# Patient Record
Sex: FEMALE
[De-identification: names, ages, dates, MRNs, and addresses within clinical notes are randomized per-mention and may not be internally consistent; named-entity substitution may affect disease eponyms.]

---

## 2016-09-21 RX ORDER — NEOMYCIN/POLYMYXIN B/DEXAMETHA 0.1 %
1 SUSPENSION, DROPS(FINAL DOSAGE FORM)(ML) OPHTHALMIC (EYE)
Qty: 0 | Refills: 0 | COMMUNITY
Start: 2016-09-21

## 2017-01-06 ENCOUNTER — APPOINTMENT (OUTPATIENT)
Dept: PHYSICAL MEDICINE AND REHAB | Facility: CLINIC | Age: 67
End: 2017-01-06

## 2017-01-17 ENCOUNTER — OUTPATIENT (OUTPATIENT)
Dept: OUTPATIENT SERVICES | Facility: HOSPITAL | Age: 67
LOS: 1 days | End: 2017-01-17
Payer: MEDICARE

## 2017-01-17 DIAGNOSIS — Z98.89 OTHER SPECIFIED POSTPROCEDURAL STATES: Chronic | ICD-10-CM

## 2017-01-17 DIAGNOSIS — L89.300 PRESSURE ULCER OF UNSPECIFIED BUTTOCK, UNSTAGEABLE: ICD-10-CM

## 2017-01-17 PROCEDURE — G0463: CPT

## 2017-01-19 ENCOUNTER — OUTPATIENT (OUTPATIENT)
Dept: OUTPATIENT SERVICES | Facility: HOSPITAL | Age: 67
LOS: 1 days | End: 2017-01-19
Payer: MEDICARE

## 2017-01-19 DIAGNOSIS — Z98.89 OTHER SPECIFIED POSTPROCEDURAL STATES: Chronic | ICD-10-CM

## 2017-01-19 DIAGNOSIS — Z87.891 PERSONAL HISTORY OF NICOTINE DEPENDENCE: ICD-10-CM

## 2017-01-19 DIAGNOSIS — L89.153 PRESSURE ULCER OF SACRAL REGION, STAGE 3: ICD-10-CM

## 2017-01-19 DIAGNOSIS — H54.42 BLINDNESS, LEFT EYE, NORMAL VISION RIGHT EYE: ICD-10-CM

## 2017-01-19 DIAGNOSIS — R32 UNSPECIFIED URINARY INCONTINENCE: ICD-10-CM

## 2017-01-19 DIAGNOSIS — L89.300 PRESSURE ULCER OF UNSPECIFIED BUTTOCK, UNSTAGEABLE: ICD-10-CM

## 2017-01-19 DIAGNOSIS — Z79.899 OTHER LONG TERM (CURRENT) DRUG THERAPY: ICD-10-CM

## 2017-01-19 DIAGNOSIS — Z98.890 OTHER SPECIFIED POSTPROCEDURAL STATES: ICD-10-CM

## 2017-01-19 PROCEDURE — G0463: CPT

## 2017-01-20 ENCOUNTER — APPOINTMENT (OUTPATIENT)
Dept: PHYSICAL MEDICINE AND REHAB | Facility: CLINIC | Age: 67
End: 2017-01-20

## 2017-01-20 VITALS
TEMPERATURE: 97.6 F | DIASTOLIC BLOOD PRESSURE: 55 MMHG | SYSTOLIC BLOOD PRESSURE: 113 MMHG | OXYGEN SATURATION: 96 % | HEART RATE: 73 BPM

## 2017-01-20 DIAGNOSIS — Q05.9 SPINA BIFIDA, UNSPECIFIED: ICD-10-CM

## 2017-01-21 DIAGNOSIS — L89.153 PRESSURE ULCER OF SACRAL REGION, STAGE 3: ICD-10-CM

## 2017-01-23 ENCOUNTER — RX RENEWAL (OUTPATIENT)
Age: 67
End: 2017-01-23

## 2017-01-23 RX ORDER — ERTAPENEM SODIUM 1 G/1
1 INJECTION, POWDER, LYOPHILIZED, FOR SOLUTION INTRAMUSCULAR; INTRAVENOUS
Qty: 606 | Refills: 0 | Status: COMPLETED | COMMUNITY
Start: 2016-08-09

## 2017-01-24 ENCOUNTER — OUTPATIENT (OUTPATIENT)
Dept: OUTPATIENT SERVICES | Facility: HOSPITAL | Age: 67
LOS: 1 days | End: 2017-01-24
Payer: MEDICARE

## 2017-01-24 DIAGNOSIS — L89.300 PRESSURE ULCER OF UNSPECIFIED BUTTOCK, UNSTAGEABLE: ICD-10-CM

## 2017-01-24 DIAGNOSIS — Z98.89 OTHER SPECIFIED POSTPROCEDURAL STATES: Chronic | ICD-10-CM

## 2017-01-24 PROCEDURE — 97602 WOUND(S) CARE NON-SELECTIVE: CPT

## 2017-01-26 ENCOUNTER — FORM ENCOUNTER (OUTPATIENT)
Age: 67
End: 2017-01-26

## 2017-01-26 DIAGNOSIS — R32 UNSPECIFIED URINARY INCONTINENCE: ICD-10-CM

## 2017-01-26 DIAGNOSIS — Z79.899 OTHER LONG TERM (CURRENT) DRUG THERAPY: ICD-10-CM

## 2017-01-26 DIAGNOSIS — H54.42 BLINDNESS, LEFT EYE, NORMAL VISION RIGHT EYE: ICD-10-CM

## 2017-01-26 DIAGNOSIS — L89.153 PRESSURE ULCER OF SACRAL REGION, STAGE 3: ICD-10-CM

## 2017-01-26 DIAGNOSIS — Z87.891 PERSONAL HISTORY OF NICOTINE DEPENDENCE: ICD-10-CM

## 2017-01-26 DIAGNOSIS — G83.9 PARALYTIC SYNDROME, UNSPECIFIED: ICD-10-CM

## 2017-01-26 DIAGNOSIS — Z98.890 OTHER SPECIFIED POSTPROCEDURAL STATES: ICD-10-CM

## 2017-01-27 ENCOUNTER — APPOINTMENT (OUTPATIENT)
Dept: MRI IMAGING | Facility: CLINIC | Age: 67
End: 2017-01-27

## 2017-01-27 ENCOUNTER — OUTPATIENT (OUTPATIENT)
Dept: OUTPATIENT SERVICES | Facility: HOSPITAL | Age: 67
LOS: 1 days | End: 2017-01-27
Payer: MEDICARE

## 2017-01-27 DIAGNOSIS — G82.22 PARAPLEGIA, INCOMPLETE: ICD-10-CM

## 2017-01-27 DIAGNOSIS — Z98.89 OTHER SPECIFIED POSTPROCEDURAL STATES: Chronic | ICD-10-CM

## 2017-01-27 DIAGNOSIS — G03.9 MENINGITIS, UNSPECIFIED: ICD-10-CM

## 2017-01-28 PROCEDURE — A9585: CPT

## 2017-01-28 PROCEDURE — 72158 MRI LUMBAR SPINE W/O & W/DYE: CPT

## 2017-01-28 PROCEDURE — 82565 ASSAY OF CREATININE: CPT

## 2017-01-28 PROCEDURE — 72157 MRI CHEST SPINE W/O & W/DYE: CPT

## 2017-01-30 ENCOUNTER — OUTPATIENT (OUTPATIENT)
Dept: OUTPATIENT SERVICES | Facility: HOSPITAL | Age: 67
LOS: 1 days | End: 2017-01-30
Payer: MEDICARE

## 2017-01-30 DIAGNOSIS — Z98.89 OTHER SPECIFIED POSTPROCEDURAL STATES: Chronic | ICD-10-CM

## 2017-01-30 DIAGNOSIS — L89.300 PRESSURE ULCER OF UNSPECIFIED BUTTOCK, UNSTAGEABLE: ICD-10-CM

## 2017-01-30 PROCEDURE — 97602 WOUND(S) CARE NON-SELECTIVE: CPT

## 2017-01-31 ENCOUNTER — RX RENEWAL (OUTPATIENT)
Age: 67
End: 2017-01-31

## 2017-01-31 DIAGNOSIS — Z79.899 OTHER LONG TERM (CURRENT) DRUG THERAPY: ICD-10-CM

## 2017-01-31 DIAGNOSIS — Z87.891 PERSONAL HISTORY OF NICOTINE DEPENDENCE: ICD-10-CM

## 2017-01-31 DIAGNOSIS — H54.42 BLINDNESS, LEFT EYE, NORMAL VISION RIGHT EYE: ICD-10-CM

## 2017-01-31 DIAGNOSIS — L89.153 PRESSURE ULCER OF SACRAL REGION, STAGE 3: ICD-10-CM

## 2017-01-31 DIAGNOSIS — G83.9 PARALYTIC SYNDROME, UNSPECIFIED: ICD-10-CM

## 2017-01-31 DIAGNOSIS — Z98.890 OTHER SPECIFIED POSTPROCEDURAL STATES: ICD-10-CM

## 2017-01-31 DIAGNOSIS — R32 UNSPECIFIED URINARY INCONTINENCE: ICD-10-CM

## 2017-02-06 ENCOUNTER — OUTPATIENT (OUTPATIENT)
Dept: OUTPATIENT SERVICES | Facility: HOSPITAL | Age: 67
LOS: 1 days | End: 2017-02-06
Payer: MEDICARE

## 2017-02-06 DIAGNOSIS — L89.300 PRESSURE ULCER OF UNSPECIFIED BUTTOCK, UNSTAGEABLE: ICD-10-CM

## 2017-02-06 DIAGNOSIS — Z98.89 OTHER SPECIFIED POSTPROCEDURAL STATES: Chronic | ICD-10-CM

## 2017-02-06 PROCEDURE — G0463: CPT

## 2017-02-07 DIAGNOSIS — H54.42 BLINDNESS, LEFT EYE, NORMAL VISION RIGHT EYE: ICD-10-CM

## 2017-02-07 DIAGNOSIS — R32 UNSPECIFIED URINARY INCONTINENCE: ICD-10-CM

## 2017-02-07 DIAGNOSIS — L89.153 PRESSURE ULCER OF SACRAL REGION, STAGE 3: ICD-10-CM

## 2017-02-07 DIAGNOSIS — G83.9 PARALYTIC SYNDROME, UNSPECIFIED: ICD-10-CM

## 2017-02-07 DIAGNOSIS — Z79.899 OTHER LONG TERM (CURRENT) DRUG THERAPY: ICD-10-CM

## 2017-02-07 DIAGNOSIS — Z87.891 PERSONAL HISTORY OF NICOTINE DEPENDENCE: ICD-10-CM

## 2017-02-07 DIAGNOSIS — Z98.890 OTHER SPECIFIED POSTPROCEDURAL STATES: ICD-10-CM

## 2017-02-13 ENCOUNTER — OUTPATIENT (OUTPATIENT)
Dept: OUTPATIENT SERVICES | Facility: HOSPITAL | Age: 67
LOS: 1 days | End: 2017-02-13
Payer: MEDICARE

## 2017-02-13 DIAGNOSIS — Z98.89 OTHER SPECIFIED POSTPROCEDURAL STATES: Chronic | ICD-10-CM

## 2017-02-13 DIAGNOSIS — L89.300 PRESSURE ULCER OF UNSPECIFIED BUTTOCK, UNSTAGEABLE: ICD-10-CM

## 2017-02-13 PROCEDURE — G0463: CPT

## 2017-02-14 DIAGNOSIS — Z87.891 PERSONAL HISTORY OF NICOTINE DEPENDENCE: ICD-10-CM

## 2017-02-14 DIAGNOSIS — R32 UNSPECIFIED URINARY INCONTINENCE: ICD-10-CM

## 2017-02-14 DIAGNOSIS — L89.152 PRESSURE ULCER OF SACRAL REGION, STAGE 2: ICD-10-CM

## 2017-02-14 DIAGNOSIS — Z98.890 OTHER SPECIFIED POSTPROCEDURAL STATES: ICD-10-CM

## 2017-02-14 DIAGNOSIS — H54.42 BLINDNESS, LEFT EYE, NORMAL VISION RIGHT EYE: ICD-10-CM

## 2017-02-14 DIAGNOSIS — Z79.899 OTHER LONG TERM (CURRENT) DRUG THERAPY: ICD-10-CM

## 2017-02-14 DIAGNOSIS — G83.9 PARALYTIC SYNDROME, UNSPECIFIED: ICD-10-CM

## 2017-02-15 ENCOUNTER — RX RENEWAL (OUTPATIENT)
Age: 67
End: 2017-02-15

## 2017-02-15 RX ORDER — GABAPENTIN 100 MG/1
100 CAPSULE ORAL 4 TIMES DAILY
Qty: 1080 | Refills: 1 | Status: COMPLETED | COMMUNITY
Start: 2017-02-13 | End: 2017-02-15

## 2017-02-27 ENCOUNTER — OUTPATIENT (OUTPATIENT)
Dept: OUTPATIENT SERVICES | Facility: HOSPITAL | Age: 67
LOS: 1 days | End: 2017-02-27
Payer: MEDICARE

## 2017-02-27 DIAGNOSIS — Z98.89 OTHER SPECIFIED POSTPROCEDURAL STATES: Chronic | ICD-10-CM

## 2017-02-27 DIAGNOSIS — L89.300 PRESSURE ULCER OF UNSPECIFIED BUTTOCK, UNSTAGEABLE: ICD-10-CM

## 2017-02-27 PROCEDURE — G0463: CPT

## 2017-02-28 DIAGNOSIS — H54.42 BLINDNESS, LEFT EYE, NORMAL VISION RIGHT EYE: ICD-10-CM

## 2017-02-28 DIAGNOSIS — Z98.890 OTHER SPECIFIED POSTPROCEDURAL STATES: ICD-10-CM

## 2017-02-28 DIAGNOSIS — G83.9 PARALYTIC SYNDROME, UNSPECIFIED: ICD-10-CM

## 2017-02-28 DIAGNOSIS — Z87.891 PERSONAL HISTORY OF NICOTINE DEPENDENCE: ICD-10-CM

## 2017-02-28 DIAGNOSIS — R32 UNSPECIFIED URINARY INCONTINENCE: ICD-10-CM

## 2017-02-28 DIAGNOSIS — L89.152 PRESSURE ULCER OF SACRAL REGION, STAGE 2: ICD-10-CM

## 2017-02-28 DIAGNOSIS — Z79.899 OTHER LONG TERM (CURRENT) DRUG THERAPY: ICD-10-CM

## 2017-03-06 ENCOUNTER — RX RENEWAL (OUTPATIENT)
Age: 67
End: 2017-03-06

## 2017-03-13 ENCOUNTER — OUTPATIENT (OUTPATIENT)
Dept: OUTPATIENT SERVICES | Facility: HOSPITAL | Age: 67
LOS: 1 days | End: 2017-03-13
Payer: MEDICARE

## 2017-03-13 DIAGNOSIS — L89.300 PRESSURE ULCER OF UNSPECIFIED BUTTOCK, UNSTAGEABLE: ICD-10-CM

## 2017-03-13 DIAGNOSIS — Z98.89 OTHER SPECIFIED POSTPROCEDURAL STATES: Chronic | ICD-10-CM

## 2017-03-13 PROCEDURE — G0463: CPT

## 2017-03-16 DIAGNOSIS — R32 UNSPECIFIED URINARY INCONTINENCE: ICD-10-CM

## 2017-03-16 DIAGNOSIS — Z79.899 OTHER LONG TERM (CURRENT) DRUG THERAPY: ICD-10-CM

## 2017-03-16 DIAGNOSIS — Z87.891 PERSONAL HISTORY OF NICOTINE DEPENDENCE: ICD-10-CM

## 2017-03-16 DIAGNOSIS — L89.152 PRESSURE ULCER OF SACRAL REGION, STAGE 2: ICD-10-CM

## 2017-03-16 DIAGNOSIS — Z98.890 OTHER SPECIFIED POSTPROCEDURAL STATES: ICD-10-CM

## 2017-03-16 DIAGNOSIS — H54.42 BLINDNESS, LEFT EYE, NORMAL VISION RIGHT EYE: ICD-10-CM

## 2017-03-16 DIAGNOSIS — G83.9 PARALYTIC SYNDROME, UNSPECIFIED: ICD-10-CM

## 2017-03-27 ENCOUNTER — OUTPATIENT (OUTPATIENT)
Dept: OUTPATIENT SERVICES | Facility: HOSPITAL | Age: 67
LOS: 1 days | End: 2017-03-27
Payer: MEDICARE

## 2017-03-27 DIAGNOSIS — L89.300 PRESSURE ULCER OF UNSPECIFIED BUTTOCK, UNSTAGEABLE: ICD-10-CM

## 2017-03-27 DIAGNOSIS — Z98.89 OTHER SPECIFIED POSTPROCEDURAL STATES: Chronic | ICD-10-CM

## 2017-03-27 PROCEDURE — G0463: CPT

## 2017-03-28 DIAGNOSIS — Z98.890 OTHER SPECIFIED POSTPROCEDURAL STATES: ICD-10-CM

## 2017-03-28 DIAGNOSIS — L89.152 PRESSURE ULCER OF SACRAL REGION, STAGE 2: ICD-10-CM

## 2017-03-28 DIAGNOSIS — Z87.891 PERSONAL HISTORY OF NICOTINE DEPENDENCE: ICD-10-CM

## 2017-03-28 DIAGNOSIS — H54.42 BLINDNESS, LEFT EYE, NORMAL VISION RIGHT EYE: ICD-10-CM

## 2017-03-28 DIAGNOSIS — R32 UNSPECIFIED URINARY INCONTINENCE: ICD-10-CM

## 2017-03-28 DIAGNOSIS — Z79.899 OTHER LONG TERM (CURRENT) DRUG THERAPY: ICD-10-CM

## 2017-03-28 DIAGNOSIS — G83.9 PARALYTIC SYNDROME, UNSPECIFIED: ICD-10-CM

## 2017-03-29 ENCOUNTER — APPOINTMENT (OUTPATIENT)
Dept: OPHTHALMOLOGY | Facility: CLINIC | Age: 67
End: 2017-03-29

## 2017-05-19 ENCOUNTER — OUTPATIENT (OUTPATIENT)
Dept: OUTPATIENT SERVICES | Facility: HOSPITAL | Age: 67
LOS: 1 days | End: 2017-05-19
Payer: MEDICARE

## 2017-05-19 DIAGNOSIS — Z98.89 OTHER SPECIFIED POSTPROCEDURAL STATES: Chronic | ICD-10-CM

## 2017-05-19 DIAGNOSIS — L89.152 PRESSURE ULCER OF SACRAL REGION, STAGE 2: ICD-10-CM

## 2017-05-19 PROCEDURE — 97602 WOUND(S) CARE NON-SELECTIVE: CPT

## 2017-05-20 DIAGNOSIS — H54.42 BLINDNESS, LEFT EYE, NORMAL VISION RIGHT EYE: ICD-10-CM

## 2017-05-20 DIAGNOSIS — Z87.891 PERSONAL HISTORY OF NICOTINE DEPENDENCE: ICD-10-CM

## 2017-05-20 DIAGNOSIS — L89.152 PRESSURE ULCER OF SACRAL REGION, STAGE 2: ICD-10-CM

## 2017-05-20 DIAGNOSIS — Z98.890 OTHER SPECIFIED POSTPROCEDURAL STATES: ICD-10-CM

## 2017-05-20 DIAGNOSIS — G83.9 PARALYTIC SYNDROME, UNSPECIFIED: ICD-10-CM

## 2017-05-20 DIAGNOSIS — R32 UNSPECIFIED URINARY INCONTINENCE: ICD-10-CM

## 2017-05-20 DIAGNOSIS — Z79.899 OTHER LONG TERM (CURRENT) DRUG THERAPY: ICD-10-CM

## 2017-05-30 ENCOUNTER — OUTPATIENT (OUTPATIENT)
Dept: OUTPATIENT SERVICES | Facility: HOSPITAL | Age: 67
LOS: 1 days | End: 2017-05-30
Payer: MEDICARE

## 2017-05-30 ENCOUNTER — APPOINTMENT (OUTPATIENT)
Dept: CT IMAGING | Facility: CLINIC | Age: 67
End: 2017-05-30

## 2017-05-30 DIAGNOSIS — L89.152 PRESSURE ULCER OF SACRAL REGION, STAGE 2: ICD-10-CM

## 2017-05-30 DIAGNOSIS — Z98.89 OTHER SPECIFIED POSTPROCEDURAL STATES: Chronic | ICD-10-CM

## 2017-05-30 PROCEDURE — 17250 CHEM CAUT OF GRANLTJ TISSUE: CPT

## 2017-05-31 DIAGNOSIS — R32 UNSPECIFIED URINARY INCONTINENCE: ICD-10-CM

## 2017-05-31 DIAGNOSIS — L89.152 PRESSURE ULCER OF SACRAL REGION, STAGE 2: ICD-10-CM

## 2017-05-31 DIAGNOSIS — Z98.890 OTHER SPECIFIED POSTPROCEDURAL STATES: ICD-10-CM

## 2017-05-31 DIAGNOSIS — Z87.891 PERSONAL HISTORY OF NICOTINE DEPENDENCE: ICD-10-CM

## 2017-05-31 DIAGNOSIS — L92.9 GRANULOMATOUS DISORDER OF THE SKIN AND SUBCUTANEOUS TISSUE, UNSPECIFIED: ICD-10-CM

## 2017-05-31 DIAGNOSIS — Z79.899 OTHER LONG TERM (CURRENT) DRUG THERAPY: ICD-10-CM

## 2017-05-31 DIAGNOSIS — G83.9 PARALYTIC SYNDROME, UNSPECIFIED: ICD-10-CM

## 2017-05-31 DIAGNOSIS — H54.42 BLINDNESS, LEFT EYE, NORMAL VISION RIGHT EYE: ICD-10-CM

## 2017-06-05 ENCOUNTER — OUTPATIENT (OUTPATIENT)
Dept: OUTPATIENT SERVICES | Facility: HOSPITAL | Age: 67
LOS: 1 days | End: 2017-06-05
Payer: MEDICARE

## 2017-06-05 DIAGNOSIS — Z98.89 OTHER SPECIFIED POSTPROCEDURAL STATES: Chronic | ICD-10-CM

## 2017-06-05 DIAGNOSIS — L89.152 PRESSURE ULCER OF SACRAL REGION, STAGE 2: ICD-10-CM

## 2017-06-05 PROCEDURE — G0463: CPT

## 2017-06-06 DIAGNOSIS — Z98.890 OTHER SPECIFIED POSTPROCEDURAL STATES: ICD-10-CM

## 2017-06-06 DIAGNOSIS — Z79.899 OTHER LONG TERM (CURRENT) DRUG THERAPY: ICD-10-CM

## 2017-06-06 DIAGNOSIS — R63.6 UNDERWEIGHT: ICD-10-CM

## 2017-06-06 DIAGNOSIS — R32 UNSPECIFIED URINARY INCONTINENCE: ICD-10-CM

## 2017-06-06 DIAGNOSIS — G83.9 PARALYTIC SYNDROME, UNSPECIFIED: ICD-10-CM

## 2017-06-06 DIAGNOSIS — L89.153 PRESSURE ULCER OF SACRAL REGION, STAGE 3: ICD-10-CM

## 2017-06-06 DIAGNOSIS — H54.42 BLINDNESS, LEFT EYE, NORMAL VISION RIGHT EYE: ICD-10-CM

## 2017-06-06 DIAGNOSIS — Z87.891 PERSONAL HISTORY OF NICOTINE DEPENDENCE: ICD-10-CM

## 2017-06-07 ENCOUNTER — APPOINTMENT (OUTPATIENT)
Dept: OPHTHALMOLOGY | Facility: CLINIC | Age: 67
End: 2017-06-07

## 2017-06-12 ENCOUNTER — OUTPATIENT (OUTPATIENT)
Dept: OUTPATIENT SERVICES | Facility: HOSPITAL | Age: 67
LOS: 1 days | End: 2017-06-12
Payer: MEDICARE

## 2017-06-12 DIAGNOSIS — Z98.89 OTHER SPECIFIED POSTPROCEDURAL STATES: Chronic | ICD-10-CM

## 2017-06-12 DIAGNOSIS — L89.153 PRESSURE ULCER OF SACRAL REGION, STAGE 3: ICD-10-CM

## 2017-06-12 PROCEDURE — G0463: CPT

## 2017-06-14 ENCOUNTER — APPOINTMENT (OUTPATIENT)
Dept: PHYSICAL MEDICINE AND REHAB | Facility: CLINIC | Age: 67
End: 2017-06-14

## 2017-06-14 VITALS
TEMPERATURE: 98.4 F | HEIGHT: 67 IN | BODY MASS INDEX: 18.05 KG/M2 | DIASTOLIC BLOOD PRESSURE: 72 MMHG | WEIGHT: 115 LBS | OXYGEN SATURATION: 96 % | SYSTOLIC BLOOD PRESSURE: 158 MMHG | HEART RATE: 83 BPM

## 2017-06-14 VITALS — WEIGHT: 113 LBS | BODY MASS INDEX: 17.7 KG/M2

## 2017-06-14 DIAGNOSIS — L89.153 PRESSURE ULCER OF SACRAL REGION, STAGE 3: ICD-10-CM

## 2017-06-14 DIAGNOSIS — Z87.891 PERSONAL HISTORY OF NICOTINE DEPENDENCE: ICD-10-CM

## 2017-06-14 DIAGNOSIS — Z98.890 OTHER SPECIFIED POSTPROCEDURAL STATES: ICD-10-CM

## 2017-06-14 DIAGNOSIS — R63.6 UNDERWEIGHT: ICD-10-CM

## 2017-06-14 DIAGNOSIS — G83.9 PARALYTIC SYNDROME, UNSPECIFIED: ICD-10-CM

## 2017-06-14 DIAGNOSIS — H54.42 BLINDNESS, LEFT EYE, NORMAL VISION RIGHT EYE: ICD-10-CM

## 2017-06-14 DIAGNOSIS — R32 UNSPECIFIED URINARY INCONTINENCE: ICD-10-CM

## 2017-06-14 DIAGNOSIS — Z79.899 OTHER LONG TERM (CURRENT) DRUG THERAPY: ICD-10-CM

## 2017-06-14 DIAGNOSIS — I71.00 DISSECTION OF UNSPECIFIED SITE OF AORTA: ICD-10-CM

## 2017-06-14 RX ORDER — FOLIC ACID 1 MG/1
1 TABLET ORAL
Qty: 30 | Refills: 0 | Status: ACTIVE | COMMUNITY
Start: 2016-12-27

## 2017-06-15 RX ORDER — HYDROCORTISONE 2.5% 25 MG/G
2.5 CREAM TOPICAL
Qty: 30 | Refills: 0 | Status: ACTIVE | COMMUNITY
Start: 2017-05-12

## 2017-06-20 ENCOUNTER — RX RENEWAL (OUTPATIENT)
Age: 67
End: 2017-06-20

## 2017-06-20 ENCOUNTER — OUTPATIENT (OUTPATIENT)
Dept: OUTPATIENT SERVICES | Facility: HOSPITAL | Age: 67
LOS: 1 days | End: 2017-06-20
Payer: MEDICARE

## 2017-06-20 DIAGNOSIS — Z98.89 OTHER SPECIFIED POSTPROCEDURAL STATES: Chronic | ICD-10-CM

## 2017-06-20 DIAGNOSIS — L89.152 PRESSURE ULCER OF SACRAL REGION, STAGE 2: ICD-10-CM

## 2017-06-20 PROCEDURE — G0463: CPT

## 2017-06-22 DIAGNOSIS — Z79.899 OTHER LONG TERM (CURRENT) DRUG THERAPY: ICD-10-CM

## 2017-06-22 DIAGNOSIS — L89.153 PRESSURE ULCER OF SACRAL REGION, STAGE 3: ICD-10-CM

## 2017-06-22 DIAGNOSIS — R63.6 UNDERWEIGHT: ICD-10-CM

## 2017-06-22 DIAGNOSIS — G83.9 PARALYTIC SYNDROME, UNSPECIFIED: ICD-10-CM

## 2017-06-22 DIAGNOSIS — Z98.890 OTHER SPECIFIED POSTPROCEDURAL STATES: ICD-10-CM

## 2017-06-22 DIAGNOSIS — H54.42 BLINDNESS, LEFT EYE, NORMAL VISION RIGHT EYE: ICD-10-CM

## 2017-06-22 DIAGNOSIS — R32 UNSPECIFIED URINARY INCONTINENCE: ICD-10-CM

## 2017-06-22 DIAGNOSIS — Z87.891 PERSONAL HISTORY OF NICOTINE DEPENDENCE: ICD-10-CM

## 2017-06-26 ENCOUNTER — OUTPATIENT (OUTPATIENT)
Dept: OUTPATIENT SERVICES | Facility: HOSPITAL | Age: 67
LOS: 1 days | End: 2017-06-26
Payer: MEDICARE

## 2017-06-26 DIAGNOSIS — Z98.89 OTHER SPECIFIED POSTPROCEDURAL STATES: Chronic | ICD-10-CM

## 2017-06-26 DIAGNOSIS — L89.153 PRESSURE ULCER OF SACRAL REGION, STAGE 3: ICD-10-CM

## 2017-06-26 PROCEDURE — G0463: CPT

## 2017-06-27 DIAGNOSIS — H54.42 BLINDNESS, LEFT EYE, NORMAL VISION RIGHT EYE: ICD-10-CM

## 2017-06-27 DIAGNOSIS — Z79.899 OTHER LONG TERM (CURRENT) DRUG THERAPY: ICD-10-CM

## 2017-06-27 DIAGNOSIS — Z87.891 PERSONAL HISTORY OF NICOTINE DEPENDENCE: ICD-10-CM

## 2017-06-27 DIAGNOSIS — Z98.890 OTHER SPECIFIED POSTPROCEDURAL STATES: ICD-10-CM

## 2017-06-27 DIAGNOSIS — R63.6 UNDERWEIGHT: ICD-10-CM

## 2017-06-27 DIAGNOSIS — G83.9 PARALYTIC SYNDROME, UNSPECIFIED: ICD-10-CM

## 2017-06-27 DIAGNOSIS — R32 UNSPECIFIED URINARY INCONTINENCE: ICD-10-CM

## 2017-06-27 DIAGNOSIS — L89.153 PRESSURE ULCER OF SACRAL REGION, STAGE 3: ICD-10-CM

## 2017-07-05 ENCOUNTER — APPOINTMENT (OUTPATIENT)
Dept: CARDIOTHORACIC SURGERY | Facility: CLINIC | Age: 67
End: 2017-07-05

## 2017-07-05 RX ORDER — TIZANIDINE HYDROCHLORIDE 4 MG/1
4 CAPSULE ORAL AT BEDTIME
Qty: 30 | Refills: 0 | Status: DISCONTINUED | COMMUNITY
Start: 2017-01-06 | End: 2017-07-05

## 2017-07-10 ENCOUNTER — OUTPATIENT (OUTPATIENT)
Dept: OUTPATIENT SERVICES | Facility: HOSPITAL | Age: 67
LOS: 1 days | End: 2017-07-10
Payer: MEDICARE

## 2017-07-10 DIAGNOSIS — Z98.89 OTHER SPECIFIED POSTPROCEDURAL STATES: Chronic | ICD-10-CM

## 2017-07-10 DIAGNOSIS — L89.152 PRESSURE ULCER OF SACRAL REGION, STAGE 2: ICD-10-CM

## 2017-07-10 PROCEDURE — G0463: CPT

## 2017-07-12 DIAGNOSIS — G83.9 PARALYTIC SYNDROME, UNSPECIFIED: ICD-10-CM

## 2017-07-12 DIAGNOSIS — R32 UNSPECIFIED URINARY INCONTINENCE: ICD-10-CM

## 2017-07-12 DIAGNOSIS — Z98.890 OTHER SPECIFIED POSTPROCEDURAL STATES: ICD-10-CM

## 2017-07-12 DIAGNOSIS — Z79.899 OTHER LONG TERM (CURRENT) DRUG THERAPY: ICD-10-CM

## 2017-07-12 DIAGNOSIS — Z87.891 PERSONAL HISTORY OF NICOTINE DEPENDENCE: ICD-10-CM

## 2017-07-12 DIAGNOSIS — L89.153 PRESSURE ULCER OF SACRAL REGION, STAGE 3: ICD-10-CM

## 2017-07-12 DIAGNOSIS — H54.42 BLINDNESS, LEFT EYE, NORMAL VISION RIGHT EYE: ICD-10-CM

## 2017-07-12 DIAGNOSIS — R63.6 UNDERWEIGHT: ICD-10-CM

## 2017-07-25 ENCOUNTER — OUTPATIENT (OUTPATIENT)
Dept: OUTPATIENT SERVICES | Facility: HOSPITAL | Age: 67
LOS: 1 days | End: 2017-07-25
Payer: MEDICARE

## 2017-07-25 DIAGNOSIS — L89.153 PRESSURE ULCER OF SACRAL REGION, STAGE 3: ICD-10-CM

## 2017-07-25 DIAGNOSIS — Z98.89 OTHER SPECIFIED POSTPROCEDURAL STATES: Chronic | ICD-10-CM

## 2017-07-25 PROCEDURE — G0463: CPT

## 2017-07-26 DIAGNOSIS — Z87.891 PERSONAL HISTORY OF NICOTINE DEPENDENCE: ICD-10-CM

## 2017-07-26 DIAGNOSIS — L89.153 PRESSURE ULCER OF SACRAL REGION, STAGE 3: ICD-10-CM

## 2017-07-26 DIAGNOSIS — Z98.890 OTHER SPECIFIED POSTPROCEDURAL STATES: ICD-10-CM

## 2017-07-26 DIAGNOSIS — G83.9 PARALYTIC SYNDROME, UNSPECIFIED: ICD-10-CM

## 2017-07-26 DIAGNOSIS — Z79.899 OTHER LONG TERM (CURRENT) DRUG THERAPY: ICD-10-CM

## 2017-07-26 DIAGNOSIS — H54.42 BLINDNESS, LEFT EYE, NORMAL VISION RIGHT EYE: ICD-10-CM

## 2017-07-26 DIAGNOSIS — R63.6 UNDERWEIGHT: ICD-10-CM

## 2017-07-26 DIAGNOSIS — R32 UNSPECIFIED URINARY INCONTINENCE: ICD-10-CM

## 2017-08-10 ENCOUNTER — RX RENEWAL (OUTPATIENT)
Age: 67
End: 2017-08-10

## 2017-08-10 RX ORDER — ONABOTULINUMTOXINA 200 [USP'U]/1
200 INJECTION, POWDER, LYOPHILIZED, FOR SOLUTION INTRADERMAL; INTRAMUSCULAR
Qty: 1 | Refills: 0 | Status: ACTIVE | OUTPATIENT
Start: 2017-08-10

## 2017-08-11 ENCOUNTER — APPOINTMENT (OUTPATIENT)
Dept: PHYSICAL MEDICINE AND REHAB | Facility: CLINIC | Age: 67
End: 2017-08-11
Payer: MEDICARE

## 2017-08-11 PROCEDURE — 64642 CHEMODENERV 1 EXTREMITY 1-4: CPT | Mod: RT

## 2017-08-11 PROCEDURE — 99213 OFFICE O/P EST LOW 20 MIN: CPT | Mod: 25

## 2017-08-11 PROCEDURE — 95874 GUIDE NERV DESTR NEEDLE EMG: CPT

## 2017-08-14 ENCOUNTER — TRANSCRIPTION ENCOUNTER (OUTPATIENT)
Age: 67
End: 2017-08-14

## 2017-08-14 ENCOUNTER — OUTPATIENT (OUTPATIENT)
Dept: OUTPATIENT SERVICES | Facility: HOSPITAL | Age: 67
LOS: 1 days | End: 2017-08-14
Payer: MEDICARE

## 2017-08-14 DIAGNOSIS — Z98.89 OTHER SPECIFIED POSTPROCEDURAL STATES: Chronic | ICD-10-CM

## 2017-08-14 DIAGNOSIS — L89.153 PRESSURE ULCER OF SACRAL REGION, STAGE 3: ICD-10-CM

## 2017-08-14 PROCEDURE — G0463: CPT

## 2017-08-15 DIAGNOSIS — R63.6 UNDERWEIGHT: ICD-10-CM

## 2017-08-15 DIAGNOSIS — Z98.890 OTHER SPECIFIED POSTPROCEDURAL STATES: ICD-10-CM

## 2017-08-15 DIAGNOSIS — Z87.891 PERSONAL HISTORY OF NICOTINE DEPENDENCE: ICD-10-CM

## 2017-08-15 DIAGNOSIS — Z79.899 OTHER LONG TERM (CURRENT) DRUG THERAPY: ICD-10-CM

## 2017-08-15 DIAGNOSIS — Z88.8 ALLERGY STATUS TO OTHER DRUGS, MEDICAMENTS AND BIOLOGICAL SUBSTANCES STATUS: ICD-10-CM

## 2017-08-15 DIAGNOSIS — H54.42 BLINDNESS, LEFT EYE, NORMAL VISION RIGHT EYE: ICD-10-CM

## 2017-08-15 DIAGNOSIS — G83.9 PARALYTIC SYNDROME, UNSPECIFIED: ICD-10-CM

## 2017-08-15 DIAGNOSIS — R32 UNSPECIFIED URINARY INCONTINENCE: ICD-10-CM

## 2017-08-15 DIAGNOSIS — L89.153 PRESSURE ULCER OF SACRAL REGION, STAGE 3: ICD-10-CM

## 2017-08-23 ENCOUNTER — APPOINTMENT (OUTPATIENT)
Dept: GERIATRICS | Facility: CLINIC | Age: 67
End: 2017-08-23
Payer: MEDICARE

## 2017-08-23 VITALS
HEART RATE: 77 BPM | BODY MASS INDEX: 16.64 KG/M2 | RESPIRATION RATE: 15 BRPM | HEIGHT: 67 IN | TEMPERATURE: 98.5 F | SYSTOLIC BLOOD PRESSURE: 120 MMHG | DIASTOLIC BLOOD PRESSURE: 60 MMHG | WEIGHT: 106 LBS | OXYGEN SATURATION: 96 %

## 2017-08-23 DIAGNOSIS — Z51.5 ENCOUNTER FOR PALLIATIVE CARE: ICD-10-CM

## 2017-08-23 PROCEDURE — 99354: CPT

## 2017-08-23 PROCEDURE — 99205 OFFICE O/P NEW HI 60 MIN: CPT | Mod: 25

## 2017-08-23 RX ORDER — GABAPENTIN 300 MG/1
300 CAPSULE ORAL AT BEDTIME
Refills: 0 | Status: DISCONTINUED | COMMUNITY
Start: 2017-06-14 | End: 2017-08-23

## 2017-08-23 RX ORDER — BACLOFEN 10 MG/1
10 TABLET ORAL TWICE DAILY
Qty: 60 | Refills: 1 | Status: DISCONTINUED | COMMUNITY
Start: 2017-01-06 | End: 2017-08-23

## 2017-08-23 RX ORDER — VALACYCLOVIR 1 G/1
1 TABLET, FILM COATED ORAL DAILY
Qty: 30 | Refills: 5 | Status: DISCONTINUED | COMMUNITY
Start: 2017-06-20 | End: 2017-08-23

## 2017-08-31 PROBLEM — Z51.5 ENCOUNTER FOR PALLIATIVE CARE: Status: RESOLVED | Noted: 2017-08-31 | Resolved: 2017-08-31

## 2017-09-08 ENCOUNTER — APPOINTMENT (OUTPATIENT)
Dept: PHYSICAL MEDICINE AND REHAB | Facility: CLINIC | Age: 67
End: 2017-09-08
Payer: MEDICARE

## 2017-09-08 VITALS
SYSTOLIC BLOOD PRESSURE: 101 MMHG | HEART RATE: 69 BPM | OXYGEN SATURATION: 96 % | DIASTOLIC BLOOD PRESSURE: 53 MMHG | TEMPERATURE: 97.9 F

## 2017-09-08 PROCEDURE — 99213 OFFICE O/P EST LOW 20 MIN: CPT

## 2017-09-08 RX ORDER — ONABOTULINUMTOXINA 200 [USP'U]/1
200 INJECTION, POWDER, LYOPHILIZED, FOR SOLUTION INTRADERMAL; INTRAMUSCULAR
Qty: 3 | Refills: 0 | Status: ACTIVE | OUTPATIENT
Start: 2017-09-08

## 2017-09-11 ENCOUNTER — TRANSCRIPTION ENCOUNTER (OUTPATIENT)
Age: 67
End: 2017-09-11

## 2017-09-11 RX ORDER — LIDOCAINE HYDROCHLORIDE 20 MG/ML
2 JELLY TOPICAL
Qty: 1 | Refills: 0 | Status: ACTIVE | COMMUNITY
Start: 2017-09-11 | End: 1900-01-01

## 2017-09-14 ENCOUNTER — OUTPATIENT (OUTPATIENT)
Dept: OUTPATIENT SERVICES | Facility: HOSPITAL | Age: 67
LOS: 1 days | End: 2017-09-14
Payer: MEDICARE

## 2017-09-14 DIAGNOSIS — Z98.89 OTHER SPECIFIED POSTPROCEDURAL STATES: Chronic | ICD-10-CM

## 2017-09-14 DIAGNOSIS — L89.153 PRESSURE ULCER OF SACRAL REGION, STAGE 3: ICD-10-CM

## 2017-09-14 PROCEDURE — G0463: CPT

## 2017-09-16 DIAGNOSIS — H54.42 BLINDNESS, LEFT EYE, NORMAL VISION RIGHT EYE: ICD-10-CM

## 2017-09-16 DIAGNOSIS — R63.6 UNDERWEIGHT: ICD-10-CM

## 2017-09-16 DIAGNOSIS — Z87.891 PERSONAL HISTORY OF NICOTINE DEPENDENCE: ICD-10-CM

## 2017-09-16 DIAGNOSIS — L89.153 PRESSURE ULCER OF SACRAL REGION, STAGE 3: ICD-10-CM

## 2017-09-16 DIAGNOSIS — Z88.8 ALLERGY STATUS TO OTHER DRUGS, MEDICAMENTS AND BIOLOGICAL SUBSTANCES: ICD-10-CM

## 2017-09-16 DIAGNOSIS — Z79.899 OTHER LONG TERM (CURRENT) DRUG THERAPY: ICD-10-CM

## 2017-09-16 DIAGNOSIS — G83.9 PARALYTIC SYNDROME, UNSPECIFIED: ICD-10-CM

## 2017-09-16 DIAGNOSIS — Z98.890 OTHER SPECIFIED POSTPROCEDURAL STATES: ICD-10-CM

## 2017-09-16 DIAGNOSIS — R32 UNSPECIFIED URINARY INCONTINENCE: ICD-10-CM

## 2017-09-20 ENCOUNTER — OUTPATIENT (OUTPATIENT)
Dept: OUTPATIENT SERVICES | Facility: HOSPITAL | Age: 67
LOS: 1 days | End: 2017-09-20
Payer: MEDICARE

## 2017-09-20 DIAGNOSIS — Z98.89 OTHER SPECIFIED POSTPROCEDURAL STATES: Chronic | ICD-10-CM

## 2017-09-20 DIAGNOSIS — L89.153 PRESSURE ULCER OF SACRAL REGION, STAGE 3: ICD-10-CM

## 2017-09-20 PROCEDURE — 17250 CHEM CAUT OF GRANLTJ TISSUE: CPT

## 2017-09-26 DIAGNOSIS — R63.6 UNDERWEIGHT: ICD-10-CM

## 2017-09-26 DIAGNOSIS — Z88.8 ALLERGY STATUS TO OTHER DRUGS, MEDICAMENTS AND BIOLOGICAL SUBSTANCES: ICD-10-CM

## 2017-09-26 DIAGNOSIS — L92.9 GRANULOMATOUS DISORDER OF THE SKIN AND SUBCUTANEOUS TISSUE, UNSPECIFIED: ICD-10-CM

## 2017-09-26 DIAGNOSIS — G83.9 PARALYTIC SYNDROME, UNSPECIFIED: ICD-10-CM

## 2017-09-26 DIAGNOSIS — Z87.891 PERSONAL HISTORY OF NICOTINE DEPENDENCE: ICD-10-CM

## 2017-09-26 DIAGNOSIS — H54.42 BLINDNESS, LEFT EYE, NORMAL VISION RIGHT EYE: ICD-10-CM

## 2017-09-26 DIAGNOSIS — Z98.890 OTHER SPECIFIED POSTPROCEDURAL STATES: ICD-10-CM

## 2017-09-26 DIAGNOSIS — L89.153 PRESSURE ULCER OF SACRAL REGION, STAGE 3: ICD-10-CM

## 2017-09-26 DIAGNOSIS — Z79.899 OTHER LONG TERM (CURRENT) DRUG THERAPY: ICD-10-CM

## 2017-09-26 DIAGNOSIS — R32 UNSPECIFIED URINARY INCONTINENCE: ICD-10-CM

## 2017-10-04 ENCOUNTER — OUTPATIENT (OUTPATIENT)
Dept: OUTPATIENT SERVICES | Facility: HOSPITAL | Age: 67
LOS: 1 days | End: 2017-10-04
Payer: MEDICARE

## 2017-10-04 DIAGNOSIS — Z98.89 OTHER SPECIFIED POSTPROCEDURAL STATES: Chronic | ICD-10-CM

## 2017-10-04 DIAGNOSIS — L92.9 GRANULOMATOUS DISORDER OF THE SKIN AND SUBCUTANEOUS TISSUE, UNSPECIFIED: ICD-10-CM

## 2017-10-04 PROCEDURE — G0463: CPT

## 2017-10-05 DIAGNOSIS — R63.6 UNDERWEIGHT: ICD-10-CM

## 2017-10-05 DIAGNOSIS — L89.153 PRESSURE ULCER OF SACRAL REGION, STAGE 3: ICD-10-CM

## 2017-10-05 DIAGNOSIS — Z98.890 OTHER SPECIFIED POSTPROCEDURAL STATES: ICD-10-CM

## 2017-10-05 DIAGNOSIS — N39.0 URINARY TRACT INFECTION, SITE NOT SPECIFIED: ICD-10-CM

## 2017-10-05 DIAGNOSIS — Z88.8 ALLERGY STATUS TO OTHER DRUGS, MEDICAMENTS AND BIOLOGICAL SUBSTANCES STATUS: ICD-10-CM

## 2017-10-05 DIAGNOSIS — Z87.891 PERSONAL HISTORY OF NICOTINE DEPENDENCE: ICD-10-CM

## 2017-10-05 DIAGNOSIS — Z79.899 OTHER LONG TERM (CURRENT) DRUG THERAPY: ICD-10-CM

## 2017-10-05 DIAGNOSIS — H54.7 UNSPECIFIED VISUAL LOSS: ICD-10-CM

## 2017-10-05 DIAGNOSIS — R32 UNSPECIFIED URINARY INCONTINENCE: ICD-10-CM

## 2017-10-05 DIAGNOSIS — G83.9 PARALYTIC SYNDROME, UNSPECIFIED: ICD-10-CM

## 2017-10-06 ENCOUNTER — APPOINTMENT (OUTPATIENT)
Dept: PHYSICAL MEDICINE AND REHAB | Facility: CLINIC | Age: 67
End: 2017-10-06
Payer: MEDICARE

## 2017-10-06 ENCOUNTER — APPOINTMENT (OUTPATIENT)
Dept: OPHTHALMOLOGY | Facility: CLINIC | Age: 67
End: 2017-10-06
Payer: MEDICARE

## 2017-10-06 PROCEDURE — 76512 OPH US DX B-SCAN: CPT

## 2017-10-06 PROCEDURE — 92012 INTRM OPH EXAM EST PATIENT: CPT

## 2017-10-09 ENCOUNTER — APPOINTMENT (OUTPATIENT)
Dept: MRI IMAGING | Facility: CLINIC | Age: 67
End: 2017-10-09

## 2017-10-10 ENCOUNTER — OUTPATIENT (OUTPATIENT)
Dept: OUTPATIENT SERVICES | Facility: HOSPITAL | Age: 67
LOS: 1 days | End: 2017-10-10
Payer: MEDICARE

## 2017-10-10 DIAGNOSIS — Z98.89 OTHER SPECIFIED POSTPROCEDURAL STATES: Chronic | ICD-10-CM

## 2017-10-10 DIAGNOSIS — L92.9 GRANULOMATOUS DISORDER OF THE SKIN AND SUBCUTANEOUS TISSUE, UNSPECIFIED: ICD-10-CM

## 2017-10-11 DIAGNOSIS — H54.7 UNSPECIFIED VISUAL LOSS: ICD-10-CM

## 2017-10-11 DIAGNOSIS — N39.0 URINARY TRACT INFECTION, SITE NOT SPECIFIED: ICD-10-CM

## 2017-10-11 DIAGNOSIS — L89.153 PRESSURE ULCER OF SACRAL REGION, STAGE 3: ICD-10-CM

## 2017-10-11 DIAGNOSIS — Z79.899 OTHER LONG TERM (CURRENT) DRUG THERAPY: ICD-10-CM

## 2017-10-11 DIAGNOSIS — R32 UNSPECIFIED URINARY INCONTINENCE: ICD-10-CM

## 2017-10-11 DIAGNOSIS — Z88.8 ALLERGY STATUS TO OTHER DRUGS, MEDICAMENTS AND BIOLOGICAL SUBSTANCES STATUS: ICD-10-CM

## 2017-10-11 DIAGNOSIS — R63.6 UNDERWEIGHT: ICD-10-CM

## 2017-10-11 DIAGNOSIS — Z87.891 PERSONAL HISTORY OF NICOTINE DEPENDENCE: ICD-10-CM

## 2017-10-11 DIAGNOSIS — Z98.890 OTHER SPECIFIED POSTPROCEDURAL STATES: ICD-10-CM

## 2017-10-11 DIAGNOSIS — G83.9 PARALYTIC SYNDROME, UNSPECIFIED: ICD-10-CM

## 2017-10-12 ENCOUNTER — APPOINTMENT (OUTPATIENT)
Dept: MRI IMAGING | Facility: CLINIC | Age: 67
End: 2017-10-12

## 2017-10-20 ENCOUNTER — APPOINTMENT (OUTPATIENT)
Dept: PHYSICAL MEDICINE AND REHAB | Facility: CLINIC | Age: 67
End: 2017-10-20
Payer: MEDICARE

## 2017-10-20 VITALS
TEMPERATURE: 98.7 F | OXYGEN SATURATION: 96 % | HEART RATE: 87 BPM | SYSTOLIC BLOOD PRESSURE: 158 MMHG | DIASTOLIC BLOOD PRESSURE: 70 MMHG

## 2017-10-20 VITALS — WEIGHT: 115 LBS | BODY MASS INDEX: 18.01 KG/M2

## 2017-10-20 DIAGNOSIS — S06.4X9A EPIDURAL HEMORRHAGE WITH LOSS OF CONSCIOUSNESS OF UNSPECIFIED DURATION, INITIAL ENCOUNTER: ICD-10-CM

## 2017-10-20 DIAGNOSIS — N39.0 URINARY TRACT INFECTION, SITE NOT SPECIFIED: ICD-10-CM

## 2017-10-20 DIAGNOSIS — L89.153 PRESSURE ULCER OF SACRAL REGION, STAGE 3: ICD-10-CM

## 2017-10-20 DIAGNOSIS — Z09 ENCOUNTER FOR FOLLOW-UP EXAMINATION AFTER COMPLETED TREATMENT FOR CONDITIONS OTHER THAN MALIGNANT NEOPLASM: ICD-10-CM

## 2017-10-20 PROCEDURE — 99214 OFFICE O/P EST MOD 30 MIN: CPT

## 2017-10-20 RX ORDER — LABETALOL HYDROCHLORIDE 200 MG/1
200 TABLET, FILM COATED ORAL
Qty: 540 | Refills: 0 | Status: ACTIVE | COMMUNITY
Start: 2017-07-27

## 2017-10-20 RX ORDER — GABAPENTIN 600 MG/1
600 TABLET, COATED ORAL 3 TIMES DAILY
Qty: 90 | Refills: 2 | Status: DISCONTINUED | COMMUNITY
Start: 2017-08-11 | End: 2017-10-20

## 2017-10-20 RX ORDER — BACLOFEN 10 MG/1
10 TABLET ORAL 3 TIMES DAILY
Qty: 180 | Refills: 3 | Status: DISCONTINUED | COMMUNITY
End: 2017-10-20

## 2017-10-23 ENCOUNTER — OUTPATIENT (OUTPATIENT)
Dept: OUTPATIENT SERVICES | Facility: HOSPITAL | Age: 67
LOS: 1 days | End: 2017-10-23
Payer: MEDICARE

## 2017-10-23 ENCOUNTER — TRANSCRIPTION ENCOUNTER (OUTPATIENT)
Age: 67
End: 2017-10-23

## 2017-10-23 DIAGNOSIS — L92.9 GRANULOMATOUS DISORDER OF THE SKIN AND SUBCUTANEOUS TISSUE, UNSPECIFIED: ICD-10-CM

## 2017-10-23 DIAGNOSIS — Z98.89 OTHER SPECIFIED POSTPROCEDURAL STATES: Chronic | ICD-10-CM

## 2017-10-23 LAB
ALBUMIN SERPL ELPH-MCNC: 3.4 G/DL — SIGNIFICANT CHANGE UP (ref 3.3–5)
ALP SERPL-CCNC: 107 U/L — SIGNIFICANT CHANGE UP (ref 40–120)
ALT FLD-CCNC: 15 U/L — SIGNIFICANT CHANGE UP (ref 12–78)
ANION GAP SERPL CALC-SCNC: 6 MMOL/L — SIGNIFICANT CHANGE UP (ref 5–17)
AST SERPL-CCNC: 11 U/L — LOW (ref 15–37)
BASOPHILS # BLD AUTO: 0.1 K/UL — SIGNIFICANT CHANGE UP (ref 0–0.2)
BASOPHILS NFR BLD AUTO: 1 % — SIGNIFICANT CHANGE UP (ref 0–2)
BILIRUB SERPL-MCNC: 0.3 MG/DL — SIGNIFICANT CHANGE UP (ref 0.2–1.2)
BUN SERPL-MCNC: 19 MG/DL — SIGNIFICANT CHANGE UP (ref 7–23)
CALCIUM SERPL-MCNC: 9.2 MG/DL — SIGNIFICANT CHANGE UP (ref 8.5–10.1)
CHLORIDE SERPL-SCNC: 104 MMOL/L — SIGNIFICANT CHANGE UP (ref 96–108)
CO2 SERPL-SCNC: 30 MMOL/L — SIGNIFICANT CHANGE UP (ref 22–31)
CREAT SERPL-MCNC: 0.4 MG/DL — LOW (ref 0.5–1.3)
CRP SERPL-MCNC: 0.4 MG/DL — SIGNIFICANT CHANGE UP (ref 0–0.4)
EOSINOPHIL # BLD AUTO: 0.1 K/UL — SIGNIFICANT CHANGE UP (ref 0–0.5)
EOSINOPHIL NFR BLD AUTO: 1.6 % — SIGNIFICANT CHANGE UP (ref 0–6)
ERYTHROCYTE [SEDIMENTATION RATE] IN BLOOD: 7 MM/HR — SIGNIFICANT CHANGE UP (ref 0–20)
GLUCOSE SERPL-MCNC: 69 MG/DL — LOW (ref 70–99)
HBA1C BLD-MCNC: 5.5 % — SIGNIFICANT CHANGE UP (ref 4–5.6)
HCT VFR BLD CALC: 38.2 % — SIGNIFICANT CHANGE UP (ref 34.5–45)
HGB BLD-MCNC: 11.8 G/DL — SIGNIFICANT CHANGE UP (ref 11.5–15.5)
LYMPHOCYTES # BLD AUTO: 1 K/UL — SIGNIFICANT CHANGE UP (ref 1–3.3)
LYMPHOCYTES # BLD AUTO: 15.6 % — SIGNIFICANT CHANGE UP (ref 13–44)
MCHC RBC-ENTMCNC: 26.9 PG — LOW (ref 27–34)
MCHC RBC-ENTMCNC: 30.8 GM/DL — LOW (ref 32–36)
MCV RBC AUTO: 87.6 FL — SIGNIFICANT CHANGE UP (ref 80–100)
MONOCYTES # BLD AUTO: 0.7 K/UL — SIGNIFICANT CHANGE UP (ref 0–0.9)
MONOCYTES NFR BLD AUTO: 10.6 % — HIGH (ref 1–9)
NEUTROPHILS # BLD AUTO: 4.8 K/UL — SIGNIFICANT CHANGE UP (ref 1.8–7.4)
NEUTROPHILS NFR BLD AUTO: 71.2 % — SIGNIFICANT CHANGE UP (ref 43–77)
PLATELET # BLD AUTO: 214 K/UL — SIGNIFICANT CHANGE UP (ref 150–400)
POTASSIUM SERPL-MCNC: 4 MMOL/L — SIGNIFICANT CHANGE UP (ref 3.5–5.3)
POTASSIUM SERPL-SCNC: 4 MMOL/L — SIGNIFICANT CHANGE UP (ref 3.5–5.3)
PREALB SERPL-MCNC: 21 MG/DL — SIGNIFICANT CHANGE UP (ref 20–40)
PROT SERPL-MCNC: 6.7 G/DL — SIGNIFICANT CHANGE UP (ref 6–8.3)
RBC # BLD: 4.37 M/UL — SIGNIFICANT CHANGE UP (ref 3.8–5.2)
RBC # FLD: 14.4 % — SIGNIFICANT CHANGE UP (ref 10.3–14.5)
SODIUM SERPL-SCNC: 140 MMOL/L — SIGNIFICANT CHANGE UP (ref 135–145)
WBC # BLD: 6.7 K/UL — SIGNIFICANT CHANGE UP (ref 3.8–10.5)
WBC # FLD AUTO: 6.7 K/UL — SIGNIFICANT CHANGE UP (ref 3.8–10.5)

## 2017-10-23 PROCEDURE — 85027 COMPLETE CBC AUTOMATED: CPT

## 2017-10-23 PROCEDURE — 84134 ASSAY OF PREALBUMIN: CPT

## 2017-10-23 PROCEDURE — 86140 C-REACTIVE PROTEIN: CPT

## 2017-10-23 PROCEDURE — 72220 X-RAY EXAM SACRUM TAILBONE: CPT

## 2017-10-23 PROCEDURE — G0463: CPT

## 2017-10-23 PROCEDURE — 85652 RBC SED RATE AUTOMATED: CPT

## 2017-10-23 PROCEDURE — 80053 COMPREHEN METABOLIC PANEL: CPT

## 2017-10-23 PROCEDURE — G0463: CPT | Mod: 25

## 2017-10-23 PROCEDURE — 83036 HEMOGLOBIN GLYCOSYLATED A1C: CPT

## 2017-10-23 PROCEDURE — 17250 CHEM CAUT OF GRANLTJ TISSUE: CPT

## 2017-10-23 PROCEDURE — 72220 X-RAY EXAM SACRUM TAILBONE: CPT | Mod: 26

## 2017-10-24 DIAGNOSIS — R63.6 UNDERWEIGHT: ICD-10-CM

## 2017-10-24 DIAGNOSIS — G83.9 PARALYTIC SYNDROME, UNSPECIFIED: ICD-10-CM

## 2017-10-24 DIAGNOSIS — N39.0 URINARY TRACT INFECTION, SITE NOT SPECIFIED: ICD-10-CM

## 2017-10-24 DIAGNOSIS — H54.7 UNSPECIFIED VISUAL LOSS: ICD-10-CM

## 2017-10-24 DIAGNOSIS — I95.9 HYPOTENSION, UNSPECIFIED: ICD-10-CM

## 2017-10-24 DIAGNOSIS — Z98.890 OTHER SPECIFIED POSTPROCEDURAL STATES: ICD-10-CM

## 2017-10-24 DIAGNOSIS — Z79.899 OTHER LONG TERM (CURRENT) DRUG THERAPY: ICD-10-CM

## 2017-10-24 DIAGNOSIS — L89.153 PRESSURE ULCER OF SACRAL REGION, STAGE 3: ICD-10-CM

## 2017-10-24 DIAGNOSIS — R32 UNSPECIFIED URINARY INCONTINENCE: ICD-10-CM

## 2017-10-24 DIAGNOSIS — Z87.891 PERSONAL HISTORY OF NICOTINE DEPENDENCE: ICD-10-CM

## 2017-10-24 DIAGNOSIS — Z88.8 ALLERGY STATUS TO OTHER DRUGS, MEDICAMENTS AND BIOLOGICAL SUBSTANCES: ICD-10-CM

## 2017-10-24 DIAGNOSIS — L92.9 GRANULOMATOUS DISORDER OF THE SKIN AND SUBCUTANEOUS TISSUE, UNSPECIFIED: ICD-10-CM

## 2017-10-27 ENCOUNTER — RX RENEWAL (OUTPATIENT)
Age: 67
End: 2017-10-27

## 2017-11-03 ENCOUNTER — RX RENEWAL (OUTPATIENT)
Age: 67
End: 2017-11-03

## 2017-11-03 RX ORDER — VALACYCLOVIR 1 G/1
1 TABLET, FILM COATED ORAL DAILY
Qty: 30 | Refills: 5 | Status: ACTIVE | COMMUNITY
Start: 2017-10-10 | End: 1900-01-01

## 2017-11-06 ENCOUNTER — OUTPATIENT (OUTPATIENT)
Dept: OUTPATIENT SERVICES | Facility: HOSPITAL | Age: 67
LOS: 1 days | Discharge: HOME | End: 2017-11-06
Payer: MEDICARE

## 2017-11-06 ENCOUNTER — OUTPATIENT (OUTPATIENT)
Dept: OUTPATIENT SERVICES | Facility: HOSPITAL | Age: 67
LOS: 1 days | End: 2017-11-06

## 2017-11-06 DIAGNOSIS — Z98.89 OTHER SPECIFIED POSTPROCEDURAL STATES: Chronic | ICD-10-CM

## 2017-11-06 DIAGNOSIS — N39.0 URINARY TRACT INFECTION, SITE NOT SPECIFIED: ICD-10-CM

## 2017-11-06 DIAGNOSIS — L92.9 GRANULOMATOUS DISORDER OF THE SKIN AND SUBCUTANEOUS TISSUE, UNSPECIFIED: ICD-10-CM

## 2017-11-06 PROCEDURE — 87186 SC STD MICRODIL/AGAR DIL: CPT

## 2017-11-06 PROCEDURE — 87086 URINE CULTURE/COLONY COUNT: CPT

## 2017-11-06 PROCEDURE — 17250 CHEM CAUT OF GRANLTJ TISSUE: CPT

## 2017-11-08 DIAGNOSIS — L89.153 PRESSURE ULCER OF SACRAL REGION, STAGE 3: ICD-10-CM

## 2017-11-08 DIAGNOSIS — R63.6 UNDERWEIGHT: ICD-10-CM

## 2017-11-08 DIAGNOSIS — Z87.891 PERSONAL HISTORY OF NICOTINE DEPENDENCE: ICD-10-CM

## 2017-11-08 DIAGNOSIS — G83.9 PARALYTIC SYNDROME, UNSPECIFIED: ICD-10-CM

## 2017-11-08 DIAGNOSIS — Z79.899 OTHER LONG TERM (CURRENT) DRUG THERAPY: ICD-10-CM

## 2017-11-08 DIAGNOSIS — R32 UNSPECIFIED URINARY INCONTINENCE: ICD-10-CM

## 2017-11-08 DIAGNOSIS — Z98.890 OTHER SPECIFIED POSTPROCEDURAL STATES: ICD-10-CM

## 2017-11-08 DIAGNOSIS — Z88.8 ALLERGY STATUS TO OTHER DRUGS, MEDICAMENTS AND BIOLOGICAL SUBSTANCES STATUS: ICD-10-CM

## 2017-11-08 DIAGNOSIS — L92.9 GRANULOMATOUS DISORDER OF THE SKIN AND SUBCUTANEOUS TISSUE, UNSPECIFIED: ICD-10-CM

## 2017-11-14 RX ORDER — NEOMYCIN AND POLYMYXIN B SULFATES AND DEXAMETHASONE 3.5; 10000; 1 MG/G; [IU]/G; MG/G
3.5-10000-0.1 OINTMENT OPHTHALMIC 4 TIMES DAILY
Qty: 1 | Refills: 5 | Status: ACTIVE | COMMUNITY
Start: 2017-11-14 | End: 1900-01-01

## 2017-11-14 RX ORDER — PREDNISOLONE ACETATE 10 MG/ML
1 SUSPENSION/ DROPS OPHTHALMIC
Qty: 1 | Refills: 3 | Status: ACTIVE | COMMUNITY
Start: 2017-11-14 | End: 1900-01-01

## 2017-11-16 ENCOUNTER — RX RENEWAL (OUTPATIENT)
Age: 67
End: 2017-11-16

## 2017-11-17 ENCOUNTER — APPOINTMENT (OUTPATIENT)
Dept: PHYSICAL MEDICINE AND REHAB | Facility: CLINIC | Age: 67
End: 2017-11-17
Payer: MEDICARE

## 2017-11-17 ENCOUNTER — RX RENEWAL (OUTPATIENT)
Age: 67
End: 2017-11-17

## 2017-11-17 VITALS
OXYGEN SATURATION: 95 % | TEMPERATURE: 97.7 F | SYSTOLIC BLOOD PRESSURE: 146 MMHG | HEART RATE: 68 BPM | DIASTOLIC BLOOD PRESSURE: 68 MMHG

## 2017-11-17 PROCEDURE — 64642 CHEMODENERV 1 EXTREMITY 1-4: CPT

## 2017-11-17 PROCEDURE — 99212 OFFICE O/P EST SF 10 MIN: CPT | Mod: 25

## 2017-11-17 PROCEDURE — 64643 CHEMODENERV 1 EXTREM 1-4 EA: CPT

## 2017-11-17 PROCEDURE — 95873 GUIDE NERV DESTR ELEC STIM: CPT

## 2017-11-20 ENCOUNTER — OUTPATIENT (OUTPATIENT)
Dept: OUTPATIENT SERVICES | Facility: HOSPITAL | Age: 67
LOS: 1 days | Discharge: HOME | End: 2017-11-20
Payer: MEDICARE

## 2017-11-20 DIAGNOSIS — Z98.89 OTHER SPECIFIED POSTPROCEDURAL STATES: Chronic | ICD-10-CM

## 2017-11-20 DIAGNOSIS — L92.9 GRANULOMATOUS DISORDER OF THE SKIN AND SUBCUTANEOUS TISSUE, UNSPECIFIED: ICD-10-CM

## 2017-11-20 PROCEDURE — G0463: CPT

## 2017-11-22 DIAGNOSIS — L89.153 PRESSURE ULCER OF SACRAL REGION, STAGE 3: ICD-10-CM

## 2017-11-22 DIAGNOSIS — Z88.8 ALLERGY STATUS TO OTHER DRUGS, MEDICAMENTS AND BIOLOGICAL SUBSTANCES STATUS: ICD-10-CM

## 2017-11-22 DIAGNOSIS — Z87.891 PERSONAL HISTORY OF NICOTINE DEPENDENCE: ICD-10-CM

## 2017-11-22 DIAGNOSIS — G83.9 PARALYTIC SYNDROME, UNSPECIFIED: ICD-10-CM

## 2017-11-22 DIAGNOSIS — Z79.899 OTHER LONG TERM (CURRENT) DRUG THERAPY: ICD-10-CM

## 2017-11-22 DIAGNOSIS — R63.6 UNDERWEIGHT: ICD-10-CM

## 2017-11-22 DIAGNOSIS — R32 UNSPECIFIED URINARY INCONTINENCE: ICD-10-CM

## 2017-11-22 DIAGNOSIS — Z98.890 OTHER SPECIFIED POSTPROCEDURAL STATES: ICD-10-CM

## 2017-12-01 ENCOUNTER — MEDICATION RENEWAL (OUTPATIENT)
Age: 67
End: 2017-12-01

## 2017-12-04 ENCOUNTER — OTHER (OUTPATIENT)
Age: 67
End: 2017-12-04

## 2017-12-06 ENCOUNTER — APPOINTMENT (OUTPATIENT)
Dept: CT IMAGING | Facility: CLINIC | Age: 67
End: 2017-12-06

## 2017-12-06 ENCOUNTER — OUTPATIENT (OUTPATIENT)
Dept: OUTPATIENT SERVICES | Facility: HOSPITAL | Age: 67
LOS: 1 days | End: 2017-12-06
Payer: MEDICARE

## 2017-12-06 DIAGNOSIS — Z00.8 ENCOUNTER FOR OTHER GENERAL EXAMINATION: ICD-10-CM

## 2017-12-06 DIAGNOSIS — Z98.89 OTHER SPECIFIED POSTPROCEDURAL STATES: Chronic | ICD-10-CM

## 2017-12-06 PROCEDURE — 74174 CTA ABD&PLVS W/CONTRAST: CPT

## 2017-12-06 PROCEDURE — 74174 CTA ABD&PLVS W/CONTRAST: CPT | Mod: 26

## 2017-12-06 PROCEDURE — 82565 ASSAY OF CREATININE: CPT

## 2017-12-07 ENCOUNTER — OUTPATIENT (OUTPATIENT)
Dept: OUTPATIENT SERVICES | Facility: HOSPITAL | Age: 67
LOS: 1 days | Discharge: HOME | End: 2017-12-07
Payer: MEDICARE

## 2017-12-07 DIAGNOSIS — Z98.89 OTHER SPECIFIED POSTPROCEDURAL STATES: Chronic | ICD-10-CM

## 2017-12-07 DIAGNOSIS — S81.801D UNSPECIFIED OPEN WOUND, RIGHT LOWER LEG, SUBSEQUENT ENCOUNTER: ICD-10-CM

## 2017-12-07 PROCEDURE — 17250 CHEM CAUT OF GRANLTJ TISSUE: CPT

## 2017-12-08 ENCOUNTER — APPOINTMENT (OUTPATIENT)
Dept: CARDIOTHORACIC SURGERY | Facility: CLINIC | Age: 67
End: 2017-12-08

## 2017-12-09 DIAGNOSIS — Z87.891 PERSONAL HISTORY OF NICOTINE DEPENDENCE: ICD-10-CM

## 2017-12-09 DIAGNOSIS — L89.153 PRESSURE ULCER OF SACRAL REGION, STAGE 3: ICD-10-CM

## 2017-12-09 DIAGNOSIS — Z88.8 ALLERGY STATUS TO OTHER DRUGS, MEDICAMENTS AND BIOLOGICAL SUBSTANCES STATUS: ICD-10-CM

## 2017-12-09 DIAGNOSIS — Z79.899 OTHER LONG TERM (CURRENT) DRUG THERAPY: ICD-10-CM

## 2017-12-09 DIAGNOSIS — L92.9 GRANULOMATOUS DISORDER OF THE SKIN AND SUBCUTANEOUS TISSUE, UNSPECIFIED: ICD-10-CM

## 2017-12-09 DIAGNOSIS — Z98.890 OTHER SPECIFIED POSTPROCEDURAL STATES: ICD-10-CM

## 2017-12-09 DIAGNOSIS — R63.6 UNDERWEIGHT: ICD-10-CM

## 2017-12-09 DIAGNOSIS — R32 UNSPECIFIED URINARY INCONTINENCE: ICD-10-CM

## 2017-12-09 DIAGNOSIS — G83.9 PARALYTIC SYNDROME, UNSPECIFIED: ICD-10-CM

## 2017-12-17 ENCOUNTER — RX RENEWAL (OUTPATIENT)
Age: 67
End: 2017-12-17

## 2017-12-17 RX ORDER — PREDNISOLONE ACETATE 10 MG/ML
1 SUSPENSION/ DROPS OPHTHALMIC
Qty: 1 | Refills: 5 | Status: ACTIVE | COMMUNITY
Start: 2017-12-17 | End: 1900-01-01

## 2017-12-20 ENCOUNTER — EMERGENCY (EMERGENCY)
Facility: HOSPITAL | Age: 67
LOS: 1 days | Discharge: HOME | End: 2017-12-20
Attending: PERSONAL EMERGENCY RESPONSE ATTENDANT | Admitting: PERSONAL EMERGENCY RESPONSE ATTENDANT
Payer: MEDICARE

## 2017-12-20 VITALS — HEART RATE: 108 BPM | SYSTOLIC BLOOD PRESSURE: 163 MMHG | DIASTOLIC BLOOD PRESSURE: 89 MMHG | RESPIRATION RATE: 16 BRPM

## 2017-12-20 VITALS
HEART RATE: 88 BPM | SYSTOLIC BLOOD PRESSURE: 156 MMHG | RESPIRATION RATE: 18 BRPM | TEMPERATURE: 98 F | DIASTOLIC BLOOD PRESSURE: 80 MMHG | OXYGEN SATURATION: 98 %

## 2017-12-20 DIAGNOSIS — Z98.89 OTHER SPECIFIED POSTPROCEDURAL STATES: Chronic | ICD-10-CM

## 2017-12-20 LAB
ALBUMIN SERPL ELPH-MCNC: 4.3 G/DL — SIGNIFICANT CHANGE UP (ref 3.3–5)
ALP SERPL-CCNC: 103 U/L — SIGNIFICANT CHANGE UP (ref 40–120)
ALT FLD-CCNC: 12 U/L RC — SIGNIFICANT CHANGE UP (ref 10–45)
AMPHET UR-MCNC: NEGATIVE — SIGNIFICANT CHANGE UP
ANION GAP SERPL CALC-SCNC: 11 MMOL/L — SIGNIFICANT CHANGE UP (ref 5–17)
APPEARANCE UR: ABNORMAL
APTT BLD: 34 SEC — SIGNIFICANT CHANGE UP (ref 27.5–37.4)
AST SERPL-CCNC: 35 U/L — SIGNIFICANT CHANGE UP (ref 10–40)
BACTERIA # UR AUTO: ABNORMAL /HPF
BARBITURATES UR SCN-MCNC: NEGATIVE — SIGNIFICANT CHANGE UP
BASE EXCESS BLDV CALC-SCNC: 2.2 MMOL/L — HIGH (ref -2–2)
BASOPHILS # BLD AUTO: 0 K/UL — SIGNIFICANT CHANGE UP (ref 0–0.2)
BASOPHILS NFR BLD AUTO: 0.2 % — SIGNIFICANT CHANGE UP (ref 0–2)
BENZODIAZ UR-MCNC: NEGATIVE — SIGNIFICANT CHANGE UP
BILIRUB SERPL-MCNC: 0.3 MG/DL — SIGNIFICANT CHANGE UP (ref 0.2–1.2)
BILIRUB UR-MCNC: NEGATIVE — SIGNIFICANT CHANGE UP
BUN SERPL-MCNC: 26 MG/DL — HIGH (ref 7–23)
CA-I SERPL-SCNC: 1.25 MMOL/L — SIGNIFICANT CHANGE UP (ref 1.12–1.3)
CALCIUM SERPL-MCNC: 9.3 MG/DL — SIGNIFICANT CHANGE UP (ref 8.4–10.5)
CHLORIDE BLDV-SCNC: 99 MMOL/L — SIGNIFICANT CHANGE UP (ref 96–108)
CHLORIDE SERPL-SCNC: 98 MMOL/L — SIGNIFICANT CHANGE UP (ref 96–108)
CO2 BLDV-SCNC: 31 MMOL/L — HIGH (ref 22–30)
CO2 SERPL-SCNC: 26 MMOL/L — SIGNIFICANT CHANGE UP (ref 22–31)
COCAINE METAB.OTHER UR-MCNC: NEGATIVE — SIGNIFICANT CHANGE UP
COLOR SPEC: YELLOW — SIGNIFICANT CHANGE UP
CREAT SERPL-MCNC: 0.54 MG/DL — SIGNIFICANT CHANGE UP (ref 0.5–1.3)
DIFF PNL FLD: ABNORMAL
EOSINOPHIL # BLD AUTO: 0 K/UL — SIGNIFICANT CHANGE UP (ref 0–0.5)
EOSINOPHIL NFR BLD AUTO: 0.1 % — SIGNIFICANT CHANGE UP (ref 0–6)
GAS PNL BLDV: 137 MMOL/L — SIGNIFICANT CHANGE UP (ref 136–145)
GAS PNL BLDV: SIGNIFICANT CHANGE UP
GAS PNL BLDV: SIGNIFICANT CHANGE UP
GLUCOSE BLDV-MCNC: 111 MG/DL — HIGH (ref 70–99)
GLUCOSE SERPL-MCNC: 105 MG/DL — HIGH (ref 70–99)
GLUCOSE UR QL: NEGATIVE — SIGNIFICANT CHANGE UP
HCO3 BLDV-SCNC: 29 MMOL/L — SIGNIFICANT CHANGE UP (ref 21–29)
HCT VFR BLD CALC: 41.9 % — SIGNIFICANT CHANGE UP (ref 34.5–45)
HCT VFR BLDA CALC: 43 % — SIGNIFICANT CHANGE UP (ref 39–50)
HGB BLD CALC-MCNC: 13.9 G/DL — SIGNIFICANT CHANGE UP (ref 11.5–15.5)
HGB BLD-MCNC: 13.9 G/DL — SIGNIFICANT CHANGE UP (ref 11.5–15.5)
HOROWITZ INDEX BLDV+IHG-RTO: SIGNIFICANT CHANGE UP
INR BLD: 1.08 RATIO — SIGNIFICANT CHANGE UP (ref 0.88–1.16)
KETONES UR-MCNC: ABNORMAL
LACTATE BLDV-MCNC: 1.3 MMOL/L — SIGNIFICANT CHANGE UP (ref 0.7–2)
LEUKOCYTE ESTERASE UR-ACNC: ABNORMAL
LYMPHOCYTES # BLD AUTO: 0.9 K/UL — LOW (ref 1–3.3)
LYMPHOCYTES # BLD AUTO: 10.9 % — LOW (ref 13–44)
MAGNESIUM SERPL-MCNC: 2.2 MG/DL — SIGNIFICANT CHANGE UP (ref 1.6–2.6)
MCHC RBC-ENTMCNC: 29.1 PG — SIGNIFICANT CHANGE UP (ref 27–34)
MCHC RBC-ENTMCNC: 33.3 GM/DL — SIGNIFICANT CHANGE UP (ref 32–36)
MCV RBC AUTO: 87.4 FL — SIGNIFICANT CHANGE UP (ref 80–100)
METHADONE UR-MCNC: POSITIVE
MONOCYTES # BLD AUTO: 0.6 K/UL — SIGNIFICANT CHANGE UP (ref 0–0.9)
MONOCYTES NFR BLD AUTO: 7.5 % — SIGNIFICANT CHANGE UP (ref 2–14)
NEUTROPHILS # BLD AUTO: 7 K/UL — SIGNIFICANT CHANGE UP (ref 1.8–7.4)
NEUTROPHILS NFR BLD AUTO: 81.3 % — HIGH (ref 43–77)
NITRITE UR-MCNC: POSITIVE
OPIATES UR-MCNC: NEGATIVE — SIGNIFICANT CHANGE UP
OXYCODONE UR-MCNC: 99 — SIGNIFICANT CHANGE UP
PCO2 BLDV: 58 MMHG — HIGH (ref 35–50)
PCP SPEC-MCNC: SIGNIFICANT CHANGE UP
PCP UR-MCNC: NEGATIVE — SIGNIFICANT CHANGE UP
PH BLDV: 7.32 — LOW (ref 7.35–7.45)
PH UR: 6 — SIGNIFICANT CHANGE UP (ref 5–8)
PHOSPHATE SERPL-MCNC: 3.6 MG/DL — SIGNIFICANT CHANGE UP (ref 2.5–4.5)
PLATELET # BLD AUTO: 317 K/UL — SIGNIFICANT CHANGE UP (ref 150–400)
PO2 BLDV: 21 MMHG — LOW (ref 25–45)
POTASSIUM BLDV-SCNC: 3.7 MMOL/L — SIGNIFICANT CHANGE UP (ref 3.5–5)
POTASSIUM SERPL-MCNC: 5.2 MMOL/L — SIGNIFICANT CHANGE UP (ref 3.5–5.3)
POTASSIUM SERPL-SCNC: 5.2 MMOL/L — SIGNIFICANT CHANGE UP (ref 3.5–5.3)
PROT SERPL-MCNC: 7.2 G/DL — SIGNIFICANT CHANGE UP (ref 6–8.3)
PROT UR-MCNC: 300 MG/DL
PROTHROM AB SERPL-ACNC: 11.8 SEC — SIGNIFICANT CHANGE UP (ref 9.8–12.7)
RBC # BLD: 4.79 M/UL — SIGNIFICANT CHANGE UP (ref 3.8–5.2)
RBC # FLD: 14.6 % — HIGH (ref 10.3–14.5)
RBC CASTS # UR COMP ASSIST: ABNORMAL /HPF (ref 0–2)
SAO2 % BLDV: 24 % — LOW (ref 67–88)
SODIUM SERPL-SCNC: 135 MMOL/L — SIGNIFICANT CHANGE UP (ref 135–145)
SP GR SPEC: 1.03 — HIGH (ref 1.01–1.02)
THC UR QL: NEGATIVE — SIGNIFICANT CHANGE UP
UROBILINOGEN FLD QL: NEGATIVE — SIGNIFICANT CHANGE UP
WBC # BLD: 8.6 K/UL — SIGNIFICANT CHANGE UP (ref 3.8–10.5)
WBC # FLD AUTO: 8.6 K/UL — SIGNIFICANT CHANGE UP (ref 3.8–10.5)
WBC UR QL: >50 /HPF (ref 0–5)

## 2017-12-20 PROCEDURE — 81001 URINALYSIS AUTO W/SCOPE: CPT

## 2017-12-20 PROCEDURE — 71045 X-RAY EXAM CHEST 1 VIEW: CPT

## 2017-12-20 PROCEDURE — 96375 TX/PRO/DX INJ NEW DRUG ADDON: CPT

## 2017-12-20 PROCEDURE — 70450 CT HEAD/BRAIN W/O DYE: CPT | Mod: 26

## 2017-12-20 PROCEDURE — 87040 BLOOD CULTURE FOR BACTERIA: CPT

## 2017-12-20 PROCEDURE — 85610 PROTHROMBIN TIME: CPT

## 2017-12-20 PROCEDURE — 70450 CT HEAD/BRAIN W/O DYE: CPT

## 2017-12-20 PROCEDURE — 85730 THROMBOPLASTIN TIME PARTIAL: CPT

## 2017-12-20 PROCEDURE — 84132 ASSAY OF SERUM POTASSIUM: CPT

## 2017-12-20 PROCEDURE — 84295 ASSAY OF SERUM SODIUM: CPT

## 2017-12-20 PROCEDURE — 99284 EMERGENCY DEPT VISIT MOD MDM: CPT | Mod: 25

## 2017-12-20 PROCEDURE — 71010: CPT | Mod: 26

## 2017-12-20 PROCEDURE — 82330 ASSAY OF CALCIUM: CPT

## 2017-12-20 PROCEDURE — 85027 COMPLETE CBC AUTOMATED: CPT

## 2017-12-20 PROCEDURE — 93005 ELECTROCARDIOGRAM TRACING: CPT

## 2017-12-20 PROCEDURE — 87086 URINE CULTURE/COLONY COUNT: CPT

## 2017-12-20 PROCEDURE — 82947 ASSAY GLUCOSE BLOOD QUANT: CPT

## 2017-12-20 PROCEDURE — 84100 ASSAY OF PHOSPHORUS: CPT

## 2017-12-20 PROCEDURE — 85014 HEMATOCRIT: CPT

## 2017-12-20 PROCEDURE — 80307 DRUG TEST PRSMV CHEM ANLYZR: CPT

## 2017-12-20 PROCEDURE — 83605 ASSAY OF LACTIC ACID: CPT

## 2017-12-20 PROCEDURE — 73610 X-RAY EXAM OF ANKLE: CPT

## 2017-12-20 PROCEDURE — 80053 COMPREHEN METABOLIC PANEL: CPT

## 2017-12-20 PROCEDURE — 93010 ELECTROCARDIOGRAM REPORT: CPT

## 2017-12-20 PROCEDURE — 82803 BLOOD GASES ANY COMBINATION: CPT

## 2017-12-20 PROCEDURE — 82435 ASSAY OF BLOOD CHLORIDE: CPT

## 2017-12-20 PROCEDURE — 83735 ASSAY OF MAGNESIUM: CPT

## 2017-12-20 PROCEDURE — 73610 X-RAY EXAM OF ANKLE: CPT | Mod: 26,RT

## 2017-12-20 PROCEDURE — 96374 THER/PROPH/DIAG INJ IV PUSH: CPT

## 2017-12-20 RX ORDER — ACETAMINOPHEN 500 MG
1000 TABLET ORAL ONCE
Qty: 0 | Refills: 0 | Status: COMPLETED | OUTPATIENT
Start: 2017-12-20 | End: 2017-12-20

## 2017-12-20 RX ORDER — CEFTRIAXONE 500 MG/1
1 INJECTION, POWDER, FOR SOLUTION INTRAMUSCULAR; INTRAVENOUS ONCE
Qty: 0 | Refills: 0 | Status: COMPLETED | OUTPATIENT
Start: 2017-12-20 | End: 2017-12-20

## 2017-12-20 RX ORDER — CEFPODOXIME PROXETIL 100 MG
1 TABLET ORAL
Qty: 14 | Refills: 0
Start: 2017-12-20 | End: 2017-12-26

## 2017-12-20 RX ORDER — ONDANSETRON 8 MG/1
4 TABLET, FILM COATED ORAL ONCE
Qty: 0 | Refills: 0 | Status: COMPLETED | OUTPATIENT
Start: 2017-12-20 | End: 2017-12-20

## 2017-12-20 RX ADMIN — ONDANSETRON 4 MILLIGRAM(S): 8 TABLET, FILM COATED ORAL at 18:36

## 2017-12-20 RX ADMIN — CEFTRIAXONE 100 GRAM(S): 500 INJECTION, POWDER, FOR SOLUTION INTRAMUSCULAR; INTRAVENOUS at 18:36

## 2017-12-20 RX ADMIN — Medication 1000 MILLIGRAM(S): at 17:39

## 2017-12-20 RX ADMIN — Medication 400 MILLIGRAM(S): at 17:04

## 2017-12-20 NOTE — ED ADULT NURSE REASSESSMENT NOTE - NS ED NURSE REASSESS COMMENT FT1
patient is resting in the [facility] waiting for dispo. c/o of pain and was medicated as per md orders. will continue to monitor. vss/nad.

## 2017-12-20 NOTE — ED PROVIDER NOTE - CARE PLAN
Principal Discharge DX:	UTI (urinary tract infection) Principal Discharge DX:	UTI (urinary tract infection)  Instructions for follow-up, activity and diet:	1. Take all medications as previously prescribed. Additionally take cefpodoxime 1 tab 2 times a day for 1 week  2. Follow up with your primary care Dr. rBanham in the next 1-2 days  3. Return to ED for change of symptoms including worsening headache, continued altered mental status, Principal Discharge DX:	UTI (urinary tract infection)  Instructions for follow-up, activity and diet:	1. Take all medications as previously prescribed. Additionally take cefpodoxime 1 tab 2 times a day for 1 week  2. Follow up with your primary care Dr. Branham in the next 1-2 days  3. Return to ED for change of symptoms including worsening headache, continued altered mental status, fevers, chills and any other symptoms of concern

## 2017-12-20 NOTE — ED PROVIDER NOTE - ATTENDING CONTRIBUTION TO CARE
Attending MD Auguste.  Agree with above. Pt is a 66 yr old female with hx of spontaneous epidural bleed 2014 since which she's had bilateral LE paralysis with surg after that, freq UTI's, sacral wound presenting to Ed with complaint of 2-3 days inc AMS worsened over few days.   states she's had similar confusion/forgetfulness preivously 2/2 'her medication, infection, bleed'.  At home  states that she is not at her typical baseline.  PCP is Basil Branham.  Concern for infection vs. recurrent brain pathology vs. poss drug effect.  Pt is otehrwise at her neurologic baseline.  No new weakness.

## 2017-12-20 NOTE — ED ADULT NURSE NOTE - FINAL NURSING ELECTRONIC SIGNATURE
Regarding: Robinson/Blood in urine  ----- Message from Maritza Santana sent at 1/7/2017  8:26 AM CST -----  Patient Name: Nigel Torres  Specialist or PCP:Robinson  Pregnant (If Yes, how long?):na  Symptoms:Blood in urine  Call Back #:[phone]  Is the patient’s permanent residence located in [place], [place], or a [facility] Yes [facility] [id number]  Call Center Account #:[id number]       20-Dec-2017 20:36

## 2017-12-20 NOTE — ED PROVIDER NOTE - PROGRESS NOTE DETAILS
[employer]:   updated re: [employer]:   updated re: acute UTI findings.  Labs and renal function non-actionable.   requesting that Dr. Barriga be called re: concern for missed outpt appt for follow-up of aortic surg.  Dr. Barriga called and advised re: presentation.  He is not in house currently and is aware of her visit.  Will follow-up with her as outpt.  Pt has no complaint today c/w CP/abdominal pain concerning for acute aortic pathology. Will manage for UTI and age indeterminate pontine infarct on CT.  Previous CT head reviewed and does not indicate previous pontine stroke.  Family updated re: pontine infarct concern and neurology consulted.  Stable at time of signout to incoming team pending neuro consult, UTI tx and decision re: inpt tx of UTI with cephalosporin given previous resistance profile vs. outpt tx and neuro follow-up. [employer]:  Neuro has seen Ms. Portillo and state that sxs are typical for UTI.  Finding is c/w age indeterm.  [phone] follow-up with Dr. Rodrigues.  Start ASA 81 mg. [employer]:  Neuro has seen Ms. Portillo and state that sxs are typical for UTI.  Finding is c/w age indeterm.  [phone] follow-up with Dr. Rodrigues.  Start ASA 81 mg.  Plan to obtain CXR and d/c with PO abxs for tx of UTI.   offered admission but states that he feels they can manage at home and will return for new/worsening sxs including worsened AMS, fevers, inabilityi to tolerate pO.  Stable at time of signout to incoming team pendign CXR and d/c. Attending MD Rosario: Patient re-evaluated and feeling improved.  No acute issues at  this time.  Lab and radiology tests reviewed with patient.  Patient stable for discharge. Follow up instructions given, importance of follow up emphasized, return to ED parameters reviewed and patient verbalized understanding.  All questions answered, all concerns addressed. First dose abx here, Rx Cefpodoxime

## 2017-12-20 NOTE — ED PROVIDER NOTE - PHYSICAL EXAMINATION
CONSTITUTIONAL: Patient is lethargic, alert and oriented x 2. Patient is ill appearing and in no acute distress.  HEAD: NCAT,   EYES: Right PERRL , EOMI,  ENT:Airway patent, Nasal mucosa clear. Mouth with normal mucosa. Throat has no vesicles, no oropharyngeal exudates and uvula is midline.  NECK: supple, No LAD,  LUNGS: CTA B/L,  HEART: RRR.+S1S2 no murmurs,   ABDOMEN: Soft nd/nt+bs no rebound or guarding.   EXTREMITY: no edema or calf tenderness b/l, FROM upper and lower ext b/l  SKIN: + sacral wound  NEURO: Cn3-12 grossly intact. Strength5/5UE.

## 2017-12-20 NOTE — ED PROVIDER NOTE - PLAN OF CARE
1. Take all medications as previously prescribed. Additionally take cefpodoxime 1 tab 2 times a day for 1 week  2. Follow up with your primary care Dr. Branham in the next 1-2 days  3. Return to ED for change of symptoms including worsening headache, continued altered mental status, 1. Take all medications as previously prescribed. Additionally take cefpodoxime 1 tab 2 times a day for 1 week  2. Follow up with your primary care Dr. Branham in the next 1-2 days  3. Return to ED for change of symptoms including worsening headache, continued altered mental status, fevers, chills and any other symptoms of concern

## 2017-12-20 NOTE — ED PROVIDER NOTE - OBJECTIVE STATEMENT
66 year old female with a PMH of 66 year old female with a PMH of spontaneous epidural bleed with 66 year old female with a PMH of spontaneous epidural bleed 2014 with b/l lower extremity paralysis, frequent utis, sacral wound, aortic dissection 2009, chronic pain presents w/  w/ complaints of AMS. As per patients rubalcava 66 year old female with a PMH of spontaneous epidural bleed 2014 with b/l lower extremity paralysis, frequent utis, sacral wound, aortic dissection 2009, chronic pain presents w/  w/ complaints of AMS. As per patients  patient has been acting differently at home more forgetful than normal with decreased PO intake. He states that patient has presented this way with previous infections in the past but is also concerned she may have taken too much of her home pain meds. Patient reports abdominal pain 66 year old female with a PMH of spontaneous epidural bleed 2014 with b/l lower extremity paralysis, frequent utis(pt self cath at home), sacral wound, aortic dissection 2009, chronic pain presents w/  w/ complaints of AMS. As per patients  patient has been acting differently at home more forgetful than normal with decreased PO intake and a headache. He states that patient has presented this way with previous infections in the past but is also concerned she may have taken too much of her home pain meds. Denies abdominal pain, n/v, fevers, chills, new neuro changes.

## 2017-12-20 NOTE — CONSULT NOTE ADULT - SUBJECTIVE AND OBJECTIVE BOX
Neurology Consult    Reason for consult: CT finding of age indeterminate lucency in L rafaela    HPI: 66 year old female presenting to the ED with AMS found to have UTI. Patient has PMH of spontaneously epidural bleed ( resulting in paraplegia), aortic dissection, HTN, chronic UTI (straight caths daily), migraines. As per  at bedside, patient takes oxycodone and methadone for pain management. For the past 3-4 days patient had been confused and this is typical of her behavior whenever she has UTIs.  states in the past patient would have even worse AMS/behavior issues with UTI. Patient reports left sided headache and nausea. Denies numbness, weakness, tingling, changes in vision, changes in hearing. At baseline patient is wheelchair bound.    REVIEW OF SYSTEMS:  Constitutional: No fever, chills, fatigue, weakness.  Eyes: No eye pain, visual disturbances, or discharge.  ENT:  No difficulty hearing, tinnitus, vertigo. No sinus or throat pain.  Neck: No pain or stiffness.  Respiratory: No cough, dyspnea, wheezing.  Cardiovascular: No chest pain, palpitations.  Gastrointestinal: No abdominal pain. No nausea, vomiting, diarrhea, or constipation.   Genitourinary: No dysuria, frequency, hematuria or incontinence.  Neurological: Headaches, no lightheadedness, vertigo, numbness or tremors.  Psychiatric: No depression, anxiety, mood swings, or difficulty sleeping.  Musculoskeletal: No joint pain or swelling. No muscle, back, or extremity pain.  Skin: No itching, burning, rashes or lesions.   Lymph Nodes: No enlarged glands  Endocrine: No heat or cold intolerance, no hair loss.  Allergy and Immunologic: No hives or eczema.    MEDICATIONS  Oxycodone  Methadone    PMH: Paraplegia  Aortic dissection, abdominal  Aortic dissection, thoracic  Blindness of left eye  Chronic constipation  Subdural hematoma  UTI (urinary tract infection)  TIA (transient ischemic attack)  Aortic Dissection, Abdominal  HTN (Hypertension)    PSH: S/P aortic dissection repair  H/O Spinal surgery  Aneurysm Repair, Abdominal Aortic    FAMILY HISTORY:  No pertinent family history in first degree relatives  No history of dementia, strokes, or seizures     SOCIAL HISTORY:  No history of tobacco or alcohol use     Allergies  Cipro (Rash)    Vital Signs Last 24 Hrs  T(C): 36.4 (20 Dec 2017 16:46), Max: 36.4 (20 Dec 2017 16:46)  T(F): 97.6 (20 Dec 2017 16:46), Max: 97.6 (20 Dec 2017 16:46)  HR: 102 (20 Dec 2017 16:46) (95 - 108)  BP: 134/91 (20 Dec 2017 16:46) (134/91 - 178/72)  RR: 16 (20 Dec 2017 13:17) (16 - 16)  SpO2: 96% (20 Dec 2017 16:46) (95% - 96%)    Neurological Examination:    Mental Status: Patient is alert, awake, oriented to self and hospital. States she does not know the month or year. Patient is fluent, no dysarthria, no aphasia. Follows commands well and able to answer questions appropriately.     Cranial Nerves: Blind in left eye from cataracts, right pupil responsive to light, V1-V3 intact, no gross facial asymmetry, tongue/uvula midline    Motor Exam:   Right upper extremity: No drift 5/5  Left upper extremity: No drift 5/5  Right lower extremity: Contracted  Left lower extremity: Contracted    Sensory: Intact to light touch bilaterally    Coordination: Finger to nose intact bilaterally     Reflexes: 2+ Biceps, 2+ Brachial, 0 Patellar, 0 Ankle    GENERAL: No acute distress  HEENT:  Normocephalic, atraumatic  LUNGS: Clear air entry bilaterally, no wheeze  HEART: Regular rate and rhythm, normal S1 S2, no murmur  ABDOMEN: Soft, nontender, nondistended  EXTREMITIES: No edema, clubbing, cyanosis  MUSCULOSKELETAL: Normal range of motion  SKIN: No rashes    LABS:  CBC Full  -  ( 20 Dec 2017 14:30 )  WBC Count : 8.6 K/uL  Hemoglobin : 13.9 g/dL  Hematocrit : 41.9 %  Platelet Count - Automated : 317 K/uL  Mean Cell Volume : 87.4 fl  Mean Cell Hemoglobin : 29.1 pg  Mean Cell Hemoglobin Concentration : 33.3 gm/dL  Auto Neutrophil # : 7.0 K/uL  Auto Lymphocyte # : 0.9 K/uL  Auto Monocyte # : 0.6 K/uL  Auto Eosinophil # : 0.0 K/uL  Auto Basophil # : 0.0 K/uL  Auto Neutrophil % : 81.3 %  Auto Lymphocyte % : 10.9 %  Auto Monocyte % : 7.5 %  Auto Eosinophil % : 0.1 %  Auto Basophil % : 0.2 %    Urinalysis Basic - ( 20 Dec 2017 14:30 )    Color: Yellow / Appearance: SL Turbid / S.026 / pH: x  Gluc: x / Ketone: Trace  / Bili: Negative / Urobili: Negative   Blood: x / Protein: 300 mg/dL / Nitrite: Positive   Leuk Esterase: Moderate / RBC: 5-10 /HPF / WBC >50 /HPF   Sq Epi: x / Non Sq Epi: x / Bacteria: Many /HPF    12-    135  |  98  |  26<H>  ----------------------------<  105<H>  5.2   |  26  |  0.54    Ca    9.3      20 Dec 2017 14:30  Phos  3.6     12-  Mg     2.2     12    TPro  7.2  /  Alb  4.3  /  TBili  0.3  /  DBili  x   /  AST  35  /  ALT  12  /  AlkPhos  103  12-20    LIVER FUNCTIONS - ( 20 Dec 2017 14:30 )  Alb: 4.3 g/dL / Pro: 7.2 g/dL / ALK PHOS: 103 U/L / ALT: 12 U/L RC / AST: 35 U/L / GGT: x           PT/INR - ( 20 Dec 2017 14:30 )   PT: 11.8 sec;   INR: 1.08 ratio      PTT - ( 20 Dec 2017 14:30 )  PTT:34.0 sec    RADIOLOGY:  CT head: Subtle lucency within the left rafaela which may represent an infarct of indeterminate age.

## 2017-12-20 NOTE — CONSULT NOTE ADULT - ASSESSMENT
66 year old female presenting to the ED with AMS found to have UTI. Patient has PMH of spontaneously epidural bleed (2014 resulting in paraplegia), aortic dissection, HTN, chronic UTI (straight caths daily), migraines. As per  at bedside, patient takes oxycodone and methadone for pain management. For the past 3-4 days patient had been confused and this is typical of her behavior whenever she has UTIs.  states in the past patient would have even worse AMS/behavior issues with UTI. Patient reports left sided headache and nausea. Denies numbness, weakness, tingling, changes in vision, changes in hearing. At baseline patient is wheelchair bound.  Neuro exam nonfocal.  CT head with possible age indeterminate infarct in left rafaela.    Metabolic encephalopathy secondary to UTI  Incidental finding of age indeterminate infarct in left rafaela    Recommend:  Continue medical management of UTI  ASA 81 mg PO QD  Outpatient follow up with Dr. Rodrigues for further workup of CT findings [phone]

## 2017-12-21 LAB
CULTURE RESULTS: SIGNIFICANT CHANGE UP
SPECIMEN SOURCE: SIGNIFICANT CHANGE UP

## 2017-12-22 ENCOUNTER — APPOINTMENT (OUTPATIENT)
Dept: PHYSICAL MEDICINE AND REHAB | Facility: CLINIC | Age: 67
End: 2017-12-22
Payer: MEDICARE

## 2017-12-22 VITALS
DIASTOLIC BLOOD PRESSURE: 53 MMHG | BODY MASS INDEX: 15.98 KG/M2 | SYSTOLIC BLOOD PRESSURE: 90 MMHG | WEIGHT: 102 LBS | HEART RATE: 87 BPM | OXYGEN SATURATION: 97 % | TEMPERATURE: 97.4 F

## 2017-12-22 DIAGNOSIS — N31.9 NEUROMUSCULAR DYSFUNCTION OF BLADDER, UNSPECIFIED: ICD-10-CM

## 2017-12-22 DIAGNOSIS — G54.0 BRACHIAL PLEXUS DISORDERS: ICD-10-CM

## 2017-12-22 DIAGNOSIS — G93.9 DISORDER OF BRAIN, UNSPECIFIED: ICD-10-CM

## 2017-12-22 PROCEDURE — 99214 OFFICE O/P EST MOD 30 MIN: CPT

## 2017-12-22 RX ORDER — LUBIPROSTONE 24 UG/1
24 CAPSULE, GELATIN COATED ORAL TWICE DAILY
Qty: 60 | Refills: 5 | Status: DISCONTINUED | COMMUNITY
Start: 2017-06-14 | End: 2017-12-22

## 2017-12-25 ENCOUNTER — EMERGENCY (EMERGENCY)
Facility: HOSPITAL | Age: 67
LOS: 0 days | Discharge: HOME | End: 2017-12-25
Attending: EMERGENCY MEDICINE | Admitting: EMERGENCY MEDICINE
Payer: MEDICARE

## 2017-12-25 VITALS
HEART RATE: 82 BPM | SYSTOLIC BLOOD PRESSURE: 175 MMHG | OXYGEN SATURATION: 100 % | RESPIRATION RATE: 17 BRPM | DIASTOLIC BLOOD PRESSURE: 101 MMHG | WEIGHT: 139.99 LBS | HEIGHT: 70 IN | TEMPERATURE: 98 F

## 2017-12-25 DIAGNOSIS — R53.1 WEAKNESS: ICD-10-CM

## 2017-12-25 DIAGNOSIS — R42 DIZZINESS AND GIDDINESS: ICD-10-CM

## 2017-12-25 DIAGNOSIS — Z98.89 OTHER SPECIFIED POSTPROCEDURAL STATES: Chronic | ICD-10-CM

## 2017-12-25 DIAGNOSIS — Z86.73 PERSONAL HISTORY OF TRANSIENT ISCHEMIC ATTACK (TIA), AND CEREBRAL INFARCTION WITHOUT RESIDUAL DEFICITS: ICD-10-CM

## 2017-12-25 DIAGNOSIS — Z86.79 PERSONAL HISTORY OF OTHER DISEASES OF THE CIRCULATORY SYSTEM: ICD-10-CM

## 2017-12-25 DIAGNOSIS — I10 ESSENTIAL (PRIMARY) HYPERTENSION: ICD-10-CM

## 2017-12-25 DIAGNOSIS — H54.7 UNSPECIFIED VISUAL LOSS: ICD-10-CM

## 2017-12-25 DIAGNOSIS — G82.20 PARAPLEGIA, UNSPECIFIED: ICD-10-CM

## 2017-12-25 LAB
ADD ON TEST-SPECIMEN IN LAB: SIGNIFICANT CHANGE UP
ALBUMIN SERPL ELPH-MCNC: 3.5 G/DL — SIGNIFICANT CHANGE UP (ref 3.3–5)
ALP SERPL-CCNC: 109 U/L — SIGNIFICANT CHANGE UP (ref 40–120)
ALT FLD-CCNC: 24 U/L — SIGNIFICANT CHANGE UP (ref 12–78)
ANION GAP SERPL CALC-SCNC: 7 MMOL/L — SIGNIFICANT CHANGE UP (ref 5–17)
APPEARANCE UR: CLEAR — SIGNIFICANT CHANGE UP
AST SERPL-CCNC: 31 U/L — SIGNIFICANT CHANGE UP (ref 15–37)
BACTERIA # UR AUTO: (no result)
BASOPHILS # BLD AUTO: 0 K/UL — SIGNIFICANT CHANGE UP (ref 0–0.2)
BASOPHILS NFR BLD AUTO: 0.4 % — SIGNIFICANT CHANGE UP (ref 0–2)
BILIRUB SERPL-MCNC: 0.3 MG/DL — SIGNIFICANT CHANGE UP (ref 0.2–1.2)
BILIRUB UR-MCNC: NEGATIVE — SIGNIFICANT CHANGE UP
BUN SERPL-MCNC: 15 MG/DL — SIGNIFICANT CHANGE UP (ref 7–23)
CALCIUM SERPL-MCNC: 9.3 MG/DL — SIGNIFICANT CHANGE UP (ref 8.5–10.1)
CHLORIDE SERPL-SCNC: 104 MMOL/L — SIGNIFICANT CHANGE UP (ref 96–108)
CO2 SERPL-SCNC: 29 MMOL/L — SIGNIFICANT CHANGE UP (ref 22–31)
COLOR SPEC: YELLOW — SIGNIFICANT CHANGE UP
COMMENT - URINE: SIGNIFICANT CHANGE UP
CREAT SERPL-MCNC: 0.48 MG/DL — LOW (ref 0.5–1.3)
CULTURE RESULTS: SIGNIFICANT CHANGE UP
CULTURE RESULTS: SIGNIFICANT CHANGE UP
DIFF PNL FLD: (no result)
EOSINOPHIL # BLD AUTO: 0.1 K/UL — SIGNIFICANT CHANGE UP (ref 0–0.5)
EOSINOPHIL NFR BLD AUTO: 1.5 % — SIGNIFICANT CHANGE UP (ref 0–6)
EPI CELLS # UR: SIGNIFICANT CHANGE UP
GLUCOSE SERPL-MCNC: 98 MG/DL — SIGNIFICANT CHANGE UP (ref 70–99)
GLUCOSE UR QL: NEGATIVE MG/DL — SIGNIFICANT CHANGE UP
HCT VFR BLD CALC: 41 % — SIGNIFICANT CHANGE UP (ref 34.5–45)
HGB BLD-MCNC: 12.8 G/DL — SIGNIFICANT CHANGE UP (ref 11.5–15.5)
KETONES UR-MCNC: NEGATIVE — SIGNIFICANT CHANGE UP
LEUKOCYTE ESTERASE UR-ACNC: (no result)
LYMPHOCYTES # BLD AUTO: 0.8 K/UL — LOW (ref 1–3.3)
LYMPHOCYTES # BLD AUTO: 9.1 % — LOW (ref 13–44)
MCHC RBC-ENTMCNC: 26.9 PG — LOW (ref 27–34)
MCHC RBC-ENTMCNC: 31.3 GM/DL — LOW (ref 32–36)
MCV RBC AUTO: 85.8 FL — SIGNIFICANT CHANGE UP (ref 80–100)
MONOCYTES # BLD AUTO: 0.8 K/UL — SIGNIFICANT CHANGE UP (ref 0–0.9)
MONOCYTES NFR BLD AUTO: 9 % — SIGNIFICANT CHANGE UP (ref 2–14)
NEUTROPHILS # BLD AUTO: 7.1 K/UL — SIGNIFICANT CHANGE UP (ref 1.8–7.4)
NEUTROPHILS NFR BLD AUTO: 79.9 % — HIGH (ref 43–77)
NITRITE UR-MCNC: NEGATIVE — SIGNIFICANT CHANGE UP
PH UR: 6.5 — SIGNIFICANT CHANGE UP (ref 5–8)
PLATELET # BLD AUTO: 253 K/UL — SIGNIFICANT CHANGE UP (ref 150–400)
POTASSIUM SERPL-MCNC: 5.3 MMOL/L — SIGNIFICANT CHANGE UP (ref 3.5–5.3)
POTASSIUM SERPL-SCNC: 5.3 MMOL/L — SIGNIFICANT CHANGE UP (ref 3.5–5.3)
PROT SERPL-MCNC: 7.3 GM/DL — SIGNIFICANT CHANGE UP (ref 6–8.3)
PROT UR-MCNC: 15 MG/DL
RBC # BLD: 4.78 M/UL — SIGNIFICANT CHANGE UP (ref 3.8–5.2)
RBC # FLD: 15.7 % — HIGH (ref 10.3–14.5)
RBC CASTS # UR COMP ASSIST: (no result) /HPF (ref 0–4)
SODIUM SERPL-SCNC: 140 MMOL/L — SIGNIFICANT CHANGE UP (ref 135–145)
SP GR SPEC: 1.01 — SIGNIFICANT CHANGE UP (ref 1.01–1.02)
SPECIMEN SOURCE: SIGNIFICANT CHANGE UP
SPECIMEN SOURCE: SIGNIFICANT CHANGE UP
TROPONIN I SERPL-MCNC: <0.015 NG/ML — SIGNIFICANT CHANGE UP (ref 0.01–0.04)
TSH SERPL-MCNC: 0.39 UU/ML — SIGNIFICANT CHANGE UP (ref 0.36–3.74)
UROBILINOGEN FLD QL: NEGATIVE MG/DL — SIGNIFICANT CHANGE UP
WBC # BLD: 8.9 K/UL — SIGNIFICANT CHANGE UP (ref 3.8–10.5)
WBC # FLD AUTO: 8.9 K/UL — SIGNIFICANT CHANGE UP (ref 3.8–10.5)
WBC UR QL: (no result)

## 2017-12-25 PROCEDURE — 99285 EMERGENCY DEPT VISIT HI MDM: CPT

## 2017-12-25 PROCEDURE — 71010: CPT | Mod: 26

## 2017-12-25 PROCEDURE — 93010 ELECTROCARDIOGRAM REPORT: CPT

## 2017-12-25 PROCEDURE — 70450 CT HEAD/BRAIN W/O DYE: CPT | Mod: 26

## 2017-12-25 RX ORDER — ACETAMINOPHEN 500 MG
1000 TABLET ORAL ONCE
Qty: 0 | Refills: 0 | Status: COMPLETED | OUTPATIENT
Start: 2017-12-25 | End: 2017-12-25

## 2017-12-25 RX ORDER — LABETALOL HCL 100 MG
200 TABLET ORAL ONCE
Qty: 0 | Refills: 0 | Status: COMPLETED | OUTPATIENT
Start: 2017-12-25 | End: 2017-12-25

## 2017-12-25 RX ADMIN — Medication 200 MILLIGRAM(S): at 18:13

## 2017-12-25 RX ADMIN — Medication 1000 MILLIGRAM(S): at 17:46

## 2017-12-25 NOTE — ED PROVIDER NOTE - PMH
Aortic dissection, abdominal  Type B Watched regularly  Aortic dissection, thoracic  Type A Repaired  Blindness of left eye    Chronic constipation    HTN (Hypertension)    Paraplegia    TIA (transient ischemic attack)    UTI (urinary tract infection)

## 2017-12-25 NOTE — ED ADULT TRIAGE NOTE - CHIEF COMPLAINT QUOTE
pt states " I passed out this morning on the toilet" pt denies trauma. Pt has flat affect and is falling asleep at triage. Pt states " I feel very dizzy"

## 2017-12-25 NOTE — ED PROVIDER NOTE - OBJECTIVE STATEMENT
65 y/o female with a PMHx of spinal bleed spontaneous left her paralyzed 3 years ago, HTN, aortic tear/dissection, aorta replaced in September presents to the ED c/o many complaints. Pt new onset of leaning forward, new onset of wobbling of the head, left arm weakness now getting better. Pt went to the hospital for UTI on Wed or Thurs. Did CT at [facility]: CT: impression Subtle lucency within the left rafaela which may represent an infarct of indeterminate age. Brain MRI follow up can be obtained for further evaluation. Every since Thursday pt has not been herself.  Palsy in weakness left arm slowly getting better, drifting/wobbly to the left side. Per sister: Early this morning pt sitting on wheelchair, felt dizziness when transferring to toilet, back to bed felt tired the whole day red, no control of her trunk. Pt complains of dizziness this morning. When looking at her, "eyes felt like moving back and forth" and "my brain is not right" +diarrhea. Pt currently on ABx for UTI. Pt is very aware of her body.  Denies sweats chills, neck pain . Non-smoker or alochol use. 65 y/o female with a PMHx of spinal bleed spontaneous s/p paralysis 3 years ago, HTN, aortic tear/dissection, aorta replaced in September presents to the ED c/o many complaints. Pt new onset of leaning forward, new onset of wobbling of the head, left arm weakness now getting better. Pt went to the hospital for UTI on Wed or Thurs. " CT at [facility]: CT: impression Subtle lucency within the left rafaela which may represent an infarct of indeterminate age. Brain MRI follow up can be obtained for further evaluation." Every since Thursday pt has not been herself, fell asleep on her left arm, palsy in weakness left arm slowly getting better, drifting/wobbly to the left side. Per sister: Early this morning pt sitting on wheelchair, felt dizziness when transferring to toilet, vision change, back to bed felt tired the whole day red, no control of her trunk. Per pt "eyes felt like moving back and forth" and "my brain is not right" +diarrhea. Pt currently on ABx for UTI. + Bed sore located in the back.  Denies sweats chills, neck pain. Non-smoker or alcohol use. Methadone 5mg 4 times a day, when needed 10mg Oxycodone. Sister and  at bedside.

## 2017-12-25 NOTE — ED PROVIDER NOTE - PROGRESS NOTE DETAILS
CT: impression Subtle lucency within the left rafaela which may represent an infarct of indeterminate age. Brain MRI follow up can be obtained for further evaluation. Spoke to  agrees with plan thus far. Last CT read in [place]: impression Subtle lucency within the left rafaela which may represent an infarct of indeterminate age. Brain MRI follow up can be obtained for further evaluation. I Tony  attest that this documentation has been prepared under the direction and in the presence of Doctor James. ED attending, Dr. James, updated pt about the results of the CAT scan and is paging neurology. ED attending, Dr. James, spoke with family and informed them about no stroke. Dr. James gave family permission to give pt her medications that she missed today. Pt is stable. ED attending, Dr. Guerrero, discussed all risks and benefits with family and they are agreeable to send pt home.

## 2017-12-26 ENCOUNTER — APPOINTMENT (OUTPATIENT)
Dept: PHYSICAL MEDICINE AND REHAB | Facility: CLINIC | Age: 67
End: 2017-12-26

## 2017-12-27 ENCOUNTER — TRANSCRIPTION ENCOUNTER (OUTPATIENT)
Age: 67
End: 2017-12-27

## 2017-12-27 ENCOUNTER — OUTPATIENT (OUTPATIENT)
Dept: OUTPATIENT SERVICES | Facility: HOSPITAL | Age: 67
LOS: 1 days | Discharge: HOME | End: 2017-12-27
Payer: MEDICARE

## 2017-12-27 DIAGNOSIS — L92.9 GRANULOMATOUS DISORDER OF THE SKIN AND SUBCUTANEOUS TISSUE, UNSPECIFIED: ICD-10-CM

## 2017-12-27 DIAGNOSIS — Z98.89 OTHER SPECIFIED POSTPROCEDURAL STATES: Chronic | ICD-10-CM

## 2017-12-27 LAB
-  AMPICILLIN: SIGNIFICANT CHANGE UP
-  CIPROFLOXACIN: SIGNIFICANT CHANGE UP
-  NITROFURANTOIN: SIGNIFICANT CHANGE UP
-  TETRACYCLINE: SIGNIFICANT CHANGE UP
-  VANCOMYCIN: SIGNIFICANT CHANGE UP
CULTURE RESULTS: SIGNIFICANT CHANGE UP
METHOD TYPE: SIGNIFICANT CHANGE UP
ORGANISM # SPEC MICROSCOPIC CNT: SIGNIFICANT CHANGE UP
ORGANISM # SPEC MICROSCOPIC CNT: SIGNIFICANT CHANGE UP
SPECIMEN SOURCE: SIGNIFICANT CHANGE UP

## 2017-12-27 PROCEDURE — G0463: CPT

## 2017-12-28 ENCOUNTER — RX RENEWAL (OUTPATIENT)
Age: 67
End: 2017-12-28

## 2017-12-28 DIAGNOSIS — Z98.890 OTHER SPECIFIED POSTPROCEDURAL STATES: ICD-10-CM

## 2017-12-28 DIAGNOSIS — Z87.891 PERSONAL HISTORY OF NICOTINE DEPENDENCE: ICD-10-CM

## 2017-12-28 DIAGNOSIS — L89.153 PRESSURE ULCER OF SACRAL REGION, STAGE 3: ICD-10-CM

## 2017-12-28 DIAGNOSIS — R32 UNSPECIFIED URINARY INCONTINENCE: ICD-10-CM

## 2017-12-28 DIAGNOSIS — G83.9 PARALYTIC SYNDROME, UNSPECIFIED: ICD-10-CM

## 2017-12-28 DIAGNOSIS — Z88.8 ALLERGY STATUS TO OTHER DRUGS, MEDICAMENTS AND BIOLOGICAL SUBSTANCES STATUS: ICD-10-CM

## 2017-12-28 DIAGNOSIS — R63.6 UNDERWEIGHT: ICD-10-CM

## 2017-12-28 DIAGNOSIS — Z79.899 OTHER LONG TERM (CURRENT) DRUG THERAPY: ICD-10-CM

## 2017-12-28 RX ORDER — DOCUSATE SODIUM AND BENZOCAINE 283; 20 MG/5ML; MG/5ML
20-283 LIQUID RECTAL DAILY
Qty: 30 | Refills: 5 | Status: ACTIVE | COMMUNITY
Start: 2017-12-01 | End: 1900-01-01

## 2017-12-29 RX ORDER — NITROFURANTOIN MACROCRYSTAL 50 MG
1 CAPSULE ORAL
Qty: 20 | Refills: 0
Start: 2017-12-29 | End: 2018-01-07

## 2017-12-29 NOTE — ED POST DISCHARGE NOTE - RESULT SUMMARY
+UC. Pt feeling better but still slightly weak which she is following up with a neurologist. Bacteria sensitive to macrobid. Told pt that abx will be switched from cefpodoxime. Pt and  is aware. -Familia Vallecillo PA-C

## 2018-01-01 ENCOUNTER — OUTPATIENT (OUTPATIENT)
Dept: OUTPATIENT SERVICES | Facility: HOSPITAL | Age: 68
LOS: 1 days | End: 2018-01-01
Payer: MEDICARE

## 2018-01-01 ENCOUNTER — APPOINTMENT (OUTPATIENT)
Dept: NEUROSURGERY | Facility: HOSPITAL | Age: 68
End: 2018-01-01

## 2018-01-01 ENCOUNTER — RX RENEWAL (OUTPATIENT)
Age: 68
End: 2018-01-01

## 2018-01-01 ENCOUNTER — TRANSCRIPTION ENCOUNTER (OUTPATIENT)
Age: 68
End: 2018-01-01

## 2018-01-01 ENCOUNTER — APPOINTMENT (OUTPATIENT)
Dept: NEUROSURGERY | Facility: CLINIC | Age: 68
End: 2018-01-01
Payer: MEDICARE

## 2018-01-01 VITALS — DIASTOLIC BLOOD PRESSURE: 63 MMHG | HEART RATE: 77 BPM | SYSTOLIC BLOOD PRESSURE: 100 MMHG

## 2018-01-01 VITALS
HEIGHT: 67 IN | SYSTOLIC BLOOD PRESSURE: 122 MMHG | HEART RATE: 87 BPM | DIASTOLIC BLOOD PRESSURE: 71 MMHG | OXYGEN SATURATION: 100 % | WEIGHT: 106.04 LBS | TEMPERATURE: 99 F | RESPIRATION RATE: 18 BRPM

## 2018-01-01 VITALS
HEART RATE: 71 BPM | TEMPERATURE: 97 F | SYSTOLIC BLOOD PRESSURE: 140 MMHG | RESPIRATION RATE: 16 BRPM | WEIGHT: 106.04 LBS | OXYGEN SATURATION: 98 % | DIASTOLIC BLOOD PRESSURE: 84 MMHG | HEIGHT: 67 IN

## 2018-01-01 DIAGNOSIS — M53.9 DORSOPATHY, UNSPECIFIED: Chronic | ICD-10-CM

## 2018-01-01 DIAGNOSIS — Z94.7 CORNEAL TRANSPLANT STATUS: Chronic | ICD-10-CM

## 2018-01-01 DIAGNOSIS — Z98.89 OTHER SPECIFIED POSTPROCEDURAL STATES: Chronic | ICD-10-CM

## 2018-01-01 DIAGNOSIS — I10 ESSENTIAL (PRIMARY) HYPERTENSION: ICD-10-CM

## 2018-01-01 DIAGNOSIS — Z95.828 PRESENCE OF OTHER VASCULAR IMPLANTS AND GRAFTS: Chronic | ICD-10-CM

## 2018-01-01 DIAGNOSIS — M54.5 LOW BACK PAIN: ICD-10-CM

## 2018-01-01 DIAGNOSIS — M54.9 DORSALGIA, UNSPECIFIED: ICD-10-CM

## 2018-01-01 DIAGNOSIS — G82.20 PARAPLEGIA, UNSPECIFIED: ICD-10-CM

## 2018-01-01 DIAGNOSIS — Z01.818 ENCOUNTER FOR OTHER PREPROCEDURAL EXAMINATION: ICD-10-CM

## 2018-01-01 LAB
ANION GAP SERPL CALC-SCNC: 11 MMOL/L — SIGNIFICANT CHANGE UP (ref 5–17)
ANION GAP SERPL CALC-SCNC: 12 MMOL/L — SIGNIFICANT CHANGE UP (ref 5–17)
BASOPHILS # BLD AUTO: 0 K/UL — SIGNIFICANT CHANGE UP (ref 0–0.2)
BASOPHILS NFR BLD AUTO: 0.3 % — SIGNIFICANT CHANGE UP (ref 0–2)
BUN SERPL-MCNC: 18 MG/DL — SIGNIFICANT CHANGE UP (ref 7–23)
BUN SERPL-MCNC: 20 MG/DL — SIGNIFICANT CHANGE UP (ref 7–23)
CALCIUM SERPL-MCNC: 9.1 MG/DL — SIGNIFICANT CHANGE UP (ref 8.4–10.5)
CALCIUM SERPL-MCNC: 9.2 MG/DL — SIGNIFICANT CHANGE UP (ref 8.4–10.5)
CHLORIDE SERPL-SCNC: 103 MMOL/L — SIGNIFICANT CHANGE UP (ref 96–108)
CHLORIDE SERPL-SCNC: 104 MMOL/L — SIGNIFICANT CHANGE UP (ref 96–108)
CO2 SERPL-SCNC: 25 MMOL/L — SIGNIFICANT CHANGE UP (ref 22–31)
CO2 SERPL-SCNC: 26 MMOL/L — SIGNIFICANT CHANGE UP (ref 22–31)
CREAT SERPL-MCNC: 0.33 MG/DL — LOW (ref 0.5–1.3)
CREAT SERPL-MCNC: 0.49 MG/DL — LOW (ref 0.5–1.3)
EOSINOPHIL # BLD AUTO: 0.2 K/UL — SIGNIFICANT CHANGE UP (ref 0–0.5)
EOSINOPHIL NFR BLD AUTO: 2.5 % — SIGNIFICANT CHANGE UP (ref 0–6)
GLUCOSE SERPL-MCNC: 100 MG/DL — HIGH (ref 70–99)
GLUCOSE SERPL-MCNC: 98 MG/DL — SIGNIFICANT CHANGE UP (ref 70–99)
HCT VFR BLD CALC: 35.7 % — SIGNIFICANT CHANGE UP (ref 34.5–45)
HCT VFR BLD CALC: 36.3 % — SIGNIFICANT CHANGE UP (ref 34.5–45)
HGB BLD-MCNC: 10.9 G/DL — LOW (ref 11.5–15.5)
HGB BLD-MCNC: 11.7 G/DL — SIGNIFICANT CHANGE UP (ref 11.5–15.5)
LYMPHOCYTES # BLD AUTO: 1.2 K/UL — SIGNIFICANT CHANGE UP (ref 1–3.3)
LYMPHOCYTES # BLD AUTO: 18.4 % — SIGNIFICANT CHANGE UP (ref 13–44)
MCHC RBC-ENTMCNC: 24 PG — LOW (ref 27–34)
MCHC RBC-ENTMCNC: 24.7 PG — LOW (ref 27–34)
MCHC RBC-ENTMCNC: 30.5 GM/DL — LOW (ref 32–36)
MCHC RBC-ENTMCNC: 32.1 GM/DL — SIGNIFICANT CHANGE UP (ref 32–36)
MCV RBC AUTO: 76.9 FL — LOW (ref 80–100)
MCV RBC AUTO: 78.6 FL — LOW (ref 80–100)
MONOCYTES # BLD AUTO: 0.6 K/UL — SIGNIFICANT CHANGE UP (ref 0–0.9)
MONOCYTES NFR BLD AUTO: 9.4 % — SIGNIFICANT CHANGE UP (ref 2–14)
MRSA PCR RESULT.: SIGNIFICANT CHANGE UP
NEUTROPHILS # BLD AUTO: 4.7 K/UL — SIGNIFICANT CHANGE UP (ref 1.8–7.4)
NEUTROPHILS NFR BLD AUTO: 69.4 % — SIGNIFICANT CHANGE UP (ref 43–77)
PLATELET # BLD AUTO: 200 K/UL — SIGNIFICANT CHANGE UP (ref 150–400)
PLATELET # BLD AUTO: 221 K/UL — SIGNIFICANT CHANGE UP (ref 150–400)
POTASSIUM SERPL-MCNC: 4 MMOL/L — SIGNIFICANT CHANGE UP (ref 3.5–5.3)
POTASSIUM SERPL-MCNC: 4.6 MMOL/L — SIGNIFICANT CHANGE UP (ref 3.5–5.3)
POTASSIUM SERPL-SCNC: 4 MMOL/L — SIGNIFICANT CHANGE UP (ref 3.5–5.3)
POTASSIUM SERPL-SCNC: 4.6 MMOL/L — SIGNIFICANT CHANGE UP (ref 3.5–5.3)
RBC # BLD: 4.54 M/UL — SIGNIFICANT CHANGE UP (ref 3.8–5.2)
RBC # BLD: 4.72 M/UL — SIGNIFICANT CHANGE UP (ref 3.8–5.2)
RBC # FLD: 15.2 % — HIGH (ref 10.3–14.5)
RBC # FLD: 15.8 % — HIGH (ref 10.3–14.5)
S AUREUS DNA NOSE QL NAA+PROBE: SIGNIFICANT CHANGE UP
SODIUM SERPL-SCNC: 139 MMOL/L — SIGNIFICANT CHANGE UP (ref 135–145)
SODIUM SERPL-SCNC: 142 MMOL/L — SIGNIFICANT CHANGE UP (ref 135–145)
WBC # BLD: 6.44 K/UL — SIGNIFICANT CHANGE UP (ref 3.8–10.5)
WBC # BLD: 6.8 K/UL — SIGNIFICANT CHANGE UP (ref 3.8–10.5)
WBC # FLD AUTO: 6.44 K/UL — SIGNIFICANT CHANGE UP (ref 3.8–10.5)
WBC # FLD AUTO: 6.8 K/UL — SIGNIFICANT CHANGE UP (ref 3.8–10.5)

## 2018-01-01 PROCEDURE — 99215 OFFICE O/P EST HI 40 MIN: CPT | Mod: 57

## 2018-01-01 PROCEDURE — 80048 BASIC METABOLIC PNL TOTAL CA: CPT

## 2018-01-01 PROCEDURE — G0463: CPT

## 2018-01-01 PROCEDURE — 87640 STAPH A DNA AMP PROBE: CPT

## 2018-01-01 PROCEDURE — 87641 MR-STAPH DNA AMP PROBE: CPT

## 2018-01-01 PROCEDURE — 85027 COMPLETE CBC AUTOMATED: CPT

## 2018-01-01 PROCEDURE — 63655 IMPLANT NEUROELECTRODES: CPT

## 2018-01-01 RX ORDER — DOCUSATE SODIUM 100 MG
100 CAPSULE ORAL THREE TIMES A DAY
Qty: 0 | Refills: 0 | Status: DISCONTINUED | OUTPATIENT
Start: 2018-01-01 | End: 2019-01-01

## 2018-01-01 RX ORDER — ONDANSETRON 8 MG/1
4 TABLET, FILM COATED ORAL EVERY 4 HOURS
Qty: 0 | Refills: 0 | Status: DISCONTINUED | OUTPATIENT
Start: 2018-01-01 | End: 2019-01-01

## 2018-01-01 RX ORDER — GABAPENTIN 400 MG/1
300 CAPSULE ORAL THREE TIMES A DAY
Qty: 0 | Refills: 0 | Status: DISCONTINUED | OUTPATIENT
Start: 2018-01-01 | End: 2019-01-01

## 2018-01-01 RX ORDER — METHADONE HYDROCHLORIDE 5 MG/1
5 TABLET ORAL TWICE DAILY
Qty: 60 | Refills: 0 | Status: ACTIVE | COMMUNITY
Start: 2018-10-04 | End: 1900-01-01

## 2018-01-01 RX ORDER — LEVORPHANOL TARTRATE 3 MG/1
2 TABLET ORAL THREE TIMES A DAY
Qty: 0 | Refills: 0 | Status: DISCONTINUED | OUTPATIENT
Start: 2018-01-01 | End: 2018-01-01

## 2018-01-01 RX ORDER — OXYCODONE HYDROCHLORIDE 5 MG/1
10 TABLET ORAL EVERY 4 HOURS
Qty: 0 | Refills: 0 | Status: DISCONTINUED | OUTPATIENT
Start: 2018-01-01 | End: 2019-01-01

## 2018-01-01 RX ORDER — OXYCODONE HYDROCHLORIDE 5 MG/1
5 TABLET ORAL EVERY 4 HOURS
Qty: 0 | Refills: 0 | Status: DISCONTINUED | OUTPATIENT
Start: 2018-01-01 | End: 2018-01-01

## 2018-01-01 RX ORDER — SENNA PLUS 8.6 MG/1
2 TABLET ORAL AT BEDTIME
Qty: 0 | Refills: 0 | Status: DISCONTINUED | OUTPATIENT
Start: 2018-01-01 | End: 2019-01-01

## 2018-01-01 RX ORDER — CEFAZOLIN SODIUM 1 G
2000 VIAL (EA) INJECTION EVERY 8 HOURS
Qty: 0 | Refills: 0 | Status: COMPLETED | OUTPATIENT
Start: 2018-01-01 | End: 2019-01-01

## 2018-01-01 RX ORDER — ONDANSETRON 8 MG/1
4 TABLET, FILM COATED ORAL ONCE
Qty: 0 | Refills: 0 | Status: DISCONTINUED | OUTPATIENT
Start: 2018-01-01 | End: 2019-01-01

## 2018-01-01 RX ORDER — LABETALOL HCL 100 MG
400 TABLET ORAL THREE TIMES A DAY
Qty: 0 | Refills: 0 | Status: DISCONTINUED | OUTPATIENT
Start: 2018-01-01 | End: 2019-01-01

## 2018-01-01 RX ORDER — PREDNISOLONE SODIUM PHOSPHATE 1 %
1 DROPS OPHTHALMIC (EYE)
Qty: 0 | Refills: 0 | Status: DISCONTINUED | OUTPATIENT
Start: 2018-01-01 | End: 2019-01-01

## 2018-01-01 RX ORDER — ACETAMINOPHEN 500 MG
650 TABLET ORAL EVERY 6 HOURS
Qty: 0 | Refills: 0 | Status: DISCONTINUED | OUTPATIENT
Start: 2018-01-01 | End: 2019-01-01

## 2018-01-01 RX ORDER — DULOXETINE HYDROCHLORIDE 30 MG/1
1 CAPSULE, DELAYED RELEASE ORAL
Qty: 0 | Refills: 0 | COMMUNITY

## 2018-01-01 RX ORDER — OXYCODONE 5 MG/1
5 TABLET ORAL
Qty: 120 | Refills: 0 | Status: ACTIVE | COMMUNITY
Start: 2018-10-17 | End: 1900-01-01

## 2018-01-01 RX ORDER — ZOLPIDEM TARTRATE 10 MG/1
5 TABLET ORAL AT BEDTIME
Qty: 0 | Refills: 0 | Status: DISCONTINUED | OUTPATIENT
Start: 2018-01-01 | End: 2018-01-01

## 2018-01-01 RX ORDER — ZOLPIDEM TARTRATE 10 MG/1
5 TABLET ORAL AT BEDTIME
Qty: 0 | Refills: 0 | Status: DISCONTINUED | OUTPATIENT
Start: 2018-01-01 | End: 2019-01-01

## 2018-01-01 RX ORDER — CEFAZOLIN SODIUM 1 G
2000 VIAL (EA) INJECTION ONCE
Qty: 0 | Refills: 0 | Status: DISCONTINUED | OUTPATIENT
Start: 2018-01-01 | End: 2019-01-01

## 2018-01-01 RX ORDER — FENTANYL CITRATE 50 UG/ML
50 INJECTION INTRAVENOUS ONCE
Qty: 0 | Refills: 0 | Status: DISCONTINUED | OUTPATIENT
Start: 2018-01-01 | End: 2019-01-01

## 2018-01-01 RX ORDER — FENTANYL CITRATE 50 UG/ML
25 INJECTION INTRAVENOUS
Qty: 0 | Refills: 0 | Status: DISCONTINUED | OUTPATIENT
Start: 2018-01-01 | End: 2019-01-01

## 2018-01-01 RX ORDER — ACETAMINOPHEN 500 MG
750 TABLET ORAL ONCE
Qty: 0 | Refills: 0 | Status: COMPLETED | OUTPATIENT
Start: 2018-01-01 | End: 2018-01-01

## 2018-01-01 RX ORDER — DEXTROSE MONOHYDRATE, SODIUM CHLORIDE, AND POTASSIUM CHLORIDE 50; .745; 4.5 G/1000ML; G/1000ML; G/1000ML
1000 INJECTION, SOLUTION INTRAVENOUS
Qty: 0 | Refills: 0 | Status: DISCONTINUED | OUTPATIENT
Start: 2018-01-01 | End: 2019-01-01

## 2018-01-01 RX ORDER — SODIUM CHLORIDE 9 MG/ML
3 INJECTION INTRAMUSCULAR; INTRAVENOUS; SUBCUTANEOUS EVERY 8 HOURS
Qty: 0 | Refills: 0 | Status: DISCONTINUED | OUTPATIENT
Start: 2018-01-01 | End: 2018-01-01

## 2018-01-01 RX ORDER — LIDOCAINE HCL 20 MG/ML
0.2 VIAL (ML) INJECTION ONCE
Qty: 0 | Refills: 0 | Status: DISCONTINUED | OUTPATIENT
Start: 2018-01-01 | End: 2018-01-01

## 2018-01-01 RX ORDER — DULOXETINE HYDROCHLORIDE 30 MG/1
20 CAPSULE, DELAYED RELEASE ORAL
Qty: 0 | Refills: 0 | Status: DISCONTINUED | OUTPATIENT
Start: 2018-01-01 | End: 2019-01-01

## 2018-01-01 RX ORDER — BACLOFEN 100 %
20 POWDER (GRAM) MISCELLANEOUS THREE TIMES A DAY
Qty: 0 | Refills: 0 | Status: DISCONTINUED | OUTPATIENT
Start: 2018-01-01 | End: 2019-01-01

## 2018-01-01 RX ADMIN — Medication 400 MILLIGRAM(S): at 22:21

## 2018-01-01 RX ADMIN — FENTANYL CITRATE 25 MICROGRAM(S): 50 INJECTION INTRAVENOUS at 14:59

## 2018-01-01 RX ADMIN — OXYCODONE HYDROCHLORIDE 10 MILLIGRAM(S): 5 TABLET ORAL at 17:11

## 2018-01-01 RX ADMIN — Medication 100 MILLIGRAM(S): at 17:55

## 2018-01-01 RX ADMIN — Medication 2 TABLET(S): at 17:56

## 2018-01-01 RX ADMIN — Medication 100 MILLIGRAM(S): at 14:54

## 2018-01-01 RX ADMIN — GABAPENTIN 300 MILLIGRAM(S): 400 CAPSULE ORAL at 14:54

## 2018-01-01 RX ADMIN — Medication 400 MILLIGRAM(S): at 14:55

## 2018-01-01 RX ADMIN — OXYCODONE HYDROCHLORIDE 10 MILLIGRAM(S): 5 TABLET ORAL at 23:20

## 2018-01-01 RX ADMIN — Medication 300 MILLIGRAM(S): at 16:41

## 2018-01-01 RX ADMIN — Medication 1 DROP(S): at 22:00

## 2018-01-01 RX ADMIN — DULOXETINE HYDROCHLORIDE 20 MILLIGRAM(S): 30 CAPSULE, DELAYED RELEASE ORAL at 17:56

## 2018-01-01 RX ADMIN — OXYCODONE HYDROCHLORIDE 10 MILLIGRAM(S): 5 TABLET ORAL at 16:41

## 2018-01-01 RX ADMIN — Medication 20 MILLIGRAM(S): at 22:21

## 2018-01-01 RX ADMIN — Medication 20 MILLIGRAM(S): at 14:56

## 2018-01-01 RX ADMIN — GABAPENTIN 300 MILLIGRAM(S): 400 CAPSULE ORAL at 22:40

## 2018-01-01 RX ADMIN — FENTANYL CITRATE 25 MICROGRAM(S): 50 INJECTION INTRAVENOUS at 17:19

## 2018-01-01 RX ADMIN — Medication 750 MILLIGRAM(S): at 17:15

## 2018-01-01 RX ADMIN — Medication 1 DROP(S): at 15:02

## 2018-01-03 ENCOUNTER — APPOINTMENT (OUTPATIENT)
Dept: PHYSICAL MEDICINE AND REHAB | Facility: CLINIC | Age: 68
End: 2018-01-03

## 2018-01-08 ENCOUNTER — EMERGENCY (EMERGENCY)
Facility: HOSPITAL | Age: 68
LOS: 1 days | Discharge: HOME | End: 2018-01-08
Attending: EMERGENCY MEDICINE | Admitting: EMERGENCY MEDICINE
Payer: MEDICARE

## 2018-01-08 ENCOUNTER — OUTPATIENT (OUTPATIENT)
Dept: OUTPATIENT SERVICES | Facility: HOSPITAL | Age: 68
LOS: 1 days | Discharge: ANOTHER HOSPITAL | End: 2018-01-08
Payer: MEDICARE

## 2018-01-08 VITALS
HEIGHT: 67 IN | OXYGEN SATURATION: 98 % | WEIGHT: 104.94 LBS | RESPIRATION RATE: 14 BRPM | TEMPERATURE: 98 F | SYSTOLIC BLOOD PRESSURE: 118 MMHG | HEART RATE: 119 BPM | DIASTOLIC BLOOD PRESSURE: 69 MMHG

## 2018-01-08 VITALS
TEMPERATURE: 98 F | DIASTOLIC BLOOD PRESSURE: 78 MMHG | RESPIRATION RATE: 15 BRPM | OXYGEN SATURATION: 94 % | HEART RATE: 128 BPM | SYSTOLIC BLOOD PRESSURE: 145 MMHG

## 2018-01-08 DIAGNOSIS — Z98.89 OTHER SPECIFIED POSTPROCEDURAL STATES: Chronic | ICD-10-CM

## 2018-01-08 DIAGNOSIS — L92.9 GRANULOMATOUS DISORDER OF THE SKIN AND SUBCUTANEOUS TISSUE, UNSPECIFIED: ICD-10-CM

## 2018-01-08 LAB
ALBUMIN SERPL ELPH-MCNC: 3.7 G/DL — SIGNIFICANT CHANGE UP (ref 3.3–5)
ALP SERPL-CCNC: 118 U/L — SIGNIFICANT CHANGE UP (ref 40–120)
ALT FLD-CCNC: 19 U/L — SIGNIFICANT CHANGE UP (ref 12–78)
ANION GAP SERPL CALC-SCNC: 9 MMOL/L — SIGNIFICANT CHANGE UP (ref 5–17)
APPEARANCE UR: ABNORMAL
APTT BLD: 34.9 SEC — SIGNIFICANT CHANGE UP (ref 27.5–37.4)
AST SERPL-CCNC: 24 U/L — SIGNIFICANT CHANGE UP (ref 15–37)
BASOPHILS # BLD AUTO: 0.1 K/UL — SIGNIFICANT CHANGE UP (ref 0–0.2)
BASOPHILS NFR BLD AUTO: 1.4 % — SIGNIFICANT CHANGE UP (ref 0–2)
BILIRUB SERPL-MCNC: 0.7 MG/DL — SIGNIFICANT CHANGE UP (ref 0.2–1.2)
BILIRUB UR-MCNC: NEGATIVE — SIGNIFICANT CHANGE UP
BUN SERPL-MCNC: 23 MG/DL — SIGNIFICANT CHANGE UP (ref 7–23)
CALCIUM SERPL-MCNC: 9.3 MG/DL — SIGNIFICANT CHANGE UP (ref 8.5–10.1)
CHLORIDE SERPL-SCNC: 102 MMOL/L — SIGNIFICANT CHANGE UP (ref 96–108)
CK MB BLD-MCNC: 4.5 % — HIGH (ref 0–3.5)
CK MB CFR SERPL CALC: 2.5 NG/ML — SIGNIFICANT CHANGE UP (ref 0–3.6)
CK SERPL-CCNC: 56 U/L — SIGNIFICANT CHANGE UP (ref 26–192)
CO2 SERPL-SCNC: 26 MMOL/L — SIGNIFICANT CHANGE UP (ref 22–31)
COLOR SPEC: YELLOW — SIGNIFICANT CHANGE UP
CREAT SERPL-MCNC: 0.51 MG/DL — SIGNIFICANT CHANGE UP (ref 0.5–1.3)
DIFF PNL FLD: ABNORMAL
EOSINOPHIL # BLD AUTO: 0.1 K/UL — SIGNIFICANT CHANGE UP (ref 0–0.5)
EOSINOPHIL NFR BLD AUTO: 1.9 % — SIGNIFICANT CHANGE UP (ref 0–6)
GLUCOSE SERPL-MCNC: 108 MG/DL — HIGH (ref 70–99)
GLUCOSE UR QL: NEGATIVE — SIGNIFICANT CHANGE UP
HCT VFR BLD CALC: 40.1 % — SIGNIFICANT CHANGE UP (ref 34.5–45)
HGB BLD-MCNC: 12.9 G/DL — SIGNIFICANT CHANGE UP (ref 11.5–15.5)
INR BLD: 1.04 RATIO — SIGNIFICANT CHANGE UP (ref 0.88–1.16)
KETONES UR-MCNC: NEGATIVE — SIGNIFICANT CHANGE UP
LEUKOCYTE ESTERASE UR-ACNC: ABNORMAL
LIDOCAIN IGE QN: 161 U/L — SIGNIFICANT CHANGE UP (ref 73–393)
LYMPHOCYTES # BLD AUTO: 1.4 K/UL — SIGNIFICANT CHANGE UP (ref 1–3.3)
LYMPHOCYTES # BLD AUTO: 21.7 % — SIGNIFICANT CHANGE UP (ref 13–44)
MAGNESIUM SERPL-MCNC: 2.5 MG/DL — SIGNIFICANT CHANGE UP (ref 1.6–2.6)
MCHC RBC-ENTMCNC: 28 PG — SIGNIFICANT CHANGE UP (ref 27–34)
MCHC RBC-ENTMCNC: 32.1 GM/DL — SIGNIFICANT CHANGE UP (ref 32–36)
MCV RBC AUTO: 87.1 FL — SIGNIFICANT CHANGE UP (ref 80–100)
MONOCYTES # BLD AUTO: 0.8 K/UL — SIGNIFICANT CHANGE UP (ref 0–0.9)
MONOCYTES NFR BLD AUTO: 12.6 % — HIGH (ref 1–9)
NEUTROPHILS # BLD AUTO: 4 K/UL — SIGNIFICANT CHANGE UP (ref 1.8–7.4)
NEUTROPHILS NFR BLD AUTO: 62.3 % — SIGNIFICANT CHANGE UP (ref 43–77)
NITRITE UR-MCNC: NEGATIVE — SIGNIFICANT CHANGE UP
NT-PROBNP SERPL-SCNC: 638 PG/ML — HIGH (ref 0–125)
PH UR: 5 — SIGNIFICANT CHANGE UP (ref 5–8)
PLATELET # BLD AUTO: 244 K/UL — SIGNIFICANT CHANGE UP (ref 150–400)
POTASSIUM SERPL-MCNC: 4.1 MMOL/L — SIGNIFICANT CHANGE UP (ref 3.5–5.3)
POTASSIUM SERPL-SCNC: 4.1 MMOL/L — SIGNIFICANT CHANGE UP (ref 3.5–5.3)
PROT SERPL-MCNC: 7.3 G/DL — SIGNIFICANT CHANGE UP (ref 6–8.3)
PROT UR-MCNC: 25 MG/DL
PROTHROM AB SERPL-ACNC: 11.3 SEC — SIGNIFICANT CHANGE UP (ref 9.8–12.7)
RBC # BLD: 4.6 M/UL — SIGNIFICANT CHANGE UP (ref 3.8–5.2)
RBC # FLD: 15.1 % — HIGH (ref 10.3–14.5)
SODIUM SERPL-SCNC: 137 MMOL/L — SIGNIFICANT CHANGE UP (ref 135–145)
SP GR SPEC: 1.01 — SIGNIFICANT CHANGE UP (ref 1.01–1.02)
TROPONIN I SERPL-MCNC: 0.03 NG/ML — SIGNIFICANT CHANGE UP (ref 0.01–0.04)
UROBILINOGEN FLD QL: NEGATIVE — SIGNIFICANT CHANGE UP
WBC # BLD: 6.4 K/UL — SIGNIFICANT CHANGE UP (ref 3.8–10.5)
WBC # FLD AUTO: 6.4 K/UL — SIGNIFICANT CHANGE UP (ref 3.8–10.5)

## 2018-01-08 PROCEDURE — 85610 PROTHROMBIN TIME: CPT

## 2018-01-08 PROCEDURE — 84484 ASSAY OF TROPONIN QUANT: CPT

## 2018-01-08 PROCEDURE — 82550 ASSAY OF CK (CPK): CPT

## 2018-01-08 PROCEDURE — 74174 CTA ABD&PLVS W/CONTRAST: CPT | Mod: 26

## 2018-01-08 PROCEDURE — 70450 CT HEAD/BRAIN W/O DYE: CPT

## 2018-01-08 PROCEDURE — 83690 ASSAY OF LIPASE: CPT

## 2018-01-08 PROCEDURE — 99284 EMERGENCY DEPT VISIT MOD MDM: CPT | Mod: 25

## 2018-01-08 PROCEDURE — 96374 THER/PROPH/DIAG INJ IV PUSH: CPT | Mod: XU

## 2018-01-08 PROCEDURE — 71045 X-RAY EXAM CHEST 1 VIEW: CPT

## 2018-01-08 PROCEDURE — 82962 GLUCOSE BLOOD TEST: CPT

## 2018-01-08 PROCEDURE — 70450 CT HEAD/BRAIN W/O DYE: CPT | Mod: 26

## 2018-01-08 PROCEDURE — 83880 ASSAY OF NATRIURETIC PEPTIDE: CPT

## 2018-01-08 PROCEDURE — 82553 CREATINE MB FRACTION: CPT

## 2018-01-08 PROCEDURE — 81001 URINALYSIS AUTO W/SCOPE: CPT

## 2018-01-08 PROCEDURE — G0463: CPT

## 2018-01-08 PROCEDURE — 80053 COMPREHEN METABOLIC PANEL: CPT

## 2018-01-08 PROCEDURE — 74174 CTA ABD&PLVS W/CONTRAST: CPT

## 2018-01-08 PROCEDURE — 83735 ASSAY OF MAGNESIUM: CPT

## 2018-01-08 PROCEDURE — 99285 EMERGENCY DEPT VISIT HI MDM: CPT

## 2018-01-08 PROCEDURE — 71275 CT ANGIOGRAPHY CHEST: CPT | Mod: 26

## 2018-01-08 PROCEDURE — 71275 CT ANGIOGRAPHY CHEST: CPT

## 2018-01-08 PROCEDURE — 71045 X-RAY EXAM CHEST 1 VIEW: CPT | Mod: 26

## 2018-01-08 PROCEDURE — 85730 THROMBOPLASTIN TIME PARTIAL: CPT

## 2018-01-08 PROCEDURE — 85027 COMPLETE CBC AUTOMATED: CPT

## 2018-01-08 RX ORDER — SODIUM CHLORIDE 9 MG/ML
1000 INJECTION INTRAMUSCULAR; INTRAVENOUS; SUBCUTANEOUS ONCE
Qty: 0 | Refills: 0 | Status: COMPLETED | OUTPATIENT
Start: 2018-01-08 | End: 2018-01-08

## 2018-01-08 RX ORDER — ACETAMINOPHEN 500 MG
650 TABLET ORAL ONCE
Qty: 0 | Refills: 0 | Status: COMPLETED | OUTPATIENT
Start: 2018-01-08 | End: 2018-01-08

## 2018-01-08 RX ORDER — NALOXONE HYDROCHLORIDE 4 MG/.1ML
0.4 SPRAY NASAL ONCE
Qty: 0 | Refills: 0 | Status: COMPLETED | OUTPATIENT
Start: 2018-01-08 | End: 2018-01-08

## 2018-01-08 RX ADMIN — SODIUM CHLORIDE 1000 MILLILITER(S): 9 INJECTION INTRAMUSCULAR; INTRAVENOUS; SUBCUTANEOUS at 13:56

## 2018-01-08 RX ADMIN — Medication 650 MILLIGRAM(S): at 14:08

## 2018-01-08 RX ADMIN — NALOXONE HYDROCHLORIDE 0.4 MILLIGRAM(S): 4 SPRAY NASAL at 14:03

## 2018-01-08 NOTE — ED PROVIDER NOTE - OBJECTIVE STATEMENT
66 yo female hx of paraplegia, sent in from wound care for hypotension (BP 88/54) at the facility with associated weakness/somnolence, decreased PO intake.  Being treated for UTI. 68 yo female hx of paraplegia, sent in from wound care for hypotension (BP 88/54) at the facility with associated weakness/somnolence, decreased PO intake.  Being treated for UTI.  Admits to taking percocet and methadone this morning that her  was not aware about.  PMD Dr. Basil Branham 66 yo female hx of paraplegia, sent in from wound care for hypotension (BP 88/54) at the facility with associated weakness/somnolence, decreased PO intake.  Being treated for UTI.  On day 6 out of 10 for abx Admits to taking percocet and methadone this morning that her  was not aware about.  As per , patient mental status at baseline. PMD Dr. Basil Branham

## 2018-01-08 NOTE — ED ADULT NURSE NOTE - OBJECTIVE STATEMENT
pt to Ed from wound care c/o hypotension and lethargy. Pt admits to taking several medications and currently is being treated for UTI,  at bedside skin intact will continue to monitor

## 2018-01-08 NOTE — ED PROVIDER NOTE - CONSTITUTIONAL, MLM
normal... somnolent, awake, alert, oriented to person, place, time/situation and in no apparent distress.

## 2018-01-08 NOTE — ED PROVIDER NOTE - PROGRESS NOTE DETAILS
patient mental status improved,  states this is her baseline, wants to take patient home, discussed case with Dr. Thomas, no acute dissection/leakage 's systolic, patient answering questions, will f/u with PMD Dr. Branham,  wants to take her home

## 2018-01-09 ENCOUNTER — APPOINTMENT (OUTPATIENT)
Dept: PHYSICAL MEDICINE AND REHAB | Facility: CLINIC | Age: 68
End: 2018-01-09
Payer: MEDICARE

## 2018-01-09 VITALS
TEMPERATURE: 98.1 F | HEART RATE: 139 BPM | OXYGEN SATURATION: 96 % | SYSTOLIC BLOOD PRESSURE: 154 MMHG | DIASTOLIC BLOOD PRESSURE: 106 MMHG

## 2018-01-09 DIAGNOSIS — D50.0 IRON DEFICIENCY ANEMIA SECONDARY TO BLOOD LOSS (CHRONIC): ICD-10-CM

## 2018-01-09 DIAGNOSIS — G82.20 PARAPLEGIA, UNSPECIFIED: ICD-10-CM

## 2018-01-09 DIAGNOSIS — N39.0 URINARY TRACT INFECTION, SITE NOT SPECIFIED: ICD-10-CM

## 2018-01-09 DIAGNOSIS — E07.9 DISORDER OF THYROID, UNSPECIFIED: ICD-10-CM

## 2018-01-09 DIAGNOSIS — I77.72 DISSECTION OF ILIAC ARTERY: ICD-10-CM

## 2018-01-09 DIAGNOSIS — I95.9 HYPOTENSION, UNSPECIFIED: ICD-10-CM

## 2018-01-09 PROCEDURE — 99214 OFFICE O/P EST MOD 30 MIN: CPT

## 2018-01-09 RX ORDER — METHADONE HYDROCHLORIDE 5 MG/1
5 TABLET ORAL
Qty: 150 | Refills: 0 | Status: DISCONTINUED | COMMUNITY
Start: 2017-08-23 | End: 2018-01-09

## 2018-01-09 RX ORDER — GABAPENTIN 400 MG/1
400 CAPSULE ORAL 3 TIMES DAILY
Qty: 90 | Refills: 5 | Status: DISCONTINUED | COMMUNITY
Start: 2017-10-20 | End: 2018-01-09

## 2018-01-09 RX ORDER — VALACYCLOVIR 1 G/1
1 TABLET, FILM COATED ORAL
Qty: 60 | Refills: 4 | Status: DISCONTINUED | COMMUNITY
Start: 2017-11-14 | End: 2018-01-09

## 2018-01-09 RX ORDER — OXYCODONE AND ACETAMINOPHEN 5; 325 MG/1; MG/1
5-325 TABLET ORAL
Qty: 180 | Refills: 0 | Status: DISCONTINUED | COMMUNITY
Start: 2017-09-19 | End: 2018-01-09

## 2018-01-09 RX ORDER — GABAPENTIN 400 MG/1
400 CAPSULE ORAL 3 TIMES DAILY
Qty: 90 | Refills: 2 | Status: DISCONTINUED | COMMUNITY
Start: 2017-11-17 | End: 2018-01-09

## 2018-01-10 ENCOUNTER — TRANSCRIPTION ENCOUNTER (OUTPATIENT)
Age: 68
End: 2018-01-10

## 2018-01-12 ENCOUNTER — APPOINTMENT (OUTPATIENT)
Dept: PHYSICAL MEDICINE AND REHAB | Facility: CLINIC | Age: 68
End: 2018-01-12
Payer: MEDICARE

## 2018-01-22 ENCOUNTER — OUTPATIENT (OUTPATIENT)
Dept: OUTPATIENT SERVICES | Facility: HOSPITAL | Age: 68
LOS: 1 days | Discharge: HOME | End: 2018-01-22
Payer: MEDICARE

## 2018-01-22 DIAGNOSIS — Z98.89 OTHER SPECIFIED POSTPROCEDURAL STATES: Chronic | ICD-10-CM

## 2018-01-22 DIAGNOSIS — L92.9 GRANULOMATOUS DISORDER OF THE SKIN AND SUBCUTANEOUS TISSUE, UNSPECIFIED: ICD-10-CM

## 2018-01-22 PROCEDURE — G0463: CPT

## 2018-01-23 ENCOUNTER — RX RENEWAL (OUTPATIENT)
Age: 68
End: 2018-01-23

## 2018-01-23 DIAGNOSIS — H16.002 UNSPECIFIED CORNEAL ULCER, LEFT EYE: ICD-10-CM

## 2018-01-23 RX ORDER — PREDNISOLONE ACETATE 10 MG/ML
1 SUSPENSION/ DROPS OPHTHALMIC
Qty: 1 | Refills: 5 | Status: ACTIVE | COMMUNITY
Start: 2018-01-23 | End: 1900-01-01

## 2018-01-24 DIAGNOSIS — Z88.8 ALLERGY STATUS TO OTHER DRUGS, MEDICAMENTS AND BIOLOGICAL SUBSTANCES STATUS: ICD-10-CM

## 2018-01-24 DIAGNOSIS — Z87.891 PERSONAL HISTORY OF NICOTINE DEPENDENCE: ICD-10-CM

## 2018-01-24 DIAGNOSIS — L89.153 PRESSURE ULCER OF SACRAL REGION, STAGE 3: ICD-10-CM

## 2018-01-24 DIAGNOSIS — R32 UNSPECIFIED URINARY INCONTINENCE: ICD-10-CM

## 2018-01-24 DIAGNOSIS — R63.6 UNDERWEIGHT: ICD-10-CM

## 2018-01-24 DIAGNOSIS — G82.20 PARAPLEGIA, UNSPECIFIED: ICD-10-CM

## 2018-01-24 DIAGNOSIS — Z98.890 OTHER SPECIFIED POSTPROCEDURAL STATES: ICD-10-CM

## 2018-01-24 DIAGNOSIS — Z79.899 OTHER LONG TERM (CURRENT) DRUG THERAPY: ICD-10-CM

## 2018-01-25 DIAGNOSIS — G47.00 INSOMNIA, UNSPECIFIED: ICD-10-CM

## 2018-01-29 ENCOUNTER — APPOINTMENT (OUTPATIENT)
Dept: OPHTHALMOLOGY | Facility: HOSPITAL | Age: 68
End: 2018-01-29

## 2018-01-30 ENCOUNTER — APPOINTMENT (OUTPATIENT)
Dept: OPHTHALMOLOGY | Facility: CLINIC | Age: 68
End: 2018-01-30

## 2018-01-31 ENCOUNTER — APPOINTMENT (OUTPATIENT)
Dept: PHYSICAL MEDICINE AND REHAB | Facility: CLINIC | Age: 68
End: 2018-01-31
Payer: MEDICARE

## 2018-01-31 VITALS
OXYGEN SATURATION: 97 % | HEART RATE: 125 BPM | DIASTOLIC BLOOD PRESSURE: 92 MMHG | TEMPERATURE: 98 F | SYSTOLIC BLOOD PRESSURE: 165 MMHG

## 2018-01-31 DIAGNOSIS — G56.32 LESION OF RADIAL NERVE, LEFT UPPER LIMB: ICD-10-CM

## 2018-01-31 PROCEDURE — 95908 NRV CNDJ TST 3-4 STUDIES: CPT

## 2018-02-01 ENCOUNTER — TRANSCRIPTION ENCOUNTER (OUTPATIENT)
Age: 68
End: 2018-02-01

## 2018-02-05 ENCOUNTER — OUTPATIENT (OUTPATIENT)
Dept: OUTPATIENT SERVICES | Facility: HOSPITAL | Age: 68
LOS: 1 days | Discharge: HOME | End: 2018-02-05
Payer: MEDICARE

## 2018-02-05 ENCOUNTER — INPATIENT (INPATIENT)
Facility: HOSPITAL | Age: 68
LOS: 2 days | Discharge: HOME | DRG: 308 | End: 2018-02-08
Attending: HOSPITALIST | Admitting: HOSPITALIST
Payer: MEDICARE

## 2018-02-05 VITALS
TEMPERATURE: 98 F | HEART RATE: 133 BPM | RESPIRATION RATE: 20 BRPM | DIASTOLIC BLOOD PRESSURE: 101 MMHG | SYSTOLIC BLOOD PRESSURE: 156 MMHG | OXYGEN SATURATION: 97 %

## 2018-02-05 DIAGNOSIS — Z98.89 OTHER SPECIFIED POSTPROCEDURAL STATES: Chronic | ICD-10-CM

## 2018-02-05 DIAGNOSIS — L89.153 PRESSURE ULCER OF SACRAL REGION, STAGE 3: ICD-10-CM

## 2018-02-05 DIAGNOSIS — Z98.890 OTHER SPECIFIED POSTPROCEDURAL STATES: ICD-10-CM

## 2018-02-05 DIAGNOSIS — R32 UNSPECIFIED URINARY INCONTINENCE: ICD-10-CM

## 2018-02-05 DIAGNOSIS — Z79.899 OTHER LONG TERM (CURRENT) DRUG THERAPY: ICD-10-CM

## 2018-02-05 DIAGNOSIS — G82.20 PARAPLEGIA, UNSPECIFIED: ICD-10-CM

## 2018-02-05 DIAGNOSIS — Z87.891 PERSONAL HISTORY OF NICOTINE DEPENDENCE: ICD-10-CM

## 2018-02-05 DIAGNOSIS — R63.6 UNDERWEIGHT: ICD-10-CM

## 2018-02-05 DIAGNOSIS — R00.0 TACHYCARDIA, UNSPECIFIED: ICD-10-CM

## 2018-02-05 DIAGNOSIS — L92.9 GRANULOMATOUS DISORDER OF THE SKIN AND SUBCUTANEOUS TISSUE, UNSPECIFIED: ICD-10-CM

## 2018-02-05 DIAGNOSIS — Z88.8 ALLERGY STATUS TO OTHER DRUGS, MEDICAMENTS AND BIOLOGICAL SUBSTANCES: ICD-10-CM

## 2018-02-05 LAB
ALBUMIN SERPL ELPH-MCNC: 3.7 G/DL — SIGNIFICANT CHANGE UP (ref 3.3–5)
ALP SERPL-CCNC: 112 U/L — SIGNIFICANT CHANGE UP (ref 40–120)
ALT FLD-CCNC: 10 U/L RC — SIGNIFICANT CHANGE UP (ref 10–45)
ANION GAP SERPL CALC-SCNC: 12 MMOL/L — SIGNIFICANT CHANGE UP (ref 5–17)
APPEARANCE UR: ABNORMAL
APTT BLD: 34.5 SEC — SIGNIFICANT CHANGE UP (ref 27.5–37.4)
AST SERPL-CCNC: 14 U/L — SIGNIFICANT CHANGE UP (ref 10–40)
BASOPHILS # BLD AUTO: 0 K/UL — SIGNIFICANT CHANGE UP (ref 0–0.2)
BASOPHILS NFR BLD AUTO: 0.4 % — SIGNIFICANT CHANGE UP (ref 0–2)
BILIRUB SERPL-MCNC: 0.4 MG/DL — SIGNIFICANT CHANGE UP (ref 0.2–1.2)
BILIRUB UR-MCNC: NEGATIVE — SIGNIFICANT CHANGE UP
BUN SERPL-MCNC: 16 MG/DL — SIGNIFICANT CHANGE UP (ref 7–23)
CALCIUM SERPL-MCNC: 9.6 MG/DL — SIGNIFICANT CHANGE UP (ref 8.4–10.5)
CHLORIDE SERPL-SCNC: 97 MMOL/L — SIGNIFICANT CHANGE UP (ref 96–108)
CK MB CFR SERPL CALC: 3 NG/ML — SIGNIFICANT CHANGE UP (ref 0–3.8)
CK SERPL-CCNC: 30 U/L — SIGNIFICANT CHANGE UP (ref 25–170)
CO2 SERPL-SCNC: 30 MMOL/L — SIGNIFICANT CHANGE UP (ref 22–31)
COLOR SPEC: YELLOW — SIGNIFICANT CHANGE UP
CREAT SERPL-MCNC: 0.41 MG/DL — LOW (ref 0.5–1.3)
DIFF PNL FLD: NEGATIVE — SIGNIFICANT CHANGE UP
EOSINOPHIL # BLD AUTO: 0.1 K/UL — SIGNIFICANT CHANGE UP (ref 0–0.5)
EOSINOPHIL NFR BLD AUTO: 1 % — SIGNIFICANT CHANGE UP (ref 0–6)
GAS PNL BLDV: SIGNIFICANT CHANGE UP
GLUCOSE SERPL-MCNC: 115 MG/DL — HIGH (ref 70–99)
GLUCOSE UR QL: NEGATIVE — SIGNIFICANT CHANGE UP
HCT VFR BLD CALC: 35.6 % — SIGNIFICANT CHANGE UP (ref 34.5–45)
HGB BLD-MCNC: 11.7 G/DL — SIGNIFICANT CHANGE UP (ref 11.5–15.5)
INR BLD: 1.18 RATIO — HIGH (ref 0.88–1.16)
KETONES UR-MCNC: NEGATIVE — SIGNIFICANT CHANGE UP
LEUKOCYTE ESTERASE UR-ACNC: ABNORMAL
LYMPHOCYTES # BLD AUTO: 1.2 K/UL — SIGNIFICANT CHANGE UP (ref 1–3.3)
LYMPHOCYTES # BLD AUTO: 17.9 % — SIGNIFICANT CHANGE UP (ref 13–44)
MCHC RBC-ENTMCNC: 29.1 PG — SIGNIFICANT CHANGE UP (ref 27–34)
MCHC RBC-ENTMCNC: 32.9 GM/DL — SIGNIFICANT CHANGE UP (ref 32–36)
MCV RBC AUTO: 88.3 FL — SIGNIFICANT CHANGE UP (ref 80–100)
MONOCYTES # BLD AUTO: 0.7 K/UL — SIGNIFICANT CHANGE UP (ref 0–0.9)
MONOCYTES NFR BLD AUTO: 10.1 % — SIGNIFICANT CHANGE UP (ref 2–14)
NEUTROPHILS # BLD AUTO: 4.6 K/UL — SIGNIFICANT CHANGE UP (ref 1.8–7.4)
NEUTROPHILS NFR BLD AUTO: 70.6 % — SIGNIFICANT CHANGE UP (ref 43–77)
NITRITE UR-MCNC: POSITIVE
PH UR: 6.5 — SIGNIFICANT CHANGE UP (ref 5–8)
PLATELET # BLD AUTO: 351 K/UL — SIGNIFICANT CHANGE UP (ref 150–400)
POTASSIUM SERPL-MCNC: 3.6 MMOL/L — SIGNIFICANT CHANGE UP (ref 3.5–5.3)
POTASSIUM SERPL-SCNC: 3.6 MMOL/L — SIGNIFICANT CHANGE UP (ref 3.5–5.3)
PROT SERPL-MCNC: 6.7 G/DL — SIGNIFICANT CHANGE UP (ref 6–8.3)
PROT UR-MCNC: 30 MG/DL
PROTHROM AB SERPL-ACNC: 12.8 SEC — HIGH (ref 9.8–12.7)
RBC # BLD: 4.03 M/UL — SIGNIFICANT CHANGE UP (ref 3.8–5.2)
RBC # FLD: 13.2 % — SIGNIFICANT CHANGE UP (ref 10.3–14.5)
SODIUM SERPL-SCNC: 139 MMOL/L — SIGNIFICANT CHANGE UP (ref 135–145)
SP GR SPEC: 1.01 — SIGNIFICANT CHANGE UP (ref 1.01–1.02)
TROPONIN T SERPL-MCNC: <0.01 NG/ML — SIGNIFICANT CHANGE UP (ref 0–0.06)
UROBILINOGEN FLD QL: NEGATIVE — SIGNIFICANT CHANGE UP
WBC # BLD: 6.6 K/UL — SIGNIFICANT CHANGE UP (ref 3.8–10.5)
WBC # FLD AUTO: 6.6 K/UL — SIGNIFICANT CHANGE UP (ref 3.8–10.5)

## 2018-02-05 PROCEDURE — 71045 X-RAY EXAM CHEST 1 VIEW: CPT | Mod: 26

## 2018-02-05 PROCEDURE — 99285 EMERGENCY DEPT VISIT HI MDM: CPT | Mod: 25,GC

## 2018-02-05 PROCEDURE — 93010 ELECTROCARDIOGRAM REPORT: CPT

## 2018-02-05 PROCEDURE — G0463: CPT

## 2018-02-05 PROCEDURE — 71275 CT ANGIOGRAPHY CHEST: CPT | Mod: 26

## 2018-02-05 RX ORDER — AZITHROMYCIN 500 MG/1
500 TABLET, FILM COATED ORAL ONCE
Qty: 0 | Refills: 0 | Status: COMPLETED | OUTPATIENT
Start: 2018-02-05 | End: 2018-02-05

## 2018-02-05 RX ORDER — CEFTRIAXONE 500 MG/1
1 INJECTION, POWDER, FOR SOLUTION INTRAMUSCULAR; INTRAVENOUS ONCE
Qty: 0 | Refills: 0 | Status: DISCONTINUED | OUTPATIENT
Start: 2018-02-05 | End: 2018-02-05

## 2018-02-05 RX ORDER — BACLOFEN 100 %
20 POWDER (GRAM) MISCELLANEOUS ONCE
Qty: 0 | Refills: 0 | Status: COMPLETED | OUTPATIENT
Start: 2018-02-05 | End: 2018-02-05

## 2018-02-05 RX ORDER — CEFTRIAXONE 500 MG/1
1 INJECTION, POWDER, FOR SOLUTION INTRAMUSCULAR; INTRAVENOUS ONCE
Qty: 0 | Refills: 0 | Status: COMPLETED | OUTPATIENT
Start: 2018-02-05 | End: 2018-02-05

## 2018-02-05 RX ORDER — SODIUM CHLORIDE 9 MG/ML
1000 INJECTION INTRAMUSCULAR; INTRAVENOUS; SUBCUTANEOUS ONCE
Qty: 0 | Refills: 0 | Status: COMPLETED | OUTPATIENT
Start: 2018-02-05 | End: 2018-02-05

## 2018-02-05 RX ORDER — ACETAMINOPHEN 500 MG
650 TABLET ORAL ONCE
Qty: 0 | Refills: 0 | Status: COMPLETED | OUTPATIENT
Start: 2018-02-05 | End: 2018-02-05

## 2018-02-05 RX ORDER — CEFPODOXIME PROXETIL 100 MG
1 TABLET ORAL
Qty: 14 | Refills: 0
Start: 2018-02-05 | End: 2018-02-11

## 2018-02-05 RX ADMIN — SODIUM CHLORIDE 2000 MILLILITER(S): 9 INJECTION INTRAMUSCULAR; INTRAVENOUS; SUBCUTANEOUS at 16:40

## 2018-02-05 RX ADMIN — AZITHROMYCIN 250 MILLIGRAM(S): 500 TABLET, FILM COATED ORAL at 22:50

## 2018-02-05 RX ADMIN — CEFTRIAXONE 100 GRAM(S): 500 INJECTION, POWDER, FOR SOLUTION INTRAMUSCULAR; INTRAVENOUS at 18:10

## 2018-02-05 NOTE — ED PROVIDER NOTE - ATTENDING CONTRIBUTION TO CARE
I have seen and evaluated this patient with the resident.   I agree with the findings  unless other wise stated.  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 fever No n/v no abdominal pain chest pain or sob Alert no distress thin Left eye ptosis and blindness changes of left wrist drop heart regular NOT tachycardic lungs clear to A and P abdomen soft has decubiti ulcers pt refusing to remove dressing paraplegia+ No CVA tenderness Had Flu vaccine Possible UTI causing symptoms will evaluate with labs cxr tele re eval--Lagunas

## 2018-02-05 NOTE — ED ADULT NURSE NOTE - OBJECTIVE STATEMENT
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 At office, patient reported palpitations and was noted to be tachycardic.  Upon arrival to ED, patient tachycardic, respirations unlabored. Abdomen is soft, nondistended, nontender to palpation. +Pressure ulcer to sacrum, patient reports that the dressing was changed today and requests that I not move dressing to assess. Patient unable to move bilateral legs, no sensation to legs. Patient self-catheterizes at home.  at bedside.

## 2018-02-05 NOTE — ED PROVIDER NOTE - CARE PLAN
Principal Discharge DX:	Tachycardia  Secondary Diagnosis:	UTI (urinary tract infection)  Secondary Diagnosis:	Mucus plugging of bronchi

## 2018-02-05 NOTE — ED ADULT NURSE NOTE - PERIPHERAL VASCULAR WDL
Call Reason: Schedule per Dr. Lewis  Visit Type: Consult  When appt should be scheduled: Next available  Provider: Dr. Lewis  Appointment Note: Neuro-op: Blurry vision left eye, double vision, eye pressure - VF/OCT/Don't dilate until motility checked      *Do NOT schedule on Tuesday's and Friday's   (unless otherwise specified in appt. details)    Thank you     Pulses equal bilaterally, no edema present.

## 2018-02-05 NOTE — ED PROVIDER NOTE - MEDICAL DECISION MAKING DETAILS
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 fever No n/v no abdominal pain chest pain or sob Alert no distress thin Left eye ptosis and blindness changes of left wrist drop heart regular NOT tachycardic lungs clear to A and P abdomen soft has decubiti ulcers pt refusing to remove dressing paraplegia+ No CVA tenderness Had Flu vaccine Possible UTI causing symptoms will evaluate with labs cxr tele re christen--Lagunas

## 2018-02-05 NOTE — ED ADULT TRIAGE NOTE - CHIEF COMPLAINT QUOTE
2 years ago bleeding in cord, paralysed in wheelchair. Was at wound care for sacral ulcer care and sent by them to cardiologist for palpitations. Saw Basil Branham (cards) today and Dr. Branham sent patient to ER.

## 2018-02-05 NOTE — ED PROVIDER NOTE - OBJECTIVE STATEMENT
67 year old woman PMH paraplegia, aortic dissection repaired, HTN, frequent UTIs p/w tachycardia. Was at cardiologist who noted sinus tachycardia sent in for work-up. Pt feels well, no complaints, no chest pain, abdominal pain, shortness of breath, fever/chills. Incontinent of urine at baseline. Hx DVT s/p IVC filter.

## 2018-02-06 ENCOUNTER — APPOINTMENT (OUTPATIENT)
Dept: OPHTHALMOLOGY | Facility: CLINIC | Age: 68
End: 2018-02-06

## 2018-02-06 DIAGNOSIS — N39.0 URINARY TRACT INFECTION, SITE NOT SPECIFIED: ICD-10-CM

## 2018-02-06 DIAGNOSIS — I71.01 DISSECTION OF THORACIC AORTA: ICD-10-CM

## 2018-02-06 DIAGNOSIS — Z29.9 ENCOUNTER FOR PROPHYLACTIC MEASURES, UNSPECIFIED: ICD-10-CM

## 2018-02-06 DIAGNOSIS — G82.20 PARAPLEGIA, UNSPECIFIED: ICD-10-CM

## 2018-02-06 DIAGNOSIS — R00.0 TACHYCARDIA, UNSPECIFIED: ICD-10-CM

## 2018-02-06 DIAGNOSIS — L89.109 PRESSURE ULCER OF UNSPECIFIED PART OF BACK, UNSPECIFIED STAGE: ICD-10-CM

## 2018-02-06 LAB
ANION GAP SERPL CALC-SCNC: 10 MMOL/L — SIGNIFICANT CHANGE UP (ref 5–17)
BUN SERPL-MCNC: 12 MG/DL — SIGNIFICANT CHANGE UP (ref 7–23)
CALCIUM SERPL-MCNC: 9.1 MG/DL — SIGNIFICANT CHANGE UP (ref 8.4–10.5)
CHLORIDE SERPL-SCNC: 104 MMOL/L — SIGNIFICANT CHANGE UP (ref 96–108)
CK MB CFR SERPL CALC: 2.6 NG/ML — SIGNIFICANT CHANGE UP (ref 0–3.8)
CK SERPL-CCNC: 28 U/L — SIGNIFICANT CHANGE UP (ref 25–170)
CO2 SERPL-SCNC: 28 MMOL/L — SIGNIFICANT CHANGE UP (ref 22–31)
CREAT SERPL-MCNC: 0.37 MG/DL — LOW (ref 0.5–1.3)
GLUCOSE SERPL-MCNC: 106 MG/DL — HIGH (ref 70–99)
HCT VFR BLD CALC: 30.4 % — LOW (ref 34.5–45)
HGB BLD-MCNC: 10.1 G/DL — LOW (ref 11.5–15.5)
LEGIONELLA AG UR QL: NEGATIVE — SIGNIFICANT CHANGE UP
MCHC RBC-ENTMCNC: 29.2 PG — SIGNIFICANT CHANGE UP (ref 27–34)
MCHC RBC-ENTMCNC: 33.3 GM/DL — SIGNIFICANT CHANGE UP (ref 32–36)
MCV RBC AUTO: 87.7 FL — SIGNIFICANT CHANGE UP (ref 80–100)
PLATELET # BLD AUTO: 334 K/UL — SIGNIFICANT CHANGE UP (ref 150–400)
POTASSIUM SERPL-MCNC: 3.5 MMOL/L — SIGNIFICANT CHANGE UP (ref 3.5–5.3)
POTASSIUM SERPL-SCNC: 3.5 MMOL/L — SIGNIFICANT CHANGE UP (ref 3.5–5.3)
RBC # BLD: 3.46 M/UL — LOW (ref 3.8–5.2)
RBC # FLD: 13.2 % — SIGNIFICANT CHANGE UP (ref 10.3–14.5)
SODIUM SERPL-SCNC: 142 MMOL/L — SIGNIFICANT CHANGE UP (ref 135–145)
TROPONIN T SERPL-MCNC: <0.01 NG/ML — SIGNIFICANT CHANGE UP (ref 0–0.06)
TSH SERPL-MCNC: 0.03 UIU/ML — LOW (ref 0.27–4.2)
WBC # BLD: 5.3 K/UL — SIGNIFICANT CHANGE UP (ref 3.8–10.5)
WBC # FLD AUTO: 5.3 K/UL — SIGNIFICANT CHANGE UP (ref 3.8–10.5)

## 2018-02-06 PROCEDURE — 99223 1ST HOSP IP/OBS HIGH 75: CPT | Mod: GC

## 2018-02-06 PROCEDURE — 99222 1ST HOSP IP/OBS MODERATE 55: CPT

## 2018-02-06 PROCEDURE — 12345: CPT | Mod: GC,NC

## 2018-02-06 PROCEDURE — 99254 IP/OBS CNSLTJ NEW/EST MOD 60: CPT

## 2018-02-06 RX ORDER — DULOXETINE HYDROCHLORIDE 30 MG/1
20 CAPSULE, DELAYED RELEASE ORAL DAILY
Qty: 0 | Refills: 0 | Status: DISCONTINUED | OUTPATIENT
Start: 2018-02-06 | End: 2018-02-08

## 2018-02-06 RX ORDER — VANCOMYCIN HCL 1 G
750 VIAL (EA) INTRAVENOUS EVERY 12 HOURS
Qty: 0 | Refills: 0 | Status: DISCONTINUED | OUTPATIENT
Start: 2018-02-06 | End: 2018-02-06

## 2018-02-06 RX ORDER — CEFTRIAXONE 500 MG/1
1 INJECTION, POWDER, FOR SOLUTION INTRAMUSCULAR; INTRAVENOUS EVERY 24 HOURS
Qty: 0 | Refills: 0 | Status: DISCONTINUED | OUTPATIENT
Start: 2018-02-06 | End: 2018-02-06

## 2018-02-06 RX ORDER — NEOMYCIN/POLYMYXIN B/DEXAMETHA 0.1 %
1 SUSPENSION, DROPS(FINAL DOSAGE FORM)(ML) OPHTHALMIC (EYE) EVERY 6 HOURS
Qty: 0 | Refills: 0 | Status: DISCONTINUED | OUTPATIENT
Start: 2018-02-06 | End: 2018-02-08

## 2018-02-06 RX ORDER — OXYCODONE AND ACETAMINOPHEN 5; 325 MG/1; MG/1
1 TABLET ORAL EVERY 4 HOURS
Qty: 0 | Refills: 0 | Status: DISCONTINUED | OUTPATIENT
Start: 2018-02-06 | End: 2018-02-08

## 2018-02-06 RX ORDER — FOLIC ACID 0.8 MG
1 TABLET ORAL DAILY
Qty: 0 | Refills: 0 | Status: DISCONTINUED | OUTPATIENT
Start: 2018-02-06 | End: 2018-02-08

## 2018-02-06 RX ORDER — ENOXAPARIN SODIUM 100 MG/ML
40 INJECTION SUBCUTANEOUS DAILY
Qty: 0 | Refills: 0 | Status: DISCONTINUED | OUTPATIENT
Start: 2018-02-06 | End: 2018-02-08

## 2018-02-06 RX ORDER — PIPERACILLIN AND TAZOBACTAM 4; .5 G/20ML; G/20ML
3.38 INJECTION, POWDER, LYOPHILIZED, FOR SOLUTION INTRAVENOUS ONCE
Qty: 0 | Refills: 0 | Status: COMPLETED | OUTPATIENT
Start: 2018-02-06 | End: 2018-02-06

## 2018-02-06 RX ORDER — VANCOMYCIN HCL 1 G
1000 VIAL (EA) INTRAVENOUS EVERY 12 HOURS
Qty: 0 | Refills: 0 | Status: DISCONTINUED | OUTPATIENT
Start: 2018-02-06 | End: 2018-02-06

## 2018-02-06 RX ORDER — PREGABALIN 225 MG/1
500 CAPSULE ORAL DAILY
Qty: 0 | Refills: 0 | Status: DISCONTINUED | OUTPATIENT
Start: 2018-02-06 | End: 2018-02-08

## 2018-02-06 RX ORDER — CHOLECALCIFEROL (VITAMIN D3) 125 MCG
1000 CAPSULE ORAL DAILY
Qty: 0 | Refills: 0 | Status: DISCONTINUED | OUTPATIENT
Start: 2018-02-06 | End: 2018-02-08

## 2018-02-06 RX ORDER — BACLOFEN 100 %
20 POWDER (GRAM) MISCELLANEOUS
Qty: 0 | Refills: 0 | Status: DISCONTINUED | OUTPATIENT
Start: 2018-02-06 | End: 2018-02-08

## 2018-02-06 RX ORDER — SODIUM CHLORIDE 9 MG/ML
1000 INJECTION INTRAMUSCULAR; INTRAVENOUS; SUBCUTANEOUS
Qty: 0 | Refills: 0 | Status: DISCONTINUED | OUTPATIENT
Start: 2018-02-06 | End: 2018-02-06

## 2018-02-06 RX ORDER — LABETALOL HCL 100 MG
100 TABLET ORAL
Qty: 0 | Refills: 0 | Status: DISCONTINUED | OUTPATIENT
Start: 2018-02-06 | End: 2018-02-08

## 2018-02-06 RX ORDER — PIPERACILLIN AND TAZOBACTAM 4; .5 G/20ML; G/20ML
3.38 INJECTION, POWDER, LYOPHILIZED, FOR SOLUTION INTRAVENOUS EVERY 8 HOURS
Qty: 0 | Refills: 0 | Status: DISCONTINUED | OUTPATIENT
Start: 2018-02-06 | End: 2018-02-06

## 2018-02-06 RX ORDER — VANCOMYCIN HCL 1 G
1000 VIAL (EA) INTRAVENOUS ONCE
Qty: 0 | Refills: 0 | Status: COMPLETED | OUTPATIENT
Start: 2018-02-06 | End: 2018-02-06

## 2018-02-06 RX ORDER — BACLOFEN 100 %
20 POWDER (GRAM) MISCELLANEOUS THREE TIMES A DAY
Qty: 0 | Refills: 0 | Status: DISCONTINUED | OUTPATIENT
Start: 2018-02-06 | End: 2018-02-06

## 2018-02-06 RX ORDER — DORNASE ALFA 1 MG/ML
2.5 SOLUTION RESPIRATORY (INHALATION)
Qty: 0 | Refills: 0 | Status: DISCONTINUED | OUTPATIENT
Start: 2018-02-06 | End: 2018-02-08

## 2018-02-06 RX ORDER — ASCORBIC ACID 60 MG
500 TABLET,CHEWABLE ORAL DAILY
Qty: 0 | Refills: 0 | Status: DISCONTINUED | OUTPATIENT
Start: 2018-02-06 | End: 2018-02-08

## 2018-02-06 RX ORDER — ATORVASTATIN CALCIUM 80 MG/1
40 TABLET, FILM COATED ORAL AT BEDTIME
Qty: 0 | Refills: 0 | Status: DISCONTINUED | OUTPATIENT
Start: 2018-02-06 | End: 2018-02-08

## 2018-02-06 RX ORDER — NEOMYCIN/POLYMYXIN B/DEXAMETHA 0.1 %
1 SUSPENSION, DROPS(FINAL DOSAGE FORM)(ML) OPHTHALMIC (EYE) EVERY 6 HOURS
Qty: 0 | Refills: 0 | Status: DISCONTINUED | OUTPATIENT
Start: 2018-02-06 | End: 2018-02-06

## 2018-02-06 RX ADMIN — OXYCODONE AND ACETAMINOPHEN 1 TABLET(S): 5; 325 TABLET ORAL at 23:36

## 2018-02-06 RX ADMIN — Medication 1000 UNIT(S): at 13:22

## 2018-02-06 RX ADMIN — Medication 20 MILLIGRAM(S): at 05:25

## 2018-02-06 RX ADMIN — Medication 20 MILLIGRAM(S): at 23:36

## 2018-02-06 RX ADMIN — Medication 650 MILLIGRAM(S): at 01:49

## 2018-02-06 RX ADMIN — Medication 100 MILLIGRAM(S): at 18:05

## 2018-02-06 RX ADMIN — OXYCODONE AND ACETAMINOPHEN 1 TABLET(S): 5; 325 TABLET ORAL at 13:01

## 2018-02-06 RX ADMIN — Medication 500 MILLIGRAM(S): at 13:22

## 2018-02-06 RX ADMIN — Medication 1 DROP(S): at 18:02

## 2018-02-06 RX ADMIN — Medication 1 DROP(S): at 23:36

## 2018-02-06 RX ADMIN — ATORVASTATIN CALCIUM 40 MILLIGRAM(S): 80 TABLET, FILM COATED ORAL at 21:23

## 2018-02-06 RX ADMIN — DORNASE ALFA 2.5 MILLIGRAM(S): 1 SOLUTION RESPIRATORY (INHALATION) at 19:51

## 2018-02-06 RX ADMIN — SODIUM CHLORIDE 100 MILLILITER(S): 9 INJECTION INTRAMUSCULAR; INTRAVENOUS; SUBCUTANEOUS at 05:24

## 2018-02-06 RX ADMIN — Medication 1 DROP(S): at 05:26

## 2018-02-06 RX ADMIN — PREGABALIN 500 MICROGRAM(S): 225 CAPSULE ORAL at 13:21

## 2018-02-06 RX ADMIN — Medication 650 MILLIGRAM(S): at 02:20

## 2018-02-06 RX ADMIN — Medication 1 DROP(S): at 13:28

## 2018-02-06 RX ADMIN — OXYCODONE AND ACETAMINOPHEN 1 TABLET(S): 5; 325 TABLET ORAL at 19:22

## 2018-02-06 RX ADMIN — PIPERACILLIN AND TAZOBACTAM 200 GRAM(S): 4; .5 INJECTION, POWDER, LYOPHILIZED, FOR SOLUTION INTRAVENOUS at 05:25

## 2018-02-06 RX ADMIN — Medication 20 MILLIGRAM(S): at 00:24

## 2018-02-06 RX ADMIN — Medication 20 MILLIGRAM(S): at 13:22

## 2018-02-06 RX ADMIN — OXYCODONE AND ACETAMINOPHEN 1 TABLET(S): 5; 325 TABLET ORAL at 19:56

## 2018-02-06 RX ADMIN — Medication 250 MILLIGRAM(S): at 05:24

## 2018-02-06 RX ADMIN — OXYCODONE AND ACETAMINOPHEN 1 TABLET(S): 5; 325 TABLET ORAL at 14:04

## 2018-02-06 RX ADMIN — ENOXAPARIN SODIUM 40 MILLIGRAM(S): 100 INJECTION SUBCUTANEOUS at 13:22

## 2018-02-06 RX ADMIN — Medication 1 MILLIGRAM(S): at 13:22

## 2018-02-06 RX ADMIN — DULOXETINE HYDROCHLORIDE 20 MILLIGRAM(S): 30 CAPSULE, DELAYED RELEASE ORAL at 13:21

## 2018-02-06 NOTE — H&P ADULT - PROBLEM SELECTOR PLAN 1
Could be related to poor PO intake and dehydration, as tachycardia has resolved with administration of fluids. Infection, such as a UTI may also be an contributing factor.  Tree in bud opacities in the lung may also be contributing, but have unclear significance at this time as the patient is afebrile, HDS and does not have a white count. Could be related to poor PO intake and dehydration, as tachycardia has resolved with administration of fluids. Infection, such as a UTI or PNA may also be an contributing factor.  Tree in bud opacities in the lung may also represent PNA, especially with history of MRSA PNA treated with Vancomycin, but have unclear significance at this time as the patient is afebrile, HDS and does not have a white count. Will need an ID consultation in AM for guidance as to if bronchoscopy is necessary.  Will obtain BCx at this time. Chest PT, incentive spirometry. Send urine legionella. -Unclear etiology; sinus tach on EKG; pt afebrile, no leukocytosis, hemodynamically stable.   -Could be related to poor PO intake and dehydration, as tachycardia has resolved with administration of fluids. Infection, such as a UTI or PNA may also be an contributing factor.  -Tree in bud opacities in the lung may also represent PNA, especially with history of MRSA PNA treated with Vancomycin, but have unclear significance at this time as the patient is afebrile, HDS and does not have a white count. Furthermore, patient does not endorse overt respiratory sx, and has no significant hypoxia on vitals; however, pt presented in similar fashion without overt symptomology in 2016 and was treated with 10 day course of Linezolid, with recent prior CTA not mentioning this current noted mucous impaction/TIB opacities.   -Will need an ID consultation in AM for guidance as to if bronchoscopy is necessary.  -Will obtain BCx at this time. Chest PT, incentive spirometry. Send urine legionella.  -start zosyn for broad spectrum empiric coverage along with above mentioned vancomycin; prior culture data reviewed; recent UCx with E faecalis sens to vanco, prior ecoli resistant to fluoroquinolones otherwise pansensitive, and prior sputum culture with MRSA sensitive to vanco.  -Thus, at this time, plan to continue with empiric abx and f/u infectious w/u and narrow or d/c abx if able in next few days.

## 2018-02-06 NOTE — PROGRESS NOTE ADULT - PROBLEM SELECTOR PLAN 2
-UA positive; no urinary symptoms; possible that patient is chronically colonized given self catheterization.    Continue with Vanc and Zosyn as above, ID christen in am -UA positive; no urinary symptoms; possible that patient is chronically colonized given self catheterization.    -Monitor off abx at this time, ID christen

## 2018-02-06 NOTE — CONSULT NOTE ADULT - ASSESSMENT
66 yo female with HTN, HLD, undiagnosed rheumatological disorder in 2007, Type A aortic thoracic dissection 5/2009 s/p repair, s/p descending aortic aneurysm repair 09/2016, spontaneous subdural spinal cord hemorrhage s/p drainage 08/2014 with 2 detethering of the spinal cord procedures c/b paraplegia now wheelchair bound with multiple UTIs in the setting of self catheterization sent in by her cardiologist for tachycardia. The patient denies any CP, SOB, cough, fever, N/V/D/C, malodorous urine. 68 yo female with HTN, HLD, undiagnosed rheumatological disorder in 2007, Type A aortic thoracic dissection 5/2009 s/p repair, s/p descending aortic aneurysm repair 09/2016, spontaneous subdural spinal cord hemorrhage s/p drainage 08/2014 with 2 detethering of the spinal cord procedures c/b paraplegia now wheelchair bound with multiple UTIs in the setting of self catheterization sent in by her cardiologist for tachycardia.     Patient has a sacral decub which she has been receiving wound care at [facility]. She currently feels well. Denies cough, sputum production, sob, dysuria. As per sister, when she has a UTI, she becomes confused.    CTA chest no pna, no PE, aortic aneurysm stable in size. No localizing signs of infection. Patient states she feels well.    Assessment:  -Tachycardia  -Paraplegia/ wheelchair boud  -Sacral decub does not appear infected at this time.  -Afebrile  -WBC WNL    Recommend:  -Would monitor off antibiotics as patient has received several courses of antibiotics in the past for UTIs.  -F/U blood cx.  -Continue wound care to sacral decub.    Justin Keen MD  Pager [phone]  After 5pm/weekends call [phone]

## 2018-02-06 NOTE — H&P ADULT - HISTORY OF PRESENT ILLNESS
The patient is a 68 yo F with a PMH of HTN, HLD, undiagnosed rheumatological disorder in 2007, Type A aortic thoracic dissection 5/2009 s/p repair, s/p descending aortic aneurysm repair 09/2016, spontaneous subdural spinal cord hemorrhage s/p drainage 08/2014 with 2 dethethering of the spinal cord procedures c/p paraplegia now wheelchair bound with multiple UTIs in the setting of self catheterization sent in by her cardiologist for tachycardia. The patient denies any CP, SOB, cough, F nvd, malodorous urine. States she had been treated for a UTI last week with an antibiotic she cannot recall the name of. Denies any hx of blood clots. Denies any decreased appetite or PO intake.    Vital Signs Last 24 Hrs  T(C): 36.8 (06 Feb 2018 01:39), Max: 37 (05 Feb 2018 23:38)  T(F): 98.3 (06 Feb 2018 01:39), Max: 98.6 (05 Feb 2018 23:38)  HR: 101 (06 Feb 2018 01:39) (101 - 133)  BP: 164/83 (06 Feb 2018 01:39) (145/89 - 177/97)  BP(mean): --  RR: 17 (06 Feb 2018 01:39) (17 - 20)  SpO2: 96% (06 Feb 2018 01:39) (96% - 97%)    UA positive. CTA: mucoid plugging. Given Azithromycin and ceftriaxone, 1 L NS. The patient is a 66 yo F with a PMH of HTN, HLD, undiagnosed rheumatological disorder in 2007, Type A aortic thoracic dissection 5/2009 s/p repair, s/p descending aortic aneurysm repair 09/2016, spontaneous subdural spinal cord hemorrhage s/p drainage 08/2014 with 2 dethethering of the spinal cord procedures c/p paraplegia now wheelchair bound with multiple UTIs in the setting of self catheterization sent in by her cardiologist for tachycardia. The patient denies any CP, SOB, cough, F nvd, malodorous urine. States she had been treated for a UTI last week with an antibiotic she cannot recall the name of. Denies any hx of blood clots. Denies any decreased appetite or PO intake. The patient is seen by Dr. Norris of [facility] who is aware of the aortic dissection, and sees that it is stable, and is recommending repeat CTA in 01/2019. Additionally, the patient had MRSA PNA as confirmed via sputum induction in 2016, which was treated with vancomycin.    Vital Signs Last 24 Hrs  T(C): 36.8 (06 Feb 2018 01:39), Max: 37 (05 Feb 2018 23:38)  T(F): 98.3 (06 Feb 2018 01:39), Max: 98.6 (05 Feb 2018 23:38)  HR: 101 (06 Feb 2018 01:39) (101 - 133)  BP: 164/83 (06 Feb 2018 01:39) (145/89 - 177/97)  BP(mean): --  RR: 17 (06 Feb 2018 01:39) (17 - 20)  SpO2: 96% (06 Feb 2018 01:39) (96% - 97%)    UA positive. CTA: mucoid plugging. Given Azithromycin and ceftriaxone, 1 L NS. The patient is a 66 yo F with a PMH of HTN, HLD, undiagnosed rheumatological disorder in 2007, Type A aortic thoracic dissection 5/2009 s/p repair, s/p descending aortic aneurysm repair 09/2016, spontaneous subdural spinal cord hemorrhage s/p drainage 08/2014 with 2 dethethering of the spinal cord procedures c/p paraplegia now wheelchair bound with multiple UTIs in the setting of self catheterization sent in by her cardiologist for tachycardia. The patient denies any CP, SOB, cough, F nvd, malodorous urine. States she had been treated for a UTI last week with an antibiotic she cannot recall the name of. Denies any hx of blood clots. Denies any decreased appetite or PO intake. The patient is seen by Dr. Norris of [facility] who is aware of the aortic dissection, and sees that it is stable, and is recommending repeat CTA in 01/2019. Additionally, the patient had MRSA PNA as confirmed via sputum induction in 2016, which was treated with vancomycin.    Vital Signs Last 24 Hrs  T(C): 36.8 (06 Feb 2018 01:39), Max: 37 (05 Feb 2018 23:38)  T(F): 98.3 (06 Feb 2018 01:39), Max: 98.6 (05 Feb 2018 23:38)  HR: 101 (06 Feb 2018 01:39) (101 - 133)  BP: 164/83 (06 Feb 2018 01:39) (145/89 - 177/97)  BP(mean): --  RR: 17 (06 Feb 2018 01:39) (17 - 20)  SpO2: 96% (06 Feb 2018 01:39) (96% - 97%)    UA positive. CTA: mucoid plugging, no PE, and chronic various arterial dissections. Given Azithromycin and ceftriaxone, 1 L NS.

## 2018-02-06 NOTE — PATIENT PROFILE ADULT. - VISION (WITH CORRECTIVE LENSES IF THE PATIENT USUALLY WEARS THEM):
wears glasses for far sighted/Partially impaired: cannot see medication labels or newsprint, but can see obstacles in path, and the surrounding layout; can count fingers at arm's length

## 2018-02-06 NOTE — PROGRESS NOTE ADULT - PROBLEM SELECTOR PLAN 1
-Unclear etiology? clinically no sign of infection-no symptoms, no leukocytosis  Patient reports ingestion of a " mushroom supplement" 2 days ago given by son   asked to get the supplement from home to check ingredients.   -reports drinking 7 cups of tea, possibly contributing to tachycardia  Appreciate cardiology recs. -Unclear etiology? clinically no sign of infection-no symptoms, no leukocytosis  Patient reports ingestion of a " mushroom supplement" 2 days ago given by son   asked to get the supplement from home to check ingredients.   -reports drinking 7 cups of tea, possibly contributing to tachycardia  Appreciate cardiology recs.  -Monitor off abx  -ID eval

## 2018-02-06 NOTE — H&P ADULT - PROBLEM SELECTOR PLAN 4
Stable Stable.  Per allscripts: The patient is seen by Dr. Norris of [facility] who is aware of the aortic dissection, and sees that it is stable, and is recommending repeat CTA in 01/2019. -Per allscripts: The patient is seen by Dr. Norris of [facility] and ANEUDY Abraham as an o/p, who are aware of the aortic/carotid/infrarenal aortic dissection, and has documented that it is stable on 2/2/18, and are recommending repeat CTA in 01/2019 in about 1 year's time.  -Pulses palpable distally, and patient is hemodynamically stable currently.

## 2018-02-06 NOTE — H&P ADULT - NSHPSOCIALHISTORY_GEN_ALL_CORE
Denies Illicit drug use or smoking.  Social EtOH  Lives with , HHA  Retired A[employer] teacher  Dependent ADLs. Uses a wheelchair.

## 2018-02-06 NOTE — CHART NOTE - NSCHARTNOTEFT_GEN_A_CORE
68 yo F with a PMH of HTN, HLD, undiagnosed rheumatological disorder in 2007, Type A aortic thoracic dissection 5/2009 s/p repair, s/p descending aortic aneurysm repair 09/2016, spontaneous subdural spinal cord hemorrhage s/p drainage 08/2014 with 2 untethering of the spinal cord procedures c/p paraplegia now wheelchair bound with multiple UTIs in the setting of self catheterization sent in by her cardiologist for tachycardia in the setting of possible UTI and PNA/or recent ingestion of supplements containing "mushroom".   Pt w/ sacral wound being followed at [facility].  Dressing gets changed qod and pt requesting not to change it / evaluated it until tomorrow.  R&B explained and understood.  RN & NP made aware.

## 2018-02-06 NOTE — ED ADULT NURSE REASSESSMENT NOTE - NS ED NURSE REASSESS COMMENT FT1
Patient states "I need to disimpact my bowels," requests gloves and surgi-lube, patient provided with glove and surgi-lube. Will draw labs when patient is finished
Pt straight cathed using sterile technique.  2 RNs present.  Pt tolerated procedure well. Sterile specimen collected. UA and Culture sent.
Awaiting CTA, patient aware of plan. IV ceftriaxone infusing. Patient provided with additional blankets, pt resting comfortably in RH3
MD Lagunas aware that patient was requesting her baclofen rx.
Patient laying in bed with family at bedside. She is requesting tylenol for pain that she has chronically. She states she had her sacral wound dressed today- does not want RN to look at wound or remove dressing. She is pending CTA. Will continue to monitor.
as per tele tech-  sinus tach 108
patient was straight cathed using sterile technique. 1 insertion attempt made. 2 RNs at bedside. 350cc output.
pharmacy was called at [phone] for patients pain medication order to be tubed.

## 2018-02-06 NOTE — CONSULT NOTE ADULT - SUBJECTIVE AND OBJECTIVE BOX
HPI:  The patient is a 66 yo F with a PMH of HTN, HLD, undiagnosed rheumatological disorder in , Type A aortic thoracic dissection 2009 s/p repair, s/p descending aortic aneurysm repair 2016, spontaneous subdural spinal cord hemorrhage s/p drainage 2014 with 2 detethering of the spinal cord procedures c/b paraplegia now wheelchair bound with multiple UTIs in the setting of self catheterization sent in by her cardiologist for tachycardia. The patient denies any CP, SOB, cough, fever, N/V/D/C, malodorous urine. States she had been treated for a UTI last week with an antibiotic she cannot recall the name. Denies any hx of blood clots. Denies any decreased appetite or PO intake. The patient is seen by Dr. Norris of [facility] who is aware of the aortic dissection, and sees that it is stable, and is recommending repeat CTA in 2019. Additionally, the patient had MRSA PNA as confirmed via sputum induction in , which was treated with vancomycin. Patient states she feels well. Does not feel sick. Did not feel the tachycardia.     Vital Signs Last 24 Hrs  T(C): 36.8 (2018 01:39), Max: 37 (2018 23:38)  T(F): 98.3 (2018 01:39), Max: 98.6 (2018 23:38)  HR: 101 (2018 01:39) (101 - 133)  BP: 164/83 (2018 01:39) (145/89 - 177/97)  BP(mean): --  RR: 17 (2018 01:39) (17 - 20)  SpO2: 96% (2018 01:39) (96% - 97%)    PAST MEDICAL & SURGICAL HISTORY:  Paraplegia  Aortic dissection, abdominal: Type B Watched regularly  Aortic dissection, thoracic: Type A Repaired  Blindness of left eye  Chronic constipation  UTI (urinary tract infection)  TIA (transient ischemic attack)  HTN (Hypertension)  S/P aortic dissection repair: Type A Dissection repair  H/O Spinal surgery    Allergies    Cipro (Rash)  Fosamax (Unknown)    Intolerances    ANTIMICROBIALS:      OTHER MEDS:  ascorbic acid 500 milliGRAM(s) Oral daily  atorvastatin 40 milliGRAM(s) Oral at bedtime  baclofen 20 milliGRAM(s) Oral three times a day  cholecalciferol 1000 Unit(s) Oral daily  cyanocobalamin 500 MICROGram(s) Oral daily  dexamethasone/neomycin/polymyxin Suspension 1 Drop(s) Both EYES every 6 hours  dornase tony Solution 2.5 milliGRAM(s) Inhalation two times a day  DULoxetine 20 milliGRAM(s) Oral daily  enoxaparin Injectable 40 milliGRAM(s) SubCutaneous daily  folic acid 1 milliGRAM(s) Oral daily  labetalol 100 milliGRAM(s) Oral two times a day  oxyCODONE    5 mG/acetaminophen 325 mG 1 Tablet(s) Oral every 4 hours PRN  sodium chloride 0.9%. 1000 milliLiter(s) IV Continuous <Continuous>      SOCIAL HISTORY: Denies smoking, social alcohol, medical marijuana    FAMILY HISTORY:  No pertinent family history in first degree relatives      Drug Dosing Weight  Height (cm): 157.48 (2018 01:39)  Weight (kg): 44.4 (2018 01:39)  BMI (kg/m2): 17.9 (2018 01:39)  BSA (m2): 1.41 (2018 01:39)    PE:    Vital Signs Last 24 Hrs  T(C): 37.1 (2018 12:11), Max: 37.1 (2018 12:11)  T(F): 98.8 (2018 12:11), Max: 98.8 (2018 12:11)  HR: 100 (2018 12:11) (100 - 133)  BP: 163/84 (2018 12:11) (145/89 - 177/97)  BP(mean): --  RR: 18 (2018 12:11) (17 - 20)  SpO2: 96% (2018 12:11) (96% - 97%)    Gen: AOx3, NAD, non-toxic, pleasant  CV: S1+S2 normal, no murmurs, nontachycardic  Resp: Clear bilat, no resp distress, no crackles/wheezes  Abd: Soft, nontender, +BS  Ext: No LE edema, no wounds  : No Russo  IV/Skin: No thrombophlebitis  Msk: No low back pain, no arthralgias, no joint swelling  Neuro: No sensory deficits, no motor deficits    LABS:                          10.1   5.3   )-----------( 334      ( 2018 05:19 )             30.4       02-06    142  |  104  |  12  ----------------------------<  106<H>  3.5   |  28  |  0.37<L>    Ca    9.1      2018 05:19    TPro  6.7  /  Alb  3.7  /  TBili  0.4  /  DBili  x   /  AST  14  /  ALT  10  /  AlkPhos  112  02-05      Urinalysis Basic - ( 2018 16:15 )    Color: Yellow / Appearance: SL Turbid / S.015 / pH: x  Gluc: x / Ketone: Negative  / Bili: Negative / Urobili: Negative   Blood: x / Protein: 30 mg/dL / Nitrite: Positive   Leuk Esterase: Small / RBC: 0-2 /HPF / WBC 10-25 /HPF   Sq Epi: x / Non Sq Epi: x / Bacteria: Few /HPF        MICROBIOLOGY:  v            RADIOLOGY: HPI:  The patient is a 68 yo F with a PMH of HTN, HLD, undiagnosed rheumatological disorder in , Type A aortic thoracic dissection 2009 s/p repair, s/p descending aortic aneurysm repair 2016, spontaneous subdural spinal cord hemorrhage s/p drainage 2014 with 2 detethering of the spinal cord procedures c/b paraplegia now wheelchair bound with multiple UTIs in the setting of self catheterization sent in by her cardiologist for tachycardia. The patient denies any CP, SOB, cough, fever, N/V/D/C, malodorous urine. States she had been treated for a UTI last week with an antibiotic she cannot recall the name. Denies any hx of blood clots. Denies any decreased appetite or PO intake. The patient is seen by Dr. Norris of [facility] who is aware of the aortic dissection, and sees that it is stable, and is recommending repeat CTA in 2019. Additionally, the patient had MRSA PNA as confirmed via sputum induction in , which was treated with vancomycin. Patient states she feels well. Does not feel sick. Did not feel the tachycardia. Sister at bedside.    Vital Signs Last 24 Hrs  T(C): 36.8 (2018 01:39), Max: 37 (2018 23:38)  T(F): 98.3 (2018 01:39), Max: 98.6 (2018 23:38)  HR: 101 (2018 01:39) (101 - 133)  BP: 164/83 (2018 01:39) (145/89 - 177/97)  BP(mean): --  RR: 17 (2018 01:39) (17 - 20)  SpO2: 96% (2018 01:39) (96% - 97%)    PAST MEDICAL & SURGICAL HISTORY:  Paraplegia  Aortic dissection, abdominal: Type B Watched regularly  Aortic dissection, thoracic: Type A Repaired  Blindness of left eye  Chronic constipation  UTI (urinary tract infection)  TIA (transient ischemic attack)  HTN (Hypertension)  S/P aortic dissection repair: Type A Dissection repair  H/O Spinal surgery    Allergies    Cipro (Rash)  Fosamax (Unknown)    Intolerances    ANTIMICROBIALS:      OTHER MEDS:  ascorbic acid 500 milliGRAM(s) Oral daily  atorvastatin 40 milliGRAM(s) Oral at bedtime  baclofen 20 milliGRAM(s) Oral three times a day  cholecalciferol 1000 Unit(s) Oral daily  cyanocobalamin 500 MICROGram(s) Oral daily  dexamethasone/neomycin/polymyxin Suspension 1 Drop(s) Both EYES every 6 hours  dornase tony Solution 2.5 milliGRAM(s) Inhalation two times a day  DULoxetine 20 milliGRAM(s) Oral daily  enoxaparin Injectable 40 milliGRAM(s) SubCutaneous daily  folic acid 1 milliGRAM(s) Oral daily  labetalol 100 milliGRAM(s) Oral two times a day  oxyCODONE    5 mG/acetaminophen 325 mG 1 Tablet(s) Oral every 4 hours PRN  sodium chloride 0.9%. 1000 milliLiter(s) IV Continuous <Continuous>      SOCIAL HISTORY: Denies smoking, social alcohol, medical marijuana    FAMILY HISTORY:  No pertinent family history in first degree relatives reviewed with patient      Drug Dosing Weight  Height (cm): 157.48 (2018 01:39)  Weight (kg): 44.4 (2018 01:39)  BMI (kg/m2): 17.9 (2018 01:39)  BSA (m2): 1.41 (2018 01:39)    PE:    Vital Signs Last 24 Hrs  T(C): 37.1 (2018 12:11), Max: 37.1 (2018 12:11)  T(F): 98.8 (2018 12:11), Max: 98.8 (2018 12:11)  HR: 100 (2018 12:11) (100 - 133)  BP: 163/84 (2018 12:11) (145/89 - 177/97)  BP(mean): --  RR: 18 (2018 12:11) (17 - 20)  SpO2: 96% (2018 12:11) (96% - 97%)    Gen: AOx3, NAD, non-toxic, pleasant  CV: S1+S2 normal, no murmurs, nontachycardic  Resp: Clear bilat, no resp distress, no crackles/wheezes  Abd: Soft, nontender, +BS  Ext: No LE edema, no wounds  : No Russo  IV/Skin: No thrombophlebitis  Msk: No low back pain, no arthralgias, no joint swelling  Neuro: No sensory deficits, no motor deficits    LABS:                          10.1   5.3   )-----------( 334      ( 2018 05:19 )             30.4       02-06    142  |  104  |  12  ----------------------------<  106<H>  3.5   |  28  |  0.37<L>    Ca    9.1      2018 05:19    TPro  6.7  /  Alb  3.7  /  TBili  0.4  /  DBili  x   /  AST  14  /  ALT  10  /  AlkPhos  112  02-05      Urinalysis Basic - ( 2018 16:15 )    Color: Yellow / Appearance: SL Turbid / S.015 / pH: x  Gluc: x / Ketone: Negative  / Bili: Negative / Urobili: Negative   Blood: x / Protein: 30 mg/dL / Nitrite: Positive   Leuk Esterase: Small / RBC: 0-2 /HPF / WBC 10-25 /HPF   Sq Epi: x / Non Sq Epi: x / Bacteria: Few /HPF    MICROBIOLOGY:    RADIOLOGY:    < from: CT Angio Chest w/ IV Cont (18 @ 20:36) >  IMPRESSION:     Dissection of the aortic arch/brachiocephalic artery extending to the   visualized right common carotid artery which is unchanged when compared   to 2018.     No evidence of pulmonary embolism.    Mucoid impacted airways as described above.    < end of copied text >    < from: Xray Chest 1 View AP/PA (18 @ 16:15) >  IMPRESSION:   No focal lung consolidations.    < end of copied text > HPI:  The patient is a 68 yo F with a PMH of HTN, HLD, undiagnosed rheumatological disorder in , Type A aortic thoracic dissection 2009 s/p repair, s/p descending aortic aneurysm repair 2016, spontaneous subdural spinal cord hemorrhage s/p drainage 2014 with 2 detethering of the spinal cord procedures c/b paraplegia now wheelchair bound with multiple UTIs in the setting of self catheterization sent in by her cardiologist for tachycardia. The patient denies any CP, SOB, cough, fever, N/V/D/C, malodorous urine. States she had been treated for a UTI last week with an antibiotic she cannot recall the name. Denies any hx of blood clots. Denies any decreased appetite or PO intake. The patient is [facility] who is aware of the aortic dissection, and sees that it is stable, and is recommending repeat CTA in 2019. Additionally, the patient had MRSA PNA as confirmed via sputum induction in , which was treated with vancomycin. Patient states she feels well. Does not feel sick. Did not feel the tachycardia. Sister at bedside. She has been following at [facility] for wound care and dressings were changed yesterday. She has them changed three times weekly. She has never had any antibiotics for the sacral wound.    Vital Signs Last 24 Hrs  T(C): 36.8 (2018 01:39), Max: 37 (2018 23:38)  T(F): 98.3 (:39), Max: 98.6 (2018 23:38)  HR: 101 (:39) (101 - 133)  BP: 164/83 (:) (145/89 - 177/97)  BP(mean): --  RR: 17 (:) (17 - 20)  SpO2: 96% (:) (96% - 97%)    PAST MEDICAL & SURGICAL HISTORY:  Paraplegia  Aortic dissection, abdominal: Type B Watched regularly  Aortic dissection, thoracic: Type A Repaired  Blindness of left eye  Chronic constipation  UTI (urinary tract infection)  TIA (transient ischemic attack)  HTN (Hypertension)  S/P aortic dissection repair: Type A Dissection repair  H/O Spinal surgery    Allergies    Cipro (Rash)  Fosamax (Unknown)    Intolerances    ANTIMICROBIALS:      OTHER MEDS:  ascorbic acid 500 milliGRAM(s) Oral daily  atorvastatin 40 milliGRAM(s) Oral at bedtime  baclofen 20 milliGRAM(s) Oral three times a day  cholecalciferol 1000 Unit(s) Oral daily  cyanocobalamin 500 MICROGram(s) Oral daily  dexamethasone/neomycin/polymyxin Suspension 1 Drop(s) Both EYES every 6 hours  dornase tony Solution 2.5 milliGRAM(s) Inhalation two times a day  DULoxetine 20 milliGRAM(s) Oral daily  enoxaparin Injectable 40 milliGRAM(s) SubCutaneous daily  folic acid 1 milliGRAM(s) Oral daily  labetalol 100 milliGRAM(s) Oral two times a day  oxyCODONE    5 mG/acetaminophen 325 mG 1 Tablet(s) Oral every 4 hours PRN  sodium chloride 0.9%. 1000 milliLiter(s) IV Continuous <Continuous>      SOCIAL HISTORY: Denies smoking, social alcohol, medical marijuana    FAMILY HISTORY:  No pertinent family history in first degree relatives reviewed with patient      Drug Dosing Weight  Height (cm): 157.48 (2018 01:39)  Weight (kg): 44.4 (2018 01:39)  BMI (kg/m2): 17.9 (:39)  BSA (m2): 1.41 (:39)    PE:    Vital Signs Last 24 Hrs  T(C): 37.1 (2018 12:11), Max: 37.1 (2018 12:11)  T(F): 98.8 (2018 12:11), Max: 98.8 (2018 12:11)  HR: 100 (2018 12:11) (100 - 133)  BP: 163/84 (2018 12:11) (145/89 - 177/97)  BP(mean): --  RR: 18 (2018 12:11) (17 - 20)  SpO2: 96% (2018 12:11) (96% - 97%)    Gen: AOx3, NAD, non-toxic, pleasant  CV: S1+S2 normal, no murmurs  Resp: Clear bilat, no resp distress  Abd: Soft, nontender, +BS  Ext: No LE edema  : +Russo  IV/Skin: No thrombophlebitis, sacral decub - no purulence, no malodor, tunnels, does not appear infected  Msk: No low back pain, no arthralgias, no joint swelling  Neuro: bilateral lower extremity weakness    LABS:                          10.1   5.3   )-----------( 334      ( 2018 05:19 )             30.4       02-    142  |  104  |  12  ----------------------------<  106<H>  3.5   |  28  |  0.37<L>    Ca    9.1      2018 05:19    TPro  6.7  /  Alb  3.7  /  TBili  0.4  /  DBili  x   /  AST  14  /  ALT  10  /  AlkPhos  112  02-05      Urinalysis Basic - ( 2018 16:15 )    Color: Yellow / Appearance: SL Turbid / S.015 / pH: x  Gluc: x / Ketone: Negative  / Bili: Negative / Urobili: Negative   Blood: x / Protein: 30 mg/dL / Nitrite: Positive   Leuk Esterase: Small / RBC: 0-2 /HPF / WBC 10-25 /HPF   Sq Epi: x / Non Sq Epi: x / Bacteria: Few /HPF    MICROBIOLOGY:    RADIOLOGY:    < from: CT Angio Chest w/ IV Cont (18 @ 20:36) >  IMPRESSION:     Dissection of the aortic arch/brachiocephalic artery extending to the   visualized right common carotid artery which is unchanged when compared   to 2018.     No evidence of pulmonary embolism.    Mucoid impacted airways as described above.    < end of copied text >    < from: Xray Chest 1 View AP/PA (18 @ 16:15) >  IMPRESSION:   No focal lung consolidations.    < end of copied text >

## 2018-02-06 NOTE — H&P ADULT - ASSESSMENT
68 yo F with a PMH of HTN, HLD, undiagnosed rheumatological disorder in 2007, Type A aortic thoracic dissection 5/2009 s/p repair, s/p descending aortic aneurysm repair 09/2016, spontaneous subdural spinal cord hemorrhage s/p drainage 08/2014 with 2 dethethering of the spinal cord procedures c/p paraplegia now wheelchair bound with multiple UTIs in the setting of self catheterization sent in by her cardiologist for tachycardia in the setting of possible UTI. 66 yo F with a PMH of HTN, HLD, undiagnosed rheumatological disorder in 2007, Type A aortic thoracic dissection 5/2009 s/p repair, s/p descending aortic aneurysm repair 09/2016, spontaneous subdural spinal cord hemorrhage s/p drainage 08/2014 with 2 dethethering of the spinal cord procedures c/p paraplegia now wheelchair bound with multiple UTIs in the setting of self catheterization sent in by her cardiologist for tachycardia in the setting of possible UTI and PNA.

## 2018-02-06 NOTE — H&P ADULT - NSHPLABSRESULTS_GEN_ALL_CORE
11.7   6.6   )-----------( 351      ( 05 Feb 2018 16:32 )             35.6       02-05    139  |  97  |  16  ----------------------------<  115<H>  3.6   |  30  |  0.41<L>    Ca    9.6      05 Feb 2018 16:32    TPro  6.7  /  Alb  3.7  /  TBili  0.4  /  DBili  x   /  AST  14  /  ALT  10  /  AlkPhos  112  02-05    Troponin T, Serum (02.05.18 @ 16:32)    Troponin T, Serum: <0.01 ng/mL        < from: CT Angio Chest w/ IV Cont (02.05.18 @ 20:36) >    IMPRESSION:     Dissection of the aortic arch/brachiocephalic artery extending to the   visualized right common carotid artery which is unchanged when compared   to 1/8/2018.     No evidence of pulmonary embolism.    Mucoid impacted airways as described above.        < end of copied text >    Urinalysis (02.05.18 @ 16:15)    pH Urine: 6.5    Glucose Qualitative, Urine: Negative    Blood, Urine: Negative    Color: Yellow    Urine Appearance: SL Turbid    Bilirubin: Negative    Ketone - Urine: Negative    Specific Gravity: 1.015    Protein, Urine: 30 mg/dL    Urobilinogen: Negative    Nitrite: Positive    Leukocyte Esterase Concentration: Small    UCx pending.    EKG: Sinus tach    Personally reviewed EKG, labs and imaging.

## 2018-02-06 NOTE — H&P ADULT - NSHPPHYSICALEXAM_GEN_ALL_CORE
Vital Signs Last 24 Hrs  T(C): 36.8 (06 Feb 2018 01:39), Max: 37 (05 Feb 2018 23:38)  T(F): 98.3 (06 Feb 2018 01:39), Max: 98.6 (05 Feb 2018 23:38)  HR: 101 (06 Feb 2018 01:39) (101 - 133)  BP: 164/83 (06 Feb 2018 01:39) (145/89 - 177/97)  BP(mean): --  RR: 17 (06 Feb 2018 01:39) (17 - 20)  SpO2: 96% (06 Feb 2018 01:39) (96% - 97%)    PHYSICAL EXAM:    GENERAL: Comfortable, no acute distress   HEAD:  Normocephalic, atraumatic  EYES: EOMI, PERRLA  HEENT: Moist mucous membranes  NECK: Supple, No JVD  NERVOUS SYSTEM:  CN 2-12 intact b/l. Alert & Oriented X3, Motor Strength 5/5 B/L upper ext, 0/5 llower ext. Sensation intact B/L  CHEST/LUNG: Clear to auscultation bilaterally  HEART: Regular rate and rhythm, no murmur   ABDOMEN: Soft, Nontender, Nondistended, Bowel sounds present  EXTREMITIES:   No clubbing, cyanosis, or edema  MUSCULOSKELETAL: No muscle tenderness, no joint tenderness  SKIN: warm and dry, no rash Vital Signs Last 24 Hrs  T(C): 36.8 (06 Feb 2018 01:39), Max: 37 (05 Feb 2018 23:38)  T(F): 98.3 (06 Feb 2018 01:39), Max: 98.6 (05 Feb 2018 23:38)  HR: 101 (06 Feb 2018 01:39) (101 - 133)  BP: 164/83 (06 Feb 2018 01:39) (145/89 - 177/97)  BP(mean): --  RR: 17 (06 Feb 2018 01:39) (17 - 20)  SpO2: 96% (06 Feb 2018 01:39) (96% - 97%)    PHYSICAL EXAM:    GENERAL: Comfortable, no acute distress   HEAD:  Normocephalic, atraumatic  EYES: EOMI, Pupil reactive in R eye. Blind in L eye.  HEENT: Moist mucous membranes  NECK: Supple, No JVD  NERVOUS SYSTEM:  CN 2-12 intact b/l. Alert & Oriented X3, Motor Strength 5/5 B/L upper ext, 0/5 lower ext. Sensation intact B/L  CHEST/LUNG: Clear to auscultation bilaterally  HEART: Regular rate and rhythm, no murmur   ABDOMEN: Soft, Nontender, Nondistended, Bowel sounds present  EXTREMITIES:   No clubbing, cyanosis, or edema  MUSCULOSKELETAL: No muscle tenderness, no joint tenderness  SKIN: warm and dry, no rash. PRessure packing over wound on the back c/d/i. Vital Signs Last 24 Hrs  T(C): 36.8 (06 Feb 2018 01:39), Max: 37 (05 Feb 2018 23:38)  T(F): 98.3 (06 Feb 2018 01:39), Max: 98.6 (05 Feb 2018 23:38)  HR: 101 (06 Feb 2018 01:39) (101 - 133)  BP: 164/83 (06 Feb 2018 01:39) (145/89 - 177/97)  BP(mean): --  RR: 17 (06 Feb 2018 01:39) (17 - 20)  SpO2: 96% (06 Feb 2018 01:39) (96% - 97%)    PHYSICAL EXAM:    GENERAL: Comfortable, no acute distress   HEAD:  Normocephalic, atraumatic  EYES: EOMI, Pupil reactive in R eye. Blind in L eye.  ENT: Moist mucous membranes, no sores  NECK: Supple, No JVD  NERVOUS SYSTEM:  CN 2-12 intact b/l. Alert & Oriented X3, Motor Strength 5/5 B/L upper ext, 0/5 lower ext. Sensation intact B/L  CHEST/LUNG: Clear to auscultation bilaterally  HEART: Regular rate and rhythm, no murmur   ABDOMEN: Soft, Nontender, Nondistended, Bowel sounds present  EXTREMITIES:   No clubbing, cyanosis, or edema  MUSCULOSKELETAL: No muscle tenderness, no joint tenderness  SKIN: warm and dry, no rash. PRessure packing over wound on the back c/d/i.

## 2018-02-06 NOTE — H&P ADULT - PROBLEM SELECTOR PLAN 2
UA positive.  Follow up [facility].  Continue with ceftriaxone. UA positive.  Follow up UCx.  Continue with Vanc and Zosyn. -UA positive; no urinary symptoms; possible that patient is chronically colonized given self catherization.    Follow up [facility].  Continue with Vanc and Zosyn as above, DELIO velásquez in am

## 2018-02-06 NOTE — H&P ADULT - NSHPREVIEWOFSYSTEMS_GEN_ALL_CORE
REVIEW OF SYSTEMS    CONSTITUTIONAL:  Denies f nvd chills  HEENT:  Eyes:  Denies visual loss, blurred vision, double vision or scleral icterus. Ears, Nose, Throat:  Denies hearing loss, sneezing, congestion, runny nose or sore throat.  SKIN:  Denies rash or itching.  CARDIOVASCULAR:  Denies chest pain, SUMMER  RESPIRATORY:  Denies shortness of breath, cough or sputum.  GASTROINTESTINAL:  Denies anorexia, nausea, vomiting or diarrhea. No abdominal pain or blood.  GENITOURINARY:  Denies any hematuria. Self catheterizes.  NEUROLOGICAL:  Denies headache, dizziness, syncope, paralysis, ataxia, numbness or tingling in the extremities.  MUSCULOSKELETAL:  Denies muscle, joint pain or stiffness. + leg cramps, resolve with baclofen.  HEMATOLOGIC:  Denies anemia, bleeding or bruising.  LYMPHATICS:  Denies enlarged nodes.   PSYCHIATRIC:  Denies history of depression or anxiety.  ENDOCRINOLOGIC:  Denies reports of sweating, cold or heat intolerance. No polyuria or polydipsia.  ALLERGIES:  Denies history of asthma, hives, eczema or rhinitis.

## 2018-02-06 NOTE — H&P ADULT - ATTENDING COMMENTS
Pt seen and examined at bedside.  I have precepted this case with house staff and agree with resident note above and have edited it where appropriate.  Care to be assumed by day hospitalist in am  This patient was assigned to me by the hospitalist in charge; my involvement in this case has consisted of the initial history, physical, chart review, and management plan.  This patient was previously unknown to me.

## 2018-02-07 LAB
-  AMIKACIN: SIGNIFICANT CHANGE UP
-  AMPICILLIN/SULBACTAM: SIGNIFICANT CHANGE UP
-  AMPICILLIN: SIGNIFICANT CHANGE UP
-  AZTREONAM: SIGNIFICANT CHANGE UP
-  CEFAZOLIN: SIGNIFICANT CHANGE UP
-  CEFEPIME: SIGNIFICANT CHANGE UP
-  CEFOXITIN: SIGNIFICANT CHANGE UP
-  CEFTAZIDIME: SIGNIFICANT CHANGE UP
-  CEFTRIAXONE: SIGNIFICANT CHANGE UP
-  CIPROFLOXACIN: SIGNIFICANT CHANGE UP
-  ERTAPENEM: SIGNIFICANT CHANGE UP
-  GENTAMICIN: SIGNIFICANT CHANGE UP
-  IMIPENEM: SIGNIFICANT CHANGE UP
-  LEVOFLOXACIN: SIGNIFICANT CHANGE UP
-  MEROPENEM: SIGNIFICANT CHANGE UP
-  NITROFURANTOIN: SIGNIFICANT CHANGE UP
-  PIPERACILLIN/TAZOBACTAM: SIGNIFICANT CHANGE UP
-  TOBRAMYCIN: SIGNIFICANT CHANGE UP
-  TRIMETHOPRIM/SULFAMETHOXAZOLE: SIGNIFICANT CHANGE UP
ANION GAP SERPL CALC-SCNC: 12 MMOL/L — SIGNIFICANT CHANGE UP (ref 5–17)
BUN SERPL-MCNC: 14 MG/DL — SIGNIFICANT CHANGE UP (ref 7–23)
CALCIUM SERPL-MCNC: 9.3 MG/DL — SIGNIFICANT CHANGE UP (ref 8.4–10.5)
CHLORIDE SERPL-SCNC: 104 MMOL/L — SIGNIFICANT CHANGE UP (ref 96–108)
CO2 SERPL-SCNC: 26 MMOL/L — SIGNIFICANT CHANGE UP (ref 22–31)
CREAT SERPL-MCNC: 0.35 MG/DL — LOW (ref 0.5–1.3)
GLUCOSE SERPL-MCNC: 102 MG/DL — HIGH (ref 70–99)
HCT VFR BLD CALC: 30.7 % — LOW (ref 34.5–45)
HGB BLD-MCNC: 10.3 G/DL — LOW (ref 11.5–15.5)
MAGNESIUM SERPL-MCNC: 1.9 MG/DL — SIGNIFICANT CHANGE UP (ref 1.6–2.6)
MCHC RBC-ENTMCNC: 29.7 PG — SIGNIFICANT CHANGE UP (ref 27–34)
MCHC RBC-ENTMCNC: 33.6 GM/DL — SIGNIFICANT CHANGE UP (ref 32–36)
MCV RBC AUTO: 88.4 FL — SIGNIFICANT CHANGE UP (ref 80–100)
METHOD TYPE: SIGNIFICANT CHANGE UP
PHOSPHATE SERPL-MCNC: 3.2 MG/DL — SIGNIFICANT CHANGE UP (ref 2.5–4.5)
PLATELET # BLD AUTO: 272 K/UL — SIGNIFICANT CHANGE UP (ref 150–400)
POTASSIUM SERPL-MCNC: 4 MMOL/L — SIGNIFICANT CHANGE UP (ref 3.5–5.3)
POTASSIUM SERPL-SCNC: 4 MMOL/L — SIGNIFICANT CHANGE UP (ref 3.5–5.3)
RBC # BLD: 3.48 M/UL — LOW (ref 3.8–5.2)
RBC # FLD: 13.2 % — SIGNIFICANT CHANGE UP (ref 10.3–14.5)
SODIUM SERPL-SCNC: 142 MMOL/L — SIGNIFICANT CHANGE UP (ref 135–145)
WBC # BLD: 4.8 K/UL — SIGNIFICANT CHANGE UP (ref 3.8–10.5)
WBC # FLD AUTO: 4.8 K/UL — SIGNIFICANT CHANGE UP (ref 3.8–10.5)

## 2018-02-07 PROCEDURE — 99233 SBSQ HOSP IP/OBS HIGH 50: CPT

## 2018-02-07 PROCEDURE — 99232 SBSQ HOSP IP/OBS MODERATE 35: CPT

## 2018-02-07 PROCEDURE — 70551 MRI BRAIN STEM W/O DYE: CPT | Mod: 26

## 2018-02-07 RX ORDER — ACETAMINOPHEN 500 MG
650 TABLET ORAL ONCE
Qty: 0 | Refills: 0 | Status: COMPLETED | OUTPATIENT
Start: 2018-02-07 | End: 2018-02-07

## 2018-02-07 RX ORDER — ACETAMINOPHEN 500 MG
1000 TABLET ORAL ONCE
Qty: 0 | Refills: 0 | Status: DISCONTINUED | OUTPATIENT
Start: 2018-02-07 | End: 2018-02-07

## 2018-02-07 RX ADMIN — DORNASE ALFA 2.5 MILLIGRAM(S): 1 SOLUTION RESPIRATORY (INHALATION) at 06:32

## 2018-02-07 RX ADMIN — Medication 20 MILLIGRAM(S): at 17:34

## 2018-02-07 RX ADMIN — Medication 1 MILLIGRAM(S): at 11:50

## 2018-02-07 RX ADMIN — OXYCODONE AND ACETAMINOPHEN 1 TABLET(S): 5; 325 TABLET ORAL at 06:27

## 2018-02-07 RX ADMIN — Medication 100 MILLIGRAM(S): at 17:34

## 2018-02-07 RX ADMIN — Medication 1 DROP(S): at 11:51

## 2018-02-07 RX ADMIN — Medication 650 MILLIGRAM(S): at 14:20

## 2018-02-07 RX ADMIN — Medication 1 DROP(S): at 06:28

## 2018-02-07 RX ADMIN — Medication 20 MILLIGRAM(S): at 11:50

## 2018-02-07 RX ADMIN — Medication 500 MILLIGRAM(S): at 11:50

## 2018-02-07 RX ADMIN — Medication 260 MILLIGRAM(S): at 13:50

## 2018-02-07 RX ADMIN — ENOXAPARIN SODIUM 40 MILLIGRAM(S): 100 INJECTION SUBCUTANEOUS at 11:49

## 2018-02-07 RX ADMIN — OXYCODONE AND ACETAMINOPHEN 1 TABLET(S): 5; 325 TABLET ORAL at 17:29

## 2018-02-07 RX ADMIN — PREGABALIN 500 MICROGRAM(S): 225 CAPSULE ORAL at 11:50

## 2018-02-07 RX ADMIN — OXYCODONE AND ACETAMINOPHEN 1 TABLET(S): 5; 325 TABLET ORAL at 18:30

## 2018-02-07 RX ADMIN — Medication 100 MILLIGRAM(S): at 06:28

## 2018-02-07 RX ADMIN — OXYCODONE AND ACETAMINOPHEN 1 TABLET(S): 5; 325 TABLET ORAL at 05:57

## 2018-02-07 RX ADMIN — Medication 20 MILLIGRAM(S): at 06:29

## 2018-02-07 RX ADMIN — OXYCODONE AND ACETAMINOPHEN 1 TABLET(S): 5; 325 TABLET ORAL at 00:16

## 2018-02-07 RX ADMIN — DULOXETINE HYDROCHLORIDE 20 MILLIGRAM(S): 30 CAPSULE, DELAYED RELEASE ORAL at 11:50

## 2018-02-07 RX ADMIN — OXYCODONE AND ACETAMINOPHEN 1 TABLET(S): 5; 325 TABLET ORAL at 12:20

## 2018-02-07 RX ADMIN — DORNASE ALFA 2.5 MILLIGRAM(S): 1 SOLUTION RESPIRATORY (INHALATION) at 18:05

## 2018-02-07 RX ADMIN — Medication 1 DROP(S): at 17:34

## 2018-02-07 RX ADMIN — OXYCODONE AND ACETAMINOPHEN 1 TABLET(S): 5; 325 TABLET ORAL at 11:50

## 2018-02-07 RX ADMIN — Medication 1000 UNIT(S): at 11:50

## 2018-02-07 NOTE — DIETITIAN INITIAL EVALUATION ADULT. - NS AS NUTRI INTERV ED CONTENT
Discussed with pt the importance of po intake with a focus on protein from HBV sources, consumption of meals/supplements to meet increased nutrient needs, optimization of intake during periods of increased appetite, and consuming small frequent healthy balanced meals. RD remains available as needed and per follow-up protocol. Ilene Nichole MS, RDN, CDN Pager # [phone]

## 2018-02-07 NOTE — PHYSICAL THERAPY INITIAL EVALUATION ADULT - ACTIVE RANGE OF MOTION EXAMINATION, REHAB EVAL
Right UE Active ROM was WFL (within functional limits)/deficits as listed below/L hand wrist drop, as well as bilat knees lacking last 5-10 degrees of extension and ankles in plantar flexion

## 2018-02-07 NOTE — PHYSICAL THERAPY INITIAL EVALUATION ADULT - RANGE OF MOTION EXAMINATION, REHAB EVAL
Right UE ROM was WFL (within functional limits)/bilateral upper extremity ROM was WFL (within functional limits)/deficits as listed below/Left LE ROM was WFL (within functional limits)/L hand wrist drop, as well as bilat knees lacking last 5-10 degrees of extension and ankles in plantar flexion

## 2018-02-07 NOTE — PROGRESS NOTE ADULT - PROBLEM SELECTOR PLAN 1
-Unclear etiology? clinically no sign of infection-no symptoms, no leukocytosis  Patient reports ingestion of a " mushroom supplement" 2 days ago given by son   asked to get the supplement from home to check ingredients.   -reports drinking 7 cups of tea, possibly contributing to tachycardia  Appreciate cardiology recs.  -Monitor off abx  -ID recs appreciated

## 2018-02-07 NOTE — DIETITIAN INITIAL EVALUATION ADULT. - PROBLEM SELECTOR PLAN 4
-Per allscripts: The patient is seen by Dr. Norris of [facility] and [facility] as an o/p, who are aware of the aortic/carotid/infrarenal aortic dissection, and has documented that it is stable on 2/2/18, and are recommending repeat CTA in 01/2019 in about 1 year's time.  -Pulses palpable distally, and patient is hemodynamically stable currently.

## 2018-02-07 NOTE — PHYSICAL THERAPY INITIAL EVALUATION ADULT - PERTINENT HX OF CURRENT PROBLEM, REHAB EVAL
66 yo F with a PMH of HTN, HLD, undiagnosed rheumatological disorder in 2007, Type A aortic thoracic dissection 5/2009 s/p repair, s/p descending aortic aneurysm repair 09/2016, spontaneous subdural spinal cord hemorrhage s/p drainage 08/2014 with 2 dethethering of the spinal cord procedures c/p paraplegia now wheelchair bound with multiple UTIs

## 2018-02-07 NOTE — CONSULT NOTE ADULT - ASSESSMENT
A/P:    68 yo F with a PMH of HTN, HLD, undiagnosed rheumatological disorder in 2007, Type A aortic thoracic dissection 5/2009 s/p repair, s/p descending aortic aneurysm repair 09/2016, spontaneous subdural spinal cord hemorrhage s/p drainage 08/2014 with 2 dethethering of the spinal cord procedures c/p paraplegia now wheelchair bound with multiple UTIs in the setting of self catheterization sent in by her cardiologist for tachycardia in the setting of possible UTI and PNA/or recent ingestion of supplements containing "mushroom".   Sacral Stage IV  Pressure ulcer- Pack w/ Aquacel     con't Nutrition (as tolerated)  con't Offloading   con't Pericare  Care as per medicine will follow w/ you  Upon discharge, f/[facility] outpatient at [facility] [address] [place] [address] [phone]    known to [facility] and may return there as it's closer to her home  Seen w/ attng and D/w team  Thank you for this consult  Kimberly Mueller PA-C CWS [id number] A/P:    66 yo F with a PMH of HTN, HLD, undiagnosed rheumatological disorder in 2007, Type A aortic thoracic dissection 5/2009 s/p repair, s/p descending aortic aneurysm repair 09/2016, spontaneous subdural spinal cord hemorrhage s/p drainage 08/2014 with 2 dethethering of the spinal cord procedures c/p paraplegia now wheelchair bound with multiple UTIs in the setting of self catheterization sent in by her cardiologist for tachycardia in the setting of possible UTI and PNA/or recent ingestion of supplements containing "mushroom".   Sacral Stage IV  Pressure ulcer- Pack w/ Aquacel   Consider MRI to eval for Osteo on chronic Sacral pressure ulcer- CT not best dx tool  con't Nutrition (as tolerated)  con't Offloading   con't Pericare  Care as per medicine will follow w/ you  Upon discharge, f/u as outpatient at [facility] [address] [place] [address] [phone]-   pt known to [facility] and may return there as it's closer to her home  Seen w/ attng and D/w team  Thank you for this consult  Kimberly Mueller PA-C CWS [id number]

## 2018-02-07 NOTE — DIETITIAN INITIAL EVALUATION ADULT. - PROBLEM SELECTOR PLAN 1
-Unclear etiology; sinus tach on EKG; pt afebrile, no leukocytosis, hemodynamically stable.   -Could be related to poor PO intake and dehydration, as tachycardia has resolved with administration of fluids. Infection, such as a UTI or PNA may also be an contributing factor.  -Tree in bud opacities in the lung may also represent PNA, especially with history of MRSA PNA treated with Vancomycin, but have unclear significance at this time as the patient is afebrile, HDS and does not have a white count. Furthermore, patient does not endorse overt respiratory sx, and has no significant hypoxia on vitals; however, pt presented in similar fashion without overt symptomology in 2016 and was treated with 10 day course of Linezolid, with recent prior CTA not mentioning this current noted mucous impaction/TIB opacities.   -Will need an ID consultation in AM for guidance as to if bronchoscopy is necessary.  -Will obtain BCx at this time. Chest PT, incentive spirometry. Send urine legionella.  -start zosyn for broad spectrum empiric coverage along with above mentioned vancomycin; prior culture data reviewed; recent UCx with E faecalis sens to vanco, prior ecoli resistant to fluoroquinolones otherwise pansensitive, and prior sputum culture with MRSA sensitive to vanco.  -Thus, at this time, plan to continue with empiric abx and f/u infectious w/u and narrow or d/c abx if able in next few days.

## 2018-02-07 NOTE — DIETITIAN INITIAL EVALUATION ADULT. - OTHER INFO
Pt seen for pressure injury stage 4 consult on 3DSU. Wt loss noted above. Pt reports good appetite in house, however, disliked DASH/TLC diet. Therefore, provider changed diet to regular. Pt reports she is excited to eat "real" food. Pt amenable to oral nutrition supplements of Ensure Enlive and Benny. Pt denies chewing/swallowing difficulty, N+V, diarrhea, constipation. Last BM 2/7. Pt seen for pressure injury stage 4 consult/BMI <19kg/m2 on 3DSU. Wt loss noted above. Pt reports good appetite in house, however, disliked DASH/TLC diet. Therefore, provider changed diet to regular. Pt reports she is excited to eat "real" food. Pt amenable to oral nutrition supplements of Ensure Enlive and Benny. Pt denies chewing/swallowing difficulty, N+V, diarrhea, constipation. Last BM 2/7.

## 2018-02-07 NOTE — CHART NOTE - NSCHARTNOTEFT_GEN_A_CORE
Upon Nutritional Assessment by the Registered Dietitian your patient was determined to meet criteria / has evidence of the following diagnosis/diagnoses:          [ ]  Mild Protein Calorie Malnutrition        [ ]  Moderate Protein Calorie Malnutrition        [X] Severe Protein Calorie Malnutrition        [ ] Unspecified Protein Calorie Malnutrition        [X] Underweight / BMI <19        [ ] Morbid Obesity / BMI > 40      Findings as based on:  [X] Comprehensive nutrition assessment   [X] Nutrition Focused Physical Exam:  Findings include severe triceps, fat overlying ribs fat loss and severe temporal, clavicle, shoulders, thigh, and calf muscle mass loss.  [X] Other: 18.25% wt loss x 5 months, BMI = 17.9kg/m2      Nutrition Plan/Recommendations: Continue regular diet as medically feasible. Obtained food preferences. Recommend 3 Ensure Enlive/day (provides additional 1,050 calories, 60 grams protein) and 2 Benny packets (provides additional 160 calories, 28 grams amino acids). Discussed with pt the importance of po intake with a focus on protein from HBV sources, consumption of meals/supplements to meet increased nutrient needs, optimization of intake during periods of increased appetite, and consuming small frequent healthy balanced meals. RD remains available as needed and per follow-up protocol. Ilene Nichole MS, RDN, CDN Pager # [phone]          PROVIDER Section:     By signing this assessment you are acknowledging and agree with the diagnosis/diagnoses assigned by the Registered Dietitian    Comments:

## 2018-02-07 NOTE — PHYSICAL THERAPY INITIAL EVALUATION ADULT - PRECAUTIONS/LIMITATIONS, REHAB EVAL
in the setting of self catheterization sent in by her cardiologist for tachycardia in the setting of possible UTI and PNA/or recent ingestion of supplements containing "mushroom"./fall precautions

## 2018-02-07 NOTE — DIETITIAN INITIAL EVALUATION ADULT. - ENERGY NEEDS
ht = 62 inches, wt =98 pounds, BMI = 17.9kg/m2, IBW = 110 pounds, 89% IBW    Other Pertinent Information: Per chart, this is a 68 yo female with HTN, HLD, undiagnosed rheumatological disorder in 2007, Type A aortic thoracic dissection 5/2009 S/P repair, S/P descending aortic aneurysm repair 09/2016, spontaneous subdural spinal cord hemorrhage S/P drainage 08/2014 with 2 detethering of the spinal cord procedures c/b paraplegia now wheelchair bound with multiple UTIs in the setting of self catheterization sent in by her cardiologist for tachycardia.

## 2018-02-07 NOTE — DIETITIAN INITIAL EVALUATION ADULT. - ORAL INTAKE PTA
Pt reports poor appetite >3 months due to pain from pressure injury. Pt tries to consume small frequent meals throughout the day that include protein to help her pressure injury heal. Diet Recall: Breakfast: oatmeal with protein powder or pancakes with protein powder, water; Snack: Ensure; Lunch: turkey with Russian dressing; Snack: fruit, ice cream; Dinner: Chinese food (broccoli with garlic chicken; Snack: cookies, cheescake; NKFA per pt. Pt reports taking vitamin C, folic acid, vitamin B12, vitamin D3 and mushroom supplements at home.

## 2018-02-07 NOTE — DIETITIAN INITIAL EVALUATION ADULT. - PROBLEM SELECTOR PLAN 2
-UA positive; no urinary symptoms; possible that patient is chronically colonized given self catherization.    Follow up UCx.  Continue with Vanc and Zosyn as above, DELIO velásquez in am

## 2018-02-07 NOTE — PHYSICAL THERAPY INITIAL EVALUATION ADULT - CRITERIA FOR SKILLED THERAPEUTIC INTERVENTIONS
functional limitations in following categories/impairments found/predicted duration of therapy intervention/anticipated discharge recommendation/risk reduction/prevention/therapy frequency/rehab potential/anticipated equipment needs at discharge

## 2018-02-07 NOTE — CONSULT NOTE ADULT - ATTENDING COMMENTS
Chart reviewed.  68 yo non ambulatory, paraplegic female with a chronic sacral wound, under care of [facility].  Currently with [facility]  Incontinent  Sacral wound is related to significant sacral bony prominence, due in part , to weight loss  Offloading discussed  sacral wound dressed  Will f/u with [facility]

## 2018-02-07 NOTE — PHYSICAL THERAPY INITIAL EVALUATION ADULT - ADDITIONAL COMMENTS
As per Care notes : Patient lives with her spouse. Patients caregiver is Alexis her spouse and pvt  hire [employer]. Patient is known to [facility] for RN  wound care [facility] for anna PT, attends [facility] everyother week and plans to return to home when medically cleared. Pt at baseline is wheelchair bound, able to perform slide board transfer from bed to chair independently.

## 2018-02-07 NOTE — DIETITIAN INITIAL EVALUATION ADULT. - PHYSICAL APPEARANCE
emaciated/BMI = 17.9kg/m2, no edema, stage 4 pressure injury sacrum; NFPE performed. Findings include severe triceps, fat overlying ribs fat loss and severe temporal, clavicle, shoulders, thigh, and calf muscle mass loss.

## 2018-02-07 NOTE — PROGRESS NOTE ADULT - PROBLEM SELECTOR PLAN 2
-UA positive; no urinary symptoms; possible that patient is chronically colonized given self catheterization.    -Monitor off abx at this time

## 2018-02-07 NOTE — CONSULT NOTE ADULT - SUBJECTIVE AND OBJECTIVE BOX
Wound SURGERY CONSULT NOTE    HPI:  The patient is a 66 yo F with a PMH of HTN, HLD, undiagnosed rheumatological disorder in , Type A aortic thoracic dissection 2009 s/p repair, s/p descending aortic aneurysm repair 2016, spontaneous subdural spinal cord hemorrhage s/p drainage 2014 with 2 dethethering of the spinal cord procedures c/p paraplegia now wheelchair bound with multiple UTIs in the setting of self catheterization sent in by her cardiologist for tachycardia. The patient denies any CP, SOB, cough, F nvd, malodorous urine. States she had been treated for a UTI last week with an antibiotic she cannot recall the name of. Denies any hx of blood clots. Denies any decreased appetite or PO intake. The patient is seen by Dr. Norris of [facility] who is aware of the aortic dissection, and sees that it is stable, and is recommending repeat CTA in 2019. Additionally, the patient had MRSA PNA as confirmed via sputum induction in , which was treated with vancomycin.    Vital Signs Last 24 Hrs  T(C): 36.8 (2018 01:39), Max: 37 (2018 23:38)  T(F): 98.3 (2018 01:39), Max: 98.6 (2018 23:38)  HR: 101 (2018 01:39) (101 - 133)  BP: 164/83 (2018 01:39) (145/89 - 177/97)  BP(mean): --  RR: 17 (:39) (17 - 20)  SpO2: 96% (2018 01:39) (96% - 97%)    UA positive. CTA: mucoid plugging, no PE, and chronic various arterial dissections. Given Azithromycin and ceftriaxone, 1 L NS. (2018 02:41)      PAST MEDICAL & SURGICAL HISTORY:  Paraplegia  Aortic dissection, abdominal: Type B Watched regularly  Aortic dissection, thoracic: Type A Repaired  Blindness of left eye  Chronic constipation  UTI (urinary tract infection)  TIA (transient ischemic attack)  HTN (Hypertension)  S/P aortic dissection repair: Type A Dissection repair  H/O Spinal surgery      REVIEW OF SYSTEMS      General:	    Skin/Breast:  	  Ophthalmologic:  	  ENMT:	    Respiratory and Thorax:  	  Cardiovascular:	    Gastrointestinal:	    Genitourinary:	    Musculoskeletal:	    Neurological:	    Psychiatric:	    Hematology/Lymphatics:	    Endocrine:	    Allergic/Immunologic:	    MEDICATIONS  (STANDING):  ascorbic acid 500 milliGRAM(s) Oral daily  atorvastatin 40 milliGRAM(s) Oral at bedtime  baclofen 20 milliGRAM(s) Oral four times a day  cholecalciferol 1000 Unit(s) Oral daily  cyanocobalamin 500 MICROGram(s) Oral daily  dexamethasone/neomycin/polymyxin Suspension 1 Drop(s) Left EYE every 6 hours  dornase tony Solution 2.5 milliGRAM(s) Inhalation two times a day  DULoxetine 20 milliGRAM(s) Oral daily  enoxaparin Injectable 40 milliGRAM(s) SubCutaneous daily  folic acid 1 milliGRAM(s) Oral daily  labetalol 100 milliGRAM(s) Oral two times a day    MEDICATIONS  (PRN):  oxyCODONE    5 mG/acetaminophen 325 mG 1 Tablet(s) Oral every 4 hours PRN Severe Pain (7 - 10)      Allergies    Cipro (Rash)  Fosamax (Unknown)    SOCIAL HISTORY:  ; Denies smoking, ETOH, drugs    FAMILY HISTORY:  No pertinent family history in first degree relatives      Vital Signs Last 24 Hrs  T(C): 36.8 (2018 04:29), Max: 37.1 (2018 12:11)  T(F): 98.3 (2018 04:29), Max: 98.8 (2018 12:11)  HR: 89 (2018 04:29) (88 - 100)  BP: 173/90 (2018 04:29) (126/70 - 173/90)  BP(mean): --  RR: 18 (2018 04:29) (18 - 18)  SpO2: 98% (2018 04:29) (96% - 98%)    NAD / A&Ox3  cachectic/ WN/ WG  Versa Care P500 bed    Cardiovascular: RRR (+)m    Respiratory: CTA    Gastrointestinal soft NT/ND (+)BS    Neurology  strength & sensation grossly intact BUE  Paraplegic w/o sensation BLE    Musculoskeletal/Vascular: BUE w/FROM  BLE stiff PROM, w/ contracture of knees   BLE w/o edema, equally warm  (+) DP/PT pulses    Skin:  moist w/ good turgor  Sacral stage IV pressure ulcer- protuberant spine  base moist & granular  2.5cm x 1.5cm 1cm w/ith undermining from 12-3 o'clock w/ greatest at 12 o'clock of 3cm  (+)palpable not exposed bone   (+)serosanguinous drainage  No odor, erythema, increased warmth, tenderness, induration, fluctuance    LABS:                        10.3   4.8   )-----------( 272      ( 2018 06:12 )             30.7     02-07    142  |  104  |  14  ----------------------------<  102<H>  4.0   |  26  |  0.35<L>    Ca    9.3      2018 06:12  Phos  3.2     -  Mg     1.9     -    TPro  6.7  /  Alb  3.7  /  TBili  0.4  /  DBili  x   /  AST  14  /  ALT  10  /  AlkPhos  112  -    PT/INR - ( 2018 16:32 )   PT: 12.8 sec;   INR: 1.18 ratio    PTT - ( 2018 16:32 )  PTT:34.5 sec        Urinalysis Basic - ( 2018 16:15 )    Color: Yellow / Appearance: SL Turbid / S.015 / pH: x  Gluc: x / Ketone: Negative  / Bili: Negative / Urobili: Negative   Blood: x / Protein: 30 mg/dL / Nitrite: Positive   Leuk Esterase: Small / RBC: 0-2 /HPF / WBC 10-25 /HPF   Sq Epi: x / Non Sq Epi: x / Bacteria: Few /HPF        RADIOLOGY & ADDITIONAL STUDIES:  < from: CT Angio Chest w/ IV Cont (18 @ 20:36) >  FINDINGS:    CHEST:     LUNGS AND LARGE AIRWAYS: Patent central airways. Emphysema.  Mcoud impacted rightupper, right lower and left lower lobe airways.   Tubular opacities in the right upper lobe along the minor and major   fissures that likely represent mucoid impacted airways. Tree-in-bud   opacities in the bilateral lower lobes, right greater than left.   PLEURA: No pleural effusion.  VESSELS: Dissection of the aortic arch/brachiocephalic artery extending   to the visualized right common carotid artery. Prior graft repair of the   thoracic aorta. No evidence of pulmonary embolism.  HEART: Heart size is normal. No pericardial effusion.  MEDIASTINUM AND NAWAF: No lymphadenopathy.  CHEST WALL AND LOWER NECK: Diffusely enlarged and heterogeneous thyroid   gland.  VISUALIZED UPPER ABDOMEN: Within normal limits.  BONES: Within normal limits.    IMPRESSION:     Dissection of the aortic arch/brachiocephalic artery extending to the   visualized right common carotid artery which is unchanged when compared   to 2018.     No evidence of pulmonary embolism.    Mucoid impacted airways as described above.           CT Angio Abdomen and Pelvis w/ IV Cont (17 @ 15:20) >  FINDINGS:    Vessels: Partially visualized prior ascending thoracic aortic repair.   Persistent dissection flap seen through the entirety of the abdominal   aorta extending into the bilateral common iliac arteries and left   proximal external iliac artery. There is patency of the tissue and false   lumens. Focal saccular-like aneurysmal dilatation of the true lumen just   distal to the takeoff of the renal artery measuring 3.9 x 1.7 cm new from   the prior exam. Suprarenal abdominal aorta just distal to the   diaphragmatic hiatus measures 4.2 x 3.8 cm, series 10 image 32 unchanged.   Bilateral renal arteries are widely patent. Mid abdominal aorta measures   3.3 x 3.2 cm, series 10 image 57 unchanged. Distal abdominal aorta just   proximal to the bifurcation measuring 3.3 x 2.9cm, series 10 image 69   unchanged. Right common iliac artery measures 2.4 cm unchanged. Left   common iliac artery measures 1.8 cm unchanged. Diffuse mild   atherosclerotic changes. IVC filter is visualized. The celiac axis,   superior mesenteric artery, inferior mesenteric artery and bilateral   renal arteries emanate from the true lumen anteriorly. High grade disease   at the origin the celiac axis. Superior mesenteric artery and inferior   mesenteric artery are widely patent.    Visualized lung bases: Heart is mildly enlarged. Subcentimeter nodularity   at the bilateral lung bases new from prior exam may represent infectious   or inflammatory process, clinically correlate and follow-up CT in 4-6   weeks. Trace to small left pleural effusion with minimal left basilar   atelectasis. Calcified pleural plaques along the left hemiabdomen thorax.    Liver: Subcentimeter hypodensity in the inferior right hepatic lobe too   small to characterize unchanged.    Gallbladder: Mildly distended. Nogallbladder wall thickening. No   radiopaque calculi..    Bile ducts: Mild intrahepatic biliary dilatation. Common bile duct is   mildly dilated measuring up to 0.8 cm. No definite mass or calculus   identified. Correlate with liver function test.  Pancreas: within normal limits    Spleen: within normal limits  Adrenal: within normal limits    Kidneys, Ureters and Bladder:: Kidneys enhance bilaterally and   symmetrically without hydronephrosis. Subcentimeter hypodensities   bilaterally to small to characterize. No intrarenal calculi. The ureters   and bladder are within normal limits.        Pelvis: Prominent pelvic venous vasculature with enlarged gonadal veins   suggesting pelvic congestion syndrome. No pelvic or adnexal mass. No   pelvic adenopathy or pelvic free fluid.    Bowel: Moderate to large amount stool throughout the colon. The bowel is   of normal course and caliber without evidence of obstruction or gross   bowel wall thickening.        Peritoneum: No intra-abdominal free air, ascites or organized fluid   collections.    Retroperitoneum: within normal limits       Vessels: Atherosclerotic changes. See above    Abdominal wall:  within normal limits  Bones: Degenerative changes. No lytic lesions. Diffuse osteopenia.   Partially visualized prior sternotomy. Trace right hip perfusion versus   bursal fluid. Small sclerotic densities in the right iliac bone unchanged.    IMPRESSION:    Focal saccular-like aneurysmal dilatation of the true lumen just distal   to the takeoff of the renal artery measuring 3.9 x 1.7 cm new from the   prior exam.     Persistent abdominal aortic dissection extending into the bilateral   common iliac arteries with stable aneurysmal dilatation of the abdominal   aorta and bilateral common iliacarteries. Patency of the true and false   lumens with patency of the renal arteries and mesenteric arteries.    Interval development of mild intrahepatic and extra hepatic biliary   dilatation without discrete mass or calculus, recommend correlation with   liver function test and further evaluation as clinically directed.    Subcentimeter nodularity at the bilateral lung bases new from prior exam   may represent infectious or inflammatory process, clinically correlate   and follow-up CT in 4-6 weeks. Trace to small left pleural effusion with   minimal left basilar atelectasis. Calcified pleural plaques along the   left hemiabdomen thorax.      Cultures:  Culture - Blood (18 @ 08:37)    Specimen Source: .Blood Blood    Culture Results:   No growth to date.    Culture - Urine (18 @ 22:39)    Specimen Source: .Urine Catheterized    Culture Results:   >100,000 CFU/ml Klebsiella pneumoniae Wound SURGERY CONSULT NOTE    HPI:  68 yo F with a PMH of HTN, HLD, undiagnosed rheumatological disorder in , Type A aortic thoracic dissection 2009 s/p repair, s/p descending aortic aneurysm repair 2016, spontaneous subdural spinal cord hemorrhage s/p drainage 2014 with 2 dethethering of the spinal cord procedures c/p paraplegia now wheelchair bound with multiple UTIs in the setting of self catheterization sent in by her cardiologist for tachycardia. The patient denies any CP, SOB, cough, F nvd, malodorous urine. States she had been treated for a UTI last week with an antibiotic she cannot recall the name of. Denies any hx of blood clots. Denies any decreased appetite or PO intake. The patient is seen by Dr. Barriga of [facility] who is aware of the aortic dissection, and sees that it is stable, and is recommending repeat CTA in 2019. Additionally, the patient had MRSA PNA as confirmed via sputum induction in , which was treated with vancomycin.    Pt w/ sacral pressure ulcer for about 5yrs.  Pt has been going to [facility].  Pt has not been tx for osteo- she was told she didn't have a bone infection.  Wound has almost healed but is stalled.  She has had various tx such as aquacel, medihoney, VAC.  Currently they are using Talisha and Allevyn changing QOD.  Pt denies f/c/s, odor, pain, redness, increased drainage.  Pt was amenable to having wound assessed today.      PAST MEDICAL & SURGICAL HISTORY:  Paraplegia as complication to Thoracic Aneurysm Repair  Aortic dissection, abdominal: Type B Watched regularly  Aortic dissection, thoracic: Type A Repaired  Blindness of left eye  Chronic constipation  UTI (urinary tract infection)  TIA (transient ischemic attack)  Rheumatological disorder  HTN (Hypertension)  S/P aortic dissection repair: Type A Dissection repair  s/p Spinal surgery      REVIEW OF SYSTEMS  Skin:	see HPI  All other systems negative    MEDICATIONS  (STANDING):  ascorbic acid 500 milliGRAM(s) Oral daily  atorvastatin 40 milliGRAM(s) Oral at bedtime  baclofen 20 milliGRAM(s) Oral four times a day  cholecalciferol 1000 Unit(s) Oral daily  cyanocobalamin 500 MICROGram(s) Oral daily  dexamethasone/neomycin/polymyxin Suspension 1 Drop(s) Left EYE every 6 hours  dornase tony Solution 2.5 milliGRAM(s) Inhalation two times a day  DULoxetine 20 milliGRAM(s) Oral daily  enoxaparin Injectable 40 milliGRAM(s) SubCutaneous daily  folic acid 1 milliGRAM(s) Oral daily  labetalol 100 milliGRAM(s) Oral two times a day    MEDICATIONS  (PRN):  oxyCODONE    5 mG/acetaminophen 325 mG 1 Tablet(s) Oral every 4 hours PRN Severe Pain (7 - 10)      Allergies    Cipro (Rash)  Fosamax (Unknown)    SOCIAL HISTORY:  ; Denies smoking, ETOH, drugs    FAMILY HISTORY:  No pertinent family history in first degree relatives      Vital Signs Last 24 Hrs  T(C): 36.8 (2018 04:29), Max: 37.1 (2018 12:11)  T(F): 98.3 (2018 04:29), Max: 98.8 (2018 12:11)  HR: 89 (2018 04:29) (88 - 100)  BP: 173/90 (2018 04:29) (126/70 - 173/90)  BP(mean): --  RR: 18 (2018 04:29) (18 - 18)  SpO2: 98% (2018 04:29) (96% - 98%)    NAD / A&Ox3  cachectic/ WN/ WG  Versa Care P500 bed    Cardiovascular: RRR (+)m    Respiratory: CTA    Gastrointestinal soft NT/ND (+)BS    Neurology  strength & sensation grossly intact BUE  Paraplegic w/o sensation BLE    Musculoskeletal/Vascular: BUE w/FROM  BLE stiff PROM, w/ contracture of knees   BLE w/o edema, equally warm  (+) DP/PT pulses    Skin:  moist w/ good turgor  Sacral stage IV pressure ulcer- protuberant spine  base moist & granular  2.5cm x 1.5cm 1cm w/ith undermining from 12-3 o'clock w/ greatest at 12 o'clock of 3cm  (+)palpable not exposed bone   (+)serosanguinous drainage  No odor, erythema, increased warmth, tenderness, induration, fluctuance    LABS:                        10.3   4.8   )-----------( 272      ( 2018 06:12 )             30.7     02-07    142  |  104  |  14  ----------------------------<  102<H>  4.0   |  26  |  0.35<L>    Ca    9.3      2018 06:12  Phos  3.2     02-  Mg     1.9     -    TPro  6.7  /  Alb  3.7  /  TBili  0.4  /  DBili  x   /  AST  14  /  ALT  10  /  AlkPhos  112  02-    PT/INR - ( 2018 16:32 )   PT: 12.8 sec;   INR: 1.18 ratio    PTT - ( 2018 16:32 )  PTT:34.5 sec        Urinalysis Basic - ( 2018 16:15 )    Color: Yellow / Appearance: SL Turbid / S.015 / pH: x  Gluc: x / Ketone: Negative  / Bili: Negative / Urobili: Negative   Blood: x / Protein: 30 mg/dL / Nitrite: Positive   Leuk Esterase: Small / RBC: 0-2 /HPF / WBC 10-25 /HPF   Sq Epi: x / Non Sq Epi: x / Bacteria: Few /HPF        RADIOLOGY & ADDITIONAL STUDIES:  < from: CT Angio Chest w/ IV Cont (18 @ 20:36) >  FINDINGS:    CHEST:     LUNGS AND LARGE AIRWAYS: Patent central airways. Emphysema.  Mcoud impacted rightupper, right lower and left lower lobe airways.   Tubular opacities in the right upper lobe along the minor and major   fissures that likely represent mucoid impacted airways. Tree-in-bud   opacities in the bilateral lower lobes, right greater than left.   PLEURA: No pleural effusion.  VESSELS: Dissection of the aortic arch/brachiocephalic artery extending   to the visualized right common carotid artery. Prior graft repair of the   thoracic aorta. No evidence of pulmonary embolism.  HEART: Heart size is normal. No pericardial effusion.  MEDIASTINUM AND NAWAF: No lymphadenopathy.  CHEST WALL AND LOWER NECK: Diffusely enlarged and heterogeneous thyroid   gland.  VISUALIZED UPPER ABDOMEN: Within normal limits.  BONES: Within normal limits.    IMPRESSION:     Dissection of the aortic arch/brachiocephalic artery extending to the   visualized right common carotid artery which is unchanged when compared   to 2018.     No evidence of pulmonary embolism.    Mucoid impacted airways as described above.           CT Angio Abdomen and Pelvis w/ IV Cont (12.06.17 @ 15:20) >  FINDINGS:    Vessels: Partially visualized prior ascending thoracic aortic repair.   Persistent dissection flap seen through the entirety of the abdominal   aorta extending into the bilateral common iliac arteries and left   proximal external iliac artery. There is patency of the tissue and false   lumens. Focal saccular-like aneurysmal dilatation of the true lumen just   distal to the takeoff of the renal artery measuring 3.9 x 1.7 cm new from   the prior exam. Suprarenal abdominal aorta just distal to the   diaphragmatic hiatus measures 4.2 x 3.8 cm, series 10 image 32 unchanged.   Bilateral renal arteries are widely patent. Mid abdominal aorta measures   3.3 x 3.2 cm, series 10 image 57 unchanged. Distal abdominal aorta just   proximal to the bifurcation measuring 3.3 x 2.9cm, series 10 image 69   unchanged. Right common iliac artery measures 2.4 cm unchanged. Left   common iliac artery measures 1.8 cm unchanged. Diffuse mild   atherosclerotic changes. IVC filter is visualized. The celiac axis,   superior mesenteric artery, inferior mesenteric artery and bilateral   renal arteries emanate from the true lumen anteriorly. High grade disease   at the origin the celiac axis. Superior mesenteric artery and inferior   mesenteric artery are widely patent.    Visualized lung bases: Heart is mildly enlarged. Subcentimeter nodularity   at the bilateral lung bases new from prior exam may represent infectious   or inflammatory process, clinically correlate and follow-up CT in 4-6   weeks. Trace to small left pleural effusion with minimal left basilar   atelectasis. Calcified pleural plaques along the left hemiabdomen thorax.    Liver: Subcentimeter hypodensity in the inferior right hepatic lobe too   small to characterize unchanged.    Gallbladder: Mildly distended. Nogallbladder wall thickening. No   radiopaque calculi..    Bile ducts: Mild intrahepatic biliary dilatation. Common bile duct is   mildly dilated measuring up to 0.8 cm. No definite mass or calculus   identified. Correlate with liver function test.  Pancreas: within normal limits    Spleen: within normal limits  Adrenal: within normal limits    Kidneys, Ureters and Bladder:: Kidneys enhance bilaterally and   symmetrically without hydronephrosis. Subcentimeter hypodensities   bilaterally to small to characterize. No intrarenal calculi. The ureters   and bladder are within normal limits.        Pelvis: Prominent pelvic venous vasculature with enlarged gonadal veins   suggesting pelvic congestion syndrome. No pelvic or adnexal mass. No   pelvic adenopathy or pelvic free fluid.    Bowel: Moderate to large amount stool throughout the colon. The bowel is   of normal course and caliber without evidence of obstruction or gross   bowel wall thickening.        Peritoneum: No intra-abdominal free air, ascites or organized fluid   collections.    Retroperitoneum: within normal limits       Vessels: Atherosclerotic changes. See above    Abdominal wall:  within normal limits  Bones: Degenerative changes. No lytic lesions. Diffuse osteopenia.   Partially visualized prior sternotomy. Trace right hip perfusion versus   bursal fluid. Small sclerotic densities in the right iliac bone unchanged.    IMPRESSION:    Focal saccular-like aneurysmal dilatation of the true lumen just distal   to the takeoff of the renal artery measuring 3.9 x 1.7 cm new from the   prior exam.     Persistent abdominal aortic dissection extending into the bilateral   common iliac arteries with stable aneurysmal dilatation of the abdominal   aorta and bilateral common iliacarteries. Patency of the true and false   lumens with patency of the renal arteries and mesenteric arteries.    Interval development of mild intrahepatic and extra hepatic biliary   dilatation without discrete mass or calculus, recommend correlation with   liver function test and further evaluation as clinically directed.    Subcentimeter nodularity at the bilateral lung bases new from prior exam   may represent infectious or inflammatory process, clinically correlate   and follow-up CT in 4-6 weeks. Trace to small left pleural effusion with   minimal left basilar atelectasis. Calcified pleural plaques along the   left hemiabdomen thorax.      Cultures:  Culture - Blood (18 @ 08:37)    Specimen Source: .Blood Blood    Culture Results:   No growth to date.    Culture - Urine (18 @ 22:39)    Specimen Source: .Urine Catheterized    Culture Results:   >100,000 CFU/ml Klebsiella pneumoniae

## 2018-02-08 ENCOUNTER — TRANSCRIPTION ENCOUNTER (OUTPATIENT)
Age: 68
End: 2018-02-08

## 2018-02-08 VITALS
TEMPERATURE: 98 F | OXYGEN SATURATION: 95 % | DIASTOLIC BLOOD PRESSURE: 56 MMHG | HEART RATE: 85 BPM | RESPIRATION RATE: 17 BRPM | SYSTOLIC BLOOD PRESSURE: 114 MMHG

## 2018-02-08 LAB
-  AMIKACIN: SIGNIFICANT CHANGE UP
-  AMPICILLIN/SULBACTAM: SIGNIFICANT CHANGE UP
-  AMPICILLIN: SIGNIFICANT CHANGE UP
-  AZTREONAM: SIGNIFICANT CHANGE UP
-  CEFAZOLIN: SIGNIFICANT CHANGE UP
-  CEFEPIME: SIGNIFICANT CHANGE UP
-  CEFOXITIN: SIGNIFICANT CHANGE UP
-  CEFTAZIDIME: SIGNIFICANT CHANGE UP
-  CEFTRIAXONE: SIGNIFICANT CHANGE UP
-  CIPROFLOXACIN: SIGNIFICANT CHANGE UP
-  ERTAPENEM: SIGNIFICANT CHANGE UP
-  GENTAMICIN: SIGNIFICANT CHANGE UP
-  IMIPENEM: SIGNIFICANT CHANGE UP
-  LEVOFLOXACIN: SIGNIFICANT CHANGE UP
-  MEROPENEM: SIGNIFICANT CHANGE UP
-  NITROFURANTOIN: SIGNIFICANT CHANGE UP
-  PIPERACILLIN/TAZOBACTAM: SIGNIFICANT CHANGE UP
-  TOBRAMYCIN: SIGNIFICANT CHANGE UP
-  TRIMETHOPRIM/SULFAMETHOXAZOLE: SIGNIFICANT CHANGE UP
ANION GAP SERPL CALC-SCNC: 7 MMOL/L — SIGNIFICANT CHANGE UP (ref 5–17)
BUN SERPL-MCNC: 15 MG/DL — SIGNIFICANT CHANGE UP (ref 7–23)
CALCIUM SERPL-MCNC: 9 MG/DL — SIGNIFICANT CHANGE UP (ref 8.4–10.5)
CHLORIDE SERPL-SCNC: 106 MMOL/L — SIGNIFICANT CHANGE UP (ref 96–108)
CHOLEST SERPL-MCNC: 141 MG/DL — SIGNIFICANT CHANGE UP (ref 10–199)
CO2 SERPL-SCNC: 29 MMOL/L — SIGNIFICANT CHANGE UP (ref 22–31)
CREAT SERPL-MCNC: 0.38 MG/DL — LOW (ref 0.5–1.3)
CULTURE RESULTS: SIGNIFICANT CHANGE UP
GLUCOSE SERPL-MCNC: 100 MG/DL — HIGH (ref 70–99)
HBA1C BLD-MCNC: 5.1 % — SIGNIFICANT CHANGE UP (ref 4–5.6)
HCT VFR BLD CALC: 28.8 % — LOW (ref 34.5–45)
HDLC SERPL-MCNC: 52 MG/DL — SIGNIFICANT CHANGE UP (ref 40–125)
HGB BLD-MCNC: 9.5 G/DL — LOW (ref 11.5–15.5)
LIPID PNL WITH DIRECT LDL SERPL: 73 MG/DL — SIGNIFICANT CHANGE UP
MAGNESIUM SERPL-MCNC: 2 MG/DL — SIGNIFICANT CHANGE UP (ref 1.6–2.6)
MCHC RBC-ENTMCNC: 29.7 PG — SIGNIFICANT CHANGE UP (ref 27–34)
MCHC RBC-ENTMCNC: 33.1 GM/DL — SIGNIFICANT CHANGE UP (ref 32–36)
MCV RBC AUTO: 89.7 FL — SIGNIFICANT CHANGE UP (ref 80–100)
METHOD TYPE: SIGNIFICANT CHANGE UP
ORGANISM # SPEC MICROSCOPIC CNT: SIGNIFICANT CHANGE UP
PHOSPHATE SERPL-MCNC: 3.2 MG/DL — SIGNIFICANT CHANGE UP (ref 2.5–4.5)
PLATELET # BLD AUTO: 251 K/UL — SIGNIFICANT CHANGE UP (ref 150–400)
POTASSIUM SERPL-MCNC: 4 MMOL/L — SIGNIFICANT CHANGE UP (ref 3.5–5.3)
POTASSIUM SERPL-SCNC: 4 MMOL/L — SIGNIFICANT CHANGE UP (ref 3.5–5.3)
RBC # BLD: 3.21 M/UL — LOW (ref 3.8–5.2)
RBC # FLD: 13.3 % — SIGNIFICANT CHANGE UP (ref 10.3–14.5)
SODIUM SERPL-SCNC: 142 MMOL/L — SIGNIFICANT CHANGE UP (ref 135–145)
SPECIMEN SOURCE: SIGNIFICANT CHANGE UP
TOTAL CHOLESTEROL/HDL RATIO MEASUREMENT: 2.7 RATIO — LOW (ref 3.3–7.1)
TRIGL SERPL-MCNC: 82 MG/DL — SIGNIFICANT CHANGE UP (ref 10–149)
WBC # BLD: 6 K/UL — SIGNIFICANT CHANGE UP (ref 3.8–10.5)
WBC # FLD AUTO: 6 K/UL — SIGNIFICANT CHANGE UP (ref 3.8–10.5)

## 2018-02-08 PROCEDURE — 80053 COMPREHEN METABOLIC PANEL: CPT

## 2018-02-08 PROCEDURE — 85379 FIBRIN DEGRADATION QUANT: CPT

## 2018-02-08 PROCEDURE — 99285 EMERGENCY DEPT VISIT HI MDM: CPT | Mod: 25

## 2018-02-08 PROCEDURE — 85014 HEMATOCRIT: CPT

## 2018-02-08 PROCEDURE — 85610 PROTHROMBIN TIME: CPT

## 2018-02-08 PROCEDURE — 94640 AIRWAY INHALATION TREATMENT: CPT

## 2018-02-08 PROCEDURE — 82803 BLOOD GASES ANY COMBINATION: CPT

## 2018-02-08 PROCEDURE — 97162 PT EVAL MOD COMPLEX 30 MIN: CPT

## 2018-02-08 PROCEDURE — 99239 HOSP IP/OBS DSCHRG MGMT >30: CPT

## 2018-02-08 PROCEDURE — 84484 ASSAY OF TROPONIN QUANT: CPT

## 2018-02-08 PROCEDURE — 82553 CREATINE MB FRACTION: CPT

## 2018-02-08 PROCEDURE — 84100 ASSAY OF PHOSPHORUS: CPT

## 2018-02-08 PROCEDURE — 93005 ELECTROCARDIOGRAM TRACING: CPT | Mod: XU

## 2018-02-08 PROCEDURE — 84132 ASSAY OF SERUM POTASSIUM: CPT

## 2018-02-08 PROCEDURE — 80048 BASIC METABOLIC PNL TOTAL CA: CPT

## 2018-02-08 PROCEDURE — 87449 NOS EACH ORGANISM AG IA: CPT

## 2018-02-08 PROCEDURE — 51701 INSERT BLADDER CATHETER: CPT

## 2018-02-08 PROCEDURE — 83036 HEMOGLOBIN GLYCOSYLATED A1C: CPT

## 2018-02-08 PROCEDURE — 87186 SC STD MICRODIL/AGAR DIL: CPT

## 2018-02-08 PROCEDURE — 96374 THER/PROPH/DIAG INJ IV PUSH: CPT | Mod: XU

## 2018-02-08 PROCEDURE — 85730 THROMBOPLASTIN TIME PARTIAL: CPT

## 2018-02-08 PROCEDURE — 82435 ASSAY OF BLOOD CHLORIDE: CPT

## 2018-02-08 PROCEDURE — 83605 ASSAY OF LACTIC ACID: CPT

## 2018-02-08 PROCEDURE — 85027 COMPLETE CBC AUTOMATED: CPT

## 2018-02-08 PROCEDURE — 87086 URINE CULTURE/COLONY COUNT: CPT

## 2018-02-08 PROCEDURE — 81001 URINALYSIS AUTO W/SCOPE: CPT

## 2018-02-08 PROCEDURE — 71275 CT ANGIOGRAPHY CHEST: CPT

## 2018-02-08 PROCEDURE — 82947 ASSAY GLUCOSE BLOOD QUANT: CPT

## 2018-02-08 PROCEDURE — 71045 X-RAY EXAM CHEST 1 VIEW: CPT

## 2018-02-08 PROCEDURE — 87040 BLOOD CULTURE FOR BACTERIA: CPT

## 2018-02-08 PROCEDURE — 84443 ASSAY THYROID STIM HORMONE: CPT

## 2018-02-08 PROCEDURE — 84295 ASSAY OF SERUM SODIUM: CPT

## 2018-02-08 PROCEDURE — 82550 ASSAY OF CK (CPK): CPT

## 2018-02-08 PROCEDURE — 96375 TX/PRO/DX INJ NEW DRUG ADDON: CPT | Mod: XU

## 2018-02-08 PROCEDURE — 82330 ASSAY OF CALCIUM: CPT

## 2018-02-08 PROCEDURE — 70551 MRI BRAIN STEM W/O DYE: CPT

## 2018-02-08 PROCEDURE — 97165 OT EVAL LOW COMPLEX 30 MIN: CPT

## 2018-02-08 PROCEDURE — 80061 LIPID PANEL: CPT

## 2018-02-08 PROCEDURE — 83735 ASSAY OF MAGNESIUM: CPT

## 2018-02-08 RX ORDER — BACLOFEN 100 %
20 POWDER (GRAM) MISCELLANEOUS
Qty: 0 | Refills: 0 | DISCHARGE
Start: 2018-02-08

## 2018-02-08 RX ORDER — BACLOFEN 100 %
1 POWDER (GRAM) MISCELLANEOUS
Qty: 0 | Refills: 0 | DISCHARGE
Start: 2018-02-08

## 2018-02-08 RX ORDER — LABETALOL HCL 100 MG
2 TABLET ORAL
Qty: 0 | Refills: 0 | COMMUNITY
Start: 2018-02-08 | End: 2018-03-09

## 2018-02-08 RX ORDER — LABETALOL HCL 100 MG
200 TABLET ORAL
Qty: 0 | Refills: 0 | Status: DISCONTINUED | OUTPATIENT
Start: 2018-02-08 | End: 2018-02-08

## 2018-02-08 RX ORDER — LABETALOL HCL 100 MG
2 TABLET ORAL
Qty: 120 | Refills: 0
Start: 2018-02-08 | End: 2018-03-09

## 2018-02-08 RX ORDER — MORPHINE SULFATE 50 MG/1
1 CAPSULE, EXTENDED RELEASE ORAL ONCE
Qty: 0 | Refills: 0 | Status: DISCONTINUED | OUTPATIENT
Start: 2018-02-08 | End: 2018-02-08

## 2018-02-08 RX ORDER — BACLOFEN 100 %
0 POWDER (GRAM) MISCELLANEOUS
Qty: 0 | Refills: 0 | DISCHARGE
Start: 2018-02-08

## 2018-02-08 RX ADMIN — DULOXETINE HYDROCHLORIDE 20 MILLIGRAM(S): 30 CAPSULE, DELAYED RELEASE ORAL at 12:41

## 2018-02-08 RX ADMIN — Medication 1 MILLIGRAM(S): at 12:41

## 2018-02-08 RX ADMIN — Medication 20 MILLIGRAM(S): at 05:23

## 2018-02-08 RX ADMIN — ENOXAPARIN SODIUM 40 MILLIGRAM(S): 100 INJECTION SUBCUTANEOUS at 12:41

## 2018-02-08 RX ADMIN — Medication 1 DROP(S): at 00:34

## 2018-02-08 RX ADMIN — OXYCODONE AND ACETAMINOPHEN 1 TABLET(S): 5; 325 TABLET ORAL at 10:29

## 2018-02-08 RX ADMIN — PREGABALIN 500 MICROGRAM(S): 225 CAPSULE ORAL at 12:41

## 2018-02-08 RX ADMIN — Medication 1 DROP(S): at 12:41

## 2018-02-08 RX ADMIN — OXYCODONE AND ACETAMINOPHEN 1 TABLET(S): 5; 325 TABLET ORAL at 11:00

## 2018-02-08 RX ADMIN — ATORVASTATIN CALCIUM 40 MILLIGRAM(S): 80 TABLET, FILM COATED ORAL at 00:34

## 2018-02-08 RX ADMIN — Medication 20 MILLIGRAM(S): at 00:34

## 2018-02-08 RX ADMIN — DORNASE ALFA 2.5 MILLIGRAM(S): 1 SOLUTION RESPIRATORY (INHALATION) at 05:23

## 2018-02-08 RX ADMIN — MORPHINE SULFATE 1 MILLIGRAM(S): 50 CAPSULE, EXTENDED RELEASE ORAL at 02:30

## 2018-02-08 RX ADMIN — Medication 1 DROP(S): at 05:23

## 2018-02-08 RX ADMIN — OXYCODONE AND ACETAMINOPHEN 1 TABLET(S): 5; 325 TABLET ORAL at 01:24

## 2018-02-08 RX ADMIN — OXYCODONE AND ACETAMINOPHEN 1 TABLET(S): 5; 325 TABLET ORAL at 00:37

## 2018-02-08 RX ADMIN — Medication 20 MILLIGRAM(S): at 12:41

## 2018-02-08 RX ADMIN — Medication 500 MILLIGRAM(S): at 12:41

## 2018-02-08 RX ADMIN — Medication 100 MILLIGRAM(S): at 05:23

## 2018-02-08 RX ADMIN — MORPHINE SULFATE 1 MILLIGRAM(S): 50 CAPSULE, EXTENDED RELEASE ORAL at 02:12

## 2018-02-08 RX ADMIN — Medication 1000 UNIT(S): at 12:41

## 2018-02-08 NOTE — DISCHARGE NOTE ADULT - MEDICATION SUMMARY - MEDICATIONS TO CHANGE
I will SWITCH the dose or number of times a day I take the medications listed below when I get home from the hospital:    labetalol 200 mg oral tablet  -- 2 tab(s) by mouth every 8 hours I will SWITCH the dose or number of times a day I take the medications listed below when I get home from the hospital:    oxyCODONE 7.5 mg oral tablet  -- 1 tab(s) by mouth every 8 hours, As Needed    labetalol 200 mg oral tablet  -- 2 tab(s) by mouth every 8 hours    baclofen  -- 20 milligram(s) by mouth 3 times a day

## 2018-02-08 NOTE — DISCHARGE NOTE ADULT - MEDICATION SUMMARY - MEDICATIONS TO STOP TAKING
I will STOP taking the medications listed below when I get home from the hospital:    methadone 5 mg oral tablet  -- 1 tab(s) by mouth every 12 hours    Bactrim  mg-160 mg oral tablet  -- 1 tab(s) by mouth every 12 hours  -- Avoid prolonged or excessive exposure to direct and/or artificial sunlight while taking this medication.  Finish all this medication unless otherwise directed by prescriber.  Medication should be taken with plenty of water.    cefpodoxime 100 mg oral tablet  -- 1 tab(s) by mouth 2 times a day   -- Finish all this medication unless otherwise directed by prescriber.  Take with food or milk.    Macrobid 100 mg oral capsule  -- 1 cap(s) by mouth 2 times a day   -- Finish all this medication unless otherwise directed by prescriber.  May discolor urine or feces.  Take with food or milk.    cefpodoxime 100 mg oral tablet  -- 1 tab(s) by mouth every 12 hours   -- Finish all this medication unless otherwise directed by prescriber.  Take with food or milk. I will STOP taking the medications listed below when I get home from the hospital:    Flomax 0.4 mg oral capsule  -- 1 cap(s) by mouth once a day    gabapentin 400 mg oral capsule  -- 1 cap(s) by mouth 3 times a day    amLODIPine 5 mg oral tablet  -- 1 tab(s) by mouth once a day    methadone 5 mg oral tablet  -- 1 tab(s) by mouth every 12 hours    Bactrim  mg-160 mg oral tablet  -- 1 tab(s) by mouth every 12 hours  -- Avoid prolonged or excessive exposure to direct and/or artificial sunlight while taking this medication.  Finish all this medication unless otherwise directed by prescriber.  Medication should be taken with plenty of water.    cefpodoxime 100 mg oral tablet  -- 1 tab(s) by mouth 2 times a day   -- Finish all this medication unless otherwise directed by prescriber.  Take with food or milk.    Macrobid 100 mg oral capsule  -- 1 cap(s) by mouth 2 times a day   -- Finish all this medication unless otherwise directed by prescriber.  May discolor urine or feces.  Take with food or milk.    cefpodoxime 100 mg oral tablet  -- 1 tab(s) by mouth every 12 hours   -- Finish all this medication unless otherwise directed by prescriber.  Take with food or milk.

## 2018-02-08 NOTE — PROGRESS NOTE ADULT - PROBLEM SELECTOR PLAN 1
-Unclear etiology? clinically no sign of infection-no symptoms, no leukocytosis  Patient reports ingestion of a " mushroom supplement" 2 days ago given by son  -reports drinking 7 cups of tea, possibly contributing to tachycardia  Appreciate cardiology recs.  -Monitor off abx  -ID recs appreciated  -Labetalol increased to 200mg bid

## 2018-02-08 NOTE — PROGRESS NOTE ADULT - PROBLEM SELECTOR PLAN 6
DvtL Lovenox  Dispo: PT  Diet DASH

## 2018-02-08 NOTE — DISCHARGE NOTE ADULT - PLAN OF CARE
Resolved in SR now please follow up, with DR Branham please follow up with Wound care center Follow up with your medical doctor to establish long term blood pressure treatment goals. please follow up with PCP   Home PT

## 2018-02-08 NOTE — PROGRESS NOTE ADULT - PROBLEM SELECTOR PROBLEM 5
Decubitus ulcer of back, unspecified ulcer stage

## 2018-02-08 NOTE — DISCHARGE NOTE ADULT - CARE PROVIDER_API CALL
Basil Branham), Cardiovascular Disease; Internal Medicine  [address] [facility]  [address]  [place] [address]  Phone: [phone]  Fax: [phone]    Belinda Tom), [facility]; Internal Medicine  [address] [address]  Phone: [phone]  Fax: [phone]    Nicholas Rodrigues (DO), Neurology; Vascular Neurology  [address] [facility] [address]  [place] [address]  Phone: [phone]  Fax: [phone]

## 2018-02-08 NOTE — PROGRESS NOTE ADULT - SUBJECTIVE AND OBJECTIVE BOX
CC: F/U tachycardia    Interval History/ROS: Patient feels well today. Has no complaints. Denies fever, chills. Does have feeling when she urinates and denies burning. Denies coughing, N/V/D/C.    Allergies  Cipro (Rash)  Fosamax (Unknown)    ANTIMICROBIALS:      OTHER MEDS:  ascorbic acid 500 milliGRAM(s) Oral daily  atorvastatin 40 milliGRAM(s) Oral at bedtime  baclofen 20 milliGRAM(s) Oral four times a day  cholecalciferol 1000 Unit(s) Oral daily  cyanocobalamin 500 MICROGram(s) Oral daily  dexamethasone/neomycin/polymyxin Suspension 1 Drop(s) Left EYE every 6 hours  dornase tony Solution 2.5 milliGRAM(s) Inhalation two times a day  DULoxetine 20 milliGRAM(s) Oral daily  enoxaparin Injectable 40 milliGRAM(s) SubCutaneous daily  folic acid 1 milliGRAM(s) Oral daily  labetalol 100 milliGRAM(s) Oral two times a day  oxyCODONE    5 mG/acetaminophen 325 mG 1 Tablet(s) Oral every 4 hours PRN      PE:    Vital Signs Last 24 Hrs  T(C): 36.8 (2018 04:29), Max: 37.1 (2018 12:11)  T(F): 98.3 (2018 04:29), Max: 98.8 (2018 12:11)  HR: 89 (2018 04:29) (88 - 100)  BP: 173/90 (2018 04:29) (126/70 - 173/90)  BP(mean): --  RR: 18 (2018 04:29) (18 - 18)  SpO2: 98% (2018 04:29) (96% - 98%)    Gen: AOx3, NAD, non-toxic, pleasant  CV: S1+S2 normal, no murmurs  Resp: Clear bilat, no resp distress  Abd: Soft, nontender, +BS  Ext: No LE edema  : +Russo  IV/Skin: No thrombophlebitis, sacral decub with dressing  Msk: No low back pain, no arthralgias, no joint swelling  Neuro: bilateral lower extremity weakness      LABS:                          10.3   4.8   )-----------( 272      ( 2018 06:12 )             30.7       02-    142  |  104  |  14  ----------------------------<  102<H>  4.0   |  26  |  0.35<L>    Ca    9.3      2018 06:12  Phos  3.2     -  Mg     1.9         TPro  6.7  /  Alb  3.7  /  TBili  0.4  /  DBili  x   /  AST  14  /  ALT  10  /  AlkPhos  112        Urinalysis Basic - ( 2018 16:15 )    Color: Yellow / Appearance: SL Turbid / S.015 / pH: x  Gluc: x / Ketone: Negative  / Bili: Negative / Urobili: Negative   Blood: x / Protein: 30 mg/dL / Nitrite: Positive   Leuk Esterase: Small / RBC: 0-2 /HPF / WBC 10-25 /HPF   Sq Epi: x / Non Sq Epi: x / Bacteria: Few /HPF        MICROBIOLOGY:  v  .Blood Blood  18   No growth to date.  --  --      .Blood Blood-Peripheral  18   No growth to date.  --  --      .Urine Catheterized  18   >100,000 CFU/ml Klebsiella pneumoniae  --  --    RADIOLOGY:    No new images.
HPI: (consult in paper chart)  Pt is a 68 yo female unknown rheumatologic disorder  was on high dose steroids, s/p Type A dissection repair  , s/p TIA , UGI bleed  s/p spontaneous subdural spinal cord hemorrhage , s/p 2 detethering procedures leaving pt mostly wheelchair bound, s/p thoracic aorta aneurysm repair , s/p hospitalization  for hypotension (labetelol and amlodipine dc'd), admitted for sinus tachycardia to 135 yesterday.  Pt with recurrent UTI's and c/o having to straight cath much more frequently.  No cough sob or fever. Also with history of MRSA pna.  In ED pt given fluids and antibiotics with dec HR to 90's.  CTPA neg for PE (pt s/p IVC filter) but with multilobar mucoid impaction  PAST MEDICAL & SURGICAL HISTORY:  Paraplegia  Aortic dissection, abdominal: Type B Watched regularly  Aortic dissection, thoracic: Type A Repaired  Blindness of left eye  Chronic constipation  UTI (urinary tract infection)  TIA (transient ischemic attack)  HTN (Hypertension)  S/P aortic dissection repair: Type A Dissection repair  H/O Spinal surgery  sacral decubitus    Allergies    Cipro (Rash)  Fosamax (Unknown)    Intolerances          MEDICATIONS  (STANDING):  ascorbic acid 500 milliGRAM(s) Oral daily  atorvastatin 40 milliGRAM(s) Oral at bedtime  baclofen 20 milliGRAM(s) Oral three times a day  cholecalciferol 1000 Unit(s) Oral daily  cyanocobalamin 500 MICROGram(s) Oral daily  dexamethasone/neomycin/polymyxin Suspension 1 Drop(s) Both EYES every 6 hours  DULoxetine 20 milliGRAM(s) Oral daily  enoxaparin Injectable 40 milliGRAM(s) SubCutaneous daily  folic acid 1 milliGRAM(s) Oral daily  piperacillin/tazobactam IVPB. 3.375 Gram(s) IV Intermittent every 8 hours  sodium chloride 0.9%. 1000 milliLiter(s) (100 mL/Hr) IV Continuous <Continuous>  vancomycin  IVPB 1000 milliGRAM(s) IV Intermittent every 12 hours    MEDICATIONS  (PRN):  oxyCODONE    5 mG/acetaminophen 325 mG 1 Tablet(s) Oral every 4 hours PRN Severe Pain (7 - 10)            Vital Signs Last 24 Hrs  T(C): 36.8 (2018 01:39), Max: 37 (2018 23:38)  T(F): 98.3 (:39), Max: 98.6 (2018 23:38)  HR: 101 (:39) (101 - 133)  BP: 164/83 (:39) (145/89 - 177/97)  BP(mean): --  RR: 17 (:39) (17 - 20)  SpO2: 96% (:39) (96% - 97%)  Daily Height in cm: 157.48 (:39)    Daily   I&O's Detail    2018 07:01  -  2018 07:00  --------------------------------------------------------  IN:    Oral Fluid: 240 mL  Total IN: 240 mL    OUT:  Total OUT: 0 mL    Total NET: 240 mL          Physical Exam  Neck without JVD  Lungs clear  Cor s1s2 2/6 cynthia rusb  Abd soft  ext without edema    LABS  PT/INR - ( 2018 16:32 )   PT: 12.8 sec;   INR: 1.18 ratio         PTT - ( 2018 16:32 )  PTT:34.5 sec  Urinalysis Basic - ( 2018 16:15 )    Color: Yellow / Appearance: SL Turbid / S.015 / pH: x  Gluc: x / Ketone: Negative  / Bili: Negative / Urobili: Negative   Blood: x / Protein: 30 mg/dL / Nitrite: Positive   Leuk Esterase: Small / RBC: 0-2 /HPF / WBC 10-25 /HPF   Sq Epi: x / Non Sq Epi: x / Bacteria: Few /HPF      CARDIAC MARKERS ( 2018 05:19 )  x     / <0.01 ng/mL / 28 U/L / x     / 2.6 ng/mL  CARDIAC MARKERS ( 2018 16:32 )  x     / <0.01 ng/mL / 30 U/L / x     / 3.0 ng/mL      CBC Full  -  ( 2018 05:19 )  WBC Count : 5.3 K/uL  Hemoglobin : 10.1 g/dL  Hematocrit : 30.4 %  Platelet Count - Automated : 334 K/uL  Mean Cell Volume : 87.7 fl  Mean Cell Hemoglobin : 29.2 pg  Mean Cell Hemoglobin Concentration : 33.3 gm/dL  Auto Neutrophil # : x  Auto Lymphocyte # : x  Auto Monocyte # : x  Auto Eosinophil # : x  Auto Basophil # : x  Auto Neutrophil % : x  Auto Lymphocyte % : x  Auto Monocyte % : x  Auto Eosinophil % : x  Auto Basophil % : x        142  |  104  |  12  ----------------------------<  106<H>  3.5   |  28  |  0.37<L>    Ca    9.1      2018 05:19    TPro  6.7  /  Alb  3.7  /  TBili  0.4  /  DBili  x   /  AST  14  /  ALT  10  /  AlkPhos  112  02-05    T4 normal endocrinologist last week        EKG:  < from: 12 Lead ECG (18 @ 15:22) >  Ventricular Rate 120 BPM    Atrial Rate 120 BPM    P-R Interval 136 ms    QRS Duration 96 ms     ms    QTc 474 ms    P Axis 68 degrees    R Axis -43 degrees    T Axis 43 degrees    Diagnosis Line SINUS TACHYCARDIA  POSSIBLE LEFT ATRIAL ENLARGEMENT  LEFT AXIS DEVIATION  INCOMPLETE RIGHT BUNDLE BRANCH BLOCK  nssttabn  ABNORMAL ECG  CM sr 90's  CTPA  < from: CT Angio Chest w/ IV Cont (18 @ 20:36) >    EXAM:  CT ANGIO CHEST (W)AW IC                            PROCEDURE DATE:  2018            INTERPRETATION:  CLINICAL INFORMATION: Tachycardia    COMPARISON: CT chest from 2018    PROCEDURE:   CT Angiography of the Chest.  90 ml of Omnipaque 350 was injected intravenously. 10 ml were discarded.  Sagittal and coronal reformats were performed as well as 3D (MIP)   reconstructions.    FINDINGS:    CHEST:     LUNGS AND LARGE AIRWAYS: Patent central airways. Emphysema.  Mcoud impacted rightupper, right lower and left lower lobe airways.   Tubular opacities in the right upper lobe along the minor and major   fissures that likely represent mucoid impacted airways. Tree-in-bud   opacities in the bilateral lower lobes, right greater than left.   PLEURA: No pleural effusion.  VESSELS: Dissection of the aortic arch/brachiocephalic artery extending   to the visualized right common carotid artery. Prior graft repair of the   thoracic aorta. No evidence of pulmonary embolism.  HEART: Heart size is normal. No pericardial effusion.  MEDIASTINUM AND NAWAF: No lymphadenopathy.  CHEST WALL AND LOWER NECK: Diffusely enlarged and heterogeneous thyroid   gland.  VISUALIZED UPPER ABDOMEN: Within normal limits.  BONES: Within normal limits.    IMPRESSION:     Dissection of the aortic arch/brachiocephalic artery extending to the   visualized right common carotid artery which is unchanged when compared   to 2018.     No evidence of pulmonary embolism.    Mucoid impacted airways as described abov      Assessment and Plan:  Pt is a 68 yo female unknown rheumatologic disorder  was on high dose steroids, s/p Type A dissection repair  , s/p TIA , UGI bleed  s/p spontaneous subdural spinal cord hemorrhage , s/p 2 detethering procedures leaving pt mostly wheelchair bound, s/p thoracic aorta aneurysm repair , s/p hospitalization  for hypotension (labetelol and amlodipine dc'd), admitted for sinus tachycardia to 135 yesterday.  Pt with recurrent UTI's and c/o having to straight cath much more frequently.  No cough sob or fever. Also with history of MRSA pna.  In ED pt given fluids and antibiotics with dec HR to 90's  Tachycardia likely related to infection and dehydration.   Rec Restart labetelol at 100mg bid and titrate as necessary in pt s/p 2 surgeries for aortic dissection.  CT stable since sugery   Pulmonary, ID and wound consult
PULMONARY PROGRESS NOTE    BRENT MONSALVE  MRN-[id number]    Patient is a 67y old  Female who presents with a chief complaint of Tachycardia (06 Feb 2018 02:41)      HPI:  -denies cough, chest pains or shortness of breath.  CT chest with mucoid impaction.  afebrile, wbc wnl    ROS:   -denies N/V/D      ACTIVE MEDICATION LIST:  MEDICATIONS  (STANDING):  ascorbic acid 500 milliGRAM(s) Oral daily  atorvastatin 40 milliGRAM(s) Oral at bedtime  baclofen 20 milliGRAM(s) Oral three times a day  cholecalciferol 1000 Unit(s) Oral daily  cyanocobalamin 500 MICROGram(s) Oral daily  dexamethasone/neomycin/polymyxin Suspension 1 Drop(s) Both EYES every 6 hours  DULoxetine 20 milliGRAM(s) Oral daily  enoxaparin Injectable 40 milliGRAM(s) SubCutaneous daily  folic acid 1 milliGRAM(s) Oral daily  labetalol 100 milliGRAM(s) Oral two times a day  piperacillin/tazobactam IVPB. 3.375 Gram(s) IV Intermittent every 8 hours  sodium chloride 0.9%. 1000 milliLiter(s) (100 mL/Hr) IV Continuous <Continuous>  vancomycin  IVPB 1000 milliGRAM(s) IV Intermittent every 12 hours    MEDICATIONS  (PRN):  oxyCODONE    5 mG/acetaminophen 325 mG 1 Tablet(s) Oral every 4 hours PRN Severe Pain (7 - 10)      EXAM:  Vital Signs Last 24 Hrs  T(C): 36.8 (06 Feb 2018 01:39), Max: 37 (05 Feb 2018 23:38)  T(F): 98.3 (06 Feb 2018 01:39), Max: 98.6 (05 Feb 2018 23:38)  HR: 101 (06 Feb 2018 01:39) (101 - 133)  BP: 164/83 (06 Feb 2018 01:39) (145/89 - 177/97)  BP(mean): --  RR: 17 (06 Feb 2018 01:39) (17 - 20)  SpO2: 96% (06 Feb 2018 01:39) (96% - 97%)    GENERAL: The patient is awake and alert in no apparent distress.     SKIN: Warm, dry,     LUNGS: Clear to auscultation without wheezing, rales or rhonchi; respirations unlabored    HEART: S1/S2    ABDOMEN: +BS, Soft, Nontender    EXTREMITIES: No clubbing, cyanosis, edema    LABS/IMGAING: reviewed                          10.1   5.3   )-----------( 334      ( 06 Feb 2018 05:19 )             30.4     02-06    142  |  104  |  12  ----------------------------<  106<H>  3.5   |  28  |  0.37<L>    Ca    9.1      06 Feb 2018 05:19    TPro  6.7  /  Alb  3.7  /  TBili  0.4  /  DBili  x   /  AST  14  /  ALT  10  /  AlkPhos  112  02-05    < from: CT Angio Chest w/ IV Cont (02.05.18 @ 20:36) >    LUNGS AND LARGE AIRWAYS: Patent central airways. Emphysema.  Mcoud impacted rightupper, right lower and left lower lobe airways.   Tubular opacities in the right upper lobe along the minor and major   fissures that likely represent mucoid impacted airways. Tree-in-bud   opacities in the bilateral lower lobes, right greater than left.   PLEURA: No pleural effusion.  VESSELS: Dissection of the aortic arch/brachiocephalic artery extending   to the visualized right common carotid artery. Prior graft repair of the   thoracic aorta. No evidence of pulmonary embolism.  HEART: Heart size is normal. No pericardial effusion.  MEDIASTINUM AND NAWAF: No lymphadenopathy.  CHEST WALL AND LOWER NECK: Diffusely enlarged and heterogeneous thyroid   gland.  VISUALIZED UPPER ABDOMEN: Within normal limits.  BONES: Within normal limits.    IMPRESSION:     Dissection of the aortic arch/brachiocephalic artery extending to the   visualized right common carotid artery which is unchanged when compared   to 1/8/2018.     No evidence of pulmonary embolism.    Mucoid impacted airways as described above.    < end of copied text >    PROBLEM LIST:  67y Female with HEALTH ISSUES - PROBLEM Dx:  mucoid impacted airways  Decubitus ulcer of back  Aortic dissection, thoracic  Paraplegia  Urinary tract infection     RECS:  -pt clinically without pna, wbc normal, afebrile, would not treat as pneumonia currently.  -pulmozyme nebs bid  -provide pt with acapelle device to help with airway clearance  -incentive spirometry  -if clinically worsens, would broaden abx for pna coverage.    Rosie Ness MD  [phone]
Patient is a 67y old  Female who presents with a chief complaint of Tachycardia (06 Feb 2018 02:41)      SUBJECTIVE / OVERNIGHT EVENTS: Sinus 50-60 on tele, no cp, sob, chills    MEDICATIONS  (STANDING):  ascorbic acid 500 milliGRAM(s) Oral daily  atorvastatin 40 milliGRAM(s) Oral at bedtime  baclofen 20 milliGRAM(s) Oral four times a day  cholecalciferol 1000 Unit(s) Oral daily  cyanocobalamin 500 MICROGram(s) Oral daily  dexamethasone/neomycin/polymyxin Suspension 1 Drop(s) Left EYE every 6 hours  dornase tony Solution 2.5 milliGRAM(s) Inhalation two times a day  DULoxetine 20 milliGRAM(s) Oral daily  enoxaparin Injectable 40 milliGRAM(s) SubCutaneous daily  folic acid 1 milliGRAM(s) Oral daily  labetalol 200 milliGRAM(s) Oral two times a day    MEDICATIONS  (PRN):  oxyCODONE    5 mG/acetaminophen 325 mG 1 Tablet(s) Oral every 4 hours PRN Severe Pain (7 - 10)        CAPILLARY BLOOD GLUCOSE        I&O's Summary    07 Feb 2018 07:01  -  08 Feb 2018 07:00  --------------------------------------------------------  IN: 1260 mL / OUT: 1300 mL / NET: -40 mL        PHYSICAL EXAM:  GENERAL: NAD, well-developed  HEAD:  Atraumatic, Normocephalic  EYES: conjunctiva and sclera clear  NECK: Supple, No JVD  CHEST/LUNG: Clear to auscultation bilaterally; No wheeze  HEART: Regular rate and rhythm; S1S2  ABDOMEN: Soft, Nontender, Nondistended; Bowel sounds present  EXTREMITIES:  2+ Peripheral Pulses, No clubbing, cyanosis, or edema  PSYCH: AAOx3  NEUROLOGY: paraplegic  SKIN: No rashes or lesions    LABS:                        9.5    6.0   )-----------( 251      ( 08 Feb 2018 06:37 )             28.8     02-08    142  |  106  |  15  ----------------------------<  100<H>  4.0   |  29  |  0.38<L>    Ca    9.0      08 Feb 2018 06:37  Phos  3.2     02-08  Mg     2.0     02-08                RADIOLOGY & ADDITIONAL TESTS:    Imaging Personally Reviewed:    Consultant(s) Notes Reviewed:  cardiology     Care Discussed with Consultants/Other Providers:
BRENT MONSALVE  MRN-[id number]    Chief Complaint: Patient is a 67y old  Female who presents with a chief complaint of Tachycardia (2018 02:41)    SUBJECTIVE / OVERNIGHT EVENTS:  Patient seen and examined at bedside. No complaints today.     Review of Systems:   14 point ROS negative in detail except for the above.  Unable to evaluate ROS due to: cognitive deficits / altered mental status    MEDICATIONS  (STANDING):  ascorbic acid 500 milliGRAM(s) Oral daily  atorvastatin 40 milliGRAM(s) Oral at bedtime  baclofen 20 milliGRAM(s) Oral three times a day  cholecalciferol 1000 Unit(s) Oral daily  cyanocobalamin 500 MICROGram(s) Oral daily  dexamethasone/neomycin/polymyxin Suspension 1 Drop(s) Both EYES every 6 hours  DULoxetine 20 milliGRAM(s) Oral daily  enoxaparin Injectable 40 milliGRAM(s) SubCutaneous daily  folic acid 1 milliGRAM(s) Oral daily  labetalol 100 milliGRAM(s) Oral two times a day  piperacillin/tazobactam IVPB. 3.375 Gram(s) IV Intermittent every 8 hours  sodium chloride 0.9%. 1000 milliLiter(s) (100 mL/Hr) IV Continuous <Continuous>  vancomycin  IVPB 1000 milliGRAM(s) IV Intermittent every 12 hours    MEDICATIONS  (PRN):  oxyCODONE    5 mG/acetaminophen 325 mG 1 Tablet(s) Oral every 4 hours PRN Severe Pain (7 - 10)      T(C): 36.8 (18 @ 01:39), Max: 37 (18 @ 23:38)  HR: 101 (18 @ 01:39) (101 - 133)  BP: 164/83 (18 @ 01:39) (145/89 - 177/97)  RR: 17 (18 @ 01:39) (17 - 20)  SpO2: 96% (18 @ 01:39) (96% - 97%)    CAPILLARY BLOOD GLUCOSE      I&O's Summary    2018 07:01  -  2018 07:00  --------------------------------------------------------  IN: 240 mL / OUT: 0 mL / NET: 240 mL      Allergies    Cipro (Rash)  Fosamax (Unknown)    Intolerances      PHYSICAL EXAM:  GENERAL: Not in distress, well-developed  HEAD:  Atraumatic, Normocephalic  EYES: EOMI, PERRLA, conjunctiva and sclera clear  NECK: Supple, No JVD  CHEST/LUNG: Clear to auscultation bilaterally; No wheeze  HEART: Regular rate and rhythm; No murmurs, rubs, or edema  ABDOMEN: Soft, Nontender,  EXTREMITIES: No joint effusions,  PSYCH: Normal mood and affect, alert and oriented  NEUROLOGY: Paraplegia,   SKIN: No rashes or lesions    LABS:                        10.1   5.3   )-----------( 334      ( 2018 05:19 )             30.4     02-06    142  |  104  |  12  ----------------------------<  106<H>  3.5   |  28  |  0.37<L>    Ca    9.1      2018 05:19    TPro  6.7  /  Alb  3.7  /  TBili  0.4  /  DBili  x   /  AST  14  /  ALT  10  /  AlkPhos  112  02-05    LIVER FUNCTIONS - ( 2018 16:32 )  Alb: 3.7 g/dL / Pro: 6.7 g/dL / ALK PHOS: 112 U/L / ALT: 10 U/L RC / AST: 14 U/L / GGT: x           PT/INR - ( 2018 16:32 )   PT: 12.8 sec;   INR: 1.18 ratio         PTT - ( 2018 16:32 )  PTT:34.5 sec  CARDIAC MARKERS ( 2018 05:19 )  x     / <0.01 ng/mL / 28 U/L / x     / 2.6 ng/mL  CARDIAC MARKERS ( 2018 16:32 )  x     / <0.01 ng/mL / 30 U/L / x     / 3.0 ng/mL      Urinalysis Basic - ( 2018 16:15 )    Color: Yellow / Appearance: SL Turbid / S.015 / pH: x  Gluc: x / Ketone: Negative  / Bili: Negative / Urobili: Negative   Blood: x / Protein: 30 mg/dL / Nitrite: Positive   Leuk Esterase: Small / RBC: 0-2 /HPF / WBC 10-25 /HPF   Sq Epi: x / Non Sq Epi: x / Bacteria: Few /HPF    EKG/Telemetry:  NA    Radiology  < from: Xray Chest 1 View AP/PA (18 @ 16:15) >  No focal lung consolidations.    < end of copied text >    Consultant(s) Notes Reviewed: Cardiology    Care Discussed with Consultants/Other Providers: yes    The above recommendations were discussed with the patient/family. The patient/family had all questions answered and expressed understanding of the plan.
HPI:  Pt is a 68 yo female unknown rheumatologic disorder  was on high dose steroids, s/p Type A dissection repair  , s/p TIA , UGI bleed  s/p spontaneous subdural spinal cord hemorrhage , s/p 2 detethering procedures leaving pt mostly wheelchair bound, s/p thoracic aorta aneurysm repair , s/p hospitalization  for hypotension (labetelol and amlodipine dc'd), admitted for sinus tachycardia to 135 yesterday.  Pt with recurrent UTI's and c/o having to straight cath much more frequently.  No cough sob or fever. Also with history of MRSA pna.  In ED pt given fluids and antibiotics with dec HR to 90's.  CTPA neg for PE (pt s/p IVC filter) but with multilobar mucoid impaction. No source of infection found.  Antibiotics dc'd.  On pulmazyme meds and acapella device.   PAST MEDICAL & SURGICAL HISTORY:  Paraplegia  Aortic dissection, abdominal: Type B Watched regularly  Aortic dissection, thoracic: Type A Repaired  Blindness of left eye  Chronic constipation  UTI (urinary tract infection)  TIA (transient ischemic attack)  HTN (Hypertension)  S/P aortic dissection repair: Type A Dissection repair  H/O Spinal surgery      Allergies    Cipro (Rash)  Fosamax (Unknown)    Intolerances          MEDICATIONS  (STANDING):  ascorbic acid 500 milliGRAM(s) Oral daily  atorvastatin 40 milliGRAM(s) Oral at bedtime  baclofen 20 milliGRAM(s) Oral four times a day  cholecalciferol 1000 Unit(s) Oral daily  cyanocobalamin 500 MICROGram(s) Oral daily  dexamethasone/neomycin/polymyxin Suspension 1 Drop(s) Left EYE every 6 hours  dornase tony Solution 2.5 milliGRAM(s) Inhalation two times a day  DULoxetine 20 milliGRAM(s) Oral daily  enoxaparin Injectable 40 milliGRAM(s) SubCutaneous daily  folic acid 1 milliGRAM(s) Oral daily  labetalol 100 milliGRAM(s) Oral two times a day    MEDICATIONS  (PRN):  oxyCODONE    5 mG/acetaminophen 325 mG 1 Tablet(s) Oral every 4 hours PRN Severe Pain (7 - 10)            Vital Signs Last 24 Hrs  T(C): 36.8 (2018 04:39), Max: 36.8 (2018 13:12)  T(F): 98.2 (2018 04:39), Max: 98.3 (2018 13:12)  HR: 93 (2018 04:39) (88 - 101)  BP: 158/76 (2018 04:39) (105/60 - 158/76)  BP(mean): --  RR: 18 (2018 04:39) (18 - 18)  SpO2: 95% (2018 04:39) (95% - 96%)  Daily     Daily Weight in k.7 (2018 00:59)  I&O's Detail    2018 07:01  -  2018 07:00  --------------------------------------------------------  IN:    Oral Fluid: 1260 mL  Total IN: 1260 mL    OUT:    Indwelling Catheter - Urethral: 1300 mL  Total OUT: 1300 mL    Total NET: -40 mL          Physical Exam  Neck without JVD  Lungs clear  Cor s1s2 2/6 cynthia rusb  Abd soft  ext without edema    LABS          CBC Full  -  ( 2018 06:37 )  WBC Count : 6.0 K/uL  Hemoglobin : 9.5 g/dL  Hematocrit : 28.8 %  Platelet Count - Automated : 251 K/uL  Mean Cell Volume : 89.7 fl  Mean Cell Hemoglobin : 29.7 pg  Mean Cell Hemoglobin Concentration : 33.1 gm/dL  Auto Neutrophil # : x  Auto Lymphocyte # : x  Auto Monocyte # : x  Auto Eosinophil # : x  Auto Basophil # : x  Auto Neutrophil % : x  Auto Lymphocyte % : x  Auto Monocyte % : x  Auto Eosinophil % : x  Auto Basophil % : x    02-08    142  |  106  |  15  ----------------------------<  100<H>  4.0   |  29  |  0.38<L>    Ca    9.0      2018 06:37  Phos  3.2     02-08  Mg     2.0     02-08  TTE    < from: Echocardiogram (16 @ 15:32) >  EXAM:  ECHOCARDIOGRAM (CARDIOL)                             PROCEDURE DATE:  2016                      INTERPRETATION:  Patient Height: 170.0 cm  Patient Weight: 55.0 kg  Systolic Pressure: 132 mmHg  Diastolic Pressure: 74 mmHg  BSA: 1.6 m^2  Procedure Details  A complete two-dimensional transthoracic echocardiogram was performed (2D,  M-mode, spectral and color flow doppler).  Study Quality: Fair.  Left Ventricle  Normal left ventricular size and wall thickness.  The left ventricular wall motion is normal.  The left ventricular ejection fraction is normal.  The left ventricular ejection fraction is 65%.  Left Atrium  The left atrial size is normal.  Right Atrium  Right atrial size is normal.  Right Ventricle  The right ventricle is normal in size and function.  Aortic Valve  There is moderate aortic valve thickening.  There is mild aortic regurgitation.  Mitral Valve  There is trace mitral regurgitation.  Tricuspid Valve  There is mild tricuspid regurgitation.  There is no echocardiographic evidence for pulmonary hypertension.  The pulmonary artery systolic pressure is estimated to be 30 mmHg.  Pulmonic Valve  No pulmonic regurgitation noted.  Arteries and Venous System  The aortic root is dilated.  The aortic root measures 4.5 cm at sinuses (normal less than 4 cm for men,  less than 3.6 cm for women).  Descending thoracic aorta not well visualized.  Dissection flap (likely chronic) noted int eh abdominal aorta.  The inferior vena cava is normal in size (<2.1 cm) with normal inspiratory  collapse (>50%) consistent with normal right atrial pressure.  Pericardium / Pleura  There is no pericardial effusion.  Additional Comments  The results of the echocardiogram were reported to CT surgery PA at   3:50pm.  Doppler Measurements Calculations  MV E point: 93.0 cm/sec  MV A point: 86.4 cm/sec  MV E/A: 1.1  MV V2 max: 102 cm/sec  MV max P.2 mmHg  MV V2 mean: 61.4 cm/sec  MV mean P.7 mmHg  MV V2 VTI: 32.9 cm  MVA(VTI): 7.2 cm  MV dec time: 0.2 sec  Ao V2 max: 161 cm/sec  Ao max PG: 10.4 mmHg  Ao max PG (full): 0 mmHg  Ao V2 mean: 112.5 cm/sec  Ao mean P.7 mmHg  Ao mean PG (full): 0.5 mmHg  Ao V2 VTI: 30.9 cm  BONNIE(I,A): 7.7 cm  BONNIE(I,D): 7.7 cm  BONNIE(V,A): 6.9 cm  BONNIE(V,D): 6.9 cm  LV max PG: 10.4 mmHg  LV mean P.3 mmHg  LV V1 max: 161 cm/sec  LV V1 mean: 104.5 cm/sec  LV V1 VTI: 34.3 cm  SV(Ao): 454.8 ml  SI(Ao): 278.4 ml/m  SV(LVOT): 236.5 ml  SI(LVOT): 144.8 ml/m  TR Max heather: 266.9 cm/sec  MMode 2D Measurements Calculations  IVSd: 0.8 cm  LVIDd: 4.5 cm  LVPWd: 1.0 cm  IVS/LVPW: 0.9  EDV(Teich): 91.2 ml  LV mass(C)d: 131.0 grams  LV mass(C)dI: 80.2 grams/m  Ao root diam: 4.3 cm  Ao root area: 14.7 cm  LA dimension: 2.8 cm  LA/Ao: 0.7  LVOT diam: 3.0 cm  LVOT area: 6.9 cm  LVOT area (M): 6.9 cm  EDV(MOD-sp4): 69 ml  EDV(MOD-sp2): 48.4 ml  Interpreting Physician:Nimo Pruitt MD electronically signed on 2016   15:53:52    IMPRESSION: Interpretation Summary  Normal left ventricular size and wall thickness. The left ventricular wall   motion is normal.  The left ventricular ejection fraction is normal. The   left   ventricular ejection fraction is 65%.  The left atrial size is normal.   Right   atrial size is normal.The right ventricle isnormal in size and   function.There   is moderate aortic valve thickening. There is mild aortic regurgitation.   There is no echocardiographic evidence for pulmonary hypertension. The   inferior vena cava is normal in size (<2.1 cm) with normal inspiratory   collapse (>50%) consistent with normal right atrial pressure.  The   aortic root   is dilated. The aortic root measures 4.5 cm at sinuses (normal less than   4 cm   for men, less than 3.6 cm for women).  Descending thoracic aorta not well   visualized. Dissection flap (likely chronic) noted int eh abdominal   aorta.There is no pericardial effusion.    MR brain  < from: MR Head No Cont (18 @ 22:55) >    EXAM:  MR BRAIN                            PROCEDURE DATE:  2018            INTERPRETATION:  INDICATIONS: Aortic dissection extending into the right   common carotid artery. Admitted for tachycardia.    TECHNIQUE:  Multiplanar imaging was performed using T1 weighted, T2   weighted and FLAIR sequences.  Diffusion weighted and SWI images were   also obtained.      COMPARISON EXAMINATION:  Head CT dated 2018 and brain MRI dated   2016.      FINDINGS:    There is no evidence of acuteinfarct, intracranial hemorrhage, mass   effect or midline shift.    Ventricles and sulci are age-appropriate in size.    There are chronic multiple scattered punctate foci of susceptibility   artifact within the bilateral cerebellum, right brachium pontis, and   loosely scattered throughout the peripheral bilateral cerebrum slightly   increased in number since brain MRI 16, likely representing chronic   microhemorrhages from sequela of hypertension.    There is no extra-axial fluid collection.    Major intracranial flow-voids are preserved.    The sellar and suprasellar structures, and craniocervical junction are   unremarkable.    Bilateral staphylomas are noted. Otherwise, the visualized orbits are   unremarkable.    The calvarium demonstrates normal signal intensity.    Opacification of posterior right ethmoid air cell, otherwise the   paranasal sinuses are clear. Nonspecific scattered signal intensity   within the bilateral mastoid air cells.    IMPRESSION:    No acute infarct, intracranial hemorrhage, or mass effect.    Slight increase in number of scattered infratentorial/supratentorial   punctate chronic microhemorrhages since brain MRI of 16, likely   sequela of hypertension.            Assessment and Plan:  Pt is a 68 yo female unknown rheumatologic disorder 2007 was on high dose steroids, s/p Type A dissection repair  , s/p TIA , UGI bleed  s/p spontaneous subdural spinal cord hemorrhage , s/p 2 detethering procedures leaving pt mostly wheelchair bound, s/p thoracic aorta aneurysm repair , s/p hospitalization  for hypotension (labetelol and amlodipine dc'd), admitted for sinus tachycardia to 135 yesterday.  Pt with recurrent UTI's and c/o having to straight cath much more frequently.  No cough sob or fever. Also with history of MRSA pna.  In ED pt given fluids and antibiotics with dec HR to 90's.  CTPA neg for PE (pt s/p IVC filter) but with multilobar mucoid impaction. No source of infection found.  Antibiotics dc'd.  On pulmazyme meds and acapella device.   MRI micro hemorrhages   Will keep tight BP control as outpatient  Increase labetelol to 200mg bid  Will follow as oupatient next week
PULMONARY PROGRESS NOTE    BRENT MONSALVE  MRN-[id number]    Patient is a 67y old  Female who presents with a chief complaint of Tachycardia (06 Feb 2018 02:41)      HPI:  -denies cough, chest pains or shortness of breath.  has back and leg pains    ROS:   -denies N/V/D      MEDICATIONS  (STANDING):  acetaminophen  IVPB. 650 milliGRAM(s) IV Intermittent once  ascorbic acid 500 milliGRAM(s) Oral daily  atorvastatin 40 milliGRAM(s) Oral at bedtime  baclofen 20 milliGRAM(s) Oral four times a day  cholecalciferol 1000 Unit(s) Oral daily  cyanocobalamin 500 MICROGram(s) Oral daily  dexamethasone/neomycin/polymyxin Suspension 1 Drop(s) Left EYE every 6 hours  dornase tony Solution 2.5 milliGRAM(s) Inhalation two times a day  DULoxetine 20 milliGRAM(s) Oral daily  enoxaparin Injectable 40 milliGRAM(s) SubCutaneous daily  folic acid 1 milliGRAM(s) Oral daily  labetalol 100 milliGRAM(s) Oral two times a day    MEDICATIONS  (PRN):  oxyCODONE    5 mG/acetaminophen 325 mG 1 Tablet(s) Oral every 4 hours PRN Severe Pain (7 - 10)      EXAM:  Vital Signs Last 24 Hrs  T(C): 36.8 (07 Feb 2018 13:12), Max: 36.8 (07 Feb 2018 04:29)  T(F): 98.3 (07 Feb 2018 13:12), Max: 98.3 (07 Feb 2018 04:29)  HR: 101 (07 Feb 2018 13:12) (88 - 101)  BP: 130/67 (07 Feb 2018 13:12) (126/70 - 173/90)  BP(mean): --  RR: 18 (07 Feb 2018 13:12) (18 - 18)  SpO2: 96% (07 Feb 2018 13:12) (96% - 98%)    GENERAL: The patient is awake and alert in no apparent distress.     SKIN: Warm, dry,     LUNGS: Clear to auscultation without wheezing, rales or rhonchi; respirations unlabored    HEART: S1/S2    ABDOMEN: +BS, Soft, Nontender    EXTREMITIES: No clubbing, cyanosis, edema    LABS/IMGAING: reviewed                          10.3   4.8   )-----------( 272      ( 07 Feb 2018 06:12 )             30.7     02-07    142  |  104  |  14  ----------------------------<  102<H>  4.0   |  26  |  0.35<L>    Ca    9.3      07 Feb 2018 06:12  Phos  3.2     02-07  Mg     1.9     02-07    TPro  6.7  /  Alb  3.7  /  TBili  0.4  /  DBili  x   /  AST  14  /  ALT  10  /  AlkPhos  112  02-05      < from: CT Angio Chest w/ IV Cont (02.05.18 @ 20:36) >    LUNGS AND LARGE AIRWAYS: Patent central airways. Emphysema.  Mcoud impacted rightupper, right lower and left lower lobe airways.   Tubular opacities in the right upper lobe along the minor and major   fissures that likely represent mucoid impacted airways. Tree-in-bud   opacities in the bilateral lower lobes, right greater than left.   PLEURA: No pleural effusion.  VESSELS: Dissection of the aortic arch/brachiocephalic artery extending   to the visualized right common carotid artery. Prior graft repair of the   thoracic aorta. No evidence of pulmonary embolism.  HEART: Heart size is normal. No pericardial effusion.  MEDIASTINUM AND NAWAF: No lymphadenopathy.  CHEST WALL AND LOWER NECK: Diffusely enlarged and heterogeneous thyroid   gland.  VISUALIZED UPPER ABDOMEN: Within normal limits.  BONES: Within normal limits.    IMPRESSION:     Dissection of the aortic arch/brachiocephalic artery extending to the   visualized right common carotid artery which is unchanged when compared   to 1/8/2018.     No evidence of pulmonary embolism.    Mucoid impacted airways as described above.    < end of copied text >    PROBLEM LIST:  67y Female with HEALTH ISSUES - PROBLEM Dx:  mucoid impacted airways  Decubitus ulcer of back  Aortic dissection, thoracic  Paraplegia  Urinary tract infection     RECS:  -pt clinically without pna, wbc normal, afebrile, would not treat as pneumonia currently.  -continue pulmozyme nebs bid  -acapella device to help with airway clearance  -incentive spirometry  -will need follow up chest ct in 1-3 months    Rosie Ness MD  [phone]
Patient is a 67y old  Female who presents with a chief complaint of Tachycardia (2018 02:41)      SUBJECTIVE / OVERNIGHT EVENTS: No cp, sob, chills, abdominal pain, sinus  on tele     MEDICATIONS  (STANDING):  ascorbic acid 500 milliGRAM(s) Oral daily  atorvastatin 40 milliGRAM(s) Oral at bedtime  baclofen 20 milliGRAM(s) Oral four times a day  cholecalciferol 1000 Unit(s) Oral daily  cyanocobalamin 500 MICROGram(s) Oral daily  dexamethasone/neomycin/polymyxin Suspension 1 Drop(s) Left EYE every 6 hours  dornase tony Solution 2.5 milliGRAM(s) Inhalation two times a day  DULoxetine 20 milliGRAM(s) Oral daily  enoxaparin Injectable 40 milliGRAM(s) SubCutaneous daily  folic acid 1 milliGRAM(s) Oral daily  labetalol 100 milliGRAM(s) Oral two times a day    MEDICATIONS  (PRN):  oxyCODONE    5 mG/acetaminophen 325 mG 1 Tablet(s) Oral every 4 hours PRN Severe Pain (7 - 10)        CAPILLARY BLOOD GLUCOSE        I&O's Summary    2018 07:  -  2018 07:00  --------------------------------------------------------  IN: 2330 mL / OUT: 1950 mL / NET: 380 mL    2018 07:  -  2018 15:18  --------------------------------------------------------  IN: 580 mL / OUT: 450 mL / NET: 130 mL        PHYSICAL EXAM:  GENERAL: NAD, well-developed  HEAD:  Atraumatic, Normocephalic  EYES: EOMI, PERRLA, conjunctiva and sclera clear  NECK: Supple, No JVD  CHEST/LUNG: Clear to auscultation bilaterally; No wheeze  HEART: Regular rate and rhythm; S1S2  ABDOMEN: Soft, Nontender, Nondistended; Bowel sounds present  EXTREMITIES:  no c/c/e  PSYCH: AAOx3  NEUROLOGY: paraplegic  SKIN: No rashes or lesions    LABS:                        10.3   4.8   )-----------( 272      ( 2018 06:12 )             30.7     02-07    142  |  104  |  14  ----------------------------<  102<H>  4.0   |  26  |  0.35<L>    Ca    9.3      2018 06:12  Phos  3.2     02-  Mg     1.9     02-    TPro  6.7  /  Alb  3.7  /  TBili  0.4  /  DBili  x   /  AST  14  /  ALT  10  /  AlkPhos  112  02-05    PT/INR - ( 2018 16:32 )   PT: 12.8 sec;   INR: 1.18 ratio         PTT - ( 2018 16:32 )  PTT:34.5 sec  CARDIAC MARKERS ( 2018 05:19 )  x     / <0.01 ng/mL / 28 U/L / x     / 2.6 ng/mL  CARDIAC MARKERS ( 2018 16:32 )  x     / <0.01 ng/mL / 30 U/L / x     / 3.0 ng/mL      Urinalysis Basic - ( 2018 16:15 )    Color: Yellow / Appearance: SL Turbid / S.015 / pH: x  Gluc: x / Ketone: Negative  / Bili: Negative / Urobili: Negative   Blood: x / Protein: 30 mg/dL / Nitrite: Positive   Leuk Esterase: Small / RBC: 0-2 /HPF / WBC 10-25 /HPF   Sq Epi: x / Non Sq Epi: x / Bacteria: Few /HPF        RADIOLOGY & ADDITIONAL TESTS:    Imaging Personally Reviewed:    Consultant(s) Notes Reviewed:  pulm, ID    Care Discussed with Consultants/Other Providers:
Patient is a 67y old  Female who presents with a chief complaint of Tachycardia (2018 02:41)    Pt without new complaints today except for some left hand swelling improved with ice pack  Family at bedside  NO CP or SOB or PALPS  HR reduced today with RX      Allergies:   Cipro (Rash)  Fosamax (Unknown)      MEDICATIONS  (STANDING):  ascorbic acid 500 milliGRAM(s) Oral daily  atorvastatin 40 milliGRAM(s) Oral at bedtime  baclofen 20 milliGRAM(s) Oral four times a day  cholecalciferol 1000 Unit(s) Oral daily  cyanocobalamin 500 MICROGram(s) Oral daily  dexamethasone/neomycin/polymyxin Suspension 1 Drop(s) Left EYE every 6 hours  dornase tony Solution 2.5 milliGRAM(s) Inhalation two times a day  DULoxetine 20 milliGRAM(s) Oral daily  enoxaparin Injectable 40 milliGRAM(s) SubCutaneous daily  folic acid 1 milliGRAM(s) Oral daily  labetalol 100 milliGRAM(s) Oral two times a day    MEDICATIONS  (PRN):  oxyCODONE    5 mG/acetaminophen 325 mG 1 Tablet(s) Oral every 4 hours PRN Severe Pain (7 - 10)      ROS:     A comprehensive review of systems was performed and pertinent items are noted in the history above. A detailed ROS is as follows:    non contributory    Daily     Daily Weight in k.8 (2018 10:52)  Drug Dosing Weight  Height (cm): 157.48 (2018 01:39)  Weight (kg): 44.4 (2018 01:39)  BMI (kg/m2): 17.9 (2018 01:39)  BSA (m2): 1.41 (2018 01:39)  Vital Signs Last 24 Hrs  T(C): 36.8 (2018 13:12), Max: 36.8 (2018 04:29)  T(F): 98.3 (2018 13:12), Max: 98.3 (2018 04:29)  HR: 93 (2018 17:33) (88 - 101)  BP: 122/73 (2018 17:33) (122/73 - 173/90)  BP(mean): --  RR: 18 (2018 13:12) (18 - 18)  SpO2: 96% (2018 13:12) (96% - 98%)    I&O's Summary    2018 07:01  -  2018 07:00  --------------------------------------------------------  IN: 2330 mL / OUT: 1950 mL / NET: 380 mL    2018 07:01  -  2018 18:07  --------------------------------------------------------  IN: 580 mL / OUT: 450 mL / NET: 130 mL        PHYSICAL EXAM:    General Appearance:    Comfortable, AAO X 3, in no distress.   HEENT:                       Atraumatic, PERRLA, EOMI, conjunctiva clear.   Neck:                          Supple, no adenopathy, no thyromegaly, no JVD, no carotid bruit  Back:                          Symmetric, no CV angle fullness or tenderness, no soft tissue tenderness.  Chest:                         Clear to auscultation, no wheezes, rales or rhonchi, symmetric air entry, no tachypnea, retractions or cyanosis  Heart:                          S1, S2 normal, no gallop, no murmur.  Abdomen:                    Soft, non-tender, bowel sounds active. No palpable masses.   Extremities:                 no cyanosis, no edema, no joint swelling. Peripheral pulses normal  Skin:                           Skin color, texture normal. No rashes       INTERPRETATION OF TELEMETRY:    ECG:< from: 12 Lead ECG (18 @ 15:22) >  Ventricular Rate 120 BPM    Atrial Rate 120 BPM    P-R Interval 136 ms    QRS Duration 96 ms     ms    QTc 474 ms    P Axis 68 degrees    R Axis -43 degrees    T Axis 43 degrees    Diagnosis Line SINUS TACHYCARDIA  POSSIBLE LEFT ATRIAL ENLARGEMENT  LEFT AXIS DEVIATION  INCOMPLETE RIGHT BUNDLE BRANCH BLOCK  SEPTAL INFARCT , AGE UNDETERMINED  ABNORMAL ECG    < end of copied text >    < from: Echocardiogram (16 @ 15:32) >  EXAM:  ECHOCARDIOGRAM (CARDIOL)                             PROCEDURE DATE:  2016                      INTERPRETATION:  Patient Height: 170.0 cm  Patient Weight: 55.0 kg  Systolic Pressure: 132 mmHg  Diastolic Pressure: 74 mmHg  BSA: 1.6 m^2  Procedure Details  A complete two-dimensional transthoracic echocardiogram was performed (2D,  M-mode, spectral and color flow doppler).  Study Quality: Fair.  Left Ventricle  Normal left ventricular size and wall thickness.  The left ventricular wall motion is normal.  The left ventricular ejection fraction is normal.  The left ventricular ejection fraction is 65%.  Left Atrium  The left atrial size is normal.  Right Atrium  Right atrial size is normal.  Right Ventricle  The right ventricle is normal in size and function.  Aortic Valve  There is moderate aortic valve thickening.  There is mild aortic regurgitation.  Mitral Valve  There is trace mitral regurgitation.  Tricuspid Valve  There is mild tricuspid regurgitation.  There is no echocardiographic evidence for pulmonary hypertension.  The pulmonary artery systolic pressure is estimated to be 30 mmHg.  Pulmonic Valve  No pulmonic regurgitation noted.  Arteries and Venous System  The aortic root is dilated.  The aortic root measures 4.5 cm at sinuses (normal less than 4 cm for men,  less than 3.6 cm for women).  Descending thoracic aorta not well visualized.  Dissection flap (likely chronic) noted int eh abdominal aorta.  The inferior vena cava is normal in size (<2.1 cm) with normal inspiratory  collapse (>50%) consistent with normal right atrial pressure.  Pericardium / Pleura  There is no pericardial effusion.  Additional Comments  The results of the echocardiogram were reported to CT surgery PA at   3:50pm.  Doppler Measurements Calculations  MV E point: 93.0 cm/sec  MV A point: 86.4 cm/sec  MV E/A: 1.1  MV V2 max: 102 cm/sec  MV max P.2 mmHg  MV V2 mean: 61.4 cm/sec  MV mean P.7 mmHg  MV V2 VTI: 32.9 cm  MVA(VTI): 7.2 cm  MV dec time: 0.2 sec  Ao V2 max: 161 cm/sec  Ao max PG: 10.4 mmHg  Ao max PG (full): 0 mmHg  Ao V2 mean: 112.5 cm/sec  Ao mean P.7 mmHg  Ao mean PG (full): 0.5 mmHg  Ao V2 VTI: 30.9 cm  BONNIE(I,A): 7.7 cm  BONNIE(I,D): 7.7 cm  BONNIE(V,A): 6.9 cm  BONNIE(V,D): 6.9 cm  LV max PG: 10.4 mmHg  LV mean P.3 mmHg  LV V1 max: 161 cm/sec  LV V1 mean: 104.5 cm/sec  LV V1 VTI: 34.3 cm  SV(Ao): 454.8 ml  SI(Ao): 278.4 ml/m  SV(LVOT): 236.5 ml  SI(LVOT): 144.8 ml/m  TR Max heather: 266.9 cm/sec  MMode 2D Measurements Calculations  IVSd: 0.8 cm  LVIDd: 4.5 cm  LVPWd: 1.0 cm  IVS/LVPW: 0.9  EDV(Teich): 91.2 ml  LV mass(C)d: 131.0 grams  LV mass(C)dI: 80.2 grams/m  Ao root diam: 4.3 cm  Ao root area: 14.7 cm  LA dimension: 2.8 cm  LA/Ao: 0.7  LVOT diam: 3.0 cm  LVOT area: 6.9 cm  LVOT area (M): 6.9 cm  EDV(MOD-sp4): 69 ml  EDV(MOD-sp2): 48.4 ml  Interpreting Physician:Nimo Pruitt MD electronically signed on 2016   15:53:52    IMPRESSION: Interpretation Summary  Normal left ventricular size and wall thickness. The left ventricular wall   motion is normal.  The left ventricular ejection fraction is normal. The   left   ventricular ejection fraction is 65%.  The left atrial size is normal.   Right   atrial size is normal.The right ventricle isnormal in size and   function.There   is moderate aortic valve thickening. There is mild aortic regurgitation.   There is no echocardiographic evidence for pulmonary hypertension. The   inferior vena cava is normal in size (<2.1 cm) with normal inspiratory   collapse (>50%) consistent with normal right atrial pressure.  The   aortic root   is dilated. The aortic root measures 4.5 cm at sinuses (normal less than   4 cm   for men, less than 3.6 cm for women).  Descending thoracic aorta not well   visualized. Dissection flap (likely chronic) noted int eh abdominal   aorta.There is no pericardial effusion.               "Thank you for the opportunity to participate in the care of this   patient."          NIMO PRUITT M.D., ATTENDING CARDIOLOGIST  This document has been electronically signed. Sep 22 2016  3:54P    < end of copied text >      LABS:                        10.3   4.8   )-----------( 272      ( 2018 06:12 )             30.7         142  |  104  |  14  ----------------------------<  102<H>  4.0   |  26  |  0.35<L>    Ca    9.3      2018 06:12  Phos  3.2       Mg     1.9      @ 05:19  Trop-I --  CK     28  CK MB  --     @ 16:32  Trop-I --  CK     30  CK MB  --    Pro BNP  --  @ 16:32  D Dimer  1156  @ 16:32              RADIOLOGY & ADDITIONAL STUDIES:    < from: CT Angio Chest w/ IV Cont (18 @ 20:36) >    LUNGS AND LARGE AIRWAYS: Patent central airways. Emphysema.  Mcoud impacted rightupper, right lower and left lower lobe airways.   Tubular opacities in the right upper lobe along the minor and major   fissures that likely represent mucoid impacted airways. Tree-in-bud   opacities in the bilateral lower lobes, right greater than left.   PLEURA: No pleural effusion.  VESSELS: Dissection of the aortic arch/brachiocephalic artery extending   to the visualized right common carotid artery. Prior graft repair of the   thoracic aorta. No evidence of pulmonary embolism.  HEART: Heart size is normal. No pericardial effusion.  MEDIASTINUM AND NAWAF: No lymphadenopathy.  CHEST WALL AND LOWER NECK: Diffusely enlarged and heterogeneous thyroid   gland.  VISUALIZED UPPER ABDOMEN: Within normal limits.  BONES: Within normal limits.    IMPRESSION:     Dissection of the aortic arch/brachiocephalic artery extending to the   visualized right common carotid artery which is unchanged when compared   to 2018.     No evidence of pulmonary embolism.    Mucoid impacted airways as described above.    < end of copied text >      HEALTH ISSUES - PROBLEM Dx:  Decubitus ulcer of back, unspecified ulcer stage: Decubitus ulcer of back, unspecified ulcer stage  Need for prophylactic measure: Need for prophylactic measure  Aortic dissection, thoracic: Aortic dissection, thoracic  Paraplegia: Paraplegia  Urinary tract infection without hematuria, site unspecified: Urinary tract infection without hematuria, site unspecified  ?PNA  Tachycardia: Tachycardia - improved with IVF and IV ABs

## 2018-02-08 NOTE — DISCHARGE NOTE ADULT - MEDICATION SUMMARY - MEDICATIONS TO TAKE
I will START or STAY ON the medications listed below when I get home from the hospital:    acetaminophen 325 mg oral tablet  -- 2 tab(s) by mouth every 6 hours, As needed, mild pain  -- Indication: For Pain    Cymbalta 20 mg oral delayed release capsule  -- 1 cap(s) by mouth 2 times a day  -- Indication: For Depression    Lipitor 40 mg oral tablet  -- 1 tab(s) by mouth once a day  -- Indication: For hld    labetalol 100 mg oral tablet  -- 2 tab(s) by mouth 2 times a day  -- Indication: For htn    baclofen 20 mg oral tablet  -- 1 tab(s) by mouth 4 times a day  -- Indication: For Pain    dexamethasone/neomycin/polymyxin B 1 mg-3.5 mg-10,000 units/mL ophthalmic suspension  -- 1 drop(s) to each affected eye every 6 hours  -- Indication: For eye drops     Vitamin C 500 mg oral tablet  -- 1 tab(s) by mouth once a day  -- Indication: For supplement    folic acid 1 mg oral tablet  -- 1 tab(s) by mouth once a day  -- Indication: For supplement    Vitamin B-12 500 mcg oral tablet  -- 1 tab(s) by mouth once a day  -- Indication: For supplement    Vitamin D3 1000 intl units oral capsule  -- 1 cap(s) by mouth once a day  -- Indication: For supplement I will START or STAY ON the medications listed below when I get home from the hospital:    acetaminophen 325 mg oral tablet  -- 2 tab(s) by mouth every 6 hours, As needed, mild pain  -- Indication: For Pain    oxyCODONE-acetaminophen 5 mg-325 mg oral tablet  -- 1 tab(s) by mouth every 4 hours, As needed, Severe Pain (7 - 10)  -- Indication: For Pain    Cymbalta 20 mg oral delayed release capsule  -- 1 cap(s) by mouth 2 times a day  -- Indication: For Depression    Lipitor 40 mg oral tablet  -- 1 tab(s) by mouth once a day  -- Indication: For hld    labetalol 100 mg oral tablet  -- 2 tab(s) by mouth 2 times a day  -- Indication: For htn    baclofen 20 mg oral tablet  -- 1 tab(s) by mouth 4 times a day  -- Indication: For Pain    dexamethasone/neomycin/polymyxin B 1 mg-3.5 mg-10,000 units/mL ophthalmic suspension  -- 1 drop(s) to each affected eye every 6 hours  -- Indication: For eye drops     Vitamin C 500 mg oral tablet  -- 1 tab(s) by mouth once a day  -- Indication: For supplement    folic acid 1 mg oral tablet  -- 1 tab(s) by mouth once a day  -- Indication: For supplement    Vitamin B-12 500 mcg oral tablet  -- 1 tab(s) by mouth once a day  -- Indication: For supplement    Vitamin D3 1000 intl units oral capsule  -- 1 cap(s) by mouth once a day  -- Indication: For supplement

## 2018-02-08 NOTE — OCCUPATIONAL THERAPY INITIAL EVALUATION ADULT - ANTICIPATED DISCHARGE DISPOSITION, OT EVAL
Home with home OT for home safety evaluation, ROM and strengthening. Home with supervision/assist as prior.

## 2018-02-08 NOTE — PROGRESS NOTE ADULT - PROBLEM SELECTOR PLAN 3
C/w home baclofen  Home PT  MRI of brain with punctuate hemorrhages in the setting of HTN, can follow up with neurology as outpt
C/w home baclofen  PT consult.
C/w home baclofen  PT consult.

## 2018-02-08 NOTE — DISCHARGE NOTE ADULT - HOSPITAL COURSE
68 yo F with a PMH of HTN, HLD, undiagnosed rheumatological disorder in 2007, Type A aortic thoracic dissection 5/2009 s/p repair, s/p descending aortic aneurysm repair 09/2016, spontaneous subdural spinal cord hemorrhage s/p drainage 08/2014 with 2 dethethering of the spinal cord procedures c/p paraplegia now wheelchair bound with multiple UTIs in the setting of self catheterization sent in by her cardiologist for tachycardia. Admitted for tachycardia, clinically no sign of infection-no symptoms, no leukocytosis. Likely in the setting of a mushroom supplement" 2 days ago given by son/ drinking 7 cups of tea. Evaluated by ID monitored of abx. Evaluated by cardiology, Labetalol increased to 200mg bid. Found to have urine likely colonized with klebsiella and Ecoli, monitored off abx. Pt paraplegic with back pain, to follow up with pallative/pain control as outpt. Pt s/p MRI brain with punctuate hemorrhages likely in the setting of HTN, outpt follow up with neuro. Pt with aortic dissection, thoracic. Per allscripts: The patient is seen by Dr. Norris of [facility] and NP Jarad as an o/p, who are aware of the aortic/carotid/infrarenal aortic dissection, and has documented that it is stable on 2/2/18, and are recommending repeat CTA in 01/2019 in about 1 year's time.Pulses palpable distally, and patient is hemodynamically stable currently. 68 yo F with a PMH of HTN, HLD, undiagnosed rheumatological disorder in 2007, Type A aortic thoracic dissection 5/2009 s/p repair, s/p descending aortic aneurysm repair 09/2016, spontaneous subdural spinal cord hemorrhage s/p drainage 08/2014 with 2 dethethering of the spinal cord procedures c/p paraplegia now wheelchair bound with multiple UTIs in the setting of self catheterization sent in by her cardiologist for tachycardia. Admitted for tachycardia, clinically no sign of infection-no symptoms, no leukocytosis. Likely in the setting of a mushroom supplement" 2 days ago given by son/ drinking 7 cups of tea. Evaluated by ID monitored of abx. Evaluated by cardiology, Labetalol increased to 200mg bid. Found to have urine likely colonized with klebsiella and Ecoli, monitored off abx. Pt paraplegic with back pain, to follow up with pallative/pain control as outpt. Pt s/p MRI brain with punctuate hemorrhages likely in the setting of HTN, outpt follow up with neuro. Pt with aortic dissection, thoracic. Per allscripts: The patient is seen by Dr. Norris of [facility] and NP Jarad as an o/p, who are aware of the aortic/carotid/infrarenal aortic dissection, and has documented that it is stable on 2/2/18, and are recommending repeat CTA in 01/2019 in about 1 year's time.Pulses palpable distally, and patient is hemodynamically stable currently.   Pt was admitted to a tele floor , rate and rhythm controlled s/p IVF , seen by DR Branham cardiology recommended to increase Labetalol for better BP control. Pt had MRI brain with no stroke . Is being discharged home with close follow up with PCP DR Branham and Pain management  team  and Neurologist DR Herman

## 2018-02-08 NOTE — DISCHARGE NOTE ADULT - ADDITIONAL INSTRUCTIONS
Sacral wound dressing instructions :Irrigate w/ NSPat dry, CAVILON to periwound skin, Place AQUCEL rope into wound(don't over pack)  change daily and PRN Sacral wound dressing instructions :Irrigate w/ NS, Pat dry, CAVILON to periwound skin, Place AQUCEL rope into wound(don't over pack)  change daily and PRN

## 2018-02-08 NOTE — PROGRESS NOTE ADULT - ASSESSMENT
66 yo F with a PMH of HTN, HLD, undiagnosed rheumatological disorder in 2007, Type A aortic thoracic dissection 5/2009 s/p repair, s/p descending aortic aneurysm repair 09/2016, spontaneous subdural spinal cord hemorrhage s/p drainage 08/2014 with 2 dethethering of the spinal cord procedures c/p paraplegia now wheelchair bound with multiple UTIs in the setting of self catheterization sent in by her cardiologist for tachycardia
66 yo female with HTN, HLD, undiagnosed rheumatological disorder in 2007, Type A aortic thoracic dissection 5/2009 s/p repair, s/p descending aortic aneurysm repair 09/2016, spontaneous subdural spinal cord hemorrhage s/p drainage 08/2014 with 2 detethering of the spinal cord procedures c/b paraplegia now wheelchair bound with multiple UTIs in the setting of self catheterization sent in by her cardiologist for tachycardia.     Patient has a sacral decub which she has been receiving wound care at [place]. She currently feels well. Denies cough, sputum production, sob, dysuria. As per sister, when she has a UTI, she becomes confused.    CTA chest no pna, no PE, aortic aneurysm stable in size. No localizing signs of infection. Patient states she feels well. Has sensation when she urinates and denies burning.    Assessment:  -Tachycardia resolved  -Paraplegia/ wheelchair boud  -Sacral decub does not appear infected at this time.  -Afebrile  -WBC WNL  -Asymptomatic bacteriuria    Recommend:  -Would monitor off antibiotics as patient has received several courses of antibiotics in the past for UTIs, most recently a week ago.  -F/U blood cx - thus far NGTD.  -Continue wound care to sacral decub.    Will sign off. Please call with questions.    Justin Keen MD  Pager [phone]  After 5pm/weekends call [phone]
66 yo F with a PMH of HTN, HLD, undiagnosed rheumatological disorder in 2007, Type A aortic thoracic dissection 5/2009 s/p repair, s/p descending aortic aneurysm repair 09/2016, spontaneous subdural spinal cord hemorrhage s/p drainage 08/2014 with 2 dethethering of the spinal cord procedures c/p paraplegia now wheelchair bound with multiple UTIs in the setting of self catheterization sent in by her cardiologist for tachycardia in the setting of possible UTI and PNA/or recent ingestion of supplements containing "mushroom".
66 yo F with a PMH of HTN, HLD, undiagnosed rheumatological disorder in 2007, Type A aortic thoracic dissection 5/2009 s/p repair, s/p descending aortic aneurysm repair 09/2016, spontaneous subdural spinal cord hemorrhage s/p drainage 08/2014 with 2 dethethering of the spinal cord procedures c/p paraplegia now wheelchair bound with multiple UTIs in the setting of self catheterization sent in by her cardiologist for tachycardia in the setting of possible UTI and PNA/or recent ingestion of supplements containing "mushroom".
Pt improved with above  No acute cardiac issues  Pulmonary and ID follow up  Wound care  Agree with management

## 2018-02-08 NOTE — OCCUPATIONAL THERAPY INITIAL EVALUATION ADULT - MANUAL MUSCLE TESTING RESULTS, REHAB EVAL
RUE at least 3/5, L shoulder/elbow 3/5, L wrist extension 2-/5, L wrist flexion 3/5, L digit flexion 3/5, L digit extension 2-/5, BLE 0/5/grossly assessed due to

## 2018-02-08 NOTE — DISCHARGE NOTE ADULT - HOME CARE AGENCY
[facility] Care RN, PT Soc One Day After Discharge for resumption of services [phone]

## 2018-02-08 NOTE — PROGRESS NOTE ADULT - ATTENDING COMMENTS
I personally examined this patient
Discussed with  updated on full plan of care  Stable for discharge home today

## 2018-02-08 NOTE — PROGRESS NOTE ADULT - PROBLEM SELECTOR PLAN 2
-UA positive; no urinary symptoms; possible that patient is chronically colonized given self catheterization, urine cx with klebsiella/e coli  -Monitor off abx at this time, appreciate ID recs

## 2018-02-08 NOTE — DISCHARGE NOTE ADULT - CARE PROVIDERS DIRECT ADDRESSES
,DirectAddress_Unknown,[email or web address],DirectAddress_Unknown

## 2018-02-08 NOTE — PROGRESS NOTE ADULT - PROVIDER SPECIALTY LIST ADULT
Cardiology
Cardiology
Hospitalist
Hospitalist
Pulmonology
Cardiology
Pulmonology
Infectious Disease
Hospitalist

## 2018-02-08 NOTE — DISCHARGE NOTE ADULT - PATIENT PORTAL LINK FT
You can access the [facility] Patient Portal, offered by [facility], by registering with the following website: [email or web address]

## 2018-02-11 LAB
CULTURE RESULTS: SIGNIFICANT CHANGE UP
CULTURE RESULTS: SIGNIFICANT CHANGE UP
SPECIMEN SOURCE: SIGNIFICANT CHANGE UP
SPECIMEN SOURCE: SIGNIFICANT CHANGE UP

## 2018-02-26 ENCOUNTER — OUTPATIENT (OUTPATIENT)
Dept: OUTPATIENT SERVICES | Facility: HOSPITAL | Age: 68
LOS: 1 days | Discharge: HOME | End: 2018-02-26
Payer: MEDICARE

## 2018-02-26 ENCOUNTER — RX RENEWAL (OUTPATIENT)
Age: 68
End: 2018-02-26

## 2018-02-26 DIAGNOSIS — Z88.8 ALLERGY STATUS TO OTHER DRUGS, MEDICAMENTS AND BIOLOGICAL SUBSTANCES: ICD-10-CM

## 2018-02-26 DIAGNOSIS — Z98.890 OTHER SPECIFIED POSTPROCEDURAL STATES: ICD-10-CM

## 2018-02-26 DIAGNOSIS — R63.6 UNDERWEIGHT: ICD-10-CM

## 2018-02-26 DIAGNOSIS — Z98.89 OTHER SPECIFIED POSTPROCEDURAL STATES: Chronic | ICD-10-CM

## 2018-02-26 DIAGNOSIS — G82.20 PARAPLEGIA, UNSPECIFIED: ICD-10-CM

## 2018-02-26 DIAGNOSIS — Z79.899 OTHER LONG TERM (CURRENT) DRUG THERAPY: ICD-10-CM

## 2018-02-26 DIAGNOSIS — L53.9 ERYTHEMATOUS CONDITION, UNSPECIFIED: ICD-10-CM

## 2018-02-26 DIAGNOSIS — L89.153 PRESSURE ULCER OF SACRAL REGION, STAGE 3: ICD-10-CM

## 2018-02-26 DIAGNOSIS — L92.9 GRANULOMATOUS DISORDER OF THE SKIN AND SUBCUTANEOUS TISSUE, UNSPECIFIED: ICD-10-CM

## 2018-02-26 DIAGNOSIS — Z87.891 PERSONAL HISTORY OF NICOTINE DEPENDENCE: ICD-10-CM

## 2018-02-26 DIAGNOSIS — R32 UNSPECIFIED URINARY INCONTINENCE: ICD-10-CM

## 2018-02-26 PROCEDURE — 17250 CHEM CAUT OF GRANLTJ TISSUE: CPT

## 2018-02-26 PROCEDURE — G0463: CPT | Mod: 25

## 2018-02-26 RX ORDER — ONABOTULINUMTOXINA 200 [USP'U]/1
200 INJECTION, POWDER, LYOPHILIZED, FOR SOLUTION INTRADERMAL; INTRAMUSCULAR
Qty: 3 | Refills: 0 | Status: ACTIVE | OUTPATIENT
Start: 2018-02-26

## 2018-02-27 ENCOUNTER — APPOINTMENT (OUTPATIENT)
Dept: PHYSICAL MEDICINE AND REHAB | Facility: CLINIC | Age: 68
End: 2018-02-27
Payer: MEDICARE

## 2018-02-27 VITALS
SYSTOLIC BLOOD PRESSURE: 169 MMHG | HEART RATE: 98 BPM | DIASTOLIC BLOOD PRESSURE: 82 MMHG | TEMPERATURE: 97.8 F | OXYGEN SATURATION: 94 %

## 2018-02-27 PROCEDURE — 36415 COLL VENOUS BLD VENIPUNCTURE: CPT

## 2018-02-27 PROCEDURE — 95874 GUIDE NERV DESTR NEEDLE EMG: CPT | Mod: 59

## 2018-02-27 PROCEDURE — 99213 OFFICE O/P EST LOW 20 MIN: CPT | Mod: 25

## 2018-02-27 PROCEDURE — 64643 CHEMODENERV 1 EXTREM 1-4 EA: CPT

## 2018-02-27 PROCEDURE — 64642 CHEMODENERV 1 EXTREMITY 1-4: CPT

## 2018-02-28 ENCOUNTER — TRANSCRIPTION ENCOUNTER (OUTPATIENT)
Age: 68
End: 2018-02-28

## 2018-02-28 LAB
T3 SERPL-MCNC: 124 NG/DL
T3FREE SERPL-MCNC: 3.12 PG/ML
T4 FREE SERPL-MCNC: 1.3 NG/DL
T4 SERPL-MCNC: 7.2 UG/DL
TSH SERPL-ACNC: 0.07 UIU/ML

## 2018-03-02 LAB — TSI ACT/NOR SER: <0.1 IU/L

## 2018-03-05 ENCOUNTER — APPOINTMENT (OUTPATIENT)
Dept: OPHTHALMOLOGY | Facility: HOSPITAL | Age: 68
End: 2018-03-05

## 2018-03-06 ENCOUNTER — APPOINTMENT (OUTPATIENT)
Dept: OPHTHALMOLOGY | Facility: CLINIC | Age: 68
End: 2018-03-06

## 2018-03-12 ENCOUNTER — OUTPATIENT (OUTPATIENT)
Dept: OUTPATIENT SERVICES | Facility: HOSPITAL | Age: 68
LOS: 1 days | Discharge: HOME | End: 2018-03-12
Payer: MEDICARE

## 2018-03-12 DIAGNOSIS — Z98.89 OTHER SPECIFIED POSTPROCEDURAL STATES: Chronic | ICD-10-CM

## 2018-03-12 DIAGNOSIS — L92.9 GRANULOMATOUS DISORDER OF THE SKIN AND SUBCUTANEOUS TISSUE, UNSPECIFIED: ICD-10-CM

## 2018-03-12 PROCEDURE — 17250 CHEM CAUT OF GRANLTJ TISSUE: CPT

## 2018-03-13 ENCOUNTER — APPOINTMENT (OUTPATIENT)
Dept: OPHTHALMOLOGY | Facility: CLINIC | Age: 68
End: 2018-03-13

## 2018-03-13 DIAGNOSIS — L89.153 PRESSURE ULCER OF SACRAL REGION, STAGE 3: ICD-10-CM

## 2018-03-13 DIAGNOSIS — G82.20 PARAPLEGIA, UNSPECIFIED: ICD-10-CM

## 2018-03-13 DIAGNOSIS — Z88.8 ALLERGY STATUS TO OTHER DRUGS, MEDICAMENTS AND BIOLOGICAL SUBSTANCES STATUS: ICD-10-CM

## 2018-03-13 DIAGNOSIS — R32 UNSPECIFIED URINARY INCONTINENCE: ICD-10-CM

## 2018-03-13 DIAGNOSIS — Z98.890 OTHER SPECIFIED POSTPROCEDURAL STATES: ICD-10-CM

## 2018-03-13 DIAGNOSIS — R63.6 UNDERWEIGHT: ICD-10-CM

## 2018-03-13 DIAGNOSIS — Z79.899 OTHER LONG TERM (CURRENT) DRUG THERAPY: ICD-10-CM

## 2018-03-13 DIAGNOSIS — L92.9 GRANULOMATOUS DISORDER OF THE SKIN AND SUBCUTANEOUS TISSUE, UNSPECIFIED: ICD-10-CM

## 2018-03-13 DIAGNOSIS — Z89.221 ACQUIRED ABSENCE OF RIGHT UPPER LIMB ABOVE ELBOW: ICD-10-CM

## 2018-03-13 DIAGNOSIS — Z87.891 PERSONAL HISTORY OF NICOTINE DEPENDENCE: ICD-10-CM

## 2018-03-27 ENCOUNTER — OUTPATIENT (OUTPATIENT)
Dept: OUTPATIENT SERVICES | Facility: HOSPITAL | Age: 68
LOS: 1 days | Discharge: HOME | End: 2018-03-27
Payer: MEDICARE

## 2018-03-27 DIAGNOSIS — Z98.89 OTHER SPECIFIED POSTPROCEDURAL STATES: Chronic | ICD-10-CM

## 2018-03-27 DIAGNOSIS — L89.153 PRESSURE ULCER OF SACRAL REGION, STAGE 3: ICD-10-CM

## 2018-03-27 DIAGNOSIS — R32 UNSPECIFIED URINARY INCONTINENCE: ICD-10-CM

## 2018-03-27 DIAGNOSIS — L92.9 GRANULOMATOUS DISORDER OF THE SKIN AND SUBCUTANEOUS TISSUE, UNSPECIFIED: ICD-10-CM

## 2018-03-27 DIAGNOSIS — L89.221 PRESSURE ULCER OF LEFT HIP, STAGE 1: ICD-10-CM

## 2018-03-27 DIAGNOSIS — Z79.899 OTHER LONG TERM (CURRENT) DRUG THERAPY: ICD-10-CM

## 2018-03-27 DIAGNOSIS — Z88.8 ALLERGY STATUS TO OTHER DRUGS, MEDICAMENTS AND BIOLOGICAL SUBSTANCES: ICD-10-CM

## 2018-03-27 DIAGNOSIS — Z87.891 PERSONAL HISTORY OF NICOTINE DEPENDENCE: ICD-10-CM

## 2018-03-27 DIAGNOSIS — R63.6 UNDERWEIGHT: ICD-10-CM

## 2018-03-27 DIAGNOSIS — Z98.890 OTHER SPECIFIED POSTPROCEDURAL STATES: ICD-10-CM

## 2018-03-27 DIAGNOSIS — G82.20 PARAPLEGIA, UNSPECIFIED: ICD-10-CM

## 2018-03-27 PROCEDURE — 17250 CHEM CAUT OF GRANLTJ TISSUE: CPT

## 2018-04-03 RX ORDER — TIZANIDINE 4 MG/1
4 TABLET ORAL
Qty: 270 | Refills: 5 | Status: DISCONTINUED | COMMUNITY
Start: 2017-01-31 | End: 2018-04-03

## 2018-04-20 ENCOUNTER — APPOINTMENT (OUTPATIENT)
Dept: PHYSICAL MEDICINE AND REHAB | Facility: CLINIC | Age: 68
End: 2018-04-20
Payer: MEDICARE

## 2018-04-20 VITALS
HEART RATE: 91 BPM | DIASTOLIC BLOOD PRESSURE: 77 MMHG | TEMPERATURE: 98.5 F | SYSTOLIC BLOOD PRESSURE: 145 MMHG | OXYGEN SATURATION: 98 %

## 2018-04-20 PROCEDURE — 99213 OFFICE O/P EST LOW 20 MIN: CPT

## 2018-04-23 ENCOUNTER — OUTPATIENT (OUTPATIENT)
Dept: OUTPATIENT SERVICES | Facility: HOSPITAL | Age: 68
LOS: 1 days | Discharge: HOME | End: 2018-04-23
Payer: MEDICARE

## 2018-04-23 ENCOUNTER — TRANSCRIPTION ENCOUNTER (OUTPATIENT)
Age: 68
End: 2018-04-23

## 2018-04-23 DIAGNOSIS — Z98.89 OTHER SPECIFIED POSTPROCEDURAL STATES: Chronic | ICD-10-CM

## 2018-04-23 DIAGNOSIS — L92.9 GRANULOMATOUS DISORDER OF THE SKIN AND SUBCUTANEOUS TISSUE, UNSPECIFIED: ICD-10-CM

## 2018-04-23 PROCEDURE — G0463: CPT

## 2018-04-24 DIAGNOSIS — Z87.891 PERSONAL HISTORY OF NICOTINE DEPENDENCE: ICD-10-CM

## 2018-04-24 DIAGNOSIS — Z98.890 OTHER SPECIFIED POSTPROCEDURAL STATES: ICD-10-CM

## 2018-04-24 DIAGNOSIS — L89.153 PRESSURE ULCER OF SACRAL REGION, STAGE 3: ICD-10-CM

## 2018-04-24 DIAGNOSIS — Z79.899 OTHER LONG TERM (CURRENT) DRUG THERAPY: ICD-10-CM

## 2018-04-24 DIAGNOSIS — Z88.8 ALLERGY STATUS TO OTHER DRUGS, MEDICAMENTS AND BIOLOGICAL SUBSTANCES: ICD-10-CM

## 2018-04-24 DIAGNOSIS — R63.6 UNDERWEIGHT: ICD-10-CM

## 2018-04-24 DIAGNOSIS — L89.221 PRESSURE ULCER OF LEFT HIP, STAGE 1: ICD-10-CM

## 2018-04-24 DIAGNOSIS — G82.20 PARAPLEGIA, UNSPECIFIED: ICD-10-CM

## 2018-05-04 ENCOUNTER — APPOINTMENT (OUTPATIENT)
Dept: PHYSICAL MEDICINE AND REHAB | Facility: CLINIC | Age: 68
End: 2018-05-04
Payer: MEDICARE

## 2018-05-04 ENCOUNTER — RX RENEWAL (OUTPATIENT)
Age: 68
End: 2018-05-04

## 2018-05-04 VITALS
OXYGEN SATURATION: 95 % | SYSTOLIC BLOOD PRESSURE: 103 MMHG | DIASTOLIC BLOOD PRESSURE: 64 MMHG | HEART RATE: 81 BPM | TEMPERATURE: 98.8 F

## 2018-05-04 DIAGNOSIS — G03.9 MENINGITIS, UNSPECIFIED: ICD-10-CM

## 2018-05-04 PROCEDURE — 99214 OFFICE O/P EST MOD 30 MIN: CPT

## 2018-05-04 RX ORDER — PREDNISOLONE ACETATE 10 MG/ML
1 SUSPENSION/ DROPS OPHTHALMIC
Qty: 10 | Refills: 2 | Status: ACTIVE | COMMUNITY
Start: 2018-05-04 | End: 1900-01-01

## 2018-05-04 RX ORDER — NEOMYCIN AND POLYMYXIN B SULFATES AND DEXAMETHASONE 3.5; 10000; 1 MG/G; [IU]/G; MG/G
3.5-10000-0.1 OINTMENT OPHTHALMIC 4 TIMES DAILY
Qty: 1 | Refills: 1 | Status: ACTIVE | COMMUNITY
Start: 2018-01-23 | End: 1900-01-01

## 2018-05-04 RX ORDER — NEOMYCIN SULFATE, POLYMYXIN B SULFATE AND DEXAMETHASONE 3.5; 10000; 1 MG/ML; [USP'U]/ML; MG/ML
3.5-10000-0.1 SUSPENSION OPHTHALMIC
Qty: 5 | Refills: 1 | Status: ACTIVE | COMMUNITY
Start: 2018-05-04 | End: 1900-01-01

## 2018-05-07 ENCOUNTER — TRANSCRIPTION ENCOUNTER (OUTPATIENT)
Age: 68
End: 2018-05-07

## 2018-05-07 ENCOUNTER — APPOINTMENT (OUTPATIENT)
Dept: PHYSICAL MEDICINE AND REHAB | Facility: CLINIC | Age: 68
End: 2018-05-07

## 2018-05-10 ENCOUNTER — RX RENEWAL (OUTPATIENT)
Age: 68
End: 2018-05-10

## 2018-05-11 ENCOUNTER — APPOINTMENT (OUTPATIENT)
Dept: NEUROSURGERY | Facility: CLINIC | Age: 68
End: 2018-05-11
Payer: MEDICARE

## 2018-05-11 VITALS
HEIGHT: 67 IN | DIASTOLIC BLOOD PRESSURE: 83 MMHG | BODY MASS INDEX: 16.01 KG/M2 | SYSTOLIC BLOOD PRESSURE: 154 MMHG | WEIGHT: 102 LBS | HEART RATE: 65 BPM

## 2018-05-11 PROCEDURE — 99215 OFFICE O/P EST HI 40 MIN: CPT

## 2018-05-11 RX ORDER — NEOMYCIN AND POLYMYXIN B SULFATES AND DEXAMETHASONE 3.5; 10000; 1 MG/G; [IU]/G; MG/G
3.5-10000-0.1 OINTMENT OPHTHALMIC 4 TIMES DAILY
Qty: 1 | Refills: 5 | Status: DISCONTINUED | COMMUNITY
Start: 2017-12-17 | End: 2018-05-11

## 2018-05-13 ENCOUNTER — TRANSCRIPTION ENCOUNTER (OUTPATIENT)
Age: 68
End: 2018-05-13

## 2018-05-14 ENCOUNTER — OUTPATIENT (OUTPATIENT)
Dept: OUTPATIENT SERVICES | Facility: HOSPITAL | Age: 68
LOS: 1 days | End: 2018-05-14
Payer: MEDICARE

## 2018-05-14 ENCOUNTER — RESULT REVIEW (OUTPATIENT)
Age: 68
End: 2018-05-14

## 2018-05-14 ENCOUNTER — APPOINTMENT (OUTPATIENT)
Dept: OPHTHALMOLOGY | Facility: HOSPITAL | Age: 68
End: 2018-05-14
Payer: MEDICARE

## 2018-05-14 VITALS
RESPIRATION RATE: 20 BRPM | WEIGHT: 105.82 LBS | SYSTOLIC BLOOD PRESSURE: 147 MMHG | TEMPERATURE: 99 F | DIASTOLIC BLOOD PRESSURE: 68 MMHG | HEIGHT: 67 IN | OXYGEN SATURATION: 97 % | HEART RATE: 82 BPM

## 2018-05-14 VITALS
HEART RATE: 84 BPM | DIASTOLIC BLOOD PRESSURE: 80 MMHG | SYSTOLIC BLOOD PRESSURE: 148 MMHG | RESPIRATION RATE: 16 BRPM | OXYGEN SATURATION: 99 %

## 2018-05-14 DIAGNOSIS — M53.9 DORSOPATHY, UNSPECIFIED: Chronic | ICD-10-CM

## 2018-05-14 DIAGNOSIS — Z98.89 OTHER SPECIFIED POSTPROCEDURAL STATES: Chronic | ICD-10-CM

## 2018-05-14 DIAGNOSIS — Z95.828 PRESENCE OF OTHER VASCULAR IMPLANTS AND GRAFTS: Chronic | ICD-10-CM

## 2018-05-14 DIAGNOSIS — H18.12 BULLOUS KERATOPATHY, LEFT EYE: ICD-10-CM

## 2018-05-14 PROCEDURE — 65730 CORNEAL TRANSPLANT: CPT | Mod: LT

## 2018-05-14 PROCEDURE — 87181 SC STD AGAR DILUTION PER AGT: CPT

## 2018-05-14 PROCEDURE — 88304 TISSUE EXAM BY PATHOLOGIST: CPT | Mod: 26

## 2018-05-14 PROCEDURE — 87186 SC STD MICRODIL/AGAR DIL: CPT

## 2018-05-14 PROCEDURE — 87075 CULTR BACTERIA EXCEPT BLOOD: CPT

## 2018-05-14 PROCEDURE — 87070 CULTURE OTHR SPECIMN AEROBIC: CPT

## 2018-05-14 PROCEDURE — V2785: CPT

## 2018-05-14 PROCEDURE — 88312 SPECIAL STAINS GROUP 1: CPT

## 2018-05-14 PROCEDURE — 67005 PARTIAL REMOVAL OF EYE FLUID: CPT | Mod: LT,XU

## 2018-05-14 PROCEDURE — 88304 TISSUE EXAM BY PATHOLOGIST: CPT

## 2018-05-14 PROCEDURE — 88312 SPECIAL STAINS GROUP 1: CPT | Mod: 26

## 2018-05-14 NOTE — ASU PATIENT PROFILE, ADULT - PSH
Disorder of spine  detethering  H/O Spinal surgery  laminectomies  Presence of IVC filter    S/P aortic dissection repair  Type A Dissection repair

## 2018-05-15 ENCOUNTER — APPOINTMENT (OUTPATIENT)
Dept: OPHTHALMOLOGY | Facility: CLINIC | Age: 68
End: 2018-05-15
Payer: MEDICARE

## 2018-05-15 DIAGNOSIS — H26.9 UNSPECIFIED CATARACT: ICD-10-CM

## 2018-05-15 LAB
GRAM STN FLD: SIGNIFICANT CHANGE UP
SPECIMEN SOURCE: SIGNIFICANT CHANGE UP

## 2018-05-15 PROCEDURE — 76512 OPH US DX B-SCAN: CPT

## 2018-05-15 PROCEDURE — 99024 POSTOP FOLLOW-UP VISIT: CPT

## 2018-05-17 LAB
-  AMPICILLIN: SIGNIFICANT CHANGE UP
-  CIPROFLOXACIN: SIGNIFICANT CHANGE UP
-  LINEZOLID: SIGNIFICANT CHANGE UP
-  TETRACYCLINE: SIGNIFICANT CHANGE UP
-  TOBRAMYCIN: SIGNIFICANT CHANGE UP
-  VANCOMYCIN: SIGNIFICANT CHANGE UP
METHOD TYPE: SIGNIFICANT CHANGE UP
METHOD TYPE: SIGNIFICANT CHANGE UP

## 2018-05-21 ENCOUNTER — OUTPATIENT (OUTPATIENT)
Dept: OUTPATIENT SERVICES | Facility: HOSPITAL | Age: 68
LOS: 1 days | Discharge: HOME | End: 2018-05-21
Payer: MEDICARE

## 2018-05-21 DIAGNOSIS — L92.9 GRANULOMATOUS DISORDER OF THE SKIN AND SUBCUTANEOUS TISSUE, UNSPECIFIED: ICD-10-CM

## 2018-05-21 DIAGNOSIS — M53.9 DORSOPATHY, UNSPECIFIED: Chronic | ICD-10-CM

## 2018-05-21 DIAGNOSIS — Z95.828 PRESENCE OF OTHER VASCULAR IMPLANTS AND GRAFTS: Chronic | ICD-10-CM

## 2018-05-21 DIAGNOSIS — Z98.89 OTHER SPECIFIED POSTPROCEDURAL STATES: Chronic | ICD-10-CM

## 2018-05-21 PROCEDURE — G0463: CPT

## 2018-05-22 DIAGNOSIS — L89.153 PRESSURE ULCER OF SACRAL REGION, STAGE 3: ICD-10-CM

## 2018-05-22 DIAGNOSIS — Z88.8 ALLERGY STATUS TO OTHER DRUGS, MEDICAMENTS AND BIOLOGICAL SUBSTANCES STATUS: ICD-10-CM

## 2018-05-22 DIAGNOSIS — L89.221 PRESSURE ULCER OF LEFT HIP, STAGE 1: ICD-10-CM

## 2018-05-22 DIAGNOSIS — Z79.899 OTHER LONG TERM (CURRENT) DRUG THERAPY: ICD-10-CM

## 2018-05-22 DIAGNOSIS — R32 UNSPECIFIED URINARY INCONTINENCE: ICD-10-CM

## 2018-05-22 DIAGNOSIS — Z98.890 OTHER SPECIFIED POSTPROCEDURAL STATES: ICD-10-CM

## 2018-05-22 DIAGNOSIS — Z87.891 PERSONAL HISTORY OF NICOTINE DEPENDENCE: ICD-10-CM

## 2018-05-22 DIAGNOSIS — G82.20 PARAPLEGIA, UNSPECIFIED: ICD-10-CM

## 2018-05-23 LAB
-  FLUCONAZOLE: SIGNIFICANT CHANGE UP
METHOD TYPE: SIGNIFICANT CHANGE UP

## 2018-05-24 ENCOUNTER — APPOINTMENT (OUTPATIENT)
Dept: OPHTHALMOLOGY | Facility: CLINIC | Age: 68
End: 2018-05-24
Payer: MEDICARE

## 2018-05-24 PROCEDURE — 99024 POSTOP FOLLOW-UP VISIT: CPT

## 2018-05-30 ENCOUNTER — RX RENEWAL (OUTPATIENT)
Age: 68
End: 2018-05-30

## 2018-05-30 ENCOUNTER — OUTPATIENT (OUTPATIENT)
Dept: OUTPATIENT SERVICES | Facility: HOSPITAL | Age: 68
LOS: 1 days | End: 2018-05-30
Payer: MEDICARE

## 2018-05-30 VITALS
HEIGHT: 67 IN | TEMPERATURE: 99 F | DIASTOLIC BLOOD PRESSURE: 74 MMHG | SYSTOLIC BLOOD PRESSURE: 131 MMHG | HEART RATE: 78 BPM | WEIGHT: 104.94 LBS | RESPIRATION RATE: 20 BRPM | OXYGEN SATURATION: 96 %

## 2018-05-30 DIAGNOSIS — Z01.818 ENCOUNTER FOR OTHER PREPROCEDURAL EXAMINATION: ICD-10-CM

## 2018-05-30 DIAGNOSIS — H54.40 BLINDNESS, ONE EYE, UNSPECIFIED EYE: ICD-10-CM

## 2018-05-30 DIAGNOSIS — Z98.89 OTHER SPECIFIED POSTPROCEDURAL STATES: Chronic | ICD-10-CM

## 2018-05-30 DIAGNOSIS — M54.5 LOW BACK PAIN: ICD-10-CM

## 2018-05-30 DIAGNOSIS — M53.9 DORSOPATHY, UNSPECIFIED: Chronic | ICD-10-CM

## 2018-05-30 DIAGNOSIS — M54.9 DORSALGIA, UNSPECIFIED: ICD-10-CM

## 2018-05-30 DIAGNOSIS — I10 ESSENTIAL (PRIMARY) HYPERTENSION: ICD-10-CM

## 2018-05-30 DIAGNOSIS — Z95.828 PRESENCE OF OTHER VASCULAR IMPLANTS AND GRAFTS: Chronic | ICD-10-CM

## 2018-05-30 DIAGNOSIS — D64.9 ANEMIA, UNSPECIFIED: ICD-10-CM

## 2018-05-30 DIAGNOSIS — Z94.7 CORNEAL TRANSPLANT STATUS: Chronic | ICD-10-CM

## 2018-05-30 DIAGNOSIS — I71.02 DISSECTION OF ABDOMINAL AORTA: ICD-10-CM

## 2018-05-30 LAB
ANION GAP SERPL CALC-SCNC: 9 MMOL/L — SIGNIFICANT CHANGE UP (ref 5–17)
BUN SERPL-MCNC: 17 MG/DL — SIGNIFICANT CHANGE UP (ref 7–23)
CALCIUM SERPL-MCNC: 9.6 MG/DL — SIGNIFICANT CHANGE UP (ref 8.4–10.5)
CHLORIDE SERPL-SCNC: 102 MMOL/L — SIGNIFICANT CHANGE UP (ref 96–108)
CO2 SERPL-SCNC: 30 MMOL/L — SIGNIFICANT CHANGE UP (ref 22–31)
CREAT SERPL-MCNC: 0.44 MG/DL — LOW (ref 0.5–1.3)
GLUCOSE SERPL-MCNC: 85 MG/DL — SIGNIFICANT CHANGE UP (ref 70–99)
HCT VFR BLD CALC: 36.5 % — SIGNIFICANT CHANGE UP (ref 34.5–45)
HGB BLD-MCNC: 11.2 G/DL — LOW (ref 11.5–15.5)
MCHC RBC-ENTMCNC: 25.5 PG — LOW (ref 27–34)
MCHC RBC-ENTMCNC: 30.7 GM/DL — LOW (ref 32–36)
MCV RBC AUTO: 83 FL — SIGNIFICANT CHANGE UP (ref 80–100)
PLATELET # BLD AUTO: 209 K/UL — SIGNIFICANT CHANGE UP (ref 150–400)
POTASSIUM SERPL-MCNC: 4.4 MMOL/L — SIGNIFICANT CHANGE UP (ref 3.5–5.3)
POTASSIUM SERPL-SCNC: 4.4 MMOL/L — SIGNIFICANT CHANGE UP (ref 3.5–5.3)
RBC # BLD: 4.4 M/UL — SIGNIFICANT CHANGE UP (ref 3.8–5.2)
RBC # FLD: 17.1 % — HIGH (ref 10.3–14.5)
SODIUM SERPL-SCNC: 141 MMOL/L — SIGNIFICANT CHANGE UP (ref 135–145)
WBC # BLD: 4.88 K/UL — SIGNIFICANT CHANGE UP (ref 3.8–10.5)
WBC # FLD AUTO: 4.88 K/UL — SIGNIFICANT CHANGE UP (ref 3.8–10.5)

## 2018-05-30 RX ORDER — CEFAZOLIN SODIUM 1 G
2000 VIAL (EA) INJECTION ONCE
Qty: 0 | Refills: 0 | Status: DISCONTINUED | OUTPATIENT
Start: 2018-06-13 | End: 2018-06-28

## 2018-05-30 RX ORDER — OXYCODONE AND ACETAMINOPHEN 5; 325 MG/1; MG/1
5-325 TABLET ORAL
Qty: 120 | Refills: 0 | Status: COMPLETED | COMMUNITY
Start: 2018-02-15 | End: 2018-05-30

## 2018-05-30 RX ORDER — SODIUM CHLORIDE 9 MG/ML
3 INJECTION INTRAMUSCULAR; INTRAVENOUS; SUBCUTANEOUS EVERY 8 HOURS
Qty: 0 | Refills: 0 | Status: DISCONTINUED | OUTPATIENT
Start: 2018-06-13 | End: 2018-06-28

## 2018-05-30 NOTE — H&P PST ADULT - NSANTHOSAYNRD_GEN_A_CORE
No. WILLIAM screening performed.  STOP BANG Legend: 0-2 = LOW Risk; 3-4 = INTERMEDIATE Risk; 5-8 = HIGH Risk

## 2018-05-30 NOTE — H&P PST ADULT - PSH
Disorder of spine  detethering  H/O Spinal surgery  laminectomies  History of corneal transplant  left corneal transplant on 5/21/2018  Presence of IVC filter    S/P aortic dissection repair  Type A Dissection repair

## 2018-05-30 NOTE — H&P PST ADULT - PROBLEM SELECTOR PLAN 2
continue current regiment  recent ekg to be obtained from cardiologist office continue current regiment  patient states has been evaluated by pcp/ cardiologist  recent ekg to be obtained from cardiologist office

## 2018-05-30 NOTE — H&P PST ADULT - HISTORY OF PRESENT ILLNESS
67 year old wheelchair bound female with h/o incomplete paraplegia, aortic aneurysm s/p repair 2016, HTN, anemia of chronic bleeding, HTN, TIA, hyperlipidemia, left eye blindness, is scheduled for stage 1 intrathecal baclofen trial for dorsalgia on 6/13/2018. Also patient had a h/o spontaneous spinal bleeding in 2014 s/p laminectomy, then 2015 started experiencing severe back pain with spasms, now scheduled for proposed procedure.

## 2018-05-30 NOTE — H&P PST ADULT - ACTIVITY
incomplete paraplegia, good upper extremities strength, able to transfer self from wheelchair to bed

## 2018-05-30 NOTE — H&P PST ADULT - PMH
Aortic dissection, abdominal  Type B Watched regularly  Aortic dissection, thoracic  Type A Repaired  Blindness of left eye    Chronic constipation    Hematoma  spinal  HTN (Hypertension)    Osteoporosis    PAD (peripheral artery disease)    Paraplegia    TIA (transient ischemic attack)    UTI (urinary tract infection)    Uveitis Anemia    Aortic dissection, abdominal  Type B Watched regularly  Aortic dissection, thoracic  Type A Repaired  Blindness of left eye    Chronic constipation    Dorsalgia of lumbar region    Hematoma  spinal  HTN (Hypertension)    Osteoporosis    PAD (peripheral artery disease)    Paraplegia    Self-catheterizes urinary bladder    TIA (transient ischemic attack)    UTI (urinary tract infection)    Uveitis

## 2018-06-04 ENCOUNTER — APPOINTMENT (OUTPATIENT)
Dept: PHYSICAL MEDICINE AND REHAB | Facility: CLINIC | Age: 68
End: 2018-06-04

## 2018-06-04 LAB
CULTURE RESULTS: SIGNIFICANT CHANGE UP
ORGANISM # SPEC MICROSCOPIC CNT: SIGNIFICANT CHANGE UP
SPECIMEN SOURCE: SIGNIFICANT CHANGE UP

## 2018-06-07 ENCOUNTER — APPOINTMENT (OUTPATIENT)
Dept: OPHTHALMOLOGY | Facility: CLINIC | Age: 68
End: 2018-06-07
Payer: MEDICARE

## 2018-06-07 PROCEDURE — 99024 POSTOP FOLLOW-UP VISIT: CPT

## 2018-06-07 RX ORDER — VALACYCLOVIR HYDROCHLORIDE 1 G/1
1 TABLET, FILM COATED ORAL
Qty: 1 | Refills: 5 | Status: ACTIVE | COMMUNITY
Start: 2018-05-14 | End: 1900-01-01

## 2018-06-07 RX ORDER — FLUCONAZOLE 200 MG/1
200 TABLET ORAL DAILY
Qty: 1 | Refills: 5 | Status: ACTIVE | COMMUNITY
Start: 2018-05-17 | End: 1900-01-01

## 2018-06-09 ENCOUNTER — MED ADMIN CHARGE (OUTPATIENT)
Age: 68
End: 2018-06-09

## 2018-06-09 ENCOUNTER — CHART COPY (OUTPATIENT)
Age: 68
End: 2018-06-09

## 2018-06-11 ENCOUNTER — OUTPATIENT (OUTPATIENT)
Dept: OUTPATIENT SERVICES | Facility: HOSPITAL | Age: 68
LOS: 1 days | Discharge: HOME | End: 2018-06-11
Payer: MEDICARE

## 2018-06-11 DIAGNOSIS — L89.153 PRESSURE ULCER OF SACRAL REGION, STAGE 3: ICD-10-CM

## 2018-06-11 DIAGNOSIS — Z95.828 PRESENCE OF OTHER VASCULAR IMPLANTS AND GRAFTS: Chronic | ICD-10-CM

## 2018-06-11 DIAGNOSIS — Z98.89 OTHER SPECIFIED POSTPROCEDURAL STATES: Chronic | ICD-10-CM

## 2018-06-11 DIAGNOSIS — M53.9 DORSOPATHY, UNSPECIFIED: Chronic | ICD-10-CM

## 2018-06-11 DIAGNOSIS — Z94.7 CORNEAL TRANSPLANT STATUS: Chronic | ICD-10-CM

## 2018-06-11 PROCEDURE — 99214 OFFICE O/P EST MOD 30 MIN: CPT

## 2018-06-11 PROCEDURE — G0463: CPT

## 2018-06-12 ENCOUNTER — TRANSCRIPTION ENCOUNTER (OUTPATIENT)
Age: 68
End: 2018-06-12

## 2018-06-12 DIAGNOSIS — L89.221 PRESSURE ULCER OF LEFT HIP, STAGE 1: ICD-10-CM

## 2018-06-12 DIAGNOSIS — Z88.8 ALLERGY STATUS TO OTHER DRUGS, MEDICAMENTS AND BIOLOGICAL SUBSTANCES STATUS: ICD-10-CM

## 2018-06-12 DIAGNOSIS — R32 UNSPECIFIED URINARY INCONTINENCE: ICD-10-CM

## 2018-06-12 DIAGNOSIS — Z98.890 OTHER SPECIFIED POSTPROCEDURAL STATES: ICD-10-CM

## 2018-06-12 DIAGNOSIS — G82.20 PARAPLEGIA, UNSPECIFIED: ICD-10-CM

## 2018-06-12 DIAGNOSIS — Z87.891 PERSONAL HISTORY OF NICOTINE DEPENDENCE: ICD-10-CM

## 2018-06-12 DIAGNOSIS — R63.6 UNDERWEIGHT: ICD-10-CM

## 2018-06-12 DIAGNOSIS — M25.551 PAIN IN RIGHT HIP: ICD-10-CM

## 2018-06-12 DIAGNOSIS — L89.153 PRESSURE ULCER OF SACRAL REGION, STAGE 3: ICD-10-CM

## 2018-06-12 DIAGNOSIS — Z79.899 OTHER LONG TERM (CURRENT) DRUG THERAPY: ICD-10-CM

## 2018-06-13 ENCOUNTER — OUTPATIENT (OUTPATIENT)
Dept: OUTPATIENT SERVICES | Facility: HOSPITAL | Age: 68
LOS: 1 days | End: 2018-06-13
Payer: MEDICARE

## 2018-06-13 ENCOUNTER — APPOINTMENT (OUTPATIENT)
Dept: NEUROSURGERY | Facility: HOSPITAL | Age: 68
End: 2018-06-13

## 2018-06-13 VITALS
SYSTOLIC BLOOD PRESSURE: 164 MMHG | OXYGEN SATURATION: 100 % | DIASTOLIC BLOOD PRESSURE: 84 MMHG | RESPIRATION RATE: 18 BRPM | HEART RATE: 89 BPM

## 2018-06-13 VITALS
DIASTOLIC BLOOD PRESSURE: 102 MMHG | SYSTOLIC BLOOD PRESSURE: 148 MMHG | HEIGHT: 67 IN | OXYGEN SATURATION: 95 % | HEART RATE: 132 BPM | WEIGHT: 104.94 LBS | RESPIRATION RATE: 19 BRPM | TEMPERATURE: 99 F

## 2018-06-13 DIAGNOSIS — M53.9 DORSOPATHY, UNSPECIFIED: Chronic | ICD-10-CM

## 2018-06-13 DIAGNOSIS — Z98.89 OTHER SPECIFIED POSTPROCEDURAL STATES: Chronic | ICD-10-CM

## 2018-06-13 DIAGNOSIS — Z94.7 CORNEAL TRANSPLANT STATUS: Chronic | ICD-10-CM

## 2018-06-13 DIAGNOSIS — M54.9 DORSALGIA, UNSPECIFIED: ICD-10-CM

## 2018-06-13 DIAGNOSIS — Z95.828 PRESENCE OF OTHER VASCULAR IMPLANTS AND GRAFTS: Chronic | ICD-10-CM

## 2018-06-13 DIAGNOSIS — Z01.818 ENCOUNTER FOR OTHER PREPROCEDURAL EXAMINATION: ICD-10-CM

## 2018-06-13 PROCEDURE — G8979: CPT | Mod: CK

## 2018-06-13 PROCEDURE — 97161 PT EVAL LOW COMPLEX 20 MIN: CPT

## 2018-06-13 PROCEDURE — 62323 NJX INTERLAMINAR LMBR/SAC: CPT

## 2018-06-13 PROCEDURE — 99232 SBSQ HOSP IP/OBS MODERATE 35: CPT

## 2018-06-13 PROCEDURE — 62323 NJX INTERLAMINAR LMBR/SAC: CPT | Mod: XP

## 2018-06-13 PROCEDURE — 76000 FLUOROSCOPY <1 HR PHYS/QHP: CPT

## 2018-06-13 PROCEDURE — 93010 ELECTROCARDIOGRAM REPORT: CPT

## 2018-06-13 PROCEDURE — G8978: CPT | Mod: CK

## 2018-06-13 PROCEDURE — 93005 ELECTROCARDIOGRAM TRACING: CPT

## 2018-06-13 PROCEDURE — G8980: CPT | Mod: CK

## 2018-06-13 RX ORDER — DEXTROSE MONOHYDRATE, SODIUM CHLORIDE, AND POTASSIUM CHLORIDE 50; .745; 4.5 G/1000ML; G/1000ML; G/1000ML
1000 INJECTION, SOLUTION INTRAVENOUS
Qty: 0 | Refills: 0 | Status: DISCONTINUED | OUTPATIENT
Start: 2018-06-13 | End: 2018-06-28

## 2018-06-13 RX ORDER — ATORVASTATIN CALCIUM 80 MG/1
40 TABLET, FILM COATED ORAL AT BEDTIME
Qty: 0 | Refills: 0 | Status: DISCONTINUED | OUTPATIENT
Start: 2018-06-13 | End: 2018-06-28

## 2018-06-13 RX ORDER — LABETALOL HCL 100 MG
200 TABLET ORAL
Qty: 0 | Refills: 0 | Status: DISCONTINUED | OUTPATIENT
Start: 2018-06-13 | End: 2018-06-28

## 2018-06-13 RX ORDER — PREGABALIN 225 MG/1
500 CAPSULE ORAL DAILY
Qty: 0 | Refills: 0 | Status: DISCONTINUED | OUTPATIENT
Start: 2018-06-13 | End: 2018-06-28

## 2018-06-13 RX ORDER — LIDOCAINE HCL 20 MG/ML
0.2 VIAL (ML) INJECTION ONCE
Qty: 0 | Refills: 0 | Status: COMPLETED | OUTPATIENT
Start: 2018-06-13 | End: 2018-06-13

## 2018-06-13 RX ORDER — BACLOFEN 100 %
50 POWDER (GRAM) MISCELLANEOUS ONCE
Qty: 0 | Refills: 0 | Status: DISCONTINUED | OUTPATIENT
Start: 2018-06-13 | End: 2018-06-28

## 2018-06-13 RX ORDER — CHOLECALCIFEROL (VITAMIN D3) 125 MCG
1000 CAPSULE ORAL DAILY
Qty: 0 | Refills: 0 | Status: DISCONTINUED | OUTPATIENT
Start: 2018-06-13 | End: 2018-06-28

## 2018-06-13 RX ORDER — FOLIC ACID 0.8 MG
1 TABLET ORAL DAILY
Qty: 0 | Refills: 0 | Status: DISCONTINUED | OUTPATIENT
Start: 2018-06-13 | End: 2018-06-28

## 2018-06-13 RX ORDER — SODIUM CHLORIDE 9 MG/ML
250 INJECTION INTRAMUSCULAR; INTRAVENOUS; SUBCUTANEOUS ONCE
Qty: 0 | Refills: 0 | Status: COMPLETED | OUTPATIENT
Start: 2018-06-13 | End: 2018-06-13

## 2018-06-13 RX ORDER — ASCORBIC ACID 60 MG
500 TABLET,CHEWABLE ORAL DAILY
Qty: 0 | Refills: 0 | Status: DISCONTINUED | OUTPATIENT
Start: 2018-06-13 | End: 2018-06-28

## 2018-06-13 RX ORDER — HYDROMORPHONE HYDROCHLORIDE 2 MG/ML
0.25 INJECTION INTRAMUSCULAR; INTRAVENOUS; SUBCUTANEOUS ONCE
Qty: 0 | Refills: 0 | Status: DISCONTINUED | OUTPATIENT
Start: 2018-06-13 | End: 2018-06-13

## 2018-06-13 RX ORDER — ONDANSETRON 8 MG/1
4 TABLET, FILM COATED ORAL ONCE
Qty: 0 | Refills: 0 | Status: DISCONTINUED | OUTPATIENT
Start: 2018-06-13 | End: 2018-06-13

## 2018-06-13 RX ORDER — HYDROMORPHONE HYDROCHLORIDE 2 MG/ML
0.5 INJECTION INTRAMUSCULAR; INTRAVENOUS; SUBCUTANEOUS ONCE
Qty: 0 | Refills: 0 | Status: DISCONTINUED | OUTPATIENT
Start: 2018-06-13 | End: 2018-06-28

## 2018-06-13 RX ORDER — LABETALOL HCL 100 MG
10 TABLET ORAL ONCE
Qty: 0 | Refills: 0 | Status: COMPLETED | OUTPATIENT
Start: 2018-06-13 | End: 2018-06-13

## 2018-06-13 RX ORDER — DULOXETINE HYDROCHLORIDE 30 MG/1
20 CAPSULE, DELAYED RELEASE ORAL DAILY
Qty: 0 | Refills: 0 | Status: DISCONTINUED | OUTPATIENT
Start: 2018-06-13 | End: 2018-06-28

## 2018-06-13 RX ADMIN — HYDROMORPHONE HYDROCHLORIDE 0.25 MILLIGRAM(S): 2 INJECTION INTRAMUSCULAR; INTRAVENOUS; SUBCUTANEOUS at 14:20

## 2018-06-13 RX ADMIN — SODIUM CHLORIDE 3 MILLILITER(S): 9 INJECTION INTRAMUSCULAR; INTRAVENOUS; SUBCUTANEOUS at 09:09

## 2018-06-13 RX ADMIN — SODIUM CHLORIDE 750 MILLILITER(S): 9 INJECTION INTRAMUSCULAR; INTRAVENOUS; SUBCUTANEOUS at 09:10

## 2018-06-13 RX ADMIN — Medication 0.2 MILLILITER(S): at 09:10

## 2018-06-13 RX ADMIN — Medication 10 MILLIGRAM(S): at 16:35

## 2018-06-13 NOTE — PHYSICAL THERAPY INITIAL EVALUATION ADULT - ADDITIONAL COMMENTS
Modified Elmer scale: L hip abd: 2, L hip flex/ext: 1+, L knee: 1, L ankle: 1 with 5-7 beats of unsustained clonus at end range; R hip abd: 3, R hip flex/ext: 2, R knee: 3 (at end range), R ankle: 1+ with 7 beats of unsustained clonus at end range

## 2018-06-13 NOTE — PHYSICAL THERAPY INITIAL EVALUATION ADULT - MANUAL MUSCLE TESTING RESULTS, REHAB EVAL
LLE hip/ankle grossly 3 within available range, LLE hip 2-, LLE ankle 3-, Bilateral knees difficult to assess due to tone

## 2018-06-13 NOTE — PHYSICAL THERAPY INITIAL EVALUATION ADULT - PERTINENT HX OF CURRENT PROBLEM, REHAB EVAL
68y/o wheelchair bound female with h/o incomplete paraplegia, aortic aneurysm s/p repair 2016, HTN, anemia of chronic bleeding, HTN, TIA, hyperlipidemia, L eye blindness, h/o spontaneous spinal bleeding in 2014 s/p laminectomy, then 2015 started experiencing severe back pain with spasms, now scheduled for intrathecal baclofen trial for dorsalgia on 6/13/2018

## 2018-06-13 NOTE — ASU DISCHARGE PLAN (ADULT/PEDIATRIC). - MEDICATION SUMMARY - MEDICATIONS TO TAKE
I will START or STAY ON the medications listed below when I get home from the hospital:    acetaminophen 325 mg oral tablet  -- 2 tab(s) by mouth every 6 hours, As needed, mild pain  -- Indication: For Pain    oxyCODONE-acetaminophen 5 mg-325 mg oral tablet  -- 1 tab(s) by mouth every 4 hours, As needed, Severe Pain (7 - 10)  -- Indication: For Pain     Cymbalta 20 mg oral delayed release capsule  -- 1 cap(s) by mouth 2 times a day  -- Indication: For Depression     Diflucan  -- 500 mg daily  -- Indication: For Home med     Lipitor 40 mg oral tablet  -- 1 tab(s) by mouth once a day  -- Indication: For HLD    labetalol 100 mg oral tablet  -- 2 tab(s) by mouth 2 times a day  -- Indication: For HTN    baclofen 20 mg oral tablet  -- 1 tab(s) by mouth 4 times a day  -- Indication: For Spasticity    dexamethasone/neomycin/polymyxin B 1 mg-3.5 mg-10,000 units/mL ophthalmic suspension  -- 1 drop(s) to each affected eye every 6 hours  -- Indication: For Home med    Vitamin C 500 mg oral tablet  -- 1 tab(s) by mouth once a day  -- Indication: For suppliment     folic acid 1 mg oral tablet  -- 1 tab(s) by mouth once a day  -- Indication: For suppliment     Vitamin B-12 500 mcg oral tablet  -- 1 tab(s) by mouth once a day  -- Indication: For suppliment     Vitamin D3 1000 intl units oral capsule  -- 1 cap(s) by mouth once a day  -- Indication: For suppliment

## 2018-06-13 NOTE — ASU DISCHARGE PLAN (ADULT/PEDIATRIC). - ITEMS TO FOLLOWUP WITH YOUR PHYSICIAN'S
Call Neurosurgery Dr Vasquez for appointment in 1 week.  Followup with your Private MD in 1-2 weeks.  Return to Emergency Department or contact your Neurosurgeon if any changes in mental status, weakness, numbness or tingling of extremities; difficulty swallowing; drainage or redness of wound, fever; pain in legs; difficulty urinating or constipation.  Donot restart your Aspirin or take any Motrin/NSAIDS until checking with your Neurosurgeon.

## 2018-06-13 NOTE — PHYSICAL THERAPY INITIAL EVALUATION ADULT - RANGE OF MOTION EXAMINATION, REHAB EVAL
L hip flex WFL, L hip ext -20deg, L hip add WFL, L hip abd -20deg, L knee flex WFL, L knee ext -40deg, L ankle WFL. R hip flex -20deg, R hip ext -30deg, R hip add WFL, R hip abd -30deg, R knee flex WFL, R knee ext -60deg, R ankle WFL

## 2018-06-13 NOTE — ASU DISCHARGE PLAN (ADULT/PEDIATRIC). - NOTIFY
Persistent Nausea and Vomiting/Bleeding that does not stop/Pain not relieved by Medications/Numbness, color, or temperature change to extremity

## 2018-06-13 NOTE — PROGRESS NOTE ADULT - SUBJECTIVE AND OBJECTIVE BOX
Patient is a 68 yo- with a history of spastic paraplegia - patient status post ITB trial today.  Patient is still in significant pain but noted to have improvement of spasticity by PT   PE  In some distress due to pain  LE w AW 2 spasticity of KFs and Hip Adductors  No edema  Alert  Imp: S/P ITB Trial with spasticity- some improvement noted w ITB trial  Recs:  Discussed with patient and her  goals of care and plan should they go forward with pump. They state they will likely have pump implanted.  Will f/u with myself and Dr. Perez for ITB and other SCI management.

## 2018-06-13 NOTE — PHYSICAL THERAPY INITIAL EVALUATION ADULT - GENERAL OBSERVATIONS, REHAB EVAL
received supine with head of bed elevated (in SDA) +iv, with BLE in ~90deg hip flexion, 120deg knee flexion, +pillow between knees, BLEs rotated to L

## 2018-06-13 NOTE — BRIEF OPERATIVE NOTE - PROCEDURE
<<-----Click on this checkbox to enter Procedure Baclofen intrathecal trial  06/13/2018    Active  RPRUITT

## 2018-06-16 ENCOUNTER — RX RENEWAL (OUTPATIENT)
Age: 68
End: 2018-06-16

## 2018-06-18 ENCOUNTER — RX RENEWAL (OUTPATIENT)
Age: 68
End: 2018-06-18

## 2018-06-19 ENCOUNTER — APPOINTMENT (OUTPATIENT)
Dept: NEUROSURGERY | Facility: CLINIC | Age: 68
End: 2018-06-19

## 2018-06-21 ENCOUNTER — RX RENEWAL (OUTPATIENT)
Age: 68
End: 2018-06-21

## 2018-06-21 ENCOUNTER — APPOINTMENT (OUTPATIENT)
Dept: OPHTHALMOLOGY | Facility: CLINIC | Age: 68
End: 2018-06-21

## 2018-06-22 ENCOUNTER — MEDICATION RENEWAL (OUTPATIENT)
Age: 68
End: 2018-06-22

## 2018-06-26 ENCOUNTER — TRANSCRIPTION ENCOUNTER (OUTPATIENT)
Age: 68
End: 2018-06-26

## 2018-06-27 ENCOUNTER — APPOINTMENT (OUTPATIENT)
Dept: NEUROSURGERY | Facility: HOSPITAL | Age: 68
End: 2018-06-27

## 2018-06-27 ENCOUNTER — OUTPATIENT (OUTPATIENT)
Dept: OUTPATIENT SERVICES | Facility: HOSPITAL | Age: 68
LOS: 1 days | End: 2018-06-27
Payer: MEDICARE

## 2018-06-27 VITALS
SYSTOLIC BLOOD PRESSURE: 119 MMHG | HEART RATE: 74 BPM | WEIGHT: 104.94 LBS | TEMPERATURE: 97 F | OXYGEN SATURATION: 97 % | DIASTOLIC BLOOD PRESSURE: 72 MMHG | RESPIRATION RATE: 18 BRPM | HEIGHT: 67 IN

## 2018-06-27 VITALS
OXYGEN SATURATION: 93 % | RESPIRATION RATE: 14 BRPM | SYSTOLIC BLOOD PRESSURE: 142 MMHG | HEART RATE: 115 BPM | DIASTOLIC BLOOD PRESSURE: 76 MMHG

## 2018-06-27 DIAGNOSIS — Z94.7 CORNEAL TRANSPLANT STATUS: Chronic | ICD-10-CM

## 2018-06-27 DIAGNOSIS — Z98.89 OTHER SPECIFIED POSTPROCEDURAL STATES: Chronic | ICD-10-CM

## 2018-06-27 DIAGNOSIS — M54.9 DORSALGIA, UNSPECIFIED: ICD-10-CM

## 2018-06-27 DIAGNOSIS — M53.9 DORSOPATHY, UNSPECIFIED: Chronic | ICD-10-CM

## 2018-06-27 DIAGNOSIS — Z95.828 PRESENCE OF OTHER VASCULAR IMPLANTS AND GRAFTS: Chronic | ICD-10-CM

## 2018-06-27 PROCEDURE — 62362 IMPLANT SPINE INFUSION PUMP: CPT

## 2018-06-27 PROCEDURE — 85027 COMPLETE CBC AUTOMATED: CPT

## 2018-06-27 PROCEDURE — 76000 FLUOROSCOPY <1 HR PHYS/QHP: CPT

## 2018-06-27 PROCEDURE — 77003 FLUOROGUIDE FOR SPINE INJECT: CPT

## 2018-06-27 PROCEDURE — 62350 IMPLANT SPINAL CANAL CATH: CPT

## 2018-06-27 PROCEDURE — G0463: CPT

## 2018-06-27 PROCEDURE — C1755: CPT

## 2018-06-27 PROCEDURE — C1772: CPT

## 2018-06-27 PROCEDURE — 80048 BASIC METABOLIC PNL TOTAL CA: CPT

## 2018-06-27 RX ORDER — ZOLPIDEM TARTRATE 10 MG/1
1 TABLET ORAL
Qty: 15 | Refills: 0
Start: 2018-06-27 | End: 2018-07-11

## 2018-06-27 RX ORDER — PREGABALIN 225 MG/1
500 CAPSULE ORAL DAILY
Qty: 0 | Refills: 0 | Status: DISCONTINUED | OUTPATIENT
Start: 2018-06-27 | End: 2018-07-12

## 2018-06-27 RX ORDER — DULOXETINE HYDROCHLORIDE 30 MG/1
20 CAPSULE, DELAYED RELEASE ORAL EVERY 12 HOURS
Qty: 0 | Refills: 0 | Status: DISCONTINUED | OUTPATIENT
Start: 2018-06-27 | End: 2018-07-12

## 2018-06-27 RX ORDER — BACLOFEN 100 %
10000 POWDER (GRAM) MISCELLANEOUS ONCE
Qty: 0 | Refills: 0 | Status: DISCONTINUED | OUTPATIENT
Start: 2018-06-27 | End: 2018-06-27

## 2018-06-27 RX ORDER — OXYCODONE AND ACETAMINOPHEN 5; 325 MG/1; MG/1
1 TABLET ORAL EVERY 4 HOURS
Qty: 0 | Refills: 0 | Status: DISCONTINUED | OUTPATIENT
Start: 2018-06-27 | End: 2018-06-27

## 2018-06-27 RX ORDER — FOLIC ACID 0.8 MG
1 TABLET ORAL DAILY
Qty: 0 | Refills: 0 | Status: DISCONTINUED | OUTPATIENT
Start: 2018-06-27 | End: 2018-07-12

## 2018-06-27 RX ORDER — CHOLECALCIFEROL (VITAMIN D3) 125 MCG
1000 CAPSULE ORAL DAILY
Qty: 0 | Refills: 0 | Status: DISCONTINUED | OUTPATIENT
Start: 2018-06-27 | End: 2018-07-12

## 2018-06-27 RX ORDER — ASCORBIC ACID 60 MG
500 TABLET,CHEWABLE ORAL DAILY
Qty: 0 | Refills: 0 | Status: DISCONTINUED | OUTPATIENT
Start: 2018-06-27 | End: 2018-07-12

## 2018-06-27 RX ORDER — SODIUM CHLORIDE 9 MG/ML
1000 INJECTION, SOLUTION INTRAVENOUS
Qty: 0 | Refills: 0 | Status: DISCONTINUED | OUTPATIENT
Start: 2018-06-27 | End: 2018-07-12

## 2018-06-27 RX ORDER — LABETALOL HCL 100 MG
200 TABLET ORAL
Qty: 0 | Refills: 0 | Status: DISCONTINUED | OUTPATIENT
Start: 2018-06-27 | End: 2018-07-12

## 2018-06-27 RX ORDER — NEOMYCIN/POLYMYXIN B/DEXAMETHA 0.1 %
1 SUSPENSION, DROPS(FINAL DOSAGE FORM)(ML) OPHTHALMIC (EYE) EVERY 6 HOURS
Qty: 0 | Refills: 0 | Status: DISCONTINUED | OUTPATIENT
Start: 2018-06-27 | End: 2018-07-12

## 2018-06-27 RX ORDER — BACLOFEN 100 %
20 POWDER (GRAM) MISCELLANEOUS
Qty: 0 | Refills: 0 | Status: DISCONTINUED | OUTPATIENT
Start: 2018-06-27 | End: 2018-07-12

## 2018-06-27 RX ORDER — ATORVASTATIN CALCIUM 80 MG/1
40 TABLET, FILM COATED ORAL AT BEDTIME
Qty: 0 | Refills: 0 | Status: DISCONTINUED | OUTPATIENT
Start: 2018-06-27 | End: 2018-07-12

## 2018-06-27 RX ORDER — ACETAMINOPHEN 500 MG
650 TABLET ORAL EVERY 6 HOURS
Qty: 0 | Refills: 0 | Status: DISCONTINUED | OUTPATIENT
Start: 2018-06-27 | End: 2018-07-12

## 2018-06-27 RX ORDER — BACLOFEN 100 %
40000 POWDER (GRAM) MISCELLANEOUS ONCE
Qty: 0 | Refills: 0 | Status: DISCONTINUED | OUTPATIENT
Start: 2018-06-27 | End: 2018-06-27

## 2018-06-27 RX ORDER — HYDROMORPHONE HYDROCHLORIDE 2 MG/ML
0.5 INJECTION INTRAMUSCULAR; INTRAVENOUS; SUBCUTANEOUS
Qty: 0 | Refills: 0 | Status: DISCONTINUED | OUTPATIENT
Start: 2018-06-27 | End: 2018-06-27

## 2018-06-27 RX ORDER — SODIUM CHLORIDE 9 MG/ML
3 INJECTION INTRAMUSCULAR; INTRAVENOUS; SUBCUTANEOUS EVERY 8 HOURS
Qty: 0 | Refills: 0 | Status: DISCONTINUED | OUTPATIENT
Start: 2018-06-27 | End: 2018-06-27

## 2018-06-27 RX ADMIN — HYDROMORPHONE HYDROCHLORIDE 0.5 MILLIGRAM(S): 2 INJECTION INTRAMUSCULAR; INTRAVENOUS; SUBCUTANEOUS at 15:46

## 2018-06-27 RX ADMIN — HYDROMORPHONE HYDROCHLORIDE 0.5 MILLIGRAM(S): 2 INJECTION INTRAMUSCULAR; INTRAVENOUS; SUBCUTANEOUS at 15:47

## 2018-06-27 RX ADMIN — HYDROMORPHONE HYDROCHLORIDE 0.5 MILLIGRAM(S): 2 INJECTION INTRAMUSCULAR; INTRAVENOUS; SUBCUTANEOUS at 16:14

## 2018-06-27 RX ADMIN — HYDROMORPHONE HYDROCHLORIDE 0.5 MILLIGRAM(S): 2 INJECTION INTRAMUSCULAR; INTRAVENOUS; SUBCUTANEOUS at 15:32

## 2018-06-27 RX ADMIN — HYDROMORPHONE HYDROCHLORIDE 0.5 MILLIGRAM(S): 2 INJECTION INTRAMUSCULAR; INTRAVENOUS; SUBCUTANEOUS at 15:13

## 2018-06-27 RX ADMIN — HYDROMORPHONE HYDROCHLORIDE 0.5 MILLIGRAM(S): 2 INJECTION INTRAMUSCULAR; INTRAVENOUS; SUBCUTANEOUS at 16:07

## 2018-06-27 NOTE — ASU DISCHARGE PLAN (ADULT/PEDIATRIC). - NOTIFY
Pain not relieved by Medications/Numbness, tingling/Bleeding that does not stop/Swelling that continues/Persistent Nausea and Vomiting

## 2018-06-27 NOTE — BRIEF OPERATIVE NOTE - PROCEDURE
<<-----Click on this checkbox to enter Procedure Baclofen pump implantation  06/27/2018    Active  MICHELLEITT

## 2018-06-27 NOTE — ASU DISCHARGE PLAN (ADULT/PEDIATRIC). - INSTRUCTIONS
No strenuous activity. No heavy lifting. Do not return to work until cleared by physician. No driving until cleared by physician.  You may shower on the 3rd day after you surgery.  No soaking in tub,  Do not remove steri strips. Do not apply any ointments to incision.

## 2018-06-27 NOTE — ASU DISCHARGE PLAN (ADULT/PEDIATRIC). - MEDICATION SUMMARY - MEDICATIONS TO TAKE
I will START or STAY ON the medications listed below when I get home from the hospital:    acetaminophen 325 mg oral tablet  -- 2 tab(s) by mouth every 6 hours, As needed, mild pain  -- Indication: For Dorsalgia    oxyCODONE-acetaminophen 5 mg-325 mg oral tablet  -- 1 tab(s) by mouth every 4 hours, As needed, Severe Pain (7 - 10)  -- Indication: For Dorsalgia    Cymbalta 20 mg oral delayed release capsule  -- 1 cap(s) by mouth 2 times a day  -- Indication: For Depression    Diflucan  -- 500 mg daily  -- Indication: For Antifungal    Lipitor 40 mg oral tablet  -- 1 tab(s) by mouth once a day  -- Indication: For HLD    labetalol 100 mg oral tablet  -- 2 tab(s) by mouth 2 times a day  -- Indication: For HTN    baclofen 20 mg oral tablet  -- 1 tab(s) by mouth 4 times a day  -- Indication: For spasticity    dexamethasone/neomycin/polymyxin B 1 mg-3.5 mg-10,000 units/mL ophthalmic suspension  -- 1 drop(s) to each affected eye every 6 hours  -- Indication: For Home medication    Vitamin C 500 mg oral tablet  -- 1 tab(s) by mouth once a day  -- Indication: For Home medication    folic acid 1 mg oral tablet  -- 1 tab(s) by mouth once a day  -- Indication: For Home medication    Vitamin B-12 500 mcg oral tablet  -- 1 tab(s) by mouth once a day  -- Indication: For home medication    Vitamin D3 1000 intl units oral capsule  -- 1 cap(s) by mouth once a day  -- Indication: For home medication

## 2018-06-27 NOTE — ASU DISCHARGE PLAN (ADULT/PEDIATRIC). - ITEMS TO FOLLOWUP WITH YOUR PHYSICIAN'S
Call Neurosurgery Dr Vasquez for appointment in 1 week for wound check and staple removal.  Followup with your Private MD in 1-2 weeks.  Return to Emergency Department or contact your Neurosurgeon if any changes in mental status, weakness, numbness or tingling of extremities; difficulty swallowing; drainage or redness of wound, fever; pain in legs.  Do not restart your Aspirin or take any Motrin/NSAIDS until checking with your Neurosurgeon.

## 2018-07-06 ENCOUNTER — APPOINTMENT (OUTPATIENT)
Dept: PHYSICAL MEDICINE AND REHAB | Facility: CLINIC | Age: 68
End: 2018-07-06
Payer: MEDICARE

## 2018-07-06 ENCOUNTER — APPOINTMENT (OUTPATIENT)
Dept: NEUROSURGERY | Facility: CLINIC | Age: 68
End: 2018-07-06
Payer: MEDICARE

## 2018-07-06 VITALS
HEART RATE: 79 BPM | WEIGHT: 102 LBS | DIASTOLIC BLOOD PRESSURE: 64 MMHG | SYSTOLIC BLOOD PRESSURE: 112 MMHG | BODY MASS INDEX: 16.01 KG/M2 | HEIGHT: 67 IN

## 2018-07-06 VITALS — DIASTOLIC BLOOD PRESSURE: 75 MMHG | HEART RATE: 69 BPM | OXYGEN SATURATION: 97 % | SYSTOLIC BLOOD PRESSURE: 145 MMHG

## 2018-07-06 DIAGNOSIS — M79.2 NEURALGIA AND NEURITIS, UNSPECIFIED: ICD-10-CM

## 2018-07-06 PROCEDURE — 99024 POSTOP FOLLOW-UP VISIT: CPT

## 2018-07-06 PROCEDURE — 62368 ANALYZE SP INF PUMP W/REPROG: CPT

## 2018-07-06 PROCEDURE — 99213 OFFICE O/P EST LOW 20 MIN: CPT | Mod: 25

## 2018-07-06 RX ORDER — GABAPENTIN 300 MG/1
300 CAPSULE ORAL 3 TIMES DAILY
Qty: 90 | Refills: 1 | Status: ACTIVE | COMMUNITY
Start: 2018-07-06 | End: 1900-01-01

## 2018-07-09 ENCOUNTER — TRANSCRIPTION ENCOUNTER (OUTPATIENT)
Age: 68
End: 2018-07-09

## 2018-07-11 ENCOUNTER — APPOINTMENT (OUTPATIENT)
Dept: GERIATRICS | Facility: CLINIC | Age: 68
End: 2018-07-11
Payer: MEDICARE

## 2018-07-11 VITALS
SYSTOLIC BLOOD PRESSURE: 140 MMHG | WEIGHT: 113.6 LBS | DIASTOLIC BLOOD PRESSURE: 80 MMHG | TEMPERATURE: 98.6 F | HEART RATE: 82 BPM | BODY MASS INDEX: 17.79 KG/M2 | OXYGEN SATURATION: 95 %

## 2018-07-11 DIAGNOSIS — G82.22 PARAPLEGIA, INCOMPLETE: ICD-10-CM

## 2018-07-11 PROCEDURE — 99215 OFFICE O/P EST HI 40 MIN: CPT

## 2018-07-11 RX ORDER — OXYCODONE AND ACETAMINOPHEN 5; 325 MG/1; MG/1
5-325 TABLET ORAL
Qty: 120 | Refills: 0 | Status: DISCONTINUED | COMMUNITY
Start: 2018-02-15 | End: 2018-07-11

## 2018-07-11 RX ORDER — DIAZEPAM 5 MG/1
5 TABLET ORAL EVERY 6 HOURS
Qty: 60 | Refills: 0 | Status: DISCONTINUED | COMMUNITY
Start: 2018-04-03 | End: 2018-07-11

## 2018-07-19 ENCOUNTER — APPOINTMENT (OUTPATIENT)
Dept: OPHTHALMOLOGY | Facility: CLINIC | Age: 68
End: 2018-07-19
Payer: MEDICARE

## 2018-07-19 DIAGNOSIS — Z94.7 CORNEAL TRANSPLANT STATUS: ICD-10-CM

## 2018-07-19 PROBLEM — M81.0 AGE-RELATED OSTEOPOROSIS WITHOUT CURRENT PATHOLOGICAL FRACTURE: Chronic | Status: ACTIVE | Noted: 2018-05-14

## 2018-07-19 PROBLEM — I73.9 PERIPHERAL VASCULAR DISEASE, UNSPECIFIED: Chronic | Status: ACTIVE | Noted: 2018-05-14

## 2018-07-19 PROBLEM — H20.9 UNSPECIFIED IRIDOCYCLITIS: Chronic | Status: ACTIVE | Noted: 2018-05-14

## 2018-07-19 PROBLEM — Z78.9 OTHER SPECIFIED HEALTH STATUS: Chronic | Status: ACTIVE | Noted: 2018-05-30

## 2018-07-19 PROCEDURE — 99024 POSTOP FOLLOW-UP VISIT: CPT

## 2018-07-19 RX ORDER — PREDNISOLONE ACETATE 10 MG/ML
1 SUSPENSION/ DROPS OPHTHALMIC
Qty: 1 | Refills: 5 | Status: ACTIVE | COMMUNITY
Start: 2018-07-19 | End: 1900-01-01

## 2018-07-24 ENCOUNTER — MEDICATION RENEWAL (OUTPATIENT)
Age: 68
End: 2018-07-24

## 2018-08-03 ENCOUNTER — APPOINTMENT (OUTPATIENT)
Dept: PHYSICAL MEDICINE AND REHAB | Facility: CLINIC | Age: 68
End: 2018-08-03

## 2018-08-07 ENCOUNTER — TRANSCRIPTION ENCOUNTER (OUTPATIENT)
Age: 68
End: 2018-08-07

## 2018-08-13 ENCOUNTER — APPOINTMENT (OUTPATIENT)
Dept: PHYSICAL MEDICINE AND REHAB | Facility: CLINIC | Age: 68
End: 2018-08-13

## 2018-08-15 ENCOUNTER — RX RENEWAL (OUTPATIENT)
Age: 68
End: 2018-08-15

## 2018-08-17 ENCOUNTER — APPOINTMENT (OUTPATIENT)
Dept: NEUROSURGERY | Facility: CLINIC | Age: 68
End: 2018-08-17

## 2018-08-20 ENCOUNTER — RX RENEWAL (OUTPATIENT)
Age: 68
End: 2018-08-20

## 2018-08-31 ENCOUNTER — APPOINTMENT (OUTPATIENT)
Dept: PHYSICAL MEDICINE AND REHAB | Facility: CLINIC | Age: 68
End: 2018-08-31

## 2018-09-03 PROBLEM — L89.153 DECUBITUS ULCER OF SACRAL REGION, STAGE 3: Status: ACTIVE | Noted: 2017-06-15

## 2018-09-05 ENCOUNTER — RX RENEWAL (OUTPATIENT)
Age: 68
End: 2018-09-05

## 2018-09-05 ENCOUNTER — APPOINTMENT (OUTPATIENT)
Dept: PHYSICAL MEDICINE AND REHAB | Facility: CLINIC | Age: 68
End: 2018-09-05
Payer: MEDICARE

## 2018-09-05 VITALS — HEART RATE: 82 BPM | DIASTOLIC BLOOD PRESSURE: 73 MMHG | OXYGEN SATURATION: 96 % | SYSTOLIC BLOOD PRESSURE: 127 MMHG

## 2018-09-05 PROCEDURE — 99213 OFFICE O/P EST LOW 20 MIN: CPT | Mod: 25

## 2018-09-05 PROCEDURE — 95991 SPIN/BRAIN PUMP REFIL & MAIN: CPT

## 2018-09-05 RX ORDER — GABAPENTIN 400 MG/1
400 CAPSULE ORAL 3 TIMES DAILY
Qty: 90 | Refills: 1 | Status: ACTIVE | COMMUNITY
Start: 2018-09-05 | End: 1900-01-01

## 2018-09-14 NOTE — DISCHARGE NOTE ADULT - CARE PLAN
Mo, liaison from [facility] [phone], informed SW they are able to accept pt. 68 [facility] [phone]    [facility] 79 orders and F2F have been sent to [facility] via [employer]. Anticipated discharge Saturday 9/15.     Monty Nevarez Principal Discharge DX:	Tachycardia  Goal:	Resolved in SR now  Assessment and plan of treatment:	please follow up, with DR Branham  Secondary Diagnosis:	Decubitus ulcer of back, unspecified ulcer stage  Assessment and plan of treatment:	please follow up with Wound care center  Secondary Diagnosis:	HTN (hypertension)  Assessment and plan of treatment:	Follow up with your medical doctor to establish long term blood pressure treatment goals.  Secondary Diagnosis:	Paraplegia  Assessment and plan of treatment:	please follow up with PCP   Home PT

## 2018-09-17 ENCOUNTER — APPOINTMENT (OUTPATIENT)
Dept: PHYSICAL MEDICINE AND REHAB | Facility: CLINIC | Age: 68
End: 2018-09-17

## 2018-09-17 ENCOUNTER — RX RENEWAL (OUTPATIENT)
Age: 68
End: 2018-09-17

## 2018-09-20 ENCOUNTER — INPATIENT (INPATIENT)
Facility: HOSPITAL | Age: 68
LOS: 3 days | Discharge: HOME | End: 2018-09-24
Attending: INTERNAL MEDICINE | Admitting: INTERNAL MEDICINE
Payer: MEDICARE

## 2018-09-20 VITALS
OXYGEN SATURATION: 100 % | DIASTOLIC BLOOD PRESSURE: 60 MMHG | TEMPERATURE: 98 F | HEIGHT: 62 IN | RESPIRATION RATE: 19 BRPM | WEIGHT: 89.95 LBS | HEART RATE: 130 BPM | SYSTOLIC BLOOD PRESSURE: 99 MMHG

## 2018-09-20 DIAGNOSIS — Z94.7 CORNEAL TRANSPLANT STATUS: Chronic | ICD-10-CM

## 2018-09-20 DIAGNOSIS — Z98.89 OTHER SPECIFIED POSTPROCEDURAL STATES: Chronic | ICD-10-CM

## 2018-09-20 DIAGNOSIS — M53.9 DORSOPATHY, UNSPECIFIED: Chronic | ICD-10-CM

## 2018-09-20 DIAGNOSIS — Z95.828 PRESENCE OF OTHER VASCULAR IMPLANTS AND GRAFTS: Chronic | ICD-10-CM

## 2018-09-20 LAB
ADD ON TEST-SPECIMEN IN LAB: SIGNIFICANT CHANGE UP
ALBUMIN SERPL ELPH-MCNC: 2.6 G/DL — LOW (ref 3.3–5)
ALP SERPL-CCNC: 208 U/L — HIGH (ref 40–120)
ALT FLD-CCNC: 18 U/L — SIGNIFICANT CHANGE UP (ref 12–78)
ANION GAP SERPL CALC-SCNC: 13 MMOL/L — SIGNIFICANT CHANGE UP (ref 5–17)
ANION GAP SERPL CALC-SCNC: 9 MMOL/L — SIGNIFICANT CHANGE UP (ref 5–17)
APPEARANCE UR: ABNORMAL
APTT BLD: 32.8 SEC — SIGNIFICANT CHANGE UP (ref 27.5–37.4)
AST SERPL-CCNC: 29 U/L — SIGNIFICANT CHANGE UP (ref 15–37)
BACTERIA # UR AUTO: ABNORMAL
BASOPHILS # BLD AUTO: 0 K/UL — SIGNIFICANT CHANGE UP (ref 0–0.2)
BASOPHILS NFR BLD AUTO: 0 % — SIGNIFICANT CHANGE UP (ref 0–2)
BILIRUB SERPL-MCNC: 0.5 MG/DL — SIGNIFICANT CHANGE UP (ref 0.2–1.2)
BILIRUB UR-MCNC: NEGATIVE — SIGNIFICANT CHANGE UP
BUN SERPL-MCNC: 31 MG/DL — HIGH (ref 7–23)
BUN SERPL-MCNC: 39 MG/DL — HIGH (ref 7–23)
CALCIUM SERPL-MCNC: 7.8 MG/DL — LOW (ref 8.5–10.1)
CALCIUM SERPL-MCNC: 8.1 MG/DL — LOW (ref 8.5–10.1)
CHLORIDE SERPL-SCNC: 107 MMOL/L — SIGNIFICANT CHANGE UP (ref 96–108)
CHLORIDE SERPL-SCNC: 97 MMOL/L — SIGNIFICANT CHANGE UP (ref 96–108)
CK SERPL-CCNC: 275 U/L — HIGH (ref 26–192)
CO2 SERPL-SCNC: 19 MMOL/L — LOW (ref 22–31)
CO2 SERPL-SCNC: 23 MMOL/L — SIGNIFICANT CHANGE UP (ref 22–31)
COLOR SPEC: YELLOW — SIGNIFICANT CHANGE UP
COMMENT - URINE: SIGNIFICANT CHANGE UP
CREAT SERPL-MCNC: 0.59 MG/DL — SIGNIFICANT CHANGE UP (ref 0.5–1.3)
CREAT SERPL-MCNC: 0.97 MG/DL — SIGNIFICANT CHANGE UP (ref 0.5–1.3)
DIFF PNL FLD: ABNORMAL
EOSINOPHIL # BLD AUTO: 0 K/UL — SIGNIFICANT CHANGE UP (ref 0–0.5)
EOSINOPHIL NFR BLD AUTO: 0 % — SIGNIFICANT CHANGE UP (ref 0–6)
EPI CELLS # UR: SIGNIFICANT CHANGE UP
GLUCOSE SERPL-MCNC: 104 MG/DL — HIGH (ref 70–99)
GLUCOSE SERPL-MCNC: 134 MG/DL — HIGH (ref 70–99)
GLUCOSE UR QL: NEGATIVE MG/DL — SIGNIFICANT CHANGE UP
HCT VFR BLD CALC: 29 % — LOW (ref 34.5–45)
HCT VFR BLD CALC: 31.1 % — LOW (ref 34.5–45)
HGB BLD-MCNC: 9.4 G/DL — LOW (ref 11.5–15.5)
HGB BLD-MCNC: 9.9 G/DL — LOW (ref 11.5–15.5)
INR BLD: 1.27 RATIO — HIGH (ref 0.88–1.16)
KETONES UR-MCNC: ABNORMAL
LACTATE SERPL-SCNC: 1.5 MMOL/L — SIGNIFICANT CHANGE UP (ref 0.7–2)
LACTATE SERPL-SCNC: 3.6 MMOL/L — HIGH (ref 0.7–2)
LEUKOCYTE ESTERASE UR-ACNC: ABNORMAL
LYMPHOCYTES # BLD AUTO: 0.13 K/UL — LOW (ref 1–3.3)
LYMPHOCYTES # BLD AUTO: 1 % — LOW (ref 13–44)
MAGNESIUM SERPL-MCNC: 1.6 MG/DL — SIGNIFICANT CHANGE UP (ref 1.6–2.6)
MAGNESIUM SERPL-MCNC: 1.8 MG/DL — SIGNIFICANT CHANGE UP (ref 1.6–2.6)
MANUAL SMEAR VERIFICATION: SIGNIFICANT CHANGE UP
MCHC RBC-ENTMCNC: 25.8 PG — LOW (ref 27–34)
MCHC RBC-ENTMCNC: 26.6 PG — LOW (ref 27–34)
MCHC RBC-ENTMCNC: 31.8 GM/DL — LOW (ref 32–36)
MCHC RBC-ENTMCNC: 32.4 GM/DL — SIGNIFICANT CHANGE UP (ref 32–36)
MCV RBC AUTO: 81.2 FL — SIGNIFICANT CHANGE UP (ref 80–100)
MCV RBC AUTO: 81.9 FL — SIGNIFICANT CHANGE UP (ref 80–100)
MONOCYTES # BLD AUTO: 0.64 K/UL — SIGNIFICANT CHANGE UP (ref 0–0.9)
MONOCYTES NFR BLD AUTO: 5 % — SIGNIFICANT CHANGE UP (ref 2–14)
NEUTROPHILS # BLD AUTO: 11.97 K/UL — HIGH (ref 1.8–7.4)
NEUTROPHILS NFR BLD AUTO: 94 % — HIGH (ref 43–77)
NITRITE UR-MCNC: POSITIVE
NRBC # BLD: 0 /100 WBCS — SIGNIFICANT CHANGE UP (ref 0–0)
NRBC # BLD: 0 /100 — SIGNIFICANT CHANGE UP (ref 0–0)
NRBC # BLD: SIGNIFICANT CHANGE UP /100 WBCS (ref 0–0)
NT-PROBNP SERPL-SCNC: HIGH PG/ML (ref 0–125)
PH UR: 5 — SIGNIFICANT CHANGE UP (ref 5–8)
PLAT MORPH BLD: NORMAL — SIGNIFICANT CHANGE UP
PLATELET # BLD AUTO: 121 K/UL — LOW (ref 150–400)
PLATELET # BLD AUTO: 130 K/UL — LOW (ref 150–400)
POTASSIUM SERPL-MCNC: 3 MMOL/L — LOW (ref 3.5–5.3)
POTASSIUM SERPL-MCNC: 3.3 MMOL/L — LOW (ref 3.5–5.3)
POTASSIUM SERPL-SCNC: 3 MMOL/L — LOW (ref 3.5–5.3)
POTASSIUM SERPL-SCNC: 3.3 MMOL/L — LOW (ref 3.5–5.3)
PROT SERPL-MCNC: 5.7 GM/DL — LOW (ref 6–8.3)
PROT UR-MCNC: 100 MG/DL
PROTHROM AB SERPL-ACNC: 13.8 SEC — HIGH (ref 9.8–12.7)
RAPID RVP RESULT: SIGNIFICANT CHANGE UP
RBC # BLD: 3.54 M/UL — LOW (ref 3.8–5.2)
RBC # BLD: 3.83 M/UL — SIGNIFICANT CHANGE UP (ref 3.8–5.2)
RBC # FLD: 14.4 % — SIGNIFICANT CHANGE UP (ref 10.3–14.5)
RBC # FLD: 14.5 % — SIGNIFICANT CHANGE UP (ref 10.3–14.5)
RBC BLD AUTO: SIGNIFICANT CHANGE UP
RBC CASTS # UR COMP ASSIST: ABNORMAL /HPF (ref 0–4)
SODIUM SERPL-SCNC: 129 MMOL/L — LOW (ref 135–145)
SODIUM SERPL-SCNC: 139 MMOL/L — SIGNIFICANT CHANGE UP (ref 135–145)
SP GR SPEC: 1.01 — SIGNIFICANT CHANGE UP (ref 1.01–1.02)
TROPONIN I SERPL-MCNC: 0.06 NG/ML — HIGH (ref 0.01–0.04)
TROPONIN I SERPL-MCNC: 0.09 NG/ML — HIGH (ref 0.01–0.04)
TSH SERPL-MCNC: 0.02 UU/ML — LOW (ref 0.34–4.82)
UROBILINOGEN FLD QL: 1 MG/DL
WBC # BLD: 12.73 K/UL — HIGH (ref 3.8–10.5)
WBC # BLD: 18.91 K/UL — HIGH (ref 3.8–10.5)
WBC # FLD AUTO: 12.73 K/UL — HIGH (ref 3.8–10.5)
WBC # FLD AUTO: 18.91 K/UL — HIGH (ref 3.8–10.5)
WBC UR QL: >50

## 2018-09-20 PROCEDURE — 70450 CT HEAD/BRAIN W/O DYE: CPT | Mod: 26

## 2018-09-20 PROCEDURE — 93010 ELECTROCARDIOGRAM REPORT: CPT

## 2018-09-20 PROCEDURE — 99285 EMERGENCY DEPT VISIT HI MDM: CPT

## 2018-09-20 PROCEDURE — 71045 X-RAY EXAM CHEST 1 VIEW: CPT | Mod: 26

## 2018-09-20 RX ORDER — VANCOMYCIN HCL 1 G
1000 VIAL (EA) INTRAVENOUS ONCE
Qty: 0 | Refills: 0 | Status: COMPLETED | OUTPATIENT
Start: 2018-09-20 | End: 2018-09-20

## 2018-09-20 RX ORDER — FENTANYL CITRATE 50 UG/ML
25 INJECTION INTRAVENOUS ONCE
Qty: 0 | Refills: 0 | Status: DISCONTINUED | OUTPATIENT
Start: 2018-09-20 | End: 2018-09-20

## 2018-09-20 RX ORDER — ACETAMINOPHEN 500 MG
650 TABLET ORAL EVERY 8 HOURS
Qty: 0 | Refills: 0 | Status: DISCONTINUED | OUTPATIENT
Start: 2018-09-20 | End: 2018-09-24

## 2018-09-20 RX ORDER — ASPIRIN/CALCIUM CARB/MAGNESIUM 324 MG
81 TABLET ORAL DAILY
Qty: 0 | Refills: 0 | Status: DISCONTINUED | OUTPATIENT
Start: 2018-09-21 | End: 2018-09-24

## 2018-09-20 RX ORDER — DOCUSATE SODIUM 100 MG
100 CAPSULE ORAL THREE TIMES A DAY
Qty: 0 | Refills: 0 | Status: DISCONTINUED | OUTPATIENT
Start: 2018-09-20 | End: 2018-09-24

## 2018-09-20 RX ORDER — GABAPENTIN 400 MG/1
300 CAPSULE ORAL THREE TIMES A DAY
Qty: 0 | Refills: 0 | Status: DISCONTINUED | OUTPATIENT
Start: 2018-09-20 | End: 2018-09-24

## 2018-09-20 RX ORDER — CEFEPIME 1 G/1
1000 INJECTION, POWDER, FOR SOLUTION INTRAMUSCULAR; INTRAVENOUS ONCE
Qty: 0 | Refills: 0 | Status: COMPLETED | OUTPATIENT
Start: 2018-09-20 | End: 2018-09-20

## 2018-09-20 RX ORDER — PREDNISOLONE SODIUM PHOSPHATE 1 %
1 DROPS OPHTHALMIC (EYE)
Qty: 0 | Refills: 0 | Status: DISCONTINUED | OUTPATIENT
Start: 2018-09-20 | End: 2018-09-24

## 2018-09-20 RX ORDER — ASPIRIN/CALCIUM CARB/MAGNESIUM 324 MG
81 TABLET ORAL DAILY
Qty: 0 | Refills: 0 | Status: DISCONTINUED | OUTPATIENT
Start: 2018-09-20 | End: 2018-09-20

## 2018-09-20 RX ORDER — ATORVASTATIN CALCIUM 80 MG/1
40 TABLET, FILM COATED ORAL AT BEDTIME
Qty: 0 | Refills: 0 | Status: DISCONTINUED | OUTPATIENT
Start: 2018-09-20 | End: 2018-09-24

## 2018-09-20 RX ORDER — POTASSIUM CHLORIDE 20 MEQ
40 PACKET (EA) ORAL EVERY 4 HOURS
Qty: 0 | Refills: 0 | Status: COMPLETED | OUTPATIENT
Start: 2018-09-20 | End: 2018-09-21

## 2018-09-20 RX ORDER — DEXTROSE MONOHYDRATE, SODIUM CHLORIDE, AND POTASSIUM CHLORIDE 50; .745; 4.5 G/1000ML; G/1000ML; G/1000ML
1000 INJECTION, SOLUTION INTRAVENOUS
Qty: 0 | Refills: 0 | Status: DISCONTINUED | OUTPATIENT
Start: 2018-09-20 | End: 2018-09-20

## 2018-09-20 RX ORDER — CEFEPIME 1 G/1
1000 INJECTION, POWDER, FOR SOLUTION INTRAMUSCULAR; INTRAVENOUS EVERY 12 HOURS
Qty: 0 | Refills: 0 | Status: DISCONTINUED | OUTPATIENT
Start: 2018-09-20 | End: 2018-09-20

## 2018-09-20 RX ORDER — SODIUM CHLORIDE 9 MG/ML
1500 INJECTION INTRAMUSCULAR; INTRAVENOUS; SUBCUTANEOUS ONCE
Qty: 0 | Refills: 0 | Status: COMPLETED | OUTPATIENT
Start: 2018-09-20 | End: 2018-09-20

## 2018-09-20 RX ORDER — LANOLIN ALCOHOL/MO/W.PET/CERES
3 CREAM (GRAM) TOPICAL ONCE
Qty: 0 | Refills: 0 | Status: COMPLETED | OUTPATIENT
Start: 2018-09-20 | End: 2018-09-20

## 2018-09-20 RX ORDER — BACLOFEN 100 %
20 POWDER (GRAM) MISCELLANEOUS
Qty: 0 | Refills: 0 | Status: DISCONTINUED | OUTPATIENT
Start: 2018-09-20 | End: 2018-09-24

## 2018-09-20 RX ORDER — ACETAMINOPHEN 500 MG
650 TABLET ORAL EVERY 8 HOURS
Qty: 0 | Refills: 0 | Status: DISCONTINUED | OUTPATIENT
Start: 2018-09-20 | End: 2018-09-20

## 2018-09-20 RX ORDER — FLUCONAZOLE 150 MG/1
0 TABLET ORAL
Qty: 0 | Refills: 0 | COMMUNITY

## 2018-09-20 RX ORDER — ASPIRIN/CALCIUM CARB/MAGNESIUM 324 MG
150 TABLET ORAL DAILY
Qty: 0 | Refills: 0 | Status: DISCONTINUED | OUTPATIENT
Start: 2018-09-20 | End: 2018-09-20

## 2018-09-20 RX ORDER — ASPIRIN/CALCIUM CARB/MAGNESIUM 324 MG
325 TABLET ORAL ONCE
Qty: 0 | Refills: 0 | Status: COMPLETED | OUTPATIENT
Start: 2018-09-20 | End: 2018-09-20

## 2018-09-20 RX ORDER — SENNA PLUS 8.6 MG/1
2 TABLET ORAL AT BEDTIME
Qty: 0 | Refills: 0 | Status: DISCONTINUED | OUTPATIENT
Start: 2018-09-20 | End: 2018-09-24

## 2018-09-20 RX ORDER — ACETAMINOPHEN 500 MG
1000 TABLET ORAL ONCE
Qty: 0 | Refills: 0 | Status: COMPLETED | OUTPATIENT
Start: 2018-09-20 | End: 2018-09-20

## 2018-09-20 RX ORDER — CEFEPIME 1 G/1
1000 INJECTION, POWDER, FOR SOLUTION INTRAMUSCULAR; INTRAVENOUS EVERY 12 HOURS
Qty: 0 | Refills: 0 | Status: DISCONTINUED | OUTPATIENT
Start: 2018-09-20 | End: 2018-09-21

## 2018-09-20 RX ORDER — DEXTROSE MONOHYDRATE, SODIUM CHLORIDE, AND POTASSIUM CHLORIDE 50; .745; 4.5 G/1000ML; G/1000ML; G/1000ML
1000 INJECTION, SOLUTION INTRAVENOUS
Qty: 0 | Refills: 0 | Status: DISCONTINUED | OUTPATIENT
Start: 2018-09-20 | End: 2018-09-21

## 2018-09-20 RX ORDER — DULOXETINE HYDROCHLORIDE 30 MG/1
20 CAPSULE, DELAYED RELEASE ORAL
Qty: 0 | Refills: 0 | Status: DISCONTINUED | OUTPATIENT
Start: 2018-09-20 | End: 2018-09-24

## 2018-09-20 RX ORDER — ONDANSETRON 8 MG/1
4 TABLET, FILM COATED ORAL EVERY 6 HOURS
Qty: 0 | Refills: 0 | Status: DISCONTINUED | OUTPATIENT
Start: 2018-09-20 | End: 2018-09-24

## 2018-09-20 RX ORDER — INFLUENZA VIRUS VACCINE 15; 15; 15; 15 UG/.5ML; UG/.5ML; UG/.5ML; UG/.5ML
0.5 SUSPENSION INTRAMUSCULAR ONCE
Qty: 0 | Refills: 0 | Status: DISCONTINUED | OUTPATIENT
Start: 2018-09-20 | End: 2018-09-24

## 2018-09-20 RX ORDER — OXYCODONE HYDROCHLORIDE 5 MG/1
5 TABLET ORAL EVERY 4 HOURS
Qty: 0 | Refills: 0 | Status: DISCONTINUED | OUTPATIENT
Start: 2018-09-20 | End: 2018-09-24

## 2018-09-20 RX ORDER — POTASSIUM CHLORIDE 20 MEQ
40 PACKET (EA) ORAL ONCE
Qty: 0 | Refills: 0 | Status: COMPLETED | OUTPATIENT
Start: 2018-09-20 | End: 2018-09-20

## 2018-09-20 RX ORDER — DULOXETINE HYDROCHLORIDE 30 MG/1
1 CAPSULE, DELAYED RELEASE ORAL
Qty: 0 | Refills: 0 | COMMUNITY

## 2018-09-20 RX ADMIN — SODIUM CHLORIDE 1500 MILLILITER(S): 9 INJECTION INTRAMUSCULAR; INTRAVENOUS; SUBCUTANEOUS at 11:39

## 2018-09-20 RX ADMIN — GABAPENTIN 300 MILLIGRAM(S): 400 CAPSULE ORAL at 19:25

## 2018-09-20 RX ADMIN — Medication 20 MILLIGRAM(S): at 19:24

## 2018-09-20 RX ADMIN — Medication 100 MILLIGRAM(S): at 21:14

## 2018-09-20 RX ADMIN — Medication 250 MILLIGRAM(S): at 10:50

## 2018-09-20 RX ADMIN — CEFEPIME 1000 MILLIGRAM(S): 1 INJECTION, POWDER, FOR SOLUTION INTRAMUSCULAR; INTRAVENOUS at 18:41

## 2018-09-20 RX ADMIN — OXYCODONE HYDROCHLORIDE 5 MILLIGRAM(S): 5 TABLET ORAL at 21:14

## 2018-09-20 RX ADMIN — Medication 1 DROP(S): at 18:41

## 2018-09-20 RX ADMIN — ATORVASTATIN CALCIUM 40 MILLIGRAM(S): 80 TABLET, FILM COATED ORAL at 21:14

## 2018-09-20 RX ADMIN — DULOXETINE HYDROCHLORIDE 20 MILLIGRAM(S): 30 CAPSULE, DELAYED RELEASE ORAL at 19:24

## 2018-09-20 RX ADMIN — Medication 40 MILLIEQUIVALENT(S): at 21:13

## 2018-09-20 RX ADMIN — Medication 325 MILLIGRAM(S): at 19:24

## 2018-09-20 RX ADMIN — CEFEPIME 100 MILLIGRAM(S): 1 INJECTION, POWDER, FOR SOLUTION INTRAMUSCULAR; INTRAVENOUS at 10:42

## 2018-09-20 RX ADMIN — Medication 650 MILLIGRAM(S): at 19:26

## 2018-09-20 RX ADMIN — SODIUM CHLORIDE 3000 MILLILITER(S): 9 INJECTION INTRAMUSCULAR; INTRAVENOUS; SUBCUTANEOUS at 10:30

## 2018-09-20 RX ADMIN — DEXTROSE MONOHYDRATE, SODIUM CHLORIDE, AND POTASSIUM CHLORIDE 100 MILLILITER(S): 50; .745; 4.5 INJECTION, SOLUTION INTRAVENOUS at 19:52

## 2018-09-20 RX ADMIN — OXYCODONE HYDROCHLORIDE 5 MILLIGRAM(S): 5 TABLET ORAL at 22:00

## 2018-09-20 RX ADMIN — Medication 1000 MILLIGRAM(S): at 11:55

## 2018-09-20 RX ADMIN — CEFEPIME 1000 MILLIGRAM(S): 1 INJECTION, POWDER, FOR SOLUTION INTRAMUSCULAR; INTRAVENOUS at 10:44

## 2018-09-20 RX ADMIN — Medication 1000 MILLIGRAM(S): at 10:20

## 2018-09-20 NOTE — ED PROVIDER NOTE - MUSCULOSKELETAL, MLM
Spine appears normal, range of motion is not limited, no muscle or joint tenderness +contracted in BL LE at baseline

## 2018-09-20 NOTE — ED PROVIDER NOTE - PROGRESS NOTE DETAILS
Spoke to Dr. Enciso, ICU will come see pt as requested by hospitalist. Mary DO: S/o to Dr. Schrader pending ICU consult and admission.

## 2018-09-20 NOTE — CONSULT NOTE ADULT - ASSESSMENT
A/P 67 who is presenting with Severe sepsis from a UTI and altered awarenss    Suggest:   Continue the cefepime and vanco given in the ED pending UC and BC results  Continue IVF  Patient lactate is now normal  Patients BP is normal  Patients altered awareness is from sepsis  patient does not require the ICU at this time    D/W the ED attending  D/W the patients   Call placed to the Hospitalist Dr. Rm awaiting a call back

## 2018-09-20 NOTE — SEPSIS NOTE - NSSUBJFT_GEN_A_CORE
pt less lethargic, answering questions appropriately   T(C): 37.4 (09-20-18 @ 14:30), Max: 40 (09-20-18 @ 10:01)  T(F): 99.3 (09-20-18 @ 14:30), Max: 104 (09-20-18 @ 10:01)  HR: 93 (09-20-18 @ 18:00) (93 - 130)  BP: 123/62 (09-20-18 @ 18:00) (96/62 - 128/64)  RR: 23 (09-20-18 @ 18:00) (19 - 28)  SpO2: 100% (09-20-18 @ 18:00) (95% - 100%)  Wt(kg): --

## 2018-09-20 NOTE — ED ADULT NURSE NOTE - NSIMPLEMENTINTERV_GEN_ALL_ED
Implemented All Universal Safety Interventions:  [place] to call system. Call bell, personal items and telephone within reach. Instruct patient to call for assistance. Room bathroom lighting operational. Non-slip footwear when patient is off stretcher. Physically safe environment: no spills, clutter or unnecessary equipment. Stretcher in lowest position, wheels locked, appropriate side rails in place.

## 2018-09-20 NOTE — H&P ADULT - HISTORY OF PRESENT ILLNESS
66 yo F with a PMH of HTN, HLD, h/o TIA,  Type A aortic thoracic dissection 5/2009 s/p repair, s/p descending aortic aneurysm repair 09/2016, spontaneous subdural spinal cord hemorrhage s/p drainage 08/2014 with 2 untethering of the spinal cord procedures, paraplegia now wheelchair bound , Left eye blindness s/p cornea transplant, h/o of multiple UTIs in the setting of self catheterization, empyema, Chronic back pain,  baclofen pump who presented to the ED with worsening of po intake for last 3 days, lethargy on and off, and now fevers. Patient lethargic, poor historian, but denies chest pain, reports back pain, denies abdominal pain, denies cough, headaches. As per  at bedside Tyler , patient has low po intake for last couples of days.    In Ed - T(F): 99.3 , Max: 104  HR: 96  (96 - 130) BP: 112/65 (96/62 - 114/86) RR: 23  (19 - 28) SpO2: 98%  (95% - 100%) WBC 12.7, Hb 9.9, Pl 130, INR 1.27 , lactate 2.6--> 1. 2, BUN 39, Cr 0.97, troponin 0.089,  S/P 2.5 L NS , s/p Vanco, cefepime, tylenol, pt  now more normotensive,  but still altered.  As per the patient  who is her primary caregiver this altered awareness is common when she has a UTI.

## 2018-09-20 NOTE — H&P ADULT - ASSESSMENT
66 yo F with a PMH of HTN, HLD, h/o TIA,  Type A aortic thoracic dissection 5/2009 s/p repair, s/p descending aortic aneurysm repair 09/2016, spontaneous subdural spinal cord hemorrhage s/p drainage 08/2014 with 2 untethering of the spinal cord procedures, paraplegia now wheelchair bound , Left eye blindness s/p cornea transplant, h/o of multiple UTIs in the setting of self catheterization, empyema, Chronic back pain,  baclofen pump who presented to the ED with worsening of po intake for last 3 days, lethargy on and off, and now fevers. Patient lethargic, poor historian, but denies chest pain, reports back pain, denies abdominal pain, denies cough, headaches. As per  at bedside Tyler , patient has low po intake for last couples of days.    In Ed - T(F): 99.3 , Max: 104  HR: 96  (96 - 130) BP: 112/65 (96/62 - 114/86) RR: 23  (19 - 28) SpO2: 98%  (95% - 100%) WBC 12.7, Hb 9.9, Pl 130, INR 1.27 , lactate 2.6--> 1. 2, BUN 39, Cr 0.97, troponin 0.089,  S/P 2.5 L NS , s/p Vanco, cefepime, tylenol, pt  now more normotensive,  but still altered.  As per the patient  who is her primary caregiver this altered awareness is common when she has a UTI.       * Severe sepsis due to UTI suspected resistant bacteria due to chronic urinary retention and use of self cath  - CICU  - IV fluids  - IV vanco, Cefepime in ED, c/w Cefepime  - I/O monitoring  - hold antihypertensive medications at this time  - renal sono - to evaluate the renal system, r/o obstruction   - ID consult  - f/u cultures    *Elevated troponin likely demand  ischemic , r/o ACS  - serial troponin, CK, check BNP  - asa 81 given anemia  - check lipid profile, TSH  - 2 d echo  - f/u bood cultures  - cardiology consult    * Metabolic encephalopathy due to UTI  - CT head - to r/o acute pathology  - check TSH, B12  - monitor mental status    * Hyponatremia hypovolemic  - s/p IV boluses in ED  - c/w IV fluids  - check BMP now and in am    * Hypokalemia, replace  - s/p 40 K   - check Mg     * Anemia  - check FOBT, iron studies  - monitor   - monitor    *Mild thrombocytopenia  - no heparin   - monitor    *Chronic back pain   - c/w oxycodone prn hole for low BP  - monitor    * HTN  - hold labetalol  - restart as soon BP better    * Advanced directive   - spend 30 min  - HCP- Tyler  [phone]  - Full code    DVT proph - IPC, has IVC filter , hold heparin until source of anemia established   IMPROVE VTE Individual Risk Assessment    RISK                                                                Points    [  ] Previous VTE                                                  3    [  ] Thrombophilia                                               2    [ x ] Lower limb paralysis                                      2        (unable to hold up >15 seconds)      [  ] Current Cancer                                              2         (within 6 months)    [  ] Immobilization > 24 hrs                                1    [  ] ICU/CCU stay > 24 hours                              1    [ x ] Age > 60                                                      1    IMPROVE VTE Score __3_______

## 2018-09-20 NOTE — ED PROVIDER NOTE - PMH
Anemia    Aortic dissection, abdominal  Type B Watched regularly  Aortic dissection, thoracic  Type A Repaired  Blindness of left eye    Chronic constipation    Dorsalgia of lumbar region    Hematoma  spinal  HTN (Hypertension)    Osteoporosis    PAD (peripheral artery disease)    Paraplegia    Self-catheterizes urinary bladder    TIA (transient ischemic attack)    UTI (urinary tract infection)    Uveitis

## 2018-09-20 NOTE — ED PROVIDER NOTE - OBJECTIVE STATEMENT
68 y/o f with PMHx of anemia, AAA, HTN, PAD, Paraplegia, TIA presenting to the ED BIBEMS c/o weakness, dark urine. Pt found to be hypotensive by EMS PTA. 68 y/o f with PMHx of recurrent UTI, anemia, Ascending aortic aneurysm (2009), AAA (2015), HTN, PAD, spinal bleed, Paraplegia, TIA presenting to the ED BIBEMS c/o AMS, fever (Tmax 104 rectally), weakness, dark urine x2 days. Per , pt was hallucinating last night, being treated at home for recurrent UTI with Macrobid. Pt found to be hypotensive by EMS PTA.  called PMD, Dr. Basil Branham who recommended pt come to ED for further eval.

## 2018-09-20 NOTE — H&P ADULT - NSHPREVIEWOFSYSTEMS_GEN_ALL_CORE
REVIEW OF SYSTEMS:    CONSTITUTIONAL: No weakness,  + fevers and  chills  EYES/ENT: No visual changes;  No vertigo or throat pain   NECK: No pain or stiffness  RESPIRATORY: No cough, wheezing, hemoptysis; No shortness of breath  CARDIOVASCULAR: No chest pain or palpitations  GASTROINTESTINAL: No abdominal or epigastric pain. No nausea, vomiting, or hematemesis; No diarrhea or constipation. No melena or hematochezia.  GENITOURINARY: No dysuria, frequency or hematuria  NEUROLOGICAL: + weakness paraplegic  SKIN: No itching, burning, rashes, or lesions   All other review of systems is negative unless indicated above.

## 2018-09-20 NOTE — PATIENT PROFILE ADULT. - VISION (WITH CORRECTIVE LENSES IF THE PATIENT USUALLY WEARS THEM):
Partially impaired: cannot see medication labels or newsprint, but can see obstacles in path, and the surrounding layout; can count fingers at arm's length/wears glasses for far sighted

## 2018-09-20 NOTE — ED ADULT NURSE NOTE - OBJECTIVE STATEMENT
Patient brought in by EMS from home with chief complaint of weakness. As per , "patient had an episode of hallucination last night. Patient is on an antibiotic for a UTI."

## 2018-09-20 NOTE — H&P ADULT - FAMILY HISTORY
Father  Still living? No  Family history of Alzheimer's disease, Age at diagnosis: Age Unknown     Mother  Still living? Unknown  Family history of Alzheimer's disease, Age at diagnosis: Age Unknown

## 2018-09-20 NOTE — H&P ADULT - NSHPPHYSICALEXAM_GEN_ALL_CORE
PHYSICAL EXAM:    Constitutional: lethargic , malfunctioned   HEENT: PERR, EOMI, Normal Hearing, MMM  Neck: Soft and supple, No LAD, No JVD  Respiratory: Breath sounds decreased at bases,  No wheezing, rales or rhonchi  Cardiovascular: S1 and S2, regular rate and rhythm, no Murmurs, gallops or rubs  Gastrointestinal: Bowel Sounds present, soft, nontender, nondistended, no guarding, no rebound  Extremities: No peripheral edema  Vascular: 2+ peripheral pulses  Neurological: A/O x 1, 4/5 UE strength, LE paraplegia  Musculoskeletal: paraplegia of LE   Skin: No rashes  rectal : deferred, not indicated

## 2018-09-20 NOTE — ED PROVIDER NOTE - CARDIAC, MLM
Normal rate, regular rhythm.  Heart sounds S1, S2.  No murmurs, rubs or gallops. +tachycardic, regular rhythm.  Heart sounds S1, S2.  No murmurs, rubs or gallops.

## 2018-09-21 LAB
-  K. PNEUMONIAE GROUP: SIGNIFICANT CHANGE UP
ALBUMIN SERPL ELPH-MCNC: 2.5 G/DL — LOW (ref 3.3–5)
ALP SERPL-CCNC: 144 U/L — HIGH (ref 40–120)
ALT FLD-CCNC: 20 U/L — SIGNIFICANT CHANGE UP (ref 12–78)
ANION GAP SERPL CALC-SCNC: 6 MMOL/L — SIGNIFICANT CHANGE UP (ref 5–17)
AST SERPL-CCNC: 29 U/L — SIGNIFICANT CHANGE UP (ref 15–37)
BASOPHILS # BLD AUTO: 0.1 K/UL — SIGNIFICANT CHANGE UP (ref 0–0.2)
BASOPHILS NFR BLD AUTO: 0.6 % — SIGNIFICANT CHANGE UP (ref 0–2)
BILIRUB SERPL-MCNC: 0.4 MG/DL — SIGNIFICANT CHANGE UP (ref 0.2–1.2)
BUN SERPL-MCNC: 21 MG/DL — SIGNIFICANT CHANGE UP (ref 7–23)
CALCIUM SERPL-MCNC: 8.2 MG/DL — LOW (ref 8.5–10.1)
CHLORIDE SERPL-SCNC: 111 MMOL/L — HIGH (ref 96–108)
CHOLEST SERPL-MCNC: 119 MG/DL — SIGNIFICANT CHANGE UP (ref 10–199)
CK SERPL-CCNC: 264 U/L — HIGH (ref 26–192)
CO2 SERPL-SCNC: 23 MMOL/L — SIGNIFICANT CHANGE UP (ref 22–31)
CREAT SERPL-MCNC: 0.38 MG/DL — LOW (ref 0.5–1.3)
CULTURE RESULTS: SIGNIFICANT CHANGE UP
E COLI DNA BLD POS QL NAA+NON-PROBE: SIGNIFICANT CHANGE UP
EOSINOPHIL # BLD AUTO: 0.05 K/UL — SIGNIFICANT CHANGE UP (ref 0–0.5)
EOSINOPHIL NFR BLD AUTO: 0.3 % — SIGNIFICANT CHANGE UP (ref 0–6)
FERRITIN SERPL-MCNC: 240 NG/ML — HIGH (ref 15–150)
FOLATE SERPL-MCNC: 8.6 NG/ML — SIGNIFICANT CHANGE UP
GLUCOSE SERPL-MCNC: 138 MG/DL — HIGH (ref 70–99)
GRAM STN FLD: SIGNIFICANT CHANGE UP
HCT VFR BLD CALC: 28.8 % — LOW (ref 34.5–45)
HDLC SERPL-MCNC: 23 MG/DL — LOW
HGB BLD-MCNC: 9 G/DL — LOW (ref 11.5–15.5)
IMM GRANULOCYTES NFR BLD AUTO: 0.8 % — SIGNIFICANT CHANGE UP (ref 0–1.5)
IRON SATN MFR SERPL: 3 % — LOW (ref 14–50)
IRON SATN MFR SERPL: 7 UG/DL — LOW (ref 30–160)
LIPID PNL WITH DIRECT LDL SERPL: 61 MG/DL — SIGNIFICANT CHANGE UP
LYMPHOCYTES # BLD AUTO: 0.47 K/UL — LOW (ref 1–3.3)
LYMPHOCYTES # BLD AUTO: 3 % — LOW (ref 13–44)
MAGNESIUM SERPL-MCNC: 2 MG/DL — SIGNIFICANT CHANGE UP (ref 1.6–2.6)
MCHC RBC-ENTMCNC: 25.8 PG — LOW (ref 27–34)
MCHC RBC-ENTMCNC: 31.3 GM/DL — LOW (ref 32–36)
MCV RBC AUTO: 82.5 FL — SIGNIFICANT CHANGE UP (ref 80–100)
METHOD TYPE: SIGNIFICANT CHANGE UP
MONOCYTES # BLD AUTO: 0.91 K/UL — HIGH (ref 0–0.9)
MONOCYTES NFR BLD AUTO: 5.8 % — SIGNIFICANT CHANGE UP (ref 2–14)
NEUTROPHILS # BLD AUTO: 13.97 K/UL — HIGH (ref 1.8–7.4)
NEUTROPHILS NFR BLD AUTO: 89.5 % — HIGH (ref 43–77)
NRBC # BLD: 0 /100 WBCS — SIGNIFICANT CHANGE UP (ref 0–0)
PHOSPHATE SERPL-MCNC: 1.6 MG/DL — LOW (ref 2.5–4.5)
PLATELET # BLD AUTO: 119 K/UL — LOW (ref 150–400)
POTASSIUM SERPL-MCNC: 3.4 MMOL/L — LOW (ref 3.5–5.3)
POTASSIUM SERPL-SCNC: 3.4 MMOL/L — LOW (ref 3.5–5.3)
PROT SERPL-MCNC: 5.6 GM/DL — LOW (ref 6–8.3)
RBC # BLD: 3.49 M/UL — LOW (ref 3.8–5.2)
RBC # FLD: 14.5 % — SIGNIFICANT CHANGE UP (ref 10.3–14.5)
SODIUM SERPL-SCNC: 140 MMOL/L — SIGNIFICANT CHANGE UP (ref 135–145)
SPECIMEN SOURCE: SIGNIFICANT CHANGE UP
T4 FREE SERPL-MCNC: 1.27 NG/DL — SIGNIFICANT CHANGE UP (ref 0.76–1.46)
TIBC SERPL-MCNC: 255 UG/DL — SIGNIFICANT CHANGE UP (ref 220–430)
TOTAL CHOLESTEROL/HDL RATIO MEASUREMENT: 5.2 RATIO — SIGNIFICANT CHANGE UP (ref 3.3–7.1)
TRIGL SERPL-MCNC: 177 MG/DL — HIGH (ref 10–149)
TROPONIN I SERPL-MCNC: 0.05 NG/ML — HIGH (ref 0.01–0.04)
TSH SERPL-MCNC: 0.01 UU/ML — LOW (ref 0.34–4.82)
TSH SERPL-MCNC: 0.01 UU/ML — LOW (ref 0.34–4.82)
UIBC SERPL-MCNC: 248 UG/DL — SIGNIFICANT CHANGE UP (ref 110–370)
VIT B12 SERPL-MCNC: 747 PG/ML — SIGNIFICANT CHANGE UP (ref 232–1245)
WBC # BLD: 15.63 K/UL — HIGH (ref 3.8–10.5)
WBC # FLD AUTO: 15.63 K/UL — HIGH (ref 3.8–10.5)

## 2018-09-21 PROCEDURE — 93306 TTE W/DOPPLER COMPLETE: CPT | Mod: 26

## 2018-09-21 PROCEDURE — 93010 ELECTROCARDIOGRAM REPORT: CPT

## 2018-09-21 PROCEDURE — 76770 US EXAM ABDO BACK WALL COMP: CPT | Mod: 26

## 2018-09-21 RX ORDER — CEFEPIME 1 G/1
2000 INJECTION, POWDER, FOR SOLUTION INTRAMUSCULAR; INTRAVENOUS EVERY 12 HOURS
Qty: 0 | Refills: 0 | Status: DISCONTINUED | OUTPATIENT
Start: 2018-09-21 | End: 2018-09-24

## 2018-09-21 RX ORDER — MAGNESIUM HYDROXIDE 400 MG/1
30 TABLET, CHEWABLE ORAL
Qty: 0 | Refills: 0 | Status: DISCONTINUED | OUTPATIENT
Start: 2018-09-21 | End: 2018-09-24

## 2018-09-21 RX ORDER — ZOLPIDEM TARTRATE 10 MG/1
5 TABLET ORAL ONCE
Qty: 0 | Refills: 0 | Status: DISCONTINUED | OUTPATIENT
Start: 2018-09-21 | End: 2018-09-22

## 2018-09-21 RX ORDER — POTASSIUM CHLORIDE 20 MEQ
20 PACKET (EA) ORAL ONCE
Qty: 0 | Refills: 0 | Status: COMPLETED | OUTPATIENT
Start: 2018-09-21 | End: 2018-09-21

## 2018-09-21 RX ORDER — MINERAL OIL
133 OIL (ML) MISCELLANEOUS ONCE
Qty: 0 | Refills: 0 | Status: DISCONTINUED | OUTPATIENT
Start: 2018-09-21 | End: 2018-09-21

## 2018-09-21 RX ORDER — MULTIVIT WITH MIN/MFOLATE/K2 340-15/3 G
296 POWDER (GRAM) ORAL ONCE
Qty: 0 | Refills: 0 | Status: DISCONTINUED | OUTPATIENT
Start: 2018-09-21 | End: 2018-09-21

## 2018-09-21 RX ORDER — LABETALOL HCL 100 MG
100 TABLET ORAL EVERY 8 HOURS
Qty: 0 | Refills: 0 | Status: DISCONTINUED | OUTPATIENT
Start: 2018-09-21 | End: 2018-09-23

## 2018-09-21 RX ORDER — CALCIUM ACETATE 667 MG
667 TABLET ORAL
Qty: 0 | Refills: 0 | Status: DISCONTINUED | OUTPATIENT
Start: 2018-09-21 | End: 2018-09-24

## 2018-09-21 RX ORDER — CEFEPIME 1 G/1
2000 INJECTION, POWDER, FOR SOLUTION INTRAMUSCULAR; INTRAVENOUS EVERY 12 HOURS
Qty: 0 | Refills: 0 | Status: DISCONTINUED | OUTPATIENT
Start: 2018-09-21 | End: 2018-09-21

## 2018-09-21 RX ADMIN — Medication 20 MILLIEQUIVALENT(S): at 18:57

## 2018-09-21 RX ADMIN — ATORVASTATIN CALCIUM 40 MILLIGRAM(S): 80 TABLET, FILM COATED ORAL at 22:27

## 2018-09-21 RX ADMIN — Medication 100 MILLIGRAM(S): at 22:27

## 2018-09-21 RX ADMIN — DULOXETINE HYDROCHLORIDE 20 MILLIGRAM(S): 30 CAPSULE, DELAYED RELEASE ORAL at 06:01

## 2018-09-21 RX ADMIN — CEFEPIME 1000 MILLIGRAM(S): 1 INJECTION, POWDER, FOR SOLUTION INTRAMUSCULAR; INTRAVENOUS at 05:59

## 2018-09-21 RX ADMIN — GABAPENTIN 300 MILLIGRAM(S): 400 CAPSULE ORAL at 06:01

## 2018-09-21 RX ADMIN — Medication 650 MILLIGRAM(S): at 06:00

## 2018-09-21 RX ADMIN — Medication 20 MILLIGRAM(S): at 06:00

## 2018-09-21 RX ADMIN — Medication 650 MILLIGRAM(S): at 15:58

## 2018-09-21 RX ADMIN — Medication 40 MILLIEQUIVALENT(S): at 06:41

## 2018-09-21 RX ADMIN — DULOXETINE HYDROCHLORIDE 20 MILLIGRAM(S): 30 CAPSULE, DELAYED RELEASE ORAL at 17:14

## 2018-09-21 RX ADMIN — CEFEPIME 100 MILLIGRAM(S): 1 INJECTION, POWDER, FOR SOLUTION INTRAMUSCULAR; INTRAVENOUS at 18:59

## 2018-09-21 RX ADMIN — Medication 1 DROP(S): at 00:17

## 2018-09-21 RX ADMIN — Medication 20 MILLIGRAM(S): at 18:57

## 2018-09-21 RX ADMIN — Medication 1 DROP(S): at 12:31

## 2018-09-21 RX ADMIN — Medication 100 MILLIGRAM(S): at 06:00

## 2018-09-21 RX ADMIN — Medication 3 MILLIGRAM(S): at 00:17

## 2018-09-21 RX ADMIN — Medication 650 MILLIGRAM(S): at 15:00

## 2018-09-21 RX ADMIN — OXYCODONE HYDROCHLORIDE 5 MILLIGRAM(S): 5 TABLET ORAL at 08:56

## 2018-09-21 RX ADMIN — OXYCODONE HYDROCHLORIDE 5 MILLIGRAM(S): 5 TABLET ORAL at 08:13

## 2018-09-21 RX ADMIN — OXYCODONE HYDROCHLORIDE 5 MILLIGRAM(S): 5 TABLET ORAL at 22:26

## 2018-09-21 RX ADMIN — Medication 81 MILLIGRAM(S): at 12:33

## 2018-09-21 RX ADMIN — Medication 1 DROP(S): at 06:00

## 2018-09-21 RX ADMIN — OXYCODONE HYDROCHLORIDE 5 MILLIGRAM(S): 5 TABLET ORAL at 23:24

## 2018-09-21 RX ADMIN — OXYCODONE HYDROCHLORIDE 5 MILLIGRAM(S): 5 TABLET ORAL at 13:30

## 2018-09-21 RX ADMIN — OXYCODONE HYDROCHLORIDE 5 MILLIGRAM(S): 5 TABLET ORAL at 02:30

## 2018-09-21 RX ADMIN — OXYCODONE HYDROCHLORIDE 5 MILLIGRAM(S): 5 TABLET ORAL at 12:30

## 2018-09-21 RX ADMIN — GABAPENTIN 300 MILLIGRAM(S): 400 CAPSULE ORAL at 17:10

## 2018-09-21 RX ADMIN — Medication 1 DROP(S): at 17:14

## 2018-09-21 RX ADMIN — GABAPENTIN 300 MILLIGRAM(S): 400 CAPSULE ORAL at 22:27

## 2018-09-21 NOTE — SWALLOW BEDSIDE ASSESSMENT ADULT - SWALLOW EVAL: RECOMMENDED DIET
Suggest a regular texture diet with thin liquid consistencies as she appeared clinically tolerant of these food textures from an oropharyngeal swallowing stance on clinical exam.

## 2018-09-21 NOTE — SWALLOW BEDSIDE ASSESSMENT ADULT - COMMENTS
The patient was admitted to  with lethargy, fever and reduced PO intake.  Hospital course is notable for sepsis which is improving. This profile is superimposed upon a history of HTN, HLD, anemia, PAD, OP, constipation, frequent UTI's, blindness in left eye, previous uveitis, past corneal transplant, dorsalgia of lumbar spine s/p placement of a Baclofen pump, prior spinal cord hemorrhage s/p drainage, paraplegia, Type A aortic dissection s/p repair descending aortic aneurysm s/p repair, and previous IVC filter placement. See below for additional prior medical information. The patient was admitted to  with lethargy, fever and reduced PO intake.  Hospital course is notable for sepsis which is improving. This profile is superimposed upon a history of HTN, HLD, anemia, PAD, OP, constipation, frequent UTI's, blindness in left eye, previous uveitis, past corneal transplant, dorsalgia of lumbar spine s/p placement of a Baclofen pump, prior spinal cord hemorrhage s/p drainage, paraplegia, Type A aortic dissection s/p repair,  descending aortic aneurysm s/p repair, and previous IVC filter placement. See below for additional prior medical information.

## 2018-09-21 NOTE — CONSULT NOTE ADULT - ASSESSMENT
68 yo F with a PMH of HTN, HLD, h/o TIA,  Type A aortic thoracic dissection 5/2009 s/p repair, s/p descending aortic aneurysm repair 09/2016, spontaneous subdural spinal cord hemorrhage s/p drainage 08/2014 with 2 untethering of the spinal cord procedures, paraplegia now wheelchair bound , Left eye blindness s/p cornea transplant, h/o of multiple UTIs in the setting of self catheterization, empyema, Chronic back pain,  baclofen pump who presented to the ED 9/20 with worsening of po intake for last 3 days, lethargy on and off, and now fevers. Here pt noted with fever to 104, elevated wbc ct,positive ua and blood cx growing Ecoli/KLPN, pt has grown these organisms on prior cx, she was given IV cefepime.     1. Ecoli/KLPN sepsis/cystitis/urinary retention/paraplegia  - prior cx reviewed  - agree with cefepime increase to [id number] for gnr resistant coverage   - f/u sensitivities of cx  - repeat blood cx  - renal US reviewed, no stones or hydro  - monitor for retention, pt self-catheterizes  - f/u cbc  - tolerating abx well so far; no side effects noted  -reason for abx use and side effects reviewed with patient  - supportive care  - f/u cbc    2. other issues - care per medicine

## 2018-09-21 NOTE — PROVIDER CONTACT NOTE (CRITICAL VALUE NOTIFICATION) - SITUATION
[facility] called notifying that the preliminary results of the blood cultures were positive growth in aerobic bottle gram negative rods [facility] called notifying that the preliminary results of the blood cultures were positive growth in aerobic and aerobic bottle gram negative rods

## 2018-09-21 NOTE — SWALLOW BEDSIDE ASSESSMENT ADULT - SWALLOW EVAL: DIAGNOSIS
1)  The patient demonstrates Oropharyngeal Swallowing abilities which subjectively appear to be stable and within functional parameters for age. NO behavioral aspiration signs exhibited on exam. Odynophagia was denied.   2)  The patient was awake and interactive. She was oriented x3 and able to verbalize during communicative probes/in conversation without evidence of an overt primary motor speech or primary linguistic pathology. Pt is able to effectively verbalize her needs and is reportedly at communicative baseline.

## 2018-09-21 NOTE — SWALLOW BEDSIDE ASSESSMENT ADULT - SLP GENERAL OBSERVATIONS
The patient was awake and interactive. She was oriented x3 and able to verbalize during communicative probes/in conversation without evidence of an overt primary motor speech or primary linguistic pathology. Pt is able to effectively verbalize her needs and is reportedly at communicative baseline.   Pt denied Dysphagia when asked.

## 2018-09-21 NOTE — CONSULT NOTE ADULT - ASSESSMENT
66 y/o female severe sepsis/UTI with long standing constipation who needs to regularly fecally disimpact self.  Hx of spontaneous subdural spinal cord hemorrhage now wheelchair bound.     Impression:  Chronic constipation.     Plan:  Pt wants to speak to  first prior to any further treatment.   Per JIMBO Angeles/pt-she fecally disimpacted herself this AM with some relief.   Would consider mag citrate 1/2 bottle and mineral oil enema now and add miralax BID with MOM for breakthrough constipation.     Discussed with patient and JIMBO Angeles. 66 y/o female severe sepsis/UTI with long standing constipation who needs to regularly fecally disimpact self.  Hx of spontaneous subdural spinal cord hemorrhage now wheelchair bound.     Impression:  Chronic constipation.     Plan:  Pt wants to speak to  first prior to any further treatment.   Per JIMBO Angeles/pt-she fecally disimpacted herself this AM with some relief.   Would consider mag citrate and mineral oil enema now and add miralax BID with MOM for breakthrough constipation.  JIMBO Angeles will contact me once decision is made for further bowel treatment-cell phone provided.     Discussed with patient and JIMBO Angeles. 68 y/o female severe sepsis/UTI with long standing constipation who needs to regularly fecally disimpact self.    Hx of spontaneous subdural spinal cord hemorrhage now wheelchair bound.     Impression:  Chronic constipation.   Anemia.     Plan:  Trend H/H-no active bleeding.  Iron studies noted, awaiting hemoccult stool test.  Pt wants to speak to  first prior to any further treatment.   Per JIMBO Angeles/pt-she fecally disimpacted herself this AM with some relief.   Would consider mag citrate and mineral oil enema now and add miralax BID with MOM for breakthrough constipation.  JIMBO Angeles will contact me once decision is made for further bowel treatment-cell phone provided to RN.      Discussed with patient, Dr. Yoo, and JIMBO Angeles.

## 2018-09-21 NOTE — CONSULT NOTE ADULT - SUBJECTIVE AND OBJECTIVE BOX
Asked to evaluate the patient in the ED for sepsis from a UTI        The patient is a 66 yo F with a PMH of HTN, HLD, Type A aortic thoracic dissection 5/2009 s/p repair, s/p descending aortic aneurysm repair 09/2016, spontaneous subdural spinal cord hemorrhage s/p drainage 08/2014 with 2 untethering of the spinal cord procedures, paraplegia now wheelchair bound with multiple UTIs in the setting of self catheterization, empyema, Chronic back pain,  baclofen pump who presented to the ED with AMD and fevers to 104 in the ED.    The patient was initially hypotensive in the ED with an elevated lactate along with altered awareness.      She is S/P 2.5 L NS and antibiotics and is now normotensive but still altered.  As per the patient  who is her primary caregiver this altered awareness is common when she has a UTI.      PMH/PSH:  As above as well as:  abdominal  Type B Watched regularly  Aortic dissection, thoracic  Type A Repaired  Blindness of left eye    Chronic constipation    HTN (Hypertension)    TIA (transient ischemic attack)      SH: , no ETOH, no smoking  FH: non Contributery
CHIEF COMPLAINT: Patient is a 67y old  Female who presents with a chief complaint of lethargy, poor po intake, fever (20 Sep 2018 16:15)      HPI:  66 yo F with a PMH of HTN, HLD, h/o TIA,  Type A aortic thoracic dissection 5/2009 s/p repair, s/p descending aortic aneurysm repair 09/2016, spontaneous subdural spinal cord hemorrhage s/p drainage 08/2014 with 2 untethering of the spinal cord procedures, paraplegia now wheelchair bound , Left eye blindness s/p cornea transplant, h/o of multiple UTIs in the setting of self catheterization, empyema, Chronic back pain,  baclofen pump who presented to the ED with worsening of po intake for last 3 days, lethargy on and off, and now fevers. Patient lethargic, poor historian, but denies chest pain, reports back pain, denies abdominal pain, denies cough, headaches. As per  at bedside Tyler , patient has low po intake for last couples of days.    In Ed - T(F): 99.3 , Max: 104  HR: 96  (96 - 130) BP: 112/65 (96/62 - 114/86) RR: 23  (19 - 28) SpO2: 98%  (95% - 100%) WBC 12.7, Hb 9.9, Pl 130, INR 1.27 , lactate 2.6--> 1. 2, BUN 39, Cr 0.97, troponin 0.089,  S/P 2.5 L NS , s/p Vanco, cefepime, tylenol, pt  now more normotensive,  but still altered.  As per the patient  who is her primary caregiver this altered awareness is common when she has a UTI. (20 Sep 2018 16:15)      PMHx: PAST MEDICAL & SURGICAL HISTORY:  Dorsalgia of lumbar region  Self-catheterizes urinary bladder  Anemia  Uveitis  Osteoporosis  PAD (peripheral artery disease)  Hematoma: spinal  Paraplegia  Aortic dissection, abdominal: Type B Watched regularly  Aortic dissection, thoracic: Type A Repaired  Blindness of left eye  Chronic constipation  UTI (urinary tract infection)  TIA (transient ischemic attack)  HTN (Hypertension)  History of corneal transplant: left corneal transplant on 5/21/2018  Disorder of spine: detethering  Presence of IVC filter  S/P aortic dissection repair: Type A Dissection repair  H/O Spinal surgery: laminectomies      Allergies: Allergies    Fosamax (Unknown)    Intolerances          REVIEW OF SYSTEMS:    CONSTITUTIONAL: No weakness, fevers or chills  EYES/ENT: No visual changes;  No vertigo or throat pain   NECK: No pain or stiffness  RESPIRATORY: No cough, wheezing, hemoptysis; No shortness of breath  CARDIOVASCULAR: No chest pain or palpitations  GASTROINTESTINAL: No abdominal or epigastric pain. No nausea, vomiting, or hematemesis; No diarrhea or constipation. No melena or hematochezia.  GENITOURINARY: No dysuria, frequency or hematuria  NEUROLOGICAL: No numbness or weakness  SKIN: No itching, burning, rashes, or lesions   All other review of systems is negative unless indicated above    Vital Signs Last 24 Hrs  T(C): 36.2 (21 Sep 2018 06:10), Max: 40 (20 Sep 2018 10:01)  T(F): 97.2 (21 Sep 2018 06:10), Max: 104 (20 Sep 2018 10:01)  HR: 97 (21 Sep 2018 06:00) (91 - 130)  BP: 128/73 (21 Sep 2018 06:00) (96/62 - 146/76)  BP(mean): 86 (21 Sep 2018 06:00) (71 - 96)  RR: 21 (21 Sep 2018 06:00) (17 - 28)  SpO2: 99% (21 Sep 2018 06:00) (95% - 100%)    I&O's Summary    20 Sep 2018 07:01  -  21 Sep 2018 07:00  --------------------------------------------------------  IN: 1020 mL / OUT: 600 mL / NET: 420 mL            PHYSICAL EXAM:   Constitutional: NAD, awake and alert, well-developed  HEENT: PERR, EOMI, Normal Hearing, MMM  Neck: Soft and supple, No LAD, No JVD  Respiratory: Breath sounds are clear bilaterally, No wheezing, rales or rhonchi  Cardiovascular: S1 and S2, regular rate and rhythm, no Murmurs, gallops or rubs  Gastrointestinal: Bowel Sounds present, soft, nontender, nondistended, no guarding, no rebound  Extremities: No peripheral edema  Vascular: 2+ peripheral pulses  Neurological: A/O x 3, no focal deficits  Musculoskeletal: 5/5 strength b/l upper and lower extremities  Skin: No rashes    MEDICATIONS:  MEDICATIONS  (STANDING):  acetaminophen   Tablet .. 650 milliGRAM(s) Oral every 8 hours  aspirin enteric coated 81 milliGRAM(s) Oral daily  atorvastatin 40 milliGRAM(s) Oral at bedtime  baclofen 20 milliGRAM(s) Oral two times a day  cefepime  Injectable. 1000 milliGRAM(s) IV Push every 12 hours  dextrose 5% + sodium chloride 0.9% with potassium chloride 20 mEq/L 1000 milliLiter(s) (100 mL/Hr) IV Continuous <Continuous>  docusate sodium 100 milliGRAM(s) Oral three times a day  DULoxetine 20 milliGRAM(s) Oral two times a day  gabapentin 300 milliGRAM(s) Oral three times a day  influenza   Vaccine 0.5 milliLiter(s) IntraMuscular once  labetalol 100 milliGRAM(s) Oral every 8 hours  prednisoLONE acetate 1% Suspension 1 Drop(s) Left EYE four times a day      LABS: All Labs Reviewed:                        9.0    15.63 )-----------( 119      ( 21 Sep 2018 05:21 )             28.8     09-21    140  |  111<H>  |  21  ----------------------------<  138<H>  3.4<L>   |  23  |  0.38<L>    Ca    8.2<L>      21 Sep 2018 05:21  Phos  1.6     09-21  Mg     2.0     09-21    TPro  5.6<L>  /  Alb  2.5<L>  /  TBili  0.4  /  DBili  x   /  AST  29  /  ALT  20  /  AlkPhos  144<H>  09-21    PT/INR - ( 20 Sep 2018 10:23 )   PT: 13.8 sec;   INR: 1.27 ratio         PTT - ( 20 Sep 2018 10:23 )  PTT:32.8 sec  CARDIAC MARKERS ( 21 Sep 2018 01:04 )  0.047 ng/mL / x     / 264 U/L / x     / x      CARDIAC MARKERS ( 20 Sep 2018 19:22 )  0.062 ng/mL / x     / 275 U/L / x     / x      CARDIAC MARKERS ( 20 Sep 2018 10:23 )  0.089 ng/mL / x     / x     / x     / x          Serum Pro-Brain Natriuretic Peptide: [id number] pg/mL (09-20 @ 19:22)    Blood Culture: Organism Blood Culture PCR  Gram Stain Blood -- Gram Stain   Growth in aerobic bottle: Gram Negative Rods  Specimen Source .Blood Blood-Peripheral  Culture-Blood --          EKG: ST , INcomplete RBBB, LAFB    Telemetry: SR, occasional PVCs     ECHO:
Patient is a 67y old  Female who presents with a chief complaint of lethargy, poor po intake, fever (21 Sep 2018 07:25)    HPI:  66 yo F with a PMH of HTN, HLD, h/o TIA,  Type A aortic thoracic dissection 5/2009 s/p repair, s/p descending aortic aneurysm repair 09/2016, spontaneous subdural spinal cord hemorrhage s/p drainage 08/2014 with 2 untethering of the spinal cord procedures, paraplegia now wheelchair bound , Left eye blindness s/p cornea transplant, h/o of multiple UTIs in the setting of self catheterization, empyema, Chronic back pain,  baclofen pump who presented to the ED with worsening of po intake for last 3 days, lethargy on and off, and now fevers. Patient lethargic, poor historian, but denies chest pain, reports back pain, denies abdominal pain, denies cough, headaches. As per  at bedside Tyler , patient has low po intake for last couples of days.    In Ed - T(F): 99.3 , Max: 104  HR: 96  (96 - 130) BP: 112/65 (96/62 - 114/86) RR: 23  (19 - 28) SpO2: 98%  (95% - 100%) WBC 12.7, Hb 9.9, Pl 130, INR 1.27 , lactate 2.6--> 1. 2, BUN 39, Cr 0.97, troponin 0.089,  S/P 2.5 L NS , s/p Vanco, cefepime, tylenol, pt  now more normotensive,  but still altered.  As per the patient  who is her primary caregiver this altered awareness is common when she has a UTI. (20 Sep 2018 16:15)    9/21/2018-  Pt is poor historian as above.   Pt reports chronic constipation.   Reports having to fecally disimpacted self.   Per JIMBO [place]-pt disimpacted herself this morning with relief.   Denies any abdominal pain, n/v, rectal bleeding, or melena.     PAST MEDICAL & SURGICAL HISTORY:  Dorsalgia of lumbar region  Self-catheterizes urinary bladder  Anemia  Uveitis  Osteoporosis  PAD (peripheral artery disease)  Hematoma: spinal  Paraplegia  Aortic dissection, abdominal: Type B Watched regularly  Aortic dissection, thoracic: Type A Repaired  Blindness of left eye  Chronic constipation  UTI (urinary tract infection)  TIA (transient ischemic attack)  HTN (Hypertension)  History of corneal transplant: left corneal transplant on 5/21/2018  Disorder of spine: detethering  Presence of IVC filter  S/P aortic dissection repair: Type A Dissection repair  H/O Spinal surgery: laminectomies    MEDICATIONS  (STANDING):  acetaminophen   Tablet .. 650 milliGRAM(s) Oral every 8 hours  aspirin enteric coated 81 milliGRAM(s) Oral daily  atorvastatin 40 milliGRAM(s) Oral at bedtime  baclofen 20 milliGRAM(s) Oral two times a day  cefepime  Injectable. 1000 milliGRAM(s) IV Push every 12 hours  dextrose 5% + sodium chloride 0.9% with potassium chloride 20 mEq/L 1000 milliLiter(s) (100 mL/Hr) IV Continuous <Continuous>  docusate sodium 100 milliGRAM(s) Oral three times a day  DULoxetine 20 milliGRAM(s) Oral two times a day  gabapentin 300 milliGRAM(s) Oral three times a day  influenza   Vaccine 0.5 milliLiter(s) IntraMuscular once  labetalol 100 milliGRAM(s) Oral every 8 hours  magnesium citrate Solution 296 milliLiter(s) Oral once  mineral oil enema 133 milliLiter(s) Rectal once  prednisoLONE acetate 1% Suspension 1 Drop(s) Left EYE four times a day    MEDICATIONS  (PRN):  aluminum hydroxide/magnesium hydroxide/simethicone Suspension 30 milliLiter(s) Oral every 4 hours PRN Dyspepsia  magnesium hydroxide Suspension 30 milliLiter(s) Oral two times a day PRN Constipation  ondansetron Injectable 4 milliGRAM(s) IV Push every 6 hours PRN Nausea  oxyCODONE    IR 5 milliGRAM(s) Oral every 4 hours PRN Moderate Pain (4 - 6)  senna 2 Tablet(s) Oral at bedtime PRN Constipation    Allergies  Fosamax (Unknown)    FAMILY HISTORY:  Family history of Alzheimer's disease (Father, Mother)    REVIEW OF SYSTEMS:  CONSTITUTIONAL: + weakness, lack of appetite.  No fevers or chills  EYES/ENT: No visual changes;  No vertigo or throat pain   NECK: No pain or stiffness  RESPIRATORY: No cough, wheezing, hemoptysis; No shortness of breath  CARDIOVASCULAR: No chest pain or palpitations  GASTROINTESTINAL: Per HPI.   GENITOURINARY: No dysuria, frequency or hematuria  NEUROLOGICAL: No numbness or weakness  SKIN: No itching, burning, rashes, or lesions   PSYCH: Normal mood and affect  All other review of systems is negative unless indicated above.    Vital Signs Last 24 Hrs  T(C): 36.2 (21 Sep 2018 06:10), Max: 37.4 (20 Sep 2018 14:30)  T(F): 97.2 (21 Sep 2018 06:10), Max: 99.3 (20 Sep 2018 14:30)  HR: 87 (21 Sep 2018 09:01) (83 - 122)  BP: 103/61 (21 Sep 2018 09:01) (96/62 - 146/76)  BP(mean): 71 (21 Sep 2018 09:01) (71 - 96)  RR: 25 (21 Sep 2018 09:01) (17 - 28)  SpO2: 97% (21 Sep 2018 09:01) (95% - 100%)    PHYSICAL EXAM:  Constitutional: Appears frail in nad.   HEENT: MMM  Neck: No LAD  Respiratory: CTAB  Cardiovascular: S1 and S2, RRR, no M/G/R  Gastrointestinal: BS+, soft, NT/ND  Extremities: No peripheral edema  Vascular: 2+ peripheral pulses  Neurological: A/O x 3, no focal deficits  Psychiatric: Normal mood, normal affect  Skin: No rashes    LABS:                        9.0    15.63 )-----------( 119      ( 21 Sep 2018 05:21 )             28.8     09-21    140  |  111<H>  |  21  ----------------------------<  138<H>  3.4<L>   |  23  |  0.38<L>    Ca    8.2<L>      21 Sep 2018 05:21  Phos  1.6     09-21  Mg     2.0     09-21    TPro  5.6<L>  /  Alb  2.5<L>  /  TBili  0.4  /  DBili  x   /  AST  29  /  ALT  20  /  AlkPhos  144<H>  09-21    PT/INR - ( 20 Sep 2018 10:23 )   PT: 13.8 sec;   INR: 1.27 ratio         PTT - ( 20 Sep 2018 10:23 )  PTT:32.8 sec  LIVER FUNCTIONS - ( 21 Sep 2018 05:21 )  Alb: 2.5 g/dL / Pro: 5.6 gm/dL / ALK PHOS: 144 U/L / ALT: 20 U/L / AST: 29 U/L / GGT: x             RADIOLOGY & ADDITIONAL STUDIES:
Patient is a 67y old  Female who presents with a chief complaint of lethargy, poor po intake, fever (21 Sep 2018 10:38)    HPI:  68 yo F with a PMH of HTN, HLD, h/o TIA,  Type A aortic thoracic dissection 2009 s/p repair, s/p descending aortic aneurysm repair 2016, spontaneous subdural spinal cord hemorrhage s/p drainage 2014 with 2 untethering of the spinal cord procedures, paraplegia now wheelchair bound , Left eye blindness s/p cornea transplant, h/o of multiple UTIs in the setting of self catheterization, empyema, Chronic back pain,  baclofen pump who presented to the ED  with worsening of po intake for last 3 days, lethargy on and off, and now fevers. Here pt noted with fever to 104, elevated wbc ct,positive ua and blood cx growing Ecoli/KLPN, pt has grown these organisms on prior cx, she was given IV cefepime.         PMH: as above  PSH: as above  Meds: per reconciliation sheet, noted below  MEDICATIONS  (STANDING):  acetaminophen   Tablet .. 650 milliGRAM(s) Oral every 8 hours  aspirin enteric coated 81 milliGRAM(s) Oral daily  atorvastatin 40 milliGRAM(s) Oral at bedtime  baclofen 20 milliGRAM(s) Oral two times a day  cefepime   IVPB 2000 milliGRAM(s) IV Intermittent every 12 hours  docusate sodium 100 milliGRAM(s) Oral three times a day  DULoxetine 20 milliGRAM(s) Oral two times a day  gabapentin 300 milliGRAM(s) Oral three times a day  influenza   Vaccine 0.5 milliLiter(s) IntraMuscular once  labetalol 100 milliGRAM(s) Oral every 8 hours  potassium chloride    Tablet ER 20 milliEquivalent(s) Oral once  prednisoLONE acetate 1% Suspension 1 Drop(s) Left EYE four times a day      Allergies    Fosamax (Unknown)    Intolerances      Social: no smoking, no alcohol, no illegal drugs; no recent travel, no exposure to TB  FAMILY HISTORY:  Family history of Alzheimer's disease (Father, Mother)     no history of premature cardiovascular disease in first degree relatives    ROS:  no HA, no dizziness, no sore throat, no blurry vision, no CP, no palpitations, no SOB, no cough,  no diarrhea, no N/V no leg pain, no claudication, no rash, no joint aches, no rectal pain or bleeding, no night sweats  All other systems reviewed and are negative    Vital Signs Last 24 Hrs  T(C): 36.4 (21 Sep 2018 11:55), Max: 37.4 (20 Sep 2018 19:20)  T(F): 97.5 (21 Sep 2018 11:55), Max: 99.3 (20 Sep 2018 19:20)  HR: 86 (21 Sep 2018 11:55) (83 - 102)  BP: 115/63 (21 Sep 2018 11:55) (103/61 - 146/76)  BP(mean): 71 (21 Sep 2018 09:01) (71 - 96)  RR: 19 (21 Sep 2018 11:55) (17 - 28)  SpO2: 97% (21 Sep 2018 11:55) (96% - 100%)  Daily     Daily     PE:    Constitutional: frail looking  HEENT: NC/AT, EOMI, PERRLA, conjunctivae clear; ears and nose atraumatic; pharynx benign  Neck: supple; thyroid not palpable  Back: no tenderness  Respiratory: respiratory effort normal; clear to auscultation  Cardiovascular: S1S2 regular, no murmurs  Abdomen: soft, not tender, not distended, positive BS; liver and spleen WNL  Genitourinary: no suprapubic tenderness  Lymphatic: no LN palpable  Musculoskeletal: no muscle tenderness, no joint swelling or tenderness  Extremities: no pedal edema  Neurological/ Psychiatric:  moving all extremities  Skin: no rashes; no palpable lesions    Labs: all available labs reviewed                        9.0    15.63 )-----------( 119      ( 21 Sep 2018 05:21 )             28.8         140  |  111<H>  |  21  ----------------------------<  138<H>  3.4<L>   |  23  |  0.38<L>    Ca    8.2<L>      21 Sep 2018 05:21  Phos  1.6       Mg     2.0         TPro  5.6<L>  /  Alb  2.5<L>  /  TBili  0.4  /  DBili  x   /  AST  29  /  ALT  20  /  AlkPhos  144<H>       LIVER FUNCTIONS - ( 21 Sep 2018 05:21 )  Alb: 2.5 g/dL / Pro: 5.6 gm/dL / ALK PHOS: 144 U/L / ALT: 20 U/L / AST: 29 U/L / GGT: x           Urinalysis Basic - ( 20 Sep 2018 10:25 )    Color: Yellow / Appearance: Slightly Turbid / S.015 / pH: x  Gluc: x / Ketone: Small  / Bili: Negative / Urobili: 1 mg/dL   Blood: x / Protein: 100 mg/dL / Nitrite: Positive   Leuk Esterase: Moderate / RBC: 6-10 /HPF / WBC >50   Sq Epi: x / Non Sq Epi: Few / Bacteria: Many      Culture - Urine (18 @ 10:25)    Specimen Source: .Urine Clean Catch (Midstream)    Culture Results:   Culture grew 3 or more types of organisms which indicate  collection contamination; consider recollection only if clinically  indicated.    Culture - Blood (18 @ 10:23)    Gram Stain:   Growth in aerobic bottle: Gram Negative Rods  Growth in anaerobic bottle: Gram Negative Rods    -  Escherichia coli: Detec    -  Klebsiella pneumoniae: Detec    Specimen Source: .Blood Blood-Peripheral    Organism: Blood Culture PCR    Culture Results:   Growth in aerobic bottle: Gram Negative Rods  Growth in anaerobic bottle: Gram Negative Rods  "Due to technical problems, Proteus sp. will Not be reported as part of  the BCID panel until further notice"  ***Blood Panel PCR results on this specimenare available  approximately 3 hours after the Gram stain result.***  Gram stain, PCR, and/or culture results may not always  correspond due to difference in methodologies.  ************************************************************  This PCR assaywas performed using [employer].  The following targets are tested for: Enterococcus,  vancomycin resistant enterococci, Listeria monocytogenes,  coagulase negative staphylococci, S. aureus,  methicillin resistant S. aureus, Streptococcus agalactiae  (Group B), S. pneumoniae, S. pyogenes (Group A),  Acinetobacter baumannii, Enterobacter cloacae, E. coli,  Klebsiella oxytoca, K. pneumoniae, Proteus sp.,  Serratia marcescens, Haemophilus influenzae,  Neisseria meningitidis, Pseudomonas aeruginosa, Candida  albicans, C. glabrata, C krusei, C parapsilosis,  C. tropicalis and the KPC resistance gene.    Organism Identification: Blood Culture PCR    Method Type: PCR        Radiology: all available radiological tests reviewed    < from: US Kidney and Bladder (18 @ 10:50) >  EXAM:  US KIDNEYS AND BLADDER                            PROCEDURE DATE:  2018          INTERPRETATION:  Ultrasound of the kidneys/bladder         CLINICAL INFORMATION:   History of aortic dissection. UTI, rule out   obstruction.     TECHNIQUE:  Transabdominal sonography was performed.    COMPARISON: Abdominal ultrasound dated 2016 and CT angiogram of the   chest dated 2018 are available for review.    FINDINGS:       The right kidney measures 11.1 cm in length. Its contours are smooth.   There is redemonstration of an hyperechoic 0.9 x 0.6 cm lesion within the   superior pole right kidney consistent with angiomyolipoma, as visualized   on prior ultrasound.  No calculi are seen.  No hydronephrosis is present.     The left kidney measures 11.3 cm in length. Its contours are smooth.   There is a simple mid-lower pole 2.9 x 1.1 cm cyst. No focal lesion is   found.  No calculi are seen.  No hydronephrosis is present.    The bladder demonstrated small amount of debris. Left ureteral jet   demonstrates ureteral patency.  No bladder mass or calculus is   recognized.  No wall thickening is recognized.    No free fluid is found in the pelvis.      The visualized abdominal aorta redemonstrates an extensive abdominal   aortic dissection as visualized on prior abdominal ultrasound of   2016. The abdominal aorta measures 3.8 cm in greatest anterior to   posterior dimension.  The IVC and retroperitoneal structures appear   intact.    Miscellaneous: Incidentally noted within the peripheral right hepatic   lobe is a 0.7 cm focal hyperechoic lesion as visualized on prior   ultrasound likely representing hemangioma. Within the posterior right   hepatic lobe there is a 0.5 cm hypoechoic simple cyst. The spleen is top   normal in size.    IMPRESSION:        No obstructive uropathy, hydronephrosis, or renal calculus.    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    Small amount of debris within the bladder, which is otherwise   unremarkable.    Redemonstration of hyperechoic 0.7 cm lesion within the right hepatic   lobe likely representing hemangioma.     Redemonstration of abdominal aortic dissection and aneurysm measuring up   to 3.8 cm.        Advanced directives addressed: full resuscitation

## 2018-09-21 NOTE — SWALLOW BEDSIDE ASSESSMENT ADULT - SWALLOW EVAL: CRITERIA FOR SKILLED INTERVENTION MET
no problems identified which require skilled intervention/ACUTE SPEECH PATHOLOGY INTERVENTION IS NOT CLINICALLY WARRANTED AND WOULD NOT . NO SPEECH-LANGUAGE PATHOLOGY IS EVIDENT AND OROPHARYNGEAL SWALLOWING ABILITIES ARE FUNCTIONAL. GIVEN ABOVE, WILL NOT ACTIVELY FOLLOW. RECONSULT PRN AS NEEDED.

## 2018-09-21 NOTE — CONSULT NOTE ADULT - ASSESSMENT
67 f with extensive medical hx presents with urosepsis with minimal elevation in CE     THere is no evidence for ACS, minimal elevation in CE are likley due to metabolic strain from infection.     It is reasonable to check 2 d echo to evaluate LV function and evaluate valve disease    Although an ischemic evaluation is reasonable give risk factors , i would defer it until after infection is completely resolved, and conduct it as an outpatient     Patient is stable to transfer out of critical care unit to tele

## 2018-09-22 LAB
-  AMIKACIN: SIGNIFICANT CHANGE UP
-  AMPICILLIN/SULBACTAM: SIGNIFICANT CHANGE UP
-  AMPICILLIN: SIGNIFICANT CHANGE UP
-  AZTREONAM: SIGNIFICANT CHANGE UP
-  CEFAZOLIN: SIGNIFICANT CHANGE UP
-  CEFEPIME: SIGNIFICANT CHANGE UP
-  CEFOXITIN: SIGNIFICANT CHANGE UP
-  CEFTRIAXONE: SIGNIFICANT CHANGE UP
-  CIPROFLOXACIN: SIGNIFICANT CHANGE UP
-  ERTAPENEM: SIGNIFICANT CHANGE UP
-  GENTAMICIN: SIGNIFICANT CHANGE UP
-  IMIPENEM: SIGNIFICANT CHANGE UP
-  LEVOFLOXACIN: SIGNIFICANT CHANGE UP
-  MEROPENEM: SIGNIFICANT CHANGE UP
-  PIPERACILLIN/TAZOBACTAM: SIGNIFICANT CHANGE UP
-  TOBRAMYCIN: SIGNIFICANT CHANGE UP
-  TRIMETHOPRIM/SULFAMETHOXAZOLE: SIGNIFICANT CHANGE UP
ADD ON TEST-SPECIMEN IN LAB: SIGNIFICANT CHANGE UP
ALBUMIN SERPL ELPH-MCNC: 2.5 G/DL — LOW (ref 3.3–5)
ALP SERPL-CCNC: 153 U/L — HIGH (ref 40–120)
ALT FLD-CCNC: 22 U/L — SIGNIFICANT CHANGE UP (ref 12–78)
ANION GAP SERPL CALC-SCNC: 6 MMOL/L — SIGNIFICANT CHANGE UP (ref 5–17)
AST SERPL-CCNC: 23 U/L — SIGNIFICANT CHANGE UP (ref 15–37)
BILIRUB SERPL-MCNC: 0.4 MG/DL — SIGNIFICANT CHANGE UP (ref 0.2–1.2)
BUN SERPL-MCNC: 14 MG/DL — SIGNIFICANT CHANGE UP (ref 7–23)
CALCIUM SERPL-MCNC: 8.3 MG/DL — LOW (ref 8.5–10.1)
CHLORIDE SERPL-SCNC: 113 MMOL/L — HIGH (ref 96–108)
CO2 SERPL-SCNC: 22 MMOL/L — SIGNIFICANT CHANGE UP (ref 22–31)
CREAT SERPL-MCNC: 0.3 MG/DL — LOW (ref 0.5–1.3)
GLUCOSE SERPL-MCNC: 100 MG/DL — HIGH (ref 70–99)
HCT VFR BLD CALC: 28.3 % — LOW (ref 34.5–45)
HGB BLD-MCNC: 9 G/DL — LOW (ref 11.5–15.5)
MCHC RBC-ENTMCNC: 25.8 PG — LOW (ref 27–34)
MCHC RBC-ENTMCNC: 31.8 GM/DL — LOW (ref 32–36)
MCV RBC AUTO: 81.1 FL — SIGNIFICANT CHANGE UP (ref 80–100)
METHOD TYPE: SIGNIFICANT CHANGE UP
NRBC # BLD: 0 /100 WBCS — SIGNIFICANT CHANGE UP (ref 0–0)
PHOSPHATE SERPL-MCNC: 1.4 MG/DL — LOW (ref 2.5–4.5)
PLATELET # BLD AUTO: 120 K/UL — LOW (ref 150–400)
POTASSIUM SERPL-MCNC: 3.8 MMOL/L — SIGNIFICANT CHANGE UP (ref 3.5–5.3)
POTASSIUM SERPL-SCNC: 3.8 MMOL/L — SIGNIFICANT CHANGE UP (ref 3.5–5.3)
PROT SERPL-MCNC: 5.6 GM/DL — LOW (ref 6–8.3)
RBC # BLD: 3.49 M/UL — LOW (ref 3.8–5.2)
RBC # FLD: 14.8 % — HIGH (ref 10.3–14.5)
SODIUM SERPL-SCNC: 141 MMOL/L — SIGNIFICANT CHANGE UP (ref 135–145)
WBC # BLD: 12.94 K/UL — HIGH (ref 3.8–10.5)
WBC # FLD AUTO: 12.94 K/UL — HIGH (ref 3.8–10.5)

## 2018-09-22 PROCEDURE — 93010 ELECTROCARDIOGRAM REPORT: CPT

## 2018-09-22 RX ORDER — ZOLPIDEM TARTRATE 10 MG/1
5 TABLET ORAL AT BEDTIME
Qty: 0 | Refills: 0 | Status: DISCONTINUED | OUTPATIENT
Start: 2018-09-22 | End: 2018-09-24

## 2018-09-22 RX ADMIN — OXYCODONE HYDROCHLORIDE 5 MILLIGRAM(S): 5 TABLET ORAL at 16:07

## 2018-09-22 RX ADMIN — CEFEPIME 100 MILLIGRAM(S): 1 INJECTION, POWDER, FOR SOLUTION INTRAMUSCULAR; INTRAVENOUS at 06:18

## 2018-09-22 RX ADMIN — OXYCODONE HYDROCHLORIDE 5 MILLIGRAM(S): 5 TABLET ORAL at 11:50

## 2018-09-22 RX ADMIN — Medication 650 MILLIGRAM(S): at 17:55

## 2018-09-22 RX ADMIN — GABAPENTIN 300 MILLIGRAM(S): 400 CAPSULE ORAL at 16:04

## 2018-09-22 RX ADMIN — Medication 100 MILLIGRAM(S): at 06:19

## 2018-09-22 RX ADMIN — OXYCODONE HYDROCHLORIDE 5 MILLIGRAM(S): 5 TABLET ORAL at 07:14

## 2018-09-22 RX ADMIN — CEFEPIME 100 MILLIGRAM(S): 1 INJECTION, POWDER, FOR SOLUTION INTRAMUSCULAR; INTRAVENOUS at 17:55

## 2018-09-22 RX ADMIN — Medication 1 DROP(S): at 17:54

## 2018-09-22 RX ADMIN — Medication 1 DROP(S): at 00:12

## 2018-09-22 RX ADMIN — OXYCODONE HYDROCHLORIDE 5 MILLIGRAM(S): 5 TABLET ORAL at 21:00

## 2018-09-22 RX ADMIN — DULOXETINE HYDROCHLORIDE 20 MILLIGRAM(S): 30 CAPSULE, DELAYED RELEASE ORAL at 17:54

## 2018-09-22 RX ADMIN — OXYCODONE HYDROCHLORIDE 5 MILLIGRAM(S): 5 TABLET ORAL at 17:14

## 2018-09-22 RX ADMIN — Medication 667 MILLIGRAM(S): at 14:58

## 2018-09-22 RX ADMIN — Medication 100 MILLIGRAM(S): at 20:20

## 2018-09-22 RX ADMIN — Medication 650 MILLIGRAM(S): at 19:43

## 2018-09-22 RX ADMIN — Medication 20 MILLIGRAM(S): at 06:19

## 2018-09-22 RX ADMIN — GABAPENTIN 300 MILLIGRAM(S): 400 CAPSULE ORAL at 06:19

## 2018-09-22 RX ADMIN — ATORVASTATIN CALCIUM 40 MILLIGRAM(S): 80 TABLET, FILM COATED ORAL at 20:20

## 2018-09-22 RX ADMIN — Medication 81 MILLIGRAM(S): at 11:52

## 2018-09-22 RX ADMIN — Medication 1 DROP(S): at 06:19

## 2018-09-22 RX ADMIN — Medication 650 MILLIGRAM(S): at 20:19

## 2018-09-22 RX ADMIN — GABAPENTIN 300 MILLIGRAM(S): 400 CAPSULE ORAL at 20:20

## 2018-09-22 RX ADMIN — Medication 20 MILLIGRAM(S): at 17:55

## 2018-09-22 RX ADMIN — OXYCODONE HYDROCHLORIDE 5 MILLIGRAM(S): 5 TABLET ORAL at 06:21

## 2018-09-22 RX ADMIN — ZOLPIDEM TARTRATE 5 MILLIGRAM(S): 10 TABLET ORAL at 00:12

## 2018-09-22 RX ADMIN — DULOXETINE HYDROCHLORIDE 20 MILLIGRAM(S): 30 CAPSULE, DELAYED RELEASE ORAL at 06:19

## 2018-09-22 RX ADMIN — Medication 667 MILLIGRAM(S): at 17:54

## 2018-09-22 RX ADMIN — Medication 1 DROP(S): at 23:37

## 2018-09-22 RX ADMIN — Medication 650 MILLIGRAM(S): at 18:55

## 2018-09-22 RX ADMIN — OXYCODONE HYDROCHLORIDE 5 MILLIGRAM(S): 5 TABLET ORAL at 15:50

## 2018-09-22 RX ADMIN — OXYCODONE HYDROCHLORIDE 5 MILLIGRAM(S): 5 TABLET ORAL at 20:19

## 2018-09-22 NOTE — PROVIDER CONTACT NOTE (OTHER) - SITUATION
I spoke to Jeremy and she is aware of consult
Spoke to Shayla @ [facility].
Answering service notified of consult.
Consult called into answering service.
Spoke to [email or web address]@ [facility].

## 2018-09-22 NOTE — PROVIDER CONTACT NOTE (OTHER) - NAME OF MD/NP/PA/DO NOTIFIED:
INFECTIOUS DISEASE, SPOKE WITH DR. AYLA MD IS AWARE OF CONSULTS.
Dr Decker
Dr Yoo
Dr. Frankel
Dr. Yoo

## 2018-09-23 LAB
-  AMIKACIN: SIGNIFICANT CHANGE UP
-  AMPICILLIN/SULBACTAM: SIGNIFICANT CHANGE UP
-  AMPICILLIN: SIGNIFICANT CHANGE UP
-  AZTREONAM: SIGNIFICANT CHANGE UP
-  CEFAZOLIN: SIGNIFICANT CHANGE UP
-  CEFEPIME: SIGNIFICANT CHANGE UP
-  CEFOXITIN: SIGNIFICANT CHANGE UP
-  CEFTRIAXONE: SIGNIFICANT CHANGE UP
-  CIPROFLOXACIN: SIGNIFICANT CHANGE UP
-  ERTAPENEM: SIGNIFICANT CHANGE UP
-  GENTAMICIN: SIGNIFICANT CHANGE UP
-  IMIPENEM: SIGNIFICANT CHANGE UP
-  LEVOFLOXACIN: SIGNIFICANT CHANGE UP
-  MEROPENEM: SIGNIFICANT CHANGE UP
-  PIPERACILLIN/TAZOBACTAM: SIGNIFICANT CHANGE UP
-  TOBRAMYCIN: SIGNIFICANT CHANGE UP
-  TRIMETHOPRIM/SULFAMETHOXAZOLE: SIGNIFICANT CHANGE UP
ALBUMIN SERPL ELPH-MCNC: 2.8 G/DL — LOW (ref 3.3–5)
ALP SERPL-CCNC: 153 U/L — HIGH (ref 40–120)
ALT FLD-CCNC: 19 U/L — SIGNIFICANT CHANGE UP (ref 12–78)
ANION GAP SERPL CALC-SCNC: 8 MMOL/L — SIGNIFICANT CHANGE UP (ref 5–17)
AST SERPL-CCNC: 17 U/L — SIGNIFICANT CHANGE UP (ref 15–37)
BILIRUB SERPL-MCNC: 0.6 MG/DL — SIGNIFICANT CHANGE UP (ref 0.2–1.2)
BUN SERPL-MCNC: 8 MG/DL — SIGNIFICANT CHANGE UP (ref 7–23)
CALCIUM SERPL-MCNC: 8.7 MG/DL — SIGNIFICANT CHANGE UP (ref 8.5–10.1)
CHLORIDE SERPL-SCNC: 108 MMOL/L — SIGNIFICANT CHANGE UP (ref 96–108)
CO2 SERPL-SCNC: 23 MMOL/L — SIGNIFICANT CHANGE UP (ref 22–31)
CREAT SERPL-MCNC: 0.28 MG/DL — LOW (ref 0.5–1.3)
CULTURE RESULTS: SIGNIFICANT CHANGE UP
CULTURE RESULTS: SIGNIFICANT CHANGE UP
GLUCOSE SERPL-MCNC: 112 MG/DL — HIGH (ref 70–99)
HCT VFR BLD CALC: 30.6 % — LOW (ref 34.5–45)
HGB BLD-MCNC: 9.8 G/DL — LOW (ref 11.5–15.5)
MCHC RBC-ENTMCNC: 25.5 PG — LOW (ref 27–34)
MCHC RBC-ENTMCNC: 32 GM/DL — SIGNIFICANT CHANGE UP (ref 32–36)
MCV RBC AUTO: 79.7 FL — LOW (ref 80–100)
METHOD TYPE: SIGNIFICANT CHANGE UP
NRBC # BLD: 0 /100 WBCS — SIGNIFICANT CHANGE UP (ref 0–0)
ORGANISM # SPEC MICROSCOPIC CNT: SIGNIFICANT CHANGE UP
PLATELET # BLD AUTO: 133 K/UL — LOW (ref 150–400)
POTASSIUM SERPL-MCNC: 3.5 MMOL/L — SIGNIFICANT CHANGE UP (ref 3.5–5.3)
POTASSIUM SERPL-SCNC: 3.5 MMOL/L — SIGNIFICANT CHANGE UP (ref 3.5–5.3)
PROT SERPL-MCNC: 6 GM/DL — SIGNIFICANT CHANGE UP (ref 6–8.3)
RBC # BLD: 3.84 M/UL — SIGNIFICANT CHANGE UP (ref 3.8–5.2)
RBC # FLD: 14.6 % — HIGH (ref 10.3–14.5)
SODIUM SERPL-SCNC: 139 MMOL/L — SIGNIFICANT CHANGE UP (ref 135–145)
SPECIMEN SOURCE: SIGNIFICANT CHANGE UP
SPECIMEN SOURCE: SIGNIFICANT CHANGE UP
T3 SERPL-MCNC: 87 NG/DL — SIGNIFICANT CHANGE UP
WBC # BLD: 10.29 K/UL — SIGNIFICANT CHANGE UP (ref 3.8–10.5)
WBC # FLD AUTO: 10.29 K/UL — SIGNIFICANT CHANGE UP (ref 3.8–10.5)

## 2018-09-23 RX ORDER — LABETALOL HCL 100 MG
200 TABLET ORAL EVERY 8 HOURS
Qty: 0 | Refills: 0 | Status: DISCONTINUED | OUTPATIENT
Start: 2018-09-23 | End: 2018-09-24

## 2018-09-23 RX ORDER — CALCIUM ACETATE 667 MG
667 TABLET ORAL
Qty: 0 | Refills: 0 | Status: DISCONTINUED | OUTPATIENT
Start: 2018-09-23 | End: 2018-09-24

## 2018-09-23 RX ORDER — POTASSIUM PHOSPHATE, MONOBASIC POTASSIUM PHOSPHATE, DIBASIC 236; 224 MG/ML; MG/ML
15 INJECTION, SOLUTION INTRAVENOUS ONCE
Qty: 0 | Refills: 0 | Status: COMPLETED | OUTPATIENT
Start: 2018-09-23 | End: 2018-09-23

## 2018-09-23 RX ADMIN — Medication 1 DROP(S): at 22:29

## 2018-09-23 RX ADMIN — OXYCODONE HYDROCHLORIDE 5 MILLIGRAM(S): 5 TABLET ORAL at 06:10

## 2018-09-23 RX ADMIN — OXYCODONE HYDROCHLORIDE 5 MILLIGRAM(S): 5 TABLET ORAL at 10:19

## 2018-09-23 RX ADMIN — Medication 667 MILLIGRAM(S): at 08:55

## 2018-09-23 RX ADMIN — Medication 667 MILLIGRAM(S): at 13:07

## 2018-09-23 RX ADMIN — Medication 81 MILLIGRAM(S): at 10:19

## 2018-09-23 RX ADMIN — Medication 200 MILLIGRAM(S): at 21:31

## 2018-09-23 RX ADMIN — POTASSIUM PHOSPHATE, MONOBASIC POTASSIUM PHOSPHATE, DIBASIC 62.5 MILLIMOLE(S): 236; 224 INJECTION, SOLUTION INTRAVENOUS at 10:19

## 2018-09-23 RX ADMIN — CEFEPIME 100 MILLIGRAM(S): 1 INJECTION, POWDER, FOR SOLUTION INTRAMUSCULAR; INTRAVENOUS at 19:07

## 2018-09-23 RX ADMIN — DULOXETINE HYDROCHLORIDE 20 MILLIGRAM(S): 30 CAPSULE, DELAYED RELEASE ORAL at 19:07

## 2018-09-23 RX ADMIN — ATORVASTATIN CALCIUM 40 MILLIGRAM(S): 80 TABLET, FILM COATED ORAL at 21:30

## 2018-09-23 RX ADMIN — Medication 1 DROP(S): at 13:07

## 2018-09-23 RX ADMIN — CEFEPIME 100 MILLIGRAM(S): 1 INJECTION, POWDER, FOR SOLUTION INTRAMUSCULAR; INTRAVENOUS at 06:08

## 2018-09-23 RX ADMIN — DULOXETINE HYDROCHLORIDE 20 MILLIGRAM(S): 30 CAPSULE, DELAYED RELEASE ORAL at 06:08

## 2018-09-23 RX ADMIN — Medication 100 MILLIGRAM(S): at 06:08

## 2018-09-23 RX ADMIN — Medication 650 MILLIGRAM(S): at 13:07

## 2018-09-23 RX ADMIN — Medication 20 MILLIGRAM(S): at 06:08

## 2018-09-23 RX ADMIN — Medication 650 MILLIGRAM(S): at 22:30

## 2018-09-23 RX ADMIN — Medication 100 MILLIGRAM(S): at 13:07

## 2018-09-23 RX ADMIN — Medication 650 MILLIGRAM(S): at 23:00

## 2018-09-23 RX ADMIN — GABAPENTIN 300 MILLIGRAM(S): 400 CAPSULE ORAL at 21:30

## 2018-09-23 RX ADMIN — Medication 1 DROP(S): at 06:08

## 2018-09-23 RX ADMIN — Medication 20 MILLIGRAM(S): at 19:07

## 2018-09-23 RX ADMIN — ZOLPIDEM TARTRATE 5 MILLIGRAM(S): 10 TABLET ORAL at 02:06

## 2018-09-23 RX ADMIN — OXYCODONE HYDROCHLORIDE 5 MILLIGRAM(S): 5 TABLET ORAL at 21:30

## 2018-09-23 RX ADMIN — GABAPENTIN 300 MILLIGRAM(S): 400 CAPSULE ORAL at 06:10

## 2018-09-23 RX ADMIN — OXYCODONE HYDROCHLORIDE 5 MILLIGRAM(S): 5 TABLET ORAL at 22:30

## 2018-09-23 RX ADMIN — ZOLPIDEM TARTRATE 5 MILLIGRAM(S): 10 TABLET ORAL at 22:29

## 2018-09-23 RX ADMIN — Medication 1 DROP(S): at 19:06

## 2018-09-23 RX ADMIN — GABAPENTIN 300 MILLIGRAM(S): 400 CAPSULE ORAL at 13:07

## 2018-09-23 RX ADMIN — Medication 667 MILLIGRAM(S): at 19:06

## 2018-09-24 ENCOUNTER — TRANSCRIPTION ENCOUNTER (OUTPATIENT)
Age: 68
End: 2018-09-24

## 2018-09-24 VITALS
DIASTOLIC BLOOD PRESSURE: 59 MMHG | RESPIRATION RATE: 18 BRPM | SYSTOLIC BLOOD PRESSURE: 129 MMHG | TEMPERATURE: 98 F | HEART RATE: 88 BPM | OXYGEN SATURATION: 97 %

## 2018-09-24 LAB
ANION GAP SERPL CALC-SCNC: 6 MMOL/L — SIGNIFICANT CHANGE UP (ref 5–17)
BUN SERPL-MCNC: 7 MG/DL — SIGNIFICANT CHANGE UP (ref 7–23)
CALCIUM SERPL-MCNC: 8.4 MG/DL — LOW (ref 8.5–10.1)
CHLORIDE SERPL-SCNC: 108 MMOL/L — SIGNIFICANT CHANGE UP (ref 96–108)
CO2 SERPL-SCNC: 26 MMOL/L — SIGNIFICANT CHANGE UP (ref 22–31)
CREAT SERPL-MCNC: 0.3 MG/DL — LOW (ref 0.5–1.3)
GLUCOSE SERPL-MCNC: 113 MG/DL — HIGH (ref 70–99)
HCT VFR BLD CALC: 32.1 % — LOW (ref 34.5–45)
HGB BLD-MCNC: 10.4 G/DL — LOW (ref 11.5–15.5)
MCHC RBC-ENTMCNC: 25.9 PG — LOW (ref 27–34)
MCHC RBC-ENTMCNC: 32.4 GM/DL — SIGNIFICANT CHANGE UP (ref 32–36)
MCV RBC AUTO: 79.9 FL — LOW (ref 80–100)
NRBC # BLD: 0 /100 WBCS — SIGNIFICANT CHANGE UP (ref 0–0)
PHOSPHATE SERPL-MCNC: 2.4 MG/DL — LOW (ref 2.5–4.5)
PLATELET # BLD AUTO: 181 K/UL — SIGNIFICANT CHANGE UP (ref 150–400)
POTASSIUM SERPL-MCNC: 3.5 MMOL/L — SIGNIFICANT CHANGE UP (ref 3.5–5.3)
POTASSIUM SERPL-SCNC: 3.5 MMOL/L — SIGNIFICANT CHANGE UP (ref 3.5–5.3)
RBC # BLD: 4.02 M/UL — SIGNIFICANT CHANGE UP (ref 3.8–5.2)
RBC # FLD: 14.6 % — HIGH (ref 10.3–14.5)
SODIUM SERPL-SCNC: 140 MMOL/L — SIGNIFICANT CHANGE UP (ref 135–145)
WBC # BLD: 10.87 K/UL — HIGH (ref 3.8–10.5)
WBC # FLD AUTO: 10.87 K/UL — HIGH (ref 3.8–10.5)

## 2018-09-24 RX ORDER — CEFUROXIME AXETIL 250 MG
1 TABLET ORAL
Qty: 20 | Refills: 0 | OUTPATIENT
Start: 2018-09-24 | End: 2018-10-03

## 2018-09-24 RX ORDER — CEFUROXIME AXETIL 250 MG
1 TABLET ORAL
Qty: 20 | Refills: 0
Start: 2018-09-24 | End: 2018-10-03

## 2018-09-24 RX ADMIN — OXYCODONE HYDROCHLORIDE 5 MILLIGRAM(S): 5 TABLET ORAL at 05:54

## 2018-09-24 RX ADMIN — CEFEPIME 100 MILLIGRAM(S): 1 INJECTION, POWDER, FOR SOLUTION INTRAMUSCULAR; INTRAVENOUS at 05:53

## 2018-09-24 RX ADMIN — GABAPENTIN 300 MILLIGRAM(S): 400 CAPSULE ORAL at 05:53

## 2018-09-24 RX ADMIN — Medication 1 DROP(S): at 05:53

## 2018-09-24 RX ADMIN — Medication 667 MILLIGRAM(S): at 12:18

## 2018-09-24 RX ADMIN — Medication 200 MILLIGRAM(S): at 05:53

## 2018-09-24 RX ADMIN — Medication 81 MILLIGRAM(S): at 12:18

## 2018-09-24 RX ADMIN — DULOXETINE HYDROCHLORIDE 20 MILLIGRAM(S): 30 CAPSULE, DELAYED RELEASE ORAL at 05:53

## 2018-09-24 RX ADMIN — Medication 20 MILLIGRAM(S): at 05:53

## 2018-09-24 RX ADMIN — OXYCODONE HYDROCHLORIDE 5 MILLIGRAM(S): 5 TABLET ORAL at 12:18

## 2018-09-24 NOTE — PROGRESS NOTE ADULT - SUBJECTIVE AND OBJECTIVE BOX
Date of service: 18 @ 10:29    Weak looking  Fever is down  Denies pain; sleepy    ROS: no fever or chills; denies dizziness, no HA, no SOB or cough, no abdominal pain, no diarrhea or constipation; no dysuria, no legs pain, no rashes    MEDICATIONS  (STANDING):  acetaminophen   Tablet .. 650 milliGRAM(s) Oral every 8 hours  aspirin enteric coated 81 milliGRAM(s) Oral daily  atorvastatin 40 milliGRAM(s) Oral at bedtime  baclofen 20 milliGRAM(s) Oral two times a day  calcium acetate 667 milliGRAM(s) Oral three times a day with meals  cefepime   IVPB 2000 milliGRAM(s) IV Intermittent every 12 hours  docusate sodium 100 milliGRAM(s) Oral three times a day  DULoxetine 20 milliGRAM(s) Oral two times a day  gabapentin 300 milliGRAM(s) Oral three times a day  influenza   Vaccine 0.5 milliLiter(s) IntraMuscular once  labetalol 100 milliGRAM(s) Oral every 8 hours  prednisoLONE acetate 1% Suspension 1 Drop(s) Left EYE four times a day      Vital Signs Last 24 Hrs  T(C): 37.1 (22 Sep 2018 10:20), Max: 37.2 (22 Sep 2018 04:53)  T(F): 98.8 (22 Sep 2018 10:20), Max: 98.9 (22 Sep 2018 04:53)  HR: 87 (22 Sep 2018 10:20) (86 - 97)  BP: 137/78 (22 Sep 2018 10:20) (115/63 - 142/80)  BP(mean): --  RR: 18 (22 Sep 2018 10:20) (18 - 19)  SpO2: 98% (22 Sep 2018 10:20) (96% - 98%)    Physical Exam:      Constitutional: frail looking  HEENT: NC/AT, EOMI, PERRLA, conjunctivae clear  Neck: supple; thyroid not palpable  Back: no tenderness  Respiratory: respiratory effort normal; clear to auscultation  Cardiovascular: S1S2 regular, no murmurs  Abdomen: soft, not tender, not distended, positive BS  Genitourinary: no suprapubic tenderness  Lymphatic: no LN palpable  Musculoskeletal: no muscle tenderness, no joint swelling or tenderness  Extremities: no pedal edema  Neurological/ Psychiatric:  moving all extremities  Skin: no rashes; no palpable lesions    Labs: reviewed                        9.0    12.94 )-----------( 120      ( 22 Sep 2018 05:55 )             28.3     09-    141  |  113<H>  |  14  ----------------------------<  100<H>  3.8   |  22  |  0.30<L>    Ca    8.3<L>      22 Sep 2018 05:55  Phos  1.6     -  Mg     2.0         TPro  5.6<L>  /  Alb  2.5<L>  /  TBili  0.4  /  DBili  x   /  AST  23  /  ALT  22  /  AlkPhos  153<H>                          9.0    15.63 )-----------( 119      ( 21 Sep 2018 05:21 )             28.8     -    140  |  111<H>  |  21  ----------------------------<  138<H>  3.4<L>   |  23  |  0.38<L>    Ca    8.2<L>      21 Sep 2018 05:21  Phos  1.6       Mg     2.0         TPro  5.6<L>  /  Alb  2.5<L>  /  TBili  0.4  /  DBili  x   /  AST  29  /  ALT  20  /  AlkPhos  144<H>       LIVER FUNCTIONS - ( 21 Sep 2018 05:21 )  Alb: 2.5 g/dL / Pro: 5.6 gm/dL / ALK PHOS: 144 U/L / ALT: 20 U/L / AST: 29 U/L / GGT: x           Urinalysis Basic - ( 20 Sep 2018 10:25 )    Color: Yellow / Appearance: Slightly Turbid / S.015 / pH: x  Gluc: x / Ketone: Small  / Bili: Negative / Urobili: 1 mg/dL   Blood: x / Protein: 100 mg/dL / Nitrite: Positive   Leuk Esterase: Moderate / RBC: 6-10 /HPF / WBC >50   Sq Epi: x / Non Sq Epi: Few / Bacteria: Many      Culture - Urine (18 @ 10:25)    Specimen Source: .Urine Clean Catch (Midstream)    Culture Results:   Culture grew 3 or more types of organisms which indicate  collection contamination; consider recollection only if clinically  indicated.    Culture - Blood (18 @ 10:23)    Gram Stain:   Growth in aerobic bottle: Gram Negative Rods  Growth in anaerobic bottle: Gram Negative Rods    -  Escherichia coli: Detec    -  Klebsiella pneumoniae: Detec    Specimen Source: .Blood Blood-Peripheral    Organism: Blood Culture PCR    Culture Results:   Growth in aerobic bottle: Gram Negative Rods  Growth in anaerobic bottle: Gram Negative Rods        Radiology: all available radiological tests reviewed    < from: US Kidney and Bladder (18 @ 10:50) >  EXAM:  US KIDNEYS AND BLADDER                            PROCEDURE DATE:  2018          INTERPRETATION:  Ultrasound of the kidneys/bladder         CLINICAL INFORMATION:   History of aortic dissection. UTI, rule out   obstruction.     TECHNIQUE:  Transabdominal sonography was performed.    COMPARISON: Abdominal ultrasound dated 2016 and CT angiogram of the   chest dated 2018 are available for review.    FINDINGS:       The right kidney measures 11.1 cm in length. Its contours are smooth.   There is redemonstration of an hyperechoic 0.9 x 0.6 cm lesion within the   superior pole right kidney consistent with angiomyolipoma, as visualized   on prior ultrasound.  No calculi are seen.  No hydronephrosis is present.     The left kidney measures 11.3 cm in length. Its contours are smooth.   There is a simple mid-lower pole 2.9 x 1.1 cm cyst. No focal lesion is   found.  No calculi are seen.  No hydronephrosis is present.    The bladder demonstrated small amount of debris. Left ureteral jet   demonstrates ureteral patency.  No bladder mass or calculus is   recognized.  No wall thickening is recognized.    No free fluid is found in the pelvis.      The visualized abdominal aorta redemonstrates an extensive abdominal   aortic dissection as visualized on prior abdominal ultrasound of   2016. The abdominal aorta measures 3.8 cm in greatest anterior to   posterior dimension.  The IVC and retroperitoneal structures appear   intact.    Miscellaneous: Incidentally noted within the peripheral right hepatic   lobe is a 0.7 cm focal hyperechoic lesion as visualized on prior   ultrasound likely representing hemangioma. Within the posterior right   hepatic lobe there is a 0.5 cm hypoechoic simple cyst. The spleen is top   normal in size.    IMPRESSION:        No obstructive uropathy, hydronephrosis, or renal calculus.    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    Small amount of debris within the bladder, which is otherwise   unremarkable.    Redemonstration of hyperechoic 0.7 cm lesion within the right hepatic   lobe likely representing hemangioma.     Redemonstration of abdominal aortic dissection and aneurysm measuring up   to 3.8 cm.        Advanced directives addressed: full resuscitation
Date of service: 18 @ 12:32    Lying in bed in nAD  Has pain "all over"    ROS: no fever or chills; denies dizziness, no HA, no SOB or cough, no abdominal pain, no diarrhea or constipation; no dysuria, no rashes    MEDICATIONS  (STANDING):  acetaminophen   Tablet .. 650 milliGRAM(s) Oral every 8 hours  aspirin enteric coated 81 milliGRAM(s) Oral daily  atorvastatin 40 milliGRAM(s) Oral at bedtime  baclofen 20 milliGRAM(s) Oral two times a day  calcium acetate 667 milliGRAM(s) Oral three times a day with meals  cefepime   IVPB 2000 milliGRAM(s) IV Intermittent every 12 hours  docusate sodium 100 milliGRAM(s) Oral three times a day  DULoxetine 20 milliGRAM(s) Oral two times a day  gabapentin 300 milliGRAM(s) Oral three times a day  influenza   Vaccine 0.5 milliLiter(s) IntraMuscular once  labetalol 200 milliGRAM(s) Oral every 8 hours  prednisoLONE acetate 1% Suspension 1 Drop(s) Left EYE four times a day      Vital Signs Last 24 Hrs  T(C): 36.8 (24 Sep 2018 10:13), Max: 37.3 (23 Sep 2018 21:20)  T(F): 98.3 (24 Sep 2018 10:13), Max: 99.1 (23 Sep 2018 21:20)  HR: 88 (24 Sep 2018 10:13) (70 - 92)  BP: 129/59 (24 Sep 2018 10:13) (129/59 - 161/77)  BP(mean): --  RR: 18 (24 Sep 2018 10:13) (17 - 18)  SpO2: 97% (24 Sep 2018 10:13) (97% - 99%)    Physical Exam:      Constitutional: frail looking  HEENT: NC/AT, EOMI, PERRLA, conjunctivae clear  Neck: supple; thyroid not palpable  Back: no tenderness  Respiratory: respiratory effort normal; decreased BS at bases  Cardiovascular: S1S2 regular, no murmurs  Abdomen: soft, not tender, not distended, positive BS  Genitourinary: no suprapubic tenderness  Lymphatic: no LN palpable  Musculoskeletal: no muscle tenderness, no joint swelling or tenderness  Extremities: no pedal edema  Neurological/ Psychiatric:  moving all extremities  Skin: no rashes; no palpable lesions    Labs: reviewed                        10.4   10.87 )-----------( 181      ( 24 Sep 2018 06:35 )             32.1         140  |  108  |  7   ----------------------------<  113<H>  3.5   |  26  |  0.30<L>    Ca    8.4<L>      24 Sep 2018 06:35  Phos  2.4         TPro  6.0  /  Alb  2.8<L>  /  TBili  0.6  /  DBili  x   /  AST  17  /  ALT  19  /  AlkPhos  153<H>                            9.0    15.63 )-----------( 119      ( 21 Sep 2018 05:21 )             28.8         140  |  111<H>  |  21  ----------------------------<  138<H>  3.4<L>   |  23  |  0.38<L>    Ca    8.2<L>      21 Sep 2018 05:21  Phos  1.6       Mg     2.0         TPro  5.6<L>  /  Alb  2.5<L>  /  TBili  0.4  /  DBili  x   /  AST  29  /  ALT  20  /  AlkPhos  144<H>       LIVER FUNCTIONS - ( 21 Sep 2018 05:21 )  Alb: 2.5 g/dL / Pro: 5.6 gm/dL / ALK PHOS: 144 U/L / ALT: 20 U/L / AST: 29 U/L / GGT: x           Urinalysis Basic - ( 20 Sep 2018 10:25 )      Culture - Blood (collected 22 Sep 2018 05:55)  Source: .Blood None  Preliminary Report (23 Sep 2018 13:00):    No growth to date.    Culture - Blood (collected 22 Sep 2018 05:49)  Source: .Blood None  Preliminary Report (23 Sep 2018 13:00):    No growth to date.    Culture - Urine (collected 20 Sep 2018 10:25)  Source: .Urine Clean Catch (Midstream)  Final Report (21 Sep 2018 11:23):    Culture grew 3 or more types of organisms which indicate    collection contamination; consider recollection only if clinically    indicated.    Culture - Blood (collected 20 Sep 2018 10:23)  Source: .Blood Blood-Peripheral  Gram Stain (21 Sep 2018 02:37):    Growth in aerobic bottle: Gram Negative Rods    Growth in anaerobic bottle: Gram Negative Rods  Preliminary Report (22 Sep 2018 22:01):    Growth in aerobic and anaerobic bottles: Escherichia coli    Growth in aerobic bottle: Klebsiella pneumoniae  Organism: Blood Culture PCR  Escherichia coli (22 Sep 2018 22:01)  Organism: Escherichia coli (22 Sep 2018 22:01)      -  Amikacin: S <=8      -  Ampicillin: S <=2 These ampicillin results predict results for amoxicillin      -  Ampicillin/Sulbactam: S <=4/2      -  Aztreonam: S <=4      -  Cefazolin: S <=2      -  Cefepime: S <=2      -  Cefoxitin: S <=4      -  Ceftriaxone: S <=1 Enterobacter, Citrobacter, and Serratia may develop resistance during prolonged therapy      -  Ciprofloxacin: S <=0.5      -  Ertapenem: S <=0.5      -  Gentamicin: S <=1      -  Imipenem: S <=1      -  Levofloxacin: S <=1      -  Meropenem: S <=1      -  Piperacillin/Tazobactam: S <=8      -  Tobramycin: S <=2      -  Trimethoprim/Sulfamethoxazole: S <=0.5/9.5      Method Type: ASHLEY  Organism: Blood Culture PCR (21 Sep 2018 03:53)      -  Escherichia coli: Detec      -  Klebsiella pneumoniae: Detec      Method Type: PCR    Culture - Blood (collected 20 Sep 2018 10:23)  Source: .Blood Blood-Peripheral  Gram Stain (21 Sep 2018 18:01):    Growth in aerobic bottle: Gram Negative Rods    Growth in anaerobic bottle: Gram Negative Rods  Final Report (23 Sep 2018 09:32):    Growth in aerobic and anaerobic bottles: Escherichia coli    See previous culture [id number]    Color: Yellow / Appearance: Slightly Turbid / S.015 / pH: x  Gluc: x / Ketone: Small  / Bili: Negative / Urobili: 1 mg/dL   Blood: x / Protein: 100 mg/dL / Nitrite: Positive   Leuk Esterase: Moderate / RBC: 6-10 /HPF / WBC >50   Sq Epi: x / Non Sq Epi: Few / Bacteria: Many            Radiology: all available radiological tests reviewed    < from: US Kidney and Bladder (18 @ 10:50) >  EXAM:  US KIDNEYS AND BLADDER                            PROCEDURE DATE:  2018          INTERPRETATION:  Ultrasound of the kidneys/bladder         CLINICAL INFORMATION:   History of aortic dissection. UTI, rule out   obstruction.     TECHNIQUE:  Transabdominal sonography was performed.    COMPARISON: Abdominal ultrasound dated 2016 and CT angiogram of the   chest dated 2018 are available for review.    IMPRESSION:        No obstructive uropathy, hydronephrosis, or renal calculus.    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    Small amount of debris within the bladder, which is otherwise   unremarkable.    Redemonstration of hyperechoic 0.7 cm lesion within the right hepatic   lobe likely representing hemangioma.     Redemonstration of abdominal aortic dissection and aneurysm measuring up   to 3.8 cm.        Advanced directives addressed: full resuscitation
Date of service: 18 @ 13:05    Lying in bed in NAD  More alert and comfortable  Fever is down    ROS: no fever or chills; denies dizziness, no HA, no SOB or cough, no abdominal pain, no diarrhea or constipation; no legs pain, no rashes    MEDICATIONS  (STANDING):  acetaminophen   Tablet .. 650 milliGRAM(s) Oral every 8 hours  aspirin enteric coated 81 milliGRAM(s) Oral daily  atorvastatin 40 milliGRAM(s) Oral at bedtime  baclofen 20 milliGRAM(s) Oral two times a day  calcium acetate 667 milliGRAM(s) Oral three times a day with meals  calcium acetate 667 milliGRAM(s) Oral three times a day with meals  cefepime   IVPB 2000 milliGRAM(s) IV Intermittent every 12 hours  docusate sodium 100 milliGRAM(s) Oral three times a day  DULoxetine 20 milliGRAM(s) Oral two times a day  gabapentin 300 milliGRAM(s) Oral three times a day  influenza   Vaccine 0.5 milliLiter(s) IntraMuscular once  labetalol 100 milliGRAM(s) Oral every 8 hours  prednisoLONE acetate 1% Suspension 1 Drop(s) Left EYE four times a day      Vital Signs Last 24 Hrs  T(C): 36.9 (23 Sep 2018 09:35), Max: 36.9 (23 Sep 2018 09:35)  T(F): 98.4 (23 Sep 2018 09:35), Max: 98.4 (23 Sep 2018 09:35)  HR: 80 (23 Sep 2018 09:35) (80 - 97)  BP: 167/74 (23 Sep 2018 09:35) (137/69 - 167/74)  BP(mean): --  RR: 18 (23 Sep 2018 09:35) (16 - 18)  SpO2: 99% (23 Sep 2018 09:35) (97% - 99%)    Physical Exam:      Constitutional: frail looking  HEENT: NC/AT, EOMI, PERRLA, conjunctivae clear  Neck: supple; thyroid not palpable  Back: no tenderness  Respiratory: respiratory effort normal; clear to auscultation  Cardiovascular: S1S2 regular, no murmurs  Abdomen: soft, not tender, not distended, positive BS  Genitourinary: no suprapubic tenderness  Lymphatic: no LN palpable  Musculoskeletal: no muscle tenderness, no joint swelling or tenderness  Extremities: no pedal edema  Neurological/ Psychiatric:  moving all extremities  Skin: no rashes; no palpable lesions    Labs: reviewed                        9.8    10.29 )-----------( 133      ( 23 Sep 2018 07:00 )             30.6         139  |  108  |  8   ----------------------------<  112<H>  3.5   |  23  |  0.28<L>    Ca    8.7      23 Sep 2018 07:00  Phos  1.4         TPro  6.0  /  Alb  2.8<L>  /  TBili  0.6  /  DBili  x   /  AST  17  /  ALT  19  /  AlkPhos  153<H>                          9.0    15.63 )-----------( 119      ( 21 Sep 2018 05:21 )             28.8     09    140  |  111<H>  |  21  ----------------------------<  138<H>  3.4<L>   |  23  |  0.38<L>    Ca    8.2<L>      21 Sep 2018 05:21  Phos  1.6       Mg     2.0         TPro  5.6<L>  /  Alb  2.5<L>  /  TBili  0.4  /  DBili  x   /  AST  29  /  ALT  20  /  AlkPhos  144<H>       LIVER FUNCTIONS - ( 21 Sep 2018 05:21 )  Alb: 2.5 g/dL / Pro: 5.6 gm/dL / ALK PHOS: 144 U/L / ALT: 20 U/L / AST: 29 U/L / GGT: x           Urinalysis Basic - ( 20 Sep 2018 10:25 )      Culture - Blood (collected 22 Sep 2018 05:55)  Source: .Blood None  Preliminary Report (23 Sep 2018 13:00):    No growth to date.    Culture - Blood (collected 22 Sep 2018 05:49)  Source: .Blood None  Preliminary Report (23 Sep 2018 13:00):    No growth to date.    Culture - Urine (collected 20 Sep 2018 10:25)  Source: .Urine Clean Catch (Midstream)  Final Report (21 Sep 2018 11:23):    Culture grew 3 or more types of organisms which indicate    collection contamination; consider recollection only if clinically    indicated.    Culture - Blood (collected 20 Sep 2018 10:23)  Source: .Blood Blood-Peripheral  Gram Stain (21 Sep 2018 02:37):    Growth in aerobic bottle: Gram Negative Rods    Growth in anaerobic bottle: Gram Negative Rods  Preliminary Report (22 Sep 2018 22:01):    Growth in aerobic and anaerobic bottles: Escherichia coli    Growth in aerobic bottle: Klebsiella pneumoniae  Organism: Blood Culture PCR  Escherichia coli (22 Sep 2018 22:01)  Organism: Escherichia coli (22 Sep 2018 22:01)      -  Amikacin: S <=8      -  Ampicillin: S <=2 These ampicillin results predict results for amoxicillin      -  Ampicillin/Sulbactam: S <=4/2      -  Aztreonam: S <=4      -  Cefazolin: S <=2      -  Cefepime: S <=2      -  Cefoxitin: S <=4      -  Ceftriaxone: S <=1 Enterobacter, Citrobacter, and Serratia may develop resistance during prolonged therapy      -  Ciprofloxacin: S <=0.5      -  Ertapenem: S <=0.5      -  Gentamicin: S <=1      -  Imipenem: S <=1      -  Levofloxacin: S <=1      -  Meropenem: S <=1      -  Piperacillin/Tazobactam: S <=8      -  Tobramycin: S <=2      -  Trimethoprim/Sulfamethoxazole: S <=0.5/9.5      Method Type: ASHLEY  Organism: Blood Culture PCR (21 Sep 2018 03:53)      -  Escherichia coli: Detec      -  Klebsiella pneumoniae: Detec      Method Type: PCR    Culture - Blood (collected 20 Sep 2018 10:23)  Source: .Blood Blood-Peripheral  Gram Stain (21 Sep 2018 18:01):    Growth in aerobic bottle: Gram Negative Rods    Growth in anaerobic bottle: Gram Negative Rods  Final Report (23 Sep 2018 09:32):    Growth in aerobic and anaerobic bottles: Escherichia coli    See previous culture [id number]    Color: Yellow / Appearance: Slightly Turbid / S.015 / pH: x  Gluc: x / Ketone: Small  / Bili: Negative / Urobili: 1 mg/dL   Blood: x / Protein: 100 mg/dL / Nitrite: Positive   Leuk Esterase: Moderate / RBC: 6-10 /HPF / WBC >50   Sq Epi: x / Non Sq Epi: Few / Bacteria: Many            Radiology: all available radiological tests reviewed    < from: US Kidney and Bladder (18 @ 10:50) >  EXAM:  US KIDNEYS AND BLADDER                            PROCEDURE DATE:  2018          INTERPRETATION:  Ultrasound of the kidneys/bladder         CLINICAL INFORMATION:   History of aortic dissection. UTI, rule out   obstruction.     TECHNIQUE:  Transabdominal sonography was performed.    COMPARISON: Abdominal ultrasound dated 2016 and CT angiogram of the   chest dated 2018 are available for review.    FINDINGS:       The right kidney measures 11.1 cm in length. Its contours are smooth.   There is redemonstration of an hyperechoic 0.9 x 0.6 cm lesion within the   superior pole right kidney consistent with angiomyolipoma, as visualized   on prior ultrasound.  No calculi are seen.  No hydronephrosis is present.     The left kidney measures 11.3 cm in length. Its contours are smooth.   There is a simple mid-lower pole 2.9 x 1.1 cm cyst. No focal lesion is   found.  No calculi are seen.  No hydronephrosis is present.    The bladder demonstrated small amount of debris. Left ureteral jet   demonstrates ureteral patency.  No bladder mass or calculus is   recognized.  No wall thickening is recognized.    No free fluid is found in the pelvis.      The visualized abdominal aorta redemonstrates an extensive abdominal   aortic dissection as visualized on prior abdominal ultrasound of   2016. The abdominal aorta measures 3.8 cm in greatest anterior to   posterior dimension.  The IVC and retroperitoneal structures appear   intact.    Miscellaneous: Incidentally noted within the peripheral right hepatic   lobe is a 0.7 cm focal hyperechoic lesion as visualized on prior   ultrasound likely representing hemangioma. Within the posterior right   hepatic lobe there is a 0.5 cm hypoechoic simple cyst. The spleen is top   normal in size.    IMPRESSION:        No obstructive uropathy, hydronephrosis, or renal calculus.    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    Small amount of debris within the bladder, which is otherwise   unremarkable.    Redemonstration of hyperechoic 0.7 cm lesion within the right hepatic   lobe likely representing hemangioma.     Redemonstration of abdominal aortic dissection and aneurysm measuring up   to 3.8 cm.        Advanced directives addressed: full resuscitation
HOSPITALIST ATTENDING PROGRESS NOTE    Chart and meds reviewed.  Patient seen and examined.    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 Patient lethargic, poor historian, but denies chest pain, reports back pain, denies abdominal pain, denies cough, headaches. As per  at bedside Tyler , patient has low po intake for last couples of days.    9/21/18 pt seen and examined, aao x 3, comfortable, feels much better. has a hx of paraplegia due to spontaneous spinal hemorrhage in 1990's. Has had urinary and bowel retention due to this. Has to self cath for urine and self dis-impact. GI note, cardio and ID not reviewed. TSH low but free T4 is normal.     9/22/18 pt seen and examined, she is tired as she didn't sleep well. GI note appreciated. Patient says she has chronic Fe deficiency.       All 10 systems reviewed and found to be negative with the exception of what has been described above.    MEDICATIONS  (STANDING):  acetaminophen   Tablet .. 650 milliGRAM(s) Oral every 8 hours  aspirin enteric coated 81 milliGRAM(s) Oral daily  atorvastatin 40 milliGRAM(s) Oral at bedtime  baclofen 20 milliGRAM(s) Oral two times a day  calcium acetate 667 milliGRAM(s) Oral three times a day with meals  cefepime   IVPB 2000 milliGRAM(s) IV Intermittent every 12 hours  docusate sodium 100 milliGRAM(s) Oral three times a day  DULoxetine 20 milliGRAM(s) Oral two times a day  gabapentin 300 milliGRAM(s) Oral three times a day  influenza   Vaccine 0.5 milliLiter(s) IntraMuscular once  labetalol 100 milliGRAM(s) Oral every 8 hours  prednisoLONE acetate 1% Suspension 1 Drop(s) Left EYE four times a day    MEDICATIONS  (PRN):  aluminum hydroxide/magnesium hydroxide/simethicone Suspension 30 milliLiter(s) Oral every 4 hours PRN Dyspepsia  magnesium hydroxide Suspension 30 milliLiter(s) Oral two times a day PRN Constipation  ondansetron Injectable 4 milliGRAM(s) IV Push every 6 hours PRN Nausea  oxyCODONE    IR 5 milliGRAM(s) Oral every 4 hours PRN Moderate Pain (4 - 6)  senna 2 Tablet(s) Oral at bedtime PRN Constipation      VITALS:  T(F): 98.8 (09-22-18 @ 10:20), Max: 98.9 (09-22-18 @ 04:53)  HR: 87 (09-22-18 @ 10:20) (86 - 97)  BP: 137/78 (09-22-18 @ 10:20) (115/63 - 142/80)  RR: 18 (09-22-18 @ 10:20) (18 - 19)  SpO2: 98% (09-22-18 @ 10:20) (96% - 98%)  Wt(kg): --    I&O's Summary    21 Sep 2018 07:01  -  22 Sep 2018 07:00  --------------------------------------------------------  IN: 660 mL / OUT: 825 mL / NET: -165 mL        CAPILLARY BLOOD GLUCOSE          PHYSICAL EXAM:    HEENT:  pupils equal and reactive, EOMI, no oropharyngeal lesions, erythema, exudates, oral thrush    NECK:   supple, no carotid bruits, no palpable lymph nodes, no thyromegaly    CV:  +S1, +S2, regular, no murmurs or rubs    RESP:   lungs clear to auscultation bilaterally, no wheezing, rales, rhonchi, good air entry bilaterally    BREAST:  not examined    GI:  abdomen soft, non-tender, non-distended, normal BS, no bruits, no abdominal masses, no palpable masses    RECTAL:  not examined    :  not examined    MSK:   normal muscle tone, no atrophy, no rigidity, no contractions    EXT:   no clubbing, no cyanosis, no edema, no calf pain, swelling or erythema    VASCULAR:  pulses equal and symmetric in the upper and lower extremities    NEURO:  AAOX3, no focal neurological deficits, follows all commands, able to move extremities spontaneously    SKIN:  no ulcers, lesions or rashes    LABS:                            9.0    12.94 )-----------( 120      ( 22 Sep 2018 05:55 )             28.3     09-22    141  |  113<H>  |  14  ----------------------------<  100<H>  3.8   |  22  |  0.30<L>    Ca    8.3<L>      22 Sep 2018 05:55  Phos  1.6     09-21  Mg     2.0     09-21    TPro  5.6<L>  /  Alb  2.5<L>  /  TBili  0.4  /  DBili  x   /  AST  23  /  ALT  22  /  AlkPhos  153<H>  09-22    CARDIAC MARKERS ( 21 Sep 2018 01:04 )  0.047 ng/mL / x     / 264 U/L / x     / x      CARDIAC MARKERS ( 20 Sep 2018 19:22 )  0.062 ng/mL / x     / 275 U/L / x     / x          LIVER FUNCTIONS - ( 22 Sep 2018 05:55 )  Alb: 2.5 g/dL / Pro: 5.6 gm/dL / ALK PHOS: 153 U/L / ALT: 22 U/L / AST: 23 U/L / GGT: x                                             CULTURES:
HOSPITALIST ATTENDING PROGRESS NOTE    Chart and meds reviewed.  Patient seen and examined.    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 Patient lethargic, poor historian, but denies chest pain, reports back pain, denies abdominal pain, denies cough, headaches. As per  at bedside Tyler , patient has low po intake for last couples of days.    9/21/18 pt seen and examined, aao x 3, comfortable, feels much better. has a hx of paraplegia due to spontaneous spinal hemorrhage in 1990's. Has had urinary and bowel retention due to this. Has to self cath for urine and self dis-impact. GI note, cardio and ID not reviewed. TSH low but free T4 is normal.     9/22/18 pt seen and examined, she is tired as she didn't sleep well. GI note appreciated. Patient says she has chronic Fe deficiency.     9/23/18 pt seen and examined, rested well last night, on air mattress, afebrile, wbc normalized. repeat blood Cx negative.     All 10 systems reviewed and found to be negative with the exception of what has been described above.    MEDICATIONS  (STANDING):  acetaminophen   Tablet .. 650 milliGRAM(s) Oral every 8 hours  aspirin enteric coated 81 milliGRAM(s) Oral daily  atorvastatin 40 milliGRAM(s) Oral at bedtime  baclofen 20 milliGRAM(s) Oral two times a day  calcium acetate 667 milliGRAM(s) Oral three times a day with meals  calcium acetate 667 milliGRAM(s) Oral three times a day with meals  cefepime   IVPB 2000 milliGRAM(s) IV Intermittent every 12 hours  docusate sodium 100 milliGRAM(s) Oral three times a day  DULoxetine 20 milliGRAM(s) Oral two times a day  gabapentin 300 milliGRAM(s) Oral three times a day  influenza   Vaccine 0.5 milliLiter(s) IntraMuscular once  labetalol 100 milliGRAM(s) Oral every 8 hours  prednisoLONE acetate 1% Suspension 1 Drop(s) Left EYE four times a day    MEDICATIONS  (PRN):  aluminum hydroxide/magnesium hydroxide/simethicone Suspension 30 milliLiter(s) Oral every 4 hours PRN Dyspepsia  magnesium hydroxide Suspension 30 milliLiter(s) Oral two times a day PRN Constipation  ondansetron Injectable 4 milliGRAM(s) IV Push every 6 hours PRN Nausea  oxyCODONE    IR 5 milliGRAM(s) Oral every 4 hours PRN Moderate Pain (4 - 6)  senna 2 Tablet(s) Oral at bedtime PRN Constipation  zolpidem 5 milliGRAM(s) Oral at bedtime PRN Insomnia      VITALS:  T(F): 98.4 (09-23-18 @ 09:35), Max: 98.4 (09-23-18 @ 09:35)  HR: 91 (09-23-18 @ 13:09) (80 - 97)  BP: 156/68 (09-23-18 @ 13:09) (137/69 - 167/74)  RR: 18 (09-23-18 @ 13:09) (16 - 18)  SpO2: 99% (09-23-18 @ 13:09) (97% - 99%)  Wt(kg): --    I&O's Summary    22 Sep 2018 07:01  -  23 Sep 2018 07:00  --------------------------------------------------------  IN: 0 mL / OUT: 1700 mL / NET: -1700 mL        CAPILLARY BLOOD GLUCOSE          PHYSICAL EXAM:    HEENT:  pupils equal and reactive, EOMI, no oropharyngeal lesions, erythema, exudates, oral thrush    NECK:   supple, no carotid bruits, no palpable lymph nodes, no thyromegaly    CV:  +S1, +S2, regular, no murmurs or rubs    RESP:   lungs clear to auscultation bilaterally, no wheezing, rales, rhonchi, good air entry bilaterally    BREAST:  not examined    GI:  abdomen soft, non-tender, non-distended, normal BS, no bruits, no abdominal masses, no palpable masses    RECTAL:  not examined    :  not examined    MSK:   normal muscle tone, no atrophy, no rigidity, no contractions    EXT:   no clubbing, no cyanosis, no edema, no calf pain, swelling or erythema    VASCULAR:  pulses equal and symmetric in the upper and lower extremities    NEURO:  AAOX3, no focal neurological deficits, follows all commands, able to move extremities spontaneously    SKIN:  no ulcers, lesions or rashes    LABS:                            9.8    10.29 )-----------( 133      ( 23 Sep 2018 07:00 )             30.6     09-23    139  |  108  |  8   ----------------------------<  112<H>  3.5   |  23  |  0.28<L>    Ca    8.7      23 Sep 2018 07:00  Phos  1.4     09-22    TPro  6.0  /  Alb  2.8<L>  /  TBili  0.6  /  DBili  x   /  AST  17  /  ALT  19  /  AlkPhos  153<H>  09-23        LIVER FUNCTIONS - ( 23 Sep 2018 07:00 )  Alb: 2.8 g/dL / Pro: 6.0 gm/dL / ALK PHOS: 153 U/L / ALT: 19 U/L / AST: 17 U/L / GGT: x                                             CULTURES:
HOSPITALIST ATTENDING PROGRESS NOTE    Chart and meds reviewed.  Patient seen and examined.    HPI: 68 yo F with a PMH of HTN, HLD, h/o TIA,  Type A aortic thoracic dissection 2009 s/p repair, s/p descending aortic aneurysm repair 2016, spontaneous subdural spinal cord hemorrhage s/p drainage 2014 with 2 untethering of the spinal cord procedures, paraplegia now wheelchair bound , Left eye blindness s/p cornea transplant, h/o of multiple UTIs in the setting of self catheterization, empyema, Chronic back pain,  baclofen pump who presented to the ED with worsening of po intake for last 3 days, lethargy on and off, and now fevers. Patient lethargic, poor historian, but denies chest pain, reports back pain, denies abdominal pain, denies cough, headaches. As per  at bedside Tyler , patient has low po intake for last couples of days.    18 pt seen and examined, aao x 3, comfortable, feels much better. has a hx of paraplegia due to spontaneous spinal hemorrhage in . Has had urinary and bowel retention due to this. Has to self cath for urine and self dis-impact. GI note, cardio and ID not reviewed. TSH low but free T4 is normal.     All 10 systems reviewed and found to be negative with the exception of what has been described above.    MEDICATIONS  (STANDING):  acetaminophen   Tablet .. 650 milliGRAM(s) Oral every 8 hours  aspirin enteric coated 81 milliGRAM(s) Oral daily  atorvastatin 40 milliGRAM(s) Oral at bedtime  baclofen 20 milliGRAM(s) Oral two times a day  calcium acetate 667 milliGRAM(s) Oral three times a day with meals  cefepime   IVPB 2000 milliGRAM(s) IV Intermittent every 12 hours  docusate sodium 100 milliGRAM(s) Oral three times a day  DULoxetine 20 milliGRAM(s) Oral two times a day  gabapentin 300 milliGRAM(s) Oral three times a day  influenza   Vaccine 0.5 milliLiter(s) IntraMuscular once  labetalol 100 milliGRAM(s) Oral every 8 hours  potassium chloride    Tablet ER 20 milliEquivalent(s) Oral once  prednisoLONE acetate 1% Suspension 1 Drop(s) Left EYE four times a day    MEDICATIONS  (PRN):  aluminum hydroxide/magnesium hydroxide/simethicone Suspension 30 milliLiter(s) Oral every 4 hours PRN Dyspepsia  magnesium hydroxide Suspension 30 milliLiter(s) Oral two times a day PRN Constipation  ondansetron Injectable 4 milliGRAM(s) IV Push every 6 hours PRN Nausea  oxyCODONE    IR 5 milliGRAM(s) Oral every 4 hours PRN Moderate Pain (4 - 6)  senna 2 Tablet(s) Oral at bedtime PRN Constipation      VITALS:  T(F): 98.4 (18 @ 17:21), Max: 99.3 (18 @ 19:20)  HR: 97 (18 @ 17:21) (83 - 102)  BP: 139/71 (18 @ 17:21) (103/61 - 146/76)  RR: 18 (18 @ 17:21) (17 - 28)  SpO2: 96% (18 @ 17:21) (96% - 100%)  Wt(kg): --    I&O's Summary    20 Sep 2018 07:  -  21 Sep 2018 07:00  --------------------------------------------------------  IN: 1420 mL / OUT: 600 mL / NET: 820 mL    21 Sep 2018 07:  -  21 Sep 2018 17:41  --------------------------------------------------------  IN: 660 mL / OUT: 550 mL / NET: 110 mL        CAPILLARY BLOOD GLUCOSE          PHYSICAL EXAM:    HEENT:  pupils equal and reactive, EOMI, no oropharyngeal lesions, erythema, exudates, oral thrush    NECK:   supple, no carotid bruits, no palpable lymph nodes, no thyromegaly    CV:  +S1, +S2, regular, no murmurs or rubs    RESP:   lungs clear to auscultation bilaterally, no wheezing, rales, rhonchi, good air entry bilaterally    BREAST:  not examined    GI:  abdomen soft, non-tender, non-distended, normal BS, no bruits, no abdominal masses, no palpable masses    RECTAL:  not examined    :  not examined    MSK:   normal muscle tone, no atrophy, no rigidity, no contractions    EXT:   no clubbing, no cyanosis, no edema, no calf pain, swelling or erythema    VASCULAR:  pulses equal and symmetric in the upper and lower extremities    NEURO:  AAOX3, no focal neurological deficits, follows all commands, able to move extremities spontaneously    SKIN:  no ulcers, lesions or rashes    LABS:                            9.0    15.63 )-----------( 119      ( 21 Sep 2018 05:21 )             28.8     -    140  |  111<H>  |  21  ----------------------------<  138<H>  3.4<L>   |  23  |  0.38<L>    Ca    8.2<L>      21 Sep 2018 05:21  Phos  1.6       Mg     2.0         TPro  5.6<L>  /  Alb  2.5<L>  /  TBili  0.4  /  DBili  x   /  AST  29  /  ALT  20  /  AlkPhos  144<H>  -    CARDIAC MARKERS ( 21 Sep 2018 01:04 )  0.047 ng/mL / x     / 264 U/L / x     / x      CARDIAC MARKERS ( 20 Sep 2018 19:22 )  0.062 ng/mL / x     / 275 U/L / x     / x      CARDIAC MARKERS ( 20 Sep 2018 10:23 )  0.089 ng/mL / x     / x     / x     / x          LIVER FUNCTIONS - ( 21 Sep 2018 05:21 )  Alb: 2.5 g/dL / Pro: 5.6 gm/dL / ALK PHOS: 144 U/L / ALT: 20 U/L / AST: 29 U/L / GGT: x           PT/INR - ( 20 Sep 2018 10:23 )   PT: 13.8 sec;   INR: 1.27 ratio         PTT - ( 20 Sep 2018 10:23 )  PTT:32.8 sec  Urinalysis Basic - ( 20 Sep 2018 10:25 )    Color: Yellow / Appearance: Slightly Turbid / S.015 / pH: x  Gluc: x / Ketone: Small  / Bili: Negative / Urobili: 1 mg/dL   Blood: x / Protein: 100 mg/dL / Nitrite: Positive   Leuk Esterase: Moderate / RBC: 6-10 /HPF / WBC >50   Sq Epi: x / Non Sq Epi: Few / Bacteria: Many                                    CULTURES:
Patient is a 67y old  Female who presents with a chief complaint of lethargy, poor po intake, fever (21 Sep 2018 17:40)      HPI:  68 yo F with a PMH of HTN, HLD, h/o TIA,  Type A aortic thoracic dissection 5/2009 s/p repair, s/p descending aortic aneurysm repair 09/2016, spontaneous subdural spinal cord hemorrhage s/p drainage 08/2014 with 2 untethering of the spinal cord procedures, paraplegia now wheelchair bound , Left eye blindness s/p cornea transplant, h/o of multiple UTIs in the setting of self catheterization, empyema, Chronic back pain,  baclofen pump who presented to the ED with worsening of po intake for last 3 days, lethargy on and off, and now fevers. Patient lethargic, poor historian, but denies chest pain, reports back pain, denies abdominal pain, denies cough, headaches. As per  at bedside Tyler , patient has low po intake for last couples of days.    In Ed - T(F): 99.3 , Max: 104  HR: 96  (96 - 130) BP: 112/65 (96/62 - 114/86) RR: 23  (19 - 28) SpO2: 98%  (95% - 100%) WBC 12.7, Hb 9.9, Pl 130, INR 1.27 , lactate 2.6--> 1. 2, BUN 39, Cr 0.97, troponin 0.089,  S/P 2.5 L NS , s/p Vanco, cefepime, tylenol, pt  now more normotensive,  but still altered.  As per the patient  who is her primary caregiver this altered awareness is common when she has a UTI. (20 Sep 2018 16:15)    Patient denies feeling constipated. Does not want to take any supplements. Wishes to manage on her own.       PAST MEDICAL & SURGICAL HISTORY:  Dorsalgia of lumbar region  Self-catheterizes urinary bladder  Anemia  Uveitis  Osteoporosis  PAD (peripheral artery disease)  Hematoma: spinal  Paraplegia  Aortic dissection, abdominal: Type B Watched regularly  Aortic dissection, thoracic: Type A Repaired  Blindness of left eye  Chronic constipation  UTI (urinary tract infection)  TIA (transient ischemic attack)  HTN (Hypertension)  History of corneal transplant: left corneal transplant on 5/21/2018  Disorder of spine: detethering  Presence of IVC filter  S/P aortic dissection repair: Type A Dissection repair  H/O Spinal surgery: laminectomies      MEDICATIONS  (STANDING):  acetaminophen   Tablet .. 650 milliGRAM(s) Oral every 8 hours  aspirin enteric coated 81 milliGRAM(s) Oral daily  atorvastatin 40 milliGRAM(s) Oral at bedtime  baclofen 20 milliGRAM(s) Oral two times a day  calcium acetate 667 milliGRAM(s) Oral three times a day with meals  cefepime   IVPB 2000 milliGRAM(s) IV Intermittent every 12 hours  docusate sodium 100 milliGRAM(s) Oral three times a day  DULoxetine 20 milliGRAM(s) Oral two times a day  gabapentin 300 milliGRAM(s) Oral three times a day  influenza   Vaccine 0.5 milliLiter(s) IntraMuscular once  labetalol 100 milliGRAM(s) Oral every 8 hours  prednisoLONE acetate 1% Suspension 1 Drop(s) Left EYE four times a day    MEDICATIONS  (PRN):  aluminum hydroxide/magnesium hydroxide/simethicone Suspension 30 milliLiter(s) Oral every 4 hours PRN Dyspepsia  magnesium hydroxide Suspension 30 milliLiter(s) Oral two times a day PRN Constipation  ondansetron Injectable 4 milliGRAM(s) IV Push every 6 hours PRN Nausea  oxyCODONE    IR 5 milliGRAM(s) Oral every 4 hours PRN Moderate Pain (4 - 6)  senna 2 Tablet(s) Oral at bedtime PRN Constipation      Allergies    Fosamax (Unknown)    Intolerances        Vital Signs Last 24 Hrs  T(C): 37.2 (22 Sep 2018 04:53), Max: 37.2 (22 Sep 2018 04:53)  T(F): 98.9 (22 Sep 2018 04:53), Max: 98.9 (22 Sep 2018 04:53)  HR: 89 (22 Sep 2018 04:53) (86 - 97)  BP: 142/80 (22 Sep 2018 04:53) (103/61 - 142/80)  BP(mean): 71 (21 Sep 2018 09:01) (71 - 71)  RR: 18 (21 Sep 2018 20:37) (18 - 25)  SpO2: 97% (22 Sep 2018 04:53) (96% - 97%)    PHYSICAL EXAM:    Constitutional: chronically ill female in NAD  Respiratory: CTAB  Cardiovascular: S1 and S2, RRR, no M/G/R  Gastrointestinal: BS+, soft, NT/ND    LABS:                        9.0    12.94 )-----------( 120      ( 22 Sep 2018 05:55 )             28.3     09-22    141  |  113<H>  |  14  ----------------------------<  100<H>  3.8   |  22  |  0.30<L>    Ca    8.3<L>      22 Sep 2018 05:55  Phos  1.6     09-21  Mg     2.0     09-21    TPro  5.6<L>  /  Alb  2.5<L>  /  TBili  0.4  /  DBili  x   /  AST  23  /  ALT  22  /  AlkPhos  153<H>  09-22    PT/INR - ( 20 Sep 2018 10:23 )   PT: 13.8 sec;   INR: 1.27 ratio         PTT - ( 20 Sep 2018 10:23 )  PTT:32.8 sec  LIVER FUNCTIONS - ( 22 Sep 2018 05:55 )  Alb: 2.5 g/dL / Pro: 5.6 gm/dL / ALK PHOS: 153 U/L / ALT: 22 U/L / AST: 23 U/L / GGT: x             RADIOLOGY & ADDITIONAL STUDIES:

## 2018-09-24 NOTE — PHYSICAL THERAPY INITIAL EVALUATION ADULT - PLANNED THERAPY INTERVENTIONS, PT EVAL
stretching/transfer training/postural re-education/strengthening/balance training/bed mobility training

## 2018-09-24 NOTE — DISCHARGE NOTE ADULT - PATIENT PORTAL LINK FT
You can access the [facility], offered by [facility], by registering with the following website: [email or web address]

## 2018-09-24 NOTE — DISCHARGE NOTE ADULT - HOSPITAL COURSE
68 yo F with a PMH of HTN, HLD, h/o TIA,  Type A aortic thoracic dissection 5/2009 s/p repair, s/p descending aortic aneurysm repair 09/2016, spontaneous subdural spinal cord hemorrhage s/p drainage 08/2014 with 2 untethering of the spinal cord procedures, paraplegia now wheelchair bound , Left eye blindness s/p cornea transplant, h/o of multiple UTIs in the setting of self catheterization, empyema, Chronic back pain,  baclofen pump who presented to the ED with worsening of po intake for last 3 days, lethargy on and off, and now fevers. Patient lethargic, poor historian, but denies chest pain, reports back pain, denies abdominal pain, denies cough, headaches. As per  at bedside Tyler , patient has low po intake for last couples of days.    9/21/18 pt seen and examined, aao x 3, comfortable, feels much better. has a hx of paraplegia due to spontaneous spinal hemorrhage in 1990's. Has had urinary and bowel retention due to this. Has to self cath for urine and self dis-impact. GI note, cardio and ID not reviewed. TSH low but free T4 is normal.     9/22/18 pt seen and examined, she is tired as she didn't sleep well. GI note appreciated. Patient says she has chronic Fe deficiency.     9/23/18 pt seen and examined, rested well last night, on air mattress, afebrile, wbc normalized. repeat blood Cx negative.     9/24/18 pt seen and examined, feels well, sensitivities noted, discussed po ceftin. pt has home aides, home PT

## 2018-09-24 NOTE — PHYSICAL THERAPY INITIAL EVALUATION ADULT - MUSCLE TONE ASSESSMENT, REHAB EVAL
Multi-beat clonus noted in bilateral feet with a/aa/prom of bilateral LEs./spasticity/moderately increased tone

## 2018-09-24 NOTE — PHYSICAL THERAPY INITIAL EVALUATION ADULT - ADDITIONAL COMMENTS
Pt presents with flexed and contracted knees and hips, stating that she does not normally walk, her baseline is independent bed to wheel chair transfers, aides help with ADLS, hygiene and other transfers as needed. The pt reports that she regularly attends outpatient PT at [facility] where she finds it to beneficial, the pt states that she has had poor success with Home PT and does not want to consider HOME PT.

## 2018-09-24 NOTE — PHYSICAL THERAPY INITIAL EVALUATION ADULT - DIAGNOSIS, PT EVAL
67 y.o. female with hx of paraplegia due to spontaneous spinal hemorrhage in 1990's - presents with Severe sepsis due to UTI

## 2018-09-24 NOTE — PROGRESS NOTE ADULT - PROVIDER SPECIALTY LIST ADULT
Gastroenterology
Hospitalist
Infectious Disease

## 2018-09-24 NOTE — PHYSICAL THERAPY INITIAL EVALUATION ADULT - PERTINENT HX OF CURRENT PROBLEM, REHAB EVAL
66 yo F with a PMH of HTN, HLD, h/o TIA,  Type A aortic thoracic dissection 5/2009 s/p repair, s/p descending aortic aneurysm repair 09/2016, spontaneous subdural spinal cord hemorrhage s/p drainage 08/2014 with 2 untethering of the spinal cord procedures, paraplegia now wheelchair bound , Left eye blindness s/p cornea transplant, h/o of multiple UTIs in the setting of self catheterization, empyema, Chronic back pain,  baclofen pump who presented to the ED with lethargy, decreased PO intake

## 2018-09-24 NOTE — PHYSICAL THERAPY INITIAL EVALUATION ADULT - MODALITIES TREATMENT COMMENTS
The pt was assisted OOB to the w/c and was adjusted for comfort, the pt c/o persistent chronic, multifocal pain, RN aware, the pt eagerly was anticipated d/c home. The pt has aides present most if not all of the times as per her . The pt's cb, tray and phone were in place at end of tx.

## 2018-09-24 NOTE — DISCHARGE NOTE ADULT - CARE PROVIDER_API CALL
Basil Branham), Cardiovascular Disease; Internal Medicine  [id number]  [place] [address]  Phone: [phone]  Fax: [phone]

## 2018-09-24 NOTE — PROGRESS NOTE ADULT - ASSESSMENT
66 yo F with a PMH of HTN, HLD, h/o TIA,  Type A aortic thoracic dissection 5/2009 s/p repair, s/p descending aortic aneurysm repair 09/2016, spontaneous subdural spinal cord hemorrhage s/p drainage 08/2014 with 2 untethering of the spinal cord procedures, paraplegia now wheelchair bound , Left eye blindness s/p cornea transplant, h/o of multiple UTIs in the setting of self catheterization, empyema, Chronic back pain,  baclofen pump who presented to the ED 9/20 with worsening of po intake for last 3 days, lethargy on and off, and now fevers. Here pt noted with fever to 104, elevated wbc ct,positive ua and blood cx growing Ecoli/KLPN, pt has grown these organisms on prior cx, she was given IV cefepime.     1. Sepsis with Ecoli/KLPN sepsis/cystitis/urinary retention/paraplegia  -fever is down  - prior cx reviewed  - on cefepime [id number] # 2  -tolerating abx well so far; no side effects noted  - f/u sensitivities of cx  - repeat blood cx  - renal US reviewed, no stones or hydro  - monitor for retention, pt self-catheterizes  - f/u cbc  - tolerating abx well so far; no side effects noted  - continue abx coverage  - supportive care  - f/u cbc    2. other issues - care per medicine
66 yo F with a PMH of HTN, HLD, h/o TIA,  Type A aortic thoracic dissection 5/2009 s/p repair, s/p descending aortic aneurysm repair 09/2016, spontaneous subdural spinal cord hemorrhage s/p drainage 08/2014 with 2 untethering of the spinal cord procedures, paraplegia now wheelchair bound , Left eye blindness s/p cornea transplant, h/o of multiple UTIs in the setting of self catheterization, empyema, Chronic back pain,  baclofen pump who presented to the ED 9/20 with worsening of po intake for last 3 days, lethargy on and off, and now fevers. Here pt noted with fever to 104, elevated wbc ct,positive ua and blood cx growing Ecoli/KLPN, pt has grown these organisms on prior cx, she was given IV cefepime.     1. Sepsis with Ecoli/KLPN resolving. Cystitis. Urinary retention/paraplegia.  -improivng  -fever is resolving  -leukocytosis improving  - prior cx reviewed  - on cefepime [id number] # 4  -tolerating abx well so far; no side effects noted  - repeat blood cx are negative to date  - renal US reviewed, no stones or hydro  - monitor for retention, pt self-catheterizes  - f/u cbc  - tolerating abx well so far; no side effects noted  - change abx to ceftin 500 mg PO q12h for 10 more days  - supportive care  - f/u cbc    2. other issues - care per medicine
66 yo F with a PMH of HTN, HLD, h/o TIA,  Type A aortic thoracic dissection 5/2009 s/p repair, s/p descending aortic aneurysm repair 09/2016, spontaneous subdural spinal cord hemorrhage s/p drainage 08/2014 with 2 untethering of the spinal cord procedures, paraplegia now wheelchair bound , Left eye blindness s/p cornea transplant, h/o of multiple UTIs in the setting of self catheterization, empyema, Chronic back pain,  baclofen pump who presented to the ED with worsening of po intake for last 3 days, lethargy on and off, and now fevers. Patient lethargic, poor historian, but denies chest pain, reports back pain, denies abdominal pain, denies cough, headaches. As per  at bedside Tyler , patient has low po intake for last couples of days.    1. Severe sepsis due to UTI (patient self catheterizes herself due to paraplegia)  - encourage po hydration  - ID consult appreciated, continue cefepime day #3  - renal sono noted  - repeat blood cx are negative  - awaiting sensitivities on the positive blood cx    2. Elevated troponin likely demand  ischemic   - 2 d echo noted normal EF    3. Metabolic encephalopathy due to UTI: resolved    4. Anemia, Fe deficiency: GI note appreciated  - As per pt, she has seen [facility] and GI as outpt and wants to think about outpt follow up     5. Chronic back pain   - c/w oxycodone prn   - monitor    6. HTN: uncontrolled  - increase to home dose labetalol 200mg Q 8 hours    7. Dispo: likely after sensitivities to home with services        Advance directives  - HCP- Tyler  [phone]  - Full code
66 yo female with multiple issues including hemiplegia, requiring stimulation for bowel movements. Patient does not want to change her current regimen. Please call with questions.
68 yo F with a PMH of HTN, HLD, h/o TIA,  Type A aortic thoracic dissection 5/2009 s/p repair, s/p descending aortic aneurysm repair 09/2016, spontaneous subdural spinal cord hemorrhage s/p drainage 08/2014 with 2 untethering of the spinal cord procedures, paraplegia now wheelchair bound , Left eye blindness s/p cornea transplant, h/o of multiple UTIs in the setting of self catheterization, empyema, Chronic back pain,  baclofen pump who presented to the ED 9/20 with worsening of po intake for last 3 days, lethargy on and off, and now fevers. Here pt noted with fever to 104, elevated wbc ct,positive ua and blood cx growing Ecoli/KLPN, pt has grown these organisms on prior cx, she was given IV cefepime.     1. Sepsis with Ecoli/KLPN sepsis. Cystitis. Urinary retention/paraplegia  -fever is resolving  -leukocytosis improving  - prior cx reviewed  - on cefepime [id number] # 3  -tolerating abx well so far; no side effects noted  - f/u sensitivities of cx  - repeat blood cx are negative to date  - renal US reviewed, no stones or hydro  - monitor for retention, pt self-catheterizes  - f/u cbc  - tolerating abx well so far; no side effects noted  - continue abx coverage  - supportive care  - f/u cbc    2. other issues - care per medicine
68 yo F with a PMH of HTN, HLD, h/o TIA,  Type A aortic thoracic dissection 5/2009 s/p repair, s/p descending aortic aneurysm repair 09/2016, spontaneous subdural spinal cord hemorrhage s/p drainage 08/2014 with 2 untethering of the spinal cord procedures, paraplegia now wheelchair bound , Left eye blindness s/p cornea transplant, h/o of multiple UTIs in the setting of self catheterization, empyema, Chronic back pain,  baclofen pump who presented to the ED with worsening of po intake for last 3 days, lethargy on and off, and now fevers. Patient lethargic, poor historian, but denies chest pain, reports back pain, denies abdominal pain, denies cough, headaches. As per  at bedside Tyler , patient has low po intake for last couples of days.    1. Severe sepsis due to UTI   - DC IVF and encourage po hydration  - ID consult appreciated, continue cefepime day #2  - renal sono noted  - f/u cultures    2. Elevated troponin likely demand  ischemic   - 2 d echo note normal EF    3. Metabolic encephalopathy due to UTI: resolved    4. Anemia, Fe deficiency: GI note appreciated  - As per pt, she has seen [facility] and GI as outpt and wants to think about outpt follow up     5. Chronic back pain   - c/w oxycodone prn   - monitor    6. HTN: controlled    * Advanced directive   - HCP- Tyler  [phone]  - Full code
68 yo F with a PMH of HTN, HLD, h/o TIA,  Type A aortic thoracic dissection 5/2009 s/p repair, s/p descending aortic aneurysm repair 09/2016, spontaneous subdural spinal cord hemorrhage s/p drainage 08/2014 with 2 untethering of the spinal cord procedures, paraplegia now wheelchair bound , Left eye blindness s/p cornea transplant, h/o of multiple UTIs in the setting of self catheterization, empyema, Chronic back pain,  baclofen pump who presented to the ED with worsening of po intake for last 3 days, lethargy on and off, and now fevers. Patient lethargic, poor historian, but denies chest pain, reports back pain, denies abdominal pain, denies cough, headaches. As per  at bedside Tyler , patient has low po intake for last couples of days.    1. Severe sepsis due to UTI   - continue IVF and encourage po hydration  - ID consult appreciated, continue cefepime  - renal sono noted  - f/u cultures    2. Elevated troponin likely demand  ischemic   - 2 d echo    3. Metabolic encephalopathy due to UTI: resolved    4. Anemia, Fe deficiency: GI following    5. Chronic back pain   - c/w oxycodone prn   - monitor    6. HTN  - hold labetalol  - restart as soon BP better    * Advanced directive   - HCP- Tyler  [phone]  - Full code

## 2018-09-24 NOTE — DISCHARGE NOTE ADULT - CARE PLAN
Principal Discharge DX:	Severe sepsis  Goal:	no more infection  Assessment and plan of treatment:	avoid infection, betadyne use for self cath  Secondary Diagnosis:	UTI (urinary tract infection)  Goal:	get proper technique with self cath  Assessment and plan of treatment:	get proper technique with self cath

## 2018-09-24 NOTE — PHYSICAL THERAPY INITIAL EVALUATION ADULT - IMPAIRED TRANSFERS: BED/CHAIR, REHAB EVAL
decreased sensation/impaired sensory feedback/impaired postural control/impaired balance/pain/decreased strength

## 2018-09-24 NOTE — PHYSICAL THERAPY INITIAL EVALUATION ADULT - GENERAL OBSERVATIONS, REHAB EVAL
The pt was received on 3N, supine with  and aide present bedside. The pt was eager to get OOB and to her adaptive wheelchair ASAP in preparation for discharge home today.

## 2018-09-24 NOTE — PHYSICAL THERAPY INITIAL EVALUATION ADULT - RANGE OF MOTION EXAMINATION, REHAB EVAL
bilateral LE flexed/contracted with excessive right hip internal rotation, knee valgus, requiring a pillow between both knees to reduce the risk for passive breakdown between both knees, especially noted during OOB to w/c sitting.

## 2018-09-24 NOTE — DISCHARGE NOTE ADULT - MEDICATION SUMMARY - MEDICATIONS TO TAKE
I will START or STAY ON the medications listed below when I get home from the hospital:    Nucynta  mg oral tablet, extended release  -- 1 tab(s) by mouth 2 times a day  -- Indication: For home med    oxyCODONE 10 mg oral tablet  -- 1 tab(s) by mouth every 4 hours, As Needed  -- Indication: For home med    gabapentin 300 mg oral capsule  -- 1 cap(s) by mouth 3 times a day  -- Indication: For home med    DULoxetine 20 mg oral delayed release capsule  -- 1 cap(s) by mouth 2 times a day  -- Indication: For home med    atorvastatin 40 mg oral tablet  -- 1 tab(s) by mouth once a day  -- Indication: For home med    labetalol 200 mg oral tablet  -- 2 tab(s) by mouth 3 times a day  -- Indication: For home med    cefuroxime 500 mg oral tablet  -- 1 tab(s) by mouth 2 times a day   -- Finish all this medication unless otherwise directed by prescriber.  Medication should be taken with plenty of water.  Take with food or milk.    -- Indication: For home med    baclofen  -- pump, dose and rate unknown.     Prescribed by Dr. Tony Perez or Dr. Guy Nair  -- Indication: For home med    prednisoLONE acetate 1% ophthalmic suspension  -- 1 drop(s) in the left eye 4 times a day  -- Indication: For home med

## 2018-09-24 NOTE — PROGRESS NOTE ADULT - REASON FOR ADMISSION
lethargy, poor po intake, fever

## 2018-09-25 ENCOUNTER — RX RENEWAL (OUTPATIENT)
Age: 68
End: 2018-09-25

## 2018-09-25 DIAGNOSIS — M79.659 PAIN IN UNSPECIFIED THIGH: ICD-10-CM

## 2018-09-26 ENCOUNTER — FORM ENCOUNTER (OUTPATIENT)
Age: 68
End: 2018-09-26

## 2018-09-26 RX ORDER — TAPENTADOL HYDROCHLORIDE 100 MG/1
100 TABLET, FILM COATED, EXTENDED RELEASE ORAL TWICE DAILY
Qty: 60 | Refills: 0 | Status: ACTIVE | COMMUNITY
Start: 2018-07-11 | End: 1900-01-01

## 2018-09-27 ENCOUNTER — OUTPATIENT (OUTPATIENT)
Dept: OUTPATIENT SERVICES | Facility: HOSPITAL | Age: 68
LOS: 1 days | End: 2018-09-27
Payer: MEDICARE

## 2018-09-27 ENCOUNTER — APPOINTMENT (OUTPATIENT)
Dept: RADIOLOGY | Facility: CLINIC | Age: 68
End: 2018-09-27
Payer: MEDICARE

## 2018-09-27 DIAGNOSIS — Z98.89 OTHER SPECIFIED POSTPROCEDURAL STATES: Chronic | ICD-10-CM

## 2018-09-27 DIAGNOSIS — I10 ESSENTIAL (PRIMARY) HYPERTENSION: ICD-10-CM
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
## 2018-09-27 DIAGNOSIS — A41.9 SEPSIS, UNSPECIFIED ORGANISM: ICD-10-CM

## 2018-09-27 DIAGNOSIS — Z99.3 DEPENDENCE ON WHEELCHAIR: ICD-10-CM

## 2018-09-27 DIAGNOSIS — M53.9 DORSOPATHY, UNSPECIFIED: Chronic | ICD-10-CM

## 2018-09-27 DIAGNOSIS — I24.8 OTHER FORMS OF ACUTE ISCHEMIC HEART DISEASE: ICD-10-CM

## 2018-09-27 DIAGNOSIS — T83.518A INFECTION AND INFLAMMATORY REACTION DUE TO OTHER URINARY CATHETER, INITIAL ENCOUNTER: ICD-10-CM

## 2018-09-27 DIAGNOSIS — D69.6 THROMBOCYTOPENIA, UNSPECIFIED: ICD-10-CM

## 2018-09-27 DIAGNOSIS — R65.20 SEVERE SEPSIS WITHOUT SEPTIC SHOCK: ICD-10-CM

## 2018-09-27 DIAGNOSIS — Z94.7 CORNEAL TRANSPLANT STATUS: Chronic | ICD-10-CM

## 2018-09-27 DIAGNOSIS — I95.9 HYPOTENSION, UNSPECIFIED: ICD-10-CM

## 2018-09-27 DIAGNOSIS — N39.0 URINARY TRACT INFECTION, SITE NOT SPECIFIED: ICD-10-CM

## 2018-09-27 DIAGNOSIS — D64.9 ANEMIA, UNSPECIFIED: ICD-10-CM

## 2018-09-27 DIAGNOSIS — Z86.73 PERSONAL HISTORY OF TRANSIENT ISCHEMIC ATTACK (TIA), AND CEREBRAL INFARCTION WITHOUT RESIDUAL DEFICITS: ICD-10-CM

## 2018-09-27 DIAGNOSIS — G93.41 METABOLIC ENCEPHALOPATHY: ICD-10-CM

## 2018-09-27 DIAGNOSIS — K56.41 FECAL IMPACTION: ICD-10-CM

## 2018-09-27 DIAGNOSIS — E87.1 HYPO-OSMOLALITY AND HYPONATREMIA: ICD-10-CM

## 2018-09-27 DIAGNOSIS — G82.20 PARAPLEGIA, UNSPECIFIED: ICD-10-CM

## 2018-09-27 DIAGNOSIS — Z00.8 ENCOUNTER FOR OTHER GENERAL EXAMINATION: ICD-10-CM
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
## 2018-09-27 DIAGNOSIS — Z95.828 PRESENCE OF OTHER VASCULAR IMPLANTS AND GRAFTS: Chronic | ICD-10-CM

## 2018-09-27 DIAGNOSIS — E87.6 HYPOKALEMIA: ICD-10-CM

## 2018-09-27 PROCEDURE — 73552 X-RAY EXAM OF FEMUR 2/>: CPT | Mod: 26,RT

## 2018-09-27 PROCEDURE — 73502 X-RAY EXAM HIP UNI 2-3 VIEWS: CPT | Mod: 26,RT

## 2018-09-27 PROCEDURE — 73502 X-RAY EXAM HIP UNI 2-3 VIEWS: CPT

## 2018-09-27 PROCEDURE — 73552 X-RAY EXAM OF FEMUR 2/>: CPT

## 2018-10-02 ENCOUNTER — OUTPATIENT (OUTPATIENT)
Dept: OUTPATIENT SERVICES | Facility: HOSPITAL | Age: 68
LOS: 1 days | Discharge: HOME | End: 2018-10-02
Payer: MEDICARE

## 2018-10-02 DIAGNOSIS — Z95.828 PRESENCE OF OTHER VASCULAR IMPLANTS AND GRAFTS: Chronic | ICD-10-CM

## 2018-10-02 DIAGNOSIS — L89.153 PRESSURE ULCER OF SACRAL REGION, STAGE 3: ICD-10-CM

## 2018-10-02 DIAGNOSIS — M53.9 DORSOPATHY, UNSPECIFIED: Chronic | ICD-10-CM

## 2018-10-02 DIAGNOSIS — Z98.89 OTHER SPECIFIED POSTPROCEDURAL STATES: Chronic | ICD-10-CM

## 2018-10-02 DIAGNOSIS — Z94.7 CORNEAL TRANSPLANT STATUS: Chronic | ICD-10-CM

## 2018-10-02 PROCEDURE — G0463: CPT

## 2018-10-03 DIAGNOSIS — Z79.899 OTHER LONG TERM (CURRENT) DRUG THERAPY: ICD-10-CM

## 2018-10-03 DIAGNOSIS — Z98.890 OTHER SPECIFIED POSTPROCEDURAL STATES: ICD-10-CM

## 2018-10-03 DIAGNOSIS — Z88.8 ALLERGY STATUS TO OTHER DRUGS, MEDICAMENTS AND BIOLOGICAL SUBSTANCES STATUS: ICD-10-CM

## 2018-10-03 DIAGNOSIS — Z87.891 PERSONAL HISTORY OF NICOTINE DEPENDENCE: ICD-10-CM

## 2018-10-03 DIAGNOSIS — L89.152 PRESSURE ULCER OF SACRAL REGION, STAGE 2: ICD-10-CM

## 2018-10-03 DIAGNOSIS — R32 UNSPECIFIED URINARY INCONTINENCE: ICD-10-CM

## 2018-10-03 DIAGNOSIS — R63.6 UNDERWEIGHT: ICD-10-CM

## 2018-10-03 DIAGNOSIS — G82.20 PARAPLEGIA, UNSPECIFIED: ICD-10-CM

## 2018-10-05 ENCOUNTER — APPOINTMENT (OUTPATIENT)
Dept: PHYSICAL MEDICINE AND REHAB | Facility: HOME HEALTH | Age: 68
End: 2018-10-05
Payer: MEDICARE

## 2018-10-05 PROCEDURE — 62368 ANALYZE SP INF PUMP W/REPROG: CPT

## 2018-10-12 ENCOUNTER — APPOINTMENT (OUTPATIENT)
Dept: OPHTHALMOLOGY | Facility: CLINIC | Age: 68
End: 2018-10-12
Payer: MEDICARE

## 2018-10-12 DIAGNOSIS — H17.9 UNSPECIFIED CORNEAL SCAR AND OPACITY: ICD-10-CM

## 2018-10-12 PROBLEM — Z94.7: Status: ACTIVE | Noted: 2018-10-12

## 2018-10-12 PROCEDURE — 92012 INTRM OPH EXAM EST PATIENT: CPT

## 2018-10-12 RX ORDER — PREDNISOLONE ACETATE 10 MG/ML
1 SUSPENSION/ DROPS OPHTHALMIC 4 TIMES DAILY
Qty: 1 | Refills: 5 | Status: ACTIVE | COMMUNITY
Start: 2018-10-12 | End: 1900-01-01

## 2018-10-12 RX ORDER — POLYMYXIN B SULFATE AND TRIMETHOPRIM 10000; 1 [USP'U]/ML; MG/ML
10000-0.1 SOLUTION OPHTHALMIC 4 TIMES DAILY
Qty: 1 | Refills: 5 | Status: ACTIVE | COMMUNITY
Start: 2018-10-12 | End: 1900-01-01

## 2018-10-17 ENCOUNTER — RX RENEWAL (OUTPATIENT)
Age: 68
End: 2018-10-17

## 2018-10-17 ENCOUNTER — TRANSCRIPTION ENCOUNTER (OUTPATIENT)
Age: 68
End: 2018-10-17

## 2018-10-18 ENCOUNTER — MEDICATION RENEWAL (OUTPATIENT)
Age: 68
End: 2018-10-18

## 2018-10-18 NOTE — ED ADULT TRIAGE NOTE - CCCP TRG CHIEF CMPLNT
[facility] Call Center    Phone Message    May a detailed message be left on voicemail: yes    Reason for Call: Previous pt of Dr. Morrow scheduled appt for f/u to evaluate hearing. Pt is scheduled with a WIN and a consult with Dr. Morrow on 11/28. 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 Pt is now returning for a hearing eval but also because her ENT at [place] noted a scab on her tympanic membrane. He stated that it is healing but pt felt some changes in her ear while on a plane recently and would like it to be evaluated by Dr. Morrow. Pt would like to know if Dr. Morrow would like her to complete an MRI prior to this appt as was the previous routine. Please contact pt to discuss. Thank you!    Action Taken: Message routed to:  [facility] ([facility]): ent     dizziness

## 2018-10-23 ENCOUNTER — OUTPATIENT (OUTPATIENT)
Dept: OUTPATIENT SERVICES | Facility: HOSPITAL | Age: 68
LOS: 1 days | Discharge: HOME | End: 2018-10-23
Payer: MEDICARE

## 2018-10-23 DIAGNOSIS — L89.152 PRESSURE ULCER OF SACRAL REGION, STAGE 2: ICD-10-CM

## 2018-10-23 DIAGNOSIS — Z94.7 CORNEAL TRANSPLANT STATUS: Chronic | ICD-10-CM

## 2018-10-23 DIAGNOSIS — Z95.828 PRESENCE OF OTHER VASCULAR IMPLANTS AND GRAFTS: Chronic | ICD-10-CM

## 2018-10-23 DIAGNOSIS — M53.9 DORSOPATHY, UNSPECIFIED: Chronic | ICD-10-CM

## 2018-10-23 DIAGNOSIS — Z98.89 OTHER SPECIFIED POSTPROCEDURAL STATES: Chronic | ICD-10-CM

## 2018-10-23 PROCEDURE — G0463: CPT

## 2018-10-25 DIAGNOSIS — Z79.899 OTHER LONG TERM (CURRENT) DRUG THERAPY: ICD-10-CM

## 2018-10-25 DIAGNOSIS — R32 UNSPECIFIED URINARY INCONTINENCE: ICD-10-CM

## 2018-10-25 DIAGNOSIS — Z87.891 PERSONAL HISTORY OF NICOTINE DEPENDENCE: ICD-10-CM

## 2018-10-25 DIAGNOSIS — R63.6 UNDERWEIGHT: ICD-10-CM

## 2018-10-25 DIAGNOSIS — Z88.8 ALLERGY STATUS TO OTHER DRUGS, MEDICAMENTS AND BIOLOGICAL SUBSTANCES: ICD-10-CM

## 2018-10-25 DIAGNOSIS — Z98.890 OTHER SPECIFIED POSTPROCEDURAL STATES: ICD-10-CM

## 2018-10-25 DIAGNOSIS — Z99.3 DEPENDENCE ON WHEELCHAIR: ICD-10-CM

## 2018-10-25 DIAGNOSIS — L89.152 PRESSURE ULCER OF SACRAL REGION, STAGE 2: ICD-10-CM

## 2018-10-25 DIAGNOSIS — G82.20 PARAPLEGIA, UNSPECIFIED: ICD-10-CM

## 2018-11-06 RX ORDER — DIAZEPAM 5 MG/1
5 TABLET ORAL EVERY 6 HOURS
Qty: 120 | Refills: 0 | Status: ACTIVE | COMMUNITY
Start: 2018-11-06 | End: 1900-01-01

## 2018-11-12 ENCOUNTER — APPOINTMENT (OUTPATIENT)
Dept: PHYSICAL MEDICINE AND REHAB | Facility: CLINIC | Age: 68
End: 2018-11-12
Payer: MEDICARE

## 2018-11-12 PROCEDURE — 99213 OFFICE O/P EST LOW 20 MIN: CPT | Mod: 25

## 2018-11-12 PROCEDURE — 62368 ANALYZE SP INF PUMP W/REPROG: CPT

## 2018-11-12 RX ORDER — GABAPENTIN 600 MG/1
600 TABLET, COATED ORAL 3 TIMES DAILY
Qty: 90 | Refills: 3 | Status: ACTIVE | COMMUNITY
Start: 2018-11-12 | End: 1900-01-01

## 2018-11-13 ENCOUNTER — TRANSCRIPTION ENCOUNTER (OUTPATIENT)
Age: 68
End: 2018-11-13

## 2018-12-24 NOTE — H&P PST ADULT - ATTENDING COMMENTS
>50% improvement in pain despite increased numbness on right.  Risks and benefits discussed with patient. Patient would like to undergo permanent device placement.

## 2018-12-24 NOTE — H&P PST ADULT - PMH
Anemia  chronic  Aortic dissection, abdominal  Type B Watched regularly  Aortic dissection, thoracic  Type A Repaired 2009  Blindness of left eye  hx corneal transplant 2018  Aug.2018  Dorsalgia of lumbar region    Hematoma  spinal  September  treated  September 2018  HTN (Hypertension)    Osteoporosis    PAD (peripheral artery disease)    Paraplegia    Self-catheterizes urinary bladder    TIA (transient ischemic attack)    UTI (urinary tract infection)  stable x 3 months  Uveitis Anemia  chronic  Aortic dissection, thoracic  Type A Repaired 2009  Blindness of left eye  hx corneal transplant 2018  Aug.2018  Dorsalgia of lumbar region  on pain medication /baclofen po and pump  Hematoma  spinal  September  treated  September 2018  HTN (Hypertension)  on meds  Osteoporosis    PAD (peripheral artery disease)    Paraplegia  on wheelchair goes to physical therapy 2 x weekly  Self-catheterizes urinary bladder    TIA (transient ischemic attack)    UTI (urinary tract infection)  stable x 3 months  Uveitis

## 2018-12-24 NOTE — H&P PST ADULT - PROBLEM SELECTOR PLAN 1
Stage 1 ,Spinal cord stimulator trial  if successful has scheduled for Stage 2 spinal stimulator implant on JAn 16/2019  PST instruction given to patient and patient nursing aid with 3 days antibacterial soap given for prevention of post op infection   CBC/BMP ,nasal swab sent pending result   To continue taking pain medication as needed   Patient stated she was seen by PMD for preop medical evaluation   On wheelchair needed help with mobility

## 2018-12-24 NOTE — H&P PST ADULT - PROBLEM SELECTOR PLAN 3
Needed assistance with mobility /wheelchair bound    Self catheterizes as needed and self  manual extraction of stool as needed

## 2018-12-24 NOTE — H&P PST ADULT - GENITOURINARY COMMENTS
needed  catheterization PRN needed  self catheterization PRN on diaper  skin breakdowns on pelvic area

## 2018-12-24 NOTE — H&P PST ADULT - PSH
Disorder of spine  detethering  H/O Spinal surgery  laminectomies 2014  History of corneal transplant  left corneal transplant on 5/21/2018  Presence of IVC filter  2014 ?  S/P aortic dissection repair  Type A Dissection repair /2009 Disorder of spine  unthetethering 2 x  H/O Spinal surgery  laminectomies 2014  History of corneal transplant  left corneal transplant on 5/21/2018  Presence of IVC filter  2014 ?  S/P aortic dissection repair  Type A Dissection repair /2009   descending aortic aneurysm repair 9/2016

## 2018-12-24 NOTE — H&P PST ADULT - OPHTHALMOLOGIC COMMENTS
wear eyeglasses / recent corneal transplant wear eyeglasses / recent corneal transplant left hx blindness

## 2018-12-24 NOTE — H&P PST ADULT - NS MD HP INPLANTS MED DEV
IVC filter / Baclofen pump  right lower side of abdomen corneal eye transplant5/2018 /IVC filter / Baclofen pump  right lower side of abdomen

## 2018-12-24 NOTE — H&P PST ADULT - MUSCULOSKELETAL COMMENTS
wheelchair bound pearlized from waist to ankle 2014  / due to spontaneous bleeding spinal cord wheelchair bound paralyzed from waist to ankle 2014  / due to spontaneous bleeding spinal cord

## 2018-12-25 PROBLEM — D64.9 ANEMIA, UNSPECIFIED: Chronic | Status: ACTIVE | Noted: 2018-05-30

## 2018-12-25 PROBLEM — M54.5 LOW BACK PAIN: Chronic | Status: ACTIVE | Noted: 2018-05-30

## 2018-12-31 NOTE — ASU DISCHARGE PLAN (ADULT/PEDIATRIC). - MEDICATION SUMMARY - MEDICATIONS TO TAKE
I will START or STAY ON the medications listed below when I get home from the hospital:    oxyCODONE 10 mg oral tablet  --  5 mg 1 tab(s) by mouth every 4 hours, As Needed  -- Indication: For pain    levorphanol 2 mg oral tablet  -- 1 tab(s) by mouth 3 times a day (after meals)  -- Indication: For pain    gabapentin 300 mg oral capsule  -- 1 cap(s) by mouth 3 times a day  -- Indication: For pain    Cymbalta 20 mg oral delayed release capsule  -- 1 cap(s) by mouth 2 times a day  -- Indication: For antdep    Ambien 10 mg oral tablet  -- 1 tab(s) by mouth once a day (at bedtime)  -- Indication: For anxiety    labetalol 200 mg oral tablet  -- 2 tab(s) by mouth 3 times a day  -- Indication: For htn    baclofen  -- 20 mg orally   Baclofen pump auto deliverd unknown dose  -- Indication: For pain    baclofen 20 mg oral tablet  -- 1 tab(s) by mouth 3 times a day  -- Indication: For pain    prednisoLONE acetate 1% ophthalmic suspension  -- 1 drop(s) to each affected eye 4 times a day  -- Indication: For eye drops    Bactrim 400 mg-80 mg oral tablet  -- 2 tab(s) by mouth 2 times a day  -- Indication: For antibiotics

## 2018-12-31 NOTE — PRE-ANESTHESIA EVALUATION ADULT - NSANTHPMHFT_GEN_ALL_CORE
hx of aortic dissections s/p repair, SDH s/p drainage '14. Has weakness in lower extremity, L> R, able to feel but not able to ambulate. Wheelchair bound for 4 years. hx of aortic dissections s/p repair, SDH s/p drainage '14. Has weakness in lower extremity, R>L, able to feel but not able to ambulate. Wheelchair bound for 4 years.

## 2018-12-31 NOTE — PRE-ANESTHESIA EVALUATION ADULT - NSANTHAIRWAYFT_ENT_ALL_CORE
reduced cervical ROM, TMD < 3 finger breadth. reduced cervical ROM, Decreased neck extension, TMD < 3 finger breadth.

## 2018-12-31 NOTE — PROGRESS NOTE ADULT - SUBJECTIVE AND OBJECTIVE BOX
Vital Signs Last 24 Hrs  T(C): 36.2 (31 Dec 2018 11:45), Max: 37.2 (31 Dec 2018 05:50)  T(F): 97.2 (31 Dec 2018 11:45), Max: 99 (31 Dec 2018 05:50)  HR: 71 (31 Dec 2018 18:00) (71 - 87)  BP: 100/57 (31 Dec 2018 18:00) (100/57 - 186/92)  BP(mean): 75 (31 Dec 2018 18:00) (75 - 131)  RR: 15 (31 Dec 2018 18:00) (13 - 18)  SpO2: 96% (31 Dec 2018 18:00) (93% - 100%)    EXAM  LLE 1-2/5, RLE 0/5, BUE strong and symmetric

## 2018-12-31 NOTE — BRIEF OPERATIVE NOTE - PROCEDURE
<<-----Click on this checkbox to enter Procedure Spinal surgery  12/31/2018  [facility] perc  Active  [id number] Spinal surgery  12/31/2018  SCS paddles, open  Active  Adalberto Escoto

## 2018-12-31 NOTE — PRE-ANESTHESIA EVALUATION ADULT - NSANTHPEFT_GEN_ALL_CORE
GENERAL: NAD  CHEST/LUNG: Clear to auscultation bilaterally.   HEART: Regular rate and rhythm; No murmurs, rubs, or gallops  ABDOMEN: Soft, Nontender, Nondistended; Bowel sounds present. + Baclofen pump.   EXTREMITIES: Atrophied lower extremmitis. GENERAL: cooperative,  NAD  CHEST/LUNG: Clear to auscultation bilaterally.   HEART: Regular rate and rhythm; No murmurs, rubs, or gallops  ABDOMEN: Soft, Nontender, Nondistended; Bowel sounds present. + Baclofen pump.   EXTREMITIES: Atrophied lower extremmitis. Pt can not ambulate, lower extremity weakness R>L

## 2019-01-01 ENCOUNTER — TRANSCRIPTION ENCOUNTER (OUTPATIENT)
Age: 69
End: 2019-01-01

## 2019-01-01 ENCOUNTER — APPOINTMENT (OUTPATIENT)
Dept: NEUROSURGERY | Facility: CLINIC | Age: 69
End: 2019-01-01

## 2019-01-01 ENCOUNTER — INPATIENT (INPATIENT)
Facility: HOSPITAL | Age: 69
LOS: 19 days | Discharge: HOME | DRG: 377 | End: 2019-09-24
Attending: HOSPITALIST | Admitting: HOSPITALIST
Payer: MEDICARE

## 2019-01-01 ENCOUNTER — APPOINTMENT (OUTPATIENT)
Dept: PHYSICAL MEDICINE AND REHAB | Facility: CLINIC | Age: 69
End: 2019-01-01

## 2019-01-01 ENCOUNTER — INBOUND DOCUMENT (OUTPATIENT)
Age: 69
End: 2019-01-01

## 2019-01-01 ENCOUNTER — RX RENEWAL (OUTPATIENT)
Age: 69
End: 2019-01-01

## 2019-01-01 ENCOUNTER — APPOINTMENT (OUTPATIENT)
Dept: OPHTHALMOLOGY | Facility: CLINIC | Age: 69
End: 2019-01-01
Payer: MEDICARE

## 2019-01-01 ENCOUNTER — OUTPATIENT (OUTPATIENT)
Dept: OUTPATIENT SERVICES | Facility: HOSPITAL | Age: 69
LOS: 1 days | End: 2019-01-01
Payer: MEDICARE

## 2019-01-01 ENCOUNTER — INPATIENT (INPATIENT)
Facility: HOSPITAL | Age: 69
LOS: 14 days | Discharge: HOME HEALTH | DRG: 853 | End: 2019-08-27
Attending: SURGERY | Admitting: SURGERY
Payer: MEDICARE

## 2019-01-01 ENCOUNTER — APPOINTMENT (OUTPATIENT)
Dept: HEART AND VASCULAR | Facility: CLINIC | Age: 69
End: 2019-01-01

## 2019-01-01 ENCOUNTER — APPOINTMENT (OUTPATIENT)
Dept: PHYSICAL MEDICINE AND REHAB | Facility: CLINIC | Age: 69
End: 2019-01-01
Payer: MEDICARE

## 2019-01-01 ENCOUNTER — EMERGENCY (EMERGENCY)
Facility: HOSPITAL | Age: 69
LOS: 0 days | Discharge: ANOTHER HOSPITAL | End: 2019-09-27
Attending: EMERGENCY MEDICINE
Payer: MEDICARE

## 2019-01-01 ENCOUNTER — APPOINTMENT (OUTPATIENT)
Dept: NEUROSURGERY | Facility: CLINIC | Age: 69
End: 2019-01-01
Payer: MEDICARE

## 2019-01-01 ENCOUNTER — APPOINTMENT (OUTPATIENT)
Dept: OPHTHALMOLOGY | Facility: CLINIC | Age: 69
End: 2019-01-01

## 2019-01-01 ENCOUNTER — APPOINTMENT (OUTPATIENT)
Dept: PAIN MANAGEMENT | Facility: CLINIC | Age: 69
End: 2019-01-01

## 2019-01-01 ENCOUNTER — INPATIENT (INPATIENT)
Facility: HOSPITAL | Age: 69
LOS: 8 days | Discharge: HOME HEALTH | DRG: 871 | End: 2019-10-25
Attending: INTERNAL MEDICINE | Admitting: INTERNAL MEDICINE
Payer: MEDICARE

## 2019-01-01 ENCOUNTER — RESULT REVIEW (OUTPATIENT)
Age: 69
End: 2019-01-01

## 2019-01-01 ENCOUNTER — NON-APPOINTMENT (OUTPATIENT)
Age: 69
End: 2019-01-01

## 2019-01-01 ENCOUNTER — INPATIENT (INPATIENT)
Facility: HOSPITAL | Age: 69
LOS: 3 days | Discharge: ANOTHER HOSPITAL | DRG: 378 | End: 2019-11-06
Attending: INTERNAL MEDICINE | Admitting: INTERNAL MEDICINE
Payer: MEDICARE

## 2019-01-01 ENCOUNTER — INPATIENT (INPATIENT)
Facility: HOSPITAL | Age: 69
LOS: 3 days | Discharge: HOME | DRG: 271 | End: 2019-11-10
Attending: THORACIC SURGERY (CARDIOTHORACIC VASCULAR SURGERY) | Admitting: THORACIC SURGERY (CARDIOTHORACIC VASCULAR SURGERY)
Payer: MEDICARE

## 2019-01-01 ENCOUNTER — EMERGENCY (EMERGENCY)
Facility: HOSPITAL | Age: 69
LOS: 0 days | Discharge: OTHER INSTITUTION | End: 2019-11-12
Attending: EMERGENCY MEDICINE
Payer: MEDICARE

## 2019-01-01 ENCOUNTER — INPATIENT (INPATIENT)
Facility: HOSPITAL | Age: 69
LOS: 4 days | Discharge: ANOTHER HOSPITAL | DRG: 377 | End: 2019-11-02
Attending: HOSPITALIST | Admitting: HOSPITALIST
Payer: MEDICARE

## 2019-01-01 ENCOUNTER — INPATIENT (INPATIENT)
Facility: HOSPITAL | Age: 69
LOS: 2 days | Discharge: HOME | DRG: 377 | End: 2019-10-04
Attending: INTERNAL MEDICINE | Admitting: HOSPITALIST
Payer: MEDICARE

## 2019-01-01 ENCOUNTER — INPATIENT (INPATIENT)
Facility: HOSPITAL | Age: 69
LOS: 20 days | DRG: 326 | End: 2019-12-03
Attending: SURGERY | Admitting: SURGERY
Payer: MEDICARE

## 2019-01-01 ENCOUNTER — INPATIENT (INPATIENT)
Facility: HOSPITAL | Age: 69
LOS: 8 days | Discharge: HOME | DRG: 377 | End: 2019-10-16
Attending: HOSPITALIST | Admitting: HOSPITALIST
Payer: MEDICARE

## 2019-01-01 ENCOUNTER — APPOINTMENT (OUTPATIENT)
Dept: NEUROSURGERY | Facility: HOSPITAL | Age: 69
End: 2019-01-01

## 2019-01-01 ENCOUNTER — INPATIENT (INPATIENT)
Facility: HOSPITAL | Age: 69
LOS: 2 days | Discharge: NURSING, REHAB OR LONG-TERM CARE | DRG: 377 | End: 2019-09-30
Attending: INTERNAL MEDICINE | Admitting: HOSPITALIST
Payer: MEDICARE

## 2019-01-01 ENCOUNTER — APPOINTMENT (OUTPATIENT)
Dept: CARDIOTHORACIC SURGERY | Facility: HOSPITAL | Age: 69
End: 2019-01-01

## 2019-01-01 ENCOUNTER — INPATIENT (INPATIENT)
Facility: HOSPITAL | Age: 69
LOS: 6 days | Discharge: ANOTHER HOSPITAL | DRG: 377 | End: 2019-09-04
Attending: INTERNAL MEDICINE | Admitting: INTERNAL MEDICINE
Payer: MEDICARE

## 2019-01-01 VITALS
DIASTOLIC BLOOD PRESSURE: 51 MMHG | HEIGHT: 68 IN | WEIGHT: 110.01 LBS | RESPIRATION RATE: 17 BRPM | OXYGEN SATURATION: 100 % | TEMPERATURE: 98 F | SYSTOLIC BLOOD PRESSURE: 84 MMHG | HEART RATE: 142 BPM

## 2019-01-01 VITALS
HEART RATE: 72 BPM | TEMPERATURE: 98 F | SYSTOLIC BLOOD PRESSURE: 127 MMHG | RESPIRATION RATE: 14 BRPM | DIASTOLIC BLOOD PRESSURE: 72 MMHG

## 2019-01-01 VITALS
HEART RATE: 81 BPM | TEMPERATURE: 98 F | RESPIRATION RATE: 18 BRPM | SYSTOLIC BLOOD PRESSURE: 152 MMHG | OXYGEN SATURATION: 96 % | DIASTOLIC BLOOD PRESSURE: 72 MMHG

## 2019-01-01 VITALS
SYSTOLIC BLOOD PRESSURE: 120 MMHG | TEMPERATURE: 99 F | RESPIRATION RATE: 17 BRPM | HEART RATE: 147 BPM | DIASTOLIC BLOOD PRESSURE: 65 MMHG | OXYGEN SATURATION: 98 % | WEIGHT: 105.82 LBS

## 2019-01-01 VITALS
HEART RATE: 68 BPM | OXYGEN SATURATION: 95 % | RESPIRATION RATE: 16 BRPM | SYSTOLIC BLOOD PRESSURE: 131 MMHG | DIASTOLIC BLOOD PRESSURE: 60 MMHG

## 2019-01-01 VITALS
OXYGEN SATURATION: 97 % | TEMPERATURE: 98 F | HEIGHT: 67 IN | HEART RATE: 71 BPM | DIASTOLIC BLOOD PRESSURE: 75 MMHG | WEIGHT: 126.99 LBS | SYSTOLIC BLOOD PRESSURE: 166 MMHG | RESPIRATION RATE: 16 BRPM

## 2019-01-01 VITALS
DIASTOLIC BLOOD PRESSURE: 65 MMHG | HEART RATE: 68 BPM | OXYGEN SATURATION: 95 % | SYSTOLIC BLOOD PRESSURE: 142 MMHG | TEMPERATURE: 97.9 F

## 2019-01-01 VITALS
SYSTOLIC BLOOD PRESSURE: 126 MMHG | DIASTOLIC BLOOD PRESSURE: 75 MMHG | WEIGHT: 106.92 LBS | HEART RATE: 84 BPM | RESPIRATION RATE: 10 BRPM | HEART RATE: 87 BPM | TEMPERATURE: 98 F | RESPIRATION RATE: 17 BRPM | SYSTOLIC BLOOD PRESSURE: 125 MMHG | DIASTOLIC BLOOD PRESSURE: 56 MMHG | OXYGEN SATURATION: 94 % | HEIGHT: 67 IN | OXYGEN SATURATION: 95 %

## 2019-01-01 VITALS
HEART RATE: 74 BPM | BODY MASS INDEX: 16.64 KG/M2 | WEIGHT: 106 LBS | HEIGHT: 67 IN | SYSTOLIC BLOOD PRESSURE: 104 MMHG | DIASTOLIC BLOOD PRESSURE: 61 MMHG

## 2019-01-01 VITALS
TEMPERATURE: 98 F | WEIGHT: 111.99 LBS | HEART RATE: 94 BPM | HEIGHT: 67 IN | DIASTOLIC BLOOD PRESSURE: 53 MMHG | RESPIRATION RATE: 16 BRPM | OXYGEN SATURATION: 100 % | SYSTOLIC BLOOD PRESSURE: 119 MMHG

## 2019-01-01 VITALS
SYSTOLIC BLOOD PRESSURE: 104 MMHG | HEART RATE: 133 BPM | OXYGEN SATURATION: 96 % | WEIGHT: 139.99 LBS | DIASTOLIC BLOOD PRESSURE: 39 MMHG | TEMPERATURE: 98 F | HEIGHT: 65 IN | RESPIRATION RATE: 16 BRPM

## 2019-01-01 VITALS — DIASTOLIC BLOOD PRESSURE: 59 MMHG | HEART RATE: 80 BPM | OXYGEN SATURATION: 96 % | SYSTOLIC BLOOD PRESSURE: 104 MMHG

## 2019-01-01 VITALS
HEART RATE: 70 BPM | OXYGEN SATURATION: 94 % | DIASTOLIC BLOOD PRESSURE: 60 MMHG | RESPIRATION RATE: 16 BRPM | TEMPERATURE: 98 F | SYSTOLIC BLOOD PRESSURE: 110 MMHG

## 2019-01-01 VITALS
TEMPERATURE: 97 F | WEIGHT: 139.99 LBS | HEIGHT: 63 IN | HEART RATE: 140 BPM | RESPIRATION RATE: 16 BRPM | OXYGEN SATURATION: 96 % | SYSTOLIC BLOOD PRESSURE: 101 MMHG | DIASTOLIC BLOOD PRESSURE: 66 MMHG

## 2019-01-01 VITALS
OXYGEN SATURATION: 96 % | WEIGHT: 110.01 LBS | SYSTOLIC BLOOD PRESSURE: 100 MMHG | RESPIRATION RATE: 18 BRPM | DIASTOLIC BLOOD PRESSURE: 68 MMHG | HEART RATE: 97 BPM | TEMPERATURE: 98 F | HEIGHT: 67 IN

## 2019-01-01 VITALS
RESPIRATION RATE: 20 BRPM | OXYGEN SATURATION: 97 % | TEMPERATURE: 98 F | DIASTOLIC BLOOD PRESSURE: 49 MMHG | HEART RATE: 69 BPM | SYSTOLIC BLOOD PRESSURE: 100 MMHG

## 2019-01-01 VITALS
RESPIRATION RATE: 15 BRPM | HEART RATE: 120 BPM | TEMPERATURE: 98 F | SYSTOLIC BLOOD PRESSURE: 135 MMHG | OXYGEN SATURATION: 100 % | DIASTOLIC BLOOD PRESSURE: 69 MMHG

## 2019-01-01 VITALS
RESPIRATION RATE: 16 BRPM | HEART RATE: 86 BPM | TEMPERATURE: 99 F | SYSTOLIC BLOOD PRESSURE: 150 MMHG | DIASTOLIC BLOOD PRESSURE: 49 MMHG | OXYGEN SATURATION: 99 %

## 2019-01-01 VITALS
SYSTOLIC BLOOD PRESSURE: 91 MMHG | RESPIRATION RATE: 16 BRPM | TEMPERATURE: 98 F | DIASTOLIC BLOOD PRESSURE: 57 MMHG | OXYGEN SATURATION: 95 % | HEART RATE: 75 BPM

## 2019-01-01 VITALS
RESPIRATION RATE: 18 BRPM | SYSTOLIC BLOOD PRESSURE: 145 MMHG | DIASTOLIC BLOOD PRESSURE: 76 MMHG | HEART RATE: 95 BPM | TEMPERATURE: 98 F | OXYGEN SATURATION: 93 %

## 2019-01-01 VITALS
DIASTOLIC BLOOD PRESSURE: 68 MMHG | OXYGEN SATURATION: 93 % | HEIGHT: 67 IN | HEART RATE: 63 BPM | SYSTOLIC BLOOD PRESSURE: 108 MMHG | TEMPERATURE: 97 F | RESPIRATION RATE: 18 BRPM

## 2019-01-01 VITALS
OXYGEN SATURATION: 98 % | HEART RATE: 93 BPM | DIASTOLIC BLOOD PRESSURE: 71 MMHG | RESPIRATION RATE: 18 BRPM | SYSTOLIC BLOOD PRESSURE: 127 MMHG | TEMPERATURE: 98 F

## 2019-01-01 VITALS
TEMPERATURE: 98 F | SYSTOLIC BLOOD PRESSURE: 117 MMHG | HEART RATE: 122 BPM | HEIGHT: 67 IN | RESPIRATION RATE: 19 BRPM | DIASTOLIC BLOOD PRESSURE: 61 MMHG | WEIGHT: 110.01 LBS | OXYGEN SATURATION: 100 %

## 2019-01-01 VITALS
OXYGEN SATURATION: 95 % | RESPIRATION RATE: 18 BRPM | HEART RATE: 74 BPM | DIASTOLIC BLOOD PRESSURE: 56 MMHG | SYSTOLIC BLOOD PRESSURE: 111 MMHG

## 2019-01-01 VITALS
TEMPERATURE: 98 F | RESPIRATION RATE: 18 BRPM | HEART RATE: 80 BPM | DIASTOLIC BLOOD PRESSURE: 71 MMHG | SYSTOLIC BLOOD PRESSURE: 145 MMHG | OXYGEN SATURATION: 95 %

## 2019-01-01 VITALS
HEART RATE: 72 BPM | OXYGEN SATURATION: 94 % | SYSTOLIC BLOOD PRESSURE: 131 MMHG | RESPIRATION RATE: 16 BRPM | DIASTOLIC BLOOD PRESSURE: 64 MMHG

## 2019-01-01 VITALS
OXYGEN SATURATION: 94 % | RESPIRATION RATE: 20 BRPM | SYSTOLIC BLOOD PRESSURE: 80 MMHG | HEART RATE: 125 BPM | HEIGHT: 67 IN | TEMPERATURE: 95 F | WEIGHT: 110.01 LBS | DIASTOLIC BLOOD PRESSURE: 44 MMHG

## 2019-01-01 VITALS — TEMPERATURE: 99 F

## 2019-01-01 VITALS
OXYGEN SATURATION: 93 % | SYSTOLIC BLOOD PRESSURE: 99 MMHG | TEMPERATURE: 99 F | DIASTOLIC BLOOD PRESSURE: 64 MMHG | HEART RATE: 74 BPM | RESPIRATION RATE: 18 BRPM

## 2019-01-01 VITALS
DIASTOLIC BLOOD PRESSURE: 93 MMHG | RESPIRATION RATE: 21 BRPM | SYSTOLIC BLOOD PRESSURE: 143 MMHG | OXYGEN SATURATION: 100 % | HEART RATE: 144 BPM

## 2019-01-01 DIAGNOSIS — D50.0 IRON DEFICIENCY ANEMIA SECONDARY TO BLOOD LOSS (CHRONIC): ICD-10-CM

## 2019-01-01 DIAGNOSIS — N30.01 ACUTE CYSTITIS WITH HEMATURIA: ICD-10-CM

## 2019-01-01 DIAGNOSIS — H54.7 UNSPECIFIED VISUAL LOSS: ICD-10-CM

## 2019-01-01 DIAGNOSIS — D62 ACUTE POSTHEMORRHAGIC ANEMIA: ICD-10-CM

## 2019-01-01 DIAGNOSIS — K92.2 GASTROINTESTINAL HEMORRHAGE, UNSPECIFIED: ICD-10-CM

## 2019-01-01 DIAGNOSIS — Z94.7 CORNEAL TRANSPLANT STATUS: ICD-10-CM

## 2019-01-01 DIAGNOSIS — H44.002 UNSPECIFIED PURULENT ENDOPHTHALMITIS, LEFT EYE: ICD-10-CM

## 2019-01-01 DIAGNOSIS — N31.9 NEUROMUSCULAR DYSFUNCTION OF BLADDER, UNSPECIFIED: ICD-10-CM

## 2019-01-01 DIAGNOSIS — M53.9 DORSOPATHY, UNSPECIFIED: Chronic | ICD-10-CM

## 2019-01-01 DIAGNOSIS — Z98.89 OTHER SPECIFIED POSTPROCEDURAL STATES: Chronic | ICD-10-CM

## 2019-01-01 DIAGNOSIS — N13.6 PYONEPHROSIS: ICD-10-CM

## 2019-01-01 DIAGNOSIS — H04.122 DRY EYE SYNDROME OF LEFT LACRIMAL GLAND: ICD-10-CM

## 2019-01-01 DIAGNOSIS — B00.52 HERPESVIRAL KERATITIS: ICD-10-CM

## 2019-01-01 DIAGNOSIS — D64.9 ANEMIA, UNSPECIFIED: ICD-10-CM

## 2019-01-01 DIAGNOSIS — Z29.9 ENCOUNTER FOR PROPHYLACTIC MEASURES, UNSPECIFIED: ICD-10-CM

## 2019-01-01 DIAGNOSIS — E43 UNSPECIFIED SEVERE PROTEIN-CALORIE MALNUTRITION: ICD-10-CM

## 2019-01-01 DIAGNOSIS — Z79.899 OTHER LONG TERM (CURRENT) DRUG THERAPY: ICD-10-CM

## 2019-01-01 DIAGNOSIS — Z95.828 PRESENCE OF OTHER VASCULAR IMPLANTS AND GRAFTS: Chronic | ICD-10-CM

## 2019-01-01 DIAGNOSIS — G93.41 METABOLIC ENCEPHALOPATHY: ICD-10-CM

## 2019-01-01 DIAGNOSIS — Z02.9 ENCOUNTER FOR ADMINISTRATIVE EXAMINATIONS, UNSPECIFIED: ICD-10-CM

## 2019-01-01 DIAGNOSIS — N39.0 URINARY TRACT INFECTION, SITE NOT SPECIFIED: ICD-10-CM

## 2019-01-01 DIAGNOSIS — I95.9 HYPOTENSION, UNSPECIFIED: ICD-10-CM

## 2019-01-01 DIAGNOSIS — R53.1 WEAKNESS: ICD-10-CM

## 2019-01-01 DIAGNOSIS — F44.4 CONVERSION DISORDER WITH MOTOR SYMPTOM OR DEFICIT: ICD-10-CM

## 2019-01-01 DIAGNOSIS — K62.89 OTHER SPECIFIED DISEASES OF ANUS AND RECTUM: ICD-10-CM

## 2019-01-01 DIAGNOSIS — Z91.89 OTHER SPECIFIED PERSONAL RISK FACTORS, NOT ELSEWHERE CLASSIFIED: ICD-10-CM

## 2019-01-01 DIAGNOSIS — I10 ESSENTIAL (PRIMARY) HYPERTENSION: ICD-10-CM

## 2019-01-01 DIAGNOSIS — R41.82 ALTERED MENTAL STATUS, UNSPECIFIED: ICD-10-CM

## 2019-01-01 DIAGNOSIS — K31.82 DIEULAFOY LESION (HEMORRHAGIC) OF STOMACH AND DUODENUM: ICD-10-CM

## 2019-01-01 DIAGNOSIS — A41.9 SEPSIS, UNSPECIFIED ORGANISM: ICD-10-CM

## 2019-01-01 DIAGNOSIS — N20.1 CALCULUS OF URETER: ICD-10-CM

## 2019-01-01 DIAGNOSIS — Z94.7 CORNEAL TRANSPLANT STATUS: Chronic | ICD-10-CM

## 2019-01-01 DIAGNOSIS — K29.70 GASTRITIS, UNSPECIFIED, WITHOUT BLEEDING: ICD-10-CM

## 2019-01-01 DIAGNOSIS — E86.0 DEHYDRATION: ICD-10-CM

## 2019-01-01 DIAGNOSIS — Z87.442 PERSONAL HISTORY OF URINARY CALCULI: ICD-10-CM

## 2019-01-01 DIAGNOSIS — G89.29 OTHER CHRONIC PAIN: ICD-10-CM

## 2019-01-01 DIAGNOSIS — I71.01 DISSECTION OF THORACIC AORTA: ICD-10-CM

## 2019-01-01 DIAGNOSIS — G82.20 PARAPLEGIA, UNSPECIFIED: ICD-10-CM

## 2019-01-01 DIAGNOSIS — K92.1 MELENA: ICD-10-CM

## 2019-01-01 DIAGNOSIS — I77.0 ARTERIOVENOUS FISTULA, ACQUIRED: ICD-10-CM

## 2019-01-01 DIAGNOSIS — R40.0 SOMNOLENCE: ICD-10-CM

## 2019-01-01 DIAGNOSIS — B96.20 UNSPECIFIED ESCHERICHIA COLI [E. COLI] AS THE CAUSE OF DISEASES CLASSIFIED ELSEWHERE: ICD-10-CM

## 2019-01-01 DIAGNOSIS — M54.9 DORSALGIA, UNSPECIFIED: ICD-10-CM

## 2019-01-01 DIAGNOSIS — R25.2 CRAMP AND SPASM: ICD-10-CM

## 2019-01-01 DIAGNOSIS — B37.9 CANDIDIASIS, UNSPECIFIED: ICD-10-CM

## 2019-01-01 DIAGNOSIS — I73.9 PERIPHERAL VASCULAR DISEASE, UNSPECIFIED: ICD-10-CM

## 2019-01-01 DIAGNOSIS — Z86.79 PERSONAL HISTORY OF OTHER DISEASES OF THE CIRCULATORY SYSTEM: ICD-10-CM

## 2019-01-01 DIAGNOSIS — Z86.73 PERSONAL HISTORY OF TRANSIENT ISCHEMIC ATTACK (TIA), AND CEREBRAL INFARCTION WITHOUT RESIDUAL DEFICITS: ICD-10-CM

## 2019-01-01 DIAGNOSIS — N17.9 ACUTE KIDNEY FAILURE, UNSPECIFIED: ICD-10-CM

## 2019-01-01 DIAGNOSIS — E78.5 HYPERLIPIDEMIA, UNSPECIFIED: ICD-10-CM

## 2019-01-01 DIAGNOSIS — R10.9 UNSPECIFIED ABDOMINAL PAIN: ICD-10-CM

## 2019-01-01 DIAGNOSIS — T83.511D INFECTION AND INFLAMMATORY REACTION DUE TO INDWELLING URETHRAL CATHETER, SUBSEQUENT ENCOUNTER: ICD-10-CM

## 2019-01-01 DIAGNOSIS — K92.0 HEMATEMESIS: ICD-10-CM

## 2019-01-01 DIAGNOSIS — T86.840 CORNEAL TRANSPLANT REJECTION: ICD-10-CM

## 2019-01-01 DIAGNOSIS — N30.90 CYSTITIS, UNSPECIFIED WITHOUT HEMATURIA: ICD-10-CM

## 2019-01-01 DIAGNOSIS — M81.0 AGE-RELATED OSTEOPOROSIS WITHOUT CURRENT PATHOLOGICAL FRACTURE: ICD-10-CM

## 2019-01-01 DIAGNOSIS — R82.90 UNSPECIFIED ABNORMAL FINDINGS IN URINE: ICD-10-CM

## 2019-01-01 DIAGNOSIS — T19.0XXD: ICD-10-CM

## 2019-01-01 DIAGNOSIS — E44.0 MODERATE PROTEIN-CALORIE MALNUTRITION: ICD-10-CM

## 2019-01-01 DIAGNOSIS — J96.01 ACUTE RESPIRATORY FAILURE WITH HYPOXIA: ICD-10-CM

## 2019-01-01 DIAGNOSIS — I71.00 DISSECTION OF UNSPECIFIED SITE OF AORTA: ICD-10-CM

## 2019-01-01 DIAGNOSIS — L89.159 PRESSURE ULCER OF SACRAL REGION, UNSPECIFIED STAGE: ICD-10-CM

## 2019-01-01 DIAGNOSIS — B00.53 HERPESVIRAL CONJUNCTIVITIS: ICD-10-CM

## 2019-01-01 DIAGNOSIS — Z87.440 PERSONAL HISTORY OF URINARY (TRACT) INFECTIONS: ICD-10-CM

## 2019-01-01 DIAGNOSIS — N20.0 CALCULUS OF KIDNEY: ICD-10-CM

## 2019-01-01 DIAGNOSIS — R63.8 OTHER SYMPTOMS AND SIGNS CONCERNING FOOD AND FLUID INTAKE: ICD-10-CM

## 2019-01-01 DIAGNOSIS — M54.5 LOW BACK PAIN: ICD-10-CM

## 2019-01-01 DIAGNOSIS — G62.89 OTHER SPECIFIED POLYNEUROPATHIES: ICD-10-CM

## 2019-01-01 DIAGNOSIS — K44.9 DIAPHRAGMATIC HERNIA WITHOUT OBSTRUCTION OR GANGRENE: ICD-10-CM

## 2019-01-01 DIAGNOSIS — G57.93 UNSPECIFIED MONONEUROPATHY OF BILATERAL LOWER LIMBS: ICD-10-CM

## 2019-01-01 DIAGNOSIS — Z95.828 PRESENCE OF OTHER VASCULAR IMPLANTS AND GRAFTS: ICD-10-CM

## 2019-01-01 DIAGNOSIS — A49.9 BACTERIAL INFECTION, UNSPECIFIED: ICD-10-CM

## 2019-01-01 DIAGNOSIS — Z78.9 OTHER SPECIFIED HEALTH STATUS: ICD-10-CM

## 2019-01-01 DIAGNOSIS — Z98.890 OTHER SPECIFIED POSTPROCEDURAL STATES: ICD-10-CM

## 2019-01-01 DIAGNOSIS — Z45.49 ENCOUNTER FOR ADJUSTMENT AND MANAGEMENT OF OTHER IMPLANTED NERVOUS SYSTEM DEVICE: ICD-10-CM

## 2019-01-01 DIAGNOSIS — E46 UNSPECIFIED PROTEIN-CALORIE MALNUTRITION: ICD-10-CM

## 2019-01-01 DIAGNOSIS — K59.00 CONSTIPATION, UNSPECIFIED: ICD-10-CM

## 2019-01-01 DIAGNOSIS — Z95.820 PERIPHERAL VASCULAR ANGIOPLASTY STATUS WITH IMPLANTS AND GRAFTS: ICD-10-CM

## 2019-01-01 DIAGNOSIS — I99.8 OTHER DISORDER OF CIRCULATORY SYSTEM: ICD-10-CM

## 2019-01-01 DIAGNOSIS — R09.89 OTHER SPECIFIED SYMPTOMS AND SIGNS INVOLVING THE CIRCULATORY AND RESPIRATORY SYSTEMS: ICD-10-CM

## 2019-01-01 DIAGNOSIS — M54.89 OTHER DORSALGIA: ICD-10-CM

## 2019-01-01 DIAGNOSIS — G89.4 CHRONIC PAIN SYNDROME: ICD-10-CM

## 2019-01-01 DIAGNOSIS — H16.9 UNSPECIFIED KERATITIS: ICD-10-CM

## 2019-01-01 DIAGNOSIS — Z99.3 DEPENDENCE ON WHEELCHAIR: ICD-10-CM

## 2019-01-01 DIAGNOSIS — H54.40 BLINDNESS, ONE EYE, UNSPECIFIED EYE: ICD-10-CM

## 2019-01-01 DIAGNOSIS — Q06.8 OTHER SPECIFIED CONGENITAL MALFORMATIONS OF SPINAL CORD: ICD-10-CM

## 2019-01-01 DIAGNOSIS — K31.811 ANGIODYSPLASIA OF STOMACH AND DUODENUM WITH BLEEDING: ICD-10-CM

## 2019-01-01 DIAGNOSIS — H10.9 UNSPECIFIED CONJUNCTIVITIS: ICD-10-CM

## 2019-01-01 DIAGNOSIS — R00.0 TACHYCARDIA, UNSPECIFIED: ICD-10-CM

## 2019-01-01 LAB
-  AMIKACIN: SIGNIFICANT CHANGE UP
-  AMPICILLIN/SULBACTAM: SIGNIFICANT CHANGE UP
-  AMPICILLIN: SIGNIFICANT CHANGE UP
-  AZTREONAM: SIGNIFICANT CHANGE UP
-  CEFAZOLIN: SIGNIFICANT CHANGE UP
-  CEFEPIME: SIGNIFICANT CHANGE UP
-  CEFOXITIN: SIGNIFICANT CHANGE UP
-  CEFTAZIDIME: SIGNIFICANT CHANGE UP
-  CEFTRIAXONE: SIGNIFICANT CHANGE UP
-  CIPROFLOXACIN: SIGNIFICANT CHANGE UP
-  CLINDAMYCIN: SIGNIFICANT CHANGE UP
-  COAGULASE NEGATIVE STAPHYLOCOCCUS: SIGNIFICANT CHANGE UP
-  ERTAPENEM: SIGNIFICANT CHANGE UP
-  ERYTHROMYCIN: SIGNIFICANT CHANGE UP
-  GENTAMICIN: SIGNIFICANT CHANGE UP
-  IMIPENEM: SIGNIFICANT CHANGE UP
-  LEVOFLOXACIN: SIGNIFICANT CHANGE UP
-  MEROPENEM: SIGNIFICANT CHANGE UP
-  NITROFURANTOIN: SIGNIFICANT CHANGE UP
-  OXACILLIN: SIGNIFICANT CHANGE UP
-  PENICILLIN: SIGNIFICANT CHANGE UP
-  PIPERACILLIN/TAZOBACTAM: SIGNIFICANT CHANGE UP
-  RIFAMPIN: SIGNIFICANT CHANGE UP
-  TETRACYCLINE: SIGNIFICANT CHANGE UP
-  TIGECYCLINE: SIGNIFICANT CHANGE UP
-  TOBRAMYCIN: SIGNIFICANT CHANGE UP
-  TRIMETHOPRIM/SULFAMETHOXAZOLE: SIGNIFICANT CHANGE UP
-  VANCOMYCIN: SIGNIFICANT CHANGE UP
ADD ON TEST-SPECIMEN IN LAB: SIGNIFICANT CHANGE UP
ALBUMIN SERPL ELPH-MCNC: 1.9 G/DL — LOW (ref 3.3–5)
ALBUMIN SERPL ELPH-MCNC: 2 G/DL — LOW (ref 3.3–5)
ALBUMIN SERPL ELPH-MCNC: 2 G/DL — LOW (ref 3.3–5)
ALBUMIN SERPL ELPH-MCNC: 2.1 G/DL — LOW (ref 3.3–5)
ALBUMIN SERPL ELPH-MCNC: 2.2 G/DL — LOW (ref 3.3–5)
ALBUMIN SERPL ELPH-MCNC: 2.2 G/DL — LOW (ref 3.3–5)
ALBUMIN SERPL ELPH-MCNC: 2.3 G/DL — LOW (ref 3.3–5)
ALBUMIN SERPL ELPH-MCNC: 2.4 G/DL — LOW (ref 3.3–5)
ALBUMIN SERPL ELPH-MCNC: 2.5 G/DL — LOW (ref 3.3–5)
ALBUMIN SERPL ELPH-MCNC: 2.6 G/DL — LOW (ref 3.3–5)
ALBUMIN SERPL ELPH-MCNC: 2.7 G/DL — LOW (ref 3.3–5)
ALBUMIN SERPL ELPH-MCNC: 2.8 G/DL — LOW (ref 3.3–5)
ALBUMIN SERPL ELPH-MCNC: 2.8 G/DL — LOW (ref 3.3–5)
ALBUMIN SERPL ELPH-MCNC: 2.9 G/DL — LOW (ref 3.3–5)
ALBUMIN SERPL ELPH-MCNC: 3 G/DL — LOW (ref 3.3–5)
ALBUMIN SERPL ELPH-MCNC: 3.2 G/DL — LOW (ref 3.3–5)
ALP SERPL-CCNC: 100 U/L — SIGNIFICANT CHANGE UP (ref 40–120)
ALP SERPL-CCNC: 121 U/L — HIGH (ref 40–120)
ALP SERPL-CCNC: 161 U/L — HIGH (ref 40–120)
ALP SERPL-CCNC: 46 U/L — SIGNIFICANT CHANGE UP (ref 40–120)
ALP SERPL-CCNC: 60 U/L — SIGNIFICANT CHANGE UP (ref 40–120)
ALP SERPL-CCNC: 60 U/L — SIGNIFICANT CHANGE UP (ref 40–120)
ALP SERPL-CCNC: 62 U/L — SIGNIFICANT CHANGE UP (ref 40–120)
ALP SERPL-CCNC: 64 U/L — SIGNIFICANT CHANGE UP (ref 40–120)
ALP SERPL-CCNC: 65 U/L — SIGNIFICANT CHANGE UP (ref 40–120)
ALP SERPL-CCNC: 65 U/L — SIGNIFICANT CHANGE UP (ref 40–120)
ALP SERPL-CCNC: 66 U/L — SIGNIFICANT CHANGE UP (ref 40–120)
ALP SERPL-CCNC: 66 U/L — SIGNIFICANT CHANGE UP (ref 40–120)
ALP SERPL-CCNC: 67 U/L — SIGNIFICANT CHANGE UP (ref 40–120)
ALP SERPL-CCNC: 68 U/L — SIGNIFICANT CHANGE UP (ref 40–120)
ALP SERPL-CCNC: 69 U/L — SIGNIFICANT CHANGE UP (ref 40–120)
ALP SERPL-CCNC: 71 U/L — SIGNIFICANT CHANGE UP (ref 40–120)
ALP SERPL-CCNC: 71 U/L — SIGNIFICANT CHANGE UP (ref 40–120)
ALP SERPL-CCNC: 72 U/L — SIGNIFICANT CHANGE UP (ref 40–120)
ALP SERPL-CCNC: 73 U/L — SIGNIFICANT CHANGE UP (ref 40–120)
ALP SERPL-CCNC: 75 U/L — SIGNIFICANT CHANGE UP (ref 40–120)
ALP SERPL-CCNC: 77 U/L — SIGNIFICANT CHANGE UP (ref 40–120)
ALP SERPL-CCNC: 78 U/L — SIGNIFICANT CHANGE UP (ref 40–120)
ALP SERPL-CCNC: 78 U/L — SIGNIFICANT CHANGE UP (ref 40–120)
ALP SERPL-CCNC: 80 U/L — SIGNIFICANT CHANGE UP (ref 40–120)
ALP SERPL-CCNC: 81 U/L — SIGNIFICANT CHANGE UP (ref 40–120)
ALP SERPL-CCNC: 82 U/L — SIGNIFICANT CHANGE UP (ref 40–120)
ALP SERPL-CCNC: 83 U/L — SIGNIFICANT CHANGE UP (ref 40–120)
ALP SERPL-CCNC: 84 U/L — SIGNIFICANT CHANGE UP (ref 40–120)
ALP SERPL-CCNC: 87 U/L — SIGNIFICANT CHANGE UP (ref 40–120)
ALP SERPL-CCNC: 88 U/L — SIGNIFICANT CHANGE UP (ref 40–120)
ALP SERPL-CCNC: 89 U/L — SIGNIFICANT CHANGE UP (ref 40–120)
ALT FLD-CCNC: 10 U/L — LOW (ref 12–78)
ALT FLD-CCNC: 10 U/L — SIGNIFICANT CHANGE UP (ref 10–45)
ALT FLD-CCNC: 11 U/L — LOW (ref 12–78)
ALT FLD-CCNC: 11 U/L — LOW (ref 12–78)
ALT FLD-CCNC: 11 U/L — SIGNIFICANT CHANGE UP (ref 10–45)
ALT FLD-CCNC: 12 U/L — SIGNIFICANT CHANGE UP (ref 10–45)
ALT FLD-CCNC: 13 U/L — SIGNIFICANT CHANGE UP (ref 10–45)
ALT FLD-CCNC: 14 U/L — SIGNIFICANT CHANGE UP (ref 12–78)
ALT FLD-CCNC: 15 U/L — SIGNIFICANT CHANGE UP (ref 10–45)
ALT FLD-CCNC: 16 U/L — SIGNIFICANT CHANGE UP (ref 10–45)
ALT FLD-CCNC: 18 U/L — SIGNIFICANT CHANGE UP (ref 12–78)
ALT FLD-CCNC: 19 U/L — SIGNIFICANT CHANGE UP (ref 12–78)
ALT FLD-CCNC: 21 U/L — SIGNIFICANT CHANGE UP (ref 12–78)
ALT FLD-CCNC: 6 U/L — LOW (ref 10–45)
ALT FLD-CCNC: 7 U/L — LOW (ref 10–45)
ALT FLD-CCNC: 7 U/L — LOW (ref 10–45)
ALT FLD-CCNC: 8 U/L — LOW (ref 10–45)
ALT FLD-CCNC: 9 U/L — LOW (ref 10–45)
ALT FLD-CCNC: 9 U/L — LOW (ref 12–78)
ALT FLD-CCNC: SIGNIFICANT CHANGE UP (ref 10–45)
AMYLASE P1 CFR SERPL: 56 U/L — SIGNIFICANT CHANGE UP (ref 25–125)
ANION GAP SERPL CALC-SCNC: 10 MMOL/L — SIGNIFICANT CHANGE UP (ref 5–17)
ANION GAP SERPL CALC-SCNC: 11 MMOL/L — SIGNIFICANT CHANGE UP (ref 5–17)
ANION GAP SERPL CALC-SCNC: 12 MMOL/L — SIGNIFICANT CHANGE UP (ref 5–17)
ANION GAP SERPL CALC-SCNC: 13 MMOL/L — SIGNIFICANT CHANGE UP (ref 5–17)
ANION GAP SERPL CALC-SCNC: 14 MMOL/L — SIGNIFICANT CHANGE UP (ref 5–17)
ANION GAP SERPL CALC-SCNC: 14 MMOL/L — SIGNIFICANT CHANGE UP (ref 5–17)
ANION GAP SERPL CALC-SCNC: 16 MMOL/L — SIGNIFICANT CHANGE UP (ref 5–17)
ANION GAP SERPL CALC-SCNC: 20 MMOL/L — HIGH (ref 5–17)
ANION GAP SERPL CALC-SCNC: 3 MMOL/L — LOW (ref 5–17)
ANION GAP SERPL CALC-SCNC: 3 MMOL/L — LOW (ref 5–17)
ANION GAP SERPL CALC-SCNC: 4 MMOL/L — LOW (ref 5–17)
ANION GAP SERPL CALC-SCNC: 5 MMOL/L — SIGNIFICANT CHANGE UP (ref 5–17)
ANION GAP SERPL CALC-SCNC: 6 MMOL/L — SIGNIFICANT CHANGE UP (ref 5–17)
ANION GAP SERPL CALC-SCNC: 7 MMOL/L — SIGNIFICANT CHANGE UP (ref 5–17)
ANION GAP SERPL CALC-SCNC: 8 MMOL/L — SIGNIFICANT CHANGE UP (ref 5–17)
ANION GAP SERPL CALC-SCNC: 9 MMOL/L — SIGNIFICANT CHANGE UP (ref 5–17)
ANISOCYTOSIS BLD QL: SLIGHT — SIGNIFICANT CHANGE UP
ANISOCYTOSIS BLD QL: SLIGHT — SIGNIFICANT CHANGE UP
APAP SERPL-MCNC: <2 UG/ML — LOW (ref 10–30)
APPEARANCE UR: ABNORMAL
APPEARANCE UR: CLEAR — SIGNIFICANT CHANGE UP
APTT BLD: 23.5 SEC — LOW (ref 27.5–36.3)
APTT BLD: 25.1 SEC — LOW (ref 27.5–36.3)
APTT BLD: 26.7 SEC — LOW (ref 27.5–36.3)
APTT BLD: 27.2 SEC — LOW (ref 27.5–36.3)
APTT BLD: 27.8 SEC — SIGNIFICANT CHANGE UP (ref 27.5–36.3)
APTT BLD: 28.4 SEC — SIGNIFICANT CHANGE UP (ref 27.5–36.3)
APTT BLD: 30.2 SEC — SIGNIFICANT CHANGE UP (ref 27.5–36.3)
APTT BLD: 30.7 SEC — SIGNIFICANT CHANGE UP (ref 27.5–36.3)
APTT BLD: 31 SEC — SIGNIFICANT CHANGE UP (ref 27.5–36.3)
APTT BLD: 31.3 SEC — SIGNIFICANT CHANGE UP (ref 27.5–36.3)
APTT BLD: 31.4 SEC — SIGNIFICANT CHANGE UP (ref 27.5–36.3)
APTT BLD: 31.5 SEC — SIGNIFICANT CHANGE UP (ref 27.5–36.3)
APTT BLD: 31.7 SEC — SIGNIFICANT CHANGE UP (ref 27.5–36.3)
APTT BLD: 31.8 SEC — SIGNIFICANT CHANGE UP (ref 27.5–36.3)
APTT BLD: 31.8 SEC — SIGNIFICANT CHANGE UP (ref 27.5–36.3)
APTT BLD: 31.9 SEC — SIGNIFICANT CHANGE UP (ref 27.5–36.3)
APTT BLD: 31.9 SEC — SIGNIFICANT CHANGE UP (ref 27.5–36.3)
APTT BLD: 32 SEC — SIGNIFICANT CHANGE UP (ref 27.5–36.3)
APTT BLD: 32.2 SEC — SIGNIFICANT CHANGE UP (ref 27.5–36.3)
APTT BLD: 32.4 SEC — SIGNIFICANT CHANGE UP (ref 27.5–36.3)
APTT BLD: 32.4 SEC — SIGNIFICANT CHANGE UP (ref 27.5–36.3)
APTT BLD: 32.7 SEC — SIGNIFICANT CHANGE UP (ref 27.5–36.3)
APTT BLD: 32.8 SEC — SIGNIFICANT CHANGE UP (ref 27.5–36.3)
APTT BLD: 33.2 SEC — SIGNIFICANT CHANGE UP (ref 27.5–36.3)
APTT BLD: 33.5 SEC — SIGNIFICANT CHANGE UP (ref 27.5–36.3)
APTT BLD: 33.6 SEC — SIGNIFICANT CHANGE UP (ref 27.5–36.3)
APTT BLD: 33.7 SEC — SIGNIFICANT CHANGE UP (ref 27.5–36.3)
APTT BLD: 33.8 SEC — SIGNIFICANT CHANGE UP (ref 27.5–36.3)
APTT BLD: 34 SEC — SIGNIFICANT CHANGE UP (ref 27.5–36.3)
APTT BLD: 34.1 SEC — SIGNIFICANT CHANGE UP (ref 27.5–36.3)
APTT BLD: 35 SEC — SIGNIFICANT CHANGE UP (ref 27.5–36.3)
APTT BLD: 35.3 SEC — SIGNIFICANT CHANGE UP (ref 27.5–36.3)
APTT BLD: 36.4 SEC — HIGH (ref 27.5–36.3)
APTT BLD: 38.5 SEC — HIGH (ref 27.5–36.3)
APTT BLD: 57.4 SEC — HIGH (ref 27.5–36.3)
AST SERPL-CCNC: 10 U/L — SIGNIFICANT CHANGE UP (ref 10–40)
AST SERPL-CCNC: 11 U/L — LOW (ref 15–37)
AST SERPL-CCNC: 11 U/L — SIGNIFICANT CHANGE UP (ref 10–40)
AST SERPL-CCNC: 12 U/L — LOW (ref 15–37)
AST SERPL-CCNC: 12 U/L — SIGNIFICANT CHANGE UP (ref 10–40)
AST SERPL-CCNC: 13 U/L — SIGNIFICANT CHANGE UP (ref 10–40)
AST SERPL-CCNC: 13 U/L — SIGNIFICANT CHANGE UP (ref 10–40)
AST SERPL-CCNC: 14 U/L — SIGNIFICANT CHANGE UP (ref 10–40)
AST SERPL-CCNC: 14 U/L — SIGNIFICANT CHANGE UP (ref 10–40)
AST SERPL-CCNC: 15 U/L — SIGNIFICANT CHANGE UP (ref 10–40)
AST SERPL-CCNC: 16 U/L — SIGNIFICANT CHANGE UP (ref 10–40)
AST SERPL-CCNC: 16 U/L — SIGNIFICANT CHANGE UP (ref 15–37)
AST SERPL-CCNC: 17 U/L — SIGNIFICANT CHANGE UP (ref 10–40)
AST SERPL-CCNC: 18 U/L — SIGNIFICANT CHANGE UP (ref 10–40)
AST SERPL-CCNC: 18 U/L — SIGNIFICANT CHANGE UP (ref 15–37)
AST SERPL-CCNC: 22 U/L — SIGNIFICANT CHANGE UP (ref 10–40)
AST SERPL-CCNC: 26 U/L — SIGNIFICANT CHANGE UP (ref 15–37)
AST SERPL-CCNC: 31 U/L — SIGNIFICANT CHANGE UP (ref 10–40)
AST SERPL-CCNC: 32 U/L — SIGNIFICANT CHANGE UP (ref 10–40)
AST SERPL-CCNC: 37 U/L — SIGNIFICANT CHANGE UP (ref 10–40)
AST SERPL-CCNC: 43 U/L — HIGH (ref 10–40)
AST SERPL-CCNC: 44 U/L — HIGH (ref 10–40)
AST SERPL-CCNC: 47 U/L — HIGH (ref 10–40)
AST SERPL-CCNC: 48 U/L — HIGH (ref 10–40)
AST SERPL-CCNC: 49 U/L — HIGH (ref 10–40)
AST SERPL-CCNC: 8 U/L — LOW (ref 15–37)
AST SERPL-CCNC: 94 U/L — HIGH (ref 10–40)
AST SERPL-CCNC: SIGNIFICANT CHANGE UP (ref 10–40)
BACTERIA # UR AUTO: ABNORMAL
BASE EXCESS BLDA CALC-SCNC: -10.2 MMOL/L — LOW (ref -2–3)
BASE EXCESS BLDA CALC-SCNC: -2.5 MMOL/L — LOW (ref -2–2)
BASE EXCESS BLDA CALC-SCNC: -3 MMOL/L — LOW (ref -2–3)
BASE EXCESS BLDA CALC-SCNC: -3.4 MMOL/L — LOW (ref -2–3)
BASE EXCESS BLDA CALC-SCNC: -3.6 MMOL/L — LOW (ref -2–3)
BASE EXCESS BLDA CALC-SCNC: -4.4 MMOL/L — LOW (ref -2–3)
BASE EXCESS BLDA CALC-SCNC: -4.9 MMOL/L — LOW (ref -2–3)
BASE EXCESS BLDA CALC-SCNC: -6.2 MMOL/L — LOW (ref -2–2)
BASE EXCESS BLDA CALC-SCNC: -6.7 MMOL/L — LOW (ref -2–3)
BASE EXCESS BLDA CALC-SCNC: -7.4 MMOL/L — LOW (ref -2–3)
BASOPHILS # BLD AUTO: 0 K/UL — SIGNIFICANT CHANGE UP (ref 0–0.2)
BASOPHILS # BLD AUTO: 0.03 K/UL — SIGNIFICANT CHANGE UP (ref 0–0.2)
BASOPHILS # BLD AUTO: 0.03 K/UL — SIGNIFICANT CHANGE UP (ref 0–0.2)
BASOPHILS # BLD AUTO: 0.04 K/UL — SIGNIFICANT CHANGE UP (ref 0–0.2)
BASOPHILS # BLD AUTO: 0.05 K/UL — SIGNIFICANT CHANGE UP (ref 0–0.2)
BASOPHILS # BLD AUTO: 0.06 K/UL — SIGNIFICANT CHANGE UP (ref 0–0.2)
BASOPHILS # BLD AUTO: 0.09 K/UL — SIGNIFICANT CHANGE UP (ref 0–0.2)
BASOPHILS # BLD AUTO: 0.1 K/UL — SIGNIFICANT CHANGE UP (ref 0–0.2)
BASOPHILS NFR BLD AUTO: 0 % — SIGNIFICANT CHANGE UP (ref 0–2)
BASOPHILS NFR BLD AUTO: 0.2 % — SIGNIFICANT CHANGE UP (ref 0–2)
BASOPHILS NFR BLD AUTO: 0.2 % — SIGNIFICANT CHANGE UP (ref 0–2)
BASOPHILS NFR BLD AUTO: 0.3 % — SIGNIFICANT CHANGE UP (ref 0–2)
BASOPHILS NFR BLD AUTO: 0.3 % — SIGNIFICANT CHANGE UP (ref 0–2)
BASOPHILS NFR BLD AUTO: 0.4 % — SIGNIFICANT CHANGE UP (ref 0–2)
BASOPHILS NFR BLD AUTO: 0.5 % — SIGNIFICANT CHANGE UP (ref 0–2)
BASOPHILS NFR BLD AUTO: 0.6 % — SIGNIFICANT CHANGE UP (ref 0–2)
BASOPHILS NFR BLD AUTO: 0.7 % — SIGNIFICANT CHANGE UP (ref 0–2)
BASOPHILS NFR BLD AUTO: 0.8 % — SIGNIFICANT CHANGE UP (ref 0–2)
BASOPHILS NFR BLD AUTO: 1 % — SIGNIFICANT CHANGE UP (ref 0–2)
BASOPHILS NFR BLD AUTO: 1 % — SIGNIFICANT CHANGE UP (ref 0–2)
BASOPHILS NFR BLD AUTO: 1.1 % — SIGNIFICANT CHANGE UP (ref 0–2)
BILIRUB SERPL-MCNC: 0.2 MG/DL — SIGNIFICANT CHANGE UP (ref 0.2–1.2)
BILIRUB SERPL-MCNC: 0.3 MG/DL — SIGNIFICANT CHANGE UP (ref 0.2–1.2)
BILIRUB SERPL-MCNC: 0.4 MG/DL — SIGNIFICANT CHANGE UP (ref 0.2–1.2)
BILIRUB SERPL-MCNC: 0.5 MG/DL — SIGNIFICANT CHANGE UP (ref 0.2–1.2)
BILIRUB SERPL-MCNC: 0.6 MG/DL — SIGNIFICANT CHANGE UP (ref 0.2–1.2)
BILIRUB SERPL-MCNC: 0.7 MG/DL — SIGNIFICANT CHANGE UP (ref 0.2–1.2)
BILIRUB SERPL-MCNC: 0.8 MG/DL — SIGNIFICANT CHANGE UP (ref 0.2–1.2)
BILIRUB SERPL-MCNC: 1.1 MG/DL — SIGNIFICANT CHANGE UP (ref 0.2–1.2)
BILIRUB UR-MCNC: NEGATIVE — SIGNIFICANT CHANGE UP
BLD GP AB SCN SERPL QL: NEGATIVE — SIGNIFICANT CHANGE UP
BLOOD GAS COMMENTS ARTERIAL: SIGNIFICANT CHANGE UP
BLOOD GAS COMMENTS ARTERIAL: SIGNIFICANT CHANGE UP
BUN SERPL-MCNC: 10 MG/DL — SIGNIFICANT CHANGE UP (ref 7–23)
BUN SERPL-MCNC: 11 MG/DL — SIGNIFICANT CHANGE UP (ref 7–23)
BUN SERPL-MCNC: 12 MG/DL — SIGNIFICANT CHANGE UP (ref 7–23)
BUN SERPL-MCNC: 13 MG/DL — SIGNIFICANT CHANGE UP (ref 7–23)
BUN SERPL-MCNC: 14 MG/DL — SIGNIFICANT CHANGE UP (ref 7–23)
BUN SERPL-MCNC: 15 MG/DL — SIGNIFICANT CHANGE UP (ref 7–23)
BUN SERPL-MCNC: 16 MG/DL — SIGNIFICANT CHANGE UP (ref 7–23)
BUN SERPL-MCNC: 17 MG/DL — SIGNIFICANT CHANGE UP (ref 7–23)
BUN SERPL-MCNC: 17 MG/DL — SIGNIFICANT CHANGE UP (ref 7–23)
BUN SERPL-MCNC: 18 MG/DL — SIGNIFICANT CHANGE UP (ref 7–23)
BUN SERPL-MCNC: 19 MG/DL — SIGNIFICANT CHANGE UP (ref 7–23)
BUN SERPL-MCNC: 2 MG/DL — LOW (ref 7–23)
BUN SERPL-MCNC: 20 MG/DL — SIGNIFICANT CHANGE UP (ref 7–23)
BUN SERPL-MCNC: 21 MG/DL — SIGNIFICANT CHANGE UP (ref 7–23)
BUN SERPL-MCNC: 22 MG/DL — SIGNIFICANT CHANGE UP (ref 7–23)
BUN SERPL-MCNC: 23 MG/DL — SIGNIFICANT CHANGE UP (ref 7–23)
BUN SERPL-MCNC: 24 MG/DL — HIGH (ref 7–23)
BUN SERPL-MCNC: 25 MG/DL — HIGH (ref 7–23)
BUN SERPL-MCNC: 26 MG/DL — HIGH (ref 7–23)
BUN SERPL-MCNC: 27 MG/DL — HIGH (ref 7–23)
BUN SERPL-MCNC: 28 MG/DL — HIGH (ref 7–23)
BUN SERPL-MCNC: 29 MG/DL — HIGH (ref 7–23)
BUN SERPL-MCNC: 29 MG/DL — HIGH (ref 7–23)
BUN SERPL-MCNC: 3 MG/DL — LOW (ref 7–23)
BUN SERPL-MCNC: 31 MG/DL — HIGH (ref 7–23)
BUN SERPL-MCNC: 32 MG/DL — HIGH (ref 7–23)
BUN SERPL-MCNC: 35 MG/DL — HIGH (ref 7–23)
BUN SERPL-MCNC: 37 MG/DL — HIGH (ref 7–23)
BUN SERPL-MCNC: 38 MG/DL — HIGH (ref 7–23)
BUN SERPL-MCNC: 4 MG/DL — LOW (ref 7–23)
BUN SERPL-MCNC: 42 MG/DL — HIGH (ref 7–23)
BUN SERPL-MCNC: 43 MG/DL — HIGH (ref 7–23)
BUN SERPL-MCNC: 46 MG/DL — HIGH (ref 7–23)
BUN SERPL-MCNC: 5 MG/DL — LOW (ref 7–23)
BUN SERPL-MCNC: 6 MG/DL — LOW (ref 7–23)
BUN SERPL-MCNC: 7 MG/DL — SIGNIFICANT CHANGE UP (ref 7–23)
BUN SERPL-MCNC: 8 MG/DL — SIGNIFICANT CHANGE UP (ref 7–23)
BUN SERPL-MCNC: 9 MG/DL — SIGNIFICANT CHANGE UP (ref 7–23)
BURR CELLS BLD QL SMEAR: PRESENT — SIGNIFICANT CHANGE UP
CA-I BLDA-SCNC: 0.89 MMOL/L — LOW (ref 1.12–1.3)
CA-I BLDA-SCNC: 1.12 MMOL/L — SIGNIFICANT CHANGE UP (ref 1.12–1.3)
CA-I BLDA-SCNC: 1.13 MMOL/L — SIGNIFICANT CHANGE UP (ref 1.12–1.3)
CA-I BLDA-SCNC: 1.24 MMOL/L — SIGNIFICANT CHANGE UP (ref 1.12–1.3)
CALCIUM SERPL-MCNC: 7 MG/DL — LOW (ref 8.4–10.5)
CALCIUM SERPL-MCNC: 7.4 MG/DL — LOW (ref 8.4–10.5)
CALCIUM SERPL-MCNC: 7.5 MG/DL — LOW (ref 8.4–10.5)
CALCIUM SERPL-MCNC: 7.6 MG/DL — LOW (ref 8.4–10.5)
CALCIUM SERPL-MCNC: 7.6 MG/DL — LOW (ref 8.5–10.1)
CALCIUM SERPL-MCNC: 7.7 MG/DL — LOW (ref 8.5–10.1)
CALCIUM SERPL-MCNC: 7.8 MG/DL — LOW (ref 8.4–10.5)
CALCIUM SERPL-MCNC: 7.8 MG/DL — LOW (ref 8.5–10.1)
CALCIUM SERPL-MCNC: 7.9 MG/DL — LOW (ref 8.4–10.5)
CALCIUM SERPL-MCNC: 7.9 MG/DL — LOW (ref 8.5–10.1)
CALCIUM SERPL-MCNC: 7.9 MG/DL — LOW (ref 8.5–10.1)
CALCIUM SERPL-MCNC: 8 MG/DL — LOW (ref 8.4–10.5)
CALCIUM SERPL-MCNC: 8 MG/DL — LOW (ref 8.5–10.1)
CALCIUM SERPL-MCNC: 8 MG/DL — LOW (ref 8.5–10.1)
CALCIUM SERPL-MCNC: 8.1 MG/DL — LOW (ref 8.4–10.5)
CALCIUM SERPL-MCNC: 8.1 MG/DL — LOW (ref 8.5–10.1)
CALCIUM SERPL-MCNC: 8.2 MG/DL — LOW (ref 8.4–10.5)
CALCIUM SERPL-MCNC: 8.2 MG/DL — LOW (ref 8.5–10.1)
CALCIUM SERPL-MCNC: 8.3 MG/DL — LOW (ref 8.4–10.5)
CALCIUM SERPL-MCNC: 8.3 MG/DL — LOW (ref 8.5–10.1)
CALCIUM SERPL-MCNC: 8.4 MG/DL — LOW (ref 8.5–10.1)
CALCIUM SERPL-MCNC: 8.4 MG/DL — SIGNIFICANT CHANGE UP (ref 8.4–10.5)
CALCIUM SERPL-MCNC: 8.5 MG/DL — SIGNIFICANT CHANGE UP (ref 8.4–10.5)
CALCIUM SERPL-MCNC: 8.5 MG/DL — SIGNIFICANT CHANGE UP (ref 8.5–10.1)
CALCIUM SERPL-MCNC: 8.6 MG/DL — SIGNIFICANT CHANGE UP (ref 8.4–10.5)
CALCIUM SERPL-MCNC: 8.6 MG/DL — SIGNIFICANT CHANGE UP (ref 8.5–10.1)
CALCIUM SERPL-MCNC: 8.6 MG/DL — SIGNIFICANT CHANGE UP (ref 8.5–10.1)
CALCIUM SERPL-MCNC: 8.7 MG/DL — SIGNIFICANT CHANGE UP (ref 8.4–10.5)
CALCIUM SERPL-MCNC: 8.7 MG/DL — SIGNIFICANT CHANGE UP (ref 8.5–10.1)
CALCIUM SERPL-MCNC: 8.8 MG/DL — SIGNIFICANT CHANGE UP (ref 8.4–10.5)
CALCIUM SERPL-MCNC: 8.8 MG/DL — SIGNIFICANT CHANGE UP (ref 8.4–10.5)
CALCIUM SERPL-MCNC: 8.8 MG/DL — SIGNIFICANT CHANGE UP (ref 8.5–10.1)
CALCIUM SERPL-MCNC: 8.9 MG/DL — SIGNIFICANT CHANGE UP (ref 8.4–10.5)
CALCIUM SERPL-MCNC: 8.9 MG/DL — SIGNIFICANT CHANGE UP (ref 8.4–10.5)
CALCIUM SERPL-MCNC: 8.9 MG/DL — SIGNIFICANT CHANGE UP (ref 8.5–10.1)
CALCIUM SERPL-MCNC: 8.9 MG/DL — SIGNIFICANT CHANGE UP (ref 8.5–10.1)
CALCIUM SERPL-MCNC: 9 MG/DL — SIGNIFICANT CHANGE UP (ref 8.4–10.5)
CALCIUM SERPL-MCNC: 9.1 MG/DL — SIGNIFICANT CHANGE UP (ref 8.5–10.1)
CALCIUM SERPL-MCNC: 9.1 MG/DL — SIGNIFICANT CHANGE UP (ref 8.5–10.1)
CALCIUM SERPL-MCNC: 9.2 MG/DL — SIGNIFICANT CHANGE UP (ref 8.4–10.5)
CALCIUM SERPL-MCNC: 9.3 MG/DL — SIGNIFICANT CHANGE UP (ref 8.4–10.5)
CALCIUM SERPL-MCNC: 9.3 MG/DL — SIGNIFICANT CHANGE UP (ref 8.4–10.5)
CHLORIDE SERPL-SCNC: 100 MMOL/L — SIGNIFICANT CHANGE UP (ref 96–108)
CHLORIDE SERPL-SCNC: 101 MMOL/L — SIGNIFICANT CHANGE UP (ref 96–108)
CHLORIDE SERPL-SCNC: 102 MMOL/L — SIGNIFICANT CHANGE UP (ref 96–108)
CHLORIDE SERPL-SCNC: 102 MMOL/L — SIGNIFICANT CHANGE UP (ref 96–108)
CHLORIDE SERPL-SCNC: 104 MMOL/L — SIGNIFICANT CHANGE UP (ref 96–108)
CHLORIDE SERPL-SCNC: 105 MMOL/L — SIGNIFICANT CHANGE UP (ref 96–108)
CHLORIDE SERPL-SCNC: 106 MMOL/L — SIGNIFICANT CHANGE UP (ref 96–108)
CHLORIDE SERPL-SCNC: 107 MMOL/L — SIGNIFICANT CHANGE UP (ref 96–108)
CHLORIDE SERPL-SCNC: 108 MMOL/L — SIGNIFICANT CHANGE UP (ref 96–108)
CHLORIDE SERPL-SCNC: 109 MMOL/L — HIGH (ref 96–108)
CHLORIDE SERPL-SCNC: 110 MMOL/L — HIGH (ref 96–108)
CHLORIDE SERPL-SCNC: 111 MMOL/L — HIGH (ref 96–108)
CHLORIDE SERPL-SCNC: 112 MMOL/L — HIGH (ref 96–108)
CHLORIDE SERPL-SCNC: 113 MMOL/L — HIGH (ref 96–108)
CHLORIDE SERPL-SCNC: 114 MMOL/L — HIGH (ref 96–108)
CHLORIDE SERPL-SCNC: 115 MMOL/L — HIGH (ref 96–108)
CHLORIDE SERPL-SCNC: 116 MMOL/L — HIGH (ref 96–108)
CHLORIDE SERPL-SCNC: 116 MMOL/L — HIGH (ref 96–108)
CHLORIDE SERPL-SCNC: 118 MMOL/L — HIGH (ref 96–108)
CHLORIDE SERPL-SCNC: 120 MMOL/L — HIGH (ref 96–108)
CHLORIDE SERPL-SCNC: 120 MMOL/L — HIGH (ref 96–108)
CHLORIDE SERPL-SCNC: 99 MMOL/L — SIGNIFICANT CHANGE UP (ref 96–108)
CHOLEST SERPL-MCNC: 121 MG/DL — SIGNIFICANT CHANGE UP (ref 10–199)
CHOLEST SERPL-MCNC: 130 MG/DL — SIGNIFICANT CHANGE UP (ref 10–199)
CK MB BLD-MCNC: 11 % — HIGH (ref 0–3.5)
CK MB CFR SERPL CALC: 1.9 NG/ML — SIGNIFICANT CHANGE UP (ref 0–6.7)
CK MB CFR SERPL CALC: 2.3 NG/ML — SIGNIFICANT CHANGE UP (ref 0–3.8)
CK MB CFR SERPL CALC: 3 NG/ML — SIGNIFICANT CHANGE UP (ref 0–3.8)
CK MB CFR SERPL CALC: 3.3 NG/ML — SIGNIFICANT CHANGE UP (ref 0–3.8)
CK SERPL-CCNC: 13 U/L — LOW (ref 25–170)
CK SERPL-CCNC: 19 U/L — LOW (ref 25–170)
CK SERPL-CCNC: 23 U/L — LOW (ref 25–170)
CK SERPL-CCNC: 23 U/L — LOW (ref 26–192)
CK SERPL-CCNC: 30 U/L — SIGNIFICANT CHANGE UP (ref 25–170)
CK SERPL-CCNC: 36 U/L — SIGNIFICANT CHANGE UP (ref 25–170)
CK SERPL-CCNC: 46 U/L — SIGNIFICANT CHANGE UP (ref 25–170)
CLOSURE TME COLL+EPINEP BLD: 142 K/UL — LOW (ref 150–400)
CO2 SERPL-SCNC: 14 MMOL/L — LOW (ref 22–31)
CO2 SERPL-SCNC: 16 MMOL/L — LOW (ref 22–31)
CO2 SERPL-SCNC: 18 MMOL/L — LOW (ref 22–31)
CO2 SERPL-SCNC: 19 MMOL/L — LOW (ref 22–31)
CO2 SERPL-SCNC: 20 MMOL/L — LOW (ref 22–31)
CO2 SERPL-SCNC: 21 MMOL/L — LOW (ref 22–31)
CO2 SERPL-SCNC: 22 MMOL/L — SIGNIFICANT CHANGE UP (ref 22–31)
CO2 SERPL-SCNC: 23 MMOL/L — SIGNIFICANT CHANGE UP (ref 22–31)
CO2 SERPL-SCNC: 24 MMOL/L — SIGNIFICANT CHANGE UP (ref 22–31)
CO2 SERPL-SCNC: 25 MMOL/L — SIGNIFICANT CHANGE UP (ref 22–31)
CO2 SERPL-SCNC: 26 MMOL/L — SIGNIFICANT CHANGE UP (ref 22–31)
CO2 SERPL-SCNC: 27 MMOL/L — SIGNIFICANT CHANGE UP (ref 22–31)
CO2 SERPL-SCNC: 28 MMOL/L — SIGNIFICANT CHANGE UP (ref 22–31)
CO2 SERPL-SCNC: 29 MMOL/L — SIGNIFICANT CHANGE UP (ref 22–31)
CO2 SERPL-SCNC: 30 MMOL/L — SIGNIFICANT CHANGE UP (ref 22–31)
CO2 SERPL-SCNC: 31 MMOL/L — SIGNIFICANT CHANGE UP (ref 22–31)
CO2 SERPL-SCNC: 31 MMOL/L — SIGNIFICANT CHANGE UP (ref 22–31)
COHGB MFR BLDA: 0.1 % — SIGNIFICANT CHANGE UP
COHGB MFR BLDA: 0.4 % — SIGNIFICANT CHANGE UP
COHGB MFR BLDA: 0.5 % — SIGNIFICANT CHANGE UP
COHGB MFR BLDA: 0.6 % — SIGNIFICANT CHANGE UP
COLOR SPEC: SIGNIFICANT CHANGE UP
COLOR SPEC: YELLOW — SIGNIFICANT CHANGE UP
CREAT SERPL-MCNC: 0.23 MG/DL — LOW (ref 0.5–1.3)
CREAT SERPL-MCNC: 0.25 MG/DL — LOW (ref 0.5–1.3)
CREAT SERPL-MCNC: 0.28 MG/DL — LOW (ref 0.5–1.3)
CREAT SERPL-MCNC: 0.28 MG/DL — LOW (ref 0.5–1.3)
CREAT SERPL-MCNC: 0.29 MG/DL — LOW (ref 0.5–1.3)
CREAT SERPL-MCNC: 0.3 MG/DL — LOW (ref 0.5–1.3)
CREAT SERPL-MCNC: 0.3 MG/DL — LOW (ref 0.5–1.3)
CREAT SERPL-MCNC: 0.31 MG/DL — LOW (ref 0.5–1.3)
CREAT SERPL-MCNC: 0.32 MG/DL — LOW (ref 0.5–1.3)
CREAT SERPL-MCNC: 0.33 MG/DL — LOW (ref 0.5–1.3)
CREAT SERPL-MCNC: 0.34 MG/DL — LOW (ref 0.5–1.3)
CREAT SERPL-MCNC: 0.35 MG/DL — LOW (ref 0.5–1.3)
CREAT SERPL-MCNC: 0.36 MG/DL — LOW (ref 0.5–1.3)
CREAT SERPL-MCNC: 0.36 MG/DL — LOW (ref 0.5–1.3)
CREAT SERPL-MCNC: 0.37 MG/DL — LOW (ref 0.5–1.3)
CREAT SERPL-MCNC: 0.37 MG/DL — LOW (ref 0.5–1.3)
CREAT SERPL-MCNC: 0.38 MG/DL — LOW (ref 0.5–1.3)
CREAT SERPL-MCNC: 0.38 MG/DL — LOW (ref 0.5–1.3)
CREAT SERPL-MCNC: 0.39 MG/DL — LOW (ref 0.5–1.3)
CREAT SERPL-MCNC: 0.39 MG/DL — LOW (ref 0.5–1.3)
CREAT SERPL-MCNC: 0.4 MG/DL — LOW (ref 0.5–1.3)
CREAT SERPL-MCNC: 0.41 MG/DL — LOW (ref 0.5–1.3)
CREAT SERPL-MCNC: 0.42 MG/DL — LOW (ref 0.5–1.3)
CREAT SERPL-MCNC: 0.43 MG/DL — LOW (ref 0.5–1.3)
CREAT SERPL-MCNC: 0.44 MG/DL — LOW (ref 0.5–1.3)
CREAT SERPL-MCNC: 0.45 MG/DL — LOW (ref 0.5–1.3)
CREAT SERPL-MCNC: 0.45 MG/DL — LOW (ref 0.5–1.3)
CREAT SERPL-MCNC: 0.46 MG/DL — LOW (ref 0.5–1.3)
CREAT SERPL-MCNC: 0.47 MG/DL — LOW (ref 0.5–1.3)
CREAT SERPL-MCNC: 0.47 MG/DL — LOW (ref 0.5–1.3)
CREAT SERPL-MCNC: 0.48 MG/DL — LOW (ref 0.5–1.3)
CREAT SERPL-MCNC: 0.49 MG/DL — LOW (ref 0.5–1.3)
CREAT SERPL-MCNC: 0.5 MG/DL — SIGNIFICANT CHANGE UP (ref 0.5–1.3)
CREAT SERPL-MCNC: 0.5 MG/DL — SIGNIFICANT CHANGE UP (ref 0.5–1.3)
CREAT SERPL-MCNC: 0.51 MG/DL — SIGNIFICANT CHANGE UP (ref 0.5–1.3)
CREAT SERPL-MCNC: 0.51 MG/DL — SIGNIFICANT CHANGE UP (ref 0.5–1.3)
CREAT SERPL-MCNC: 0.52 MG/DL — SIGNIFICANT CHANGE UP (ref 0.5–1.3)
CREAT SERPL-MCNC: 0.52 MG/DL — SIGNIFICANT CHANGE UP (ref 0.5–1.3)
CREAT SERPL-MCNC: 0.53 MG/DL — SIGNIFICANT CHANGE UP (ref 0.5–1.3)
CREAT SERPL-MCNC: 0.54 MG/DL — SIGNIFICANT CHANGE UP (ref 0.5–1.3)
CREAT SERPL-MCNC: 0.55 MG/DL — SIGNIFICANT CHANGE UP (ref 0.5–1.3)
CREAT SERPL-MCNC: 0.55 MG/DL — SIGNIFICANT CHANGE UP (ref 0.5–1.3)
CREAT SERPL-MCNC: 0.56 MG/DL — SIGNIFICANT CHANGE UP (ref 0.5–1.3)
CREAT SERPL-MCNC: 0.57 MG/DL — SIGNIFICANT CHANGE UP (ref 0.5–1.3)
CREAT SERPL-MCNC: 0.59 MG/DL — SIGNIFICANT CHANGE UP (ref 0.5–1.3)
CREAT SERPL-MCNC: 0.6 MG/DL — SIGNIFICANT CHANGE UP (ref 0.5–1.3)
CREAT SERPL-MCNC: 0.61 MG/DL — SIGNIFICANT CHANGE UP (ref 0.5–1.3)
CREAT SERPL-MCNC: 0.62 MG/DL — SIGNIFICANT CHANGE UP (ref 0.5–1.3)
CREAT SERPL-MCNC: 0.63 MG/DL — SIGNIFICANT CHANGE UP (ref 0.5–1.3)
CREAT SERPL-MCNC: 0.63 MG/DL — SIGNIFICANT CHANGE UP (ref 0.5–1.3)
CREAT SERPL-MCNC: 0.65 MG/DL — SIGNIFICANT CHANGE UP (ref 0.5–1.3)
CREAT SERPL-MCNC: 0.67 MG/DL — SIGNIFICANT CHANGE UP (ref 0.5–1.3)
CREAT SERPL-MCNC: 0.68 MG/DL — SIGNIFICANT CHANGE UP (ref 0.5–1.3)
CREAT SERPL-MCNC: 0.69 MG/DL — SIGNIFICANT CHANGE UP (ref 0.5–1.3)
CREAT SERPL-MCNC: 0.7 MG/DL — SIGNIFICANT CHANGE UP (ref 0.5–1.3)
CREAT SERPL-MCNC: 0.76 MG/DL — SIGNIFICANT CHANGE UP (ref 0.5–1.3)
CREAT SERPL-MCNC: 0.77 MG/DL — SIGNIFICANT CHANGE UP (ref 0.5–1.3)
CREAT SERPL-MCNC: 0.82 MG/DL — SIGNIFICANT CHANGE UP (ref 0.5–1.3)
CREAT SERPL-MCNC: 0.83 MG/DL — SIGNIFICANT CHANGE UP (ref 0.5–1.3)
CREAT SERPL-MCNC: 0.99 MG/DL — SIGNIFICANT CHANGE UP (ref 0.5–1.3)
CREAT SERPL-MCNC: 1.18 MG/DL — SIGNIFICANT CHANGE UP (ref 0.5–1.3)
CRP SERPL-MCNC: 1.59 MG/DL — HIGH (ref 0–0.4)
CRP SERPL-MCNC: 2.7 MG/DL — HIGH (ref 0–0.4)
CULTURE RESULTS: NO GROWTH — SIGNIFICANT CHANGE UP
CULTURE RESULTS: SIGNIFICANT CHANGE UP
DIFF PNL FLD: ABNORMAL
DIFF PNL FLD: NEGATIVE — SIGNIFICANT CHANGE UP
DIFF PNL FLD: NEGATIVE — SIGNIFICANT CHANGE UP
ELLIPTOCYTES BLD QL SMEAR: SLIGHT — SIGNIFICANT CHANGE UP
ELLIPTOCYTES BLD QL SMEAR: SLIGHT — SIGNIFICANT CHANGE UP
EOSINOPHIL # BLD AUTO: 0.02 K/UL — SIGNIFICANT CHANGE UP (ref 0–0.5)
EOSINOPHIL # BLD AUTO: 0.03 K/UL — SIGNIFICANT CHANGE UP (ref 0–0.5)
EOSINOPHIL # BLD AUTO: 0.07 K/UL — SIGNIFICANT CHANGE UP (ref 0–0.5)
EOSINOPHIL # BLD AUTO: 0.1 K/UL — SIGNIFICANT CHANGE UP (ref 0–0.5)
EOSINOPHIL # BLD AUTO: 0.18 K/UL — SIGNIFICANT CHANGE UP (ref 0–0.5)
EOSINOPHIL # BLD AUTO: 0.19 K/UL — SIGNIFICANT CHANGE UP (ref 0–0.5)
EOSINOPHIL # BLD AUTO: 0.2 K/UL — SIGNIFICANT CHANGE UP (ref 0–0.5)
EOSINOPHIL # BLD AUTO: 0.24 K/UL — SIGNIFICANT CHANGE UP (ref 0–0.5)
EOSINOPHIL # BLD AUTO: 0.29 K/UL — SIGNIFICANT CHANGE UP (ref 0–0.5)
EOSINOPHIL # BLD AUTO: 0.3 K/UL — SIGNIFICANT CHANGE UP (ref 0–0.5)
EOSINOPHIL # BLD AUTO: 0.4 K/UL — SIGNIFICANT CHANGE UP (ref 0–0.5)
EOSINOPHIL # BLD AUTO: 0.4 K/UL — SIGNIFICANT CHANGE UP (ref 0–0.5)
EOSINOPHIL # BLD AUTO: 0.41 K/UL — SIGNIFICANT CHANGE UP (ref 0–0.5)
EOSINOPHIL # BLD AUTO: 0.44 K/UL — SIGNIFICANT CHANGE UP (ref 0–0.5)
EOSINOPHIL # BLD AUTO: 0.48 K/UL — SIGNIFICANT CHANGE UP (ref 0–0.5)
EOSINOPHIL # BLD AUTO: 0.5 K/UL — SIGNIFICANT CHANGE UP (ref 0–0.5)
EOSINOPHIL # BLD AUTO: 0.51 K/UL — HIGH (ref 0–0.5)
EOSINOPHIL # BLD AUTO: 0.69 K/UL — HIGH (ref 0–0.5)
EOSINOPHIL # BLD AUTO: 0.93 K/UL — HIGH (ref 0–0.5)
EOSINOPHIL # BLD AUTO: 1.69 K/UL — HIGH (ref 0–0.5)
EOSINOPHIL NFR BLD AUTO: 0.1 % — SIGNIFICANT CHANGE UP (ref 0–6)
EOSINOPHIL NFR BLD AUTO: 0.3 % — SIGNIFICANT CHANGE UP (ref 0–6)
EOSINOPHIL NFR BLD AUTO: 0.4 % — SIGNIFICANT CHANGE UP (ref 0–6)
EOSINOPHIL NFR BLD AUTO: 1 % — SIGNIFICANT CHANGE UP (ref 0–6)
EOSINOPHIL NFR BLD AUTO: 1.3 % — SIGNIFICANT CHANGE UP (ref 0–6)
EOSINOPHIL NFR BLD AUTO: 1.7 % — SIGNIFICANT CHANGE UP (ref 0–6)
EOSINOPHIL NFR BLD AUTO: 15.3 % — HIGH (ref 0–6)
EOSINOPHIL NFR BLD AUTO: 2 % — SIGNIFICANT CHANGE UP (ref 0–6)
EOSINOPHIL NFR BLD AUTO: 2.2 % — SIGNIFICANT CHANGE UP (ref 0–6)
EOSINOPHIL NFR BLD AUTO: 2.9 % — SIGNIFICANT CHANGE UP (ref 0–6)
EOSINOPHIL NFR BLD AUTO: 3 % — SIGNIFICANT CHANGE UP (ref 0–6)
EOSINOPHIL NFR BLD AUTO: 3.1 % — SIGNIFICANT CHANGE UP (ref 0–6)
EOSINOPHIL NFR BLD AUTO: 3.2 % — SIGNIFICANT CHANGE UP (ref 0–6)
EOSINOPHIL NFR BLD AUTO: 3.4 % — SIGNIFICANT CHANGE UP (ref 0–6)
EOSINOPHIL NFR BLD AUTO: 3.5 % — SIGNIFICANT CHANGE UP (ref 0–6)
EOSINOPHIL NFR BLD AUTO: 4 % — SIGNIFICANT CHANGE UP (ref 0–6)
EOSINOPHIL NFR BLD AUTO: 4.1 % — SIGNIFICANT CHANGE UP (ref 0–6)
EOSINOPHIL NFR BLD AUTO: 4.3 % — SIGNIFICANT CHANGE UP (ref 0–6)
EOSINOPHIL NFR BLD AUTO: 6.2 % — HIGH (ref 0–6)
EOSINOPHIL NFR BLD AUTO: 7.3 % — HIGH (ref 0–6)
EOSINOPHIL NFR BLD AUTO: 7.7 % — HIGH (ref 0–6)
EPI CELLS # UR: 0 /HPF — SIGNIFICANT CHANGE UP
EPI CELLS # UR: 0 /HPF — SIGNIFICANT CHANGE UP
EPI CELLS # UR: 1 /HPF — SIGNIFICANT CHANGE UP
ERYTHROCYTE [SEDIMENTATION RATE] IN BLOOD: 10 MM/HR — SIGNIFICANT CHANGE UP
ERYTHROCYTE [SEDIMENTATION RATE] IN BLOOD: 11 MM/HR — SIGNIFICANT CHANGE UP
ETHANOL SERPL-MCNC: <10 MG/DL — SIGNIFICANT CHANGE UP (ref 0–10)
FERRITIN SERPL-MCNC: 51 NG/ML — SIGNIFICANT CHANGE UP (ref 15–150)
FIBRINOGEN PPP-MCNC: 148 MG/DL — LOW (ref 258–438)
FIBRINOGEN PPP-MCNC: 189 MG/DL — LOW (ref 258–438)
GAS PNL BLDA: SIGNIFICANT CHANGE UP
GAS PNL BLDV: SIGNIFICANT CHANGE UP
GLUCOSE BLDC GLUCOMTR-MCNC: 101 MG/DL — HIGH (ref 70–99)
GLUCOSE BLDC GLUCOMTR-MCNC: 101 MG/DL — HIGH (ref 70–99)
GLUCOSE BLDC GLUCOMTR-MCNC: 102 MG/DL — HIGH (ref 70–99)
GLUCOSE BLDC GLUCOMTR-MCNC: 102 MG/DL — HIGH (ref 70–99)
GLUCOSE BLDC GLUCOMTR-MCNC: 103 MG/DL — HIGH (ref 70–99)
GLUCOSE BLDC GLUCOMTR-MCNC: 104 MG/DL — HIGH (ref 70–99)
GLUCOSE BLDC GLUCOMTR-MCNC: 105 MG/DL — HIGH (ref 70–99)
GLUCOSE BLDC GLUCOMTR-MCNC: 106 MG/DL — HIGH (ref 70–99)
GLUCOSE BLDC GLUCOMTR-MCNC: 109 MG/DL — HIGH (ref 70–99)
GLUCOSE BLDC GLUCOMTR-MCNC: 119 MG/DL — HIGH (ref 70–99)
GLUCOSE BLDC GLUCOMTR-MCNC: 121 MG/DL — HIGH (ref 70–99)
GLUCOSE BLDC GLUCOMTR-MCNC: 125 MG/DL — HIGH (ref 70–99)
GLUCOSE BLDC GLUCOMTR-MCNC: 136 MG/DL — HIGH (ref 70–99)
GLUCOSE BLDC GLUCOMTR-MCNC: 137 MG/DL — HIGH (ref 70–99)
GLUCOSE BLDC GLUCOMTR-MCNC: 137 MG/DL — HIGH (ref 70–99)
GLUCOSE BLDC GLUCOMTR-MCNC: 143 MG/DL — HIGH (ref 70–99)
GLUCOSE BLDC GLUCOMTR-MCNC: 143 MG/DL — HIGH (ref 70–99)
GLUCOSE BLDC GLUCOMTR-MCNC: 147 MG/DL — HIGH (ref 70–99)
GLUCOSE BLDC GLUCOMTR-MCNC: 53 MG/DL — LOW (ref 70–99)
GLUCOSE BLDC GLUCOMTR-MCNC: 56 MG/DL — LOW (ref 70–99)
GLUCOSE BLDC GLUCOMTR-MCNC: 58 MG/DL — LOW (ref 70–99)
GLUCOSE BLDC GLUCOMTR-MCNC: 62 MG/DL — LOW (ref 70–99)
GLUCOSE BLDC GLUCOMTR-MCNC: 70 MG/DL — SIGNIFICANT CHANGE UP (ref 70–99)
GLUCOSE BLDC GLUCOMTR-MCNC: 70 MG/DL — SIGNIFICANT CHANGE UP (ref 70–99)
GLUCOSE BLDC GLUCOMTR-MCNC: 73 MG/DL — SIGNIFICANT CHANGE UP (ref 70–99)
GLUCOSE BLDC GLUCOMTR-MCNC: 74 MG/DL — SIGNIFICANT CHANGE UP (ref 70–99)
GLUCOSE BLDC GLUCOMTR-MCNC: 74 MG/DL — SIGNIFICANT CHANGE UP (ref 70–99)
GLUCOSE BLDC GLUCOMTR-MCNC: 76 MG/DL — SIGNIFICANT CHANGE UP (ref 70–99)
GLUCOSE BLDC GLUCOMTR-MCNC: 79 MG/DL — SIGNIFICANT CHANGE UP (ref 70–99)
GLUCOSE BLDC GLUCOMTR-MCNC: 80 MG/DL — SIGNIFICANT CHANGE UP (ref 70–99)
GLUCOSE BLDC GLUCOMTR-MCNC: 80 MG/DL — SIGNIFICANT CHANGE UP (ref 70–99)
GLUCOSE BLDC GLUCOMTR-MCNC: 81 MG/DL — SIGNIFICANT CHANGE UP (ref 70–99)
GLUCOSE BLDC GLUCOMTR-MCNC: 82 MG/DL — SIGNIFICANT CHANGE UP (ref 70–99)
GLUCOSE BLDC GLUCOMTR-MCNC: 82 MG/DL — SIGNIFICANT CHANGE UP (ref 70–99)
GLUCOSE BLDC GLUCOMTR-MCNC: 83 MG/DL — SIGNIFICANT CHANGE UP (ref 70–99)
GLUCOSE BLDC GLUCOMTR-MCNC: 84 MG/DL — SIGNIFICANT CHANGE UP (ref 70–99)
GLUCOSE BLDC GLUCOMTR-MCNC: 84 MG/DL — SIGNIFICANT CHANGE UP (ref 70–99)
GLUCOSE BLDC GLUCOMTR-MCNC: 85 MG/DL — SIGNIFICANT CHANGE UP (ref 70–99)
GLUCOSE BLDC GLUCOMTR-MCNC: 86 MG/DL — SIGNIFICANT CHANGE UP (ref 70–99)
GLUCOSE BLDC GLUCOMTR-MCNC: 86 MG/DL — SIGNIFICANT CHANGE UP (ref 70–99)
GLUCOSE BLDC GLUCOMTR-MCNC: 87 MG/DL — SIGNIFICANT CHANGE UP (ref 70–99)
GLUCOSE BLDC GLUCOMTR-MCNC: 87 MG/DL — SIGNIFICANT CHANGE UP (ref 70–99)
GLUCOSE BLDC GLUCOMTR-MCNC: 88 MG/DL — SIGNIFICANT CHANGE UP (ref 70–99)
GLUCOSE BLDC GLUCOMTR-MCNC: 89 MG/DL — SIGNIFICANT CHANGE UP (ref 70–99)
GLUCOSE BLDC GLUCOMTR-MCNC: 91 MG/DL — SIGNIFICANT CHANGE UP (ref 70–99)
GLUCOSE BLDC GLUCOMTR-MCNC: 95 MG/DL — SIGNIFICANT CHANGE UP (ref 70–99)
GLUCOSE BLDC GLUCOMTR-MCNC: 97 MG/DL — SIGNIFICANT CHANGE UP (ref 70–99)
GLUCOSE BLDC GLUCOMTR-MCNC: 97 MG/DL — SIGNIFICANT CHANGE UP (ref 70–99)
GLUCOSE BLDC GLUCOMTR-MCNC: 99 MG/DL — SIGNIFICANT CHANGE UP (ref 70–99)
GLUCOSE SERPL-MCNC: 100 MG/DL — HIGH (ref 70–99)
GLUCOSE SERPL-MCNC: 101 MG/DL — HIGH (ref 70–99)
GLUCOSE SERPL-MCNC: 102 MG/DL — HIGH (ref 70–99)
GLUCOSE SERPL-MCNC: 103 MG/DL — HIGH (ref 70–99)
GLUCOSE SERPL-MCNC: 104 MG/DL — HIGH (ref 70–99)
GLUCOSE SERPL-MCNC: 105 MG/DL — HIGH (ref 70–99)
GLUCOSE SERPL-MCNC: 106 MG/DL — HIGH (ref 70–99)
GLUCOSE SERPL-MCNC: 106 MG/DL — HIGH (ref 70–99)
GLUCOSE SERPL-MCNC: 107 MG/DL — HIGH (ref 70–99)
GLUCOSE SERPL-MCNC: 107 MG/DL — HIGH (ref 70–99)
GLUCOSE SERPL-MCNC: 108 MG/DL — HIGH (ref 70–99)
GLUCOSE SERPL-MCNC: 109 MG/DL — HIGH (ref 70–99)
GLUCOSE SERPL-MCNC: 109 MG/DL — HIGH (ref 70–99)
GLUCOSE SERPL-MCNC: 110 MG/DL — HIGH (ref 70–99)
GLUCOSE SERPL-MCNC: 111 MG/DL — HIGH (ref 70–99)
GLUCOSE SERPL-MCNC: 112 MG/DL — HIGH (ref 70–99)
GLUCOSE SERPL-MCNC: 113 MG/DL — HIGH (ref 70–99)
GLUCOSE SERPL-MCNC: 115 MG/DL — HIGH (ref 70–99)
GLUCOSE SERPL-MCNC: 115 MG/DL — HIGH (ref 70–99)
GLUCOSE SERPL-MCNC: 116 MG/DL — HIGH (ref 70–99)
GLUCOSE SERPL-MCNC: 116 MG/DL — HIGH (ref 70–99)
GLUCOSE SERPL-MCNC: 120 MG/DL — HIGH (ref 70–99)
GLUCOSE SERPL-MCNC: 121 MG/DL — HIGH (ref 70–99)
GLUCOSE SERPL-MCNC: 121 MG/DL — HIGH (ref 70–99)
GLUCOSE SERPL-MCNC: 122 MG/DL — HIGH (ref 70–99)
GLUCOSE SERPL-MCNC: 123 MG/DL — HIGH (ref 70–99)
GLUCOSE SERPL-MCNC: 125 MG/DL — HIGH (ref 70–99)
GLUCOSE SERPL-MCNC: 128 MG/DL — HIGH (ref 70–99)
GLUCOSE SERPL-MCNC: 128 MG/DL — HIGH (ref 70–99)
GLUCOSE SERPL-MCNC: 129 MG/DL — HIGH (ref 70–99)
GLUCOSE SERPL-MCNC: 129 MG/DL — HIGH (ref 70–99)
GLUCOSE SERPL-MCNC: 131 MG/DL — HIGH (ref 70–99)
GLUCOSE SERPL-MCNC: 134 MG/DL — HIGH (ref 70–99)
GLUCOSE SERPL-MCNC: 135 MG/DL — HIGH (ref 70–99)
GLUCOSE SERPL-MCNC: 137 MG/DL — HIGH (ref 70–99)
GLUCOSE SERPL-MCNC: 137 MG/DL — HIGH (ref 70–99)
GLUCOSE SERPL-MCNC: 145 MG/DL — HIGH (ref 70–99)
GLUCOSE SERPL-MCNC: 146 MG/DL — HIGH (ref 70–99)
GLUCOSE SERPL-MCNC: 148 MG/DL — HIGH (ref 70–99)
GLUCOSE SERPL-MCNC: 152 MG/DL — HIGH (ref 70–99)
GLUCOSE SERPL-MCNC: 161 MG/DL — HIGH (ref 70–99)
GLUCOSE SERPL-MCNC: 164 MG/DL — HIGH (ref 70–99)
GLUCOSE SERPL-MCNC: 207 MG/DL — HIGH (ref 70–99)
GLUCOSE SERPL-MCNC: 226 MG/DL — HIGH (ref 70–99)
GLUCOSE SERPL-MCNC: 247 MG/DL — HIGH (ref 70–99)
GLUCOSE SERPL-MCNC: 272 MG/DL — HIGH (ref 70–99)
GLUCOSE SERPL-MCNC: 385 MG/DL — HIGH (ref 70–99)
GLUCOSE SERPL-MCNC: 407 MG/DL — HIGH (ref 70–99)
GLUCOSE SERPL-MCNC: 80 MG/DL — SIGNIFICANT CHANGE UP (ref 70–99)
GLUCOSE SERPL-MCNC: 81 MG/DL — SIGNIFICANT CHANGE UP (ref 70–99)
GLUCOSE SERPL-MCNC: 82 MG/DL — SIGNIFICANT CHANGE UP (ref 70–99)
GLUCOSE SERPL-MCNC: 85 MG/DL — SIGNIFICANT CHANGE UP (ref 70–99)
GLUCOSE SERPL-MCNC: 85 MG/DL — SIGNIFICANT CHANGE UP (ref 70–99)
GLUCOSE SERPL-MCNC: 87 MG/DL — SIGNIFICANT CHANGE UP (ref 70–99)
GLUCOSE SERPL-MCNC: 87 MG/DL — SIGNIFICANT CHANGE UP (ref 70–99)
GLUCOSE SERPL-MCNC: 88 MG/DL — SIGNIFICANT CHANGE UP (ref 70–99)
GLUCOSE SERPL-MCNC: 89 MG/DL — SIGNIFICANT CHANGE UP (ref 70–99)
GLUCOSE SERPL-MCNC: 89 MG/DL — SIGNIFICANT CHANGE UP (ref 70–99)
GLUCOSE SERPL-MCNC: 90 MG/DL — SIGNIFICANT CHANGE UP (ref 70–99)
GLUCOSE SERPL-MCNC: 91 MG/DL — SIGNIFICANT CHANGE UP (ref 70–99)
GLUCOSE SERPL-MCNC: 92 MG/DL — SIGNIFICANT CHANGE UP (ref 70–99)
GLUCOSE SERPL-MCNC: 93 MG/DL — SIGNIFICANT CHANGE UP (ref 70–99)
GLUCOSE SERPL-MCNC: 94 MG/DL — SIGNIFICANT CHANGE UP (ref 70–99)
GLUCOSE SERPL-MCNC: 95 MG/DL — SIGNIFICANT CHANGE UP (ref 70–99)
GLUCOSE SERPL-MCNC: 96 MG/DL — SIGNIFICANT CHANGE UP (ref 70–99)
GLUCOSE SERPL-MCNC: 97 MG/DL — SIGNIFICANT CHANGE UP (ref 70–99)
GLUCOSE SERPL-MCNC: 98 MG/DL — SIGNIFICANT CHANGE UP (ref 70–99)
GLUCOSE SERPL-MCNC: 98 MG/DL — SIGNIFICANT CHANGE UP (ref 70–99)
GLUCOSE SERPL-MCNC: 99 MG/DL — SIGNIFICANT CHANGE UP (ref 70–99)
GLUCOSE UR QL: NEGATIVE MG/DL — SIGNIFICANT CHANGE UP
GLUCOSE UR QL: NEGATIVE — SIGNIFICANT CHANGE UP
GRAM STN FLD: SIGNIFICANT CHANGE UP
GRAM STN FLD: SIGNIFICANT CHANGE UP
HAPTOGLOB SERPL-MCNC: 104 MG/DL — SIGNIFICANT CHANGE UP (ref 34–200)
HBA1C BLD-MCNC: 4.9 % — SIGNIFICANT CHANGE UP (ref 4–5.6)
HCO3 BLDA-SCNC: 18 MMOL/L — LOW (ref 21–28)
HCO3 BLDA-SCNC: 18 MMOL/L — LOW (ref 21–28)
HCO3 BLDA-SCNC: 19 MMOL/L — LOW (ref 21–29)
HCO3 BLDA-SCNC: 20 MMOL/L — LOW (ref 21–28)
HCO3 BLDA-SCNC: 20 MMOL/L — LOW (ref 21–28)
HCO3 BLDA-SCNC: 21 MMOL/L — SIGNIFICANT CHANGE UP (ref 21–28)
HCO3 BLDA-SCNC: 21 MMOL/L — SIGNIFICANT CHANGE UP (ref 21–28)
HCO3 BLDA-SCNC: 22 MMOL/L — SIGNIFICANT CHANGE UP (ref 21–28)
HCO3 BLDA-SCNC: 22 MMOL/L — SIGNIFICANT CHANGE UP (ref 21–28)
HCO3 BLDA-SCNC: 22 MMOL/L — SIGNIFICANT CHANGE UP (ref 21–29)
HCT VFR BLD CALC: 15.2 % — CRITICAL LOW (ref 34.5–45)
HCT VFR BLD CALC: 17.1 % — CRITICAL LOW (ref 34.5–45)
HCT VFR BLD CALC: 18.8 % — CRITICAL LOW (ref 34.5–45)
HCT VFR BLD CALC: 19.5 % — CRITICAL LOW (ref 34.5–45)
HCT VFR BLD CALC: 20.3 % — CRITICAL LOW (ref 34.5–45)
HCT VFR BLD CALC: 20.5 % — CRITICAL LOW (ref 34.5–45)
HCT VFR BLD CALC: 20.7 % — CRITICAL LOW (ref 34.5–45)
HCT VFR BLD CALC: 21.1 % — LOW (ref 34.5–45)
HCT VFR BLD CALC: 21.3 % — LOW (ref 34.5–45)
HCT VFR BLD CALC: 21.4 % — LOW (ref 34.5–45)
HCT VFR BLD CALC: 21.4 % — LOW (ref 34.5–45)
HCT VFR BLD CALC: 21.5 % — LOW (ref 34.5–45)
HCT VFR BLD CALC: 21.6 % — LOW (ref 34.5–45)
HCT VFR BLD CALC: 21.7 % — LOW (ref 34.5–45)
HCT VFR BLD CALC: 21.9 % — LOW (ref 34.5–45)
HCT VFR BLD CALC: 21.9 % — LOW (ref 34.5–45)
HCT VFR BLD CALC: 22 % — LOW (ref 34.5–45)
HCT VFR BLD CALC: 22.2 % — LOW (ref 34.5–45)
HCT VFR BLD CALC: 22.2 % — LOW (ref 34.5–45)
HCT VFR BLD CALC: 22.4 % — LOW (ref 34.5–45)
HCT VFR BLD CALC: 22.4 % — LOW (ref 34.5–45)
HCT VFR BLD CALC: 22.5 % — LOW (ref 34.5–45)
HCT VFR BLD CALC: 22.6 % — LOW (ref 34.5–45)
HCT VFR BLD CALC: 22.6 % — LOW (ref 34.5–45)
HCT VFR BLD CALC: 22.7 % — LOW (ref 34.5–45)
HCT VFR BLD CALC: 22.7 % — LOW (ref 34.5–45)
HCT VFR BLD CALC: 22.8 % — LOW (ref 34.5–45)
HCT VFR BLD CALC: 22.9 % — LOW (ref 34.5–45)
HCT VFR BLD CALC: 22.9 % — LOW (ref 34.5–45)
HCT VFR BLD CALC: 23 % — LOW (ref 34.5–45)
HCT VFR BLD CALC: 23 % — LOW (ref 34.5–45)
HCT VFR BLD CALC: 23.2 % — LOW (ref 34.5–45)
HCT VFR BLD CALC: 23.3 % — LOW (ref 34.5–45)
HCT VFR BLD CALC: 23.4 % — LOW (ref 34.5–45)
HCT VFR BLD CALC: 23.5 % — LOW (ref 34.5–45)
HCT VFR BLD CALC: 23.6 % — LOW (ref 34.5–45)
HCT VFR BLD CALC: 23.7 % — LOW (ref 34.5–45)
HCT VFR BLD CALC: 23.8 % — LOW (ref 34.5–45)
HCT VFR BLD CALC: 23.9 % — LOW (ref 34.5–45)
HCT VFR BLD CALC: 24 % — LOW (ref 34.5–45)
HCT VFR BLD CALC: 24.1 % — LOW (ref 34.5–45)
HCT VFR BLD CALC: 24.1 % — LOW (ref 34.5–45)
HCT VFR BLD CALC: 24.2 % — LOW (ref 34.5–45)
HCT VFR BLD CALC: 24.3 % — LOW (ref 34.5–45)
HCT VFR BLD CALC: 24.3 % — LOW (ref 34.5–45)
HCT VFR BLD CALC: 24.4 % — LOW (ref 34.5–45)
HCT VFR BLD CALC: 24.5 % — LOW (ref 34.5–45)
HCT VFR BLD CALC: 24.6 % — LOW (ref 34.5–45)
HCT VFR BLD CALC: 24.7 % — LOW (ref 34.5–45)
HCT VFR BLD CALC: 24.8 % — LOW (ref 34.5–45)
HCT VFR BLD CALC: 24.9 % — LOW (ref 34.5–45)
HCT VFR BLD CALC: 25 % — LOW (ref 34.5–45)
HCT VFR BLD CALC: 25.1 % — LOW (ref 34.5–45)
HCT VFR BLD CALC: 25.2 % — LOW (ref 34.5–45)
HCT VFR BLD CALC: 25.3 % — LOW (ref 34.5–45)
HCT VFR BLD CALC: 25.4 % — LOW (ref 34.5–45)
HCT VFR BLD CALC: 25.5 % — LOW (ref 34.5–45)
HCT VFR BLD CALC: 25.6 % — LOW (ref 34.5–45)
HCT VFR BLD CALC: 25.7 % — LOW (ref 34.5–45)
HCT VFR BLD CALC: 25.8 % — LOW (ref 34.5–45)
HCT VFR BLD CALC: 25.8 % — LOW (ref 34.5–45)
HCT VFR BLD CALC: 25.9 % — LOW (ref 34.5–45)
HCT VFR BLD CALC: 26 % — LOW (ref 34.5–45)
HCT VFR BLD CALC: 26.1 % — LOW (ref 34.5–45)
HCT VFR BLD CALC: 26.2 % — LOW (ref 34.5–45)
HCT VFR BLD CALC: 26.3 % — LOW (ref 34.5–45)
HCT VFR BLD CALC: 26.4 % — LOW (ref 34.5–45)
HCT VFR BLD CALC: 26.5 % — LOW (ref 34.5–45)
HCT VFR BLD CALC: 26.6 % — LOW (ref 34.5–45)
HCT VFR BLD CALC: 26.7 % — LOW (ref 34.5–45)
HCT VFR BLD CALC: 26.7 % — LOW (ref 34.5–45)
HCT VFR BLD CALC: 26.8 % — LOW (ref 34.5–45)
HCT VFR BLD CALC: 26.9 % — LOW (ref 34.5–45)
HCT VFR BLD CALC: 27 % — LOW (ref 34.5–45)
HCT VFR BLD CALC: 27.1 % — LOW (ref 34.5–45)
HCT VFR BLD CALC: 27.3 % — LOW (ref 34.5–45)
HCT VFR BLD CALC: 27.4 % — LOW (ref 34.5–45)
HCT VFR BLD CALC: 27.5 % — LOW (ref 34.5–45)
HCT VFR BLD CALC: 27.6 % — LOW (ref 34.5–45)
HCT VFR BLD CALC: 27.6 % — LOW (ref 34.5–45)
HCT VFR BLD CALC: 27.7 % — LOW (ref 34.5–45)
HCT VFR BLD CALC: 27.7 % — LOW (ref 34.5–45)
HCT VFR BLD CALC: 27.8 % — LOW (ref 34.5–45)
HCT VFR BLD CALC: 27.8 % — LOW (ref 34.5–45)
HCT VFR BLD CALC: 27.9 % — LOW (ref 34.5–45)
HCT VFR BLD CALC: 28 % — LOW (ref 34.5–45)
HCT VFR BLD CALC: 28 % — LOW (ref 34.5–45)
HCT VFR BLD CALC: 28.2 % — LOW (ref 34.5–45)
HCT VFR BLD CALC: 28.2 % — LOW (ref 34.5–45)
HCT VFR BLD CALC: 28.3 % — LOW (ref 34.5–45)
HCT VFR BLD CALC: 28.4 % — LOW (ref 34.5–45)
HCT VFR BLD CALC: 28.5 % — LOW (ref 34.5–45)
HCT VFR BLD CALC: 28.6 % — LOW (ref 34.5–45)
HCT VFR BLD CALC: 28.8 % — LOW (ref 34.5–45)
HCT VFR BLD CALC: 28.8 % — LOW (ref 34.5–45)
HCT VFR BLD CALC: 28.9 % — LOW (ref 34.5–45)
HCT VFR BLD CALC: 28.9 % — LOW (ref 34.5–45)
HCT VFR BLD CALC: 29 % — LOW (ref 34.5–45)
HCT VFR BLD CALC: 29.1 % — LOW (ref 34.5–45)
HCT VFR BLD CALC: 29.2 % — LOW (ref 34.5–45)
HCT VFR BLD CALC: 29.3 % — LOW (ref 34.5–45)
HCT VFR BLD CALC: 29.4 % — LOW (ref 34.5–45)
HCT VFR BLD CALC: 29.4 % — LOW (ref 34.5–45)
HCT VFR BLD CALC: 29.5 % — LOW (ref 34.5–45)
HCT VFR BLD CALC: 29.5 % — LOW (ref 34.5–45)
HCT VFR BLD CALC: 29.6 % — LOW (ref 34.5–45)
HCT VFR BLD CALC: 29.7 % — LOW (ref 34.5–45)
HCT VFR BLD CALC: 29.7 % — LOW (ref 34.5–45)
HCT VFR BLD CALC: 29.8 % — LOW (ref 34.5–45)
HCT VFR BLD CALC: 29.8 % — LOW (ref 34.5–45)
HCT VFR BLD CALC: 29.9 % — LOW (ref 34.5–45)
HCT VFR BLD CALC: 29.9 % — LOW (ref 34.5–45)
HCT VFR BLD CALC: 30 % — LOW (ref 34.5–45)
HCT VFR BLD CALC: 30 % — LOW (ref 34.5–45)
HCT VFR BLD CALC: 30.2 % — LOW (ref 34.5–45)
HCT VFR BLD CALC: 30.2 % — LOW (ref 34.5–45)
HCT VFR BLD CALC: 30.6 % — LOW (ref 34.5–45)
HCT VFR BLD CALC: 30.6 % — LOW (ref 34.5–45)
HCT VFR BLD CALC: 30.7 % — LOW (ref 34.5–45)
HCT VFR BLD CALC: 30.9 % — LOW (ref 34.5–45)
HCT VFR BLD CALC: 30.9 % — LOW (ref 34.5–45)
HCT VFR BLD CALC: 31.3 % — LOW (ref 34.5–45)
HCT VFR BLD CALC: 31.4 % — LOW (ref 34.5–45)
HCT VFR BLD CALC: 31.4 % — LOW (ref 34.5–45)
HCT VFR BLD CALC: 31.5 % — LOW (ref 34.5–45)
HCT VFR BLD CALC: 31.7 % — LOW (ref 34.5–45)
HCT VFR BLD CALC: 31.7 % — LOW (ref 34.5–45)
HCT VFR BLD CALC: 31.8 % — LOW (ref 34.5–45)
HCT VFR BLD CALC: 32.4 % — LOW (ref 34.5–45)
HCT VFR BLD CALC: 32.5 % — LOW (ref 34.5–45)
HCT VFR BLD CALC: 32.5 % — LOW (ref 34.5–45)
HCT VFR BLD CALC: 32.8 % — LOW (ref 34.5–45)
HCT VFR BLD CALC: 32.9 % — LOW (ref 34.5–45)
HCT VFR BLD CALC: 33.2 % — LOW (ref 34.5–45)
HCT VFR BLD CALC: 33.3 % — LOW (ref 34.5–45)
HCT VFR BLD CALC: 33.6 % — LOW (ref 34.5–45)
HCT VFR BLD CALC: 33.8 % — LOW (ref 34.5–45)
HCT VFR BLD CALC: 34.2 % — LOW (ref 34.5–45)
HCT VFR BLD CALC: 34.4 % — LOW (ref 34.5–45)
HCT VFR BLD CALC: 35.4 % — SIGNIFICANT CHANGE UP (ref 34.5–45)
HCT VFR BLD CALC: 36.1 % — SIGNIFICANT CHANGE UP (ref 34.5–45)
HCT VFR BLD CALC: 36.2 % — SIGNIFICANT CHANGE UP (ref 34.5–45)
HCT VFR BLD CALC: 36.2 % — SIGNIFICANT CHANGE UP (ref 34.5–45)
HCT VFR BLD CALC: 37 % — SIGNIFICANT CHANGE UP (ref 34.5–45)
HCT VFR BLD CALC: 42.4 % — SIGNIFICANT CHANGE UP (ref 34.5–45)
HCV AB S/CO SERPL IA: 0.15 S/CO — SIGNIFICANT CHANGE UP (ref 0–0.99)
HCV AB SERPL-IMP: SIGNIFICANT CHANGE UP
HDLC SERPL-MCNC: 55 MG/DL — SIGNIFICANT CHANGE UP
HDLC SERPL-MCNC: 59 MG/DL — SIGNIFICANT CHANGE UP
HGB BLD-MCNC: 10 G/DL — LOW (ref 11.5–15.5)
HGB BLD-MCNC: 10 G/DL — LOW (ref 11.5–15.5)
HGB BLD-MCNC: 10.1 G/DL — LOW (ref 11.5–15.5)
HGB BLD-MCNC: 10.2 G/DL — LOW (ref 11.5–15.5)
HGB BLD-MCNC: 10.3 G/DL — LOW (ref 11.5–15.5)
HGB BLD-MCNC: 10.4 G/DL — LOW (ref 11.5–15.5)
HGB BLD-MCNC: 10.5 G/DL — LOW (ref 11.5–15.5)
HGB BLD-MCNC: 10.6 G/DL — LOW (ref 11.5–15.5)
HGB BLD-MCNC: 10.7 G/DL — LOW (ref 11.5–15.5)
HGB BLD-MCNC: 10.8 G/DL — LOW (ref 11.5–15.5)
HGB BLD-MCNC: 10.9 G/DL — LOW (ref 11.5–15.5)
HGB BLD-MCNC: 11 G/DL — LOW (ref 11.5–15.5)
HGB BLD-MCNC: 11 G/DL — LOW (ref 11.5–15.5)
HGB BLD-MCNC: 11.7 G/DL — SIGNIFICANT CHANGE UP (ref 11.5–15.5)
HGB BLD-MCNC: 11.8 G/DL — SIGNIFICANT CHANGE UP (ref 11.5–15.5)
HGB BLD-MCNC: 12.9 G/DL — SIGNIFICANT CHANGE UP (ref 11.5–15.5)
HGB BLD-MCNC: 4.3 G/DL — CRITICAL LOW (ref 11.5–15.5)
HGB BLD-MCNC: 5.2 G/DL — CRITICAL LOW (ref 11.5–15.5)
HGB BLD-MCNC: 6 G/DL — CRITICAL LOW (ref 11.5–15.5)
HGB BLD-MCNC: 6.1 G/DL — CRITICAL LOW (ref 11.5–15.5)
HGB BLD-MCNC: 6.3 G/DL — CRITICAL LOW (ref 11.5–15.5)
HGB BLD-MCNC: 6.7 G/DL — CRITICAL LOW (ref 11.5–15.5)
HGB BLD-MCNC: 6.8 G/DL — CRITICAL LOW (ref 11.5–15.5)
HGB BLD-MCNC: 6.9 G/DL — CRITICAL LOW (ref 11.5–15.5)
HGB BLD-MCNC: 7 G/DL — CRITICAL LOW (ref 11.5–15.5)
HGB BLD-MCNC: 7.1 G/DL — LOW (ref 11.5–15.5)
HGB BLD-MCNC: 7.2 G/DL — LOW (ref 11.5–15.5)
HGB BLD-MCNC: 7.3 G/DL — LOW (ref 11.5–15.5)
HGB BLD-MCNC: 7.4 G/DL — LOW (ref 11.5–15.5)
HGB BLD-MCNC: 7.5 G/DL — LOW (ref 11.5–15.5)
HGB BLD-MCNC: 7.6 G/DL — LOW (ref 11.5–15.5)
HGB BLD-MCNC: 7.6 G/DL — LOW (ref 11.5–15.5)
HGB BLD-MCNC: 7.7 G/DL — LOW (ref 11.5–15.5)
HGB BLD-MCNC: 7.8 G/DL — LOW (ref 11.5–15.5)
HGB BLD-MCNC: 7.9 G/DL — LOW (ref 11.5–15.5)
HGB BLD-MCNC: 8 G/DL — LOW (ref 11.5–15.5)
HGB BLD-MCNC: 8.1 G/DL — LOW (ref 11.5–15.5)
HGB BLD-MCNC: 8.2 G/DL — LOW (ref 11.5–15.5)
HGB BLD-MCNC: 8.3 G/DL — LOW (ref 11.5–15.5)
HGB BLD-MCNC: 8.4 G/DL — LOW (ref 11.5–15.5)
HGB BLD-MCNC: 8.5 G/DL — LOW (ref 11.5–15.5)
HGB BLD-MCNC: 8.6 G/DL — LOW (ref 11.5–15.5)
HGB BLD-MCNC: 8.7 G/DL — LOW (ref 11.5–15.5)
HGB BLD-MCNC: 8.8 G/DL — LOW (ref 11.5–15.5)
HGB BLD-MCNC: 8.9 G/DL — LOW (ref 11.5–15.5)
HGB BLD-MCNC: 9 G/DL — LOW (ref 11.5–15.5)
HGB BLD-MCNC: 9.1 G/DL — LOW (ref 11.5–15.5)
HGB BLD-MCNC: 9.2 G/DL — LOW (ref 11.5–15.5)
HGB BLD-MCNC: 9.2 G/DL — LOW (ref 11.5–15.5)
HGB BLD-MCNC: 9.3 G/DL — LOW (ref 11.5–15.5)
HGB BLD-MCNC: 9.4 G/DL — LOW (ref 11.5–15.5)
HGB BLD-MCNC: 9.5 G/DL — LOW (ref 11.5–15.5)
HGB BLD-MCNC: 9.7 G/DL — LOW (ref 11.5–15.5)
HGB BLD-MCNC: 9.8 G/DL — LOW (ref 11.5–15.5)
HGB BLD-MCNC: 9.8 G/DL — LOW (ref 11.5–15.5)
HGB BLD-MCNC: 9.9 G/DL — LOW (ref 11.5–15.5)
HGB BLDA-MCNC: 10.3 G/DL — LOW (ref 11.5–15.5)
HGB BLDA-MCNC: 10.5 G/DL — LOW (ref 11.5–15.5)
HGB BLDA-MCNC: 7.8 G/DL — LOW (ref 11.5–15.5)
HGB BLDA-MCNC: 8.2 G/DL — LOW (ref 11.5–15.5)
HYALINE CASTS # UR AUTO: 0 /LPF — SIGNIFICANT CHANGE UP (ref 0–2)
HYALINE CASTS # UR AUTO: 0 /LPF — SIGNIFICANT CHANGE UP (ref 0–2)
HYALINE CASTS # UR AUTO: 3 /LPF — HIGH (ref 0–2)
HYPOCHROMIA BLD QL: SIGNIFICANT CHANGE UP
HYPOCHROMIA BLD QL: SLIGHT — SIGNIFICANT CHANGE UP
IMM GRANULOCYTES NFR BLD AUTO: 0.4 % — SIGNIFICANT CHANGE UP (ref 0–1.5)
IMM GRANULOCYTES NFR BLD AUTO: 0.5 % — SIGNIFICANT CHANGE UP (ref 0–1.5)
IMM GRANULOCYTES NFR BLD AUTO: 0.6 % — SIGNIFICANT CHANGE UP (ref 0–1.5)
IMM GRANULOCYTES NFR BLD AUTO: 0.6 % — SIGNIFICANT CHANGE UP (ref 0–1.5)
IMM GRANULOCYTES NFR BLD AUTO: 0.7 % — SIGNIFICANT CHANGE UP (ref 0–1.5)
IMM GRANULOCYTES NFR BLD AUTO: 1.1 % — SIGNIFICANT CHANGE UP (ref 0–1.5)
IMM GRANULOCYTES NFR BLD AUTO: 1.2 % — SIGNIFICANT CHANGE UP (ref 0–1.5)
IMM GRANULOCYTES NFR BLD AUTO: 1.7 % — HIGH (ref 0–1.5)
IMM GRANULOCYTES NFR BLD AUTO: 2.7 % — HIGH (ref 0–1.5)
INR BLD: 0.97 RATIO — SIGNIFICANT CHANGE UP (ref 0.88–1.16)
INR BLD: 0.97 RATIO — SIGNIFICANT CHANGE UP (ref 0.88–1.16)
INR BLD: 0.98 RATIO — SIGNIFICANT CHANGE UP (ref 0.88–1.16)
INR BLD: 1.01 RATIO — SIGNIFICANT CHANGE UP (ref 0.88–1.16)
INR BLD: 1.02 RATIO — SIGNIFICANT CHANGE UP (ref 0.88–1.16)
INR BLD: 1.05 RATIO — SIGNIFICANT CHANGE UP (ref 0.88–1.16)
INR BLD: 1.05 RATIO — SIGNIFICANT CHANGE UP (ref 0.88–1.16)
INR BLD: 1.05 — SIGNIFICANT CHANGE UP (ref 0.88–1.16)
INR BLD: 1.07 — SIGNIFICANT CHANGE UP (ref 0.88–1.16)
INR BLD: 1.08 RATIO — SIGNIFICANT CHANGE UP (ref 0.88–1.16)
INR BLD: 1.08 RATIO — SIGNIFICANT CHANGE UP (ref 0.88–1.16)
INR BLD: 1.09 RATIO — SIGNIFICANT CHANGE UP (ref 0.88–1.16)
INR BLD: 1.09 — SIGNIFICANT CHANGE UP (ref 0.88–1.16)
INR BLD: 1.1 RATIO — SIGNIFICANT CHANGE UP (ref 0.88–1.16)
INR BLD: 1.1 RATIO — SIGNIFICANT CHANGE UP (ref 0.88–1.16)
INR BLD: 1.11 RATIO — SIGNIFICANT CHANGE UP (ref 0.88–1.16)
INR BLD: 1.11 — SIGNIFICANT CHANGE UP (ref 0.88–1.16)
INR BLD: 1.11 — SIGNIFICANT CHANGE UP (ref 0.88–1.16)
INR BLD: 1.13 RATIO — SIGNIFICANT CHANGE UP (ref 0.88–1.16)
INR BLD: 1.14 — SIGNIFICANT CHANGE UP (ref 0.88–1.16)
INR BLD: 1.15 — SIGNIFICANT CHANGE UP (ref 0.88–1.16)
INR BLD: 1.15 — SIGNIFICANT CHANGE UP (ref 0.88–1.16)
INR BLD: 1.16 RATIO — SIGNIFICANT CHANGE UP (ref 0.88–1.16)
INR BLD: 1.16 — SIGNIFICANT CHANGE UP (ref 0.88–1.16)
INR BLD: 1.17 — HIGH (ref 0.88–1.16)
INR BLD: 1.19 RATIO — HIGH (ref 0.88–1.16)
INR BLD: 1.19 RATIO — HIGH (ref 0.88–1.16)
INR BLD: 1.2 — HIGH (ref 0.88–1.16)
INR BLD: 1.25 — HIGH (ref 0.88–1.16)
INR BLD: 1.26 — HIGH (ref 0.88–1.16)
INR BLD: 1.26 — HIGH (ref 0.88–1.16)
INR BLD: 1.27 — HIGH (ref 0.88–1.16)
INR BLD: 1.32 — HIGH (ref 0.88–1.16)
INR BLD: 1.38 — HIGH (ref 0.88–1.16)
IRON SATN MFR SERPL: 198 UG/DL — HIGH (ref 30–160)
IRON SATN MFR SERPL: SIGNIFICANT CHANGE UP % (ref 14–50)
KETONES UR-MCNC: NEGATIVE — SIGNIFICANT CHANGE UP
LACTATE SERPL-SCNC: 0.4 MMOL/L — LOW (ref 0.5–2)
LACTATE SERPL-SCNC: 0.4 MMOL/L — LOW (ref 0.7–2)
LACTATE SERPL-SCNC: 0.6 MMOL/L — SIGNIFICANT CHANGE UP (ref 0.5–2)
LACTATE SERPL-SCNC: 0.7 MMOL/L — SIGNIFICANT CHANGE UP (ref 0.5–2)
LACTATE SERPL-SCNC: 0.7 MMOL/L — SIGNIFICANT CHANGE UP (ref 0.5–2)
LACTATE SERPL-SCNC: 0.7 MMOL/L — SIGNIFICANT CHANGE UP (ref 0.7–2)
LACTATE SERPL-SCNC: 1.1 MMOL/L — SIGNIFICANT CHANGE UP (ref 0.5–2)
LACTATE SERPL-SCNC: 1.2 MMOL/L — SIGNIFICANT CHANGE UP (ref 0.5–2)
LACTATE SERPL-SCNC: 1.4 MMOL/L — SIGNIFICANT CHANGE UP (ref 0.7–2)
LACTATE SERPL-SCNC: 1.5 MMOL/L — SIGNIFICANT CHANGE UP (ref 0.7–2)
LACTATE SERPL-SCNC: 1.6 MMOL/L — SIGNIFICANT CHANGE UP (ref 0.7–2)
LACTATE SERPL-SCNC: 2.2 MMOL/L — HIGH (ref 0.5–2)
LACTATE SERPL-SCNC: 2.4 MMOL/L — HIGH (ref 0.5–2)
LACTATE SERPL-SCNC: 3.2 MMOL/L — HIGH (ref 0.7–2)
LACTATE SERPL-SCNC: 7.4 MMOL/L — CRITICAL HIGH (ref 0.7–2)
LACTATE SERPL-SCNC: 8 MMOL/L — CRITICAL HIGH (ref 0.7–2)
LDH SERPL L TO P-CCNC: 599 U/L — HIGH (ref 50–242)
LEUKOCYTE ESTERASE UR-ACNC: ABNORMAL
LIDOCAIN IGE QN: 10 U/L — SIGNIFICANT CHANGE UP (ref 7–60)
LIDOCAIN IGE QN: 19 U/L — SIGNIFICANT CHANGE UP (ref 7–60)
LIDOCAIN IGE QN: 65 U/L — LOW (ref 73–393)
LIPID PNL WITH DIRECT LDL SERPL: 50 MG/DL — SIGNIFICANT CHANGE UP
LIPID PNL WITH DIRECT LDL SERPL: 51 MG/DL — SIGNIFICANT CHANGE UP
LYMPHOCYTES # BLD AUTO: 0.54 K/UL — LOW (ref 1–3.3)
LYMPHOCYTES # BLD AUTO: 0.62 K/UL — LOW (ref 1–3.3)
LYMPHOCYTES # BLD AUTO: 0.69 K/UL — LOW (ref 1–3.3)
LYMPHOCYTES # BLD AUTO: 0.8 K/UL — LOW (ref 1–3.3)
LYMPHOCYTES # BLD AUTO: 0.82 K/UL — LOW (ref 1–3.3)
LYMPHOCYTES # BLD AUTO: 0.86 K/UL — LOW (ref 1–3.3)
LYMPHOCYTES # BLD AUTO: 0.91 K/UL — LOW (ref 1–3.3)
LYMPHOCYTES # BLD AUTO: 0.94 K/UL — LOW (ref 1–3.3)
LYMPHOCYTES # BLD AUTO: 1.04 K/UL — SIGNIFICANT CHANGE UP (ref 1–3.3)
LYMPHOCYTES # BLD AUTO: 1.1 K/UL — SIGNIFICANT CHANGE UP (ref 1–3.3)
LYMPHOCYTES # BLD AUTO: 1.28 K/UL — SIGNIFICANT CHANGE UP (ref 1–3.3)
LYMPHOCYTES # BLD AUTO: 1.3 K/UL — SIGNIFICANT CHANGE UP (ref 1–3.3)
LYMPHOCYTES # BLD AUTO: 1.3 K/UL — SIGNIFICANT CHANGE UP (ref 1–3.3)
LYMPHOCYTES # BLD AUTO: 1.4 K/UL — SIGNIFICANT CHANGE UP (ref 1–3.3)
LYMPHOCYTES # BLD AUTO: 1.4 K/UL — SIGNIFICANT CHANGE UP (ref 1–3.3)
LYMPHOCYTES # BLD AUTO: 1.46 K/UL — SIGNIFICANT CHANGE UP (ref 1–3.3)
LYMPHOCYTES # BLD AUTO: 1.5 K/UL — SIGNIFICANT CHANGE UP (ref 1–3.3)
LYMPHOCYTES # BLD AUTO: 1.59 K/UL — SIGNIFICANT CHANGE UP (ref 1–3.3)
LYMPHOCYTES # BLD AUTO: 1.82 K/UL — SIGNIFICANT CHANGE UP (ref 1–3.3)
LYMPHOCYTES # BLD AUTO: 11.1 % — LOW (ref 13–44)
LYMPHOCYTES # BLD AUTO: 11.9 % — LOW (ref 13–44)
LYMPHOCYTES # BLD AUTO: 12 % — LOW (ref 13–44)
LYMPHOCYTES # BLD AUTO: 12.1 % — LOW (ref 13–44)
LYMPHOCYTES # BLD AUTO: 12.3 % — LOW (ref 13–44)
LYMPHOCYTES # BLD AUTO: 12.5 % — LOW (ref 13–44)
LYMPHOCYTES # BLD AUTO: 13.5 % — SIGNIFICANT CHANGE UP (ref 13–44)
LYMPHOCYTES # BLD AUTO: 13.5 % — SIGNIFICANT CHANGE UP (ref 13–44)
LYMPHOCYTES # BLD AUTO: 15.6 % — SIGNIFICANT CHANGE UP (ref 13–44)
LYMPHOCYTES # BLD AUTO: 15.8 % — SIGNIFICANT CHANGE UP (ref 13–44)
LYMPHOCYTES # BLD AUTO: 15.9 % — SIGNIFICANT CHANGE UP (ref 13–44)
LYMPHOCYTES # BLD AUTO: 16 % — SIGNIFICANT CHANGE UP (ref 13–44)
LYMPHOCYTES # BLD AUTO: 18.9 % — SIGNIFICANT CHANGE UP (ref 13–44)
LYMPHOCYTES # BLD AUTO: 2 % — LOW (ref 13–44)
LYMPHOCYTES # BLD AUTO: 2.1 K/UL — SIGNIFICANT CHANGE UP (ref 1–3.3)
LYMPHOCYTES # BLD AUTO: 2.65 K/UL — SIGNIFICANT CHANGE UP (ref 1–3.3)
LYMPHOCYTES # BLD AUTO: 21.9 % — SIGNIFICANT CHANGE UP (ref 13–44)
LYMPHOCYTES # BLD AUTO: 22 % — SIGNIFICANT CHANGE UP (ref 13–44)
LYMPHOCYTES # BLD AUTO: 3.09 K/UL — SIGNIFICANT CHANGE UP (ref 1–3.3)
LYMPHOCYTES # BLD AUTO: 3.4 % — LOW (ref 13–44)
LYMPHOCYTES # BLD AUTO: 32.6 % — SIGNIFICANT CHANGE UP (ref 13–44)
LYMPHOCYTES # BLD AUTO: 6.1 % — LOW (ref 13–44)
LYMPHOCYTES # BLD AUTO: 6.4 % — LOW (ref 13–44)
LYMPHOCYTES # BLD AUTO: 6.9 % — LOW (ref 13–44)
LYMPHOCYTES # BLD AUTO: 8.4 % — LOW (ref 13–44)
LYMPHOCYTES # BLD AUTO: 8.5 % — LOW (ref 13–44)
LYMPHOCYTES # BLD AUTO: 9 % — LOW (ref 13–44)
MACROCYTES BLD QL: SLIGHT — SIGNIFICANT CHANGE UP
MACROCYTES BLD QL: SLIGHT — SIGNIFICANT CHANGE UP
MAGNESIUM SERPL-MCNC: 1.6 MG/DL — SIGNIFICANT CHANGE UP (ref 1.6–2.6)
MAGNESIUM SERPL-MCNC: 1.7 MG/DL — SIGNIFICANT CHANGE UP (ref 1.6–2.6)
MAGNESIUM SERPL-MCNC: 1.8 MG/DL — SIGNIFICANT CHANGE UP (ref 1.6–2.6)
MAGNESIUM SERPL-MCNC: 1.9 MG/DL — SIGNIFICANT CHANGE UP (ref 1.6–2.6)
MAGNESIUM SERPL-MCNC: 2 MG/DL — SIGNIFICANT CHANGE UP (ref 1.6–2.6)
MAGNESIUM SERPL-MCNC: 2.1 MG/DL — SIGNIFICANT CHANGE UP (ref 1.6–2.6)
MAGNESIUM SERPL-MCNC: 2.2 MG/DL — SIGNIFICANT CHANGE UP (ref 1.6–2.6)
MAGNESIUM SERPL-MCNC: 2.3 MG/DL — SIGNIFICANT CHANGE UP (ref 1.6–2.6)
MAGNESIUM SERPL-MCNC: 2.3 MG/DL — SIGNIFICANT CHANGE UP (ref 1.6–2.6)
MAGNESIUM SERPL-MCNC: 2.5 MG/DL — SIGNIFICANT CHANGE UP (ref 1.6–2.6)
MAGNESIUM SERPL-MCNC: 2.5 MG/DL — SIGNIFICANT CHANGE UP (ref 1.6–2.6)
MAGNESIUM SERPL-MCNC: 2.7 MG/DL — HIGH (ref 1.6–2.6)
MAGNESIUM SERPL-MCNC: 3.1 MG/DL — HIGH (ref 1.6–2.6)
MCHC RBC-ENTMCNC: 21.6 PG — LOW (ref 27–34)
MCHC RBC-ENTMCNC: 24.5 PG — LOW (ref 27–34)
MCHC RBC-ENTMCNC: 24.6 PG — LOW (ref 27–34)
MCHC RBC-ENTMCNC: 24.7 PG — LOW (ref 27–34)
MCHC RBC-ENTMCNC: 24.9 PG — LOW (ref 27–34)
MCHC RBC-ENTMCNC: 24.9 PG — LOW (ref 27–34)
MCHC RBC-ENTMCNC: 25.3 PG — LOW (ref 27–34)
MCHC RBC-ENTMCNC: 27.2 PG — SIGNIFICANT CHANGE UP (ref 27–34)
MCHC RBC-ENTMCNC: 27.3 PG — SIGNIFICANT CHANGE UP (ref 27–34)
MCHC RBC-ENTMCNC: 27.3 PG — SIGNIFICANT CHANGE UP (ref 27–34)
MCHC RBC-ENTMCNC: 27.4 PG — SIGNIFICANT CHANGE UP (ref 27–34)
MCHC RBC-ENTMCNC: 27.5 PG — SIGNIFICANT CHANGE UP (ref 27–34)
MCHC RBC-ENTMCNC: 27.5 PG — SIGNIFICANT CHANGE UP (ref 27–34)
MCHC RBC-ENTMCNC: 27.6 PG — SIGNIFICANT CHANGE UP (ref 27–34)
MCHC RBC-ENTMCNC: 27.7 PG — SIGNIFICANT CHANGE UP (ref 27–34)
MCHC RBC-ENTMCNC: 27.8 PG — SIGNIFICANT CHANGE UP (ref 27–34)
MCHC RBC-ENTMCNC: 27.9 PG — SIGNIFICANT CHANGE UP (ref 27–34)
MCHC RBC-ENTMCNC: 28 PG — SIGNIFICANT CHANGE UP (ref 27–34)
MCHC RBC-ENTMCNC: 28.1 PG — SIGNIFICANT CHANGE UP (ref 27–34)
MCHC RBC-ENTMCNC: 28.2 PG — SIGNIFICANT CHANGE UP (ref 27–34)
MCHC RBC-ENTMCNC: 28.3 GM/DL — LOW (ref 32–36)
MCHC RBC-ENTMCNC: 28.3 PG — SIGNIFICANT CHANGE UP (ref 27–34)
MCHC RBC-ENTMCNC: 28.4 PG — SIGNIFICANT CHANGE UP (ref 27–34)
MCHC RBC-ENTMCNC: 28.5 PG — SIGNIFICANT CHANGE UP (ref 27–34)
MCHC RBC-ENTMCNC: 28.6 PG — SIGNIFICANT CHANGE UP (ref 27–34)
MCHC RBC-ENTMCNC: 28.7 PG — SIGNIFICANT CHANGE UP (ref 27–34)
MCHC RBC-ENTMCNC: 28.8 PG — SIGNIFICANT CHANGE UP (ref 27–34)
MCHC RBC-ENTMCNC: 28.9 PG — SIGNIFICANT CHANGE UP (ref 27–34)
MCHC RBC-ENTMCNC: 29 PG — SIGNIFICANT CHANGE UP (ref 27–34)
MCHC RBC-ENTMCNC: 29.1 PG — SIGNIFICANT CHANGE UP (ref 27–34)
MCHC RBC-ENTMCNC: 29.2 GM/DL — LOW (ref 32–36)
MCHC RBC-ENTMCNC: 29.2 PG — SIGNIFICANT CHANGE UP (ref 27–34)
MCHC RBC-ENTMCNC: 29.3 GM/DL — LOW (ref 32–36)
MCHC RBC-ENTMCNC: 29.3 PG — SIGNIFICANT CHANGE UP (ref 27–34)
MCHC RBC-ENTMCNC: 29.4 GM/DL — LOW (ref 32–36)
MCHC RBC-ENTMCNC: 29.4 GM/DL — LOW (ref 32–36)
MCHC RBC-ENTMCNC: 29.4 PG — SIGNIFICANT CHANGE UP (ref 27–34)
MCHC RBC-ENTMCNC: 29.5 PG — SIGNIFICANT CHANGE UP (ref 27–34)
MCHC RBC-ENTMCNC: 29.6 PG — SIGNIFICANT CHANGE UP (ref 27–34)
MCHC RBC-ENTMCNC: 29.7 GM/DL — LOW (ref 32–36)
MCHC RBC-ENTMCNC: 29.7 GM/DL — LOW (ref 32–36)
MCHC RBC-ENTMCNC: 29.7 PG — SIGNIFICANT CHANGE UP (ref 27–34)
MCHC RBC-ENTMCNC: 29.8 GM/DL — LOW (ref 32–36)
MCHC RBC-ENTMCNC: 29.9 GM/DL — LOW (ref 32–36)
MCHC RBC-ENTMCNC: 29.9 PG — SIGNIFICANT CHANGE UP (ref 27–34)
MCHC RBC-ENTMCNC: 29.9 PG — SIGNIFICANT CHANGE UP (ref 27–34)
MCHC RBC-ENTMCNC: 30 GM/DL — LOW (ref 32–36)
MCHC RBC-ENTMCNC: 30.1 GM/DL — LOW (ref 32–36)
MCHC RBC-ENTMCNC: 30.1 PG — SIGNIFICANT CHANGE UP (ref 27–34)
MCHC RBC-ENTMCNC: 30.1 PG — SIGNIFICANT CHANGE UP (ref 27–34)
MCHC RBC-ENTMCNC: 30.2 GM/DL — LOW (ref 32–36)
MCHC RBC-ENTMCNC: 30.2 GM/DL — LOW (ref 32–36)
MCHC RBC-ENTMCNC: 30.2 PG — SIGNIFICANT CHANGE UP (ref 27–34)
MCHC RBC-ENTMCNC: 30.2 PG — SIGNIFICANT CHANGE UP (ref 27–34)
MCHC RBC-ENTMCNC: 30.4 GM/DL — LOW (ref 32–36)
MCHC RBC-ENTMCNC: 30.5 GM/DL — LOW (ref 32–36)
MCHC RBC-ENTMCNC: 30.5 GM/DL — LOW (ref 32–36)
MCHC RBC-ENTMCNC: 30.5 PG — SIGNIFICANT CHANGE UP (ref 27–34)
MCHC RBC-ENTMCNC: 30.6 GM/DL — LOW (ref 32–36)
MCHC RBC-ENTMCNC: 30.6 PG — SIGNIFICANT CHANGE UP (ref 27–34)
MCHC RBC-ENTMCNC: 30.7 GM/DL — LOW (ref 32–36)
MCHC RBC-ENTMCNC: 30.7 PG — SIGNIFICANT CHANGE UP (ref 27–34)
MCHC RBC-ENTMCNC: 30.8 GM/DL — LOW (ref 32–36)
MCHC RBC-ENTMCNC: 30.8 GM/DL — LOW (ref 32–36)
MCHC RBC-ENTMCNC: 30.9 GM/DL — LOW (ref 32–36)
MCHC RBC-ENTMCNC: 31 GM/DL — LOW (ref 32–36)
MCHC RBC-ENTMCNC: 31.1 GM/DL — LOW (ref 32–36)
MCHC RBC-ENTMCNC: 31.2 GM/DL — LOW (ref 32–36)
MCHC RBC-ENTMCNC: 31.3 GM/DL — LOW (ref 32–36)
MCHC RBC-ENTMCNC: 31.4 GM/DL — LOW (ref 32–36)
MCHC RBC-ENTMCNC: 31.5 GM/DL — LOW (ref 32–36)
MCHC RBC-ENTMCNC: 31.6 GM/DL — LOW (ref 32–36)
MCHC RBC-ENTMCNC: 31.7 GM/DL — LOW (ref 32–36)
MCHC RBC-ENTMCNC: 31.8 GM/DL — LOW (ref 32–36)
MCHC RBC-ENTMCNC: 31.9 GM/DL — LOW (ref 32–36)
MCHC RBC-ENTMCNC: 32 GM/DL — SIGNIFICANT CHANGE UP (ref 32–36)
MCHC RBC-ENTMCNC: 32.1 GM/DL — SIGNIFICANT CHANGE UP (ref 32–36)
MCHC RBC-ENTMCNC: 32.2 GM/DL — SIGNIFICANT CHANGE UP (ref 32–36)
MCHC RBC-ENTMCNC: 32.3 GM/DL — SIGNIFICANT CHANGE UP (ref 32–36)
MCHC RBC-ENTMCNC: 32.4 GM/DL — SIGNIFICANT CHANGE UP (ref 32–36)
MCHC RBC-ENTMCNC: 32.5 GM/DL — SIGNIFICANT CHANGE UP (ref 32–36)
MCHC RBC-ENTMCNC: 32.6 GM/DL — SIGNIFICANT CHANGE UP (ref 32–36)
MCHC RBC-ENTMCNC: 32.7 GM/DL — SIGNIFICANT CHANGE UP (ref 32–36)
MCHC RBC-ENTMCNC: 32.8 GM/DL — SIGNIFICANT CHANGE UP (ref 32–36)
MCHC RBC-ENTMCNC: 32.9 GM/DL — SIGNIFICANT CHANGE UP (ref 32–36)
MCHC RBC-ENTMCNC: 33 GM/DL — SIGNIFICANT CHANGE UP (ref 32–36)
MCHC RBC-ENTMCNC: 33.1 GM/DL — SIGNIFICANT CHANGE UP (ref 32–36)
MCHC RBC-ENTMCNC: 33.2 GM/DL — SIGNIFICANT CHANGE UP (ref 32–36)
MCHC RBC-ENTMCNC: 33.3 GM/DL — SIGNIFICANT CHANGE UP (ref 32–36)
MCHC RBC-ENTMCNC: 33.5 GM/DL — SIGNIFICANT CHANGE UP (ref 32–36)
MCHC RBC-ENTMCNC: 33.5 GM/DL — SIGNIFICANT CHANGE UP (ref 32–36)
MCHC RBC-ENTMCNC: 33.6 GM/DL — SIGNIFICANT CHANGE UP (ref 32–36)
MCHC RBC-ENTMCNC: 33.8 GM/DL — SIGNIFICANT CHANGE UP (ref 32–36)
MCHC RBC-ENTMCNC: 33.8 GM/DL — SIGNIFICANT CHANGE UP (ref 32–36)
MCHC RBC-ENTMCNC: 34.1 GM/DL — SIGNIFICANT CHANGE UP (ref 32–36)
MCHC RBC-ENTMCNC: 34.2 GM/DL — SIGNIFICANT CHANGE UP (ref 32–36)
MCHC RBC-ENTMCNC: 34.2 GM/DL — SIGNIFICANT CHANGE UP (ref 32–36)
MCHC RBC-ENTMCNC: 34.6 GM/DL — SIGNIFICANT CHANGE UP (ref 32–36)
MCV RBC AUTO: 76.4 FL — LOW (ref 80–100)
MCV RBC AUTO: 76.9 FL — LOW (ref 80–100)
MCV RBC AUTO: 78 FL — LOW (ref 80–100)
MCV RBC AUTO: 79.3 FL — LOW (ref 80–100)
MCV RBC AUTO: 79.6 FL — LOW (ref 80–100)
MCV RBC AUTO: 80.1 FL — SIGNIFICANT CHANGE UP (ref 80–100)
MCV RBC AUTO: 81.1 FL — SIGNIFICANT CHANGE UP (ref 80–100)
MCV RBC AUTO: 81.9 FL — SIGNIFICANT CHANGE UP (ref 80–100)
MCV RBC AUTO: 82 FL — SIGNIFICANT CHANGE UP (ref 80–100)
MCV RBC AUTO: 82.4 FL — SIGNIFICANT CHANGE UP (ref 80–100)
MCV RBC AUTO: 83.6 FL — SIGNIFICANT CHANGE UP (ref 80–100)
MCV RBC AUTO: 83.7 FL — SIGNIFICANT CHANGE UP (ref 80–100)
MCV RBC AUTO: 84.4 FL — SIGNIFICANT CHANGE UP (ref 80–100)
MCV RBC AUTO: 84.4 FL — SIGNIFICANT CHANGE UP (ref 80–100)
MCV RBC AUTO: 84.9 FL — SIGNIFICANT CHANGE UP (ref 80–100)
MCV RBC AUTO: 85.3 FL — SIGNIFICANT CHANGE UP (ref 80–100)
MCV RBC AUTO: 85.3 FL — SIGNIFICANT CHANGE UP (ref 80–100)
MCV RBC AUTO: 85.6 FL — SIGNIFICANT CHANGE UP (ref 80–100)
MCV RBC AUTO: 85.6 FL — SIGNIFICANT CHANGE UP (ref 80–100)
MCV RBC AUTO: 85.7 FL — SIGNIFICANT CHANGE UP (ref 80–100)
MCV RBC AUTO: 85.7 FL — SIGNIFICANT CHANGE UP (ref 80–100)
MCV RBC AUTO: 85.8 FL — SIGNIFICANT CHANGE UP (ref 80–100)
MCV RBC AUTO: 86 FL — SIGNIFICANT CHANGE UP (ref 80–100)
MCV RBC AUTO: 86.2 FL — SIGNIFICANT CHANGE UP (ref 80–100)
MCV RBC AUTO: 86.4 FL — SIGNIFICANT CHANGE UP (ref 80–100)
MCV RBC AUTO: 86.5 FL — SIGNIFICANT CHANGE UP (ref 80–100)
MCV RBC AUTO: 86.5 FL — SIGNIFICANT CHANGE UP (ref 80–100)
MCV RBC AUTO: 86.6 FL — SIGNIFICANT CHANGE UP (ref 80–100)
MCV RBC AUTO: 86.7 FL — SIGNIFICANT CHANGE UP (ref 80–100)
MCV RBC AUTO: 86.8 FL — SIGNIFICANT CHANGE UP (ref 80–100)
MCV RBC AUTO: 87 FL — SIGNIFICANT CHANGE UP (ref 80–100)
MCV RBC AUTO: 87.1 FL — SIGNIFICANT CHANGE UP (ref 80–100)
MCV RBC AUTO: 87.2 FL — SIGNIFICANT CHANGE UP (ref 80–100)
MCV RBC AUTO: 87.3 FL — SIGNIFICANT CHANGE UP (ref 80–100)
MCV RBC AUTO: 87.4 FL — SIGNIFICANT CHANGE UP (ref 80–100)
MCV RBC AUTO: 87.5 FL — SIGNIFICANT CHANGE UP (ref 80–100)
MCV RBC AUTO: 87.6 FL — SIGNIFICANT CHANGE UP (ref 80–100)
MCV RBC AUTO: 87.7 FL — SIGNIFICANT CHANGE UP (ref 80–100)
MCV RBC AUTO: 87.8 FL — SIGNIFICANT CHANGE UP (ref 80–100)
MCV RBC AUTO: 87.9 FL — SIGNIFICANT CHANGE UP (ref 80–100)
MCV RBC AUTO: 88 FL — SIGNIFICANT CHANGE UP (ref 80–100)
MCV RBC AUTO: 88 FL — SIGNIFICANT CHANGE UP (ref 80–100)
MCV RBC AUTO: 88.1 FL — SIGNIFICANT CHANGE UP (ref 80–100)
MCV RBC AUTO: 88.2 FL — SIGNIFICANT CHANGE UP (ref 80–100)
MCV RBC AUTO: 88.3 FL — SIGNIFICANT CHANGE UP (ref 80–100)
MCV RBC AUTO: 88.4 FL — SIGNIFICANT CHANGE UP (ref 80–100)
MCV RBC AUTO: 88.5 FL — SIGNIFICANT CHANGE UP (ref 80–100)
MCV RBC AUTO: 88.6 FL — SIGNIFICANT CHANGE UP (ref 80–100)
MCV RBC AUTO: 88.7 FL — SIGNIFICANT CHANGE UP (ref 80–100)
MCV RBC AUTO: 88.8 FL — SIGNIFICANT CHANGE UP (ref 80–100)
MCV RBC AUTO: 88.9 FL — SIGNIFICANT CHANGE UP (ref 80–100)
MCV RBC AUTO: 89 FL — SIGNIFICANT CHANGE UP (ref 80–100)
MCV RBC AUTO: 89.1 FL — SIGNIFICANT CHANGE UP (ref 80–100)
MCV RBC AUTO: 89.2 FL — SIGNIFICANT CHANGE UP (ref 80–100)
MCV RBC AUTO: 89.3 FL — SIGNIFICANT CHANGE UP (ref 80–100)
MCV RBC AUTO: 89.4 FL — SIGNIFICANT CHANGE UP (ref 80–100)
MCV RBC AUTO: 89.5 FL — SIGNIFICANT CHANGE UP (ref 80–100)
MCV RBC AUTO: 89.6 FL — SIGNIFICANT CHANGE UP (ref 80–100)
MCV RBC AUTO: 89.7 FL — SIGNIFICANT CHANGE UP (ref 80–100)
MCV RBC AUTO: 89.8 FL — SIGNIFICANT CHANGE UP (ref 80–100)
MCV RBC AUTO: 89.9 FL — SIGNIFICANT CHANGE UP (ref 80–100)
MCV RBC AUTO: 90 FL — SIGNIFICANT CHANGE UP (ref 80–100)
MCV RBC AUTO: 90.1 FL — SIGNIFICANT CHANGE UP (ref 80–100)
MCV RBC AUTO: 90.1 FL — SIGNIFICANT CHANGE UP (ref 80–100)
MCV RBC AUTO: 90.2 FL — SIGNIFICANT CHANGE UP (ref 80–100)
MCV RBC AUTO: 90.3 FL — SIGNIFICANT CHANGE UP (ref 80–100)
MCV RBC AUTO: 90.4 FL — SIGNIFICANT CHANGE UP (ref 80–100)
MCV RBC AUTO: 90.4 FL — SIGNIFICANT CHANGE UP (ref 80–100)
MCV RBC AUTO: 90.5 FL — SIGNIFICANT CHANGE UP (ref 80–100)
MCV RBC AUTO: 90.6 FL — SIGNIFICANT CHANGE UP (ref 80–100)
MCV RBC AUTO: 90.7 FL — SIGNIFICANT CHANGE UP (ref 80–100)
MCV RBC AUTO: 90.8 FL — SIGNIFICANT CHANGE UP (ref 80–100)
MCV RBC AUTO: 90.8 FL — SIGNIFICANT CHANGE UP (ref 80–100)
MCV RBC AUTO: 90.9 FL — SIGNIFICANT CHANGE UP (ref 80–100)
MCV RBC AUTO: 91.1 FL — SIGNIFICANT CHANGE UP (ref 80–100)
MCV RBC AUTO: 91.2 FL — SIGNIFICANT CHANGE UP (ref 80–100)
MCV RBC AUTO: 91.4 FL — SIGNIFICANT CHANGE UP (ref 80–100)
MCV RBC AUTO: 91.5 FL — SIGNIFICANT CHANGE UP (ref 80–100)
MCV RBC AUTO: 91.6 FL — SIGNIFICANT CHANGE UP (ref 80–100)
MCV RBC AUTO: 91.7 FL — SIGNIFICANT CHANGE UP (ref 80–100)
MCV RBC AUTO: 91.8 FL — SIGNIFICANT CHANGE UP (ref 80–100)
MCV RBC AUTO: 92.1 FL — SIGNIFICANT CHANGE UP (ref 80–100)
MCV RBC AUTO: 92.2 FL — SIGNIFICANT CHANGE UP (ref 80–100)
MCV RBC AUTO: 92.3 FL — SIGNIFICANT CHANGE UP (ref 80–100)
MCV RBC AUTO: 92.3 FL — SIGNIFICANT CHANGE UP (ref 80–100)
MCV RBC AUTO: 92.4 FL — SIGNIFICANT CHANGE UP (ref 80–100)
MCV RBC AUTO: 92.4 FL — SIGNIFICANT CHANGE UP (ref 80–100)
MCV RBC AUTO: 92.5 FL — SIGNIFICANT CHANGE UP (ref 80–100)
MCV RBC AUTO: 92.5 FL — SIGNIFICANT CHANGE UP (ref 80–100)
MCV RBC AUTO: 92.6 FL — SIGNIFICANT CHANGE UP (ref 80–100)
MCV RBC AUTO: 92.6 FL — SIGNIFICANT CHANGE UP (ref 80–100)
MCV RBC AUTO: 92.7 FL — SIGNIFICANT CHANGE UP (ref 80–100)
MCV RBC AUTO: 92.7 FL — SIGNIFICANT CHANGE UP (ref 80–100)
MCV RBC AUTO: 92.8 FL — SIGNIFICANT CHANGE UP (ref 80–100)
MCV RBC AUTO: 92.9 FL — SIGNIFICANT CHANGE UP (ref 80–100)
MCV RBC AUTO: 93.4 FL — SIGNIFICANT CHANGE UP (ref 80–100)
MCV RBC AUTO: 93.5 FL — SIGNIFICANT CHANGE UP (ref 80–100)
MCV RBC AUTO: 93.6 FL — SIGNIFICANT CHANGE UP (ref 80–100)
MCV RBC AUTO: 93.7 FL — SIGNIFICANT CHANGE UP (ref 80–100)
MCV RBC AUTO: 93.7 FL — SIGNIFICANT CHANGE UP (ref 80–100)
MCV RBC AUTO: 93.8 FL — SIGNIFICANT CHANGE UP (ref 80–100)
MCV RBC AUTO: 94.1 FL — SIGNIFICANT CHANGE UP (ref 80–100)
MCV RBC AUTO: 94.2 FL — SIGNIFICANT CHANGE UP (ref 80–100)
MCV RBC AUTO: 94.2 FL — SIGNIFICANT CHANGE UP (ref 80–100)
MCV RBC AUTO: 94.4 FL — SIGNIFICANT CHANGE UP (ref 80–100)
MCV RBC AUTO: 94.9 FL — SIGNIFICANT CHANGE UP (ref 80–100)
MCV RBC AUTO: 95.1 FL — SIGNIFICANT CHANGE UP (ref 80–100)
MCV RBC AUTO: 96 FL — SIGNIFICANT CHANGE UP (ref 80–100)
MCV RBC AUTO: 96.6 FL — SIGNIFICANT CHANGE UP (ref 80–100)
MCV RBC AUTO: 96.9 FL — SIGNIFICANT CHANGE UP (ref 80–100)
MCV RBC AUTO: 97 FL — SIGNIFICANT CHANGE UP (ref 80–100)
MCV RBC AUTO: 97.1 FL — SIGNIFICANT CHANGE UP (ref 80–100)
METAMYELOCYTES # FLD: 1 % — HIGH (ref 0–0)
METAMYELOCYTES # FLD: 1 % — HIGH (ref 0–0)
METHGB MFR BLDA: 0.2 % — SIGNIFICANT CHANGE UP
METHGB MFR BLDA: 0.3 % — SIGNIFICANT CHANGE UP
METHGB MFR BLDA: 0.4 % — SIGNIFICANT CHANGE UP
METHGB MFR BLDA: 0.7 % — SIGNIFICANT CHANGE UP
METHOD TYPE: SIGNIFICANT CHANGE UP
MICROCYTES BLD QL: SIGNIFICANT CHANGE UP
MICROCYTES BLD QL: SLIGHT — SIGNIFICANT CHANGE UP
MONOCYTES # BLD AUTO: 0.36 K/UL — SIGNIFICANT CHANGE UP (ref 0–0.9)
MONOCYTES # BLD AUTO: 0.6 K/UL — SIGNIFICANT CHANGE UP (ref 0–0.9)
MONOCYTES # BLD AUTO: 0.62 K/UL — SIGNIFICANT CHANGE UP (ref 0–0.9)
MONOCYTES # BLD AUTO: 0.62 K/UL — SIGNIFICANT CHANGE UP (ref 0–0.9)
MONOCYTES # BLD AUTO: 0.65 K/UL — SIGNIFICANT CHANGE UP (ref 0–0.9)
MONOCYTES # BLD AUTO: 0.7 K/UL — SIGNIFICANT CHANGE UP (ref 0–0.9)
MONOCYTES # BLD AUTO: 0.7 K/UL — SIGNIFICANT CHANGE UP (ref 0–0.9)
MONOCYTES # BLD AUTO: 0.73 K/UL — SIGNIFICANT CHANGE UP (ref 0–0.9)
MONOCYTES # BLD AUTO: 0.74 K/UL — SIGNIFICANT CHANGE UP (ref 0–0.9)
MONOCYTES # BLD AUTO: 0.76 K/UL — SIGNIFICANT CHANGE UP (ref 0–0.9)
MONOCYTES # BLD AUTO: 0.77 K/UL — SIGNIFICANT CHANGE UP (ref 0–0.9)
MONOCYTES # BLD AUTO: 0.8 K/UL — SIGNIFICANT CHANGE UP (ref 0–0.9)
MONOCYTES # BLD AUTO: 0.81 K/UL — SIGNIFICANT CHANGE UP (ref 0–0.9)
MONOCYTES # BLD AUTO: 0.85 K/UL — SIGNIFICANT CHANGE UP (ref 0–0.9)
MONOCYTES # BLD AUTO: 0.87 K/UL — SIGNIFICANT CHANGE UP (ref 0–0.9)
MONOCYTES # BLD AUTO: 1 K/UL — HIGH (ref 0–0.9)
MONOCYTES # BLD AUTO: 1.03 K/UL — HIGH (ref 0–0.9)
MONOCYTES # BLD AUTO: 1.3 K/UL — HIGH (ref 0–0.9)
MONOCYTES # BLD AUTO: 1.33 K/UL — HIGH (ref 0–0.9)
MONOCYTES # BLD AUTO: 1.35 K/UL — HIGH (ref 0–0.9)
MONOCYTES # BLD AUTO: 1.71 K/UL — HIGH (ref 0–0.9)
MONOCYTES # BLD AUTO: 1.88 K/UL — HIGH (ref 0–0.9)
MONOCYTES NFR BLD AUTO: 10.3 % — SIGNIFICANT CHANGE UP (ref 2–14)
MONOCYTES NFR BLD AUTO: 11.3 % — SIGNIFICANT CHANGE UP (ref 2–14)
MONOCYTES NFR BLD AUTO: 11.4 % — SIGNIFICANT CHANGE UP (ref 2–14)
MONOCYTES NFR BLD AUTO: 12 % — SIGNIFICANT CHANGE UP (ref 2–14)
MONOCYTES NFR BLD AUTO: 17 % — HIGH (ref 2–14)
MONOCYTES NFR BLD AUTO: 2.8 % — SIGNIFICANT CHANGE UP (ref 2–14)
MONOCYTES NFR BLD AUTO: 3 % — SIGNIFICANT CHANGE UP (ref 2–14)
MONOCYTES NFR BLD AUTO: 4 % — SIGNIFICANT CHANGE UP (ref 2–14)
MONOCYTES NFR BLD AUTO: 6.5 % — SIGNIFICANT CHANGE UP (ref 2–14)
MONOCYTES NFR BLD AUTO: 6.9 % — SIGNIFICANT CHANGE UP (ref 2–14)
MONOCYTES NFR BLD AUTO: 7 % — SIGNIFICANT CHANGE UP (ref 2–14)
MONOCYTES NFR BLD AUTO: 7 % — SIGNIFICANT CHANGE UP (ref 2–14)
MONOCYTES NFR BLD AUTO: 7.3 % — SIGNIFICANT CHANGE UP (ref 2–14)
MONOCYTES NFR BLD AUTO: 7.8 % — SIGNIFICANT CHANGE UP (ref 2–14)
MONOCYTES NFR BLD AUTO: 8 % — SIGNIFICANT CHANGE UP (ref 2–14)
MONOCYTES NFR BLD AUTO: 8 % — SIGNIFICANT CHANGE UP (ref 2–14)
MONOCYTES NFR BLD AUTO: 8.1 % — SIGNIFICANT CHANGE UP (ref 2–14)
MONOCYTES NFR BLD AUTO: 8.1 % — SIGNIFICANT CHANGE UP (ref 2–14)
MONOCYTES NFR BLD AUTO: 8.2 % — SIGNIFICANT CHANGE UP (ref 2–14)
MONOCYTES NFR BLD AUTO: 8.3 % — SIGNIFICANT CHANGE UP (ref 2–14)
MONOCYTES NFR BLD AUTO: 8.3 % — SIGNIFICANT CHANGE UP (ref 2–14)
MONOCYTES NFR BLD AUTO: 8.6 % — SIGNIFICANT CHANGE UP (ref 2–14)
MONOCYTES NFR BLD AUTO: 9.1 % — SIGNIFICANT CHANGE UP (ref 2–14)
MONOCYTES NFR BLD AUTO: 9.2 % — SIGNIFICANT CHANGE UP (ref 2–14)
MYELOCYTES NFR BLD: 1 % — HIGH (ref 0–0)
MYELOCYTES NFR BLD: 2 % — HIGH (ref 0–0)
NEUTROPHILS # BLD AUTO: 10.14 K/UL — HIGH (ref 1.8–7.4)
NEUTROPHILS # BLD AUTO: 10.66 K/UL — HIGH (ref 1.8–7.4)
NEUTROPHILS # BLD AUTO: 10.82 K/UL — HIGH (ref 1.8–7.4)
NEUTROPHILS # BLD AUTO: 14.04 K/UL — HIGH (ref 1.8–7.4)
NEUTROPHILS # BLD AUTO: 17.19 K/UL — HIGH (ref 1.8–7.4)
NEUTROPHILS # BLD AUTO: 18.05 K/UL — HIGH (ref 1.8–7.4)
NEUTROPHILS # BLD AUTO: 3.28 K/UL — SIGNIFICANT CHANGE UP (ref 1.8–7.4)
NEUTROPHILS # BLD AUTO: 32.03 K/UL — HIGH (ref 1.8–7.4)
NEUTROPHILS # BLD AUTO: 4.14 K/UL — SIGNIFICANT CHANGE UP (ref 1.8–7.4)
NEUTROPHILS # BLD AUTO: 4.7 K/UL — SIGNIFICANT CHANGE UP (ref 1.8–7.4)
NEUTROPHILS # BLD AUTO: 4.83 K/UL — SIGNIFICANT CHANGE UP (ref 1.8–7.4)
NEUTROPHILS # BLD AUTO: 5 K/UL — SIGNIFICANT CHANGE UP (ref 1.8–7.4)
NEUTROPHILS # BLD AUTO: 5.11 K/UL — SIGNIFICANT CHANGE UP (ref 1.8–7.4)
NEUTROPHILS # BLD AUTO: 5.67 K/UL — SIGNIFICANT CHANGE UP (ref 1.8–7.4)
NEUTROPHILS # BLD AUTO: 5.88 K/UL — SIGNIFICANT CHANGE UP (ref 1.8–7.4)
NEUTROPHILS # BLD AUTO: 6.2 K/UL — SIGNIFICANT CHANGE UP (ref 1.8–7.4)
NEUTROPHILS # BLD AUTO: 6.4 K/UL — SIGNIFICANT CHANGE UP (ref 1.8–7.4)
NEUTROPHILS # BLD AUTO: 7.17 K/UL — SIGNIFICANT CHANGE UP (ref 1.8–7.4)
NEUTROPHILS # BLD AUTO: 7.19 K/UL — SIGNIFICANT CHANGE UP (ref 1.8–7.4)
NEUTROPHILS # BLD AUTO: 7.9 K/UL — HIGH (ref 1.8–7.4)
NEUTROPHILS # BLD AUTO: 7.9 K/UL — HIGH (ref 1.8–7.4)
NEUTROPHILS # BLD AUTO: 8.29 K/UL — HIGH (ref 1.8–7.4)
NEUTROPHILS # BLD AUTO: 8.5 K/UL — HIGH (ref 1.8–7.4)
NEUTROPHILS # BLD AUTO: 9 K/UL — HIGH (ref 1.8–7.4)
NEUTROPHILS NFR BLD AUTO: 50.9 % — SIGNIFICANT CHANGE UP (ref 43–77)
NEUTROPHILS NFR BLD AUTO: 57.9 % — SIGNIFICANT CHANGE UP (ref 43–77)
NEUTROPHILS NFR BLD AUTO: 62 % — SIGNIFICANT CHANGE UP (ref 43–77)
NEUTROPHILS NFR BLD AUTO: 63.3 % — SIGNIFICANT CHANGE UP (ref 43–77)
NEUTROPHILS NFR BLD AUTO: 65.3 % — SIGNIFICANT CHANGE UP (ref 43–77)
NEUTROPHILS NFR BLD AUTO: 66.4 % — SIGNIFICANT CHANGE UP (ref 43–77)
NEUTROPHILS NFR BLD AUTO: 68 % — SIGNIFICANT CHANGE UP (ref 43–77)
NEUTROPHILS NFR BLD AUTO: 70.3 % — SIGNIFICANT CHANGE UP (ref 43–77)
NEUTROPHILS NFR BLD AUTO: 71 % — SIGNIFICANT CHANGE UP (ref 43–77)
NEUTROPHILS NFR BLD AUTO: 71.9 % — SIGNIFICANT CHANGE UP (ref 43–77)
NEUTROPHILS NFR BLD AUTO: 73 % — SIGNIFICANT CHANGE UP (ref 43–77)
NEUTROPHILS NFR BLD AUTO: 75.2 % — SIGNIFICANT CHANGE UP (ref 43–77)
NEUTROPHILS NFR BLD AUTO: 75.9 % — SIGNIFICANT CHANGE UP (ref 43–77)
NEUTROPHILS NFR BLD AUTO: 76.8 % — SIGNIFICANT CHANGE UP (ref 43–77)
NEUTROPHILS NFR BLD AUTO: 76.9 % — SIGNIFICANT CHANGE UP (ref 43–77)
NEUTROPHILS NFR BLD AUTO: 77.6 % — HIGH (ref 43–77)
NEUTROPHILS NFR BLD AUTO: 78.6 % — HIGH (ref 43–77)
NEUTROPHILS NFR BLD AUTO: 79.3 % — HIGH (ref 43–77)
NEUTROPHILS NFR BLD AUTO: 81.9 % — HIGH (ref 43–77)
NEUTROPHILS NFR BLD AUTO: 83.5 % — HIGH (ref 43–77)
NEUTROPHILS NFR BLD AUTO: 84.2 % — HIGH (ref 43–77)
NEUTROPHILS NFR BLD AUTO: 85 % — HIGH (ref 43–77)
NEUTROPHILS NFR BLD AUTO: 87.7 % — HIGH (ref 43–77)
NEUTROPHILS NFR BLD AUTO: 93 % — HIGH (ref 43–77)
NEUTS BAND # BLD: 2 % — SIGNIFICANT CHANGE UP (ref 0–8)
NEUTS BAND # BLD: 4 % — SIGNIFICANT CHANGE UP (ref 0–8)
NITRITE UR-MCNC: NEGATIVE — SIGNIFICANT CHANGE UP
NITRITE UR-MCNC: POSITIVE
NRBC # BLD: 0 /100 WBCS — SIGNIFICANT CHANGE UP (ref 0–0)
NRBC # BLD: SIGNIFICANT CHANGE UP /100 WBCS (ref 0–0)
NRBC # BLD: SIGNIFICANT CHANGE UP /100 WBCS (ref 0–0)
NT-PROBNP SERPL-SCNC: 1075 PG/ML — HIGH (ref 0–300)
O2 CT VFR BLDA CALC: 11.4 ML/DL — SIGNIFICANT CHANGE UP (ref 15–23)
O2 CT VFR BLDA CALC: 12.3 ML/DL — SIGNIFICANT CHANGE UP (ref 15–23)
O2 CT VFR BLDA CALC: 15.1 ML/DL — SIGNIFICANT CHANGE UP (ref 15–23)
O2 CT VFR BLDA CALC: SIGNIFICANT CHANGE UP (ref 15–23)
OB PNL STL: POSITIVE
OB PNL STL: POSITIVE
ORGANISM # SPEC MICROSCOPIC CNT: SIGNIFICANT CHANGE UP
OXYHGB MFR BLDA: 98 % — SIGNIFICANT CHANGE UP (ref 94–100)
OXYHGB MFR BLDA: 99 % — SIGNIFICANT CHANGE UP (ref 94–100)
PCO2 BLDA: 29 MMHG — LOW (ref 32–45)
PCO2 BLDA: 31 MMHG — LOW (ref 32–45)
PCO2 BLDA: 35 MMHG — SIGNIFICANT CHANGE UP (ref 32–45)
PCO2 BLDA: 35 MMHG — SIGNIFICANT CHANGE UP (ref 32–45)
PCO2 BLDA: 38 MMHG — SIGNIFICANT CHANGE UP (ref 32–46)
PCO2 BLDA: 39 MMHG — SIGNIFICANT CHANGE UP (ref 32–45)
PCO2 BLDA: 40 MMHG — SIGNIFICANT CHANGE UP (ref 32–46)
PCO2 BLDA: 46 MMHG — HIGH (ref 32–45)
PCO2 BLDA: 49 MMHG — HIGH (ref 32–45)
PCO2 BLDA: 86 MMHG — CRITICAL HIGH (ref 32–45)
PCP SPEC-MCNC: SIGNIFICANT CHANGE UP
PH BLDA: 7 — CRITICAL LOW (ref 7.35–7.45)
PH BLDA: 7.23 — CRITICAL LOW (ref 7.35–7.45)
PH BLDA: 7.31 — LOW (ref 7.35–7.45)
PH BLDA: 7.32 — LOW (ref 7.35–7.45)
PH BLDA: 7.34 — LOW (ref 7.35–7.45)
PH BLDA: 7.36 — SIGNIFICANT CHANGE UP (ref 7.35–7.45)
PH BLDA: 7.36 — SIGNIFICANT CHANGE UP (ref 7.35–7.45)
PH BLDA: 7.4 — SIGNIFICANT CHANGE UP (ref 7.35–7.45)
PH BLDA: 7.42 — SIGNIFICANT CHANGE UP (ref 7.35–7.45)
PH BLDA: 7.42 — SIGNIFICANT CHANGE UP (ref 7.35–7.45)
PH UR: 5 — SIGNIFICANT CHANGE UP (ref 5–8)
PH UR: 6 — SIGNIFICANT CHANGE UP (ref 5–8)
PH UR: 6.5 — SIGNIFICANT CHANGE UP (ref 5–8)
PH UR: 7 — SIGNIFICANT CHANGE UP (ref 5–8)
PH UR: 7 — SIGNIFICANT CHANGE UP (ref 5–8)
PH UR: 7.5 — SIGNIFICANT CHANGE UP (ref 5–8)
PH UR: 7.5 — SIGNIFICANT CHANGE UP (ref 5–8)
PHOSPHATE SERPL-MCNC: 1.5 MG/DL — LOW (ref 2.5–4.5)
PHOSPHATE SERPL-MCNC: 1.6 MG/DL — LOW (ref 2.5–4.5)
PHOSPHATE SERPL-MCNC: 2 MG/DL — LOW (ref 2.5–4.5)
PHOSPHATE SERPL-MCNC: 2 MG/DL — LOW (ref 2.5–4.5)
PHOSPHATE SERPL-MCNC: 2.2 MG/DL — LOW (ref 2.5–4.5)
PHOSPHATE SERPL-MCNC: 2.2 MG/DL — LOW (ref 2.5–4.5)
PHOSPHATE SERPL-MCNC: 2.4 MG/DL — LOW (ref 2.5–4.5)
PHOSPHATE SERPL-MCNC: 2.5 MG/DL — SIGNIFICANT CHANGE UP (ref 2.5–4.5)
PHOSPHATE SERPL-MCNC: 2.6 MG/DL — SIGNIFICANT CHANGE UP (ref 2.5–4.5)
PHOSPHATE SERPL-MCNC: 2.7 MG/DL — SIGNIFICANT CHANGE UP (ref 2.5–4.5)
PHOSPHATE SERPL-MCNC: 2.8 MG/DL — SIGNIFICANT CHANGE UP (ref 2.5–4.5)
PHOSPHATE SERPL-MCNC: 2.9 MG/DL — SIGNIFICANT CHANGE UP (ref 2.5–4.5)
PHOSPHATE SERPL-MCNC: 3 MG/DL — SIGNIFICANT CHANGE UP (ref 2.5–4.5)
PHOSPHATE SERPL-MCNC: 3.1 MG/DL — SIGNIFICANT CHANGE UP (ref 2.5–4.5)
PHOSPHATE SERPL-MCNC: 3.2 MG/DL — SIGNIFICANT CHANGE UP (ref 2.5–4.5)
PHOSPHATE SERPL-MCNC: 3.3 MG/DL — SIGNIFICANT CHANGE UP (ref 2.5–4.5)
PHOSPHATE SERPL-MCNC: 3.4 MG/DL — SIGNIFICANT CHANGE UP (ref 2.5–4.5)
PHOSPHATE SERPL-MCNC: 3.5 MG/DL — SIGNIFICANT CHANGE UP (ref 2.5–4.5)
PHOSPHATE SERPL-MCNC: 3.5 MG/DL — SIGNIFICANT CHANGE UP (ref 2.5–4.5)
PHOSPHATE SERPL-MCNC: 3.6 MG/DL — SIGNIFICANT CHANGE UP (ref 2.5–4.5)
PHOSPHATE SERPL-MCNC: 3.7 MG/DL — SIGNIFICANT CHANGE UP (ref 2.5–4.5)
PHOSPHATE SERPL-MCNC: 3.7 MG/DL — SIGNIFICANT CHANGE UP (ref 2.5–4.5)
PHOSPHATE SERPL-MCNC: 4 MG/DL — SIGNIFICANT CHANGE UP (ref 2.5–4.5)
PHOSPHATE SERPL-MCNC: 4.8 MG/DL — HIGH (ref 2.5–4.5)
PHOSPHATE SERPL-MCNC: 5 MG/DL — HIGH (ref 2.5–4.5)
PHOSPHATE SERPL-MCNC: 9.7 MG/DL — HIGH (ref 2.5–4.5)
PLAT MORPH BLD: NORMAL — SIGNIFICANT CHANGE UP
PLAT MORPH BLD: NORMAL — SIGNIFICANT CHANGE UP
PLATELET # BLD AUTO: 104 K/UL — LOW (ref 150–400)
PLATELET # BLD AUTO: 110 K/UL — LOW (ref 150–400)
PLATELET # BLD AUTO: 111 K/UL — LOW (ref 150–400)
PLATELET # BLD AUTO: 119 K/UL — LOW (ref 150–400)
PLATELET # BLD AUTO: 123 K/UL — LOW (ref 150–400)
PLATELET # BLD AUTO: 123 K/UL — LOW (ref 150–400)
PLATELET # BLD AUTO: 152 K/UL — SIGNIFICANT CHANGE UP (ref 150–400)
PLATELET # BLD AUTO: 153 K/UL — SIGNIFICANT CHANGE UP (ref 150–400)
PLATELET # BLD AUTO: 159 K/UL — SIGNIFICANT CHANGE UP (ref 150–400)
PLATELET # BLD AUTO: 163 K/UL — SIGNIFICANT CHANGE UP (ref 150–400)
PLATELET # BLD AUTO: 166 K/UL — SIGNIFICANT CHANGE UP (ref 150–400)
PLATELET # BLD AUTO: 169 K/UL — SIGNIFICANT CHANGE UP (ref 150–400)
PLATELET # BLD AUTO: 170 K/UL — SIGNIFICANT CHANGE UP (ref 150–400)
PLATELET # BLD AUTO: 171 K/UL — SIGNIFICANT CHANGE UP (ref 150–400)
PLATELET # BLD AUTO: 179 K/UL — SIGNIFICANT CHANGE UP (ref 150–400)
PLATELET # BLD AUTO: 181 K/UL — SIGNIFICANT CHANGE UP (ref 150–400)
PLATELET # BLD AUTO: 184 K/UL — SIGNIFICANT CHANGE UP (ref 150–400)
PLATELET # BLD AUTO: 187 K/UL — SIGNIFICANT CHANGE UP (ref 150–400)
PLATELET # BLD AUTO: 191 K/UL — SIGNIFICANT CHANGE UP (ref 150–400)
PLATELET # BLD AUTO: 191 K/UL — SIGNIFICANT CHANGE UP (ref 150–400)
PLATELET # BLD AUTO: 192 K/UL — SIGNIFICANT CHANGE UP (ref 150–400)
PLATELET # BLD AUTO: 193 K/UL — SIGNIFICANT CHANGE UP (ref 150–400)
PLATELET # BLD AUTO: 199 K/UL — SIGNIFICANT CHANGE UP (ref 150–400)
PLATELET # BLD AUTO: 200 K/UL — SIGNIFICANT CHANGE UP (ref 150–400)
PLATELET # BLD AUTO: 203 K/UL — SIGNIFICANT CHANGE UP (ref 150–400)
PLATELET # BLD AUTO: 204 K/UL — SIGNIFICANT CHANGE UP (ref 150–400)
PLATELET # BLD AUTO: 204 K/UL — SIGNIFICANT CHANGE UP (ref 150–400)
PLATELET # BLD AUTO: 207 K/UL — SIGNIFICANT CHANGE UP (ref 150–400)
PLATELET # BLD AUTO: 209 K/UL — SIGNIFICANT CHANGE UP (ref 150–400)
PLATELET # BLD AUTO: 211 K/UL — SIGNIFICANT CHANGE UP (ref 150–400)
PLATELET # BLD AUTO: 212 K/UL — SIGNIFICANT CHANGE UP (ref 150–400)
PLATELET # BLD AUTO: 214 K/UL — SIGNIFICANT CHANGE UP (ref 150–400)
PLATELET # BLD AUTO: 214 K/UL — SIGNIFICANT CHANGE UP (ref 150–400)
PLATELET # BLD AUTO: 215 K/UL — SIGNIFICANT CHANGE UP (ref 150–400)
PLATELET # BLD AUTO: 215 K/UL — SIGNIFICANT CHANGE UP (ref 150–400)
PLATELET # BLD AUTO: 216 K/UL — SIGNIFICANT CHANGE UP (ref 150–400)
PLATELET # BLD AUTO: 218 K/UL — SIGNIFICANT CHANGE UP (ref 150–400)
PLATELET # BLD AUTO: 219 K/UL — SIGNIFICANT CHANGE UP (ref 150–400)
PLATELET # BLD AUTO: 221 K/UL — SIGNIFICANT CHANGE UP (ref 150–400)
PLATELET # BLD AUTO: 221 K/UL — SIGNIFICANT CHANGE UP (ref 150–400)
PLATELET # BLD AUTO: 222 K/UL — SIGNIFICANT CHANGE UP (ref 150–400)
PLATELET # BLD AUTO: 225 K/UL — SIGNIFICANT CHANGE UP (ref 150–400)
PLATELET # BLD AUTO: 225 K/UL — SIGNIFICANT CHANGE UP (ref 150–400)
PLATELET # BLD AUTO: 229 K/UL — SIGNIFICANT CHANGE UP (ref 150–400)
PLATELET # BLD AUTO: 229 K/UL — SIGNIFICANT CHANGE UP (ref 150–400)
PLATELET # BLD AUTO: 230 K/UL — SIGNIFICANT CHANGE UP (ref 150–400)
PLATELET # BLD AUTO: 231 K/UL — SIGNIFICANT CHANGE UP (ref 150–400)
PLATELET # BLD AUTO: 232 K/UL — SIGNIFICANT CHANGE UP (ref 150–400)
PLATELET # BLD AUTO: 232 K/UL — SIGNIFICANT CHANGE UP (ref 150–400)
PLATELET # BLD AUTO: 234 K/UL — SIGNIFICANT CHANGE UP (ref 150–400)
PLATELET # BLD AUTO: 235 K/UL — SIGNIFICANT CHANGE UP (ref 150–400)
PLATELET # BLD AUTO: 235 K/UL — SIGNIFICANT CHANGE UP (ref 150–400)
PLATELET # BLD AUTO: 237 K/UL — SIGNIFICANT CHANGE UP (ref 150–400)
PLATELET # BLD AUTO: 240 K/UL — SIGNIFICANT CHANGE UP (ref 150–400)
PLATELET # BLD AUTO: 242 K/UL — SIGNIFICANT CHANGE UP (ref 150–400)
PLATELET # BLD AUTO: 243 K/UL — SIGNIFICANT CHANGE UP (ref 150–400)
PLATELET # BLD AUTO: 244 K/UL — SIGNIFICANT CHANGE UP (ref 150–400)
PLATELET # BLD AUTO: 247 K/UL — SIGNIFICANT CHANGE UP (ref 150–400)
PLATELET # BLD AUTO: 253 K/UL — SIGNIFICANT CHANGE UP (ref 150–400)
PLATELET # BLD AUTO: 254 K/UL — SIGNIFICANT CHANGE UP (ref 150–400)
PLATELET # BLD AUTO: 255 K/UL — SIGNIFICANT CHANGE UP (ref 150–400)
PLATELET # BLD AUTO: 256 K/UL — SIGNIFICANT CHANGE UP (ref 150–400)
PLATELET # BLD AUTO: 258 K/UL — SIGNIFICANT CHANGE UP (ref 150–400)
PLATELET # BLD AUTO: 258 K/UL — SIGNIFICANT CHANGE UP (ref 150–400)
PLATELET # BLD AUTO: 259 K/UL — SIGNIFICANT CHANGE UP (ref 150–400)
PLATELET # BLD AUTO: 262 K/UL — SIGNIFICANT CHANGE UP (ref 150–400)
PLATELET # BLD AUTO: 264 K/UL — SIGNIFICANT CHANGE UP (ref 150–400)
PLATELET # BLD AUTO: 265 K/UL — SIGNIFICANT CHANGE UP (ref 150–400)
PLATELET # BLD AUTO: 267 K/UL — SIGNIFICANT CHANGE UP (ref 150–400)
PLATELET # BLD AUTO: 268 K/UL — SIGNIFICANT CHANGE UP (ref 150–400)
PLATELET # BLD AUTO: 269 K/UL — SIGNIFICANT CHANGE UP (ref 150–400)
PLATELET # BLD AUTO: 270 K/UL — SIGNIFICANT CHANGE UP (ref 150–400)
PLATELET # BLD AUTO: 271 K/UL — SIGNIFICANT CHANGE UP (ref 150–400)
PLATELET # BLD AUTO: 272 K/UL — SIGNIFICANT CHANGE UP (ref 150–400)
PLATELET # BLD AUTO: 273 K/UL — SIGNIFICANT CHANGE UP (ref 150–400)
PLATELET # BLD AUTO: 273 K/UL — SIGNIFICANT CHANGE UP (ref 150–400)
PLATELET # BLD AUTO: 274 K/UL — SIGNIFICANT CHANGE UP (ref 150–400)
PLATELET # BLD AUTO: 275 K/UL — SIGNIFICANT CHANGE UP (ref 150–400)
PLATELET # BLD AUTO: 276 K/UL — SIGNIFICANT CHANGE UP (ref 150–400)
PLATELET # BLD AUTO: 276 K/UL — SIGNIFICANT CHANGE UP (ref 150–400)
PLATELET # BLD AUTO: 279 K/UL — SIGNIFICANT CHANGE UP (ref 150–400)
PLATELET # BLD AUTO: 280 K/UL — SIGNIFICANT CHANGE UP (ref 150–400)
PLATELET # BLD AUTO: 281 K/UL — SIGNIFICANT CHANGE UP (ref 150–400)
PLATELET # BLD AUTO: 281 K/UL — SIGNIFICANT CHANGE UP (ref 150–400)
PLATELET # BLD AUTO: 282 K/UL — SIGNIFICANT CHANGE UP (ref 150–400)
PLATELET # BLD AUTO: 283 K/UL — SIGNIFICANT CHANGE UP (ref 150–400)
PLATELET # BLD AUTO: 283 K/UL — SIGNIFICANT CHANGE UP (ref 150–400)
PLATELET # BLD AUTO: 284 K/UL — SIGNIFICANT CHANGE UP (ref 150–400)
PLATELET # BLD AUTO: 287 K/UL — SIGNIFICANT CHANGE UP (ref 150–400)
PLATELET # BLD AUTO: 287 K/UL — SIGNIFICANT CHANGE UP (ref 150–400)
PLATELET # BLD AUTO: 288 K/UL — SIGNIFICANT CHANGE UP (ref 150–400)
PLATELET # BLD AUTO: 290 K/UL — SIGNIFICANT CHANGE UP (ref 150–400)
PLATELET # BLD AUTO: 292 K/UL — SIGNIFICANT CHANGE UP (ref 150–400)
PLATELET # BLD AUTO: 294 K/UL — SIGNIFICANT CHANGE UP (ref 150–400)
PLATELET # BLD AUTO: 295 K/UL — SIGNIFICANT CHANGE UP (ref 150–400)
PLATELET # BLD AUTO: 296 K/UL — SIGNIFICANT CHANGE UP (ref 150–400)
PLATELET # BLD AUTO: 297 K/UL — SIGNIFICANT CHANGE UP (ref 150–400)
PLATELET # BLD AUTO: 297 K/UL — SIGNIFICANT CHANGE UP (ref 150–400)
PLATELET # BLD AUTO: 300 K/UL — SIGNIFICANT CHANGE UP (ref 150–400)
PLATELET # BLD AUTO: 302 K/UL — SIGNIFICANT CHANGE UP (ref 150–400)
PLATELET # BLD AUTO: 304 K/UL — SIGNIFICANT CHANGE UP (ref 150–400)
PLATELET # BLD AUTO: 306 K/UL — SIGNIFICANT CHANGE UP (ref 150–400)
PLATELET # BLD AUTO: 306 K/UL — SIGNIFICANT CHANGE UP (ref 150–400)
PLATELET # BLD AUTO: 307 K/UL — SIGNIFICANT CHANGE UP (ref 150–400)
PLATELET # BLD AUTO: 309 K/UL — SIGNIFICANT CHANGE UP (ref 150–400)
PLATELET # BLD AUTO: 314 K/UL — SIGNIFICANT CHANGE UP (ref 150–400)
PLATELET # BLD AUTO: 316 K/UL — SIGNIFICANT CHANGE UP (ref 150–400)
PLATELET # BLD AUTO: 319 K/UL — SIGNIFICANT CHANGE UP (ref 150–400)
PLATELET # BLD AUTO: 320 K/UL — SIGNIFICANT CHANGE UP (ref 150–400)
PLATELET # BLD AUTO: 321 K/UL — SIGNIFICANT CHANGE UP (ref 150–400)
PLATELET # BLD AUTO: 324 K/UL — SIGNIFICANT CHANGE UP (ref 150–400)
PLATELET # BLD AUTO: 325 K/UL — SIGNIFICANT CHANGE UP (ref 150–400)
PLATELET # BLD AUTO: 326 K/UL — SIGNIFICANT CHANGE UP (ref 150–400)
PLATELET # BLD AUTO: 327 K/UL — SIGNIFICANT CHANGE UP (ref 150–400)
PLATELET # BLD AUTO: 330 K/UL — SIGNIFICANT CHANGE UP (ref 150–400)
PLATELET # BLD AUTO: 331 K/UL — SIGNIFICANT CHANGE UP (ref 150–400)
PLATELET # BLD AUTO: 336 K/UL — SIGNIFICANT CHANGE UP (ref 150–400)
PLATELET # BLD AUTO: 343 K/UL — SIGNIFICANT CHANGE UP (ref 150–400)
PLATELET # BLD AUTO: 343 K/UL — SIGNIFICANT CHANGE UP (ref 150–400)
PLATELET # BLD AUTO: 344 K/UL — SIGNIFICANT CHANGE UP (ref 150–400)
PLATELET # BLD AUTO: 346 K/UL — SIGNIFICANT CHANGE UP (ref 150–400)
PLATELET # BLD AUTO: 347 K/UL — SIGNIFICANT CHANGE UP (ref 150–400)
PLATELET # BLD AUTO: 348 K/UL — SIGNIFICANT CHANGE UP (ref 150–400)
PLATELET # BLD AUTO: 352 K/UL — SIGNIFICANT CHANGE UP (ref 150–400)
PLATELET # BLD AUTO: 356 K/UL — SIGNIFICANT CHANGE UP (ref 150–400)
PLATELET # BLD AUTO: 360 K/UL — SIGNIFICANT CHANGE UP (ref 150–400)
PLATELET # BLD AUTO: 360 K/UL — SIGNIFICANT CHANGE UP (ref 150–400)
PLATELET # BLD AUTO: 362 K/UL — SIGNIFICANT CHANGE UP (ref 150–400)
PLATELET # BLD AUTO: 363 K/UL — SIGNIFICANT CHANGE UP (ref 150–400)
PLATELET # BLD AUTO: 364 K/UL — SIGNIFICANT CHANGE UP (ref 150–400)
PLATELET # BLD AUTO: 369 K/UL — SIGNIFICANT CHANGE UP (ref 150–400)
PLATELET # BLD AUTO: 371 K/UL — SIGNIFICANT CHANGE UP (ref 150–400)
PLATELET # BLD AUTO: 372 K/UL — SIGNIFICANT CHANGE UP (ref 150–400)
PLATELET # BLD AUTO: 372 K/UL — SIGNIFICANT CHANGE UP (ref 150–400)
PLATELET # BLD AUTO: 374 K/UL — SIGNIFICANT CHANGE UP (ref 150–400)
PLATELET # BLD AUTO: 381 K/UL — SIGNIFICANT CHANGE UP (ref 150–400)
PLATELET # BLD AUTO: 381 K/UL — SIGNIFICANT CHANGE UP (ref 150–400)
PLATELET # BLD AUTO: 383 K/UL — SIGNIFICANT CHANGE UP (ref 150–400)
PLATELET # BLD AUTO: 384 K/UL — SIGNIFICANT CHANGE UP (ref 150–400)
PLATELET # BLD AUTO: 390 K/UL — SIGNIFICANT CHANGE UP (ref 150–400)
PLATELET # BLD AUTO: 392 K/UL — SIGNIFICANT CHANGE UP (ref 150–400)
PLATELET # BLD AUTO: 395 K/UL — SIGNIFICANT CHANGE UP (ref 150–400)
PLATELET # BLD AUTO: 401 K/UL — HIGH (ref 150–400)
PLATELET # BLD AUTO: 402 K/UL — HIGH (ref 150–400)
PLATELET # BLD AUTO: 404 K/UL — HIGH (ref 150–400)
PLATELET # BLD AUTO: 404 K/UL — HIGH (ref 150–400)
PLATELET # BLD AUTO: 406 K/UL — HIGH (ref 150–400)
PLATELET # BLD AUTO: 408 K/UL — HIGH (ref 150–400)
PLATELET # BLD AUTO: 410 K/UL — HIGH (ref 150–400)
PLATELET # BLD AUTO: 416 K/UL — HIGH (ref 150–400)
PLATELET # BLD AUTO: 417 K/UL — HIGH (ref 150–400)
PLATELET # BLD AUTO: 419 K/UL — HIGH (ref 150–400)
PLATELET # BLD AUTO: 420 K/UL — HIGH (ref 150–400)
PLATELET # BLD AUTO: 438 K/UL — HIGH (ref 150–400)
PLATELET # BLD AUTO: 439 K/UL — HIGH (ref 150–400)
PLATELET # BLD AUTO: 439 K/UL — HIGH (ref 150–400)
PLATELET # BLD AUTO: 440 K/UL — HIGH (ref 150–400)
PLATELET # BLD AUTO: 450 K/UL — HIGH (ref 150–400)
PLATELET # BLD AUTO: 451 K/UL — HIGH (ref 150–400)
PLATELET # BLD AUTO: 452 K/UL — HIGH (ref 150–400)
PLATELET # BLD AUTO: 457 K/UL — HIGH (ref 150–400)
PLATELET # BLD AUTO: 472 K/UL — HIGH (ref 150–400)
PLATELET # BLD AUTO: 476 K/UL — HIGH (ref 150–400)
PLATELET # BLD AUTO: 476 K/UL — HIGH (ref 150–400)
PLATELET # BLD AUTO: 538 K/UL — HIGH (ref 150–400)
PLATELET # BLD AUTO: 550 K/UL — HIGH (ref 150–400)
PLATELET # BLD AUTO: 556 K/UL — HIGH (ref 150–400)
PLATELET # BLD AUTO: 569 K/UL — HIGH (ref 150–400)
PLATELET # BLD AUTO: 578 K/UL — HIGH (ref 150–400)
PLATELET # BLD AUTO: 589 K/UL — HIGH (ref 150–400)
PLATELET # BLD AUTO: 593 K/UL — HIGH (ref 150–400)
PLATELET # BLD AUTO: 599 K/UL — HIGH (ref 150–400)
PLATELET # BLD AUTO: 612 K/UL — HIGH (ref 150–400)
PLATELET # BLD AUTO: 68 K/UL — LOW (ref 150–400)
PLATELET # BLD AUTO: 71 K/UL — LOW (ref 150–400)
PLATELET # BLD AUTO: 77 K/UL — LOW (ref 150–400)
PLATELET # BLD AUTO: 79 K/UL — LOW (ref 150–400)
PLATELET # BLD AUTO: 81 K/UL — LOW (ref 150–400)
PLATELET # BLD AUTO: 90 K/UL — LOW (ref 150–400)
PLATELET # BLD AUTO: 90 K/UL — LOW (ref 150–400)
PLATELET # BLD AUTO: 92 K/UL — LOW (ref 150–400)
PLATELET # BLD AUTO: 93 K/UL — LOW (ref 150–400)
PLATELET # BLD AUTO: 96 K/UL — LOW (ref 150–400)
PO2 BLDA: 143 MMHG — HIGH (ref 74–108)
PO2 BLDA: 189 MMHG — HIGH (ref 74–108)
PO2 BLDA: 189 MMHG — HIGH (ref 83–108)
PO2 BLDA: 214 MMHG — HIGH (ref 83–108)
PO2 BLDA: 216 MMHG — HIGH (ref 83–108)
PO2 BLDA: 242 MMHG — HIGH (ref 83–108)
PO2 BLDA: 250 MMHG — HIGH (ref 83–108)
PO2 BLDA: 255 MMHG — HIGH (ref 83–108)
PO2 BLDA: 354 MMHG — HIGH (ref 83–108)
PO2 BLDA: 422 MMHG — HIGH (ref 83–108)
POIKILOCYTOSIS BLD QL AUTO: SLIGHT — SIGNIFICANT CHANGE UP
POIKILOCYTOSIS BLD QL AUTO: SLIGHT — SIGNIFICANT CHANGE UP
POLYCHROMASIA BLD QL SMEAR: SLIGHT — SIGNIFICANT CHANGE UP
POTASSIUM BLDA-SCNC: 3.3 MMOL/L — LOW (ref 3.5–4.9)
POTASSIUM BLDA-SCNC: 3.5 MMOL/L — SIGNIFICANT CHANGE UP (ref 3.5–4.9)
POTASSIUM BLDA-SCNC: 3.8 MMOL/L — SIGNIFICANT CHANGE UP (ref 3.5–4.9)
POTASSIUM BLDA-SCNC: 5.4 MMOL/L — HIGH (ref 3.5–4.9)
POTASSIUM SERPL-MCNC: 2.9 MMOL/L — CRITICAL LOW (ref 3.5–5.3)
POTASSIUM SERPL-MCNC: 3.1 MMOL/L — LOW (ref 3.5–5.3)
POTASSIUM SERPL-MCNC: 3.1 MMOL/L — LOW (ref 3.5–5.3)
POTASSIUM SERPL-MCNC: 3.2 MMOL/L — LOW (ref 3.5–5.3)
POTASSIUM SERPL-MCNC: 3.2 MMOL/L — LOW (ref 3.5–5.3)
POTASSIUM SERPL-MCNC: 3.3 MMOL/L — LOW (ref 3.5–5.3)
POTASSIUM SERPL-MCNC: 3.4 MMOL/L — LOW (ref 3.5–5.3)
POTASSIUM SERPL-MCNC: 3.5 MMOL/L — SIGNIFICANT CHANGE UP (ref 3.5–5.3)
POTASSIUM SERPL-MCNC: 3.6 MMOL/L — SIGNIFICANT CHANGE UP (ref 3.5–5.3)
POTASSIUM SERPL-MCNC: 3.7 MMOL/L — SIGNIFICANT CHANGE UP (ref 3.5–5.3)
POTASSIUM SERPL-MCNC: 3.8 MMOL/L — SIGNIFICANT CHANGE UP (ref 3.5–5.3)
POTASSIUM SERPL-MCNC: 3.9 MMOL/L — SIGNIFICANT CHANGE UP (ref 3.5–5.3)
POTASSIUM SERPL-MCNC: 4 MMOL/L — SIGNIFICANT CHANGE UP (ref 3.5–5.3)
POTASSIUM SERPL-MCNC: 4.1 MMOL/L — SIGNIFICANT CHANGE UP (ref 3.5–5.3)
POTASSIUM SERPL-MCNC: 4.2 MMOL/L — SIGNIFICANT CHANGE UP (ref 3.5–5.3)
POTASSIUM SERPL-MCNC: 4.3 MMOL/L — SIGNIFICANT CHANGE UP (ref 3.5–5.3)
POTASSIUM SERPL-MCNC: 4.4 MMOL/L — SIGNIFICANT CHANGE UP (ref 3.5–5.3)
POTASSIUM SERPL-MCNC: 4.5 MMOL/L — SIGNIFICANT CHANGE UP (ref 3.5–5.3)
POTASSIUM SERPL-MCNC: 4.6 MMOL/L — SIGNIFICANT CHANGE UP (ref 3.5–5.3)
POTASSIUM SERPL-MCNC: 4.7 MMOL/L — SIGNIFICANT CHANGE UP (ref 3.5–5.3)
POTASSIUM SERPL-MCNC: 4.8 MMOL/L — SIGNIFICANT CHANGE UP (ref 3.5–5.3)
POTASSIUM SERPL-MCNC: 4.8 MMOL/L — SIGNIFICANT CHANGE UP (ref 3.5–5.3)
POTASSIUM SERPL-MCNC: 4.9 MMOL/L — SIGNIFICANT CHANGE UP (ref 3.5–5.3)
POTASSIUM SERPL-MCNC: 4.9 MMOL/L — SIGNIFICANT CHANGE UP (ref 3.5–5.3)
POTASSIUM SERPL-MCNC: 5.1 MMOL/L — SIGNIFICANT CHANGE UP (ref 3.5–5.3)
POTASSIUM SERPL-MCNC: 5.1 MMOL/L — SIGNIFICANT CHANGE UP (ref 3.5–5.3)
POTASSIUM SERPL-MCNC: 5.7 MMOL/L — HIGH (ref 3.5–5.3)
POTASSIUM SERPL-MCNC: 5.9 MMOL/L — HIGH (ref 3.5–5.3)
POTASSIUM SERPL-MCNC: 6.9 MMOL/L — CRITICAL HIGH (ref 3.5–5.3)
POTASSIUM SERPL-MCNC: SIGNIFICANT CHANGE UP (ref 3.5–5.3)
POTASSIUM SERPL-SCNC: 2.9 MMOL/L — CRITICAL LOW (ref 3.5–5.3)
POTASSIUM SERPL-SCNC: 3.1 MMOL/L — LOW (ref 3.5–5.3)
POTASSIUM SERPL-SCNC: 3.1 MMOL/L — LOW (ref 3.5–5.3)
POTASSIUM SERPL-SCNC: 3.2 MMOL/L — LOW (ref 3.5–5.3)
POTASSIUM SERPL-SCNC: 3.2 MMOL/L — LOW (ref 3.5–5.3)
POTASSIUM SERPL-SCNC: 3.3 MMOL/L — LOW (ref 3.5–5.3)
POTASSIUM SERPL-SCNC: 3.4 MMOL/L — LOW (ref 3.5–5.3)
POTASSIUM SERPL-SCNC: 3.5 MMOL/L — SIGNIFICANT CHANGE UP (ref 3.5–5.3)
POTASSIUM SERPL-SCNC: 3.6 MMOL/L — SIGNIFICANT CHANGE UP (ref 3.5–5.3)
POTASSIUM SERPL-SCNC: 3.7 MMOL/L — SIGNIFICANT CHANGE UP (ref 3.5–5.3)
POTASSIUM SERPL-SCNC: 3.8 MMOL/L — SIGNIFICANT CHANGE UP (ref 3.5–5.3)
POTASSIUM SERPL-SCNC: 3.9 MMOL/L — SIGNIFICANT CHANGE UP (ref 3.5–5.3)
POTASSIUM SERPL-SCNC: 4 MMOL/L — SIGNIFICANT CHANGE UP (ref 3.5–5.3)
POTASSIUM SERPL-SCNC: 4.1 MMOL/L — SIGNIFICANT CHANGE UP (ref 3.5–5.3)
POTASSIUM SERPL-SCNC: 4.2 MMOL/L — SIGNIFICANT CHANGE UP (ref 3.5–5.3)
POTASSIUM SERPL-SCNC: 4.3 MMOL/L — SIGNIFICANT CHANGE UP (ref 3.5–5.3)
POTASSIUM SERPL-SCNC: 4.4 MMOL/L — SIGNIFICANT CHANGE UP (ref 3.5–5.3)
POTASSIUM SERPL-SCNC: 4.5 MMOL/L — SIGNIFICANT CHANGE UP (ref 3.5–5.3)
POTASSIUM SERPL-SCNC: 4.6 MMOL/L — SIGNIFICANT CHANGE UP (ref 3.5–5.3)
POTASSIUM SERPL-SCNC: 4.7 MMOL/L — SIGNIFICANT CHANGE UP (ref 3.5–5.3)
POTASSIUM SERPL-SCNC: 4.8 MMOL/L — SIGNIFICANT CHANGE UP (ref 3.5–5.3)
POTASSIUM SERPL-SCNC: 4.8 MMOL/L — SIGNIFICANT CHANGE UP (ref 3.5–5.3)
POTASSIUM SERPL-SCNC: 4.9 MMOL/L — SIGNIFICANT CHANGE UP (ref 3.5–5.3)
POTASSIUM SERPL-SCNC: 4.9 MMOL/L — SIGNIFICANT CHANGE UP (ref 3.5–5.3)
POTASSIUM SERPL-SCNC: 5.1 MMOL/L — SIGNIFICANT CHANGE UP (ref 3.5–5.3)
POTASSIUM SERPL-SCNC: 5.1 MMOL/L — SIGNIFICANT CHANGE UP (ref 3.5–5.3)
POTASSIUM SERPL-SCNC: 5.7 MMOL/L — HIGH (ref 3.5–5.3)
POTASSIUM SERPL-SCNC: 5.9 MMOL/L — HIGH (ref 3.5–5.3)
POTASSIUM SERPL-SCNC: 6.9 MMOL/L — CRITICAL HIGH (ref 3.5–5.3)
POTASSIUM SERPL-SCNC: SIGNIFICANT CHANGE UP (ref 3.5–5.3)
PROCALCITONIN SERPL-MCNC: 0.35 NG/ML — HIGH (ref 0.02–0.1)
PROT SERPL-MCNC: 3.5 G/DL — LOW (ref 6–8.3)
PROT SERPL-MCNC: 3.6 G/DL — LOW (ref 6–8.3)
PROT SERPL-MCNC: 4 G/DL — LOW (ref 6–8.3)
PROT SERPL-MCNC: 4 G/DL — LOW (ref 6–8.3)
PROT SERPL-MCNC: 4.4 G/DL — LOW (ref 6–8.3)
PROT SERPL-MCNC: 4.5 G/DL — LOW (ref 6–8.3)
PROT SERPL-MCNC: 4.5 G/DL — LOW (ref 6–8.3)
PROT SERPL-MCNC: 4.6 G/DL — LOW (ref 6–8.3)
PROT SERPL-MCNC: 4.7 G/DL — LOW (ref 6–8.3)
PROT SERPL-MCNC: 4.7 GM/DL — LOW (ref 6–8.3)
PROT SERPL-MCNC: 4.8 G/DL — LOW (ref 6–8.3)
PROT SERPL-MCNC: 4.8 G/DL — LOW (ref 6–8.3)
PROT SERPL-MCNC: 4.9 G/DL — LOW (ref 6–8.3)
PROT SERPL-MCNC: 5 G/DL — LOW (ref 6–8.3)
PROT SERPL-MCNC: 5 G/DL — LOW (ref 6–8.3)
PROT SERPL-MCNC: 5 GM/DL — LOW (ref 6–8.3)
PROT SERPL-MCNC: 5.2 G/DL — LOW (ref 6–8.3)
PROT SERPL-MCNC: 5.2 G/DL — LOW (ref 6–8.3)
PROT SERPL-MCNC: 5.2 GM/DL — LOW (ref 6–8.3)
PROT SERPL-MCNC: 5.3 G/DL — LOW (ref 6–8.3)
PROT SERPL-MCNC: 5.3 GM/DL — LOW (ref 6–8.3)
PROT SERPL-MCNC: 5.4 G/DL — LOW (ref 6–8.3)
PROT SERPL-MCNC: 5.4 G/DL — LOW (ref 6–8.3)
PROT SERPL-MCNC: 5.5 G/DL — LOW (ref 6–8.3)
PROT SERPL-MCNC: 5.5 GM/DL — LOW (ref 6–8.3)
PROT SERPL-MCNC: 5.5 GM/DL — LOW (ref 6–8.3)
PROT SERPL-MCNC: 5.8 GM/DL — LOW (ref 6–8.3)
PROT SERPL-MCNC: 6 G/DL — SIGNIFICANT CHANGE UP (ref 6–8.3)
PROT SERPL-MCNC: 6.4 GM/DL — SIGNIFICANT CHANGE UP (ref 6–8.3)
PROT UR-MCNC: 100 MG/DL
PROT UR-MCNC: 30 MG/DL
PROT UR-MCNC: ABNORMAL
PROT UR-MCNC: NEGATIVE MG/DL — SIGNIFICANT CHANGE UP
PROT UR-MCNC: SIGNIFICANT CHANGE UP
PROTHROM AB SERPL-ACNC: 11.2 SEC — SIGNIFICANT CHANGE UP (ref 10–12.9)
PROTHROM AB SERPL-ACNC: 11.2 SEC — SIGNIFICANT CHANGE UP (ref 10–12.9)
PROTHROM AB SERPL-ACNC: 11.3 SEC — SIGNIFICANT CHANGE UP (ref 10–12.9)
PROTHROM AB SERPL-ACNC: 11.3 SEC — SIGNIFICANT CHANGE UP (ref 10–13.1)
PROTHROM AB SERPL-ACNC: 11.4 SEC — SIGNIFICANT CHANGE UP (ref 10–13.1)
PROTHROM AB SERPL-ACNC: 11.7 SEC — SIGNIFICANT CHANGE UP (ref 10–12.9)
PROTHROM AB SERPL-ACNC: 11.9 SEC — SIGNIFICANT CHANGE UP (ref 10–12.9)
PROTHROM AB SERPL-ACNC: 12.1 SEC — SIGNIFICANT CHANGE UP (ref 10–12.9)
PROTHROM AB SERPL-ACNC: 12.3 SEC — SIGNIFICANT CHANGE UP (ref 10–12.9)
PROTHROM AB SERPL-ACNC: 12.4 SEC — SIGNIFICANT CHANGE UP (ref 10–12.9)
PROTHROM AB SERPL-ACNC: 12.6 SEC — SIGNIFICANT CHANGE UP (ref 10–12.9)
PROTHROM AB SERPL-ACNC: 12.7 SEC — SIGNIFICANT CHANGE UP (ref 10–12.9)
PROTHROM AB SERPL-ACNC: 12.9 SEC — SIGNIFICANT CHANGE UP (ref 10–12.9)
PROTHROM AB SERPL-ACNC: 13 SEC — HIGH (ref 10–12.9)
PROTHROM AB SERPL-ACNC: 13.1 SEC — HIGH (ref 10–12.9)
PROTHROM AB SERPL-ACNC: 13.2 SEC — HIGH (ref 10–12.9)
PROTHROM AB SERPL-ACNC: 13.3 SEC — HIGH (ref 10–12.9)
PROTHROM AB SERPL-ACNC: 13.6 SEC — HIGH (ref 10–12.9)
PROTHROM AB SERPL-ACNC: 14.2 SEC — HIGH (ref 10–12.9)
PROTHROM AB SERPL-ACNC: 14.3 SEC — HIGH (ref 10–12.9)
PROTHROM AB SERPL-ACNC: 14.3 SEC — HIGH (ref 10–12.9)
PROTHROM AB SERPL-ACNC: 14.4 SEC — HIGH (ref 10–12.9)
PROTHROM AB SERPL-ACNC: 15 SEC — HIGH (ref 10–12.9)
PROTHROM AB SERPL-ACNC: 15.8 SEC — HIGH (ref 10–12.9)
RAPID RVP RESULT: SIGNIFICANT CHANGE UP
RBC # BLD: 1.77 M/UL — LOW (ref 3.8–5.2)
RBC # BLD: 1.99 M/UL — LOW (ref 3.8–5.2)
RBC # BLD: 2.16 M/UL — LOW (ref 3.8–5.2)
RBC # BLD: 2.17 M/UL — LOW (ref 3.8–5.2)
RBC # BLD: 2.25 M/UL — LOW (ref 3.8–5.2)
RBC # BLD: 2.33 M/UL — LOW (ref 3.8–5.2)
RBC # BLD: 2.34 M/UL — LOW (ref 3.8–5.2)
RBC # BLD: 2.36 M/UL — LOW (ref 3.8–5.2)
RBC # BLD: 2.37 M/UL — LOW (ref 3.8–5.2)
RBC # BLD: 2.37 M/UL — LOW (ref 3.8–5.2)
RBC # BLD: 2.38 M/UL — LOW (ref 3.8–5.2)
RBC # BLD: 2.39 M/UL — LOW (ref 3.8–5.2)
RBC # BLD: 2.4 M/UL — LOW (ref 3.8–5.2)
RBC # BLD: 2.41 M/UL — LOW (ref 3.8–5.2)
RBC # BLD: 2.42 M/UL — LOW (ref 3.8–5.2)
RBC # BLD: 2.43 M/UL — LOW (ref 3.8–5.2)
RBC # BLD: 2.45 M/UL — LOW (ref 3.8–5.2)
RBC # BLD: 2.46 M/UL — LOW (ref 3.8–5.2)
RBC # BLD: 2.47 M/UL — LOW (ref 3.8–5.2)
RBC # BLD: 2.48 M/UL — LOW (ref 3.8–5.2)
RBC # BLD: 2.48 M/UL — LOW (ref 3.8–5.2)
RBC # BLD: 2.49 M/UL — LOW (ref 3.8–5.2)
RBC # BLD: 2.5 M/UL — LOW (ref 3.8–5.2)
RBC # BLD: 2.51 M/UL — LOW (ref 3.8–5.2)
RBC # BLD: 2.53 M/UL — LOW (ref 3.8–5.2)
RBC # BLD: 2.53 M/UL — LOW (ref 3.8–5.2)
RBC # BLD: 2.54 M/UL — LOW (ref 3.8–5.2)
RBC # BLD: 2.55 M/UL — LOW (ref 3.8–5.2)
RBC # BLD: 2.56 M/UL — LOW (ref 3.8–5.2)
RBC # BLD: 2.57 M/UL — LOW (ref 3.8–5.2)
RBC # BLD: 2.58 M/UL — LOW (ref 3.8–5.2)
RBC # BLD: 2.59 M/UL — LOW (ref 3.8–5.2)
RBC # BLD: 2.6 M/UL — LOW (ref 3.8–5.2)
RBC # BLD: 2.6 M/UL — LOW (ref 3.8–5.2)
RBC # BLD: 2.62 M/UL — LOW (ref 3.8–5.2)
RBC # BLD: 2.63 M/UL — LOW (ref 3.8–5.2)
RBC # BLD: 2.64 M/UL — LOW (ref 3.8–5.2)
RBC # BLD: 2.65 M/UL — LOW (ref 3.8–5.2)
RBC # BLD: 2.65 M/UL — LOW (ref 3.8–5.2)
RBC # BLD: 2.66 M/UL — LOW (ref 3.8–5.2)
RBC # BLD: 2.67 M/UL — LOW (ref 3.8–5.2)
RBC # BLD: 2.67 M/UL — LOW (ref 3.8–5.2)
RBC # BLD: 2.69 M/UL — LOW (ref 3.8–5.2)
RBC # BLD: 2.69 M/UL — LOW (ref 3.8–5.2)
RBC # BLD: 2.7 M/UL — LOW (ref 3.8–5.2)
RBC # BLD: 2.71 M/UL — LOW (ref 3.8–5.2)
RBC # BLD: 2.72 M/UL — LOW (ref 3.8–5.2)
RBC # BLD: 2.73 M/UL — LOW (ref 3.8–5.2)
RBC # BLD: 2.74 M/UL — LOW (ref 3.8–5.2)
RBC # BLD: 2.74 M/UL — LOW (ref 3.8–5.2)
RBC # BLD: 2.76 M/UL — LOW (ref 3.8–5.2)
RBC # BLD: 2.76 M/UL — LOW (ref 3.8–5.2)
RBC # BLD: 2.79 M/UL — LOW (ref 3.8–5.2)
RBC # BLD: 2.79 M/UL — LOW (ref 3.8–5.2)
RBC # BLD: 2.8 M/UL — LOW (ref 3.8–5.2)
RBC # BLD: 2.8 M/UL — LOW (ref 3.8–5.2)
RBC # BLD: 2.81 M/UL — LOW (ref 3.8–5.2)
RBC # BLD: 2.81 M/UL — LOW (ref 3.8–5.2)
RBC # BLD: 2.82 M/UL — LOW (ref 3.8–5.2)
RBC # BLD: 2.83 M/UL — LOW (ref 3.8–5.2)
RBC # BLD: 2.83 M/UL — LOW (ref 3.8–5.2)
RBC # BLD: 2.84 M/UL — LOW (ref 3.8–5.2)
RBC # BLD: 2.85 M/UL — LOW (ref 3.8–5.2)
RBC # BLD: 2.86 M/UL — LOW (ref 3.8–5.2)
RBC # BLD: 2.86 M/UL — LOW (ref 3.8–5.2)
RBC # BLD: 2.87 M/UL — LOW (ref 3.8–5.2)
RBC # BLD: 2.88 M/UL — LOW (ref 3.8–5.2)
RBC # BLD: 2.88 M/UL — LOW (ref 3.8–5.2)
RBC # BLD: 2.89 M/UL — LOW (ref 3.8–5.2)
RBC # BLD: 2.91 M/UL — LOW (ref 3.8–5.2)
RBC # BLD: 2.91 M/UL — LOW (ref 3.8–5.2)
RBC # BLD: 2.92 M/UL — LOW (ref 3.8–5.2)
RBC # BLD: 2.92 M/UL — LOW (ref 3.8–5.2)
RBC # BLD: 2.93 M/UL — LOW (ref 3.8–5.2)
RBC # BLD: 2.93 M/UL — LOW (ref 3.8–5.2)
RBC # BLD: 2.94 M/UL — LOW (ref 3.8–5.2)
RBC # BLD: 2.95 M/UL — LOW (ref 3.8–5.2)
RBC # BLD: 2.96 M/UL — LOW (ref 3.8–5.2)
RBC # BLD: 2.96 M/UL — LOW (ref 3.8–5.2)
RBC # BLD: 2.97 M/UL — LOW (ref 3.8–5.2)
RBC # BLD: 2.97 M/UL — LOW (ref 3.8–5.2)
RBC # BLD: 2.98 M/UL — LOW (ref 3.8–5.2)
RBC # BLD: 2.99 M/UL — LOW (ref 3.8–5.2)
RBC # BLD: 3.01 M/UL — LOW (ref 3.8–5.2)
RBC # BLD: 3.01 M/UL — LOW (ref 3.8–5.2)
RBC # BLD: 3.02 M/UL — LOW (ref 3.8–5.2)
RBC # BLD: 3.03 M/UL — LOW (ref 3.8–5.2)
RBC # BLD: 3.04 M/UL — LOW (ref 3.8–5.2)
RBC # BLD: 3.05 M/UL — LOW (ref 3.8–5.2)
RBC # BLD: 3.05 M/UL — LOW (ref 3.8–5.2)
RBC # BLD: 3.06 M/UL — LOW (ref 3.8–5.2)
RBC # BLD: 3.07 M/UL — LOW (ref 3.8–5.2)
RBC # BLD: 3.07 M/UL — LOW (ref 3.8–5.2)
RBC # BLD: 3.08 M/UL — LOW (ref 3.8–5.2)
RBC # BLD: 3.09 M/UL — LOW (ref 3.8–5.2)
RBC # BLD: 3.09 M/UL — LOW (ref 3.8–5.2)
RBC # BLD: 3.1 M/UL — LOW (ref 3.8–5.2)
RBC # BLD: 3.12 M/UL — LOW (ref 3.8–5.2)
RBC # BLD: 3.12 M/UL — LOW (ref 3.8–5.2)
RBC # BLD: 3.13 M/UL — LOW (ref 3.8–5.2)
RBC # BLD: 3.13 M/UL — LOW (ref 3.8–5.2)
RBC # BLD: 3.14 M/UL — LOW (ref 3.8–5.2)
RBC # BLD: 3.14 M/UL — LOW (ref 3.8–5.2)
RBC # BLD: 3.15 M/UL — LOW (ref 3.8–5.2)
RBC # BLD: 3.15 M/UL — LOW (ref 3.8–5.2)
RBC # BLD: 3.16 M/UL — LOW (ref 3.8–5.2)
RBC # BLD: 3.17 M/UL — LOW (ref 3.8–5.2)
RBC # BLD: 3.18 M/UL — LOW (ref 3.8–5.2)
RBC # BLD: 3.19 M/UL — LOW (ref 3.8–5.2)
RBC # BLD: 3.2 M/UL — LOW (ref 3.8–5.2)
RBC # BLD: 3.21 M/UL — LOW (ref 3.8–5.2)
RBC # BLD: 3.23 M/UL — LOW (ref 3.8–5.2)
RBC # BLD: 3.23 M/UL — LOW (ref 3.8–5.2)
RBC # BLD: 3.24 M/UL — LOW (ref 3.8–5.2)
RBC # BLD: 3.24 M/UL — LOW (ref 3.8–5.2)
RBC # BLD: 3.25 M/UL — LOW (ref 3.8–5.2)
RBC # BLD: 3.26 M/UL — LOW (ref 3.8–5.2)
RBC # BLD: 3.3 M/UL — LOW (ref 3.8–5.2)
RBC # BLD: 3.31 M/UL — LOW (ref 3.8–5.2)
RBC # BLD: 3.33 M/UL — LOW (ref 3.8–5.2)
RBC # BLD: 3.34 M/UL — LOW (ref 3.8–5.2)
RBC # BLD: 3.36 M/UL — LOW (ref 3.8–5.2)
RBC # BLD: 3.38 M/UL — LOW (ref 3.8–5.2)
RBC # BLD: 3.39 M/UL — LOW (ref 3.8–5.2)
RBC # BLD: 3.4 M/UL — LOW (ref 3.8–5.2)
RBC # BLD: 3.41 M/UL — LOW (ref 3.8–5.2)
RBC # BLD: 3.42 M/UL — LOW (ref 3.8–5.2)
RBC # BLD: 3.42 M/UL — LOW (ref 3.8–5.2)
RBC # BLD: 3.44 M/UL — LOW (ref 3.8–5.2)
RBC # BLD: 3.45 M/UL — LOW (ref 3.8–5.2)
RBC # BLD: 3.46 M/UL — LOW (ref 3.8–5.2)
RBC # BLD: 3.47 M/UL — LOW (ref 3.8–5.2)
RBC # BLD: 3.48 M/UL — LOW (ref 3.8–5.2)
RBC # BLD: 3.49 M/UL — LOW (ref 3.8–5.2)
RBC # BLD: 3.49 M/UL — LOW (ref 3.8–5.2)
RBC # BLD: 3.51 M/UL — LOW (ref 3.8–5.2)
RBC # BLD: 3.55 M/UL — LOW (ref 3.8–5.2)
RBC # BLD: 3.59 M/UL — LOW (ref 3.8–5.2)
RBC # BLD: 3.63 M/UL — LOW (ref 3.8–5.2)
RBC # BLD: 3.64 M/UL — LOW (ref 3.8–5.2)
RBC # BLD: 3.66 M/UL — LOW (ref 3.8–5.2)
RBC # BLD: 3.67 M/UL — LOW (ref 3.8–5.2)
RBC # BLD: 3.69 M/UL — LOW (ref 3.8–5.2)
RBC # BLD: 3.72 M/UL — LOW (ref 3.8–5.2)
RBC # BLD: 3.75 M/UL — LOW (ref 3.8–5.2)
RBC # BLD: 3.79 M/UL — LOW (ref 3.8–5.2)
RBC # BLD: 3.82 M/UL — SIGNIFICANT CHANGE UP (ref 3.8–5.2)
RBC # BLD: 3.82 M/UL — SIGNIFICANT CHANGE UP (ref 3.8–5.2)
RBC # BLD: 3.87 M/UL — SIGNIFICANT CHANGE UP (ref 3.8–5.2)
RBC # BLD: 3.9 M/UL — SIGNIFICANT CHANGE UP (ref 3.8–5.2)
RBC # BLD: 4.04 M/UL — SIGNIFICANT CHANGE UP (ref 3.8–5.2)
RBC # BLD: 4.28 M/UL — SIGNIFICANT CHANGE UP (ref 3.8–5.2)
RBC # BLD: 4.54 M/UL — SIGNIFICANT CHANGE UP (ref 3.8–5.2)
RBC # FLD: 13 % — SIGNIFICANT CHANGE UP (ref 10.3–14.5)
RBC # FLD: 13.6 % — SIGNIFICANT CHANGE UP (ref 10.3–14.5)
RBC # FLD: 13.7 % — SIGNIFICANT CHANGE UP (ref 10.3–14.5)
RBC # FLD: 13.9 % — SIGNIFICANT CHANGE UP (ref 10.3–14.5)
RBC # FLD: 14 % — SIGNIFICANT CHANGE UP (ref 10.3–14.5)
RBC # FLD: 14.1 % — SIGNIFICANT CHANGE UP (ref 10.3–14.5)
RBC # FLD: 14.2 % — SIGNIFICANT CHANGE UP (ref 10.3–14.5)
RBC # FLD: 14.2 % — SIGNIFICANT CHANGE UP (ref 10.3–14.5)
RBC # FLD: 14.3 % — SIGNIFICANT CHANGE UP (ref 10.3–14.5)
RBC # FLD: 14.7 % — HIGH (ref 10.3–14.5)
RBC # FLD: 14.7 % — HIGH (ref 10.3–14.5)
RBC # FLD: 14.8 % — HIGH (ref 10.3–14.5)
RBC # FLD: 14.9 % — HIGH (ref 10.3–14.5)
RBC # FLD: 14.9 % — HIGH (ref 10.3–14.5)
RBC # FLD: 15 % — HIGH (ref 10.3–14.5)
RBC # FLD: 15.1 % — HIGH (ref 10.3–14.5)
RBC # FLD: 15.2 % — HIGH (ref 10.3–14.5)
RBC # FLD: 15.3 % — HIGH (ref 10.3–14.5)
RBC # FLD: 15.4 % — HIGH (ref 10.3–14.5)
RBC # FLD: 15.5 % — HIGH (ref 10.3–14.5)
RBC # FLD: 15.6 % — HIGH (ref 10.3–14.5)
RBC # FLD: 15.7 % — HIGH (ref 10.3–14.5)
RBC # FLD: 15.8 % — HIGH (ref 10.3–14.5)
RBC # FLD: 15.8 % — HIGH (ref 10.3–14.5)
RBC # FLD: 15.9 % — HIGH (ref 10.3–14.5)
RBC # FLD: 16 % — HIGH (ref 10.3–14.5)
RBC # FLD: 16.1 % — HIGH (ref 10.3–14.5)
RBC # FLD: 16.2 % — HIGH (ref 10.3–14.5)
RBC # FLD: 16.3 % — HIGH (ref 10.3–14.5)
RBC # FLD: 16.4 % — HIGH (ref 10.3–14.5)
RBC # FLD: 16.5 % — HIGH (ref 10.3–14.5)
RBC # FLD: 16.6 % — HIGH (ref 10.3–14.5)
RBC # FLD: 16.7 % — HIGH (ref 10.3–14.5)
RBC # FLD: 16.8 % — HIGH (ref 10.3–14.5)
RBC # FLD: 16.9 % — HIGH (ref 10.3–14.5)
RBC # FLD: 17 % — HIGH (ref 10.3–14.5)
RBC # FLD: 17 % — HIGH (ref 10.3–14.5)
RBC # FLD: 17.1 % — HIGH (ref 10.3–14.5)
RBC # FLD: 17.1 % — HIGH (ref 10.3–14.5)
RBC # FLD: 17.2 % — HIGH (ref 10.3–14.5)
RBC # FLD: 17.2 % — HIGH (ref 10.3–14.5)
RBC # FLD: 17.3 % — HIGH (ref 10.3–14.5)
RBC # FLD: 17.3 % — HIGH (ref 10.3–14.5)
RBC # FLD: 17.4 % — HIGH (ref 10.3–14.5)
RBC # FLD: 17.5 % — HIGH (ref 10.3–14.5)
RBC # FLD: 17.5 % — HIGH (ref 10.3–14.5)
RBC # FLD: 17.6 % — HIGH (ref 10.3–14.5)
RBC # FLD: 17.7 % — HIGH (ref 10.3–14.5)
RBC # FLD: 17.8 % — HIGH (ref 10.3–14.5)
RBC # FLD: 18 % — HIGH (ref 10.3–14.5)
RBC # FLD: 18 % — HIGH (ref 10.3–14.5)
RBC # FLD: 18.1 % — HIGH (ref 10.3–14.5)
RBC # FLD: 18.1 % — HIGH (ref 10.3–14.5)
RBC # FLD: 18.2 % — HIGH (ref 10.3–14.5)
RBC # FLD: 18.6 % — HIGH (ref 10.3–14.5)
RBC # FLD: 18.7 % — HIGH (ref 10.3–14.5)
RBC # FLD: 18.7 % — HIGH (ref 10.3–14.5)
RBC # FLD: 18.8 % — HIGH (ref 10.3–14.5)
RBC # FLD: 19 % — HIGH (ref 10.3–14.5)
RBC # FLD: 19 % — HIGH (ref 10.3–14.5)
RBC # FLD: 19.1 % — HIGH (ref 10.3–14.5)
RBC # FLD: 19.2 % — HIGH (ref 10.3–14.5)
RBC # FLD: 19.2 % — HIGH (ref 10.3–14.5)
RBC # FLD: 20.5 % — HIGH (ref 10.3–14.5)
RBC # FLD: 20.9 % — HIGH (ref 10.3–14.5)
RBC BLD AUTO: ABNORMAL
RBC BLD AUTO: ABNORMAL
RBC CASTS # UR COMP ASSIST: 14 /HPF — HIGH (ref 0–4)
RBC CASTS # UR COMP ASSIST: 3 /HPF — SIGNIFICANT CHANGE UP (ref 0–4)
RBC CASTS # UR COMP ASSIST: 4 /HPF — SIGNIFICANT CHANGE UP (ref 0–4)
RETICS #: 93.6 K/UL — SIGNIFICANT CHANGE UP (ref 25–125)
RETICS/RBC NFR: 3 % — HIGH (ref 0.5–2.5)
RH IG SCN BLD-IMP: POSITIVE — SIGNIFICANT CHANGE UP
SALICYLATES SERPL-MCNC: <1.7 MG/DL — LOW (ref 2.8–20)
SAO2 % BLDA: 100 % — SIGNIFICANT CHANGE UP (ref 95–100)
SAO2 % BLDA: 99 % — HIGH (ref 92–96)
SAO2 % BLDA: 99 % — HIGH (ref 92–96)
SAO2 % BLDA: 99 % — SIGNIFICANT CHANGE UP (ref 95–100)
SODIUM BLDA-SCNC: 134 MMOL/L — LOW (ref 138–146)
SODIUM BLDA-SCNC: 140 MMOL/L — SIGNIFICANT CHANGE UP (ref 138–146)
SODIUM SERPL-SCNC: 132 MMOL/L — LOW (ref 135–145)
SODIUM SERPL-SCNC: 136 MMOL/L — SIGNIFICANT CHANGE UP (ref 135–145)
SODIUM SERPL-SCNC: 137 MMOL/L — SIGNIFICANT CHANGE UP (ref 135–145)
SODIUM SERPL-SCNC: 138 MMOL/L — SIGNIFICANT CHANGE UP (ref 135–145)
SODIUM SERPL-SCNC: 139 MMOL/L — SIGNIFICANT CHANGE UP (ref 135–145)
SODIUM SERPL-SCNC: 140 MMOL/L — SIGNIFICANT CHANGE UP (ref 135–145)
SODIUM SERPL-SCNC: 141 MMOL/L — SIGNIFICANT CHANGE UP (ref 135–145)
SODIUM SERPL-SCNC: 142 MMOL/L — SIGNIFICANT CHANGE UP (ref 135–145)
SODIUM SERPL-SCNC: 143 MMOL/L — SIGNIFICANT CHANGE UP (ref 135–145)
SODIUM SERPL-SCNC: 144 MMOL/L — SIGNIFICANT CHANGE UP (ref 135–145)
SODIUM SERPL-SCNC: 145 MMOL/L — SIGNIFICANT CHANGE UP (ref 135–145)
SODIUM SERPL-SCNC: 146 MMOL/L — HIGH (ref 135–145)
SODIUM SERPL-SCNC: 147 MMOL/L — HIGH (ref 135–145)
SODIUM SERPL-SCNC: 148 MMOL/L — HIGH (ref 135–145)
SP GR SPEC: 1.01 — SIGNIFICANT CHANGE UP (ref 1.01–1.02)
SP GR SPEC: 1.01 — SIGNIFICANT CHANGE UP (ref 1–1.03)
SP GR SPEC: 1.02 — SIGNIFICANT CHANGE UP (ref 1.01–1.02)
SPECIMEN SOURCE: SIGNIFICANT CHANGE UP
SURGICAL PATHOLOGY STUDY: SIGNIFICANT CHANGE UP
SURGICAL PATHOLOGY STUDY: SIGNIFICANT CHANGE UP
T4 AB SER-ACNC: 6.54 UG/DL — SIGNIFICANT CHANGE UP (ref 3.17–11.72)
TARGETS BLD QL SMEAR: SLIGHT — SIGNIFICANT CHANGE UP
TIBC SERPL-MCNC: SIGNIFICANT CHANGE UP UG/DL (ref 220–430)
TOTAL CHOLESTEROL/HDL RATIO MEASUREMENT: 2.2 RATIO — LOW (ref 3.3–7.1)
TOTAL CHOLESTEROL/HDL RATIO MEASUREMENT: 2.2 RATIO — LOW (ref 3.3–7.1)
TRIGL SERPL-MCNC: 102 MG/DL — SIGNIFICANT CHANGE UP (ref 10–149)
TRIGL SERPL-MCNC: 174 MG/DL — HIGH (ref 10–149)
TRIGL SERPL-MCNC: 82 MG/DL — SIGNIFICANT CHANGE UP (ref 10–149)
TROPONIN I SERPL-MCNC: 0.21 NG/ML — HIGH (ref 0.01–0.04)
TROPONIN I SERPL-MCNC: <0.015 NG/ML — SIGNIFICANT CHANGE UP (ref 0.01–0.04)
TROPONIN I SERPL-MCNC: <0.015 NG/ML — SIGNIFICANT CHANGE UP (ref 0.01–0.04)
TROPONIN T SERPL-MCNC: 0.02 NG/ML — HIGH (ref 0–0.01)
TROPONIN T SERPL-MCNC: <0.01 NG/ML — SIGNIFICANT CHANGE UP (ref 0–0.01)
TROPONIN T SERPL-MCNC: <0.01 NG/ML — SIGNIFICANT CHANGE UP (ref 0–0.01)
TROPONIN T, HIGH SENSITIVITY RESULT: 59 NG/L — HIGH (ref 0–51)
TROPONIN T, HIGH SENSITIVITY RESULT: 59 NG/L — HIGH (ref 0–51)
TROPONIN T, HIGH SENSITIVITY RESULT: 61 NG/L — HIGH (ref 0–51)
TSH SERPL-MCNC: 0.81 UIU/ML — SIGNIFICANT CHANGE UP (ref 0.35–4.94)
UIBC SERPL-MCNC: <20 UG/DL — LOW (ref 110–370)
UROBILINOGEN FLD QL: 0.2 E.U./DL — SIGNIFICANT CHANGE UP
UROBILINOGEN FLD QL: NEGATIVE MG/DL — SIGNIFICANT CHANGE UP
UROBILINOGEN FLD QL: NEGATIVE — SIGNIFICANT CHANGE UP
UROBILINOGEN FLD QL: SIGNIFICANT CHANGE UP
WBC # BLD: 10.04 K/UL — SIGNIFICANT CHANGE UP (ref 3.8–10.5)
WBC # BLD: 10.05 K/UL — SIGNIFICANT CHANGE UP (ref 3.8–10.5)
WBC # BLD: 10.2 K/UL — SIGNIFICANT CHANGE UP (ref 3.8–10.5)
WBC # BLD: 10.2 K/UL — SIGNIFICANT CHANGE UP (ref 3.8–10.5)
WBC # BLD: 10.22 K/UL — SIGNIFICANT CHANGE UP (ref 3.8–10.5)
WBC # BLD: 10.3 K/UL — SIGNIFICANT CHANGE UP (ref 3.8–10.5)
WBC # BLD: 10.3 K/UL — SIGNIFICANT CHANGE UP (ref 3.8–10.5)
WBC # BLD: 10.33 K/UL — SIGNIFICANT CHANGE UP (ref 3.8–10.5)
WBC # BLD: 10.36 K/UL — SIGNIFICANT CHANGE UP (ref 3.8–10.5)
WBC # BLD: 10.4 K/UL — SIGNIFICANT CHANGE UP (ref 3.8–10.5)
WBC # BLD: 10.5 K/UL — SIGNIFICANT CHANGE UP (ref 3.8–10.5)
WBC # BLD: 10.52 K/UL — HIGH (ref 3.8–10.5)
WBC # BLD: 10.6 K/UL — HIGH (ref 3.8–10.5)
WBC # BLD: 10.7 K/UL — HIGH (ref 3.8–10.5)
WBC # BLD: 10.7 K/UL — HIGH (ref 3.8–10.5)
WBC # BLD: 10.73 K/UL — HIGH (ref 3.8–10.5)
WBC # BLD: 10.76 K/UL — HIGH (ref 3.8–10.5)
WBC # BLD: 10.82 K/UL — HIGH (ref 3.8–10.5)
WBC # BLD: 10.82 K/UL — HIGH (ref 3.8–10.5)
WBC # BLD: 10.9 K/UL — HIGH (ref 3.8–10.5)
WBC # BLD: 11.06 K/UL — HIGH (ref 3.8–10.5)
WBC # BLD: 11.09 K/UL — HIGH (ref 3.8–10.5)
WBC # BLD: 11.1 K/UL — HIGH (ref 3.8–10.5)
WBC # BLD: 11.12 K/UL — HIGH (ref 3.8–10.5)
WBC # BLD: 11.3 K/UL — HIGH (ref 3.8–10.5)
WBC # BLD: 11.31 K/UL — HIGH (ref 3.8–10.5)
WBC # BLD: 11.49 K/UL — HIGH (ref 3.8–10.5)
WBC # BLD: 11.5 K/UL — HIGH (ref 3.8–10.5)
WBC # BLD: 11.62 K/UL — HIGH (ref 3.8–10.5)
WBC # BLD: 11.68 K/UL — HIGH (ref 3.8–10.5)
WBC # BLD: 11.7 K/UL — HIGH (ref 3.8–10.5)
WBC # BLD: 11.88 K/UL — HIGH (ref 3.8–10.5)
WBC # BLD: 11.93 K/UL — HIGH (ref 3.8–10.5)
WBC # BLD: 12.1 K/UL — HIGH (ref 3.8–10.5)
WBC # BLD: 12.35 K/UL — HIGH (ref 3.8–10.5)
WBC # BLD: 12.4 K/UL — HIGH (ref 3.8–10.5)
WBC # BLD: 12.6 K/UL — HIGH (ref 3.8–10.5)
WBC # BLD: 12.77 K/UL — HIGH (ref 3.8–10.5)
WBC # BLD: 12.77 K/UL — HIGH (ref 3.8–10.5)
WBC # BLD: 12.81 K/UL — HIGH (ref 3.8–10.5)
WBC # BLD: 13.05 K/UL — HIGH (ref 3.8–10.5)
WBC # BLD: 13.1 K/UL — HIGH (ref 3.8–10.5)
WBC # BLD: 13.11 K/UL — HIGH (ref 3.8–10.5)
WBC # BLD: 13.16 K/UL — HIGH (ref 3.8–10.5)
WBC # BLD: 13.29 K/UL — HIGH (ref 3.8–10.5)
WBC # BLD: 13.3 K/UL — HIGH (ref 3.8–10.5)
WBC # BLD: 13.38 K/UL — HIGH (ref 3.8–10.5)
WBC # BLD: 13.4 K/UL — HIGH (ref 3.8–10.5)
WBC # BLD: 13.56 K/UL — HIGH (ref 3.8–10.5)
WBC # BLD: 13.8 K/UL — HIGH (ref 3.8–10.5)
WBC # BLD: 13.92 K/UL — HIGH (ref 3.8–10.5)
WBC # BLD: 14 K/UL — HIGH (ref 3.8–10.5)
WBC # BLD: 14.34 K/UL — HIGH (ref 3.8–10.5)
WBC # BLD: 14.39 K/UL — HIGH (ref 3.8–10.5)
WBC # BLD: 14.46 K/UL — HIGH (ref 3.8–10.5)
WBC # BLD: 14.46 K/UL — HIGH (ref 3.8–10.5)
WBC # BLD: 14.53 K/UL — HIGH (ref 3.8–10.5)
WBC # BLD: 14.59 K/UL — HIGH (ref 3.8–10.5)
WBC # BLD: 14.69 K/UL — HIGH (ref 3.8–10.5)
WBC # BLD: 14.74 K/UL — HIGH (ref 3.8–10.5)
WBC # BLD: 14.78 K/UL — HIGH (ref 3.8–10.5)
WBC # BLD: 14.88 K/UL — HIGH (ref 3.8–10.5)
WBC # BLD: 14.9 K/UL — HIGH (ref 3.8–10.5)
WBC # BLD: 15.05 K/UL — HIGH (ref 3.8–10.5)
WBC # BLD: 15.05 K/UL — HIGH (ref 3.8–10.5)
WBC # BLD: 15.08 K/UL — HIGH (ref 3.8–10.5)
WBC # BLD: 15.13 K/UL — HIGH (ref 3.8–10.5)
WBC # BLD: 15.26 K/UL — HIGH (ref 3.8–10.5)
WBC # BLD: 15.76 K/UL — HIGH (ref 3.8–10.5)
WBC # BLD: 15.96 K/UL — HIGH (ref 3.8–10.5)
WBC # BLD: 16.02 K/UL — HIGH (ref 3.8–10.5)
WBC # BLD: 16.21 K/UL — HIGH (ref 3.8–10.5)
WBC # BLD: 16.31 K/UL — HIGH (ref 3.8–10.5)
WBC # BLD: 16.53 K/UL — HIGH (ref 3.8–10.5)
WBC # BLD: 16.63 K/UL — HIGH (ref 3.8–10.5)
WBC # BLD: 16.73 K/UL — HIGH (ref 3.8–10.5)
WBC # BLD: 16.92 K/UL — HIGH (ref 3.8–10.5)
WBC # BLD: 17.1 K/UL — HIGH (ref 3.8–10.5)
WBC # BLD: 17.18 K/UL — HIGH (ref 3.8–10.5)
WBC # BLD: 17.3 K/UL — HIGH (ref 3.8–10.5)
WBC # BLD: 17.37 K/UL — HIGH (ref 3.8–10.5)
WBC # BLD: 17.47 K/UL — HIGH (ref 3.8–10.5)
WBC # BLD: 17.7 K/UL — HIGH (ref 3.8–10.5)
WBC # BLD: 19.54 K/UL — HIGH (ref 3.8–10.5)
WBC # BLD: 20.03 K/UL — HIGH (ref 3.8–10.5)
WBC # BLD: 20.22 K/UL — HIGH (ref 3.8–10.5)
WBC # BLD: 20.85 K/UL — HIGH (ref 3.8–10.5)
WBC # BLD: 21.43 K/UL — HIGH (ref 3.8–10.5)
WBC # BLD: 24.68 K/UL — HIGH (ref 3.8–10.5)
WBC # BLD: 28.81 K/UL — HIGH (ref 3.8–10.5)
WBC # BLD: 3.81 K/UL — SIGNIFICANT CHANGE UP (ref 3.8–10.5)
WBC # BLD: 30.01 K/UL — HIGH (ref 3.8–10.5)
WBC # BLD: 34.44 K/UL — HIGH (ref 3.8–10.5)
WBC # BLD: 4.09 K/UL — SIGNIFICANT CHANGE UP (ref 3.8–10.5)
WBC # BLD: 4.27 K/UL — SIGNIFICANT CHANGE UP (ref 3.8–10.5)
WBC # BLD: 4.44 K/UL — SIGNIFICANT CHANGE UP (ref 3.8–10.5)
WBC # BLD: 4.93 K/UL — SIGNIFICANT CHANGE UP (ref 3.8–10.5)
WBC # BLD: 5.18 K/UL — SIGNIFICANT CHANGE UP (ref 3.8–10.5)
WBC # BLD: 5.2 K/UL — SIGNIFICANT CHANGE UP (ref 3.8–10.5)
WBC # BLD: 5.34 K/UL — SIGNIFICANT CHANGE UP (ref 3.8–10.5)
WBC # BLD: 5.6 K/UL — SIGNIFICANT CHANGE UP (ref 3.8–10.5)
WBC # BLD: 5.66 K/UL — SIGNIFICANT CHANGE UP (ref 3.8–10.5)
WBC # BLD: 5.8 K/UL — SIGNIFICANT CHANGE UP (ref 3.8–10.5)
WBC # BLD: 5.88 K/UL — SIGNIFICANT CHANGE UP (ref 3.8–10.5)
WBC # BLD: 5.96 K/UL — SIGNIFICANT CHANGE UP (ref 3.8–10.5)
WBC # BLD: 5.97 K/UL — SIGNIFICANT CHANGE UP (ref 3.8–10.5)
WBC # BLD: 5.98 K/UL — SIGNIFICANT CHANGE UP (ref 3.8–10.5)
WBC # BLD: 6.1 K/UL — SIGNIFICANT CHANGE UP (ref 3.8–10.5)
WBC # BLD: 6.11 K/UL — SIGNIFICANT CHANGE UP (ref 3.8–10.5)
WBC # BLD: 6.15 K/UL — SIGNIFICANT CHANGE UP (ref 3.8–10.5)
WBC # BLD: 6.33 K/UL — SIGNIFICANT CHANGE UP (ref 3.8–10.5)
WBC # BLD: 6.4 K/UL — SIGNIFICANT CHANGE UP (ref 3.8–10.5)
WBC # BLD: 6.41 K/UL — SIGNIFICANT CHANGE UP (ref 3.8–10.5)
WBC # BLD: 6.43 K/UL — SIGNIFICANT CHANGE UP (ref 3.8–10.5)
WBC # BLD: 6.44 K/UL — SIGNIFICANT CHANGE UP (ref 3.8–10.5)
WBC # BLD: 6.47 K/UL — SIGNIFICANT CHANGE UP (ref 3.8–10.5)
WBC # BLD: 6.57 K/UL — SIGNIFICANT CHANGE UP (ref 3.8–10.5)
WBC # BLD: 6.6 K/UL — SIGNIFICANT CHANGE UP (ref 3.8–10.5)
WBC # BLD: 6.65 K/UL — SIGNIFICANT CHANGE UP (ref 3.8–10.5)
WBC # BLD: 6.73 K/UL — SIGNIFICANT CHANGE UP (ref 3.8–10.5)
WBC # BLD: 6.79 K/UL — SIGNIFICANT CHANGE UP (ref 3.8–10.5)
WBC # BLD: 6.8 K/UL — SIGNIFICANT CHANGE UP (ref 3.8–10.5)
WBC # BLD: 6.8 K/UL — SIGNIFICANT CHANGE UP (ref 3.8–10.5)
WBC # BLD: 6.81 K/UL — SIGNIFICANT CHANGE UP (ref 3.8–10.5)
WBC # BLD: 6.84 K/UL — SIGNIFICANT CHANGE UP (ref 3.8–10.5)
WBC # BLD: 6.87 K/UL — SIGNIFICANT CHANGE UP (ref 3.8–10.5)
WBC # BLD: 6.91 K/UL — SIGNIFICANT CHANGE UP (ref 3.8–10.5)
WBC # BLD: 6.93 K/UL — SIGNIFICANT CHANGE UP (ref 3.8–10.5)
WBC # BLD: 6.94 K/UL — SIGNIFICANT CHANGE UP (ref 3.8–10.5)
WBC # BLD: 7.02 K/UL — SIGNIFICANT CHANGE UP (ref 3.8–10.5)
WBC # BLD: 7.09 K/UL — SIGNIFICANT CHANGE UP (ref 3.8–10.5)
WBC # BLD: 7.09 K/UL — SIGNIFICANT CHANGE UP (ref 3.8–10.5)
WBC # BLD: 7.19 K/UL — SIGNIFICANT CHANGE UP (ref 3.8–10.5)
WBC # BLD: 7.22 K/UL — SIGNIFICANT CHANGE UP (ref 3.8–10.5)
WBC # BLD: 7.25 K/UL — SIGNIFICANT CHANGE UP (ref 3.8–10.5)
WBC # BLD: 7.25 K/UL — SIGNIFICANT CHANGE UP (ref 3.8–10.5)
WBC # BLD: 7.3 K/UL — SIGNIFICANT CHANGE UP (ref 3.8–10.5)
WBC # BLD: 7.33 K/UL — SIGNIFICANT CHANGE UP (ref 3.8–10.5)
WBC # BLD: 7.33 K/UL — SIGNIFICANT CHANGE UP (ref 3.8–10.5)
WBC # BLD: 7.39 K/UL — SIGNIFICANT CHANGE UP (ref 3.8–10.5)
WBC # BLD: 7.39 K/UL — SIGNIFICANT CHANGE UP (ref 3.8–10.5)
WBC # BLD: 7.4 K/UL — SIGNIFICANT CHANGE UP (ref 3.8–10.5)
WBC # BLD: 7.43 K/UL — SIGNIFICANT CHANGE UP (ref 3.8–10.5)
WBC # BLD: 7.45 K/UL — SIGNIFICANT CHANGE UP (ref 3.8–10.5)
WBC # BLD: 7.52 K/UL — SIGNIFICANT CHANGE UP (ref 3.8–10.5)
WBC # BLD: 7.55 K/UL — SIGNIFICANT CHANGE UP (ref 3.8–10.5)
WBC # BLD: 7.6 K/UL — SIGNIFICANT CHANGE UP (ref 3.8–10.5)
WBC # BLD: 7.64 K/UL — SIGNIFICANT CHANGE UP (ref 3.8–10.5)
WBC # BLD: 7.7 K/UL — SIGNIFICANT CHANGE UP (ref 3.8–10.5)
WBC # BLD: 7.7 K/UL — SIGNIFICANT CHANGE UP (ref 3.8–10.5)
WBC # BLD: 7.75 K/UL — SIGNIFICANT CHANGE UP (ref 3.8–10.5)
WBC # BLD: 7.9 K/UL — SIGNIFICANT CHANGE UP (ref 3.8–10.5)
WBC # BLD: 7.93 K/UL — SIGNIFICANT CHANGE UP (ref 3.8–10.5)
WBC # BLD: 7.94 K/UL — SIGNIFICANT CHANGE UP (ref 3.8–10.5)
WBC # BLD: 7.96 K/UL — SIGNIFICANT CHANGE UP (ref 3.8–10.5)
WBC # BLD: 8 K/UL — SIGNIFICANT CHANGE UP (ref 3.8–10.5)
WBC # BLD: 8 K/UL — SIGNIFICANT CHANGE UP (ref 3.8–10.5)
WBC # BLD: 8.03 K/UL — SIGNIFICANT CHANGE UP (ref 3.8–10.5)
WBC # BLD: 8.04 K/UL — SIGNIFICANT CHANGE UP (ref 3.8–10.5)
WBC # BLD: 8.05 K/UL — SIGNIFICANT CHANGE UP (ref 3.8–10.5)
WBC # BLD: 8.09 K/UL — SIGNIFICANT CHANGE UP (ref 3.8–10.5)
WBC # BLD: 8.1 K/UL — SIGNIFICANT CHANGE UP (ref 3.8–10.5)
WBC # BLD: 8.13 K/UL — SIGNIFICANT CHANGE UP (ref 3.8–10.5)
WBC # BLD: 8.2 K/UL — SIGNIFICANT CHANGE UP (ref 3.8–10.5)
WBC # BLD: 8.23 K/UL — SIGNIFICANT CHANGE UP (ref 3.8–10.5)
WBC # BLD: 8.28 K/UL — SIGNIFICANT CHANGE UP (ref 3.8–10.5)
WBC # BLD: 8.29 K/UL — SIGNIFICANT CHANGE UP (ref 3.8–10.5)
WBC # BLD: 8.29 K/UL — SIGNIFICANT CHANGE UP (ref 3.8–10.5)
WBC # BLD: 8.39 K/UL — SIGNIFICANT CHANGE UP (ref 3.8–10.5)
WBC # BLD: 8.42 K/UL — SIGNIFICANT CHANGE UP (ref 3.8–10.5)
WBC # BLD: 8.62 K/UL — SIGNIFICANT CHANGE UP (ref 3.8–10.5)
WBC # BLD: 8.75 K/UL — SIGNIFICANT CHANGE UP (ref 3.8–10.5)
WBC # BLD: 8.76 K/UL — SIGNIFICANT CHANGE UP (ref 3.8–10.5)
WBC # BLD: 8.85 K/UL — SIGNIFICANT CHANGE UP (ref 3.8–10.5)
WBC # BLD: 8.9 K/UL — SIGNIFICANT CHANGE UP (ref 3.8–10.5)
WBC # BLD: 8.92 K/UL — SIGNIFICANT CHANGE UP (ref 3.8–10.5)
WBC # BLD: 8.96 K/UL — SIGNIFICANT CHANGE UP (ref 3.8–10.5)
WBC # BLD: 9 K/UL — SIGNIFICANT CHANGE UP (ref 3.8–10.5)
WBC # BLD: 9 K/UL — SIGNIFICANT CHANGE UP (ref 3.8–10.5)
WBC # BLD: 9.08 K/UL — SIGNIFICANT CHANGE UP (ref 3.8–10.5)
WBC # BLD: 9.14 K/UL — SIGNIFICANT CHANGE UP (ref 3.8–10.5)
WBC # BLD: 9.17 K/UL — SIGNIFICANT CHANGE UP (ref 3.8–10.5)
WBC # BLD: 9.19 K/UL — SIGNIFICANT CHANGE UP (ref 3.8–10.5)
WBC # BLD: 9.2 K/UL — SIGNIFICANT CHANGE UP (ref 3.8–10.5)
WBC # BLD: 9.3 K/UL — SIGNIFICANT CHANGE UP (ref 3.8–10.5)
WBC # BLD: 9.34 K/UL — SIGNIFICANT CHANGE UP (ref 3.8–10.5)
WBC # BLD: 9.35 K/UL — SIGNIFICANT CHANGE UP (ref 3.8–10.5)
WBC # BLD: 9.37 K/UL — SIGNIFICANT CHANGE UP (ref 3.8–10.5)
WBC # BLD: 9.39 K/UL — SIGNIFICANT CHANGE UP (ref 3.8–10.5)
WBC # BLD: 9.4 K/UL — SIGNIFICANT CHANGE UP (ref 3.8–10.5)
WBC # BLD: 9.44 K/UL — SIGNIFICANT CHANGE UP (ref 3.8–10.5)
WBC # BLD: 9.54 K/UL — SIGNIFICANT CHANGE UP (ref 3.8–10.5)
WBC # BLD: 9.6 K/UL — SIGNIFICANT CHANGE UP (ref 3.8–10.5)
WBC # BLD: 9.67 K/UL — SIGNIFICANT CHANGE UP (ref 3.8–10.5)
WBC # BLD: 9.68 K/UL — SIGNIFICANT CHANGE UP (ref 3.8–10.5)
WBC # BLD: 9.69 K/UL — SIGNIFICANT CHANGE UP (ref 3.8–10.5)
WBC # BLD: 9.7 K/UL — SIGNIFICANT CHANGE UP (ref 3.8–10.5)
WBC # BLD: 9.74 K/UL — SIGNIFICANT CHANGE UP (ref 3.8–10.5)
WBC # BLD: 9.75 K/UL — SIGNIFICANT CHANGE UP (ref 3.8–10.5)
WBC # BLD: 9.82 K/UL — SIGNIFICANT CHANGE UP (ref 3.8–10.5)
WBC # BLD: 9.83 K/UL — SIGNIFICANT CHANGE UP (ref 3.8–10.5)
WBC # BLD: 9.84 K/UL — SIGNIFICANT CHANGE UP (ref 3.8–10.5)
WBC # BLD: 9.86 K/UL — SIGNIFICANT CHANGE UP (ref 3.8–10.5)
WBC # BLD: 9.91 K/UL — SIGNIFICANT CHANGE UP (ref 3.8–10.5)
WBC # FLD AUTO: 10.04 K/UL — SIGNIFICANT CHANGE UP (ref 3.8–10.5)
WBC # FLD AUTO: 10.05 K/UL — SIGNIFICANT CHANGE UP (ref 3.8–10.5)
WBC # FLD AUTO: 10.2 K/UL — SIGNIFICANT CHANGE UP (ref 3.8–10.5)
WBC # FLD AUTO: 10.2 K/UL — SIGNIFICANT CHANGE UP (ref 3.8–10.5)
WBC # FLD AUTO: 10.22 K/UL — SIGNIFICANT CHANGE UP (ref 3.8–10.5)
WBC # FLD AUTO: 10.3 K/UL — SIGNIFICANT CHANGE UP (ref 3.8–10.5)
WBC # FLD AUTO: 10.3 K/UL — SIGNIFICANT CHANGE UP (ref 3.8–10.5)
WBC # FLD AUTO: 10.33 K/UL — SIGNIFICANT CHANGE UP (ref 3.8–10.5)
WBC # FLD AUTO: 10.36 K/UL — SIGNIFICANT CHANGE UP (ref 3.8–10.5)
WBC # FLD AUTO: 10.4 K/UL — SIGNIFICANT CHANGE UP (ref 3.8–10.5)
WBC # FLD AUTO: 10.5 K/UL — SIGNIFICANT CHANGE UP (ref 3.8–10.5)
WBC # FLD AUTO: 10.52 K/UL — HIGH (ref 3.8–10.5)
WBC # FLD AUTO: 10.6 K/UL — HIGH (ref 3.8–10.5)
WBC # FLD AUTO: 10.7 K/UL — HIGH (ref 3.8–10.5)
WBC # FLD AUTO: 10.7 K/UL — HIGH (ref 3.8–10.5)
WBC # FLD AUTO: 10.73 K/UL — HIGH (ref 3.8–10.5)
WBC # FLD AUTO: 10.76 K/UL — HIGH (ref 3.8–10.5)
WBC # FLD AUTO: 10.82 K/UL — HIGH (ref 3.8–10.5)
WBC # FLD AUTO: 10.82 K/UL — HIGH (ref 3.8–10.5)
WBC # FLD AUTO: 10.9 K/UL — HIGH (ref 3.8–10.5)
WBC # FLD AUTO: 11.06 K/UL — HIGH (ref 3.8–10.5)
WBC # FLD AUTO: 11.09 K/UL — HIGH (ref 3.8–10.5)
WBC # FLD AUTO: 11.1 K/UL — HIGH (ref 3.8–10.5)
WBC # FLD AUTO: 11.12 K/UL — HIGH (ref 3.8–10.5)
WBC # FLD AUTO: 11.3 K/UL — HIGH (ref 3.8–10.5)
WBC # FLD AUTO: 11.31 K/UL — HIGH (ref 3.8–10.5)
WBC # FLD AUTO: 11.49 K/UL — HIGH (ref 3.8–10.5)
WBC # FLD AUTO: 11.5 K/UL — HIGH (ref 3.8–10.5)
WBC # FLD AUTO: 11.62 K/UL — HIGH (ref 3.8–10.5)
WBC # FLD AUTO: 11.68 K/UL — HIGH (ref 3.8–10.5)
WBC # FLD AUTO: 11.7 K/UL — HIGH (ref 3.8–10.5)
WBC # FLD AUTO: 11.88 K/UL — HIGH (ref 3.8–10.5)
WBC # FLD AUTO: 11.93 K/UL — HIGH (ref 3.8–10.5)
WBC # FLD AUTO: 12.1 K/UL — HIGH (ref 3.8–10.5)
WBC # FLD AUTO: 12.35 K/UL — HIGH (ref 3.8–10.5)
WBC # FLD AUTO: 12.4 K/UL — HIGH (ref 3.8–10.5)
WBC # FLD AUTO: 12.6 K/UL — HIGH (ref 3.8–10.5)
WBC # FLD AUTO: 12.77 K/UL — HIGH (ref 3.8–10.5)
WBC # FLD AUTO: 12.77 K/UL — HIGH (ref 3.8–10.5)
WBC # FLD AUTO: 12.81 K/UL — HIGH (ref 3.8–10.5)
WBC # FLD AUTO: 13.05 K/UL — HIGH (ref 3.8–10.5)
WBC # FLD AUTO: 13.1 K/UL — HIGH (ref 3.8–10.5)
WBC # FLD AUTO: 13.11 K/UL — HIGH (ref 3.8–10.5)
WBC # FLD AUTO: 13.16 K/UL — HIGH (ref 3.8–10.5)
WBC # FLD AUTO: 13.29 K/UL — HIGH (ref 3.8–10.5)
WBC # FLD AUTO: 13.3 K/UL — HIGH (ref 3.8–10.5)
WBC # FLD AUTO: 13.38 K/UL — HIGH (ref 3.8–10.5)
WBC # FLD AUTO: 13.4 K/UL — HIGH (ref 3.8–10.5)
WBC # FLD AUTO: 13.56 K/UL — HIGH (ref 3.8–10.5)
WBC # FLD AUTO: 13.8 K/UL — HIGH (ref 3.8–10.5)
WBC # FLD AUTO: 13.92 K/UL — HIGH (ref 3.8–10.5)
WBC # FLD AUTO: 14 K/UL — HIGH (ref 3.8–10.5)
WBC # FLD AUTO: 14.34 K/UL — HIGH (ref 3.8–10.5)
WBC # FLD AUTO: 14.39 K/UL — HIGH (ref 3.8–10.5)
WBC # FLD AUTO: 14.46 K/UL — HIGH (ref 3.8–10.5)
WBC # FLD AUTO: 14.46 K/UL — HIGH (ref 3.8–10.5)
WBC # FLD AUTO: 14.53 K/UL — HIGH (ref 3.8–10.5)
WBC # FLD AUTO: 14.59 K/UL — HIGH (ref 3.8–10.5)
WBC # FLD AUTO: 14.69 K/UL — HIGH (ref 3.8–10.5)
WBC # FLD AUTO: 14.74 K/UL — HIGH (ref 3.8–10.5)
WBC # FLD AUTO: 14.78 K/UL — HIGH (ref 3.8–10.5)
WBC # FLD AUTO: 14.88 K/UL — HIGH (ref 3.8–10.5)
WBC # FLD AUTO: 14.9 K/UL — HIGH (ref 3.8–10.5)
WBC # FLD AUTO: 15.05 K/UL — HIGH (ref 3.8–10.5)
WBC # FLD AUTO: 15.05 K/UL — HIGH (ref 3.8–10.5)
WBC # FLD AUTO: 15.08 K/UL — HIGH (ref 3.8–10.5)
WBC # FLD AUTO: 15.13 K/UL — HIGH (ref 3.8–10.5)
WBC # FLD AUTO: 15.26 K/UL — HIGH (ref 3.8–10.5)
WBC # FLD AUTO: 15.76 K/UL — HIGH (ref 3.8–10.5)
WBC # FLD AUTO: 15.96 K/UL — HIGH (ref 3.8–10.5)
WBC # FLD AUTO: 16.02 K/UL — HIGH (ref 3.8–10.5)
WBC # FLD AUTO: 16.21 K/UL — HIGH (ref 3.8–10.5)
WBC # FLD AUTO: 16.31 K/UL — HIGH (ref 3.8–10.5)
WBC # FLD AUTO: 16.53 K/UL — HIGH (ref 3.8–10.5)
WBC # FLD AUTO: 16.63 K/UL — HIGH (ref 3.8–10.5)
WBC # FLD AUTO: 16.73 K/UL — HIGH (ref 3.8–10.5)
WBC # FLD AUTO: 16.92 K/UL — HIGH (ref 3.8–10.5)
WBC # FLD AUTO: 17.1 K/UL — HIGH (ref 3.8–10.5)
WBC # FLD AUTO: 17.18 K/UL — HIGH (ref 3.8–10.5)
WBC # FLD AUTO: 17.3 K/UL — HIGH (ref 3.8–10.5)
WBC # FLD AUTO: 17.37 K/UL — HIGH (ref 3.8–10.5)
WBC # FLD AUTO: 17.47 K/UL — HIGH (ref 3.8–10.5)
WBC # FLD AUTO: 17.7 K/UL — HIGH (ref 3.8–10.5)
WBC # FLD AUTO: 19.54 K/UL — HIGH (ref 3.8–10.5)
WBC # FLD AUTO: 20.03 K/UL — HIGH (ref 3.8–10.5)
WBC # FLD AUTO: 20.22 K/UL — HIGH (ref 3.8–10.5)
WBC # FLD AUTO: 20.85 K/UL — HIGH (ref 3.8–10.5)
WBC # FLD AUTO: 21.43 K/UL — HIGH (ref 3.8–10.5)
WBC # FLD AUTO: 24.68 K/UL — HIGH (ref 3.8–10.5)
WBC # FLD AUTO: 28.81 K/UL — HIGH (ref 3.8–10.5)
WBC # FLD AUTO: 3.81 K/UL — SIGNIFICANT CHANGE UP (ref 3.8–10.5)
WBC # FLD AUTO: 30.01 K/UL — HIGH (ref 3.8–10.5)
WBC # FLD AUTO: 34.44 K/UL — HIGH (ref 3.8–10.5)
WBC # FLD AUTO: 4.09 K/UL — SIGNIFICANT CHANGE UP (ref 3.8–10.5)
WBC # FLD AUTO: 4.27 K/UL — SIGNIFICANT CHANGE UP (ref 3.8–10.5)
WBC # FLD AUTO: 4.44 K/UL — SIGNIFICANT CHANGE UP (ref 3.8–10.5)
WBC # FLD AUTO: 4.93 K/UL — SIGNIFICANT CHANGE UP (ref 3.8–10.5)
WBC # FLD AUTO: 5.18 K/UL — SIGNIFICANT CHANGE UP (ref 3.8–10.5)
WBC # FLD AUTO: 5.2 K/UL — SIGNIFICANT CHANGE UP (ref 3.8–10.5)
WBC # FLD AUTO: 5.34 K/UL — SIGNIFICANT CHANGE UP (ref 3.8–10.5)
WBC # FLD AUTO: 5.6 K/UL — SIGNIFICANT CHANGE UP (ref 3.8–10.5)
WBC # FLD AUTO: 5.66 K/UL — SIGNIFICANT CHANGE UP (ref 3.8–10.5)
WBC # FLD AUTO: 5.8 K/UL — SIGNIFICANT CHANGE UP (ref 3.8–10.5)
WBC # FLD AUTO: 5.88 K/UL — SIGNIFICANT CHANGE UP (ref 3.8–10.5)
WBC # FLD AUTO: 5.96 K/UL — SIGNIFICANT CHANGE UP (ref 3.8–10.5)
WBC # FLD AUTO: 5.97 K/UL — SIGNIFICANT CHANGE UP (ref 3.8–10.5)
WBC # FLD AUTO: 5.98 K/UL — SIGNIFICANT CHANGE UP (ref 3.8–10.5)
WBC # FLD AUTO: 6.1 K/UL — SIGNIFICANT CHANGE UP (ref 3.8–10.5)
WBC # FLD AUTO: 6.11 K/UL — SIGNIFICANT CHANGE UP (ref 3.8–10.5)
WBC # FLD AUTO: 6.15 K/UL — SIGNIFICANT CHANGE UP (ref 3.8–10.5)
WBC # FLD AUTO: 6.33 K/UL — SIGNIFICANT CHANGE UP (ref 3.8–10.5)
WBC # FLD AUTO: 6.4 K/UL — SIGNIFICANT CHANGE UP (ref 3.8–10.5)
WBC # FLD AUTO: 6.41 K/UL — SIGNIFICANT CHANGE UP (ref 3.8–10.5)
WBC # FLD AUTO: 6.43 K/UL — SIGNIFICANT CHANGE UP (ref 3.8–10.5)
WBC # FLD AUTO: 6.44 K/UL — SIGNIFICANT CHANGE UP (ref 3.8–10.5)
WBC # FLD AUTO: 6.47 K/UL — SIGNIFICANT CHANGE UP (ref 3.8–10.5)
WBC # FLD AUTO: 6.57 K/UL — SIGNIFICANT CHANGE UP (ref 3.8–10.5)
WBC # FLD AUTO: 6.6 K/UL — SIGNIFICANT CHANGE UP (ref 3.8–10.5)
WBC # FLD AUTO: 6.65 K/UL — SIGNIFICANT CHANGE UP (ref 3.8–10.5)
WBC # FLD AUTO: 6.73 K/UL — SIGNIFICANT CHANGE UP (ref 3.8–10.5)
WBC # FLD AUTO: 6.79 K/UL — SIGNIFICANT CHANGE UP (ref 3.8–10.5)
WBC # FLD AUTO: 6.8 K/UL — SIGNIFICANT CHANGE UP (ref 3.8–10.5)
WBC # FLD AUTO: 6.8 K/UL — SIGNIFICANT CHANGE UP (ref 3.8–10.5)
WBC # FLD AUTO: 6.81 K/UL — SIGNIFICANT CHANGE UP (ref 3.8–10.5)
WBC # FLD AUTO: 6.84 K/UL — SIGNIFICANT CHANGE UP (ref 3.8–10.5)
WBC # FLD AUTO: 6.87 K/UL — SIGNIFICANT CHANGE UP (ref 3.8–10.5)
WBC # FLD AUTO: 6.91 K/UL — SIGNIFICANT CHANGE UP (ref 3.8–10.5)
WBC # FLD AUTO: 6.93 K/UL — SIGNIFICANT CHANGE UP (ref 3.8–10.5)
WBC # FLD AUTO: 6.94 K/UL — SIGNIFICANT CHANGE UP (ref 3.8–10.5)
WBC # FLD AUTO: 7.02 K/UL — SIGNIFICANT CHANGE UP (ref 3.8–10.5)
WBC # FLD AUTO: 7.09 K/UL — SIGNIFICANT CHANGE UP (ref 3.8–10.5)
WBC # FLD AUTO: 7.09 K/UL — SIGNIFICANT CHANGE UP (ref 3.8–10.5)
WBC # FLD AUTO: 7.19 K/UL — SIGNIFICANT CHANGE UP (ref 3.8–10.5)
WBC # FLD AUTO: 7.22 K/UL — SIGNIFICANT CHANGE UP (ref 3.8–10.5)
WBC # FLD AUTO: 7.25 K/UL — SIGNIFICANT CHANGE UP (ref 3.8–10.5)
WBC # FLD AUTO: 7.25 K/UL — SIGNIFICANT CHANGE UP (ref 3.8–10.5)
WBC # FLD AUTO: 7.3 K/UL — SIGNIFICANT CHANGE UP (ref 3.8–10.5)
WBC # FLD AUTO: 7.33 K/UL — SIGNIFICANT CHANGE UP (ref 3.8–10.5)
WBC # FLD AUTO: 7.33 K/UL — SIGNIFICANT CHANGE UP (ref 3.8–10.5)
WBC # FLD AUTO: 7.39 K/UL — SIGNIFICANT CHANGE UP (ref 3.8–10.5)
WBC # FLD AUTO: 7.39 K/UL — SIGNIFICANT CHANGE UP (ref 3.8–10.5)
WBC # FLD AUTO: 7.4 K/UL — SIGNIFICANT CHANGE UP (ref 3.8–10.5)
WBC # FLD AUTO: 7.43 K/UL — SIGNIFICANT CHANGE UP (ref 3.8–10.5)
WBC # FLD AUTO: 7.45 K/UL — SIGNIFICANT CHANGE UP (ref 3.8–10.5)
WBC # FLD AUTO: 7.52 K/UL — SIGNIFICANT CHANGE UP (ref 3.8–10.5)
WBC # FLD AUTO: 7.55 K/UL — SIGNIFICANT CHANGE UP (ref 3.8–10.5)
WBC # FLD AUTO: 7.6 K/UL — SIGNIFICANT CHANGE UP (ref 3.8–10.5)
WBC # FLD AUTO: 7.64 K/UL — SIGNIFICANT CHANGE UP (ref 3.8–10.5)
WBC # FLD AUTO: 7.7 K/UL — SIGNIFICANT CHANGE UP (ref 3.8–10.5)
WBC # FLD AUTO: 7.7 K/UL — SIGNIFICANT CHANGE UP (ref 3.8–10.5)
WBC # FLD AUTO: 7.75 K/UL — SIGNIFICANT CHANGE UP (ref 3.8–10.5)
WBC # FLD AUTO: 7.9 K/UL — SIGNIFICANT CHANGE UP (ref 3.8–10.5)
WBC # FLD AUTO: 7.93 K/UL — SIGNIFICANT CHANGE UP (ref 3.8–10.5)
WBC # FLD AUTO: 7.94 K/UL — SIGNIFICANT CHANGE UP (ref 3.8–10.5)
WBC # FLD AUTO: 7.96 K/UL — SIGNIFICANT CHANGE UP (ref 3.8–10.5)
WBC # FLD AUTO: 8 K/UL — SIGNIFICANT CHANGE UP (ref 3.8–10.5)
WBC # FLD AUTO: 8 K/UL — SIGNIFICANT CHANGE UP (ref 3.8–10.5)
WBC # FLD AUTO: 8.03 K/UL — SIGNIFICANT CHANGE UP (ref 3.8–10.5)
WBC # FLD AUTO: 8.04 K/UL — SIGNIFICANT CHANGE UP (ref 3.8–10.5)
WBC # FLD AUTO: 8.05 K/UL — SIGNIFICANT CHANGE UP (ref 3.8–10.5)
WBC # FLD AUTO: 8.09 K/UL — SIGNIFICANT CHANGE UP (ref 3.8–10.5)
WBC # FLD AUTO: 8.1 K/UL — SIGNIFICANT CHANGE UP (ref 3.8–10.5)
WBC # FLD AUTO: 8.13 K/UL — SIGNIFICANT CHANGE UP (ref 3.8–10.5)
WBC # FLD AUTO: 8.2 K/UL — SIGNIFICANT CHANGE UP (ref 3.8–10.5)
WBC # FLD AUTO: 8.23 K/UL — SIGNIFICANT CHANGE UP (ref 3.8–10.5)
WBC # FLD AUTO: 8.28 K/UL — SIGNIFICANT CHANGE UP (ref 3.8–10.5)
WBC # FLD AUTO: 8.29 K/UL — SIGNIFICANT CHANGE UP (ref 3.8–10.5)
WBC # FLD AUTO: 8.29 K/UL — SIGNIFICANT CHANGE UP (ref 3.8–10.5)
WBC # FLD AUTO: 8.39 K/UL — SIGNIFICANT CHANGE UP (ref 3.8–10.5)
WBC # FLD AUTO: 8.42 K/UL — SIGNIFICANT CHANGE UP (ref 3.8–10.5)
WBC # FLD AUTO: 8.62 K/UL — SIGNIFICANT CHANGE UP (ref 3.8–10.5)
WBC # FLD AUTO: 8.75 K/UL — SIGNIFICANT CHANGE UP (ref 3.8–10.5)
WBC # FLD AUTO: 8.76 K/UL — SIGNIFICANT CHANGE UP (ref 3.8–10.5)
WBC # FLD AUTO: 8.85 K/UL — SIGNIFICANT CHANGE UP (ref 3.8–10.5)
WBC # FLD AUTO: 8.9 K/UL — SIGNIFICANT CHANGE UP (ref 3.8–10.5)
WBC # FLD AUTO: 8.92 K/UL — SIGNIFICANT CHANGE UP (ref 3.8–10.5)
WBC # FLD AUTO: 8.96 K/UL — SIGNIFICANT CHANGE UP (ref 3.8–10.5)
WBC # FLD AUTO: 9 K/UL — SIGNIFICANT CHANGE UP (ref 3.8–10.5)
WBC # FLD AUTO: 9 K/UL — SIGNIFICANT CHANGE UP (ref 3.8–10.5)
WBC # FLD AUTO: 9.08 K/UL — SIGNIFICANT CHANGE UP (ref 3.8–10.5)
WBC # FLD AUTO: 9.14 K/UL — SIGNIFICANT CHANGE UP (ref 3.8–10.5)
WBC # FLD AUTO: 9.17 K/UL — SIGNIFICANT CHANGE UP (ref 3.8–10.5)
WBC # FLD AUTO: 9.19 K/UL — SIGNIFICANT CHANGE UP (ref 3.8–10.5)
WBC # FLD AUTO: 9.2 K/UL — SIGNIFICANT CHANGE UP (ref 3.8–10.5)
WBC # FLD AUTO: 9.3 K/UL — SIGNIFICANT CHANGE UP (ref 3.8–10.5)
WBC # FLD AUTO: 9.34 K/UL — SIGNIFICANT CHANGE UP (ref 3.8–10.5)
WBC # FLD AUTO: 9.35 K/UL — SIGNIFICANT CHANGE UP (ref 3.8–10.5)
WBC # FLD AUTO: 9.37 K/UL — SIGNIFICANT CHANGE UP (ref 3.8–10.5)
WBC # FLD AUTO: 9.39 K/UL — SIGNIFICANT CHANGE UP (ref 3.8–10.5)
WBC # FLD AUTO: 9.4 K/UL — SIGNIFICANT CHANGE UP (ref 3.8–10.5)
WBC # FLD AUTO: 9.44 K/UL — SIGNIFICANT CHANGE UP (ref 3.8–10.5)
WBC # FLD AUTO: 9.54 K/UL — SIGNIFICANT CHANGE UP (ref 3.8–10.5)
WBC # FLD AUTO: 9.6 K/UL — SIGNIFICANT CHANGE UP (ref 3.8–10.5)
WBC # FLD AUTO: 9.67 K/UL — SIGNIFICANT CHANGE UP (ref 3.8–10.5)
WBC # FLD AUTO: 9.68 K/UL — SIGNIFICANT CHANGE UP (ref 3.8–10.5)
WBC # FLD AUTO: 9.69 K/UL — SIGNIFICANT CHANGE UP (ref 3.8–10.5)
WBC # FLD AUTO: 9.7 K/UL — SIGNIFICANT CHANGE UP (ref 3.8–10.5)
WBC # FLD AUTO: 9.74 K/UL — SIGNIFICANT CHANGE UP (ref 3.8–10.5)
WBC # FLD AUTO: 9.75 K/UL — SIGNIFICANT CHANGE UP (ref 3.8–10.5)
WBC # FLD AUTO: 9.82 K/UL — SIGNIFICANT CHANGE UP (ref 3.8–10.5)
WBC # FLD AUTO: 9.83 K/UL — SIGNIFICANT CHANGE UP (ref 3.8–10.5)
WBC # FLD AUTO: 9.84 K/UL — SIGNIFICANT CHANGE UP (ref 3.8–10.5)
WBC # FLD AUTO: 9.86 K/UL — SIGNIFICANT CHANGE UP (ref 3.8–10.5)
WBC # FLD AUTO: 9.91 K/UL — SIGNIFICANT CHANGE UP (ref 3.8–10.5)
WBC UR QL: 349 /HPF — HIGH (ref 0–5)
WBC UR QL: 49 /HPF — HIGH (ref 0–5)
WBC UR QL: 819 /HPF — HIGH (ref 0–5)

## 2019-01-01 PROCEDURE — 85027 COMPLETE CBC AUTOMATED: CPT

## 2019-01-01 PROCEDURE — 92012 INTRM OPH EXAM EST PATIENT: CPT

## 2019-01-01 PROCEDURE — 43621 REMOVAL OF STOMACH: CPT | Mod: GC

## 2019-01-01 PROCEDURE — 99233 SBSQ HOSP IP/OBS HIGH 50: CPT

## 2019-01-01 PROCEDURE — C1883: CPT

## 2019-01-01 PROCEDURE — 96365 THER/PROPH/DIAG IV INF INIT: CPT | Mod: XU

## 2019-01-01 PROCEDURE — 99233 SBSQ HOSP IP/OBS HIGH 50: CPT | Mod: GC

## 2019-01-01 PROCEDURE — 80048 BASIC METABOLIC PNL TOTAL CA: CPT

## 2019-01-01 PROCEDURE — 82565 ASSAY OF CREATININE: CPT

## 2019-01-01 PROCEDURE — 99231 SBSQ HOSP IP/OBS SF/LOW 25: CPT

## 2019-01-01 PROCEDURE — 85025 COMPLETE CBC W/AUTO DIFF WBC: CPT

## 2019-01-01 PROCEDURE — 75726 ARTERY X-RAYS ABDOMEN: CPT

## 2019-01-01 PROCEDURE — C1889: CPT

## 2019-01-01 PROCEDURE — 86901 BLOOD TYPING SEROLOGIC RH(D): CPT

## 2019-01-01 PROCEDURE — 96361 HYDRATE IV INFUSION ADD-ON: CPT | Mod: XU

## 2019-01-01 PROCEDURE — 85730 THROMBOPLASTIN TIME PARTIAL: CPT

## 2019-01-01 PROCEDURE — 87086 URINE CULTURE/COLONY COUNT: CPT

## 2019-01-01 PROCEDURE — 75774 ARTERY X-RAY EACH VESSEL: CPT | Mod: 26,59

## 2019-01-01 PROCEDURE — P9016: CPT

## 2019-01-01 PROCEDURE — 99232 SBSQ HOSP IP/OBS MODERATE 35: CPT

## 2019-01-01 PROCEDURE — 84132 ASSAY OF SERUM POTASSIUM: CPT

## 2019-01-01 PROCEDURE — 95991 SPIN/BRAIN PUMP REFIL & MAIN: CPT

## 2019-01-01 PROCEDURE — 99291 CRITICAL CARE FIRST HOUR: CPT

## 2019-01-01 PROCEDURE — 83735 ASSAY OF MAGNESIUM: CPT

## 2019-01-01 PROCEDURE — 71045 X-RAY EXAM CHEST 1 VIEW: CPT | Mod: 26,77

## 2019-01-01 PROCEDURE — 85610 PROTHROMBIN TIME: CPT

## 2019-01-01 PROCEDURE — 71045 X-RAY EXAM CHEST 1 VIEW: CPT | Mod: 26

## 2019-01-01 PROCEDURE — 87040 BLOOD CULTURE FOR BACTERIA: CPT

## 2019-01-01 PROCEDURE — 63685 INS/RPLC SPI NPG/RCVR POCKET: CPT | Mod: 58

## 2019-01-01 PROCEDURE — 93005 ELECTROCARDIOGRAM TRACING: CPT

## 2019-01-01 PROCEDURE — 86900 BLOOD TYPING SEROLOGIC ABO: CPT

## 2019-01-01 PROCEDURE — 36245 INS CATH ABD/L-EXT ART 1ST: CPT

## 2019-01-01 PROCEDURE — 99285 EMERGENCY DEPT VISIT HI MDM: CPT | Mod: 25

## 2019-01-01 PROCEDURE — 94002 VENT MGMT INPAT INIT DAY: CPT

## 2019-01-01 PROCEDURE — 36600 WITHDRAWAL OF ARTERIAL BLOOD: CPT

## 2019-01-01 PROCEDURE — 12345: CPT | Mod: NC

## 2019-01-01 PROCEDURE — 93010 ELECTROCARDIOGRAM REPORT: CPT | Mod: 77

## 2019-01-01 PROCEDURE — 49320 DIAG LAPARO SEPARATE PROC: CPT | Mod: 59,GC

## 2019-01-01 PROCEDURE — 36415 COLL VENOUS BLD VENIPUNCTURE: CPT

## 2019-01-01 PROCEDURE — 96374 THER/PROPH/DIAG INJ IV PUSH: CPT | Mod: XU

## 2019-01-01 PROCEDURE — 86923 COMPATIBILITY TEST ELECTRIC: CPT

## 2019-01-01 PROCEDURE — 87486 CHLMYD PNEUM DNA AMP PROBE: CPT

## 2019-01-01 PROCEDURE — 85018 HEMOGLOBIN: CPT

## 2019-01-01 PROCEDURE — 83550 IRON BINDING TEST: CPT

## 2019-01-01 PROCEDURE — 73502 X-RAY EXAM HIP UNI 2-3 VIEWS: CPT | Mod: RT

## 2019-01-01 PROCEDURE — 93010 ELECTROCARDIOGRAM REPORT: CPT

## 2019-01-01 PROCEDURE — 87581 M.PNEUMON DNA AMP PROBE: CPT

## 2019-01-01 PROCEDURE — 99213 OFFICE O/P EST LOW 20 MIN: CPT | Mod: 25

## 2019-01-01 PROCEDURE — 83605 ASSAY OF LACTIC ACID: CPT

## 2019-01-01 PROCEDURE — 51701 INSERT BLADDER CATHETER: CPT

## 2019-01-01 PROCEDURE — 45378 DIAGNOSTIC COLONOSCOPY: CPT

## 2019-01-01 PROCEDURE — 99292 CRITICAL CARE ADDL 30 MIN: CPT

## 2019-01-01 PROCEDURE — 36430 TRANSFUSION BLD/BLD COMPNT: CPT

## 2019-01-01 PROCEDURE — 81001 URINALYSIS AUTO W/SCOPE: CPT

## 2019-01-01 PROCEDURE — 97162 PT EVAL MOD COMPLEX 30 MIN: CPT

## 2019-01-01 PROCEDURE — 84145 PROCALCITONIN (PCT): CPT

## 2019-01-01 PROCEDURE — 82435 ASSAY OF BLOOD CHLORIDE: CPT

## 2019-01-01 PROCEDURE — C2617: CPT

## 2019-01-01 PROCEDURE — 74018 RADEX ABDOMEN 1 VIEW: CPT | Mod: 26

## 2019-01-01 PROCEDURE — 82803 BLOOD GASES ANY COMBINATION: CPT

## 2019-01-01 PROCEDURE — 93970 EXTREMITY STUDY: CPT | Mod: 26

## 2019-01-01 PROCEDURE — 82947 ASSAY GLUCOSE BLOOD QUANT: CPT

## 2019-01-01 PROCEDURE — 99285 EMERGENCY DEPT VISIT HI MDM: CPT

## 2019-01-01 PROCEDURE — 71275 CT ANGIOGRAPHY CHEST: CPT

## 2019-01-01 PROCEDURE — 84295 ASSAY OF SERUM SODIUM: CPT

## 2019-01-01 PROCEDURE — 63650 IMPLANT NEUROELECTRODES: CPT

## 2019-01-01 PROCEDURE — 76937 US GUIDE VASCULAR ACCESS: CPT | Mod: 26

## 2019-01-01 PROCEDURE — 86850 RBC ANTIBODY SCREEN: CPT

## 2019-01-01 PROCEDURE — 71275 CT ANGIOGRAPHY CHEST: CPT | Mod: 26

## 2019-01-01 PROCEDURE — 99238 HOSP IP/OBS DSCHRG MGMT 30/<: CPT

## 2019-01-01 PROCEDURE — 71045 X-RAY EXAM CHEST 1 VIEW: CPT

## 2019-01-01 PROCEDURE — 74174 CTA ABD&PLVS W/CONTRAST: CPT | Mod: 26

## 2019-01-01 PROCEDURE — 87798 DETECT AGENT NOS DNA AMP: CPT

## 2019-01-01 PROCEDURE — C1769: CPT

## 2019-01-01 PROCEDURE — 88312 SPECIAL STAINS GROUP 1: CPT | Mod: 26

## 2019-01-01 PROCEDURE — 87186 SC STD MICRODIL/AGAR DIL: CPT

## 2019-01-01 PROCEDURE — 85014 HEMATOCRIT: CPT

## 2019-01-01 PROCEDURE — P9011: CPT

## 2019-01-01 PROCEDURE — 99223 1ST HOSP IP/OBS HIGH 75: CPT

## 2019-01-01 PROCEDURE — 92015 DETERMINE REFRACTIVE STATE: CPT

## 2019-01-01 PROCEDURE — 74177 CT ABD & PELVIS W/CONTRAST: CPT | Mod: 26

## 2019-01-01 PROCEDURE — 84100 ASSAY OF PHOSPHORUS: CPT

## 2019-01-01 PROCEDURE — 80053 COMPREHEN METABOLIC PANEL: CPT

## 2019-01-01 PROCEDURE — 74177 CT ABD & PELVIS W/CONTRAST: CPT

## 2019-01-01 PROCEDURE — 82962 GLUCOSE BLOOD TEST: CPT

## 2019-01-01 PROCEDURE — C1887: CPT

## 2019-01-01 PROCEDURE — 97110 THERAPEUTIC EXERCISES: CPT

## 2019-01-01 PROCEDURE — 88305 TISSUE EXAM BY PATHOLOGIST: CPT

## 2019-01-01 PROCEDURE — 86077 PHYS BLOOD BANK SERV XMATCH: CPT

## 2019-01-01 PROCEDURE — 43235 EGD DIAGNOSTIC BRUSH WASH: CPT

## 2019-01-01 PROCEDURE — 97530 THERAPEUTIC ACTIVITIES: CPT

## 2019-01-01 PROCEDURE — C1820: CPT

## 2019-01-01 PROCEDURE — 76000 FLUOROSCOPY <1 HR PHYS/QHP: CPT

## 2019-01-01 PROCEDURE — P9059: CPT

## 2019-01-01 PROCEDURE — 62367 ANALYZE SPINE INFUS PUMP: CPT

## 2019-01-01 PROCEDURE — 88312 SPECIAL STAINS GROUP 1: CPT

## 2019-01-01 PROCEDURE — 74018 RADEX ABDOMEN 1 VIEW: CPT

## 2019-01-01 PROCEDURE — C9113: CPT

## 2019-01-01 PROCEDURE — 36245 INS CATH ABD/L-EXT ART 1ST: CPT | Mod: XS

## 2019-01-01 PROCEDURE — 82550 ASSAY OF CK (CPK): CPT

## 2019-01-01 PROCEDURE — 93306 TTE W/DOPPLER COMPLETE: CPT | Mod: 26

## 2019-01-01 PROCEDURE — 74178 CT ABD&PLV WO CNTR FLWD CNTR: CPT | Mod: 26

## 2019-01-01 PROCEDURE — 93005 ELECTROCARDIOGRAM TRACING: CPT | Mod: XU

## 2019-01-01 PROCEDURE — C1713: CPT

## 2019-01-01 PROCEDURE — 73502 X-RAY EXAM HIP UNI 2-3 VIEWS: CPT | Mod: 26,RT

## 2019-01-01 PROCEDURE — 99221 1ST HOSP IP/OBS SF/LOW 40: CPT

## 2019-01-01 PROCEDURE — 96376 TX/PRO/DX INJ SAME DRUG ADON: CPT | Mod: 59

## 2019-01-01 PROCEDURE — 97162 PT EVAL MOD COMPLEX 30 MIN: CPT | Mod: GP

## 2019-01-01 PROCEDURE — 63664 REVISE SPINE ELTRD PLATE: CPT | Mod: 58

## 2019-01-01 PROCEDURE — 97166 OT EVAL MOD COMPLEX 45 MIN: CPT

## 2019-01-01 PROCEDURE — 82728 ASSAY OF FERRITIN: CPT

## 2019-01-01 PROCEDURE — 99232 SBSQ HOSP IP/OBS MODERATE 35: CPT | Mod: 25

## 2019-01-01 PROCEDURE — 90686 IIV4 VACC NO PRSV 0.5 ML IM: CPT

## 2019-01-01 PROCEDURE — 96367 TX/PROPH/DG ADDL SEQ IV INF: CPT | Mod: XU

## 2019-01-01 PROCEDURE — 96375 TX/PRO/DX INJ NEW DRUG ADDON: CPT | Mod: XU

## 2019-01-01 PROCEDURE — 63685 INS/RPLC SPI NPG/RCVR POCKET: CPT

## 2019-01-01 PROCEDURE — 99285 EMERGENCY DEPT VISIT HI MDM: CPT | Mod: GC

## 2019-01-01 PROCEDURE — 43255 EGD CONTROL BLEEDING ANY: CPT

## 2019-01-01 PROCEDURE — 97163 PT EVAL HIGH COMPLEX 45 MIN: CPT

## 2019-01-01 PROCEDURE — 36248 INS CATH ABD/L-EXT ART ADDL: CPT

## 2019-01-01 PROCEDURE — 82330 ASSAY OF CALCIUM: CPT

## 2019-01-01 PROCEDURE — 88305 TISSUE EXAM BY PATHOLOGIST: CPT | Mod: 26

## 2019-01-01 PROCEDURE — 93010 ELECTROCARDIOGRAM REPORT: CPT | Mod: 76

## 2019-01-01 PROCEDURE — 93306 TTE W/DOPPLER COMPLETE: CPT

## 2019-01-01 PROCEDURE — 99291 CRITICAL CARE FIRST HOUR: CPT | Mod: 25

## 2019-01-01 PROCEDURE — 99232 SBSQ HOSP IP/OBS MODERATE 35: CPT | Mod: GC

## 2019-01-01 PROCEDURE — 75726 ARTERY X-RAYS ABDOMEN: CPT | Mod: 26

## 2019-01-01 PROCEDURE — 99239 HOSP IP/OBS DSCHRG MGMT >30: CPT

## 2019-01-01 PROCEDURE — 83540 ASSAY OF IRON: CPT

## 2019-01-01 PROCEDURE — 97530 THERAPEUTIC ACTIVITIES: CPT | Mod: GP

## 2019-01-01 PROCEDURE — 97116 GAIT TRAINING THERAPY: CPT | Mod: GP

## 2019-01-01 PROCEDURE — 99223 1ST HOSP IP/OBS HIGH 75: CPT | Mod: 25

## 2019-01-01 PROCEDURE — 84484 ASSAY OF TROPONIN QUANT: CPT

## 2019-01-01 PROCEDURE — 76770 US EXAM ABDO BACK WALL COMP: CPT

## 2019-01-01 PROCEDURE — C1787: CPT

## 2019-01-01 PROCEDURE — C1778: CPT

## 2019-01-01 PROCEDURE — 94003 VENT MGMT INPAT SUBQ DAY: CPT

## 2019-01-01 PROCEDURE — 63709 REPAIR SPINAL FLUID LEAKAGE: CPT

## 2019-01-01 PROCEDURE — 76770 US EXAM ABDO BACK WALL COMP: CPT | Mod: 26

## 2019-01-01 PROCEDURE — 75726 ARTERY X-RAYS ABDOMEN: CPT | Mod: 26,59

## 2019-01-01 PROCEDURE — 86920 COMPATIBILITY TEST SPIN: CPT

## 2019-01-01 PROCEDURE — 99233 SBSQ HOSP IP/OBS HIGH 50: CPT | Mod: 25

## 2019-01-01 PROCEDURE — 74174 CTA ABD&PLVS W/CONTRAST: CPT

## 2019-01-01 PROCEDURE — 97161 PT EVAL LOW COMPLEX 20 MIN: CPT

## 2019-01-01 PROCEDURE — 36556 INSERT NON-TUNNEL CV CATH: CPT

## 2019-01-01 PROCEDURE — 87633 RESP VIRUS 12-25 TARGETS: CPT

## 2019-01-01 PROCEDURE — 70450 CT HEAD/BRAIN W/O DYE: CPT | Mod: 26

## 2019-01-01 PROCEDURE — 62368 ANALYZE SP INF PUMP W/REPROG: CPT

## 2019-01-01 PROCEDURE — 63664 REVISE SPINE ELTRD PLATE: CPT

## 2019-01-01 PROCEDURE — 99024 POSTOP FOLLOW-UP VISIT: CPT | Mod: NC

## 2019-01-01 PROCEDURE — 70450 CT HEAD/BRAIN W/O DYE: CPT

## 2019-01-01 PROCEDURE — 82553 CREATINE MB FRACTION: CPT

## 2019-01-01 PROCEDURE — 86803 HEPATITIS C AB TEST: CPT

## 2019-01-01 PROCEDURE — 99223 1ST HOSP IP/OBS HIGH 75: CPT | Mod: GC

## 2019-01-01 PROCEDURE — 71046 X-RAY EXAM CHEST 2 VIEWS: CPT | Mod: 26

## 2019-01-01 PROCEDURE — 96374 THER/PROPH/DIAG INJ IV PUSH: CPT | Mod: 59

## 2019-01-01 PROCEDURE — 71045 X-RAY EXAM CHEST 1 VIEW: CPT | Mod: 26,76

## 2019-01-01 PROCEDURE — 37244 VASC EMBOLIZE/OCCLUDE BLEED: CPT

## 2019-01-01 PROCEDURE — G0008: CPT

## 2019-01-01 PROCEDURE — 74241: CPT | Mod: 26

## 2019-01-01 PROCEDURE — 99222 1ST HOSP IP/OBS MODERATE 55: CPT | Mod: GC

## 2019-01-01 PROCEDURE — 96375 TX/PRO/DX INJ NEW DRUG ADDON: CPT | Mod: 59

## 2019-01-01 PROCEDURE — 82272 OCCULT BLD FECES 1-3 TESTS: CPT

## 2019-01-01 PROCEDURE — 36246 INS CATH ABD/L-EXT ART 2ND: CPT | Mod: XS

## 2019-01-01 PROCEDURE — 99223 1ST HOSP IP/OBS HIGH 75: CPT | Mod: 57

## 2019-01-01 PROCEDURE — 99291 CRITICAL CARE FIRST HOUR: CPT | Mod: GC

## 2019-01-01 PROCEDURE — 83690 ASSAY OF LIPASE: CPT

## 2019-01-01 RX ORDER — MAGNESIUM HYDROXIDE 400 MG/1
30 TABLET, CHEWABLE ORAL DAILY
Refills: 0 | Status: DISCONTINUED | OUTPATIENT
Start: 2019-01-01 | End: 2019-01-01

## 2019-01-01 RX ORDER — PANTOPRAZOLE SODIUM 20 MG/1
0 TABLET, DELAYED RELEASE ORAL
Qty: 0 | Refills: 0 | DISCHARGE
Start: 2019-01-01

## 2019-01-01 RX ORDER — ICOSAPENT ETHYL 500 MG/1
2 CAPSULE, LIQUID FILLED ORAL
Qty: 0 | Refills: 0 | DISCHARGE

## 2019-01-01 RX ORDER — PANTOPRAZOLE SODIUM 20 MG/1
1 TABLET, DELAYED RELEASE ORAL
Qty: 0 | Refills: 0 | DISCHARGE

## 2019-01-01 RX ORDER — BACLOFEN 100 %
1 POWDER (GRAM) MISCELLANEOUS
Qty: 60 | Refills: 0
Start: 2019-01-01 | End: 2019-12-09

## 2019-01-01 RX ORDER — ZOLPIDEM TARTRATE 10 MG/1
5 TABLET ORAL AT BEDTIME
Refills: 0 | Status: DISCONTINUED | OUTPATIENT
Start: 2019-01-01 | End: 2019-01-01

## 2019-01-01 RX ORDER — LIDOCAINE 4 G/100G
2 CREAM TOPICAL EVERY 24 HOURS
Refills: 0 | Status: DISCONTINUED | OUTPATIENT
Start: 2019-01-01 | End: 2019-01-01

## 2019-01-01 RX ORDER — GABAPENTIN 400 MG/1
800 CAPSULE ORAL THREE TIMES A DAY
Refills: 0 | Status: DISCONTINUED | OUTPATIENT
Start: 2019-01-01 | End: 2019-01-01

## 2019-01-01 RX ORDER — LABETALOL HCL 100 MG
100 TABLET ORAL EVERY 12 HOURS
Refills: 0 | Status: DISCONTINUED | OUTPATIENT
Start: 2019-01-01 | End: 2019-01-01

## 2019-01-01 RX ORDER — SODIUM CHLORIDE 5 %
1 DROPS OPHTHALMIC (EYE)
Qty: 0 | Refills: 0 | DISCHARGE

## 2019-01-01 RX ORDER — OXYCODONE AND ACETAMINOPHEN 5; 325 MG/1; MG/1
2 TABLET ORAL ONCE
Refills: 0 | Status: DISCONTINUED | OUTPATIENT
Start: 2019-01-01 | End: 2019-01-01

## 2019-01-01 RX ORDER — SODIUM CHLORIDE 9 MG/ML
500 INJECTION, SOLUTION INTRAVENOUS ONCE
Refills: 0 | Status: COMPLETED | OUTPATIENT
Start: 2019-01-01 | End: 2019-01-01

## 2019-01-01 RX ORDER — SODIUM CHLORIDE 5 %
1 DROPS OPHTHALMIC (EYE) DAILY
Refills: 0 | Status: DISCONTINUED | OUTPATIENT
Start: 2019-01-01 | End: 2019-01-01

## 2019-01-01 RX ORDER — HYDROMORPHONE HYDROCHLORIDE 2 MG/ML
0.5 INJECTION INTRAMUSCULAR; INTRAVENOUS; SUBCUTANEOUS ONCE
Refills: 0 | Status: DISCONTINUED | OUTPATIENT
Start: 2019-01-01 | End: 2019-01-01

## 2019-01-01 RX ORDER — ONDANSETRON 8 MG/1
4 TABLET, FILM COATED ORAL ONCE
Refills: 0 | Status: COMPLETED | OUTPATIENT
Start: 2019-01-01 | End: 2019-01-01

## 2019-01-01 RX ORDER — BACLOFEN 100 %
1 POWDER (GRAM) MISCELLANEOUS
Qty: 0 | Refills: 0 | DISCHARGE

## 2019-01-01 RX ORDER — AMLODIPINE BESYLATE 2.5 MG/1
5 TABLET ORAL DAILY
Refills: 0 | Status: DISCONTINUED | OUTPATIENT
Start: 2019-01-01 | End: 2019-01-01

## 2019-01-01 RX ORDER — PANTOPRAZOLE SODIUM 20 MG/1
40 TABLET, DELAYED RELEASE ORAL EVERY 12 HOURS
Refills: 0 | Status: DISCONTINUED | OUTPATIENT
Start: 2019-01-01 | End: 2019-01-01

## 2019-01-01 RX ORDER — OXYCODONE HYDROCHLORIDE 5 MG/1
1 TABLET ORAL
Qty: 15 | Refills: 0
Start: 2019-01-01 | End: 2019-01-01

## 2019-01-01 RX ORDER — PIPERACILLIN AND TAZOBACTAM 4; .5 G/20ML; G/20ML
3.38 INJECTION, POWDER, LYOPHILIZED, FOR SOLUTION INTRAVENOUS EVERY 8 HOURS
Refills: 0 | Status: DISCONTINUED | OUTPATIENT
Start: 2019-01-01 | End: 2019-01-01

## 2019-01-01 RX ORDER — POTASSIUM CHLORIDE 20 MEQ
10 PACKET (EA) ORAL
Refills: 0 | Status: COMPLETED | OUTPATIENT
Start: 2019-01-01 | End: 2019-01-01

## 2019-01-01 RX ORDER — PANTOPRAZOLE SODIUM 20 MG/1
1 TABLET, DELAYED RELEASE ORAL
Qty: 30 | Refills: 0
Start: 2019-01-01 | End: 2019-12-09

## 2019-01-01 RX ORDER — ATORVASTATIN CALCIUM 80 MG/1
1 TABLET, FILM COATED ORAL
Qty: 30 | Refills: 0
Start: 2019-01-01 | End: 2019-12-09

## 2019-01-01 RX ORDER — PANTOPRAZOLE SODIUM 20 MG/1
40 TABLET, DELAYED RELEASE ORAL
Refills: 0 | Status: DISCONTINUED | OUTPATIENT
Start: 2019-01-01 | End: 2019-01-01

## 2019-01-01 RX ORDER — PANTOPRAZOLE SODIUM 20 MG/1
80 TABLET, DELAYED RELEASE ORAL ONCE
Refills: 0 | Status: COMPLETED | OUTPATIENT
Start: 2019-01-01 | End: 2019-01-01

## 2019-01-01 RX ORDER — DULOXETINE HYDROCHLORIDE 30 MG/1
20 CAPSULE, DELAYED RELEASE ORAL
Refills: 0 | Status: DISCONTINUED | OUTPATIENT
Start: 2019-01-01 | End: 2019-01-01

## 2019-01-01 RX ORDER — LABETALOL HCL 100 MG
10 TABLET ORAL ONCE
Refills: 0 | Status: COMPLETED | OUTPATIENT
Start: 2019-01-01 | End: 2019-01-01

## 2019-01-01 RX ORDER — GABAPENTIN 400 MG/1
600 CAPSULE ORAL THREE TIMES A DAY
Refills: 0 | Status: DISCONTINUED | OUTPATIENT
Start: 2019-01-01 | End: 2019-01-01

## 2019-01-01 RX ORDER — OXYCODONE HYDROCHLORIDE 5 MG/1
5 TABLET ORAL EVERY 6 HOURS
Refills: 0 | Status: DISCONTINUED | OUTPATIENT
Start: 2019-01-01 | End: 2019-01-01

## 2019-01-01 RX ORDER — SODIUM CHLORIDE 9 MG/ML
1000 INJECTION INTRAMUSCULAR; INTRAVENOUS; SUBCUTANEOUS ONCE
Refills: 0 | Status: COMPLETED | OUTPATIENT
Start: 2019-01-01 | End: 2019-01-01

## 2019-01-01 RX ORDER — OXYCODONE HYDROCHLORIDE 5 MG/1
10 TABLET ORAL ONCE
Refills: 0 | Status: DISCONTINUED | OUTPATIENT
Start: 2019-01-01 | End: 2019-01-01

## 2019-01-01 RX ORDER — SODIUM CHLORIDE 5 %
2 DROPS OPHTHALMIC (EYE)
Refills: 0 | Status: DISCONTINUED | OUTPATIENT
Start: 2019-01-01 | End: 2019-01-01

## 2019-01-01 RX ORDER — MORPHINE SULFATE 50 MG/1
2 CAPSULE, EXTENDED RELEASE ORAL ONCE
Refills: 0 | Status: DISCONTINUED | OUTPATIENT
Start: 2019-01-01 | End: 2019-01-01

## 2019-01-01 RX ORDER — SODIUM CHLORIDE 9 MG/ML
1000 INJECTION, SOLUTION INTRAVENOUS ONCE
Refills: 0 | Status: COMPLETED | OUTPATIENT
Start: 2019-01-01 | End: 2019-01-01

## 2019-01-01 RX ORDER — NOREPINEPHRINE BITARTRATE/D5W 8 MG/250ML
0.05 PLASTIC BAG, INJECTION (ML) INTRAVENOUS
Qty: 16 | Refills: 0 | Status: DISCONTINUED | OUTPATIENT
Start: 2019-01-01 | End: 2019-01-01

## 2019-01-01 RX ORDER — SODIUM CHLORIDE 5 %
1 DROPS OPHTHALMIC (EYE)
Qty: 1 | Refills: 0
Start: 2019-01-01 | End: 2019-12-09

## 2019-01-01 RX ORDER — DEXMEDETOMIDINE HYDROCHLORIDE IN 0.9% SODIUM CHLORIDE 4 UG/ML
0.2 INJECTION INTRAVENOUS
Qty: 200 | Refills: 0 | Status: DISCONTINUED | OUTPATIENT
Start: 2019-01-01 | End: 2019-01-01

## 2019-01-01 RX ORDER — TAPENTADOL HYDROCHLORIDE 50 MG/1
1 TABLET, FILM COATED ORAL
Qty: 0 | Refills: 0 | DISCHARGE

## 2019-01-01 RX ORDER — MORPHINE SULFATE 50 MG/1
2 CAPSULE, EXTENDED RELEASE ORAL EVERY 4 HOURS
Refills: 0 | Status: DISCONTINUED | OUTPATIENT
Start: 2019-01-01 | End: 2019-01-01

## 2019-01-01 RX ORDER — VALACYCLOVIR 500 MG/1
1 TABLET, FILM COATED ORAL
Qty: 0 | Refills: 0 | DISCHARGE
Start: 2019-01-01

## 2019-01-01 RX ORDER — CEFTRIAXONE 500 MG/1
1000 INJECTION, POWDER, FOR SOLUTION INTRAMUSCULAR; INTRAVENOUS ONCE
Refills: 0 | Status: DISCONTINUED | OUTPATIENT
Start: 2019-01-01 | End: 2019-01-01

## 2019-01-01 RX ORDER — VALACYCLOVIR 500 MG/1
1000 TABLET, FILM COATED ORAL DAILY
Refills: 0 | Status: DISCONTINUED | OUTPATIENT
Start: 2019-01-01 | End: 2019-01-01

## 2019-01-01 RX ORDER — PHENYLEPHRINE HYDROCHLORIDE 10 MG/ML
0.5 INJECTION INTRAVENOUS
Qty: 40 | Refills: 0 | Status: DISCONTINUED | OUTPATIENT
Start: 2019-01-01 | End: 2019-01-01

## 2019-01-01 RX ORDER — DICLOFENAC SODIUM 30 MG/G
0 GEL TOPICAL
Qty: 0 | Refills: 0 | DISCHARGE

## 2019-01-01 RX ORDER — LABETALOL HCL 100 MG
200 TABLET ORAL DAILY
Refills: 0 | Status: DISCONTINUED | OUTPATIENT
Start: 2019-01-01 | End: 2019-01-01

## 2019-01-01 RX ORDER — FENTANYL CITRATE 50 UG/ML
0.5 INJECTION INTRAVENOUS
Qty: 2500 | Refills: 0 | Status: DISCONTINUED | OUTPATIENT
Start: 2019-01-01 | End: 2019-01-01

## 2019-01-01 RX ORDER — PREDNISOLONE SODIUM PHOSPHATE 1 %
1 DROPS OPHTHALMIC (EYE)
Qty: 0 | Refills: 0 | DISCHARGE
Start: 2019-01-01

## 2019-01-01 RX ORDER — CARBAMIDE PEROXIDE 81.86 MG/ML
1 SOLUTION/ DROPS AURICULAR (OTIC)
Refills: 0 | Status: DISCONTINUED | OUTPATIENT
Start: 2019-01-01 | End: 2019-01-01

## 2019-01-01 RX ORDER — ATORVASTATIN CALCIUM 80 MG/1
1 TABLET, FILM COATED ORAL
Qty: 30 | Refills: 0
Start: 2019-01-01 | End: 2019-01-01

## 2019-01-01 RX ORDER — AMIODARONE HYDROCHLORIDE 400 MG/1
150 TABLET ORAL ONCE
Refills: 0 | Status: COMPLETED | OUTPATIENT
Start: 2019-01-01 | End: 2019-01-01

## 2019-01-01 RX ORDER — BACLOFEN 100 %
1 POWDER (GRAM) MISCELLANEOUS
Qty: 0 | Refills: 0 | DISCHARGE
Start: 2019-01-01

## 2019-01-01 RX ORDER — PROPOFOL 10 MG/ML
5 INJECTION, EMULSION INTRAVENOUS
Qty: 1000 | Refills: 0 | Status: DISCONTINUED | OUTPATIENT
Start: 2019-01-01 | End: 2019-01-01

## 2019-01-01 RX ORDER — VALACYCLOVIR 500 MG/1
1 TABLET, FILM COATED ORAL
Qty: 90 | Refills: 0
Start: 2019-01-01 | End: 2019-12-09

## 2019-01-01 RX ORDER — FUROSEMIDE 40 MG
20 TABLET ORAL ONCE
Refills: 0 | Status: COMPLETED | OUTPATIENT
Start: 2019-01-01 | End: 2019-01-01

## 2019-01-01 RX ORDER — PREDNISOLONE SODIUM PHOSPHATE 1 %
1 DROPS OPHTHALMIC (EYE) EVERY 6 HOURS
Refills: 0 | Status: DISCONTINUED | OUTPATIENT
Start: 2019-01-01 | End: 2019-01-01

## 2019-01-01 RX ORDER — POTASSIUM CHLORIDE 20 MEQ
40 PACKET (EA) ORAL ONCE
Refills: 0 | Status: COMPLETED | OUTPATIENT
Start: 2019-01-01 | End: 2019-01-01

## 2019-01-01 RX ORDER — PANTOPRAZOLE SODIUM 20 MG/1
8 TABLET, DELAYED RELEASE ORAL
Qty: 80 | Refills: 0 | Status: DISCONTINUED | OUTPATIENT
Start: 2019-01-01 | End: 2019-01-01

## 2019-01-01 RX ORDER — AMLODIPINE BESYLATE 2.5 MG/1
10 TABLET ORAL DAILY
Refills: 0 | Status: DISCONTINUED | OUTPATIENT
Start: 2019-01-01 | End: 2019-01-01

## 2019-01-01 RX ORDER — MORPHINE SULFATE 50 MG/1
2 CAPSULE, EXTENDED RELEASE ORAL EVERY 6 HOURS
Refills: 0 | Status: DISCONTINUED | OUTPATIENT
Start: 2019-01-01 | End: 2019-01-01

## 2019-01-01 RX ORDER — MEROPENEM 1 G/30ML
1000 INJECTION INTRAVENOUS EVERY 8 HOURS
Refills: 0 | Status: DISCONTINUED | OUTPATIENT
Start: 2019-01-01 | End: 2019-01-01

## 2019-01-01 RX ORDER — PREDNISOLONE SODIUM PHOSPHATE 1 %
1 DROPS OPHTHALMIC (EYE)
Qty: 0 | Refills: 0 | DISCHARGE

## 2019-01-01 RX ORDER — ATORVASTATIN CALCIUM 80 MG/1
40 TABLET, FILM COATED ORAL AT BEDTIME
Refills: 0 | Status: DISCONTINUED | OUTPATIENT
Start: 2019-01-01 | End: 2019-01-01

## 2019-01-01 RX ORDER — PROPOFOL 10 MG/ML
9.09 INJECTION, EMULSION INTRAVENOUS
Qty: 1000 | Refills: 0 | Status: DISCONTINUED | OUTPATIENT
Start: 2019-01-01 | End: 2019-01-01

## 2019-01-01 RX ORDER — PIPERACILLIN AND TAZOBACTAM 4; .5 G/20ML; G/20ML
3.38 INJECTION, POWDER, LYOPHILIZED, FOR SOLUTION INTRAVENOUS ONCE
Refills: 0 | Status: COMPLETED | OUTPATIENT
Start: 2019-01-01 | End: 2019-01-01

## 2019-01-01 RX ORDER — DEXTROSE 50 % IN WATER 50 %
12.5 SYRINGE (ML) INTRAVENOUS ONCE
Refills: 0 | Status: COMPLETED | OUTPATIENT
Start: 2019-01-01 | End: 2019-01-01

## 2019-01-01 RX ORDER — NOREPINEPHRINE BITARTRATE/D5W 8 MG/250ML
0.05 PLASTIC BAG, INJECTION (ML) INTRAVENOUS
Qty: 32 | Refills: 0 | Status: DISCONTINUED | OUTPATIENT
Start: 2019-01-01 | End: 2019-01-01

## 2019-01-01 RX ORDER — ELECTROLYTE SOLUTION,INJ
1 VIAL (ML) INTRAVENOUS
Refills: 0 | Status: DISCONTINUED | OUTPATIENT
Start: 2019-01-01 | End: 2019-01-01

## 2019-01-01 RX ORDER — OXYCODONE HYDROCHLORIDE 5 MG/1
1 TABLET ORAL
Qty: 0 | Refills: 0 | DISCHARGE

## 2019-01-01 RX ORDER — ONDANSETRON 8 MG/1
4 TABLET, FILM COATED ORAL EVERY 6 HOURS
Refills: 0 | Status: DISCONTINUED | OUTPATIENT
Start: 2019-01-01 | End: 2019-01-01

## 2019-01-01 RX ORDER — METRONIDAZOLE 500 MG
500 TABLET ORAL EVERY 8 HOURS
Refills: 0 | Status: DISCONTINUED | OUTPATIENT
Start: 2019-01-01 | End: 2019-01-01

## 2019-01-01 RX ORDER — HYDROMORPHONE HYDROCHLORIDE 2 MG/ML
1 INJECTION INTRAMUSCULAR; INTRAVENOUS; SUBCUTANEOUS EVERY 4 HOURS
Refills: 0 | Status: DISCONTINUED | OUTPATIENT
Start: 2019-01-01 | End: 2019-01-01

## 2019-01-01 RX ORDER — PREDNISOLONE SODIUM PHOSPHATE 1 %
1 DROPS OPHTHALMIC (EYE)
Qty: 1 | Refills: 0
Start: 2019-01-01 | End: 2019-12-09

## 2019-01-01 RX ORDER — POLYETHYLENE GLYCOL 3350 17 G/17G
17 POWDER, FOR SOLUTION ORAL DAILY
Refills: 0 | Status: DISCONTINUED | OUTPATIENT
Start: 2019-01-01 | End: 2019-01-01

## 2019-01-01 RX ORDER — SODIUM CHLORIDE 9 MG/ML
1000 INJECTION, SOLUTION INTRAVENOUS
Refills: 0 | Status: DISCONTINUED | OUTPATIENT
Start: 2019-01-01 | End: 2019-01-01

## 2019-01-01 RX ORDER — LABETALOL HCL 100 MG
10 TABLET ORAL EVERY 6 HOURS
Refills: 0 | Status: DISCONTINUED | OUTPATIENT
Start: 2019-01-01 | End: 2019-01-01

## 2019-01-01 RX ORDER — MORPHINE SULFATE 50 MG/1
4 CAPSULE, EXTENDED RELEASE ORAL ONCE
Refills: 0 | Status: DISCONTINUED | OUTPATIENT
Start: 2019-01-01 | End: 2019-01-01

## 2019-01-01 RX ORDER — SENNA PLUS 8.6 MG/1
2 TABLET ORAL AT BEDTIME
Refills: 0 | Status: DISCONTINUED | OUTPATIENT
Start: 2019-01-01 | End: 2019-01-01

## 2019-01-01 RX ORDER — METOCLOPRAMIDE HCL 10 MG
10 TABLET ORAL EVERY 6 HOURS
Refills: 0 | Status: COMPLETED | OUTPATIENT
Start: 2019-01-01 | End: 2019-01-01

## 2019-01-01 RX ORDER — INSULIN HUMAN 100 [IU]/ML
10 INJECTION, SOLUTION SUBCUTANEOUS ONCE
Refills: 0 | Status: DISCONTINUED | OUTPATIENT
Start: 2019-01-01 | End: 2019-01-01

## 2019-01-01 RX ORDER — VALACYCLOVIR 500 MG/1
1 TABLET, FILM COATED ORAL
Qty: 90 | Refills: 0
Start: 2019-01-01 | End: 2019-01-01

## 2019-01-01 RX ORDER — OXYCODONE HYDROCHLORIDE 5 MG/1
10 TABLET ORAL THREE TIMES A DAY
Refills: 0 | Status: DISCONTINUED | OUTPATIENT
Start: 2019-01-01 | End: 2019-01-01

## 2019-01-01 RX ORDER — LABETALOL HCL 100 MG
200 TABLET ORAL
Refills: 0 | Status: DISCONTINUED | OUTPATIENT
Start: 2019-01-01 | End: 2019-01-01

## 2019-01-01 RX ORDER — ZOLPIDEM TARTRATE 10 MG/1
1 TABLET ORAL
Qty: 0 | Refills: 0 | DISCHARGE

## 2019-01-01 RX ORDER — BACLOFEN 100 %
20 POWDER (GRAM) MISCELLANEOUS EVERY 12 HOURS
Refills: 0 | Status: DISCONTINUED | OUTPATIENT
Start: 2019-01-01 | End: 2019-01-01

## 2019-01-01 RX ORDER — FENTANYL CITRATE 50 UG/ML
25 INJECTION INTRAVENOUS ONCE
Refills: 0 | Status: DISCONTINUED | OUTPATIENT
Start: 2019-01-01 | End: 2019-01-01

## 2019-01-01 RX ORDER — CHLORHEXIDINE GLUCONATE 213 G/1000ML
15 SOLUTION TOPICAL EVERY 12 HOURS
Refills: 0 | Status: DISCONTINUED | OUTPATIENT
Start: 2019-01-01 | End: 2019-01-01

## 2019-01-01 RX ORDER — SODIUM CHLORIDE 5 %
1 DROPS OPHTHALMIC (EYE)
Refills: 0 | Status: DISCONTINUED | OUTPATIENT
Start: 2019-01-01 | End: 2019-01-01

## 2019-01-01 RX ORDER — SODIUM CHLORIDE 5 %
1 DROPS OPHTHALMIC (EYE)
Qty: 1 | Refills: 0
Start: 2019-01-01 | End: 2019-01-01

## 2019-01-01 RX ORDER — OXYCODONE HYDROCHLORIDE 5 MG/1
10 TABLET ORAL AT BEDTIME
Refills: 0 | Status: DISCONTINUED | OUTPATIENT
Start: 2019-01-01 | End: 2019-01-01

## 2019-01-01 RX ORDER — VANCOMYCIN HCL 1 G
1000 VIAL (EA) INTRAVENOUS ONCE
Refills: 0 | Status: COMPLETED | OUTPATIENT
Start: 2019-01-01 | End: 2019-01-01

## 2019-01-01 RX ORDER — SIMETHICONE 80 MG/1
80 TABLET, CHEWABLE ORAL EVERY 6 HOURS
Refills: 0 | Status: DISCONTINUED | OUTPATIENT
Start: 2019-01-01 | End: 2019-01-01

## 2019-01-01 RX ORDER — DULOXETINE HYDROCHLORIDE 30 MG/1
30 CAPSULE, DELAYED RELEASE ORAL
Refills: 0 | Status: DISCONTINUED | OUTPATIENT
Start: 2019-01-01 | End: 2019-01-01

## 2019-01-01 RX ORDER — ZOLPIDEM TARTRATE 10 MG/1
1 TABLET ORAL
Qty: 0 | Refills: 0 | DISCHARGE
Start: 2019-01-01

## 2019-01-01 RX ORDER — ZOLPIDEM TARTRATE 10 MG/1
1 TABLET ORAL
Qty: 5 | Refills: 0
Start: 2019-01-01 | End: 2019-01-01

## 2019-01-01 RX ORDER — POLYETHYLENE GLYCOL 3350 17 G/17G
17 POWDER, FOR SOLUTION ORAL EVERY 12 HOURS
Refills: 0 | Status: DISCONTINUED | OUTPATIENT
Start: 2019-01-01 | End: 2019-01-01

## 2019-01-01 RX ORDER — DOCUSATE SODIUM 100 MG
100 CAPSULE ORAL THREE TIMES A DAY
Refills: 0 | Status: DISCONTINUED | OUTPATIENT
Start: 2019-01-01 | End: 2019-01-01

## 2019-01-01 RX ORDER — PANTOPRAZOLE SODIUM 20 MG/1
1 TABLET, DELAYED RELEASE ORAL
Qty: 60 | Refills: 0
Start: 2019-01-01 | End: 2019-01-01

## 2019-01-01 RX ORDER — SENNA PLUS 8.6 MG/1
2 TABLET ORAL
Qty: 0 | Refills: 0 | DISCHARGE
Start: 2019-01-01

## 2019-01-01 RX ORDER — LIDOCAINE 4 G/100G
1 CREAM TOPICAL
Qty: 0 | Refills: 0 | DISCHARGE

## 2019-01-01 RX ORDER — INSULIN LISPRO 100/ML
VIAL (ML) SUBCUTANEOUS EVERY 6 HOURS
Refills: 0 | Status: DISCONTINUED | OUTPATIENT
Start: 2019-01-01 | End: 2019-01-01

## 2019-01-01 RX ORDER — DULOXETINE HYDROCHLORIDE 30 MG/1
2 CAPSULE, DELAYED RELEASE ORAL
Qty: 0 | Refills: 0 | DISCHARGE
Start: 2019-01-01

## 2019-01-01 RX ORDER — HEPARIN SODIUM 5000 [USP'U]/ML
5000 INJECTION INTRAVENOUS; SUBCUTANEOUS EVERY 12 HOURS
Refills: 0 | Status: DISCONTINUED | OUTPATIENT
Start: 2019-01-01 | End: 2019-01-01

## 2019-01-01 RX ORDER — DULOXETINE HYDROCHLORIDE 30 MG/1
1 CAPSULE, DELAYED RELEASE ORAL
Qty: 60 | Refills: 0
Start: 2019-01-01 | End: 2019-12-09

## 2019-01-01 RX ORDER — ACETAMINOPHEN 500 MG
650 TABLET ORAL ONCE
Refills: 0 | Status: COMPLETED | OUTPATIENT
Start: 2019-01-01 | End: 2019-01-01

## 2019-01-01 RX ORDER — MORPHINE SULFATE 50 MG/1
2 CAPSULE, EXTENDED RELEASE ORAL
Qty: 0 | Refills: 0 | DISCHARGE
Start: 2019-01-01

## 2019-01-01 RX ORDER — GABAPENTIN 400 MG/1
1 CAPSULE ORAL
Qty: 0 | Refills: 0 | DISCHARGE
Start: 2019-01-01

## 2019-01-01 RX ORDER — GABAPENTIN 400 MG/1
300 CAPSULE ORAL THREE TIMES A DAY
Qty: 0 | Refills: 0 | Status: DISCONTINUED | OUTPATIENT
Start: 2019-01-01 | End: 2019-01-01

## 2019-01-01 RX ORDER — MAGNESIUM SULFATE 500 MG/ML
2 VIAL (ML) INJECTION ONCE
Refills: 0 | Status: COMPLETED | OUTPATIENT
Start: 2019-01-01 | End: 2019-01-01

## 2019-01-01 RX ORDER — ASCORBIC ACID 60 MG
1 TABLET,CHEWABLE ORAL
Qty: 30 | Refills: 0
Start: 2019-01-01 | End: 2019-01-01

## 2019-01-01 RX ORDER — BACLOFEN 100 %
20 POWDER (GRAM) MISCELLANEOUS
Refills: 0 | Status: DISCONTINUED | OUTPATIENT
Start: 2019-01-01 | End: 2019-01-01

## 2019-01-01 RX ORDER — SODIUM CHLORIDE 9 MG/ML
500 INJECTION INTRAMUSCULAR; INTRAVENOUS; SUBCUTANEOUS ONCE
Refills: 0 | Status: COMPLETED | OUTPATIENT
Start: 2019-01-01 | End: 2019-01-01

## 2019-01-01 RX ORDER — GABAPENTIN 400 MG/1
300 CAPSULE ORAL THREE TIMES A DAY
Refills: 0 | Status: DISCONTINUED | OUTPATIENT
Start: 2019-01-01 | End: 2019-01-01

## 2019-01-01 RX ORDER — POTASSIUM PHOSPHATE, MONOBASIC POTASSIUM PHOSPHATE, DIBASIC 236; 224 MG/ML; MG/ML
15 INJECTION, SOLUTION INTRAVENOUS ONCE
Refills: 0 | Status: COMPLETED | OUTPATIENT
Start: 2019-01-01 | End: 2019-01-01

## 2019-01-01 RX ORDER — ACETAMINOPHEN 500 MG
650 TABLET ORAL EVERY 6 HOURS
Refills: 0 | Status: DISCONTINUED | OUTPATIENT
Start: 2019-01-01 | End: 2019-01-01

## 2019-01-01 RX ORDER — SODIUM,POTASSIUM PHOSPHATES 278-250MG
2 POWDER IN PACKET (EA) ORAL EVERY 4 HOURS
Refills: 0 | Status: COMPLETED | OUTPATIENT
Start: 2019-01-01 | End: 2019-01-01

## 2019-01-01 RX ORDER — POTASSIUM CHLORIDE 20 MEQ
10 PACKET (EA) ORAL
Refills: 0 | Status: DISCONTINUED | OUTPATIENT
Start: 2019-01-01 | End: 2019-01-01

## 2019-01-01 RX ORDER — POTASSIUM CHLORIDE 20 MEQ
20 PACKET (EA) ORAL ONCE
Refills: 0 | Status: COMPLETED | OUTPATIENT
Start: 2019-01-01 | End: 2019-01-01

## 2019-01-01 RX ORDER — ACYCLOVIR SODIUM 500 MG
1000 VIAL (EA) INTRAVENOUS EVERY 8 HOURS
Refills: 0 | Status: DISCONTINUED | OUTPATIENT
Start: 2019-01-01 | End: 2019-01-01

## 2019-01-01 RX ORDER — LIDOCAINE 4 G/100G
1 CREAM TOPICAL ONCE
Refills: 0 | Status: COMPLETED | OUTPATIENT
Start: 2019-01-01 | End: 2019-01-01

## 2019-01-01 RX ORDER — MINERAL OIL
133 OIL (ML) MISCELLANEOUS ONCE
Refills: 0 | Status: COMPLETED | OUTPATIENT
Start: 2019-01-01 | End: 2019-01-01

## 2019-01-01 RX ORDER — LABETALOL HCL 100 MG
2 TABLET ORAL
Qty: 0 | Refills: 0 | DISCHARGE

## 2019-01-01 RX ORDER — DULOXETINE HYDROCHLORIDE 30 MG/1
1 CAPSULE, DELAYED RELEASE ORAL
Qty: 0 | Refills: 0 | DISCHARGE
Start: 2019-01-01

## 2019-01-01 RX ORDER — OXYCODONE HYDROCHLORIDE 5 MG/1
10 TABLET ORAL EVERY 6 HOURS
Refills: 0 | Status: DISCONTINUED | OUTPATIENT
Start: 2019-01-01 | End: 2019-01-01

## 2019-01-01 RX ORDER — METOPROLOL TARTRATE 50 MG
5 TABLET ORAL ONCE
Refills: 0 | Status: COMPLETED | OUTPATIENT
Start: 2019-01-01 | End: 2019-01-01

## 2019-01-01 RX ORDER — INFLUENZA VIRUS VACCINE 15; 15; 15; 15 UG/.5ML; UG/.5ML; UG/.5ML; UG/.5ML
0.5 SUSPENSION INTRAMUSCULAR ONCE
Refills: 0 | Status: DISCONTINUED | OUTPATIENT
Start: 2019-01-01 | End: 2019-01-01

## 2019-01-01 RX ORDER — PANTOPRAZOLE SODIUM 20 MG/1
40 TABLET, DELAYED RELEASE ORAL
Qty: 0 | Refills: 0 | DISCHARGE
Start: 2019-01-01

## 2019-01-01 RX ORDER — PANTOPRAZOLE SODIUM 20 MG/1
40 TABLET, DELAYED RELEASE ORAL ONCE
Refills: 0 | Status: COMPLETED | OUTPATIENT
Start: 2019-01-01 | End: 2019-01-01

## 2019-01-01 RX ORDER — NOREPINEPHRINE BITARTRATE/D5W 8 MG/250ML
0.05 PLASTIC BAG, INJECTION (ML) INTRAVENOUS
Qty: 8 | Refills: 0 | Status: DISCONTINUED | OUTPATIENT
Start: 2019-01-01 | End: 2019-01-01

## 2019-01-01 RX ORDER — HALOPERIDOL DECANOATE 100 MG/ML
1 INJECTION INTRAMUSCULAR ONCE
Refills: 0 | Status: COMPLETED | OUTPATIENT
Start: 2019-01-01 | End: 2019-01-01

## 2019-01-01 RX ORDER — VALACYCLOVIR 500 MG/1
1000 TABLET, FILM COATED ORAL THREE TIMES A DAY
Refills: 0 | Status: DISCONTINUED | OUTPATIENT
Start: 2019-01-01 | End: 2019-01-01

## 2019-01-01 RX ORDER — PREDNISOLONE SODIUM PHOSPHATE 1 %
1 DROPS OPHTHALMIC (EYE)
Qty: 1 | Refills: 0
Start: 2019-01-01 | End: 2019-01-01

## 2019-01-01 RX ORDER — POTASSIUM CHLORIDE 20 MEQ
20 PACKET (EA) ORAL
Refills: 0 | Status: COMPLETED | OUTPATIENT
Start: 2019-01-01 | End: 2019-01-01

## 2019-01-01 RX ORDER — AMLODIPINE BESYLATE 2.5 MG/1
1 TABLET ORAL
Qty: 30 | Refills: 0
Start: 2019-01-01 | End: 2019-12-09

## 2019-01-01 RX ORDER — SODIUM CHLORIDE 9 MG/ML
500 INJECTION INTRAMUSCULAR; INTRAVENOUS; SUBCUTANEOUS
Refills: 0 | Status: COMPLETED | OUTPATIENT
Start: 2019-01-01 | End: 2019-01-01

## 2019-01-01 RX ORDER — ONDANSETRON 8 MG/1
4 TABLET, FILM COATED ORAL EVERY 4 HOURS
Refills: 0 | Status: DISCONTINUED | OUTPATIENT
Start: 2019-01-01 | End: 2019-01-01

## 2019-01-01 RX ORDER — I.V. FAT EMULSION 20 G/100ML
0.9 EMULSION INTRAVENOUS
Qty: 50 | Refills: 0 | Status: DISCONTINUED | OUTPATIENT
Start: 2019-01-01 | End: 2019-01-01

## 2019-01-01 RX ORDER — HYDROMORPHONE HYDROCHLORIDE 2 MG/ML
0.5 INJECTION INTRAMUSCULAR; INTRAVENOUS; SUBCUTANEOUS
Refills: 0 | Status: DISCONTINUED | OUTPATIENT
Start: 2019-01-01 | End: 2019-01-01

## 2019-01-01 RX ORDER — MORPHINE SULFATE 50 MG/1
4 CAPSULE, EXTENDED RELEASE ORAL EVERY 4 HOURS
Refills: 0 | Status: DISCONTINUED | OUTPATIENT
Start: 2019-01-01 | End: 2019-01-01

## 2019-01-01 RX ORDER — BACLOFEN 100 %
20 POWDER (GRAM) MISCELLANEOUS THREE TIMES A DAY
Refills: 0 | Status: DISCONTINUED | OUTPATIENT
Start: 2019-01-01 | End: 2019-01-01

## 2019-01-01 RX ORDER — OXYCODONE AND ACETAMINOPHEN 5; 325 MG/1; MG/1
1 TABLET ORAL ONCE
Refills: 0 | Status: DISCONTINUED | OUTPATIENT
Start: 2019-01-01 | End: 2019-01-01

## 2019-01-01 RX ORDER — LIDOCAINE 4 G/100G
1 CREAM TOPICAL
Qty: 30 | Refills: 0
Start: 2019-01-01 | End: 2019-01-01

## 2019-01-01 RX ORDER — LIDOCAINE 4 G/100G
1 CREAM TOPICAL DAILY
Refills: 0 | Status: DISCONTINUED | OUTPATIENT
Start: 2019-01-01 | End: 2019-01-01

## 2019-01-01 RX ORDER — ZOLPIDEM TARTRATE 10 MG/1
5 TABLET ORAL ONCE
Refills: 0 | Status: DISCONTINUED | OUTPATIENT
Start: 2019-01-01 | End: 2019-01-01

## 2019-01-01 RX ORDER — ONDANSETRON 8 MG/1
4 TABLET, FILM COATED ORAL
Qty: 0 | Refills: 0 | DISCHARGE
Start: 2019-01-01

## 2019-01-01 RX ORDER — CEPHALEXIN 500 MG
1 CAPSULE ORAL
Qty: 14 | Refills: 0
Start: 2019-01-01 | End: 2019-01-01

## 2019-01-01 RX ORDER — CHLORHEXIDINE GLUCONATE 213 G/1000ML
1 SOLUTION TOPICAL
Refills: 0 | Status: DISCONTINUED | OUTPATIENT
Start: 2019-01-01 | End: 2019-01-01

## 2019-01-01 RX ORDER — HYDROMORPHONE HYDROCHLORIDE 2 MG/ML
0.5 INJECTION INTRAMUSCULAR; INTRAVENOUS; SUBCUTANEOUS EVERY 4 HOURS
Refills: 0 | Status: DISCONTINUED | OUTPATIENT
Start: 2019-01-01 | End: 2019-01-01

## 2019-01-01 RX ORDER — LIDOCAINE 4 G/100G
1 CREAM TOPICAL
Qty: 0 | Refills: 0 | DISCHARGE
Start: 2019-01-01

## 2019-01-01 RX ORDER — BACITRACIN ZINC 500 UNIT/G
1 OINTMENT IN PACKET (EA) TOPICAL
Refills: 0 | Status: DISCONTINUED | OUTPATIENT
Start: 2019-01-01 | End: 2019-01-01

## 2019-01-01 RX ORDER — SODIUM CHLORIDE 9 MG/ML
500 INJECTION, SOLUTION INTRAVENOUS
Refills: 0 | Status: DISCONTINUED | OUTPATIENT
Start: 2019-01-01 | End: 2019-01-01

## 2019-01-01 RX ORDER — INSULIN HUMAN 100 [IU]/ML
10 INJECTION, SOLUTION SUBCUTANEOUS ONCE
Refills: 0 | Status: COMPLETED | OUTPATIENT
Start: 2019-01-01 | End: 2019-01-01

## 2019-01-01 RX ORDER — ZOLPIDEM TARTRATE 5 MG/1
5 TABLET ORAL
Qty: 30 | Refills: 0 | Status: ACTIVE | COMMUNITY
Start: 2018-01-25 | End: 1900-01-01

## 2019-01-01 RX ORDER — CEFTRIAXONE 500 MG/1
1000 INJECTION, POWDER, FOR SOLUTION INTRAMUSCULAR; INTRAVENOUS ONCE
Refills: 0 | Status: COMPLETED | OUTPATIENT
Start: 2019-01-01 | End: 2019-01-01

## 2019-01-01 RX ORDER — PREDNISOLONE SODIUM PHOSPHATE 1 %
1 DROPS OPHTHALMIC (EYE) DAILY
Refills: 0 | Status: DISCONTINUED | OUTPATIENT
Start: 2019-01-01 | End: 2019-01-01

## 2019-01-01 RX ORDER — OFLOXACIN 0.3 %
1 DROPS OPHTHALMIC (EYE)
Qty: 1 | Refills: 0
Start: 2019-01-01

## 2019-01-01 RX ORDER — ASCORBIC ACID 60 MG
500 TABLET,CHEWABLE ORAL DAILY
Refills: 0 | Status: DISCONTINUED | OUTPATIENT
Start: 2019-01-01 | End: 2019-01-01

## 2019-01-01 RX ORDER — CEFTRIAXONE 500 MG/1
1000 INJECTION, POWDER, FOR SOLUTION INTRAMUSCULAR; INTRAVENOUS EVERY 24 HOURS
Refills: 0 | Status: COMPLETED | OUTPATIENT
Start: 2019-01-01 | End: 2019-01-01

## 2019-01-01 RX ORDER — CEFEPIME 1 G/1
1000 INJECTION, POWDER, FOR SOLUTION INTRAMUSCULAR; INTRAVENOUS ONCE
Refills: 0 | Status: DISCONTINUED | OUTPATIENT
Start: 2019-01-01 | End: 2019-01-01

## 2019-01-01 RX ORDER — MIDAZOLAM HYDROCHLORIDE 1 MG/ML
4 INJECTION, SOLUTION INTRAMUSCULAR; INTRAVENOUS ONCE
Refills: 0 | Status: DISCONTINUED | OUTPATIENT
Start: 2019-01-01 | End: 2019-01-01

## 2019-01-01 RX ORDER — ONDANSETRON 8 MG/1
4 TABLET, FILM COATED ORAL ONCE
Refills: 0 | Status: DISCONTINUED | OUTPATIENT
Start: 2019-01-01 | End: 2019-01-01

## 2019-01-01 RX ORDER — PANTOPRAZOLE SODIUM 20 MG/1
1 TABLET, DELAYED RELEASE ORAL
Qty: 0 | Refills: 0 | DISCHARGE
Start: 2019-01-01

## 2019-01-01 RX ORDER — NALOXONE HYDROCHLORIDE 4 MG/.1ML
0.4 SPRAY NASAL ONCE
Refills: 0 | Status: COMPLETED | OUTPATIENT
Start: 2019-01-01 | End: 2019-01-01

## 2019-01-01 RX ORDER — ATORVASTATIN CALCIUM 80 MG/1
1 TABLET, FILM COATED ORAL
Qty: 0 | Refills: 0 | DISCHARGE
Start: 2019-01-01

## 2019-01-01 RX ORDER — LABETALOL HCL 100 MG
1 TABLET ORAL
Qty: 0 | Refills: 0 | DISCHARGE

## 2019-01-01 RX ORDER — HYDROMORPHONE HYDROCHLORIDE 2 MG/ML
1 INJECTION INTRAMUSCULAR; INTRAVENOUS; SUBCUTANEOUS EVERY 6 HOURS
Refills: 0 | Status: DISCONTINUED | OUTPATIENT
Start: 2019-01-01 | End: 2019-01-01

## 2019-01-01 RX ORDER — CEFTRIAXONE 500 MG/1
1000 INJECTION, POWDER, FOR SOLUTION INTRAMUSCULAR; INTRAVENOUS EVERY 24 HOURS
Refills: 0 | Status: DISCONTINUED | OUTPATIENT
Start: 2019-01-01 | End: 2019-01-01

## 2019-01-01 RX ORDER — OFLOXACIN 0.3 %
1 DROPS OPHTHALMIC (EYE)
Qty: 0 | Refills: 0 | DISCHARGE
Start: 2019-01-01

## 2019-01-01 RX ORDER — POTASSIUM CHLORIDE 20 MEQ
20 PACKET (EA) ORAL
Refills: 0 | Status: DISCONTINUED | OUTPATIENT
Start: 2019-01-01 | End: 2019-01-01

## 2019-01-01 RX ORDER — DIPHENHYDRAMINE HCL 50 MG
25 CAPSULE ORAL ONCE
Refills: 0 | Status: COMPLETED | OUTPATIENT
Start: 2019-01-01 | End: 2019-01-01

## 2019-01-01 RX ORDER — SODIUM CHLORIDE 9 MG/ML
1550 INJECTION INTRAMUSCULAR; INTRAVENOUS; SUBCUTANEOUS ONCE
Refills: 0 | Status: COMPLETED | OUTPATIENT
Start: 2019-01-01 | End: 2019-01-01

## 2019-01-01 RX ORDER — DULOXETINE HYDROCHLORIDE 30 MG/1
20 CAPSULE, DELAYED RELEASE ORAL DAILY
Refills: 0 | Status: DISCONTINUED | OUTPATIENT
Start: 2019-01-01 | End: 2019-01-01

## 2019-01-01 RX ORDER — OXYCODONE AND ACETAMINOPHEN 5; 325 MG/1; MG/1
2 TABLET ORAL EVERY 6 HOURS
Refills: 0 | Status: DISCONTINUED | OUTPATIENT
Start: 2019-01-01 | End: 2019-01-01

## 2019-01-01 RX ORDER — MAGNESIUM SULFATE 500 MG/ML
1 VIAL (ML) INJECTION ONCE
Refills: 0 | Status: COMPLETED | OUTPATIENT
Start: 2019-01-01 | End: 2019-01-01

## 2019-01-01 RX ORDER — ACETAMINOPHEN 500 MG
975 TABLET ORAL ONCE
Refills: 0 | Status: COMPLETED | OUTPATIENT
Start: 2019-01-01 | End: 2019-01-01

## 2019-01-01 RX ORDER — PREDNISOLONE SODIUM PHOSPHATE 1 %
1 DROPS OPHTHALMIC (EYE)
Refills: 0 | Status: DISCONTINUED | OUTPATIENT
Start: 2019-01-01 | End: 2019-01-01

## 2019-01-01 RX ORDER — CARBAMIDE PEROXIDE 81.86 MG/ML
10 SOLUTION/ DROPS AURICULAR (OTIC)
Refills: 0 | Status: DISCONTINUED | OUTPATIENT
Start: 2019-01-01 | End: 2019-01-01

## 2019-01-01 RX ORDER — SODIUM CHLORIDE 9 MG/ML
3 INJECTION INTRAMUSCULAR; INTRAVENOUS; SUBCUTANEOUS EVERY 8 HOURS
Refills: 0 | Status: DISCONTINUED | OUTPATIENT
Start: 2019-01-01 | End: 2019-01-01

## 2019-01-01 RX ORDER — PREDNISOLONE ACETATE 10 MG/ML
1 SUSPENSION/ DROPS OPHTHALMIC
Qty: 1 | Refills: 3 | Status: ACTIVE | COMMUNITY
Start: 2019-01-01 | End: 1900-01-01

## 2019-01-01 RX ORDER — LABETALOL HCL 100 MG
200 TABLET ORAL EVERY 8 HOURS
Refills: 0 | Status: DISCONTINUED | OUTPATIENT
Start: 2019-01-01 | End: 2019-01-01

## 2019-01-01 RX ORDER — GABAPENTIN 400 MG/1
800 CAPSULE ORAL EVERY 8 HOURS
Refills: 0 | Status: DISCONTINUED | OUTPATIENT
Start: 2019-01-01 | End: 2019-01-01

## 2019-01-01 RX ORDER — OFLOXACIN 0.3 %
2 DROPS OPHTHALMIC (EYE)
Refills: 0 | Status: DISCONTINUED | OUTPATIENT
Start: 2019-01-01 | End: 2019-01-01

## 2019-01-01 RX ORDER — MIDAZOLAM HYDROCHLORIDE 1 MG/ML
2 INJECTION, SOLUTION INTRAMUSCULAR; INTRAVENOUS ONCE
Refills: 0 | Status: DISCONTINUED | OUTPATIENT
Start: 2019-01-01 | End: 2019-01-01

## 2019-01-01 RX ORDER — SODIUM CHLORIDE 9 MG/ML
1000 INJECTION, SOLUTION INTRAVENOUS
Qty: 0 | Refills: 0 | Status: DISCONTINUED | OUTPATIENT
Start: 2019-01-01 | End: 2019-01-01

## 2019-01-01 RX ORDER — CALCIUM GLUCONATE 100 MG/ML
2 VIAL (ML) INTRAVENOUS ONCE
Refills: 0 | Status: COMPLETED | OUTPATIENT
Start: 2019-01-01 | End: 2019-01-01

## 2019-01-01 RX ORDER — FENTANYL CITRATE 50 UG/ML
50 INJECTION INTRAVENOUS ONCE
Refills: 0 | Status: DISCONTINUED | OUTPATIENT
Start: 2019-01-01 | End: 2019-01-01

## 2019-01-01 RX ORDER — BACLOFEN 100 %
20 POWDER (GRAM) MISCELLANEOUS THREE TIMES A DAY
Qty: 0 | Refills: 0 | Status: DISCONTINUED | OUTPATIENT
Start: 2019-01-01 | End: 2019-01-01

## 2019-01-01 RX ORDER — PROPOFOL 10 MG/ML
20 INJECTION, EMULSION INTRAVENOUS
Qty: 500 | Refills: 0 | Status: DISCONTINUED | OUTPATIENT
Start: 2019-01-01 | End: 2019-01-01

## 2019-01-01 RX ORDER — MIDAZOLAM HYDROCHLORIDE 1 MG/ML
2 INJECTION, SOLUTION INTRAMUSCULAR; INTRAVENOUS
Refills: 0 | Status: DISCONTINUED | OUTPATIENT
Start: 2019-01-01 | End: 2019-01-01

## 2019-01-01 RX ORDER — LABETALOL HCL 100 MG
1 TABLET ORAL
Qty: 0 | Refills: 0 | DISCHARGE
Start: 2019-01-01

## 2019-01-01 RX ORDER — OCTREOTIDE ACETATE 200 UG/ML
50 INJECTION, SOLUTION INTRAVENOUS; SUBCUTANEOUS
Qty: 500 | Refills: 0 | Status: DISCONTINUED | OUTPATIENT
Start: 2019-01-01 | End: 2019-01-01

## 2019-01-01 RX ORDER — LABETALOL HCL 100 MG
1 TABLET ORAL
Qty: 60 | Refills: 0
Start: 2019-01-01 | End: 2019-12-09

## 2019-01-01 RX ORDER — SOD SULF/SODIUM/NAHCO3/KCL/PEG
4000 SOLUTION, RECONSTITUTED, ORAL ORAL ONCE
Refills: 0 | Status: COMPLETED | OUTPATIENT
Start: 2019-01-01 | End: 2019-01-01

## 2019-01-01 RX ORDER — HYDRALAZINE HCL 50 MG
10 TABLET ORAL ONCE
Refills: 0 | Status: COMPLETED | OUTPATIENT
Start: 2019-01-01 | End: 2019-01-01

## 2019-01-01 RX ORDER — LABETALOL HCL 100 MG
1 TABLET ORAL
Qty: 60 | Refills: 0
Start: 2019-01-01 | End: 2019-01-01

## 2019-01-01 RX ORDER — DEXTROSE 50 % IN WATER 50 %
50 SYRINGE (ML) INTRAVENOUS ONCE
Refills: 0 | Status: COMPLETED | OUTPATIENT
Start: 2019-01-01 | End: 2019-01-01

## 2019-01-01 RX ORDER — GABAPENTIN 400 MG/1
1 CAPSULE ORAL
Qty: 90 | Refills: 0
Start: 2019-01-01 | End: 2019-01-01

## 2019-01-01 RX ORDER — MULTIVIT WITH MIN/MFOLATE/K2 340-15/3 G
1 POWDER (GRAM) ORAL ONCE
Refills: 0 | Status: COMPLETED | OUTPATIENT
Start: 2019-01-01 | End: 2019-01-01

## 2019-01-01 RX ORDER — METOCLOPRAMIDE HCL 10 MG
10 TABLET ORAL ONCE
Refills: 0 | Status: COMPLETED | OUTPATIENT
Start: 2019-01-01 | End: 2019-01-01

## 2019-01-01 RX ORDER — MEROPENEM 1 G/30ML
500 INJECTION INTRAVENOUS ONCE
Refills: 0 | Status: COMPLETED | OUTPATIENT
Start: 2019-01-01 | End: 2019-01-01

## 2019-01-01 RX ORDER — ALBUMIN HUMAN 25 %
250 VIAL (ML) INTRAVENOUS ONCE
Refills: 0 | Status: COMPLETED | OUTPATIENT
Start: 2019-01-01 | End: 2019-01-01

## 2019-01-01 RX ORDER — ACYCLOVIR SODIUM 500 MG
250 VIAL (EA) INTRAVENOUS EVERY 12 HOURS
Refills: 0 | Status: DISCONTINUED | OUTPATIENT
Start: 2019-01-01 | End: 2019-01-01

## 2019-01-01 RX ORDER — ACETAMINOPHEN 500 MG
2 TABLET ORAL
Qty: 0 | Refills: 0 | DISCHARGE
Start: 2019-01-01

## 2019-01-01 RX ORDER — ACETAMINOPHEN 500 MG
1000 TABLET ORAL ONCE
Refills: 0 | Status: COMPLETED | OUTPATIENT
Start: 2019-01-01 | End: 2019-01-01

## 2019-01-01 RX ORDER — MEROPENEM 1 G/30ML
1000 INJECTION INTRAVENOUS ONCE
Refills: 0 | Status: COMPLETED | OUTPATIENT
Start: 2019-01-01 | End: 2019-01-01

## 2019-01-01 RX ORDER — OXYCODONE HYDROCHLORIDE 5 MG/1
5 TABLET ORAL
Refills: 0 | Status: DISCONTINUED | OUTPATIENT
Start: 2019-01-01 | End: 2019-01-01

## 2019-01-01 RX ORDER — OXYCODONE HYDROCHLORIDE 5 MG/1
0 TABLET ORAL
Qty: 0 | Refills: 0 | DISCHARGE

## 2019-01-01 RX ORDER — DULOXETINE HYDROCHLORIDE 30 MG/1
20 CAPSULE, DELAYED RELEASE ORAL
Qty: 0 | Refills: 0 | Status: DISCONTINUED | OUTPATIENT
Start: 2019-01-01 | End: 2019-01-01

## 2019-01-01 RX ORDER — POTASSIUM CHLORIDE 20 MEQ
40 PACKET (EA) ORAL EVERY 4 HOURS
Refills: 0 | Status: COMPLETED | OUTPATIENT
Start: 2019-01-01 | End: 2019-01-01

## 2019-01-01 RX ORDER — PROPOFOL 10 MG/ML
40 INJECTION, EMULSION INTRAVENOUS
Qty: 500 | Refills: 0 | Status: DISCONTINUED | OUTPATIENT
Start: 2019-01-01 | End: 2019-01-01

## 2019-01-01 RX ORDER — SOD SULF/SODIUM/NAHCO3/KCL/PEG
4000 SOLUTION, RECONSTITUTED, ORAL ORAL ONCE
Refills: 0 | Status: DISCONTINUED | OUTPATIENT
Start: 2019-01-01 | End: 2019-01-01

## 2019-01-01 RX ORDER — LANOLIN ALCOHOL/MO/W.PET/CERES
3 CREAM (GRAM) TOPICAL AT BEDTIME
Refills: 0 | Status: DISCONTINUED | OUTPATIENT
Start: 2019-01-01 | End: 2019-01-01

## 2019-01-01 RX ORDER — LEVORPHANOL TARTRATE 3 MG/1
2 TABLET ORAL THREE TIMES A DAY
Qty: 0 | Refills: 0 | Status: COMPLETED | OUTPATIENT
Start: 2019-01-01 | End: 2019-01-01

## 2019-01-01 RX ORDER — LABETALOL HCL 100 MG
200 TABLET ORAL THREE TIMES A DAY
Qty: 0 | Refills: 0 | Status: DISCONTINUED | OUTPATIENT
Start: 2019-01-01 | End: 2019-01-01

## 2019-01-01 RX ORDER — CEPHALEXIN 500 MG
1 CAPSULE ORAL
Qty: 10 | Refills: 0
Start: 2019-01-01 | End: 2019-01-01

## 2019-01-01 RX ORDER — FENTANYL CITRATE 50 UG/ML
50 INJECTION INTRAVENOUS
Refills: 0 | Status: DISCONTINUED | OUTPATIENT
Start: 2019-01-01 | End: 2019-01-01

## 2019-01-01 RX ORDER — MAGNESIUM OXIDE 400 MG ORAL TABLET 241.3 MG
400 TABLET ORAL ONCE
Refills: 0 | Status: COMPLETED | OUTPATIENT
Start: 2019-01-01 | End: 2019-01-01

## 2019-01-01 RX ORDER — SODIUM CHLORIDE 9 MG/ML
1000 INJECTION INTRAMUSCULAR; INTRAVENOUS; SUBCUTANEOUS
Refills: 0 | Status: DISCONTINUED | OUTPATIENT
Start: 2019-01-01 | End: 2019-01-01

## 2019-01-01 RX ORDER — BACLOFEN 100 %
20 POWDER (GRAM) MISCELLANEOUS
Qty: 0 | Refills: 0 | DISCHARGE

## 2019-01-01 RX ORDER — CHOLECALCIFEROL (VITAMIN D3) 125 MCG
1 CAPSULE ORAL
Qty: 0 | Refills: 0 | DISCHARGE

## 2019-01-01 RX ORDER — DOCUSATE SODIUM 100 MG
1 CAPSULE ORAL
Qty: 0 | Refills: 0 | DISCHARGE
Start: 2019-01-01

## 2019-01-01 RX ORDER — ZOLPIDEM TARTRATE 10 MG/1
5 TABLET ORAL AT BEDTIME
Qty: 0 | Refills: 0 | Status: DISCONTINUED | OUTPATIENT
Start: 2019-01-01 | End: 2019-01-01

## 2019-01-01 RX ORDER — LABETALOL HCL 100 MG
400 TABLET ORAL EVERY 8 HOURS
Refills: 0 | Status: DISCONTINUED | OUTPATIENT
Start: 2019-01-01 | End: 2019-01-01

## 2019-01-01 RX ORDER — GABAPENTIN 400 MG/1
1 CAPSULE ORAL
Qty: 90 | Refills: 0
Start: 2019-01-01 | End: 2019-12-09

## 2019-01-01 RX ORDER — AMLODIPINE BESYLATE 2.5 MG/1
5 TABLET ORAL ONCE
Refills: 0 | Status: COMPLETED | OUTPATIENT
Start: 2019-01-01 | End: 2019-01-01

## 2019-01-01 RX ORDER — OXYCODONE AND ACETAMINOPHEN 5; 325 MG/1; MG/1
1 TABLET ORAL EVERY 6 HOURS
Refills: 0 | Status: DISCONTINUED | OUTPATIENT
Start: 2019-01-01 | End: 2019-01-01

## 2019-01-01 RX ORDER — DULOXETINE HYDROCHLORIDE 30 MG/1
30 CAPSULE, DELAYED RELEASE ORAL DAILY
Refills: 0 | Status: DISCONTINUED | OUTPATIENT
Start: 2019-01-01 | End: 2019-01-01

## 2019-01-01 RX ORDER — BACLOFEN 20 MG/1
20 TABLET ORAL 3 TIMES DAILY
Qty: 60 | Refills: 1 | Status: ACTIVE | COMMUNITY
Start: 2017-10-20 | End: 1900-01-01

## 2019-01-01 RX ORDER — ACETAMINOPHEN 500 MG
1000 TABLET ORAL ONCE
Qty: 0 | Refills: 0 | Status: DISCONTINUED | OUTPATIENT
Start: 2019-01-01 | End: 2019-01-01

## 2019-01-01 RX ORDER — OXYCODONE HYDROCHLORIDE 5 MG/1
10 TABLET ORAL
Refills: 0 | Status: DISCONTINUED | OUTPATIENT
Start: 2019-01-01 | End: 2019-01-01

## 2019-01-01 RX ORDER — DEXTROSE 50 % IN WATER 50 %
12.5 SYRINGE (ML) INTRAVENOUS ONCE
Refills: 0 | Status: DISCONTINUED | OUTPATIENT
Start: 2019-01-01 | End: 2019-01-01

## 2019-01-01 RX ORDER — OFLOXACIN 0.3 %
1 DROPS OPHTHALMIC (EYE)
Refills: 0 | Status: DISCONTINUED | OUTPATIENT
Start: 2019-01-01 | End: 2019-01-01

## 2019-01-01 RX ORDER — LIDOCAINE 4 G/100G
1 CREAM TOPICAL EVERY 24 HOURS
Refills: 0 | Status: DISCONTINUED | OUTPATIENT
Start: 2019-01-01 | End: 2019-01-01

## 2019-01-01 RX ORDER — BACLOFEN 100 %
20 POWDER (GRAM) MISCELLANEOUS ONCE
Refills: 0 | Status: COMPLETED | OUTPATIENT
Start: 2019-01-01 | End: 2019-01-01

## 2019-01-01 RX ORDER — CEFTRIAXONE 500 MG/1
INJECTION, POWDER, FOR SOLUTION INTRAMUSCULAR; INTRAVENOUS
Refills: 0 | Status: DISCONTINUED | OUTPATIENT
Start: 2019-01-01 | End: 2019-01-01

## 2019-01-01 RX ORDER — SODIUM CHLORIDE 5 %
1 DROPS OPHTHALMIC (EYE) THREE TIMES A DAY
Refills: 0 | Status: DISCONTINUED | OUTPATIENT
Start: 2019-01-01 | End: 2019-01-01

## 2019-01-01 RX ORDER — ATORVASTATIN CALCIUM 80 MG/1
1 TABLET, FILM COATED ORAL
Qty: 0 | Refills: 0 | DISCHARGE

## 2019-01-01 RX ORDER — DULOXETINE HYDROCHLORIDE 20 MG/1
20 CAPSULE, DELAYED RELEASE PELLETS ORAL DAILY
Qty: 60 | Refills: 5 | Status: ACTIVE | COMMUNITY
Start: 2018-01-09 | End: 1900-01-01

## 2019-01-01 RX ORDER — DULOXETINE HYDROCHLORIDE 30 MG/1
30 CAPSULE, DELAYED RELEASE ORAL EVERY 12 HOURS
Refills: 0 | Status: DISCONTINUED | OUTPATIENT
Start: 2019-01-01 | End: 2019-01-01

## 2019-01-01 RX ORDER — PREDNISOLONE SODIUM PHOSPHATE 1 %
1 DROPS OPHTHALMIC (EYE)
Qty: 0 | Refills: 0 | Status: DISCONTINUED | OUTPATIENT
Start: 2019-01-01 | End: 2019-01-01

## 2019-01-01 RX ORDER — POLYETHYLENE GLYCOL 3350 17 G/17G
17 POWDER, FOR SOLUTION ORAL
Qty: 0 | Refills: 0 | DISCHARGE
Start: 2019-01-01

## 2019-01-01 RX ORDER — SODIUM CHLORIDE 9 MG/ML
2000 INJECTION INTRAMUSCULAR; INTRAVENOUS; SUBCUTANEOUS ONCE
Refills: 0 | Status: COMPLETED | OUTPATIENT
Start: 2019-01-01 | End: 2019-01-01

## 2019-01-01 RX ORDER — NEOMYCIN SULFATE, POLYMYXIN B SULFATE AND DEXAMETHASONE 3.5; 10000; 1 MG/ML; [USP'U]/ML; MG/ML
3.5-10000-0.1 SUSPENSION OPHTHALMIC TWICE DAILY
Qty: 1 | Refills: 3 | Status: ACTIVE | COMMUNITY
Start: 2018-06-07 | End: 1900-01-01

## 2019-01-01 RX ORDER — PANTOPRAZOLE SODIUM 20 MG/1
1 TABLET, DELAYED RELEASE ORAL
Qty: 30 | Refills: 0
Start: 2019-01-01 | End: 2019-12-05

## 2019-01-01 RX ORDER — GABAPENTIN 400 MG/1
1 CAPSULE ORAL
Qty: 0 | Refills: 0 | DISCHARGE

## 2019-01-01 RX ORDER — ERYTHROMYCIN BASE 5 MG/GRAM
1 OINTMENT (GRAM) OPHTHALMIC (EYE)
Refills: 0 | Status: DISCONTINUED | OUTPATIENT
Start: 2019-01-01 | End: 2019-01-01

## 2019-01-01 RX ORDER — VANCOMYCIN HCL 1 G
750 VIAL (EA) INTRAVENOUS ONCE
Refills: 0 | Status: COMPLETED | OUTPATIENT
Start: 2019-01-01 | End: 2019-01-01

## 2019-01-01 RX ORDER — LABETALOL HCL 100 MG
200 TABLET ORAL EVERY 12 HOURS
Refills: 0 | Status: DISCONTINUED | OUTPATIENT
Start: 2019-01-01 | End: 2019-01-01

## 2019-01-01 RX ORDER — HYDROMORPHONE HYDROCHLORIDE 2 MG/ML
0.5 INJECTION INTRAMUSCULAR; INTRAVENOUS; SUBCUTANEOUS
Qty: 0 | Refills: 0 | Status: DISCONTINUED | OUTPATIENT
Start: 2019-01-01 | End: 2019-01-01

## 2019-01-01 RX ORDER — DULOXETINE HYDROCHLORIDE 30 MG/1
1 CAPSULE, DELAYED RELEASE ORAL
Qty: 0 | Refills: 0 | DISCHARGE

## 2019-01-01 RX ORDER — HYDROMORPHONE HYDROCHLORIDE 2 MG/ML
0.5 INJECTION INTRAMUSCULAR; INTRAVENOUS; SUBCUTANEOUS EVERY 6 HOURS
Refills: 0 | Status: DISCONTINUED | OUTPATIENT
Start: 2019-01-01 | End: 2019-01-01

## 2019-01-01 RX ORDER — ALPRAZOLAM 0.25 MG
0.5 TABLET ORAL ONCE
Refills: 0 | Status: DISCONTINUED | OUTPATIENT
Start: 2019-01-01 | End: 2019-01-01

## 2019-01-01 RX ORDER — VALACYCLOVIR 1 G/1
1 TABLET, FILM COATED ORAL
Qty: 1 | Refills: 5 | Status: ACTIVE | COMMUNITY
Start: 2019-01-01 | End: 1900-01-01

## 2019-01-01 RX ORDER — OXYCODONE HYDROCHLORIDE 5 MG/1
5 TABLET ORAL ONCE
Qty: 0 | Refills: 0 | Status: DISCONTINUED | OUTPATIENT
Start: 2019-01-01 | End: 2019-01-01

## 2019-01-01 RX ORDER — POLYETHYLENE GLYCOL 3350 17 G/17G
17 POWDER, FOR SOLUTION ORAL
Qty: 0 | Refills: 0 | DISCHARGE

## 2019-01-01 RX ORDER — GABAPENTIN 400 MG/1
600 CAPSULE ORAL EVERY 8 HOURS
Refills: 0 | Status: DISCONTINUED | OUTPATIENT
Start: 2019-01-01 | End: 2019-01-01

## 2019-01-01 RX ORDER — GABAPENTIN 400 MG/1
2 CAPSULE ORAL
Qty: 0 | Refills: 0 | DISCHARGE
Start: 2019-01-01

## 2019-01-01 RX ORDER — VALACYCLOVIR 500 MG/1
1000 TABLET, FILM COATED ORAL
Refills: 0 | Status: DISCONTINUED | OUTPATIENT
Start: 2019-01-01 | End: 2019-01-01

## 2019-01-01 RX ORDER — POTASSIUM PHOSPHATE, MONOBASIC POTASSIUM PHOSPHATE, DIBASIC 236; 224 MG/ML; MG/ML
30 INJECTION, SOLUTION INTRAVENOUS ONCE
Refills: 0 | Status: COMPLETED | OUTPATIENT
Start: 2019-01-01 | End: 2019-01-01

## 2019-01-01 RX ORDER — ALPRAZOLAM 0.25 MG
0.25 TABLET ORAL EVERY 6 HOURS
Refills: 0 | Status: DISCONTINUED | OUTPATIENT
Start: 2019-01-01 | End: 2019-01-01

## 2019-01-01 RX ORDER — I.V. FAT EMULSION 20 G/100ML
0.9 EMULSION INTRAVENOUS
Qty: 49.5 | Refills: 0 | Status: DISCONTINUED | OUTPATIENT
Start: 2019-01-01 | End: 2019-01-01

## 2019-01-01 RX ORDER — CALCIUM CHLORIDE
1000 POWDER (GRAM) MISCELLANEOUS ONCE
Refills: 0 | Status: DISCONTINUED | OUTPATIENT
Start: 2019-01-01 | End: 2019-01-01

## 2019-01-01 RX ORDER — MEROPENEM 1 G/30ML
1000 INJECTION INTRAVENOUS
Qty: 0 | Refills: 0 | DISCHARGE
Start: 2019-01-01

## 2019-01-01 RX ORDER — FENTANYL CITRATE 50 UG/ML
25 INJECTION INTRAVENOUS
Refills: 0 | Status: DISCONTINUED | OUTPATIENT
Start: 2019-01-01 | End: 2019-01-01

## 2019-01-01 RX ORDER — MORPHINE SULFATE 50 MG/1
30 CAPSULE, EXTENDED RELEASE ORAL
Refills: 0 | Status: DISCONTINUED | OUTPATIENT
Start: 2019-01-01 | End: 2019-01-01

## 2019-01-01 RX ORDER — INFLUENZA VIRUS VACCINE 15; 15; 15; 15 UG/.5ML; UG/.5ML; UG/.5ML; UG/.5ML
0.5 SUSPENSION INTRAMUSCULAR ONCE
Refills: 0 | Status: COMPLETED | OUTPATIENT
Start: 2019-01-01 | End: 2019-01-01

## 2019-01-01 RX ORDER — VALACYCLOVIR 500 MG/1
1 TABLET, FILM COATED ORAL
Qty: 0 | Refills: 0 | DISCHARGE

## 2019-01-01 RX ORDER — CEFEPIME 1 G/1
INJECTION, POWDER, FOR SOLUTION INTRAMUSCULAR; INTRAVENOUS
Refills: 0 | Status: DISCONTINUED | OUTPATIENT
Start: 2019-01-01 | End: 2019-01-01

## 2019-01-01 RX ORDER — PSYLLIUM SEED (WITH DEXTROSE)
1 POWDER (GRAM) ORAL ONCE
Refills: 0 | Status: COMPLETED | OUTPATIENT
Start: 2019-01-01 | End: 2019-01-01

## 2019-01-01 RX ORDER — SODIUM CHLORIDE 5 %
1 DROPS OPHTHALMIC (EYE) EVERY 6 HOURS
Refills: 0 | Status: DISCONTINUED | OUTPATIENT
Start: 2019-01-01 | End: 2019-01-01

## 2019-01-01 RX ORDER — MINERAL OIL
133 OIL (ML) MISCELLANEOUS DAILY
Refills: 0 | Status: DISCONTINUED | OUTPATIENT
Start: 2019-01-01 | End: 2019-01-01

## 2019-01-01 RX ORDER — OXYCODONE 10 MG/1
10 TABLET ORAL
Qty: 180 | Refills: 0 | Status: ACTIVE | COMMUNITY
Start: 2018-07-11 | End: 1900-01-01

## 2019-01-01 RX ORDER — POTASSIUM CHLORIDE 20 MEQ
10 PACKET (EA) ORAL ONCE
Refills: 0 | Status: COMPLETED | OUTPATIENT
Start: 2019-01-01 | End: 2019-01-01

## 2019-01-01 RX ORDER — OXYCODONE HYDROCHLORIDE 5 MG/1
1 TABLET ORAL
Qty: 0 | Refills: 0 | DISCHARGE
Start: 2019-01-01

## 2019-01-01 RX ORDER — MIDAZOLAM HYDROCHLORIDE 1 MG/ML
6 INJECTION, SOLUTION INTRAMUSCULAR; INTRAVENOUS ONCE
Refills: 0 | Status: DISCONTINUED | OUTPATIENT
Start: 2019-01-01 | End: 2019-01-01

## 2019-01-01 RX ORDER — POLYETHYLENE GLYCOL 3350 17 G/17G
17 POWDER, FOR SOLUTION ORAL
Refills: 0 | Status: DISCONTINUED | OUTPATIENT
Start: 2019-01-01 | End: 2019-01-01

## 2019-01-01 RX ORDER — I.V. FAT EMULSION 20 G/100ML
0.91 EMULSION INTRAVENOUS
Qty: 50 | Refills: 0 | Status: DISCONTINUED | OUTPATIENT
Start: 2019-01-01 | End: 2019-01-01

## 2019-01-01 RX ORDER — CHLORHEXIDINE GLUCONATE 213 G/1000ML
1 SOLUTION TOPICAL DAILY
Refills: 0 | Status: DISCONTINUED | OUTPATIENT
Start: 2019-01-01 | End: 2019-01-01

## 2019-01-01 RX ORDER — ACETAMINOPHEN 500 MG
650 TABLET ORAL ONCE
Qty: 0 | Refills: 0 | Status: COMPLETED | OUTPATIENT
Start: 2019-01-01 | End: 2019-01-01

## 2019-01-01 RX ORDER — DEXTROSE 50 % IN WATER 50 %
25 SYRINGE (ML) INTRAVENOUS ONCE
Refills: 0 | Status: DISCONTINUED | OUTPATIENT
Start: 2019-01-01 | End: 2019-01-01

## 2019-01-01 RX ORDER — SODIUM CHLORIDE 9 MG/ML
2000 INJECTION, SOLUTION INTRAVENOUS ONCE
Refills: 0 | Status: COMPLETED | OUTPATIENT
Start: 2019-01-01 | End: 2019-01-01

## 2019-01-01 RX ORDER — SODIUM CHLORIDE 5 %
1 DROPS OPHTHALMIC (EYE)
Qty: 0 | Refills: 0 | DISCHARGE
Start: 2019-01-01

## 2019-01-01 RX ORDER — AMPICILLIN SODIUM AND SULBACTAM SODIUM 250; 125 MG/ML; MG/ML
3 INJECTION, POWDER, FOR SUSPENSION INTRAMUSCULAR; INTRAVENOUS EVERY 6 HOURS
Refills: 0 | Status: DISCONTINUED | OUTPATIENT
Start: 2019-01-01 | End: 2019-01-01

## 2019-01-01 RX ORDER — ONABOTULINUMTOXINA 100 [USP'U]/1
100 INJECTION, POWDER, LYOPHILIZED, FOR SOLUTION INTRADERMAL; INTRAMUSCULAR
Qty: 3 | Refills: 0 | Status: ACTIVE | OUTPATIENT
Start: 2019-01-01

## 2019-01-01 RX ORDER — PANTOPRAZOLE SODIUM 20 MG/1
40 TABLET, DELAYED RELEASE ORAL DAILY
Refills: 0 | Status: DISCONTINUED | OUTPATIENT
Start: 2019-01-01 | End: 2019-01-01

## 2019-01-01 RX ORDER — METOPROLOL TARTRATE 50 MG
2.5 TABLET ORAL ONCE
Refills: 0 | Status: DISCONTINUED | OUTPATIENT
Start: 2019-01-01 | End: 2019-01-01

## 2019-01-01 RX ORDER — LOPERAMIDE HCL 2 MG
2 TABLET ORAL EVERY 4 HOURS
Refills: 0 | Status: DISCONTINUED | OUTPATIENT
Start: 2019-01-01 | End: 2019-01-01

## 2019-01-01 RX ORDER — DICLOFENAC SODIUM 30 MG/G
1 GEL TOPICAL
Qty: 1 | Refills: 0
Start: 2019-01-01 | End: 2019-12-09

## 2019-01-01 RX ORDER — AMLODIPINE BESYLATE 2.5 MG/1
1 TABLET ORAL
Qty: 0 | Refills: 0 | DISCHARGE
Start: 2019-01-01

## 2019-01-01 RX ORDER — FENTANYL CITRATE 50 UG/ML
25 INJECTION INTRAVENOUS EVERY 4 HOURS
Refills: 0 | Status: DISCONTINUED | OUTPATIENT
Start: 2019-01-01 | End: 2019-01-01

## 2019-01-01 RX ORDER — LEVORPHANOL TARTRATE 3 MG/1
1 TABLET ORAL
Qty: 0 | Refills: 0 | DISCHARGE

## 2019-01-01 RX ORDER — ONDANSETRON 8 MG/1
4 TABLET, FILM COATED ORAL ONCE
Qty: 0 | Refills: 0 | Status: DISCONTINUED | OUTPATIENT
Start: 2019-01-01 | End: 2019-01-01

## 2019-01-01 RX ORDER — LIDOCAINE 4 G/100G
0 CREAM TOPICAL
Qty: 0 | Refills: 0 | DISCHARGE
Start: 2019-01-01

## 2019-01-01 RX ORDER — DULOXETINE HYDROCHLORIDE 30 MG/1
1 CAPSULE, DELAYED RELEASE ORAL
Qty: 60 | Refills: 0
Start: 2019-01-01 | End: 2019-01-01

## 2019-01-01 RX ORDER — SODIUM CHLORIDE 9 MG/ML
1000 INJECTION, SOLUTION INTRAVENOUS ONCE
Refills: 0 | Status: DISCONTINUED | OUTPATIENT
Start: 2019-01-01 | End: 2019-01-01

## 2019-01-01 RX ORDER — LACTULOSE 10 G/15ML
30 SOLUTION ORAL ONCE
Refills: 0 | Status: COMPLETED | OUTPATIENT
Start: 2019-01-01 | End: 2019-01-01

## 2019-01-01 RX ORDER — OXYCODONE HYDROCHLORIDE 5 MG/1
5 TABLET ORAL ONCE
Refills: 0 | Status: DISCONTINUED | OUTPATIENT
Start: 2019-01-01 | End: 2019-01-01

## 2019-01-01 RX ORDER — OXYCODONE HYDROCHLORIDE 5 MG/1
10 TABLET ORAL EVERY 4 HOURS
Qty: 0 | Refills: 0 | Status: DISCONTINUED | OUTPATIENT
Start: 2019-01-01 | End: 2019-01-01

## 2019-01-01 RX ORDER — FENTANYL CITRATE 50 UG/ML
100 INJECTION INTRAVENOUS ONCE
Refills: 0 | Status: DISCONTINUED | OUTPATIENT
Start: 2019-01-01 | End: 2019-01-01

## 2019-01-01 RX ORDER — PREGABALIN 225 MG/1
0 CAPSULE ORAL
Qty: 0 | Refills: 0 | DISCHARGE

## 2019-01-01 RX ORDER — VASOPRESSIN 20 [USP'U]/ML
0.04 INJECTION INTRAVENOUS
Qty: 50 | Refills: 0 | Status: DISCONTINUED | OUTPATIENT
Start: 2019-01-01 | End: 2019-01-01

## 2019-01-01 RX ORDER — POLYETHYLENE GLYCOL 3350 17 G/17G
1 POWDER, FOR SOLUTION ORAL
Qty: 0 | Refills: 0 | DISCHARGE

## 2019-01-01 RX ORDER — SODIUM CHLORIDE 9 MG/ML
1000 INJECTION, SOLUTION INTRAVENOUS
Refills: 0 | Status: COMPLETED | OUTPATIENT
Start: 2019-01-01 | End: 2019-01-01

## 2019-01-01 RX ORDER — DEXMEDETOMIDINE HYDROCHLORIDE IN 0.9% SODIUM CHLORIDE 4 UG/ML
0.05 INJECTION INTRAVENOUS
Qty: 200 | Refills: 0 | Status: DISCONTINUED | OUTPATIENT
Start: 2019-01-01 | End: 2019-01-01

## 2019-01-01 RX ORDER — LISINOPRIL 2.5 MG/1
10 TABLET ORAL DAILY
Refills: 0 | Status: DISCONTINUED | OUTPATIENT
Start: 2019-01-01 | End: 2019-01-01

## 2019-01-01 RX ORDER — SODIUM,POTASSIUM PHOSPHATES 278-250MG
1 POWDER IN PACKET (EA) ORAL ONCE
Refills: 0 | Status: COMPLETED | OUTPATIENT
Start: 2019-01-01 | End: 2019-01-01

## 2019-01-01 RX ORDER — ACETAMINOPHEN 500 MG
1 TABLET ORAL
Qty: 0 | Refills: 0 | DISCHARGE

## 2019-01-01 RX ORDER — CEFAZOLIN SODIUM 1 G
1000 VIAL (EA) INJECTION EVERY 8 HOURS
Refills: 0 | Status: COMPLETED | OUTPATIENT
Start: 2019-01-01 | End: 2019-01-01

## 2019-01-01 RX ORDER — ACETAMINOPHEN 500 MG
500 TABLET ORAL EVERY 6 HOURS
Refills: 0 | Status: DISCONTINUED | OUTPATIENT
Start: 2019-01-01 | End: 2019-01-01

## 2019-01-01 RX ORDER — DULOXETINE HYDROCHLORIDE 30 MG/1
20 CAPSULE, DELAYED RELEASE ORAL EVERY 12 HOURS
Refills: 0 | Status: DISCONTINUED | OUTPATIENT
Start: 2019-01-01 | End: 2019-01-01

## 2019-01-01 RX ORDER — MEROPENEM 1 G/30ML
INJECTION INTRAVENOUS
Refills: 0 | Status: DISCONTINUED | OUTPATIENT
Start: 2019-01-01 | End: 2019-01-01

## 2019-01-01 RX ORDER — SODIUM CHLORIDE 9 MG/ML
250 INJECTION INTRAMUSCULAR; INTRAVENOUS; SUBCUTANEOUS ONCE
Refills: 0 | Status: COMPLETED | OUTPATIENT
Start: 2019-01-01 | End: 2019-01-01

## 2019-01-01 RX ORDER — HALOPERIDOL DECANOATE 100 MG/ML
2.5 INJECTION INTRAMUSCULAR ONCE
Refills: 0 | Status: COMPLETED | OUTPATIENT
Start: 2019-01-01 | End: 2019-01-01

## 2019-01-01 RX ORDER — FLUCONAZOLE 150 MG/1
100 TABLET ORAL EVERY 24 HOURS
Refills: 0 | Status: COMPLETED | OUTPATIENT
Start: 2019-01-01 | End: 2019-01-01

## 2019-01-01 RX ORDER — MEPERIDINE HYDROCHLORIDE 50 MG/ML
12.5 INJECTION INTRAMUSCULAR; INTRAVENOUS; SUBCUTANEOUS
Refills: 0 | Status: DISCONTINUED | OUTPATIENT
Start: 2019-01-01 | End: 2019-01-01

## 2019-01-01 RX ORDER — POTASSIUM CHLORIDE 20 MEQ
40 PACKET (EA) ORAL ONCE
Refills: 0 | Status: DISCONTINUED | OUTPATIENT
Start: 2019-01-01 | End: 2019-01-01

## 2019-01-01 RX ADMIN — Medication 1 DROP(S): at 06:10

## 2019-01-01 RX ADMIN — LIDOCAINE 1 PATCH: 4 CREAM TOPICAL at 00:31

## 2019-01-01 RX ADMIN — Medication 20 MILLIGRAM(S): at 17:03

## 2019-01-01 RX ADMIN — MEROPENEM 100 MILLIGRAM(S): 1 INJECTION INTRAVENOUS at 21:29

## 2019-01-01 RX ADMIN — OXYCODONE HYDROCHLORIDE 10 MILLIGRAM(S): 5 TABLET ORAL at 09:56

## 2019-01-01 RX ADMIN — GABAPENTIN 600 MILLIGRAM(S): 400 CAPSULE ORAL at 22:44

## 2019-01-01 RX ADMIN — LIDOCAINE 1 PATCH: 4 CREAM TOPICAL at 22:31

## 2019-01-01 RX ADMIN — LIDOCAINE 1 PATCH: 4 CREAM TOPICAL at 11:08

## 2019-01-01 RX ADMIN — GABAPENTIN 600 MILLIGRAM(S): 400 CAPSULE ORAL at 08:41

## 2019-01-01 RX ADMIN — DULOXETINE HYDROCHLORIDE 20 MILLIGRAM(S): 30 CAPSULE, DELAYED RELEASE ORAL at 17:01

## 2019-01-01 RX ADMIN — ZOLPIDEM TARTRATE 5 MILLIGRAM(S): 10 TABLET ORAL at 23:14

## 2019-01-01 RX ADMIN — Medication 1 DROP(S): at 13:07

## 2019-01-01 RX ADMIN — Medication 500 MILLIGRAM(S): at 01:43

## 2019-01-01 RX ADMIN — Medication 2 DROP(S): at 17:46

## 2019-01-01 RX ADMIN — VALACYCLOVIR 1000 MILLIGRAM(S): 500 TABLET, FILM COATED ORAL at 17:34

## 2019-01-01 RX ADMIN — Medication 200 MILLIGRAM(S): at 17:14

## 2019-01-01 RX ADMIN — DULOXETINE HYDROCHLORIDE 30 MILLIGRAM(S): 30 CAPSULE, DELAYED RELEASE ORAL at 05:53

## 2019-01-01 RX ADMIN — ATORVASTATIN CALCIUM 40 MILLIGRAM(S): 80 TABLET, FILM COATED ORAL at 22:00

## 2019-01-01 RX ADMIN — PIPERACILLIN AND TAZOBACTAM 25 GRAM(S): 4; .5 INJECTION, POWDER, LYOPHILIZED, FOR SOLUTION INTRAVENOUS at 06:19

## 2019-01-01 RX ADMIN — Medication 500 MILLIGRAM(S): at 12:08

## 2019-01-01 RX ADMIN — GABAPENTIN 800 MILLIGRAM(S): 400 CAPSULE ORAL at 21:30

## 2019-01-01 RX ADMIN — Medication 1000 MILLIGRAM(S): at 13:57

## 2019-01-01 RX ADMIN — PANTOPRAZOLE SODIUM 40 MILLIGRAM(S): 20 TABLET, DELAYED RELEASE ORAL at 17:03

## 2019-01-01 RX ADMIN — GABAPENTIN 800 MILLIGRAM(S): 400 CAPSULE ORAL at 14:31

## 2019-01-01 RX ADMIN — LIDOCAINE 1 PATCH: 4 CREAM TOPICAL at 10:24

## 2019-01-01 RX ADMIN — POLYETHYLENE GLYCOL 3350 17 GRAM(S): 17 POWDER, FOR SOLUTION ORAL at 22:56

## 2019-01-01 RX ADMIN — VALACYCLOVIR 1000 MILLIGRAM(S): 500 TABLET, FILM COATED ORAL at 13:02

## 2019-01-01 RX ADMIN — Medication 40 MILLIEQUIVALENT(S): at 11:49

## 2019-01-01 RX ADMIN — Medication 1 DROP(S): at 18:22

## 2019-01-01 RX ADMIN — Medication 1 DROP(S): at 05:01

## 2019-01-01 RX ADMIN — DULOXETINE HYDROCHLORIDE 20 MILLIGRAM(S): 30 CAPSULE, DELAYED RELEASE ORAL at 17:36

## 2019-01-01 RX ADMIN — GABAPENTIN 800 MILLIGRAM(S): 400 CAPSULE ORAL at 05:23

## 2019-01-01 RX ADMIN — Medication 200 MILLIGRAM(S): at 14:12

## 2019-01-01 RX ADMIN — DULOXETINE HYDROCHLORIDE 30 MILLIGRAM(S): 30 CAPSULE, DELAYED RELEASE ORAL at 17:45

## 2019-01-01 RX ADMIN — Medication 650 MILLIGRAM(S): at 00:39

## 2019-01-01 RX ADMIN — Medication 1 DROP(S): at 18:15

## 2019-01-01 RX ADMIN — CHLORHEXIDINE GLUCONATE 15 MILLILITER(S): 213 SOLUTION TOPICAL at 18:40

## 2019-01-01 RX ADMIN — Medication 1 DROP(S): at 23:02

## 2019-01-01 RX ADMIN — Medication 1 APPLICATION(S): at 06:37

## 2019-01-01 RX ADMIN — PANTOPRAZOLE SODIUM 40 MILLIGRAM(S): 20 TABLET, DELAYED RELEASE ORAL at 18:13

## 2019-01-01 RX ADMIN — OXYCODONE AND ACETAMINOPHEN 1 TABLET(S): 5; 325 TABLET ORAL at 00:06

## 2019-01-01 RX ADMIN — Medication 20 MILLIGRAM(S): at 17:39

## 2019-01-01 RX ADMIN — DULOXETINE HYDROCHLORIDE 20 MILLIGRAM(S): 30 CAPSULE, DELAYED RELEASE ORAL at 18:47

## 2019-01-01 RX ADMIN — Medication 40 MILLIEQUIVALENT(S): at 09:03

## 2019-01-01 RX ADMIN — MORPHINE SULFATE 4 MILLIGRAM(S): 50 CAPSULE, EXTENDED RELEASE ORAL at 22:52

## 2019-01-01 RX ADMIN — GABAPENTIN 800 MILLIGRAM(S): 400 CAPSULE ORAL at 23:06

## 2019-01-01 RX ADMIN — Medication 500 MILLIGRAM(S): at 12:44

## 2019-01-01 RX ADMIN — VALACYCLOVIR 1000 MILLIGRAM(S): 500 TABLET, FILM COATED ORAL at 22:29

## 2019-01-01 RX ADMIN — GABAPENTIN 600 MILLIGRAM(S): 400 CAPSULE ORAL at 21:14

## 2019-01-01 RX ADMIN — Medication 1 DROP(S): at 20:03

## 2019-01-01 RX ADMIN — Medication 500 MILLIGRAM(S): at 14:31

## 2019-01-01 RX ADMIN — Medication 1000 MILLIGRAM(S): at 05:08

## 2019-01-01 RX ADMIN — PANTOPRAZOLE SODIUM 40 MILLIGRAM(S): 20 TABLET, DELAYED RELEASE ORAL at 17:53

## 2019-01-01 RX ADMIN — PANTOPRAZOLE SODIUM 10 MG/HR: 20 TABLET, DELAYED RELEASE ORAL at 21:14

## 2019-01-01 RX ADMIN — GABAPENTIN 800 MILLIGRAM(S): 400 CAPSULE ORAL at 22:20

## 2019-01-01 RX ADMIN — DULOXETINE HYDROCHLORIDE 30 MILLIGRAM(S): 30 CAPSULE, DELAYED RELEASE ORAL at 17:36

## 2019-01-01 RX ADMIN — VALACYCLOVIR 1000 MILLIGRAM(S): 500 TABLET, FILM COATED ORAL at 13:21

## 2019-01-01 RX ADMIN — DULOXETINE HYDROCHLORIDE 20 MILLIGRAM(S): 30 CAPSULE, DELAYED RELEASE ORAL at 06:44

## 2019-01-01 RX ADMIN — POLYETHYLENE GLYCOL 3350 17 GRAM(S): 17 POWDER, FOR SOLUTION ORAL at 11:40

## 2019-01-01 RX ADMIN — Medication 20 MILLIGRAM(S): at 21:33

## 2019-01-01 RX ADMIN — PIPERACILLIN AND TAZOBACTAM 25 GRAM(S): 4; .5 INJECTION, POWDER, LYOPHILIZED, FOR SOLUTION INTRAVENOUS at 22:11

## 2019-01-01 RX ADMIN — GABAPENTIN 800 MILLIGRAM(S): 400 CAPSULE ORAL at 05:21

## 2019-01-01 RX ADMIN — HYDROMORPHONE HYDROCHLORIDE 0.5 MILLIGRAM(S): 2 INJECTION INTRAMUSCULAR; INTRAVENOUS; SUBCUTANEOUS at 15:23

## 2019-01-01 RX ADMIN — GABAPENTIN 300 MILLIGRAM(S): 400 CAPSULE ORAL at 06:11

## 2019-01-01 RX ADMIN — GABAPENTIN 600 MILLIGRAM(S): 400 CAPSULE ORAL at 05:36

## 2019-01-01 RX ADMIN — VALACYCLOVIR 1000 MILLIGRAM(S): 500 TABLET, FILM COATED ORAL at 15:02

## 2019-01-01 RX ADMIN — Medication 400 MILLIGRAM(S): at 06:12

## 2019-01-01 RX ADMIN — PROPOFOL 1.65 MICROGRAM(S)/KG/MIN: 10 INJECTION, EMULSION INTRAVENOUS at 07:47

## 2019-01-01 RX ADMIN — LIDOCAINE 1 PATCH: 4 CREAM TOPICAL at 12:44

## 2019-01-01 RX ADMIN — OXYCODONE HYDROCHLORIDE 5 MILLIGRAM(S): 5 TABLET ORAL at 16:50

## 2019-01-01 RX ADMIN — ATORVASTATIN CALCIUM 40 MILLIGRAM(S): 80 TABLET, FILM COATED ORAL at 21:17

## 2019-01-01 RX ADMIN — LIDOCAINE 1 PATCH: 4 CREAM TOPICAL at 12:01

## 2019-01-01 RX ADMIN — POLYETHYLENE GLYCOL 3350 17 GRAM(S): 17 POWDER, FOR SOLUTION ORAL at 18:22

## 2019-01-01 RX ADMIN — Medication 1 DROP(S): at 06:31

## 2019-01-01 RX ADMIN — SODIUM CHLORIDE 3 MILLILITER(S): 9 INJECTION INTRAMUSCULAR; INTRAVENOUS; SUBCUTANEOUS at 05:27

## 2019-01-01 RX ADMIN — LIDOCAINE 1 PATCH: 4 CREAM TOPICAL at 00:10

## 2019-01-01 RX ADMIN — SODIUM CHLORIDE 3 MILLILITER(S): 9 INJECTION INTRAMUSCULAR; INTRAVENOUS; SUBCUTANEOUS at 21:46

## 2019-01-01 RX ADMIN — Medication 1000 MILLIGRAM(S): at 02:58

## 2019-01-01 RX ADMIN — HYDROMORPHONE HYDROCHLORIDE 1 MILLIGRAM(S): 2 INJECTION INTRAMUSCULAR; INTRAVENOUS; SUBCUTANEOUS at 05:07

## 2019-01-01 RX ADMIN — Medication 200 MILLIGRAM(S): at 21:18

## 2019-01-01 RX ADMIN — ATORVASTATIN CALCIUM 40 MILLIGRAM(S): 80 TABLET, FILM COATED ORAL at 21:07

## 2019-01-01 RX ADMIN — OXYCODONE AND ACETAMINOPHEN 2 TABLET(S): 5; 325 TABLET ORAL at 12:40

## 2019-01-01 RX ADMIN — DULOXETINE HYDROCHLORIDE 30 MILLIGRAM(S): 30 CAPSULE, DELAYED RELEASE ORAL at 05:58

## 2019-01-01 RX ADMIN — AMLODIPINE BESYLATE 5 MILLIGRAM(S): 2.5 TABLET ORAL at 05:47

## 2019-01-01 RX ADMIN — ATORVASTATIN CALCIUM 40 MILLIGRAM(S): 80 TABLET, FILM COATED ORAL at 21:03

## 2019-01-01 RX ADMIN — Medication 1 DROP(S): at 11:44

## 2019-01-01 RX ADMIN — Medication 200 MILLIGRAM(S): at 06:29

## 2019-01-01 RX ADMIN — Medication 20 MILLIGRAM(S): at 06:40

## 2019-01-01 RX ADMIN — Medication 1 DROP(S): at 13:48

## 2019-01-01 RX ADMIN — Medication 1 DROP(S): at 06:20

## 2019-01-01 RX ADMIN — VALACYCLOVIR 1000 MILLIGRAM(S): 500 TABLET, FILM COATED ORAL at 06:40

## 2019-01-01 RX ADMIN — Medication 200 MILLIGRAM(S): at 14:35

## 2019-01-01 RX ADMIN — OXYCODONE HYDROCHLORIDE 10 MILLIGRAM(S): 5 TABLET ORAL at 13:14

## 2019-01-01 RX ADMIN — Medication 1 DROP(S): at 23:22

## 2019-01-01 RX ADMIN — Medication 1 DROP(S): at 12:19

## 2019-01-01 RX ADMIN — PANTOPRAZOLE SODIUM 10 MG/HR: 20 TABLET, DELAYED RELEASE ORAL at 09:33

## 2019-01-01 RX ADMIN — Medication 50 GRAM(S): at 15:26

## 2019-01-01 RX ADMIN — OXYCODONE HYDROCHLORIDE 10 MILLIGRAM(S): 5 TABLET ORAL at 05:29

## 2019-01-01 RX ADMIN — ATORVASTATIN CALCIUM 40 MILLIGRAM(S): 80 TABLET, FILM COATED ORAL at 22:11

## 2019-01-01 RX ADMIN — DULOXETINE HYDROCHLORIDE 20 MILLIGRAM(S): 30 CAPSULE, DELAYED RELEASE ORAL at 18:29

## 2019-01-01 RX ADMIN — Medication 20 MILLIGRAM(S): at 21:47

## 2019-01-01 RX ADMIN — GABAPENTIN 600 MILLIGRAM(S): 400 CAPSULE ORAL at 21:08

## 2019-01-01 RX ADMIN — OXYCODONE HYDROCHLORIDE 10 MILLIGRAM(S): 5 TABLET ORAL at 09:21

## 2019-01-01 RX ADMIN — SODIUM CHLORIDE 3 MILLILITER(S): 9 INJECTION INTRAMUSCULAR; INTRAVENOUS; SUBCUTANEOUS at 14:36

## 2019-01-01 RX ADMIN — CHLORHEXIDINE GLUCONATE 15 MILLILITER(S): 213 SOLUTION TOPICAL at 05:17

## 2019-01-01 RX ADMIN — INSULIN HUMAN 10 UNIT(S): 100 INJECTION, SOLUTION SUBCUTANEOUS at 01:55

## 2019-01-01 RX ADMIN — CEFTRIAXONE 1000 MILLIGRAM(S): 500 INJECTION, POWDER, FOR SOLUTION INTRAMUSCULAR; INTRAVENOUS at 15:18

## 2019-01-01 RX ADMIN — DULOXETINE HYDROCHLORIDE 20 MILLIGRAM(S): 30 CAPSULE, DELAYED RELEASE ORAL at 17:32

## 2019-01-01 RX ADMIN — GABAPENTIN 800 MILLIGRAM(S): 400 CAPSULE ORAL at 14:25

## 2019-01-01 RX ADMIN — OXYCODONE HYDROCHLORIDE 10 MILLIGRAM(S): 5 TABLET ORAL at 14:20

## 2019-01-01 RX ADMIN — GABAPENTIN 600 MILLIGRAM(S): 400 CAPSULE ORAL at 05:49

## 2019-01-01 RX ADMIN — VALACYCLOVIR 1000 MILLIGRAM(S): 500 TABLET, FILM COATED ORAL at 12:03

## 2019-01-01 RX ADMIN — LIDOCAINE 1 PATCH: 4 CREAM TOPICAL at 00:09

## 2019-01-01 RX ADMIN — ATORVASTATIN CALCIUM 40 MILLIGRAM(S): 80 TABLET, FILM COATED ORAL at 21:14

## 2019-01-01 RX ADMIN — Medication 1 DROP(S): at 21:44

## 2019-01-01 RX ADMIN — PANTOPRAZOLE SODIUM 40 MILLIGRAM(S): 20 TABLET, DELAYED RELEASE ORAL at 05:26

## 2019-01-01 RX ADMIN — AMLODIPINE BESYLATE 5 MILLIGRAM(S): 2.5 TABLET ORAL at 05:22

## 2019-01-01 RX ADMIN — MORPHINE SULFATE 4 MILLIGRAM(S): 50 CAPSULE, EXTENDED RELEASE ORAL at 10:36

## 2019-01-01 RX ADMIN — Medication 1 DROP(S): at 19:36

## 2019-01-01 RX ADMIN — AMLODIPINE BESYLATE 5 MILLIGRAM(S): 2.5 TABLET ORAL at 18:09

## 2019-01-01 RX ADMIN — Medication 1 DROP(S): at 05:11

## 2019-01-01 RX ADMIN — VALACYCLOVIR 1000 MILLIGRAM(S): 500 TABLET, FILM COATED ORAL at 06:09

## 2019-01-01 RX ADMIN — PANTOPRAZOLE SODIUM 40 MILLIGRAM(S): 20 TABLET, DELAYED RELEASE ORAL at 18:52

## 2019-01-01 RX ADMIN — Medication 1 DROP(S): at 18:19

## 2019-01-01 RX ADMIN — Medication 1 DROP(S): at 16:17

## 2019-01-01 RX ADMIN — PROPOFOL 1.52 MICROGRAM(S)/KG/MIN: 10 INJECTION, EMULSION INTRAVENOUS at 06:00

## 2019-01-01 RX ADMIN — Medication 1 DROP(S): at 05:17

## 2019-01-01 RX ADMIN — VALACYCLOVIR 1000 MILLIGRAM(S): 500 TABLET, FILM COATED ORAL at 14:28

## 2019-01-01 RX ADMIN — Medication 1 DROP(S): at 18:36

## 2019-01-01 RX ADMIN — Medication 1 DROP(S): at 16:01

## 2019-01-01 RX ADMIN — Medication 1 DROP(S): at 18:56

## 2019-01-01 RX ADMIN — FENTANYL CITRATE 50 MICROGRAM(S): 50 INJECTION INTRAVENOUS at 20:00

## 2019-01-01 RX ADMIN — Medication 200 MILLIGRAM(S): at 21:03

## 2019-01-01 RX ADMIN — OXYCODONE HYDROCHLORIDE 10 MILLIGRAM(S): 5 TABLET ORAL at 15:11

## 2019-01-01 RX ADMIN — PANTOPRAZOLE SODIUM 40 MILLIGRAM(S): 20 TABLET, DELAYED RELEASE ORAL at 05:13

## 2019-01-01 RX ADMIN — OXYCODONE HYDROCHLORIDE 10 MILLIGRAM(S): 5 TABLET ORAL at 17:44

## 2019-01-01 RX ADMIN — ATORVASTATIN CALCIUM 40 MILLIGRAM(S): 80 TABLET, FILM COATED ORAL at 21:12

## 2019-01-01 RX ADMIN — PANTOPRAZOLE SODIUM 10 MG/HR: 20 TABLET, DELAYED RELEASE ORAL at 10:04

## 2019-01-01 RX ADMIN — Medication 1 TABLET(S): at 11:58

## 2019-01-01 RX ADMIN — MEROPENEM 100 MILLIGRAM(S): 1 INJECTION INTRAVENOUS at 07:01

## 2019-01-01 RX ADMIN — Medication 1 DROP(S): at 05:53

## 2019-01-01 RX ADMIN — Medication 200 MILLIGRAM(S): at 06:21

## 2019-01-01 RX ADMIN — Medication 1 DROP(S): at 13:25

## 2019-01-01 RX ADMIN — SODIUM CHLORIDE 3 MILLILITER(S): 9 INJECTION INTRAMUSCULAR; INTRAVENOUS; SUBCUTANEOUS at 13:50

## 2019-01-01 RX ADMIN — OXYCODONE HYDROCHLORIDE 10 MILLIGRAM(S): 5 TABLET ORAL at 22:10

## 2019-01-01 RX ADMIN — OXYCODONE HYDROCHLORIDE 10 MILLIGRAM(S): 5 TABLET ORAL at 06:44

## 2019-01-01 RX ADMIN — MORPHINE SULFATE 2 MILLIGRAM(S): 50 CAPSULE, EXTENDED RELEASE ORAL at 00:03

## 2019-01-01 RX ADMIN — POLYETHYLENE GLYCOL 3350 17 GRAM(S): 17 POWDER, FOR SOLUTION ORAL at 06:03

## 2019-01-01 RX ADMIN — VALACYCLOVIR 1000 MILLIGRAM(S): 500 TABLET, FILM COATED ORAL at 21:53

## 2019-01-01 RX ADMIN — Medication 1000 MILLIGRAM(S): at 13:00

## 2019-01-01 RX ADMIN — DULOXETINE HYDROCHLORIDE 30 MILLIGRAM(S): 30 CAPSULE, DELAYED RELEASE ORAL at 05:10

## 2019-01-01 RX ADMIN — FLUCONAZOLE 50 MILLIGRAM(S): 150 TABLET ORAL at 17:19

## 2019-01-01 RX ADMIN — ZOLPIDEM TARTRATE 5 MILLIGRAM(S): 10 TABLET ORAL at 03:21

## 2019-01-01 RX ADMIN — Medication 1 DROP(S): at 17:06

## 2019-01-01 RX ADMIN — Medication 20 MILLIGRAM(S): at 05:44

## 2019-01-01 RX ADMIN — Medication 1 DROP(S): at 13:30

## 2019-01-01 RX ADMIN — OXYCODONE HYDROCHLORIDE 10 MILLIGRAM(S): 5 TABLET ORAL at 08:27

## 2019-01-01 RX ADMIN — Medication 10 MILLIGRAM(S): at 21:31

## 2019-01-01 RX ADMIN — GABAPENTIN 600 MILLIGRAM(S): 400 CAPSULE ORAL at 21:18

## 2019-01-01 RX ADMIN — Medication 1 TABLET(S): at 11:41

## 2019-01-01 RX ADMIN — Medication 1 TABLET(S): at 12:29

## 2019-01-01 RX ADMIN — PIPERACILLIN AND TAZOBACTAM 25 GRAM(S): 4; .5 INJECTION, POWDER, LYOPHILIZED, FOR SOLUTION INTRAVENOUS at 14:35

## 2019-01-01 RX ADMIN — VALACYCLOVIR 1000 MILLIGRAM(S): 500 TABLET, FILM COATED ORAL at 05:50

## 2019-01-01 RX ADMIN — Medication 1 DROP(S): at 05:26

## 2019-01-01 RX ADMIN — ZOLPIDEM TARTRATE 5 MILLIGRAM(S): 10 TABLET ORAL at 00:07

## 2019-01-01 RX ADMIN — OXYCODONE HYDROCHLORIDE 10 MILLIGRAM(S): 5 TABLET ORAL at 18:00

## 2019-01-01 RX ADMIN — PANTOPRAZOLE SODIUM 40 MILLIGRAM(S): 20 TABLET, DELAYED RELEASE ORAL at 05:33

## 2019-01-01 RX ADMIN — GABAPENTIN 800 MILLIGRAM(S): 400 CAPSULE ORAL at 05:03

## 2019-01-01 RX ADMIN — CHLORHEXIDINE GLUCONATE 15 MILLILITER(S): 213 SOLUTION TOPICAL at 06:26

## 2019-01-01 RX ADMIN — Medication 1 DROP(S): at 17:39

## 2019-01-01 RX ADMIN — Medication 20 MILLIGRAM(S): at 21:55

## 2019-01-01 RX ADMIN — DULOXETINE HYDROCHLORIDE 20 MILLIGRAM(S): 30 CAPSULE, DELAYED RELEASE ORAL at 17:53

## 2019-01-01 RX ADMIN — PANTOPRAZOLE SODIUM 40 MILLIGRAM(S): 20 TABLET, DELAYED RELEASE ORAL at 18:08

## 2019-01-01 RX ADMIN — LIDOCAINE 2 PATCH: 4 CREAM TOPICAL at 10:03

## 2019-01-01 RX ADMIN — Medication 1 DROP(S): at 22:34

## 2019-01-01 RX ADMIN — Medication 1 DROP(S): at 18:33

## 2019-01-01 RX ADMIN — Medication 1 DROP(S): at 12:58

## 2019-01-01 RX ADMIN — OXYCODONE HYDROCHLORIDE 10 MILLIGRAM(S): 5 TABLET ORAL at 17:14

## 2019-01-01 RX ADMIN — OXYCODONE HYDROCHLORIDE 10 MILLIGRAM(S): 5 TABLET ORAL at 21:09

## 2019-01-01 RX ADMIN — POLYETHYLENE GLYCOL 3350 17 GRAM(S): 17 POWDER, FOR SOLUTION ORAL at 12:48

## 2019-01-01 RX ADMIN — Medication 1 DROP(S): at 23:09

## 2019-01-01 RX ADMIN — OXYCODONE HYDROCHLORIDE 10 MILLIGRAM(S): 5 TABLET ORAL at 14:58

## 2019-01-01 RX ADMIN — Medication 650 MILLIGRAM(S): at 23:00

## 2019-01-01 RX ADMIN — MORPHINE SULFATE 2 MILLIGRAM(S): 50 CAPSULE, EXTENDED RELEASE ORAL at 18:05

## 2019-01-01 RX ADMIN — Medication 1 DROP(S): at 22:31

## 2019-01-01 RX ADMIN — GABAPENTIN 600 MILLIGRAM(S): 400 CAPSULE ORAL at 13:18

## 2019-01-01 RX ADMIN — SODIUM CHLORIDE 3 MILLILITER(S): 9 INJECTION INTRAMUSCULAR; INTRAVENOUS; SUBCUTANEOUS at 14:52

## 2019-01-01 RX ADMIN — Medication 1 DROP(S): at 11:41

## 2019-01-01 RX ADMIN — VALACYCLOVIR 1000 MILLIGRAM(S): 500 TABLET, FILM COATED ORAL at 06:26

## 2019-01-01 RX ADMIN — PANTOPRAZOLE SODIUM 40 MILLIGRAM(S): 20 TABLET, DELAYED RELEASE ORAL at 17:34

## 2019-01-01 RX ADMIN — OXYCODONE HYDROCHLORIDE 10 MILLIGRAM(S): 5 TABLET ORAL at 08:30

## 2019-01-01 RX ADMIN — LIDOCAINE 1 PATCH: 4 CREAM TOPICAL at 19:40

## 2019-01-01 RX ADMIN — Medication 200 MILLIGRAM(S): at 18:47

## 2019-01-01 RX ADMIN — VALACYCLOVIR 1000 MILLIGRAM(S): 500 TABLET, FILM COATED ORAL at 16:02

## 2019-01-01 RX ADMIN — VALACYCLOVIR 1000 MILLIGRAM(S): 500 TABLET, FILM COATED ORAL at 06:39

## 2019-01-01 RX ADMIN — PANTOPRAZOLE SODIUM 40 MILLIGRAM(S): 20 TABLET, DELAYED RELEASE ORAL at 19:27

## 2019-01-01 RX ADMIN — Medication 1 DROP(S): at 18:09

## 2019-01-01 RX ADMIN — Medication 1 DROP(S): at 21:04

## 2019-01-01 RX ADMIN — LIDOCAINE 1 PATCH: 4 CREAM TOPICAL at 00:08

## 2019-01-01 RX ADMIN — Medication 1 DROP(S): at 11:12

## 2019-01-01 RX ADMIN — Medication 1 DROP(S): at 11:05

## 2019-01-01 RX ADMIN — VALACYCLOVIR 1000 MILLIGRAM(S): 500 TABLET, FILM COATED ORAL at 15:45

## 2019-01-01 RX ADMIN — CHLORHEXIDINE GLUCONATE 15 MILLILITER(S): 213 SOLUTION TOPICAL at 05:10

## 2019-01-01 RX ADMIN — Medication 40 MILLIEQUIVALENT(S): at 11:30

## 2019-01-01 RX ADMIN — MEROPENEM 100 MILLIGRAM(S): 1 INJECTION INTRAVENOUS at 14:23

## 2019-01-01 RX ADMIN — POLYETHYLENE GLYCOL 3350 17 GRAM(S): 17 POWDER, FOR SOLUTION ORAL at 18:23

## 2019-01-01 RX ADMIN — Medication 500 MILLIGRAM(S): at 07:08

## 2019-01-01 RX ADMIN — Medication 0.5 MILLIGRAM(S): at 23:20

## 2019-01-01 RX ADMIN — CHLORHEXIDINE GLUCONATE 15 MILLILITER(S): 213 SOLUTION TOPICAL at 17:01

## 2019-01-01 RX ADMIN — GABAPENTIN 600 MILLIGRAM(S): 400 CAPSULE ORAL at 22:10

## 2019-01-01 RX ADMIN — PANTOPRAZOLE SODIUM 10 MG/HR: 20 TABLET, DELAYED RELEASE ORAL at 11:45

## 2019-01-01 RX ADMIN — SODIUM CHLORIDE 150 MILLILITER(S): 9 INJECTION, SOLUTION INTRAVENOUS at 15:21

## 2019-01-01 RX ADMIN — OXYCODONE HYDROCHLORIDE 10 MILLIGRAM(S): 5 TABLET ORAL at 00:08

## 2019-01-01 RX ADMIN — PIPERACILLIN AND TAZOBACTAM 25 GRAM(S): 4; .5 INJECTION, POWDER, LYOPHILIZED, FOR SOLUTION INTRAVENOUS at 13:20

## 2019-01-01 RX ADMIN — DULOXETINE HYDROCHLORIDE 20 MILLIGRAM(S): 30 CAPSULE, DELAYED RELEASE ORAL at 05:27

## 2019-01-01 RX ADMIN — Medication 20 MILLIGRAM(S): at 04:40

## 2019-01-01 RX ADMIN — Medication 1 DROP(S): at 06:35

## 2019-01-01 RX ADMIN — PANTOPRAZOLE SODIUM 10 MG/HR: 20 TABLET, DELAYED RELEASE ORAL at 13:16

## 2019-01-01 RX ADMIN — POTASSIUM PHOSPHATE, MONOBASIC POTASSIUM PHOSPHATE, DIBASIC 83.33 MILLIMOLE(S): 236; 224 INJECTION, SOLUTION INTRAVENOUS at 11:33

## 2019-01-01 RX ADMIN — Medication 1 DROP(S): at 11:50

## 2019-01-01 RX ADMIN — MEROPENEM 100 MILLIGRAM(S): 1 INJECTION INTRAVENOUS at 04:51

## 2019-01-01 RX ADMIN — PIPERACILLIN AND TAZOBACTAM 25 GRAM(S): 4; .5 INJECTION, POWDER, LYOPHILIZED, FOR SOLUTION INTRAVENOUS at 23:37

## 2019-01-01 RX ADMIN — GABAPENTIN 600 MILLIGRAM(S): 400 CAPSULE ORAL at 23:43

## 2019-01-01 RX ADMIN — Medication 1 DROP(S): at 12:15

## 2019-01-01 RX ADMIN — Medication 500 MILLIGRAM(S): at 11:41

## 2019-01-01 RX ADMIN — Medication 100 MILLIGRAM(S): at 22:24

## 2019-01-01 RX ADMIN — OXYCODONE HYDROCHLORIDE 10 MILLIGRAM(S): 5 TABLET ORAL at 10:45

## 2019-01-01 RX ADMIN — SODIUM CHLORIDE 3 MILLILITER(S): 9 INJECTION INTRAMUSCULAR; INTRAVENOUS; SUBCUTANEOUS at 22:06

## 2019-01-01 RX ADMIN — Medication 200 MILLIGRAM(S): at 12:41

## 2019-01-01 RX ADMIN — OXYCODONE HYDROCHLORIDE 5 MILLIGRAM(S): 5 TABLET ORAL at 20:38

## 2019-01-01 RX ADMIN — Medication 200 MILLIGRAM(S): at 06:32

## 2019-01-01 RX ADMIN — Medication 100 MILLIGRAM(S): at 09:00

## 2019-01-01 RX ADMIN — PIPERACILLIN AND TAZOBACTAM 3.38 GRAM(S): 4; .5 INJECTION, POWDER, LYOPHILIZED, FOR SOLUTION INTRAVENOUS at 19:58

## 2019-01-01 RX ADMIN — OXYCODONE HYDROCHLORIDE 10 MILLIGRAM(S): 5 TABLET ORAL at 06:15

## 2019-01-01 RX ADMIN — CHLORHEXIDINE GLUCONATE 15 MILLILITER(S): 213 SOLUTION TOPICAL at 05:21

## 2019-01-01 RX ADMIN — MEROPENEM 100 MILLIGRAM(S): 1 INJECTION INTRAVENOUS at 13:01

## 2019-01-01 RX ADMIN — OXYCODONE HYDROCHLORIDE 10 MILLIGRAM(S): 5 TABLET ORAL at 14:00

## 2019-01-01 RX ADMIN — Medication 1 DROP(S): at 01:42

## 2019-01-01 RX ADMIN — Medication 1 DROP(S): at 17:12

## 2019-01-01 RX ADMIN — VALACYCLOVIR 1000 MILLIGRAM(S): 500 TABLET, FILM COATED ORAL at 21:21

## 2019-01-01 RX ADMIN — LIDOCAINE 1 PATCH: 4 CREAM TOPICAL at 19:26

## 2019-01-01 RX ADMIN — OXYCODONE HYDROCHLORIDE 10 MILLIGRAM(S): 5 TABLET ORAL at 21:01

## 2019-01-01 RX ADMIN — Medication 1 DROP(S): at 12:12

## 2019-01-01 RX ADMIN — LIDOCAINE 1 PATCH: 4 CREAM TOPICAL at 18:56

## 2019-01-01 RX ADMIN — PANTOPRAZOLE SODIUM 40 MILLIGRAM(S): 20 TABLET, DELAYED RELEASE ORAL at 05:29

## 2019-01-01 RX ADMIN — SODIUM CHLORIDE 3 MILLILITER(S): 9 INJECTION INTRAMUSCULAR; INTRAVENOUS; SUBCUTANEOUS at 06:11

## 2019-01-01 RX ADMIN — Medication 1 DROP(S): at 13:58

## 2019-01-01 RX ADMIN — OXYCODONE AND ACETAMINOPHEN 1 TABLET(S): 5; 325 TABLET ORAL at 01:00

## 2019-01-01 RX ADMIN — LIDOCAINE 1 PATCH: 4 CREAM TOPICAL at 19:35

## 2019-01-01 RX ADMIN — Medication 1 DROP(S): at 06:01

## 2019-01-01 RX ADMIN — MORPHINE SULFATE 4 MILLIGRAM(S): 50 CAPSULE, EXTENDED RELEASE ORAL at 18:10

## 2019-01-01 RX ADMIN — Medication 1 DROP(S): at 15:45

## 2019-01-01 RX ADMIN — SODIUM CHLORIDE 3 MILLILITER(S): 9 INJECTION INTRAMUSCULAR; INTRAVENOUS; SUBCUTANEOUS at 13:40

## 2019-01-01 RX ADMIN — VALACYCLOVIR 1000 MILLIGRAM(S): 500 TABLET, FILM COATED ORAL at 12:05

## 2019-01-01 RX ADMIN — POTASSIUM PHOSPHATE, MONOBASIC POTASSIUM PHOSPHATE, DIBASIC 63.75 MILLIMOLE(S): 236; 224 INJECTION, SOLUTION INTRAVENOUS at 00:19

## 2019-01-01 RX ADMIN — SODIUM CHLORIDE 3 MILLILITER(S): 9 INJECTION INTRAMUSCULAR; INTRAVENOUS; SUBCUTANEOUS at 13:10

## 2019-01-01 RX ADMIN — Medication 20 MILLIGRAM(S): at 05:20

## 2019-01-01 RX ADMIN — Medication 1 DROP(S): at 10:00

## 2019-01-01 RX ADMIN — LIDOCAINE 1 PATCH: 4 CREAM TOPICAL at 23:12

## 2019-01-01 RX ADMIN — Medication 200 MILLIGRAM(S): at 07:04

## 2019-01-01 RX ADMIN — FENTANYL CITRATE 50 MICROGRAM(S): 50 INJECTION INTRAVENOUS at 06:15

## 2019-01-01 RX ADMIN — LIDOCAINE 1 PATCH: 4 CREAM TOPICAL at 00:04

## 2019-01-01 RX ADMIN — DULOXETINE HYDROCHLORIDE 20 MILLIGRAM(S): 30 CAPSULE, DELAYED RELEASE ORAL at 06:20

## 2019-01-01 RX ADMIN — PANTOPRAZOLE SODIUM 40 MILLIGRAM(S): 20 TABLET, DELAYED RELEASE ORAL at 06:34

## 2019-01-01 RX ADMIN — Medication 1 DROP(S): at 13:19

## 2019-01-01 RX ADMIN — Medication 200 MILLIGRAM(S): at 19:04

## 2019-01-01 RX ADMIN — Medication 1 DROP(S): at 21:14

## 2019-01-01 RX ADMIN — PANTOPRAZOLE SODIUM 40 MILLIGRAM(S): 20 TABLET, DELAYED RELEASE ORAL at 17:33

## 2019-01-01 RX ADMIN — DULOXETINE HYDROCHLORIDE 20 MILLIGRAM(S): 30 CAPSULE, DELAYED RELEASE ORAL at 18:48

## 2019-01-01 RX ADMIN — OXYCODONE HYDROCHLORIDE 10 MILLIGRAM(S): 5 TABLET ORAL at 15:00

## 2019-01-01 RX ADMIN — CEFTRIAXONE 1000 MILLIGRAM(S): 500 INJECTION, POWDER, FOR SOLUTION INTRAMUSCULAR; INTRAVENOUS at 18:13

## 2019-01-01 RX ADMIN — Medication 1 DROP(S): at 18:02

## 2019-01-01 RX ADMIN — Medication 1 DROP(S): at 11:58

## 2019-01-01 RX ADMIN — HYDROMORPHONE HYDROCHLORIDE 1 MILLIGRAM(S): 2 INJECTION INTRAMUSCULAR; INTRAVENOUS; SUBCUTANEOUS at 15:00

## 2019-01-01 RX ADMIN — SODIUM CHLORIDE 3 MILLILITER(S): 9 INJECTION INTRAMUSCULAR; INTRAVENOUS; SUBCUTANEOUS at 15:20

## 2019-01-01 RX ADMIN — Medication 100 MILLIGRAM(S): at 05:49

## 2019-01-01 RX ADMIN — Medication 1 DROP(S): at 05:58

## 2019-01-01 RX ADMIN — GABAPENTIN 800 MILLIGRAM(S): 400 CAPSULE ORAL at 21:48

## 2019-01-01 RX ADMIN — PANTOPRAZOLE SODIUM 40 MILLIGRAM(S): 20 TABLET, DELAYED RELEASE ORAL at 06:19

## 2019-01-01 RX ADMIN — AMLODIPINE BESYLATE 5 MILLIGRAM(S): 2.5 TABLET ORAL at 06:03

## 2019-01-01 RX ADMIN — Medication 1 DROP(S): at 08:32

## 2019-01-01 RX ADMIN — Medication 20 MILLIGRAM(S): at 05:17

## 2019-01-01 RX ADMIN — OXYCODONE AND ACETAMINOPHEN 1 TABLET(S): 5; 325 TABLET ORAL at 22:48

## 2019-01-01 RX ADMIN — CARBAMIDE PEROXIDE 10 DROP(S): 81.86 SOLUTION/ DROPS AURICULAR (OTIC) at 12:05

## 2019-01-01 RX ADMIN — OXYCODONE HYDROCHLORIDE 10 MILLIGRAM(S): 5 TABLET ORAL at 21:07

## 2019-01-01 RX ADMIN — Medication 100 MILLIGRAM(S): at 22:30

## 2019-01-01 RX ADMIN — POLYETHYLENE GLYCOL 3350 17 GRAM(S): 17 POWDER, FOR SOLUTION ORAL at 06:47

## 2019-01-01 RX ADMIN — Medication 20 MILLIGRAM(S): at 17:34

## 2019-01-01 RX ADMIN — OXYCODONE AND ACETAMINOPHEN 2 TABLET(S): 5; 325 TABLET ORAL at 14:27

## 2019-01-01 RX ADMIN — Medication 1 DROP(S): at 23:32

## 2019-01-01 RX ADMIN — GABAPENTIN 600 MILLIGRAM(S): 400 CAPSULE ORAL at 13:06

## 2019-01-01 RX ADMIN — Medication 1 DROP(S): at 10:45

## 2019-01-01 RX ADMIN — Medication 1 MILLIGRAM(S): at 03:37

## 2019-01-01 RX ADMIN — AMLODIPINE BESYLATE 5 MILLIGRAM(S): 2.5 TABLET ORAL at 05:57

## 2019-01-01 RX ADMIN — MORPHINE SULFATE 2 MILLIGRAM(S): 50 CAPSULE, EXTENDED RELEASE ORAL at 22:15

## 2019-01-01 RX ADMIN — LIDOCAINE 1 PATCH: 4 CREAM TOPICAL at 01:05

## 2019-01-01 RX ADMIN — PANTOPRAZOLE SODIUM 40 MILLIGRAM(S): 20 TABLET, DELAYED RELEASE ORAL at 17:12

## 2019-01-01 RX ADMIN — GABAPENTIN 600 MILLIGRAM(S): 400 CAPSULE ORAL at 13:57

## 2019-01-01 RX ADMIN — LIDOCAINE 2 PATCH: 4 CREAM TOPICAL at 20:37

## 2019-01-01 RX ADMIN — DULOXETINE HYDROCHLORIDE 30 MILLIGRAM(S): 30 CAPSULE, DELAYED RELEASE ORAL at 21:02

## 2019-01-01 RX ADMIN — Medication 10 MILLIGRAM(S): at 20:11

## 2019-01-01 RX ADMIN — MORPHINE SULFATE 2 MILLIGRAM(S): 50 CAPSULE, EXTENDED RELEASE ORAL at 07:00

## 2019-01-01 RX ADMIN — Medication 1 DROP(S): at 06:09

## 2019-01-01 RX ADMIN — Medication 200 MILLIGRAM(S): at 23:14

## 2019-01-01 RX ADMIN — Medication 650 MILLIGRAM(S): at 13:28

## 2019-01-01 RX ADMIN — POLYETHYLENE GLYCOL 3350 17 GRAM(S): 17 POWDER, FOR SOLUTION ORAL at 17:37

## 2019-01-01 RX ADMIN — Medication 1 DROP(S): at 18:42

## 2019-01-01 RX ADMIN — GABAPENTIN 800 MILLIGRAM(S): 400 CAPSULE ORAL at 13:52

## 2019-01-01 RX ADMIN — OXYCODONE HYDROCHLORIDE 10 MILLIGRAM(S): 5 TABLET ORAL at 02:00

## 2019-01-01 RX ADMIN — GABAPENTIN 600 MILLIGRAM(S): 400 CAPSULE ORAL at 05:21

## 2019-01-01 RX ADMIN — ATORVASTATIN CALCIUM 40 MILLIGRAM(S): 80 TABLET, FILM COATED ORAL at 21:18

## 2019-01-01 RX ADMIN — Medication 1 DROP(S): at 23:48

## 2019-01-01 RX ADMIN — VALACYCLOVIR 1000 MILLIGRAM(S): 500 TABLET, FILM COATED ORAL at 12:19

## 2019-01-01 RX ADMIN — Medication 1 DROP(S): at 00:41

## 2019-01-01 RX ADMIN — LIDOCAINE 1 PATCH: 4 CREAM TOPICAL at 09:09

## 2019-01-01 RX ADMIN — PANTOPRAZOLE SODIUM 40 MILLIGRAM(S): 20 TABLET, DELAYED RELEASE ORAL at 05:17

## 2019-01-01 RX ADMIN — ZOLPIDEM TARTRATE 5 MILLIGRAM(S): 10 TABLET ORAL at 22:11

## 2019-01-01 RX ADMIN — CEFTRIAXONE 100 MILLIGRAM(S): 500 INJECTION, POWDER, FOR SOLUTION INTRAMUSCULAR; INTRAVENOUS at 19:51

## 2019-01-01 RX ADMIN — GABAPENTIN 600 MILLIGRAM(S): 400 CAPSULE ORAL at 06:37

## 2019-01-01 RX ADMIN — OXYCODONE HYDROCHLORIDE 10 MILLIGRAM(S): 5 TABLET ORAL at 06:55

## 2019-01-01 RX ADMIN — Medication 1 DROP(S): at 11:28

## 2019-01-01 RX ADMIN — Medication 20 MILLIGRAM(S): at 17:07

## 2019-01-01 RX ADMIN — Medication 105 MILLIGRAM(S): at 17:48

## 2019-01-01 RX ADMIN — Medication 1 TABLET(S): at 12:33

## 2019-01-01 RX ADMIN — SODIUM CHLORIDE 3 MILLILITER(S): 9 INJECTION INTRAMUSCULAR; INTRAVENOUS; SUBCUTANEOUS at 22:22

## 2019-01-01 RX ADMIN — Medication 20 MILLIGRAM(S): at 18:21

## 2019-01-01 RX ADMIN — Medication 1 DROP(S): at 06:54

## 2019-01-01 RX ADMIN — Medication 1 DROP(S): at 21:35

## 2019-01-01 RX ADMIN — LIDOCAINE 1 PATCH: 4 CREAM TOPICAL at 11:00

## 2019-01-01 RX ADMIN — Medication 62.5 MILLIMOLE(S): at 18:58

## 2019-01-01 RX ADMIN — VALACYCLOVIR 1000 MILLIGRAM(S): 500 TABLET, FILM COATED ORAL at 21:49

## 2019-01-01 RX ADMIN — Medication 20 MILLIGRAM(S): at 07:22

## 2019-01-01 RX ADMIN — LIDOCAINE 1 PATCH: 4 CREAM TOPICAL at 00:38

## 2019-01-01 RX ADMIN — OXYCODONE HYDROCHLORIDE 10 MILLIGRAM(S): 5 TABLET ORAL at 17:30

## 2019-01-01 RX ADMIN — Medication 1000 MILLIGRAM(S): at 22:11

## 2019-01-01 RX ADMIN — Medication 650 MILLIGRAM(S): at 23:50

## 2019-01-01 RX ADMIN — MEROPENEM 100 MILLIGRAM(S): 1 INJECTION INTRAVENOUS at 23:58

## 2019-01-01 RX ADMIN — Medication 650 MILLIGRAM(S): at 19:34

## 2019-01-01 RX ADMIN — Medication 1 DROP(S): at 22:21

## 2019-01-01 RX ADMIN — ATORVASTATIN CALCIUM 40 MILLIGRAM(S): 80 TABLET, FILM COATED ORAL at 22:14

## 2019-01-01 RX ADMIN — OXYCODONE HYDROCHLORIDE 10 MILLIGRAM(S): 5 TABLET ORAL at 16:00

## 2019-01-01 RX ADMIN — Medication 100 MILLIGRAM(S): at 05:56

## 2019-01-01 RX ADMIN — Medication 1 DROP(S): at 09:29

## 2019-01-01 RX ADMIN — SODIUM CHLORIDE 4000 MILLILITER(S): 9 INJECTION, SOLUTION INTRAVENOUS at 19:10

## 2019-01-01 RX ADMIN — Medication 1 DROP(S): at 18:35

## 2019-01-01 RX ADMIN — Medication 100 MILLIGRAM(S): at 16:02

## 2019-01-01 RX ADMIN — PANTOPRAZOLE SODIUM 40 MILLIGRAM(S): 20 TABLET, DELAYED RELEASE ORAL at 17:36

## 2019-01-01 RX ADMIN — Medication 1 DROP(S): at 00:01

## 2019-01-01 RX ADMIN — CHLORHEXIDINE GLUCONATE 15 MILLILITER(S): 213 SOLUTION TOPICAL at 05:13

## 2019-01-01 RX ADMIN — Medication 1 DROP(S): at 05:59

## 2019-01-01 RX ADMIN — OXYCODONE HYDROCHLORIDE 10 MILLIGRAM(S): 5 TABLET ORAL at 07:41

## 2019-01-01 RX ADMIN — DULOXETINE HYDROCHLORIDE 30 MILLIGRAM(S): 30 CAPSULE, DELAYED RELEASE ORAL at 17:51

## 2019-01-01 RX ADMIN — Medication 1 DROP(S): at 05:35

## 2019-01-01 RX ADMIN — Medication 1 DROP(S): at 13:36

## 2019-01-01 RX ADMIN — Medication 20 MILLIGRAM(S): at 10:09

## 2019-01-01 RX ADMIN — Medication 40 MILLIEQUIVALENT(S): at 08:01

## 2019-01-01 RX ADMIN — SODIUM CHLORIDE 3 MILLILITER(S): 9 INJECTION INTRAMUSCULAR; INTRAVENOUS; SUBCUTANEOUS at 16:06

## 2019-01-01 RX ADMIN — GABAPENTIN 600 MILLIGRAM(S): 400 CAPSULE ORAL at 21:59

## 2019-01-01 RX ADMIN — SODIUM CHLORIDE 2000 MILLILITER(S): 9 INJECTION, SOLUTION INTRAVENOUS at 03:00

## 2019-01-01 RX ADMIN — LIDOCAINE 1 PATCH: 4 CREAM TOPICAL at 14:30

## 2019-01-01 RX ADMIN — Medication 20 MILLIGRAM(S): at 17:20

## 2019-01-01 RX ADMIN — OXYCODONE AND ACETAMINOPHEN 2 TABLET(S): 5; 325 TABLET ORAL at 06:57

## 2019-01-01 RX ADMIN — PANTOPRAZOLE SODIUM 40 MILLIGRAM(S): 20 TABLET, DELAYED RELEASE ORAL at 17:31

## 2019-01-01 RX ADMIN — Medication 1 DROP(S): at 14:00

## 2019-01-01 RX ADMIN — GABAPENTIN 800 MILLIGRAM(S): 400 CAPSULE ORAL at 21:56

## 2019-01-01 RX ADMIN — DEXMEDETOMIDINE HYDROCHLORIDE IN 0.9% SODIUM CHLORIDE 2.75 MICROGRAM(S)/KG/HR: 4 INJECTION INTRAVENOUS at 21:58

## 2019-01-01 RX ADMIN — OXYCODONE HYDROCHLORIDE 10 MILLIGRAM(S): 5 TABLET ORAL at 11:45

## 2019-01-01 RX ADMIN — Medication 105 MILLIGRAM(S): at 05:06

## 2019-01-01 RX ADMIN — Medication 2 DROP(S): at 18:47

## 2019-01-01 RX ADMIN — Medication 1 TABLET(S): at 12:19

## 2019-01-01 RX ADMIN — GABAPENTIN 800 MILLIGRAM(S): 400 CAPSULE ORAL at 06:09

## 2019-01-01 RX ADMIN — Medication 1 DROP(S): at 12:49

## 2019-01-01 RX ADMIN — OXYCODONE HYDROCHLORIDE 10 MILLIGRAM(S): 5 TABLET ORAL at 19:39

## 2019-01-01 RX ADMIN — Medication 1 DROP(S): at 11:36

## 2019-01-01 RX ADMIN — OXYCODONE HYDROCHLORIDE 5 MILLIGRAM(S): 5 TABLET ORAL at 17:53

## 2019-01-01 RX ADMIN — SODIUM CHLORIDE 3 MILLILITER(S): 9 INJECTION INTRAMUSCULAR; INTRAVENOUS; SUBCUTANEOUS at 14:12

## 2019-01-01 RX ADMIN — Medication 20 MILLIGRAM(S): at 05:21

## 2019-01-01 RX ADMIN — Medication 1 DROP(S): at 06:26

## 2019-01-01 RX ADMIN — Medication 100 MILLIGRAM(S): at 06:44

## 2019-01-01 RX ADMIN — FENTANYL CITRATE 2.75 MICROGRAM(S)/KG/HR: 50 INJECTION INTRAVENOUS at 07:16

## 2019-01-01 RX ADMIN — Medication 650 MILLIGRAM(S): at 22:11

## 2019-01-01 RX ADMIN — DULOXETINE HYDROCHLORIDE 30 MILLIGRAM(S): 30 CAPSULE, DELAYED RELEASE ORAL at 06:09

## 2019-01-01 RX ADMIN — MIDAZOLAM HYDROCHLORIDE 4 MILLIGRAM(S): 1 INJECTION, SOLUTION INTRAMUSCULAR; INTRAVENOUS at 20:35

## 2019-01-01 RX ADMIN — GABAPENTIN 800 MILLIGRAM(S): 400 CAPSULE ORAL at 21:23

## 2019-01-01 RX ADMIN — Medication 100 MILLIEQUIVALENT(S): at 06:44

## 2019-01-01 RX ADMIN — Medication 1 DROP(S): at 17:18

## 2019-01-01 RX ADMIN — Medication 1 DROP(S): at 13:22

## 2019-01-01 RX ADMIN — Medication 20 MILLIGRAM(S): at 06:02

## 2019-01-01 RX ADMIN — Medication 1 DROP(S): at 06:16

## 2019-01-01 RX ADMIN — MORPHINE SULFATE 2 MILLIGRAM(S): 50 CAPSULE, EXTENDED RELEASE ORAL at 10:13

## 2019-01-01 RX ADMIN — CEFTRIAXONE 100 MILLIGRAM(S): 500 INJECTION, POWDER, FOR SOLUTION INTRAMUSCULAR; INTRAVENOUS at 12:30

## 2019-01-01 RX ADMIN — Medication 1 ENEMA: at 07:15

## 2019-01-01 RX ADMIN — DULOXETINE HYDROCHLORIDE 20 MILLIGRAM(S): 30 CAPSULE, DELAYED RELEASE ORAL at 05:04

## 2019-01-01 RX ADMIN — OXYCODONE HYDROCHLORIDE 10 MILLIGRAM(S): 5 TABLET ORAL at 16:45

## 2019-01-01 RX ADMIN — GABAPENTIN 800 MILLIGRAM(S): 400 CAPSULE ORAL at 05:52

## 2019-01-01 RX ADMIN — Medication 1 DROP(S): at 05:51

## 2019-01-01 RX ADMIN — VALACYCLOVIR 1000 MILLIGRAM(S): 500 TABLET, FILM COATED ORAL at 13:37

## 2019-01-01 RX ADMIN — OXYCODONE AND ACETAMINOPHEN 2 TABLET(S): 5; 325 TABLET ORAL at 18:14

## 2019-01-01 RX ADMIN — OXYCODONE HYDROCHLORIDE 5 MILLIGRAM(S): 5 TABLET ORAL at 17:19

## 2019-01-01 RX ADMIN — OXYCODONE HYDROCHLORIDE 10 MILLIGRAM(S): 5 TABLET ORAL at 21:14

## 2019-01-01 RX ADMIN — VALACYCLOVIR 1000 MILLIGRAM(S): 500 TABLET, FILM COATED ORAL at 23:15

## 2019-01-01 RX ADMIN — LIDOCAINE 1 PATCH: 4 CREAM TOPICAL at 09:27

## 2019-01-01 RX ADMIN — VALACYCLOVIR 1000 MILLIGRAM(S): 500 TABLET, FILM COATED ORAL at 11:22

## 2019-01-01 RX ADMIN — SODIUM CHLORIDE 90 MILLILITER(S): 9 INJECTION, SOLUTION INTRAVENOUS at 21:58

## 2019-01-01 RX ADMIN — Medication 1 DROP(S): at 13:20

## 2019-01-01 RX ADMIN — Medication 400 MILLIGRAM(S): at 05:42

## 2019-01-01 RX ADMIN — GABAPENTIN 600 MILLIGRAM(S): 400 CAPSULE ORAL at 21:03

## 2019-01-01 RX ADMIN — ATORVASTATIN CALCIUM 40 MILLIGRAM(S): 80 TABLET, FILM COATED ORAL at 22:05

## 2019-01-01 RX ADMIN — GABAPENTIN 600 MILLIGRAM(S): 400 CAPSULE ORAL at 13:29

## 2019-01-01 RX ADMIN — OXYCODONE HYDROCHLORIDE 10 MILLIGRAM(S): 5 TABLET ORAL at 08:03

## 2019-01-01 RX ADMIN — OXYCODONE AND ACETAMINOPHEN 2 TABLET(S): 5; 325 TABLET ORAL at 23:30

## 2019-01-01 RX ADMIN — Medication 1 DROP(S): at 11:08

## 2019-01-01 RX ADMIN — Medication 1 APPLICATION(S): at 21:17

## 2019-01-01 RX ADMIN — LIDOCAINE 1 PATCH: 4 CREAM TOPICAL at 20:23

## 2019-01-01 RX ADMIN — CHLORHEXIDINE GLUCONATE 1 APPLICATION(S): 213 SOLUTION TOPICAL at 06:35

## 2019-01-01 RX ADMIN — Medication 1 DROP(S): at 05:12

## 2019-01-01 RX ADMIN — VALACYCLOVIR 1000 MILLIGRAM(S): 500 TABLET, FILM COATED ORAL at 04:32

## 2019-01-01 RX ADMIN — HYDROMORPHONE HYDROCHLORIDE 1 MILLIGRAM(S): 2 INJECTION INTRAMUSCULAR; INTRAVENOUS; SUBCUTANEOUS at 14:42

## 2019-01-01 RX ADMIN — LIDOCAINE 1 PATCH: 4 CREAM TOPICAL at 00:17

## 2019-01-01 RX ADMIN — Medication 1 DROP(S): at 19:06

## 2019-01-01 RX ADMIN — Medication 1 DROP(S): at 17:20

## 2019-01-01 RX ADMIN — Medication 500 MILLIGRAM(S): at 12:34

## 2019-01-01 RX ADMIN — OXYCODONE HYDROCHLORIDE 10 MILLIGRAM(S): 5 TABLET ORAL at 18:35

## 2019-01-01 RX ADMIN — Medication 1 DROP(S): at 11:22

## 2019-01-01 RX ADMIN — ATORVASTATIN CALCIUM 40 MILLIGRAM(S): 80 TABLET, FILM COATED ORAL at 23:02

## 2019-01-01 RX ADMIN — CHLORHEXIDINE GLUCONATE 15 MILLILITER(S): 213 SOLUTION TOPICAL at 19:26

## 2019-01-01 RX ADMIN — ATORVASTATIN CALCIUM 40 MILLIGRAM(S): 80 TABLET, FILM COATED ORAL at 23:17

## 2019-01-01 RX ADMIN — ZOLPIDEM TARTRATE 5 MILLIGRAM(S): 10 TABLET ORAL at 21:34

## 2019-01-01 RX ADMIN — ZOLPIDEM TARTRATE 5 MILLIGRAM(S): 10 TABLET ORAL at 00:08

## 2019-01-01 RX ADMIN — Medication 20 MILLIGRAM(S): at 06:20

## 2019-01-01 RX ADMIN — OXYCODONE HYDROCHLORIDE 10 MILLIGRAM(S): 5 TABLET ORAL at 15:10

## 2019-01-01 RX ADMIN — SODIUM CHLORIDE 1000 MILLILITER(S): 9 INJECTION INTRAMUSCULAR; INTRAVENOUS; SUBCUTANEOUS at 04:42

## 2019-01-01 RX ADMIN — HYDROMORPHONE HYDROCHLORIDE 1 MILLIGRAM(S): 2 INJECTION INTRAMUSCULAR; INTRAVENOUS; SUBCUTANEOUS at 19:59

## 2019-01-01 RX ADMIN — VALACYCLOVIR 1000 MILLIGRAM(S): 500 TABLET, FILM COATED ORAL at 21:29

## 2019-01-01 RX ADMIN — Medication 100 MILLIGRAM(S): at 06:40

## 2019-01-01 RX ADMIN — MORPHINE SULFATE 4 MILLIGRAM(S): 50 CAPSULE, EXTENDED RELEASE ORAL at 14:05

## 2019-01-01 RX ADMIN — GABAPENTIN 800 MILLIGRAM(S): 400 CAPSULE ORAL at 06:37

## 2019-01-01 RX ADMIN — LIDOCAINE 1 PATCH: 4 CREAM TOPICAL at 07:21

## 2019-01-01 RX ADMIN — VALACYCLOVIR 1000 MILLIGRAM(S): 500 TABLET, FILM COATED ORAL at 21:33

## 2019-01-01 RX ADMIN — DULOXETINE HYDROCHLORIDE 30 MILLIGRAM(S): 30 CAPSULE, DELAYED RELEASE ORAL at 05:52

## 2019-01-01 RX ADMIN — Medication 1 DROP(S): at 07:02

## 2019-01-01 RX ADMIN — Medication 100 MILLIEQUIVALENT(S): at 10:32

## 2019-01-01 RX ADMIN — MEROPENEM 100 MILLIGRAM(S): 1 INJECTION INTRAVENOUS at 07:52

## 2019-01-01 RX ADMIN — Medication 1 DROP(S): at 12:38

## 2019-01-01 RX ADMIN — SODIUM CHLORIDE 3 MILLILITER(S): 9 INJECTION INTRAMUSCULAR; INTRAVENOUS; SUBCUTANEOUS at 13:15

## 2019-01-01 RX ADMIN — SODIUM CHLORIDE 3 MILLILITER(S): 9 INJECTION INTRAMUSCULAR; INTRAVENOUS; SUBCUTANEOUS at 13:21

## 2019-01-01 RX ADMIN — LIDOCAINE 1 PATCH: 4 CREAM TOPICAL at 19:46

## 2019-01-01 RX ADMIN — OXYCODONE AND ACETAMINOPHEN 2 TABLET(S): 5; 325 TABLET ORAL at 02:50

## 2019-01-01 RX ADMIN — CEFTRIAXONE 1000 MILLIGRAM(S): 500 INJECTION, POWDER, FOR SOLUTION INTRAMUSCULAR; INTRAVENOUS at 18:27

## 2019-01-01 RX ADMIN — OXYCODONE HYDROCHLORIDE 10 MILLIGRAM(S): 5 TABLET ORAL at 05:34

## 2019-01-01 RX ADMIN — Medication 1 DROP(S): at 12:53

## 2019-01-01 RX ADMIN — Medication 1 DROP(S): at 18:26

## 2019-01-01 RX ADMIN — Medication 1000 MILLIGRAM(S): at 21:38

## 2019-01-01 RX ADMIN — GABAPENTIN 600 MILLIGRAM(S): 400 CAPSULE ORAL at 06:04

## 2019-01-01 RX ADMIN — Medication 12.5 GRAM(S): at 01:26

## 2019-01-01 RX ADMIN — Medication 2 MILLIGRAM(S): at 12:55

## 2019-01-01 RX ADMIN — OXYCODONE HYDROCHLORIDE 10 MILLIGRAM(S): 5 TABLET ORAL at 09:06

## 2019-01-01 RX ADMIN — GABAPENTIN 800 MILLIGRAM(S): 400 CAPSULE ORAL at 13:28

## 2019-01-01 RX ADMIN — Medication 20 MILLIGRAM(S): at 06:03

## 2019-01-01 RX ADMIN — GABAPENTIN 600 MILLIGRAM(S): 400 CAPSULE ORAL at 05:07

## 2019-01-01 RX ADMIN — OXYCODONE AND ACETAMINOPHEN 2 TABLET(S): 5; 325 TABLET ORAL at 17:50

## 2019-01-01 RX ADMIN — Medication 1 DROP(S): at 19:37

## 2019-01-01 RX ADMIN — Medication 1 DROP(S): at 18:29

## 2019-01-01 RX ADMIN — OXYCODONE AND ACETAMINOPHEN 2 TABLET(S): 5; 325 TABLET ORAL at 13:34

## 2019-01-01 RX ADMIN — PANTOPRAZOLE SODIUM 40 MILLIGRAM(S): 20 TABLET, DELAYED RELEASE ORAL at 17:06

## 2019-01-01 RX ADMIN — Medication 1 DROP(S): at 11:55

## 2019-01-01 RX ADMIN — SODIUM CHLORIDE 3 MILLILITER(S): 9 INJECTION INTRAMUSCULAR; INTRAVENOUS; SUBCUTANEOUS at 05:13

## 2019-01-01 RX ADMIN — OXYCODONE HYDROCHLORIDE 10 MILLIGRAM(S): 5 TABLET ORAL at 06:20

## 2019-01-01 RX ADMIN — CHLORHEXIDINE GLUCONATE 15 MILLILITER(S): 213 SOLUTION TOPICAL at 06:03

## 2019-01-01 RX ADMIN — VALACYCLOVIR 1000 MILLIGRAM(S): 500 TABLET, FILM COATED ORAL at 23:00

## 2019-01-01 RX ADMIN — Medication 1 APPLICATION(S): at 23:58

## 2019-01-01 RX ADMIN — VALACYCLOVIR 1000 MILLIGRAM(S): 500 TABLET, FILM COATED ORAL at 17:46

## 2019-01-01 RX ADMIN — Medication 1 DROP(S): at 14:31

## 2019-01-01 RX ADMIN — OXYCODONE HYDROCHLORIDE 10 MILLIGRAM(S): 5 TABLET ORAL at 06:19

## 2019-01-01 RX ADMIN — Medication 10 MILLIGRAM(S): at 11:45

## 2019-01-01 RX ADMIN — ATORVASTATIN CALCIUM 40 MILLIGRAM(S): 80 TABLET, FILM COATED ORAL at 22:29

## 2019-01-01 RX ADMIN — PANTOPRAZOLE SODIUM 40 MILLIGRAM(S): 20 TABLET, DELAYED RELEASE ORAL at 05:47

## 2019-01-01 RX ADMIN — Medication 1 DROP(S): at 22:57

## 2019-01-01 RX ADMIN — OXYCODONE HYDROCHLORIDE 10 MILLIGRAM(S): 5 TABLET ORAL at 13:01

## 2019-01-01 RX ADMIN — MORPHINE SULFATE 2 MILLIGRAM(S): 50 CAPSULE, EXTENDED RELEASE ORAL at 10:09

## 2019-01-01 RX ADMIN — Medication 1 DROP(S): at 16:28

## 2019-01-01 RX ADMIN — MEROPENEM 100 MILLIGRAM(S): 1 INJECTION INTRAVENOUS at 14:33

## 2019-01-01 RX ADMIN — MORPHINE SULFATE 2 MILLIGRAM(S): 50 CAPSULE, EXTENDED RELEASE ORAL at 13:18

## 2019-01-01 RX ADMIN — PROPOFOL 1.44 MICROGRAM(S)/KG/MIN: 10 INJECTION, EMULSION INTRAVENOUS at 11:12

## 2019-01-01 RX ADMIN — OXYCODONE AND ACETAMINOPHEN 2 TABLET(S): 5; 325 TABLET ORAL at 19:26

## 2019-01-01 RX ADMIN — HYDROMORPHONE HYDROCHLORIDE 0.5 MILLIGRAM(S): 2 INJECTION INTRAMUSCULAR; INTRAVENOUS; SUBCUTANEOUS at 11:34

## 2019-01-01 RX ADMIN — Medication 1 DROP(S): at 05:43

## 2019-01-01 RX ADMIN — Medication 2 DROP(S): at 11:50

## 2019-01-01 RX ADMIN — PANTOPRAZOLE SODIUM 40 MILLIGRAM(S): 20 TABLET, DELAYED RELEASE ORAL at 18:47

## 2019-01-01 RX ADMIN — Medication 20 MILLIGRAM(S): at 12:28

## 2019-01-01 RX ADMIN — Medication 1 TABLET(S): at 14:44

## 2019-01-01 RX ADMIN — ATORVASTATIN CALCIUM 40 MILLIGRAM(S): 80 TABLET, FILM COATED ORAL at 21:42

## 2019-01-01 RX ADMIN — OXYCODONE AND ACETAMINOPHEN 2 TABLET(S): 5; 325 TABLET ORAL at 20:53

## 2019-01-01 RX ADMIN — Medication 200 MILLIGRAM(S): at 06:19

## 2019-01-01 RX ADMIN — Medication 100 MILLIGRAM(S): at 05:53

## 2019-01-01 RX ADMIN — MIDAZOLAM HYDROCHLORIDE 2 MILLIGRAM(S): 1 INJECTION, SOLUTION INTRAMUSCULAR; INTRAVENOUS at 14:07

## 2019-01-01 RX ADMIN — GABAPENTIN 800 MILLIGRAM(S): 400 CAPSULE ORAL at 22:27

## 2019-01-01 RX ADMIN — Medication 10 MILLIGRAM(S): at 13:04

## 2019-01-01 RX ADMIN — SODIUM CHLORIDE 500 MILLILITER(S): 9 INJECTION INTRAMUSCULAR; INTRAVENOUS; SUBCUTANEOUS at 16:16

## 2019-01-01 RX ADMIN — OXYCODONE HYDROCHLORIDE 10 MILLIGRAM(S): 5 TABLET ORAL at 14:31

## 2019-01-01 RX ADMIN — PANTOPRAZOLE SODIUM 40 MILLIGRAM(S): 20 TABLET, DELAYED RELEASE ORAL at 05:10

## 2019-01-01 RX ADMIN — Medication 1 DROP(S): at 22:01

## 2019-01-01 RX ADMIN — Medication 1 DROP(S): at 12:27

## 2019-01-01 RX ADMIN — CARBAMIDE PEROXIDE 10 DROP(S): 81.86 SOLUTION/ DROPS AURICULAR (OTIC) at 21:34

## 2019-01-01 RX ADMIN — PANTOPRAZOLE SODIUM 40 MILLIGRAM(S): 20 TABLET, DELAYED RELEASE ORAL at 17:02

## 2019-01-01 RX ADMIN — LIDOCAINE 1 PATCH: 4 CREAM TOPICAL at 19:31

## 2019-01-01 RX ADMIN — Medication 20 MILLIGRAM(S): at 17:15

## 2019-01-01 RX ADMIN — ATORVASTATIN CALCIUM 40 MILLIGRAM(S): 80 TABLET, FILM COATED ORAL at 21:39

## 2019-01-01 RX ADMIN — VALACYCLOVIR 1000 MILLIGRAM(S): 500 TABLET, FILM COATED ORAL at 06:20

## 2019-01-01 RX ADMIN — HYDROMORPHONE HYDROCHLORIDE 0.5 MILLIGRAM(S): 2 INJECTION INTRAMUSCULAR; INTRAVENOUS; SUBCUTANEOUS at 13:15

## 2019-01-01 RX ADMIN — SODIUM CHLORIDE 90 MILLILITER(S): 9 INJECTION, SOLUTION INTRAVENOUS at 05:11

## 2019-01-01 RX ADMIN — MEROPENEM 100 MILLIGRAM(S): 1 INJECTION INTRAVENOUS at 15:29

## 2019-01-01 RX ADMIN — Medication 1 DROP(S): at 02:50

## 2019-01-01 RX ADMIN — Medication 1 DROP(S): at 18:52

## 2019-01-01 RX ADMIN — Medication 1 DROP(S): at 12:45

## 2019-01-01 RX ADMIN — CEFTRIAXONE 1000 MILLIGRAM(S): 500 INJECTION, POWDER, FOR SOLUTION INTRAMUSCULAR; INTRAVENOUS at 14:54

## 2019-01-01 RX ADMIN — MORPHINE SULFATE 2 MILLIGRAM(S): 50 CAPSULE, EXTENDED RELEASE ORAL at 15:30

## 2019-01-01 RX ADMIN — PIPERACILLIN AND TAZOBACTAM 25 GRAM(S): 4; .5 INJECTION, POWDER, LYOPHILIZED, FOR SOLUTION INTRAVENOUS at 06:21

## 2019-01-01 RX ADMIN — Medication 1 DROP(S): at 04:32

## 2019-01-01 RX ADMIN — PANTOPRAZOLE SODIUM 40 MILLIGRAM(S): 20 TABLET, DELAYED RELEASE ORAL at 20:00

## 2019-01-01 RX ADMIN — Medication 650 MILLIGRAM(S): at 04:45

## 2019-01-01 RX ADMIN — PROPOFOL 1.44 MICROGRAM(S)/KG/MIN: 10 INJECTION, EMULSION INTRAVENOUS at 19:26

## 2019-01-01 RX ADMIN — VALACYCLOVIR 1000 MILLIGRAM(S): 500 TABLET, FILM COATED ORAL at 17:21

## 2019-01-01 RX ADMIN — VALACYCLOVIR 1000 MILLIGRAM(S): 500 TABLET, FILM COATED ORAL at 22:21

## 2019-01-01 RX ADMIN — ZOLPIDEM TARTRATE 5 MILLIGRAM(S): 10 TABLET ORAL at 23:00

## 2019-01-01 RX ADMIN — Medication 1 DROP(S): at 05:15

## 2019-01-01 RX ADMIN — Medication 20 MILLIGRAM(S): at 05:55

## 2019-01-01 RX ADMIN — OXYCODONE HYDROCHLORIDE 10 MILLIGRAM(S): 5 TABLET ORAL at 21:12

## 2019-01-01 RX ADMIN — FENTANYL CITRATE 50 MICROGRAM(S): 50 INJECTION INTRAVENOUS at 17:00

## 2019-01-01 RX ADMIN — Medication 20 MILLIGRAM(S): at 18:07

## 2019-01-01 RX ADMIN — Medication 40 MILLIEQUIVALENT(S): at 18:19

## 2019-01-01 RX ADMIN — Medication 20 MILLIGRAM(S): at 06:12

## 2019-01-01 RX ADMIN — Medication 1 DROP(S): at 10:01

## 2019-01-01 RX ADMIN — Medication 1 DROP(S): at 05:10

## 2019-01-01 RX ADMIN — Medication 1 DROP(S): at 23:49

## 2019-01-01 RX ADMIN — Medication 650 MILLIGRAM(S): at 15:10

## 2019-01-01 RX ADMIN — OXYCODONE HYDROCHLORIDE 10 MILLIGRAM(S): 5 TABLET ORAL at 13:32

## 2019-01-01 RX ADMIN — Medication 650 MILLIGRAM(S): at 03:30

## 2019-01-01 RX ADMIN — Medication 1 DROP(S): at 23:36

## 2019-01-01 RX ADMIN — Medication 200 MILLIGRAM(S): at 17:27

## 2019-01-01 RX ADMIN — HYDROMORPHONE HYDROCHLORIDE 0.5 MILLIGRAM(S): 2 INJECTION INTRAMUSCULAR; INTRAVENOUS; SUBCUTANEOUS at 04:30

## 2019-01-01 RX ADMIN — Medication 1 DROP(S): at 23:27

## 2019-01-01 RX ADMIN — PANTOPRAZOLE SODIUM 40 MILLIGRAM(S): 20 TABLET, DELAYED RELEASE ORAL at 17:15

## 2019-01-01 RX ADMIN — MEROPENEM 100 MILLIGRAM(S): 1 INJECTION INTRAVENOUS at 00:00

## 2019-01-01 RX ADMIN — Medication 1 DROP(S): at 23:20

## 2019-01-01 RX ADMIN — PANTOPRAZOLE SODIUM 40 MILLIGRAM(S): 20 TABLET, DELAYED RELEASE ORAL at 17:23

## 2019-01-01 RX ADMIN — OXYCODONE AND ACETAMINOPHEN 2 TABLET(S): 5; 325 TABLET ORAL at 19:55

## 2019-01-01 RX ADMIN — PIPERACILLIN AND TAZOBACTAM 25 GRAM(S): 4; .5 INJECTION, POWDER, LYOPHILIZED, FOR SOLUTION INTRAVENOUS at 14:28

## 2019-01-01 RX ADMIN — Medication 1 DROP(S): at 11:30

## 2019-01-01 RX ADMIN — GABAPENTIN 800 MILLIGRAM(S): 400 CAPSULE ORAL at 14:27

## 2019-01-01 RX ADMIN — GABAPENTIN 600 MILLIGRAM(S): 400 CAPSULE ORAL at 14:42

## 2019-01-01 RX ADMIN — Medication 20 MILLIGRAM(S): at 17:52

## 2019-01-01 RX ADMIN — PANTOPRAZOLE SODIUM 40 MILLIGRAM(S): 20 TABLET, DELAYED RELEASE ORAL at 06:02

## 2019-01-01 RX ADMIN — ATORVASTATIN CALCIUM 40 MILLIGRAM(S): 80 TABLET, FILM COATED ORAL at 23:43

## 2019-01-01 RX ADMIN — PANTOPRAZOLE SODIUM 40 MILLIGRAM(S): 20 TABLET, DELAYED RELEASE ORAL at 15:12

## 2019-01-01 RX ADMIN — OXYCODONE HYDROCHLORIDE 10 MILLIGRAM(S): 5 TABLET ORAL at 10:17

## 2019-01-01 RX ADMIN — GABAPENTIN 600 MILLIGRAM(S): 400 CAPSULE ORAL at 21:12

## 2019-01-01 RX ADMIN — PROPOFOL 1.91 MICROGRAM(S)/KG/MIN: 10 INJECTION, EMULSION INTRAVENOUS at 16:18

## 2019-01-01 RX ADMIN — Medication 20 MILLIGRAM(S): at 22:38

## 2019-01-01 RX ADMIN — DULOXETINE HYDROCHLORIDE 30 MILLIGRAM(S): 30 CAPSULE, DELAYED RELEASE ORAL at 11:45

## 2019-01-01 RX ADMIN — Medication 20 MILLIGRAM(S): at 17:46

## 2019-01-01 RX ADMIN — Medication 400 MILLIGRAM(S): at 17:36

## 2019-01-01 RX ADMIN — GABAPENTIN 600 MILLIGRAM(S): 400 CAPSULE ORAL at 12:02

## 2019-01-01 RX ADMIN — OXYCODONE HYDROCHLORIDE 10 MILLIGRAM(S): 5 TABLET ORAL at 11:54

## 2019-01-01 RX ADMIN — MEROPENEM 100 MILLIGRAM(S): 1 INJECTION INTRAVENOUS at 21:48

## 2019-01-01 RX ADMIN — ATORVASTATIN CALCIUM 40 MILLIGRAM(S): 80 TABLET, FILM COATED ORAL at 21:35

## 2019-01-01 RX ADMIN — LIDOCAINE 1 PATCH: 4 CREAM TOPICAL at 23:49

## 2019-01-01 RX ADMIN — PANTOPRAZOLE SODIUM 40 MILLIGRAM(S): 20 TABLET, DELAYED RELEASE ORAL at 06:00

## 2019-01-01 RX ADMIN — Medication 105 MILLIGRAM(S): at 17:33

## 2019-01-01 RX ADMIN — PANTOPRAZOLE SODIUM 40 MILLIGRAM(S): 20 TABLET, DELAYED RELEASE ORAL at 05:01

## 2019-01-01 RX ADMIN — OXYCODONE HYDROCHLORIDE 10 MILLIGRAM(S): 5 TABLET ORAL at 10:04

## 2019-01-01 RX ADMIN — Medication 1 DROP(S): at 17:43

## 2019-01-01 RX ADMIN — Medication 100 MILLIGRAM(S): at 14:28

## 2019-01-01 RX ADMIN — OXYCODONE HYDROCHLORIDE 10 MILLIGRAM(S): 5 TABLET ORAL at 22:11

## 2019-01-01 RX ADMIN — Medication 200 GRAM(S): at 09:41

## 2019-01-01 RX ADMIN — ZOLPIDEM TARTRATE 5 MILLIGRAM(S): 10 TABLET ORAL at 22:08

## 2019-01-01 RX ADMIN — Medication 650 MILLIGRAM(S): at 05:15

## 2019-01-01 RX ADMIN — Medication 200 MILLIGRAM(S): at 17:02

## 2019-01-01 RX ADMIN — GABAPENTIN 600 MILLIGRAM(S): 400 CAPSULE ORAL at 22:30

## 2019-01-01 RX ADMIN — OXYCODONE HYDROCHLORIDE 10 MILLIGRAM(S): 5 TABLET ORAL at 17:13

## 2019-01-01 RX ADMIN — Medication 20 MILLIGRAM(S): at 17:23

## 2019-01-01 RX ADMIN — OXYCODONE AND ACETAMINOPHEN 2 TABLET(S): 5; 325 TABLET ORAL at 03:30

## 2019-01-01 RX ADMIN — OXYCODONE AND ACETAMINOPHEN 2 TABLET(S): 5; 325 TABLET ORAL at 05:41

## 2019-01-01 RX ADMIN — PANTOPRAZOLE SODIUM 10 MG/HR: 20 TABLET, DELAYED RELEASE ORAL at 05:44

## 2019-01-01 RX ADMIN — ZOLPIDEM TARTRATE 5 MILLIGRAM(S): 10 TABLET ORAL at 22:37

## 2019-01-01 RX ADMIN — Medication 1 DROP(S): at 09:45

## 2019-01-01 RX ADMIN — Medication 650 MILLIGRAM(S): at 11:30

## 2019-01-01 RX ADMIN — Medication 1 DROP(S): at 19:33

## 2019-01-01 RX ADMIN — Medication 100 MILLIGRAM(S): at 17:32

## 2019-01-01 RX ADMIN — LIDOCAINE 1 PATCH: 4 CREAM TOPICAL at 23:00

## 2019-01-01 RX ADMIN — ATORVASTATIN CALCIUM 40 MILLIGRAM(S): 80 TABLET, FILM COATED ORAL at 22:40

## 2019-01-01 RX ADMIN — Medication 1 DROP(S): at 23:35

## 2019-01-01 RX ADMIN — Medication 20 MILLIGRAM(S): at 12:52

## 2019-01-01 RX ADMIN — OXYCODONE HYDROCHLORIDE 10 MILLIGRAM(S): 5 TABLET ORAL at 09:10

## 2019-01-01 RX ADMIN — GABAPENTIN 800 MILLIGRAM(S): 400 CAPSULE ORAL at 23:36

## 2019-01-01 RX ADMIN — SODIUM CHLORIDE 3 MILLILITER(S): 9 INJECTION INTRAMUSCULAR; INTRAVENOUS; SUBCUTANEOUS at 06:28

## 2019-01-01 RX ADMIN — PANTOPRAZOLE SODIUM 10 MG/HR: 20 TABLET, DELAYED RELEASE ORAL at 00:24

## 2019-01-01 RX ADMIN — Medication 1 DROP(S): at 13:15

## 2019-01-01 RX ADMIN — GABAPENTIN 800 MILLIGRAM(S): 400 CAPSULE ORAL at 05:29

## 2019-01-01 RX ADMIN — Medication 1 DROP(S): at 17:41

## 2019-01-01 RX ADMIN — FLUCONAZOLE 50 MILLIGRAM(S): 150 TABLET ORAL at 18:04

## 2019-01-01 RX ADMIN — OXYCODONE HYDROCHLORIDE 10 MILLIGRAM(S): 5 TABLET ORAL at 20:00

## 2019-01-01 RX ADMIN — Medication 200 MILLIGRAM(S): at 18:04

## 2019-01-01 RX ADMIN — Medication 20 MILLIGRAM(S): at 17:24

## 2019-01-01 RX ADMIN — ATORVASTATIN CALCIUM 40 MILLIGRAM(S): 80 TABLET, FILM COATED ORAL at 21:26

## 2019-01-01 RX ADMIN — OXYCODONE HYDROCHLORIDE 10 MILLIGRAM(S): 5 TABLET ORAL at 22:18

## 2019-01-01 RX ADMIN — OXYCODONE HYDROCHLORIDE 10 MILLIGRAM(S): 5 TABLET ORAL at 14:16

## 2019-01-01 RX ADMIN — Medication 10 MILLIGRAM(S): at 14:57

## 2019-01-01 RX ADMIN — Medication 100 MILLIGRAM(S): at 14:48

## 2019-01-01 RX ADMIN — Medication 20 MILLIGRAM(S): at 15:42

## 2019-01-01 RX ADMIN — MORPHINE SULFATE 4 MILLIGRAM(S): 50 CAPSULE, EXTENDED RELEASE ORAL at 17:16

## 2019-01-01 RX ADMIN — Medication 20 MILLIGRAM(S): at 22:56

## 2019-01-01 RX ADMIN — Medication 1 DROP(S): at 08:15

## 2019-01-01 RX ADMIN — GABAPENTIN 800 MILLIGRAM(S): 400 CAPSULE ORAL at 14:11

## 2019-01-01 RX ADMIN — VALACYCLOVIR 1000 MILLIGRAM(S): 500 TABLET, FILM COATED ORAL at 12:48

## 2019-01-01 RX ADMIN — Medication 1 DROP(S): at 04:43

## 2019-01-01 RX ADMIN — VALACYCLOVIR 1000 MILLIGRAM(S): 500 TABLET, FILM COATED ORAL at 21:23

## 2019-01-01 RX ADMIN — SODIUM CHLORIDE 50 MILLILITER(S): 9 INJECTION, SOLUTION INTRAVENOUS at 00:55

## 2019-01-01 RX ADMIN — Medication 1 DROP(S): at 03:43

## 2019-01-01 RX ADMIN — Medication 1 DROP(S): at 07:17

## 2019-01-01 RX ADMIN — CHLORHEXIDINE GLUCONATE 1 APPLICATION(S): 213 SOLUTION TOPICAL at 05:20

## 2019-01-01 RX ADMIN — Medication 1 DROP(S): at 13:02

## 2019-01-01 RX ADMIN — Medication 20 MILLIGRAM(S): at 17:38

## 2019-01-01 RX ADMIN — Medication 100 MILLIEQUIVALENT(S): at 10:10

## 2019-01-01 RX ADMIN — GABAPENTIN 600 MILLIGRAM(S): 400 CAPSULE ORAL at 14:28

## 2019-01-01 RX ADMIN — LIDOCAINE 1 PATCH: 4 CREAM TOPICAL at 19:39

## 2019-01-01 RX ADMIN — PROPOFOL 1.65 MICROGRAM(S)/KG/MIN: 10 INJECTION, EMULSION INTRAVENOUS at 19:30

## 2019-01-01 RX ADMIN — Medication 20 MILLIGRAM(S): at 02:29

## 2019-01-01 RX ADMIN — Medication 1 DROP(S): at 12:44

## 2019-01-01 RX ADMIN — Medication 100 GRAM(S): at 09:02

## 2019-01-01 RX ADMIN — MORPHINE SULFATE 2 MILLIGRAM(S): 50 CAPSULE, EXTENDED RELEASE ORAL at 14:48

## 2019-01-01 RX ADMIN — DULOXETINE HYDROCHLORIDE 20 MILLIGRAM(S): 30 CAPSULE, DELAYED RELEASE ORAL at 18:21

## 2019-01-01 RX ADMIN — OXYCODONE HYDROCHLORIDE 10 MILLIGRAM(S): 5 TABLET ORAL at 21:47

## 2019-01-01 RX ADMIN — Medication 1 DROP(S): at 10:57

## 2019-01-01 RX ADMIN — Medication 200 MILLIGRAM(S): at 06:09

## 2019-01-01 RX ADMIN — DULOXETINE HYDROCHLORIDE 30 MILLIGRAM(S): 30 CAPSULE, DELAYED RELEASE ORAL at 07:55

## 2019-01-01 RX ADMIN — Medication 1 TABLET(S): at 12:05

## 2019-01-01 RX ADMIN — LIDOCAINE 1 PATCH: 4 CREAM TOPICAL at 11:51

## 2019-01-01 RX ADMIN — Medication 1 DROP(S): at 22:26

## 2019-01-01 RX ADMIN — LIDOCAINE 1 PATCH: 4 CREAM TOPICAL at 12:50

## 2019-01-01 RX ADMIN — CEFTRIAXONE 100 MILLIGRAM(S): 500 INJECTION, POWDER, FOR SOLUTION INTRAMUSCULAR; INTRAVENOUS at 22:45

## 2019-01-01 RX ADMIN — Medication 1 DROP(S): at 18:53

## 2019-01-01 RX ADMIN — ATORVASTATIN CALCIUM 40 MILLIGRAM(S): 80 TABLET, FILM COATED ORAL at 22:37

## 2019-01-01 RX ADMIN — Medication 1 DROP(S): at 19:02

## 2019-01-01 RX ADMIN — Medication 1 DROP(S): at 10:59

## 2019-01-01 RX ADMIN — PANTOPRAZOLE SODIUM 10 MG/HR: 20 TABLET, DELAYED RELEASE ORAL at 21:30

## 2019-01-01 RX ADMIN — PANTOPRAZOLE SODIUM 40 MILLIGRAM(S): 20 TABLET, DELAYED RELEASE ORAL at 05:21

## 2019-01-01 RX ADMIN — Medication 20 MILLIGRAM(S): at 06:30

## 2019-01-01 RX ADMIN — OXYCODONE HYDROCHLORIDE 10 MILLIGRAM(S): 5 TABLET ORAL at 00:04

## 2019-01-01 RX ADMIN — Medication 20 MILLIGRAM(S): at 18:48

## 2019-01-01 RX ADMIN — OXYCODONE HYDROCHLORIDE 10 MILLIGRAM(S): 5 TABLET ORAL at 22:45

## 2019-01-01 RX ADMIN — Medication 100 MILLIGRAM(S): at 17:39

## 2019-01-01 RX ADMIN — PROPOFOL 1.65 MICROGRAM(S)/KG/MIN: 10 INJECTION, EMULSION INTRAVENOUS at 10:10

## 2019-01-01 RX ADMIN — DULOXETINE HYDROCHLORIDE 30 MILLIGRAM(S): 30 CAPSULE, DELAYED RELEASE ORAL at 12:55

## 2019-01-01 RX ADMIN — OXYCODONE AND ACETAMINOPHEN 2 TABLET(S): 5; 325 TABLET ORAL at 12:56

## 2019-01-01 RX ADMIN — OXYCODONE HYDROCHLORIDE 10 MILLIGRAM(S): 5 TABLET ORAL at 23:10

## 2019-01-01 RX ADMIN — Medication 1000 MILLIGRAM(S): at 14:35

## 2019-01-01 RX ADMIN — Medication 1 DROP(S): at 01:48

## 2019-01-01 RX ADMIN — Medication 650 MILLIGRAM(S): at 05:33

## 2019-01-01 RX ADMIN — LIDOCAINE 1 PATCH: 4 CREAM TOPICAL at 23:32

## 2019-01-01 RX ADMIN — SODIUM CHLORIDE 3 MILLILITER(S): 9 INJECTION INTRAMUSCULAR; INTRAVENOUS; SUBCUTANEOUS at 06:27

## 2019-01-01 RX ADMIN — MEROPENEM 100 MILLIGRAM(S): 1 INJECTION INTRAVENOUS at 22:34

## 2019-01-01 RX ADMIN — PROPOFOL 1.91 MICROGRAM(S)/KG/MIN: 10 INJECTION, EMULSION INTRAVENOUS at 20:27

## 2019-01-01 RX ADMIN — POLYETHYLENE GLYCOL 3350 17 GRAM(S): 17 POWDER, FOR SOLUTION ORAL at 06:19

## 2019-01-01 RX ADMIN — GABAPENTIN 600 MILLIGRAM(S): 400 CAPSULE ORAL at 22:14

## 2019-01-01 RX ADMIN — GABAPENTIN 800 MILLIGRAM(S): 400 CAPSULE ORAL at 15:49

## 2019-01-01 RX ADMIN — Medication 200 MILLIGRAM(S): at 05:36

## 2019-01-01 RX ADMIN — MORPHINE SULFATE 2 MILLIGRAM(S): 50 CAPSULE, EXTENDED RELEASE ORAL at 17:02

## 2019-01-01 RX ADMIN — Medication 100 MILLIEQUIVALENT(S): at 09:16

## 2019-01-01 RX ADMIN — Medication 1 DROP(S): at 17:35

## 2019-01-01 RX ADMIN — MEROPENEM 100 MILLIGRAM(S): 1 INJECTION INTRAVENOUS at 23:22

## 2019-01-01 RX ADMIN — OXYCODONE HYDROCHLORIDE 10 MILLIGRAM(S): 5 TABLET ORAL at 03:46

## 2019-01-01 RX ADMIN — Medication 1 DROP(S): at 12:43

## 2019-01-01 RX ADMIN — Medication 20 MILLIGRAM(S): at 05:50

## 2019-01-01 RX ADMIN — SODIUM CHLORIDE 1550 MILLILITER(S): 9 INJECTION INTRAMUSCULAR; INTRAVENOUS; SUBCUTANEOUS at 19:20

## 2019-01-01 RX ADMIN — Medication 1 DROP(S): at 17:14

## 2019-01-01 RX ADMIN — Medication 1 APPLICATION(S): at 05:29

## 2019-01-01 RX ADMIN — GABAPENTIN 800 MILLIGRAM(S): 400 CAPSULE ORAL at 21:12

## 2019-01-01 RX ADMIN — OXYCODONE HYDROCHLORIDE 10 MILLIGRAM(S): 5 TABLET ORAL at 06:30

## 2019-01-01 RX ADMIN — LIDOCAINE 1 PATCH: 4 CREAM TOPICAL at 13:39

## 2019-01-01 RX ADMIN — Medication 200 MILLIGRAM(S): at 19:03

## 2019-01-01 RX ADMIN — VALACYCLOVIR 1000 MILLIGRAM(S): 500 TABLET, FILM COATED ORAL at 12:31

## 2019-01-01 RX ADMIN — OXYCODONE HYDROCHLORIDE 10 MILLIGRAM(S): 5 TABLET ORAL at 14:36

## 2019-01-01 RX ADMIN — FENTANYL CITRATE 25 MICROGRAM(S): 50 INJECTION INTRAVENOUS at 01:15

## 2019-01-01 RX ADMIN — OXYCODONE HYDROCHLORIDE 10 MILLIGRAM(S): 5 TABLET ORAL at 06:52

## 2019-01-01 RX ADMIN — Medication 1 APPLICATION(S): at 14:34

## 2019-01-01 RX ADMIN — VALACYCLOVIR 1000 MILLIGRAM(S): 500 TABLET, FILM COATED ORAL at 05:34

## 2019-01-01 RX ADMIN — DULOXETINE HYDROCHLORIDE 20 MILLIGRAM(S): 30 CAPSULE, DELAYED RELEASE ORAL at 05:30

## 2019-01-01 RX ADMIN — Medication 1 DROP(S): at 11:46

## 2019-01-01 RX ADMIN — LIDOCAINE 1 PATCH: 4 CREAM TOPICAL at 19:07

## 2019-01-01 RX ADMIN — MORPHINE SULFATE 2 MILLIGRAM(S): 50 CAPSULE, EXTENDED RELEASE ORAL at 14:12

## 2019-01-01 RX ADMIN — Medication 1 DROP(S): at 00:17

## 2019-01-01 RX ADMIN — PANTOPRAZOLE SODIUM 40 MILLIGRAM(S): 20 TABLET, DELAYED RELEASE ORAL at 05:54

## 2019-01-01 RX ADMIN — Medication 1 DROP(S): at 06:00

## 2019-01-01 RX ADMIN — DULOXETINE HYDROCHLORIDE 30 MILLIGRAM(S): 30 CAPSULE, DELAYED RELEASE ORAL at 18:46

## 2019-01-01 RX ADMIN — HYDROMORPHONE HYDROCHLORIDE 0.5 MILLIGRAM(S): 2 INJECTION INTRAMUSCULAR; INTRAVENOUS; SUBCUTANEOUS at 09:36

## 2019-01-01 RX ADMIN — Medication 200 MILLIGRAM(S): at 17:33

## 2019-01-01 RX ADMIN — CEFTRIAXONE 100 MILLIGRAM(S): 500 INJECTION, POWDER, FOR SOLUTION INTRAMUSCULAR; INTRAVENOUS at 12:54

## 2019-01-01 RX ADMIN — Medication 500 MILLIGRAM(S): at 12:13

## 2019-01-01 RX ADMIN — MORPHINE SULFATE 2 MILLIGRAM(S): 50 CAPSULE, EXTENDED RELEASE ORAL at 13:49

## 2019-01-01 RX ADMIN — Medication 100 MILLIEQUIVALENT(S): at 09:29

## 2019-01-01 RX ADMIN — MORPHINE SULFATE 2 MILLIGRAM(S): 50 CAPSULE, EXTENDED RELEASE ORAL at 01:19

## 2019-01-01 RX ADMIN — Medication 1 DROP(S): at 15:20

## 2019-01-01 RX ADMIN — ATORVASTATIN CALCIUM 40 MILLIGRAM(S): 80 TABLET, FILM COATED ORAL at 22:18

## 2019-01-01 RX ADMIN — Medication 1 DROP(S): at 23:47

## 2019-01-01 RX ADMIN — GABAPENTIN 800 MILLIGRAM(S): 400 CAPSULE ORAL at 22:37

## 2019-01-01 RX ADMIN — VALACYCLOVIR 1000 MILLIGRAM(S): 500 TABLET, FILM COATED ORAL at 06:11

## 2019-01-01 RX ADMIN — VALACYCLOVIR 1000 MILLIGRAM(S): 500 TABLET, FILM COATED ORAL at 11:51

## 2019-01-01 RX ADMIN — VALACYCLOVIR 1000 MILLIGRAM(S): 500 TABLET, FILM COATED ORAL at 22:10

## 2019-01-01 RX ADMIN — MORPHINE SULFATE 2 MILLIGRAM(S): 50 CAPSULE, EXTENDED RELEASE ORAL at 13:00

## 2019-01-01 RX ADMIN — PIPERACILLIN AND TAZOBACTAM 25 GRAM(S): 4; .5 INJECTION, POWDER, LYOPHILIZED, FOR SOLUTION INTRAVENOUS at 05:53

## 2019-01-01 RX ADMIN — MORPHINE SULFATE 2 MILLIGRAM(S): 50 CAPSULE, EXTENDED RELEASE ORAL at 17:57

## 2019-01-01 RX ADMIN — Medication 1 TABLET(S): at 12:52

## 2019-01-01 RX ADMIN — Medication 20 MILLIGRAM(S): at 05:47

## 2019-01-01 RX ADMIN — Medication 1 DROP(S): at 23:30

## 2019-01-01 RX ADMIN — Medication 100 MILLIGRAM(S): at 18:21

## 2019-01-01 RX ADMIN — VALACYCLOVIR 1000 MILLIGRAM(S): 500 TABLET, FILM COATED ORAL at 05:05

## 2019-01-01 RX ADMIN — OXYCODONE HYDROCHLORIDE 10 MILLIGRAM(S): 5 TABLET ORAL at 05:00

## 2019-01-01 RX ADMIN — Medication 1 DROP(S): at 11:16

## 2019-01-01 RX ADMIN — Medication 20 MILLIGRAM(S): at 12:00

## 2019-01-01 RX ADMIN — OXYCODONE HYDROCHLORIDE 10 MILLIGRAM(S): 5 TABLET ORAL at 05:45

## 2019-01-01 RX ADMIN — Medication 10 MILLIGRAM(S): at 06:25

## 2019-01-01 RX ADMIN — DULOXETINE HYDROCHLORIDE 20 MILLIGRAM(S): 30 CAPSULE, DELAYED RELEASE ORAL at 05:57

## 2019-01-01 RX ADMIN — Medication 650 MILLIGRAM(S): at 16:02

## 2019-01-01 RX ADMIN — DULOXETINE HYDROCHLORIDE 30 MILLIGRAM(S): 30 CAPSULE, DELAYED RELEASE ORAL at 17:34

## 2019-01-01 RX ADMIN — ATORVASTATIN CALCIUM 40 MILLIGRAM(S): 80 TABLET, FILM COATED ORAL at 22:26

## 2019-01-01 RX ADMIN — Medication 1 DROP(S): at 00:04

## 2019-01-01 RX ADMIN — GABAPENTIN 800 MILLIGRAM(S): 400 CAPSULE ORAL at 14:35

## 2019-01-01 RX ADMIN — Medication 1000 MILLIGRAM(S): at 01:00

## 2019-01-01 RX ADMIN — LIDOCAINE 1 PATCH: 4 CREAM TOPICAL at 22:30

## 2019-01-01 RX ADMIN — GABAPENTIN 800 MILLIGRAM(S): 400 CAPSULE ORAL at 21:33

## 2019-01-01 RX ADMIN — ZOLPIDEM TARTRATE 5 MILLIGRAM(S): 10 TABLET ORAL at 22:55

## 2019-01-01 RX ADMIN — Medication 1 DROP(S): at 18:27

## 2019-01-01 RX ADMIN — PANTOPRAZOLE SODIUM 40 MILLIGRAM(S): 20 TABLET, DELAYED RELEASE ORAL at 17:55

## 2019-01-01 RX ADMIN — Medication 1 DROP(S): at 12:32

## 2019-01-01 RX ADMIN — Medication 650 MILLIGRAM(S): at 14:41

## 2019-01-01 RX ADMIN — OXYCODONE HYDROCHLORIDE 10 MILLIGRAM(S): 5 TABLET ORAL at 21:23

## 2019-01-01 RX ADMIN — MEROPENEM 100 MILLIGRAM(S): 1 INJECTION INTRAVENOUS at 14:56

## 2019-01-01 RX ADMIN — Medication 1 DROP(S): at 14:57

## 2019-01-01 RX ADMIN — ZOLPIDEM TARTRATE 5 MILLIGRAM(S): 10 TABLET ORAL at 23:26

## 2019-01-01 RX ADMIN — MEROPENEM 100 MILLIGRAM(S): 1 INJECTION INTRAVENOUS at 23:36

## 2019-01-01 RX ADMIN — Medication 1 DROP(S): at 05:16

## 2019-01-01 RX ADMIN — PIPERACILLIN AND TAZOBACTAM 25 GRAM(S): 4; .5 INJECTION, POWDER, LYOPHILIZED, FOR SOLUTION INTRAVENOUS at 06:23

## 2019-01-01 RX ADMIN — GABAPENTIN 600 MILLIGRAM(S): 400 CAPSULE ORAL at 13:20

## 2019-01-01 RX ADMIN — ATORVASTATIN CALCIUM 40 MILLIGRAM(S): 80 TABLET, FILM COATED ORAL at 21:53

## 2019-01-01 RX ADMIN — MORPHINE SULFATE 2 MILLIGRAM(S): 50 CAPSULE, EXTENDED RELEASE ORAL at 22:29

## 2019-01-01 RX ADMIN — OXYCODONE HYDROCHLORIDE 10 MILLIGRAM(S): 5 TABLET ORAL at 07:47

## 2019-01-01 RX ADMIN — Medication 1 DROP(S): at 21:36

## 2019-01-01 RX ADMIN — Medication 1 DROP(S): at 20:35

## 2019-01-01 RX ADMIN — MORPHINE SULFATE 2 MILLIGRAM(S): 50 CAPSULE, EXTENDED RELEASE ORAL at 12:49

## 2019-01-01 RX ADMIN — OXYCODONE AND ACETAMINOPHEN 2 TABLET(S): 5; 325 TABLET ORAL at 06:51

## 2019-01-01 RX ADMIN — OXYCODONE HYDROCHLORIDE 10 MILLIGRAM(S): 5 TABLET ORAL at 17:20

## 2019-01-01 RX ADMIN — CHLORHEXIDINE GLUCONATE 15 MILLILITER(S): 213 SOLUTION TOPICAL at 17:54

## 2019-01-01 RX ADMIN — PANTOPRAZOLE SODIUM 40 MILLIGRAM(S): 20 TABLET, DELAYED RELEASE ORAL at 05:53

## 2019-01-01 RX ADMIN — Medication 1 DROP(S): at 06:21

## 2019-01-01 RX ADMIN — MORPHINE SULFATE 4 MILLIGRAM(S): 50 CAPSULE, EXTENDED RELEASE ORAL at 13:55

## 2019-01-01 RX ADMIN — LIDOCAINE 1 PATCH: 4 CREAM TOPICAL at 19:32

## 2019-01-01 RX ADMIN — PROPOFOL 1.65 MICROGRAM(S)/KG/MIN: 10 INJECTION, EMULSION INTRAVENOUS at 07:16

## 2019-01-01 RX ADMIN — OXYCODONE HYDROCHLORIDE 10 MILLIGRAM(S): 5 TABLET ORAL at 04:36

## 2019-01-01 RX ADMIN — DULOXETINE HYDROCHLORIDE 30 MILLIGRAM(S): 30 CAPSULE, DELAYED RELEASE ORAL at 11:06

## 2019-01-01 RX ADMIN — LIDOCAINE 1 PATCH: 4 CREAM TOPICAL at 20:38

## 2019-01-01 RX ADMIN — Medication 1 DROP(S): at 05:19

## 2019-01-01 RX ADMIN — Medication 1 APPLICATION(S): at 17:45

## 2019-01-01 RX ADMIN — GABAPENTIN 800 MILLIGRAM(S): 400 CAPSULE ORAL at 05:54

## 2019-01-01 RX ADMIN — GABAPENTIN 600 MILLIGRAM(S): 400 CAPSULE ORAL at 22:38

## 2019-01-01 RX ADMIN — MORPHINE SULFATE 2 MILLIGRAM(S): 50 CAPSULE, EXTENDED RELEASE ORAL at 11:00

## 2019-01-01 RX ADMIN — MORPHINE SULFATE 2 MILLIGRAM(S): 50 CAPSULE, EXTENDED RELEASE ORAL at 12:04

## 2019-01-01 RX ADMIN — Medication 1 DROP(S): at 10:48

## 2019-01-01 RX ADMIN — Medication 1 DROP(S): at 06:12

## 2019-01-01 RX ADMIN — Medication 1 APPLICATION(S): at 06:11

## 2019-01-01 RX ADMIN — Medication 20 MILLIGRAM(S): at 06:04

## 2019-01-01 RX ADMIN — Medication 650 MILLIGRAM(S): at 07:20

## 2019-01-01 RX ADMIN — Medication 650 MILLIGRAM(S): at 10:48

## 2019-01-01 RX ADMIN — OXYCODONE HYDROCHLORIDE 10 MILLIGRAM(S): 5 TABLET ORAL at 09:30

## 2019-01-01 RX ADMIN — Medication 1 DROP(S): at 18:10

## 2019-01-01 RX ADMIN — DULOXETINE HYDROCHLORIDE 20 MILLIGRAM(S): 30 CAPSULE, DELAYED RELEASE ORAL at 18:53

## 2019-01-01 RX ADMIN — MORPHINE SULFATE 2 MILLIGRAM(S): 50 CAPSULE, EXTENDED RELEASE ORAL at 00:57

## 2019-01-01 RX ADMIN — OXYCODONE HYDROCHLORIDE 10 MILLIGRAM(S): 5 TABLET ORAL at 01:15

## 2019-01-01 RX ADMIN — Medication 200 MILLIGRAM(S): at 06:20

## 2019-01-01 RX ADMIN — Medication 1 DROP(S): at 22:09

## 2019-01-01 RX ADMIN — Medication 1 DROP(S): at 13:23

## 2019-01-01 RX ADMIN — Medication 10 MILLIGRAM(S): at 00:28

## 2019-01-01 RX ADMIN — ATORVASTATIN CALCIUM 40 MILLIGRAM(S): 80 TABLET, FILM COATED ORAL at 22:01

## 2019-01-01 RX ADMIN — GABAPENTIN 600 MILLIGRAM(S): 400 CAPSULE ORAL at 14:04

## 2019-01-01 RX ADMIN — Medication 500 MILLIGRAM(S): at 21:12

## 2019-01-01 RX ADMIN — Medication 1 MILLIGRAM(S): at 10:32

## 2019-01-01 RX ADMIN — OXYCODONE AND ACETAMINOPHEN 2 TABLET(S): 5; 325 TABLET ORAL at 14:00

## 2019-01-01 RX ADMIN — VALACYCLOVIR 1000 MILLIGRAM(S): 500 TABLET, FILM COATED ORAL at 05:53

## 2019-01-01 RX ADMIN — LIDOCAINE 1 PATCH: 4 CREAM TOPICAL at 23:25

## 2019-01-01 RX ADMIN — Medication 1 DROP(S): at 18:04

## 2019-01-01 RX ADMIN — Medication 1 DROP(S): at 05:06

## 2019-01-01 RX ADMIN — SODIUM CHLORIDE 3 MILLILITER(S): 9 INJECTION INTRAMUSCULAR; INTRAVENOUS; SUBCUTANEOUS at 22:56

## 2019-01-01 RX ADMIN — SODIUM CHLORIDE 2000 MILLILITER(S): 9 INJECTION INTRAMUSCULAR; INTRAVENOUS; SUBCUTANEOUS at 21:06

## 2019-01-01 RX ADMIN — PIPERACILLIN AND TAZOBACTAM 25 GRAM(S): 4; .5 INJECTION, POWDER, LYOPHILIZED, FOR SOLUTION INTRAVENOUS at 05:20

## 2019-01-01 RX ADMIN — OXYCODONE HYDROCHLORIDE 10 MILLIGRAM(S): 5 TABLET ORAL at 23:17

## 2019-01-01 RX ADMIN — Medication 1 BOTTLE: at 22:55

## 2019-01-01 RX ADMIN — Medication 2 DROP(S): at 00:14

## 2019-01-01 RX ADMIN — CHLORHEXIDINE GLUCONATE 15 MILLILITER(S): 213 SOLUTION TOPICAL at 19:40

## 2019-01-01 RX ADMIN — MORPHINE SULFATE 4 MILLIGRAM(S): 50 CAPSULE, EXTENDED RELEASE ORAL at 22:35

## 2019-01-01 RX ADMIN — Medication 1 DROP(S): at 00:00

## 2019-01-01 RX ADMIN — OXYCODONE HYDROCHLORIDE 10 MILLIGRAM(S): 5 TABLET ORAL at 04:11

## 2019-01-01 RX ADMIN — GABAPENTIN 800 MILLIGRAM(S): 400 CAPSULE ORAL at 13:17

## 2019-01-01 RX ADMIN — SODIUM CHLORIDE 75 MILLILITER(S): 9 INJECTION INTRAMUSCULAR; INTRAVENOUS; SUBCUTANEOUS at 07:57

## 2019-01-01 RX ADMIN — Medication 1 DROP(S): at 04:30

## 2019-01-01 RX ADMIN — OXYCODONE HYDROCHLORIDE 10 MILLIGRAM(S): 5 TABLET ORAL at 08:45

## 2019-01-01 RX ADMIN — PANTOPRAZOLE SODIUM 40 MILLIGRAM(S): 20 TABLET, DELAYED RELEASE ORAL at 18:06

## 2019-01-01 RX ADMIN — Medication 1 DROP(S): at 11:09

## 2019-01-01 RX ADMIN — Medication 100 GRAM(S): at 11:00

## 2019-01-01 RX ADMIN — Medication 1 EACH: at 17:31

## 2019-01-01 RX ADMIN — Medication 500 MILLIGRAM(S): at 08:21

## 2019-01-01 RX ADMIN — DULOXETINE HYDROCHLORIDE 20 MILLIGRAM(S): 30 CAPSULE, DELAYED RELEASE ORAL at 07:05

## 2019-01-01 RX ADMIN — Medication 1 DROP(S): at 11:51

## 2019-01-01 RX ADMIN — CHLORHEXIDINE GLUCONATE 1 APPLICATION(S): 213 SOLUTION TOPICAL at 05:14

## 2019-01-01 RX ADMIN — LIDOCAINE 1 PATCH: 4 CREAM TOPICAL at 01:54

## 2019-01-01 RX ADMIN — SODIUM CHLORIDE 4000 MILLILITER(S): 9 INJECTION, SOLUTION INTRAVENOUS at 19:09

## 2019-01-01 RX ADMIN — Medication 1 DROP(S): at 17:51

## 2019-01-01 RX ADMIN — GABAPENTIN 800 MILLIGRAM(S): 400 CAPSULE ORAL at 22:52

## 2019-01-01 RX ADMIN — OXYCODONE AND ACETAMINOPHEN 2 TABLET(S): 5; 325 TABLET ORAL at 21:30

## 2019-01-01 RX ADMIN — Medication 200 MILLIGRAM(S): at 06:23

## 2019-01-01 RX ADMIN — DULOXETINE HYDROCHLORIDE 30 MILLIGRAM(S): 30 CAPSULE, DELAYED RELEASE ORAL at 04:44

## 2019-01-01 RX ADMIN — Medication 3 MILLIGRAM(S): at 23:33

## 2019-01-01 RX ADMIN — LIDOCAINE 1 PATCH: 4 CREAM TOPICAL at 04:45

## 2019-01-01 RX ADMIN — Medication 20 MILLIGRAM(S): at 19:14

## 2019-01-01 RX ADMIN — GABAPENTIN 800 MILLIGRAM(S): 400 CAPSULE ORAL at 15:13

## 2019-01-01 RX ADMIN — OXYCODONE HYDROCHLORIDE 10 MILLIGRAM(S): 5 TABLET ORAL at 13:30

## 2019-01-01 RX ADMIN — SODIUM CHLORIDE 2000 MILLILITER(S): 9 INJECTION, SOLUTION INTRAVENOUS at 00:46

## 2019-01-01 RX ADMIN — ZOLPIDEM TARTRATE 5 MILLIGRAM(S): 10 TABLET ORAL at 00:00

## 2019-01-01 RX ADMIN — PANTOPRAZOLE SODIUM 40 MILLIGRAM(S): 20 TABLET, DELAYED RELEASE ORAL at 04:41

## 2019-01-01 RX ADMIN — Medication 1 DROP(S): at 07:34

## 2019-01-01 RX ADMIN — Medication 1 DROP(S): at 13:26

## 2019-01-01 RX ADMIN — Medication 1000 MILLIGRAM(S): at 05:16

## 2019-01-01 RX ADMIN — Medication 1 DROP(S): at 15:00

## 2019-01-01 RX ADMIN — PANTOPRAZOLE SODIUM 40 MILLIGRAM(S): 20 TABLET, DELAYED RELEASE ORAL at 06:43

## 2019-01-01 RX ADMIN — VALACYCLOVIR 1000 MILLIGRAM(S): 500 TABLET, FILM COATED ORAL at 15:19

## 2019-01-01 RX ADMIN — DULOXETINE HYDROCHLORIDE 20 MILLIGRAM(S): 30 CAPSULE, DELAYED RELEASE ORAL at 22:56

## 2019-01-01 RX ADMIN — GABAPENTIN 600 MILLIGRAM(S): 400 CAPSULE ORAL at 05:50

## 2019-01-01 RX ADMIN — Medication 10 MILLIGRAM(S): at 15:07

## 2019-01-01 RX ADMIN — GABAPENTIN 600 MILLIGRAM(S): 400 CAPSULE ORAL at 22:55

## 2019-01-01 RX ADMIN — CHLORHEXIDINE GLUCONATE 1 APPLICATION(S): 213 SOLUTION TOPICAL at 11:06

## 2019-01-01 RX ADMIN — Medication 1 DROP(S): at 06:51

## 2019-01-01 RX ADMIN — GABAPENTIN 600 MILLIGRAM(S): 400 CAPSULE ORAL at 22:37

## 2019-01-01 RX ADMIN — OXYCODONE HYDROCHLORIDE 10 MILLIGRAM(S): 5 TABLET ORAL at 11:59

## 2019-01-01 RX ADMIN — LIDOCAINE 1 PATCH: 4 CREAM TOPICAL at 20:12

## 2019-01-01 RX ADMIN — Medication 200 MILLIGRAM(S): at 17:34

## 2019-01-01 RX ADMIN — VALACYCLOVIR 1000 MILLIGRAM(S): 500 TABLET, FILM COATED ORAL at 13:32

## 2019-01-01 RX ADMIN — GABAPENTIN 600 MILLIGRAM(S): 400 CAPSULE ORAL at 05:46

## 2019-01-01 RX ADMIN — FENTANYL CITRATE 2.75 MICROGRAM(S)/KG/HR: 50 INJECTION INTRAVENOUS at 08:29

## 2019-01-01 RX ADMIN — DULOXETINE HYDROCHLORIDE 30 MILLIGRAM(S): 30 CAPSULE, DELAYED RELEASE ORAL at 05:54

## 2019-01-01 RX ADMIN — VALACYCLOVIR 1000 MILLIGRAM(S): 500 TABLET, FILM COATED ORAL at 15:33

## 2019-01-01 RX ADMIN — MORPHINE SULFATE 2 MILLIGRAM(S): 50 CAPSULE, EXTENDED RELEASE ORAL at 06:21

## 2019-01-01 RX ADMIN — Medication 500 MILLIGRAM(S): at 14:56

## 2019-01-01 RX ADMIN — SODIUM CHLORIDE 3 MILLILITER(S): 9 INJECTION INTRAMUSCULAR; INTRAVENOUS; SUBCUTANEOUS at 07:46

## 2019-01-01 RX ADMIN — SODIUM CHLORIDE 3 MILLILITER(S): 9 INJECTION INTRAMUSCULAR; INTRAVENOUS; SUBCUTANEOUS at 21:22

## 2019-01-01 RX ADMIN — Medication 100 MILLIGRAM(S): at 06:21

## 2019-01-01 RX ADMIN — Medication 20 MILLIGRAM(S): at 06:21

## 2019-01-01 RX ADMIN — Medication 1 DROP(S): at 20:21

## 2019-01-01 RX ADMIN — DULOXETINE HYDROCHLORIDE 30 MILLIGRAM(S): 30 CAPSULE, DELAYED RELEASE ORAL at 18:28

## 2019-01-01 RX ADMIN — Medication 1000 MILLIGRAM(S): at 05:27

## 2019-01-01 RX ADMIN — PANTOPRAZOLE SODIUM 40 MILLIGRAM(S): 20 TABLET, DELAYED RELEASE ORAL at 11:44

## 2019-01-01 RX ADMIN — OXYCODONE AND ACETAMINOPHEN 1 TABLET(S): 5; 325 TABLET ORAL at 23:48

## 2019-01-01 RX ADMIN — MORPHINE SULFATE 2 MILLIGRAM(S): 50 CAPSULE, EXTENDED RELEASE ORAL at 09:00

## 2019-01-01 RX ADMIN — DULOXETINE HYDROCHLORIDE 20 MILLIGRAM(S): 30 CAPSULE, DELAYED RELEASE ORAL at 05:50

## 2019-01-01 RX ADMIN — GABAPENTIN 800 MILLIGRAM(S): 400 CAPSULE ORAL at 05:58

## 2019-01-01 RX ADMIN — GABAPENTIN 800 MILLIGRAM(S): 400 CAPSULE ORAL at 21:50

## 2019-01-01 RX ADMIN — PANTOPRAZOLE SODIUM 40 MILLIGRAM(S): 20 TABLET, DELAYED RELEASE ORAL at 17:46

## 2019-01-01 RX ADMIN — DULOXETINE HYDROCHLORIDE 30 MILLIGRAM(S): 30 CAPSULE, DELAYED RELEASE ORAL at 06:21

## 2019-01-01 RX ADMIN — Medication 20 MILLIGRAM(S): at 13:25

## 2019-01-01 RX ADMIN — Medication 133 MILLILITER(S): at 10:30

## 2019-01-01 RX ADMIN — LIDOCAINE 1 PATCH: 4 CREAM TOPICAL at 23:30

## 2019-01-01 RX ADMIN — GABAPENTIN 800 MILLIGRAM(S): 400 CAPSULE ORAL at 12:31

## 2019-01-01 RX ADMIN — OXYCODONE HYDROCHLORIDE 10 MILLIGRAM(S): 5 TABLET ORAL at 21:33

## 2019-01-01 RX ADMIN — OXYCODONE AND ACETAMINOPHEN 2 TABLET(S): 5; 325 TABLET ORAL at 19:10

## 2019-01-01 RX ADMIN — Medication 1 DROP(S): at 11:25

## 2019-01-01 RX ADMIN — DULOXETINE HYDROCHLORIDE 20 MILLIGRAM(S): 30 CAPSULE, DELAYED RELEASE ORAL at 07:47

## 2019-01-01 RX ADMIN — DULOXETINE HYDROCHLORIDE 20 MILLIGRAM(S): 30 CAPSULE, DELAYED RELEASE ORAL at 18:28

## 2019-01-01 RX ADMIN — VALACYCLOVIR 1000 MILLIGRAM(S): 500 TABLET, FILM COATED ORAL at 07:05

## 2019-01-01 RX ADMIN — ATORVASTATIN CALCIUM 40 MILLIGRAM(S): 80 TABLET, FILM COATED ORAL at 21:43

## 2019-01-01 RX ADMIN — Medication 40 MILLIEQUIVALENT(S): at 11:16

## 2019-01-01 RX ADMIN — OXYCODONE AND ACETAMINOPHEN 1 TABLET(S): 5; 325 TABLET ORAL at 11:38

## 2019-01-01 RX ADMIN — DULOXETINE HYDROCHLORIDE 20 MILLIGRAM(S): 30 CAPSULE, DELAYED RELEASE ORAL at 19:06

## 2019-01-01 RX ADMIN — GABAPENTIN 800 MILLIGRAM(S): 400 CAPSULE ORAL at 13:26

## 2019-01-01 RX ADMIN — LIDOCAINE 1 PATCH: 4 CREAM TOPICAL at 06:32

## 2019-01-01 RX ADMIN — DULOXETINE HYDROCHLORIDE 30 MILLIGRAM(S): 30 CAPSULE, DELAYED RELEASE ORAL at 05:29

## 2019-01-01 RX ADMIN — PANTOPRAZOLE SODIUM 40 MILLIGRAM(S): 20 TABLET, DELAYED RELEASE ORAL at 06:28

## 2019-01-01 RX ADMIN — Medication 5 MILLIGRAM(S): at 05:32

## 2019-01-01 RX ADMIN — OXYCODONE HYDROCHLORIDE 10 MILLIGRAM(S): 5 TABLET ORAL at 05:36

## 2019-01-01 RX ADMIN — PANTOPRAZOLE SODIUM 40 MILLIGRAM(S): 20 TABLET, DELAYED RELEASE ORAL at 17:24

## 2019-01-01 RX ADMIN — VALACYCLOVIR 1000 MILLIGRAM(S): 500 TABLET, FILM COATED ORAL at 13:04

## 2019-01-01 RX ADMIN — LIDOCAINE 1 PATCH: 4 CREAM TOPICAL at 22:10

## 2019-01-01 RX ADMIN — VALACYCLOVIR 1000 MILLIGRAM(S): 500 TABLET, FILM COATED ORAL at 05:11

## 2019-01-01 RX ADMIN — Medication 1 DROP(S): at 12:35

## 2019-01-01 RX ADMIN — Medication 1 DROP(S): at 17:36

## 2019-01-01 RX ADMIN — FENTANYL CITRATE 2.75 MICROGRAM(S)/KG/HR: 50 INJECTION INTRAVENOUS at 00:04

## 2019-01-01 RX ADMIN — LIDOCAINE 1 PATCH: 4 CREAM TOPICAL at 20:43

## 2019-01-01 RX ADMIN — Medication 1 DROP(S): at 13:37

## 2019-01-01 RX ADMIN — Medication 500 MILLIGRAM(S): at 12:45

## 2019-01-01 RX ADMIN — FENTANYL CITRATE 25 MICROGRAM(S): 50 INJECTION INTRAVENOUS at 21:03

## 2019-01-01 RX ADMIN — Medication 500 MILLIGRAM(S): at 11:49

## 2019-01-01 RX ADMIN — Medication 1 DROP(S): at 17:45

## 2019-01-01 RX ADMIN — LIDOCAINE 1 PATCH: 4 CREAM TOPICAL at 17:01

## 2019-01-01 RX ADMIN — Medication 1 DROP(S): at 07:07

## 2019-01-01 RX ADMIN — PROPOFOL 1.44 MICROGRAM(S)/KG/MIN: 10 INJECTION, EMULSION INTRAVENOUS at 05:16

## 2019-01-01 RX ADMIN — SODIUM CHLORIDE 3 MILLILITER(S): 9 INJECTION INTRAMUSCULAR; INTRAVENOUS; SUBCUTANEOUS at 05:47

## 2019-01-01 RX ADMIN — ZOLPIDEM TARTRATE 5 MILLIGRAM(S): 10 TABLET ORAL at 23:58

## 2019-01-01 RX ADMIN — Medication 1 BOTTLE: at 11:37

## 2019-01-01 RX ADMIN — OXYCODONE HYDROCHLORIDE 10 MILLIGRAM(S): 5 TABLET ORAL at 15:57

## 2019-01-01 RX ADMIN — GABAPENTIN 600 MILLIGRAM(S): 400 CAPSULE ORAL at 05:30

## 2019-01-01 RX ADMIN — LIDOCAINE 1 PATCH: 4 CREAM TOPICAL at 11:44

## 2019-01-01 RX ADMIN — PANTOPRAZOLE SODIUM 10 MG/HR: 20 TABLET, DELAYED RELEASE ORAL at 05:27

## 2019-01-01 RX ADMIN — LIDOCAINE 1 PATCH: 4 CREAM TOPICAL at 11:37

## 2019-01-01 RX ADMIN — Medication 1 DROP(S): at 11:48

## 2019-01-01 RX ADMIN — Medication 1 DROP(S): at 17:54

## 2019-01-01 RX ADMIN — Medication 200 MILLIGRAM(S): at 05:29

## 2019-01-01 RX ADMIN — MORPHINE SULFATE 2 MILLIGRAM(S): 50 CAPSULE, EXTENDED RELEASE ORAL at 14:34

## 2019-01-01 RX ADMIN — Medication 1 DROP(S): at 05:20

## 2019-01-01 RX ADMIN — GABAPENTIN 600 MILLIGRAM(S): 400 CAPSULE ORAL at 21:23

## 2019-01-01 RX ADMIN — PANTOPRAZOLE SODIUM 40 MILLIGRAM(S): 20 TABLET, DELAYED RELEASE ORAL at 06:11

## 2019-01-01 RX ADMIN — Medication 100 MILLIEQUIVALENT(S): at 11:06

## 2019-01-01 RX ADMIN — SODIUM CHLORIDE 3 MILLILITER(S): 9 INJECTION INTRAMUSCULAR; INTRAVENOUS; SUBCUTANEOUS at 15:57

## 2019-01-01 RX ADMIN — GABAPENTIN 600 MILLIGRAM(S): 400 CAPSULE ORAL at 13:38

## 2019-01-01 RX ADMIN — MORPHINE SULFATE 2 MILLIGRAM(S): 50 CAPSULE, EXTENDED RELEASE ORAL at 22:56

## 2019-01-01 RX ADMIN — OXYCODONE HYDROCHLORIDE 10 MILLIGRAM(S): 5 TABLET ORAL at 05:01

## 2019-01-01 RX ADMIN — VALACYCLOVIR 1000 MILLIGRAM(S): 500 TABLET, FILM COATED ORAL at 05:57

## 2019-01-01 RX ADMIN — Medication 20 MILLIGRAM(S): at 06:27

## 2019-01-01 RX ADMIN — PIPERACILLIN AND TAZOBACTAM 25 GRAM(S): 4; .5 INJECTION, POWDER, LYOPHILIZED, FOR SOLUTION INTRAVENOUS at 15:24

## 2019-01-01 RX ADMIN — POLYETHYLENE GLYCOL 3350 17 GRAM(S): 17 POWDER, FOR SOLUTION ORAL at 15:37

## 2019-01-01 RX ADMIN — Medication 1 DROP(S): at 05:07

## 2019-01-01 RX ADMIN — Medication 1 TABLET(S): at 13:20

## 2019-01-01 RX ADMIN — LIDOCAINE 1 PATCH: 4 CREAM TOPICAL at 12:53

## 2019-01-01 RX ADMIN — ATORVASTATIN CALCIUM 40 MILLIGRAM(S): 80 TABLET, FILM COATED ORAL at 22:56

## 2019-01-01 RX ADMIN — Medication 1000 MILLIGRAM(S): at 13:45

## 2019-01-01 RX ADMIN — DULOXETINE HYDROCHLORIDE 20 MILLIGRAM(S): 30 CAPSULE, DELAYED RELEASE ORAL at 11:46

## 2019-01-01 RX ADMIN — Medication 650 MILLIGRAM(S): at 21:47

## 2019-01-01 RX ADMIN — PANTOPRAZOLE SODIUM 40 MILLIGRAM(S): 20 TABLET, DELAYED RELEASE ORAL at 06:10

## 2019-01-01 RX ADMIN — ATORVASTATIN CALCIUM 40 MILLIGRAM(S): 80 TABLET, FILM COATED ORAL at 21:57

## 2019-01-01 RX ADMIN — GABAPENTIN 600 MILLIGRAM(S): 400 CAPSULE ORAL at 14:27

## 2019-01-01 RX ADMIN — Medication 1 TABLET(S): at 12:12

## 2019-01-01 RX ADMIN — Medication 20 MILLIGRAM(S): at 06:19

## 2019-01-01 RX ADMIN — Medication 100 MILLIEQUIVALENT(S): at 13:10

## 2019-01-01 RX ADMIN — Medication 12.5 GRAM(S): at 07:08

## 2019-01-01 RX ADMIN — Medication 1 DROP(S): at 05:14

## 2019-01-01 RX ADMIN — LIDOCAINE 1 PATCH: 4 CREAM TOPICAL at 18:12

## 2019-01-01 RX ADMIN — Medication 1 DROP(S): at 03:49

## 2019-01-01 RX ADMIN — VALACYCLOVIR 1000 MILLIGRAM(S): 500 TABLET, FILM COATED ORAL at 22:19

## 2019-01-01 RX ADMIN — Medication 1 DROP(S): at 23:50

## 2019-01-01 RX ADMIN — GABAPENTIN 600 MILLIGRAM(S): 400 CAPSULE ORAL at 22:11

## 2019-01-01 RX ADMIN — ATORVASTATIN CALCIUM 40 MILLIGRAM(S): 80 TABLET, FILM COATED ORAL at 22:53

## 2019-01-01 RX ADMIN — LIDOCAINE 1 PATCH: 4 CREAM TOPICAL at 21:48

## 2019-01-01 RX ADMIN — PANTOPRAZOLE SODIUM 40 MILLIGRAM(S): 20 TABLET, DELAYED RELEASE ORAL at 18:14

## 2019-01-01 RX ADMIN — POLYETHYLENE GLYCOL 3350 17 GRAM(S): 17 POWDER, FOR SOLUTION ORAL at 14:00

## 2019-01-01 RX ADMIN — SODIUM CHLORIDE 3 MILLILITER(S): 9 INJECTION INTRAMUSCULAR; INTRAVENOUS; SUBCUTANEOUS at 05:05

## 2019-01-01 RX ADMIN — SODIUM CHLORIDE 1000 MILLILITER(S): 9 INJECTION INTRAMUSCULAR; INTRAVENOUS; SUBCUTANEOUS at 00:15

## 2019-01-01 RX ADMIN — Medication 1 TABLET(S): at 11:11

## 2019-01-01 RX ADMIN — MORPHINE SULFATE 2 MILLIGRAM(S): 50 CAPSULE, EXTENDED RELEASE ORAL at 05:07

## 2019-01-01 RX ADMIN — SODIUM CHLORIDE 3 MILLILITER(S): 9 INJECTION INTRAMUSCULAR; INTRAVENOUS; SUBCUTANEOUS at 06:55

## 2019-01-01 RX ADMIN — SODIUM CHLORIDE 90 MILLILITER(S): 9 INJECTION, SOLUTION INTRAVENOUS at 21:38

## 2019-01-01 RX ADMIN — PANTOPRAZOLE SODIUM 40 MILLIGRAM(S): 20 TABLET, DELAYED RELEASE ORAL at 05:57

## 2019-01-01 RX ADMIN — PANTOPRAZOLE SODIUM 10 MG/HR: 20 TABLET, DELAYED RELEASE ORAL at 11:22

## 2019-01-01 RX ADMIN — MORPHINE SULFATE 2 MILLIGRAM(S): 50 CAPSULE, EXTENDED RELEASE ORAL at 00:54

## 2019-01-01 RX ADMIN — DULOXETINE HYDROCHLORIDE 20 MILLIGRAM(S): 30 CAPSULE, DELAYED RELEASE ORAL at 05:18

## 2019-01-01 RX ADMIN — OXYCODONE HYDROCHLORIDE 10 MILLIGRAM(S): 5 TABLET ORAL at 23:06

## 2019-01-01 RX ADMIN — Medication 20 MILLIGRAM(S): at 05:28

## 2019-01-01 RX ADMIN — Medication 200 MILLIGRAM(S): at 14:00

## 2019-01-01 RX ADMIN — ZOLPIDEM TARTRATE 5 MILLIGRAM(S): 10 TABLET ORAL at 01:04

## 2019-01-01 RX ADMIN — PANTOPRAZOLE SODIUM 40 MILLIGRAM(S): 20 TABLET, DELAYED RELEASE ORAL at 05:56

## 2019-01-01 RX ADMIN — GABAPENTIN 600 MILLIGRAM(S): 400 CAPSULE ORAL at 14:00

## 2019-01-01 RX ADMIN — MORPHINE SULFATE 2 MILLIGRAM(S): 50 CAPSULE, EXTENDED RELEASE ORAL at 11:40

## 2019-01-01 RX ADMIN — MORPHINE SULFATE 2 MILLIGRAM(S): 50 CAPSULE, EXTENDED RELEASE ORAL at 18:39

## 2019-01-01 RX ADMIN — LIDOCAINE 1 PATCH: 4 CREAM TOPICAL at 12:56

## 2019-01-01 RX ADMIN — PANTOPRAZOLE SODIUM 10 MG/HR: 20 TABLET, DELAYED RELEASE ORAL at 07:48

## 2019-01-01 RX ADMIN — VALACYCLOVIR 1000 MILLIGRAM(S): 500 TABLET, FILM COATED ORAL at 22:16

## 2019-01-01 RX ADMIN — Medication 1 DROP(S): at 05:57

## 2019-01-01 RX ADMIN — OXYCODONE HYDROCHLORIDE 10 MILLIGRAM(S): 5 TABLET ORAL at 17:45

## 2019-01-01 RX ADMIN — Medication 20 MILLIGRAM(S): at 05:58

## 2019-01-01 RX ADMIN — GABAPENTIN 600 MILLIGRAM(S): 400 CAPSULE ORAL at 06:08

## 2019-01-01 RX ADMIN — Medication 650 MILLIGRAM(S): at 13:20

## 2019-01-01 RX ADMIN — DULOXETINE HYDROCHLORIDE 30 MILLIGRAM(S): 30 CAPSULE, DELAYED RELEASE ORAL at 08:43

## 2019-01-01 RX ADMIN — Medication 1 DROP(S): at 23:34

## 2019-01-01 RX ADMIN — OXYCODONE HYDROCHLORIDE 10 MILLIGRAM(S): 5 TABLET ORAL at 06:42

## 2019-01-01 RX ADMIN — GABAPENTIN 800 MILLIGRAM(S): 400 CAPSULE ORAL at 21:18

## 2019-01-01 RX ADMIN — Medication 200 MILLIGRAM(S): at 21:12

## 2019-01-01 RX ADMIN — Medication 1 DROP(S): at 18:39

## 2019-01-01 RX ADMIN — Medication 1 DROP(S): at 08:52

## 2019-01-01 RX ADMIN — DULOXETINE HYDROCHLORIDE 20 MILLIGRAM(S): 30 CAPSULE, DELAYED RELEASE ORAL at 18:14

## 2019-01-01 RX ADMIN — MORPHINE SULFATE 2 MILLIGRAM(S): 50 CAPSULE, EXTENDED RELEASE ORAL at 13:11

## 2019-01-01 RX ADMIN — Medication 1 DROP(S): at 00:29

## 2019-01-01 RX ADMIN — OXYCODONE HYDROCHLORIDE 10 MILLIGRAM(S): 5 TABLET ORAL at 13:00

## 2019-01-01 RX ADMIN — OXYCODONE HYDROCHLORIDE 10 MILLIGRAM(S): 5 TABLET ORAL at 05:17

## 2019-01-01 RX ADMIN — MORPHINE SULFATE 2 MILLIGRAM(S): 50 CAPSULE, EXTENDED RELEASE ORAL at 16:07

## 2019-01-01 RX ADMIN — MEROPENEM 100 MILLIGRAM(S): 1 INJECTION INTRAVENOUS at 05:35

## 2019-01-01 RX ADMIN — MEROPENEM 100 MILLIGRAM(S): 1 INJECTION INTRAVENOUS at 21:04

## 2019-01-01 RX ADMIN — PANTOPRAZOLE SODIUM 40 MILLIGRAM(S): 20 TABLET, DELAYED RELEASE ORAL at 05:11

## 2019-01-01 RX ADMIN — Medication 1 DROP(S): at 17:23

## 2019-01-01 RX ADMIN — Medication 20 MILLIGRAM(S): at 23:05

## 2019-01-01 RX ADMIN — LIDOCAINE 1 PATCH: 4 CREAM TOPICAL at 21:06

## 2019-01-01 RX ADMIN — Medication 20 MILLIGRAM(S): at 18:47

## 2019-01-01 RX ADMIN — OXYCODONE HYDROCHLORIDE 10 MILLIGRAM(S): 5 TABLET ORAL at 07:03

## 2019-01-01 RX ADMIN — SODIUM CHLORIDE 1000 MILLILITER(S): 9 INJECTION INTRAMUSCULAR; INTRAVENOUS; SUBCUTANEOUS at 09:56

## 2019-01-01 RX ADMIN — PIPERACILLIN AND TAZOBACTAM 25 GRAM(S): 4; .5 INJECTION, POWDER, LYOPHILIZED, FOR SOLUTION INTRAVENOUS at 22:37

## 2019-01-01 RX ADMIN — Medication 200 MILLIGRAM(S): at 05:00

## 2019-01-01 RX ADMIN — DULOXETINE HYDROCHLORIDE 30 MILLIGRAM(S): 30 CAPSULE, DELAYED RELEASE ORAL at 08:49

## 2019-01-01 RX ADMIN — VALACYCLOVIR 1000 MILLIGRAM(S): 500 TABLET, FILM COATED ORAL at 21:56

## 2019-01-01 RX ADMIN — GABAPENTIN 800 MILLIGRAM(S): 400 CAPSULE ORAL at 06:43

## 2019-01-01 RX ADMIN — Medication 1 DROP(S): at 05:36

## 2019-01-01 RX ADMIN — Medication 1 DROP(S): at 11:59

## 2019-01-01 RX ADMIN — DULOXETINE HYDROCHLORIDE 30 MILLIGRAM(S): 30 CAPSULE, DELAYED RELEASE ORAL at 06:30

## 2019-01-01 RX ADMIN — Medication 1 DROP(S): at 05:52

## 2019-01-01 RX ADMIN — Medication 40 MILLIEQUIVALENT(S): at 11:21

## 2019-01-01 RX ADMIN — VALACYCLOVIR 1000 MILLIGRAM(S): 500 TABLET, FILM COATED ORAL at 11:16

## 2019-01-01 RX ADMIN — CHLORHEXIDINE GLUCONATE 15 MILLILITER(S): 213 SOLUTION TOPICAL at 18:22

## 2019-01-01 RX ADMIN — VALACYCLOVIR 1000 MILLIGRAM(S): 500 TABLET, FILM COATED ORAL at 21:20

## 2019-01-01 RX ADMIN — Medication 200 MILLIGRAM(S): at 06:53

## 2019-01-01 RX ADMIN — MORPHINE SULFATE 2 MILLIGRAM(S): 50 CAPSULE, EXTENDED RELEASE ORAL at 13:06

## 2019-01-01 RX ADMIN — PANTOPRAZOLE SODIUM 40 MILLIGRAM(S): 20 TABLET, DELAYED RELEASE ORAL at 17:16

## 2019-01-01 RX ADMIN — SODIUM CHLORIDE 3 MILLILITER(S): 9 INJECTION INTRAMUSCULAR; INTRAVENOUS; SUBCUTANEOUS at 13:28

## 2019-01-01 RX ADMIN — OXYCODONE HYDROCHLORIDE 10 MILLIGRAM(S): 5 TABLET ORAL at 09:37

## 2019-01-01 RX ADMIN — Medication 1 DROP(S): at 23:53

## 2019-01-01 RX ADMIN — Medication 650 MILLIGRAM(S): at 15:29

## 2019-01-01 RX ADMIN — VALACYCLOVIR 1000 MILLIGRAM(S): 500 TABLET, FILM COATED ORAL at 05:29

## 2019-01-01 RX ADMIN — PANTOPRAZOLE SODIUM 40 MILLIGRAM(S): 20 TABLET, DELAYED RELEASE ORAL at 18:12

## 2019-01-01 RX ADMIN — GABAPENTIN 800 MILLIGRAM(S): 400 CAPSULE ORAL at 21:29

## 2019-01-01 RX ADMIN — SODIUM CHLORIDE 100 MILLILITER(S): 9 INJECTION INTRAMUSCULAR; INTRAVENOUS; SUBCUTANEOUS at 20:00

## 2019-01-01 RX ADMIN — AMLODIPINE BESYLATE 5 MILLIGRAM(S): 2.5 TABLET ORAL at 06:26

## 2019-01-01 RX ADMIN — OXYCODONE HYDROCHLORIDE 10 MILLIGRAM(S): 5 TABLET ORAL at 19:03

## 2019-01-01 RX ADMIN — Medication 650 MILLIGRAM(S): at 12:48

## 2019-01-01 RX ADMIN — OXYCODONE HYDROCHLORIDE 10 MILLIGRAM(S): 5 TABLET ORAL at 21:20

## 2019-01-01 RX ADMIN — Medication 1 DROP(S): at 06:18

## 2019-01-01 RX ADMIN — FENTANYL CITRATE 50 MICROGRAM(S): 50 INJECTION INTRAVENOUS at 12:45

## 2019-01-01 RX ADMIN — MEROPENEM 100 MILLIGRAM(S): 1 INJECTION INTRAVENOUS at 05:03

## 2019-01-01 RX ADMIN — ATORVASTATIN CALCIUM 40 MILLIGRAM(S): 80 TABLET, FILM COATED ORAL at 21:33

## 2019-01-01 RX ADMIN — Medication 1 DROP(S): at 05:02

## 2019-01-01 RX ADMIN — Medication 1 DROP(S): at 00:07

## 2019-01-01 RX ADMIN — Medication 500 MILLIGRAM(S): at 01:13

## 2019-01-01 RX ADMIN — OXYCODONE AND ACETAMINOPHEN 2 TABLET(S): 5; 325 TABLET ORAL at 17:21

## 2019-01-01 RX ADMIN — DULOXETINE HYDROCHLORIDE 30 MILLIGRAM(S): 30 CAPSULE, DELAYED RELEASE ORAL at 17:25

## 2019-01-01 RX ADMIN — SODIUM CHLORIDE 3 MILLILITER(S): 9 INJECTION INTRAMUSCULAR; INTRAVENOUS; SUBCUTANEOUS at 22:31

## 2019-01-01 RX ADMIN — ZOLPIDEM TARTRATE 5 MILLIGRAM(S): 10 TABLET ORAL at 23:50

## 2019-01-01 RX ADMIN — LIDOCAINE 1 PATCH: 4 CREAM TOPICAL at 19:00

## 2019-01-01 RX ADMIN — HYDROMORPHONE HYDROCHLORIDE 1 MILLIGRAM(S): 2 INJECTION INTRAMUSCULAR; INTRAVENOUS; SUBCUTANEOUS at 05:37

## 2019-01-01 RX ADMIN — Medication 2 TABLET(S): at 17:52

## 2019-01-01 RX ADMIN — Medication 1 APPLICATION(S): at 17:33

## 2019-01-01 RX ADMIN — MORPHINE SULFATE 2 MILLIGRAM(S): 50 CAPSULE, EXTENDED RELEASE ORAL at 14:30

## 2019-01-01 RX ADMIN — PROPOFOL 1.91 MICROGRAM(S)/KG/MIN: 10 INJECTION, EMULSION INTRAVENOUS at 02:03

## 2019-01-01 RX ADMIN — GABAPENTIN 600 MILLIGRAM(S): 400 CAPSULE ORAL at 14:57

## 2019-01-01 RX ADMIN — VALACYCLOVIR 1000 MILLIGRAM(S): 500 TABLET, FILM COATED ORAL at 06:00

## 2019-01-01 RX ADMIN — OXYCODONE HYDROCHLORIDE 10 MILLIGRAM(S): 5 TABLET ORAL at 14:41

## 2019-01-01 RX ADMIN — LIDOCAINE 2 PATCH: 4 CREAM TOPICAL at 22:50

## 2019-01-01 RX ADMIN — GABAPENTIN 800 MILLIGRAM(S): 400 CAPSULE ORAL at 13:14

## 2019-01-01 RX ADMIN — Medication 1 DROP(S): at 18:21

## 2019-01-01 RX ADMIN — LIDOCAINE 1 PATCH: 4 CREAM TOPICAL at 07:43

## 2019-01-01 RX ADMIN — Medication 100 MILLIGRAM(S): at 17:03

## 2019-01-01 RX ADMIN — VALACYCLOVIR 1000 MILLIGRAM(S): 500 TABLET, FILM COATED ORAL at 11:25

## 2019-01-01 RX ADMIN — VALACYCLOVIR 1000 MILLIGRAM(S): 500 TABLET, FILM COATED ORAL at 12:56

## 2019-01-01 RX ADMIN — Medication 1 TABLET(S): at 11:54

## 2019-01-01 RX ADMIN — AMLODIPINE BESYLATE 5 MILLIGRAM(S): 2.5 TABLET ORAL at 08:28

## 2019-01-01 RX ADMIN — Medication 1 DROP(S): at 01:45

## 2019-01-01 RX ADMIN — GABAPENTIN 800 MILLIGRAM(S): 400 CAPSULE ORAL at 22:08

## 2019-01-01 RX ADMIN — OXYCODONE HYDROCHLORIDE 10 MILLIGRAM(S): 5 TABLET ORAL at 20:44

## 2019-01-01 RX ADMIN — MORPHINE SULFATE 2 MILLIGRAM(S): 50 CAPSULE, EXTENDED RELEASE ORAL at 13:32

## 2019-01-01 RX ADMIN — Medication 500 MILLIGRAM(S): at 05:49

## 2019-01-01 RX ADMIN — VALACYCLOVIR 1000 MILLIGRAM(S): 500 TABLET, FILM COATED ORAL at 14:31

## 2019-01-01 RX ADMIN — GABAPENTIN 600 MILLIGRAM(S): 400 CAPSULE ORAL at 21:34

## 2019-01-01 RX ADMIN — MORPHINE SULFATE 2 MILLIGRAM(S): 50 CAPSULE, EXTENDED RELEASE ORAL at 15:04

## 2019-01-01 RX ADMIN — MORPHINE SULFATE 2 MILLIGRAM(S): 50 CAPSULE, EXTENDED RELEASE ORAL at 15:56

## 2019-01-01 RX ADMIN — Medication 1 DROP(S): at 11:47

## 2019-01-01 RX ADMIN — POLYETHYLENE GLYCOL 3350 17 GRAM(S): 17 POWDER, FOR SOLUTION ORAL at 12:30

## 2019-01-01 RX ADMIN — MEROPENEM 100 MILLIGRAM(S): 1 INJECTION INTRAVENOUS at 13:40

## 2019-01-01 RX ADMIN — VALACYCLOVIR 1000 MILLIGRAM(S): 500 TABLET, FILM COATED ORAL at 23:34

## 2019-01-01 RX ADMIN — Medication 1 DROP(S): at 17:15

## 2019-01-01 RX ADMIN — Medication 1 DROP(S): at 22:16

## 2019-01-01 RX ADMIN — Medication 1 DROP(S): at 18:14

## 2019-01-01 RX ADMIN — VALACYCLOVIR 1000 MILLIGRAM(S): 500 TABLET, FILM COATED ORAL at 05:25

## 2019-01-01 RX ADMIN — VALACYCLOVIR 1000 MILLIGRAM(S): 500 TABLET, FILM COATED ORAL at 21:44

## 2019-01-01 RX ADMIN — Medication 2 DROP(S): at 17:58

## 2019-01-01 RX ADMIN — VALACYCLOVIR 1000 MILLIGRAM(S): 500 TABLET, FILM COATED ORAL at 22:01

## 2019-01-01 RX ADMIN — ZOLPIDEM TARTRATE 5 MILLIGRAM(S): 10 TABLET ORAL at 23:52

## 2019-01-01 RX ADMIN — GABAPENTIN 800 MILLIGRAM(S): 400 CAPSULE ORAL at 21:55

## 2019-01-01 RX ADMIN — Medication 1 BOTTLE: at 12:26

## 2019-01-01 RX ADMIN — Medication 1 APPLICATION(S): at 12:19

## 2019-01-01 RX ADMIN — GABAPENTIN 600 MILLIGRAM(S): 400 CAPSULE ORAL at 08:28

## 2019-01-01 RX ADMIN — OXYCODONE HYDROCHLORIDE 10 MILLIGRAM(S): 5 TABLET ORAL at 12:08

## 2019-01-01 RX ADMIN — MORPHINE SULFATE 2 MILLIGRAM(S): 50 CAPSULE, EXTENDED RELEASE ORAL at 09:06

## 2019-01-01 RX ADMIN — Medication 400 MILLIGRAM(S): at 12:29

## 2019-01-01 RX ADMIN — ATORVASTATIN CALCIUM 40 MILLIGRAM(S): 80 TABLET, FILM COATED ORAL at 21:49

## 2019-01-01 RX ADMIN — Medication 1 DROP(S): at 20:34

## 2019-01-01 RX ADMIN — MORPHINE SULFATE 4 MILLIGRAM(S): 50 CAPSULE, EXTENDED RELEASE ORAL at 20:01

## 2019-01-01 RX ADMIN — Medication 20 MILLIGRAM(S): at 06:43

## 2019-01-01 RX ADMIN — SODIUM CHLORIDE 3 MILLILITER(S): 9 INJECTION INTRAMUSCULAR; INTRAVENOUS; SUBCUTANEOUS at 22:28

## 2019-01-01 RX ADMIN — LIDOCAINE 1 PATCH: 4 CREAM TOPICAL at 17:58

## 2019-01-01 RX ADMIN — Medication 100 MILLIGRAM(S): at 21:19

## 2019-01-01 RX ADMIN — GABAPENTIN 600 MILLIGRAM(S): 400 CAPSULE ORAL at 13:31

## 2019-01-01 RX ADMIN — OXYCODONE HYDROCHLORIDE 10 MILLIGRAM(S): 5 TABLET ORAL at 21:26

## 2019-01-01 RX ADMIN — SODIUM CHLORIDE 3 MILLILITER(S): 9 INJECTION INTRAMUSCULAR; INTRAVENOUS; SUBCUTANEOUS at 05:12

## 2019-01-01 RX ADMIN — VALACYCLOVIR 1000 MILLIGRAM(S): 500 TABLET, FILM COATED ORAL at 06:22

## 2019-01-01 RX ADMIN — DULOXETINE HYDROCHLORIDE 30 MILLIGRAM(S): 30 CAPSULE, DELAYED RELEASE ORAL at 06:01

## 2019-01-01 RX ADMIN — SODIUM CHLORIDE 3 MILLILITER(S): 9 INJECTION INTRAMUSCULAR; INTRAVENOUS; SUBCUTANEOUS at 21:10

## 2019-01-01 RX ADMIN — DULOXETINE HYDROCHLORIDE 20 MILLIGRAM(S): 30 CAPSULE, DELAYED RELEASE ORAL at 17:06

## 2019-01-01 RX ADMIN — VALACYCLOVIR 1000 MILLIGRAM(S): 500 TABLET, FILM COATED ORAL at 11:36

## 2019-01-01 RX ADMIN — Medication 1 DROP(S): at 00:48

## 2019-01-01 RX ADMIN — LIDOCAINE 1 PATCH: 4 CREAM TOPICAL at 12:32

## 2019-01-01 RX ADMIN — Medication 500 MILLIGRAM(S): at 12:06

## 2019-01-01 RX ADMIN — Medication 1 DROP(S): at 02:29

## 2019-01-01 RX ADMIN — CHLORHEXIDINE GLUCONATE 15 MILLILITER(S): 213 SOLUTION TOPICAL at 05:26

## 2019-01-01 RX ADMIN — DULOXETINE HYDROCHLORIDE 30 MILLIGRAM(S): 30 CAPSULE, DELAYED RELEASE ORAL at 17:03

## 2019-01-01 RX ADMIN — OXYCODONE HYDROCHLORIDE 10 MILLIGRAM(S): 5 TABLET ORAL at 04:31

## 2019-01-01 RX ADMIN — Medication 1 DROP(S): at 22:37

## 2019-01-01 RX ADMIN — PANTOPRAZOLE SODIUM 40 MILLIGRAM(S): 20 TABLET, DELAYED RELEASE ORAL at 06:32

## 2019-01-01 RX ADMIN — VALACYCLOVIR 1000 MILLIGRAM(S): 500 TABLET, FILM COATED ORAL at 05:47

## 2019-01-01 RX ADMIN — DULOXETINE HYDROCHLORIDE 20 MILLIGRAM(S): 30 CAPSULE, DELAYED RELEASE ORAL at 05:05

## 2019-01-01 RX ADMIN — SODIUM CHLORIDE 3 MILLILITER(S): 9 INJECTION INTRAMUSCULAR; INTRAVENOUS; SUBCUTANEOUS at 05:06

## 2019-01-01 RX ADMIN — SODIUM CHLORIDE 1000 MILLILITER(S): 9 INJECTION INTRAMUSCULAR; INTRAVENOUS; SUBCUTANEOUS at 21:43

## 2019-01-01 RX ADMIN — MORPHINE SULFATE 2 MILLIGRAM(S): 50 CAPSULE, EXTENDED RELEASE ORAL at 11:51

## 2019-01-01 RX ADMIN — DULOXETINE HYDROCHLORIDE 20 MILLIGRAM(S): 30 CAPSULE, DELAYED RELEASE ORAL at 18:32

## 2019-01-01 RX ADMIN — VALACYCLOVIR 1000 MILLIGRAM(S): 500 TABLET, FILM COATED ORAL at 05:52

## 2019-01-01 RX ADMIN — SODIUM CHLORIDE 3 MILLILITER(S): 9 INJECTION INTRAMUSCULAR; INTRAVENOUS; SUBCUTANEOUS at 16:10

## 2019-01-01 RX ADMIN — Medication 1 DROP(S): at 23:12

## 2019-01-01 RX ADMIN — Medication 1 DROP(S): at 17:34

## 2019-01-01 RX ADMIN — Medication 1 DROP(S): at 02:19

## 2019-01-01 RX ADMIN — DULOXETINE HYDROCHLORIDE 20 MILLIGRAM(S): 30 CAPSULE, DELAYED RELEASE ORAL at 05:20

## 2019-01-01 RX ADMIN — PIPERACILLIN AND TAZOBACTAM 25 GRAM(S): 4; .5 INJECTION, POWDER, LYOPHILIZED, FOR SOLUTION INTRAVENOUS at 21:39

## 2019-01-01 RX ADMIN — Medication 20 MILLIGRAM(S): at 21:18

## 2019-01-01 RX ADMIN — Medication 650 MILLIGRAM(S): at 10:09

## 2019-01-01 RX ADMIN — OXYCODONE HYDROCHLORIDE 5 MILLIGRAM(S): 5 TABLET ORAL at 15:59

## 2019-01-01 RX ADMIN — Medication 1 DROP(S): at 15:35

## 2019-01-01 RX ADMIN — OXYCODONE HYDROCHLORIDE 10 MILLIGRAM(S): 5 TABLET ORAL at 22:09

## 2019-01-01 RX ADMIN — HYDROMORPHONE HYDROCHLORIDE 0.5 MILLIGRAM(S): 2 INJECTION INTRAMUSCULAR; INTRAVENOUS; SUBCUTANEOUS at 14:50

## 2019-01-01 RX ADMIN — Medication 100 MILLIEQUIVALENT(S): at 10:55

## 2019-01-01 RX ADMIN — Medication 1 DROP(S): at 05:47

## 2019-01-01 RX ADMIN — Medication 100 MILLIEQUIVALENT(S): at 12:22

## 2019-01-01 RX ADMIN — OXYCODONE HYDROCHLORIDE 10 MILLIGRAM(S): 5 TABLET ORAL at 22:30

## 2019-01-01 RX ADMIN — Medication 1 DROP(S): at 10:22

## 2019-01-01 RX ADMIN — Medication 200 MILLIGRAM(S): at 18:56

## 2019-01-01 RX ADMIN — Medication 1 DROP(S): at 17:40

## 2019-01-01 RX ADMIN — PANTOPRAZOLE SODIUM 40 MILLIGRAM(S): 20 TABLET, DELAYED RELEASE ORAL at 18:42

## 2019-01-01 RX ADMIN — PANTOPRAZOLE SODIUM 40 MILLIGRAM(S): 20 TABLET, DELAYED RELEASE ORAL at 18:40

## 2019-01-01 RX ADMIN — DULOXETINE HYDROCHLORIDE 20 MILLIGRAM(S): 30 CAPSULE, DELAYED RELEASE ORAL at 17:07

## 2019-01-01 RX ADMIN — PANTOPRAZOLE SODIUM 40 MILLIGRAM(S): 20 TABLET, DELAYED RELEASE ORAL at 17:30

## 2019-01-01 RX ADMIN — Medication 1 DROP(S): at 19:01

## 2019-01-01 RX ADMIN — LIDOCAINE 1 PATCH: 4 CREAM TOPICAL at 11:21

## 2019-01-01 RX ADMIN — Medication 500 MILLIGRAM(S): at 13:11

## 2019-01-01 RX ADMIN — PIPERACILLIN AND TAZOBACTAM 200 GRAM(S): 4; .5 INJECTION, POWDER, LYOPHILIZED, FOR SOLUTION INTRAVENOUS at 20:37

## 2019-01-01 RX ADMIN — Medication 20 MILLIGRAM(S): at 17:51

## 2019-01-01 RX ADMIN — Medication 1 DROP(S): at 11:31

## 2019-01-01 RX ADMIN — MEROPENEM 100 MILLIGRAM(S): 1 INJECTION INTRAVENOUS at 16:36

## 2019-01-01 RX ADMIN — Medication 20 MILLIGRAM(S): at 12:45

## 2019-01-01 RX ADMIN — PANTOPRAZOLE SODIUM 40 MILLIGRAM(S): 20 TABLET, DELAYED RELEASE ORAL at 17:14

## 2019-01-01 RX ADMIN — OXYCODONE AND ACETAMINOPHEN 2 TABLET(S): 5; 325 TABLET ORAL at 11:56

## 2019-01-01 RX ADMIN — Medication 1 DROP(S): at 18:32

## 2019-01-01 RX ADMIN — Medication 650 MILLIGRAM(S): at 18:29

## 2019-01-01 RX ADMIN — OXYCODONE HYDROCHLORIDE 10 MILLIGRAM(S): 5 TABLET ORAL at 09:19

## 2019-01-01 RX ADMIN — Medication 650 MILLIGRAM(S): at 12:30

## 2019-01-01 RX ADMIN — MORPHINE SULFATE 2 MILLIGRAM(S): 50 CAPSULE, EXTENDED RELEASE ORAL at 17:41

## 2019-01-01 RX ADMIN — Medication 20 MILLIGRAM(S): at 18:05

## 2019-01-01 RX ADMIN — VALACYCLOVIR 1000 MILLIGRAM(S): 500 TABLET, FILM COATED ORAL at 06:30

## 2019-01-01 RX ADMIN — OXYCODONE HYDROCHLORIDE 10 MILLIGRAM(S): 5 TABLET ORAL at 16:43

## 2019-01-01 RX ADMIN — DULOXETINE HYDROCHLORIDE 30 MILLIGRAM(S): 30 CAPSULE, DELAYED RELEASE ORAL at 17:18

## 2019-01-01 RX ADMIN — PIPERACILLIN AND TAZOBACTAM 25 GRAM(S): 4; .5 INJECTION, POWDER, LYOPHILIZED, FOR SOLUTION INTRAVENOUS at 14:10

## 2019-01-01 RX ADMIN — Medication 250 MILLIGRAM(S): at 20:26

## 2019-01-01 RX ADMIN — DULOXETINE HYDROCHLORIDE 20 MILLIGRAM(S): 30 CAPSULE, DELAYED RELEASE ORAL at 19:13

## 2019-01-01 RX ADMIN — FENTANYL CITRATE 25 MICROGRAM(S): 50 INJECTION INTRAVENOUS at 03:00

## 2019-01-01 RX ADMIN — OXYCODONE HYDROCHLORIDE 5 MILLIGRAM(S): 5 TABLET ORAL at 05:51

## 2019-01-01 RX ADMIN — MORPHINE SULFATE 4 MILLIGRAM(S): 50 CAPSULE, EXTENDED RELEASE ORAL at 19:31

## 2019-01-01 RX ADMIN — GABAPENTIN 300 MILLIGRAM(S): 400 CAPSULE ORAL at 14:16

## 2019-01-01 RX ADMIN — MORPHINE SULFATE 2 MILLIGRAM(S): 50 CAPSULE, EXTENDED RELEASE ORAL at 08:50

## 2019-01-01 RX ADMIN — PANTOPRAZOLE SODIUM 10 MG/HR: 20 TABLET, DELAYED RELEASE ORAL at 18:55

## 2019-01-01 RX ADMIN — LIDOCAINE 1 PATCH: 4 CREAM TOPICAL at 19:21

## 2019-01-01 RX ADMIN — DULOXETINE HYDROCHLORIDE 20 MILLIGRAM(S): 30 CAPSULE, DELAYED RELEASE ORAL at 12:03

## 2019-01-01 RX ADMIN — LIDOCAINE 1 PATCH: 4 CREAM TOPICAL at 03:02

## 2019-01-01 RX ADMIN — GABAPENTIN 800 MILLIGRAM(S): 400 CAPSULE ORAL at 05:27

## 2019-01-01 RX ADMIN — OXYCODONE HYDROCHLORIDE 5 MILLIGRAM(S): 5 TABLET ORAL at 11:07

## 2019-01-01 RX ADMIN — GABAPENTIN 800 MILLIGRAM(S): 400 CAPSULE ORAL at 14:01

## 2019-01-01 RX ADMIN — GABAPENTIN 800 MILLIGRAM(S): 400 CAPSULE ORAL at 20:45

## 2019-01-01 RX ADMIN — Medication 25 MILLIGRAM(S): at 18:29

## 2019-01-01 RX ADMIN — Medication 200 MILLIGRAM(S): at 06:26

## 2019-01-01 RX ADMIN — VALACYCLOVIR 1000 MILLIGRAM(S): 500 TABLET, FILM COATED ORAL at 06:35

## 2019-01-01 RX ADMIN — GABAPENTIN 800 MILLIGRAM(S): 400 CAPSULE ORAL at 22:01

## 2019-01-01 RX ADMIN — MORPHINE SULFATE 2 MILLIGRAM(S): 50 CAPSULE, EXTENDED RELEASE ORAL at 08:46

## 2019-01-01 RX ADMIN — FENTANYL CITRATE 25 MICROGRAM(S): 50 INJECTION INTRAVENOUS at 08:28

## 2019-01-01 RX ADMIN — LIDOCAINE 1 PATCH: 4 CREAM TOPICAL at 12:48

## 2019-01-01 RX ADMIN — VALACYCLOVIR 1000 MILLIGRAM(S): 500 TABLET, FILM COATED ORAL at 14:24

## 2019-01-01 RX ADMIN — Medication 1 DROP(S): at 19:27

## 2019-01-01 RX ADMIN — Medication 100 MILLIGRAM(S): at 18:48

## 2019-01-01 RX ADMIN — LIDOCAINE 1 PATCH: 4 CREAM TOPICAL at 08:49

## 2019-01-01 RX ADMIN — Medication 2 DROP(S): at 06:21

## 2019-01-01 RX ADMIN — Medication 100 MILLIGRAM(S): at 21:33

## 2019-01-01 RX ADMIN — OXYCODONE HYDROCHLORIDE 5 MILLIGRAM(S): 5 TABLET ORAL at 07:00

## 2019-01-01 RX ADMIN — OXYCODONE HYDROCHLORIDE 5 MILLIGRAM(S): 5 TABLET ORAL at 05:28

## 2019-01-01 RX ADMIN — CHLORHEXIDINE GLUCONATE 15 MILLILITER(S): 213 SOLUTION TOPICAL at 06:24

## 2019-01-01 RX ADMIN — VALACYCLOVIR 1000 MILLIGRAM(S): 500 TABLET, FILM COATED ORAL at 18:34

## 2019-01-01 RX ADMIN — MORPHINE SULFATE 2 MILLIGRAM(S): 50 CAPSULE, EXTENDED RELEASE ORAL at 17:33

## 2019-01-01 RX ADMIN — Medication 20 MILLIGRAM(S): at 21:38

## 2019-01-01 RX ADMIN — Medication 20 MILLIGRAM(S): at 05:04

## 2019-01-01 RX ADMIN — Medication 1 DROP(S): at 10:04

## 2019-01-01 RX ADMIN — ATORVASTATIN CALCIUM 40 MILLIGRAM(S): 80 TABLET, FILM COATED ORAL at 22:30

## 2019-01-01 RX ADMIN — GABAPENTIN 800 MILLIGRAM(S): 400 CAPSULE ORAL at 15:19

## 2019-01-01 RX ADMIN — Medication 20 MILLIGRAM(S): at 21:30

## 2019-01-01 RX ADMIN — CEFTRIAXONE 100 MILLIGRAM(S): 500 INJECTION, POWDER, FOR SOLUTION INTRAMUSCULAR; INTRAVENOUS at 13:22

## 2019-01-01 RX ADMIN — ATORVASTATIN CALCIUM 40 MILLIGRAM(S): 80 TABLET, FILM COATED ORAL at 21:20

## 2019-01-01 RX ADMIN — Medication 650 MILLIGRAM(S): at 21:17

## 2019-01-01 RX ADMIN — GABAPENTIN 800 MILLIGRAM(S): 400 CAPSULE ORAL at 13:53

## 2019-01-01 RX ADMIN — Medication 20 MILLIGRAM(S): at 07:02

## 2019-01-01 RX ADMIN — Medication 1 DROP(S): at 00:32

## 2019-01-01 RX ADMIN — GABAPENTIN 600 MILLIGRAM(S): 400 CAPSULE ORAL at 05:08

## 2019-01-01 RX ADMIN — ZOLPIDEM TARTRATE 5 MILLIGRAM(S): 10 TABLET ORAL at 00:16

## 2019-01-01 RX ADMIN — ATORVASTATIN CALCIUM 40 MILLIGRAM(S): 80 TABLET, FILM COATED ORAL at 21:36

## 2019-01-01 RX ADMIN — Medication 100 MILLIGRAM(S): at 13:18

## 2019-01-01 RX ADMIN — LIDOCAINE 1 PATCH: 4 CREAM TOPICAL at 23:23

## 2019-01-01 RX ADMIN — PANTOPRAZOLE SODIUM 40 MILLIGRAM(S): 20 TABLET, DELAYED RELEASE ORAL at 06:20

## 2019-01-01 RX ADMIN — DULOXETINE HYDROCHLORIDE 30 MILLIGRAM(S): 30 CAPSULE, DELAYED RELEASE ORAL at 05:21

## 2019-01-01 RX ADMIN — Medication 200 MILLIGRAM(S): at 06:03

## 2019-01-01 RX ADMIN — CHLORHEXIDINE GLUCONATE 1 APPLICATION(S): 213 SOLUTION TOPICAL at 05:28

## 2019-01-01 RX ADMIN — HYDROMORPHONE HYDROCHLORIDE 1 MILLIGRAM(S): 2 INJECTION INTRAMUSCULAR; INTRAVENOUS; SUBCUTANEOUS at 20:29

## 2019-01-01 RX ADMIN — LIDOCAINE 1 PATCH: 4 CREAM TOPICAL at 13:05

## 2019-01-01 RX ADMIN — FENTANYL CITRATE 50 MICROGRAM(S): 50 INJECTION INTRAVENOUS at 13:15

## 2019-01-01 RX ADMIN — VALACYCLOVIR 1000 MILLIGRAM(S): 500 TABLET, FILM COATED ORAL at 13:06

## 2019-01-01 RX ADMIN — MORPHINE SULFATE 4 MILLIGRAM(S): 50 CAPSULE, EXTENDED RELEASE ORAL at 19:27

## 2019-01-01 RX ADMIN — GABAPENTIN 300 MILLIGRAM(S): 400 CAPSULE ORAL at 05:42

## 2019-01-01 RX ADMIN — PANTOPRAZOLE SODIUM 40 MILLIGRAM(S): 20 TABLET, DELAYED RELEASE ORAL at 18:35

## 2019-01-01 RX ADMIN — Medication 2 DROP(S): at 01:14

## 2019-01-01 RX ADMIN — OXYCODONE HYDROCHLORIDE 5 MILLIGRAM(S): 5 TABLET ORAL at 06:30

## 2019-01-01 RX ADMIN — OXYCODONE HYDROCHLORIDE 10 MILLIGRAM(S): 5 TABLET ORAL at 08:00

## 2019-01-01 RX ADMIN — GABAPENTIN 800 MILLIGRAM(S): 400 CAPSULE ORAL at 06:00

## 2019-01-01 RX ADMIN — Medication 1 DROP(S): at 12:42

## 2019-01-01 RX ADMIN — SODIUM CHLORIDE 75 MILLILITER(S): 9 INJECTION, SOLUTION INTRAVENOUS at 06:38

## 2019-01-01 RX ADMIN — PANTOPRAZOLE SODIUM 10 MG/HR: 20 TABLET, DELAYED RELEASE ORAL at 20:19

## 2019-01-01 RX ADMIN — VALACYCLOVIR 1000 MILLIGRAM(S): 500 TABLET, FILM COATED ORAL at 06:49

## 2019-01-01 RX ADMIN — PANTOPRAZOLE SODIUM 40 MILLIGRAM(S): 20 TABLET, DELAYED RELEASE ORAL at 05:09

## 2019-01-01 RX ADMIN — GABAPENTIN 800 MILLIGRAM(S): 400 CAPSULE ORAL at 12:48

## 2019-01-01 RX ADMIN — Medication 1 DROP(S): at 11:57

## 2019-01-01 RX ADMIN — SODIUM CHLORIDE 75 MILLILITER(S): 9 INJECTION, SOLUTION INTRAVENOUS at 12:39

## 2019-01-01 RX ADMIN — LIDOCAINE 1 PATCH: 4 CREAM TOPICAL at 11:29

## 2019-01-01 RX ADMIN — Medication 20 MILLIGRAM(S): at 05:11

## 2019-01-01 RX ADMIN — LIDOCAINE 1 PATCH: 4 CREAM TOPICAL at 20:20

## 2019-01-01 RX ADMIN — Medication 100 MILLIEQUIVALENT(S): at 12:15

## 2019-01-01 RX ADMIN — PROPOFOL 1.44 MICROGRAM(S)/KG/MIN: 10 INJECTION, EMULSION INTRAVENOUS at 06:39

## 2019-01-01 RX ADMIN — MORPHINE SULFATE 2 MILLIGRAM(S): 50 CAPSULE, EXTENDED RELEASE ORAL at 05:44

## 2019-01-01 RX ADMIN — LIDOCAINE 1 PATCH: 4 CREAM TOPICAL at 20:19

## 2019-01-01 RX ADMIN — Medication 200 MILLIGRAM(S): at 06:30

## 2019-01-01 RX ADMIN — GABAPENTIN 600 MILLIGRAM(S): 400 CAPSULE ORAL at 05:18

## 2019-01-01 RX ADMIN — Medication 20 MILLIGRAM(S): at 20:19

## 2019-01-01 RX ADMIN — PANTOPRAZOLE SODIUM 10 MG/HR: 20 TABLET, DELAYED RELEASE ORAL at 14:59

## 2019-01-01 RX ADMIN — POLYETHYLENE GLYCOL 3350 17 GRAM(S): 17 POWDER, FOR SOLUTION ORAL at 17:48

## 2019-01-01 RX ADMIN — Medication 1 DROP(S): at 11:39

## 2019-01-01 RX ADMIN — Medication 10 MILLIGRAM(S): at 19:25

## 2019-01-01 RX ADMIN — PANTOPRAZOLE SODIUM 40 MILLIGRAM(S): 20 TABLET, DELAYED RELEASE ORAL at 05:41

## 2019-01-01 RX ADMIN — LIDOCAINE 1 PATCH: 4 CREAM TOPICAL at 19:14

## 2019-01-01 RX ADMIN — Medication 1 APPLICATION(S): at 17:43

## 2019-01-01 RX ADMIN — Medication 200 MILLIGRAM(S): at 04:32

## 2019-01-01 RX ADMIN — Medication 20 MILLIGRAM(S): at 05:08

## 2019-01-01 RX ADMIN — OXYCODONE HYDROCHLORIDE 10 MILLIGRAM(S): 5 TABLET ORAL at 00:15

## 2019-01-01 RX ADMIN — Medication 20 MILLIGRAM(S): at 12:13

## 2019-01-01 RX ADMIN — SODIUM CHLORIDE 3 MILLILITER(S): 9 INJECTION INTRAMUSCULAR; INTRAVENOUS; SUBCUTANEOUS at 05:17

## 2019-01-01 RX ADMIN — PIPERACILLIN AND TAZOBACTAM 25 GRAM(S): 4; .5 INJECTION, POWDER, LYOPHILIZED, FOR SOLUTION INTRAVENOUS at 06:31

## 2019-01-01 RX ADMIN — PANTOPRAZOLE SODIUM 40 MILLIGRAM(S): 20 TABLET, DELAYED RELEASE ORAL at 17:39

## 2019-01-01 RX ADMIN — MORPHINE SULFATE 2 MILLIGRAM(S): 50 CAPSULE, EXTENDED RELEASE ORAL at 10:02

## 2019-01-01 RX ADMIN — Medication 4000 MILLILITER(S): at 17:37

## 2019-01-01 RX ADMIN — Medication 1 DROP(S): at 14:45

## 2019-01-01 RX ADMIN — VALACYCLOVIR 1000 MILLIGRAM(S): 500 TABLET, FILM COATED ORAL at 22:08

## 2019-01-01 RX ADMIN — OXYCODONE AND ACETAMINOPHEN 2 TABLET(S): 5; 325 TABLET ORAL at 14:05

## 2019-01-01 RX ADMIN — PANTOPRAZOLE SODIUM 40 MILLIGRAM(S): 20 TABLET, DELAYED RELEASE ORAL at 17:58

## 2019-01-01 RX ADMIN — VALACYCLOVIR 1000 MILLIGRAM(S): 500 TABLET, FILM COATED ORAL at 05:20

## 2019-01-01 RX ADMIN — Medication 1 DROP(S): at 05:55

## 2019-01-01 RX ADMIN — Medication 2 DROP(S): at 13:05

## 2019-01-01 RX ADMIN — DULOXETINE HYDROCHLORIDE 30 MILLIGRAM(S): 30 CAPSULE, DELAYED RELEASE ORAL at 08:17

## 2019-01-01 RX ADMIN — DULOXETINE HYDROCHLORIDE 30 MILLIGRAM(S): 30 CAPSULE, DELAYED RELEASE ORAL at 06:19

## 2019-01-01 RX ADMIN — OXYCODONE HYDROCHLORIDE 10 MILLIGRAM(S): 5 TABLET ORAL at 20:50

## 2019-01-01 RX ADMIN — ATORVASTATIN CALCIUM 40 MILLIGRAM(S): 80 TABLET, FILM COATED ORAL at 21:02

## 2019-01-01 RX ADMIN — LIDOCAINE 1 PATCH: 4 CREAM TOPICAL at 01:37

## 2019-01-01 RX ADMIN — DULOXETINE HYDROCHLORIDE 20 MILLIGRAM(S): 30 CAPSULE, DELAYED RELEASE ORAL at 06:09

## 2019-01-01 RX ADMIN — OXYCODONE HYDROCHLORIDE 10 MILLIGRAM(S): 5 TABLET ORAL at 07:42

## 2019-01-01 RX ADMIN — Medication 1000 MILLIGRAM(S): at 16:00

## 2019-01-01 RX ADMIN — DULOXETINE HYDROCHLORIDE 30 MILLIGRAM(S): 30 CAPSULE, DELAYED RELEASE ORAL at 04:32

## 2019-01-01 RX ADMIN — Medication 100 MILLIGRAM(S): at 06:09

## 2019-01-01 RX ADMIN — MORPHINE SULFATE 2 MILLIGRAM(S): 50 CAPSULE, EXTENDED RELEASE ORAL at 12:03

## 2019-01-01 RX ADMIN — GABAPENTIN 800 MILLIGRAM(S): 400 CAPSULE ORAL at 13:01

## 2019-01-01 RX ADMIN — Medication 20 MILLIGRAM(S): at 06:09

## 2019-01-01 RX ADMIN — Medication 1 DROP(S): at 06:36

## 2019-01-01 RX ADMIN — Medication 1 DROP(S): at 00:35

## 2019-01-01 RX ADMIN — Medication 650 MILLIGRAM(S): at 19:00

## 2019-01-01 RX ADMIN — GABAPENTIN 800 MILLIGRAM(S): 400 CAPSULE ORAL at 13:38

## 2019-01-01 RX ADMIN — VALACYCLOVIR 1000 MILLIGRAM(S): 500 TABLET, FILM COATED ORAL at 22:53

## 2019-01-01 RX ADMIN — GABAPENTIN 800 MILLIGRAM(S): 400 CAPSULE ORAL at 22:55

## 2019-01-01 RX ADMIN — AMLODIPINE BESYLATE 5 MILLIGRAM(S): 2.5 TABLET ORAL at 05:50

## 2019-01-01 RX ADMIN — Medication 1 DROP(S): at 21:55

## 2019-01-01 RX ADMIN — GABAPENTIN 600 MILLIGRAM(S): 400 CAPSULE ORAL at 05:28

## 2019-01-01 RX ADMIN — OXYCODONE HYDROCHLORIDE 10 MILLIGRAM(S): 5 TABLET ORAL at 14:45

## 2019-01-01 RX ADMIN — VALACYCLOVIR 1000 MILLIGRAM(S): 500 TABLET, FILM COATED ORAL at 06:21

## 2019-01-01 RX ADMIN — ZOLPIDEM TARTRATE 5 MILLIGRAM(S): 10 TABLET ORAL at 23:49

## 2019-01-01 RX ADMIN — GABAPENTIN 600 MILLIGRAM(S): 400 CAPSULE ORAL at 14:35

## 2019-01-01 RX ADMIN — ZOLPIDEM TARTRATE 5 MILLIGRAM(S): 10 TABLET ORAL at 22:58

## 2019-01-01 RX ADMIN — Medication 2 DROP(S): at 12:49

## 2019-01-01 RX ADMIN — PANTOPRAZOLE SODIUM 40 MILLIGRAM(S): 20 TABLET, DELAYED RELEASE ORAL at 05:55

## 2019-01-01 RX ADMIN — PIPERACILLIN AND TAZOBACTAM 25 GRAM(S): 4; .5 INJECTION, POWDER, LYOPHILIZED, FOR SOLUTION INTRAVENOUS at 05:18

## 2019-01-01 RX ADMIN — CHLORHEXIDINE GLUCONATE 1 APPLICATION(S): 213 SOLUTION TOPICAL at 12:40

## 2019-01-01 RX ADMIN — SODIUM CHLORIDE 1000 MILLILITER(S): 9 INJECTION, SOLUTION INTRAVENOUS at 20:25

## 2019-01-01 RX ADMIN — Medication 1000 MILLIGRAM(S): at 14:43

## 2019-01-01 RX ADMIN — Medication 1 TABLET(S): at 11:43

## 2019-01-01 RX ADMIN — LIDOCAINE 1 PATCH: 4 CREAM TOPICAL at 11:46

## 2019-01-01 RX ADMIN — Medication 1 DROP(S): at 17:24

## 2019-01-01 RX ADMIN — VALACYCLOVIR 1000 MILLIGRAM(S): 500 TABLET, FILM COATED ORAL at 13:28

## 2019-01-01 RX ADMIN — Medication 20 MILLIGRAM(S): at 05:52

## 2019-01-01 RX ADMIN — CHLORHEXIDINE GLUCONATE 1 APPLICATION(S): 213 SOLUTION TOPICAL at 10:49

## 2019-01-01 RX ADMIN — Medication 1 DROP(S): at 12:28

## 2019-01-01 RX ADMIN — LIDOCAINE 1 PATCH: 4 CREAM TOPICAL at 16:17

## 2019-01-01 RX ADMIN — OXYCODONE HYDROCHLORIDE 5 MILLIGRAM(S): 5 TABLET ORAL at 06:08

## 2019-01-01 RX ADMIN — Medication 20 MILLIGRAM(S): at 17:02

## 2019-01-01 RX ADMIN — GABAPENTIN 600 MILLIGRAM(S): 400 CAPSULE ORAL at 06:29

## 2019-01-01 RX ADMIN — CHLORHEXIDINE GLUCONATE 1 APPLICATION(S): 213 SOLUTION TOPICAL at 05:17

## 2019-01-01 RX ADMIN — Medication 2 DROP(S): at 12:30

## 2019-01-01 RX ADMIN — Medication 1 DROP(S): at 19:32

## 2019-01-01 RX ADMIN — DULOXETINE HYDROCHLORIDE 20 MILLIGRAM(S): 30 CAPSULE, DELAYED RELEASE ORAL at 06:49

## 2019-01-01 RX ADMIN — LIDOCAINE 1 PATCH: 4 CREAM TOPICAL at 06:24

## 2019-01-01 RX ADMIN — Medication 40 MILLIEQUIVALENT(S): at 09:00

## 2019-01-01 RX ADMIN — PANTOPRAZOLE SODIUM 40 MILLIGRAM(S): 20 TABLET, DELAYED RELEASE ORAL at 13:06

## 2019-01-01 RX ADMIN — PIPERACILLIN AND TAZOBACTAM 200 GRAM(S): 4; .5 INJECTION, POWDER, LYOPHILIZED, FOR SOLUTION INTRAVENOUS at 01:48

## 2019-01-01 RX ADMIN — Medication 1 DROP(S): at 17:55

## 2019-01-01 RX ADMIN — VALACYCLOVIR 1000 MILLIGRAM(S): 500 TABLET, FILM COATED ORAL at 21:59

## 2019-01-01 RX ADMIN — LIDOCAINE 1 PATCH: 4 CREAM TOPICAL at 00:56

## 2019-01-01 RX ADMIN — Medication 650 MILLIGRAM(S): at 21:05

## 2019-01-01 RX ADMIN — CHLORHEXIDINE GLUCONATE 1 APPLICATION(S): 213 SOLUTION TOPICAL at 06:02

## 2019-01-01 RX ADMIN — Medication 1 DROP(S): at 21:19

## 2019-01-01 RX ADMIN — Medication 200 MILLIGRAM(S): at 06:17

## 2019-01-01 RX ADMIN — Medication 62.5 MILLIMOLE(S): at 13:04

## 2019-01-01 RX ADMIN — GABAPENTIN 800 MILLIGRAM(S): 400 CAPSULE ORAL at 21:43

## 2019-01-01 RX ADMIN — MEROPENEM 100 MILLIGRAM(S): 1 INJECTION INTRAVENOUS at 13:37

## 2019-01-01 RX ADMIN — GABAPENTIN 600 MILLIGRAM(S): 400 CAPSULE ORAL at 13:19

## 2019-01-01 RX ADMIN — PIPERACILLIN AND TAZOBACTAM 25 GRAM(S): 4; .5 INJECTION, POWDER, LYOPHILIZED, FOR SOLUTION INTRAVENOUS at 13:26

## 2019-01-01 RX ADMIN — DULOXETINE HYDROCHLORIDE 20 MILLIGRAM(S): 30 CAPSULE, DELAYED RELEASE ORAL at 18:01

## 2019-01-01 RX ADMIN — Medication 20 MILLIGRAM(S): at 23:36

## 2019-01-01 RX ADMIN — Medication 400 MILLIGRAM(S): at 22:32

## 2019-01-01 RX ADMIN — MORPHINE SULFATE 2 MILLIGRAM(S): 50 CAPSULE, EXTENDED RELEASE ORAL at 17:30

## 2019-01-01 RX ADMIN — Medication 20 MILLIGRAM(S): at 21:23

## 2019-01-01 RX ADMIN — Medication 1 DROP(S): at 13:53

## 2019-01-01 RX ADMIN — Medication 125 MILLILITER(S): at 21:37

## 2019-01-01 RX ADMIN — FENTANYL CITRATE 50 MICROGRAM(S): 50 INJECTION INTRAVENOUS at 18:30

## 2019-01-01 RX ADMIN — GABAPENTIN 600 MILLIGRAM(S): 400 CAPSULE ORAL at 22:16

## 2019-01-01 RX ADMIN — PANTOPRAZOLE SODIUM 40 MILLIGRAM(S): 20 TABLET, DELAYED RELEASE ORAL at 11:52

## 2019-01-01 RX ADMIN — LIDOCAINE 1 PATCH: 4 CREAM TOPICAL at 21:00

## 2019-01-01 RX ADMIN — Medication 20 MILLIGRAM(S): at 23:03

## 2019-01-01 RX ADMIN — Medication 1 APPLICATION(S): at 18:11

## 2019-01-01 RX ADMIN — OXYCODONE HYDROCHLORIDE 5 MILLIGRAM(S): 5 TABLET ORAL at 07:12

## 2019-01-01 RX ADMIN — SODIUM CHLORIDE 80 MILLILITER(S): 9 INJECTION, SOLUTION INTRAVENOUS at 09:00

## 2019-01-01 RX ADMIN — Medication 200 MILLIGRAM(S): at 18:42

## 2019-01-01 RX ADMIN — OXYCODONE HYDROCHLORIDE 10 MILLIGRAM(S): 5 TABLET ORAL at 08:32

## 2019-01-01 RX ADMIN — Medication 1 DROP(S): at 06:04

## 2019-01-01 RX ADMIN — OXYCODONE AND ACETAMINOPHEN 2 TABLET(S): 5; 325 TABLET ORAL at 06:06

## 2019-01-01 RX ADMIN — Medication 2 DROP(S): at 05:43

## 2019-01-01 RX ADMIN — PANTOPRAZOLE SODIUM 40 MILLIGRAM(S): 20 TABLET, DELAYED RELEASE ORAL at 18:09

## 2019-01-01 RX ADMIN — DULOXETINE HYDROCHLORIDE 20 MILLIGRAM(S): 30 CAPSULE, DELAYED RELEASE ORAL at 17:14

## 2019-01-01 RX ADMIN — PIPERACILLIN AND TAZOBACTAM 25 GRAM(S): 4; .5 INJECTION, POWDER, LYOPHILIZED, FOR SOLUTION INTRAVENOUS at 06:01

## 2019-01-01 RX ADMIN — OXYCODONE HYDROCHLORIDE 10 MILLIGRAM(S): 5 TABLET ORAL at 22:00

## 2019-01-01 RX ADMIN — OXYCODONE AND ACETAMINOPHEN 2 TABLET(S): 5; 325 TABLET ORAL at 18:19

## 2019-01-01 RX ADMIN — Medication 1 DROP(S): at 17:19

## 2019-01-01 RX ADMIN — DULOXETINE HYDROCHLORIDE 30 MILLIGRAM(S): 30 CAPSULE, DELAYED RELEASE ORAL at 22:08

## 2019-01-01 RX ADMIN — MORPHINE SULFATE 2 MILLIGRAM(S): 50 CAPSULE, EXTENDED RELEASE ORAL at 20:00

## 2019-01-01 RX ADMIN — GABAPENTIN 800 MILLIGRAM(S): 400 CAPSULE ORAL at 06:33

## 2019-01-01 RX ADMIN — GABAPENTIN 600 MILLIGRAM(S): 400 CAPSULE ORAL at 14:48

## 2019-01-01 RX ADMIN — MORPHINE SULFATE 2 MILLIGRAM(S): 50 CAPSULE, EXTENDED RELEASE ORAL at 00:38

## 2019-01-01 RX ADMIN — OXYCODONE HYDROCHLORIDE 10 MILLIGRAM(S): 5 TABLET ORAL at 21:49

## 2019-01-01 RX ADMIN — GABAPENTIN 800 MILLIGRAM(S): 400 CAPSULE ORAL at 22:11

## 2019-01-01 RX ADMIN — Medication 1 DROP(S): at 05:29

## 2019-01-01 RX ADMIN — ZOLPIDEM TARTRATE 5 MILLIGRAM(S): 10 TABLET ORAL at 00:45

## 2019-01-01 RX ADMIN — GABAPENTIN 800 MILLIGRAM(S): 400 CAPSULE ORAL at 13:06

## 2019-01-01 RX ADMIN — Medication 1 DROP(S): at 16:23

## 2019-01-01 RX ADMIN — GABAPENTIN 600 MILLIGRAM(S): 400 CAPSULE ORAL at 13:15

## 2019-01-01 RX ADMIN — DEXMEDETOMIDINE HYDROCHLORIDE IN 0.9% SODIUM CHLORIDE 2.75 MICROGRAM(S)/KG/HR: 4 INJECTION INTRAVENOUS at 03:25

## 2019-01-01 RX ADMIN — Medication 1 DROP(S): at 06:41

## 2019-01-01 RX ADMIN — Medication 1 DROP(S): at 06:53

## 2019-01-01 RX ADMIN — Medication 1 DROP(S): at 17:44

## 2019-01-01 RX ADMIN — Medication 1 DROP(S): at 11:03

## 2019-01-01 RX ADMIN — CHLORHEXIDINE GLUCONATE 15 MILLILITER(S): 213 SOLUTION TOPICAL at 17:24

## 2019-01-01 RX ADMIN — PANTOPRAZOLE SODIUM 40 MILLIGRAM(S): 20 TABLET, DELAYED RELEASE ORAL at 06:53

## 2019-01-01 RX ADMIN — Medication 100 MILLIEQUIVALENT(S): at 08:59

## 2019-01-01 RX ADMIN — Medication 1 APPLICATION(S): at 17:34

## 2019-01-01 RX ADMIN — Medication 200 MILLIGRAM(S): at 06:04

## 2019-01-01 RX ADMIN — Medication 125 MILLILITER(S): at 23:45

## 2019-01-01 RX ADMIN — PANTOPRAZOLE SODIUM 40 MILLIGRAM(S): 20 TABLET, DELAYED RELEASE ORAL at 06:18

## 2019-01-01 RX ADMIN — Medication 250 MILLIGRAM(S): at 01:14

## 2019-01-01 RX ADMIN — Medication 20 MILLIGRAM(S): at 17:31

## 2019-01-01 RX ADMIN — Medication 1 DROP(S): at 14:41

## 2019-01-01 RX ADMIN — LIDOCAINE 1 PATCH: 4 CREAM TOPICAL at 12:02

## 2019-01-01 RX ADMIN — Medication 650 MILLIGRAM(S): at 12:35

## 2019-01-01 RX ADMIN — OXYCODONE AND ACETAMINOPHEN 2 TABLET(S): 5; 325 TABLET ORAL at 16:57

## 2019-01-01 RX ADMIN — Medication 975 MILLIGRAM(S): at 00:30

## 2019-01-01 RX ADMIN — MORPHINE SULFATE 2 MILLIGRAM(S): 50 CAPSULE, EXTENDED RELEASE ORAL at 00:04

## 2019-01-01 RX ADMIN — Medication 200 MILLIGRAM(S): at 17:18

## 2019-01-01 RX ADMIN — HYDROMORPHONE HYDROCHLORIDE 0.5 MILLIGRAM(S): 2 INJECTION INTRAMUSCULAR; INTRAVENOUS; SUBCUTANEOUS at 14:31

## 2019-01-01 RX ADMIN — OXYCODONE AND ACETAMINOPHEN 2 TABLET(S): 5; 325 TABLET ORAL at 13:08

## 2019-01-01 RX ADMIN — PIPERACILLIN AND TAZOBACTAM 25 GRAM(S): 4; .5 INJECTION, POWDER, LYOPHILIZED, FOR SOLUTION INTRAVENOUS at 22:51

## 2019-01-01 RX ADMIN — Medication 1 DROP(S): at 06:47

## 2019-01-01 RX ADMIN — PANTOPRAZOLE SODIUM 10 MG/HR: 20 TABLET, DELAYED RELEASE ORAL at 00:48

## 2019-01-01 RX ADMIN — OXYCODONE HYDROCHLORIDE 10 MILLIGRAM(S): 5 TABLET ORAL at 15:12

## 2019-01-01 RX ADMIN — PANTOPRAZOLE SODIUM 40 MILLIGRAM(S): 20 TABLET, DELAYED RELEASE ORAL at 05:27

## 2019-01-01 RX ADMIN — Medication 650 MILLIGRAM(S): at 11:55

## 2019-01-01 RX ADMIN — SODIUM CHLORIDE 3 MILLILITER(S): 9 INJECTION INTRAMUSCULAR; INTRAVENOUS; SUBCUTANEOUS at 22:01

## 2019-01-01 RX ADMIN — Medication 200 MILLIGRAM(S): at 17:30

## 2019-01-01 RX ADMIN — Medication 1 APPLICATION(S): at 05:57

## 2019-01-01 RX ADMIN — Medication 20 MILLIGRAM(S): at 06:08

## 2019-01-01 RX ADMIN — GABAPENTIN 600 MILLIGRAM(S): 400 CAPSULE ORAL at 14:47

## 2019-01-01 RX ADMIN — VALACYCLOVIR 1000 MILLIGRAM(S): 500 TABLET, FILM COATED ORAL at 22:00

## 2019-01-01 RX ADMIN — Medication 1 DROP(S): at 11:32

## 2019-01-01 RX ADMIN — AMLODIPINE BESYLATE 5 MILLIGRAM(S): 2.5 TABLET ORAL at 05:52

## 2019-01-01 RX ADMIN — GABAPENTIN 800 MILLIGRAM(S): 400 CAPSULE ORAL at 05:51

## 2019-01-01 RX ADMIN — Medication 1 APPLICATION(S): at 11:38

## 2019-01-01 RX ADMIN — MEROPENEM 100 MILLIGRAM(S): 1 INJECTION INTRAVENOUS at 05:29

## 2019-01-01 RX ADMIN — Medication 20 MILLIGRAM(S): at 22:09

## 2019-01-01 RX ADMIN — Medication 1 DROP(S): at 06:28

## 2019-01-01 RX ADMIN — Medication 40 MILLIEQUIVALENT(S): at 08:14

## 2019-01-01 RX ADMIN — OXYCODONE HYDROCHLORIDE 10 MILLIGRAM(S): 5 TABLET ORAL at 07:20

## 2019-01-01 RX ADMIN — Medication 200 MILLIGRAM(S): at 17:12

## 2019-01-01 RX ADMIN — OXYCODONE HYDROCHLORIDE 5 MILLIGRAM(S): 5 TABLET ORAL at 06:00

## 2019-01-01 RX ADMIN — PANTOPRAZOLE SODIUM 40 MILLIGRAM(S): 20 TABLET, DELAYED RELEASE ORAL at 16:01

## 2019-01-01 RX ADMIN — Medication 1000 MILLIGRAM(S): at 22:03

## 2019-01-01 RX ADMIN — DULOXETINE HYDROCHLORIDE 20 MILLIGRAM(S): 30 CAPSULE, DELAYED RELEASE ORAL at 05:52

## 2019-01-01 RX ADMIN — Medication 1 DROP(S): at 17:38

## 2019-01-01 RX ADMIN — Medication 20 MILLIGRAM(S): at 17:30

## 2019-01-01 RX ADMIN — Medication 1 DROP(S): at 09:57

## 2019-01-01 RX ADMIN — Medication 1 DROP(S): at 11:24

## 2019-01-01 RX ADMIN — Medication 20 MILLIGRAM(S): at 05:30

## 2019-01-01 RX ADMIN — CHLORHEXIDINE GLUCONATE 15 MILLILITER(S): 213 SOLUTION TOPICAL at 05:28

## 2019-01-01 RX ADMIN — FLUCONAZOLE 50 MILLIGRAM(S): 150 TABLET ORAL at 17:02

## 2019-01-01 RX ADMIN — GABAPENTIN 600 MILLIGRAM(S): 400 CAPSULE ORAL at 13:42

## 2019-01-01 RX ADMIN — Medication 1 DROP(S): at 18:49

## 2019-01-01 RX ADMIN — OXYCODONE AND ACETAMINOPHEN 1 TABLET(S): 5; 325 TABLET ORAL at 18:35

## 2019-01-01 RX ADMIN — Medication 20 MILLIGRAM(S): at 05:56

## 2019-01-01 RX ADMIN — Medication 1000 MILLIGRAM(S): at 21:11

## 2019-01-01 RX ADMIN — Medication 1 DROP(S): at 12:20

## 2019-01-01 RX ADMIN — Medication 20 MILLIGRAM(S): at 17:18

## 2019-01-01 RX ADMIN — Medication 1 DROP(S): at 02:04

## 2019-01-01 RX ADMIN — Medication 1 DROP(S): at 05:28

## 2019-01-01 RX ADMIN — SODIUM CHLORIDE 75 MILLILITER(S): 9 INJECTION, SOLUTION INTRAVENOUS at 12:45

## 2019-01-01 RX ADMIN — PANTOPRAZOLE SODIUM 40 MILLIGRAM(S): 20 TABLET, DELAYED RELEASE ORAL at 05:07

## 2019-01-01 RX ADMIN — PANTOPRAZOLE SODIUM 40 MILLIGRAM(S): 20 TABLET, DELAYED RELEASE ORAL at 17:01

## 2019-01-01 RX ADMIN — Medication 20 MILLIGRAM(S): at 06:47

## 2019-01-01 RX ADMIN — Medication 1 DROP(S): at 12:05

## 2019-01-01 RX ADMIN — Medication 1 APPLICATION(S): at 00:54

## 2019-01-01 RX ADMIN — VALACYCLOVIR 1000 MILLIGRAM(S): 500 TABLET, FILM COATED ORAL at 15:32

## 2019-01-01 RX ADMIN — Medication 1 DROP(S): at 12:55

## 2019-01-01 RX ADMIN — LIDOCAINE 1 PATCH: 4 CREAM TOPICAL at 09:46

## 2019-01-01 RX ADMIN — Medication 1 DROP(S): at 00:08

## 2019-01-01 RX ADMIN — Medication 1 DROP(S): at 00:34

## 2019-01-01 RX ADMIN — ZOLPIDEM TARTRATE 5 MILLIGRAM(S): 10 TABLET ORAL at 22:26

## 2019-01-01 RX ADMIN — OXYCODONE HYDROCHLORIDE 10 MILLIGRAM(S): 5 TABLET ORAL at 07:46

## 2019-01-01 RX ADMIN — SODIUM CHLORIDE 1000 MILLILITER(S): 9 INJECTION INTRAMUSCULAR; INTRAVENOUS; SUBCUTANEOUS at 07:18

## 2019-01-01 RX ADMIN — Medication 1 DROP(S): at 20:15

## 2019-01-01 RX ADMIN — OXYCODONE HYDROCHLORIDE 10 MILLIGRAM(S): 5 TABLET ORAL at 10:25

## 2019-01-01 RX ADMIN — PANTOPRAZOLE SODIUM 40 MILLIGRAM(S): 20 TABLET, DELAYED RELEASE ORAL at 17:40

## 2019-01-01 RX ADMIN — PANTOPRAZOLE SODIUM 40 MILLIGRAM(S): 20 TABLET, DELAYED RELEASE ORAL at 18:27

## 2019-01-01 RX ADMIN — GABAPENTIN 300 MILLIGRAM(S): 400 CAPSULE ORAL at 05:28

## 2019-01-01 RX ADMIN — PANTOPRAZOLE SODIUM 40 MILLIGRAM(S): 20 TABLET, DELAYED RELEASE ORAL at 06:21

## 2019-01-01 RX ADMIN — VALACYCLOVIR 1000 MILLIGRAM(S): 500 TABLET, FILM COATED ORAL at 18:10

## 2019-01-01 RX ADMIN — DULOXETINE HYDROCHLORIDE 30 MILLIGRAM(S): 30 CAPSULE, DELAYED RELEASE ORAL at 06:08

## 2019-01-01 RX ADMIN — Medication 100 MILLIGRAM(S): at 06:19

## 2019-01-01 RX ADMIN — GABAPENTIN 800 MILLIGRAM(S): 400 CAPSULE ORAL at 06:01

## 2019-01-01 RX ADMIN — Medication 1 DROP(S): at 13:09

## 2019-01-01 RX ADMIN — PANTOPRAZOLE SODIUM 40 MILLIGRAM(S): 20 TABLET, DELAYED RELEASE ORAL at 05:18

## 2019-01-01 RX ADMIN — OXYCODONE HYDROCHLORIDE 10 MILLIGRAM(S): 5 TABLET ORAL at 15:53

## 2019-01-01 RX ADMIN — DULOXETINE HYDROCHLORIDE 20 MILLIGRAM(S): 30 CAPSULE, DELAYED RELEASE ORAL at 19:27

## 2019-01-01 RX ADMIN — OXYCODONE HYDROCHLORIDE 10 MILLIGRAM(S): 5 TABLET ORAL at 16:04

## 2019-01-01 RX ADMIN — Medication 1 TABLET(S): at 11:49

## 2019-01-01 RX ADMIN — GABAPENTIN 600 MILLIGRAM(S): 400 CAPSULE ORAL at 06:43

## 2019-01-01 RX ADMIN — Medication 2 DROP(S): at 21:20

## 2019-01-01 RX ADMIN — Medication 1 DROP(S): at 02:32

## 2019-01-01 RX ADMIN — Medication 1 DROP(S): at 17:46

## 2019-01-01 RX ADMIN — OXYCODONE HYDROCHLORIDE 10 MILLIGRAM(S): 5 TABLET ORAL at 04:50

## 2019-01-01 RX ADMIN — Medication 20 MILLIGRAM(S): at 18:32

## 2019-01-01 RX ADMIN — OXYCODONE HYDROCHLORIDE 10 MILLIGRAM(S): 5 TABLET ORAL at 23:56

## 2019-01-01 RX ADMIN — Medication 1 DROP(S): at 17:07

## 2019-01-01 RX ADMIN — Medication 1 DROP(S): at 23:52

## 2019-01-01 RX ADMIN — PANTOPRAZOLE SODIUM 40 MILLIGRAM(S): 20 TABLET, DELAYED RELEASE ORAL at 17:38

## 2019-01-01 RX ADMIN — Medication 1 DROP(S): at 12:02

## 2019-01-01 RX ADMIN — Medication 1 DROP(S): at 21:03

## 2019-01-01 RX ADMIN — GABAPENTIN 600 MILLIGRAM(S): 400 CAPSULE ORAL at 05:13

## 2019-01-01 RX ADMIN — DULOXETINE HYDROCHLORIDE 30 MILLIGRAM(S): 30 CAPSULE, DELAYED RELEASE ORAL at 17:23

## 2019-01-01 RX ADMIN — Medication 20 MILLIGRAM(S): at 06:17

## 2019-01-01 RX ADMIN — GABAPENTIN 600 MILLIGRAM(S): 400 CAPSULE ORAL at 21:57

## 2019-01-01 RX ADMIN — OXYCODONE AND ACETAMINOPHEN 2 TABLET(S): 5; 325 TABLET ORAL at 18:00

## 2019-01-01 RX ADMIN — Medication 1 TABLET(S): at 11:40

## 2019-01-01 RX ADMIN — Medication 1 DROP(S): at 18:54

## 2019-01-01 RX ADMIN — ATORVASTATIN CALCIUM 40 MILLIGRAM(S): 80 TABLET, FILM COATED ORAL at 22:44

## 2019-01-01 RX ADMIN — LIDOCAINE 1 PATCH: 4 CREAM TOPICAL at 12:14

## 2019-01-01 RX ADMIN — DULOXETINE HYDROCHLORIDE 20 MILLIGRAM(S): 30 CAPSULE, DELAYED RELEASE ORAL at 11:22

## 2019-01-01 RX ADMIN — ZOLPIDEM TARTRATE 5 MILLIGRAM(S): 10 TABLET ORAL at 01:18

## 2019-01-01 RX ADMIN — Medication 1 DROP(S): at 08:17

## 2019-01-01 RX ADMIN — VALACYCLOVIR 1000 MILLIGRAM(S): 500 TABLET, FILM COATED ORAL at 22:18

## 2019-01-01 RX ADMIN — GABAPENTIN 800 MILLIGRAM(S): 400 CAPSULE ORAL at 13:29

## 2019-01-01 RX ADMIN — DULOXETINE HYDROCHLORIDE 30 MILLIGRAM(S): 30 CAPSULE, DELAYED RELEASE ORAL at 21:29

## 2019-01-01 RX ADMIN — HYDROMORPHONE HYDROCHLORIDE 0.5 MILLIGRAM(S): 2 INJECTION INTRAMUSCULAR; INTRAVENOUS; SUBCUTANEOUS at 03:15

## 2019-01-01 RX ADMIN — VALACYCLOVIR 1000 MILLIGRAM(S): 500 TABLET, FILM COATED ORAL at 14:18

## 2019-01-01 RX ADMIN — OXYCODONE HYDROCHLORIDE 10 MILLIGRAM(S): 5 TABLET ORAL at 23:05

## 2019-01-01 RX ADMIN — Medication 650 MILLIGRAM(S): at 12:01

## 2019-01-01 RX ADMIN — GABAPENTIN 800 MILLIGRAM(S): 400 CAPSULE ORAL at 05:42

## 2019-01-01 RX ADMIN — GABAPENTIN 600 MILLIGRAM(S): 400 CAPSULE ORAL at 13:45

## 2019-01-01 RX ADMIN — GABAPENTIN 800 MILLIGRAM(S): 400 CAPSULE ORAL at 16:21

## 2019-01-01 RX ADMIN — OXYCODONE AND ACETAMINOPHEN 2 TABLET(S): 5; 325 TABLET ORAL at 21:04

## 2019-01-01 RX ADMIN — Medication 1 DROP(S): at 15:42

## 2019-01-01 RX ADMIN — Medication 1 DROP(S): at 06:40

## 2019-01-01 RX ADMIN — Medication 20 MILLIGRAM(S): at 06:54

## 2019-01-01 RX ADMIN — MORPHINE SULFATE 2 MILLIGRAM(S): 50 CAPSULE, EXTENDED RELEASE ORAL at 16:30

## 2019-01-01 RX ADMIN — DULOXETINE HYDROCHLORIDE 20 MILLIGRAM(S): 30 CAPSULE, DELAYED RELEASE ORAL at 05:21

## 2019-01-01 RX ADMIN — Medication 1 DROP(S): at 20:46

## 2019-01-01 RX ADMIN — FENTANYL CITRATE 50 MICROGRAM(S): 50 INJECTION INTRAVENOUS at 05:54

## 2019-01-01 RX ADMIN — Medication 1 TABLET(S): at 13:31

## 2019-01-01 RX ADMIN — MORPHINE SULFATE 2 MILLIGRAM(S): 50 CAPSULE, EXTENDED RELEASE ORAL at 18:30

## 2019-01-01 RX ADMIN — MORPHINE SULFATE 2 MILLIGRAM(S): 50 CAPSULE, EXTENDED RELEASE ORAL at 18:08

## 2019-01-01 RX ADMIN — LIDOCAINE 1 PATCH: 4 CREAM TOPICAL at 01:03

## 2019-01-01 RX ADMIN — LIDOCAINE 1 PATCH: 4 CREAM TOPICAL at 19:36

## 2019-01-01 RX ADMIN — Medication 400 MILLIGRAM(S): at 01:35

## 2019-01-01 RX ADMIN — VALACYCLOVIR 1000 MILLIGRAM(S): 500 TABLET, FILM COATED ORAL at 11:37

## 2019-01-01 RX ADMIN — DULOXETINE HYDROCHLORIDE 20 MILLIGRAM(S): 30 CAPSULE, DELAYED RELEASE ORAL at 18:09

## 2019-01-01 RX ADMIN — Medication 1 DROP(S): at 18:16

## 2019-01-01 RX ADMIN — GABAPENTIN 600 MILLIGRAM(S): 400 CAPSULE ORAL at 05:20

## 2019-01-01 RX ADMIN — Medication 20 MILLIGRAM(S): at 04:32

## 2019-01-01 RX ADMIN — SODIUM CHLORIDE 1000 MILLILITER(S): 9 INJECTION INTRAMUSCULAR; INTRAVENOUS; SUBCUTANEOUS at 17:15

## 2019-01-01 RX ADMIN — Medication 5.16 MICROGRAM(S)/KG/MIN: at 20:00

## 2019-01-01 RX ADMIN — SODIUM CHLORIDE 3 MILLILITER(S): 9 INJECTION INTRAMUSCULAR; INTRAVENOUS; SUBCUTANEOUS at 05:19

## 2019-01-01 RX ADMIN — SODIUM CHLORIDE 75 MILLILITER(S): 9 INJECTION, SOLUTION INTRAVENOUS at 11:08

## 2019-01-01 RX ADMIN — SODIUM CHLORIDE 1000 MILLILITER(S): 9 INJECTION INTRAMUSCULAR; INTRAVENOUS; SUBCUTANEOUS at 03:06

## 2019-01-01 RX ADMIN — SODIUM CHLORIDE 3 MILLILITER(S): 9 INJECTION INTRAMUSCULAR; INTRAVENOUS; SUBCUTANEOUS at 06:53

## 2019-01-01 RX ADMIN — VALACYCLOVIR 500 MILLIGRAM(S): 500 TABLET, FILM COATED ORAL at 16:00

## 2019-01-01 RX ADMIN — VALACYCLOVIR 1000 MILLIGRAM(S): 500 TABLET, FILM COATED ORAL at 22:56

## 2019-01-01 RX ADMIN — Medication 1 DROP(S): at 02:45

## 2019-01-01 RX ADMIN — Medication 650 MILLIGRAM(S): at 02:30

## 2019-01-01 RX ADMIN — ATORVASTATIN CALCIUM 40 MILLIGRAM(S): 80 TABLET, FILM COATED ORAL at 21:55

## 2019-01-01 RX ADMIN — Medication 1 DROP(S): at 06:49

## 2019-01-01 RX ADMIN — MORPHINE SULFATE 2 MILLIGRAM(S): 50 CAPSULE, EXTENDED RELEASE ORAL at 09:11

## 2019-01-01 RX ADMIN — Medication 100 MILLIGRAM(S): at 05:57

## 2019-01-01 RX ADMIN — LIDOCAINE 1 PATCH: 4 CREAM TOPICAL at 20:24

## 2019-01-01 RX ADMIN — DULOXETINE HYDROCHLORIDE 20 MILLIGRAM(S): 30 CAPSULE, DELAYED RELEASE ORAL at 05:41

## 2019-01-01 RX ADMIN — MORPHINE SULFATE 2 MILLIGRAM(S): 50 CAPSULE, EXTENDED RELEASE ORAL at 10:52

## 2019-01-01 RX ADMIN — DULOXETINE HYDROCHLORIDE 30 MILLIGRAM(S): 30 CAPSULE, DELAYED RELEASE ORAL at 05:42

## 2019-01-01 RX ADMIN — Medication 200 MILLIGRAM(S): at 22:15

## 2019-01-01 RX ADMIN — Medication 1 DROP(S): at 07:56

## 2019-01-01 RX ADMIN — CHLORHEXIDINE GLUCONATE 15 MILLILITER(S): 213 SOLUTION TOPICAL at 17:12

## 2019-01-01 RX ADMIN — Medication 20 MILLIGRAM(S): at 06:44

## 2019-01-01 RX ADMIN — LIDOCAINE 1 PATCH: 4 CREAM TOPICAL at 01:43

## 2019-01-01 RX ADMIN — Medication 1 DROP(S): at 14:01

## 2019-01-01 RX ADMIN — Medication 1 DROP(S): at 00:21

## 2019-01-01 RX ADMIN — DULOXETINE HYDROCHLORIDE 20 MILLIGRAM(S): 30 CAPSULE, DELAYED RELEASE ORAL at 17:46

## 2019-01-01 RX ADMIN — MEROPENEM 100 MILLIGRAM(S): 1 INJECTION INTRAVENOUS at 22:22

## 2019-01-01 RX ADMIN — Medication 1 DROP(S): at 17:30

## 2019-01-01 RX ADMIN — OXYCODONE HYDROCHLORIDE 5 MILLIGRAM(S): 5 TABLET ORAL at 05:52

## 2019-01-01 RX ADMIN — SODIUM CHLORIDE 3 MILLILITER(S): 9 INJECTION INTRAMUSCULAR; INTRAVENOUS; SUBCUTANEOUS at 22:00

## 2019-01-01 RX ADMIN — Medication 1 TABLET(S): at 12:16

## 2019-01-01 RX ADMIN — GABAPENTIN 600 MILLIGRAM(S): 400 CAPSULE ORAL at 21:00

## 2019-01-01 RX ADMIN — ATORVASTATIN CALCIUM 40 MILLIGRAM(S): 80 TABLET, FILM COATED ORAL at 21:21

## 2019-01-01 RX ADMIN — Medication 1 DROP(S): at 06:24

## 2019-01-01 RX ADMIN — Medication 20 MILLIGRAM(S): at 05:01

## 2019-01-01 RX ADMIN — Medication 20 MILLIGRAM(S): at 18:06

## 2019-01-01 RX ADMIN — SODIUM CHLORIDE 3 MILLILITER(S): 9 INJECTION INTRAMUSCULAR; INTRAVENOUS; SUBCUTANEOUS at 13:17

## 2019-01-01 RX ADMIN — PIPERACILLIN AND TAZOBACTAM 25 GRAM(S): 4; .5 INJECTION, POWDER, LYOPHILIZED, FOR SOLUTION INTRAVENOUS at 05:14

## 2019-01-01 RX ADMIN — DULOXETINE HYDROCHLORIDE 20 MILLIGRAM(S): 30 CAPSULE, DELAYED RELEASE ORAL at 06:40

## 2019-01-01 RX ADMIN — Medication 1 DROP(S): at 12:16

## 2019-01-01 RX ADMIN — PROPOFOL 1.65 MICROGRAM(S)/KG/MIN: 10 INJECTION, EMULSION INTRAVENOUS at 01:01

## 2019-01-01 RX ADMIN — PANTOPRAZOLE SODIUM 40 MILLIGRAM(S): 20 TABLET, DELAYED RELEASE ORAL at 18:48

## 2019-01-01 RX ADMIN — GABAPENTIN 600 MILLIGRAM(S): 400 CAPSULE ORAL at 15:02

## 2019-01-01 RX ADMIN — Medication 100 MILLIEQUIVALENT(S): at 07:15

## 2019-01-01 RX ADMIN — CEFTRIAXONE 100 MILLIGRAM(S): 500 INJECTION, POWDER, FOR SOLUTION INTRAMUSCULAR; INTRAVENOUS at 18:21

## 2019-01-01 RX ADMIN — SIMETHICONE 80 MILLIGRAM(S): 80 TABLET, CHEWABLE ORAL at 13:08

## 2019-01-01 RX ADMIN — Medication 1 DROP(S): at 05:27

## 2019-01-01 RX ADMIN — Medication 650 MILLIGRAM(S): at 08:25

## 2019-01-01 RX ADMIN — PROPOFOL 1.44 MICROGRAM(S)/KG/MIN: 10 INJECTION, EMULSION INTRAVENOUS at 19:50

## 2019-01-01 RX ADMIN — Medication 1 DROP(S): at 12:29

## 2019-01-01 RX ADMIN — VALACYCLOVIR 1000 MILLIGRAM(S): 500 TABLET, FILM COATED ORAL at 06:01

## 2019-01-01 RX ADMIN — Medication 1 DROP(S): at 06:37

## 2019-01-01 RX ADMIN — VALACYCLOVIR 1000 MILLIGRAM(S): 500 TABLET, FILM COATED ORAL at 22:02

## 2019-01-01 RX ADMIN — Medication 1 DROP(S): at 23:01

## 2019-01-01 RX ADMIN — ATORVASTATIN CALCIUM 40 MILLIGRAM(S): 80 TABLET, FILM COATED ORAL at 22:09

## 2019-01-01 RX ADMIN — DULOXETINE HYDROCHLORIDE 30 MILLIGRAM(S): 30 CAPSULE, DELAYED RELEASE ORAL at 05:04

## 2019-01-01 RX ADMIN — PANTOPRAZOLE SODIUM 40 MILLIGRAM(S): 20 TABLET, DELAYED RELEASE ORAL at 05:22

## 2019-01-01 RX ADMIN — OXYCODONE HYDROCHLORIDE 10 MILLIGRAM(S): 5 TABLET ORAL at 21:15

## 2019-01-01 RX ADMIN — DEXMEDETOMIDINE HYDROCHLORIDE IN 0.9% SODIUM CHLORIDE 2.75 MICROGRAM(S)/KG/HR: 4 INJECTION INTRAVENOUS at 17:18

## 2019-01-01 RX ADMIN — GABAPENTIN 800 MILLIGRAM(S): 400 CAPSULE ORAL at 13:39

## 2019-01-01 RX ADMIN — OXYCODONE HYDROCHLORIDE 10 MILLIGRAM(S): 5 TABLET ORAL at 13:04

## 2019-01-01 RX ADMIN — GABAPENTIN 800 MILLIGRAM(S): 400 CAPSULE ORAL at 15:33

## 2019-01-01 RX ADMIN — GABAPENTIN 600 MILLIGRAM(S): 400 CAPSULE ORAL at 13:09

## 2019-01-01 RX ADMIN — OXYCODONE HYDROCHLORIDE 10 MILLIGRAM(S): 5 TABLET ORAL at 15:41

## 2019-01-01 RX ADMIN — Medication 650 MILLIGRAM(S): at 15:40

## 2019-01-01 RX ADMIN — OXYCODONE HYDROCHLORIDE 10 MILLIGRAM(S): 5 TABLET ORAL at 05:40

## 2019-01-01 RX ADMIN — PANTOPRAZOLE SODIUM 40 MILLIGRAM(S): 20 TABLET, DELAYED RELEASE ORAL at 18:32

## 2019-01-01 RX ADMIN — MORPHINE SULFATE 2 MILLIGRAM(S): 50 CAPSULE, EXTENDED RELEASE ORAL at 10:39

## 2019-01-01 RX ADMIN — OXYCODONE HYDROCHLORIDE 10 MILLIGRAM(S): 5 TABLET ORAL at 23:13

## 2019-01-01 RX ADMIN — Medication 1 DROP(S): at 17:02

## 2019-01-01 RX ADMIN — FENTANYL CITRATE 2.75 MICROGRAM(S)/KG/HR: 50 INJECTION INTRAVENOUS at 17:33

## 2019-01-01 RX ADMIN — VALACYCLOVIR 1000 MILLIGRAM(S): 500 TABLET, FILM COATED ORAL at 21:02

## 2019-01-01 RX ADMIN — Medication 1 APPLICATION(S): at 18:34

## 2019-01-01 RX ADMIN — Medication 20 MILLIGRAM(S): at 05:09

## 2019-01-01 RX ADMIN — VALACYCLOVIR 1000 MILLIGRAM(S): 500 TABLET, FILM COATED ORAL at 05:18

## 2019-01-01 RX ADMIN — Medication 1 DROP(S): at 17:52

## 2019-01-01 RX ADMIN — Medication 1 DROP(S): at 11:26

## 2019-01-01 RX ADMIN — PIPERACILLIN AND TAZOBACTAM 25 GRAM(S): 4; .5 INJECTION, POWDER, LYOPHILIZED, FOR SOLUTION INTRAVENOUS at 21:03

## 2019-01-01 RX ADMIN — Medication 1 DROP(S): at 23:15

## 2019-01-01 RX ADMIN — OXYCODONE HYDROCHLORIDE 10 MILLIGRAM(S): 5 TABLET ORAL at 00:37

## 2019-01-01 RX ADMIN — Medication 500 MILLIGRAM(S): at 05:50

## 2019-01-01 RX ADMIN — OXYCODONE HYDROCHLORIDE 10 MILLIGRAM(S): 5 TABLET ORAL at 15:40

## 2019-01-01 RX ADMIN — ZOLPIDEM TARTRATE 5 MILLIGRAM(S): 10 TABLET ORAL at 23:28

## 2019-01-01 RX ADMIN — PANTOPRAZOLE SODIUM 40 MILLIGRAM(S): 20 TABLET, DELAYED RELEASE ORAL at 18:36

## 2019-01-01 RX ADMIN — OXYCODONE AND ACETAMINOPHEN 2 TABLET(S): 5; 325 TABLET ORAL at 21:07

## 2019-01-01 RX ADMIN — DULOXETINE HYDROCHLORIDE 20 MILLIGRAM(S): 30 CAPSULE, DELAYED RELEASE ORAL at 17:45

## 2019-01-01 RX ADMIN — Medication 1000 MILLIGRAM(S): at 13:46

## 2019-01-01 RX ADMIN — Medication 500 MILLIGRAM(S): at 06:23

## 2019-01-01 RX ADMIN — LIDOCAINE 1 PATCH: 4 CREAM TOPICAL at 02:20

## 2019-01-01 RX ADMIN — Medication 1 DROP(S): at 11:37

## 2019-01-01 RX ADMIN — Medication 2 DROP(S): at 06:02

## 2019-01-01 RX ADMIN — GABAPENTIN 800 MILLIGRAM(S): 400 CAPSULE ORAL at 16:42

## 2019-01-01 RX ADMIN — LIDOCAINE 1 PATCH: 4 CREAM TOPICAL at 17:02

## 2019-01-01 RX ADMIN — Medication 1 DROP(S): at 06:11

## 2019-01-01 RX ADMIN — OXYCODONE HYDROCHLORIDE 10 MILLIGRAM(S): 5 TABLET ORAL at 20:34

## 2019-01-01 RX ADMIN — Medication 1 DROP(S): at 20:37

## 2019-01-01 RX ADMIN — PANTOPRAZOLE SODIUM 40 MILLIGRAM(S): 20 TABLET, DELAYED RELEASE ORAL at 06:50

## 2019-01-01 RX ADMIN — Medication 10 MILLIGRAM(S): at 00:02

## 2019-01-01 RX ADMIN — Medication 1 DROP(S): at 12:04

## 2019-01-01 RX ADMIN — LIDOCAINE 1 PATCH: 4 CREAM TOPICAL at 13:12

## 2019-01-01 RX ADMIN — FENTANYL CITRATE 50 MICROGRAM(S): 50 INJECTION INTRAVENOUS at 19:33

## 2019-01-01 RX ADMIN — CHLORHEXIDINE GLUCONATE 1 APPLICATION(S): 213 SOLUTION TOPICAL at 10:51

## 2019-01-01 RX ADMIN — Medication 650 MILLIGRAM(S): at 06:54

## 2019-01-01 RX ADMIN — ATORVASTATIN CALCIUM 40 MILLIGRAM(S): 80 TABLET, FILM COATED ORAL at 21:34

## 2019-01-01 RX ADMIN — Medication 1 DROP(S): at 12:57

## 2019-01-01 RX ADMIN — Medication 1 DROP(S): at 00:27

## 2019-01-01 RX ADMIN — Medication 1 DROP(S): at 12:34

## 2019-01-01 RX ADMIN — ATORVASTATIN CALCIUM 40 MILLIGRAM(S): 80 TABLET, FILM COATED ORAL at 22:39

## 2019-01-01 RX ADMIN — Medication 20 MILLIGRAM(S): at 11:46

## 2019-01-01 RX ADMIN — Medication 25 MILLIGRAM(S): at 21:05

## 2019-01-01 RX ADMIN — SODIUM CHLORIDE 3 MILLILITER(S): 9 INJECTION INTRAMUSCULAR; INTRAVENOUS; SUBCUTANEOUS at 06:12

## 2019-01-01 RX ADMIN — ZOLPIDEM TARTRATE 5 MILLIGRAM(S): 10 TABLET ORAL at 23:20

## 2019-01-01 RX ADMIN — AMLODIPINE BESYLATE 10 MILLIGRAM(S): 2.5 TABLET ORAL at 06:49

## 2019-01-01 RX ADMIN — VALACYCLOVIR 1000 MILLIGRAM(S): 500 TABLET, FILM COATED ORAL at 14:01

## 2019-01-01 RX ADMIN — OXYCODONE AND ACETAMINOPHEN 2 TABLET(S): 5; 325 TABLET ORAL at 06:28

## 2019-01-01 RX ADMIN — OXYCODONE HYDROCHLORIDE 10 MILLIGRAM(S): 5 TABLET ORAL at 20:07

## 2019-01-01 RX ADMIN — ATORVASTATIN CALCIUM 40 MILLIGRAM(S): 80 TABLET, FILM COATED ORAL at 22:08

## 2019-01-01 RX ADMIN — Medication 1 DROP(S): at 23:06

## 2019-01-01 RX ADMIN — GABAPENTIN 800 MILLIGRAM(S): 400 CAPSULE ORAL at 06:11

## 2019-01-01 RX ADMIN — Medication 20 MILLIGRAM(S): at 18:55

## 2019-01-01 RX ADMIN — DULOXETINE HYDROCHLORIDE 30 MILLIGRAM(S): 30 CAPSULE, DELAYED RELEASE ORAL at 17:31

## 2019-01-01 RX ADMIN — Medication 10 MILLIGRAM(S): at 16:01

## 2019-01-01 RX ADMIN — Medication 1 DROP(S): at 15:10

## 2019-01-01 RX ADMIN — Medication 1 DROP(S): at 21:31

## 2019-01-01 RX ADMIN — Medication 1 DROP(S): at 21:33

## 2019-01-01 RX ADMIN — Medication 20 MILLIGRAM(S): at 12:03

## 2019-01-01 RX ADMIN — Medication 1 DROP(S): at 11:17

## 2019-01-01 RX ADMIN — SODIUM CHLORIDE 3 MILLILITER(S): 9 INJECTION INTRAMUSCULAR; INTRAVENOUS; SUBCUTANEOUS at 22:50

## 2019-01-01 RX ADMIN — Medication 40 MILLIEQUIVALENT(S): at 11:46

## 2019-01-01 RX ADMIN — PANTOPRAZOLE SODIUM 10 MG/HR: 20 TABLET, DELAYED RELEASE ORAL at 23:40

## 2019-01-01 RX ADMIN — Medication 1 DROP(S): at 13:13

## 2019-01-01 RX ADMIN — CHLORHEXIDINE GLUCONATE 1 APPLICATION(S): 213 SOLUTION TOPICAL at 08:21

## 2019-01-01 RX ADMIN — DULOXETINE HYDROCHLORIDE 20 MILLIGRAM(S): 30 CAPSULE, DELAYED RELEASE ORAL at 05:01

## 2019-01-01 RX ADMIN — Medication 1 DROP(S): at 22:24

## 2019-01-01 RX ADMIN — Medication 1 DROP(S): at 12:18

## 2019-01-01 RX ADMIN — POTASSIUM PHOSPHATE, MONOBASIC POTASSIUM PHOSPHATE, DIBASIC 62.5 MILLIMOLE(S): 236; 224 INJECTION, SOLUTION INTRAVENOUS at 03:11

## 2019-01-01 RX ADMIN — Medication 650 MILLIGRAM(S): at 21:00

## 2019-01-01 RX ADMIN — Medication 400 MILLIGRAM(S): at 13:57

## 2019-01-01 RX ADMIN — ZOLPIDEM TARTRATE 5 MILLIGRAM(S): 10 TABLET ORAL at 00:44

## 2019-01-01 RX ADMIN — SODIUM CHLORIDE 3 MILLILITER(S): 9 INJECTION INTRAMUSCULAR; INTRAVENOUS; SUBCUTANEOUS at 22:11

## 2019-01-01 RX ADMIN — VALACYCLOVIR 1000 MILLIGRAM(S): 500 TABLET, FILM COATED ORAL at 05:56

## 2019-01-01 RX ADMIN — MORPHINE SULFATE 4 MILLIGRAM(S): 50 CAPSULE, EXTENDED RELEASE ORAL at 14:43

## 2019-01-01 RX ADMIN — ZOLPIDEM TARTRATE 5 MILLIGRAM(S): 10 TABLET ORAL at 22:12

## 2019-01-01 RX ADMIN — PROPOFOL 1.91 MICROGRAM(S)/KG/MIN: 10 INJECTION, EMULSION INTRAVENOUS at 06:24

## 2019-01-01 RX ADMIN — GABAPENTIN 800 MILLIGRAM(S): 400 CAPSULE ORAL at 21:14

## 2019-01-01 RX ADMIN — POLYETHYLENE GLYCOL 3350 17 GRAM(S): 17 POWDER, FOR SOLUTION ORAL at 06:49

## 2019-01-01 RX ADMIN — Medication 200 MILLIGRAM(S): at 15:19

## 2019-01-01 RX ADMIN — OXYCODONE HYDROCHLORIDE 10 MILLIGRAM(S): 5 TABLET ORAL at 12:43

## 2019-01-01 RX ADMIN — Medication 1 DROP(S): at 03:53

## 2019-01-01 RX ADMIN — Medication 20 MILLIGRAM(S): at 23:27

## 2019-01-01 RX ADMIN — Medication 2 TABLET(S): at 16:18

## 2019-01-01 RX ADMIN — Medication 1 DROP(S): at 06:32

## 2019-01-01 RX ADMIN — GABAPENTIN 600 MILLIGRAM(S): 400 CAPSULE ORAL at 22:00

## 2019-01-01 RX ADMIN — Medication 2 TABLET(S): at 05:42

## 2019-01-01 RX ADMIN — PANTOPRAZOLE SODIUM 10 MG/HR: 20 TABLET, DELAYED RELEASE ORAL at 18:25

## 2019-01-01 RX ADMIN — LIDOCAINE 1 PATCH: 4 CREAM TOPICAL at 18:21

## 2019-01-01 RX ADMIN — MORPHINE SULFATE 2 MILLIGRAM(S): 50 CAPSULE, EXTENDED RELEASE ORAL at 21:55

## 2019-01-01 RX ADMIN — GABAPENTIN 600 MILLIGRAM(S): 400 CAPSULE ORAL at 13:28

## 2019-01-01 RX ADMIN — Medication 100 MILLIGRAM(S): at 21:34

## 2019-01-01 RX ADMIN — Medication 100 GRAM(S): at 08:56

## 2019-01-01 RX ADMIN — Medication 500 MILLIGRAM(S): at 11:46

## 2019-01-01 RX ADMIN — Medication 1 DROP(S): at 23:56

## 2019-01-01 RX ADMIN — Medication 500 MILLIGRAM(S): at 11:58

## 2019-01-01 RX ADMIN — Medication 1 DROP(S): at 04:52

## 2019-01-01 RX ADMIN — MORPHINE SULFATE 2 MILLIGRAM(S): 50 CAPSULE, EXTENDED RELEASE ORAL at 13:30

## 2019-01-01 RX ADMIN — MORPHINE SULFATE 4 MILLIGRAM(S): 50 CAPSULE, EXTENDED RELEASE ORAL at 18:46

## 2019-01-01 RX ADMIN — Medication 1 DROP(S): at 08:21

## 2019-01-01 RX ADMIN — SODIUM CHLORIDE 500 MILLILITER(S): 9 INJECTION, SOLUTION INTRAVENOUS at 19:15

## 2019-01-01 RX ADMIN — ATORVASTATIN CALCIUM 40 MILLIGRAM(S): 80 TABLET, FILM COATED ORAL at 21:23

## 2019-01-01 RX ADMIN — Medication 1 DROP(S): at 20:19

## 2019-01-01 RX ADMIN — VALACYCLOVIR 1000 MILLIGRAM(S): 500 TABLET, FILM COATED ORAL at 16:22

## 2019-01-01 RX ADMIN — Medication 1 DROP(S): at 21:50

## 2019-01-01 RX ADMIN — HYDROMORPHONE HYDROCHLORIDE 0.5 MILLIGRAM(S): 2 INJECTION INTRAMUSCULAR; INTRAVENOUS; SUBCUTANEOUS at 14:11

## 2019-01-01 RX ADMIN — PANTOPRAZOLE SODIUM 40 MILLIGRAM(S): 20 TABLET, DELAYED RELEASE ORAL at 09:41

## 2019-01-01 RX ADMIN — SODIUM CHLORIDE 75 MILLILITER(S): 9 INJECTION, SOLUTION INTRAVENOUS at 03:35

## 2019-01-01 RX ADMIN — MORPHINE SULFATE 2 MILLIGRAM(S): 50 CAPSULE, EXTENDED RELEASE ORAL at 23:51

## 2019-01-01 RX ADMIN — Medication 20 MILLIGRAM(S): at 13:38

## 2019-01-01 RX ADMIN — GABAPENTIN 600 MILLIGRAM(S): 400 CAPSULE ORAL at 22:40

## 2019-01-01 RX ADMIN — SODIUM CHLORIDE 3 MILLILITER(S): 9 INJECTION INTRAMUSCULAR; INTRAVENOUS; SUBCUTANEOUS at 20:43

## 2019-01-01 RX ADMIN — MORPHINE SULFATE 2 MILLIGRAM(S): 50 CAPSULE, EXTENDED RELEASE ORAL at 23:30

## 2019-01-01 RX ADMIN — Medication 1 DROP(S): at 06:27

## 2019-01-01 RX ADMIN — DULOXETINE HYDROCHLORIDE 30 MILLIGRAM(S): 30 CAPSULE, DELAYED RELEASE ORAL at 18:56

## 2019-01-01 RX ADMIN — POLYETHYLENE GLYCOL 3350 17 GRAM(S): 17 POWDER, FOR SOLUTION ORAL at 06:29

## 2019-01-01 RX ADMIN — Medication 1 APPLICATION(S): at 17:18

## 2019-01-01 RX ADMIN — Medication 1 DROP(S): at 08:53

## 2019-01-01 RX ADMIN — Medication 100 MILLIGRAM(S): at 05:15

## 2019-01-01 RX ADMIN — MORPHINE SULFATE 2 MILLIGRAM(S): 50 CAPSULE, EXTENDED RELEASE ORAL at 23:13

## 2019-01-01 RX ADMIN — GABAPENTIN 600 MILLIGRAM(S): 400 CAPSULE ORAL at 13:13

## 2019-01-01 RX ADMIN — Medication 500 MILLIGRAM(S): at 13:17

## 2019-01-01 RX ADMIN — VALACYCLOVIR 1000 MILLIGRAM(S): 500 TABLET, FILM COATED ORAL at 05:30

## 2019-01-01 RX ADMIN — VALACYCLOVIR 1000 MILLIGRAM(S): 500 TABLET, FILM COATED ORAL at 13:15

## 2019-01-01 RX ADMIN — OXYCODONE HYDROCHLORIDE 10 MILLIGRAM(S): 5 TABLET ORAL at 22:58

## 2019-01-01 RX ADMIN — Medication 200 MILLIGRAM(S): at 05:44

## 2019-01-01 RX ADMIN — ZOLPIDEM TARTRATE 5 MILLIGRAM(S): 10 TABLET ORAL at 00:12

## 2019-01-01 RX ADMIN — GABAPENTIN 600 MILLIGRAM(S): 400 CAPSULE ORAL at 06:27

## 2019-01-01 RX ADMIN — Medication 40 MILLIEQUIVALENT(S): at 18:05

## 2019-01-01 RX ADMIN — Medication 1 DROP(S): at 22:11

## 2019-01-01 RX ADMIN — Medication 1 DROP(S): at 01:35

## 2019-01-01 RX ADMIN — DULOXETINE HYDROCHLORIDE 20 MILLIGRAM(S): 30 CAPSULE, DELAYED RELEASE ORAL at 06:02

## 2019-01-01 RX ADMIN — Medication 50 MILLILITER(S): at 01:53

## 2019-01-01 RX ADMIN — AMLODIPINE BESYLATE 5 MILLIGRAM(S): 2.5 TABLET ORAL at 05:56

## 2019-01-01 RX ADMIN — OXYCODONE HYDROCHLORIDE 10 MILLIGRAM(S): 5 TABLET ORAL at 09:55

## 2019-01-01 RX ADMIN — Medication 20 MILLIGRAM(S): at 06:18

## 2019-01-01 RX ADMIN — SODIUM CHLORIDE 3 MILLILITER(S): 9 INJECTION INTRAMUSCULAR; INTRAVENOUS; SUBCUTANEOUS at 13:33

## 2019-01-01 RX ADMIN — SODIUM CHLORIDE 75 MILLILITER(S): 9 INJECTION, SOLUTION INTRAVENOUS at 01:20

## 2019-01-01 RX ADMIN — SODIUM CHLORIDE 1000 MILLILITER(S): 9 INJECTION INTRAMUSCULAR; INTRAVENOUS; SUBCUTANEOUS at 01:46

## 2019-01-01 RX ADMIN — VALACYCLOVIR 1000 MILLIGRAM(S): 500 TABLET, FILM COATED ORAL at 05:51

## 2019-01-01 RX ADMIN — Medication 20 MILLIGRAM(S): at 05:27

## 2019-01-01 RX ADMIN — OXYCODONE HYDROCHLORIDE 10 MILLIGRAM(S): 5 TABLET ORAL at 09:07

## 2019-01-01 RX ADMIN — GABAPENTIN 600 MILLIGRAM(S): 400 CAPSULE ORAL at 06:17

## 2019-01-01 RX ADMIN — Medication 400 MILLIGRAM(S): at 00:45

## 2019-01-01 RX ADMIN — GABAPENTIN 800 MILLIGRAM(S): 400 CAPSULE ORAL at 06:13

## 2019-01-01 RX ADMIN — CEFTRIAXONE 1000 MILLIGRAM(S): 500 INJECTION, POWDER, FOR SOLUTION INTRAMUSCULAR; INTRAVENOUS at 14:13

## 2019-01-01 RX ADMIN — MORPHINE SULFATE 2 MILLIGRAM(S): 50 CAPSULE, EXTENDED RELEASE ORAL at 12:50

## 2019-01-01 RX ADMIN — POLYETHYLENE GLYCOL 3350 17 GRAM(S): 17 POWDER, FOR SOLUTION ORAL at 05:43

## 2019-01-01 RX ADMIN — ZOLPIDEM TARTRATE 5 MILLIGRAM(S): 10 TABLET ORAL at 23:33

## 2019-01-01 RX ADMIN — GABAPENTIN 600 MILLIGRAM(S): 400 CAPSULE ORAL at 06:02

## 2019-01-01 RX ADMIN — Medication 500 MILLIGRAM(S): at 13:31

## 2019-01-01 RX ADMIN — CHLORHEXIDINE GLUCONATE 1 APPLICATION(S): 213 SOLUTION TOPICAL at 18:27

## 2019-01-01 RX ADMIN — Medication 10 MILLIGRAM(S): at 17:10

## 2019-01-01 RX ADMIN — Medication 1 DROP(S): at 01:13

## 2019-01-01 RX ADMIN — VALACYCLOVIR 1000 MILLIGRAM(S): 500 TABLET, FILM COATED ORAL at 14:58

## 2019-01-01 RX ADMIN — DULOXETINE HYDROCHLORIDE 20 MILLIGRAM(S): 30 CAPSULE, DELAYED RELEASE ORAL at 06:46

## 2019-01-01 RX ADMIN — Medication 500 MILLIGRAM(S): at 12:28

## 2019-01-01 RX ADMIN — LIDOCAINE 1 PATCH: 4 CREAM TOPICAL at 00:37

## 2019-01-01 RX ADMIN — DULOXETINE HYDROCHLORIDE 30 MILLIGRAM(S): 30 CAPSULE, DELAYED RELEASE ORAL at 17:30

## 2019-01-01 RX ADMIN — LIDOCAINE 1 PATCH: 4 CREAM TOPICAL at 18:28

## 2019-01-01 RX ADMIN — AMLODIPINE BESYLATE 5 MILLIGRAM(S): 2.5 TABLET ORAL at 05:39

## 2019-01-01 RX ADMIN — GABAPENTIN 600 MILLIGRAM(S): 400 CAPSULE ORAL at 06:20

## 2019-01-01 RX ADMIN — GABAPENTIN 800 MILLIGRAM(S): 400 CAPSULE ORAL at 14:18

## 2019-01-01 RX ADMIN — Medication 10 MILLIGRAM(S): at 17:52

## 2019-01-01 RX ADMIN — GABAPENTIN 600 MILLIGRAM(S): 400 CAPSULE ORAL at 21:39

## 2019-01-01 RX ADMIN — SODIUM CHLORIDE 3 MILLILITER(S): 9 INJECTION INTRAMUSCULAR; INTRAVENOUS; SUBCUTANEOUS at 23:41

## 2019-01-01 RX ADMIN — Medication 750 MILLIGRAM(S): at 21:26

## 2019-01-01 RX ADMIN — GABAPENTIN 800 MILLIGRAM(S): 400 CAPSULE ORAL at 06:21

## 2019-01-01 RX ADMIN — Medication 1000 MILLIGRAM(S): at 05:22

## 2019-01-01 RX ADMIN — VALACYCLOVIR 1000 MILLIGRAM(S): 500 TABLET, FILM COATED ORAL at 06:02

## 2019-01-01 RX ADMIN — Medication 200 MILLIGRAM(S): at 05:20

## 2019-01-01 RX ADMIN — ATORVASTATIN CALCIUM 40 MILLIGRAM(S): 80 TABLET, FILM COATED ORAL at 21:09

## 2019-01-01 RX ADMIN — SODIUM CHLORIDE 100 MILLILITER(S): 9 INJECTION INTRAMUSCULAR; INTRAVENOUS; SUBCUTANEOUS at 06:15

## 2019-01-01 RX ADMIN — Medication 1 DROP(S): at 19:07

## 2019-01-01 RX ADMIN — Medication 650 MILLIGRAM(S): at 22:18

## 2019-01-01 RX ADMIN — MORPHINE SULFATE 4 MILLIGRAM(S): 50 CAPSULE, EXTENDED RELEASE ORAL at 02:07

## 2019-01-01 RX ADMIN — Medication 1 DROP(S): at 12:30

## 2019-01-01 RX ADMIN — OXYCODONE AND ACETAMINOPHEN 2 TABLET(S): 5; 325 TABLET ORAL at 12:00

## 2019-01-01 RX ADMIN — PROPOFOL 13.3 MICROGRAM(S)/KG/MIN: 10 INJECTION, EMULSION INTRAVENOUS at 18:03

## 2019-01-01 RX ADMIN — GABAPENTIN 800 MILLIGRAM(S): 400 CAPSULE ORAL at 06:30

## 2019-01-01 RX ADMIN — OXYCODONE HYDROCHLORIDE 10 MILLIGRAM(S): 5 TABLET ORAL at 06:09

## 2019-01-01 RX ADMIN — SODIUM CHLORIDE 3 MILLILITER(S): 9 INJECTION INTRAMUSCULAR; INTRAVENOUS; SUBCUTANEOUS at 21:52

## 2019-01-01 RX ADMIN — Medication 1 DROP(S): at 21:09

## 2019-01-01 RX ADMIN — PANTOPRAZOLE SODIUM 40 MILLIGRAM(S): 20 TABLET, DELAYED RELEASE ORAL at 17:32

## 2019-01-01 RX ADMIN — Medication 1 APPLICATION(S): at 04:40

## 2019-01-01 RX ADMIN — Medication 650 MILLIGRAM(S): at 17:55

## 2019-01-01 RX ADMIN — OXYCODONE HYDROCHLORIDE 5 MILLIGRAM(S): 5 TABLET ORAL at 05:14

## 2019-01-01 RX ADMIN — OXYCODONE HYDROCHLORIDE 10 MILLIGRAM(S): 5 TABLET ORAL at 18:22

## 2019-01-01 RX ADMIN — CHLORHEXIDINE GLUCONATE 15 MILLILITER(S): 213 SOLUTION TOPICAL at 06:08

## 2019-01-01 RX ADMIN — GABAPENTIN 600 MILLIGRAM(S): 400 CAPSULE ORAL at 13:11

## 2019-01-01 RX ADMIN — OXYCODONE HYDROCHLORIDE 10 MILLIGRAM(S): 5 TABLET ORAL at 22:32

## 2019-01-01 RX ADMIN — MEROPENEM 100 MILLIGRAM(S): 1 INJECTION INTRAVENOUS at 23:32

## 2019-01-01 RX ADMIN — VALACYCLOVIR 1000 MILLIGRAM(S): 500 TABLET, FILM COATED ORAL at 21:35

## 2019-01-01 RX ADMIN — PIPERACILLIN AND TAZOBACTAM 25 GRAM(S): 4; .5 INJECTION, POWDER, LYOPHILIZED, FOR SOLUTION INTRAVENOUS at 14:00

## 2019-01-01 RX ADMIN — Medication 100 MILLIGRAM(S): at 12:48

## 2019-01-01 RX ADMIN — VALACYCLOVIR 1000 MILLIGRAM(S): 500 TABLET, FILM COATED ORAL at 21:17

## 2019-01-01 RX ADMIN — Medication 2 DROP(S): at 05:21

## 2019-01-01 RX ADMIN — OXYCODONE HYDROCHLORIDE 10 MILLIGRAM(S): 5 TABLET ORAL at 00:45

## 2019-01-01 RX ADMIN — Medication 1 DROP(S): at 06:30

## 2019-01-01 RX ADMIN — GABAPENTIN 800 MILLIGRAM(S): 400 CAPSULE ORAL at 22:57

## 2019-01-01 RX ADMIN — ZOLPIDEM TARTRATE 5 MILLIGRAM(S): 10 TABLET ORAL at 23:45

## 2019-01-01 RX ADMIN — MORPHINE SULFATE 2 MILLIGRAM(S): 50 CAPSULE, EXTENDED RELEASE ORAL at 18:36

## 2019-01-01 RX ADMIN — MORPHINE SULFATE 2 MILLIGRAM(S): 50 CAPSULE, EXTENDED RELEASE ORAL at 06:18

## 2019-01-01 RX ADMIN — OXYCODONE HYDROCHLORIDE 10 MILLIGRAM(S): 5 TABLET ORAL at 14:12

## 2019-01-01 RX ADMIN — Medication 200 MILLIGRAM(S): at 05:56

## 2019-01-01 RX ADMIN — GABAPENTIN 600 MILLIGRAM(S): 400 CAPSULE ORAL at 05:56

## 2019-01-01 RX ADMIN — Medication 1 DROP(S): at 13:18

## 2019-01-01 RX ADMIN — Medication 1 DROP(S): at 22:41

## 2019-01-01 RX ADMIN — DULOXETINE HYDROCHLORIDE 30 MILLIGRAM(S): 30 CAPSULE, DELAYED RELEASE ORAL at 18:00

## 2019-01-01 RX ADMIN — PIPERACILLIN AND TAZOBACTAM 25 GRAM(S): 4; .5 INJECTION, POWDER, LYOPHILIZED, FOR SOLUTION INTRAVENOUS at 23:06

## 2019-01-01 RX ADMIN — DULOXETINE HYDROCHLORIDE 30 MILLIGRAM(S): 30 CAPSULE, DELAYED RELEASE ORAL at 08:19

## 2019-01-01 RX ADMIN — SODIUM CHLORIDE 3 MILLILITER(S): 9 INJECTION INTRAMUSCULAR; INTRAVENOUS; SUBCUTANEOUS at 23:00

## 2019-01-01 RX ADMIN — SODIUM CHLORIDE 3 MILLILITER(S): 9 INJECTION INTRAMUSCULAR; INTRAVENOUS; SUBCUTANEOUS at 21:27

## 2019-01-01 RX ADMIN — Medication 1 DROP(S): at 18:12

## 2019-01-01 RX ADMIN — Medication 1 DROP(S): at 23:40

## 2019-01-01 RX ADMIN — Medication 1 DROP(S): at 08:29

## 2019-01-01 RX ADMIN — Medication 1 DROP(S): at 22:58

## 2019-01-01 RX ADMIN — LIDOCAINE 1 PATCH: 4 CREAM TOPICAL at 11:11

## 2019-01-01 RX ADMIN — OXYCODONE AND ACETAMINOPHEN 2 TABLET(S): 5; 325 TABLET ORAL at 06:29

## 2019-01-01 RX ADMIN — VALACYCLOVIR 1000 MILLIGRAM(S): 500 TABLET, FILM COATED ORAL at 06:38

## 2019-01-01 RX ADMIN — LIDOCAINE 1 PATCH: 4 CREAM TOPICAL at 23:59

## 2019-01-01 RX ADMIN — Medication 1 DROP(S): at 23:59

## 2019-01-01 RX ADMIN — DULOXETINE HYDROCHLORIDE 30 MILLIGRAM(S): 30 CAPSULE, DELAYED RELEASE ORAL at 05:15

## 2019-01-01 RX ADMIN — MORPHINE SULFATE 2 MILLIGRAM(S): 50 CAPSULE, EXTENDED RELEASE ORAL at 05:22

## 2019-01-01 RX ADMIN — ZOLPIDEM TARTRATE 5 MILLIGRAM(S): 10 TABLET ORAL at 23:30

## 2019-01-01 RX ADMIN — OXYCODONE HYDROCHLORIDE 10 MILLIGRAM(S): 5 TABLET ORAL at 22:29

## 2019-01-01 RX ADMIN — GABAPENTIN 600 MILLIGRAM(S): 400 CAPSULE ORAL at 21:21

## 2019-01-01 RX ADMIN — VALACYCLOVIR 1000 MILLIGRAM(S): 500 TABLET, FILM COATED ORAL at 12:42

## 2019-01-01 RX ADMIN — Medication 200 MILLIGRAM(S): at 18:08

## 2019-01-01 RX ADMIN — LIDOCAINE 1 PATCH: 4 CREAM TOPICAL at 01:32

## 2019-01-01 RX ADMIN — Medication 20 MILLIGRAM(S): at 05:35

## 2019-01-01 RX ADMIN — GABAPENTIN 800 MILLIGRAM(S): 400 CAPSULE ORAL at 15:45

## 2019-01-01 RX ADMIN — Medication 200 MILLIGRAM(S): at 14:15

## 2019-01-01 RX ADMIN — VALACYCLOVIR 1000 MILLIGRAM(S): 500 TABLET, FILM COATED ORAL at 21:34

## 2019-01-01 RX ADMIN — ATORVASTATIN CALCIUM 40 MILLIGRAM(S): 80 TABLET, FILM COATED ORAL at 22:02

## 2019-01-01 RX ADMIN — DULOXETINE HYDROCHLORIDE 20 MILLIGRAM(S): 30 CAPSULE, DELAYED RELEASE ORAL at 05:56

## 2019-01-01 RX ADMIN — Medication 100 MILLIGRAM(S): at 13:26

## 2019-01-01 RX ADMIN — OXYCODONE HYDROCHLORIDE 10 MILLIGRAM(S): 5 TABLET ORAL at 23:53

## 2019-01-01 RX ADMIN — Medication 1 DROP(S): at 11:40

## 2019-01-01 RX ADMIN — Medication 100 MILLIGRAM(S): at 19:26

## 2019-01-01 RX ADMIN — OXYCODONE HYDROCHLORIDE 10 MILLIGRAM(S): 5 TABLET ORAL at 14:28

## 2019-01-01 RX ADMIN — PANTOPRAZOLE SODIUM 40 MILLIGRAM(S): 20 TABLET, DELAYED RELEASE ORAL at 06:37

## 2019-01-01 RX ADMIN — Medication 10 MILLIGRAM(S): at 08:55

## 2019-01-01 RX ADMIN — GABAPENTIN 600 MILLIGRAM(S): 400 CAPSULE ORAL at 13:10

## 2019-01-01 RX ADMIN — MEROPENEM 100 MILLIGRAM(S): 1 INJECTION INTRAVENOUS at 07:44

## 2019-01-01 RX ADMIN — LIDOCAINE 1 PATCH: 4 CREAM TOPICAL at 11:47

## 2019-01-01 RX ADMIN — VALACYCLOVIR 1000 MILLIGRAM(S): 500 TABLET, FILM COATED ORAL at 04:42

## 2019-01-01 RX ADMIN — POTASSIUM PHOSPHATE, MONOBASIC POTASSIUM PHOSPHATE, DIBASIC 83.33 MILLIMOLE(S): 236; 224 INJECTION, SOLUTION INTRAVENOUS at 10:24

## 2019-01-01 RX ADMIN — Medication 650 MILLIGRAM(S): at 09:15

## 2019-01-01 RX ADMIN — ATORVASTATIN CALCIUM 40 MILLIGRAM(S): 80 TABLET, FILM COATED ORAL at 21:30

## 2019-01-01 RX ADMIN — GABAPENTIN 600 MILLIGRAM(S): 400 CAPSULE ORAL at 06:49

## 2019-01-01 RX ADMIN — Medication 1 DROP(S): at 12:00

## 2019-01-01 RX ADMIN — LIDOCAINE 1 PATCH: 4 CREAM TOPICAL at 13:22

## 2019-01-01 RX ADMIN — Medication 200 MILLIGRAM(S): at 21:02

## 2019-01-01 RX ADMIN — MEROPENEM 100 MILLIGRAM(S): 1 INJECTION INTRAVENOUS at 23:45

## 2019-01-01 RX ADMIN — INFLUENZA VIRUS VACCINE 0.5 MILLILITER(S): 15; 15; 15; 15 SUSPENSION INTRAMUSCULAR at 11:56

## 2019-01-01 RX ADMIN — Medication 1 DROP(S): at 06:38

## 2019-01-01 RX ADMIN — Medication 500 MILLIGRAM(S): at 14:12

## 2019-01-01 RX ADMIN — Medication 1 DROP(S): at 23:37

## 2019-01-01 RX ADMIN — Medication 4000 MILLILITER(S): at 13:06

## 2019-01-01 RX ADMIN — PROPOFOL 1.65 MICROGRAM(S)/KG/MIN: 10 INJECTION, EMULSION INTRAVENOUS at 22:00

## 2019-01-01 RX ADMIN — Medication 1 DROP(S): at 01:58

## 2019-01-01 RX ADMIN — Medication 1 DROP(S): at 12:52

## 2019-01-01 RX ADMIN — Medication 1 DROP(S): at 13:35

## 2019-01-01 RX ADMIN — DULOXETINE HYDROCHLORIDE 20 MILLIGRAM(S): 30 CAPSULE, DELAYED RELEASE ORAL at 05:46

## 2019-01-01 RX ADMIN — OXYCODONE HYDROCHLORIDE 10 MILLIGRAM(S): 5 TABLET ORAL at 10:46

## 2019-01-01 RX ADMIN — Medication 2 TABLET(S): at 18:31

## 2019-01-01 RX ADMIN — Medication 1 DROP(S): at 18:13

## 2019-01-01 RX ADMIN — Medication 200 MILLIGRAM(S): at 05:15

## 2019-01-01 RX ADMIN — Medication 1000 MILLIGRAM(S): at 14:12

## 2019-01-01 RX ADMIN — Medication 20 MILLIGRAM(S): at 17:14

## 2019-01-01 RX ADMIN — Medication 1 DROP(S): at 05:25

## 2019-01-01 RX ADMIN — DULOXETINE HYDROCHLORIDE 20 MILLIGRAM(S): 30 CAPSULE, DELAYED RELEASE ORAL at 11:51

## 2019-01-01 RX ADMIN — VALACYCLOVIR 1000 MILLIGRAM(S): 500 TABLET, FILM COATED ORAL at 05:04

## 2019-01-01 RX ADMIN — SODIUM CHLORIDE 50 MILLILITER(S): 9 INJECTION, SOLUTION INTRAVENOUS at 21:32

## 2019-01-01 RX ADMIN — GABAPENTIN 300 MILLIGRAM(S): 400 CAPSULE ORAL at 21:39

## 2019-01-01 RX ADMIN — GABAPENTIN 800 MILLIGRAM(S): 400 CAPSULE ORAL at 22:18

## 2019-01-01 RX ADMIN — OXYCODONE HYDROCHLORIDE 10 MILLIGRAM(S): 5 TABLET ORAL at 23:15

## 2019-01-01 RX ADMIN — MORPHINE SULFATE 4 MILLIGRAM(S): 50 CAPSULE, EXTENDED RELEASE ORAL at 17:03

## 2019-01-01 RX ADMIN — Medication 1 DROP(S): at 06:50

## 2019-01-01 RX ADMIN — Medication 100 MILLIEQUIVALENT(S): at 18:49

## 2019-01-01 RX ADMIN — GABAPENTIN 800 MILLIGRAM(S): 400 CAPSULE ORAL at 14:28

## 2019-01-01 RX ADMIN — Medication 2 DROP(S): at 11:57

## 2019-01-01 RX ADMIN — ZOLPIDEM TARTRATE 5 MILLIGRAM(S): 10 TABLET ORAL at 22:56

## 2019-01-01 RX ADMIN — Medication 20 MILLIGRAM(S): at 17:44

## 2019-01-01 RX ADMIN — Medication 1 TABLET(S): at 12:45

## 2019-01-01 RX ADMIN — POLYETHYLENE GLYCOL 3350 17 GRAM(S): 17 POWDER, FOR SOLUTION ORAL at 06:44

## 2019-01-01 RX ADMIN — GABAPENTIN 800 MILLIGRAM(S): 400 CAPSULE ORAL at 13:08

## 2019-01-01 RX ADMIN — OXYCODONE AND ACETAMINOPHEN 2 TABLET(S): 5; 325 TABLET ORAL at 03:00

## 2019-01-01 RX ADMIN — MORPHINE SULFATE 2 MILLIGRAM(S): 50 CAPSULE, EXTENDED RELEASE ORAL at 18:13

## 2019-01-01 RX ADMIN — Medication 1 DROP(S): at 22:42

## 2019-01-01 RX ADMIN — MORPHINE SULFATE 2 MILLIGRAM(S): 50 CAPSULE, EXTENDED RELEASE ORAL at 11:23

## 2019-01-01 RX ADMIN — SODIUM CHLORIDE 1550 MILLILITER(S): 9 INJECTION INTRAMUSCULAR; INTRAVENOUS; SUBCUTANEOUS at 17:20

## 2019-01-01 RX ADMIN — LIDOCAINE 1 PATCH: 4 CREAM TOPICAL at 11:49

## 2019-01-01 RX ADMIN — DULOXETINE HYDROCHLORIDE 20 MILLIGRAM(S): 30 CAPSULE, DELAYED RELEASE ORAL at 16:49

## 2019-01-01 RX ADMIN — GABAPENTIN 600 MILLIGRAM(S): 400 CAPSULE ORAL at 06:22

## 2019-01-01 RX ADMIN — PANTOPRAZOLE SODIUM 40 MILLIGRAM(S): 20 TABLET, DELAYED RELEASE ORAL at 05:06

## 2019-01-01 RX ADMIN — VALACYCLOVIR 1000 MILLIGRAM(S): 500 TABLET, FILM COATED ORAL at 12:43

## 2019-01-01 RX ADMIN — CEFTRIAXONE 100 MILLIGRAM(S): 500 INJECTION, POWDER, FOR SOLUTION INTRAMUSCULAR; INTRAVENOUS at 18:15

## 2019-01-01 RX ADMIN — LISINOPRIL 10 MILLIGRAM(S): 2.5 TABLET ORAL at 11:22

## 2019-01-01 RX ADMIN — POLYETHYLENE GLYCOL 3350 17 GRAM(S): 17 POWDER, FOR SOLUTION ORAL at 17:15

## 2019-01-01 RX ADMIN — OXYCODONE HYDROCHLORIDE 5 MILLIGRAM(S): 5 TABLET ORAL at 18:06

## 2019-01-01 RX ADMIN — Medication 1 TABLET(S): at 11:46

## 2019-01-01 RX ADMIN — Medication 1 APPLICATION(S): at 23:40

## 2019-01-01 RX ADMIN — Medication 1 DROP(S): at 17:53

## 2019-01-01 RX ADMIN — OXYCODONE HYDROCHLORIDE 5 MILLIGRAM(S): 5 TABLET ORAL at 17:36

## 2019-01-01 RX ADMIN — AMLODIPINE BESYLATE 5 MILLIGRAM(S): 2.5 TABLET ORAL at 07:03

## 2019-01-01 RX ADMIN — Medication 20 MILLIGRAM(S): at 18:37

## 2019-01-01 RX ADMIN — GABAPENTIN 600 MILLIGRAM(S): 400 CAPSULE ORAL at 14:12

## 2019-01-01 RX ADMIN — Medication 200 MILLIGRAM(S): at 05:22

## 2019-01-01 RX ADMIN — Medication 4000 MILLILITER(S): at 14:52

## 2019-01-01 RX ADMIN — PANTOPRAZOLE SODIUM 10 MG/HR: 20 TABLET, DELAYED RELEASE ORAL at 20:00

## 2019-01-01 RX ADMIN — MORPHINE SULFATE 4 MILLIGRAM(S): 50 CAPSULE, EXTENDED RELEASE ORAL at 09:11

## 2019-01-01 RX ADMIN — Medication 1 DROP(S): at 11:10

## 2019-01-01 RX ADMIN — SODIUM CHLORIDE 3 MILLILITER(S): 9 INJECTION INTRAMUSCULAR; INTRAVENOUS; SUBCUTANEOUS at 21:00

## 2019-01-01 RX ADMIN — CEFTRIAXONE 1000 MILLIGRAM(S): 500 INJECTION, POWDER, FOR SOLUTION INTRAMUSCULAR; INTRAVENOUS at 10:51

## 2019-01-01 RX ADMIN — CHLORHEXIDINE GLUCONATE 1 APPLICATION(S): 213 SOLUTION TOPICAL at 12:02

## 2019-01-01 RX ADMIN — Medication 1 DROP(S): at 12:07

## 2019-01-01 RX ADMIN — LIDOCAINE 1 PATCH: 4 CREAM TOPICAL at 05:13

## 2019-01-01 RX ADMIN — PANTOPRAZOLE SODIUM 10 MG/HR: 20 TABLET, DELAYED RELEASE ORAL at 06:22

## 2019-01-01 RX ADMIN — MORPHINE SULFATE 4 MILLIGRAM(S): 50 CAPSULE, EXTENDED RELEASE ORAL at 14:18

## 2019-01-01 RX ADMIN — Medication 100 MILLIGRAM(S): at 05:37

## 2019-01-01 RX ADMIN — GABAPENTIN 600 MILLIGRAM(S): 400 CAPSULE ORAL at 14:06

## 2019-01-01 RX ADMIN — Medication 1 DROP(S): at 20:24

## 2019-01-01 RX ADMIN — OXYCODONE HYDROCHLORIDE 10 MILLIGRAM(S): 5 TABLET ORAL at 22:26

## 2019-01-01 RX ADMIN — ATORVASTATIN CALCIUM 40 MILLIGRAM(S): 80 TABLET, FILM COATED ORAL at 21:29

## 2019-01-01 RX ADMIN — OXYCODONE HYDROCHLORIDE 5 MILLIGRAM(S): 5 TABLET ORAL at 05:09

## 2019-01-01 RX ADMIN — LIDOCAINE 1 PATCH: 4 CREAM TOPICAL at 11:35

## 2019-01-01 RX ADMIN — ZOLPIDEM TARTRATE 5 MILLIGRAM(S): 10 TABLET ORAL at 23:59

## 2019-01-01 RX ADMIN — MAGNESIUM OXIDE 400 MG ORAL TABLET 400 MILLIGRAM(S): 241.3 TABLET ORAL at 07:06

## 2019-01-01 RX ADMIN — Medication 1 DROP(S): at 21:18

## 2019-01-01 RX ADMIN — MORPHINE SULFATE 2 MILLIGRAM(S): 50 CAPSULE, EXTENDED RELEASE ORAL at 06:59

## 2019-01-01 RX ADMIN — CHLORHEXIDINE GLUCONATE 1 APPLICATION(S): 213 SOLUTION TOPICAL at 06:25

## 2019-01-01 RX ADMIN — Medication 1 DROP(S): at 02:23

## 2019-01-01 RX ADMIN — ATORVASTATIN CALCIUM 40 MILLIGRAM(S): 80 TABLET, FILM COATED ORAL at 21:59

## 2019-01-01 RX ADMIN — OXYCODONE HYDROCHLORIDE 10 MILLIGRAM(S): 5 TABLET ORAL at 22:41

## 2019-01-01 RX ADMIN — OXYCODONE HYDROCHLORIDE 10 MILLIGRAM(S): 5 TABLET ORAL at 17:43

## 2019-01-01 RX ADMIN — OXYCODONE HYDROCHLORIDE 10 MILLIGRAM(S): 5 TABLET ORAL at 16:32

## 2019-01-01 RX ADMIN — SODIUM CHLORIDE 3 MILLILITER(S): 9 INJECTION INTRAMUSCULAR; INTRAVENOUS; SUBCUTANEOUS at 21:50

## 2019-01-01 RX ADMIN — AMLODIPINE BESYLATE 5 MILLIGRAM(S): 2.5 TABLET ORAL at 14:57

## 2019-01-01 RX ADMIN — OXYCODONE AND ACETAMINOPHEN 2 TABLET(S): 5; 325 TABLET ORAL at 05:57

## 2019-01-01 RX ADMIN — GABAPENTIN 600 MILLIGRAM(S): 400 CAPSULE ORAL at 05:05

## 2019-01-01 RX ADMIN — MORPHINE SULFATE 2 MILLIGRAM(S): 50 CAPSULE, EXTENDED RELEASE ORAL at 09:30

## 2019-01-01 RX ADMIN — GABAPENTIN 600 MILLIGRAM(S): 400 CAPSULE ORAL at 13:46

## 2019-01-01 RX ADMIN — LIDOCAINE 1 PATCH: 4 CREAM TOPICAL at 22:00

## 2019-01-01 RX ADMIN — MORPHINE SULFATE 2 MILLIGRAM(S): 50 CAPSULE, EXTENDED RELEASE ORAL at 23:20

## 2019-01-01 RX ADMIN — FENTANYL CITRATE 25 MICROGRAM(S): 50 INJECTION INTRAVENOUS at 02:31

## 2019-01-01 RX ADMIN — DULOXETINE HYDROCHLORIDE 20 MILLIGRAM(S): 30 CAPSULE, DELAYED RELEASE ORAL at 05:34

## 2019-01-01 RX ADMIN — Medication 200 MILLIGRAM(S): at 06:08

## 2019-01-01 RX ADMIN — Medication 100 MILLIGRAM(S): at 05:04

## 2019-01-01 RX ADMIN — DULOXETINE HYDROCHLORIDE 30 MILLIGRAM(S): 30 CAPSULE, DELAYED RELEASE ORAL at 19:02

## 2019-01-01 RX ADMIN — Medication 200 MILLIGRAM(S): at 18:32

## 2019-01-01 RX ADMIN — Medication 1 DROP(S): at 15:33

## 2019-01-01 RX ADMIN — GABAPENTIN 800 MILLIGRAM(S): 400 CAPSULE ORAL at 21:09

## 2019-01-01 RX ADMIN — Medication 1 DROP(S): at 11:43

## 2019-01-01 RX ADMIN — GABAPENTIN 800 MILLIGRAM(S): 400 CAPSULE ORAL at 06:52

## 2019-01-01 RX ADMIN — VALACYCLOVIR 1000 MILLIGRAM(S): 500 TABLET, FILM COATED ORAL at 13:03

## 2019-01-01 RX ADMIN — PANTOPRAZOLE SODIUM 40 MILLIGRAM(S): 20 TABLET, DELAYED RELEASE ORAL at 19:00

## 2019-01-01 RX ADMIN — LIDOCAINE 1 PATCH: 4 CREAM TOPICAL at 11:25

## 2019-01-01 RX ADMIN — ZOLPIDEM TARTRATE 5 MILLIGRAM(S): 10 TABLET ORAL at 01:42

## 2019-01-01 RX ADMIN — Medication 20 MILLIGRAM(S): at 18:20

## 2019-01-01 RX ADMIN — PIPERACILLIN AND TAZOBACTAM 200 GRAM(S): 4; .5 INJECTION, POWDER, LYOPHILIZED, FOR SOLUTION INTRAVENOUS at 19:28

## 2019-01-01 RX ADMIN — Medication 20 MILLIGRAM(S): at 13:35

## 2019-01-01 RX ADMIN — Medication 200 MILLIGRAM(S): at 17:04

## 2019-01-01 RX ADMIN — VALACYCLOVIR 1000 MILLIGRAM(S): 500 TABLET, FILM COATED ORAL at 23:06

## 2019-01-01 RX ADMIN — Medication 1 DROP(S): at 23:23

## 2019-01-01 RX ADMIN — ATORVASTATIN CALCIUM 40 MILLIGRAM(S): 80 TABLET, FILM COATED ORAL at 22:28

## 2019-01-01 RX ADMIN — Medication 20 MILLIGRAM(S): at 17:36

## 2019-01-01 RX ADMIN — Medication 650 MILLIGRAM(S): at 21:22

## 2019-01-01 RX ADMIN — OXYCODONE HYDROCHLORIDE 10 MILLIGRAM(S): 5 TABLET ORAL at 15:34

## 2019-01-01 RX ADMIN — Medication 650 MILLIGRAM(S): at 20:47

## 2019-01-01 RX ADMIN — LIDOCAINE 1 PATCH: 4 CREAM TOPICAL at 12:29

## 2019-01-01 RX ADMIN — Medication 1 DROP(S): at 23:54

## 2019-01-01 RX ADMIN — Medication 100 MILLIGRAM(S): at 21:18

## 2019-01-01 RX ADMIN — GABAPENTIN 600 MILLIGRAM(S): 400 CAPSULE ORAL at 06:21

## 2019-01-01 RX ADMIN — PROPOFOL 1.44 MICROGRAM(S)/KG/MIN: 10 INJECTION, EMULSION INTRAVENOUS at 23:00

## 2019-01-01 RX ADMIN — Medication 1 DROP(S): at 21:23

## 2019-01-01 RX ADMIN — Medication 10 MILLIGRAM(S): at 16:45

## 2019-01-01 RX ADMIN — SODIUM CHLORIDE 90 MILLILITER(S): 9 INJECTION, SOLUTION INTRAVENOUS at 22:05

## 2019-01-01 RX ADMIN — Medication 20 MILLIGRAM(S): at 18:19

## 2019-01-01 RX ADMIN — Medication 1 APPLICATION(S): at 06:20

## 2019-01-01 RX ADMIN — VALACYCLOVIR 1000 MILLIGRAM(S): 500 TABLET, FILM COATED ORAL at 06:28

## 2019-01-01 RX ADMIN — SODIUM CHLORIDE 1000 MILLILITER(S): 9 INJECTION INTRAMUSCULAR; INTRAVENOUS; SUBCUTANEOUS at 23:15

## 2019-01-01 RX ADMIN — DULOXETINE HYDROCHLORIDE 20 MILLIGRAM(S): 30 CAPSULE, DELAYED RELEASE ORAL at 17:39

## 2019-01-01 RX ADMIN — LIDOCAINE 1 PATCH: 4 CREAM TOPICAL at 01:09

## 2019-01-01 RX ADMIN — CHLORHEXIDINE GLUCONATE 15 MILLILITER(S): 213 SOLUTION TOPICAL at 18:51

## 2019-01-01 RX ADMIN — CHLORHEXIDINE GLUCONATE 1 APPLICATION(S): 213 SOLUTION TOPICAL at 18:35

## 2019-01-01 RX ADMIN — Medication 1 DROP(S): at 11:52

## 2019-01-01 RX ADMIN — GABAPENTIN 800 MILLIGRAM(S): 400 CAPSULE ORAL at 05:11

## 2019-01-01 RX ADMIN — GABAPENTIN 800 MILLIGRAM(S): 400 CAPSULE ORAL at 21:36

## 2019-01-01 RX ADMIN — Medication 20 MILLIGRAM(S): at 05:41

## 2019-01-01 RX ADMIN — GABAPENTIN 600 MILLIGRAM(S): 400 CAPSULE ORAL at 22:24

## 2019-01-01 RX ADMIN — PANTOPRAZOLE SODIUM 80 MILLIGRAM(S): 20 TABLET, DELAYED RELEASE ORAL at 18:11

## 2019-01-01 RX ADMIN — PIPERACILLIN AND TAZOBACTAM 25 GRAM(S): 4; .5 INJECTION, POWDER, LYOPHILIZED, FOR SOLUTION INTRAVENOUS at 05:23

## 2019-01-01 RX ADMIN — DULOXETINE HYDROCHLORIDE 30 MILLIGRAM(S): 30 CAPSULE, DELAYED RELEASE ORAL at 17:38

## 2019-01-01 RX ADMIN — DULOXETINE HYDROCHLORIDE 30 MILLIGRAM(S): 30 CAPSULE, DELAYED RELEASE ORAL at 06:37

## 2019-01-01 RX ADMIN — CHLORHEXIDINE GLUCONATE 1 APPLICATION(S): 213 SOLUTION TOPICAL at 05:19

## 2019-01-01 RX ADMIN — Medication 1 DROP(S): at 00:06

## 2019-01-01 RX ADMIN — Medication 250 MILLIGRAM(S): at 01:36

## 2019-01-01 RX ADMIN — Medication 650 MILLIGRAM(S): at 12:31

## 2019-01-01 RX ADMIN — Medication 20 MILLIGRAM(S): at 05:53

## 2019-01-01 RX ADMIN — Medication 1 DROP(S): at 05:22

## 2019-01-01 RX ADMIN — GABAPENTIN 800 MILLIGRAM(S): 400 CAPSULE ORAL at 11:38

## 2019-01-01 RX ADMIN — HYDROMORPHONE HYDROCHLORIDE 0.5 MILLIGRAM(S): 2 INJECTION INTRAMUSCULAR; INTRAVENOUS; SUBCUTANEOUS at 03:00

## 2019-01-01 RX ADMIN — Medication 20 MILLIGRAM(S): at 18:09

## 2019-01-01 RX ADMIN — Medication 650 MILLIGRAM(S): at 20:20

## 2019-01-01 RX ADMIN — Medication 1 DROP(S): at 17:32

## 2019-01-01 RX ADMIN — Medication 1 DROP(S): at 17:17

## 2019-01-01 RX ADMIN — Medication 1 DROP(S): at 22:38

## 2019-01-01 RX ADMIN — Medication 20 MILLIGRAM(S): at 16:00

## 2019-01-01 RX ADMIN — Medication 1000 MILLIGRAM(S): at 19:01

## 2019-01-01 RX ADMIN — Medication 1 DROP(S): at 04:42

## 2019-01-01 RX ADMIN — OXYCODONE HYDROCHLORIDE 10 MILLIGRAM(S): 5 TABLET ORAL at 10:20

## 2019-01-01 RX ADMIN — Medication 133 MILLILITER(S): at 12:50

## 2019-01-01 RX ADMIN — LIDOCAINE 1 PATCH: 4 CREAM TOPICAL at 12:25

## 2019-01-01 RX ADMIN — ATORVASTATIN CALCIUM 40 MILLIGRAM(S): 80 TABLET, FILM COATED ORAL at 22:38

## 2019-01-01 RX ADMIN — Medication 200 MILLIGRAM(S): at 17:51

## 2019-01-01 RX ADMIN — OXYCODONE HYDROCHLORIDE 10 MILLIGRAM(S): 5 TABLET ORAL at 09:05

## 2019-01-01 RX ADMIN — OXYCODONE HYDROCHLORIDE 10 MILLIGRAM(S): 5 TABLET ORAL at 23:00

## 2019-01-01 RX ADMIN — Medication 1 DROP(S): at 23:05

## 2019-01-01 RX ADMIN — Medication 500 MILLIGRAM(S): at 12:48

## 2019-01-01 RX ADMIN — Medication 500 MILLIGRAM(S): at 12:01

## 2019-01-01 RX ADMIN — Medication 20 MILLIGRAM(S): at 18:23

## 2019-01-01 RX ADMIN — Medication 20 MILLIGRAM(S): at 21:02

## 2019-01-01 RX ADMIN — Medication 10 MILLIGRAM(S): at 19:36

## 2019-01-01 RX ADMIN — Medication 1 DROP(S): at 13:10

## 2019-01-01 RX ADMIN — Medication 20 MILLIGRAM(S): at 06:28

## 2019-01-01 RX ADMIN — MORPHINE SULFATE 2 MILLIGRAM(S): 50 CAPSULE, EXTENDED RELEASE ORAL at 18:35

## 2019-01-01 RX ADMIN — Medication 1 DROP(S): at 05:18

## 2019-01-01 RX ADMIN — Medication 10 MILLIGRAM(S): at 00:21

## 2019-01-01 RX ADMIN — VALACYCLOVIR 1000 MILLIGRAM(S): 500 TABLET, FILM COATED ORAL at 05:42

## 2019-01-01 RX ADMIN — Medication 200 MILLIGRAM(S): at 18:20

## 2019-01-01 RX ADMIN — Medication 20 MILLIGRAM(S): at 10:01

## 2019-01-01 RX ADMIN — MORPHINE SULFATE 4 MILLIGRAM(S): 50 CAPSULE, EXTENDED RELEASE ORAL at 16:39

## 2019-01-01 RX ADMIN — GABAPENTIN 600 MILLIGRAM(S): 400 CAPSULE ORAL at 21:33

## 2019-01-01 RX ADMIN — Medication 1 DROP(S): at 05:42

## 2019-01-01 RX ADMIN — Medication 20 MILLIGRAM(S): at 05:18

## 2019-01-01 RX ADMIN — GABAPENTIN 600 MILLIGRAM(S): 400 CAPSULE ORAL at 13:32

## 2019-01-01 RX ADMIN — MORPHINE SULFATE 2 MILLIGRAM(S): 50 CAPSULE, EXTENDED RELEASE ORAL at 23:50

## 2019-01-01 RX ADMIN — Medication 1 DROP(S): at 10:23

## 2019-01-01 RX ADMIN — MEROPENEM 100 MILLIGRAM(S): 1 INJECTION INTRAVENOUS at 07:00

## 2019-01-01 RX ADMIN — GABAPENTIN 600 MILLIGRAM(S): 400 CAPSULE ORAL at 13:35

## 2019-01-01 RX ADMIN — VALACYCLOVIR 1000 MILLIGRAM(S): 500 TABLET, FILM COATED ORAL at 12:04

## 2019-01-01 RX ADMIN — PANTOPRAZOLE SODIUM 10 MG/HR: 20 TABLET, DELAYED RELEASE ORAL at 00:23

## 2019-01-01 RX ADMIN — MORPHINE SULFATE 4 MILLIGRAM(S): 50 CAPSULE, EXTENDED RELEASE ORAL at 21:57

## 2019-01-01 RX ADMIN — Medication 500 MILLIGRAM(S): at 12:40

## 2019-01-01 RX ADMIN — MEROPENEM 100 MILLIGRAM(S): 1 INJECTION INTRAVENOUS at 05:12

## 2019-01-01 RX ADMIN — Medication 1 APPLICATION(S): at 12:34

## 2019-01-01 RX ADMIN — Medication 1 DROP(S): at 11:29

## 2019-01-01 RX ADMIN — PANTOPRAZOLE SODIUM 10 MG/HR: 20 TABLET, DELAYED RELEASE ORAL at 15:58

## 2019-01-01 RX ADMIN — Medication 650 MILLIGRAM(S): at 16:15

## 2019-01-01 RX ADMIN — FENTANYL CITRATE 25 MICROGRAM(S): 50 INJECTION INTRAVENOUS at 16:15

## 2019-01-01 RX ADMIN — Medication 20 MILLIGRAM(S): at 18:54

## 2019-01-01 RX ADMIN — Medication 260 MILLIGRAM(S): at 06:50

## 2019-01-01 RX ADMIN — OXYCODONE HYDROCHLORIDE 10 MILLIGRAM(S): 5 TABLET ORAL at 11:38

## 2019-01-01 RX ADMIN — PANTOPRAZOLE SODIUM 10 MG/HR: 20 TABLET, DELAYED RELEASE ORAL at 13:53

## 2019-01-01 RX ADMIN — VALACYCLOVIR 1000 MILLIGRAM(S): 500 TABLET, FILM COATED ORAL at 13:29

## 2019-01-01 RX ADMIN — SODIUM CHLORIDE 1000 MILLILITER(S): 9 INJECTION INTRAMUSCULAR; INTRAVENOUS; SUBCUTANEOUS at 00:30

## 2019-01-01 RX ADMIN — Medication 650 MILLIGRAM(S): at 16:50

## 2019-01-01 RX ADMIN — PANTOPRAZOLE SODIUM 40 MILLIGRAM(S): 20 TABLET, DELAYED RELEASE ORAL at 06:03

## 2019-01-01 RX ADMIN — OXYCODONE HYDROCHLORIDE 10 MILLIGRAM(S): 5 TABLET ORAL at 06:59

## 2019-01-01 RX ADMIN — CHLORHEXIDINE GLUCONATE 1 APPLICATION(S): 213 SOLUTION TOPICAL at 05:10

## 2019-01-01 RX ADMIN — Medication 500 MILLIGRAM(S): at 12:32

## 2019-01-01 RX ADMIN — PROPOFOL 1.65 MICROGRAM(S)/KG/MIN: 10 INJECTION, EMULSION INTRAVENOUS at 13:10

## 2019-01-01 RX ADMIN — PANTOPRAZOLE SODIUM 40 MILLIGRAM(S): 20 TABLET, DELAYED RELEASE ORAL at 06:22

## 2019-01-01 RX ADMIN — Medication 1 DROP(S): at 06:56

## 2019-01-01 RX ADMIN — OXYCODONE HYDROCHLORIDE 10 MILLIGRAM(S): 5 TABLET ORAL at 01:45

## 2019-01-01 RX ADMIN — SODIUM CHLORIDE 3 MILLILITER(S): 9 INJECTION INTRAMUSCULAR; INTRAVENOUS; SUBCUTANEOUS at 22:12

## 2019-01-01 RX ADMIN — HYDROMORPHONE HYDROCHLORIDE 0.5 MILLIGRAM(S): 2 INJECTION INTRAMUSCULAR; INTRAVENOUS; SUBCUTANEOUS at 07:45

## 2019-01-01 RX ADMIN — OXYCODONE HYDROCHLORIDE 10 MILLIGRAM(S): 5 TABLET ORAL at 19:00

## 2019-01-01 RX ADMIN — OXYCODONE HYDROCHLORIDE 5 MILLIGRAM(S): 5 TABLET ORAL at 06:05

## 2019-01-01 RX ADMIN — SODIUM CHLORIDE 3 MILLILITER(S): 9 INJECTION INTRAMUSCULAR; INTRAVENOUS; SUBCUTANEOUS at 15:01

## 2019-01-01 RX ADMIN — Medication 1 DROP(S): at 11:04

## 2019-01-01 RX ADMIN — GABAPENTIN 600 MILLIGRAM(S): 400 CAPSULE ORAL at 22:29

## 2019-01-01 RX ADMIN — DULOXETINE HYDROCHLORIDE 30 MILLIGRAM(S): 30 CAPSULE, DELAYED RELEASE ORAL at 18:08

## 2019-01-01 RX ADMIN — ATORVASTATIN CALCIUM 40 MILLIGRAM(S): 80 TABLET, FILM COATED ORAL at 23:06

## 2019-01-01 RX ADMIN — VALACYCLOVIR 1000 MILLIGRAM(S): 500 TABLET, FILM COATED ORAL at 13:14

## 2019-01-01 RX ADMIN — OXYCODONE HYDROCHLORIDE 10 MILLIGRAM(S): 5 TABLET ORAL at 12:56

## 2019-01-01 RX ADMIN — PANTOPRAZOLE SODIUM 40 MILLIGRAM(S): 20 TABLET, DELAYED RELEASE ORAL at 07:06

## 2019-01-01 RX ADMIN — OXYCODONE HYDROCHLORIDE 10 MILLIGRAM(S): 5 TABLET ORAL at 21:34

## 2019-01-01 RX ADMIN — PROPOFOL 1.52 MICROGRAM(S)/KG/MIN: 10 INJECTION, EMULSION INTRAVENOUS at 20:36

## 2019-01-01 RX ADMIN — Medication 100 MILLIEQUIVALENT(S): at 08:00

## 2019-01-01 RX ADMIN — PANTOPRAZOLE SODIUM 40 MILLIGRAM(S): 20 TABLET, DELAYED RELEASE ORAL at 17:04

## 2019-01-01 RX ADMIN — GABAPENTIN 800 MILLIGRAM(S): 400 CAPSULE ORAL at 22:09

## 2019-01-01 RX ADMIN — DULOXETINE HYDROCHLORIDE 30 MILLIGRAM(S): 30 CAPSULE, DELAYED RELEASE ORAL at 18:31

## 2019-01-01 RX ADMIN — MORPHINE SULFATE 2 MILLIGRAM(S): 50 CAPSULE, EXTENDED RELEASE ORAL at 20:55

## 2019-01-01 RX ADMIN — Medication 1 DROP(S): at 23:21

## 2019-01-01 RX ADMIN — PIPERACILLIN AND TAZOBACTAM 25 GRAM(S): 4; .5 INJECTION, POWDER, LYOPHILIZED, FOR SOLUTION INTRAVENOUS at 21:18

## 2019-01-01 RX ADMIN — Medication 500 MILLIGRAM(S): at 22:55

## 2019-01-01 RX ADMIN — AMLODIPINE BESYLATE 5 MILLIGRAM(S): 2.5 TABLET ORAL at 05:05

## 2019-01-01 RX ADMIN — PANTOPRAZOLE SODIUM 10 MG/HR: 20 TABLET, DELAYED RELEASE ORAL at 18:04

## 2019-01-01 RX ADMIN — SODIUM CHLORIDE 3 MILLILITER(S): 9 INJECTION INTRAMUSCULAR; INTRAVENOUS; SUBCUTANEOUS at 05:08

## 2019-01-01 RX ADMIN — SODIUM CHLORIDE 3 MILLILITER(S): 9 INJECTION INTRAMUSCULAR; INTRAVENOUS; SUBCUTANEOUS at 13:20

## 2019-01-01 RX ADMIN — VALACYCLOVIR 1000 MILLIGRAM(S): 500 TABLET, FILM COATED ORAL at 13:26

## 2019-01-01 RX ADMIN — DULOXETINE HYDROCHLORIDE 30 MILLIGRAM(S): 30 CAPSULE, DELAYED RELEASE ORAL at 12:16

## 2019-01-01 RX ADMIN — Medication 200 MILLIGRAM(S): at 05:27

## 2019-01-01 RX ADMIN — Medication 1 DROP(S): at 12:39

## 2019-01-01 RX ADMIN — Medication 1 DROP(S): at 04:03

## 2019-01-01 RX ADMIN — Medication 40 MILLIEQUIVALENT(S): at 17:52

## 2019-01-01 RX ADMIN — MEROPENEM 100 MILLIGRAM(S): 1 INJECTION INTRAVENOUS at 21:33

## 2019-01-01 RX ADMIN — POLYETHYLENE GLYCOL 3350 17 GRAM(S): 17 POWDER, FOR SOLUTION ORAL at 05:30

## 2019-01-01 RX ADMIN — Medication 1 DROP(S): at 17:03

## 2019-01-01 RX ADMIN — OXYCODONE HYDROCHLORIDE 10 MILLIGRAM(S): 5 TABLET ORAL at 21:00

## 2019-01-01 RX ADMIN — DULOXETINE HYDROCHLORIDE 30 MILLIGRAM(S): 30 CAPSULE, DELAYED RELEASE ORAL at 09:46

## 2019-01-01 RX ADMIN — DULOXETINE HYDROCHLORIDE 30 MILLIGRAM(S): 30 CAPSULE, DELAYED RELEASE ORAL at 20:18

## 2019-01-01 RX ADMIN — GABAPENTIN 600 MILLIGRAM(S): 400 CAPSULE ORAL at 05:40

## 2019-01-01 RX ADMIN — Medication 1 DROP(S): at 05:04

## 2019-01-01 RX ADMIN — MORPHINE SULFATE 2 MILLIGRAM(S): 50 CAPSULE, EXTENDED RELEASE ORAL at 21:19

## 2019-01-01 RX ADMIN — Medication 200 MILLIGRAM(S): at 18:53

## 2019-01-01 RX ADMIN — MORPHINE SULFATE 2 MILLIGRAM(S): 50 CAPSULE, EXTENDED RELEASE ORAL at 07:17

## 2019-01-01 RX ADMIN — Medication 20 MILLIGRAM(S): at 17:08

## 2019-01-01 RX ADMIN — Medication 20 MILLIGRAM(S): at 22:44

## 2019-01-01 RX ADMIN — MEROPENEM 100 MILLIGRAM(S): 1 INJECTION INTRAVENOUS at 00:12

## 2019-01-01 RX ADMIN — MORPHINE SULFATE 2 MILLIGRAM(S): 50 CAPSULE, EXTENDED RELEASE ORAL at 23:03

## 2019-01-01 RX ADMIN — DULOXETINE HYDROCHLORIDE 20 MILLIGRAM(S): 30 CAPSULE, DELAYED RELEASE ORAL at 05:13

## 2019-01-01 RX ADMIN — ATORVASTATIN CALCIUM 40 MILLIGRAM(S): 80 TABLET, FILM COATED ORAL at 21:51

## 2019-01-01 RX ADMIN — OXYCODONE HYDROCHLORIDE 10 MILLIGRAM(S): 5 TABLET ORAL at 05:18

## 2019-01-01 RX ADMIN — NALOXONE HYDROCHLORIDE 0.4 MILLIGRAM(S): 4 SPRAY NASAL at 00:10

## 2019-01-01 RX ADMIN — DULOXETINE HYDROCHLORIDE 30 MILLIGRAM(S): 30 CAPSULE, DELAYED RELEASE ORAL at 06:10

## 2019-01-01 RX ADMIN — Medication 2 DROP(S): at 00:30

## 2019-01-01 RX ADMIN — ATORVASTATIN CALCIUM 40 MILLIGRAM(S): 80 TABLET, FILM COATED ORAL at 21:01

## 2019-01-01 RX ADMIN — Medication 20 MILLIGRAM(S): at 05:05

## 2019-01-01 RX ADMIN — DULOXETINE HYDROCHLORIDE 30 MILLIGRAM(S): 30 CAPSULE, DELAYED RELEASE ORAL at 17:20

## 2019-01-01 RX ADMIN — GABAPENTIN 800 MILLIGRAM(S): 400 CAPSULE ORAL at 06:28

## 2019-01-01 RX ADMIN — PANTOPRAZOLE SODIUM 40 MILLIGRAM(S): 20 TABLET, DELAYED RELEASE ORAL at 17:44

## 2019-01-01 RX ADMIN — OXYCODONE AND ACETAMINOPHEN 2 TABLET(S): 5; 325 TABLET ORAL at 20:34

## 2019-01-01 RX ADMIN — Medication 650 MILLIGRAM(S): at 09:28

## 2019-01-01 RX ADMIN — Medication 1 DROP(S): at 23:45

## 2019-01-01 RX ADMIN — ATORVASTATIN CALCIUM 40 MILLIGRAM(S): 80 TABLET, FILM COATED ORAL at 21:22

## 2019-01-01 RX ADMIN — VALACYCLOVIR 1000 MILLIGRAM(S): 500 TABLET, FILM COATED ORAL at 22:14

## 2019-01-01 RX ADMIN — GABAPENTIN 800 MILLIGRAM(S): 400 CAPSULE ORAL at 13:02

## 2019-01-01 RX ADMIN — DULOXETINE HYDROCHLORIDE 20 MILLIGRAM(S): 30 CAPSULE, DELAYED RELEASE ORAL at 17:37

## 2019-01-01 RX ADMIN — VALACYCLOVIR 1000 MILLIGRAM(S): 500 TABLET, FILM COATED ORAL at 12:45

## 2019-01-01 RX ADMIN — MORPHINE SULFATE 2 MILLIGRAM(S): 50 CAPSULE, EXTENDED RELEASE ORAL at 20:45

## 2019-01-01 RX ADMIN — VALACYCLOVIR 1000 MILLIGRAM(S): 500 TABLET, FILM COATED ORAL at 22:34

## 2019-01-01 RX ADMIN — Medication 1 APPLICATION(S): at 11:54

## 2019-01-01 RX ADMIN — VALACYCLOVIR 1000 MILLIGRAM(S): 500 TABLET, FILM COATED ORAL at 06:52

## 2019-01-01 RX ADMIN — OXYCODONE HYDROCHLORIDE 10 MILLIGRAM(S): 5 TABLET ORAL at 01:00

## 2019-01-01 RX ADMIN — POLYETHYLENE GLYCOL 3350 17 GRAM(S): 17 POWDER, FOR SOLUTION ORAL at 11:07

## 2019-01-01 RX ADMIN — Medication 1 DROP(S): at 17:37

## 2019-01-01 RX ADMIN — VALACYCLOVIR 1000 MILLIGRAM(S): 500 TABLET, FILM COATED ORAL at 22:37

## 2019-01-01 RX ADMIN — MORPHINE SULFATE 2 MILLIGRAM(S): 50 CAPSULE, EXTENDED RELEASE ORAL at 07:15

## 2019-01-01 RX ADMIN — OXYCODONE AND ACETAMINOPHEN 2 TABLET(S): 5; 325 TABLET ORAL at 06:44

## 2019-01-01 RX ADMIN — Medication 1 DROP(S): at 05:05

## 2019-01-01 RX ADMIN — PANTOPRAZOLE SODIUM 40 MILLIGRAM(S): 20 TABLET, DELAYED RELEASE ORAL at 18:23

## 2019-01-01 RX ADMIN — Medication 1 APPLICATION(S): at 23:35

## 2019-01-01 RX ADMIN — Medication 1 DROP(S): at 21:29

## 2019-01-01 RX ADMIN — Medication 20 MILLIGRAM(S): at 05:07

## 2019-01-01 RX ADMIN — Medication 20 MILLIGRAM(S): at 21:36

## 2019-01-01 RX ADMIN — OXYCODONE HYDROCHLORIDE 10 MILLIGRAM(S): 5 TABLET ORAL at 12:40

## 2019-01-01 RX ADMIN — I.V. FAT EMULSION 20.83 GM/KG/DAY: 20 EMULSION INTRAVENOUS at 21:19

## 2019-01-01 RX ADMIN — Medication 500 MILLIGRAM(S): at 14:44

## 2019-01-01 RX ADMIN — PANTOPRAZOLE SODIUM 40 MILLIGRAM(S): 20 TABLET, DELAYED RELEASE ORAL at 17:51

## 2019-01-01 RX ADMIN — OXYCODONE AND ACETAMINOPHEN 2 TABLET(S): 5; 325 TABLET ORAL at 14:04

## 2019-01-01 RX ADMIN — CHLORHEXIDINE GLUCONATE 15 MILLILITER(S): 213 SOLUTION TOPICAL at 05:16

## 2019-01-01 RX ADMIN — VALACYCLOVIR 1000 MILLIGRAM(S): 500 TABLET, FILM COATED ORAL at 12:53

## 2019-01-01 RX ADMIN — CEFTRIAXONE 1000 MILLIGRAM(S): 500 INJECTION, POWDER, FOR SOLUTION INTRAMUSCULAR; INTRAVENOUS at 16:17

## 2019-01-01 RX ADMIN — DULOXETINE HYDROCHLORIDE 20 MILLIGRAM(S): 30 CAPSULE, DELAYED RELEASE ORAL at 18:27

## 2019-01-01 RX ADMIN — MORPHINE SULFATE 2 MILLIGRAM(S): 50 CAPSULE, EXTENDED RELEASE ORAL at 18:02

## 2019-01-01 RX ADMIN — MEROPENEM 100 MILLIGRAM(S): 1 INJECTION INTRAVENOUS at 15:43

## 2019-01-01 RX ADMIN — SODIUM CHLORIDE 3 MILLILITER(S): 9 INJECTION INTRAMUSCULAR; INTRAVENOUS; SUBCUTANEOUS at 12:32

## 2019-01-01 RX ADMIN — ZOLPIDEM TARTRATE 5 MILLIGRAM(S): 10 TABLET ORAL at 23:31

## 2019-01-01 RX ADMIN — Medication 100 MILLIGRAM(S): at 22:37

## 2019-01-01 RX ADMIN — Medication 1 DROP(S): at 06:44

## 2019-01-01 RX ADMIN — Medication 20 MILLIGRAM(S): at 05:49

## 2019-01-01 RX ADMIN — MORPHINE SULFATE 2 MILLIGRAM(S): 50 CAPSULE, EXTENDED RELEASE ORAL at 22:01

## 2019-01-01 RX ADMIN — Medication 650 MILLIGRAM(S): at 15:32

## 2019-01-01 RX ADMIN — Medication 500 MILLIGRAM(S): at 11:42

## 2019-01-01 RX ADMIN — OXYCODONE HYDROCHLORIDE 10 MILLIGRAM(S): 5 TABLET ORAL at 20:01

## 2019-01-01 RX ADMIN — GABAPENTIN 600 MILLIGRAM(S): 400 CAPSULE ORAL at 06:19

## 2019-01-01 RX ADMIN — OXYCODONE AND ACETAMINOPHEN 1 TABLET(S): 5; 325 TABLET ORAL at 04:38

## 2019-01-01 RX ADMIN — Medication 100 MILLIGRAM(S): at 05:05

## 2019-01-01 RX ADMIN — OXYCODONE HYDROCHLORIDE 10 MILLIGRAM(S): 5 TABLET ORAL at 00:16

## 2019-01-01 RX ADMIN — Medication 1000 MILLIGRAM(S): at 06:20

## 2019-01-01 RX ADMIN — Medication 100 MILLIGRAM(S): at 02:10

## 2019-01-01 RX ADMIN — Medication 20 MILLIGRAM(S): at 10:04

## 2019-01-01 RX ADMIN — OXYCODONE AND ACETAMINOPHEN 1 TABLET(S): 5; 325 TABLET ORAL at 09:14

## 2019-01-01 RX ADMIN — DULOXETINE HYDROCHLORIDE 20 MILLIGRAM(S): 30 CAPSULE, DELAYED RELEASE ORAL at 13:27

## 2019-01-01 RX ADMIN — Medication 20 MILLIGRAM(S): at 17:32

## 2019-01-01 RX ADMIN — PIPERACILLIN AND TAZOBACTAM 25 GRAM(S): 4; .5 INJECTION, POWDER, LYOPHILIZED, FOR SOLUTION INTRAVENOUS at 12:48

## 2019-01-01 RX ADMIN — FENTANYL CITRATE 50 MICROGRAM(S): 50 INJECTION INTRAVENOUS at 19:00

## 2019-01-01 RX ADMIN — OXYCODONE HYDROCHLORIDE 10 MILLIGRAM(S): 5 TABLET ORAL at 21:27

## 2019-01-01 RX ADMIN — Medication 10 MILLIGRAM(S): at 13:06

## 2019-01-01 RX ADMIN — DEXMEDETOMIDINE HYDROCHLORIDE IN 0.9% SODIUM CHLORIDE 2.75 MICROGRAM(S)/KG/HR: 4 INJECTION INTRAVENOUS at 10:36

## 2019-01-01 RX ADMIN — Medication 500 MILLIGRAM(S): at 09:20

## 2019-01-01 RX ADMIN — GABAPENTIN 800 MILLIGRAM(S): 400 CAPSULE ORAL at 06:25

## 2019-01-01 RX ADMIN — MORPHINE SULFATE 2 MILLIGRAM(S): 50 CAPSULE, EXTENDED RELEASE ORAL at 22:00

## 2019-01-01 RX ADMIN — PANTOPRAZOLE SODIUM 10 MG/HR: 20 TABLET, DELAYED RELEASE ORAL at 14:32

## 2019-01-01 RX ADMIN — Medication 20 MILLIGRAM(S): at 06:01

## 2019-01-01 RX ADMIN — SODIUM CHLORIDE 3 MILLILITER(S): 9 INJECTION INTRAMUSCULAR; INTRAVENOUS; SUBCUTANEOUS at 13:48

## 2019-01-01 RX ADMIN — Medication 100 MILLIGRAM(S): at 06:18

## 2019-01-01 RX ADMIN — PANTOPRAZOLE SODIUM 40 MILLIGRAM(S): 20 TABLET, DELAYED RELEASE ORAL at 18:50

## 2019-01-01 RX ADMIN — OXYCODONE AND ACETAMINOPHEN 2 TABLET(S): 5; 325 TABLET ORAL at 22:41

## 2019-01-01 RX ADMIN — OXYCODONE HYDROCHLORIDE 10 MILLIGRAM(S): 5 TABLET ORAL at 19:33

## 2019-01-01 RX ADMIN — OXYCODONE HYDROCHLORIDE 10 MILLIGRAM(S): 5 TABLET ORAL at 07:00

## 2019-01-01 RX ADMIN — ATORVASTATIN CALCIUM 40 MILLIGRAM(S): 80 TABLET, FILM COATED ORAL at 21:10

## 2019-01-01 RX ADMIN — Medication 100 MILLIEQUIVALENT(S): at 10:47

## 2019-01-01 RX ADMIN — PANTOPRAZOLE SODIUM 40 MILLIGRAM(S): 20 TABLET, DELAYED RELEASE ORAL at 17:19

## 2019-01-01 RX ADMIN — Medication 1 DROP(S): at 13:05

## 2019-01-01 RX ADMIN — PANTOPRAZOLE SODIUM 40 MILLIGRAM(S): 20 TABLET, DELAYED RELEASE ORAL at 06:40

## 2019-01-01 RX ADMIN — GABAPENTIN 800 MILLIGRAM(S): 400 CAPSULE ORAL at 04:40

## 2019-01-01 RX ADMIN — Medication 200 MILLIGRAM(S): at 06:01

## 2019-01-01 RX ADMIN — PANTOPRAZOLE SODIUM 40 MILLIGRAM(S): 20 TABLET, DELAYED RELEASE ORAL at 16:35

## 2019-01-01 RX ADMIN — GABAPENTIN 800 MILLIGRAM(S): 400 CAPSULE ORAL at 06:08

## 2019-01-01 RX ADMIN — MORPHINE SULFATE 2 MILLIGRAM(S): 50 CAPSULE, EXTENDED RELEASE ORAL at 06:49

## 2019-01-01 RX ADMIN — Medication 20 MILLIGRAM(S): at 06:24

## 2019-01-01 RX ADMIN — Medication 1 DROP(S): at 12:50

## 2019-01-01 RX ADMIN — MORPHINE SULFATE 2 MILLIGRAM(S): 50 CAPSULE, EXTENDED RELEASE ORAL at 00:07

## 2019-01-01 RX ADMIN — MEROPENEM 100 MILLIGRAM(S): 1 INJECTION INTRAVENOUS at 05:10

## 2019-01-01 RX ADMIN — Medication 1 DROP(S): at 03:27

## 2019-01-01 RX ADMIN — MORPHINE SULFATE 2 MILLIGRAM(S): 50 CAPSULE, EXTENDED RELEASE ORAL at 07:28

## 2019-01-01 RX ADMIN — Medication 200 MILLIGRAM(S): at 17:15

## 2019-01-01 RX ADMIN — ZOLPIDEM TARTRATE 5 MILLIGRAM(S): 10 TABLET ORAL at 23:40

## 2019-01-01 RX ADMIN — MORPHINE SULFATE 2 MILLIGRAM(S): 50 CAPSULE, EXTENDED RELEASE ORAL at 11:34

## 2019-01-01 RX ADMIN — CHLORHEXIDINE GLUCONATE 1 APPLICATION(S): 213 SOLUTION TOPICAL at 09:20

## 2019-01-01 RX ADMIN — SODIUM CHLORIDE 60 MILLILITER(S): 9 INJECTION, SOLUTION INTRAVENOUS at 22:02

## 2019-01-01 RX ADMIN — AMLODIPINE BESYLATE 5 MILLIGRAM(S): 2.5 TABLET ORAL at 05:18

## 2019-01-01 RX ADMIN — Medication 650 MILLIGRAM(S): at 22:31

## 2019-01-01 RX ADMIN — MORPHINE SULFATE 2 MILLIGRAM(S): 50 CAPSULE, EXTENDED RELEASE ORAL at 22:54

## 2019-01-01 RX ADMIN — Medication 1 DROP(S): at 12:08

## 2019-01-01 RX ADMIN — Medication 20 MILLIGRAM(S): at 17:00

## 2019-01-01 RX ADMIN — SODIUM CHLORIDE 3 MILLILITER(S): 9 INJECTION INTRAMUSCULAR; INTRAVENOUS; SUBCUTANEOUS at 07:13

## 2019-01-01 RX ADMIN — Medication 20 MILLIGRAM(S): at 05:36

## 2019-01-01 RX ADMIN — LIDOCAINE 1 PATCH: 4 CREAM TOPICAL at 12:00

## 2019-01-01 RX ADMIN — OXYCODONE HYDROCHLORIDE 10 MILLIGRAM(S): 5 TABLET ORAL at 10:26

## 2019-01-01 RX ADMIN — ATORVASTATIN CALCIUM 40 MILLIGRAM(S): 80 TABLET, FILM COATED ORAL at 22:34

## 2019-01-01 RX ADMIN — PANTOPRAZOLE SODIUM 10 MG/HR: 20 TABLET, DELAYED RELEASE ORAL at 23:02

## 2019-01-01 RX ADMIN — DULOXETINE HYDROCHLORIDE 20 MILLIGRAM(S): 30 CAPSULE, DELAYED RELEASE ORAL at 06:04

## 2019-01-01 RX ADMIN — Medication 1 DROP(S): at 07:46

## 2019-01-01 RX ADMIN — PANTOPRAZOLE SODIUM 40 MILLIGRAM(S): 20 TABLET, DELAYED RELEASE ORAL at 18:01

## 2019-01-01 RX ADMIN — Medication 1 DROP(S): at 01:55

## 2019-01-01 RX ADMIN — GABAPENTIN 800 MILLIGRAM(S): 400 CAPSULE ORAL at 23:01

## 2019-01-01 RX ADMIN — SODIUM CHLORIDE 90 MILLILITER(S): 9 INJECTION, SOLUTION INTRAVENOUS at 01:32

## 2019-01-01 RX ADMIN — Medication 20 MILLIGRAM(S): at 23:51

## 2019-01-01 RX ADMIN — PIPERACILLIN AND TAZOBACTAM 25 GRAM(S): 4; .5 INJECTION, POWDER, LYOPHILIZED, FOR SOLUTION INTRAVENOUS at 13:14

## 2019-01-01 RX ADMIN — I.V. FAT EMULSION 20.83 GM/KG/DAY: 20 EMULSION INTRAVENOUS at 17:59

## 2019-01-01 RX ADMIN — OXYCODONE HYDROCHLORIDE 10 MILLIGRAM(S): 5 TABLET ORAL at 17:34

## 2019-01-01 RX ADMIN — PANTOPRAZOLE SODIUM 40 MILLIGRAM(S): 20 TABLET, DELAYED RELEASE ORAL at 06:38

## 2019-01-01 RX ADMIN — DULOXETINE HYDROCHLORIDE 30 MILLIGRAM(S): 30 CAPSULE, DELAYED RELEASE ORAL at 17:17

## 2019-01-01 RX ADMIN — VALACYCLOVIR 1000 MILLIGRAM(S): 500 TABLET, FILM COATED ORAL at 21:50

## 2019-01-01 RX ADMIN — Medication 20 MILLIGRAM(S): at 19:27

## 2019-01-01 RX ADMIN — LIDOCAINE 1 PATCH: 4 CREAM TOPICAL at 13:19

## 2019-01-01 RX ADMIN — POLYETHYLENE GLYCOL 3350 17 GRAM(S): 17 POWDER, FOR SOLUTION ORAL at 18:04

## 2019-01-01 RX ADMIN — MORPHINE SULFATE 4 MILLIGRAM(S): 50 CAPSULE, EXTENDED RELEASE ORAL at 15:16

## 2019-01-01 RX ADMIN — Medication 2 DROP(S): at 23:09

## 2019-01-01 RX ADMIN — SODIUM CHLORIDE 3 MILLILITER(S): 9 INJECTION INTRAMUSCULAR; INTRAVENOUS; SUBCUTANEOUS at 21:35

## 2019-01-01 RX ADMIN — Medication 20 MILLIGRAM(S): at 17:45

## 2019-01-01 RX ADMIN — SODIUM CHLORIDE 2000 MILLILITER(S): 9 INJECTION INTRAMUSCULAR; INTRAVENOUS; SUBCUTANEOUS at 11:38

## 2019-01-01 RX ADMIN — DULOXETINE HYDROCHLORIDE 30 MILLIGRAM(S): 30 CAPSULE, DELAYED RELEASE ORAL at 05:50

## 2019-01-01 RX ADMIN — OXYCODONE HYDROCHLORIDE 10 MILLIGRAM(S): 5 TABLET ORAL at 18:06

## 2019-01-01 RX ADMIN — DULOXETINE HYDROCHLORIDE 20 MILLIGRAM(S): 30 CAPSULE, DELAYED RELEASE ORAL at 11:25

## 2019-01-01 RX ADMIN — VALACYCLOVIR 1000 MILLIGRAM(S): 500 TABLET, FILM COATED ORAL at 13:53

## 2019-01-01 RX ADMIN — Medication 20 MILLIGRAM(S): at 06:29

## 2019-01-01 RX ADMIN — ZOLPIDEM TARTRATE 5 MILLIGRAM(S): 10 TABLET ORAL at 23:48

## 2019-01-01 RX ADMIN — Medication 650 MILLIGRAM(S): at 08:53

## 2019-01-01 RX ADMIN — CARBAMIDE PEROXIDE 10 DROP(S): 81.86 SOLUTION/ DROPS AURICULAR (OTIC) at 09:46

## 2019-01-01 RX ADMIN — PIPERACILLIN AND TAZOBACTAM 25 GRAM(S): 4; .5 INJECTION, POWDER, LYOPHILIZED, FOR SOLUTION INTRAVENOUS at 22:26

## 2019-01-01 RX ADMIN — OXYCODONE HYDROCHLORIDE 10 MILLIGRAM(S): 5 TABLET ORAL at 08:33

## 2019-01-01 RX ADMIN — VALACYCLOVIR 1000 MILLIGRAM(S): 500 TABLET, FILM COATED ORAL at 22:27

## 2019-01-01 RX ADMIN — ATORVASTATIN CALCIUM 40 MILLIGRAM(S): 80 TABLET, FILM COATED ORAL at 23:18

## 2019-01-01 RX ADMIN — Medication 20 MILLIGRAM(S): at 12:19

## 2019-01-01 RX ADMIN — SODIUM CHLORIDE 2000 MILLILITER(S): 9 INJECTION INTRAMUSCULAR; INTRAVENOUS; SUBCUTANEOUS at 20:30

## 2019-01-01 RX ADMIN — Medication 1 DROP(S): at 00:11

## 2019-01-01 RX ADMIN — MEROPENEM 100 MILLIGRAM(S): 1 INJECTION INTRAVENOUS at 08:22

## 2019-01-01 RX ADMIN — Medication 100 GRAM(S): at 10:52

## 2019-01-01 RX ADMIN — Medication 650 MILLIGRAM(S): at 07:55

## 2019-01-01 RX ADMIN — Medication 1 APPLICATION(S): at 05:26

## 2019-01-01 RX ADMIN — CHLORHEXIDINE GLUCONATE 15 MILLILITER(S): 213 SOLUTION TOPICAL at 18:15

## 2019-01-01 RX ADMIN — Medication 1 DROP(S): at 11:06

## 2019-01-01 RX ADMIN — Medication 1 DROP(S): at 12:31

## 2019-01-01 RX ADMIN — OXYCODONE HYDROCHLORIDE 10 MILLIGRAM(S): 5 TABLET ORAL at 18:04

## 2019-01-01 RX ADMIN — Medication 1 DROP(S): at 10:10

## 2019-01-01 RX ADMIN — LIDOCAINE 1 PATCH: 4 CREAM TOPICAL at 13:25

## 2019-01-01 RX ADMIN — PROPOFOL 1.65 MICROGRAM(S)/KG/MIN: 10 INJECTION, EMULSION INTRAVENOUS at 04:00

## 2019-01-01 RX ADMIN — DULOXETINE HYDROCHLORIDE 30 MILLIGRAM(S): 30 CAPSULE, DELAYED RELEASE ORAL at 17:52

## 2019-01-01 RX ADMIN — Medication 1 DROP(S): at 23:31

## 2019-01-01 RX ADMIN — Medication 1 DROP(S): at 08:19

## 2019-01-01 RX ADMIN — Medication 1 TABLET(S): at 12:48

## 2019-01-01 RX ADMIN — Medication 1 DROP(S): at 22:15

## 2019-01-01 RX ADMIN — CHLORHEXIDINE GLUCONATE 1 APPLICATION(S): 213 SOLUTION TOPICAL at 06:13

## 2019-01-01 RX ADMIN — MORPHINE SULFATE 2 MILLIGRAM(S): 50 CAPSULE, EXTENDED RELEASE ORAL at 22:25

## 2019-01-01 RX ADMIN — GABAPENTIN 600 MILLIGRAM(S): 400 CAPSULE ORAL at 05:01

## 2019-01-01 RX ADMIN — DULOXETINE HYDROCHLORIDE 20 MILLIGRAM(S): 30 CAPSULE, DELAYED RELEASE ORAL at 05:08

## 2019-01-01 RX ADMIN — PANTOPRAZOLE SODIUM 40 MILLIGRAM(S): 20 TABLET, DELAYED RELEASE ORAL at 06:26

## 2019-01-01 RX ADMIN — Medication 20 MILLIGRAM(S): at 06:49

## 2019-01-01 RX ADMIN — CHLORHEXIDINE GLUCONATE 1 APPLICATION(S): 213 SOLUTION TOPICAL at 11:24

## 2019-01-01 RX ADMIN — Medication 975 MILLIGRAM(S): at 23:30

## 2019-01-01 RX ADMIN — HYDROMORPHONE HYDROCHLORIDE 0.5 MILLIGRAM(S): 2 INJECTION INTRAMUSCULAR; INTRAVENOUS; SUBCUTANEOUS at 04:20

## 2019-01-01 RX ADMIN — Medication 2 DROP(S): at 05:56

## 2019-01-01 RX ADMIN — LIDOCAINE 1 PATCH: 4 CREAM TOPICAL at 12:55

## 2019-01-01 RX ADMIN — Medication 650 MILLIGRAM(S): at 19:03

## 2019-01-01 RX ADMIN — Medication 1 DROP(S): at 05:21

## 2019-01-01 RX ADMIN — Medication 500 MILLIGRAM(S): at 12:19

## 2019-01-01 RX ADMIN — OXYCODONE HYDROCHLORIDE 10 MILLIGRAM(S): 5 TABLET ORAL at 12:30

## 2019-01-01 RX ADMIN — Medication 20 MILLIGRAM(S): at 06:39

## 2019-01-01 RX ADMIN — SODIUM CHLORIDE 75 MILLILITER(S): 9 INJECTION INTRAMUSCULAR; INTRAVENOUS; SUBCUTANEOUS at 02:27

## 2019-01-01 RX ADMIN — DULOXETINE HYDROCHLORIDE 30 MILLIGRAM(S): 30 CAPSULE, DELAYED RELEASE ORAL at 05:27

## 2019-01-01 RX ADMIN — CEFTRIAXONE 1000 MILLIGRAM(S): 500 INJECTION, POWDER, FOR SOLUTION INTRAMUSCULAR; INTRAVENOUS at 21:12

## 2019-01-01 RX ADMIN — Medication 1 DROP(S): at 16:29

## 2019-01-01 RX ADMIN — GABAPENTIN 600 MILLIGRAM(S): 400 CAPSULE ORAL at 22:07

## 2019-01-01 RX ADMIN — VALACYCLOVIR 1000 MILLIGRAM(S): 500 TABLET, FILM COATED ORAL at 22:26

## 2019-01-01 RX ADMIN — OXYCODONE HYDROCHLORIDE 10 MILLIGRAM(S): 5 TABLET ORAL at 05:42

## 2019-01-01 RX ADMIN — DULOXETINE HYDROCHLORIDE 20 MILLIGRAM(S): 30 CAPSULE, DELAYED RELEASE ORAL at 06:19

## 2019-01-01 RX ADMIN — LIDOCAINE 1 PATCH: 4 CREAM TOPICAL at 19:49

## 2019-01-01 RX ADMIN — Medication 100 MILLIEQUIVALENT(S): at 05:17

## 2019-01-01 RX ADMIN — Medication 1 DROP(S): at 04:57

## 2019-01-01 RX ADMIN — ZOLPIDEM TARTRATE 5 MILLIGRAM(S): 10 TABLET ORAL at 21:40

## 2019-01-01 RX ADMIN — GABAPENTIN 600 MILLIGRAM(S): 400 CAPSULE ORAL at 14:49

## 2019-01-01 RX ADMIN — Medication 50 GRAM(S): at 16:02

## 2019-01-01 RX ADMIN — PIPERACILLIN AND TAZOBACTAM 200 GRAM(S): 4; .5 INJECTION, POWDER, LYOPHILIZED, FOR SOLUTION INTRAVENOUS at 00:46

## 2019-01-01 RX ADMIN — MEROPENEM 100 MILLIGRAM(S): 1 INJECTION INTRAVENOUS at 13:10

## 2019-01-01 RX ADMIN — GABAPENTIN 600 MILLIGRAM(S): 400 CAPSULE ORAL at 22:04

## 2019-01-01 RX ADMIN — PANTOPRAZOLE SODIUM 40 MILLIGRAM(S): 20 TABLET, DELAYED RELEASE ORAL at 05:58

## 2019-01-01 RX ADMIN — PANTOPRAZOLE SODIUM 40 MILLIGRAM(S): 20 TABLET, DELAYED RELEASE ORAL at 05:38

## 2019-01-01 RX ADMIN — Medication 1 MILLIGRAM(S): at 20:56

## 2019-01-01 RX ADMIN — HYDROMORPHONE HYDROCHLORIDE 0.5 MILLIGRAM(S): 2 INJECTION INTRAMUSCULAR; INTRAVENOUS; SUBCUTANEOUS at 07:30

## 2019-01-01 RX ADMIN — SODIUM CHLORIDE 3 MILLILITER(S): 9 INJECTION INTRAMUSCULAR; INTRAVENOUS; SUBCUTANEOUS at 05:07

## 2019-01-01 RX ADMIN — Medication 1000 MILLIGRAM(S): at 22:45

## 2019-01-01 RX ADMIN — PANTOPRAZOLE SODIUM 40 MILLIGRAM(S): 20 TABLET, DELAYED RELEASE ORAL at 15:42

## 2019-01-01 RX ADMIN — Medication 1 DROP(S): at 13:16

## 2019-01-01 RX ADMIN — Medication 105 MILLIGRAM(S): at 17:17

## 2019-01-01 RX ADMIN — OXYCODONE HYDROCHLORIDE 10 MILLIGRAM(S): 5 TABLET ORAL at 21:41

## 2019-01-01 RX ADMIN — GABAPENTIN 800 MILLIGRAM(S): 400 CAPSULE ORAL at 22:00

## 2019-01-01 RX ADMIN — MORPHINE SULFATE 2 MILLIGRAM(S): 50 CAPSULE, EXTENDED RELEASE ORAL at 14:09

## 2019-01-01 RX ADMIN — HYDROMORPHONE HYDROCHLORIDE 0.5 MILLIGRAM(S): 2 INJECTION INTRAMUSCULAR; INTRAVENOUS; SUBCUTANEOUS at 12:09

## 2019-01-01 RX ADMIN — GABAPENTIN 800 MILLIGRAM(S): 400 CAPSULE ORAL at 04:32

## 2019-01-01 RX ADMIN — VALACYCLOVIR 1000 MILLIGRAM(S): 500 TABLET, FILM COATED ORAL at 14:04

## 2019-01-01 RX ADMIN — MORPHINE SULFATE 4 MILLIGRAM(S): 50 CAPSULE, EXTENDED RELEASE ORAL at 16:05

## 2019-01-01 RX ADMIN — Medication 1 DROP(S): at 21:57

## 2019-01-01 RX ADMIN — VALACYCLOVIR 1000 MILLIGRAM(S): 500 TABLET, FILM COATED ORAL at 05:15

## 2019-01-01 RX ADMIN — HALOPERIDOL DECANOATE 2.5 MILLIGRAM(S): 100 INJECTION INTRAMUSCULAR at 03:48

## 2019-01-01 RX ADMIN — OXYCODONE HYDROCHLORIDE 10 MILLIGRAM(S): 5 TABLET ORAL at 06:39

## 2019-01-01 RX ADMIN — PANTOPRAZOLE SODIUM 40 MILLIGRAM(S): 20 TABLET, DELAYED RELEASE ORAL at 06:23

## 2019-01-01 RX ADMIN — MEROPENEM 100 MILLIGRAM(S): 1 INJECTION INTRAVENOUS at 07:16

## 2019-01-01 RX ADMIN — DULOXETINE HYDROCHLORIDE 30 MILLIGRAM(S): 30 CAPSULE, DELAYED RELEASE ORAL at 18:37

## 2019-01-01 RX ADMIN — CHLORHEXIDINE GLUCONATE 15 MILLILITER(S): 213 SOLUTION TOPICAL at 05:11

## 2019-01-01 RX ADMIN — OXYCODONE HYDROCHLORIDE 10 MILLIGRAM(S): 5 TABLET ORAL at 23:45

## 2019-01-01 RX ADMIN — POLYETHYLENE GLYCOL 3350 17 GRAM(S): 17 POWDER, FOR SOLUTION ORAL at 17:27

## 2019-01-01 RX ADMIN — Medication 500 MILLIGRAM(S): at 09:30

## 2019-01-01 RX ADMIN — SODIUM CHLORIDE 80 MILLILITER(S): 9 INJECTION, SOLUTION INTRAVENOUS at 09:47

## 2019-01-01 RX ADMIN — Medication 50 GRAM(S): at 09:16

## 2019-01-01 RX ADMIN — Medication 20 MILLIGRAM(S): at 05:13

## 2019-01-01 RX ADMIN — VALACYCLOVIR 1000 MILLIGRAM(S): 500 TABLET, FILM COATED ORAL at 05:22

## 2019-01-01 RX ADMIN — Medication 1 DROP(S): at 12:03

## 2019-01-01 RX ADMIN — Medication 1 DROP(S): at 17:33

## 2019-01-01 RX ADMIN — Medication 1 DROP(S): at 23:51

## 2019-01-01 RX ADMIN — ZOLPIDEM TARTRATE 5 MILLIGRAM(S): 10 TABLET ORAL at 00:10

## 2019-01-01 RX ADMIN — Medication 1 DROP(S): at 07:59

## 2019-01-01 RX ADMIN — DULOXETINE HYDROCHLORIDE 20 MILLIGRAM(S): 30 CAPSULE, DELAYED RELEASE ORAL at 17:23

## 2019-01-01 RX ADMIN — OXYCODONE HYDROCHLORIDE 10 MILLIGRAM(S): 5 TABLET ORAL at 17:04

## 2019-01-01 RX ADMIN — Medication 1 DROP(S): at 22:08

## 2019-01-01 RX ADMIN — Medication 1 TABLET(S): at 12:02

## 2019-01-01 RX ADMIN — Medication 200 MILLIGRAM(S): at 05:08

## 2019-01-01 RX ADMIN — CHLORHEXIDINE GLUCONATE 15 MILLILITER(S): 213 SOLUTION TOPICAL at 18:09

## 2019-01-01 RX ADMIN — Medication 260 MILLIGRAM(S): at 08:29

## 2019-01-01 RX ADMIN — PANTOPRAZOLE SODIUM 40 MILLIGRAM(S): 20 TABLET, DELAYED RELEASE ORAL at 08:45

## 2019-01-01 RX ADMIN — Medication 1 DROP(S): at 00:45

## 2019-01-01 RX ADMIN — Medication 40 MILLIEQUIVALENT(S): at 21:34

## 2019-01-01 RX ADMIN — Medication 1 EACH: at 17:58

## 2019-01-01 RX ADMIN — GABAPENTIN 300 MILLIGRAM(S): 400 CAPSULE ORAL at 21:48

## 2019-01-01 RX ADMIN — Medication 1 ENEMA: at 17:30

## 2019-01-01 RX ADMIN — Medication 1 DROP(S): at 06:02

## 2019-01-01 RX ADMIN — LIDOCAINE 1 PATCH: 4 CREAM TOPICAL at 11:16

## 2019-01-01 RX ADMIN — Medication 200 MILLIGRAM(S): at 06:13

## 2019-01-01 RX ADMIN — GABAPENTIN 600 MILLIGRAM(S): 400 CAPSULE ORAL at 05:44

## 2019-01-01 RX ADMIN — MORPHINE SULFATE 2 MILLIGRAM(S): 50 CAPSULE, EXTENDED RELEASE ORAL at 14:01

## 2019-01-01 RX ADMIN — MORPHINE SULFATE 2 MILLIGRAM(S): 50 CAPSULE, EXTENDED RELEASE ORAL at 06:07

## 2019-01-01 RX ADMIN — SODIUM CHLORIDE 3 MILLILITER(S): 9 INJECTION INTRAMUSCULAR; INTRAVENOUS; SUBCUTANEOUS at 22:04

## 2019-01-01 RX ADMIN — Medication 20 MILLIGRAM(S): at 18:42

## 2019-01-01 RX ADMIN — Medication 1 DROP(S): at 18:43

## 2019-01-01 RX ADMIN — PROPOFOL 1.65 MICROGRAM(S)/KG/MIN: 10 INJECTION, EMULSION INTRAVENOUS at 14:28

## 2019-01-01 RX ADMIN — PANTOPRAZOLE SODIUM 40 MILLIGRAM(S): 20 TABLET, DELAYED RELEASE ORAL at 18:20

## 2019-01-01 RX ADMIN — Medication 1 DROP(S): at 16:11

## 2019-01-01 RX ADMIN — GABAPENTIN 800 MILLIGRAM(S): 400 CAPSULE ORAL at 05:55

## 2019-01-01 RX ADMIN — GABAPENTIN 600 MILLIGRAM(S): 400 CAPSULE ORAL at 05:34

## 2019-01-01 RX ADMIN — Medication 1000 MILLIGRAM(S): at 06:08

## 2019-01-01 RX ADMIN — Medication 1 DROP(S): at 21:15

## 2019-01-01 RX ADMIN — OXYCODONE HYDROCHLORIDE 10 MILLIGRAM(S): 5 TABLET ORAL at 08:47

## 2019-01-01 RX ADMIN — Medication 20 MILLIGRAM(S): at 06:37

## 2019-01-01 RX ADMIN — LIDOCAINE 1 PATCH: 4 CREAM TOPICAL at 00:03

## 2019-01-01 RX ADMIN — OXYCODONE HYDROCHLORIDE 10 MILLIGRAM(S): 5 TABLET ORAL at 07:43

## 2019-01-01 RX ADMIN — Medication 20 MILLIGRAM(S): at 16:49

## 2019-01-01 RX ADMIN — Medication 650 MILLIGRAM(S): at 04:56

## 2019-01-01 RX ADMIN — GABAPENTIN 600 MILLIGRAM(S): 400 CAPSULE ORAL at 22:18

## 2019-01-01 RX ADMIN — Medication 20 MILLIGRAM(S): at 21:10

## 2019-01-01 RX ADMIN — OXYCODONE HYDROCHLORIDE 10 MILLIGRAM(S): 5 TABLET ORAL at 23:31

## 2019-01-01 RX ADMIN — SODIUM CHLORIDE 150 MILLILITER(S): 9 INJECTION, SOLUTION INTRAVENOUS at 08:30

## 2019-01-01 RX ADMIN — MORPHINE SULFATE 4 MILLIGRAM(S): 50 CAPSULE, EXTENDED RELEASE ORAL at 15:15

## 2019-01-01 RX ADMIN — OXYCODONE HYDROCHLORIDE 10 MILLIGRAM(S): 5 TABLET ORAL at 19:06

## 2019-01-01 RX ADMIN — OXYCODONE HYDROCHLORIDE 10 MILLIGRAM(S): 5 TABLET ORAL at 17:39

## 2019-01-01 RX ADMIN — FENTANYL CITRATE 50 MICROGRAM(S): 50 INJECTION INTRAVENOUS at 17:15

## 2019-01-01 RX ADMIN — VALACYCLOVIR 1000 MILLIGRAM(S): 500 TABLET, FILM COATED ORAL at 11:46

## 2019-01-01 RX ADMIN — DULOXETINE HYDROCHLORIDE 30 MILLIGRAM(S): 30 CAPSULE, DELAYED RELEASE ORAL at 18:19

## 2019-01-01 RX ADMIN — GABAPENTIN 600 MILLIGRAM(S): 400 CAPSULE ORAL at 21:35

## 2019-01-01 RX ADMIN — VALACYCLOVIR 1000 MILLIGRAM(S): 500 TABLET, FILM COATED ORAL at 17:45

## 2019-01-01 RX ADMIN — LIDOCAINE 1 PATCH: 4 CREAM TOPICAL at 12:33

## 2019-01-01 RX ADMIN — Medication 1 DROP(S): at 00:54

## 2019-01-01 RX ADMIN — VALACYCLOVIR 1000 MILLIGRAM(S): 500 TABLET, FILM COATED ORAL at 22:39

## 2019-01-01 RX ADMIN — AMLODIPINE BESYLATE 5 MILLIGRAM(S): 2.5 TABLET ORAL at 06:40

## 2019-01-01 RX ADMIN — Medication 1 APPLICATION(S): at 13:29

## 2019-01-01 RX ADMIN — OXYCODONE HYDROCHLORIDE 10 MILLIGRAM(S): 5 TABLET ORAL at 16:10

## 2019-01-01 RX ADMIN — VALACYCLOVIR 1000 MILLIGRAM(S): 500 TABLET, FILM COATED ORAL at 05:02

## 2019-01-01 RX ADMIN — PANTOPRAZOLE SODIUM 40 MILLIGRAM(S): 20 TABLET, DELAYED RELEASE ORAL at 05:31

## 2019-01-01 RX ADMIN — DULOXETINE HYDROCHLORIDE 20 MILLIGRAM(S): 30 CAPSULE, DELAYED RELEASE ORAL at 05:42

## 2019-01-01 RX ADMIN — Medication 1000 MILLIGRAM(S): at 03:00

## 2019-01-01 RX ADMIN — POLYETHYLENE GLYCOL 3350 17 GRAM(S): 17 POWDER, FOR SOLUTION ORAL at 05:49

## 2019-01-01 RX ADMIN — ATORVASTATIN CALCIUM 40 MILLIGRAM(S): 80 TABLET, FILM COATED ORAL at 22:03

## 2019-01-01 RX ADMIN — Medication 20 MILLIGRAM(S): at 12:49

## 2019-01-01 RX ADMIN — Medication 1 DROP(S): at 05:50

## 2019-01-01 RX ADMIN — PANTOPRAZOLE SODIUM 10 MG/HR: 20 TABLET, DELAYED RELEASE ORAL at 20:27

## 2019-01-01 RX ADMIN — CEFTRIAXONE 100 MILLIGRAM(S): 500 INJECTION, POWDER, FOR SOLUTION INTRAMUSCULAR; INTRAVENOUS at 19:24

## 2019-01-01 RX ADMIN — Medication 650 MILLIGRAM(S): at 12:09

## 2019-01-01 RX ADMIN — CHLORHEXIDINE GLUCONATE 1 APPLICATION(S): 213 SOLUTION TOPICAL at 09:41

## 2019-01-01 RX ADMIN — CEFTRIAXONE 1000 MILLIGRAM(S): 500 INJECTION, POWDER, FOR SOLUTION INTRAMUSCULAR; INTRAVENOUS at 10:23

## 2019-01-01 RX ADMIN — Medication 20 MILLIGRAM(S): at 10:46

## 2019-01-01 RX ADMIN — LIDOCAINE 1 PATCH: 4 CREAM TOPICAL at 02:00

## 2019-01-01 RX ADMIN — ZOLPIDEM TARTRATE 5 MILLIGRAM(S): 10 TABLET ORAL at 23:55

## 2019-01-01 RX ADMIN — OXYCODONE HYDROCHLORIDE 10 MILLIGRAM(S): 5 TABLET ORAL at 20:04

## 2019-01-01 RX ADMIN — GABAPENTIN 800 MILLIGRAM(S): 400 CAPSULE ORAL at 22:34

## 2019-01-01 RX ADMIN — Medication 1000 MILLIGRAM(S): at 13:19

## 2019-01-01 RX ADMIN — GABAPENTIN 600 MILLIGRAM(S): 400 CAPSULE ORAL at 06:40

## 2019-01-01 RX ADMIN — CHLORHEXIDINE GLUCONATE 15 MILLILITER(S): 213 SOLUTION TOPICAL at 17:33

## 2019-01-01 RX ADMIN — GABAPENTIN 600 MILLIGRAM(S): 400 CAPSULE ORAL at 13:49

## 2019-01-01 RX ADMIN — Medication 1 PACKET(S): at 06:09

## 2019-01-01 RX ADMIN — Medication 1 DROP(S): at 06:08

## 2019-01-01 RX ADMIN — Medication 1 DROP(S): at 01:32

## 2019-01-01 RX ADMIN — Medication 200 MILLIGRAM(S): at 06:40

## 2019-01-01 RX ADMIN — GABAPENTIN 800 MILLIGRAM(S): 400 CAPSULE ORAL at 06:18

## 2019-01-01 RX ADMIN — GABAPENTIN 800 MILLIGRAM(S): 400 CAPSULE ORAL at 21:53

## 2019-01-01 RX ADMIN — PANTOPRAZOLE SODIUM 10 MG/HR: 20 TABLET, DELAYED RELEASE ORAL at 07:17

## 2019-01-01 RX ADMIN — Medication 1 DROP(S): at 06:52

## 2019-01-01 RX ADMIN — OXYCODONE AND ACETAMINOPHEN 2 TABLET(S): 5; 325 TABLET ORAL at 15:00

## 2019-01-01 RX ADMIN — GABAPENTIN 600 MILLIGRAM(S): 400 CAPSULE ORAL at 17:23

## 2019-01-01 RX ADMIN — Medication 100 MILLIGRAM(S): at 17:14

## 2019-01-01 RX ADMIN — OXYCODONE HYDROCHLORIDE 10 MILLIGRAM(S): 5 TABLET ORAL at 06:00

## 2019-01-01 RX ADMIN — VALACYCLOVIR 1000 MILLIGRAM(S): 500 TABLET, FILM COATED ORAL at 21:11

## 2019-01-01 RX ADMIN — CHLORHEXIDINE GLUCONATE 15 MILLILITER(S): 213 SOLUTION TOPICAL at 17:58

## 2019-01-01 RX ADMIN — Medication 200 MILLIGRAM(S): at 05:53

## 2019-01-01 RX ADMIN — Medication 100 MILLIGRAM(S): at 18:14

## 2019-01-01 RX ADMIN — SODIUM CHLORIDE 50 MILLILITER(S): 9 INJECTION, SOLUTION INTRAVENOUS at 11:15

## 2019-01-01 RX ADMIN — ZOLPIDEM TARTRATE 5 MILLIGRAM(S): 10 TABLET ORAL at 23:36

## 2019-01-01 RX ADMIN — ATORVASTATIN CALCIUM 40 MILLIGRAM(S): 80 TABLET, FILM COATED ORAL at 22:27

## 2019-01-01 RX ADMIN — DULOXETINE HYDROCHLORIDE 30 MILLIGRAM(S): 30 CAPSULE, DELAYED RELEASE ORAL at 17:14

## 2019-01-01 RX ADMIN — Medication 500 MILLIGRAM(S): at 13:20

## 2019-01-01 RX ADMIN — CHLORHEXIDINE GLUCONATE 15 MILLILITER(S): 213 SOLUTION TOPICAL at 18:06

## 2019-01-01 RX ADMIN — MORPHINE SULFATE 2 MILLIGRAM(S): 50 CAPSULE, EXTENDED RELEASE ORAL at 08:00

## 2019-01-01 RX ADMIN — GABAPENTIN 600 MILLIGRAM(S): 400 CAPSULE ORAL at 22:03

## 2019-01-01 RX ADMIN — Medication 650 MILLIGRAM(S): at 19:14

## 2019-01-01 RX ADMIN — DULOXETINE HYDROCHLORIDE 30 MILLIGRAM(S): 30 CAPSULE, DELAYED RELEASE ORAL at 05:11

## 2019-01-01 RX ADMIN — DULOXETINE HYDROCHLORIDE 20 MILLIGRAM(S): 30 CAPSULE, DELAYED RELEASE ORAL at 18:06

## 2019-01-01 RX ADMIN — Medication 650 MILLIGRAM(S): at 01:54

## 2019-01-01 RX ADMIN — Medication 1 DROP(S): at 05:49

## 2019-01-01 RX ADMIN — Medication 1 DROP(S): at 23:58

## 2019-01-01 RX ADMIN — Medication 1 DROP(S): at 01:38

## 2019-01-01 RX ADMIN — ZOLPIDEM TARTRATE 5 MILLIGRAM(S): 10 TABLET ORAL at 00:52

## 2019-01-01 RX ADMIN — SODIUM CHLORIDE 1000 MILLILITER(S): 9 INJECTION, SOLUTION INTRAVENOUS at 09:21

## 2019-01-01 RX ADMIN — LIDOCAINE 1 PATCH: 4 CREAM TOPICAL at 11:36

## 2019-01-01 RX ADMIN — VALACYCLOVIR 1000 MILLIGRAM(S): 500 TABLET, FILM COATED ORAL at 06:24

## 2019-01-01 RX ADMIN — LACTULOSE 30 GRAM(S): 10 SOLUTION ORAL at 15:07

## 2019-01-01 RX ADMIN — Medication 20 MILLIGRAM(S): at 18:28

## 2019-01-01 RX ADMIN — Medication 1 DROP(S): at 07:09

## 2019-01-01 RX ADMIN — OXYCODONE HYDROCHLORIDE 10 MILLIGRAM(S): 5 TABLET ORAL at 00:30

## 2019-01-01 RX ADMIN — Medication 1 DROP(S): at 19:00

## 2019-01-01 RX ADMIN — SODIUM CHLORIDE 75 MILLILITER(S): 9 INJECTION, SOLUTION INTRAVENOUS at 23:05

## 2019-01-01 RX ADMIN — OXYCODONE HYDROCHLORIDE 5 MILLIGRAM(S): 5 TABLET ORAL at 18:19

## 2019-01-01 RX ADMIN — DULOXETINE HYDROCHLORIDE 20 MILLIGRAM(S): 30 CAPSULE, DELAYED RELEASE ORAL at 12:41

## 2019-01-01 RX ADMIN — GABAPENTIN 800 MILLIGRAM(S): 400 CAPSULE ORAL at 14:58

## 2019-01-01 RX ADMIN — LIDOCAINE 1 PATCH: 4 CREAM TOPICAL at 08:10

## 2019-01-01 RX ADMIN — GABAPENTIN 800 MILLIGRAM(S): 400 CAPSULE ORAL at 05:15

## 2019-01-01 RX ADMIN — Medication 20 MILLIGRAM(S): at 18:02

## 2019-01-01 RX ADMIN — Medication 1 DROP(S): at 06:19

## 2019-01-01 RX ADMIN — Medication 20 MILLIGRAM(S): at 17:12

## 2019-01-01 RX ADMIN — MORPHINE SULFATE 2 MILLIGRAM(S): 50 CAPSULE, EXTENDED RELEASE ORAL at 15:37

## 2019-01-01 RX ADMIN — Medication 1 DROP(S): at 22:40

## 2019-01-01 RX ADMIN — ZOLPIDEM TARTRATE 5 MILLIGRAM(S): 10 TABLET ORAL at 00:38

## 2019-01-01 RX ADMIN — MORPHINE SULFATE 2 MILLIGRAM(S): 50 CAPSULE, EXTENDED RELEASE ORAL at 13:43

## 2019-01-01 RX ADMIN — OXYCODONE HYDROCHLORIDE 10 MILLIGRAM(S): 5 TABLET ORAL at 21:46

## 2019-01-01 RX ADMIN — Medication 1 DROP(S): at 16:36

## 2019-01-01 RX ADMIN — Medication 2 TABLET(S): at 05:29

## 2019-01-01 RX ADMIN — LIDOCAINE 1 PATCH: 4 CREAM TOPICAL at 18:07

## 2019-01-01 RX ADMIN — Medication 500 MILLIGRAM(S): at 12:16

## 2019-01-01 RX ADMIN — GABAPENTIN 600 MILLIGRAM(S): 400 CAPSULE ORAL at 05:52

## 2019-01-01 RX ADMIN — Medication 1000 MILLIGRAM(S): at 06:03

## 2019-01-01 RX ADMIN — Medication 1 DROP(S): at 12:17

## 2019-01-01 RX ADMIN — Medication 20 MILLIEQUIVALENT(S): at 12:02

## 2019-01-01 RX ADMIN — Medication 2 DROP(S): at 05:29

## 2019-01-01 RX ADMIN — MORPHINE SULFATE 2 MILLIGRAM(S): 50 CAPSULE, EXTENDED RELEASE ORAL at 01:27

## 2019-01-01 RX ADMIN — Medication 1 DROP(S): at 06:03

## 2019-01-01 RX ADMIN — HYDROMORPHONE HYDROCHLORIDE 0.5 MILLIGRAM(S): 2 INJECTION INTRAMUSCULAR; INTRAVENOUS; SUBCUTANEOUS at 04:35

## 2019-01-01 RX ADMIN — SODIUM CHLORIDE 1000 MILLILITER(S): 9 INJECTION INTRAMUSCULAR; INTRAVENOUS; SUBCUTANEOUS at 18:30

## 2019-01-01 RX ADMIN — PROPOFOL 1.44 MICROGRAM(S)/KG/MIN: 10 INJECTION, EMULSION INTRAVENOUS at 16:04

## 2019-01-01 RX ADMIN — OXYCODONE HYDROCHLORIDE 10 MILLIGRAM(S): 5 TABLET ORAL at 07:10

## 2019-01-01 RX ADMIN — Medication 100 MILLIGRAM(S): at 15:00

## 2019-01-01 RX ADMIN — Medication 20 MILLIGRAM(S): at 17:55

## 2019-01-01 RX ADMIN — FENTANYL CITRATE 25 MICROGRAM(S): 50 INJECTION INTRAVENOUS at 21:26

## 2019-01-01 RX ADMIN — Medication 1 DROP(S): at 21:54

## 2019-01-01 RX ADMIN — OXYCODONE HYDROCHLORIDE 10 MILLIGRAM(S): 5 TABLET ORAL at 05:49

## 2019-01-01 RX ADMIN — GABAPENTIN 800 MILLIGRAM(S): 400 CAPSULE ORAL at 05:50

## 2019-01-01 RX ADMIN — Medication 500 MILLIGRAM(S): at 11:30

## 2019-01-01 RX ADMIN — Medication 100 MILLIEQUIVALENT(S): at 08:50

## 2019-01-01 RX ADMIN — Medication 1 DROP(S): at 17:04

## 2019-01-01 RX ADMIN — GABAPENTIN 600 MILLIGRAM(S): 400 CAPSULE ORAL at 21:55

## 2019-01-01 RX ADMIN — VALACYCLOVIR 1000 MILLIGRAM(S): 500 TABLET, FILM COATED ORAL at 15:12

## 2019-01-01 RX ADMIN — PANTOPRAZOLE SODIUM 10 MG/HR: 20 TABLET, DELAYED RELEASE ORAL at 11:04

## 2019-01-01 RX ADMIN — LIDOCAINE 1 PATCH: 4 CREAM TOPICAL at 12:13

## 2019-01-01 RX ADMIN — PANTOPRAZOLE SODIUM 40 MILLIGRAM(S): 20 TABLET, DELAYED RELEASE ORAL at 20:02

## 2019-01-01 RX ADMIN — GABAPENTIN 800 MILLIGRAM(S): 400 CAPSULE ORAL at 06:23

## 2019-01-01 RX ADMIN — Medication 100 MILLIGRAM(S): at 05:02

## 2019-01-01 RX ADMIN — DULOXETINE HYDROCHLORIDE 30 MILLIGRAM(S): 30 CAPSULE, DELAYED RELEASE ORAL at 05:18

## 2019-01-01 RX ADMIN — GABAPENTIN 800 MILLIGRAM(S): 400 CAPSULE ORAL at 13:04

## 2019-01-01 RX ADMIN — LIDOCAINE 1 PATCH: 4 CREAM TOPICAL at 14:48

## 2019-01-01 RX ADMIN — Medication 650 MILLIGRAM(S): at 16:55

## 2019-01-01 RX ADMIN — GABAPENTIN 800 MILLIGRAM(S): 400 CAPSULE ORAL at 22:44

## 2019-01-01 RX ADMIN — PANTOPRAZOLE SODIUM 40 MILLIGRAM(S): 20 TABLET, DELAYED RELEASE ORAL at 06:30

## 2019-01-01 RX ADMIN — Medication 1000 MILLIGRAM(S): at 21:26

## 2019-01-01 RX ADMIN — Medication 1000 MILLIGRAM(S): at 06:43

## 2019-01-01 RX ADMIN — Medication 1 BOTTLE: at 09:29

## 2019-01-01 RX ADMIN — POLYETHYLENE GLYCOL 3350 17 GRAM(S): 17 POWDER, FOR SOLUTION ORAL at 12:04

## 2019-01-01 RX ADMIN — Medication 200 MILLIGRAM(S): at 17:23

## 2019-01-01 RX ADMIN — GABAPENTIN 800 MILLIGRAM(S): 400 CAPSULE ORAL at 22:26

## 2019-01-01 RX ADMIN — PANTOPRAZOLE SODIUM 40 MILLIGRAM(S): 20 TABLET, DELAYED RELEASE ORAL at 18:29

## 2019-01-01 RX ADMIN — Medication 20 MILLIGRAM(S): at 17:53

## 2019-01-01 RX ADMIN — MAGNESIUM OXIDE 400 MG ORAL TABLET 400 MILLIGRAM(S): 241.3 TABLET ORAL at 07:46

## 2019-01-01 RX ADMIN — ZOLPIDEM TARTRATE 5 MILLIGRAM(S): 10 TABLET ORAL at 23:38

## 2019-01-01 RX ADMIN — FENTANYL CITRATE 2.75 MICROGRAM(S)/KG/HR: 50 INJECTION INTRAVENOUS at 03:39

## 2019-01-01 RX ADMIN — Medication 1 PACKET(S): at 06:44

## 2019-01-01 RX ADMIN — PANTOPRAZOLE SODIUM 40 MILLIGRAM(S): 20 TABLET, DELAYED RELEASE ORAL at 19:05

## 2019-01-01 RX ADMIN — Medication 10 MILLIGRAM(S): at 12:03

## 2019-01-01 RX ADMIN — Medication 20 MILLIGRAM(S): at 18:29

## 2019-01-01 RX ADMIN — Medication 200 MILLIGRAM(S): at 21:48

## 2019-01-01 RX ADMIN — OXYCODONE AND ACETAMINOPHEN 2 TABLET(S): 5; 325 TABLET ORAL at 05:00

## 2019-01-01 RX ADMIN — SODIUM CHLORIDE 1000 MILLILITER(S): 9 INJECTION INTRAMUSCULAR; INTRAVENOUS; SUBCUTANEOUS at 17:30

## 2019-01-01 RX ADMIN — SODIUM CHLORIDE 1000 MILLILITER(S): 9 INJECTION INTRAMUSCULAR; INTRAVENOUS; SUBCUTANEOUS at 21:38

## 2019-01-01 RX ADMIN — Medication 1 DROP(S): at 09:04

## 2019-01-01 RX ADMIN — CEFTRIAXONE 1000 MILLIGRAM(S): 500 INJECTION, POWDER, FOR SOLUTION INTRAMUSCULAR; INTRAVENOUS at 20:30

## 2019-01-01 RX ADMIN — OXYCODONE HYDROCHLORIDE 10 MILLIGRAM(S): 5 TABLET ORAL at 10:54

## 2019-01-01 RX ADMIN — OXYCODONE HYDROCHLORIDE 10 MILLIGRAM(S): 5 TABLET ORAL at 22:27

## 2019-01-01 RX ADMIN — PIPERACILLIN AND TAZOBACTAM 25 GRAM(S): 4; .5 INJECTION, POWDER, LYOPHILIZED, FOR SOLUTION INTRAVENOUS at 13:28

## 2019-01-01 RX ADMIN — MORPHINE SULFATE 4 MILLIGRAM(S): 50 CAPSULE, EXTENDED RELEASE ORAL at 18:40

## 2019-01-01 RX ADMIN — Medication 650 MILLIGRAM(S): at 23:30

## 2019-01-01 RX ADMIN — MEROPENEM 100 MILLIGRAM(S): 1 INJECTION INTRAVENOUS at 18:12

## 2019-01-01 RX ADMIN — Medication 1000 MILLIGRAM(S): at 22:24

## 2019-01-01 RX ADMIN — Medication 650 MILLIGRAM(S): at 18:02

## 2019-01-01 RX ADMIN — Medication 1 DROP(S): at 06:13

## 2019-01-01 RX ADMIN — VALACYCLOVIR 1000 MILLIGRAM(S): 500 TABLET, FILM COATED ORAL at 13:39

## 2019-01-01 RX ADMIN — OXYCODONE HYDROCHLORIDE 10 MILLIGRAM(S): 5 TABLET ORAL at 11:00

## 2019-01-01 RX ADMIN — PANTOPRAZOLE SODIUM 10 MG/HR: 20 TABLET, DELAYED RELEASE ORAL at 21:23

## 2019-01-01 RX ADMIN — GABAPENTIN 600 MILLIGRAM(S): 400 CAPSULE ORAL at 22:39

## 2019-01-01 RX ADMIN — MORPHINE SULFATE 2 MILLIGRAM(S): 50 CAPSULE, EXTENDED RELEASE ORAL at 19:24

## 2019-01-01 RX ADMIN — Medication 650 MILLIGRAM(S): at 19:57

## 2019-01-01 RX ADMIN — Medication 20 MILLIGRAM(S): at 06:22

## 2019-01-01 RX ADMIN — OXYCODONE HYDROCHLORIDE 10 MILLIGRAM(S): 5 TABLET ORAL at 01:55

## 2019-01-01 RX ADMIN — VALACYCLOVIR 1000 MILLIGRAM(S): 500 TABLET, FILM COATED ORAL at 15:42

## 2019-01-01 RX ADMIN — Medication 1 TABLET(S): at 09:20

## 2019-01-01 RX ADMIN — SODIUM CHLORIDE 1000 MILLILITER(S): 9 INJECTION INTRAMUSCULAR; INTRAVENOUS; SUBCUTANEOUS at 19:00

## 2019-01-01 RX ADMIN — VALACYCLOVIR 1000 MILLIGRAM(S): 500 TABLET, FILM COATED ORAL at 06:25

## 2019-01-01 RX ADMIN — PANTOPRAZOLE SODIUM 10 MG/HR: 20 TABLET, DELAYED RELEASE ORAL at 04:15

## 2019-01-01 RX ADMIN — GABAPENTIN 800 MILLIGRAM(S): 400 CAPSULE ORAL at 21:49

## 2019-01-01 RX ADMIN — OXYCODONE HYDROCHLORIDE 10 MILLIGRAM(S): 5 TABLET ORAL at 22:01

## 2019-01-01 RX ADMIN — PANTOPRAZOLE SODIUM 40 MILLIGRAM(S): 20 TABLET, DELAYED RELEASE ORAL at 06:25

## 2019-01-01 RX ADMIN — LIDOCAINE 1 PATCH: 4 CREAM TOPICAL at 04:00

## 2019-01-01 RX ADMIN — GABAPENTIN 600 MILLIGRAM(S): 400 CAPSULE ORAL at 06:09

## 2019-01-01 RX ADMIN — DULOXETINE HYDROCHLORIDE 20 MILLIGRAM(S): 30 CAPSULE, DELAYED RELEASE ORAL at 05:07

## 2019-01-01 RX ADMIN — VALACYCLOVIR 1000 MILLIGRAM(S): 500 TABLET, FILM COATED ORAL at 21:14

## 2019-01-01 RX ADMIN — Medication 133 MILLILITER(S): at 15:07

## 2019-01-01 RX ADMIN — PIPERACILLIN AND TAZOBACTAM 25 GRAM(S): 4; .5 INJECTION, POWDER, LYOPHILIZED, FOR SOLUTION INTRAVENOUS at 15:44

## 2019-01-01 RX ADMIN — MORPHINE SULFATE 2 MILLIGRAM(S): 50 CAPSULE, EXTENDED RELEASE ORAL at 22:53

## 2019-01-01 RX ADMIN — GABAPENTIN 600 MILLIGRAM(S): 400 CAPSULE ORAL at 05:19

## 2019-01-01 RX ADMIN — OXYCODONE HYDROCHLORIDE 10 MILLIGRAM(S): 5 TABLET ORAL at 12:17

## 2019-01-01 RX ADMIN — Medication 12.5 GRAM(S): at 00:31

## 2019-01-01 RX ADMIN — Medication 10 MILLIGRAM(S): at 13:50

## 2019-01-01 RX ADMIN — GABAPENTIN 800 MILLIGRAM(S): 400 CAPSULE ORAL at 12:55

## 2019-01-01 RX ADMIN — VALACYCLOVIR 1000 MILLIGRAM(S): 500 TABLET, FILM COATED ORAL at 13:58

## 2019-01-01 RX ADMIN — Medication 20 MILLIGRAM(S): at 06:52

## 2019-01-01 RX ADMIN — GABAPENTIN 600 MILLIGRAM(S): 400 CAPSULE ORAL at 21:10

## 2019-01-01 RX ADMIN — OXYCODONE HYDROCHLORIDE 10 MILLIGRAM(S): 5 TABLET ORAL at 10:18

## 2019-01-01 RX ADMIN — DULOXETINE HYDROCHLORIDE 20 MILLIGRAM(S): 30 CAPSULE, DELAYED RELEASE ORAL at 06:22

## 2019-01-01 RX ADMIN — OXYCODONE HYDROCHLORIDE 10 MILLIGRAM(S): 5 TABLET ORAL at 01:30

## 2019-01-01 RX ADMIN — PANTOPRAZOLE SODIUM 40 MILLIGRAM(S): 20 TABLET, DELAYED RELEASE ORAL at 06:04

## 2019-01-01 RX ADMIN — GABAPENTIN 800 MILLIGRAM(S): 400 CAPSULE ORAL at 15:42

## 2019-01-01 RX ADMIN — OXYCODONE HYDROCHLORIDE 10 MILLIGRAM(S): 5 TABLET ORAL at 08:34

## 2019-01-01 RX ADMIN — VALACYCLOVIR 1000 MILLIGRAM(S): 500 TABLET, FILM COATED ORAL at 14:11

## 2019-01-01 RX ADMIN — Medication 20 MILLIGRAM(S): at 18:12

## 2019-01-01 RX ADMIN — MORPHINE SULFATE 2 MILLIGRAM(S): 50 CAPSULE, EXTENDED RELEASE ORAL at 06:55

## 2019-01-01 RX ADMIN — MEROPENEM 100 MILLIGRAM(S): 1 INJECTION INTRAVENOUS at 21:34

## 2019-01-01 RX ADMIN — LIDOCAINE 1 PATCH: 4 CREAM TOPICAL at 20:00

## 2019-01-01 RX ADMIN — VALACYCLOVIR 1000 MILLIGRAM(S): 500 TABLET, FILM COATED ORAL at 21:00

## 2019-01-01 RX ADMIN — MEROPENEM 100 MILLIGRAM(S): 1 INJECTION INTRAVENOUS at 22:00

## 2019-01-01 RX ADMIN — LIDOCAINE 1 PATCH: 4 CREAM TOPICAL at 10:00

## 2019-01-01 RX ADMIN — MORPHINE SULFATE 2 MILLIGRAM(S): 50 CAPSULE, EXTENDED RELEASE ORAL at 06:20

## 2019-01-01 RX ADMIN — ATORVASTATIN CALCIUM 40 MILLIGRAM(S): 80 TABLET, FILM COATED ORAL at 21:32

## 2019-01-01 RX ADMIN — VALACYCLOVIR 1000 MILLIGRAM(S): 500 TABLET, FILM COATED ORAL at 22:09

## 2019-01-01 RX ADMIN — SODIUM CHLORIDE 3 MILLILITER(S): 9 INJECTION INTRAMUSCULAR; INTRAVENOUS; SUBCUTANEOUS at 05:45

## 2019-01-01 RX ADMIN — Medication 20 MILLIGRAM(S): at 17:37

## 2019-01-01 RX ADMIN — PIPERACILLIN AND TAZOBACTAM 25 GRAM(S): 4; .5 INJECTION, POWDER, LYOPHILIZED, FOR SOLUTION INTRAVENOUS at 06:52

## 2019-01-01 RX ADMIN — OXYCODONE AND ACETAMINOPHEN 2 TABLET(S): 5; 325 TABLET ORAL at 07:00

## 2019-01-01 RX ADMIN — SODIUM CHLORIDE 3 MILLILITER(S): 9 INJECTION INTRAMUSCULAR; INTRAVENOUS; SUBCUTANEOUS at 21:32

## 2019-01-01 RX ADMIN — GABAPENTIN 600 MILLIGRAM(S): 400 CAPSULE ORAL at 06:46

## 2019-01-01 RX ADMIN — DULOXETINE HYDROCHLORIDE 30 MILLIGRAM(S): 30 CAPSULE, DELAYED RELEASE ORAL at 18:20

## 2019-01-01 RX ADMIN — LIDOCAINE 1 PATCH: 4 CREAM TOPICAL at 23:47

## 2019-01-01 RX ADMIN — PANTOPRAZOLE SODIUM 40 MILLIGRAM(S): 20 TABLET, DELAYED RELEASE ORAL at 05:36

## 2019-01-01 RX ADMIN — Medication 1 APPLICATION(S): at 11:42

## 2019-01-01 RX ADMIN — OXYCODONE HYDROCHLORIDE 10 MILLIGRAM(S): 5 TABLET ORAL at 16:34

## 2019-01-01 RX ADMIN — SODIUM CHLORIDE 50 MILLILITER(S): 9 INJECTION, SOLUTION INTRAVENOUS at 06:22

## 2019-01-01 RX ADMIN — PANTOPRAZOLE SODIUM 40 MILLIGRAM(S): 20 TABLET, DELAYED RELEASE ORAL at 22:01

## 2019-01-01 RX ADMIN — ONDANSETRON 4 MILLIGRAM(S): 8 TABLET, FILM COATED ORAL at 21:04

## 2019-01-01 RX ADMIN — GABAPENTIN 800 MILLIGRAM(S): 400 CAPSULE ORAL at 05:34

## 2019-01-01 RX ADMIN — Medication 1 DROP(S): at 04:45

## 2019-01-01 RX ADMIN — Medication 400 MILLIGRAM(S): at 18:31

## 2019-01-01 RX ADMIN — PANTOPRAZOLE SODIUM 40 MILLIGRAM(S): 20 TABLET, DELAYED RELEASE ORAL at 06:41

## 2019-01-01 RX ADMIN — DULOXETINE HYDROCHLORIDE 30 MILLIGRAM(S): 30 CAPSULE, DELAYED RELEASE ORAL at 17:44

## 2019-01-01 RX ADMIN — VALACYCLOVIR 1000 MILLIGRAM(S): 500 TABLET, FILM COATED ORAL at 13:09

## 2019-01-01 RX ADMIN — AMLODIPINE BESYLATE 5 MILLIGRAM(S): 2.5 TABLET ORAL at 06:21

## 2019-01-01 RX ADMIN — MORPHINE SULFATE 2 MILLIGRAM(S): 50 CAPSULE, EXTENDED RELEASE ORAL at 06:46

## 2019-01-01 RX ADMIN — CEFTRIAXONE 100 MILLIGRAM(S): 500 INJECTION, POWDER, FOR SOLUTION INTRAMUSCULAR; INTRAVENOUS at 14:04

## 2019-01-01 RX ADMIN — AMLODIPINE BESYLATE 5 MILLIGRAM(S): 2.5 TABLET ORAL at 05:38

## 2019-01-01 RX ADMIN — MORPHINE SULFATE 2 MILLIGRAM(S): 50 CAPSULE, EXTENDED RELEASE ORAL at 15:17

## 2019-01-01 RX ADMIN — Medication 1000 MILLIGRAM(S): at 16:28

## 2019-01-01 RX ADMIN — MEROPENEM 100 MILLIGRAM(S): 1 INJECTION INTRAVENOUS at 13:30

## 2019-01-01 RX ADMIN — MORPHINE SULFATE 2 MILLIGRAM(S): 50 CAPSULE, EXTENDED RELEASE ORAL at 19:10

## 2019-01-01 RX ADMIN — DULOXETINE HYDROCHLORIDE 20 MILLIGRAM(S): 30 CAPSULE, DELAYED RELEASE ORAL at 05:17

## 2019-01-01 RX ADMIN — Medication 650 MILLIGRAM(S): at 22:58

## 2019-01-01 RX ADMIN — LIDOCAINE 1 PATCH: 4 CREAM TOPICAL at 12:19

## 2019-01-01 RX ADMIN — PROPOFOL 1.65 MICROGRAM(S)/KG/MIN: 10 INJECTION, EMULSION INTRAVENOUS at 01:20

## 2019-01-01 RX ADMIN — LIDOCAINE 1 PATCH: 4 CREAM TOPICAL at 17:56

## 2019-01-01 RX ADMIN — CHLORHEXIDINE GLUCONATE 15 MILLILITER(S): 213 SOLUTION TOPICAL at 05:06

## 2019-01-01 RX ADMIN — ATORVASTATIN CALCIUM 40 MILLIGRAM(S): 80 TABLET, FILM COATED ORAL at 22:16

## 2019-01-01 RX ADMIN — DULOXETINE HYDROCHLORIDE 30 MILLIGRAM(S): 30 CAPSULE, DELAYED RELEASE ORAL at 18:12

## 2019-01-01 RX ADMIN — PIPERACILLIN AND TAZOBACTAM 3.38 GRAM(S): 4; .5 INJECTION, POWDER, LYOPHILIZED, FOR SOLUTION INTRAVENOUS at 00:30

## 2019-01-01 RX ADMIN — CHLORHEXIDINE GLUCONATE 1 APPLICATION(S): 213 SOLUTION TOPICAL at 07:00

## 2019-01-01 RX ADMIN — Medication 1 DROP(S): at 11:21

## 2019-01-01 RX ADMIN — OXYCODONE HYDROCHLORIDE 10 MILLIGRAM(S): 5 TABLET ORAL at 23:30

## 2019-01-01 RX ADMIN — MORPHINE SULFATE 2 MILLIGRAM(S): 50 CAPSULE, EXTENDED RELEASE ORAL at 22:44

## 2019-01-01 RX ADMIN — Medication 1 APPLICATION(S): at 17:46

## 2019-01-01 RX ADMIN — Medication 500 MILLIGRAM(S): at 11:55

## 2019-01-01 RX ADMIN — ATORVASTATIN CALCIUM 40 MILLIGRAM(S): 80 TABLET, FILM COATED ORAL at 23:38

## 2019-01-01 RX ADMIN — DULOXETINE HYDROCHLORIDE 30 MILLIGRAM(S): 30 CAPSULE, DELAYED RELEASE ORAL at 06:24

## 2019-01-01 RX ADMIN — SODIUM CHLORIDE 3 MILLILITER(S): 9 INJECTION INTRAMUSCULAR; INTRAVENOUS; SUBCUTANEOUS at 14:00

## 2019-01-01 RX ADMIN — PROPOFOL 1.44 MICROGRAM(S)/KG/MIN: 10 INJECTION, EMULSION INTRAVENOUS at 00:34

## 2019-01-01 RX ADMIN — LIDOCAINE 1 PATCH: 4 CREAM TOPICAL at 13:09

## 2019-01-01 RX ADMIN — Medication 1 DROP(S): at 18:07

## 2019-01-01 RX ADMIN — GABAPENTIN 600 MILLIGRAM(S): 400 CAPSULE ORAL at 23:33

## 2019-01-01 RX ADMIN — Medication 1 DROP(S): at 23:42

## 2019-01-01 RX ADMIN — OXYCODONE HYDROCHLORIDE 10 MILLIGRAM(S): 5 TABLET ORAL at 17:22

## 2019-01-01 RX ADMIN — PANTOPRAZOLE SODIUM 40 MILLIGRAM(S): 20 TABLET, DELAYED RELEASE ORAL at 19:02

## 2019-01-01 RX ADMIN — PANTOPRAZOLE SODIUM 80 MILLIGRAM(S): 20 TABLET, DELAYED RELEASE ORAL at 23:15

## 2019-01-01 RX ADMIN — DULOXETINE HYDROCHLORIDE 30 MILLIGRAM(S): 30 CAPSULE, DELAYED RELEASE ORAL at 05:35

## 2019-01-01 RX ADMIN — VALACYCLOVIR 1000 MILLIGRAM(S): 500 TABLET, FILM COATED ORAL at 14:27

## 2019-01-01 RX ADMIN — GABAPENTIN 800 MILLIGRAM(S): 400 CAPSULE ORAL at 15:12

## 2019-01-01 RX ADMIN — Medication 1 DROP(S): at 18:00

## 2019-01-01 RX ADMIN — MEROPENEM 100 MILLIGRAM(S): 1 INJECTION INTRAVENOUS at 06:01

## 2019-01-01 RX ADMIN — CHLORHEXIDINE GLUCONATE 1 APPLICATION(S): 213 SOLUTION TOPICAL at 06:51

## 2019-01-01 RX ADMIN — SODIUM CHLORIDE 3 MILLILITER(S): 9 INJECTION INTRAMUSCULAR; INTRAVENOUS; SUBCUTANEOUS at 14:17

## 2019-01-01 RX ADMIN — ZOLPIDEM TARTRATE 5 MILLIGRAM(S): 10 TABLET ORAL at 23:51

## 2019-01-01 RX ADMIN — ZOLPIDEM TARTRATE 5 MILLIGRAM(S): 10 TABLET ORAL at 23:03

## 2019-01-01 RX ADMIN — Medication 1 DROP(S): at 14:20

## 2019-01-01 RX ADMIN — GABAPENTIN 800 MILLIGRAM(S): 400 CAPSULE ORAL at 21:35

## 2019-01-01 RX ADMIN — OXYCODONE HYDROCHLORIDE 10 MILLIGRAM(S): 5 TABLET ORAL at 06:47

## 2019-01-01 RX ADMIN — PANTOPRAZOLE SODIUM 10 MG/HR: 20 TABLET, DELAYED RELEASE ORAL at 11:51

## 2019-01-01 RX ADMIN — Medication 1 DROP(S): at 17:09

## 2019-01-01 RX ADMIN — MORPHINE SULFATE 2 MILLIGRAM(S): 50 CAPSULE, EXTENDED RELEASE ORAL at 17:15

## 2019-01-01 RX ADMIN — Medication 1 DROP(S): at 12:14

## 2019-01-01 RX ADMIN — Medication 1 DROP(S): at 23:14

## 2019-01-01 RX ADMIN — ATORVASTATIN CALCIUM 40 MILLIGRAM(S): 80 TABLET, FILM COATED ORAL at 21:56

## 2019-01-01 RX ADMIN — Medication 20 MILLIGRAM(S): at 17:19

## 2019-01-01 RX ADMIN — PANTOPRAZOLE SODIUM 40 MILLIGRAM(S): 20 TABLET, DELAYED RELEASE ORAL at 18:21

## 2019-01-01 RX ADMIN — CEFTRIAXONE 1000 MILLIGRAM(S): 500 INJECTION, POWDER, FOR SOLUTION INTRAMUSCULAR; INTRAVENOUS at 02:40

## 2019-01-01 RX ADMIN — VALACYCLOVIR 1000 MILLIGRAM(S): 500 TABLET, FILM COATED ORAL at 22:11

## 2019-01-01 RX ADMIN — DULOXETINE HYDROCHLORIDE 30 MILLIGRAM(S): 30 CAPSULE, DELAYED RELEASE ORAL at 17:55

## 2019-01-01 RX ADMIN — DULOXETINE HYDROCHLORIDE 20 MILLIGRAM(S): 30 CAPSULE, DELAYED RELEASE ORAL at 17:51

## 2019-01-01 RX ADMIN — DULOXETINE HYDROCHLORIDE 30 MILLIGRAM(S): 30 CAPSULE, DELAYED RELEASE ORAL at 05:23

## 2019-01-01 RX ADMIN — MORPHINE SULFATE 2 MILLIGRAM(S): 50 CAPSULE, EXTENDED RELEASE ORAL at 22:39

## 2019-01-01 RX ADMIN — VALACYCLOVIR 1000 MILLIGRAM(S): 500 TABLET, FILM COATED ORAL at 06:13

## 2019-01-01 RX ADMIN — DULOXETINE HYDROCHLORIDE 30 MILLIGRAM(S): 30 CAPSULE, DELAYED RELEASE ORAL at 06:40

## 2019-01-01 RX ADMIN — GABAPENTIN 600 MILLIGRAM(S): 400 CAPSULE ORAL at 16:28

## 2019-01-01 RX ADMIN — FENTANYL CITRATE 25 MICROGRAM(S): 50 INJECTION INTRAVENOUS at 00:53

## 2019-01-01 RX ADMIN — LIDOCAINE 1 PATCH: 4 CREAM TOPICAL at 00:00

## 2019-01-01 RX ADMIN — Medication 1000 MILLIGRAM(S): at 13:50

## 2019-01-01 RX ADMIN — AMIODARONE HYDROCHLORIDE 600 MILLIGRAM(S): 400 TABLET ORAL at 06:20

## 2019-01-01 RX ADMIN — SODIUM CHLORIDE 3 MILLILITER(S): 9 INJECTION INTRAMUSCULAR; INTRAVENOUS; SUBCUTANEOUS at 06:50

## 2019-01-01 RX ADMIN — Medication 1 TABLET(S): at 12:13

## 2019-01-01 RX ADMIN — Medication 500 MILLIGRAM(S): at 12:52

## 2019-01-01 RX ADMIN — MIDAZOLAM HYDROCHLORIDE 2 MILLIGRAM(S): 1 INJECTION, SOLUTION INTRAMUSCULAR; INTRAVENOUS at 15:50

## 2019-01-01 RX ADMIN — DULOXETINE HYDROCHLORIDE 20 MILLIGRAM(S): 30 CAPSULE, DELAYED RELEASE ORAL at 18:23

## 2019-01-01 RX ADMIN — Medication 1 DROP(S): at 16:06

## 2019-01-01 RX ADMIN — Medication 1 DROP(S): at 11:11

## 2019-01-01 RX ADMIN — Medication 1000 MILLIGRAM(S): at 21:34

## 2019-01-01 RX ADMIN — OXYCODONE AND ACETAMINOPHEN 2 TABLET(S): 5; 325 TABLET ORAL at 19:08

## 2019-01-01 RX ADMIN — LIDOCAINE 1 PATCH: 4 CREAM TOPICAL at 19:15

## 2019-01-01 RX ADMIN — SODIUM CHLORIDE 1000 MILLILITER(S): 9 INJECTION INTRAMUSCULAR; INTRAVENOUS; SUBCUTANEOUS at 01:35

## 2019-01-01 RX ADMIN — PIPERACILLIN AND TAZOBACTAM 25 GRAM(S): 4; .5 INJECTION, POWDER, LYOPHILIZED, FOR SOLUTION INTRAVENOUS at 22:14

## 2019-01-01 RX ADMIN — SODIUM CHLORIDE 3 MILLILITER(S): 9 INJECTION INTRAMUSCULAR; INTRAVENOUS; SUBCUTANEOUS at 05:28

## 2019-01-01 RX ADMIN — LIDOCAINE 1 PATCH: 4 CREAM TOPICAL at 12:04

## 2019-01-01 RX ADMIN — MORPHINE SULFATE 4 MILLIGRAM(S): 50 CAPSULE, EXTENDED RELEASE ORAL at 22:24

## 2019-01-01 RX ADMIN — Medication 1 DROP(S): at 13:24

## 2019-01-01 RX ADMIN — Medication 1 DROP(S): at 21:42

## 2019-01-01 RX ADMIN — OXYCODONE HYDROCHLORIDE 10 MILLIGRAM(S): 5 TABLET ORAL at 04:40

## 2019-01-01 RX ADMIN — OXYCODONE AND ACETAMINOPHEN 2 TABLET(S): 5; 325 TABLET ORAL at 20:16

## 2019-01-01 RX ADMIN — HALOPERIDOL DECANOATE 1 MILLIGRAM(S): 100 INJECTION INTRAMUSCULAR at 14:09

## 2019-01-01 RX ADMIN — MEROPENEM 100 MILLIGRAM(S): 1 INJECTION INTRAVENOUS at 18:48

## 2019-01-01 RX ADMIN — Medication 400 MILLIGRAM(S): at 06:40

## 2019-01-01 RX ADMIN — PROPOFOL 1.52 MICROGRAM(S)/KG/MIN: 10 INJECTION, EMULSION INTRAVENOUS at 00:58

## 2019-01-01 RX ADMIN — Medication 1 DROP(S): at 00:12

## 2019-01-01 RX ADMIN — LIDOCAINE 1 PATCH: 4 CREAM TOPICAL at 22:29

## 2019-01-01 RX ADMIN — Medication 200 MILLIGRAM(S): at 05:21

## 2019-01-01 RX ADMIN — OXYCODONE HYDROCHLORIDE 5 MILLIGRAM(S): 5 TABLET ORAL at 17:24

## 2019-01-01 RX ADMIN — Medication 20 MILLIGRAM(S): at 05:15

## 2019-01-01 RX ADMIN — LIDOCAINE 1 PATCH: 4 CREAM TOPICAL at 20:17

## 2019-01-01 RX ADMIN — MEROPENEM 100 MILLIGRAM(S): 1 INJECTION INTRAVENOUS at 15:25

## 2019-01-01 RX ADMIN — GABAPENTIN 600 MILLIGRAM(S): 400 CAPSULE ORAL at 17:46

## 2019-01-01 RX ADMIN — DULOXETINE HYDROCHLORIDE 30 MILLIGRAM(S): 30 CAPSULE, DELAYED RELEASE ORAL at 06:02

## 2019-01-01 RX ADMIN — DULOXETINE HYDROCHLORIDE 20 MILLIGRAM(S): 30 CAPSULE, DELAYED RELEASE ORAL at 17:20

## 2019-01-01 RX ADMIN — CHLORHEXIDINE GLUCONATE 15 MILLILITER(S): 213 SOLUTION TOPICAL at 06:34

## 2019-01-01 RX ADMIN — GABAPENTIN 300 MILLIGRAM(S): 400 CAPSULE ORAL at 05:12

## 2019-01-01 RX ADMIN — Medication 1 DROP(S): at 18:06

## 2019-01-01 RX ADMIN — SODIUM CHLORIDE 3 MILLILITER(S): 9 INJECTION INTRAMUSCULAR; INTRAVENOUS; SUBCUTANEOUS at 06:31

## 2019-01-01 RX ADMIN — SODIUM CHLORIDE 75 MILLILITER(S): 9 INJECTION, SOLUTION INTRAVENOUS at 17:52

## 2019-01-01 RX ADMIN — DEXTROSE MONOHYDRATE, SODIUM CHLORIDE, AND POTASSIUM CHLORIDE 75 MILLILITER(S): 50; .745; 4.5 INJECTION, SOLUTION INTRAVENOUS at 05:43

## 2019-01-01 RX ADMIN — OXYCODONE AND ACETAMINOPHEN 1 TABLET(S): 5; 325 TABLET ORAL at 05:04

## 2019-01-01 RX ADMIN — NALOXONE HYDROCHLORIDE 0.4 MILLIGRAM(S): 4 SPRAY NASAL at 00:05

## 2019-01-01 RX ADMIN — Medication 1 MILLIGRAM(S): at 14:20

## 2019-01-01 RX ADMIN — POLYETHYLENE GLYCOL 3350 17 GRAM(S): 17 POWDER, FOR SOLUTION ORAL at 17:54

## 2019-01-01 RX ADMIN — GABAPENTIN 600 MILLIGRAM(S): 400 CAPSULE ORAL at 21:17

## 2019-01-01 RX ADMIN — PANTOPRAZOLE SODIUM 40 MILLIGRAM(S): 20 TABLET, DELAYED RELEASE ORAL at 06:09

## 2019-01-01 RX ADMIN — GABAPENTIN 800 MILLIGRAM(S): 400 CAPSULE ORAL at 06:56

## 2019-01-01 RX ADMIN — MORPHINE SULFATE 4 MILLIGRAM(S): 50 CAPSULE, EXTENDED RELEASE ORAL at 10:55

## 2019-01-01 RX ADMIN — LIDOCAINE 1 PATCH: 4 CREAM TOPICAL at 17:22

## 2019-01-01 RX ADMIN — Medication 1000 MILLIGRAM(S): at 07:40

## 2019-01-01 RX ADMIN — Medication 400 MILLIGRAM(S): at 14:43

## 2019-01-01 RX ADMIN — OXYCODONE HYDROCHLORIDE 10 MILLIGRAM(S): 5 TABLET ORAL at 17:06

## 2019-01-01 RX ADMIN — SODIUM CHLORIDE 3 MILLILITER(S): 9 INJECTION INTRAMUSCULAR; INTRAVENOUS; SUBCUTANEOUS at 21:18

## 2019-01-01 RX ADMIN — Medication 200 MILLIGRAM(S): at 14:57

## 2019-01-01 RX ADMIN — Medication 100 MILLIEQUIVALENT(S): at 05:48

## 2019-01-01 RX ADMIN — OXYCODONE HYDROCHLORIDE 10 MILLIGRAM(S): 5 TABLET ORAL at 08:10

## 2019-01-01 RX ADMIN — GABAPENTIN 800 MILLIGRAM(S): 400 CAPSULE ORAL at 13:58

## 2019-01-01 RX ADMIN — Medication 20 MILLIGRAM(S): at 18:14

## 2019-01-01 RX ADMIN — CHLORHEXIDINE GLUCONATE 15 MILLILITER(S): 213 SOLUTION TOPICAL at 05:09

## 2019-01-01 RX ADMIN — SODIUM CHLORIDE 3 MILLILITER(S): 9 INJECTION INTRAMUSCULAR; INTRAVENOUS; SUBCUTANEOUS at 13:32

## 2019-01-01 RX ADMIN — Medication 20 MILLIGRAM(S): at 14:15

## 2019-01-01 RX ADMIN — MORPHINE SULFATE 2 MILLIGRAM(S): 50 CAPSULE, EXTENDED RELEASE ORAL at 21:20

## 2019-01-01 RX ADMIN — Medication 10 MILLIGRAM(S): at 15:32

## 2019-01-01 RX ADMIN — GABAPENTIN 600 MILLIGRAM(S): 400 CAPSULE ORAL at 16:02

## 2019-01-01 RX ADMIN — Medication 105 MILLIGRAM(S): at 05:09

## 2019-01-01 RX ADMIN — Medication 500 MILLIGRAM(S): at 11:38

## 2019-01-01 RX ADMIN — Medication 1 DROP(S): at 07:22

## 2019-01-01 RX ADMIN — Medication 1 DROP(S): at 13:39

## 2019-01-01 RX ADMIN — Medication 2 DROP(S): at 17:04

## 2019-01-01 RX ADMIN — GABAPENTIN 600 MILLIGRAM(S): 400 CAPSULE ORAL at 07:05

## 2019-01-01 RX ADMIN — Medication 1 DROP(S): at 09:06

## 2019-01-01 RX ADMIN — MORPHINE SULFATE 2 MILLIGRAM(S): 50 CAPSULE, EXTENDED RELEASE ORAL at 10:30

## 2019-01-01 RX ADMIN — LIDOCAINE 1 PATCH: 4 CREAM TOPICAL at 13:04

## 2019-01-01 RX ADMIN — GABAPENTIN 800 MILLIGRAM(S): 400 CAPSULE ORAL at 06:02

## 2019-01-01 RX ADMIN — SODIUM CHLORIDE 75 MILLILITER(S): 9 INJECTION, SOLUTION INTRAVENOUS at 13:06

## 2019-01-01 RX ADMIN — Medication 1 DROP(S): at 09:50

## 2019-01-01 RX ADMIN — AMLODIPINE BESYLATE 5 MILLIGRAM(S): 2.5 TABLET ORAL at 05:08

## 2019-01-01 RX ADMIN — LIDOCAINE 1 PATCH: 4 CREAM TOPICAL at 19:10

## 2019-01-01 RX ADMIN — LIDOCAINE 1 PATCH: 4 CREAM TOPICAL at 07:35

## 2019-01-01 RX ADMIN — Medication 1 DROP(S): at 17:58

## 2019-01-01 RX ADMIN — OXYCODONE HYDROCHLORIDE 10 MILLIGRAM(S): 5 TABLET ORAL at 21:19

## 2019-01-01 RX ADMIN — Medication 20 MILLIGRAM(S): at 16:35

## 2019-01-01 RX ADMIN — Medication 1 APPLICATION(S): at 23:49

## 2019-01-01 RX ADMIN — Medication 40 MILLIEQUIVALENT(S): at 12:49

## 2019-01-01 RX ADMIN — HYDROMORPHONE HYDROCHLORIDE 0.5 MILLIGRAM(S): 2 INJECTION INTRAMUSCULAR; INTRAVENOUS; SUBCUTANEOUS at 04:45

## 2019-01-01 RX ADMIN — Medication 1 DROP(S): at 22:52

## 2019-01-01 RX ADMIN — CEFTRIAXONE 1000 MILLIGRAM(S): 500 INJECTION, POWDER, FOR SOLUTION INTRAMUSCULAR; INTRAVENOUS at 17:48

## 2019-01-01 RX ADMIN — Medication 500 MILLIGRAM(S): at 23:21

## 2019-01-01 RX ADMIN — CHLORHEXIDINE GLUCONATE 1 APPLICATION(S): 213 SOLUTION TOPICAL at 05:05

## 2019-01-01 RX ADMIN — MEROPENEM 100 MILLIGRAM(S): 1 INJECTION INTRAVENOUS at 11:37

## 2019-01-01 RX ADMIN — Medication 20 MILLIGRAM(S): at 18:27

## 2019-01-01 RX ADMIN — AMLODIPINE BESYLATE 5 MILLIGRAM(S): 2.5 TABLET ORAL at 05:27

## 2019-01-01 RX ADMIN — SODIUM CHLORIDE 3 MILLILITER(S): 9 INJECTION INTRAMUSCULAR; INTRAVENOUS; SUBCUTANEOUS at 14:28

## 2019-01-01 RX ADMIN — LIDOCAINE 1 PATCH: 4 CREAM TOPICAL at 01:00

## 2019-01-01 RX ADMIN — Medication 105 MILLIGRAM(S): at 06:34

## 2019-01-01 RX ADMIN — PIPERACILLIN AND TAZOBACTAM 25 GRAM(S): 4; .5 INJECTION, POWDER, LYOPHILIZED, FOR SOLUTION INTRAVENOUS at 22:01

## 2019-01-01 RX ADMIN — Medication 650 MILLIGRAM(S): at 02:48

## 2019-01-01 RX ADMIN — MORPHINE SULFATE 2 MILLIGRAM(S): 50 CAPSULE, EXTENDED RELEASE ORAL at 09:28

## 2019-01-01 RX ADMIN — Medication 100 MILLIGRAM(S): at 22:14

## 2019-01-01 RX ADMIN — Medication 200 MILLIGRAM(S): at 06:24

## 2019-01-01 RX ADMIN — PANTOPRAZOLE SODIUM 40 MILLIGRAM(S): 20 TABLET, DELAYED RELEASE ORAL at 05:05

## 2019-01-01 RX ADMIN — ZOLPIDEM TARTRATE 5 MILLIGRAM(S): 10 TABLET ORAL at 01:13

## 2019-01-01 RX ADMIN — Medication 1000 MILLIGRAM(S): at 13:35

## 2019-01-01 RX ADMIN — Medication 20 MILLIGRAM(S): at 05:42

## 2019-01-01 RX ADMIN — GABAPENTIN 800 MILLIGRAM(S): 400 CAPSULE ORAL at 05:18

## 2019-01-01 RX ADMIN — Medication 1 TABLET(S): at 11:30

## 2019-01-01 RX ADMIN — MORPHINE SULFATE 2 MILLIGRAM(S): 50 CAPSULE, EXTENDED RELEASE ORAL at 10:50

## 2019-01-01 RX ADMIN — MORPHINE SULFATE 2 MILLIGRAM(S): 50 CAPSULE, EXTENDED RELEASE ORAL at 16:51

## 2019-01-01 RX ADMIN — PANTOPRAZOLE SODIUM 40 MILLIGRAM(S): 20 TABLET, DELAYED RELEASE ORAL at 05:04

## 2019-01-01 RX ADMIN — PANTOPRAZOLE SODIUM 40 MILLIGRAM(S): 20 TABLET, DELAYED RELEASE ORAL at 05:15

## 2019-01-01 RX ADMIN — FENTANYL CITRATE 25 MICROGRAM(S): 50 INJECTION INTRAVENOUS at 16:04

## 2019-01-01 RX ADMIN — PANTOPRAZOLE SODIUM 40 MILLIGRAM(S): 20 TABLET, DELAYED RELEASE ORAL at 12:12

## 2019-01-01 RX ADMIN — OXYCODONE HYDROCHLORIDE 10 MILLIGRAM(S): 5 TABLET ORAL at 21:17

## 2019-01-01 RX ADMIN — HALOPERIDOL DECANOATE 1 MILLIGRAM(S): 100 INJECTION INTRAMUSCULAR at 11:36

## 2019-01-01 RX ADMIN — OXYCODONE HYDROCHLORIDE 10 MILLIGRAM(S): 5 TABLET ORAL at 06:29

## 2019-01-01 RX ADMIN — Medication 650 MILLIGRAM(S): at 20:00

## 2019-01-01 RX ADMIN — MORPHINE SULFATE 2 MILLIGRAM(S): 50 CAPSULE, EXTENDED RELEASE ORAL at 11:45

## 2019-01-01 RX ADMIN — VALACYCLOVIR 1000 MILLIGRAM(S): 500 TABLET, FILM COATED ORAL at 05:17

## 2019-01-01 RX ADMIN — OXYCODONE HYDROCHLORIDE 10 MILLIGRAM(S): 5 TABLET ORAL at 04:16

## 2019-01-01 RX ADMIN — OXYCODONE HYDROCHLORIDE 10 MILLIGRAM(S): 5 TABLET ORAL at 00:00

## 2019-01-01 RX ADMIN — Medication 500 MILLIGRAM(S): at 11:11

## 2019-01-01 RX ADMIN — Medication 1 DROP(S): at 14:11

## 2019-01-01 RX ADMIN — GABAPENTIN 800 MILLIGRAM(S): 400 CAPSULE ORAL at 22:14

## 2019-01-01 RX ADMIN — LIDOCAINE 1 PATCH: 4 CREAM TOPICAL at 23:54

## 2019-01-01 RX ADMIN — Medication 2.58 MICROGRAM(S)/KG/MIN: at 20:39

## 2019-01-01 RX ADMIN — GABAPENTIN 300 MILLIGRAM(S): 400 CAPSULE ORAL at 17:36

## 2019-01-01 RX ADMIN — GABAPENTIN 600 MILLIGRAM(S): 400 CAPSULE ORAL at 21:20

## 2019-01-01 RX ADMIN — OXYCODONE HYDROCHLORIDE 10 MILLIGRAM(S): 5 TABLET ORAL at 06:12

## 2019-01-01 RX ADMIN — FENTANYL CITRATE 25 MICROGRAM(S): 50 INJECTION INTRAVENOUS at 09:00

## 2019-01-01 RX ADMIN — Medication 2 MILLIGRAM(S): at 02:03

## 2019-01-01 RX ADMIN — Medication 100 MILLIGRAM(S): at 06:49

## 2019-01-01 RX ADMIN — OXYCODONE HYDROCHLORIDE 10 MILLIGRAM(S): 5 TABLET ORAL at 21:22

## 2019-01-01 RX ADMIN — VALACYCLOVIR 1000 MILLIGRAM(S): 500 TABLET, FILM COATED ORAL at 21:18

## 2019-01-01 RX ADMIN — Medication 500 MILLIGRAM(S): at 06:46

## 2019-01-01 RX ADMIN — Medication 200 MILLIGRAM(S): at 05:49

## 2019-01-01 NOTE — H&P ADULT - REASON FOR ADMISSION
Pharmacy Kinetics 4 wk.o. male on gentamicin day # 1  2019    Dosing Weight: 3.165  Currently on Gentamicin 12.7 mg iv q24hr (4 mg/kg/dose)     Indication for treatment: possible  meningitis    Pertinent history per medical record: Admitted on 2019 for possible  meningitis.    Other antibiotics: Ampicillin 158 mg IV q 6 hours, Acyclovir 63.3 mg IV q 8 hours    Allergies: Patient has no known allergies.     List concerns for renal function: noenate    Pertinent cultures to date:   Results for BETO GUERIN (MRN [id number]) as of 2019 11:03   2019 22:50   Number Of Tubes 4   Volume 4.0   Color-Body Fluid Slightly Xantho   Character-Body Fluid Hazy   Supernatant Appearance Slight Xantho   Total WBC Count 300 (H)   Total RBC Count 137   Crenated RBC 0   Polys 11   Lymphs 16   Mononuclear Cells - CSF 73   CSF Tube Number 4   Glucose CSF 32 (L)   Total Protein,  (H)   Enterovirus Source CSF     Results for BETO GUERIN (MRN [id number]) as of 2019 11:03   2019 19:05 2019 19:10 2019 22:50   Significant Indicator NEG NEG .   Site Right AC Left AC TAP   Source BLD BLD CSF   Source   CSF     Recent Labs      19   1905  19   0405   WBC  13.1  9.9   NEUTSPOLYS   --   48.00*     Recent Labs      19   1905  19   0405   BUN  12  8   CREATININE  0.4  0.29*   ALBUMIN  3.5  3.5     No results for input(s): GENTTROUGH, GENTPEAK, GENTRANDOM in the last 72 hours.  Intake/Output Summary (Last 24 hours) at 19 1105  Last data filed at 19 0800   Gross per 24 hour   Intake           169.25 ml   Output              219 ml   Net           -49.75 ml      Blood pressure 71/35, pulse 158, temperature 36.9 °C (98.4 °F), temperature source Rectal, resp. rate 48, weight 3.165 kg (6 lb 15.6 oz), SpO2 98 %. Temp (24hrs), Av.9 °C (98.4 °F), Min:36.3 °C (97.4 °F), Max:37.5 °C (99.5 °F)      A/P   1. Gentamicin dose change: new start per protocol  2. Next  gentamicin level: 0030 2019  3. Goal trough: 0.5-1 mcg/mL  4. Comments: Urine output first 12 hours = 4 mL/kg/hr ok.  Renal labs ok.  Initial spinal tap results noted.  Follow up on cultures.  Follow up on trough.    AMBERLY Rodriguez, PharmD, BCPS   lethargy, poor po intake, fever

## 2019-01-10 NOTE — ASU DISCHARGE PLAN (ADULT/PEDIATRIC). - ITEMS TO FOLLOWUP WITH YOUR PHYSICIAN'S
Call Neurosurgery Dr. Vasquez  for appointment in 1 week for wound check.  Followup with your Private MD in 1-2 weeks.  Return to Emergency Department or contact your Neurosurgeon if any changes in mental status, weakness, numbness or tingling of extremities; difficulty swallowing; drainage or redness of wound, fever; pain in legs; difficulty urinating or constipation.  Donot restart your Aspirin or take any Motrin/NSAIDS until checking with your Neurosurgeon. Call Neurosurgery Dr. Vasquez  for appointment in 1 week for wound check. No creams or ointments to incisions.   Followup with your Private MD in 1-2 weeks.  Return to Emergency Department or contact your Neurosurgeon if any changes in mental status, weakness, numbness or tingling of extremities; difficulty swallowing; drainage or redness of wound, fever; pain in legs; difficulty urinating or constipation.  Donot restart your Aspirin or take any Motrin/NSAIDS until checking with your Neurosurgeon.

## 2019-01-10 NOTE — ASU DISCHARGE PLAN (ADULT/PEDIATRIC). - NOTIFY
Fever greater than 101/Swelling that continues/Bleeding that does not stop/Pain not relieved by Medications/Numbness, color, or temperature change to extremity/Inability to Tolerate Liquids or Foods/Persistent Nausea and Vomiting/Increased Irritability or Sluggishness

## 2019-01-10 NOTE — ASU DISCHARGE PLAN (ADULT/PEDIATRIC). - MEDICATION SUMMARY - MEDICATIONS TO TAKE
I will START or STAY ON the medications listed below when I get home from the hospital:    oxyCODONE 10 mg oral tablet  --  5 mg 1 tab(s) by mouth every 4 hours, As Needed  -- Indication: For Dorsalgia    levorphanol 2 mg oral tablet  -- 1 tab(s) by mouth 3 times a day (after meals)  -- Indication: For Dorsalgia    gabapentin 300 mg oral capsule  -- 1 cap(s) by mouth 3 times a day  -- Indication: For Dorsalgia    Cymbalta 20 mg oral delayed release capsule  -- 1 cap(s) by mouth 2 times a day  -- Indication: For Depression    Ambien 10 mg oral tablet  -- 1 tab(s) by mouth once a day (at bedtime)  -- Indication: For Insomnia    labetalol 200 mg oral tablet  -- 2 tab(s) by mouth 3 times a day  -- Indication: For HTN    Keflex 500 mg oral capsule  -- 1 cap(s) by mouth 2 times a day   -- Finish all this medication unless otherwise directed by prescriber.    -- Indication: For Post operative antibiotics     baclofen 20 mg oral tablet  -- 1 tab(s) by mouth 3 times a day  -- Indication: For Spasm    baclofen  -- 20 mg orally   Baclofen pump auto deliverd unknown dose  -- Indication: For spasm    prednisoLONE acetate 1% ophthalmic suspension  -- 1 drop(s) to each affected eye 4 times a day  -- Indication: For Home medication    Bactrim 400 mg-80 mg oral tablet  -- 2 tab(s) by mouth 2 times a day  -- Indication: For Home medication

## 2019-01-10 NOTE — PROGRESS NOTE ADULT - SUBJECTIVE AND OBJECTIVE BOX
Called by nurse to evaluate pt for left eye pain.  Pt says pain started 1 hour after end of surgery and has been getting worse throughout the day.  Feels like sandpaper in her eye.  On exam, eye appears watery, pt has difficulty opening but no foreign object seen.      Given the history of corneal transplant in that eye, Dr. Olmos from opthalmology was consulted.  He agreed that the most likely diagnosis is corneal abrasion.  He suggested erythromycin ointment twice a day in the eye. Called by nurse to evaluate pt for left eye pain.  Pt says pain started 1 hour after end of surgery and has been getting worse throughout the day.  She states it feels like sandpaper in her eye.  On exam, the eye appears watery, slightly erythematous.  Pt has difficulty opening but no foreign object seen.      Given the history of corneal transplant in that eye, Dr. Olmos from opthalmology was consulted.  He agreed that the most likely diagnosis is corneal abrasion.  He suggested applying erythromycin ointment to the eye.  However, the patient states she cannot use ointments due to her transplant.  I advised her of her likely diagnosis and advised that she call her opthalmologist in the morning if the discomfort persists.

## 2019-01-11 NOTE — PROGRESS NOTE ADULT - ASSESSMENT
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 Came in for [facility] today with nursing aid for Stage ! spinal cord stimulator trial and stage 2 spinal stimulator on 1/16/2019. Patient has hx of worsening back pains which not relieved by pain medications.     PAST MEDICAL & SURGICAL HISTORY:  Dorsalgia of lumbar region: on pain medication /baclofen po and pump  Self-catheterizes urinary bladder  Anemia: chronic  Uveitis  Osteoporosis  PAD (peripheral artery disease)  Hematoma: spinal  September  treated  September 2018  Paraplegia: on wheelchair goes to physical therapy 2 x weekly  Aortic dissection, thoracic: Type A Repaired 2009  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2018  UTI (urinary tract infection): stable x 3 months  TIA (transient ischemic attack)  HTN (Hypertension): on meds  History of corneal transplant: left corneal transplant on 5/21/2018  Disorder of spine: unthetethering 2 x  Presence of IVC filter: 2014 ?  S/P aortic dissection repair: Type A Dissection repair /2009   descending aortic aneurysm repair 9/2016  H/O Spinal surgery: laminectomies 2014    PROCEDURE:  1/10/19 s/p Placement of Spinal cord stimulator battery for chronic pain    PLAN:  Neuro: pain meds prn  Renal: straight cath at home at baseline  ID: afebrile  GI: bowel regimen  d/c IVL and d/c home today  Discussed with patient wound care, follow up plans, activity, and medications. Questions answered, and she verbalized understanding.     Will discuss with Dr Pedro Jose # [phone]    Assessment:  Please Check When Present   []  GCS  E   V  M     Heart Failure: []Acute, [] acute on chronic , []chronic  Heart Failure:  [] Diastolic (HFpEF), [] Systolic (HFrEF), []Combined (HFpEF and HFrEF), [] RHF, [] Pulm HTN, [] Other    [] ANGELIKA, [] ATN, [] AIN, [] other  [] CKD1, [] CKD2, [] CKD 3, [] CKD 4, [] CKD 5, []ESRD    Encephalopathy: [] Metabolic, [] Hepatic, [] toxic, [] Neurological, [] Other    Abnormal Nutritional Status: [] malnutrition (see nutrition note), [ ]underweight: BMI < 19, [] morbid obesity: BMI >40, [] Cachexia    [] Sepsis  [] hypovolemic shock,[] cardiogenic shock, [] hemorrhagic shock, [] neurogenic shock  [] Acute Respiratory Failure  []Cerebral edema, [] Brain compression/ herniation,   [] Functional quadriplegia  [] Acute blood loss anemia

## 2019-01-11 NOTE — BRIEF OPERATIVE NOTE - PROCEDURE
<<-----Click on this checkbox to enter Procedure Spinal cord stimulator replacement  01/11/2019    Active  CATIE

## 2019-01-11 NOTE — BRIEF OPERATIVE NOTE - OPERATION/FINDINGS
Placment of IPG for SCS, upon opening the thoracic area and removing the leads, CSF was encountered, repaired and sealed with duraseal.

## 2019-01-11 NOTE — CHART NOTE - NSCHARTNOTEFT_GEN_A_CORE
CAPRINI SCORE [CLOT] Score on Admission for     AGE RELATED RISK FACTORS                                                       MOBILITY RELATED FACTORS  [ ] Age 41-60 years                                            (1 Point)                  [ ] Bed rest                                                        (1 Point)  [x ] Age: 61-74 years                                           (2 Points)                [ ] Plaster cast                                                   (2 Points)  [ ] Age= 75 years                                              (3 Points)                 [ x] Bed bound for more than 72 hours                 (2 Points)    DISEASE RELATED RISK FACTORS                                               GENDER SPECIFIC FACTORS  [ ] Edema in the lower extremities                       (1 Point)                  [ ] Pregnancy                                                     (1 Point)  [ ] Varicose veins                                               (1 Point)                  [ ] Post-partum < 6 weeks                                   (1 Point)             [ ] BMI > 25 Kg/m2                                            (1 Point)                  [ ] Hormonal therapy  or oral contraception          (1 Point)                 [ ] Sepsis (in the previous month)                        (1 Point)                  [ ] History of pregnancy complications                 (1 point)  [ ] Pneumonia or serious lung disease                                               [ ] Unexplained or recurrent                     (1 Point)           (in the previous month)                               (1 Point)  [ ] Abnormal pulmonary function test                     (1 Point)                 SURGERY RELATED RISK FACTORS (include planned surgeries)  [ ] Acute myocardial infarction                              (1 Point)                 [ ]  Section                                             (1 Point)  [ ] Congestive heart failure (in the previous month)  (1 Point)               [ x] Minor surgery                                                  (1 Point)   [ ] Inflammatory bowel disease                             (1 Point)                [ ] Arthroscopic surgery                                        (2 Points)  [ ] Central venous access                                      (2 Points)               [ ] General surgery lasting more than 45 minutes   (2 Points)       [ ] Stroke (in the previous month)                          (5 Points)               [ ] Elective arthroplasty                                         (5 Points)            [ ] current or past malignancy                              (2 Points)                                                                                                       HEMATOLOGY RELATED FACTORS                                                 TRAUMA RELATED RISK FACTORS  [ ] Prior episodes of VTE                                     (3 Points)                [ ] Fracture of the hip, pelvis, or leg                       (5 Points)  [ ] Positive family history for VTE                         (3 Points)                 [ ] Acute spinal cord injury (in the previous month)  (5 Points)  [ ] Prothrombin 97028 A                                     (3 Points)                 [ ] Paralysis  (less than 1 month)                             (5 Points)  [ ] Factor V Leiden                                             (3 Points)                  [ ] Multiple Trauma within 1 month                        (5 Points)  [ ] Lupus anticoagulants                                     (3 Points)                                                           [ ] Anticardiolipin antibodies                               (3 Points)                                                       [ ] High homocysteine in the blood                      (3 Points)                                             [ ] Other congenital or acquired thrombophilia      (3 Points)                                                [ ] Heparin induced thrombocytopenia                  (3 Points)                                          Total Score [    5      ]    Risk:  Very low 0   Low 1 to 2   Moderate 3 to 4   High =5       VTE Prophylaxis Recommendations:  [ x] mechanical pneumatic compression devices                                      [ ] contraindicated: _____________________  [ ] chemo prophylaxis                                                                          [ ] contraindicated _____________________    **** HIGH LIKELIHOOD DVT PRESENT ON ADMISSION  [ ] (please order LE dopplers within 24 hours of admission)

## 2019-01-11 NOTE — PROGRESS NOTE ADULT - SUBJECTIVE AND OBJECTIVE BOX
CHIEF COMPLAINT: patient reports some incisional pain and wanting to have BM.     Vital Signs Last 24 Hrs  T(C): 36.8 (11 Jan 2019 08:00), Max: 37.5 (10 Armen 2019 20:00)  T(F): 98.2 (11 Jan 2019 08:00), Max: 99.5 (10 Armen 2019 20:00)  HR: 69 (11 Jan 2019 09:00) (64 - 92)  BP: 100/51 (11 Jan 2019 09:00) (100/51 - 177/83)  BP(mean): 82 (11 Jan 2019 08:00) (79 - 120)  RR: 16 (11 Jan 2019 09:00) (11 - 20)  SpO2: 94% (11 Jan 2019 09:00) (92% - 100%)    PHYSICAL EXAM:    General: No Acute Distress     Neurological: Awake, alert oriented to person, place and time, Following Commands, PERRL, EOMI, Face Symmetrical, Speech Fluent,   bilateral UEs 5/5, paraplegia at baseline, No Drift, Sensation to Light Touch Intact    Pulmonary: Clear to Auscultation, No Rales, No Rhonchi, No Wheezes     Cardiovascular: S1, S2, Regular Rate and Rhythm     Gastrointestinal: Soft, Nontender, Nondistended     Incision: +dressings x 2 changed. +steri strips c/d/i    LABS:     01-10 @ 07:01 - 01-11 @ 07:00  --------------------------------------------------------  IN: 2940 mL / OUT: 945 mL / NET: 1995 mL    01-11 @ 07:01 - 01-11 @ 10:49  --------------------------------------------------------  IN: 150 mL / OUT: 0 mL / NET: 150 mL        MEDICATIONS:    Antibiotics:  trimethoprim   80 mG/sulfamethoxazole 400 mG 2 Tablet(s) Oral two times a day    Neuro:  baclofen 20 milliGRAM(s) Oral three times a day  DULoxetine 20 milliGRAM(s) Oral two times a day  gabapentin 300 milliGRAM(s) Oral three times a day  HYDROmorphone  Injectable 0.5 milliGRAM(s) IV Push every 10 minutes PRN Moderate Pain (4 - 6)  levorphanol 2 milliGRAM(s) Oral three times a day  ondansetron Injectable 4 milliGRAM(s) IV Push once PRN Nausea and/or Vomiting  oxyCODONE    IR 10 milliGRAM(s) Oral every 4 hours PRN Moderate Pain (4 - 6)  oxyCODONE    IR 5 milliGRAM(s) Oral once PRN Mild Pain (1 - 3)  zolpidem 5 milliGRAM(s) Oral at bedtime PRN Insomnia  zolpidem 5 milliGRAM(s) Oral at bedtime PRN Insomnia    Cardiac:  labetalol 200 milliGRAM(s) Oral three times a day    GI/:    Other:   prednisoLONE acetate 1% Suspension 1 Drop(s) Both EYES four times a day    DIET: [x] Regular [] CCD [] Renal [] Puree [] Dysphagia [] Tube Feeds:

## 2019-01-22 PROBLEM — G57.93 NEUROPATHIC PAIN, LEG, BILATERAL: Status: ACTIVE | Noted: 2018-07-17

## 2019-01-22 NOTE — REASON FOR VISIT
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   She had undergone multiple untethering procedures and developed progressive spastic paraplegia.  She underwent an intrathecal baclofen trial and pump placement due to good results for the spasticity, which she continued to have radiculopathy.  She underwent  a stage I spinal stimulator trial 1 week prior to the procedure and she had a good response to the trial though she had increased numbness, she felt that her pain was much better controlled.  She elected to undergo a second procedure\par \par She is here today for wound check and to have the stimulator turned on \par

## 2019-01-22 NOTE — ASSESSMENT
[FreeTextEntry1] : 68 year old female wit failed back syndrome s/p spinal cord stimulator placement \par Wounds look good, in process of healing. No signs of infection noted.\par Met with [employer] rep to turn on stimulator\par \par Return in one week for further instruction

## 2019-01-22 NOTE — PHYSICAL EXAM
[General Appearance - Alert] : alert [General Appearance - In No Acute Distress] : in no acute distress [Clean] : clean [Dry] : dry [Well-Healed] : well-healed [Intact] : intact [No Drainage] : without drainage [Normal Skin] : normal [Normal Skin Turgor] : skin turgor was normal [Oriented To Time, Place, And Person] : oriented to person, place, and time

## 2019-02-25 NOTE — PATIENT PROFILE ADULT. - TEACHING/LEARNING FACTORS IMPACT ABILITY TO LEARN
[address], [address]                                                      Phone: [phone]  Urology Consult / Admit Note             Urethral stricture, epididimo orchitis       Assessment & Plan   Assessment  Epididymo-orchitis   Meatal Stenosis- s/p Blandy meatoplasty in 2003 by Dr. Cee Ryder and s/p dialation in 2014  Hx of Urinary retention with JOELLE. Patient is hypertensive and Tachycardic  Hx of UTI's    Plan:   Scortal US reviewed with Dr. Seema Weiss  Patient's exam findings, hx of meatal stenosis with urinary retention, and HPI are more consistent with Epididymo-orchitis and not torsion  Will obtain PVR, UA, BPM, and Urine Culture  Recommend Doxycyline 100 mg BID x 14 days  Needs f/up with our reconstructive team with urine flow and possible RUG  Follow Up arranged: not yet   Bonnie Estrada [facility]  [facility] [place]  Pager # [phone]    Available M-F 7:30- 5pm .  After 5pm and weekends please call answering service at [phone]       I have seen and examined this patient independently, I reviewed pertinent labs and imaging, and I agree with the assessing provider's assessment and plan as outlined above, with ammendments as follows:     Came in with epidimoorchitis   History of urethral stricture   In retention with > 600mL in bladder   Unable to place catheter at bedside, patient does not tolerate    Proceeding to OR for urethral dilation, catheter placement   Will need follow up with Recon,  This will recur      Dasia Peterson MD  [facility] [place], [facility]  Pager [phone]        chief complaint : Right testicular pain and swelling    HISTORY OF PRESENT ILLNESS:    Steve Ramírez is a 56 y.o. male who is seen in consultation as referred by Dr. Dereje Liu  for Right testicular pain/ swelling ? Possible torsion on Scrotal US.   Patient has been seen by a urologist and is not currently receiving urological care. Previously saw Dr. Vamshi Beach Last in 2010 and Dr. Israel Hawk last in 2014. Urological history is significant for Urinary Retention, JOELLE, and Meatal Stenosis s/p Blandy meatoplasty in 2003 by Dr. Vamshi Beach and s/p dialation in 2014 by Dr. Gerardo Arias dilation at home with cone tip dilator. At his last visit with Dr. Gerardo Arias it was recommended he increase dilation to BID and f/up in 3 months to discuss need for redo of his repair. HPI: Patient presented to ER with c/o Right testicular pain that started 2 days ago while he was at work. He works at [employer] and does a lot of heavy lifting. Denies any Trauma to his testicles. No FCNV, abddominal pain, gross hematuria or dysuria. Location : testicle  Duration several days   Associated signs/symptoms UTI 3 weeks ago  Modifying factors none  Severity moderate     Past Medical History:   Diagnosis Date    Hypertension        Past Surgical History:   Procedure Laterality Date    HX UROLOGICAL         Social History     Tobacco Use    Smoking status: Never Smoker    Smokeless tobacco: Never Used   Substance Use Topics    Alcohol use: Yes     Comment: occasionally    Drug use: No       Allergies   Allergen Reactions    Ciprofloxacin Nausea and Vomiting       History reviewed. No pertinent family history. Current Outpatient Medications   Medication Sig Dispense Refill    metoprolol succinate (TOPROL XL) 100 mg XL tablet Take  by mouth daily.  amLODIPine (NORVASC) 10 mg tablet Take 10 mg by mouth daily.  oxyCODONE-acetaminophen (PERCOCET) 5-325 mg per tablet Take 1 Tab by mouth every four (4) hours as needed for Pain.  10 Tab 0       Review of Systems  ROS is:    Negative for: Ophthalmologic issues, ENT issues, Cardiovascular issues, respiratory issues, GI issues, neurologic issues, hematoogic issues, skin lesions, musculoskeletal issues, psychiatric issues  Exceptions: yes    Positive for:    Right Testicular Pain, swelling        PHYSICAL EXAMINATION:   Visit Vitals  BP (!) 146/97 (BP 1 Location: Left arm, BP Patient Position: At rest)   Pulse 100   Temp 98 °F (36.7 °C)   Resp 16   Ht 6' (1.829 m)   Wt 198 lb (89.8 kg)   SpO2 100%   BMI 26.85 kg/m²       Constitutional: Well developed, well nourished male. No acute distress. HEENT: Normocephalic, Atraumatic  CV:  RRR   Pulm: No respiratory distress or difficulties breathing   GI:  No abdominal masses or tenderness. :  No CVA tenderness   ANKIT:Perineum normal to visual inspection, no erythema or irritation: Patient deferred rectal exam   SCROTUM:  No scrotal rash or lesions noticed. Normal bilateral testes and epididymis. PENIS: Severe meatal stenosis. No urethral discharge. Circumcised   Skin: No evidence of jaundice. Normal color  Neuro/Psych:  Alert and oriented. Affect appropriate. Lymphatic:   No inguinal lymphadenopathy   MSK: FROM    REVIEW OF LABS AND IMAGING:      Labs: Results:   Chemistry  No results for input(s): GLU, NA, K, CL, CO2, BUN, CREA, CA, AGAP, BUCR, TBIL, GPT, AP, TP, ALB, GLOB, AGRAT in the last 72 hours. CBC w/Diff No results for input(s): WBC, RBC, HGB, HCT, PLT, GRANS, LYMPH, EOS, HGBEXT, HCTEXT, PLTEXT in the last 72 hours. Cultures No results for input(s): CULT in the last 72 hours.   All Micro Results     None            Urinalysis Color   Date Value Ref Range Status   07/22/2014 YELLOW   Final     Appearance   Date Value Ref Range Status   07/22/2014 CLEAR   Final     Specific gravity   Date Value Ref Range Status   07/22/2014 1.015 1.003 - 1.030   Final     pH (UA)   Date Value Ref Range Status   07/22/2014 7.0 5.0 - 8.0   Final     Protein   Date Value Ref Range Status   07/22/2014 NEGATIVE  NEG mg/dL Final     Ketone   Date Value Ref Range Status   07/22/2014 NEGATIVE  NEG mg/dL Final     Bilirubin   Date Value Ref Range Status   07/22/2014 NEGATIVE  NEG   Final     Blood   Date Value Ref Range Status   07/22/2014 TRACE (A) NEG   Final     Urobilinogen   Date Value Ref Range Status   07/22/2014 0.2 0.2 - 1.0 EU/dL Final     Nitrites   Date Value Ref Range Status   07/22/2014 NEGATIVE  NEG   Final     Leukocyte Esterase   Date Value Ref Range Status   07/22/2014 LARGE (A) NEG   Final     Potassium   Date Value Ref Range Status   07/22/2014 3.9 3.5 - 5.5 mmol/L Final     Creatinine   Date Value Ref Range Status   07/22/2014 1.26 0.6 - 1.3 MG/DL Final     BUN   Date Value Ref Range Status   07/22/2014 22 (H) 7.0 - 18 MG/DL Final      PSA No results for input(s): PSA in the last 72 hours.    Coagulation No results found for: PTP, INR, APTT none

## 2019-03-05 NOTE — PROCEDURE
[Removal: ____ cc] : [unfilled] cc [Increased] : The rate was increased to [___ mcg per day] : [unfilled] mcg per day [Date of Alarm: _____] : with a low alarm date of [unfilled].  [Rate Change %: _____] : This was a rate change of [unfilled]%. Patient will follow-up 3 - 7 days prior for refill.

## 2019-03-05 NOTE — HISTORY OF PRESENT ILLNESS
[FreeTextEntry1] : Patient here for pump refill.\par Still with significant pain.\par Had SCS implanted which helped minimally.\par Still with spasticity of lower extremities but improvement of adductor tone noted with pump.\par Has appointment with pain management scheduled next week for consideration of adding pain med through pump.

## 2019-03-05 NOTE — ASSESSMENT
[FreeTextEntry1] : - Continue current pain medications\par - f/u pain management for consideration adding pain meds through the pump.\par - f/u 6 wks.

## 2019-03-05 NOTE — PHYSICAL EXAM
[Normal] : Oriented to person, place, and time, insight and judgement were intact and the affect was normal [de-identified] :  Elmer 1+ spasticity bl adductors and bl knee flexors

## 2019-04-09 NOTE — REVIEW OF SYSTEMS
[Negative] : Heme/Lymph [de-identified] : spasms- worse at right proximal lower extremity [FreeTextEntry9] : pain per HPI

## 2019-04-09 NOTE — ASSESSMENT
[FreeTextEntry1] : - Follow up for botox to the bilateral adductors\par - To see new pain management physician tomorrow- consider adding pain medications through pump- if this is not done can f/u with me in mid-July for ITB refill.

## 2019-04-09 NOTE — PHYSICAL EXAM
[Normal] : Normal bowel sounds, soft, non-tender, no hepato-splenomegaly and no abdominal mass palpated [de-identified] :  MAS 2+ spasticity bl adductors and bl knee flexors

## 2019-04-09 NOTE — HISTORY OF PRESENT ILLNESS
[FreeTextEntry1] : Patient with c/o significant tightness/spasm- right leg more than left leg.\par Has appointment scheduled with new pain management physician tomorrow.\par Also with feeling of tightness in the ribs.\par No recent medical issues.

## 2019-05-23 PROBLEM — T86.840 CORNEAL GRAFT REJECTION: Status: ACTIVE | Noted: 2019-01-01

## 2019-05-26 NOTE — ED ADULT TRIAGE NOTE - HEIGHT IN INCHES
7 Alert and oriented to person, place, time/situation. normal mood and affect. no apparent risk to self or others.

## 2019-08-12 NOTE — ED ADULT NURSE NOTE - OBJECTIVE STATEMENT
at bedside concerned about pt lethargy and pallor. Pt hx aortic dissection, utis and paraplegia. Pt has baclofen pump and spinal stimulator. pt lethargic yet oriented x3. denies pain at this time.

## 2019-08-12 NOTE — ED PROVIDER NOTE - PROGRESS NOTE DETAILS
CT showing obstructing kidney stone.  Pt. being admitted to ICU with urology consult.  Dr. Cao aware from urology and coming to see the patient. Indwelling hitchcock considered and discussed with ICU but ICU attending does not want indwelling hitchcock at this time and will have ICU continue to straight cath for urine.

## 2019-08-12 NOTE — PROGRESS NOTE ADULT - ASSESSMENT
IMP:    68 y/o female with para admitted to ICU with obst L renal stone s/p cysto and stent placement, UTI sepsis and anemia  Acute post op resp failure    Plan:    Wean vent to extubate  Stop sedation   Cont with pip/twila (1)  Will obtain GI eval once pt is more stable  Keep NPO for now  Follow up labs in AM  DVT prophy--SCD--no chemical     ICU care--d/w ICU staff and

## 2019-08-12 NOTE — H&P ADULT - NSICDXPASTSURGICALHX_GEN_ALL_CORE_FT
PAST SURGICAL HISTORY:  Disorder of spine unthetethering 2 x    H/O Spinal surgery laminectomies 2014    History of corneal transplant left corneal transplant on 5/21/2018    Presence of IVC filter 2014 ?    S/P aortic dissection repair Type A Dissection repair /2009   descending aortic aneurysm repair 9/2016

## 2019-08-12 NOTE — ED ADULT TRIAGE NOTE - CHIEF COMPLAINT QUOTE
Patient states she is impacted, complaining of constipation. Unknown last BM. Hx paralysis, spinal bleed and aortic dissection.

## 2019-08-12 NOTE — ED ADULT NURSE REASSESSMENT NOTE - NS ED NURSE REASSESS COMMENT FT1
Reassessed due to continued tachycarida post completion of PRBC units and LR bolus. Now with significant shivering. Patient remains 98.9 F oral. Rectal temp performed and found to be 100.1. MD Zambrano updated. Awaiting CT results. Will continue outlined plan of care.

## 2019-08-12 NOTE — SEPSIS NOTE - ASSESSMENT
Patient with likely UTI and profound anemia  lactate more likely from anemia, received blood  check repeat lactate  received fluid abx  urine culture ordered Patient with likely UTI and profound anemia  lactate from anemia, and possible sepsis  check repeat lactate  received fluid abx  urine culture ordered, possible

## 2019-08-12 NOTE — BRIEF OPERATIVE NOTE - NSICDXBRIEFPOSTOP_GEN_ALL_CORE_FT
POST-OP DIAGNOSIS:  Acute sepsis 12-Aug-2019 10:07:16  Jorge Lares  Ureteral stone with hydronephrosis 12-Aug-2019 10:07:10  Jorge Lares

## 2019-08-12 NOTE — H&P ADULT - ASSESSMENT
Patient admitted with severe anemia(microcystic), likely from occult blood loss, will send stool for ob, likely chronic process.  with inc lactate  possible UTI infection    will treat UTI with abx. rocephin, urine culture sent will need to continue to self catheterize  I think lactate is probably from profound anemia, will transfuse, check f/u lactate  cta of aortic dissection was performed will check result  will send stool for occult blood  will sent iron studies  GI consult  serial h/h, transfuse  check f/u lactate  sinus tachycardia, will resume labetalol  will give dose iv lopressor now Patient admitted with severe anemia(microcystic), likely from occult blood loss, will send stool for ob, likely chronic process.  with inc lactate  possible UTI infection    will treat UTI with abx. rocephin, urine culture sent will need to continue to self catheterize  I think lactate is probably from profound anemia, will transfuse, check f/u lactate  cta of aabdomen aorta ok but kidney stone, slight hydro, consult urology  will send stool for occult blood  will sent iron studies  serial h/h, transfuse  check f/u lactate  sinus tachycardia, will resume labetalol  will give dose iv lopressor now Patient admitted with severe anemia(microcystic), likely from occult blood loss, will send stool for ob, likely chronic process.  although concern given ? nodules on ct of chest, in face of unexplained microcytic anemia  with inc lactate  Likely UTI, with kidney stone and possible hydro    will treat UTI with abx. Zosyn urine culture sent will need to continue to self catheterize  may need stent, or nephrostomy tube. when stabilized  receiving 3 units prbc, will than recheck cbc  repeat lactate pending   will send stool for occult blood  will sent iron studies  serial h/h, transfuse  sinus tachycardia, will resume labetalol

## 2019-08-12 NOTE — ED PROVIDER NOTE - GASTROINTESTINAL, MLM
Abdomen soft, non-tender, no guarding. +device in RLQ of abdomen without any tenderness or fluctuance RECTAL:  no gross blood; brown stool; diminished tone

## 2019-08-12 NOTE — ED PROVIDER NOTE - OBJECTIVE STATEMENT
Pt. 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   states this week she has been progressively more pale, taking medicines normally without any choking but he noticed some more congestion in the chest.  Pt. also c/o feeling of having to defecate and performs manual disimpaction.  +confusion 2 days ago so family suspected possible UTI.  +sweats and tactile fevers at home per  PMD: Yonas

## 2019-08-12 NOTE — ED ADULT NURSE NOTE - PSH
Disorder of spine  unthetethering 2 x  H/O Spinal surgery  laminectomies 2014  History of corneal transplant  left corneal transplant on 5/21/2018  Presence of IVC filter  2014 ?  S/P aortic dissection repair  Type A Dissection repair /2009   descending aortic aneurysm repair 9/2016

## 2019-08-12 NOTE — ED ADULT NURSE NOTE - PMH
Anemia  chronic  Aortic dissection, thoracic  Type A Repaired 2009  Blindness of left eye  hx corneal transplant 2018  Aug.2018  Dorsalgia of lumbar region  on pain medication /baclofen po and pump  Hematoma  spinal  September  treated  September 2018  HTN (Hypertension)  on meds  Osteoporosis    PAD (peripheral artery disease)    Paraplegia  on wheelchair goes to physical therapy 2 x weekly  Self-catheterizes urinary bladder    TIA (transient ischemic attack)    UTI (urinary tract infection)  stable x 3 months  Uveitis

## 2019-08-12 NOTE — PROGRESS NOTE ADULT - SUBJECTIVE AND OBJECTIVE BOX
CC:  UTI sepsis     HPI:    67 year old female with PMH signficiant for:                   Repair of Aortic Dissection  than                    Spinal Hematoma leading to Paraplegia                    HTN                   chronic pain - has baclofen pump                   self catheterization with chronic UTIs                  ? ivc filter  Pt admitted to ICU with Obst L renal stone, UTI sepsis and Anemia s/p 3u PC tx    :  ICU D # 1.  Pt seen and examined in ICU--Went to OR or cysto--returned back to ICU intubated--Case d/w --stent placed.  Remains hemodynamically stable     PMH:  As above.     PSH:  As above.     FH: Non Contributory other than those listed in HPI    Social History:      MEDICATIONS  (STANDING):  baclofen 20 milliGRAM(s) Oral three times a day  DULoxetine 20 milliGRAM(s) Oral daily  gabapentin 300 milliGRAM(s) Oral three times a day  labetalol 400 milliGRAM(s) Oral every 8 hours  piperacillin/tazobactam IVPB.. 3.375 Gram(s) IV Intermittent Once  piperacillin/tazobactam IVPB.. 3.375 Gram(s) IV Intermittent every 8 hours  polyethylene glycol 3350 17 Gram(s) Oral daily    MEDICATIONS  (PRN):  acetaminophen   Tablet .. 650 milliGRAM(s) Oral every 6 hours PRN Temp greater or equal to 38C (100.4F)      Allergies: NKDA    ROS:  SEE BELOW    Height (cm): 165.1 ( @ 00:10)  Weight (kg): 63.5 ( @ 00:10)  BMI (kg/m2): 23.3 ( @ 00:10)    ICU Vital Signs Last 24 Hrs  T(C): 36.4 (12 Aug 2019 10:00), Max: 39.3 (12 Aug 2019 05:00)  T(F): 97.5 (12 Aug 2019 10:00), Max: 102.7 (12 Aug 2019 05:00)  HR: 130 (12 Aug 2019 10:45) (97 - 154)  BP: 159/71 (12 Aug 2019 10:30) (96/55 - 162/82)  BP(mean): 90 (12 Aug 2019 10:30) (71 - 110)  ABP: --  ABP(mean): --  RR: 18 (12 Aug 2019 10:45) (12 - 23)  SpO2: 100% (12 Aug 2019 10:45) (91% - 100%)          I&O's Summary      Physical Exam:  SEE BELOW                          7.7    30.01 )-----------( 272      ( 12 Aug 2019 06:36 )             24.6       08-12    143  |  109<H>  |  25<H>  ----------------------------<  102<H>  4.5   |  27  |  0.85    Ca    7.6<L>      12 Aug 2019 06:35  Mg     2.7         TPro  6.4  /  Alb  2.6<L>  /  TBili  0.4  /  DBili  x   /  AST  8<L>  /  ALT  9<L>  /  AlkPhos  121<H>  08      CARDIAC MARKERS ( 12 Aug 2019 06:35 )  0.215 ng/mL / x     / x     / x     / x      CARDIAC MARKERS ( 12 Aug 2019 00:36 )  <0.015 ng/mL / x     / 23 U/L / x     / x                Urinalysis Basic - ( 12 Aug 2019 00:39 )    Color: Yellow / Appearance: very cloudy / S.010 / pH: x  Gluc: x / Ketone: Negative  / Bili: Negative / Urobili: Negative mg/dL   Blood: x / Protein: 30 mg/dL / Nitrite: Positive   Leuk Esterase: Moderate / RBC: 3-5 /HPF / WBC 11-25   Sq Epi: x / Non Sq Epi: Few / Bacteria: Moderate        DVT Prophylaxis:                                                            Contraindication:     Advanced Directives:    Discussed with:    Visit Information:  Time spent excluding procedure:  Add;l 45 cc mins    ** Time is exclusive of billed procedures and/or teaching and/or routine family updates.

## 2019-08-12 NOTE — ED PROVIDER NOTE - CLINICAL SUMMARY MEDICAL DECISION MAKING FREE TEXT BOX
Sepsis from UTI and obstructing kidney stone.  Unstable requiring IVFs, empiric antibiotics and blood transfusion.

## 2019-08-12 NOTE — H&P ADULT - NSHPPHYSICALEXAM_GEN_ALL_CORE
General Pale Weak  Neuro - lethargic, slow to answer but appropriate, moves upper extremities strongly, paretic lower extremities  HEENT - pale, mucous membranes  neck no jvd  CV sinus tach,  3/6 systolic murmur  abdomen - distended firm  ext lower extremitis contracted

## 2019-08-12 NOTE — ED PROVIDER NOTE - CARE PLAN
Principal Discharge DX:	Ureteral calculus  Secondary Diagnosis:	Severe anemia  Secondary Diagnosis:	Sepsis, due to unspecified organism

## 2019-08-12 NOTE — PROGRESS NOTE ADULT - ASSESSMENT
Patient is now s/p cystoscopy, left ureteral stent placement.     -continue hitchcock catheter for time being until WBC downtrends and she remains afebrile >24 hours to allow to fully drain bladder of infected urine  -follow up cultures including intra-op kidney culture   -continue IV antibiotics  -IV fluids  -ICU care    Thank you for allowing me to assist in the care of this patient  Please feel free to call with any questions/concerns    Jorge Lares MD  [facility]  W: [phone]

## 2019-08-12 NOTE — BRIEF OPERATIVE NOTE - NSICDXBRIEFPREOP_GEN_ALL_CORE_FT
PRE-OP DIAGNOSIS:  Ureteral stone with hydronephrosis 12-Aug-2019 10:06:48  oJrge Lares  Acute sepsis 12-Aug-2019 10:07:05  Jorge Lares

## 2019-08-12 NOTE — H&P ADULT - NSICDXFAMILYHX_GEN_ALL_CORE_FT
FAMILY HISTORY:  Father  Still living? No  Family history of Alzheimer's disease, Age at diagnosis: Age Unknown    Mother  Still living? Unknown  Family history of Alzheimer's disease, Age at diagnosis: Age Unknown

## 2019-08-12 NOTE — ED ADULT NURSE REASSESSMENT NOTE - NS ED NURSE REASSESS COMMENT FT1
0425: Report called to ICU MAYLIN Bro. Patient has additional 3rd unit PRBC ordered. Will start than transport to floor. ICU team updated about patient's current vitals and status.

## 2019-08-12 NOTE — BRIEF OPERATIVE NOTE - NSICDXBRIEFPROCEDURE_GEN_ALL_CORE_FT
PROCEDURES:  Insertion of double-J stent into ureter 12-Aug-2019 10:06:38  Jorge Lares  Cystoscopy, with retrograde pyelogram 12-Aug-2019 10:06:30  Jorge Lares

## 2019-08-12 NOTE — H&P ADULT - HISTORY OF PRESENT ILLNESS
This patient is a 67 year old female with PMH signficiant for:                   Repair of Aortic Dissection 2009 than 2014                   Spinal Hematoma leading to Paraplegia 2015                   HTN                   chronic pain - has baclofen pump                   self catheterization with chronic UTIs                   has ivc filter  In January patient had hgb of 10.8 but low mcv,    Today she presents with weakness over a week , so bad that today she could barely sit up.  In ED she was found to have hgb of 4.3, with dec mcv, no dark stools, no hx. GI bleeding,  ct done in ED pending  also pyuria, likely UTI  Sinus tach to 150 This patient is a 67 year old female with PMH signficiant for:                   Repair of Aortic Dissection 2009 than 2014                   Spinal Hematoma leading to Paraplegia 2015                   HTN                   chronic pain - has baclofen pump                   self catheterization with chronic UTIs                  ? ivc filter  In January patient had hgb of 10.8 but low mcv,    Today she presents with weakness over a week , so bad that today she could barely sit up.  In ED she was found to have hgb of 4.3, with dec mcv, no dark stools, no hx. GI bleeding,  ct done in ED pending  also pyuria, likely UTI  Sinus tach to 150

## 2019-08-12 NOTE — ED ADULT NURSE REASSESSMENT NOTE - NS ED NURSE REASSESS COMMENT FT1
Patient's heart rate increased from 140s NSR to 150s. Reassessed patient. Patient has distended bladder. patient states she feels as though she needs to be straight cath'ed. Reports she is intermittently catheterized Q4 hours. Updated MD Zambrano and received order to perform intermittent cath. Straight cath performed. 500 mL of dark yellow urine. Patient reports relief of bladder pressure. Remains hypertensive and tachycardic. MD updated.

## 2019-08-12 NOTE — ED ADULT NURSE REASSESSMENT NOTE - NS ED NURSE REASSESS COMMENT FT1
Received report from RN Kaylan Caban and reassessed patient. Agree with the previous RN assessment. Received report at bedside. Patient has PRBC unit #1 infusing. To receive PRBC #2 as fast as possible once finished. 3 PIV placed. LR bolus continued as per order. Patient received CT scan. ICU MD Zambrano is at bedside performing assessment. Patient remains pale, lethargic, and tachycardic. Afebrile. In NSR. Hypertensive. ED MD Heck and ICU MD Zambrano updated on patient's findings. MD Crawley spoke with patient's spouse to update him on status and plan of care. Awaiting CT results and disposition. Will continue plan of care and to monitor/reassess.

## 2019-08-12 NOTE — H&P ADULT - NSICDXPASTMEDICALHX_GEN_ALL_CORE_FT
PAST MEDICAL HISTORY:  Anemia chronic    Aortic dissection, thoracic Type A Repaired 2009    Blindness of left eye hx corneal transplant 2018  Aug.2018    Dorsalgia of lumbar region on pain medication /baclofen po and pump    Hematoma spinal  September  treated  September 2018    HTN (Hypertension) on meds    Osteoporosis     PAD (peripheral artery disease)     Paraplegia on wheelchair goes to physical therapy 2 x weekly    Self-catheterizes urinary bladder     TIA (transient ischemic attack)     UTI (urinary tract infection) stable x 3 months    Uveitis

## 2019-08-13 NOTE — CHART NOTE - NSCHARTNOTEFT_GEN_A_CORE
Pt reported to have one bottle Bcx with Coag negative Staph--likely contaminant.  Repeat Bcx ordered.  Will cont with pip/twila for now.

## 2019-08-13 NOTE — DIETITIAN INITIAL EVALUATION ADULT. - PHYSICAL APPEARANCE
underweight/other (specify) NFPE significant for severe muscle wasting; temporal, clavicle, shoulder.  mild fat wasting; orbital, tricep.  mild Lt/Rt leg and moderate Rt/Lt ankle edema.  (+) constipation  manas score of 12, stage 2 PU on sacrum.

## 2019-08-13 NOTE — CHART NOTE - NSCHARTNOTEFT_GEN_A_CORE
Upon Nutritional Assessment by the Registered Dietitian your patient was determined to meet criteria / has evidence of the following diagnosis/diagnoses:          [ ]  Mild Protein Calorie Malnutrition        [x]  Moderate Protein Calorie Malnutrition        [ ] Severe Protein Calorie Malnutrition        [ ] Unspecified Protein Calorie Malnutrition        [ ] Underweight / BMI <19        [ ] Morbid Obesity / BMI > 40      Findings:  moderate malnutrition in chronic illness r/t decreased ability to consume sufficient energy/protein 2/2 increased needs AEB severe muscle/mild fat wasting; moderate edema.    Findings as based on:  •  Comprehensive nutrition assessment and consultation  •  Calorie counts (nutrient intake analysis)  •  Food acceptance and intake status from observations by staff  •  Follow up  •  Patient education  •  Intervention secondary to interdisciplinary rounds  •   concerns      Treatment:    The following diet has been recommended:  1) add ensure enlive BID and gelatein BID   2) add MVI with minerals daily to ensure 100% RDI met   3) daily wt checks    PROVIDER Section:     By signing this assessment you are acknowledging and agree with the diagnosis/diagnoses assigned by the Registered Dietitian    Comments:

## 2019-08-13 NOTE — DIETITIAN INITIAL EVALUATION ADULT. - OTHER INFO
68yo female admited to ICU with obst L renal stone, UTI, sepsis, anemia s/p 3 U PC, s/p cysto and stent placement. s/p extubation.  Pt reported wt loss, unknown how much or what time.

## 2019-08-13 NOTE — CHART NOTE - NSCHARTNOTEFT_GEN_A_CORE
ACP d/w /HCP as pt was deliriums from sepsis--MOLST along with non capacity forms completed--Pt is FULL resuscitation     Above was on 8/12  [id number]

## 2019-08-13 NOTE — DIETITIAN INITIAL EVALUATION ADULT. - MALNUTRITION
moderate malnutrition in chronic illness r/t decreased ability to consume sufficient energy/protein 2/2 increased needs AEB severe muscle/mild fat wasting; moderate edema. moderate malnutrition in chronic illness

## 2019-08-13 NOTE — PROGRESS NOTE ADULT - SUBJECTIVE AND OBJECTIVE BOX
CC:  Sepsis     HPI:    67 year old female with PMH signficiant for:                   Repair of Aortic Dissection  than                    Spinal Hematoma leading to Paraplegia                    HTN                   chronic pain - has baclofen pump                   self catheterization with chronic UTIs                  ? ivc filter  Pt admitted to ICU with Obst L renal stone, UTI sepsis and Anemia s/p 3u PC tx    :  ICU D # 1.  Pt seen and examined in ICU--Went to OR or cysto--returned back to ICU intubated--Case d/w --stent placed.  Remains hemodynamically stable     :  ICU D # 2.  Pt seen and examined in ICU--extubated post op--awake--hemodynamically stable--UCx GNR.  Hb stable.  Stable for floor        PMH:  As above.     PSH:  As above.     FH: Non Contributory other than those listed in HPI    Social History:  No smoking or ETOH    MEDICATIONS  (STANDING):  baclofen 20 milliGRAM(s) Oral three times a day  DULoxetine 20 milliGRAM(s) Oral daily  gabapentin 300 milliGRAM(s) Oral three times a day  labetalol 400 milliGRAM(s) Oral every 8 hours  oxyCODONE    IR 10 milliGRAM(s) Oral two times a day  piperacillin/tazobactam IVPB.. 3.375 Gram(s) IV Intermittent every 8 hours  polyethylene glycol 3350 17 Gram(s) Oral daily  prednisoLONE acetate 1% Suspension 1 Drop(s) Left EYE four times a day    MEDICATIONS  (PRN):  acetaminophen   Tablet .. 650 milliGRAM(s) Oral every 6 hours PRN Temp greater or equal to 38C (100.4F)  saline laxative (FLEET) Rectal Enema 1 Enema Rectal daily PRN Constipation      Allergies: NKDA    ROS:  SEE BELOW        ICU Vital Signs Last 24 Hrs  T(C): 37.1 (13 Aug 2019 06:05), Max: 37.5 (12 Aug 2019 18:00)  T(F): 98.7 (13 Aug 2019 06:05), Max: 99.5 (12 Aug 2019 18:00)  HR: 74 (13 Aug 2019 08:00) (68 - 130)  BP: 97/48 (13 Aug 2019 08:00) (97/45 - 169/75)  BP(mean): 60 (13 Aug 2019 08:00) (58 - 98)  ABP: --  ABP(mean): --  RR: 16 (13 Aug 2019 08:00) (12 - 28)  SpO2: 97% (13 Aug 2019 08:00) (97% - 100%)      Mode: AC/ CMV (Assist Control/ Continuous Mandatory Ventilation)  RR (machine): 12  TV (machine): 450  FiO2: 50  PEEP: 5  PIP: 23      I&O's Summary    12 Aug 2019 07:01  -  13 Aug 2019 07:00  --------------------------------------------------------  IN: 2134 mL / OUT: 2105 mL / NET: 29 mL        Physical Exam:  SEE BELOW                          7.1    16.73 )-----------( 215      ( 13 Aug 2019 06:24 )             22.4       08-13    147<H>  |  114<H>  |  27<H>  ----------------------------<  121<H>  4.0   |  29  |  0.62    Ca    8.4<L>      13 Aug 2019 06:24  Phos  2.8     08-13  Mg     2.1     08-13    TPro  6.4  /  Alb  2.6<L>  /  TBili  0.4  /  DBili  x   /  AST  8<L>  /  ALT  9<L>  /  AlkPhos  121<H>  08-12      CARDIAC MARKERS ( 12 Aug 2019 06:35 )  0.215 ng/mL / x     / x     / x     / x      CARDIAC MARKERS ( 12 Aug 2019 00:36 )  <0.015 ng/mL / x     / 23 U/L / x     / x                Urinalysis Basic - ( 12 Aug 2019 00:39 )    Color: Yellow / Appearance: very cloudy / S.010 / pH: x  Gluc: x / Ketone: Negative  / Bili: Negative / Urobili: Negative mg/dL   Blood: x / Protein: 30 mg/dL / Nitrite: Positive   Leuk Esterase: Moderate / RBC: 3-5 /HPF / WBC 11-25   Sq Epi: x / Non Sq Epi: Few / Bacteria: Moderate        DVT Prophylaxis:                                                            Contraindication:     Advanced Directives:    Discussed with:    Visit Information:  Time spent excluding procedure:      ** Time is exclusive of billed procedures and/or teaching and/or routine family updates.

## 2019-08-13 NOTE — DIETITIAN INITIAL EVALUATION ADULT. - ADD RECOMMEND
1) add ensure enlive BID and gelatein BID 2) add MVI with minerals daily to ensure 100% RDI met 3) daily wt checks

## 2019-08-13 NOTE — DIETITIAN INITIAL EVALUATION ADULT. - FACTORS AFF FOOD INTAKE
other (specify)/pt reports 3 meals/day normal intake; however, could not provide additional information 2/2 headache

## 2019-08-13 NOTE — DIETITIAN INITIAL EVALUATION ADULT. - PERTINENT MEDS FT
MEDICATIONS  (STANDING):  atorvastatin 40 milliGRAM(s) Oral at bedtime  baclofen 20 milliGRAM(s) Oral two times a day  DULoxetine 20 milliGRAM(s) Oral two times a day  gabapentin 600 milliGRAM(s) Oral three times a day  labetalol 400 milliGRAM(s) Oral every 8 hours  piperacillin/tazobactam IVPB.. 3.375 Gram(s) IV Intermittent every 8 hours  polyethylene glycol 3350 17 Gram(s) Oral daily  prednisoLONE acetate 1% Suspension 1 Drop(s) Left EYE four times a day  valACYclovir 1000 milliGRAM(s) Oral daily    MEDICATIONS  (PRN):  acetaminophen   Tablet .. 650 milliGRAM(s) Oral every 6 hours PRN Temp greater or equal to 38C (100.4F)  mineral oil enema 133 milliLiter(s) Rectal daily PRN Constipation  oxyCODONE    IR 10 milliGRAM(s) Oral four times a day PRN Moderate Pain (4 - 6)

## 2019-08-13 NOTE — PROGRESS NOTE ADULT - ASSESSMENT
Urosepsis 2/2 left ureteral stone with hydronephrosis  s/p cystoscopy, left ureteral stent placement 8/12  POD #1    Plan:    -d/c hitchcock and resume CIC q4-6h  -follow up cultures  -continue IV antibiotics  -continue IV fluids  -stable to leave ICU from  standpoint  -will eventually need definitive stone surgery - may consider to be done during this admission vs outpatient once infection has fully been treated      Thank you for allowing me to assist in the care of this patient  Please feel free to call with any questions/concerns    Jorge Lares MD  [facility]  W: [phone]

## 2019-08-13 NOTE — PROGRESS NOTE ADULT - SUBJECTIVE AND OBJECTIVE BOX
Patient seen and examined at bedside  Extubated yesterday  No acute events overnight  No fevers, chills, nausea, vomiting  Hemodynamically stable    Urine Cx - >100K GNR    Medications  acetaminophen   Tablet .. 650 milliGRAM(s) Oral every 6 hours PRN  baclofen 20 milliGRAM(s) Oral three times a day  DULoxetine 20 milliGRAM(s) Oral daily  gabapentin 300 milliGRAM(s) Oral three times a day  labetalol 400 milliGRAM(s) Oral every 8 hours  lactated ringers. 1000 milliLiter(s) IV Continuous <Continuous>  oxyCODONE    IR 10 milliGRAM(s) Oral two times a day  piperacillin/tazobactam IVPB.. 3.375 Gram(s) IV Intermittent every 8 hours  polyethylene glycol 3350 17 Gram(s) Oral daily  prednisoLONE acetate 1% Suspension 1 Drop(s) Left EYE four times a day      ROS negative otherwise noted above    Vital Signs Last 24 Hrs  T(C): 37.1 (13 Aug 2019 06:05), Max: 37.5 (12 Aug 2019 18:00)  T(F): 98.7 (13 Aug 2019 06:05), Max: 99.5 (12 Aug 2019 18:00)  HR: 74 (13 Aug 2019 08:00) (68 - 130)  BP: 97/48 (13 Aug 2019 08:00) (97/45 - 169/75)  BP(mean): 60 (13 Aug 2019 08:00) (58 - 98)  RR: 16 (13 Aug 2019 08:00) (12 - 28)  SpO2: 97% (13 Aug 2019 08:00) (97% - 100%)    PHYSICAL EXAM:  Constitutional: NAD, lying in bed comfortably   Eyes: EOMI, vision is grossly intact  Back: no CVA tenderness bilaterally  Respiratory: no labored breathing  Cardiovascular: no peripheral edema, cyanosis, or pallor. Extremities are warm and well perfused.   Gastrointestinal: soft, non-tender, non-distended, no guarding, no rebound tenderness. Bladder non-palpable  Genitourinary: hitchcock in place - draining clear yellow urine  Neurological: A&O x3  Psychiatric: normal mood and affect        I/O    08-12-19 @ 07:01  -  08-13-19 @ 07:00  --------------------------------------------------------  IN: 0 mL / OUT: 2105 mL / NET: -2105 mL        Labs:  CBC Full  -  ( 13 Aug 2019 06:24 )  WBC Count : 16.73 K/uL  Hemoglobin : 7.1 g/dL  Hematocrit : 22.4 %  Platelet Count - Automated : 215 K/uL  Mean Cell Volume : 78.0 fl  Mean Cell Hemoglobin : 24.7 pg  Mean Cell Hemoglobin Concentration : 31.7 gm/dL  Auto Neutrophil # : x  Auto Lymphocyte # : x  Auto Monocyte # : x  Auto Eosinophil # : x  Auto Basophil # : x  Auto Neutrophil % : x  Auto Lymphocyte % : x  Auto Monocyte % : x  Auto Eosinophil % : x  Auto Basophil % : x    08-13    147<H>  |  114<H>  |  27<H>  ----------------------------<  121<H>  4.0   |  29  |  0.62    Ca    8.4<L>      13 Aug 2019 06:24  Phos  2.8     08-13  Mg     2.1     08-13    TPro  6.4  /  Alb  2.6<L>  /  TBili  0.4  /  DBili  x   /  AST  8<L>  /  ALT  9<L>  /  AlkPhos  121<H>  08-12        Culture - Urine (collected 12 Aug 2019 00:56)  Source: .Urine None  Preliminary Report (13 Aug 2019 06:55):    >100,000 CFU/ml Gram Negative Rods

## 2019-08-13 NOTE — DIETITIAN INITIAL EVALUATION ADULT. - PERTINENT LABORATORY DATA
08-13 Na147 mmol/L<H> Glu 121 mg/dL<H> K+ 4.0 mmol/L Cr  0.62 mg/dL BUN 27 mg/dL<H> Phos 2.8 mg/dL Alb n/a   PAB n/a

## 2019-08-13 NOTE — PROGRESS NOTE ADULT - ASSESSMENT
IMP:    66 y/o female with para admitted to ICU with obst L renal stone s/p cysto and stent placement, UTI sepsis and anemia  Acute post op resp failure--extubated immediate post op     Plan:    Cont with pip/twila (2)  GI consult with dr nunez  PO diet  DC IVF  DVT prophy--SCD--no chemical     Stable for floor tx--pt signed out and care transferred to hospitalist service--d/w dr Doan via phone at 0900.  d/w pt-- All concerns addressed IMP:    68 y/o female with para admitted to ICU with obst L renal stone s/p cysto and stent placement, UTI sepsis and anemia  Acute post op resp failure--extubated immediate post op   Type 2 ACS from sepsis     Plan:    Cont with pip/twila (2)  GI consult with dr nunez  No ASA given profound anemia--already on BBx  PO diet  DC IVF  DVT prophy--SCD--no chemical     Stable for floor tx--pt signed out and care transferred to hospitalist service--d/w dr Doan via phone at 0900.  d/w pt-- All concerns addressed

## 2019-08-14 NOTE — PROGRESS NOTE ADULT - ASSESSMENT
Imp:  Anemia and guaiac +  Multiple comorbidities as above.    Plan:  IV ABX.   H/H stable, no signs of overt gi bleeding.   Continue to monitor closely.   Plan for eventual EGD and colonoscopy, however pt will be a very difficult prep due to comorbidities.   Discussed with nurse, Dr. Conway/Missy. Imp:  Anemia and guaiac +  Multiple comorbidities as above.    Plan:  IV ABX.   H/H stable, s/p blood transfusions.   Plan for eventual EGD and colonoscopy when patient more stable, however pt will be a very difficult prep due to comorbidities.  Discussed with nurse, Dr. Conway/Missy.

## 2019-08-14 NOTE — PROGRESS NOTE ADULT - SUBJECTIVE AND OBJECTIVE BOX
UROLOGY FOLLOW UP NOTE    SUBJECTIVE    Patient seen and examined at bedside.  No acute events overnight.    Current complaints are:        OBJECTIVE    T(C): 36.7 (08-14-19 @ 12:15), Max: 36.7 (08-14-19 @ 01:00)  HR: 89 (08-14-19 @ 13:00)  BP: 120/91 (08-14-19 @ 13:00)  RR: 18 (08-14-19 @ 13:00)  SpO2: 95% (08-14-19 @ 12:00)    08-13-19 @ 07:01  -  08-14-19 @ 07:00  --------------------------------------------------------  IN: 711 mL / OUT: 1225 mL / NET: -514 mL      PHYSICAL EXAM:  Constitutional: NAD, lying in bed comfortably   Eyes: EOMI, vision is grossly intact  Back: no CVA tenderness bilaterally  Respiratory: no labored breathing  Cardiovascular: no peripheral edema, cyanosis, or pallor. Extremities are warm and well perfused.   Gastrointestinal: soft, non-tender, non-distended, no guarding, no rebound tenderness. Bladder non-palpable  Genitourinary: hitchcock in place - draining clear yellow urine  Neurological: A&O x3  Psychiatric: normal mood and affect      08-14    146<H>  |  114<H>  |  22  ----------------------------<  120<H>  3.8   |  26  |  0.56    Ca    8.9      14 Aug 2019 06:19  Phos  2.8     08-13  Mg     2.1     08-13        CBC Full  -  ( 14 Aug 2019 06:19 )  WBC Count : 12.35 K/uL  Hemoglobin : 6.8 g/dL  Hematocrit : 21.9 %  Platelet Count - Automated : 215 K/uL  Mean Cell Volume : 79.3 fl  Mean Cell Hemoglobin : 24.6 pg  Mean Cell Hemoglobin Concentration : 31.1 gm/dL  Auto Neutrophil # : x  Auto Lymphocyte # : x  Auto Monocyte # : x  Auto Eosinophil # : x  Auto Basophil # : x  Auto Neutrophil % : x  Auto Lymphocyte % : x  Auto Monocyte % : x  Auto Eosinophil % : x  Auto Basophil % : x      acetaminophen   Tablet .. 650 milliGRAM(s) Oral every 6 hours PRN  atorvastatin 40 milliGRAM(s) Oral at bedtime  baclofen 20 milliGRAM(s) Oral two times a day  DULoxetine 20 milliGRAM(s) Oral two times a day  gabapentin 600 milliGRAM(s) Oral three times a day  labetalol 200 milliGRAM(s) Oral every 8 hours  mineral oil enema 133 milliLiter(s) Rectal daily PRN  oxyCODONE    IR 10 milliGRAM(s) Oral four times a day PRN  piperacillin/tazobactam IVPB.. 3.375 Gram(s) IV Intermittent every 8 hours  polyethylene glycol 3350 17 Gram(s) Oral daily  prednisoLONE acetate 1% Suspension 1 Drop(s) Left EYE four times a day  valACYclovir 1000 milliGRAM(s) Oral daily      ASSESSMENT & PLAN    Urosepsis 2/2 left ureteral stone with hydronephrosis  s/p cystoscopy, left ureteral stent placement 8/12    Neurogenic bladder      Plan:    -d/c hitchcock and resume CIC q4-6h  -follow up cultures  -continue IV antibiotics  -continue IV fluids  -stable to leave ICU from  standpoint  -will eventually need definitive stone surgery - may consider to be done during this admission vs outpatient once infection has fully been treated        Thank you for allowing me to participate in the care of this patient  Please call with questions or concerns    Flory Dixon MD    [facility]  [phone] UROLOGY FOLLOW UP NOTE    SUBJECTIVE    Patient seen and examined at bedside.  No acute events overnight.    Current complaints are: back pain (chronic)    She is undergoing work up for her anemia         OBJECTIVE    T(C): 36.7 (08-14-19 @ 12:15), Max: 36.7 (08-14-19 @ 01:00)  HR: 89 (08-14-19 @ 13:00)  BP: 120/91 (08-14-19 @ 13:00)  RR: 18 (08-14-19 @ 13:00)  SpO2: 95% (08-14-19 @ 12:00)    08-13-19 @ 07:01  -  08-14-19 @ 07:00  --------------------------------------------------------  IN: 711 mL / OUT: 1225 mL / NET: -514 mL      PHYSICAL EXAM:  Constitutional: NAD, lying in bed comfortably   Eyes: EOMI, vision is grossly intact  Back: no CVA tenderness bilaterally  Respiratory: no labored breathing  Cardiovascular: no peripheral edema, cyanosis, or pallor. Extremities are warm and well perfused.   Gastrointestinal: soft, non-tender, non-distended, no guarding, no rebound tenderness. Bladder non-palpable  Genitourinary: hitchcock in place - draining clear yellow urine  Neurological: A&O x3  Psychiatric: normal mood and affect      08-14    146<H>  |  114<H>  |  22  ----------------------------<  120<H>  3.8   |  26  |  0.56    Ca    8.9      14 Aug 2019 06:19  Phos  2.8     08-13  Mg     2.1     08-13        CBC Full  -  ( 14 Aug 2019 06:19 )  WBC Count : 12.35 K/uL  Hemoglobin : 6.8 g/dL  Hematocrit : 21.9 %  Platelet Count - Automated : 215 K/uL  Mean Cell Volume : 79.3 fl  Mean Cell Hemoglobin : 24.6 pg  Mean Cell Hemoglobin Concentration : 31.1 gm/dL  Auto Neutrophil # : x  Auto Lymphocyte # : x  Auto Monocyte # : x  Auto Eosinophil # : x  Auto Basophil # : x  Auto Neutrophil % : x  Auto Lymphocyte % : x  Auto Monocyte % : x  Auto Eosinophil % : x  Auto Basophil % : x      acetaminophen   Tablet .. 650 milliGRAM(s) Oral every 6 hours PRN  atorvastatin 40 milliGRAM(s) Oral at bedtime  baclofen 20 milliGRAM(s) Oral two times a day  DULoxetine 20 milliGRAM(s) Oral two times a day  gabapentin 600 milliGRAM(s) Oral three times a day  labetalol 200 milliGRAM(s) Oral every 8 hours  mineral oil enema 133 milliLiter(s) Rectal daily PRN  oxyCODONE    IR 10 milliGRAM(s) Oral four times a day PRN  piperacillin/tazobactam IVPB.. 3.375 Gram(s) IV Intermittent every 8 hours  polyethylene glycol 3350 17 Gram(s) Oral daily  prednisoLONE acetate 1% Suspension 1 Drop(s) Left EYE four times a day  valACYclovir 1000 milliGRAM(s) Oral daily      ASSESSMENT & PLAN    Urosepsis 2/2 left ureteral stone with hydronephrosis  s/p cystoscopy, left ureteral stent placement 8/12  - Sepsis resolving. continue with antibiotics  - Family is interested in potentially treating her urolithiasis while inpatient. We can entertain this possibility for next week if she can be medically cleared.    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

## 2019-08-14 NOTE — PROGRESS NOTE ADULT - ASSESSMENT
68 y/o female with para admitted to ICU with obst L renal stone s/p cysto and stent placement, UTI sepsis and anemia  Acute post op resp failure--extubated immediate post op   Type 2 ACS from sepsis     Plan:    Cont with pip/twila (2)  GI consult with dr nunez  No ASA given profound anemia--already on BBx  PO diet  DC IVF  DVT prophy--SCD--no chemical 68 y/o female with para admitted to ICU      Acute Toxic Metabolic Encephalopathy - multifactorial, due to   Sepsis 2/2 UTI and Obstr Nephropathy  Severe symptomatic anemia 2/2 GI blood loss  Acute post op resp failure--extubated immediate post op   Type 2 ACS from sepsis     Plan:  s/p cysto and left ureteral stent  Cont with pip/twila   GI consult with dr nunez - plan for EGD/COL when stable  No ASA given profound anemia--will order a unit of PRBCs today also  PO diet  DVT prophy--SCD only due to profound bloodloss anemia    discussed with RN  PT/OT  dc planning - after EGD/COL 68 y/o female with para admitted to ICU      Acute Toxic Metabolic Encephalopathy - multifactorial, due to   Sepsis 2/2 UTI and Obstr Nephropathy  Severe symptomatic anemia 2/2 GI blood loss  Acute post op resp failure--extubated immediate post op   Type 2 ACS from sepsis   Other - h/o AA dissection, h/o spinal hematoma and paraplegia    Plan:  s/p cysto and left ureteral stent  Cont with pip/twila   GI consult with dr nunez/andrea - plan for EGD/COL when stable  No ASA given profound anemia--will order a unit of PRBCs today also  PO diet  DVT prophy--SCD only due to profound bloodloss anemia  cont oxy baclofen gabapentin  hold BB as BP too low'  cont statin    discussed with RN  PT/OT  dc planning - after EGD/COL

## 2019-08-14 NOTE — PROGRESS NOTE ADULT - SUBJECTIVE AND OBJECTIVE BOX
Patient is a 68y old  Female who presents with a chief complaint of Lethargy, weakness (13 Aug 2019 13:46)    HPI:  This patient is a 67 year old female with PMH signficiant for:                   Repair of Aortic Dissection 2009 than 2014                   Spinal Hematoma leading to Paraplegia 2015                   HTN                   chronic pain - has baclofen pump                   self catheterization with chronic UTIs                  ? ivc filter  In January patient had hgb of 10.8 but low mcv,    Today she presents with weakness over a week , so bad that today she could barely sit up.  In ED she was found to have hgb of 4.3, with dec mcv, no dark stools, no hx. GI bleeding,  ct done in ED pending  also pyuria, likely UTI  Sinus tach to 150 (12 Aug 2019 03:20)    8/14/2019-  Pt very lethargic and weak.   Difficult time having a conversation with patient as she was very lethargic.   Per RN- no obvious gi bleeding, hx of chronic constipation.     PAST MEDICAL & SURGICAL HISTORY:  Dorsalgia of lumbar region: on pain medication /baclofen po and pump  Self-catheterizes urinary bladder  Anemia: chronic  Uveitis  Osteoporosis  PAD (peripheral artery disease)  Hematoma: spinal  September  treated  September 2018  Paraplegia: on wheelchair goes to physical therapy 2 x weekly  Aortic dissection, thoracic: Type A Repaired 2009  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2018  UTI (urinary tract infection): stable x 3 months  TIA (transient ischemic attack)  HTN (Hypertension): on meds  History of corneal transplant: left corneal transplant on 5/21/2018  Disorder of spine: unthetethering 2 x  Presence of IVC filter: 2014 ?  S/P aortic dissection repair: Type A Dissection repair /2009   descending aortic aneurysm repair 9/2016  H/O Spinal surgery: laminectomies 2014    MEDICATIONS  (STANDING):  atorvastatin 40 milliGRAM(s) Oral at bedtime  baclofen 20 milliGRAM(s) Oral two times a day  DULoxetine 20 milliGRAM(s) Oral two times a day  gabapentin 600 milliGRAM(s) Oral three times a day  labetalol 200 milliGRAM(s) Oral every 8 hours  piperacillin/tazobactam IVPB.. 3.375 Gram(s) IV Intermittent every 8 hours  polyethylene glycol 3350 17 Gram(s) Oral daily  prednisoLONE acetate 1% Suspension 1 Drop(s) Left EYE four times a day  valACYclovir 1000 milliGRAM(s) Oral daily    MEDICATIONS  (PRN):  acetaminophen   Tablet .. 650 milliGRAM(s) Oral every 6 hours PRN Temp greater or equal to 38C (100.4F)  mineral oil enema 133 milliLiter(s) Rectal daily PRN Constipation  oxyCODONE    IR 10 milliGRAM(s) Oral four times a day PRN Moderate Pain (4 - 6)    Allergies  No Known Allergies    FAMILY HISTORY:  Family history of Alzheimer's disease    REVIEW OF SYSTEMS:  CONSTITUTIONAL: + weakness.  No fevers or chills  RESPIRATORY: No cough, wheezing, hemoptysis; No shortness of breath  CARDIOVASCULAR: No chest pain or palpitations  GASTROINTESTINAL: Per HPI.     Vital Signs Last 24 Hrs  T(C): 36.7 (14 Aug 2019 06:00), Max: 36.7 (14 Aug 2019 01:00)  T(F): 98 (14 Aug 2019 06:00), Max: 98.1 (14 Aug 2019 01:00)  HR: 72 (14 Aug 2019 08:00) (64 - 88)  BP: 121/51 (14 Aug 2019 08:00) (82/40 - 157/65)  BP(mean): 68 (14 Aug 2019 08:00) (50 - 88)  RR: 14 (14 Aug 2019 08:00) (6 - 18)  SpO2: 98% (14 Aug 2019 07:00) (91% - 98%)    PHYSICAL EXAM:  Constitutional: Middle aged women with multiple medical issues, appears very lethargic paraplegic    Respiratory: CTAB  Cardiovascular: S1 and S2, RRR, no M/G/R  Gastrointestinal: BS+, soft, NT/ND.    LABS:                        6.8    12.35 )-----------( 215      ( 14 Aug 2019 06:19 )             21.9     08-14    146<H>  |  114<H>  |  22  ----------------------------<  120<H>  3.8   |  26  |  0.56    Ca    8.9      14 Aug 2019 06:19  Phos  2.8     08-13  Mg     2.1     08-13            RADIOLOGY & ADDITIONAL STUDIES:

## 2019-08-14 NOTE — PROGRESS NOTE ADULT - SUBJECTIVE AND OBJECTIVE BOX
PCP    Patient is a 68y old  Female who presents with a chief complaint of Lethargy, weakness (14 Aug 2019 15:03)        SUBJECTIVE:   sleeping, easily arousable or awake  no cp palps sob  no abdo pain  last bm   tolerating a po diet  no tingling or numbness anywhere      T(C): 36.4 (08-14-19 @ 18:30), Max: 36.7 (08-14-19 @ 01:00)  HR: 83 (08-14-19 @ 19:00) (64 - 100)  BP: 142/64 (08-14-19 @ 19:00) (95/45 - 157/79)  RR: 20 (08-14-19 @ 19:00) (6 - 20)  SpO2: 98% (08-14-19 @ 18:30) (91% - 98%)  Wt(kg): --      LABS:                        6.8    12.35 )-----------( 215      ( 14 Aug 2019 06:19 )             21.9     08-14    146<H>  |  114<H>  |  22  ----------------------------<  120<H>  3.8   |  26  |  0.56    Ca    8.9      14 Aug 2019 06:19  Phos  2.8     08-13  Mg     2.1     08-13            CAPILLARY BLOOD GLUCOSE                  RECENT CULTURES:        IMAGING reviewed by me:    EKG/Tele reviewed by me:      PHYSICAL EXAM:  GENERAL: awake, alert, oriented   HEAD:  Atraumatic, Normocephalic  EYES: EOMI, PERRLA, conjunctiva and sclera clear  ENMT: Moist mucous membranes  NECK: Supple, No JVD, thyroid nontender  NERVOUS SYSTEM:  Alert & Oriented X3, PERRL, no facial asymmetry, tongue midline, moving all extremities, no focal motor or sensory deficits  CHEST/LUNG: Clear to auscultation bilaterally; No rales, rhonchi, wheezing, or rubs  HEART: Regular rate and rhythm; No murmurs, rubs, or gallops  ABDOMEN: Soft, Nontender, Nondistended; Bowel sounds present  EXTREMITIES:  2+ Peripheral Pulses, No clubbing, cyanosis, or edema  MUSCULOSKELTAL- no joint swelling or erythema  SKIN-no rash, no lesion  CATHETERS AND LINES:      acetaminophen   Tablet .. 650 milliGRAM(s) Oral every 6 hours PRN  atorvastatin 40 milliGRAM(s) Oral at bedtime  baclofen 20 milliGRAM(s) Oral two times a day  DULoxetine 20 milliGRAM(s) Oral two times a day  gabapentin 600 milliGRAM(s) Oral three times a day  labetalol 200 milliGRAM(s) Oral every 8 hours  mineral oil enema 133 milliLiter(s) Rectal daily PRN  oxyCODONE    IR 10 milliGRAM(s) Oral four times a day PRN  piperacillin/tazobactam IVPB.. 3.375 Gram(s) IV Intermittent every 8 hours  polyethylene glycol 3350 17 Gram(s) Oral daily  prednisoLONE acetate 1% Suspension 1 Drop(s) Left EYE four times a day  valACYclovir 1000 milliGRAM(s) Oral daily PCP    Patient is a 68y old  Female who presents with a chief complaint of Lethargy, weakness (14 Aug 2019 15:03)  Pt was found to be severely anemia (Hb 4.3) s/p PRBCs, FOBT+  pt is s/p cysto and left ureteral stent placement by Uro, was extubated immediately in the ICU, downgraded  has a h/o spinal hematoma, paraplegia and WC-bound  Pt seen at bedside with RN and home aide    SUBJECTIVE:   lethargic, some mumbling but barely opens eyes  had bkft  ROS limited due to underlying clinical condn  will order 1U PRBCstoday        T(C): 36.4 (08-14-19 @ 18:30), Max: 36.7 (08-14-19 @ 01:00)  HR: 83 (08-14-19 @ 19:00) (64 - 100)  BP: 142/64 (08-14-19 @ 19:00) (95/45 - 157/79)  RR: 20 (08-14-19 @ 19:00) (6 - 20)  SpO2: 98% (08-14-19 @ 18:30) (91% - 98%)  Wt(kg): --      LABS:                        6.8    12.35 )-----------( 215      ( 14 Aug 2019 06:19 )             21.9     08-14    146<H>  |  114<H>  |  22  ----------------------------<  120<H>  3.8   |  26  |  0.56    Ca    8.9      14 Aug 2019 06:19  Phos  2.8     08-13  Mg     2.1     08-13      IMAGING reviewed by me:  CTAP - left ureter stone and L HN      PHYSICAL EXAM:  GENERAL: lethargic  HEAD:  Atraumatic, Normocephalic  EYES: EOMI, PERRLA, conjunctiva and sclera clear  ENMT: Moist mucous membranes  NECK: Supple, No JVD, thyroid nontender  NERVOUS SYSTEM:  Alert & Oriented X3, PERRL, no facial asymmetry, tongue midline, moving all extremities, no focal motor or sensory deficits  CHEST/LUNG: Clear to auscultation bilaterally; No rales, rhonchi, wheezing, or rubs  HEART: Regular rate and rhythm; No murmurs, rubs, or gallops  ABDOMEN: Soft, Nontender, Nondistended; Bowel sounds present  EXTREMITIES:  paraplegia      acetaminophen   Tablet .. 650 milliGRAM(s) Oral every 6 hours PRN  atorvastatin 40 milliGRAM(s) Oral at bedtime  baclofen 20 milliGRAM(s) Oral two times a day  DULoxetine 20 milliGRAM(s) Oral two times a day  gabapentin 600 milliGRAM(s) Oral three times a day  labetalol 200 milliGRAM(s) Oral every 8 hours  mineral oil enema 133 milliLiter(s) Rectal daily PRN  oxyCODONE    IR 10 milliGRAM(s) Oral four times a day PRN  piperacillin/tazobactam IVPB.. 3.375 Gram(s) IV Intermittent every 8 hours  polyethylene glycol 3350 17 Gram(s) Oral daily  prednisoLONE acetate 1% Suspension 1 Drop(s) Left EYE four times a day  valACYclovir 1000 milliGRAM(s) Oral daily

## 2019-08-15 NOTE — PROGRESS NOTE ADULT - ASSESSMENT
A/P  Acute Toxic Metabolic Encephalopathy - multifactorial, due to   Sepsis 2/2 UTI and Obstr Nephropathy  Severe symptomatic anemia 2/2 GI blood loss  Acute post op resp failure--extubated immediate post op   Type 2 ACS from sepsis   Other - h/o AA dissection, h/o spinal hematoma and paraplegia    Plan:  s/p cysto and left ureteral stent  Cont with pip/twila   GI consult with dr nunez/andrea - plan for EGD/COL when stable  No ASA given profound anemia-- gave PRBCs yesterday, will monitor  PO diet  DVT prophy--SCD only due to profound bloodloss anemia  cont oxy baclofen gabapentin  will resume BB when BP allows  cont statin  R Hip pain - f/u imaging  discussed with RN and  at bedside - patient and  agree to EGD/COL   all q and concerns addressed  dc planning - after EGD/COL    time spent - 40 mins

## 2019-08-15 NOTE — PROGRESS NOTE ADULT - SUBJECTIVE AND OBJECTIVE BOX
Patient seen and examined at bedside  Feeling better  No pain  No f/c/n/v  No hematuria, dysuria    Medications  acetaminophen   Tablet .. 650 milliGRAM(s) Oral every 6 hours PRN  atorvastatin 40 milliGRAM(s) Oral at bedtime  baclofen 20 milliGRAM(s) Oral two times a day  DULoxetine 20 milliGRAM(s) Oral two times a day  gabapentin 600 milliGRAM(s) Oral three times a day  labetalol 200 milliGRAM(s) Oral every 8 hours  mineral oil enema 133 milliLiter(s) Rectal daily PRN  oxyCODONE    IR 10 milliGRAM(s) Oral four times a day PRN  piperacillin/tazobactam IVPB.. 3.375 Gram(s) IV Intermittent every 8 hours  polyethylene glycol 3350 17 Gram(s) Oral daily  prednisoLONE acetate 1% Suspension 1 Drop(s) Left EYE four times a day  valACYclovir 1000 milliGRAM(s) Oral daily      ROS  General: No  fevers, chills, night sweats, fatigue, malaise  Ophthalmologic: No eye pain,  swelling  ENMT: No hearing changes,  ear pain,  nasal congestion  Respiratory and Thorax: No cough, sputum, dyspnea, wheezing  Cardiovascular: No chest pain, SOB, PND, dyspnea on exertion, orthopnea, edema, palpitations  Gastrointestinal:	No diarrhea, constipation, nausea, vomiting, anorexia, hematochezia, melena.   Genitourinary: see above  Neurological:	 No  syncope, loss of consciousness, headache, dizziness  Psychiatric: No SI/HI/AH/VH.  Hematology/Lymphatics:	No bruising, lymphadenopathy  Endocrine: No temperature intolerance    Vital Signs Last 24 Hrs  T(C): 36.8 (15 Aug 2019 12:07), Max: 37.2 (14 Aug 2019 21:00)  T(F): 98.2 (15 Aug 2019 12:07), Max: 98.9 (14 Aug 2019 21:00)  HR: 89 (15 Aug 2019 12:07) (75 - 92)  BP: 162/66 (15 Aug 2019 12:07) (110/89 - 162/66)  BP(mean): 84 (14 Aug 2019 21:00) (82 - 97)  RR: 19 (15 Aug 2019 12:07) (9 - 20)  SpO2: 95% (15 Aug 2019 12:07) (95% - 98%)    PHYSICAL EXAM:  Constitutional:  NAD, lying in bed comfortably   Eyes: EOMI, vision is grossly intact  Neck: neck supple  Back: no CVA tenderness bilaterally  Respiratory: no labored breathing  Cardiovascular: no peripheral edema, cyanosis, or pallor. Extremities are warm and well perfused.   Gastrointestinal: soft, non-tender, non-distended, no guarding, no rebound tenderness. Bladder non-palpable  Neurological: A&O x3  Psychiatric: normal mood and affect        I/O    08-14-19 @ 07:01  -  08-15-19 @ 07:00  --------------------------------------------------------  IN: 0 mL / OUT: 800 mL / NET: -800 mL        Labs:  CBC Full  -  ( 15 Aug 2019 07:38 )  WBC Count : 10.82 K/uL  Hemoglobin : 7.5 g/dL  Hematocrit : 24.5 %  Platelet Count - Automated : 191 K/uL  Mean Cell Volume : 80.1 fl  Mean Cell Hemoglobin : 24.5 pg  Mean Cell Hemoglobin Concentration : 30.6 gm/dL  Auto Neutrophil # : x  Auto Lymphocyte # : x  Auto Monocyte # : x  Auto Eosinophil # : x  Auto Basophil # : x  Auto Neutrophil % : x  Auto Lymphocyte % : x  Auto Monocyte % : x  Auto Eosinophil % : x  Auto Basophil % : x    08-15    145  |  115<H>  |  14  ----------------------------<  108<H>  4.0   |  26  |  0.54    Ca    8.7      15 Aug 2019 07:38  Mg     2.1     08-15

## 2019-08-15 NOTE — PROGRESS NOTE ADULT - SUBJECTIVE AND OBJECTIVE BOX
Patient is a 68y old  Female who presents with a chief complaint of Lethargy, weakness (14 Aug 2019 19:46)    HPI:  This patient is a 67 year old female with PMH signficiant for:                   Repair of Aortic Dissection 2009 than 2014                   Spinal Hematoma leading to Paraplegia 2015                   HTN                   chronic pain - has baclofen pump                   self catheterization with chronic UTIs                  ? ivc filter  In January patient had hgb of 10.8 but low mcv,    Today she presents with weakness over a week , so bad that today she could barely sit up.  In ED she was found to have hgb of 4.3, with dec mcv, no dark stools, no hx. GI bleeding,  ct done in ED pending  also pyuria, likely UTI  Sinus tach to 150 (12 Aug 2019 03:20)    8/15/2019-  Pt reports feeling better but still very tired and weak.   No overt gi bleeding.   Reports chronic anemia in past.   Now with + occult stool.  No n/v.     PAST MEDICAL & SURGICAL HISTORY:  Dorsalgia of lumbar region: on pain medication /baclofen po and pump  Self-catheterizes urinary bladder  Anemia: chronic  Uveitis  Osteoporosis  PAD (peripheral artery disease)  Hematoma: spinal  September  treated  September 2018  Paraplegia: on wheelchair goes to physical therapy 2 x weekly  Aortic dissection, thoracic: Type A Repaired 2009  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2018  UTI (urinary tract infection): stable x 3 months  TIA (transient ischemic attack)  HTN (Hypertension): on meds  History of corneal transplant: left corneal transplant on 5/21/2018  Disorder of spine: unthetethering 2 x  Presence of IVC filter: 2014 ?  S/P aortic dissection repair: Type A Dissection repair /2009   descending aortic aneurysm repair 9/2016  H/O Spinal surgery: laminectomies 2014    MEDICATIONS  (STANDING):  atorvastatin 40 milliGRAM(s) Oral at bedtime  baclofen 20 milliGRAM(s) Oral two times a day  DULoxetine 20 milliGRAM(s) Oral two times a day  gabapentin 600 milliGRAM(s) Oral three times a day  labetalol 200 milliGRAM(s) Oral every 8 hours  piperacillin/tazobactam IVPB.. 3.375 Gram(s) IV Intermittent every 8 hours  polyethylene glycol 3350 17 Gram(s) Oral daily  prednisoLONE acetate 1% Suspension 1 Drop(s) Left EYE four times a day  valACYclovir 1000 milliGRAM(s) Oral daily    MEDICATIONS  (PRN):  acetaminophen   Tablet .. 650 milliGRAM(s) Oral every 6 hours PRN Temp greater or equal to 38C (100.4F)  mineral oil enema 133 milliLiter(s) Rectal daily PRN Constipation  oxyCODONE    IR 10 milliGRAM(s) Oral four times a day PRN Moderate Pain (4 - 6)    Allergies  No Known Allergies    FAMILY HISTORY:  Family history of Alzheimer's disease    REVIEW OF SYSTEMS:  CONSTITUTIONAL: + weakness.  No fevers or chills  RESPIRATORY: No cough, wheezing, hemoptysis; No shortness of breath  CARDIOVASCULAR: No chest pain or palpitations  GASTROINTESTINAL: Per HPI.     Vital Signs Last 24 Hrs  T(C): 36.8 (15 Aug 2019 05:14), Max: 37.2 (14 Aug 2019 21:00)  T(F): 98.2 (15 Aug 2019 05:14), Max: 98.9 (14 Aug 2019 21:00)  HR: 82 (15 Aug 2019 05:14) (73 - 100)  BP: 131/62 (15 Aug 2019 05:14) (95/45 - 157/79)  BP(mean): 84 (14 Aug 2019 21:00) (57 - 98)  RR: 16 (15 Aug 2019 05:14) (6 - 20)  SpO2: 96% (15 Aug 2019 05:14) (95% - 98%)    PHYSICAL EXAM:  Constitutional: Middle aged paraplegic female in nad, appears fatigue.   Respiratory: CTAB  Cardiovascular: S1 and S2, RRR, no M/G/R  Gastrointestinal: BS+, soft, NT/ND    LABS:                        7.5    10.82 )-----------( 191      ( 15 Aug 2019 07:38 )             24.5     08-15    145  |  115<H>  |  14  ----------------------------<  108<H>  4.0   |  26  |  0.54    Ca    8.7      15 Aug 2019 07:38  Mg     2.1     08-15            RADIOLOGY & ADDITIONAL STUDIES:

## 2019-08-15 NOTE — PROGRESS NOTE ADULT - ASSESSMENT
Urosepsis 2/2 left ureteral stone with hydronephrosis, neurogenic bladder  s/p cystoscopy, left ureteral stent placement 8/12  POD #3    Plan:    -continue IV antibiotics  -repeat urine culture  -continue CIC q4-6h  -GI eval ongoing for anemia and +guaiac   -will eventually need definitive stone surgery - may consider to be done during this admission vs outpatient once infection has fully been treated and GI eval has been completed. She understands.       Thank you for allowing me to assist in the care of this patient  Please feel free to call with any questions/concerns    Jorge aLres MD  [facility]  W: [phone] Urosepsis 2/2 left ureteral stone with hydronephrosis, neurogenic bladder  s/p cystoscopy, left ureteral stent placement 8/12  POD #3    Plan:    -please change abx to IV ceftriaxone 1g daily as intra-op left kidney urine culture is resistant to Zosyn. Plan to complete a total 10 day course of antibiotics.   -repeat urine culture  -continue CIC q4-6h  -GI eval ongoing for anemia and +guaiac   -will eventually need definitive stone surgery - may consider to be done during this admission vs outpatient once infection has fully been treated and GI eval has been completed. She understands.       Thank you for allowing me to assist in the care of this patient  Please feel free to call with any questions/concerns    Jorge Lares MD  [facility]  W: [phone]

## 2019-08-15 NOTE — PROGRESS NOTE ADULT - ASSESSMENT
Imp:  Acute on chronic anemia and guaiac +, slow bleed?   Multiple comorbidities as above.    Plan:  IV ABX.   H/H stable, s/p blood transfusions.   Plan for eventual EGD and colonoscopy when patient more stable, however pt will be a very difficult prep due to comorbidities and needs to be done as an inpatient.   Discussed in length with patient this am about the importance of a gi evaluation, possible slow bleed that caused her acute on chronic anemia despite her stool being "brown."  She is willing to having the endoscopic work up but wants to think about it.  Addressed all her concerns in length, will plan for possibly Monday pending above?   Discussed with Dr. Conway. Imp:  Acute on chronic anemia and guaiac +, slow bleed?   Multiple comorbidities as above.    Plan:  IV ABX.   H/H stable, s/p blood transfusions.   Plan for eventual EGD and colonoscopy when patient more stable, however pt will be a very difficult prep due to comorbidities and needs to be done as an inpatient.   Discussed in length with patient this am about the importance of a gi evaluation, possible slow bleed that caused her acute on chronic anemia despite her stool being "brown."  She is willing to having the endoscopic work up but wants to think about it.  Addressed all her concerns in length, will plan for possible work up on Monday pending above?   Discussed with Dr. Conway.

## 2019-08-15 NOTE — PROGRESS NOTE ADULT - SUBJECTIVE AND OBJECTIVE BOX
Patient is a 68y old  Female who presents with a chief complaint of Lethargy, weakness (15 Aug 2019 14:27)    SUBJECTIVE:   awake alert and conversant, much better than yesterday  no cp palps lightheadedness  got PRBCs yesterday  chr rt hip pain, will image as discussed with     HR: 89 (08-15-19 @ 12:07) (75 - 89)  BP: 162/66 (08-15-19 @ 12:07) (131/62 - 162/66)    LABS:             7.5    10.82 )-----------( 191      ( 15 Aug 2019 07:38 )             24.5   IMAGING reviewed by me:  < from: CT Angio Abdomen and Pelvis w/ IV Cont (08.12.19 @ 02:55) >  Stable appearance of thesurgically repaired thoracic aorta.  Stable appearance of the abdominal aorta.    Tree-in-bud opacities throughout both lungs are more pronounced on the   current exam.    Patchy opacities and solid appearing nodules in both lungs, measuring up   to 9 mm. Some of which are new and some have increased in size in the   interim.    1.2 cm obstructing calculus in the proximal left ureter with   mild-to-moderate hydronephrosis and probable underlying pyelonephritis.   Clinical correlation is recommended.    CHEST: Chronic appearing dissection in the   arch   extends into the innominate and right common carotid artery, terminates   in the   right common carotid artery withoutoccluding the lumen.  The left common   carotid and subclavian arteries remain patent.    Several pulmonary nodules are present in the right lung, metastatic   lesions are   not excluded.      ABDOMEN/PELVIS: Aneurysmal change of the abdominal aorta measuring 4.1   cm, no rupture.    Postoperative changes at the diaphragmatic hiatus.  Abdominal aortic   dissection   extends into the right and left external iliac arteries, no acute   arterial   occlusions seen.  The major branches of the abdominal aorta remain   patent, the   celiac trunk is severely stenotic although its branches remain patent.    7 x 11 mm calcified stone in themid left ureter creating a moderate left   hydronephrosis and a delayed nephrogram.  Mild wall thickening and   enhancement   of the left renal pelvis and proximal ureter could indicate an underlying   infection/UTI.    Distended stomach, filled withfluid and air.  Prominent stool in the   colon,   may represent constipation.      PHYSICAL EXAM:  GENERAL: awake, alert, oriented   HEAD:  Atraumatic, Normocephalic  EYES: EOMI, PERRLA, conjunctiva and sclera clear  ENMT: Moist mucous membranes  NECK: Supple, No JVD, thyroid nontender  NERVOUS SYSTEM:  Alert & Oriented X3, PERRL, no facial asymmetry, tongue midline,paraplegia  CHEST/LUNG: Clear to auscultation bilaterally; No rales, rhonchi, wheezing, or rubs  HEART: Regular rate and rhythm; No murmurs, rubs, or gallops  ABDOMEN: Soft, Nontender, Nondistended; Bowel sounds present  EXTREMITIES:  2+ Peripheral Pulses, No clubbing, cyanosis, or edema  MUSCULOSKELTAL- no joint swelling or erythema, rt hip nontender, no ecchysmosis      acetaminophen   Tablet .. 650 milliGRAM(s) Oral every 6 hours PRN  atorvastatin 40 milliGRAM(s) Oral at bedtime  baclofen 20 milliGRAM(s) Oral two times a day  cefTRIAXone Injectable. 1000 milliGRAM(s) IV Push every 24 hours  DULoxetine 20 milliGRAM(s) Oral two times a day  gabapentin 600 milliGRAM(s) Oral three times a day  labetalol 200 milliGRAM(s) Oral every 8 hours  mineral oil enema 133 milliLiter(s) Rectal daily PRN  oxyCODONE    IR 10 milliGRAM(s) Oral four times a day PRN  polyethylene glycol 3350 17 Gram(s) Oral daily  prednisoLONE acetate 1% Suspension 1 Drop(s) Left EYE four times a day  valACYclovir 1000 milliGRAM(s) Oral daily

## 2019-08-16 NOTE — PROGRESS NOTE ADULT - SUBJECTIVE AND OBJECTIVE BOX
PCP  Patient is a 68y old  Female who presents with a chief complaint of Lethargy, weakness (16 Aug 2019 11:04)    SUBJECTIVE:   no cp palps sob  no abdo pain  still with rt hip pain and xray hip neg for fracture - going on for months     T(C): 36.8 (08-16-19 @ 12:27), Max: 36.8 (08-16-19 @ 12:27)  HR: 77 (08-16-19 @ 12:27) (74 - 83)  BP: 168/71 (08-16-19 @ 12:27) (157/61 - 168/71)  RR: 19 (08-16-19 @ 12:27) (18 - 19)  SpO2: 93% (08-16-19 @ 12:27) (93% - 96%)  Wt(kg): --      LABS:                        8.3    9.20  )-----------( 262      ( 16 Aug 2019 08:08 )             27.1     08-16    142  |  110<H>  |  11  ----------------------------<  93  3.9   |  28  |  0.57    Ca    8.8      16 Aug 2019 08:08  Mg     2.1     08-16    IMAGING reviewed by me:  xray hip - no acute findings, no fracture      PHYSICAL EXAM:  GENERAL: awake, alert, oriented   HEAD:  Atraumatic, Normocephalic  EYES: EOMI, PERRLA, conjunctiva and sclera clear  ENMT: Moist mucous membranes  NECK: Supple, No JVD, thyroid nontender  NERVOUS SYSTEM:  Alert & Oriented X3, PERRL, no facial asymmetry, tongue midline,paraplegia  CHEST/LUNG: Clear to auscultation bilaterally; No rales, rhonchi, wheezing, or rubs  HEART: Regular rate and rhythm; No murmurs, rubs, or gallops  ABDOMEN: Soft, Nontender, Nondistended; Bowel sounds present  EXTREMITIES:  2+ Peripheral Pulses, No clubbing, cyanosis, or edema  MUSCULOSKELTAL- no joint swelling or erythema, rt hip nontender, no ecchysmosis      acetaminophen   Tablet .. 650 milliGRAM(s) Oral every 6 hours PRN  amLODIPine   Tablet 5 milliGRAM(s) Oral daily  atorvastatin 40 milliGRAM(s) Oral at bedtime  baclofen 20 milliGRAM(s) Oral two times a day  cefTRIAXone Injectable. 1000 milliGRAM(s) IV Push every 24 hours  DULoxetine 20 milliGRAM(s) Oral two times a day  gabapentin 600 milliGRAM(s) Oral three times a day  labetalol 200 milliGRAM(s) Oral every 8 hours  lidocaine   Patch 1 Patch Transdermal daily  metroNIDAZOLE    Tablet 500 milliGRAM(s) Oral every 8 hours  mineral oil enema 133 milliLiter(s) Rectal daily PRN  oxyCODONE    IR 10 milliGRAM(s) Oral four times a day PRN  polyethylene glycol 3350 17 Gram(s) Oral two times a day  prednisoLONE acetate 1% Suspension 1 Drop(s) Left EYE four times a day  valACYclovir 1000 milliGRAM(s) Oral daily

## 2019-08-16 NOTE — PROGRESS NOTE ADULT - ASSESSMENT
A/P  Acute Toxic Metabolic Encephalopathy - multifactorial, due to   Sepsis 2/2 UTI and Obstr Nephropathy  Severe symptomatic anemia 2/2 GI blood loss  Acute post op resp failure--extubated immediate post op   Type 2 ACS from sepsis   Other - h/o AA dissection, h/o spinal hematoma and paraplegia    Plan:  s/p cysto and left ureteral stent - further mx after egd/col/when more stable  was on pip tazo, now on rocephin; staph hominis contaminant, seen by ID  GI consult with dr nunez/andrea - plan for EGD/COL when stable - possibly monday  No ASA given profound anemia-- gave PRBCs yesterday, will monitor  PO diet  DVT Px--SCD only due to profound bloodloss anemia  cont oxy baclofen gabapentin  will resume BB when BP allows  cont statin  R Hip pain - f/u imaging neg  discussed with RN and  at bedside - patient and  agree to EGD/COL   all q and concerns addressed  dc planning - after EGD/COL    time spent - 40 mins, discussed with family, all q answered

## 2019-08-16 NOTE — PROGRESS NOTE ADULT - SUBJECTIVE AND OBJECTIVE BOX
Patient is a 68y old  Female who presents with a chief complaint of Lethargy, weakness (15 Aug 2019 19:41)    HPI:  This patient is a 67 year old female with PMH signficiant for:                   Repair of Aortic Dissection 2009 than 2014                   Spinal Hematoma leading to Paraplegia 2015                   HTN                   chronic pain - has baclofen pump                   self catheterization with chronic UTIs                  ? ivc filter  In January patient had hgb of 10.8 but low mcv,    Today she presents with weakness over a week , so bad that today she could barely sit up.  In ED she was found to have hgb of 4.3, with dec mcv, no dark stools, no hx. GI bleeding,  ct done in ED pending  also pyuria, likely UTI  Sinus tach to 150 (12 Aug 2019 03:20)    8/16/2019-  Pt feels better, less weak.   Wants to have gi work up on Monday.   Will need commode per pt's request.   Tolerating diet, no n/v.   No overt gi bleeding.     PAST MEDICAL & SURGICAL HISTORY:  Dorsalgia of lumbar region: on pain medication /baclofen po and pump  Self-catheterizes urinary bladder  Anemia: chronic  Uveitis  Osteoporosis  PAD (peripheral artery disease)  Hematoma: spinal  September  treated  September 2018  Paraplegia: on wheelchair goes to physical therapy 2 x weekly  Aortic dissection, thoracic: Type A Repaired 2009  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2018  UTI (urinary tract infection): stable x 3 months  TIA (transient ischemic attack)  HTN (Hypertension): on meds  History of corneal transplant: left corneal transplant on 5/21/2018  Disorder of spine: unthetethering 2 x  Presence of IVC filter: 2014 ?  S/P aortic dissection repair: Type A Dissection repair /2009   descending aortic aneurysm repair 9/2016  H/O Spinal surgery: laminectomies 2014    MEDICATIONS  (STANDING):  atorvastatin 40 milliGRAM(s) Oral at bedtime  baclofen 20 milliGRAM(s) Oral two times a day  cefTRIAXone Injectable. 1000 milliGRAM(s) IV Push every 24 hours  DULoxetine 20 milliGRAM(s) Oral two times a day  gabapentin 600 milliGRAM(s) Oral three times a day  labetalol 200 milliGRAM(s) Oral every 8 hours  polyethylene glycol 3350 17 Gram(s) Oral daily  prednisoLONE acetate 1% Suspension 1 Drop(s) Left EYE four times a day  valACYclovir 1000 milliGRAM(s) Oral daily    MEDICATIONS  (PRN):  acetaminophen   Tablet .. 650 milliGRAM(s) Oral every 6 hours PRN Temp greater or equal to 38C (100.4F)  mineral oil enema 133 milliLiter(s) Rectal daily PRN Constipation  oxyCODONE    IR 10 milliGRAM(s) Oral four times a day PRN Moderate Pain (4 - 6)    Allergies  No Known Allergies    FAMILY HISTORY:  Family history of Alzheimer's disease    REVIEW OF SYSTEMS:  CONSTITUTIONAL: No weakness, fevers or chills  RESPIRATORY: No cough, wheezing, hemoptysis; No shortness of breath  CARDIOVASCULAR: No chest pain or palpitations  GASTROINTESTINAL: Per HPI.     Vital Signs Last 24 Hrs  T(C): 36.7 (16 Aug 2019 05:20), Max: 36.8 (15 Aug 2019 12:07)  T(F): 98 (16 Aug 2019 05:20), Max: 98.2 (15 Aug 2019 12:07)  HR: 74 (16 Aug 2019 05:20) (74 - 89)  BP: 165/71 (16 Aug 2019 05:20) (157/61 - 165/71)  BP(mean): --  RR: 18 (16 Aug 2019 05:20) (18 - 19)  SpO2: 94% (16 Aug 2019 05:20) (94% - 96%)    PHYSICAL EXAM:  Constitutional: Middle aged female in NAD, paraplegic.   Respiratory: CTAB  Cardiovascular: S1 and S2, RRR, no M/G/R  Gastrointestinal: BS+, soft, NT/ND    LABS:                        8.3    9.20  )-----------( 262      ( 16 Aug 2019 08:08 )             27.1     08-16    142  |  110<H>  |  11  ----------------------------<  93  3.9   |  28  |  0.57    Ca    8.8      16 Aug 2019 08:08  Mg     2.1     08-15            RADIOLOGY & ADDITIONAL STUDIES:

## 2019-08-16 NOTE — PHARMACOTHERAPY INTERVENTION NOTE - COMMENTS
Pt rec'd 1 time zosyn in the ER. Recommend to reorder zosyn for UTI
Contacted provider due to elevated BP in the 160s, provider approved amlodipine 5mg stat and then daily thereafter
Contacted provider regardign no order for mild pain (only for moderate). Provider ok'd the change of APAP to as needed for fever or mild pain
Mineral Oil enema is the formulary product available. Called prescriber and initiated switch from Saline enema to Mineral Oil enema.
Pt admitted to ICU, performed med rec by calling pt's  to verify med list from Dr. Santos as pt very lethargic to answer questions.
Pt admitted with UTI, rec'd IV fluid bolus in ER. Currently, NPO. Recommend to order maintenance fluid
Recommend to change home medications back to home dose: baclofen 20 mg TID to BID, Gabapentin 300 mg TID to 600 mg TID, duloxetine 20mg QD to 20 mg BID
Recommend to restart home medications: valtrex once daily and atorvastatin

## 2019-08-16 NOTE — PROGRESS NOTE ADULT - ASSESSMENT
Problem List Items Addressed This Visit        Unprioritized    Hypothyroidism - Primary     Thyroid functions look normal on  labs. Patient is currently on levothyroxine at 125 µg once daily.   Continue the same dose of medications and will recheck labs i Imp:  Acute on chronic anemia and guaiac +, slow bleed?   Multiple comorbidities as above.    Plan:  IV ABX.   H/H stable, s/p blood transfusions.   Plan for EGD and colonoscopy on Monday, will need a 2 day prep and assistance onto the commode to assist with bowel movements as patient normally needs to self disimpact herself. She will be a difficult prep and need to be done as an inpatient.   Start miralax BID now.     Recommend:   Saturday - Full liquids and mag citrate tomorrow evening.   Sunday - Clear liquids with Golytely starting at 2pm and finishing by 11:30pm.      Discussed with hai HILL. Imp:  Acute on chronic anemia and guaiac +, slow bleed?   Multiple comorbidities as above.    Plan:  IV ABX.   H/H stable, s/p blood transfusions.   Plan for EGD and colonoscopy on Monday, will need a 2 day prep and assistance onto the commode to assist with bowel movements as patient normally needs to self disimpact herself. She will be a difficult prep and need to be done as an inpatient.   Start miralax BID now.     Recommend:   Saturday - Please start full liquids after lunch tomorrow and mag citrate tomorrow evening.   Sunday - Clear liquids with Golytely starting at 2pm and finishing by 11:30pm.      Discussed with NMDr. Conway.

## 2019-08-17 NOTE — CHART NOTE - NSCHARTNOTEFT_GEN_A_CORE
68 year old female admitted due to sepsis 2/2 uteral calculus. Patient's  noted that the patient appeared very sweaty, tired and was not responding as quickly as she normally would.  alerted RN who took vitals and noted SBP ~ 70's.  RRT called at 17:49. When RRT team arrived at bedside, patient notes that she feels "very hot". Denies CP, SOB, abdominal pain. Per RN, patient BP ~130/80's at 2pm and patient received Labetalol. Per  at bedside, patient had similar appearance prior to admission prompting his calling EMS.     Vitals: T 97.6 BP 74/44 HR 85 RR 16 O2 sat 97%     PE:   Gen: diaphoretic, AOx3, arousable to voice and touch  CV: RRR, no murmurs auscultated  REsp: CTABL, no accessory muscles used  Abd: soft, nontender, + BS  Ext: no peripheral edema noted    A/P: 68 year old female now with AMS and hypotension.   DDX: infection vs. acute blood loss vs. medication induced  - SIRS criteria not met at this time.   - Stat 500 cc bolus  - STAT CBC, Lactate  - Dr. Aguilar paged and at bedside.  - on chart review- Amlodipine and Lisinopril added in past 48 hours to patient's home regimen of labetalol 200mg BID.     Dr. Winn, intensivist present.     addendum:                         7.7    11.12 )-----------( 364      ( 17 Aug 2019 18:10 )             24.9 68 year old female admitted due to sepsis 2/2 uteral calculus and anemia s/p blood transfusion.     Patient's  noted that the patient appeared very sweaty, tired and was not responding as quickly as she normally would.  alerted RN who took vitals and noted SBP ~ 70's.  RRT called at 17:49. When RRT team arrived at bedside, patient notes that she feels "very hot". Denies CP, SOB, abdominal pain. Per RN, patient BP ~130/80's at 2pm and patient received Labetalol. Per  at bedside, patient had similar appearance prior to admission prompting his calling EMS.     Vitals: T 97.6 BP 74/44 HR 85 RR 16 O2 sat 97%     PE:   Gen: diaphoretic, AOx3, arousable to voice and touch  CV: RRR, no murmurs auscultated  REsp: CTABL, no accessory muscles used  Abd: soft, nontender, + BS, mildly distended.  Ext: no peripheral edema noted    A/P: 68 year old female now with AMS and hypotension.     DDX: infection vs. acute blood loss vs. medication induced  - SIRS criteria not met at this time.   - Stat 500 cc bolus  - STAT CBC, Lactate  - on chart review- Amlodipine and Lisinopril added in past 48 hours to patient's home regimen of labetalol 200mg BID.   - Dr. Jeff cavazos and at bedside.  - Antihypertensives D/C'd at this time      Dr. Winn, intensivist present.     addendum:                         7.7    11.12 )-----------( 364      ( 17 Aug 2019 18:10 )             24.9 68 year old female admitted due to sepsis 2/2 uteral calculus and anemia s/p blood transfusion.     Patient's  noted that the patient appeared very sweaty, tired and was not responding as quickly as she normally would.  alerted RN who took vitals and noted SBP ~ 70's.  RRT called at 17:49. When RRT team arrived at bedside, patient notes that she feels "very hot". Denies CP, SOB, abdominal pain. Per RN, patient BP ~130/80's at 2pm and patient received Labetalol. Per  at bedside, patient had similar appearance prior to admission prompting his calling EMS.     Vitals: T 97.6 BP 74/44 HR 85 RR 16 O2 sat 97%     PE:   Gen: diaphoretic, AOx3, arousable to voice and touch  CV: RRR, no murmurs auscultated  REsp: CTABL, no accessory muscles used  Abd: soft, nontender, + BS, mildly distended.  Ext: no peripheral edema noted    A/P: 68 year old female now with AMS and hypotension.     DDX: infection vs. acute blood loss vs. medication induced  - SIRS criteria not met at this time.   - Stat 500 cc bolus  - STAT CBC, Lactate  - on chart review- Amlodipine and Lisinopril added in past 48 hours to patient's home regimen of labetalol 200mg BID.   - Dr. Jeff cavazos and at bedside.  - Antihypertensives D/C'd at this time      Dr. Winn, intensivist present.     addendum:     Lactate, Blood: 1.5 mmol/L (08.17.19 @ 18:10)                          7.7    11.12 )-----------( 364      ( 17 Aug 2019 18:10 )             24.9

## 2019-08-17 NOTE — PROGRESS NOTE ADULT - SUBJECTIVE AND OBJECTIVE BOX
responded to rapid response.  Patient admitted with obstructing kidney stone, anemia to 4.3  guiac positive but no active bleeding  RRT called for low bp   patient no fever, not tachycardia  as noted in last 46 hours had norvasc and lisinopril added, and got oxycodone few hours ago  doubt recurrent sepsis  check hgb  hold antihypertensives,  give fluid bolus now

## 2019-08-17 NOTE — PROGRESS NOTE ADULT - SUBJECTIVE AND OBJECTIVE BOX
no complaints except right flank/back pain  Tolerating diet, no n/v.   No overt gi bleeding.     REVIEW OF SYSTEMS:  CONSTITUTIONAL: No weakness, fevers or chills  RESPIRATORY: No cough, wheezing, hemoptysis; No shortness of breath  CARDIOVASCULAR: No chest pain or palpitations  GASTROINTESTINAL: Per HPI.     Vital Signs Last 24 Hrs  T(C): 36.4 (17 Aug 2019 17:53), Max: 37.5 (16 Aug 2019 21:00)  T(F): 97.6 (17 Aug 2019 17:53), Max: 99.5 (16 Aug 2019 21:00)  HR: 77 (17 Aug 2019 18:47) (68 - 85)  BP: 96/52 (17 Aug 2019 18:47) (74/44 - 164/71)  BP(mean): --  RR: 18 (17 Aug 2019 17:41) (16 - 18)  SpO2: 97% (17 Aug 2019 17:53) (94% - 100%)    PHYSICAL EXAM:  Constitutional: Middle aged female in NAD, paraplegic.   Respiratory: CTAB  Cardiovascular: S1 and S2, RRR, no M/G/R  Gastrointestinal: BS+, soft, NT/ND  no edema    LABS:                                 7.7    11.12 )-----------( 364      ( 17 Aug 2019 18:10 )             24.9

## 2019-08-17 NOTE — CHART NOTE - NSCHARTNOTEFT_GEN_A_CORE
pt hypotensive, RRT called  will hold all BP meds, EKG rev by me no acute changes  Hb about the same  discussed with RRT.  s/p IVFs NS: SBP now 90s  pt seen afterwards - no cp palps sob lightheadedness  mental status back to baseline  discussed with CC attending, pt's PMD Dr Branham  will get Card Cx to help manage very labile BPs   PA to follow overnight  45 mins spent

## 2019-08-17 NOTE — PROGRESS NOTE ADULT - SUBJECTIVE AND OBJECTIVE BOX
SUBJECTIVE:   awake, H&H stable  left eye dryness  no cp palps lightheadedness, feeling better       T(C): 36.8 (08-17-19 @ 11:18), Max: 37.5 (08-16-19 @ 21:00)  HR: 73 (08-17-19 @ 14:20) (68 - 78)  BP: 139/67 (08-17-19 @ 14:20) (139/67 - 164/71)  RR: 18 (08-17-19 @ 11:18) (16 - 18)  SpO2: 96% (08-17-19 @ 11:18) (94% - 96%)  Wt(kg): --  LABS:                        9.0    8.85  )-----------( 279      ( 17 Aug 2019 07:34 )             30.0       IMAGING reviewed by me:  xray hip - no acute findings, no fracture      PHYSICAL EXAM:  GENERAL: awake, alert, oriented   HEAD:  Atraumatic, Normocephalic  EYES: EOMI, PERRLA, conjunctiva and sclera clear, left eye post-surgical, soft nontender, dry  ENMT: Moist mucous membranes  NECK: Supple, No JVD, thyroid nontender  NERVOUS SYSTEM:  Alert & Oriented X3, PERRL, no facial asymmetry, tongue midline,paraplegia  CHEST/LUNG: Clear to auscultation bilaterally; No rales, rhonchi, wheezing, or rubs  HEART: Regular rate and rhythm; No murmurs, rubs, or gallops  ABDOMEN: Soft, Nontender, Nondistended; Bowel sounds present  EXTREMITIES:  2+ Peripheral Pulses, No clubbing, cyanosis, or edema  MUSCULOSKELETAL- no joint swelling or erythema, rt hip nontender, no ecchysmosis    acetaminophen   Tablet .. 650 milliGRAM(s) Oral every 6 hours PRN  amLODIPine   Tablet 5 milliGRAM(s) Oral daily  artificial  tears Solution 1 Drop(s) Left EYE three times a day  atorvastatin 40 milliGRAM(s) Oral at bedtime  baclofen 20 milliGRAM(s) Oral two times a day  cefTRIAXone Injectable. 1000 milliGRAM(s) IV Push every 24 hours  DULoxetine 20 milliGRAM(s) Oral two times a day  gabapentin 600 milliGRAM(s) Oral three times a day  labetalol 200 milliGRAM(s) Oral every 8 hours  lidocaine   Patch 1 Patch Transdermal daily  lisinopril 10 milliGRAM(s) Oral daily  magnesium citrate Oral Solution 1 Bottle Oral once  metroNIDAZOLE    Tablet 500 milliGRAM(s) Oral every 8 hours  mineral oil enema 133 milliLiter(s) Rectal daily PRN  oxyCODONE    IR 10 milliGRAM(s) Oral four times a day PRN  polyethylene glycol 3350 17 Gram(s) Oral two times a day  prednisoLONE acetate 1% Suspension 1 Drop(s) Left EYE four times a day  valACYclovir 1000 milliGRAM(s) Oral daily

## 2019-08-17 NOTE — PROGRESS NOTE ADULT - ASSESSMENT
Imp:  Acute on chronic anemia and guaiac +, slow bleed?     Plan:  monitor CBC  Plan for EGD and colonoscopy on Monday, will need a 2 day prep and assistance onto the commode to assist with bowel movements as patient normally needs to self disimpact herself. She will be a difficult prep and need to be done as an inpatient.   Start miralax BID now.   mag citrate tonight  Sunday - Clear liquids with Golytely starting at 2pm and finishing by 11:30pm.    d/w pt

## 2019-08-17 NOTE — PROGRESS NOTE ADULT - ASSESSMENT
A/P  Acute Toxic Metabolic Encephalopathy - multifactorial, due to   Sepsis 2/2 UTI and Obstr Nephropathy  Severe symptomatic anemia 2/2 GI blood loss  Acute post op resp failure--extubated immediate post op   Type 2 ACS from sepsis   Other - h/o AA dissection, h/o spinal hematoma and paraplegia    Plan:  s/p cysto and left ureteral stent - further mx after egd/col/when more stable  was on pip tazo, now on rocephin/flagyl (day 8 of total ten days of antibiotics); staph hominis contaminant, seen by ID  GI consult with dr nunez/andrea - plan for EGD/COL when stable - monday  pt has started her prep: FL after lunch and MgCItrate tonight per GI  on Sunday: CL with GoLytely at 2pm  No ASA given profound anemia-- s/p PRBCs  PO diet  DVT Px--SCD only due to profound bloodloss anemia  cont oxy baclofen gabapentin  will resume BB when BP allows  cont statin  R Hip pain - f/u imaging neg  discussed with RN and  at bedside - patient and  agree to EGD/COL   all q and concerns addressed  dc planning - after EGD/COL

## 2019-08-18 NOTE — CHART NOTE - NSCHARTNOTEFT_GEN_A_CORE
RRT called at 8:03 pm. Pt is 67y/o F, PMHx CAD, RBBB, and resp failure w/ intubation this admission, now unresponsive with labored breathing    Pt seen and examined at bedside; unresponsive and labored breathing. Tachy with HR in 120's, hypotensive and hypoxic at O2 Sat 83. Pt Placed on non-rebreather mask which increased O2sat to %. 1L NS bolus given. Radial pulse was present but faint. STAT EKG performed which showed sinus tachy. Pt became responsive to questioning at 8:15pm and CCU was called for pt transfer.    VS:    BP 72/44  RR: 24  O2Sat: 96% RA    O2Sat: 100% Non Rebreather    PE:   General: Lethargic, unresponsive to questions.  Cardiac: Tachy, +S1/S2, Faint radial pulse  Pulm: Labored breathing    A/P;  67 y/o F admitted for acute metabolic encephalopathy, s/o bowel prep for GI procedure, now increasingly lethargic and hypotensive    -1L NS Bolus   -STAT EKG-> Sinus Tachy  -R/P CBC, BMP, Mg, Phos  -ABG  -Pt transfer to CCU     Case discussed with Dr. Kayserian and Dr. Smith

## 2019-08-18 NOTE — PROGRESS NOTE ADULT - ASSESSMENT
A/P  Acute Toxic Metabolic Encephalopathy - multifactorial, due to   Sepsis 2/2 UTI and Obstr Nephropathy  Severe symptomatic anemia 2/2 GI blood loss  Acute post op resp failure--extubated immediate post op   Type 2 ACS from sepsis   Other - h/o AA dissection, h/o spinal hematoma and paraplegia    Plan:  s/p cysto and left ureteral stent - further mx after egd/col/when more stable  was on pip tazo, now on rocephin/flagyl; staph hominis contaminant, seen by ID  GI consult with dr nunez/andrea - plan for EGD/COL when stable - monday  pt has started her prep: FL after lunch and MgCItrate tonight per GI  on Sunday: CL with GoLytely at 2pm  No ASA given profound anemia-- s/p PRBCs  PO diet  DVT Px--SCD only due to profound bloodloss anemia  cont oxy baclofen gabapentin  will resume BB when BP allows  cont statin  R Hip pain - f/u imaging neg  patient and  agree to EGD/COL   dc planning - after EGD/COL, undergoing prep; reconsult Urology if any other procedures to be done as IP    HTN by history and very labile BPs, was hypotensive yesterday, RRT was called, better after IVFs will resume her labetalol today with parameters. Not on amlodipine or ACEi at this time  Card Cx  Left a msg for her PMD Basil Branham

## 2019-08-18 NOTE — PROVIDER CONTACT NOTE (CHANGE IN STATUS NOTIFICATION) - BACKGROUND
Pt was also a rapid response saturday 8/17 on day shift for hypotension. Pt was preparing for endo and scope to be done monday for low hemoglobin and positive stool OB.

## 2019-08-18 NOTE — PROGRESS NOTE ADULT - SUBJECTIVE AND OBJECTIVE BOX
Patient is a 68y old  Female who presents with a chief complaint of Lethargy, weakness (18 Aug 2019 11:53)    SUBJECTIVE:   BPs stable, no cp palps sob  conversant      T(C): 36.8 (08-18-19 @ 11:12), Max: 36.8 (08-17-19 @ 20:00)  HR: 108 (08-18-19 @ 06:39) (70 - 108)  BP: 145/79 (08-18-19 @ 06:39) (89/50 - 145/79)  RR: 18 (08-18-19 @ 11:12) (16 - 18)  SpO2: 96% (08-18-19 @ 11:12) (96% - 100%)  Wt(kg): --      LABS:                        7.4    9.75  )-----------( 390      ( 18 Aug 2019 06:54 )             24.8     EKG/Tele reviewed by me:  sinus no acute st/tw changes     PHYSICAL EXAM:  GENERAL: awake, alert, oriented   HEAD:  Atraumatic, Normocephalic  EYES: EOMI, PERRLA, conjunctiva and sclera clear, left eye post-surgical, soft nontender, dry  ENMT: Moist mucous membranes  NECK: Supple, No JVD, thyroid nontender  NERVOUS SYSTEM:  Alert & Oriented X3, PERRL, no facial asymmetry, tongue midline,paraplegia  CHEST/LUNG: Clear to auscultation bilaterally; No rales, rhonchi, wheezing, or rubs  HEART: Regular rate and rhythm; No murmurs, rubs, or gallops  ABDOMEN: Soft, Nontender, Nondistended; Bowel sounds present  EXTREMITIES:  2+ Peripheral Pulses, No clubbing, cyanosis, or edema  MUSCULOSKELETAL- no joint swelling or erythema, rt hip nontender, no ecchysmosis      acetaminophen   Tablet .. 650 milliGRAM(s) Oral every 6 hours PRN  artificial  tears Solution 1 Drop(s) Left EYE three times a day  atorvastatin 40 milliGRAM(s) Oral at bedtime  baclofen 20 milliGRAM(s) Oral two times a day  cefTRIAXone Injectable. 1000 milliGRAM(s) IV Push every 24 hours  DULoxetine 20 milliGRAM(s) Oral two times a day  gabapentin 600 milliGRAM(s) Oral three times a day  lidocaine   Patch 1 Patch Transdermal daily  metroNIDAZOLE    Tablet 500 milliGRAM(s) Oral every 8 hours  mineral oil enema 133 milliLiter(s) Rectal daily PRN  oxyCODONE    IR 10 milliGRAM(s) Oral four times a day PRN  polyethylene glycol 3350 17 Gram(s) Oral two times a day  prednisoLONE acetate 1% Suspension 1 Drop(s) Left EYE four times a day  valACYclovir 1000 milliGRAM(s) Oral daily Patient is a 68y old  Female who presents with a chief complaint of Lethargy, weakness (18 Aug 2019 11:53)    SUBJECTIVE:   BPs stable, no cp palps sob  conversant  Hb drifiting down, discussed with pt - will watch today as she is not symptomatic and tf tomorrow if still low      T(C): 36.8 (08-18-19 @ 11:12), Max: 36.8 (08-17-19 @ 20:00)  HR: 108 (08-18-19 @ 06:39) (70 - 108)  BP: 145/79 (08-18-19 @ 06:39) (89/50 - 145/79)  RR: 18 (08-18-19 @ 11:12) (16 - 18)  SpO2: 96% (08-18-19 @ 11:12) (96% - 100%)  Wt(kg): --      LABS:                        7.4    9.75  )-----------( 390      ( 18 Aug 2019 06:54 )             24.8     EKG/Tele reviewed by me:  sinus no acute st/tw changes     PHYSICAL EXAM:  GENERAL: awake, alert, oriented   HEAD:  Atraumatic, Normocephalic  EYES: EOMI, PERRLA, conjunctiva and sclera clear, left eye post-surgical, soft nontender, dry  ENMT: Moist mucous membranes  NECK: Supple, No JVD, thyroid nontender  NERVOUS SYSTEM:  Alert & Oriented X3, PERRL, no facial asymmetry, tongue midline,paraplegia  CHEST/LUNG: Clear to auscultation bilaterally; No rales, rhonchi, wheezing, or rubs  HEART: Regular rate and rhythm; No murmurs, rubs, or gallops  ABDOMEN: Soft, Nontender, Nondistended; Bowel sounds present  EXTREMITIES:  2+ Peripheral Pulses, No clubbing, cyanosis, or edema  MUSCULOSKELETAL- no joint swelling or erythema, rt hip nontender, no ecchysmosis      acetaminophen   Tablet .. 650 milliGRAM(s) Oral every 6 hours PRN  artificial  tears Solution 1 Drop(s) Left EYE three times a day  atorvastatin 40 milliGRAM(s) Oral at bedtime  baclofen 20 milliGRAM(s) Oral two times a day  cefTRIAXone Injectable. 1000 milliGRAM(s) IV Push every 24 hours  DULoxetine 20 milliGRAM(s) Oral two times a day  gabapentin 600 milliGRAM(s) Oral three times a day  lidocaine   Patch 1 Patch Transdermal daily  metroNIDAZOLE    Tablet 500 milliGRAM(s) Oral every 8 hours  mineral oil enema 133 milliLiter(s) Rectal daily PRN  oxyCODONE    IR 10 milliGRAM(s) Oral four times a day PRN  polyethylene glycol 3350 17 Gram(s) Oral two times a day  prednisoLONE acetate 1% Suspension 1 Drop(s) Left EYE four times a day  valACYclovir 1000 milliGRAM(s) Oral daily

## 2019-08-18 NOTE — PROVIDER CONTACT NOTE (CHANGE IN STATUS NOTIFICATION) - SITUATION
Upon greeting pt at shift change, pt reported nausea as she was drinking bowel prep. Dr Gibbons called and order recd for zofran. Returned to pt and she reported "feeling hot and clammy" and was pale in appearance. VS taken and BP 84/44,  on BP monitor. Radial pulse done and was 95. Called Dr Gibbons and bolus ordered. Returned to hang bolus and pt was experiencing mildy labored breathing and was minimally responsive. Rapid response called immediately.

## 2019-08-18 NOTE — PROVIDER CONTACT NOTE (CRITICAL VALUE NOTIFICATION) - SITUATION
MD Heck made immediately aware while on phone with lab. Physician ordering stat PRBC transfusion. Primary JIMBO Caban made aware immediately.
MD austin.

## 2019-08-18 NOTE — CONSULT NOTE ADULT - ASSESSMENT
Imp:  Anemia and guaiac +  Multiple comorbidities as above    Rec:  For EGD and colonoscopy prior to d/c although prep with be complicated by paraplegia
67 year old woman with the above complex past medical history admitted with severe anemia.  Work up in progress.  She has had labile BPs.  I agree with holding all BP medications for now.  Will obtain a TTE and follow up with the result.
67 year old female with PMH  Repair of Aortic Dissection 2009 than 2014, Spinal Hematoma leading to Paraplegia 2015, HTN, chronic pain with baclofen pump, neurogenic bladder/chronic UTIs- self catheterization, ? IVC filter, anemia initially admitted 8/12 with complaints of weakness to the point she was unable to stand up, here found to have wbc ct 30, LA 7.4, Cr 1.1, hgb 4.3, no overt GI bleeding, FOBT + UA grossly positive, CT abd/pelvis remarkable for 1.2 cm obstructing proximal L ureter with stone/mild to moderate hydronephrosis and underlying pyelonephritis was eval by urology s/p cystoscopy/L ureteral stent placement, urine cx grew Ecoli and intraop urine cx grew Neserria sicca/Ecoli/bacteroides she was on IV zosyn then switched to rocephin 8/15.     1. sepsis. L obstructing ureteral stone s/p stent. cystitis-pyelonephritis. neurogenic bladder  - s/p zosyn #5, on rocephin 1gm daily #2  - urine cultures reviewed   - agree with IV rocephin, add po flagyl for bacteroides coverage  - had initial blood cx growing staph hominis - this is generally considered contaminant repeat blood cx no growth  - plan for 10 day course abx  - possible stone tx next week  - GI eval noted, endoscopy soon  - monitor temps  - tolerating abx well so far; no side effects noted  - reason for abx use and side effects reviewed with patient  - supportive care  - fu cbc
Urosepsis 2/2 left ureteral stone with hydronephrosis    Plan:    -plan for emergent cystoscopy, left ureteral stent placement in OR this AM. Given patient's clinical status and AMS, she is unable to consent. However, I was able to get consent from  - Alexis Portillo - over the phone. Discussed risks of pain, infection, bleeding, need for additional procedures, inability to place stent requiring PCN, bladder/ureteral/kidney perforations, DVT/PE/MI/stroke/pneumonia, and death. Her  verbalized understanding of these risks and is willing to proceed at this time.   -continue IV antibiotics  -if stent is not possible, she will need L PCN by IR today.  and IR aware.   -d/w ICU attending    Thank you for allowing me to assist in the care of this patient  Please feel free to call with any questions/concerns    Jorge Lares MD  [facility]  W: [phone]

## 2019-08-18 NOTE — CONSULT NOTE ADULT - SUBJECTIVE AND OBJECTIVE BOX
CHIEF COMPLAINT: Patient is a 68y old  Female who presents with a chief complaint of Lethargy, weakness (17 Aug 2019 19:33)      HPI: HPI:  This patient is a 67 year old female with PMH signficiant for:                   Repair of Aortic Dissection 2009 than 2014                   Spinal Hematoma leading to Paraplegia 2015                   HTN                   chronic pain - has baclofen pump                   self catheterization with chronic UTIs                  ? ivc filter  In January patient had hgb of 10.8 but low mcv,    Today she presents with weakness over a week , so bad that today she could barely sit up.  In ED she was found to have hgb of 4.3, with dec mcv, no dark stools, no hx. GI bleeding,  ct done in ED pending  also pyuria, likely UTI  Sinus tach to 150 (12 Aug 2019 03:20)      PMHx: PAST MEDICAL & SURGICAL HISTORY:  Dorsalgia of lumbar region: on pain medication /baclofen po and pump  Self-catheterizes urinary bladder  Anemia: chronic  Uveitis  Osteoporosis  PAD (peripheral artery disease)  Hematoma: spinal  September  treated  September 2018  Paraplegia: on wheelchair goes to physical therapy 2 x weekly  Aortic dissection, thoracic: Type A Repaired 2009  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2018  UTI (urinary tract infection): stable x 3 months  TIA (transient ischemic attack)  HTN (Hypertension): on meds  History of corneal transplant: left corneal transplant on 5/21/2018  Disorder of spine: unthetethering 2 x  Presence of IVC filter: 2014 ?  S/P aortic dissection repair: Type A Dissection repair /2009   descending aortic aneurysm repair 9/2016  H/O Spinal surgery: laminectomies 2014        Soc Hx:     FAMILY HISTORY:  Family history of Alzheimer's disease      Allergies: Allergies    No Known Allergies    Intolerances          REVIEW OF SYSTEMS:    As above  No chest pain or shortness of breath  No lightheadeness or syncope  No leg swelling  No palpitations  No claudication-like symptoms    Vital Signs Last 24 Hrs  T(C): 36.8 (18 Aug 2019 11:12), Max: 36.8 (17 Aug 2019 20:00)  T(F): 98.3 (18 Aug 2019 11:12), Max: 98.3 (18 Aug 2019 11:12)  HR: 108 (18 Aug 2019 06:39) (70 - 108)  BP: 145/79 (18 Aug 2019 06:39) (74/44 - 145/79)  BP(mean): --  RR: 18 (18 Aug 2019 11:12) (16 - 18)  SpO2: 96% (18 Aug 2019 11:12) (96% - 100%)    I&O's Summary    17 Aug 2019 07:01  -  18 Aug 2019 07:00  --------------------------------------------------------  IN: 480 mL / OUT: 0 mL / NET: 480 mL    18 Aug 2019 07:01  -  18 Aug 2019 11:54  --------------------------------------------------------  IN: 480 mL / OUT: 0 mL / NET: 480 mL        CAPILLARY BLOOD GLUCOSE      POCT Blood Glucose.: 174 mg/dL (17 Aug 2019 17:50)      PHYSICAL EXAM:   Patient in NAD  Neck: No JVD; Carotids:  2+ without bruits  Respiratory:  Clear to A&P  Cardiovascular: S1 and S2, regular rate and rhythm, no Murmurs, gallops or rubs  Gastrointestinal:  Soft, non-tender; BS positive  Extremities: No peripheral edema  Vascular: 2+ peripheral pulses  Neurological: A/O x 3, no focal deficits      MEDICATIONS:  MEDICATIONS  (STANDING):  artificial  tears Solution 1 Drop(s) Left EYE three times a day  atorvastatin 40 milliGRAM(s) Oral at bedtime  baclofen 20 milliGRAM(s) Oral two times a day  cefTRIAXone Injectable. 1000 milliGRAM(s) IV Push every 24 hours  DULoxetine 20 milliGRAM(s) Oral two times a day  gabapentin 600 milliGRAM(s) Oral three times a day  lidocaine   Patch 1 Patch Transdermal daily  metroNIDAZOLE    Tablet 500 milliGRAM(s) Oral every 8 hours  polyethylene glycol 3350 17 Gram(s) Oral two times a day  polyethylene glycol/electrolyte Solution. 4000 milliLiter(s) Oral once  prednisoLONE acetate 1% Suspension 1 Drop(s) Left EYE four times a day  valACYclovir 1000 milliGRAM(s) Oral daily      LABS: All Labs Reviewed:  Blood Culture: Organism --  Gram Stain Blood -- Gram Stain --  Specimen Source .Urine Catheterized  Culture-Blood --    Organism --  Gram Stain Blood -- Gram Stain --  Specimen Source .Blood None  Culture-Blood --      BNP   CBC             WBC Count: 9.75 K/uL (08-18 @ 06:54)  WBC Count: 11.12 K/uL (08-17 @ 18:10)              Hemoglobin: 7.4 g/dL (08-18 @ 06:54)  Hemoglobin: 7.7 g/dL (08-17 @ 18:10)  Hemoglobin: 9.0 g/dL (08-17 @ 07:34)  Hemoglobin: 8.3 g/dL (08-16 @ 08:08)  Hemoglobin: 7.5 g/dL (08-15 @ 07:38)  Hemoglobin: 6.8 g/dL (08-14 @ 06:19)              Hematocrit: 24.8 % (08-18 @ 06:54)  Hematocrit: 24.9 % (08-17 @ 18:10)  Hematocrit: 30.0 % (08-17 @ 07:34)  Hematocrit: 27.1 % (08-16 @ 08:08)  Hematocrit: 24.5 % (08-15 @ 07:38)  Hematocrit: 21.9 % (08-14 @ 06:19)              Mean Cell Volume: 82.4 fl (08-18 @ 06:54)  Mean Cell Volume: 81.9 fl (08-17 @ 18:10)  Mean Cell Volume: 82.0 fl (08-17 @ 07:34)  Mean Cell Volume: 81.1 fl (08-16 @ 08:08)  Mean Cell Volume: 80.1 fl (08-15 @ 07:38)  Mean Cell Volume: 79.3 fl (08-14 @ 06:19)              Platelet Count - Automated: 390 K/uL (08-18 @ 06:54)  Platelet Count - Automated: 364 K/uL (08-17 @ 18:10)  Platelet Count - Automated: 279 K/uL (08-17 @ 07:34)  Platelet Count - Automated: 262 K/uL (08-16 @ 08:08)  Platelet Count - Automated: 191 K/uL (08-15 @ 07:38)  Platelet Count - Automated: 215 K/uL (08-14 @ 06:19)                            Cardiac markers                                        Chems        Sodium, Serum: 141 mmol/L (08-18 @ 06:54)  Sodium, Serum: 140 mmol/L (08-17 @ 07:34)  Sodium, Serum: 142 mmol/L (08-16 @ 08:08)  Sodium, Serum: 145 mmol/L (08-15 @ 07:38)  Sodium, Serum: 146 mmol/L (08-14 @ 06:19)          Potassium, Serum: 4.2 mmol/L (08-18 @ 06:54)  Potassium, Serum: 3.6 mmol/L (08-17 @ 07:34)  Potassium, Serum: 3.9 mmol/L (08-16 @ 08:08)  Potassium, Serum: 4.0 mmol/L (08-15 @ 07:38)  Potassium, Serum: 3.8 mmol/L (08-14 @ 06:19)          Blood Urea Nitrogen, Serum: 26 mg/dL (08-18 @ 06:54)  Blood Urea Nitrogen, Serum: 10 mg/dL (08-17 @ 07:34)  Blood Urea Nitrogen, Serum: 11 mg/dL (08-16 @ 08:08)  Blood Urea Nitrogen, Serum: 14 mg/dL (08-15 @ 07:38)  Blood Urea Nitrogen, Serum: 22 mg/dL (08-14 @ 06:19)          Creatinine 0.56  Creatinine 0.52  Creatinine 0.57  Creatinine 0.54  Creatinine 0.56          Magnesium, Serum: 3.1 mg/dL (08-18 @ 06:54)  Magnesium, Serum: 2.1 mg/dL (08-17 @ 07:34)  Magnesium, Serum: 2.1 mg/dL (08-16 @ 08:08)  Magnesium, Serum: 2.1 mg/dL (08-15 @ 07:38)                          Calcium, Total Serum: 8.7 mg/dL (08-18 @ 06:54)  Calcium, Total Serum: 8.9 mg/dL (08-17 @ 07:34)  Calcium, Total Serum: 8.8 mg/dL (08-16 @ 08:08)  Calcium, Total Serum: 8.7 mg/dL (08-15 @ 07:38)  Calcium, Total Serum: 8.9 mg/dL (08-14 @ 06:19)                                                            RADIOLOGY: < from: CT Angio Chest Dissection Protocol (08.12.19 @ 02:53) >  7 x 11 mm calcified stone in themid left ureter creating a moderate left   hydronephrosis and a delayed nephrogram.  Mild wall thickening and   enhancement   of the left renal pelvis and proximal ureter could indicate an underlying   infection/UTI.    < end of copied text >  < from: CT Angio Chest Dissection Protocol (08.12.19 @ 02:53) >   Aneurysmal change of the abdominal aorta measuring 4.1   cm, no rupture.      < end of copied text >  < from: CT Angio Chest Dissection Protocol (08.12.19 @ 02:53) >  Stable appearance of thesurgically repaired thoracic aorta.  Stable appearance of the abdominal aorta.    Tree-in-bud opacities throughout both lungs are more pronounced on the   current exam.    Patchy opacities and solid appearing nodules in both lungs, measuring up   to 9 mm. Some of which are new and some have increased in size in the   interim.    1.2 cm obstructing calculus in the proximal left ureter with   mild-to-moderate hydronephrosis and probable underlying pyelonephritis.   Clinical correlation is recommended.    < end of copied text >      EKG: NSR; IRBBB    Telemetry:    ECHO:
Patient is a 68y old  Female who presents with a chief complaint of Lethargy, weakness (16 Aug 2019 09:17)    HPI:  67 year old female with PMH  Repair of Aortic Dissection 2009 than 2014, Spinal Hematoma leading to Paraplegia 2015, HTN, chronic pain with baclofen pump, neurogenic bladder/chronic UTIs- self catheterization, ? IVC filter, anemia initially admitted 8/12 with complaints of weakness to the point she was unable to stand up, here found to have wbc ct 30, LA 7.4, Cr 1.1, hgb 4.3, no overt GI bleeding, FOBT + UA grossly positive, CT abd/pelvis remarkable for 1.2 cm obstructing proximal L ureter with stone/mild to moderate hydronephrosis and underlying pyelonephritis was eval by urology s/p cystoscopy/L ureteral stent placement, urine cx grew Ecoli and intraop urine cx grew Neserria sicca/Ecoli/bacteroides she was on IV zosyn then switched to rocephin 8/15.         PMH: as above  PSH: as above  Meds: per reconciliation sheet, noted below  MEDICATIONS  (STANDING):  atorvastatin 40 milliGRAM(s) Oral at bedtime  baclofen 20 milliGRAM(s) Oral two times a day  cefTRIAXone Injectable. 1000 milliGRAM(s) IV Push every 24 hours  DULoxetine 20 milliGRAM(s) Oral two times a day  gabapentin 600 milliGRAM(s) Oral three times a day  labetalol 200 milliGRAM(s) Oral every 8 hours  polyethylene glycol 3350 17 Gram(s) Oral two times a day  prednisoLONE acetate 1% Suspension 1 Drop(s) Left EYE four times a day  valACYclovir 1000 milliGRAM(s) Oral daily    Allergies    No Known Allergies    Intolerances      Social: no smoking, no alcohol, no illegal drugs; no recent travel, no exposure to TB  FAMILY HISTORY:  Family history of Alzheimer's disease     no history of premature cardiovascular disease in first degree relatives    ROS: no HA, no dizziness, no sore throat, no blurry vision, no CP, no palpitations, no SOB, no cough, no abdominal pain, no diarrhea, no N/V, nno leg pain, no claudication, no rash, no joint aches, no rectal pain or bleeding, no night sweats  All other systems reviewed and are negative    Vital Signs Last 24 Hrs  T(C): 36.7 (16 Aug 2019 05:20), Max: 36.8 (15 Aug 2019 12:07)  T(F): 98 (16 Aug 2019 05:20), Max: 98.2 (15 Aug 2019 12:07)  HR: 74 (16 Aug 2019 05:20) (74 - 89)  BP: 165/71 (16 Aug 2019 05:20) (157/61 - 165/71)  BP(mean): --  RR: 18 (16 Aug 2019 05:20) (18 - 19)  SpO2: 94% (16 Aug 2019 05:20) (94% - 96%)  Daily     Daily     PE:  Constitutional: frail looking  HEENT: NC/AT, EOMI, PERRLA, conjunctivae clear; ears and nose atraumatic; pharynx benign  Neck: supple; thyroid not palpable  Back: no tenderness  Respiratory: respiratory effort normal; clear to auscultation  Cardiovascular: S1S2 regular, no murmurs  Abdomen: soft, not tender, not distended, positive BS  Genitourinary: no suprapubic tenderness  Lymphatic: no LN palpable  Musculoskeletal: no muscle tenderness, no joint swelling or tenderness  Extremities: no pedal edema  Neurological/ Psychiatric: moving all extremities  Skin: no rashes; no palpable lesions    Labs: all available labs reviewed                        8.3    9.20  )-----------( 262      ( 16 Aug 2019 08:08 )             27.1     08-16    142  |  110<H>  |  11  ----------------------------<  93  3.9   |  28  |  0.57    Ca    8.8      16 Aug 2019 08:08  Mg     2.1     08-16       Culture - Blood (08.13.19 @ 14:12)    Specimen Source: .Blood None    Culture Results:   No growth to date.    Culture - Urine (08.12.19 @ 08:59)    -  Aztreonam: S <=4    -  Amikacin: S <=16    -  Cefepime: S <=4    -  Cefoxitin: S <=8    -  Cefazolin: R >16 (MIC_CL_COM_ENTERIC_CEFAZU) For uncomplicated UTI with K. pneumoniae, E. coli, or P. mirablis: ASHLEY <=16 is sensitive and ASHLEY >=32 is resistant. This also predicts results for oral agents cefaclor, cefdinir, cefpodoxime, cefprozil, cefuroxime axetil, cephalexin and locarbef for uncomplicated UTI. Note that some isolates may be susceptible to these agents while testing resistant to cefazolin.    -  Ceftriaxone: S <=1 Enterobacter, Citrobacter, and Serratia may develop resistance during prolonged therapy    -  Ciprofloxacin: R >2    -  Gentamicin: S <=4    -  Imipenem: S <=1    -  Levofloxacin: R >4    -  Meropenem: S <=1    -  Nitrofurantoin: S <=32 Should not be used to treat pyelonephritis    -  Trimethoprim/Sulfamethoxazole: R >2/38    -  Tobramycin: S <=4    -  Tigecycline: S <=2    -  Piperacillin/Tazobactam: R >64    -  Ampicillin: R >16 These ampicillin results predict results for amoxicillin    -  Ampicillin/Sulbactam: R >16/8 Enterobacter, Citrobacter, and Serratia may develop resistance during prolonged therapy (3-4 days)    Specimen Source: .Urine left kidney urine    Culture Results:   Few Escherichia coli  Moderate Bacteroides fragilis "Susceptibilities not performed"  Rare Neisseria sicca "Susceptibilities not performed"    Organism Identification: Escherichia coli    Organism: Escherichia coli    Method Type: ASHLEY    Culture - Urine (08.12.19 @ 00:56)    -  Ampicillin: R >16 These ampicillin results predict results for amoxicillin    -  Tigecycline: S <=2    -  Piperacillin/Tazobactam: S <=16    -  Tobramycin: S <=4    -  Trimethoprim/Sulfamethoxazole: R >2/38    -  Nitrofurantoin: S <=32 Should not be used to treat pyelonephritis    -  Meropenem: S <=1    -  Gentamicin: S <=4    -  Levofloxacin: R >4    -  Imipenem: S <=1    -  Ciprofloxacin: R >2    -  Ceftriaxone: S <=1 Enterobacter, Citrobacter, and Serratia may develop resistance during prolonged therapy    -  Cefazolin: S 16 (MIC_CL_COM_ENTERIC_CEFAZU) For uncomplicated UTI with K. pneumoniae, E. coli, or P. mirablis: ASHLEY <=16 is sensitive and ASHLEY >=32 is resistant. This also predicts results for oral agents cefaclor, cefdinir, cefpodoxime, cefprozil, cefuroxime axetil, cephalexin and locarbef for uncomplicated UTI. Note that some isolates may be susceptible to these agents while testing resistant to cefazolin.    -  Cefoxitin: S <=8    -  Cefepime: S <=4    -  Ampicillin/Sulbactam: R >16/8 Enterobacter, Citrobacter, and Serratia may develop resistance during prolonged therapy (3-4 days)    -  Amikacin: S <=16    -  Aztreonam: S <=4    Specimen Source: .Urine None    Culture Results:   >100,000 CFU/ml Escherichia coli    Organism Identification: Escherichia coli    Organism: Escherichia coli    Method Type: ASHLEY      Culture - Blood (08.12.19 @ 00:36)    Gram Stain:   Growth in anaerobic bottle: Gram Positive Cocci in Clusters    -  Ampicillin/Sulbactam: R <=8/4    -  Cefazolin: R 8    -  Erythromycin: R >4    -  Clindamycin: R 0.5 This isolate is presumed to be clindamycin resistant based on detection of inducible resistance. Clindamycin may still be effective in some patients.    -  Gentamicin: S <=1 Should not be used as monotherapy    -  Oxacillin: R >2    -  Penicillin: R >8    -  Trimethoprim/Sulfamethoxazole: R >2/38    -  Vancomycin: S 2    -  RIF- Rifampin: S <=1 Should not be used as monotherapy    -  Tetra/Doxy: S <=1    Specimen Source: .Blood None    Organism: Staphylococcus hominis    Culture Results:   Growth in anaerobic bottle: Staphylococcus hominis    Organism Identification: Staphylococcus hominis    Method Type: ASHLEY        Radiology: all available radiological tests reviewed  < from: CT Angio Abdomen and Pelvis w/ IV Cont (08.12.19 @ 02:55) >  EXAM:  CT ANGIO ABD PELVIS (W)AW IC                          EXAM:  CTA CHEST DISSECTION (W)AW IC                            PROCEDURE DATE:  08/12/2019          INTERPRETATION:  CLINICAL INFORMATION: Anemia, sepsis. History of aortic     dissection and aortic surgery.    COMPARISON: Chest CT 2/5/2018.    PROCEDURE:   CT Angiography of the Chest, Abdomen and Pelvis.   Precontrast imaging was performed through the chest followed by arterial   phase imaging of the chest, abdomen and pelvis.  Intravenous contrast: 90 ml Omnipaque 350. 10 ml discarded.  Oral contrast: None.  Sagittal and coronal reformats were performed as well as 3D (MIP)   reconstructions.    FINDINGS:    CHEST:     LUNGS AND LARGE AIRWAYS: Patent central airways. There are irregular   patchy opacities in the left lung apex, new from the previous exams.   There is also scattered areas of tree-in-bud opacity throughout both   lungs, stable from the previous exam. Some of these include a more   nodular opacities in the right upper lobe which may reflect mucoid   impaction. New pleural-based nodules posterior medially in the right   lower lobe as seen on image #54, 57/series 3 are more pronounced and new.  PLEURA: No pleural effusion.  VESSELS: Patient is status post repair of an ascending aortic aneurysm,   stable. Again noted is dissection extending into the brachiocephalic   artery and right common carotid artery, with better opacification of the   lumens compared to the previous exam. Stable appearance of the   postsurgical aortic arch and descending thoracic aorta. Marked narrowing   is again noted at the level of the celiac artery stable appearance of the   proximal abdominal aorta with anterior bulge of the aneurysm sac. These   are again likely postoperative and stable. 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  HEART: Heart size is normal. No pericardial effusion.  MEDIASTINUM AND NAWAF: Borderline enlarged mediastinal lymph nodes. No   hilar adenopathy.  CHEST WALL AND LOWER NECK: The thyroid gland demonstrates an enlarged   heterogeneous right lobe with hypodense nodules. There is no   supraclavicular or axillary lymphadenopathy.    ABDOMEN AND PELVIS:    LIVER: Heterogeneous enhancement of the liver may be related to the early   arterial phase. There is a subcentimeter hypodensity in segment IVb, too   small to characterize.  BILE DUCTS: Normal caliber.  GALLBLADDER: Within normal limits.  SPLEEN: Within normal limits.  PANCREAS: Within normal limits.  ADRENALS: Within normal limits.  KIDNEYS/URETERS: Symmetric enhancement. The left kidney is atrophic.   There is mild hydroureteronephrosis due to a large 1.2 x 0.9 cm   obstructing calculus in the proximal left ureter. There is diffuse   urothelial enhancement, which may be reactive versus represent biloma   nephritis. There is a punctate 1 mm nonobstructing calculus at the lower   pole of the left kidney.    BLADDER: Within normal limits.  REPRODUCTIVE ORGANS: Uterus and adnexa within normal limits.    BOWEL: No bowel obstruction. Distention of the abdomen with ingested   material is likely related to recent food intake. Appendix is not   definitively identified. There is a moderate to large amount of stool   throughout the colon.  PERITONEUM: No ascites.  VESSELS: The abdominal aorta as described above. An IVC filter is present.  RETROPERITONEUM/LYMPH NODES: No lymphadenopathy.    ABDOMINAL WALL: There is a generator in the right lower anterior   abdominal wall and left gluteal region.  BONES: Median sternotomy wires. Degenerative change of the thoracolumbar   spine.    IMPRESSION:     Compared to 2//2018:    Stable appearance of thesurgically repaired thoracic aorta.  Stable appearance of the abdominal aorta.    Tree-in-bud opacities throughout both lungs are more pronounced on the   current exam.    Patchy opacities and solid appearing nodules in both lungs, measuring up   to 9 mm. Some of which are new and some have increased in size in the   interim.    1.2 cm obstructing calculus in the proximal left ureter with   mild-to-moderate hydronephrosis and probable underlying pyelonephritis.   Clinical correlation is recommended.        Advanced directives addressed: full resuscitation
RRT called at 8:03 pm. Pt is 67y/o F, PMHx CAD, RBBB, and resp failure w/ intubation this admission, now unresponsive with labored breathing    Pt seen and examined at bedside; unresponsive and labored breathing. Tachy with HR in 120's, hypotensive and hypoxic at O2 Sat 83. Pt Placed on non-rebreather mask which increased O2sat to %. 1L NS bolus given. Radial pulse was present but faint. STAT EKG performed which showed sinus tachy. Pt became responsive to questioning at 8:15pm and CCU was called for pt transfer.  After transfer to the CCU and a fluid bolus started thew patient became more alert and combative.    VS:    BP 72/44  RR: 24  O2Sat: 96% RA    O2Sat: 100% Non Rebreather    In CCU  ICU Vital Signs Last 24 Hrs  T(C): 36.7 (18 Aug 2019 19:50), Max: 37 (18 Aug 2019 14:00)  T(F): 98.1 (18 Aug 2019 19:50), Max: 98.6 (18 Aug 2019 14:00)  HR: 95 (18 Aug 2019 19:50) (81 - 108)  BP: 84/44 (18 Aug 2019 19:50) (84/44 - 146/78)  RR: 19 (18 Aug 2019 19:50) (17 - 19)  SpO2: 96% (18 Aug 2019 19:50) (96% - 96%)    Lungs- clear, poor air entry/effort  Heart- RRR  Abd- non tender, bowel prep with melena in rectal tube.    Labs pending.
UROLOGY CONSULT    HPI: patient is 68y Female with multiple comorbidities who presented to ER earlier this AM with lethargy, weakness. Found to have elevated WBC and lactate. Also found to be severely anemic with unknown etiology.     Tachycardic in ER and with low grade temps    CT scan - 1.1 cm left ureteral stone with hydronephrosis    UA positive    s/p 3 units pRBCs with appropriate response in H/H. WBC rising to 30.       PMH/PSH  Calculus of ureter  Family history of Alzheimer's disease  No pertinent family history in first degree relatives  Dorsalgia of lumbar region  Self-catheterizes urinary bladder  Anemia  Uveitis  Osteoporosis  PAD (peripheral artery disease)  Hematoma  Paraplegia  Aortic dissection, abdominal  Aortic dissection, thoracic  Blindness of left eye  Chronic constipation  Subdural hematoma  UTI (urinary tract infection)  TIA (transient ischemic attack)  Aortic Dissection, Abdominal  HTN (Hypertension)  History of corneal transplant  Disorder of spine  Presence of IVC filter  S/P aortic dissection repair  H/O Spinal surgery  Aneurysm Repair, Abdominal Aortic    Family History:  Family history of Alzheimer's disease      Allergies  No Known Allergies      Medications  acetaminophen   Tablet .. 650 milliGRAM(s) Oral every 6 hours PRN  baclofen 20 milliGRAM(s) Oral three times a day  DULoxetine 20 milliGRAM(s) Oral daily  gabapentin 300 milliGRAM(s) Oral three times a day  labetalol 400 milliGRAM(s) Oral every 8 hours  piperacillin/tazobactam IVPB.. 3.375 Gram(s) IV Intermittent Once  polyethylene glycol 3350 17 Gram(s) Oral daily    ROS  unable to obtain from patient at this time    Vital Signs Last 24 Hrs  T(C): 38.1 (12 Aug 2019 06:00), Max: 39.3 (12 Aug 2019 05:00)  T(F): 100.5 (12 Aug 2019 06:00), Max: 102.7 (12 Aug 2019 05:00)  HR: 117 (12 Aug 2019 06:00) (112 - 154)  BP: 121/72 (12 Aug 2019 06:00) (96/55 - 162/82)  BP(mean): 83 (12 Aug 2019 06:00) (83 - 110)  RR: 21 (12 Aug 2019 06:00) (14 - 23)  SpO2: 95% (12 Aug 2019 06:00) (91% - 100%)    PHYSICAL EXAM:  Constitutional: NAD, lying in bed comfortably   Eyes: EOMI, vision is grossly intact  Back: no CVA tenderness bilaterally  Respiratory: no labored breathing  Cardiovascular: no peripheral edema, cyanosis, or pallor. Extremities are warm and well perfused.   Gastrointestinal: soft, non-tender, non-distended, no guarding, no rebound tenderness.   Genitourinary: Bladder non-palpable  Neurological: A&O x1    Labs:  CBC Full  -  ( 12 Aug 2019 06:36 )  WBC Count : 30.01 K/uL  Hemoglobin : 7.7 g/dL  Hematocrit : 24.6 %  Platelet Count - Automated : 272 K/uL  Mean Cell Volume : 79.6 fl  Mean Cell Hemoglobin : 24.9 pg  Mean Cell Hemoglobin Concentration : 31.3 gm/dL  Auto Neutrophil # : x  Auto Lymphocyte # : x  Auto Monocyte # : x  Auto Eosinophil # : x  Auto Basophil # : x  Auto Neutrophil % : x  Auto Lymphocyte % : x  Auto Monocyte % : x  Auto Eosinophil % : x  Auto Basophil % : x    08-12    143  |  109<H>  |  25<H>  ----------------------------<  102<H>  4.5   |  27  |  0.85    Ca    7.6<L>      12 Aug 2019 06:35  Mg     2.7     08-12    TPro  6.4  /  Alb  2.6<L>  /  TBili  0.4  /  DBili  x   /  AST  8<L>  /  ALT  9<L>  /  AlkPhos  121<H>  08-12
HPI:  This patient is a 67 year old female with PMH signficiant for:                   Repair of Aortic Dissection 2009 than 2014                   Spinal Hematoma leading to Paraplegia 2015                   HTN                   chronic pain - has baclofen pump                   self catheterization with chronic UTIs                  ? ivc filter  In January patient had hgb of 10.8 but low mcv,    Today she presents with weakness over a week , so bad that today she could barely sit up.  In ED she was found to have hgb of 4.3, with dec mcv, no dark stools, no hx. GI bleeding,  ct done in ED pending  also pyuria, likely UTI  Sinus tach to 150 (12 Aug 2019 03:20)  ---------------------  Here oted to be anemic although iron studies not informative. Found to be guaiac +. No active bleeding. Had ureteral stent for kidney stone related infection.      PAST MEDICAL & SURGICAL HISTORY:  Dorsalgia of lumbar region: on pain medication /baclofen po and pump  Self-catheterizes urinary bladder  Anemia: chronic  Uveitis  Osteoporosis  PAD (peripheral artery disease)  Hematoma: spinal  September  treated  September 2018  Paraplegia: on wheelchair goes to physical therapy 2 x weekly  Aortic dissection, thoracic: Type A Repaired 2009  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2018  UTI (urinary tract infection): stable x 3 months  TIA (transient ischemic attack)  HTN (Hypertension): on meds  History of corneal transplant: left corneal transplant on 5/21/2018  Disorder of spine: unthetethering 2 x  Presence of IVC filter: 2014 ?  S/P aortic dissection repair: Type A Dissection repair /2009   descending aortic aneurysm repair 9/2016  H/O Spinal surgery: laminectomies 2014      Home Medications:  atorvastatin 40 mg oral tablet: 1 tab(s) orally once a day (13 Aug 2019 09:42)  baclofen 20 mg oral tablet: 1 tab(s) orally 2 times a day (13 Aug 2019 09:42)  Cymbalta 20 mg oral delayed release capsule: 1 cap(s) orally 2 times a day (13 Aug 2019 09:42)  diclofenac 1% topical gel: Apply to affected area for neuralagia 4 times a day PRN (13 Aug 2019 09:42)  DULoxetine 20 mg oral delayed release capsule: 1 cap(s) orally 2 times a day (13 Aug 2019 09:42)  gabapentin 600 mg oral tablet: 1 tab(s) orally 3 times a day (13 Aug 2019 09:42)  labetalol 200 mg oral tablet: 1 tab(s) orally 2 times a day (13 Aug 2019 09:42)  Haylie 128 2% ophthalmic solution: 1 drop(s) to each affected eye , As Needed (13 Aug 2019 09:42)  oxyCODONE 10 mg oral tablet: 1 tab(s) orally every 6 hours, As Needed    **Per pt&#x27;s , pt takes up to 40 mg per day** (13 Aug 2019 09:42)  prednisoLONE acetate 1% ophthalmic suspension: 1 drop(s) to each affected eye 2 times a day, As Needed (13 Aug 2019 09:42)  Valtrex 1 g oral tablet: 1 tab(s) orally once a day (13 Aug 2019 09:42)  Vascepa 1 g oral capsule: 2 cap(s) orally 2 times a day, As Needed (13 Aug 2019 09:42)  Vitamin B-100 oral tablet: 1 tab(s) orally once a day (13 Aug 2019 09:42)  Vitamin D3 1000 intl units oral tablet: 1 tab(s) orally once a day (13 Aug 2019 09:42)  zolpidem 5 mg oral tablet: 1 tab(s) orally once a day (at bedtime), As Needed (13 Aug 2019 09:42)      MEDICATIONS  (STANDING):  atorvastatin 40 milliGRAM(s) Oral at bedtime  baclofen 20 milliGRAM(s) Oral two times a day  DULoxetine 20 milliGRAM(s) Oral two times a day  gabapentin 600 milliGRAM(s) Oral three times a day  labetalol 200 milliGRAM(s) Oral every 8 hours  piperacillin/tazobactam IVPB.. 3.375 Gram(s) IV Intermittent every 8 hours  polyethylene glycol 3350 17 Gram(s) Oral daily  prednisoLONE acetate 1% Suspension 1 Drop(s) Left EYE four times a day  valACYclovir 1000 milliGRAM(s) Oral daily    MEDICATIONS  (PRN):  acetaminophen   Tablet .. 650 milliGRAM(s) Oral every 6 hours PRN Temp greater or equal to 38C (100.4F)  mineral oil enema 133 milliLiter(s) Rectal daily PRN Constipation  oxyCODONE    IR 10 milliGRAM(s) Oral four times a day PRN Moderate Pain (4 - 6)      Allergies    No Known Allergies    Intolerances        SOCIAL HISTORY:    FAMILY HISTORY:  Family history of Alzheimer's disease      ROS  As above  Otherwise unremarkable    Vital Signs Last 24 Hrs  T(C): 36.6 (13 Aug 2019 10:00), Max: 37.5 (12 Aug 2019 18:00)  T(F): 97.8 (13 Aug 2019 10:00), Max: 99.5 (12 Aug 2019 18:00)  HR: 81 (13 Aug 2019 11:00) (68 - 107)  BP: 88/40 (13 Aug 2019 11:00) (88/40 - 169/75)  BP(mean): 52 (13 Aug 2019 11:00) (52 - 98)  RR: 15 (13 Aug 2019 11:00) (12 - 20)  SpO2: 93% (13 Aug 2019 11:00) (93% - 100%)    Constitutional: NAD, well-developed  Respiratory: CTAB  Cardiovascular: S1 and S2, RRR  Gastrointestinal: BS+, soft, NT/ND  Extremities: No peripheral edema  Psychiatric: Normal mood, normal affect  Skin: No rashes    LABS:                        7.1    16.73 )-----------( 215      ( 13 Aug 2019 06:24 )             22.4     08-13    147<H>  |  114<H>  |  27<H>  ----------------------------<  121<H>  4.0   |  29  |  0.62    Ca    8.4<L>      13 Aug 2019 06:24  Phos  2.8     08-13  Mg     2.1     08-13    TPro  6.4  /  Alb  2.6<L>  /  TBili  0.4  /  DBili  x   /  AST  8<L>  /  ALT  9<L>  /  AlkPhos  121<H>  08-12    PT/INR - ( 12 Aug 2019 00:36 )   PT: 13.3 sec;   INR: 1.19 ratio         PTT - ( 12 Aug 2019 00:36 )  PTT:28.4 sec  LIVER FUNCTIONS - ( 12 Aug 2019 00:36 )  Alb: 2.6 g/dL / Pro: 6.4 gm/dL / ALK PHOS: 121 U/L / ALT: 9 U/L / AST: 8 U/L / GGT: x             RADIOLOGY & ADDITIONAL STUDIES:

## 2019-08-18 NOTE — CONSULT NOTE ADULT - CONSULT REASON
RRT for hypotension and altered mentation.
left ureteral stone with hydronephrosis, urosepsis
uti/pyelo/obstructive uropathy/sepsis
Anemia/G+
Labile BPS; history of aortic dissection

## 2019-08-19 NOTE — PROGRESS NOTE ADULT - SUBJECTIVE AND OBJECTIVE BOX
Date of service: 08-19-19 @ 13:14    pt seen and examined  weak, hgb low, no overt bleeding  receiving transfusion  endoscopy today    ROS: no fever or chills; denies dizziness, no HA, no SOB or cough, no abdominal pain, no diarrhea or constipation; no legs pain, no rashes    MEDICATIONS  (STANDING):  artificial  tears Solution 1 Drop(s) Left EYE three times a day  atorvastatin 40 milliGRAM(s) Oral at bedtime  baclofen 20 milliGRAM(s) Oral two times a day  cefTRIAXone Injectable. 1000 milliGRAM(s) IV Push every 24 hours  chlorhexidine 0.12% Liquid 15 milliLiter(s) Oral Mucosa every 12 hours  DULoxetine 20 milliGRAM(s) Oral two times a day  gabapentin 600 milliGRAM(s) Oral three times a day  lidocaine   Patch 1 Patch Transdermal daily  metoclopramide Injectable 10 milliGRAM(s) IV Push every 6 hours  metroNIDAZOLE    Tablet 500 milliGRAM(s) Oral every 8 hours  pantoprazole Infusion 8 mG/Hr (10 mL/Hr) IV Continuous <Continuous>  polyethylene glycol 3350 17 Gram(s) Oral two times a day  prednisoLONE acetate 1% Suspension 1 Drop(s) Left EYE four times a day  propofol Infusion 5 MICROgram(s)/kG/Min (1.905 mL/Hr) IV Continuous <Continuous>  valACYclovir 1000 milliGRAM(s) Oral daily      Vital Signs Last 24 Hrs  T(C): 36.3 (19 Aug 2019 14:30), Max: 37 (19 Aug 2019 05:29)  T(F): 97.3 (19 Aug 2019 14:30), Max: 98.6 (19 Aug 2019 05:29)  HR: 123 (19 Aug 2019 15:01) (78 - 144)  BP: 139/78 (19 Aug 2019 14:30) (50/35 - 171/77)  BP(mean): 93 (19 Aug 2019 14:30) (38 - 114)  RR: 17 (19 Aug 2019 14:30) (12 - 34)  SpO2: 100% (19 Aug 2019 15:01) (79% - 100%)      PE:  Constitutional: frail looking  HEENT: NC/AT, EOMI, PERRLA, conjunctivae clear; ears and nose atraumatic; pharynx benign  Neck: supple; thyroid not palpable  Back: no tenderness  Respiratory: respiratory effort normal; clear to auscultation  Cardiovascular: S1S2 regular, no murmurs  Abdomen: soft, not tender, not distended, positive BS  Genitourinary: no suprapubic tenderness  Lymphatic: no LN palpable  Musculoskeletal: no muscle tenderness, no joint swelling or tenderness  Extremities: no pedal edema  Neurological/ Psychiatric: moving all extremities  Skin: no rashes; no palpable lesions    Labs: all available labs reviewed                                   7.0    13.56 )-----------( 275      ( 19 Aug 2019 11:54 )             22.8     08-19    144  |  112<H>  |  27<H>  ----------------------------<  92  3.3<L>   |  24  |  0.44<L>    Ca    7.7<L>      19 Aug 2019 06:26  Phos  4.8     08-18  Mg     2.2     08-19             Culture - Blood (08.13.19 @ 14:12)    Specimen Source: .Blood None    Culture Results:   No growth to date.    Culture - Urine (08.12.19 @ 08:59)    -  Aztreonam: S <=4    -  Amikacin: S <=16    -  Cefepime: S <=4    -  Cefoxitin: S <=8    -  Cefazolin: R >16 (MIC_CL_COM_ENTERIC_CEFAZU) For uncomplicated UTI with K. pneumoniae, E. coli, or P. mirablis: ASHLEY <=16 is sensitive and ASHLEY >=32 is resistant. This also predicts results for oral agents cefaclor, cefdinir, cefpodoxime, cefprozil, cefuroxime axetil, cephalexin and locarbef for uncomplicated UTI. Note that some isolates may be susceptible to these agents while testing resistant to cefazolin.    -  Ceftriaxone: S <=1 Enterobacter, Citrobacter, and Serratia may develop resistance during prolonged therapy    -  Ciprofloxacin: R >2    -  Gentamicin: S <=4    -  Imipenem: S <=1    -  Levofloxacin: R >4    -  Meropenem: S <=1    -  Nitrofurantoin: S <=32 Should not be used to treat pyelonephritis    -  Trimethoprim/Sulfamethoxazole: R >2/38    -  Tobramycin: S <=4    -  Tigecycline: S <=2    -  Piperacillin/Tazobactam: R >64    -  Ampicillin: R >16 These ampicillin results predict results for amoxicillin    -  Ampicillin/Sulbactam: R >16/8 Enterobacter, Citrobacter, and Serratia may develop resistance during prolonged therapy (3-4 days)    Specimen Source: .Urine left kidney urine    Culture Results:   Few Escherichia coli  Moderate Bacteroides fragilis "Susceptibilities not performed"  Rare Neisseria sicca "Susceptibilities not performed"    Organism Identification: Escherichia coli    Organism: Escherichia coli    Method Type: ASHLEY    Culture - Urine (08.12.19 @ 00:56)    -  Ampicillin: R >16 These ampicillin results predict results for amoxicillin    -  Tigecycline: S <=2    -  Piperacillin/Tazobactam: S <=16    -  Tobramycin: S <=4    -  Trimethoprim/Sulfamethoxazole: R >2/38    -  Nitrofurantoin: S <=32 Should not be used to treat pyelonephritis    -  Meropenem: S <=1    -  Gentamicin: S <=4    -  Levofloxacin: R >4    -  Imipenem: S <=1    -  Ciprofloxacin: R >2    -  Ceftriaxone: S <=1 Enterobacter, Citrobacter, and Serratia may develop resistance during prolonged therapy    -  Cefazolin: S 16 (MIC_CL_COM_ENTERIC_CEFAZU) For uncomplicated UTI with K. pneumoniae, E. coli, or P. mirablis: ASHLEY <=16 is sensitive and ASHLEY >=32 is resistant. This also predicts results for oral agents cefaclor, cefdinir, cefpodoxime, cefprozil, cefuroxime axetil, cephalexin and locarbef for uncomplicated UTI. Note that some isolates may be susceptible to these agents while testing resistant to cefazolin.    -  Cefoxitin: S <=8    -  Cefepime: S <=4    -  Ampicillin/Sulbactam: R >16/8 Enterobacter, Citrobacter, and Serratia may develop resistance during prolonged therapy (3-4 days)    -  Amikacin: S <=16    -  Aztreonam: S <=4    Specimen Source: .Urine None    Culture Results:   >100,000 CFU/ml Escherichia coli    Organism Identification: Escherichia coli    Organism: Escherichia coli    Method Type: ASHLEY      Culture - Blood (08.12.19 @ 00:36)    Gram Stain:   Growth in anaerobic bottle: Gram Positive Cocci in Clusters    -  Ampicillin/Sulbactam: R <=8/4    -  Cefazolin: R 8    -  Erythromycin: R >4    -  Clindamycin: R 0.5 This isolate is presumed to be clindamycin resistant based on detection of inducible resistance. Clindamycin may still be effective in some patients.    -  Gentamicin: S <=1 Should not be used as monotherapy    -  Oxacillin: R >2    -  Penicillin: R >8    -  Trimethoprim/Sulfamethoxazole: R >2/38    -  Vancomycin: S 2    -  RIF- Rifampin: S <=1 Should not be used as monotherapy    -  Tetra/Doxy: S <=1    Specimen Source: .Blood None    Organism: Staphylococcus hominis    Culture Results:   Growth in anaerobic bottle: Staphylococcus hominis    Organism Identification: Staphylococcus hominis    Method Type: ASHLEY        Radiology: all available radiological tests reviewed  < from: CT Angio Abdomen and Pelvis w/ IV Cont (08.12.19 @ 02:55) >  EXAM:  CT ANGIO ABD PELVIS (W)AW IC                          EXAM:  CTA CHEST DISSECTION (W)AW IC                            PROCEDURE DATE:  08/12/2019          INTERPRETATION:  CLINICAL INFORMATION: Anemia, sepsis. History of aortic     dissection and aortic surgery.    COMPARISON: Chest CT 2/5/2018.    PROCEDURE:   CT Angiography of the Chest, Abdomen and Pelvis.   Precontrast imaging was performed through the chest followed by arterial   phase imaging of the chest, abdomen and pelvis.  Intravenous contrast: 90 ml Omnipaque 350. 10 ml discarded.  Oral contrast: None.  Sagittal and coronal reformats were performed as well as 3D (MIP)   reconstructions.    FINDINGS:    CHEST:     LUNGS AND LARGE AIRWAYS: Patent central airways. There are irregular   patchy opacities in the left lung apex, new from the previous exams.   There is also scattered areas of tree-in-bud opacity throughout both   lungs, stable from the previous exam. Some of these include a more   nodular opacities in the right upper lobe which may reflect mucoid   impaction. New pleural-based nodules posterior medially in the right   lower lobe as seen on image #54, 57/series 3 are more pronounced and new.  PLEURA: No pleural effusion.  VESSELS: Patient is status post repair of an ascending aortic aneurysm,   stable. Again noted is dissection extending into the brachiocephalic   artery and right common carotid artery, with better opacification of the   lumens compared to the previous exam. Stable appearance of the   postsurgical aortic arch and descending thoracic aorta. Marked narrowing   is again noted at the level of the celiac artery stable appearance of the   proximal abdominal aorta with anterior bulge of the aneurysm sac. These   are again likely postoperative and stable. 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  HEART: Heart size is normal. No pericardial effusion.  MEDIASTINUM AND NAWAF: Borderline enlarged mediastinal lymph nodes. No   hilar adenopathy.  CHEST WALL AND LOWER NECK: The thyroid gland demonstrates an enlarged   heterogeneous right lobe with hypodense nodules. There is no   supraclavicular or axillary lymphadenopathy.    ABDOMEN AND PELVIS:    LIVER: Heterogeneous enhancement of the liver may be related to the early   arterial phase. There is a subcentimeter hypodensity in segment IVb, too   small to characterize.  BILE DUCTS: Normal caliber.  GALLBLADDER: Within normal limits.  SPLEEN: Within normal limits.  PANCREAS: Within normal limits.  ADRENALS: Within normal limits.  KIDNEYS/URETERS: Symmetric enhancement. The left kidney is atrophic.   There is mild hydroureteronephrosis due to a large 1.2 x 0.9 cm   obstructing calculus in the proximal left ureter. There is diffuse   urothelial enhancement, which may be reactive versus represent biloma   nephritis. There is a punctate 1 mm nonobstructing calculus at the lower   pole of the left kidney.    BLADDER: Within normal limits.  REPRODUCTIVE ORGANS: Uterus and adnexa within normal limits.    BOWEL: No bowel obstruction. Distention of the abdomen with ingested   material is likely related to recent food intake. Appendix is not   definitively identified. There is a moderate to large amount of stool   throughout the colon.  PERITONEUM: No ascites.  VESSELS: The abdominal aorta as described above. An IVC filter is present.  RETROPERITONEUM/LYMPH NODES: No lymphadenopathy.    ABDOMINAL WALL: There is a generator in the right lower anterior   abdominal wall and left gluteal region.  BONES: Median sternotomy wires. Degenerative change of the thoracolumbar   spine.    IMPRESSION:     Compared to 2//2018:    Stable appearance of thesurgically repaired thoracic aorta.  Stable appearance of the abdominal aorta.    Tree-in-bud opacities throughout both lungs are more pronounced on the   current exam.    Patchy opacities and solid appearing nodules in both lungs, measuring up   to 9 mm. Some of which are new and some have increased in size in the   interim.    1.2 cm obstructing calculus in the proximal left ureter with   mild-to-moderate hydronephrosis and probable underlying pyelonephritis.   Clinical correlation is recommended.        Advanced directives addressed: full resuscitation

## 2019-08-19 NOTE — PROGRESS NOTE ADULT - SUBJECTIVE AND OBJECTIVE BOX
Patient had endoscopy by GI  large clots in stomach,  could not adequately visuallize   not actively bleeding  will leave intubated  repeat endoscopy tomorrow

## 2019-08-19 NOTE — PROGRESS NOTE ADULT - ASSESSMENT
67y/o female admitted with severe acute on chronic anemia/sepsis noted to have hydronephrosis, urethral stone s/p cystoscopy and stent placement.  Events noted overnight and is currently stable at this time.     Imp:  Acute on chronic anemia and guaiac +, slow bleed?   Multiple comorbidities as above.    Plan:  Currently stable.   IV ABX.   Trend H/H, transfuse as needed.   Did not tolerate Golytely overnight, and became tachycardiac and vomited.   Will plan for EGD/colonoscopy on CCU today-discussed with endo.    Give mag citrate now, and tap water enema around 12-1pm today.   Will need medical clearance prior to procedure.      Discussed with pt, ICU, endo, Dr. Yoo.

## 2019-08-19 NOTE — PROGRESS NOTE ADULT - SUBJECTIVE AND OBJECTIVE BOX
ICU Vital Signs Last 24 Hrs  T(C): 36.9 (19 Aug 2019 03:42), Max: 37 (18 Aug 2019 14:00)  T(F): 98.4 (19 Aug 2019 03:42), Max: 98.6 (18 Aug 2019 14:00)  HR: 86 (19 Aug 2019 03:42) (81 - 137)  BP: 132/56 (19 Aug 2019 03:42) (76/47 - 146/78)  BP(mean): 68 (19 Aug 2019 01:00) (52 - 77)  RR: 13 (19 Aug 2019 03:42) (12 - 30)  SpO2: 100% (19 Aug 2019 03:42) (95% - 100%)      < from: CT Angio Chest Dissection Protocol (08.18.19 @ 23:06) >  IMPRESSION:   1. Stable chronic postsurgical changes of the proximal abdominal aorta   related   to dissection.No rupture. No acute aortic syndrome.   2. Left ureteral stent present. Mild left hydronephrosis. 1.1 cm   calcification   abutting the posterior aspect of the left ureteral stent is presumably a   ureteral stone.   3. Fluid throughout the lumen of the small and large bowel without   abnormal   dilatation or transition point which could be result of gastroenteritis,   ileus,   or malabsorption.    < end of copied text >

## 2019-08-19 NOTE — PROGRESS NOTE ADULT - SUBJECTIVE AND OBJECTIVE BOX
Patient is a 68y old  Female who presents with a chief complaint of Lethargy, weakness (19 Aug 2019 15:38)    SUBJECTIVE:   seen by me this morning after echo  she denied complaints, s/p PRBCs    T(C): 36.8 (08-19-19 @ 18:28), Max: 37 (08-19-19 @ 05:29)  HR: 110 (08-19-19 @ 19:00) (78 - 144)    LABS:                        9.4    15.96 )-----------( 153      ( 19 Aug 2019 17:30 )             29.0       < from: CT Angio Abdomen and Pelvis w/ IV Cont (08.18.19 @ 23:06) >  IMPRESSION:     1. Stable chronic postsurgical changes of the proximal abdominal aorta   related   to dissection. No rupture. No acute aortic syndrome.   2. Left ureteral stent present. Mild left hydronephrosis. 1.1 cm   calcification   abutting the posterior aspect of the left ureteral stent is presumably a   ureteral stone.   3. Fluid throughout the lumen of the small and large bowel without   abnormal   dilatation or transition point which could be result of gastroenteritis,   ileus,   or malabsorption.    < end of copied text >      PHYSICAL EXAM:  GENERAL: awake, alert, oriented   HEAD:  Atraumatic, Normocephalic  EYES: EOMI, PERRLA, conjunctiva and sclera clear, left eye post-surgical, soft nontender, dry  ENMT: Moist mucous membranes  NECK: Supple, No JVD, thyroid nontender  NERVOUS SYSTEM:  Alert & Oriented X3, PERRL, no facial asymmetry, tongue midline,paraplegia  CHEST/LUNG: Clear to auscultation bilaterally; No rales, rhonchi, wheezing, or rubs  HEART: Regular rate and rhythm; No murmurs, rubs, or gallops  ABDOMEN: Soft, Nontender, Nondistended; Bowel sounds present  EXTREMITIES:  2+ Peripheral Pulses, No clubbing, cyanosis, or edema  MUSCULOSKELETAL- no joint swelling or erythema, rt hip nontender, no ecchysmosis      acetaminophen   Tablet .. 650 milliGRAM(s) Oral every 6 hours PRN  artificial  tears Solution 1 Drop(s) Left EYE three times a day  atorvastatin 40 milliGRAM(s) Oral at bedtime  baclofen 20 milliGRAM(s) Oral two times a day  cefTRIAXone Injectable. 1000 milliGRAM(s) IV Push every 24 hours  DULoxetine 20 milliGRAM(s) Oral two times a day  gabapentin 600 milliGRAM(s) Oral three times a day  lidocaine   Patch 1 Patch Transdermal daily  metroNIDAZOLE    Tablet 500 milliGRAM(s) Oral every 8 hours  mineral oil enema 133 milliLiter(s) Rectal daily PRN  oxyCODONE    IR 10 milliGRAM(s) Oral four times a day PRN  polyethylene glycol 3350 17 Gram(s) Oral two times a day  prednisoLONE acetate 1% Suspension 1 Drop(s) Left EYE four times a day  valACYclovir 1000 milliGRAM(s) Oral daily

## 2019-08-19 NOTE — PROGRESS NOTE ADULT - SUBJECTIVE AND OBJECTIVE BOX
Patient is a 68y old  Female who presents with a chief complaint of Lethargy, weakness (19 Aug 2019 03:52)    HPI:  This patient is a 67 year old female with PMH signficiant for:                   Repair of Aortic Dissection 2009 than 2014                   Spinal Hematoma leading to Paraplegia 2015                   HTN                   chronic pain - has baclofen pump                   self catheterization with chronic UTIs                  ? ivc filter  In January patient had hgb of 10.8 but low mcv,    Today she presents with weakness over a week , so bad that today she could barely sit up.  In ED she was found to have hgb of 4.3, with dec mcv, no dark stools, no hx. GI bleeding,  ct done in ED pending  also pyuria, likely UTI  Sinus tach to 150 (12 Aug 2019 03:20)    8/19/2019-  Over nights events noted.   Pt stable at this time.   Did not tolerate Golytely overnight, x1 episode of emesis.    Still brown stools with rectal tube in place.   No abdominal pain, n/v.     PAST MEDICAL & SURGICAL HISTORY:  Dorsalgia of lumbar region: on pain medication /baclofen po and pump  Self-catheterizes urinary bladder  Anemia: chronic  Uveitis  Osteoporosis  PAD (peripheral artery disease)  Hematoma: spinal  September  treated  September 2018  Paraplegia: on wheelchair goes to physical therapy 2 x weekly  Aortic dissection, thoracic: Type A Repaired 2009  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2018  UTI (urinary tract infection): stable x 3 months  TIA (transient ischemic attack)  HTN (Hypertension): on meds  History of corneal transplant: left corneal transplant on 5/21/2018  Disorder of spine: unthetethering 2 x  Presence of IVC filter: 2014 ?  S/P aortic dissection repair: Type A Dissection repair /2009   descending aortic aneurysm repair 9/2016  H/O Spinal surgery: laminectomies 2014    MEDICATIONS  (STANDING):  artificial  tears Solution 1 Drop(s) Left EYE three times a day  atorvastatin 40 milliGRAM(s) Oral at bedtime  baclofen 20 milliGRAM(s) Oral two times a day  cefTRIAXone Injectable. 1000 milliGRAM(s) IV Push every 24 hours  DULoxetine 20 milliGRAM(s) Oral two times a day  gabapentin 600 milliGRAM(s) Oral three times a day  labetalol 200 milliGRAM(s) Oral every 8 hours  lidocaine   Patch 1 Patch Transdermal daily  magnesium citrate Oral Solution 1 Bottle Oral once  metroNIDAZOLE    Tablet 500 milliGRAM(s) Oral every 8 hours  polyethylene glycol 3350 17 Gram(s) Oral two times a day  potassium chloride  10 mEq/100 mL IVPB 10 milliEquivalent(s) IV Intermittent every 1 hour  prednisoLONE acetate 1% Suspension 1 Drop(s) Left EYE four times a day  sodium chloride 0.9% Bolus 1000 milliLiter(s) IV Bolus once  valACYclovir 1000 milliGRAM(s) Oral daily    MEDICATIONS  (PRN):  acetaminophen   Tablet .. 650 milliGRAM(s) Oral every 6 hours PRN Temp greater or equal to 38C (100.4F); MILD PAIN (1-3)  mineral oil enema 133 milliLiter(s) Rectal daily PRN Constipation  oxyCODONE    IR 10 milliGRAM(s) Oral four times a day PRN Moderate Pain (4 - 6)    Allergies  No Known Allergies    FAMILY HISTORY:  Family history of Alzheimer's disease    REVIEW OF SYSTEMS:  CONSTITUTIONAL: + weakness.  No fevers or chills  RESPIRATORY: No cough, wheezing, hemoptysis; No shortness of breath  CARDIOVASCULAR: No chest pain or palpitations  GASTROINTESTINAL: Per HPI.    Vital Signs Last 24 Hrs  T(C): 37 (19 Aug 2019 05:49), Max: 37 (18 Aug 2019 14:00)  T(F): 98.6 (19 Aug 2019 05:49), Max: 98.6 (18 Aug 2019 14:00)  HR: 84 (19 Aug 2019 08:00) (81 - 144)  BP: 139/63 (19 Aug 2019 08:00) (76/47 - 171/77)  BP(mean): 82 (19 Aug 2019 08:00) (52 - 114)  RR: 14 (19 Aug 2019 08:00) (12 - 30)  SpO2: 100% (19 Aug 2019 08:00) (95% - 100%)    PHYSICAL EXAM:  Constitutional: Middle aged female in NAD, appears comfortable in bed, paraplegic   Respiratory: CTAB  Cardiovascular: S1 and S2, RRR, no M/G/R  Gastrointestinal: BS+, soft, NT/ND, rectal tube in place.     LABS:                        7.8    14.74 )-----------( 274      ( 19 Aug 2019 06:26 )             24.4     08-19    144  |  112<H>  |  27<H>  ----------------------------<  92  3.3<L>   |  24  |  0.44<L>    Ca    7.7<L>      19 Aug 2019 06:26  Phos  4.8     08-18  Mg     2.2     08-19            RADIOLOGY & ADDITIONAL STUDIES:

## 2019-08-19 NOTE — CHART NOTE - NSCHARTNOTEFT_GEN_A_CORE
Pt under went EGD this PM  Stomach full of dark blood so procedure aborted while pt intubated  large clot in proximal stomach without active bleeding seen  plan to repeat egd in AM  ctonie protinix drip, npo, ng tube, serial h/h  d/w dr davenport and family in detail

## 2019-08-19 NOTE — PROGRESS NOTE ADULT - SUBJECTIVE AND OBJECTIVE BOX
PCP    Patient is a 68y old  Female who presents with a chief complaint of Lethargy, weakness (19 Aug 2019 15:38)    SUBJECTIVE:   Events of the day noted - pt seen by me in the morning, was conversant, denied any complaints      T(C): 36.8 (08-19-19 @ 18:28), Max: 37 (08-19-19 @ 05:29)        LABS:                        9.4    15.96 )-----------( 153      ( 19 Aug 2019 17:30 )             29.0     IMAGING reviewed by me:    EKG/Tele reviewed by me:      PHYSICAL EXAM:  GENERAL: awake, alert, oriented   HEAD:  Atraumatic, Normocephalic  EYES: EOMI, PERRLA, conjunctiva and sclera clear  ENMT: Moist mucous membranes  NECK: Supple, No JVD, thyroid nontender  NERVOUS SYSTEM:  Alert & Oriented X3, PERRL, no facial asymmetry, tongue midline, moving all extremities, no focal motor or sensory deficits  CHEST/LUNG: Clear to auscultation bilaterally; No rales, rhonchi, wheezing, or rubs  HEART: Regular rate and rhythm; No murmurs, rubs, or gallops  ABDOMEN: Soft, Nontender, Nondistended; Bowel sounds present  EXTREMITIES:  2+ Peripheral Pulses, No clubbing, cyanosis, or edema  MUSCULOSKELTAL- no joint swelling or erythema  SKIN-no rash, no lesion  CATHETERS AND LINES:      acetaminophen   Tablet .. 650 milliGRAM(s) Oral every 6 hours PRN  artificial  tears Solution 1 Drop(s) Left EYE three times a day  atorvastatin 40 milliGRAM(s) Oral at bedtime  baclofen 20 milliGRAM(s) Oral two times a day  cefTRIAXone Injectable. 1000 milliGRAM(s) IV Push every 24 hours  chlorhexidine 0.12% Liquid 15 milliLiter(s) Oral Mucosa every 12 hours  DULoxetine 20 milliGRAM(s) Oral two times a day  gabapentin 600 milliGRAM(s) Oral three times a day  lidocaine   Patch 1 Patch Transdermal daily  metoclopramide Injectable 10 milliGRAM(s) IV Push every 6 hours  metroNIDAZOLE  IVPB 500 milliGRAM(s) IV Intermittent every 8 hours  mineral oil enema 133 milliLiter(s) Rectal daily PRN  oxyCODONE    IR 10 milliGRAM(s) Oral four times a day PRN  pantoprazole Infusion 8 mG/Hr IV Continuous <Continuous>  polyethylene glycol 3350 17 Gram(s) Oral two times a day  prednisoLONE acetate 1% Suspension 1 Drop(s) Left EYE four times a day  propofol Infusion 5 MICROgram(s)/kG/Min IV Continuous <Continuous>  valACYclovir 1000 milliGRAM(s) Oral daily Patient is a 68y old  Female who presents with a chief complaint of Lethargy, weakness (19 Aug 2019 15:38)    SUBJECTIVE:   Events of the day noted - pt seen by me in the morning, was conversant, denied any complaints  later was hypotensive, intubated, had EGD, with plans to repeat EGD in AM.    T(C): 36.8 (08-19-19 @ 18:28), Max: 37 (08-19-19 @ 05:29)  LABS:                        9.4    15.96 )-----------( 153      ( 19 Aug 2019 17:30 )             29.0     IMAGING reviewed by me:    < from: CT Angio Abdomen and Pelvis w/ IV Cont (08.18.19 @ 23:06) >  1. Stable chronic postsurgical changes of the proximal abdominal aorta   related   to dissection. No rupture. No acute aortic syndrome.   2. Left ureteral stent present. Mild left hydronephrosis. 1.1 cm   calcification   abutting the posterior aspect of the left ureteral stent is presumably a   ureteral stone.   3. Fluid throughout the lumen of the small and large bowel without   abnormal   dilatation or transition point which could be result of gastroenteritis,   ileus,   or malabsorption.    < end of copied text >    PHYSICAL EXAM at 9AM:        acetaminophen   Tablet .. 650 milliGRAM(s) Oral every 6 hours PRN  artificial  tears Solution 1 Drop(s) Left EYE three times a day  atorvastatin 40 milliGRAM(s) Oral at bedtime  baclofen 20 milliGRAM(s) Oral two times a day  cefTRIAXone Injectable. 1000 milliGRAM(s) IV Push every 24 hours  chlorhexidine 0.12% Liquid 15 milliLiter(s) Oral Mucosa every 12 hours  DULoxetine 20 milliGRAM(s) Oral two times a day  gabapentin 600 milliGRAM(s) Oral three times a day  lidocaine   Patch 1 Patch Transdermal daily  metoclopramide Injectable 10 milliGRAM(s) IV Push every 6 hours  metroNIDAZOLE  IVPB 500 milliGRAM(s) IV Intermittent every 8 hours  mineral oil enema 133 milliLiter(s) Rectal daily PRN  oxyCODONE    IR 10 milliGRAM(s) Oral four times a day PRN  pantoprazole Infusion 8 mG/Hr IV Continuous <Continuous>  polyethylene glycol 3350 17 Gram(s) Oral two times a day  prednisoLONE acetate 1% Suspension 1 Drop(s) Left EYE four times a day  propofol Infusion 5 MICROgram(s)/kG/Min IV Continuous <Continuous>  valACYclovir 1000 milliGRAM(s) Oral daily

## 2019-08-19 NOTE — PROGRESS NOTE ADULT - ASSESSMENT
A/P  Acute Toxic Metabolic Encephalopathy - multifactorial, due to   Sepsis 2/2 UTI and Obstr Nephropathy  Severe symptomatic anemia 2/2 GI blood loss  Acute post op resp failure--extubated immediate post op   Type 2 ACS from sepsis   Other - h/o AA dissection, h/o spinal hematoma and paraplegia    Plan:  s/p cysto and left ureteral stent - further mx after egd/col/when more stable  was on pip tazo, now on rocephin/flagyl; staph hominis contaminant, seen by ID  No ASA given profound anemia-- s/p PRBCs  PO diet  DVT Px--SCD only due to profound bloodloss anemia  cont oxy baclofen gabapentin  will resume BB when BP allows  cont statin  R Hip pain - f/u imaging neg  patient and  agree to EGD/COL   dc planning - after EGD/COL, undergoing prep; reconsult Urology if any other procedures to be done as IP    HTN by history and very labile BPs, was hypotensive yesterday also, RRT was called, better after IVFs PRBCs  Card Cx  Left a msg for her PMD Basil Branham    ** called RN to check up on patient - later in the day became hypotensive, intubated, s/p bedside egd. and for rpt EGD in AM

## 2019-08-19 NOTE — PROGRESS NOTE ADULT - ASSESSMENT
Patient admitted with profound anemia, (HGB 4)  sepsis from obstructing kidney stone  hypotendion    1. Plan endoscopy Today, if adequate Prep, follow H/H  2. sepsis, obstructing kidney stone, s/p stent         Complete 10 days Rocephin, Flagyl, now afebrile       Urology to decide prior to D/c whether to address stone during this admission or at later date  3.  Will continue labetalol  4. f/u cta no evidence bleeding, no dissection  5. Hypokalemia replace Patient admitted with profound anemia, (HGB 4)  sepsis from obstructing kidney stone  hypotendion    1. Plan endoscopy Today, if adequate Prep, follow H/H     There is no medical contraindication to Endoscopy  2. sepsis, obstructing kidney stone, s/p stent         Complete 10 days Rocephin, Flagyl, now afebrile       Urology to decide prior to D/c whether to address stone during this admission or at later date  3.  Will continue labetalol  4. f/u cta no evidence bleeding, no dissection  5. Hypokalemia replace

## 2019-08-19 NOTE — PROGRESS NOTE ADULT - ASSESSMENT
67 year old female with PMH  Repair of Aortic Dissection 2009 than 2014, Spinal Hematoma leading to Paraplegia 2015, HTN, chronic pain with baclofen pump, neurogenic bladder/chronic UTIs- self catheterization, ? IVC filter, anemia initially admitted 8/12 with complaints of weakness to the point she was unable to stand up, here found to have wbc ct 30, LA 7.4, Cr 1.1, hgb 4.3, no overt GI bleeding, FOBT + UA grossly positive, CT abd/pelvis remarkable for 1.2 cm obstructing proximal L ureter with stone/mild to moderate hydronephrosis and underlying pyelonephritis was eval by urology s/p cystoscopy/L ureteral stent placement, urine cx grew Ecoli and intraop urine cx grew Neserria sicca/Ecoli/bacteroides she was on IV zosyn then switched to rocephin 8/15.     1. sepsis. L obstructing ureteral stone s/p stent. cystitis-pyelonephritis. neurogenic bladder  - afebrile, wbc ct improving, hgb low  - s/p zosyn #5, on rocephin 1gm daily #5  - on flagyl for bacteroides coverage #4  - had initial blood cx growing staph hominis - this is generally considered contaminant repeat blood cx no growth  - plan for 10 day course abx  - eventual stone treatment  - GI eval noted, endoscopy today   - monitor temps  - tolerating abx well so far; no side effects noted  - reason for abx use and side effects reviewed with patient  - supportive care  - fu cbc

## 2019-08-19 NOTE — PROGRESS NOTE ADULT - SUBJECTIVE AND OBJECTIVE BOX
Events Overnight:    was transferred to CCU yesterday for dec hgb, 6 from 9 on 8/17, and hypotension, resolved , was transfused, bp better                                was being prepped for colonscopy, feels much better this am    HPI:       67 year old female with PMH signficiant for:                   Repair of Aortic Dissection 2009 than 2014                   Spinal Hematoma leading to Paraplegia 2015                   HTN                   chronic pain - has baclofen pump                   self catheterization with chronic UTIs                  ? ivc filter  Pt admitted toon 8/12 with lethargy, fever, anemia, with Obst L renal stone, UTI sepsis and Anemia .  Had left ureteral stent placed, urine culture grew neisseria Sicca and Bacteroides  Patient was transfused  had had 2 episodes of hypotension,   stool was guaic positive, hgb dropped but no clear active bleeding and f/u ct negative.  Patient more awake feeling better this am     ROS     - feels better        no fevers       no chills       no flank pain       no sob       no chest pain       feels stronger this am       slight abdominal distention but no pain       ROS otherwise negative    MEDICATIONS  (STANDING):  artificial  tears Solution 1 Drop(s) Left EYE three times a day  atorvastatin 40 milliGRAM(s) Oral at bedtime  baclofen 20 milliGRAM(s) Oral two times a day  cefTRIAXone Injectable. 1000 milliGRAM(s) IV Push every 24 hours  DULoxetine 20 milliGRAM(s) Oral two times a day  gabapentin 600 milliGRAM(s) Oral three times a day  labetalol 200 milliGRAM(s) Oral every 8 hours  lidocaine   Patch 1 Patch Transdermal daily  magnesium citrate Oral Solution 1 Bottle Oral once  metroNIDAZOLE    Tablet 500 milliGRAM(s) Oral every 8 hours  polyethylene glycol 3350 17 Gram(s) Oral two times a day  potassium chloride  10 mEq/100 mL IVPB 10 milliEquivalent(s) IV Intermittent every 1 hour  prednisoLONE acetate 1% Suspension 1 Drop(s) Left EYE four times a day  sodium chloride 0.9% Bolus 1000 milliLiter(s) IV Bolus once  valACYclovir 1000 milliGRAM(s) Oral daily    MEDICATIONS  (PRN):  acetaminophen   Tablet .. 650 milliGRAM(s) Oral every 6 hours PRN Temp greater or equal to 38C (100.4F); MILD PAIN (1-3)  mineral oil enema 133 milliLiter(s) Rectal daily PRN Constipation  oxyCODONE    IR 10 milliGRAM(s) Oral four times a day PRN Moderate Pain (4 - 6)      ICU Vital Signs Last 24 Hrs  T(C): 37 (19 Aug 2019 05:49), Max: 37 (18 Aug 2019 14:00)  T(F): 98.6 (19 Aug 2019 05:49), Max: 98.6 (18 Aug 2019 14:00)  HR: 87 (19 Aug 2019 09:00) (81 - 144)  BP: 149/57 (19 Aug 2019 09:00) (76/47 - 171/77)  BP(mean): 80 (19 Aug 2019 09:00) (52 - 114)  ABP: --  ABP(mean): --  RR: 18 (19 Aug 2019 09:00) (12 - 30)  SpO2: 98% (19 Aug 2019 09:00) (95% - 100%)    I&O's Summary    18 Aug 2019 07:01  -  19 Aug 2019 07:00  --------------------------------------------------------  IN: 3880 mL / OUT: 700 mL / NET: 3180 mL      Physical Exam:     General - Pale, nc/at     Neuro alert, paraplegic, gcs 15, alert     HEENT - nc/at     neck no jvd     Lungs - clear     cv rrr     abdomen - slight distended non tender     ext - contracted, warm                        7.8    14.74 )-----------( 274      ( 19 Aug 2019 06:26 )             24.4     08-19    144  |  112<H>  |  27<H>  ----------------------------<  92  3.3<L>   |  24  |  0.44<L>    Ca    7.7<L>      19 Aug 2019 06:26  Phos  4.8     08-18  Mg     2.2     08-19              ABG - ( 18 Aug 2019 21:55 )  pH, Arterial: 7.32  pH, Blood: x     /  pCO2: 38    /  pO2: 189   / HCO3: 19    / Base Excess: -6.2  /  SaO2: 99          DVT Prophylaxis: Venodynes, no heparin, given GI bleeding                        Advanced Directives: Full Code

## 2019-08-19 NOTE — PROGRESS NOTE ADULT - ASSESSMENT
A/P  Acute Toxic Metabolic Encephalopathy - multifactorial, due to   Sepsis 2/2 UTI and Obstr Nephropathy  Severe symptomatic anemia 2/2 GI blood loss  Acute post op resp failure--extubated immediate post op   Type 2 ACS from sepsis   Other - h/o AA dissection, h/o spinal hematoma and paraplegia    Plan:  s/p cysto and left ureteral stent - further mx after egd/col/when more stable  was on pip tazo, now on rocephin/flagyl; staph hominis contaminant, seen by ID  GI consult with dr nunez/andera - plan for EGD/COL when stable - monday  pt has started her prep: FL after lunch and MgCItrate tonight per GI  on Sunday: CL with GoLytely at 2pm  No ASA given profound anemia-- s/p PRBCs  PO diet  DVT Px--SCD only due to profound bloodloss anemia  cont oxy baclofen gabapentin  will resume BB when BP allows  cont statin  R Hip pain - f/u imaging neg  patient and  agree to EGD/COL   dc planning - after EGD/COL, undergoing prep; reconsult Urology if any other procedures to be done as IP    HTN by history and very labile BPs, was hypotensive yesterday, RRT was called, better after IVFs will resume her labetalol today with parameters. Not on amlodipine or ACEi at this time  Card Cx  Left a msg for her PMD Basil Branham

## 2019-08-20 NOTE — PROGRESS NOTE ADULT - SUBJECTIVE AND OBJECTIVE BOX
Patient seen and examined at bedside  currently undergoing continued GI eval of UGI bleed  No  complaints  No f/c/n/v  most recent urine culture - negative    Medications  acetaminophen   Tablet .. 650 milliGRAM(s) Oral every 6 hours PRN  atorvastatin 40 milliGRAM(s) Oral at bedtime  baclofen 20 milliGRAM(s) Oral two times a day  DULoxetine 20 milliGRAM(s) Oral two times a day  gabapentin 600 milliGRAM(s) Oral three times a day  lidocaine   Patch 1 Patch Transdermal daily  metroNIDAZOLE  IVPB 500 milliGRAM(s) IV Intermittent every 8 hours  mineral oil enema 133 milliLiter(s) Rectal daily PRN  oxyCODONE    IR 10 milliGRAM(s) Oral four times a day PRN  pantoprazole Infusion 8 mG/Hr IV Continuous <Continuous>  polyethylene glycol 3350 17 Gram(s) Oral two times a day  potassium acid phosphate/sodium acid phosphate tablet (K-PHOS No. 2) 2 Tablet(s) Oral every 4 hours  prednisoLONE acetate 1% Suspension 1 Drop(s) Left EYE four times a day  sodium chloride 0.45%. 1000 milliLiter(s) IV Continuous <Continuous>  valACYclovir 1000 milliGRAM(s) Oral daily      ROS  General: No  fevers, chills, night sweats, fatigue, malaise  Ophthalmologic: No eye pain,  swelling  ENMT: No hearing changes,  ear pain,  nasal congestion  Respiratory and Thorax: No cough, sputum, dyspnea, wheezing  Cardiovascular: No chest pain, SOB, PND, dyspnea on exertion, orthopnea  Genitourinary: see above  Neurological:	 No  syncope, loss of consciousness, headache, dizziness  Psychiatric: No SI/HI/AH/VH.  Hematology/Lymphatics:	No bruising, lymphadenopathy  Endocrine: No temperature intolerance    Vital Signs Last 24 Hrs  T(C): 36.8 (20 Aug 2019 12:16), Max: 37.1 (20 Aug 2019 06:02)  T(F): 98.2 (20 Aug 2019 12:16), Max: 98.8 (20 Aug 2019 06:02)  HR: 104 (20 Aug 2019 17:00) (94 - 126)  BP: 171/82 (20 Aug 2019 17:00) (116/54 - 202/181)  BP(mean): 104 (20 Aug 2019 17:00) (69 - 185)  RR: 21 (20 Aug 2019 17:00) (12 - 22)  SpO2: 100% (20 Aug 2019 17:00) (98% - 100%)    PHYSICAL EXAM:  Constitutional:  NAD, lying in bed comfortably   Eyes: EOMI, vision is grossly intact  Neck: neck supple  Back: no CVA tenderness bilaterally  Respiratory: no labored breathing  Cardiovascular: no peripheral edema, cyanosis, or pallor. Extremities are warm and well perfused.   Gastrointestinal: soft, non-tender, non-distended, no guarding, no rebound tenderness. Bladder non-palpable  Neurological: A&O x3  Psychiatric: normal mood and affect            I/O    08-19-19 @ 07:01  -  08-20-19 @ 07:00  --------------------------------------------------------  IN: 0 mL / OUT: 545 mL / NET: -545 mL    08-20-19 @ 07:01  -  08-20-19 @ 17:39  --------------------------------------------------------  IN: 0 mL / OUT: 250 mL / NET: -250 mL        Labs:  CBC Full  -  ( 20 Aug 2019 14:09 )  WBC Count : x  Hemoglobin : 8.0 g/dL  Hematocrit : x  Platelet Count - Automated : x  Mean Cell Volume : x  Mean Cell Hemoglobin : x  Mean Cell Hemoglobin Concentration : x  Auto Neutrophil # : x  Auto Lymphocyte # : x  Auto Monocyte # : x  Auto Eosinophil # : x  Auto Basophil # : x  Auto Neutrophil % : x  Auto Lymphocyte % : x  Auto Monocyte % : x  Auto Eosinophil % : x  Auto Basophil % : x    08-20    147<H>  |  120<H>  |  32<H>  ----------------------------<  102<H>  4.6   |  21<L>  |  0.59    Ca    8.1<L>      20 Aug 2019 14:09  Phos  1.6     08-20  Mg     2.3     08-20            Radiology:

## 2019-08-20 NOTE — CHART NOTE - NSCHARTNOTEFT_GEN_A_CORE
Endoscopy Note   [facility]  Anesthesia Iniguez  preop diagnosis ugi bleed  post op diagnosis ugi bleed    Patient did well overnight  still with large clot obscuring proximal stomach  plan for extubation today and perhaps starting clears  continue pantoprazole drip  serial h/h and transfuse as needed

## 2019-08-20 NOTE — PROGRESS NOTE ADULT - ASSESSMENT
Urosepsis 2/2 left ureteral stone with hydronephrosis, neurogenic bladder  s/p cystoscopy, left ureteral stent placement 8/12    Plan:    -will tentatively plan for OR on Monday, 8/26 for left ureteroscopy, laser lithotripsy, stone extraction, stent exchanges. Discussed risks of pain, infection, bleeding, need for additional procedures, bladder/ureteral/kidney perforations, damage to adjacent structures, DVT/PE/MI/stroke/pneumonia, and death. She and her  verbalized understanding and are willing to proceed. They understand that we will only proceed if GI eval is complete by Monday as that is more pressing of an issue for her at this time.   -continue CIC q4-6h  -continue antibiotics as per ID in light of upcoming  surgery    Thank you for allowing me to assist in the care of this patient  Please feel free to call with any questions/concerns    Jorge Lares MD  [facility]  W: [phone]

## 2019-08-20 NOTE — PROGRESS NOTE ADULT - SUBJECTIVE AND OBJECTIVE BOX
Date of service: 08-20-19 @ 11:28    pt seen and examined  extubated this am, had repeat EGD  afebrile  had endoscopy 8/19 noted to have proximal stomach clot/profuse dark blood      ROS: no fever or chills; denies dizziness, no HA, no SOB or cough, no abdominal pain, no diarrhea or constipation; no legs pain, no rash    MEDICATIONS  (STANDING):  artificial  tears Solution 1 Drop(s) Left EYE three times a day  atorvastatin 40 milliGRAM(s) Oral at bedtime  baclofen 20 milliGRAM(s) Oral two times a day  cefTRIAXone Injectable. 1000 milliGRAM(s) IV Push every 24 hours  DULoxetine 20 milliGRAM(s) Oral two times a day  gabapentin 600 milliGRAM(s) Oral three times a day  lidocaine   Patch 1 Patch Transdermal daily  metoclopramide Injectable 10 milliGRAM(s) IV Push every 6 hours  metroNIDAZOLE  IVPB 500 milliGRAM(s) IV Intermittent every 8 hours  pantoprazole Infusion 8 mG/Hr (10 mL/Hr) IV Continuous <Continuous>  polyethylene glycol 3350 17 Gram(s) Oral two times a day  prednisoLONE acetate 1% Suspension 1 Drop(s) Left EYE four times a day  sodium chloride 0.45%. 1000 milliLiter(s) (150 mL/Hr) IV Continuous <Continuous>  valACYclovir 1000 milliGRAM(s) Oral daily      Vital Signs Last 24 Hrs  T(C): 36.4 (20 Aug 2019 08:00), Max: 37.1 (20 Aug 2019 06:02)  T(F): 97.6 (20 Aug 2019 08:00), Max: 98.8 (20 Aug 2019 06:02)  HR: 101 (20 Aug 2019 11:00) (78 - 130)  BP: 160/74 (20 Aug 2019 11:00) (50/35 - 202/181)  BP(mean): 97 (20 Aug 2019 11:00) (38 - 185)  RR: 18 (20 Aug 2019 11:00) (12 - 34)  SpO2: 100% (20 Aug 2019 11:00) (79% - 100%)        PE:  Constitutional: frail looking  HEENT: NC/AT, EOMI, PERRLA, conjunctivae clear; ears and nose atraumatic; pharynx benign  Neck: supple; thyroid not palpable  Back: no tenderness  Respiratory: respiratory effort normal; clear to auscultation  Cardiovascular: S1S2 regular, no murmurs  Abdomen: soft, not tender, not distended, positive BS  Genitourinary: no suprapubic tenderness  Lymphatic: no LN palpable  Musculoskeletal: no muscle tenderness, no joint swelling or tenderness  Extremities: no pedal edema  Neurological/ Psychiatric: moving all extremities  Skin: no rashes; no palpable lesions    Labs: all available labs reviewed                                              7.4    13.29 )-----------( 243      ( 20 Aug 2019 06:07 )             23.2     08-20    147<H>  |  120<H>  |  32<H>  ----------------------------<  101<H>  3.4<L>   |  22  |  0.57    Ca    7.6<L>      20 Aug 2019 06:07  Phos  4.8     08-18  Mg     2.2     08-19                Culture - Blood (08.13.19 @ 14:12)    Specimen Source: .Blood None    Culture Results:   No growth to date.    Culture - Urine (08.12.19 @ 08:59)    -  Aztreonam: S <=4    -  Amikacin: S <=16    -  Cefepime: S <=4    -  Cefoxitin: S <=8    -  Cefazolin: R >16 (MIC_CL_COM_ENTERIC_CEFAZU) For uncomplicated UTI with K. pneumoniae, E. coli, or P. mirablis: ASHLEY <=16 is sensitive and ASHLEY >=32 is resistant. This also predicts results for oral agents cefaclor, cefdinir, cefpodoxime, cefprozil, cefuroxime axetil, cephalexin and locarbef for uncomplicated UTI. Note that some isolates may be susceptible to these agents while testing resistant to cefazolin.    -  Ceftriaxone: S <=1 Enterobacter, Citrobacter, and Serratia may develop resistance during prolonged therapy    -  Ciprofloxacin: R >2    -  Gentamicin: S <=4    -  Imipenem: S <=1    -  Levofloxacin: R >4    -  Meropenem: S <=1    -  Nitrofurantoin: S <=32 Should not be used to treat pyelonephritis    -  Trimethoprim/Sulfamethoxazole: R >2/38    -  Tobramycin: S <=4    -  Tigecycline: S <=2    -  Piperacillin/Tazobactam: R >64    -  Ampicillin: R >16 These ampicillin results predict results for amoxicillin    -  Ampicillin/Sulbactam: R >16/8 Enterobacter, Citrobacter, and Serratia may develop resistance during prolonged therapy (3-4 days)    Specimen Source: .Urine left kidney urine    Culture Results:   Few Escherichia coli  Moderate Bacteroides fragilis "Susceptibilities not performed"  Rare Neisseria sicca "Susceptibilities not performed"    Organism Identification: Escherichia coli    Organism: Escherichia coli    Method Type: ASHLEY    Culture - Urine (08.12.19 @ 00:56)    -  Ampicillin: R >16 These ampicillin results predict results for amoxicillin    -  Tigecycline: S <=2    -  Piperacillin/Tazobactam: S <=16    -  Tobramycin: S <=4    -  Trimethoprim/Sulfamethoxazole: R >2/38    -  Nitrofurantoin: S <=32 Should not be used to treat pyelonephritis    -  Meropenem: S <=1    -  Gentamicin: S <=4    -  Levofloxacin: R >4    -  Imipenem: S <=1    -  Ciprofloxacin: R >2    -  Ceftriaxone: S <=1 Enterobacter, Citrobacter, and Serratia may develop resistance during prolonged therapy    -  Cefazolin: S 16 (MIC_CL_COM_ENTERIC_CEFAZU) For uncomplicated UTI with K. pneumoniae, E. coli, or P. mirablis: ASHLEY <=16 is sensitive and ASHLEY >=32 is resistant. This also predicts results for oral agents cefaclor, cefdinir, cefpodoxime, cefprozil, cefuroxime axetil, cephalexin and locarbef for uncomplicated UTI. Note that some isolates may be susceptible to these agents while testing resistant to cefazolin.    -  Cefoxitin: S <=8    -  Cefepime: S <=4    -  Ampicillin/Sulbactam: R >16/8 Enterobacter, Citrobacter, and Serratia may develop resistance during prolonged therapy (3-4 days)    -  Amikacin: S <=16    -  Aztreonam: S <=4    Specimen Source: .Urine None    Culture Results:   >100,000 CFU/ml Escherichia coli    Organism Identification: Escherichia coli    Organism: Escherichia coli    Method Type: ASHLEY      Culture - Blood (08.12.19 @ 00:36)    Gram Stain:   Growth in anaerobic bottle: Gram Positive Cocci in Clusters    -  Ampicillin/Sulbactam: R <=8/4    -  Cefazolin: R 8    -  Erythromycin: R >4    -  Clindamycin: R 0.5 This isolate is presumed to be clindamycin resistant based on detection of inducible resistance. Clindamycin may still be effective in some patients.    -  Gentamicin: S <=1 Should not be used as monotherapy    -  Oxacillin: R >2    -  Penicillin: R >8    -  Trimethoprim/Sulfamethoxazole: R >2/38    -  Vancomycin: S 2    -  RIF- Rifampin: S <=1 Should not be used as monotherapy    -  Tetra/Doxy: S <=1    Specimen Source: .Blood None    Organism: Staphylococcus hominis    Culture Results:   Growth in anaerobic bottle: Staphylococcus hominis    Organism Identification: Staphylococcus hominis    Method Type: ASHLEY        Radiology: all available radiological tests reviewed  < from: CT Angio Abdomen and Pelvis w/ IV Cont (08.12.19 @ 02:55) >  EXAM:  CT ANGIO ABD PELVIS (W)AW IC                          EXAM:  CTA CHEST DISSECTION (W)AW IC                            PROCEDURE DATE:  08/12/2019          INTERPRETATION:  CLINICAL INFORMATION: Anemia, sepsis. History of aortic     dissection and aortic surgery.    COMPARISON: Chest CT 2/5/2018.    PROCEDURE:   CT Angiography of the Chest, Abdomen and Pelvis.   Precontrast imaging was performed through the chest followed by arterial   phase imaging of the chest, abdomen and pelvis.  Intravenous contrast: 90 ml Omnipaque 350. 10 ml discarded.  Oral contrast: None.  Sagittal and coronal reformats were performed as well as 3D (MIP)   reconstructions.    FINDINGS:    CHEST:     LUNGS AND LARGE AIRWAYS: Patent central airways. There are irregular   patchy opacities in the left lung apex, new from the previous exams.   There is also scattered areas of tree-in-bud opacity throughout both   lungs, stable from the previous exam. Some of these include a more   nodular opacities in the right upper lobe which may reflect mucoid   impaction. New pleural-based nodules posterior medially in the right   lower lobe as seen on image #54, 57/series 3 are more pronounced and new.  PLEURA: No pleural effusion.  VESSELS: Patient is status post repair of an ascending aortic aneurysm,   stable. Again noted is dissection extending into the brachiocephalic   artery and right common carotid artery, with better opacification of the   lumens compared to the previous exam. Stable appearance of the   postsurgical aortic arch and descending thoracic aorta. Marked narrowing   is again noted at the level of the celiac artery stable appearance of the   proximal abdominal aorta with anterior bulge of the aneurysm sac. These   are again likely postoperative and stable. 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  HEART: Heart size is normal. No pericardial effusion.  MEDIASTINUM AND NAWAF: Borderline enlarged mediastinal lymph nodes. No   hilar adenopathy.  CHEST WALL AND LOWER NECK: The thyroid gland demonstrates an enlarged   heterogeneous right lobe with hypodense nodules. There is no   supraclavicular or axillary lymphadenopathy.    ABDOMEN AND PELVIS:    LIVER: Heterogeneous enhancement of the liver may be related to the early   arterial phase. There is a subcentimeter hypodensity in segment IVb, too   small to characterize.  BILE DUCTS: Normal caliber.  GALLBLADDER: Within normal limits.  SPLEEN: Within normal limits.  PANCREAS: Within normal limits.  ADRENALS: Within normal limits.  KIDNEYS/URETERS: Symmetric enhancement. The left kidney is atrophic.   There is mild hydroureteronephrosis due to a large 1.2 x 0.9 cm   obstructing calculus in the proximal left ureter. There is diffuse   urothelial enhancement, which may be reactive versus represent biloma   nephritis. There is a punctate 1 mm nonobstructing calculus at the lower   pole of the left kidney.    BLADDER: Within normal limits.  REPRODUCTIVE ORGANS: Uterus and adnexa within normal limits.    BOWEL: No bowel obstruction. Distention of the abdomen with ingested   material is likely related to recent food intake. Appendix is not   definitively identified. There is a moderate to large amount of stool   throughout the colon.  PERITONEUM: No ascites.  VESSELS: The abdominal aorta as described above. An IVC filter is present.  RETROPERITONEUM/LYMPH NODES: No lymphadenopathy.    ABDOMINAL WALL: There is a generator in the right lower anterior   abdominal wall and left gluteal region.  BONES: Median sternotomy wires. Degenerative change of the thoracolumbar   spine.    IMPRESSION:     Compared to 2//2018:    Stable appearance of thesurgically repaired thoracic aorta.  Stable appearance of the abdominal aorta.    Tree-in-bud opacities throughout both lungs are more pronounced on the   current exam.    Patchy opacities and solid appearing nodules in both lungs, measuring up   to 9 mm. Some of which are new and some have increased in size in the   interim.    1.2 cm obstructing calculus in the proximal left ureter with   mild-to-moderate hydronephrosis and probable underlying pyelonephritis.   Clinical correlation is recommended.        Advanced directives addressed: full resuscitation

## 2019-08-20 NOTE — PROGRESS NOTE ADULT - SUBJECTIVE AND OBJECTIVE BOX
Events Overnight:    was   66 yo F admitted 8/12 due to lethargy, fever, anemia, with obstructing LEFT renal stone, UTI sepsis and Anemia - pt underwent placement of LEFT ureteral stent.  UCx (+) neisseria sicca and bacteroides  Patient was transfused PRBC due to initial anemia.  Pt had 2 episodes of hypotension, stool was guaiac positive, hgb dropped but no clear active bleeding and f/u CTA negative.    pt was transferred to CCU as hgb drop to 6 (from 9 on 8/17), and hypotension.  Improved - s/p transfusion.    PMH significant for: Repair of Aortic Dissection 2009 than 2014, Spinal Hematoma leading to Paraplegia 2015, HTN, chronic pain - has baclofen pump  self-catheterization with chronic UTIs, IVC filter    had EGD on 8/19 which demonstrated large clot in the stomach but could not identify and specific or active source of bleeding.    above d/w Dr Winn who has been caring for the patient.    8/20: hemodynamics reasonable - case d/w Dr Yoo following repeat EGD this morning - large gastric clot again identified but no active bleeding.  Pt subsequently allowed to emerge from sedation - AA and following commands appropriately.  Placed on PSV 10/5 40% and monitored for 30-45 min - doing well with good VT and RR and sat 100%.  Extubated to FM O2 and doing well so far.      PAST MEDICAL & SURGICAL HISTORY:  Dorsalgia of lumbar region: on pain medication /baclofen po and pump  Self-catheterizes urinary bladder  Anemia: chronic  Uveitis  Osteoporosis  PAD (peripheral artery disease)  Hematoma: spinal  September  treated  September 2018  Paraplegia: on wheelchair goes to physical therapy 2 x weekly  Aortic dissection, thoracic: Type A Repaired 2009  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2018  UTI (urinary tract infection): stable x 3 months  TIA (transient ischemic attack)  HTN (Hypertension): on meds  History of corneal transplant: left corneal transplant on 5/21/2018  Disorder of spine: unthetethering 2 x  Presence of IVC filter: 2014 ?  S/P aortic dissection repair: Type A Dissection repair /2009   descending aortic aneurysm repair 9/2016  H/O Spinal surgery: laminectomies 2014      Allergies    No Known Allergies        ICU Vital Signs Last 24 Hrs  T(C): 36.4 (20 Aug 2019 08:00), Max: 37.1 (20 Aug 2019 06:02)  T(F): 97.6 (20 Aug 2019 08:00), Max: 98.8 (20 Aug 2019 06:02)  HR: 101 (20 Aug 2019 11:00) (78 - 128)  BP: 160/74 (20 Aug 2019 11:00) (96/52 - 202/181)  BP(mean): 97 (20 Aug 2019 11:00) (62 - 185)  RR: 18 (20 Aug 2019 11:00) (12 - 31)  SpO2: 100% (20 Aug 2019 11:00) (98% - 100%)      Mode: CPAP with PS  FiO2: 40  PEEP: 5  PS: 10      I&O's Summary    19 Aug 2019 07:01  -  20 Aug 2019 07:00  --------------------------------------------------------  IN: 2368 mL / OUT: 1495 mL / NET: 873 mL    20 Aug 2019 07:01  -  20 Aug 2019 12:01  --------------------------------------------------------  IN: 0 mL / OUT: 250 mL / NET: -250 mL                              7.4    13.29 )-----------( 243      ( 20 Aug 2019 06:07 )             23.2       08-20    147<H>  |  120<H>  |  32<H>  ----------------------------<  101<H>  3.4<L>   |  22  |  0.57    Ca    7.6<L>      20 Aug 2019 06:07  Phos  4.8     08-18  Mg     2.2     08-19      ABG - ( 18 Aug 2019 21:55 )  pH, Arterial: 7.32  pH, Blood: x     /  pCO2: 38    /  pO2: 189   / HCO3: 19    / Base Excess: -6.2  /  SaO2: 99          MEDICATIONS  (STANDING):  artificial  tears Solution 1 Drop(s) Left EYE three times a day  atorvastatin 40 milliGRAM(s) Oral at bedtime  baclofen 20 milliGRAM(s) Oral two times a day  DULoxetine 20 milliGRAM(s) Oral two times a day  gabapentin 600 milliGRAM(s) Oral three times a day  lidocaine   Patch 1 Patch Transdermal daily  metoclopramide Injectable 10 milliGRAM(s) IV Push every 6 hours  metroNIDAZOLE  IVPB 500 milliGRAM(s) IV Intermittent every 8 hours  pantoprazole Infusion 8 mG/Hr (10 mL/Hr) IV Continuous <Continuous>  polyethylene glycol 3350 17 Gram(s) Oral two times a day  prednisoLONE acetate 1% Suspension 1 Drop(s) Left EYE four times a day  sodium chloride 0.45%. 1000 milliLiter(s) (150 mL/Hr) IV Continuous <Continuous>  valACYclovir 1000 milliGRAM(s) Oral daily    MEDICATIONS  (PRN):  acetaminophen   Tablet .. 650 milliGRAM(s) Oral every 6 hours PRN Temp greater or equal to 38C (100.4F); MILD PAIN (1-3)  mineral oil enema 133 milliLiter(s) Rectal daily PRN Constipation  oxyCODONE    IR 10 milliGRAM(s) Oral four times a day PRN Moderate Pain (4 - 6)          DVT Prophylaxis: venodynes - no chemoprophylaxis due to GI bleeding, anemia due to acute hemorrhage.    Advanced Directives:  Discussed with:    Visit Information:    ** Time is exclusive of billed procedures and/or teaching and/or routine family updates. 66 yo F admitted 8/12 due to lethargy, fever, anemia, with obstructing LEFT renal stone, UTI sepsis and Anemia - pt underwent placement of LEFT ureteral stent.  UCx (+) neisseria sicca and bacteroides  Patient was transfused PRBC due to initial anemia.  Pt had 2 episodes of hypotension, stool was guaiac positive, hgb dropped but no clear active bleeding and f/u CTA negative.    pt was transferred to CCU as hgb drop to 6 (from 9 on 8/17), and hypotension.  Improved - s/p transfusion.    PMH significant for: Repair of Aortic Dissection 2009 than 2014, Spinal Hematoma leading to Paraplegia 2015, HTN, chronic pain - has baclofen pump  self-catheterization with chronic UTIs, IVC filter    had EGD on 8/19 which demonstrated large clot in the stomach but could not identify and specific or active source of bleeding.    above d/w Dr Winn who has been caring for the patient.    8/20: hemodynamics reasonable - case d/w Dr Yoo following repeat EGD this morning - large gastric clot again identified but no active bleeding.  Pt subsequently allowed to emerge from sedation - AA and following commands appropriately.  Placed on PSV 10/5 40% and monitored for 30-45 min - doing well with good VT and RR and sat 100%.  Extubated to FM O2 and doing well so far.      PAST MEDICAL & SURGICAL HISTORY:  Dorsalgia of lumbar region: on pain medication /baclofen po and pump  Self-catheterizes urinary bladder  Anemia: chronic  Uveitis  Osteoporosis  PAD (peripheral artery disease)  Hematoma: spinal  September  treated  September 2018  Paraplegia: on wheelchair goes to physical therapy 2 x weekly  Aortic dissection, thoracic: Type A Repaired 2009  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2018  UTI (urinary tract infection): stable x 3 months  TIA (transient ischemic attack)  HTN (Hypertension): on meds  History of corneal transplant: left corneal transplant on 5/21/2018  Disorder of spine: unthetethering 2 x  Presence of IVC filter: 2014 ?  S/P aortic dissection repair: Type A Dissection repair /2009   descending aortic aneurysm repair 9/2016  H/O Spinal surgery: laminectomies 2014      Allergies    No Known Allergies        ICU Vital Signs Last 24 Hrs  T(C): 36.4 (20 Aug 2019 08:00), Max: 37.1 (20 Aug 2019 06:02)  T(F): 97.6 (20 Aug 2019 08:00), Max: 98.8 (20 Aug 2019 06:02)  HR: 101 (20 Aug 2019 11:00) (78 - 128)  BP: 160/74 (20 Aug 2019 11:00) (96/52 - 202/181)  BP(mean): 97 (20 Aug 2019 11:00) (62 - 185)  RR: 18 (20 Aug 2019 11:00) (12 - 31)  SpO2: 100% (20 Aug 2019 11:00) (98% - 100%)      Mode: CPAP with PS  FiO2: 40  PEEP: 5  PS: 10      I&O's Summary    19 Aug 2019 07:01  -  20 Aug 2019 07:00  --------------------------------------------------------  IN: 2368 mL / OUT: 1495 mL / NET: 873 mL    20 Aug 2019 07:01  -  20 Aug 2019 12:01  --------------------------------------------------------  IN: 0 mL / OUT: 250 mL / NET: -250 mL                              7.4    13.29 )-----------( 243      ( 20 Aug 2019 06:07 )             23.2       08-20    147<H>  |  120<H>  |  32<H>  ----------------------------<  101<H>  3.4<L>   |  22  |  0.57    Ca    7.6<L>      20 Aug 2019 06:07  Phos  4.8     08-18  Mg     2.2     08-19      ABG - ( 18 Aug 2019 21:55 )  pH, Arterial: 7.32  pH, Blood: x     /  pCO2: 38    /  pO2: 189   / HCO3: 19    / Base Excess: -6.2  /  SaO2: 99          MEDICATIONS  (STANDING):  artificial  tears Solution 1 Drop(s) Left EYE three times a day  atorvastatin 40 milliGRAM(s) Oral at bedtime  baclofen 20 milliGRAM(s) Oral two times a day  DULoxetine 20 milliGRAM(s) Oral two times a day  gabapentin 600 milliGRAM(s) Oral three times a day  lidocaine   Patch 1 Patch Transdermal daily  metoclopramide Injectable 10 milliGRAM(s) IV Push every 6 hours  metroNIDAZOLE  IVPB 500 milliGRAM(s) IV Intermittent every 8 hours  pantoprazole Infusion 8 mG/Hr (10 mL/Hr) IV Continuous <Continuous>  polyethylene glycol 3350 17 Gram(s) Oral two times a day  prednisoLONE acetate 1% Suspension 1 Drop(s) Left EYE four times a day  sodium chloride 0.45%. 1000 milliLiter(s) (150 mL/Hr) IV Continuous <Continuous>  valACYclovir 1000 milliGRAM(s) Oral daily    MEDICATIONS  (PRN):  acetaminophen   Tablet .. 650 milliGRAM(s) Oral every 6 hours PRN Temp greater or equal to 38C (100.4F); MILD PAIN (1-3)  mineral oil enema 133 milliLiter(s) Rectal daily PRN Constipation  oxyCODONE    IR 10 milliGRAM(s) Oral four times a day PRN Moderate Pain (4 - 6)          DVT Prophylaxis: venodynes - no chemoprophylaxis due to GI bleeding, anemia due to acute hemorrhage.    Advanced Directives:  Discussed with:    Visit Information:    ** Time is exclusive of billed procedures and/or teaching and/or routine family updates.

## 2019-08-20 NOTE — PROGRESS NOTE ADULT - ASSESSMENT
67 yo F suffering from acute GI bleeding and anemia due to acute blood loss/hemorrhage  sepsis from obstructing kidney stone now s/p ureteral stent  shock due to hypovolemia/hemorrhage/anemia - resolved  Intubated for protection of airway on 8/19 - now extubated  CTA - NO evidence bleeding, no dissection  hypokalemia  hypernatremia due to dehydration and hypovolemia    Plan at this time is for continued care in CCU  HOB 30  GI and DVT prophylaxis as appropriate  repeat Hgb 14:00  repeat BMP Mg/Phos 14:00  Pt to complete 10 days Rocephin, Flagyl  Urology to decide prior to D/c whether to address stone during this admission or at later date  Will continue labetalol  supplement lytes  IVF 0.45 saline and recheck Na.  clear liquid diet  GI input/intervention appreciated  Supportive care

## 2019-08-20 NOTE — PROGRESS NOTE ADULT - RS GEN PE MLT RESP DETAILS PC
good air movement/airway patent/breath sounds equal
good air movement/breath sounds equal
airway patent/breath sounds equal/good air movement

## 2019-08-20 NOTE — PROGRESS NOTE ADULT - ASSESSMENT
67 year old female with PMH  Repair of Aortic Dissection 2009 than 2014, Spinal Hematoma leading to Paraplegia 2015, HTN, chronic pain with baclofen pump, neurogenic bladder/chronic UTIs- self catheterization, ? IVC filter, anemia initially admitted 8/12 with complaints of weakness to the point she was unable to stand up, here found to have wbc ct 30, LA 7.4, Cr 1.1, hgb 4.3, no overt GI bleeding, FOBT + UA grossly positive, CT abd/pelvis remarkable for 1.2 cm obstructing proximal L ureter with stone/mild to moderate hydronephrosis and underlying pyelonephritis was eval by urology s/p cystoscopy/L ureteral stent placement, urine cx grew Ecoli and intraop urine cx grew Neserria sicca/Ecoli/bacteroides she was on IV zosyn then switched to rocephin 8/15.     1. sepsis. L obstructing ureteral stone s/p stent. cystitis-pyelonephritis. neurogenic bladder. GI bleed  - afebrile, wbc ct improving, s/p endoscopy for UGI bleed  - s/p zosyn #5, s/p rocephin 1gm daily #5  - 10 day abx course completed, will dc rocephin  - on flagyl for bacteroides coverage #5/7  - had initial blood cx growing staph hominis - this is generally considered contaminant repeat blood cx no growth  - eventual stone treatment  - monitor temps  - tolerating abx well so far; no side effects noted  - reason for abx use and side effects reviewed with patient  - supportive care  - fu cbc

## 2019-08-21 NOTE — PROGRESS NOTE ADULT - ASSESSMENT
67y/o female admitted with severe acute on chronic anemia/sepsis noted to have hydronephrosis, urethral stone s/p cystoscopy and stent placement.  With significant upper gi bleed on Monday.  Currently stable, on a protonix drip.  S/p EGD with Dr. Yoo noted a significant upper gi bleed.   Will need to repeat likely on Friday to further evaluate as location was difficult to determine previously.    IV ABX.   Trend H/H, transfuse as needed.     Discussed with pt, ICU, Dr. Conway/Naila. 67y/o female admitted with severe acute on chronic anemia/sepsis noted to have hydronephrosis, urethral stone s/p cystoscopy and stent placement.  With significant upper gi bleed on Monday.  Currently stable, on a protonix drip.  S/p EGD with Dr. Yoo noted a significant upper gi bleed-large blood clot in proximal stomach.   Will need to repeat likely on Friday to further evaluate.   IV ABX.   Trend H/H, transfuse as needed.     Discussed with pt, ICU, Dr. Conway/Naila.

## 2019-08-21 NOTE — PROGRESS NOTE ADULT - ASSESSMENT
67 yo F suffering from acute GI bleeding and anemia due to acute blood loss/hemorrhage  sepsis from obstructing kidney stone now s/p ureteral stent  shock due to hypovolemia/hemorrhage/anemia - resolved  Intubated for protection of airway on 8/19 - now extubated  CTA - NO evidence bleeding, no dissection  hypokalemia  hypernatremia due to dehydration and hypovolemia    Plan at this time is for continued care in CCU  HOB 30  GI and DVT prophylaxis as appropriate  repeat Hgb 14:00  repeat BMP Mg/Phos 14:00  Pt to complete 10 days Rocephin, Flagyl  Urology to decide prior to D/c whether to address stone during this admission or at later date  Will continue labetalol  supplement lytes  IVF 0.45 saline and recheck Na.  clear liquid diet  GI input/intervention appreciated  Supportive care 69 yo F suffering from acute GI bleeding and anemia due to acute blood loss/hemorrhage  sepsis from obstructing kidney stone now s/p ureteral stent  shock due to hypovolemia/hemorrhage/anemia - resolved  Intubated for protection of airway on 8/19 - extubated 8/20  CTA - NO evidence bleeding, no dissection  hypokalemia  hypernatremia due to dehydration and hypovolemia    Plan at this time is for continued care in CCU  HOB 30  change IVF o D5W at 50/hr  GI and DVT prophylaxis as appropriate  repeat Hgb/BMP 20:00  Pt to complete 10 days Rocephin, Flagyl  Urology to decide prior to D/c whether to address stone during this admission or at later date  Will continue labetalol  supplement lytes  IVF 0.45 saline and recheck Na.  clear liquid diet  GI input/intervention appreciated  Supportive care

## 2019-08-21 NOTE — PROGRESS NOTE ADULT - SUBJECTIVE AND OBJECTIVE BOX
Patient is a 68y old  Female who presents with a chief complaint of Lethargy, weakness (21 Aug 2019 07:39)    HPI:  This patient is a 67 year old female with PMH signficiant for:                   Repair of Aortic Dissection 2009 than 2014                   Spinal Hematoma leading to Paraplegia 2015                   HTN                   chronic pain - has baclofen pump                   self catheterization with chronic UTIs                  ? ivc filter  In January patient had hgb of 10.8 but low mcv,    Today she presents with weakness over a week , so bad that today she could barely sit up.  In ED she was found to have hgb of 4.3, with dec mcv, no dark stools, no hx. GI bleeding,  ct done in ED pending  also pyuria, likely UTI  Sinus tach to 150 (12 Aug 2019 03:20)    8/21/2019-  Feels better today, no complaints.     PAST MEDICAL & SURGICAL HISTORY:  Dorsalgia of lumbar region: on pain medication /baclofen po and pump  Self-catheterizes urinary bladder  Anemia: chronic  Uveitis  Osteoporosis  PAD (peripheral artery disease)  Hematoma: spinal  September  treated  September 2018  Paraplegia: on wheelchair goes to physical therapy 2 x weekly  Aortic dissection, thoracic: Type A Repaired 2009  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2018  UTI (urinary tract infection): stable x 3 months  TIA (transient ischemic attack)  HTN (Hypertension): on meds  History of corneal transplant: left corneal transplant on 5/21/2018  Disorder of spine: unthetethering 2 x  Presence of IVC filter: 2014 ?  S/P aortic dissection repair: Type A Dissection repair /2009   descending aortic aneurysm repair 9/2016  H/O Spinal surgery: laminectomies 2014    MEDICATIONS  (STANDING):  atorvastatin 40 milliGRAM(s) Oral at bedtime  baclofen 20 milliGRAM(s) Oral two times a day  dextrose 5%. 1000 milliLiter(s) (50 mL/Hr) IV Continuous <Continuous>  DULoxetine 20 milliGRAM(s) Oral two times a day  gabapentin 600 milliGRAM(s) Oral three times a day  lidocaine   Patch 1 Patch Transdermal daily  metroNIDAZOLE  IVPB 500 milliGRAM(s) IV Intermittent every 8 hours  pantoprazole Infusion 8 mG/Hr (10 mL/Hr) IV Continuous <Continuous>  polyethylene glycol 3350 17 Gram(s) Oral two times a day  prednisoLONE acetate 1% Suspension 1 Drop(s) Left EYE four times a day  valACYclovir 1000 milliGRAM(s) Oral daily    MEDICATIONS  (PRN):  acetaminophen   Tablet .. 650 milliGRAM(s) Oral every 6 hours PRN Temp greater or equal to 38C (100.4F); MILD PAIN (1-3)  zolpidem 5 milliGRAM(s) Oral at bedtime PRN Insomnia    Allergies  No Known Allergies    FAMILY HISTORY:  Family history of Alzheimer's disease    REVIEW OF SYSTEMS:  CONSTITUTIONAL: No weakness, fevers or chills  RESPIRATORY: No cough, wheezing, hemoptysis; No shortness of breath  CARDIOVASCULAR: No chest pain or palpitations  GASTROINTESTINAL: Per HPI.    Vital Signs Last 24 Hrs  T(C): 37.1 (21 Aug 2019 06:20), Max: 37.1 (21 Aug 2019 06:20)  T(F): 98.7 (21 Aug 2019 06:20), Max: 98.7 (21 Aug 2019 06:20)  HR: 102 (21 Aug 2019 11:00) (79 - 106)  BP: 175/81 (21 Aug 2019 11:00) (145/73 - 189/75)  BP(mean): 105 (21 Aug 2019 11:00) (86 - 115)  RR: 14 (21 Aug 2019 11:00) (12 - 26)  SpO2: 100% (21 Aug 2019 11:00) (95% - 100%)    PHYSICAL EXAM:  Constitutional: Middle aged female with multiple medical issues in NAD.   Respiratory: CTAB  Cardiovascular: S1 and S2, RRR, no M/G/R  Gastrointestinal: BS+, soft, NT/ND    LABS:                        7.8    14.46 )-----------( 304      ( 21 Aug 2019 06:25 )             24.1     08-21    146<H>  |  114<H>  |  13  ----------------------------<  94  3.3<L>   |  27  |  0.40<L>    Ca    8.0<L>      21 Aug 2019 06:25  Phos  2.7     08-21  Mg     1.8     08-21            RADIOLOGY & ADDITIONAL STUDIES:

## 2019-08-21 NOTE — PROGRESS NOTE ADULT - SUBJECTIVE AND OBJECTIVE BOX
68 yo F admitted 8/12 due to lethargy, fever, anemia, with obstructing LEFT renal stone, UTI sepsis and Anemia - pt underwent placement of LEFT ureteral stent.  UCx (+) neisseria sicca and bacteroides  Patient was transfused PRBC due to initial anemia.  Pt had 2 episodes of hypotension, stool was guaiac positive, hgb dropped but no clear active bleeding and f/u CTA negative.    pt was transferred to CCU as hgb drop to 6 (from 9 on 8/17), and hypotension.  Improved - s/p transfusion.    PMH significant for: Repair of Aortic Dissection 2009 than 2014, Spinal Hematoma leading to Paraplegia 2015, HTN, chronic pain - has baclofen pump  self-catheterization with chronic UTIs, IVC filter    had EGD on 8/19 which demonstrated large clot in the stomach but could not identify and specific or active source of bleeding.    above d/w Dr Winn who has been caring for the patient.    8/20: hemodynamics reasonable - case d/w Dr Yoo following repeat EGD this morning - large gastric clot again identified but no active bleeding.  Pt subsequently allowed to emerge from sedation - AA and following commands appropriately.  Placed on PSV 10/5 40% and monitored for 30-45 min - doing well with good VT and RR and sat 100%.  Extubated to FM O2 and doing well so far.    8/21: hgb reasonably stable - no evidence for significant ongoing bleeding.  Hemodynamics and respiratory function reasonable.  Tolerating clear liquids without issue.       Allergies    No Known Allergies      ICU Vital Signs Last 24 Hrs  T(C): 37.1 (21 Aug 2019 06:20), Max: 37.1 (21 Aug 2019 06:20)  T(F): 98.7 (21 Aug 2019 06:20), Max: 98.7 (21 Aug 2019 06:20)  HR: 98 (21 Aug 2019 13:00) (79 - 113)  BP: 165/76 (21 Aug 2019 13:00) (152/66 - 189/75)  BP(mean): 98 (21 Aug 2019 13:00) (86 - 115)  RR: 15 (21 Aug 2019 13:00) (12 - 26)  SpO2: 100% (21 Aug 2019 13:00) (95% - 100%)          I&O's Summary    20 Aug 2019 07:01  -  21 Aug 2019 07:00  --------------------------------------------------------  IN: 2020 mL / OUT: 1800 mL / NET: 220 mL    21 Aug 2019 07:01  -  21 Aug 2019 13:45  --------------------------------------------------------  IN: 625 mL / OUT: 700 mL / NET: -75 mL                              7.8    14.46 )-----------( 304      ( 21 Aug 2019 06:25 )             24.1       08-21    146<H>  |  114<H>  |  13  ----------------------------<  94  3.3<L>   |  27  |  0.40<L>    Ca    8.0<L>      21 Aug 2019 06:25  Phos  2.7     08-21  Mg     1.8     08-21      MEDICATIONS  (STANDING):  atorvastatin 40 milliGRAM(s) Oral at bedtime  baclofen 20 milliGRAM(s) Oral two times a day  dextrose 5%. 1000 milliLiter(s) (50 mL/Hr) IV Continuous <Continuous>  DULoxetine 20 milliGRAM(s) Oral two times a day  gabapentin 600 milliGRAM(s) Oral three times a day  lidocaine   Patch 1 Patch Transdermal daily  metroNIDAZOLE  IVPB 500 milliGRAM(s) IV Intermittent every 8 hours  pantoprazole Infusion 8 mG/Hr (10 mL/Hr) IV Continuous <Continuous>  polyethylene glycol 3350 17 Gram(s) Oral two times a day  prednisoLONE acetate 1% Suspension 1 Drop(s) Left EYE four times a day  valACYclovir 1000 milliGRAM(s) Oral daily    MEDICATIONS  (PRN):  acetaminophen   Tablet .. 650 milliGRAM(s) Oral every 6 hours PRN Temp greater or equal to 38C (100.4F); MILD PAIN (1-3)  zolpidem 5 milliGRAM(s) Oral at bedtime PRN Insomnia    Review of Systems:   · Negative General Symptoms	no fever; no chills	  · Negative Skin Symptoms	no rash; no itching	  · Negative Ophthalmologic Symptoms	no diplopia; no photophobia	  · Negative ENMT Symptoms	no dysphagia	  · Negative Respiratory and Thorax Symptoms	no wheezing; no dyspnea; no cough	  · Negative Cardiovascular Symptoms	no chest pain; no palpitations	  · Negative Gastrointestinal Symptoms	no nausea; no vomiting	  · Gastrointestinal Symptoms	melena	  · Negative General Genitourinary Symptoms	no dysuria	  · Musculoskeletal Comments	Chronic LE plegia	  · Negative Neurological Symptoms	no headache; no loss of consciousness	  · Negative Psychiatric Symptoms	no suicidal ideation; no depression; no anxiety	  · Negative Hematology Symptoms	no gum bleeding; no nose bleeding	  · Negative Allergic Reactions	no urticaria	  · Additional ROS	ALL other ROS are NEGATIVE.	    Physical Exam:   · Constitutional	detailed exam	  · Constitutional Details	no distress	  · Eyes	detailed exam	  · Eyes Details	PERRL; EOMI	  · ENMT	detailed exam	  · ENMT Details	mouth	  · Mouth	moist	  · Neck	detailed exam 	  · Neck Details	supple; no JVD	  · Back	detailed exam 	  · Back Details	normal shape	  · Respiratory	detailed exam	  · Respiratory Details	airway patent; breath sounds equal; good air movement	  · Cardiovascular	detailed exam	  · Cardiovascular Details	regular rate and rhythm 	  · Gastrointestinal	detailed exam	  · GI Normal	soft; nontender; no distention; bowel sounds normal	  · Extremities	detailed exam 	  · Extremities Details	no cyanosis; no pedal edema	  · Vascular	Equal and normal pulses (carotid, femoral, dorsalis pedis) 	  · Neurological	detailed exam	  · Neurological Details	alert and oriented x 3	  · Motor	B/L LE plegia, B/L LE contractures	  · Skin	detailed exam	  · Skin Details	warm and dry	  · Musculoskeletal	detailed exam	  · Musculoskeletal Comments	good strength in UE's B/L	  · Psychiatric	detailed exam	  · Psychiatric Details	normal affect; normal behavior	      DVT Prophylaxis: venodynes - no chemoprophylaxis due to GI bleeding, anemia due to acute hemorrhage.    Advanced Directives:  Discussed with:    Visit Information:    ** Time is exclusive of billed procedures and/or teaching and/or routine family updates.

## 2019-08-21 NOTE — CHART NOTE - NSCHARTNOTEFT_GEN_A_CORE
Assessment:   *pt being managed for severe acute on chronic anemia/sepsis with hydronephrosis, urethral stone s/p cystoscopy and stent placement with significant upper GIB.  s/p EGD with significant upper GIB in proximal stomach, pending re-peat eval on Friday.  GI following.  *pt tolerating clear liquid diet well with minimum intake as she dislikes it and wants solid food.  Pt meeting <25% of estimated nutr needs due to current diet Rx restrictions.  as medically feasinle, advance diet to regular and allow for milkshakes.  *continue to check wt daily to track/trend changes.  *labs reviewed; hypernatremia, hypokalemia; borderline hypomagnesemia. monitor and replete prn.  *moderate generalized ad Lt/Rt foot edema.  BM (+) 8/20, dark red loose stools.    Recommendations:  1) as medically feasible, advance diet to regular with milkshakes (Vanilla)  2) monitor PO intake/tolerance  3) daily wt checks  4) monitor lytes/mins; replete/correct prn      Diet Prescription: Diet, Clear Liquid (08-20-19 @ 09:25)      Wt Hx:  59.4Kg (8/21)  58.8Kg (8/20)  Weight (kg): 63.5 (08-12-19 @ 00:10)          Estimated Needs:   [x] no change since previous assessment  Estimated Energy Needs (30-35 calories/kg):  · Weight  (lbs) 116.8 lb  · Weight (kg) 53 kg  · From (30 eugenio/kg) 1590 To (35 calories/kg) 1855    Other Calculation:  · Other Calculation  Ht. 65 "     Wt. 52.6 Kg       19 BMI       IBW 57  Kg      Pt is at 92 %  IBW    Estimated Protein Needs (1.6-1.8 gm/kg):  · Weight  (lbs) 116.8  · Weight (kg) 53 kg  · From (1.6 g/kg) 84.8 To (1.8 g/kg) 95.4    moderate malnutrition in chronic illness r/t decreased ability to consume sufficient energy/protein 2/2 increased needs AEB severe muscle/mild fat wasting; moderate edema.    Nutrition Diagnosis is [x] ongoing  [ ] resolved [ ] not applicable     New Nutrition Diagnosis: [x] not applicable         Pertinent Medications: MEDICATIONS  (STANDING):  atorvastatin 40 milliGRAM(s) Oral at bedtime  baclofen 20 milliGRAM(s) Oral two times a day  dextrose 5%. 1000 milliLiter(s) (50 mL/Hr) IV Continuous <Continuous>  DULoxetine 20 milliGRAM(s) Oral two times a day  gabapentin 600 milliGRAM(s) Oral three times a day  lidocaine   Patch 1 Patch Transdermal daily  metroNIDAZOLE  IVPB 500 milliGRAM(s) IV Intermittent every 8 hours  pantoprazole Infusion 8 mG/Hr (10 mL/Hr) IV Continuous <Continuous>  polyethylene glycol 3350 17 Gram(s) Oral two times a day  prednisoLONE acetate 1% Suspension 1 Drop(s) Left EYE four times a day  valACYclovir 1000 milliGRAM(s) Oral daily    MEDICATIONS  (PRN):  acetaminophen   Tablet .. 650 milliGRAM(s) Oral every 6 hours PRN Temp greater or equal to 38C (100.4F); MILD PAIN (1-3)  zolpidem 5 milliGRAM(s) Oral at bedtime PRN Insomnia    Pertinent Labs: 08-21 Na146 mmol/L<H> Glu 94 mg/dL K+ 3.3 mmol/L<L> Cr  0.40 mg/dL<L> BUN 13 mg/dL 08-21 Phos 2.7 mg/dL      Skin: manas score = 9  stage 2 PU on sacrum/coccyx    Monitoring and Evaluation:   [x] PO intake/Nutr support infusion [ x ] Tolerance to Nutr [ x ] weights [ x ] labs[ x ] follow up per protocol  [ ] other:

## 2019-08-21 NOTE — PROGRESS NOTE ADULT - SUBJECTIVE AND OBJECTIVE BOX
CHIEF COMPLAINT: Patient is a 68y old  Female who presents with a chief complaint of Lethargy, weakness (17 Aug 2019 19:33)      HPI: HPI:  This patient is a 67 year old female with PMH signficiant for:                   Repair of Aortic Dissection 2009 than 2014                   Spinal Hematoma leading to Paraplegia 2015                   HTN                   chronic pain - has baclofen pump                   self catheterization with chronic UTIs                  ? ivc filter  In January patient had hgb of 10.8 but low mcv,    Today she presents with weakness over a week , so bad that today she could barely sit up.  In ED she was found to have hgb of 4.3, with dec mcv, no dark stools, no hx. GI bleeding,  ct done in ED pending  also pyuria, likely UTI  Sinus tach to 150 (12 Aug 2019 03:20)    8/21/19:  Recent events noted with apparent recurrent GI bleeding and hypotension/tachykardia.  Now in CCU.       PMHx: PAST MEDICAL & SURGICAL HISTORY:  Dorsalgia of lumbar region: on pain medication /baclofen po and pump  Self-catheterizes urinary bladder  Anemia: chronic  Uveitis  Osteoporosis  PAD (peripheral artery disease)  Hematoma: spinal  September  treated  September 2018  Paraplegia: on wheelchair goes to physical therapy 2 x weekly  Aortic dissection, thoracic: Type A Repaired 2009  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2018  UTI (urinary tract infection): stable x 3 months  TIA (transient ischemic attack)  HTN (Hypertension): on meds  History of corneal transplant: left corneal transplant on 5/21/2018  Disorder of spine: unthetethering 2 x  Presence of IVC filter: 2014 ?  S/P aortic dissection repair: Type A Dissection repair /2009   descending aortic aneurysm repair 9/2016  H/O Spinal surgery: laminectomies 2014        Soc Hx:     FAMILY HISTORY:  Family history of Alzheimer's disease      Allergies: Allergies    No Known Allergies    Intolerances          REVIEW OF SYSTEMS:    As above  No chest pain or shortness of breath  No lightheadeness or syncope  No leg swelling  No palpitations  No claudication-like symptoms    ICU Vital Signs Last 24 Hrs  T(C): 37.1 (21 Aug 2019 06:20), Max: 37.1 (21 Aug 2019 06:20)  T(F): 98.7 (21 Aug 2019 06:20), Max: 98.7 (21 Aug 2019 06:20)  HR: 93 (21 Aug 2019 07:00) (79 - 106)  BP: 174/70 (21 Aug 2019 06:00) (145/73 - 202/181)  BP(mean): 96 (21 Aug 2019 06:00) (86 - 185)  ABP: --  ABP(mean): --  RR: 20 (21 Aug 2019 07:00) (12 - 26)  SpO2: 100% (21 Aug 2019 07:00) (95% - 100%)      I&O's Summary    17 Aug 2019 07:01  -  18 Aug 2019 07:00  --------------------------------------------------------  IN: 480 mL / OUT: 0 mL / NET: 480 mL    18 Aug 2019 07:01  -  18 Aug 2019 11:54  --------------------------------------------------------  IN: 480 mL / OUT: 0 mL / NET: 480 mL        CAPILLARY BLOOD GLUCOSE      POCT Blood Glucose.: 174 mg/dL (17 Aug 2019 17:50)      PHYSICAL EXAM:   Patient in NAD  Neck: No JVD; Carotids:  2+ without bruits  Respiratory:  Clear to A&P  Cardiovascular: S1 and S2, regular rate and rhythm, no Murmurs, gallops or rubs  Gastrointestinal:  Soft, non-tender; BS positive  Extremities: No peripheral edema  Vascular: 2+ peripheral pulses  Neurological: A/O x 3, no focal deficits      MEDICATIONS:  MEDICATIONS  (STANDING):  artificial  tears Solution 1 Drop(s) Left EYE three times a day  atorvastatin 40 milliGRAM(s) Oral at bedtime  baclofen 20 milliGRAM(s) Oral two times a day  cefTRIAXone Injectable. 1000 milliGRAM(s) IV Push every 24 hours  DULoxetine 20 milliGRAM(s) Oral two times a day  gabapentin 600 milliGRAM(s) Oral three times a day  lidocaine   Patch 1 Patch Transdermal daily  metroNIDAZOLE    Tablet 500 milliGRAM(s) Oral every 8 hours  polyethylene glycol 3350 17 Gram(s) Oral two times a day  polyethylene glycol/electrolyte Solution. 4000 milliLiter(s) Oral once  prednisoLONE acetate 1% Suspension 1 Drop(s) Left EYE four times a day  valACYclovir 1000 milliGRAM(s) Oral daily      LABS: All Labs Reviewed:  Blood Culture: Organism --  Gram Stain Blood -- Gram Stain --  Specimen Source .Urine Catheterized  Culture-Blood --    Organism --  Gram Stain Blood -- Gram Stain --  Specimen Source .Blood None  Culture-Blood --      BNP   CBC             WBC Count: 9.75 K/uL (08-18 @ 06:54)  WBC Count: 11.12 K/uL (08-17 @ 18:10)              Hemoglobin: 7.4 g/dL (08-18 @ 06:54)  Hemoglobin: 7.7 g/dL (08-17 @ 18:10)  Hemoglobin: 9.0 g/dL (08-17 @ 07:34)  Hemoglobin: 8.3 g/dL (08-16 @ 08:08)  Hemoglobin: 7.5 g/dL (08-15 @ 07:38)  Hemoglobin: 6.8 g/dL (08-14 @ 06:19)              Hematocrit: 24.8 % (08-18 @ 06:54)  Hematocrit: 24.9 % (08-17 @ 18:10)  Hematocrit: 30.0 % (08-17 @ 07:34)  Hematocrit: 27.1 % (08-16 @ 08:08)  Hematocrit: 24.5 % (08-15 @ 07:38)  Hematocrit: 21.9 % (08-14 @ 06:19)              Mean Cell Volume: 82.4 fl (08-18 @ 06:54)  Mean Cell Volume: 81.9 fl (08-17 @ 18:10)  Mean Cell Volume: 82.0 fl (08-17 @ 07:34)  Mean Cell Volume: 81.1 fl (08-16 @ 08:08)  Mean Cell Volume: 80.1 fl (08-15 @ 07:38)  Mean Cell Volume: 79.3 fl (08-14 @ 06:19)              Platelet Count - Automated: 390 K/uL (08-18 @ 06:54)  Platelet Count - Automated: 364 K/uL (08-17 @ 18:10)  Platelet Count - Automated: 279 K/uL (08-17 @ 07:34)  Platelet Count - Automated: 262 K/uL (08-16 @ 08:08)  Platelet Count - Automated: 191 K/uL (08-15 @ 07:38)  Platelet Count - Automated: 215 K/uL (08-14 @ 06:19)                        7.8    14.46 )-----------( 304      ( 21 Aug 2019 06:25 )             24.1                               Cardiac markers                                        Chems        Sodium, Serum: 141 mmol/L (08-18 @ 06:54)  Sodium, Serum: 140 mmol/L (08-17 @ 07:34)  Sodium, Serum: 142 mmol/L (08-16 @ 08:08)  Sodium, Serum: 145 mmol/L (08-15 @ 07:38)  Sodium, Serum: 146 mmol/L (08-14 @ 06:19)          Potassium, Serum: 4.2 mmol/L (08-18 @ 06:54)  Potassium, Serum: 3.6 mmol/L (08-17 @ 07:34)  Potassium, Serum: 3.9 mmol/L (08-16 @ 08:08)  Potassium, Serum: 4.0 mmol/L (08-15 @ 07:38)  Potassium, Serum: 3.8 mmol/L (08-14 @ 06:19)          Blood Urea Nitrogen, Serum: 26 mg/dL (08-18 @ 06:54)  Blood Urea Nitrogen, Serum: 10 mg/dL (08-17 @ 07:34)  Blood Urea Nitrogen, Serum: 11 mg/dL (08-16 @ 08:08)  Blood Urea Nitrogen, Serum: 14 mg/dL (08-15 @ 07:38)  Blood Urea Nitrogen, Serum: 22 mg/dL (08-14 @ 06:19)          Creatinine 0.56  Creatinine 0.52  Creatinine 0.57  Creatinine 0.54  Creatinine 0.56          Magnesium, Serum: 3.1 mg/dL (08-18 @ 06:54)  Magnesium, Serum: 2.1 mg/dL (08-17 @ 07:34)  Magnesium, Serum: 2.1 mg/dL (08-16 @ 08:08)  Magnesium, Serum: 2.1 mg/dL (08-15 @ 07:38)                          Calcium, Total Serum: 8.7 mg/dL (08-18 @ 06:54)  Calcium, Total Serum: 8.9 mg/dL (08-17 @ 07:34)  Calcium, Total Serum: 8.8 mg/dL (08-16 @ 08:08)  Calcium, Total Serum: 8.7 mg/dL (08-15 @ 07:38)  Calcium, Total Serum: 8.9 mg/dL (08-14 @ 06:19)    08-21    146<H>  |  114<H>  |  13  ----------------------------<  94  3.3<L>   |  27  |  0.40<L>    Ca    8.0<L>      21 Aug 2019 06:25  Phos  2.7     08-21  Mg     1.8     08-21                                                      RADIOLOGY: < from: CT Angio Chest Dissection Protocol (08.12.19 @ 02:53) >  7 x 11 mm calcified stone in themid left ureter creating a moderate left   hydronephrosis and a delayed nephrogram.  Mild wall thickening and   enhancement   of the left renal pelvis and proximal ureter could indicate an underlying   infection/UTI.    < end of copied text >  < from: CT Angio Chest Dissection Protocol (08.12.19 @ 02:53) >   Aneurysmal change of the abdominal aorta measuring 4.1   cm, no rupture.      < end of copied text >  < from: CT Angio Chest Dissection Protocol (08.12.19 @ 02:53) >  Stable appearance of thesurgically repaired thoracic aorta.  Stable appearance of the abdominal aorta.    Tree-in-bud opacities throughout both lungs are more pronounced on the   current exam.    Patchy opacities and solid appearing nodules in both lungs, measuring up   to 9 mm. Some of which are new and some have increased in size in the   interim.    1.2 cm obstructing calculus in the proximal left ureter with   mild-to-moderate hydronephrosis and probable underlying pyelonephritis.   Clinical correlation is recommended.    < end of copied text >      EKG: NSR; IRBBB    Telemetry:  Sinus    ECHO:  Normal overall LV systolic function; status post aneurysm repair; mildly dilated RA/RV; mild valvular abnormalites

## 2019-08-21 NOTE — PROGRESS NOTE ADULT - ASSESSMENT
67 year old woman with the above complex past medical history admitted with severe anemia.  Work up in progress.  She has had labile BPs.  I agree with holding all BP medications for now.  Will obtain a TTE and follow up with the result.      8/21/19:  Recurrent GIB leading to hypotension but her cardiac status appears stable.  Currently hypertensive but I would not treat this at the present time for fear of another unstable event.  To get repeat EGD at the end of the week.

## 2019-08-22 NOTE — DISCHARGE NOTE PROVIDER - NSDCFUSCHEDAPPT_GEN_ALL_CORE_FT
BRENT MONSALVE ; 09/05/2019 ; [facility] BRENT MONSALVE ; 09/05/2019 ; [facility]

## 2019-08-22 NOTE — PROGRESS NOTE ADULT - SUBJECTIVE AND OBJECTIVE BOX
Patient is a 68y old  Female who presents with a chief complaint of Lethargy, weakness (22 Aug 2019 08:03)      HPI:  pt feeling ok this AM  no significant melena, or hematochezia    PAST MEDICAL & SURGICAL HISTORY:  Dorsalgia of lumbar region: on pain medication /baclofen po and pump  Self-catheterizes urinary bladder  Anemia: chronic  Uveitis  Osteoporosis  PAD (peripheral artery disease)  Hematoma: spinal  September  treated  September 2018  Paraplegia: on wheelchair goes to physical therapy 2 x weekly  Aortic dissection, thoracic: Type A Repaired 2009  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2018  UTI (urinary tract infection): stable x 3 months  TIA (transient ischemic attack)  HTN (Hypertension): on meds  History of corneal transplant: left corneal transplant on 5/21/2018  Disorder of spine: unthetethering 2 x  Presence of IVC filter: 2014 ?  S/P aortic dissection repair: Type A Dissection repair /2009   descending aortic aneurysm repair 9/2016  H/O Spinal surgery: laminectomies 2014    MEDICATIONS  (STANDING):  atorvastatin 40 milliGRAM(s) Oral at bedtime  baclofen 20 milliGRAM(s) Oral two times a day  dextrose 5%. 1000 milliLiter(s) (50 mL/Hr) IV Continuous <Continuous>  DULoxetine 20 milliGRAM(s) Oral two times a day  gabapentin 600 milliGRAM(s) Oral three times a day  lidocaine   Patch 1 Patch Transdermal daily  metroNIDAZOLE  IVPB 500 milliGRAM(s) IV Intermittent every 8 hours  pantoprazole Infusion 8 mG/Hr (10 mL/Hr) IV Continuous <Continuous>  polyethylene glycol 3350 17 Gram(s) Oral two times a day  prednisoLONE acetate 1% Suspension 1 Drop(s) Left EYE four times a day  valACYclovir 1000 milliGRAM(s) Oral daily    MEDICATIONS  (PRN):  acetaminophen   Tablet .. 650 milliGRAM(s) Oral every 6 hours PRN Temp greater or equal to 38C (100.4F); MILD PAIN (1-3)  zolpidem 5 milliGRAM(s) Oral at bedtime PRN Insomnia    Allergies  No Known Allergies    REVIEW OF SYSTEMS:    CONSTITUTIONAL: No weakness, fevers or chills  RESPIRATORY: No cough, wheezing, hemoptysis; No shortness of breath  CARDIOVASCULAR: No chest pain or palpitations  GASTROINTESTINAL: as above  All other review of systems is negative unless indicated above.    Vital Signs Last 24 Hrs  T(C): 36.9 (22 Aug 2019 06:42), Max: 37 (22 Aug 2019 00:34)  T(F): 98.5 (22 Aug 2019 06:42), Max: 98.6 (22 Aug 2019 00:34)  HR: 69 (22 Aug 2019 05:00) (69 - 113)  BP: 158/62 (22 Aug 2019 05:00) (134/59 - 177/75)  BP(mean): 85 (22 Aug 2019 05:00) (78 - 115)  RR: 11 (22 Aug 2019 05:00) (11 - 19)  SpO2: 100% (22 Aug 2019 05:00) (98% - 100%)    PHYSICAL EXAM:  Constitutional: NAD  Respiratory: CTA  Cardiovascular: S1 and S2  Gastrointestinal: BS+, soft, NT/ND      LABS:                        7.1    10.73 )-----------( 265      ( 22 Aug 2019 06:20 )             22.6     08-22    140  |  109<H>  |  5<L>  ----------------------------<  113<H>  3.6   |  27  |  0.44<L>    Ca    8.5      22 Aug 2019 06:20  Phos  2.4     08-22  Mg     1.8     08-22            RADIOLOGY & ADDITIONAL STUDIES:

## 2019-08-22 NOTE — PROGRESS NOTE ADULT - ASSESSMENT
67 year old woman with the above complex past medical history admitted with severe anemia.  Work up in progress.  She has had labile BPs.  I agree with holding all BP medications for now.  Will obtain a TTE and follow up with the result.      8/21/19:  Recurrent GIB leading to hypotension but her cardiac status appears stable.  Currently hypertensive but I would not treat this at the present time for fear of another unstable event.  To get repeat EGD at the end of the week.    8/22/19:  Cardiac status remains stable.  Continue present management

## 2019-08-22 NOTE — DISCHARGE NOTE PROVIDER - NSDCCPCAREPLAN_GEN_ALL_CORE_FT
PRINCIPAL DISCHARGE DIAGNOSIS  Diagnosis: Ureteral calculus  Assessment and Plan of Treatment: You had sepsis due to ecoli/bacteroides uti due to obstructing stone and neurogenic bladder.  You had ureteral stent.   - You completed antibiotics.   - Follow up with Dr. Douglas - call office.   - Labs will be done at home on Friday and the repeat weekly.   - **follow up with your primary doc.      SECONDARY DISCHARGE DIAGNOSES  Diagnosis: Anemia  Assessment and Plan of Treatment: due to upper GI bleed.  You had endoscopies-  single localized, 10 mm non-bleeding erosion was found on the lesser curvature of the stomach  - You were transfused multiple units of blood.   - Hemoglobin stable at 9.   - **take protonix 40mg twice daily.   - You will have repeat labs at home on Friday and then weekly.   - Call Dr. Yoo's office to discuss further workup.   - **No aspirin or NSAIDs.

## 2019-08-22 NOTE — PROGRESS NOTE ADULT - SUBJECTIVE AND OBJECTIVE BOX
68 yo F admitted 8/12 due to lethargy, fever, anemia, with obstructing LEFT renal stone, UTI sepsis and Anemia - pt underwent placement of LEFT ureteral stent.  UCx (+) neisseria sicca and bacteroides  Patient was transfused PRBC due to initial anemia.  Pt had 2 episodes of hypotension, stool was guaiac positive, hgb dropped but no clear active bleeding and f/u CTA negative.    pt was transferred to CCU as hgb drop to 6 (from 9 on 8/17), and hypotension.  Improved - s/p transfusion.    PMH significant for: Repair of Aortic Dissection 2009 than 2014, Spinal Hematoma leading to Paraplegia 2015, HTN, chronic pain - has baclofen pump  self-catheterization with chronic UTIs, IVC filter    had EGD on 8/19 which demonstrated large clot in the stomach but could not identify and specific or active source of bleeding.    above d/w Dr Winn who has been caring for the patient.    8/20: hemodynamics reasonable - case d/w Dr Yoo following repeat EGD this morning - large gastric clot again identified but no active bleeding.  Pt subsequently allowed to emerge from sedation - AA and following commands appropriately.  Placed on PSV 10/5 40% and monitored for 30-45 min - doing well with good VT and RR and sat 100%.  Extubated to FM O2 and doing well so far.    8/21: hgb reasonably stable - no evidence for significant ongoing bleeding.  Hemodynamics and respiratory function reasonable.  Tolerating clear liquids without issue.     8/22: hgb mild drift down - again, no evidence for significant ongoing bleeding.  Hemodynamics and respiratory function reasonable.  Tolerating clear liquids without issue.           Allergies    No Known Allergies        ICU Vital Signs Last 24 Hrs  T(C): 36.9 (22 Aug 2019 06:42), Max: 37 (22 Aug 2019 00:34)  T(F): 98.5 (22 Aug 2019 06:42), Max: 98.6 (22 Aug 2019 00:34)  HR: 69 (22 Aug 2019 05:00) (69 - 113)  BP: 158/62 (22 Aug 2019 05:00) (134/59 - 177/75)  BP(mean): 85 (22 Aug 2019 05:00) (78 - 115)  RR: 11 (22 Aug 2019 05:00) (11 - 19)  SpO2: 100% (22 Aug 2019 05:00) (98% - 100%)          I&O's Summary    21 Aug 2019 07:01  -  22 Aug 2019 07:00  --------------------------------------------------------  IN: 2765 mL / OUT: 2420 mL / NET: 345 mL                              7.1    10.73 )-----------( 265      ( 22 Aug 2019 06:20 )             22.6       08-22    140  |  109<H>  |  5<L>  ----------------------------<  113<H>  3.6   |  27  |  0.44<L>    Ca    8.5      22 Aug 2019 06:20  Phos  2.4     08-22  Mg     1.8     08-22      MEDICATIONS  (STANDING):  atorvastatin 40 milliGRAM(s) Oral at bedtime  baclofen 20 milliGRAM(s) Oral two times a day  dextrose 5%. 1000 milliLiter(s) (50 mL/Hr) IV Continuous <Continuous>  DULoxetine 20 milliGRAM(s) Oral two times a day  gabapentin 600 milliGRAM(s) Oral three times a day  lidocaine   Patch 1 Patch Transdermal daily  metroNIDAZOLE  IVPB 500 milliGRAM(s) IV Intermittent every 8 hours  pantoprazole Infusion 8 mG/Hr (10 mL/Hr) IV Continuous <Continuous>  polyethylene glycol 3350 17 Gram(s) Oral two times a day  prednisoLONE acetate 1% Suspension 1 Drop(s) Left EYE four times a day  sodium chloride 0.45%. 1000 milliLiter(s) (50 mL/Hr) IV Continuous <Continuous>  valACYclovir 1000 milliGRAM(s) Oral daily    MEDICATIONS  (PRN):  acetaminophen   Tablet .. 650 milliGRAM(s) Oral every 6 hours PRN Temp greater or equal to 38C (100.4F); MILD PAIN (1-3)  zolpidem 5 milliGRAM(s) Oral at bedtime PRN Insomnia        Review of Systems:   · Negative General Symptoms	no fever; no chills	  · Negative Skin Symptoms	no rash; no itching	  · Negative Ophthalmologic Symptoms	no diplopia; no photophobia	  · Negative ENMT Symptoms	no dysphagia	  · Negative Respiratory and Thorax Symptoms	no wheezing; no dyspnea; no cough	  · Negative Cardiovascular Symptoms	no chest pain; no palpitations	  · Negative Gastrointestinal Symptoms	no nausea; no vomiting	  · Gastrointestinal Symptoms	melena	  · Negative General Genitourinary Symptoms	no dysuria	  · Musculoskeletal Comments	Chronic LE plegia	  · Negative Neurological Symptoms	no headache; no loss of consciousness	  · Negative Psychiatric Symptoms	no suicidal ideation; no depression; no anxiety	  · Negative Hematology Symptoms	no gum bleeding; no nose bleeding	  · Negative Allergic Reactions	no urticaria	  · Additional ROS	ALL other ROS are NEGATIVE.	    Physical Exam:   · Constitutional	detailed exam	  · Constitutional Details	no distress	  · Eyes	detailed exam	  · Eyes Details	PERRL; EOMI	  · ENMT	detailed exam	  · ENMT Details	mouth	  · Mouth	moist	  · Neck	detailed exam 	  · Neck Details	supple; no JVD	  · Back	detailed exam 	  · Back Details	normal shape	  · Respiratory	detailed exam	  · Respiratory Details	airway patent; breath sounds equal; good air movement	  · Cardiovascular	detailed exam	  · Cardiovascular Details	regular rate and rhythm 	  · Gastrointestinal	detailed exam	  · GI Normal	soft; nontender; no distention; bowel sounds normal	  · Extremities	detailed exam 	  · Extremities Details	no cyanosis; no pedal edema	  · Vascular	Equal and normal pulses (carotid, femoral, dorsalis pedis) 	  · Neurological	detailed exam	  · Neurological Details	alert and oriented x 3	  · Motor	B/L LE plegia, B/L LE contractures	  · Skin	detailed exam	  · Skin Details	warm and dry	  · Musculoskeletal	detailed exam	  · Musculoskeletal Comments	good strength in UE's B/L	  · Psychiatric	detailed exam	  · Psychiatric Details	normal affect; normal behavior	      DVT Prophylaxis: venodynes - no chemoprophylaxis due to GI bleeding, anemia due to acute hemorrhage.    Visit Information: SSQ    ** Time is exclusive of billed procedures and/or teaching and/or routine family updates.

## 2019-08-22 NOTE — PROGRESS NOTE ADULT - SUBJECTIVE AND OBJECTIVE BOX
CHIEF COMPLAINT: Patient is a 68y old  Female who presents with a chief complaint of Lethargy, weakness (17 Aug 2019 19:33)      HPI: HPI:  This patient is a 67 year old female with PMH signficiant for:                   Repair of Aortic Dissection 2009 than 2014                   Spinal Hematoma leading to Paraplegia 2015                   HTN                   chronic pain - has baclofen pump                   self catheterization with chronic UTIs                  ? ivc filter  In January patient had hgb of 10.8 but low mcv,    Today she presents with weakness over a week , so bad that today she could barely sit up.  In ED she was found to have hgb of 4.3, with dec mcv, no dark stools, no hx. GI bleeding,  ct done in ED pending  also pyuria, likely UTI  Sinus tach to 150 (12 Aug 2019 03:20)    8/21/19:  Recent events noted with apparent recurrent GI bleeding and hypotension/tachykardia.  Now in CCU.       PMHx: PAST MEDICAL & SURGICAL HISTORY:  Dorsalgia of lumbar region: on pain medication /baclofen po and pump  Self-catheterizes urinary bladder  Anemia: chronic  Uveitis  Osteoporosis  PAD (peripheral artery disease)  Hematoma: spinal  September  treated  September 2018  Paraplegia: on wheelchair goes to physical therapy 2 x weekly  Aortic dissection, thoracic: Type A Repaired 2009  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2018  UTI (urinary tract infection): stable x 3 months  TIA (transient ischemic attack)  HTN (Hypertension): on meds  History of corneal transplant: left corneal transplant on 5/21/2018  Disorder of spine: unthetethering 2 x  Presence of IVC filter: 2014 ?  S/P aortic dissection repair: Type A Dissection repair /2009   descending aortic aneurysm repair 9/2016  H/O Spinal surgery: laminectomies 2014        Soc Hx:     FAMILY HISTORY:  Family history of Alzheimer's disease      Allergies: Allergies    No Known Allergies    Intolerances          REVIEW OF SYSTEMS:    As above  No chest pain or shortness of breath  No lightheadeness or syncope  No leg swelling  No palpitations  No claudication-like symptoms    ICU Vital Signs Last 24 Hrs  T(C): 36.9 (22 Aug 2019 06:42), Max: 37 (22 Aug 2019 00:34)  T(F): 98.5 (22 Aug 2019 06:42), Max: 98.6 (22 Aug 2019 00:34)  HR: 69 (22 Aug 2019 05:00) (69 - 113)  BP: 158/62 (22 Aug 2019 05:00) (134/59 - 177/75)  BP(mean): 85 (22 Aug 2019 05:00) (78 - 115)  ABP: --  ABP(mean): --  RR: 11 (22 Aug 2019 05:00) (11 - 19)  SpO2: 100% (22 Aug 2019 05:00) (98% - 100%)        I&O's Summary    17 Aug 2019 07:01  -  18 Aug 2019 07:00  --------------------------------------------------------  IN: 480 mL / OUT: 0 mL / NET: 480 mL    18 Aug 2019 07:01  -  18 Aug 2019 11:54  --------------------------------------------------------  IN: 480 mL / OUT: 0 mL / NET: 480 mL        CAPILLARY BLOOD GLUCOSE      POCT Blood Glucose.: 174 mg/dL (17 Aug 2019 17:50)      PHYSICAL EXAM:   Patient in NAD  Neck: No JVD; Carotids:  2+ without bruits  Respiratory:  Clear to A&P  Cardiovascular: S1 and S2, regular rate and rhythm, no Murmurs, gallops or rubs  Gastrointestinal:  Soft, non-tender; BS positive  Extremities: No peripheral edema  Vascular: 2+ peripheral pulses  Neurological: A/O x 3, no focal deficits      MEDICATIONS:  MEDICATIONS  (STANDING):  artificial  tears Solution 1 Drop(s) Left EYE three times a day  atorvastatin 40 milliGRAM(s) Oral at bedtime  baclofen 20 milliGRAM(s) Oral two times a day  cefTRIAXone Injectable. 1000 milliGRAM(s) IV Push every 24 hours  DULoxetine 20 milliGRAM(s) Oral two times a day  gabapentin 600 milliGRAM(s) Oral three times a day  lidocaine   Patch 1 Patch Transdermal daily  metroNIDAZOLE    Tablet 500 milliGRAM(s) Oral every 8 hours  polyethylene glycol 3350 17 Gram(s) Oral two times a day  polyethylene glycol/electrolyte Solution. 4000 milliLiter(s) Oral once  prednisoLONE acetate 1% Suspension 1 Drop(s) Left EYE four times a day  valACYclovir 1000 milliGRAM(s) Oral daily      LABS: All Labs Reviewed:  Blood Culture: Organism --  Gram Stain Blood -- Gram Stain --  Specimen Source .Urine Catheterized  Culture-Blood --    Organism --  Gram Stain Blood -- Gram Stain --  Specimen Source .Blood None  Culture-Blood --      BNP   CBC             WBC Count: 9.75 K/uL (08-18 @ 06:54)  WBC Count: 11.12 K/uL (08-17 @ 18:10)              Hemoglobin: 7.4 g/dL (08-18 @ 06:54)  Hemoglobin: 7.7 g/dL (08-17 @ 18:10)  Hemoglobin: 9.0 g/dL (08-17 @ 07:34)  Hemoglobin: 8.3 g/dL (08-16 @ 08:08)  Hemoglobin: 7.5 g/dL (08-15 @ 07:38)  Hemoglobin: 6.8 g/dL (08-14 @ 06:19)              Hematocrit: 24.8 % (08-18 @ 06:54)  Hematocrit: 24.9 % (08-17 @ 18:10)  Hematocrit: 30.0 % (08-17 @ 07:34)  Hematocrit: 27.1 % (08-16 @ 08:08)  Hematocrit: 24.5 % (08-15 @ 07:38)  Hematocrit: 21.9 % (08-14 @ 06:19)              Mean Cell Volume: 82.4 fl (08-18 @ 06:54)  Mean Cell Volume: 81.9 fl (08-17 @ 18:10)  Mean Cell Volume: 82.0 fl (08-17 @ 07:34)  Mean Cell Volume: 81.1 fl (08-16 @ 08:08)  Mean Cell Volume: 80.1 fl (08-15 @ 07:38)  Mean Cell Volume: 79.3 fl (08-14 @ 06:19)              Platelet Count - Automated: 390 K/uL (08-18 @ 06:54)  Platelet Count - Automated: 364 K/uL (08-17 @ 18:10)  Platelet Count - Automated: 279 K/uL (08-17 @ 07:34)  Platelet Count - Automated: 262 K/uL (08-16 @ 08:08)  Platelet Count - Automated: 191 K/uL (08-15 @ 07:38)  Platelet Count - Automated: 215 K/uL (08-14 @ 06:19)                        7.8    14.46 )-----------( 304      ( 21 Aug 2019 06:25 )             24.1                               Cardiac markers                                        Chems        Sodium, Serum: 141 mmol/L (08-18 @ 06:54)  Sodium, Serum: 140 mmol/L (08-17 @ 07:34)  Sodium, Serum: 142 mmol/L (08-16 @ 08:08)  Sodium, Serum: 145 mmol/L (08-15 @ 07:38)  Sodium, Serum: 146 mmol/L (08-14 @ 06:19)          Potassium, Serum: 4.2 mmol/L (08-18 @ 06:54)  Potassium, Serum: 3.6 mmol/L (08-17 @ 07:34)  Potassium, Serum: 3.9 mmol/L (08-16 @ 08:08)  Potassium, Serum: 4.0 mmol/L (08-15 @ 07:38)  Potassium, Serum: 3.8 mmol/L (08-14 @ 06:19)          Blood Urea Nitrogen, Serum: 26 mg/dL (08-18 @ 06:54)  Blood Urea Nitrogen, Serum: 10 mg/dL (08-17 @ 07:34)  Blood Urea Nitrogen, Serum: 11 mg/dL (08-16 @ 08:08)  Blood Urea Nitrogen, Serum: 14 mg/dL (08-15 @ 07:38)  Blood Urea Nitrogen, Serum: 22 mg/dL (08-14 @ 06:19)          Creatinine 0.56  Creatinine 0.52  Creatinine 0.57  Creatinine 0.54  Creatinine 0.56          Magnesium, Serum: 3.1 mg/dL (08-18 @ 06:54)  Magnesium, Serum: 2.1 mg/dL (08-17 @ 07:34)  Magnesium, Serum: 2.1 mg/dL (08-16 @ 08:08)  Magnesium, Serum: 2.1 mg/dL (08-15 @ 07:38)                          Calcium, Total Serum: 8.7 mg/dL (08-18 @ 06:54)  Calcium, Total Serum: 8.9 mg/dL (08-17 @ 07:34)  Calcium, Total Serum: 8.8 mg/dL (08-16 @ 08:08)  Calcium, Total Serum: 8.7 mg/dL (08-15 @ 07:38)  Calcium, Total Serum: 8.9 mg/dL (08-14 @ 06:19)    08-21    146<H>  |  114<H>  |  13  ----------------------------<  94  3.3<L>   |  27  |  0.40<L>    Ca    8.0<L>      21 Aug 2019 06:25  Phos  2.7     08-21  Mg     1.8     08-21                                                      RADIOLOGY: < from: CT Angio Chest Dissection Protocol (08.12.19 @ 02:53) >  7 x 11 mm calcified stone in themid left ureter creating a moderate left   hydronephrosis and a delayed nephrogram.  Mild wall thickening and   enhancement   of the left renal pelvis and proximal ureter could indicate an underlying   infection/UTI.    < end of copied text >  < from: CT Angio Chest Dissection Protocol (08.12.19 @ 02:53) >   Aneurysmal change of the abdominal aorta measuring 4.1   cm, no rupture.      < end of copied text >  < from: CT Angio Chest Dissection Protocol (08.12.19 @ 02:53) >  Stable appearance of thesurgically repaired thoracic aorta.  Stable appearance of the abdominal aorta.    Tree-in-bud opacities throughout both lungs are more pronounced on the   current exam.    Patchy opacities and solid appearing nodules in both lungs, measuring up   to 9 mm. Some of which are new and some have increased in size in the   interim.    1.2 cm obstructing calculus in the proximal left ureter with   mild-to-moderate hydronephrosis and probable underlying pyelonephritis.   Clinical correlation is recommended.    < end of copied text >      EKG: NSR; IRBBB    Telemetry:  Sinus    ECHO:  Normal overall LV systolic function; status post aneurysm repair; mildly dilated RA/RV; mild valvular abnormalites

## 2019-08-22 NOTE — DISCHARGE NOTE PROVIDER - CARE PROVIDER_API CALL
Jorge Lares)  Urology  [address], [address]  [place] [address]  Phone: [phone]  Fax: [phone]  Follow Up Time:     Rodri Yoo)  Gastroenterology; Internal Medicine  [address], [facility] [place] [address]  Phone: [phone]  Fax: [phone]  Follow Up Time:

## 2019-08-22 NOTE — PROGRESS NOTE ADULT - ASSESSMENT
69yo female with multiple medical problems with recent UGI bleed  bleeding seems to have stopped though h/h decreased today, likely just variation  advance diet today and then npo after midnight for egd tomorrow  maintain protonix drip for now  d/w pt in detail

## 2019-08-23 NOTE — PROGRESS NOTE ADULT - ASSESSMENT
Urosepsis 2/2 left ureteral stone with hydronephrosis, neurogenic bladder  s/p cystoscopy, left ureteral stent placement 8/12    Plan:    -currently scheduled for OR on Monday, 8/26 for left ureteroscopy, laser lithotripsy, stone extraction, stent exchange. She understands that we will only proceed if GI eval is complete by Monday as that is more pressing of an issue for her at this time. alternatively, she can also follow up as outpatient for surgery if she wishes as there is currently no  contraindication to being discharge home.   -if she wishes to remain in house until Monday for surgery - please restart ceftriaxone 1g daily on 8/24/19 in light of upcoming surgery and recent urosepsis  -continue CIC q4-6h    Thank you for allowing me to assist in the care of this patient  Please feel free to call with any questions/concerns    Jorge Lares MD  [facility]  W: [phone]

## 2019-08-23 NOTE — PROGRESS NOTE ADULT - SUBJECTIVE AND OBJECTIVE BOX
Patient is a 68 y/o/f  admitted 8/12 due to lethargy, fever, anemia, with obstructing LEFT renal stone, UTI sepsis and Anemia - pt underwent placement of LEFT ureteral stent. UCx (+) neisseria sicca and bacteroides  In the hospital, patient had episode of low BPs with + stool guiac. , hgb dropped but no clear active bleeding and f/u CTA negative. Subsequently patient transferred in the CCU for drop to 6 (from 9 on 8/17). Patient underwent EGD on 8/19 which demonstrated large clot in the stomach but could not identify and specific or active source of bleeding.    8/20:  repeat EGD this morning - large gastric clot again identified but no active bleeding.    8/21: hgb reasonably stable - no evidence for significant ongoing bleeding.  Hemodynamics and respiratory function reasonable.  Tolerating clear liquids without issue.     8/23: Comfortable. tolerating po diet. Denies any HA, CP, SOB. Comfortable.       PMH significant for: Repair of Aortic Dissection 2009 than 2014, Spinal Hematoma leading to Paraplegia 2015, HTN, chronic pain - has baclofen pump  self-catheterization with chronic UTIs, IVC filter    REVIEW OF SYSTEMS:  General: NAD, hemodynamically stable   HEENT:  Eyes:  No visual loss, blurred vision, double vision or yellow sclerae. Ears, Nose, Throat:  No hearing loss, sneezing, congestion, runny nose or sore throat.  SKIN:  No rash or itching.  CARDIOVASCULAR:  No chest pain,   RESPIRATORY:  No shortness of breath, cough or sputum.  GASTROINTESTINAL:  No anorexia, nausea, vomiting or diarrhea. No abdominal pain or blood.  NEUROLOGICAL:  No headache, dizziness, syncope, paralysis, ataxia, numbness or tingling in the extremities. No change in bowel or bladder control.  MUSCULOSKELETAL:  No muscle, back pain, joint pain or stiffness.  HEMATOLOGIC:  Anemia  LYMPHATICS:  No enlarged nodes. No history of splenectomy.  ENDOCRINOLOGIC:  No reports of sweating, cold or heat intolerance. No polyuria or polydipsia.  ALLERGIES:  No history of asthma, hives, eczema or rhinitis.    Physical Exam:   GENERAL APPEARANCE:  NAD, hemodynamically stable  T(C): 37.2 (08-23-19 @ 11:13), Max: 37.2 (08-23-19 @ 11:13)  HR: 83 (08-23-19 @ 11:13) (83 - 104)  BP: 170/81 (08-23-19 @ 11:13) (154/75 - 170/81)  RR: 17 (08-23-19 @ 11:13) (17 - 18)  SpO2: 94% (08-23-19 @ 11:13) (94% - 95%)  Wt(kg): --  HEENT:  Head is normocephalic    Skin:  Warm and dry without any rash   NECK:  Supple without lymphadenopathy.   HEART:  Regular rate and rhythm. normal S1 and S2, No M/R/G  LUNGS:  Good ins/exp effort, no W/R/R/C  ABDOMEN:  Soft, nontender, nondistended with good bowel sounds heard  EXTREMITIES:  Without cyanosis, clubbing or edema.   NEUROLOGICAL:  Gross nonfocal     Labs:   CBC Full  -  ( 23 Aug 2019 08:20 )  WBC Count : 11.09 K/uL  RBC Count : 2.70 M/uL  Hemoglobin : 7.8 g/dL  Hematocrit : 24.6 %  Platelet Count - Automated : 352 K/uL    146<H>  |  112<H>  |  3<L>  ----------------------------<  105<H>  3.2<L>   |  28  |  0.39<L>    Ca    8.0<L>      23 Aug 2019 08:20  Phos  2.4     08-23  Mg     1.6     08-23        # Acute blood loss anemia secondary to GI bleed  - s/p EGD 8/23 A single localized, 10 mm non-bleeding erosion was found on the lesser curvature of the stomach  - currently HH 7.8  - 2 peripheral bore IVs in place  - Active type and screen available  - continue to monitor CBC  - Protonix 40 mg po BID    # Sepsis from obstructing kidney stone now s/p ureteral stent  - WILL discuss with urology restarting rocephin prior to monday's procedure  - s/p zosyn #5, s/p Rocephin 1gm daily #5  - currently scheduled for OR on Monday, 8/26 for left ureteroscopy, laser lithotripsy, stone extraction, stent exchange  - Urine cultures:  E. coli /  Bacteroides /  Neisseria species  - on flagyl for bacteroides coverage #7/7  - had initial blood cx growing staph hominis - considered containment    # Acute respiratory failure  - Intubated for protection of airway on 8/19 - extubated 8/20    # Hypokalemia  - repleted

## 2019-08-23 NOTE — PROGRESS NOTE ADULT - SUBJECTIVE AND OBJECTIVE BOX
Patient seen and examined at bedside  Pending repeat endoscopy today at 3pm  No  complaints  No f/c/n/v  No hematuria, dysuria    Medications  acetaminophen   Tablet .. 650 milliGRAM(s) Oral every 6 hours PRN  atorvastatin 40 milliGRAM(s) Oral at bedtime  baclofen 20 milliGRAM(s) Oral two times a day  dextrose 5%. 1000 milliLiter(s) IV Continuous <Continuous>  DULoxetine 20 milliGRAM(s) Oral two times a day  gabapentin 600 milliGRAM(s) Oral three times a day  lidocaine   Patch 1 Patch Transdermal daily  metroNIDAZOLE  IVPB 500 milliGRAM(s) IV Intermittent every 8 hours  oxyCODONE    5 mG/acetaminophen 325 mG 1 Tablet(s) Oral every 6 hours PRN  oxyCODONE    5 mG/acetaminophen 325 mG 2 Tablet(s) Oral every 6 hours PRN  pantoprazole Infusion 8 mG/Hr IV Continuous <Continuous>  polyethylene glycol 3350 17 Gram(s) Oral two times a day  prednisoLONE acetate 1% Suspension 1 Drop(s) Left EYE four times a day  sodium chloride 0.45%. 1000 milliLiter(s) IV Continuous <Continuous>  valACYclovir 1000 milliGRAM(s) Oral daily  zolpidem 5 milliGRAM(s) Oral at bedtime PRN      ROS  General: No  fevers, chills, night sweats, fatigue, malaise  Ophthalmologic: No eye pain,  swelling  ENMT: No hearing changes,  ear pain,  nasal congestion  Respiratory and Thorax: No cough, sputum, dyspnea, wheezing  Cardiovascular: No chest pain, SOB, PND, dyspnea on exertion, orthopnea  Genitourinary: see above  Neurological:	 No  syncope, loss of consciousness, headache, dizziness  Psychiatric: No SI/HI/AH/VH.  Hematology/Lymphatics:	No bruising, lymphadenopathy    Vital Signs Last 24 Hrs  T(C): 37.2 (23 Aug 2019 11:13), Max: 37.2 (23 Aug 2019 11:13)  T(F): 98.9 (23 Aug 2019 11:13), Max: 98.9 (23 Aug 2019 11:13)  HR: 83 (23 Aug 2019 11:13) (83 - 104)  BP: 170/81 (23 Aug 2019 11:13) (144/112 - 170/81)  BP(mean): 118 (22 Aug 2019 14:00) (118 - 118)  RR: 17 (23 Aug 2019 11:13) (17 - 18)  SpO2: 94% (23 Aug 2019 11:13) (83% - 95%)    PHYSICAL EXAM:  Constitutional:  NAD, lying in bed comfortably   Eyes: EOMI, vision is grossly intact  Neck: neck supple  Back: no CVA tenderness bilaterally  Respiratory: no labored breathing  Cardiovascular: no peripheral edema, cyanosis, or pallor. Extremities are warm and well perfused.   Gastrointestinal: soft, non-tender, non-distended, no guarding, no rebound tenderness. Bladder non-palpable  Neurological: A&O x3  Psychiatric: normal mood and affect          I/O    08-22-19 @ 07:01  -  08-23-19 @ 07:00  --------------------------------------------------------  IN: 0 mL / OUT: 2400 mL / NET: -2400 mL        Labs:  CBC Full  -  ( 23 Aug 2019 08:20 )  WBC Count : 11.09 K/uL  Hemoglobin : 7.8 g/dL  Hematocrit : 24.6 %  Platelet Count - Automated : 352 K/uL  Mean Cell Volume : 91.1 fl  Mean Cell Hemoglobin : 28.9 pg  Mean Cell Hemoglobin Concentration : 31.7 gm/dL  Auto Neutrophil # : x  Auto Lymphocyte # : x  Auto Monocyte # : x  Auto Eosinophil # : x  Auto Basophil # : x  Auto Neutrophil % : x  Auto Lymphocyte % : x  Auto Monocyte % : x  Auto Eosinophil % : x  Auto Basophil % : x    08-23    146<H>  |  112<H>  |  3<L>  ----------------------------<  105<H>  3.2<L>   |  28  |  0.39<L>    Ca    8.0<L>      23 Aug 2019 08:20  Phos  2.4     08-23  Mg     1.6     08-23

## 2019-08-24 NOTE — PROGRESS NOTE ADULT - SUBJECTIVE AND OBJECTIVE BOX
Patient is a 68y old  Female who presents with a chief complaint of Lethargy, weakness (23 Aug 2019 16:44)      HPI:  pt feeling ok this AM  no new complaints    PAST MEDICAL & SURGICAL HISTORY:  Dorsalgia of lumbar region: on pain medication /baclofen po and pump  Self-catheterizes urinary bladder  Anemia: chronic  Uveitis  Osteoporosis  PAD (peripheral artery disease)  Hematoma: spinal  September  treated  September 2018  Paraplegia: on wheelchair goes to physical therapy 2 x weekly  Aortic dissection, thoracic: Type A Repaired 2009  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2018  UTI (urinary tract infection): stable x 3 months  TIA (transient ischemic attack)  HTN (Hypertension): on meds  History of corneal transplant: left corneal transplant on 5/21/2018  Disorder of spine: unthetethering 2 x  Presence of IVC filter: 2014 ?  S/P aortic dissection repair: Type A Dissection repair /2009   descending aortic aneurysm repair 9/2016  H/O Spinal surgery: laminectomies 2014    MEDICATIONS  (STANDING):  atorvastatin 40 milliGRAM(s) Oral at bedtime  baclofen 20 milliGRAM(s) Oral two times a day  carbamide peroxide Otic Solution 10 Drop(s) Left Ear <User Schedule>  dextrose 5%. 1000 milliLiter(s) (50 mL/Hr) IV Continuous <Continuous>  DULoxetine 20 milliGRAM(s) Oral two times a day  gabapentin 600 milliGRAM(s) Oral three times a day  lidocaine   Patch 1 Patch Transdermal daily  metroNIDAZOLE  IVPB 500 milliGRAM(s) IV Intermittent every 8 hours  pantoprazole    Tablet 40 milliGRAM(s) Oral two times a day  polyethylene glycol 3350 17 Gram(s) Oral two times a day  prednisoLONE acetate 1% Suspension 1 Drop(s) Left EYE four times a day  sodium chloride 0.45%. 1000 milliLiter(s) (50 mL/Hr) IV Continuous <Continuous>  valACYclovir 1000 milliGRAM(s) Oral daily    MEDICATIONS  (PRN):  acetaminophen   Tablet .. 650 milliGRAM(s) Oral every 6 hours PRN Temp greater or equal to 38C (100.4F); MILD PAIN (1-3)  oxyCODONE    5 mG/acetaminophen 325 mG 1 Tablet(s) Oral every 6 hours PRN Moderate Pain (4 - 6)  oxyCODONE    5 mG/acetaminophen 325 mG 2 Tablet(s) Oral every 6 hours PRN Severe Pain (7 - 10)  zolpidem 5 milliGRAM(s) Oral at bedtime PRN Insomnia    Allergies  No Known Allergies    REVIEW OF SYSTEMS:    CONSTITUTIONAL: No weakness, fevers or chills  RESPIRATORY: No cough, wheezing, hemoptysis; No shortness of breath  CARDIOVASCULAR: No chest pain or palpitations  GASTROINTESTINAL: No abdominal or epigastric pain. No nausea, vomiting, or hematemesis; No diarrhea or constipation. No melena or hematochezia.  All other review of systems is negative unless indicated above.    Vital Signs Last 24 Hrs  T(C): 36.7 (24 Aug 2019 05:20), Max: 37.2 (23 Aug 2019 11:13)  T(F): 98 (24 Aug 2019 05:20), Max: 98.9 (23 Aug 2019 11:13)  HR: 94 (24 Aug 2019 05:20) (83 - 94)  BP: 171/79 (24 Aug 2019 05:20) (168/83 - 172/74)  BP(mean): --  RR: 18 (24 Aug 2019 05:20) (17 - 18)  SpO2: 93% (24 Aug 2019 05:20) (93% - 97%)    PHYSICAL EXAM:    Constitutional: NAD  Respiratory: CTAB  Cardiovascular: S1 and S2, RRR, no M/G/R  Gastrointestinal: BS+, soft, NT/ND      LABS:                        7.8    11.09 )-----------( 352      ( 23 Aug 2019 08:20 )             24.6     08-23    146<H>  |  112<H>  |  3<L>  ----------------------------<  105<H>  3.2<L>   |  28  |  0.39<L>    Ca    8.0<L>      23 Aug 2019 08:20  Phos  2.4     08-23  Mg     1.6     08-23            RADIOLOGY & ADDITIONAL STUDIES:

## 2019-08-24 NOTE — PROGRESS NOTE ADULT - SUBJECTIVE AND OBJECTIVE BOX
Patient is a 68 y/o/f  admitted 8/12 due to lethargy, fever, anemia, with obstructing LEFT renal stone, UTI sepsis and Anemia - pt underwent placement of LEFT ureteral stent. UCx (+) neisseria sicca and bacteroides  In the hospital, patient had episode of low BPs with + stool guiac. , hgb dropped but no clear active bleeding and f/u CTA negative. Subsequently patient transferred in the CCU for drop to 6 (from 9 on 8/17). Patient underwent EGD on 8/19 which demonstrated large clot in the stomach but could not identify and specific or active source of bleeding.    8/20:  repeat EGD this morning - large gastric clot again identified but no active bleeding.  8/23: s/p EGD x 3  8/24: Hospital course reviewed. Patient with complicated medical history. Patient's bleeding site has been identified 8/23. Patient is anticipated to have an urological procedure on Monday. Denies any HA, CP, SOB. No fevers, chills or shakes. Comfortable.       PMH significant for: Repair of Aortic Dissection 2009 than 2014, Spinal Hematoma leading to Paraplegia 2015, HTN, chronic pain - has baclofen pump  self-catheterization with chronic UTIs, IVC filter    REVIEW OF SYSTEMS:  General: NAD, hemodynamically stable   HEENT:  Eyes:  No visual loss, blurred vision, double vision or yellow sclerae. Ears, Nose, Throat:  No hearing loss, sneezing, congestion, runny nose or sore throat.  SKIN:  No rash or itching.  CARDIOVASCULAR:  No chest pain,   RESPIRATORY:  No shortness of breath, cough or sputum.  GASTROINTESTINAL:  No anorexia, nausea, vomiting or diarrhea. No abdominal pain or blood.  NEUROLOGICAL:  No headache, dizziness, syncope, paralysis, ataxia, numbness or tingling in the extremities. No change in bowel or bladder control.  MUSCULOSKELETAL:  No muscle, back pain, joint pain or stiffness.  HEMATOLOGIC:  Anemia  LYMPHATICS:  No enlarged nodes. No history of splenectomy.  ENDOCRINOLOGIC:  No reports of sweating, cold or heat intolerance. No polyuria or polydipsia.  ALLERGIES:  No history of asthma, hives, eczema or rhinitis.    Physical Exam:   GENERAL APPEARANCE:  NAD, hemodynamically stable  T(C): 36.6 (24 Aug 2019 11:21), Max: 36.7 (24 Aug 2019 05:20)  T(F): 97.9 (24 Aug 2019 11:21), Max: 98 (24 Aug 2019 05:20)  HR: 91 (24 Aug 2019 11:21) (85 - 94)  BP: 155/78 (24 Aug 2019 11:21) (155/78 - 172/74)  RR: 20 (24 Aug 2019 11:21) (17 - 20)  SpO2: 95% (24 Aug 2019 11:21) (93% - 97%)  HEENT:  Head is normocephalic    Skin:  Warm and dry without any rash   NECK:  Supple without lymphadenopathy.   HEART:  Regular rate and rhythm. normal S1 and S2, No M/R/G  LUNGS:  Good ins/exp effort, no W/R/R/C  ABDOMEN:  Soft, nontender, nondistended with good bowel sounds heard  EXTREMITIES:  Without cyanosis, clubbing or edema.   NEUROLOGICAL:  Gross nonfocal     Labs:     CBC Full  -  ( 23 Aug 2019 08:20 )  WBC Count : 11.09 K/uL  RBC Count : 2.70 M/uL  Hemoglobin : 7.8 g/dL  Hematocrit : 24.6 %  Platelet Count - Automated : 352 K/uL    146<H>  |  112<H>  |  3<L>  ----------------------------<  105<H>  3.2<L>   |  28  |  0.39<L>    Ca    8.0<L>      23 Aug 2019 08:20  Phos  2.4     08-23  Mg     1.6     08-23    # Acute blood loss anemia secondary to GI bleed  - s/p EGD 8/23 A single localized, 10 mm non-bleeding erosion was found on the lesser curvature of the stomach  - repeat EGD w/ EUS to look for underlying vessel in 4-6 weeks  - currently HH 7s, close monitoring advice  - 2 peripheral bore IVs in place  - Active type and screen available  - continue to monitor CBC  - Protonix 40 mg po BID  - Patient being followed by Dr. Majano    # Sepsis from obstructing kidney stone now s/p ureteral stent  - restart Rocephin 1gram IV daily 8/24 as requested by urology  - on flagyl / bacteroides coverage #7/7  - scheduled for OR on Monday 8/26 for left ureteroscopy, laser lithotripsy, stone extraction, stent exchange  - Urine cultures:  E. coli /  Bacteroides /  Neisseria species  - had initial blood cx growing staph hominis - considered containment    # Acute respiratory failure  - Intubated for protection of airway on 8/19 - extubated 8/20    # Hypokalemia  - replete Patient is a 66 y/o/f  admitted 8/12 due to lethargy, fever, anemia, with obstructing LEFT renal stone, UTI sepsis and Anemia - pt underwent placement of LEFT ureteral stent. UCx (+) neisseria sicca and bacteroides  In the hospital, patient had episode of low BPs with + stool guiac. , hgb dropped but no clear active bleeding and f/u CTA negative. Subsequently patient transferred in the CCU for drop to 6 (from 9 on 8/17). Patient underwent EGD on 8/19 which demonstrated large clot in the stomach but could not identify and specific or active source of bleeding.    8/20:  repeat EGD this morning - large gastric clot again identified but no active bleeding.  8/23: s/p EGD x 3  8/24: Hospital course reviewed. Patient with complicated medical history. Patient's bleeding site has been identified 8/23. Patient is anticipated to have an urological procedure on Monday. Denies any HA, CP, SOB. No fevers, chills or shakes. Comfortable.     Addendum: Patient was noting of (L) sided hearing problem, such as she might be having a congestion with the wax. I looked in the ear, ear was full of wax. I cleaned the wax - patient had erythematous temponinc membrane @ 5 pm location and fluid behind the tympanic membrane suspicious of otitis media. Patient does not have any pain but fullness.       REVIEW OF SYSTEMS:  General: NAD, hemodynamically stable   HEENT:  Eyes:  No visual loss, blurred vision, double vision or yellow sclerae. Ears, Nose, Throat:  No hearing loss, sneezing, congestion, runny nose or sore throat.  SKIN:  No rash or itching.  CARDIOVASCULAR:  No chest pain,   RESPIRATORY:  No shortness of breath, cough or sputum.  GASTROINTESTINAL:  No anorexia, nausea, vomiting or diarrhea. No abdominal pain or blood.  NEUROLOGICAL:  No headache, dizziness, syncope, paralysis, ataxia, numbness or tingling in the extremities. No change in bowel or bladder control.  MUSCULOSKELETAL:  No muscle, back pain, joint pain or stiffness.  HEMATOLOGIC:  Anemia  LYMPHATICS:  No enlarged nodes. No history of splenectomy.  ENDOCRINOLOGIC:  No reports of sweating, cold or heat intolerance. No polyuria or polydipsia.  ALLERGIES:  No history of asthma, hives, eczema or rhinitis.    Physical Exam:   GENERAL APPEARANCE:  NAD, hemodynamically stable  T(C): 36.6 (24 Aug 2019 11:21), Max: 36.7 (24 Aug 2019 05:20)  T(F): 97.9 (24 Aug 2019 11:21), Max: 98 (24 Aug 2019 05:20)  HR: 91 (24 Aug 2019 11:21) (85 - 94)  BP: 155/78 (24 Aug 2019 11:21) (155/78 - 172/74)  RR: 20 (24 Aug 2019 11:21) (17 - 20)  SpO2: 95% (24 Aug 2019 11:21) (93% - 97%)  HEENT:  Head is normocephalic    Skin:  Warm and dry without any rash   NECK:  Supple without lymphadenopathy.   HEART:  Regular rate and rhythm. normal S1 and S2, No M/R/G  LUNGS:  Good ins/exp effort, no W/R/R/C  ABDOMEN:  Soft, nontender, nondistended with good bowel sounds heard  EXTREMITIES:  Without cyanosis, clubbing or edema.   NEUROLOGICAL:  Gross nonfocal     Labs:     CBC Full  -  ( 23 Aug 2019 08:20 )  WBC Count : 11.09 K/uL  RBC Count : 2.70 M/uL  Hemoglobin : 7.8 g/dL  Hematocrit : 24.6 %  Platelet Count - Automated : 352 K/uL    146<H>  |  112<H>  |  3<L>  ----------------------------<  105<H>  3.2<L>   |  28  |  0.39<L>    Ca    8.0<L>      23 Aug 2019 08:20  Phos  2.4     08-23  Mg     1.6     08-23    # Acute blood loss anemia secondary to GI bleed  - s/p EGD 8/23 A single localized, 10 mm non-bleeding erosion was found on the lesser curvature of the stomach  - repeat EGD w/ EUS to look for underlying vessel in 4-6 weeks  - currently HH 7s, close monitoring advice  - 2 peripheral bore IVs in place  - Active type and screen available  - continue to monitor CBC  - Protonix 40 mg po BID  - Patient being followed by Dr. Majano    # Sepsis from obstructing kidney stone now s/p ureteral stent  - restart Rocephin 1gram IV daily 8/24 as requested by urology  - on flagyl / bacteroides coverage #7/7  - scheduled for OR on Monday 8/26 for left ureteroscopy, laser lithotripsy, stone extraction, stent exchange  - Urine cultures:  E. coli /  Bacteroides /  Neisseria species  - had initial blood cx growing staph hominis - considered containment    # Otitis media  - on exam erythema notes @ 5 pm location, fluid behind the tympanic membrane  - Current antibiotic Rocephin covers the infection    # Acute respiratory failure  - Intubated for protection of airway on 8/19 - extubated 8/20    # Hypokalemia  - replete

## 2019-08-24 NOTE — PROGRESS NOTE ADULT - ASSESSMENT
67yo female with multiple medical problems s/p UGI bleed  on egd source in proximal stomach, erosion ?dieualfoys lesion  no active bleeding  plan PPI and repeat egd with EUS to look for underlying vessel in 4-6 weeks  for urologic procedure Monday  will no longer follow daily   please call with questions

## 2019-08-25 NOTE — PROGRESS NOTE ADULT - SUBJECTIVE AND OBJECTIVE BOX
Patient is a 68 y/o/f  admitted 8/12 due to lethargy, fever, anemia, with obstructing LEFT renal stone, UTI sepsis and Anemia - pt underwent placement of LEFT ureteral stent. UCx (+) neisseria sicca and bacteroides  In the hospital, patient had episode of low BPs with + stool guiac. , hgb dropped but no clear active bleeding and f/u CTA negative. Subsequently patient transferred in the CCU for drop to 6 (from 9 on 8/17). Patient underwent EGD on 8/19 which demonstrated large clot in the stomach but could not identify and specific or active source of bleeding.    8/20:  repeat EGD this morning - large gastric clot again identified but no active bleeding.  8/23: s/p EGD x 3  8/25: seen and eval. Hemodynamically stable. Denies any HA, CP, SOB. Comfortable. (L) ear fullness better.     Addendum: Patient was noting of (L) sided hearing problem, such as she might be having a congestion with the wax. I looked in the ear, ear was full of wax. I cleaned the wax - patient had erythematous temponinc membrane @ 5 pm location and fluid behind the tympanic membrane suspicious of otitis media. Patient does not have any pain but fullness.       REVIEW OF SYSTEMS:  General: NAD, hemodynamically stable   HEENT:  Eyes:  No visual loss, blurred vision, double vision or yellow sclerae. Ears, Nose, Throat:  No hearing loss, sneezing, congestion, runny nose or sore throat.  SKIN:  No rash or itching.  CARDIOVASCULAR:  No chest pain,   RESPIRATORY:  No shortness of breath, cough or sputum.  GASTROINTESTINAL:  No anorexia, nausea, vomiting or diarrhea. No abdominal pain or blood.  NEUROLOGICAL:  No headache, dizziness, syncope, paralysis, ataxia, numbness or tingling in the extremities. No change in bowel or bladder control.  MUSCULOSKELETAL:  No muscle, back pain, joint pain or stiffness.  HEMATOLOGIC:  Anemia  LYMPHATICS:  No enlarged nodes. No history of splenectomy.  ENDOCRINOLOGIC:  No reports of sweating, cold or heat intolerance. No polyuria or polydipsia.  ALLERGIES:  No history of asthma, hives, eczema or rhinitis.    Physical Exam:   GENERAL APPEARANCE:  NAD, hemodynamically stable  T(C): 36.5 (25 Aug 2019 11:54), Max: 37.1 (24 Aug 2019 18:08)  T(F): 97.7 (25 Aug 2019 11:54), Max: 98.8 (24 Aug 2019 18:08)  HR: 101 (25 Aug 2019 11:54) (81 - 101)  BP: 154/85 (25 Aug 2019 11:54) (153/83 - 170/84)  RR: 20 (25 Aug 2019 11:54) (16 - 20)  SpO2: 96% (25 Aug 2019 11:54) (96% - 98%)  HEENT:  Head is normocephalic    Skin:  Warm and dry without any rash   NECK:  Supple without lymphadenopathy.   HEART:  Regular rate and rhythm. normal S1 and S2, No M/R/G  LUNGS:  Good ins/exp effort, no W/R/R/C  ABDOMEN:  Soft, nontender, nondistended with good bowel sounds heard  EXTREMITIES:  Without cyanosis, clubbing or edema.   NEUROLOGICAL:  Gross nonfocal     Labs:     143  |  107  |  7   ----------------------------<  131<H>  3.5   |  29  |  0.48<L>    Ca    8.5      25 Aug 2019 08:13    # Acute blood loss anemia secondary to GI bleed  - s/p EGD 8/23 A single localized, 10 mm non-bleeding erosion was found on the lesser curvature of the stomach  - repeat EGD w/ EUS to look for underlying vessel in 4-6 weeks  - currently HH 7s, close monitoring advice of CBC  - 2 peripheral bore IVs in place  - active type and screen available  - Protonix 40 mg po BID  - Patient being followed by Dr. Majano    # Sepsis from obstructing kidney stone now s/p ureteral stent  - restart Rocephin 1gram IV daily 8/24 as requested by urology  - on flagyl / bacteroides coverage #7/7  - scheduled for OR on Monday 8/26 for left ureteroscopy, laser lithotripsy, stone extraction, stent exchange  - Urine cultures:  E. coli /  Bacteroides /  Neisseria species  - had initial blood cx growing staph hominis - considered containment    # Otitis media  - on exam erythema notes @ 5 pm location, fluid behind the tympanic membrane  - patient is already on IV Rocephin      # Acute respiratory failure  - Intubated for protection of airway on 8/19 - extubated 8/20    # Hypokalemia  - replete

## 2019-08-26 NOTE — PROVIDER CONTACT NOTE (CRITICAL VALUE NOTIFICATION) - ACTION/TREATMENT ORDERED:
at bedside.NO orders received at this time.
order noted for 2 units PRBC
Orders received. Will continue to monitor patient.

## 2019-08-26 NOTE — PROGRESS NOTE ADULT - SUBJECTIVE AND OBJECTIVE BOX
Patient seen and examined at bedside  Was schedule for stone surgery but Hgb found to be 6.9 and currently receiving 2 units of pRBCs  No  complaints  No f/c/n/v    Medications  acetaminophen   Tablet .. 650 milliGRAM(s) Oral every 6 hours PRN  atorvastatin 40 milliGRAM(s) Oral at bedtime  baclofen 20 milliGRAM(s) Oral two times a day  dextrose 5%. 1000 milliLiter(s) IV Continuous <Continuous>  DULoxetine 20 milliGRAM(s) Oral two times a day  gabapentin 600 milliGRAM(s) Oral three times a day  lidocaine   Patch 1 Patch Transdermal daily  oxyCODONE    5 mG/acetaminophen 325 mG 1 Tablet(s) Oral every 6 hours PRN  oxyCODONE    5 mG/acetaminophen 325 mG 2 Tablet(s) Oral every 6 hours PRN  pantoprazole    Tablet 40 milliGRAM(s) Oral two times a day  polyethylene glycol 3350 17 Gram(s) Oral two times a day  polyethylene glycol 3350 17 Gram(s) Oral every 12 hours  prednisoLONE acetate 1% Suspension 1 Drop(s) Left EYE four times a day  valACYclovir 1000 milliGRAM(s) Oral daily  zolpidem 5 milliGRAM(s) Oral at bedtime PRN      ROS  General: No  fevers, chills, night sweats, fatigue, malaise  Ophthalmologic: No eye pain,  swelling  ENMT: No hearing changes,  ear pain,  nasal congestion  Respiratory and Thorax: No cough, sputum, dyspnea, wheezing  Cardiovascular: No chest pain, SOB, PND, dyspnea on exertion, orthopnea  Genitourinary: see above  Neurological:	 No  syncope, loss of consciousness, headache, dizziness  Psychiatric: No SI/HI/AH/VH.  Hematology/Lymphatics:	No bruising, lymphadenopathy    Vital Signs Last 24 Hrs  T(C): 36.8 (26 Aug 2019 13:17), Max: 37.1 (25 Aug 2019 21:27)  T(F): 98.3 (26 Aug 2019 13:17), Max: 98.7 (25 Aug 2019 21:27)  HR: 88 (26 Aug 2019 13:17) (81 - 94)  BP: 167/81 (26 Aug 2019 13:17) (131/79 - 167/81)  BP(mean): --  RR: 18 (26 Aug 2019 13:17) (18 - 18)  SpO2: 97% (26 Aug 2019 13:17) (95% - 97%)    PHYSICAL EXAM:  Constitutional:  NAD, lying in bed comfortably   Eyes: EOMI, vision is grossly intact  Neck: neck supple  Back: no CVA tenderness bilaterally  Respiratory: no labored breathing  Cardiovascular: no peripheral edema, cyanosis, or pallor. Extremities are warm and well perfused.   Gastrointestinal: soft, non-tender, non-distended, no guarding, no rebound tenderness. Bladder non-palpable  Neurological: A&O x3  Psychiatric: normal mood and affect    I/O    08-25-19 @ 07:01  -  08-26-19 @ 07:00  --------------------------------------------------------  IN: 0 mL / OUT: 1250 mL / NET: -1250 mL        Labs:  CBC Full  -  ( 26 Aug 2019 06:47 )  WBC Count : 9.86 K/uL  Hemoglobin : 6.9 g/dL  Hematocrit : 22.0 %  Platelet Count - Automated : 452 K/uL  Mean Cell Volume : 90.5 fl  Mean Cell Hemoglobin : 28.4 pg  Mean Cell Hemoglobin Concentration : 31.4 gm/dL  Auto Neutrophil # : x  Auto Lymphocyte # : x  Auto Monocyte # : x  Auto Eosinophil # : x  Auto Basophil # : x  Auto Neutrophil % : x  Auto Lymphocyte % : x  Auto Monocyte % : x  Auto Eosinophil % : x  Auto Basophil % : x    08-26    140  |  106  |  14  ----------------------------<  110<H>  4.2   |  31  |  0.53    Ca    8.2<L>      26 Aug 2019 06:47

## 2019-08-26 NOTE — PROGRESS NOTE ADULT - ASSESSMENT
Urosepsis 2/2 left ureteral stone with hydronephrosis, neurogenic bladder  s/p cystoscopy, left ureteral stent placement 8/12  Persistent anemia requiring blood transfusion    Plan:    -given non urgent and elective nature of surgery for left ureteral stone and persistent anemia requiring blood transfusions, will postpone procedure at this time in order to be as medically safe as possible. Had thorough discussion with patient and  at bedside who are in agreement. Will plan for outpatient follow up and scheduling of outpatient surgery once her GI evaluation is complete and her H/H remain stable. They understand. Answered all questions.  -continue CIC q4-6h  -can stop antibiotics at this time from  perspective  -no  contraindication for discharge home at this time  -continue care per medicine/GI    Thank you for allowing me to assist in the care of this patient  Please feel free to call with any questions/concerns    Jorge Lares MD  [facility]  W: [phone]

## 2019-08-26 NOTE — PROGRESS NOTE ADULT - SUBJECTIVE AND OBJECTIVE BOX
cc: weakness  hpi: 67y female admitted w/ sepsis/UTI/left obstructing renal stone s/p left ureteral stent.  Had acute blood loss anemia and was transferred to CCU.  Had 3 endoscopies since admission.  Large clot in stomach found but no active bleeding.  Patient was supposed to have stent exchange this morning but she had drop in h/h.  Pt denies active bleed. C/o constipation.  No abd pain, cp, sob, palp, ear pain.     ros- as per hpi above , other 10 point ros neg    Vital Signs Last 24 Hrs  T(C): 36.8 (26 Aug 2019 13:17), Max: 37.1 (25 Aug 2019 21:27)  T(F): 98.3 (26 Aug 2019 13:17), Max: 98.7 (25 Aug 2019 21:27)  HR: 88 (26 Aug 2019 13:17) (81 - 94)  BP: 167/81 (26 Aug 2019 13:17) (131/79 - 167/81)  BP(mean): --  RR: 18 (26 Aug 2019 13:17) (18 - 18)  SpO2: 97% (26 Aug 2019 13:17) (95% - 97%)      PHYSICAL EXAM:    General: chronic ill appearing female, NAD  Neuro: AAOx3   HEENT: NCAT  Neck: Soft and supple, No JVD  Respiratory: CTA b/l  Cardiovascular: S1 and S2, RRR  Gastrointestinal: +BS, soft, NTND  Extremities: No edema  Vascular: 2+ peripheral pulses    Culture - Urine (08.16.19 @ 04:15)    Specimen Source: .Urine Catheterized    Culture Results:   No growth          LABS: All Labs Reviewed:                        6.9    9.86  )-----------( 452      ( 26 Aug 2019 06:47 )             22.0     08-26    140  |  106  |  14  ----------------------------<  110<H>  4.2   |  31  |  0.53    Ca    8.2<L>      26 Aug 2019 06:47    MEDICATIONS  (STANDING):  atorvastatin 40 milliGRAM(s) Oral at bedtime  baclofen 20 milliGRAM(s) Oral two times a day  dextrose 5%. 1000 milliLiter(s) (50 mL/Hr) IV Continuous <Continuous>  DULoxetine 20 milliGRAM(s) Oral two times a day  gabapentin 600 milliGRAM(s) Oral three times a day  lidocaine   Patch 1 Patch Transdermal daily  pantoprazole    Tablet 40 milliGRAM(s) Oral two times a day  polyethylene glycol 3350 17 Gram(s) Oral two times a day  polyethylene glycol 3350 17 Gram(s) Oral every 12 hours  prednisoLONE acetate 1% Suspension 1 Drop(s) Left EYE four times a day  valACYclovir 1000 milliGRAM(s) Oral daily    MEDICATIONS  (PRN):  acetaminophen   Tablet .. 650 milliGRAM(s) Oral every 6 hours PRN Temp greater or equal to 38C (100.4F); MILD PAIN (1-3)  oxyCODONE    5 mG/acetaminophen 325 mG 1 Tablet(s) Oral every 6 hours PRN Moderate Pain (4 - 6)  oxyCODONE    5 mG/acetaminophen 325 mG 2 Tablet(s) Oral every 6 hours PRN Severe Pain (7 - 10)  zolpidem 5 milliGRAM(s) Oral at bedtime PRN Insomnia      ASSESSMENT and PLAN:    1. acute blood loss anemia due to UGIB  - s/p egd x 3. single localized, 10 mm non-bleeding erosion was found on the lesser curvature of the stomach  - needs repeat egd in 4-6 weeks  - drop in h/h today- transfuse 2 units and monitor  - PPI bid  - GI f/u appreciated- discussed this morning.  Monitor h/h    2. sepsis due to ecoli/bacteroides uti/obstructing stone s/p ureteral stent  - due to neurogenic bladder   - completed flagyl and rocephin  - plan was for stent exchange today and laser lithotripsy/stone extraction   but cancelled b/c drop h/h  - ID and Urology f/u appreciated    3. OM  - abx     4. acute hypoxic respiratory failure due to sepsis  - resolved.  s/p extubation 8/20    5. paraplegic since 2015  due to spinal hematoma  - baclofen pump for chronic pain     5. dvt px   scds

## 2019-08-27 NOTE — DISCHARGE NOTE NURSING/CASE MANAGEMENT/SOCIAL WORK - PATIENT PORTAL LINK FT
You can access the FollowMyHealth Patient Portal offered by [facility] by registering at the following website: [email or web address]. By joining [employer]’s FollowMyHealth portal, you will also be able to view your health information using other applications (apps) compatible with our system.

## 2019-08-27 NOTE — PROGRESS NOTE ADULT - ASSESSMENT
69y/o female admitted with severe acute on chronic anemia/sepsis noted to have hydronephrosis, urethral stone s/p cystoscopy and stent placement.  Complicated stay noting to have a significant upper gi bleed-s/p egd with Dr. Yoo noting to have a large blood clot in proximal stomach.    H/H stable-s/p blood transfusion, no further gi bleeding but no bm.  PPI  Ok from a gi standpoint to d/c today and f/u as outpatient.     Discussed with pt, nurse, Dr. Yoo. 69y/o female admitted with severe acute on chronic anemia/sepsis noted to have hydronephrosis, urethral stone s/p cystoscopy and stent placement.  Complicated stay noting to have a significant upper gi bleed-s/p egd with Dr. Yoo noting to have a large blood clot in proximal stomach.    H/H stable-s/p blood transfusion, no further gi bleeding but no bm.  PPI  Ok from a gi standpoint to d/c today and f/u as outpatient and may consider EUS with Dr. Goode as outpatient.  Dr. Yoo to speak with Dr. Goode.     Discussed with pt, nurse, Dr. Yoo.

## 2019-08-27 NOTE — PROGRESS NOTE ADULT - PROVIDER SPECIALTY LIST ADULT
Cardiology
Cardiology
Critical Care
Gastroenterology
Hospitalist
Infectious Disease
Infectious Disease
Urology
Critical Care
Hospitalist
Hospitalist
Gastroenterology
Hospitalist
Critical Care
Critical Care

## 2019-08-27 NOTE — PROGRESS NOTE ADULT - SUBJECTIVE AND OBJECTIVE BOX
cc: weakness  hpi: 67y female admitted w/ sepsis/UTI/left obstructing renal stone s/p left ureteral stent.  Had acute blood loss anemia and was transferred to CCU.  Had 3 endoscopies since admission.  Large clot in stomach found but no active bleeding.  Patient was supposed to have stent exchange this morning but she had drop in h/h.  Pt denies active bleed. C/o constipation.  No abd pain, cp, sob, palp, ear pain.   8/27- feeling well.  No bleeding.  constipated.  wants to go home.  Discussed w/ patient and .  Will have labs at home on Friday and then weekly.     ros- as per hpi above , other 10 point ros neg    Vital Signs Last 24 Hrs  T(C): 36.8 (27 Aug 2019 11:35), Max: 36.9 (27 Aug 2019 05:16)  T(F): 98.2 (27 Aug 2019 11:35), Max: 98.5 (27 Aug 2019 05:16)  HR: 88 (27 Aug 2019 11:35) (68 - 107)  BP: 160/79 (27 Aug 2019 11:35) (145/77 - 169/75)  BP(mean): --  RR: 16 (27 Aug 2019 11:35) (16 - 18)  SpO2: 96% (27 Aug 2019 11:35) (96% - 100%)    PHYSICAL EXAM:  General: chronic ill appearing female, NAD  Neuro: AAOx3   HEENT: NCAT  Neck: Soft and supple, No JVD  Respiratory: CTA b/l  Cardiovascular: S1 and S2, RRR  Gastrointestinal: +BS, soft, NTND  Extremities: No edema  Vascular: 2+ peripheral pulses    Culture - Urine (08.16.19 @ 04:15)    Specimen Source: .Urine Catheterized    Culture Results:   No growth          LABS: All Labs Reviewed:                        9.3    10.22 )-----------( 472      ( 27 Aug 2019 08:43 )             28.9     08-26    140  |  106  |  14  ----------------------------<  110<H>  4.2   |  31  |  0.53    Ca    8.2<L>      26 Aug 2019 06:47                                      6.9    9.86  )-----------( 452      ( 26 Aug 2019 06:47 )             22.0     08-26    140  |  106  |  14  ----------------------------<  110<H>  4.2   |  31  |  0.53    Ca    8.2<L>      26 Aug 2019 06:47    MEDICATIONS  (STANDING):  atorvastatin 40 milliGRAM(s) Oral at bedtime  baclofen 20 milliGRAM(s) Oral two times a day  dextrose 5%. 1000 milliLiter(s) (50 mL/Hr) IV Continuous <Continuous>  DULoxetine 20 milliGRAM(s) Oral two times a day  gabapentin 600 milliGRAM(s) Oral three times a day  lidocaine   Patch 1 Patch Transdermal daily  pantoprazole    Tablet 40 milliGRAM(s) Oral two times a day  polyethylene glycol 3350 17 Gram(s) Oral two times a day  polyethylene glycol 3350 17 Gram(s) Oral every 12 hours  prednisoLONE acetate 1% Suspension 1 Drop(s) Left EYE four times a day  valACYclovir 1000 milliGRAM(s) Oral daily    MEDICATIONS  (PRN):  acetaminophen   Tablet .. 650 milliGRAM(s) Oral every 6 hours PRN Temp greater or equal to 38C (100.4F); MILD PAIN (1-3)  oxyCODONE    5 mG/acetaminophen 325 mG 1 Tablet(s) Oral every 6 hours PRN Moderate Pain (4 - 6)  oxyCODONE    5 mG/acetaminophen 325 mG 2 Tablet(s) Oral every 6 hours PRN Severe Pain (7 - 10)  zolpidem 5 milliGRAM(s) Oral at bedtime PRN Insomnia      ASSESSMENT and PLAN:    1. acute blood loss anemia due to UGIB  - s/p egd x 3. single localized, 10 mm non-bleeding erosion was found on the lesser curvature of the stomach  - needs repeat egd in 4-6 weeks  - drop in h/h today- transfuse 2 units and monitor  - PPI bid  - GI f/u appreciated- discussed this morning.  Monitor h/h  8/27- h/h improved, d/c home discussed, repeat labs Fri.  Pt and  will call GI office to make appt    2. sepsis due to ecoli/bacteroides uti/obstructing stone s/p ureteral stent  - due to neurogenic bladder   - completed flagyl and rocephin  - plan was for stent exchange today and laser lithotripsy/stone extraction   but cancelled b/c drop h/h  - ID and Urology f/u appreciated - outpatient follow up    3. OM  - abx complete- outpatient f/u    4. acute hypoxic respiratory failure due to sepsis  - resolved.  s/p extubation 8/20    5. paraplegic since 2015  due to spinal hematoma  - baclofen pump for chronic pain     5. dvt px   scds     Time to d/c 55min.

## 2019-08-27 NOTE — PROGRESS NOTE ADULT - SUBJECTIVE AND OBJECTIVE BOX
Patient is a 68y old  Female who presents with a chief complaint of Lethargy, weakness (26 Aug 2019 14:17)    HPI:  This patient is a 67 year old female with PMH signficiant for:                   Repair of Aortic Dissection 2009 than 2014                   Spinal Hematoma leading to Paraplegia 2015                   HTN                   chronic pain - has baclofen pump                   self catheterization with chronic UTIs                  ? ivc filter  In January patient had hgb of 10.8 but low mcv,    Today she presents with weakness over a week , so bad that today she could barely sit up.  In ED she was found to have hgb of 4.3, with dec mcv, no dark stools, no hx. GI bleeding,  ct done in ED pending  also pyuria, likely UTI  Sinus tach to 150 (12 Aug 2019 03:20)    8/27/2019-  Pt wants to go home.   Now with constipation.   No further melena.   H/H stable.     PAST MEDICAL & SURGICAL HISTORY:  Dorsalgia of lumbar region: on pain medication /baclofen po and pump  Self-catheterizes urinary bladder  Anemia: chronic  Uveitis  Osteoporosis  PAD (peripheral artery disease)  Hematoma: spinal  September  treated  September 2018  Paraplegia: on wheelchair goes to physical therapy 2 x weekly  Aortic dissection, thoracic: Type A Repaired 2009  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2018  UTI (urinary tract infection): stable x 3 months  TIA (transient ischemic attack)  HTN (Hypertension): on meds  History of corneal transplant: left corneal transplant on 5/21/2018  Disorder of spine: unthetethering 2 x  Presence of IVC filter: 2014 ?  S/P aortic dissection repair: Type A Dissection repair /2009   descending aortic aneurysm repair 9/2016  H/O Spinal surgery: laminectomies 2014    MEDICATIONS  (STANDING):  atorvastatin 40 milliGRAM(s) Oral at bedtime  baclofen 20 milliGRAM(s) Oral two times a day  DULoxetine 20 milliGRAM(s) Oral two times a day  gabapentin 600 milliGRAM(s) Oral three times a day  lidocaine   Patch 1 Patch Transdermal daily  pantoprazole    Tablet 40 milliGRAM(s) Oral two times a day  polyethylene glycol 3350 17 Gram(s) Oral every 12 hours  prednisoLONE acetate 1% Suspension 1 Drop(s) Left EYE four times a day  valACYclovir 1000 milliGRAM(s) Oral daily    MEDICATIONS  (PRN):  acetaminophen   Tablet .. 650 milliGRAM(s) Oral every 6 hours PRN Temp greater or equal to 38C (100.4F); MILD PAIN (1-3)  oxyCODONE    5 mG/acetaminophen 325 mG 1 Tablet(s) Oral every 6 hours PRN Moderate Pain (4 - 6)  oxyCODONE    5 mG/acetaminophen 325 mG 2 Tablet(s) Oral every 6 hours PRN Severe Pain (7 - 10)  zolpidem 5 milliGRAM(s) Oral at bedtime PRN Insomnia    Allergies  No Known Allergies    FAMILY HISTORY:  Family history of Alzheimer's disease    REVIEW OF SYSTEMS:  CONSTITUTIONAL: No weakness, fevers or chills  RESPIRATORY: No cough, wheezing, hemoptysis; No shortness of breath  CARDIOVASCULAR: No chest pain or palpitations  GASTROINTESTINAL: Per HPI.     Vital Signs Last 24 Hrs  T(C): 36.9 (27 Aug 2019 05:16), Max: 36.9 (27 Aug 2019 05:16)  T(F): 98.5 (27 Aug 2019 05:16), Max: 98.5 (27 Aug 2019 05:16)  HR: 107 (27 Aug 2019 05:16) (68 - 107)  BP: 160/73 (26 Aug 2019 22:32) (144/82 - 169/75)  BP(mean): --  RR: 18 (27 Aug 2019 05:16) (18 - 18)  SpO2: 96% (27 Aug 2019 05:16) (96% - 100%)    PHYSICAL EXAM:  Constitutional: NAD, paraplegic   Respiratory: CTAB  Cardiovascular: S1 and S2, RRR, no M/G/R  Gastrointestinal: BS+, soft, NT/ND    LABS:                        9.3    10.22 )-----------( 472      ( 27 Aug 2019 08:43 )             28.9     08-26    140  |  106  |  14  ----------------------------<  110<H>  4.2   |  31  |  0.53    Ca    8.2<L>      26 Aug 2019 06:47            RADIOLOGY & ADDITIONAL STUDIES:

## 2019-08-27 NOTE — PROGRESS NOTE ADULT - REASON FOR ADMISSION
Lethargy, weakness

## 2019-08-28 NOTE — ED PROVIDER NOTE - NS ED ROS FT
Constitutional: No fever or chills. +Lethargic. +Weakness.   Eyes: No visual changes  HEENT: No throat pain  CV: No chest pain  Resp: No SOB no cough  GI: No abd pain. +Nausea. +Vomiting.  : No dysuria  MSK: No musculoskeletal pain  Skin: No rash  Neuro: No headache Constitutional: No fever or chills. +Lethargic. +Weakness.   Eyes: No visual changes  HEENT: No throat pain  CV: No chest pain  Resp: No SOB no cough  GI: No abd pain nausea or vomiting  : No dysuria  MSK: No musculoskeletal pain  Skin: No rash  Neuro: No headache

## 2019-08-28 NOTE — ED PROVIDER NOTE - PROGRESS NOTE DETAILS
Bhavin Delacruz for attending Dr. Kyle: LOT #[id number] QC Positive. Brown stool black positive. Bhavin Delacruz for attending Dr. Kyle: Angelica CORNEJO Bhavin Delacruz for attending Dr. Kyle: [facility] - spoke with Dr. Louis - aware of patient will inform Dr. Majano and follow Spoke with ICU will see patient pt seen by Dr. Smith ICU - does not need ICU at this time will go to step down - pt endorsed to Dr. Sosa. Rodri Kyle M.D., Attending Physician

## 2019-08-28 NOTE — ED PROVIDER NOTE - CLINICAL SUMMARY MEDICAL DECISION MAKING FREE TEXT BOX
68 female with a complex medical hx recently admitted for massive GI bleed, hypotension, tachycardic, black stools. Here pt is weak, pale appearing, guaiac positive stool. Concern for GI bleed. Will obtain labs blood transfusion ICU.

## 2019-08-28 NOTE — ED PROVIDER NOTE - OBJECTIVE STATEMENT
69 y/o female with a PMHx of Dorsalgia of lumbar region, anemia, peripheral artery disease, hematoma, paraplegia, aortic dissection, UTI, TIA, HTN presents to the ED c/o hypotension. Pt c/o hypotension and tachycardia. Per EMS, pt was DC'd from ED after GI bleed x2 days ago. +Nausea. +Vomiting. Denies melena or bloody stools, SOB, CP, abdominal pain. Did not have urinary sx last visit. As per , she is lethargic. Hx received from . PCP: Basil Branham. 69 y/o female with a PMHx of Dorsalgia of lumbar region, anemia, peripheral artery disease, hematoma, paraplegia, aortic dissection, UTI, TIA, HTN presents to the ED c/o hypotension. Pt c/o hypotension and tachycardia. Per EMS, pt was DC'd from ED after GI bleed x2 days ago. +Nausea. +Vomiting. denies SOB, CP, abdominal pain. Did not have urinary sx last visit. As per , she is lethargic. Hx received from . PCP: Basil Branham.

## 2019-08-28 NOTE — ED ADULT NURSE NOTE - NSIMPLEMENTINTERV_GEN_ALL_ED
Implemented All Fall with Harm Risk Interventions:  [place] to call system. Call bell, personal items and telephone within reach. Instruct patient to call for assistance. Room bathroom lighting operational. Non-slip footwear when patient is off stretcher. Physically safe environment: no spills, clutter or unnecessary equipment. Stretcher in lowest position, wheels locked, appropriate side rails in place. Provide visual cue, wrist band, yellow gown, etc. Monitor gait and stability. Monitor for mental status changes and reorient to person, place, and time. Review medications for side effects contributing to fall risk. Reinforce activity limits and safety measures with patient and family. Provide visual clues: red socks.

## 2019-08-28 NOTE — ED PROVIDER NOTE - NS_ ATTENDINGSCRIBEDETAILS _ED_A_ED_FT
I, Rodri Kyle MD,  performed the initial face to face bedside interview with this patient regarding history of present illness, review of symptoms and relevant past medical, social and family history.  I completed an independent physical examination.  I was the initial provider who evaluated this patient.  The history, relevant review of systems, past medical and surgical history, medical decision making, and physical examination was documented by the scribe in my presence and I attest to the accuracy of the documentation.

## 2019-08-28 NOTE — ED ADULT NURSE REASSESSMENT NOTE - NS ED NURSE REASSESS COMMENT FT1
dr. marsh made aware of lactate of 3.2. as per dr. marsh, repeat lactate to be drawn after blood transfusions. will continue to monitor.

## 2019-08-28 NOTE — ED ADULT TRIAGE NOTE - CHIEF COMPLAINT QUOTE
pt presents to ED with complaints of hypotension and tachycardia. per EMS, pt was just D/C from  ED after GI bleed 2 days ago. per EMs pt was hypotensive to 80s in field. fluids started in field. pt also endorsing nausea and vomiting.

## 2019-08-28 NOTE — ED PROVIDER NOTE - PHYSICAL EXAMINATION
Constitutional: NAD AAOx3 +pale appearing   Eyes: PERRLA EOMI  Head: Normocephalic atraumatic  Mouth: MMM  Cardiac: tachycardic   Resp: Lungs CTAB  GI: Abd s/nt/nd  Neuro: CN2-12 intact  Skin: No rashes Constitutional: +pale appearing  ill appearing  Eyes: PERRLA EOMI  Head: Normocephalic atraumatic  Mouth: MMM  Cardiac: tachycardic   Resp: Lungs CTAB  GI: Abd s/nt/nd  Neuro: CN2-12 intact  Skin: No rashes

## 2019-08-28 NOTE — ED ADULT NURSE NOTE - OBJECTIVE STATEMENT
patient axox3, c/o hypotension and tachycardia. as per patient and , pt was discharged from  after GI bleed 2 days ago. as per pt's ,  took pt's blood pressure with home machine and systolic pressure was in the 70s. patient also c/o nausea and vomiting. denies diarrhea, fevers. patient presents weak and pale with cool and clammy skin and tachycardic.

## 2019-08-29 NOTE — H&P ADULT - HISTORY OF PRESENT ILLNESS
This patient is a 67 year old female with PMH significantfor:                   Repair of Aortic Dissection 2009 than 2014                   Spinal Hematoma leading to Paraplegia 2015                   HTN                   chronic pain - has baclofen pump                             Possible IVC filter  Patient also with recent UGI bleed with HGB of 4.4, S/P multiple blood transfusions and discharged home 2 days ago.  Presented to ED with hypotension with lactate of 3.2. Received IV fluid in ED, lactate down to 1.9 this am. No obvious GI bleed. No hematemesis or melena. No nausea, vomiting, abd pain, fever or chills.   Her BP was low at home asper patient. She was seen by ICU, no need for ICU admission. Her blood pressure improved.   She also received IV rocephin for UTI.

## 2019-08-29 NOTE — ED ADULT NURSE REASSESSMENT NOTE - NS ED NURSE REASSESS COMMENT FT1
Pt received from previous RN.  Pt aaox3  c/o pain , medicated per rx orders.  Plan of care, transition from ED to admitted status discussed with pt.  All questions answered to pt satisfaction.   Call bell given to patient.  No additional needs at this time, will continue hourly rounding.

## 2019-08-29 NOTE — ED ADULT NURSE REASSESSMENT NOTE - NS ED NURSE REASSESS COMMENT FT1
Assumed care of patient. Attempting to reach hospitalist about patient's care. Spoke with patient's . will continue to monitor.

## 2019-08-29 NOTE — ED ADULT NURSE REASSESSMENT NOTE - NS ED NURSE REASSESS COMMENT FT1
incontinence care done. patient turned and repositioned. pillows adjusted. patient stable, in no acute distress. will continue to monitor.

## 2019-08-29 NOTE — CONSULT NOTE ADULT - ASSESSMENT
left ureteral stone s/p ureteral stent  ?hypotension 2/2 infection  neurogenic bladder    Plan:    -no acute  intervention indicated at this time  -continue antibiotics  -follow up cultures  -continue CIC q4-6h  -will eventually need stone surgery before stent can be removed - to be scheduled as outpatient. She understands.     Thank you for allowing me to assist in the care of this patient  Please feel free to call with any questions/concerns    Jorge Lares MD  [facility]  W: [phone]

## 2019-08-29 NOTE — H&P ADULT - NSHPLABSRESULTS_GEN_ALL_CORE
8.4    16.31 )-----------( 612      ( 28 Aug 2019 22:41 )             27.0     28 Aug 2019 22:41    145    |  110    |  18     ----------------------------<  161    4.9     |  26     |  0.76     Ca    7.9        28 Aug 2019 22:41    TPro  5.5    /  Alb  2.4    /  TBili  0.4    /  DBili  x      /  AST  26     /  ALT  11     /  AlkPhos  72     28 Aug 2019 22:41    LIVER FUNCTIONS - ( 28 Aug 2019 22:41 )  Alb: 2.4 g/dL / Pro: 5.5 gm/dL / ALK PHOS: 72 U/L / ALT: 11 U/L / AST: 26 U/L / GGT: x           PT/INR - ( 28 Aug 2019 22:41 )   PT: 11.3 sec;   INR: 1.02 ratio         PTT - ( 28 Aug 2019 22:41 )  PTT:26.7 sec  CAPILLARY BLOOD GLUCOSE            Urinalysis Basic - ( 28 Aug 2019 23:57 )    Color: Yellow / Appearance: Clear / S.010 / pH: x  Gluc: x / Ketone: Negative  / Bili: Negative / Urobili: Negative mg/dL   Blood: x / Protein: 30 mg/dL / Nitrite: Negative   Leuk Esterase: Moderate / RBC: Negative /HPF / WBC >50   Sq Epi: x / Non Sq Epi: Few / Bacteria: Moderate

## 2019-08-29 NOTE — H&P ADULT - NSHPPHYSICALEXAM_GEN_ALL_CORE
Vital Signs Last 24 Hrs  T(C): 36.7 (29 Aug 2019 07:02), Max: 36.7 (29 Aug 2019 02:42)  T(F): 98.1 (29 Aug 2019 07:02), Max: 98.1 (29 Aug 2019 07:02)  HR: 110 (29 Aug 2019 08:39) (83 - 151)  BP: 177/95 (29 Aug 2019 08:39) (82/55 - 177/95)  BP(mean): --  RR: 18 (29 Aug 2019 07:02) (13 - 25)  SpO2: 100% (29 Aug 2019 07:02) (90% - 100%)          Constitutional: +pale appearing  ill appearing  	Eyes: PERRLA EOMI  	Head: Normocephalic atraumatic  	Mouth: MMM  	Cardiac: tachycardic, no audible murmur  	Resp: Lungs CTAB  	GI: Abd s/nt/nd  	Neuro: CN2-12 intact               Skin: No rashes

## 2019-08-29 NOTE — H&P ADULT - ASSESSMENT
This patient is a 67 year old female with PMH significantfor:                   Repair of Aortic Dissection 2009 than 2014                   Spinal Hematoma leading to Paraplegia 2015                   HTN                   chronic pain - has baclofen pump                             Possible IVC filter  Patient also with recent UGI bleed with HGB of 4.4, S/P multiple blood transfusions and discharged home 2 days ago.  Presented to ED with hypotension with lactate of 3.2. Received IV fluid in ED, lactate down to 1.9 this am. No obvious GI bleed. No hematemesis or melena. No nausea, vomiting, abd pain, fever or chills.   Her BP was low at home asper patient. She was seen by ICU, no need for ICU admission. Her blood pressure improved.        1. Hypotension with high lactate  Suspected sepsis due to UTI  Admit to tele  Follow urine  and blood cultures.  Continue IV rocephin  BP improved.  No obvious GI bleed  H/O recent UGI bleed and multiple blood transfusion  GI eval  Change protonix to IV    2. HTN-was hypotensive  Continue labetalol.     3. Paraplegia-  Continue baclofen  Supportive care  Fall precautions.     4. Chr pain-  Continue oxycodone.

## 2019-08-29 NOTE — ED ADULT NURSE REASSESSMENT NOTE - NS ED NURSE REASSESS COMMENT FT1
incontinence and wound care to buttock provided. patient turned and repositioned.  at bedside. will continue to monitor.

## 2019-08-30 NOTE — PROGRESS NOTE ADULT - ASSESSMENT
left ureteral stone s/p ureteral stent  neurogenic bladder  anemia    Plan:    -no active  issues at this time  -continue CIC q4-6h  -follow up GI given anemia  -to follow up with me 1-2 weeks after discharge to schedule stone surgery. She understands.   -reconsult prn    Thank you for allowing me to assist in the care of this patient  Please feel free to call with any questions/concerns    Jorge Lares MD  [facility]  W: [phone]

## 2019-08-30 NOTE — PROGRESS NOTE ADULT - ASSESSMENT
68 year old female with PMH  of HTN, HLD, h/o TIA,  Type A aortic thoracic dissection 5/2009 s/p repair, s/p descending aortic aneurysm repair 09/2016, spontaneous subdural spinal cord hemorrhage s/p drainage 08/2014  resulted in  paraplegia now wheelchair bound , Left eye blindness s/p cornea transplant, h/o of multiple UTIs in the setting of self catheterization, empyema, Chronic back pain,  baclofen pump, S/p IVC admitted for:       1. Hypotension with high lactate  Suspected sepsis due to UTI vs Hypovolemia due to GI bleed   Lactate improved after IVF bolus    BP stable   F/i  urine  and blood cultures.  C/w IV Rocephin   Pt has H/o ureteral stent, Urology eval  ordered   ID eval       2. Acute on Chronic anemia, suspect Upper GI bleed   H/H did not respond to 1U PRBcs, actually dropped, stat 1U PRBcs ordered  had dark BM  place NPO now  Change OOI to IV Drip  D/w GI team, plan for EGD today   Monitor closely, repeat H/H post transfusion.       3. HTN-  BP stable   Continue labetalol with parameters     4.  Paraplegia, stable  Supportive acre   Continue baclofen  Fall precautions.     5. Chr pain-  Continue oxycodone.       6. DVT PPxs: venodynes

## 2019-08-30 NOTE — PROGRESS NOTE ADULT - SUBJECTIVE AND OBJECTIVE BOX
Patient seen and examined at bedside  No  complaints  No abdominal/flank pain  AFVSS    WBC normal - 7.7  Hgb - 7.0  Blood culture negative    Medications  acetaminophen   Tablet .. 650 milliGRAM(s) Oral every 6 hours PRN  atorvastatin 40 milliGRAM(s) Oral at bedtime  baclofen 20 milliGRAM(s) Oral two times a day  cefTRIAXone Injectable. 1000 milliGRAM(s) IV Push every 24 hours  DULoxetine 20 milliGRAM(s) Oral daily  gabapentin 600 milliGRAM(s) Oral three times a day  labetalol 200 milliGRAM(s) Oral two times a day  ondansetron Injectable 4 milliGRAM(s) IV Push every 6 hours PRN  oxyCODONE    IR 10 milliGRAM(s) Oral every 6 hours PRN  pantoprazole Infusion 8 mG/Hr IV Continuous <Continuous>  polyethylene glycol 3350 17 Gram(s) Oral every 12 hours  prednisoLONE acetate 1% Suspension 1 Drop(s) Both EYES two times a day PRN  sodium chloride 2% Ophthalmic Solution 1 Drop(s) Both EYES two times a day PRN  valACYclovir 1000 milliGRAM(s) Oral daily  zolpidem 5 milliGRAM(s) Oral at bedtime PRN      ROS  General: No fevers, chills, night sweats, fatigue, malaise  Ophthalmologic: No eye pain,   ENMT: No hearing changes	  Respiratory and Thorax: No cough, sputum, dyspnea, wheezing  Cardiovascular: No chest pain, SOB, PND, dyspnea on exertion, orthopnea, edema, palpitations  Gastrointestinal:	No diarrhea, constipation, nausea, vomiting, anorexia, hematochezia, melena.   Genitourinary: see above  Neurological:	 No syncope, loss of consciousness, headache, dizziness  Psychiatric: No SI/HI/AH/VH.      Vital Signs Last 24 Hrs  T(C): 36.8 (30 Aug 2019 10:36), Max: 36.9 (29 Aug 2019 21:03)  T(F): 98.2 (30 Aug 2019 10:36), Max: 98.5 (29 Aug 2019 21:03)  HR: 94 (30 Aug 2019 10:36) (85 - 114)  BP: 141/70 (30 Aug 2019 10:36) (140/65 - 156/80)  BP(mean): --  RR: 16 (30 Aug 2019 10:36) (14 - 16)  SpO2: 98% (30 Aug 2019 10:36) (95% - 98%)    PHYSICAL EXAM:  Constitutional: NAD, lying in bed comfortably   Eyes: EOMI, vision is grossly intact  Back: no CVA tenderness bilaterally  Respiratory: no labored breathing  Cardiovascular: no peripheral edema, cyanosis, or pallor. Extremities are warm and well perfused.   Gastrointestinal: soft, non-tender, non-distended, no guarding, no rebound tenderness.   Genitourinary: Bladder non-palpable  Neurological:  A&O x3  Psychiatric: normal mood and affect    I/O    08-29-19 @ 07:01  -  08-30-19 @ 07:00  --------------------------------------------------------  IN: 0 mL / OUT: 1200 mL / NET: -1200 mL        Labs:  CBC Full  -  ( 30 Aug 2019 07:02 )  WBC Count : 7.70 K/uL  Hemoglobin : 7.0 g/dL  Hematocrit : 22.4 %  Platelet Count - Automated : 296 K/uL  Mean Cell Volume : 91.1 fl  Mean Cell Hemoglobin : 28.5 pg  Mean Cell Hemoglobin Concentration : 31.3 gm/dL  Auto Neutrophil # : x  Auto Lymphocyte # : x  Auto Monocyte # : x  Auto Eosinophil # : x  Auto Basophil # : x  Auto Neutrophil % : x  Auto Lymphocyte % : x  Auto Monocyte % : x  Auto Eosinophil % : x  Auto Basophil % : x    08-30    143  |  107  |  32<H>  ----------------------------<  91  4.4   |  28  |  0.56    Ca    8.3<L>      30 Aug 2019 07:02    TPro  5.5<L>  /  Alb  2.4<L>  /  TBili  0.4  /  DBili  x   /  AST  26  /  ALT  11<L>  /  AlkPhos  72  08-28          Radiology:

## 2019-08-30 NOTE — CONSULT NOTE ADULT - ASSESSMENT
A/P 68F with UGIB, unable to localize stop bleed on EGD, and bleed not visible on CTA, however continues to bleed      -transfuse PRN for Hb>7  -Protonix drip  -Trend H/H q6hrs  -Tentative OR in AM for Ex-Lap, with excision of bleeding ulcer,  -Currently stable, if she becomes hemodynamically unstable- patient will need emergent OR  -NPO  -IVF  -Pre-op labs  -ICU Clearence      d/w Dr Jalloh

## 2019-08-30 NOTE — PROGRESS NOTE ADULT - ASSESSMENT
A/P: 68 female with a recurrent UGIB and anemia post hemorrhage          Plan:  Transfer to the ICU    PULM: No active issues    Cardio: Tachycardia likely from ongoing bleeding  Bolus 1L NS now, Hold hypertensives at this time.      Renal: Elevated BUN/Cr ratio likely from the UGIB    GI: NPO, CBC stat and q6h, PPI Drip currently infusing, STAT CTA abd being done now.  Possible IR for Embol--As Dr. Yoo spoke to IR himself.  Called a Surgery consult with Dr. Munoz.      DVT Prophylaxis with Venodynes.

## 2019-08-30 NOTE — PROGRESS NOTE ADULT - SUBJECTIVE AND OBJECTIVE BOX
consulted by:  dr yoo   Reason for consult: UGI Bleed    relevant chart and imaging reviewed.    patient is a 68yFemale presented with UGI bleed.  Endo shows blood in funds of stomach with adherent clot.      Gastrointestinal hemorrhage  Family history of Alzheimer's disease  No pertinent family history in first degree relatives  Handoff  MEWS Score  Dorsalgia of lumbar region  Self-catheterizes urinary bladder  Anemia  Uveitis  Osteoporosis  PAD (peripheral artery disease)  Hematoma  Paraplegia  Aortic dissection, abdominal  Aortic dissection, thoracic  Blindness of left eye  Chronic constipation  Subdural hematoma  UTI (urinary tract infection)  TIA (transient ischemic attack)  Aortic Dissection, Abdominal  HTN (Hypertension)  Acute GI bleeding  Gastrointestinal hemorrhage, unspecified gastrointestinal hemorrhage type  Anemia due to blood loss  Dehydration  Hypotension, unspecified hypotension type  History of corneal transplant  Disorder of spine  Presence of IVC filter  S/P aortic dissection repair  H/O Spinal surgery  Aneurysm Repair, Abdominal Aortic  hypotensive  1  Acute cystitis      Allergies    No Known Allergies    Intolerances        HPI:  This patient is a 67 year old female with PMH significantfor:                   Repair of Aortic Dissection 2009 than 2014                   Spinal Hematoma leading to Paraplegia 2015                   HTN                   chronic pain - has baclofen pump                             Possible IVC filter  Patient also with recent UGI bleed with HGB of 4.4, S/P multiple blood transfusions and discharged home 2 days ago.  Presented to ED with hypotension with lactate of 3.2. Received IV fluid in ED, lactate down to 1.9 this am. No obvious GI bleed. No hematemesis or melena. No nausea, vomiting, abd pain, fever or chills.   Her BP was low at home asper patient. She was seen by ICU, no need for ICU admission. Her blood pressure improved.   She also received IV rocephin for UTI. (29 Aug 2019 08:57)      T(C): 37.3 (08-30-19 @ 18:00), Max: 37.3 (08-30-19 @ 18:00)  HR: 109 (08-30-19 @ 18:30) (85 - 110)  BP: 138/71 (08-30-19 @ 18:30) (135/72 - 171/67)  RR: 11 (08-30-19 @ 18:30) (11 - 20)  SpO2: 100% (08-30-19 @ 18:30) (95% - 100%)    PT/INR - ( 28 Aug 2019 22:41 )   PT: 11.3 sec;   INR: 1.02 ratio         PTT - ( 28 Aug 2019 22:41 )  PTT:26.7 sec    08-30    143  |  107  |  32<H>  ----------------------------<  91  4.4   |  28  |  0.56    Ca    8.3<L>      30 Aug 2019 07:02    TPro  5.5<L>  /  Alb  2.4<L>  /  TBili  0.4  /  DBili  x   /  AST  26  /  ALT  11<L>  /  AlkPhos  72  08-28                            7.0    9.74  )-----------( 306      ( 30 Aug 2019 18:14 )             21.4     Prior endoscopy showed Findings:       A single localized, 10 mm non-bleeding erosion was found on the lesser        curvature of the stomach. This appeared to be the site of prior        bleeding. Slightly raised surrounding mucosa with cernntral tethering        and umbilication. due to magnitude of recent bleeding, did not biopsy.        There were no stigmata of recent bleeding.       A small hiatal hernia was present.       The exam was otherwise without abnormality.  Complications:       No immediate complications.  Impression:          - Non-bleeding erosive gastropathy.                       - Small hiatal hernia.                       - The examination was otherwise normal.                       - No specimens collected.       Imaging shows:  CTA shows thoracic aortic repair.  infra SMA abdominal aortic dissection with extends into both iliac system.  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  Left gastric artery originates from celiac artery, just at site of occlusion, with sharp angulation. No source of active bleeding seen on CTA.  No aneurysm, or prominent submucosal artery in stomach.      Plan:    Prior endoscopy showed (8/23) an ulcer as possible source of bleeding.  Follow up endo today shows blood in fundus.  ? source identified on endoscopy. CTA showed no source of active bleeding.   Invasive angiography would likely not yield additional information at this time.  Risks are higher than typical mesenteric angiogram due to the existing dissection/vascular disease.   Prophylactic left gastric artery embolization (for the presumed fundal bleeding) is technically unlikely due to the occlusion of the celiac artery origin, sharp angulation of this vessel, and the extensive torturous SMA/Celiac collaterals which would make "back door" access difficult.       Above discussed with Dr Yoo.  Please re-consult IR if needed.

## 2019-08-30 NOTE — CHART NOTE - NSCHARTNOTEFT_GEN_A_CORE
Patient underwent endoscopy with fresh blood and clot in proximal stomach precluding further visualization  Plan for CT angiogram to see if feeding vessel isolated for likely Dieulafoy lesion with proximal stomach submucosal arteriole    Will transfer to ICU for further monitoring and support

## 2019-08-30 NOTE — CONSULT NOTE ADULT - ATTENDING COMMENTS
seen and examined 08-30-19 @ 1910    admitted 8/12-8/27/2019 for UGI bleed and urosepsis  -8/11 - 3u RBC transfused  -8/12 - CTA abd/pelvis - no extravasation  -8/12 - left ureteroscopy / stent  -8/13 - 1u RBC transfused  -8/18 - 5u RBC transfused  -8/18 - CTA abd/pelvis - no extravasation  -8/19 - EGD attempted, but too much clot in stomach  -8/20 - EGD reattempted, but too much clot in stomach  -8/23 - EGD - 10 mm nonbleeding erosion in lesser curvature, suspected Dieulafoy lesion  -8/25 - 1u RBC transfused  -8/25 - 1u RBC transfused    readmitted 8/29-? for UGI bleed  -8/29 - 1u RBC transfused  -8/29 - CTA abd/pelvis - no extravasation  -8/30 - 3u RBC transfused  -8/30 - CTA abd/pelvis - no extravasation  -8/30 - adherent clot in proximal stomach could not be removed    skin pale  soft / NT / ND  small volume of melanotic stool    IR consulted and confirmed that celiac axis is too tortuous for mesenteric angiogram.    I reviewed her case with a senior colleague and we agreed that surgical intervention was indicated at this time given failure of endoscopic control of bleeding, inability to perform angioembolization, and episode of hemorrhagic shock (tachycardia and lactic acidosis). After transfusion, she became hemodynamically stable and lactic acidosis resolved. There does not appear to be active hemorrhage at this time, so emergent surgery is not require overnight.    I described laparotomy / gastrotomy to the patient's  by phone and the patient at bedside. I explained the R/B/A. I offered to perform surgery on 8/31. They would like me to discuss her care with her PMD tomorrow morning and will then revisit the decision for surgery.      recurrent UGI requiring 15u RBC transfusion over 18 days  -PPI  -transfuse to hgb > 7 g/dL  -please call me if her condition worsens as she might require emergency surgery seen and examined 08-30-19 @ 1910    admitted 8/12-8/27/2019 for UGI bleed and urosepsis  -8/11 - 3u RBC transfused  -8/12 - CTA abd/pelvis - no extravasation  -8/12 - left ureteroscopy / stent  -8/13 - 1u RBC transfused  -8/18 - 5u RBC transfused  -8/18 - CTA abd/pelvis - no extravasation  -8/19 - EGD attempted, but too much clot in stomach  -8/20 - EGD reattempted, but too much clot in stomach  -8/23 - EGD - 10 mm nonbleeding erosion in lesser curvature, suspected Dieulafoy lesion  -8/25 - 1u RBC transfused  -8/25 - 1u RBC transfused    readmitted 8/29-? for UGI bleed  -8/29 - 1u RBC transfused  -8/29 - CTA abd/pelvis - no extravasation  -8/30 - 3u RBC transfused  -8/30 - CTA abd/pelvis - no extravasation  -8/30 - EGD - adherent clot in proximal stomach could not be removed    skin pale  soft / NT / ND  small volume of melanotic stool    IR consulted and confirmed that celiac axis is too tortuous for mesenteric angiogram.    I reviewed her case with a senior colleague and we agreed that surgical intervention was indicated at this time given failure of endoscopic control of bleeding, inability to perform angioembolization, and episode of hemorrhagic shock (tachycardia and lactic acidosis). After transfusion, she became hemodynamically stable and lactic acidosis resolved. There does not appear to be active hemorrhage at this time, so emergent surgery is not require overnight.    I described laparotomy / gastrotomy to the patient's  by phone and the patient at bedside. I explained the R/B/A. I offered to perform surgery on 8/31. They would like me to discuss her care with her PMD tomorrow morning and will then revisit the decision for surgery.      recurrent UGI requiring 15u RBC transfusion over 18 days  -PPI  -transfuse to hgb > 7 g/dL  -please call me if her condition worsens as she might require emergency surgery seen and examined 08-30-19 @ 1910    admitted 8/12-8/27/2019 for UGI bleed and urosepsis  -8/11 - 3u RBC transfused  -8/12 - CTA abd/pelvis - no extravasation  -8/12 - left ureteroscopy / stent  -8/13 - 1u RBC transfused  -8/18 - 5u RBC transfused  -8/18 - CTA abd/pelvis - no extravasation  -8/19 - EGD attempted, but too much clot in stomach  -8/20 - EGD reattempted, but too much clot in stomach  -8/23 - EGD - 10 mm nonbleeding erosion in lesser curvature, suspected Dieulafoy lesion  -8/25 - 1u RBC transfused  -8/25 - 1u RBC transfused    readmitted 8/29-? for UGI bleed  -8/29 - 1u RBC transfused  -8/29 - CTA abd/pelvis - no extravasation  -8/30 - 3u RBC transfused  -8/30 - CTA abd/pelvis - no extravasation  -8/30 - EGD - adherent clot in proximal stomach could not be removed    skin pale  soft / NT / ND  small volume of melanotic stool    IR consulted and confirmed that celiac axis is too tortuous for mesenteric angiogram.    I reviewed her case with a senior colleague and we agreed that surgical intervention was indicated at this time given failure of endoscopic control of bleeding, inability to perform angioembolization, and episode of hemorrhagic shock (tachycardia and lactic acidosis). After transfusion, she became hemodynamically stable and lactic acidosis resolved. There does not appear to be active hemorrhage at this time, so emergent surgery is not require overnight.    I described laparotomy / gastrotomy to the patient's  by phone and the patient at bedside. I explained the R/B/A. I offered to perform surgery on 8/31. They would like me to discuss her care with her PMD tomorrow morning and will then revisit the decision for surgery.      recurrent UGI bleed requiring 15u RBC transfusion over 18 days  -PPI  -transfuse to hgb > 7 g/dL  -please call me if her condition worsens as she might require emergency surgery

## 2019-08-30 NOTE — PROGRESS NOTE ADULT - SUBJECTIVE AND OBJECTIVE BOX
68 year old female with PMH  of HTN, HLD, h/o TIA,  Type A aortic thoracic dissection 2009 s/p repair, s/p descending aortic aneurysm repair 2016, spontaneous subdural spinal cord hemorrhage s/p drainage 2014  resulted in  paraplegia now wheelchair bound , Left eye blindness s/p cornea transplant, h/o of multiple UTIs in the setting of self catheterization, empyema, Chronic back pain,  baclofen pump, S/p IVC, recent admission for GI bleed required ICU admission and multiple blood transfusions  presented to ED c/o weakness and  Low BP at home also + NAusea and vomiting. EMS called, as per records SBP was in 80s, started on IVF.   In ED: BP improved in 120s, tachycardic. guaiac +. CT angio abd/pelvis with no active bleeding.  Labs with elevated WBCS, elevated lactate,  H/H slightly lower then at d/c.  + UA. Was given 1L IVF bolus , transfused 1U PRBCs. Started on IV Abxs. BCX and UCX sent   CXR neg     Today. H/H dripped to 7.0/22. BP stable. Pt was seen and examined, just had large blackish BM. Pt reports no dizziness, no CP. No  N/V. POC and plan for  EGD later on today discussed.       Vital Signs Last 24 Hrs  T(C): 37 (30 Aug 2019 13:58), Max: 37 (30 Aug 2019 13:58)  T(F): 98.6 (30 Aug 2019 13:58), Max: 98.6 (30 Aug 2019 13:58)  HR: 87 (30 Aug 2019 13:58) (85 - 94)  BP: 171/67 (30 Aug 2019 13:58) (140/65 - 171/67)  RR: 18 (30 Aug 2019 13:58) (16 - 18)  SpO2: 100% (30 Aug 2019 13:58) (95% - 100%)      REVIEW OF SYSTEMS:  All other review of systems is negative unless indicated above.      PHYSICAL EXAM:    General: Well developed, plae, in no acute distress  Eyes:  R Pupil reactive to light, EOMI,  L eye with some erythema chronic  Head: Normocephalic; atraumatic  ENMT: No nasal discharge; airway clear  Neck: Supple; non tender; no masses  Respiratory: Good air entry B/l No wheezes, rales or rhonchi  Cardiovascular: Regular rate and rhythm. S1 and S2 Normal; No murmurs  Gastrointestinal: Soft non-tender non-distended; Normal bowel sounds  Genitourinary: No suprapubic  tenderness   Extremities: No edema  Vascular: Peripheral pulses palpable 2+ bilaterally  Neurological: Alert and oriented x3, +  paraplegia   Skin: Warm and dry. No acute rash  Lymph Nodes: No acute cervical adenopathy  Musculoskeletal: No joint effusions or tenderness   Psychiatric: Cooperative           LABS:                  7.0    7.70  )-----------( 296      ( 30 Aug 2019 07:02 )             22.4     30 Aug 2019 07:02    143    |  107    |  32     ----------------------------<  91     4.4     |  28     |  0.56     Ca    8.3        30 Aug 2019 07:02    TPro  5.5    /  Alb  2.4    /  TBili  0.4    /  DBili  x      /  AST  26     /  ALT  11     /  AlkPhos  72     28 Aug 2019 22:41    PT/INR - ( 28 Aug 2019 22:41 )   PT: 11.3 sec;   INR: 1.02 ratio         PTT - ( 28 Aug 2019 22:41 )  PTT:26.7 sec  CAPILLARY BLOOD GLUCOSE        LIVER FUNCTIONS - ( 28 Aug 2019 22:41 )  Alb: 2.4 g/dL / Pro: 5.5 gm/dL / ALK PHOS: 72 U/L / ALT: 11 U/L / AST: 26 U/L / GGT: x           Urinalysis Basic - ( 28 Aug 2019 23:57 )    Color: Yellow / Appearance: Clear / S.010 / pH: x  Gluc: x / Ketone: Negative  / Bili: Negative / Urobili: Negative mg/dL   Blood: x / Protein: 30 mg/dL / Nitrite: Negative   Leuk Esterase: Moderate / RBC: Negative /HPF / WBC >50   Sq Epi: x / Non Sq Epi: Few / Bacteria: Moderate        MEDICATIONS  (STANDING):  atorvastatin 40 milliGRAM(s) Oral at bedtime  baclofen 20 milliGRAM(s) Oral two times a day  cefTRIAXone Injectable. 1000 milliGRAM(s) IV Push every 24 hours  DULoxetine 20 milliGRAM(s) Oral daily  gabapentin 600 milliGRAM(s) Oral three times a day  labetalol 200 milliGRAM(s) Oral two times a day  pantoprazole Infusion 8 mG/Hr (10 mL/Hr) IV Continuous <Continuous>  polyethylene glycol 3350 17 Gram(s) Oral every 12 hours  valACYclovir 1000 milliGRAM(s) Oral daily    MEDICATIONS  (PRN):  acetaminophen   Tablet .. 650 milliGRAM(s) Oral every 6 hours PRN Temp greater or equal to 38C (100.4F), Mild Pain (1 - 3)  ondansetron Injectable 4 milliGRAM(s) IV Push every 6 hours PRN Nausea and/or Vomiting  prednisoLONE acetate 1% Suspension 1 Drop(s) Both EYES two times a day PRN home med  sodium chloride 2% Ophthalmic Solution 1 Drop(s) Both EYES two times a day PRN dry eye      RADIOLOGY & ADDITIONAL TESTS:  < from: CT Angio Abdomen and Pelvis w/ IV Cont (19 @ 17:52) >  EXAM:  CT ANGIO ABD PELVIS (W)AW IC                            PROCEDURE DATE:  2019          INTERPRETATION:  CLINICAL INFORMATION: Upper GI bleed.    COMPARISON: CT abdomen and pelvis 2019.    PROCEDURE:   CT of the Abdomen and Pelvis was performed with and without intravenous   contrast.  Precontrast, Arterial and Delayed phases were performed.  Intravenous contrast: 90 ml Omnipaque 350. 10 ml discarded.  Oral contrast: None.  Sagittal and coronal reformats were performed.    FINDINGS:    LOWER CHEST: There is pleural calcification versus suture material in the   imaged left lung base. There is mild posterior dependent atelectasis.   There is mild bronchiectasis. There is atelectasis in the lingula.    LIVER: Within normal limits.  BILE DUCTS: Normal caliber.  GALLBLADDER: Layering hyperdensity within the gallbladder likely   represents vicarious excretion of contrast.  SPLEEN: Within normal limits.  PANCREAS: Within normal limits.  ADRENALS: Within normal limits.  KIDNEYS/URETERS: Atrophic left kidney with mildly decreased enhancement.   There is a left ureteral stent again noted, appropriately positioned. A   large 1.2 x 0.8 cm calculus in the mid left ureter is unchanged. Punctate   nonobstructing calculi again noted in the left kidney.    BLADDER: Distended with distal tip of left ureteral stent.  REPRODUCTIVE ORGANS: Uterus and adnexa within normal limits.    BOWEL: There is no evidence for bleed/extravasation into the bowel. No   hypervascular bowel lesions are identified. No bowel obstruction.   Appendix is not visualized. No definite evidence of inflammation in the   pericecal region.  PERITONEUM: No ascites.  VESSELS: Again noted is a aneurysmally dilated and tortuous descending   thoracic aorta. Stable abdominal aortic dissection is again noted   extending into the right iliac artery. Tortuous collateral vessels are   again noted in the upper abdomen in the periportal and perisplenic   regions. An IVC filter is again noted.  RETROPERITONEUM/LYMPH NODES: No lymphadenopathy.    ABDOMINAL WALL: Mild diffuse soft tissue anasarca. Generator pack for a   stimulator is present in the left gluteal area. Another stimulator   generator pack in the right lower anterior abdominal wall.  BONES: Median sternotomy wires. Degenerative change of the lumbar spine.    IMPRESSION:     No CT evidence for active gastrointestinal bleed.    Stable abdominal aortic dissection.

## 2019-08-30 NOTE — CONSULT NOTE ADULT - ASSESSMENT
69y/o female re-admitted with hypotension, and anemia after recently being admitted on CCU with severe acute on chronic anemia/sepsis noted to have hydronephrosis, urethral stone s/p cystoscopy and stent placement.  That stay was by complicated as pt had a significant upper gi bleed-s/p egd with Dr. Yoo noting to have a large blood clot in proximal stomach.    Slight drop in H/H today noted, baseline is in the 8s from prior admission.  No obvious gi bleeding at this time, last bm was yesterday- transfuse as needed.  PPI BID  Pt will likely need an eventual EUS when stable with Dr. Goode vs. repeat EGD pending above.   Continue to monitor closely, no plans for EGD at this time.     Discussed with pt, nurse, Dr. Yoo, will reach out to the hospitalist. 67y/o female re-admitted with hypotension, and anemia after recently being admitted on CCU with severe acute on chronic anemia/sepsis noted to have hydronephrosis, urethral stone s/p cystoscopy and stent placement.  That stay was by complicated as pt had a significant upper gi bleed-s/p egd noting a large blood clot in proximal stomach.  Repeat EGD on 8/23/2019- noted a 10mm nonbleeding erosion in the lesser curvature of the of stomach.   Slight drop in H/H today noted, baseline is in the 8s from prior admission.  No obvious gi bleeding at this time, last bm was yesterday- transfuse as needed.  PPI BID  Pt will likely need an eventual EUS when stable with Dr. Goode vs. eventual repeat EGD pending above.   Continue to monitor closely, no plans for EGD today.     Discussed with pt, nurse, Dr. Yoo, will reach out to the hospitalist. 67y/o female re-admitted with hypotension, and anemia after recently being admitted on CCU with severe acute on chronic anemia/sepsis noted to have hydronephrosis, urethral stone s/p cystoscopy and stent placement.  That stay was by complicated as pt had a significant upper gi bleed-s/p egd noting a large blood clot in proximal stomach.  Repeat EGD on 8/23/2019- noted a 10mm nonbleeding erosion in the lesser curvature of the of stomach.   Slight drop in H/H today and increase in BUN noted, concerns for UGIB.   No obvious gi bleeding at this time but pt is very high risk, last bm was yesterday- transfuse as needed.  PPI BID for now.   Pt will likely need an eventual EUS when stable with Dr. Goode vs. repeat EGD pending above.   Continue to monitor closely, no plans for EGD today.     Discussed with pt, nurse, Dr. Yoo, will reach out to the hospitalist.

## 2019-08-31 NOTE — DISCHARGE NOTE PROVIDER - HOSPITAL COURSE
Patient is a 68 paraplegic female who presented with hypotension.  She was admitted with a presumptive dx of sepsis.  However, the following morning her hemoglobulin was 7 and ir was realized she was having a recurrent UGIB.  Dhe had an EGD that showed fresh blood and a large clot.  This was similar to an admission she had the prior week where she had an UGIB requiring 6 U PRBC and 3 EGDs.  CTA was done and IR felt given her collaterals and aortic aneurysm repair it was too difficult to fix noninvasively.      This admission she has had a total of 4U PRBC.          Family has requested she be transferred to [place] where she has had the majority of her health care.  She had been accepted panding a Bed in their ICU. Patient is a 68 paraplegic female who presented with hypotension.  She was admitted with a presumptive dx of sepsis.  However, the following morning her hemoglobulin was 7 and ir was realized she was having a recurrent UGIB.  Dhe had an EGD that showed fresh blood and a large clot.  This was similar to an admission she had the prior week where she had an UGIB requiring 6 U PRBC and 3 EGDs.  CTA was done and IR felt given her collaterals and aortic aneurysm repair it was too difficult to fix noninvasively.      This admission she has had a total of 4U PRBC.          Family has requested she be transferred to [place] where she has had the majority of her health care.

## 2019-08-31 NOTE — PROGRESS NOTE ADULT - ASSESSMENT
67y/o female with bleeding lesion in stomach, ?Dielufoy's lesion, refractory to several attempts to localize lesion on EGD.    Rec:  -cont IV PPI  -trend CBC  -awaits transfer to [place] for definitive surgical procedure per pt's wishes  -d/w Dr. Wolf at bedside

## 2019-08-31 NOTE — PROGRESS NOTE ADULT - SUBJECTIVE AND OBJECTIVE BOX
Patient is a 67 year old female Patient also with recent UGI bleed.  She presented to ED with hypotension with lactate of 3.2. Received IV fluid in ED, lactate down to 1.9 this am. No obvious GI bleed. No hematemesis or melena.   Today her hgb fell to 7 and was given 2 U PRBC and in the afternoon developed melena.  Dr. Yoo did and EGD and again found fresh blood and clot in her proximal stomach.  Post EGD she hads developed tachycardia but with a preserved BP.  Patient is going for a STAT CT angio and then to the ICU.      8/31: Patient seen in bed.  S/P 2 U over night.           PAST MEDICAL & SURGICAL HISTORY:  Dorsalgia of lumbar region: on pain medication /baclofen po and pump  Self-catheterizes urinary bladder  Anemia: chronic  Uveitis  Osteoporosis  PAD (peripheral artery disease)  Hematoma: spinal  September  treated  September 2018  Paraplegia: on wheelchair goes to physical therapy 2 x weekly  Aortic dissection, thoracic: Type A Repaired 2009  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2018  UTI (urinary tract infection): stable x 3 months  TIA (transient ischemic attack)  HTN (Hypertension): on meds  History of corneal transplant: left corneal transplant on 5/21/2018  Disorder of spine: unthetethering 2 x  Presence of IVC filter: 2014 ?  S/P aortic dissection repair: Type A Dissection repair /2009   descending aortic aneurysm repair 9/2016  H/O Spinal surgery: laminectomies 2014      FAMILY HISTORY:  Family history of Alzheimer's disease      Social Hx:    Allergies    No Known Allergies    Intolerances            ICU Vital Signs Last 24 Hrs  T(C): 36.9 (31 Aug 2019 06:00), Max: 37.3 (30 Aug 2019 18:00)  T(F): 98.5 (31 Aug 2019 06:00), Max: 99.2 (30 Aug 2019 18:00)  HR: 80 (31 Aug 2019 08:00) (76 - 113)  BP: 127/65 (31 Aug 2019 08:00) (127/65 - 173/84)  BP(mean): 81 (31 Aug 2019 08:00) (81 - 113)  ABP: --  ABP(mean): --  RR: 11 (31 Aug 2019 08:00) (9 - 20)  SpO2: 100% (31 Aug 2019 08:00) (98% - 100%)          I&O's Summary    30 Aug 2019 07:01  -  31 Aug 2019 07:00  --------------------------------------------------------  IN: 1121 mL / OUT: 1200 mL / NET: -79 mL                              9.2    8.42  )-----------( 271      ( 31 Aug 2019 07:04 )             28.2       08-31    142  |  110<H>  |  35<H>  ----------------------------<  99  4.2   |  25  |  0.30<L>    Ca    8.1<L>      31 Aug 2019 07:04  Phos  3.0     08-31  Mg     2.0     08-31                      MEDICATIONS  (STANDING):  atorvastatin 40 milliGRAM(s) Oral at bedtime  baclofen 20 milliGRAM(s) Oral two times a day  cefTRIAXone Injectable. 1000 milliGRAM(s) IV Push every 24 hours  DULoxetine 20 milliGRAM(s) Oral daily  gabapentin 600 milliGRAM(s) Oral three times a day  labetalol 200 milliGRAM(s) Oral two times a day  pantoprazole Infusion 8 mG/Hr (10 mL/Hr) IV Continuous <Continuous>  polyethylene glycol 3350 17 Gram(s) Oral every 12 hours  valACYclovir 1000 milliGRAM(s) Oral daily    MEDICATIONS  (PRN):  acetaminophen   Tablet .. 650 milliGRAM(s) Oral every 6 hours PRN Temp greater or equal to 38C (100.4F), Mild Pain (1 - 3)  ondansetron Injectable 4 milliGRAM(s) IV Push every 6 hours PRN Nausea and/or Vomiting  prednisoLONE acetate 1% Suspension 1 Drop(s) Both EYES two times a day PRN home med  sodium chloride 2% Ophthalmic Solution 1 Drop(s) Both EYES two times a day PRN dry eye  zolpidem 5 milliGRAM(s) Oral at bedtime PRN Insomnia      DVT Prophylaxis: V    Advanced Directives:  Discussed with:    Visit Information: 30 min    ** Time is exclusive of billed procedures and/or teaching and/or routine family updates.

## 2019-08-31 NOTE — PROGRESS NOTE ADULT - ASSESSMENT
recurrent UGI bleed requiring 15u RBC transfusion over 18 days  -PPI  -transfuse to hgb > 7 g/dL  -Given the she had an episode of hemorrhagic shock with dangerously low blood pressure during her prior admission. I recommended ex-lap / gastric exploration / control of hemorrhage to prevent potentially fatal recurrent UGI bleed. After talking to her PCP, the patient and her  desire transfer to [facility]    While awaiting transfer, please call me if her condition worsens as she might require emergency surgery.

## 2019-08-31 NOTE — PROGRESS NOTE ADULT - SUBJECTIVE AND OBJECTIVE BOX
GI coverage for Dr. Yoo    No further bleeding  requested transfer to [place] for surgery  on phone deep in conversation  no clinical events per ICU team    REVIEW OF SYSTEMS:  CONSTITUTIONAL: No weakness, fevers or chills  RESPIRATORY: No cough, wheezing, hemoptysis; No shortness of breath  CARDIOVASCULAR: No chest pain or palpitations  GASTROINTESTINAL: Per HPI.     PHYSICAL EXAM:  Vital Signs Last 24 Hrs  T(C): 37.3 (31 Aug 2019 16:00), Max: 37.3 (30 Aug 2019 18:00)  T(F): 99.2 (31 Aug 2019 16:00), Max: 99.2 (30 Aug 2019 18:00)  HR: 79 (31 Aug 2019 16:00) (76 - 113)  BP: 158/66 (31 Aug 2019 16:00) (116/42 - 173/84)  BP(mean): 90 (31 Aug 2019 16:00) (60 - 113)  RR: 14 (31 Aug 2019 16:00) (9 - 20)  SpO2: 100% (31 Aug 2019 16:00) (98% - 100%)    Constitutional: NAD, on phone  Respiratory: CTAB  Cardiovascular: S1 and S2, RRR, no M/G/R  Gastrointestinal: BS+, soft, NT/ND  no edema, wwp    LABS:                                 8.8    6.57  )-----------( 230      ( 31 Aug 2019 15:03 )             26.7

## 2019-08-31 NOTE — PROGRESS NOTE ADULT - ASSESSMENT
A/P: 68 female with a recurrent UGIB and anemia post hemorrhage          Plan:  ICU    PULM: No active issues    Cardio: BP stable    Renal: Elevated BUN/Cr ratio likely from the UGIB    GI: NPO, CBC q6h, PPI Drip, STAT CTA abd being done now.  Possible OR today.  IR Not an option    DVT Prophylaxis with Venodynes. A/P: 68 female with a recurrent UGIB and anemia post hemorrhage          Plan:  ICU    PULM: No active issues    Cardio: BP stable    Renal: Elevated BUN/Cr ratio likely from the UGIB    GI: NPO, CBC q6h, PPI Drip, STAT CTA abd being done now.  Possible OR today.  IR Not an option    DVT Prophylaxis with Venodynes.      MOLST form was completed on her prior admission and the patients  will bring it in

## 2019-08-31 NOTE — DISCHARGE NOTE PROVIDER - NSDCCPCAREPLAN_GEN_ALL_CORE_FT
PRINCIPAL DISCHARGE DIAGNOSIS  Diagnosis: Acute GI bleeding  Assessment and Plan of Treatment:       SECONDARY DISCHARGE DIAGNOSES  Diagnosis: Acute cystitis  Assessment and Plan of Treatment:

## 2019-08-31 NOTE — CONSULT NOTE ADULT - SUBJECTIVE AND OBJECTIVE BOX
67 year old female with PMH significantfor:                   Repair of Aortic Dissection 2009 than 2014                   Spinal Hematoma leading to Paraplegia 2015                   HTN                   chronic pain - has baclofen pump                             Possible IVC filter  Patient also with recent UGI bleed with HGB of 4.4, S/P multiple blood transfusions and discharged home 2 days ago.  Presented to ED with hypotension with lactate of 3.2. Received IV fluid in ED, lactate down to 1.9 this am. No obvious GI bleed. No hematemesis or melena. No nausea, vomiting, abd pain, fever or chills.   Her BP was low at home asper patient. She was seen by ICU, no need for ICU admission. Her blood pressure improved.   She also received IV rocephin for UTI. (29 Aug 2019 08:57)    Scoped byGI who saw stomach filled with blood, unable to localize and see acrtive bleeder      Patient underwent CTA, unable to localize bleed as well.    IR Consulted, unable to cross celiac axis based on imaging.    Currently patient in ICU      ICU Vital Signs Last 24 Hrs  T(C): 36.9 (30 Aug 2019 23:15), Max: 37.3 (30 Aug 2019 18:00)  T(F): 98.5 (30 Aug 2019 23:15), Max: 99.2 (30 Aug 2019 18:00)  HR: 93 (30 Aug 2019 23:15) (85 - 113)  BP: 158/72 (30 Aug 2019 23:15) (130/67 - 171/67)  BP(mean): 94 (30 Aug 2019 23:15) (83 - 94)  ABP: --  ABP(mean): --  RR: 14 (30 Aug 2019 23:15) (11 - 20)  SpO2: 99% (30 Aug 2019 23:15) (95% - 100%)      Gen aaox3 nad  cardiac s1 s2 tachycardic  lungs clear  abd soft nt nd non peritoneal  ext no edema b/l                            7.0    9.74  )-----------( 306      ( 30 Aug 2019 18:14 )             21.4   08-30    143  |  107  |  32<H>  ----------------------------<  91  4.4   |  28  |  0.56    Ca    8.3<L>      30 Aug 2019 07:02    < from: CT Angio Abdomen and Pelvis w/ IV Cont (08.30.19 @ 17:52) >      PROCEDURE DATE:  08/30/2019          INTERPRETATION:  CLINICAL INFORMATION: Upper GI bleed.    COMPARISON: CT abdomen and pelvis 8/29/2019.    PROCEDURE:   CT of the Abdomen and Pelvis was performed with and without intravenous   contrast.  Precontrast, Arterial and Delayed phases were performed.  Intravenous contrast: 90 ml Omnipaque 350. 10 ml discarded.  Oral contrast: None.  Sagittal and coronal reformats were performed.    FINDINGS:    LOWER CHEST: There is pleural calcification versus suture material in the   imaged left lung base. There is mild posterior dependent atelectasis.   There is mild bronchiectasis. There is atelectasis in the lingula.    LIVER: Within normal limits.  BILE DUCTS: Normal caliber.  GALLBLADDER: Layering hyperdensity within the gallbladder likely   represents vicarious excretion of contrast.  SPLEEN: Within normal limits.  PANCREAS: Within normal limits.  ADRENALS: Within normal limits.  KIDNEYS/URETERS: Atrophic left kidney with mildly decreased enhancement.   There is a left ureteral stent again noted, appropriately positioned. A   large 1.2 x 0.8 cm calculus in the mid left ureter is unchanged. Punctate   nonobstructing calculi again noted in the left kidney.    BLADDER: Distended with distal tip of left ureteral stent.  REPRODUCTIVE ORGANS: Uterus and adnexa within normal limits.    BOWEL: There is no evidence for bleed/extravasation into the bowel. No   hypervascular bowel lesions are identified. No bowel obstruction.   Appendix is not visualized. No definite evidence of inflammation in the   pericecal region.  PERITONEUM: No ascites.  VESSELS: Again noted is a aneurysmally dilated and tortuous descending   thoracic aorta. Stable abdominal aortic dissection is again noted   extending into the right iliac artery. Tortuous collateral vessels are   again noted in the upper abdomen in the periportal and perisplenic   regions. An IVC filter is again noted.  RETROPERITONEUM/LYMPH NODES: No lymphadenopathy.    ABDOMINAL WALL: Mild diffuse soft tissue anasarca. Generator pack for a   stimulator is present in the left gluteal area. Another stimulator   generator pack in the right lower anterior abdominal wall.  BONES: Median sternotomy wires. Degenerative change of the lumbar spine.    IMPRESSION:     No CT evidence for active gastrointestinal bleed.    Stable abdominal aortic dissection.    Other incidental findings are as above.              < end of copied text >
Patient is a 68y old  Female who presents with a chief complaint of Hypotension (29 Aug 2019 16:00)    HPI:  This patient is a 67 year old female with PMH significantfor:                   Repair of Aortic Dissection 2009 than 2014                   Spinal Hematoma leading to Paraplegia 2015                   HTN                   chronic pain - has baclofen pump                             Possible IVC filter  Patient also with recent UGI bleed with HGB of 4.4, S/P multiple blood transfusions and discharged home 2 days ago.  Presented to ED with hypotension with lactate of 3.2. Received IV fluid in ED, lactate down to 1.9 this am. No obvious GI bleed. No hematemesis or melena. No nausea, vomiting, abd pain, fever or chills.   Her BP was low at home asper patient. She was seen by ICU, no need for ICU admission. Her blood pressure improved.   She also received IV rocephin for UTI. (29 Aug 2019 08:57)    8/30/2019-  Pt feeling fine without any complaints.   She wants to go home.   Last BM was yesterday.   Unclear if any gi bleeding noted.   No n/v, CP, or SOB.     PAST MEDICAL & SURGICAL HISTORY:  Dorsalgia of lumbar region: on pain medication /baclofen po and pump  Self-catheterizes urinary bladder  Anemia: chronic  Uveitis  Osteoporosis  PAD (peripheral artery disease)  Hematoma: spinal  September  treated  September 2018  Paraplegia: on wheelchair goes to physical therapy 2 x weekly  Aortic dissection, thoracic: Type A Repaired 2009  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2018  UTI (urinary tract infection): stable x 3 months  TIA (transient ischemic attack)  HTN (Hypertension): on meds  History of corneal transplant: left corneal transplant on 5/21/2018  Disorder of spine: unthetethering 2 x  Presence of IVC filter: 2014 ?  S/P aortic dissection repair: Type A Dissection repair /2009   descending aortic aneurysm repair 9/2016  H/O Spinal surgery: laminectomies 2014    MEDICATIONS  (STANDING):  atorvastatin 40 milliGRAM(s) Oral at bedtime  baclofen 20 milliGRAM(s) Oral two times a day  cefTRIAXone Injectable. 1000 milliGRAM(s) IV Push every 24 hours  DULoxetine 20 milliGRAM(s) Oral daily  gabapentin 600 milliGRAM(s) Oral three times a day  labetalol 200 milliGRAM(s) Oral two times a day  pantoprazole  Injectable 40 milliGRAM(s) IV Push every 12 hours  polyethylene glycol 3350 17 Gram(s) Oral every 12 hours  valACYclovir 1000 milliGRAM(s) Oral daily    MEDICATIONS  (PRN):  acetaminophen   Tablet .. 650 milliGRAM(s) Oral every 6 hours PRN Temp greater or equal to 38C (100.4F), Mild Pain (1 - 3)  ondansetron Injectable 4 milliGRAM(s) IV Push every 6 hours PRN Nausea and/or Vomiting  oxyCODONE    IR 10 milliGRAM(s) Oral every 6 hours PRN Moderate Pain (4 - 6)  prednisoLONE acetate 1% Suspension 1 Drop(s) Both EYES two times a day PRN home med  sodium chloride 2% Ophthalmic Solution 1 Drop(s) Both EYES two times a day PRN dry eye  zolpidem 5 milliGRAM(s) Oral at bedtime PRN Insomnia    Allergies  No Known Allergies    FAMILY HISTORY:  Family history of Alzheimer's disease    REVIEW OF SYSTEMS:  CONSTITUTIONAL: No weakness, fevers or chills  RESPIRATORY: No cough, wheezing, hemoptysis; No shortness of breath  CARDIOVASCULAR: No chest pain or palpitations  GASTROINTESTINAL: Per HPI.     Vital Signs Last 24 Hrs  T(C): 36.6 (30 Aug 2019 05:26), Max: 36.9 (29 Aug 2019 21:03)  T(F): 97.9 (30 Aug 2019 05:26), Max: 98.5 (29 Aug 2019 21:03)  HR: 85 (30 Aug 2019 05:26) (85 - 118)  BP: 140/65 (30 Aug 2019 05:26) (140/65 - 168/96)  BP(mean): --  RR: 14 (29 Aug 2019 11:21) (14 - 16)  SpO2: 95% (30 Aug 2019 05:26) (95% - 100%)    PHYSICAL EXAM:  Constitutional: Middle aged female with multiple medical issues, paraplegic in NAD.   Respiratory: CTAB  Cardiovascular: S1 and S2, RRR, no M/G/R  Gastrointestinal: BS+, soft, NT/ND    LABS:                        7.0    7.70  )-----------( 296      ( 30 Aug 2019 07:02 )             22.4     08-30    143  |  107  |  32<H>  ----------------------------<  91  4.4   |  28  |  0.56    Ca    8.3<L>      30 Aug 2019 07:02    TPro  5.5<L>  /  Alb  2.4<L>  /  TBili  0.4  /  DBili  x   /  AST  26  /  ALT  11<L>  /  AlkPhos  72  08-28    PT/INR - ( 28 Aug 2019 22:41 )   PT: 11.3 sec;   INR: 1.02 ratio         PTT - ( 28 Aug 2019 22:41 )  PTT:26.7 sec  LIVER FUNCTIONS - ( 28 Aug 2019 22:41 )  Alb: 2.4 g/dL / Pro: 5.5 gm/dL / ALK PHOS: 72 U/L / ALT: 11 U/L / AST: 26 U/L / GGT: x             RADIOLOGY & ADDITIONAL STUDIES:
UROLOGY CONSULT    HPI: patient is 68y Female with recent admission with anemia 2/2 UGI bleed and urosepsis 2/2 left ureteral stone with hydro s/p left ureteral stent placement a couple of weeks ago.     Presented to ER today with hypotension and tachycardia. Concern for sepsis.     CT scan reviewed - ureteral stent in place    Hemodynamic parameters improved at this time. No f/c/n/v. No abdominal/flank pain. No hematuria.     PMH/PSH  Gastrointestinal hemorrhage  Family history of Alzheimer's disease  No pertinent family history in first degree relatives  Dorsalgia of lumbar region  Self-catheterizes urinary bladder  Anemia  Uveitis  Osteoporosis  PAD (peripheral artery disease)  Hematoma  Paraplegia  Aortic dissection, abdominal  Aortic dissection, thoracic  Blindness of left eye  Chronic constipation  Subdural hematoma  UTI (urinary tract infection)  TIA (transient ischemic attack)  Aortic Dissection, Abdominal  HTN (Hypertension)  History of corneal transplant  Disorder of spine  Presence of IVC filter  S/P aortic dissection repair  H/O Spinal surgery  Aneurysm Repair, Abdominal Aortic      Family History:  FAMILY HISTORY:  Family history of Alzheimer's disease      Allergies  No Known Allergies      Medications  acetaminophen   Tablet .. 650 milliGRAM(s) Oral every 6 hours PRN  atorvastatin 40 milliGRAM(s) Oral at bedtime  baclofen 20 milliGRAM(s) Oral two times a day  cefTRIAXone Injectable. 1000 milliGRAM(s) IV Push every 24 hours  DULoxetine 20 milliGRAM(s) Oral daily  gabapentin 600 milliGRAM(s) Oral three times a day  labetalol 200 milliGRAM(s) Oral two times a day  ondansetron Injectable 4 milliGRAM(s) IV Push every 6 hours PRN  oxyCODONE    IR 10 milliGRAM(s) Oral every 6 hours PRN  pantoprazole  Injectable 40 milliGRAM(s) IV Push every 12 hours  polyethylene glycol 3350 17 Gram(s) Oral every 12 hours  prednisoLONE acetate 1% Suspension 1 Drop(s) Both EYES two times a day PRN  sodium chloride 2% Ophthalmic Solution 1 Drop(s) Both EYES two times a day PRN  valACYclovir 1000 milliGRAM(s) Oral daily  zolpidem 5 milliGRAM(s) Oral at bedtime PRN      ROS  General: No fevers, chills, night sweats, fatigue, malaise  Ophthalmologic: No eye pain,   ENMT: No hearing changes	  Respiratory and Thorax: No cough, sputum, dyspnea, wheezing  Cardiovascular: No chest pain, SOB, PND, dyspnea on exertion, orthopnea, edema, palpitations  Gastrointestinal:	No diarrhea, constipation, nausea, vomiting, anorexia, hematochezia, melena.   Genitourinary: see above  Neurological:	 No syncope, loss of consciousness, headache, dizziness  Psychiatric: No SI/HI/AH/VH.  Endocrine: No temperature intolerance    Vital Signs Last 24 Hrs  T(C): 36.7 (29 Aug 2019 11:21), Max: 36.8 (29 Aug 2019 10:27)  T(F): 98 (29 Aug 2019 11:21), Max: 98.2 (29 Aug 2019 10:27)  HR: 114 (29 Aug 2019 11:21) (83 - 151)  BP: 156/80 (29 Aug 2019 11:21) (82/55 - 177/95)  BP(mean): --  RR: 14 (29 Aug 2019 11:21) (13 - 25)  SpO2: 95% (29 Aug 2019 11:21) (90% - 100%)    PHYSICAL EXAM:  Constitutional: NAD, lying in bed comfortably   Eyes: EOMI, vision is grossly intact  Back: no CVA tenderness bilaterally  Respiratory: no labored breathing  Cardiovascular: no peripheral edema, cyanosis, or pallor. Extremities are warm and well perfused.   Gastrointestinal: soft, non-tender, non-distended, no guarding, no rebound tenderness.   Genitourinary: Bladder non-palpable  Neurological:  A&O x3  Psychiatric: normal mood and affect        I/O      Labs:  CBC Full  -  ( 29 Aug 2019 11:32 )  WBC Count : 10.76 K/uL  Hemoglobin : 8.1 g/dL  Hematocrit : 25.8 %  Platelet Count - Automated : 402 K/uL  Mean Cell Volume : 89.6 fl  Mean Cell Hemoglobin : 28.1 pg  Mean Cell Hemoglobin Concentration : 31.4 gm/dL  Auto Neutrophil # : x  Auto Lymphocyte # : x  Auto Monocyte # : x  Auto Eosinophil # : x  Auto Basophil # : x  Auto Neutrophil % : x  Auto Lymphocyte % : x  Auto Monocyte % : x  Auto Eosinophil % : x  Auto Basophil % : x    08-28    145  |  110<H>  |  18  ----------------------------<  161<H>  4.9   |  26  |  0.76    Ca    7.9<L>      28 Aug 2019 22:41    TPro  5.5<L>  /  Alb  2.4<L>  /  TBili  0.4  /  DBili  x   /  AST  26  /  ALT  11<L>  /  AlkPhos  72  08-28
· Chief Complaint: The patient is a 68y Female complaining of hypotension.	  · HPI Objective Statement: 69 y/o female with a PMHx of Dorsalgia of lumbar region, anemia, peripheral artery disease, hematoma, paraplegia, aortic dissection, UTI, TIA, HTN presents to the ED c/o hypotension. Pt c/o hypotension and tachycardia. Per EMS, pt was DC'd from [facility] after GI bleed x2 days ago. +Nausea. +Vomiting. denies SOB, CP, abdominal pain. Did not have urinary sx last visit. As per , she is lethargic. Hx received from . PCP: Basil Branham.  At the time of exam the patient was conversive, NAD s/p 2 liters saline and normalization of Vitals.	  	  	  Vital Signs Last 24 Hrs T(C): 36.2 (28 Aug 2019 22:34), Max: 36.2 (28 Aug 2019 22:34) T(F): 97.1 (28 Aug 2019 22:34), Max: 97.1 (28 Aug 2019 22:34) HR: 126 (28 Aug 2019 23:30) (126 - 151) BP: 123/65 (28 Aug 2019 23:30) (82/55 - 123/65) BP(mean): -- RR: 20 (28 Aug 2019 23:33) (16 - 25) SpO2: 97% (28 Aug 2019 23:33) (90% - 97%)  room air	  	  	  	    PAST MEDICAL/SURGICAL/FAMILY/SOCIAL HISTORY:    Past Medical History:  Anemia  chronic  Aortic dissection, thoracic  Type A Repaired 2009  Blindness of left eye  hx corneal transplant 2018  Aug.2018  Dorsalgia of lumbar region  on pain medication /baclofen po and pump  Hematoma  spinal  September  treated  September 2018  HTN (Hypertension)  on meds  Osteoporosis    PAD (peripheral artery disease)    Paraplegia  on wheelchair goes to physical therapy 2 x weekly  Self-catheterizes urinary bladder    TIA (transient ischemic attack)    UTI (urinary tract infection)  stable x 3 months  Uveitis.     Past Surgical History:  Disorder of spine  unthetethering 2 x  H/O Spinal surgery  laminectomies 2014  History of corneal transplant  left corneal transplant on 5/21/2018  Presence of IVC filter  2014 ?  S/P aortic dissection repair  Type A Dissection repair /2009   descending aortic aneurysm repair 9/2016.    PE  NAD  Neck- - JVD  Lungs- good air entry, non labored breathing  Heart- RRR  Abd- non tender, non distended                                8.4    16.31 )-----------( 612      ( 28 Aug 2019 22:41 )             27.0         08-28    145  |  110<H>  |  18  ----------------------------<  161<H>  4.9   |  26  |  0.76    Ca    7.9<L>      28 Aug 2019 22:41    TPro  5.5<L>  /  Alb  2.4<L>  /  TBili  0.4  /  DBili  x   /  AST  26  /  ALT  11<L>  /  AlkPhos  72  08-28    LIVER FUNCTIONS - ( 28 Aug 2019 22:41 )  Alb: 2.4 g/dL / Pro: 5.5 gm/dL / ALK PHOS: 72 U/L / ALT: 11 U/L / AST: 26 U/L / GGT: x             PT/INR - ( 28 Aug 2019 22:41 )   PT: 11.3 sec;   INR: 1.02 ratio         PTT - ( 28 Aug 2019 22:41 )  PTT:26.7 sec
Patient is a 68y old  Female who presents with a chief complaint of Hypotension (31 Aug 2019 12:55)    HPI:  This patient is a 67 year old female with PMH significant for aortic dissection with repair, spinal hematoma with paraplegia, corneal transplant and left eye blindness,  hypertension, chronic pain, recent admission with GI bleed admitted on 8/29 for evaluation of low blood pressure and elevated lactate. The patient denies any urinary complaints, however yeast is growing in urine culture. She has had issues with melena on this admission, no fever or chills.               PMH: as above  PSH: as above  Meds: per reconciliation sheet, noted below  MEDICATIONS  (STANDING):  atorvastatin 40 milliGRAM(s) Oral at bedtime  baclofen 20 milliGRAM(s) Oral two times a day  DULoxetine 20 milliGRAM(s) Oral daily  fluconAZOLE IVPB 100 milliGRAM(s) IV Intermittent every 24 hours  gabapentin 600 milliGRAM(s) Oral three times a day  labetalol 200 milliGRAM(s) Oral two times a day  pantoprazole Infusion 8 mG/Hr (10 mL/Hr) IV Continuous <Continuous>  polyethylene glycol 3350 17 Gram(s) Oral every 12 hours  valACYclovir 1000 milliGRAM(s) Oral daily    MEDICATIONS  (PRN):  acetaminophen   Tablet .. 650 milliGRAM(s) Oral every 6 hours PRN Temp greater or equal to 38C (100.4F), Mild Pain (1 - 3)  morphine  - Injectable 2 milliGRAM(s) IV Push every 4 hours PRN Moderate Pain (4 - 6)  ondansetron Injectable 4 milliGRAM(s) IV Push every 6 hours PRN Nausea and/or Vomiting  prednisoLONE acetate 1% Suspension 1 Drop(s) Both EYES two times a day PRN home med  sodium chloride 2% Ophthalmic Solution 1 Drop(s) Both EYES two times a day PRN dry eye  zolpidem 5 milliGRAM(s) Oral at bedtime PRN Insomnia    Allergies    No Known Allergies    Intolerances      Social: no smoking, no alcohol, no illegal drugs; no recent travel, no exposure to TB  FAMILY HISTORY:  Family history of Alzheimer's disease       ROS: the patient denies fever, no chills, no HA, no dizziness, no sore throat, no blurry vision, no CP, no palpitations, no SOB, no cough, no abdominal pain, no diarrhea, no N/V, no dysuria, no leg pain, no claudication, no rash, no joint aches, no rectal pain or bleeding, no night sweats  All other systems reviewed and are negative    Vital Signs Last 24 Hrs  T(C): 36.8 (31 Aug 2019 10:00), Max: 37.3 (30 Aug 2019 18:00)  T(F): 98.3 (31 Aug 2019 10:00), Max: 99.2 (30 Aug 2019 18:00)  HR: 79 (31 Aug 2019 15:00) (76 - 113)  BP: 145/62 (31 Aug 2019 15:00) (116/42 - 173/84)  BP(mean): 82 (31 Aug 2019 15:00) (60 - 113)  RR: 14 (31 Aug 2019 15:00) (9 - 20)  SpO2: 100% (31 Aug 2019 15:00) (98% - 100%)  Daily     Daily     PE:    Constitutional: frail looking  HEENT: NC/AT,  ears and nose atraumatic; pharynx clear  Neck: supple; thyroid not palpable  Back: no tenderness  Respiratory: respiratory effort normal; clear to auscultation  Cardiovascular: S1S2 regular, no murmurs  Abdomen: soft, not tender, not distended, positive BS; no liver or spleen organomegaly  Genitourinary: no suprapubic tenderness  Musculoskeletal: no muscle tenderness, no joint swelling or tenderness  Neurological/ Psychiatric: AxOx3, judgement and insight normal;  moving all extremities  Skin: no rashes; no palpable lesions    Labs: all available labs reviewed                        8.8    6.57  )-----------( 230      ( 31 Aug 2019 15:03 )             26.7     08-31    142  |  110<H>  |  35<H>  ----------------------------<  99  4.2   |  25  |  0.30<L>    Ca    8.1<L>      31 Aug 2019 07:04  Phos  3.0     08-31  Mg     2.0     08-31       Culture - Urine (08.28.19 @ 23:57)    Specimen Source: .Urine Clean Catch (Midstream)    Culture Results:   10,000 - 49,000 CFU/mL Presumptive Candida albicans            Radiology: all available radiological tests reviewed    Advanced directives addressed: full resuscitation

## 2019-08-31 NOTE — DISCHARGE NOTE PROVIDER - NSDCFUSCHEDAPPT_GEN_ALL_CORE_FT
BRENT MONSALVE ; 09/05/2019 ; [facility] BRENT MONSALVE ; 09/05/2019 ; [facility] BRENT MONSALVE ; 09/05/2019 ; [facility]

## 2019-08-31 NOTE — CONSULT NOTE ADULT - ASSESSMENT
This patient is a 67 year old female with PMH significant for aortic dissection with repair, spinal hematoma with paraplegia, corneal transplant and left eye blindness,  hypertension, chronic pain, recent admission with GI bleed admitted on 8/29 for evaluation of low blood pressure and elevated lactate. The patient denies any urinary complaints, however yeast is growing in urine culture. She has had issues with melena on this admission, no fever or chills.     1. Patient with multiple medical problems, had recently completed course of antibiotics for multiple bacterial pathogens in urine; now growing Candida in urine  - will treat with 3 days diflucan 100 mg q day  - she is having workup for GIB  - iv hydration and supportive care   - icu care  2. other issues: per medicine  If further ID issues please reconsult

## 2019-08-31 NOTE — PROGRESS NOTE ADULT - SUBJECTIVE AND OBJECTIVE BOX
CC: Patient is a 68y old  Female who presents with a chief complaint of Hypotension (31 Aug 2019 09:10)      Subjective:  NPO  passing a small amount of melena      Physical Exam:  Vital Signs Last 24 Hrs  T(C): 36.8 (31 Aug 2019 10:00), Max: 37.3 (30 Aug 2019 18:00)  T(F): 98.3 (31 Aug 2019 10:00), Max: 99.2 (30 Aug 2019 18:00)  HR: 80 (31 Aug 2019 13:00) (76 - 113)  BP: 146/70 (31 Aug 2019 13:00) (116/42 - 173/84)  BP(mean): 89 (31 Aug 2019 13:00) (60 - 113)  RR: 12 (31 Aug 2019 13:00) (9 - 20)  SpO2: 100% (31 Aug 2019 13:00) (98% - 100%)    A+Ox3  skin is no longer pale  soft / NT / ND      Labs:                        9.2    8.42  )-----------( 271      ( 31 Aug 2019 07:04 )             28.2       08-31    142  |  110<H>  |  35<H>  ----------------------------<  99  4.2   |  25  |  0.30<L>    Ca    8.1<L>      31 Aug 2019 07:04  Phos  3.0     08-31  Mg     2.0     08-31      Radiology:      Meds:  MEDICATIONS  (STANDING):  atorvastatin 40 milliGRAM(s) Oral at bedtime  baclofen 20 milliGRAM(s) Oral two times a day  cefTRIAXone Injectable. 1000 milliGRAM(s) IV Push every 24 hours  DULoxetine 20 milliGRAM(s) Oral daily  gabapentin 600 milliGRAM(s) Oral three times a day  labetalol 200 milliGRAM(s) Oral two times a day  pantoprazole Infusion 8 mG/Hr (10 mL/Hr) IV Continuous <Continuous>  polyethylene glycol 3350 17 Gram(s) Oral every 12 hours  valACYclovir 1000 milliGRAM(s) Oral daily    MEDICATIONS  (PRN):  acetaminophen   Tablet .. 650 milliGRAM(s) Oral every 6 hours PRN Temp greater or equal to 38C (100.4F), Mild Pain (1 - 3)  morphine  - Injectable 2 milliGRAM(s) IV Push every 4 hours PRN Moderate Pain (4 - 6)  ondansetron Injectable 4 milliGRAM(s) IV Push every 6 hours PRN Nausea and/or Vomiting  prednisoLONE acetate 1% Suspension 1 Drop(s) Both EYES two times a day PRN home med  sodium chloride 2% Ophthalmic Solution 1 Drop(s) Both EYES two times a day PRN dry eye  zolpidem 5 milliGRAM(s) Oral at bedtime PRN Insomnia

## 2019-09-01 NOTE — PROGRESS NOTE ADULT - SUBJECTIVE AND OBJECTIVE BOX
Patient is a 68y old  Female who presents with a chief complaint of Hypotension (31 Aug 2019 17:46)      HPI:  This patient is a 67 year old female with PMH significantfor:                   Repair of Aortic Dissection 2009 than 2014                   Spinal Hematoma leading to Paraplegia 2015                   HTN                   chronic pain - has baclofen pump                             Possible IVC filter  Patient also with recent UGI bleed with HGB of 4.4, S/P multiple blood transfusions and discharged home 2 days ago.  Presented to ED with hypotension with lactate of 3.2. Received IV fluid in ED, lactate down to 1.9 this am. No obvious GI bleed. No hematemesis or melena. No nausea, vomiting, abd pain, fever or chills.   Her BP was low at home asper patient. She was seen by ICU, no need for ICU admission. Her blood pressure improved.   She also received IV rocephin for UTI. (29 Aug 2019 08:57)      Patient currently comfortable. No obvious bleeding. Awaiting transfer to [place].    PAST MEDICAL & SURGICAL HISTORY:  Dorsalgia of lumbar region: on pain medication /baclofen po and pump  Self-catheterizes urinary bladder  Anemia: chronic  Uveitis  Osteoporosis  PAD (peripheral artery disease)  Hematoma: spinal  September  treated  September 2018  Paraplegia: on wheelchair goes to physical therapy 2 x weekly  Aortic dissection, thoracic: Type A Repaired 2009  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2018  UTI (urinary tract infection): stable x 3 months  TIA (transient ischemic attack)  HTN (Hypertension): on meds  History of corneal transplant: left corneal transplant on 5/21/2018  Disorder of spine: unthetethering 2 x  Presence of IVC filter: 2014 ?  S/P aortic dissection repair: Type A Dissection repair /2009   descending aortic aneurysm repair 9/2016  H/O Spinal surgery: laminectomies 2014      MEDICATIONS  (STANDING):  atorvastatin 40 milliGRAM(s) Oral at bedtime  baclofen 20 milliGRAM(s) Oral two times a day  DULoxetine 20 milliGRAM(s) Oral daily  fluconAZOLE IVPB 100 milliGRAM(s) IV Intermittent every 24 hours  gabapentin 600 milliGRAM(s) Oral three times a day  labetalol 200 milliGRAM(s) Oral two times a day  pantoprazole Infusion 8 mG/Hr (10 mL/Hr) IV Continuous <Continuous>  polyethylene glycol 3350 17 Gram(s) Oral every 12 hours  valACYclovir 1000 milliGRAM(s) Oral daily    MEDICATIONS  (PRN):  acetaminophen   Tablet .. 650 milliGRAM(s) Oral every 6 hours PRN Temp greater or equal to 38C (100.4F), Mild Pain (1 - 3)  morphine  - Injectable 2 milliGRAM(s) IV Push every 4 hours PRN Moderate Pain (4 - 6)  ondansetron Injectable 4 milliGRAM(s) IV Push every 6 hours PRN Nausea and/or Vomiting  prednisoLONE acetate 1% Suspension 1 Drop(s) Both EYES two times a day PRN home med  sodium chloride 2% Ophthalmic Solution 1 Drop(s) Both EYES two times a day PRN dry eye  zolpidem 5 milliGRAM(s) Oral at bedtime PRN Insomnia      Allergies    No Known Allergies    Intolerances        Vital Signs Last 24 Hrs  T(C): 36.8 (01 Sep 2019 06:00), Max: 37.3 (31 Aug 2019 16:00)  T(F): 98.2 (01 Sep 2019 06:00), Max: 99.2 (31 Aug 2019 16:00)  HR: 83 (01 Sep 2019 06:00) (70 - 90)  BP: 117/58 (01 Sep 2019 06:00) (116/42 - 158/70)  BP(mean): 71 (01 Sep 2019 06:00) (60 - 92)  RR: 10 (01 Sep 2019 06:00) (10 - 16)  SpO2: 95% (01 Sep 2019 06:00) (93% - 100%)    PHYSICAL EXAM:    Respiratory: CTAB  Cardiovascular: S1 and S2, RRR, no M/G/R  Gastrointestinal: BS+, soft, NT/ND      LABS:                        9.0    6.91  )-----------( 211      ( 01 Sep 2019 06:37 )             27.6     09-01    141  |  111<H>  |  20  ----------------------------<  95  4.2   |  24  |  0.42<L>    Ca    8.6      01 Sep 2019 06:39  Phos  3.7     09-01  Mg     2.1     09-01            RADIOLOGY & ADDITIONAL STUDIES:

## 2019-09-01 NOTE — PROGRESS NOTE ADULT - ASSESSMENT
A/P: 68 female with a recurrent UGIB and anemia post hemorrhage          Plan:  ICU    PULM: No active issues    Cardio: BP stable    Renal: Elevated BUN/Cr ratio likely from the UGIB    GI: Start clears, CBC q6h, PPI Drip, Possible OR today.  IR Not an option.  Surgery following and bleeding reoccurs she will go to the OR.  Patient requesting to be transferred to [place] where the majority of her care has been given    DVT Prophylaxis with Venodynes.      MOLST form was completed on her prior admission and the patients  will bring it in

## 2019-09-01 NOTE — PROGRESS NOTE ADULT - ASSESSMENT
69 yo female with history of massive upper GI bleeding. Currently stable. Awaiting transfer to [place].

## 2019-09-01 NOTE — PROGRESS NOTE ADULT - ASSESSMENT
UGI bleed  -awaiting transfer to [facility] for tertiary care services related to significant medical / surgical comorbidities  -no evidence of ongoing bleeding based on stable VS and hgb  -start clear liquid diet  -if she has recurrent UGI bleed, I remain available to perform gastrotomy with bleeding control

## 2019-09-01 NOTE — PROGRESS NOTE ADULT - NSHPATTENDINGPLANDISCUSS_GEN_ALL_CORE
patient and her  at bedside. Basil Anderson (PCP) and his partner Jerod Lr by phone. Dr Wolf
Pt, RN, GI
Dr Wolf

## 2019-09-01 NOTE — PROGRESS NOTE ADULT - SUBJECTIVE AND OBJECTIVE BOX
CC: Patient is a 68y old  Female who presents with a chief complaint of Hypotension (01 Sep 2019 09:39)      Subjective:  NPO w/o nausea or vomiting  having small BM's, but not known if they are still melanotic      Physical Exam:  Vital Signs Last 24 Hrs  T(C): 37.1 (01 Sep 2019 10:00), Max: 37.3 (31 Aug 2019 16:00)  T(F): 98.7 (01 Sep 2019 10:00), Max: 99.2 (31 Aug 2019 16:00)  HR: 67 (01 Sep 2019 10:00) (67 - 90)  BP: 126/57 (01 Sep 2019 10:00) (108/49 - 158/70)  BP(mean): 75 (01 Sep 2019 10:00) (54 - 92)  RR: 14 (01 Sep 2019 10:00) (10 - 16)  SpO2: 95% (01 Sep 2019 10:00) (93% - 100%)    soft / NT / ND      Labs:                        9.0    6.91  )-----------( 211      ( 01 Sep 2019 06:37 )             27.6       09-01    141  |  111<H>  |  20  ----------------------------<  95  4.2   |  24  |  0.42<L>    Ca    8.6      01 Sep 2019 06:39  Phos  3.7     09-01  Mg     2.1     09-01      Radiology:      Meds:  MEDICATIONS  (STANDING):  atorvastatin 40 milliGRAM(s) Oral at bedtime  baclofen 20 milliGRAM(s) Oral two times a day  DULoxetine 20 milliGRAM(s) Oral daily  fluconAZOLE IVPB 100 milliGRAM(s) IV Intermittent every 24 hours  gabapentin 600 milliGRAM(s) Oral three times a day  labetalol 200 milliGRAM(s) Oral two times a day  pantoprazole Infusion 8 mG/Hr (10 mL/Hr) IV Continuous <Continuous>  polyethylene glycol 3350 17 Gram(s) Oral every 12 hours  valACYclovir 1000 milliGRAM(s) Oral daily    MEDICATIONS  (PRN):  acetaminophen   Tablet .. 650 milliGRAM(s) Oral every 6 hours PRN Temp greater or equal to 38C (100.4F), Mild Pain (1 - 3)  morphine  - Injectable 2 milliGRAM(s) IV Push every 4 hours PRN Moderate Pain (4 - 6)  ondansetron Injectable 4 milliGRAM(s) IV Push every 6 hours PRN Nausea and/or Vomiting  prednisoLONE acetate 1% Suspension 1 Drop(s) Both EYES two times a day PRN home med  sodium chloride 2% Ophthalmic Solution 1 Drop(s) Both EYES two times a day PRN dry eye  zolpidem 5 milliGRAM(s) Oral at bedtime PRN Insomnia CC: Patient is a 68y old  Female who presents with a chief complaint of Hypotension (01 Sep 2019 09:39)      Subjective:  NPO w/o nausea or vomiting  having small BM's, but not known if they are still melanotic  hungry and thirsty  on PPI infusion      Physical Exam:  Vital Signs Last 24 Hrs  T(C): 37.1 (01 Sep 2019 10:00), Max: 37.3 (31 Aug 2019 16:00)  T(F): 98.7 (01 Sep 2019 10:00), Max: 99.2 (31 Aug 2019 16:00)  HR: 67 (01 Sep 2019 10:00) (67 - 90)  BP: 126/57 (01 Sep 2019 10:00) (108/49 - 158/70)  BP(mean): 75 (01 Sep 2019 10:00) (54 - 92)  RR: 14 (01 Sep 2019 10:00) (10 - 16)  SpO2: 95% (01 Sep 2019 10:00) (93% - 100%)    A+Ox3  calm  no pallor  soft / NT / ND      Labs:                        9.0    6.91  )-----------( 211      ( 01 Sep 2019 06:37 )             27.6       09-01    141  |  111<H>  |  20  ----------------------------<  95  4.2   |  24  |  0.42<L>    Ca    8.6      01 Sep 2019 06:39  Phos  3.7     09-01  Mg     2.1     09-01      Radiology:      Meds:  MEDICATIONS  (STANDING):  atorvastatin 40 milliGRAM(s) Oral at bedtime  baclofen 20 milliGRAM(s) Oral two times a day  DULoxetine 20 milliGRAM(s) Oral daily  fluconAZOLE IVPB 100 milliGRAM(s) IV Intermittent every 24 hours  gabapentin 600 milliGRAM(s) Oral three times a day  labetalol 200 milliGRAM(s) Oral two times a day  pantoprazole Infusion 8 mG/Hr (10 mL/Hr) IV Continuous <Continuous>  polyethylene glycol 3350 17 Gram(s) Oral every 12 hours  valACYclovir 1000 milliGRAM(s) Oral daily    MEDICATIONS  (PRN):  acetaminophen   Tablet .. 650 milliGRAM(s) Oral every 6 hours PRN Temp greater or equal to 38C (100.4F), Mild Pain (1 - 3)  morphine  - Injectable 2 milliGRAM(s) IV Push every 4 hours PRN Moderate Pain (4 - 6)  ondansetron Injectable 4 milliGRAM(s) IV Push every 6 hours PRN Nausea and/or Vomiting  prednisoLONE acetate 1% Suspension 1 Drop(s) Both EYES two times a day PRN home med  sodium chloride 2% Ophthalmic Solution 1 Drop(s) Both EYES two times a day PRN dry eye  zolpidem 5 milliGRAM(s) Oral at bedtime PRN Insomnia

## 2019-09-01 NOTE — PROGRESS NOTE ADULT - SUBJECTIVE AND OBJECTIVE BOX
Patient is a 67 year old female Patient also with recent UGI bleed.  She presented to ED with hypotension with lactate of 3.2. Received IV fluid in ED, lactate down to 1.9 this am. No obvious GI bleed. No hematemesis or melena.   Today her hgb fell to 7 and was given 2 U PRBC and in the afternoon developed melena.  Dr. Yoo did and EGD and again found fresh blood and clot in her proximal stomach.  Post EGD she hads developed tachycardia but with a preserved BP.  Patient is going for a STAT CT angio and then to the ICU.      8/31: Patient seen in bed.  S/P 2 U over night.    9/1: H & H has been stable          PAST MEDICAL & SURGICAL HISTORY:  Dorsalgia of lumbar region: on pain medication /baclofen po and pump  Self-catheterizes urinary bladder  Anemia: chronic  Uveitis  Osteoporosis  PAD (peripheral artery disease)  Hematoma: spinal  September  treated  September 2018  Paraplegia: on wheelchair goes to physical therapy 2 x weekly  Aortic dissection, thoracic: Type A Repaired 2009  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2018  UTI (urinary tract infection): stable x 3 months  TIA (transient ischemic attack)  HTN (Hypertension): on meds  History of corneal transplant: left corneal transplant on 5/21/2018  Disorder of spine: unthetethering 2 x  Presence of IVC filter: 2014 ?  S/P aortic dissection repair: Type A Dissection repair /2009   descending aortic aneurysm repair 9/2016  H/O Spinal surgery: laminectomies 2014      FAMILY HISTORY:  Family history of Alzheimer's disease      Social Hx:    Allergies    No Known Allergies    Intolerances            ICU Vital Signs Last 24 Hrs  T(C): 36.8 (01 Sep 2019 06:00), Max: 37.3 (31 Aug 2019 16:00)  T(F): 98.2 (01 Sep 2019 06:00), Max: 99.2 (31 Aug 2019 16:00)  HR: 68 (01 Sep 2019 09:00) (68 - 90)  BP: 133/51 (01 Sep 2019 09:00) (108/49 - 158/70)  BP(mean): 73 (01 Sep 2019 09:00) (54 - 92)  ABP: --  ABP(mean): --  RR: 14 (01 Sep 2019 09:00) (10 - 16)  SpO2: 93% (01 Sep 2019 09:00) (93% - 100%)          I&O's Summary    31 Aug 2019 07:01  -  01 Sep 2019 07:00  --------------------------------------------------------  IN: 248 mL / OUT: 1600 mL / NET: -1352 mL                              9.0    6.91  )-----------( 211      ( 01 Sep 2019 06:37 )             27.6       09-01    141  |  111<H>  |  20  ----------------------------<  95  4.2   |  24  |  0.42<L>    Ca    8.6      01 Sep 2019 06:39  Phos  3.7     09-01  Mg     2.1     09-01                      MEDICATIONS  (STANDING):  atorvastatin 40 milliGRAM(s) Oral at bedtime  baclofen 20 milliGRAM(s) Oral two times a day  DULoxetine 20 milliGRAM(s) Oral daily  fluconAZOLE IVPB 100 milliGRAM(s) IV Intermittent every 24 hours  gabapentin 600 milliGRAM(s) Oral three times a day  labetalol 200 milliGRAM(s) Oral two times a day  pantoprazole Infusion 8 mG/Hr (10 mL/Hr) IV Continuous <Continuous>  polyethylene glycol 3350 17 Gram(s) Oral every 12 hours  valACYclovir 1000 milliGRAM(s) Oral daily    MEDICATIONS  (PRN):  acetaminophen   Tablet .. 650 milliGRAM(s) Oral every 6 hours PRN Temp greater or equal to 38C (100.4F), Mild Pain (1 - 3)  morphine  - Injectable 2 milliGRAM(s) IV Push every 4 hours PRN Moderate Pain (4 - 6)  ondansetron Injectable 4 milliGRAM(s) IV Push every 6 hours PRN Nausea and/or Vomiting  prednisoLONE acetate 1% Suspension 1 Drop(s) Both EYES two times a day PRN home med  sodium chloride 2% Ophthalmic Solution 1 Drop(s) Both EYES two times a day PRN dry eye  zolpidem 5 milliGRAM(s) Oral at bedtime PRN Insomnia      DVT Prophylaxis: V    Advanced Directives:  Discussed with:    Visit Information: 30 min    ** Time is exclusive of billed procedures and/or teaching and/or routine family updates.

## 2019-09-02 NOTE — DIETITIAN INITIAL EVALUATION ADULT. - OTHER INFO
66yo female p/w hypotension with lactate of 3.2.  Pt with recurrent UGI bleed requiring PRBC transfusion.  Pt pending transfer to [place] for ex-lap, gastric exploration, control of hemorrhage to prevent potentially fatal recurrent UGI bleed.  pt with black stools on 9/2.

## 2019-09-02 NOTE — PROGRESS NOTE ADULT - ASSESSMENT
A/P:  GI bleed  H/H stable  Serial abd exams  Monitor for GI bleed  Cont current care and meds  Gi on consult  F/U labs

## 2019-09-02 NOTE — DIETITIAN INITIAL EVALUATION ADULT. - FACTORS AFF FOOD INTAKE
Subjective:    Here to followup after adenotonsillectomy and tube placement    Patient ID: Park Castillo is a 7 y.o. female.    Chief Complaint:  Recent adenotonsillectomy and tube placement 7/12/19     Park Castillo is a 7 y.o. female here to see me today after a recent adenotonsillectomy and tube placement.   Following surgery, she is doing quite well at this time.  She experienced pain for 3 days, but is no longer requiring any oral pain medicine.  She has resumed a regular diet and all normal activities.  She did not experience any postoperative bleeding or other complications.  They did not see any drainage from either ear and she's not complaining of any ear pain.  No snoring.  They have no specific questions or concerns today.    Review of Systems   Constitutional: Negative for fever and fatigue.   HENT: Negative for ear pain, mouth sores, sore throat, trouble swallowing and voice change.    Respiratory: Negative for cough.    Cardiovascular: Negative for chest pain.   Neurological: Negative for headaches.       Objective:     Physical Exam   Constitutional: She appears well-developed and well-nourished.   HENT:   Head: Normocephalic.   Right Ear: Tympanic membrane, external ear and ear canal normal. Tympanic membrane is not erythematous.  PE tube intact and patent.  Left Ear: Tympanic membrane, external ear and ear canal normal. Tympanic membrane is not erythematous. PE tube intact and patent.  Nose: Nose normal. No rhinorrhea.   Mouth/Throat: Uvula is midline and oropharynx is clear and moist. No trismus in the jaw. Normal dentition. No uvula swelling.   Status post tonsillectomy, tonsillar fossae healing well   Lymphadenopathy:     She has no cervical adenopathy.       Assessment:     1. Recurrent acute otitis media of both ears    2. Adenotonsillar hypertrophy    3. Sleep disorder breathing        Plan:       1. Recurrent acute otitis media of both ears    2. Adenotonsillar hypertrophy    3.  Sleep disorder breathing      Now status post adenotonsillectomy and tube placement and doing well.  Patient is doing very well after recent placement of ear tubes in the operating room.  We reviewed again that on average tubes stay in the ear for six months to one year.  I would like to see the child back in six months for routine followup, or sooner if issues arise.  We also discussed that ear plugs are not necessary for splashing or bathing, only if the child will be submerging their head under several feet of water.       other (specify)/reports consuming 3 meals/day with good appetite PTA.

## 2019-09-02 NOTE — DIETITIAN INITIAL EVALUATION ADULT. - ENERGY INTAKE
pt has been consuming <1/4 of each full liquid tray; meeting <50% of ENN x 5 days. pt dislikes oral supplements, refuses anything but milkshakes. Poor (<50%)

## 2019-09-02 NOTE — PROGRESS NOTE ADULT - ASSESSMENT
A/P: 68 female with a recurrent UGIB and anemia post hemorrhage          Plan:  ICU    PULM: No active issues    Cardio: BP stable    Renal: Elevated BUN/Cr ratio was likely from the UGIB    GI: Started clears, CBC q6h, PPI Drip, Possible OR if she rebleeds.  IR Not an option.  Surgery following and bleeding reoccurs she will go to the OR.  Patient requesting to be transferred to [place] where the majority of her care has been given.  Patient has been accepted and will go once a bed is available    DVT Prophylaxis with Venodynes.      Patient needs her Baclofen pump refilled--call can be placed on 9/3     MOLST form was completed on her prior admission and the patients  will bring it in A/P: 68 female with a recurrent UGIB and anemia post hemorrhage    Severe protein calorie malnutrition  Functional quadriplegia       Plan:  ICU    PULM: No active issues    Cardio: BP stable    Renal: Elevated BUN/Cr ratio was likely from the UGIB    GI: Started clears, CBC q6h, PPI Drip, Possible OR if she rebleeds.  IR Not an option.  Surgery following and bleeding reoccurs she will go to the OR.  Patient requesting to be transferred to [place] where the majority of her care has been given.  Patient has been accepted and will go once a bed is available    ID: Diflucan for yeast in her urine    DVT Prophylaxis with Venodynes.      Patient needs her Baclofen pump refilled--call can be placed on 9/3     MOLST form was completed on her prior admission and the patients  will bring it in A/P: 68 female with a recurrent UGIB and anemia post hemorrhage    Severe protein calorie malnutrition  Functional quadriplegia       Plan:  ICU    PULM: No active issues    Cardio: BP stable    Renal: Elevated BUN/Cr ratio was likely from the UGIB    GI: Started clears, CBC q6h, PPI Drip, Possible OR if she rebleeds.  IR Not an option.  Surgery following and bleeding reoccurs she will go to the OR.  Patient requesting to be transferred to [place] where the majority of her care has been given.  Patient has been accepted and will go once a bed is available    ID: Diflucan for yeast in her urine    DVT Prophylaxis with Venodynes.      Patient needs her Baclofen pump refilled--call can be placed on 9/3     MOLST form was completed on her prior admission and the patients  will bring it in    Called The transfer center at 9AM and there is still no bed available at [place]

## 2019-09-02 NOTE — DIETITIAN INITIAL EVALUATION ADULT. - PHYSICAL APPEARANCE
other (specify) NFPE significant for severe muscle wasting; temporal, clavicle, shoulder.  mild fat wasting; orbital, tricep.  mild Lt/Rt foot/ankle edema.  manas score of 12, stage 2 PU on sacrum.

## 2019-09-02 NOTE — DIETITIAN INITIAL EVALUATION ADULT. - PERTINENT MEDS FT
MEDICATIONS  (STANDING):  atorvastatin 40 milliGRAM(s) Oral at bedtime  baclofen 20 milliGRAM(s) Oral two times a day  DULoxetine 20 milliGRAM(s) Oral daily  fluconAZOLE IVPB 100 milliGRAM(s) IV Intermittent every 24 hours  gabapentin 600 milliGRAM(s) Oral three times a day  labetalol 200 milliGRAM(s) Oral two times a day  pantoprazole Infusion 8 mG/Hr (10 mL/Hr) IV Continuous <Continuous>  polyethylene glycol 3350 17 Gram(s) Oral every 12 hours  valACYclovir 1000 milliGRAM(s) Oral daily    MEDICATIONS  (PRN):  acetaminophen   Tablet .. 650 milliGRAM(s) Oral every 6 hours PRN Temp greater or equal to 38C (100.4F), Mild Pain (1 - 3)  morphine  - Injectable 2 milliGRAM(s) IV Push every 4 hours PRN Moderate Pain (4 - 6)  ondansetron Injectable 4 milliGRAM(s) IV Push every 6 hours PRN Nausea and/or Vomiting  prednisoLONE acetate 1% Suspension 1 Drop(s) Both EYES two times a day PRN home med  sodium chloride 2% Ophthalmic Solution 1 Drop(s) Both EYES two times a day PRN dry eye  zolpidem 5 milliGRAM(s) Oral at bedtime PRN Insomnia

## 2019-09-02 NOTE — DIETITIAN INITIAL EVALUATION ADULT. - PERTINENT LABORATORY DATA
09-02 Na142 mmol/L Glu 100 mg/dL<H> K+ 4.6 mmol/L Cr  0.54 mg/dL BUN 14 mg/dL Phos 4.0 mg/dL Alb n/a   PAB n/a

## 2019-09-02 NOTE — CHART NOTE - NSCHARTNOTEFT_GEN_A_CORE
Upon Nutritional Assessment by the Registered Dietitian your patient was determined to meet criteria / has evidence of the following diagnosis/diagnoses:          [ ]  Mild Protein Calorie Malnutrition        [ ]  Moderate Protein Calorie Malnutrition        [x] Severe Protein Calorie Malnutrition        [ ] Unspecified Protein Calorie Malnutrition        [ ] Underweight / BMI <19        [ ] Morbid Obesity / BMI > 40      Findings:  severe malnutrition in acute on chronic illness r/t altered GI function AEB severe muscle/mild fat wasting; mild edema; meeting <50% of ENN x 5 days    Findings as based on:  •  Comprehensive nutrition assessment and consultation  •  Calorie counts (nutrient intake analysis)  •  Food acceptance and intake status from observations by staff  •  Follow up  •  Patient education  •  Intervention secondary to interdisciplinary rounds  •   concerns      Treatment:    The following diet has been recommended:  1) if pt is unable to have diet advancement, consider PN to meet >80% of ENN until GI can be used.   2) encourage protein containing liquids   3) monitor PO intake/tolerance   4) add MVI with minerals daily to ensure 100% RDI met    PROVIDER Section:     By signing this assessment you are acknowledging and agree with the diagnosis/diagnoses assigned by the Registered Dietitian    Comments:

## 2019-09-02 NOTE — PROGRESS NOTE ADULT - SUBJECTIVE AND OBJECTIVE BOX
CC:Patient is a 68y old  Female who presents with a chief complaint of Hypotension (02 Sep 2019 07:31)      Subjective:  Pt seen and examined at bedside with chaperone. Pt is AAOx3, pt in no acute distress. Pt denied c/o fever, chills, chest pain, SOB, abd pain, N/V/D, extremity pain or dysfunction, hemoptysis, hematemesis, hematuria, hematochexia, headache, diplopia, vertigo, dizzyness. Pt states (+) void, (+) ambulation, (+) bowel function without melena or blood    ROS:  otherwise as abovementioned ROS    Vital Signs Last 24 Hrs  T(C): 36.8 (02 Sep 2019 08:00), Max: 37.1 (02 Sep 2019 00:00)  T(F): 98.2 (02 Sep 2019 08:00), Max: 98.8 (02 Sep 2019 00:00)  HR: 65 (02 Sep 2019 12:00) (57 - 79)  BP: 118/47 (02 Sep 2019 12:00) (96/43 - 154/57)  BP(mean): 65 (02 Sep 2019 12:00) (47 - 83)  RR: 13 (02 Sep 2019 12:00) (8 - 17)  SpO2: 95% (02 Sep 2019 12:00) (91% - 100%)    Labs:                                9.3    6.27  )-----------( 225      ( 02 Sep 2019 07:51 )             29.9     CBC Full  -  ( 02 Sep 2019 07:51 )  WBC Count : 6.27 K/uL  RBC Count : 3.23 M/uL  Hemoglobin : 9.3 g/dL  Hematocrit : 29.9 %  Platelet Count - Automated : 225 K/uL  Mean Cell Volume : 92.6 fl  Mean Cell Hemoglobin : 28.8 pg  Mean Cell Hemoglobin Concentration : 31.1 gm/dL  Auto Neutrophil # : x  Auto Lymphocyte # : x  Auto Monocyte # : x  Auto Eosinophil # : x  Auto Basophil # : x  Auto Neutrophil % : x  Auto Lymphocyte % : x  Auto Monocyte % : x  Auto Eosinophil % : x  Auto Basophil % : x    09-02    142  |  110<H>  |  14  ----------------------------<  100<H>  4.6   |  25  |  0.54    Ca    8.4<L>      02 Sep 2019 06:58  Phos  4.0     09-02  Mg     2.2     09-02              Meds:  acetaminophen   Tablet .. 650 milliGRAM(s) Oral every 6 hours PRN  atorvastatin 40 milliGRAM(s) Oral at bedtime  baclofen 20 milliGRAM(s) Oral two times a day  DULoxetine 20 milliGRAM(s) Oral daily  fluconAZOLE IVPB 100 milliGRAM(s) IV Intermittent every 24 hours  gabapentin 600 milliGRAM(s) Oral three times a day  labetalol 200 milliGRAM(s) Oral two times a day  morphine  - Injectable 2 milliGRAM(s) IV Push every 4 hours PRN  ondansetron Injectable 4 milliGRAM(s) IV Push every 6 hours PRN  pantoprazole Infusion 8 mG/Hr IV Continuous <Continuous>  polyethylene glycol 3350 17 Gram(s) Oral every 12 hours  prednisoLONE acetate 1% Suspension 1 Drop(s) Both EYES two times a day PRN  sodium chloride 2% Ophthalmic Solution 1 Drop(s) Both EYES two times a day PRN  valACYclovir 1000 milliGRAM(s) Oral daily  zolpidem 5 milliGRAM(s) Oral at bedtime PRN      Radiology:      Physical exam:  Pt is aaox3  Pt in no acute distress  Psych: normal affect  Resp: CTAB  CVS: S1S2(+)  ABD: bowel sounds (+), soft, non distended, no rebound, no guarding, no rigidity, no skin changes to exam. No tenderness to exam  EXT: no calf tenderness or edema to exam b/l, on VTE prophylaxis  Skin: no adverse skin changes to exam

## 2019-09-02 NOTE — DIETITIAN INITIAL EVALUATION ADULT. - ADD RECOMMEND
1) if pt is unable to have diet advancement, consider PN to meet >80% of ENN until GI can be used. 2) encourage protein containing liquids 3) monitor PO intake/tolerance 4) add MVI with minerals daily to ensure 100% RDI met

## 2019-09-02 NOTE — DIETITIAN INITIAL EVALUATION ADULT. - MALNUTRITION
severe malnutrition in acute on chronic illness r/t altered GI function AEB severe muscle/mild fat wasting; mild edema; meeting <50% of ENN x 5 days severe malnutrition in acute on chronic illness

## 2019-09-02 NOTE — PROGRESS NOTE ADULT - CARDIOVASCULAR DETAILS
positive S1/positive S2/tachycardia
positive S1/positive S2
positive S1/positive S2
positive S2/positive S1

## 2019-09-02 NOTE — PROGRESS NOTE ADULT - SUBJECTIVE AND OBJECTIVE BOX
Patient is a 67 year old female Patient also with recent UGI bleed.  She presented to ED with hypotension with lactate of 3.2. Received IV fluid in ED, lactate down to 1.9 this am. No obvious GI bleed. No hematemesis or melena.   Today her hgb fell to 7 and was given 2 U PRBC and in the afternoon developed melena.  Dr. Yoo did and EGD and again found fresh blood and clot in her proximal stomach.  Post EGD she hads developed tachycardia but with a preserved BP.  Patient is going for a STAT CT angio and then to the ICU.      8/31: Patient seen in bed.  S/P 2 U over night.    9/1: H & H has been stable   9/2: H & H has been stable.        PAST MEDICAL & SURGICAL HISTORY:  Dorsalgia of lumbar region: on pain medication /baclofen po and pump  Self-catheterizes urinary bladder  Anemia: chronic  Uveitis  Osteoporosis  PAD (peripheral artery disease)  Hematoma: spinal  September  treated  September 2018  Paraplegia: on wheelchair goes to physical therapy 2 x weekly  Aortic dissection, thoracic: Type A Repaired 2009  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2018  UTI (urinary tract infection): stable x 3 months  TIA (transient ischemic attack)  HTN (Hypertension): on meds  History of corneal transplant: left corneal transplant on 5/21/2018  Disorder of spine: unthetethering 2 x  Presence of IVC filter: 2014 ?  S/P aortic dissection repair: Type A Dissection repair /2009   descending aortic aneurysm repair 9/2016  H/O Spinal surgery: laminectomies 2014      FAMILY HISTORY:  Family history of Alzheimer's disease      Social Hx:    Allergies    No Known Allergies    Intolerances            ICU Vital Signs Last 24 Hrs  T(C): 37.1 (02 Sep 2019 00:00), Max: 37.1 (01 Sep 2019 10:00)  T(F): 98.8 (02 Sep 2019 00:00), Max: 98.8 (02 Sep 2019 00:00)  HR: 61 (02 Sep 2019 05:00) (57 - 79)  BP: 122/67 (02 Sep 2019 05:00) (96/43 - 145/53)  BP(mean): 80 (02 Sep 2019 05:00) (47 - 80)  ABP: --  ABP(mean): --  RR: 9 (02 Sep 2019 05:00) (8 - 15)  SpO2: 100% (02 Sep 2019 05:00) (91% - 100%)          I&O's Summary    01 Sep 2019 07:01  -  02 Sep 2019 07:00  --------------------------------------------------------  IN: 150 mL / OUT: 700 mL / NET: -550 mL                              8.9    7.09  )-----------( 231      ( 01 Sep 2019 14:12 )             27.4       09-01    141  |  111<H>  |  20  ----------------------------<  95  4.2   |  24  |  0.42<L>    Ca    8.6      01 Sep 2019 06:39  Phos  3.7     09-01  Mg     2.1     09-01    MEDICATIONS  (STANDING):  atorvastatin 40 milliGRAM(s) Oral at bedtime  baclofen 20 milliGRAM(s) Oral two times a day  DULoxetine 20 milliGRAM(s) Oral daily  fluconAZOLE IVPB 100 milliGRAM(s) IV Intermittent every 24 hours  gabapentin 600 milliGRAM(s) Oral three times a day  labetalol 200 milliGRAM(s) Oral two times a day  pantoprazole Infusion 8 mG/Hr (10 mL/Hr) IV Continuous <Continuous>  polyethylene glycol 3350 17 Gram(s) Oral every 12 hours  valACYclovir 1000 milliGRAM(s) Oral daily    MEDICATIONS  (PRN):  acetaminophen   Tablet .. 650 milliGRAM(s) Oral every 6 hours PRN Temp greater or equal to 38C (100.4F), Mild Pain (1 - 3)  morphine  - Injectable 2 milliGRAM(s) IV Push every 4 hours PRN Moderate Pain (4 - 6)  ondansetron Injectable 4 milliGRAM(s) IV Push every 6 hours PRN Nausea and/or Vomiting  prednisoLONE acetate 1% Suspension 1 Drop(s) Both EYES two times a day PRN home med  sodium chloride 2% Ophthalmic Solution 1 Drop(s) Both EYES two times a day PRN dry eye  zolpidem 5 milliGRAM(s) Oral at bedtime PRN Insomnia      DVT Prophylaxis: V    Advanced Directives:  Discussed with:    Visit Information:     ** Time is exclusive of billed procedures and/or teaching and/or routine family updates.

## 2019-09-03 NOTE — PROGRESS NOTE ADULT - SUBJECTIVE AND OBJECTIVE BOX
CC:Patient is a 68y old  Female who presents with a chief complaint of Hypotension (03 Sep 2019 09:16)      Subjective:  Pt seen and examined at bedside with chaperone. Pt is AAOx3, pt in no acute distress. Pt denied c/o fever, chills, chest pain, SOB, abd pain, N/V/D, extremity pain or dysfunction, hemoptysis, hematemesis, hematuria, hematochexia, headache, diplopia, vertigo, dizzyness. Pt states (+) void, (+) ambulation, (+) bowel function without melena or blood    ROS:  otherwise as abovementioned ROS    Vital Signs Last 24 Hrs  T(C): 36.2 (03 Sep 2019 08:00), Max: 36.8 (02 Sep 2019 20:00)  T(F): 97.2 (03 Sep 2019 08:00), Max: 98.3 (02 Sep 2019 20:00)  HR: 65 (03 Sep 2019 10:00) (53 - 81)  BP: 101/54 (03 Sep 2019 10:00) (101/54 - 164/61)  BP(mean): 65 (03 Sep 2019 10:00) (61 - 127)  RR: 15 (03 Sep 2019 10:00) (8 - 19)  SpO2: 98% (03 Sep 2019 10:00) (94% - 98%)    Labs:                                9.7    6.11  )-----------( 225      ( 03 Sep 2019 07:05 )             30.9     CBC Full  -  ( 03 Sep 2019 07:05 )  WBC Count : 6.11 K/uL  RBC Count : 3.33 M/uL  Hemoglobin : 9.7 g/dL  Hematocrit : 30.9 %  Platelet Count - Automated : 225 K/uL  Mean Cell Volume : 92.8 fl  Mean Cell Hemoglobin : 29.1 pg  Mean Cell Hemoglobin Concentration : 31.4 gm/dL  Auto Neutrophil # : x  Auto Lymphocyte # : x  Auto Monocyte # : x  Auto Eosinophil # : x  Auto Basophil # : x  Auto Neutrophil % : x  Auto Lymphocyte % : x  Auto Monocyte % : x  Auto Eosinophil % : x  Auto Basophil % : x    09-03    144  |  111<H>  |  10  ----------------------------<  102<H>  3.9   |  24  |  0.54    Ca    8.7      03 Sep 2019 07:05  Phos  3.6     09-03  Mg     2.1     09-03              Meds:  acetaminophen   Tablet .. 650 milliGRAM(s) Oral every 6 hours PRN  atorvastatin 40 milliGRAM(s) Oral at bedtime  baclofen 20 milliGRAM(s) Oral two times a day  DULoxetine 20 milliGRAM(s) Oral daily  gabapentin 600 milliGRAM(s) Oral three times a day  labetalol 200 milliGRAM(s) Oral two times a day  lidocaine   Patch 1 Patch Transdermal daily  morphine  - Injectable 2 milliGRAM(s) IV Push every 4 hours PRN  ondansetron Injectable 4 milliGRAM(s) IV Push every 6 hours PRN  pantoprazole Infusion 8 mG/Hr IV Continuous <Continuous>  polyethylene glycol 3350 17 Gram(s) Oral every 12 hours  prednisoLONE acetate 1% Suspension 1 Drop(s) Left EYE daily  sodium chloride 2% Ophthalmic Solution 1 Drop(s) Both EYES two times a day PRN  valACYclovir 1000 milliGRAM(s) Oral daily  zolpidem 5 milliGRAM(s) Oral at bedtime PRN      Radiology:      Physical exam:  Pt is aaox3  Pt in no acute distress  Psych: normal affect  Resp: CTAB  CVS: S1S2(+)  ABD: bowel sounds (+), soft, non distended, no rebound, no guarding, no rigidity, no skin changes to exam. No tenderness to exam  EXT: no calf tenderness or edema to exam b/l, on VTE prophylaxis  Skin: no adverse skin changes to exam

## 2019-09-03 NOTE — PROGRESS NOTE ADULT - ASSESSMENT
69y/o female re-admitted with hypotension, and anemia after recently being admitted on CCU with severe acute on chronic anemia/sepsis noted to have hydronephrosis, urethral stone s/p cystoscopy and stent placement now with persistent upper gi bleed s/p multiple egds with Dr. Yoo.  Awaiting bed for transfer in [place].   PPI.  H/H stable.   Discussed with pt, Dr. Yoo.

## 2019-09-03 NOTE — PROGRESS NOTE ADULT - ASSESSMENT
A/P:  GI bleed  H/H stable  Serial abd exams  Monitor for GI bleed  Cont current care and meds  GI on consult  F/U labs  No acute surgical intervention at this time  Pt awaiting transfer to tertiary care center, [place]

## 2019-09-03 NOTE — CHART NOTE - NSCHARTNOTEFT_GEN_A_CORE
Spoke with dr Branham [phone], transfer center and accepting hospitalist--pt is accepted, waiting for tele bed to become available. Pt updated

## 2019-09-03 NOTE — PROGRESS NOTE ADULT - ASSESSMENT
IMP:    67 year old female with PMH significant for:                   Repair of Aortic Dissection 2009 than 2014                   Spinal Hematoma leading to Paraplegia 2015                   HTN                   chronic pain - has baclofen pump    Recent admission for obst L renal stone and sepsis s/p cysto admitted with GIB     Plan:    Tx to SDU--[facility] when bed available   PO diet  PPI  Follow CBC  DVT prophy--SCD    Tx to SDU level--d/w ICU staff and pt.  All concerns addressed

## 2019-09-03 NOTE — PROGRESS NOTE ADULT - SUBJECTIVE AND OBJECTIVE BOX
CC:  GIB    HPI:    67 year old female with PMH significant for:                   Repair of Aortic Dissection 2009 than 2014                   Spinal Hematoma leading to Paraplegia 2015                   HTN                   chronic pain - has baclofen pump                             Possible IVC filter  Pt presents with anemia and GIB--tx to ICU--has had multiple EGD by dr booker    9/3:  Pt seen and examined in ICU--awake and alert--no active bleeding.  Awaiting for tx.  Will tx to SDU      PMH:  As above.     PSH:  As above.     FH: Non Contributory other than those listed in HPI    Social History:  NC    MEDICATIONS  (STANDING):  atorvastatin 40 milliGRAM(s) Oral at bedtime  baclofen 20 milliGRAM(s) Oral two times a day  DULoxetine 20 milliGRAM(s) Oral daily  gabapentin 600 milliGRAM(s) Oral three times a day  labetalol 200 milliGRAM(s) Oral two times a day  lidocaine   Patch 1 Patch Transdermal daily  pantoprazole Infusion 8 mG/Hr (10 mL/Hr) IV Continuous <Continuous>  polyethylene glycol 3350 17 Gram(s) Oral every 12 hours  prednisoLONE acetate 1% Suspension 1 Drop(s) Left EYE daily  valACYclovir 1000 milliGRAM(s) Oral daily    MEDICATIONS  (PRN):  acetaminophen   Tablet .. 650 milliGRAM(s) Oral every 6 hours PRN Temp greater or equal to 38C (100.4F), Mild Pain (1 - 3)  morphine  - Injectable 2 milliGRAM(s) IV Push every 4 hours PRN Moderate Pain (4 - 6)  ondansetron Injectable 4 milliGRAM(s) IV Push every 6 hours PRN Nausea and/or Vomiting  sodium chloride 2% Ophthalmic Solution 1 Drop(s) Both EYES two times a day PRN dry eye  zolpidem 5 milliGRAM(s) Oral at bedtime PRN Insomnia      Allergies: NKDA    ROS:  SEE BELOW        ICU Vital Signs Last 24 Hrs  T(C): 36.2 (03 Sep 2019 08:00), Max: 36.8 (02 Sep 2019 20:00)  T(F): 97.2 (03 Sep 2019 08:00), Max: 98.3 (02 Sep 2019 20:00)  HR: 64 (03 Sep 2019 09:00) (53 - 81)  BP: 105/46 (03 Sep 2019 09:00) (105/46 - 164/61)  BP(mean): 61 (03 Sep 2019 09:00) (61 - 127)  ABP: --  ABP(mean): --  RR: 9 (03 Sep 2019 09:00) (8 - 19)  SpO2: 97% (03 Sep 2019 09:00) (94% - 98%)          I&O's Summary    02 Sep 2019 07:01  -  03 Sep 2019 07:00  --------------------------------------------------------  IN: 283 mL / OUT: 1420 mL / NET: -1137 mL    03 Sep 2019 07:01  -  03 Sep 2019 09:16  --------------------------------------------------------  IN: 0 mL / OUT: 550 mL / NET: -550 mL        Physical Exam:  SEE BELOW                          9.7    6.11  )-----------( 225      ( 03 Sep 2019 07:05 )             30.9       09-03    144  |  111<H>  |  10  ----------------------------<  102<H>  3.9   |  24  |  0.54    Ca    8.7      03 Sep 2019 07:05  Phos  3.6     09-03  Mg     2.1     09-03                      DVT Prophylaxis:                                                            Contraindication:     Advanced Directives:    Discussed with:    Visit Information:  Time spent excluding procedure:      ** Time is exclusive of billed procedures and/or teaching and/or routine family updates.

## 2019-09-03 NOTE — PROGRESS NOTE ADULT - SUBJECTIVE AND OBJECTIVE BOX
Patient is a 68y old  Female who presents with a chief complaint of Hypotension (02 Sep 2019 12:27)    HPI:  This patient is a 67 year old female with PMH significantfor:                   Repair of Aortic Dissection 2009 than 2014                   Spinal Hematoma leading to Paraplegia 2015                   HTN                   chronic pain - has baclofen pump                             Possible IVC filter  Patient also with recent UGI bleed with HGB of 4.4, S/P multiple blood transfusions and discharged home 2 days ago.  Presented to ED with hypotension with lactate of 3.2. Received IV fluid in ED, lactate down to 1.9 this am. No obvious GI bleed. No hematemesis or melena. No nausea, vomiting, abd pain, fever or chills.   Her BP was low at home asper patient. She was seen by ICU, no need for ICU admission. Her blood pressure improved.   She also received IV rocephin for UTI. (29 Aug 2019 08:57)    9/3/2019-  Pt feeling ok this morning.   Voices her frustration as she is awaiting a bed in [place]   Tolerating full liquids, no n/v.    PAST MEDICAL & SURGICAL HISTORY:  Dorsalgia of lumbar region: on pain medication /baclofen po and pump  Self-catheterizes urinary bladder  Anemia: chronic  Uveitis  Osteoporosis  PAD (peripheral artery disease)  Hematoma: spinal  September  treated  September 2018  Paraplegia: on wheelchair goes to physical therapy 2 x weekly  Aortic dissection, thoracic: Type A Repaired 2009  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2018  UTI (urinary tract infection): stable x 3 months  TIA (transient ischemic attack)  HTN (Hypertension): on meds  History of corneal transplant: left corneal transplant on 5/21/2018  Disorder of spine: unthetethering 2 x  Presence of IVC filter: 2014 ?  S/P aortic dissection repair: Type A Dissection repair /2009   descending aortic aneurysm repair 9/2016  H/O Spinal surgery: laminectomies 2014    MEDICATIONS  (STANDING):  atorvastatin 40 milliGRAM(s) Oral at bedtime  baclofen 20 milliGRAM(s) Oral two times a day  DULoxetine 20 milliGRAM(s) Oral daily  gabapentin 600 milliGRAM(s) Oral three times a day  labetalol 200 milliGRAM(s) Oral two times a day  lidocaine   Patch 1 Patch Transdermal daily  pantoprazole Infusion 8 mG/Hr (10 mL/Hr) IV Continuous <Continuous>  polyethylene glycol 3350 17 Gram(s) Oral every 12 hours  prednisoLONE acetate 1% Suspension 1 Drop(s) Left EYE daily  valACYclovir 1000 milliGRAM(s) Oral daily    MEDICATIONS  (PRN):  acetaminophen   Tablet .. 650 milliGRAM(s) Oral every 6 hours PRN Temp greater or equal to 38C (100.4F), Mild Pain (1 - 3)  morphine  - Injectable 2 milliGRAM(s) IV Push every 4 hours PRN Moderate Pain (4 - 6)  ondansetron Injectable 4 milliGRAM(s) IV Push every 6 hours PRN Nausea and/or Vomiting  sodium chloride 2% Ophthalmic Solution 1 Drop(s) Both EYES two times a day PRN dry eye  zolpidem 5 milliGRAM(s) Oral at bedtime PRN Insomnia    Allergies  No Known Allergies    FAMILY HISTORY:  Family history of Alzheimer's disease    REVIEW OF SYSTEMS:  CONSTITUTIONAL: No weakness, fevers or chills  RESPIRATORY: No cough, wheezing, hemoptysis; No shortness of breath  CARDIOVASCULAR: No chest pain or palpitations  GASTROINTESTINAL: Per HPI.     T(C): 36.2 (03 Sep 2019 08:00), Max: 36.8 (02 Sep 2019 20:00)  T(F): 97.2 (03 Sep 2019 08:00), Max: 98.3 (02 Sep 2019 20:00)  HR: 59 (03 Sep 2019 08:00) (53 - 81)  BP: 137/63 (03 Sep 2019 08:00) (105/46 - 164/61)  BP(mean): 82 (03 Sep 2019 08:00) (61 - 127)  RR: 11 (03 Sep 2019 08:00) (8 - 19)  SpO2: 97% (03 Sep 2019 08:00) (94% - 98%)    PHYSICAL EXAM:  Constitutional: Middle aged female in NAD, paraplegic   Respiratory: CTAB  Cardiovascular: S1 and S2, RRR, no M/G/R  Gastrointestinal: BS+, soft, NT/ND, baclofen pump noted.       LABS:                        9.7    6.11  )-----------( 225      ( 03 Sep 2019 07:05 )             30.9     09-03    144  |  111<H>  |  10  ----------------------------<  102<H>  3.9   |  24  |  0.54    Ca    8.7      03 Sep 2019 07:05  Phos  3.6     09-03  Mg     2.1     09-03            RADIOLOGY & ADDITIONAL STUDIES:

## 2019-09-04 NOTE — PROGRESS NOTE ADULT - SUBJECTIVE AND OBJECTIVE BOX
CC:  GIB    HPI:    67 year old female with PMH significant for:                   Repair of Aortic Dissection 2009 than 2014                   Spinal Hematoma leading to Paraplegia 2015                   HTN                   chronic pain - has baclofen pump                             Possible IVC filter  Pt presents with anemia and GIB--tx to ICU--has had multiple EGD by dr booker    9/3:  Pt seen and examined in ICU--awake and alert--no active bleeding.  Awaiting for tx.  Will tx to SDU    9/4:  No events overnight--just spoke with transfer center--still no beds.  As above       PMH:  As above.     PSH:  As above.     FH: Non Contributory other than those listed in HPI    Social History:  NC    MEDICATIONS  (STANDING):  atorvastatin 40 milliGRAM(s) Oral at bedtime  baclofen 20 milliGRAM(s) Oral three times a day  DULoxetine 20 milliGRAM(s) Oral daily  gabapentin 600 milliGRAM(s) Oral three times a day  labetalol 200 milliGRAM(s) Oral two times a day  lidocaine   Patch 1 Patch Transdermal daily  pantoprazole Infusion 8 mG/Hr (10 mL/Hr) IV Continuous <Continuous>  polyethylene glycol 3350 17 Gram(s) Oral every 12 hours  prednisoLONE acetate 1% Suspension 1 Drop(s) Left EYE daily  valACYclovir 1000 milliGRAM(s) Oral daily    MEDICATIONS  (PRN):  acetaminophen   Tablet .. 650 milliGRAM(s) Oral every 6 hours PRN Temp greater or equal to 38C (100.4F), Mild Pain (1 - 3)  morphine  - Injectable 2 milliGRAM(s) IV Push every 4 hours PRN Moderate Pain (4 - 6)  ondansetron Injectable 4 milliGRAM(s) IV Push every 6 hours PRN Nausea and/or Vomiting  sodium chloride 2% Ophthalmic Solution 1 Drop(s) Both EYES two times a day PRN dry eye  zolpidem 5 milliGRAM(s) Oral at bedtime PRN Insomnia      Allergies: NKDA    ROS:  SEE BELOW        ICU Vital Signs Last 24 Hrs  T(C): 36.8 (04 Sep 2019 06:00), Max: 37.1 (03 Sep 2019 14:00)  T(F): 98.2 (04 Sep 2019 06:00), Max: 98.8 (03 Sep 2019 14:00)  HR: 69 (04 Sep 2019 08:00) (54 - 70)  BP: 116/64 (04 Sep 2019 08:00) (100/67 - 164/64)  BP(mean): 78 (04 Sep 2019 08:00) (51 - 97)  ABP: --  ABP(mean): --  RR: 16 (04 Sep 2019 08:00) (8 - 19)  SpO2: 96% (04 Sep 2019 07:00) (95% - 100%)          I&O's Summary    03 Sep 2019 07:01  -  04 Sep 2019 07:00  --------------------------------------------------------  IN: 218.5 mL / OUT: 2450 mL / NET: -2231.5 mL        Physical Exam:  SEE BELOW                          10.4   9.68  )-----------( 258      ( 04 Sep 2019 06:40 )             33.3       09-04    146<H>  |  109<H>  |  8   ----------------------------<  95  4.6   |  30  |  0.68    Ca    9.1      04 Sep 2019 06:40  Phos  3.6     09-04  Mg     2.2     09-04                      DVT Prophylaxis:                                                            Contraindication:     Advanced Directives:    Discussed with:    Visit Information:  Time spent excluding procedure:      ** Time is exclusive of billed procedures and/or teaching and/or routine family updates.

## 2019-09-04 NOTE — CHART NOTE - NSCHARTNOTEFT_GEN_A_CORE
Dr loya consulted for baclofen pump management  Spoke with --updated regarding above and transfer status

## 2019-09-04 NOTE — PROGRESS NOTE ADULT - PROVIDER SPECIALTY LIST ADULT
Critical Care
Gastroenterology
Gastroenterology
Hospitalist
Intervent Radiology
Surgery
Surgery
Trauma Surgery
Urology
Gastroenterology
Trauma Surgery

## 2019-09-04 NOTE — H&P ADULT - HISTORY OF PRESENT ILLNESS
68F with PMHx of aortic dissection (post-repair several years prior), spinal cord hematoma (now functionally paraplegic, she can move her left leg), chronic spasticity and pain (on PRN Oxycodone and has Baclofen pump), HTN, nephrolithiasis s/p left urethral stent and CMV endotheliitis (post-corneal transplant) transferred from [facility] after suffering UGIB. Patient has complicated medical history and recent medical course not full reflected on chart review, but collateral obtained from . Patient was at home in early August 2019 when  though she was not mentating well. He took her vitals at that time and found her to have a systolic BP in the 70s and HR in the 150s. When he brought her to [facility] they found her Hg to be 4.4. Unknown if she was having melena or hematochezia at that time. While she was there patient underwent four EGDs and received more than 10 units PRBC. EGDs were unable to identify active source of bleeding and usually showed pooled blood. There is suspicion for dieulafoy, but unable to be acted upon at [place] with the multiple EGDs at times only showing adherent clot. She was evaluated by surgery who recommended ex-lap given the severity, but patient's family deferred for now. Patient spent a majority of her time in [place] at the ICU. CT angiogram could not identify active source of bleeding with IR evaluation stating they could not provide a solution given the patient's previous dissection and collateral arterial formation. Patient's PMD is Dr. Branham who recommended transfer to [facility] for evaluation by Dr. Ambrocio. When seen by me patient had no active complaints. She states her Baclofen pump is empty. She denies any hematochezia or melena. She is slightly fretful given the recent medical events and is hopeful that a solution can be found. 68F with PMHx of aortic dissection (post-repair several years prior), spinal cord hematoma (now functionally paraplegic, she can move her left leg), chronic spasticity and pain (on PRN Oxycodone and has Baclofen pump), HTN, nephrolithiasis s/p left urethral stent and endotheliitis/keratitis (post-corneal transplant) transferred from [facility] after suffering UGIB. Patient has complicated medical history and recent medical course not full reflected on chart review, but collateral obtained from . Patient was at home in early August 2019 when  though she was not mentating well. He took her vitals at that time and found her to have a systolic BP in the 70s and HR in the 150s. When he brought her to [facility] they found her Hg to be 4.4. Unknown if she was having melena or hematochezia at that time. While she was there patient underwent four EGDs and received more than 10 units PRBC. EGDs were unable to identify active source of bleeding and usually showed pooled blood. There is suspicion for dieulafoy, but unable to be acted upon at [place] with the multiple EGDs at times only showing adherent clot. She was evaluated by surgery who recommended ex-lap given the severity, but patient's family deferred for now. Patient spent a majority of her time in [place] at the ICU. CT angiogram could not identify active source of bleeding with IR evaluation stating they could not provide a solution given the patient's previous dissection and collateral arterial formation. Patient's PMD is Dr. Barnham who recommended transfer to [facility] for evaluation by Dr. Ambrocio. When seen by me patient had no active complaints. She states her Baclofen pump is empty. She denies any hematochezia or melena. She is slightly fretful given the recent medical events and is hopeful that a solution can be found.

## 2019-09-04 NOTE — H&P ADULT - PROBLEM SELECTOR PLAN 6
-Continue with multi-modal pain management: Tylenol mild-mod pain, Morphine 2 q4h PRN severe pain, Baclofen 20 mg TID, Duloxetine 20 mg daily, and Gabapentin 600 TID  -Patient has baclofen pump which has run out and needs to be refilled, should follow up as outpatient

## 2019-09-04 NOTE — H&P ADULT - NSHPPHYSICALEXAM_GEN_ALL_CORE
T(C): 36.2 (09-04-19 @ 19:28), Max: 36.8 (09-04-19 @ 06:00)  HR: 63 (09-04-19 @ 19:28) (54 - 91)  BP: 108/68 (09-04-19 @ 19:28) (97/61 - 164/64)  RR: 18 (09-04-19 @ 19:28) (8 - 29)  SpO2: 93% (09-04-19 @ 19:28) (92% - 98%)    Gen: female in NAD, appears comfortable, no diaphoresis  HEENT: NCAT, MMM, neck soft and supple  CV: +S1/S2, no m/r/g  Resp: CTAB, no w/r/r  GI: normoactive BS, soft, NTND, no rebounding/guarding, baclofen pump noted on RLQ  Ext: No LE edema, extremities appear warm and well perfused   Neuro: AOx3, she is unable to move her right leg  Psych: No SI/HI/AVH, appropriate affect  Skin: no petechiae, ecchymosis or maculopapular rash noted; may have sacral decubitus ulcer

## 2019-09-04 NOTE — H&P ADULT - PROBLEM SELECTOR PLAN 1
-Continue with daily CBCs & maintain active T&S, if suspicion for bleeding will perform more frequently  -NPO at midnight for EGD and Dr. Ambrocio evaluation  -PPI IV BID  -No chemical DVT PPx, continue with ICDs bilaterally

## 2019-09-04 NOTE — DISCHARGE NOTE NURSING/CASE MANAGEMENT/SOCIAL WORK - PATIENT PORTAL LINK FT
You can access the FollowMyHealth Patient Portal offered by [facility] by registering at the following website: [email or web address]. By joining [facility]’s FollowMyHealth portal, you will also be able to view your health information using other applications (apps) compatible with our system.

## 2019-09-04 NOTE — PROGRESS NOTE ADULT - RS GEN PE MLT RESP DETAILS PC
airway patent/breath sounds equal
breath sounds equal/airway patent
no rhonchi/no wheezes/no rales/breath sounds equal
no rhonchi/breath sounds equal/no rales/no wheezes
breath sounds equal/no rhonchi/no rales/no wheezes
no wheezes/breath sounds equal/no rales/no rhonchi

## 2019-09-04 NOTE — H&P ADULT - ATTENDING COMMENTS
Complicated case with likely home services needing to be established and/or re-established. Will speak to social work. Keep  informed, he seems very involved in patient's day-to-day care.

## 2019-09-04 NOTE — CHART NOTE - NSCHARTNOTEFT_GEN_A_CORE
Called by Dr. Fritz Branham to see patient for GI bleed.      Apparently significant UGI bleed recently with at least 2 endoscopies without a discrete lesion in the stomach.  ? Dieulafoy's lesion.  Unclear.    Please Keep NPO and will officially consult on Thursday and likely perform EGD.  IV PPI    Thank you.    Donell Ambrocio MD    [phone]

## 2019-09-04 NOTE — H&P ADULT - PROBLEM SELECTOR PLAN 5
-Possibly secondary to HSV or CMV, but  can only tell me "a virus" and now post-corneal transplanted  -Continue with Valtrex for viral suppression  -Continue with Prednisolone eye drop into left eye  -Daily artificial tears

## 2019-09-04 NOTE — H&P ADULT - NSHPLABSRESULTS_GEN_ALL_CORE
Labs below    CTA A&P reviewed: no active bleeding, has stable abdominal aortic aneurysm with dissection into iliac artery, left urethral stent

## 2019-09-04 NOTE — H&P ADULT - ASSESSMENT
68F with PMHx of aortic dissection (post-repair several years prior), spinal cord hematoma (now functionally paraplegic, she can move her left leg), chronic spasticity and pain (on PRN Oxycodone and has Baclofen pump), HTN, nephrolithiasis s/p left urethral stent and endotheliitis/keratitis (post-corneal transplant) transferred from [facility] after suffering UGIB. While there patient suffered multiple re-bleed episodes and in total underwent four EGDs and received over 10 units pRBC. It appears that EGDs there only showed pooled blood and/or adherent clot with no active bleeding on endoscopy. Patient had unremarkable CT Angiogram. Patient transferred here for evaluation by Dr. Ambrocio. Hemoglobin has been stable for the past several days.

## 2019-09-04 NOTE — H&P ADULT - PROBLEM SELECTOR PLAN 2
-Function and secondary to spinal hematoma  -Turn q2 hours to prevent pressure ulcer  -Follow up with nursing, patient may be developing stage 1-2 sacral decubitus ulcer  -Nursing for scheduled straight caths (patient has chronic urinary retention and is cathed by her  at home)  -Miralax for chronic stool retention (does periodic manual disimpactions at home)

## 2019-09-04 NOTE — PROGRESS NOTE ADULT - MS EXT PE MLT D E PC
no cyanosis
no pedal edema/no cyanosis
no clubbing/no cyanosis/no pedal edema
no pedal edema/no clubbing/no cyanosis
no cyanosis/no clubbing/no pedal edema
no cyanosis/no pedal edema/no clubbing

## 2019-09-04 NOTE — PROGRESS NOTE ADULT - SUBJECTIVE AND OBJECTIVE BOX
pt hemodynamics and respiratory function reasonable.  No active bleeding noted.    Pt OK to be transferred to [facility]-Surg floor for further care. pt hemodynamics and respiratory function reasonable.  No active bleeding noted.    Pt OK to be transferred to Med-Surg floor for further care.    Also note that pt with small sacral pressure ulcer present on admission     - needs bed that will prevent further wound progression and promote healing ie. Clinitron or air mattress specialty bed. pt hemodynamics and respiratory function reasonable.  No active bleeding noted.    Pt OK to be transferred to Med-Surg floor for further care.    Also note that pt with small sacral pressure ulcer present on admission     - needs bed that will prevent further wound progression and promote healing ie. Clinitron or air mattress specialty bed.    I spoke with pts  Alexis Portillo to update him on situation/plan for transfer in next 1-2 hours.  All questions answered.

## 2019-09-04 NOTE — H&P ADULT - PROBLEM SELECTOR PLAN 4
-Patient had recent left sided nephrolithiasis with urethral stent placement  -Outpatient urology follow up for removal if indicated

## 2019-09-04 NOTE — PROGRESS NOTE ADULT - ASSESSMENT
IMP:    67 year old female with PMH significant for:                   Repair of Aortic Dissection 2009 than 2014                   Spinal Hematoma leading to Paraplegia 2015                   HTN                   chronic pain - has baclofen pump    Recent admission for obst L renal stone and sepsis s/p cysto admitted with GIB     Plan:    Tx to SDU--[facility] when bed available--as d/w transfer center and all other health care provider  PO diet  Change to PPI q12  Follow CBC  DVT prophy--SCD    Tx to SDU level--d/w ICU staff and pt.   updated multiple times throughout the day

## 2019-09-04 NOTE — PROGRESS NOTE ADULT - GASTROINTESTINAL DETAILS
soft/nontender
soft/nontender
soft/nontender/no distention
soft/bowel sounds normal/nontender
nontender/bowel sounds normal/soft
nontender/soft/no distention

## 2019-09-05 NOTE — PROGRESS NOTE ADULT - PROBLEM SELECTOR PLAN 5
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

## 2019-09-05 NOTE — PROGRESS NOTE ADULT - PROBLEM SELECTOR PLAN 3
-Hold Labetalol given at [place] given normotensive BP here -Hold Labetalol for now given normotensive BP here

## 2019-09-05 NOTE — PROGRESS NOTE ADULT - PROBLEM SELECTOR PLAN 4
-Patient had recent left sided nephrolithiasis with urethral stent placement  -Outpatient urology follow up for removal if indicated -Patient had recent left sided nephrolithiasis with urethral stent placement  -Outpatient urology follow up

## 2019-09-05 NOTE — PROGRESS NOTE ADULT - SUBJECTIVE AND OBJECTIVE BOX
Pre-Endoscopy Evaluation      Referring Physician:  dr. carlota piper                                   Procedure:  upper gastrointestinal endoscopy     Indication for Procedure: gib    Pertinent History: gib    Sedation by Anesthesia [x]    PAST MEDICAL & SURGICAL HISTORY:  Dorsalgia of lumbar region: on pain medication /baclofen po and pump  Self-catheterizes urinary bladder  Anemia: chronic  Uveitis  Osteoporosis  PAD (peripheral artery disease)  Hematoma: spinal  September  treated  2018  Paraplegia: on wheelchair goes to physical therapy 2 x weekly  Aortic dissection, thoracic: Type A Repaired   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2018  UTI (urinary tract infection): stable x 3 months  TIA (transient ischemic attack)  HTN (Hypertension): on meds  History of corneal transplant: left corneal transplant on 2018  Disorder of spine: unthetethering 2 x  Presence of IVC filter:  ?  S/P aortic dissection repair: Type A Dissection repair /   descending aortic aneurysm repair 2016  H/O Spinal surgery: laminectomies       PMH of Gastroparesis [ ]  Gastric Surgery [ ]  Gastric Outlet Obstruction [ ]    Allergies:    No Known Allergies    Intolerances:    Latex allergy: [ ] yes [x] no    Medications:MEDICATIONS  (STANDING):  artificial  tears Solution 1 Drop(s) Both EYES daily  atorvastatin 40 milliGRAM(s) Oral at bedtime  baclofen 20 milliGRAM(s) Oral three times a day  dextrose 5% + sodium chloride 0.45%. 1000 milliLiter(s) (60 mL/Hr) IV Continuous <Continuous>  DULoxetine 20 milliGRAM(s) Oral daily  gabapentin 600 milliGRAM(s) Oral three times a day  influenza   Vaccine 0.5 milliLiter(s) IntraMuscular once  pantoprazole  Injectable 40 milliGRAM(s) IV Push two times a day  polyethylene glycol 3350 17 Gram(s) Oral every 12 hours  prednisoLONE acetate 1% Suspension 1 Drop(s) Left EYE daily  valACYclovir 1000 milliGRAM(s) Oral daily    MEDICATIONS  (PRN):  acetaminophen   Tablet .. 650 milliGRAM(s) Oral every 6 hours PRN Mild Pain (1 - 3), Moderate Pain (4 - 6)  morphine  - Injectable 2 milliGRAM(s) IV Push every 4 hours PRN Severe Pain (7 - 10)  ondansetron Injectable 4 milliGRAM(s) IV Push every 6 hours PRN Nausea and/or Vomiting      Smoking: [ ] yes  [x] no    AICD/PPM: [ ] yes   [x] no    Pertinent lab data:                        9.3    6.15  )-----------( 221      ( 05 Sep 2019 08:37 )             30.9         141  |  106  |  8   ----------------------------<  93  4.0   |  25  |  0.67    Ca    9.2      05 Sep 2019 06:43  Phos  3.6     09-  Mg     2.2     09-04    TPro  5.5<L>  /  Alb  3.0<L>  /  TBili  0.4  /  DBili  x   /  AST  10  /  ALT  7<L>  /  AlkPhos  87  09-05    PT/INR - ( 05 Sep 2019 09:24 )   PT: 11.4 sec;   INR: 1.01 ratio         PTT - ( 05 Sep 2019 09:24 )  PTT:34.1 sec        Physical Examination:  Daily Height in cm: 170.18 (04 Sep 2019 19:28)    Daily Weight in k.8 (05 Sep 2019 13:01)  Vital Signs Last 24 Hrs  T(C): 36.7 (05 Sep 2019 12:14), Max: 36.7 (04 Sep 2019 14:00)  T(F): 98.1 (05 Sep 2019 12:14), Max: 98.1 (05 Sep 2019 12:14)  HR: 61 (05 Sep 2019 12:14) (61 - 91)  BP: 125/73 (05 Sep 2019 12:14) (108/68 - 148/71)  BP(mean): 88 (04 Sep 2019 17:00) (74 - 110)  RR: 18 (05 Sep 2019 12:14) (10 - 18)  SpO2: 97% (05 Sep 2019 12:14) (93% - 97%)    Drug Dosing Weight  Height (cm): 170.2 (04 Sep 2019 19:28)  Weight (kg): 52 (29 Aug 2019 11:21)  BMI (kg/m2): 18 (04 Sep 2019 19:28)  BSA (m2): 1.6 (04 Sep 2019 19:28)    Constitutional: NAD    Neck:  No JVD    Respiratory: CTAB/L    Cardiovascular: S1 and S2    Gastrointestinal: BS+, soft, NT/ND    Extremities: No peripheral edema    Neurological: A/O x 3    : No Russo    Skin: No rashes    Comments:      The patient is a suitable candidate for the planned procedure unless box checked [ ]  No, explain:

## 2019-09-05 NOTE — PROGRESS NOTE ADULT - PROBLEM SELECTOR PLAN 6
-Continue with multi-modal pain management: Tylenol mild-mod pain, Morphine 2 q4h PRN severe pain, Baclofen 20 mg TID, Duloxetine 20 mg daily, and Gabapentin 600 TID  -Patient has baclofen pump which has run out and needs to be refilled, should follow up as outpatient -Continue with multi-modal pain management: Tylenol mild-mod pain, Morphine 2 q4h PRN severe pain, Baclofen 20 mg TID, Duloxetine 20 mg daily, and Gabapentin 600 TID  -Patient has baclofen pump which has run out and needs to be refilled. Patient has seen Dr. Nair prior. will contact him to see if he is able to refill it while inpatient as per spouse, baclofen PO is minimally helping.

## 2019-09-05 NOTE — PHARMACOTHERAPY INTERVENTION NOTE - COMMENTS
Medication reconciliation completed. Please refer to outpatient medication review for the updated list. Recommendation made to switch duloxetine to 20mg by mouth twice daily, switch miralax Q12H PRN, increase duloxetine from 20mg daily to twice daily, add sodium chloride 2% opthalmic QID as needed for pain, and add zolpidem 5mg by mouth at bedtime as needed.    Nathan Mueller, PharmD  [phone]

## 2019-09-05 NOTE — DISCHARGE NOTE NURSING/CASE MANAGEMENT/SOCIAL WORK - NSDCPEWEB_GEN_ALL_CORE
[facility] for Tobacco Control website --- [email or web address] website --- [email or web address]

## 2019-09-05 NOTE — DIETITIAN INITIAL EVALUATION ADULT. - ADD RECOMMEND
1. Will continue to monitor PO intake, weight, labs, skin, GI status, diet. 2. Once applicable, encourage PO intake and obtain food preferences. 3. Recommend Multivitamin and Vitamin C to optimize nutrient intake and help with pressure ulcer healing. 4. Provided recommendations to optimize kcal and protein intake once diet is advance to help with skin healing - pt and  made aware RD remains available.

## 2019-09-05 NOTE — PROGRESS NOTE ADULT - SUBJECTIVE AND OBJECTIVE BOX
Patient is a 68y old  Female who presents with a chief complaint of Transfer from [facility] for UGIB (04 Sep 2019 20:17)      SUBJECTIVE / OVERNIGHT EVENTS: patient endorses back and lower abdominal pain. no gross GI bleed.     MEDICATIONS  (STANDING):  artificial  tears Solution 1 Drop(s) Both EYES daily  atorvastatin 40 milliGRAM(s) Oral at bedtime  baclofen 20 milliGRAM(s) Oral three times a day  dextrose 5% + sodium chloride 0.45%. 1000 milliLiter(s) (60 mL/Hr) IV Continuous <Continuous>  DULoxetine 20 milliGRAM(s) Oral daily  gabapentin 600 milliGRAM(s) Oral three times a day  influenza   Vaccine 0.5 milliLiter(s) IntraMuscular once  pantoprazole  Injectable 40 milliGRAM(s) IV Push two times a day  polyethylene glycol 3350 17 Gram(s) Oral every 12 hours  prednisoLONE acetate 1% Suspension 1 Drop(s) Left EYE daily  valACYclovir 1000 milliGRAM(s) Oral daily    MEDICATIONS  (PRN):  acetaminophen   Tablet .. 650 milliGRAM(s) Oral every 6 hours PRN Mild Pain (1 - 3), Moderate Pain (4 - 6)  morphine  - Injectable 2 milliGRAM(s) IV Push every 4 hours PRN Severe Pain (7 - 10)  ondansetron Injectable 4 milliGRAM(s) IV Push every 6 hours PRN Nausea and/or Vomiting      Vital Signs Last 24 Hrs  T(C): 36.7 (05 Sep 2019 12:14), Max: 36.7 (04 Sep 2019 14:00)  T(F): 98.1 (05 Sep 2019 12:14), Max: 98.1 (05 Sep 2019 12:14)  HR: 61 (05 Sep 2019 12:14) (61 - 91)  BP: 125/73 (05 Sep 2019 12:14) (108/68 - 148/71)  BP(mean): 88 (04 Sep 2019 17:00) (74 - 110)  RR: 18 (05 Sep 2019 12:14) (10 - 18)  SpO2: 97% (05 Sep 2019 12:14) (93% - 97%)  CAPILLARY BLOOD GLUCOSE        I&O's Summary    04 Sep 2019 07:01  -  05 Sep 2019 07:00  --------------------------------------------------------  IN: 240 mL / OUT: 1975 mL / NET: -1735 mL        PHYSICAL EXAM:  GENERAL: NAD  HEENT: neck supple  LUNG: Clear to auscultation bilaterally; No wheeze  HEART: S1, S2  ABDOMEN: Soft, Nontender, Nondistended, Bowel sounds present + hardware palpated in RLQ  EXTREMITIES: No leg edema  PSYCH: normal affect, calm  NEUROLOGY: Alert and oriented, communicative, moves arms, lower extremity contracted  SKIN: warm and dry      LABS:                        9.3    6.15  )-----------( 221      ( 05 Sep 2019 08:37 )             30.9     09-05    141  |  106  |  8   ----------------------------<  93  4.0   |  25  |  0.67    Ca    9.2      05 Sep 2019 06:43  Phos  3.6     09-04  Mg     2.2     09-04    TPro  5.5<L>  /  Alb  3.0<L>  /  TBili  0.4  /  DBili  x   /  AST  10  /  ALT  7<L>  /  AlkPhos  87  09-05    PT/INR - ( 05 Sep 2019 09:24 )   PT: 11.4 sec;   INR: 1.01 ratio         PTT - ( 05 Sep 2019 09:24 )  PTT:34.1 sec          RADIOLOGY & ADDITIONAL TESTS:    Imaging Personally Reviewed:    < from: CT Angio Abdomen and Pelvis w/ IV Cont (08.30.19 @ 17:52) >  FINDINGS:    LOWER CHEST: There is pleural calcification versus suture material in the   imaged left lung base. There is mild posterior dependent atelectasis.   There is mild bronchiectasis. There is atelectasis in the lingula.    LIVER: Within normal limits.  BILE DUCTS: Normal caliber.  GALLBLADDER: Layering hyperdensity within the gallbladder likely   represents vicarious excretion of contrast.  SPLEEN: Within normal limits.  PANCREAS: Within normal limits.  ADRENALS: Within normal limits.  KIDNEYS/URETERS: Atrophic left kidney with mildly decreased enhancement.   There is a left ureteral stent again noted, appropriately positioned. A   large 1.2 x 0.8 cm calculus in the mid left ureter is unchanged. Punctate   nonobstructing calculi again noted in the left kidney.    BLADDER: Distended with distal tip of left ureteral stent.  REPRODUCTIVE ORGANS: Uterus and adnexa within normal limits.    BOWEL: There is no evidence for bleed/extravasation into the bowel. No   hypervascular bowel lesions are identified. No bowel obstruction.   Appendix is not visualized. No definite evidence of inflammation in the   pericecal region.  PERITONEUM: No ascites.  VESSELS: Again noted is a aneurysmally dilated and tortuous descending   thoracic aorta. Stable abdominal aortic dissection is again noted   extending into the right iliac artery. Tortuous collateral vessels are   again noted in the upper abdomen in the periportal and perisplenic   regions. An IVC filter is again noted.  RETROPERITONEUM/LYMPH NODES: No lymphadenopathy.    ABDOMINAL WALL: Mild diffuse soft tissue anasarca. Generator pack for a   stimulator is present in the left gluteal area. Another stimulator   generator pack in the right lower anterior abdominal wall.  BONES: Median sternotomy wires. Degenerative change of the lumbar spine.    IMPRESSION:     No CT evidence for active gastrointestinal bleed.    Stable abdominal aortic dissection.    < end of copied text >      Consultant(s) Notes Reviewed:  GI, surgery, ID    Care Discussed with Consultants/Other Providers: medicine NP Patient is a 68y old  Female who presents with a chief complaint of Transfer from [facility] for UGIB (04 Sep 2019 20:17)      SUBJECTIVE / OVERNIGHT EVENTS: patient endorses back and lower abdominal pain. no gross GI bleed.     MEDICATIONS  (STANDING):  artificial  tears Solution 1 Drop(s) Both EYES daily  atorvastatin 40 milliGRAM(s) Oral at bedtime  baclofen 20 milliGRAM(s) Oral three times a day  dextrose 5% + sodium chloride 0.45%. 1000 milliLiter(s) (60 mL/Hr) IV Continuous <Continuous>  DULoxetine 20 milliGRAM(s) Oral daily  gabapentin 600 milliGRAM(s) Oral three times a day  influenza   Vaccine 0.5 milliLiter(s) IntraMuscular once  pantoprazole  Injectable 40 milliGRAM(s) IV Push two times a day  polyethylene glycol 3350 17 Gram(s) Oral every 12 hours  prednisoLONE acetate 1% Suspension 1 Drop(s) Left EYE daily  valACYclovir 1000 milliGRAM(s) Oral daily    MEDICATIONS  (PRN):  acetaminophen   Tablet .. 650 milliGRAM(s) Oral every 6 hours PRN Mild Pain (1 - 3), Moderate Pain (4 - 6)  morphine  - Injectable 2 milliGRAM(s) IV Push every 4 hours PRN Severe Pain (7 - 10)  ondansetron Injectable 4 milliGRAM(s) IV Push every 6 hours PRN Nausea and/or Vomiting      Vital Signs Last 24 Hrs  T(C): 36.7 (05 Sep 2019 12:14), Max: 36.7 (04 Sep 2019 14:00)  T(F): 98.1 (05 Sep 2019 12:14), Max: 98.1 (05 Sep 2019 12:14)  HR: 61 (05 Sep 2019 12:14) (61 - 91)  BP: 125/73 (05 Sep 2019 12:14) (108/68 - 148/71)  BP(mean): 88 (04 Sep 2019 17:00) (74 - 110)  RR: 18 (05 Sep 2019 12:14) (10 - 18)  SpO2: 97% (05 Sep 2019 12:14) (93% - 97%)  CAPILLARY BLOOD GLUCOSE        I&O's Summary    04 Sep 2019 07:01  -  05 Sep 2019 07:00  --------------------------------------------------------  IN: 240 mL / OUT: 1975 mL / NET: -1735 mL        PHYSICAL EXAM:  GENERAL: NAD  HEENT: neck supple  LUNG: Clear to auscultation bilaterally; No wheeze  HEART: S1, S2  ABDOMEN: Soft, Nontender, Nondistended, Bowel sounds present, + hardware palpated in RLQ  EXTREMITIES: No leg edema  PSYCH: normal affect, calm  NEUROLOGY: Alert and oriented, communicative, moves arms, lower extremity contracted  SKIN: warm and dry      LABS:                        9.3    6.15  )-----------( 221      ( 05 Sep 2019 08:37 )             30.9     09-05    141  |  106  |  8   ----------------------------<  93  4.0   |  25  |  0.67    Ca    9.2      05 Sep 2019 06:43  Phos  3.6     09-04  Mg     2.2     09-04    TPro  5.5<L>  /  Alb  3.0<L>  /  TBili  0.4  /  DBili  x   /  AST  10  /  ALT  7<L>  /  AlkPhos  87  09-05    PT/INR - ( 05 Sep 2019 09:24 )   PT: 11.4 sec;   INR: 1.01 ratio         PTT - ( 05 Sep 2019 09:24 )  PTT:34.1 sec          RADIOLOGY & ADDITIONAL TESTS:    Imaging Personally Reviewed:    < from: CT Angio Abdomen and Pelvis w/ IV Cont (08.30.19 @ 17:52) >  FINDINGS:    LOWER CHEST: There is pleural calcification versus suture material in the   imaged left lung base. There is mild posterior dependent atelectasis.   There is mild bronchiectasis. There is atelectasis in the lingula.    LIVER: Within normal limits.  BILE DUCTS: Normal caliber.  GALLBLADDER: Layering hyperdensity within the gallbladder likely   represents vicarious excretion of contrast.  SPLEEN: Within normal limits.  PANCREAS: Within normal limits.  ADRENALS: Within normal limits.  KIDNEYS/URETERS: Atrophic left kidney with mildly decreased enhancement.   There is a left ureteral stent again noted, appropriately positioned. A   large 1.2 x 0.8 cm calculus in the mid left ureter is unchanged. Punctate   nonobstructing calculi again noted in the left kidney.    BLADDER: Distended with distal tip of left ureteral stent.  REPRODUCTIVE ORGANS: Uterus and adnexa within normal limits.    BOWEL: There is no evidence for bleed/extravasation into the bowel. No   hypervascular bowel lesions are identified. No bowel obstruction.   Appendix is not visualized. No definite evidence of inflammation in the   pericecal region.  PERITONEUM: No ascites.  VESSELS: Again noted is a aneurysmally dilated and tortuous descending   thoracic aorta. Stable abdominal aortic dissection is again noted   extending into the right iliac artery. Tortuous collateral vessels are   again noted in the upper abdomen in the periportal and perisplenic   regions. An IVC filter is again noted.  RETROPERITONEUM/LYMPH NODES: No lymphadenopathy.    ABDOMINAL WALL: Mild diffuse soft tissue anasarca. Generator pack for a   stimulator is present in the left gluteal area. Another stimulator   generator pack in the right lower anterior abdominal wall.  BONES: Median sternotomy wires. Degenerative change of the lumbar spine.    IMPRESSION:     No CT evidence for active gastrointestinal bleed.    Stable abdominal aortic dissection.    < end of copied text >      Consultant(s) Notes Reviewed:  GI, surgery, ID    Care Discussed with Consultants/Other Providers: medicine NP

## 2019-09-05 NOTE — DIETITIAN INITIAL EVALUATION ADULT. - DIET TYPE
NPO after midnight + clear liquid 1. Recommend advance to regular diet once medically feasible. Will continue to monitor and adjust as needed. 2. Once feasible, recommend Health Shake 1xday (500 kcal and 20-25 g protein) to optimize kcal and protein intake and help with skin healing. Discussed diet and supplements with NP.

## 2019-09-05 NOTE — PROGRESS NOTE ADULT - PROBLEM SELECTOR PLAN 1
-Continue with daily CBCs & maintain active T&S, if suspicion for bleeding will perform more frequently  -NPO at midnight for EGD and Dr. Ambrocio evaluation  -PPI IV BID  -No chemical DVT PPx, continue with ICDs bilaterally -There was concern for ? Dieulafoy lesion at OSH, most recent EGD on 8/30 revealed pooled blood in gastric fundus and adherent clot that could not be removed. CTA a/p could not localize bleed and per IR at OSH, celiac axis too tortuous for mesenteric angiogram.  -NPO for EGD by Dr. Ambrocio today  -PPI IV BID  -Monitor H/H -There was concern for ? Dieulafoy lesion at OSH, most recent EGD on 8/30 revealed pooled blood in gastric fundus and adherent clot that could not be removed. CTA a/p could not localize bleed and per IR at OSH, celiac axis too tortuous for mesenteric angiogram. patient was also evaluated by surgery and there was discussion about possibility of exlap with gastrotomy which was deferred at this time.   -NPO for EGD by Dr. Ambrocio today  -PPI IV BID  -Monitor H/H

## 2019-09-05 NOTE — PROGRESS NOTE ADULT - PROBLEM SELECTOR PLAN 2
-Function and secondary to spinal hematoma  -Turn q2 hours to prevent pressure ulcer  -Follow up with nursing, patient may be developing stage 1-2 sacral decubitus ulcer  -Nursing for scheduled straight caths (patient has chronic urinary retention and is cathed by her  at home)  -Miralax for chronic stool retention (does periodic manual disimpactions at home) -secondary to spinal hematoma  -Nursing for scheduled straight caths (patient has chronic urinary retention and is cathed by her  at home)  -Miralax for chronic stool retention (does periodic manual disimpactions at home)

## 2019-09-05 NOTE — DIETITIAN INITIAL EVALUATION ADULT. - ENERGY NEEDS
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

## 2019-09-05 NOTE — DISCHARGE NOTE NURSING/CASE MANAGEMENT/SOCIAL WORK - PATIENT PORTAL LINK FT
You can access the FollowMyHealth Patient Portal offered by [facility] by registering at the following website: [email or web address]. By joining [facility]’s FollowMyHealth portal, you will also be able to view your health information using other applications (apps) compatible with our system.

## 2019-09-05 NOTE — DIETITIAN INITIAL EVALUATION ADULT. - OTHER INFO
Pt reports "very" good appetite and PO intake at home; reports not following any type of diet or restriction. Confirms NKFA. Reports taking Vitamin B, Vitamin C, and Vitamin D PTA.     Pt NPO after midnight started yesterday (09/04) previously on clear liquid diet x 1 day - pending EGD today. Pt states feeling hungry. Denies history of difficulty chewing/swallowing. Pt denies nausea, vomiting, diarrhea, or constipation, states last BM 2 days ago (09/03) due to being NPO. Denies weight changes PTA, states clothes fit her the same way, reports UBW between 110 - 115 pounds. Weight as per previous RD notes (08/13/2019) 116.8 pounds with edema -> (09/02/2019) 119 pounds with edema. Obtained bedscale weight (09/05) 111.9 pounds - will continue to monitor. Offered nutritional supplementation to help with skin healing once diet is advanced - pt only agreed to Qoture, spoke to NP.     Provided recommendations to optimize PO and protein intake and help with skin healing once diet is advanced, recommended ordering nutrient-dense snacks and leaving food away from tray for later consumption during the day or between meals, to start with protein, and sips of supplement throughout the day; reviewed foods with protein and menu order procedures in hospital. Pt and  amenable for education - made aware RD remains available.

## 2019-09-05 NOTE — DIETITIAN INITIAL EVALUATION ADULT. - PHYSICAL APPEARANCE
other (specify)/Performed nutrition focused physical exam with pt's consent and noted no signs of muscle/fat loss in any area./well nourished Ht: 63 inches stated UBW: 110 pounds BMI: 19.5 kg/m2 IBW: 115 (+/-10%) 95.6 %IBW  No noted edema as per flow sheets.   Skin: pressure ulcer in sacrum stage 2 as per documentation.

## 2019-09-05 NOTE — DIETITIAN INITIAL EVALUATION ADULT. - REASON INDICATOR FOR ASSESSMENT
Nutrition consult received for pressure ulcer >2.   Information obtained from: medical record, previous RD notes, and pt.   Pt forgetful at times as per documentation,  at bedside confirmed information.

## 2019-09-05 NOTE — PROGRESS NOTE ADULT - ASSESSMENT
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 While there patient suffered multiple re-bleed episodes and in total underwent four EGDs and received over 10 units pRBC. It appears that EGDs there only showed pooled blood and/or adherent clot with no active bleeding on endoscopy. Patient had unremarkable CT Angiogram. Patient transferred here for evaluation by Dr. Ambrocio. Hemoglobin has been stable for the past several days. 69 y/o F with PMHx of aortic dissection (post-repair several years prior), spinal cord hematoma (now functionally paraplegic), chronic spasticity and pain (on Oxycodone and Baclofen pump), HTN, nephrolithiasis s/p left urethral stent and endotheliitis/keratitis (post-corneal transplant). Patient had recent hospitalizations at [facility] for UGIB with acute blood loss anemia requiring multiple PRBC transfusions. Work up there included CTA a/p which was negative for extravasation and unable to localize source of bleed, s/p multiple EGD which showed pooled blood and/or adherent clot in gastric fundus that could not be removed. Patient transferred here for further management and evaluation by Dr. Ambrocio.

## 2019-09-05 NOTE — CONSULT NOTE ADULT - SUBJECTIVE AND OBJECTIVE BOX
Patient is a 68y old  Female who presents with a chief complaint of Transfer from 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 after suffering UGIB. Patient has complicated medical history and recent medical course not full reflected on chart review, but collateral obtained from . Patient was at home in early August 2019 when  though she was not mentating well. He took her vitals at that time and found her to have a systolic BP in the 70s and HR in the 150s. When he brought her to [facility] they found her Hg to be 4.4. Unknown if she was having melena or hematochezia at that time. While she was there patient underwent four EGDs and received more than 10 units PRBC. EGDs were unable to identify active source of bleeding and usually showed pooled blood. There is suspicion for dieulafoy, but unable to be acted upon at [facility] with the multiple EGDs at times only showing adherent clot. She was evaluated by surgery who recommended ex-lap given the severity, but patient's family deferred for now. Patient spent a majority of her time in [facility] at the ICU. CT angiogram could not identify active source of bleeding with IR evaluation stating they could not provide a solution given the patient's previous dissection and collateral arterial formation. Patient's PMD is Dr. Branham who recommended transfer to [facility] for evaluation by Dr. Ambrocio. When seen by me patient had no active complaints. She states her Baclofen pump is empty. She denies any hematochezia or melena. She is slightly fretful given the recent medical events and is hopeful that a solution can be found. (04 Sep 2019 20:17)"    Reviewed history and prior endoscopies.  Currently denies any abdominal pain, N V, F C.  Recently no anorexia or weight loss.      PAST MEDICAL & SURGICAL HISTORY:  Dorsalgia of lumbar region: on pain medication /baclofen po and pump  Self-catheterizes urinary bladder  Anemia: chronic  Uveitis  Osteoporosis  PAD (peripheral artery disease)  Hematoma: spinal  September  treated  September 2018  Paraplegia: on wheelchair goes to physical therapy 2 x weekly  Aortic dissection, thoracic: Type A Repaired 2009  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2018  UTI (urinary tract infection): stable x 3 months  TIA (transient ischemic attack)  HTN (Hypertension): on meds  History of corneal transplant: left corneal transplant on 5/21/2018  Disorder of spine: unthetethering 2 x  Presence of IVC filter: 2014 ?  S/P aortic dissection repair: Type A Dissection repair /2009   descending aortic aneurysm repair 9/2016  H/O Spinal surgery: laminectomies 2014      Allergies    No Known Allergies    Intolerances        MEDICATIONS  (STANDING):  artificial  tears Solution 1 Drop(s) Both EYES daily  atorvastatin 40 milliGRAM(s) Oral at bedtime  baclofen 20 milliGRAM(s) Oral three times a day  dextrose 5% + sodium chloride 0.45%. 1000 milliLiter(s) (60 mL/Hr) IV Continuous <Continuous>  DULoxetine 20 milliGRAM(s) Oral daily  gabapentin 600 milliGRAM(s) Oral three times a day  influenza   Vaccine 0.5 milliLiter(s) IntraMuscular once  pantoprazole  Injectable 40 milliGRAM(s) IV Push two times a day  polyethylene glycol 3350 17 Gram(s) Oral every 12 hours  prednisoLONE acetate 1% Suspension 1 Drop(s) Left EYE daily  valACYclovir 1000 milliGRAM(s) Oral daily    MEDICATIONS  (PRN):  acetaminophen   Tablet .. 650 milliGRAM(s) Oral every 6 hours PRN Mild Pain (1 - 3), Moderate Pain (4 - 6)  morphine  - Injectable 2 milliGRAM(s) IV Push every 4 hours PRN Severe Pain (7 - 10)  ondansetron Injectable 4 milliGRAM(s) IV Push every 6 hours PRN Nausea and/or Vomiting      Review of Systems:  No fever or chills. No chest pain, No SOB.  No pruritis or jaundice.  No diarrhea.    Vital Signs Last 24 Hrs  T(C): 36.7 (05 Sep 2019 12:14), Max: 36.7 (04 Sep 2019 14:00)  T(F): 98.1 (05 Sep 2019 12:14), Max: 98.1 (05 Sep 2019 12:14)  HR: 61 (05 Sep 2019 12:14) (61 - 91)  BP: 125/73 (05 Sep 2019 12:14) (108/68 - 148/71)  BP(mean): 88 (04 Sep 2019 17:00) (74 - 110)  RR: 18 (05 Sep 2019 12:14) (10 - 18)  SpO2: 97% (05 Sep 2019 12:14) (93% - 97%)    PHYSICAL EXAM:  Constitutional: NAD, well-developed  HEENT:  anicteric sclera  Neck:  Supple  Lungs:  Clear   Heart RRR  Abd soft non tender non distended    LABS:                        9.3    6.15  )-----------( 221      ( 05 Sep 2019 08:37 )             30.9     09-05    141  |  106  |  8   ----------------------------<  93  4.0   |  25  |  0.67    Ca    9.2      05 Sep 2019 06:43  Phos  3.6     09-04  Mg     2.2     09-04    TPro  5.5<L>  /  Alb  3.0<L>  /  TBili  0.4  /  DBili  x   /  AST  10  /  ALT  7<L>  /  AlkPhos  87  09-05    PT/INR - ( 05 Sep 2019 09:24 )   PT: 11.4 sec;   INR: 1.01 ratio         PTT - ( 05 Sep 2019 09:24 )  PTT:34.1 sec    LIVER FUNCTIONS - ( 05 Sep 2019 06:43 )  Alb: 3.0 g/dL / Pro: 5.5 g/dL / ALK PHOS: 87 U/L / ALT: 7 U/L / AST: 10 U/L / GGT: x             RADIOLOGY & ADDITIONAL TESTS:

## 2019-09-05 NOTE — CONSULT NOTE ADULT - ASSESSMENT
In summary, 67 yo female with numerous medical issues as stated above but with lift threatening UGI bleed without an identifiable source.  Currently, no evidence of active bleeding    Proceed with an EGD today and discussed at length with patient and .    Continue IV protonix bid  NPO    Donell Ambrocio MD

## 2019-09-06 NOTE — CHART NOTE - NSCHARTNOTEFT_GEN_A_CORE
Nutrition follow up     Nutrition verbal consult received per RN as pt's  requests education on dysphagia 2, mechanical soft diet.     Provided thorough education on dysphagia 2, mechanical soft diet, reviewed foods recommended and not recommended within each groups of food, described how to adjust diet recall and food preferences to this diet, reviewed its purpose, discussed how to optimize kcal and protein intake. Provided handout with education provided.  denies having further questions/concerns at this time.      RD remains available.   Meryl Kimble MS, RDN, #[phone] Nutrition follow up     Nutrition verbal consult received per RN as pt's  requests education on dysphagia 2, mechanical soft diet. Dietitian Initial Evaluation completed yesterday (09/05).    Chart, medications, and labs reviewed. Provided thorough education on dysphagia 2, mechanical soft diet, reviewed foods recommended and not recommended within each groups of food, described how to adjust diet recall and food preferences to this diet, reviewed its purpose, discussed how to optimize kcal and protein intake. Provided handout with education provided.  denies having further questions/concerns at this time.      RD remains available.   Meryl Kimble, MS, RDN, #[phone]

## 2019-09-06 NOTE — PHYSICAL THERAPY INITIAL EVALUATION ADULT - CRITERIA FOR SKILLED THERAPEUTIC INTERVENTIONS
impairments found/functional limitations in following categories/therapy frequency/anticipated discharge recommendation/rehab potential/predicted duration of therapy intervention/risk reduction/prevention

## 2019-09-06 NOTE — PHYSICAL THERAPY INITIAL EVALUATION ADULT - BALANCE DISTURBANCE, IDENTIFIED IMPAIRMENT CONTRIBUTE, REHAB EVAL
abnormal muscle tone/decreased strength/impaired postural control/impaired sensory feedback/impaired coordination/impaired motor control

## 2019-09-06 NOTE — PROGRESS NOTE ADULT - ASSESSMENT
In summary, UGI bleed without any obvious pathology by EGD yesterday except for moderate focal erosive gastritis. Possible Dieulafoy's lesion but not actively bleeding    Plan:    Continue to observe in hospital for signs of GI bleed.  If she rebleeds will need emergent EGD.    Continue IV pantoprazole    Donell Ambrocio MD

## 2019-09-06 NOTE — PHYSICAL THERAPY INITIAL EVALUATION ADULT - PRECAUTIONS/LIMITATIONS, REHAB EVAL
There is suspicion for dieulafoy, but unable to be acted upon at [place] with the multiple EGDs at times only showing adherent clot. She was evaluated by surgery who recommended ex-lap given the severity, but patient's family deferred for now. CT angiogram could not identify active source of bleeding with IR evaluation stating they could not provide a solution given the patient's previous dissection and collateral arterial formation. Pt s/p EGD at [facility], +gastritis s/p 2 clips, on PPI BID IV. fall precautions/There is suspicion for dieulafoy, but unable to be acted upon at [place] with the multiple EGDs at times only showing adherent clot. She was evaluated by surgery who recommended ex-lap given the severity, but patient's family deferred for now. CT angiogram could not identify active source of bleeding with IR evaluation stating they could not provide a solution given the patient's previous dissection and collateral arterial formation. Pt s/p EGD at [facility], +gastritis s/p 2 clips, on PPI BID IV.

## 2019-09-06 NOTE — PROGRESS NOTE ADULT - PROBLEM SELECTOR PLAN 6
-Continue with multi-modal pain management: Tylenol mild-mod pain, Morphine 2 q4h PRN severe pain, Baclofen 20 mg TID, Duloxetine 20 mg BID, and Gabapentin 600 TID  -Patient has baclofen pump which has run out and needs to be refilled. Patient has seen Dr. Nair prior. Await input from Dr. Nair if he is able to refill it while inpatient. PO baclofen is minimally helping.

## 2019-09-06 NOTE — PROGRESS NOTE ADULT - PROBLEM SELECTOR PLAN 2
-secondary to spinal hematoma  -straight cath (patient has chronic urinary retention and is cathed by her  at home)  -Miralax for chronic stool retention (does periodic manual disimpactions at home)

## 2019-09-06 NOTE — PHYSICAL THERAPY INITIAL EVALUATION ADULT - PASSIVE RANGE OF MOTION EXAMINATION, REHAB EVAL
except B knee ext limited ~ -15 degrees/bilateral lower extremity Passive ROM was WFL (within functional limits)

## 2019-09-06 NOTE — PROGRESS NOTE ADULT - PROBLEM SELECTOR PLAN 1
-There was concern for ? Dieulafoy lesion, most recent EGD on 8/30 revealed pooled blood in gastric fundus and adherent clot that could not be removed. CTA a/p could not localize bleed and per IR at OSH, celiac axis too tortuous for mesenteric angiogram. patient was also evaluated by surgery and there was discussion about possibility of exlap with gastrotomy which was deferred at this time.   -s/p EGD by Dr. Ambrocio on 9/5 which showed acute gastritis, gastric polyps with no source of GI bleed identified  -PPI IV BID  -Monitor H/H (slight decrease in H/H today may be 2/2 dilutional effect from IVF). will check repeat CBC -There was concern for ? Dieulafoy lesion, most recent EGD on 8/30 revealed pooled blood in gastric fundus and adherent clot that could not be removed. CTA a/p could not localize bleed and per IR at OSH, celiac axis too tortuous for mesenteric angiogram. patient was also evaluated by surgery and there was discussion about possibility of exlap with gastrotomy which was deferred at this time.   -s/p EGD by Dr. Ambrocio on 9/5 which showed acute gastritis, gastric polyps with no source of GI bleed identified  -PPI IV BID  -Monitor H/H (slight decrease in H/H today may be 2/2 dilutional effect from IVF). Repeat CBC stable  -Per discussion with Dr. Ambrocio, would rec monitoring patient inpatient over the weekend, if she rebleeds over the weekend, MICU consult with urgent EGD.

## 2019-09-06 NOTE — PROGRESS NOTE ADULT - PROBLEM SELECTOR PLAN 4
-Patient had recent left sided nephrolithiasis with urethral stent placement  -Outpatient urology follow up

## 2019-09-06 NOTE — PHYSICAL THERAPY INITIAL EVALUATION ADULT - PERTINENT HX OF CURRENT PROBLEM, REHAB EVAL
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

## 2019-09-06 NOTE — PROGRESS NOTE ADULT - SUBJECTIVE AND OBJECTIVE BOX
Patient is a 67 yo female- known to me- h/o paraplegia- has pump- getting baclofen and morphine via Dr. Castro.  Admitted with UGI Bleed.  Had spasticity earlier this morning- now better.    MEDICATIONS  (STANDING):  artificial  tears Solution 1 Drop(s) Both EYES daily  ascorbic acid 500 milliGRAM(s) Oral daily  atorvastatin 40 milliGRAM(s) Oral at bedtime  baclofen 20 milliGRAM(s) Oral three times a day  DULoxetine 20 milliGRAM(s) Oral two times a day  gabapentin 600 milliGRAM(s) Oral three times a day  influenza   Vaccine 0.5 milliLiter(s) IntraMuscular once  lidocaine   Patch 1 Patch Transdermal daily  multivitamin 1 Tablet(s) Oral daily  pantoprazole  Injectable 40 milliGRAM(s) IV Push two times a day  prednisoLONE acetate 1% Suspension 1 Drop(s) Left EYE daily  valACYclovir 1000 milliGRAM(s) Oral daily    MEDICATIONS  (PRN):  acetaminophen   Tablet .. 650 milliGRAM(s) Oral every 6 hours PRN Mild Pain (1 - 3), Moderate Pain (4 - 6)  morphine  - Injectable 2 milliGRAM(s) IV Push every 4 hours PRN Severe Pain (7 - 10)  ondansetron Injectable 4 milliGRAM(s) IV Push every 6 hours PRN Nausea and/or Vomiting  polyethylene glycol 3350 17 Gram(s) Oral every 12 hours PRN Constipation  sodium chloride 2% Ophthalmic Solution 1 Drop(s) Left EYE four times a day PRN dryness  zolpidem 5 milliGRAM(s) Oral at bedtime PRN Insomnia    Vital Signs Last 24 Hrs  T(C): 36.8 (06 Sep 2019 04:32), Max: 36.8 (06 Sep 2019 04:32)  T(F): 98.3 (06 Sep 2019 04:32), Max: 98.3 (06 Sep 2019 04:32)  HR: 97 (06 Sep 2019 11:36) (82 - 98)  BP: 151/87 (06 Sep 2019 11:36) (120/75 - 151/87)  BP(mean): --  RR: 18 (06 Sep 2019 04:32) (18 - 18)  SpO2: 95% (06 Sep 2019 11:36) (95% - 97%)    In NAD, pleasant  On clinitron bed  No edema, nml DPP  LEs 0/5  MAS 1 spasticity of bl LEs    Imp/Recs:  - Patients low alarm date for pump is 9/22- if not discharged by that date i will do refill- however, preferable to have Dr. Castro do refill with pain meds as well - advised to make appt w him as outpatient so if discharged can see him.  - Continue local care/ specialty bed  - f/u w me as outpatient.

## 2019-09-06 NOTE — PROGRESS NOTE ADULT - SUBJECTIVE AND OBJECTIVE BOX
Patient is a 68y old  Female who presents with a chief complaint of Transfer from [facility] for UGIB (06 Sep 2019 07:19)      SUBJECTIVE / OVERNIGHT EVENTS: no melena or hematochezia. no ab pain. + back pain which is not new.     MEDICATIONS  (STANDING):  artificial  tears Solution 1 Drop(s) Both EYES daily  ascorbic acid 500 milliGRAM(s) Oral daily  atorvastatin 40 milliGRAM(s) Oral at bedtime  baclofen 20 milliGRAM(s) Oral three times a day  bisacodyl Suppository 10 milliGRAM(s) Rectal once  DULoxetine 20 milliGRAM(s) Oral two times a day  gabapentin 600 milliGRAM(s) Oral three times a day  influenza   Vaccine 0.5 milliLiter(s) IntraMuscular once  lidocaine   Patch 1 Patch Transdermal daily  multivitamin 1 Tablet(s) Oral daily  pantoprazole  Injectable 40 milliGRAM(s) IV Push two times a day  prednisoLONE acetate 1% Suspension 1 Drop(s) Left EYE daily  valACYclovir 1000 milliGRAM(s) Oral daily    MEDICATIONS  (PRN):  acetaminophen   Tablet .. 650 milliGRAM(s) Oral every 6 hours PRN Mild Pain (1 - 3), Moderate Pain (4 - 6)  morphine  - Injectable 2 milliGRAM(s) IV Push every 4 hours PRN Severe Pain (7 - 10)  ondansetron Injectable 4 milliGRAM(s) IV Push every 6 hours PRN Nausea and/or Vomiting  polyethylene glycol 3350 17 Gram(s) Oral every 12 hours PRN Constipation  sodium chloride 2% Ophthalmic Solution 1 Drop(s) Left EYE four times a day PRN dryness  zolpidem 5 milliGRAM(s) Oral at bedtime PRN Insomnia      Vital Signs Last 24 Hrs  T(C): 36.8 (06 Sep 2019 04:32), Max: 36.8 (06 Sep 2019 04:32)  T(F): 98.3 (06 Sep 2019 04:32), Max: 98.3 (06 Sep 2019 04:32)  HR: 98 (06 Sep 2019 04:32) (61 - 98)  BP: 120/75 (06 Sep 2019 04:32) (120/75 - 136/63)  BP(mean): --  RR: 18 (06 Sep 2019 04:32) (18 - 18)  SpO2: 97% (06 Sep 2019 04:32) (95% - 97%)  CAPILLARY BLOOD GLUCOSE        I&O's Summary    05 Sep 2019 07:01  -  06 Sep 2019 07:00  --------------------------------------------------------  IN: 360 mL / OUT: 1600 mL / NET: -1240 mL        PHYSICAL EXAM:  GENERAL: NAD  HEENT: neck supple  LUNG: Clear to auscultation bilaterally; No wheeze  HEART: S1, S2  ABDOMEN: Soft, Nontender, Nondistended, Bowel sounds present, + hardware palpated in RLQ  EXTREMITIES: No leg edema  PSYCH: normal affect, calm  NEUROLOGY: AAOx3, communicative, moves arms, lower extremity contracted  SKIN: warm and dry        LABS:                        8.0    9.17  )-----------( 344      ( 06 Sep 2019 10:25 )             26.5     09-06    141  |  105  |  11  ----------------------------<  95  4.1   |  25  |  0.63    Ca    8.5      06 Sep 2019 07:04    TPro  5.5<L>  /  Alb  3.0<L>  /  TBili  0.4  /  DBili  x   /  AST  10  /  ALT  7<L>  /  AlkPhos  87  09-05    PT/INR - ( 05 Sep 2019 09:24 )   PT: 11.4 sec;   INR: 1.01 ratio         PTT - ( 05 Sep 2019 09:24 )  PTT:34.1 sec          RADIOLOGY & ADDITIONAL TESTS:    Imaging Personally Reviewed:    < from: Upper Endoscopy (09.05.19 @ 13:50) >  Impression:          - Normal esophagus.                       - Acute gastritis. Clips (MR conditional) were placed.                       - A few gastric polyps.                       - Gastritis.                       - Normal ampulla, duodenal bulb, 2nd part of the duodenum and 3rd part of the                        duodenum.                       - No specimens collected.                       No source of GI bleed now. Only moderated focal gastritis, unlikely source                        but placed 2 clips.                       NO GASTRIC VARICES OR PUD. POSSIBLE DIEULAFOY'S LESION BUT DESPITE CAREFUL                        AND THROUGH ASSESSMENT, NO SIGNS OF IT.                       DESPITE NEGATIVE EXAM, SUSPECT GI BLEED FROM THE STOMACH. SHE DOES NOT NEED                        COLON OR A PILLCAM STUDY.    < end of copied text >    Consultant(s) Notes Reviewed:  GI    Care Discussed with Consultants/Other Providers: medicine NP Patient is a 68y old  Female who presents with a chief complaint of Transfer from [facility] for UGIB (06 Sep 2019 07:19)      SUBJECTIVE / OVERNIGHT EVENTS: no melena or hematochezia. no ab pain. + back pain which is not new.     MEDICATIONS  (STANDING):  artificial  tears Solution 1 Drop(s) Both EYES daily  ascorbic acid 500 milliGRAM(s) Oral daily  atorvastatin 40 milliGRAM(s) Oral at bedtime  baclofen 20 milliGRAM(s) Oral three times a day  bisacodyl Suppository 10 milliGRAM(s) Rectal once  DULoxetine 20 milliGRAM(s) Oral two times a day  gabapentin 600 milliGRAM(s) Oral three times a day  influenza   Vaccine 0.5 milliLiter(s) IntraMuscular once  lidocaine   Patch 1 Patch Transdermal daily  multivitamin 1 Tablet(s) Oral daily  pantoprazole  Injectable 40 milliGRAM(s) IV Push two times a day  prednisoLONE acetate 1% Suspension 1 Drop(s) Left EYE daily  valACYclovir 1000 milliGRAM(s) Oral daily    MEDICATIONS  (PRN):  acetaminophen   Tablet .. 650 milliGRAM(s) Oral every 6 hours PRN Mild Pain (1 - 3), Moderate Pain (4 - 6)  morphine  - Injectable 2 milliGRAM(s) IV Push every 4 hours PRN Severe Pain (7 - 10)  ondansetron Injectable 4 milliGRAM(s) IV Push every 6 hours PRN Nausea and/or Vomiting  polyethylene glycol 3350 17 Gram(s) Oral every 12 hours PRN Constipation  sodium chloride 2% Ophthalmic Solution 1 Drop(s) Left EYE four times a day PRN dryness  zolpidem 5 milliGRAM(s) Oral at bedtime PRN Insomnia      Vital Signs Last 24 Hrs  T(C): 36.8 (06 Sep 2019 04:32), Max: 36.8 (06 Sep 2019 04:32)  T(F): 98.3 (06 Sep 2019 04:32), Max: 98.3 (06 Sep 2019 04:32)  HR: 98 (06 Sep 2019 04:32) (61 - 98)  BP: 120/75 (06 Sep 2019 04:32) (120/75 - 136/63)  BP(mean): --  RR: 18 (06 Sep 2019 04:32) (18 - 18)  SpO2: 97% (06 Sep 2019 04:32) (95% - 97%)  CAPILLARY BLOOD GLUCOSE        I&O's Summary    05 Sep 2019 07:01  -  06 Sep 2019 07:00  --------------------------------------------------------  IN: 360 mL / OUT: 1600 mL / NET: -1240 mL        PHYSICAL EXAM:  GENERAL: NAD  HEENT: neck supple  LUNG: Clear to auscultation bilaterally; No wheeze  HEART: S1, S2  ABDOMEN: Soft, Nontender, Nondistended, Bowel sounds present, + hardware palpated in RLQ  EXTREMITIES: No leg edema  PSYCH: normal affect, calm  NEUROLOGY: AAOx3, communicative, moves arms, lower extremity contracted  SKIN: warm and dry        LABS:                        8.0    9.17  )-----------( 344      ( 06 Sep 2019 10:25 )             26.5     09-06    141  |  105  |  11  ----------------------------<  95  4.1   |  25  |  0.63    Ca    8.5      06 Sep 2019 07:04    TPro  5.5<L>  /  Alb  3.0<L>  /  TBili  0.4  /  DBili  x   /  AST  10  /  ALT  7<L>  /  AlkPhos  87  09-05    PT/INR - ( 05 Sep 2019 09:24 )   PT: 11.4 sec;   INR: 1.01 ratio         PTT - ( 05 Sep 2019 09:24 )  PTT:34.1 sec          RADIOLOGY & ADDITIONAL TESTS:    Imaging Personally Reviewed:    < from: Upper Endoscopy (09.05.19 @ 13:50) >  Impression:          - Normal esophagus.                       - Acute gastritis. Clips (MR conditional) were placed.                       - A few gastric polyps.                       - Gastritis.                       - Normal ampulla, duodenal bulb, 2nd part of the duodenum and 3rd part of the                        duodenum.                       - No specimens collected.                       No source of GI bleed now. Only moderated focal gastritis, unlikely source                        but placed 2 clips.                       NO GASTRIC VARICES OR PUD. POSSIBLE DIEULAFOY'S LESION BUT DESPITE CAREFUL                        AND THROUGH ASSESSMENT, NO SIGNS OF IT.                       DESPITE NEGATIVE EXAM, SUSPECT GI BLEED FROM THE STOMACH. SHE DOES NOT NEED                        COLON OR A PILLCAM STUDY.    < end of copied text >    Consultant(s) Notes Reviewed:  GI    Care Discussed with Consultants/Other Providers: medicine NP, GI attending (Dr. Ambrocio) regarding further plan of care

## 2019-09-06 NOTE — PHYSICAL THERAPY INITIAL EVALUATION ADULT - ADDITIONAL COMMENTS
complains of pain/discomfort PTA pt lives in pvt home with , +ramp to enter, is nonambulatory, has [facility]HA (private pay) 9-1pm, owns hospital bed, air mattress, W/C. PTA pt lives in pvt home with , +ramp to enter, is nonambulatory, has [facility]HA (private pay) 9-1pm for ADLs, owns hospital bed, air mattress, W/C, as per pt's  Alexis pt usually transfer to W/C using sliding board with supervision, req assist to move BLE to EOB.

## 2019-09-06 NOTE — PROGRESS NOTE ADULT - ASSESSMENT
67 y/o F with PMHx of aortic dissection (post-repair several years prior), spinal cord hematoma (now functionally paraplegic), chronic spasticity and pain (on Oxycodone and Baclofen pump), HTN, nephrolithiasis s/p left urethral stent, UTIs and endotheliitis/keratitis (post-corneal transplant). Patient had recent hospitalizations at [facility] for UGIB with acute blood loss anemia requiring multiple PRBC transfusions. Work up there included CTA a/p which was negative and unable to localize source of bleed, s/p multiple EGD which showed pooled blood and/or adherent clot in gastric fundus that could not be removed. Patient transferred here for further management and evaluation by Dr. Ambrocio. Patient is s/p repeat EGD on 9/5 that showed acute gastritis, gastric polyps with no source of GI bleed identified.

## 2019-09-06 NOTE — PROGRESS NOTE ADULT - SUBJECTIVE AND OBJECTIVE BOX
INTERVAL HPI/OVERNIGHT EVENTS:    No events overnight    MEDICATIONS  (STANDING):  artificial  tears Solution 1 Drop(s) Both EYES daily  ascorbic acid 500 milliGRAM(s) Oral daily  atorvastatin 40 milliGRAM(s) Oral at bedtime  baclofen 20 milliGRAM(s) Oral three times a day  DULoxetine 20 milliGRAM(s) Oral two times a day  gabapentin 600 milliGRAM(s) Oral three times a day  influenza   Vaccine 0.5 milliLiter(s) IntraMuscular once  lidocaine   Patch 1 Patch Transdermal daily  multivitamin 1 Tablet(s) Oral daily  pantoprazole  Injectable 40 milliGRAM(s) IV Push two times a day  prednisoLONE acetate 1% Suspension 1 Drop(s) Left EYE daily  valACYclovir 1000 milliGRAM(s) Oral daily    MEDICATIONS  (PRN):  acetaminophen   Tablet .. 650 milliGRAM(s) Oral every 6 hours PRN Mild Pain (1 - 3), Moderate Pain (4 - 6)  morphine  - Injectable 2 milliGRAM(s) IV Push every 4 hours PRN Severe Pain (7 - 10)  ondansetron Injectable 4 milliGRAM(s) IV Push every 6 hours PRN Nausea and/or Vomiting  polyethylene glycol 3350 17 Gram(s) Oral every 12 hours PRN Constipation  sodium chloride 2% Ophthalmic Solution 1 Drop(s) Left EYE four times a day PRN dryness  zolpidem 5 milliGRAM(s) Oral at bedtime PRN Insomnia      Allergies    No Known Allergies    Intolerances          Vital Signs Last 24 Hrs  T(C): 36.8 (06 Sep 2019 04:32), Max: 36.8 (06 Sep 2019 04:32)  T(F): 98.3 (06 Sep 2019 04:32), Max: 98.3 (06 Sep 2019 04:32)  HR: 98 (06 Sep 2019 04:32) (61 - 98)  BP: 120/75 (06 Sep 2019 04:32) (120/75 - 136/63)  BP(mean): --  RR: 18 (06 Sep 2019 04:32) (18 - 18)  SpO2: 97% (06 Sep 2019 04:32) (95% - 97%)    PHYSICAL EXAM:  Constitutional: NAD, well-developed  Neck: No LAD, supple  Respiratory: CTAB  Cardiovascular: S1 and S2, RRR,   Gastrointestinal: BS+, soft, NT/ND, neg HSM,      LABS:                        10.2   5.2   )-----------( 219      ( 05 Sep 2019 16:31 )             31.4     09-05    141  |  106  |  8   ----------------------------<  93  4.0   |  25  |  0.67    Ca    9.2      05 Sep 2019 06:43    TPro  5.5<L>  /  Alb  3.0<L>  /  TBili  0.4  /  DBili  x   /  AST  10  /  ALT  7<L>  /  AlkPhos  87  09-05    PT/INR - ( 05 Sep 2019 09:24 )   PT: 11.4 sec;   INR: 1.01 ratio         PTT - ( 05 Sep 2019 09:24 )  PTT:34.1 sec    LIVER FUNCTIONS - ( 05 Sep 2019 06:43 )  Alb: 3.0 g/dL / Pro: 5.5 g/dL / ALK PHOS: 87 U/L / ALT: 7 U/L / AST: 10 U/L / GGT: x             RADIOLOGY & ADDITIONAL TESTS:

## 2019-09-06 NOTE — PROGRESS NOTE ADULT - PROBLEM SELECTOR PLAN 5
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

## 2019-09-06 NOTE — PROGRESS NOTE ADULT - PROBLEM SELECTOR PLAN 9
-PT eval pending. patient has privately hired HHA 6/7 days a week. Anticipate d/c plan home pending GI rec. -PT eval pending. patient has privately hired HHA 6/7 days a week.

## 2019-09-07 NOTE — PROGRESS NOTE ADULT - PROBLEM SELECTOR PLAN 7
-Continue with multi-modal pain management: Tylenol mild-mod pain, Morphine 2 q4h PRN severe pain, Baclofen 20 mg TID, Duloxetine 20 mg BID, and Gabapentin 600 TID  -Patient has baclofen pump - due for refill 9/22

## 2019-09-07 NOTE — PROGRESS NOTE ADULT - SUBJECTIVE AND OBJECTIVE BOX
INTERVAL HPI/OVERNIGHT EVENTS:  covering for Dr. Ambrocio  no issues overnight.   unclear if having any melena  tremor of her arms has worsened.     MEDICATIONS  (STANDING):  artificial  tears Solution 1 Drop(s) Both EYES daily  ascorbic acid 500 milliGRAM(s) Oral daily  atorvastatin 40 milliGRAM(s) Oral at bedtime  baclofen 20 milliGRAM(s) Oral four times a day  DULoxetine 20 milliGRAM(s) Oral two times a day  gabapentin 600 milliGRAM(s) Oral three times a day  influenza   Vaccine 0.5 milliLiter(s) IntraMuscular once  lidocaine   Patch 1 Patch Transdermal daily  multivitamin 1 Tablet(s) Oral daily  pantoprazole  Injectable 40 milliGRAM(s) IV Push two times a day  prednisoLONE acetate 1% Suspension 1 Drop(s) Left EYE daily  valACYclovir 1000 milliGRAM(s) Oral daily    MEDICATIONS  (PRN):  acetaminophen   Tablet .. 650 milliGRAM(s) Oral every 6 hours PRN Mild Pain (1 - 3), Moderate Pain (4 - 6)  morphine  - Injectable 2 milliGRAM(s) IV Push every 4 hours PRN Severe Pain (7 - 10)  ondansetron Injectable 4 milliGRAM(s) IV Push every 6 hours PRN Nausea and/or Vomiting  polyethylene glycol 3350 17 Gram(s) Oral every 12 hours PRN Constipation  sodium chloride 2% Ophthalmic Solution 1 Drop(s) Left EYE four times a day PRN dryness  zolpidem 5 milliGRAM(s) Oral at bedtime PRN Insomnia      Allergies    No Known Allergies    Intolerances        Vital Signs Last 24 Hrs  T(C): 36.6 (07 Sep 2019 06:07), Max: 37.1 (06 Sep 2019 17:32)  T(F): 97.9 (07 Sep 2019 06:07), Max: 98.8 (06 Sep 2019 20:52)  HR: 78 (07 Sep 2019 06:07) (78 - 97)  BP: 143/74 (07 Sep 2019 06:07) (143/74 - 155/73)  BP(mean): --  RR: 18 (07 Sep 2019 06:07) (18 - 18)  SpO2: 98% (07 Sep 2019 06:07) (95% - 98%)    PHYSICAL EXAM:  General: comfortable in No acute distress  CV: regular rate and rhythm. no murmurs rubs or gallops  Lungs: clear to ascultation bilaterally  abdomen: soft nontender nondistened normal bowel sounds. baclofen pump palpated  ext: negative edema  skin: no rashes noted    LABS:                        11.8   8.2   )-----------( 372      ( 06 Sep 2019 11:32 )             36.2     09-07    143  |  106  |  31<H>  ----------------------------<  106<H>  3.7   |  27  |  0.51    Ca    9.3      07 Sep 2019 07:16          LIVER FUNCTIONS - ( 05 Sep 2019 06:43 )  Alb: 3.0 g/dL / Pro: 5.5 g/dL / ALK PHOS: 87 U/L / ALT: 7 U/L / AST: 10 U/L / GGT: x             RADIOLOGY & ADDITIONAL TESTS:

## 2019-09-07 NOTE — CHART NOTE - NSCHARTNOTEFT_GEN_A_CORE
s/p dark, tarry stool X1 this afternoon. repeat H&H 7.9 & 23.9, was 8.1 & 24 this morning. Vital signs stable. pt denies dizziness, SOB, or abdominal pain. Spoke with GI Dr. Crouch who wanted repeat CBC tonight and will decide whether pt need upper endoscopy. Dr. Crouch wants to be called with H & H result tonight at [phone]. Continue to monitor for further GI bleed. Attending Dr. Malcolm made aware    Neelima Escoto NP-C  #[id number]

## 2019-09-07 NOTE — PROGRESS NOTE ADULT - ASSESSMENT
69 y/o F with PMHx of aortic dissection (post-repair several years prior), spinal cord hematoma (now functionally paraplegic), chronic spasticity and pain (on Oxycodone and Baclofen pump), HTN, nephrolithiasis s/p left urethral stent, UTIs and endotheliitis/keratitis (post-corneal transplant). Patient had recent hospitalizations at [facility] for UGIB with acute blood loss anemia requiring multiple PRBC transfusions. Work up there included CTA a/p which was negative and unable to localize source of bleed, s/p multiple EGD which showed pooled blood and/or adherent clot in gastric fundus that could not be removed. Patient transferred here for further management and evaluation by Dr. Ambrocio. Patient is s/p repeat EGD on 9/5 that showed acute gastritis, gastric polyps with no source of GI bleed identified.

## 2019-09-07 NOTE — CHART NOTE - NSCHARTNOTEFT_GEN_A_CORE
D/W Dr. Crouch, regarding afternoon CBC, hgb 7.2, drop from 7.9, patient stable, in NAD, VSS. Per GI evaluate morning cbc and if with drop, will transfuse. will continue to monitor, will endorse to AM team.     Red German PA-C  Department of Medicine D/W Dr. Crouch, regarding afternoon CBC, hgb 7.2, drop from 7.9, patient stable, in NAD, VSS. Per GI evaluate morning cbc and if with drop, will transfuse, maintain NPO status. will continue to monitor, will endorse to AM team.     Red German PA-C  Department of Medicine

## 2019-09-07 NOTE — PROGRESS NOTE ADULT - PROBLEM SELECTOR PLAN 1
-There was concern for questionale Dieulafoy lesion, most recent EGD on 8/30 revealed pooled blood in gastric fundus and adherent clot that could not be removed. CTA a/p could not localize bleed and per IR at OSH, celiac axis too tortuous for mesenteric angiogram. patient was also evaluated by surgery and there was discussion about possibility of exlap with gastrotomy which was deferred at this time.   -s/p EGD by Dr. Ambrocio on 9/5 which showed acute gastritis, gastric polyps with no source of GI bleed identified  -PPI IV BID  -Monitor H/H / Hb low this am, will repeat stat  -GI would rec monitoring patient inpatient over the weekend, if she rebleeds over the weekend, MICU consult with urgent EGD.

## 2019-09-07 NOTE — PROGRESS NOTE ADULT - ASSESSMENT
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   given no bleeding for almost one week would monitor one additional day and if hgb remains stable then ok to discharge home on twice daily protonix  if rebleeds at home then must return to [facility] for emergent endoscopy  if evidence of bleeding while in hospital will repeat urgent EGD.  await repeat cbc today. has conjuntival pallor noted.   call with questions  [phone]

## 2019-09-07 NOTE — PROGRESS NOTE ADULT - SUBJECTIVE AND OBJECTIVE BOX
Patient is a 68y old  Female who presents with a chief complaint of Transfer from [facility] for UGIB (07 Sep 2019 11:08)      SUBJECTIVE / OVERNIGHT EVENTS:    patient seen and examined. no blood in the stool. c/o of left eye pain and crust    ROS:  14 point ROS negative in detail except stated as above    MEDICATIONS  (STANDING):  artificial  tears Solution 1 Drop(s) Both EYES daily  ascorbic acid 500 milliGRAM(s) Oral daily  atorvastatin 40 milliGRAM(s) Oral at bedtime  baclofen 20 milliGRAM(s) Oral four times a day  DULoxetine 20 milliGRAM(s) Oral two times a day  gabapentin 600 milliGRAM(s) Oral three times a day  influenza   Vaccine 0.5 milliLiter(s) IntraMuscular once  lidocaine   Patch 1 Patch Transdermal daily  multivitamin 1 Tablet(s) Oral daily  ofloxacin 0.3% Solution 2 Drop(s) Left EYE two times a day  pantoprazole  Injectable 40 milliGRAM(s) IV Push two times a day  prednisoLONE acetate 1% Suspension 1 Drop(s) Left EYE daily  valACYclovir 1000 milliGRAM(s) Oral daily    MEDICATIONS  (PRN):  acetaminophen   Tablet .. 650 milliGRAM(s) Oral every 6 hours PRN Mild Pain (1 - 3), Moderate Pain (4 - 6)  morphine  - Injectable 2 milliGRAM(s) IV Push every 4 hours PRN Severe Pain (7 - 10)  ondansetron Injectable 4 milliGRAM(s) IV Push every 6 hours PRN Nausea and/or Vomiting  polyethylene glycol 3350 17 Gram(s) Oral every 12 hours PRN Constipation  sodium chloride 2% Ophthalmic Solution 1 Drop(s) Left EYE four times a day PRN dryness  zolpidem 5 milliGRAM(s) Oral at bedtime PRN Insomnia      CAPILLARY BLOOD GLUCOSE        I&O's Summary    06 Sep 2019 07:01  -  07 Sep 2019 07:00  --------------------------------------------------------  IN: 1880 mL / OUT: 2250 mL / NET: -370 mL    07 Sep 2019 07:01  -  07 Sep 2019 11:44  --------------------------------------------------------  IN: 0 mL / OUT: 400 mL / NET: -400 mL        PHYSICAL EXAM:  Vital Signs Last 24 Hrs  T(C): 36.6 (07 Sep 2019 06:07), Max: 37.1 (06 Sep 2019 17:32)  T(F): 97.9 (07 Sep 2019 06:07), Max: 98.8 (06 Sep 2019 20:52)  HR: 78 (07 Sep 2019 06:07) (78 - 92)  BP: 143/74 (07 Sep 2019 06:07) (143/74 - 155/73)  BP(mean): --  RR: 18 (07 Sep 2019 06:07) (18 - 18)  SpO2: 98% (07 Sep 2019 06:07) (97% - 98%)      PHYSICAL EXAM:  GENERAL: NAD  Eyes: Left eye conjuctival erythema + yellow pus on lids  LUNG: Clear to auscultation bilaterally; No wheezes rales or rhonchi  HEART: S1, S2 rrr no murmurs  ABDOMEN: Soft, Nontender, Nondistended, Bowel sounds present, + hardware palpated in RLQ  EXTREMITIES: No leg edema cyanosis or clubbing  NEUROLOGY: AAOx3, communicative, moves arms, lower extremity contracted  SKIN: warm and dry      LABS:                        7.0    7.64  )-----------( 300      ( 07 Sep 2019 10:37 )             23.4     09-07    143  |  106  |  31<H>  ----------------------------<  106<H>  3.7   |  27  |  0.51    Ca    9.3      07 Sep 2019 07:16                RADIOLOGY & ADDITIONAL TESTS:    Imaging Personally Reviewed:    Consultant(s) Notes Reviewed:      Care Discussed with Consultants/Other Providers:

## 2019-09-08 NOTE — CHART NOTE - NSCHARTNOTEFT_GEN_A_CORE
Event Summary:  Evening CBC with downtrending H/H 7.1/23.3 -> 6.8/21.1. Patient asymptomatic at hemodynamically stable. Denies any active bleeding, melena/hematochezia, hematuria, hemoptysis.  Ordered for 1u PRBC. Patient previously transfused at [facility], pt denies history of transfusion reaction or need for pre-medication. Explained risks and benefits of blood transfusion with patient. Consent signed and placed in chart.   GI currently following, possible repeat EGD in AM. Will keep patient NPO overnight. Continue to monitor CBC and for signs of GI bleed.      Suyapa Krishnamurthy PA-C  Department of Medicine

## 2019-09-08 NOTE — PROVIDER CONTACT NOTE (CRITICAL VALUE NOTIFICATION) - ASSESSMENT
Patient is alert and oriented x4, no acute distress noted. VS stable. pt denies any pain. no bleeding noted.

## 2019-09-08 NOTE — PROGRESS NOTE ADULT - SUBJECTIVE AND OBJECTIVE BOX
Patient is a 68y old  Female who presents with a chief complaint of Transfer from [facility] for UGIB (07 Sep 2019 11:44)      SUBJECTIVE / OVERNIGHT EVENTS:    patient seen and examined. c.o of left eye pain. wants to see an eye doctor.   one episode of black stool o/n    ROS:  14 point ROS negative in detail except stated as above    MEDICATIONS  (STANDING):  artificial  tears Solution 1 Drop(s) Both EYES daily  ascorbic acid 500 milliGRAM(s) Oral daily  atorvastatin 40 milliGRAM(s) Oral at bedtime  baclofen 20 milliGRAM(s) Oral four times a day  DULoxetine 20 milliGRAM(s) Oral two times a day  gabapentin 600 milliGRAM(s) Oral three times a day  influenza   Vaccine 0.5 milliLiter(s) IntraMuscular once  lidocaine   Patch 1 Patch Transdermal daily  multivitamin 1 Tablet(s) Oral daily  ofloxacin 0.3% Solution 2 Drop(s) Left EYE two times a day  pantoprazole  Injectable 40 milliGRAM(s) IV Push two times a day  prednisoLONE acetate 1% Suspension 1 Drop(s) Left EYE daily  valACYclovir 1000 milliGRAM(s) Oral daily    MEDICATIONS  (PRN):  acetaminophen   Tablet .. 650 milliGRAM(s) Oral every 6 hours PRN Mild Pain (1 - 3), Moderate Pain (4 - 6)  morphine  - Injectable 2 milliGRAM(s) IV Push every 4 hours PRN Severe Pain (7 - 10)  ondansetron Injectable 4 milliGRAM(s) IV Push every 6 hours PRN Nausea and/or Vomiting  polyethylene glycol 3350 17 Gram(s) Oral every 12 hours PRN Constipation  sodium chloride 2% Ophthalmic Solution 1 Drop(s) Left EYE four times a day PRN dryness  zolpidem 5 milliGRAM(s) Oral at bedtime PRN Insomnia      CAPILLARY BLOOD GLUCOSE        I&O's Summary    07 Sep 2019 07:01  -  08 Sep 2019 07:00  --------------------------------------------------------  IN: 240 mL / OUT: 2100 mL / NET: -1860 mL        PHYSICAL EXAM:  Vital Signs Last 24 Hrs  T(C): 36.4 (08 Sep 2019 05:15), Max: 36.9 (07 Sep 2019 12:17)  T(F): 97.5 (08 Sep 2019 05:15), Max: 98.4 (07 Sep 2019 12:17)  HR: 96 (08 Sep 2019 05:15) (87 - 110)  BP: 124/76 (08 Sep 2019 05:15) (120/71 - 151/71)  BP(mean): --  RR: 18 (08 Sep 2019 05:15) (18 - 18)  SpO2: 96% (08 Sep 2019 05:15) (96% - 97%)      PHYSICAL EXAM:  GENERAL: NAD  Eyes: Left eye conjunctival erythema + yellow pus on lids  LUNG: Clear to auscultation bilaterally; No wheezes rales or rhonchi  HEART: S1, S2 rrr no murmurs  ABDOMEN: Soft, Nontender, Nondistended, Bowel sounds present, + hardware palpated in RLQ  EXTREMITIES: No leg edema cyanosis or clubbing  NEUROLOGY: AAOx3, communicative, moves arms, lower extremity contracted  SKIN: warm and dry      LABS:                        7.1    7.33  )-----------( 327      ( 08 Sep 2019 08:45 )             23.3     09-08    145  |  108  |  18  ----------------------------<  101<H>  4.1   |  27  |  0.54    Ca    9.3      08 Sep 2019 06:50                Care Discussed with Consultants/Other Providers:  Hector lama

## 2019-09-08 NOTE — PROGRESS NOTE ADULT - PROBLEM SELECTOR PLAN 7
-Continue with multi-modal pain management: Tylenol mild-mod pain, Morphine 2 q4h PRN severe pain, Baclofen 20 mg TID, Duloxetine 20 mg BID, and Gabapentin 600 TID  -Patient has baclofen pump - due for refill 9/22  - chronic pain c/s

## 2019-09-08 NOTE — CONSULT NOTE ADULT - SUBJECTIVE AND OBJECTIVE BOX
[facility] DEPARTMENT OF OPHTHALMOLOGY - INITIAL ADULT CONSULT  -----------------------------------------------------------------------------  Jose J Sanchez MD PGY-2  Pager: [phone]/[facility]: [id number]  -----------------------------------------------------------------------------    HPI:  68F with PMHx of aortic dissection (post-repair several years prior), spinal cord hematoma (now functionally paraplegic, she can move her left leg), chronic spasticity and pain (on PRN Oxycodone and has Baclofen pump), HTN, nephrolithiasis s/p left urethral stent and endotheliitis/keratitis (post-corneal transplant) transferred from [facility] after suffering UGIB. Patient has complicated medical history and recent medical course not full reflected on chart review, but collateral obtained from . Patient was at home in early August 2019 when  though she was not mentating well. He took her vitals at that time and found her to have a systolic BP in the 70s and HR in the 150s. When he brought her to [facility] they found her Hg to be 4.4. Unknown if she was having melena or hematochezia at that time. While she was there patient underwent four EGDs and received more than 10 units PRBC. EGDs were unable to identify active source of bleeding and usually showed pooled blood. There is suspicion for dieulafoy, but unable to be acted upon at [place] with the multiple EGDs at times only showing adherent clot. She was evaluated by surgery who recommended ex-lap given the severity, but patient's family deferred for now. Patient spent a majority of her time in [place] at the ICU. CT angiogram could not identify active source of bleeding with IR evaluation stating they could not provide a solution given the patient's previous dissection and collateral arterial formation. Patient's PMD is Dr. Branham who recommended transfer to [facility] for evaluation by Dr. Ambrocio. When seen by me patient had no active complaints. She states her Baclofen pump is empty. She denies any hematochezia or melena. She is slightly fretful given the recent medical events and is hopeful that a solution can be found. (04 Sep 2019 20:17)    Interval History: poor historian, pt reports all of a sudden a couple of days ago patient began to have pain and discharge from the left eye, denies any eye trauma, scratching. Does state at baseline vision in left eye is a little blurry but unable to determine if vision currently worse or at baseline. Has a     PAST MEDICAL & SURGICAL HISTORY:  Dorsalgia of lumbar region: on pain medication /baclofen po and pump  Self-catheterizes urinary bladder  Anemia: chronic  Uveitis  Osteoporosis  PAD (peripheral artery disease)  Hematoma: spinal  September  treated  September 2018  Paraplegia: on wheelchair goes to physical therapy 2 x weekly  Aortic dissection, thoracic: Type A Repaired 2009  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2018  UTI (urinary tract infection): stable x 3 months  TIA (transient ischemic attack)  HTN (Hypertension): on meds  History of corneal transplant: left corneal transplant on 5/21/2018  Disorder of spine: unthetethering 2 x  Presence of IVC filter: 2014 ?  S/P aortic dissection repair: Type A Dissection repair /2009   descending aortic aneurysm repair 9/2016  H/O Spinal surgery: laminectomies 2014    Past Ocular History: ***    FAMILY HISTORY:  Family history of Alzheimer's disease    Social History: ***    Ophthalmic Medications: ***    MEDICATIONS  (STANDING):  artificial  tears Solution 1 Drop(s) Both EYES daily  ascorbic acid 500 milliGRAM(s) Oral daily  atorvastatin 40 milliGRAM(s) Oral at bedtime  baclofen 20 milliGRAM(s) Oral four times a day  DULoxetine 20 milliGRAM(s) Oral two times a day  gabapentin 600 milliGRAM(s) Oral three times a day  influenza   Vaccine 0.5 milliLiter(s) IntraMuscular once  lidocaine   Patch 1 Patch Transdermal daily  multivitamin 1 Tablet(s) Oral daily  ofloxacin 0.3% Solution 2 Drop(s) Left EYE two times a day  pantoprazole  Injectable 40 milliGRAM(s) IV Push two times a day  prednisoLONE acetate 1% Suspension 1 Drop(s) Left EYE daily  valACYclovir 1000 milliGRAM(s) Oral daily    MEDICATIONS  (PRN):  acetaminophen   Tablet .. 650 milliGRAM(s) Oral every 6 hours PRN Mild Pain (1 - 3), Moderate Pain (4 - 6)  morphine  - Injectable 2 milliGRAM(s) IV Push every 4 hours PRN Severe Pain (7 - 10)  ondansetron Injectable 4 milliGRAM(s) IV Push every 6 hours PRN Nausea and/or Vomiting  polyethylene glycol 3350 17 Gram(s) Oral every 12 hours PRN Constipation  sodium chloride 2% Ophthalmic Solution 1 Drop(s) Left EYE four times a day PRN dryness  zolpidem 5 milliGRAM(s) Oral at bedtime PRN Insomnia    Allergies & Intolerances:     Review of Systems:  Constitutional: No fever, chills  Eyes: No blurry vision, flashes, floaters, FBS, erythema, discharge, double vision, OU  Neuro: No tremors  Cardiovascular: No chest pain, palpitations  Respiratory: No SOB, no cough  GI: No nausea, vomiting, abdominal pain  : No dysuria  Skin: no rash  Psych: no depression  Endocrine: no polyuria, polydipsia  Heme/lymph: no swelling    VITALS: T(C): 36.4 (09-08-19 @ 05:15)  T(F): 97.5 (09-08-19 @ 05:15), Max: 98.4 (09-07-19 @ 12:17)  HR: 96 (09-08-19 @ 05:15) (87 - 110)  BP: 124/76 (09-08-19 @ 05:15) (120/71 - 151/71)  RR:  (18 - 18)  SpO2:  (96% - 97%)  Wt(kg): --  General: AAO x 2, appropriate mood and affect    Ophthalmology Exam:  Visual acuity (cc): 20/50 ph 20/25 OD, 20/HM OS  Pupils: PERRL OU, no APD  Ttono: 13 OU  Extraocular movements (EOMs): Full OU, no pain, no diplopia  Confrontational Visual Field (CVF): Full OD, OS limited 2/2 VA  Color Plates: 12/12 OD, OS limited by VA    Pen Light Exam (PLE)  External: Flat OU  Lids/Lashes/Lacrimal Ducts: Flat OU    Sclera/Conjunctiva: W+Q OU  Cornea: Cl OU  Anterior Chamber: D+F OU    Iris: Flat OU  Lens: Cl OU    Fundus Exam: dilated with 1% tropicamide and 2.5% phenylephrine  Approval obtained from primary team for dilation  Patient aware that pupils can remained dilated for at least 4-6 hours  Exam performed with 20D lens    Vitreous: wnl OU  Disc, cup/disc: sharp and pink, 0.4 OU  Macula: wnl OU  Vessels: wnl OU  Periphery: wnl OU    Labs/Imaging:  *** [facility] DEPARTMENT OF OPHTHALMOLOGY - INITIAL ADULT CONSULT  -----------------------------------------------------------------------------  Jose J Sanchez MD PGY-2  Pager: [phone]/[facility]: [id number]  -----------------------------------------------------------------------------    HPI:  68F with PMHx of aortic dissection (post-repair several years prior), spinal cord hematoma (now functionally paraplegic, she can move her left leg), chronic spasticity and pain (on PRN Oxycodone and has Baclofen pump), HTN, nephrolithiasis s/p left urethral stent and endotheliitis/keratitis (post-corneal transplant) transferred from [facility] after suffering UGIB. Patient has complicated medical history and recent medical course not full reflected on chart review, but collateral obtained from . Patient was at home in early August 2019 when  though she was not mentating well. He took her vitals at that time and found her to have a systolic BP in the 70s and HR in the 150s. When he brought her to [facility] they found her Hg to be 4.4. Unknown if she was having melena or hematochezia at that time. While she was there patient underwent four EGDs and received more than 10 units PRBC. EGDs were unable to identify active source of bleeding and usually showed pooled blood. There is suspicion for dieulafoy, but unable to be acted upon at [place] with the multiple EGDs at times only showing adherent clot. She was evaluated by surgery who recommended ex-lap given the severity, but patient's family deferred for now. Patient spent a majority of her time in [place] at the ICU. CT angiogram could not identify active source of bleeding with IR evaluation stating they could not provide a solution given the patient's previous dissection and collateral arterial formation. Patient's PMD is Dr. Branham who recommended transfer to [facility] for evaluation by Dr. Ambrocio. When seen by me patient had no active complaints. She states her Baclofen pump is empty. She denies any hematochezia or melena. She is slightly fretful given the recent medical events and is hopeful that a solution can be found. (04 Sep 2019 20:17)    Interval History: poor historian, pt reports all of a sudden a couple of days ago patient began to have pain and discharge from the left eye, denies any eye trauma, scratching. Does state at baseline vision in left eye is a little blurry but unable to determine if vision currently worse or at baseline. Per allscripts, patient last saw Dr. 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     PAST MEDICAL & SURGICAL HISTORY:  Dorsalgia of lumbar region: on pain medication /baclofen po and pump  Self-catheterizes urinary bladder  Anemia: chronic  Uveitis  Osteoporosis  PAD (peripheral artery disease)  Hematoma: spinal  September  treated  September 2018  Paraplegia: on wheelchair goes to physical therapy 2 x weekly  Aortic dissection, thoracic: Type A Repaired 2009  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2018  UTI (urinary tract infection): stable x 3 months  TIA (transient ischemic attack)  HTN (Hypertension): on meds  History of corneal transplant: left corneal transplant on 5/21/2018  Disorder of spine: unthetethering 2 x  Presence of IVC filter: 2014 ?  S/P aortic dissection repair: Type A Dissection repair /2009   descending aortic aneurysm repair 9/2016  H/O Spinal surgery: laminectomies 2014    Past Ocular History: HSV OS, PK OS    FAMILY HISTORY:  Family history of Alzheimer's disease    Social History: none    Ophthalmic Medications: pred forte QID OS, valtrex 1g QD     MEDICATIONS  (STANDING):  artificial  tears Solution 1 Drop(s) Both EYES daily  ascorbic acid 500 milliGRAM(s) Oral daily  atorvastatin 40 milliGRAM(s) Oral at bedtime  baclofen 20 milliGRAM(s) Oral four times a day  DULoxetine 20 milliGRAM(s) Oral two times a day  gabapentin 600 milliGRAM(s) Oral three times a day  influenza   Vaccine 0.5 milliLiter(s) IntraMuscular once  lidocaine   Patch 1 Patch Transdermal daily  multivitamin 1 Tablet(s) Oral daily  ofloxacin 0.3% Solution 2 Drop(s) Left EYE two times a day  pantoprazole  Injectable 40 milliGRAM(s) IV Push two times a day  prednisoLONE acetate 1% Suspension 1 Drop(s) Left EYE daily  valACYclovir 1000 milliGRAM(s) Oral daily    MEDICATIONS  (PRN):  acetaminophen   Tablet .. 650 milliGRAM(s) Oral every 6 hours PRN Mild Pain (1 - 3), Moderate Pain (4 - 6)  morphine  - Injectable 2 milliGRAM(s) IV Push every 4 hours PRN Severe Pain (7 - 10)  ondansetron Injectable 4 milliGRAM(s) IV Push every 6 hours PRN Nausea and/or Vomiting  polyethylene glycol 3350 17 Gram(s) Oral every 12 hours PRN Constipation  sodium chloride 2% Ophthalmic Solution 1 Drop(s) Left EYE four times a day PRN dryness  zolpidem 5 milliGRAM(s) Oral at bedtime PRN Insomnia    Allergies & Intolerances: NKDA    Review of Systems:  Constitutional: No fever, chills  Eyes: No blurry vision, flashes, floaters, FBS, erythema, discharge, double vision, OU  Neuro: No tremors  Cardiovascular: No chest pain, palpitations  Respiratory: No SOB, no cough  GI: No nausea, vomiting, abdominal pain  : No dysuria  Skin: no rash  Psych: no depression  Endocrine: no polyuria, polydipsia  Heme/lymph: no swelling    VITALS: T(C): 36.4 (09-08-19 @ 05:15)  T(F): 97.5 (09-08-19 @ 05:15), Max: 98.4 (09-07-19 @ 12:17)  HR: 96 (09-08-19 @ 05:15) (87 - 110)  BP: 124/76 (09-08-19 @ 05:15) (120/71 - 151/71)  RR:  (18 - 18)  SpO2:  (96% - 97%)  Wt(kg): --  General: AAO x 2, appropriate mood and affect    Ophthalmology Exam:  Visual acuity (cc): 20/50 ph 20/25 OD, 20/HM OS  Pupils: PERRL OD, OS surgical  Ttono: 13 OU  Extraocular movements (EOMs): Full OU, no pain, no diplopia  Confrontational Visual Field (CVF): Full OD, OS limited 2/2 VA  Color Plates: 12/12 OD, OS limited by VA    Slit lamp Exam (SLE)  External: Flat OU  Lids/Lashes/Lacrimal Ducts: Flat OU, some mucoid discharge OS  Sclera/Conjunctiva: W+Q OD, mild injection OS  Cornea: PK OS, stitches buried, 1.75mm epidefect at 6 o'clock, no FB seen  Anterior Chamber: D+Q OU, no cell/flare seen  Iris: Flat OU  Lens: 2+ NS OD, dense cataract OS    Fundus Exam: dilated with 1% tropicamide and 2.5% phenylephrine  Approval obtained from primary team for dilation  Patient aware that pupils can remained dilated for at least 4-6 hours  Exam performed with 20D lens    Vitreous: wnl OU  Disc, cup/disc: sharp and pink, 0.4 OU  Macula: wnl OU  Vessels: wnl OU  Periphery: wnl OU [facility] DEPARTMENT OF OPHTHALMOLOGY - INITIAL ADULT CONSULT  -----------------------------------------------------------------------------  Jose J Sanchez MD PGY-2  Pager: [phone]/[facility]: [id number]  -----------------------------------------------------------------------------    HPI:  68F with PMHx of aortic dissection (post-repair several years prior), spinal cord hematoma (now functionally paraplegic, she can move her left leg), chronic spasticity and pain (on PRN Oxycodone and has Baclofen pump), HTN, nephrolithiasis s/p left urethral stent and endotheliitis/keratitis (post-corneal transplant) transferred from [facility] after suffering UGIB. Patient has complicated medical history and recent medical course not full reflected on chart review, but collateral obtained from . Patient was at home in early August 2019 when  though she was not mentating well. He took her vitals at that time and found her to have a systolic BP in the 70s and HR in the 150s. When he brought her to [facility] they found her Hg to be 4.4. Unknown if she was having melena or hematochezia at that time. While she was there patient underwent four EGDs and received more than 10 units PRBC. EGDs were unable to identify active source of bleeding and usually showed pooled blood. There is suspicion for dieulafoy, but unable to be acted upon at [place] with the multiple EGDs at times only showing adherent clot. She was evaluated by surgery who recommended ex-lap given the severity, but patient's family deferred for now. Patient spent a majority of her time in [place] at the ICU. CT angiogram could not identify active source of bleeding with IR evaluation stating they could not provide a solution given the patient's previous dissection and collateral arterial formation. Patient's PMD is Dr. Branham who recommended transfer to [facility] for evaluation by Dr. Ambrocio. When seen by me patient had no active complaints. She states her Baclofen pump is empty. She denies any hematochezia or melena. She is slightly fretful given the recent medical events and is hopeful that a solution can be found. (04 Sep 2019 20:17)    Interval History: poor historian, pt reports all of a sudden a couple of days ago patient began to have pain and discharge from the left eye, denies any eye trauma, scratching. Does state at baseline vision in left eye is a little blurry but unable to determine if vision currently worse or at baseline. Per allscripts, patient last saw Dr. 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     PAST MEDICAL & SURGICAL HISTORY:  Dorsalgia of lumbar region: on pain medication /baclofen po and pump  Self-catheterizes urinary bladder  Anemia: chronic  Uveitis  Osteoporosis  PAD (peripheral artery disease)  Hematoma: spinal  September  treated  September 2018  Paraplegia: on wheelchair goes to physical therapy 2 x weekly  Aortic dissection, thoracic: Type A Repaired 2009  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2018  UTI (urinary tract infection): stable x 3 months  TIA (transient ischemic attack)  HTN (Hypertension): on meds  History of corneal transplant: left corneal transplant on 5/21/2018  Disorder of spine: unthetethering 2 x  Presence of IVC filter: 2014 ?  S/P aortic dissection repair: Type A Dissection repair /2009   descending aortic aneurysm repair 9/2016  H/O Spinal surgery: laminectomies 2014    Past Ocular History: HSV OS, PK OS    FAMILY HISTORY:  Family history of Alzheimer's disease    Social History: none    Ophthalmic Medications: pred forte QID OS, valtrex 1g QD     MEDICATIONS  (STANDING):  artificial  tears Solution 1 Drop(s) Both EYES daily  ascorbic acid 500 milliGRAM(s) Oral daily  atorvastatin 40 milliGRAM(s) Oral at bedtime  baclofen 20 milliGRAM(s) Oral four times a day  DULoxetine 20 milliGRAM(s) Oral two times a day  gabapentin 600 milliGRAM(s) Oral three times a day  influenza   Vaccine 0.5 milliLiter(s) IntraMuscular once  lidocaine   Patch 1 Patch Transdermal daily  multivitamin 1 Tablet(s) Oral daily  ofloxacin 0.3% Solution 2 Drop(s) Left EYE two times a day  pantoprazole  Injectable 40 milliGRAM(s) IV Push two times a day  prednisoLONE acetate 1% Suspension 1 Drop(s) Left EYE daily  valACYclovir 1000 milliGRAM(s) Oral daily    MEDICATIONS  (PRN):  acetaminophen   Tablet .. 650 milliGRAM(s) Oral every 6 hours PRN Mild Pain (1 - 3), Moderate Pain (4 - 6)  morphine  - Injectable 2 milliGRAM(s) IV Push every 4 hours PRN Severe Pain (7 - 10)  ondansetron Injectable 4 milliGRAM(s) IV Push every 6 hours PRN Nausea and/or Vomiting  polyethylene glycol 3350 17 Gram(s) Oral every 12 hours PRN Constipation  sodium chloride 2% Ophthalmic Solution 1 Drop(s) Left EYE four times a day PRN dryness  zolpidem 5 milliGRAM(s) Oral at bedtime PRN Insomnia    Allergies & Intolerances: NKDA    Review of Systems:  Constitutional: No fever, chills  Eyes: No blurry vision, flashes, floaters, FBS, erythema, discharge, double vision, OU  Neuro: No tremors  Cardiovascular: No chest pain, palpitations  Respiratory: No SOB, no cough  GI: No nausea, vomiting, abdominal pain  : No dysuria  Skin: no rash  Psych: no depression  Endocrine: no polyuria, polydipsia  Heme/lymph: no swelling    VITALS: T(C): 36.4 (09-08-19 @ 05:15)  T(F): 97.5 (09-08-19 @ 05:15), Max: 98.4 (09-07-19 @ 12:17)  HR: 96 (09-08-19 @ 05:15) (87 - 110)  BP: 124/76 (09-08-19 @ 05:15) (120/71 - 151/71)  RR:  (18 - 18)  SpO2:  (96% - 97%)  Wt(kg): --  General: AAO x 2, appropriate mood and affect    Ophthalmology Exam:  Visual acuity (cc): 20/50 ph 20/25 OD, 20/HM OS  Pupils: PERRL OD, OS surgical  Ttono: 13 OU  Extraocular movements (EOMs): Full OU, no pain, no diplopia  Confrontational Visual Field (CVF): Full OD, OS limited 2/2 VA  Color Plates: 12/12 OD, OS limited by VA    Slit lamp Exam (SLE)  External: Flat OU  Lids/Lashes/Lacrimal Ducts: Flat OU, some mucoid discharge OS  Sclera/Conjunctiva: W+Q OD, mild injection OS  Cornea: PK OS, stitches buried, 1.75mm epidefect at 6 o'clock, no FB seen  Anterior Chamber: D+Q OU, no cell/flare seen  Iris: Flat OU  Lens: 2+ NS OD, dense cataract OS    Fundus Exam: dilated with 1% tropicamide and 2.5% phenylephrine  Approval obtained from primary team for dilation  Patient aware that pupils can remained dilated for at least 4-6 hours  Exam performed with 20D lens    Vitreous: wnl OD  Disc, cup/disc: sharp and pink, 0.3 OD  Macula: wnl OD  Vessels: wnl OD  Periphery: wnl OD    No view OS 2/2 cataract  B-scan done 9/8/19: no RT, RD, or mass seen OS

## 2019-09-08 NOTE — PROGRESS NOTE ADULT - ASSESSMENT
A/P: 68 year old woman with recent gi bleed transferred from [facility] and is stable although with slowly downtrending hemoglobin and melena yesterday  no overt blood loss noted and hemodynamically stable  will make NPO overnight  if hgb continues to trend down would repeat egd tomorrow. if hgb improves will likely defer repeat egd.    if hgb trends down further would also give 1 unit of prbc  discussed with patient and   call with questions  [phone]

## 2019-09-08 NOTE — PROGRESS NOTE ADULT - PROBLEM SELECTOR PLAN 1
-There was concern for questionable Dieulafoy lesion, most recent EGD on 8/30 revealed pooled blood in gastric fundus and adherent clot that could not be removed. CTA a/p could not localize bleed and per IR at OSH, celiac axis too tortuous for mesenteric angiogram. patient was also evaluated by surgery and there was discussion about possibility of exlap with gastrotomy which was deferred at this time.   -s/p EGD by Dr. Ambrocio on 9/5 which showed acute gastritis, gastric polyps with no source of GI bleed identified  -PPI IV BID  -Monitor H/H . downtrending + melena  - NPO for now, GI follow ip

## 2019-09-08 NOTE — PROGRESS NOTE ADULT - ASSESSMENT
67 y/o F with PMHx of aortic dissection (post-repair several years prior), spinal cord hematoma (now functionally paraplegic), chronic spasticity and pain (on Oxycodone and Baclofen pump), HTN, nephrolithiasis s/p left urethral stent, UTIs and endotheliitis/keratitis (post-corneal transplant). Patient had recent hospitalizations at [facility] for UGIB with acute blood loss anemia requiring multiple PRBC transfusions. Work up there included CTA a/p which was negative and unable to localize source of bleed, s/p multiple EGD which showed pooled blood and/or adherent clot in gastric fundus that could not be removed. Patient transferred here for further management and evaluation by Dr. Ambrocio. Patient is s/p repeat EGD on 9/5 that showed acute gastritis, gastric polyps with no source of GI bleed identified.

## 2019-09-08 NOTE — PROGRESS NOTE ADULT - SUBJECTIVE AND OBJECTIVE BOX
INTERVAL HPI/OVERNIGHT EVENTS:  covering for Dr. Ambrocio  had one melenic BM yesterday. hgb slowly trending down currently at 7.1. Patient endorses chronic back pain    MEDICATIONS  (STANDING):  artificial  tears Solution 1 Drop(s) Both EYES daily  ascorbic acid 500 milliGRAM(s) Oral daily  atorvastatin 40 milliGRAM(s) Oral at bedtime  baclofen 20 milliGRAM(s) Oral four times a day  DULoxetine 20 milliGRAM(s) Oral two times a day  erythromycin   Ointment 1 Application(s) Left EYE two times a day  gabapentin 600 milliGRAM(s) Oral three times a day  influenza   Vaccine 0.5 milliLiter(s) IntraMuscular once  lidocaine   Patch 1 Patch Transdermal daily  multivitamin 1 Tablet(s) Oral daily  ofloxacin 0.3% Solution 2 Drop(s) Left EYE four times a day  pantoprazole  Injectable 40 milliGRAM(s) IV Push two times a day  prednisoLONE acetate 1% Suspension 1 Drop(s) Left EYE daily  valACYclovir 1000 milliGRAM(s) Oral three times a day    MEDICATIONS  (PRN):  acetaminophen   Tablet .. 650 milliGRAM(s) Oral every 6 hours PRN Mild Pain (1 - 3), Moderate Pain (4 - 6)  morphine  - Injectable 2 milliGRAM(s) IV Push every 4 hours PRN Severe Pain (7 - 10)  ondansetron Injectable 4 milliGRAM(s) IV Push every 6 hours PRN Nausea and/or Vomiting  polyethylene glycol 3350 17 Gram(s) Oral every 12 hours PRN Constipation  sodium chloride 2% Ophthalmic Solution 1 Drop(s) Left EYE four times a day PRN dryness  zolpidem 5 milliGRAM(s) Oral at bedtime PRN Insomnia      Allergies    No Known Allergies    Intolerances        Vital Signs Last 24 Hrs  T(C): 36.9 (08 Sep 2019 14:14), Max: 36.9 (08 Sep 2019 14:14)  T(F): 98.4 (08 Sep 2019 14:14), Max: 98.4 (08 Sep 2019 14:14)  HR: 101 (08 Sep 2019 14:14) (96 - 101)  BP: 122/69 (08 Sep 2019 14:14) (122/69 - 130/75)  BP(mean): --  RR: 18 (08 Sep 2019 14:14) (18 - 18)  SpO2: 97% (08 Sep 2019 14:14) (96% - 97%)    PHYSICAL EXAM:  General: comfortable in No acute distress  CV: regular rate and rhythm. no murmurs rubs or gallops  Lungs: clear to ascultation bilaterally  abdomen: soft nontender nondistened normal bowel sounds  ext: negative edema. tremor noted  skin: no rashes noted          LABS:                        7.1    7.33  )-----------( 327      ( 08 Sep 2019 08:45 )             23.3     09-08    145  |  108  |  18  ----------------------------<  101<H>  4.1   |  27  |  0.54    Ca    9.3      08 Sep 2019 06:50          LIVER FUNCTIONS - ( 05 Sep 2019 06:43 )  Alb: 3.0 g/dL / Pro: 5.5 g/dL / ALK PHOS: 87 U/L / ALT: 7 U/L / AST: 10 U/L / GGT: x             RADIOLOGY & ADDITIONAL TESTS:

## 2019-09-08 NOTE — PROVIDER CONTACT NOTE (CRITICAL VALUE NOTIFICATION) - BACKGROUND
Patient is a 68 year old female admitted with GI Bleed. PMH includes anemia, hematoma, HTN, UTI, paraplegia.

## 2019-09-08 NOTE — CONSULT NOTE ADULT - ASSESSMENT
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 No dendrite seen but ?defect healing dendrite vs.     Outpatient follow-up: Patient should follow-up with his/her ophthalmologist or with [facility] Department of Ophthalmology within 1 week of after discharge at:    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 No dendrite seen but ?defect represents healing dendrite vs. corneal abrasion 2/2 trauma (eye rubbing). Doesn't appear to have signs of acute graft rejection. B-scan without detachment or mass OS.   - increase ofloxacin eye drops to 1 drop 4 times per day to the left eye (outpatient documents allergy to ciprofloxacin, checked with patient and  over the phone who denies any allergies)  - start erythromycin ointment two times per day to left eye  - increase valtrex from 1g daily to 1g TID PO  - will follow closely  - case SDW Dr. Ramos, DW Dr. Tate    Outpatient follow-up: Patient should follow-up with his/her ophthalmologist or with [facility] Department of Ophthalmology within 1 week of after discharge at:    [address]  [place] [address]  [phone]    Jose J Sanchez MD, PGY-2  Pager: [phone]/[facility]: [phone]

## 2019-09-09 NOTE — PROVIDER CONTACT NOTE (OTHER) - ASSESSMENT
Patient is alert and oriented x4, no acute distress noted. QZ=091/85, PA aware. other VS stable. pt denies any vision changes, denies any numbness or tingling. No slurring of words. Neuro check performed, patient able to follow commands. Patient is alert and oriented x3, no acute distress noted. SC=851/85, PA aware. other VS stable. pt denies any vision changes, denies any numbness or tingling. No slurring of words. Neuro check performed, patient able to follow commands.

## 2019-09-09 NOTE — PROGRESS NOTE ADULT - SUBJECTIVE AND OBJECTIVE BOX
Patient is a 68y old  Female who presents with a chief complaint of Transfer from [facility] for UGIB (09 Sep 2019 10:04)      SUBJECTIVE / OVERNIGHT EVENTS:    patient seen and examined at bedside. felt her L hand shaking. as per  this always happens when she has a UTI. also tachycardic o/n ekg unchanged from prior.     ROS:  14 point ROS negative in detail except stated as above    MEDICATIONS  (STANDING):  artificial  tears Solution 1 Drop(s) Both EYES daily  ascorbic acid 500 milliGRAM(s) Oral daily  atorvastatin 40 milliGRAM(s) Oral at bedtime  baclofen 20 milliGRAM(s) Oral four times a day  DULoxetine 20 milliGRAM(s) Oral two times a day  erythromycin   Ointment 1 Application(s) Left EYE two times a day  gabapentin 600 milliGRAM(s) Oral three times a day  influenza   Vaccine 0.5 milliLiter(s) IntraMuscular once  lidocaine   Patch 1 Patch Transdermal daily  multivitamin 1 Tablet(s) Oral daily  ofloxacin 0.3% Solution 2 Drop(s) Left EYE four times a day  pantoprazole  Injectable 40 milliGRAM(s) IV Push two times a day  prednisoLONE acetate 1% Suspension 1 Drop(s) Left EYE daily  valACYclovir 1000 milliGRAM(s) Oral three times a day    MEDICATIONS  (PRN):  acetaminophen   Tablet .. 650 milliGRAM(s) Oral every 6 hours PRN Mild Pain (1 - 3), Moderate Pain (4 - 6)  morphine  - Injectable 2 milliGRAM(s) IV Push every 4 hours PRN Severe Pain (7 - 10)  ondansetron Injectable 4 milliGRAM(s) IV Push every 6 hours PRN Nausea and/or Vomiting  polyethylene glycol 3350 17 Gram(s) Oral every 12 hours PRN Constipation  sodium chloride 2% Ophthalmic Solution 1 Drop(s) Left EYE four times a day PRN dryness  zolpidem 5 milliGRAM(s) Oral at bedtime PRN Insomnia      CAPILLARY BLOOD GLUCOSE        I&O's Summary    08 Sep 2019 07:01  -  09 Sep 2019 07:00  --------------------------------------------------------  IN: 200 mL / OUT: 1550 mL / NET: -1350 mL        PHYSICAL EXAM:  Vital Signs Last 24 Hrs  T(C): 36.7 (09 Sep 2019 04:54), Max: 37.2 (08 Sep 2019 20:51)  T(F): 98.1 (09 Sep 2019 04:54), Max: 99 (08 Sep 2019 20:51)  HR: 101 (09 Sep 2019 04:54) (101 - 104)  BP: 155/85 (09 Sep 2019 04:54) (122/69 - 155/85)  BP(mean): --  RR: 18 (09 Sep 2019 04:54) (18 - 18)  SpO2: 95% (09 Sep 2019 04:54) (95% - 97%)      PHYSICAL EXAM:  GENERAL: NAD  Eyes: Left eye conjunctival erythema   LUNG: Clear to auscultation bilaterally; No wheezes rales or rhonchi  HEART: S1, S2 rrr no murmurs  ABDOMEN: Soft, Nontender, Nondistended, Bowel sounds present, + hardware palpated in RLQ  EXTREMITIES: No leg edema cyanosis or clubbing  NEUROLOGY: AAOx3, communicative, moves arms, lower extremity contracted  UE strength 4/5 sensation intact to light touch  SKIN: warm and dry    LABS:                        7.3    10.52 )-----------( 294      ( 09 Sep 2019 10:16 )             23.6     09-09    141  |  105  |  25<H>  ----------------------------<  116<H>  3.8   |  25  |  0.53    Ca    8.7      09 Sep 2019 07:07                RADIOLOGY & ADDITIONAL TESTS:    Imaging Personally Reviewed:    Consultant(s) Notes Reviewed:      Care Discussed with Consultants/Other Providers:

## 2019-09-09 NOTE — PROGRESS NOTE ADULT - PROBLEM SELECTOR PLAN 1
-with acute blood loss anemia  -There was concern for questionable Dieulafoy lesion, most recent EGD on 8/30 revealed pooled blood in gastric fundus and adherent clot that could not be removed. CTA a/p could not localize bleed and per IR at OSH, celiac axis too tortuous for mesenteric angiogram. patient was also evaluated by surgery and there was discussion about possibility of exlap with gastrotomy which was deferred at this time.   -s/p EGD by Dr. Ambrocio on 9/5 which showed acute gastritis, gastric polyps with no source of GI bleed identified  -PPI IV BID  -Monitor H/H . downtrending + melena  - NPO for now, endoscopy, cbc q8hr

## 2019-09-09 NOTE — PROGRESS NOTE ADULT - SUBJECTIVE AND OBJECTIVE BOX
S: Pt seen and examined at bedside. Transport at bedside to go to EGD and unable to postpone for eye exam. Limited exam today and will return tomorrow. Pt c/o left eye discomfort. Has been rubbing eye per family    General: AAO x 2, appropriate mood and affect    Ophthalmology Exam:  Pupils: PERRL OD, OS surgical  Ttono: 14 OS   Extraocular movements (EOMs): Full OU, no pain, no diplopia    (PLE) Slit lamp at bedside: Unable to perform slit lamp 2/2 time constraints  External: Flat OU  Lids/Lashes/Lacrimal Ducts: Flat OD, Trace to 1+ edema/erythema PAMELA  Sclera/Conjunctiva: W+Q OD, mild injection OS  Cornea: PK OS, stitches buried, no epidefect appreciated   Anterior Chamber: D+F OU  Iris: Flat OU  Lens: 2+ NS OD, dense cataract OS    No view OS 2/2 cataract  B-scan done 9/8/19: no RT, RD, or mass seen OS    Assessment and Recommendations:  68y female w/ 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 No dendrite seen but ?defect represents healing dendrite vs. corneal abrasion 2/2 trauma (eye rubbing). Doesn't appear to have signs of acute graft rejection. B-scan without detachment or mass OS.   - ofloxacin eye drops 1 drop 4 times per day to the left eye (outpatient documents allergy to ciprofloxacin, checked with patient and  over the phone who denies any allergies)  - erythromycin ointment two times per day to left eye  - valtrex 1g TID PO  - will follow closely    Outpatient follow-up: Patient should follow-up with his/her ophthalmologist or with [facility] Department of Ophthalmology within 1 week of after discharge at:    [address]  [place] [address]  [phone] S: Pt seen and examined at bedside. Transport at bedside to go to EGD and unable to postpone transport for eye exam. Limited exam today and will return tomorrow. Pt c/o left eye discomfort. Has been rubbing eye per family    General: AAO x 2, appropriate mood and affect    Ophthalmology Exam:  Pupils: PERRL OD, OS surgical  Ttono: 14 OS   Extraocular movements (EOMs): Full OU, no pain, no diplopia    (PLE) Slit lamp at bedside: Unable to perform slit lamp 2/2 time constraints  External: Flat OU  Lids/Lashes/Lacrimal Ducts: Flat OD, Trace to 1+ edema/erythema PAMELA  Sclera/Conjunctiva: W+Q OD, trace injection OS  Cornea: PK OS, stitches buried, no epi defect appreciated   Anterior Chamber: D+F OU  Iris: Flat OU  Lens: 2+ NS OD, cortical cataract OS    No view OS 2/2 cataract  B-scan done 9/8/19: no RT, RD, or mass seen OS    Assessment and Recommendations:  68y female w/ 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 No dendrite seen but ?defect represents healing dendrite vs. corneal abrasion 2/2 trauma (eye rubbing).  B-scan without detachment or mass OS.   - ofloxacin eye drops 1 drop 4 times per day to the left eye (outpatient documents allergy to ciprofloxacin, checked with patient and  over the phone who denies any allergies)  - erythromycin ointment two times per day to left eye  - valtrex 1g TID PO  - will follow closely, will re-attempt slit lamp exam tomorrow    Outpatient follow-up: Patient should follow-up with his/her ophthalmologist- pt see Dr. Blank regularly.  F/u with Dr Blank upon discharge.  [address]  [place], [place] [address] [phone]

## 2019-09-09 NOTE — PROGRESS NOTE ADULT - SUBJECTIVE AND OBJECTIVE BOX
Pre-Endoscopy Evaluation      Referring Physician:                                    Procedure:    Indication for Procedure:    Pertinent History:    Sedation by Anesthesia [ ]    PAST MEDICAL & SURGICAL HISTORY:  Dorsalgia of lumbar region: on pain medication /baclofen po and pump  Self-catheterizes urinary bladder  Anemia: chronic  Uveitis  Osteoporosis  PAD (peripheral artery disease)  Hematoma: spinal  September  treated  September 2018  Paraplegia: on wheelchair goes to physical therapy 2 x weekly  Aortic dissection, thoracic: Type A Repaired 2009  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2018  UTI (urinary tract infection): stable x 3 months  TIA (transient ischemic attack)  HTN (Hypertension): on meds  History of corneal transplant: left corneal transplant on 5/21/2018  Disorder of spine: unthetethering 2 x  Presence of IVC filter: 2014 ?  S/P aortic dissection repair: Type A Dissection repair /2009   descending aortic aneurysm repair 9/2016  H/O Spinal surgery: laminectomies 2014      PMH of Gastroparesis [ ]  Gastric Surgery [ ]  Gastric Outlet Obstruction [ ]    Allergies    No Known Allergies    Intolerances        Latex allergy: [ ] yes [ ] no    Medications:MEDICATIONS  (STANDING):  artificial  tears Solution 1 Drop(s) Both EYES daily  ascorbic acid 500 milliGRAM(s) Oral daily  atorvastatin 40 milliGRAM(s) Oral at bedtime  baclofen 20 milliGRAM(s) Oral four times a day  cefTRIAXone   IVPB      DULoxetine 20 milliGRAM(s) Oral two times a day  erythromycin   Ointment 1 Application(s) Left EYE two times a day  gabapentin 600 milliGRAM(s) Oral three times a day  influenza   Vaccine 0.5 milliLiter(s) IntraMuscular once  lidocaine   Patch 1 Patch Transdermal daily  multivitamin 1 Tablet(s) Oral daily  ofloxacin 0.3% Solution 2 Drop(s) Left EYE four times a day  pantoprazole  Injectable 40 milliGRAM(s) IV Push two times a day  prednisoLONE acetate 1% Suspension 1 Drop(s) Left EYE daily  valACYclovir 1000 milliGRAM(s) Oral three times a day    MEDICATIONS  (PRN):  acetaminophen   Tablet .. 650 milliGRAM(s) Oral every 6 hours PRN Mild Pain (1 - 3), Moderate Pain (4 - 6)  morphine  - Injectable 2 milliGRAM(s) IV Push every 4 hours PRN Severe Pain (7 - 10)  ondansetron Injectable 4 milliGRAM(s) IV Push every 6 hours PRN Nausea and/or Vomiting  polyethylene glycol 3350 17 Gram(s) Oral every 12 hours PRN Constipation  sodium chloride 2% Ophthalmic Solution 1 Drop(s) Left EYE four times a day PRN dryness  zolpidem 5 milliGRAM(s) Oral at bedtime PRN Insomnia      Smoking: [ ] yes  [ ] no    AICD/PPM: [ ] yes   [ ] no    Pertinent lab data:                        7.3    10.52 )-----------( 294      ( 09 Sep 2019 10:16 )             23.6     09-09    141  |  105  |  25<H>  ----------------------------<  116<H>  3.8   |  25  |  0.53    Ca    8.7      09 Sep 2019 07:07                    Physical Examination:  Daily     Daily   Vital Signs Last 24 Hrs  T(C): 36.4 (09 Sep 2019 14:47), Max: 37.2 (08 Sep 2019 20:51)  T(F): 97.6 (09 Sep 2019 14:47), Max: 99 (08 Sep 2019 20:51)  HR: 80 (09 Sep 2019 14:47) (80 - 104)  BP: 153/77 (09 Sep 2019 14:47) (133/79 - 155/85)  BP(mean): --  RR: 18 (09 Sep 2019 14:47) (18 - 18)  SpO2: 100% (09 Sep 2019 14:47) (95% - 100%)    BP:                 HR:                  SPO2:               Temperature:    Constitutional: NAD    HEENT: PERRLA, EOMI,       Neck:  No JVD    Respiratory: CTAB/L    Cardiovascular: S1 and S2    Gastrointestinal: BS+, soft, NT/ND    Extremities: No peripheral edema    Neurological: A/O x 3, no focal deficits    Psychiatric: Normal mood, normal affect    : No Russo    Skin: No rashes    Comments:    ASA Class: I [ ]  II [ ]  III [ ]  IV [ ]    The patient is a suitable candidate for the planned procedure unless box checked [ ]  No, explain: Pre-Endoscopy Evaluation      Referring Physician:  dr. carlota piper                                 Procedure:  upper gastrointestinal endoscopy     Indication for Procedure: GIB    Pertinent History: 68y female with PMHx of aortic dissection s/p repair, spinal cord hematoma (now functionally paraplegic), chronic spasticity and pain (on Oxycodone and Baclofen pump), HTN, nephrolithiasis s/p left urethral stent, UTIs and endotheliitis/keratitis (post-corneal transplant). Patient had recent hospitalizations at [facility] for UGIB with acute blood loss anemia requiring multiple PRBC transfusions. Work up there included CTA a/p which was negative and unable to localize source of bleed, s/p multiple EGD which showed pooled blood and/or adherent clot in gastric fundus that could not be removed. Patient transferred here for further management and evaluation by Dr. Ambrocio. Patient is s/p repeat EGD on 9/5 that showed acute gastritis, gastric polyps with no source of GI bleed identified with continued melena and down trending hgb/hct      Sedation by Anesthesia [X]    PAST MEDICAL & SURGICAL HISTORY:  Dorsalgia of lumbar region: on pain medication /baclofen po and pump  Self-catheterizes urinary bladder  Anemia: chronic  Uveitis  Osteoporosis  PAD (peripheral artery disease)  Hematoma: spinal  September  treated  September 2018  Paraplegia: on wheelchair goes to physical therapy 2 x weekly  Aortic dissection, thoracic: Type A Repaired 2009  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2018  UTI (urinary tract infection): stable x 3 months  TIA (transient ischemic attack)  HTN (Hypertension): on meds  History of corneal transplant: left corneal transplant on 5/21/2018  Disorder of spine: unthetethering 2 x  Presence of IVC filter: 2014 ?  S/P aortic dissection repair: Type A Dissection repair /2009   descending aortic aneurysm repair 9/2016  H/O Spinal surgery: laminectomies 2014      PMH of Gastroparesis [ ]  Gastric Surgery [ ]  Gastric Outlet Obstruction [ ]    Allergies    No Known Allergies    Intolerances    Latex allergy: [ ] yes [x] no    Medications:MEDICATIONS  (STANDING):  artificial  tears Solution 1 Drop(s) Both EYES daily  ascorbic acid 500 milliGRAM(s) Oral daily  atorvastatin 40 milliGRAM(s) Oral at bedtime  baclofen 20 milliGRAM(s) Oral four times a day  cefTRIAXone   IVPB      DULoxetine 20 milliGRAM(s) Oral two times a day  erythromycin   Ointment 1 Application(s) Left EYE two times a day  gabapentin 600 milliGRAM(s) Oral three times a day  influenza   Vaccine 0.5 milliLiter(s) IntraMuscular once  lidocaine   Patch 1 Patch Transdermal daily  multivitamin 1 Tablet(s) Oral daily  ofloxacin 0.3% Solution 2 Drop(s) Left EYE four times a day  pantoprazole  Injectable 40 milliGRAM(s) IV Push two times a day  prednisoLONE acetate 1% Suspension 1 Drop(s) Left EYE daily  valACYclovir 1000 milliGRAM(s) Oral three times a day    MEDICATIONS  (PRN):  acetaminophen   Tablet .. 650 milliGRAM(s) Oral every 6 hours PRN Mild Pain (1 - 3), Moderate Pain (4 - 6)  morphine  - Injectable 2 milliGRAM(s) IV Push every 4 hours PRN Severe Pain (7 - 10)  ondansetron Injectable 4 milliGRAM(s) IV Push every 6 hours PRN Nausea and/or Vomiting  polyethylene glycol 3350 17 Gram(s) Oral every 12 hours PRN Constipation  sodium chloride 2% Ophthalmic Solution 1 Drop(s) Left EYE four times a day PRN dryness  zolpidem 5 milliGRAM(s) Oral at bedtime PRN Insomnia      Smoking: [ ] yes  [X] no    AICD/PPM: [ ] yes   [X] no    Pertinent lab data:                        7.3    10.52 )-----------( 294      ( 09 Sep 2019 10:16 )             23.6     09-09    141  |  105  |  25<H>  ----------------------------<  116<H>  3.8   |  25  |  0.53    Ca    8.7      09 Sep 2019 07:07      < from: Transthoracic Echocardiogram (08.19.19 @ 11:16) >       Summary     Fibrocalcific changes noted to the mitral valve leaflets with preserved   leaflet excursion.   EA reversal of the mitral inflow consistent with reduced compliance of   the   left ventricle.   Mild (1+) mitral regurgitation is present.   The aortic valve is not well visualized, appears mildly calcified. Valve   opening seems to be normal.   Mild (1+) aortic regurgitation is present.   Normal appearing tricuspid valve structure.   Mild to Moderate Tricuspid regurgitation is present.   Pulmonic valve not well seen.   The left atrium is moderately dilated.   Estimated left ventricular ejection fraction is 60-65 %.   The left ventricle is normal in size, wall thickness, wall motion and   contractility as seen in limited views.   The right atrium appears moderately dilated.   The right ventricle is mildly dilated.   Severe dilatation of the aortic root (4.6 cm).   Type A aortic dissection 5/2009 s/p repair,s/p descending aortic aneurysm   repair 09/2016.   IVC was notseen within the subcostal view.    ---------------------------------------------------      Physical Examination:    Daily   Vital Signs Last 24 Hrs  T(C): 36.4 (09 Sep 2019 14:47), Max: 37.2 (08 Sep 2019 20:51)  T(F): 97.6 (09 Sep 2019 14:47), Max: 99 (08 Sep 2019 20:51)  HR: 80 (09 Sep 2019 14:47) (80 - 104)  BP: 153/77 (09 Sep 2019 14:47) (133/79 - 155/85)  BP(mean): --  RR: 18 (09 Sep 2019 14:47) (18 - 18)  SpO2: 100% (09 Sep 2019 14:47) (95% - 100%)    Drug Dosing Weight  Height (cm): 170.2 (04 Sep 2019 19:28)  Weight (kg): 52 (29 Aug 2019 11:21)  BMI (kg/m2): 18 (04 Sep 2019 19:28)  BSA (m2): 1.6 (04 Sep 2019 19:28)    Constitutional: NAD     Neck:  No JVD    Respiratory: CTAB/L    Cardiovascular: S1 and S2    Gastrointestinal: BS+, soft, NT/ND    Extremities: No peripheral edema    Neurological: A/O x 3    : No Russo    Skin: No rashes    Comments:    The patient is a suitable candidate for the planned procedure unless box checked [ ]  No, explain:

## 2019-09-09 NOTE — PROGRESS NOTE ADULT - PROBLEM SELECTOR PLAN 6
-Possibly secondary to HSV or CMV, but  can only say "a virus" and now s/p corneal transplant  -Continue with Valtrex for viral suppression  -Continue with Prednisolone eye drop into left eye  -Daily artificial tears  -appreciate optho recs

## 2019-09-09 NOTE — CONSULT NOTE ADULT - ASSESSMENT
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 Admitted to [facility] for UGIB requiring many transfusions and diagnostic testing but source of bleed not determined.   Patient's PMD is Dr. Branham who recommended transfer to [facility] for evaluation by Dr. Ambrocio.     Discussed case with Dr. Vieira [phone], no concern for pump refill at this time. If patient still admitted by end of month, will need to call him to discuss plans for refill.  If patient is discharged before end of month she is to go straight to his office for refill (medication is already there).    Recommend:  Discontinue IV Morphine  Oxycodone 5 mg in morning and afternoon (home dose)  Oxycodone 10 mg evening (home dose)  Increase Gabapentin to 800 mg three times daily  Increase Duloxetine to 30 mg twice daily  Lidoderm patch to Right thigh daily, on for 12 hrs, off for 12 hrs    Time spent on encounter: 60 minutes        [facility]  [phone]

## 2019-09-09 NOTE — CHART NOTE - NSCHARTNOTEFT_GEN_A_CORE
CC: Left hand weakness     HPI: Asked by nurse to evaluate patient for left hand weakness. Patient seen and examined at the bedside. Patient was awake when she noticed her left hand was "trembling" and "dropping" when extended. Denies fevers/chills, weakness, diaphoresis, headaches, dizziness, syncope, chest pain, palpitations, dyspnea, abdominal pain, N/V/D.     Vital Signs Last 24 Hrs  T(C): 36.7 (09 Sep 2019 04:54), Max: 37.2 (08 Sep 2019 20:51)  T(F): 98.1 (09 Sep 2019 04:54), Max: 99 (08 Sep 2019 20:51)  HR: 101 (09 Sep 2019 04:54) (101 - 104)  BP: 155/85 (09 Sep 2019 04:54) (122/69 - 155/85)  RR: 18 (09 Sep 2019 04:54) (18 - 18)  SpO2: 95% (09 Sep 2019 04:54) (95% - 97%)    PHYSICAL EXAM:  General: NAD, A&Ox3  Respiratory: Lungs clear to auscultation bilaterally. No wheezes, rales, rhonchi. Normal respiratory effort.   Cardiovascular: S1, S2 present. Regular rate and rhythm. No murmurs, wheezes, or gallops  Gastrointestinal: BS x4 normoactive. Soft, non-tender, non-distended.   Extremities: 2+ peripheral pulses. No edema, cyanosis.    A/P      #  -Repeat vitals were stable  -Will continue to monitor  -Will endorse to day team      Nohemi Vieyra PA-C  # CC: Left hand weakness     HPI: Asked by nurse to evaluate patient for left hand weakness. Patient seen and examined at the bedside. Patient was awake when she noticed her left hand was "trembling" and "dropping" when extended. Patient states this left hand weakness is new for her, despite a history of chronic spasticity and pain being documented. Denies headaches, dizziness, syncope, visual changes, slurring of words, facial droop, numbness or tingling.  Denies chest pain, palpitations, dyspnea, abdominal pain, N/V/D.     Vital Signs Last 24 Hrs  T(C): 36.7 (09 Sep 2019 04:54), Max: 37.2 (08 Sep 2019 20:51)  T(F): 98.1 (09 Sep 2019 04:54), Max: 99 (08 Sep 2019 20:51)  HR: 101 (09 Sep 2019 04:54) (101 - 104)  BP: 155/85 (09 Sep 2019 04:54) (122/69 - 155/85)  RR: 18 (09 Sep 2019 04:54) (18 - 18)  SpO2: 95% (09 Sep 2019 04:54) (95% - 97%)    PHYSICAL EXAM:  General: NAD, A&Ox3  Neurologic: Mild left hand tremor noted. CN II-XII grossly intact. Negative pronator drift. Strength 5/5 bilateral upper extremities. Strength 0/5 on bilateral lower extremities due to patient being paraplegic  Respiratory: Lungs clear to auscultation bilaterally. No wheezes, rales, rhonchi. Normal respiratory effort.   Cardiovascular: S1, S2 present. Regular rate and rhythm. No murmurs, wheezes, or gallops  Gastrointestinal: BS x4 normoactive. Soft, non-tender, non-distended.   Extremities: 2+ peripheral pulses. No edema, cyanosis.    A/P  69 y/o F with PMHx of aortic dissection (post-repair several years prior), spinal cord hematoma (now functionally paraplegic), chronic spasticity and pain (on Oxycodone and Baclofen pump), HTN, nephrolithiasis s/p left urethral stent, UTIs and endotheliitis/keratitis (post-corneal transplant). Patient had recent hospitalizations at [facility] [place] for UGIB with acute blood loss anemia requiring multiple PRBC transfusions. Work up there included CTA a/p which was negative and unable to localize source of bleed, s/p multiple EGD which showed pooled blood and/or adherent clot in gastric fundus that could not be removed. Patient transferred here for further management and evaluation by Dr. Ambrocio. Patient is s/p repeat EGD on 9/5 that showed acute gastritis, gastric polyps with no source of GI bleed identified.  Now, patient with new onset left hand weakness   #Left hand weakness, ? resting tremors, r/o bleed   -Repeat vitals shows elevated manual BP of 156/72; will repeat BP in another hour  -F/u URGENT CT head  -Give morning meds as scheduled  -Consider neuro consult in the AM  -Will continue to monitor  -Will endorse to day team      Nohemi Vieyra PA-C  #[id number] CC: Left hand weakness     HPI: Asked by nurse to evaluate patient for left hand weakness. Patient seen and examined at the bedside. Patient was awake when she noticed her left hand was "trembling" and "dropping" when extended. Patient states this left hand weakness is new for her, despite a history of chronic spasticity and pain being documented. Denies headaches, dizziness, syncope, visual changes, slurring of words, facial droop, numbness or tingling.  Denies chest pain, palpitations, dyspnea, abdominal pain, N/V/D.     Vital Signs Last 24 Hrs  T(C): 36.7 (09 Sep 2019 04:54), Max: 37.2 (08 Sep 2019 20:51)  T(F): 98.1 (09 Sep 2019 04:54), Max: 99 (08 Sep 2019 20:51)  HR: 101 (09 Sep 2019 04:54) (101 - 104)  BP: 155/85 (09 Sep 2019 04:54) (122/69 - 155/85)  RR: 18 (09 Sep 2019 04:54) (18 - 18)  SpO2: 95% (09 Sep 2019 04:54) (95% - 97%)    PHYSICAL EXAM:  General: NAD, A&Ox3  Neurologic: Mild left hand tremor noted both while resting and with movement. CN II-XII grossly intact. Negative pronator drift. Strength 5/5 bilateral upper extremities. 5/5 bilateral  strength. Strength 0/5 on bilateral lower extremities due to patient being paraplegic  Respiratory: Lungs clear to auscultation bilaterally. No wheezes, rales, rhonchi. Normal respiratory effort.   Cardiovascular: S1, S2 present. Regular rate and rhythm. No murmurs, wheezes, or gallops  Gastrointestinal: BS x4 normoactive. Soft, non-tender, non-distended.   Extremities: 2+ peripheral pulses. No edema, cyanosis.    A/P  69 y/o F with PMHx of aortic dissection (post-repair several years prior), spinal cord hematoma (now functionally paraplegic), chronic spasticity and pain (on Oxycodone and Baclofen pump), HTN, nephrolithiasis s/p left urethral stent, UTIs and endotheliitis/keratitis (post-corneal transplant). Patient had recent hospitalizations at [facility] for UGIB with acute blood loss anemia requiring multiple PRBC transfusions. Work up there included CTA a/p which was negative and unable to localize source of bleed, s/p multiple EGD which showed pooled blood and/or adherent clot in gastric fundus that could not be removed. Patient transferred here for further management and evaluation by Dr. Ambrocio. Patient is s/p repeat EGD on 9/5 that showed acute gastritis, gastric polyps with no source of GI bleed identified.  Now, patient with new onset left hand weakness. Patient was awake when she noticed her left hand was "trembling" and "dropping" when extended. Patient states this left hand weakness is new for her, despite a history of chronic spasticity and pain being documented. Denies headaches, dizziness, syncope, visual changes, slurring of words, facial droop, numbness or tingling.    #Left hand weakness, ? resting tremors, r/o bleed   -Repeat vitals shows elevated manual BP of 156/72; will repeat BP in another hour  -F/u URGENT CT head to r/o bleed  -Give morning meds as scheduled  -Consider neuro consult in the AM  -Will continue to monitor  -Will endorse to day team      Nohemi Vieyra PA-C  #[id number] CC: Left hand weakness     HPI: Asked by nurse to evaluate patient for left hand weakness. Patient seen and examined at the bedside. Patient was awake when she noticed her left hand was "trembling" and "dropping" when extended. Patient states this left hand weakness is new for her, despite a history of chronic spasticity and pain being documented. Denies headaches, dizziness, syncope, visual changes, slurring of words, facial droop, numbness or tingling.  Denies chest pain, palpitations, dyspnea, abdominal pain, N/V/D.     Vital Signs Last 24 Hrs  T(C): 36.7 (09 Sep 2019 04:54), Max: 37.2 (08 Sep 2019 20:51)  T(F): 98.1 (09 Sep 2019 04:54), Max: 99 (08 Sep 2019 20:51)  HR: 101 (09 Sep 2019 04:54) (101 - 104)  BP: 155/85 (09 Sep 2019 04:54) (122/69 - 155/85)  RR: 18 (09 Sep 2019 04:54) (18 - 18)  SpO2: 95% (09 Sep 2019 04:54) (95% - 97%)    PHYSICAL EXAM:  General: NAD, A&Ox3  Neurologic: Mild left hand tremor noted both while resting and with movement. CN II-XII grossly intact. Negative pronator drift. Strength 5/5 bilateral upper extremities. 5/5 bilateral  strength. Strength 0/5 on bilateral lower extremities due to patient being paraplegic  Respiratory: Lungs clear to auscultation bilaterally. No wheezes, rales, rhonchi. Normal respiratory effort.   Cardiovascular: S1, S2 present. Regular rate and rhythm. No murmurs, wheezes, or gallops  Gastrointestinal: BS x4 normoactive. Soft, non-tender, non-distended.   Extremities: 2+ peripheral pulses. No edema, cyanosis.    A/P  67 y/o F with PMHx of aortic dissection (post-repair several years prior), spinal cord hematoma (now functionally paraplegic), chronic spasticity and pain (on Oxycodone and Baclofen pump), HTN, nephrolithiasis s/p left urethral stent, UTIs and endotheliitis/keratitis (post-corneal transplant). Patient had recent hospitalizations at [facility] for UGIB with acute blood loss anemia requiring multiple PRBC transfusions. Work up there included CTA a/p which was negative and unable to localize source of bleed, s/p multiple EGD which showed pooled blood and/or adherent clot in gastric fundus that could not be removed. Patient transferred here for further management and evaluation by Dr. Ambroico. Patient is s/p repeat EGD on 9/5 that showed acute gastritis, gastric polyps with no source of GI bleed identified.  Now, patient with new onset left hand weakness. Patient was awake when she noticed her left hand was "trembling" and "dropping" when extended. Patient states this left hand weakness is new for her, despite a history of chronic spasticity and pain being documented. Denies headaches, dizziness, syncope, visual changes, slurring of words, facial droop, numbness or tingling. Neuro exam is significant for mild left hand tremor both while resting and with movement, but is otherwise benign    #Left hand weakness, ? resting tremors, r/o bleed   -Repeat vitals shows elevated manual BP of 156/72; will repeat BP in another hour  -F/u URGENT CT head to r/o bleed  -Give morning meds as scheduled  -Consider neuro consult in the AM  -Will continue to monitor  -Will endorse to day team      Nohemi Vieyra PA-C  #[id number]

## 2019-09-09 NOTE — CONSULT NOTE ADULT - SUBJECTIVE AND OBJECTIVE BOX
Patient is a 68y old  Female who presents with a chief complaint of recurrent UGI bleeding (08 Sep 2019 14:52)      HPI:  68F with PMHx of aortic dissection (post-repair several years prior), spinal cord hematoma (now functionally paraplegic, she can move her left leg), chronic spasticity and pain (on PRN Oxycodone and has Baclofen pump), HTN, nephrolithiasis s/p left urethral stent and endotheliitis/keratitis (post-corneal transplant) transferred from [facility] after suffering UGIB. Patient has complicated medical history and recent medical course not full reflected on chart review, but collateral obtained from . Patient was at home in early August 2019 when  though she was not mentating well. He took her vitals at that time and found her to have a systolic BP in the 70s and HR in the 150s. When he brought her to [facility] they found her Hg to be 4.4. Unknown if she was having melena or hematochezia at that time. While she was there patient underwent four EGDs and received more than 10 units PRBC. EGDs were unable to identify active source of bleeding and usually showed pooled blood in the stomach. There is a suspicion for a dieulafoy lesion, but unable to be acted upon at [facility] with the multiple EGDs at times only showing adherent clot. She was evaluated by surgery who recommended ex-lap given the severity, but patient's family deferred for now. Patient spent a majority of her time in [facility] in the ICU. CT angiogram could not identify active source of bleeding with IR evaluation stating they could not provide a solution given the patient's previous dissection and collateral arterial formation. Patient's PMD is Dr. Branham who recommended transfer to [facility] for evaluation by Dr. Ambrocio. When seen by me patient had no active complaints. She denies any hematochezia or melena. She is slightly forgetful given the recent medical events and is hopeful that a solution can be found. (04 Sep 2019 20:17)      PAST MEDICAL & SURGICAL HISTORY:  Dorsalgia of lumbar region: on pain medication /baclofen po and pump  Self-catheterizes urinary bladder  Anemia: chronic  UGI bleed  Uveitis  Osteoporosis  PAD (peripheral artery disease)  Hematoma: spinal  September  treated  September 2018  Paraplegia: on wheelchair goes to physical therapy 2 x weekly  Aortic dissection, thoracic: Type A Repaired 2009  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2018  UTI (urinary tract infection): stable x 3 months  TIA (transient ischemic attack)  HTN (Hypertension): on meds  History of corneal transplant: left corneal transplant on 5/21/2018  Disorder of spine: unthetethering 2 x  Presence of IVC filter: 2014 ?  S/P aortic dissection repair: Type A Dissection repair /2009   descending aortic aneurysm repair 9/2016  H/O Spinal surgery: laminectomies 2014      MEDICATIONS  (STANDING):  artificial  tears Solution 1 Drop(s) Both EYES daily  ascorbic acid 500 milliGRAM(s) Oral daily  atorvastatin 40 milliGRAM(s) Oral at bedtime  baclofen 20 milliGRAM(s) Oral four times a day  DULoxetine 20 milliGRAM(s) Oral two times a day  erythromycin   Ointment 1 Application(s) Left EYE two times a day  gabapentin 600 milliGRAM(s) Oral three times a day  influenza   Vaccine 0.5 milliLiter(s) IntraMuscular once  lidocaine   Patch 1 Patch Transdermal daily  multivitamin 1 Tablet(s) Oral daily  ofloxacin 0.3% Solution 2 Drop(s) Left EYE four times a day  pantoprazole  Injectable 40 milliGRAM(s) IV Push two times a day  prednisoLONE acetate 1% Suspension 1 Drop(s) Left EYE daily  valACYclovir 1000 milliGRAM(s) Oral three times a day    MEDICATIONS  (PRN):  acetaminophen   Tablet .. 650 milliGRAM(s) Oral every 6 hours PRN Mild Pain (1 - 3), Moderate Pain (4 - 6)  morphine  - Injectable 2 milliGRAM(s) IV Push every 4 hours PRN Severe Pain (7 - 10)  ondansetron Injectable 4 milliGRAM(s) IV Push every 6 hours PRN Nausea and/or Vomiting  polyethylene glycol 3350 17 Gram(s) Oral every 12 hours PRN Constipation  sodium chloride 2% Ophthalmic Solution 1 Drop(s) Left EYE four times a day PRN dryness  zolpidem 5 milliGRAM(s) Oral at bedtime PRN Insomnia      FAMILY HISTORY:  Family history of Alzheimer's disease      SOCIAL HISTORY: , no alcohol use    CIGARETTES: non smoker    REVIEW OF SYSTEMS  General: fatigue	  Ophthalmologic: tenderness and swelling of the L eyelid  Respiratory and Thorax: no SOB	  Cardiovascular:	see HPI  Gastrointestinal:	see HPI  Musculoskeletal:	 R leg pain  Neurological: functional paraplegia due to spinal hemetoma  Psychiatric: negative   	  Vital Signs Last 24 Hrs  T(C): 36.7 (09 Sep 2019 04:54), Max: 37.2 (08 Sep 2019 20:51)  T(F): 98.1 (09 Sep 2019 04:54), Max: 99 (08 Sep 2019 20:51)  HR: 101 (09 Sep 2019 04:54) (101 - 104)  BP: 155/85 (09 Sep 2019 04:54) (122/69 - 155/85)  BP(mean): --  RR: 18 (09 Sep 2019 04:54) (18 - 18)  SpO2: 95% (09 Sep 2019 04:54) (95% - 97%)  PHYSICAL EXAM:      Constitutional: Thin F in NAD  Eyes: L upper eyelid with swelling  Neck: no JVD  Respiratory: clear lungs anteriorly  Cardiovascular: RRR, no murmurs noted  Gastrointestinal: BS present, NT, ND  Extremities: no edema  Neurological: LE weakness      TELEMETRY:    ECG: < from: 12 Lead ECG (08.28.19 @ 22:43) >  Ventricular Rate 151 BPM    Atrial Rate 151 BPM    P-R Interval 120 ms    QRS Duration 82 ms    Q-T Interval 292 ms    QTC Calculation(Bezet) 462 ms    R Axis -24 degrees    T Axis 59 degrees    Diagnosis Line Sinus tachycardia  Nonspecific ST abnormality  Abnormal ECG  When compared with ECG of 18-AUG-2019 20:17,  ST now depressed in Inferior leads  Nonspecific T wave abnormality, improved in Lateral leads  Confirmed by Adriel Kumar MD ([id number]) on 8/29/2019 8:53:17 PM    < end of copied text >        LABS:                        6.8    13.1  )-----------( 404      ( 08 Sep 2019 20:18 )             21.1     09-09    141  |  105  |  25<H>  ----------------------------<  116<H>  3.8   |  25  |  0.53    Ca    8.7      09 Sep 2019 07:07              I&O's Summary    08 Sep 2019 07:01  -  09 Sep 2019 07:00  --------------------------------------------------------  IN: 200 mL / OUT: 1550 mL / NET: -1350 mL      BNP  RADIOLOGY & ADDITIONAL STUDIES:  < from: CT Head No Cont (09.09.19 @ 08:52) >    EXAM:  CT BRAIN                            PROCEDURE DATE:  09/09/2019            INTERPRETATION:  INDICATIONS:  R/o bleed  . Recent onset of the left hand   weakness and tremors    TECHNIQUE:  Serial axial images were obtained from the skull base to the   vertex without intravenous contrast. Coronal and sagittal reformatted   images were obtained.    COMPARISON EXAMINATION: 9/20/2018    FINDINGS:    VENTRICLES AND SULCI:  Prominent in size compatible with age-appropriate   volume loss    INTRA-AXIAL:  Areas of white matter hypodensity are seen within the   cerebral hemispheres bilaterally compatible with mild microvascular-type   changes. No mass, blood or abnormal attenuation is otherwise seen.    EXTRA-AXIAL:  No mass or collection is seen.    VISUALIZED SINUSES:  Clear.    VISUALIZED MASTOIDS:  Clear.    CALVARIUM:  Normal.    MISCELLANEOUS:  Elongation of the globes is seen bilaterally, stable.      IMPRESSION:    Stable exam.    No mass effect, hemorrhage or evidence of acute intracranial pathology.                    ROSIE SCHERER M.D., ATTENDING RADIOLOGIST  This document has been electronically signed. Sep  9 2019  9:04AM                < end of copied text >  < from: Upper Endoscopy (09.05.19 @ 13:50) >  ____________________________________________________________________________________________________  Patient Name: Marce Portillo                      MRN: [id number]  Account Number: [id number]                     YOB: 1950  Room: Endoscopy Room 4                           Gender: Female  Attending MD: Donell Ambrocio MD                  Procedure Date No Time: 9/5/2019  ____________________________________________________________________________________________________     Procedure:           Upper GI endoscopy  Indications:         Recent gastrointestinal bleeding, Suspected upper gastrointestinal bleeding  Providers:           Donell Ambrocio MD  Referring MD:        ALPHONSO BRANHAM  Medicines:           Monitored Anesthesia Care  Complications:       No immediate complications.  ____________________________________________________________________________________________________  Procedure:           After obtaining informed consent, the endoscope was passed under direct                        vision. Throughout the procedure, the patient's blood pressure, pulse, and                        oxygen saturations were monitored continuously. The Endoscope was introduced        through the mouth, and advanced to the second part of duodenum. The upper GI                        endoscopy was accomplished without difficulty. The patient tolerated the                        procedure well.                                                                       Findings:       The examined esophagus was normal.       There is no endoscopic evidence of Jiménez's esophagus, bleeding, areas of erosion,        esophagitis, mucosal abnormalities, stricture, ulcerations or varices in the entire esophagus.       Localized moderate inflammation characterized by erosions and granularity was found on the        greater curvature of the gastric body. For hemostasis, two hemostatic clips were successfully        placed (MR conditional). There was no bleeding during, and at the end, of the procedure.       A few diminutive sessile polyps with no bleeding and no stigmata of recent bleeding were        found in the gastric fundus.       Localizedmild inflammation characterized by congestion (edema) was found in the prepyloric        region of the stomach.       There is no endoscopic evidence of bleeding, ulceration or varices in the entire examined        stomach.       The ampulla, duodenal bulb, 2nd part of the duodenum and 3rd part of the duodenum were normal.                                                                                                        Impression:          - Normal esophagus.                       - Acute gastritis. Clips (MR conditional) were placed.                       - A few gastric polyps.                       - Gastritis.                       - Normal ampulla, duodenal bulb, 2nd part of the duodenum and 3rd part of the                        duodenum.                       - No specimens collected.                       No source of GI bleed now. Only moderated focal gastritis, unlikely source                        but placed 2 clips.                       NO GASTRIC VARICES OR PUD. POSSIBLE DIEULAFOY'S LESION BUT DESPITE CAREFUL                        AND THROUGH ASSESSMENT, NO SIGNS OF IT.                       DESPITE NEGATIVE EXAM, SUSPECT GI BLEED FROM THE STOMACH. SHE DOES NOT NEED                        COLON OR A PILLCAM STUDY.  Recommendation:      - Mechanical soft diet today.                       - PPI IV BID                       - Moniotor for bleeding, and if recurrent bleeding, then will need urgent EGD.                                            ________________  Donell Ambrocio MD  9/5/2019 2:56:09 PM  Number of Addenda: 0    < end of copied text >    IMPRESSION:  Recurrent UGI bleeding  HTN  TIA  h/o aortic dissection  h/o spinal hematoma    RECOMMENDATIONS:  Continue current meds  GI f/u for repeat EGD  F/u CBC and BMP  BP control  Neurology/Ophthalmology f/u

## 2019-09-09 NOTE — CONSULT NOTE ADULT - SUBJECTIVE AND OBJECTIVE BOX
Chief Complaint:  Patient is a 68y old  Female who presents with a chief complaint of Transfer from [facility] for UGIB (09 Sep 2019 12:18)    HPI:  68F with PMHx of aortic dissection (post-repair several years prior), spinal cord hematoma (now functionally paraplegic, she can move her left leg), chronic spasticity and pain (on PRN Oxycodone and has Baclofen pump), HTN, nephrolithiasis s/p left urethral stent and endotheliitis/keratitis (post-corneal transplant) transferred from [facility] after suffering UGIB. Patient has complicated medical history and recent medical course not full reflected on chart review, but collateral obtained from . Patient was at home in early August 2019 when  though she was not mentating well. He took her vitals at that time and found her to have a systolic BP in the 70s and HR in the 150s. When he brought her to [facility] they found her Hg to be 4.4. Unknown if she was having melena or hematochezia at that time. While she was there patient underwent four EGDs and received more than 10 units PRBC. EGDs were unable to identify active source of bleeding and usually showed pooled blood. There is suspicion for dieulafoy, but unable to be acted upon at [place] with the multiple EGDs at times only showing adherent clot. She was evaluated by surgery who recommended ex-lap given the severity, but patient's family deferred for now. Patient spent a majority of her time in [place] at the ICU. CT angiogram could not identify active source of bleeding with IR evaluation stating they could not provide a solution given the patient's previous dissection and collateral arterial formation. Patient's PMD is Dr. Branham who recommended transfer to [facility] for evaluation by Dr. Ambrocio. When seen by me patient had no active complaints. She states her Baclofen pump is empty. She denies any hematochezia or melena. She is slightly fretful given the recent medical events and is hopeful that a solution can be found. (04 Sep 2019 20:17)    Current out- patient pain regimen:  Baclofen/Morphine Intrathecal Pump delivering Morphine 2.4 mg and Baclofen 480 micrograms per day  Baclofen 20 mg PO BID  Oxycodone 10 mg tabs - 1/2 tab in AM, 1/2 tab in afternoon, 1 Tab PM  Gabapentin 600 mg three times daily  Cymbalta 20 mg twice daily    Discussed case with Dr. Vieira, out pt pain MD who manages patient's pump  Last seen and interrogated on July 29. Pt missed refill date. Alarm date 9/22 - still will have 1 - 2 weeks after alarm date    Out Patient Pain Management provider: Dr. Vieira    [place] Prescription Monitoring Program: Reference #[id number]    Opioid Risk Tool (ORT) Score: low    REVIEW OF SYSTEMS:  CONSTITUTIONAL: No fever, weight loss, or fatigue  NEUROLOGICAL: No headaches, loss of strength, numbness, c/o LUE "dropping" suddenly when holding arm up  GASTROINTESTINAL: No abdominal pain. No nausea, vomiting. Chronic constipation. No bowel control, self-disimpacts.  GENITOURINARY: Chronic urinary retention,  catheterizes  MUSCULOSKELETAL: C/O chronic RLE pain from hip to knee, burning. Paraplegia.     PHYSICAL EXAM:  Patient seen and examined in bed. C/O chronic RLE pain, burning, from hip to knee. States this is same pain as at home - not new.  states that pt has a spinal cord stimulator which they no longer use because they did not find it effective. She has a baclofen/morphine intrathecal pump.  GENERAL: NAD, well-groomed, well-developed, no signs of toxicity  NERVOUS SYSTEM:  A&O X3, Good concentration, mild lethargy. EOMI, PERRLA right eye, left eye pupil fixed (blind); Cranial Nerves II-XII intact. (-)pronator drift  HEAD:  Atraumatic, Normocephalic, symmetrical  EYES: Conjunctiva and sclera clear Right eye. Left eye blindness, cloudy, pupil fixed  CHEST/LUNG: Clear to auscultation bilaterally; No rales, rhonchi, wheezing, or rubs  HEART: Regular rate and rhythm  ABDOMEN: Soft, Nontender, Nondistended; Bowel sounds present. Right abdominal pump in place.  EXTREMITIES:  2+ Peripheral Pulses, No clubbing, cyanosis, or edema  MUSCULOSKELETAL: Motor Strength 5/5 B/L upper and extremities; functional paraplegia, B/L LE severe weakness, RLE weaker than LLE.       PAST MEDICAL & SURGICAL HISTORY:  Dorsalgia of lumbar region: on pain medication /baclofen po and pump  Self-catheterizes urinary bladder  Anemia: chronic  Uveitis  Osteoporosis  PAD (peripheral artery disease)  Hematoma: spinal  September  treated  September 2018  Paraplegia: on wheelchair goes to physical therapy 2 x weekly  Aortic dissection, thoracic: Type A Repaired 2009  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2018  UTI (urinary tract infection): stable x 3 months  TIA (transient ischemic attack)  HTN (Hypertension): on meds  History of corneal transplant: left corneal transplant on 5/21/2018  Disorder of spine: unthetethering 2 x  Presence of IVC filter: 2014 ?  S/P aortic dissection repair: Type A Dissection repair /2009   descending aortic aneurysm repair 9/2016  H/O Spinal surgery: laminectomies 2014    FAMILY HISTORY:  Family history of Alzheimer's disease    SOCIAL HISTORY:  Tobacco Use:    Allergies    No Known Allergies    MEDICATIONS  (STANDING):  artificial  tears Solution 1 Drop(s) Both EYES daily  ascorbic acid 500 milliGRAM(s) Oral daily  atorvastatin 40 milliGRAM(s) Oral at bedtime  baclofen 20 milliGRAM(s) Oral four times a day  cefTRIAXone   IVPB 1000 milliGRAM(s) IV Intermittent once  cefTRIAXone   IVPB      DULoxetine 20 milliGRAM(s) Oral two times a day  erythromycin   Ointment 1 Application(s) Left EYE two times a day  gabapentin 600 milliGRAM(s) Oral three times a day  influenza   Vaccine 0.5 milliLiter(s) IntraMuscular once  lidocaine   Patch 1 Patch Transdermal daily  multivitamin 1 Tablet(s) Oral daily  ofloxacin 0.3% Solution 2 Drop(s) Left EYE four times a day  pantoprazole  Injectable 40 milliGRAM(s) IV Push two times a day  prednisoLONE acetate 1% Suspension 1 Drop(s) Left EYE daily  valACYclovir 1000 milliGRAM(s) Oral three times a day    MEDICATIONS  (PRN):  acetaminophen   Tablet .. 650 milliGRAM(s) Oral every 6 hours PRN Mild Pain (1 - 3), Moderate Pain (4 - 6)  morphine  - Injectable 2 milliGRAM(s) IV Push every 4 hours PRN Severe Pain (7 - 10)  ondansetron Injectable 4 milliGRAM(s) IV Push every 6 hours PRN Nausea and/or Vomiting  polyethylene glycol 3350 17 Gram(s) Oral every 12 hours PRN Constipation  sodium chloride 2% Ophthalmic Solution 1 Drop(s) Left EYE four times a day PRN dryness  zolpidem 5 milliGRAM(s) Oral at bedtime PRN Insomnia      Vital Signs:  T(C): 36.7 (09-09-19 @ 04:54)  HR: 101 (09-09-19 @ 04:54)  BP: 155/85 (09-09-19 @ 04:54)  RR: 18 (09-09-19 @ 04:54)  SpO2: 95% (09-09-19 @ 04:54)

## 2019-09-09 NOTE — CHART NOTE - NSCHARTNOTEFT_GEN_A_CORE
Notified by RN of manual HR palpation: 110. Patient is currently receiving 1U PRBC for H/H of 6.8/21.1  Pt awake, c/o chronic back pain, which patient received morphine 2 mg IV as scheduled. Patient is otherwise asymptomatic- denies increased weakness, headaches, dizziness, syncope, chest pain, palpitations, dyspnea, abdominal pain, N/V/D.   STAT EKG shows sinus tachycardia with incomplete right bundle branch block. No acute changes when compared with past EKGs at [facility] in August 2019.  Will continue to monitor  Will endorse to day team      Nohemi Vieyra PA-C  #[id number]

## 2019-09-09 NOTE — PROGRESS NOTE ADULT - ASSESSMENT
67 y/o F with PMHx of aortic dissection (post-repair several years prior), spinal cord hematoma (now functionally paraplegic), chronic spasticity and pain (on Oxycodone and Baclofen pump), HTN, nephrolithiasis s/p left urethral stent, UTIs and endotheliitis/keratitis (post-corneal transplant). Patient had recent hospitalizations at [facility] for UGIB with acute blood loss anemia requiring multiple PRBC transfusions. Work up there included CTA a/p which was negative and unable to localize source of bleed, s/p multiple EGD which showed pooled blood and/or adherent clot in gastric fundus that could not be removed. Patient transferred here for further management and evaluation by Dr. Ambrocio. Patient is s/p repeat EGD on 9/5 that showed acute gastritis, gastric polyps with no source of GI bleed identified.

## 2019-09-10 NOTE — PROGRESS NOTE ADULT - SUBJECTIVE AND OBJECTIVE BOX
SUBJECTIVE: Pt complains of ongoing chronic leg pain. She had one small, formed dark stool today per RN. She denies abdominal pain, nausea, emesis, fever, chills. + flatus.  She reports VCE a number of years ago that was well tolerated.  Last PRBC on .    MEDICATIONS  (STANDING):  artificial  tears Solution 1 Drop(s) Both EYES daily  ascorbic acid 500 milliGRAM(s) Oral daily  atorvastatin 40 milliGRAM(s) Oral at bedtime  baclofen 20 milliGRAM(s) Oral four times a day  cefTRIAXone   IVPB 1000 milliGRAM(s) IV Intermittent every 24 hours  cefTRIAXone   IVPB      DULoxetine 30 milliGRAM(s) Oral two times a day  erythromycin   Ointment 1 Application(s) Left EYE two times a day  gabapentin 800 milliGRAM(s) Oral three times a day  influenza   Vaccine 0.5 milliLiter(s) IntraMuscular once  lidocaine   Patch 1 Patch Transdermal daily  lidocaine   Patch 1 Patch Transdermal daily  multivitamin 1 Tablet(s) Oral daily  ofloxacin 0.3% Solution 2 Drop(s) Left EYE four times a day  oxyCODONE    IR 5 milliGRAM(s) Oral two times a day  oxyCODONE    IR 10 milliGRAM(s) Oral at bedtime  pantoprazole  Injectable 40 milliGRAM(s) IV Push two times a day  prednisoLONE acetate 1% Suspension 1 Drop(s) Left EYE daily  valACYclovir 1000 milliGRAM(s) Oral three times a day    MEDICATIONS  (PRN):  acetaminophen   Tablet .. 650 milliGRAM(s) Oral every 6 hours PRN Mild Pain (1 - 3), Moderate Pain (4 - 6)  ondansetron Injectable 4 milliGRAM(s) IV Push every 6 hours PRN Nausea and/or Vomiting  polyethylene glycol 3350 17 Gram(s) Oral every 12 hours PRN Constipation  sodium chloride 2% Ophthalmic Solution 1 Drop(s) Left EYE four times a day PRN dryness  zolpidem 5 milliGRAM(s) Oral at bedtime PRN Insomnia    OBJECTIVE:  Vital Signs Last 24 Hrs  T(C): 36.2 (10 Sep 2019 05:24), Max: 36.6 (09 Sep 2019 21:00)  T(F): 97.2 (10 Sep 2019 05:24), Max: 97.8 (09 Sep 2019 21:00)  HR: 101 (10 Sep 2019 05:24) (80 - 103)  BP: 126/80 (10 Sep 2019 05:24) (126/80 - 153/77)  BP(mean): --  RR: 18 (10 Sep 2019 05:24) (18 - 18)  SpO2: 95% (10 Sep 2019 05:24) (95% - 100%)    PHYSICAL EXAM:  Constitutional: A&OX3, in NAD,   HEENT: NCAT, no scleral icterus, no palpable LAD  Cardiovascular: RRR, S1 and S2, no audible murmurs  Respiratory: CTAB   Gastrointestinal: soft, mild distention and tympany, nontender throughout, normoactive bowel sounds, no palpable HSM  Extremities: No peripheral edema b/l     LABS:                        7.2    12.40 )-----------( 302      ( 10 Sep 2019 09:14 )             23.9     09-10    140  |  106  |  20  ----------------------------<  146<H>  3.3<L>   |  27  |  0.63    Ca    8.5      10 Sep 2019 06:31  Phos  3.0     09-10  Mg     2.0     09-10    TPro  5.4<L>  /  Alb  2.9<L>  /  TBili  0.2  /  DBili  x   /  AST  13  /  ALT  8<L>  /  AlkPhos  82  09-10      Urinalysis Basic - ( 09 Sep 2019 12:18 )    Color: Yellow / Appearance: Turbid / S.013 / pH: x  Gluc: x / Ketone: Negative  / Bili: Negative / Urobili: Negative   Blood: x / Protein: 30 mg/dL / Nitrite: Negative   Leuk Esterase: Large / RBC: 4 /hpf /  /HPF   Sq Epi: x / Non Sq Epi: 1 /hpf / Bacteria: Moderate      LIVER FUNCTIONS - ( 10 Sep 2019 06:31 )  Alb: 2.9 g/dL / Pro: 5.4 g/dL / ALK PHOS: 82 U/L / ALT: 8 U/L / AST: 13 U/L / GGT: x           RADIOLOGY & ADDITIONAL TESTS:  < from: CT Angio Abdomen and Pelvis w/ IV Cont (19 @ 17:52) >    LIVER: Within normal limits.  BILE DUCTS: Normal caliber.  GALLBLADDER: Layering hyperdensity within the gallbladder likely   represents vicarious excretion of contrast.  SPLEEN: Within normal limits.  PANCREAS: Within normal limits.  ADRENALS: Within normal limits.  KIDNEYS/URETERS: Atrophic left kidney with mildly decreased enhancement.   There is a left ureteral stent again noted, appropriately positioned. A   large 1.2 x 0.8 cm calculus in the mid left ureter is unchanged. Punctate   nonobstructing calculi again noted in the left kidney.    BLADDER: Distended with distal tip of left ureteral stent.  REPRODUCTIVE ORGANS: Uterus and adnexa within normal limits.    BOWEL: There is no evidence for bleed/extravasation into the bowel. No   hypervascular bowel lesions are identified. No bowel obstruction.   Appendix is not visualized. No definite evidence of inflammation in the   pericecal region.  PERITONEUM: No ascites.  VESSELS: Again noted is a aneurysmally dilated and tortuous descending   thoracic aorta. Stable abdominal aortic dissection is again noted   extending into the right iliac artery. Tortuous collateral vessels are   again noted in the upper abdomen in the periportal and perisplenic   regions. An IVC filter is again noted.  RETROPERITONEUM/LYMPH NODES: No lymphadenopathy.    ABDOMINAL WALL: Mild diffuse soft tissue anasarca. Generator pack for a   stimulator is present in the left gluteal area. Another stimulator   generator pack in the right lower anterior abdominal wall.  BONES: Median sternotomy wires. Degenerative change of the lumbar spine.    < end of copied text >    < from: Enteroscopy (19 @ 18:18) >  The esophagus was normal.       An endoclip was found in the gastric body.       Localized mild inflammation characterized by erosions and erythema was found in the cardia.        Biopsies were taken with a cold forceps for histology.       There is no endoscopic evidence of bleeding, inflammation, ulceration or varices in the        entire examined stomach.       The examined duodenum was normal.       There was no evidence of significant pathology in the entire examined portion of jejunum.                                                                                                        Impression:          - Normal esophagus except of small hiatal hernia.                       - An endoclip was found in the stomach.                       - Gastritis. Biopsied.                       - Normal examined duodenum.                       - The examined portion of the jejunum was normal.                       No signs of active bleeding    < end of copied text >

## 2019-09-10 NOTE — PROGRESS NOTE ADULT - SUBJECTIVE AND OBJECTIVE BOX
S: Pt c/o FB sensation OS    General: AAO x 2-3, appropriate mood and affect    Ophthalmology Exam:  Visual acuity (cc): 20/40 OD, HM OS  Pupils: PERRL OD, OS surgical  Ttono: 16, 17  Extraocular movements (EOMs): Full OU, no pain, no diplopia    Slit lamp Exam (SLE)  External: Flat OU  Lids/Lashes/Lacrimal Ducts: Flat OU, some mucoid discharge OS  Sclera/Conjunctiva: W+Q OD, mild injection OS  Cornea: Cl OD,  PK OS, stitches buried- no loose stitches; bullous elevation of epithelium centrally/inferomedially, no  epidefect, or Khodadoust lines    Anterior Chamber: D+Q OU, no cell/flare   Iris: Flat OU  Lens: 2+ NS OD, dense cataract OS    No view OS 2/2 cataract  B-scan done 9/8/19: no RT, RD, or mass seen OS    Assessment and Recommendation:   68y female w/ 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  [place] [address]  [phone]    s/d/w Dr. Joshi S: Pt c/o FB sensation OS    General: AAO x 2-3, appropriate mood and affect    Ophthalmology Exam:  Visual acuity (cc): 20/40 OD, HM OS  Pupils: PERRL OD, OS surgical  Ttono: 16, 17  Extraocular movements (EOMs): Full OU, no pain, no diplopia    Slit lamp Exam (SLE)  External: Flat OU  Lids/Lashes/Lacrimal Ducts: Flat OU, some mucoid discharge OS  Sclera/Conjunctiva: W+Q OD, mild injection OS  Cornea: Cl OD,  PK OS, stitches buried- no loose stitches; bullous elevation of epithelium centrally/inferomedially, no  epidefect, or Khodadoust lines    Anterior Chamber: D+Q OU, no cell/flare   Iris: Flat OU  Lens: 2+ NS OD, dense cataract OS    No view OS 2/2 cataract  B-scan done 9/8/19: no RT, RD, or mass seen OS    Assessment and Recommendation:   68y female w/ 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 Department of Ophthalmology- Cornea within 1 week of after discharge at:    [address]  [place] [address] [phone]    s/d/w Dr. Joshi  d/w Dr. Blank (cornea) S: Pt c/o FB sensation OS, but unchanged.  No change in vision.    General: AAO x 2-3, appropriate mood and affect    Ophthalmology Exam:  Visual acuity (cc): 20/40 OD, HM OS  Pupils: PERRL OD, OS surgical  Ttono: 16, 17  Extraocular movements (EOMs): Full OU, no pain, no diplopia    Slit lamp Exam (SLE)  External: Flat OU  Lids/Lashes/Lacrimal Ducts: Flat OU, some mucoid discharge OS  Sclera/Conjunctiva: W+Q OD, mild injection OS  Cornea: Cl OD,  PK OS, stitches buried- no loose stitches; bullous elevation of epithelium centrally/inferomedially measuring approx 4x4mm, no  epi defect, or Khodadoust lines, diffuse K edema OS  Anterior Chamber: D+Q OU, no cell/flare   Iris: Flat OU  Lens: 2+ NS OD, dense cataract OS    No view OS 2/2 cataract  B-scan done 9/8/19: no RT, RD, or mass seen OS    Assessment and Recommendation:   68y female w/ 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  Pt's exam findings discussed with patient's outpt ophthalmologist, Dr. Blank.  Aware that graft is failing.  Rec pred forte for treatment.  - stop Ocufox and Erythro ointment  - c/w Prednisolone acetate 1% drops QID in left eye  - c/w valtrex PO 1g TID   - outpatient follow up  - findings and plan discussed with primary team, will discuss with patient tomorrow.    Outpatient follow-up: Patient should follow-up with his/her ophthalmologist or with [facility] Department of Ophthalmology- Cornea within 1 week of after discharge at:    [address]  [place], [place] [id number] [phone]    s/d/w Dr. Joshi  d/w Dr. Blank (cornea)

## 2019-09-10 NOTE — PROGRESS NOTE ADULT - SUBJECTIVE AND OBJECTIVE BOX
Patient found to have UTI.  Notes increase in spasticity.     MEDICATIONS  (STANDING):  artificial  tears Solution 1 Drop(s) Both EYES daily  ascorbic acid 500 milliGRAM(s) Oral daily  atorvastatin 40 milliGRAM(s) Oral at bedtime  baclofen 20 milliGRAM(s) Oral four times a day  cefTRIAXone   IVPB 1000 milliGRAM(s) IV Intermittent every 24 hours  cefTRIAXone   IVPB      DULoxetine 30 milliGRAM(s) Oral two times a day  erythromycin   Ointment 1 Application(s) Left EYE two times a day  gabapentin 800 milliGRAM(s) Oral three times a day  influenza   Vaccine 0.5 milliLiter(s) IntraMuscular once  lidocaine   Patch 1 Patch Transdermal daily  lidocaine   Patch 1 Patch Transdermal daily  multivitamin 1 Tablet(s) Oral daily  ofloxacin 0.3% Solution 2 Drop(s) Left EYE four times a day  oxyCODONE    IR 5 milliGRAM(s) Oral two times a day  oxyCODONE    IR 10 milliGRAM(s) Oral at bedtime  pantoprazole  Injectable 40 milliGRAM(s) IV Push two times a day  prednisoLONE acetate 1% Suspension 1 Drop(s) Left EYE daily  valACYclovir 1000 milliGRAM(s) Oral three times a day    MEDICATIONS  (PRN):  acetaminophen   Tablet .. 650 milliGRAM(s) Oral every 6 hours PRN Mild Pain (1 - 3), Moderate Pain (4 - 6)  ondansetron Injectable 4 milliGRAM(s) IV Push every 6 hours PRN Nausea and/or Vomiting  polyethylene glycol 3350 17 Gram(s) Oral every 12 hours PRN Constipation  sodium chloride 2% Ophthalmic Solution 1 Drop(s) Left EYE four times a day PRN dryness  zolpidem 5 milliGRAM(s) Oral at bedtime PRN Insomnia    Vital Signs Last 24 Hrs  T(C): 36.2 (10 Sep 2019 05:24), Max: 36.6 (09 Sep 2019 21:00)  T(F): 97.2 (10 Sep 2019 05:24), Max: 97.8 (09 Sep 2019 21:00)  HR: 101 (10 Sep 2019 05:24) (80 - 103)  BP: 126/80 (10 Sep 2019 05:24) (126/80 - 153/77)  BP(mean): --  RR: 18 (10 Sep 2019 05:24) (18 - 18)  SpO2: 95% (10 Sep 2019 05:24) (95% - 100%)    In NAD, pleasant  No edema, nml DPP  LEs 0/5  MAS 1 spasticity of bl LEs    Imp/Recs:  - Discussed w medical team- increase in spasticity likely due to UTI which is being treated.   - Patients low alarm date for pump is 9/22- if not discharged by that date i will do refill- however, preferable to have Dr. Castro do refill with pain meds as well - advised to make appt w him as outpatient so if discharged can see him.  - Continue local care/ specialty bed  - f/u w me as outpatient.

## 2019-09-10 NOTE — PROGRESS NOTE ADULT - ASSESSMENT
67 y/o F with PMHx of aortic dissection (post-repair several years prior), spinal cord hematoma (now functionally paraplegic), chronic spasticity and pain (on Oxycodone and Baclofen pump), HTN, nephrolithiasis s/p left urethral stent, UTIs and endotheliitis/keratitis (post-corneal transplant). Patient had recent hospitalizations at [facility] for UGIB with acute blood loss anemia requiring multiple PRBC transfusions. Work up there included CTA a/p which was negative and unable to localize source of bleed, s/p multiple EGD which showed pooled blood and/or adherent clot in gastric fundus that could not be removed. Patient transferred here for further management and evaluation by Dr. Ambrocio. Patient is s/p repeat EGD on 9/5 that showed acute gastritis, gastric polyps with no source of GI bleed identified.

## 2019-09-10 NOTE — PROGRESS NOTE ADULT - SUBJECTIVE AND OBJECTIVE BOX
Chief Complaint:  Patient is a 68y old  Female who presents with a chief complaint of Transfer from [facility] for UGIB.    HPI:  68F with PMHx of aortic dissection (post-repair several years prior), spinal cord hematoma (now functionally paraplegic, she can move her left leg), chronic spasticity and pain (on PRN Oxycodone and has Baclofen pump), HTN, nephrolithiasis s/p left urethral stent and endotheliitis/keratitis (post-corneal transplant) transferred from [facility] after suffering UGIB. Patient has complicated medical history and recent medical course not full reflected on chart review, but collateral obtained from . Patient was at home in early August 2019 when  though she was not mentating well. He took her vitals at that time and found her to have a systolic BP in the 70s and HR in the 150s. When he brought her to [facility] they found her Hg to be 4.4. Unknown if she was having melena or hematochezia at that time. While she was there patient underwent four EGDs and received more than 10 units PRBC. EGDs were unable to identify active source of bleeding and usually showed pooled blood. There is suspicion for dieulafoy, but unable to be acted upon at [place] with the multiple EGDs at times only showing adherent clot. She was evaluated by surgery who recommended ex-lap given the severity, but patient's family deferred for now. Patient spent a majority of her time in [place] at the ICU. CT angiogram could not identify active source of bleeding with IR evaluation stating they could not provide a solution given the patient's previous dissection and collateral arterial formation. Patient's PMD is Dr. Branham who recommended transfer to [facility] for evaluation by Dr. Ambrocio.   Has a H/O dorsalgia, and has a Medtronic intrathecal infusion device n place. Is under the care of community pain management specialist Dr Vieira in [place].    Current in-patient pain therapy:  MEDICATIONS  (STANDING):  artificial  tears Solution 1 Drop(s) Both EYES daily  ascorbic acid 500 milliGRAM(s) Oral daily  atorvastatin 40 milliGRAM(s) Oral at bedtime  baclofen 20 milliGRAM(s) Oral four times a day  cefTRIAXone   IVPB 1000 milliGRAM(s) IV Intermittent every 24 hours  cefTRIAXone   IVPB      DULoxetine 30 milliGRAM(s) Oral two times a day  erythromycin   Ointment 1 Application(s) Left EYE two times a day  gabapentin 800 milliGRAM(s) Oral three times a day  influenza   Vaccine 0.5 milliLiter(s) IntraMuscular once  lidocaine   Patch 1 Patch Transdermal daily  lidocaine   Patch 1 Patch Transdermal daily  multivitamin 1 Tablet(s) Oral daily  ofloxacin 0.3% Solution 2 Drop(s) Left EYE four times a day  oxyCODONE    IR 5 milliGRAM(s) Oral two times a day  oxyCODONE    IR 10 milliGRAM(s) Oral at bedtime  pantoprazole  Injectable 40 milliGRAM(s) IV Push two times a day  prednisoLONE acetate 1% Suspension 1 Drop(s) Left EYE daily  valACYclovir 1000 milliGRAM(s) Oral three times a day    MEDICATIONS  (PRN):  acetaminophen   Tablet .. 650 milliGRAM(s) Oral every 6 hours PRN Mild Pain (1 - 3), Moderate Pain (4 - 6)  ondansetron Injectable 4 milliGRAM(s) IV Push every 6 hours PRN Nausea and/or Vomiting  polyethylene glycol 3350 17 Gram(s) Oral every 12 hours PRN Constipation  sodium chloride 2% Ophthalmic Solution 1 Drop(s) Left EYE four times a day PRN dryness  zolpidem 5 milliGRAM(s) Oral at bedtime PRN Insomnia    PHYSICAL EXAM:  Seen at bedside, awake and alert.  Is c/o leg pain which is baseline.    T(C): 36.2 (09-10-19 @ 05:24)  HR: 101 (09-10-19 @ 05:24)  BP: 126/80 (09-10-19 @ 05:24)  RR: 18 (09-10-19 @ 05:24)  SpO2: 95% (09-10-19 @ 05:24)    Pertinent labs, radiology, additional studies:

## 2019-09-10 NOTE — PROGRESS NOTE ADULT - ASSESSMENT
Patient is a 68y old  Female with an UGIB, h/o chronic spine and leg pain from dorsalgia.    Medtronic 40 cc intrathecal pump contains  Lioresal 2000 mcg/ ml at 480 mcg/ day  Morphine 10 mg/ ml at 2.4 mg/ day  Pump will alarm low reservoir volume on 9/22/2019.  At the time of alarm she will have 2 cc remaining in the pump reservoir.    Continue oxycodone, gabapentin, duloxetine, and lidoderm.  Agree with prune juice and prunes for constipation, as well as miralax prn.    Minutes spent on total encounter: fifteen minutes      [facility]  [phone]

## 2019-09-10 NOTE — PROGRESS NOTE ADULT - ASSESSMENT
68 year old female with multiple comorbidities transferred from [facility] with ongoing anemia after a massive UGIB. No active bleeding on 2 endoscopies since admission. Hemoglobin continues to fluctuate. Scant melena if any.  -Check PM CBC and am cbc.  -NPO after midnight for tentative video capsule endoscopy in the morning. Pt in agreement  -Monitor for and call with ongoing melena  -miralax x1 now  Above d/w Dr. Ambrocio

## 2019-09-10 NOTE — PROGRESS NOTE ADULT - PROBLEM SELECTOR PLAN 1
-with acute blood loss anemia  -There was concern for questionable Dieulafoy lesion, most recent EGD on 8/30 revealed pooled blood in gastric fundus and adherent clot that could not be removed. CTA a/p could not localize bleed and per IR at OSH, celiac axis too tortuous for mesenteric angiogram. patient was also evaluated by surgery and there was discussion about possibility of exlap with gastrotomy which was deferred at this time.   -s/p EGD by Dr. Ambrocio on 9/5 which showed acute gastritis, gastric polyps with no source of GI bleed identified  -s/p enteroscopy 9/10- jejunum normal. small hiatal hernia. gastritis  -PPI IV BID  -Monitor H/H   - NPO at midnight for pill capsule in am

## 2019-09-10 NOTE — PROGRESS NOTE ADULT - SUBJECTIVE AND OBJECTIVE BOX
Patient is a 68y old  Female who presents with a chief complaint of UGIB (10 Sep 2019 12:03)      SUBJECTIVE / OVERNIGHT EVENTS:    patient seen and examined. feels ok.   spasm improving  L eye is improving as well.    ROS:  14 point ROS negative in detail except stated as above    MEDICATIONS  (STANDING):  artificial  tears Solution 1 Drop(s) Both EYES daily  ascorbic acid 500 milliGRAM(s) Oral daily  atorvastatin 40 milliGRAM(s) Oral at bedtime  baclofen 20 milliGRAM(s) Oral four times a day  cefTRIAXone   IVPB 1000 milliGRAM(s) IV Intermittent every 24 hours  cefTRIAXone   IVPB      DULoxetine 30 milliGRAM(s) Oral two times a day  erythromycin   Ointment 1 Application(s) Left EYE two times a day  gabapentin 800 milliGRAM(s) Oral three times a day  influenza   Vaccine 0.5 milliLiter(s) IntraMuscular once  lidocaine   Patch 1 Patch Transdermal daily  lidocaine   Patch 1 Patch Transdermal daily  multivitamin 1 Tablet(s) Oral daily  ofloxacin 0.3% Solution 2 Drop(s) Left EYE four times a day  oxyCODONE    IR 5 milliGRAM(s) Oral two times a day  oxyCODONE    IR 10 milliGRAM(s) Oral at bedtime  pantoprazole  Injectable 40 milliGRAM(s) IV Push two times a day  prednisoLONE acetate 1% Suspension 1 Drop(s) Left EYE daily  valACYclovir 1000 milliGRAM(s) Oral three times a day    MEDICATIONS  (PRN):  acetaminophen   Tablet .. 650 milliGRAM(s) Oral every 6 hours PRN Mild Pain (1 - 3), Moderate Pain (4 - 6)  ondansetron Injectable 4 milliGRAM(s) IV Push every 6 hours PRN Nausea and/or Vomiting  polyethylene glycol 3350 17 Gram(s) Oral every 12 hours PRN Constipation  sodium chloride 2% Ophthalmic Solution 1 Drop(s) Left EYE four times a day PRN dryness  zolpidem 5 milliGRAM(s) Oral at bedtime PRN Insomnia      CAPILLARY BLOOD GLUCOSE        I&O's Summary    09 Sep 2019 07:01  -  10 Sep 2019 07:00  --------------------------------------------------------  IN: 770 mL / OUT: 1250 mL / NET: -480 mL        PHYSICAL EXAM:  Vital Signs Last 24 Hrs  T(C): 36.2 (10 Sep 2019 05:24), Max: 36.6 (09 Sep 2019 21:00)  T(F): 97.2 (10 Sep 2019 05:24), Max: 97.8 (09 Sep 2019 21:00)  HR: 101 (10 Sep 2019 05:24) (80 - 103)  BP: 126/80 (10 Sep 2019 05:24) (126/80 - 153/77)  BP(mean): --  RR: 18 (10 Sep 2019 05:24) (18 - 18)  SpO2: 95% (10 Sep 2019 05:24) (95% - 100%)    PHYSICAL EXAM:  GENERAL: NAD  Eyes: Left eye conjunctival erythema   LUNG: Clear to auscultation bilaterally; No wheezes rales or rhonchi  HEART: S1, S2 rrr no murmurs  ABDOMEN: Soft, Nontender, Nondistended, Bowel sounds present, + hardware palpated in RLQ  EXTREMITIES: No leg edema cyanosis or clubbing  NEUROLOGY: AAOx3, communicative, moves arms, lower extremity contracted  UE strength 4/5 sensation intact to light touch  SKIN: warm and dry      LABS:                        7.2    12.40 )-----------( 302      ( 10 Sep 2019 09:14 )             23.9     09-10    140  |  106  |  20  ----------------------------<  146<H>  3.3<L>   |  27  |  0.63    Ca    8.5      10 Sep 2019 06:31  Phos  3.0     09-10  Mg     2.0     09-10    TPro  5.4<L>  /  Alb  2.9<L>  /  TBili  0.2  /  DBili  x   /  AST  13  /  ALT  8<L>  /  AlkPhos  82  09-10          Urinalysis Basic - ( 09 Sep 2019 12:18 )    Color: Yellow / Appearance: Turbid / S.013 / pH: x  Gluc: x / Ketone: Negative  / Bili: Negative / Urobili: Negative   Blood: x / Protein: 30 mg/dL / Nitrite: Negative   Leuk Esterase: Large / RBC: 4 /hpf /  /HPF   Sq Epi: x / Non Sq Epi: 1 /hpf / Bacteria: Moderate        RADIOLOGY & ADDITIONAL TESTS:    Imaging Personally Reviewed:    < from: Enteroscopy (19 @ 18:18) >  Findings:       The esophagus was normal.       An endoclip was found in the gastric body.       Localized mild inflammation characterized by erosions and erythema was found in the cardia.        Biopsies were taken with a cold forceps for histology.       There is no endoscopic evidence of bleeding, inflammation, ulceration or varices in the        entire examined stomach.       The examined duodenum was normal.       There was no evidence of significant pathology in the entire examined portion of jejunum.                                                                                                        Impression:          - Normal esophagus except of small hiatal hernia.                       - An endoclip was found in the stomach.                       - Gastritis. Biopsied.                       - Normal examined duodenum.                       - The examined portion of the jejunum was normal.                       No signs of active bleeding  Recommendation:      - Return [facility] to [facility] for [facility] care.                       - Advance diet                       - Recheck H/H in AM                       - If continue to decline, will need PillCam study to assess small bowel and                        possible colonoscopy to rule out other area of bleeding.                                                                                                          < end of copied text >      Consultant(s) Notes Reviewed:    gi  Care Discussed with Consultants/Other Providers:  CHRISTEN Pennington, Dr Nair, Dr. Branham

## 2019-09-10 NOTE — PROGRESS NOTE ADULT - PROBLEM SELECTOR PLAN 7
-Continue with multi-modal pain management: Tylenol mild-mod pain, Morphine 2 q4h PRN severe pain, Baclofen 20 mg TID, Duloxetine 20 mg BID, and Gabapentin 600 TID  -Patient has baclofen pump - due for refill 9/22  - oxycodone 5mg IR bid, oxycodone 10mg qhs, gabapentin increased to 800mg tid

## 2019-09-11 NOTE — PROGRESS NOTE ADULT - PROBLEM SELECTOR PLAN 5
-Patient had recent left sided nephrolithiasis with urethral stent placement  + urinary retention, urology follow up inpt

## 2019-09-11 NOTE — PROGRESS NOTE ADULT - ASSESSMENT
67 y/o F with PMHx of aortic dissection (post-repair several years prior), spinal cord hematoma (now functionally paraplegic), chronic spasticity and pain (on Oxycodone and Baclofen pump), HTN, nephrolithiasis s/p left urethral stent, UTIs and endotheliitis/keratitis (post-corneal transplant). Patient had recent hospitalizations at [facility] for UGIB with acute blood loss anemia requiring multiple PRBC transfusions. Work up there included CTA a/p which was negative and unable to localize source of bleed, s/p multiple EGD which showed pooled blood and/or adherent clot in gastric fundus that could not be removed. Patient transferred here for further management and evaluation by Dr. Ambrocio. Patient is s/p repeat EGD on 9/5 that showed acute gastritis, gastric polyps with no source of GI bleed identified.

## 2019-09-11 NOTE — PROGRESS NOTE ADULT - SUBJECTIVE AND OBJECTIVE BOX
Patient is a 68y old  Female who presents with a chief complaint of Transfer from [facility] for UGIB (10 Sep 2019 15:24)      SUBJECTIVE / OVERNIGHT EVENTS:    feels ok. hitchcock catheter placed.   pill cam on    ROS:  14 point ROS negative in detail except stated as above    MEDICATIONS  (STANDING):  artificial tears (preservative free) Ophthalmic Solution 1 Drop(s) Left EYE every 2 hours  ascorbic acid 500 milliGRAM(s) Oral daily  atorvastatin 40 milliGRAM(s) Oral at bedtime  baclofen 20 milliGRAM(s) Oral four times a day  cefTRIAXone   IVPB 1000 milliGRAM(s) IV Intermittent every 24 hours  cefTRIAXone   IVPB      DULoxetine 30 milliGRAM(s) Oral two times a day  gabapentin 800 milliGRAM(s) Oral three times a day  influenza   Vaccine 0.5 milliLiter(s) IntraMuscular once  lidocaine   Patch 1 Patch Transdermal daily  lidocaine   Patch 1 Patch Transdermal daily  multivitamin 1 Tablet(s) Oral daily  oxyCODONE    IR 5 milliGRAM(s) Oral two times a day  oxyCODONE    IR 10 milliGRAM(s) Oral at bedtime  pantoprazole  Injectable 40 milliGRAM(s) IV Push two times a day  prednisoLONE acetate 1% Suspension 1 Drop(s) Left EYE daily  valACYclovir 1000 milliGRAM(s) Oral three times a day    MEDICATIONS  (PRN):  acetaminophen   Tablet .. 650 milliGRAM(s) Oral every 6 hours PRN Mild Pain (1 - 3), Moderate Pain (4 - 6)  ondansetron Injectable 4 milliGRAM(s) IV Push every 6 hours PRN Nausea and/or Vomiting  polyethylene glycol 3350 17 Gram(s) Oral every 12 hours PRN Constipation  zolpidem 5 milliGRAM(s) Oral at bedtime PRN Insomnia      CAPILLARY BLOOD GLUCOSE        I&O's Summary    10 Sep 2019 07:01  -  11 Sep 2019 07:00  --------------------------------------------------------  IN: 1080 mL / OUT: 1250 mL / NET: -170 mL        PHYSICAL EXAM:  Vital Signs Last 24 Hrs  T(C): 36.6 (11 Sep 2019 05:13), Max: 36.8 (10 Sep 2019 20:56)  T(F): 97.8 (11 Sep 2019 05:13), Max: 98.2 (10 Sep 2019 20:56)  HR: 88 (11 Sep 2019 05:13) (88 - 100)  BP: 130/62 (11 Sep 2019 05:45) (1/- - 130/62)  BP(mean): --  RR: 17 (11 Sep 2019 05:13) (17 - 18)  SpO2: 98% (11 Sep 2019 05:13) (95% - 98%)    PHYSICAL EXAM:  GENERAL: NAD  Eyes: Left eye conjunctival erythema improving  LUNG: Clear to auscultation bilaterally; No wheezes rales or rhonchi  HEART: S1, S2 rrr no murmurs  ABDOMEN: Soft, Nontender, Nondistended, Bowel sounds present, + hardware palpated in RLQ  EXTREMITIES: No leg edema cyanosis or clubbing  NEUROLOGY: AAOx3, communicative, moves arms, lower extremity contracted  SKIN: warm and dry        LABS:                        7.2    8.76  )-----------( 271      ( 11 Sep 2019 09:28 )             24.1     09-11    143  |  108  |  14  ----------------------------<  94  4.3   |  27  |  0.57    Ca    9.0      11 Sep 2019 07:28  Phos  3.0     09-10  Mg     2.0     09-10    TPro  5.4<L>  /  Alb  2.9<L>  /  TBili  0.2  /  DBili  x   /  AST  13  /  ALT  8<L>  /  AlkPhos  82  09-10          Urinalysis Basic - ( 09 Sep 2019 12:18 )    Color: Yellow / Appearance: Turbid / S.013 / pH: x  Gluc: x / Ketone: Negative  / Bili: Negative / Urobili: Negative   Blood: x / Protein: 30 mg/dL / Nitrite: Negative   Leuk Esterase: Large / RBC: 4 /hpf /  /HPF   Sq Epi: x / Non Sq Epi: 1 /hpf / Bacteria: Moderate      Care Discussed with Consultants/Other Providers:  CHRISTEN Pennington Patient is a 68y old  Female who presents with a chief complaint of Transfer from [facility] for UGIB (10 Sep 2019 15:24)      SUBJECTIVE / OVERNIGHT EVENTS:    feels ok. hitchcock catheter placed.   pill cam on    ROS:  14 point ROS negative in detail except stated as above    MEDICATIONS  (STANDING):  artificial tears (preservative free) Ophthalmic Solution 1 Drop(s) Left EYE every 2 hours  ascorbic acid 500 milliGRAM(s) Oral daily  atorvastatin 40 milliGRAM(s) Oral at bedtime  baclofen 20 milliGRAM(s) Oral four times a day  cefTRIAXone   IVPB 1000 milliGRAM(s) IV Intermittent every 24 hours  cefTRIAXone   IVPB      DULoxetine 30 milliGRAM(s) Oral two times a day  gabapentin 800 milliGRAM(s) Oral three times a day  influenza   Vaccine 0.5 milliLiter(s) IntraMuscular once  lidocaine   Patch 1 Patch Transdermal daily  lidocaine   Patch 1 Patch Transdermal daily  multivitamin 1 Tablet(s) Oral daily  oxyCODONE    IR 5 milliGRAM(s) Oral two times a day  oxyCODONE    IR 10 milliGRAM(s) Oral at bedtime  pantoprazole  Injectable 40 milliGRAM(s) IV Push two times a day  prednisoLONE acetate 1% Suspension 1 Drop(s) Left EYE daily  valACYclovir 1000 milliGRAM(s) Oral three times a day    MEDICATIONS  (PRN):  acetaminophen   Tablet .. 650 milliGRAM(s) Oral every 6 hours PRN Mild Pain (1 - 3), Moderate Pain (4 - 6)  ondansetron Injectable 4 milliGRAM(s) IV Push every 6 hours PRN Nausea and/or Vomiting  polyethylene glycol 3350 17 Gram(s) Oral every 12 hours PRN Constipation  zolpidem 5 milliGRAM(s) Oral at bedtime PRN Insomnia      CAPILLARY BLOOD GLUCOSE        I&O's Summary    10 Sep 2019 07:01  -  11 Sep 2019 07:00  --------------------------------------------------------  IN: 1080 mL / OUT: 1250 mL / NET: -170 mL        PHYSICAL EXAM:  Vital Signs Last 24 Hrs  T(C): 36.6 (11 Sep 2019 05:13), Max: 36.8 (10 Sep 2019 20:56)  T(F): 97.8 (11 Sep 2019 05:13), Max: 98.2 (10 Sep 2019 20:56)  HR: 88 (11 Sep 2019 05:13) (88 - 100)  BP: 130/62 (11 Sep 2019 05:45) (1/- - 130/62)  BP(mean): --  RR: 17 (11 Sep 2019 05:13) (17 - 18)  SpO2: 98% (11 Sep 2019 05:13) (95% - 98%)    PHYSICAL EXAM:  GENERAL: NAD  Eyes: Left eye conjunctival erythema improving  LUNG: Clear to auscultation bilaterally; No wheezes rales or rhonchi  HEART: S1, S2 rrr no murmurs  ABDOMEN: Soft, Nontender, Nondistended, Bowel sounds present, + hardware palpated in RLQ  EXTREMITIES: No leg edema cyanosis or clubbing  NEUROLOGY: AAOx3, communicative, moves arms, lower extremity contracted  SKIN: warm and dry        LABS:                        7.2    8.76  )-----------( 271      ( 11 Sep 2019 09:28 )             24.1     09-11    143  |  108  |  14  ----------------------------<  94  4.3   |  27  |  0.57    Ca    9.0      11 Sep 2019 07:28  Phos  3.0     09-10  Mg     2.0     09-10    TPro  5.4<L>  /  Alb  2.9<L>  /  TBili  0.2  /  DBili  x   /  AST  13  /  ALT  8<L>  /  AlkPhos  82  09-10          Urinalysis Basic - ( 09 Sep 2019 12:18 )    Color: Yellow / Appearance: Turbid / S.013 / pH: x  Gluc: x / Ketone: Negative  / Bili: Negative / Urobili: Negative   Blood: x / Protein: 30 mg/dL / Nitrite: Negative   Leuk Esterase: Large / RBC: 4 /hpf /  /HPF   Sq Epi: x / Non Sq Epi: 1 /hpf / Bacteria: Moderate    Surgical Pathology Report (19 @ 18:48)    Surgical Pathology Report:   ACCESSION No:  [id number]    BRENT MONSALVE                 1        Surgical Final Report          Final Diagnosis  Stomach, cardia, biopsy  - Gastric oxyntic mucosa withchronic inactive gastritis.  - Negative for Helicobacter microorganisms ([place]arthin-Starry  stain).  - Negative for intestinal metaplasia.    Verified by: Bianca Matias M.D.  (Electronic Signature)  Reported on: 09/10/19 17:24 EDT, [address] [place], [place] [id number]  _________________________________________________________________    Clinical History  gastritis    Specimen(s) Submitted  1     Gastric cardia biopsy    Gross Description  The specimen is received in formalin and the specimen container  is labeled: Gastric cardia.  It consists of one, soft, tan,  fragment of tissue measuring 0.7 cm in greatest dimension.  Special stains are requested. Entirely submitted.  One cassette.    In addition to other data that may appear on the specimen  container, the label has been inspected to confirm the presence  of thepatient's name and date of birth.    Vidhya Trujillo 2019 21:15      Care Discussed with Consultants/Other Providers:  CHRISTEN Pennington

## 2019-09-11 NOTE — CHART NOTE - NSCHARTNOTEFT_GEN_A_CORE
Called to see patient with paraplegia and neurogenic bladder transferred from [place] for GI bleed.  Also s/p L ureteral stent about one month ago for septic stone.    Per patient, she is straight catheterized every 4-6h.  While here she has been straight catheterized only periodically and not per her home regimen.  She had hitchcock placed last night for 850cc and it was left in.  The patient would like catheter removed and her normal regimen restarted. Patient sees Dr. Soto for management of neurogenic bladder    Plan:  Cont abx  F/u culture  Straight cath q4-6h and no less  No more bladder scan, she must stay on her current CIC regimen despite bladder scan readings  No intervention regarding L stone and stent at this time   Full consult to follow

## 2019-09-11 NOTE — PROGRESS NOTE ADULT - PROBLEM SELECTOR PLAN 8
U/A +  Ceftriaxone 9/9  follow up urine culture- gram neg rods  + urinary retention post straight cath- hitchcock cathter placed o/n

## 2019-09-11 NOTE — CHART NOTE - NSCHARTNOTEFT_GEN_A_CORE
68F with PMHx of aortic dissection (post-repair several years prior), spinal cord hematoma (now functionally paraplegic, she can move her left leg), chronic spasticity and pain (on PRN Oxycodone and has Baclofen pump), HTN, nephrolithiasis s/p left urethral stent and endotheliitis/keratitis (post-corneal transplant) transferred from [facility] after suffering UGIB. Patient has complicated medical history and recent medical course not full reflected on chart review, but collateral obtained from . Patient was at home in early August 2019 when  though she was not mentating well. He took her vitals at that time and found her to have a systolic BP in the 70s and HR in the 150s. When he brought her to [facility] they found her Hg to be 4.4. Unknown if she was having melena or hematochezia at that time. While she was there patient underwent four EGDs and received more than 10 units PRBC. EGDs were unable to identify active source of bleeding and usually showed pooled blood. There is suspicion for dieulafoy, but unable to be acted upon at [place] with the multiple EGDs at times only showing adherent clot. She was evaluated by surgery who recommended ex-lap given the severity, but patient's family deferred for now. Patient spent a majority of her time in [place] at the ICU. CT angiogram could not identify active source of bleeding with IR evaluation stating they could not provide a solution given the patient's previous dissection and collateral arterial formation. Patient's PMD is Dr. Branham who recommended transfer to [facility] for evaluation by Dr. Ambrocio.   Has a H/O dorsalgia, and has a Medtronic intrathecal infusion device n place. Is under the care of community pain management specialist Dr Vieira in [place].        ICU Vital Signs Last 24 Hrs  T(C): 38.1 (11 Sep 2019 18:48), Max: 38.1 (11 Sep 2019 18:48)  T(F): 100.5 (11 Sep 2019 18:48), Max: 100.5 (11 Sep 2019 18:48)  HR: 134 (11 Sep 2019 18:48) (88 - 134)  BP: 132/83 (11 Sep 2019 18:48) (1/- - 153/78)  RR: 18 (11 Sep 2019 18:48) (17 - 18)  SpO2: 98% (11 Sep 2019 18:48) (95% - 98%)      Recommendations  EKG -   Tylenol 650 MG po - standing order for pain 8/10  Stat CBC - 68F with PMHx of aortic dissection (post-repair several years prior), spinal cord hematoma (now functionally paraplegic, she can move her left leg), chronic spasticity and pain (on PRN Oxycodone and has Baclofen pump), HTN, nephrolithiasis s/p left urethral stent and endotheliitis/keratitis (post-corneal transplant) transferred from [facility] after suffering UGIB. Patient has complicated medical history and recent medical course not full reflected on chart review, but collateral obtained from . Patient was at home in early August 2019 when  though she was not mentating well. He took her vitals at that time and found her to have a systolic BP in the 70s and HR in the 150s. When he brought her to [facility] they found her Hg to be 4.4. Unknown if she was having melena or hematochezia at that time. While she was there patient underwent four EGDs and received more than 10 units PRBC. EGDs were unable to identify active source of bleeding and usually showed pooled blood. There is suspicion for dieulafoy, but unable to be acted upon at [place] with the multiple EGDs at times only showing adherent clot. She was evaluated by surgery who recommended ex-lap given the severity, but patient's family deferred for now. Patient spent a majority of her time in [place] at the ICU. CT angiogram could not identify active source of bleeding with IR evaluation stating they could not provide a solution given the patient's previous dissection and collateral arterial formation. Patient's PMD is Dr. Branham who recommended transfer to [facility] for evaluation by Dr. Ambrocio.   Has a H/O dorsalgia, and has a Medtronic intrathecal infusion device n place. Is under the care of community pain management specialist Dr Vieira in [place].        ICU Vital Signs Last 24 Hrs  T(C): 38.1 (11 Sep 2019 18:48), Max: 38.1 (11 Sep 2019 18:48)  T(F): 100.5 (11 Sep 2019 18:48), Max: 100.5 (11 Sep 2019 18:48)  HR: 134 (11 Sep 2019 18:48) (88 - 134)  BP: 132/83 (11 Sep 2019 18:48) (1/- - 153/78)  RR: 18 (11 Sep 2019 18:48) (17 - 18)  SpO2: 98% (11 Sep 2019 18:48) (95% - 98%)    Seen and examined patient Huband at bedside  Patient resting in bed no acute distress reports back pain back at 8/10 which feel better now "It was 100 before" Patient given Oxycodone with baclofen pump      reports that are many causes for her "high heart rates" such as  fever, pain, low blood count.      Recommendations:  EKG -   Tylenol 650 MG po - standing order for pain 8/10  Stat CBC -      Endorsed to Night shift     Will continue to monitor     Katheryn Sultana   Internal Medicine NP

## 2019-09-11 NOTE — PROGRESS NOTE ADULT - PROBLEM SELECTOR PLAN 1
-with acute blood loss anemia  -There was concern for questionable Dieulafoy lesion, most recent EGD on 8/30 revealed pooled blood in gastric fundus and adherent clot that could not be removed. CTA a/p could not localize bleed and per IR at OSH, celiac axis too tortuous for mesenteric angiogram. patient was also evaluated by surgery and there was discussion about possibility of exlap with gastrotomy which was deferred at this time.   -s/p EGD by Dr. Ambrocio on 9/5 which showed acute gastritis, gastric polyps with no source of GI bleed identified  -s/p enteroscopy 9/10- jejunum normal. small hiatal hernia. gastritis  -pill capsule endoscopy  -PPI IV BID  -Monitor H/H -with acute blood loss anemia  -There was concern for questionable Dieulafoy lesion, most recent EGD on 8/30 revealed pooled blood in gastric fundus and adherent clot that could not be removed. CTA a/p could not localize bleed and per IR at OSH, celiac axis too tortuous for mesenteric angiogram. patient was also evaluated by surgery and there was discussion about possibility of exlap with gastrotomy which was deferred at this time.   -s/p EGD by Dr. Ambrocio on 9/5 which showed acute gastritis, gastric polyps with no source of GI bleed identified  -s/p enteroscopy 9/10- jejunum normal. small hiatal hernia. gastritis neg for h pylori  -pill capsule endoscopy  -PPI IV BID  -Monitor H/H

## 2019-09-12 NOTE — CONSULT NOTE ADULT - SUBJECTIVE AND OBJECTIVE BOX
Urology Consult Note    Chief Complaint:  UTI, cystitis, urinary retention, urolithiasis    History of Present Illness:  68yF patient of Dr. Soto with history of UTI and chronic cystitis. She also has h/o urolithiasis with placement of ureteral stent. Also prior history of urinary retention. Appears comfortable this morning. Resting comfortably. Denies renal colic, hematuria. Also h/o spina hematoma with parapalegia.    PAST MEDICAL & SURGICAL HISTORY:  Dorsalgia of lumbar region: on pain medication /baclofen po and pump  Self-catheterizes urinary bladder  Anemia: chronic  Uveitis  Osteoporosis  PAD (peripheral artery disease)  Hematoma: spinal  September  treated  September 2018  Paraplegia: on wheelchair goes to physical therapy 2 x weekly  Aortic dissection, thoracic: Type A Repaired 2009  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2018  UTI (urinary tract infection): stable x 3 months  TIA (transient ischemic attack)  HTN (Hypertension): on meds  History of corneal transplant: left corneal transplant on 5/21/2018  Disorder of spine: unthetethering 2 x  Presence of IVC filter: 2014 ?  S/P aortic dissection repair: Type A Dissection repair /2009   descending aortic aneurysm repair 9/2016  H/O Spinal surgery: laminectomies 2014      FAMILY HISTORY:  Family history of Alzheimer's disease      Allergies    No Known Allergies    Intolerances        Social History:  Denies tobacco or alcohol use    Review of Systems:   Constitutional: No weight loss, no weakness  HEENT: No visual loss, no hearing loss, no sneezing  Skin: No rash or itching  CV: No chest pain, no chest pressure  Pulm: No shortness of breath, cough, or sputum  GI: No melena, no constipation  : Per HPI  Neuro: No headache, dizziness  MSK: No muscle pain, no joint pain  Heme: No anemia, bruising, or bleeding  Lymphatics: No enlarged nodes, no history of splenectomy  Psych: No depression of anxiety  Endo: No cold or heat intolerance  Allergies: No asthma, hives    Physical Exam:  Vital signs  T(C): 36.7 (09-12-19 @ 04:14), Max: 38.1 (09-11-19 @ 18:48)  HR: 101 (09-12-19 @ 04:14)  BP: 131/78 (09-12-19 @ 04:14)  SpO2: 96% (09-12-19 @ 04:14)  Wt(kg): --    Gen: No acute distress. Normal mood  HEENT: Normocephalic, neck supple  CV: Hemodynamically stable  Pulm: No increased work of breathing  Abd: soft, non-tender, non-distended  Back: No CVA tenderness, no midline pain  Extremities: No significant deformity or joint abnormality  Neuro: Alert & oriented x3, No focal deficits  Skin: Skin normal color, normal texture  Psych: Normal affect, normal behavior  : Nonpalpable bladder      Labs:      09-12 @ 06:51    WBC --    / Hct --    / SCr 0.61     09-11 @ 20:20    WBC 14.9  / Hct 22.0  / SCr --       09-12    140  |  105  |  28<H>  ----------------------------<  110<H>  4.0   |  26  |  0.61    Ca    8.6      12 Sep 2019 06:51    TPro  5.3<L>  /  Alb  2.8<L>  /  TBili  0.4  /  DBili  x   /  AST  11  /  ALT  6<L>  /  AlkPhos  80  09-12

## 2019-09-12 NOTE — PROGRESS NOTE ADULT - SUBJECTIVE AND OBJECTIVE BOX
Chief Complaint:  Patient is a 68y old  Female who presents with a chief complaint of Transfer from [facility] for UGIB.    HPI:  68F with PMHx of aortic dissection (post-repair several years prior), spinal cord hematoma (now functionally paraplegic, she can move her left leg), chronic spasticity and pain (on PRN Oxycodone and has Baclofen pump), HTN, nephrolithiasis s/p left urethral stent and endotheliitis/keratitis (post-corneal transplant) transferred from [facility] after suffering UGIB. Patient has complicated medical history and recent medical course not full reflected on chart review, but collateral obtained from . Patient was at home in early August 2019 when  though she was not mentating well. He took her vitals at that time and found her to have a systolic BP in the 70s and HR in the 150s. When he brought her to [facility] they found her Hg to be 4.4. Unknown if she was having melena or hematochezia at that time. While she was there patient underwent four EGDs and received more than 10 units PRBC. EGDs were unable to identify active source of bleeding and usually showed pooled blood. There is suspicion for dieulafoy, but unable to be acted upon at [place] with the multiple EGDs at times only showing adherent clot. She was evaluated by surgery who recommended ex-lap given the severity, but patient's family deferred for now. Patient spent a majority of her time in [place] at the ICU. CT angiogram could not identify active source of bleeding with IR evaluation stating they could not provide a solution given the patient's previous dissection and collateral arterial formation. Patient's PMD is Dr. Branham who recommended transfer to [facility] for evaluation by Dr. Ambrocio.   Has a H/O dorsalgia, and has a Medtronic intrathecal infusion device in place. Is under the care of community pain management specialist Dr Vieira in [place].    Current in-patient pain therapy:  MEDICATIONS  (STANDING):  artificial tears (preservative free) Ophthalmic Solution 1 Drop(s) Left EYE every 2 hours  ascorbic acid 500 milliGRAM(s) Oral daily  atorvastatin 40 milliGRAM(s) Oral at bedtime  baclofen 20 milliGRAM(s) Oral four times a day  cefTRIAXone   IVPB 1000 milliGRAM(s) IV Intermittent every 24 hours  cefTRIAXone   IVPB      DULoxetine 30 milliGRAM(s) Oral two times a day  gabapentin 800 milliGRAM(s) Oral three times a day  influenza   Vaccine 0.5 milliLiter(s) IntraMuscular once  lidocaine   Patch 1 Patch Transdermal daily  lidocaine   Patch 1 Patch Transdermal daily  multivitamin 1 Tablet(s) Oral daily  oxyCODONE    IR 5 milliGRAM(s) Oral two times a day  oxyCODONE    IR 10 milliGRAM(s) Oral at bedtime  pantoprazole    Tablet 40 milliGRAM(s) Oral two times a day  prednisoLONE acetate 1% Suspension 1 Drop(s) Left EYE four times a day  valACYclovir 1000 milliGRAM(s) Oral three times a day    MEDICATIONS  (PRN):  acetaminophen   Tablet .. 650 milliGRAM(s) Oral every 6 hours PRN Mild Pain (1 - 3), Moderate Pain (4 - 6)  ondansetron Injectable 4 milliGRAM(s) IV Push every 6 hours PRN Nausea and/or Vomiting  polyethylene glycol 3350 17 Gram(s) Oral every 12 hours PRN Constipation  zolpidem 5 milliGRAM(s) Oral at bedtime PRN Insomnia    PHYSICAL EXAM:  Seen at bedside,  present, exam unchanged.  Still complains of severe pain in right hip radiating down the lateral right thigh stopping at the knee.    T(C): 37.2 (09-12-19 @ 14:39)  HR: 107 (09-12-19 @ 14:39)  BP: 125/56 (09-12-19 @ 14:39)  RR: 18 (09-12-19 @ 14:39)  SpO2: 94% (09-12-19 @ 14:39)    Pertinent labs, radiology, additional studies:  Hematology and chemistry reviewed.

## 2019-09-12 NOTE — PROGRESS NOTE ADULT - PROBLEM SELECTOR PLAN 7
-Continue with multi-modal pain management: Tylenol mild-mod pain, Morphine 2 q4h PRN severe pain, Baclofen 20 mg TID, Duloxetine 20 mg BID, and Gabapentin increased to 800 TID  -Patient has baclofen pump - due for refill 9/22  - oxycodone 5mg IR bid, oxycodone 10mg qhs, gabapentin increased to 800mg tid

## 2019-09-12 NOTE — CHART NOTE - NSCHARTNOTEFT_GEN_A_CORE
Nutrition Follow Up Note    Patient seen for nutrition follow up. Chart reviewed, events noted.     Per chart, pt is a 68 year old female with PMHx of aortic dissection (post-repair several years prior), spinal cord hematoma (now functionally paraplegic), chronic spasticity and pain (on Oxycodone and Baclofen pump), HTN, nephrolithiasis s/p left urethral stent, UTIs and endotheliitis/keratitis (post-corneal transplant). Noted with recent admit to OSH for UGIB with acute blood loss anemia requiring multiple PRBC transfusions. Transferred to [facility] for further evaluation. On 9/5, s/p repeat EGD finding acute gastritis, gastric polyps with no source of GI bleed identified. On 9/10, s/p enteroscopy; findings as follows: jejunum normal, small hiatal hernia, gastritis neg for h pylori. On 9/11, s/p video capsule study; results pending.    Source: Comprehensive chart review     Diet : Regular    Patient reports fair appetite and intake. Reports consuming % of meals provided in-house. States in-house meals are being supplemented with food brought in from home. Reports does not like and is not consuming Mighty Shakes and requests for RD to discontinue. Reports is tolerating current diet consistency. No reports of nausea/vomiting, diarrhea/constipation or other acute GI distress at this time. Per chart, last BM today.     PO intake : 50-75%     Source for PO intake: patient,  at bedside     Enteral /Parenteral Nutrition: n/a    Daily Weight - no weight in chart at this time    Pertinent Medications: MEDICATIONS  (STANDING):  artificial tears (preservative free) Ophthalmic Solution 1 Drop(s) Left EYE every 2 hours  ascorbic acid 500 milliGRAM(s) Oral daily  atorvastatin 40 milliGRAM(s) Oral at bedtime  baclofen 20 milliGRAM(s) Oral four times a day  cefTRIAXone   IVPB 1000 milliGRAM(s) IV Intermittent every 24 hours  cefTRIAXone   IVPB      DULoxetine 30 milliGRAM(s) Oral two times a day  gabapentin 800 milliGRAM(s) Oral three times a day  influenza   Vaccine 0.5 milliLiter(s) IntraMuscular once  lidocaine   Patch 1 Patch Transdermal daily  lidocaine   Patch 1 Patch Transdermal daily  multivitamin 1 Tablet(s) Oral daily  oxyCODONE    IR 5 milliGRAM(s) Oral two times a day  oxyCODONE    IR 10 milliGRAM(s) Oral at bedtime  pantoprazole    Tablet 40 milliGRAM(s) Oral two times a day  prednisoLONE acetate 1% Suspension 1 Drop(s) Left EYE four times a day  valACYclovir 1000 milliGRAM(s) Oral three times a day    MEDICATIONS  (PRN):  acetaminophen   Tablet .. 650 milliGRAM(s) Oral every 6 hours PRN Mild Pain (1 - 3), Moderate Pain (4 - 6)  ondansetron Injectable 4 milliGRAM(s) IV Push every 6 hours PRN Nausea and/or Vomiting  polyethylene glycol 3350 17 Gram(s) Oral every 12 hours PRN Constipation  zolpidem 5 milliGRAM(s) Oral at bedtime PRN Insomnia    Pertinent Labs: 09-12 @ 06:51: Na 140, BUN 28<H>, Cr 0.61, <H>, K+ 4.0, Phos --, Mg --, Alk Phos 80, ALT/SGPT 6<L>, AST/SGOT 11    Skin per nursing documentation: stage 2 pressure injury to sacrum  Edema per chart: none at this time    Estimated Needs:   [x ] no change since previous assessment  [ ] recalculated:     Previous Nutrition Diagnosis #1: Inadequate Oral Intake  Nutrition Diagnosis is: ongoing, appetite improving daily per report    Previous Nutrition Diagnosis #2: Increased Nutrient Needs  Nutrition Diagnosis is: ongoing, to be addressed with provision of oral nutrition supplements    New Nutrition Diagnosis: n/a     Interventions: Reinforced increased nutrient needs and energy- and protein-dense options available for pt on menu. Pt requesting addition protein supplements to meet needs/promote wound healing; however declined ProSource, Mighty Shakes, and Ensure Enlive. Amendable to receive Benny x2. RD discussed components of Benny supplement and reviewed how to administer.     Recommend  1) Continue current diet order of Regular  2) Add Benny x2 to provide conditionally essential amino acids (arginine, glutamine) to promote wound healing of pressure injuries  3) Encourage PO intake, obtain/honor preferences as able, provide feeding assistance as able    Monitoring and Evaluation:     Continue to monitor Nutritional intake, Tolerance to diet prescription, weights, labs, skin integrity    RD remains available upon request and will follow up per protocol    Augusta Hernandez RD, CDN    Pager# [phone] Nutrition Follow Up Note    Patient seen for nutrition follow up. Chart reviewed, events noted.     Per chart, pt is a 68 year old female with PMHx of aortic dissection (post-repair several years prior), spinal cord hematoma (now functionally paraplegic), chronic spasticity and pain (on Oxycodone and Baclofen pump), HTN, nephrolithiasis s/p left urethral stent, UTIs and endotheliitis/keratitis (post-corneal transplant). Noted with recent admit to OSH for UGIB with acute blood loss anemia requiring multiple PRBC transfusions. Transferred to [facility] for further evaluation. On 9/5, s/p repeat EGD finding acute gastritis, gastric polyps with no source of GI bleed identified. On 9/10, s/p enteroscopy; findings as follows: jejunum normal, small hiatal hernia, gastritis neg for h pylori. On 9/11, s/p video capsule study; results pending.    Source: Comprehensive chart review     Diet : Regular    Patient reports fair appetite and intake. Reports consuming % of meals provided in-house. States in-house meals are being supplemented with food brought in from home. Reports does not like and is not consuming Mighty Shakes and requests for RD to discontinue. Reports is tolerating current diet consistency. No reports of nausea/vomiting, diarrhea/constipation or other acute GI distress at this time. Per chart, last BM today.     PO intake : 50-75%     Source for PO intake: patient,  at bedside     Enteral /Parenteral Nutrition: n/a    Daily Weight - no weight in chart at this time    Pertinent Medications: MEDICATIONS  (STANDING):  artificial tears (preservative free) Ophthalmic Solution 1 Drop(s) Left EYE every 2 hours  ascorbic acid 500 milliGRAM(s) Oral daily  atorvastatin 40 milliGRAM(s) Oral at bedtime  baclofen 20 milliGRAM(s) Oral four times a day  cefTRIAXone   IVPB 1000 milliGRAM(s) IV Intermittent every 24 hours  cefTRIAXone   IVPB      DULoxetine 30 milliGRAM(s) Oral two times a day  gabapentin 800 milliGRAM(s) Oral three times a day  influenza   Vaccine 0.5 milliLiter(s) IntraMuscular once  lidocaine   Patch 1 Patch Transdermal daily  lidocaine   Patch 1 Patch Transdermal daily  multivitamin 1 Tablet(s) Oral daily  oxyCODONE    IR 5 milliGRAM(s) Oral two times a day  oxyCODONE    IR 10 milliGRAM(s) Oral at bedtime  pantoprazole    Tablet 40 milliGRAM(s) Oral two times a day  prednisoLONE acetate 1% Suspension 1 Drop(s) Left EYE four times a day  valACYclovir 1000 milliGRAM(s) Oral three times a day    MEDICATIONS  (PRN):  acetaminophen   Tablet .. 650 milliGRAM(s) Oral every 6 hours PRN Mild Pain (1 - 3), Moderate Pain (4 - 6)  ondansetron Injectable 4 milliGRAM(s) IV Push every 6 hours PRN Nausea and/or Vomiting  polyethylene glycol 3350 17 Gram(s) Oral every 12 hours PRN Constipation  zolpidem 5 milliGRAM(s) Oral at bedtime PRN Insomnia    Pertinent Labs: 09-12 @ 06:51: Na 140, BUN 28<H>, Cr 0.61, <H>, K+ 4.0, Phos --, Mg --, Alk Phos 80, ALT/SGPT 6<L>, AST/SGOT 11    Skin per nursing documentation: stage 2 pressure injury to sacrum  Edema per chart: none at this time    Estimated Needs:   [x ] no change since previous assessment  [ ] recalculated:     Previous Nutrition Diagnosis #1: Inadequate Oral Intake  Nutrition Diagnosis is: ongoing, appetite improving daily per report    Previous Nutrition Diagnosis #2: Increased Nutrient Needs  Nutrition Diagnosis is: ongoing, to be addressed with provision of oral nutrition supplements    New Nutrition Diagnosis: n/a     Interventions: Reinforced increased nutrient needs and energy- and protein-dense options available for pt on menu. Pt requesting addition protein supplements to meet needs/promote wound healing; however declined ProSource, Mighty Shakes, and Ensure Enlive. Amendable to receive Benny x2. RD discussed components of Benny supplement and reviewed how to administer.     Recommend  1) Continue current diet order of Regular  2) Add Benny x2 to provide conditionally essential amino acids (arginine, glutamine) to promote wound healing of pressure injuries  3) Encourage PO intake, obtain/honor preferences as able, provide feeding assistance as able  4) Continue micronutrient supplementation    Monitoring and Evaluation:     Continue to monitor Nutritional intake, Tolerance to diet prescription, weights, labs, skin integrity    RD remains available upon request and will follow up per protocol    Augusta Hernandez RD, CDN    Pager# [phone]

## 2019-09-12 NOTE — PROGRESS NOTE ADULT - SUBJECTIVE AND OBJECTIVE BOX
Patient is a 68y old  Female who presents with a chief complaint of Transfer from [facility] for UGIB (12 Sep 2019 08:14)      SUBJECTIVE / OVERNIGHT EVENTS:    feels well. still trembling. concerned about her blood counts. right thigh pain slowly improving.    ROS:  14 point ROS negative in detail except stated as above    MEDICATIONS  (STANDING):  artificial tears (preservative free) Ophthalmic Solution 1 Drop(s) Left EYE every 2 hours  ascorbic acid 500 milliGRAM(s) Oral daily  atorvastatin 40 milliGRAM(s) Oral at bedtime  baclofen 20 milliGRAM(s) Oral four times a day  cefTRIAXone   IVPB 1000 milliGRAM(s) IV Intermittent every 24 hours  cefTRIAXone   IVPB      DULoxetine 30 milliGRAM(s) Oral two times a day  gabapentin 800 milliGRAM(s) Oral three times a day  influenza   Vaccine 0.5 milliLiter(s) IntraMuscular once  lidocaine   Patch 1 Patch Transdermal daily  lidocaine   Patch 1 Patch Transdermal daily  multivitamin 1 Tablet(s) Oral daily  oxyCODONE    IR 5 milliGRAM(s) Oral two times a day  oxyCODONE    IR 10 milliGRAM(s) Oral at bedtime  pantoprazole  Injectable 40 milliGRAM(s) IV Push two times a day  prednisoLONE acetate 1% Suspension 1 Drop(s) Left EYE daily  valACYclovir 1000 milliGRAM(s) Oral three times a day    MEDICATIONS  (PRN):  acetaminophen   Tablet .. 650 milliGRAM(s) Oral every 6 hours PRN Mild Pain (1 - 3), Moderate Pain (4 - 6)  ondansetron Injectable 4 milliGRAM(s) IV Push every 6 hours PRN Nausea and/or Vomiting  polyethylene glycol 3350 17 Gram(s) Oral every 12 hours PRN Constipation  zolpidem 5 milliGRAM(s) Oral at bedtime PRN Insomnia      CAPILLARY BLOOD GLUCOSE        I&O's Summary    11 Sep 2019 07:01  -  12 Sep 2019 07:00  --------------------------------------------------------  IN: 700 mL / OUT: 1100 mL / NET: -400 mL    12 Sep 2019 07:01  -  12 Sep 2019 11:57  --------------------------------------------------------  IN: 240 mL / OUT: 400 mL / NET: -160 mL        PHYSICAL EXAM:  Vital Signs Last 24 Hrs  T(C): 36.6 (12 Sep 2019 08:51), Max: 38.1 (11 Sep 2019 18:48)  T(F): 97.9 (12 Sep 2019 08:51), Max: 100.5 (11 Sep 2019 18:48)  HR: 82 (12 Sep 2019 08:51) (82 - 134)  BP: 114/75 (12 Sep 2019 08:51) (106/62 - 153/78)  BP(mean): --  RR: 18 (12 Sep 2019 08:51) (18 - 18)  SpO2: 97% (12 Sep 2019 08:51) (94% - 98%)    PHYSICAL EXAM:  GENERAL: NAD  LUNG: Clear to auscultation bilaterally; No wheezes rales or rhonchi  HEART: S1, S2 rrr no murmurs  ABDOMEN: Soft, Nontender, Nondistended, Bowel sounds present, + hardware palpated in RLQ  EXTREMITIES: No leg edema cyanosis or clubbing  NEUROLOGY: AAOx3, communicative, moves arms, lower extremity contracted  SKIN: warm and dry        LABS:                        7.1    11.68 )-----------( 267      ( 12 Sep 2019 08:42 )             23.0     09-12    140  |  105  |  28<H>  ----------------------------<  110<H>  4.0   |  26  |  0.61    Ca    8.6      12 Sep 2019 06:51    TPro  5.3<L>  /  Alb  2.8<L>  /  TBili  0.4  /  DBili  x   /  AST  11  /  ALT  6<L>  /  AlkPhos  80  09-12              RADIOLOGY & ADDITIONAL TESTS:  < from: US Kidney and Bladder (09.12.19 @ 11:13) >  FINDINGS:    Right kidney:  11.9 cm. No renal mass, hydronephrosis or calculi. Simple   cortical cyst interpolar region measures 0.7 cm.    Left kidney:  8.6 cm. Diffuse cortical thinning. No hydronephrosis.    Urinary bladder: Distal portion of left ureteral stent visualized within   the bladder lumen. Dependent debris within the bladder lumen.  Prevoid bladder volume 92 cc. Patient unable to void.    IMPRESSION:     No hydronephrosis.    Moderate urinary retention, patient unable to void.    < end of copied text >        Consultant(s) Notes Reviewed:    optho, gi  Care Discussed with Consultants/Other Providers:  ANEUDY Freeman

## 2019-09-12 NOTE — PROGRESS NOTE ADULT - PROBLEM SELECTOR PLAN 6
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

## 2019-09-12 NOTE — PHARMACOTHERAPY INTERVENTION NOTE - COMMENTS
Pantoprazole 40mg IV Q12H was switched to 40mg PO q12H per IV/PO conversion policy.    Nathan Mueller, PharmD  [phone]

## 2019-09-12 NOTE — PROGRESS NOTE ADULT - ASSESSMENT
Patient is a 68y old  Female with an UGIB, h/o chronic spine pain from dorsalgia.     Continue oxycodone, gabapentin, duloxetine, lioresal, and lidoderm.    [employer]tr[employer] 40 cc intrathecal pump contains:  Lioresal 2000 mcg/ ml at 480 mcg/ day  Morphine 10 mg/ ml at 2.4 mg/ day  Pump will alarm low reservoir volume on 9/22/2019.  At the time of alarm she will have 2 cc remaining in the pump reservoir.    Continue oxycodone, gabapentin, duloxetine, and lidoderm.    Has a spinal cord stimulator in place, not currently on.    Spoke with  at length about other options for pain, possible outpatient injections with Dr Vieira.  The patient's right thigh pain is in the pattern of a meralgia paresthetica.   Could benefit from a peripheral nerve block.    Minutes spent on encounter:  25 minutes.      [facility]  [phone]

## 2019-09-12 NOTE — CONSULT NOTE ADULT - ASSESSMENT
68yF with urolithiasis, h/o stent placement, UTI, urinary retention, pt of Dr. Soto.    -F/u cultures  -Abx per primary, ID input  -Hydration  -Pain control  -KUB and renal bladder US to assess for stones, stent, and urinary retention  -Bladder scan at bedside, if elevated >350cc, consider straight cathing  -Will need outpatient urology follow up with Dr. Soto for UTIs and retention

## 2019-09-12 NOTE — PROGRESS NOTE ADULT - PROBLEM SELECTOR PLAN 1
-with acute blood loss anemia  -There was concern for questionable Dieulafoy lesion, most recent EGD on 8/30 revealed pooled blood in gastric fundus and adherent clot that could not be removed. CTA a/p could not localize bleed and per IR at OSH, celiac axis too tortuous for mesenteric angiogram. patient was also evaluated by surgery and there was discussion about possibility of exlap with gastrotomy which was deferred at this time.   -s/p EGD by Dr. Ambrocio on 9/5 which showed acute gastritis, gastric polyps with no source of GI bleed identified  -s/p enteroscopy 9/10- jejunum normal. small hiatal hernia. gastritis neg for h pylori  -pill capsule endoscopy results pending  - s/p 1 unit pRBC 9/11--> HB 6.9-->7.1, and BUN trending up concern for ongoing bleeding  -PPI IV BID  -Monitor H/H

## 2019-09-12 NOTE — PROGRESS NOTE ADULT - PROBLEM SELECTOR PLAN 3
-secondary to spinal hematoma  -straight cath (patient has chronic urinary retention and is cathed by her  at home) q4hr  -Miralax for chronic stool retention (does periodic manual disimpactions at home)  -renal/bladder US

## 2019-09-12 NOTE — PROGRESS NOTE ADULT - PROBLEM SELECTOR PLAN 8
U/A +  Ceftriaxone 9/9  follow up urine culture- gram neg rods  + urinary retention post straight cath- hitchcock cathter placed o/n U/A +  with sepsis, as fever of 100.5 and tachycardia 134 o/n  BCX 9/11 will follow up  Ceftriaxone 9/9  follow up urine culture- gram neg rods  + urinary retention post straight cath- hitchcock cathter placed o/n

## 2019-09-12 NOTE — PROGRESS NOTE ADULT - ASSESSMENT
69 y/o F with PMHx of aortic dissection (post-repair several years prior), spinal cord hematoma (now functionally paraplegic), chronic spasticity and pain (on Oxycodone and Baclofen pump), HTN, nephrolithiasis s/p left urethral stent, UTIs and endotheliitis/keratitis (post-corneal transplant). Patient had recent hospitalizations at [facility] for UGIB with acute blood loss anemia requiring multiple PRBC transfusions. Work up there included CTA a/p which was negative and unable to localize source of bleed, s/p multiple EGD which showed pooled blood and/or adherent clot in gastric fundus that could not be removed. Patient transferred here for further management and evaluation by Dr. Ambrocio. Patient is s/p repeat EGD on 9/5 that showed acute gastritis, gastric polyps with no source of GI bleed identified.

## 2019-09-13 NOTE — CONSULT NOTE ADULT - SUBJECTIVE AND OBJECTIVE BOX
HPI:  68F with PMHx of aortic dissection (post-repair several years prior), spinal cord hematoma (now functionally paraplegic, she can move her left leg), chronic spasticity and pain (on PRN Oxycodone and has Baclofen pump), HTN, nephrolithiasis s/p left urethral stent and endotheliitis/keratitis (post-corneal transplant) transferred from [facility] after suffering UGIB. Patient has complicated medical history and recent medical course not full reflected on chart review, but collateral obtained from . Patient was at home in early August 2019 when  though she was not mentating well. He took her vitals at that time and found her to have a systolic BP in the 70s and HR in the 150s. When he brought her to [facility] they found her Hg to be 4.4. Unknown if she was having melena or hematochezia at that time. While she was there patient underwent four EGDs and received more than 10 units PRBC. EGDs were unable to identify active source of bleeding and usually showed pooled blood. There is suspicion for dieulafoy, but unable to be acted upon at [place] with the multiple EGDs at times only showing adherent clot. She was evaluated by surgery who recommended ex-lap given the severity, but patient's family deferred for now. Patient spent a majority of her time in [place] at the ICU. CT angiogram could not identify active source of bleeding with IR evaluation stating they could not provide a solution given the patient's previous dissection and collateral arterial formation. Patient's PMD is Dr. Branham who recommended transfer to [facility] for evaluation by Dr. Ambrocio. When seen by me patient had no active complaints. She states her Baclofen pump is empty. She denies any hematochezia or melena. She is slightly fretful given the recent medical events and is hopeful that a solution can be found. (04 Sep 2019 20:17)      PAST MEDICAL & SURGICAL HISTORY:  Dorsalgia of lumbar region: on pain medication /baclofen po and pump  Self-catheterizes urinary bladder  Anemia: chronic  Uveitis  Osteoporosis  PAD (peripheral artery disease)  Hematoma: spinal  September  treated  September 2018  Paraplegia: on wheelchair goes to physical therapy 2 x weekly  Aortic dissection, thoracic: Type A Repaired 2009  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2018  UTI (urinary tract infection): stable x 3 months  TIA (transient ischemic attack)  HTN (Hypertension): on meds  History of corneal transplant: left corneal transplant on 5/21/2018  Disorder of spine: unthetethering 2 x  Presence of IVC filter: 2014 ?  S/P aortic dissection repair: Type A Dissection repair /2009   descending aortic aneurysm repair 9/2016  H/O Spinal surgery: laminectomies 2014      Allergies    No Known Allergies    Intolerances        ANTIMICROBIALS:  meropenem  IVPB    meropenem  IVPB 1000 every 8 hours  valACYclovir 1000 three times a day      OTHER MEDS:  acetaminophen   Tablet .. 650 milliGRAM(s) Oral every 6 hours PRN  artificial tears (preservative free) Ophthalmic Solution 1 Drop(s) Left EYE every 2 hours  ascorbic acid 500 milliGRAM(s) Oral daily  atorvastatin 40 milliGRAM(s) Oral at bedtime  baclofen 20 milliGRAM(s) Oral four times a day  DULoxetine 30 milliGRAM(s) Oral two times a day  gabapentin 800 milliGRAM(s) Oral three times a day  influenza   Vaccine 0.5 milliLiter(s) IntraMuscular once  lidocaine   Patch 1 Patch Transdermal daily  lidocaine   Patch 1 Patch Transdermal daily  multivitamin 1 Tablet(s) Oral daily  ondansetron Injectable 4 milliGRAM(s) IV Push every 6 hours PRN  oxyCODONE    IR 5 milliGRAM(s) Oral two times a day  oxyCODONE    IR 10 milliGRAM(s) Oral at bedtime  pantoprazole    Tablet 40 milliGRAM(s) Oral two times a day  polyethylene glycol 3350 17 Gram(s) Oral every 12 hours PRN  prednisoLONE acetate 1% Suspension 1 Drop(s) Left EYE four times a day      SOCIAL HISTORY:    Marital Status:    Occupation:   Lives with:     Substance Use (street drugs):   Tobacco Usage:    Alcohol Usage: Social EtOH    FAMILY HISTORY:  Family history of Alzheimer's disease      ROS:  Unobtainable because:   All other systems negative     Constitutional: no fever, no chills, no weight loss, no night sweats  Eye: no eye pain, no redness, no vision changes  ENT:  no sore throat, no rhinorrhea  Cardiovascular:  no chest pain, no palpitation  Respiratory:  no SOB, no cough  GI:  no abd pain, no vomiting, no diarrhea  urinary: no dysuria, no hematuria, no flank pain  : no  discharge or bleeding  musculoskeletal:  no joint pain, no joint swelling  skin:  no rash  neurology:  no headache, no seizure, no change in mental status  psych: no anxiety, no depression     Physical Exam:    General:    NAD, non toxic  Head: atraumatic, normocephalic  Eyes: normal sclera and conjunctiva  ENT:   no oropharyngeal lesions, no LAD, neck supple  Cardio:    regular S1,S2, no murmur  Respiratory:   clear b/l, no wheezing  abd:   soft, BS +, not tender, no hepatosplenomegaly  :     no CVAT, no suprapubic tenderness, no hitchcock  Musculoskeletal : no joint swelling, no edema  Skin:    no rash  vascular: no lines, normal pulses  Neurologic:     no focal deficits  psych: normal affect, no suicidal ideation      Drug Dosing Weight  Height (cm): 170.2 (04 Sep 2019 19:28)  Weight (kg): 52 (29 Aug 2019 11:21)  BMI (kg/m2): 18 (04 Sep 2019 19:28)  BSA (m2): 1.6 (04 Sep 2019 19:28)    Vital Signs Last 24 Hrs  T(F): 98.8 (09-13-19 @ 10:41), Max: 100.5 (09-11-19 @ 18:48)    Vital Signs Last 24 Hrs  HR: 100 (09-13-19 @ 10:41) (86 - 107)  BP: 116/65 (09-13-19 @ 10:41) (116/65 - 127/78)  RR: 18 (09-13-19 @ 10:41)  SpO2: 95% (09-13-19 @ 10:41) (94% - 98%)  Wt(kg): --                          7.3    9.3   )-----------( 256      ( 13 Sep 2019 06:50 )             23.2       09-13    141  |  106  |  16  ----------------------------<  105<H>  4.1   |  25  |  0.53    Ca    8.8      13 Sep 2019 06:48    TPro  5.3<L>  /  Alb  2.8<L>  /  TBili  0.4  /  DBili  x   /  AST  11  /  ALT  6<L>  /  AlkPhos  80  09-12          MICROBIOLOGY:  v  .Blood  09-12-19   No growth to date.  --  --      .Urine  09-09-19   >100,000 CFU/ml Klebsiella pneumoniae ESBL  >100,000 CFU/ml Pseudomonas aeruginosa  --  Klebsiella pneumoniae ESBL  Pseudomonas aeruginosa      .Urine Clean Catch (Midstream)  08-28-19   10,000 - 49,000 CFU/mL Presumptive Candida albicans  --  --      .Blood None  08-28-19   No growth at 5 days.  --  --      .Blood None  08-28-19   No growth at 5 days.  --  --      .Urine Catheterized  08-16-19   No growth  --  --                  RADIOLOGY: HPI: 68F with nephrolithiasis s/p left ureteral stent and endotheliitis/keratitis (post-corneal transplant), hx aortic dissection (post-repair several years prior), spinal cord hematoma (now functionally paraplegic, she can move her left leg), chronic spasticity and pain (on PRN Oxycodone and has Baclofen pump), HTN transferred from [facility] for UGIB. Had multiple EGDs in [facility] w no source found. Had CTA a/p negative for GIB. Transferred to [facility] for further care. On 9/5 had EGD w gastritis, two clips placed though low likelihood that is source of bleeding. On 9/9 had enteroscopy w gastritis seen. On 9/10 had pill capsule study, results per RN were positive. Pt to go for urgent EGD today.      Pt currently c/o chronic back pain that isn't controlled. Denies cough, rhinorrhea, sore throat, cp, abd pain, n/v. Had some suprapubic discomfort. No dysuria. Has hx nephrolithiasis w L ureteral stent placed- pt doesn't know when stent was placed.     C/o L eye pain- seen by ophtho w concern of keratopathy 2/2 ?failing corneal transplant.     PAST MEDICAL & SURGICAL HISTORY:  Dorsalgia of lumbar region: on pain medication /baclofen po and pump  Self-catheterizes urinary bladder  Anemia: chronic  Uveitis  Osteoporosis  PAD (peripheral artery disease)  Hematoma: spinal  September  treated  September 2018  Paraplegia: on wheelchair goes to physical therapy 2 x weekly  Aortic dissection, thoracic: Type A Repaired 2009  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2018  UTI (urinary tract infection): stable x 3 months  TIA (transient ischemic attack)  HTN (Hypertension): on meds  History of corneal transplant: left corneal transplant on 5/21/2018  Disorder of spine: unthetethering 2 x  Presence of IVC filter: 2014 ?  S/P aortic dissection repair: Type A Dissection repair /2009   descending aortic aneurysm repair 9/2016  H/O Spinal surgery: laminectomies 2014      Allergies    No Known Allergies    Intolerances        ANTIMICROBIALS:  meropenem  IVPB    meropenem  IVPB 1000 every 8 hours  valACYclovir 1000 three times a day      OTHER MEDS:  acetaminophen   Tablet .. 650 milliGRAM(s) Oral every 6 hours PRN  artificial tears (preservative free) Ophthalmic Solution 1 Drop(s) Left EYE every 2 hours  ascorbic acid 500 milliGRAM(s) Oral daily  atorvastatin 40 milliGRAM(s) Oral at bedtime  baclofen 20 milliGRAM(s) Oral four times a day  DULoxetine 30 milliGRAM(s) Oral two times a day  gabapentin 800 milliGRAM(s) Oral three times a day  influenza   Vaccine 0.5 milliLiter(s) IntraMuscular once  lidocaine   Patch 1 Patch Transdermal daily  lidocaine   Patch 1 Patch Transdermal daily  multivitamin 1 Tablet(s) Oral daily  ondansetron Injectable 4 milliGRAM(s) IV Push every 6 hours PRN  oxyCODONE    IR 5 milliGRAM(s) Oral two times a day  oxyCODONE    IR 10 milliGRAM(s) Oral at bedtime  pantoprazole    Tablet 40 milliGRAM(s) Oral two times a day  polyethylene glycol 3350 17 Gram(s) Oral every 12 hours PRN  prednisoLONE acetate 1% Suspension 1 Drop(s) Left EYE four times a day      SOCIAL HISTORY:  Substance Use (street drugs): denies  Tobacco Usage:  denies  Alcohol Usage: denies    FAMILY HISTORY:  Family history of Alzheimer's disease      ROS:  All other systems negative     Constitutional: no fever, no chills  Eye: L eye pain  ENT:  no sore throat, no rhinorrhea  Cardiovascular:  no chest pain, no palpitation  Respiratory:  no SOB, no cough  GI:  no vomiting, no diarrhea  urinary: no dysuria  musculoskeletal: back pain  skin:  no rash  neurology:  no headache    Physical Exam:    General:   distress 2/2 back pain  HEENT: atraumatic, normocephalic, normal sclera and conjunctiva  Cardio:    regular S1,S2  Respiratory:   clear b/l, no wheezing  abd:   soft, BS +, not tender, no hepatosplenomegaly  :     no hitchcock  Musculoskeletal : no joint swelling, no edema  Skin:    no rash  Neurologic: awake, alert, bedbound      Drug Dosing Weight  Height (cm): 170.2 (04 Sep 2019 19:28)  Weight (kg): 52 (29 Aug 2019 11:21)  BMI (kg/m2): 18 (04 Sep 2019 19:28)  BSA (m2): 1.6 (04 Sep 2019 19:28)    Vital Signs Last 24 Hrs  T(F): 98.8 (09-13-19 @ 10:41), Max: 100.5 (09-11-19 @ 18:48)    Vital Signs Last 24 Hrs  HR: 100 (09-13-19 @ 10:41) (86 - 107)  BP: 116/65 (09-13-19 @ 10:41) (116/65 - 127/78)  RR: 18 (09-13-19 @ 10:41)  SpO2: 95% (09-13-19 @ 10:41) (94% - 98%)  Wt(kg): --                          7.3    9.3   )-----------( 256      ( 13 Sep 2019 06:50 )             23.2       09-13    141  |  106  |  16  ----------------------------<  105<H>  4.1   |  25  |  0.53    Ca    8.8      13 Sep 2019 06:48    TPro  5.3<L>  /  Alb  2.8<L>  /  TBili  0.4  /  DBili  x   /  AST  11  /  ALT  6<L>  /  AlkPhos  80  09-12          MICROBIOLOGY:  .Blood  09-12-19   No growth to date.  --  --      .Urine  09-09-19   >100,000 CFU/ml Klebsiella pneumoniae ESBL  >100,000 CFU/ml Pseudomonas aeruginosa  --  Klebsiella pneumoniae ESBL  Pseudomonas aeruginosa    RADIOLOGY:    ct head- Stable exam.  No mass effect, hemorrhage or evidence of acute intracranial pathology.    kidney u/s- No hydronephrosis.  Moderate urinary retention, patient unable to void.

## 2019-09-13 NOTE — PROGRESS NOTE ADULT - ASSESSMENT
67 y/o F with PMHx of aortic dissection (post-repair several years prior), spinal cord hematoma (now functionally paraplegic), chronic spasticity and pain (on Oxycodone and Baclofen pump), HTN, nephrolithiasis s/p left urethral stent, UTIs and endotheliitis/keratitis (post-corneal transplant). Patient had recent hospitalizations at [facility] for UGIB with acute blood loss anemia requiring multiple PRBC transfusions. Work up there included CTA a/p which was negative and unable to localize source of bleed, s/p multiple EGD which showed pooled blood and/or adherent clot in gastric fundus that could not be removed. Patient transferred here for further management and evaluation by Dr. Ambrocio. Patient is s/p repeat EGD on 9/5 that showed acute gastritis, gastric polyps with no source of GI bleed identified.

## 2019-09-13 NOTE — PROGRESS NOTE ADULT - PROBLEM SELECTOR PLAN 3
-secondary to spinal hematoma  -straight cath (patient has chronic urinary retention and is cathed by her  at home) q4hr  -Miralax for chronic stool retention (does periodic manual disimpactions at home)  -renal/bladder US no hydronephrosis

## 2019-09-13 NOTE — PROGRESS NOTE ADULT - PROBLEM SELECTOR PLAN 5
-Patient had recent left sided nephrolithiasis with urethral stent placement  KUB showing rounded density over L utereter as seen on CT previously  urology follow up

## 2019-09-13 NOTE — PROGRESS NOTE ADULT - PROBLEM SELECTOR PLAN 6
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

## 2019-09-13 NOTE — PROGRESS NOTE ADULT - SUBJECTIVE AND OBJECTIVE BOX
INTERVAL HPI/OVERNIGHT EVENTS:    Had active bleeding, large amount noted on the PillCam (VCE) study performed 9/11.  The bleeding site appeared to be in the distal aspect of the stomach.  Emergent EGD was performed today but no evidence of active bleeding.  Moderate amount of food in the fundus and body.    + melena yesterday with decline in H/H      MEDICATIONS  (STANDING):  artificial tears (preservative free) Ophthalmic Solution 1 Drop(s) Left EYE every 2 hours  ascorbic acid 500 milliGRAM(s) Oral daily  atorvastatin 40 milliGRAM(s) Oral at bedtime  baclofen 20 milliGRAM(s) Oral four times a day  DULoxetine 30 milliGRAM(s) Oral two times a day  gabapentin 800 milliGRAM(s) Oral three times a day  influenza   Vaccine 0.5 milliLiter(s) IntraMuscular once  lidocaine   Patch 1 Patch Transdermal daily  lidocaine   Patch 1 Patch Transdermal daily  meropenem  IVPB      meropenem  IVPB 1000 milliGRAM(s) IV Intermittent every 8 hours  multivitamin 1 Tablet(s) Oral daily  oxyCODONE    IR 5 milliGRAM(s) Oral two times a day  oxyCODONE    IR 10 milliGRAM(s) Oral at bedtime  pantoprazole    Tablet 40 milliGRAM(s) Oral two times a day  prednisoLONE acetate 1% Suspension 1 Drop(s) Left EYE four times a day  valACYclovir 1000 milliGRAM(s) Oral three times a day    MEDICATIONS  (PRN):  acetaminophen   Tablet .. 650 milliGRAM(s) Oral every 6 hours PRN Mild Pain (1 - 3), Moderate Pain (4 - 6)  ondansetron Injectable 4 milliGRAM(s) IV Push every 6 hours PRN Nausea and/or Vomiting  polyethylene glycol 3350 17 Gram(s) Oral every 12 hours PRN Constipation      Allergies    No Known Allergies    Intolerances        Review of Systems:  No NVFC.  No abdominal pain.      Vital Signs Last 24 Hrs  T(C): 36.9 (13 Sep 2019 13:57), Max: 37.3 (12 Sep 2019 18:30)  T(F): 98.4 (13 Sep 2019 13:57), Max: 99.1 (12 Sep 2019 18:30)  HR: 97 (13 Sep 2019 13:57) (86 - 101)  BP: 121/63 (13 Sep 2019 13:57) (116/65 - 127/78)  BP(mean): --  RR: 18 (13 Sep 2019 13:57) (18 - 18)  SpO2: 95% (13 Sep 2019 13:57) (94% - 98%)    PHYSICAL EXAM:  HEENT anicteric scerla  Lungs Clear  Heart RRR  Abd:  soft non tender non distended.    LABS:                        7.3    9.3   )-----------( 256      ( 13 Sep 2019 06:50 )             23.2     09-13    141  |  106  |  16  ----------------------------<  105<H>  4.1   |  25  |  0.53    Ca    8.8      13 Sep 2019 06:48    TPro  5.3<L>  /  Alb  2.8<L>  /  TBili  0.4  /  DBili  x   /  AST  11  /  ALT  6<L>  /  AlkPhos  80  09-12        LIVER FUNCTIONS - ( 12 Sep 2019 06:51 )  Alb: 2.8 g/dL / Pro: 5.3 g/dL / ALK PHOS: 80 U/L / ALT: 6 U/L / AST: 11 U/L / GGT: x             RADIOLOGY & ADDITIONAL TESTS:

## 2019-09-13 NOTE — CONSULT NOTE ADULT - ASSESSMENT
full consult to follow 68F with nephrolithiasis s/p left ureteral stent and endotheliitis/keratitis (post-corneal transplant), hx aortic dissection (post-repair several years prior), spinal cord hematoma (now functionally paraplegic, she can move her left leg), chronic spasticity and pain (on PRN Oxycodone and has Baclofen pump), HTN transferred from [facility] for UGIB.     Had solitary fever 100.5F 2/2 GIB vs ?UTI. UA+, UCx ESBL Klebs and pseudomonas --> ?UTI, 2 organisms makes contamination a possibility.     - c/w meropenem 1g IV q8h for ?UTI  - monitor all cultures

## 2019-09-13 NOTE — PROGRESS NOTE ADULT - SUBJECTIVE AND OBJECTIVE BOX
Patient is a 68y old  Female who presents with a chief complaint of Transfer from [facility] for UGIB (12 Sep 2019 18:11)      SUBJECTIVE / OVERNIGHT EVENTS:    patient seen and examined at bedside.    still c/o of right thigh pain. overall improved. shaking improved as well. no fevers. chills abdominal pain.    ROS:  14 point ROS negative in detail except stated as above    MEDICATIONS  (STANDING):  artificial tears (preservative free) Ophthalmic Solution 1 Drop(s) Left EYE every 2 hours  ascorbic acid 500 milliGRAM(s) Oral daily  atorvastatin 40 milliGRAM(s) Oral at bedtime  baclofen 20 milliGRAM(s) Oral four times a day  DULoxetine 30 milliGRAM(s) Oral two times a day  gabapentin 800 milliGRAM(s) Oral three times a day  influenza   Vaccine 0.5 milliLiter(s) IntraMuscular once  lidocaine   Patch 1 Patch Transdermal daily  lidocaine   Patch 1 Patch Transdermal daily  meropenem  IVPB      multivitamin 1 Tablet(s) Oral daily  oxyCODONE    IR 5 milliGRAM(s) Oral two times a day  oxyCODONE    IR 10 milliGRAM(s) Oral at bedtime  pantoprazole    Tablet 40 milliGRAM(s) Oral two times a day  prednisoLONE acetate 1% Suspension 1 Drop(s) Left EYE four times a day  valACYclovir 1000 milliGRAM(s) Oral three times a day    MEDICATIONS  (PRN):  acetaminophen   Tablet .. 650 milliGRAM(s) Oral every 6 hours PRN Mild Pain (1 - 3), Moderate Pain (4 - 6)  ondansetron Injectable 4 milliGRAM(s) IV Push every 6 hours PRN Nausea and/or Vomiting  polyethylene glycol 3350 17 Gram(s) Oral every 12 hours PRN Constipation      CAPILLARY BLOOD GLUCOSE        I&O's Summary    12 Sep 2019 07:01  -  13 Sep 2019 07:00  --------------------------------------------------------  IN: 1730 mL / OUT: 1950 mL / NET: -220 mL    13 Sep 2019 07:01  -  13 Sep 2019 12:19  --------------------------------------------------------  IN: 240 mL / OUT: 0 mL / NET: 240 mL        PHYSICAL EXAM:  Vital Signs Last 24 Hrs  T(C): 37.1 (13 Sep 2019 10:41), Max: 37.3 (12 Sep 2019 18:30)  T(F): 98.8 (13 Sep 2019 10:41), Max: 99.1 (12 Sep 2019 18:30)  HR: 100 (13 Sep 2019 10:41) (86 - 107)  BP: 116/65 (13 Sep 2019 10:41) (116/65 - 127/78)  BP(mean): --  RR: 18 (13 Sep 2019 10:41) (18 - 18)  SpO2: 95% (13 Sep 2019 10:41) (94% - 98%)    PHYSICAL EXAM:  GENERAL: NAD  LUNG: Clear to auscultation bilaterally; No wheezes rales or rhonchi  HEART: S1, S2 rrr no murmurs  ABDOMEN: Soft, Nontender, Nondistended, Bowel sounds present, + hardware palpated in RLQ  EXTREMITIES: No leg edema cyanosis or clubbing  NEUROLOGY: AAOx3, communicative, moves arms, lower extremity contracted  SKIN: warm and dry      LABS:                        7.3    9.3   )-----------( 256      ( 13 Sep 2019 06:50 )             23.2     09-13    141  |  106  |  16  ----------------------------<  105<H>  4.1   |  25  |  0.53    Ca    8.8      13 Sep 2019 06:48    TPro  5.3<L>  /  Alb  2.8<L>  /  TBili  0.4  /  DBili  x   /  AST  11  /  ALT  6<L>  /  AlkPhos  80  09-12          < from: US Kidney and Bladder (09.12.19 @ 11:13) >    INTERPRETATION:  CLINICAL INFORMATION: Urinary retention. Left ureteral   stone status post stent placement.    COMPARISON: CT abdomen pelvis dated 8/30/2019.    TECHNIQUE: Sonography of the kidneys and bladder.     FINDINGS:    Right kidney:  11.9 cm. No renal mass, hydronephrosis or calculi. Simple   cortical cyst interpolar region measures 0.7 cm.    Left kidney:  8.6 cm. Diffuse cortical thinning. No hydronephrosis.    Urinary bladder: Distal portion of left ureteral stent visualized within   the bladder lumen. Dependent debris within the bladder lumen.  Prevoid bladder volume 92 cc. Patient unable to void.    IMPRESSION:     No hydronephrosis.    Moderate urinary retention, patient unable to void.    < end of copied text >    < from: Xray Kidney Ureter Bladder (09.12.19 @ 10:28) >    INTERPRETATION:  CLINICAL INFORMATION: History of the ureteral stent and   stones with urinary retention. Concern for  renal calculi.    EXAM: AP radiographs of the abdomen.    COMPARISON: CT abdomen and pelvis from 8/30/2019.    FINDINGS:  Rounded density overlying the mid left ureter as seen on the CT from   8/30/2019.  Nonobstructive bowel gas pattern.  Status post median sternotomy. IVC filter present. There are right and   left lower abdominal generator devices. Surgical clips overlying the left   femur and left heart border.  The visualized osseous structures demonstrate no acute pathology.    IMPRESSION:   Rounded density overlying the distributionof the left ureter as seen on   the CT from 8/30/2019.    < end of copied text >        Care Discussed with Consultants/Other Providers:  ANEUDY Freeman

## 2019-09-13 NOTE — PROGRESS NOTE ADULT - ASSESSMENT
In summary, + VCE with bleeding in the stomach followed by melena and decline in H/H but today endoscopy (#3) negative for active bleeding.  Likely a dieulafoy' s lesson in the stomach that intermittently bleeds.    REC    Advance diet as tolerate  Continue to monitor   Any signs of bleeding, will require urgent EGD    Donell Ambrocio MD

## 2019-09-13 NOTE — PROGRESS NOTE ADULT - PROBLEM SELECTOR PLAN 7
-Continue with multi-modal pain management: Tylenol mild-mod pain, Morphine 2 q4h PRN severe pain, Baclofen 20 mg TID, Duloxetine 20 mg BID, and Gabapentin increased to 800 TID  -Patient has baclofen pump - due for refill 9/22  - oxycodone 5mg IR bid, oxycodone 10mg qhs, gabapentin increased to 800mg tid  chronic pain reccomended peripheral nerve block, left msg for them as to find out how to arrange this and with whom

## 2019-09-13 NOTE — PROGRESS NOTE ADULT - PROBLEM SELECTOR PLAN 8
U/A +  with sepsis, as fever of 100.5 and tachycardia 134 o/n  BCX 9/11 NGTD  UCX pseudomonas/ ESBL e coli  Ceftriaxone 9/9 switched to Meropenem 1gm q8hr 9/13

## 2019-09-13 NOTE — CONSULT NOTE ADULT - ATTENDING COMMENTS
68F with nephrolithiasis s/p left ureteral stent and endotheliitis/keratitis (post-corneal transplant), hx aortic dissection (post-repair several years prior), spinal cord hematoma (now functionally paraplegic, she can move her left leg), chronic spasticity and pain (on PRN Oxycodone and has Baclofen pump), HTN transferred from [facility] for UGIB.     Had solitary fever 100.5F 2/2 GIB vs ?UTI. UA+, UCx ESBL Klebs and pseudomonas  signficance of bacteruria unclear  patient gives vague history  ureteral stent/nephrolitisis may be infected    ANTI-INFECTIVES  Valtrex 9/4-->  Ceftriaxone 9/9 -->13  Meropenem 9/13-->    suggest  Continue meropenem: 5 day course anticipated  Urology follow up    please call ID if needed over weekend

## 2019-09-14 NOTE — PROGRESS NOTE ADULT - PROBLEM SELECTOR PLAN 1
with acute blood loss anemia, bloody BM overnight  -s/p push enteroscopy with no active/stigmata of bleed  -H/H drop to 7.2 this morning, GI recommends transfusion 1 unit  -There was concern for questionable Dieulafoy lesion, most recent EGD on 8/30 revealed pooled blood in gastric fundus and adherent clot that could not be removed. CTA a/p could not localize bleed and per IR at OSH, celiac axis too tortuous for mesenteric angiogram. patient was also evaluated by surgery and there was discussion about possibility of exlap with gastrotomy which was deferred at this time.   -s/p EGD by Dr. Ambrocio on 9/5 which showed acute gastritis, gastric polyps with no source of GI bleed identified  -s/p enteroscopy 9/10- jejunum normal. small hiatal hernia. gastritis neg for h pylori  -PPI IV BID  -Monitor H/H

## 2019-09-14 NOTE — PROGRESS NOTE ADULT - PROBLEM SELECTOR PLAN 7
-Continue with multi-modal pain management: Tylenol mild-mod pain, Morphine 2 q4h PRN severe pain, Baclofen 20 mg TID, Duloxetine 20 mg BID, and Gabapentin increased to 800 TID  -Patient has baclofen pump - due for refill 9/22  - oxycodone 5mg IR bid, oxycodone 10mg qhs, gabapentin increased to 800mg tid  chronic pain reccomended peripheral nerve block, left msg for them as to find out how to arrange this and with whom -Possibly secondary to HSV or CMV, but  can only say "a virus" and now s/p corneal transplant which is failing according to opt optho and confirmed by optho here  -Continue with Valtrex for viral suppression  -Continue with Prednisolone eye drop into left eye  -Daily artificial tears  -appreciate optho recs

## 2019-09-14 NOTE — PROGRESS NOTE ADULT - PROBLEM SELECTOR PLAN 2
- completed erythromycin ointment two times per day to left eye  - c/w valtrex from 1g1g TID PO (+)UA, meeting sepsis criteria  -ceftriaxone switched to meropenem yesterday (9/13)  -continues to have fever overnight with worsening leukocytosis  -continue meropenem to complete 5 day course  -appreciate ID recs

## 2019-09-14 NOTE — PROGRESS NOTE ADULT - PROBLEM SELECTOR PLAN 5
-Patient had recent left sided nephrolithiasis with urethral stent placement  KUB showing rounded density over L utereter as seen on CT previously  urology follow up -Hold Labetalol for now

## 2019-09-14 NOTE — PROGRESS NOTE ADULT - PROBLEM SELECTOR PLAN 8
U/A +  with sepsis, as fever of 100.5 and tachycardia 134 o/n  BCX 9/11 NGTD  UCX pseudomonas/ ESBL e coli  Ceftriaxone 9/9 switched to Meropenem 1gm q8hr 9/13 -Continue with multi-modal pain management: Tylenol mild-mod pain, Morphine 2 q4h PRN severe pain, Baclofen 20 mg TID, Duloxetine 20 mg BID, and Gabapentin increased to 800 TID  -Patient has baclofen pump - due for refill 9/22  - oxycodone 5mg IR bid, oxycodone 10mg qhs, gabapentin increased to 800mg tid  chronic pain reccomended peripheral nerve block, left msg for them as to find out how to arrange this and with whom

## 2019-09-14 NOTE — PROGRESS NOTE ADULT - PROBLEM SELECTOR PLAN 6
-Possibly secondary to HSV or CMV, but  can only say "a virus" and now s/p corneal transplant which is failing according to opt optho and confirmed by optho here  -Continue with Valtrex for viral suppression  -Continue with Prednisolone eye drop into left eye  -Daily artificial tears  -appreciate optho recs -Patient had recent left sided nephrolithiasis with urethral stent placement  KUB showing rounded density over L utereter as seen on CT previously  urology follow up

## 2019-09-14 NOTE — CHART NOTE - NSCHARTNOTEFT_GEN_A_CORE
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     Notified by RN for     Vital Signs Last 24 Hrs  T(C): 39.3 (09-14-19 @ 00:05), Max: 39.3 (09-14-19 @ 00:05)  T(F): 102.8 (09-14-19 @ 00:05), Max: 102.8 (09-14-19 @ 00:05)  HR: 152 (09-14-19 @ 00:05) (86 - 152)  BP: 113/58 (09-14-19 @ 00:05) (113/58 - 175/71)  BP(mean): --  RR: 18 (09-14-19 @ 00:05) (18 - 18)  SpO2: 94% (09-14-19 @ 00:05) (94% - 98%)  Daily     Daily   I&O's Summary    12 Sep 2019 07:01  -  13 Sep 2019 07:00  --------------------------------------------------------  IN: 1730 mL / OUT: 1950 mL / NET: -220 mL    13 Sep 2019 07:01  -  14 Sep 2019 02:08  --------------------------------------------------------  IN: 360 mL / OUT: 1000 mL / NET: -640 mL      CAPILLARY BLOOD GLUCOSE                          7.8    17.1  )-----------( 321      ( 14 Sep 2019 00:49 )             23.7     09-14    140  |  105  |  16  ----------------------------<  129<H>  3.7   |  23  |  0.70    Ca    8.1<L>      14 Sep 2019 00:49    TPro  5.3<L>  /  Alb  2.8<L>  /  TBili  0.4  /  DBili  x   /  AST  11  /  ALT  6<L>  /  AlkPhos  80  09-12    PT/INR - ( 14 Sep 2019 00:49 )   PT: 13.3 sec;   INR: 1.16 ratio                   Radiology:    PHYSICAL EXAM:  GENERAL: NAD, well-developed  HEAD:  Atraumatic, Normocephalic  EYES: EOMI, PERRLA, conjunctiva and sclera clear  NECK: Supple, No JVD  CHEST/LUNG: Clear to auscultation bilaterally; No wheeze  HEART: Regular rate and rhythm; No murmurs, rubs, or gallops  ABDOMEN: Soft, Nontender, Nondistended; Bowel sounds present  EXTREMITIES:  2+ Peripheral Pulses, No clubbing, cyanosis, or edema    Assessment/Plan: febrile/tachycardic --> concern for sepsis   -       Red German PA-C,   Department of Medicine Medicine [facility]     BRENT MONSALVE  MRN-[id number]    68F with PMHx of aortic dissection (post-repair several years prior), spinal cord hematoma (now functionally paraplegic, 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     Notified by RN for patient febrile 102.7, HR 140s, patient seen and assessed at bedside in NAD, alert, answering questions following commands, warm to touch , denies any CP/SOB/N/V/D/headache/abdominal pain or diaphoresis, breathing comfortably. normal bowel movements/urination(via straight cath.)    Vital Signs Last 24 Hrs  T(C): 39.3 (09-14-19 @ 00:05), Max: 39.3 (09-14-19 @ 00:05)  T(F): 102.8 (09-14-19 @ 00:05), Max: 102.8 (09-14-19 @ 00:05)  HR: 152 (09-14-19 @ 00:05) (86 - 152)  BP: 113/58 (09-14-19 @ 00:05) (113/58 - 175/71)  BP(mean): --  RR: 18 (09-14-19 @ 00:05) (18 - 18)  SpO2: 94% (09-14-19 @ 00:05) (94% - 98%)  Daily     Daily   I&O's Summary    12 Sep 2019 07:01  -  13 Sep 2019 07:00  --------------------------------------------------------  IN: 1730 mL / OUT: 1950 mL / NET: -220 mL    13 Sep 2019 07:01  -  14 Sep 2019 02:08  --------------------------------------------------------  IN: 360 mL / OUT: 1000 mL / NET: -640 mL      CAPILLARY BLOOD GLUCOSE                          7.8    17.1  )-----------( 321      ( 14 Sep 2019 00:49 )             23.7     09-14    140  |  105  |  16  ----------------------------<  129<H>  3.7   |  23  |  0.70    Ca    8.1<L>      14 Sep 2019 00:49    TPro  5.3<L>  /  Alb  2.8<L>  /  TBili  0.4  /  DBili  x   /  AST  11  /  ALT  6<L>  /  AlkPhos  80  09-12    PT/INR - ( 14 Sep 2019 00:49 )   PT: 13.3 sec;   INR: 1.16 ratio             PHYSICAL EXAM:  GENERAL: NAD, c/o full bladder   CHEST/LUNG: Clear to auscultation bilaterally;  HEART: tachycardic, regular rate   ABDOMEN: Soft, Nontender, Nondistended; Bowel sounds present  EXTREMITIES:  2+ Peripheral Pulses,     Assessment/Plan: febrile/tachycardic --> concern for sepsis   - fever 102.7F / tachycardic 140s-150s (baseline 80's HR) pulse ox 94% / RR stable 16   - D/W [facility] Dr. Bowser, agrees with below plan, reviewed, labs/cxr, instructed no telemetry (sinus tachycardia likely 2/2 fever/sepsis, HR improving 110s s/p IVF)  - patient on meropenem for ESBL UTI   - EKG stat -- > sinus tachycardia 130s-140s  - IV tylenol x 1 / cooling blanket / 1L fluid bolus + maintaince --> lactate 1.6  - stat cbc/bmp/lactate/pt/inr (all labs reviewed by myself and Hospitalist in charge)  - vancomycin x 1 gm (consider continuing)  - pancultured (blood cx x 2 / ua /ucx / CXR)  - f/u GI (GI had EUS 9/13, per documentation urgent egd if rebleeds, (cbc 7.3 --> 7.8, HCT stable, no overt s/s of bleeding, d/w Dr. Bowser)  - F/U ID   - will endorse to AM team   -attending to follow in AM       addendum:                         7.8    17.1  )-----------( 321      ( 14 Sep 2019 00:49 )             23.7   09-14    140  |  105  |  16  ----------------------------<  129<H>  3.7   |  23  |  0.70    Ca    8.1<L>      14 Sep 2019 00:49    TPro  5.3<L>  /  Alb  2.8<L>  /  TBili  0.4  /  DBili  x   /  AST  11  /  ALT  6<L>  /  AlkPhos  80  09-12    lactate: 1.6    VSS -- > improving     BP -- > sbp 120s   pulse ox -- > 97%   temp --> 99.5F (having ice chips)  HR 110s-120s sinus tachycardia   RR 16     Red German PA-C,   Department of Medicine Medicine [facility]     BRENT MONSALVE  MRN-[id number]    68F with PMHx of aortic dissection (post-repair several years prior), spinal cord hematoma (now functionally paraplegic, 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     Notified by RN for patient febrile 102.7, HR 140s, patient seen and assessed at bedside in NAD, alert, answering questions following commands, warm to touch , denies any CP/SOB/N/V/D/headache/abdominal pain or diaphoresis, breathing comfortably. normal bowel movements/urination(via straight cath.)    Vital Signs Last 24 Hrs  T(C): 39.3 (09-14-19 @ 00:05), Max: 39.3 (09-14-19 @ 00:05)  T(F): 102.8 (09-14-19 @ 00:05), Max: 102.8 (09-14-19 @ 00:05)  HR: 152 (09-14-19 @ 00:05) (86 - 152)  BP: 113/58 (09-14-19 @ 00:05) (113/58 - 175/71)  BP(mean): --  RR: 18 (09-14-19 @ 00:05) (18 - 18)  SpO2: 94% (09-14-19 @ 00:05) (94% - 98%)  Daily     Daily   I&O's Summary    12 Sep 2019 07:01  -  13 Sep 2019 07:00  --------------------------------------------------------  IN: 1730 mL / OUT: 1950 mL / NET: -220 mL    13 Sep 2019 07:01  -  14 Sep 2019 02:08  --------------------------------------------------------  IN: 360 mL / OUT: 1000 mL / NET: -640 mL      CAPILLARY BLOOD GLUCOSE                          7.8    17.1  )-----------( 321      ( 14 Sep 2019 00:49 )             23.7     09-14    140  |  105  |  16  ----------------------------<  129<H>  3.7   |  23  |  0.70    Ca    8.1<L>      14 Sep 2019 00:49    TPro  5.3<L>  /  Alb  2.8<L>  /  TBili  0.4  /  DBili  x   /  AST  11  /  ALT  6<L>  /  AlkPhos  80  09-12    PT/INR - ( 14 Sep 2019 00:49 )   PT: 13.3 sec;   INR: 1.16 ratio             PHYSICAL EXAM:  GENERAL: NAD, c/o full bladder   CHEST/LUNG: Clear to auscultation bilaterally;  HEART: tachycardic, regular rate   ABDOMEN: Soft, Nontender, Nondistended; Bowel sounds present  EXTREMITIES:  2+ Peripheral Pulses,     Assessment/Plan: febrile/tachycardic --> concern for sepsis   - fever 102.7F / tachycardic 140s-150s (baseline 80's HR) pulse ox 94% / RR stable 16   - D/W [facility] Dr. Bowser, agrees with below plan, reviewed, labs/cxr, instructed no telemetry (sinus tachycardia likely 2/2 fever/sepsis, HR improving 110s s/p IVF)  - patient on meropenem for ESBL UTI   - EKG stat -- > sinus tachycardia 130s-140s  - IV tylenol x 1 / cooling blanket / 1L fluid bolus + maintaince --> lactate 1.6  - stat cbc/bmp/lactate/pt/inr (all labs reviewed by myself and Hospitalist in charge)  - vancomycin x 1 gm (consider continuing)  - pancultured (blood cx x 2 / ua /ucx / CXR)  - f/u GI (GI had EUS 9/13, per documentation urgent egd if rebleeds, (cbc 7.3 --> 7.8, HCT stable, no overt s/s of bleeding, d/w Dr. Bowser)  - F/U ID   - VS q4hr  - will endorse to AM team   -attending to follow in AM       addendum:                         7.8    17.1  )-----------( 321      ( 14 Sep 2019 00:49 )             23.7   09-14    140  |  105  |  16  ----------------------------<  129<H>  3.7   |  23  |  0.70    Ca    8.1<L>      14 Sep 2019 00:49    TPro  5.3<L>  /  Alb  2.8<L>  /  TBili  0.4  /  DBili  x   /  AST  11  /  ALT  6<L>  /  AlkPhos  80  09-12    lactate: 1.6    VSS -- > improving     BP -- > sbp 120s   pulse ox -- > 97%   temp --> 99.5F (having ice chips)  HR 110s-120s sinus tachycardia   RR 16     Red Monserrat ANDREWS,   Department of Medicine Medicine [facility]     BRENT MONSALVE  MRN-[id number]    68F with PMHx of aortic dissection (post-repair several years prior), spinal cord hematoma (now functionally paraplegic, 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 after suffering UGIB.     Notified by RN for patient febrile 102.7, HR 140s, patient seen and assessed at bedside in NAD, alert, answering questions following commands, warm to touch , denies any CP/SOB/N/V/D/headache/abdominal pain or diaphoresis, breathing comfortably. normal bowel movements/urination(via straight cath.)    Vital Signs Last 24 Hrs  T(C): 39.3 (09-14-19 @ 00:05), Max: 39.3 (09-14-19 @ 00:05)  T(F): 102.8 (09-14-19 @ 00:05), Max: 102.8 (09-14-19 @ 00:05)  HR: 152 (09-14-19 @ 00:05) (86 - 152)  BP: 113/58 (09-14-19 @ 00:05) (113/58 - 175/71)  BP(mean): --  RR: 18 (09-14-19 @ 00:05) (18 - 18)  SpO2: 94% (09-14-19 @ 00:05) (94% - 98%)  Daily     Daily   I&O's Summary    12 Sep 2019 07:01  -  13 Sep 2019 07:00  --------------------------------------------------------  IN: 1730 mL / OUT: 1950 mL / NET: -220 mL    13 Sep 2019 07:01  -  14 Sep 2019 02:08  --------------------------------------------------------  IN: 360 mL / OUT: 1000 mL / NET: -640 mL      CAPILLARY BLOOD GLUCOSE                          7.8    17.1  )-----------( 321      ( 14 Sep 2019 00:49 )             23.7     09-14    140  |  105  |  16  ----------------------------<  129<H>  3.7   |  23  |  0.70    Ca    8.1<L>      14 Sep 2019 00:49    TPro  5.3<L>  /  Alb  2.8<L>  /  TBili  0.4  /  DBili  x   /  AST  11  /  ALT  6<L>  /  AlkPhos  80  09-12    PT/INR - ( 14 Sep 2019 00:49 )   PT: 13.3 sec;   INR: 1.16 ratio             PHYSICAL EXAM:  GENERAL: NAD, c/o full bladder   CHEST/LUNG: Clear to auscultation bilaterally;  HEART: tachycardic, regular rate   ABDOMEN: Soft, Nontender, Nondistended; Bowel sounds present  EXTREMITIES:  2+ Peripheral Pulses,     Assessment/Plan: febrile/tachycardic --> concern for sepsis   - fever 102.7F / tachycardic 140s-150s (baseline 80's HR) pulse ox 94% / RR stable 16   - D/W [facility] Dr. Bowser, agrees with below plan, reviewed, labs/cxr, instructed no telemetry (sinus tachycardia likely 2/2 fever/sepsis, HR improving 110s s/p IVF)  - patient on meropenem for ESBL UTI   - EKG stat -- > sinus tachycardia 130s-140s  - IV tylenol x 1 / cooling blanket / 1L fluid bolus + maintaince --> lactate 1.6  - stat cbc/bmp/lactate/pt/inr (all labs reviewed by myself and Hospitalist in charge)  - vancomycin x 1 gm (consider continuing)  - pancultured (blood cx x 2 / ua /ucx / CXR)  - f/u GI (GI had EUS 9/13, per documentation urgent egd if rebleeds, (cbc 7.3 --> 7.8, HCT stable, no overt s/s of bleeding, d/w Dr. Bowser)  - F/U ID   - consider cardiology reconsult/tele transfer if persistent tachycardia (has had previous runs of tachycardia to 150s on this admission)  - VS q4hr  - will endorse to AM team   -attending to follow in AM       addendum:                         7.8    17.1  )-----------( 321      ( 14 Sep 2019 00:49 )             23.7   09-14    140  |  105  |  16  ----------------------------<  129<H>  3.7   |  23  |  0.70    Ca    8.1<L>      14 Sep 2019 00:49    TPro  5.3<L>  /  Alb  2.8<L>  /  TBili  0.4  /  DBili  x   /  AST  11  /  ALT  6<L>  /  AlkPhos  80  09-12    lactate: 1.6    VSS -- > improving     BP -- > sbp 120s   pulse ox -- > 97%   temp --> 99.5F (having ice chips)  HR 110s-120s sinus tachycardia   RR 16     Red German PA-C,   Department of Medicine Medicine [facility]     BRENT MONSALVE  MRN-[id number]    68F with PMHx of aortic dissection (post-repair several years prior), spinal cord hematoma (now functionally paraplegic, 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     Notified by RN for patient febrile 102.7, HR 140s, patient seen and assessed at bedside in NAD, alert, answering questions following commands, warm to touch , denies any CP/SOB/N/V/D/headache/abdominal pain or diaphoresis, breathing comfortably. normal bowel movements/urination(via straight cath.)    Vital Signs Last 24 Hrs  T(C): 39.3 (09-14-19 @ 00:05), Max: 39.3 (09-14-19 @ 00:05)  T(F): 102.8 (09-14-19 @ 00:05), Max: 102.8 (09-14-19 @ 00:05)  HR: 152 (09-14-19 @ 00:05) (86 - 152)  BP: 113/58 (09-14-19 @ 00:05) (113/58 - 175/71)  BP(mean): --  RR: 18 (09-14-19 @ 00:05) (18 - 18)  SpO2: 94% (09-14-19 @ 00:05) (94% - 98%)  Daily     Daily   I&O's Summary    12 Sep 2019 07:01  -  13 Sep 2019 07:00  --------------------------------------------------------  IN: 1730 mL / OUT: 1950 mL / NET: -220 mL    13 Sep 2019 07:01  -  14 Sep 2019 02:08  --------------------------------------------------------  IN: 360 mL / OUT: 1000 mL / NET: -640 mL      CAPILLARY BLOOD GLUCOSE                          7.8    17.1  )-----------( 321      ( 14 Sep 2019 00:49 )             23.7     09-14    140  |  105  |  16  ----------------------------<  129<H>  3.7   |  23  |  0.70    Ca    8.1<L>      14 Sep 2019 00:49    TPro  5.3<L>  /  Alb  2.8<L>  /  TBili  0.4  /  DBili  x   /  AST  11  /  ALT  6<L>  /  AlkPhos  80  09-12    PT/INR - ( 14 Sep 2019 00:49 )   PT: 13.3 sec;   INR: 1.16 ratio             PHYSICAL EXAM:  GENERAL: NAD, c/o full bladder   CHEST/LUNG: Clear to auscultation bilaterally;  HEART: tachycardic, regular rate   ABDOMEN: Soft, Nontender, Nondistended; Bowel sounds present  EXTREMITIES:  2+ Peripheral Pulses,     Assessment/Plan: febrile/tachycardic --> concern for sepsis   - fever 102.7F / tachycardic 140s-150s (baseline 80's HR) pulse ox 94% / RR stable 16   - D/W [facility] Dr. Bowser, agrees with below plan, reviewed, labs/cxr, instructed no telemetry (sinus tachycardia likely 2/2 fever/sepsis, HR improving 110s s/p IVF)  - patient on meropenem for ESBL UTI   - EKG stat -- > sinus tachycardia 130s-140s  - IV tylenol x 1 / cooling blanket / 1L fluid bolus + maintaince --> lactate 1.6  - stat cbc/bmp/lactate/pt/inr (all labs reviewed by myself and Hospitalist in charge)  - vancomycin x 1 gm (consider continuing)  - pancultured (blood cx x 2 / ua /ucx / CXR)  - f/u GI (GI had EUS 9/13, per documentation urgent egd if rebleeds, (cbc 7.3 --> 7.8, HCT stable, no overt s/s of bleeding, d/w Dr. Bowser)  - F/U ID   - consider cardiology reconsult/tele transfer if persistent tachycardia (has had previous runs of tachycardia to 150s on this admission)  - VS q4hr  - repeat EKG AM  - will endorse to AM team   -attending to follow in AM       addendum:                         7.8    17.1  )-----------( 321      ( 14 Sep 2019 00:49 )             23.7   09-14    140  |  105  |  16  ----------------------------<  129<H>  3.7   |  23  |  0.70    Ca    8.1<L>      14 Sep 2019 00:49    TPro  5.3<L>  /  Alb  2.8<L>  /  TBili  0.4  /  DBili  x   /  AST  11  /  ALT  6<L>  /  AlkPhos  80  09-12    lactate: 1.6    VSS -- > improving     BP -- > sbp 120s   pulse ox -- > 97%   temp --> 99.5F (having ice chips)  HR 110s-120s sinus tachycardia   RR 16     Red Monserrat ANDREWS,   Department of Medicine

## 2019-09-14 NOTE — CHART NOTE - NSCHARTNOTEFT_GEN_A_CORE
Medicine NP note:  67 y/o F with PMHx of aortic dissection (post-repair several years prior), spinal cord hematoma (now functionally paraplegic), chronic spasticity and pain (on Oxycodone and Baclofen pump), HTN, nephrolithiasis s/p left urethral stent, UTIs and endotheliitis/keratitis (post-corneal transplant), with recent hospitalizations at [facility] for UGIB with acute blood loss anemia requiring multiple PRBC transfusions. Work up there included CTA a/p which was negative and unable to localize source of bleed, s/p multiple EGD which showed pooled blood and/or adherent clot in gastric fundus that could not be removed. Patient transferred here for further management and evaluation by Dr. Ambrocio. Patient is s/p repeat EGD on 9/5 that showed acute gastritis, gastric polyps with no source of GI bleed identified.    UGIB (upper gastrointestinal bleed).  +blood loss anemia, bloody  / tarry BM overnight and today   -s/p push enteroscopy with no active/stigmata of bleed  -H/H drop to 7.2 this morning, GI recommends transfusion 1 unit - given .5 / 0.5  -There was concern for questionable Dieulafoy lesion, most recent EGD on 8/30 revealed pooled blood in gastric fundus and adherent clot that could not be removed. CTA a/p could not localize bleed and per IR at OSH, celiac axis too tortuous for mesenteric angiogram. patient was also evaluated by surgery and there was discussion about possibility of exlap with gastrotomy which was deferred at this time.   -s/p EGD by Dr. Ambrocio on 9/5 which showed acute gastritis, gastric polyps with no source of GI bleed identified  -s/p enteroscopy 9/10- jejunum normal. small hiatal hernia. gastritis neg for h pylori  -PPI IV BID  -Monitor H/H.      Problem/Plan - 2:  ·  Problem: UTI (urinary tract infection). Plan: (+)UA, meeting sepsis criteria  -ceftriaxone switched to meropenem yesterday (9/13)  -continues to have fever overnight with worsening leukocytosis  -continue meropenem to complete 5 day course  -appreciate ID recs.     Problem/Plan - 3:  ·  Problem: Conjunctivitis of left eye. Plan: - completed erythromycin ointment two times per day to left eye  - c/w valtrex from 1g1g TID PO.     Problem/Plan - 4:  ·  Problem: Paraplegia. Plan: -secondary to spinal hematoma  -straight cath (patient has chronic urinary retention and is cathed by her  at home) q4hr  -Miralax for chronic stool retention (does periodic manual disimpactions at home)  -renal/bladder US no hydronephrosis.     Problem/Plan - 5:  ·  Problem: HTN (Hypertension). Plan: -Hold Labetalol for now.     Problem/Plan - 6:  Problem: Urethral foreign body, subsequent encounter.Plan: -Patient had recent left sided nephrolithiasis with urethral stent placement  KUB showing rounded density over L utereter as seen on CT previously  urology follow up.     Problem/Plan - 7:  ·  Problem: Keratitis. 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     Problem/Plan - 8:  ·  Problem: Chronic pain. Plan: -Continue with multi-modal pain management: Tylenol mild-mod pain, Morphine 2 q4h PRN severe pain, Baclofen 20 mg TID, Duloxetine 20 mg BID, and Gabapentin increased to 800 TID  -Patient has baclofen pump - due for refill 9/22  - oxycodone 5mg IR bid, oxycodone 10mg qhs, gabapentin increased to 800mg tid  chronic pain reccomended peripheral nerve block, left msg for them as to find out how to arrange this and with whom.     Problem/Plan - 9:  ·  Problem: Aortic dissection, thoracic.  Plan: -Post repair and appears stable on recent CTA performed at [facility]  -Outpatient follow up.      Problem/Plan - 10:  Problem: Discharge planning issues. Plan; home w home pt   patient has privately hired [facility] 6/7 days a week.  OOB to chair. Medicine NP note:  67 y/o F with PMHx of aortic dissection (post-repair several years prior), spinal cord hematoma (now functionally paraplegic), chronic spasticity and pain (on Oxycodone and Baclofen pump), HTN, nephrolithiasis s/p left urethral stent, UTIs and endotheliitis/keratitis (post-corneal transplant), with recent hospitalizations at [facility] for UGIB with acute blood loss anemia requiring multiple PRBC transfusions. Work up there included CTA a/p which was negative and unable to localize source of bleed, s/p multiple EGD which showed pooled blood and/or adherent clot in gastric fundus that could not be removed. Patient transferred here for further management and evaluation. On 9/14 [id number] vital signs q 4 hours, tx as below and per GI recs., intermittently febrile and on cooling blanket, Tylenol given for intermittent fever and family at bedside update with regard to plan.            UGIB (upper gastrointestinal bleed).  +blood loss anemia, bloody  / tarry BM overnight and today   -s/p push enteroscopy with no active/stigmata of bleed  -H/H drop to 7.2 this morning, GI recommends transfusion 1 unit - given .5 / .5  -s/p EGD by Dr. Ambrocio which showed acute gastritis, gastric polyps with no source of GI bleed identified  -s/p enteroscopy - jejunum normal. small hiatal hernia. gastritis neg for h pylori  -will c/w h/h trend       UTI (urinary tract infection).  (+)UA, +sepsis   -ceftriaxone changed to meropenem yesterday (9/13)  -continues to have fever overnight with worsening leukocytosis  -continue meropenem to complete 5 day course  -seen by ID / had pan cx completed on 9/14  / pending cx result   -Urethral foreign body - s/p recent left sided nephrolithiasis with urethral stent placement  -KUB w/ rounded density L utereter as on CT previously / followed by urology       Paraplegia. -secondary to spinal hematoma  -straight cath (patient has chronic urinary retention and is cathed by her  at home) q4hr  -c/w staright cath in-pt q4h  -renal/bladder US no hydronephrosis.    Discussed with Dr. Valentine, spoke with family at bedside re; plan and c/w monitoring q4h.

## 2019-09-14 NOTE — PROGRESS NOTE ADULT - SUBJECTIVE AND OBJECTIVE BOX
Patient is a 68y old  Female who presents with a chief complaint of Transfer from [facility] for UGIB (14 Sep 2019 11:24)      SUBJECTIVE / OVERNIGHT EVENTS:  fever overnight, started on cooling blanket  pt also with bloody BM x 2 overnight  pt complains of coldness under the blanket but denies chest pain, abdominal pain    ROS:  14 point ROS negative in detail except stated as above    MEDICATIONS  (STANDING):  artificial tears (preservative free) Ophthalmic Solution 1 Drop(s) Left EYE every 2 hours  ascorbic acid 500 milliGRAM(s) Oral daily  atorvastatin 40 milliGRAM(s) Oral at bedtime  baclofen 20 milliGRAM(s) Oral four times a day  DULoxetine 30 milliGRAM(s) Oral two times a day  gabapentin 800 milliGRAM(s) Oral three times a day  influenza   Vaccine 0.5 milliLiter(s) IntraMuscular once  lidocaine   Patch 1 Patch Transdermal daily  lidocaine   Patch 1 Patch Transdermal daily  meropenem  IVPB      meropenem  IVPB 1000 milliGRAM(s) IV Intermittent every 8 hours  multivitamin 1 Tablet(s) Oral daily  oxyCODONE    IR 5 milliGRAM(s) Oral two times a day  oxyCODONE    IR 10 milliGRAM(s) Oral at bedtime  pantoprazole    Tablet 40 milliGRAM(s) Oral two times a day  prednisoLONE acetate 1% Suspension 1 Drop(s) Left EYE four times a day  valACYclovir 1000 milliGRAM(s) Oral three times a day    MEDICATIONS  (PRN):  acetaminophen   Tablet .. 650 milliGRAM(s) Oral every 6 hours PRN Mild Pain (1 - 3), Moderate Pain (4 - 6)  ondansetron Injectable 4 milliGRAM(s) IV Push every 6 hours PRN Nausea and/or Vomiting  polyethylene glycol 3350 17 Gram(s) Oral every 12 hours PRN Constipation  zolpidem 5 milliGRAM(s) Oral at bedtime PRN Insomnia      CAPILLARY BLOOD GLUCOSE        I&O's Summary    13 Sep 2019 07:01  -  14 Sep 2019 07:00  --------------------------------------------------------  IN: 480 mL / OUT: 1900 mL / NET: -1420 mL    14 Sep 2019 07:01  -  14 Sep 2019 12:23  --------------------------------------------------------  IN: 0 mL / OUT: 400 mL / NET: -400 mL        PHYSICAL EXAM:  Vital Signs Last 24 Hrs  T(C): 37.2 (14 Sep 2019 10:30), Max: 39.3 (14 Sep 2019 00:05)  T(F): 98.9 (14 Sep 2019 10:30), Max: 102.8 (14 Sep 2019 00:05)  HR: 114 (14 Sep 2019 10:30) (97 - 152)  BP: 110/63 (14 Sep 2019 10:30) (107/57 - 175/71)  BP(mean): --  RR: 18 (14 Sep 2019 10:30) (18 - 18)  SpO2: 98% (14 Sep 2019 10:30) (94% - 98%)    GENERAL: in mild distress under cooling blanket  HEAD:  Atraumatic, Normocephalic  EYES: EOMI, PERRLA, conjunctiva and sclera clear  NECK: Supple, No JVD  CHEST/LUNG: Clear to auscultation bilaterally; No wheeze  HEART: Regular rate and rhythm; No murmurs, rubs, or gallops  ABDOMEN: Soft, Nontender, Nondistended; Bowel sounds present; (+) bloody BM   EXTREMITIES:  2+ Peripheral Pulses, No clubbing, cyanosis, or edema  NEUROLOGY: AAOx3; non-focal  SKIN: No rashes or lesions    LABS:  personally reviewed                        7.2    17.3  )-----------( 281      ( 14 Sep 2019 06:31 )             22.6     09-14    139  |  107  |  15  ----------------------------<  125<H>  3.1<L>   |  21<L>  |  0.60    Ca    8.3<L>      14 Sep 2019 06:31      PT/INR - ( 14 Sep 2019 00:49 )   PT: 13.3 sec;   INR: 1.16 ratio                   RADIOLOGY & ADDITIONAL TESTS:    Imaging Personally Reviewed:  -CXR: clear lungs    Consultant(s) Notes Reviewed:  GI, ID    Care Discussed with Consultants/Other Providers: GI

## 2019-09-14 NOTE — PROGRESS NOTE ADULT - PROBLEM SELECTOR PLAN 3
-secondary to spinal hematoma  -straight cath (patient has chronic urinary retention and is cathed by her  at home) q4hr  -Miralax for chronic stool retention (does periodic manual disimpactions at home)  -renal/bladder US no hydronephrosis - completed erythromycin ointment two times per day to left eye  - c/w valtrex from 1g1g TID PO

## 2019-09-14 NOTE — PROGRESS NOTE ADULT - ASSESSMENT
Recurrent gastric bleeding (confirmed on capsule endoscopy) unable to be identified on multiple EGDs.  Again with decreased H/H but EGD last night without active bleeding   Suspicious is dieulafoy lesion in the stomach that intermittently bleeds.  Now febrile overnight.  Otherwise HD stable.    REC    ID follow up for fever.  Tx 1 UPRBC- d/w medicine PA  Advance diet as tolerates   Continue to monitor vitals, H/H  Any signs of acute bleeding will require urgent EGD    Discussed in detail with pt. and sister at bedside.

## 2019-09-14 NOTE — PROGRESS NOTE ADULT - PROBLEM SELECTOR PLAN 4
-Hold Labetalol for now -secondary to spinal hematoma  -straight cath (patient has chronic urinary retention and is cathed by her  at home) q4hr  -Miralax for chronic stool retention (does periodic manual disimpactions at home)  -renal/bladder US no hydronephrosis

## 2019-09-14 NOTE — PROGRESS NOTE ADULT - SUBJECTIVE AND OBJECTIVE BOX
GI COVERAGE FOR DR ROLDAN      INTERVAL HPI/OVERNIGHT EVENTS:  Overnight events noted- + febrile; 2 black BM overnight.  Denies abdominal pain.  no N/V.    MEDICATIONS  (STANDING):  artificial tears (preservative free) Ophthalmic Solution 1 Drop(s) Left EYE every 2 hours  ascorbic acid 500 milliGRAM(s) Oral daily  atorvastatin 40 milliGRAM(s) Oral at bedtime  baclofen 20 milliGRAM(s) Oral four times a day  DULoxetine 30 milliGRAM(s) Oral two times a day  gabapentin 800 milliGRAM(s) Oral three times a day  influenza   Vaccine 0.5 milliLiter(s) IntraMuscular once  lidocaine   Patch 1 Patch Transdermal daily  lidocaine   Patch 1 Patch Transdermal daily  meropenem  IVPB      meropenem  IVPB 1000 milliGRAM(s) IV Intermittent every 8 hours  multivitamin 1 Tablet(s) Oral daily  oxyCODONE    IR 5 milliGRAM(s) Oral two times a day  oxyCODONE    IR 10 milliGRAM(s) Oral at bedtime  pantoprazole    Tablet 40 milliGRAM(s) Oral two times a day  potassium chloride    Tablet ER 40 milliEquivalent(s) Oral every 4 hours  prednisoLONE acetate 1% Suspension 1 Drop(s) Left EYE four times a day  sodium chloride 0.9%. 1000 milliLiter(s) (75 mL/Hr) IV Continuous <Continuous>  valACYclovir 1000 milliGRAM(s) Oral three times a day    MEDICATIONS  (PRN):  acetaminophen   Tablet .. 650 milliGRAM(s) Oral every 6 hours PRN Mild Pain (1 - 3), Moderate Pain (4 - 6)  ondansetron Injectable 4 milliGRAM(s) IV Push every 6 hours PRN Nausea and/or Vomiting  polyethylene glycol 3350 17 Gram(s) Oral every 12 hours PRN Constipation  zolpidem 5 milliGRAM(s) Oral at bedtime PRN Insomnia      Allergies    No Known Allergies    Vital Signs Last 24 Hrs  T(C): 37.2 (14 Sep 2019 10:30), Max: 39.3 (14 Sep 2019 00:05)  T(F): 98.9 (14 Sep 2019 10:30), Max: 102.8 (14 Sep 2019 00:05)  HR: 114 (14 Sep 2019 10:30) (97 - 152)  BP: 110/63 (14 Sep 2019 10:30) (107/57 - 175/71)  BP(mean): --  RR: 18 (14 Sep 2019 10:30) (18 - 18)  SpO2: 98% (14 Sep 2019 10:30) (94% - 98%)    PHYSICAL EXAM:      Constitutional: NAD, well-developed  HEENT: anicteric  Respiratory: CTA and P  Cardiovascular: S1 and S2, RRR, no M  Gastrointestinal: soft, NTND; NABS  Neurological: A/O x 3, no focal deficits  Psychiatric: Normal mood, normal affect    LABS:                        7.2    17.3  )-----------( 281      ( 14 Sep 2019 06:31 )             22.6     Hemoglobin: 7.2 g/dL (09-14 @ 06:31)  Hemoglobin: 7.8 g/dL (09-14 @ 00:49)  Hemoglobin: 7.3 g/dL (09-13 @ 06:50)  Hemoglobin: 7.4 g/dL (09-12 @ 17:18)  Hemoglobin: 7.1 g/dL (09-12 @ 08:42)  Hemoglobin: 6.9 g/dL (09-11 @ 20:20)  Hemoglobin: 7.3 g/dL (09-11 @ 17:29)      09-14    139  |  107  |  15  ----------------------------<  125<H>  3.1<L>   |  21<L>  |  0.60    Ca    8.3<L>      14 Sep 2019 06:31      Bilirubin Total, Serum: 0.4 mg/dL (09-12 @ 06:51)      Aspartate Aminotransferase (AST/SGOT): 11 U/L (09-12 @ 06:51)    Alanine Aminotransferase (ALT/SGPT): 6 U/L (09-12 @ 06:51)          RADIOLOGY & ADDITIONAL TESTS:

## 2019-09-14 NOTE — PROGRESS NOTE ADULT - ASSESSMENT
69 y/o F with PMHx of aortic dissection (post-repair several years prior), spinal cord hematoma (now functionally paraplegic), chronic spasticity and pain (on Oxycodone and Baclofen pump), HTN, nephrolithiasis s/p left urethral stent, UTIs and endotheliitis/keratitis (post-corneal transplant), with recent hospitalizations at [facility] for UGIB with acute blood loss anemia requiring multiple PRBC transfusions. Work up there included CTA a/p which was negative and unable to localize source of bleed, s/p multiple EGD which showed pooled blood and/or adherent clot in gastric fundus that could not be removed. Patient transferred here for further management and evaluation by Dr. Ambrocio. Patient is s/p repeat EGD on 9/5 that showed acute gastritis, gastric polyps with no source of GI bleed identified.

## 2019-09-15 NOTE — PROGRESS NOTE ADULT - ASSESSMENT
69 y/o F with PMHx of aortic dissection (post-repair several years prior), spinal cord hematoma (now functionally paraplegic), chronic spasticity and pain (on Oxycodone and Baclofen pump), HTN, nephrolithiasis s/p left urethral stent, UTIs and endotheliitis/keratitis (post-corneal transplant), with recent hospitalizations at [facility] for UGIB with acute blood loss anemia requiring multiple PRBC transfusions. Work up there included CTA a/p which was negative and unable to localize source of bleed, s/p multiple EGD which showed pooled blood and/or adherent clot in gastric fundus that could not be removed. Patient transferred here for further management and evaluation by Dr. Ambrocio. Patient is s/p repeat EGD on 9/5 that showed acute gastritis, gastric polyps with no source of GI bleed identified.

## 2019-09-15 NOTE — PROGRESS NOTE ADULT - PROBLEM SELECTOR PLAN 2
(+)UA, meeting sepsis criteria  -ceftriaxone switched to meropenem (9/13)  -no further episodes of fever, leukocytosis improving this morning  -continue meropenem to complete 5 day course (day3)  -appreciate ID recs

## 2019-09-15 NOTE — PROGRESS NOTE ADULT - ASSESSMENT
Recurrent gastric bleeding (confirmed on capsule endoscopy) unable to be identified on multiple EGDs.  H/H stable since PRBC X 1 yesterday.  No evidence of active bleeding at this time.   Suspicious is dieulafoy lesion in the stomach that intermittently bleeds.  Now febrile overnight.  Otherwise HD stable.    REC    Broad spectrum Abx  Advance diet as tolerates   Continue to monitor vitals, H/H  Any signs of acute bleeding will require urgent EGD  Continue pantoprazole daily.

## 2019-09-15 NOTE — PROGRESS NOTE ADULT - SUBJECTIVE AND OBJECTIVE BOX
GI COVERAGE FOR DR ROLDAN    INTERVAL HPI/OVERNIGHT EVENTS:  events noted; + fever last night; afebrile today.  no melena/hematochezia.  no abdominal pain, N/V.    MEDICATIONS  (STANDING):  artificial tears (preservative free) Ophthalmic Solution 1 Drop(s) Left EYE every 2 hours  ascorbic acid 500 milliGRAM(s) Oral daily  atorvastatin 40 milliGRAM(s) Oral at bedtime  baclofen 20 milliGRAM(s) Oral four times a day  DULoxetine 30 milliGRAM(s) Oral two times a day  gabapentin 800 milliGRAM(s) Oral three times a day  influenza   Vaccine 0.5 milliLiter(s) IntraMuscular once  lidocaine   Patch 1 Patch Transdermal daily  lidocaine   Patch 1 Patch Transdermal daily  meropenem  IVPB      meropenem  IVPB 1000 milliGRAM(s) IV Intermittent every 8 hours  multivitamin 1 Tablet(s) Oral daily  oxyCODONE    IR 5 milliGRAM(s) Oral two times a day  oxyCODONE    IR 10 milliGRAM(s) Oral at bedtime  pantoprazole    Tablet 40 milliGRAM(s) Oral two times a day  prednisoLONE acetate 1% Suspension 1 Drop(s) Left EYE four times a day  valACYclovir 1000 milliGRAM(s) Oral three times a day    MEDICATIONS  (PRN):  acetaminophen   Tablet .. 650 milliGRAM(s) Oral every 6 hours PRN Mild Pain (1 - 3), Moderate Pain (4 - 6)  ondansetron Injectable 4 milliGRAM(s) IV Push every 6 hours PRN Nausea and/or Vomiting  polyethylene glycol 3350 17 Gram(s) Oral every 12 hours PRN Constipation  zolpidem 5 milliGRAM(s) Oral at bedtime PRN Insomnia      Allergies    No Known Allergies    Intolerances              Vital Signs Last 24 Hrs  T(C): 37 (15 Sep 2019 12:20), Max: 38.6 (14 Sep 2019 18:43)  T(F): 98.6 (15 Sep 2019 12:20), Max: 101.4 (14 Sep 2019 18:43)  HR: 98 (15 Sep 2019 12:20) (94 - 115)  BP: 116/67 (15 Sep 2019 12:20) (116/67 - 136/77)  BP(mean): --  RR: 18 (15 Sep 2019 12:20) (18 - 18)  SpO2: 99% (15 Sep 2019 12:20) (99% - 100%)    PHYSICAL EXAM:      Constitutional: NAD, well-developed  HEENT: anicteric  Gastrointestinal: soft, NTND  Psychiatric: blundted affect  Skin: No rashes    LABS:                        7.9    13.38 )-----------( 170      ( 15 Sep 2019 09:39 )             27.1     Hemoglobin: 7.9 g/dL (09-15 @ 09:39)  Hemoglobin: 7.2 g/dL (09-14 @ 06:31)  Hemoglobin: 7.8 g/dL (09-14 @ 00:49)  Hemoglobin: 7.3 g/dL (09-13 @ 06:50)  Hemoglobin: 7.4 g/dL (09-12 @ 17:18)      09-15    139  |  108  |  10  ----------------------------<  92  3.9   |  19<L>  |  0.60    Ca    8.9      15 Sep 2019 07:31                    RADIOLOGY & ADDITIONAL TESTS:

## 2019-09-15 NOTE — PROGRESS NOTE ADULT - SUBJECTIVE AND OBJECTIVE BOX
Patient is a 68y old  Female who presents with a chief complaint of Transfer from [facility] for UGIB (14 Sep 2019 12:22)      SUBJECTIVE / OVERNIGHT EVENTS:  no acute events overnight, vss, afebrile  pt complains of left thigh pain, applied lidoderm patch overnight  multiple BMs yesterday, still with some blood    ROS:  14 point ROS negative in detail except stated as above    MEDICATIONS  (STANDING):  artificial tears (preservative free) Ophthalmic Solution 1 Drop(s) Left EYE every 2 hours  ascorbic acid 500 milliGRAM(s) Oral daily  atorvastatin 40 milliGRAM(s) Oral at bedtime  baclofen 20 milliGRAM(s) Oral four times a day  DULoxetine 30 milliGRAM(s) Oral two times a day  gabapentin 800 milliGRAM(s) Oral three times a day  influenza   Vaccine 0.5 milliLiter(s) IntraMuscular once  lidocaine   Patch 1 Patch Transdermal daily  lidocaine   Patch 1 Patch Transdermal daily  meropenem  IVPB      meropenem  IVPB 1000 milliGRAM(s) IV Intermittent every 8 hours  multivitamin 1 Tablet(s) Oral daily  oxyCODONE    IR 5 milliGRAM(s) Oral two times a day  oxyCODONE    IR 10 milliGRAM(s) Oral at bedtime  pantoprazole    Tablet 40 milliGRAM(s) Oral two times a day  prednisoLONE acetate 1% Suspension 1 Drop(s) Left EYE four times a day  valACYclovir 1000 milliGRAM(s) Oral three times a day    MEDICATIONS  (PRN):  acetaminophen   Tablet .. 650 milliGRAM(s) Oral every 6 hours PRN Mild Pain (1 - 3), Moderate Pain (4 - 6)  ondansetron Injectable 4 milliGRAM(s) IV Push every 6 hours PRN Nausea and/or Vomiting  polyethylene glycol 3350 17 Gram(s) Oral every 12 hours PRN Constipation  zolpidem 5 milliGRAM(s) Oral at bedtime PRN Insomnia      CAPILLARY BLOOD GLUCOSE        I&O's Summary    14 Sep 2019 07:  -  15 Sep 2019 07:00  --------------------------------------------------------  IN: 1140 mL / OUT: 1900 mL / NET: -760 mL    15 Sep 2019 07:01  -  15 Sep 2019 11:43  --------------------------------------------------------  IN: 0 mL / OUT: 350 mL / NET: -350 mL        PHYSICAL EXAM:  Vital Signs Last 24 Hrs  T(C): 36.4 (15 Sep 2019 04:06), Max: 38.6 (14 Sep 2019 18:43)  T(F): 97.5 (15 Sep 2019 04:06), Max: 101.4 (14 Sep 2019 18:43)  HR: 94 (15 Sep 2019 04:06) (94 - 115)  BP: 136/77 (15 Sep 2019 04:06) (109/57 - 136/77)  BP(mean): --  RR: 18 (15 Sep 2019 04:06) (18 - 18)  SpO2: 100% (15 Sep 2019 04:06) (98% - 100%)    GENERAL: in mild distress  HEAD:  Atraumatic, Normocephalic  EYES: EOMI, PERRLA, conjunctiva and sclera clear  NECK: Supple, No JVD  CHEST/LUNG: Clear to auscultation bilaterally; No wheeze  HEART: Regular rate and rhythm; No murmurs, rubs, or gallops  ABDOMEN: Soft, Nontender, Nondistended; Bowel sounds present  EXTREMITIES:  no tenderness to palpate in left thigh/hip area; 2+ Peripheral Pulses, No clubbing, cyanosis, or edema  NEUROLOGY: AAOx3; non-focal  SKIN: No rashes or lesions    LABS:  personally reviewed                        7.9    13.38 )-----------( 170      ( 15 Sep 2019 09:39 )             27.1     09-15    139  |  108  |  10  ----------------------------<  92  3.9   |  19<L>  |  0.60    Ca    8.9      15 Sep 2019 07:31      PT/INR - ( 14 Sep 2019 00:49 )   PT: 13.3 sec;   INR: 1.16 ratio               Urinalysis Basic - ( 14 Sep 2019 11:56 )    Color: Yellow / Appearance: Turbid / S.015 / pH: x  Gluc: x / Ketone: Negative  / Bili: Negative / Urobili: <2 mg/dL   Blood: x / Protein: 100 mg/dL / Nitrite: Negative   Leuk Esterase: Large / RBC: 30 /HPF / WBC >720 /HPF   Sq Epi: x / Non Sq Epi: 2 /HPF / Bacteria: Moderate        RADIOLOGY & ADDITIONAL TESTS:    Imaging Personally Reviewed:    Consultant(s) Notes Reviewed:  GI, ID    Care Discussed with Consultants/Other Providers:

## 2019-09-15 NOTE — PROGRESS NOTE ADULT - PROBLEM SELECTOR PLAN 1
with acute blood loss anemia, bloody BM   -s/p push enteroscopy with no active/stigmata of bleed  -H/H stable this morning, last transfusion 9/14  -There was concern for questionable Dieulafoy lesion, most recent EGD on 8/30 revealed pooled blood in gastric fundus and adherent clot that could not be removed. CTA a/p could not localize bleed and per IR at OSH, celiac axis too tortuous for mesenteric angiogram. patient was also evaluated by surgery and there was discussion about possibility of exlap with gastrotomy which was deferred at this time.   -s/p EGD by Dr. Ambrocio on 9/5 which showed acute gastritis, gastric polyps with no source of GI bleed identified  -s/p enteroscopy 9/10- jejunum normal. small hiatal hernia. gastritis neg for h pylori  -PPI IV BID  -Monitor H/H

## 2019-09-16 NOTE — PROGRESS NOTE ADULT - SUBJECTIVE AND OBJECTIVE BOX
INTERVAL HPI/OVERNIGHT EVENTS:    2 BMs today and were brown to yellow.  NO abdominal pain.    MEDICATIONS  (STANDING):  artificial tears (preservative free) Ophthalmic Solution 1 Drop(s) Left EYE every 2 hours  ascorbic acid 500 milliGRAM(s) Oral daily  atorvastatin 40 milliGRAM(s) Oral at bedtime  baclofen 20 milliGRAM(s) Oral four times a day  DULoxetine 30 milliGRAM(s) Oral two times a day  gabapentin 800 milliGRAM(s) Oral three times a day  influenza   Vaccine 0.5 milliLiter(s) IntraMuscular once  lidocaine   Patch 1 Patch Transdermal daily  lidocaine   Patch 1 Patch Transdermal daily  meropenem  IVPB      meropenem  IVPB 1000 milliGRAM(s) IV Intermittent every 8 hours  multivitamin 1 Tablet(s) Oral daily  oxyCODONE    IR 5 milliGRAM(s) Oral two times a day  oxyCODONE    IR 10 milliGRAM(s) Oral at bedtime  pantoprazole    Tablet 40 milliGRAM(s) Oral two times a day  prednisoLONE acetate 1% Suspension 1 Drop(s) Left EYE four times a day  valACYclovir 1000 milliGRAM(s) Oral three times a day    MEDICATIONS  (PRN):  acetaminophen   Tablet .. 650 milliGRAM(s) Oral every 6 hours PRN Mild Pain (1 - 3), Moderate Pain (4 - 6)  ondansetron Injectable 4 milliGRAM(s) IV Push every 6 hours PRN Nausea and/or Vomiting  polyethylene glycol 3350 17 Gram(s) Oral every 12 hours PRN Constipation  zolpidem 5 milliGRAM(s) Oral at bedtime PRN Insomnia      Allergies    No Known Allergies    Intolerances        Vital Signs Last 24 Hrs  T(C): 36.6 (16 Sep 2019 14:21), Max: 36.9 (15 Sep 2019 20:40)  T(F): 97.9 (16 Sep 2019 14:21), Max: 98.5 (15 Sep 2019 20:40)  HR: 89 (16 Sep 2019 14:21) (80 - 90)  BP: 97/61 (16 Sep 2019 14:21) (97/61 - 125/66)  BP(mean): --  RR: 18 (16 Sep 2019 14:21) (18 - 18)  SpO2: 100% (16 Sep 2019 14:21) (94% - 100%)    PHYSICAL EXAM:  Constitutional: NAD, well-developed  Neck: No LAD, supple  Respiratory: CTAB  Cardiovascular: S1 and S2, RRR,   Gastrointestinal: BS+, soft, NT/ND, neg HSM,      LABS:                        7.4    8.62  )-----------( 253      ( 16 Sep 2019 09:42 )             24.8     09-16    141  |  107  |  8   ----------------------------<  94  3.7   |  25  |  0.39<L>    Ca    8.4      16 Sep 2019 07:31          LIVER FUNCTIONS - ( 12 Sep 2019 06:51 )  Alb: 2.8 g/dL / Pro: 5.3 g/dL / ALK PHOS: 80 U/L / ALT: 6 U/L / AST: 11 U/L / GGT: x             RADIOLOGY & ADDITIONAL TESTS:

## 2019-09-16 NOTE — PROGRESS NOTE ADULT - PROBLEM SELECTOR PLAN 2
(+)UA, meeting sepsis criteria, BCX NGTD 9/14  -ceftriaxone switched to meropenem (9/13)  -no further episodes of fever, leukocytosis improving this morning  -continue meropenem to complete 5 day course (day4)  -appreciate ID recs

## 2019-09-16 NOTE — PHARMACOTHERAPY INTERVENTION NOTE - COMMENTS
Patient is currently on oxycodone, which is scheduled to auto-discontinue today. Recommendation made to renew order.    Nathan Mueller, PharmD  [phone]

## 2019-09-16 NOTE — PROGRESS NOTE ADULT - SUBJECTIVE AND OBJECTIVE BOX
Follow Up: fever      Interval History/ROS: complains of back pain    Allergies  No Known Allergies    ANTIMICROBIALS:  meropenem  IVPB    meropenem  IVPB 1000 every 8 hours  valACYclovir 1000 three times a day    OTHER MEDS:  MEDICATIONS  (STANDING):  acetaminophen   Tablet .. 650 every 6 hours PRN  atorvastatin 40 at bedtime  baclofen 20 four times a day  DULoxetine 30 two times a day  gabapentin 800 three times a day  influenza   Vaccine 0.5 once  ondansetron Injectable 4 every 6 hours PRN  oxyCODONE    IR 5 two times a day  oxyCODONE    IR 10 at bedtime  pantoprazole    Tablet 40 two times a day  polyethylene glycol 3350 17 every 12 hours PRN  zolpidem 5 at bedtime PRN    Vital Signs Last 24 Hrs  T(C): 36.6 (16 Sep 2019 14:21), Max: 36.9 (15 Sep 2019 20:40)  T(F): 97.9 (16 Sep 2019 14:21), Max: 98.5 (15 Sep 2019 20:40)  HR: 89 (16 Sep 2019 14:21) (80 - 90)  BP: 97/61 (16 Sep 2019 14:21) (97/61 - 125/66)  BP(mean): --  RR: 18 (16 Sep 2019 14:21) (18 - 18)  SpO2: 100% (16 Sep 2019 14:21) (94% - 100%)    PHYSICAL EXAM:  General: exclaiming in pain,  Non-toxic  Neurology: alert   Respiratory: Clear to auscultation bilaterally  CV: RRR, S1S2, no murmurs, rubs or gallops  Abdominal: Soft, Non-tender, non-distended, no CVAT  Extremities: No edema  Line Sites: Clear  Skin: No rash                        7.4    8.62  )-----------( 253      ( 16 Sep 2019 09:42 )             24.8   WBC Count: 8.62 (09-16 @ 09:42)  WBC Count: 13.38 (09-15 @ 09:39)  WBC Count: 17.3 (09-14 @ 06:31)  WBC Count: 17.1 (09-14 @ 00:49)  WBC Count: 9.3 (09-13 @ 06:50)  WBC Count: 9.7 (09-12 @ 17:18)  WBC Count: 11.68 (09-12 @ 08:42)  WBC Count: 14.9 (09-11 @ 20:20)      09-16    141  |  107  |  8   ----------------------------<  94  3.7   |  25  |  0.39<L>    Ca    8.4      16 Sep 2019 07:31      MICROBIOLOGY:  .Urine  09-14-19   <10,000 CFU/mL Normal Urogenital Zora  --  --      .Blood  09-14-19   No growth to date.  --  --      .Blood  09-12-19   No growth to date.  --  --      .Urine  09-09-19   >100,000 CFU/ml Klebsiella pneumoniae ESBL  >100,000 CFU/ml Pseudomonas aeruginosa  --  Klebsiella pneumoniae ESBL  Pseudomonas aeruginosa      .Urine Clean Catch (Midstream)  08-28-19   10,000 - 49,000 CFU/mL Presumptive Candida albicans  --  --      .Blood None  08-28-19   No growth at 5 days.  --  --      .Blood None  08-28-19   No growth at 5 days.  --  --    Rapid RVP Result: NotDetec (09-14 @ 09:04)        RADIOLOGY:    Misael Guerrero MD; Division of Infectious Disease; Pager: [phone]; nights and weekends: [phone]

## 2019-09-16 NOTE — PROGRESS NOTE ADULT - PROBLEM SELECTOR PLAN 1
with acute blood loss anemia, bloody BM   -s/p push enteroscopy with no active/stigmata of bleed  -H/H stable this morning, last transfusion 9/14  -There was concern for questionable Dieulafoy lesion, most recent EGD on 8/30 revealed pooled blood in gastric fundus and adherent clot that could not be removed. CTA a/p could not localize bleed and per IR at OSH, celiac axis too tortuous for mesenteric angiogram. patient was also evaluated by surgery and there was discussion about possibility of exlap with gastrotomy which was deferred at this time.   -s/p EGD by Dr. Ambrocio on 9/5 which showed acute gastritis, gastric polyps with no source of GI bleed identified  -s/p enteroscopy 9/10- jejunum normal. small hiatal hernia. gastritis neg for h pylori  -PPI IV BID  -Monitor H/H, if bleeds again then would need EGD urgently

## 2019-09-16 NOTE — PROGRESS NOTE ADULT - ASSESSMENT
In summary, no evidence of bleed at the moment.     Continue to monitor H/H.    Urgent EGD if evidence of bleed.    Will continue to follow.    Donell Ambrocio MD

## 2019-09-16 NOTE — PROGRESS NOTE ADULT - ASSESSMENT
69 y/o F with PMHx of aortic dissection (post-repair several years prior), spinal cord hematoma (now functionally paraplegic), chronic spasticity and pain (on Oxycodone and Baclofen pump), HTN, nephrolithiasis s/p left urethral stent, UTIs and endotheliitis/keratitis (post-corneal transplant), with [facility] with acute blood loss anemia requiring multiple PRBC transfusions. Work up there included CTA a/p which was negative and unable to localize source of bleed, s/p multiple EGD which showed pooled blood and/or adherent clot in gastric fundus that could not be removed. Patient transferred here for further management and evaluation by Dr. Ambrocio.

## 2019-09-16 NOTE — PROGRESS NOTE ADULT - ASSESSMENT
68F with nephrolithiasis s/p left ureteral stent and endotheliitis/keratitis (post-corneal transplant), hx aortic dissection (post-repair several years prior), spinal cord hematoma (now functionally paraplegic, she can move her left leg), chronic spasticity and pain (on PRN Oxycodone and has Baclofen pump), HTN transferred from [facility] for UGIB.     Had solitary fever 100.5F 2/2 GIB vs ?UTI. UA+, UCx ESBL Klebs and pseudomonas  signficance of bacteruria unclear  patient gives vague history  ureteral stent/nephrolitisis may be infected  GI is suspicious pf Dieulafoy lesion in the stomach that intermittently bleeds  leukocytosis resolved  bacteruria cleared on antibiotics  afebrile since 9/14    ANTI-INFECTIVES  Valtrex 9/4-->  Ceftriaxone 9/9 -->13  Meropenem 9/13-->    suggest  Complete meropenem: 5 day course on 9/18

## 2019-09-16 NOTE — PROGRESS NOTE ADULT - SUBJECTIVE AND OBJECTIVE BOX
Patient is a 68y old  Female who presents with a chief complaint of Transfer from [facility] for UGIB (15 Sep 2019 13:48)      SUBJECTIVE / OVERNIGHT EVENTS:    patient seen and examined.  L eye pain. wants to see optho and wants to speak with PMR in regards to her spasms    no abdominal pain, fevers or chills    ROS:  14 point ROS negative in detail except stated as above    MEDICATIONS  (STANDING):  artificial tears (preservative free) Ophthalmic Solution 1 Drop(s) Left EYE every 2 hours  ascorbic acid 500 milliGRAM(s) Oral daily  atorvastatin 40 milliGRAM(s) Oral at bedtime  baclofen 20 milliGRAM(s) Oral four times a day  DULoxetine 30 milliGRAM(s) Oral two times a day  gabapentin 800 milliGRAM(s) Oral three times a day  influenza   Vaccine 0.5 milliLiter(s) IntraMuscular once  lidocaine   Patch 1 Patch Transdermal daily  lidocaine   Patch 1 Patch Transdermal daily  meropenem  IVPB      meropenem  IVPB 1000 milliGRAM(s) IV Intermittent every 8 hours  multivitamin 1 Tablet(s) Oral daily  oxyCODONE    IR 5 milliGRAM(s) Oral two times a day  oxyCODONE    IR 10 milliGRAM(s) Oral at bedtime  pantoprazole    Tablet 40 milliGRAM(s) Oral two times a day  prednisoLONE acetate 1% Suspension 1 Drop(s) Left EYE four times a day  valACYclovir 1000 milliGRAM(s) Oral three times a day    MEDICATIONS  (PRN):  acetaminophen   Tablet .. 650 milliGRAM(s) Oral every 6 hours PRN Mild Pain (1 - 3), Moderate Pain (4 - 6)  ondansetron Injectable 4 milliGRAM(s) IV Push every 6 hours PRN Nausea and/or Vomiting  polyethylene glycol 3350 17 Gram(s) Oral every 12 hours PRN Constipation  zolpidem 5 milliGRAM(s) Oral at bedtime PRN Insomnia      CAPILLARY BLOOD GLUCOSE        I&O's Summary    15 Sep 2019 07:01  -  16 Sep 2019 07:00  --------------------------------------------------------  IN: 1710 mL / OUT: 2550 mL / NET: -840 mL    16 Sep 2019 07:01  -  16 Sep 2019 12:53  --------------------------------------------------------  IN: 240 mL / OUT: 351 mL / NET: -111 mL        PHYSICAL EXAM:  Vital Signs Last 24 Hrs  T(C): 36.4 (16 Sep 2019 09:15), Max: 37 (15 Sep 2019 16:44)  T(F): 97.6 (16 Sep 2019 09:15), Max: 98.6 (15 Sep 2019 16:44)  HR: 85 (16 Sep 2019 09:15) (80 - 95)  BP: 105/67 (16 Sep 2019 09:15) (105/67 - 125/66)  BP(mean): --  RR: 18 (16 Sep 2019 09:15) (18 - 19)  SpO2: 97% (16 Sep 2019 09:15) (94% - 100%)      GENERAL: NAD  HEAD:  Atraumatic, Normocephalic  EYES: EOMI, PERRL, conjunctiva and sclera clear  CHEST/LUNG: Clear to auscultation bilaterally; No wheeze  HEART: Regular rate and rhythm; No murmurs, rubs, or gallops  ABDOMEN: Soft, Nontender, Nondistended; Bowel sounds present  EXTREMITIES:  no tenderness to palpate in left thigh/hip area; 2+ Peripheral Pulses, No clubbing, cyanosis, or edema  NEUROLOGY: AAOx3; non-focal    LABS:                        7.4    8.62  )-----------( 253      ( 16 Sep 2019 09:42 )             24.8     09-16    141  |  107  |  8   ----------------------------<  94  3.7   |  25  |  0.39<L>    Ca    8.4      16 Sep 2019 07:31                  Care Discussed with Consultants/Other Providers:  ANEUDY Lombardo Patient is a 68y old  Female who presents with a chief complaint of Transfer from [facility] for UG[facility] (15 Sep 2019 13:48)      SUBJECTIVE / OVERNIGHT EVENTS:    patient seen and examined.  L eye pain. wants to see optho and wants to speak with PMR in regards to her spasms    on NC this am. no sob. on room air 94%    no abdominal pain, fevers or chills    ROS:  14 point ROS negative in detail except stated as above    MEDICATIONS  (STANDING):  artificial tears (preservative free) Ophthalmic Solution 1 Drop(s) Left EYE every 2 hours  ascorbic acid 500 milliGRAM(s) Oral daily  atorvastatin 40 milliGRAM(s) Oral at bedtime  baclofen 20 milliGRAM(s) Oral four times a day  DULoxetine 30 milliGRAM(s) Oral two times a day  gabapentin 800 milliGRAM(s) Oral three times a day  influenza   Vaccine 0.5 milliLiter(s) IntraMuscular once  lidocaine   Patch 1 Patch Transdermal daily  lidocaine   Patch 1 Patch Transdermal daily  meropenem  IVPB      meropenem  IVPB 1000 milliGRAM(s) IV Intermittent every 8 hours  multivitamin 1 Tablet(s) Oral daily  oxyCODONE    IR 5 milliGRAM(s) Oral two times a day  oxyCODONE    IR 10 milliGRAM(s) Oral at bedtime  pantoprazole    Tablet 40 milliGRAM(s) Oral two times a day  prednisoLONE acetate 1% Suspension 1 Drop(s) Left EYE four times a day  valACYclovir 1000 milliGRAM(s) Oral three times a day    MEDICATIONS  (PRN):  acetaminophen   Tablet .. 650 milliGRAM(s) Oral every 6 hours PRN Mild Pain (1 - 3), Moderate Pain (4 - 6)  ondansetron Injectable 4 milliGRAM(s) IV Push every 6 hours PRN Nausea and/or Vomiting  polyethylene glycol 3350 17 Gram(s) Oral every 12 hours PRN Constipation  zolpidem 5 milliGRAM(s) Oral at bedtime PRN Insomnia      CAPILLARY BLOOD GLUCOSE        I&O's Summary    15 Sep 2019 07:01  -  16 Sep 2019 07:00  --------------------------------------------------------  IN: 1710 mL / OUT: 2550 mL / NET: -840 mL    16 Sep 2019 07:01  -  16 Sep 2019 12:53  --------------------------------------------------------  IN: 240 mL / OUT: 351 mL / NET: -111 mL        PHYSICAL EXAM:  Vital Signs Last 24 Hrs  T(C): 36.4 (16 Sep 2019 09:15), Max: 37 (15 Sep 2019 16:44)  T(F): 97.6 (16 Sep 2019 09:15), Max: 98.6 (15 Sep 2019 16:44)  HR: 85 (16 Sep 2019 09:15) (80 - 95)  BP: 105/67 (16 Sep 2019 09:15) (105/67 - 125/66)  BP(mean): --  RR: 18 (16 Sep 2019 09:15) (18 - 19)  SpO2: 97% (16 Sep 2019 09:15) (94% - 100%)      GENERAL: NAD  HEAD:  Atraumatic, Normocephalic  EYES: EOMI, PERRL, conjunctiva and sclera clear  CHEST/LUNG: Clear to auscultation bilaterally; No wheeze  HEART: Regular rate and rhythm; No murmurs, rubs, or gallops  ABDOMEN: Soft, Nontender, Nondistended; Bowel sounds present  EXTREMITIES:  no tenderness to palpate in left thigh/hip area; 2+ Peripheral Pulses, No clubbing, cyanosis, or edema  NEUROLOGY: AAOx3; non-focal    LABS:                        7.4    8.62  )-----------( 253      ( 16 Sep 2019 09:42 )             24.8     09-16    141  |  107  |  8   ----------------------------<  94  3.7   |  25  |  0.39<L>    Ca    8.4      16 Sep 2019 07:31                  Care Discussed with Consultants/Other Providers:  ANEUDY Lombardo

## 2019-09-16 NOTE — PROGRESS NOTE ADULT - PROBLEM SELECTOR PLAN 8
-Continue with multi-modal pain management: Tylenol mild-mod pain, Morphine 2 q4h PRN severe pain, Baclofen 20 mg TID, Duloxetine 20 mg BID, and Gabapentin increased to 800 TID  -Patient has baclofen pump - due for refill 9/22  - oxycodone 5mg IR bid, oxycodone 10mg qhs, gabapentin increased to 800mg tid  - will reach out to Dr. Vieira's team today

## 2019-09-16 NOTE — PROGRESS NOTE ADULT - PROBLEM SELECTOR PLAN 6
-Patient had recent left sided nephrolithiasis with urethral stent placement  KUB showing rounded density over L utereter as seen on CT previously  urology follow up opt

## 2019-09-16 NOTE — PROGRESS NOTE ADULT - PROBLEM SELECTOR PLAN 7
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

## 2019-09-17 NOTE — CHART NOTE - NSCHARTNOTEFT_GEN_A_CORE
MICU Acceptance Note  69 y/o F with PMHx of aortic dissection (post-repair several years prior), spinal cord hematoma (now functionally paraplegic), chronic spasticity and pain (on Oxycodone and Baclofen pump), HTN, nephrolithiasis s/p left urethral stent, UTIs and endotheliitis/keratitis (post-corneal transplant), with recent hospitalizations at [facility] for UGIB with acute blood loss anemia requiring multiple PRBC transfusions. Patient was at home in early August 2019 when  thought she was not mentating well. He took her vitals at that time and found her to have a systolic BP in the 70s and HR in the 150s. When he brought her to [facility] they found her Hg to be 4.4. Unknown if she was having melena or hematochezia at that time. While she was there patient underwent four EGDs and received more than 10 units PRBC. While she was there patient underwent four EGDs and received more than 10 units PRBC. EGDs were unable to identify active source of bleeding and usually showed pooled blood. There is suspicion for dieulafoy, but unable to be acted upon at [place] with the multiple EGDs at times only showing adherent clot. Surgery consult recommended ex-lap given the severity, but patient's family deferred for now. Patient's PMD is Dr. Branham who recommended transfer to [facility] for evaluation by Dr. Ambrocio.     At [facility], EGD by Dr. Ambrocio on 9/5 which showed acute gastritis, gastric polyps with no source of GI bleed identified. Gastritis neg for H.Pylori. Enteroscopy 9/10 showed jejunum normal. small hiatal hernia. Emergent EGD was performed again on 9/13 but no evidence of active bleeding.  Of note, pt's hospital course c/b fevers up to 102.7F, leukocytosis positive UA concerning for UTI. Ucx (9/9) was positive for Pseudomonas and Klebsiella ESBL. Bacteruria cleared after 5 day course of meropenum.     On morning of 9/17, Pt was tachycardic w/ HR in the 140s and notes to be in pain despite receiving pain medication. NGT was placed and aspirated approx 2-3cc of bring red blood. H/H was 7/21.7. S/p 1u PRBC today (total 5U PRBC since transfer to [facility]). Pt was made NPO and underwent urgent EGD. Pt currently intubated after procedure and pending transfer to MICU.      MEDICATIONS  (STANDING):  artificial tears (preservative free) Ophthalmic Solution 1 Drop(s) Left EYE every 2 hours  ascorbic acid 500 milliGRAM(s) Oral daily  atorvastatin 40 milliGRAM(s) Oral at bedtime  baclofen 20 milliGRAM(s) Oral four times a day  DULoxetine 30 milliGRAM(s) Oral two times a day  gabapentin 800 milliGRAM(s) Oral three times a day  influenza   Vaccine 0.5 milliLiter(s) IntraMuscular once  lidocaine   Patch 1 Patch Transdermal daily  lidocaine   Patch 1 Patch Transdermal daily  meropenem  IVPB      meropenem  IVPB 1000 milliGRAM(s) IV Intermittent every 8 hours  multivitamin 1 Tablet(s) Oral daily  oxyCODONE    IR 5 milliGRAM(s) Oral two times a day  oxyCODONE    IR 10 milliGRAM(s) Oral at bedtime  pantoprazole    Tablet 40 milliGRAM(s) Oral two times a day  prednisoLONE acetate 1% Suspension 1 Drop(s) Left EYE four times a day  valACYclovir 1000 milliGRAM(s) Oral three times a day    MEDICATIONS  (PRN):  acetaminophen   Tablet .. 650 milliGRAM(s) Oral every 6 hours PRN Mild Pain (1 - 3), Moderate Pain (4 - 6)  ondansetron Injectable 4 milliGRAM(s) IV Push every 6 hours PRN Nausea and/or Vomiting  polyethylene glycol 3350 17 Gram(s) Oral every 12 hours PRN Constipation  zolpidem 5 milliGRAM(s) Oral at bedtime PRN Insomnia    CAPILLARY BLOOD GLUCOSE    I&O's Summary    16 Sep 2019 07:01  -  17 Sep 2019 07:00  --------------------------------------------------------  IN: 1420 mL / OUT: 1751 mL / NET: -331 mL      T(C): 36.4 (09-17-19 @ 07:30), Max: 36.7 (09-16-19 @ 21:00)  HR: 127 (09-17-19 @ 07:30) (89 - 145)  BP: 100/66 (09-17-19 @ 07:30) (77/51 - 158/82)  RR: 18 (09-17-19 @ 07:30) (17 - 19)  SpO2: 100% (09-17-19 @ 07:30) (95% - 100%)    PHYSICAL EXAM:   GENERAL:   HEENT:  Neck:  CHEST/LUNG:   HEART:   ABDOMEN:   EXTREMITIES:    PSYCH:   NEUROLOGY:   SKIN:     LABS:                  7.0    17.7  )-----------( 439      ( 17 Sep 2019 05:10 )             21.7     WBC Trend: 17.7<--, 12.1<--, 8.62<--  09-17    139  |  104  |  13  ----------------------------<  152<H>  3.4<L>   |  26  |  0.65    Ca    8.4      17 Sep 2019 05:10    Creatinine Trend: 0.65<--, 0.39<--, 0.60<--, 0.60<--, 0.70<--, 0.53<--    RADIOLOGY & ADDITIONAL TESTS:    ASSESSMENT & PLAN:   69 y/o F with PMHx of aortic dissection (post-repair several years prior), spinal cord hematoma (now functionally paraplegic), chronic spasticity and pain (on Oxycodone and Baclofen pump), HTN, nephrolithiasis s/p left urethral stent, UTIs and endotheliitis/keratitis (post-corneal transplant), with recent hospitalizations at [facility] for UGIB. s/p multiple PRBC transfusions and EGDs. CTA a/p  negative and unable to localize source of bleed.  Patient transferred to MICU for further management post EDG 9/17. MICU Acceptance Note  69 y/o F with PMHx of aortic dissection (post-repair several years prior), spinal cord hematoma (now functionally paraplegic), chronic spasticity and pain (on Oxycodone and Baclofen pump), HTN, nephrolithiasis s/p left urethral stent, UTIs and endotheliitis/keratitis (post-corneal transplant), with recent hospitalizations at [facility] for UGIB with acute blood loss anemia requiring multiple PRBC transfusions. Patient was at home in early August 2019 when  thought she was not mentating well. He took her vitals at that time and found her to have a systolic BP in the 70s and HR in the 150s. When he brought her to [facility] they found her Hg to be 4.4. Unknown if she was having melena or hematochezia at that time. While she was there patient underwent four EGDs and received more than 10 units PRBC. While she was there patient underwent four EGDs and received more than 10 units PRBC. EGDs were unable to identify active source of bleeding and usually showed pooled blood. There is suspicion for dieulafoy, but unable to be acted upon at [place] with the multiple EGDs at times only showing adherent clot. Surgery consult recommended ex-lap given the severity, but patient's family deferred for now. Patient's PMD is Dr. Branham who recommended transfer to [facility] for evaluation by Dr. Ambrocio.     At [facility], EGD by Dr. Ambrocio on 9/5 which showed acute gastritis, gastric polyps with no source of GI bleed identified. Gastritis neg for H.Pylori. Enteroscopy 9/10 showed jejunum normal. small hiatal hernia. Emergent EGD was performed again on 9/13 but no evidence of active bleeding.  Of note, pt's hospital course c/b fevers up to 102.7F, leukocytosis positive UA concerning for UTI. Ucx (9/9) was positive for Pseudomonas and Klebsiella ESBL. Bacteruria cleared after 5 day course of meropenum.     On morning of 9/17, Pt was tachycardic w/ HR in the 140s and notes to be in pain despite receiving pain medication. NGT was placed and aspirated approx 2-3cc of bring red blood. H/H was 7/21.7. S/p 1u PRBC today (total 5U PRBC since transfer to [facility]). Pt was made NPO and underwent urgent EGD. Pt currently intubated after procedure and pending transfer to MICU.      MEDICATIONS  (STANDING):  artificial tears (preservative free) Ophthalmic Solution 1 Drop(s) Left EYE every 2 hours  ascorbic acid 500 milliGRAM(s) Oral daily  atorvastatin 40 milliGRAM(s) Oral at bedtime  baclofen 20 milliGRAM(s) Oral four times a day  DULoxetine 30 milliGRAM(s) Oral two times a day  gabapentin 800 milliGRAM(s) Oral three times a day  influenza   Vaccine 0.5 milliLiter(s) IntraMuscular once  lidocaine   Patch 1 Patch Transdermal daily  lidocaine   Patch 1 Patch Transdermal daily  meropenem  IVPB      meropenem  IVPB 1000 milliGRAM(s) IV Intermittent every 8 hours  multivitamin 1 Tablet(s) Oral daily  oxyCODONE    IR 5 milliGRAM(s) Oral two times a day  oxyCODONE    IR 10 milliGRAM(s) Oral at bedtime  pantoprazole    Tablet 40 milliGRAM(s) Oral two times a day  prednisoLONE acetate 1% Suspension 1 Drop(s) Left EYE four times a day  valACYclovir 1000 milliGRAM(s) Oral three times a day    MEDICATIONS  (PRN):  acetaminophen   Tablet .. 650 milliGRAM(s) Oral every 6 hours PRN Mild Pain (1 - 3), Moderate Pain (4 - 6)  ondansetron Injectable 4 milliGRAM(s) IV Push every 6 hours PRN Nausea and/or Vomiting  polyethylene glycol 3350 17 Gram(s) Oral every 12 hours PRN Constipation  zolpidem 5 milliGRAM(s) Oral at bedtime PRN Insomnia    CAPILLARY BLOOD GLUCOSE    I&O's Summary    16 Sep 2019 07:01  -  17 Sep 2019 07:00  --------------------------------------------------------  IN: 1420 mL / OUT: 1751 mL / NET: -331 mL      T(C): 36.4 (09-17-19 @ 07:30), Max: 36.7 (09-16-19 @ 21:00)  HR: 127 (09-17-19 @ 07:30) (89 - 145)  BP: 100/66 (09-17-19 @ 07:30) (77/51 - 158/82)  RR: 18 (09-17-19 @ 07:30) (17 - 19)  SpO2: 100% (09-17-19 @ 07:30) (95% - 100%)    PHYSICAL EXAM:   GENERAL:   HEENT:  Neck:  CHEST/LUNG:   HEART:   ABDOMEN:   EXTREMITIES:    PSYCH:   NEUROLOGY:   SKIN:     LABS:                  7.0    17.7  )-----------( 439      ( 17 Sep 2019 05:10 )             21.7     WBC Trend: 17.7<--, 12.1<--, 8.62<--  09-17    139  |  104  |  13  ----------------------------<  152<H>  3.4<L>   |  26  |  0.65    Ca    8.4      17 Sep 2019 05:10    Creatinine Trend: 0.65<--, 0.39<--, 0.60<--, 0.60<--, 0.70<--, 0.53<--    RADIOLOGY & ADDITIONAL TESTS:    ASSESSMENT & PLAN:   69 y/o F with PMHx of aortic dissection (post-repair several years prior), spinal cord hematoma (now functionally paraplegic), chronic spasticity and pain (on Oxycodone and Baclofen pump), HTN, nephrolithiasis s/p left urethral stent, UTIs and endotheliitis/keratitis (post-corneal transplant), with recent hospitalizations at [facility] for UGIB. s/p multiple PRBC transfusions and EGDs. CTA a/p  negative and unable to localize source of bleed.  Patient transferred to MICU for further management post EDG 9/17.    # Neuro:       # CV:        # Resp:       # GI:      # Renal:      # :      # ID:      # DVT ppx: MICU Acceptance Note  67 y/o F with PMHx of aortic dissection (post-repair several years prior), spinal cord hematoma (now functionally paraplegic), chronic spasticity and pain (on Oxycodone and Baclofen pump), HTN, nephrolithiasis s/p left urethral stent, UTIs and endotheliitis/keratitis (post-corneal transplant), with recent hospitalizations at [facility] for UGIB with acute blood loss anemia requiring multiple PRBC transfusions. Patient was at home in early August 2019 when  thought she was not mentating well. He took her vitals at that time and found her to have a systolic BP in the 70s and HR in the 150s. When he brought her to [facility] they found her Hg to be 4.4. Unknown if she was having melena or hematochezia at that time. While she was there patient underwent four EGDs and received more than 10 units PRBC. While she was there patient underwent four EGDs and received more than 10 units PRBC. EGDs were unable to identify active source of bleeding and usually showed pooled blood. There is suspicion for dieulafoy, but unable to be acted upon at [place] with the multiple EGDs at times only showing adherent clot. Surgery consult recommended ex-lap given the severity, but patient's family deferred for now. Patient's PMD is Dr. Branham who recommended transfer to [facility] for evaluation by Dr. Ambrocio.     At [facility], EGD by Dr. Ambrocio on 9/5 which showed acute gastritis, gastric polyps with no source of GI bleed identified. Gastritis neg for H.Pylori. Enteroscopy 9/10 showed jejunum normal. small hiatal hernia. PillCam (VCE) study performed 9/11 showed active bleeding with large amount noted.  Bleeding site appeared to be in the distal aspect of the stomach. Emergent EGD was performed again on 9/13 after pt had bloody BM O/N but showedf no evidence of active bleeding.  Of note, pt's hospital course c/b fevers up to 102.7F, leukocytosis positive UA concerning for UTI. Ucx (9/9) was positive for Pseudomonas and Klebsiella ESBL. Bacteruria cleared after 5 day course of meropenum. On morning of 9/17, Pt was tachycardic w/ HR in the 140s and notes to be in pain despite receiving pain medication. NGT was placed and aspirated approx 2-3cc of bring red blood. H/H was 7/21.7. S/p 1u PRBC today (total 5U PRBC since transfer to [facility]). Pt was made NPO and underwent urgent EGD. Pt currently intubated after procedure and pending transfer to MICU.      MEDICATIONS  (STANDING):  artificial tears (preservative free) Ophthalmic Solution 1 Drop(s) Left EYE every 2 hours  ascorbic acid 500 milliGRAM(s) Oral daily  atorvastatin 40 milliGRAM(s) Oral at bedtime  baclofen 20 milliGRAM(s) Oral four times a day  DULoxetine 30 milliGRAM(s) Oral two times a day  gabapentin 800 milliGRAM(s) Oral three times a day  influenza   Vaccine 0.5 milliLiter(s) IntraMuscular once  lidocaine   Patch 1 Patch Transdermal daily  lidocaine   Patch 1 Patch Transdermal daily  meropenem  IVPB      meropenem  IVPB 1000 milliGRAM(s) IV Intermittent every 8 hours  multivitamin 1 Tablet(s) Oral daily  oxyCODONE    IR 5 milliGRAM(s) Oral two times a day  oxyCODONE    IR 10 milliGRAM(s) Oral at bedtime  pantoprazole    Tablet 40 milliGRAM(s) Oral two times a day  prednisoLONE acetate 1% Suspension 1 Drop(s) Left EYE four times a day  valACYclovir 1000 milliGRAM(s) Oral three times a day    MEDICATIONS  (PRN):  acetaminophen   Tablet .. 650 milliGRAM(s) Oral every 6 hours PRN Mild Pain (1 - 3), Moderate Pain (4 - 6)  ondansetron Injectable 4 milliGRAM(s) IV Push every 6 hours PRN Nausea and/or Vomiting  polyethylene glycol 3350 17 Gram(s) Oral every 12 hours PRN Constipation  zolpidem 5 milliGRAM(s) Oral at bedtime PRN Insomnia    CAPILLARY BLOOD GLUCOSE    I&O's Summary    16 Sep 2019 07:01  -  17 Sep 2019 07:00  --------------------------------------------------------  IN: 1420 mL / OUT: 1751 mL / NET: -331 mL      T(C): 36.4 (09-17-19 @ 07:30), Max: 36.7 (09-16-19 @ 21:00)  HR: 127 (09-17-19 @ 07:30) (89 - 145)  BP: 100/66 (09-17-19 @ 07:30) (77/51 - 158/82)  RR: 18 (09-17-19 @ 07:30) (17 - 19)  SpO2: 100% (09-17-19 @ 07:30) (95% - 100%)    PHYSICAL EXAM:   GENERAL:   HEENT:  Neck:  CHEST/LUNG:   HEART:   ABDOMEN:   EXTREMITIES:    PSYCH:   NEUROLOGY:   SKIN:     LABS:                  7.0    17.7  )-----------( 439      ( 17 Sep 2019 05:10 )             21.7     WBC Trend: 17.7<--, 12.1<--, 8.62<--  09-17    139  |  104  |  13  ----------------------------<  152<H>  3.4<L>   |  26  |  0.65    Ca    8.4      17 Sep 2019 05:10    Creatinine Trend: 0.65<--, 0.39<--, 0.60<--, 0.60<--, 0.70<--, 0.53<--    RADIOLOGY & ADDITIONAL TESTS:    ASSESSMENT & PLAN:   67 y/o F with PMHx of aortic dissection (post-repair several years prior), spinal cord hematoma (now functionally paraplegic), chronic spasticity and pain (on Oxycodone and Baclofen pump), HTN, nephrolithiasis s/p left urethral stent, UTIs and endotheliitis/keratitis (post-corneal transplant), with recent hospitalizations at [facility] for UGIB. s/p multiple PRBC transfusions and EGDs. CTA a/p  negative and unable to localize source of bleed.  Patient transferred to MICU for further management post EDG 9/17.    # Neuro:       # CV:        # Resp:       # GI:      # Renal:      # :      # ID:      # DVT ppx: MICU Acceptance Note  67 y/o F with PMHx of aortic dissection (post-repair several years prior), spinal cord hematoma (now functionally paraplegic), chronic spasticity and pain (on Oxycodone and Baclofen pump), HTN, nephrolithiasis s/p left urethral stent, UTIs and endotheliitis/keratitis (post-corneal transplant), with recent hospitalizations at [facility] for UGIB with acute blood loss anemia requiring multiple PRBC transfusions. Patient was at home in early August 2019 when  thought she was not mentating well. He took her vitals at that time and found her to have a systolic BP in the 70s and HR in the 150s. When he brought her to [facility] they found her Hg to be 4.4. Unknown if she was having melena or hematochezia at that time. While she was there patient underwent four EGDs and received more than 10 units PRBC. While she was there patient underwent four EGDs and received more than 10 units PRBC. EGDs were unable to identify active source of bleeding and usually showed pooled blood. There is suspicion for dieulafoy, but unable to be acted upon at [place] with the multiple EGDs at times only showing adherent clot. Surgery consult recommended ex-lap given the severity, but patient's family deferred for now. Patient's PMD is Dr. Branham who recommended transfer to [facility] for evaluation by Dr. Ambrocio.     At [facility], EGD by Dr. Ambrocio on 9/5 which showed acute gastritis, gastric polyps with no source of GI bleed identified. Gastritis neg for H.Pylori. Enteroscopy 9/10 showed jejunum normal. small hiatal hernia. PillCam (VCE) study performed 9/11 showed active bleeding with large amount noted.  Bleeding site appeared to be in the distal aspect of the stomach. Emergent EGD was performed again on 9/13 after pt had bloody BM O/N but showedf no evidence of active bleeding.  On morning of 9/17, Pt was tachycardic w/ HR in the 140s and notes to be in pain despite receiving pain medication. NGT was placed and aspirated approx 2-3cc of bring red blood. H/H was 7/21.7. S/p 1u PRBC today (total 5U PRBC since transfer to [facility]). Pt was made NPO and underwent urgent EGD. Pt currently intubated after procedure and pending transfer to MICU.      Of note, pt's hospital course c/b fevers up to 102.7F, leukocytosis positive UA concerning for UTI. Ucx (9/9) was positive for Pseudomonas and Klebsiella ESBL. Bacteruria cleared after 5 day course of meropenum. 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 Baclofen pump due for refill 9/22.     MEDICATIONS  (STANDING):  artificial tears (preservative free) Ophthalmic Solution 1 Drop(s) Left EYE every 2 hours  ascorbic acid 500 milliGRAM(s) Oral daily  atorvastatin 40 milliGRAM(s) Oral at bedtime  baclofen 20 milliGRAM(s) Oral four times a day  DULoxetine 30 milliGRAM(s) Oral two times a day  gabapentin 800 milliGRAM(s) Oral three times a day  influenza   Vaccine 0.5 milliLiter(s) IntraMuscular once  lidocaine   Patch 1 Patch Transdermal daily  lidocaine   Patch 1 Patch Transdermal daily  meropenem  IVPB      meropenem  IVPB 1000 milliGRAM(s) IV Intermittent every 8 hours  multivitamin 1 Tablet(s) Oral daily  oxyCODONE    IR 5 milliGRAM(s) Oral two times a day  oxyCODONE    IR 10 milliGRAM(s) Oral at bedtime  pantoprazole    Tablet 40 milliGRAM(s) Oral two times a day  prednisoLONE acetate 1% Suspension 1 Drop(s) Left EYE four times a day  valACYclovir 1000 milliGRAM(s) Oral three times a day    MEDICATIONS  (PRN):  acetaminophen   Tablet .. 650 milliGRAM(s) Oral every 6 hours PRN Mild Pain (1 - 3), Moderate Pain (4 - 6)  ondansetron Injectable 4 milliGRAM(s) IV Push every 6 hours PRN Nausea and/or Vomiting  polyethylene glycol 3350 17 Gram(s) Oral every 12 hours PRN Constipation  zolpidem 5 milliGRAM(s) Oral at bedtime PRN Insomnia    CAPILLARY BLOOD GLUCOSE    I&O's Summary    16 Sep 2019 07:01  -  17 Sep 2019 07:00  --------------------------------------------------------  IN: 1420 mL / OUT: 1751 mL / NET: -331 mL      T(C): 36.4 (09-17-19 @ 07:30), Max: 36.7 (09-16-19 @ 21:00)  HR: 127 (09-17-19 @ 07:30) (89 - 145)  BP: 100/66 (09-17-19 @ 07:30) (77/51 - 158/82)  RR: 18 (09-17-19 @ 07:30) (17 - 19)  SpO2: 100% (09-17-19 @ 07:30) (95% - 100%)    PHYSICAL EXAM:   GENERAL:   HEENT:  Neck:  CHEST/LUNG:   HEART:   ABDOMEN:   EXTREMITIES:    PSYCH:   NEUROLOGY:   SKIN:     LABS:                  7.0    17.7  )-----------( 439      ( 17 Sep 2019 05:10 )             21.7     WBC Trend: 17.7<--, 12.1<--, 8.62<--  09-17    139  |  104  |  13  ----------------------------<  152<H>  3.4<L>   |  26  |  0.65    Ca    8.4      17 Sep 2019 05:10    Creatinine Trend: 0.65<--, 0.39<--, 0.60<--, 0.60<--, 0.70<--, 0.53<--    RADIOLOGY & ADDITIONAL TESTS:    ASSESSMENT & PLAN:   67 y/o F with PMHx of aortic dissection (post-repair several years prior), spinal cord hematoma (now functionally paraplegic), chronic spasticity and pain (on Oxycodone and Baclofen pump), HTN, nephrolithiasis s/p left urethral stent, UTIs and endotheliitis/keratitis (post-corneal transplant), with recent hospitalizations at [facility] for UGIB. s/p multiple PRBC transfusions and EGDs. CTA a/p  negative and unable to localize source of bleed.  Patient transferred to MICU for further management post EDG 9/17.    # Neuro:       # CV:        # Resp:       # GI:      # Renal:      # :      # ID:      # DVT ppx: MICU Acceptance Note  69 y/o F with PMHx of aortic dissection (post-repair several years prior), spinal cord hematoma (now functionally paraplegic), chronic spasticity and pain (on Oxycodone and Baclofen pump), HTN, nephrolithiasis s/p left urethral stent, UTIs and endotheliitis/keratitis (post-corneal transplant), with recent hospitalizations at [facility] for UGIB with acute blood loss anemia requiring multiple PRBC transfusions. Patient was at home in early August 2019 when  thought she was not mentating well. He took her vitals at that time and found her to have a systolic BP in the 70s and HR in the 150s. When he brought her to [facility] they found her Hg to be 4.4. Unknown if she was having melena or hematochezia at that time. While she was there patient underwent four EGDs and received more than 10 units PRBC. While she was there patient underwent four EGDs and received more than 10 units PRBC. EGDs were unable to identify active source of bleeding and usually showed pooled blood. There is suspicion for dieulafoy, but unable to be acted upon at [place] with the multiple EGDs at times only showing adherent clot. Surgery consult recommended ex-lap given the severity, but patient's family deferred for now. Patient's PMD is Dr. Branham who recommended transfer to [facility] for evaluation by Dr. Ambrocio.     At [facility], EGD by Dr. Ambrocio on 9/5 which showed acute gastritis, gastric polyps with no source of GI bleed identified. Gastritis neg for H.Pylori. Enteroscopy 9/10 showed jejunum normal. small hiatal hernia. PillCam (VCE) study performed 9/11 showed active bleeding with large amount noted.  Bleeding site appeared to be in the distal aspect of the stomach. Emergent EGD was performed again on 9/13 after pt had bloody BM O/N but showedf no evidence of active bleeding.  On morning of 9/17, Pt was tachycardic w/ HR in the 140s and notes to be in pain despite receiving pain medication. NGT was placed and aspirated approx 2-3cc of bring red blood. H/H was 7/21.7. S/p 2u PRBC today. Pt was made NPO and underwent urgent EGD. Pt currently intubated after procedure and pending transfer to MICU.      Of note, pt's hospital course c/b fevers up to 102.7F, leukocytosis positive UA concerning for UTI. Ucx (9/9) was positive for Pseudomonas and Klebsiella ESBL. Bacteruria cleared after 5 day course of meropenum. 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 Baclofen pump due for refill 9/22.     MEDICATIONS  (STANDING):  artificial tears (preservative free) Ophthalmic Solution 1 Drop(s) Left EYE every 2 hours  ascorbic acid 500 milliGRAM(s) Oral daily  atorvastatin 40 milliGRAM(s) Oral at bedtime  baclofen 20 milliGRAM(s) Oral four times a day  DULoxetine 30 milliGRAM(s) Oral two times a day  gabapentin 800 milliGRAM(s) Oral three times a day  influenza   Vaccine 0.5 milliLiter(s) IntraMuscular once  lidocaine   Patch 1 Patch Transdermal daily  lidocaine   Patch 1 Patch Transdermal daily  meropenem  IVPB      meropenem  IVPB 1000 milliGRAM(s) IV Intermittent every 8 hours  multivitamin 1 Tablet(s) Oral daily  oxyCODONE    IR 5 milliGRAM(s) Oral two times a day  oxyCODONE    IR 10 milliGRAM(s) Oral at bedtime  pantoprazole    Tablet 40 milliGRAM(s) Oral two times a day  prednisoLONE acetate 1% Suspension 1 Drop(s) Left EYE four times a day  valACYclovir 1000 milliGRAM(s) Oral three times a day    MEDICATIONS  (PRN):  acetaminophen   Tablet .. 650 milliGRAM(s) Oral every 6 hours PRN Mild Pain (1 - 3), Moderate Pain (4 - 6)  ondansetron Injectable 4 milliGRAM(s) IV Push every 6 hours PRN Nausea and/or Vomiting  polyethylene glycol 3350 17 Gram(s) Oral every 12 hours PRN Constipation  zolpidem 5 milliGRAM(s) Oral at bedtime PRN Insomnia    CAPILLARY BLOOD GLUCOSE    I&O's Summary    16 Sep 2019 07:01  -  17 Sep 2019 07:00  --------------------------------------------------------  IN: 1420 mL / OUT: 1751 mL / NET: -331 mL      T(C): 36.4 (09-17-19 @ 07:30), Max: 36.7 (09-16-19 @ 21:00)  HR: 127 (09-17-19 @ 07:30) (89 - 145)  BP: 100/66 (09-17-19 @ 07:30) (77/51 - 158/82)  RR: 18 (09-17-19 @ 07:30) (17 - 19)  SpO2: 100% (09-17-19 @ 07:30) (95% - 100%)    PHYSICAL EXAM:   GENERAL:   HEENT:  Neck:  CHEST/LUNG:   HEART:   ABDOMEN:   EXTREMITIES:    PSYCH:   NEUROLOGY:   SKIN:     LABS:                  7.0    17.7  )-----------( 439      ( 17 Sep 2019 05:10 )             21.7     WBC Trend: 17.7<--, 12.1<--, 8.62<--  09-17    139  |  104  |  13  ----------------------------<  152<H>  3.4<L>   |  26  |  0.65    Ca    8.4      17 Sep 2019 05:10    Creatinine Trend: 0.65<--, 0.39<--, 0.60<--, 0.60<--, 0.70<--, 0.53<--    RADIOLOGY & ADDITIONAL TESTS:    ASSESSMENT & PLAN:   69 y/o F with PMHx of aortic dissection (post-repair several years prior), spinal cord hematoma (now functionally paraplegic), chronic spasticity and pain (on Oxycodone and Baclofen pump), HTN, nephrolithiasis s/p left urethral stent, UTIs and endotheliitis/keratitis (post-corneal transplant), with recent hospitalizations at [facility] for UGIB. s/p multiple PRBC transfusions and EGDs. CTA a/p  negative and unable to localize source of bleed.  Patient transferred to MICU for further management post EDG 9/17.    # Neuro:   - intubated   - Baseline:   - chronic pain ->       # CV:  - hx of aortic dissection -> post repair and appears stable on recent CTA performed at [place]        # Resp:      # GI:  - EGD 9/17 -> no active bleeding site found  - Transfer to ICU w/ frequent H/H, active type and screen   - keep NPO  - If sign of bleeding, urgent GI consult for possible CT angio, IR intervention or repeat EGD.    # Renal:  - Creatinine 0.65, will keep monitor   -no active issues    # :  - Ucx (9/9) was positive for Pseudomonas and Klebsiella ESBL s/p meropenum x5day. Most recent Ucx neg.       # ID:  - Ucx (9/9) was positive for Pseudomonas and Klebsiella ESBL s/p meropenum x5day. Most recent Ucx neg.  - Blood cx (9/14) NGTD   - Afebrile in past 24hrs, WBC uptrending (8.6->12.1->17.7)   - continue to monitor VS, WBC     - keratitis: possibly secondary to HSV or CMV, but  can only say "a virus" and now s/p corneal transplant which is failing according to opt optho, continued L eye pain     -Continue with Valtrex for viral suppression    -Continue with Prednisolone eye drop into left eye    -Daily artificial tears    -f/u  optho recs    # DVT ppx: MICU Acceptance Note  67 y/o F with PMHx of aortic dissection (post-repair several years prior), spinal cord hematoma (now functionally paraplegic), chronic spasticity and pain (on Oxycodone and Baclofen pump), HTN, nephrolithiasis s/p left urethral stent, UTIs and endotheliitis/keratitis (post-corneal transplant), with recent hospitalizations at [facility] for UGIB with acute blood loss anemia requiring multiple PRBC transfusions. Patient was at home in early August 2019 when  thought she was not mentating well. He took her vitals at that time and found her to have a systolic BP in the 70s and HR in the 150s. When he brought her to [facility] they found her Hg to be 4.4. Unknown if she was having melena or hematochezia at that time. While she was there patient underwent four EGDs and received more than 10 units PRBC. While she was there patient underwent four EGDs and received more than 10 units PRBC. EGDs were unable to identify active source of bleeding and usually showed pooled blood. There is suspicion for dieulafoy, but unable to be acted upon at [place] with the multiple EGDs at times only showing adherent clot. Surgery consult recommended ex-lap given the severity, but patient's family deferred for now. Patient's PMD is Dr. Branham who recommended transfer to [facility] for evaluation by Dr. Ambrocio.     At [facility], EGD by Dr. Ambrocio on 9/5 which showed acute gastritis, gastric polyps with no source of GI bleed identified. Gastritis neg for H.Pylori. Enteroscopy 9/10 showed jejunum normal. small hiatal hernia. PillCam (VCE) study performed 9/11 showed active bleeding with large amount noted.  Bleeding site appeared to be in the distal aspect of the stomach. Emergent EGD was performed again on 9/13 after pt had bloody BM O/N but showedf no evidence of active bleeding.  On morning of 9/17, Pt was tachycardic w/ HR in the 140s and notes to be in pain despite receiving pain medication. NGT was placed and aspirated approx 2-3cc of bring red blood. H/H was 7/21.7. S/p 2u PRBC today. Pt was made NPO and underwent urgent EGD. Pt currently intubated after procedure and pending transfer to MICU.      Of note, pt's hospital course c/b fevers up to 102.7F, leukocytosis positive UA concerning for UTI. Ucx (9/9) was positive for Pseudomonas and Klebsiella ESBL. Bacteruria cleared after 5 day course of meropenum. 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 Baclofen pump due for refill 9/22.     MEDICATIONS  (STANDING):  artificial tears (preservative free) Ophthalmic Solution 1 Drop(s) Left EYE every 2 hours  ascorbic acid 500 milliGRAM(s) Oral daily  atorvastatin 40 milliGRAM(s) Oral at bedtime  baclofen 20 milliGRAM(s) Oral four times a day  DULoxetine 30 milliGRAM(s) Oral two times a day  gabapentin 800 milliGRAM(s) Oral three times a day  influenza   Vaccine 0.5 milliLiter(s) IntraMuscular once  lidocaine   Patch 1 Patch Transdermal daily  lidocaine   Patch 1 Patch Transdermal daily  meropenem  IVPB      meropenem  IVPB 1000 milliGRAM(s) IV Intermittent every 8 hours  multivitamin 1 Tablet(s) Oral daily  oxyCODONE    IR 5 milliGRAM(s) Oral two times a day  oxyCODONE    IR 10 milliGRAM(s) Oral at bedtime  pantoprazole    Tablet 40 milliGRAM(s) Oral two times a day  prednisoLONE acetate 1% Suspension 1 Drop(s) Left EYE four times a day  valACYclovir 1000 milliGRAM(s) Oral three times a day    MEDICATIONS  (PRN):  acetaminophen   Tablet .. 650 milliGRAM(s) Oral every 6 hours PRN Mild Pain (1 - 3), Moderate Pain (4 - 6)  ondansetron Injectable 4 milliGRAM(s) IV Push every 6 hours PRN Nausea and/or Vomiting  polyethylene glycol 3350 17 Gram(s) Oral every 12 hours PRN Constipation  zolpidem 5 milliGRAM(s) Oral at bedtime PRN Insomnia    CAPILLARY BLOOD GLUCOSE    I&O's Summary    16 Sep 2019 07:01  -  17 Sep 2019 07:00  --------------------------------------------------------  IN: 1420 mL / OUT: 1751 mL / NET: -331 mL      T(C): 36.4 (09-17-19 @ 07:30), Max: 36.7 (09-16-19 @ 21:00)  HR: 127 (09-17-19 @ 07:30) (89 - 145)  BP: 100/66 (09-17-19 @ 07:30) (77/51 - 158/82)  RR: 18 (09-17-19 @ 07:30) (17 - 19)  SpO2: 100% (09-17-19 @ 07:30) (95% - 100%)    PHYSICAL EXAM:   GENERAL:   HEENT:  Neck:  CHEST/LUNG:   HEART:   ABDOMEN:   EXTREMITIES:    PSYCH:   NEUROLOGY:   SKIN:     LABS:                  7.0    17.7  )-----------( 439      ( 17 Sep 2019 05:10 )             21.7     WBC Trend: 17.7<--, 12.1<--, 8.62<--  09-17    139  |  104  |  13  ----------------------------<  152<H>  3.4<L>   |  26  |  0.65    Ca    8.4      17 Sep 2019 05:10    Creatinine Trend: 0.65<--, 0.39<--, 0.60<--, 0.60<--, 0.70<--, 0.53<--    RADIOLOGY & ADDITIONAL TESTS:    ASSESSMENT & PLAN:   67 y/o F with PMHx of aortic dissection (post-repair several years prior), spinal cord hematoma (now functionally paraplegic), chronic spasticity and pain (on Oxycodone and Baclofen pump), HTN, nephrolithiasis s/p left urethral stent, UTIs and endotheliitis/keratitis (post-corneal transplant), with recent hospitalizations at [facility] for UGIB. s/p multiple PRBC transfusions and EGDs. CTA a/p  negative and unable to localize source of bleed.  Patient transferred to MICU for further management post EDG 9/17.    # Neuro:   - currently intubated -> possible extubation later today  - Baseline:   - chronic pain ->       # CV:  - hx of aortic dissection -> post repair and appears stable on recent CTA performed at [place]    # Resp:  - currently intubated -> possible extubation later today    # GI:  - EGD 9/17 -> no active bleeding site found  - Transfer to ICU w/ frequent H/H (CBC q4-q6), active type and screen   - keep NPO  - If sign of bleeding, urgent GI consult for possible CT angio, IR intervention or repeat EGD.    # Renal:  - Creatinine 0.65, will keep monitor   - no active issues    # :  - Ucx (9/9) was positive for Pseudomonas and Klebsiella ESBL s/p meropenum x5day. Most recent Ucx neg.       # ID:  - Ucx (9/9) was positive for Pseudomonas and Klebsiella ESBL s/p meropenum x5day. Most recent Ucx neg.  - Blood cx (9/14) NGTD   - Afebrile in past 24hrs, WBC uptrending (8.6->12.1->17.7)   - continue to monitor VS, WBC     - keratitis: possibly secondary to HSV or CMV, but  can only say "a virus" and now s/p corneal transplant which is failing according to opt optho, continued L eye pain     -Continue with Valtrex for viral suppression    -Continue with Prednisolone eye drop into left eye    -Daily artificial tears    -f/u  optho recs    # DVT ppx: MICU Acceptance Note  69 y/o F with PMHx of aortic dissection (post-repair several years prior), spinal cord hematoma (now functionally paraplegic), chronic spasticity and pain (on Oxycodone and Baclofen pump), HTN, nephrolithiasis s/p left urethral stent, UTIs and endotheliitis/keratitis (post-corneal transplant), with recent hospitalizations at [facility] for UGIB with acute blood loss anemia requiring multiple PRBC transfusions. Patient was at home in early August 2019 when  thought she was not mentating well. He took her vitals at that time and found her to have a systolic BP in the 70s and HR in the 150s. When he brought her to [facility] they found her Hg to be 4.4. Unknown if she was having melena or hematochezia at that time. While she was there patient underwent four EGDs and received more than 10 units PRBC. While she was there patient underwent four EGDs and received more than 10 units PRBC. EGDs were unable to identify active source of bleeding and usually showed pooled blood. There is suspicion for dieulafoy, but unable to be acted upon at [place] with the multiple EGDs at times only showing adherent clot. Surgery consult recommended ex-lap given the severity, but patient's family deferred for now. Patient's PMD is Dr. Branham who recommended transfer to [facility] for evaluation by Dr. Ambrocio.     At [facility], EGD by Dr. Ambrocio on 9/5 which showed acute gastritis, gastric polyps with no source of GI bleed identified. Gastritis neg for H.Pylori. Enteroscopy 9/10 showed jejunum normal. small hiatal hernia. PillCam (VCE) study performed 9/11 showed active bleeding with large amount noted.  Bleeding site appeared to be in the distal aspect of the stomach. Emergent EGD was performed again on 9/13 after pt had bloody BM O/N but showedf no evidence of active bleeding.  On morning of 9/17, Pt was tachycardic w/ HR in the 140s and notes to be in pain despite receiving pain medication. NGT was placed and aspirated approx 2-3cc of bring red blood. H/H was 7/21.7. S/p 2u PRBC today. Pt was made NPO and underwent urgent EGD. Pt currently intubated after procedure and pending transfer to MICU.      Of note, pt's hospital course c/b fevers up to 102.7F, leukocytosis positive UA concerning for UTI. Ucx (9/9) was positive for Pseudomonas and Klebsiella ESBL. Bacteruria cleared after 5 day course of meropenum. 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 Baclofen pump due for refill 9/22.     MEDICATIONS  (STANDING):  artificial tears (preservative free) Ophthalmic Solution 1 Drop(s) Left EYE every 2 hours  ascorbic acid 500 milliGRAM(s) Oral daily  atorvastatin 40 milliGRAM(s) Oral at bedtime  baclofen 20 milliGRAM(s) Oral four times a day  DULoxetine 30 milliGRAM(s) Oral two times a day  gabapentin 800 milliGRAM(s) Oral three times a day  influenza   Vaccine 0.5 milliLiter(s) IntraMuscular once  lidocaine   Patch 1 Patch Transdermal daily  lidocaine   Patch 1 Patch Transdermal daily  meropenem  IVPB      meropenem  IVPB 1000 milliGRAM(s) IV Intermittent every 8 hours  multivitamin 1 Tablet(s) Oral daily  oxyCODONE    IR 5 milliGRAM(s) Oral two times a day  oxyCODONE    IR 10 milliGRAM(s) Oral at bedtime  pantoprazole    Tablet 40 milliGRAM(s) Oral two times a day  prednisoLONE acetate 1% Suspension 1 Drop(s) Left EYE four times a day  valACYclovir 1000 milliGRAM(s) Oral three times a day    MEDICATIONS  (PRN):  acetaminophen   Tablet .. 650 milliGRAM(s) Oral every 6 hours PRN Mild Pain (1 - 3), Moderate Pain (4 - 6)  ondansetron Injectable 4 milliGRAM(s) IV Push every 6 hours PRN Nausea and/or Vomiting  polyethylene glycol 3350 17 Gram(s) Oral every 12 hours PRN Constipation  zolpidem 5 milliGRAM(s) Oral at bedtime PRN Insomnia    CAPILLARY BLOOD GLUCOSE    I&O's Summary    16 Sep 2019 07:01  -  17 Sep 2019 07:00  --------------------------------------------------------  IN: 1420 mL / OUT: 1751 mL / NET: -331 mL      T(C): 36.4 (09-17-19 @ 07:30), Max: 36.7 (09-16-19 @ 21:00)  HR: 127 (09-17-19 @ 07:30) (89 - 145)  BP: 100/66 (09-17-19 @ 07:30) (77/51 - 158/82)  RR: 18 (09-17-19 @ 07:30) (17 - 19)  SpO2: 100% (09-17-19 @ 07:30) (95% - 100%)    PHYSICAL EXAM:   GENERAL:   HEENT:  Neck:  CHEST/LUNG:   HEART:   ABDOMEN:   EXTREMITIES:    PSYCH:   NEUROLOGY:   SKIN:     LABS:                  7.0    17.7  )-----------( 439      ( 17 Sep 2019 05:10 )             21.7     WBC Trend: 17.7<--, 12.1<--, 8.62<--  09-17    139  |  104  |  13  ----------------------------<  152<H>  3.4<L>   |  26  |  0.65    Ca    8.4      17 Sep 2019 05:10    Creatinine Trend: 0.65<--, 0.39<--, 0.60<--, 0.60<--, 0.70<--, 0.53<--    RADIOLOGY & ADDITIONAL TESTS:    ASSESSMENT & PLAN:   69 y/o F with PMHx of aortic dissection (post-repair several years prior), spinal cord hematoma (now functionally paraplegic), chronic spasticity and pain (on Oxycodone and Baclofen pump), HTN, nephrolithiasis s/p left urethral stent, UTIs and endotheliitis/keratitis (post-corneal transplant), with recent hospitalizations at [facility] for UGIB. s/p multiple PRBC transfusions and EGDs. CTA a/p  negative and unable to localize source of bleed.  Patient transferred to MICU for further management post EDG 9/17.    # Neuro:   - currently intubated -> possible extubation later today  - Baseline:   - chronic pain ->   - Paraplegia 2/2 spinal hematoma    # CV:  - hx of aortic dissection -> post repair and appears stable on recent CTA performed at [place]    # Resp:  - currently intubated -> possible extubation later today    # GI:  - EGD 9/17 -> no active bleeding site found  - Transfer to ICU w/ frequent H/H (CBC q4-q6), active type and screen   - keep NPO  - If sign of bleeding, urgent GI consult for possible CT angio, IR intervention or repeat EGD.    # Renal  - Creatinine 0.65, will keep monitor   - no active issues    # :  - Ucx (9/9) was positive for Pseudomonas and Klebsiella ESBL s/p meropenum x5day. Most recent Ucx neg.   - paraplegia: straight cath (patient has chronic urinary retention and is cathed by  at home) q4hr  - Miralax for chronic stool retention (does periodic manual disimpactions at home)    # ID:  - Ucx (9/9) was positive for Pseudomonas and Klebsiella ESBL s/p meropenum x5day. Most recent Ucx neg.  - Blood cx (9/14) NGTD   - Afebrile in past 24hrs, WBC uptrending (8.6->12.1->17.7)   - continue to monitor VS, WBC     - keratitis: possibly secondary to HSV or CMV, but  can only say "a virus" and now s/p corneal transplant which is failing according to opt optho, continued L eye pain     -Continue with Valtrex for viral suppression    -Continue with Prednisolone eye drop into left eye    -Daily artificial tears    -f/u  optho recs    # DVT ppx: MICU Acceptance Note  67 y/o F with PMHx of aortic dissection (post-repair several years prior), spinal cord hematoma (now functionally paraplegic), chronic spasticity and pain (on Oxycodone and Baclofen pump), HTN, nephrolithiasis s/p left urethral stent, UTIs and endotheliitis/keratitis (post-corneal transplant), with recent hospitalizations at [facility] for UGIB with acute blood loss anemia requiring multiple PRBC transfusions. Patient was at home in early August 2019 when  thought she was not mentating well. He took her vitals at that time and found her to have a systolic BP in the 70s and HR in the 150s. When he brought her to [facility] they found her Hg to be 4.4. Unknown if she was having melena or hematochezia at that time. While she was there patient underwent four EGDs and received more than 10 units PRBC. While she was there patient underwent four EGDs and received more than 10 units PRBC. EGDs were unable to identify active source of bleeding and usually showed pooled blood. There is suspicion for dieulafoy, but unable to be acted upon at [place] with the multiple EGDs at times only showing adherent clot. Surgery consult recommended ex-lap given the severity, but patient's family deferred for now. Patient's PMD is Dr. Branham who recommended transfer to [facility] for evaluation by Dr. Ambrocio.     At [facility], EGD by Dr. Ambrocio on 9/5 which showed acute gastritis, gastric polyps with no source of GI bleed identified. Gastritis neg for H.Pylori. Enteroscopy 9/10 showed jejunum normal. small hiatal hernia. PillCam (VCE) study performed 9/11 showed active bleeding with large amount noted.  Bleeding site appeared to be in the distal aspect of the stomach. Emergent EGD was performed again on 9/13 after pt had bloody BM O/N but showedf no evidence of active bleeding.  On morning of 9/17, Pt was tachycardic w/ HR in the 140s and notes to be in pain despite receiving pain medication. NGT was placed and aspirated approx 2-3cc of bring red blood. H/H was 7/21.7. S/p 2u PRBC today. Pt was made NPO and underwent urgent EGD. Pt currently intubated after procedure and pending transfer to MICU.      Of note, pt's hospital course c/b fevers up to 102.7F, leukocytosis positive UA concerning for UTI. Ucx (9/9) was positive for Pseudomonas and Klebsiella ESBL. Bacteruria cleared after 5 day course of meropenum. 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 Baclofen pump due for refill 9/22.     MEDICATIONS  (STANDING):  artificial tears (preservative free) Ophthalmic Solution 1 Drop(s) Left EYE every 2 hours  ascorbic acid 500 milliGRAM(s) Oral daily  atorvastatin 40 milliGRAM(s) Oral at bedtime  baclofen 20 milliGRAM(s) Oral four times a day  DULoxetine 30 milliGRAM(s) Oral two times a day  gabapentin 800 milliGRAM(s) Oral three times a day  influenza   Vaccine 0.5 milliLiter(s) IntraMuscular once  lidocaine   Patch 1 Patch Transdermal daily  lidocaine   Patch 1 Patch Transdermal daily  meropenem  IVPB      meropenem  IVPB 1000 milliGRAM(s) IV Intermittent every 8 hours  multivitamin 1 Tablet(s) Oral daily  oxyCODONE    IR 5 milliGRAM(s) Oral two times a day  oxyCODONE    IR 10 milliGRAM(s) Oral at bedtime  pantoprazole    Tablet 40 milliGRAM(s) Oral two times a day  prednisoLONE acetate 1% Suspension 1 Drop(s) Left EYE four times a day  valACYclovir 1000 milliGRAM(s) Oral three times a day    MEDICATIONS  (PRN):  acetaminophen   Tablet .. 650 milliGRAM(s) Oral every 6 hours PRN Mild Pain (1 - 3), Moderate Pain (4 - 6)  ondansetron Injectable 4 milliGRAM(s) IV Push every 6 hours PRN Nausea and/or Vomiting  polyethylene glycol 3350 17 Gram(s) Oral every 12 hours PRN Constipation  zolpidem 5 milliGRAM(s) Oral at bedtime PRN Insomnia    CAPILLARY BLOOD GLUCOSE    I&O's Summary    16 Sep 2019 07:01  -  17 Sep 2019 07:00  --------------------------------------------------------  IN: 1420 mL / OUT: 1751 mL / NET: -331 mL      T(C): 36.4 (09-17-19 @ 07:30), Max: 36.7 (09-16-19 @ 21:00)  HR: 127 (09-17-19 @ 07:30) (89 - 145)  BP: 100/66 (09-17-19 @ 07:30) (77/51 - 158/82)  RR: 18 (09-17-19 @ 07:30) (17 - 19)  SpO2: 100% (09-17-19 @ 07:30) (95% - 100%)    PHYSICAL EXAM:   GENERAL: intubated and sedated, lying in hospital bed   HEENT:    Neck: supple   PULM: CTAB, no w/r/r  CV: +S1/S2, Tachycardic w/ regular rate, no murmurs appreciated  ABDOMEN: soft, nondistended, no tenderness to palpation, baclofen pump on RLQ   EXTREMITIES:  mild peripheral edema   PSYCH: unable to assess  NEUROLOGY: Pt intubated and sedated, motor/sensory unable to assess   SKIN: no new rashes or ulcers noted       LABS:                  7.0    17.7  )-----------( 439      ( 17 Sep 2019 05:10 )             21.7     WBC Trend: 17.7<--, 12.1<--, 8.62<--  09-17    139  |  104  |  13  ----------------------------<  152<H>  3.4<L>   |  26  |  0.65    Ca    8.4      17 Sep 2019 05:10    Creatinine Trend: 0.65<--, 0.39<--, 0.60<--, 0.60<--, 0.70<--, 0.53<--    RADIOLOGY & ADDITIONAL TESTS:    ASSESSMENT & PLAN:   67 y/o F with PMHx of aortic dissection (post-repair several years prior), spinal cord hematoma (now functionally paraplegic), chronic spasticity and pain (on Oxycodone and Baclofen pump), HTN, nephrolithiasis s/p left urethral stent, UTIs and endotheliitis/keratitis (post-corneal transplant), with recent hospitalizations at [facility] for UGIB. s/p multiple PRBC transfusions and EGDs. CTA a/p  negative and unable to localize source of bleed.  Patient transferred to MICU for further management post EDG 9/17.    # Neuro:   - currently intubated -> possible extubation later today  - sedated on propofol   - baseline: A&Ox3  - chronic pain ->   - Paraplegia 2/2 spinal hematoma    # CV:  - hx of aortic dissection -> post repair and appears stable on recent CTA performed at [place]  - HTN upon transfer w/ BP in 180/80s and tachy     # Resp:  - Currently intubated -> possible extubation later today  - Vent setting: PEEP 5 FIO2 100% TV .50 RR 12       # GI:  - EGD 9/17 -> no active bleeding site found  - Transfer to ICU w/ frequent H/H (CBC q4-q6), active type and screen   - keep NPO  - If sign of bleeding, urgent GI consult for possible CT angio, IR intervention or repeat EGD.    # Renal  - Creatinine 0.65, will keep monitor   - no active issues    # :  - Ucx (9/9) was positive for Pseudomonas and Klebsiella ESBL s/p meropenum x5day. Most recent Ucx neg.   - paraplegia: straight cath (patient has chronic urinary retention and is cathed by  at home) q4hr  - Miralax for chronic stool retention (does periodic manual disimpactions at home)    # ID:  - Ucx (9/9) was positive for Pseudomonas and Klebsiella ESBL s/p meropenum x5day. Most recent Ucx neg.  - Blood cx (9/14) NGTD   - Afebrile in past 24hrs, WBC uptrending (8.6->12.1->17.7)   - continue to monitor VS, WBC     - keratitis: possibly secondary to HSV or CMV, but  can only say "a virus" and now s/p corneal transplant which is failing according to opt optho, continued L eye pain     -Continue with Valtrex for viral suppression    -Continue with Prednisolone eye drop into left eye    -Daily artificial tears    -f/u  optho recs    # DVT ppx: MICU Acceptance Note  67 y/o F with PMHx of aortic dissection (post-repair several years prior), spinal cord hematoma (now functionally paraplegic), chronic spasticity and pain (on Oxycodone and Baclofen pump), HTN, nephrolithiasis s/p left urethral stent, UTIs and endotheliitis/keratitis (post-corneal transplant), with recent hospitalizations at [facility] for UGIB with acute blood loss anemia requiring multiple PRBC transfusions. Patient was at home in early August 2019 when  thought she was not mentating well. He took her vitals at that time and found her to have a systolic BP in the 70s and HR in the 150s. When he brought her to [facility] they found her Hg to be 4.4. Unknown if she was having melena or hematochezia at that time. While she was there patient underwent four EGDs and received more than 10 units PRBC. While she was there patient underwent four EGDs and received more than 10 units PRBC. EGDs were unable to identify active source of bleeding and usually showed pooled blood. There is suspicion for dieulafoy, but unable to be acted upon at [place] with the multiple EGDs at times only showing adherent clot. Surgery consult recommended ex-lap given the severity, but patient's family deferred for now. Patient's PMD is Dr. Branham who recommended transfer to [facility] for evaluation by Dr. Ambrocio.     At [facility], EGD by Dr. Ambrocio on 9/5 which showed acute gastritis, gastric polyps with no source of GI bleed identified. Gastritis neg for H.Pylori. Enteroscopy 9/10 showed jejunum normal. small hiatal hernia. PillCam (VCE) study performed 9/11 showed active bleeding with large amount noted.  Bleeding site appeared to be in the distal aspect of the stomach. Emergent EGD was performed again on 9/13 after pt had bloody BM O/N but showedf no evidence of active bleeding.  On morning of 9/17, Pt was tachycardic w/ HR in the 140s and notes to be in pain despite receiving pain medication. NGT was placed and aspirated approx 2-3cc of bring red blood. H/H was 7/21.7. S/p 2u PRBC today. Pt was made NPO and underwent urgent EGD. Pt currently intubated after procedure and pending transfer to MICU.      Of note, pt's hospital course c/b fevers up to 102.7F, leukocytosis positive UA concerning for UTI. Ucx (9/9) was positive for Pseudomonas and Klebsiella ESBL. Bacteruria cleared after 5 day course of meropenum. 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 Baclofen pump due for refill 9/22.     ROS unable to assess currently due to patient's mental status.     MEDICATIONS  (STANDING):  artificial tears (preservative free) Ophthalmic Solution 1 Drop(s) Left EYE every 2 hours  ascorbic acid 500 milliGRAM(s) Oral daily  atorvastatin 40 milliGRAM(s) Oral at bedtime  baclofen 20 milliGRAM(s) Oral four times a day  DULoxetine 30 milliGRAM(s) Oral two times a day  gabapentin 800 milliGRAM(s) Oral three times a day  influenza   Vaccine 0.5 milliLiter(s) IntraMuscular once  lidocaine   Patch 1 Patch Transdermal daily  lidocaine   Patch 1 Patch Transdermal daily  meropenem  IVPB      meropenem  IVPB 1000 milliGRAM(s) IV Intermittent every 8 hours  multivitamin 1 Tablet(s) Oral daily  oxyCODONE    IR 5 milliGRAM(s) Oral two times a day  oxyCODONE    IR 10 milliGRAM(s) Oral at bedtime  pantoprazole    Tablet 40 milliGRAM(s) Oral two times a day  prednisoLONE acetate 1% Suspension 1 Drop(s) Left EYE four times a day  valACYclovir 1000 milliGRAM(s) Oral three times a day    MEDICATIONS  (PRN):  acetaminophen   Tablet .. 650 milliGRAM(s) Oral every 6 hours PRN Mild Pain (1 - 3), Moderate Pain (4 - 6)  ondansetron Injectable 4 milliGRAM(s) IV Push every 6 hours PRN Nausea and/or Vomiting  polyethylene glycol 3350 17 Gram(s) Oral every 12 hours PRN Constipation  zolpidem 5 milliGRAM(s) Oral at bedtime PRN Insomnia    CAPILLARY BLOOD GLUCOSE    I&O's Summary    16 Sep 2019 07:01  -  17 Sep 2019 07:00  --------------------------------------------------------  IN: 1420 mL / OUT: 1751 mL / NET: -331 mL      T(C): 36.4 (09-17-19 @ 07:30), Max: 36.7 (09-16-19 @ 21:00)  HR: 127 (09-17-19 @ 07:30) (89 - 145)  BP: 100/66 (09-17-19 @ 07:30) (77/51 - 158/82)  RR: 18 (09-17-19 @ 07:30) (17 - 19)  SpO2: 100% (09-17-19 @ 07:30) (95% - 100%)    Mode: AC/ CMV (Assist Control/ Continuous Mandatory Ventilation)  RR (machine): 12  TV (machine): 400  FiO2: 100  PEEP: 5  ITime: 1  MAP: 9  PIP: 23    PHYSICAL EXAM:   GENERAL: intubated and sedated, lying in hospital bed   HEENT:  + L corneal transplant    Neck: supple   PULM: CTAB, no w/r/r  CV: +S1/S2, RRR, no murmurs appreciated  ABDOMEN: soft, nondistended, no tenderness to palpation, baclofen pump on RLQ   EXTREMITIES:  mild peripheral edema   PSYCH: unable to assess  NEUROLOGY: Pt intubated and sedated, motor/sensory unable to assess   SKIN: no new rashes or ulcers noted       LABS:                  7.0    17.7  )-----------( 439      ( 17 Sep 2019 05:10 )             21.7     WBC Trend: 17.7<--, 12.1<--, 8.62<--  09-17    139  |  104  |  13  ----------------------------<  152<H>  3.4<L>   |  26  |  0.65    Ca    8.4      17 Sep 2019 05:10    Creatinine Trend: 0.65<--, 0.39<--, 0.60<--, 0.60<--, 0.70<--, 0.53<--    RADIOLOGY & ADDITIONAL TESTS:    ASSESSMENT & PLAN:   67 y/o F with PMHx of aortic dissection (post-repair several years prior), spinal cord hematoma (now functionally paraplegic), chronic spasticity and pain (on Oxycodone and Baclofen pump), HTN, nephrolithiasis s/p left urethral stent, UTIs and endotheliitis/keratitis (post-corneal transplant), with recent hospitalizations at [facility] for UGIB. s/p multiple PRBC transfusions and EGDs. CTA a/p  negative and unable to localize source of bleed.  Patient transferred to MICU for further management post EDG 9/17.    # Neuro:   - currently intubated -> possible extubation later today  - sedated on propofol   - baseline: A&Ox3  - chronic pain ->   - Paraplegia 2/2 spinal hematoma     # CV:  - hx of aortic dissection -> post repair and appears stable on recent CTA performed at [place]  - HTN upon transfer w/ BP in 180/80s     # Resp:  - Currently intubated -> possible extubation later today  - Vent setting: PEEP 5 FIO2 100%  RR 12     # GI:  - EGD 9/17 -> no active bleeding site found  - Transfer to ICU w/ frequent H/H (CBC q4-q6), active type and screen   - keep NPO  - If sign of bleeding, urgent GI consult for possible CT angio, IR intervention or repeat EGD.    # Renal  - Creatinine 0.65, will keep monitor   - no active issues    # :  - Ucx (9/9) was positive for Pseudomonas and Klebsiella ESBL s/p meropenum x5day. Most recent Ucx neg.   - paraplegia: straight cath (patient has chronic urinary retention and is cathed by  at home) q4hr  - Miralax for chronic stool retention (does periodic manual disimpactions at home)    # ID:  - Ucx (9/9) was positive for Pseudomonas and Klebsiella ESBL s/p meropenum x5day. Most recent Ucx neg.  - Blood cx (9/14) NGTD   - Afebrile in past 24hrs, WBC uptrending (8.6->12.1->17.7)   - continue to monitor VS, WBC     - keratitis: possibly secondary to HSV or CMV, but  can only say "a virus" and now s/p corneal transplant which is failing according to opt optho, continued L eye pain     -Continue with Valtrex for viral suppression    -Continue with Prednisolone eye drop into left eye    -Daily artificial tears    -f/u  optho recs    # DVT ppx: MICU Acceptance Note  67 y/o F with PMHx of aortic dissection (post-repair several years prior), spinal cord hematoma (now functionally paraplegic), chronic spasticity and pain (on Oxycodone and Baclofen pump), HTN, nephrolithiasis s/p left urethral stent, UTIs and endotheliitis/keratitis (post-corneal transplant), with recent hospitalizations at [facility] for UGIB with acute blood loss anemia requiring multiple PRBC transfusions. Patient was at home in early August 2019 when  thought she was not mentating well. He took her vitals at that time and found her to have a systolic BP in the 70s and HR in the 150s. When he brought her to [facility] they found her Hg to be 4.4. Unknown if she was having melena or hematochezia at that time. While she was there patient underwent four EGDs and received more than 10 units PRBC. While she was there patient underwent four EGDs and received more than 10 units PRBC. EGDs were unable to identify active source of bleeding and usually showed pooled blood. There is suspicion for dieulafoy, but unable to be acted upon at [place] with the multiple EGDs at times only showing adherent clot. Surgery consult recommended ex-lap given the severity, but patient's family deferred for now. Patient's PMD is Dr. Branham who recommended transfer to [facility] for evaluation by Dr. Ambrocio.     At [email or web address]], EGD by Dr. Ambrocio on 9/5 which showed acute gastritis, gastric polyps with no source of GI bleed identified. Gastritis neg for H.Pylori. Enteroscopy 9/10 showed jejunum normal. small hiatal hernia. PillCam (VCE) study performed 9/11 showed active bleeding with large amount noted.  Bleeding site appeared to be in the distal aspect of the stomach. Emergent EGD was performed again on 9/13 after pt had bloody BM O/N but showedf no evidence of active bleeding.  On morning of 9/17, Pt was tachycardic w/ HR in the 140s and notes to be in pain despite receiving pain medication. NGT was placed and aspirated approx 2-3cc of bring red blood. H/H was 7/21.7. S/p 2u PRBC today. Pt was made NPO and underwent urgent EGD. Pt currently intubated after procedure and pending transfer to MICU.      Of note, pt's hospital course c/b fevers up to 102.7F, leukocytosis positive UA concerning for UTI. Ucx (9/9) was positive for Pseudomonas and Klebsiella ESBL. Bacteruria cleared after 5 day course of meropenum. 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 Baclofen pump due for refill 9/22.     ROS unable to assess currently due to patient's mental status.     MEDICATIONS  (STANDING):  artificial tears (preservative free) Ophthalmic Solution 1 Drop(s) Left EYE every 2 hours  ascorbic acid 500 milliGRAM(s) Oral daily  atorvastatin 40 milliGRAM(s) Oral at bedtime  baclofen 20 milliGRAM(s) Oral four times a day  DULoxetine 30 milliGRAM(s) Oral two times a day  gabapentin 800 milliGRAM(s) Oral three times a day  influenza   Vaccine 0.5 milliLiter(s) IntraMuscular once  lidocaine   Patch 1 Patch Transdermal daily  lidocaine   Patch 1 Patch Transdermal daily  meropenem  IVPB      meropenem  IVPB 1000 milliGRAM(s) IV Intermittent every 8 hours  multivitamin 1 Tablet(s) Oral daily  oxyCODONE    IR 5 milliGRAM(s) Oral two times a day  oxyCODONE    IR 10 milliGRAM(s) Oral at bedtime  pantoprazole    Tablet 40 milliGRAM(s) Oral two times a day  prednisoLONE acetate 1% Suspension 1 Drop(s) Left EYE four times a day  valACYclovir 1000 milliGRAM(s) Oral three times a day    MEDICATIONS  (PRN):  acetaminophen   Tablet .. 650 milliGRAM(s) Oral every 6 hours PRN Mild Pain (1 - 3), Moderate Pain (4 - 6)  ondansetron Injectable 4 milliGRAM(s) IV Push every 6 hours PRN Nausea and/or Vomiting  polyethylene glycol 3350 17 Gram(s) Oral every 12 hours PRN Constipation  zolpidem 5 milliGRAM(s) Oral at bedtime PRN Insomnia    CAPILLARY BLOOD GLUCOSE    I&O's Summary    16 Sep 2019 07:01  -  17 Sep 2019 07:00  --------------------------------------------------------  IN: 1420 mL / OUT: 1751 mL / NET: -331 mL      T(C): 36.4 (09-17-19 @ 07:30), Max: 36.7 (09-16-19 @ 21:00)  HR: 127 (09-17-19 @ 07:30) (89 - 145)  BP: 100/66 (09-17-19 @ 07:30) (77/51 - 158/82)  RR: 18 (09-17-19 @ 07:30) (17 - 19)  SpO2: 100% (09-17-19 @ 07:30) (95% - 100%)    Mode: AC/ CMV (Assist Control/ Continuous Mandatory Ventilation)  RR (machine): 12  TV (machine): 400  FiO2: 100  PEEP: 5  ITime: 1  MAP: 9  PIP: 23    PHYSICAL EXAM:   GENERAL: intubated and sedated, lying in hospital bed   HEENT:  + L corneal transplant, unable to assess pupillary reaction in both eyes  Neck: supple   PULM: CTAB, no w/r/r  CV: +S1/S2, RRR, no murmurs appreciated  ABDOMEN: soft, nondistended, no tenderness to palpation, baclofen pump on RLQ   EXTREMITIES:  mild peripheral edema   PSYCH: unable to assess  NEUROLOGY: Pt intubated and sedated, motor/sensory unable to assess   SKIN: no new rashes or ulcers noted       LABS:                  7.0    17.7  )-----------( 439      ( 17 Sep 2019 05:10 )             21.7     WBC Trend: 17.7<--, 12.1<--, 8.62<--  09-17    139  |  104  |  13  ----------------------------<  152<H>  3.4<L>   |  26  |  0.65    Ca    8.4      17 Sep 2019 05:10    Creatinine Trend: 0.65<--, 0.39<--, 0.60<--, 0.60<--, 0.70<--, 0.53<--    RADIOLOGY & ADDITIONAL TESTS:    ASSESSMENT & PLAN:   67 y/o F with PMHx of aortic dissection (post-repair several years prior), spinal cord hematoma (now functionally paraplegic), chronic spasticity and pain (on Oxycodone and Baclofen pump), HTN, nephrolithiasis s/p left urethral stent, UTIs and endotheliitis/keratitis (post-corneal transplant), with recent hospitalizations at [facility] for UGIB. s/p multiple PRBC transfusions and EGDs. CTA a/p  negative and unable to localize source of bleed.  Patient transferred to MICU for further management post EDG 9/17.    # Neuro:   - currently intubated -> possible extubation later today  - sedated on propofol   - baseline: A&Ox3  - chronic pain ->   - Paraplegia 2/2 spinal hematoma     # CV:  - hx of aortic dissection -> post repair and appears stable on recent CTA performed at [place]  - HTN upon transfer w/ BP in 180/80s     # Resp:  - Currently intubated -> possible extubation later today  - Vent setting: PEEP 5 FIO2 100%  RR 12     # GI:  - EGD 9/17 -> no active bleeding site found  - Transfer to ICU w/ frequent H/H (CBC q4-q6), active type and screen   - keep NPO  - If sign of bleeding, urgent GI consult for possible CT angio, IR intervention or repeat EGD.    # Renal  - Creatinine 0.65, will keep monitor   - no active issues    # :  - Ucx (9/9) was positive for Pseudomonas and Klebsiella ESBL s/p meropenum x5day. Most recent Ucx neg.   - paraplegia: straight cath (patient has chronic urinary retention and is cathed by  at home) q4hr  - Miralax for chronic stool retention (does periodic manual disimpactions at home)    # ID:  - Ucx (9/9) was positive for Pseudomonas and Klebsiella ESBL s/p meropenum x5day. Most recent Ucx neg.  - Blood cx (9/14) NGTD   - Afebrile in past 24hrs, WBC uptrending (8.6->12.1->17.7)   - continue to monitor VS, WBC     - keratitis: possibly secondary to HSV or CMV, but  can only say "a virus" and now s/p corneal transplant which is failing according to opt optho, continued L eye pain     -Continue with Valtrex for viral suppression    -Continue with Prednisolone eye drop into left eye    -Daily artificial tears    -f/u  optho recs    # DVT ppx: MICU Acceptance Note  67 y/o F with PMHx of aortic dissection (post-repair several years prior), spinal cord hematoma (now functionally paraplegic), chronic spasticity and pain (on Oxycodone and Baclofen pump), HTN, nephrolithiasis s/p left urethral stent, UTIs and endotheliitis/keratitis (post-corneal transplant), with recent hospitalizations at [facility] for UGIB with acute blood loss anemia requiring multiple PRBC transfusions. Patient was at home in early August 2019 when  thought she was not mentating well. He took her vitals at that time and found her to have a systolic BP in the 70s and HR in the 150s. When he brought her to [facility] they found her Hg to be 4.4. Unknown if she was having melena or hematochezia at that time. While she was there patient underwent four EGDs and received more than 10 units PRBC. While she was there patient underwent four EGDs and received more than 10 units PRBC. EGDs were unable to identify active source of bleeding and usually showed pooled blood. There is suspicion for dieulafoy, but unable to be acted upon at [place] with the multiple EGDs at times only showing adherent clot. Surgery consult recommended ex-lap given the severity, but patient's family deferred for now. Patient's PMD is Dr. Branham who recommended transfer to [facility] for evaluation by Dr. Ambrocio.     At [facility], EGD by Dr. Ambrocio on 9/5 which showed acute gastritis, gastric polyps with no source of GI bleed identified. Gastritis neg for H.Pylori. Enteroscopy 9/10 showed jejunum normal. small hiatal hernia. PillCam (VCE) study performed 9/11 showed active bleeding with large amount noted.  Bleeding site appeared to be in the distal aspect of the stomach. Emergent EGD was performed again on 9/13 after pt had bloody BM O/N but showedf no evidence of active bleeding.  On morning of 9/17, Pt was tachycardic w/ HR in the 140s and notes to be in pain despite receiving pain medication. NGT was placed and aspirated approx 2-3cc of bring red blood. H/H was 7/21.7. S/p 2u PRBC today. Pt was made NPO and underwent urgent EGD. Pt currently intubated after procedure and pending transfer to MICU.      Of note, pt's hospital course c/b fevers up to 102.7F, leukocytosis positive UA concerning for UTI. Ucx (9/9) was positive for Pseudomonas and Klebsiella ESBL. Bacteruria cleared after 5 day course of meropenum. 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 Baclofen pump due for refill 9/22.     ROS unable to assess currently due to patient's mental status.     MEDICATIONS  (STANDING):  artificial tears (preservative free) Ophthalmic Solution 1 Drop(s) Left EYE every 2 hours  ascorbic acid 500 milliGRAM(s) Oral daily  atorvastatin 40 milliGRAM(s) Oral at bedtime  baclofen 20 milliGRAM(s) Oral four times a day  DULoxetine 30 milliGRAM(s) Oral two times a day  gabapentin 800 milliGRAM(s) Oral three times a day  influenza   Vaccine 0.5 milliLiter(s) IntraMuscular once  lidocaine   Patch 1 Patch Transdermal daily  lidocaine   Patch 1 Patch Transdermal daily  meropenem  IVPB      meropenem  IVPB 1000 milliGRAM(s) IV Intermittent every 8 hours  multivitamin 1 Tablet(s) Oral daily  oxyCODONE    IR 5 milliGRAM(s) Oral two times a day  oxyCODONE    IR 10 milliGRAM(s) Oral at bedtime  pantoprazole    Tablet 40 milliGRAM(s) Oral two times a day  prednisoLONE acetate 1% Suspension 1 Drop(s) Left EYE four times a day  valACYclovir 1000 milliGRAM(s) Oral three times a day    MEDICATIONS  (PRN):  acetaminophen   Tablet .. 650 milliGRAM(s) Oral every 6 hours PRN Mild Pain (1 - 3), Moderate Pain (4 - 6)  ondansetron Injectable 4 milliGRAM(s) IV Push every 6 hours PRN Nausea and/or Vomiting  polyethylene glycol 3350 17 Gram(s) Oral every 12 hours PRN Constipation  zolpidem 5 milliGRAM(s) Oral at bedtime PRN Insomnia    CAPILLARY BLOOD GLUCOSE    I&O's Summary    16 Sep 2019 07:01  -  17 Sep 2019 07:00  --------------------------------------------------------  IN: 1420 mL / OUT: 1751 mL / NET: -331 mL      T(C): 36.4 (09-17-19 @ 07:30), Max: 36.7 (09-16-19 @ 21:00)  HR: 127 (09-17-19 @ 07:30) (89 - 145)  BP: 100/66 (09-17-19 @ 07:30) (77/51 - 158/82)  RR: 18 (09-17-19 @ 07:30) (17 - 19)  SpO2: 100% (09-17-19 @ 07:30) (95% - 100%)    Mode: AC/ CMV (Assist Control/ Continuous Mandatory Ventilation)  RR (machine): 12  TV (machine): 400  FiO2: 100  PEEP: 5  ITime: 1  MAP: 9  PIP: 23    PHYSICAL EXAM:   GENERAL: intubated and sedated, lying in hospital bed   HEENT:  + L corneal transplant, unable to assess pupillary reaction in both eyes  Neck: supple   PULM: CTAB, no crackles, no wheezing   CV: +S1/S2, RRR, no murmurs appreciated   ABDOMEN: soft, nondistended, no tenderness to palpation, baclofen pump on RLQ   EXTREMITIES: mild peripheral edema   PSYCH: unable to assess  NEUROLOGY: Pt intubated and sedated, motor/sensory unable to assess   SKIN: no new rashes or ulcers noted       LABS:                  7.0    17.7  )-----------( 439      ( 17 Sep 2019 05:10 )             21.7     WBC Trend: 17.7<--, 12.1<--, 8.62<--  09-17    139  |  104  |  13  ----------------------------<  152<H>  3.4<L>   |  26  |  0.65    Ca    8.4      17 Sep 2019 05:10    Creatinine Trend: 0.65<--, 0.39<--, 0.60<--, 0.60<--, 0.70<--, 0.53<--    RADIOLOGY & ADDITIONAL TESTS:    ASSESSMENT & PLAN:   67 y/o F with PMHx of aortic dissection (post-repair several years prior), spinal cord hematoma (now functionally paraplegic), chronic spasticity and pain (on Oxycodone and Baclofen pump), HTN, nephrolithiasis s/p left urethral stent, UTIs and endotheliitis/keratitis (post-corneal transplant), with recent hospitalizations at [facility] for UGIB. s/p multiple PRBC transfusions and EGDs. CTA a/p  negative and unable to localize source of bleed.  Patient transferred to MICU for further management post EDG 9/17.    # Neuro:   - currently intubated -> possible extubation later today  - sedated on propofol   - baseline: A&Ox3  - chronic pain ->   - Paraplegia 2/2 spinal hematoma     # CV:  - hx of aortic dissection -> post repair and appears stable on recent CTA performed at [place]  - HTN upon transfer w/ BP in 180/80s     # Resp:  - Currently intubated -> possible extubation later today  - Vent setting: PEEP 5 FIO2 100%  RR 12     # GI:  - EGD 9/17 -> no active bleeding site found  - Transfer to ICU w/ frequent H/H (CBC q4-q6), active type and screen   - keep NPO  - If sign of bleeding, urgent GI consult for possible CT angio, IR intervention or repeat EGD.    # Renal  - Creatinine 0.65, will keep monitor   - no active issues    # :  - Ucx (9/9) was positive for Pseudomonas and Klebsiella ESBL s/p meropenum x5day. Most recent Ucx neg.   - paraplegia: straight cath (patient has chronic urinary retention and is cathed by  at home) q4hr  - Miralax for chronic stool retention (does periodic manual disimpactions at home)    # ID:  - Ucx (9/9) was positive for Pseudomonas and Klebsiella ESBL s/p meropenum x5day. Most recent Ucx neg.  - Blood cx (9/14) NGTD   - Afebrile in past 24hrs, WBC uptrending (8.6->12.1->17.7)   - continue to monitor VS, WBC     - keratitis: possibly secondary to HSV or CMV, but  can only say "a virus" and now s/p corneal transplant which is failing according to opt optho, continued L eye pain     -Continue with Valtrex for viral suppression    -Continue with Prednisolone eye drop into left eye    -Daily artificial tears    -f/u  optho recs    # DVT ppx: MICU Acceptance Note  67 y/o F with PMHx of aortic dissection (post-repair several years prior), spinal cord hematoma (now functionally paraplegic), chronic spasticity and pain (on Oxycodone and Baclofen pump), HTN, nephrolithiasis s/p left urethral stent, UTIs and endotheliitis/keratitis (post-corneal transplant), with recent hospitalizations at [facility] for UGIB with acute blood loss anemia requiring multiple PRBC transfusions. Patient was at home in early August 2019 when  thought she was not mentating well. He took her vitals at that time and found her to have a systolic BP in the 70s and HR in the 150s. When he brought her to [facility] they found her Hg to be 4.4. Unknown if she was having melena or hematochezia at that time. While she was there patient underwent four EGDs and received more than 10 units PRBC. While she was there patient underwent four EGDs and received more than 10 units PRBC. EGDs were unable to identify active source of bleeding and usually showed pooled blood. There is suspicion for dieulafoy, but unable to be acted upon at [facility] with the multiple EGDs at times only showing adherent clot. Surgery consult recommended ex-lap given the severity, but patient's family deferred for now. Patient's PMD is Dr. Branham who recommended transfer to [facility] for evaluation by Dr. Ambrocio.     At [facility], EGD by Dr. Ambrocio 9/5 showed acute gastritis, gastric polyps with no source of GI bleed identified. Gastritis neg for H.Pylori. Enteroscopy 9/10 showed jejunum normal. small hiatal hernia. PillCam (VCE) study performed 9/11 showed active bleeding with large amount of blood noted. Bleeding site appeared to be in the distal aspect of the stomach. Emergent EGD was performed again on 9/13 after pt had bloody BM O/N but showed no evidence of active bleeding.  On morning of 9/17, Pt was tachycardic w/ HR in the 140s and notes to be in pain despite receiving pain medication. NGT was placed and aspirated approx 2-3cc of bring red blood. H/H was 7/21.7. S/p 2u PRBC today. Pt made NPO and underwent urgent EGD again on 9/17, which failed to show active bleeding sites. Pt intubated for EDG and pending transfer to MICU.      Of note, pt's hospital course c/b fevers up to 102.7F, leukocytosis and positive UA concerning for UTI. Ucx (9/9) was positive for Pseudomonas and Klebsiella ESBL. Bacteruria cleared after 5 day course of meropenum. 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 Baclofen pump due for refill 9/22.     ROS unable to assess currently due to patient's mental status.     MEDICATIONS  (STANDING):  artificial tears (preservative free) Ophthalmic Solution 1 Drop(s) Left EYE every 2 hours  ascorbic acid 500 milliGRAM(s) Oral daily  atorvastatin 40 milliGRAM(s) Oral at bedtime  baclofen 20 milliGRAM(s) Oral four times a day  DULoxetine 30 milliGRAM(s) Oral two times a day  gabapentin 800 milliGRAM(s) Oral three times a day  influenza   Vaccine 0.5 milliLiter(s) IntraMuscular once  lidocaine   Patch 1 Patch Transdermal daily  lidocaine   Patch 1 Patch Transdermal daily  meropenem  IVPB      meropenem  IVPB 1000 milliGRAM(s) IV Intermittent every 8 hours  multivitamin 1 Tablet(s) Oral daily  oxyCODONE    IR 5 milliGRAM(s) Oral two times a day  oxyCODONE    IR 10 milliGRAM(s) Oral at bedtime  pantoprazole    Tablet 40 milliGRAM(s) Oral two times a day  prednisoLONE acetate 1% Suspension 1 Drop(s) Left EYE four times a day  valACYclovir 1000 milliGRAM(s) Oral three times a day    MEDICATIONS  (PRN):  acetaminophen   Tablet .. 650 milliGRAM(s) Oral every 6 hours PRN Mild Pain (1 - 3), Moderate Pain (4 - 6)  ondansetron Injectable 4 milliGRAM(s) IV Push every 6 hours PRN Nausea and/or Vomiting  polyethylene glycol 3350 17 Gram(s) Oral every 12 hours PRN Constipation  zolpidem 5 milliGRAM(s) Oral at bedtime PRN Insomnia    CAPILLARY BLOOD GLUCOSE    I&O's Summary    16 Sep 2019 07:01  -  17 Sep 2019 07:00  --------------------------------------------------------  IN: 1420 mL / OUT: 1751 mL / NET: -331 mL      T(C): 36.4 (09-17-19 @ 07:30), Max: 36.7 (09-16-19 @ 21:00)  HR: 127 (09-17-19 @ 07:30) (89 - 145)  BP: 100/66 (09-17-19 @ 07:30) (77/51 - 158/82)  RR: 18 (09-17-19 @ 07:30) (17 - 19)  SpO2: 100% (09-17-19 @ 07:30) (95% - 100%)    Mode: AC/ CMV (Assist Control/ Continuous Mandatory Ventilation)  RR (machine): 12  TV (machine): 400  FiO2: 100  PEEP: 5  ITime: 1  MAP: 9  PIP: 23    PHYSICAL EXAM:   GENERAL: intubated and sedated, lying in hospital bed   HEENT:  + L corneal transplant, unable to assess pupillary reaction in both eyes  Neck: supple   PULM: CTAB, no crackles, no wheezing   CV: +S1/S2, RRR, no murmurs appreciated   ABDOMEN: soft, nondistended, no tenderness to palpation, baclofen pump on RLQ   EXTREMITIES: mild peripheral edema   PSYCH: unable to assess  NEUROLOGY: Pt intubated and sedated, motor/sensory unable to assess   SKIN: no new rashes or ulcers noted       LABS:                  7.0    17.7  )-----------( 439      ( 17 Sep 2019 05:10 )             21.7     WBC Trend: 17.7<--, 12.1<--, 8.62<--  09-17    139  |  104  |  13  ----------------------------<  152<H>  3.4<L>   |  26  |  0.65    Ca    8.4      17 Sep 2019 05:10    Creatinine Trend: 0.65<--, 0.39<--, 0.60<--, 0.60<--, 0.70<--, 0.53<--    RADIOLOGY & ADDITIONAL TESTS:    ASSESSMENT & PLAN:   67 y/o F with PMHx of aortic dissection (post-repair several years prior), spinal cord hematoma (now functionally paraplegic), chronic spasticity and pain (on Oxycodone and Baclofen pump), HTN, nephrolithiasis s/p left urethral stent, UTIs and endotheliitis/keratitis (post-corneal transplant), with recent hospitalizations at [facility] for UGIB. s/p multiple PRBC transfusions and EGDs. CTA a/p  negative and unable to localize source of bleed.  Patient transferred to MICU for further management post EDG 9/17.    # Neuro:   - currently intubated -> possible extubation later today  - sedated on propofol   - baseline: A&Ox3  - chronic pain ->   - Paraplegia 2/2 spinal hematoma     # CV:  - hx of aortic dissection -> post repair and appears stable on recent CTA performed at [facility]  - HTN upon transfer w/ BP in 180/80s     # Resp:  - Currently intubated -> possible extubation later today  - Vent setting: PEEP 5 FIO2 100%  RR 12     # GI:  - EGD 9/17 -> no active bleeding site found  - Transfer to ICU w/ frequent H/H (CBC q4-q6), active type and screen   - keep NPO  - If sign of bleeding, urgent GI consult for possible CT angio, IR intervention or repeat EGD.    # Renal  - Creatinine 0.65, will keep monitor   - no active issues    # :  - Ucx (9/9) was positive for Pseudomonas and Klebsiella ESBL s/p meropenum x5day. Most recent Ucx neg.   - paraplegia: straight cath (patient has chronic urinary retention and is cathed by  at home) q4hr  - Miralax for chronic stool retention (does periodic manual disimpactions at home)    # ID:  - Ucx (9/9) was positive for Pseudomonas and Klebsiella ESBL s/p meropenum x5day. Most recent Ucx neg.  - Blood cx (9/14) NGTD   - Afebrile in past 24hrs, WBC uptrending (8.6->12.1->17.7)   - continue to monitor VS, WBC     - keratitis: possibly secondary to HSV or CMV, but  can only say "a virus" and now s/p corneal transplant which is failing according to opt optho, continued L eye pain     -Continue with Valtrex for viral suppression    -Continue with Prednisolone eye drop into left eye    -Daily artificial tears    -f/u  optho recs    # DVT ppx: MICU Acceptance Note  67 y/o F with PMHx of aortic dissection (post-repair several years prior), spinal cord hematoma (now functionally paraplegic), chronic spasticity and pain (on Oxycodone and Baclofen pump), HTN, nephrolithiasis s/p left urethral stent, UTIs and endotheliitis/keratitis (post-corneal transplant), with recent hospitalizations at [facility] for UGIB with acute blood loss anemia requiring multiple PRBC transfusions. Patient was at home in early August 2019 when  thought she was not mentating well. He took her vitals at that time and found her to have a systolic BP in the 70s and HR in the 150s. When he brought her to [facility] they found her Hg to be 4.4. Unknown if she was having melena or hematochezia at that time. While she was there patient underwent four EGDs and received more than 10 units PRBC. While she was there patient underwent four EGDs and received more than 10 units PRBC. EGDs were unable to identify active source of bleeding and usually showed pooled blood. There is suspicion for dieulafoy, but unable to be acted upon at [facility] with the multiple EGDs at times only showing adherent clot. Surgery consult recommended ex-lap given the severity, but patient's family deferred for now. Patient's PMD is Dr. Branham who recommended transfer to [facility] for evaluation by Dr. Ambrocio.     At [facility], EGD by Dr. Ambrocio 9/5 showed acute gastritis, gastric polyps with no source of GI bleed identified. Gastritis neg for H.Pylori. Enteroscopy 9/10 showed jejunum normal. small hiatal hernia. PillCam (VCE) study performed 9/11 showed active bleeding with large amount of blood noted. Bleeding site appeared to be in the distal aspect of the stomach. Emergent EGD was performed again on 9/13 after pt had bloody BM O/N but showed no evidence of active bleeding.  On morning of 9/17, Pt was tachycardic w/ HR in the 140s and notes to be in pain despite receiving pain medication. NGT was placed and aspirated approx 2-3cc of bring red blood. H/H was 7/21.7. S/p 2u PRBC today. Pt made NPO and underwent urgent EGD again on 9/17, which failed to show active bleeding sites. Pt intubated for EDG and pending transfer to MICU.      Of note, pt's hospital course c/b fevers up to 102.7F, leukocytosis and positive UA concerning for UTI. Ucx (9/9) was positive for Pseudomonas and Klebsiella ESBL. Bacteruria cleared after 5 day course of meropenum. 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 Baclofen pump due for refill 9/22.     ROS unable to assess currently due to patient's mental status.     MEDICATIONS  (STANDING):  artificial tears (preservative free) Ophthalmic Solution 1 Drop(s) Left EYE every 2 hours  ascorbic acid 500 milliGRAM(s) Oral daily  atorvastatin 40 milliGRAM(s) Oral at bedtime  baclofen 20 milliGRAM(s) Oral four times a day  DULoxetine 30 milliGRAM(s) Oral two times a day  gabapentin 800 milliGRAM(s) Oral three times a day  influenza   Vaccine 0.5 milliLiter(s) IntraMuscular once  lidocaine   Patch 1 Patch Transdermal daily  lidocaine   Patch 1 Patch Transdermal daily  meropenem  IVPB      meropenem  IVPB 1000 milliGRAM(s) IV Intermittent every 8 hours  multivitamin 1 Tablet(s) Oral daily  oxyCODONE    IR 5 milliGRAM(s) Oral two times a day  oxyCODONE    IR 10 milliGRAM(s) Oral at bedtime  pantoprazole    Tablet 40 milliGRAM(s) Oral two times a day  prednisoLONE acetate 1% Suspension 1 Drop(s) Left EYE four times a day  valACYclovir 1000 milliGRAM(s) Oral three times a day    MEDICATIONS  (PRN):  acetaminophen   Tablet .. 650 milliGRAM(s) Oral every 6 hours PRN Mild Pain (1 - 3), Moderate Pain (4 - 6)  ondansetron Injectable 4 milliGRAM(s) IV Push every 6 hours PRN Nausea and/or Vomiting  polyethylene glycol 3350 17 Gram(s) Oral every 12 hours PRN Constipation  zolpidem 5 milliGRAM(s) Oral at bedtime PRN Insomnia    CAPILLARY BLOOD GLUCOSE    I&O's Summary    16 Sep 2019 07:01  -  17 Sep 2019 07:00  --------------------------------------------------------  IN: 1420 mL / OUT: 1751 mL / NET: -331 mL      T(C): 36.4 (09-17-19 @ 07:30), Max: 36.7 (09-16-19 @ 21:00)  HR: 127 (09-17-19 @ 07:30) (89 - 145)  BP: 100/66 (09-17-19 @ 07:30) (77/51 - 158/82)  RR: 18 (09-17-19 @ 07:30) (17 - 19)  SpO2: 100% (09-17-19 @ 07:30) (95% - 100%)    Mode: AC/ CMV (Assist Control/ Continuous Mandatory Ventilation)  RR (machine): 12  TV (machine): 400  FiO2: 100  PEEP: 5  ITime: 1  MAP: 9  PIP: 23    PHYSICAL EXAM:   GENERAL: intubated and sedated, lying in hospital bed   HEENT:  + L corneal transplant, unable to assess pupillary reaction in both eyes  Neck: supple   PULM: CTAB, no crackles, no wheezing   CV: +S1/S2, RRR, no murmurs appreciated   ABDOMEN: soft, nondistended, no tenderness to palpation, baclofen pump on RLQ   EXTREMITIES: mild peripheral edema   PSYCH: unable to assess  NEUROLOGY: Pt intubated and sedated, motor/sensory unable to assess   SKIN: no new rashes or ulcers noted       LABS:                  7.0    17.7  )-----------( 439      ( 17 Sep 2019 05:10 )             21.7     WBC Trend: 17.7<--, 12.1<--, 8.62<--  09-17    139  |  104  |  13  ----------------------------<  152<H>  3.4<L>   |  26  |  0.65    Ca    8.4      17 Sep 2019 05:10    Creatinine Trend: 0.65<--, 0.39<--, 0.60<--, 0.60<--, 0.70<--, 0.53<--    RADIOLOGY & ADDITIONAL TESTS:    ASSESSMENT & PLAN:   67 y/o F with PMHx of aortic dissection (post-repair several years prior), spinal cord hematoma (now functionally paraplegic), chronic spasticity and pain (on Oxycodone and Baclofen pump), HTN, nephrolithiasis s/p left urethral stent, UTIs and endotheliitis/keratitis (post-corneal transplant), with recent hospitalizations at [facility] for UGIB. s/p multiple PRBC transfusions and EGDs. CTA a/p  negative and unable to localize source of bleed.  Patient transferred to MICU for further management post EDG 9/17.    # Neuro:   - currently intubated -> possible extubation later today  - sedated on propofol, wean as tolerated  - baseline: A&Ox3  - chronic pain -> switch to medication through NG tube.  - Paraplegia 2/2 spinal hematoma     # CV:  - hx of aortic dissection -> post repair and appears stable on recent CTA performed at [facility]  - HTN upon transfer w/ BP in 180/80s     # Resp:  - Currently intubated -> possible extubation later today  - Vent setting: PEEP 5 FIO2 100%  RR 12     # GI:  - EGD 9/17 -> no active bleeding site found  - Transfer to ICU w/ frequent H/H (CBC q6), active type and screen   - keep NPO  - If sign of bleeding (Hb drop, hypotension + tachycardia), urgent GI consult for possible CT angio, IR intervention or repeat EGD.  - Patient has NG tube now, will check for placement of NG tube, and check if there is any bleeding when lavaged.     # Renal  - Creatinine 0.65, will keep monitor   - no active issues    # :  - Ucx (9/9) was positive for Pseudomonas and Klebsiella ESBL s/p meropenum x5day. Most recent Ucx neg.   - paraplegia: straight cath (patient has chronic urinary retention and is cathed by  at home) q4hr  - Miralax for chronic stool retention (does periodic manual disimpactions at home)    # ID:  - Ucx (9/9) was positive for Pseudomonas and Klebsiella ESBL s/p meropenum x5day. Most recent Ucx neg.  - Blood cx (9/14) NGTD   - Afebrile in past 24hrs, WBC uptrending (8.6->12.1->17.7)   - continue to monitor VS, WBC     - keratitis: possibly secondary to HSV or CMV, but  can only say "a virus" and now s/p corneal transplant which is failing according to opt optho, continued L eye pain     -Continue with Valtrex for viral suppression    -Continue with Prednisolone eye drop into left eye    -Daily artificial tears    -f/u  optho recs    # DVT ppx:  -SCDs

## 2019-09-17 NOTE — CHART NOTE - NSCHARTNOTEFT_GEN_A_CORE
CC: Tachycardia    Event Summary:  Notified by RN for HR 140s this AM. Patient seen and examined at bedside, endorsing significant back pain this morning. Denies CP, palpitations, SOB, dizziness, syncope. No recent episodes of melena/hematochezia, no active bleeding.  Upon chart review, patient noted to have previous episodes of tachycardia this admission due to pain and anemia.    Vital Signs Last 24 Hrs  T(C): 36.4 (17 Sep 2019 04:32), Max: 36.7 (16 Sep 2019 21:00)  T(F): 97.5 (17 Sep 2019 04:32), Max: 98.1 (16 Sep 2019 21:00)  HR: 105 (17 Sep 2019 04:32) (83 - 105)  BP: 95/63 (17 Sep 2019 04:32) (95/63 - 158/82)  BP(mean): --  RR: 18 (17 Sep 2019 04:32) (18 - 19)  SpO2: 95% (17 Sep 2019 04:32) (94% - 100%)    Physical Exam  General: awake and alert, though withdrawn in pain  Card: regular, tachycardic  Pulm: CTA b/l, respirations even and non-labored  Abd: soft, nontender, nondistended  Ext: no LE edema      A/P:  67 y/o F with PMHx of aortic dissection (post-repair several years prior), spinal cord hematoma (now functionally paraplegic), chronic spasticity and pain (on Oxycodone and Baclofen pump), HTN, nephrolithiasis s/p left urethral stent, UTIs and endotheliitis/keratitis (post-corneal transplant), with recent hospitalizations at [facility] for UGIB with acute blood loss anemia requiring multiple PRBC transfusions. Work up there included CTA a/p which was negative and unable to localize source of bleed, s/p multiple EGD which showed pooled blood and/or adherent clot in gastric fundus that could not be removed. Patient transferred here for further management and evaluation by Dr. Ambrocio.     # Sinus tachycardia, likely 2/2 severe back pain  - VS hemodynamically stable, afebrile.  - Stat EKG shows sinus tachycardia, . No acute SARI/TWI from prior.   - Check AM CBC.  - Pain management (oxycodone, baclofen, gabapentin, lidoderm, tylenol)  - Continue to monitor closely.   Will endorse to primary team in AM.      Suyapa Krishnamurthy PA-C  Department of Medicine  #[id number] CC: Tachycardia    Event Summary:  Notified by RN for HR 140s this AM. Patient seen and examined at bedside, endorsing significant back pain this morning. Denies CP, palpitations, SOB, dizziness, syncope. No recent episodes of melena/hematochezia, no active bleeding. Last BM yesterday was light brown.  Upon chart review, patient noted to have previous episodes of tachycardia this admission due to pain and anemia.    Vital Signs Last 24 Hrs  T(C): 36.4 (17 Sep 2019 04:32), Max: 36.7 (16 Sep 2019 21:00)  T(F): 97.5 (17 Sep 2019 04:32), Max: 98.1 (16 Sep 2019 21:00)  HR: 105 (17 Sep 2019 04:32) (83 - 105)  BP: 95/63 (17 Sep 2019 04:32) (95/63 - 158/82)  BP(mean): --  RR: 18 (17 Sep 2019 04:32) (18 - 19)  SpO2: 95% (17 Sep 2019 04:32) (94% - 100%)    Physical Exam  General: awake and alert, though withdrawn in pain  Card: regular, tachycardic  Pulm: CTA b/l, respirations even and non-labored  Abd: soft, nontender, nondistended  Ext: no LE edema      A/P:  67 y/o F with PMHx of aortic dissection (post-repair several years prior), spinal cord hematoma (now functionally paraplegic), chronic spasticity and pain (on Oxycodone and Baclofen pump), HTN, nephrolithiasis s/p left urethral stent, UTIs and endotheliitis/keratitis (post-corneal transplant), with recent hospitalizations at [facility] for UGIB with acute blood loss anemia requiring multiple PRBC transfusions. Work up there included CTA a/p which was negative and unable to localize source of bleed, s/p multiple EGD which showed pooled blood and/or adherent clot in gastric fundus that could not be removed. Patient transferred here for further management and evaluation by Dr. Ambrocio.     # Sinus tachycardia, likely 2/2 severe back pain vs anemia  - VS hemodynamically stable, afebrile.  - Stat EKG shows sinus tachycardia, . No acute SARI/TWI from prior.   - Check AM CBC.  - Pain management (oxycodone, baclofen, gabapentin, lidoderm, tylenol)  - Continue to monitor closely.   Will endorse to primary team in AM.      Suyapa Krishnamurthy PA-C  Department of Medicine  #[id number]      ADDENDUM:  AM H/H resulted 7.0/21.7, CC: Tachycardia    Event Summary:  Notified by RN for HR 140s this AM. Patient seen and examined at bedside, endorsing significant back pain this morning. Denies CP, palpitations, SOB, dizziness, syncope. No recent episodes of melena/hematochezia, no active bleeding. Last BM yesterday was light brown.  Upon chart review, patient noted to have previous episodes of tachycardia this admission due to pain and anemia.    Vital Signs Last 24 Hrs  T(C): 36.4 (17 Sep 2019 04:32), Max: 36.7 (16 Sep 2019 21:00)  T(F): 97.5 (17 Sep 2019 04:32), Max: 98.1 (16 Sep 2019 21:00)  HR: 105 (17 Sep 2019 04:32) (83 - 105)  BP: 95/63 (17 Sep 2019 04:32) (95/63 - 158/82)  BP(mean): --  RR: 18 (17 Sep 2019 04:32) (18 - 19)  SpO2: 95% (17 Sep 2019 04:32) (94% - 100%)    Physical Exam  General: awake and alert, though withdrawn in pain  Card: regular, tachycardic  Pulm: CTA b/l, respirations even and non-labored  Abd: soft, nontender, nondistended  Ext: no LE edema      A/P:  69 y/o F with PMHx of aortic dissection (post-repair several years prior), spinal cord hematoma (now functionally paraplegic), chronic spasticity and pain (on Oxycodone and Baclofen pump), HTN, nephrolithiasis s/p left urethral stent, UTIs and endotheliitis/keratitis (post-corneal transplant), with recent hospitalizations at [facility] for UGIB with acute blood loss anemia requiring multiple PRBC transfusions. Work up there included CTA a/p which was negative and unable to localize source of bleed, s/p multiple EGD which showed pooled blood and/or adherent clot in gastric fundus that could not be removed. Patient transferred here for further management and evaluation by Dr. Ambrocio.     # Sinus tachycardia, likely 2/2 severe back pain vs anemia  - VS hemodynamically stable, afebrile.  - Stat EKG shows sinus tachycardia, . No acute SARI/TWI from prior.   - Check AM CBC.  - Pain management (oxycodone, baclofen, gabapentin, lidoderm, tylenol)  - Continue to monitor closely.   Will endorse to primary team in AM.      Suyapa Krishnamurthy PA-C  Department of Medicine  #[id number]      ADDENDUM:  06:00 H/H resulted 7.0/21.7. Patient still tachycardic to HR 140s despite receiving pain meds, tachycardia likely 2/2 to anemia/GIB. Ordered for 1u PRBC, continue to monitor CBCs. Case discussed with GI, Dr. Ambrocio who recommended confirming gastric bleed. NGT placed through right nare without complication, aspirated approx 2-3cc of bright red blood. Will make patient NPO as she will require EGD today. Will endorse to primary team to coordinate endoscopy this AM. CC: Tachycardia    Event Summary:  Notified by RN for HR 140s this AM. Patient seen and examined at bedside, endorsing significant back pain this morning. Denies CP, palpitations, SOB, dizziness, syncope. No recent episodes of melena/hematochezia, no active bleeding. Last BM yesterday was light brown.  Upon chart review, patient noted to have previous episodes of tachycardia this admission due to pain and anemia.    Vital Signs Last 24 Hrs  T(C): 36.4 (17 Sep 2019 04:32), Max: 36.7 (16 Sep 2019 21:00)  T(F): 97.5 (17 Sep 2019 04:32), Max: 98.1 (16 Sep 2019 21:00)  HR: 105 (17 Sep 2019 04:32) (83 - 105)  BP: 95/63 (17 Sep 2019 04:32) (95/63 - 158/82)  BP(mean): --  RR: 18 (17 Sep 2019 04:32) (18 - 19)  SpO2: 95% (17 Sep 2019 04:32) (94% - 100%)    Physical Exam  General: awake and alert, though withdrawn in pain  Card: regular, tachycardic  Pulm: CTA b/l, respirations even and non-labored  Abd: soft, nontender, nondistended  Ext: no LE edema      A/P:  69 y/o F with PMHx of aortic dissection (post-repair several years prior), spinal cord hematoma (now functionally paraplegic), chronic spasticity and pain (on Oxycodone and Baclofen pump), HTN, nephrolithiasis s/p left urethral stent, UTIs and endotheliitis/keratitis (post-corneal transplant), with recent hospitalizations at [facility] for UGIB with acute blood loss anemia requiring multiple PRBC transfusions. Work up there included CTA a/p which was negative and unable to localize source of bleed, s/p multiple EGD which showed pooled blood and/or adherent clot in gastric fundus that could not be removed. Patient transferred here for further management and evaluation by Dr. Ambrocio.     # Sinus tachycardia, likely 2/2 severe back pain vs anemia  - VS hemodynamically stable, afebrile.  - Stat EKG shows sinus tachycardia, . No acute SARI/TWI from prior.   - Check AM CBC.  - Pain management (oxycodone, baclofen, gabapentin, lidoderm, tylenol)  - Continue to monitor closely.   Will endorse to primary team in AM.      Suyapa Krishnamurthy PA-C  Department of Medicine  #[id number]      ADDENDUM:  06:00 H/H resulted 7.0/21.7. Patient still tachycardic to HR 140s despite receiving pain meds, tachycardia likely 2/2 to anemia/GIB. Ordered for 1u PRBC, continue to monitor CBCs. Case discussed with GI, Dr. Ambrocio who recommended confirming gastric bleed. NGT placed through right nare without complication, aspirated approx 2-3cc of bright red blood. Will make patient NPO as she will require EGD today per Dr. Ambrocio. CC: Tachycardia    Event Summary:  Notified by RN for HR 140s this AM. Patient seen and examined at bedside, endorsing significant back pain this morning. Denies CP, palpitations, SOB, dizziness, syncope. No recent episodes of melena/hematochezia, no active bleeding. Last BM yesterday was light brown.  Upon chart review, patient noted to have previous episodes of tachycardia this admission due to pain and anemia.    Vital Signs Last 24 Hrs  T(C): 36.4 (17 Sep 2019 04:32), Max: 36.7 (16 Sep 2019 21:00)  T(F): 97.5 (17 Sep 2019 04:32), Max: 98.1 (16 Sep 2019 21:00)  HR: 105 (17 Sep 2019 04:32) (83 - 105)  BP: 95/63 (17 Sep 2019 04:32) (95/63 - 158/82)  BP(mean): --  RR: 18 (17 Sep 2019 04:32) (18 - 19)  SpO2: 95% (17 Sep 2019 04:32) (94% - 100%)    Physical Exam  General: awake and alert, though withdrawn in pain  Card: regular, tachycardic  Pulm: CTA b/l, respirations even and non-labored  Abd: soft, nontender, nondistended  Ext: no LE edema      A/P:  69 y/o F with PMHx of aortic dissection (post-repair several years prior), spinal cord hematoma (now functionally paraplegic), chronic spasticity and pain (on Oxycodone and Baclofen pump), HTN, nephrolithiasis s/p left urethral stent, UTIs and endotheliitis/keratitis (post-corneal transplant), with recent hospitalizations at [facility] for UGIB with acute blood loss anemia requiring multiple PRBC transfusions. Work up there included CTA a/p which was negative and unable to localize source of bleed, s/p multiple EGD which showed pooled blood and/or adherent clot in gastric fundus that could not be removed. Patient transferred here for further management and evaluation by Dr. Ambrocio.     # Sinus tachycardia, likely 2/2 severe back pain vs anemia  - VS hemodynamically stable, afebrile.  - Stat EKG shows sinus tachycardia, . No acute SARI/TWI from prior.   - Check AM CBC.  - Pain management (oxycodone, baclofen, gabapentin, lidoderm, tylenol)  - Continue to monitor closely.   Will endorse to primary team in AM.      Suyapa Krishnamurthy PA-C  Department of Medicine  #[id number]      ADDENDUM:  06:00 H/H resulted 7.0/21.7. Patient still tachycardic to HR 140s despite receiving pain meds, tachycardia likely 2/2 to anemia/GIB. Ordered for 1u PRBC, continue to monitor CBCs. Case discussed with GI, Dr. Ambrocio who recommended confirming gastric bleed. NGT placed through right nare without complication, aspirated approx 2-3cc of bright red blood. Will make patient NPO as she will require urgent EGD this morning per Dr. Ambrocio. CC: Tachycardia    Event Summary:  Notified by RN for HR 140s this AM. Patient seen and examined at bedside, endorsing significant back pain this morning. Denies CP, palpitations, SOB, dizziness, syncope. No recent episodes of melena/hematochezia, no active bleeding. Last BM yesterday was light brown.  Upon chart review, patient noted to have previous episodes of tachycardia this admission due to pain and anemia.    Vital Signs Last 24 Hrs  T(C): 36.4 (17 Sep 2019 04:32), Max: 36.7 (16 Sep 2019 21:00)  T(F): 97.5 (17 Sep 2019 04:32), Max: 98.1 (16 Sep 2019 21:00)  HR: 105 (17 Sep 2019 04:32) (83 - 105)  BP: 95/63 (17 Sep 2019 04:32) (95/63 - 158/82)  BP(mean): --  RR: 18 (17 Sep 2019 04:32) (18 - 19)  SpO2: 95% (17 Sep 2019 04:32) (94% - 100%)    Physical Exam  General: awake and alert, though withdrawn in pain  Card: regular, tachycardic  Pulm: CTA b/l, respirations even and non-labored  Abd: soft, nontender, nondistended  Ext: no LE edema      A/P:  69 y/o F with PMHx of aortic dissection (post-repair several years prior), spinal cord hematoma (now functionally paraplegic), chronic spasticity and pain (on Oxycodone and Baclofen pump), HTN, nephrolithiasis s/p left urethral stent, UTIs and endotheliitis/keratitis (post-corneal transplant), with recent hospitalizations at [facility] for UGIB with acute blood loss anemia requiring multiple PRBC transfusions. Work up there included CTA a/p which was negative and unable to localize source of bleed, s/p multiple EGD which showed pooled blood and/or adherent clot in gastric fundus that could not be removed. Patient transferred here for further management and evaluation by Dr. Ambrocio.     # Sinus tachycardia, likely 2/2 severe back pain vs anemia  - VS hemodynamically stable, afebrile.  - Stat EKG shows sinus tachycardia, . No acute SARI/TWI from prior.   - Check AM CBC.  - Pain management (oxycodone, baclofen, gabapentin, lidoderm, tylenol)  - Continue to monitor closely.   Will endorse to primary team in AM.      Suyapa Krishnamurthy PA-C  Department of Medicine  #[id number]      ADDENDUM:  06:00 H/H resulted 7.0/21.7. Patient still tachycardic to HR 140s despite receiving pain meds, tachycardia likely 2/2 to anemia/GIB. Ordered for 1u PRBC, continue to monitor CBCs, given IVF bolus for hypotension while awaiting blood. Case discussed with GI, Dr. Ambrocio who recommended confirming gastric bleed. NGT placed through right nare without complication, aspirated approx 2-3cc of bright red blood. Will make patient NPO as she will require urgent EGD this morning per Dr. Ambrocio.

## 2019-09-17 NOTE — PROVIDER CONTACT NOTE (CRITICAL VALUE NOTIFICATION) - ACTION/TREATMENT ORDERED:
Suyapa will look in pt's chart. Suyapa ordered 1 unit PRBC's. Suyapa ordered 1 unit PRBC's, diet changed to NPO

## 2019-09-17 NOTE — CHART NOTE - NSCHARTNOTEFT_GEN_A_CORE
68F with PMHx of aortic dissection repair, functional paraplegia 2/2 spinal cord hematoma, chronic spasticity and pain, Baclofen pump, Spinal cord stimulator, HTN, nephrolithiasis s/p left urethral stent and endotheliitis/keratitis (post-corneal transplant) transferred from [facility] after suffering UGIB requiring several PRBCs.  Unable to identify active source of bleeding, Patient's PMD is Dr. Branham who recommended transfer to [facility] for evaluation by Dr. Ambrocio.     9/17 active, symptomatic GIB, EGD unable to determine cause. Pt transferred to MICU    Has a H/O dorsalgia, and has a [employer]tronic intrathecal infusion device in place. Is under the care of community pain management specialist Dr Vieira in [place].Medtronic 40 cc intrathecal pump contains:    Lioresal 2000 mcg/ ml at 480 mcg/ day  Morphine 10 mg/ ml at 2.4 mg/ day  Pump will alarm low reservoir volume on 9/22/2019.  At the time of alarm she will have 2 cc remaining in the pump reservoir    Discussed with MICU Fellow and NP:    Pt on Cymbalta which can cause bleeding, consider holding  Pt has Spinal Cord Stimulator in place  Pt has Intrathecal pump infusing Baclofen/Morphine (as above)    Placed call to Dr. Vieira regarding proximity of pump alarm date and will require refill soon  Dr. Vieira does not have privileges, will see if he can find someone who can come and refill pump    Will follow up tomorrow    [facility]  [phone]

## 2019-09-17 NOTE — PROGRESS NOTE ADULT - SUBJECTIVE AND OBJECTIVE BOX
Attempted to examine patient on 9/16/19, but pt stated she was in too much acute pain for back spasms that she preferred not to undergo an eye exam today.     Revisited the pt today, but she was at endoscopy and it was conveyed by the primary team that she was to be transferred to the MICU. Consult is non-urgent and will re-attempt to visit the patient once discharged from endoscopy. Attempted to examine patient on 9/16/19, but pt stated she was in too much acute pain for back spasms that she preferred not to undergo an eye exam today.     Revisited the pt today, but she was at endoscopy and it was conveyed by the primary team that she was to be transferred to the MICU. Consult is non-urgent and will re-attempt to visit the patient once discharged from endoscopy.  Advised team to page when endoscopy was completed and pt was back on the floors.    Agree with above.  Shireen Joshi MD

## 2019-09-17 NOTE — CHART NOTE - NSCHARTNOTEFT_GEN_A_CORE
Came to evaluate patient yesterday, 9/16/19 as she was complaining of left eye pain, however pt refused exam as she was having severe "back spasms".  We informed her that we would come back tomorrow.    Came to evaluate patient again today, 9/17/19 and were informed by the PA that she was having an active GI bleed and was in endoscopy and it was unclear when the procedure would be done.  We advised that she page us when the pt is stable to be examined.    Shireen Joshi MD

## 2019-09-17 NOTE — CHART NOTE - NSCHARTNOTEFT_GEN_A_CORE
this AM notified by nursing/NP o/n event of low BP and tacyhcardia. NG aspiration showing bright red blood. patient transfused 2uPRBC and urgently taking to EGD before my arrival. spoke with  Mr. Portillo in AM and all questions answered.  already made aware of situation by Dr. Ambrocio. EGD was not conclusive for source of bleeding. MICU consulted. IR consulted. Plan to transfer patient from endo suit to MICU

## 2019-09-18 NOTE — PROGRESS NOTE ADULT - SUBJECTIVE AND OBJECTIVE BOX
[facility] Ophthalmology Follow Up Note    Interval:   Pt has since been transferred to MICU following endoscopy for persistent GI bleed, requiring RBC transfusions. Pt currently iintubated     Ophthalmology Exam:  Visual acuity (cc): GT 2/2 intubation sedation  Pupils: PERRL OD, OS surgical  Ttono: 17 OD, 18 OS   Extraocular movements (EOMs): GT 2/2 intubation sedation    Slit lamp Exam (SLE)  External: Flat OU  Lids/Lashes/Lacrimal Ducts: Flat OU, some mucoid discharge OS  Sclera/Conjunctiva: W+Q OD, mild injection OS  Cornea: Cl OD,  PK OS, stitches buried- no loose stitches; bullous elevation of epithelium centrally/inferomedially measuring approx 4x4mm, no epi defect, or Khodadoust lines, diffuse K edema OS. 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  Pt's exam findings were discussed with patient's outpt ophthalmologist, Dr. Blank who is aware that graft is failing. At that time, corneal specialist recommended pred forte for treatment.    - stop Ocufox and Erythro ointment  - c/w Prednisolone acetate 1% drops QID in left eye  - c/w valtrex PO 1g TID   - outpatient follow up  - findings and plan discussed with primary team, will discuss with patient tomorrow.    Outpatient follow-up: Patient should follow-up with his/her ophthalmologist or with [facility] Department of Ophthalmology- Cornea within 1 week of after discharge at:    [address]  [place] [address]  [phone] [facility] Ophthalmology Follow Up Note    Interval:   Pt has since been transferred to MICU following endoscopy for persistent GI bleed, requiring RBC transfusions. Pt currently iintubated     Ophthalmology Exam:  Visual acuity (cc): GT 2/2 intubation sedation  Pupils: PERRL OD, OS surgical  Ttono: 17 OD, 18 OS   Extraocular movements (EOMs): GT 2/2 intubation sedation    Slit lamp Exam (SLE)  External: Flat OU  Lids/Lashes/Lacrimal Ducts: Flat OU, some mucoid discharge OS  Sclera/Conjunctiva: W+Q OD, mild injection OS  Cornea: Cl OD,  PK OS, stitches buried- no loose stitches; improved bullous elevation of epithelium inferiorly measuring approx 2x0.5mm no epi defect, nor Khodadoust lines, diffuse K edema OS. 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  Pt's exam findings were discussed with patient's outpt ophthalmologist, Dr. Blank who is aware that graft is failing. At that time, corneal specialist recommended pred forte for treatment. Recommend continuation of current regimen     - c/w Prednisolone acetate 1% drops QID in left eye  - c/w valtrex PO 1g TID   - outpatient follow up    Outpatient follow-up: Patient should follow-up with his/her ophthalmologist or with [facility] Department of Ophthalmology- Cornea within 1 week of after discharge at:    [address]  [place] [address]  [phone] [facility] Ophthalmology Follow Up Note    Interval:   Pt has since been transferred to MICU following endoscopy for persistent GI bleed, requiring RBC transfusions. Pt currently iintubated so unable to obtain history, but appears comfortable.    Ophthalmology Exam:  Visual acuity (cc): GT 2/2 intubation sedation  Pupils: PERRL OD, OS surgical, no RAPD by reverse  Ttono: 17 OD, 18 OS   Extraocular movements (EOMs): GT 2/2 intubation sedation    Slit lamp Exam (SLE)  External: Flat OU  Lids/Lashes/Lacrimal Ducts: Flat OU, some mucoid discharge OS  Sclera/Conjunctiva: W+Q OD, trace injection OS  Cornea: Cl OD,  PK OS, stitches buried- no loose stitches; improved bullous elevation of epithelium inferiorly measuring approx 2x0.5mm no epi defect, nor Khodadoust lines, diffuse K edema OS. 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  Pt's exam findings were discussed with patient's outpt ophthalmologist, Dr. Blank who is aware that graft is failing. At that time, corneal specialist recommended pred forte for treatment.  Will change regimen to the following:  - c/w Prednisolone acetate 1% drops TID in left eye  - divina gtts TID OS  - preservative free AT q2h OS  - c/w valtrex PO 1g TID   - outpatient follow up  - findings and plan discussed with Dr. Blank and primary team    Outpatient follow-up: Patient should follow-up with his/her ophthalmologist or with [facility] Department of Ophthalmology- Cornea within 1 week of after discharge at:    [address]  [place] [address]  [phone]

## 2019-09-18 NOTE — OCCUPATIONAL THERAPY INITIAL EVALUATION ADULT - BALANCE TRAINING, PT EVAL
Pt will increase dynamic sitting balance 1/2 grade to increase safety/independence with seated ADLs within 4 weeks

## 2019-09-18 NOTE — CHART NOTE - NSCHARTNOTEFT_GEN_A_CORE
MICU Transfer Note    Transfer from: MICU    Transfer to: ( ) Medicine    (  ) Telemetry     (   ) RCU        (    ) Palliative         (   ) Stroke Unit          (   ) __________________    Accepting Physician:  Signout given to:     MICU COURSE:  69 y/o F with PMHx of aortic dissection (post-repair several years prior), spinal cord hematoma (now functionally paraplegic), chronic spasticity and pain (on Oxycodone and Baclofen pump), HTN, nephrolithiasis s/p left urethral stent, UTIs and endotheliitis/keratitis (post-corneal transplant), with recent hospitalizations at [facility] for UGIB with acute blood loss anemia requiring multiple PRBC transfusions. Patient was at home in early August 2019 when  thought she was not mentating well. He took her vitals at that time and found her to have a systolic BP in the 70s and HR in the 150s. When he brought her to [facility] they found her Hg to be 4.4. Unknown if she was having melena or hematochezia at that time. While she was there patient underwent four EGDs and received more than 10 units PRBC. While she was there patient underwent four EGDs and received more than 10 units PRBC. EGDs were unable to identify active source of bleeding and usually showed pooled blood. There is suspicion for dieulafoy, but unable to be acted upon at [place] with the multiple EGDs at times only showing adherent clot. Surgery consult recommended ex-lap given the severity, but patient's family deferred for now. Patient's PMD is Dr. Branham who recommended transfer to [facility] for evaluation by Dr. Ambrocio.     At [facility], EGD by Dr. Ambrocio 9/5 showed acute gastritis, gastric polyps with no source of GI bleed identified. Gastritis neg for H.Pylori. Enteroscopy 9/10 showed jejunum normal. small hiatal hernia. PillCam (VCE) study performed 9/11 showed active bleeding with large amount of blood noted. Bleeding site appeared to be in the distal aspect of the stomach. Emergent EGD was performed again on 9/13 after pt had bloody BM O/N but showed no evidence of active bleeding.  On morning of 9/17, Pt was tachycardic w/ HR in the 140s and notes to be in pain despite receiving pain medication. NGT was placed and aspirated approx 2-3cc of bring red blood. H/H was 7/21.7. S/p 2u PRBC today. Pt made NPO and underwent urgent EGD again on 9/17, which failed to show active bleeding sites. Pt intubated for EDG and pending transfer to MICU.      Of note, pt's hospital course c/b fevers up to 102.7F, leukocytosis and positive UA concerning for UTI. Ucx (9/9) was positive for Pseudomonas and Klebsiella ESBL. Bacteruria cleared after 5 day course of meropenum. 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 Baclofen pump due for refill 9/22.     At the MICU, pt was initially intubated and sedated w/ propofol. NG placed and lavage showing dark red blood/stomach contents. H/H dropped to 6.7 on 9/18, pt received 1U PRBC and responded appropriately. Pt then underwent CTA on 9/18, which was neg for any source of bleeding. GI is following.  During time in MICU, pt had HTN with BP in the 180 and was given labetalol for a goal SBP of 130-150.   Patient to be extubated following CTA and pending transfer to floor following extubation.     ASSESSMENT & PLAN:   69 y/o F with PMHx of aortic dissection (post-repair several years prior), spinal cord hematoma (now functionally paraplegic), chronic spasticity and pain (on Oxycodone and Baclofen pump), HTN, nephrolithiasis s/p left urethral stent, UTIs and endotheliitis/keratitis (post-corneal transplant), with recent hospitalizations at [facility] for UGIB. s/p multiple PRBC transfusions and EGDs. CTA a/p  negative and unable to localize source of bleed.  Patient transferred to MICU for further management post EDG 9/17.     # Neuro:   - currently intubated -> possible extubation today with CPAP trial   - sedated on propofol, wean as tolerated  - baseline: A&Ox3  - chronic pain -> switch to medication through NG tube.  - Paraplegia 2/2 spinal hematoma   - Baclofen pump replace by 9/22 -> Vaishali Muniz NP from [facility] (extension:[phone], cell: [phone]-> off Thursday) contacted [facility] who can replace the pump, waiting supply from Dr. Vieira's office    # CV:  - hx of aortic dissection -> post repair and appears stable on recent CTA performed at [place]  - HTN upon transfer w/ BP in 180/80s -> target -150, if >150, consider IVP of Lopressor     # Resp:  - Currently intubated -> possible extubation later today  - Vent setting: PEEP 5 FIO2 40%  RR 12     # GI:  - CTA w/o no definite of bleeding    - EGD 9/17 -> no active bleeding site found  - Frequent H/H (CBC q6), active type and screen-> give PRBC if Hgh dropped below 7   - keep NPO  - c/w protonix 40mg BID   - - f/u with GI consult ( [phone]) -> updated results of CTA, says its okay to extubate following CTA, no provoked bleeding test for now   - Patient has NG tube -> nursing notes that it is leaking -> will need to replaced, check if there is any bleeding when lavaged.     # Renal  - Creatinine 0.54, will keep monitor   - No active issues    # :  - Ucx (9/9) was positive for Pseudomonas and Klebsiella ESBL s/p meropenum x5day. Most recent Ucx neg.   - paraplegia: straight cath (patient has chronic urinary retention and is cathed by  at home) q4hr  - Miralax for chronic stool retention (does periodic manual disimpactions at home)    # ID:  - Ucx (9/9) was positive for Pseudomonas and Klebsiella ESBL s/p meropenum x5day -> dc today. Most recent Ucx (9/14) neg.  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    FOR FOLLOW UP: MICU Transfer Note    Transfer from: MICU    Transfer to: (X) Medicine    (  ) Telemetry     (   ) RCU        (    ) Palliative         (   ) Stroke Unit          (   ) __________________    Accepting Physician:  Signout given to:     MICU COURSE:  67 y/o F with PMHx of aortic dissection (post-repair several years prior), spinal cord hematoma (now functionally paraplegic), chronic spasticity and pain (on Oxycodone and Baclofen pump), HTN, nephrolithiasis s/p left urethral stent, UTIs and endotheliitis/keratitis (post-corneal transplant), with recent hospitalizations at [facility] for UGIB with acute blood loss anemia requiring multiple PRBC transfusions. Patient was at home in early August 2019 when  thought she was not mentating well. He took her vitals at that time and found her to have a systolic BP in the 70s and HR in the 150s. When he brought her to [facility] they found her Hg to be 4.4. Unknown if she was having melena or hematochezia at that time. While she was there patient underwent four EGDs and received more than 10 units PRBC. While she was there patient underwent four EGDs and received more than 10 units PRBC. EGDs were unable to identify active source of bleeding and usually showed pooled blood. There is suspicion for dieulafoy, but unable to be acted upon at [place] with the multiple EGDs at times only showing adherent clot. Surgery consult recommended ex-lap given the severity, but patient's family deferred for now. Patient's PMD is Dr. Branham who recommended transfer to [facility] for evaluation by Dr. Ambrocio.     At [facility], EGD by Dr. Ambrocio 9/5 showed acute gastritis, gastric polyps with no source of GI bleed identified. Gastritis neg for H.Pylori. Enteroscopy 9/10 showed jejunum normal. small hiatal hernia. PillCam (VCE) study performed 9/11 showed active bleeding with large amount of blood noted. Bleeding site appeared to be in the distal aspect of the stomach. Emergent EGD was performed again on 9/13 after pt had bloody BM O/N but showed no evidence of active bleeding.  On morning of 9/17, Pt was tachycardic w/ HR in the 140s and notes to be in pain despite receiving pain medication. NGT was placed and aspirated approx 2-3cc of bring red blood. H/H was 7/21.7. S/p 2u PRBC today. Pt made NPO and underwent urgent EGD again on 9/17, which failed to show active bleeding sites. Pt intubated for EDG and pending transfer to MICU.      Of note, pt's hospital course c/b fevers up to 102.7F, leukocytosis and positive UA concerning for UTI. Ucx (9/9) was positive for Pseudomonas and Klebsiella ESBL. Bacteruria cleared after 5 day course of meropenum. 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 Baclofen pump due for refill 9/22.     At the MICU, pt was initially intubated and sedated w/ propofol. NG placed and lavage showing dark red blood/stomach contents. H/H dropped to 6.7 on 9/18, pt received 1U PRBC and responded appropriately. Pt then underwent CTA on 9/18, which was neg for any source of bleeding. GI is following.  During time in MICU, pt had HTN with BP in the 180 and put on labetalol 200mg BID for a goal SBP of 130-150.   Patient successfully extubated 9/19 following CTA. Pt advanced to liquid only diet. Discussed with GI and Dr. Branham regarding possible provoked bleeding test to find source of bleeding and was told that the procedure is contraindicated due to prior hx of spontaneous subdural hematoma. PT is pending transfer to floor following extubation.     ASSESSMENT & PLAN:   67 y/o F with PMHx of aortic dissection (post-repair several years prior), spinal cord hematoma (now functionally paraplegic), chronic spasticity and pain (on Oxycodone and Baclofen pump), HTN, nephrolithiasis s/p left urethral stent, UTIs and endotheliitis/keratitis (post-corneal transplant), with recent hospitalizations at [facility] for UGIB. s/p multiple PRBC transfusions and EGDs. CTA a/p  negative and unable to localize source of bleed.  Patient transferred to MICU for further management post EDG 9/17.     # Neuro:   - currently intubated -> possible extubation today with CPAP trial   - sedated on propofol, wean as tolerated  - baseline: A&Ox3  - chronic pain -> switch to medication through NG tube.  - Paraplegia 2/2 spinal hematoma   - Baclofen pump replace by 9/22 -> Vaishali Muniz ANEUDY from [facility] (extension:[phone], cell: [phone]-> off Thursday) contacted [facility] who can replace the pump, waiting supply from Dr. Vieira's office    # CV:  - hx of aortic dissection -> post repair and appears stable on recent CTA performed at [place]  - HTN upon transfer w/ BP in 180/80s -> target -150, if >150, consider IVP of Lopressor     # Resp:  - Currently intubated -> possible extubation later today  - Vent setting: PEEP 5 FIO2 40%  RR 12     # GI:  - CTA w/o no definite of bleeding    - EGD 9/17 -> no active bleeding site found  - H/H currently stable  - CBC q6h->daily, active type and screen-> give PRBC if Hgh dropped below 7   - keep NPO  - c/w protonix 40mg BID   - - f/u with GI consult ( [phone]) -> updated results of CTA, says its okay to extubate following CTA, no provoked bleeding test for now   - Patient has NG tube -> nursing notes that it is leaking -> will need to replaced, check if there is any bleeding when lavaged.     # Renal  - Creatinine 0.54, will keep monitor   - No active issues    # :  - Ucx (9/9) was positive for Pseudomonas and Klebsiella ESBL s/p meropenum x5day. Most recent Ucx neg.   - paraplegia: straight cath (patient has chronic urinary retention and is cathed by  at home) q4hr  - Miralax for chronic stool retention (does periodic manual disimpactions at home)    # ID:  - Ucx (9/9) was positive for Pseudomonas and Klebsiella ESBL s/p meropenum x5day. Most recent Ucx (9/14) neg.  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, but  can only say "a virus" and now s/p corneal transplant which is failing according to opt optho    - C/w Valtrex for viral suppression    - C/w Prednisolone eye drop into left eye    - Daily artificial tears    - F/u optho recs    # DVT ppx:  -SCDs    FOR FOLLOW UP:   - GI consult  - monitor H/H, active type and screen    - Optho consult  - F/u pt's chronic pain MICU Transfer Note    Transfer from: MICU    Transfer to: (X) Medicine    (  ) Telemetry     (   ) RCU        (    ) Palliative         (   ) Stroke Unit          (   ) __________________    Accepting Physician:  Signout given to:     MICU COURSE:  67 y/o F with PMHx of aortic dissection (post-repair several years prior), spinal cord hematoma (now functionally paraplegic), chronic spasticity and pain (on Oxycodone and Baclofen pump), HTN, nephrolithiasis s/p left urethral stent, UTIs and endotheliitis/keratitis (post-corneal transplant), with recent hospitalizations at [facility] for UGIB with acute blood loss anemia requiring multiple PRBC transfusions. Patient was at home in early August 2019 when  thought she was not mentating well. He took her vitals at that time and found her to have a systolic BP in the 70s and HR in the 150s. When he brought her to [facility] they found her Hg to be 4.4. Unknown if she was having melena or hematochezia at that time. While she was there patient underwent four EGDs and received more than 10 units PRBC. While she was there patient underwent four EGDs and received more than 10 units PRBC. EGDs were unable to identify active source of bleeding and usually showed pooled blood. There is suspicion for dieulafoy, but unable to be acted upon at [place] with the multiple EGDs at times only showing adherent clot. Surgery consult recommended ex-lap given the severity, but patient's family deferred for now. Patient's PMD is Dr. Branham who recommended transfer to [facility] for evaluation by Dr. Ambrocio.     At [facility], EGD by Dr. Ambrocio 9/5 showed acute gastritis, gastric polyps with no source of GI bleed identified. Gastritis neg for H.Pylori. Enteroscopy 9/10 showed jejunum normal. small hiatal hernia. PillCam (VCE) study performed 9/11 showed active bleeding with large amount of blood noted. Bleeding site appeared to be in the distal aspect of the stomach. Emergent EGD was performed again on 9/13 after pt had bloody BM O/N but showed no evidence of active bleeding.  On morning of 9/17, Pt was tachycardic w/ HR in the 140s and notes to be in pain despite receiving pain medication. NGT was placed and aspirated approx 2-3cc of bring red blood. H/H was 7/21.7. S/p 2u PRBC today. Pt made NPO and underwent urgent EGD again on 9/17, which failed to show active bleeding sites. Pt intubated for EDG and pending transfer to MICU.      Of note, pt's hospital course c/b fevers up to 102.7F, leukocytosis and positive UA concerning for UTI. Ucx (9/9) was positive for Pseudomonas and Klebsiella ESBL. Bacteruria cleared after 5 day course of meropenum. 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 Baclofen pump due for refill 9/22.     At the MICU, pt was initially intubated and sedated w/ propofol. NG placed and lavage showing dark red blood/stomach contents. H/H dropped to 6.7 on 9/18, pt received 1U PRBC and responded appropriately. Pt then underwent CTA on 9/18, which was neg for any source of bleeding. GI is following.  During time in MICU, pt had HTN with BP in the 180 and put on labetalol 200mg BID for a goal SBP of 130-150.   Patient successfully extubated 9/19 following CTA. Pt advanced to liquid only diet. Discussed with GI and Dr. Branham regarding possible provoked bleeding test to find source of bleeding and was told that the procedure is contraindicated due to prior hx of spontaneous subdural hematoma. PT is pending transfer to floor following extubation.     ASSESSMENT & PLAN:   67 y/o F with PMHx of aortic dissection (post-repair several years prior), spinal cord hematoma (now functionally paraplegic), chronic spasticity and pain (on Oxycodone and Baclofen pump), HTN, nephrolithiasis s/p left urethral stent, UTIs and endotheliitis/keratitis (post-corneal transplant), with recent hospitalizations at [facility] for UGIB. s/p multiple PRBC transfusions and EGDs. CTA a/p  negative and unable to localize source of bleed.  Patient transferred to MICU for further management post EDG 9/17. Pt successfully extubated 9/19. Pending transfer to floors.     # Neuro:   - A&Ox3  - chronic pain -> switch to medication through NG tube.  - Functional paraplegia 2/2 spinal hematoma   - Baclofen pump replaced yesterday -> dc oral baclofen     # CV:  - hx of aortic dissection -> post repair and appears stable on recent CTA performed at [place]  - HTN upon transfer w/ BP in 180/80s -> target -150  - c/w Labetalol 200mg BID     # Resp:  - Successfully extubated 9/18  - Satting appropriately on 1L NC  - no active issues     # GI:  - CTA w/o no definite of bleeding    - EGD 9/17 -> no active bleeding site found  - H/H currently stable   - CBC q6h->daily, active type and screen,  PRBC if Hgh dropped below 7   - NPO -> advance to liquid diet   - c/w protonix 40mg BID   - NG tube replaced 9/18, initially put out 80cc of coffee ground emesis. However, hb stable at ~10 -> remove NG tube after bedside swallow evaluation   - f/u with GI consult ( [phone]) -> updated results of CTA, says its okay to extubate following CTA, no provoked bleeding test for now   - spoke with Dr. Basil Branham, who is familiar with patient's case regarding possible provoked bleeding test -> says that provoked bleeding test contraindicated due to previous hx of subdural hematoma        # Renal  - Creatinine 0.40, will keep monitor   - No active issues    # :  - Ucx (9/9) was positive for Pseudomonas and Klebsiella ESBL s/p meropenum x5day. Most recent Ucx neg.   - paraplegia: straight cath (patient has chronic urinary retention and is cathed by  at home) q4hr  - Miralax for chronic stool retention (does periodic manual disimpactions at home)    # ID:  - Ucx (9/9) was positive for Pseudomonas and Klebsiella ESBL s/p meropenum x5day -> dc today. Most recent Ucx (9/14) neg.  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 Uli Pennington   Signout given to:     MICU COURSE:  69 y/o F with PMHx of aortic dissection (post-repair several years prior), spinal cord hematoma (now functionally paraplegic), chronic spasticity and pain (on Oxycodone and Baclofen pump), HTN, nephrolithiasis s/p left urethral stent, UTIs and endotheliitis/keratitis (post-corneal transplant), with recent hospitalizations at [facility] for UGIB with acute blood loss anemia requiring multiple PRBC transfusions. Patient was at home in early August 2019 when  thought she was not mentating well. He took her vitals at that time and found her to have a systolic BP in the 70s and HR in the 150s. When he brought her to [facility] they found her Hg to be 4.4. Unknown if she was having melena or hematochezia at that time. While she was there patient underwent four EGDs and received more than 10 units PRBC. While she was there patient underwent four EGDs and received more than 10 units PRBC. EGDs were unable to identify active source of bleeding and usually showed pooled blood. There is suspicion for dieulafoy, but unable to be acted upon at [place] with the multiple EGDs at times only showing adherent clot. Surgery consult recommended ex-lap given the severity, but patient's family deferred for now. Patient's PMD is Dr. Branham who recommended transfer to [facility] for evaluation by Dr. Ambrocio.     At [facility], EGD by Dr. Ambrocio 9/5 showed acute gastritis, gastric polyps with no source of GI bleed identified. Gastritis neg for H.Pylori. Enteroscopy 9/10 showed jejunum normal. small hiatal hernia. PillCam (VCE) study performed 9/11 showed active bleeding with large amount of blood noted. Bleeding site appeared to be in the distal aspect of the stomach. Emergent EGD was performed again on 9/13 after pt had bloody BM O/N but showed no evidence of active bleeding.  On morning of 9/17, Pt was tachycardic w/ HR in the 140s and notes to be in pain despite receiving pain medication. NGT was placed and aspirated approx 2-3cc of bring red blood. H/H was 7/21.7. S/p 2u PRBC today. Pt made NPO and underwent urgent EGD again on 9/17, which failed to show active bleeding sites. Pt intubated for EDG and pending transfer to MICU.      Of note, pt's hospital course c/b fevers up to 102.7F, leukocytosis and positive UA concerning for UTI. Ucx (9/9) was positive for Pseudomonas and Klebsiella ESBL. Bacteruria cleared after 5 day course of meropenum. 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 Baclofen pump due for refill 9/22.     At the MICU, pt was initially intubated and sedated w/ propofol. NG placed and lavage showing dark red blood/stomach contents. H/H dropped to 6.7 on 9/18, pt received 1U PRBC and responded appropriately. Pt then underwent CTA on 9/18, which was neg for any source of bleeding. GI is following.  During time in MICU, pt had HTN with BP in the 180 and put on labetalol 200mg BID for a goal SBP of 130-150.   Patient successfully extubated 9/19 following CTA. Pt advanced to liquid only diet. Discussed with GI and Dr. Branham regarding possible provoked bleeding test to find source of bleeding and was told that the procedure is contraindicated due to prior hx of spontaneous subdural hematoma. PT is pending transfer to floor following extubation.     ASSESSMENT & PLAN:   69 y/o F with PMHx of aortic dissection (post-repair several years prior), spinal cord hematoma (now functionally paraplegic), chronic spasticity and pain (on Oxycodone and Baclofen pump), HTN, nephrolithiasis s/p left urethral stent, UTIs and endotheliitis/keratitis (post-corneal transplant), with recent hospitalizations at [facility] for UGIB. s/p multiple PRBC transfusions and EGDs. CTA a/p  negative and unable to localize source of bleed.  Patient transferred to MICU for further management post EDG 9/17. Pt successfully extubated 9/19. Pending transfer to floors.     # Neuro:   - A&Ox3  - chronic pain -> switch to medication through NG tube.  - Functional paraplegia 2/2 spinal hematoma   - Baclofen pump replaced yesterday -> dc oral baclofen, clarify w/ Vaishali NP from [facility] if pump is filled w/ baclofen or baclofen+morphine     # CV:  - hx of aortic dissection -> post repair and appears stable on recent CTA performed at [place]  - HTN upon transfer w/ BP in 180/80s -> target -150  - c/w Labetalol 200mg BID     # Resp:  - Successfully extubated 9/18  - Satting appropriately on 1L NC  - no active issues     # GI:  - CTA w/o no definite of bleeding    - EGD 9/17 -> no active bleeding site found  - H/H currently stable   - CBC q6h->daily, active type and screen,  PRBC if Hgh dropped below 7   - Advance to liquid diet   - c/w protonix 40mg BID   - NG tube replaced 9/18, initially put out 80cc of coffee ground emesis. However, hb stable at ~10 -> remove NG tube 9/19   - f/u with GI consult (Dr. Ambrocio [phone])  - Spoke with Dr. Basil Branham, who is pt's PMD regarding possible provoked bleeding test -> Dr. Branham notes that provoked bleeding test is contraindicated in pt due to previous hx of spontaneous subdural hematoma        # Renal  - Creatinine 0.40, will keep monitor   - No active issues    # :  - Ucx (9/9) was positive for Pseudomonas and Klebsiella ESBL s/p meropenum x5day. Most recent Ucx neg.   - paraplegia: straight cath (patient has chronic urinary retention and is cathed by  at home) q4hr  - Miralax for chronic stool retention (does periodic manual disimpactions at home)    # ID:  - Ucx (9/9) was positive for Pseudomonas and Klebsiella ESBL s/p meropenum x5day -> dc today. Most recent Ucx (9/14) neg.  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 Need clarification with Vaishali Muniz NP from [facility] (extension: [phone], cell: [phone], Unable to contact 9/19 as she was off) MICU Transfer Note    Transfer from: MICU    Transfer to: (X) Medicine    (  ) Telemetry     (   ) RCU        (    ) Palliative         (   ) Stroke Unit          (   ) __________________    Accepting Physician: Dr. Uli Pennington   Signout given to:     MICU COURSE:  67 y/o F with PMHx of aortic dissection (post-repair several years prior), spinal cord hematoma (now functionally paraplegic), chronic spasticity and pain (on Oxycodone and Baclofen pump), HTN, nephrolithiasis s/p left urethral stent, UTIs and endotheliitis/keratitis (post-corneal transplant), with recent hospitalizations at [facility] for UGIB with acute blood loss anemia requiring multiple PRBC transfusions. Patient was at home in early August 2019 when  thought she was not mentating well. He took her vitals at that time and found her to have a systolic BP in the 70s and HR in the 150s. When he brought her to [facility] they found her Hg to be 4.4. Unknown if she was having melena or hematochezia at that time. While she was there patient underwent four EGDs and received more than 10 units PRBC. While she was there patient underwent four EGDs and received more than 10 units PRBC. EGDs were unable to identify active source of bleeding and usually showed pooled blood. There is suspicion for dieulafoy, but unable to be acted upon at [place] with the multiple EGDs at times only showing adherent clot. Surgery consult recommended ex-lap given the severity, but patient's family deferred for now. Patient's PMD is Dr. Branham who recommended transfer to [facility] for evaluation by Dr. Ambrocio.     At [facility], EGD by Dr. Ambrocio 9/5 showed acute gastritis, gastric polyps with no source of GI bleed identified. Gastritis neg for H.Pylori. Enteroscopy 9/10 showed jejunum normal. small hiatal hernia. PillCam (VCE) study performed 9/11 showed active bleeding with large amount of blood noted. Bleeding site appeared to be in the distal aspect of the stomach. Emergent EGD was performed again on 9/13 after pt had bloody BM O/N but showed no evidence of active bleeding.  On morning of 9/17, Pt was tachycardic w/ HR in the 140s and notes to be in pain despite receiving pain medication. NGT was placed and aspirated approx 2-3cc of bring red blood. H/H was 7/21.7. S/p 2u PRBC today. Pt made NPO and underwent urgent EGD again on 9/17, which failed to show active bleeding sites. Pt intubated for EDG and pending transfer to MICU.      Of note, pt's hospital course c/b fevers up to 102.7F, leukocytosis and positive UA concerning for UTI. Ucx (9/9) was positive for Pseudomonas and Klebsiella ESBL. Bacteruria cleared after 5 day course of meropenum. 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 Baclofen pump due for refill 9/22.     At the MICU, pt was initially intubated and sedated w/ propofol. NG placed and lavage showing dark red blood/stomach contents. H/H dropped to 6.7 on 9/18, pt received 1U PRBC and responded appropriately. Pt then underwent CTA on 9/18, which was neg for any source of bleeding. GI is following.  During time in MICU, pt had HTN with BP in the 180 and put on labetalol 200mg BID for a goal SBP of 130-150.   Patient successfully extubated 9/19 following CTA. Pt advanced to liquid only diet. Discussed with GI and Dr. Branham regarding possible provoked bleeding test to find source of bleeding and was told that the procedure is contraindicated due to prior hx of spontaneous subdural hematoma. GI following and does not want any intervention for now, will continue to monitor once transferred to floor.     ASSESSMENT & PLAN:   67 y/o F with PMHx of aortic dissection (post-repair several years prior), spinal cord hematoma (now functionally paraplegic), chronic spasticity and pain (on Oxycodone and Baclofen pump), HTN, nephrolithiasis s/p left urethral stent, UTIs and endotheliitis/keratitis (post-corneal transplant), with recent hospitalizations at [facility] for UGIB. s/p multiple PRBC transfusions and EGDs. CTA a/p  negative and unable to localize source of bleed.  Patient transferred to MICU for further management post EDG 9/17. Pt successfully extubated 9/19. Pending transfer to floors.     # Neuro:   - A&Ox3  - chronic pain -> switch to medication through NG tube.  - Functional paraplegia 2/2 spinal hematoma   - Baclofen pump replaced yesterday -> dc oral baclofen, clarify w/ Vaishali NP from [facility] if pump is filled w/ baclofen or baclofen+morphine     # CV:  - hx of aortic dissection -> post repair and appears stable on recent CTA performed at [place]  - HTN upon transfer w/ BP in 180/80s -> target -150  - c/w Labetalol 200mg BID     # Resp:  - Successfully extubated 9/18  - Satting appropriately on 1L NC  - no active issues     # GI:  - CTA w/o no definite of bleeding    - EGD 9/17 -> no active bleeding site found  - H/H currently stable   - CBC q6h->daily, active type and screen,  PRBC if Hgh dropped below 7   - Advance to liquid diet   - c/w protonix 40mg BID   - NG tube replaced 9/18, initially put out 80cc of coffee ground emesis. However, hb stable at ~10 -> remove NG tube 9/19   - f/u with GI consult (Dr. Ambrocio [phone])  - Spoke with Dr. Basil Branham, who is pt's PMD regarding possible provoked bleeding test -> Dr. Branham notes that provoked bleeding test is contraindicated in pt due to previous hx of spontaneous subdural hematoma        # Renal  - Creatinine 0.40, will keep monitor   - No active issues    # :  - Ucx (9/9) was positive for Pseudomonas and Klebsiella ESBL s/p meropenum x5day. Most recent Ucx neg.   - paraplegia: straight cath (patient has chronic urinary retention and is cathed by  at home) q4hr  - Miralax for chronic stool retention (does periodic manual disimpactions at home)    # ID:  - Ucx (9/9) was positive for Pseudomonas and Klebsiella ESBL s/p meropenum x5day -> dc today. Most recent Ucx (9/14) neg.  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 Need clarification with Vaishali Muniz NP from [facility] (extension: [phone], cell: [phone], Unable to contact 9/19 as she was off)

## 2019-09-18 NOTE — OCCUPATIONAL THERAPY INITIAL EVALUATION ADULT - MD ORDER
evaluate and treat evaluate and treat  Increase as tolerated evaluate and treat  Increase as tolerated  OT functional evaluation 9/20

## 2019-09-18 NOTE — PROGRESS NOTE ADULT - ASSESSMENT
69 y/o F with PMHx of aortic dissection (post-repair several years prior), spinal cord hematoma (now functionally paraplegic), chronic spasticity and pain (on Oxycodone and Baclofen pump), HTN, nephrolithiasis s/p left urethral stent, UTIs and endotheliitis/keratitis (post-corneal transplant), with recent hospitalizations at [facility] for UGIB. s/p multiple PRBC transfusions and EGDs. CTA a/p  negative and unable to localize source of bleed.  Patient transferred to MICU for further management post EDG 9/17.    # Neuro:   - currently intubated -> possible extubation later today  - sedated on propofol, wean as tolerated  - baseline: A&Ox3  - chronic pain -> switch to medication through NG tube.  - Paraplegia 2/2 spinal hematoma     # CV:  - hx of aortic dissection -> post repair and appears stable on recent CTA performed at [place]  - HTN upon transfer w/ BP in 180/80s     # Resp:  - Currently intubated -> possible extubation later today  - Vent setting: PEEP 5 FIO2 100%  RR 12     # GI:  - EGD 9/17 -> no active bleeding site found  - Transfer to ICU w/ frequent H/H (CBC q6), active type and screen   - keep NPO  - If sign of bleeding (Hb drop, hypotension + tachycardia), urgent GI consult for possible CT angio, IR intervention or repeat EGD.  - Patient has NG tube now, will check for placement of NG tube, and check if there is any bleeding when lavaged.     # Renal  - Creatinine 0.65, will keep monitor   - no active issues    # :  - Ucx (9/9) was positive for Pseudomonas and Klebsiella ESBL s/p meropenum x5day. Most recent Ucx neg.   - paraplegia: straight cath (patient has chronic urinary retention and is cathed by  at home) q4hr  - Miralax for chronic stool retention (does periodic manual disimpactions at home)    # ID:  - Ucx (9/9) was positive for Pseudomonas and Klebsiella ESBL s/p meropenum x5day. Most recent Ucx neg.  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 69 y/o F with PMHx of aortic dissection (post-repair several years prior), spinal cord hematoma (now functionally paraplegic), chronic spasticity and pain (on Oxycodone and Baclofen pump), HTN, nephrolithiasis s/p left urethral stent, UTIs and endotheliitis/keratitis (post-corneal transplant), with recent hospitalizations at [facility] for UGIB. s/p multiple PRBC transfusions and EGDs. CTA a/p  negative and unable to localize source of bleed.  Patient transferred to MICU for further management post EDG 9/17.    # Neuro:   - currently intubated -> possible extubation today with CPAP trial   - sedated on propofol, wean as tolerated  - baseline: A&Ox3  - chronic pain -> switch to medication through NG tube.  - Paraplegia 2/2 spinal hematoma     # CV:  - hx of aortic dissection -> post repair and appears stable on recent CTA performed at [place]  - HTN upon transfer w/ BP in 180/80s     # Resp:  - Currently intubated -> possible extubation later today  - Vent setting: PEEP 5 FIO2 40%  RR 12     # GI:  - EGD 9/17 -> no active bleeding site found  - Frequent H/H (CBC q6), active type and screen ->give PRBC if Hgh droped below 7   - keep NPO  - c/w protonix 40mg BID   - If sign of bleeding (Hb drop, hypotension + tachycardia), urgent GI consult for possible CT angio, IR intervention or repeat EGD.  - Patient has NG tube now, will check for placement of NG tube, and check if there is any bleeding when lavaged.     # Renal  - Creatinine 0.54, will keep monitor   - No active issues    # :  - Ucx (9/9) was positive for Pseudomonas and Klebsiella ESBL s/p meropenum x5day. Most recent Ucx neg.   - paraplegia: straight cath (patient has chronic urinary retention and is cathed by  at home) q4hr  - Miralax for chronic stool retention (does periodic manual disimpactions at home)    # ID:  - Ucx (9/9) was positive for Pseudomonas and Klebsiella ESBL s/p meropenum x5day. Most recent Ucx (9/14) neg.  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 69 y/o F with PMHx of aortic dissection (post-repair several years prior), spinal cord hematoma (now functionally paraplegic), chronic spasticity and pain (on Oxycodone and Baclofen pump), HTN, nephrolithiasis s/p left urethral stent, UTIs and endotheliitis/keratitis (post-corneal transplant), with recent hospitalizations at [facility] for UGIB. s/p multiple PRBC transfusions and EGDs. CTA a/p  negative and unable to localize source of bleed.  Patient transferred to MICU for further management post EDG 9/17.    # Neuro:   - currently intubated -> possible extubation today with CPAP trial   - sedated on propofol, wean as tolerated  - baseline: A&Ox3  - chronic pain -> switch to medication through NG tube.  - Paraplegia 2/2 spinal hematoma   - f/u on baclofen pump     # CV:  - hx of aortic dissection -> post repair and appears stable on recent CTA performed at [place]  - HTN upon transfer w/ BP in 180/80s -> target -150, if >150, consider IVP of Lopressor     # Resp:  - Currently intubated -> possible extubation later today  - Vent setting: PEEP 5 FIO2 40%  RR 12     # GI:  - EGD 9/17 -> no active bleeding site found  - Frequent H/H (CBC q6), active type and screen ->give PRBC if Hgh droped below 7   - keep NPO  - c/w protonix 40mg BID   - CTA this morning.   - f/u with GI consult   - Patient has NG tube -> nursing notes that it is leaking -> will need to replaced, check if there is any bleeding when lavaged.     # Renal  - Creatinine 0.54, will keep monitor   - No active issues    # :  - Ucx (9/9) was positive for Pseudomonas and Klebsiella ESBL s/p meropenum x5day. Most recent Ucx neg.   - paraplegia: straight cath (patient has chronic urinary retention and is cathed by  at home) q4hr  - Miralax for chronic stool retention (does periodic manual disimpactions at home)    # ID:  - Ucx (9/9) was positive for Pseudomonas and Klebsiella ESBL s/p meropenum x5day -> dc today. Most recent Ucx (9/14) neg.  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 69 y/o F with PMHx of aortic dissection (post-repair several years prior), spinal cord hematoma (now functionally paraplegic), chronic spasticity and pain (on Oxycodone and Baclofen pump), HTN, nephrolithiasis s/p left urethral stent, UTIs and endotheliitis/keratitis (post-corneal transplant), with recent hospitalizations at [facility] for UGIB. s/p multiple PRBC transfusions and EGDs. CTA a/p  negative and unable to localize source of bleed.  Patient transferred to MICU for further management post EDG 9/17.    # Neuro:   - currently intubated -> possible extubation today with CPAP trial   - sedated on propofol, wean as tolerated  - baseline: A&Ox3  - chronic pain -> switch to medication through NG tube.  - Paraplegia 2/2 spinal hematoma   - Baclofen pump replace by 9/22 -> Vaishali Muniz NP from [facility] contacted [facility] who can replace the pump, waiting supply from Dr. Vieira's office, f/u with Vaishali/chronic pain team (extension:[phone], cell: [phone]-> off Thursday)     # CV:  - hx of aortic dissection -> post repair and appears stable on recent CTA performed at [place]  - HTN upon transfer w/ BP in 180/80s -> target -150, if >150, consider IVP of Lopressor     # Resp:  - Currently intubated -> possible extubation later today  - Vent setting: PEEP 5 FIO2 40%  RR 12     # GI:  - EGD 9/17 -> no active bleeding site found  - Frequent H/H (CBC q6), active type and screen ->give PRBC if Hgh droped below 7   - keep NPO  - c/w protonix 40mg BID   - CTA this morning.   - f/u with GI consult   - Patient has NG tube -> nursing notes that it is leaking -> will need to replaced, check if there is any bleeding when lavaged.     # Renal  - Creatinine 0.54, will keep monitor   - No active issues    # :  - Ucx (9/9) was positive for Pseudomonas and Klebsiella ESBL s/p meropenum x5day. Most recent Ucx neg.   - paraplegia: straight cath (patient has chronic urinary retention and is cathed by  at home) q4hr  - Miralax for chronic stool retention (does periodic manual disimpactions at home)    # ID:  - Ucx (9/9) was positive for Pseudomonas and Klebsiella ESBL s/p meropenum x5day -> dc today. Most recent Ucx (9/14) neg.  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 67 y/o F with PMHx of aortic dissection (post-repair several years prior), spinal cord hematoma (now functionally paraplegic), chronic spasticity and pain (on Oxycodone and Baclofen pump), HTN, nephrolithiasis s/p left urethral stent, UTIs and endotheliitis/keratitis (post-corneal transplant), with recent hospitalizations at [facility] for UGIB. s/p multiple PRBC transfusions and EGDs. CTA a/p  negative and unable to localize source of bleed.  Patient transferred to MICU for further management post EDG 9/17.    # Neuro:   - currently intubated -> possible extubation today with CPAP trial   - sedated on propofol, wean as tolerated  - baseline: A&Ox3  - chronic pain -> switch to medication through NG tube.  - Paraplegia 2/2 spinal hematoma   - Baclofen pump replace by 9/22 -> Vaishali Muniz NP from [facility] (extension:[phone], cell: [phone]-> off Thursday) contacted [employer] who can replace the pump, waiting supply from Dr. Vieira's office    # CV:  - hx of aortic dissection -> post repair and appears stable on recent CTA performed at [place]  - HTN upon transfer w/ BP in 180/80s -> target -150, if >150, consider IVP of Lopressor     # Resp:  - Currently intubated -> possible extubation later today  - Vent setting: PEEP 5 FIO2 40%  RR 12     # GI:  - EGD 9/17 -> no active bleeding site found  - Frequent H/H (CBC q6), active type and screen-> give PRBC if Hgh dropped below 7   - keep NPO  - c/w protonix 40mg BID   - CTA this morning.   - f/u with GI consult   - Patient has NG tube -> nursing notes that it is leaking -> will need to replaced, check if there is any bleeding when lavaged.     # Renal  - Creatinine 0.54, will keep monitor   - No active issues    # :  - Ucx (9/9) was positive for Pseudomonas and Klebsiella ESBL s/p meropenum x5day. Most recent Ucx neg.   - paraplegia: straight cath (patient has chronic urinary retention and is cathed by  at home) q4hr  - Miralax for chronic stool retention (does periodic manual disimpactions at home)    # ID:  - Ucx (9/9) was positive for Pseudomonas and Klebsiella ESBL s/p meropenum x5day -> dc today. Most recent Ucx (9/14) neg.  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 69 y/o F with PMHx of aortic dissection (post-repair several years prior), spinal cord hematoma (now functionally paraplegic), chronic spasticity and pain (on Oxycodone and Baclofen pump), HTN, nephrolithiasis s/p left urethral stent, UTIs and endotheliitis/keratitis (post-corneal transplant), with recent hospitalizations at [facility] for UGIB. s/p multiple PRBC transfusions and EGDs. CTA a/p  negative and unable to localize source of bleed.  Patient transferred to MICU for further management post EDG 9/17.    # Neuro:   - currently intubated -> possible extubation today with CPAP trial   - sedated on propofol, wean as tolerated  - baseline: A&Ox3  - chronic pain -> switch to medication through NG tube.  - Paraplegia 2/2 spinal hematoma   - Baclofen pump replace by 9/22 -> Vaishali Muniz NP from [facility] (extension:[phone], cell: [phone]-> off Thursday) contacted [facility] who can replace the pump, waiting supply from Dr. Vieira's office    # CV:  - hx of aortic dissection -> post repair and appears stable on recent CTA performed at [place]  - HTN upon transfer w/ BP in 180/80s -> target -150, if >150, consider IVP of Lopressor     # Resp:  - Currently intubated -> possible extubation later today  - Vent setting: PEEP 5 FIO2 40%  RR 12     # GI:  - EGD 9/17 -> no active bleeding site found  - Frequent H/H (CBC q6), active type and screen-> give PRBC if Hgh dropped below 7   - keep NPO  - c/w protonix 40mg BID   - CTA this morning.   - f/u with GI consult ( [phone])   - Patient has NG tube -> nursing notes that it is leaking -> will need to replaced, check if there is any bleeding when lavaged.     # Renal  - Creatinine 0.54, will keep monitor   - No active issues    # :  - Ucx (9/9) was positive for Pseudomonas and Klebsiella ESBL s/p meropenum x5day. Most recent Ucx neg.   - paraplegia: straight cath (patient has chronic urinary retention and is cathed by  at home) q4hr  - Miralax for chronic stool retention (does periodic manual disimpactions at home)    # ID:  - Ucx (9/9) was positive for Pseudomonas and Klebsiella ESBL s/p meropenum x5day -> dc today. Most recent Ucx (9/14) neg.  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 67 y/o F with PMHx of aortic dissection (post-repair several years prior), spinal cord hematoma (now functionally paraplegic), chronic spasticity and pain (on Oxycodone and Baclofen pump), HTN, nephrolithiasis s/p left urethral stent, UTIs and endotheliitis/keratitis (post-corneal transplant), with recent hospitalizations at [facility] for UGIB. s/p multiple PRBC transfusions and EGDs. CTA a/p  negative and unable to localize source of bleed.  Patient transferred to MICU for further management post EDG 9/17.    # Neuro:   - currently intubated -> possible extubation today with CPAP trial   - sedated on propofol, wean as tolerated  - baseline: A&Ox3  - chronic pain -> switch to medication through NG tube.  - Paraplegia 2/2 spinal hematoma   - Baclofen pump replace by 9/22 -> Vaishali Muniz NP from [facility] (extension:[phone], cell: [phone]-> off Thursday) contacted [facility] who can replace the pump, waiting supply from Dr. Vieira's office    # CV:  - hx of aortic dissection -> post repair and appears stable on recent CTA performed at [place]  - HTN upon transfer w/ BP in 180/80s -> target -150, if >150, consider IVP of Lopressor     # Resp:  - Currently intubated -> possible extubation later today  - Vent setting: PEEP 5 FIO2 40%  RR 12     # GI:  - CTA w/o no definite of bleeding    - EGD 9/17 -> no active bleeding site found  - Frequent H/H (CBC q6), active type and screen-> give PRBC if Hgh dropped below 7   - keep NPO  - c/w protonix 40mg BID   - - f/u with GI consult ( [phone]) -> updated results of CTA, says its okay to extubate following CTA, no provoked bleeding test for now   - Patient has NG tube -> nursing notes that it is leaking -> will need to replaced, check if there is any bleeding when lavaged.     # Renal  - Creatinine 0.54, will keep monitor   - No active issues    # :  - Ucx (9/9) was positive for Pseudomonas and Klebsiella ESBL s/p meropenum x5day. Most recent Ucx neg.   - paraplegia: straight cath (patient has chronic urinary retention and is cathed by  at home) q4hr  - Miralax for chronic stool retention (does periodic manual disimpactions at home)    # ID:  - Ucx (9/9) was positive for Pseudomonas and Klebsiella ESBL s/p meropenum x5day -> dc today. Most recent Ucx (9/14) neg.  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 67 y/o F with PMHx of aortic dissection (post-repair several years prior), spinal cord hematoma (now functionally paraplegic), chronic spasticity and pain (on Oxycodone and Baclofen pump), HTN, nephrolithiasis s/p left urethral stent, UTIs and endotheliitis/keratitis (post-corneal transplant), with recent hospitalizations at [facility] for UGIB. s/p multiple PRBC transfusions and EGDs. CTA a/p  negative and unable to localize source of bleed.  Patient transferred to MICU for further management post EDG 9/17.    # Neuro:   - currently intubated -> possible extubation today with CPAP trial   - sedated on propofol, wean as tolerated  - baseline: A&Ox3  - chronic pain -> switch to medication through NG tube.  - Paraplegia 2/2 spinal hematoma   - Baclofen pump replace by 9/22 -> Vaishali Muniz NP from [facility] (extension:[phone], cell: [phone]-> off Thursday) contacted [facility] who can replace the pump, waiting supply from Dr. Vieira's office    # CV:  - hx of aortic dissection -> post repair and appears stable on recent CTA performed at [place]  - HTN upon transfer w/ BP in 180/80s -> target -150, if >150, consider IVP of Lopressor     # Resp:  - Currently intubated -> possible extubation later today  - Vent setting: PEEP 5 FIO2 40%  RR 12     # GI:  - CTA w/o no definite of bleeding    - EGD 9/17 -> no active bleeding site found  - Frequent H/H (CBC q6), active type and screen-> give PRBC if Hgh dropped below 7   - keep NPO  - c/w protonix 40mg BID   - - f/u with GI consult ( [phone]) -> updated results of CTA, says its okay to extubate following CTA, no provoked bleeding test for now   - NG tube replaced 9/18, initially put out 80cc of coffee ground emesis. However, hb stable at ~10.     # Renal  - Creatinine 0.54, will keep monitor   - No active issues    # :  - Ucx (9/9) was positive for Pseudomonas and Klebsiella ESBL s/p meropenum x5day. Most recent Ucx neg.   - paraplegia: straight cath (patient has chronic urinary retention and is cathed by  at home) q4hr  - Miralax for chronic stool retention (does periodic manual disimpactions at home)    # ID:  - Ucx (9/9) was positive for Pseudomonas and Klebsiella ESBL s/p meropenum x5day -> dc today. Most recent Ucx (9/14) neg.  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

## 2019-09-18 NOTE — OCCUPATIONAL THERAPY INITIAL EVALUATION ADULT - PRECAUTIONS/LIMITATIONS, REHAB EVAL
continue:There is suspicion for dieulafoy, but unable to be acted upon at [place] with the multiple EGDs at times only showing adherent clot. She was evaluated by surgery who recommended ex-lap given the severity, but patient's family deferred for now. CT angiogram could not identify active source of bleeding with IR evaluation stating they could not provide a solution given the patient's previous dissection and collateral arterial formation. Pt s/p EGD at [facility], +gastritis s/p 2 clips, on PPI BID IV.; Pt transferred to MICU yesterday 9/17 s/p Endoscopy. Pt now intubated & sedated, continue: There is suspicion for dieulafoy, but unable to be acted upon at [place] with the multiple EGDs at times only showing adherent clot. She was evaluated by surgery who recommended ex-lap given the severity, but patient's family deferred for now. CT angiogram could not identify active source of bleeding with IR evaluation stating they could not provide a solution given the patient's previous dissection and collateral arterial formation. Pt s/p EGD at [facility], +gastritis s/p 2 clips, on PPI BID IV.; Pt transferred to MICU yesterday 9/17 s/p Endoscopy. Pt now intubated & sedated,

## 2019-09-18 NOTE — AIRWAY REMOVAL NOTE  ADULT & PEDS - ARTIFICAL AIRWAY REMOVAL COMMENTS
Written order for extubation verified. The patient was identified by full name and birth date compared to the identification band. Present during the procedure was  RT Mejia Valeria, RN.

## 2019-09-18 NOTE — PROGRESS NOTE ADULT - SUBJECTIVE AND OBJECTIVE BOX
Follow Up:  UTI    Interval History/ROS: intubated in MICU     Allergies  No Known Allergies    ANTIMICROBIALS:  valACYclovir 1000 three times a day    OTHER MEDS:  MEDICATIONS  (STANDING):  dexmedetomidine Infusion 0.05 <Continuous>  DULoxetine 30 two times a day  fentaNYL    Injectable 50 every 2 hours PRN  gabapentin   Solution 300 three times a day  influenza   Vaccine 0.5 once  ondansetron Injectable 4 every 6 hours PRN  pantoprazole  Injectable 40 every 12 hours  propofol Infusion 40 <Continuous>      Vital Signs Last 24 Hrs  T(C): 37.8 (18 Sep 2019 08:00), Max: 37.8 (18 Sep 2019 08:00)  T(F): 100 (18 Sep 2019 08:00), Max: 100 (18 Sep 2019 08:00)  HR: 96 (18 Sep 2019 10:00) (86 - 125)  BP: --  BP(mean): --  RR: 14 (18 Sep 2019 10:00) (12 - 30)  SpO2: 97% (18 Sep 2019 10:00) (97% - 100%)    PHYSICAL EXAM:  General: NAD, Non-toxic  Neurology: sedated on vent  Respiratory: Clear to auscultation bilaterally  CV: RRR, S1S2, no murmurs, rubs or gallops  Abdominal: Soft, Non-tender, non-distended,  Extremities: No edema,  Line Sites: Clear  Skin: No rash                        6.7    6.8   )-----------( 242      ( 18 Sep 2019 05:07 )             19.5       09-18    143  |  108  |  18  ----------------------------<  96  3.5   |  24  |  0.54    Ca    8.3<L>      18 Sep 2019 00:33  Phos  2.6     09-18  Mg     1.8     09-18    TPro  4.9<L>  /  Alb  2.3<L>  /  TBili  0.2  /  DBili  x   /  AST  17  /  ALT  11  /  AlkPhos  84  09-18    MICROBIOLOGY:  .Urine  09-14-19   <10,000 CFU/mL Normal Urogenital Zora  --  --    .Blood  09-14-19   No growth to date.  --  --      .Blood  09-12-19   No growth at 5 days.  --  --    .Urine  09-09-19   >100,000 CFU/ml Klebsiella pneumoniae ESBL  >100,000 CFU/ml Pseudomonas aeruginosa  --  Klebsiella pneumoniae ESBL  Pseudomonas aeruginosa    08-28-19   10,000 - 49,000 CFU/mL Presumptive Candida albicans  --  --      .Blood None  08-28-19   No growth at 5 days.  --  --      .Blood None  08-28-19   No growth at 5 days.  --  --    Rapid RVP Result: NotDetec (09-14 @ 09:04)    RADIOLOGY:  < from: Xray Chest 1 View- PORTABLE-Urgent (09.17.19 @ 21:03) >  FINDINGS AND   IMPRESSION:  Enteric tube with tip below the diaphragm in the stomach.  Endotracheal tube with tip in appropriate position above the jose de jesus.   Status post sternotomy and CABG. IVC filter in place. A spinal stimulator   overlies the thoracic spine. Ureteral stent overlying the left kidney.  The cardiomediastinal silhouette is unremarkable.  The lungs are clear. No evidence of pleural effusion or pneumothorax.  The visualized osseous and soft tissue structures demonstrate no acute   pathology.    < end of copied text >      Misael Guerrero MD; Division of Infectious Disease; Pager: [phone]; nights and weekends: [phone]

## 2019-09-18 NOTE — OCCUPATIONAL THERAPY INITIAL EVALUATION ADULT - PERTINENT HX OF CURRENT PROBLEM, REHAB EVAL
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

## 2019-09-18 NOTE — OCCUPATIONAL THERAPY INITIAL EVALUATION ADULT - ANTICIPATED DISCHARGE DISPOSITION, OT EVAL
home w/ OT home w/ OT/Home OT to address deficits, assess home safety, increase independence in ADLs and functional mobility.

## 2019-09-18 NOTE — CHART NOTE - NSCHARTNOTEFT_GEN_A_CORE
Nutrition Follow Up Note  Patient seen for: f/u, MICU adm.    Chart reviewed, events noted. Adm dx: UGIB. Pt transferred to MICU for further management post EGD , intubated.    Source: chart, team    Diet :  NPO    (pt had previously been on a regular diet, Benny 2 times daily)  per flow sheets when po intake was documented ate 50% 1 meal , 100% 1 meal 9/15, 100% 1 meal      Enteral /Parenteral Nutrition: 24 propofol intake  244 kcals    GI: no vomiting, had BM today      Daily Weight in k.3 () 122lb, no previous wt noted. Pt reported usual wt 110 lb on adm  no edema noted      Pertinent Medications: MEDICATIONS  (STANDING):  artificial tears (preservative free) Ophthalmic Solution 1 Drop(s) Left EYE every 2 hours  ascorbic acid 500 milliGRAM(s) Oral daily  atorvastatin 40 milliGRAM(s) Oral at bedtime  baclofen 20 milliGRAM(s) Oral four times a day  chlorhexidine 0.12% Liquid 15 milliLiter(s) Oral Mucosa every 12 hours  chlorhexidine 4% Liquid 1 Application(s) Topical <User Schedule>  dexmedetomidine Infusion 0.05 MICROgram(s)/kG/Hr (0.693 mL/Hr) IV Continuous <Continuous>  DULoxetine 30 milliGRAM(s) Oral two times a day  gabapentin   Solution 800 milliGRAM(s) Oral three times a day  influenza   Vaccine 0.5 milliLiter(s) IntraMuscular once  lidocaine   Patch 1 Patch Transdermal daily  lidocaine   Patch 1 Patch Transdermal daily  pantoprazole  Injectable 40 milliGRAM(s) IV Push every 12 hours  prednisoLONE acetate 1% Suspension 1 Drop(s) Left EYE four times a day  propofol Infusion 40 MICROgram(s)/kG/Min (13.296 mL/Hr) IV Continuous <Continuous>  valACYclovir 1000 milliGRAM(s) Oral three times a day    MEDICATIONS  (PRN):  fentaNYL    Injectable 50 MICROGram(s) IV Push every 2 hours PRN Moderate Pain (4 - 6)  ondansetron Injectable 4 milliGRAM(s) IV Push every 6 hours PRN Nausea and/or Vomiting     @ 00:33: Na 143, BUN 18, Cr 0.54, BG 96, K+ 3.5, Phos 2.6, Mg 1.8, Alk Phos 84, ALT/SGPT 11, AST/SGOT 17, HbA1c --   @ 15:47: Na 140, BUN 15, Cr 0.60, BG 91, K+ 4.0, Phos 2.5, Mg 1.6, Alk Phos 75, ALT/SGPT 11, AST/SGOT 18, HbA1c --    Finger Sticks:  POCT Blood Glucose.: 97 mg/dL ( @ 12:47)  POCT Blood Glucose.: 84 mg/dL ( @ 05:43)      Skin per nursing documentation: stage 2 sacrum      Estimated Needs:   [ ] no change since previous assessment  [x ] recalculated energy needs for intubation, based on UBW 50 kg 3642-1308 kcals (20-25 kcals/kg), 60-70gm protein (1.2-1.4 gm/kg)    Previous Nutrition Diagnosis: inadequate oral intake  Nutrition Diagnosis is: not applicable at this time, pt intubated    New Nutrition Diagnosis: none at this time  Related to:    As evidenced by:      Interventions:     Recommend  1) if pt remains intubated and EN initiated, recommend Vital 1.2 at 55 cc/hr x 18 hrs provides 1188 kcals, 74 gm protein, 803 cc free water  meets 24 Kcal/Kg, 1.5Gm protein/kg  UBW 50kg  2) Benny 2 packets daily  3) multivitamin     Monitoring and Evaluation:     Continue to monitor NPO status, weights, labs, skin integrity    RD remains available upon request and will follow up per protocol

## 2019-09-18 NOTE — PROGRESS NOTE ADULT - SUBJECTIVE AND OBJECTIVE BOX
68F with PMHx of aortic dissection repair, functional paraplegia 2/2 spinal cord hematoma, chronic spasticity and pain, Baclofen pump, Spinal cord stimulator, HTN, nephrolithiasis s/p left urethral stent and endotheliitis/keratitis (post-corneal transplant) transferred from [facility] after suffering UGIB requiring several PRBCs. Unable to identify source of bleed, Patient's PMD is Dr. Branham who recommended transfer to [facility] for evaluation by Dr. Ambrocio.    active, symptomatic GIB. EGD performed, unable to locate source. Pt transferred to MICU intubated.    Has a H/O dorsalgia, and has a [employer] intrathecal infusion device in place. Is under the care of community pain management specialist Dr Vieira in [place].  Per Dr. Vieira's office:  [employer] 40 cc intrathecal pump infusing -   Lioresal 2000 mcg/ ml at 480 mcg/ day  Morphine 10 mg/ ml at 2.4 mg/ day  Pump will alarm low reservoir volume on 2019.  At the time of alarm she will have 2 cc remaining in the pump reservoir    Multiple discussions held with MICU team, Dr. Vieira's office, Neurosurgery and [employer] to arrange for pump refill while in-patient. Alarm date is in 4 days, primary team does not anticipate discharge within the next week.     1. Received sealed medication container from Dr. Vieira's staff who hand delivered it. This is patient's medication which Dr. Vieira had been holding until pt arrived. Discussed with Pharmacy who approved use of the patient's own medication, they did not have to verify because it came in sealed container.  Container Label: Verified patient's name and   [facility]  Rx # [id number]  Date: 2019  Use by: 2019  PF Morphine 10 mg/ml; PF Baclofen 2,000 mcg/ml   In 20 ml of PF NS  Bar code # [id number]    2. [employer] Representative Therese Merino present; interrogated pump, monitored process, and reset pump after refill to existing settings (no changes made). Will provide documentation for patients medical record.    3. Appreciate Neurosurgery's partnership. Pump was refilled by Dr. Jesse Peña and resident    Pt was awake and oriented, responding appropriately by nodding/shaking head. Propofol was off, pt in preparation for extubation. Informed by MICU Fellow that pump was noted to be alarming today. Patient tolerated procedure well. No adverse events.

## 2019-09-18 NOTE — PROVIDER CONTACT NOTE (OTHER) - RECOMMENDATIONS
administer Tylenol and apply cooling [place] to pt and monitor.
Bolus
Pain meds and EKG.
To start on d5
administer tylenol
apply cool packs
EKG-- tylenol for fever

## 2019-09-18 NOTE — OCCUPATIONAL THERAPY INITIAL EVALUATION ADULT - LIVES WITH, PROFILE
Pt lives with spouse, PTA pt lives in pvt home, +ramp to enter, is nonambulatory, has [employer]HA (private pay) 9-1pm for ADLs, owns hospital bed, air mattress, W/C, as per pt's  Alexis pt usually transfer to W/C using sliding board with supervision, req assist to move BLE to EOB./spouse

## 2019-09-18 NOTE — PROGRESS NOTE ADULT - ASSESSMENT
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     Had solitary fever 100.5F 2/2 GIB vs ?UTI. UA+, UCx ESBL Klebs and pseudomonas  significance of bacteruria unclear  patient gives vague history  ureteral stent/nephrolitisis may be infected  leukocytosis resolved  bacteruria cleared on antibiotics  afebrile since 9/14 - low grade today  Endoscopy 9/17 demonstrated a large amount of blood but no clear bleeding source    ANTI-INFECTIVES  Valtrex 9/4-->  Ceftriaxone 9/9 -->13  Meropenem 9/13-->9/18    suggest  Completed meropenem: 5 day course today     discussed with MICU - CT angio to look for bleeding source and extubation anticipated

## 2019-09-18 NOTE — PROGRESS NOTE ADULT - SUBJECTIVE AND OBJECTIVE BOX
SUBJECTIVE / OVERNIGHT EVENTS:   Patient seen and examined at bedside. H/H dropped to 6.7/19.5 and pt was given 1u PRBCs overnight. Pt remains intubated.     MEDICATIONS  (STANDING):  artificial tears (preservative free) Ophthalmic Solution 1 Drop(s) Left EYE every 2 hours  chlorhexidine 0.12% Liquid 15 milliLiter(s) Oral Mucosa every 12 hours  chlorhexidine 4% Liquid 1 Application(s) Topical <User Schedule>  dexmedetomidine Infusion 0.05 MICROgram(s)/kG/Hr (0.693 mL/Hr) IV Continuous <Continuous>  DULoxetine 30 milliGRAM(s) Oral two times a day  gabapentin   Solution 300 milliGRAM(s) Oral three times a day  influenza   Vaccine 0.5 milliLiter(s) IntraMuscular once  lidocaine   Patch 1 Patch Transdermal daily  lidocaine   Patch 1 Patch Transdermal daily  meropenem  IVPB 1000 milliGRAM(s) IV Intermittent every 8 hours  pantoprazole  Injectable 40 milliGRAM(s) IV Push every 12 hours  prednisoLONE acetate 1% Suspension 1 Drop(s) Left EYE four times a day  propofol Infusion 40 MICROgram(s)/kG/Min (13.296 mL/Hr) IV Continuous <Continuous>    MEDICATIONS  (PRN):  fentaNYL    Injectable 50 MICROGram(s) IV Push every 2 hours PRN Moderate Pain (4 - 6)  ondansetron Injectable 4 milliGRAM(s) IV Push every 6 hours PRN Nausea and/or Vomiting      CAPILLARY BLOOD GLUCOSE      POCT Blood Glucose.: 84 mg/dL (18 Sep 2019 05:43)      I&O's Summary    17 Sep 2019 07:01  -  18 Sep 2019 07:00  --------------------------------------------------------  IN: 322.3 mL / OUT: 890 mL / NET: -567.7 mL        T(C): 36.9 (09-18-19 @ 04:00), Max: 37 (09-18-19 @ 00:00)  HR: 110 (09-18-19 @ 07:00) (86 - 127)  BP: 100/66 (09-17-19 @ 07:30) (100/66 - 100/66)  RR: 16 (09-18-19 @ 07:00) (12 - 30)  SpO2: 97% (09-18-19 @ 07:00) (97% - 100%)    Mode: AC/ CMV (Assist Control/ Continuous Mandatory Ventilation)  RR (machine): 12  TV (machine): 400  FiO2: 40  PEEP: 5  ITime: 1  MAP: 8  PIP: 21      PHYSICAL EXAM:  GENERAL: intubated and sedated, lying in hospital bed   HEENT:  + L corneal transplant, unable to assess pupillary reaction in both eyes  Neck: supple   PULM: CTAB, no crackles, no wheezing   CV: +S1/S2, RRR, no murmurs appreciated   ABDOMEN: soft, nondistended, no tenderness to palpation, baclofen pump on RLQ   EXTREMITIES: mild peripheral edema   PSYCH: unable to assess  NEUROLOGY: Pt intubated and sedated, motor/sensory unable to assess   SKIN: no new rashes or ulcers noted     LABS:                        6.7    6.8   )-----------( 242      ( 18 Sep 2019 05:07 )             19.5     WBC Trend: 6.8<--, 11.7<--, 10.6<--  09-18    143  |  108  |  18  ----------------------------<  96  3.5   |  24  |  0.54    Ca    8.3<L>      18 Sep 2019 00:33  Phos  2.6     09-18  Mg     1.8     09-18    TPro  4.9<L>  /  Alb  2.3<L>  /  TBili  0.2  /  DBili  x   /  AST  17  /  ALT  11  /  AlkPhos  84  09-18    Creatinine Trend: 0.54<--, 0.60<--, 0.65<--, 0.39<--, 0.60<--, 0.60<--  PT/INR - ( 18 Sep 2019 00:33 )   PT: 12.4 sec;   INR: 1.08 ratio         PTT - ( 18 Sep 2019 00:33 )  PTT:32.4 sec  CARDIAC MARKERS ( 18 Sep 2019 05:05 )  x     / x     / 13 U/L / x     / x      CARDIAC MARKERS ( 18 Sep 2019 00:33 )  x     / x     / 19 U/L / x     / 2.3 ng/mL  CARDIAC MARKERS ( 17 Sep 2019 21:43 )  x     / x     / 23 U/L / x     / 3.0 ng/mL  CARDIAC MARKERS ( 17 Sep 2019 15:47 )  x     / x     / 30 U/L / x     / 3.3 ng/mL      RADIOLOGY & ADDITIONAL TESTS:    Imaging Personally Reviewed:    Consultant(s) Notes Reviewed:      Care Discussed with Consultants/Other Providers: SUBJECTIVE / OVERNIGHT EVENTS:   Patient seen and examined at bedside. H/H dropped to 6.7/19.5 and pt pending 1U PRBC. Pt remains intubated.     MEDICATIONS  (STANDING):  artificial tears (preservative free) Ophthalmic Solution 1 Drop(s) Left EYE every 2 hours  chlorhexidine 0.12% Liquid 15 milliLiter(s) Oral Mucosa every 12 hours  chlorhexidine 4% Liquid 1 Application(s) Topical <User Schedule>  dexmedetomidine Infusion 0.05 MICROgram(s)/kG/Hr (0.693 mL/Hr) IV Continuous <Continuous>  DULoxetine 30 milliGRAM(s) Oral two times a day  gabapentin   Solution 300 milliGRAM(s) Oral three times a day  influenza   Vaccine 0.5 milliLiter(s) IntraMuscular once  lidocaine   Patch 1 Patch Transdermal daily  lidocaine   Patch 1 Patch Transdermal daily  meropenem  IVPB 1000 milliGRAM(s) IV Intermittent every 8 hours  pantoprazole  Injectable 40 milliGRAM(s) IV Push every 12 hours  prednisoLONE acetate 1% Suspension 1 Drop(s) Left EYE four times a day  propofol Infusion 40 MICROgram(s)/kG/Min (13.296 mL/Hr) IV Continuous <Continuous>    MEDICATIONS  (PRN):  fentaNYL    Injectable 50 MICROGram(s) IV Push every 2 hours PRN Moderate Pain (4 - 6)  ondansetron Injectable 4 milliGRAM(s) IV Push every 6 hours PRN Nausea and/or Vomiting      CAPILLARY BLOOD GLUCOSE      POCT Blood Glucose.: 84 mg/dL (18 Sep 2019 05:43)      I&O's Summary    17 Sep 2019 07:01  -  18 Sep 2019 07:00  --------------------------------------------------------  IN: 322.3 mL / OUT: 890 mL / NET: -567.7 mL        T(C): 36.9 (09-18-19 @ 04:00), Max: 37 (09-18-19 @ 00:00)  HR: 110 (09-18-19 @ 07:00) (86 - 127)  BP: 100/66 (09-17-19 @ 07:30) (100/66 - 100/66)  RR: 16 (09-18-19 @ 07:00) (12 - 30)  SpO2: 97% (09-18-19 @ 07:00) (97% - 100%)    Mode: AC/ CMV (Assist Control/ Continuous Mandatory Ventilation)  RR (machine): 12  TV (machine): 400  FiO2: 40  PEEP: 5  ITime: 1  MAP: 8  PIP: 21      PHYSICAL EXAM:  GENERAL: intubated and sedated, lying in hospital bed   HEENT:  + L corneal transplant, unable to assess pupillary reaction in both eyes  Neck: supple   PULM: CTAB, no crackles, no wheezing   CV: +S1/S2, RRR, no murmurs appreciated   ABDOMEN: soft, nondistended, no tenderness to palpation, baclofen pump on RLQ   EXTREMITIES: mild peripheral edema   PSYCH: unable to assess  NEUROLOGY: Pt intubated and sedated, motor/sensory unable to assess   SKIN: no new rashes or ulcers noted     LABS:                        6.7    6.8   )-----------( 242      ( 18 Sep 2019 05:07 )             19.5     WBC Trend: 6.8<--, 11.7<--, 10.6<--  09-18    143  |  108  |  18  ----------------------------<  96  3.5   |  24  |  0.54    Ca    8.3<L>      18 Sep 2019 00:33  Phos  2.6     09-18  Mg     1.8     09-18    TPro  4.9<L>  /  Alb  2.3<L>  /  TBili  0.2  /  DBili  x   /  AST  17  /  ALT  11  /  AlkPhos  84  09-18    Creatinine Trend: 0.54<--, 0.60<--, 0.65<--, 0.39<--, 0.60<--, 0.60<--  PT/INR - ( 18 Sep 2019 00:33 )   PT: 12.4 sec;   INR: 1.08 ratio         PTT - ( 18 Sep 2019 00:33 )  PTT:32.4 sec  CARDIAC MARKERS ( 18 Sep 2019 05:05 )  x     / x     / 13 U/L / x     / x      CARDIAC MARKERS ( 18 Sep 2019 00:33 )  x     / x     / 19 U/L / x     / 2.3 ng/mL  CARDIAC MARKERS ( 17 Sep 2019 21:43 )  x     / x     / 23 U/L / x     / 3.0 ng/mL  CARDIAC MARKERS ( 17 Sep 2019 15:47 )  x     / x     / 30 U/L / x     / 3.3 ng/mL      RADIOLOGY & ADDITIONAL TESTS:    Imaging Personally Reviewed:    Consultant(s) Notes Reviewed:      Care Discussed with Consultants/Other Providers: SUBJECTIVE / OVERNIGHT EVENTS:   Patient seen and examined at bedside. H/H dropped to 6.7/19.5 and pt pending 1U PRBC. Pt remains intubated.     MEDICATIONS  (STANDING):  artificial tears (preservative free) Ophthalmic Solution 1 Drop(s) Left EYE every 2 hours  chlorhexidine 0.12% Liquid 15 milliLiter(s) Oral Mucosa every 12 hours  chlorhexidine 4% Liquid 1 Application(s) Topical <User Schedule>  dexmedetomidine Infusion 0.05 MICROgram(s)/kG/Hr (0.693 mL/Hr) IV Continuous <Continuous>  DULoxetine 30 milliGRAM(s) Oral two times a day  gabapentin   Solution 300 milliGRAM(s) Oral three times a day  influenza   Vaccine 0.5 milliLiter(s) IntraMuscular once  lidocaine   Patch 1 Patch Transdermal daily  lidocaine   Patch 1 Patch Transdermal daily  meropenem  IVPB 1000 milliGRAM(s) IV Intermittent every 8 hours  pantoprazole  Injectable 40 milliGRAM(s) IV Push every 12 hours  prednisoLONE acetate 1% Suspension 1 Drop(s) Left EYE four times a day  propofol Infusion 40 MICROgram(s)/kG/Min (13.296 mL/Hr) IV Continuous <Continuous>    MEDICATIONS  (PRN):  fentaNYL    Injectable 50 MICROGram(s) IV Push every 2 hours PRN Moderate Pain (4 - 6)  ondansetron Injectable 4 milliGRAM(s) IV Push every 6 hours PRN Nausea and/or Vomiting      CAPILLARY BLOOD GLUCOSE      POCT Blood Glucose.: 84 mg/dL (18 Sep 2019 05:43)      I&O's Summary    17 Sep 2019 07:01  -  18 Sep 2019 07:00  --------------------------------------------------------  IN: 322.3 mL / OUT: 890 mL / NET: -567.7 mL        T(C): 36.9 (09-18-19 @ 04:00), Max: 37 (09-18-19 @ 00:00)  HR: 110 (09-18-19 @ 07:00) (86 - 127)  BP: 100/66 (09-17-19 @ 07:30) (100/66 - 100/66)  RR: 16 (09-18-19 @ 07:00) (12 - 30)  SpO2: 97% (09-18-19 @ 07:00) (97% - 100%)    Mode: AC/ CMV (Assist Control/ Continuous Mandatory Ventilation)  RR (machine): 12  TV (machine): 400  FiO2: 40  PEEP: 5  ITime: 1  MAP: 8  PIP: 21    PHYSICAL EXAM:  GENERAL: intubated and sedated, lying in hospital bed,  HEENT:  + L corneal transplant, right pupil reactive to light   Neck: supple   PULM: CTAB, no crackles, no wheezing   CV: +S1/S2, RRR, no murmurs appreciated   ABDOMEN: soft, nondistended, no tenderness to palpation, baclofen pump on RLQ   EXTREMITIES: mild peripheral edema   PSYCH: unable to assess  NEUROLOGY: Pt intubated and sedated,  able to nod or shake her head to questions, paraplegic at baseline    SKIN: no new rashes or ulcers noted     LABS:                        6.7    6.8   )-----------( 242      ( 18 Sep 2019 05:07 )             19.5     WBC Trend: 6.8<--, 11.7<--, 10.6<--  09-18    143  |  108  |  18  ----------------------------<  96  3.5   |  24  |  0.54    Ca    8.3<L>      18 Sep 2019 00:33  Phos  2.6     09-18  Mg     1.8     09-18    TPro  4.9<L>  /  Alb  2.3<L>  /  TBili  0.2  /  DBili  x   /  AST  17  /  ALT  11  /  AlkPhos  84  09-18    Creatinine Trend: 0.54<--, 0.60<--, 0.65<--, 0.39<--, 0.60<--, 0.60<--  PT/INR - ( 18 Sep 2019 00:33 )   PT: 12.4 sec;   INR: 1.08 ratio         PTT - ( 18 Sep 2019 00:33 )  PTT:32.4 sec  CARDIAC MARKERS ( 18 Sep 2019 05:05 )  x     / x     / 13 U/L / x     / x      CARDIAC MARKERS ( 18 Sep 2019 00:33 )  x     / x     / 19 U/L / x     / 2.3 ng/mL  CARDIAC MARKERS ( 17 Sep 2019 21:43 )  x     / x     / 23 U/L / x     / 3.0 ng/mL  CARDIAC MARKERS ( 17 Sep 2019 15:47 )  x     / x     / 30 U/L / x     / 3.3 ng/mL    RADIOLOGY & ADDITIONAL TESTS:    < from: Upper Endoscopy (09.17.19 @ 09:28) >  Impression:          - Normal esophagus.                       - Hematin (altered blood/coffee-ground-like material) in the cardia, in the                        gastric fundus and in the gastric body.                       - Blood in the entire examined duodenum.                       - No specimens collected.                       AFTER CLOSE TO 3 HOURS OF PERFORMING THE ENDOSCOPY, AN ACTIVE BLEEDING SITE                        WAS NOT FOUND. BLOOD DID INTERFERE WITH OPTIMAL VISUALIZATION. PRIOR 2                        ENDOSCOPIES HAVE BEE UNREMARKABLE. THEREFORE, ABORTED THE PROCEDURE.  Recommendation:      - Admit the patient to ICU (DISCUSSED ABOVE AND HISTORY WITH ICU TEAM)                       - Monitor with frequent H/H, keep NPO                       - If sign of bleeding, consider CT angio and IR intervention.    < end of copied text > SUBJECTIVE / OVERNIGHT EVENTS:   Patient seen and examined at bedside. H/H dropped to 6.7/19.5 and received 1U PRBC. Pt remains intubated, pending CTA today.     MEDICATIONS  (STANDING):  artificial tears (preservative free) Ophthalmic Solution 1 Drop(s) Left EYE every 2 hours  chlorhexidine 0.12% Liquid 15 milliLiter(s) Oral Mucosa every 12 hours  chlorhexidine 4% Liquid 1 Application(s) Topical <User Schedule>  dexmedetomidine Infusion 0.05 MICROgram(s)/kG/Hr (0.693 mL/Hr) IV Continuous <Continuous>  DULoxetine 30 milliGRAM(s) Oral two times a day  gabapentin   Solution 300 milliGRAM(s) Oral three times a day  influenza   Vaccine 0.5 milliLiter(s) IntraMuscular once  lidocaine   Patch 1 Patch Transdermal daily  lidocaine   Patch 1 Patch Transdermal daily  meropenem  IVPB 1000 milliGRAM(s) IV Intermittent every 8 hours  pantoprazole  Injectable 40 milliGRAM(s) IV Push every 12 hours  prednisoLONE acetate 1% Suspension 1 Drop(s) Left EYE four times a day  propofol Infusion 40 MICROgram(s)/kG/Min (13.296 mL/Hr) IV Continuous <Continuous>    MEDICATIONS  (PRN):  fentaNYL    Injectable 50 MICROGram(s) IV Push every 2 hours PRN Moderate Pain (4 - 6)  ondansetron Injectable 4 milliGRAM(s) IV Push every 6 hours PRN Nausea and/or Vomiting      CAPILLARY BLOOD GLUCOSE      POCT Blood Glucose.: 84 mg/dL (18 Sep 2019 05:43)      I&O's Summary    17 Sep 2019 07:01  -  18 Sep 2019 07:00  --------------------------------------------------------  IN: 322.3 mL / OUT: 890 mL / NET: -567.7 mL        T(C): 36.9 (09-18-19 @ 04:00), Max: 37 (09-18-19 @ 00:00)  HR: 110 (09-18-19 @ 07:00) (86 - 127)  BP: 100/66 (09-17-19 @ 07:30) (100/66 - 100/66)  RR: 16 (09-18-19 @ 07:00) (12 - 30)  SpO2: 97% (09-18-19 @ 07:00) (97% - 100%)    Mode: AC/ CMV (Assist Control/ Continuous Mandatory Ventilation)  RR (machine): 12  TV (machine): 400  FiO2: 40  PEEP: 5  ITime: 1  MAP: 8  PIP: 21    PHYSICAL EXAM:  GENERAL: intubated and sedated, lying in hospital bed,  HEENT:  + L corneal transplant, right pupil reactive to light   Neck: supple   PULM: CTAB, no crackles, no wheezing   CV: +S1/S2, RRR, no murmurs appreciated   ABDOMEN: soft, nondistended, no tenderness to palpation, baclofen pump on RLQ   EXTREMITIES: mild peripheral edema   PSYCH: unable to assess  NEUROLOGY: Pt intubated and sedated,  able to nod or shake her head to questions, paraplegic at baseline    SKIN: no new rashes or ulcers noted     LABS:                        6.7    6.8   )-----------( 242      ( 18 Sep 2019 05:07 )             19.5     WBC Trend: 6.8<--, 11.7<--, 10.6<--  09-18    143  |  108  |  18  ----------------------------<  96  3.5   |  24  |  0.54    Ca    8.3<L>      18 Sep 2019 00:33  Phos  2.6     09-18  Mg     1.8     09-18    TPro  4.9<L>  /  Alb  2.3<L>  /  TBili  0.2  /  DBili  x   /  AST  17  /  ALT  11  /  AlkPhos  84  09-18    Creatinine Trend: 0.54<--, 0.60<--, 0.65<--, 0.39<--, 0.60<--, 0.60<--  PT/INR - ( 18 Sep 2019 00:33 )   PT: 12.4 sec;   INR: 1.08 ratio         PTT - ( 18 Sep 2019 00:33 )  PTT:32.4 sec  CARDIAC MARKERS ( 18 Sep 2019 05:05 )  x     / x     / 13 U/L / x     / x      CARDIAC MARKERS ( 18 Sep 2019 00:33 )  x     / x     / 19 U/L / x     / 2.3 ng/mL  CARDIAC MARKERS ( 17 Sep 2019 21:43 )  x     / x     / 23 U/L / x     / 3.0 ng/mL  CARDIAC MARKERS ( 17 Sep 2019 15:47 )  x     / x     / 30 U/L / x     / 3.3 ng/mL    RADIOLOGY & ADDITIONAL TESTS:    < from: Upper Endoscopy (09.17.19 @ 09:28) >  Impression:          - Normal esophagus.                       - Hematin (altered blood/coffee-ground-like material) in the cardia, in the                        gastric fundus and in the gastric body.                       - Blood in the entire examined duodenum.                       - No specimens collected.                       AFTER CLOSE TO 3 HOURS OF PERFORMING THE ENDOSCOPY, AN ACTIVE BLEEDING SITE                        WAS NOT FOUND. BLOOD DID INTERFERE WITH OPTIMAL VISUALIZATION. PRIOR 2                        ENDOSCOPIES HAVE BEE UNREMARKABLE. THEREFORE, ABORTED THE PROCEDURE.  Recommendation:      - Admit the patient to ICU (DISCUSSED ABOVE AND HISTORY WITH ICU TEAM)                       - Monitor with frequent H/H, keep NPO                       - If sign of bleeding, consider CT angio and IR intervention.    < end of copied text > SUBJECTIVE / OVERNIGHT EVENTS:   Patient seen and examined at bedside. H/H dropped to 6.7/19.5 and received 1U PRBC. Pt remains intubated, pending CTA today.     MEDICATIONS  (STANDING):  artificial tears (preservative free) Ophthalmic Solution 1 Drop(s) Left EYE every 2 hours  chlorhexidine 0.12% Liquid 15 milliLiter(s) Oral Mucosa every 12 hours  chlorhexidine 4% Liquid 1 Application(s) Topical <User Schedule>  dexmedetomidine Infusion 0.05 MICROgram(s)/kG/Hr (0.693 mL/Hr) IV Continuous <Continuous>  DULoxetine 30 milliGRAM(s) Oral two times a day  gabapentin   Solution 300 milliGRAM(s) Oral three times a day  influenza   Vaccine 0.5 milliLiter(s) IntraMuscular once  lidocaine   Patch 1 Patch Transdermal daily  lidocaine   Patch 1 Patch Transdermal daily  meropenem  IVPB 1000 milliGRAM(s) IV Intermittent every 8 hours  pantoprazole  Injectable 40 milliGRAM(s) IV Push every 12 hours  prednisoLONE acetate 1% Suspension 1 Drop(s) Left EYE four times a day  propofol Infusion 40 MICROgram(s)/kG/Min (13.296 mL/Hr) IV Continuous <Continuous>    MEDICATIONS  (PRN):  fentaNYL    Injectable 50 MICROGram(s) IV Push every 2 hours PRN Moderate Pain (4 - 6)  ondansetron Injectable 4 milliGRAM(s) IV Push every 6 hours PRN Nausea and/or Vomiting      CAPILLARY BLOOD GLUCOSE      POCT Blood Glucose.: 84 mg/dL (18 Sep 2019 05:43)      I&O's Summary    17 Sep 2019 07:01  -  18 Sep 2019 07:00  --------------------------------------------------------  IN: 322.3 mL / OUT: 890 mL / NET: -567.7 mL    T(C): 36.9 (09-18-19 @ 04:00), Max: 37 (09-18-19 @ 00:00)  HR: 110 (09-18-19 @ 07:00) (86 - 127)  BP: 100/66 (09-17-19 @ 07:30) (100/66 - 100/66)  RR: 16 (09-18-19 @ 07:00) (12 - 30)  SpO2: 97% (09-18-19 @ 07:00) (97% - 100%)    Mode: AC/ CMV (Assist Control/ Continuous Mandatory Ventilation)  RR (machine): 12  TV (machine): 400  FiO2: 40  PEEP: 5  ITime: 1  MAP: 8  PIP: 21    PHYSICAL EXAM:  GENERAL: intubated and sedated, lying in hospital bed,  HEENT:  + L corneal transplant, right pupil reactive to light   Neck: supple   PULM: CTAB, no crackles, no wheezing   CV: +S1/S2, RRR, no murmurs appreciated   ABDOMEN: soft, nondistended, no tenderness to palpation, baclofen pump on RLQ   EXTREMITIES: mild peripheral edema   PSYCH: unable to assess  NEUROLOGY: Pt intubated and sedated,  able to nod or shake her head to questions, paraplegic at baseline    SKIN: no new rashes or ulcers noted     LABS:                        6.7    6.8   )-----------( 242      ( 18 Sep 2019 05:07 )             19.5     WBC Trend: 6.8<--, 11.7<--, 10.6<--  09-18    143  |  108  |  18  ----------------------------<  96  3.5   |  24  |  0.54    Ca    8.3<L>      18 Sep 2019 00:33  Phos  2.6     09-18  Mg     1.8     09-18    TPro  4.9<L>  /  Alb  2.3<L>  /  TBili  0.2  /  DBili  x   /  AST  17  /  ALT  11  /  AlkPhos  84  09-18    Creatinine Trend: 0.54<--, 0.60<--, 0.65<--, 0.39<--, 0.60<--, 0.60<--  PT/INR - ( 18 Sep 2019 00:33 )   PT: 12.4 sec;   INR: 1.08 ratio         PTT - ( 18 Sep 2019 00:33 )  PTT:32.4 sec  CARDIAC MARKERS ( 18 Sep 2019 05:05 )  x     / x     / 13 U/L / x     / x      CARDIAC MARKERS ( 18 Sep 2019 00:33 )  x     / x     / 19 U/L / x     / 2.3 ng/mL  CARDIAC MARKERS ( 17 Sep 2019 21:43 )  x     / x     / 23 U/L / x     / 3.0 ng/mL  CARDIAC MARKERS ( 17 Sep 2019 15:47 )  x     / x     / 30 U/L / x     / 3.3 ng/mL    RADIOLOGY & ADDITIONAL TESTS:    < from: Upper Endoscopy (09.17.19 @ 09:28) >  Impression:          - Normal esophagus.                       - Hematin (altered blood/coffee-ground-like material) in the cardia, in the                        gastric fundus and in the gastric body.                       - Blood in the entire examined duodenum.                       - No specimens collected.                       AFTER CLOSE TO 3 HOURS OF PERFORMING THE ENDOSCOPY, AN ACTIVE BLEEDING SITE                        WAS NOT FOUND. BLOOD DID INTERFERE WITH OPTIMAL VISUALIZATION. PRIOR 2                        ENDOSCOPIES HAVE BEE UNREMARKABLE. THEREFORE, ABORTED THE PROCEDURE.  Recommendation:      - Admit the patient to ICU (DISCUSSED ABOVE AND HISTORY WITH ICU TEAM)                       - Monitor with frequent H/H, keep NPO                       - If sign of bleeding, consider CT angio and IR intervention.    < end of copied text >

## 2019-09-18 NOTE — PROGRESS NOTE ADULT - ASSESSMENT
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 Unable to identify source of bleed, Patient's PMD is Dr. Branham who recommended transfer to [facility] for evaluation by Dr. Ambrocio.   9/17 active, symptomatic GIB. EGD performed, unable to locate source. Pt transferred to MICU intubated.    Has a H/O dorsalgia and chronic spasticity, and has a [employer] intrathecal infusion device in place. Is under the care of community pain management specialist Dr Vieira in [place].    Pump refilled today by Neurosurgery, [employer] rep present.   Will notify Dr. Vieira that pump was essentially empty. Plan to be refilled before 12/1/2019.    [facility]  [phone]

## 2019-09-18 NOTE — PROGRESS NOTE ADULT - SUBJECTIVE AND OBJECTIVE BOX
- Low reservoir alarm occurred.  - Interrogated pump- confirms pump has emptied.  - Discussed with IM - they are getting pain management to refill pump.  - If she has significant rebound spasticity would start tizanidine 2mg TID and titrate up in addition to her oral baclofen.  - Patient currently on vent and sedated.

## 2019-09-19 NOTE — PROGRESS NOTE ADULT - SUBJECTIVE AND OBJECTIVE BOX
Follow Up:  Kelbs UTI    Interval History/ROS: sitting up in bed, eating jello     Allergies  No Known Allergies    ANTIMICROBIALS:  valACYclovir 1000 three times a day    OTHER MEDS:  MEDICATIONS  (STANDING):  atorvastatin 40 at bedtime  DULoxetine 30 two times a day  gabapentin   Solution 800 three times a day  labetalol 200 two times a day  ondansetron Injectable 4 every 6 hours PRN  oxyCODONE    IR 10 three times a day PRN  pantoprazole  Injectable 40 every 12 hours  zolpidem 5 at bedtime PRN      Vital Signs Last 24 Hrs  T(C): 36.5 (19 Sep 2019 13:10), Max: 37.6 (18 Sep 2019 16:00)  T(F): 97.7 (19 Sep 2019 13:10), Max: 99.6 (18 Sep 2019 16:00)  HR: 72 (19 Sep 2019 13:10) (72 - 101)  BP: 130/65 (19 Sep 2019 13:10) (114/61 - 133/61)  BP(mean): 88 (19 Sep 2019 12:00) (80 - 91)  RR: 19 (19 Sep 2019 13:10) (9 - 30)  SpO2: 97% (19 Sep 2019 13:10) (95% - 100%)    PHYSICAL EXAM:  General:  NAD, Non-toxic  Neurology: Alert  Respiratory: Clear to auscultation bilaterally  CV: RRR, S1S2, no murmurs, rubs or gallops  Abdominal: Soft, Non-tender, non-distended  Line Sites: Clear  Skin: No rash                        9.1    10.4  )-----------( 268      ( 19 Sep 2019 05:46 )             28.6  WBC Count: 10.4 (09-19 @ 05:46)  WBC Count: 11.5 (09-19 @ 00:10)  WBC Count: 11.1 (09-18 @ 18:20)  WBC Count: 10.3 (09-18 @ 13:37)  WBC Count: 6.8 (09-18 @ 05:07)  WBC Count: 11.7 (09-17 @ 21:43)  WBC Count: 10.6 (09-17 @ 15:47)  WBC Count: 17.7 (09-17 @ 05:10)  WBC Count: 12.1 (09-16 @ 19:24)  WBC Count: 8.62 (09-16 @ 09:42)  WBC Count: 13.38 (09-15 @ 09:39)    09-19    145  |  110<H>  |  20  ----------------------------<  103<H>  3.6   |  24  |  0.40<L>    Ca    8.7      19 Sep 2019 00:10  Phos  3.3     09-19  Mg     2.0     09-19    TPro  4.8<L>  /  Alb  2.3<L>  /  TBili  0.3  /  DBili  x   /  AST  12  /  ALT  7<L>  /  AlkPhos  77  09-19    MICROBIOLOGY:  .Urine  09-14-19   <10,000 CFU/mL Normal Urogenital Zora  --  --      .Blood  09-14-19   No growth at 5 days.  --  --      .Blood  09-12-19   No growth at 5 days.  --  --      .Urine  09-09-19   >100,000 CFU/ml Klebsiella pneumoniae ESBL  >100,000 CFU/ml Pseudomonas aeruginosa  --  Klebsiella pneumoniae ESBL  Pseudomonas aeruginosa    .Urine Clean Catch (Midstream)  08-28-19   10,000 - 49,000 CFU/mL Presumptive Candida albicans  --  --    .Blood None  08-28-19   No growth at 5 days.  --  --      .Blood None  08-28-19   No growth at 5 days.  --  --    Rapid RVP Result: NotDetec (09-14 @ 09:04)    RADIOLOGY:  < from: CT Angio Chest w/ IV Cont (09.18.19 @ 11:57) >  FINDINGS:    LINES AND TUBES: Endotracheal tube tip 4 cm above the jose de jesus. Enteric   tube tip within the stomach. A spinal stimulation device is present. IVC   filter.    LUNGS AND AIRWAYS: Patent central airways. Mucoid impactions within the   posterior segmental branch of left lower lobe bronchus.    Stable centrilobular nodules within the posterior upper lobes and basilar   lower lobes likely sequela of remote aspiration. No new consolidation or   opacity.    PLEURA: Trace bilateral pleural effusions.    MEDIASTINUM AND NAWAF: No lymphadenopathy.     VESSELS: No extravasation of contrast. Unchanged appearance of an   ascending and descending thoracic and abdominalaortic aneurysm status   post repair. The dissection flap extends into the right common carotid   and right common iliac arteries. No intramural hematoma. Unchanged   infrarenal abdominal aortic aneurysm. 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 There is hypertrophy and tortuosity of the branches of the   celiac artery. Multiple collaterals.    The left renal artery origin at the site of the dissection flap.   Hypoperfusion of the left kidney.    HEART: Heart size is normal. No pericardialeffusion.    CHEST WALL AND LOWER NECK: Status post median sternotomy.     VISUALIZED UPPER ABDOMEN: An enteric tube terminates in the stomach. No   retroperitoneal hematoma. Atrophic left kidney with decreased   enhancement. Partially imaged left nephroureteral stent with the pigtail   terminating in the left renal pelvis. Nonobstructive left renal calculus.   No hydroureteronephrosis. A baclofen pump overlies the spine.    BONES: Status post multilevel thoracic laminectomies.    IMPRESSION:     No definite extravasation    Severe stenosis of celiac ostium. Hypertrophy of the distal branches of   celiac trunk and multiple collaterals are again noted.    < end of copied text >      Misael Guerrero MD; Division of Infectious Disease; Pager: [phone]; nights and weekends: [phone]

## 2019-09-19 NOTE — PROGRESS NOTE ADULT - SUBJECTIVE AND OBJECTIVE BOX
INTERVAL HPI/OVERNIGHT EVENTS:    Events noted. CTA negative.  Transferred back to the floor. No complains.  NO abdominal pain    MEDICATIONS  (STANDING):  artificial tears (preservative free) Ophthalmic Solution 1 Drop(s) Left EYE every 2 hours  ascorbic acid 500 milliGRAM(s) Oral daily  atorvastatin 40 milliGRAM(s) Oral at bedtime  chlorhexidine 4% Liquid 1 Application(s) Topical <User Schedule>  DULoxetine 30 milliGRAM(s) Oral two times a day  gabapentin   Solution 800 milliGRAM(s) Oral three times a day  labetalol 200 milliGRAM(s) Oral two times a day  lidocaine   Patch 1 Patch Transdermal daily  pantoprazole  Injectable 40 milliGRAM(s) IV Push every 12 hours  prednisoLONE acetate 1% Suspension 1 Drop(s) Left EYE four times a day  valACYclovir 1000 milliGRAM(s) Oral three times a day    MEDICATIONS  (PRN):  ondansetron Injectable 4 milliGRAM(s) IV Push every 6 hours PRN Nausea and/or Vomiting  oxyCODONE    IR 10 milliGRAM(s) Oral three times a day PRN Moderate Pain (4 - 6)  zolpidem 5 milliGRAM(s) Oral at bedtime PRN Insomnia      Allergies    No Known Allergies    Intolerances    Vital Signs Last 24 Hrs  T(C): 36.5 (19 Sep 2019 13:10), Max: 37 (18 Sep 2019 20:00)  T(F): 97.7 (19 Sep 2019 13:10), Max: 98.6 (18 Sep 2019 20:00)  HR: 72 (19 Sep 2019 13:10) (72 - 101)  BP: 130/65 (19 Sep 2019 13:10) (114/61 - 133/61)  BP(mean): 88 (19 Sep 2019 12:00) (80 - 91)  RR: 19 (19 Sep 2019 13:10) (9 - 23)  SpO2: 97% (19 Sep 2019 13:10) (95% - 100%)    PHYSICAL EXAM:  Constitutional: NAD, well-developed  Neck: No LAD, supple  Respiratory: CTAB  Cardiovascular: S1 and S2, RRR,   Gastrointestinal: BS+, soft, NT/ND, neg HSM,    LABS:                        9.1    10.4  )-----------( 268      ( 19 Sep 2019 05:46 )             28.6     09-19    145  |  110<H>  |  20  ----------------------------<  103<H>  3.6   |  24  |  0.40<L>    Ca    8.7      19 Sep 2019 00:10  Phos  3.3     09-19  Mg     2.0     09-19    TPro  4.8<L>  /  Alb  2.3<L>  /  TBili  0.3  /  DBili  x   /  AST  12  /  ALT  7<L>  /  AlkPhos  77  09-19    PT/INR - ( 19 Sep 2019 00:10 )   PT: 12.1 sec;   INR: 1.05 ratio         PTT - ( 19 Sep 2019 00:10 )  PTT:32.4 sec    LIVER FUNCTIONS - ( 19 Sep 2019 00:10 )  Alb: 2.3 g/dL / Pro: 4.8 g/dL / ALK PHOS: 77 U/L / ALT: 7 U/L / AST: 12 U/L / GGT: x             RADIOLOGY & ADDITIONAL TESTS:

## 2019-09-19 NOTE — CHART NOTE - NSCHARTNOTEFT_GEN_A_CORE
MAR Accept Note  Transfer to: (X) Medicine    () Telemetry  Accepting Physician:  Assigned Room:  PATIENT SEEN AND EXAMINED    HPI/MICU COURSE:      67 y/o F with PMHx of aortic dissection (post-repair several years prior), spinal cord hematoma (now functionally paraplegic), chronic spasticity and pain (on Oxycodone and Baclofen pump), HTN, nephrolithiasis s/p left urethral stent, UTIs and endotheliitis/keratitis (post-corneal transplant), with recent hospitalizations at [facility] for UGIB with acute blood loss anemia requiring multiple PRBC transfusions. Patient was at home in early August 2019 when  thought she was not mentating well. He took her vitals at that time and found her to have a systolic BP in the 70s and HR in the 150s. When he brought her to [facility] they found her Hg to be 4.4. Unknown if she was having melena or hematochezia at that time. While she was there patient underwent four EGDs and received more than 10 units PRBC. While she was there patient underwent four EGDs and received more than 10 units PRBC. EGDs were unable to identify active source of bleeding and usually showed pooled blood. There is suspicion for dieulafoy, but unable to be acted upon at [place] with the multiple EGDs at times only showing adherent clot. Surgery consult recommended ex-lap given the severity, but patient's family deferred for now. Patient's PMD is Dr. Branham who recommended transfer to [facility] for evaluation by Dr. Ambrocio.     At [facility], EGD by Dr. Ambrocio 9/5 showed acute gastritis, gastric polyps with no source of GI bleed identified. Gastritis neg for H.Pylori. Enteroscopy 9/10 showed jejunum normal. small hiatal hernia. PillCam (VCE) study performed 9/11 showed active bleeding with large amount of blood noted. Bleeding site appeared to be in the distal aspect of the stomach. Emergent EGD was performed again on 9/13 after pt had bloody BM O/N but showed no evidence of active bleeding.  On morning of 9/17, Pt was tachycardic w/ HR in the 140s and notes to be in pain despite receiving pain medication. NGT was placed and aspirated approx 2-3cc of bring red blood. H/H was 7/21.7. S/p 2u PRBC today. Pt made NPO and underwent urgent EGD again on 9/17, which failed to show active bleeding sites. Pt intubated for EDG and pending transfer to MICU.      Of note, pt's hospital course c/b fevers up to 102.7F, leukocytosis and positive UA concerning for UTI. Ucx (9/9) was positive for Pseudomonas and Klebsiella ESBL. Bacteruria cleared after 5 day course of meropenum. 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 Baclofen pump due for refill 9/22.     At the MICU, pt was initially intubated and sedated w/ propofol. NG placed and lavage showing dark red blood/stomach contents. H/H dropped to 6.7 on 9/18, pt received 1U PRBC and responded appropriately. Pt then underwent CTA on 9/18, which was neg for any source of bleeding. GI is following.  During time in MICU, pt had HTN with BP in the 180 and put on labetalol 200mg BID for a goal SBP of 130-150.   Patient successfully extubated 9/19 following CTA. Pt advanced to liquid only diet. Discussed with GI and Dr. Branham regarding possible provoked bleeding test to find source of bleeding and was told that the procedure is contraindicated due to prior hx of spontaneous subdural hematoma. PT is pending transfer to floor following extubation.     OBJECTIVE DATA:  Vital Signs Last 24 Hrs  T(C): 36.5 (19 Sep 2019 08:00), Max: 37.6 (18 Sep 2019 16:00)  T(F): 97.7 (19 Sep 2019 08:00), Max: 99.6 (18 Sep 2019 16:00)  HR: 85 (19 Sep 2019 10:35) (74 - 101)  BP: 130/61 (19 Sep 2019 10:35) (119/56 - 130/61)  BP(mean): 91 (19 Sep 2019 10:35) (80 - 91)  RR: 16 (19 Sep 2019 10:35) (9 - 56)  SpO2: 97% (19 Sep 2019 10:35) (95% - 100%)    LABS                                            9.1                   Neurophils% (auto):   75.9   (09-19 @ 05:46):    10.4 )-----------(268          Lymphocytes% (auto):  13.5                                          28.6                   Eosinphils% (auto):   2.9      Manual%: Neutrophils x    ; Lymphocytes x    ; Eosinophils x    ; Bands%: x    ; Blasts x                                    145    |  110    |  20                  Calcium: 8.7   / iCa: x      (09-19 @ 00:10)    ----------------------------<  103       Magnesium: 2.0                              3.6     |  24     |  0.40             Phosphorous: 3.3      TPro  4.8    /  Alb  2.3    /  TBili  0.3    /  DBili  x      /  AST  12     /  ALT  7      /  AlkPhos  77     19 Sep 2019 00:10    ( 09-19 @ 00:10 )   PT: 12.1 sec;   INR: 1.05 ratio  aPTT: 32.4 sec    ABG - ( 18 Sep 2019 17:53 )  pH, Arterial: 7.44  pH, Blood: x     /  pCO2: 39    /  pO2: 173   / HCO3: 27    / Base Excess: 2.9   /  SaO2: 100               CARDIAC MARKERS ( 18 Sep 2019 05:05 )  x     / x     / 13 U/L / x     / x      CARDIAC MARKERS ( 18 Sep 2019 00:33 )  x     / x     / 19 U/L / x     / 2.3 ng/mL  CARDIAC MARKERS ( 17 Sep 2019 21:43 )  x     / x     / 23 U/L / x     / 3.0 ng/mL  CARDIAC MARKERS ( 17 Sep 2019 15:47 )  x     / x     / 30 U/L / x     / 3.3 ng/mL        ASSESSMENT/PLAN & FOLLOW-UP:     67 y/o F with PMHx of aortic dissection (post-repair several years prior), spinal cord hematoma (now functionally paraplegic), chronic spasticity and pain (on Oxycodone and Baclofen pump), HTN, nephrolithiasis s/p left urethral stent, UTIs and endotheliitis/keratitis (post-corneal transplant), with recent hospitalizations at [facility] for UGIB. s/p multiple PRBC transfusions and EGDs. CTA a/p  negative and unable to localize source of bleed.  Patient transferred to MICU for further management post EDG 9/17. Pt successfully extubated 9/19. Pending transfer to floors.     # Neuro:   - A&Ox3  - chronic pain -> switch to medication through NG tube.  - Functional paraplegia 2/2 spinal hematoma   - Baclofen pump replaced yesterday -> dc oral baclofen, clarify w/ Vaishali NP from [facility] if pump is filled w/ baclofen or baclofen+morphine     # CV:  - hx of aortic dissection -> post repair and appears stable on recent CTA performed at [place]  - HTN upon transfer w/ BP in 180/80s -> target -150  - c/w Labetalol 200mg BID     # Resp:  - Successfully extubated 9/18  - Satting appropriately on 1L NC  - no active issues     # GI:  - CTA w/o no definite of bleeding    - EGD 9/17 -> no active bleeding site found  - H/H currently stable   - CBC q6h->daily, active type and screen,  PRBC if Hgh dropped below 7   - Advance to liquid diet   - c/w protonix 40mg BID   - NG tube replaced 9/18, initially put out 80cc of coffee ground emesis. However, hb stable at ~10 no NGT out, pt on clears   - f/u with GI consult (Dr. Ambrocio [phone])  - Spoke with Dr. Basil Branham, who is pt's PMD regarding possible provoked bleeding test -> Dr. Branham notes that provoked bleeding test is contraindicated in pt due to previous hx of spontaneous subdural hematoma        # Renal  - Creatinine 0.40, will keep monitor   - continue to monitor UOP  - No active issues    # :  - Ucx (9/9) was positive for Pseudomonas and Klebsiella ESBL s/p meropenum x5day. Most recent Ucx neg.   - paraplegia: straight cath (patient has chronic urinary retention and is cathed by  at home) q4hr  - Miralax for chronic stool retention (does periodic manual disimpactions at home)    # ID:  - Ucx (9/9) was positive for Pseudomonas and Klebsiella ESBL s/p meropenum x5day -> Most recent Ucx (9/14) neg.  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 Need clarification with Vaishali Muniz NP from [facility] (extension: [phone], cell: [phone], Unable to contact 9/19 as she was off).    Chaz Moe  Medicine Resident, PGY-3

## 2019-09-19 NOTE — PROGRESS NOTE ADULT - ASSESSMENT
68F with nephrolithiasis s/p left ureteral stent and endotheliitis/keratitis (post-corneal transplant), hx aortic dissection (post-repair several years prior), spinal cord hematoma (now functionally paraplegic, she can move her left leg), chronic spasticity and pain (on PRN Oxycodone and has Baclofen pump), HTN transferred from [facility] for UGIB.     Had solitary fever 100.5F 2/2 GIB vs ?UTI. UA+, UCx ESBL Klebs and pseudomonas  significance of bacteruria unclear  patient gives vague history  ureteral stent/nephrolitisis may be colonized  leukocytosis resolved  bacteruria cleared on antibiotics  afebrile since 9/14  except for low grade temps  Endoscopy 9/17 demonstrated a large amount of blood but no clear bleeding source  chronic celiac ostium stenosis  no decompensation off antibiotics    ANTI-INFECTIVES  Valtrex 9/4-->  Ceftriaxone 9/9 -->13  Meropenem 9/13-->9/18    suggest  Continue current care    signing off case: Please reconsult ID as needed

## 2019-09-19 NOTE — PROGRESS NOTE ADULT - ATTENDING COMMENTS
I have interviewed and examined the patient and reviewed the residents note including the history, exam, assessment, and plan.  I agree with the residents assessment and plan.    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  Pt's exam findings discussed with patient's outpt ophthalmologist, Dr. Blank.  Aware that graft is failing.  Rec pred forte for treatment.  - stop Ocufox and Erythro ointment  - c/w Prednisolone acetate 1% drops QID in left eye  - c/w valtrex PO 1g TID   - outpatient follow up  - findings and plan discussed with primary team, will discuss with patient tomorrow.    Outpatient follow-up: Patient should follow-up with his/her ophthalmologist or with [facility] of Ophthalmology- Cornea within 1 week of after discharge  Shireen Joshi MD
I have interviewed and examined the patient and reviewed the residents note including the history, exam, assessment, and plan.  I agree with the residents assessment and plan.    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 No dendrite seen but ?defect represents healing dendrite vs. corneal abrasion 2/2 trauma (eye rubbing).  B-scan without detachment or mass OS.   - ofloxacin eye drops 1 drop 4 times per day to the left eye (outpatient documents allergy to ciprofloxacin, checked with patient and  over the phone who denies any allergies)  - erythromycin ointment two times per day to left eye  - valtrex 1g TID PO  - will follow closely, will re-attempt slit lamp exam tomorrow    Shireen Joshi MD
I have interviewed and examined the patient and reviewed the residents note including the history, exam, assessment, and plan.  I agree with the residents assessment and plan.    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  Pt's exam findings were discussed with patient's outpt ophthalmologist, Dr. Blank who is aware that graft is failing. At that time, corneal specialist recommended pred forte for treatment.  Will change regimen to the following:  - c/w Prednisolone acetate 1% drops TID in left eye  - divina gtts TID OS  - preservative free AT q2h OS  - c/w valtrex PO 1g TID   - outpatient follow up  - findings and plan discussed with Dr. Blank and primary team    Shireen Joshi MD
67 y/o F with PMHx of aortic dissection (post-repair several years prior), spinal cord hematoma (now functionally paraplegic), chronic spasticity and pain (on Oxycodone and Baclofen pump), nephrolithiasis s/p left urethral stent and endotheliitis/keratitis (post-corneal transplant) who has been admitted for GI bleed of unclear source. Concern for ? Dieulafoy lesion in area of prox duodenum. No active bleedign and Hb stable. Extubated and doing well since last night.   Pain service refilled baclofen/opiod pump. Now off abx for UTI. SCD for DVT pxx. Plan for transfer
npo for tentative egd  monitor cbc q8hr
69 y/o F with PMHx of aortic dissection (post-repair several years prior), spinal cord hematoma (now functionally paraplegic), chronic spasticity and pain (on Oxycodone and Baclofen pump), nephrolithiasis s/p left urethral stent and endotheliitis/keratitis (post-corneal transplant) who has been admitted for GI bleed of unclear source. Concern for ? Dieulafoy lesion in area of prox duodenum. Went for CTA today after repeat drop in hb but no active extravasation noted. Plan for PS trial and extubate if possible. Consulting Pain service to see if baclofen/opiod pump can be refilled. D/c abx for UTI today. SCD for DVT pxx.
PT eval pending
Bonnie Valentine MD  Division of Hospital Medicine  Pager: [phone]  Office: [phone]
Bonnie Valentine MD  Division of Hospital Medicine  Pager: [phone]  Office: [phone]
spoke w  in length and answered all questions
updated  on plan of care

## 2019-09-19 NOTE — CHART NOTE - NSCHARTNOTEFT_GEN_A_CORE
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  Suspicion for Dieulafoy's lesion, with patient seen by general surgery at [place] for recommendation for ex-lap but patient and spouse refused.  Apparently, multiple assessments at [place] with CTT angiogram nondiagnostic.  Patient transferred to [place] on recommendation by Dr. Branham above with patient seen by Dr. Ambrocio.  EGD on 9/5/19 9/19/2019    [facility]9    [facility] MEDICINE TRANSFER ACCEPT NOTE--Night Hospitalist:   Chart reviewed.  Patient remotely known to me from past hospitalizations at [place]--followed by her office physician Basil Branham MD--patient seen with patient's spouse in attendance--patient initially transferred from [facility] to [place] on 9/4/19 for a upper GI bleed--patient with a complex medical history of past aortic dissection repair, past spinal cord haematoma now functionally paraplegic (some movement of her LEFT leg), chronic pain syndrome and spasticity (chronic Oxycodone and Baclofen pump), essential HTN< past nephrolithiasis and ureteral stent, LEFT corneal transplant keratitis transferred from [place] on 9/4/19 following initial confusion at home by patient in early August 2019 with low BP and severe anaemia with apparently 4 EGD procedures at [place] with massive red blood transfusion with noted pooled blood on EGD but unable to definitively locate the bleeding source.  Suspicion for Dieulafoy's lesion, with patient seen by general surgery at [place] for recommendation for ex-lap but patient and spouse refused.  Apparently, multiple assessments at [place] with CTT angiogram nondiagnostic.  Patient transferred to [place] on recommendation by Dr. Branham above with patient seen by Dr. Ambrocio.  EGD on 9/5/19 with acute gastritis but apparently no clear bleeding source.  Enteroscopy five days later with normal jejunum.  Pill study the following day with active bleeding with suspicion for distal stomach.  Repeat EGD on 9/13/19 was nondiagnostic.  Patient four days later with clinical decompensation with tachycardia with NGT red blood aspirate with another EGD nondiagnostic and was elective intubated and transferred to the MICU.  Patient's baclofen pump was refilled.  Patient treated for a cystitis and seen by Dr. Guerrero of ID.  Apparently a recommendation by the MICU to GI and patient's office physician for a provoked bleeding test to elucidate the source of bleeding was declined due to a prior episode of spontaneous subdural haematoma.      Patient notes mild swelling of the RIGHT distal arm and hand from an IVF.   No pain.      ROS  No fever, no chills, no rigors.  No HA, no new focal weakness.  No abdominal pain, no red blood per rectum or melena.  NO back pain, no tearing back pain.  Unspecified weight loss, anorexia.    NO rash.  No joint pain.  NO vaginal bleeding.  No SI/HI.  NO thyroid symptoms.    NO tobacco or ethanol history.   Supportive spouse.    Physical exam with a middle aged, emaciated, chronically ill appearing F in no acute distress.    Afebrile.  HR  72.   RR 14.  BP  130/65  97% on RA  HEENT< PERRL< EOMI, oropharynx clear  Neck supple,  No thyromegaly.  Chest poor effort, grossly clear  Cor s1 s2  Declined breast exam.  Abdomen soft, scaphoid, normal bowel sounds, nontender  Ext with diffuse severe sarcopenia.  RIGHT lower arm/hand with mild swelling.  Pulses intact.  NO erythema or tenderness.  Neurologic exam AxOx3.  Speech fluent.  Cognition intact.  Paraplegic with LE contractures.  No SI/HI. 9/19/2019    [facility]9    [facility] MEDICINE TRANSFER ACCEPT NOTE--Night Hospitalist:   Chart reviewed.  Patient remotely known to me from past hospitalizations at [place]--followed by her office physician Basil Branham MD--patient seen with patient's spouse in attendance--patient initially transferred from [facility] to [place] on 9/4/19 for a upper GI bleed--patient with a complex medical history of past aortic dissection repair, past spinal cord haematoma now functionally paraplegic (some movement of her LEFT leg), chronic pain syndrome and spasticity (chronic Oxycodone and Baclofen pump), essential HTN< past nephrolithiasis and ureteral stent, LEFT corneal transplant keratitis transferred from [place] on 9/4/19 following initial confusion at home by patient in early August 2019 with low BP and severe anaemia with apparently 4 EGD procedures at [place] with massive red blood transfusion with noted pooled blood on EGD but unable to definitively locate the bleeding source.  Suspicion for Dieulafoy's lesion, with patient seen by general surgery at [place] for recommendation for ex-lap but patient and spouse refused.  Apparently, multiple assessments at [place] with CTT angiogram nondiagnostic.  Patient transferred to [place] on recommendation by Dr. Branham above with patient seen by Dr. Ambrocio.  EGD on 9/5/19 with acute gastritis but apparently no clear bleeding source.  Enteroscopy five days later with normal jejunum.  Pill study the following day with active bleeding with suspicion for distal stomach.  Repeat EGD on 9/13/19 was nondiagnostic.  Patient four days later with clinical decompensation with tachycardia with NGT red blood aspirate with another EGD nondiagnostic and was elective intubated and transferred to the MICU.  Patient's baclofen pump was refilled.  Patient treated for a cystitis and seen by Dr. Guerrero of ID.  Apparently a recommendation by the MICU to GI and patient's office physician for a provoked bleeding test to elucidate the source of bleeding was declined due to a prior episode of spontaneous subdural haematoma.      Patient notes mild swelling of the RIGHT distal arm and hand from an IVF.   No pain.      ROS  No fever, no chills, no rigors.  No HA, no new focal weakness.  No abdominal pain, no red blood per rectum or melena.  NO back pain, no tearing back pain.  Unspecified weight loss, anorexia.    NO rash.  No joint pain.  NO vaginal bleeding.  No SI/HI.  NO thyroid symptoms.    NO tobacco or ethanol history.   Supportive spouse.    Physical exam with a middle aged, emaciated, chronically ill appearing F in no acute distress.    Afebrile.  HR  72.   RR 14.  BP  130/65  97% on RA  HEENT< PERRL< EOMI, oropharynx clear  Neck supple,  No thyromegaly.  Chest poor effort, grossly clear  Cor s1 s2  Declined breast exam.  Abdomen soft, scaphoid, normal bowel sounds, nontender  Ext with diffuse severe sarcopenia.  RIGHT lower arm/hand with mild swelling.  Pulses intact.  NO erythema or tenderness.  Neurologic exam AxOx3.  Speech fluent.  Cognition intact.  Paraplegic with LE contractures.  No SI/HI.    Medex:  --Valtrex 100 mg TID  -- Vitamin C  -- Lipitor 40 mg  -- Cymbalta 30 mg BID  -- Neurontin 800 mg TID  -- Lidoderm patch.  -- Zofran PRN  -- Oxycodone IR TID  -- Protonix 40 IV BID  --Ambien PRN QHS  --Prednisolone gtt OS 4xD    WBC 10.4.  Hgb 9.1.   Platelets 268K  INR 1.0.  Alb 2.3.  K+ 3.6.  Random glucose of 103.  Cr 0.4.  Chest radiograph reviewed from 9/18/19 with no infiltrate or effusion.  ET tube in place (when patient was in the MICU), NGT in place.    EKG tracing reviewed from 9/17/19 with sinus tachycardia at 144. 9/19/2019    [facility]9    [facility] MEDICINE TRANSFER ACCEPT NOTE--Night Hospitalist:   Chart reviewed.  Patient remotely known to me from past hospitalizations at [place]--followed by her office physician Basil Branham MD--patient seen with patient's spouse in attendance--patient initially transferred from [facility] to [place] on 9/4/19 for a upper GI bleed--patient with a complex medical history of past aortic dissection repair, past spinal cord haematoma now functionally paraplegic (some movement of her LEFT leg), chronic pain syndrome and spasticity (chronic Oxycodone and Baclofen pump), essential HTN< past nephrolithiasis and ureteral stent, LEFT corneal transplant keratitis transferred from [place] on 9/4/19 following initial confusion at home by patient in early August 2019 with low BP and severe anaemia with apparently 4 EGD procedures at [place] with massive red blood transfusion with noted pooled blood on EGD but unable to definitively locate the bleeding source.  Suspicion for Dieulafoy's lesion, with patient seen by general surgery at [place] for recommendation for ex-lap but patient and spouse refused.  Apparently, multiple assessments at [place] with CTT angiogram nondiagnostic.  Patient transferred to [place] on recommendation by Dr. Branham above with patient seen by Dr. Ambrocio.  EGD on 9/5/19 with acute gastritis but apparently no clear bleeding source.  Enteroscopy five days later with normal jejunum.  Pill study the following day with active bleeding with suspicion for distal stomach.  Repeat EGD on 9/13/19 was nondiagnostic.  Patient four days later with clinical decompensation with tachycardia with NGT red blood aspirate with another EGD nondiagnostic and was elective intubated and transferred to the MICU.  Patient's baclofen pump was refilled.  Patient treated for a cystitis and seen by Dr. Guerrero of ID.  Apparently a recommendation by the MICU to GI and patient's office physician for a provoked bleeding test to elucidate the source of bleeding was declined due to a prior episode of spontaneous subdural haematoma.      Patient notes mild swelling of the RIGHT distal arm and hand from an IVF.   No pain.      ROS  No fever, no chills, no rigors.  No HA, no new focal weakness.  No abdominal pain, no red blood per rectum or melena.  NO back pain, no tearing back pain.  Unspecified weight loss, anorexia.    NO rash.  No joint pain.  NO vaginal bleeding.  No SI/HI.  NO thyroid symptoms.    NO tobacco or ethanol history.   Supportive spouse.    Physical exam with a middle aged, emaciated, chronically ill appearing F in no acute distress.    Afebrile.  HR  72.   RR 14.  BP  130/65  97% on RA  HEENT< PERRL< EOMI, oropharynx clear  Neck supple,  No thyromegaly.  Chest poor effort, grossly clear  Cor s1 s2  Declined breast exam.  Abdomen soft, scaphoid, normal bowel sounds, nontender  Ext with diffuse severe sarcopenia.  RIGHT lower arm/hand with mild swelling.  Pulses intact.  NO erythema or tenderness.  Neurologic exam AxOx3.  Speech fluent.  Cognition intact.  Paraplegic with LE contractures.  No SI/HI.    Medex:  --Valtrex 100 mg TID  -- Vitamin C  -- Lipitor 40 mg  -- Cymbalta 30 mg BID  -- Neurontin 800 mg TID  -- Labetalol 200 mg BID  -- Lidoderm patch.  -- Zofran PRN  -- Oxycodone IR TID  -- Protonix 40 IV BID  --Ambien PRN QHS  --Prednisolone gtt OS 4xD    WBC 10.4.  Hgb 9.1.   Platelets 268K  INR 1.0.  Alb 2.3.  K+ 3.6.  Random glucose of 103.  Cr 0.4.  Chest radiograph reviewed from 9/18/19 with no infiltrate or effusion.  ET tube in place (when patient was in the MICU), NGT in place.    EKG tracing reviewed from 9/17/19 with sinus tachycardia at 144.    S/P transfer to the floor following decompensation for upper GI bleed in the setting of this patient with a complex medical history as above with past aortic dissection repair, functional paraplegia following a past spinal cord haematoma, chronic pain and spasm, essential HTN, corneal transplant with massive red blood transfusion with nondiagnostic workup for a upper GI bleed.    1--  Upper GI bleed.        Following hgb but will have the DAY HOSPITALIST review with GI further considerations at this point 9/19/2019    [facility]9    [facility] [place] MEDICINE TRANSFER ACCEPT NOTE--Night Hospitalist:   Chart reviewed.  Patient remotely known to me from past hospitalizations at [place]--followed by her office physician Basil Branham MD--patient seen with patient's spouse in attendance--patient initially transferred from [facility] to [place] on 9/4/19 for a upper GI bleed--patient with a complex medical history of past aortic dissection repair, past spinal cord haematoma now functionally paraplegic (some movement of her LEFT leg), chronic pain syndrome and spasticity (chronic Oxycodone and Baclofen pump), essential HTN< past nephrolithiasis and ureteral stent, LEFT corneal transplant keratitis transferred from [place] on 9/4/19 following initial confusion at home by patient in early August 2019 with low BP and severe anaemia with apparently 4 EGD procedures at [place] with massive red blood transfusion with noted pooled blood on EGD but unable to definitively locate the bleeding source.  Suspicion for Dieulafoy's lesion, with patient seen by general surgery at [place] for recommendation for ex-lap but patient and spouse refused.  Apparently, multiple assessments at [place] with CTT angiogram nondiagnostic.  Patient transferred to [place] on recommendation by Dr. Branham above with patient seen by Dr. Ambrocio.  EGD on 9/5/19 with acute gastritis but apparently no clear bleeding source.  Enteroscopy five days later with normal jejunum.  Pill study the following day with active bleeding with suspicion for distal stomach.  Repeat EGD on 9/13/19 was nondiagnostic.  Patient four days later with clinical decompensation with tachycardia with NGT red blood aspirate with another EGD nondiagnostic and was elective intubated and transferred to the MICU.  Patient's baclofen pump was refilled.  Patient treated for a cystitis and seen by Dr. Guerrero of ID.  Apparently a recommendation by the MICU to GI and patient's office physician for a provoked bleeding test to elucidate the source of bleeding was declined due to a prior episode of spontaneous subdural haematoma.      Patient notes mild swelling of the RIGHT distal arm and hand from an IVF.   No pain.      ROS  No fever, no chills, no rigors.  No HA, no new focal weakness.  No abdominal pain, no red blood per rectum or melena.  NO back pain, no tearing back pain.  Unspecified weight loss, anorexia.    NO rash.  No joint pain.  NO vaginal bleeding.  No SI/HI.  NO thyroid symptoms.    NO tobacco or ethanol history.   Supportive spouse.    Physical exam with a middle aged, emaciated, chronically ill appearing F in no acute distress.    Afebrile.  HR  72.   RR 14.  BP  130/65  97% on RA  HEENT< PERRL< EOMI, oropharynx clear  Neck supple,  No thyromegaly.  Chest poor effort, grossly clear  Cor s1 s2  Declined breast exam.  Abdomen soft, scaphoid, normal bowel sounds, nontender  Ext with diffuse severe sarcopenia.  RIGHT lower arm/hand with mild swelling.  Pulses intact.  NO erythema or tenderness.  Neurologic exam AxOx3.  Speech fluent.  Cognition intact.  Paraplegic with LE contractures.  No SI/HI.    Medex:  --Valtrex 100 mg TID  -- Vitamin C  -- Lipitor 40 mg  -- Cymbalta 30 mg BID  -- Neurontin 800 mg TID  -- Labetalol 200 mg BID  -- Lidoderm patch.  -- Zofran PRN  -- Oxycodone IR TID  -- Protonix 40 IV BID  --Ambien PRN QHS  --Prednisolone gtt OS 4xD    WBC 10.4.  Hgb 9.1.   Platelets 268K  INR 1.0.  Alb 2.3.  K+ 3.6.  Random glucose of 103.  Cr 0.4.  Chest radiograph reviewed from 9/18/19 with no infiltrate or effusion.  ET tube in place (when patient was in the MICU), NGT in place.    EKG tracing reviewed from 9/17/19 with sinus tachycardia at 144.    S/P transfer to the floor following decompensation for upper GI bleed in the setting of this patient with a complex medical history as above with past aortic dissection repair, functional paraplegia following a past spinal cord haematoma, chronic pain and spasm, essential HTN, corneal transplant with massive red blood transfusion with nondiagnostic workup for a upper GI bleed.    1--  Upper GI bleed.        Following hgb but will have the DAY HOSPITALIST review with GI further considerations at this point.  Patient/ spouse have declined the bleeding provocation test and surgical options at [place].  Reviewed with spouse the difficulty in predicting bleeding recurrence and inherent lag with H/H with bleeding.  On clears.    2-- Essential HTN       Currently stable with labetalol.    3-- Pain syndrome     Baclofen pump as above.  Oxycodone and Neurontin.    4-- Keratitis      On prednisolone gtt as above.    5--  RIGHT lower arm swelling      Likely from prior IVF and transfusion requirements.   Cool compresses.    NIGHT HOSPITALIST:   Patient/ spouse in attendance aware of course and agree with plan/care as above.   Given patient's comorbidities, patient's long term prognosis is guarded.  Emotional support provided to patient/ spouse.   Care reviewed with covering NP/PA.  Care assumed by the DAY HOSPITALIST.    Reymundo Sam MD  [phone] 9/19/2019    [facility]9    [facility] MEDICINE TRANSFER ACCEPT NOTE--Night Hospitalist:   Chart reviewed.  Patient remotely known to me from past hospitalizations at [place]--followed by her office physician Basil Branham MD--patient seen with patient's spouse in attendance--patient initially transferred from [facility] to [place] on 9/4/19 for a upper GI bleed--patient with a complex medical history of past aortic dissection repair, past spinal cord haematoma now functionally paraplegic (some movement of her LEFT leg), chronic pain syndrome and spasticity (chronic Oxycodone and Baclofen pump), essential HTN< past nephrolithiasis and ureteral stent, LEFT corneal transplant keratitis transferred from [place] on 9/4/19 following initial confusion at home by patient in early August 2019 with low BP and severe anaemia with apparently 4 EGD procedures at [place] with massive red blood transfusion with noted pooled blood on EGD but unable to definitively locate the bleeding source.  Suspicion for Dieulafoy's lesion, with patient seen by general surgery at [place] for recommendation for ex-lap but patient and spouse refused.  Apparently, multiple assessments at [place] with CTT angiogram nondiagnostic.  Patient transferred to [place] on recommendation by Dr. Branham above with patient seen by Dr. Ambrocio.  EGD on 9/5/19 with acute gastritis but apparently no clear bleeding source.  Enteroscopy five days later with normal jejunum.  Pill study the following day with active bleeding with suspicion for distal stomach.  Repeat EGD on 9/13/19 was nondiagnostic.  Patient four days later with clinical decompensation with tachycardia with NGT red blood aspirate with another EGD nondiagnostic and was elective intubated and transferred to the MICU.  Patient's baclofen pump was refilled.  Patient treated for a cystitis and seen by Dr. Guerrero of ID.  Apparently a recommendation by the MICU to GI and patient's office physician for a provoked bleeding test to elucidate the source of bleeding was declined due to a prior episode of spontaneous subdural haematoma.      Patient notes mild swelling of the RIGHT distal arm and hand from an IVF.   No pain.      ROS  No fever, no chills, no rigors.  No HA, no new focal weakness.  No abdominal pain, no red blood per rectum or melena.  NO back pain, no tearing back pain.  Unspecified weight loss, anorexia.    NO rash.  No joint pain.  NO vaginal bleeding.  No SI/HI.  NO thyroid symptoms.    NO tobacco or ethanol history.   Supportive spouse.    Physical exam with a middle aged, emaciated, chronically ill appearing F in no acute distress.    Afebrile.  HR  72.   RR 14.  BP  130/65  97% on RA  HEENT< PERRL< EOMI, oropharynx clear  Neck supple,  No thyromegaly.  Chest poor effort, grossly clear  Cor s1 s2  Declined breast exam.  Abdomen soft, scaphoid, normal bowel sounds, nontender  Ext with diffuse severe sarcopenia.  RIGHT lower arm/hand with mild swelling.  Pulses intact.  NO erythema or tenderness.  Neurologic exam AxOx3.  Speech fluent.  Cognition intact.  Paraplegic with LE contractures.  No SI/HI.    Medex:  --Valtrex 100 mg TID  -- Vitamin C  -- Lipitor 40 mg  -- Cymbalta 30 mg BID  -- Neurontin 800 mg TID  -- Labetalol 200 mg BID  -- Lidoderm patch.  -- Zofran PRN  -- Oxycodone IR TID  -- Protonix 40 IV BID  --Ambien PRN QHS  --Prednisolone gtt OS 4xD    WBC 10.4.  Hgb 9.1.   Platelets 268K  INR 1.0.  Alb 2.3.  K+ 3.6.  Random glucose of 103.  Cr 0.4.  Chest radiograph reviewed from 9/18/19 with no infiltrate or effusion.  ET tube in place (when patient was in the MICU), NGT in place.    EKG tracing reviewed from 9/17/19 with sinus tachycardia at 144.    S/P transfer to the floor following decompensation for upper GI bleed in the setting of this patient with a complex medical history as above with past aortic dissection repair, functional paraplegia following a past spinal cord haematoma, chronic pain and spasm, essential HTN, corneal transplant with massive red blood transfusion with nondiagnostic workup for a upper GI bleed.   Patient with severely impaired functional status.    1--  Upper GI bleed.        Following hgb but will have the DAY HOSPITALIST review with GI further considerations at this point.  Patient/ spouse have declined the bleeding provocation test and surgical options at [place].  Reviewed with spouse the difficulty in predicting bleeding recurrence and inherent lag with H/H with bleeding.  On clears.    2-- Essential HTN       Currently stable with labetalol.    3-- Pain syndrome     Baclofen pump as above.  Oxycodone and Neurontin.    4-- Keratitis      On prednisolone gtt as above.    5--  RIGHT lower arm swelling      Likely from prior IVF and transfusion requirements.   Cool compresses.    NIGHT HOSPITALIST:   Patient/ spouse in attendance aware of course and agree with plan/care as above.   Given patient's comorbidities, patient's long term prognosis is guarded.  Emotional support provided to patient/ spouse.   Care reviewed with covering NP/PA.  Care assumed by the DAY HOSPITALIST.    Reymundo Sam MD  [phone]

## 2019-09-19 NOTE — PROGRESS NOTE ADULT - ASSESSMENT
In summary, elderly female with recurrent UGI bleed. Currently stable.    Plan:    Advance diet.  Continue to observe.  If signs of recurrent bleed, may have to proceed with another endoscopy.  No role for surgery or IR yet.  If continues to be stable for the ext 3-4 days may have to discharge and to be observed as outpatient.    Donell Ambrocio MD

## 2019-09-19 NOTE — PROGRESS NOTE ADULT - ASSESSMENT
67 y/o F with PMHx of aortic dissection (post-repair several years prior), spinal cord hematoma (now functionally paraplegic), chronic spasticity and pain (on Oxycodone and Baclofen pump), HTN, nephrolithiasis s/p left urethral stent, UTIs and endotheliitis/keratitis (post-corneal transplant), with recent hospitalizations at [facility] for UGIB. s/p multiple PRBC transfusions and EGDs. CTA a/p  negative and unable to localize source of bleed.  Patient transferred to MICU for further management post EDG 9/17. Pt successfully extubated 9/19. Pending transfer to floors.     # Neuro:   - A&Ox3  - chronic pain -> switch to medication through NG tube.  - Functional paraplegia 2/2 spinal hematoma   - Baclofen pump replaced yesterday    # CV:  - hx of aortic dissection -> post repair and appears stable on recent CTA performed at [place]  - HTN upon transfer w/ BP in 180/80s -> target -150  - c/w Labetalol 200mg BID     # Resp:  - Successfully intubated 9/18  - Satting appropriately on RA  - no active issues     # GI:  - CTA w/o no definite of bleeding    - EGD 9/17 -> no active bleeding site found  - Frequent H/H (CBC q6), active type and screen-> give PRBC if Hgh dropped below 7   - keep NPO  - c/w protonix 40mg BID   - - f/u with GI consult ( [phone]) -> updated results of CTA, says its okay to extubate following CTA, no provoked bleeding test for now   - NG tube replaced 9/18, initially put out 80cc of coffee ground emesis. However, hb stable at ~10.     # Renal  - Creatinine 0.40, will keep monitor   - No active issues    # :  - Ucx (9/9) was positive for Pseudomonas and Klebsiella ESBL s/p meropenum x5day. Most recent Ucx neg.   - paraplegia: straight cath (patient has chronic urinary retention and is cathed by  at home) q4hr  - Miralax for chronic stool retention (does periodic manual disimpactions at home)    # ID:  - Ucx (9/9) was positive for Pseudomonas and Klebsiella ESBL s/p meropenum x5day -> dc today. Most recent Ucx (9/14) neg.  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 69 y/o F with PMHx of aortic dissection (post-repair several years prior), spinal cord hematoma (now functionally paraplegic), chronic spasticity and pain (on Oxycodone and Baclofen pump), HTN, nephrolithiasis s/p left urethral stent, UTIs and endotheliitis/keratitis (post-corneal transplant), with recent hospitalizations at [facility] for UGIB. s/p multiple PRBC transfusions and EGDs. CTA a/p  negative and unable to localize source of bleed.  Patient transferred to MICU for further management post EDG 9/17. Pt successfully extubated 9/19. Pending transfer to floors.     # Neuro:   - A&Ox3  - chronic pain -> switch to medication through NG tube.  - Functional paraplegia 2/2 spinal hematoma   - Baclofen pump replaced yesterday    # CV:  - hx of aortic dissection -> post repair and appears stable on recent CTA performed at [place]  - HTN upon transfer w/ BP in 180/80s -> target -150  - c/w Labetalol 200mg BID     # Resp:  - Successfully extubated 9/18  - Satting appropriately on RA  - no active issues     # GI:  - CTA w/o no definite of bleeding    - EGD 9/17 -> no active bleeding site found  - Frequent H/H (CBC q6), active type and screen-> give PRBC if Hgh dropped below 7   - keep NPO  - c/w protonix 40mg BID   - - f/u with GI consult ( [phone]) -> updated results of CTA, says its okay to extubate following CTA, no provoked bleeding test for now   - NG tube replaced 9/18, initially put out 80cc of coffee ground emesis. However, hb stable at ~10.     # Renal  - Creatinine 0.40, will keep monitor   - No active issues    # :  - Ucx (9/9) was positive for Pseudomonas and Klebsiella ESBL s/p meropenum x5day. Most recent Ucx neg.   - paraplegia: straight cath (patient has chronic urinary retention and is cathed by  at home) q4hr  - Miralax for chronic stool retention (does periodic manual disimpactions at home)    # ID:  - Ucx (9/9) was positive for Pseudomonas and Klebsiella ESBL s/p meropenum x5day -> dc today. Most recent Ucx (9/14) neg.  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 69 y/o F with PMHx of aortic dissection (post-repair several years prior), spinal cord hematoma (now functionally paraplegic), chronic spasticity and pain (on Oxycodone and Baclofen pump), HTN, nephrolithiasis s/p left urethral stent, UTIs and endotheliitis/keratitis (post-corneal transplant), with recent hospitalizations at [facility] for UGIB. s/p multiple PRBC transfusions and EGDs. CTA a/p  negative and unable to localize source of bleed.  Patient transferred to MICU for further management post EDG 9/17. Pt successfully extubated 9/19. Pending transfer to floors.     # Neuro:   - A&Ox3  - chronic pain -> switch to medication through NG tube.  - Functional paraplegia 2/2 spinal hematoma   - Baclofen pump replaced yesterday -> dc oral baclofen     # CV:  - hx of aortic dissection -> post repair and appears stable on recent CTA performed at [place]  - HTN upon transfer w/ BP in 180/80s -> target -150  - c/w Labetalol 200mg BID     # Resp:  - Successfully extubated 9/18  - Satting appropriately on RA  - no active issues     # GI:  - CTA w/o no definite of bleeding    - EGD 9/17 -> no active bleeding site found  - Frequent H/H (CBC q6), active type and screen-> give PRBC if Hgh dropped below 7   - NPO -> advance to liquid diet   - c/w protonix 40mg BID   - NG tube replaced 9/18, initially put out 80cc of coffee ground emesis. However, hb stable at ~10 -> remove NG tube after bedside swallow evaluation   - f/u with GI consult ( [phone]) -> updated results of CTA, says its okay to extubate following CTA, no provoked bleeding test for now   - spoke with Dr.Kenneth Branham, who is familiar with patient's case regarding possibe provoked bleeding test -> says that provoked bleeding test contraindicated due to previous hx of subdural hematoma        # Renal  - Creatinine 0.40, will keep monitor   - No active issues    # :  - Ucx (9/9) was positive for Pseudomonas and Klebsiella ESBL s/p meropenum x5day. Most recent Ucx neg.   - paraplegia: straight cath (patient has chronic urinary retention and is cathed by  at home) q4hr  - Miralax for chronic stool retention (does periodic manual disimpactions at home)    # ID:  - Ucx (9/9) was positive for Pseudomonas and Klebsiella ESBL s/p meropenum x5day -> dc today. Most recent Ucx (9/14) neg.  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 69 y/o F with PMHx of aortic dissection (post-repair several years prior), spinal cord hematoma (now functionally paraplegic), chronic spasticity and pain (on Oxycodone and Baclofen pump), HTN, nephrolithiasis s/p left urethral stent, UTIs and endotheliitis/keratitis (post-corneal transplant), with recent hospitalizations at [facility] for UGIB. s/p multiple PRBC transfusions and EGDs. CTA a/p  negative and unable to localize source of bleed.  Patient transferred to MICU for further management post EDG 9/17. Pt successfully extubated 9/19. Pending transfer to floors.     # Neuro:   - A&Ox3  - chronic pain -> switch to medication through NG tube.  - Functional paraplegia 2/2 spinal hematoma   - Baclofen pump replaced yesterday -> dc oral baclofen     # CV:  - hx of aortic dissection -> post repair and appears stable on recent CTA performed at [place]  - HTN upon transfer w/ BP in 180/80s -> target -150  - c/w Labetalol 200mg BID     # Resp:  - Successfully extubated 9/18  - Satting appropriately on RA  - no active issues     # GI:  - CTA w/o no definite of bleeding    - EGD 9/17 -> no active bleeding site found  - H/H currently stable   - CBC q6h->daily, active type and screen,  PRBC if Hgh dropped below 7   - NPO -> advance to liquid diet   - c/w protonix 40mg BID   - NG tube replaced 9/18, initially put out 80cc of coffee ground emesis. However, hb stable at ~10 -> remove NG tube after bedside swallow evaluation   - f/u with GI consult ( [phone]) -> updated results of CTA, says its okay to extubate following CTA, no provoked bleeding test for now   - spoke with Dr. Basil Branham, who is familiar with patient's case regarding possible provoked bleeding test -> says that provoked bleeding test contraindicated due to previous hx of subdural hematoma        # Renal  - Creatinine 0.40, will keep monitor   - No active issues    # :  - Ucx (9/9) was positive for Pseudomonas and Klebsiella ESBL s/p meropenum x5day. Most recent Ucx neg.   - paraplegia: straight cath (patient has chronic urinary retention and is cathed by  at home) q4hr  - Miralax for chronic stool retention (does periodic manual disimpactions at home)    # ID:  - Ucx (9/9) was positive for Pseudomonas and Klebsiella ESBL s/p meropenum x5day -> dc today. Most recent Ucx (9/14) neg.  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

## 2019-09-19 NOTE — PROGRESS NOTE ADULT - SUBJECTIVE AND OBJECTIVE BOX
SUBJECTIVE / OVERNIGHT EVENTS:   Patient seen and examined at bedside. Patient succesfully extubated yesterday following CTA procedure, which did not show source of pt's UGIB. Baclofen pump replaced yesterday night by Chronic pain team/neurosurgery. Pt had 1x bloody BM at 10pm. H/H has been stable.      MEDICATIONS  (STANDING):  artificial tears (preservative free) Ophthalmic Solution 1 Drop(s) Left EYE every 2 hours  ascorbic acid 500 milliGRAM(s) Oral daily  atorvastatin 40 milliGRAM(s) Oral at bedtime  baclofen 20 milliGRAM(s) Oral four times a day  chlorhexidine 4% Liquid 1 Application(s) Topical <User Schedule>  DULoxetine 30 milliGRAM(s) Oral two times a day  gabapentin   Solution 800 milliGRAM(s) Oral three times a day  influenza   Vaccine 0.5 milliLiter(s) IntraMuscular once  labetalol 200 milliGRAM(s) Oral two times a day  lidocaine   Patch 1 Patch Transdermal daily  lidocaine   Patch 1 Patch Transdermal daily  pantoprazole  Injectable 40 milliGRAM(s) IV Push every 12 hours  prednisoLONE acetate 1% Suspension 1 Drop(s) Left EYE four times a day  valACYclovir 1000 milliGRAM(s) Oral three times a day    MEDICATIONS  (PRN):  ondansetron Injectable 4 milliGRAM(s) IV Push every 6 hours PRN Nausea and/or Vomiting  oxyCODONE    IR 10 milliGRAM(s) Oral three times a day PRN Moderate Pain (4 - 6)  zolpidem 5 milliGRAM(s) Oral at bedtime PRN Insomnia      CAPILLARY BLOOD GLUCOSE      POCT Blood Glucose.: 109 mg/dL (19 Sep 2019 05:31)  POCT Blood Glucose.: 102 mg/dL (19 Sep 2019 00:00)  POCT Blood Glucose.: 97 mg/dL (18 Sep 2019 12:47)      I&O's Summary    18 Sep 2019 07:01  -  19 Sep 2019 07:00  --------------------------------------------------------  IN: 589.4 mL / OUT: 1500 mL / NET: -910.6 mL        T(C): 37 (09-19-19 @ 04:00), Max: 37.6 (09-18-19 @ 16:00)  HR: 74 (09-19-19 @ 07:00) (74 - 111)  BP: 126/60 (09-19-19 @ 07:00) (126/60 - 126/60)  RR: 12 (09-19-19 @ 07:00) (9 - 56)  SpO2: 98% (09-19-19 @ 07:00) (95% - 100%)    PHYSICAL EXAM:  GENERAL: A&Ox3, NAD, lying in hospital bed, appears tired,  HEENT:  + L corneal transplant, right pupil reactive to light   Neck: supple   PULM: CTAB, no crackles, no wheezing   CV: +S1/S2, RRR, no murmurs appreciated   ABDOMEN: soft, nondistended, no tenderness to palpation, baclofen pump on RLQ   EXTREMITIES: mild peripheral edema, unchanged from yesterday    PSYCH: unable to assess  NEUROLOGY: A&Ox3, able to follow commands, functionally paraplegic at baseline (able to squeeze my figures and wiggles toes)   SKIN: no new rashes or ulcers noted       LABS:                        9.1    10.4  )-----------( 268      ( 19 Sep 2019 05:46 )             28.6     WBC Trend: 10.4<--, 11.5<--, 11.1<--  09-19    145  |  110<H>  |  20  ----------------------------<  103<H>  3.6   |  24  |  0.40<L>    Ca    8.7      19 Sep 2019 00:10  Phos  3.3     09-19  Mg     2.0     09-19    TPro  4.8<L>  /  Alb  2.3<L>  /  TBili  0.3  /  DBili  x   /  AST  12  /  ALT  7<L>  /  AlkPhos  77  09-19    Creatinine Trend: 0.40<--, 0.54<--, 0.60<--, 0.65<--, 0.39<--, 0.60<--  PT/INR - ( 19 Sep 2019 00:10 )   PT: 12.1 sec;   INR: 1.05 ratio         PTT - ( 19 Sep 2019 00:10 )  PTT:32.4 sec  CARDIAC MARKERS ( 18 Sep 2019 05:05 )  x     / x     / 13 U/L / x     / x      CARDIAC MARKERS ( 18 Sep 2019 00:33 )  x     / x     / 19 U/L / x     / 2.3 ng/mL  CARDIAC MARKERS ( 17 Sep 2019 21:43 )  x     / x     / 23 U/L / x     / 3.0 ng/mL  CARDIAC MARKERS ( 17 Sep 2019 15:47 )  x     / x     / 30 U/L / x     / 3.3 ng/mL      RADIOLOGY & ADDITIONAL TESTS:  < from: CT Angio Chest w/ IV Cont (09.18.19 @ 11:57) >    IMPRESSION:     No definite extravasation    Severe stenosis of celiac ostium. Hypertrophy of the distal branches of   celiac trunk and multiple collaterals are again noted.    MESHA KAUR M.D., RADIOLOGY RESIDENT  This document has been electronically signed.  EDITH PIERSON M.D., ATTENDING RADIOLOGIST  This document has been electronically signed. Sep 18 2019 12:38PM    < end of copied text >        Care Discussed with Consultants/Other Providers: SUBJECTIVE / OVERNIGHT EVENTS:   Patient seen and examined at bedside. Patient succesfully extubated yesterday following CTA procedure, which did not show source of pt's UGIB. Baclofen pump replaced yesterday night by Chronic pain team/neurosurgery. Pt had 1x bloody BM at 10pm. H/H has been stable.      MEDICATIONS  (STANDING):  artificial tears (preservative free) Ophthalmic Solution 1 Drop(s) Left EYE every 2 hours  ascorbic acid 500 milliGRAM(s) Oral daily  atorvastatin 40 milliGRAM(s) Oral at bedtime  baclofen 20 milliGRAM(s) Oral four times a day  chlorhexidine 4% Liquid 1 Application(s) Topical <User Schedule>  DULoxetine 30 milliGRAM(s) Oral two times a day  gabapentin   Solution 800 milliGRAM(s) Oral three times a day  influenza   Vaccine 0.5 milliLiter(s) IntraMuscular once  labetalol 200 milliGRAM(s) Oral two times a day  lidocaine   Patch 1 Patch Transdermal daily  lidocaine   Patch 1 Patch Transdermal daily  pantoprazole  Injectable 40 milliGRAM(s) IV Push every 12 hours  prednisoLONE acetate 1% Suspension 1 Drop(s) Left EYE four times a day  valACYclovir 1000 milliGRAM(s) Oral three times a day    MEDICATIONS  (PRN):  ondansetron Injectable 4 milliGRAM(s) IV Push every 6 hours PRN Nausea and/or Vomiting  oxyCODONE    IR 10 milliGRAM(s) Oral three times a day PRN Moderate Pain (4 - 6)  zolpidem 5 milliGRAM(s) Oral at bedtime PRN Insomnia      CAPILLARY BLOOD GLUCOSE      POCT Blood Glucose.: 109 mg/dL (19 Sep 2019 05:31)  POCT Blood Glucose.: 102 mg/dL (19 Sep 2019 00:00)  POCT Blood Glucose.: 97 mg/dL (18 Sep 2019 12:47)      I&O's Summary    18 Sep 2019 07:01  -  19 Sep 2019 07:00  --------------------------------------------------------  IN: 589.4 mL / OUT: 1500 mL / NET: -910.6 mL        T(C): 37 (09-19-19 @ 04:00), Max: 37.6 (09-18-19 @ 16:00)  HR: 74 (09-19-19 @ 07:00) (74 - 111)  BP: 126/60 (09-19-19 @ 07:00) (126/60 - 126/60)  RR: 12 (09-19-19 @ 07:00) (9 - 56)  SpO2: 98% (09-19-19 @ 07:00) (95% - 100%)    PHYSICAL EXAM:  GENERAL: A&Ox3, NAD, lying in hospital bed, appears tired,  HEENT:  + L corneal transplant, right pupil reactive to light   Neck: supple   PULM: CTAB, no crackles, no wheezing   CV: +S1/S2, RRR, no murmurs appreciated   ABDOMEN: soft, nondistended, no tenderness to palpation, baclofen pump on RLQ   EXTREMITIES: mild peripheral edema, unchanged from yesterday    PSYCH: appropriate affect and mood  NEUROLOGY: A&Ox3, able to follow commands, functionally paraplegic at baseline (able to squeeze my figures and wiggles toes)   SKIN: no new rashes or ulcers noted       LABS:                        9.1    10.4  )-----------( 268      ( 19 Sep 2019 05:46 )             28.6     WBC Trend: 10.4<--, 11.5<--, 11.1<--  09-19    145  |  110<H>  |  20  ----------------------------<  103<H>  3.6   |  24  |  0.40<L>    Ca    8.7      19 Sep 2019 00:10  Phos  3.3     09-19  Mg     2.0     09-19    TPro  4.8<L>  /  Alb  2.3<L>  /  TBili  0.3  /  DBili  x   /  AST  12  /  ALT  7<L>  /  AlkPhos  77  09-19    Creatinine Trend: 0.40<--, 0.54<--, 0.60<--, 0.65<--, 0.39<--, 0.60<--  PT/INR - ( 19 Sep 2019 00:10 )   PT: 12.1 sec;   INR: 1.05 ratio         PTT - ( 19 Sep 2019 00:10 )  PTT:32.4 sec  CARDIAC MARKERS ( 18 Sep 2019 05:05 )  x     / x     / 13 U/L / x     / x      CARDIAC MARKERS ( 18 Sep 2019 00:33 )  x     / x     / 19 U/L / x     / 2.3 ng/mL  CARDIAC MARKERS ( 17 Sep 2019 21:43 )  x     / x     / 23 U/L / x     / 3.0 ng/mL  CARDIAC MARKERS ( 17 Sep 2019 15:47 )  x     / x     / 30 U/L / x     / 3.3 ng/mL      RADIOLOGY & ADDITIONAL TESTS:  < from: CT Angio Chest w/ IV Cont (09.18.19 @ 11:57) >    IMPRESSION:     No definite extravasation    Severe stenosis of celiac ostium. Hypertrophy of the distal branches of   celiac trunk and multiple collaterals are again noted.    MESHA KAUR M.D., RADIOLOGY RESIDENT  This document has been electronically signed.  EDITH PIERSON M.D., ATTENDING RADIOLOGIST  This document has been electronically signed. Sep 18 2019 12:38PM    < end of copied text >        Care Discussed with Consultants/Other Providers: SUBJECTIVE / OVERNIGHT EVENTS:   Patient seen and examined at bedside. Patient succesfully extubated yesterday following CTA procedure, which did not show source of pt's UGIB. Baclofen pump replaced yesterday night by Chronic pain team/neurosurgery. Pt had 1x bloody BM at 10pm. H/H has been stable.      MEDICATIONS  (STANDING):  artificial tears (preservative free) Ophthalmic Solution 1 Drop(s) Left EYE every 2 hours  ascorbic acid 500 milliGRAM(s) Oral daily  atorvastatin 40 milliGRAM(s) Oral at bedtime  baclofen 20 milliGRAM(s) Oral four times a day  chlorhexidine 4% Liquid 1 Application(s) Topical <User Schedule>  DULoxetine 30 milliGRAM(s) Oral two times a day  gabapentin   Solution 800 milliGRAM(s) Oral three times a day  influenza   Vaccine 0.5 milliLiter(s) IntraMuscular once  labetalol 200 milliGRAM(s) Oral two times a day  lidocaine   Patch 1 Patch Transdermal daily  lidocaine   Patch 1 Patch Transdermal daily  pantoprazole  Injectable 40 milliGRAM(s) IV Push every 12 hours  prednisoLONE acetate 1% Suspension 1 Drop(s) Left EYE four times a day  valACYclovir 1000 milliGRAM(s) Oral three times a day    MEDICATIONS  (PRN):  ondansetron Injectable 4 milliGRAM(s) IV Push every 6 hours PRN Nausea and/or Vomiting  oxyCODONE    IR 10 milliGRAM(s) Oral three times a day PRN Moderate Pain (4 - 6)  zolpidem 5 milliGRAM(s) Oral at bedtime PRN Insomnia      CAPILLARY BLOOD GLUCOSE      POCT Blood Glucose.: 109 mg/dL (19 Sep 2019 05:31)  POCT Blood Glucose.: 102 mg/dL (19 Sep 2019 00:00)  POCT Blood Glucose.: 97 mg/dL (18 Sep 2019 12:47)      I&O's Summary    18 Sep 2019 07:01  -  19 Sep 2019 07:00  --------------------------------------------------------  IN: 589.4 mL / OUT: 1500 mL / NET: -910.6 mL        T(C): 37 (09-19-19 @ 04:00), Max: 37.6 (09-18-19 @ 16:00)  HR: 74 (09-19-19 @ 07:00) (74 - 111)  BP: 126/60 (09-19-19 @ 07:00) (126/60 - 126/60)  RR: 12 (09-19-19 @ 07:00) (9 - 56)  SpO2: 98% (09-19-19 @ 07:00) (95% - 100%)    PHYSICAL EXAM:  GENERAL: A&Ox3, NAD, lying in hospital bed, appears tired,  HEENT:  + L corneal transplant, right pupil reactive to light   Neck: supple   PULM: CTAB, no crackles, no wheezing   CV: +S1/S2, RRR, no murmurs appreciated   ABDOMEN: soft, nondistended, no tenderness to palpation, +BS, baclofen pump on RLQ   EXTREMITIES: mild peripheral edema, unchanged from yesterday    PSYCH: appropriate affect and mood  NEUROLOGY: A&Ox3, able to follow commands, functionally paraplegic at baseline (able to squeeze my figures and wiggles toes)   SKIN: no new rashes or ulcers noted       LABS:                        9.1    10.4  )-----------( 268      ( 19 Sep 2019 05:46 )             28.6     WBC Trend: 10.4<--, 11.5<--, 11.1<--  09-19    145  |  110<H>  |  20  ----------------------------<  103<H>  3.6   |  24  |  0.40<L>    Ca    8.7      19 Sep 2019 00:10  Phos  3.3     09-19  Mg     2.0     09-19    TPro  4.8<L>  /  Alb  2.3<L>  /  TBili  0.3  /  DBili  x   /  AST  12  /  ALT  7<L>  /  AlkPhos  77  09-19    Creatinine Trend: 0.40<--, 0.54<--, 0.60<--, 0.65<--, 0.39<--, 0.60<--  PT/INR - ( 19 Sep 2019 00:10 )   PT: 12.1 sec;   INR: 1.05 ratio         PTT - ( 19 Sep 2019 00:10 )  PTT:32.4 sec  CARDIAC MARKERS ( 18 Sep 2019 05:05 )  x     / x     / 13 U/L / x     / x      CARDIAC MARKERS ( 18 Sep 2019 00:33 )  x     / x     / 19 U/L / x     / 2.3 ng/mL  CARDIAC MARKERS ( 17 Sep 2019 21:43 )  x     / x     / 23 U/L / x     / 3.0 ng/mL  CARDIAC MARKERS ( 17 Sep 2019 15:47 )  x     / x     / 30 U/L / x     / 3.3 ng/mL      RADIOLOGY & ADDITIONAL TESTS:  < from: CT Angio Chest w/ IV Cont (09.18.19 @ 11:57) >    IMPRESSION:     No definite extravasation    Severe stenosis of celiac ostium. Hypertrophy of the distal branches of   celiac trunk and multiple collaterals are again noted.    MESHA KAUR M.D., RADIOLOGY RESIDENT  This document has been electronically signed.  EDITH PIERSON M.D., ATTENDING RADIOLOGIST  This document has been electronically signed. Sep 18 2019 12:38PM    < end of copied text >        Care Discussed with Consultants/Other Providers:

## 2019-09-20 NOTE — CONSULT NOTE ADULT - ASSESSMENT
Impression:    Incontinence dermatitis  Incontinence of bladder and bowel    Recommend:  1.) topical therapy: sacrum/bilateral buttocks - cleanse with incontinence cleanser, apply Jer ointment BID,  2.) offload heels with z-emily boots  3.) maintain on an alternating air with low air loss surface  4.) turn and reposition Q2 hours  5.) nutrition input noted  6.) Emollient therapy: Moisturize intact skin w/ SWEEN cream daily  7.) Incontience management - incontinence cleanser and pads, pericare BID, consider external female urinary catheter, offer frequent toileting opportunities  8.) Chair cushion for chair sitting  9.) Home equipment in place    Care as per medicine will not follow. Please recall for new issues.  Upon [facility] [address] [place] [address] [phone]  Seen and discussed with clinical nurse  Thank you for this consult  Nasrin Bro, NP-C, CWOCN [id number].

## 2019-09-20 NOTE — PROGRESS NOTE ADULT - ASSESSMENT
67 y/o F with PMHx of aortic dissection (post-repair several years prior), spinal cord hematoma (now functionally paraplegic), chronic spasticity and pain (on Oxycodone and Baclofen pump), HTN, nephrolithiasis s/p left urethral stent, UTIs and endotheliitis/keratitis (post-corneal transplant) presented as a transfer from [facility] for UGIB with acute blood loss anemia requiring multiple PRBC transfusions for further management of GI bleed. Work up prior to presentation included CTA A/P which was unable to localize source of bleed, abd multiple EGD's which showed pooled blood and/or adherent clot in gastric fundus that could not be removed. Patient transferred here for further management and evaluation by GI, she has undergone 4 endoscopic evaluations, and capsule study without clear source of bleeding. She briefly required MICU stay for urgent endoscopic evaluation in the setting of bleed, but has since been transferred back to medicine for further management.

## 2019-09-20 NOTE — CHART NOTE - NSCHARTNOTEFT_GEN_A_CORE
Received call from Medicine NP regarding episode of Melena. Otherwise, patient is hemodynamically stable (VS within normal limits). I reached out to on-call physician for Dr. Ambrocio's group. He will call Dr. Ambrocio to discuss the case and reach out to our team with any acute changes.  Per NP, patient is having abdominal pain, otherwise abdomen is soft, minimally tender, + BS.  Her exam has been unchanged throughout the day.     Plan relayed to Medicine NP:  - q6 hour CBC. We are sending one stat.  - Ensure 2 large bore IV  - Continue with supportive care: will transfuse to Hgb, continue PPI IV BID  - Active T&S  - F/U repeat CBC and we will call Dr. Ambrocio's partner (Dr. KATZ) with result; we await GI plan from there  - She has been scoped multiple times; I informed NP with any clinical change, call GI stat, hospitalist in charge overnight, and ultimately, IR- as she may require embolization

## 2019-09-20 NOTE — PROGRESS NOTE ADULT - SUBJECTIVE AND OBJECTIVE BOX
- Pump has been refilled.  - Spasticity controlled but with continued chronic pain.  - Extubated.    MEDICATIONS  (STANDING):  artificial tears (preservative free) Ophthalmic Solution 1 Drop(s) Left EYE every 2 hours  ascorbic acid 500 milliGRAM(s) Oral daily  atorvastatin 40 milliGRAM(s) Oral at bedtime  chlorhexidine 4% Liquid 1 Application(s) Topical <User Schedule>  DULoxetine 30 milliGRAM(s) Oral two times a day  gabapentin   Solution 800 milliGRAM(s) Oral three times a day  labetalol 200 milliGRAM(s) Oral two times a day  lidocaine   Patch 1 Patch Transdermal daily  pantoprazole  Injectable 40 milliGRAM(s) IV Push every 12 hours  prednisoLONE acetate 1% Suspension 1 Drop(s) Left EYE four times a day  sodium chloride 2% Ophthalmic Solution 1 Drop(s) Left EYE three times a day  valACYclovir 1000 milliGRAM(s) Oral three times a day    MEDICATIONS  (PRN):  ondansetron Injectable 4 milliGRAM(s) IV Push every 6 hours PRN Nausea and/or Vomiting  oxyCODONE    IR 10 milliGRAM(s) Oral three times a day PRN Moderate Pain (4 - 6)  zolpidem 5 milliGRAM(s) Oral at bedtime PRN Insomnia    Vital Signs Last 24 Hrs  T(C): 36.4 (20 Sep 2019 08:00), Max: 36.7 (20 Sep 2019 00:09)  T(F): 97.5 (20 Sep 2019 08:00), Max: 98.1 (20 Sep 2019 00:09)  HR: 72 (20 Sep 2019 08:00) (67 - 78)  BP: 163/72 (20 Sep 2019 08:00) (130/65 - 164/66)  BP(mean): --  RR: 18 (20 Sep 2019 08:00) (18 - 19)  SpO2: 96% (20 Sep 2019 08:00) (93% - 97%)    Alert  In good spirits  bl LEs MAS 1+ adductor spasticity, no distal spasticity noted  trace edema    Imp/Recs:  - Baclofen/Pain pump refilled- patient with good control of spasticity  - PT- ROM, bed mob, transfers  - Skin- Turn q2h, check skin daily

## 2019-09-20 NOTE — PROGRESS NOTE ADULT - PROBLEM SELECTOR PLAN 7
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

## 2019-09-20 NOTE — PROGRESS NOTE ADULT - PROBLEM SELECTOR PLAN 6
Patient had recent left sided nephrolithiasis with urethral stent placement  KUB showing rounded density over L utereter as seen on CT previously  - urology follow up opt

## 2019-09-20 NOTE — PROGRESS NOTE ADULT - PROBLEM SELECTOR PLAN 2
(+)UA, meeting sepsis criteria, BCX NGTD 9/14  Urine culture with ESBL Kleb and Pseudomonas  Ceftriaxone (9/9-9/13) switched to meropenem (9/13-9/18)  Afebrile and stable off antibiotics. ID was following, they have since signed off  - Will continue to monitor off antibiotics

## 2019-09-20 NOTE — CONSULT NOTE ADULT - REASON FOR ADMISSION
Transfer from [facility] for UGIB
Transfer from [facility] for UGIB
Transfer from [facility] for UGIB
Transfer from [facility] for UGIB
Transfer from [facility] for UGIB
Transfer from [facility] for UGIB
Transfer from [facility] for UGIB

## 2019-09-20 NOTE — CHART NOTE - NSCHARTNOTEFT_GEN_A_CORE
Called by RN earlier today for pt c/o abdominal pain . Called by RN earlier today for pt c/o abdominal pain earlier today. Pt seen VSS. Pt c/o pain is at 20. She could not describe the pain, but mentions it is a constant pain. Assessed pt abd, non - tender, non- distended, soft, +BS. Discussed with RN, who verbalized that she has not been straight cath since 2 am to 10 am this am . Instructed RN to straight cath -> 500 ml drained. Pt reassessed, and reported pain 5/10. D/w Dr. Monterroso who agree ordering abdominal xray and follow up with cbc q12hrs. Notified by RN this evening at 1845 that pt had episode of melena earlier during the day after being straight cath.      Vital Signs Last 24 Hrs  T(C): 36.4 (20 Sep 2019 15:00), Max: 36.7 (20 Sep 2019 00:09)  T(F): 97.6 (20 Sep 2019 15:00), Max: 98.1 (20 Sep 2019 00:09)  HR: 78 (20 Sep 2019 15:00) (67 - 89)  BP: 138/71 (20 Sep 2019 15:00) (136/67 - 164/66)  BP(mean): --  ABP: --  ABP(mean): --  RR: 18 (20 Sep 2019 15:00) (18 - 19)  SpO2: 95% (20 Sep 2019 15:00) (93% - 97%)     HPI:  68F with PMHx of aortic dissection (post-repair several years prior), spinal cord hematoma (now functionally paraplegic, she can move her left leg), chronic spasticity and pain (on PRN Oxycodone and has Baclofen pump), HTN, nephrolithiasis s/p left urethral stent and endotheliitis/keratitis (post-corneal transplant) transferred from [facility] after suffering UGIB. Patient has complicated medical history and recent medical course not full reflected on chart review, but collateral obtained from . Patient was at home in early August 2019 when  though she was not mentating well. He took her vitals at that time and found her to have a systolic BP in the 70s and HR in the 150s. When he brought her to [facility] they found her Hg to be 4.4. Unknown if she was having melena or hematochezia at that time. While she was there patient underwent four EGDs and received more than 10 units PRBC. EGDs were unable to identify active source of bleeding and usually showed pooled blood. There is suspicion for dieulafoy, but unable to be acted upon at [facility] with the multiple EGDs at times only showing adherent clot. She was evaluated by surgery who recommended ex-lap given the severity, but patient's family deferred for now. Patient spent a majority of her time in [facility] at the ICU. CT angiogram could not identify active source of bleeding with IR evaluation stating they could not provide a solution given the patient's previous dissection and collateral arterial formation. Patient's PMD is Dr. Branham who recommended transfer to [facility] for evaluation by Dr. Ambrocio. When seen by me patient had no active complaints. She states her Baclofen pump is empty. She denies any hematochezia or melena. She is slightly fretful given the recent medical events and is hopeful that a solution can be found. (04 Sep 2019 20:17)    Plan:  -Discussed with Dr. Monterroso, recommended repeat cbc stat and trending cbc q6 hrs.  Dr. Monterroso had discussion with covering GI, and recommended primary team to call with results of CBC   - PT to remain NPO and continue with Protonix gtt   - monitor V/S , if pt becomes hemodynamically unstable night primary team to recall GI  and may have to consider  need for IR embolization.  Night NP aware to followup with cbc this evening.   Jen AMADO Called by RN earlier today for pt c/o abdominal pain earlier today. Pt seen VSS. Pt c/o pain is at 20. She could not describe the pain, but mentions it is a constant pain. Assessed pt abd, non - tender, non- distended, soft, +BS. Discussed with RN, who verbalized that she has not been straight cath since 2 am to 10 am this am . Instructed RN to straight cath -> 500 ml drained. Pt reassessed, and reported pain 5/10. D/w Dr. Monterroso who agree ordering abdominal xray and follow up with cbc q12hrs. Notified by RN this evening at 1845 that pt had episode of melena earlier during the day after being straight cath.      Vital Signs Last 24 Hrs  T(C): 36.4 (20 Sep 2019 15:00), Max: 36.7 (20 Sep 2019 00:09)  T(F): 97.6 (20 Sep 2019 15:00), Max: 98.1 (20 Sep 2019 00:09)  HR: 78 (20 Sep 2019 15:00) (67 - 89)  BP: 138/71 (20 Sep 2019 15:00) (136/67 - 164/66)  BP(mean): --  ABP: --  ABP(mean): --  RR: 18 (20 Sep 2019 15:00) (18 - 19)  SpO2: 95% (20 Sep 2019 15:00) (93% - 97%)     HPI:  68F with PMHx of aortic dissection (post-repair several years prior), spinal cord hematoma (now functionally paraplegic, she can move her left leg), chronic spasticity and pain (on PRN Oxycodone and has Baclofen pump), HTN, nephrolithiasis s/p left urethral stent and endotheliitis/keratitis (post-corneal transplant) transferred from [facility] after suffering UGIB. Patient has complicated medical history and recent medical course not full reflected on chart review, but collateral obtained from . Patient was at home in early August 2019 when  though she was not mentating well. He took her vitals at that time and found her to have a systolic BP in the 70s and HR in the 150s. When he brought her to [facility] they found her Hg to be 4.4. Unknown if she was having melena or hematochezia at that time. While she was there patient underwent four EGDs and received more than 10 units PRBC. EGDs were unable to identify active source of bleeding and usually showed pooled blood. There is suspicion for dieulafoy, but unable to be acted upon at [facility] with the multiple EGDs at times only showing adherent clot. She was evaluated by surgery who recommended ex-lap given the severity, but patient's family deferred for now. Patient spent a majority of her time in [place] at the ICU. CT angiogram could not identify active source of bleeding with IR evaluation stating they could not provide a solution given the patient's previous dissection and collateral arterial formation. Patient's PMD is Dr. Branham who recommended transfer to [facility] for evaluation by Dr. Ambrocio. When seen by me patient had no active complaints. She states her Baclofen pump is empty. She denies any hematochezia or melena. She is slightly fretful given the recent medical events and is hopeful that a solution can be found. (04 Sep 2019 20:17)    Plan:  -Discussed with Dr. Monterroso, recommended repeat cbc stat and trending cbc q6 hrs.  Dr. Monterroso had discussion with covering GI, and recommended primary team to call with results of CBC   - PT to remain NPO and continue with Protonix gtt   - monitor V/S , if pt becomes hemodynamically unstable night primary team to recall GI  and may have to consider  need for IR embolization.  -follow up abdominal xray   Night NP aware to followup with cbc this evening.   Jen AMADO Called by RN earlier today for pt c/o abdominal pain earlier today. Pt seen VSS. Pt c/o pain is at 20. She could not describe the pain, but mentions it is a constant pain. Assessed pt abd, non - tender, non- distended, soft, +BS. Discussed with RN, who verbalized that she has not been straight cath since 2 am to 10 am this am . Instructed RN to straight cath -> 500 ml drained. Pt reassessed, and reported pain 5/10. D/w Dr. Monterroso who agree ordering abdominal xray and follow up with cbc q12hrs. Notified by RN this evening at 1845 that pt had episode of melena earlier during the day after being straight cath.      Vital Signs Last 24 Hrs  T(C): 36.4 (20 Sep 2019 15:00), Max: 36.7 (20 Sep 2019 00:09)  T(F): 97.6 (20 Sep 2019 15:00), Max: 98.1 (20 Sep 2019 00:09)  HR: 78 (20 Sep 2019 15:00) (67 - 89)  BP: 138/71 (20 Sep 2019 15:00) (136/67 - 164/66)  BP(mean): --  ABP: --  ABP(mean): --  RR: 18 (20 Sep 2019 15:00) (18 - 19)  SpO2: 95% (20 Sep 2019 15:00) (93% - 97%)     HPI:  68F with PMHx of aortic dissection (post-repair several years prior), spinal cord hematoma (now functionally paraplegic, she can move her left leg), chronic spasticity and pain (on PRN Oxycodone and has Baclofen pump), HTN, nephrolithiasis s/p left urethral stent and endotheliitis/keratitis (post-corneal transplant) transferred from [facility] after suffering UGIB. Patient has complicated medical history and recent medical course not full reflected on chart review, but collateral obtained from . Patient was at home in early August 2019 when  though she was not mentating well. He took her vitals at that time and found her to have a systolic BP in the 70s and HR in the 150s. When he brought her to [facility] they found her Hg to be 4.4. Unknown if she was having melena or hematochezia at that time. While she was there patient underwent four EGDs and received more than 10 units PRBC. EGDs were unable to identify active source of bleeding and usually showed pooled blood. There is suspicion for dieulafoy, but unable to be acted upon at [place] with the multiple EGDs at times only showing adherent clot. She was evaluated by surgery who recommended ex-lap given the severity, but patient's family deferred for now. Patient spent a majority of her time in [place] at the ICU. CT angiogram could not identify active source of bleeding with IR evaluation stating they could not provide a solution given the patient's previous dissection and collateral arterial formation. Patient's PMD is Dr. Branham who recommended transfer to [facility] for evaluation by Dr. Ambrocio. When seen by me patient had no active complaints. She states her Baclofen pump is empty. She denies any hematochezia or melena. She is slightly fretful given the recent medical events and is hopeful that a solution can be found. (04 Sep 2019 20:17)    Plan:  -Discussed with Dr. Monterroso, recommended repeat cbc stat and trending cbc q6 hrs.  Dr. Monterroso had discussion with covering GI, and recommended primary team to call with results of CBC   - PT to remain NPO, until f/u with abd xray  and continue with Protonix gtt   - monitor V/S , if pt becomes hemodynamically unstable night primary team to recall GI  and may have to consider  need for IR embolization.  Night NP aware to followup with cbc this evening.   Jen NP

## 2019-09-20 NOTE — PROGRESS NOTE ADULT - PROBLEM SELECTOR PLAN 8
- Continue with multi-modal pain management: Tylenol mild-mod pain, Morphine 2 q4h PRN severe pain, Baclofen 20 mg TID, Duloxetine 20 mg BID, and Gabapentin increased to 800 TID  - Patient has baclofen pump; refilled  - oxycodone 5mg IR bid, oxycodone 10mg qhs, gabapentin increased to 800mg tid

## 2019-09-20 NOTE — PROGRESS NOTE ADULT - PROBLEM SELECTOR PLAN 4
Secondary to spinal hematoma  - straight cath (patient has chronic urinary retention and is cathed by her  at home) q4hr  - Miralax for chronic stool retention (does periodic manual disimpactions at home)  - renal/bladder US no hydronephrosis

## 2019-09-20 NOTE — PROGRESS NOTE ADULT - PROBLEM SELECTOR PLAN 1
Since presentation:  s/p EGD by Dr. Ambrocio on 9/5 which showed acute gastritis, gastric polyps with no source of GI bleed identified  s/p enteroscopy 9/10- jejunum normal. small hiatal hernia. gastritis neg for h pylori  s/p EGD 9/13: No active bleeding or stigmata of bleeding.   s/p EGD 9/17: 3 hour endoscopy, blood in the entire examined duodenum and interfered with adequate visualization. Procedure aborted.  CTA 9/18: No definite extravasation. Severe stenosis of celiac ostium. Hypertrophy of the distal branches of celiac trunk and multiple collaterals are again noted.  - Continue PPI  - Hgb trend is from 10-->9-->8. We will continue with q 12 hour hgb check. Hemodynamically stable. She does not appear to be bleeding at present.   - If opens up again, will d/w with GI; next steps tagged red cell scan or direct to IR for embo

## 2019-09-20 NOTE — CONSULT NOTE ADULT - SUBJECTIVE AND OBJECTIVE BOX
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 after suffering UGIB. Patient has complicated medical history and recent medical course not full reflected on chart review, but collateral obtained from . Patient was at home in early August 2019 when  though she was not mentating well. He took her vitals at that time and found her to have a systolic BP in the 70s and HR in the 150s. When he brought her to [facility] they found her Hg to be 4.4. Unknown if she was having melena or hematochezia at that time. While she was there patient underwent four EGDs and received more than 10 units PRBC. EGDs were unable to identify active source of bleeding and usually showed pooled blood. There is suspicion for dieulafoy, but unable to be acted upon at [place] with the multiple EGDs at times only showing adherent clot. She was evaluated by surgery who recommended ex-lap given the severity, but patient's family deferred for now. Patient spent a majority of her time in [place] at the ICU. CT angiogram could not identify active source of bleeding with IR evaluation stating they could not provide a solution given the patient's previous dissection and collateral arterial formation. Patient's PMD is Dr. Branham who recommended transfer to [facility] for evaluation by Dr. Ambrocio. When seen by me patient had no active complaints. She states her Baclofen pump is empty. She denies any hematochezia or melena. She is slightly fretful given the recent medical events and is hopeful that a solution can be found. (04 Sep 2019 20:17)      PAST MEDICAL & SURGICAL HISTORY:  Dorsalgia of lumbar region: on pain medication /baclofen po and pump  Self-catheterizes urinary bladder  Anemia: chronic  Uveitis  Osteoporosis  PAD (peripheral artery disease)  Hematoma: spinal  September  treated  September 2018  Paraplegia: on wheelchair goes to physical therapy 2 x weekly  Aortic dissection, thoracic: Type A Repaired 2009  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2018  UTI (urinary tract infection): stable x 3 months  TIA (transient ischemic attack)  HTN (Hypertension): on meds  History of corneal transplant: left corneal transplant on 5/21/2018  Disorder of spine: unthetethering 2 x  Presence of IVC filter: 2014 ?  S/P aortic dissection repair: Type A Dissection repair /2009   descending aortic aneurysm repair 9/2016  H/O Spinal surgery: laminectomies 2014      REVIEW OF SYSTEMS      General:	    Skin/Breast:  	  Ophthalmologic:  	  ENMT:	    Respiratory and Thorax:  	  Cardiovascular:	    Gastrointestinal:	    Genitourinary:	    Musculoskeletal:	    Neurological:	    Psychiatric:	    Hematology/Lymphatics:	    Endocrine:	    Allergic/Immunologic:	  Pt unable to offer  Skin/ MSK: see HPI  All other systems negative    MEDICATIONS  (STANDING):  artificial tears (preservative free) Ophthalmic Solution 1 Drop(s) Left EYE every 2 hours  ascorbic acid 500 milliGRAM(s) Oral daily  atorvastatin 40 milliGRAM(s) Oral at bedtime  chlorhexidine 4% Liquid 1 Application(s) Topical <User Schedule>  DULoxetine 30 milliGRAM(s) Oral two times a day  gabapentin   Solution 800 milliGRAM(s) Oral three times a day  labetalol 200 milliGRAM(s) Oral two times a day  lidocaine   Patch 1 Patch Transdermal daily  pantoprazole  Injectable 40 milliGRAM(s) IV Push every 12 hours  prednisoLONE acetate 1% Suspension 1 Drop(s) Left EYE four times a day  sodium chloride 2% Ophthalmic Solution 1 Drop(s) Left EYE three times a day  valACYclovir 1000 milliGRAM(s) Oral three times a day    MEDICATIONS  (PRN):  ondansetron Injectable 4 milliGRAM(s) IV Push every 6 hours PRN Nausea and/or Vomiting  oxyCODONE    IR 10 milliGRAM(s) Oral three times a day PRN Moderate Pain (4 - 6)  zolpidem 5 milliGRAM(s) Oral at bedtime PRN Insomnia      Allergies    No Known Allergies    Intolerances        SOCIAL HISTORY:  / /single/ ; (+)HHA/ lives in SNF; Former smoker, Denies smoking, ETOH, drugs    FAMILY HISTORY:  Family history of Alzheimer's disease      Vital Signs Last 24 Hrs  T(C): 36.4 (20 Sep 2019 08:00), Max: 36.7 (20 Sep 2019 00:09)  T(F): 97.5 (20 Sep 2019 08:00), Max: 98.1 (20 Sep 2019 00:09)  HR: 72 (20 Sep 2019 08:00) (67 - 78)  BP: 163/72 (20 Sep 2019 08:00) (130/65 - 164/66)  BP(mean): 88 (19 Sep 2019 12:00) (88 - 88)  RR: 18 (20 Sep 2019 08:00) (18 - 19)  SpO2: 96% (20 Sep 2019 08:00) (93% - 98%)    NAD / gaurded but stable,  A&Ox3/ Alert/ Confused  cachectic/ MO/ Obese/ frail  WD/ WN/ WG/ Disheveled  Total Care Sport/ Versa Care P500 bed/ [place]    Cardiovascular: RRR (+)m    Respiratory: CTA    Gastrointestinal soft NT/ND (+)BS  (+)PEG (+)ostomy    Neurology  weakened strength & sensation grossly intact/ paraesthesia  nonverbal, no follow commands/ paraplegic    Musculoskeletal/Vascular:  ROM  >LE //BLE edema equal  DP/PT pulses palpable  BLE equally warm/ cool  no acute ischemia noted  hemosiderin staining  no deformities/ contractures    Skin:  moist w/ good turgor  frail,  ecchymosis w/o hematoma  serosanguinous drainage  No odor, erythema, increased warmth, tenderness, induration, fluctuance    LABS:  09-20    143  |  106  |  16  ----------------------------<  101<H>  3.8   |  26  |  0.48<L>    Ca    8.7      20 Sep 2019 06:46  Phos  2.7     09-20  Mg     2.0     09-20    TPro  4.9<L>  /  Alb  2.5<L>  /  TBili  0.3  /  DBili  x   /  AST  16  /  ALT  11  /  AlkPhos  82  09-20                          8.3    7.39  )-----------( 199      ( 20 Sep 2019 08:57 )             26.2     PT/INR - ( 20 Sep 2019 09:33 )   PT: 11.3 sec;   INR: 0.98 ratio         PTT - ( 20 Sep 2019 09:33 )  PTT:32.7 sec      RADIOLOGY & ADDITIONAL STUDIES:  Cultures: Wound SURGERY CONSULT NOTE    HPI:  68F with PMHx of aortic dissection (post-repair several years prior), spinal cord hematoma (now functionally paraplegic, she can move her left leg), chronic spasticity and pain (on PRN Oxycodone and has Baclofen pump), HTN, nephrolithiasis s/p left urethral stent and endotheliitis/keratitis (post-corneal transplant) transferred from [facility] after suffering UGIB. Patient has complicated medical history and recent medical course not full reflected on chart review, but collateral obtained from . Patient was at home in early August 2019 when  though she was not mentating well. He took her vitals at that time and found her to have a systolic BP in the 70s and HR in the 150s. When he brought her to [facility] they found her Hg to be 4.4. Unknown if she was having melena or hematochezia at that time. While she was there patient underwent four EGDs and received more than 10 units PRBC. EGDs were unable to identify active source of bleeding and usually showed pooled blood. There is suspicion for dieulafoy, but unable to be acted upon at [place] with the multiple EGDs at times only showing adherent clot. She was evaluated by surgery who recommended ex-lap given the severity, but patient's family deferred for now. Patient spent a majority of her time in [place] at the ICU. CT angiogram could not identify active source of bleeding with IR evaluation stating they could not provide a solution given the patient's previous dissection and collateral arterial formation. Patient's PMD is Dr. Branham who recommended transfer to [facility] for evaluation by Dr. Ambrocio. When seen by me patient had no active complaints. She states her Baclofen pump is empty. She denies any hematochezia or melena. She is slightly fretful given the recent medical events and is hopeful that a solution can be found.     Ms. Portillo was encountered on a alternating air with low air loss surface resting comfortably in a Left side lying position. She reported a history of a healed stage 4 pressure injury and that her  straight caths her for urine. However, she was grossly incontinent of urine at this assessment. She further reported that she has a wheelchair cushion and air mattress for her bed at home.    PAST MEDICAL & SURGICAL HISTORY:  Dorsalgia of lumbar region: on pain medication /baclofen po and pump  Self-catheterizes urinary bladder  Anemia: chronic  Uveitis  Osteoporosis  PAD (peripheral artery disease)  Hematoma: spinal  September  treated  September 2018  Paraplegia: on wheelchair goes to physical therapy 2 x weekly  Aortic dissection, thoracic: Type A Repaired 2009  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2018  UTI (urinary tract infection): stable x 3 months  TIA (transient ischemic attack)  HTN (Hypertension): on meds  History of corneal transplant: left corneal transplant on 5/21/2018  Disorder of spine: unthetethering 2 x  Presence of IVC filter: 2014 ?  S/P aortic dissection repair: Type A Dissection repair /2009   descending aortic aneurysm repair 9/2016  H/O Spinal surgery: laminectomies 2014    REVIEW OF SYSTEMS  General: no fever or chills, recent AMS  Respiratory and Thorax: no SOB or cough  Cardiovascular:	no CP  Gastrointestinal:	incontinent of stool  Genitourinary: incontinent of urine	  Musculoskeletal:	 wheelchair bound  Neurological:	paraplegic  Hematology/Lymphatics:	 Hgb 4.4 at HH, h/o of spinal hematoma  Skin: h/o sacral stage 4 pressure injury	    MEDICATIONS  (STANDING):  artificial tears (preservative free) Ophthalmic Solution 1 Drop(s) Left EYE every 2 hours  ascorbic acid 500 milliGRAM(s) Oral daily  atorvastatin 40 milliGRAM(s) Oral at bedtime  chlorhexidine 4% Liquid 1 Application(s) Topical <User Schedule>  DULoxetine 30 milliGRAM(s) Oral two times a day  gabapentin   Solution 800 milliGRAM(s) Oral three times a day  labetalol 200 milliGRAM(s) Oral two times a day  lidocaine   Patch 1 Patch Transdermal daily  pantoprazole  Injectable 40 milliGRAM(s) IV Push every 12 hours  prednisoLONE acetate 1% Suspension 1 Drop(s) Left EYE four times a day  sodium chloride 2% Ophthalmic Solution 1 Drop(s) Left EYE three times a day  valACYclovir 1000 milliGRAM(s) Oral three times a day    MEDICATIONS  (PRN):  ondansetron Injectable 4 milliGRAM(s) IV Push every 6 hours PRN Nausea and/or Vomiting  oxyCODONE    IR 10 milliGRAM(s) Oral three times a day PRN Moderate Pain (4 - 6)  zolpidem 5 milliGRAM(s) Oral at bedtime PRN Insomnia    Allergies    No Known Allergies    Intolerances    SOCIAL HISTORY:  , lives with spouse    FAMILY HISTORY:  Family history of Alzheimer's disease    Vital Signs Last 24 Hrs  T(C): 36.4 (20 Sep 2019 08:00), Max: 36.7 (20 Sep 2019 00:09)  T(F): 97.5 (20 Sep 2019 08:00), Max: 98.1 (20 Sep 2019 00:09)  HR: 72 (20 Sep 2019 08:00) (67 - 78)  BP: 163/72 (20 Sep 2019 08:00) (130/65 - 164/66)  BP(mean): 88 (19 Sep 2019 12:00) (88 - 88)  RR: 18 (20 Sep 2019 08:00) (18 - 19)  SpO2: 96% (20 Sep 2019 08:00) (93% - 98%)    Physical Exam:  General: NAD, WN/ WG  Respiratory: no SOB on room air  Gastrointestinal: soft NT/ND, no bleeding per rectum on exam  Neurology: paraplegic  Musculoskeletal: no contractures  Vascular: no BLE edema, BLE equally warm, no acute ischemia noted  Skin:  sacral skin intact but macerated, no drainage  No odor, erythema, increased warmth, tenderness, induration, fluctuance    LABS:  09-20    143  |  106  |  16  ----------------------------<  101<H>  3.8   |  26  |  0.48<L>    Ca    8.7      20 Sep 2019 06:46  Phos  2.7     09-20  Mg     2.0     09-20    TPro  4.9<L>  /  Alb  2.5<L>  /  TBili  0.3  /  DBili  x   /  AST  16  /  ALT  11  /  AlkPhos  82  09-20                          8.3    7.39  )-----------( 199      ( 20 Sep 2019 08:57 )             26.2     PT/INR - ( 20 Sep 2019 09:33 )   PT: 11.3 sec;   INR: 0.98 ratio         PTT - ( 20 Sep 2019 09:33 )  PTT:32.7 sec

## 2019-09-20 NOTE — CHART NOTE - NSCHARTNOTEFT_GEN_A_CORE
Pt seen for nutrition follow up. Pt admitted with GIB S/P MICU stay.     Source: Patient [ x]    Family [ ]     other [ ]    Diet : Clear liquid diet      Patient denies N+V, last BM noted 9/18     PO intake: Pt had been NPO 9/17-9/19 with diet advanced to clears yesterday, pt reports tolerating clear liquid diet well thus far will take jello and broth, does not like apple juice and dislikes promote supplement. Pt reports prior to this her appetite and intake had been better, pt unable to recall if she enjoyed mighty shakes.       Current Weight: 122.1 lbs (9/17), 121.9 lbs (9/18), weight stable.   % Weight Change    Pertinent Medications: MEDICATIONS  (STANDING):  artificial tears (preservative free) Ophthalmic Solution 1 Drop(s) Left EYE every 2 hours  ascorbic acid 500 milliGRAM(s) Oral daily  atorvastatin 40 milliGRAM(s) Oral at bedtime  chlorhexidine 4% Liquid 1 Application(s) Topical <User Schedule>  DULoxetine 30 milliGRAM(s) Oral two times a day  gabapentin   Solution 800 milliGRAM(s) Oral three times a day  labetalol 200 milliGRAM(s) Oral two times a day  lidocaine   Patch 1 Patch Transdermal daily  pantoprazole  Injectable 40 milliGRAM(s) IV Push every 12 hours  prednisoLONE acetate 1% Suspension 1 Drop(s) Left EYE four times a day  sodium chloride 2% Ophthalmic Solution 1 Drop(s) Left EYE three times a day  valACYclovir 1000 milliGRAM(s) Oral three times a day    MEDICATIONS  (PRN):  ondansetron Injectable 4 milliGRAM(s) IV Push every 6 hours PRN Nausea and/or Vomiting  oxyCODONE    IR 10 milliGRAM(s) Oral three times a day PRN Moderate Pain (4 - 6)  zolpidem 5 milliGRAM(s) Oral at bedtime PRN Insomnia    Pertinent Labs:  09-20 Na143 mmol/L Glu 101 mg/dL<H> K+ 3.8 mmol/L Cr  0.48 mg/dL<L> BUN 16 mg/dL 09-20 Phos 2.7 mg/dL 09-20 Alb 2.5 g/dL<L>      Skin: 1+ R leg, 2+ L Leg/foot and R hand edema, skin noted with stage 2 pressure injury to sacrum     Estimated Needs:   [ ] no change since previous assessment  [ x] recalculated: based on recent weight 55.4 Kg (9/18)  9306-7472 Kcal (30-35 Kcal/Kg)  66-78g protein (1.2-1.4g/Kg)      Previous Nutrition Diagnosis:     [x ] Inadequate Oral Intake          Nutrition Diagnosis is [x ] ongoing, to be addressed with supplements          New Nutrition Diagnosis: [x ] not applicable       Interventions:     Recommend    1. Diet advancement deferred to medical team, if plan to continue with clear liquids consider adding ensure clear 2 daily (240 Kcal, 8g protein per 8 oz serving),   2. Continue vitamin C, consider adding daily multivitamin to further aid in wound healing  3. Continue to encourage po intake and obtain/honor food preferences as able        Monitoring and Evaluation:     [ x] PO intake [ x] Tolerance to diet prescription [x ] weights [ x] follow up per protocol    [ ] other:

## 2019-09-20 NOTE — PROGRESS NOTE ADULT - SUBJECTIVE AND OBJECTIVE BOX
Chief Complaint:  Patient is a 68y old  Female who presents with a chief complaint of Transfer from [facility] for UGIB.    Interval HPI:    68F with PMHx of aortic dissection repair, functional paraplegia 2/2 spinal cord hematoma, chronic spasticity and pain, Baclofen pump, Spinal cord stimulator, HTN, nephrolithiasis s/p left urethral stent and endotheliitis/keratitis (post-corneal transplant) transferred from [facility] after suffering UGIB requiring several PRBCs. Unable to identify source of bleed, Patient's PMD is Dr. Branham who recommended transfer to [facility] for evaluation by Dr. Ambrocio.   9/17 active, symptomatic GIB. EGD performed, unable to locate source, transferred to MICU intubated. Pt extubated while in MICU and transferred to floor.    Has a H/O dorsalgia and chronic spasticity, and has a [employer]tronic intrathecal infusion device in place infusing Baclofen and Morphine.   Community pain management specialist Dr Vieira in [place].    Pump refilled while in MICU on 9/18.   To be refilled before 12/1/2019.    Pt sitting in chair, A&Ox3, Good concentration, NAD. Pt in good spirits, happy to be out of bed and in chair. Still having RLE discomfort but similar to chronic pain prior to admission. Denies SOB, dizziness, CP, palpitations. Follows commands, responding to questions appropriately.    Vital Signs Last 24 Hrs  T(C): 36.4 (20 Sep 2019 08:00)  T(F): 97.5 (20 Sep 2019 08:00)  HR: 89 (20 Sep 2019 13:40)  BP: 136/67 (20 Sep 2019 13:40)  RR: 18 (20 Sep 2019 08:00)  SpO2: 96% (20 Sep 2019 08:00)    Allergies    No Known Allergies    MEDICATIONS  (STANDING):  artificial tears (preservative free) Ophthalmic Solution 1 Drop(s) Left EYE every 2 hours  ascorbic acid 500 milliGRAM(s) Oral daily  atorvastatin 40 milliGRAM(s) Oral at bedtime  chlorhexidine 4% Liquid 1 Application(s) Topical <User Schedule>  DULoxetine 30 milliGRAM(s) Oral two times a day  gabapentin   Solution 800 milliGRAM(s) Oral three times a day  labetalol 200 milliGRAM(s) Oral two times a day  lidocaine   Patch 1 Patch Transdermal daily  pantoprazole  Injectable 40 milliGRAM(s) IV Push every 12 hours  prednisoLONE acetate 1% Suspension 1 Drop(s) Left EYE four times a day  sodium chloride 2% Ophthalmic Solution 1 Drop(s) Left EYE three times a day  valACYclovir 1000 milliGRAM(s) Oral three times a day    MEDICATIONS  (PRN):  ondansetron Injectable 4 milliGRAM(s) IV Push every 6 hours PRN Nausea and/or Vomiting  oxyCODONE    IR 10 milliGRAM(s) Oral three times a day PRN Moderate Pain (4 - 6)  zolpidem 5 milliGRAM(s) Oral at bedtime PRN Insomnia

## 2019-09-20 NOTE — PROGRESS NOTE ADULT - SUBJECTIVE AND OBJECTIVE BOX
Sylvia Monterroso MD  Attending Physician (Hospitalist)  pager: [phone]    After 5PM please contact Hospitalist Pager [phone]    Patient is a 68y old  Female who presents with a chief complaint of Transfer from [facility] for UGIB (20 Sep 2019 12:36)        SUBJECTIVE / OVERNIGHT EVENTS:  No acute events overnight. Patient complaining of abdominal pain this morning, which resolved with straight catheterization. She has had no further episodes of melena, BRBPR, N/V/D.     MEDICATIONS  (STANDING):  artificial tears (preservative free) Ophthalmic Solution 1 Drop(s) Left EYE every 2 hours  ascorbic acid 500 milliGRAM(s) Oral daily  atorvastatin 40 milliGRAM(s) Oral at bedtime  chlorhexidine 4% Liquid 1 Application(s) Topical <User Schedule>  DULoxetine 30 milliGRAM(s) Oral two times a day  gabapentin   Solution 800 milliGRAM(s) Oral three times a day  labetalol 200 milliGRAM(s) Oral two times a day  lidocaine   Patch 1 Patch Transdermal daily  pantoprazole  Injectable 40 milliGRAM(s) IV Push every 12 hours  prednisoLONE acetate 1% Suspension 1 Drop(s) Left EYE four times a day  sodium chloride 2% Ophthalmic Solution 1 Drop(s) Left EYE three times a day  valACYclovir 1000 milliGRAM(s) Oral three times a day    MEDICATIONS  (PRN):  ondansetron Injectable 4 milliGRAM(s) IV Push every 6 hours PRN Nausea and/or Vomiting  oxyCODONE    IR 10 milliGRAM(s) Oral three times a day PRN Moderate Pain (4 - 6)  zolpidem 5 milliGRAM(s) Oral at bedtime PRN Insomnia      Vital Signs Last 24 Hrs  T(C): 36.4 (20 Sep 2019 08:00), Max: 36.7 (20 Sep 2019 00:09)  T(F): 97.5 (20 Sep 2019 08:00), Max: 98.1 (20 Sep 2019 00:09)  HR: 72 (20 Sep 2019 08:00) (67 - 78)  BP: 163/72 (20 Sep 2019 08:00) (137/67 - 164/66)  BP(mean): --  RR: 18 (20 Sep 2019 08:00) (18 - 19)  SpO2: 96% (20 Sep 2019 08:00) (93% - 97%)  CAPILLARY BLOOD GLUCOSE        I&O's Summary    19 Sep 2019 07:01  -  20 Sep 2019 07:00  --------------------------------------------------------  IN: 650 mL / OUT: 880 mL / NET: -230 mL    20 Sep 2019 07:01  -  20 Sep 2019 13:20  --------------------------------------------------------  IN: 0 mL / OUT: 500 mL / NET: -500 mL          PHYSICAL EXAM  GENERAL: Chronically ill appearing female, in no acute distress  HEAD:  Atraumatic, Normocephalic  EYES: Corneal transplant changes appreciated. Conjunctivitis resolving. Pale conjunctiva and sclera clear  CHEST/LUNG: Clear to auscultation bilaterally; No wheeze  HEART: Regular rate and rhythm; No murmurs, rubs, or gallops  ABDOMEN: Soft, Nontender, Nondistended; Bowel sounds present  EXTREMITIES:  2+ Peripheral Pulses, No clubbing, cyanosis, or edema  PSYCH: AAOx3, appropriate mood and affect.  SKIN: No rashes or lesions    LABS:                        8.3    7.39  )-----------( 199      ( 20 Sep 2019 08:57 )             26.2     09-20    143  |  106  |  16  ----------------------------<  101<H>  3.8   |  26  |  0.48<L>    Ca    8.7      20 Sep 2019 06:46  Phos  2.7     09-20  Mg     2.0     09-20    TPro  4.9<L>  /  Alb  2.5<L>  /  TBili  0.3  /  DBili  x   /  AST  16  /  ALT  11  /  AlkPhos  82  09-20    PT/INR - ( 20 Sep 2019 09:33 )   PT: 11.3 sec;   INR: 0.98 ratio         PTT - ( 20 Sep 2019 09:33 )  PTT:32.7 sec            RADIOLOGY & ADDITIONAL TESTS:    Imaging Personally Reviewed:  Consultant(s) Notes Reviewed:  GI , ID  Care Discussed with Consultants/Other Providers: Jaclyn VELÁSQUEZ

## 2019-09-20 NOTE — PROGRESS NOTE ADULT - ASSESSMENT
68F with PMHx of aortic dissection repair, functional paraplegia 2/2 spinal cord hematoma, chronic spasticity and pain, Baclofen pump, Spinal cord stimulator, HTN, nephrolithiasis s/p left urethral stent and endotheliitis/keratitis (post-corneal transplant) transferred from [facility] after suffering UGIB requiring several PRBCs. Unable to identify source of bleed, Patient's PMD is Dr. Branham who recommended transfer to [facility] for evaluation by Dr. Ambrocio.   9/17 active, symptomatic GIB. EGD performed, unable to locate source, transferred to MICU intubated. Pt extubated while in MICU and transferred to floor.    Has a H/O dorsalgia and chronic spasticity, and has a [employer]tronic intrathecal infusion device in place infusing Baclofen and Morphine.   Community pain management specialist Dr Vieira in [place]. Has a spinal cord stimulator in place, not currently on.    Continue oxycodone, gabapentin, duloxetine, and lidoderm.  Pump refilled while in MICU on 9/18  To be refilled before 12/1/2019  Pt to discuss peripheral nerve block for Meralgia Paresthetica with out-pt Pain MD during follow up after discharge    Minutes spent on encounter:  15 minutes.      [facility]  [phone]

## 2019-09-21 NOTE — PROGRESS NOTE ADULT - ASSESSMENT
Recurrent GI bleeding likely gastric source now stable and without signs of active bleeding.      Rec    PPI   Can advance diet to regular  daily cbc.

## 2019-09-21 NOTE — PROGRESS NOTE ADULT - SUBJECTIVE AND OBJECTIVE BOX
Patient is a 68y old  Female who presents with a chief complaint of Transfer from [facility] for UGIB (20 Sep 2019 14:13)      SUBJECTIVE / OVERNIGHT EVENTS:  Tolerating diet.   No overt sign of bleeding.   Stable H and H     MEDICATIONS  (STANDING):  artificial tears (preservative free) Ophthalmic Solution 1 Drop(s) Left EYE every 2 hours  ascorbic acid 500 milliGRAM(s) Oral daily  atorvastatin 40 milliGRAM(s) Oral at bedtime  chlorhexidine 4% Liquid 1 Application(s) Topical <User Schedule>  DULoxetine 30 milliGRAM(s) Oral two times a day  gabapentin 800 milliGRAM(s) Oral three times a day  labetalol 200 milliGRAM(s) Oral two times a day  lidocaine   Patch 1 Patch Transdermal daily  pantoprazole  Injectable 40 milliGRAM(s) IV Push every 12 hours  prednisoLONE acetate 1% Suspension 1 Drop(s) Left EYE four times a day  sodium chloride 2% Ophthalmic Solution 1 Drop(s) Left EYE three times a day  valACYclovir 1000 milliGRAM(s) Oral three times a day    MEDICATIONS  (PRN):  ondansetron Injectable 4 milliGRAM(s) IV Push every 6 hours PRN Nausea and/or Vomiting  oxyCODONE    IR 10 milliGRAM(s) Oral three times a day PRN Moderate Pain (4 - 6)  zolpidem 5 milliGRAM(s) Oral at bedtime PRN Insomnia      CAPILLARY BLOOD GLUCOSE        I&O's Summary    20 Sep 2019 07:01  -  21 Sep 2019 07:00  --------------------------------------------------------  IN: 0 mL / OUT: 2540 mL / NET: -2540 mL        PHYSICAL EXAM:  Vital Signs Last 24 Hrs  T(C): 36.6 (21 Sep 2019 08:00), Max: 36.6 (20 Sep 2019 23:55)  T(F): 97.8 (21 Sep 2019 08:00), Max: 97.8 (20 Sep 2019 23:55)  HR: 74 (21 Sep 2019 08:00) (74 - 89)  BP: 145/68 (21 Sep 2019 08:00) (136/67 - 159/77)  BP(mean): --  RR: 18 (21 Sep 2019 08:00) (18 - 18)  SpO2: 97% (21 Sep 2019 08:00) (95% - 97%)  GENERAL: NAD, well-developed  HEAD:  Atraumatic, Normocephalic  EYES: EOMI, PERRLA, conjunctiva and sclera clear  NECK: Supple, No JVD  CHEST/LUNG: Clear to auscultation bilaterally; No wheeze  HEART: Regular rate and rhythm; No murmurs, rubs, or gallops  ABDOMEN: Soft, Nontender, Nondistended; Bowel sounds present  EXTREMITIES:  2+ Peripheral Pulses, No clubbing, cyanosis, or edema  PSYCH: AAOx3  NEUROLOGY: non-focal  SKIN: No rashes or lesions    LABS:                        9.3    8.0   )-----------( 281      ( 21 Sep 2019 06:54 )             28.0     09-20    143  |  106  |  16  ----------------------------<  101<H>  3.8   |  26  |  0.48<L>    Ca    8.7      20 Sep 2019 06:46  Phos  2.7     09-20  Mg     2.0     09-20    TPro  4.9<L>  /  Alb  2.5<L>  /  TBili  0.3  /  DBili  x   /  AST  16  /  ALT  11  /  AlkPhos  82  09-20    PT/INR - ( 20 Sep 2019 09:33 )   PT: 11.3 sec;   INR: 0.98 ratio         PTT - ( 20 Sep 2019 09:33 )  PTT:32.7 sec          RADIOLOGY & ADDITIONAL TESTS:    Imaging Personally Reviewed:    Consultant(s) Notes Reviewed:      Care Discussed with Consultants/Other Providers:

## 2019-09-21 NOTE — PROVIDER CONTACT NOTE (OTHER) - SITUATION
Patient ST=[id number] for pre blood transfusion vitals
Patient c/o left hand weakness, stating her hand keeps dropping and she is having tremors.
Patient w/ temperature of 100.8
Patient's BP 77/51
Pt 
Pt glucose id [id number]
Pt has temperature elevated of 101.6
Pt having unchanged temperature of 100.8
patient with black, tarry stool
pt requesting to be straight cath'd after 2 hours of being straight cath'd.   bladder scan- greater than 500 ml.
admitted with diagnosis of GIB

## 2019-09-21 NOTE — PROGRESS NOTE ADULT - SUBJECTIVE AND OBJECTIVE BOX
[facility] GI coverage for Dr Donell Ambrocio      No events overnight.   She did not have a BM overnight.         Review of Systems:    General:  No wt loss, fevers, chills, night sweats,fatigue,   CV:  No pain, palpitatioins, hypo/hypertension  Resp:  No dyspnea, cough, tachypnea, wheezing  GI:  No pain, No nausea, No vomiting, No diarrhea, No constipatiion, No weight loss, No fever, No pruritis, No rectal bleeding, No tarry stools, No dysphagia,  :  No pain, bleeding, incontinence, nocturia  Muscle:  No pain, weakness  Neuro:  No weakness, tingling, memory problems  Psych:  No fatigue, insomnia, mood problems, depression  Endocrine:  No polyuria, polydypsia, cold/heat intolerance  Heme:  No petechiae, ecchymosis, easy bruisability  Skin:  No rash, tattoos, scars, edema      Vital Signs Last 24 Hrs  T(C): 36.6 (21 Sep 2019 08:00), Max: 36.6 (20 Sep 2019 23:55)  T(F): 97.8 (21 Sep 2019 08:00), Max: 97.8 (20 Sep 2019 23:55)  HR: 74 (21 Sep 2019 08:00) (74 - 89)  BP: 145/68 (21 Sep 2019 08:00) (136/67 - 159/77)  BP(mean): --  RR: 18 (21 Sep 2019 08:00) (18 - 18)  SpO2: 97% (21 Sep 2019 08:00) (95% - 97%)    PHYSICAL EXAM:    Constitutional: NAD, well-developed  Neck: No LAD, supple  Respiratory: CTA and P  Cardiovascular: S1 and S2, RRR, no M  Gastrointestinal: BS+, soft, NT/ND, neg HSM,  Extremities: No peripheral edema, neg clubing, cyanosis  Vascular: 2+ peripheral pulses  Neurological: A/O x 3, no focal deficits  Psychiatric: Normal mood, normal affect  Skin: No rashes    MEDICATIONS  (STANDING):  artificial tears (preservative free) Ophthalmic Solution 1 Drop(s) Left EYE every 2 hours  ascorbic acid 500 milliGRAM(s) Oral daily  atorvastatin 40 milliGRAM(s) Oral at bedtime  chlorhexidine 4% Liquid 1 Application(s) Topical <User Schedule>  DULoxetine 30 milliGRAM(s) Oral two times a day  gabapentin 800 milliGRAM(s) Oral three times a day  labetalol 200 milliGRAM(s) Oral two times a day  lidocaine   Patch 1 Patch Transdermal daily  pantoprazole  Injectable 40 milliGRAM(s) IV Push every 12 hours  prednisoLONE acetate 1% Suspension 1 Drop(s) Left EYE four times a day  sodium chloride 2% Ophthalmic Solution 1 Drop(s) Left EYE three times a day  valACYclovir 1000 milliGRAM(s) Oral three times a day    MEDICATIONS  (PRN):  ondansetron Injectable 4 milliGRAM(s) IV Push every 6 hours PRN Nausea and/or Vomiting  oxyCODONE    IR 10 milliGRAM(s) Oral three times a day PRN Moderate Pain (4 - 6)  zolpidem 5 milliGRAM(s) Oral at bedtime PRN Insomnia      Allergies    No Known Allergies    Intolerances          LABS:                        9.3    8.0   )-----------( 281      ( 21 Sep 2019 06:54 )             28.0                         8.7    8.0   )-----------( 235      ( 21 Sep 2019 01:19 )             25.2                         9.0    9.4   )-----------( 282      ( 20 Sep 2019 19:46 )             27.8     09-20    143  |  106  |  16  ----------------------------<  101<H>  3.8   |  26  |  0.48<L>    Ca    8.7      20 Sep 2019 06:46  Phos  2.7     09-20  Mg     2.0     09-20    TPro  4.9<L>  /  Alb  2.5<L>  /  TBili  0.3  /  DBili  x   /  AST  16  /  ALT  11  /  AlkPhos  82  09-20    PT/INR - ( 20 Sep 2019 09:33 )   PT: 11.3 sec;   INR: 0.98 ratio         PTT - ( 20 Sep 2019 09:33 )  PTT:32.7 sec    LIVER FUNCTIONS - ( 20 Sep 2019 06:46 )  Alb: 2.5 g/dL / Pro: 4.9 g/dL / ALK PHOS: 82 U/L / ALT: 11 U/L / AST: 16 U/L / GGT: x         LIVER FUNCTIONS - ( 19 Sep 2019 00:10 )  Alb: 2.3 g/dL / Pro: 4.8 g/dL / ALK PHOS: 77 U/L / ALT: 7 U/L / AST: 12 U/L / GGT: x               RADIOLOGY & ADDITIONAL TESTS:

## 2019-09-21 NOTE — PROVIDER CONTACT NOTE (OTHER) - BACKGROUND
Patient admitted for GI bleed. Hx of HTN, Anemia, UTI.
Patient is a 68 year old female admitted with GI bleed. Patient has 1 unit of PRBCs pending for hgb=6.8. Patient has been tachycardic at times in the past.
Patient is a 69 yo female admitted with GIB. PMH includes paraplegia, HTN, Dorsalgia, left eye blindness.
Pt admitted w/ GI Bleed
Pt is a GI bleed, intubated and sedated. NPO
patient admitted with GI hemorrage, s/p endoscopy and clips 9/6/19. Patient has hemiplegia
pt is a functional quadriplegic.
history of paraphlegia--htn--dorsalgia of lumbbar regianemia==hematoma--UTI--blindness left eye--PAD--TIA
Pt admitted w/ GI Bleed

## 2019-09-21 NOTE — PROVIDER CONTACT NOTE (OTHER) - ACTION/TREATMENT ORDERED:
EKG done as ordered-- tylenol given as ordered-- bladder scan as ordered-- CBC stat
Tylenol administered and cooling blanket applied to pt.
one time order for straight cath
proceed with 1unit PRBCs blood transfusion, continue to monitor with vitals
stat cbc, NPO, continue to monitor
500 ml Bolus given, continue to monitor.
CT Head to r/o bleed
Continue to monitor. Check in 6 hours
Perform EKG, Give tylenol PO. Continue to monitor.
Tylenol administered as ordered
recheck temperature in 30 mins

## 2019-09-21 NOTE — PROGRESS NOTE ADULT - ASSESSMENT
67 y/o F with PMHx of aortic dissection (post-repair several years prior), spinal cord hematoma (now functionally paraplegic), chronic spasticity and pain (on Oxycodone and Baclofen pump), HTN, nephrolithiasis s/p left urethral stent, UTIs and endotheliitis/keratitis (post-corneal transplant) presented as a transfer from [facility] for UGIB with acute blood loss anemia requiring multiple PRBC transfusions for further management of GI bleed. Work up prior to presentation included CTA A/P which was unable to localize source of bleed, abd multiple EGD's which showed pooled blood and/or adherent clot in gastric fundus that could not be removed. Patient transferred here for further management and evaluation by GI, she has undergone 4 endoscopic evaluations, and capsule study without clear source of bleeding. She briefly required MICU stay for urgent endoscopic evaluation in the setting of bleed, but has since been transferred back to medicine for further management.

## 2019-09-22 NOTE — PROGRESS NOTE ADULT - SUBJECTIVE AND OBJECTIVE BOX
[facility] GI coverage for Dr Donell Ambrocio    No events.   Tolerating regular diet.    Had a brown BM.   No rectal bleeding.        Review of Systems:    General:  No wt loss, fevers, chills, night sweats,fatigue,   CV:  No pain, palpitatioins, hypo/hypertension  Resp:  No dyspnea, cough, tachypnea, wheezing  GI:  No pain, No nausea, No vomiting, No diarrhea, No constipatiion, No weight loss, No fever, No pruritis, No rectal bleeding, No tarry stools, No dysphagia,  :  No pain, bleeding, incontinence, nocturia  Muscle:  No pain, weakness  Neuro:  No weakness, tingling, memory problems  Psych:  No fatigue, insomnia, mood problems, depression  Endocrine:  No polyuria, polydypsia, cold/heat intolerance  Heme:  No petechiae, ecchymosis, easy bruisability  Skin:  No rash, tattoos, scars, edema      Vital Signs Last 24 Hrs  T(C): 36.9 (22 Sep 2019 09:00), Max: 37.7 (22 Sep 2019 00:19)  T(F): 98.4 (22 Sep 2019 09:00), Max: 99.8 (22 Sep 2019 00:19)  HR: 82 (22 Sep 2019 09:00) (82 - 105)  BP: 104/51 (22 Sep 2019 09:00) (104/51 - 152/79)  BP(mean): --  RR: 18 (22 Sep 2019 09:00) (18 - 18)  SpO2: 93% (22 Sep 2019 09:00) (93% - 96%)    PHYSICAL EXAM:    Constitutional: NAD, well-developed  Neck: No LAD, supple  Respiratory: CTA and P  Cardiovascular: S1 and S2, RRR, no M  Gastrointestinal: BS+, soft, NT/ND, neg HSM,  Extremities: No peripheral edema, neg clubing, cyanosis  Vascular: 2+ peripheral pulses  Neurological: A/O x 3, no focal deficits  Psychiatric: Normal mood, normal affect  Skin: No rashes    MEDICATIONS  (STANDING):  artificial tears (preservative free) Ophthalmic Solution 1 Drop(s) Left EYE every 2 hours  ascorbic acid 500 milliGRAM(s) Oral daily  atorvastatin 40 milliGRAM(s) Oral at bedtime  chlorhexidine 4% Liquid 1 Application(s) Topical <User Schedule>  DULoxetine 30 milliGRAM(s) Oral two times a day  gabapentin 800 milliGRAM(s) Oral three times a day  labetalol 200 milliGRAM(s) Oral two times a day  lidocaine   Patch 1 Patch Transdermal daily  pantoprazole  Injectable 40 milliGRAM(s) IV Push every 12 hours  prednisoLONE acetate 1% Suspension 1 Drop(s) Left EYE four times a day  sodium chloride 2% Ophthalmic Solution 1 Drop(s) Left EYE three times a day  valACYclovir 1000 milliGRAM(s) Oral three times a day    MEDICATIONS  (PRN):  ondansetron Injectable 4 milliGRAM(s) IV Push every 6 hours PRN Nausea and/or Vomiting  oxyCODONE    IR 10 milliGRAM(s) Oral three times a day PRN Moderate Pain (4 - 6)  zolpidem 5 milliGRAM(s) Oral at bedtime PRN Insomnia      Allergies    No Known Allergies    Intolerances          LABS:                        9.1    10.7  )-----------( 262      ( 22 Sep 2019 07:30 )             29.1                         9.3    8.0   )-----------( 281      ( 21 Sep 2019 06:54 )             28.0                         8.7    8.0   )-----------( 235      ( 21 Sep 2019 01:19 )             25.2               LIVER FUNCTIONS - ( 20 Sep 2019 06:46 )  Alb: 2.5 g/dL / Pro: 4.9 g/dL / ALK PHOS: 82 U/L / ALT: 11 U/L / AST: 16 U/L / GGT: x               RADIOLOGY & ADDITIONAL TESTS:

## 2019-09-22 NOTE — PROGRESS NOTE ADULT - ASSESSMENT
Recurrent upper GI bleeding without defined source.   No active bleeding currently    Rec:    IV PPI  Regular diet  Dr Ambrocio to further address plan re ? d/c or follow up endoscopy

## 2019-09-22 NOTE — PROGRESS NOTE ADULT - ASSESSMENT
69 y/o F with PMHx of aortic dissection (post-repair several years prior), spinal cord hematoma (now functionally paraplegic), chronic spasticity and pain (on Oxycodone and Baclofen pump), HTN, nephrolithiasis s/p left urethral stent, UTIs and endotheliitis/keratitis (post-corneal transplant) presented as a transfer from [facility] for UGIB with acute blood loss anemia requiring multiple PRBC transfusions for further management of GI bleed. Work up prior to presentation included CTA A/P which was unable to localize source of bleed, abd multiple EGD's which showed pooled blood and/or adherent clot in gastric fundus that could not be removed. Patient transferred here for further management and evaluation by GI, she has undergone 4 endoscopic evaluations, and capsule study without clear source of bleeding. She briefly required MICU stay for urgent endoscopic evaluation in the setting of bleed, but has since been transferred back to medicine for further management.

## 2019-09-22 NOTE — PROGRESS NOTE ADULT - SUBJECTIVE AND OBJECTIVE BOX
Patient is a 68y old  Female who presents with a chief complaint of Transfer from [facility] for UGIB (20 Sep 2019 14:13)      SUBJECTIVE / OVERNIGHT EVENTS:  Tolerating diet.   No overt sign of bleeding.   Stable H and H     MEDICATIONS  (STANDING):  artificial tears (preservative free) Ophthalmic Solution 1 Drop(s) Left EYE every 2 hours  ascorbic acid 500 milliGRAM(s) Oral daily  atorvastatin 40 milliGRAM(s) Oral at bedtime  chlorhexidine 4% Liquid 1 Application(s) Topical <User Schedule>  DULoxetine 30 milliGRAM(s) Oral two times a day  gabapentin 800 milliGRAM(s) Oral three times a day  labetalol 200 milliGRAM(s) Oral two times a day  lidocaine   Patch 1 Patch Transdermal daily  pantoprazole  Injectable 40 milliGRAM(s) IV Push every 12 hours  prednisoLONE acetate 1% Suspension 1 Drop(s) Left EYE four times a day  sodium chloride 2% Ophthalmic Solution 1 Drop(s) Left EYE three times a day  valACYclovir 1000 milliGRAM(s) Oral three times a day    MEDICATIONS  (PRN):  ondansetron Injectable 4 milliGRAM(s) IV Push every 6 hours PRN Nausea and/or Vomiting  oxyCODONE    IR 10 milliGRAM(s) Oral three times a day PRN Moderate Pain (4 - 6)  zolpidem 5 milliGRAM(s) Oral at bedtime PRN Insomnia      CAPILLARY BLOOD GLUCOSE        I&O's Summary    20 Sep 2019 07:01  -  21 Sep 2019 07:00  --------------------------------------------------------  IN: 0 mL / OUT: 2540 mL / NET: -2540 mL        PHYSICAL EXAM:  Vital Signs Last 24 Hrs  T(C): 36.6 (21 Sep 2019 08:00), Max: 36.6 (20 Sep 2019 23:55)  T(F): 97.8 (21 Sep 2019 08:00), Max: 97.8 (20 Sep 2019 23:55)  HR: 74 (21 Sep 2019 08:00) (74 - 89)  BP: 145/68 (21 Sep 2019 08:00) (136/67 - 159/77)  BP(mean): --  RR: 18 (21 Sep 2019 08:00) (18 - 18)  SpO2: 97% (21 Sep 2019 08:00) (95% - 97%)  GENERAL: NAD, well-developed  HEAD:  Atraumatic, Normocephalic  EYES: EOMI, PERRLA, conjunctiva and sclera clear  NECK: Supple, No JVD  CHEST/LUNG: Clear to auscultation bilaterally; No wheeze  HEART: Regular rate and rhythm; No murmurs, rubs, or gallops  ABDOMEN: Soft, Nontender, Nondistended; Bowel sounds present  EXTREMITIES:  2+ Peripheral Pulses, No clubbing, cyanosis, or edema  PSYCH: AAOx3  NEUROLOGY: non-focal  SKIN: No rashes or lesions    LABS:                        9.3    8.0   )-----------( 281      ( 21 Sep 2019 06:54 )             28.0     09-20    143  |  106  |  16  ----------------------------<  101<H>  3.8   |  26  |  0.48<L>    Ca    8.7      20 Sep 2019 06:46  Phos  2.7     09-20  Mg     2.0     09-20    TPro  4.9<L>  /  Alb  2.5<L>  /  TBili  0.3  /  DBili  x   /  AST  16  /  ALT  11  /  AlkPhos  82  09-20    PT/INR - ( 20 Sep 2019 09:33 )   PT: 11.3 sec;   INR: 0.98 ratio         PTT - ( 20 Sep 2019 09:33 )  PTT:32.7 sec          RADIOLOGY & ADDITIONAL TESTS:    Imaging Personally Reviewed:    Consultant(s) Notes Reviewed:      Care Discussed with Consultants/Other Providers:

## 2019-09-23 NOTE — PROGRESS NOTE ADULT - ASSESSMENT
69 y/o F with PMHx of aortic dissection (post-repair several years prior), spinal cord hematoma (now functionally paraplegic), chronic spasticity and pain (on Oxycodone and Baclofen pump), HTN, nephrolithiasis s/p left urethral stent, UTIs and endotheliitis/keratitis (post-corneal transplant) presented as a transfer from [facility] for UGIB with acute blood loss anemia requiring multiple PRBC transfusions for further management of GI bleed. Work up prior to presentation included CTA A/P which was unable to localize source of bleed, abd multiple EGD's which showed pooled blood and/or adherent clot in gastric fundus that could not be removed. Patient transferred here for further management and evaluation by GI, she has undergone 4 endoscopic evaluations, and capsule study without clear source of bleeding. She briefly required MICU stay for urgent endoscopic evaluation in the setting of bleed, but has since been transferred back to medicine for further management.

## 2019-09-23 NOTE — PROGRESS NOTE ADULT - PROBLEM SELECTOR PLAN 8
-Continue with Tylenol, Oxycodone and Gabapentin, Cymbalta and Lidocaine  -Patient has baclofen pump

## 2019-09-23 NOTE — PROGRESS NOTE ADULT - ASSESSMENT
In summary, s/p multiple UGI bleed from unclear source though likely gastric Dieulafoy's lesion.  NO bleeding since last week.    REC:    From GI point ok to be discharge.   If bleeding recurs at home will call back and will require emergent EGD    Donell Ambrocio MD Bedside US performed.

## 2019-09-23 NOTE — PROGRESS NOTE ADULT - PROBLEM SELECTOR PROBLEM 1
UGIB (upper gastrointestinal bleed)

## 2019-09-23 NOTE — PROGRESS NOTE ADULT - PROBLEM SELECTOR PROBLEM 10
Discharge planning issues

## 2019-09-23 NOTE — PROGRESS NOTE ADULT - PROVIDER SPECIALTY LIST ADULT
Gastroenterology
Hospitalist
Infectious Disease
Internal Medicine
MICU
Ophthalmology
Pain Medicine
Rehab Medicine
Gastroenterology
MICU
Pain Medicine
Hospitalist

## 2019-09-23 NOTE — PROGRESS NOTE ADULT - PROBLEM SELECTOR PLAN 7
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

## 2019-09-23 NOTE — PROGRESS NOTE ADULT - SUBJECTIVE AND OBJECTIVE BOX
INTERVAL HPI/OVERNIGHT EVENTS:    Denies any GI bleed.  Eating well.    MEDICATIONS  (STANDING):  artificial tears (preservative free) Ophthalmic Solution 1 Drop(s) Left EYE every 2 hours  ascorbic acid 500 milliGRAM(s) Oral daily  atorvastatin 40 milliGRAM(s) Oral at bedtime  chlorhexidine 4% Liquid 1 Application(s) Topical <User Schedule>  DULoxetine 30 milliGRAM(s) Oral two times a day  gabapentin 800 milliGRAM(s) Oral three times a day  labetalol 200 milliGRAM(s) Oral two times a day  lidocaine   Patch 1 Patch Transdermal daily  pantoprazole    Tablet 40 milliGRAM(s) Oral every 12 hours  prednisoLONE acetate 1% Suspension 1 Drop(s) Left EYE four times a day  sodium chloride 2% Ophthalmic Solution 1 Drop(s) Left EYE three times a day  valACYclovir 1000 milliGRAM(s) Oral three times a day    MEDICATIONS  (PRN):  ondansetron Injectable 4 milliGRAM(s) IV Push every 6 hours PRN Nausea and/or Vomiting  oxyCODONE    IR 10 milliGRAM(s) Oral three times a day PRN Moderate Pain (4 - 6)  zolpidem 5 milliGRAM(s) Oral at bedtime PRN Insomnia      Allergies    No Known Allergies    Intolerances        Vital Signs Last 24 Hrs  T(C): 37.2 (23 Sep 2019 16:25), Max: 37.2 (23 Sep 2019 16:25)  T(F): 99 (23 Sep 2019 16:25), Max: 99 (23 Sep 2019 16:25)  HR: 102 (23 Sep 2019 16:25) (84 - 102)  BP: 96/62 (23 Sep 2019 18:23) (96/62 - 134/70)  BP(mean): --  RR: 18 (23 Sep 2019 16:25) (18 - 18)  SpO2: 93% (23 Sep 2019 16:25) (93% - 96%)    PHYSICAL EXAM:  Constitutional: NAD, well-developed  Neck: No LAD, supple  Respiratory: CTAB  Cardiovascular: S1 and S2, RRR,   Gastrointestinal: BS+, soft, NT/ND, neg HSM,      LABS:                        9.2    11.3  )-----------( 316      ( 23 Sep 2019 10:27 )             28.3     09-23    141  |  104  |  9   ----------------------------<  93  3.1<L>   |  29  |  0.43<L>    Ca    8.2<L>      23 Sep 2019 07:13          LIVER FUNCTIONS - ( 20 Sep 2019 06:46 )  Alb: 2.5 g/dL / Pro: 4.9 g/dL / ALK PHOS: 82 U/L / ALT: 11 U/L / AST: 16 U/L / GGT: x             RADIOLOGY & ADDITIONAL TESTS:

## 2019-09-23 NOTE — CHART NOTE - NSCHARTNOTEFT_GEN_A_CORE
68F with PMHx of aortic dissection repair, functional paraplegia 2/2 spinal cord hematoma, chronic spasticity and pain, Baclofen pump, Spinal cord stimulator, HTN, nephrolithiasis s/p left urethral stent and endotheliitis/keratitis (post-corneal transplant) transferred from [facility] after suffering UGIB requiring several PRBCs. Unable to identify source of bleed, Patient's PMD is Dr. Branham who recommended transfer to [facility] for evaluation by Dr. Ambrocio.   9/17 active, symptomatic GIB. EGD performed, unable to locate source, transferred to MICU intubated. Pt extubated while in MICU and transferred to floor.    Has a H/O dorsalgia and chronic spasticity, and has a [employer] intrathecal infusion device in place infusing Baclofen and Morphine.   Community pain management specialist Dr Vieira in [place].    Pump refilled while in MICU on 9/18.   To be refilled before 12/1/2019.    Pt sitting in bed, A&Ox3, happy to be going home tomorrow.   Pt informed of new pump refill date and that Dr. Vieira's office was called today, information relayed to Anjelica and copy of pump report was scanned and emailed to her.    [facility]  [phone]

## 2019-09-23 NOTE — PROGRESS NOTE ADULT - PROBLEM SELECTOR PROBLEM 9
Yeast infection
Yeast infection
Aortic dissection, thoracic
Discharge planning issues
Yeast infection
Aortic dissection, thoracic

## 2019-09-23 NOTE — PROGRESS NOTE ADULT - PROBLEM SELECTOR PLAN 10
home w home pt   patient has privately hired [facility] 6/7 days a week.
home w home pt   patient has privately hired [facility] 6/7 days a week.
Patient has privately hired [facility] 6/7 days a week.  Will continue to monitor 3-4 days to ensure stable hemoglobin. If stable, potential dispo early next week. Was previously recommended for home with home PT.
Patient has privately hired [facility] 6/7 days a week.  Will continue to monitor 3-4 days to ensure stable hemoglobin. If stable, potential dispo early next week. Was previously recommended for home with home PT.
Patient has privately hired [facility] 6/7 days a week.  Monitor Hb for stablility. If stable, potential dispo. Was previously recommended for home with home PT.
Patient has privately hired [facility] 6/7 days a week.  Will continue to monitor 3-4 days to ensure stable hemoglobin. If stable, potential dispo early next week. Was previously recommended for home with home PT.
home w home pt   patient has privately hired [facility] 6/7 days a week.
home w home pt   patient has privately hired [facility] 6/7 days a week.  OOB to chair
home w home pt   patient has privately hired [facility] 6/7 days a week.  OOB to chair
home w home pt   patient has privately hired [facility] 6/7 days a week.

## 2019-09-23 NOTE — PROGRESS NOTE ADULT - SUBJECTIVE AND OBJECTIVE BOX
Davion Swan M.D. Pager Number [phone]    Patient is a 68y old  Female who presents with a chief complaint of Transfer from [facility] for UGIB (22 Sep 2019 14:53)      SUBJECTIVE / OVERNIGHT EVENTS:  Pt seen and examined at bedside. No acute events overnight. Continues to endorse chronic LE pain. Denies diarrhea or Blood in stool at this time.   Pt denies cp, palpitations, sob, abd pain, N/V, fever, chills.     ROS:  All other review of systems negative    Allergies    No Known Allergies    Intolerances        MEDICATIONS  (STANDING):  artificial tears (preservative free) Ophthalmic Solution 1 Drop(s) Left EYE every 2 hours  ascorbic acid 500 milliGRAM(s) Oral daily  atorvastatin 40 milliGRAM(s) Oral at bedtime  chlorhexidine 4% Liquid 1 Application(s) Topical <User Schedule>  DULoxetine 30 milliGRAM(s) Oral two times a day  gabapentin 800 milliGRAM(s) Oral three times a day  labetalol 200 milliGRAM(s) Oral two times a day  lidocaine   Patch 1 Patch Transdermal daily  pantoprazole  Injectable 40 milliGRAM(s) IV Push every 12 hours  prednisoLONE acetate 1% Suspension 1 Drop(s) Left EYE four times a day  sodium chloride 2% Ophthalmic Solution 1 Drop(s) Left EYE three times a day  valACYclovir 1000 milliGRAM(s) Oral three times a day    MEDICATIONS  (PRN):  ondansetron Injectable 4 milliGRAM(s) IV Push every 6 hours PRN Nausea and/or Vomiting  oxyCODONE    IR 10 milliGRAM(s) Oral three times a day PRN Moderate Pain (4 - 6)  zolpidem 5 milliGRAM(s) Oral at bedtime PRN Insomnia      Vital Signs Last 24 Hrs  T(C): 36.7 (23 Sep 2019 08:00), Max: 36.9 (22 Sep 2019 15:36)  T(F): 98 (23 Sep 2019 08:00), Max: 98.4 (22 Sep 2019 15:36)  HR: 98 (23 Sep 2019 08:00) (82 - 98)  BP: 122/71 (23 Sep 2019 08:00) (122/71 - 134/70)  BP(mean): --  RR: 18 (23 Sep 2019 08:00) (18 - 18)  SpO2: 95% (23 Sep 2019 08:00) (93% - 96%)  CAPILLARY BLOOD GLUCOSE        I&O's Summary    22 Sep 2019 07:01  -  23 Sep 2019 07:00  --------------------------------------------------------  IN: 0 mL / OUT: 500 mL / NET: -500 mL        PHYSICAL EXAM:  GENERAL: NAD, well-developed  HEAD:  Atraumatic, Normocephalic  EYES: EOMI, PERRLA, conjunctiva and sclera clear  NECK: Supple, No JVD  CHEST/LUNG: Clear to auscultation bilaterally; No wheeze  HEART: Regular rate and rhythm; No murmurs, rubs, or gallops  ABDOMEN: Soft, Nontender, Nondistended; Bowel sounds present  EXTREMITIES:  2+ Peripheral Pulses, No clubbing, cyanosis, or edema  NEUROLOGY: AAOx3, unable to move her right leg  PSYCH: calm  SKIN: No rashes or lesions    LABS:                        9.2    11.3  )-----------( 316      ( 23 Sep 2019 10:27 )             28.3     09-23    141  |  104  |  9   ----------------------------<  93  3.1<L>   |  29  |  0.43<L>    Ca    8.2<L>      23 Sep 2019 07:13                RADIOLOGY & ADDITIONAL TESTS:    Imaging Personally Reviewed:    Consultant(s) Notes Reviewed:      Care Discussed with Consultants/Other Providers:    Case Discussed with Family:    Goals of Care:

## 2019-09-23 NOTE — PROGRESS NOTE ADULT - PROBLEM SELECTOR PLAN 4
Secondary to spinal hematoma  - straight cath (patient has chronic urinary retention and is cath'ed by her  at home) q4hr  - Miralax for chronic stool retention (does periodic manual disimpactions at home)  - renal/bladder US no hydronephrosis

## 2019-09-23 NOTE — PROGRESS NOTE ADULT - PROBLEM SELECTOR PLAN 2
-Resolved  -S/p Ceftriaxone (9/9-9/13) switched to meropenem (9/13-9/18)  -Urine culture with ESBL Kleb and Pseudomonas

## 2019-09-23 NOTE — PROGRESS NOTE ADULT - REASON FOR ADMISSION
Transfer from [facility] for UGIB
Transfer from [facility] for UGIB
Transfer from [facility] for UGIB
Transfer from [facility] for UGIB
Transfer from [facility] for UGIB
Transfer from [facility] for UGIB
Transfer from [facility] for UGIB
Transfer from [facility] for UGIB
Transfer from [facility] for UGIB
Transfer from [facility] for UGIB
Transfer from [facility] for UGIB
Transfer from [facility] for UGIB
Transfer from [facility] for UGIB
Transfer from [facility] for UGIB
Transfer from [facility] for UGIB
Transfer from [facility] for UGIB
Transfer from [facility] for UGIB
Transfer from [facility] for UGIB
Transfer from [facility] for UGIB
Transfer from [facility] for UGIB
Transfer from [facility] for UGIB
Transfer from [facility] for UGIB
Transfer from [facility] for UGIB
Transfer from [facility] for UGIB
Transfer from [facility] for UGIB
Transfer from [facility] for UGIB
Transfer from [facility] for UGIB
Transfer from [facility] for UGIB
Transfer from [facility] for UGIB
Transfer from [facility] for UGIB
UGIB
Transfer from [facility] for UGIB
Transfer from [facility] for UGIB
Transfer from [facility] for UGIB
Transfer from [facility] for UGIB
Transfer from [facility] for UGIB
Transfer from [facility] for UGIB
Transfer from [facility] for UGIB
Transfer from [facility] for UGIB
Transfer from [facility] for UGIB
Transfer from [facility] for UGIB
Transfer from [facility] for UGIB
Transfer from [facility] for UGIB
Transfer from [facility] for UGIB
Transfer from [facility] for UGIB
Transfer from [facility] for UGIB
Transfer from [facility] for UGIB

## 2019-09-23 NOTE — PROGRESS NOTE ADULT - PROBLEM SELECTOR PLAN 1
-Hospital course noted:  -s/p EGD by Dr. Ambrocio on 9/5 which showed acute gastritis, gastric polyps with no source of GI bleed identified  -s/p enteroscopy 9/10- jejunum normal. small hiatal hernia. gastritis neg for h pylori  -s/p EGD 9/13: No active bleeding or stigmata of bleeding.   -s/p EGD 9/17: 3 hour endoscopy, blood in the entire examined duodenum and interfered with adequate visualization. Procedure aborted.  -CTA 9/18: No definite extravasation. Severe stenosis of celiac ostium. Hypertrophy of the distal branches of celiac trunk and multiple collaterals are again noted.  -Continue PPI  -Hgb stable at 9.2. She does not appear to be bleeding at present.   -If recurrent GI bleeding, will d/w with GI; next steps tagged red cell scan or direct to IR for embo

## 2019-09-24 NOTE — DISCHARGE NOTE PROVIDER - NSDCCPCAREPLAN_GEN_ALL_CORE_FT
PRINCIPAL DISCHARGE DIAGNOSIS  Diagnosis: UGIB (upper gastrointestinal bleed)  Assessment and Plan of Treatment: 9/5 showed acute gastritis, gastric polyps with no source of GI bleed identified. Gastritis neg for H.Pylori. Enteroscopy 9/10 showed jejunum normal. small hiatal hernia. PillCam (VCE) study performed 9/11 showed active bleeding with large amount of blood noted. Bleeding site appeared to be in the distal aspect of the stomach. Emergent EGD was performed again on 9/13 after pt had bloody BM O/N but showed no evidence of active bleeding.  On morning of 9/17, Pt was tachycardic w/ HR in the 140s and notes to be in pain despite receiving pain medication. NGT was placed and aspirated approx 2-3cc of bring red blood. H/H was 7/21.7. S/p 2u PRBC today. Pt made NPO and underwent urgent EGD again on 9/17, which failed to show active bleeding sites. Pt intubated for EDG and pending transfer to MICU.  Of note, pt's hospital course c/b fevers up to 102.7F, leukocytosis and positive UA concerning for UTI. Ucx (9/9) was positive for Pseudomonas and Klebsiella ESBL. Bacteruria cleared after 5 day course of meropenum. 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 Baclofen pump due for refill 9/22. At the MICU, pt was initially intubated and sedated w/ propofol. NG placed and lavage showing dark red blood/stomach contents. H/H dropped to 6.7 on 9/18, pt received 1U PRBC and responded appropriately. Pt then underwent CTA on 9/18, which was neg for any source of bleeding. GI is following.  During time in MICU, pt had HTN with BP in the 180 and put on labetalol 200mg BID for a goal SBP of 130-150.   Patient successfully extubated 9/19 following CTA.  GI following and does not want any intervention for now, will continue to monitor once transferred to floor. On September 19 th she was transferred to the floor and maintain a stable hgb and no further active bleed.      SECONDARY DISCHARGE DIAGNOSES  Diagnosis: UTI (urinary tract infection)  Assessment and Plan of Treatment: Resolved with IV abx PRINCIPAL DISCHARGE DIAGNOSIS  Diagnosis: UGIB (upper gastrointestinal bleed)  Assessment and Plan of Treatment: 9/5 showed acute gastritis, gastric polyps with no source of GI bleed identified. Gastritis neg for H.Pylori. Enteroscopy 9/10 showed jejunum normal. small hiatal hernia. PillCam (VCE) study performed 9/11 showed active bleeding with large amount of blood noted. Bleeding site appeared to be in the distal aspect of the stomach. Emergent EGD was performed again on 9/13 after pt had bloody BM O/N but showed no evidence of active bleeding.  On morning of 9/17, Pt was tachycardic w/ HR in the 140s and notes to be in pain despite receiving pain medication. NGT was placed and aspirated approx 2-3cc of bring red blood. H/H was 7/21.7. S/p 2u PRBC today. Pt made NPO and underwent urgent EGD again on 9/17, which failed to show active bleeding sites. Pt intubated for EDG and pending transfer to MICU.  Of note, pt's hospital course c/b fevers up to 102.7F, leukocytosis and positive UA concerning for UTI. Ucx (9/9) was positive for Pseudomonas and Klebsiella ESBL. Bacteruria cleared after 5 day course of meropenum. 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 Baclofen pump due for refill 9/22. At the MICU, pt was initially intubated and sedated w/ propofol. NG placed and lavage showing dark red blood/stomach contents. H/H dropped to 6.7 on 9/18, pt received 1U PRBC and responded appropriately. Pt then underwent CTA on 9/18, which was neg for any source of bleeding. GI is following.  During time in MICU, pt had HTN with BP in the 180 and put on labetalol 200mg BID for a goal SBP of 130-150.   Patient successfully extubated 9/19 following CTA.  GI following and does not want any intervention for now, will continue to monitor once transferred to floor. On September 19 th she was transferred to the floor and maintain a stable hgb and no further active bleed.      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  Pt's exam findings were discussed with patient's outpt ophthalmologist, Dr. Blank who is aware that graft is failing. At that time, corneal specialist recommended pred forte for treatment.  Will change regimen to the following:  - c/w Prednisolone acetate 1% drops TID in left eye  - divina gtts TID OS  - preservative free AT q2h OS  - c/w valtrex PO 1g TID   - outpatient follow up  - findings and plan discussed with Dr. Blank and primary team      Diagnosis: UTI (urinary tract infection)  Assessment and Plan of Treatment: Resolved with IV abx

## 2019-09-24 NOTE — DISCHARGE NOTE PROVIDER - NSDCPNSUBOBJ_GEN_ALL_CORE
Davion Swan M.D. Pager Number [phone]        Patient is a 68y old  Female who presents with a chief complaint of Transfer from [facility] for UGIB (24 Sep 2019 10:10)            SUBJECTIVE / OVERNIGHT EVENTS:    Pt seen and examined at bedside. No acute events overnight. Pt denies bloody BM. Per RN, no blood noted in stool.     Pt denies cp, palpitations, sob abd pain, N/V, fever, chills.        ROS:    All other review of systems negative        Allergies        No Known Allergies        Intolerances                MEDICATIONS  (STANDING):    artificial tears (preservative free) Ophthalmic Solution 1 Drop(s) Left EYE every 2 hours    ascorbic acid 500 milliGRAM(s) Oral daily    atorvastatin 40 milliGRAM(s) Oral at bedtime    chlorhexidine 4% Liquid 1 Application(s) Topical <User Schedule>    DULoxetine 30 milliGRAM(s) Oral two times a day    gabapentin 800 milliGRAM(s) Oral three times a day    labetalol 200 milliGRAM(s) Oral two times a day    lidocaine   Patch 1 Patch Transdermal daily    pantoprazole    Tablet 40 milliGRAM(s) Oral every 12 hours    prednisoLONE acetate 1% Suspension 1 Drop(s) Left EYE four times a day    sodium chloride 2% Ophthalmic Solution 1 Drop(s) Left EYE three times a day    valACYclovir 1000 milliGRAM(s) Oral three times a day        MEDICATIONS  (PRN):    ondansetron Injectable 4 milliGRAM(s) IV Push every 6 hours PRN Nausea and/or Vomiting    oxyCODONE    IR 10 milliGRAM(s) Oral three times a day PRN Moderate Pain (4 - 6)    zolpidem 5 milliGRAM(s) Oral at bedtime PRN Insomnia            Vital Signs Last 24 Hrs    T(C): 37.3 (24 Sep 2019 10:24), Max: 37.3 (24 Sep 2019 10:24)    T(F): 99.1 (24 Sep 2019 10:24), Max: 99.1 (24 Sep 2019 10:24)    HR: 74 (24 Sep 2019 10:24) (74 - 102)    BP: 99/64 (24 Sep 2019 10:24) (96/62 - 152/76)    BP(mean): --    RR: 18 (24 Sep 2019 10:24) (18 - 18)    SpO2: 93% (24 Sep 2019 10:24) (92% - 93%)    CAPILLARY BLOOD GLUCOSE                I&O's Summary        23 Sep 2019 07:01  -  24 Sep 2019 07:00    --------------------------------------------------------    IN: 0 mL / OUT: 1300 mL / NET: -1300 mL        24 Sep 2019 07:01  -  24 Sep 2019 12:19    --------------------------------------------------------    IN: 280 mL / OUT: 300 mL / NET: -20 mL                PHYSICAL EXAM:    GENERAL: NAD, well-developed    CHEST/LUNG: Clear to auscultation bilaterally; No wheeze    HEART: Regular rate and rhythm; No murmurs, rubs, or gallops    ABDOMEN: Soft, Nontender, Nondistended; Bowel sounds present    EXTREMITIES:  2+ Peripheral Pulses, No clubbing, cyanosis, or edema    NEUROLOGY: AAOx3, unable to move her right leg    PSYCH: calm    SKIN: No rashes or lesions        LABS:                            8.6      10.5  )-----------( 356      ( 24 Sep 2019 07:25 )               26.0         09-24        140  |  104  |  9     ----------------------------<  95    3.9   |  26  |  0.40<L>        Ca    8.1<L>      24 Sep 2019 07:25            Assessment and Plan:     · Assessment        69 y/o F with PMHx of aortic dissection (post-repair several years prior), spinal cord hematoma (now functionally paraplegic), chronic spasticity and pain (on Oxycodone and Baclofen pump), HTN, nephrolithiasis s/p left urethral stent, UTIs and endotheliitis/keratitis (post-corneal transplant) presented as a transfer from [facility] for UGIB with acute blood loss anemia requiring multiple PRBC transfusions for further management of GI bleed. Work up prior to presentation included CTA A/P which was unable to localize source of bleed, abd multiple EGD's which showed pooled blood and/or adherent clot in gastric fundus that could not be removed. Patient transferred here for further management and evaluation by GI, she has undergone 4 endoscopic evaluations, and capsule study without clear source of bleeding. She briefly required MICU stay for urgent endoscopic evaluation in the setting of bleed, but has since been transferred back to medicine for further management.         Problem/Plan - 1:    ·  Problem: UGIB (upper gastrointestinal bleed).  Plan: -Hospital course noted:    -s/p EGD by Dr. Ambrocio on 9/5 which showed acute gastritis, gastric polyps with no source of GI bleed identified    -s/p enteroscopy 9/10- jejunum normal. small hiatal hernia. gastritis neg for h pylori    -s/p EGD 9/13: No active bleeding or stigmata of bleeding.     -s/p EGD 9/17: 3 hour endoscopy, blood in the entire examined duodenum and interfered with adequate visualization. Procedure aborted.    -CTA 9/18: No definite extravasation. Severe stenosis of celiac ostium. Hypertrophy of the distal branches of celiac trunk and multiple collaterals are again noted.    -Continue PPI    -Hgb stable at 8.6. She does not appear to be bleeding at present.     -GI recs appreciated; unclear source though likely gastric Dieulafoy's lesion. From GI point ok to be discharge.             Problem/Plan - 2:    ·  Problem: UTI (urinary tract infection).  Plan: -Resolved    -S/p Ceftriaxone (9/9-9/13) switched to meropenem (9/13-9/18)    -Urine culture with ESBL Kleb and Pseudomonas.          Problem/Plan - 3:    ·  Problem: Conjunctivitis of left eye.  Plan: Completed erythromycin ointment two times per day to left eye    - c/w valtrex from 1g1g TID PO.          Problem/Plan - 4:    ·  Problem: Paraplegia.  Plan: Secondary to spinal hematoma    - straight cath (patient has chronic urinary retention and is cath'ed by her  at home) q4hr    - Miralax for chronic stool retention (does periodic manual disimpactions at home)    - renal/bladder US no hydronephrosis.          Problem/Plan - 5:    ·  Problem: HTN (Hypertension).  Plan: -Chronic    -Continue  Labetalol.          Problem/Plan - 6:    Problem: Urethral foreign body, subsequent encounter. Plan: Patient had recent left sided nephrolithiasis with urethral stent placement    KUB showing rounded density over L ureter as seen on CT previously    - urology follow up opt.         Problem/Plan - 7:    ·  Problem: Keratitis.  Plan: - Possibly secondary to HSV or CMV, but  can only say "a virus" and now s/p corneal transplant which is failing according to ophthalmology and confirmed by optho here    - Continue with Valtrex for viral suppression    - Continue with Prednisolone eye drop into left eye    - Daily artificial tears    - Appreciated ophtho recommendations.          Problem/Plan - 8:    ·  Problem: Chronic pain.  Plan: -Continue with Tylenol, Oxycodone and Gabapentin, Cymbalta and Lidocaine    -Patient has baclofen pump which was refilled while in MICU on 9/18.     -To be refilled before 12/1/2019             Problem/Plan - 9:    ·  Problem: Aortic dissection, thoracic.  Plan: Post repair and appears stable on recent CTA performed at [place]    - Outpatient follow up.        32mins spent discussing discharge instructions

## 2019-09-24 NOTE — CHART NOTE - NSCHARTNOTEFT_GEN_A_CORE
68F with PMHx of aortic dissection repair, functional paraplegia 2/2 spinal cord hematoma, chronic spasticity and pain, Baclofen pump, Spinal cord stimulator, HTN, nephrolithiasis s/p left urethral stent and endotheliitis/keratitis (post-corneal transplant) transferred from [facility] after suffering UGIB requiring several PRBCs. Unable to identify source of bleed, Patient's PMD is Dr. Branham who recommended transfer to [facility] for evaluation by Dr. Ambrocio.   9/17 active, symptomatic GIB. EGD performed, unable to locate source, transferred to MICU intubated. Pt extubated while in MICU and transferred to floor.    Has a H/O dorsalgia and chronic spasticity, and has a [employer] intrathecal infusion device in place infusing Baclofen and Morphine.   Community pain management specialist Dr Vieira in [place].    Pump refilled while in MICU on 9/18/19.   Pump alarm date is 12/1/2019.  Pt aware of new pump refill date.  Dr. Vieira's office sent copy of pump refill printout and aware of new alarm date of 12/1/19.    Pt to be discharged today, will sign off at this time.    [facility]  [phone]

## 2019-09-24 NOTE — DISCHARGE NOTE PROVIDER - PROVIDER TOKENS
FREE:[LAST:[Yonas],FIRST:[],PHONE:[(   )    -],FAX:[(   )    -],ADDRESS:[PCP]] PROVIDER:[TOKEN:[[id number]]],PROVIDER:[TOKEN:[[id number]]]

## 2019-09-24 NOTE — DISCHARGE NOTE PROVIDER - CARE PROVIDER_API CALL
Dr. Yonas  PCP  Phone: (   )    -  Fax: (   )    -  Follow Up Time: Basil Branham)  Cardiovascular Disease; Internal Medicine  [address], [address]  [place] [address]  Phone: [phone]  Fax: [phone]  Follow Up Time:     Donell Ambrocio)  Gastroenterology  [address], [address]  [place] [address]  Phone: [phone]  Fax: [phone]  Follow Up Time:

## 2019-09-24 NOTE — CHART NOTE - NSCHARTNOTESELECT_GEN_ALL_CORE
Hospital Medicine Transfer Accept Note/Transfer Note
ATTENDING NOTE
Event Note
Event Note/Chronic Pain
Event Note/Chronic Pain
Event Note/Chronic Pain Service
Event Note/Hg 6.8
Event Note/MAR Accept
Event Note/Tachycardia
Event Note/dark tarry stool
Event Note/tachycardia
Febrile/Event Note
Left hand weakness/Event Note
MICU Acceptance Note/Transfer Note
MICU/Transfer Note
Nutrition Services
Tachycardia/Event Note

## 2019-09-24 NOTE — DISCHARGE NOTE PROVIDER - HOSPITAL COURSE
69 y/o F with PMHx of aortic dissection (post-repair several years prior), spinal cord hematoma (now functionally paraplegic), chronic spasticity and pain (on Oxycodone and Baclofen pump), HTN, nephrolithiasis s/p left urethral stent, UTIs and endotheliitis/keratitis (post-corneal transplant), with recent hospitalizations at [facility] for UGIB with acute blood loss anemia requiring multiple PRBC transfusions. Patient was at home in early August 2019 when  thought she was not mentating well. He took her vitals at that time and found her to have a systolic BP in the 70s and HR in the 150s. When he brought her to [facility] they found her Hg to be 4.4. Unknown if she was having melena or hematochezia at that time. While she was there patient underwent four EGDs and received more than 10 units PRBC. While she was there patient underwent four EGDs and received more than 10 units PRBC. EGDs were unable to identify active source of bleeding and usually showed pooled blood. There is suspicion for dieulafoy, but unable to be acted upon at [place] with the multiple EGDs at times only showing adherent clot. Surgery consult recommended ex-lap given the severity, but patient's family deferred for now. Patient's PMD is Dr. Branham who recommended transfer to [facility] for evaluation by Dr. Ambrocio.         At [facility] [place], EGD by Dr. Ambrocio 9/5 showed acute gastritis, gastric polyps with no source of GI bleed identified. Gastritis neg for H.Pylori. Enteroscopy 9/10 showed jejunum normal. small hiatal hernia. PillCam (VCE) study performed 9/11 showed active bleeding with large amount of blood noted. Bleeding site appeared to be in the distal aspect of the stomach. Emergent EGD was performed again on 9/13 after pt had bloody BM O/N but showed no evidence of active bleeding.  On morning of 9/17, Pt was tachycardic w/ HR in the 140s and notes to be in pain despite receiving pain medication. NGT was placed and aspirated approx 2-3cc of bring red blood. H/H was 7/21.7. S/p 2u PRBC today. Pt made NPO and underwent urgent EGD again on 9/17, which failed to show active bleeding sites. Pt intubated for EDG and pending transfer to MICU.          Of note, pt's hospital course c/b fevers up to 102.7F, leukocytosis and positive UA concerning for UTI. Ucx (9/9) was positive for Pseudomonas and Klebsiella ESBL. Bacteruria cleared after 5 day course of meropenum. 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 Baclofen pump due for refill 9/22.         At the MICU, pt was initially intubated and sedated w/ propofol. NG placed and lavage showing dark red blood/stomach contents. H/H dropped to 6.7 on 9/18, pt received 1U PRBC and responded appropriately. Pt then underwent CTA on 9/18, which was neg for any source of bleeding. GI is following.  During time in MICU, pt had HTN with BP in the 180 and put on labetalol 200mg BID for a goal SBP of 130-150.   Patient successfully extubated 9/19 following CTA. Pt advanced to liquid only diet. Discussed with GI and Dr. Branham regarding possible provoked bleeding test to find source of bleeding and was told that the procedure is contraindicated due to prior hx of spontaneous subdural hematoma. GI following and does not want any intervention for now, will continue to monitor once transferred to floor. On September 19 th she was transferred to the floor and maintain a stable hgb and no further active bleed, as per Dr Ambrocio, no further intervention unless she dropped her hematocrit or actively bleeding. Mrs Rodriguez remained stable and required no further intervention, She will follow up with her pvt GI at [facility]. In reference to her corneal transplant with possibly failure graft, patient advised to see her opthamologist outpatient 67 y/o F with PMHx of aortic dissection (post-repair several years prior), spinal cord hematoma (now functionally paraplegic), chronic spasticity and pain (on Oxycodone and Baclofen pump), HTN, nephrolithiasis s/p left urethral stent, UTIs and endotheliitis/keratitis (post-corneal transplant), with recent hospitalizations at [facility] for UGIB with acute blood loss anemia requiring multiple PRBC transfusions. While she was there patient underwent four EGDs and received more than 10 units PRBC. EGDs were unable to identify active source of bleeding and usually showed pooled blood. There is suspicion for dieulafoy, but unable to be acted upon at [place] with the multiple EGDs at times only showing adherent clot. Surgery consult recommended ex-lap given the severity, but patient's family deferred for now. Patient's PMD is Dr. Branham who recommended transfer to [facility] for evaluation by Dr. Ambrocio.         At [facility], EGD by Dr. Ambrocio 9/5 showed acute gastritis, gastric polyps with no source of GI bleed identified. Gastritis neg for H.Pylori. Enteroscopy 9/10 showed jejunum normal. small hiatal hernia. PillCam (VCE) study performed 9/11 showed active bleeding with large amount of blood noted. Bleeding site appeared to be in the distal aspect of the stomach. Emergent EGD was performed again on 9/13 after pt had bloody BM O/N but showed no evidence of active bleeding.  On morning of 9/17, Pt was tachycardic w/ HR in the 140s and notes to be in pain despite receiving pain medication. NGT was placed and aspirated approx 2-3cc of bring red blood. H/H was 7/21.7. S/p 2u PRBC today. Pt made NPO and underwent urgent EGD again on 9/17, which failed to show active bleeding sites. Pt intubated for EDG and pending transfer to MICU.          Of note, pt's hospital course c/b fevers up to 102.7F, leukocytosis and positive UA concerning for UTI. Ucx (9/9) was positive for Pseudomonas and Klebsiella ESBL. Bacteruria cleared after 5 day course of meropenum. 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 Baclofen pump due for refill 9/22.         At the MICU, pt was initially intubated and sedated w/ propofol. NG placed and lavage showing dark red blood/stomach contents. H/H dropped to 6.7 on 9/18, pt received 1U PRBC and responded appropriately. Pt then underwent CTA on 9/18, which was neg for any source of bleeding. GI is following.  During time in MICU, pt had HTN with BP in the 180 and put on labetalol 200mg BID for a goal SBP of 130-150.   Patient successfully extubated 9/19 following CTA. Pt advanced to liquid only diet. Discussed with GI and Dr. Branham regarding possible provoked bleeding test to find source of bleeding and was told that the procedure is contraindicated due to prior hx of spontaneous subdural hematoma. GI following and does not want any intervention for now, will continue to monitor once transferred to floor. On September 19 th she was transferred to the floor and maintain a stable hgb and no further active bleed, as per Dr Ambrocio, no further intervention unless she dropped her hematocrit or actively bleeding. Mrs Rodriguez remained stable and required no further intervention, She will follow up with her pvt GI at [facility]. In reference to her corneal transplant with possibly failure graft, patient advised to see her opthamologist outpatient

## 2019-09-27 NOTE — ED PROVIDER NOTE - OBJECTIVE STATEMENT
68F w/ pmh aortic dissection (post-repair several years prior), spinal cord hematoma (now functionally paraplegic, she can move her left leg), chronic spasticity and pain (on PRN Oxycodone and has Baclofen pump), HTN, nephrolithiasis s/p left urethral stent  -- transferred from [facility] for UGIB for GI to see at [facility]. 68F w/ pmh aortic dissection (post-repair several years prior), spinal cord hematoma (now functionally paraplegic, she can move her left leg), chronic spasticity and pain (on PRN Oxycodone and has Baclofen pump), HTN, nephrolithiasis s/p left urethral stent  -- transferred from [facility] for UGIB for GI to see at [facility], where pt has seen GI in the past. Pt presented to OSH after routine H/H checks at home revealed low H/H. She denies obvious melena or BRBPR as well as hematemesis, but states does not usually look at her stool. At OSH was occult positive w Hb 6.9. Was given 1u PRBC and started on protonix gtt. En route became tachycardic to 130s and hypotensive 80s/60s. IV fluid was given w improvement in VS, and zofran was given for nausea which has improved. She denies cp, SOB or abd pain. Feels lightheaded but denies other complaints other than chronic leg twitching. Of note, pt has had numerous endoscopies in recent past which reveal pooling of blood and possible Dieulafoy but never identify distinct lesions for repair.

## 2019-09-27 NOTE — ED PROVIDER NOTE - NSBENEFITOFTRANSFER_ED_A_ED
Worsening of Condition, Death, or Disability if Patient Does Not Transfer/Continuity of Care at Other Facility

## 2019-09-27 NOTE — ED PROVIDER NOTE - MUSCULOSKELETAL MINIMAL EXAM
normal range of motion/FROM of the UE b/l. Pt with lower extremity paraplegia, chronic and at baseline.

## 2019-09-27 NOTE — ED PROVIDER NOTE - PROGRESS NOTE DETAILS
Elizabeth Goldberger PGY-3: got call from Dr. Webster who works w pt's GI, aware of pt. Discussed pt's current hemodynamics tachy 120s and hypotensive but curently AO3. Stated as pt w 6 recent endoscopies w/o obvious source no plan for another. If pt w active hematochezia request CTA urgently, otherwise do not want. Also request vascular consult as pt w aortic dissection hx and c/f possible aortoenteric fistula. Micu already consulted for hemodynamics Peng Mark PGY3: received signout on patient, she received ceftriaxone earlier this evening at [facility] Peng Mark PGY3: received signout on patient, she received ceftriaxone earlier this evening at [place]; just finished first unit prbc here and bp 97/47; vascular consulted

## 2019-09-27 NOTE — CONSULT NOTE ADULT - ASSESSMENT
69y/o F with PMHx aortic dissection (s/p several years prior), 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     #Hypotension, UGIB  Pt with multiple admissions for acute blood loss anemia from suspected D    #UTI  -continue meropenem  -obtain UCx    Pt is currently not a MICU candidate  D/w Dr. Guy 69y/o F with PMHx aortic dissection (s/p several years prior), 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     #Hypotension 2/2 UGIB  Pt with multiple admissions for acute blood loss anemia from suspected Dieulafoy lesion, last admission requiring >10 u pRBC. Presented with hypotension 80/44 that improved after resuscitation. Pt is mentating well, asymptomatic.  -s/p 2u pRBC, 1L IVF  -Check post-transfusion CBC, goal Hg >8  -Continue resuscitation with pRBC and IVF to maintain MAP >65  -GI consult  -CTA abdomen if active bleeding    #UTI  Hx Pseudomnas and ESBL Klebsiella UTI in patient who chronically straight caths.  -continue meropenem  -obtain UCx    Pt is currently not a MICU candidate  D/w Dr. Guy 69y/o F with PMHx aortic dissection (s/p several years prior), 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     #Hypotension 2/2 UGIB, acute blood loss anemia  Pt with multiple admissions for acute blood loss anemia from suspected Dieulafoy lesion, last admission requiring >10 u pRBC. Presented with hypotension 80/44 that improved after resuscitation. Pt is mentating well, asymptomatic. No sign of hemolysis of bone marrow dysfunction on labs.  -s/p 2u pRBC, 1L IVF  -Check post-transfusion CBC, goal Hg >8  -Continue resuscitation with pRBC and IVF to maintain MAP >65  -GI consult  -CTA abdomen if active bleeding    #UTI  Hx Pseudomnas and ESBL Klebsiella UTI in patient who chronically straight caths.  -continue meropenem  -obtain UCx    Pt is currently not a MICU candidate  D/w Dr. Guy 67y/o F with PMHx aortic dissection (s/p several years prior), 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     #Hypotension 2/2 UGIB, acute blood loss anemia  Pt with multiple admissions for acute blood loss anemia from suspected Dieulafoy lesion, last admission requiring >10 u pRBC. Presented with hypotension 80/44 that improved after resuscitation. Pt is mentating well, asymptomatic. No sign of hemolysis of bone marrow dysfunction on labs.  -s/p 2u pRBC, 1L IVF  -Check post-transfusion CBC, goal Hg >7  -Continue resuscitation with pRBC and IVF to maintain MAP >65  -GI consult  -CTA abdomen if active bleeding    #UTI  Hx Pseudomnas and ESBL Klebsiella UTI in patient who chronically straight caths.  -continue meropenem  -obtain UCx    Pt is currently not a MICU candidate  D/w Dr. Guy 67y/o F with PMHx aortic dissection (s/p several years prior), 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     #Hypotension 2/2 UGIB, acute blood loss anemia  Pt with multiple admissions for acute blood loss anemia from suspected Dieulafoy lesion, last admission requiring >10 u pRBC. Presented with incidentally found hypotension 80/44 that improved after resuscitation. Pt is mentating well, asymptomatic. No sign of hemolysis or bone marrow dysfunction on labs.  -s/p 2u pRBC, 1L IVF  -Check post-transfusion CBC, goal Hg >7  -Continue resuscitation with pRBC and IVF to maintain MAP >65  -GI consult  -CTA abdomen if active bleeding    #UTI  Hx Pseudomnas and ESBL Klebsiella UTI in patient who chronically straight caths.  -continue meropenem  -obtain UCx    Pt is currently not a MICU candidate  D/w Dr. Guy

## 2019-09-27 NOTE — ED PROVIDER NOTE - PROGRESS NOTE DETAILS
Scribe SI for ED attending, Dr. Medarno: Agree with physical exam and plan. I Katheryn Villeda attest that this documentation has been prepared under the direction and in the presence of Doctor Medrano. Spoke with Dr. Downing from [facility]. WIll accept the transfer of the pt. -Familia Vallecillo PA-C

## 2019-09-27 NOTE — ED PROVIDER NOTE - CLINICAL SUMMARY MEDICAL DECISION MAKING FREE TEXT BOX
69 yo female presents with low hemoglobin. Pt had blood drawn from visiting nurse and was called today by her pmd to come to the ER. Denies ,melena or bloody stool. Labs, Guaiac, ekg, meds, Reeval. -Familia Vallecillo PA-C

## 2019-09-27 NOTE — ED PROVIDER NOTE - CARE PLAN
Principal Discharge DX:	Gastrointestinal hemorrhage, unspecified gastrointestinal hemorrhage type  Secondary Diagnosis:	Acute cystitis with hematuria  Secondary Diagnosis:	Anemia, unspecified type

## 2019-09-27 NOTE — ED ADULT NURSE NOTE - OBJECTIVE STATEMENT
68y female arrived to ED for GI bleed. Patient transferred from [facility] further evaluation of upper GI bleed. Patient PMHx aortic dissection, subdural hematoma, anemia, PAD, UTIs and self catheterizes, TIA, paraplegic (wheelchair bound). Patient had multiple recent endoscopies. Patient arrived to ED hypotensive, nauseous, pale, complaining of back pain (patient has chronic backpain). Patient given 500ml and Zofran en route. Patient received 1 unit of PRBCs PTA. Patient on protonix drip that was initiated at [facility] 8mg/hr. Patient denies CP, SOB, v/d, fever chills, abd pain. Patient has pain pump on the right abd. Patient A&Ox4, VS stable.

## 2019-09-27 NOTE — ED ADULT NURSE NOTE - NSIMPLEMENTINTERV_GEN_ALL_ED
Implemented All Fall with Harm Risk Interventions:  [place] to call system. Call bell, personal items and telephone within reach. Instruct patient to call for assistance. Room bathroom lighting operational. Non-slip footwear when patient is off stretcher. Physically safe environment: no spills, clutter or unnecessary equipment. Stretcher in lowest position, wheels locked, appropriate side rails in place. Provide visual cue, wrist band, yellow gown, etc. Monitor gait and stability. Monitor for mental status changes and reorient to person, place, and time. Review medications for side effects contributing to fall risk. Reinforce activity limits and safety measures with patient and family. Provide visual clues: red socks.

## 2019-09-27 NOTE — CONSULT NOTE ADULT - ATTENDING COMMENTS
care provided 9/27/19  Pt seen and examined.  Agree with resident note as above.  Pt with hx as above including UGIB with unknown source who was recently d/c'ed from hospital after a long admission, and found tonight to have hypoTN incidentally by VNS.  In [facility] given 1U PRBC, then tx to [facility] where she was given 1U RBC and 1L IVF.  BP is now acceptable ~100 systolic.  Pt awake, alert, interactive, stable vitals, no overt active bleeding.  Subsequent blood count overnigh shows good correction to blood resuscitation.  No need for MICU at this time.  Recs as above and I have edited as appropriate.

## 2019-09-27 NOTE — CONSULT NOTE ADULT - SUBJECTIVE AND OBJECTIVE BOX
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 At OSH ED she received 1u pRBC and 1L IVF. Transferred to [facility] ED for GI evaluation. Pt had a visiting nurse come yesterday who found low BP and sent her to the ED. She reports she is completely asymptomatic, denying dizziness, vision change, CP, SOB, N/V/D, abd pain, abd distension, hematochezia, melena. She states she has never had red blood or black sticky stool. 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 At OSH ED she received 1u pRBC and 1L IVF. Transferred to [facility] ED for GI evaluation. Pt had a visiting nurse come yesterday who found low BP and sent her to the ED. She reports she is completely asymptomatic, denying dizziness, vision change, CP, SOB, N/V/D, abd pain, abd distension, hematochezia, melena. She states she has never had red blood or black tarry stool.    Recent admission 2019- for anemia 2/2 UGIB requiring >12u pRBC. Multiple EGDs could not find a source, suspected 2/2 Dieulafoy lesion. MICU stay for intubation due to long EGD for gastric washout. Treated for Pseudomonas and ESBL Klebsiella UTI with meropenem. Discharged with Hg 8.6-9.2 and SBP 120s-140s. After 2u pRBC and 1L IVF total, BP improved from 80/44 to 101/61.      PAST MEDICAL & SURGICAL HISTORY:  Subdural hematoma, nontraumatic: spontaneous  Dorsalgia of lumbar region: on pain medication /baclofen po and pump  Self-catheterizes urinary bladder  Anemia: chronic  Uveitis  Osteoporosis  PAD (peripheral artery disease)  Hematoma: spinal  September  treated  2018  Paraplegia: on wheelchair goes to physical therapy 2 x weekly  Aortic dissection, thoracic: Type A Repaired   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2018  UTI (urinary tract infection): stable x 3 months  TIA (transient ischemic attack)  HTN (Hypertension): on meds  History of corneal transplant: left corneal transplant on 2018  Disorder of spine: unthetethering 2 x  Presence of IVC filter:  ?  S/P aortic dissection repair: Type A Dissection repair /   descending aortic aneurysm repair 2016  H/O Spinal surgery: laminectomies       FAMILY HISTORY:      SOCIAL HISTORY:  Smoking: [ ] Never Smoked [ ] Former Smoker (__ packs x ___ years) [ ] Current Smoker  (__ packs x ___ years)  Substance Use: [ ] Never Used [ ] Used ____  EtOH Use:  Marital Status: [ ] Single [x ]  [ ]  [ ]   Lives with , has HHA. Wheelchair bound    Allergies    No Known Allergies    Intolerances        HOME MEDICATIONS:    REVIEW OF SYSTEMS:  Constitutional: [x ] negative [ ] fevers [ ] chills [ ] weight loss [ ] weight gain  HEENT: [x ] negative [ ] dry eyes [ ] eye irritation [ ] postnasal drip [ ] nasal congestion  CV: [x ] negative  [ ] chest pain [ ] orthopnea [ ] palpitations [ ] murmur  Resp: [x ] negative [ ] cough [ ] shortness of breath [ ] dyspnea [ ] wheezing [ ] sputum [ ] hemoptysis  GI: [x ] negative [ ] nausea [ ] vomiting [ ] diarrhea [ ] constipation [ ] abd pain [ ] dysphagia   : [x ] negative [ ] dysuria [ ] nocturia [ ] hematuria [ ] increased urinary frequency  Musculoskeletal: [ ] negative [x ] back pain [ ] myalgias [ ] arthralgias [ ] fracture  Skin: [x ] negative [ ] rash [ ] itch  Neurological: [ x] negative [ ] headache [ ] dizziness [ ] syncope [ ] weakness [ ] numbness  Psychiatric: [x ] negative [ ] anxiety [ ] depression  Endocrine: [x ] negative [ ] diabetes [ ] thyroid problem  Hematologic/Lymphatic: [x ] negative [ ] anemia [ ] bleeding problem  Allergic/Immunologic: [ x] negative [ ] itchy eyes [ ] nasal discharge [ ] hives [ ] angioedema  [ ] All other systems negative  [ ] Unable to assess ROS because ________    OBJECTIVE:  ICU Vital Signs Last 24 Hrs  T(C): 36.6 (27 Sep 2019 23:07), Max: 36.9 (27 Sep 2019 17:00)  T(F): 97.9 (27 Sep 2019 23:07), Max: 98.4 (27 Sep 2019 17:00)  HR: 96 (28 Sep 2019 00:00) (81 - 125)  BP: 100/67 (27 Sep 2019 23:07) (80/44 - 152/72)  BP(mean): --  ABP: --  ABP(mean): --  RR: 18 (27 Sep 2019 23:07) (17 - 20)  SpO2: 99% (27 Sep 2019 23:07) (94% - 99%)      CAPILLARY BLOOD GLUCOSE    PHYSICAL EXAM:  General: WN/WD NAD  Neurology: A&Ox3, moves b/l UE  Eyes: L eye corneal transplant, R pupil reactive  HEENT: Neck supple, trachea midline, No JVD, Gross hearing intact  Respiratory: CTA B/L, No wheezing, rales, rhonchi, , on 2L NC  CV: RRR, S1S2, no murmurs, rubs or gallops  Abdominal: Soft, NT, ND +BS, no guarding, no distension  Extremities: No edema, + peripheral pulses  Skin: No Rashes, Hematoma, Ecchymosis    Lines/drains/airway:     LINES:     HOSPITAL MEDICATIONS:  MEDICATIONS  (STANDING):  pantoprazole Infusion 8 mG/Hr (10 mL/Hr) IV Continuous <Continuous>    MEDICATIONS  (PRN):      LABS:                        6.8    9.6   )-----------( 593      ( 27 Sep 2019 22:54 )             21.5     Hgb Trend: 6.8<--, 6.9<--, 8.6<--, 9.2<--, 9.1<--      141  |  109<H>  |  22  ----------------------------<  137<H>  4.3   |  23  |  0.54    Ca    7.8<L>      27 Sep 2019 22:54    TPro  4.7<L>  /  Alb  2.4<L>  /  TBili  0.2  /  DBili  x   /  AST  12  /  ALT  6<L>  /  AlkPhos  66      Creatinine Trend: 0.54<--, 0.57<--, 0.40<--, 0.43<--, 0.48<--, 0.40<--  PT/INR - ( 27 Sep 2019 22:54 )   PT: 12.6 sec;   INR: 1.10 ratio         PTT - ( 27 Sep 2019 22:54 )  PTT:30.2 sec  Urinalysis Basic - ( 27 Sep 2019 18:41 )    Color: Yellow / Appearance: very cloudy / S.010 / pH: x  Gluc: x / Ketone: Negative  / Bili: Negative / Urobili: Negative mg/dL   Blood: x / Protein: 30 mg/dL / Nitrite: Positive   Leuk Esterase: Moderate / RBC: 25-50 /HPF / WBC >50   Sq Epi: x / Non Sq Epi: Moderate / Bacteria: Many        MICROBIOLOGY:     RADIOLOGY:  [ ] Reviewed and interpreted by me    EKG: CHIEF COMPLAINT: GIB    HPI: 69y/o F with PMHx aortic dissection (s/p several years prior), 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 Recently discharged from [facility]  for UGIB. At OSH ED she received 1u pRBC and 1L IVF. Transferred to [facility] ED for GI evaluation. Pt had a visiting nurse come yesterday who found low BP and sent her to the ED. She reports she is completely asymptomatic, denying dizziness, vision change, CP, SOB, N/V/D, abd pain, abd distension, hematochezia, melena. Reports she has been eating and drinking normal amounts. She states she has never had red blood or black tarry stool.    Recent admission 2019- for anemia 2/2 UGIB requiring >12u pRBC. Multiple EGDs could not find a source, suspected 2/2 Dieulafoy lesion. MICU stay for intubation due to long EGD for gastric washout. Treated for Pseudomonas and ESBL Klebsiella UTI with meropenem. Discharged with Hg 8.6-9.2 and SBP 120s-140s. After 2u pRBC and 1L IVF total, BP improved from 80/44 to 101/61.      PAST MEDICAL & SURGICAL HISTORY:  Subdural hematoma, nontraumatic: spontaneous  Dorsalgia of lumbar region: on pain medication /baclofen po and pump  Self-catheterizes urinary bladder  Anemia: chronic  Uveitis  Osteoporosis  PAD (peripheral artery disease)  Hematoma: spinal  September  treated  2018  Paraplegia: on wheelchair goes to physical therapy 2 x weekly  Aortic dissection, thoracic: Type A Repaired   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2018  UTI (urinary tract infection): stable x 3 months  TIA (transient ischemic attack)  HTN (Hypertension): on meds  History of corneal transplant: left corneal transplant on 2018  Disorder of spine: unthetethering 2 x  Presence of IVC filter:  ?  S/P aortic dissection repair: Type A Dissection repair /2009   descending aortic aneurysm repair 2016  H/O Spinal surgery: laminectomies       FAMILY HISTORY:      SOCIAL HISTORY:  Smoking: [ ] Never Smoked [ ] Former Smoker (__ packs x ___ years) [ ] Current Smoker  (__ packs x ___ years)  Substance Use: [ ] Never Used [ ] Used ____  EtOH Use:  Marital Status: [ ] Single [x ]  [ ]  [ ]   Lives with , has HHA. Wheelchair bound    Allergies    No Known Allergies    Intolerances        HOME MEDICATIONS:    REVIEW OF SYSTEMS:  Constitutional: [x ] negative [ ] fevers [ ] chills [ ] weight loss [ ] weight gain  HEENT: [x ] negative [ ] dry eyes [ ] eye irritation [ ] postnasal drip [ ] nasal congestion  CV: [x ] negative  [ ] chest pain [ ] orthopnea [ ] palpitations [ ] murmur  Resp: [x ] negative [ ] cough [ ] shortness of breath [ ] dyspnea [ ] wheezing [ ] sputum [ ] hemoptysis  GI: [x ] negative [ ] nausea [ ] vomiting [ ] diarrhea [ ] constipation [ ] abd pain [ ] dysphagia   : [x ] negative [ ] dysuria [ ] nocturia [ ] hematuria [ ] increased urinary frequency  Musculoskeletal: [ ] negative [x ] back pain [ ] myalgias [ ] arthralgias [ ] fracture  Skin: [x ] negative [ ] rash [ ] itch  Neurological: [ x] negative [ ] headache [ ] dizziness [ ] syncope [ ] weakness [ ] numbness  Psychiatric: [x ] negative [ ] anxiety [ ] depression  Endocrine: [x ] negative [ ] diabetes [ ] thyroid problem  Hematologic/Lymphatic: [x ] negative [ ] anemia [ ] bleeding problem  Allergic/Immunologic: [ x] negative [ ] itchy eyes [ ] nasal discharge [ ] hives [ ] angioedema  [ ] All other systems negative  [ ] Unable to assess ROS because ________    OBJECTIVE:  ICU Vital Signs Last 24 Hrs  T(C): 36.6 (27 Sep 2019 23:07), Max: 36.9 (27 Sep 2019 17:00)  T(F): 97.9 (27 Sep 2019 23:07), Max: 98.4 (27 Sep 2019 17:00)  HR: 96 (28 Sep 2019 00:00) (81 - 125)  BP: 100/67 (27 Sep 2019 23:07) (80/44 - 152/72)  BP(mean): --  ABP: --  ABP(mean): --  RR: 18 (27 Sep 2019 23:07) (17 - 20)  SpO2: 99% (27 Sep 2019 23:07) (94% - 99%)      CAPILLARY BLOOD GLUCOSE    PHYSICAL EXAM:  General: WN/WD NAD  Neurology: A&Ox3, moves b/l UE  Eyes: L eye corneal transplant, R pupil reactive  HEENT: Neck supple, trachea midline, No JVD, Gross hearing intact  Respiratory: CTA B/L, No wheezing, rales, rhonchi, , on 2L NC  CV: RRR, S1S2, no murmurs, rubs or gallops  Abdominal: Soft, NT, ND +BS, no guarding, no distension  Extremities: No edema, + peripheral pulses  Skin: No Rashes, Hematoma, Ecchymosis    Lines/drains/airway:     LINES:     HOSPITAL MEDICATIONS:  MEDICATIONS  (STANDING):  pantoprazole Infusion 8 mG/Hr (10 mL/Hr) IV Continuous <Continuous>    MEDICATIONS  (PRN):      LABS:                        6.8    9.6   )-----------( 593      ( 27 Sep 2019 22:54 )             21.5     Hgb Trend: 6.8<--, 6.9<--, 8.6<--, 9.2<--, 9.1<--      141  |  109<H>  |  22  ----------------------------<  137<H>  4.3   |  23  |  0.54    Ca    7.8<L>      27 Sep 2019 22:54    TPro  4.7<L>  /  Alb  2.4<L>  /  TBili  0.2  /  DBili  x   /  AST  12  /  ALT  6<L>  /  AlkPhos  66      Creatinine Trend: 0.54<--, 0.57<--, 0.40<--, 0.43<--, 0.48<--, 0.40<--  PT/INR - ( 27 Sep 2019 22:54 )   PT: 12.6 sec;   INR: 1.10 ratio         PTT - ( 27 Sep 2019 22:54 )  PTT:30.2 sec  Urinalysis Basic - ( 27 Sep 2019 18:41 )    Color: Yellow / Appearance: very cloudy / S.010 / pH: x  Gluc: x / Ketone: Negative  / Bili: Negative / Urobili: Negative mg/dL   Blood: x / Protein: 30 mg/dL / Nitrite: Positive   Leuk Esterase: Moderate / RBC: 25-50 /HPF / WBC >50   Sq Epi: x / Non Sq Epi: Moderate / Bacteria: Many        MICROBIOLOGY:     RADIOLOGY:  [ ] Reviewed and interpreted by me    EKG: CHIEF COMPLAINT: GIB    HPI: 67y/o F with PMHx aortic dissection (s/p several years prior), 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 Recently discharged from [facility]  for UGIB. At OSH ED she received 1u pRBC and 1L IVF. Transferred to [facility] ED for GI evaluation. Pt had a visiting nurse come yesterday who found low BP and sent her to the ED. She reports she is completely asymptomatic, denying dizziness, vision change, CP, SOB, N/V/D, abd pain, abd distension, hematochezia, melena. Reports she has been eating and drinking normal amounts. She states she has never had red blood or black tarry stool.    Recent admission 2019- for anemia 2/2 UGIB requiring >12u pRBC. Multiple EGDs could not find a source, suspected 2/2 Dieulafoy lesion. MICU stay for intubation due to long EGD for gastric washout. Treated for Pseudomonas and ESBL Klebsiella UTI with meropenem. Discharged with Hg 8.6-9.2 and SBP 120s-140s. After 2u pRBC and 1L IVF total, BP improved from 80/44 to 101/61.      PAST MEDICAL & SURGICAL HISTORY:  Subdural hematoma, nontraumatic: spontaneous  Dorsalgia of lumbar region: on pain medication /baclofen po and pump  Self-catheterizes urinary bladder  Anemia: chronic  Uveitis  Osteoporosis  PAD (peripheral artery disease)  Hematoma: spinal  September  treated  2018  Paraplegia: on wheelchair goes to physical therapy 2 x weekly  Aortic dissection, thoracic: Type A Repaired   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2018  UTI (urinary tract infection): stable x 3 months  TIA (transient ischemic attack)  HTN (Hypertension): on meds  History of corneal transplant: left corneal transplant on 2018  Disorder of spine: unthetethering 2 x  Presence of IVC filter:  ?  S/P aortic dissection repair: Type A Dissection repair /   descending aortic aneurysm repair 2016  H/O Spinal surgery: laminectomies       FAMILY HISTORY:noncontributory      SOCIAL HISTORY:  Smoking: [ ] Never Smoked [ ] Former Smoker (__ packs x ___ years) [ ] Current Smoker  (__ packs x ___ years)  Substance Use: [ ] Never Used [ ] Used ____  EtOH Use:  Marital Status: [ ] Single [x ]  [ ]  [ ]   Lives with , has HHA. Wheelchair bound    Allergies  No Known Allergies      REVIEW OF SYSTEMS:  Constitutional: [x ] negative [ ] fevers [ ] chills [ ] weight loss [ ] weight gain  HEENT: [x ] negative [ ] dry eyes [ ] eye irritation [ ] postnasal drip [ ] nasal congestion  CV: [x ] negative  [ ] chest pain [ ] orthopnea [ ] palpitations [ ] murmur  Resp: [x ] negative [ ] cough [ ] shortness of breath [ ] dyspnea [ ] wheezing [ ] sputum [ ] hemoptysis  GI: [x ] negative [ ] nausea [ ] vomiting [ ] diarrhea [ ] constipation [ ] abd pain [ ] dysphagia   : [x ] negative [ ] dysuria [ ] nocturia [ ] hematuria [ ] increased urinary frequency  Musculoskeletal: [ ] negative [x ] back pain [ ] myalgias [ ] arthralgias [ ] fracture  Skin: [x ] negative [ ] rash [ ] itch  Neurological: [ x] negative [ ] headache [ ] dizziness [ ] syncope [ ] weakness [ ] numbness  Psychiatric: [x ] negative [ ] anxiety [ ] depression  Endocrine: [x ] negative [ ] diabetes [ ] thyroid problem  Hematologic/Lymphatic: [x ] negative [ ] anemia [ ] bleeding problem  Allergic/Immunologic: [ x] negative [ ] itchy eyes [ ] nasal discharge [ ] hives [ ] angioedema  [x ] All other systems negative  [ ] Unable to assess ROS because ________    OBJECTIVE:  ICU Vital Signs Last 24 Hrs  T(C): 36.6 (27 Sep 2019 23:07), Max: 36.9 (27 Sep 2019 17:00)  T(F): 97.9 (27 Sep 2019 23:07), Max: 98.4 (27 Sep 2019 17:00)  HR: 96 (28 Sep 2019 00:00) (81 - 125)  BP: 100/67 (27 Sep 2019 23:07) (80/44 - 152/72)  RR: 18 (27 Sep 2019 23:07) (17 - 20)  SpO2: 99% (27 Sep 2019 23:07) (94% - 99%)      PHYSICAL EXAM:  General: WN/WD NAD  Neurology: A&Ox3, moves b/l UE  Eyes: L eye corneal transplant, R pupil reactive  HEENT: Neck supple, trachea midline, No JVD, Gross hearing intact  Respiratory: CTA B/L, No wheezing, rales, rhonchi, , on 2L NC  CV: RRR, S1S2, no murmurs, rubs or gallops  Abdominal: Soft, NT, ND +BS, no guarding, no distension  Extremities: No edema, + peripheral pulses  Skin: No Rashes, Hematoma, Ecchymosis    LINES:     HOSPITAL MEDICATIONS:  MEDICATIONS  (STANDING):  pantoprazole Infusion 8 mG/Hr (10 mL/Hr) IV Continuous <Continuous>    MEDICATIONS  (PRN):      LABS:                        6.8    9.6   )-----------( 593      ( 27 Sep 2019 22:54 )             21.5     Hgb Trend: 6.8<--, 6.9<--, 8.6<--, 9.2<--, 9.1<--      141  |  109<H>  |  22  ----------------------------<  137<H>  4.3   |  23  |  0.54    Ca    7.8<L>      27 Sep 2019 22:54    TPro  4.7<L>  /  Alb  2.4<L>  /  TBili  0.2  /  DBili  x   /  AST  12  /  ALT  6<L>  /  AlkPhos  66      Creatinine Trend: 0.54<--, 0.57<--, 0.40<--, 0.43<--, 0.48<--, 0.40<--  PT/INR - ( 27 Sep 2019 22:54 )   PT: 12.6 sec;   INR: 1.10 ratio         PTT - ( 27 Sep 2019 22:54 )  PTT:30.2 sec  Urinalysis Basic - ( 27 Sep 2019 18:41 )    Color: Yellow / Appearance: very cloudy / S.010 / pH: x  Gluc: x / Ketone: Negative  / Bili: Negative / Urobili: Negative mg/dL   Blood: x / Protein: 30 mg/dL / Nitrite: Positive   Leuk Esterase: Moderate / RBC: 25-50 /HPF / WBC >50   Sq Epi: x / Non Sq Epi: Moderate / Bacteria: Many

## 2019-09-27 NOTE — ED PROVIDER NOTE - CLINICAL SUMMARY MEDICAL DECISION MAKING FREE TEXT BOX
68F w/ pmh aortic dissection (post-repair several years prior), spinal cord hematoma (now functionally paraplegic, she can move her left leg), chronic spasticity and pain (on PRN Oxycodone and has Baclofen pump), HTN, nephrolithiasis s/p left urethral stent xfer'd from [place] for UGIB. Pt appears pale and ill though awake and alert. Tachycardic 120s and bp 80s/50s. Abd soft NTND, afebrile. Normal color stool w/o active bleeding. Will recheck labs, 2u PRBC stat, c/w protonix, c/s GI and MICU, admit

## 2019-09-27 NOTE — ED PROVIDER NOTE - PMH
Anemia  chronic  Aortic dissection, thoracic  Type A Repaired 2009  Blindness of left eye  hx corneal transplant 2018  Aug.2018  Dorsalgia of lumbar region  on pain medication /baclofen po and pump  Hematoma  spinal  September  treated  September 2018  HTN (Hypertension)  on meds  Osteoporosis    PAD (peripheral artery disease)    Paraplegia  on wheelchair goes to physical therapy 2 x weekly  Self-catheterizes urinary bladder    Subdural hematoma, nontraumatic  spontaneous  TIA (transient ischemic attack)    UTI (urinary tract infection)  stable x 3 months  Uveitis

## 2019-09-27 NOTE — ED PROVIDER NOTE - OBJECTIVE STATEMENT
67 yo female with a PMH of thoracic aortic dissection, UGI (last episode was a few weeks ago and pt was transferred to [facility]), blind in the L eye, back spasm has a pump of baclofen for pain presents with low hemoglobin. After pt was d/c home from [facility] she has a nurse that comes to the house for frequent H/H checks and was called by her pmd and told that her hemoglobin was 7 and to go to the hospital. LBM was yesterday and green in color. Denies fever, abd pain, n/v/d/sweating, cp, sob.

## 2019-09-27 NOTE — ED ADULT NURSE NOTE - NSIMPLEMENTINTERV_GEN_ALL_ED
Implemented All Fall Risk Interventions:  [place] to call system. Call bell, personal items and telephone within reach. Instruct patient to call for assistance. Room bathroom lighting operational. Non-slip footwear when patient is off stretcher. Physically safe environment: no spills, clutter or unnecessary equipment. Stretcher in lowest position, wheels locked, appropriate side rails in place. Provide visual cue, wrist band, yellow gown, etc. Monitor gait and stability. Monitor for mental status changes and reorient to person, place, and time. Review medications for side effects contributing to fall risk. Reinforce activity limits and safety measures with patient and family.

## 2019-09-28 NOTE — H&P ADULT - PROBLEM SELECTOR PLAN 6
DVT ppx: SCDs; hold pharmacologic ppx in setting of GIB  NPO DVT ppx: SCDs; hold pharmacologic ppx in setting of GIB  NPO except meds

## 2019-09-28 NOTE — CONSULT NOTE ADULT - SUBJECTIVE AND OBJECTIVE BOX
68y F w/ PMH HTN, aortic dissection (s/p repair several years prior), spinal cord hematoma (now functionally paraplegic, she can move her left leg), chronic spasticity and pain (on Baclofen pump and PRN oxycodone), nephrolithiasis s/p L ureteral stent, endothelitis/keratitis s/p L corneal transplant, history of recurrent UGIB transferred from [facility] for evaluation of recurrent anemia. Pt presented to OSH after home bloodwork by visiting nurse to monitor CBC revealed a Hb of 7 from 8.6 three days prior. Denies any hematemesis, hematochezia, or melena seen by patient, , or HHA since patient discharged 3 days ago. At OSH, Hb 6.9, FOBT+. Given 1U PRBC and started on protonix gtt. En route became tachycardic to 130s and hypotensive 80s/60s. Patient was given IVF, Zofran for nausea. Previously endorsed lightheadedness. Currently denies any symptoms acute symptoms including fevers, chills, headaches, dizziness, cp, palpitations, cough, sob, hematemesis, abd pain, n/v/c/d, BRBPR, melena.    Pt was admitted earlier this month after being transferred from [place] for GIB of unclear source despite 4 EGDs and a CT angiogram. Patient required 10U PRBC at that admission in [place]. At [facility], EGD on  showed acute gastritis, gastric polyps with no source of GIB identified. Enteroscopy  showed normal jejunum. Capsule endoscopy  showed large amount of blood that appeared to be coming from the distal aspect of the stomach. Repeat EGD on  after a bloody BM showed no evidence of active bleeding. On  patient was tachycardic to 140s, NGT aspiration showed 2-3cc of bright red blood. Repeat EGD  did not show active bleeding sites. CTA A/P  negative for source of bleeding.  Surgery recommended ex-lap which was deferred. Patient was transfused 8U of PRBC for recurrent acute blood loss anemia.    ----------------------------  Pt seen and examined this morning  No melena or hematochezia overnight - last Hb ~9  She has no complaints this morning aside from chronic back pain  She feels hungry and would like to eat      PAST MEDICAL & SURGICAL HISTORY:  Subdural hematoma, nontraumatic: spontaneous  Dorsalgia of lumbar region: on pain medication /baclofen po and pump  Self-catheterizes urinary bladder  Anemia: chronic  Uveitis  Osteoporosis  PAD (peripheral artery disease)  Hematoma: spinal  September  treated  2018  Paraplegia: on wheelchair goes to physical therapy 2 x weekly  Aortic dissection, thoracic: Type A Repaired   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2018  UTI (urinary tract infection): stable x 3 months  TIA (transient ischemic attack)  HTN (Hypertension): on meds  History of corneal transplant: left corneal transplant on 2018  Disorder of spine: unthetethering 2 x  Presence of IVC filter:  ?  S/P aortic dissection repair: Type A Dissection repair /   descending aortic aneurysm repair 2016  H/O Spinal surgery: laminectomies       MEDICATIONS  (STANDING):  atorvastatin 40 milliGRAM(s) Oral at bedtime  docusate sodium 100 milliGRAM(s) Oral three times a day  DULoxetine 30 milliGRAM(s) Oral daily  gabapentin 800 milliGRAM(s) Oral three times a day  influenza   Vaccine 0.5 milliLiter(s) IntraMuscular once  lidocaine   Patch 1 Patch Transdermal daily  pantoprazole Infusion 8 mG/Hr (10 mL/Hr) IV Continuous <Continuous>  prednisoLONE acetate 1% Suspension 1 Drop(s) Both EYES four times a day  valACYclovir 1000 milliGRAM(s) Oral three times a day    MEDICATIONS  (PRN):  acetaminophen   Tablet .. 650 milliGRAM(s) Oral every 6 hours PRN Mild Pain (1 - 3)  artificial  tears Solution 1 Drop(s) Both EYES every 2 hours PRN Dry Eyes  oxyCODONE    IR 10 milliGRAM(s) Oral three times a day PRN Severe Pain (7 - 10)  senna 2 Tablet(s) Oral at bedtime PRN Constipation  sodium chloride 2% Ophthalmic Solution 1 Drop(s) Left EYE four times a day PRN eye pain  zolpidem 5 milliGRAM(s) Oral at bedtime PRN Insomnia      Allergies    No Known Allergies    Intolerances        Review of Systems:    General:  No wt loss, fevers, chills, night sweats,fatigue,   CV:  No pain, palpitatioins, hypo/hypertension  Resp:  No dyspnea, cough, tachypnea, wheezing  GI:  see hpi  :  No pain, bleeding, incontinence, nocturia  Muscle:  + low back pain, weakness  Neuro:  B/L LE weakness, tingling, memory problems  Psych:  No fatigue, insomnia, mood problems, depression  Endocrine:  No polyuria, polydypsia, cold/heat intolerance  Heme:  No petechiae, ecchymosis, easy bruisability  Skin:  No rash, tattoos, scars, edema      Vital Signs Last 24 Hrs  T(C): 36.8 (28 Sep 2019 07:35), Max: 37 (28 Sep 2019 04:30)  T(F): 98.3 (28 Sep 2019 07:35), Max: 98.6 (28 Sep 2019 04:30)  HR: 97 (28 Sep 2019 07:35) (81 - 125)  BP: 129/73 (28 Sep 2019 07:35) (80/44 - 152/72)  BP(mean): --  RR: 18 (28 Sep 2019 07:35) (16 - 20)  SpO2: 94% (28 Sep 2019 07:35) (94% - 100%)    PHYSICAL EXAM:    Constitutional: NAD, ill appearing middle aged woamn  Neck: No LAD, supple  Respiratory: CTA and P  Cardiovascular: S1 and S2, RRR, no M  Gastrointestinal: BS+, soft, NT/ND, neg HSM,  Brown stool on external perianal area  Neurological: A/O x 3  Psychiatric: Normal mood, normal affect  Skin: No rashes      LABS:                        8.6    10.2  )-----------( 408      ( 28 Sep 2019 11:12 )             26.8                         9.0    10.7  )-----------( 381      ( 28 Sep 2019 01:44 )             27.5                         6.8    9.6   )-----------( 593      ( 27 Sep 2019 22:54 )             21.5     09-27    141  |  109<H>  |  22  ----------------------------<  137<H>  4.3   |  23  |  0.54    Ca    7.8<L>      27 Sep 2019 22:54    TPro  4.7<L>  /  Alb  2.4<L>  /  TBili  0.2  /  DBili  x   /  AST  12  /  ALT  6<L>  /  AlkPhos  66  09-27    PT/INR - ( 27 Sep 2019 22:54 )   PT: 12.6 sec;   INR: 1.10 ratio         PTT - ( 27 Sep 2019 22:54 )  PTT:30.2 sec  Urinalysis Basic - ( 27 Sep 2019 18:41 )    Color: Yellow / Appearance: very cloudy / S.010 / pH: x  Gluc: x / Ketone: Negative  / Bili: Negative / Urobili: Negative mg/dL   Blood: x / Protein: 30 mg/dL / Nitrite: Positive   Leuk Esterase: Moderate / RBC: 25-50 /HPF / WBC >50   Sq Epi: x / Non Sq Epi: Moderate / Bacteria: Many      LIVER FUNCTIONS - ( 27 Sep 2019 22:54 )  Alb: 2.4 g/dL / Pro: 4.7 g/dL / ALK PHOS: 66 U/L / ALT: 6 U/L / AST: 12 U/L / GGT: x         LIVER FUNCTIONS - ( 27 Sep 2019 18:41 )  Alb: 2.3 g/dL / Pro: 5.5 gm/dL / ALK PHOS: 81 U/L / ALT: 10 U/L / AST: 11 U/L / GGT: x                 RADIOLOGY & ADDITIONAL TESTS:

## 2019-09-28 NOTE — PROGRESS NOTE ADULT - ASSESSMENT
68y F w/ PMH HTN, aortic dissection s/p repair, spinal cord hematoma c/b functional paraplegia, chronic spasticity and pain on baclofen pump, history of recurrent acute blood loss anemia a/w UGIB transferred from [facility] for endoscopic evaluation. 68y F w/ PMH HTN, aortic dissection s/p repair, spinal cord hematoma c/b functional paraplegia, chronic spasticity and pain on baclofen pump, history of recurrent acute blood loss anemia a/w UGIB transferred from [facility] for endoscopic evaluation. Dr. Ambrocio's office (private GI) updated.

## 2019-09-28 NOTE — PATIENT PROFILE ADULT - FALL HARM RISK
yes bones(Osteoporosis,prev fx,steroid use,metastatic bone ca)/other/coagulation(Bleeding disorder R/T clinical cond/anti-coags)/paraplegia

## 2019-09-28 NOTE — H&P ADULT - PROBLEM SELECTOR PLAN 1
Multiple recent episodes of upper GI bleeding without clear source of bleed on numerous recent EGDs, VCE, CTA  - currently HD stable and asymptomatic  - s/p 2U PRBC with appropriate response  - monitor CBC q8, maintain active T&S, transfuse for Hb <7  - GI c/s email placed Multiple recent episodes of upper GI bleeding without clear source of bleed on numerous recent EGDs, VCE, CTA  - currently HD stable and asymptomatic  - s/p 2U PRBC with appropriate response  - monitor CBC q8, maintain active T&S, transfuse for Hb <7  - call outpt GI Dr. Ambrocio in AM Multiple recent episodes of upper GI bleeding without clear source of bleed on numerous recent EGDs, VCE, CTA  - currently HD stable and asymptomatic  - s/p 2U PRBC with appropriate response  - c/w protonix gtt  - monitor CBC q8, maintain active T&S, transfuse for Hb <7  - call outpt GI Dr. Ambrocio in AM Multiple recent episodes of upper GI bleeding without clear source of bleed on numerous recent EGDs, VCE, CTA  - currently HD stable and asymptomatic  - s/p 2U PRBC with appropriate response  - c/w protonix gtt  - monitor CBC q8, maintain active T&S, transfuse for Hb <7  - call outpt GI Dr. Ambrocio in AM as pt is currently hemodynamically stable  -multiple prior CTA nondiagnostic, given no active bleeding currently, will hold off for now.

## 2019-09-28 NOTE — PROGRESS NOTE ADULT - SUBJECTIVE AND OBJECTIVE BOX
COVERING RESIDENT: Ashley Webster (PGY-1) | NS PAGER [phone] (NS) | [facility] PAGER [phone]    GENI MONSALVENE  MRN-[id number]    INTERVAL HPI/OVERNIGHT EVENTS: no acute events overnight.    SUBJECTIVE: Patient seen and examined at bedside.     CONSTITUTIONAL: No weakness, fevers or chills  EYES/ENT: No visual changes;  No vertigo or throat pain   NECK: No pain or stiffness  RESPIRATORY: No cough, wheezing, hemoptysis; No shortness of breath  CARDIOVASCULAR: No chest pain or palpitations  GASTROINTESTINAL: No abdominal or epigastric pain. No nausea, vomiting, or hematemesis; No diarrhea or constipation. No melena or hematochezia.  GENITOURINARY: No dysuria, frequency or hematuria  NEUROLOGICAL: No numbness or weakness  SKIN: No itching, rashes    OBJECTIVE:    VITAL SIGNS:  ICU Vital Signs Last 24 Hrs  T(C): 36.8 (28 Sep 2019 07:35), Max: 37 (28 Sep 2019 04:30)  T(F): 98.3 (28 Sep 2019 07:35), Max: 98.6 (28 Sep 2019 04:30)  HR: 97 (28 Sep 2019 07:35) (81 - 125)  BP: 129/73 (28 Sep 2019 07:35) (80/44 - 152/72)  BP(mean): --  ABP: --  ABP(mean): --  RR: 18 (28 Sep 2019 07:35) (16 - 20)  SpO2: 94% (28 Sep 2019 07:35) (94% - 100%)         @ 07:01  -   @ 07:00  --------------------------------------------------------  IN: 160 mL / OUT: 290 mL / NET: -130 mL      CAPILLARY BLOOD GLUCOSE            PHYSICAL EXAM:    GENERAL: no acute distress  HEENT: NC/AT, EOMI, neck supple, MMM  RESPIRATORY: LCTAB/L, no rhonchi, rales, or wheezing  CARDIOVASCULAR: RRR, no murmurs, gallops, rubs  ABDOMINAL: soft, non-tender, non-distended, positive bowel sounds   EXTREMITIES: no clubbing, cyanosis, or edema  NEUROLOGICAL: alert and oriented x 3, non-focal  SKIN: no rashes or lesions   MUSCULOSKELETAL: no gross joint deformity    MEDICATIONS:  MEDICATIONS  (STANDING):  atorvastatin 40 milliGRAM(s) Oral at bedtime  docusate sodium 100 milliGRAM(s) Oral three times a day  DULoxetine 30 milliGRAM(s) Oral daily  gabapentin 800 milliGRAM(s) Oral three times a day  influenza   Vaccine 0.5 milliLiter(s) IntraMuscular once  lidocaine   Patch 1 Patch Transdermal daily  pantoprazole Infusion 8 mG/Hr (10 mL/Hr) IV Continuous <Continuous>  prednisoLONE acetate 1% Suspension 1 Drop(s) Both EYES four times a day  valACYclovir 1000 milliGRAM(s) Oral three times a day    MEDICATIONS  (PRN):  acetaminophen   Tablet .. 650 milliGRAM(s) Oral every 6 hours PRN Mild Pain (1 - 3)  artificial  tears Solution 1 Drop(s) Both EYES every 2 hours PRN Dry Eyes  oxyCODONE    IR 10 milliGRAM(s) Oral three times a day PRN Severe Pain (7 - 10)  senna 2 Tablet(s) Oral at bedtime PRN Constipation  sodium chloride 2% Ophthalmic Solution 1 Drop(s) Left EYE four times a day PRN eye pain  zolpidem 5 milliGRAM(s) Oral at bedtime PRN Insomnia      ALLERGIES:  Allergies    No Known Allergies    Intolerances        LABS:                        9.0    10.7  )-----------( 381      ( 28 Sep 2019 01:44 )             27.5         141  |  109<H>  |  22  ----------------------------<  137<H>  4.3   |  23  |  0.54    Ca    7.8<L>      27 Sep 2019 22:54    TPro  4.7<L>  /  Alb  2.4<L>  /  TBili  0.2  /  DBili  x   /  AST  12  /  ALT  6<L>  /  AlkPhos  66      PT/INR - ( 27 Sep 2019 22:54 )   PT: 12.6 sec;   INR: 1.10 ratio         PTT - ( 27 Sep 2019 22:54 )  PTT:30.2 sec  Urinalysis Basic - ( 27 Sep 2019 18:41 )    Color: Yellow / Appearance: very cloudy / S.010 / pH: x  Gluc: x / Ketone: Negative  / Bili: Negative / Urobili: Negative mg/dL   Blood: x / Protein: 30 mg/dL / Nitrite: Positive   Leuk Esterase: Moderate / RBC: 25-50 /HPF / WBC >50   Sq Epi: x / Non Sq Epi: Moderate / Bacteria: Many          RADIOLOGY & ADDITIONAL TESTS: Reviewed. COVERING RESIDENT: Ashley Webster (PGY-1) | NS PAGER [phone] (NS) | [facility]AG[facility] [phone]    BRENT MONSALVE  MRN-[id number]    INTERVAL HPI/OVERNIGHT EVENTS: no acute events overnight.    SUBJECTIVE: Patient seen and examined at bedside. Patient endorses having pain (hx of chronic pain), no other complaints. No fever, chills, SOB, CP, abdominal pain, nausea, vomiting, diarrhea.     OBJECTIVE:    VITAL SIGNS:  ICU Vital Signs Last 24 Hrs  T(C): 36.8 (28 Sep 2019 07:35), Max: 37 (28 Sep 2019 04:30)  T(F): 98.3 (28 Sep 2019 07:35), Max: 98.6 (28 Sep 2019 04:30)  HR: 97 (28 Sep 2019 07:35) (81 - 125)  BP: 129/73 (28 Sep 2019 07:35) (80/44 - 152/72)  BP(mean): --  ABP: --  ABP(mean): --  RR: 18 (28 Sep 2019 07:35) (16 - 20)  SpO2: 94% (28 Sep 2019 07:35) (94% - 100%)         @ 07:01  -   @ 07:00  --------------------------------------------------------  IN: 160 mL / OUT: 290 mL / NET: -130 mL      CAPILLARY BLOOD GLUCOSE            PHYSICAL EXAM:    GENERAL: no acute distress, thin and ill-appearing  HEENT: NC/AT, EOMI, neck supple, MMM  RESPIRATORY: LCTAB/L, no rhonchi, rales, or wheezing  CARDIOVASCULAR: RRR, no murmurs, gallops, rubs  ABDOMINAL: RLQ baclofen pump, soft, non-tender, non-distended, positive bowel sounds   EXTREMITIES: no clubbing, cyanosis, or edema  NEUROLOGICAL: alert and oriented x 3, chronic diffuse 3/5 R>LLE weakness  SKIN: sacral decubitus ulcer stage 2 (per RN)  MUSCULOSKELETAL: no gross joint deformity    MEDICATIONS:  MEDICATIONS  (STANDING):  atorvastatin 40 milliGRAM(s) Oral at bedtime  docusate sodium 100 milliGRAM(s) Oral three times a day  DULoxetine 30 milliGRAM(s) Oral daily  gabapentin 800 milliGRAM(s) Oral three times a day  influenza   Vaccine 0.5 milliLiter(s) IntraMuscular once  lidocaine   Patch 1 Patch Transdermal daily  pantoprazole Infusion 8 mG/Hr (10 mL/Hr) IV Continuous <Continuous>  prednisoLONE acetate 1% Suspension 1 Drop(s) Both EYES four times a day  valACYclovir 1000 milliGRAM(s) Oral three times a day    MEDICATIONS  (PRN):  acetaminophen   Tablet .. 650 milliGRAM(s) Oral every 6 hours PRN Mild Pain (1 - 3)  artificial  tears Solution 1 Drop(s) Both EYES every 2 hours PRN Dry Eyes  oxyCODONE    IR 10 milliGRAM(s) Oral three times a day PRN Severe Pain (7 - 10)  senna 2 Tablet(s) Oral at bedtime PRN Constipation  sodium chloride 2% Ophthalmic Solution 1 Drop(s) Left EYE four times a day PRN eye pain  zolpidem 5 milliGRAM(s) Oral at bedtime PRN Insomnia      ALLERGIES:  Allergies    No Known Allergies    Intolerances        LABS:                        9.0    10.7  )-----------( 381      ( 28 Sep 2019 01:44 )             27.5         141  |  109<H>  |  22  ----------------------------<  137<H>  4.3   |  23  |  0.54    Ca    7.8<L>      27 Sep 2019 22:54    TPro  4.7<L>  /  Alb  2.4<L>  /  TBili  0.2  /  DBili  x   /  AST  12  /  ALT  6<L>  /  AlkPhos  66      PT/INR - ( 27 Sep 2019 22:54 )   PT: 12.6 sec;   INR: 1.10 ratio         PTT - ( 27 Sep 2019 22:54 )  PTT:30.2 sec  Urinalysis Basic - ( 27 Sep 2019 18:41 )    Color: Yellow / Appearance: very cloudy / S.010 / pH: x  Gluc: x / Ketone: Negative  / Bili: Negative / Urobili: Negative mg/dL   Blood: x / Protein: 30 mg/dL / Nitrite: Positive   Leuk Esterase: Moderate / RBC: 25-50 /HPF / WBC >50   Sq Epi: x / Non Sq Epi: Moderate / Bacteria: Many          RADIOLOGY & ADDITIONAL TESTS: Reviewed.

## 2019-09-28 NOTE — H&P ADULT - NSICDXPASTMEDICALHX_GEN_ALL_CORE_FT
PAST MEDICAL HISTORY:  Anemia chronic    Aortic dissection, thoracic Type A Repaired 2009    Blindness of left eye hx corneal transplant 2018  Aug.2018    Dorsalgia of lumbar region on pain medication /baclofen po and pump    Hematoma spinal  September  treated  September 2018    HTN (Hypertension) on meds    Osteoporosis     PAD (peripheral artery disease)     Paraplegia on wheelchair goes to physical therapy 2 x weekly    Self-catheterizes urinary bladder     Subdural hematoma, nontraumatic spontaneous    TIA (transient ischemic attack)     UTI (urinary tract infection) stable x 3 months    Uveitis

## 2019-09-28 NOTE — H&P ADULT - PROBLEM SELECTOR PLAN 3
On baclofen pump  - c/w home regimen duloxetine 30 BID, gabapentin 800 TID, lidocaine patch, and PRN oxy 10 TID for severe pain

## 2019-09-28 NOTE — H&P ADULT - PROBLEM SELECTOR PLAN 5
endothelitis/keratitis c/b L eye blindness s/p L corneal transplant  - c/w valtrex  - c/w prednisolone 1%  - divina and artificial tears PRN normal

## 2019-09-28 NOTE — CONSULT NOTE ADULT - ASSESSMENT
68y F w/ PMH HTN, aortic dissection (s/p repair several years ago with Dr. Barriga), spinal cord hematoma (now functionally paraplegic, she can move her left leg), chronic spasticity and pain (on Baclofen pump and PRN oxycodone), nephrolithiasis s/p L ureteral stent, endothelitis/keratitis s/p L corneal transplant, history of recurrent UGIB with unknown source    - Patient currently stable, if hypotensive, tachycardic or downtrending hematocrit or grossly bleeding would repeat CTA to evaluate for source of bleeding.  Has had prior CTAs that do not show evidence for entero aortic fistula  - If continues to bleed and cannot find source , consider push enteroscopy in the OR.     D/W Dr. Godoy

## 2019-09-28 NOTE — CONSULT NOTE ADULT - ASSESSMENT
69 yo F w/ multiple episode of upper GI bleeding over the past 6-8 week with presence of blood in stomach however no etiology for bleeding found on > 6 endoscopies  There is a concern for aortoenteric fistula however the imaging nor the endoscopies have shown any evidence of such a fistula  At this time patient is NOT bleeding actively and has greenish brown stool. She has had a stable H/H this AM  -Continue PPI gtt  -Monitor H/H BID  -Advance to clear liquid diet  - Urgent CT Angiogram in the event of brisk bleeding to localize source of bleeding - YAQUELIN patient's RN and primary team.  - Surgical evaluation appreciated  - Will follow closely    [phone]

## 2019-09-28 NOTE — H&P ADULT - NSHPREVIEWOFSYSTEMS_GEN_ALL_CORE
General: No fevers, chills  HEENT: No headaches, dizziness, rhinorrhea, sore throat, dysphagia  CVS: No chest pain, palpitations  Resp: No cough, dyspnea  GI: No abdominal pain, nausea, vomiting, constipation, diarrhea, hematochezia, melena  : No dysuria, frequency, hematuria  MSK: No joint pain or swelling  Ext: No edema  Skin: No rashes or itching  Heme: No easy bruising or petechiae  Neuro: + chronic R>LLE weakness; No confusion, numbness, or loss of consciousness  Endocrine: No excessive heat or cold symptoms, polyuria, polydipsia

## 2019-09-28 NOTE — H&P ADULT - ASSESSMENT
68y F w/ PMH HTN, aortic dissection (s/p repair several years prior), spinal cord hematoma (now functionally paraplegic), chronic spasticity and pain (on Baclofen pump and PRN oxycodone), history of recurrent UGIB transferred from [facility] for evaluation of recurrent anemia. Pt presented to OSH after home 68y F w/ PMH HTN, aortic dissection s/p repair, spinal cord hematoma c/b functional paraplegia, chronic spasticity and pain on baclofen pump, history of recurrent acute blood loss anemia a/w UGIB transferred from [facility] for endoscopic evaluation.

## 2019-09-28 NOTE — H&P ADULT - NSHPPHYSICALEXAM_GEN_ALL_CORE
Vital Signs Last 24 Hrs  T(C): 36.6 (28 Sep 2019 01:46), Max: 36.9 (27 Sep 2019 17:00)  T(F): 97.8 (28 Sep 2019 01:46), Max: 98.4 (27 Sep 2019 17:00)  HR: 82 (28 Sep 2019 01:46) (81 - 125)  BP: 113/72 (28 Sep 2019 01:46) (80/44 - 152/72)  BP(mean): --  RR: 18 (28 Sep 2019 01:46) (16 - 20)  SpO2: 100% (28 Sep 2019 01:46) (94% - 100%)    Physical Exam:  Gen: Alert, NAD  HEENT: NCAT, L corneal transplant, L pupil decreased reactivity, EOMI, clear conjunctiva, no scleral icterus, no erythema or exudates in the oropharynx, mmm  Neck: Supple, no JVD, no LAD  CV: RRR, S1S2, no m/r/g  Resp: CTAB, normal respiratory effort  Abd: Soft, NT, ND, normal bowel sounds, RLQ baclofen pump  Ext: + peripheral pulses, no clubbing, cyanosis or edema  Neuro: AOx3, CN2-12 grossly intact, chronic diffuse 3/5 R>LLE weakness  Skin: no rashes, ecchymoses, petechiae Vital Signs Last 24 Hrs  T(C): 36.6 (28 Sep 2019 01:46), Max: 36.9 (27 Sep 2019 17:00)  T(F): 97.8 (28 Sep 2019 01:46), Max: 98.4 (27 Sep 2019 17:00)  HR: 82 (28 Sep 2019 01:46) (81 - 125)  BP: 113/72 (28 Sep 2019 01:46) (80/44 - 152/72)  BP(mean): --  RR: 18 (28 Sep 2019 01:46) (16 - 20)  SpO2: 100% (28 Sep 2019 01:46) (94% - 100%)    Physical Exam:  Gen: Alert, NAD  HEENT: NCAT, L corneal transplant, L pupil decreased reactivity, EOMI, clear conjunctiva, no scleral icterus, no erythema or exudates in the oropharynx, mmm  Neck: Supple, no JVD, no LAD  Heart: RRR, S1S2, no m/r/g no sig LE edema  Lung: CTAB, normal respiratory effort  Abd: Soft, NT, ND, normal bowel sounds, RLQ baclofen pump  Ext: + peripheral pulses, no clubbing, cyanosis or edema  Neuro: AOx3, CN2-12 grossly intact, chronic diffuse 3/5 R>LLE weakness  Skin: no rashes, ecchymoses, petechiae

## 2019-09-28 NOTE — H&P ADULT - NSHPSOCIALHISTORY_GEN_ALL_CORE
Lives at home with , has partial HHA, nonsmoker, infrequent etoh on social occasions, last drink years ago, no other drug use

## 2019-09-28 NOTE — PROGRESS NOTE ADULT - PROBLEM SELECTOR PLAN 1
Multiple recent episodes of upper GI bleeding without clear source of bleed on numerous recent EGDs, VCE, CTA  - currently HD stable and asymptomatic  - s/p 2U PRBC with appropriate response  - c/w protonix gtt  - monitor CBC q8, maintain active T&S, transfuse for Hb <7  - call outpt GI Dr. Ambrocio in AM as pt is currently hemodynamically stable  -multiple prior CTA nondiagnostic, given no active bleeding currently, will hold off for now. Multiple recent episodes of upper GI bleeding without clear source of bleed on numerous recent EGDs, VCE, CTA  - currently HD stable and asymptomatic  - s/p 2U PRBC with appropriate response  - c/w protonix gtt  - monitor CBC q8, maintain active T&S, transfuse for Hb <7  - call outpt GI Dr. Ambrocio in AM as pt is currently hemodynamically stable  --covered by Dr. Webster, GI attending, if active bleeding, STAT CTA abd/pelvis  -multiple prior CTA nondiagnostic, given no active bleeding currently, will hold off for now.

## 2019-09-28 NOTE — ED ADULT NURSE REASSESSMENT NOTE - NS ED NURSE REASSESS COMMENT FT1
Pt straight cathed using sterile technique.  2 RNs present.  Pt tolerated procedure well. Sterile specimen collected. Culture sent. Back Pain

## 2019-09-28 NOTE — H&P ADULT - HISTORY OF PRESENT ILLNESS
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 Pt presented to OSH after home bloodwork by visiting nurse to monitor CBC revealed a Hb of 7 from 8.6 three days prior. Denies any hematemesis, hematochezia, or melena seen by patient, , or HHA since patient discharged 3 days ago. At OSH, Hb 6.9, FOBT+. Given 1U PRBC and started on protonix gtt. En route became tachycardic to 130s and hypotensive 80s/60s. Patient was given IVF, Zofran for nausea. Previously endorsed lightheadedness. Currently denies any symptoms acute symptoms including fevers, chills, cp, palpitations, cough, sob, hematemesis, abd pain, n/v/c/d, BRBPR, melena.    Pt was admitted earlier this month after being transferred from [place] for GIB of unclear source despite 4 EGDs and a CT angiogram. Patient required 10U PRBC at that admission in [place]. At [facility], EGD on 9/5 showed acute gastritis, gastric polyps with no source of GIB identified. Enteroscopy 9/9 showed normal jejunum. Capsule endoscopy 9/11 showed large amount of blood that appeared to be coming from the distal aspect of the stomach. Repeat EGD on 9/13 after a bloody BM showed no evidence of active bleeding. On 9/17 patient was tachycardic to 140s, NGT aspiration showed 2-3cc of bright red blood. Repeat EGD 9/17 did not show active bleeding sites. CTA A/P 9/18 negative for source of bleeding.  Surgery recommended ex-lap which was deferred. Patient was transfused 8U of PRBC for recurrent acute blood loss anemia.    In ED: T95.2 -> now 97.8,  -> now 82, BP 80/44 -> now 113/72, RR 20 -> now 16, SpO2 94% on RA now 100% on 2L. Given 1U PRBC at [place], an additional 1U PRBC here, meropenem 500mg, and oxycodone 10mg. 68y F w/ PMH HTN, aortic dissection (s/p repair several years prior), spinal cord hematoma (now functionally paraplegic, she can move her left leg), chronic spasticity and pain (on Baclofen pump and PRN oxycodone), history of recurrent UGIB transferred from [facility] for evaluation of recurrent anemia. Pt presented to OSH after home bloodwork by visiting nurse to monitor CBC revealed a Hb of 7 from 8.6 three days prior. Denies any hematemesis, hematochezia, or melena seen by patient, , or HHA since patient discharged 3 days ago. At OSH, Hb 6.9, FOBT+. Given 1U PRBC and started on protonix gtt. En route became tachycardic to 130s and hypotensive 80s/60s. Patient was given IVF, Zofran for nausea. Previously endorsed lightheadedness. Currently denies any symptoms acute symptoms including fevers, chills, headaches, dizziness, cp, palpitations, cough, sob, hematemesis, abd pain, n/v/c/d, BRBPR, melena.    Pt was admitted earlier this month after being transferred from [place] for GIB of unclear source despite 4 EGDs and a CT angiogram. Patient required 10U PRBC at that admission in [place]. At [facility], EGD on 9/5 showed acute gastritis, gastric polyps with no source of GIB identified. Enteroscopy 9/9 showed normal jejunum. Capsule endoscopy 9/11 showed large amount of blood that appeared to be coming from the distal aspect of the stomach. Repeat EGD on 9/13 after a bloody BM showed no evidence of active bleeding. On 9/17 patient was tachycardic to 140s, NGT aspiration showed 2-3cc of bright red blood. Repeat EGD 9/17 did not show active bleeding sites. CTA A/P 9/18 negative for source of bleeding.  Surgery recommended ex-lap which was deferred. Patient was transfused 8U of PRBC for recurrent acute blood loss anemia.    In ED: T95.2 -> now 97.8,  -> now 82, BP 80/44 -> now 113/72, RR 20 -> now 16, SpO2 94% on RA now 100% on 2L. Given 1U PRBC at [place], an additional 1U PRBC here, meropenem 500mg, and oxycodone 10mg. 68y F w/ PMH HTN, aortic dissection (s/p repair several years prior), spinal cord hematoma (now functionally paraplegic, she can move her left leg), chronic spasticity and pain (on Baclofen pump and PRN oxycodone), nephrolithiasis s/p L ureteral stent, endothelitis/keratitis s/p L corneal transplant, history of recurrent UGIB transferred from [facility] [place] for evaluation of recurrent anemia. Pt presented to OSH after home bloodwork by visiting nurse to monitor CBC revealed a Hb of 7 from 8.6 three days prior. Denies any hematemesis, hematochezia, or melena seen by patient, , or HHA since patient discharged 3 days ago. At OSH, Hb 6.9, FOBT+. Given 1U PRBC and started on protonix gtt. En route became tachycardic to 130s and hypotensive 80s/60s. Patient was given IVF, Zofran for nausea. Previously endorsed lightheadedness. Currently denies any symptoms acute symptoms including fevers, chills, headaches, dizziness, cp, palpitations, cough, sob, hematemesis, abd pain, n/v/c/d, BRBPR, melena.    Pt was admitted earlier this month after being transferred from [place] for GIB of unclear source despite 4 EGDs and a CT angiogram. Patient required 10U PRBC at that admission in [place]. At [facility], EGD on 9/5 showed acute gastritis, gastric polyps with no source of GIB identified. Enteroscopy 9/9 showed normal jejunum. Capsule endoscopy 9/11 showed large amount of blood that appeared to be coming from the distal aspect of the stomach. Repeat EGD on 9/13 after a bloody BM showed no evidence of active bleeding. On 9/17 patient was tachycardic to 140s, NGT aspiration showed 2-3cc of bright red blood. Repeat EGD 9/17 did not show active bleeding sites. CTA A/P 9/18 negative for source of bleeding.  Surgery recommended ex-lap which was deferred. Patient was transfused 8U of PRBC for recurrent acute blood loss anemia.    In ED: T95.2 -> now 97.8,  -> now 82, BP 80/44 -> now 113/72, RR 20 -> now 16, SpO2 94% on RA now 100% on 2L. Given 1U PRBC at [place], an additional 1U PRBC here, meropenem 500mg, and oxycodone 10mg.

## 2019-09-28 NOTE — H&P ADULT - NSHPLABSRESULTS_GEN_ALL_CORE
LABS: Personally reviewed labs, imaging, and ECG                          9.0    10.7  )-----------( 381      ( 28 Sep 2019 01:44 )             27.5           141  |  109<H>  |  22  ----------------------------<  137<H>  4.3   |  23  |  0.54    Ca    7.8<L>      27 Sep 2019 22:54    TPro  4.7<L>  /  Alb  2.4<L>  /  TBili  0.2  /  DBili  x   /  AST  12  /  ALT  6<L>  /  AlkPhos  66         LIVER FUNCTIONS - ( 27 Sep 2019 22:54 )  Alb: 2.4 g/dL / Pro: 4.7 g/dL / ALK PHOS: 66 U/L / ALT: 6 U/L / AST: 12 U/L / GGT: x                    Urinalysis Basic - ( 27 Sep 2019 18:41 )    Color: Yellow / Appearance: very cloudy / S.010 / pH: x  Gluc: x / Ketone: Negative  / Bili: Negative / Urobili: Negative mg/dL   Blood: x / Protein: 30 mg/dL / Nitrite: Positive   Leuk Esterase: Moderate / RBC: 25-50 /HPF / WBC >50   Sq Epi: x / Non Sq Epi: Moderate / Bacteria: Many        PT/INR - ( 27 Sep 2019 22:54 )   PT: 12.6 sec;   INR: 1.10 ratio         PTT - ( 27 Sep 2019 22:54 )  PTT:30.2 sec    Lactate Trend            CAPILLARY BLOOD GLUCOSE            Culture Results:   <10,000 CFU/mL Normal Urogenital Zora ( @ 10:11)  Culture Results:   No growth at 5 days. ( @ 03:03)  Culture Results:   No growth at 5 days. ( @ 03:03)  Culture Results:   No growth at 5 days. ( @ 01:44)  Culture Results:   No growth at 5 days. ( @ 01:44)  Culture Results:   >100,000 CFU/ml Klebsiella pneumoniae ESBL  >100,000 CFU/ml Pseudomonas aeruginosa ( @ 18:17)      RADIOLOGY & ADDITIONAL TESTS:     ECG: NSR 85bpm, incomplete RBBB seen on prior ECG, no acute ischemic pattern changes, QTc 466 LABS: Personally reviewed labs, imaging, and ECG                          9.0    10.7  )-----------( 381      ( 28 Sep 2019 01:44 )             27.5           141  |  109<H>  |  22  ----------------------------<  137<H>  4.3   |  23  |  0.54    Ca    7.8<L>      27 Sep 2019 22:54    TPro  4.7<L>  /  Alb  2.4<L>  /  TBili  0.2  /  DBili  x   /  AST  12  /  ALT  6<L>  /  AlkPhos  66         Urinalysis Basic - ( 27 Sep 2019 18:41 )    Color: Yellow / Appearance: very cloudy / S.010 / pH: x  Gluc: x / Ketone: Negative  / Bili: Negative / Urobili: Negative mg/dL   Blood: x / Protein: 30 mg/dL / Nitrite: Positive   Leuk Esterase: Moderate / RBC: 25-50 /HPF / WBC >50   Sq Epi: x / Non Sq Epi: Moderate / Bacteria: Many      PT/INR - ( 27 Sep 2019 22:54 )   PT: 12.6 sec;   INR: 1.10 ratio    PTT - ( 27 Sep 2019 22:54 )  PTT:30.2 sec      RADIOLOGY & ADDITIONAL TESTS:     ECG: NSR 85bpm, incomplete RBBB seen on prior ECG, no acute ischemic pattern changes, QTc 466

## 2019-09-28 NOTE — H&P ADULT - PROBLEM SELECTOR PLAN 2
Presented with hypothermia, tachycardia, hypotension with positive UA in setting of self-catheterizing at home from neurogenic bladder.  Recent UTI with pseudomonas and ESBL k pneumoniae sensitive to drew  - c/w drew 1g q8  - f/u UCx in lab

## 2019-09-29 NOTE — PROGRESS NOTE ADULT - ASSESSMENT
68y F w/ PMH HTN, aortic dissection s/p repair, spinal cord hematoma c/b functional paraplegia, chronic spasticity and pain on baclofen pump, history of recurrent acute blood loss anemia a/w UGIB transferred from [facility] for endoscopic evaluation. Dr. Ambrocio's office (private GI) updated.

## 2019-09-29 NOTE — PROGRESS NOTE ADULT - SUBJECTIVE AND OBJECTIVE BOX
Shy Barakat MD  PGY2 | Dept of Internal Medicine  [facility] [phone]        Patient is a 68y old  Female who presents with a chief complaint of anemia presumed 2/2 GIB (28 Sep 2019 11:32)      SUBJECTIVE / OVERNIGHT EVENTS:        Review of Systems:  General: fatigue [ ], fever/chills [ ], weakness [ ], weight loss [ ]  HEENT: headache [ ], hearing changes [ ], vision changes [ ], hoarseness [ ], sore throat [ ], nasal discharge [ ]  Cardiovascular: chest pain [ ], palpiations [ ], dyspnea on exertion [ ], orthopnea [ ], PND [ ]  Respiratory: SOB [ ], cough [ ], wheeze [ ], exercise intolerance [ ]  Gastrointestinal: abdominal pain [ ], unintentional weight loss [ ], heartburn [ ], nausea [ ], vomiting [ ], hematochezia [ ], melena [ ]  Genitourinary: dysuria [ ], frequency [ ], urgency [ ], hematuria [ ], incontinence [ ]  Skin: lesions [ ], rashes [ ]  Neuro: headache [ ], changes in senses [ ], speech problems [ ], balance problems [ ], numbness/tingling [ ], weakness [ ]          Vital Signs Last 24 Hrs  T(C): 36.9 (29 Sep 2019 04:35), Max: 37.3 (28 Sep 2019 11:30)  T(F): 98.4 (29 Sep 2019 04:35), Max: 99.2 (28 Sep 2019 11:30)  HR: 99 (29 Sep 2019 04:35) (73 - 112)  BP: 170/77 (29 Sep 2019 04:35) (113/74 - 170/77)  BP(mean): --  RR: 18 (29 Sep 2019 04:35) (18 - 18)  SpO2: 96% (29 Sep 2019 04:35) (94% - 97%)    I&O's Summary    28 Sep 2019 07:01  -  29 Sep 2019 07:00  --------------------------------------------------------  IN: 990 mL / OUT: 850 mL / NET: 140 mL        PHYSICAL EXAM:  GENERAL: NAD, well-developed  HEAD:  Atraumatic, Normocephalic  EYES: EOMI, PERRLA, conjunctiva and sclera clear  NECK: Supple, No JVD  CHEST/LUNG: Clear to auscultation bilaterally; No wheeze  HEART: Regular rate and rhythm; No murmurs, rubs, or gallops  ABDOMEN: Soft, Nontender, Nondistended; Bowel sounds present  EXTREMITIES:  2+ Peripheral Pulses, No clubbing, cyanosis, or edema  PSYCH: AAOx3  NEUROLOGY: non-focal  SKIN: No rashes or lesions      MEDICATIONS  (STANDING):  atorvastatin 40 milliGRAM(s) Oral at bedtime  docusate sodium 100 milliGRAM(s) Oral three times a day  DULoxetine 30 milliGRAM(s) Oral daily  gabapentin 800 milliGRAM(s) Oral three times a day  influenza   Vaccine 0.5 milliLiter(s) IntraMuscular once  lidocaine   Patch 1 Patch Transdermal daily  pantoprazole Infusion 8 mG/Hr (10 mL/Hr) IV Continuous <Continuous>  prednisoLONE acetate 1% Suspension 1 Drop(s) Both EYES four times a day  valACYclovir 1000 milliGRAM(s) Oral three times a day    MEDICATIONS  (PRN):  acetaminophen   Tablet .. 650 milliGRAM(s) Oral every 6 hours PRN Mild Pain (1 - 3)  artificial  tears Solution 1 Drop(s) Both EYES every 2 hours PRN Dry Eyes  HYDROmorphone  Injectable 1 milliGRAM(s) IV Push every 6 hours PRN Moderate Pain (4 - 6)  oxyCODONE    IR 10 milliGRAM(s) Oral three times a day PRN Severe Pain (7 - 10)  senna 2 Tablet(s) Oral at bedtime PRN Constipation  sodium chloride 2% Ophthalmic Solution 1 Drop(s) Left EYE four times a day PRN eye pain  zolpidem 5 milliGRAM(s) Oral at bedtime PRN Insomnia      LABS:  CAPILLARY BLOOD GLUCOSE                              9.5    10.2  )-----------( 438      ( 29 Sep 2019 07:13 )             28.3     Auto Eosinophil # x     / Auto Eosinophil % x     / Auto Neutrophil # x     / Auto Neutrophil % x     / BANDS % x                            8.6    10.6  )-----------( 406      ( 28 Sep 2019 18:49 )             27.5     Auto Eosinophil # x     / Auto Eosinophil % x     / Auto Neutrophil # x     / Auto Neutrophil % x     / BANDS % x                            8.6    10.2  )-----------( 408      ( 28 Sep 2019 11:12 )             26.8     Auto Eosinophil # x     / Auto Eosinophil % x     / Auto Neutrophil # x     / Auto Neutrophil % x     / BANDS % x        Hgb Trend: 9.5<--, 8.6<--, 8.6<--, 9.0<--, 6.8<--      141  |  104  |  29<H>  ----------------------------<  87  4.4   |  25  |  0.40<L>      138  |  105  |  28<H>  ----------------------------<  109<H>  4.6   |  27  |  0.46<L>      141  |  109<H>  |  22  ----------------------------<  137<H>  4.3   |  23  |  0.54    Ca    8.9      29 Sep 2019 07:13  Mg     2.1       Phos  2.5       TPro  4.7<L>  /  Alb  2.4<L>  /  TBili  0.2  /  DBili  x   /  AST  12  /  ALT  6<L>  /  AlkPhos  66    TPro  5.5<L>  /  Alb  2.3<L>  /  TBili  0.3  /  DBili  x   /  AST  11<L>  /  ALT  10<L>  /  AlkPhos  81      Creatinine Trend: 0.40<--, 0.46<--, 0.54<--, 0.57<--, 0.40<--, 0.43<--  PT/INR - ( 27 Sep 2019 22:54 )   PT: 12.6 sec;   INR: 1.10 ratio         PTT - ( 27 Sep 2019 22:54 )  PTT:30.2 sec      Urinalysis Basic - ( 27 Sep 2019 18:41 )    Color: Yellow / Appearance: very cloudy / S.010 / pH: x  Gluc: x / Ketone: Negative  / Bili: Negative / Urobili: Negative mg/dL   Blood: x / Protein: 30 mg/dL / Nitrite: Positive   Leuk Esterase: Moderate / RBC: 25-50 /HPF / WBC >50   Sq Epi: x / Non Sq Epi: Moderate / Bacteria: Many            ABG:   VBG:     MICROBIOLOGY:       RADIOLOGY & ADDITIONAL TESTS:      Consultant(s) Notes Reviewed: Shy Barakat MD  PGY2 | Dept of Internal Medicine  [facility] [[email or web address]] [phone]        Patient is a 68y old  Female who presents with a chief complaint of anemia presumed 2/2 GIB (28 Sep 2019 11:32)      SUBJECTIVE / OVERNIGHT EVENTS: No melena, hematochezia or hematemesis overnight.  ROS neg as below.         Review of Systems:  General: fatigue [ ], fever/chills [ ], weakness [ ], weight loss [ ]  HEENT: headache [ ], hearing changes [ ], vision changes [ ], hoarseness [ ], sore throat [ ], nasal discharge [ ]  Cardiovascular: chest pain [ ], palpiations [ ], dyspnea on exertion [ ], orthopnea [ ], PND [ ]  Respiratory: SOB [ ], cough [ ], wheeze [ ], exercise intolerance [ ]  Gastrointestinal: abdominal pain [ ], unintentional weight loss [ ], heartburn [ ], nausea [ ], vomiting [ ], hematochezia [ ], melena [ ]  Genitourinary: dysuria [ ], frequency [ ], urgency [ ], hematuria [ ], incontinence [ ]  Skin: lesions [ ], rashes [ ]  Neuro: headache [ ], changes in senses [ ], speech problems [ ], balance problems [ ], numbness/tingling [ ], weakness [ ]          Vital Signs Last 24 Hrs  T(C): 36.9 (29 Sep 2019 04:35), Max: 37.3 (28 Sep 2019 11:30)  T(F): 98.4 (29 Sep 2019 04:35), Max: 99.2 (28 Sep 2019 11:30)  HR: 99 (29 Sep 2019 04:35) (73 - 112)  BP: 170/77 (29 Sep 2019 04:35) (113/74 - 170/77)  BP(mean): --  RR: 18 (29 Sep 2019 04:35) (18 - 18)  SpO2: 96% (29 Sep 2019 04:35) (94% - 97%)    I&O's Summary    28 Sep 2019 07:01  -  29 Sep 2019 07:00  --------------------------------------------------------  IN: 990 mL / OUT: 850 mL / NET: 140 mL        PHYSICAL EXAM:  GENERAL: NAD, well-developed  HEAD:  Atraumatic, Normocephalic  EYES: EOMI, PERRLA, conjunctiva and sclera clear  NECK: Supple, No JVD  CHEST/LUNG: Clear to auscultation bilaterally; No wheeze  HEART: Regular rate and rhythm; No murmurs, rubs, or gallops  ABDOMEN: Soft, Nontender, Nondistended; Bowel sounds present  EXTREMITIES:  2+ Peripheral Pulses, No clubbing, cyanosis, or edema  PSYCH: AAOx3  NEUROLOGY: non-focal  SKIN: No rashes or lesions      MEDICATIONS  (STANDING):  atorvastatin 40 milliGRAM(s) Oral at bedtime  docusate sodium 100 milliGRAM(s) Oral three times a day  DULoxetine 30 milliGRAM(s) Oral daily  gabapentin 800 milliGRAM(s) Oral three times a day  influenza   Vaccine 0.5 milliLiter(s) IntraMuscular once  lidocaine   Patch 1 Patch Transdermal daily  pantoprazole Infusion 8 mG/Hr (10 mL/Hr) IV Continuous <Continuous>  prednisoLONE acetate 1% Suspension 1 Drop(s) Both EYES four times a day  valACYclovir 1000 milliGRAM(s) Oral three times a day    MEDICATIONS  (PRN):  acetaminophen   Tablet .. 650 milliGRAM(s) Oral every 6 hours PRN Mild Pain (1 - 3)  artificial  tears Solution 1 Drop(s) Both EYES every 2 hours PRN Dry Eyes  HYDROmorphone  Injectable 1 milliGRAM(s) IV Push every 6 hours PRN Moderate Pain (4 - 6)  oxyCODONE    IR 10 milliGRAM(s) Oral three times a day PRN Severe Pain (7 - 10)  senna 2 Tablet(s) Oral at bedtime PRN Constipation  sodium chloride 2% Ophthalmic Solution 1 Drop(s) Left EYE four times a day PRN eye pain  zolpidem 5 milliGRAM(s) Oral at bedtime PRN Insomnia      LABS:  CAPILLARY BLOOD GLUCOSE                              9.5    10.2  )-----------( 438      ( 29 Sep 2019 07:13 )             28.3     Auto Eosinophil # x     / Auto Eosinophil % x     / Auto Neutrophil # x     / Auto Neutrophil % x     / BANDS % x                            8.6    10.6  )-----------( 406      ( 28 Sep 2019 18:49 )             27.5     Auto Eosinophil # x     / Auto Eosinophil % x     / Auto Neutrophil # x     / Auto Neutrophil % x     / BANDS % x                            8.6    10.2  )-----------( 408      ( 28 Sep 2019 11:12 )             26.8     Auto Eosinophil # x     / Auto Eosinophil % x     / Auto Neutrophil # x     / Auto Neutrophil % x     / BANDS % x        Hgb Trend: 9.5<--, 8.6<--, 8.6<--, 9.0<--, 6.8<--      141  |  104  |  29<H>  ----------------------------<  87  4.4   |  25  |  0.40<L>      138  |  105  |  28<H>  ----------------------------<  109<H>  4.6   |  27  |  0.46<L>      141  |  109<H>  |  22  ----------------------------<  137<H>  4.3   |  23  |  0.54    Ca    8.9      29 Sep 2019 07:13  Mg     2.1       Phos  2.5       TPro  4.7<L>  /  Alb  2.4<L>  /  TBili  0.2  /  DBili  x   /  AST  12  /  ALT  6<L>  /  AlkPhos  66    TPro  5.5<L>  /  Alb  2.3<L>  /  TBili  0.3  /  DBili  x   /  AST  11<L>  /  ALT  10<L>  /  AlkPhos  81      Creatinine Trend: 0.40<--, 0.46<--, 0.54<--, 0.57<--, 0.40<--, 0.43<--  PT/INR - ( 27 Sep 2019 22:54 )   PT: 12.6 sec;   INR: 1.10 ratio         PTT - ( 27 Sep 2019 22:54 )  PTT:30.2 sec      Urinalysis Basic - ( 27 Sep 2019 18:41 )    Color: Yellow / Appearance: very cloudy / S.010 / pH: x  Gluc: x / Ketone: Negative  / Bili: Negative / Urobili: Negative mg/dL   Blood: x / Protein: 30 mg/dL / Nitrite: Positive   Leuk Esterase: Moderate / RBC: 25-50 /HPF / WBC >50   Sq Epi: x / Non Sq Epi: Moderate / Bacteria: Many            ABG:   VBG:     MICROBIOLOGY:       RADIOLOGY & ADDITIONAL TESTS:      Consultant(s) Notes Reviewed:

## 2019-09-29 NOTE — PROGRESS NOTE ADULT - PROBLEM SELECTOR PLAN 1
Multiple recent episodes of upper GI bleeding without clear source of bleed on numerous recent EGDs, VCE, CTA  - currently HD stable and asymptomatic  - s/p 2U PRBC with appropriate response  - c/w protonix gtt  - monitor CBC q8, maintain active T&S, transfuse for Hb <7  - outpt GI is  Dr. Ambrocio  --covered by Dr. Webster, GI attending ([phone]), if active bleeding, STAT CTA abd/pelvis  -multiple prior CTA nondiagnostic, given no active bleeding currently, will hold off for now.

## 2019-09-29 NOTE — PROGRESS NOTE ADULT - SUBJECTIVE AND OBJECTIVE BOX
patient having regular green/brown BMs and H/H stable  Feels well and requests to be discharged  Will advance diet      Vital Signs Last 24 Hrs  T(C): 36.8 (29 Sep 2019 11:38), Max: 37 (29 Sep 2019 00:05)  T(F): 98.3 (29 Sep 2019 11:38), Max: 98.6 (29 Sep 2019 00:05)  HR: 104 (29 Sep 2019 11:38) (73 - 112)  BP: 153/84 (29 Sep 2019 11:38) (113/74 - 170/77)  BP(mean): --  RR: 18 (29 Sep 2019 11:38) (18 - 18)  SpO2: 97% (29 Sep 2019 11:38) (94% - 97%)    PHYSICAL EXAM:      Constitutional: NAD, ill appearing middle aged woamn  Neck: No LAD, supple  Respiratory: CTA and P  Cardiovascular: S1 and S2, RRR, no M  Gastrointestinal: BS+, soft, NT/ND, neg HSM,  Neurological: A/O x 3  Psychiatric: Normal mood, normal affect  Skin: No rashes    MEDICATIONS  (STANDING):  atorvastatin 40 milliGRAM(s) Oral at bedtime  docusate sodium 100 milliGRAM(s) Oral three times a day  DULoxetine 30 milliGRAM(s) Oral daily  gabapentin 800 milliGRAM(s) Oral three times a day  influenza   Vaccine 0.5 milliLiter(s) IntraMuscular once  lidocaine   Patch 1 Patch Transdermal daily  pantoprazole Infusion 8 mG/Hr (10 mL/Hr) IV Continuous <Continuous>  prednisoLONE acetate 1% Suspension 1 Drop(s) Both EYES four times a day  valACYclovir 1000 milliGRAM(s) Oral three times a day    MEDICATIONS  (PRN):  acetaminophen   Tablet .. 650 milliGRAM(s) Oral every 6 hours PRN Mild Pain (1 - 3)  artificial  tears Solution 1 Drop(s) Both EYES every 2 hours PRN Dry Eyes  HYDROmorphone  Injectable 1 milliGRAM(s) IV Push every 6 hours PRN Moderate Pain (4 - 6)  oxyCODONE    IR 10 milliGRAM(s) Oral three times a day PRN Severe Pain (7 - 10)  senna 2 Tablet(s) Oral at bedtime PRN Constipation  sodium chloride 2% Ophthalmic Solution 1 Drop(s) Left EYE four times a day PRN eye pain  zolpidem 5 milliGRAM(s) Oral at bedtime PRN Insomnia      Allergies    No Known Allergies    Intolerances          LABS:                        9.5    10.2  )-----------( 438      ( 29 Sep 2019 07:13 )             28.3                         8.6    10.6  )-----------( 406      ( 28 Sep 2019 18:49 )             27.5                         8.6    10.2  )-----------( 408      ( 28 Sep 2019 11:12 )             26.8     09    141  |  104  |  29<H>  ----------------------------<  87  4.4   |  25  |  0.40<L>    Ca    8.9      29 Sep 2019 07:13  Phos  2.5       Mg     2.1         TPro  4.7<L>  /  Alb  2.4<L>  /  TBili  0.2  /  DBili  x   /  AST  12  /  ALT  6<L>  /  AlkPhos  66      PT/INR - ( 27 Sep 2019 22:54 )   PT: 12.6 sec;   INR: 1.10 ratio         PTT - ( 27 Sep 2019 22:54 )  PTT:30.2 sec  Urinalysis Basic - ( 27 Sep 2019 18:41 )    Color: Yellow / Appearance: very cloudy / S.010 / pH: x  Gluc: x / Ketone: Negative  / Bili: Negative / Urobili: Negative mg/dL   Blood: x / Protein: 30 mg/dL / Nitrite: Positive   Leuk Esterase: Moderate / RBC: 25-50 /HPF / WBC >50   Sq Epi: x / Non Sq Epi: Moderate / Bacteria: Many      LIVER FUNCTIONS - ( 27 Sep 2019 22:54 )  Alb: 2.4 g/dL / Pro: 4.7 g/dL / ALK PHOS: 66 U/L / ALT: 6 U/L / AST: 12 U/L / GGT: x         LIVER FUNCTIONS - ( 27 Sep 2019 18:41 )  Alb: 2.3 g/dL / Pro: 5.5 gm/dL / ALK PHOS: 81 U/L / ALT: 10 U/L / AST: 11 U/L / GGT: x               RADIOLOGY & ADDITIONAL TESTS:

## 2019-09-29 NOTE — PROGRESS NOTE ADULT - ASSESSMENT
69 yo F w/ multiple episode of upper GI bleeding over the past 6-8 week with presence of blood in stomach however no etiology for bleeding found on > 6 endoscopies    At this time patient is NOT bleeding actively and has greenish brown stool. She has had a stable H/H this AM  -OK to convert to PPI 40 IV BID  -Monitor H/H once daily   -Advance to regular diet  - Urgent CT Angiogram in the event of brisk bleeding to localize source of bleeding - YAQUELIN patient's RN and primary team.  - Surgical evaluation appreciated  - If no actively bleeding, anticipate discharge tomorrow    - Will follow closely

## 2019-09-29 NOTE — PROGRESS NOTE ADULT - PROBLEM SELECTOR PLAN 2
Presented with hypothermia, tachycardia, hypotension with positive UA in setting of self-catheterizing at home from neurogenic bladder.  Recent UTI with pseudomonas and ESBL k pneumoniae sensitive to drew  - c/w drew 1g q8  - f/u UCx 9/28

## 2019-09-30 NOTE — PROGRESS NOTE ADULT - SUBJECTIVE AND OBJECTIVE BOX
COVERING RESIDENT: Ashley Webster (PGY-1) | NS PAGER [phone] (NS) | [facility] PAGER [phone]    BRENT MONSALVE  MRN-[id number]    INTERVAL HPI/OVERNIGHT EVENTS: no acute events overnight, stable Hgb this Am, d/c planning    SUBJECTIVE: Patient seen and examined at bedside.     CONSTITUTIONAL: No weakness, fevers or chills  EYES/ENT: No visual changes;  No vertigo or throat pain   NECK: No pain or stiffness  RESPIRATORY: No cough, wheezing, hemoptysis; No shortness of breath  CARDIOVASCULAR: No chest pain or palpitations  GASTROINTESTINAL: No abdominal or epigastric pain. No nausea, vomiting, or hematemesis; No diarrhea or constipation. No melena or hematochezia.  GENITOURINARY: No dysuria, frequency or hematuria  NEUROLOGICAL: No numbness or weakness  SKIN: No itching, rashes    OBJECTIVE:    VITAL SIGNS:  ICU Vital Signs Last 24 Hrs  T(C): 36.7 (30 Sep 2019 08:23), Max: 37.1 (29 Sep 2019 20:03)  T(F): 98 (30 Sep 2019 08:23), Max: 98.7 (29 Sep 2019 20:03)  HR: 109 (30 Sep 2019 08:23) (104 - 115)  BP: 130/66 (30 Sep 2019 08:23) (129/74 - 153/84)  BP(mean): --  ABP: --  ABP(mean): --  RR: 18 (30 Sep 2019 08:23) (18 - 18)  SpO2: 97% (30 Sep 2019 08:23) (93% - 98%)        09-29 @ 07:01  -  09-30 @ 07:00  --------------------------------------------------------  IN: 1160 mL / OUT: 1400 mL / NET: -240 mL      CAPILLARY BLOOD GLUCOSE            PHYSICAL EXAM:    GENERAL: no acute distress  HEENT: NC/AT, EOMI, neck supple, MMM  RESPIRATORY: LCTAB/L, no rhonchi, rales, or wheezing  CARDIOVASCULAR: RRR, no murmurs, gallops, rubs  ABDOMINAL: soft, non-tender, non-distended, positive bowel sounds   EXTREMITIES: no clubbing, cyanosis, or edema  NEUROLOGICAL: alert and oriented x 3, non-focal  SKIN: no rashes or lesions   MUSCULOSKELETAL: no gross joint deformity    MEDICATIONS:  MEDICATIONS  (STANDING):  atorvastatin 40 milliGRAM(s) Oral at bedtime  docusate sodium 100 milliGRAM(s) Oral three times a day  DULoxetine 30 milliGRAM(s) Oral daily  gabapentin 800 milliGRAM(s) Oral three times a day  influenza   Vaccine 0.5 milliLiter(s) IntraMuscular once  lidocaine   Patch 1 Patch Transdermal daily  pantoprazole  Injectable 40 milliGRAM(s) IV Push every 12 hours  prednisoLONE acetate 1% Suspension 1 Drop(s) Both EYES four times a day  valACYclovir 1000 milliGRAM(s) Oral three times a day    MEDICATIONS  (PRN):  acetaminophen   Tablet .. 650 milliGRAM(s) Oral every 6 hours PRN Mild Pain (1 - 3)  artificial  tears Solution 1 Drop(s) Both EYES every 2 hours PRN Dry Eyes  HYDROmorphone  Injectable 1 milliGRAM(s) IV Push every 6 hours PRN Moderate Pain (4 - 6)  oxyCODONE    IR 10 milliGRAM(s) Oral three times a day PRN Severe Pain (7 - 10)  senna 2 Tablet(s) Oral at bedtime PRN Constipation  sodium chloride 2% Ophthalmic Solution 1 Drop(s) Left EYE four times a day PRN eye pain  zolpidem 5 milliGRAM(s) Oral at bedtime PRN Insomnia      ALLERGIES:  Allergies    No Known Allergies    Intolerances        LABS:                        8.6    6.60  )-----------( 363      ( 30 Sep 2019 08:16 )             27.7     09-30    140  |  106  |  19  ----------------------------<  92  4.1   |  26  |  0.44<L>    Ca    8.7      30 Sep 2019 06:22  Phos  2.9     09-30  Mg     2.1     09-30              RADIOLOGY & ADDITIONAL TESTS: Reviewed. COVERING RESIDENT: Ashley Webster (PGY-1) | NS PAGER [phone] (NS) | [facility] PAGER [phone]    BRENT MONSALVE  MRN-[id number]    INTERVAL HPI/OVERNIGHT EVENTS: no acute events overnight, stable Hgb this Am, d/c planning    SUBJECTIVE: Patient seen and examined at bedside. No complaints overnight except increased frequency of stools. No fever, chills, SOB, CP, nausea, vomiting.    OBJECTIVE:    VITAL SIGNS:  ICU Vital Signs Last 24 Hrs  T(C): 36.7 (30 Sep 2019 08:23), Max: 37.1 (29 Sep 2019 20:03)  T(F): 98 (30 Sep 2019 08:23), Max: 98.7 (29 Sep 2019 20:03)  HR: 109 (30 Sep 2019 08:23) (104 - 115)  BP: 130/66 (30 Sep 2019 08:23) (129/74 - 153/84)  BP(mean): --  ABP: --  ABP(mean): --  RR: 18 (30 Sep 2019 08:23) (18 - 18)  SpO2: 97% (30 Sep 2019 08:23) (93% - 98%)        09-29 @ 07:01  -  09-30 @ 07:00  --------------------------------------------------------  IN: 1160 mL / OUT: 1400 mL / NET: -240 mL      CAPILLARY BLOOD GLUCOSE            PHYSICAL EXAM:    GENERAL: no acute distress  HEENT: NC/AT, EOMI, neck supple, MMM  RESPIRATORY: LCTAB/L, no rhonchi, rales, or wheezing  CARDIOVASCULAR: RRR, no murmurs, gallops, rubs  ABDOMINAL: soft, non-tender, non-distended, positive bowel sounds   EXTREMITIES: no clubbing, cyanosis, or edema  NEUROLOGICAL: alert and oriented x 3, non-focal  SKIN: no rashes or lesions   MUSCULOSKELETAL: no gross joint deformity    MEDICATIONS:  MEDICATIONS  (STANDING):  atorvastatin 40 milliGRAM(s) Oral at bedtime  docusate sodium 100 milliGRAM(s) Oral three times a day  DULoxetine 30 milliGRAM(s) Oral daily  gabapentin 800 milliGRAM(s) Oral three times a day  influenza   Vaccine 0.5 milliLiter(s) IntraMuscular once  lidocaine   Patch 1 Patch Transdermal daily  pantoprazole  Injectable 40 milliGRAM(s) IV Push every 12 hours  prednisoLONE acetate 1% Suspension 1 Drop(s) Both EYES four times a day  valACYclovir 1000 milliGRAM(s) Oral three times a day    MEDICATIONS  (PRN):  acetaminophen   Tablet .. 650 milliGRAM(s) Oral every 6 hours PRN Mild Pain (1 - 3)  artificial  tears Solution 1 Drop(s) Both EYES every 2 hours PRN Dry Eyes  HYDROmorphone  Injectable 1 milliGRAM(s) IV Push every 6 hours PRN Moderate Pain (4 - 6)  oxyCODONE    IR 10 milliGRAM(s) Oral three times a day PRN Severe Pain (7 - 10)  senna 2 Tablet(s) Oral at bedtime PRN Constipation  sodium chloride 2% Ophthalmic Solution 1 Drop(s) Left EYE four times a day PRN eye pain  zolpidem 5 milliGRAM(s) Oral at bedtime PRN Insomnia      ALLERGIES:  Allergies    No Known Allergies    Intolerances        LABS:                        8.6    6.60  )-----------( 363      ( 30 Sep 2019 08:16 )             27.7     09-30    140  |  106  |  19  ----------------------------<  92  4.1   |  26  |  0.44<L>    Ca    8.7      30 Sep 2019 06:22  Phos  2.9     09-30  Mg     2.1     09-30              RADIOLOGY & ADDITIONAL TESTS: Reviewed.

## 2019-09-30 NOTE — DIETITIAN INITIAL EVALUATION ADULT. - REASON INDICATOR FOR ASSESSMENT
Nutrition consult received for pressure injury stage II or >  Source: EMR, pt, previous RD notes  Per chart, 67 y/o female discharged from [facility] 9/24, now re-admitted for anemia. Pt was transferred from [facility] for endoscopic evaluation after multiple EGDs without findings of bleeding source  PMHx: HTN, aortic dissection s/p repair, spinal cord hematoma c/b functional paraplegia, chronic spasticity and pain on baclofen pump, history of recurrent acute blood loss anemia

## 2019-09-30 NOTE — PROGRESS NOTE ADULT - PROBLEM SELECTOR PLAN 4
Hold home labetalol in setting of recent hypotension and GIB  - monitor VS q4

## 2019-09-30 NOTE — PROGRESS NOTE ADULT - ATTENDING COMMENTS
Patient seen and examined.   Anemia presumed to be GIB- CT Angiogram in the event of brisk bleeding to localize source of bleeding  s/p PRBC, PPI iv drip.   GI, surgery consults appreciated.   Monitor CBC.   D/C planning am tomorrow if CBC stable.
Patient seen and examined in AM with the resident and team.   she stated that she had few BMs but not watery or bloody. no abdominal pain. afebrile HD stable except HR   abdominal exam is benign and Hgb is stable   GI note appreciated and OK with d/c   will recheck CBC and if stable then D/c planning with home PT  patient UA showed pyuria and bacteriuria but culture showed likely contamination she is afebrile and WBC is ok
Anemia presumed to be GIB- CT Angiogram in the event of brisk bleeding to localize source of bleeding  s/p PRBC, PPI iv drip.   GI, surgery consults appreciated.   Monitor CBC.  Chronic Pain management- Baclofen pump. Continue with home meds.

## 2019-09-30 NOTE — PROGRESS NOTE ADULT - SUBJECTIVE AND OBJECTIVE BOX
INTERVAL HPI/OVERNIGHT EVENTS: FOR DR ROLDAN  Pt without new complaints     MEDICATIONS  (STANDING):  atorvastatin 40 milliGRAM(s) Oral at bedtime  docusate sodium 100 milliGRAM(s) Oral three times a day  DULoxetine 30 milliGRAM(s) Oral daily  gabapentin 800 milliGRAM(s) Oral three times a day  influenza   Vaccine 0.5 milliLiter(s) IntraMuscular once  lidocaine   Patch 1 Patch Transdermal daily  loperamide 2 milliGRAM(s) Oral every 4 hours  pantoprazole  Injectable 40 milliGRAM(s) IV Push every 12 hours  prednisoLONE acetate 1% Suspension 1 Drop(s) Both EYES four times a day  valACYclovir 1000 milliGRAM(s) Oral three times a day    MEDICATIONS  (PRN):  acetaminophen   Tablet .. 650 milliGRAM(s) Oral every 6 hours PRN Mild Pain (1 - 3)  artificial  tears Solution 1 Drop(s) Both EYES every 2 hours PRN Dry Eyes  HYDROmorphone  Injectable 1 milliGRAM(s) IV Push every 6 hours PRN Moderate Pain (4 - 6)  oxyCODONE    IR 10 milliGRAM(s) Oral three times a day PRN Severe Pain (7 - 10)  senna 2 Tablet(s) Oral at bedtime PRN Constipation  sodium chloride 2% Ophthalmic Solution 1 Drop(s) Left EYE four times a day PRN eye pain  zolpidem 5 milliGRAM(s) Oral at bedtime PRN Insomnia      Allergies    No Known Allergies    Intolerances        Review of Systems: see HPI      Vital Signs Last 24 Hrs  T(C): 36.7 (30 Sep 2019 12:29), Max: 37.1 (29 Sep 2019 20:03)  T(F): 98 (30 Sep 2019 12:29), Max: 98.7 (29 Sep 2019 20:03)  HR: 93 (30 Sep 2019 12:29) (93 - 115)  BP: 153/87 (30 Sep 2019 12:29) (129/74 - 153/87)  BP(mean): --  RR: 18 (30 Sep 2019 12:29) (18 - 18)  SpO2: 96% (30 Sep 2019 12:29) (93% - 98%)    PHYSICAL EXAM:    Constitutional: NAD, well-developed  Gastrointestinal: BS+, soft, NT/ND, neg HSM,  Psychiatric: Normal mood, normal affect      LABS:                        8.6    6.60  )-----------( 363      ( 30 Sep 2019 08:16 )             27.7     09-30    140  |  106  |  19  ----------------------------<  92  4.1   |  26  |  0.44<L>    Ca    8.7      30 Sep 2019 06:22  Phos  2.9     09-30  Mg     2.1     09-30          LIVER FUNCTIONS - ( 27 Sep 2019 22:54 )  Alb: 2.4 g/dL / Pro: 4.7 g/dL / ALK PHOS: 66 U/L / ALT: 6 U/L / AST: 12 U/L / GGT: x             RADIOLOGY & ADDITIONAL TESTS:

## 2019-09-30 NOTE — DISCHARGE NOTE PROVIDER - NSDCCPCAREPLAN_GEN_ALL_CORE_FT
PRINCIPAL DISCHARGE DIAGNOSIS  Diagnosis: GI bleed  Assessment and Plan of Treatment: You came to the hospital because your hemoglobin level was found to be very low at home. Because you have a history of GI bleeding, you were sent to the hospital to receive treatment for the bleeding. You were given 2 units of blood with an appropriate response. Because you did not have any symptoms of active GI bleeding, and were seen by Dr. Ambrocio's office, you can follow up with his office as an outpatient. It is important to schecule and appointment with his office within 1-2 weeks.      SECONDARY DISCHARGE DIAGNOSES  Diagnosis: Sepsis due to urinary tract infection  Assessment and Plan of Treatment: When you came to the hospital there was a concern that you might have a urinary tract infection (UTI), and there is a history of UTIs. You were given 1 dose of an antibiotic called meropenem and your urine was culture to look for any bacteria, and none were found. If you have symptoms of a UTI such as pain with urinartion, increased frequency, pain above your pelvic bone/bladder, or fever, you should come back to the hospital. You should follow up with your primary care doctor within 1 - 2 weeks.

## 2019-09-30 NOTE — DIETITIAN INITIAL EVALUATION ADULT. - PHYSICAL APPEARANCE
other (specify) Nutrition-focused physical exam conducted with consent from pt. Findings: Moderate muscle wasting of temples, clavicles; mild muscle wasting of acromion process; mild fat wasting of buccal region; no fat loss detected of triceps. Pt states she is unsure of if she looks thinner because she has not been able to see herself in a mirror during admission.

## 2019-09-30 NOTE — CHART NOTE - NSCHARTNOTEFT_GEN_A_CORE
Upon Nutritional Assessment by the Registered Dietitian your patient was determined to meet criteria / has evidence of the following diagnosis/diagnoses:          [ ]  Mild Protein Calorie Malnutrition        [ ]  Moderate Protein Calorie Malnutrition        [x] Severe Protein Calorie Malnutrition        [ ] Unspecified Protein Calorie Malnutrition        [ ] Underweight / BMI <19        [ ] Morbid Obesity / BMI > 40      Findings as based on:  [ ] Comprehensive nutrition assessment   [ ] Nutrition-focused physical exam conducted with consent from pt. Findings: Moderate muscle wasting of temples, clavicles; mild muscle wasting of acromion process; mild fat wasting of buccal region; no fat loss detected of triceps. Pt states she is unsure of if she looks thinner because she has not been able to see herself in a mirror during admission.  [ ] Other:   Etiology: inadequate protein-energy intake in setting of altered GI function, extended hospitalization.   Signs/Symptoms: <75% estimated energy requirement >1 month, moderate muscle wasting/fat loss.     Nutrition Plan/Recommendations:    1) Liberalize diet to regular to increase PO intake, prevent further wt loss.   2) Magic cup added by RD   3) Add Multivitamin and vitamin C for wound healing   4) Pt aware RD remains available for any concerns, questions or diet preferences    Lisa Torres RD. Pager: [phone]     PROVIDER Section:     By signing this assessment you are acknowledging and agree with the diagnosis/diagnoses assigned by the Registered Dietitian    Comments:

## 2019-09-30 NOTE — PROGRESS NOTE ADULT - ASSESSMENT
68y F w/ PMH HTN, aortic dissection s/p repair, spinal cord hematoma c/b functional paraplegia, chronic spasticity and pain on baclofen pump, history of recurrent acute blood loss anemia a/w UGIB transferred from [facility] for endoscopic evaluation. Dr. Ambrocio's office (private GI) updated. Stable Hgb this AM, normal stools, d/c planning 68y F w/ PMH HTN, aortic dissection s/p repair, spinal cord hematoma c/b functional paraplegia, chronic spasticity and pain on baclofen pump, history of recurrent acute blood loss anemia a/w UGIB transferred from [facility] for endoscopic evaluation. Dr. Ambrocio's office (private GI) updated. Stable Hgb this AM, normal stools but increased frequency, d/c planning

## 2019-09-30 NOTE — PROGRESS NOTE ADULT - REASON FOR ADMISSION
anemia presumed 2/2 GIB

## 2019-09-30 NOTE — PROGRESS NOTE ADULT - PROBLEM SELECTOR PROBLEM 2
Sepsis due to urinary tract infection

## 2019-09-30 NOTE — PROGRESS NOTE ADULT - PROBLEM SELECTOR PLAN 1
Multiple recent episodes of upper GI bleeding without clear source of bleed on numerous recent EGDs, VCE, CTA  - currently HD stable and asymptomatic  - s/p 2U PRBC with appropriate response  - c/w protonix gtt  - monitor CBC q8, maintain active T&S, transfuse for Hb <7  - outpt GI is  Dr. Ambrocio  --covered by Dr. Webster, GI attending ([phone]), if active bleeding, STAT CTA abd/pelvis  -multiple prior CTA nondiagnostic, given no active bleeding currently, will hold off for now.

## 2019-09-30 NOTE — PROGRESS NOTE ADULT - PROBLEM SELECTOR PLAN 6
DVT ppx: SCDs; hold pharmacologic ppx in setting of GIB  NPO except meds

## 2019-09-30 NOTE — DISCHARGE NOTE PROVIDER - HOSPITAL COURSE
68y F w/ PMH HTN, aortic dissection (s/p repair several years prior), spinal cord hematoma (now functionally paraplegic, she can move her left leg), chronic spasticity and pain (on Baclofen pump and PRN oxycodone), nephrolithiasis s/p L ureteral stent, endothelitis/keratitis s/p L corneal transplant, history of recurrent UGIB transferred from [facility] for evaluation of recurrent anemia. Pt presented to OSH after home bloodwork by visiting nurse to monitor CBC revealed a Hb of 7 from 8.6 three days prior. Denies any hematemesis, hematochezia, or melena seen by patient, , or HHA since patient discharged 3 days ago. At OSH, Hb 6.9, FOBT+. Given 1U PRBC and started on protonix gtt. En route became tachycardic to 130s and hypotensive 80s/60s. Patient was given IVF, Zofran for nausea. Previously endorsed lightheadedness. Currently denies any symptoms acute symptoms including fevers, chills, headaches, dizziness, cp, palpitations, cough, sob, hematemesis, abd pain, n/v/c/d, BRBPR, melena.        Pt was admitted earlier this month after being transferred from [place] for GIB of unclear source despite 4 EGDs and a CT angiogram. Patient required 10U PRBC at that admission in [place]. At [facility], EGD on 9/5 showed acute gastritis, gastric polyps with no source of GIB identified. Enteroscopy 9/9 showed normal jejunum. Capsule endoscopy 9/11 showed large amount of blood that appeared to be coming from the distal aspect of the stomach. Repeat EGD on 9/13 after a bloody BM showed no evidence of active bleeding. On 9/17 patient was tachycardic to 140s, NGT aspiration showed 2-3cc of bright red blood. Repeat EGD 9/17 did not show active bleeding sites. CTA A/P 9/18 negative for source of bleeding.  Surgery recommended ex-lap which was deferred. Patient was transfused 8U of PRBC for recurrent acute blood loss anemia.        In ED: T95.2 -> now 97.8,  -> now 82, BP 80/44 -> now 113/72, RR 20 -> now 16, SpO2 94% on RA now 100% on 2L. Given 1U PRBC at [place], an additional 1U PRBC here, meropenem 500mg, and oxycodone 10mg.             Pt received 2U PRBC with appropriate response, was placed on protonix gtt, seen by Dr. Abby Webster (Dr. Donell Ambrocio's office) over the weekend -         Presented with hypothermia, tachycardia, hypotension with positive UA in setting of self-catheterizing at home from neurogenic bladder. Recent UTI with pseudomonas and ESBL k pneumoniae sensitive to drew. Given 1 g meropenem and followed urine culture from 9/28 - remained NGTD.        Pt remained afebrile with no s/sx of active infection and was hemodynamically stable with no s/sx of active bleeding and was discharged with close outpatient follow-up.

## 2019-09-30 NOTE — DIETITIAN INITIAL EVALUATION ADULT. - OTHER INFO
Intake PTA: Pt has been hospitalized for the past 1.5 months with frequent inadequate diets due to GIB and multiple EGDs. Per previous RD note, pt does not follow specific diet at home and had a good appetite PTA in August.    Confirms NKFA, Vitamin B, Vitamin C, and Vitamin D supplementation PTA, no nausea/vomiting, no abdominal pain, no difficulty chewing/swallowing. Pt endorses multiple BMs for past few days. Pt is ordered for imodium. RD discussed eating more low fiber foods such as refined carbohydrates and banana to bulk stool, and limiting high-fiber food intake such as bran, raw fruits/vegetables. Pt would like fausto crackers    Diet: Pt states she has been eating adequately while on DASH diet- had raisin bran cereal for breakfast. Flow sheets indicate pt has 25 -100% of meals and also enjoys food from home. Pt dislikes health shakes, amenable to trying magic cup for additional protein.    Education: Discussed importance of adequate protein intake to facilitate skin healing, reviewed protein-containing foods and optimizing protein intake at each meal.    Weight Hx: Pt states UBW is 110 pounds. Dosing wt is 110 pounds (9/27) and pt is with 2+ edema. Per [facility] records and previous RD note: (9/18) 121 pounds with edema, (9/5) 111 pounds, (9/2) 119 pounds with edema, (8/13) 116 pounds with edema.

## 2019-09-30 NOTE — DISCHARGE NOTE NURSING/CASE MANAGEMENT/SOCIAL WORK - PATIENT PORTAL LINK FT
You can access the FollowMyHealth Patient Portal offered by [facility] by registering at the following website: [email or web address]. By joining [facility]’s FollowMyHealth portal, you will also be able to view your health information using other applications (apps) compatible with our system.

## 2019-09-30 NOTE — PROGRESS NOTE ADULT - PROBLEM SELECTOR PLAN 2
Presented with hypothermia, tachycardia, hypotension with positive UA in setting of self-catheterizing at home from neurogenic bladder.  Recent UTI with pseudomonas and ESBL k pneumoniae sensitive to drew  - c/w drew 1g q8  - f/u UCx 9/28 Presented with hypothermia, tachycardia, hypotension with positive UA in setting of self-catheterizing at home from neurogenic bladder.  Recent UTI with pseudomonas and ESBL k pneumoniae sensitive to drew  - s/p drew 1g   - f/u UCx 9/28

## 2019-09-30 NOTE — PROGRESS NOTE ADULT - PROBLEM SELECTOR PLAN 5
endothelitis/keratitis c/b L eye blindness s/p L corneal transplant  - c/w valtrex  - c/w prednisolone 1%  - divina and artificial tears PRN

## 2019-09-30 NOTE — DIETITIAN INITIAL EVALUATION ADULT. - PROBLEM SELECTOR PLAN 1
Multiple recent episodes of upper GI bleeding without clear source of bleed on numerous recent EGDs, VCE, CTA  - currently HD stable and asymptomatic  - s/p 2U PRBC with appropriate response  - c/w protonix gtt  - monitor CBC q8, maintain active T&S, transfuse for Hb <7  - call outpt GI Dr. Ambrocio in AM as pt is currently hemodynamically stable  -multiple prior CTA nondiagnostic, given no active bleeding currently, will hold off for now.

## 2019-09-30 NOTE — DISCHARGE NOTE PROVIDER - NSDCHHNEEDSERVICE_GEN_ALL_CORE
Rehabilitation services/Teaching and training Rehabilitation services/Wound care and assessment/Teaching and training

## 2019-09-30 NOTE — DIETITIAN INITIAL EVALUATION ADULT. - ADD RECOMMEND
1) Liberalize diet to regular to increase PO intake, prevent further wt loss. 2) Magic cup added by RD 3) Pt aware RD remains available for any concerns, questions or diet preferences 4) Monitor frequency of stools, PO intake 5) Malnutrition alert placed 1) Liberalize diet to regular to increase PO intake, prevent further wt loss. 2) Magic cup added by RD 3) Add Multivitamin and vitamin C for wound healing 4) Pt aware RD remains available for any concerns, questions or diet preferences 5) Monitor frequency of stools, PO intake 6) Malnutrition alert placed

## 2019-10-01 PROBLEM — I62.00 NONTRAUMATIC SUBDURAL HEMORRHAGE, UNSPECIFIED: Chronic | Status: ACTIVE | Noted: 2019-01-01

## 2019-10-01 NOTE — H&P ADULT - NSHPPHYSICALEXAM_GEN_ALL_CORE
Vital Signs Last 24 Hrs  T(C): 36.8 (01 Oct 2019 16:14), Max: 36.8 (01 Oct 2019 09:35)  T(F): 98.3 (01 Oct 2019 16:14), Max: 98.3 (01 Oct 2019 16:14)  HR: 108 (01 Oct 2019 16:14) (108 - 123)  BP: 148/75 (01 Oct 2019 16:14) (116/75 - 148/75)  BP(mean): --  RR: 14 (01 Oct 2019 16:14) (14 - 19)  SpO2: 100% (01 Oct 2019 16:14) (91% - 100%)

## 2019-10-01 NOTE — ED ADULT NURSE NOTE - NSIMPLEMENTINTERV_GEN_ALL_ED
Implemented All Fall with Harm Risk Interventions:  [place] to call system. Call bell, personal items and telephone within reach. Instruct patient to call for assistance. Room bathroom lighting operational. Non-slip footwear when patient is off stretcher. Physically safe environment: no spills, clutter or unnecessary equipment. Stretcher in lowest position, wheels locked, appropriate side rails in place. Provide visual cue, wrist band, yellow gown, etc. Monitor gait and stability. Monitor for mental status changes and reorient to person, place, and time. Review medications for side effects contributing to fall risk. Reinforce activity limits and safety measures with patient and family. Provide visual clues: red socks.

## 2019-10-01 NOTE — ED ADULT NURSE REASSESSMENT NOTE - NS ED NURSE REASSESS COMMENT FT1
Patient resting comfortably in bed, turned and positioned 2qh. Patient moved onto air mattress earlier during my shift. VSS, denies pain/discomfort at this time. Stage II to coccyx, allevyn dressing in place. No s/s of distress on my time. Hand-off report to JIMBO Gavin, transport to 5E. Safety & comfort measures in place, purposeful active rounding on my time.

## 2019-10-01 NOTE — ED ADULT NURSE NOTE - OBJECTIVE STATEMENT
pt comes to the  ED with weakness. Pt has a bleed somewhere but they don't know where. Pt states her  looked at her and said she didn't look right and they called her cardiologist who said to come to ED. Pt reports having recent blood transfusion in august from possible GI bleed. No vomiting, fever, sweats, chills. Pt hasn't noticed blood in her stool but she is unsure. Pt states she feels very thirsty. Pt is Paraplegic per

## 2019-10-01 NOTE — CONSULT NOTE ADULT - ASSESSMENT
H/O Recurrent Upper GIbleed-Pt recently d/cd from [facility] EGD : No obvious source  Pt readmitted for Weakness ,Anemia Positive FOBT  REC  Clears  NPO past midnight  EGD with Push enteroscopy > Colonoscopy  Continue PPI

## 2019-10-01 NOTE — H&P ADULT - HISTORY OF PRESENT ILLNESS
Pt is poor historian and  provided hx to ED.  Reviewed EMR    68 yr old female w hxf aortic dissection (post-repair several years prior), spinal cord hematoma (now functionally paraplegic), chronic spasticity+pain (on Oxycodone and Baclofen pump), HTN, nephrolithiasis s/p left urethral stent, UTIs and endotheliitis/keratitis (post-corneal transplant), with recent hospitalization at [facility] w discharge yesterday returned to ED at  today since  thought she looked pale and was concerned w poss recurrent GIB    Previously at  early 09-2019for UGIB with acute anemia requiring multiple PRBC transfusions. s/p EGDs x 4 and>10 units PRBC. EGDs were unable to identify active source of bleeding.   Suspicion for dieulafoy, but unable to be acted upon at [place] with the multiple EGDs at times only showing adherent clot.   Surgery consult recommended ex-lap given severity, but patient's family deferred . Patient's PMD is Dr. Branham who recommended transfer to [facility] for evaluation by Dr. Ambrocio.  she was discharged yesterday.    EGD ([facility]) w Dr Borden 09-05 showed acute gastritis, gastric polyps with no source of GI bleed identified. neg for H.Pylori.   Enteroscopy 09-10 showed jejunum normal. small hiatal hernia.   09-11 PillCam (VCE) study+ active bleeding with large amount of blood noted. Bleeding site appeared to be in distal aspect of stomach.   09-13  Emergent EGD was performed again  after pt had bloody BM O/N but showed no evidence of active bleeding.    09-17 morning Pt w HR i140s w pain despite receiving pain medication. NGT was placed and aspirated approx 2-3cc of bring red blood. H/H was 7/21.7. S/p 2u PRBC today.  09-17 underwent urgent EGD again which failed to show active bleeding sites. Pt intubated for EGD and then extubated  Hospital course complicated by UTI/sepsis w Cx+ ESBL.  Bactiuria cleared after meropenem x 5 days      PAST MEDICAL HX  Anemia  chronic  Aortic dissection, thoracic  Type A Repaired 2009  Blindness of left eye  hx corneal transplant   Dorsalgia of lumbar region  on pain medication /baclofen po and pump  Hematoma  spinal  September  treated  September 2018  HTN (Hypertension)  on meds  Osteoporosis    PAD (peripheral artery disease)    Paraplegia in wheelchair goes to physical therapy 2 x weekly  Self-catheterizes urinary bladder    SDH subdural hematoma, nontraumatic  spontaneous  TIA (transient ischemic attack)    UTI (urinary tract infection) UTI. Ucx (09=)    + Pseudomonas and Klebsiella ESBL. Bacteruria    cleared after 5 day course of meropenum  Uveitis    PAST SURGICAL HX  Aortic dissection:       thoracic type A repair 2009      descending aneurysm repair   Baclofen pump  Corneal transplant L   Spinal laminectoies 2014  Ureteral J stent       FAMILY HX  no relevant family hx of GI or bleeding problems

## 2019-10-01 NOTE — CONSULT NOTE ADULT - SUBJECTIVE AND OBJECTIVE BOX
HPI:      PAST MEDICAL & SURGICAL HISTORY:  Subdural hematoma, nontraumatic: spontaneous  Dorsalgia of lumbar region: on pain medication /baclofen po and pump  Self-catheterizes urinary bladder  Anemia: chronic  Uveitis  Osteoporosis  PAD (peripheral artery disease)  Hematoma: spinal  September  treated  September 2018  Paraplegia: on wheelchair goes to physical therapy 2 x weekly  Aortic dissection, thoracic: Type A Repaired 2009  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2018  UTI (urinary tract infection): stable x 3 months  TIA (transient ischemic attack)  HTN (Hypertension): on meds  History of corneal transplant: left corneal transplant on 5/21/2018  Disorder of spine: unthetethering 2 x  Presence of IVC filter: 2014 ?  S/P aortic dissection repair: Type A Dissection repair /2009   descending aortic aneurysm repair 9/2016  H/O Spinal surgery: laminectomies 2014      MEDICATIONS  (STANDING):    MEDICATIONS  (PRN):  ondansetron Injectable 4 milliGRAM(s) IV Push every 6 hours PRN Nausea      Allergies    No Known Allergies    Intolerances        SOCIAL HISTORY:    FAMILY HISTORY:   Non-contributory    REVIEW OF SYSTEMS      General:	    Respiratory and Thorax:  	  Cardiovascular:	    Gastrointestinal:	    Musculoskeletal:	   Vital Signs Last 24 Hrs  T(C): 36.8 (01 Oct 2019 16:14), Max: 36.8 (01 Oct 2019 09:35)  T(F): 98.3 (01 Oct 2019 16:14), Max: 98.3 (01 Oct 2019 16:14)  HR: 108 (01 Oct 2019 16:14) (108 - 123)  BP: 148/75 (01 Oct 2019 16:14) (116/75 - 148/75)  BP(mean): --  RR: 14 (01 Oct 2019 16:14) (14 - 19)  SpO2: 100% (01 Oct 2019 16:14) (91% - 100%)    HEENT :No Pallor.No icterus. EOMI,PERLAA  Chest : Clear to Auscultation  CVS : S1S2 Normal.No murmurs.  Abdomen: Soft.Non tender .Normal bowel sounds.No Organomegaly.  CNS: Alert.Oriented to Time,Place and Person.No focal deficit.  EXT: Normal Range of motion.No pitting edema.    LABS:                        8.1    9.35  )-----------( 307      ( 01 Oct 2019 17:35 )             25.3     10-01    142  |  110<H>  |  13  ----------------------------<  123<H>  4.0   |  25  |  0.51    Ca    7.6<L>      01 Oct 2019 09:52  Phos  2.9     09-30  Mg     2.1     09-30    TPro  5.0<L>  /  Alb  2.1<L>  /  TBili  0.3  /  DBili  x   /  AST  12<L>  /  ALT  11<L>  /  AlkPhos  83  10-01    PT/INR - ( 01 Oct 2019 09:52 )   PT: 12.6 sec;   INR: 1.13 ratio         PTT - ( 01 Oct 2019 09:52 )  PTT:33.8 sec  LIVER FUNCTIONS - ( 01 Oct 2019 09:52 )  Alb: 2.1 g/dL / Pro: 5.0 gm/dL / ALK PHOS: 83 U/L / ALT: 11 U/L / AST: 12 U/L / GGT: x             RADIOLOGY & ADDITIONAL STUDIES:

## 2019-10-01 NOTE — ED PROVIDER NOTE - OBJECTIVE STATEMENT
69 y/o female with a PMHx of anemia, aortic dissection, dorsalgia, HTN, osteoporosis, PAD, paraplegia, TIA, uveitis presents to the ED c/o weakness and HTN. Pt has a bleed somewhere but they don't know where. Pt states her  looked at her and said she didn't look right and they called her cardiologist who said to come to ED. No vomiting, fever, sweats, chills. Pt hasn't noticed blood in her stool but she is unsure. Pt states she feels very thirsty. Pt last blood transfusion was last time pt was at [facility]. Dr. Basil Hernandez 69 y/o female with a PMHx of anemia, aortic dissection, dorsalgia, HTN, osteoporosis, PAD, paraplegia, TIA, uveitis presents to the ED c/o weakness and HTN. Pt has a bleed somewhere but they don't know where. Pt states her  looked at her and said she didn't look right and they called her cardiologist who said to come to ED. No vomiting, fever, sweats, chills. Pt hasn't noticed blood in her stool but she is unsure. Pt states she feels very thirsty. Pt last blood transfusion was last time pt was at [facility]. Friday pt was at [facility] where she got a unit of blood and then was transferred to [facility]. Dr. Basil Hernandez 69 y/o female with a PMHx of anemia, aortic dissection, dorsalgia, HTN, osteoporosis, PAD, paraplegia, TIA, uveitis presents to the ED c/o weakness and HTN. Pt has a bleed somewhere but they don't know where. Pt states her  looked at her and said she didn't look right and they called her cardiologist who said to come to ED. No vomiting, fever, sweats, chills. Pt hasn't noticed blood in her stool but she is unsure. Pt states she feels very thirsty. Pt last blood transfusion was last time pt was at [facility]. Friday pt was at [facility] where she got a unit of blood and then was transferred to [facility]. PCP: Dr. Basil Hernandez. GI: Dr. Donell Ambrocio (office [phone] mobile [phone])

## 2019-10-01 NOTE — H&P ADULT - ASSESSMENT
68 yr old female w hxf aortic dissection (post-repair several years prior), spinal cord hematoma (now functionally paraplegic), chronic spasticity+pain (on Oxycodone and Baclofen pump), HTN, nephrolithiasis s/p left urethral stent, UTIs and endotheliitis/keratitis (post-corneal transplant), with recent hospitalization at [facility] w discharge yesterday returned to ED at  w pallor, guaiac + stool and Hb 7.5    #acute GI bleed  source unclear despite multiple studies  1. admit to hospital  2. CBC q 6 hrs  3. clears  4. GI consult  5. IV protonix 40 mg daily    #chronic pain w spasticity and paraplegia  1. baclofen  2. gabapentin 800 mg TID  3. duloxetine 30 mg BID  4. prn oxycodone 10 mg  5. acetaminophan  6. lidoderm patch  7. hold NSAID gel  8. straight cath q 6 hrs    #HLD  1. hold vascepa  2. atorvastatin 40 mg    #HTN  1. labetalol 200 mg BID w parameters    #corneal tranplant failure chronic  1. valcyclovir  2. topical NS 2% 4 times a day  3. prednisolone 4 times a day  4. natural tears q 2 hrs prn      #Abn UA  1. please send cx    #sacral decub  1. wound care consult      IMPROVE VTE Individual Risk Assessment    RISK                                                                Points    [  ] Previous VTE                                                  3    [  ] Thrombophilia                                               2    [  ] Lower limb paralysis                                      2        (unable to hold up >15 seconds)      [  ] Current Cancer                                              2         (within 6 months)    [  ] Immobilization > 24 hrs                                1    [  ] ICU/CCU stay > 24 hours                              1    [ X ] Age > 60                                                      1    IMPROVE VTE Score ______1___    IMPROVE Score 0-1: Low Risk, No VTE prophylaxis required for most patients, encourage ambulation.   IMPROVE Score 2-3: At risk, pharmacologic VTE prophylaxis is indicated for most patients (in the absence of a contraindication)  IMPROVE Score > or = 4: High Risk, pharmacologic VTE prophylaxis is indicated for most patients (in the absence of a contraindication)

## 2019-10-01 NOTE — ED ADULT TRIAGE NOTE - CHIEF COMPLAINT QUOTE
pt brought by EMS from home for eval of weakness, dizziness,  GI bleed since this morning. pt w/ hx of UTI and GI bleed. recently dc'd from hospital yesterday.

## 2019-10-02 NOTE — DIETITIAN INITIAL EVALUATION ADULT. - PERTINENT MEDS FT
MEDICATIONS  (STANDING):  atorvastatin 40 milliGRAM(s) Oral at bedtime  baclofen 20 milliGRAM(s) Oral two times a day  DULoxetine 30 milliGRAM(s) Oral every 12 hours  gabapentin 800 milliGRAM(s) Oral three times a day  influenza   Vaccine 0.5 milliLiter(s) IntraMuscular once  labetalol 200 milliGRAM(s) Oral two times a day  lidocaine   Patch 1 Patch Transdermal every 24 hours  pantoprazole  Injectable 40 milliGRAM(s) IV Push daily  prednisoLONE acetate 1% Suspension 1 Drop(s) Left EYE four times a day  sodium chloride 2% Ophthalmic Solution 2 Drop(s) Left EYE four times a day  valACYclovir 1000 milliGRAM(s) Oral three times a day    MEDICATIONS  (PRN):  acetaminophen   Tablet .. 500 milliGRAM(s) Oral every 6 hours PRN Temp greater or equal to 38C (100.4F), Mild Pain (1 - 3)  artificial tears (preservative free) Ophthalmic Solution 1 Drop(s) Both EYES every 2 hours PRN Dry Eyes  docusate sodium 100 milliGRAM(s) Oral three times a day PRN Constipation  ondansetron Injectable 4 milliGRAM(s) IV Push every 6 hours PRN Nausea  oxyCODONE    IR 10 milliGRAM(s) Oral every 6 hours PRN Moderate Pain (4 - 6)  polyethylene glycol 3350 17 Gram(s) Oral daily PRN Constipation  senna 2 Tablet(s) Oral at bedtime PRN Constipation  zolpidem 5 milliGRAM(s) Oral at bedtime PRN Insomnia

## 2019-10-02 NOTE — PROGRESS NOTE ADULT - SUBJECTIVE AND OBJECTIVE BOX
CC: Symptomatic anemia History of Present Illness: 	  Pt is poor historian and  provided hx to ED.  Reviewed EMR    68 yr old female w hxf aortic dissection (post-repair several years prior), spinal cord hematoma (now functionally paraplegic), chronic spasticity+pain (on Oxycodone and Baclofen pump), HTN, nephrolithiasis s/p left urethral stent, UTIs and endotheliitis/keratitis (post-corneal transplant), with recent hospitalization at [facility] w discharge yesterday returned to ED at  today since  thought she looked pale and was concerned w poss recurrent GIB    Previously at  early for UGIB with acute anemia requiring multiple PRBC transfusions. s/p EGDs x 4 and>10 units PRBC. EGDs were unable to identify active source of bleeding.   Suspicion for dieulafoy, but unable to be acted upon at [place] with the multiple EGDs at times only showing adherent clot.   Surgery consult recommended ex-lap given severity, but patient's family deferred . Patient's PMD is Dr. Branham who recommended transfer to [facility] for evaluation by Dr. Ambrocio.  she was discharged yesterday.    EGD ([facility]) w Dr Borden  showed acute gastritis, gastric polyps with no source of GI bleed identified. neg for H.Pylori.   Enteroscopy 09-10 showed jejunum normal. small hiatal hernia.    PillCam (VCE) study+ active bleeding with large amount of blood noted. Bleeding site appeared to be in distal aspect of stomach.     Emergent EGD was performed again  after pt had bloody BM O/N but showed no evidence of active bleeding.     morning Pt w HR i140s w pain despite receiving pain medication. NGT was placed and aspirated approx 2-3cc of bring red blood. H/H was 7/.7. S/p 2u PRBC today.   underwent urgent EGD again which failed to show active bleeding sites. Pt intubated for EGD and then extubated  Hospital course complicated by UTI/sepsis w Cx+ ESBL.  Bactiuria cleared after meropenem x 5 days    10/2: Pt lying in bed, Hb trending down, no specific complaints however pt is frustrated she has had several procedures without clear source of bleeding.  No fever, chills, n, v.  Awaiting endoscopic evaluation.    REVIEW OF SYSTEMS: All other review of systems is negative unless indicated above.      Vital Signs Last 24 Hrs  T(C): 37 (02 Oct 2019 14:20), Max: 37.9 (02 Oct 2019 05:58)  T(F): 98.6 (02 Oct 2019 14:20), Max: 100.3 (02 Oct 2019 05:58)  HR: 86 (02 Oct 2019 14:20) (86 - 111)  BP: 87/36 (02 Oct 2019 14:20) (87/36 - 149/81)  BP(mean): --  RR: 18 (02 Oct 2019 14:20) (14 - 18)  SpO2: 94% (02 Oct 2019 14:20) (94% - 100%)    PHYSICAL EXAM:    Constitutional: NAD, awake and alert, frail and pale appearing  HEENT: PERR, EOMI, Normal Hearing, MMM  Neck: Soft and supple  Respiratory: Breath sounds are clear bilaterally, No wheezing, rales or rhonchi  Cardiovascular: S1 and S2, regular rate and rhythm, no Murmurs, gallops or rubs  Gastrointestinal: Bowel Sounds present, soft, nontender, nondistended, no guarding, no rebound  Extremities: No peripheral edema  Neurological: A/O x 3, no focal deficits in my limited exam  Skin: No rashes      MEDICATIONS  (STANDING):  atorvastatin 40 milliGRAM(s) Oral at bedtime  baclofen 20 milliGRAM(s) Oral two times a day  DULoxetine 30 milliGRAM(s) Oral every 12 hours  gabapentin 800 milliGRAM(s) Oral three times a day  influenza   Vaccine 0.5 milliLiter(s) IntraMuscular once  labetalol 200 milliGRAM(s) Oral two times a day  lidocaine   Patch 1 Patch Transdermal every 24 hours  pantoprazole  Injectable 40 milliGRAM(s) IV Push daily  prednisoLONE acetate 1% Suspension 1 Drop(s) Left EYE four times a day  sodium chloride 2% Ophthalmic Solution 2 Drop(s) Left EYE four times a day  valACYclovir 1000 milliGRAM(s) Oral three times a day    MEDICATIONS  (PRN):  acetaminophen   Tablet .. 500 milliGRAM(s) Oral every 6 hours PRN Temp greater or equal to 38C (100.4F), Mild Pain (1 - 3)  artificial tears (preservative free) Ophthalmic Solution 1 Drop(s) Both EYES every 2 hours PRN Dry Eyes  docusate sodium 100 milliGRAM(s) Oral three times a day PRN Constipation  ondansetron Injectable 4 milliGRAM(s) IV Push every 6 hours PRN Nausea  oxyCODONE    IR 10 milliGRAM(s) Oral every 6 hours PRN Moderate Pain (4 - 6)  polyethylene glycol 3350 17 Gram(s) Oral daily PRN Constipation  senna 2 Tablet(s) Oral at bedtime PRN Constipation  zolpidem 5 milliGRAM(s) Oral at bedtime PRN Insomnia    CARDIAC MARKERS ( 01 Oct 2019 09:52 )  <0.015 ng/mL / x     / x     / x     / x                                6.9    7.30  )-----------( 280      ( 02 Oct 2019 10:59 )             21.5     10-02    141  |  109<H>  |  31<H>  ----------------------------<  100<H>  3.9   |  28  |  0.29<L>    Ca    8.1<L>      02 Oct 2019 05:25  Mg     1.9     10-    TPro  5.0<L>  /  Alb  2.1<L>  /  TBili  0.3  /  DBili  x   /  AST  12<L>  /  ALT  11<L>  /  AlkPhos  83  10-    CAPILLARY BLOOD GLUCOSE        LIVER FUNCTIONS - ( 01 Oct 2019 09:52 )  Alb: 2.1 g/dL / Pro: 5.0 gm/dL / ALK PHOS: 83 U/L / ALT: 11 U/L / AST: 12 U/L / GGT: x           PT/INR - ( 01 Oct 2019 09:52 )   PT: 12.6 sec;   INR: 1.13 ratio         PTT - ( 01 Oct 2019 09:52 )  PTT:33.8 sec  Urinalysis Basic - ( 01 Oct 2019 12:22 )    Color: Yellow / Appearance: Slightly Turbid / S.010 / pH: x  Gluc: x / Ketone: Negative  / Bili: Negative / Urobili: Negative mg/dL   Blood: x / Protein: 30 mg/dL / Nitrite: Positive   Leuk Esterase: Moderate / RBC: 3-5 /HPF / WBC >50   Sq Epi: x / Non Sq Epi: Occasional / Bacteria: TNTC        Assessment and Plan:  68 yr old female w hxf aortic dissection (post-repair several years prior), spinal cord hematoma (now functionally paraplegic), chronic spasticity+pain (on Oxycodone and Baclofen pump), HTN, nephrolithiasis s/p left urethral stent, UTIs and endotheliitis/keratitis (post-corneal transplant), with recent hospitalization at [facility] presents with GI bleed.    #acute GI bleed with blood loss anemia: Unclear source  -serial CBC  -s/p 1u prbc, to give another today  -push entero/colonoscopy by GI  -IV protonix 40 mg daily    #chronic pain w spasticity and paraplegia  1. baclofen  2. gabapentin 800 mg TID  3. duloxetine 30 mg BID  4. prn oxycodone 10 mg  5. acetaminophan  6. lidoderm patch  7. hold NSAID gel  8. straight cath q 6 hrs    #HLD  1. hold vascepa  2. atorvastatin 40 mg    #HTN  1. labetalol 200 mg BID w parameters    #corneal tranplant failure chronic  1. valcyclovir  2. topical NS 2% 4 times a day  3. prednisolone 4 times a day  4. natural tears q 2 hrs prn      #Abn UA  1. please send cx    #sacral decub  1. wound care consult    DVT ppx:  -anticoagulation contraindicated in setting of acute bleed

## 2019-10-02 NOTE — DIETITIAN INITIAL EVALUATION ADULT. - ADD RECOMMEND
1) advance diet as tolerated to regular (texture modified prn) with milkshakes vanilla TID 2) add MVI with minerals, vitamin C 500mg BID, and zinc sulfate 220mg BID x 10 days to optimize wound healing 3) monitor length NPO, advancement/tolerance nutr source 4) daily wt checks

## 2019-10-02 NOTE — PROVIDER CONTACT NOTE (CRITICAL VALUE NOTIFICATION) - BACKGROUND
hx of Paraplegia, subdural hematoma, HTN, dorsalgia, left eye blindness, thoracic aortic dissection, PAD, TIA, spine disorder, hx of spine surgery, corneal transplant

## 2019-10-02 NOTE — DIETITIAN INITIAL EVALUATION ADULT. - PERTINENT LABORATORY DATA
10-02 Na141 mmol/L Glu 100 mg/dL<H> K+ 3.9 mmol/L Cr  0.29 mg/dL<L> BUN 31 mg/dL<H> Phos n/a   Alb n/a   PAB n/a

## 2019-10-02 NOTE — DIETITIAN INITIAL EVALUATION ADULT. - MALNUTRITION
severe malnutrition in chronic illness r/t decreased ability to consume sufficient energy/protein 2/2 GIB AEB meeting <75% of ENN x 1.5 months; mild edema; severe muscle loss; moderate fat wasting. severe malnutrition in chronic illness

## 2019-10-02 NOTE — CHART NOTE - NSCHARTNOTEFT_GEN_A_CORE
Upon Nutritional Assessment by the Registered Dietitian your patient was determined to meet criteria / has evidence of the following diagnosis/diagnoses:          [ ]  Mild Protein Calorie Malnutrition        [ ]  Moderate Protein Calorie Malnutrition        [x] Severe Protein Calorie Malnutrition        [ ] Unspecified Protein Calorie Malnutrition        [ ] Underweight / BMI <19        [ ] Morbid Obesity / BMI > 40      Findings:  severe malnutrition in chronic illness r/t decreased ability to consume sufficient energy/protein 2/2 GIB AEB meeting <75% of ENN x 1.5 months; mild edema; severe muscle loss; moderate fat wasting.    Findings as based on:  •  Comprehensive nutrition assessment and consultation  •  Calorie counts (nutrient intake analysis)  •  Food acceptance and intake status from observations by staff  •  Follow up  •  Patient education  •  Intervention secondary to interdisciplinary rounds  •   concerns      Treatment:    The following diet has been recommended:  1) advance diet as tolerated to regular (texture modified prn) with milkshakes vanilla TID   2) add MVI with minerals, vitamin C 500mg BID, and zinc sulfate 220mg BID x 10 days to optimize wound healing   3) monitor length NPO, advancement/tolerance nutr source   4) daily wt checks    PROVIDER Section:     By signing this assessment you are acknowledging and agree with the diagnosis/diagnoses assigned by the Registered Dietitian    Comments:

## 2019-10-02 NOTE — DIETITIAN INITIAL EVALUATION ADULT. - OTHER INFO
67yo female known to nutrition service from previous admission p/w symptomatic anemia: GIB.  Per MD note, pt is poor historian.  Pt with h/o recurrent upper GIB with recent d/c from [facility] EGD.  no obvious source of GIB.  pending EGD with push enteroscopy > colonoscopy.  BM (+) 10/2.

## 2019-10-02 NOTE — DIETITIAN INITIAL EVALUATION ADULT. - FACTORS AFF FOOD INTAKE
pt reports eating 3 full meals per day with no recent wt change.  pt dislikes ensure and gelatein.  However, previous RD notes state pt with inadequate PO intake x 1.5 months 2/2 multiple GIB and EGD's./other (specify)

## 2019-10-02 NOTE — DIETITIAN INITIAL EVALUATION ADULT. - PHYSICAL APPEARANCE
underweight/other (specify) NFPE significant for severe muscle wasting; temporal, clavicle, shoulder, thigh, and calf.  moderate fat wasting; tricep and rib.  mild edema: Lt/Rt foot.   manas score of 14: stage 2 PU on sacrum and stage 1 PU on Rt/Lt heel.

## 2019-10-03 NOTE — PROGRESS NOTE ADULT - SUBJECTIVE AND OBJECTIVE BOX
CC: Symptomatic anemia History of Present Illness: 	  Pt is poor historian and  provided hx to ED.  Reviewed EMR    68 yr old female w hxf aortic dissection (post-repair several years prior), spinal cord hematoma (now functionally paraplegic), chronic spasticity+pain (on Oxycodone and Baclofen pump), HTN, nephrolithiasis s/p left urethral stent, UTIs and endotheliitis/keratitis (post-corneal transplant), with recent hospitalization at [facility] w discharge yesterday returned to ED at  today since  thought she looked pale and was concerned w poss recurrent GIB    Previously at  early 09-2019for UGIB with acute anemia requiring multiple PRBC transfusions. s/p EGDs x 4 and>10 units PRBC. EGDs were unable to identify active source of bleeding.   Suspicion for dieulafoy, but unable to be acted upon at [place] with the multiple EGDs at times only showing adherent clot.   Surgery consult recommended ex-lap given severity, but patient's family deferred . Patient's PMD is Dr. Branham who recommended transfer to [facility] for evaluation by Dr. Ambrocio.  she was discharged yesterday.    EGD ([facility]) w Dr Borden 09-05 showed acute gastritis, gastric polyps with no source of GI bleed identified. neg for H.Pylori.   Enteroscopy 09-10 showed jejunum normal. small hiatal hernia.   09-11 PillCam (VCE) study+ active bleeding with large amount of blood noted. Bleeding site appeared to be in distal aspect of stomach.   09-13  Emergent EGD was performed again  after pt had bloody BM O/N but showed no evidence of active bleeding.    09-17 morning Pt w HR i140s w pain despite receiving pain medication. NGT was placed and aspirated approx 2-3cc of bring red blood. H/H was 7/21.7. S/p 2u PRBC today.  09-17 underwent urgent EGD again which failed to show active bleeding sites. Pt intubated for EGD and then extubated  Hospital course complicated by UTI/sepsis w Cx+ ESBL.  Bactiuria cleared after meropenem x 5 days    10/2: Pt lying in bed, Hb trending down, no specific complaints however pt is frustrated she has had several procedures without clear source of bleeding.  No fever, chills, n, v.  Awaiting endoscopic evaluation.  10/3: Hb stable, no overt bleeding.  Endoscopic evaluation negative for acute bleed.  Pt complaining of back pain.    REVIEW OF SYSTEMS: All other review of systems is negative unless indicated above.    Vital Signs Last 24 Hrs  T(C): 36.9 (03 Oct 2019 16:16), Max: 36.9 (03 Oct 2019 16:16)  T(F): 98.5 (03 Oct 2019 16:16), Max: 98.5 (03 Oct 2019 16:16)  HR: 88 (03 Oct 2019 16:16) (73 - 93)  BP: 123/57 (03 Oct 2019 16:16) (99/38 - 130/56)  BP(mean): --  RR: 18 (03 Oct 2019 16:16) (18 - 18)  SpO2: 96% (03 Oct 2019 16:16) (93% - 96%)    PHYSICAL EXAM:    Constitutional: NAD, awake and alert, frail and pale appearing  HEENT: PERR, EOMI, Normal Hearing, MMM  Neck: Soft and supple  Respiratory: Breath sounds are clear bilaterally, No wheezing, rales or rhonchi  Cardiovascular: S1 and S2, regular rate and rhythm, no Murmurs, gallops or rubs  Gastrointestinal: Bowel Sounds present, soft, nontender, nondistended, no guarding, no rebound  Extremities: No peripheral edema  Neurological: A/O x 3, no focal deficits in my limited exam  Skin: No rashes    MEDICATIONS  (STANDING):  atorvastatin 40 milliGRAM(s) Oral at bedtime  baclofen 20 milliGRAM(s) Oral two times a day  DULoxetine 30 milliGRAM(s) Oral every 12 hours  gabapentin 800 milliGRAM(s) Oral three times a day  influenza   Vaccine 0.5 milliLiter(s) IntraMuscular once  labetalol 200 milliGRAM(s) Oral two times a day  lidocaine   Patch 1 Patch Transdermal every 24 hours  pantoprazole    Tablet 40 milliGRAM(s) Oral two times a day  prednisoLONE acetate 1% Suspension 1 Drop(s) Left EYE four times a day  sodium chloride 2% Ophthalmic Solution 2 Drop(s) Left EYE four times a day  valACYclovir 1000 milliGRAM(s) Oral three times a day    MEDICATIONS  (PRN):  acetaminophen   Tablet .. 500 milliGRAM(s) Oral every 6 hours PRN Temp greater or equal to 38C (100.4F), Mild Pain (1 - 3)  ALPRAZolam 0.25 milliGRAM(s) Oral every 6 hours PRN anxiety  artificial tears (preservative free) Ophthalmic Solution 1 Drop(s) Both EYES every 2 hours PRN Dry Eyes  docusate sodium 100 milliGRAM(s) Oral three times a day PRN Constipation  morphine  - Injectable 2 milliGRAM(s) IV Push every 6 hours PRN breakthrough pain  ondansetron Injectable 4 milliGRAM(s) IV Push every 6 hours PRN Nausea  oxyCODONE    IR 10 milliGRAM(s) Oral every 6 hours PRN Moderate Pain (4 - 6)  polyethylene glycol 3350 17 Gram(s) Oral daily PRN Constipation  senna 2 Tablet(s) Oral at bedtime PRN Constipation  zolpidem 5 milliGRAM(s) Oral at bedtime PRN Insomnia                              7.8    4.44  )-----------( 244      ( 03 Oct 2019 08:36 )             24.9     10-02    141  |  109<H>  |  31<H>  ----------------------------<  100<H>  3.9   |  28  |  0.29<L>    Ca    8.1<L>      02 Oct 2019 05:25  Mg     1.9     10-02      CAPILLARY BLOOD GLUCOSE      Assessment and Plan:  68 yr old female w hxf aortic dissection (post-repair several years prior), spinal cord hematoma (now functionally paraplegic), chronic spasticity+pain (on Oxycodone and Baclofen pump), HTN, nephrolithiasis s/p left urethral stent, UTIs and endotheliitis/keratitis (post-corneal transplant), with recent hospitalization at [facility] presents with GI bleed.    #acute GI bleed with blood loss anemia: Unclear source  -monitor CBC  -s/p 2u prbc total this admission  -push entero/colonoscopy by GI neg for acute findings   -c/w oral PPI  -GI following, continue to monitor    #chronic pain w spasticity and paraplegia  1. baclofen  2. gabapentin 800 mg TID  3. duloxetine 30 mg BID  4. prn oxycodone 10 mg  5. acetaminophan  6. lidoderm patch  7. hold NSAID gel  8. straight cath q 6 hrs    #HLD  1. hold vascepa  2. atorvastatin 40 mg    #HTN  1. labetalol 200 mg BID w parameters    #corneal tranplant failure chronic  1. valcyclovir  2. topical NS 2% 4 times a day  3. prednisolone 4 times a day  4. natural tears q 2 hrs prn    #sacral decub  1. wound care consult    DVT ppx:  -anticoagulation contraindicated in setting of acute bleed

## 2019-10-03 NOTE — PROGRESS NOTE ADULT - ASSESSMENT
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

## 2019-10-03 NOTE — PROGRESS NOTE ADULT - SUBJECTIVE AND OBJECTIVE BOX
Patient is a 68y old  Female who presents with a chief complaint of symptomatic anemia, GIB (02 Oct 2019 15:10)      HPI:  pt feeling ok this AM  no active bleeding    PAST MEDICAL HX  Anemia  chronic  Aortic dissection, thoracic  Type A Repaired 2009  Blindness of left eye  hx corneal transplant   Dorsalgia of lumbar region  on pain medication /baclofen po and pump  Hematoma  spinal  September  treated  September 2018  HTN (Hypertension)  on meds  Osteoporosis    PAD (peripheral artery disease)    Paraplegia in wheelchair goes to physical therapy 2 x weekly  Self-catheterizes urinary bladder    SDH subdural hematoma, nontraumatic  spontaneous  TIA (transient ischemic attack)    UTI (urinary tract infection) UTI. Ucx (09=)    + Pseudomonas and Klebsiella ESBL. Bacteruria    cleared after 5 day course of meropenum  Uveitis    PAST SURGICAL HX  Aortic dissection:       thoracic type A repair 2009      descending aneurysm repair   Baclofen pump  Corneal transplant L   Spinal laminectoies 2014  Ureteral J stent     FAMILY HX  no relevant family hx of GI or bleeding problems (01 Oct 2019 17:11)    PAST MEDICAL & SURGICAL HISTORY:  Subdural hematoma, nontraumatic: spontaneous  Dorsalgia of lumbar region: on pain medication /baclofen po and pump  Self-catheterizes urinary bladder  Anemia: chronic  Uveitis  Osteoporosis  PAD (peripheral artery disease)  Hematoma: spinal  September  treated  September 2018  Paraplegia: on wheelchair goes to physical therapy 2 x weekly  Aortic dissection, thoracic: Type A Repaired 2009  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2018  UTI (urinary tract infection): stable x 3 months  TIA (transient ischemic attack)  HTN (Hypertension): on meds  History of corneal transplant: left corneal transplant on 5/21/2018  Disorder of spine: unthetethering 2 x  Presence of IVC filter: 2014 ?  S/P aortic dissection repair: Type A Dissection repair /2009   descending aortic aneurysm repair 9/2016  H/O Spinal surgery: laminectomies 2014    MEDICATIONS  (STANDING):  atorvastatin 40 milliGRAM(s) Oral at bedtime  baclofen 20 milliGRAM(s) Oral two times a day  DULoxetine 30 milliGRAM(s) Oral every 12 hours  gabapentin 800 milliGRAM(s) Oral three times a day  influenza   Vaccine 0.5 milliLiter(s) IntraMuscular once  labetalol 200 milliGRAM(s) Oral two times a day  lidocaine   Patch 1 Patch Transdermal every 24 hours  pantoprazole    Tablet 40 milliGRAM(s) Oral two times a day  prednisoLONE acetate 1% Suspension 1 Drop(s) Left EYE four times a day  sodium chloride 2% Ophthalmic Solution 2 Drop(s) Left EYE four times a day  valACYclovir 1000 milliGRAM(s) Oral three times a day    MEDICATIONS  (PRN):  acetaminophen   Tablet .. 500 milliGRAM(s) Oral every 6 hours PRN Temp greater or equal to 38C (100.4F), Mild Pain (1 - 3)  artificial tears (preservative free) Ophthalmic Solution 1 Drop(s) Both EYES every 2 hours PRN Dry Eyes  docusate sodium 100 milliGRAM(s) Oral three times a day PRN Constipation  ondansetron Injectable 4 milliGRAM(s) IV Push every 6 hours PRN Nausea  oxyCODONE    IR 10 milliGRAM(s) Oral every 6 hours PRN Moderate Pain (4 - 6)  polyethylene glycol 3350 17 Gram(s) Oral daily PRN Constipation  senna 2 Tablet(s) Oral at bedtime PRN Constipation  zolpidem 5 milliGRAM(s) Oral at bedtime PRN Insomnia    Allergies  No Known Allergies    REVIEW OF SYSTEMS:    CONSTITUTIONAL: weakness  RESPIRATORY: No cough, wheezing, hemoptysis; No shortness of breath  CARDIOVASCULAR: No chest pain or palpitations  GASTROINTESTINAL: as above  All other review of systems is negative unless indicated above.    Vital Signs Last 24 Hrs  T(C): 36.7 (03 Oct 2019 04:56), Max: 37 (02 Oct 2019 14:20)  T(F): 98.1 (03 Oct 2019 04:56), Max: 98.6 (02 Oct 2019 14:20)  HR: 73 (03 Oct 2019 04:56) (73 - 93)  BP: 99/38 (03 Oct 2019 04:56) (87/36 - 130/56)  BP(mean): --  RR: 18 (03 Oct 2019 04:56) (18 - 18)  SpO2: 96% (03 Oct 2019 04:56) (93% - 97%)    PHYSICAL EXAM:  Constitutional: NAD, appears chronically ill  Respiratory: CTA  Cardiovascular: S1 and S2, RRR  Gastrointestinal: BS+, soft, NT/ND    LABS:                        7.8    4.44  )-----------( 244      ( 03 Oct 2019 08:36 )             24.9     10-02    141  |  109<H>  |  31<H>  ----------------------------<  100<H>  3.9   |  28  |  0.29<L>    Ca    8.1<L>      02 Oct 2019 05:25  Mg     1.9     10-02            RADIOLOGY & ADDITIONAL STUDIES:

## 2019-10-03 NOTE — PROVIDER CONTACT NOTE (OTHER) - SITUATION
notified Dr Branham's office of admission
Tele service spoke with Morena to advise MD of routine consult.

## 2019-10-04 NOTE — CHART NOTE - NSCHARTNOTEFT_GEN_A_CORE
Pt found to have ESBL on urine culture however pt is asymptomatic. without leukocytosis or fever therefore no indication to treat at this time.  She can follow up with her outpatient provider non-urgently for  care.

## 2019-10-04 NOTE — DISCHARGE NOTE NURSING/CASE MANAGEMENT/SOCIAL WORK - NSDCPETBCESMAN_GEN_ALL_CORE
Chest Pain: Care Instructions  Your Care Instructions    There are many things that can cause chest pain. Some are not serious and will get better on their own in a few days. But some kinds of chest pain need more testing and treatment. Your doctor may have recommended a follow-up visit in the next 8 to 12 hours. If you are not getting better, you may need more tests or treatment. Even though your doctor has released you, you still need to watch for any problems. The doctor carefully checked you, but sometimes problems can develop later. If you have new symptoms or if your symptoms do not get better, get medical care right away. If you have worse or different chest pain or pressure that lasts more than 5 minutes or you passed out (lost consciousness), call 911 or seek other emergency help right away. A medical visit is only one step in your treatment. Even if you feel better, you still need to do what your doctor recommends, such as going to all suggested follow-up appointments and taking medicines exactly as directed. This will help you recover and help prevent future problems. How can you care for yourself at home? · Rest until you feel better. · Take your medicine exactly as prescribed. Call your doctor if you think you are having a problem with your medicine. · Do not drive after taking a prescription pain medicine. When should you call for help? Call 911 if:  ? · You passed out (lost consciousness). ? · You have severe difficulty breathing. ? · You have symptoms of a heart attack. These may include:  ¨ Chest pain or pressure, or a strange feeling in your chest.  ¨ Sweating. ¨ Shortness of breath. ¨ Nausea or vomiting. ¨ Pain, pressure, or a strange feeling in your back, neck, jaw, or upper belly or in one or both shoulders or arms. ¨ Lightheadedness or sudden weakness. ¨ A fast or irregular heartbeat.   After you call 911, the  may tell you to chew 1 adult-strength or 2 to 4 low-dose aspirin. Wait for an ambulance. Do not try to drive yourself. ?Call your doctor today if:  ? · You have any trouble breathing. ? · Your chest pain gets worse. ? · You are dizzy or lightheaded, or you feel like you may faint. ? · You are not getting better as expected. ? · You are having new or different chest pain. Where can you learn more? Go to http://fatuma-neris.info/. Enter A120 in the search box to learn more about \"Chest Pain: Care Instructions. \"  Current as of: March 20, 2017  Content Version: 11.4  © 2260-0219 [employer]. Care instructions adapted under license by [employer] (which disclaims liability or warranty for this information). If you have questions about a medical condition or this instruction, always ask your healthcare professional. [address] any warranty or liability for your use of this information. If you are a smoker, it is important for your health to stop smoking. Please be aware that second hand smoke is also harmful.

## 2019-10-04 NOTE — PROGRESS NOTE ADULT - SUBJECTIVE AND OBJECTIVE BOX
Patient is a 68y old  Female who presents with a chief complaint of symptomatic anemia, GIB (03 Oct 2019 16:43)    HPI:  Pt is poor historian and  provided hx to ED.  Reviewed EMR    68 yr old female w hxf aortic dissection (post-repair several years prior), spinal cord hematoma (now functionally paraplegic), chronic spasticity+pain (on Oxycodone and Baclofen pump), HTN, nephrolithiasis s/p left urethral stent, UTIs and endotheliitis/keratitis (post-corneal transplant), with recent hospitalization at [facility] w discharge yesterday returned to ED at  today since  thought she looked pale and was concerned w poss recurrent GIB    Previously at  early 09-2019for UGIB with acute anemia requiring multiple PRBC transfusions. s/p EGDs x 4 and>10 units PRBC. EGDs were unable to identify active source of bleeding.   Suspicion for dieulafoy, but unable to be acted upon at [place] with the multiple EGDs at times only showing adherent clot.   Surgery consult recommended ex-lap given severity, but patient's family deferred . Patient's PMD is Dr. Branham who recommended transfer to [facility] for evaluation by Dr. Ambrocio.  she was discharged yesterday.    EGD ([facility]) w Dr Borden 09-05 showed acute gastritis, gastric polyps with no source of GI bleed identified. neg for H.Pylori.   Enteroscopy 09-10 showed jejunum normal. small hiatal hernia.   09-11 PillCam (VCE) study+ active bleeding with large amount of blood noted. Bleeding site appeared to be in distal aspect of stomach.   09-13  Emergent EGD was performed again  after pt had bloody BM O/N but showed no evidence of active bleeding.    09-17 morning Pt w HR i140s w pain despite receiving pain medication. NGT was placed and aspirated approx 2-3cc of bring red blood. H/H was 7/21.7. S/p 2u PRBC today.  09-17 underwent urgent EGD again which failed to show active bleeding sites. Pt intubated for EGD and then extubated  Hospital course complicated by UTI/sepsis w Cx+ ESBL.  Bactiuria cleared after meropenem x 5 days    10/4/2019-  Pt feels fine, no complaints.   Wants to go home.     PAST MEDICAL HX  Anemia  chronic  Aortic dissection, thoracic  Type A Repaired 2009  Blindness of left eye  hx corneal transplant   Dorsalgia of lumbar region  on pain medication /baclofen po and pump  Hematoma  spinal  September  treated  September 2018  HTN (Hypertension)  on meds  Osteoporosis    PAD (peripheral artery disease)    Paraplegia in wheelchair goes to physical therapy 2 x weekly  Self-catheterizes urinary bladder    SDH subdural hematoma, nontraumatic  spontaneous  TIA (transient ischemic attack)    UTI (urinary tract infection) UTI. Ucx (09=)    + Pseudomonas and Klebsiella ESBL. Bacteruria    cleared after 5 day course of meropenum  Uveitis    PAST SURGICAL HX  Aortic dissection:       thoracic type A repair 2009      descending aneurysm repair   Baclofen pump  Corneal transplant L   Spinal laminectoies 2014  Ureteral J stent       FAMILY HX  no relevant family hx of GI or bleeding problems (01 Oct 2019 17:11)    PAST MEDICAL & SURGICAL HISTORY:  Subdural hematoma, nontraumatic: spontaneous  Dorsalgia of lumbar region: on pain medication /baclofen po and pump  Self-catheterizes urinary bladder  Anemia: chronic  Uveitis  Osteoporosis  PAD (peripheral artery disease)  Hematoma: spinal  September  treated  September 2018  Paraplegia: on wheelchair goes to physical therapy 2 x weekly  Aortic dissection, thoracic: Type A Repaired 2009  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2018  UTI (urinary tract infection): stable x 3 months  TIA (transient ischemic attack)  HTN (Hypertension): on meds  History of corneal transplant: left corneal transplant on 5/21/2018  Disorder of spine: unthetethering 2 x  Presence of IVC filter: 2014 ?  S/P aortic dissection repair: Type A Dissection repair /2009   descending aortic aneurysm repair 9/2016  H/O Spinal surgery: laminectomies 2014    MEDICATIONS  (STANDING):  atorvastatin 40 milliGRAM(s) Oral at bedtime  baclofen 20 milliGRAM(s) Oral two times a day  DULoxetine 30 milliGRAM(s) Oral every 12 hours  gabapentin 800 milliGRAM(s) Oral three times a day  influenza   Vaccine 0.5 milliLiter(s) IntraMuscular once  labetalol 200 milliGRAM(s) Oral two times a day  lidocaine   Patch 1 Patch Transdermal every 24 hours  pantoprazole    Tablet 40 milliGRAM(s) Oral two times a day  prednisoLONE acetate 1% Suspension 1 Drop(s) Left EYE four times a day  sodium chloride 2% Ophthalmic Solution 2 Drop(s) Left EYE four times a day  valACYclovir 1000 milliGRAM(s) Oral three times a day    MEDICATIONS  (PRN):  acetaminophen   Tablet .. 500 milliGRAM(s) Oral every 6 hours PRN Temp greater or equal to 38C (100.4F), Mild Pain (1 - 3)  ALPRAZolam 0.25 milliGRAM(s) Oral every 6 hours PRN anxiety  artificial tears (preservative free) Ophthalmic Solution 1 Drop(s) Both EYES every 2 hours PRN Dry Eyes  docusate sodium 100 milliGRAM(s) Oral three times a day PRN Constipation  morphine  - Injectable 2 milliGRAM(s) IV Push every 6 hours PRN breakthrough pain  ondansetron Injectable 4 milliGRAM(s) IV Push every 6 hours PRN Nausea  oxyCODONE    IR 10 milliGRAM(s) Oral every 6 hours PRN Moderate Pain (4 - 6)  polyethylene glycol 3350 17 Gram(s) Oral daily PRN Constipation  senna 2 Tablet(s) Oral at bedtime PRN Constipation  zolpidem 5 milliGRAM(s) Oral at bedtime PRN Insomnia    Allergies  No Known Allergies    REVIEW OF SYSTEMS:  CONSTITUTIONAL: No weakness, fevers or chills  RESPIRATORY: No cough, wheezing, hemoptysis; No shortness of breath  CARDIOVASCULAR: No chest pain or palpitations  GASTROINTESTINAL: Per HPI.     Vital Signs Last 24 Hrs  T(C): 36.8 (04 Oct 2019 05:18), Max: 36.9 (03 Oct 2019 16:16)  T(F): 98.3 (04 Oct 2019 05:18), Max: 98.5 (03 Oct 2019 16:16)  HR: 68 (04 Oct 2019 05:18) (68 - 88)  BP: 111/55 (04 Oct 2019 05:18) (102/57 - 123/57)  BP(mean): --  RR: 18 (04 Oct 2019 05:18) (17 - 18)  SpO2: 96% (04 Oct 2019 05:18) (94% - 96%)    PHYSICAL EXAM:  Constitutional: , NAD, functional paraplegic.   Respiratory: CTAB  Cardiovascular: S1 and S2, RRR, no M/G/R  Gastrointestinal: BS+, soft, NT/ND    LABS:                        8.1    4.27  )-----------( 218      ( 04 Oct 2019 09:37 )             26.4     10-04    142  |  109<H>  |  15  ----------------------------<  95  3.7   |  28  |  0.55    Ca    8.2<L>      04 Oct 2019 09:37            RADIOLOGY & ADDITIONAL STUDIES:

## 2019-10-04 NOTE — DISCHARGE NOTE NURSING/CASE MANAGEMENT/SOCIAL WORK - PATIENT PORTAL LINK FT
You can access the FollowMyHealth Patient Portal offered by [facility] by registering at the following website: [email or web address]. By joining [facility]’s FollowMyHealth portal, you will also be able to view your health information using other applications (apps) compatible with our system.

## 2019-10-04 NOTE — PROGRESS NOTE ADULT - ASSESSMENT
69y/o female with recurrent UGI bleeding s/p an extensive work up in [place] and previously by Dr. Yoo.   no active bleeding, h/h stable.   tolerating solids.   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  pt to follow up with gi as outpatient.   discussed with pt in length, Dr. Stephens, Dr. Conway.

## 2019-10-04 NOTE — DISCHARGE NOTE PROVIDER - HOSPITAL COURSE
CC: Symptomatic anemia    History of Present Illness:     Pt is poor historian and  provided hx to ED.  Reviewed EMR        68 yr old female w hxf aortic dissection (post-repair several years prior), spinal cord hematoma (now functionally paraplegic), chronic spasticity+pain (on Oxycodone and Baclofen pump), HTN, nephrolithiasis s/p left urethral stent, UTIs and endotheliitis/keratitis (post-corneal transplant), with recent hospitalization at [facility] w discharge yesterday returned to ED at  today since  thought she looked pale and was concerned w poss recurrent GIB        Previously at  early 09-2019for UGIB with acute anemia requiring multiple PRBC transfusions. s/p EGDs x 4 and>10 units PRBC. EGDs were unable to identify active source of bleeding.   Suspicion for dieulafoy, but unable to be acted upon at [facility] with the multiple EGDs at times only showing adherent clot.     Surgery consult recommended ex-lap given severity, but patient's family deferred . Patient's PMD is Dr. Branham who recommended transfer to [facility] for evaluation by Dr. Ambrocio.  she was discharged yesterday.        EGD ([facility]) w Dr Borden 09-05 showed acute gastritis, gastric polyps with no source of GI bleed identified. neg for H.Pylori.     Enteroscopy 09-10 showed jejunum normal. small hiatal hernia.     09-11 PillCam (VCE) study+ active bleeding with large amount of blood noted. Bleeding site appeared to be in distal aspect of stomach.     09-13  Emergent EGD was performed again  after pt had bloody BM O/N but showed no evidence of active bleeding.      09-17 morning Pt w HR i140s w pain despite receiving pain medication. NGT was placed and aspirated approx 2-3cc of bring red blood. H/H was 7/21.7. S/p 2u PRBC today.    09-17 underwent urgent EGD again which failed to show active bleeding sites. Pt intubated for EGD and then extubated    Hospital course complicated by UTI/sepsis w Cx+ ESBL.  Bactiuria cleared after meropenem x 5 days        10/2: Pt lying in bed, Hb trending down, no specific complaints however pt is frustrated she has had several procedures without clear source of bleeding.  No fever, chills, n, v.  Awaiting endoscopic evaluation.    10/3: Hb stable, no overt bleeding.  Endoscopic evaluation negative for acute bleed.  Pt complaining of back pain.    10/4: No further bleeding, Hb stable, today 8.1 from 7.8.  Pt Denies fever, chills, N, V, abd pain, CP, SOB.        REVIEW OF SYSTEMS: All other review of systems is negative unless indicated above.        Vital Signs Last 24 Hrs    T(C): 36.7 (04 Oct 2019 11:56), Max: 36.9 (03 Oct 2019 16:16)    T(F): 98 (04 Oct 2019 11:56), Max: 98.5 (03 Oct 2019 16:16)    HR: 69 (04 Oct 2019 11:56) (68 - 88)    BP: 100/49 (04 Oct 2019 11:56) (100/49 - 123/57)    BP(mean): --    RR: 20 (04 Oct 2019 11:56) (17 - 20)    SpO2: 97% (04 Oct 2019 11:56) (96% - 97%)        PHYSICAL EXAM:        Constitutional: NAD, awake and alert, frail and pale appearing    HEENT: PERR, EOMI, Normal Hearing, MMM    Neck: Soft and supple    Respiratory: Breath sounds are clear bilaterally, No wheezing, rales or rhonchi    Cardiovascular: S1 and S2, regular rate and rhythm, no Murmurs, gallops or rubs    Gastrointestinal: Bowel Sounds present, soft, nontender, nondistended, no guarding, no rebound    Extremities: No peripheral edema    Neurological: A/O x 3, no focal deficits in my limited exam    Skin: No rashes        meds/labs: Reviewed        Assessment and Plan:  68 yr old female w hxf aortic dissection (post-repair several years prior), spinal cord hematoma (now functionally paraplegic), chronic spasticity+pain (on Oxycodone and Baclofen pump), HTN, nephrolithiasis s/p left urethral stent, UTIs and endotheliitis/keratitis (post-corneal transplant), with recent hospitalization at [facility] presents with GI bleed.        #acute GI bleed with blood loss anemia: Unclear source - no further bleeding at this time.    -s/p 2u prbc total this admission    -push entero/colonoscopy by GI neg for acute findings     -c/w oral PPI    -Hb stable    -GI follow up outpatient    -repeat cbc Monday via homecare.        #chronic pain w spasticity and paraplegia    1. baclofen    2. gabapentin 800 mg TID    3. duloxetine 30 mg BID    4. prn oxycodone 10 mg    5. acetaminophan    6. lidoderm patch    7. hold NSAID gel    8. straight cath q 6 hrs        #HLD    1. hold vascepa    2. atorvastatin 40 mg        #HTN    1. labetalol 200 mg BID w parameters        #corneal tranplant failure chronic    1. valcyclovir    2. topical NS 2% 4 times a day    3. prednisolone 4 times a day    4. natural tears q 2 hrs prn        #sacral decub    1. wound care         Attending Statement: 40 minutes spent on total encounter and discharge planning.

## 2019-10-04 NOTE — DISCHARGE NOTE PROVIDER - NSDCCPCAREPLAN_GEN_ALL_CORE_FT
PRINCIPAL DISCHARGE DIAGNOSIS  Diagnosis: Anemia due to GI blood loss  Assessment and Plan of Treatment: Resolved, follow up closely outpatient. Resume home meds.

## 2019-10-07 NOTE — H&P ADULT - PROBLEM SELECTOR PLAN 7
DVT PPx: IMPROVE = 2 in the setting of GIB AC contraindicated SCDs  Diet: NPO for now, ADAT as per GI

## 2019-10-07 NOTE — H&P ADULT - NSHPPHYSICALEXAM_GEN_ALL_CORE
PHYSICAL EXAM:  GENERAL: NAD, well-groomed, well-developed  HEAD: Atraumatic, Normocephalic  EYES: EOMI, PERRLA, conjunctiva and sclera clear  ENMT: No tonsillar erythema, exudates, or enlargement; Moist mucous membranes  NECK: Supple, No JVD, Normal Thyroid  NERVOUS SYSTEM: Alert & Oriented X3, Good concentration; Motor Strength 5/5 B/L upper and lower extremities  CHEST/LUNG: Clear to auscultation bilaterally; No rales, rhonchi, wheezing, or rubs  HEART: Regular rate and rhythm; S1 and S2; No murmurs, rubs, or gallops  ABDOMEN: Soft, Nontender, Nondistended: Bowel sounds present  EXTREMITIES: 2+ Peripheral Pulses, No clubbing, cyanosis, or edema  LYMPH: No lymphadenopathy noted  SKIN: No rashes or lesions PHYSICAL EXAM:  GENERAL: mild distress  HEAD: Atraumatic, Normocephalic  EYES: EOMI, PERRLA, conjunctiva and sclera clear  ENMT: No tonsillar erythema, exudates, or enlargement; Moist mucous membranes  NECK: Supple,   NERVOUS SYSTEM: Alert & Oriented X3 5/5 strength in the UE with 2-3/5 strength in LE. Sensation intact throughout  CHEST/LUNG: Clear to auscultation bilaterally; No rales, rhonchi, wheezing  HEART: Tachycardic with regular rhythm; S1 and S2; RUSB systolic crescendo-decrescendo murmur  ABDOMEN: Soft, mildly diffusely tender, Nondistended: Bowel sounds present. Has RUQ baclofen pump.  BACK: No CVA tenderness, has a stage 2 pressure ulcer   RECTAL: deferred, ED documented had a large tarry stool in the ED  EXTREMITIES: 2+ Peripheral Pulses, No edema  LYMPH: No lymphadenopathy noted  SKIN: No rashes or lesions

## 2019-10-07 NOTE — H&P ADULT - PROBLEM SELECTOR PLAN 6
DVT PPx: IMPROVE = 2 in the setting of GIB AC contraindicated SCDs  Diet: NPO for now, ADAT as per GI - Continue Valacyclovir 1000mg TID

## 2019-10-07 NOTE — H&P ADULT - HISTORY OF PRESENT ILLNESS
Ms. Portillo is a 68 yr old female w hxf aortic dissection (post-repair several years prior), spinal cord hematoma (now functionally paraplegic), chronic spasticity+pain (on Oxycodone and Baclofen pump), HTN, nephrolithiasis s/p left urethral stent, UTIs and endotheliitis/keratitis (post-corneal transplant) presenting with black tarry stools.     In the ED:  Vitals: 97.5F  /68 HR 97 RR 18 SpO2 96% on RA  Labs: H/H 7.8/24.8, BUN 25, Calcium 8.2 with Albumin 2.6 Lactate 1.9 pCO2 54 pO2 23 UA WBC 49, +leuk esterase and many bacteria  Imaging: CXR negative for infection   Interventions: Became hypotensive 79/50 responded well to 2L of NS x 1, Acetaminophen 1g x 1 and 40 IVP of Protonix x 1 Ms. Portillo is a 68 yr old female w hxf aortic dissection (post-repair several years prior), spinal cord hematoma (now functionally paraplegic), chronic spasticity+pain (on Oxycodone and Baclofen pump), HTN, nephrolithiasis s/p left urethral stent, UTIs and endotheliitis/keratitis (post-corneal transplant) presenting with black tarry stools.  at bedside reports patient had a black bowel movement this AM. Patient typically displays a certain prodrome of symptoms (paleness, abdominal pain, nausea, weakness, and tachycardia). Has had multiple recent hospitalizations with multiple EGDs in an attempt to find an identifying source. In early September s/p EGDs x 4 with no identifiable source of active bleeding but requiring > 10U of pRBCs. Suspicion was Dieulafoy but unable to act at [facility], recommendation was possible ex-lap which the PCP was not in favor of and patient/ agreed. Later that month had a repeat EGD showed gastritis but no acute bleeding. Enteroscopy was negative. Pillcam study a few days later showed active bleeding with clot in the distal stomach, emergent EGD a few days later for blood bowel movement overnight was negative. Required 2U of pRBCs for blood loss with repeat EGD negative. Recently hospitalized on 10/1 due to concern for possible GIB with EGD negative and discharged on 10/4. Denies NSAID use. No recent ASA use.     In the ED:  Vitals: 97.5F  /68 HR 97 RR 18 SpO2 96% on RA  Labs: H/H 7.8/24.8, BUN 25, Calcium 8.2 with Albumin 2.6 Lactate 1.9 pCO2 54 pO2 23 UA WBC 49, +leuk esterase and many bacteria  Imaging: CXR negative for infection   Interventions: Became hypotensive 79/50 responded well to 2L of NS x 1, Acetaminophen 1g x 1 and 40 IVP of Protonix x 1

## 2019-10-07 NOTE — ED PROVIDER NOTE - CLINICAL SUMMARY MEDICAL DECISION MAKING FREE TEXT BOX
Attending MD Hollis: 68 yr old female w hxf aortic dissection (post-repair several years prior), spinal cord hematoma (now functionally paraplegic), chronic spasticity+pain (on Oxycodone and Baclofen pump), HTN, nephrolithiasis s/p left urethral stent, UTIs and endotheliitis/keratitis (post-corneal transplant), with recent hospitalization at [facility] and discharged on 10/5 returned to ED today for persistent black tarry stool and weakness today.  BP 70/50, fluid given no SBP 90s.  Pale appearing, MMM, no abd tenderness to palpation, diaper with tarry black stool.  DDX UGI bleed,  Plan: labs, type and screen, transfuse as needed, GI consult.

## 2019-10-07 NOTE — H&P ADULT - NSHPSOCIALHISTORY_GEN_ALL_CORE
Lives with  and has an aide. Fully dependent on others. No smoking, alcohol or drug use in the past.

## 2019-10-07 NOTE — ED PROVIDER NOTE - OBJECTIVE STATEMENT
Gaudencio SCHRADER MD PGY2: 68 F hxf aortic dissection (post-repair several years prior), spinal cord hematoma (now functionally paraplegic), chronic spasticity+pain (on Oxycodone and Baclofen pump), HTN, nephrolithiasis s/p left urethral stent, UTIs and endotheliitis/keratitis (post-corneal transplant), with recent hospitalization at [facility] oct 4th here for recurrent weakness and melanotic stool. Since discharge patient has been having persistent lack of appetite, and has been having large melatonic stools x 1 day. Weakness acutely worsened x 1 day. No cough, fevers, chills.

## 2019-10-07 NOTE — CHART NOTE - NSCHARTNOTEFT_GEN_A_CORE
Reference #: [id number]    Others' Prescriptions  Patient Name:	Marce Portillo	YOB: 1950  Address:	[address] [place] [address]	Sex:	Female  Rx Written	Rx Dispensed	Drug	Quantity	Days Supply	Prescriber Name  09/24/2019	09/26/2019	oxycodone hcl 10 mg tablet	15	5	Mariana Lopez  09/24/2019	09/26/2019	zolpidem tartrate 5 mg tablet	5	5	Mariana Lopez ANP  07/29/2019	08/03/2019	oxycodone hcl 10 mg tablet	60	30	Arriaga, Tram PA  07/15/2019	07/15/2019	zolpidem tartrate 5 mg tablet	30	30	Arriaga, Tram PA  07/01/2019	07/02/2019	oxycodone hcl 10 mg tablet	60	30	Arriaga, Tram PA  06/13/2019	06/14/2019	zolpidem tartrate 5 mg tablet	30	30	Arriaga, Tram PA  05/21/2019	05/24/2019	oxycodone hcl 10 mg tablet	60	30	Arriaga, Tram PA  05/07/2019	05/09/2019	zolpidem tartrate 5 mg tablet	30	30	Arriaga, Tram PA  04/25/2019	05/04/2019	oxycodone hcl 10 mg tablet	60	15	Arriaga, Tram PA  04/09/2019	04/10/2019	zolpidem tartrate 5 mg tablet	30	30	Victorino Nair DO  04/10/2019	04/10/2019	oxycodone hcl 10 mg tablet	60	15	Arriaga, Tram PA  02/26/2019	02/26/2019	zolpidem tartrate 5 mg tablet	30	30	Belinda Mckenzie)  02/15/2019	02/17/2019	lyrica 100 mg capsule	60	30	Victorino Arias MD  02/15/2019	02/17/2019	oxycontin er 10 mg tablet	14	7	Victorino Arias MD  02/13/2019	02/14/2019	oxycodone hcl 10 mg tablet	180	30	Belinda Mckenzie)  01/18/2019	01/23/2019	zolpidem tartrate 5 mg tablet	30	30	Belinda Mckenzie)  12/24/2018	12/24/2018	zolpidem tartrate 5 mg tablet	30	30	Belinda Mckenzie (MD)  12/11/2018	12/20/2018	oxycodone hcl 5 mg tablet	120	30	Belinda Mckenzie (MD)  12/12/2018	12/13/2018	oxycodone hcl 10 mg tablet	120	30	Malena Jalloh (NP)  12/04/2018	12/04/2018	methadone hcl 5 mg tablet	60	30	Belinda Mckenzie (MD)  11/14/2018	11/19/2018	zolpidem tartrate 5 mg tablet	30	30	Belinda Mckenzie (MD)  11/06/2018	11/12/2018	diazepam 5 mg tablet	120	30	Belinda Mckenzie (MD)  11/06/2018	11/12/2018	oxycodone hcl 5 mg tablet	120	20	Belinda Mckenzie (MD)  10/17/2018	10/18/2018	zolpidem tartrate 5 mg tablet	30	30	Belinda Mckenzie)  10/17/2018	10/17/2018	oxycodone hcl 5 mg tablet	100	30	Victorino Nair DO  10/04/2018	10/08/2018	methadone hcl 5 mg tablet	60	30	Belinda Mckenzie (MD)  Patient Name:	Marce Portillo	YOB: 1950  [place]:	 [place] [address]	Sex:	Female  Rx Written	Rx Dispensed	Drug	Quantity	Days Supply	Prescriber Name  08/23/2019	08/23/2019	morphine sulfate powder *	0gm	30	Arriaga, Tram PA  06/03/2019	06/03/2019	morphine sulfate powder *	0gm	30	Arriaga, Tram PA  04/11/2019	04/11/2019	morphine sulfate powder *	0gm	30	Arriaga, Tram PA  Patient Name:	Marce Portillo	YOB: 1950  [address] [place] [address]	Sex:	Female  Rx Written	Rx Dispensed	Drug	Quantity	Days Supply	Prescriber Name  12/12/2018	12/14/2018	levorphanol 2 mg tablet	90	30	Malena Jalloh (NP)

## 2019-10-07 NOTE — CONSULT NOTE ADULT - ASSESSMENT
In summary, urgent EGD performed (see report) and again negative for any blood in the stomach or active bleeding    Rec:    Video capsule endoscopy.  NPO after midnight.    Donell Ambrocio MD

## 2019-10-07 NOTE — H&P ADULT - PROBLEM SELECTOR PLAN 3
UA with +leuk esterase, many bacteria and WBC 49. Previous urine culture grew ESBL Klebsiella on 10/1 not treated but previous hx of ESBL Klebsiella 2/2 sepsis was treated with Meropenem. Currently asymptomatic, possibly 2/2 to colonization.  - Urine culture is pending, if patient is symptomatic, develops fevers or is hemodynamically unstable will treat with Meropenem (sensitive in the past) Due to paraplegia urine is catheterized. Has a left ureteral stent, hx of nephrolithiasis. UA with +leuk esterase, many bacteria and WBC 49. Previous urine culture grew ESBL Klebsiella on 10/1 not treated but previous hx of ESBL Klebsiella 2/2 sepsis was treated with Meropenem. Currently asymptomatic, possibly 2/2 to colonization.  - Urine culture is pending, if patient is symptomatic, develops fevers or is hemodynamically unstable will treat with Meropenem (sensitive in the past)

## 2019-10-07 NOTE — ED ADULT NURSE REASSESSMENT NOTE - NS ED NURSE REASSESS COMMENT FT1
1530 Nessa from endoscopy called to alert pt was added on for an endoscopy today. Will be sending transport for pt around 1630. Pt last PO intake this morning (water when taking her pills).

## 2019-10-07 NOTE — ED ADULT NURSE NOTE - NSIMPLEMENTINTERV_GEN_ALL_ED
Implemented All Fall with Harm Risk Interventions:  [place] to call system. Call bell, personal items and telephone within reach. Instruct patient to call for assistance. Room bathroom lighting operational. Non-slip footwear when patient is off stretcher. Physically safe environment: no spills, clutter or unnecessary equipment. Stretcher in lowest position, wheels locked, appropriate side rails in place. Provide visual cue, wrist band, yellow gown, etc. Monitor gait and stability. Monitor for mental status changes and reorient to person, place, and time. Review medications for side effects contributing to fall risk. Reinforce activity limits and safety measures with patient and family. Provide visual clues: red socks.

## 2019-10-07 NOTE — PATIENT PROFILE ADULT - FALL HARM RISK
coagulation(Bleeding disorder R/T clinical cond/anti-coags)/other/bones(Osteoporosis,prev fx,steroid use,metastatic bone ca)/paraplegia

## 2019-10-07 NOTE — H&P ADULT - NSHPREVIEWOFSYSTEMS_GEN_ALL_CORE
REVIEW OF SYSTEMS:    CONSTITUTIONAL: No weakness, fevers or chills  EYES/ENT: No visual changes;  No vertigo or throat pain   NECK: No pain or stiffness  RESPIRATORY: No cough, wheezing, hemoptysis; No shortness of breath  CARDIOVASCULAR: No chest pain or palpitations  GASTROINTESTINAL: No abdominal or epigastric pain. No nausea, vomiting, or hematemesis; No diarrhea or constipation. No melena or hematochezia.  GENITOURINARY: No dysuria, frequency or hematuria  NEUROLOGICAL: No numbness or weakness  SKIN: No itching, rashes REVIEW OF SYSTEMS:    CONSTITUTIONAL: + weakness no fevers or chills  EYES/ENT: No visual changes;  No vertigo or throat pain   NECK: No pain or stiffness  RESPIRATORY: No cough, wheezing, hemoptysis; No shortness of breath  CARDIOVASCULAR: No chest pain or palpitations  GASTROINTESTINAL: +abdominal pain +nausea no vomiting +melena   GENITOURINARY: No dysuria  NEUROLOGICAL: +numbness no weakness  SKIN: No itching, rashes

## 2019-10-07 NOTE — ED ADULT NURSE REASSESSMENT NOTE - NS ED NURSE REASSESS COMMENT FT1
1330 Pt straight cath for urine using sterile technique. 250cc output clear urine. UA and culture collected. Pt is cath at home daily every 4-6 hrs. Additional pading applied to sacral area to prevent skin breakdown.

## 2019-10-07 NOTE — H&P ADULT - ATTENDING COMMENTS
68F with extensive medical hx including aortic dissection (s/p repair ), spinal cord hematoma, chronic spasticity and pain, functional paraplegia , HTN, nephrolithiasis s/p left urethral stent, recurrent UTIs,  endotheliitis/keratitis (post-corneal transplant), recurrent UGIB (s/p multiple endoscopies) presenting with black tarry stools. Pt is hemodynamically stable. Labs notable for stable h/h, elevated bun. UA suspicious for UTI but pt is asymptomatic.   Assessment : recurrent UGIB ?etiology , Asymptomatic bacteruria  Plan: GI eval for possible EGD, monitor cbc q 8, pain control, hold labetalol in light of GIB.

## 2019-10-07 NOTE — ED PROVIDER NOTE - PHYSICAL EXAMINATION
Gaudencio SCHRADER MD PGY2:   PHYSICAL EXAM:    GENERAL: Weak appearing, pale.   HEENT:  Atraumatic, Normocephalic  CHEST/LUNG: Chest rise equal bilaterally  HEART: Regular rate and rhythm  ABDOMEN: Soft, Nontender, Nondistended;  ISHAN: Dark melanotic stool actively spilling out of rectum into diaper.   EXTREMITIES:  2+ Peripheral Pulses.  PSYCH: A&Ox3  SKIN: No obvious rashes or lesions

## 2019-10-07 NOTE — ED ADULT NURSE NOTE - OBJECTIVE STATEMENT
68 y.o female arrived via EMS from home c c/o rectal bleeding. As per EMS chronic GI bleed c unknown source. Upon husbands arrival pt has had approx. 10 endoscopies / colonoscopies. Upon exam dark tarry stool present in diaper c foul odor. Pt received approx. 250 cc by EMS. hypotensive in the 70 systolic range. Pt alert/ pale/ A&Ox4. additional IV acces obtained for labs and access upon arrival. 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 No vomiting at this time. Denies abd pain. No CP/SOB. Pt c/o thirst. Pts diaper and linen changed upon arrival- redness noted to sacral area. +contractures of lower extremities noted. Resident as bedside upon pts arrival. 68 y.o female arrived via EMS from home c c/o rectal bleeding. As per EMS chronic GI bleed c unknown source. Upon husbands arrival pt has had approx. 10 endoscopies / colonoscopies. Upon exam dark tarry stool present in diaper c foul odor. Pt received approx. 250 cc by EMS. hypotensive in the 70 systolic range. Pt alert/ pale/ A&Ox4. additional IV acces obtained for labs and access upon arrival. 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 No vomiting at this time. Denies abd pain. No CP/SOB. Pt c/o thirst. Pts diaper and linen changed upon arrival- redness noted to sacral area. +contractures of lower extremities noted. Baclofen pump present under skin R abd area. Resident as bedside upon pts arrival.

## 2019-10-07 NOTE — ED PROVIDER NOTE - ATTENDING CONTRIBUTION TO CARE
Attending MD Hollis:  I personally have seen and examined this patient.  Resident note reviewed and agree on plan of care and except where noted.

## 2019-10-07 NOTE — ED ADULT NURSE REASSESSMENT NOTE - NS ED NURSE REASSESS COMMENT FT1
Pt had a total of 3 diaper changes while in ER, upon arrival- melana  2nd diaper- quarter size amount of melana  3rd diaper 1615- diaper full of melana- VSS.

## 2019-10-07 NOTE — CONSULT NOTE ADULT - SUBJECTIVE AND OBJECTIVE BOX
Patient is a 68y old  Female who presents with a chief complaint of Melena (07 Oct 2019 14:57)      HPI:  Ms. Portillo is a 68 yr old female w hxf aortic dissection (post-repair several years prior), spinal cord hematoma (now functionally paraplegic), chronic spasticity+pain (on Oxycodone and Baclofen pump), HTN, nephrolithiasis s/p left urethral stent, UTIs and endotheliitis/keratitis (post-corneal transplant) presenting with black tarry stools.  at bedside reports patient had a black bowel movement this AM. Patient typically displays a certain prodrome of symptoms (paleness, abdominal pain, nausea, weakness, and tachycardia). Has had multiple recent hospitalizations with multiple EGDs in an attempt to find an identifying source. In early September s/p EGDs x 4 with no identifiable source of active bleeding but requiring > 10U of pRBCs. Suspicion was [place] but unable to act at [facility], recommendation was possible ex-lap which the PCP was not in favor of and patient/ agreed. Later that month had a repeat EGD showed gastritis but no acute bleeding. Enteroscopy was negative. Pillcam study a few days later showed active bleeding with clot in the distal stomach, emergent EGD a few days later for blood bowel movement overnight was negative. Required 2U of pRBCs for blood loss with repeat EGD negative. Recently hospitalized on 10/1 due to concern for possible GIB with EGD negative and discharged on 10/4. Denies NSAID use. No recent ASA use.     In the ED:  Vitals: 97.5F  /68 HR 97 RR 18 SpO2 96% on RA  Labs: H/H 7.8/24.8, BUN 25, Calcium 8.2 with Albumin 2.6 Lactate 1.9 pCO2 54 pO2 23 UA WBC 49, +leuk esterase and many bacteria  Imaging: CXR negative for infection   Interventions: Became hypotensive 79/50 responded well to 2L of NS x 1, Acetaminophen 1g x 1 and 40 IVP of Protonix x 1 (07 Oct 2019 14:57)          PAST MEDICAL & SURGICAL HISTORY:  Subdural hematoma, nontraumatic: spontaneous  Dorsalgia of lumbar region: on pain medication /baclofen po and pump  Self-catheterizes urinary bladder  Anemia: chronic  Uveitis  Osteoporosis  PAD (peripheral artery disease)  Hematoma: spinal  September  treated  2018  Paraplegia: on wheelchair goes to physical therapy 2 x weekly  Aortic dissection, thoracic: Type A Repaired   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2018  UTI (urinary tract infection): stable x 3 months  TIA (transient ischemic attack)  HTN (Hypertension): on meds  History of corneal transplant: left corneal transplant on 2018  Disorder of spine: unthetethering 2 x  Presence of IVC filter:  ?  S/P aortic dissection repair: Type A Dissection repair /   descending aortic aneurysm repair 2016  H/O Spinal surgery: laminectomies       Allergies    No Known Allergies    Intolerances        MEDICATIONS  (STANDING):  artificial tears (preservative free) Ophthalmic Solution 1 Drop(s) Both EYES two times a day  ascorbic acid 500 milliGRAM(s) Oral daily  atorvastatin 40 milliGRAM(s) Oral at bedtime  baclofen 20 milliGRAM(s) Oral two times a day  DULoxetine 30 milliGRAM(s) Oral two times a day  gabapentin 800 milliGRAM(s) Oral three times a day  multivitamin 1 Tablet(s) Oral daily  pantoprazole  Injectable 40 milliGRAM(s) IV Push two times a day  prednisoLONE acetate 1% Suspension 1 Drop(s) Left EYE every 6 hours  sodium chloride 2% Ophthalmic Solution 1 Drop(s) Left EYE every 6 hours  valACYclovir 1000 milliGRAM(s) Oral three times a day    MEDICATIONS  (PRN):  acetaminophen   Tablet .. 650 milliGRAM(s) Oral every 6 hours PRN Mild Pain (1 - 3)  oxyCODONE    IR 10 milliGRAM(s) Oral every 6 hours PRN Moderate Pain (4-6) Severe Pain( 7-10)      Review of Systems:  denies abdominal pain.   No NVFC.    Vital Signs Last 24 Hrs  T(C): 37.1 (07 Oct 2019 14:45), Max: 37.1 (07 Oct 2019 14:45)  T(F): 98.7 (07 Oct 2019 14:45), Max: 98.7 (07 Oct 2019 14:45)  HR: 89 (07 Oct 2019 16:12) (79 - 99)  BP: 177/55 (07 Oct 2019 16:12) (79/50 - 177/55)  BP(mean): --  RR: 12 (07 Oct 2019 14:45) (12 - 18)  SpO2: 99% (07 Oct 2019 14:45) (96% - 99%)    PHYSICAL EXAM:  Constitutional: NAD, well-developed  Neck: No LAD, supple  Respiratory: CTA and P  Cardiovascular: S1 and S2, RRR, no M  Gastrointestinal: BS+, soft, NT/ND, neg HSM,    LABS:                        7.8    9.34  )-----------( 347      ( 07 Oct 2019 11:46 )             24.8     10-07    139  |  105  |  25<H>  ----------------------------<  128<H>  4.9   |  27  |  0.48<L>    Ca    8.2<L>      07 Oct 2019 11:46    TPro  4.7<L>  /  Alb  2.6<L>  /  TBili  0.2  /  DBili  x   /  AST  14  /  ALT  8<L>  /  AlkPhos  78  10-07    PT/INR - ( 07 Oct 2019 11:46 )   PT: 12.3 sec;   INR: 1.08 ratio         PTT - ( 07 Oct 2019 11:46 )  PTT:31.9 sec  Urinalysis Basic - ( 07 Oct 2019 14:12 )    Color: Light Yellow / Appearance: Clear / S.014 / pH: x  Gluc: x / Ketone: Negative  / Bili: Negative / Urobili: Negative   Blood: x / Protein: Trace / Nitrite: Negative   Leuk Esterase: Large / RBC: 3 /hpf / WBC 49 /HPF   Sq Epi: x / Non Sq Epi: 0 /hpf / Bacteria: Many      LIVER FUNCTIONS - ( 07 Oct 2019 11:46 )  Alb: 2.6 g/dL / Pro: 4.7 g/dL / ALK PHOS: 78 U/L / ALT: 8 U/L / AST: 14 U/L / GGT: x             RADIOLOGY & ADDITIONAL TESTS:

## 2019-10-07 NOTE — H&P ADULT - PROBLEM SELECTOR PLAN 1
This AM patient had black stools and also typically displays certain symptoms (pale, tachycardic, abdominal pain, nausea) that the  says usually hints at she might be bleeding. As per PCP patient was recommended to come to the ER. In the past couple months patient has had ~10 endoscopies. No colonoscopy. And also had a pill enteroscopy. Previous 2 hospitalizations required pRBC transfusions. BUN elevated with melena concern for UGIB.  - Spoke with Dr. Ambrocio plan for urgent EGD today, currently hemodynamically stable  - Will await further results from EGD and recommendations as per GI  - PPI 40 IV BID   - Currently NPO for EGD   - Type and Screen active  - Transfuse with Hgb < 7; currently H/H 7.8 baseline appears to be 8.5-10

## 2019-10-07 NOTE — H&P ADULT - PROBLEM SELECTOR PLAN 5
Uses baclofen pump and spinal stimulator which as per  has not been helpful.  - Hypotensive earlier held off on pain medication  - Tylenol IV helped with generalized pain, can give Oxycodone 10mg Q4H PRN for moderate to severe pain   - ISTOP in the chart Uses baclofen pump and spinal stimulator which as per  has not been helpful.  - Hypotensive earlier held off on pain medication  - Tylenol IV helped with generalized pain, can give Oxycodone 10mg Q4H PRN for moderate to severe pain   - Continue Duloxetine 30mg BID  - Continue Gabapentin 800mg TID  - Continue Baclofen 20mg BID   - ISTOP in the chart

## 2019-10-07 NOTE — H&P ADULT - PROBLEM SELECTOR PLAN 4
On Labetalol 200mg BID.   - Currently in the setting of GIB and earlier hypotension will hold off any anti-hypertensive therapy

## 2019-10-07 NOTE — ED ADULT NURSE REASSESSMENT NOTE - NS ED NURSE REASSESS COMMENT FT1
Pt admitted. No PRBC being ordered at this time. BP stable.  remains at bedside. Will continue to monitor.

## 2019-10-08 NOTE — PROGRESS NOTE ADULT - PROBLEM SELECTOR PLAN 2
Worsening anemia likely in the setting of UGIB.  - Plan as detailed above  - Trend CBCs Q8H for now Worsening anemia likely in the setting of UGIB.  - Plan as detailed above  - Monitor CBC q8h

## 2019-10-08 NOTE — PROGRESS NOTE ADULT - PROBLEM SELECTOR PLAN 3
Due to paraplegia urine is catheterized. Has a left ureteral stent, hx of nephrolithiasis. UA with +leuk esterase, many bacteria and WBC 49. Previous urine culture grew ESBL Klebsiella on 10/1 not treated but previous hx of ESBL Klebsiella 2/2 sepsis was treated with Meropenem. Currently asymptomatic, possibly 2/2 to colonization.  - Urine culture is pending, if patient is symptomatic, develops fevers or is hemodynamically unstable will treat with Meropenem (sensitive in the past) Due to paraplegia, self-catheterizes. Has a left ureteral stent, hx of nephrolithiasis. Previous urine culture grew ESBL Klebsiella on 10/1 not treated but previous hx of ESBL Klebsiella 2/2 sepsis was treated with Meropenem.  - UA with +leuk esterase, many bacteria and WBC 49.   - if symptomatic, develops fevers or is hemodynamically unstable will treat with Meropenem (sensitive in the past)  - F/u UCx

## 2019-10-08 NOTE — DIETITIAN INITIAL EVALUATION ADULT. - PHYSICAL APPEARANCE
other (specify)/underweight Ht: 5'7" Wt: 114.1 pounds BMI: 17.9 kg/m2 IBW: 135 pounds (+/-10%) 84.5%IBW  Edema per nursing flowsheets: 1+ generalized  Skin per nursing flowsheets: Stage 2 Pressure injury on coccyx

## 2019-10-08 NOTE — DIETITIAN INITIAL EVALUATION ADULT. - PROBLEM SELECTOR PLAN 5
Uses baclofen pump and spinal stimulator which as per  has not been helpful.  - Hypotensive earlier held off on pain medication  - Tylenol IV helped with generalized pain, can give Oxycodone 10mg Q4H PRN for moderate to severe pain   - Continue Duloxetine 30mg BID  - Continue Gabapentin 800mg TID  - Continue Baclofen 20mg BID   - ISTOP in the chart

## 2019-10-08 NOTE — DIETITIAN INITIAL EVALUATION ADULT. - PROBLEM SELECTOR PLAN 3
Due to paraplegia urine is catheterized. Has a left ureteral stent, hx of nephrolithiasis. UA with +leuk esterase, many bacteria and WBC 49. Previous urine culture grew ESBL Klebsiella on 10/1 not treated but previous hx of ESBL Klebsiella 2/2 sepsis was treated with Meropenem. Currently asymptomatic, possibly 2/2 to colonization.  - Urine culture is pending, if patient is symptomatic, develops fevers or is hemodynamically unstable will treat with Meropenem (sensitive in the past)

## 2019-10-08 NOTE — CHART NOTE - NSCHARTNOTEFT_GEN_A_CORE
Risks of video capsule endoscopy explained, including risk of retained capsule, potentially requiring surgery for removal.  Patient understands and agrees to risks.    Capsule ID: [id number]    Capsule swallowed at: 9:40 am    Keep NPO for now    Clear liquid diet after 2 hours at: 11:40    Resume regular diet at: 1:40 pm    GI fellow will retrieve equipment in the morning.

## 2019-10-08 NOTE — DIETITIAN INITIAL EVALUATION ADULT. - SIGNS/SYMPTOMS
as evidenced by severe muscle wasting, mild fat loss, and pt meeting <75% est needs for >1 mo. as evidenced by BMI 17.9 kg/m2

## 2019-10-08 NOTE — CHART NOTE - NSCHARTNOTEFT_GEN_A_CORE
Upon Nutritional Assessment by the Registered Dietitian your patient was determined to meet criteria / has evidence of the following diagnosis/diagnoses:          [ ]  Mild Protein Calorie Malnutrition        [ ]  Moderate Protein Calorie Malnutrition        [x ] Severe Protein Calorie Malnutrition        [ ] Unspecified Protein Calorie Malnutrition        [x ] Underweight / BMI <19        [ ] Morbid Obesity / BMI > 40      Findings as based on:  [x ] Comprehensive nutrition assessment   [x ] Nutrition Focused Physical Exam  [ x] Other:       Nutrition Plan/Recommendations:    1. As medically feasible/tolerated advance diet to Regular to promote PO intake.   2. Pt declined Nutrition supplements for when diet advanced, will monitor PO intake.   3. Continue Multivitamin and Vitamin C supplementation to aid wound healing.   4. Promote PO intake and nutrient dense meals/snacks.   5. Monitor PO intake/tolerance, weights, bowels, labs, and skin integrity.         PROVIDER Section:     By signing this assessment you are acknowledging and agree with the diagnosis/diagnoses assigned by the Registered Dietitian    Comments: Upon Nutritional Assessment by the Registered Dietitian your patient was determined to meet criteria / has evidence of the following diagnosis/diagnoses:          [ ]  Mild Protein Calorie Malnutrition        [ ]  Moderate Protein Calorie Malnutrition        [x ] Severe Protein Calorie Malnutrition        [ ] Unspecified Protein Calorie Malnutrition        [x ] Underweight / BMI <19        [ ] Morbid Obesity / BMI > 40      Findings as based on:  [x ] Comprehensive nutrition assessment   [x ] Nutrition Focused Physical Exam  [ x] Other: pt meeting <75% est needs for >1 mo. , Nutrition focused physical exam conducted with verbal consent from patient, findings reveal: severe muscle wasting of temples, clavicles, shoulders and mild fat loss noted in buccal region, BMI 17.9 kg/m2.       Nutrition Plan/Recommendations:    1. As medically feasible/tolerated advance diet to Low Fiber to promote PO intake.   2. Pt declined Nutrition supplements for when diet advanced, will monitor PO intake.   3. Continue Multivitamin and Vitamin C supplementation to aid wound healing.   4. Promote PO intake and nutrient dense meals/snacks.   5. Monitor PO intake/tolerance, weights, bowels, labs, and skin integrity.         PROVIDER Section:     By signing this assessment you are acknowledging and agree with the diagnosis/diagnoses assigned by the Registered Dietitian    Comments:

## 2019-10-08 NOTE — PROGRESS NOTE ADULT - PROBLEM SELECTOR PLAN 5
Uses baclofen pump and spinal stimulator which as per  has not been helpful.  - Hypotensive earlier held off on pain medication  - Tylenol IV helped with generalized pain, can give Oxycodone 10mg Q4H PRN for moderate to severe pain   - Continue Duloxetine 30mg BID  - Continue Gabapentin 800mg TID  - Continue Baclofen 20mg BID   - ISTOP in the chart Uses baclofen pump and spinal stimulator, per  has not been helpful.  - Hypotensive earlier held off on pain medication  - Oxycodone 10mg q4h PRN for moderate to severe pain   - Continue Duloxetine 30mg BID  - Continue Gabapentin 800mg TID  - Continue Baclofen 20mg BID   - ISTOP in the chart Uses baclofen pump and spinal stimulator, per  has not been helpful.  -follow up opt providers on when to refill next  - Hypotensive earlier held off on pain medication  - Oxycodone 10mg q4h PRN for moderate to severe pain   - Continue Duloxetine 30mg BID  - Continue Gabapentin 800mg TID  - Continue Baclofen 20mg BID   - ISTOP in the chart

## 2019-10-08 NOTE — PROGRESS NOTE ADULT - PROBLEM SELECTOR PLAN 4
On Labetalol 200mg BID.   - Currently in the setting of GIB and earlier hypotension will hold off any anti-hypertensive therapy On Labetalol 200mg BID.   - Currently in the setting of GIB and earlier hypotension will hold anti-hypertensives

## 2019-10-08 NOTE — CONSULT NOTE ADULT - SUBJECTIVE AND OBJECTIVE BOX
[facility] Ophthalmology Consult Note    HPI:  The pt is a 68 yr old female w hxf aortic dissection, spinal cord hematoma (now functionally paraplegic), chronic spasticity, HTN, nephrolithiasis s/p left urethral stent. The pt was recently admitted for management of multiple medical problems inlcuding melena and is s/p EGDs x 4 with no identifiable source of active bleeding. The pt also has a history of corneal transplant in the left eye, and known history of transplant rejection. The pt has been followed by the ophthalmology service for management of this known history. Ophthalmology are consulted for complaint of left eye pain, which the pt states has been chronic.     PMH: as above   Meds: Atorvastatin, 1 g Valacyclovir daily, PPI, Duloxetine, Gabapentin, Baclofen,   POcHx (including surgeries/lasers/trauma):  as above   Drops: Pred Acetate QID OS , divina gtts TID OS  FamHx: None  Social Hx: None  Allergies: NKDA    ROS:  General (neg), Vision (per HPI), Head and Neck (neg),     Mood and Affect Appropriate ( x ),  Oriented to Time, Place, and Person x 3 ( x )    Ophthalmology Exam:  Visual acuity (cc): 20/25 OD , HM only OS   Pupils: PERRL OD, OS surgical, no RAPD by reverse  Ttono: 14 OD, 16 OS   Extraocular movements (EOMs): grossly full     Slit lamp Exam (SLE)  External: Flat OU  Lids/Lashes/Lacrimal Ducts: Flat OU, some mucoid discharge OS  Sclera/Conjunctiva: W+Q OD, trace injection OS  Cornea: Cl OD,  OS: PK with stitches buried- no loose stitches; pt has 2 x 1.5 mm epithelial defect inferiorly, [place] lines, diffuse K edema OS. 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 Pt's exam findings were discussed with patient's outpt ophthalmologist, Dr. Blank who is aware that graft is failing. Will change regimen to the following:  - decrease Prednisolone acetate 1% drops to BID in left eye  - discontinue divina gtts TID OS  - add Bacitracin ointment QID OS   - add Ofloxacin QID OS   - change valtrex 1g BID if medically permissible   - findings and plan discussed with Dr. Blank and primary team    Outpatient follow-up: Patient should follow-up with his/her ophthalmologist or with [facility] Department of Ophthalmology- Cornea within 1 week of after discharge at:    [address]  [place], [place] [address] [phone] [facility] Ophthalmology Consult Note    HPI:  The pt is a 68 yr old female w hxf aortic dissection, spinal cord hematoma (now functionally paraplegic), chronic spasticity, HTN, nephrolithiasis s/p left urethral stent. The pt was recently admitted for management of multiple medical problems inlcuding melena and is s/p EGDs x 4 with no identifiable source of active bleeding. The pt also has a history of corneal transplant in the left eye, and known history of transplant rejection. The pt has been followed by the ophthalmology service for management of this known history. Ophthalmology are consulted for complaint of left eye pain, which the pt states has been chronic.  No change in vision, no new redness.    PMH: as above   Meds: Atorvastatin, 1 g Valacyclovir daily, PPI, Duloxetine, Gabapentin, Baclofen,   POcHx (including surgeries/lasers/trauma):  as above   Drops: Pred Acetate QID OS , divina gtts TID OS  FamHx: None  Social Hx: None  Allergies: NKDA    ROS:  General (neg), Vision (per HPI), Head and Neck (neg),     Mood and Affect Appropriate ( x ),  Oriented to Time, Place, and Person x 3 ( x )    Ophthalmology Exam:  Visual acuity (cc): 20/25 OD , HM only OS   Pupils: PERRL OD, OS surgical, no RAPD by reverse  Ttono: 14 OD, 16 OS   Extraocular movements (EOMs): grossly full     Slit lamp Exam (SLE)  External: Flat OU  Lids/Lashes/Lacrimal Ducts: Flat OU, some mucoid discharge OS  Sclera/Conjunctiva: W+Q OD, trace injection OS  Cornea: Cl OD,  OS: PK with stitches buried- no loose stitches; pt has 2 x 1.5 mm epithelial defect central inferiorly, diffuse K edema OS  Anterior Chamber: D+Q OU, no cell/flare OU  Iris: Flat OU  Lens: 2+ NS OD, dense cataract OS      Assessment and Plan    68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 Pt's exam findings were discussed with patient's outpt ophthalmologist, Dr. Blank who is aware that graft is failing. Will change regimen to the following:  - decrease Prednisolone acetate 1% drops to BID in left eye  - discontinue divina gtts TID OS  - add Bacitracin ointment QID OS   - add Ofloxacin QID OS   - change valtrex 1g BID if medically permissible   - findings and plan discussed with Dr. Blank and primary team and patient  - will follow    Outpatient follow-up: Patient should follow-up with his/her ophthalmologist or with [facility] Department of Ophthalmology- Cornea within 1 week of after discharge at:    [address]  [place] [address]  [phone]

## 2019-10-08 NOTE — PROGRESS NOTE ADULT - ASSESSMENT
68F h/o aortic dissection (post-repair several years prior), spinal cord hematoma (now functionally paraplegic), chronic spasticity + pain (on Oxycodone and Baclofen pump), HTN, nephrolithiasis s/p left urethral stent, UTIs and endotheliitis/keratitis (post-corneal transplant) presenting with black tarry stools. No source of bleeding identified on EGD. 68F h/o aortic dissection (post-repair several years prior), spinal cord hematoma (now functionally paraplegic), chronic spasticity + pain (on Oxycodone and Baclofen pump), HTN, nephrolithiasis s/p left urethral stent, UTIs and endotheliitis/keratitis (post-corneal transplant) admitted for melena. No source of bleeding identified on EGD.

## 2019-10-08 NOTE — PROGRESS NOTE ADULT - PROBLEM SELECTOR PLAN 1
This AM patient had black stools and also typically displays certain symptoms (pale, tachycardic, abdominal pain, nausea) that the  says usually hints at she might be bleeding. As per PCP patient was recommended to come to the ER. In the past couple months patient has had ~10 endoscopies. No colonoscopy. And also had a pill enteroscopy. Previous 2 hospitalizations required pRBC transfusions. BUN elevated with melena concern for UGIB.  - Spoke with Dr. Ambrocio plan for urgent EGD today, currently hemodynamically stable  - Will await further results from EGD and recommendations as per GI  - PPI 40 IV BID   - Currently NPO for EGD   - Type and Screen active  - Transfuse with Hgb < 7; currently H/H 7.8 baseline appears to be 8.5-10 Black stools and symptoms of anemia. Has had ~10 endoscopies and pill endoscopy over past few months but no colonoscopy. Previous 2 hospitalizations required pRBC transfusions. BUN elevated with melena concern for UGIB.  - Urgent EGD did not identify a source for bleeding  - NPO for pill endoscopy today, 2 hrs later adv to clears, 2 hrs later to regular diet  - PPI 40 IV BID   - Maintain active type and screen  - Transfuse with Hgb < 7  - GI following, appreciate recs Black stools and symptoms of anemia. Has had ~10 endoscopies and pill endoscopy over past few months but no colonoscopy. Previous 2 hospitalizations required pRBC transfusions.  - BUN/SCr ratio 46  - Urgent EGD did not identify a source for bleeding  - NPO for pill endoscopy today, 2 hrs later adv to clears, 2 hrs later to regular diet  - PPI 40 IV BID   - Maintain active type and screen  - Transfuse with Hgb < 7  - GI Dr. Ambrocio following, appreciate recs acute blood loss anemia concern for UGI bleed  Black stools and symptoms of anemia. Has had ~10 endoscopies and pill endoscopy over past few months but no colonoscopy. Previous 2 hospitalizations required pRBC transfusions.  - BUN/SCr ratio 46  - Urgent EGD did not identify a source for bleeding  - NPO for pill endoscopy today, 2 hrs later adv to clears, 2 hrs later to regular diet  - PPI 40 IV BID   - Maintain active type and screen  - Transfuse with Hgb < 7  - GI Dr. Arteaga following, appreciate recs

## 2019-10-08 NOTE — DIETITIAN INITIAL EVALUATION ADULT. - ETIOLOGY
related to inadequate PO intake 2/2 increased nutrient needs in setting of wound healing with altered GI function and frequent hospitalizations

## 2019-10-08 NOTE — DIETITIAN INITIAL EVALUATION ADULT. - PERTINENT LABORATORY DATA
10-08 Na141 mmol/L Glu 87 mg/dL K+ 4.6 mmol/L Cr  0.52 mg/dL BUN 24 mg/dL<H> Phos n/a   Alb n/a   PAB n/a

## 2019-10-08 NOTE — DIETITIAN INITIAL EVALUATION ADULT. - OTHER INFO
68 yr old female w hxf aortic dissection (post-repair several years prior), spinal cord hematoma (now functionally paraplegic), chronic spasticity+pain (on Oxycodone and Baclofen pump), HTN, nephrolithiasis s/p left urethral stent, UTIs and endotheliitis/keratitis (post-corneal transplant) presenting with black tarry stools.    Pt notes poor PO intake in-house 2/2 clear liquid and NPO diet. She notes she eats very well at home, 3 meals/day - diet recall resembles well balanced diet. Pt notes UBW is ~110 pounds, admission weight (10/7) was 114.1 pounds. Pt notes no recent weight changes. Pt denies N+V, diarrhea/constipation; no difficulties chewing/swallowing. NKFA. Admits to taking Vitamin C and Vitamin B12 supplements PTA. Nutrition focused physical exam conducted with verbal consent from patient, findings reveal: severe muscle wasting of temples, clavicles, shoulders and mild fat loss noted in buccal region.    Discussed importance of additional protein intake to facilitate wound healing. Pt declined nutrition supplement for when diet advanced to regular.

## 2019-10-08 NOTE — DIETITIAN INITIAL EVALUATION ADULT. - DIET TYPE
PO diet As medically feasible/tolerated, advance diet to regular As medically feasible/tolerated, advance diet to low fiber

## 2019-10-08 NOTE — PROGRESS NOTE ADULT - SUBJECTIVE AND OBJECTIVE BOX
Patient is a 68y old Female admitted for Melena (07 Oct 2019 18:30)    SUBJECTIVE / OVERNIGHT EVENTS:  Received 1u pRBC at 05:48 this AM.     REVIEW OF SYSTEMS:    CONSTITUTIONAL: No weakness, fevers or chills  EYES/ENT: No visual changes;  No vertigo or throat pain   NECK: No pain or stiffness  RESPIRATORY: No cough, wheezing, hemoptysis; No shortness of breath  CARDIOVASCULAR: No chest pain or palpitations  GASTROINTESTINAL: No abdominal or epigastric pain. No nausea, vomiting, or hematemesis; No diarrhea or constipation. No melena or hematochezia.  GENITOURINARY: No dysuria, frequency or hematuria  NEUROLOGICAL: No numbness or weakness  SKIN: No itching, burning, rashes, or lesions   All other review of systems is negative unless indicated above.    MEDICATIONS  (STANDING):  artificial tears (preservative free) Ophthalmic Solution 1 Drop(s) Both EYES two times a day  ascorbic acid 500 milliGRAM(s) Oral daily  atorvastatin 40 milliGRAM(s) Oral at bedtime  baclofen 20 milliGRAM(s) Oral two times a day  DULoxetine 30 milliGRAM(s) Oral two times a day  gabapentin 800 milliGRAM(s) Oral three times a day  multivitamin 1 Tablet(s) Oral daily  pantoprazole  Injectable 40 milliGRAM(s) IV Push two times a day  prednisoLONE acetate 1% Suspension 1 Drop(s) Left EYE every 6 hours  sodium chloride 2% Ophthalmic Solution 1 Drop(s) Left EYE every 6 hours  valACYclovir 1000 milliGRAM(s) Oral three times a day    MEDICATIONS  (PRN):  acetaminophen   Tablet .. 650 milliGRAM(s) Oral every 6 hours PRN Mild Pain (1 - 3)  oxyCODONE    IR 10 milliGRAM(s) Oral every 6 hours PRN Moderate Pain (4-6) Severe Pain( 7-10)  zolpidem 5 milliGRAM(s) Oral at bedtime PRN Insomnia      Allergies    No Known Allergies    Intolerances        OBJECTIVE:    Vital Signs Last 24 Hrs  T(C): 36.8 (08 Oct 2019 11:00), Max: 37.1 (07 Oct 2019 14:45)  T(F): 98.2 (08 Oct 2019 11:00), Max: 98.7 (07 Oct 2019 14:45)  HR: 79 (08 Oct 2019 11:00) (79 - 99)  BP: 118/62 (08 Oct 2019 11:00) (96/54 - 177/55)  BP(mean): --  RR: 18 (08 Oct 2019 11:00) (12 - 18)  SpO2: 98% (08 Oct 2019 11:00) (94% - 99%)  CAPILLARY BLOOD GLUCOSE        I&O's Summary    07 Oct 2019 07:01  -  08 Oct 2019 07:00  --------------------------------------------------------  IN: 1110 mL / OUT: 0 mL / NET: 1110 mL          PHYSICAL EXAM:  GENERAL: No acute distress, well-developed  HEAD: atraumatic, normocephalic  EYES: EOMI, PERRLA, conjunctiva and sclera clear  NECK: Supple, no lymphadenopathy, no JVD  CHEST/LUNG: CTAB; No wheezing, rales, or rhonchi  HEART: RRR; Normal S1/S2. No murmurs, rubs, or gallops  ABDOMEN: Soft, non-tender, non-distended; normal bowel sounds, no organomegaly  EXTREMITIES:  2+ peripheral pulses b/l, No clubbing, cyanosis, or edema  NEUROLOGY: A&O x3, no focal deficits  SKIN: Warm, dry, intact; No rashes or lesions      LABS:                        8.1    7.75  )-----------( 326      ( 08 Oct 2019 04:52 )             25.0     10-08    141  |  108  |  24<H>  ----------------------------<  87  4.6   |  25  |  0.52    Ca    8.2<L>      08 Oct 2019 04:52    TPro  4.7<L>  /  Alb  2.6<L>  /  TBili  0.2  /  DBili  x   /  AST  14  /  ALT  8<L>  /  AlkPhos  78  10-07    LIVER FUNCTIONS - ( 07 Oct 2019 11:46 )  Alb: 2.6 g/dL / Pro: 4.7 g/dL / ALK PHOS: 78 U/L / ALT: 8 U/L / AST: 14 U/L / GGT: x           PT/INR - ( 07 Oct 2019 11:46 )   PT: 12.3 sec;   INR: 1.08 ratio         PTT - ( 07 Oct 2019 11:46 )  PTT:31.9 sec      Urinalysis Basic - ( 07 Oct 2019 14:12 )    Color: Light Yellow / Appearance: Clear / S.014 / pH: x  Gluc: x / Ketone: Negative  / Bili: Negative / Urobili: Negative   Blood: x / Protein: Trace / Nitrite: Negative   Leuk Esterase: Large / RBC: 3 /hpf / WBC 49 /HPF   Sq Epi: x / Non Sq Epi: 0 /hpf / Bacteria: Many              RADIOLOGY & ADDITIONAL TESTS:    Imaging Personally Reviewed:     Consultant(s) Notes Reviewed:     Care Discussed with Consultants/Other Providers: Patient is a 68y old Female admitted for Melena (07 Oct 2019 18:30)    SUBJECTIVE / OVERNIGHT EVENTS:  Received 1u pRBC at 05:48 this AM. Pt is unsure if she had any bloody bowel movements since last night. Reports L eye pain that has been bothering her. Denies chest pain, dyspnea, cough, and abdominal pain.    REVIEW OF SYSTEMS:    CONSTITUTIONAL: No weakness, fevers or chills  EYES/ENT: No visual changes;  No vertigo or throat pain   NECK: No pain or stiffness  RESPIRATORY: No cough, wheezing, hemoptysis; No shortness of breath  CARDIOVASCULAR: No chest pain or palpitations  GASTROINTESTINAL: +Unsure of any hematochezia/melena overnight. No abdominal or epigastric pain. No nausea, vomiting, or hematemesis; No diarrhea or constipation.  GENITOURINARY: No dysuria, frequency or hematuria  NEUROLOGICAL: No numbness or weakness  SKIN: No itching, burning, rashes, or lesions   All other review of systems is negative unless indicated above.    MEDICATIONS  (STANDING):  artificial tears (preservative free) Ophthalmic Solution 1 Drop(s) Both EYES two times a day  ascorbic acid 500 milliGRAM(s) Oral daily  atorvastatin 40 milliGRAM(s) Oral at bedtime  baclofen 20 milliGRAM(s) Oral two times a day  DULoxetine 30 milliGRAM(s) Oral two times a day  gabapentin 800 milliGRAM(s) Oral three times a day  multivitamin 1 Tablet(s) Oral daily  pantoprazole  Injectable 40 milliGRAM(s) IV Push two times a day  prednisoLONE acetate 1% Suspension 1 Drop(s) Left EYE every 6 hours  sodium chloride 2% Ophthalmic Solution 1 Drop(s) Left EYE every 6 hours  valACYclovir 1000 milliGRAM(s) Oral three times a day    MEDICATIONS  (PRN):  acetaminophen   Tablet .. 650 milliGRAM(s) Oral every 6 hours PRN Mild Pain (1 - 3)  oxyCODONE    IR 10 milliGRAM(s) Oral every 6 hours PRN Moderate Pain (4-6) Severe Pain( 7-10)  zolpidem 5 milliGRAM(s) Oral at bedtime PRN Insomnia      Allergies  No Known Allergies    OBJECTIVE:    Vital Signs Last 24 Hrs  T(C): 36.8 (08 Oct 2019 11:00), Max: 37.1 (07 Oct 2019 14:45)  T(F): 98.2 (08 Oct 2019 11:00), Max: 98.7 (07 Oct 2019 14:45)  HR: 79 (08 Oct 2019 11:00) (79 - 99)  BP: 118/62 (08 Oct 2019 11:00) (96/54 - 177/55)  BP(mean): --  RR: 18 (08 Oct 2019 11:00) (12 - 18)  SpO2: 98% (08 Oct 2019 11:00) (94% - 99%)  CAPILLARY BLOOD GLUCOSE        I&O's Summary    07 Oct 2019 07:01  -  08 Oct 2019 07:00  --------------------------------------------------------  IN: 1110 mL / OUT: 0 mL / NET: 1110 mL      PHYSICAL EXAM:  GENERAL: No acute distress, well-developed  HEAD: atraumatic, normocephalic  EYES: R eye normal conjunctiva and sclera. No erythema of L eye.  NECK: Supple, no lymphadenopathy, no JVD  CHEST/LUNG: CTAB; No wheezing, rales, or rhonchi  HEART: RRR; Normal S1/S2. No murmurs, rubs, or gallops  ABDOMEN: Soft, non-tender, non-distended; normal bowel sounds x4, no organomegaly  EXTREMITIES:  2+ peripheral pulses b/l, No clubbing, cyanosis, or edema  NEUROLOGY: A&O x3, no focal deficits  SKIN: Warm, dry, intact; No rashes or lesions      LABS:                        8.1    7.75  )-----------( 326      ( 08 Oct 2019 04:52 )             25.0     10    141  |  108  |  24<H>  ----------------------------<  87  4.6   |  25  |  0.52    Ca    8.2<L>      08 Oct 2019 04:52    TPro  4.7<L>  /  Alb  2.6<L>  /  TBili  0.2  /  DBili  x   /  AST  14  /  ALT  8<L>  /  AlkPhos  78  1007    LIVER FUNCTIONS - ( 07 Oct 2019 11:46 )  Alb: 2.6 g/dL / Pro: 4.7 g/dL / ALK PHOS: 78 U/L / ALT: 8 U/L / AST: 14 U/L / GGT: x           PT/INR - ( 07 Oct 2019 11:46 )   PT: 12.3 sec;   INR: 1.08 ratio         PTT - ( 07 Oct 2019 11:46 )  PTT:31.9 sec      Urinalysis Basic - ( 07 Oct 2019 14:12 )    Color: Light Yellow / Appearance: Clear / S.014 / pH: x  Gluc: x / Ketone: Negative  / Bili: Negative / Urobili: Negative   Blood: x / Protein: Trace / Nitrite: Negative   Leuk Esterase: Large / RBC: 3 /hpf / WBC 49 /HPF   Sq Epi: x / Non Sq Epi: 0 /hpf / Bacteria: Many      RADIOLOGY & ADDITIONAL TESTS:    Imaging Personally Reviewed:     Consultant(s) Notes Reviewed:     Care Discussed with Consultants/Other Providers:

## 2019-10-08 NOTE — PROGRESS NOTE ADULT - PROBLEM SELECTOR PLAN 7
DVT PPx: IMPROVE = 2 in the setting of GIB AC contraindicated SCDs  Diet: NPO for pill endoscopy DVT PPx: IMPROVE = 2 in the setting of GIB AC contraindicated SCDs  Diet: Regular    Leonidas Arechiga MD  Internal Medicine PGY-1  [phone] / [id number] DVT ppx: IMPROVE 2, AC contraindicated given GIB, SCDs  Diet: Regular    Leonidas Arechiga MD  Internal Medicine PGY-1  [phone] / [phone]

## 2019-10-08 NOTE — DIETITIAN INITIAL EVALUATION ADULT. - ADD RECOMMEND
1. As medically feasible/tolerated advance diet to Regular to promote PO intake. 2. Pt declined Nutrition supplements for when diet advanced, will monitor PO intake. 3. Continue Multivitamin and Vitamin C supplementation to aid wound healing. 4. Promote PO intake and nutrient dense meals/snacks. 5. Monitor PO intake/tolerance, weights, bowels, labs, and skin integrity. 6. New Malnutrition alert placed - will follow up per protocol. 1. As medically feasible/tolerated advance diet to Low Fiber to promote PO intake. 2. Pt declined Nutrition supplements for when diet advanced, will monitor PO intake. 3. Continue Multivitamin and Vitamin C supplementation to aid wound healing. 4. Promote PO intake and nutrient dense meals/snacks. 5. Monitor PO intake/tolerance, weights, bowels, labs, and skin integrity. 6. New Malnutrition alert placed - will follow up per protocol.

## 2019-10-08 NOTE — DIETITIAN INITIAL EVALUATION ADULT. - NUTRITIONGOAL OUTCOME2
As medically feasible/tolerated diet will be advanced to regular diet. Continue Multivitamin +Vit C As medically feasible/tolerated diet will be advanced to low fiber diet. Continue Multivitamin+Vit C

## 2019-10-08 NOTE — PROGRESS NOTE ADULT - PROBLEM SELECTOR PLAN 6
L eye discomfort  - Continue Valacyclovir 1000mg TID L eye discomfort  - Continue Valacyclovir 1000mg TID  - c/w prednisolone 1% drops q6h and NaCl 2% drops q6h  - Ophthalmology consulted, will f/u recs

## 2019-10-09 NOTE — PROGRESS NOTE ADULT - PROBLEM SELECTOR PLAN 1
acute blood loss anemia concern for UGI bleed  Black stools and symptoms of anemia. Has had ~10 endoscopies and pill endoscopy over past few months but no colonoscopy. Previous 2 hospitalizations required pRBC transfusions.  - BUN/SCr ratio 46  - Urgent EGD did not identify a source for bleeding  - NPO for pill endoscopy today, 2 hrs later adv to clears, 2 hrs later to regular diet  - PPI 40 IV BID   - Maintain active type and screen  - Transfuse with Hgb < 7  - GI Dr. Arteaga following, appreciate recs Acute blood loss anemia concern for UGI bleed  - Black stools and symptoms of anemia. Has had ~10 endoscopies and pill endoscopy over past few months but no colonoscopy. Previous 2 hospitalizations required pRBC transfusions.  - BUN/SCr ratio >30  - Urgent EGD did not identify a source for bleeding  - F/u results of pill endoscopy  - PPI 40 IV BID   - Maintain active type and screen  - Transfuse with Hgb < 7  - GI Dr. rAteaga following, appreciate recs Acute blood loss anemia concern for UGI bleed  - Black stools and symptoms of anemia. Has had ~10 endoscopies and pill endoscopy over past few months but no colonoscopy. Previous 2 hospitalizations required pRBC transfusions.  - BUN/SCr ratio >30  - Urgent EGD did not identify a source for bleeding  - F/u results of pill endoscopy  - PPI 40 IV BID  - CBC q8h   - Maintain active type and screen, transfuse with Hgb < 7  - GI Dr. Arteaga following, appreciate recs

## 2019-10-09 NOTE — PROGRESS NOTE ADULT - SUBJECTIVE AND OBJECTIVE BOX
Patient is a 68y old Female admitted for Melena (08 Oct 2019 22:00)      SUBJECTIVE / OVERNIGHT EVENTS:    REVIEW OF SYSTEMS:    CONSTITUTIONAL: No weakness, fevers or chills  EYES/ENT: No visual changes;  No vertigo or throat pain   NECK: No pain or stiffness  RESPIRATORY: No cough, wheezing, hemoptysis; No shortness of breath  CARDIOVASCULAR: No chest pain or palpitations  GASTROINTESTINAL: No abdominal or epigastric pain. No nausea, vomiting, or hematemesis; No diarrhea or constipation. No melena or hematochezia.  GENITOURINARY: No dysuria, frequency or hematuria  NEUROLOGICAL: No numbness or weakness  SKIN: No itching, burning, rashes, or lesions   All other review of systems is negative unless indicated above.    MEDICATIONS  (STANDING):  artificial tears (preservative free) Ophthalmic Solution 1 Drop(s) Both EYES two times a day  ascorbic acid 500 milliGRAM(s) Oral daily  atorvastatin 40 milliGRAM(s) Oral at bedtime  baclofen 20 milliGRAM(s) Oral two times a day  DULoxetine 30 milliGRAM(s) Oral two times a day  gabapentin 800 milliGRAM(s) Oral three times a day  multivitamin 1 Tablet(s) Oral daily  pantoprazole  Injectable 40 milliGRAM(s) IV Push two times a day  prednisoLONE acetate 1% Suspension 1 Drop(s) Left EYE every 6 hours  sodium chloride 2% Ophthalmic Solution 1 Drop(s) Left EYE every 6 hours  valACYclovir 1000 milliGRAM(s) Oral three times a day    MEDICATIONS  (PRN):  acetaminophen   Tablet .. 650 milliGRAM(s) Oral every 6 hours PRN Mild Pain (1 - 3)  oxyCODONE    IR 10 milliGRAM(s) Oral every 6 hours PRN Moderate Pain (4-6) Severe Pain( 7-10)  zolpidem 5 milliGRAM(s) Oral at bedtime PRN Insomnia      Allergies    No Known Allergies    Intolerances        OBJECTIVE:    Vital Signs Last 24 Hrs  T(C): 36.8 (09 Oct 2019 04:39), Max: 36.9 (08 Oct 2019 18:21)  T(F): 98.3 (09 Oct 2019 04:39), Max: 98.4 (08 Oct 2019 18:21)  HR: 83 (09 Oct 2019 04:39) (79 - 88)  BP: 149/69 (09 Oct 2019 04:39) (116/67 - 149/69)  BP(mean): --  RR: 18 (09 Oct 2019 04:39) (18 - 18)  SpO2: 98% (09 Oct 2019 04:39) (96% - 99%)  CAPILLARY BLOOD GLUCOSE        I&O's Summary    08 Oct 2019 07:01  -  09 Oct 2019 07:00  --------------------------------------------------------  IN: 300 mL / OUT: 0 mL / NET: 300 mL          PHYSICAL EXAM:  GENERAL: No acute distress, well-developed  HEAD: atraumatic, normocephalic  EYES: EOMI, PERRLA, conjunctiva and sclera clear  NECK: Supple, no lymphadenopathy, no JVD  CHEST/LUNG: CTAB; No wheezing, rales, or rhonchi  HEART: RRR; Normal S1/S2. No murmurs, rubs, or gallops  ABDOMEN: Soft, non-tender, non-distended; normal bowel sounds, no organomegaly  EXTREMITIES:  2+ peripheral pulses b/l, No clubbing, cyanosis, or edema  NEUROLOGY: A&O x3, no focal deficits  SKIN: Warm, dry, intact; No rashes or lesions      LABS:                        8.4    9.54  )-----------( 348      ( 08 Oct 2019 23:49 )             26.5     10-08    141  |  108  |  24<H>  ----------------------------<  87  4.6   |  25  |  0.52    Ca    8.2<L>      08 Oct 2019 04:52    TPro  4.7<L>  /  Alb  2.6<L>  /  TBili  0.2  /  DBili  x   /  AST  14  /  ALT  8<L>  /  AlkPhos  78  10-07    LIVER FUNCTIONS - ( 07 Oct 2019 11:46 )  Alb: 2.6 g/dL / Pro: 4.7 g/dL / ALK PHOS: 78 U/L / ALT: 8 U/L / AST: 14 U/L / GGT: x           PT/INR - ( 07 Oct 2019 11:46 )   PT: 12.3 sec;   INR: 1.08 ratio         PTT - ( 07 Oct 2019 11:46 )  PTT:31.9 sec      Urinalysis Basic - ( 07 Oct 2019 14:12 )    Color: Light Yellow / Appearance: Clear / S.014 / pH: x  Gluc: x / Ketone: Negative  / Bili: Negative / Urobili: Negative   Blood: x / Protein: Trace / Nitrite: Negative   Leuk Esterase: Large / RBC: 3 /hpf / WBC 49 /HPF   Sq Epi: x / Non Sq Epi: 0 /hpf / Bacteria: Many      Culture - Urine (collected 07 Oct 2019 17:05)  Source: .Urine  Preliminary Report (08 Oct 2019 19:54):    >100,000 CFU/ml Gram Negative Rods      RADIOLOGY & ADDITIONAL TESTS:    Imaging Personally Reviewed:     Consultant(s) Notes Reviewed:     Care Discussed with Consultants/Other Providers: Patient is a 68y old Female admitted for Melena (08 Oct 2019 22:00)    SUBJECTIVE / OVERNIGHT EVENTS:  Post-transfusion Hgb 8.4 (pre-transfusion 6.8). Had acute onset of severe L-sided chest pain yesterday in the afternoon, found to be laying on the  for the pill endoscopy. She has not had a bowel movement since yesterday. Reports chronic back pain due to spinal cord injury that is unchanged from her usual pain. Her baclofen pump was refilled on 19 in the MICU and she is due for the next refill by 19. Currently denies chest pain, dyspnea, abdominal pain, hematochezia, and melena.    REVIEW OF SYSTEMS:  CONSTITUTIONAL: No weakness, fevers or chills  EYES/ENT: No visual changes;  No vertigo or throat pain   NECK: No pain or stiffness  RESPIRATORY: No cough, wheezing, hemoptysis; No shortness of breath  CARDIOVASCULAR: No chest pain or palpitations  GASTROINTESTINAL: No abdominal or epigastric pain. No nausea, vomiting, or hematemesis; No diarrhea or constipation. No melena or hematochezia.  GENITOURINARY: No dysuria, frequency or hematuria  NEUROLOGICAL: No numbness or weakness  SKIN: No itching, burning, rashes, or lesions   All other review of systems is negative unless indicated above.    MEDICATIONS  (STANDING):  artificial tears (preservative free) Ophthalmic Solution 1 Drop(s) Both EYES two times a day  ascorbic acid 500 milliGRAM(s) Oral daily  atorvastatin 40 milliGRAM(s) Oral at bedtime  baclofen 20 milliGRAM(s) Oral two times a day  DULoxetine 30 milliGRAM(s) Oral two times a day  gabapentin 800 milliGRAM(s) Oral three times a day  multivitamin 1 Tablet(s) Oral daily  pantoprazole  Injectable 40 milliGRAM(s) IV Push two times a day  prednisoLONE acetate 1% Suspension 1 Drop(s) Left EYE every 6 hours  sodium chloride 2% Ophthalmic Solution 1 Drop(s) Left EYE every 6 hours  valACYclovir 1000 milliGRAM(s) Oral three times a day    MEDICATIONS  (PRN):  acetaminophen   Tablet .. 650 milliGRAM(s) Oral every 6 hours PRN Mild Pain (1 - 3)  oxyCODONE    IR 10 milliGRAM(s) Oral every 6 hours PRN Moderate Pain (4-6) Severe Pain( 7-10)  zolpidem 5 milliGRAM(s) Oral at bedtime PRN Insomnia      Allergies  No Known Allergies    OBJECTIVE:    Vital Signs Last 24 Hrs  T(C): 36.8 (09 Oct 2019 04:39), Max: 36.9 (08 Oct 2019 18:21)  T(F): 98.3 (09 Oct 2019 04:39), Max: 98.4 (08 Oct 2019 18:21)  HR: 83 (09 Oct 2019 04:39) (79 - 88)  BP: 149/69 (09 Oct 2019 04:39) (116/67 - 149/69)  BP(mean): --  RR: 18 (09 Oct 2019 04:39) (18 - 18)  SpO2: 98% (09 Oct 2019 04:39) (96% - 99%)  CAPILLARY BLOOD GLUCOSE      I&O's Summary    08 Oct 2019 07:01  -  09 Oct 2019 07:00  --------------------------------------------------------  IN: 300 mL / OUT: 0 mL / NET: 300 mL      PHYSICAL EXAM:  GENERAL: No acute distress, well-developed  HEAD: atraumatic, normocephalic  EYES: Conjunctiva and sclera clear  NECK: Supple, no lymphadenopathy, no JVD  CHEST/LUNG: CTAB; No wheezing, rales, or rhonchi  HEART: RRR; Normal S1/S2. No murmurs, rubs, or gallops  ABDOMEN: Soft, non-tender, non-distended; normal bowel sounds, no organomegaly  EXTREMITIES:  2+ peripheral pulses b/l, No clubbing, cyanosis, or edema  NEUROLOGY: A&O x3, no focal deficits  SKIN: Warm, dry, intact; No rashes or lesions      LABS:                        8.4    9.54  )-----------( 348      ( 08 Oct 2019 23:49 )             26.5     10    141  |  108  |  24<H>  ----------------------------<  87  4.6   |  25  |  0.52    Ca    8.2<L>      08 Oct 2019 04:52    TPro  4.7<L>  /  Alb  2.6<L>  /  TBili  0.2  /  DBili  x   /  AST  14  /  ALT  8<L>  /  AlkPhos  78  10-07    LIVER FUNCTIONS - ( 07 Oct 2019 11:46 )  Alb: 2.6 g/dL / Pro: 4.7 g/dL / ALK PHOS: 78 U/L / ALT: 8 U/L / AST: 14 U/L / GGT: x           PT/INR - ( 07 Oct 2019 11:46 )   PT: 12.3 sec;   INR: 1.08 ratio         PTT - ( 07 Oct 2019 11:46 )  PTT:31.9 sec      Urinalysis Basic - ( 07 Oct 2019 14:12 )    Color: Light Yellow / Appearance: Clear / S.014 / pH: x  Gluc: x / Ketone: Negative  / Bili: Negative / Urobili: Negative   Blood: x / Protein: Trace / Nitrite: Negative   Leuk Esterase: Large / RBC: 3 /hpf / WBC 49 /HPF   Sq Epi: x / Non Sq Epi: 0 /hpf / Bacteria: Many      Culture - Urine (collected 07 Oct 2019 17:05)  Source: .Urine  Preliminary Report (08 Oct 2019 19:54):    >100,000 CFU/ml Gram Negative Rods      RADIOLOGY & ADDITIONAL TESTS:    Imaging Personally Reviewed:     Consultant(s) Notes Reviewed:     Care Discussed with Consultants/Other Providers:

## 2019-10-09 NOTE — PROGRESS NOTE ADULT - PROBLEM SELECTOR PLAN 4
On Labetalol 200mg BID.   - Currently in the setting of GIB and earlier hypotension will hold anti-hypertensives

## 2019-10-09 NOTE — PROGRESS NOTE ADULT - PROBLEM SELECTOR PLAN 7
DVT ppx: IMPROVE 2, AC contraindicated given GIB, SCDs  Diet: Regular    Leonidas Arechiga MD  Internal Medicine PGY-1  [phone] / [phone]

## 2019-10-09 NOTE — PROGRESS NOTE ADULT - SUBJECTIVE AND OBJECTIVE BOX
[facility] Ophthalmology Follow Up Note    Interval:     Mood and Affect Appropriate ( x ),  Oriented to Time, Place, and Person x 3 ( x )    Ophthalmology Exam:  Visual acuity (cc): 20/25 OD , HM only OS   Pupils: PERRL OD, OS surgical, no RAPD by reverse  Ttono: 15 OU   Extraocular movements (EOMs): grossly full     Slit lamp Exam (SLE)  External: Flat OU  Lids/Lashes/Lacrimal Ducts: Flat OU, some mucoid discharge OS  Sclera/Conjunctiva: W+Q OD, trace injection OS  Cornea: Cl OD,  OS: PK with stitches buried- no loose stitches; pt has 2 x 1.5 mm epithelial defect inferiorly, Khodadoust lines, diffuse K edema OS. 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 Pt's exam findings were discussed with patient's outpt ophthalmologist, Dr. Blank who is aware that graft is failing. Will change regimen to the following:  - decrease Prednisolone acetate 1% drops to BID in left eye  - discontinue divina gtts TID OS  - add Bacitracin ointment QID OS   - add Ofloxacin QID OS   - change valtrex 1g BID if medically permissible   - findings and plan discussed with Dr. Blank and primary team    Outpatient follow-up: Patient should follow-up with his/her ophthalmologist or with [facility] Department of Ophthalmology- [place] within 1 week of after discharge at:    [address]  [place], [place] [id number] [phone] [facility] Ophthalmology Follow Up Note    Interval:     Mood and Affect Appropriate ( x ),  Oriented to Time, Place, and Person x 3 ( x )    Ophthalmology Exam:  Visual acuity (cc): 20/25 OD , HM only OS   Pupils: PERRL OD, OS surgical, no RAPD by reverse  Ttono: 15 OU   Extraocular movements (EOMs): grossly full     Slit lamp Exam (SLE)  External: Flat OU  Lids/Lashes/Lacrimal Ducts: Flat OU, some mucoid discharge OS  Sclera/Conjunctiva: W+Q OD, trace injection OS  Cornea: Cl OD,  OS: PK with stitches buried- no loose stitches; pt has 2 x 1.5 mm epithelial defect inferiorly, no evidence of Khodadoust lines, Pt does have diffuse K edema OS with scattered uptake of fluorescein   Anterior Chamber: D+Q OU, no cell/flare   Iris: Flat OU  Lens: 2+ NS OD, dense cataract OS      Assessment and Plan    68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 Pt's exam findings were discussed with patient's outpt ophthalmologist, Dr. Blank who is aware that graft is failing. Will change regimen to the following:  - decrease Prednisolone acetate 1% drops to BID in left eye  - discontinue divina gtts TID OS  - add Bacitracin ointment QID OS   - add Ofloxacin QID OS   - change valtrex 1g BID if medically permissible   - findings and plan discussed with Dr. Blank and primary team    Outpatient follow-up: Patient should follow-up with his/her ophthalmologist or with [facility] Department of Ophthalmology- [place] within 1 week of after discharge at:    [address]  [place] [address]  [phone]

## 2019-10-09 NOTE — PROGRESS NOTE ADULT - PROBLEM SELECTOR PLAN 6
L eye discomfort  - Continue Valacyclovir 1000mg TID  - c/w prednisolone 1% drops q6h and NaCl 2% drops q6h  - Ophthalmology consulted, will f/u recs L eye discomfort  - Changing Valacyclovir 1000mg from TID to BID  - Prednisolone 1% drops BID L eye  - Bacitracin ointment QID L eye  - Ofloxacin QID L eye  - Stopping NaCl 2% drops  - Ophtho following, appreciate recs

## 2019-10-09 NOTE — PROGRESS NOTE ADULT - SUBJECTIVE AND OBJECTIVE BOX
Patient was seen and examined.      PillCam study reviewed.  No evidence of bleeding.  No evidence of AVMs, mass or ulcer.    Therefore, continue to monitor for signs of bleeding.  IF recurrent bleeding CT c/a/p as per vascular surgery and repeat endoscopy.    Continue to monitor as inpatient.    Thank you.    Donell Ambrocio MD

## 2019-10-09 NOTE — CONSULT NOTE ADULT - ATTENDING COMMENTS
I have interviewed and examined the patient and reviewed the residents note including the history, exam, assessment, and plan.  I agree with the residents assessment and plan.    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 Pt's exam findings were discussed with patient's outpt ophthalmologist, Dr. Blank who is aware that graft is failing. Will change regimen to the following:  - decrease Prednisolone acetate 1% drops to BID in left eye  - discontinue divina gtts TID OS  - add Bacitracin ointment QID OS   - add Ofloxacin QID OS   - change valtrex 1g BID if medically permissible   - findings and plan discussed with Dr. Blank and primary team and patient  - will follow    Shireen Joshi MD
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  In 2016 she had repair of her descending thorcic and thoracoabdominal aorta, as well as an aortomesenteric bypass.  Her suture line is suprarenal.  This was done retroperitoneal, according to prior op report.    This pt has had numerous GI bleed with unclear source.  Given her prior aortic surgery as well as her chronic aortic dissection with aneurysmal degeneration, I was asked to eval this pt for aortoenteric fistula.  I reviewed her prior imaging, which does not show any periaortic fluid, loss of fat planes, or periaortic gas.  She would be unlikely to develop secondary AEF given the location of her suture line.    There are no findings on prior imaging to suggest primary AEF.  If she has another episode of GI bleed, would rec repeat CT angio c/a/p with delayed images, to evaluate whether contrast pools into the GI tract.

## 2019-10-09 NOTE — PROGRESS NOTE ADULT - ASSESSMENT
68F h/o aortic dissection (post-repair several years prior), spinal cord hematoma (now functionally paraplegic), chronic spasticity + pain (on Oxycodone and Baclofen pump), HTN, nephrolithiasis s/p left urethral stent, UTIs and endotheliitis/keratitis (post-corneal transplant) admitted for melena. No source of bleeding identified on EGD.

## 2019-10-09 NOTE — CONSULT NOTE ADULT - SUBJECTIVE AND OBJECTIVE BOX
[facility] Surgical Consultant Evaluation    HPI:  Ms. Portillo is a 68 yr old female w hxf aortic dissection (post-repair several years prior), spinal cord hematoma (now functionally paraplegic), chronic spasticity+pain (on Oxycodone and Baclofen pump), HTN, nephrolithiasis s/p left urethral stent, UTIs and endotheliitis/keratitis (post-corneal transplant) presenting with black tarry stools.  at bedside reports patient had a black bowel movement this AM. Patient typically displays a certain prodrome of symptoms (paleness, abdominal pain, nausea, weakness, and tachycardia). Has had multiple recent hospitalizations with multiple EGDs in an attempt to find an identifying source. In early September s/p EGDs x 4 with no identifiable source of active bleeding but requiring > 10U of pRBCs. Suspicion was Dieulafoy but unable to act at [facility], recommendation was possible ex-lap which the PCP was not in favor of and patient/ agreed. Later that month had a repeat EGD showed gastritis but no acute bleeding. Enteroscopy was negative. Pillcam study a few days later showed active bleeding with clot in the distal stomach, emergent EGD a few days later for blood bowel movement overnight was negative. Required 2U of pRBCs for blood loss with repeat EGD negative. Recently hospitalized on 10/1 due to concern for possible GIB with EGD negative and discharged on 10/4. Denies NSAID use. No recent ASA use.     Vascular Surgery consulted for concern for interval development of aortoenteric fistula in setting of GI bleed with prior thoracoabdominal repair.     In the ED:  Vitals: 97.5F  /68 HR 97 RR 18 SpO2 96% on RA  Labs: H/H 7.8/24.8, BUN 25, Calcium 8.2 with Albumin 2.6 Lactate 1.9 pCO2 54 pO2 23 UA WBC 49, +leuk esterase and many bacteria  Imaging: CXR negative for infection   Interventions: Became hypotensive 79/50 responded well to 2L of NS x 1, Acetaminophen 1g x 1 and 40 IVP of Protonix x 1 (07 Oct 2019 14:57)      PAST MEDICAL & SURGICAL HISTORY:  Subdural hematoma, nontraumatic: spontaneous  Dorsalgia of lumbar region: on pain medication /baclofen po and pump  Self-catheterizes urinary bladder  Anemia: chronic  Uveitis  Osteoporosis  PAD (peripheral artery disease)  Hematoma: spinal  September  treated  2018  Paraplegia: on wheelchair goes to physical therapy 2 x weekly  Aortic dissection, thoracic: Type A Repaired   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2018  UTI (urinary tract infection): stable x 3 months  TIA (transient ischemic attack)  HTN (Hypertension): on meds  History of corneal transplant: left corneal transplant on 2018  Disorder of spine: unthetethering 2 x  Presence of IVC filter:  ?  S/P aortic dissection repair: Type A Dissection repair /   descending aortic aneurysm repair 2016  H/O Spinal surgery: laminectomies       MEDICATIONS  (STANDING):  artificial tears (preservative free) Ophthalmic Solution 1 Drop(s) Both EYES two times a day  ascorbic acid 500 milliGRAM(s) Oral daily  atorvastatin 40 milliGRAM(s) Oral at bedtime  BACItracin   Ointment 1 Application(s) Topical four times a day  baclofen 20 milliGRAM(s) Oral two times a day  DULoxetine 30 milliGRAM(s) Oral two times a day  gabapentin 800 milliGRAM(s) Oral three times a day  multivitamin 1 Tablet(s) Oral daily  ofloxacin 0.3% Solution 1 Drop(s) Left EYE four times a day  pantoprazole  Injectable 40 milliGRAM(s) IV Push two times a day  prednisoLONE acetate 1% Suspension 1 Drop(s) Left EYE two times a day  valACYclovir 1000 milliGRAM(s) Oral three times a day      ___________________________________________  REVIEW OF SYSTEMS:    ___________________________________________  PHYSICAL EXAM:  Vital Signs Last 24 Hrs  T(C): 36.8 (09 Oct 2019 04:39), Max: 36.9 (08 Oct 2019 18:21)  T(F): 98.3 (09 Oct 2019 04:39), Max: 98.4 (08 Oct 2019 18:21)  HR: 83 (09 Oct 2019 04:39) (79 - 88)  BP: 149/69 (09 Oct 2019 04:39) (116/67 - 149/69)  BP(mean): --  RR: 18 (09 Oct 2019 04:39) (18 - 18)  SpO2: 98% (09 Oct 2019 04:39) (96% - 99%)CAPILLARY BLOOD GLUCOSE        I&O's Detail    08 Oct 2019 07:01  -  09 Oct 2019 07:00  --------------------------------------------------------  IN:    Packed Red Blood Cells: 300 mL  Total IN: 300 mL    OUT:  Total OUT: 0 mL    Total NET: 300 mL          General: Alert and Oriented x3, No acute distress.  Respiratory: Breathing non-labored.  Abdomen: Soft, nontender, nondistended. No rebound, no guarding, No palpable organomegaly or masses.  Extremities: Moves all four.   ____________________________________________  LABS:  CBC Full  -  ( 08 Oct 2019 23:49 )  WBC Count : 9.54 K/uL  RBC Count : 3.06 M/uL  Hemoglobin : 8.4 g/dL  Hematocrit : 26.5 %  Platelet Count - Automated : 348 K/uL  Mean Cell Volume : 86.6 fl  Mean Cell Hemoglobin : 27.5 pg  Mean Cell Hemoglobin Concentration : 31.7 gm/dL  Auto Neutrophil # : x  Auto Lymphocyte # : x  Auto Monocyte # : x  Auto Eosinophil # : x  Auto Basophil # : x  Auto Neutrophil % : x  Auto Lymphocyte % : x  Auto Monocyte % : x  Auto Eosinophil % : x  Auto Basophil % : x    10-08    141  |  108  |  24<H>  ----------------------------<  87  4.6   |  25  |  0.52    Ca    8.2<L>      08 Oct 2019 04:52    TPro  4.7<L>  /  Alb  2.6<L>  /  TBili  0.2  /  DBili  x   /  AST  14  /  ALT  8<L>  /  AlkPhos  78  10-07    LIVER FUNCTIONS - ( 07 Oct 2019 11:46 )  Alb: 2.6 g/dL / Pro: 4.7 g/dL / ALK PHOS: 78 U/L / ALT: 8 U/L / AST: 14 U/L / GGT: x           PT/INR - ( 07 Oct 2019 11:46 )   PT: 12.3 sec;   INR: 1.08 ratio         PTT - ( 07 Oct 2019 11:46 )  PTT:31.9 sec  Urinalysis Basic - ( 07 Oct 2019 14:12 )    Color: Light Yellow / Appearance: Clear / S.014 / pH: x  Gluc: x / Ketone: Negative  / Bili: Negative / Urobili: Negative   Blood: x / Protein: Trace / Nitrite: Negative   Leuk Esterase: Large / RBC: 3 /hpf / WBC 49 /HPF   Sq Epi: x / Non Sq Epi: 0 /hpf / Bacteria: Many          ____________________________________________  RADIOLOGY: [facility] Vascular Surgical Consultant Evaluation    HPI:  Ms. Portillo is a 68 yr old female w hxf aortic dissection (post-repair several years prior), spinal cord hematoma (now functionally paraplegic), chronic spasticity+pain (on Oxycodone and Baclofen pump), HTN, nephrolithiasis s/p left urethral stent, UTIs and endotheliitis/keratitis (post-corneal transplant) presenting with black tarry stools.  at bedside reports patient had a black bowel movement this AM. Patient typically displays a certain prodrome of symptoms (paleness, abdominal pain, nausea, weakness, and tachycardia). Has had multiple recent hospitalizations with multiple EGDs in an attempt to find an identifying source. In early September s/p EGDs x 4 with no identifiable source of active bleeding but requiring > 10U of pRBCs. Suspicion was Dieulafoy but unable to act at [facility], recommendation was possible ex-lap which the PCP was not in favor of and patient/ agreed. Later that month had a repeat EGD showed gastritis but no acute bleeding. Enteroscopy was negative. Pillcam study a few days later showed active bleeding with clot in the distal stomach, emergent EGD a few days later for blood bowel movement overnight was negative. Required 2U of pRBCs for blood loss with repeat EGD negative. Recently hospitalized on 10/1 due to concern for possible GIB with EGD negative and discharged on 10/4. Denies NSAID use. No recent ASA use.     Vascular Surgery consulted for concern for interval development of aortoenteric fistula in setting of significant recurrent GI bleed with prior thoracoabdominal replacement with aorto-iliac bypass in '16 by Dr. Barriga. During this hospital admission, patient has underwent EGD on 10/7 without findings of acute bleeding. Yesterday swallowed pill cam. No bleeding thus far this admission.     In the ED:  Vitals: 97.5F  /68 HR 97 RR 18 SpO2 96% on RA  Labs: H/H 7.8/24.8, BUN 25, Calcium 8.2 with Albumin 2.6 Lactate 1.9 pCO2 54 pO2 23 UA WBC 49, +leuk esterase and many bacteria  Imaging: CXR negative for infection   Interventions: Became hypotensive 79/50 responded well to 2L of NS x 1, Acetaminophen 1g x 1 and 40 IVP of Protonix x 1 (07 Oct 2019 14:57)      PAST MEDICAL & SURGICAL HISTORY:  Subdural hematoma, nontraumatic: spontaneous  Dorsalgia of lumbar region: on pain medication /baclofen po and pump  Self-catheterizes urinary bladder  Anemia: chronic  Uveitis  Osteoporosis  PAD (peripheral artery disease)  Hematoma: spinal  September  treated  2018  Paraplegia: on wheelchair goes to physical therapy 2 x weekly  Aortic dissection, thoracic: Type A Repaired   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2018  UTI (urinary tract infection): stable x 3 months  TIA (transient ischemic attack)  HTN (Hypertension): on meds  History of corneal transplant: left corneal transplant on 2018  Disorder of spine: unthetethering 2 x  Presence of IVC filter:  ?  S/P aortic dissection repair: Type A Dissection repair /   descending aortic aneurysm repair 2016  H/O Spinal surgery: laminectomies       MEDICATIONS  (STANDING):  artificial tears (preservative free) Ophthalmic Solution 1 Drop(s) Both EYES two times a day  ascorbic acid 500 milliGRAM(s) Oral daily  atorvastatin 40 milliGRAM(s) Oral at bedtime  BACItracin   Ointment 1 Application(s) Topical four times a day  baclofen 20 milliGRAM(s) Oral two times a day  DULoxetine 30 milliGRAM(s) Oral two times a day  gabapentin 800 milliGRAM(s) Oral three times a day  multivitamin 1 Tablet(s) Oral daily  ofloxacin 0.3% Solution 1 Drop(s) Left EYE four times a day  pantoprazole  Injectable 40 milliGRAM(s) IV Push two times a day  prednisoLONE acetate 1% Suspension 1 Drop(s) Left EYE two times a day  valACYclovir 1000 milliGRAM(s) Oral three times a day      ___________________________________________  REVIEW OF SYSTEMS:  As stated in History of Present Illness, otherwise non-contributory.   ___________________________________________  PHYSICAL EXAM:  Vital Signs Last 24 Hrs  T(C): 36.8 (09 Oct 2019 04:39), Max: 36.9 (08 Oct 2019 18:21)  T(F): 98.3 (09 Oct 2019 04:39), Max: 98.4 (08 Oct 2019 18:21)  HR: 83 (09 Oct 2019 04:39) (79 - 88)  BP: 149/69 (09 Oct 2019 04:39) (116/67 - 149/69)  BP(mean): --  RR: 18 (09 Oct 2019 04:39) (18 - 18)  SpO2: 98% (09 Oct 2019 04:39) (96% - 99%)CAPILLARY BLOOD GLUCOSE        I&O's Detail    08 Oct 2019 07:01  -  09 Oct 2019 07:00  --------------------------------------------------------  IN:    Packed Red Blood Cells: 300 mL  Total IN: 300 mL    OUT:  Total OUT: 0 mL    Total NET: 300 mL        General: AOx3, No Acute Distress.  Neuro: Alert and oriented, moves upper extremities spontaneously, lower extremity from waist down without movement. Sensation intact.   HEENT: Extraocular movements intact. Mucosa moist.   Respiratory: Airway patent, respirations unlabored.  Cardiovascular: Pulse present.   Abdomen: Soft, nontender, nondistended. Baclofen pump in place.   Genitourinary: No obvious lesions.  Vascular: Bilateral radial, femoral, popliteal, dorsalis pedis, pulses present.   Integumentary: No obvious lesions.   ____________________________________________  LABS:  CBC Full  -  ( 08 Oct 2019 23:49 )  WBC Count : 9.54 K/uL  RBC Count : 3.06 M/uL  Hemoglobin : 8.4 g/dL  Hematocrit : 26.5 %  Platelet Count - Automated : 348 K/uL  Mean Cell Volume : 86.6 fl  Mean Cell Hemoglobin : 27.5 pg  Mean Cell Hemoglobin Concentration : 31.7 gm/dL  Auto Neutrophil # : x  Auto Lymphocyte # : x  Auto Monocyte # : x  Auto Eosinophil # : x  Auto Basophil # : x  Auto Neutrophil % : x  Auto Lymphocyte % : x  Auto Monocyte % : x  Auto Eosinophil % : x  Auto Basophil % : x    10-08    141  |  108  |  24<H>  ----------------------------<  87  4.6   |  25  |  0.52    Ca    8.2<L>      08 Oct 2019 04:52    TPro  4.7<L>  /  Alb  2.6<L>  /  TBili  0.2  /  DBili  x   /  AST  14  /  ALT  8<L>  /  AlkPhos  78  10-07    LIVER FUNCTIONS - ( 07 Oct 2019 11:46 )  Alb: 2.6 g/dL / Pro: 4.7 g/dL / ALK PHOS: 78 U/L / ALT: 8 U/L / AST: 14 U/L / GGT: x           PT/INR - ( 07 Oct 2019 11:46 )   PT: 12.3 sec;   INR: 1.08 ratio         PTT - ( 07 Oct 2019 11:46 )  PTT:31.9 sec  Urinalysis Basic - ( 07 Oct 2019 14:12 )    Color: Light Yellow / Appearance: Clear / S.014 / pH: x  Gluc: x / Ketone: Negative  / Bili: Negative / Urobili: Negative   Blood: x / Protein: Trace / Nitrite: Negative   Leuk Esterase: Large / RBC: 3 /hpf / WBC 49 /HPF   Sq Epi: x / Non Sq Epi: 0 /hpf / Bacteria: Many          ____________________________________________  RADIOLOGY:  < from: Upper Endoscopy (19 @ 09:28) >  Findings:       The examined esophagus was normal except for blood. Theentire lumen was suctioned and        lavaged and no active bleeding from the esophagus.       There is no endoscopic evidence of stricture, ulcerations or varices in the entire esophagus.       Hematin (altered blood/coffee-ground-like material) wasfound in the cardia, in the gastric        fundus and in the gastric body. Large clots of blood in the fundus. Attempted to removed the        clots which precluded visualization via RothNet with some success. Ultimately changed        position to prone position. Re introduction of the scope, able to visualize the entire cardia        fundus. Lavaged the mucosa and careful inspection for over 30 minutes just in the fundic area        failed to reveal any varices, ulcer, Dieulafoy's lesion, or active bleeding. Two clips noted        along the greater curvature previously placed. No active bleeding from the site. Extrinsic        impression onto the anterior body from possible perigastric organ (/?liver). However, no        active bleeding at that site. The antrum with blood staining the mucosa. The area was lavaged        and again no pathology for the bleed       Hematin (altered blood/coffee-ground-like material) was found in the entire duodenum. Place a        clear cap at end of the scope and detailed evaluation of the bulb, 2nd, 3rd portion and        ampulla without active bleed. No obvious AVMs though mucosa was coated with blood. Extensive        lavage and removal of clots was undertaken and the area observed for at least 30 minutes        beyond active bleeding.                                                                                                        Impression:          - Normal esophagus.                       - Hematin (altered blood/coffee-ground-like material) in the cardia, in the                        gastric fundus and in the gastric body.                       - Blood in the entire examined duodenum.                       - No specimens collected.                       AFTER CLOSE TO 3 HOURS OF PERFORMING THE ENDOSCOPY, AN ACTIVE BLEEDING SITE                        WAS NOT FOUND. BLOOD DID INTERFERE WITH OPTIMAL VISUALIZATION. PRIOR 2                        ENDOSCOPIES HAVE BEE UNREMARKABLE. THEREFORE, ABORTED THE PROCEDURE.    < end of copied text > [facility] Vascular Surgical Consultant Evaluation    HPI:  Ms. Portillo is a 68 yr old female w hxf aortic dissection (post-repair several years prior), spinal cord hematoma (now functionally paraplegic), chronic spasticity+pain (on Oxycodone and Baclofen pump), HTN, nephrolithiasis s/p left urethral stent, UTIs and endotheliitis/keratitis (post-corneal transplant) presenting with black tarry stools.  at bedside reports patient had a black bowel movement this AM. Patient typically displays a certain prodrome of symptoms (paleness, abdominal pain, nausea, weakness, and tachycardia). Has had multiple recent hospitalizations with multiple EGDs in an attempt to find an identifying source. In early September s/p EGDs x 4 with no identifiable source of active bleeding but requiring > 10U of pRBCs. Suspicion was Dieulafoy but unable to act at [facility], recommendation was possible ex-lap which the PCP was not in favor of and patient/ agreed. Later that month had a repeat EGD showed gastritis but no acute bleeding. Enteroscopy was negative. Pillcam study a few days later showed active bleeding with clot in the distal stomach, emergent EGD a few days later for blood bowel movement overnight was negative. Required 2U of pRBCs for blood loss with repeat EGD negative. Recently hospitalized on 10/1 due to concern for possible GIB with EGD negative and discharged on 10/4. Denies NSAID use. No recent ASA use.     Vascular Surgery consulted for concern for interval development of aortoenteric fistula in setting of significant recurrent GI bleed with prior thoracoabdominal replacement with aorto-mesenteric bypass in '16 by Dr. Barriga. During this hospital admission, patient has underwent EGD on 10/7 without findings of acute bleeding. Yesterday swallowed pill cam. No bleeding thus far this admission.     In the ED:  Vitals: 97.5F  /68 HR 97 RR 18 SpO2 96% on RA  Labs: H/H 7.8/24.8, BUN 25, Calcium 8.2 with Albumin 2.6 Lactate 1.9 pCO2 54 pO2 23 UA WBC 49, +leuk esterase and many bacteria  Imaging: CXR negative for infection   Interventions: Became hypotensive 79/50 responded well to 2L of NS x 1, Acetaminophen 1g x 1 and 40 IVP of Protonix x 1 (07 Oct 2019 14:57)      PAST MEDICAL & SURGICAL HISTORY:  Subdural hematoma, nontraumatic: spontaneous  Dorsalgia of lumbar region: on pain medication /baclofen po and pump  Self-catheterizes urinary bladder  Anemia: chronic  Uveitis  Osteoporosis  PAD (peripheral artery disease)  Hematoma: spinal  September  treated  2018  Paraplegia: on wheelchair goes to physical therapy 2 x weekly  Aortic dissection, thoracic: Type A Repaired   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2018  UTI (urinary tract infection): stable x 3 months  TIA (transient ischemic attack)  HTN (Hypertension): on meds  History of corneal transplant: left corneal transplant on 2018  Disorder of spine: unthetethering 2 x  Presence of IVC filter:  ?  S/P aortic dissection repair: Type A Dissection repair /   descending aortic aneurysm repair 2016  H/O Spinal surgery: laminectomies       MEDICATIONS  (STANDING):  artificial tears (preservative free) Ophthalmic Solution 1 Drop(s) Both EYES two times a day  ascorbic acid 500 milliGRAM(s) Oral daily  atorvastatin 40 milliGRAM(s) Oral at bedtime  BACItracin   Ointment 1 Application(s) Topical four times a day  baclofen 20 milliGRAM(s) Oral two times a day  DULoxetine 30 milliGRAM(s) Oral two times a day  gabapentin 800 milliGRAM(s) Oral three times a day  multivitamin 1 Tablet(s) Oral daily  ofloxacin 0.3% Solution 1 Drop(s) Left EYE four times a day  pantoprazole  Injectable 40 milliGRAM(s) IV Push two times a day  prednisoLONE acetate 1% Suspension 1 Drop(s) Left EYE two times a day  valACYclovir 1000 milliGRAM(s) Oral three times a day      ___________________________________________  REVIEW OF SYSTEMS:  As stated in History of Present Illness, otherwise non-contributory.   ___________________________________________  PHYSICAL EXAM:  Vital Signs Last 24 Hrs  T(C): 36.8 (09 Oct 2019 04:39), Max: 36.9 (08 Oct 2019 18:21)  T(F): 98.3 (09 Oct 2019 04:39), Max: 98.4 (08 Oct 2019 18:21)  HR: 83 (09 Oct 2019 04:39) (79 - 88)  BP: 149/69 (09 Oct 2019 04:39) (116/67 - 149/69)  BP(mean): --  RR: 18 (09 Oct 2019 04:39) (18 - 18)  SpO2: 98% (09 Oct 2019 04:39) (96% - 99%)CAPILLARY BLOOD GLUCOSE        I&O's Detail    08 Oct 2019 07:01  -  09 Oct 2019 07:00  --------------------------------------------------------  IN:    Packed Red Blood Cells: 300 mL  Total IN: 300 mL    OUT:  Total OUT: 0 mL    Total NET: 300 mL        General: AOx3, No Acute Distress.  Neuro: Alert and oriented, moves upper extremities spontaneously, lower extremity from waist down without movement. Sensation intact.   HEENT: Extraocular movements intact. Mucosa moist.   Respiratory: Airway patent, respirations unlabored.  Cardiovascular: Pulse present.   Abdomen: Soft, nontender, nondistended. Baclofen pump in place.   Genitourinary: No obvious lesions.  Vascular: Bilateral radial, femoral, popliteal, dorsalis pedis, pulses present.   Integumentary: No obvious lesions.   ____________________________________________  LABS:  CBC Full  -  ( 08 Oct 2019 23:49 )  WBC Count : 9.54 K/uL  RBC Count : 3.06 M/uL  Hemoglobin : 8.4 g/dL  Hematocrit : 26.5 %  Platelet Count - Automated : 348 K/uL  Mean Cell Volume : 86.6 fl  Mean Cell Hemoglobin : 27.5 pg  Mean Cell Hemoglobin Concentration : 31.7 gm/dL  Auto Neutrophil # : x  Auto Lymphocyte # : x  Auto Monocyte # : x  Auto Eosinophil # : x  Auto Basophil # : x  Auto Neutrophil % : x  Auto Lymphocyte % : x  Auto Monocyte % : x  Auto Eosinophil % : x  Auto Basophil % : x    10-08    141  |  108  |  24<H>  ----------------------------<  87  4.6   |  25  |  0.52    Ca    8.2<L>      08 Oct 2019 04:52    TPro  4.7<L>  /  Alb  2.6<L>  /  TBili  0.2  /  DBili  x   /  AST  14  /  ALT  8<L>  /  AlkPhos  78  10-07    LIVER FUNCTIONS - ( 07 Oct 2019 11:46 )  Alb: 2.6 g/dL / Pro: 4.7 g/dL / ALK PHOS: 78 U/L / ALT: 8 U/L / AST: 14 U/L / GGT: x           PT/INR - ( 07 Oct 2019 11:46 )   PT: 12.3 sec;   INR: 1.08 ratio         PTT - ( 07 Oct 2019 11:46 )  PTT:31.9 sec  Urinalysis Basic - ( 07 Oct 2019 14:12 )    Color: Light Yellow / Appearance: Clear / S.014 / pH: x  Gluc: x / Ketone: Negative  / Bili: Negative / Urobili: Negative   Blood: x / Protein: Trace / Nitrite: Negative   Leuk Esterase: Large / RBC: 3 /hpf / WBC 49 /HPF   Sq Epi: x / Non Sq Epi: 0 /hpf / Bacteria: Many          ____________________________________________  RADIOLOGY:  < from: Upper Endoscopy (19 @ 09:28) >  Findings:       The examined esophagus was normal except for blood. Theentire lumen was suctioned and        lavaged and no active bleeding from the esophagus.       There is no endoscopic evidence of stricture, ulcerations or varices in the entire esophagus.       Hematin (altered blood/coffee-ground-like material) wasfound in the cardia, in the gastric        fundus and in the gastric body. Large clots of blood in the fundus. Attempted to removed the        clots which precluded visualization via RothNet with some success. Ultimately changed        position to prone position. Re introduction of the scope, able to visualize the entire cardia        fundus. Lavaged the mucosa and careful inspection for over 30 minutes just in the fundic area        failed to reveal any varices, ulcer, Dieulafoy's lesion, or active bleeding. Two clips noted        along the greater curvature previously placed. No active bleeding from the site. Extrinsic        impression onto the anterior body from possible perigastric organ (/?liver). However, no        active bleeding at that site. The antrum with blood staining the mucosa. The area was lavaged        and again no pathology for the bleed       Hematin (altered blood/coffee-ground-like material) was found in the entire duodenum. Place a        clear cap at end of the scope and detailed evaluation of the bulb, 2nd, 3rd portion and        ampulla without active bleed. No obvious AVMs though mucosa was coated with blood. Extensive        lavage and removal of clots was undertaken and the area observed for at least 30 minutes        beyond active bleeding.                                                                                                        Impression:          - Normal esophagus.                       - Hematin (altered blood/coffee-ground-like material) in the cardia, in the                        gastric fundus and in the gastric body.                       - Blood in the entire examined duodenum.                       - No specimens collected.                       AFTER CLOSE TO 3 HOURS OF PERFORMING THE ENDOSCOPY, AN ACTIVE BLEEDING SITE                        WAS NOT FOUND. BLOOD DID INTERFERE WITH OPTIMAL VISUALIZATION. PRIOR 2                        ENDOSCOPIES HAVE BEE UNREMARKABLE. THEREFORE, ABORTED THE PROCEDURE.    < end of copied text >

## 2019-10-09 NOTE — CONSULT NOTE ADULT - ASSESSMENT
Marce is a very pleasant 68 Year-Old Lady with history of aortic dissection s/p thoraco-abdominal aortic replacement with aorto-iliac bypass in 09/2016 with Dr. Barriga, spinal cord hematoma (now functionally paraplegic), chronic spasticity + pain (on Oxycodone and Baclofen pump), HTN, nephrolithiasis s/p left urethral stent, UTIs and endotheliitis/keratitis (post-corneal transplant) admitted for recurrent GIB requiring transfusion.     - No Acute Surgical intervention indicated at this time.   - Appreciate GI eval: EGD negative, currently undergoing capsule endoscopy.   - If patient rebleeds, would obtain STAT CT Angio Chest/Abdomen/Pelvis with additional delayed phase imaging to assess for possible aorto-enteric fistula as well as for collection inside GI tract.   - Discussed with Attending Surgeon.     Vascular Surgery Pager #[phone] Marce is a very pleasant 68 Year-Old Lady with history of aortic dissection s/p thoraco-abdominal aortic replacement with aorto-mesenteric bypass in 09/2016 with Dr. Barriga, spinal cord hematoma (now functionally paraplegic), chronic spasticity + pain (on Oxycodone and Baclofen pump), HTN, nephrolithiasis s/p left urethral stent, UTIs and endotheliitis/keratitis (post-corneal transplant) admitted for recurrent GIB requiring transfusion.     - No Acute Surgical intervention indicated at this time.   - Appreciate GI eval: EGD negative, currently undergoing capsule endoscopy.   - If patient rebleeds, would obtain STAT CT Angio Chest/Abdomen/Pelvis with additional delayed phase imaging to assess for possible aorto-enteric fistula as well as for collection inside GI tract.   - Discussed with Attending Surgeon.     Vascular Surgery Pager #[phone]

## 2019-10-09 NOTE — PROGRESS NOTE ADULT - PROBLEM SELECTOR PLAN 5
Uses baclofen pump and spinal stimulator, per  has not been helpful.  -follow up opt providers on when to refill next  - Hypotensive earlier held off on pain medication  - Oxycodone 10mg q4h PRN for moderate to severe pain   - Continue Duloxetine 30mg BID  - Continue Gabapentin 800mg TID  - Continue Baclofen 20mg BID   - ISTOP in the chart Uses baclofen pump and spinal stimulator, per  has not been helpful.  - Pump last refilled 9/18/19 in MICU, next refill due by 12/1/19  - Hypotensive earlier held off on pain medication  - Oxycodone 10mg q4h PRN for moderate to severe pain   - Continue Duloxetine 30mg BID  - Continue Gabapentin 800mg TID  - Continue Baclofen 20mg BID   - ISTOP in the chart

## 2019-10-09 NOTE — PROGRESS NOTE ADULT - PROBLEM SELECTOR PLAN 3
Due to paraplegia, self-catheterizes. Has a left ureteral stent, hx of nephrolithiasis. Previous urine culture grew ESBL Klebsiella on 10/1 not treated but previous hx of ESBL Klebsiella 2/2 sepsis was treated with Meropenem.  - UA with +leuk esterase, many bacteria and WBC 49.   - if symptomatic, develops fevers or is hemodynamically unstable will treat with Meropenem (sensitive in the past)  - F/u UCx Due to paraplegia, self-catheterizes. Has a left ureteral stent, hx of nephrolithiasis. Previous urine culture grew ESBL Klebsiella on 10/1 not treated but previous hx of ESBL Klebsiella 2/2 sepsis was treated with Meropenem.  - No dysuria  - UA with +leuk esterase, many bacteria and WBC 49.   - UCx growing >100,000 CFU/mL GNRs, will f/u speciation and sensitivities  - If symptomatic, develops fevers or is hemodynamically unstable will treat with Meropenem (sensitive in the past)

## 2019-10-10 NOTE — PROGRESS NOTE ADULT - SUBJECTIVE AND OBJECTIVE BOX
Patient is a 68y old Female admitted for Melena (09 Oct 2019 19:27)      SUBJECTIVE / OVERNIGHT EVENTS:    REVIEW OF SYSTEMS:    CONSTITUTIONAL: No weakness, fevers or chills  EYES/ENT: No visual changes;  No vertigo or throat pain   NECK: No pain or stiffness  RESPIRATORY: No cough, wheezing, hemoptysis; No shortness of breath  CARDIOVASCULAR: No chest pain or palpitations  GASTROINTESTINAL: No abdominal or epigastric pain. No nausea, vomiting, or hematemesis; No diarrhea or constipation. No melena or hematochezia.  GENITOURINARY: No dysuria, frequency or hematuria  NEUROLOGICAL: No numbness or weakness  SKIN: No itching, burning, rashes, or lesions   All other review of systems is negative unless indicated above.    MEDICATIONS  (STANDING):  artificial tears (preservative free) Ophthalmic Solution 1 Drop(s) Both EYES two times a day  ascorbic acid 500 milliGRAM(s) Oral daily  atorvastatin 40 milliGRAM(s) Oral at bedtime  BACItracin   Ointment 1 Application(s) Topical four times a day  baclofen 20 milliGRAM(s) Oral two times a day  DULoxetine 30 milliGRAM(s) Oral two times a day  gabapentin 800 milliGRAM(s) Oral three times a day  multivitamin 1 Tablet(s) Oral daily  ofloxacin 0.3% Solution 1 Drop(s) Left EYE four times a day  pantoprazole  Injectable 40 milliGRAM(s) IV Push two times a day  prednisoLONE acetate 1% Suspension 1 Drop(s) Left EYE two times a day  valACYclovir 1000 milliGRAM(s) Oral two times a day    MEDICATIONS  (PRN):  acetaminophen   Tablet .. 650 milliGRAM(s) Oral every 6 hours PRN Mild Pain (1 - 3)  oxyCODONE    IR 10 milliGRAM(s) Oral every 6 hours PRN Moderate Pain (4-6) Severe Pain( 7-10)  zolpidem 5 milliGRAM(s) Oral at bedtime PRN Insomnia      Allergies    No Known Allergies    Intolerances        OBJECTIVE:    Vital Signs Last 24 Hrs  T(C): 36.8 (10 Oct 2019 04:44), Max: 36.9 (09 Oct 2019 20:27)  T(F): 98.2 (10 Oct 2019 04:44), Max: 98.5 (09 Oct 2019 20:27)  HR: 78 (10 Oct 2019 04:44) (71 - 79)  BP: 173/79 (10 Oct 2019 04:44) (132/66 - 173/79)  BP(mean): --  RR: 18 (10 Oct 2019 04:44) (18 - 18)  SpO2: 98% (10 Oct 2019 04:44) (96% - 98%)  CAPILLARY BLOOD GLUCOSE        I&O's Summary    09 Oct 2019 07:01  -  10 Oct 2019 07:00  --------------------------------------------------------  IN: 0 mL / OUT: 1800 mL / NET: -1800 mL          PHYSICAL EXAM:  GENERAL: No acute distress, well-developed  HEAD: atraumatic, normocephalic  EYES: EOMI, PERRLA, conjunctiva and sclera clear  NECK: Supple, no lymphadenopathy, no JVD  CHEST/LUNG: CTAB; No wheezing, rales, or rhonchi  HEART: RRR; Normal S1/S2. No murmurs, rubs, or gallops  ABDOMEN: Soft, non-tender, non-distended; normal bowel sounds, no organomegaly  EXTREMITIES:  2+ peripheral pulses b/l, No clubbing, cyanosis, or edema  NEUROLOGY: A&O x3, no focal deficits  SKIN: Warm, dry, intact; No rashes or lesions      LABS:                        8.2    7.60  )-----------( 336      ( 09 Oct 2019 22:17 )             25.7     10-09    139  |  104  |  17  ----------------------------<  104<H>  3.8   |  25  |  0.46<L>    Ca    8.1<L>      09 Oct 2019 09:27  Phos  3.2     10-09  Mg     1.9     10-09    TPro  5.2<L>  /  Alb  2.8<L>  /  TBili  0.4  /  DBili  x   /  AST  11  /  ALT  10  /  AlkPhos  89  10-09    LIVER FUNCTIONS - ( 09 Oct 2019 09:27 )  Alb: 2.8 g/dL / Pro: 5.2 g/dL / ALK PHOS: 89 U/L / ALT: 10 U/L / AST: 11 U/L / GGT: x               Culture - Urine (collected 07 Oct 2019 17:05)  Source: .Urine  Final Report (09 Oct 2019 15:50):    >100,000 CFU/ml Escherichia coli    >100,000 CFU/ml Enterobacter aerogenes  Organism: Gram Negative Rods  Gram Negative Rods (09 Oct 2019 15:51)  Organism: Gram Negative Rods (09 Oct 2019 15:51)  Organism: Gram Negative Rods (09 Oct 2019 15:51)          RADIOLOGY & ADDITIONAL TESTS:    Imaging Personally Reviewed:     Consultant(s) Notes Reviewed:     Care Discussed with Consultants/Other Providers: Patient is a 68y old Female admitted for Melena (09 Oct 2019 19:27)    SUBJECTIVE / OVERNIGHT EVENTS:  Pt reports that she had a non-bloody BM last night. Reports chronic back pain that is unchanged. Denies dyspnea, chest pain, cough, hematochezia, and melena.    REVIEW OF SYSTEMS:    CONSTITUTIONAL: No weakness, fevers or chills  EYES/ENT: No visual changes;  No vertigo or throat pain   NECK: No pain or stiffness  RESPIRATORY: No cough, wheezing, hemoptysis; No shortness of breath  CARDIOVASCULAR: No chest pain or palpitations  GASTROINTESTINAL: No abdominal or epigastric pain. No nausea, vomiting, or hematemesis; No diarrhea or constipation. No melena or hematochezia.  GENITOURINARY: No dysuria, frequency or hematuria  NEUROLOGICAL: +unchanged chronic back pain. No numbness or weakness  SKIN: No itching, burning, rashes, or lesions   All other review of systems is negative unless indicated above.    MEDICATIONS  (STANDING):  artificial tears (preservative free) Ophthalmic Solution 1 Drop(s) Both EYES two times a day  ascorbic acid 500 milliGRAM(s) Oral daily  atorvastatin 40 milliGRAM(s) Oral at bedtime  BACItracin   Ointment 1 Application(s) Topical four times a day  baclofen 20 milliGRAM(s) Oral two times a day  DULoxetine 30 milliGRAM(s) Oral two times a day  gabapentin 800 milliGRAM(s) Oral three times a day  multivitamin 1 Tablet(s) Oral daily  ofloxacin 0.3% Solution 1 Drop(s) Left EYE four times a day  pantoprazole  Injectable 40 milliGRAM(s) IV Push two times a day  prednisoLONE acetate 1% Suspension 1 Drop(s) Left EYE two times a day  valACYclovir 1000 milliGRAM(s) Oral two times a day    MEDICATIONS  (PRN):  acetaminophen   Tablet .. 650 milliGRAM(s) Oral every 6 hours PRN Mild Pain (1 - 3)  oxyCODONE    IR 10 milliGRAM(s) Oral every 6 hours PRN Moderate Pain (4-6) Severe Pain( 7-10)  zolpidem 5 milliGRAM(s) Oral at bedtime PRN Insomnia      Allergies  No Known Allergies      OBJECTIVE:    Vital Signs Last 24 Hrs  T(C): 36.8 (10 Oct 2019 04:44), Max: 36.9 (09 Oct 2019 20:27)  T(F): 98.2 (10 Oct 2019 04:44), Max: 98.5 (09 Oct 2019 20:27)  HR: 78 (10 Oct 2019 04:44) (71 - 79)  BP: 173/79 (10 Oct 2019 04:44) (132/66 - 173/79)  BP(mean): --  RR: 18 (10 Oct 2019 04:44) (18 - 18)  SpO2: 98% (10 Oct 2019 04:44) (96% - 98%)    CAPILLARY BLOOD GLUCOSE    I&O's Summary    09 Oct 2019 07:01  -  10 Oct 2019 07:00  --------------------------------------------------------  IN: 0 mL / OUT: 1800 mL / NET: -1800 mL      PHYSICAL EXAM:  GENERAL: No acute distress, well-developed  HEAD: atraumatic, normocephalic  EYES: Conjunctiva and sclera clear  NECK: Supple, no lymphadenopathy, no JVD  CHEST/LUNG: CTAB; No wheezing, rales, or rhonchi  HEART: RRR; Normal S1/S2. No murmurs, rubs, or gallops  ABDOMEN: Soft, non-tender, non-distended; normal bowel sounds, no organomegaly  EXTREMITIES:  2+ peripheral pulses b/l, No clubbing, cyanosis, or edema  NEUROLOGY: A&O x3, no focal deficits  SKIN: Warm, dry, intact; No rashes or lesions      LABS:                        8.2    7.60  )-----------( 336      ( 09 Oct 2019 22:17 )             25.7     10-09    139  |  104  |  17  ----------------------------<  104<H>  3.8   |  25  |  0.46<L>    Ca    8.1<L>      09 Oct 2019 09:27  Phos  3.2     10-09  Mg     1.9     10-09    TPro  5.2<L>  /  Alb  2.8<L>  /  TBili  0.4  /  DBili  x   /  AST  11  /  ALT  10  /  AlkPhos  89  10-09    LIVER FUNCTIONS - ( 09 Oct 2019 09:27 )  Alb: 2.8 g/dL / Pro: 5.2 g/dL / ALK PHOS: 89 U/L / ALT: 10 U/L / AST: 11 U/L / GGT: x               Culture - Urine (collected 07 Oct 2019 17:05)  Source: .Urine  Final Report (09 Oct 2019 15:50):    >100,000 CFU/ml Escherichia coli    >100,000 CFU/ml Enterobacter aerogenes  Organism: Gram Negative Rods  Gram Negative Rods (09 Oct 2019 15:51)  Organism: Gram Negative Rods (09 Oct 2019 15:51)  Organism: Gram Negative Rods (09 Oct 2019 15:51)          RADIOLOGY & ADDITIONAL TESTS:    Imaging Personally Reviewed:     Consultant(s) Notes Reviewed:     Care Discussed with Consultants/Other Providers:

## 2019-10-10 NOTE — PROGRESS NOTE ADULT - ASSESSMENT
68F h/o aortic dissection (post-repair several years prior), spinal cord hematoma (now functionally paraplegic), chronic spasticity + pain (on Oxycodone and Baclofen pump), HTN, nephrolithiasis s/p left urethral stent, UTIs and endotheliitis/keratitis (post-corneal transplant) admitted for melena. No source of bleeding identified on EGD or pill endoscopy.

## 2019-10-10 NOTE — CHART NOTE - NSCHARTNOTEFT_GEN_A_CORE
Marce is a very pleasant 68 Year-Old Lady with history of aortic dissection s/p thoraco-abdominal aortic replacement with aorto-mesenteric bypass in 09/2016 with Dr. Barriga, spinal cord hematoma (now functionally paraplegic), chronic spasticity + pain (on Oxycodone and Baclofen pump), HTN, nephrolithiasis s/p left urethral stent, UTIs and endotheliitis/keratitis (post-corneal transplant) admitted for recurrent GIB requiring transfusion. Vascular Surgery consulted for concern for interval development of aortoenteric fistula in setting of significant recurrent GI bleed    - No Acute Surgical intervention indicated at this time.   - Appreciate GI eval: EGD negative, currently undergoing capsule endoscopy.   - If patient rebleeds, would obtain STAT CT Angio Chest/Abdomen/Pelvis with additional delayed phase imaging to assess for possible aorto-enteric fistula as well as for collection inside GI tract.   - Discussed with Attending Surgeon, low likelihood of aortoenteric fistula based on current presentation and prior imaging  - Vascular will sign off, please call with any questions    Vascular Surgery Pager #[phone]

## 2019-10-10 NOTE — PHYSICAL THERAPY INITIAL EVALUATION ADULT - STRENGTHENING, PT EVAL
Goal: Improve BUE strength to 4/5 to improve limb stability, ease and safety of transfers within3-4 weeks.

## 2019-10-10 NOTE — PROGRESS NOTE ADULT - PROBLEM SELECTOR PLAN 2
Acute blood loss anemia likely in the setting of UGIB, now improved.  - Plan as detailed above  - Monitor CBC q12h

## 2019-10-10 NOTE — PROGRESS NOTE ADULT - PROBLEM SELECTOR PLAN 1
Acute blood loss anemia concern for UGI bleed, now improved.  - Black stools and symptoms of anemia. Has had ~10 endoscopies and pill endoscopy over past few months but no colonoscopy. Previous 2 hospitalizations required pRBC transfusions.  - BUN/SCr ratio >30  - No source of bleeding identified on urgent EGD 10/7 and pill endoscopy 10/8  - PPI 40 IV BID  - CBC q12h  - Maintain active type and screen, transfuse with Hgb < 7  - GI Dr. Arteaga following, appreciate recs

## 2019-10-10 NOTE — PHYSICAL THERAPY INITIAL EVALUATION ADULT - GENERAL OBSERVATIONS, REHAB EVAL
69 y/o F pt with PMH of aortic dissection (post-repair several years prior) spinal cord hematoma (now functionally paraplegic), chronic spasticity and pain (on Oxycodone and Baclofen pump), presented with  black tarry stools was found to have ?acute blood loss anemia.

## 2019-10-10 NOTE — PHYSICAL THERAPY INITIAL EVALUATION ADULT - ADDITIONAL COMMENTS
According to the pt she lives with her spouse, she is dependent with all ADLs uses wheelchair prior to hospitalization. Pt required assistance with bed mobility, however was independent with the bed to wheelchair and wheel chair to car transfers. As per the pt she uses the slide board only for the wheelchair to car transfer. Pt has private hire aide 5hrs/ 6days. Pt has ramp access to enter the home and owns hospital bed, air mattress.

## 2019-10-10 NOTE — PROGRESS NOTE ADULT - PROBLEM SELECTOR PLAN 7
DVT ppx: IMPROVE 2, AC contraindicated given GIB, SCDs  Diet: Regular    Leonidas Arechiga MD  Internal Medicine PGY-1  [phone] / [phone]

## 2019-10-10 NOTE — PROGRESS NOTE ADULT - SUBJECTIVE AND OBJECTIVE BOX
[facility] Ophthalmology Follow Up Note    Interval: Pt notes that her eye pain is improved in the left eye today.     Mood and Affect Appropriate ( x ),  Oriented to Time, Place, and Person x 3 ( x )    Ophthalmology Exam:  Visual acuity (cc): 20/25 OD , HM only OS   Pupils: PERRL OD, OS surgical, no RAPD by reverse  Ttono: 16 OD 17 OS   Extraocular movements (EOMs): grossly full     Slit lamp Exam (SLE)  External: Flat OU  Lids/Lashes/Lacrimal Ducts: Flat OU  Sclera/Conjunctiva: W+Q OD, trace injection OS  Cornea: Cl OD,  OS: PK with stitches buried- no loose stitches; has resolved epithelial defect, diffuse K edema OS. 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 Pt is on pred forte QID OS and valtrex 1g QD as outpatient for suppression, Pt presented initially with worsened left eye pain, and was found to have epithelial defect of the left eye. Antibiotic therapy was initiated. Pt has demonstrate resolution of epithelial defect. Pt's exam findings were discussed with patient's outpt ophthalmologist, Dr. Blank who is aware that graft is failing.   - Prednisolone acetate 1% drops to BID in left eye  - continue Bacitracin ointment QID OS   - continue Ofloxacin QID OS   - Valtrex 1g BID if medically permissible   - findings and plan discussed with patient  and primary team      Outpatient follow-up: Patient should follow-up with his/her ophthalmologist or with [facility] Department of Ophthalmology- [place] within 1 week of after discharge at:  [address]  [place], [place] [id number] [phone] [facility] Ophthalmology Follow Up Note    Interval: Pt notes that her eye pain is improved in the left eye today.     Mood and Affect Appropriate ( x ),  Oriented to Time, Place, and Person x 3 ( x )    Ophthalmology Exam:  Visual acuity (cc): 20/25 OD , HM only OS   Pupils: PERRL OD, OS surgical, no RAPD by reverse  Ttono: 16 OD 17 OS   Extraocular movements (EOMs): grossly full     Slit lamp Exam (SLE)  External: Flat OU  Lids/Lashes/Lacrimal Ducts: Flat OU  Sclera/Conjunctiva: W+Q OD, trace injection OS  Cornea: Cl OD,  OS: PK with stitches buried- no loose stitches; no epithelial defect, diffuse K edema OS. 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 Pt is on pred forte QID OS and valtrex 1g QD as outpatient for suppression, Pt presented initially with worsened left eye pain, and was found to have epithelial defect of the left eye. Antibiotic therapy was initiated. Pt has demonstrate resolution of epithelial defect. Pt's exam findings were discussed with patient's outpt ophthalmologist, Dr. Blank who is aware that graft is failing.   - Prednisolone acetate 1% drops to BID in left eye  - continue Bacitracin ointment QID OS   - continue Ofloxacin QID OS   - Valtrex 1g BID if medically permissible   - findings and plan discussed with patient and primary team      Outpatient follow-up: Patient should follow-up with his/her ophthalmologist or with [facility] Department of Ophthalmology- [place] within 1 week of after discharge at:  [address]  [place] [address]  [phone]

## 2019-10-10 NOTE — PROGRESS NOTE ADULT - PROBLEM SELECTOR PLAN 6
L eye discomfort  - c/w Valacyclovir 1000mg BID  - c/w prednisolone 1% drops BID L eye  - c/w bacitracin ointment QID L eye  - c/w Ofloxacin QID L eye  - Ophtho following, appreciate recs

## 2019-10-10 NOTE — PHYSICAL THERAPY INITIAL EVALUATION ADULT - PERTINENT HX OF CURRENT PROBLEM, REHAB EVAL
Pt received multiple PRBC transfusions and Hb is now improved to 9.0 initially 6.9.During the current hospital course no source of bleeding was identified on EGD or pill endoscopy. Pt has had multiple recent hospitalizations with multiple EGDs in an attempt to find an identifying source.

## 2019-10-10 NOTE — PROGRESS NOTE ADULT - PROBLEM SELECTOR PLAN 5
Uses baclofen pump and spinal stimulator, per  has not been helpful.  - Pump last refilled 9/18/19 in MICU, next refill due by 12/1/19  - Hypotensive earlier held off on pain medication  - Oxycodone 10mg q4h PRN for moderate to severe pain   - Continue Duloxetine 30mg BID  - Continue Gabapentin 800mg TID  - Continue Baclofen 20mg BID   - ISTOP in the chart

## 2019-10-10 NOTE — PROGRESS NOTE ADULT - PROBLEM SELECTOR PLAN 3
Due to paraplegia, self-catheterizes. Has a left ureteral stent, hx of nephrolithiasis. Previous urine culture grew ESBL Klebsiella on 10/1 not treated but previous hx of ESBL Klebsiella 2/2 sepsis was treated with Meropenem.  - No dysuria  - UA with +leuk esterase, many bacteria and WBC 49.   - UCx growing >100,000 CFU/mL GNRs, will f/u speciation and sensitivities  - If symptomatic, develops fevers or is hemodynamically unstable will treat with Meropenem (sensitive in the past)

## 2019-10-11 NOTE — DISCHARGE NOTE PROVIDER - NSDCCAREPROVSEEN_GEN_ALL_CORE_FT
[facility] Medicine, 5M Care Model Team B  [facility]

## 2019-10-11 NOTE — PROGRESS NOTE ADULT - PROBLEM SELECTOR PLAN 1
Acute blood loss anemia concern for UGI bleed, now improved.  - No source of bleeding identified on urgent EGD 10/7 and pill endoscopy 10/8  - PPI 40 IV BID  - CBC q12h  - Maintain active type and screen, transfuse with Hgb < 7  - Vascular consulted and have low suspicion for aortoenteric fistula   - GI Dr. Arteaga following, appreciate recs Acute blood loss anemia concern for UGI bleed, now improved.  - No source of bleeding identified on urgent EGD 10/7 and pill endoscopy 10/8  - PPI 40 IV BID  - CBC daily  - Maintain active type and screen, transfuse with Hgb < 7  - Vascular consulted and have low suspicion for aortoenteric fistula   - GI Dr. Arteaga following, appreciate recs

## 2019-10-11 NOTE — PROGRESS NOTE ADULT - PROBLEM SELECTOR PLAN 6
- c/w Valacyclovir 1000mg BID  - c/w prednisolone 1% drops BID L eye  - c/w bacitracin ointment QID L eye  - c/w Ofloxacin QID L eye  - Ophtho following, appreciate recs

## 2019-10-11 NOTE — PROGRESS NOTE ADULT - SUBJECTIVE AND OBJECTIVE BOX
Patient is a 68y old  Female who presents with a chief complaint of Melena (10 Oct 2019 14:15)      SUBJECTIVE / OVERNIGHT EVENTS: Small loose BM over night, reportedly with no evidence of blood. No acute events over night. Pt seen and examined. Pt denies any fevers, chills, n/v, CP, SOB, abdominal pain, or dysuria     MEDICATIONS  (STANDING):  artificial tears (preservative free) Ophthalmic Solution 1 Drop(s) Both EYES two times a day  ascorbic acid 500 milliGRAM(s) Oral daily  atorvastatin 40 milliGRAM(s) Oral at bedtime  BACItracin   Ointment 1 Application(s) Topical four times a day  baclofen 20 milliGRAM(s) Oral two times a day  DULoxetine 30 milliGRAM(s) Oral two times a day  gabapentin 800 milliGRAM(s) Oral three times a day  multivitamin 1 Tablet(s) Oral daily  ofloxacin 0.3% Solution 1 Drop(s) Left EYE four times a day  pantoprazole  Injectable 40 milliGRAM(s) IV Push two times a day  prednisoLONE acetate 1% Suspension 1 Drop(s) Left EYE two times a day  valACYclovir 1000 milliGRAM(s) Oral two times a day    MEDICATIONS  (PRN):  acetaminophen   Tablet .. 650 milliGRAM(s) Oral every 6 hours PRN Mild Pain (1 - 3)  oxyCODONE    IR 10 milliGRAM(s) Oral every 6 hours PRN Moderate Pain (4-6) Severe Pain( 7-10)      CAPILLARY BLOOD GLUCOSE        I&O's Summary    10 Oct 2019 07:01  -  11 Oct 2019 07:00  --------------------------------------------------------  IN: 120 mL / OUT: 1600 mL / NET: -1480 mL        T(C): 36.8 (10-11-19 @ 04:52), Max: 36.8 (10-11-19 @ 04:52)  HR: 83 (10-11-19 @ 04:52) (71 - 89)  BP: 148/78 (10-11-19 @ 04:52) (145/72 - 165/77)  RR: 18 (10-11-19 @ 04:52) (18 - 18)  SpO2: 96% (10-11-19 @ 04:52) (95% - 97%)  PHYSICAL EXAM:    GENERAL: NAD, well-developed  HEAD:  Atraumatic, Normocephalic  EYES: EOMI, PERRLA, conjuctiva clear  CHEST/LUNG: Clear to auscultation bilaterally; No wheeze  HEART: Regular rate and rhythm; No murmurs, rubs, or gallops  ABDOMEN: Soft, Nontender, Nondistended; Bowel sounds present  EXTREMITIES:  2+ Peripheral Pulses, No clubbing, cyanosis, or edema  PSYCH: AAOx3  NEUROLOGY: non-focal      LABS:                        8.2    7.40  )-----------( 288      ( 11 Oct 2019 06:43 )             25.9     WBC Trend: 7.40<--, 7.43<--, 6.47<--  10-10    139  |  104  |  14  ----------------------------<  108<H>  4.1   |  26  |  0.69    Ca    8.5      10 Oct 2019 11:14  Phos  3.3     10-10  Mg     1.9     10-10    TPro  5.2<L>  /  Alb  2.8<L>  /  TBili  0.4  /  DBili  x   /  AST  11  /  ALT  10  /  AlkPhos  89  10-09    Creatinine Trend: 0.69<--, 0.46<--, 0.52<--, 0.48<--, 0.55<--, 0.29<--              RADIOLOGY & ADDITIONAL TESTS:    Imaging Personally Reviewed:    Consultant(s) Notes Reviewed:      Care Discussed with Consultants/Other Providers:

## 2019-10-11 NOTE — CHART NOTE - NSCHARTNOTEFT_GEN_A_CORE
Nutrition Note    Patient seen for malnutrition follow up     Spoke to pt at beside who reported improved appetite and po intake over the past week, consuming 75% of breakfast in-house. Family brings in food for lunch and dinner, pt is able to consume all the meals family provides. Stated pt dislikes the food in-house. Denied any nausea, vomiting, constipation, or diarrhea; tolerating diet.      Diet: Admitted 10/7, NPO and clear liquid diet upon admission. Diet advanced to Regular on 10/8.       Daily Weight: 52.2 kg (10-09) via bed scale , 51.7 kg (10-08) standing weight   Weight Change: weight stable, +/- 1#    Pertinent Medications: MEDICATIONS  (STANDING):  artificial tears (preservative free) Ophthalmic Solution 1 Drop(s) Both EYES two times a day  ascorbic acid 500 milliGRAM(s) Oral daily  atorvastatin 40 milliGRAM(s) Oral at bedtime  BACItracin   Ointment 1 Application(s) Topical four times a day  baclofen 20 milliGRAM(s) Oral two times a day  DULoxetine 30 milliGRAM(s) Oral two times a day  gabapentin 800 milliGRAM(s) Oral three times a day  multivitamin 1 Tablet(s) Oral daily  ofloxacin 0.3% Solution 1 Drop(s) Left EYE four times a day  pantoprazole  Injectable 40 milliGRAM(s) IV Push two times a day  prednisoLONE acetate 1% Suspension 1 Drop(s) Left EYE two times a day  valACYclovir 1000 milliGRAM(s) Oral two times a day    MEDICATIONS  (PRN):  acetaminophen   Tablet .. 650 milliGRAM(s) Oral every 6 hours PRN Mild Pain (1 - 3)  oxyCODONE    IR 10 milliGRAM(s) Oral every 6 hours PRN Moderate Pain (4-6) Severe Pain( 7-10)    Pertinent Labs:       Skin: sacrum stage 1 (10-09)     Edema: 1+ generalized, 2+ left periorbital (10-11)    Estimated Needs:   [X] no change since previous assessment  [ ] recalculated:     Previous Nutrition Diagnosis: severe malnutrition   Nutrition Diagnosis is: improving, addressed with po diet     New Nutrition Diagnosis: n/a       Recommend  1) continue regular diet     Monitoring and Evaluation:     Continue to monitor PO intake, tolerance to diet, weights, labs, skin integrity    RD remains available upon request and will follow up per protocol

## 2019-10-11 NOTE — DISCHARGE NOTE PROVIDER - PROVIDER TOKENS
PROVIDER:[TOKEN:[[id number]]] PROVIDER:[TOKEN:[[id number]],FOLLOWUP:[1 week]],PROVIDER:[TOKEN:[[id number]],FOLLOWUP:[2 weeks]],FREE:[LAST:[Hadi],FIRST:[Artur],PHONE:[phone]:[(   )    -],ADDRESS:[[address] [place] [address]],FOLLOWUP:[1 week]],FREE:[LAST:[Your Ophthalmologist],PHONE:[(   )    -],FAX:[(   )    -],FOLLOWUP:[1 week]] PROVIDER:[TOKEN:[[id number]],FOLLOWUP:[1 week]],PROVIDER:[TOKEN:[[id number]],FOLLOWUP:[2 weeks]],FREE:[LAST:[Hadi],FIRST:[Artur],PHONE:[phone]],FAX:[(   )    -],ADDRESS:[[address] [place] [address]],FOLLOWUP:[1 week]],FREE:[LAST:[Your Ophthalmologist],PHONE:[(   )    -],FAX:[(   )    -],FOLLOWUP:[1 week]],PROVIDER:[TOKEN:[[id number]],FOLLOWUP:[1 month]]

## 2019-10-11 NOTE — DISCHARGE NOTE PROVIDER - CARE PROVIDER_API CALL
Donell Ambrocio)  Gastroenterology  [address] [facility], [address]  [place] [address]  Phone: [phone]  Fax: [phone]  Follow Up Time: Donell Ambrocio)  Gastroenterology  [address] [facility], [address]  [place] [address]  Phone: [phone]  Fax: [phone]  Follow Up Time: 1 week    Basil Branham)  Cardiovascular Disease; Internal Medicine  [address] [facility], [address]  [place] [address]  Phone: [phone]  Fax: [phone]  Follow Up Time: 2 weeks    Artur Vieira  [address] [place] [address] [place] [address]  Phone: [phone]  Fax: (   )    -  Follow Up Time: 1 week    Your Ophthalmologist,   Phone: (   )    -  Fax: (   )    -  Follow Up Time: 1 week Donell Ambrocio)  Gastroenterology  [address] [facility], [address]  [place] [address]  Phone: [phone]  Fax: [phone]  Follow Up Time: 1 week    Basil Branham)  Cardiovascular Disease; Internal Medicine  [address] [facility], [address]  [place] [address]  Phone: [phone]  Fax: [phone]  Follow Up Time: 2 weeks    Artur Vieira  [address] [place] [address] [address] [facility]  [place] [address]  Phone: [phone]  Fax: (   )    -  Follow Up Time: 1 week    Your Ophthalmologist,   Phone: (   )    -  Fax: (   )    -  Follow Up Time: 1 week    Katheryn Soto)  Urology  [address] [place] [address]  Phone: [phone]  Fax: [phone]  Follow Up Time: 1 month

## 2019-10-11 NOTE — DISCHARGE NOTE PROVIDER - CARE PROVIDERS DIRECT ADDRESSES
,DirectAddress_Unknown ,DirectAddress_Unknown,DirectAddress_Unknown,DirectAddress_Unknown,DirectAddress_Unknown ,DirectAddress_Unknown,DirectAddress_Unknown,DirectAddress_Unknown,DirectAddress_Unknown,[email or web address]

## 2019-10-11 NOTE — DISCHARGE NOTE PROVIDER - HOSPITAL COURSE
68 F w/ a pmh significant for aortic dissection (post-repair several years prior), spinal cord hematoma (now functionally paraplegic), chronic spasticity + pain (on Oxycodone and Baclofen pump), HTN, nephrolithiasis s/p left urethral stent, UTIs and endotheliitis/keratitis (post-corneal transplant) who initially presented to the ED w/ a chief complaint of melena. Pt reportedly had several dark, tarry bowel movements the night prior to presentation prompting her visit to the ED. In the ED,  pt became hypotensive with one bloody BM for which she was given IVF with improvement in her hemodynamics. She underwent urgent EGD however it showed no evidence of any blood in the stomach or active bleeding. She subsequently underwent video capsule endoscopy which also showed no evidence of bleeding. Vascular surgery was consulted and did not believe there was any evidence of aortoenteric fistula. Her melena subsided and she clinically improved throughout her hospital course. She was deemed hemodynamically stable for discharge. PT advised to follow up with her Gastroenterologist upon discharge for further management and care. **Incomplete**    68 F w/ a pmh significant for aortic dissection (post-repair several years prior), spinal cord hematoma (now functionally paraplegic), chronic spasticity + pain (on Oxycodone and Baclofen pump), HTN, nephrolithiasis s/p left urethral stent, UTIs and endotheliitis/keratitis (post-corneal transplant) who initially presented to the ED w/ a chief complaint of melena. Pt reportedly had several dark, tarry bowel movements the night prior to presentation prompting her visit to the ED. In the ED,  pt became hypotensive with one bloody BM for which she was given IVF with improvement in her hemodynamics. She underwent urgent EGD however it showed no evidence of any blood in the stomach or active bleeding. She subsequently underwent video capsule endoscopy which also showed no evidence of bleeding. Vascular surgery was consulted and did not believe there was any evidence of aortoenteric fistula. Course complicated by episode of melena with associated tachycardia and hypotension. Pt given 1u pRBC with inappropriate improvement of hgb. EGD did not show source of bleed. Pt suspected to have dieulafoy. After this episode, pt remained HDS with no further melena and documented brown BMs. Hgb improved. She was deemed hemodynamically stable for discharge. Pt advised to follow up with her Gastroenterologist upon discharge for further management and care. 68 F w/ a pmh significant for aortic dissection (post-repair several years prior), spinal cord hematoma (now functionally paraplegic), chronic spasticity + pain (on Oxycodone and Baclofen pump), HTN, nephrolithiasis s/p left urethral stent, UTIs and endotheliitis/keratitis (post-corneal transplant) who initially presented to the ED w/ a chief complaint of melena. Pt reportedly had several dark, tarry bowel movements the night prior to presentation prompting her visit to the ED. In the ED,  pt became hypotensive with one bloody BM for which she was given IVF with improvement in her hemodynamics. She underwent urgent EGD however it showed no evidence of any blood in the stomach or active bleeding. She subsequently underwent video capsule endoscopy which also showed no evidence of bleeding. Vascular surgery was consulted and did not believe there was any evidence of aortoenteric fistula. Course complicated by episode of melena with associated tachycardia and hypotension. Pt given 1u pRBC with inappropriate improvement of hgb. EGD did not show source of bleed. Pt suspected to have dieulafoy. After this episode, pt remained HDS with no further melena and documented brown BMs. Hgb improved. She was deemed hemodynamically stable for discharge. Pt advised to follow up with her Gastroenterologist upon discharge for further management and care. 68 F w/ a pmh significant for aortic dissection (post-repair several years prior), spinal cord hematoma (now functionally paraplegic), chronic spasticity + pain (on Oxycodone and Baclofen pump), HTN, nephrolithiasis s/p left urethral stent, UTIs and endotheliitis/keratitis (post-corneal transplant) who initially presented to the ED w/ a chief complaint of melena. Pt reportedly had several dark, tarry bowel movements the night prior to presentation prompting her visit to the ED. In the ED,  pt became hypotensive with one bloody BM for which she was given IVF with improvement in her hemodynamics. She underwent urgent EGD however it showed no evidence of any blood in the stomach or active bleeding. She subsequently underwent video capsule endoscopy which also showed no evidence of bleeding. Vascular surgery was consulted and did not believe there was any evidence of aortoenteric fistula. She also had CT a/p with IV contrast which showed no extravasation. Course complicated by episode of melena with associated tachycardia and hypotension. Pt given 1u pRBC with inappropriate improvement of hgb. Second EGD did not show source of bleed. Pt suspected to have dieulafoy. After this episode, pt remained HDS with no further melena and documented brown BMs. Hgb improved. She was deemed hemodynamically stable for discharge. Pt advised to follow up with her Gastroenterologist upon discharge for further management and care.

## 2019-10-11 NOTE — DISCHARGE NOTE PROVIDER - NSFOLLOWUPCLINICS_GEN_ALL_ED_FT
[facility]  Ophthalmology  [address] [place] [address] [address]  [place] [address]  Phone: [phone]  Fax:   Follow Up Time: 7-10 Days

## 2019-10-11 NOTE — DISCHARGE NOTE PROVIDER - NSDCCPCAREPLAN_GEN_ALL_CORE_FT
PRINCIPAL DISCHARGE DIAGNOSIS  Diagnosis: Melena  Assessment and Plan of Treatment: You came to the hospital with bloody bowel movements. You underwent an endoscopy procedure which did not show any evidence of active bleeding. You also underwent a capsule study which also didnt show any sign of active bleeding. PRINCIPAL DISCHARGE DIAGNOSIS  Diagnosis: Melena  Assessment and Plan of Treatment: You came to the hospital with bloody bowel movements. You underwent an endoscopy procedure which did not show any evidence of active bleeding. You also underwent a capsule study which also didnt show any sign of active bleeding. After you had another episode of dark stool in the hospital, you had another endoscopy which also did not show a clear source of the bleeding. You required several blood transfusions while in the hospital. Your blood counts improved over the course of your stay and your dark stools resolved. Please follow-up with your primary care doctor and gastroenterologist.      SECONDARY DISCHARGE DIAGNOSES  Diagnosis: History of nephrolithiasis  Assessment and Plan of Treatment: You have a history of kidney stones, as well as stent placement in your ureter. Please follow-up with your urologist for further care of this stent.    Diagnosis: Corneal transplant status  Assessment and Plan of Treatment: **Incomplete**  We maintained you on your medications for your corneal graft. You were found to have a defect on the surface of your eye, which is one cause for your eye pain. You were given ointments and eye drops to help with the healing of this defect, but this can take time. Please continue to use ***(eyedrops)**. Please follow-up with your ophthalmologist.    Diagnosis: Abnormal urinalysis  Assessment and Plan of Treatment: You have a history of urinary tract infections. While you did have a urine test which was abnormal during this hospital stay, you did not have any symptoms such as pain with urination or urgency. Therefore, we decided not to treat you with antibiotics.    Diagnosis: Dorsalgia of lumbar region  Assessment and Plan of Treatment: For your chronic back pain and muscle spasms, we maintained you on your outpatient pain regimen. Your pain proved to be uncontrolled, so we adjusted the timing of your medications to better control it. Please follow-up with your outpatient pain management doctor for further care. PRINCIPAL DISCHARGE DIAGNOSIS  Diagnosis: Melena  Assessment and Plan of Treatment: You came to the hospital with bloody bowel movements. You underwent an endoscopy procedure which did not show any evidence of active bleeding. You also underwent a capsule study which also didnt show any sign of active bleeding. After you had another episode of dark stool in the hospital, you had another endoscopy which also did not show a clear source of the bleeding. You required several blood transfusions while in the hospital. Your blood counts improved over the course of your stay and your dark stools resolved. Please follow-up with your primary care doctor and gastroenterologist.      SECONDARY DISCHARGE DIAGNOSES  Diagnosis: History of nephrolithiasis  Assessment and Plan of Treatment: You have a history of kidney stones, as well as stent placement in your ureter. Please follow-up with your urologist for further care of this stent.    Diagnosis: Corneal transplant status  Assessment and Plan of Treatment: We maintained you on your medications for your corneal graft. You were found to have a defect on the surface of your eye, which is one cause for your eye pain. You were given ointments and eye drops to help with the healing of this defect, but this can take time. Please continue to use your eye ointment and drops for your left eye as follows:  - Prednisolone acetate 1% drops twice a day  - Bacitracin ointment four times a day  - Ofloxacin four times a day  Drops should go in before the ointment, and can wipe of excess ointment prior to placing drops into the eye (please wait 1 minute in between drops), and then put another set of ointment after drops are placed.  Please also continue to take oral valacyclovir (an antiviral agent) 1g twice a day.      Please follow-up with your ophthalmologist.    Diagnosis: Abnormal urinalysis  Assessment and Plan of Treatment: You have a history of urinary tract infections. While you did have a urine test which was abnormal during this hospital stay, you did not have any symptoms such as pain with urination or urgency. Therefore, we decided not to treat you with antibiotics.    Diagnosis: Dorsalgia of lumbar region  Assessment and Plan of Treatment: For your chronic back pain and muscle spasms, we maintained you on your outpatient pain regimen. Your pain proved to be uncontrolled, so we adjusted the timing of your medications to better control it. Please follow-up with your outpatient pain management doctor for further care.

## 2019-10-11 NOTE — PROGRESS NOTE ADULT - PROBLEM SELECTOR PLAN 3
Paraplegic/ self-catheterizes. L ureteral stent, hx of nephrolithiasis, and ESBL   - No dysuria  - UA with +leuk esterase, many bacteria and WBC 49.   - UCx growing >100,000 CFU/mL GNRs, will f/u speciation and sensitivities  - If symptomatic, develops fevers or is hemodynamically unstable will treat with Meropenem (sensitive in the past)

## 2019-10-12 NOTE — PROGRESS NOTE ADULT - PROBLEM SELECTOR PLAN 2
Acute blood loss anemia likely in the setting of UGIB, now improved.  - Plan as detailed above  - Monitor CBC qd Acute blood loss anemia likely in the setting of UGIB, now improved.  - Plan as detailed above  - Monitor CBC q12h

## 2019-10-12 NOTE — PROGRESS NOTE ADULT - PROBLEM SELECTOR PLAN 1
Acute blood loss anemia concern for UGI bleed, now improved.  - No source of bleeding identified on urgent EGD 10/7 and pill endoscopy 10/8  - PPI 40 IV BID  - CBC daily  - Maintain active type and screen, transfuse with Hgb < 7  - Vascular consulted and have low suspicion for aortoenteric fistula   - GI Dr. Arteaga following, appreciate recs Acute blood loss anemia concern for UGI bleed, now improved.  - No source of bleeding identified on urgent EGD 10/7 and pill endoscopy 10/8  - PPI 40 IV BID  - CBC qd  - Maintain active type and screen, transfuse with Hgb < 7  - Vascular consulted and have low suspicion for aortoenteric fistula   - GI Dr. Arteaga following, appreciate recs Acute blood loss anemia concern for UGI bleed, now improved.  - No source of bleeding identified on urgent EGD 10/7 and pill endoscopy 10/8  - PPI 40 IV BID  - CBC q12h  - Maintain active type and screen, transfuse with Hgb < 7  - Vascular consulted and have low suspicion for aortoenteric fistula   - GI Dr. Arteaga following, appreciate recs

## 2019-10-12 NOTE — PROGRESS NOTE ADULT - ASSESSMENT
In summary, UGI bleed, unclear etiology or source.  Slight decline in H/H but no signs of active GI bleed.    Continue to support and trend H/H.    Donell Ambrocio MD

## 2019-10-12 NOTE — PROVIDER CONTACT NOTE (OTHER) - ACTION/TREATMENT ORDERED:
will order bedtime dose of ambien will order bedtime dose of ambien; no action needed at this time for tachycardia

## 2019-10-12 NOTE — PROGRESS NOTE ADULT - SUBJECTIVE AND OBJECTIVE BOX
INTERVAL HPI/OVERNIGHT EVENTS:    No evidence of melena.  Had large BM last night, but was brown.  No fever or chills.  No abdominal pain    MEDICATIONS  (STANDING):  artificial tears (preservative free) Ophthalmic Solution 1 Drop(s) Both EYES two times a day  ascorbic acid 500 milliGRAM(s) Oral daily  atorvastatin 40 milliGRAM(s) Oral at bedtime  BACItracin   Ointment 1 Application(s) Topical four times a day  baclofen 20 milliGRAM(s) Oral two times a day  DULoxetine 30 milliGRAM(s) Oral two times a day  gabapentin 800 milliGRAM(s) Oral three times a day  multivitamin 1 Tablet(s) Oral daily  ofloxacin 0.3% Solution 1 Drop(s) Left EYE four times a day  pantoprazole  Injectable 40 milliGRAM(s) IV Push two times a day  prednisoLONE acetate 1% Suspension 1 Drop(s) Left EYE two times a day  valACYclovir 1000 milliGRAM(s) Oral two times a day    MEDICATIONS  (PRN):  acetaminophen   Tablet .. 650 milliGRAM(s) Oral every 6 hours PRN Mild Pain (1 - 3)  oxyCODONE    IR 10 milliGRAM(s) Oral every 6 hours PRN Moderate Pain (4-6) Severe Pain( 7-10)      Allergies    No Known Allergies    Intolerances        Review of Systems:  No CP SOB,   No diarrhea.  No fever or chills.    Vital Signs Last 24 Hrs  T(C): 36.8 (12 Oct 2019 04:15), Max: 37.1 (11 Oct 2019 13:59)  T(F): 98.2 (12 Oct 2019 04:15), Max: 98.7 (11 Oct 2019 13:59)  HR: 98 (12 Oct 2019 09:33) (83 - 109)  BP: 148/78 (12 Oct 2019 09:33) (148/78 - 165/75)  BP(mean): --  RR: 18 (12 Oct 2019 09:33) (18 - 20)  SpO2: 94% (12 Oct 2019 09:33) (93% - 98%)    PHYSICAL EXAM:  Constitutional: NAD, well-developed  Neck: No LAD, supple  Respiratory: CTAB  Cardiovascular: S1 and S2, RRR,   Gastrointestinal: BS+, soft, NT/ND, neg HSM,  es    LABS:                        7.9    8.29  )-----------( 287      ( 12 Oct 2019 06:46 )             24.8     10-10    139  |  104  |  14  ----------------------------<  108<H>  4.1   |  26  |  0.69    Ca    8.5      10 Oct 2019 11:14  Phos  3.3     10-10  Mg     1.9     10-10          LIVER FUNCTIONS - ( 09 Oct 2019 09:27 )  Alb: 2.8 g/dL / Pro: 5.2 g/dL / ALK PHOS: 89 U/L / ALT: 10 U/L / AST: 11 U/L / GGT: x             RADIOLOGY & ADDITIONAL TESTS:

## 2019-10-12 NOTE — PROVIDER CONTACT NOTE (OTHER) - ACTION/TREATMENT ORDERED:
Complete cbc at midnight, administer oxycodone at 2111 hrs and repeat VS in 45 min after. Notify if pt continues to be tachycardic

## 2019-10-12 NOTE — PROGRESS NOTE ADULT - SUBJECTIVE AND OBJECTIVE BOX
Patient is a 68y old Female admitted for Melena (11 Oct 2019 13:24)      SUBJECTIVE / OVERNIGHT EVENTS:    REVIEW OF SYSTEMS:    CONSTITUTIONAL: No weakness, fevers or chills  EYES/ENT: No visual changes;  No vertigo or throat pain   NECK: No pain or stiffness  RESPIRATORY: No cough, wheezing, hemoptysis; No shortness of breath  CARDIOVASCULAR: No chest pain or palpitations  GASTROINTESTINAL: No abdominal or epigastric pain. No nausea, vomiting, or hematemesis; No diarrhea or constipation. No melena or hematochezia.  GENITOURINARY: No dysuria, frequency or hematuria  NEUROLOGICAL: No numbness or weakness  SKIN: No itching, burning, rashes, or lesions   All other review of systems is negative unless indicated above.    MEDICATIONS  (STANDING):  artificial tears (preservative free) Ophthalmic Solution 1 Drop(s) Both EYES two times a day  ascorbic acid 500 milliGRAM(s) Oral daily  atorvastatin 40 milliGRAM(s) Oral at bedtime  BACItracin   Ointment 1 Application(s) Topical four times a day  baclofen 20 milliGRAM(s) Oral two times a day  DULoxetine 30 milliGRAM(s) Oral two times a day  gabapentin 800 milliGRAM(s) Oral three times a day  multivitamin 1 Tablet(s) Oral daily  ofloxacin 0.3% Solution 1 Drop(s) Left EYE four times a day  pantoprazole  Injectable 40 milliGRAM(s) IV Push two times a day  prednisoLONE acetate 1% Suspension 1 Drop(s) Left EYE two times a day  valACYclovir 1000 milliGRAM(s) Oral two times a day    MEDICATIONS  (PRN):  acetaminophen   Tablet .. 650 milliGRAM(s) Oral every 6 hours PRN Mild Pain (1 - 3)  oxyCODONE    IR 10 milliGRAM(s) Oral every 6 hours PRN Moderate Pain (4-6) Severe Pain( 7-10)      Allergies    No Known Allergies    Intolerances        OBJECTIVE:    Vital Signs Last 24 Hrs  T(C): 36.8 (12 Oct 2019 04:15), Max: 37.1 (11 Oct 2019 13:59)  T(F): 98.2 (12 Oct 2019 04:15), Max: 98.7 (11 Oct 2019 13:59)  HR: 83 (12 Oct 2019 04:15) (83 - 88)  BP: 165/75 (12 Oct 2019 04:15) (157/87 - 165/75)  BP(mean): --  RR: 18 (12 Oct 2019 04:15) (18 - 18)  SpO2: 98% (12 Oct 2019 04:15) (95% - 98%)  CAPILLARY BLOOD GLUCOSE        I&O's Summary    11 Oct 2019 07:01  -  12 Oct 2019 07:00  --------------------------------------------------------  IN: 0 mL / OUT: 1750 mL / NET: -1750 mL          PHYSICAL EXAM:  GENERAL: No acute distress, well-developed  HEAD: atraumatic, normocephalic  EYES: EOMI, PERRLA, conjunctiva and sclera clear  NECK: Supple, no lymphadenopathy, no JVD  CHEST/LUNG: CTAB; No wheezing, rales, or rhonchi  HEART: RRR; Normal S1/S2. No murmurs, rubs, or gallops  ABDOMEN: Soft, non-tender, non-distended; normal bowel sounds, no organomegaly  EXTREMITIES:  2+ peripheral pulses b/l, No clubbing, cyanosis, or edema  NEUROLOGY: A&O x3, no focal deficits  SKIN: Warm, dry, intact; No rashes or lesions      LABS:                        7.9    8.29  )-----------( 287      ( 12 Oct 2019 06:46 )             24.8     10-10    139  |  104  |  14  ----------------------------<  108<H>  4.1   |  26  |  0.69    Ca    8.5      10 Oct 2019 11:14  Phos  3.3     10-10  Mg     1.9     10-10      RADIOLOGY & ADDITIONAL TESTS:    Imaging Personally Reviewed:     Consultant(s) Notes Reviewed:     Care Discussed with Consultants/Other Providers: Patient is a 68y old Female admitted for Melena (11 Oct 2019 13:24)    SUBJECTIVE / OVERNIGHT EVENTS:  No acute events overnight. No bloody BMs overnight. Currently reporting severe back pain shortly after receiving a dose of oxycodone. Denies dyspnea, chest pain, abdominal pain, hematochezia, and melena.    REVIEW OF SYSTEMS:    CONSTITUTIONAL: No weakness, fevers or chills  EYES/ENT: No visual changes;  No vertigo or throat pain   NECK: No pain or stiffness  RESPIRATORY: No cough, wheezing, hemoptysis; No shortness of breath  CARDIOVASCULAR: No chest pain or palpitations  GASTROINTESTINAL: No abdominal or epigastric pain. No nausea, vomiting, or hematemesis; No diarrhea or constipation. No melena or hematochezia.  GENITOURINARY: No dysuria, frequency or hematuria  NEUROLOGICAL: +severe back pain  SKIN: No itching, burning, rashes, or lesions   All other review of systems is negative unless indicated above.    MEDICATIONS  (STANDING):  artificial tears (preservative free) Ophthalmic Solution 1 Drop(s) Both EYES two times a day  ascorbic acid 500 milliGRAM(s) Oral daily  atorvastatin 40 milliGRAM(s) Oral at bedtime  BACItracin   Ointment 1 Application(s) Topical four times a day  baclofen 20 milliGRAM(s) Oral two times a day  DULoxetine 30 milliGRAM(s) Oral two times a day  gabapentin 800 milliGRAM(s) Oral three times a day  multivitamin 1 Tablet(s) Oral daily  ofloxacin 0.3% Solution 1 Drop(s) Left EYE four times a day  pantoprazole  Injectable 40 milliGRAM(s) IV Push two times a day  prednisoLONE acetate 1% Suspension 1 Drop(s) Left EYE two times a day  valACYclovir 1000 milliGRAM(s) Oral two times a day    MEDICATIONS  (PRN):  acetaminophen   Tablet .. 650 milliGRAM(s) Oral every 6 hours PRN Mild Pain (1 - 3)  oxyCODONE    IR 10 milliGRAM(s) Oral every 6 hours PRN Moderate Pain (4-6) Severe Pain( 7-10)      Allergies  No Known Allergies      OBJECTIVE:    Vital Signs Last 24 Hrs  T(C): 36.8 (12 Oct 2019 04:15), Max: 37.1 (11 Oct 2019 13:59)  T(F): 98.2 (12 Oct 2019 04:15), Max: 98.7 (11 Oct 2019 13:59)  HR: 83 (12 Oct 2019 04:15) (83 - 88)  BP: 165/75 (12 Oct 2019 04:15) (157/87 - 165/75)  BP(mean): --  RR: 18 (12 Oct 2019 04:15) (18 - 18)  SpO2: 98% (12 Oct 2019 04:15) (95% - 98%)  CAPILLARY BLOOD GLUCOSE        I&O's Summary    11 Oct 2019 07:01  -  12 Oct 2019 07:00  --------------------------------------------------------  IN: 0 mL / OUT: 1750 mL / NET: -1750 mL          PHYSICAL EXAM:  GENERAL: Writhing from severe back pain  HEAD: atraumatic, normocephalic  EYES: Conjunctiva and sclera clear  NECK: Supple, no lymphadenopathy, no JVD  CHEST/LUNG: CTAB; No wheezing, rales, or rhonchi  HEART: RRR; Normal S1/S2. No murmurs, rubs, or gallops  ABDOMEN: Soft, non-tender, non-distended; normal bowel sounds, no organomegaly  EXTREMITIES:  2+ peripheral pulses b/l, No clubbing, cyanosis, or edema  NEUROLOGY: A&O x3, no focal deficits  SKIN: Warm, dry, intact; No rashes or lesions      LABS:                        7.9    8.29  )-----------( 287      ( 12 Oct 2019 06:46 )             24.8     10-10    139  |  104  |  14  ----------------------------<  108<H>  4.1   |  26  |  0.69    Ca    8.5      10 Oct 2019 11:14  Phos  3.3     10-10  Mg     1.9     10-10      RADIOLOGY & ADDITIONAL TESTS:    Imaging Personally Reviewed:     Consultant(s) Notes Reviewed:     Care Discussed with Consultants/Other Providers:

## 2019-10-13 NOTE — PROGRESS NOTE ADULT - PROBLEM SELECTOR PLAN 3
Paraplegic/ self-catheterizes. L ureteral stent, hx of nephrolithiasis, and ESBL   - No dysuria  - Repeat UA with +leuk esterase, +nitrites, many bacteria and WBC 49.   - UCx growing >100,000 CFU/mL E. coli and Enterobacter aerogenes  - If symptomatic, develops fevers or is hemodynamically unstable will treat with Meropenem (sensitive in the past)

## 2019-10-13 NOTE — PROGRESS NOTE ADULT - SUBJECTIVE AND OBJECTIVE BOX
[facility] Ophthalmology Follow Up Note    Interval: Pt notes that her eye pain is the same, and the vision is the same    Mood and Affect Appropriate ( x ),  Oriented to Time, Place, and Person x 3 ( x )    Ophthalmology Exam:  Visual acuity (cc): 20/25 OD , HM only OS   Pupils: PERRL OD, OS surgical, no RAPD by reverse  Ttono: 16 OD 18 OS   Extraocular movements (EOMs): grossly full     Slit lamp Exam (SLE)  External: Flat OU  Lids/Lashes/Lacrimal Ducts: Flat OU  Sclera/Conjunctiva: W+Q OD, trace injection OS  Cornea: Cl OD,  OS: PK with stitches buried- no loose stitches; small epi defect around 7 oclock, diffuse K edema OS. 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 Pt is on pred forte QID OS and valtrex 1g QD as outpatient for suppression, Pt presented initially with worsened left eye pain, and was found to have epithelial defect of the left eye. Antibiotic therapy was initiated. Pt has demonstrate resolution of epithelial defect. Pt's exam findings were discussed with patient's outpt ophthalmologist, Dr. Blank who is aware that graft is failing.     - Prednisolone acetate 1% drops to BID in left eye  - continue Bacitracin ointment QID OS   - continue Ofloxacin QID OS   - Valtrex 1g BID  - findings and plan discussed with patient and primary team  - Pt's epi defect is not healing as expected, please emphasize proper placement of ointment and drops into the left eye.  Drops should go in before the ointment, and can wipe of excess ointment prior to placing drops into the eye (please wait 1 minute in between drops), and then put another set of ointment after drops are placed.   - Communicated to the primary team, who will relay the information to the nursing staff.       Outpatient follow-up: Patient should follow-up with his/her ophthalmologist or with [facility] Department of Ophthalmology- [place] within 1 week of after discharge at:  [address]  [place] [address]  [phone]    S/D/W Dr. Ignacio and D/W

## 2019-10-13 NOTE — PROGRESS NOTE ADULT - SUBJECTIVE AND OBJECTIVE BOX
Patient is a 68y old Female admitted for Melena (12 Oct 2019 10:56)      SUBJECTIVE / OVERNIGHT EVENTS:    REVIEW OF SYSTEMS:    CONSTITUTIONAL: No weakness, fevers or chills  EYES/ENT: No visual changes;  No vertigo or throat pain   NECK: No pain or stiffness  RESPIRATORY: No cough, wheezing, hemoptysis; No shortness of breath  CARDIOVASCULAR: No chest pain or palpitations  GASTROINTESTINAL: No abdominal or epigastric pain. No nausea, vomiting, or hematemesis; No diarrhea or constipation. No melena or hematochezia.  GENITOURINARY: No dysuria, frequency or hematuria  NEUROLOGICAL: No numbness or weakness  SKIN: No itching, burning, rashes, or lesions   All other review of systems is negative unless indicated above.    MEDICATIONS  (STANDING):  artificial tears (preservative free) Ophthalmic Solution 1 Drop(s) Both EYES two times a day  ascorbic acid 500 milliGRAM(s) Oral daily  atorvastatin 40 milliGRAM(s) Oral at bedtime  BACItracin   Ointment 1 Application(s) Topical four times a day  baclofen 20 milliGRAM(s) Oral two times a day  DULoxetine 30 milliGRAM(s) Oral two times a day  gabapentin 800 milliGRAM(s) Oral three times a day  multivitamin 1 Tablet(s) Oral daily  ofloxacin 0.3% Solution 1 Drop(s) Left EYE four times a day  pantoprazole  Injectable 40 milliGRAM(s) IV Push two times a day  prednisoLONE acetate 1% Suspension 1 Drop(s) Left EYE two times a day  valACYclovir 1000 milliGRAM(s) Oral two times a day    MEDICATIONS  (PRN):  acetaminophen   Tablet .. 650 milliGRAM(s) Oral every 6 hours PRN Mild Pain (1 - 3)  morphine  - Injectable 2 milliGRAM(s) IV Push every 6 hours PRN Severe Pain (7 - 10)  oxyCODONE    IR 10 milliGRAM(s) Oral every 6 hours PRN Moderate Pain (4-6) Severe Pain( 7-10)  zolpidem 5 milliGRAM(s) Oral at bedtime PRN Insomnia      Allergies    No Known Allergies    Intolerances        OBJECTIVE:    Vital Signs Last 24 Hrs  T(C): 36.7 (13 Oct 2019 05:34), Max: 37.4 (12 Oct 2019 20:43)  T(F): 98 (13 Oct 2019 05:34), Max: 99.4 (12 Oct 2019 20:43)  HR: 84 (13 Oct 2019 05:34) (84 - 120)  BP: 136/71 (13 Oct 2019 05:34) (132/68 - 163/90)  BP(mean): --  RR: 20 (13 Oct 2019 05:34) (18 - 20)  SpO2: 96% (13 Oct 2019 05:34) (93% - 98%)  CAPILLARY BLOOD GLUCOSE        I&O's Summary    12 Oct 2019 07:01  -  13 Oct 2019 07:00  --------------------------------------------------------  IN: 1380 mL / OUT: 2200 mL / NET: -820 mL          PHYSICAL EXAM:  GENERAL: No acute distress, well-developed  HEAD: atraumatic, normocephalic  EYES: EOMI, PERRLA, conjunctiva and sclera clear  NECK: Supple, no lymphadenopathy, no JVD  CHEST/LUNG: CTAB; No wheezing, rales, or rhonchi  HEART: RRR; Normal S1/S2. No murmurs, rubs, or gallops  ABDOMEN: Soft, non-tender, non-distended; normal bowel sounds, no organomegaly  EXTREMITIES:  2+ peripheral pulses b/l, No clubbing, cyanosis, or edema  NEUROLOGY: A&O x3, no focal deficits  SKIN: Warm, dry, intact; No rashes or lesions      LABS:                        7.9    10.04 )-----------( 331      ( 12 Oct 2019 21:07 )             25.3         Urinalysis Basic - ( 12 Oct 2019 17:22 )    Color: Yellow / Appearance: Slightly Turbid / S.019 / pH: x  Gluc: x / Ketone: Negative  / Bili: Negative / Urobili: Negative   Blood: x / Protein: 30 mg/dL / Nitrite: Positive   Leuk Esterase: Large / RBC: 14 /hpf /  /HPF   Sq Epi: x / Non Sq Epi: 0 /hpf / Bacteria: Many        RADIOLOGY & ADDITIONAL TESTS:    Imaging Personally Reviewed:     Consultant(s) Notes Reviewed:     Care Discussed with Consultants/Other Providers: Patient is a 68y old Female admitted for Melena (12 Oct 2019 10:56)    SUBJECTIVE / OVERNIGHT EVENTS:  No acute events overnight. Reports brown BMs. Currently denies back pain, dyspnea, chest pain, L eye pain, hematuria, hematochezia, and melena.    REVIEW OF SYSTEMS:    CONSTITUTIONAL: No weakness, fevers or chills  EYES/ENT: No visual changes;  No vertigo or throat pain   NECK: No pain or stiffness  RESPIRATORY: No cough, wheezing, hemoptysis; No shortness of breath  CARDIOVASCULAR: No chest pain or palpitations  GASTROINTESTINAL: No abdominal or epigastric pain. No nausea, vomiting, or hematemesis; No diarrhea or constipation. No melena or hematochezia.  GENITOURINARY: No dysuria, frequency or hematuria  NEUROLOGICAL: No numbness or weakness  SKIN: No itching, burning, rashes, or lesions   All other review of systems is negative unless indicated above.    MEDICATIONS  (STANDING):  artificial tears (preservative free) Ophthalmic Solution 1 Drop(s) Both EYES two times a day  ascorbic acid 500 milliGRAM(s) Oral daily  atorvastatin 40 milliGRAM(s) Oral at bedtime  BACItracin   Ointment 1 Application(s) Topical four times a day  baclofen 20 milliGRAM(s) Oral two times a day  DULoxetine 30 milliGRAM(s) Oral two times a day  gabapentin 800 milliGRAM(s) Oral three times a day  multivitamin 1 Tablet(s) Oral daily  ofloxacin 0.3% Solution 1 Drop(s) Left EYE four times a day  pantoprazole  Injectable 40 milliGRAM(s) IV Push two times a day  prednisoLONE acetate 1% Suspension 1 Drop(s) Left EYE two times a day  valACYclovir 1000 milliGRAM(s) Oral two times a day    MEDICATIONS  (PRN):  acetaminophen   Tablet .. 650 milliGRAM(s) Oral every 6 hours PRN Mild Pain (1 - 3)  morphine  - Injectable 2 milliGRAM(s) IV Push every 6 hours PRN Severe Pain (7 - 10)  oxyCODONE    IR 10 milliGRAM(s) Oral every 6 hours PRN Moderate Pain (4-6) Severe Pain( 7-10)  zolpidem 5 milliGRAM(s) Oral at bedtime PRN Insomnia      Allergies  No Known Allergies      OBJECTIVE:    Vital Signs Last 24 Hrs  T(C): 36.7 (13 Oct 2019 05:34), Max: 37.4 (12 Oct 2019 20:43)  T(F): 98 (13 Oct 2019 05:34), Max: 99.4 (12 Oct 2019 20:43)  HR: 84 (13 Oct 2019 05:34) (84 - 120)  BP: 136/71 (13 Oct 2019 05:34) (132/68 - 163/90)  BP(mean): --  RR: 20 (13 Oct 2019 05:34) (18 - 20)  SpO2: 96% (13 Oct 2019 05:34) (93% - 98%)    CAPILLARY BLOOD GLUCOSE    I&O's Summary    12 Oct 2019 07:01  -  13 Oct 2019 07:00  --------------------------------------------------------  IN: 1380 mL / OUT: 2200 mL / NET: -820 mL      PHYSICAL EXAM:  GENERAL: No acute distress, well-developed  HEAD: atraumatic, normocephalic  EYES: Conjunctiva and sclera clear  NECK: Supple, no lymphadenopathy, no JVD  CHEST/LUNG: CTAB; No wheezing, rales, or rhonchi  HEART: RRR; Normal S1/S2. No murmurs, rubs, or gallops  ABDOMEN: Soft, non-tender, non-distended; normal bowel sounds, no organomegaly  EXTREMITIES: 2+ peripheral pulses b/l, No clubbing, cyanosis, or edema  NEUROLOGY: A&O x3, no focal deficits  SKIN: Stage 2 pressure ulcer on back. Otherwise warm, dry, intact.      LABS:                        7.9    10.04 )-----------( 331      ( 12 Oct 2019 21:07 )             25.3         Urinalysis Basic - ( 12 Oct 2019 17:22 )    Color: Yellow / Appearance: Slightly Turbid / S.019 / pH: x  Gluc: x / Ketone: Negative  / Bili: Negative / Urobili: Negative   Blood: x / Protein: 30 mg/dL / Nitrite: Positive   Leuk Esterase: Large / RBC: 14 /hpf /  /HPF   Sq Epi: x / Non Sq Epi: 0 /hpf / Bacteria: Many        RADIOLOGY & ADDITIONAL TESTS:    Imaging Personally Reviewed:     Consultant(s) Notes Reviewed:     Care Discussed with Consultants/Other Providers:

## 2019-10-13 NOTE — PROGRESS NOTE ADULT - PROBLEM SELECTOR PLAN 5
Uses baclofen pump and spinal stimulator, per  has not been helpful.  - Pump last refilled 9/18/19 in MICU, next refill due by 12/1/19  - Hypotensive earlier held off on pain medication  - Oxycodone 10mg q4h PRN for moderate to severe pain   - Continue Duloxetine 30mg BID  - Continue Gabapentin 800mg TID  - Continue Baclofen 20mg BID   - ISTOP in the chart Uses baclofen pump and spinal stimulator, per  has not been helpful.  - Pump last refilled 9/18/19 in MICU, next refill due by 12/1/19  - Hypotensive earlier held off on pain medication  - Oxycodone 10mg q4h PRN for moderate to severe pain   - Continue Duloxetine 30mg BID  - Continue Gabapentin 800mg TID  - Continue Baclofen 20mg BID   - ISTOP in the chart  - Chronic Pain consulted, will f/u recs Uses baclofen pump and spinal stimulator, per  has not been helpful.  - Pump last refilled 9/18/19 in MICU, next refill due by 12/1/19  -follows with Dr. Vieira pain management opt/ Dr. Gonzales for baclofen pump  - Hypotensive earlier held off on pain medication  - Oxycodone 10mg q4h PRN for moderate to severe pain   - Continue Duloxetine 30mg BID  - Continue Gabapentin 800mg TID  - Continue Baclofen 20mg BID   - ISTOP in the chart  - Chronic Pain consulted, will f/u recs

## 2019-10-13 NOTE — PROGRESS NOTE ADULT - PROBLEM SELECTOR PLAN 1
Acute blood loss anemia concern for UGI bleed, now improved.  - No source of bleeding identified on urgent EGD 10/7 and pill endoscopy 10/8  - PPI 40 IV BID  - CBC q12h  - Maintain active type and screen, transfuse with Hgb < 7  - Vascular consulted and have low suspicion for aortoenteric fistula   - GI Dr. Arteaga following, appreciate recs Acute blood loss anemia concern for UGI bleed  - No source of bleeding identified on urgent EGD 10/7 and pill endoscopy 10/8  - PPI 40 IV BID  - CBC q12h  - Maintain active type and screen, transfuse with Hgb < 7  - Vascular consulted and have low suspicion for aortoenteric fistula   - GI Dr. Arteaga following, appreciate recs

## 2019-10-13 NOTE — PROGRESS NOTE ADULT - PROBLEM SELECTOR PLAN 7
DVT ppx: IMPROVE 2, AC contraindicated given GIB, SCDs  Diet: Regular  Dispo: Will speak to Dr. Ambrocio regarding H/H.    Leonidas Arechiga MD  Internal Medicine PGY-1  [phone] / [phone]

## 2019-10-14 NOTE — PROGRESS NOTE ADULT - SUBJECTIVE AND OBJECTIVE BOX
[facility] Ophthalmology Follow Up Note    Interval: Pt notes that her eye pain is the same, perhaps a bit improved from yesterday, no change in Visual acuity     Mood and Affect Appropriate ( x ),  Oriented to Time, Place, and Person x 3 ( x )    Ophthalmology Exam:  Visual acuity (cc): 20/25 OD , HM only OS   Pupils: PERRL OD, OS surgical, no RAPD by reverse  Ttono: 17 OU   Extraocular movements (EOMs): grossly full     Slit lamp Exam (SLE)  External: Flat OU  Lids/Lashes/Lacrimal Ducts: Flat OU  Sclera/Conjunctiva: W+Q OD, trace injection OS  Cornea: Cl OD,  OS: PK with stitches buried- no loose stitches; epi defect around 7 oclock approximately 3 mm vertically x 4 mm horizontally , diffuse K edema OS. 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 Pt is on pred forte QID OS and valtrex 1g QD as outpatient for suppression, Pt presented initially with worsened left eye pain, and was found to have epithelial defect of the left eye. Antibiotic therapy was initiated. Pt has demonstrate resolution of epithelial defect. Pt's exam findings were discussed with patient's outpt ophthalmologist, Dr. Blank who is aware that graft is failing.     - Prednisolone acetate 1% drops to BID in left eye  - continue Bacitracin ointment QID OS   - continue Ofloxacin QID OS   - Valtrex 1g BID  - findings and plan discussed with patient and primary team  - Pt's epi defect is not healing as expected, please emphasize proper placement of ointment and drops into the left eye. Drops should go in before the ointment, and can wipe of excess ointment prior to placing drops into the eye (please wait 1 minute in between drops), and then put another set of ointment after drops are placed.       Outpatient follow-up: Patient should follow-up with his/her ophthalmologist or with [facility] Department of Ophthalmology- [place] within 1 week of after discharge at:  [address]  [place] [address]  [phone]

## 2019-10-14 NOTE — PROGRESS NOTE ADULT - PROBLEM SELECTOR PLAN 6
- c/w Valacyclovir 1000mg BID  - c/w prednisolone 1% drops BID L eye  - c/w bacitracin ointment QID L eye  - c/w Ofloxacin QID L eye  - Ophtho following, appreciate recs -Per , ureteral stent placed by Dr. Mayorga outpatient  -Will f/u regarding removal or replacement as per d/w

## 2019-10-14 NOTE — PROGRESS NOTE ADULT - SUBJECTIVE AND OBJECTIVE BOX
Pre-Endoscopy Evaluation      Referring Physician: dr. enriquez                                    Procedure:  upper gastrointestinal endoscopy     Indication for Procedure: gib    Pertinent History: 68y female h/o aortic dissection repair, spinal cord hematoma (now functionally paraplegic), chronic spasticity + pain (on Oxycodone and Baclofen pump), HTN, nephrolithiasis s/p left urethral stent, UTIs and endotheliitis/keratitis (post-corneal transplant), GIB S/P  EGD and VCE with no bleeding source identified now with recurrent melenic stools      Sedation by Anesthesia [x]    PAST MEDICAL & SURGICAL HISTORY:  Subdural hematoma, nontraumatic: spontaneous  Dorsalgia of lumbar region: on pain medication /baclofen po and pump  Self-catheterizes urinary bladder  Anemia: chronic  Uveitis  Osteoporosis  PAD (peripheral artery disease)  Hematoma: spinal  September  treated  September 2018  Paraplegia: on wheelchair goes to physical therapy 2 x weekly  Aortic dissection, thoracic: Type A Repaired 2009  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2018  UTI (urinary tract infection): stable x 3 months  TIA (transient ischemic attack)  HTN (Hypertension): on meds  History of corneal transplant: left corneal transplant on 5/21/2018  Disorder of spine: unthetethering 2 x  Presence of IVC filter: 2014 ?  S/P aortic dissection repair: Type A Dissection repair /2009   descending aortic aneurysm repair 9/2016  H/O Spinal surgery: laminectomies 2014      PMH of Gastroparesis [ ]  Gastric Surgery [ ]  Gastric Outlet Obstruction [ ]    Allergies    No Known Allergies    Intolerances:    Latex allergy: [ ] yes [x] no    Medications:MEDICATIONS  (STANDING):  artificial tears (preservative free) Ophthalmic Solution 1 Drop(s) Both EYES two times a day  ascorbic acid 500 milliGRAM(s) Oral daily  atorvastatin 40 milliGRAM(s) Oral at bedtime  BACItracin   Ointment 1 Application(s) Topical four times a day  baclofen 20 milliGRAM(s) Oral two times a day  DULoxetine 30 milliGRAM(s) Oral two times a day  gabapentin 800 milliGRAM(s) Oral three times a day  multivitamin 1 Tablet(s) Oral daily  ofloxacin 0.3% Solution 1 Drop(s) Left EYE four times a day  pantoprazole  Injectable 40 milliGRAM(s) IV Push two times a day  prednisoLONE acetate 1% Suspension 1 Drop(s) Left EYE two times a day  valACYclovir 1000 milliGRAM(s) Oral two times a day    MEDICATIONS  (PRN):  acetaminophen   Tablet .. 650 milliGRAM(s) Oral every 6 hours PRN Mild Pain (1 - 3)  morphine  - Injectable 2 milliGRAM(s) IV Push every 4 hours PRN Severe Pain (7 - 10)  oxyCODONE    IR 10 milliGRAM(s) Oral every 6 hours PRN Moderate Pain (4-6) Severe Pain( 7-10)  simethicone 80 milliGRAM(s) Chew every 6 hours PRN Gas  zolpidem 5 milliGRAM(s) Oral at bedtime PRN Insomnia      Smoking: [ ] yes  [x] no    AICD/PPM: [ ] yes   [x] no    Pertinent lab data:                        7.5    13.05 )-----------( 288      ( 14 Oct 2019 12:03 )             23.0     10-14    139  |  107  |  27<H>  ----------------------------<  129<H>  4.0   |  24  |  0.77    Ca    8.0<L>      14 Oct 2019 03:43  Phos  3.0     10-14  Mg     1.7     10-14    TPro  4.4<L>  /  Alb  2.2<L>  /  TBili  0.2  /  DBili  x   /  AST  10  /  ALT  8<L>  /  AlkPhos  60  10-14    PT/INR - ( 14 Oct 2019 03:43 )   PT: 12.9 sec;   INR: 1.13 ratio         PTT - ( 14 Oct 2019 03:43 )  PTT:32.2 sec    < from: Transthoracic Echocardiogram (08.19.19 @ 11:16) >     Summary     Fibrocalcific changes noted to the mitral valve leaflets with preserved   leaflet excursion.   EA reversal of the mitral inflow consistent with reduced compliance of   the   left ventricle.   Mild (1+) mitral regurgitation is present.   The aortic valve is not well visualized, appears mildly calcified. Valve   opening seems to be normal.   Mild (1+) aortic regurgitation is present.   Normal appearing tricuspid valve structure.   Mild to Moderate Tricuspid regurgitation is present.   Pulmonic valve not well seen.   The left atrium is moderately dilated.   Estimated left ventricular ejection fraction is 60-65 %.   The left ventricle is normal in size, wall thickness, wall motion and   contractility as seen in limited views.   The right atrium appears moderately dilated.   The right ventricle is mildly dilated.   Severe dilatation of the aortic root (4.6 cm).   Type A aortic dissection 5/2009 s/p repair,s/p descending aortic aneurysm   repair 09/2016.   IVC was not seen within the subcostal view.  --------------------------------------------------------        Physical Examination:      Daily   Vital Signs Last 24 Hrs  T(C): 37.1 (14 Oct 2019 13:26), Max: 37.1 (13 Oct 2019 20:42)  T(F): 98.8 (14 Oct 2019 13:26), Max: 98.8 (13 Oct 2019 20:42)  HR: 93 (14 Oct 2019 13:26) (93 - 142)  BP: 113/68 (14 Oct 2019 13:26) (98/64 - 134/84)  BP(mean): --  RR: 18 (14 Oct 2019 13:26) (18 - 22)  SpO2: 95% (14 Oct 2019 13:26) (95% - 99%)        Constitutional: NAD     Neck:  No JVD    Respiratory: CTAB/L    Cardiovascular: S1 and S2    Gastrointestinal: BS+, soft, NT/ND    Extremities: No peripheral edema    Neurological: A/O x 3    : No Russo    Skin: No rashes    Comments: NPO after 0900- patient had solids for breakfast    The patient is a suitable candidate for the planned procedure unless box checked [ ]  No, explain:

## 2019-10-14 NOTE — PROGRESS NOTE ADULT - SUBJECTIVE AND OBJECTIVE BOX
Brief note:    EGD done:  Retained food but no active bleed    Plan:    If H/H stable in AM, consider discharge, for outpatient follow.  Resume prior diet.    Donell Ambrocio MD

## 2019-10-14 NOTE — PROGRESS NOTE ADULT - SUBJECTIVE AND OBJECTIVE BOX
Jac Jose MD  Internal Medicine, PGY1  [phone]    Progress Note    Brief history:  68F h/o aortic dissection (post-repair several years prior), spinal cord hematoma (now functionally paraplegic), chronic spasticity + pain (on Oxycodone and Baclofen pump), HTN, nephrolithiasis s/p left urethral stent, UTIs and endotheliitis/keratitis (post-corneal transplant) admitted for melena. No source of bleeding identified on EGD or pill endoscopy. Vascular consulted for possible aorteenteric fistula given h/o aortic surgery but low suspicion. CTA AP negative for source of bleed. CTA Chest not performed.     Interval events: , SBP 80s/50s, SaO2 94%, T97.6 on room air with recent episode of melena. Patient lethargic but alert and responsive to questions. 500cc NS bolus with 1u pRBC given with improvement of SBP to 100s, HR 120s-130s. Labs sent at 11PM was sent by pneumatic tube but never received by lab. Labs were repeated and sent at midnight while prbc already being given. CTA was ordered as instructed and performed to evaluate for bleed and was negative. Vascular had suggested possible CTA chest to evaluate for aortoesophageal fistula, but only performed CTA AP.     ROS positive for:     OBJECTIVE:    10-13 @ 07:01  -  10-14 @ 07:00  --------------------------------------------------------  IN: 1810 mL / OUT: 1500 mL / NET: 310 mL      CAPILLARY BLOOD GLUCOSE      POCT Blood Glucose.: 147 mg/dL (13 Oct 2019 22:48)        VITALS:  T(F): 98 (10-14-19 @ 04:24), Max: 98.8 (10-13-19 @ 20:42)  HR: 99 (10-14-19 @ 04:24) (99 - 142)  BP: 118/72 (10-14-19 @ 04:24) (98/64 - 134/64)  RR: 18 (10-14-19 @ 04:24) (18 - 22)  SpO2: 95% (10-14-19 @ 04:24) (95% - 98%)    PHYSICAL EXAM:  GENERAL: No acute distress, well-developed  HEAD: atraumatic, normocephalic  EYES: Conjunctiva and sclera clear  NECK: Supple, no lymphadenopathy, no JVD  CHEST/LUNG: CTAB; No wheezing, rales, or rhonchi  HEART: RRR; Normal S1/S2. No murmurs, rubs, or gallops  ABDOMEN: Soft, non-tender, non-distended; normal bowel sounds, no organomegaly  EXTREMITIES: 2+ peripheral pulses b/l, No clubbing, cyanosis, or edema  NEUROLOGY: A&O x3, no focal deficits  SKIN: Stage 2 pressure ulcer on back. Otherwise warm, dry, intact.    HOSPITAL MEDICATIONS:  Standing Meds:  artificial tears (preservative free) Ophthalmic Solution 1 Drop(s) Both EYES two times a day  ascorbic acid 500 milliGRAM(s) Oral daily  atorvastatin 40 milliGRAM(s) Oral at bedtime  BACItracin   Ointment 1 Application(s) Topical four times a day  baclofen 20 milliGRAM(s) Oral two times a day  DULoxetine 30 milliGRAM(s) Oral two times a day  gabapentin 800 milliGRAM(s) Oral three times a day  meropenem  IVPB 1000 milliGRAM(s) IV Intermittent every 8 hours  multivitamin 1 Tablet(s) Oral daily  ofloxacin 0.3% Solution 1 Drop(s) Left EYE four times a day  pantoprazole  Injectable 40 milliGRAM(s) IV Push two times a day  prednisoLONE acetate 1% Suspension 1 Drop(s) Left EYE two times a day  valACYclovir 1000 milliGRAM(s) Oral two times a day      PRN Meds:  acetaminophen   Tablet .. 650 milliGRAM(s) Oral every 6 hours PRN  morphine  - Injectable 2 milliGRAM(s) IV Push every 6 hours PRN  oxyCODONE    IR 10 milliGRAM(s) Oral every 6 hours PRN  simethicone 80 milliGRAM(s) Chew every 6 hours PRN  zolpidem 5 milliGRAM(s) Oral at bedtime PRN      LABS:                        7.2    11.88 )-----------( 269      ( 14 Oct 2019 04:23 )             22.2     Hgb trend: 7.2 <-- , 7.1 <-- , 7.5 <-- , 7.9 <-- , 7.9 <-- , 8.4 <--   WBC trend: 11.88 <-- , 17.18 <-- , 9.39 <-- , 10.04 <-- , 8.29 <-- , 7.94 <--     CMP 10-14-19 @ 03:43    139  |  107  |  27<H>  ----------------------------<  129<H>  4.0   |  24  |  0.77    Ca    8.0<L>      10-14-19 @ 03:43  Phos  3.0     10-14  Mg     1.7     10-14    TPro  4.4<L>  /  Alb  2.2<L>  /  TBili  0.2  /  DBili  x   /  AST  10  /  ALT  8<L>  /  AlkPhos  60  10-14    Serum Cr trend: 0.77 <-- , 0.99 <--   PT/INR - ( 14 Oct 2019 03:43 )   PT: 12.9 sec;   INR: 1.13 ratio         PTT - ( 14 Oct 2019 03:43 )  PTT:32.2 sec  Urinalysis Basic - ( 12 Oct 2019 17:22 )    Color: Yellow / Appearance: Slightly Turbid / S.019 / pH: x  Gluc: x / Ketone: Negative  / Bili: Negative / Urobili: Negative   Blood: x / Protein: 30 mg/dL / Nitrite: Positive   Leuk Esterase: Large / RBC: 14 /hpf /  /HPF   Sq Epi: x / Non Sq Epi: 0 /hpf / Bacteria: Many            MICROBIOLOGY:       RADIOLOGY:  [ ] Reviewed and interpreted by me    EKG: Jca Jose MD  Internal Medicine, PGY1  [phone]    Progress Note    Brief history:  68F h/o aortic dissection (post-repair several years prior), spinal cord hematoma (now functionally paraplegic), chronic spasticity + pain (on Oxycodone and Baclofen pump), HTN, nephrolithiasis s/p left urethral stent, UTIs and endotheliitis/keratitis (post-corneal transplant) admitted for melena. No source of bleeding identified on EGD or pill endoscopy. Vascular consulted for possible aorteenteric fistula given h/o aortic surgery but low suspicion. CTA AP negative for source of bleed. CTA Chest not performed.     Interval events: , SBP 80s/50s, SaO2 94%, T97.6 on room air with recent episode of melena. Patient lethargic but alert and responsive to questions. 500cc NS bolus with 1u pRBC given with improvement of SBP to 100s, HR 120s-130s. Labs sent at 11PM was sent by pneumatic tube but never received by lab. Labs were repeated and sent at midnight while prbc already being given. CTA was ordered as instructed and performed to evaluate for bleed and was negative. Vascular had suggested possible CTA chest to evaluate for aortoesophageal fistula, but only performed CTA AP. Today, patient complaining of increased pain. Pain management consulted. Hgb stable. EGD later today.     OBJECTIVE:    10-13 @ 07:  -  10-14 @ 07:00  --------------------------------------------------------  IN: 1810 mL / OUT: 1500 mL / NET: 310 mL      CAPILLARY BLOOD GLUCOSE      POCT Blood Glucose.: 147 mg/dL (13 Oct 2019 22:48)        VITALS:  T(F): 98 (10-14-19 @ 04:24), Max: 98.8 (10-13-19 @ 20:42)  HR: 99 (10-14-19 @ 04:24) (99 - 142)  BP: 118/72 (10-14-19 @ 04:24) (98/64 - 134/64)  RR: 18 (10-14-19 @ 04:24) (18 - 22)  SpO2: 95% (10-14-19 @ 04:24) (95% - 98%)    PHYSICAL EXAM:  GENERAL: No acute distress, well-developed  HEAD: atraumatic, normocephalic  EYES: Conjunctiva and sclera clear  NECK: Supple, no lymphadenopathy, no JVD  CHEST/LUNG: CTAB; No wheezing, rales, or rhonchi  HEART: RRR; Normal S1/S2. No murmurs, rubs, or gallops  ABDOMEN: Soft, non-tender, non-distended; normal bowel sounds, no organomegaly  EXTREMITIES: 2+ peripheral pulses b/l, No clubbing, cyanosis, or edema  NEUROLOGY: A&O x3, no focal deficits  SKIN: Stage 2 pressure ulcer on back. Otherwise warm, dry, intact.    HOSPITAL MEDICATIONS:  Standing Meds:  artificial tears (preservative free) Ophthalmic Solution 1 Drop(s) Both EYES two times a day  ascorbic acid 500 milliGRAM(s) Oral daily  atorvastatin 40 milliGRAM(s) Oral at bedtime  BACItracin   Ointment 1 Application(s) Topical four times a day  baclofen 20 milliGRAM(s) Oral two times a day  DULoxetine 30 milliGRAM(s) Oral two times a day  gabapentin 800 milliGRAM(s) Oral three times a day  meropenem  IVPB 1000 milliGRAM(s) IV Intermittent every 8 hours  multivitamin 1 Tablet(s) Oral daily  ofloxacin 0.3% Solution 1 Drop(s) Left EYE four times a day  pantoprazole  Injectable 40 milliGRAM(s) IV Push two times a day  prednisoLONE acetate 1% Suspension 1 Drop(s) Left EYE two times a day  valACYclovir 1000 milliGRAM(s) Oral two times a day      PRN Meds:  acetaminophen   Tablet .. 650 milliGRAM(s) Oral every 6 hours PRN  morphine  - Injectable 2 milliGRAM(s) IV Push every 6 hours PRN  oxyCODONE    IR 10 milliGRAM(s) Oral every 6 hours PRN  simethicone 80 milliGRAM(s) Chew every 6 hours PRN  zolpidem 5 milliGRAM(s) Oral at bedtime PRN      LABS:                        7.2    11.88 )-----------( 269      ( 14 Oct 2019 04:23 )             22.2     Hgb trend: 7.2 <-- , 7.1 <-- , 7.5 <-- , 7.9 <-- , 7.9 <-- , 8.4 <--   WBC trend: 11.88 <-- , 17.18 <-- , 9.39 <-- , 10.04 <-- , 8.29 <-- , 7.94 <--     CMP 10-14-19 @ 03:43    139  |  107  |  27<H>  ----------------------------<  129<H>  4.0   |  24  |  0.77    Ca    8.0<L>      10-14-19 @ 03:43  Phos  3.0     10-14  Mg     1.7     10-14    TPro  4.4<L>  /  Alb  2.2<L>  /  TBili  0.2  /  DBili  x   /  AST  10  /  ALT  8<L>  /  AlkPhos  60  10-14    Serum Cr trend: 0.77 <-- , 0.99 <--   PT/INR - ( 14 Oct 2019 03:43 )   PT: 12.9 sec;   INR: 1.13 ratio         PTT - ( 14 Oct 2019 03:43 )  PTT:32.2 sec  Urinalysis Basic - ( 12 Oct 2019 17:22 )    Color: Yellow / Appearance: Slightly Turbid / S.019 / pH: x  Gluc: x / Ketone: Negative  / Bili: Negative / Urobili: Negative   Blood: x / Protein: 30 mg/dL / Nitrite: Positive   Leuk Esterase: Large / RBC: 14 /hpf /  /HPF   Sq Epi: x / Non Sq Epi: 0 /hpf / Bacteria: Many            MICROBIOLOGY:       RADIOLOGY:  [ ] Reviewed and interpreted by me    EKG:

## 2019-10-14 NOTE — PROGRESS NOTE ADULT - PROBLEM SELECTOR PLAN 5
Uses baclofen pump and spinal stimulator, per  has not been helpful.  - Pump last refilled 9/18/19 in MICU, next refill due by 12/1/19  -follows with Dr. Vieira pain management opt/ Dr. Gonzales for baclofen pump  - Hypotensive earlier held off on pain medication  - Oxycodone 10mg q4h PRN for moderate to severe pain   - Continue Duloxetine 30mg BID  - Continue Gabapentin 800mg TID  - Continue Baclofen 20mg BID   - ISTOP in the chart  - Chronic Pain consulted, will f/u recs Uses baclofen pump and spinal stimulator, per  has not been helpful.  - Pump last refilled 9/18/19 in MICU, next refill due by 12/1/19  -follows with Dr. Vieira pain management opt/ Dr. Gonzales for baclofen pump  - Hypotensive earlier held off on pain medication  - Oxycodone 10mg q4h PRN for moderate to severe pain   - Morphine 2mg IV q4h PRN for breakthrough  - Continue Duloxetine 30mg BID  - Continue Gabapentin 800mg TID  - Continue Baclofen 20mg BID   - ISTOP in the chart  - Chronic Pain consulted, will f/u recs

## 2019-10-14 NOTE — PROGRESS NOTE ADULT - SUBJECTIVE AND OBJECTIVE BOX
CHIEF COMPLAINT:  Patient is a 68y old  Female who presents with a chief complaint of Melena (14 Oct 2019 13:05)    Interval HPI:   68F with PMHx of aortic dissection repair, functional paraplegia 2/2 spinal cord hematoma, chronic spasticity and pain, Morphine/Baclofen pump, Spinal cord stimulator implanted but not in use, HTN, nephrolithiasis, left urethral stent and endotheliitis/keratitis (post-corneal transplant) presenting with black tarry stools.   Has had multiple recent hospitalizations with multiple EGDs but source of bleed not identified. Suspicion for Dieulafoy.    Community pain management specialist Dr Vieira in [place].  Medtronic 40 cc intrathecal pump contains:  Lioresal 2000 mcg/ ml at 480 mcg/ day  Morphine 10 mg/ ml at 2.4 mg/ day    Pump last refilled while in MICU on 9/18, to be refilled before 12/1/2019.    [facility] Reference #[id number]    Pt in bed, NAD, well-groomed, well-developed, no signs of toxicity. Alert & Oriented X3, Good concentration. C/O chronic low back pain radiating to B/L LE. Worse than her usual pain at home.      T(C): 37.1 (10-14-19 @ 13:26)  HR: 93 (10-14-19 @ 13:26)  BP: 113/68 (10-14-19 @ 13:26)  RR: 18 (10-14-19 @ 13:26)  SpO2: 95% (10-14-19 @ 13:26)    Current in-patient pain therapy:  MEDICATIONS  (STANDING):  artificial tears (preservative free) Ophthalmic Solution 1 Drop(s) Both EYES two times a day  ascorbic acid 500 milliGRAM(s) Oral daily  atorvastatin 40 milliGRAM(s) Oral at bedtime  BACItracin   Ointment 1 Application(s) Topical four times a day  baclofen 20 milliGRAM(s) Oral two times a day  DULoxetine 30 milliGRAM(s) Oral two times a day  gabapentin 800 milliGRAM(s) Oral three times a day  multivitamin 1 Tablet(s) Oral daily  ofloxacin 0.3% Solution 1 Drop(s) Left EYE four times a day  pantoprazole  Injectable 40 milliGRAM(s) IV Push two times a day  prednisoLONE acetate 1% Suspension 1 Drop(s) Left EYE two times a day  valACYclovir 1000 milliGRAM(s) Oral two times a day    MEDICATIONS  (PRN):  acetaminophen   Tablet .. 650 milliGRAM(s) Oral every 6 hours PRN Mild Pain (1 - 3)  morphine  - Injectable 2 milliGRAM(s) IV Push every 6 hours PRN Severe Pain (7 - 10)  oxyCODONE    IR 10 milliGRAM(s) Oral every 6 hours PRN Moderate Pain (4-6) Severe Pain( 7-10)  simethicone 80 milliGRAM(s) Chew every 6 hours PRN Gas  zolpidem 5 milliGRAM(s) Oral at bedtime PRN Insomnia      Allergies    No Known Allergies    Intolerances    Pertinent labs, radiology, additional studies:  AM labs reviewed CHIEF COMPLAINT:  Patient is a 68y old  Female who presents with a chief complaint of Melena (14 Oct 2019 13:05)    Interval HPI:   68F with PMHx of aortic dissection repair, functional paraplegia 2/2 spinal cord hematoma, chronic spasticity and pain, Morphine/Baclofen pump, Spinal cord stimulator implanted but not in use, HTN, nephrolithiasis, left urethral stent and endotheliitis/keratitis (post-corneal transplant) presenting with black tarry stools.   Has had multiple recent hospitalizations with multiple EGDs but source of bleed not identified. Suspicion for Dieulafoy.  Pt is known to our service from last admission.    Community pain management specialist Dr Vieira in [place].  Medtronic 40 cc intrathecal pump contains:  Lioresal 2000 mcg/ ml at 480 mcg/ day  Morphine 10 mg/ ml at 2.4 mg/ day    Pump last refilled while in MICU on 9/18, to be refilled before 12/1/2019.    [facility] Reference #[id number]    Pt in bed, NAD, well-groomed, well-developed, no signs of toxicity. Alert & Oriented X3, Good concentration. C/O chronic low back pain radiating to B/L LE. Worse than her usual pain at home.      T(C): 37.1 (10-14-19 @ 13:26)  HR: 93 (10-14-19 @ 13:26)  BP: 113/68 (10-14-19 @ 13:26)  RR: 18 (10-14-19 @ 13:26)  SpO2: 95% (10-14-19 @ 13:26)    Current in-patient pain therapy:  MEDICATIONS  (STANDING):  artificial tears (preservative free) Ophthalmic Solution 1 Drop(s) Both EYES two times a day  ascorbic acid 500 milliGRAM(s) Oral daily  atorvastatin 40 milliGRAM(s) Oral at bedtime  BACItracin   Ointment 1 Application(s) Topical four times a day  baclofen 20 milliGRAM(s) Oral two times a day  DULoxetine 30 milliGRAM(s) Oral two times a day  gabapentin 800 milliGRAM(s) Oral three times a day  multivitamin 1 Tablet(s) Oral daily  ofloxacin 0.3% Solution 1 Drop(s) Left EYE four times a day  pantoprazole  Injectable 40 milliGRAM(s) IV Push two times a day  prednisoLONE acetate 1% Suspension 1 Drop(s) Left EYE two times a day  valACYclovir 1000 milliGRAM(s) Oral two times a day    MEDICATIONS  (PRN):  acetaminophen   Tablet .. 650 milliGRAM(s) Oral every 6 hours PRN Mild Pain (1 - 3)  morphine  - Injectable 2 milliGRAM(s) IV Push every 6 hours PRN Severe Pain (7 - 10)  oxyCODONE    IR 10 milliGRAM(s) Oral every 6 hours PRN Moderate Pain (4-6) Severe Pain( 7-10)  simethicone 80 milliGRAM(s) Chew every 6 hours PRN Gas  zolpidem 5 milliGRAM(s) Oral at bedtime PRN Insomnia      Allergies    No Known Allergies    Intolerances    Pertinent labs, radiology, additional studies:  AM labs reviewed

## 2019-10-14 NOTE — PROGRESS NOTE ADULT - PROBLEM SELECTOR PLAN 1
Acute blood loss anemia concern for UGI bleed  - No source of bleeding identified on urgent EGD 10/7 and pill endoscopy 10/8  - PPI 40 IV BID  - CBC q12h  - Maintain active type and screen, transfuse with Hgb < 7  - Vascular consulted and have low suspicion for aortoenteric fistula   - GI Dr. Arteaga following, appreciate recs Acute blood loss anemia concern for UGI bleed  - No source of bleeding identified on urgent EGD 10/7 and pill endoscopy 10/8  - Repeat EGD today given melena overnight  - PPI 40 IV BID  - CBC q12h  - Maintain active type and screen, transfuse with Hgb < 7  - Vascular consulted and have low suspicion for aortoenteric fistula   - GI Dr. Arteaga following, appreciate recs

## 2019-10-14 NOTE — CHART NOTE - NSCHARTNOTEFT_GEN_A_CORE
Called with critical VS of , SBP 80s/50s, SaO2 94% on room air with recent episode of melena. Patient lethargic but alert and responsive to questions. 500cc NS bolus with 1u pRBC given with improvement of hemodynamics SBP 100s, HR 130s. CTA to evaluate for source of bleed ordered stat and will obtain when patient stable. Called with critical VS of , SBP 80s/50s, SaO2 94%, T97.6 on room air with recent episode of melena. Patient lethargic but alert and responsive to questions. 500cc NS bolus with 1u pRBC given with improvement of SBP to 100s, HR 120s-130s. CTA to evaluate for source of bleed ordered stat and will obtain when patient stable. Pt had recent positive urine culture. As per progress note, patient was also started on meropenem as previous cx was sensitive to drew. Called with critical VS of , SBP 80s/50s, SaO2 94%, T97.6 on room air with recent episode of melena. Patient lethargic but alert and responsive to questions. 500cc NS bolus with 1u pRBC given with improvement of SBP to 100s, HR 120s-130s. Labs sent at 11PM was sent by pneumatic tube but never received by lab. Labs were repeated and sent at midnight while prbc already being given. CTA was ordered and performed to evaluate for bleed and was negative. Pt had recent positive urine culture. As per progress note, patient was also started on meropenem as previous cx was sensitive to drew.

## 2019-10-14 NOTE — PROGRESS NOTE ADULT - ASSESSMENT
68F with PMHx of aortic dissection repair, functional paraplegia 2/2 spinal cord hematoma, chronic spasticity and pain, Morphine/Baclofen pump, Spinal cord stimulator implanted but not in use, HTN, nephrolithiasis, left urethral stent and endotheliitis/keratitis (post-corneal transplant) presenting with black tarry stools.   Has had multiple recent hospitalizations with multiple EGDs but source of bleed not identified. Suspicion for Dieulafoy.    Pt with Morphine/Baclofen pump as detailed above  Continue current regimen of PO Baclofen, Oxycodone, Duloxetine, Gabapentin  Continue Morphine IV 2 mg PRN severe pain, can increase frequency to every 4 hours  Consider adding lidoderm  Follow up with Dr. Vieira upon discharge for pain management, pump to be refilled before 12/1/2019  Pt to discuss peripheral nerve block for Meralgia Paresthetica with Dr. Vieira    Will follow    Minutes spent on encounter:  15 minutes.      [facility]  [phone]

## 2019-10-14 NOTE — PROGRESS NOTE ADULT - ASSESSMENT
In summary, UGI bleed unclear origin.  Suspect bleed again.  CT negative    REC:    Keep NPO  EGD today  Monitor H/H.  if negative EGD and stable H/H consider D/C tomorrow.      Donell Ambrocio MD

## 2019-10-15 NOTE — PROGRESS NOTE ADULT - PROBLEM SELECTOR PLAN 6
-Per , ureteral stent placed by Dr. Mayorga outpatient  -Will f/u regarding removal or replacement as per d/w , although likely will not happen inpatient  -Outpatient f/u - c/w Valacyclovir 1000mg BID  - c/w prednisolone 1% drops BID L eye  - c/w bacitracin ointment QID L eye  - c/w Ofloxacin QID L eye  - Ophtho following, appreciate recs  - Outpatient ophtho f/u

## 2019-10-15 NOTE — PROGRESS NOTE ADULT - PROBLEM SELECTOR PLAN 7
- c/w Valacyclovir 1000mg BID  - c/w prednisolone 1% drops BID L eye  - c/w bacitracin ointment QID L eye  - c/w Ofloxacin QID L eye  - Ophtho following, appreciate recs  - Outpatient ophtho f/u DVT ppx: IMPROVE 2, AC contraindicated given GIB, SCDs  Diet: Regular  Dispo: Stable for d/c as per GI Dr. Ambrocio

## 2019-10-15 NOTE — PROGRESS NOTE ADULT - PROBLEM SELECTOR PLAN 5
Uses baclofen pump and spinal stimulator, per  has not been helpful.  - Pump last refilled 9/18/19 in MICU, next refill due by 12/1/19  -follows with Dr. Vieira pain management opt/ Dr. Gonzales for baclofen pump  - Hypotensive earlier held off on pain medication  - Oxycodone 10mg q4h PRN for moderate to severe pain   - Morphine 2mg IV q4h PRN for breakthrough  - Continue Duloxetine 30mg BID  - Continue Gabapentin 800mg TID  - Continue Baclofen 20mg BID   - ISTOP in the chart  - Chronic Pain consulted, will f/u recs -Per , ureteral stent placed by Dr. Mayorga outpatient  -Will f/u regarding removal or replacement as per d/w , although likely will not happen inpatient  -Outpatient f/u

## 2019-10-15 NOTE — PROGRESS NOTE ADULT - SUBJECTIVE AND OBJECTIVE BOX
Jac Jose MD  Internal Medicine, PGY1  [phone]    Progress Note    Interval events: No acute events. S/p 1u pRBC for Hgb 6.9, improved to 8.6.   ROS positive for:       OBJECTIVE:    10-14 @ 07:01  -  10-15 @ 07:00  --------------------------------------------------------  IN: 410 mL / OUT: 1700 mL / NET: -1290 mL      CAPILLARY BLOOD GLUCOSE      POCT Blood Glucose.: 147 mg/dL (13 Oct 2019 22:48)        VITALS:  T(F): 98.1 (10-15-19 @ 02:40), Max: 99.2 (10-14-19 @ 22:19)  HR: 94 (10-15-19 @ 02:40) (84 - 97)  BP: 161/73 (10-15-19 @ 02:40) (113/68 - 161/73)  RR: 18 (10-15-19 @ 02:40) (18 - 18)  SpO2: 94% (10-15-19 @ 02:40) (94% - 99%)    PHYSICAL EXAM:  GENERAL: No acute distress, well-developed  HEAD: atraumatic, normocephalic  EYES: Conjunctiva and sclera clear  NECK: Supple, no lymphadenopathy, no JVD  CHEST/LUNG: CTAB; No wheezing, rales, or rhonchi  HEART: RRR; Normal S1/S2. No murmurs, rubs, or gallops  ABDOMEN: Soft, non-tender, non-distended; normal bowel sounds, no organomegaly  EXTREMITIES: 2+ peripheral pulses b/l, No clubbing, cyanosis, or edema  NEUROLOGY: A&O x3, no focal deficits  SKIN: Stage 2 pressure ulcer on back. Otherwise warm, dry, intact.    LABS:                        8.6    9.44  )-----------( 273      ( 15 Oct 2019 07:14 )             26.5     Hgb trend: 8.6 <-- , 6.9 <-- , 7.5 <-- , 7.2 <-- , 7.1 <-- , 7.5 <-- , 7.9 <-- Jac Jose MD  Internal Medicine, PGY1  [phone]    Progress Note    Interval events: No acute events. S/p 1u pRBC for Hgb 6.9, improved to 8.6.   ROS positive for:       OBJECTIVE:    10-14 @ 07:01  -  10-15 @ 07:00  --------------------------------------------------------  IN: 410 mL / OUT: 1700 mL / NET: -1290 mL      CAPILLARY BLOOD GLUCOSE      POCT Blood Glucose.: 147 mg/dL (13 Oct 2019 22:48)        VITALS:  T(F): 98.1 (10-15-19 @ 02:40), Max: 99.2 (10-14-19 @ 22:19)  HR: 94 (10-15-19 @ 02:40) (84 - 97)  BP: 161/73 (10-15-19 @ 02:40) (113/68 - 161/73)  RR: 18 (10-15-19 @ 02:40) (18 - 18)  SpO2: 94% (10-15-19 @ 02:40) (94% - 99%)    PHYSICAL EXAM:  GENERAL: No acute distress, well-developed  HEAD: atraumatic, normocephalic  EYES: Conjunctiva and sclera clear  NECK: Supple, no lymphadenopathy, no JVD  CHEST/LUNG: CTAB; No wheezing, rales, or rhonchi  HEART: RRR; Normal S1/S2. No murmurs, rubs, or gallops  ABDOMEN: Soft, non-tender, non-distended; normal bowel sounds, no organomegaly  EXTREMITIES: 2+ peripheral pulses b/l, No clubbing, cyanosis, or edema  NEUROLOGY: A&O x3, no focal deficits  SKIN: Stage 2 pressure ulcer on back. Otherwise warm, dry, intact.    LABS:                        8.6    9.44  )-----------( 273      ( 15 Oct 2019 07:14 )             26.5     Hgb trend: 8.6 <-- , 6.9 <-- , 7.5 <-- , 7.2 <-- , 7.1 <-- , 7.5 <-- , 7.9 <--     STUDIES:  EGD 10/14:  Impression:          - Normal esophagus.                       - A large amount of food (residue) in the stomach.                       - No specimens collected.  Recommendation:      - [facility].                       - Resume regular diet today. Jac Jose MD  Internal Medicine, PGY1  [phone]    Progress Note    Interval events: No acute events. S/p 1u pRBC for Hgb 6.9, improved to 8.6. 1 BM overnight which was brown.    OBJECTIVE:    10-14 @ 07:01  -  10-15 @ 07:00  --------------------------------------------------------  IN: 410 mL / OUT: 1700 mL / NET: -1290 mL      CAPILLARY BLOOD GLUCOSE  POCT Blood Glucose.: 147 mg/dL (13 Oct 2019 22:48)    VITALS:  T(F): 98.1 (10-15-19 @ 02:40), Max: 99.2 (10-14-19 @ 22:19)  HR: 94 (10-15-19 @ 02:40) (84 - 97)  BP: 161/73 (10-15-19 @ 02:40) (113/68 - 161/73)  RR: 18 (10-15-19 @ 02:40) (18 - 18)  SpO2: 94% (10-15-19 @ 02:40) (94% - 99%)    PHYSICAL EXAM:  GENERAL: No acute distress, well-developed  HEAD: atraumatic, normocephalic  EYES: Conjunctiva and sclera clear  NECK: Supple, no lymphadenopathy, no JVD  CHEST/LUNG: CTAB; No wheezing, rales, or rhonchi  HEART: RRR; Normal S1/S2. No murmurs, rubs, or gallops  ABDOMEN: Soft, non-tender, non-distended; normal bowel sounds, no organomegaly  EXTREMITIES: 2+ peripheral pulses b/l, No clubbing, cyanosis, or edema  NEUROLOGY: A&O x3, no focal deficits  SKIN: Stage 2 pressure ulcer on back. Otherwise warm, dry, intact.    LABS:                        8.6    9.44  )-----------( 273      ( 15 Oct 2019 07:14 )             26.5     Hgb trend: 8.6 <-- , 6.9 <-- , 7.5 <-- , 7.2 <-- , 7.1 <-- , 7.5 <-- , 7.9 <--     STUDIES:  EGD 10/14:  Impression:          - Normal esophagus.                       - A large amount of food (residue) in the stomach.                       - No specimens collected.  Recommendation:      - [facility].                       - Resume regular diet today.

## 2019-10-15 NOTE — PROGRESS NOTE ADULT - PROBLEM SELECTOR PLAN 3
Paraplegic/ self-catheterizes. L ureteral stent, hx of nephrolithiasis, and ESBL   - Despite +UA, asymptomatic, thus will not treat On Labetalol 200mg BID.   - Currently in the setting of GIB and earlier hypotension will hold anti-hypertensives

## 2019-10-15 NOTE — PROGRESS NOTE ADULT - PROBLEM SELECTOR PLAN 2
Acute blood loss anemia likely in the setting of UGIB, now improved.  - Plan as detailed above  - Monitor CBC q12h Acute blood loss anemia likely in the setting of UGIB, now improved.  - Plan as detailed above  - Monitor CBC qd

## 2019-10-15 NOTE — PROVIDER CONTACT NOTE (OTHER) - SITUATION
Pt c/o pain.  Oxycodone IR given with no relief. Pt is due for morphine at 10:07.  Pt is requesting an early dose.

## 2019-10-15 NOTE — PROGRESS NOTE ADULT - PROBLEM SELECTOR PLAN 1
Acute blood loss anemia concern for UGI bleed, suspicion for Dieulafoy  - Hgb improved s/p 1u pRBC yesterday  - No source of bleeding identified on EGD x2 and pill endoscopy 10/8  - Repeat EGD today given melena overnight  - PPI 40 IV BID  - CBC q12h  - Maintain active type and screen, transfuse with Hgb < 7  - Vascular consulted and have low suspicion for aortoenteric fistula   - GI Dr. Arteaga following, appreciate recs  - Outpatient f/u Acute blood loss anemia concern for UGI bleed, suspicion for Dieulafoy  - Hgb improved s/p 1u pRBC yesterday  - No source of bleeding identified on EGD x2 and pill endoscopy 10/8  - Repeat EGD yesterday without source of bleed.   - Transition PPI to PO BID  - CBC qd  - Maintain active type and screen, transfuse with Hgb < 7  - Vascular consulted and have low suspicion for aortoenteric fistula   - GI Dr. Arteaga following, appreciate recs  - Outpatient f/u Acute blood loss anemia concern for UGI bleed, suspicion for Dieulafoy  - Hgb improved s/p 1u pRBC yesterday  - No source of bleeding identified on EGD x2 and pill endoscopy   - C/w PPI  - CBC qd  - Maintain active type and screen, transfuse with Hgb < 7  - Vascular consulted and have low suspicion for aortoenteric fistula   - GI Dr. Arteaga states pt stable for discharge  - Outpatient f/u

## 2019-10-15 NOTE — PROGRESS NOTE ADULT - SUBJECTIVE AND OBJECTIVE BOX
[facility] Ophthalmology Follow Up Note    Interval: Pt notes that her eye pain is the same,  no change in Visual acuity     Mood and Affect Appropriate ( x ),  Oriented to Time, Place, and Person x 3 ( x )    Ophthalmology Exam:  Visual acuity (cc): 20/25 OD , HM only OS   Pupils: PERRL OD, OS surgical, no RAPD by reverse  Ttono: 16 OD , 15 OS   Extraocular movements (EOMs): grossly full     Slit lamp Exam (SLE)  External: Flat OU  Lids/Lashes/Lacrimal Ducts: Flat OU  Sclera/Conjunctiva: W+Q OD, trace injection OS  Cornea: Cl OD,  OS: PK with stitches buried- no loose stitches; epi defect now healing, shows two 1x1 mm epidefects paracentrally with filaments adjacent, diffuse K edema OS. 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 Pt is on pred forte QID OS and valtrex 1g QD as outpatient for suppression, Pt presented initially with worsened left eye pain, and was found to have epithelial defect of the left eye. Antibiotic therapy was initiated. Pt has demonstrate resolution of epithelial defect. Pt's exam findings were discussed with patient's outpt ophthalmologist, Dr. Blank who is aware that graft is failing.     - Prednisolone acetate 1% drops to BID in left eye  - change Bacitracin ointment QID OS to Haylie ointment QID   - preservative free artificial tears q2h OS   - continue Ofloxacin QID OS   - Valtrex 1g BID  - findings and plan discussed with patient and primary team  - Drops should go in before the ointment, and can wipe of excess ointment prior to placing drops into the eye (please wait 1 minute in between drops), and then put another set of ointment after drops are placed.       Outpatient follow-up: Patient should follow-up with his/her ophthalmologist or with [facility] Department of Ophthalmology- Cornea within 1 week of after discharge at:  [address]  [place], [place] [id number] [phone] [facility] Ophthalmology Follow Up Note    Interval: Pt notes that her eye pain is the same,  no change in Visual acuity     Mood and Affect Appropriate ( x ),  Oriented to Time, Place, and Person x 3 ( x )    Ophthalmology Exam:  Visual acuity (cc): 20/25 OD , HM only OS   Pupils: PERRL OD, OS surgical, no RAPD by reverse  Ttono: 16 OD , 15 OS   Extraocular movements (EOMs): grossly full     PLE  External: Flat OU  Lids/Lashes/Lacrimal Ducts: Flat OU  Sclera/Conjunctiva: W+Q OD, trace injection OS  Cornea: Cl OD,  OS: PK with stitches buried- no loose stitches; epi defect now healing, shows two 1x1 mm epidefects paracentrally with filaments adjacent, diffuse K edema OS. 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 Pt is on pred forte QID OS and valtrex 1g QD as outpatient for suppression, Pt presented initially with worsened left eye pain, and was found to have epithelial defect of the left eye. Antibiotic therapy was initiated. Epi defect resolved, but has now returned.  Likely secondary to ruptured bullae from corneal edema secondary to failing PK OS.  Filamentary keratopathy also present likely secondary to dryness.  - Prednisolone acetate 1% drops to BID in left eye  - change Bacitracin ointment QID OS to Haylie ointment QID OS  - preservative free artificial tears q2h OS   - continue Ofloxacin QID OS   - Valtrex 1g BID  - findings and plan discussed with patient and primary team  - Drops should go in before the ointment, and can wipe of excess ointment prior to placing drops into the eye (please wait 1 minute in between drops), and then put another set of ointment after drops are placed.   - case discussed with pt's outside cornea specialist, Dr. Blank, plan per Dr. Blank      Outpatient follow-up: Patient should follow-up with his/her ophthalmologist or with [facility] Department of Ophthalmology- Cornea within 1 week of after discharge at:  [address]  [place] [address]  [phone]

## 2019-10-15 NOTE — PROGRESS NOTE ADULT - PROBLEM SELECTOR PLAN 8
DVT ppx: IMPROVE 2, AC contraindicated given GIB, SCDs  Diet: Regular  Dispo: Will speak to Dr. Ambrocio regarding H/H and readiness for dispo
DVT ppx: IMPROVE 2, AC contraindicated given GIB, SCDs  Diet: Regular  Dispo: Will speak to Dr. Ambrocio regarding H/H and readiness for dispo

## 2019-10-15 NOTE — PROGRESS NOTE ADULT - PROBLEM SELECTOR PLAN 4
On Labetalol 200mg BID.   - Currently in the setting of GIB and earlier hypotension will hold anti-hypertensives Uses baclofen pump and spinal stimulator, per  has not been helpful.  - Pump last refilled 9/18/19 in MICU, next refill due by 12/1/19  - Follows with Dr. Vieira pain management opt/ Dr. Gonzales for baclofen pump  - Oxycodone 10mg q4h PRN for moderate to severe pain   - Morphine 2mg IV q4h PRN for breakthrough  - Duloxetine 30mg BID  - Gabapentin 800mg TID  - Baclofen 20mg BID   - ISTOP in the chart  - Chronic Pain consulted, will f/u recs

## 2019-10-16 NOTE — PROGRESS NOTE ADULT - ASSESSMENT
Jac Jose MD  Internal Medicine, PGY1  [phone]    Progress Note    Interval events: No acute events. 2 small ?dark BMs overnight. HDS and Hgb 7.8.     OBJECTIVE:    10-15 @ 07:01  -  10-16 @ 07:00  --------------------------------------------------------  IN: 0 mL / OUT: 2100 mL / NET: -2100 mL      VITALS:  T(F): 98.8 (10-16-19 @ 04:27), Max: 98.8 (10-16-19 @ 04:27)  HR: 89 (10-16-19 @ 04:27) (83 - 93)  BP: 145/72 (10-16-19 @ 04:27) (134/70 - 166/79)  RR: 18 (10-16-19 @ 04:27) (18 - 18)  SpO2: 94% (10-16-19 @ 04:27) (93% - 99%)    PHYSICAL EXAM:  GENERAL: NAD  CHEST/LUNG: CTAB; No wheezing, rales, or rhonchi  HEART: RRR; Normal S1/S2. No murmurs, rubs, or gallops  ABDOMEN: Soft, non-tender, non-distended; normal bowel sounds, no organomegaly  EXTREMITIES: 2+ peripheral pulses b/l, No clubbing, cyanosis, or edema  NEUROLOGY: A&O x3, no focal deficits    LABS:                        7.8    8.29  )-----------( 254      ( 16 Oct 2019 01:02 )             23.7     Hgb trend: 7.8 <-- , 8.6 <-- , 6.9 <-- , 7.5 <-- , 7.2 <-- , 7.1 <-- , 7.5 <--   WBC trend: 8.29 <-- , 9.44 <-- , 10.05 68F h/o aortic dissection (post-repair several years prior), spinal cord hematoma (now functionally paraplegic), chronic spasticity + pain (on Oxycodone and Baclofen pump), HTN, nephrolithiasis s/p left urethral stent, UTIs and endotheliitis/keratitis (post-corneal transplant) admitted for melena. No source of bleeding identified on EGD or pill endoscopy. Stable for discharge.

## 2019-10-16 NOTE — PROGRESS NOTE ADULT - PROBLEM SELECTOR PLAN 5
-Per , ureteral stent placed by Dr. Mayorga outpatient  -Will f/u regarding removal or replacement as per d/w , although likely will not happen inpatient  -Outpatient f/u -Per , ureteral stent placed by Dr. Mayorga outpatient  -Outpatient f/u

## 2019-10-16 NOTE — PROGRESS NOTE ADULT - SUBJECTIVE AND OBJECTIVE BOX
Jac Jose MD  Internal Medicine, PGY1  [phone]    Progress Note    Interval events: No acute events. 2 small ?dark BMs overnight. HDS and Hgb 7.8.     OBJECTIVE:    10-15 @ 07:01  -  10-16 @ 07:00  --------------------------------------------------------  IN: 0 mL / OUT: 2100 mL / NET: -2100 mL      VITALS:  T(F): 98.8 (10-16-19 @ 04:27), Max: 98.8 (10-16-19 @ 04:27)  HR: 89 (10-16-19 @ 04:27) (83 - 93)  BP: 145/72 (10-16-19 @ 04:27) (134/70 - 166/79)  RR: 18 (10-16-19 @ 04:27) (18 - 18)  SpO2: 94% (10-16-19 @ 04:27) (93% - 99%)    PHYSICAL EXAM:  GENERAL: NAD  CHEST/LUNG: CTAB; No wheezing, rales, or rhonchi  HEART: RRR; Normal S1/S2. No murmurs, rubs, or gallops  ABDOMEN: Soft, non-tender, non-distended; normal bowel sounds, no organomegaly  EXTREMITIES: 2+ peripheral pulses b/l, No clubbing, cyanosis, or edema  NEUROLOGY: A&O x3, no focal deficits    LABS:                        7.8    8.29  )-----------( 254      ( 16 Oct 2019 01:02 )             23.7     Hgb trend: 7.8 <-- , 8.6 <-- , 6.9 <-- , 7.5 <-- , 7.2 <-- , 7.1 <-- , 7.5 <--   WBC trend: 8.29 <-- , 9.44 <-- , 10.05

## 2019-10-16 NOTE — ED ADULT NURSE REASSESSMENT NOTE - NS ED NURSE REASSESS COMMENT FT1
Patient with large BM, and 1 episode urinary incontinence Patient with large BM, and 1 episode urinary incontinence. Perineal care provided, bedsheets changed. Patient resting comfortably.

## 2019-10-16 NOTE — PROGRESS NOTE ADULT - PROBLEM SELECTOR PLAN 6
- c/w Valacyclovir 1000mg BID  - c/w prednisolone 1% drops BID L eye  - c/w bacitracin ointment QID L eye  - c/w Ofloxacin QID L eye  - Ophtho following, appreciate recs  - Outpatient ophtho f/u

## 2019-10-16 NOTE — PROGRESS NOTE ADULT - PROBLEM SELECTOR PLAN 7
DVT ppx: IMPROVE 2, AC contraindicated given GIB, SCDs  Diet: Regular  Dispo: Stable for d/c as per GI Dr. Ambrocio

## 2019-10-16 NOTE — PROGRESS NOTE ADULT - REASON FOR ADMISSION
Paige

## 2019-10-16 NOTE — PROGRESS NOTE ADULT - PROVIDER SPECIALTY LIST ADULT
Gastroenterology
Internal Medicine
Ophthalmology
Pain Medicine
Gastroenterology
Ophthalmology
Internal Medicine

## 2019-10-16 NOTE — ED ADULT NURSE NOTE - OBJECTIVE STATEMENT
Patient brought in by EMS with hypotension and rapid heart rate. Patient discharged from [facility] earlier this evening.

## 2019-10-16 NOTE — ED ADULT NURSE NOTE - CHIEF COMPLAINT QUOTE
pt presents to ED due to GI bleeding pt was DC from [place] at 530pm sent by MD for Ct Angio pts  called MD Majano due to low Bp at home

## 2019-10-16 NOTE — PROGRESS NOTE ADULT - ATTENDING COMMENTS
I have interviewed and examined the patient and reviewed the residents note including the history, exam, assessment, and plan.  I agree with the residents assessment and plan.    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 Pt is on pred forte QID OS and valtrex 1g QD as outpatient for suppression, Pt presented initially with worsened left eye pain, and was found to have epithelial defect of the left eye. Antibiotic therapy was initiated. Epi defect resolved, but has now returned.  Likely secondary to ruptured bullae from corneal edema secondary to failing PK OS.  Filamentary keratopathy also present likely secondary to dryness.  - Prednisolone acetate 1% drops to BID in left eye  - change Bacitracin ointment QID OS to Haylie ointment QID OS  - preservative free artificial tears q2h OS   - continue Ofloxacin QID OS   - Valtrex 1g BID  - findings and plan discussed with patient and primary team  - Drops should go in before the ointment, and can wipe of excess ointment prior to placing drops into the eye (please wait 1 minute in between drops), and then put another set of ointment after drops are placed.   - case discussed with pt's outside cornea specialist, Dr. Blank, plan per Dr. Blank      Outpatient follow-up: Patient should follow-up with his/her ophthalmologist or with [facility] Department of Ophthalmology- Cornea within 1 week of after discharge  Shireen Joshi MD
I have interviewed and examined the patient and reviewed the residents note including the history, exam, assessment, and plan.  I agree with the residents assessment and plan.    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 Pt is on pred forte QID OS and valtrex 1g QD as outpatient for suppression, Pt presented initially with worsened left eye pain, and was found to have epithelial defect of the left eye. Antibiotic therapy was initiated. Pt has demonstrate resolution of epithelial defect. Pt's exam findings were discussed with patient's outpt ophthalmologist, Dr. Blank who is aware that graft is failing.   - Prednisolone acetate 1% drops to BID in left eye  - continue Bacitracin ointment QID OS   - continue Ofloxacin QID OS   - Valtrex 1g BID if medically permissible   - findings and plan discussed with patient and primary team      Outpatient follow-up: Patient should follow-up with his/her ophthalmologist or with [facility] Department of Ophthalmology- Cornea within 1 week of after discharge  Shireen Joshi MD
H/H stable over last 24 hours. No further melena. EGD Monday without source of bleed. Suspect possible Dieulafoy lesion given multiple scopes without localization of bleed. d/c on home pain regimen with pain management follow up   Close GI follow up on d/c    d/c time spent 31 minutes
cbc daily  chronic pain c/s   needs gi clearance prior to d/c
trend H/H  downtrending a bit this am, repeat in evening  if rebleeds needs stat CTA
H/H downtrended overnight, s/p 1 u pRBC with overcorrection. Unclear if true result overnight given no melena or bleeding seen on EGD.   Will continue to monitor H/H, if remains stable will plan for d/c home tomorrow.
With another episode of melena overnight, s/p 1u pRBC's. CTA performed overnight negative for bleeding.   Given continued melena, planned for EGD later today as pt ate breakfast. NPO at present.   Will d/c Meropenem as suspect hemodynamic changes overnight were 2/2 GI bleeding.
acute blood loss anemia 2.2 to ugi bleed, egd unrevealing  pill capsule placed  d.w Dr. 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

## 2019-10-16 NOTE — ED PROVIDER NOTE - CADM POA URETHRAL CATHETER
[General Appearance - Well Developed] : well developed [Normal Appearance] : normal appearance [Well Groomed] : well groomed [General Appearance - Well Nourished] : well nourished [No Deformities] : no deformities [General Appearance - In No Acute Distress] : no acute distress [Normal Oral Mucosa] : normal oral mucosa [No Oral Pallor] : no oral pallor [No Oral Cyanosis] : no oral cyanosis [Normal Oropharynx] : normal oropharynx [Normal Jugular Venous A Waves Present] : normal jugular venous A waves present [Normal Jugular Venous V Waves Present] : normal jugular venous V waves present [No Jugular Venous Mcdonald A Waves] : no jugular venous mcdonald A waves [] : no respiratory distress [Respiration, Rhythm And Depth] : normal respiratory rhythm and effort [Exaggerated Use Of Accessory Muscles For Inspiration] : no accessory muscle use [Auscultation Breath Sounds / Voice Sounds] : lungs were clear to auscultation bilaterally [Bowel Sounds] : normal bowel sounds [Abdomen Soft] : soft [Abdomen Tenderness] : non-tender [Abnormal Walk] : normal gait [Gait - Sufficient For Exercise Testing] : the gait was sufficient for exercise testing [Nail Clubbing] : no clubbing of the fingernails [Cyanosis, Localized] : no localized cyanosis [Skin Color & Pigmentation] : normal skin color and pigmentation [No Venous Stasis] : no venous stasis [No Xanthoma] : no  xanthoma was observed [Oriented To Time, Place, And Person] : oriented to person, place, and time [Impaired Insight] : insight and judgment were intact [Affect] : the affect was normal [Mood] : the mood was normal [No Anxiety] : not feeling anxious [Conjunctiva] : the conjunctiva were normal in both eyes [PERRL] : pupils were equal in size, round, and reactive to light [EOM Intact] : extraocular movements were intact [Yellow Sclera (Icteric)] : no scleral icterus was seen [5th Left ICS - MCL] : palpated at the 5th LICS in the midclavicular line [Normal] : normal [No Precordial Heave] : no precordial heave was noted [Normal Rate] : normal [Rhythm Regular] : regular [Normal S1] : normal S1 [Normal S2] : normal S2 [No Gallop] : no gallop heard [No Murmur] : no murmurs heard [Right Carotid Bruit] : no bruit heard over the right carotid [Left Carotid Bruit] : no bruit heard over the left carotid [2+] : left 2+ No [No Pitting Edema] : no pitting edema present

## 2019-10-16 NOTE — ED PROVIDER NOTE - CLINICAL SUMMARY MEDICAL DECISION MAKING FREE TEXT BOX
Pt with recent GI bleed negative endoscopy, now with tachycardia and hypotension. Concern for rebleed. Will check labs, plan for CTA abd. Will transfuse as needed, reassess.

## 2019-10-16 NOTE — PROGRESS NOTE ADULT - PROBLEM SELECTOR PLAN 4
Uses baclofen pump and spinal stimulator, per  has not been helpful.  - Pump last refilled 9/18/19 in MICU, next refill due by 12/1/19  - Follows with Dr. Vieira pain management opt/ Dr. Gonzales for baclofen pump  - Oxycodone 10mg q4h PRN for moderate to severe pain   - Morphine 2mg IV q4h PRN for breakthrough  - Duloxetine 30mg BID  - Gabapentin 800mg TID  - Baclofen 20mg BID   - ISTOP in the chart  - Chronic Pain consulted, will f/u recs

## 2019-10-16 NOTE — PROGRESS NOTE ADULT - PROBLEM SELECTOR PLAN 2
Acute blood loss anemia likely in the setting of UGIB, now improved.  - Plan as detailed above  - Monitor CBC qd

## 2019-10-16 NOTE — ED PROVIDER NOTE - OBJECTIVE STATEMENT
67 y/o female with PMHx of anemia, aortic dissection, HTN, PAD, paraplegia, spontaneous subdural hematoma, TIA, and UTI presents to the ED BIBEMS for +hypotension.  took BP and it was very low, prompting EMS call. Pt was d/c from [facility] today at 5 PM after being admitted for admitted for melena. No source of bleeding identified on EGD or pill endoscopy. Never a smoker. NKDA. PCP: Dr. Basil Branham.

## 2019-10-16 NOTE — PROGRESS NOTE ADULT - PROBLEM SELECTOR PLAN 1
Acute blood loss anemia concern for UGI bleed, suspicion for Dieulafoy  - No source of bleeding identified on EGD x2 and pill endoscopy   - C/w PPI  - CBC qd  - Maintain active type and screen, transfuse Hgb < 7  - Vascular consulted and have low suspicion for aortoenteric fistula   - GI Dr. Arteaga states pt stable for discharge  - Outpatient f/u

## 2019-10-16 NOTE — ED PROVIDER NOTE - PMH
Anemia  chronic  Aortic dissection, thoracic  Type A Repaired 2009  Blindness of left eye  hx corneal transplant 2018  Aug.2018  Dieulafoy lesion of stomach or duodenum  10/1/2019  Dorsalgia of lumbar region  on pain medication /baclofen po and pump  Gastrointestinal hemorrhage  9/28/2019, 9/4/2019, 8/29/2019  Hematoma  spinal  September  treated  September 2018  HTN (Hypertension)  on meds  Melena  10/7/2019  Osteoporosis    PAD (peripheral artery disease)    Paraplegia  on wheelchair goes to physical therapy 2 x weekly  Self-catheterizes urinary bladder    Subdural hematoma, nontraumatic  spontaneous  TIA (transient ischemic attack)    UTI (urinary tract infection)  stable x 3 months  Uveitis

## 2019-10-16 NOTE — ED ADULT NURSE REASSESSMENT NOTE - NS ED NURSE REASSESS COMMENT FT1
updated patient's  Alexis on treatment plan, and minimum of 3 hours per each blood transfusion. contact information: [phone] (cell).

## 2019-10-17 NOTE — H&P ADULT - PROBLEM SELECTOR PLAN 4
- Due to leukocytosis, tachycardia  - Likely due to stercoral proctitis complicated by bleeding  - S/p 2U PRBCs  - Lactate normal  - Plan as noted above, will add IVFs - Due to leukocytosis, tachycardia  - Likely due to stercoral proctitis complicated by bleeding  - S/p 2U PRBCs  - Lactate normal  - Plan as noted above, will add IVFs after PRBCs - Due to leukocytosis, tachycardia  - Likely due to stercoral proctitis complicated by bleeding  - S/p 2U PRBCs  - Lactate normal  - Plan as noted above, will add IVFs after PRBCs  - UA shows bacteria but with epithelial cells, likely contaminant  - F/u CTA chest

## 2019-10-17 NOTE — PATIENT PROFILE ADULT - FALL HARM RISK
other/paraplegia/bones(Osteoporosis,prev fx,steroid use,metastatic bone ca)/coagulation(Bleeding disorder R/T clinical cond/anti-coags)

## 2019-10-17 NOTE — H&P ADULT - PROBLEM SELECTOR PLAN 5
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

## 2019-10-17 NOTE — H&P ADULT - NSHPLABSRESULTS_GEN_ALL_CORE
Labs personally reviewed and interpreted. Notable for marked leukocytosis 21.43 (84.2% neutrophils) changed from one day prior, Hb 6.3 (had been 7.8 one day prior) and plts 280. Na 140, K 3.5, Cl 110, HCO3 23 with AG 9, BUN elevated but stable at 24, creatinine 0.83 but baseline had been 0.4 one day prior. Lactate normal at 1.4. INR normal at 1.14. Low albumin at 1.9. Tbili and LFTs wnl.    CTA A/P personally reviewed and interpreted. Notable for stable left renal atrophy without hydronephrosis. Left double-J ureteral stent with stable 1.1 cm left mid ureteral calculus. No active gastrointestinal hemorrhage.   Fluid and debris distention of the stomach. Fluid distention of the lower esophagus. No bowel   obstruction. Large fecal retention throughout the colon and rectum with evidence of stercoral proctitis. No free air or ascites.  Stable aortoiliac dissection with stable caliber and patency of the true and false lumen. No evidence of mesenteric branch vessel occlusion. 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 IVC filter.    EKG personally reviewed. Sinus tachycardia, normal axis. Rate 124, , QTc 491. No pathological Q waves or ST changes.

## 2019-10-17 NOTE — H&P ADULT - PROBLEM SELECTOR PLAN 1
- Likely due to GI bleed. Patient has a history of GI bleeds, last admission with EGD and capsule endoscopy unable to find source but was thought to have been due to Dieulafoy lesion  - Obtain CT chest as well to r/o bleeding into the chest, particularly in light of left flank pain  - Patient consented  - Type and screen  - 2 large bore IVs  - PPI IV BID is acceptable  - GI consult. May need colonoscopy  - receiving 2U PRBCs, will f/u Hb and trend CBCs Q8H - Likely from blood loss due to GI bleed. Patient has a history of GI bleeds, last admission with EGD and capsule endoscopy unable to find source but was thought to have been due to Dieulafoy lesion  - Obtain CT chest as well to r/o bleeding into the chest, particularly in light of left flank pain  - Patient consented  - Type and screen  - 2 large bore IVs  - PPI IV BID is acceptable  - GI consult. May need colonoscopy  - receiving 2U PRBCs, will f/u Hb and trend CBCs Q8H

## 2019-10-17 NOTE — PROGRESS NOTE ADULT - ASSESSMENT
69 yo F with a PMH recurrent GI bleeds, aortic dissection s/p multiple repairs, spinal hematoma resulting in paraplegia (on baclofen pump), CVA, recurrent UTIs, HTN, PAD, and HSV endophthalmitis/keratitis s/p left corneal transplant who presents with hypotension and hemoglobin drop due to suspected bleed.

## 2019-10-17 NOTE — H&P ADULT - PROBLEM SELECTOR PLAN 3
- Patient with large amount of stool with evidence of stercoral proctitis on CT  - Patient with marked leukocytosis, tachycardia, and low grade temperatures  - Patient also with distended, tense abdomen. Lactate normal  - Serial abdominal exams  - Will give Zosyn  - F/u blood cx  - Will give Miralax and Colace standing, monitor BMs

## 2019-10-17 NOTE — H&P ADULT - ASSESSMENT
67 yo F with a PMH recurrent GI bleeds, aortic dissection s/p multiple repairs, spinal hematoma resulting in paraplegia (on baclofen pump), CVA, recurrent UTIs, HTN, PAD, and HSV endophthalmitis/keratitis s/p left corneal transplant who presents with hypotension and hemoglobin drop due to suspected bleed.

## 2019-10-17 NOTE — CONSULT NOTE ADULT - SUBJECTIVE AND OBJECTIVE BOX
Patient is a 68y old  Female who presents with a chief complaint of     HPI:  67 yo F with a PMH recurrent GI bleeds, aortic dissection s/p multiple repairs, spinal hematoma resulting in paraplegia (on baclofen pump), CVA, recurrent UTIs, HTN, PAD, and HSV endophthalmitis/keratitis s/p left corneal transplant who presents with hypotension.   She was discharged from [facility] on 10/16 and returned in the evening after her  checked SBP 70s.  She had negative ct angiogram and has hgb 9 after 2 units PRBC for initial hgb 6.9 down from earlier in the day 10/16  This AM she is tired and has no other active complaints    PAST MEDICAL & SURGICAL HISTORY:  Melena: 10/7/2019  Gastrointestinal hemorrhage: 9/28/2019, 9/4/2019, 8/29/2019  Dieulafoy lesion of stomach or duodenum: 10/1/2019  Subdural hematoma, nontraumatic: spontaneous  Dorsalgia of lumbar region: on pain medication /baclofen po and pump  Self-catheterizes urinary bladder  Anemia: chronic  Uveitis  Osteoporosis  PAD (peripheral artery disease)  Hematoma: spinal treated September 2018  Paraplegia: due to spinal hematoma, on wheelchair goes to physical therapy 2 x weekly  Aortic dissection, thoracic: Type A Repaired 2009  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 2018  UTI (urinary tract infection): recurrent  TIA (transient ischemic attack)  HTN (Hypertension)  History of corneal transplant: left corneal transplant on 5/21/2018  Disorder of spine: unthetethering 2 x  Presence of IVC filter: 2014 ?  S/P aortic dissection repair: Type A Dissection repair /2009   descending aortic aneurysm repair 9/2016  H/O Spinal surgery: laminectomies 2014    MEDICATIONS  (STANDING):  artificial  tears Solution 1 Drop(s) Both EYES every 4 hours  ascorbic acid 500 milliGRAM(s) Oral daily  atorvastatin 40 milliGRAM(s) Oral at bedtime  baclofen 20 milliGRAM(s) Oral two times a day  docusate sodium 100 milliGRAM(s) Oral three times a day  DULoxetine 30 milliGRAM(s) Oral two times a day  gabapentin 800 milliGRAM(s) Oral three times a day  labetalol 200 milliGRAM(s) Oral two times a day  lidocaine   Patch 1 Patch Transdermal daily  multivitamin 1 Tablet(s) Oral daily  ofloxacin 0.3% Solution 1 Drop(s) Left EYE four times a day  pantoprazole  Injectable 40 milliGRAM(s) IV Push two times a day  piperacillin/tazobactam IVPB.. 3.375 Gram(s) IV Intermittent every 8 hours  polyethylene glycol 3350 17 Gram(s) Oral daily  potassium chloride    Tablet ER 40 milliEquivalent(s) Oral once  prednisoLONE acetate 1% Suspension 1 Drop(s) Left EYE four times a day  valACYclovir 1000 milliGRAM(s) Oral three times a day    MEDICATIONS  (PRN):  acetaminophen   Tablet .. 650 milliGRAM(s) Oral every 6 hours PRN Mild Pain (1 - 3)  oxyCODONE    IR 10 milliGRAM(s) Oral three times a day PRN Moderate and Severe Pain  sodium chloride 2% Ophthalmic Solution 1 Drop(s) Left EYE four times a day PRN Eye pain  zolpidem 5 milliGRAM(s) Oral at bedtime PRN Insomnia    Allergies  No Known Allergies    SOCIAL HISTORY: lives home, has aid, no etoh    FAMILY HISTORY:  No pertinent family history in first degree relatives    REVIEW OF SYSTEMS:    CONSTITUTIONAL: weakness  EYES/ENT: No visual changes;  No vertigo or throat pain   NECK: No pain or stiffness  RESPIRATORY: No cough, wheezing, hemoptysis; No shortness of breath  CARDIOVASCULAR: No chest pain or palpitations  GASTROINTESTINAL: as above  GENITOURINARY: No dysuria, frequency or hematuri  SKIN: No itching, burning, rashes, or lesions   PSYCH: flat affect this AM  All other review of systems is negative unless indicated above.    Vital Signs Last 24 Hrs  T(C): 36.9 (17 Oct 2019 08:43), Max: 37.8 (17 Oct 2019 02:29)  T(F): 98.4 (17 Oct 2019 08:43), Max: 100.1 (17 Oct 2019 02:29)  HR: 136 (17 Oct 2019 08:43) (93 - 136)  BP: 153/90 (17 Oct 2019 08:43) (117/70 - 153/90)  BP(mean): 96 (17 Oct 2019 02:01) (82 - 96)  RR: 20 (17 Oct 2019 08:43) (15 - 22)  SpO2: 94% (17 Oct 2019 08:43) (94% - 100%)    PHYSICAL EXAM:    Constitutional: appears chronically but not acutely ill  HEENT: MMM  Neck: No LAD  Respiratory: CTA  Cardiovascular: S1 and S2, tachycardic  Gastrointestinal: BS+, soft, NT/ND  Extremities: No peripheral edema  Neurological: A/O x 3  Psychiatric: Normal mood, flat affect  Skin: No rashes    LABS:                        9.0    16.02 )-----------( 222      ( 17 Oct 2019 08:41 )             26.4     10-17    136  |  104  |  37<H>  ----------------------------<  111<H>  3.4<L>   |  22  |  0.40<L>    Ca    7.6<L>      17 Oct 2019 08:41  Phos  3.7     10-17  Mg     1.7     10-17    TPro  4.7<L>  /  Alb  1.9<L>  /  TBili  0.3  /  DBili  x   /  AST  26  /  ALT  21  /  AlkPhos  71  10-16    PT/INR - ( 17 Oct 2019 02:15 )   PT: 12.7 sec;   INR: 1.14 ratio         PTT - ( 16 Oct 2019 19:11 )  PTT:27.8 sec  LIVER FUNCTIONS - ( 16 Oct 2019 19:11 )  Alb: 1.9 g/dL / Pro: 4.7 gm/dL / ALK PHOS: 71 U/L / ALT: 21 U/L / AST: 26 U/L / GGT: x             RADIOLOGY & ADDITIONAL STUDIES:

## 2019-10-17 NOTE — H&P ADULT - PROBLEM SELECTOR PLAN 7
- Due to past spinal hematoma complicated by neuropathic pain  - On Baclofen pump. Will also continue Baclofen 20 mg BID  - Will also continue Duloxetine 30 mg BID and Gabapentin 800 mg TID

## 2019-10-17 NOTE — H&P ADULT - NSHPPHYSICALEXAM_GEN_ALL_CORE
Vital Signs Last 24 Hrs  T(C): 37.4 (17 Oct 2019 06:47), Max: 37.8 (17 Oct 2019 02:29)  T(F): 99.4 (17 Oct 2019 06:47), Max: 100.1 (17 Oct 2019 02:29)  HR: 119 (17 Oct 2019 06:47) (93 - 136)  BP: 147/74 (17 Oct 2019 06:47) (117/70 - 147/77)  BP(mean): 96 (17 Oct 2019 02:01) (82 - 96)  RR: 20 (17 Oct 2019 06:47) (15 - 22)  SpO2: 95% (17 Oct 2019 06:47) (94% - 100%)    GENERAL: No acute distress  HEENT: PERRL, EOMI, MMM, no oropharyngeal lesions  NECK: supple, no stiffness, no JVD, no thyromegaly  PULM: respiration non-labored, clear to auscultation bilaterally, no rales, rhonchi, or wheezes  CV: regular rate and rhythm, no murmurs, gallops, or rubs  GI: abdomen soft, nontender, nondistended, no masses felt, normal bowel sounds  : no genital lesions or ulcers  MSK: strength 5/5 bilateral upper/lower extremities. No joint swelling, erythema, or warmth.  LYMPH: no anterior cervical, posterior cervical, supraclavicular, or inguinal lymphadenopathy  NEURO: A&Ox3, no tremors, sensation intact  SKIN: no rashes, lesions, or edema Vital Signs Last 24 Hrs  T(C): 37.4 (17 Oct 2019 06:47), Max: 37.8 (17 Oct 2019 02:29)  T(F): 99.4 (17 Oct 2019 06:47), Max: 100.1 (17 Oct 2019 02:29)  HR: 119 (17 Oct 2019 06:47) (93 - 136)  BP: 147/74 (17 Oct 2019 06:47) (117/70 - 147/77)  BP(mean): 96 (17 Oct 2019 02:01) (82 - 96)  RR: 20 (17 Oct 2019 06:47) (15 - 22)  SpO2: 95% (17 Oct 2019 06:47) (94% - 100%)    BP right arm 130/75, left 126/74    GENERAL: No acute distress  HEENT: left eye dry s/p corneal transplant. Right pupil reactive to light/accomodation with EOMI. MMM, no oropharyngeal lesions  NECK: supple, no stiffness, no JVD, no thyromegaly  PULM: respirations non-labored, clear to auscultation bilaterally, no rales, rhonchi, or wheezes  CV: tachycardic with regular rhythm, no murmurs, gallops, or rubs  GI: abdomen tense, distended, no masses felt, normal bowel sounds. No rebound TTP  : left flank TTP  MSK: strength 5/5 bilateral upper extremities, 1/5 bilateral lower extremities. No joint swelling, erythema, or warmth.  LYMPH: no anterior cervical, posterior cervical, supraclavicular, or inguinal lymphadenopathy  NEURO: A&Ox3, no tremors, sensation intact  SKIN: no rashes, lesions, or edema

## 2019-10-17 NOTE — H&P ADULT - PROBLEM SELECTOR PLAN 2
- Left flank  - Patient had CTA A/P but would want to r/o PE in light of tachycardia, as well as rule out bleeding, as symptoms began in the hospital. Patient is at much higher risk for PE due to paraplegia  - CTA A/P negative for mesenteric ischemia - Left flank  - Patient had CTA A/P but would want to r/o PE in light of tachycardia, as well as rule out bleeding, as symptoms began in the hospital. Patient is at much higher risk for PE due to paraplegia  - CTA A/P negative for mesenteric ischemia but did note stable non-obstructive 1.1 cm ureteral calculus. Patient does have a history of ureteral calculi  - IVFs and pain control for now while CTA chest pending - Left flank  - Patient had CTA A/P but would want to r/o PE in light of tachycardia, as well as rule out bleeding, as symptoms began in the hospital. Patient is at much higher risk for PE due to paraplegia  - CTA A/P negative for mesenteric ischemia but did note stable non-obstructive 1.1 cm ureteral calculus. Patient does have a history of ureteral calculi  - No evidence of pyelo on imaging  - IVFs and pain control for now while CTA chest pending

## 2019-10-17 NOTE — H&P ADULT - NSHPSOCIALHISTORY_GEN_ALL_CORE
No smoking history, former social alcohol use.  Lives with her .  Paraplegic.  Former  for deaf students.

## 2019-10-17 NOTE — H&P ADULT - NSHPREVIEWOFSYSTEMS_GEN_ALL_CORE
Gen: + malaise. Negative for fevers, chills, weight loss, weight gain, fatigue  Eyes: + chronic dry left eye. No blured vision  ENT: no tinnitus or vertigo  Resp: no wheezing, dyspnea, pleuritic chest pain, hemoptysis, or orthopnea  CV: no chest pain, dyspnea on exertion, or palpitations  GI: + abdominal pain, bloating. No nausea, vomiting, diarrhea, constipation, melena, or hematochezia  : no dysuria, hematuria, discharge, or incontinence  MSK: + myalgias. No joint swelling  Neuro: + chronic paraplegia, weakness. No confusion, dizziness, tremors, or seizures  Skin: no rash, lesions, or edema Gen: + malaise. Negative for fevers, chills, weight loss, weight gain, fatigue  Eyes: + chronic dry left eye. No blurred vision  ENT: no tinnitus or vertigo  Resp: no wheezing, dyspnea, pleuritic chest pain, hemoptysis, or orthopnea  CV: no chest pain, dyspnea on exertion, or palpitations  GI: + abdominal pain, bloating. No nausea, vomiting, diarrhea, constipation, melena, or hematochezia  : no dysuria, hematuria, discharge, or incontinence  MSK: + myalgias. No joint swelling  Neuro: + chronic paraplegia, weakness. No confusion, dizziness, tremors, or seizures  Skin: no rash, lesions, or edema

## 2019-10-17 NOTE — H&P ADULT - HISTORY OF PRESENT ILLNESS
67 yo F with a PMH recurrent GI bleeds, aortic dissection s/p multiple repairs, spinal hematoma resulting in paraplegia (on baclofen pump), CVA, recurrent UTIs, HTN, PAD, and HSV endophthalmitis/keratitis s/p left corneal transplant who presents with hypotension. Her  checked her BP at home yesterday and it was low, which was why he brought her to the ED. She had been discharged earlier that morning from [facility] after having been hospitalized for GI bleeding/melena. She had an endoscopy x2 and capsule endoscopy that showed no source of bleeding. She also received 1U PRBCs.  She denies having symptoms after discharge, and does not know how low her BP was. She denies fevers, chills, chest pain, cough, or SOB. She has had a BM every day, and is not sure if it is liquid or loose and is not sure if she is still having blood in her stool. She denies dysuria or hematuria. Since coming into the ED, she has endorsed an achy, intermittent left flank pain radiating frontward into her abdomen. She describes the pain severity as "severe," but is unable to further quantify or qualify it.    In the ED, she was given Acetaminophen 650 mg PO x1, Zosyn IV x1, and 2U PRBCs.

## 2019-10-17 NOTE — CONSULT NOTE ADULT - ASSESSMENT
69yo female with multiple medical problems with recurrent GI bleeding  source is upper as she has had multiple EGD with active bleeding or clots but exact source not seen at that time  CT angiogram negative  if signs of recurrent bleeding, will repeat egd    I will d/w Dr Ambrocio who has been caring for her at [place]

## 2019-10-17 NOTE — H&P ADULT - PROBLEM SELECTOR PLAN 9
- DVT PPX: IMPROVE score of 3, but due to suspected bleeding, hold off pharmacological PPX. Will give IPCs instead  - Diet: NPO for now  - Dispo: pending GI workup

## 2019-10-17 NOTE — H&P ADULT - PROBLEM SELECTOR PLAN 6
- Due to past spinal hematoma  - On Baclofen pump. 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

## 2019-10-18 NOTE — DIETITIAN INITIAL EVALUATION ADULT. - PROBLEM SELECTOR PLAN 5
- Likely prerenal from acute blood loss  - F/u repeat creatinine after PRBCs and IVFs  - Also given IV contrast for CTA x2

## 2019-10-18 NOTE — CHART NOTE - NSCHARTNOTEFT_GEN_A_CORE
Called by RN for Hb 7.1, dropped from 8.4 earlier in the day. Patient continuing to have dark small stools. Went to evaluate patient. Patient has no complaints, resting comfortably. HR 90s, BP 120s/50s. Repeat Hb 6.9. Will transfuse 1 units PRBCs. Previously GI had been monitoring, but as Hb is dropping with continued dark stools, GI will need to be called again in AM. Will make NPO.    Aryles Hedjar, MD  Night Hospitalist

## 2019-10-18 NOTE — DIETITIAN INITIAL EVALUATION ADULT. - PROBLEM SELECTOR PLAN 4
- Due to leukocytosis, tachycardia  - Likely due to stercoral proctitis complicated by bleeding  - S/p 2U PRBCs  - Lactate normal  - Plan as noted above, will add IVFs after PRBCs  - UA shows bacteria but with epithelial cells, likely contaminant  - F/u CTA chest

## 2019-10-18 NOTE — DIETITIAN INITIAL EVALUATION ADULT. - PHYSICAL APPEARANCE
underweight/other (specify) NFPE limited 2/2 edema in lower body.  severe muscle wasting; clavicle, temporal.  moderate muscle wasting; deltoid.  moderate fat wasting: tricep.  severe Rt/Lt leg/ankle and mild Lt/Rt arm edema.  manas score of 11; stage 2 PU on coccyx.

## 2019-10-18 NOTE — DIETITIAN INITIAL EVALUATION ADULT. - OTHER INFO
67yo female with PMH of recurrent GIB, spinal hematoma resulting in paraplegia, CVA p/w hypotension.  Pt admitted with acute on chronic anemia likely 2/2 blood loss due to GIB.  Proctitis, pt with large amount of stool with evidence of stercoral proctitis on CT.  sepsis 2/2 stercoral proctitis c/b bleeding.  BM (+) 10/17, liquid, tarry, black.

## 2019-10-18 NOTE — DIETITIAN INITIAL EVALUATION ADULT. - PERTINENT LABORATORY DATA
10-17 Na136 mmol/L Glu 111 mg/dL<H> K+ 3.4 mmol/L<L> Cr  0.40 mg/dL<L> BUN 37 mg/dL<H> Phos 3.7 mg/dL Alb n/a   PAB n/a

## 2019-10-18 NOTE — DIETITIAN INITIAL EVALUATION ADULT. - PROBLEM SELECTOR PLAN 1
- Likely from blood loss due to GI bleed. Patient has a history of GI bleeds, last admission with EGD and capsule endoscopy unable to find source but was thought to have been due to Dieulafoy lesion  - Obtain CT chest as well to r/o bleeding into the chest, particularly in light of left flank pain  - Patient consented  - Type and screen  - 2 large bore IVs  - PPI IV BID is acceptable  - GI consult. May need colonoscopy  - receiving 2U PRBCs, will f/u Hb and trend CBCs Q8H

## 2019-10-18 NOTE — DIETITIAN INITIAL EVALUATION ADULT. - FACTORS AFF FOOD INTAKE
pt reports good PO intake when she can eat.  pt goes in and out of hospital with GIB where she is unable to eat for a period of time.  based on inconsistent PO intake, unable to accurately determine adequacy of PO intake./other (specify)

## 2019-10-18 NOTE — DIETITIAN INITIAL EVALUATION ADULT. - PROBLEM SELECTOR PLAN 2
- Left flank  - Patient had CTA A/P but would want to r/o PE in light of tachycardia, as well as rule out bleeding, as symptoms began in the hospital. Patient is at much higher risk for PE due to paraplegia  - CTA A/P negative for mesenteric ischemia but did note stable non-obstructive 1.1 cm ureteral calculus. Patient does have a history of ureteral calculi  - No evidence of pyelo on imaging  - IVFs and pain control for now while CTA chest pending

## 2019-10-18 NOTE — DIETITIAN INITIAL EVALUATION ADULT. - ADD RECOMMEND
1) as medically feasible, advance diet as tolerated to regular with milkshake TID 2) monitor length NPO, advancement/tolerance nutr source 3) daily wt checks

## 2019-10-18 NOTE — DIETITIAN INITIAL EVALUATION ADULT. - MALNUTRITION
severe malnutrition in acute on chronic illness r/t altered GI function AEB severe edema, severe muscle/mod fat wasting severe malnutrition in acute on chronic illness

## 2019-10-18 NOTE — DIETITIAN INITIAL EVALUATION ADULT. - NAME AND PHONE
Adali Hanks MA, RDN, CDN, [facility]  [phone] (office number)  [phone] (pager number)

## 2019-10-18 NOTE — CHART NOTE - NSCHARTNOTEFT_GEN_A_CORE
Spoke with [facility] staff, Dr. Conway spoke with .  Pt with worsening anemia, source is likely upper due to prior history.  Keep NPO, continue PPIs, and plan for EGD with Dr. Yoo this evening.

## 2019-10-18 NOTE — CHART NOTE - NSCHARTNOTEFT_GEN_A_CORE
Upon Nutritional Assessment by the Registered Dietitian your patient was determined to meet criteria / has evidence of the following diagnosis/diagnoses:          [ ]  Mild Protein Calorie Malnutrition        [ ]  Moderate Protein Calorie Malnutrition        [x] Severe Protein Calorie Malnutrition        [ ] Unspecified Protein Calorie Malnutrition        [ ] Underweight / BMI <19        [ ] Morbid Obesity / BMI > 40      Findings:  severe malnutrition in acute on chronic illness r/t altered GI function AEB severe edema, severe muscle/mod fat wasting    Findings as based on:  •  Comprehensive nutrition assessment and consultation  •  Calorie counts (nutrient intake analysis)  •  Food acceptance and intake status from observations by staff  •  Follow up  •  Patient education  •  Intervention secondary to interdisciplinary rounds  •   concerns      Treatment:    The following diet has been recommended:  1) as medically feasible, advance diet as tolerated to regular with milkshake TID   2) monitor length NPO, advancement/tolerance nutr source   3) daily wt checks    PROVIDER Section:     By signing this assessment you are acknowledging and agree with the diagnosis/diagnoses assigned by the Registered Dietitian    Comments:

## 2019-10-18 NOTE — DIETITIAN INITIAL EVALUATION ADULT. - PROBLEM SELECTOR PLAN 6
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

## 2019-10-20 NOTE — PROGRESS NOTE ADULT - PROBLEM SELECTOR PLAN 1
- Likely from blood loss due to GI bleed. Patient has a history of GI bleeds, last admission with EGD and capsule endoscopy unable to find source but was thought to have been due to Dieulafoy lesion  - CTAP/Chest negative for acute bleed  - ST likely 2/2 to acute blood loss anemia with superimposed volume depletion  - PPI IV BID is acceptable  - D/W Dr Yoo-> s/s strongly suggestive of recurrent bleed d/t Dielafoys  - EGD today  - transfuse as needed  - CLD  - monitor H/H
- Likely from blood loss due to GI bleed. Patient has a history of GI bleeds, last admission with EGD and capsule endoscopy unable to find source but was thought to have been due to Dieulafoy lesion  -REpeat EGD 10/18: no e/o bleed; source unidentifiable  - CTAP/Chest negative for acute bleed  - ST likely 2/2 to acute blood loss anemia with superimposed volume depletion  - PPI IV BID is acceptable  - D/W Dr Yoo-> s/s strongly suggestive of recurrent bleed d/t Dielafoys  -Monitor H/H  -advance diet
- Likely from blood loss due to GI bleed. Patient has a history of GI bleeds, last admission with EGD and capsule endoscopy unable to find source but was thought to have been due to Dieulafoy lesion  -Repeat EGD 10/18: no e/o bleed; source unidentifiable  - CTAP/Chest negative for acute bleed  - ST likely 2/2 to acute blood loss anemia with superimposed volume depletion  - PPI IV BID is acceptable  - D/W Dr Yoo-> s/s strongly suggestive of recurrent bleed d/t Dielafoys  -Monitor H/H  -advance diet
- Likely from blood loss due to GI bleed. Patient has a history of GI bleeds, last admission with EGD and capsule endoscopy unable to find source but was thought to have been due to Dieulafoy lesion  - CTAP/Chest negative for acute bleed  - ST likely 2/2 to acute blood loss anemia with superimposed volume depletion  - PPI IV BID is acceptable  - D/W Dr Yoo-> s/s strongly suggestive of recurrent bleed d/t Dielafoys  - EGD if rapidly deteriorates  - CLD  - monitor H/H

## 2019-10-20 NOTE — PROGRESS NOTE ADULT - PROBLEM SELECTOR PLAN 6
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
- Due to past spinal hematoma complicated by neuropathic pain  - On Baclofen pump. Will also continue Baclofen 20 mg BID  - Will also continue Duloxetine 30 mg BID and Gabapentin 800 mg TID

## 2019-10-20 NOTE — PROGRESS NOTE ADULT - PROBLEM SELECTOR PLAN 2
- Left flank - resolved  - CTA A/P negative for mesenteric ischemia but did note stable non-obstructive 1.1 cm ureteral calculus. Patient does have a history of ureteral calculi  - No evidence of pyelo on imaging  - IVFs and pain control
- Patient with large amount of stool with evidence of stercoral proctitis on CT  - Patient with marked leukocytosis, tachycardia, and low grade temperatures  - Patient also with distended, tense abdomen. Lactate normal  - Serial abdominal exams  - Cont Zosyn  - F/u blood cx
- Patient with large amount of stool with evidence of stercoral proctitis on CT  - Patient with marked leukocytosis, tachycardia, and low grade temperatures  - Patient also with distended, tense abdomen. Lactate normal  - Serial abdominal exams  - Will give Zosyn  - F/u blood cx  - Will give Miralax and Colace standing, monitor BMs
- Patient with large amount of stool with evidence of stercoral proctitis on CT  - Patient with marked leukocytosis, tachycardia, and low grade temperatures  - Patient also with distended, tense abdomen. Lactate normal  - Serial abdominal exams  - Cont Zosyn  - F/u blood cx  - KUB today for poss fecal impaction

## 2019-10-20 NOTE — PROGRESS NOTE ADULT - PROBLEM SELECTOR PLAN 7
- Due to past spinal hematoma complicated by neuropathic pain  - On Baclofen pump. Will also continue Baclofen 20 mg BID  - Will also continue Duloxetine 30 mg BID and Gabapentin 800 mg TID
- C/w Labetalol with high hold parameters

## 2019-10-20 NOTE — PROGRESS NOTE ADULT - ASSESSMENT
Imp:  Recurrent UGI bleeding, H/H stable today  Recent fecal impaction    Rec:  Check KUB to f/u impaction

## 2019-10-20 NOTE — PROGRESS NOTE ADULT - PROBLEM SELECTOR PLAN 4
- Due to leukocytosis, tachycardia  - Likely due to stercoral proctitis complicated by bleeding  - S/p 2U PRBCs  - Lactate normal  - Plan as noted above, will add IVFs after PRBCs  - UA shows bacteria but with epithelial cells, likely contaminant
- Likely prerenal from acute blood loss  - resolved

## 2019-10-20 NOTE — PROGRESS NOTE ADULT - ATTENDING COMMENTS
Dispo: TEENA for fecal impaction. Final GI recc as patient freqeuntly admitted for same reason. F/U H/H. Pt needs social work to discuss ancillary care at home. Has Aide 6x week for limited number of hours. Has no other support system at home

## 2019-10-20 NOTE — PROGRESS NOTE ADULT - PROBLEM SELECTOR PLAN 3
- Patient with large amount of stool with evidence of stercoral proctitis on CT  - Patient with marked leukocytosis, tachycardia, and low grade temperatures  - Patient also with distended, tense abdomen. Lactate normal  - Serial abdominal exams  - Will give Zosyn  - F/u blood cx  - Will give Miralax and Colace standing, monitor BMs
- Due to leukocytosis, tachycardia  - Likely due to stercoral proctitis complicated by bleeding  - S/p 3U PRBCs  - Lactate normal  - Plan as noted above, will add IVFs after PRBCs  - UA shows bacteria but with epithelial cells, likely contaminant

## 2019-10-20 NOTE — PROGRESS NOTE ADULT - PROBLEM SELECTOR PLAN 8
- C/w Labetalol with high hold parameters
- DVT PPX: IMPROVE score of 3, but due to suspected bleeding, hold off pharmacological PPX. Will give IPCs instead  - Diet: NPO for now  - Dispo: pending GI workup

## 2019-10-20 NOTE — PROGRESS NOTE ADULT - PROBLEM SELECTOR PLAN 5
- Likely prerenal from acute blood loss  - F/u repeat creatinine after PRBCs and IVFs  - Also given IV contrast for CTA x2
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

## 2019-10-21 NOTE — PHYSICAL THERAPY INITIAL EVALUATION ADULT - ADDITIONAL COMMENTS
Pt is a non-ambulatory paraplegic, at baseline she transfers from bed to wheelchair via pop-over transfer or sliding board transfer. Pt states she is able to self propel her manual w/c.

## 2019-10-21 NOTE — PHYSICAL THERAPY INITIAL EVALUATION ADULT - RANGE OF MOTION EXAMINATION, REHAB EVAL
Pt lacks terminal knee extension bilaterally, pt lacking DF ~10 deg to neutral bilaterally/bilateral upper extremity ROM was WFL (within functional limits)

## 2019-10-21 NOTE — PROGRESS NOTE ADULT - ASSESSMENT
67 yo F with a PMH recurrent GI bleeds, aortic dissection s/p multiple repairs, spinal hematoma resulting in paraplegia (on baclofen pump), CVA, recurrent UTIs, HTN, PAD, and HSV endophthalmitis/keratitis s/p left corneal transplant who presents with hypotension and hemoglobin drop due to suspected bleed.    Problem/Plan - 1:  ·  Problem: Acute on chronic anemia.  Plan: - Likely from blood loss due to GI bleed. Patient has a history of GI bleeds, last admission with EGD and capsule endoscopy unable to find source but was thought to have been due to Dieulafoy lesion  -Repeat EGD 10/18: no e/o bleed; source unidentifiable  - CTAP/Chest negative for acute bleed  - ST likely 2/2 to acute blood loss anemia with superimposed volume depletion  - PPI IV BID is acceptable  - D/W Dr Yoo-> s/s strongly suggestive of recurrent bleed d/t Dielafoys  -Monitor H/H  -advance diet.     Problem/Plan - 2:  ·  Problem: Proctitis.  Plan: - Patient with large amount of stool with evidence of stercoral proctitis on CT  - Patient with marked leukocytosis, tachycardia, and low grade temperatures  - Patient also with distended, tense abdomen. Lactate normal  - Serial abdominal exams  - Cont Zosyn  - F/u blood cx  - KUB today for poss fecal impaction.     Problem/Plan - 3:  ·  Problem: Sepsis.  Plan: - Due to leukocytosis, tachycardia  - Likely due to stercoral proctitis complicated by bleeding  - S/p 3U PRBCs  - Lactate normal  - Plan as noted above, will add IVFs after PRBCs  - UA shows bacteria but with epithelial cells, likely contaminant.   Problem/Plan - 4:  ·  Problem: Acute kidney injury.  Plan: - Likely prerenal from acute blood loss  - resolved.     Problem/Plan - 5:  ·  Problem: Keratitis, herpetic.  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     Problem/Plan - 6:  Problem: Paraplegia. Plan: - Due to past spinal hematoma complicated by neuropathic pain  - On Baclofen pump. Will also continue Baclofen 20 mg BID  - Will also continue Duloxetine 30 mg BID and Gabapentin 800 mg TID.    Problem/Plan - 7:  ·  Problem: HTN (Hypertension).  Plan: - C/w Labetalol with high hold parameters.     Problem/Plan - 8:  ·  Problem: Prophylactic measure.  Plan: - DVT PPX: IMPROVE score of 3, but due to suspected bleeding, hold off pharmacological PPX. Will give IPCs instead  - Diet: NPO for now  - Dispo: pending GI workup.     Problem/Plan - 9:  ·  Problem: Malnutrition.     Attending Attestation:   Dispo: KUB for fecal impaction. Final GI recc as patient freqeuntly admitted for same reason. F/U H/H. Pt needs social work to discuss ancillary care at home. Has Aide 6x week for limited number of hours. Has no other support system at home.     Plan discussed with GI and patient.  DC Planning - home likely tomorrow

## 2019-10-21 NOTE — PHYSICAL THERAPY INITIAL EVALUATION ADULT - PERTINENT HX OF CURRENT PROBLEM, REHAB EVAL
69 yo F with a PMH recurrent GI bleeds, aortic dissection s/p multiple repairs, spinal hematoma resulting in paraplegia (on baclofen pump), CVA, recurrent UTIs, HTN, PAD, and HSV endophthalmitis/keratitis s/p left corneal transplant who presents with hypotension. Her  checked her BP at home yesterday and it was low, which was why he brought her to the ED. She had been discharged earlier that morning from [facility] after having been hospitalized for GI bleeding/melena.

## 2019-10-21 NOTE — PROGRESS NOTE ADULT - ASSESSMENT
69 y/o female with recurrent gi bleeding s/p egd with Dr. Yoo noting gastritis.  No evident of active gi bleeding noted-No BM overnight-feels constipated.   Imp:  Recurrent GI bleeding, H/H stable today.  Recent fecal impaction  Plan:  Check KUB today-it was done yesterday.  Will likely benefit from a mineral oil enema as patient needs regular disimpaction.  Discussed with Dr. Yoo/nursing.

## 2019-10-22 NOTE — PROGRESS NOTE ADULT - ASSESSMENT
69 yo F with a PMH recurrent GI bleeds, aortic dissection s/p multiple repairs, spinal hematoma resulting in paraplegia (on baclofen pump), CVA, recurrent UTIs, HTN, PAD, and HSV endophthalmitis/keratitis s/p left corneal transplant who presents with hypotension and hemoglobin drop due to suspected bleed.    Problem/Plan - 1:  ·  Problem: Acute on chronic anemia.  Plan: - Likely from blood loss due to GI bleed. Patient has a history of GI bleeds, last admission with EGD and capsule endoscopy unable to find source but was thought to have been due to Dieulafoy lesion  -Repeat EGD 10/18: no e/o bleed; source unidentifiable  - CTAP/Chest negative for acute bleed  - PPI IV BID is acceptable  - D/W Dr Yoo-> s/s strongly suggestive of recurrent bleed d/t Dielafoys  - 10/22: for RPT EGD after bloody BM today.  Hold off on PRBCs now; Keep Hb 8 and above - pt asymptomatic    Problem/Plan - 2:  ·  Problem: Proctitis.  Plan: - Patient with large amount of stool with evidence of stercoral proctitis on CT  - Patient with marked leukocytosis, tachycardia, and low grade temperatures  - Patient also with distended, tense abdomen. Lactate normal  - Serial abdominal exams  - Cont Zosyn  - F/u blood cx  - KUB today for poss fecal impaction - improving    Problem/Plan - 3:  ·  Problem: Sepsis.  Plan: - Due to leukocytosis, tachycardia  - Likely due to stercoral proctitis complicated by bleeding  - S/p 3U PRBCs  - Lactate normal  - Plan as noted above, will add IVFs after PRBCs  - UA shows bacteria but with epithelial cells, likely contaminant.     Problem/Plan - 4:  ·  Problem: Acute kidney injury.  Plan: - Likely prerenal from acute blood loss  - resolved.     Problem/Plan - 5:  ·  Problem: Keratitis, herpetic.  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     Problem/Plan - 6:  Problem: Paraplegia. Plan: - Due to past spinal hematoma complicated by neuropathic pain  - On Baclofen pump. Will also continue Baclofen 20 mg BID  - Will also continue Duloxetine 30 mg BID and Gabapentin 800 mg TID.    Problem/Plan - 7:  ·  Problem: HTN (Hypertension).  Plan: - C/w Labetalol with high hold parameters.     Problem/Plan - 8:  ·  Problem: Prophylactic measure.  Plan: - DVT PPX: IMPROVE score of 3, but due to suspected bleeding, hold off pharmacological PPX. Will give IPCs instead  - Diet: NPO for now  - Dispo: pending GI workup  Problem/Plan - 9:  ·  Problem: Malnutrition.     Dispo: KUB for fecal impaction. Final GI recc as patient freqeuntly admitted for same reason. F/U H/H. Pt needs social work to discuss ancillary care at home.   Has Aide 6x week for limited number of hours. Has no other support system at home.     Plan discussed with GI NP and patient.  DC Planning - home when improved

## 2019-10-22 NOTE — PROGRESS NOTE ADULT - ASSESSMENT
69 y/o female with recurrent gi bleeding s/p egd with Dr. Yoo noting gastritis.  No evident of active gi bleeding noted.   Recurrent GI bleeding, H/H stable yesterday, check am labs.   Recent fecal impaction - kub noted w/ improvement.   Discussed with Dr. Yoo.

## 2019-10-23 NOTE — PROGRESS NOTE ADULT - ASSESSMENT
69 yo F with a PMH recurrent GI bleeds, aortic dissection s/p multiple repairs, spinal hematoma resulting in paraplegia (on baclofen pump), CVA, recurrent UTIs, HTN, PAD, and HSV endophthalmitis/keratitis s/p left corneal transplant who presents with hypotension and hemoglobin drop due to suspected bleed.    Problem/Plan - 1:  ·  Problem: Acute on chronic anemia.  Plan: - Likely from blood loss due to GI bleed. Patient has a history of GI bleeds, last admission with EGD and capsule endoscopy unable to find source but was thought to have been due to Dieulafoy lesion  -Repeat EGD 10/18: no e/o bleed; source unidentifiable  - CTAP/Chest negative for acute bleed  - PPI IV BID is acceptable  - D/W Dr Yoo-> s/s strongly suggestive of recurrent bleed d/t Dielafoys  - 10/23 - Hb low despite CTA A/P negative for bleed  PRBCs now; Keep Hb 8 and above - pt asymptomatic at this time    Problem/Plan - 2:  ·  Problem: Proctitis.  Plan: - Patient with large amount of stool with evidence of stercoral proctitis on CT  - Patient with marked leukocytosis, tachycardia, and low grade temperatures  - Patient also with distended, tense abdomen. Lactate normal  - Serial abdominal exams  - Cont Zosyn  - F/u blood cx  - KUB today for poss fecal impaction - improving    Problem/Plan - 3:  ·  Problem: Sepsis.  Plan: - Due to leukocytosis, tachycardia  - Likely due to stercoral proctitis complicated by bleeding  - S/p 3U PRBCs  - Lactate normal  - Plan as noted above, will add IVFs after PRBCs  - UA shows bacteria but with epithelial cells, likely contaminant.     Problem/Plan - 4:  ·  Problem: Acute kidney injury.  Plan: - Likely prerenal from acute blood loss  - resolved.     Problem/Plan - 5:  ·  Problem: Keratitis, herpetic.  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     Problem/Plan - 6:  Problem: Paraplegia. Plan: - Due to past spinal hematoma complicated by neuropathic pain  - On Baclofen pump. Will also continue Baclofen 20 mg BID  - Will also continue Duloxetine 30 mg BID and Gabapentin 800 mg TID.  Problem/Plan - 7:  ·  Problem: HTN (Hypertension).  Plan: - C/w Labetalol with high hold parameters.     Problem/Plan - 8:  ·  Problem: Prophylactic measure.  Plan: - DVT PPX: IMPROVE score of 3, but due to suspected bleeding, hold off pharmacological PPX. Will give IPCs instead  - Diet: NPO for now  - Dispo: pending GI workup  Problem/Plan - 9:  ·  Problem: Malnutrition.     Dispo: KUB for fecal impaction. Final GI recc as patient freqeuntly admitted for same reason. F/U H/H. Pt needs social work to discuss ancillary care at home.   Has Aide 6x week for limited number of hours. Has no other support system at home.     f/u Urology as OP - Jorge Lares MD for calculus along the course of the stent    Plan discussed with RN and patient.  DC Planning - home when improved - tomorrow

## 2019-10-23 NOTE — PROGRESS NOTE ADULT - ASSESSMENT
69 y/o female with recurrent gi bleeding s/p egd with Dr. Yoo noting gastritis.  No evidence of active gi bleeding noted.   CTA negative.   H/H stable.   Advanced diet.   If no further gi bleeding today and h/h remains stable.  Consider d/c today vs. tomorrow?  Discussed with pt, Dr. Conway.

## 2019-10-23 NOTE — CDI QUERY NOTE - NSCDIOTHERTXTBX_GEN_ALL_CORE_HH
Admitted on 10/17 This patient is documented with ANGELIKA    Creatinine Trend:  Creatinine, Serum: 0.42 mg/dL <L> [0.50 - 1.30] (10-23-19)  Creatinine, Serum: 0.33 mg/dL <L> [0.50 - 1.30] (10-22-19)  Creatinine, Serum: 0.50 mg/dL [0.50 - 1.30] (10-21-19)  Creatinine, Serum: 0.47 mg/dL <L> [0.50 - 1.30] (10-18-19)  Creatinine, Serum: 0.40 mg/dL <L> [0.50 - 1.30] (10-17-19)    [facility] policy based on KDIGO guidelines defines ANGELIKA (applicable to both adult and pediatric patients) as any of the following;  •	Increase Creatinine = 0.3 mg/dl from baseline within 48 hours; or  •	Increase in Creatinine level to = 1.5x baseline, which is known or presumed to have occurred within the prior 7 days; or    According to clinical guidelines, clinical criteria must support documented clinical diagnoses.    Can you please clarify the clinical indicators supporting the diagnosis of ANGELIKA or clarify that ANGELIKA has been ruled out?  a) ANGELIKA ruled-out , does not meet KDIGO criteria  b)) ANGELIKA ruled-in, please provide baseline creatinine  c) other, please clarify

## 2019-10-24 NOTE — CDI QUERY NOTE - NSCDIOTHERTXTBX_GEN_ALL_CORE_HH
Admitted on 10/17 This patient is documented with ANGELIKA    Creatinine Trend:  Creatinine, Serum: 0.42 mg/dL <L> [0.50 - 1.30] (10-23-19)  Creatinine, Serum: 0.33 mg/dL <L> [0.50 - 1.30] (10-22-19)  Creatinine, Serum: 0.50 mg/dL [0.50 - 1.30] (10-21-19)  Creatinine, Serum: 0.47 mg/dL <L> [0.50 - 1.30] (10-18-19)  Creatinine, Serum: 0.40 mg/dL <L> [0.50 - 1.30] (10-17-19)    [facility] policy based on KDIGO guidelines defines ANGELIKA (applicable to both adult and pediatric patients) as any of the following;  •	Increase Creatinine = 0.3 mg/dl from baseline within 48 hours; or  •	Increase in Creatinine level to = 1.5x baseline, which is known or presumed to have occurred within the prior 7 days; or    According to clinical guidelines, clinical criteria must support documented clinical diagnoses.    Can you please clarify the clinical indicators supporting the diagnosis of ANGELIKA or clarify that ANGELIKA has been ruled out?  a) ANGELIKA ruled-out , does not meet KDIGO criteria  b)) ANGELIKA ruled-in, please provide baseline creatinine  c) other, please clarify

## 2019-10-24 NOTE — PROGRESS NOTE ADULT - ASSESSMENT
67 yo F with a PMH recurrent GI bleeds, aortic dissection s/p multiple repairs, spinal hematoma resulting in paraplegia (on baclofen pump), CVA, recurrent UTIs, HTN, PAD, and HSV endophthalmitis/keratitis s/p left corneal transplant who presents with hypotension and hemoglobin drop due to suspected bleed.    Problem/Plan - 1:  ·  Problem: Acute on chronic anemia.  Plan: - Likely from blood loss due to GI bleed. Patient has a history of GI bleeds, last admission with EGD and capsule endoscopy unable to find source but was thought to have been due to Dieulafoy lesion  -Repeat EGD 10/18: no e/o bleed; source unidentifiable  - CTAP/Chest negative for acute bleed  - PPI IV BID is acceptable  - D/W Dr Yoo-> s/s strongly suggestive of recurrent bleed d/t Dielafoys  - 10/23 - Hb low despite CTA A/P negative for bleed  PRBCs now; Keep Hb 8 and above - pt asymptomatic at this time  -10/24 - Long discussion with patient and her  today - understandably worried she will go home and bleed again, discussed with them re current stable Hb etc, agreed that we would transfuse to goal HB 9 in her case and then dc home. Per  she has in the been discharged with Hb 7.9 and returned with bloody BMs the same day.    Problem/Plan - 2:  ·  Problem: Proctitis.  Plan: - Patient with large amount of stool with evidence of stercoral proctitis on CT  - Patient with marked leukocytosis, tachycardia, and low grade temperatures  - Patient also with distended, tense abdomen. Lactate normal  - Serial abdominal exams  - Cont Zosyn  - F/u blood cx  - KUB today for poss fecal impaction - improving    Problem/Plan - 3:  ·  Problem: Sepsis.  Plan: - Due to leukocytosis, tachycardia  - Likely due to stercoral proctitis complicated by bleeding  - S/p 3U PRBCs  - Lactate normal  - Plan as noted above, will add IVFs after PRBCs  - UA shows bacteria but with epithelial cells, likely contaminant.     Problem/Plan - 4:  ·  Problem: Acute kidney injury.  Plan: - Likely prerenal from acute blood loss  - resolved.     Problem/Plan - 5:  ·  Problem: Keratitis, herpetic.  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     Problem/Plan - 6:  Problem: Paraplegia. Plan: - Due to past spinal hematoma complicated by neuropathic pain  - On Baclofen pump. Will also continue Baclofen 20 mg BID  - Will also continue Duloxetine 30 mg BID and Gabapentin 800 mg TID.  Problem/Plan - 7:  ·  Problem: HTN (Hypertension).  Plan: - C/w Labetalol with high hold parameters.     Problem/Plan - 8:  ·  Problem: Prophylactic measure.  Plan: - DVT PPX: IMPROVE score of 3, but due to suspected bleeding, hold off pharmacological PPX. Will give IPCs instead  - Diet: NPO for now  - Dispo: pending GI workup  Problem/Plan - 9:  ·  Problem: Malnutrition.     Dispo: KUB for fecal impaction. Final GI recc as patient freqeuntly admitted for same reason. F/U H/H. Pt needs social work to discuss ancillary care at home.   Has Aide 6x week for limited number of hours. Has no other support system at home.     f/u Urology as OP - Jorge Lares MD for calculus along the course of the stent    Plan discussed with RN   DC Planning - home when improved - tomorrow 67 yo F with a PMH recurrent GI bleeds, aortic dissection s/p multiple repairs, spinal hematoma resulting in paraplegia (on baclofen pump), CVA, recurrent UTIs, HTN, PAD, and HSV endophthalmitis/keratitis s/p left corneal transplant who presents with hypotension and hemoglobin drop due to suspected bleed.    Problem/Plan - 1:  ·  Problem: Acute on chronic anemia.  Plan: - Likely from blood loss due to GI bleed. Patient has a history of GI bleeds, last admission with EGD and capsule endoscopy unable to find source but was thought to have been due to Dieulafoy lesion  -Repeat EGD 10/18: no e/o bleed; source unidentifiable  - CTAP/Chest negative for acute bleed  - PPI IV BID is acceptable  - D/W Dr Yoo-> s/s strongly suggestive of recurrent bleed d/t Dielafoys  - 10/23 - Hb low despite CTA A/P negative for bleed  PRBCs now; Keep Hb 8 and above - pt asymptomatic at this time  -10/24 - Long discussion with patient and her  today - understandably worried she will go home and bleed again, discussed with them re current stable Hb etc, agreed that we would transfuse to goal HB 9 in her case and then dc home. Per  she has in the been discharged with Hb 7.9 and returned with bloody BMs the same day.    Problem/Plan - 2:  ·  Problem: Proctitis.  Plan: - Patient with large amount of stool with evidence of stercoral proctitis on CT  - Patient with marked leukocytosis, tachycardia, and low grade temperatures  - Patient also with distended, tense abdomen. Lactate normal  - Serial abdominal exams  - Cont Zosyn  - F/u blood cx  - KUB today for poss fecal impaction - improving    Problem/Plan - 3:  ·  Problem: Sepsis.  Plan: - Due to leukocytosis, tachycardia  - Likely due to stercoral proctitis complicated by bleeding  - S/p 3U PRBCs  - Lactate normal  - Plan as noted above, will add IVFs after PRBCs  - UA shows bacteria but with epithelial cells, likely contaminant.     Problem/Plan - 4:  ·  Problem: Keratitis, herpetic.  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     Problem/Plan - 5:  Problem: Paraplegia. Plan: - Due to past spinal hematoma complicated by neuropathic pain  - On Baclofen pump. Will also continue Baclofen 20 mg BID  - Will also continue Duloxetine 30 mg BID and Gabapentin 800 mg TID.  Problem/Plan - 6:  ·  Problem: HTN (Hypertension).  Plan: - C/w Labetalol with high hold parameters.     Problem/Plan - 7:  ·  Problem: Prophylactic measure.  Plan: - DVT PPX: IMPROVE score of 3, but due to suspected bleeding, hold off pharmacological PPX. Will give IPCs instead  - Diet: NPO for now  - Dispo: pending GI workup  Problem/Plan - 8:  ·  Problem: Malnutrition.     Dispo: KUB for fecal impaction. Final GI recc as patient freqeuntly admitted for same reason. F/U H/H. Pt needs social work to discuss ancillary care at home.   Has Aide 6x week for limited number of hours. Has no other support system at home.     f/u Urology as OP - Jorge Lares MD for calculus along the course of the stent    Plan discussed with RN   DC Planning - home when improved - tomorrow    **correction - pt does not meet criteria for [facility]

## 2019-10-24 NOTE — PROVIDER CONTACT NOTE (OTHER) - SITUATION
Spoke to Lizzie regarding urology consult.
DR.ZINKIN [facility] AWARE OF CONSULT.
Possible GI Bleed
Spoke with Ann Marie in office to inform dr that patient is in [facility].  Please fax discharge papaers to [phone].

## 2019-10-24 NOTE — PROGRESS NOTE ADULT - ASSESSMENT
67yo with recurrent gi bleeding  also with stercoral proctitis due to severe obstipation - now resolved  no evidence bleeding  if h/h improved and no further bleeding, d/c planning

## 2019-10-25 NOTE — PROGRESS NOTE ADULT - PROVIDER SPECIALTY LIST ADULT
Gastroenterology
Hospitalist
Gastroenterology
Hospitalist

## 2019-10-25 NOTE — DISCHARGE NOTE PROVIDER - CARE PROVIDERS DIRECT ADDRESSES
,DirectAddress_Unknown,[email or web address],DirectAddress_Unknown

## 2019-10-25 NOTE — DISCHARGE NOTE NURSING/CASE MANAGEMENT/SOCIAL WORK - PATIENT PORTAL LINK FT
You can access the FollowMyHealth Patient Portal offered by [facility] by registering at the following website: [email or web address]. By joining [facility]’s FollowMyHealth portal, you will also be able to view your health information using other applications (apps) compatible with our system.

## 2019-10-25 NOTE — PROGRESS NOTE ADULT - SUBJECTIVE AND OBJECTIVE BOX
Patient is a 68y old  Female who presents with a chief complaint of anemia, hypotension (17 Oct 2019 10:36)      SUBJECTIVE:   HPI:  67 yo F with a PMH recurrent GI bleeds, aortic dissection s/p multiple repairs, spinal hematoma resulting in paraplegia (on baclofen pump), CVA, recurrent UTIs, HTN, PAD, and HSV endophthalmitis/keratitis s/p left corneal transplant who presents with hypotension. Her  checked her BP at home yesterday and it was low, which was why he brought her to the ED. She had been discharged earlier that morning from [facility] after having been hospitalized for GI bleeding/melena. She had an endoscopy x2 and capsule endoscopy that showed no source of bleeding. She also received 1U PRBCs.  She denies having symptoms after discharge, and does not know how low her BP was. She denies fevers, chills, chest pain, cough, or SOB. She has had a BM every day, and is not sure if it is liquid or loose and is not sure if she is still having blood in her stool. She denies dysuria or hematuria. Since coming into the ED, she has endorsed an achy, intermittent left flank pain radiating frontward into her abdomen. She describes the pain severity as "severe," but is unable to further quantify or qualify it.    In the ED, she was given Acetaminophen 650 mg PO x1, Zosyn IV x1, and 2U PRBCs. (17 Oct 2019 06:12)    sub: melena o/n with drop in H/H given 1 unit prbc repeat 7.7        REVIEW OF SYSTEMS:    CONSTITUTIONAL: No weakness, fevers or chills  EYES/ENT: No visual changes;  No vertigo or throat pain   NECK: No pain or stiffness  RESPIRATORY: No cough, wheezing, hemoptysis; No shortness of breath  CARDIOVASCULAR: No chest pain or palpitations  GASTROINTESTINAL: No abdominal or epigastric pain. No nausea, vomiting, or hematemesis; No diarrhea or constipation. No melena or hematochezia.  GENITOURINARY: No dysuria, frequency or hematuria  NEUROLOGICAL: No numbness or weakness  SKIN: No itching, burning, rashes, or lesions   All other review of systems is negative unless indicated above    ICU Vital Signs Last 24 Hrs  T(C): 36.3 (18 Oct 2019 09:43), Max: 37.6 (17 Oct 2019 14:00)  T(F): 97.4 (18 Oct 2019 09:43), Max: 99.7 (17 Oct 2019 14:00)  HR: 84 (18 Oct 2019 12:00) (84 - 133)  BP: 113/48 (18 Oct 2019 12:00) (85/38 - 154/82)  BP(mean): 65 (18 Oct 2019 12:00) (51 - 102)  ABP: --  ABP(mean): --  RR: 11 (18 Oct 2019 12:00) (11 - 19)  SpO2: 97% (18 Oct 2019 12:00) (94% - 100%)        CAPILLARY BLOOD GLUCOSE          PHYSICAL EXAM:    Constitutional: NAD, awake and alert weak  HEENT: PERR, EOMI, Normal Hearing, MMM  Neck: Soft and supple, No LAD, No JVD  Respiratory: Breath sounds are clear bilaterally, No wheezing, rales or rhonchi  Cardiovascular: S1 and S2, regular rate and rhythm, no Murmurs, gallops or rubs  Gastrointestinal: Bowel Sounds present, soft, nontender, nondistended, no guarding, no rebound  Extremities: No peripheral edema  Vascular: 2+ peripheral pulses  Neurological: A/O x 3, no focal deficits  Musculoskeletal: functional quadriplegic  Skin: No rashes    MEDICATIONS:  MEDICATIONS  (STANDING):  artificial  tears Solution 1 Drop(s) Both EYES every 4 hours  ascorbic acid 500 milliGRAM(s) Oral daily  atorvastatin 40 milliGRAM(s) Oral at bedtime  baclofen 20 milliGRAM(s) Oral two times a day  docusate sodium 100 milliGRAM(s) Oral three times a day  DULoxetine 30 milliGRAM(s) Oral two times a day  gabapentin 800 milliGRAM(s) Oral three times a day  labetalol 200 milliGRAM(s) Oral two times a day  lidocaine   Patch 1 Patch Transdermal daily  multivitamin 1 Tablet(s) Oral daily  ofloxacin 0.3% Solution 1 Drop(s) Left EYE four times a day  pantoprazole  Injectable 40 milliGRAM(s) IV Push two times a day  piperacillin/tazobactam IVPB.. 3.375 Gram(s) IV Intermittent every 8 hours  polyethylene glycol 3350 17 Gram(s) Oral daily  potassium chloride    Tablet ER 40 milliEquivalent(s) Oral once  prednisoLONE acetate 1% Suspension 1 Drop(s) Left EYE four times a day  valACYclovir 1000 milliGRAM(s) Oral three times a day      LABS: All Labs Reviewed:                        9.0    16.02 )-----------( 222      ( 17 Oct 2019 08:41 )             26.4     10-17    136  |  104  |  37<H>  ----------------------------<  111<H>  3.4<L>   |  22  |  0.40<L>    Ca    7.6<L>      17 Oct 2019 08:41  Phos  3.7     10-17  Mg     1.7     10-17    TPro  4.7<L>  /  Alb  1.9<L>  /  TBili  0.3  /  DBili  x   /  AST  26  /  ALT  21  /  AlkPhos  71  10-16    PT/INR - ( 17 Oct 2019 02:15 )   PT: 12.7 sec;   INR: 1.14 ratio         PTT - ( 16 Oct 2019 19:11 )  PTT:27.8 sec          Blood Culture:     RADIOLOGY/EKG:    < from: CT Angio Chest PE Protocol w/ IV Cont (10.17.19 @ 08:00) >    IMPRESSION:     No pulmonary embolism.    Status post ascending aortic repair with stable residual dissection at   the arch and extending into the right brachiocephalic and common carotid   arteries.    Unchanged penetrating ulcer versus pseudoaneurysm in the mid descending   aorta.    Unchanged bilateral small airways disease.    < end of copied text >  < from: CT Angio Abdomen and Pelvis w/ IV Cont (10.16.19 @ 19:50) >    IMPRESSION:     No active bleeding.    Large fecal retention throughout the colon and rectum with evidence of   stercoral proctitis.    Fluid and debris distention of the stomach. Fluid distention of the lower   esophagus. Aspiration precautions are advised.    < end of copied text >
Patient is a 68y old  Female who presents with a chief complaint of anemia, hypotension (17 Oct 2019 10:36)      SUBJECTIVE:   HPI:  67 yo F with a PMH recurrent GI bleeds, aortic dissection s/p multiple repairs, spinal hematoma resulting in paraplegia (on baclofen pump), CVA, recurrent UTIs, HTN, PAD, and HSV endophthalmitis/keratitis s/p left corneal transplant who presents with hypotension. Her  checked her BP at home yesterday and it was low, which was why he brought her to the ED. She had been discharged earlier that morning from [facility] after having been hospitalized for GI bleeding/melena. She had an endoscopy x2 and capsule endoscopy that showed no source of bleeding. She also received 1U PRBCs.  She denies having symptoms after discharge, and does not know how low her BP was. She denies fevers, chills, chest pain, cough, or SOB. She has had a BM every day, and is not sure if it is liquid or loose and is not sure if she is still having blood in her stool. She denies dysuria or hematuria. Since coming into the ED, she has endorsed an achy, intermittent left flank pain radiating frontward into her abdomen. She describes the pain severity as "severe," but is unable to further quantify or qualify it.    In the ED, she was given Acetaminophen 650 mg PO x1, Zosyn IV x1, and 2U PRBCs. (17 Oct 2019 06:12)    sub: recurrent UGIB. H/H stable. EGD did not localize source of bleed        REVIEW OF SYSTEMS:    CONSTITUTIONAL: No weakness, fevers or chills  EYES/ENT: No visual changes;  No vertigo or throat pain   NECK: No pain or stiffness  RESPIRATORY: No cough, wheezing, hemoptysis; No shortness of breath  CARDIOVASCULAR: No chest pain or palpitations  GASTROINTESTINAL: No abdominal or epigastric pain. No nausea, vomiting, or hematemesis; No diarrhea or constipation. No melena or hematochezia.  GENITOURINARY: No dysuria, frequency or hematuria  NEUROLOGICAL: No numbness or weakness  SKIN: No itching, burning, rashes, or lesions   All other review of systems is negative unless indicated above    ICU Vital Signs Last 24 Hrs  T(C): 36.1 (19 Oct 2019 09:25), Max: 36.8 (19 Oct 2019 05:00)  T(F): 97 (19 Oct 2019 09:25), Max: 98.2 (19 Oct 2019 05:00)  HR: 75 (19 Oct 2019 06:00) (70 - 88)  BP: 131/60 (19 Oct 2019 06:00) (93/68 - 132/64)  BP(mean): 78 (19 Oct 2019 06:00) (62 - 81)  ABP: --  ABP(mean): --  RR: 15 (19 Oct 2019 06:00) (10 - 25)  SpO2: 99% (19 Oct 2019 06:00) (90% - 100%)      CAPILLARY BLOOD GLUCOSE      PHYSICAL EXAM:    Constitutional: NAD, awake and alert weak  HEENT: PERR, EOMI, Normal Hearing, MMM  Neck: Soft and supple, No LAD, No JVD  Respiratory: Breath sounds are clear bilaterally, No wheezing, rales or rhonchi  Cardiovascular: S1 and S2, regular rate and rhythm, no Murmurs, gallops or rubs  Gastrointestinal: Bowel Sounds present, soft, nontender, nondistended, no guarding, no rebound  Extremities: No peripheral edema  Vascular: 2+ peripheral pulses  Neurological: A/O x 3, no focal deficits  Musculoskeletal: functional quadriplegic  Skin: No rashes    MEDICATIONS:  MEDICATIONS  (STANDING):  artificial  tears Solution 1 Drop(s) Both EYES every 4 hours  ascorbic acid 500 milliGRAM(s) Oral daily  atorvastatin 40 milliGRAM(s) Oral at bedtime  baclofen 20 milliGRAM(s) Oral two times a day  docusate sodium 100 milliGRAM(s) Oral three times a day  DULoxetine 30 milliGRAM(s) Oral two times a day  gabapentin 800 milliGRAM(s) Oral three times a day  labetalol 200 milliGRAM(s) Oral two times a day  lidocaine   Patch 1 Patch Transdermal daily  multivitamin 1 Tablet(s) Oral daily  ofloxacin 0.3% Solution 1 Drop(s) Left EYE four times a day  pantoprazole  Injectable 40 milliGRAM(s) IV Push two times a day  piperacillin/tazobactam IVPB.. 3.375 Gram(s) IV Intermittent every 8 hours  polyethylene glycol 3350 17 Gram(s) Oral daily  potassium chloride    Tablet ER 40 milliEquivalent(s) Oral once  prednisoLONE acetate 1% Suspension 1 Drop(s) Left EYE four times a day  valACYclovir 1000 milliGRAM(s) Oral three times a day      LABS: All Labs Reviewed:                        9.0    16.02 )-----------( 222      ( 17 Oct 2019 08:41 )             26.4     10-17    136  |  104  |  37<H>  ----------------------------<  111<H>  3.4<L>   |  22  |  0.40<L>    Ca    7.6<L>      17 Oct 2019 08:41  Phos  3.7     10-17  Mg     1.7     10-17    TPro  4.7<L>  /  Alb  1.9<L>  /  TBili  0.3  /  DBili  x   /  AST  26  /  ALT  21  /  AlkPhos  71  10-16    PT/INR - ( 17 Oct 2019 02:15 )   PT: 12.7 sec;   INR: 1.14 ratio         PTT - ( 16 Oct 2019 19:11 )  PTT:27.8 sec          Blood Culture:     RADIOLOGY/EKG:    < from: CT Angio Chest PE Protocol w/ IV Cont (10.17.19 @ 08:00) >    IMPRESSION:     No pulmonary embolism.    Status post ascending aortic repair with stable residual dissection at   the arch and extending into the right brachiocephalic and common carotid   arteries.    Unchanged penetrating ulcer versus pseudoaneurysm in the mid descending   aorta.    Unchanged bilateral small airways disease.    < end of copied text >  < from: CT Angio Abdomen and Pelvis w/ IV Cont (10.16.19 @ 19:50) >    IMPRESSION:     No active bleeding.    Large fecal retention throughout the colon and rectum with evidence of   stercoral proctitis.    Fluid and debris distention of the stomach. Fluid distention of the lower   esophagus. Aspiration precautions are advised.    < end of copied text >
Patient is a 68y old  Female who presents with a chief complaint of anemia, hypotension (17 Oct 2019 10:36)      SUBJECTIVE:   HPI:  69 yo F with a PMH recurrent GI bleeds, aortic dissection s/p multiple repairs, spinal hematoma resulting in paraplegia (on baclofen pump), CVA, recurrent UTIs, HTN, PAD, and HSV endophthalmitis/keratitis s/p left corneal transplant who presents with hypotension. Her  checked her BP at home yesterday and it was low, which was why he brought her to the ED. She had been discharged earlier that morning from [facility] after having been hospitalized for GI bleeding/melena. She had an endoscopy x2 and capsule endoscopy that showed no source of bleeding. She also received 1U PRBCs.  She denies having symptoms after discharge, and does not know how low her BP was. She denies fevers, chills, chest pain, cough, or SOB. She has had a BM every day, and is not sure if it is liquid or loose and is not sure if she is still having blood in her stool. She denies dysuria or hematuria. Since coming into the ED, she has endorsed an achy, intermittent left flank pain radiating frontward into her abdomen. She describes the pain severity as "severe," but is unable to further quantify or qualify it.    In the ED, she was given Acetaminophen 650 mg PO x1, Zosyn IV x1, and 2U PRBCs. (17 Oct 2019 06:12)    sub: recurrent UGIB. H/H stable. EGD did not localize source of bleed; abd somewhat distended, no pain. No BM today. + flatus        REVIEW OF SYSTEMS:    CONSTITUTIONAL: No weakness, fevers or chills  EYES/ENT: No visual changes;  No vertigo or throat pain   NECK: No pain or stiffness  RESPIRATORY: No cough, wheezing, hemoptysis; No shortness of breath  CARDIOVASCULAR: No chest pain or palpitations  GASTROINTESTINAL: No abdominal or epigastric pain. No nausea, vomiting, or hematemesis; No diarrhea or constipation. No melena or hematochezia.  GENITOURINARY: No dysuria, frequency or hematuria  NEUROLOGICAL: No numbness or weakness  SKIN: No itching, burning, rashes, or lesions   All other review of systems is negative unless indicated above    ICU Vital Signs Last 24 Hrs  T(C): 37.1 (20 Oct 2019 11:06), Max: 37.2 (19 Oct 2019 22:42)  T(F): 98.7 (20 Oct 2019 11:06), Max: 98.9 (19 Oct 2019 22:42)  HR: 87 (20 Oct 2019 11:06) (73 - 97)  BP: 111/52 (20 Oct 2019 11:06) (111/52 - 161/83)  BP(mean): 102 (19 Oct 2019 19:12) (78 - 102)  ABP: --  ABP(mean): --  RR: 18 (20 Oct 2019 11:06) (14 - 18)  SpO2: 96% (20 Oct 2019 11:06) (87% - 97%)      CAPILLARY BLOOD GLUCOSE      PHYSICAL EXAM:    Constitutional: NAD, awake and alert weak  HEENT: PERR, EOMI, Normal Hearing, MMM  Neck: Soft and supple, No LAD, No JVD  Respiratory: Breath sounds are clear bilaterally, No wheezing, rales or rhonchi  Cardiovascular: S1 and S2, regular rate and rhythm, no Murmurs, gallops or rubs  Gastrointestinal: Bowel Sounds present, soft, but mildly distended, no tenderness  Extremities: No peripheral edema  Vascular: 2+ peripheral pulses  Neurological: A/O x 3, no focal deficits  Musculoskeletal: functional quadriplegic  Skin: No rashes    MEDICATIONS:  MEDICATIONS  (STANDING):  artificial  tears Solution 1 Drop(s) Both EYES every 4 hours  ascorbic acid 500 milliGRAM(s) Oral daily  atorvastatin 40 milliGRAM(s) Oral at bedtime  baclofen 20 milliGRAM(s) Oral two times a day  docusate sodium 100 milliGRAM(s) Oral three times a day  DULoxetine 30 milliGRAM(s) Oral two times a day  gabapentin 800 milliGRAM(s) Oral three times a day  labetalol 200 milliGRAM(s) Oral two times a day  lidocaine   Patch 1 Patch Transdermal daily  multivitamin 1 Tablet(s) Oral daily  ofloxacin 0.3% Solution 1 Drop(s) Left EYE four times a day  pantoprazole  Injectable 40 milliGRAM(s) IV Push two times a day  piperacillin/tazobactam IVPB.. 3.375 Gram(s) IV Intermittent every 8 hours  polyethylene glycol 3350 17 Gram(s) Oral daily  potassium chloride    Tablet ER 40 milliEquivalent(s) Oral once  prednisoLONE acetate 1% Suspension 1 Drop(s) Left EYE four times a day  valACYclovir 1000 milliGRAM(s) Oral three times a day      LABS: All Labs Reviewed:                        9.0    16.02 )-----------( 222      ( 17 Oct 2019 08:41 )             26.4     10-17    136  |  104  |  37<H>  ----------------------------<  111<H>  3.4<L>   |  22  |  0.40<L>    Ca    7.6<L>      17 Oct 2019 08:41  Phos  3.7     10-17  Mg     1.7     10-17    TPro  4.7<L>  /  Alb  1.9<L>  /  TBili  0.3  /  DBili  x   /  AST  26  /  ALT  21  /  AlkPhos  71  10-16    PT/INR - ( 17 Oct 2019 02:15 )   PT: 12.7 sec;   INR: 1.14 ratio         PTT - ( 16 Oct 2019 19:11 )  PTT:27.8 sec          Blood Culture:     RADIOLOGY/EKG:    < from: CT Angio Chest PE Protocol w/ IV Cont (10.17.19 @ 08:00) >    IMPRESSION:     No pulmonary embolism.    Status post ascending aortic repair with stable residual dissection at   the arch and extending into the right brachiocephalic and common carotid   arteries.    Unchanged penetrating ulcer versus pseudoaneurysm in the mid descending   aorta.    Unchanged bilateral small airways disease.    < end of copied text >  < from: CT Angio Abdomen and Pelvis w/ IV Cont (10.16.19 @ 19:50) >    IMPRESSION:     No active bleeding.    Large fecal retention throughout the colon and rectum with evidence of   stercoral proctitis.    Fluid and debris distention of the stomach. Fluid distention of the lower   esophagus. Aspiration precautions are advised.    < end of copied text >
Patient is a 68y old  Female who presents with a chief complaint of anemia, low bp (21 Oct 2019 09:12)    HPI:  69 yo F with a PMH recurrent GI bleeds, aortic dissection s/p multiple repairs, spinal hematoma resulting in paraplegia (on baclofen pump), CVA, recurrent UTIs, HTN, PAD, and HSV endophthalmitis/keratitis s/p left corneal transplant who presents with hypotension. Her  checked her BP at home yesterday and it was low, which was why he brought her to the ED. She had been discharged earlier that morning from [facility] after having been hospitalized for GI bleeding/melena. She had an endoscopy x2 and capsule endoscopy that showed no source of bleeding. She also received 1U PRBCs.  She denies having symptoms after discharge, and does not know how low her BP was. She denies fevers, chills, chest pain, cough, or SOB. She has had a BM every day, and is not sure if it is liquid or loose and is not sure if she is still having blood in her stool. She denies dysuria or hematuria. Since coming into the ED, she has endorsed an achy, intermittent left flank pain radiating frontward into her abdomen. She describes the pain severity as "severe," but is unable to further quantify or qualify it.    In the ED, she was given Acetaminophen 650 mg PO x1, Zosyn IV x1, and 2U PRBCs. (17 Oct 2019 06:12)    10/22/2019-  Pt feels fine, no complaints.   Requesting water.   No n/v, overt gi bleeding.    PAST MEDICAL & SURGICAL HISTORY:  Melena: 10/7/2019  Gastrointestinal hemorrhage: 9/28/2019, 9/4/2019, 8/29/2019  Dieulafoy lesion of stomach or duodenum: 10/1/2019  Subdural hematoma, nontraumatic: spontaneous  Dorsalgia of lumbar region: on pain medication /baclofen po and pump  Self-catheterizes urinary bladder  Anemia: chronic  Uveitis  Osteoporosis  PAD (peripheral artery disease)  Hematoma: spinal treated September 2018  Paraplegia: due to spinal hematoma, on wheelchair goes to physical therapy 2 x weekly  Aortic dissection, thoracic: Type A Repaired 2009  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 2018  UTI (urinary tract infection): recurrent  TIA (transient ischemic attack)  HTN (Hypertension)  History of corneal transplant: left corneal transplant on 5/21/2018  Disorder of spine: unthetethering 2 x  Presence of IVC filter: 2014 ?  S/P aortic dissection repair: Type A Dissection repair /2009   descending aortic aneurysm repair 9/2016  H/O Spinal surgery: laminectomies 2014    MEDICATIONS  (STANDING):  artificial  tears Solution 1 Drop(s) Both EYES every 4 hours  ascorbic acid 500 milliGRAM(s) Oral daily  atorvastatin 40 milliGRAM(s) Oral at bedtime  baclofen 20 milliGRAM(s) Oral two times a day  docusate sodium 100 milliGRAM(s) Oral three times a day  DULoxetine 30 milliGRAM(s) Oral two times a day  gabapentin 800 milliGRAM(s) Oral three times a day  labetalol 200 milliGRAM(s) Oral two times a day  lidocaine   Patch 1 Patch Transdermal daily  multivitamin 1 Tablet(s) Oral daily  ofloxacin 0.3% Solution 1 Drop(s) Left EYE four times a day  pantoprazole  Injectable 40 milliGRAM(s) IV Push two times a day  piperacillin/tazobactam IVPB.. 3.375 Gram(s) IV Intermittent every 8 hours  polyethylene glycol 3350 17 Gram(s) Oral daily  prednisoLONE acetate 1% Suspension 1 Drop(s) Left EYE four times a day  valACYclovir 1000 milliGRAM(s) Oral three times a day    MEDICATIONS  (PRN):  acetaminophen   Tablet .. 650 milliGRAM(s) Oral every 6 hours PRN Mild Pain (1 - 3)  oxyCODONE    IR 10 milliGRAM(s) Oral three times a day PRN Moderate and Severe Pain  sodium chloride 2% Ophthalmic Solution 1 Drop(s) Left EYE four times a day PRN Eye pain  zolpidem 5 milliGRAM(s) Oral at bedtime PRN Insomnia    Allergies  No Known Allergies    FAMILY HISTORY:  No pertinent family history in first degree relatives    REVIEW OF SYSTEMS:  CONSTITUTIONAL: No weakness, fevers or chills  RESPIRATORY: No cough, wheezing, hemoptysis; No shortness of breath  CARDIOVASCULAR: No chest pain or palpitations  GASTROINTESTINAL: Per HPI.     Vital Signs Last 24 Hrs  T(C): 37 (22 Oct 2019 05:44), Max: 37.1 (21 Oct 2019 17:57)  T(F): 98.6 (22 Oct 2019 05:44), Max: 98.7 (21 Oct 2019 17:57)  HR: 73 (22 Oct 2019 06:20) (72 - 77)  BP: 152/64 (22 Oct 2019 06:20) (130/66 - 164/65)  BP(mean): --  RR: 18 (21 Oct 2019 21:38) (16 - 18)  SpO2: 98% (22 Oct 2019 05:44) (94% - 98%)    PHYSICAL EXAM:  Constitutional: Middle aged female in NAD, functional paraplegic.   Respiratory: CTAB  Cardiovascular: S1 and S2, RRR, no M/G/R  Gastrointestinal: BS+, soft, NT/ND    LABS:                        9.5    x     )-----------( x        ( 21 Oct 2019 09:14 )             29.8     10-21    139  |  109<H>  |  9   ----------------------------<  90  3.7   |  26  |  0.50    Ca    8.5      21 Oct 2019 09:14            RADIOLOGY & ADDITIONAL STUDIES:
67 yo F with a PMH recurrent GI bleeds, aortic dissection s/p multiple repairs, spinal hematoma resulting in paraplegia (on baclofen pump), CVA, recurrent UTIs, HTN, PAD, and HSV endophthalmitis/keratitis s/p left corneal transplant who presents with hypotension. Her  checked her BP at home yesterday and it was low, which was why he brought her to the ED. She had been discharged earlier that morning from [facility] after having been hospitalized for GI bleeding/melena. She had an endoscopy x2 and capsule endoscopy that showed no source of bleeding. She also received 1U PRBCs.  She denies having symptoms after discharge, and does not know how low her BP was. She denies fevers, chills, chest pain, cough, or SOB. She has had a BM every day, and is not sure if it is liquid or loose and is not sure if she is still having blood in her stool. She denies dysuria or hematuria. Since coming into the ED, she has endorsed an achy, intermittent left flank pain radiating frontward into her abdomen. She describes the pain severity as "severe," but is unable to further quantify or qualify it.    10/22: bloody BMs this morning, CT A AP, no EGD  10/23: no further bloody BMs; Hb low  no cp palps lightheadedness sob or numbness     PHYSICAL EXAM:  GENERAL: NAD, able to lie flat in bed  HEAD:  Atraumatic, Normocephalic  EYES: EOMI, PERRLA, normal sclera  ENT: Moist mucous membranes  NECK: Supple, No JVD, no nuchal rigidity  CHEST/LUNG: Clear to auscultation bilaterally; No rales, rhonchi, wheezing, or rubs. Unlabored respirations  HEART: Regular rate and rhythm; No murmurs, rubs, or gallops  ABDOMEN: Bowel sounds present; Soft, Nontender, Nondistended. No hepatomegaly  EXTREMITIES:  no pitting bilaterally  NERVOUS SYSTEM:  Alert & Oriented X3, speech clear. Paraplegia  SKIN: No rashes or lesions  LABS: All Labs Reviewed:                        7.4    x     )-----------( x        ( 23 Oct 2019 07:20 )             24.0     10-23    144  |  108  |  10  ----------------------------<  91  4.0   |  29  |  0.42<L>    RADIOLOGY/EKG:  < from: CT Angio Abdomen and Pelvis w/ IV Cont (10.22.19 @ 10:59) >  LOWER CHEST: Small bilateral pleural effusions. Postoperative changes at   the left lung base. Small hiatal hernia. Low-attenuation the blood pool,   consistent with anemia.    KIDNEYS/URETERS: Left renal atrophy. Mild left hydronephrosis. Left   ureteral stent extends from the left renal pelvis into the bladder. A   calculus is present along the course of the stent measuring 1.1 cm.    BOWEL: No bowel obstruction.  Metallic clips   are present along the greater curvature of the stomach. There is no   evidence of active GI bleeding.  PERITONEUM: No ascites.  VESSELS: The main portal vein is at the upper limits normal in size   measuring 1.6 cm. No portal venous thrombosis.    Aortic dissection is present below the level of SMA extending into the   bilateral common and external iliac arteries. Caliber of the aorta is   stable. Findings are grossly unchanged from prior exam.    Infrarenal IVC filter.    < end of copied text >      acetaminophen   Tablet .. 650 milliGRAM(s) Oral every 6 hours PRN  acetaminophen   Tablet .. 650 milliGRAM(s) Oral once  artificial  tears Solution 1 Drop(s) Both EYES every 4 hours  ascorbic acid 500 milliGRAM(s) Oral daily  atorvastatin 40 milliGRAM(s) Oral at bedtime  baclofen 20 milliGRAM(s) Oral two times a day  diphenhydrAMINE   Injectable 25 milliGRAM(s) IV Push once  docusate sodium 100 milliGRAM(s) Oral three times a day  DULoxetine 30 milliGRAM(s) Oral two times a day  furosemide   Injectable 20 milliGRAM(s) IV Push once  gabapentin 800 milliGRAM(s) Oral three times a day  labetalol 200 milliGRAM(s) Oral two times a day  lidocaine   Patch 1 Patch Transdermal daily  multivitamin 1 Tablet(s) Oral daily  ofloxacin 0.3% Solution 1 Drop(s) Left EYE four times a day  oxyCODONE    IR 10 milliGRAM(s) Oral three times a day PRN  pantoprazole  Injectable 40 milliGRAM(s) IV Push two times a day  piperacillin/tazobactam IVPB.. 3.375 Gram(s) IV Intermittent every 8 hours  polyethylene glycol 3350 17 Gram(s) Oral daily  prednisoLONE acetate 1% Suspension 1 Drop(s) Left EYE four times a day  sodium chloride 2% Ophthalmic Solution 1 Drop(s) Left EYE four times a day PRN  valACYclovir 1000 milliGRAM(s) Oral three times a day  zolpidem 5 milliGRAM(s) Oral at bedtime PRN
67 yo F with a PMH recurrent GI bleeds, aortic dissection s/p multiple repairs, spinal hematoma resulting in paraplegia (on baclofen pump), CVA, recurrent UTIs, HTN, PAD, and HSV endophthalmitis/keratitis s/p left corneal transplant who presents with hypotension. Her  checked her BP at home yesterday and it was low, which was why he brought her to the ED. She had been discharged earlier that morning from [facility] after having been hospitalized for GI bleeding/melena. She had an endoscopy x2 and capsule endoscopy that showed no source of bleeding. She also received 1U PRBCs.  She denies having symptoms after discharge, and does not know how low her BP was. She denies fevers, chills, chest pain, cough, or SOB. She has had a BM every day, and is not sure if it is liquid or loose and is not sure if she is still having blood in her stool. She denies dysuria or hematuria. Since coming into the ED, she has endorsed an achy, intermittent left flank pain radiating frontward into her abdomen. She describes the pain severity as "severe," but is unable to further quantify or qualify it.    10/21: recurrent UGIB. H/H stable. EGD did not localize source of bleed; abd somewhat distended, ordered mineral enema for today  no cp palps sob  10/22: bloody BMs this morning, NPO for EGD  no cp palps lightheadedness sob or numbness anywhere    PHYSICAL EXAM:  GENERAL: NAD, able to lie flat in bed  HEAD:  Atraumatic, Normocephalic  EYES: EOMI, PERRLA, normal sclera  ENT: Moist mucous membranes  NECK: Supple, No JVD, no nuchal rigidity  CHEST/LUNG: Clear to auscultation bilaterally; No rales, rhonchi, wheezing, or rubs. Unlabored respirations  HEART: Regular rate and rhythm; No murmurs, rubs, or gallops  ABDOMEN: Bowel sounds present; Soft, Nontender, Nondistended. No hepatomegaly  EXTREMITIES:  no pitting bilaterally  NERVOUS SYSTEM:  Alert & Oriented X3, speech clear. Paraplegia  SKIN: No rashes or lesions    LABS: All Labs Reviewed:                        8.1    x     )-----------( x        ( 22 Oct 2019 10:05 )             25.3     10-22    143  |  109<H>  |  16  ----------------------------<  100<H>  4.0   |  29  |  0.33<L>      RADIOLOGY/EKG:  < from: Xray Kidney Ureter Bladder (10.20.19 @ 13:43) >  IMPRESSION: There is no visible stool in the colon or rectum,    representing a marked improvement compared to prior examination. Note is   made of a left ureteral stent, left abdominal wall spinal stimulator and   additional electronic device overlying the right lower abdominal wall.    acetaminophen   Tablet .. 650 milliGRAM(s) Oral every 6 hours PRN  artificial  tears Solution 1 Drop(s) Both EYES every 4 hours  ascorbic acid 500 milliGRAM(s) Oral daily  atorvastatin 40 milliGRAM(s) Oral at bedtime  baclofen 20 milliGRAM(s) Oral two times a day  docusate sodium 100 milliGRAM(s) Oral three times a day  DULoxetine 30 milliGRAM(s) Oral two times a day  gabapentin 800 milliGRAM(s) Oral three times a day  labetalol 200 milliGRAM(s) Oral two times a day  lidocaine   Patch 1 Patch Transdermal daily  multivitamin 1 Tablet(s) Oral daily  ofloxacin 0.3% Solution 1 Drop(s) Left EYE four times a day  oxyCODONE    IR 10 milliGRAM(s) Oral three times a day PRN  pantoprazole  Injectable 40 milliGRAM(s) IV Push two times a day  piperacillin/tazobactam IVPB.. 3.375 Gram(s) IV Intermittent every 8 hours  polyethylene glycol 3350 17 Gram(s) Oral daily  prednisoLONE acetate 1% Suspension 1 Drop(s) Left EYE four times a day  sodium chloride 2% Ophthalmic Solution 1 Drop(s) Left EYE four times a day PRN  valACYclovir 1000 milliGRAM(s) Oral three times a day  zolpidem 5 milliGRAM(s) Oral at bedtime PRN
69 yo F with a PMH recurrent GI bleeds, aortic dissection s/p multiple repairs, spinal hematoma resulting in paraplegia (on baclofen pump), CVA, recurrent UTIs, HTN, PAD, and HSV endophthalmitis/keratitis s/p left corneal transplant who presents with hypotension. Her  checked her BP at home yesterday and it was low, which was why he brought her to the ED. She had been discharged earlier that morning from [facility] after having been hospitalized for GI bleeding/melena. She had an endoscopy x2 and capsule endoscopy that showed no source of bleeding. She also received 1U PRBCs.  She denies having symptoms after discharge, and does not know how low her BP was. She denies fevers, chills, chest pain, cough, or SOB. She has had a BM every day, and is not sure if it is liquid or loose and is not sure if she is still having blood in her stool. She denies dysuria or hematuria. Since coming into the ED, she has endorsed an achy, intermittent left flank pain radiating frontward into her abdomen. She describes the pain severity as "severe," but is unable to further quantify or qualify it.    10/22: bloody BMs this morning, CT A AP, no EGD  10/23: no further bloody BMs; Hb low  no cp palps lightheadedness sob or numbness   10/24: stool is brown, no cp palps sob    PHYSICAL EXAM:  GENERAL: NAD, able to lie flat in bed  HEAD:  Atraumatic, Normocephalic  EYES: EOMI, PERRLA, normal sclera  ENT: Moist mucous membranes  NECK: Supple, No JVD, no nuchal rigidity  CHEST/LUNG: Clear to auscultation bilaterally; No rales, rhonchi, wheezing, or rubs. Unlabored respirations  HEART: Regular rate and rhythm; No murmurs, rubs, or gallops  ABDOMEN: Bowel sounds present; Soft, Nontender, Nondistended. No hepatomegaly  EXTREMITIES:  no pitting bilaterally  NERVOUS SYSTEM:  Alert & Oriented X3, speech clear. Paraplegia  SKIN: No rashes or lesions    LABS: All Labs Reviewed:                        8.2    x     )-----------( x        ( 24 Oct 2019 14:09 )             26.1     10-24    141  |  106  |  27<H>  ----------------------------<  101<H>  3.8   |  30  |  0.42<L>    RADIOLOGY/EKG:    < from: CT Angio Abdomen and Pelvis w/ IV Cont (10.22.19 @ 10:59) >  LOWER CHEST: Small bilateral pleural effusions. Postoperative changes at   the left lung base. Small hiatal hernia. Low-attenuation the blood pool,   consistent with anemia.    KIDNEYS/URETERS: Left renal atrophy. Mild left hydronephrosis. Left   ureteral stent extends from the left renal pelvis into the bladder. A   calculus is present along the course of the stent measuring 1.1 cm.    BOWEL: No bowel obstruction.  Metallic clips   are present along the greater curvature of the stomach. There is no   evidence of active GI bleeding.  PERITONEUM: No ascites.  VESSELS: The main portal vein is at the upper limits normal in size   measuring 1.6 cm. No portal venous thrombosis.    Aortic dissection is present below the level of SMA extending into the   bilateral common and external iliac arteries. Caliber of the aorta is   stable. Findings are grossly unchanged from prior exam.    Infrarenal IVC filter.    acetaminophen   Tablet .. 650 milliGRAM(s) Oral every 6 hours PRN  acetaminophen   Tablet .. 650 milliGRAM(s) Oral once  artificial  tears Solution 1 Drop(s) Both EYES every 4 hours  ascorbic acid 500 milliGRAM(s) Oral daily  atorvastatin 40 milliGRAM(s) Oral at bedtime  baclofen 20 milliGRAM(s) Oral two times a day  diphenhydrAMINE   Injectable 25 milliGRAM(s) IV Push once  docusate sodium 100 milliGRAM(s) Oral three times a day  DULoxetine 30 milliGRAM(s) Oral two times a day  furosemide   Injectable 20 milliGRAM(s) IV Push once  gabapentin 800 milliGRAM(s) Oral three times a day  labetalol 200 milliGRAM(s) Oral two times a day  lidocaine   Patch 1 Patch Transdermal daily  multivitamin 1 Tablet(s) Oral daily  ofloxacin 0.3% Solution 1 Drop(s) Left EYE four times a day  oxyCODONE    IR 10 milliGRAM(s) Oral three times a day PRN  pantoprazole  Injectable 40 milliGRAM(s) IV Push two times a day  polyethylene glycol 3350 17 Gram(s) Oral daily  prednisoLONE acetate 1% Suspension 1 Drop(s) Left EYE four times a day  sodium chloride 2% Ophthalmic Solution 1 Drop(s) Left EYE four times a day PRN  valACYclovir 1000 milliGRAM(s) Oral three times a day
69 yo F with a PMH recurrent GI bleeds, aortic dissection s/p multiple repairs, spinal hematoma resulting in paraplegia (on baclofen pump), CVA, recurrent UTIs, HTN, PAD, and HSV endophthalmitis/keratitis s/p left corneal transplant who presents with hypotension. Her  checked her BP at home yesterday and it was low, which was why he brought her to the ED. She had been discharged earlier that morning from [facility] after having been hospitalized for GI bleeding/melena. She had an endoscopy x2 and capsule endoscopy that showed no source of bleeding. She also received 1U PRBCs.  She denies having symptoms after discharge, and does not know how low her BP was. She denies fevers, chills, chest pain, cough, or SOB. She has had a BM every day, and is not sure if it is liquid or loose and is not sure if she is still having blood in her stool. She denies dysuria or hematuria. Since coming into the ED, she has endorsed an achy, intermittent left flank pain radiating frontward into her abdomen. She describes the pain severity as "severe," but is unable to further quantify or qualify it.    sub: recurrent UGIB. H/H stable. EGD did not localize source of bleed; abd somewhat distended, ordered mineral enema for today  no cp palps sob    PHYSICAL EXAM:  GENERAL: NAD, able to lie flat in bed  HEAD:  Atraumatic, Normocephalic  EYES: EOMI, PERRLA, normal sclera  ENT: Moist mucous membranes  NECK: Supple, No JVD, no nuchal rigidity  CHEST/LUNG: Clear to auscultation bilaterally; No rales, rhonchi, wheezing, or rubs. Unlabored respirations  HEART: Regular rate and rhythm; No murmurs, rubs, or gallops  ABDOMEN: Bowel sounds present; Soft, Nontender, Nondistended. No hepatomegaly  EXTREMITIES:  no pitting bilaterally  NERVOUS SYSTEM:  Alert & Oriented X3, speech clear. Paraplegia  SKIN: No rashes or lesions    LABS: All Labs Reviewed:                        9.5    x     )-----------( x        ( 21 Oct 2019 09:14 )             29.8     10-21    139  |  109<H>  |  9   ----------------------------<  90  3.7   |  26  |  0.50    Ca    8.5      21 Oct 2019 09:14  RADIOLOGY/EKG:    < from: Xray Kidney Ureter Bladder (10.20.19 @ 13:43) >  IMPRESSION: There is no visible stool in the colon or rectum,    representing a marked improvement compared to prior examination. Note is   made of a left ureteral stent, left abdominal wall spinal stimulator and   additional electronic device overlying the right lower abdominal wall.    < end of copied text >    acetaminophen   Tablet .. 650 milliGRAM(s) Oral every 6 hours PRN  artificial  tears Solution 1 Drop(s) Both EYES every 4 hours  ascorbic acid 500 milliGRAM(s) Oral daily  atorvastatin 40 milliGRAM(s) Oral at bedtime  baclofen 20 milliGRAM(s) Oral two times a day  docusate sodium 100 milliGRAM(s) Oral three times a day  DULoxetine 30 milliGRAM(s) Oral two times a day  gabapentin 800 milliGRAM(s) Oral three times a day  labetalol 200 milliGRAM(s) Oral two times a day  lidocaine   Patch 1 Patch Transdermal daily  multivitamin 1 Tablet(s) Oral daily  ofloxacin 0.3% Solution 1 Drop(s) Left EYE four times a day  oxyCODONE    IR 10 milliGRAM(s) Oral three times a day PRN  pantoprazole  Injectable 40 milliGRAM(s) IV Push two times a day  piperacillin/tazobactam IVPB.. 3.375 Gram(s) IV Intermittent every 8 hours  polyethylene glycol 3350 17 Gram(s) Oral daily  prednisoLONE acetate 1% Suspension 1 Drop(s) Left EYE four times a day  sodium chloride 2% Ophthalmic Solution 1 Drop(s) Left EYE four times a day PRN  valACYclovir 1000 milliGRAM(s) Oral three times a day  zolpidem 5 milliGRAM(s) Oral at bedtime PRN
Patient is a 68y old  Female who presents with a chief complaint of anemia, gib (22 Oct 2019 08:00)    HPI:  67 yo F with a PMH recurrent GI bleeds, aortic dissection s/p multiple repairs, spinal hematoma resulting in paraplegia (on baclofen pump), CVA, recurrent UTIs, HTN, PAD, and HSV endophthalmitis/keratitis s/p left corneal transplant who presents with hypotension. Her  checked her BP at home yesterday and it was low, which was why he brought her to the ED. She had been discharged earlier that morning from [facility] after having been hospitalized for GI bleeding/melena. She had an endoscopy x2 and capsule endoscopy that showed no source of bleeding. She also received 1U PRBCs.  She denies having symptoms after discharge, and does not know how low her BP was. She denies fevers, chills, chest pain, cough, or SOB. She has had a BM every day, and is not sure if it is liquid or loose and is not sure if she is still having blood in her stool. She denies dysuria or hematuria. Since coming into the ED, she has endorsed an achy, intermittent left flank pain radiating frontward into her abdomen. She describes the pain severity as "severe," but is unable to further quantify or qualify it.    In the ED, she was given Acetaminophen 650 mg PO x1, Zosyn IV x1, and 2U PRBCs. (17 Oct 2019 06:12)    10/23/2019-  Pt feels fine and wants to go home.   No overt gi bleeding.   Requesting solids.   CTA negative.     PAST MEDICAL & SURGICAL HISTORY:  Melena: 10/7/2019  Gastrointestinal hemorrhage: 9/28/2019, 9/4/2019, 8/29/2019  Dieulafoy lesion of stomach or duodenum: 10/1/2019  Subdural hematoma, nontraumatic: spontaneous  Dorsalgia of lumbar region: on pain medication /baclofen po and pump  Self-catheterizes urinary bladder  Anemia: chronic  Uveitis  Osteoporosis  PAD (peripheral artery disease)  Hematoma: spinal treated September 2018  Paraplegia: due to spinal hematoma, on wheelchair goes to physical therapy 2 x weekly  Aortic dissection, thoracic: Type A Repaired 2009  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 2018  UTI (urinary tract infection): recurrent  TIA (transient ischemic attack)  HTN (Hypertension)  History of corneal transplant: left corneal transplant on 5/21/2018  Disorder of spine: unthetethering 2 x  Presence of IVC filter: 2014 ?  S/P aortic dissection repair: Type A Dissection repair /2009   descending aortic aneurysm repair 9/2016  H/O Spinal surgery: laminectomies 2014    MEDICATIONS  (STANDING):  artificial  tears Solution 1 Drop(s) Both EYES every 4 hours  ascorbic acid 500 milliGRAM(s) Oral daily  atorvastatin 40 milliGRAM(s) Oral at bedtime  baclofen 20 milliGRAM(s) Oral two times a day  docusate sodium 100 milliGRAM(s) Oral three times a day  DULoxetine 30 milliGRAM(s) Oral two times a day  gabapentin 800 milliGRAM(s) Oral three times a day  labetalol 200 milliGRAM(s) Oral two times a day  lidocaine   Patch 1 Patch Transdermal daily  multivitamin 1 Tablet(s) Oral daily  ofloxacin 0.3% Solution 1 Drop(s) Left EYE four times a day  pantoprazole  Injectable 40 milliGRAM(s) IV Push two times a day  piperacillin/tazobactam IVPB.. 3.375 Gram(s) IV Intermittent every 8 hours  polyethylene glycol 3350 17 Gram(s) Oral daily  prednisoLONE acetate 1% Suspension 1 Drop(s) Left EYE four times a day  valACYclovir 1000 milliGRAM(s) Oral three times a day    MEDICATIONS  (PRN):  acetaminophen   Tablet .. 650 milliGRAM(s) Oral every 6 hours PRN Mild Pain (1 - 3)  oxyCODONE    IR 10 milliGRAM(s) Oral three times a day PRN Moderate and Severe Pain  sodium chloride 2% Ophthalmic Solution 1 Drop(s) Left EYE four times a day PRN Eye pain  zolpidem 5 milliGRAM(s) Oral at bedtime PRN Insomnia    Allergies  No Known Allergies    FAMILY HISTORY:  No pertinent family history in first degree relatives    REVIEW OF SYSTEMS:  CONSTITUTIONAL: No weakness, fevers or chills  RESPIRATORY: No cough, wheezing, hemoptysis; No shortness of breath  CARDIOVASCULAR: No chest pain or palpitations  GASTROINTESTINAL: No abdominal or epigastric pain. No nausea, vomiting, or hematemesis; No diarrhea or constipation. No melena or hematochezia.    Vital Signs Last 24 Hrs  T(C): 36.7 (23 Oct 2019 05:39), Max: 36.8 (22 Oct 2019 18:14)  T(F): 98 (23 Oct 2019 05:39), Max: 98.3 (22 Oct 2019 18:14)  HR: 77 (23 Oct 2019 05:39) (74 - 80)  BP: 126/65 (23 Oct 2019 05:39) (126/65 - 154/76)  BP(mean): --  RR: 18 (22 Oct 2019 18:14) (18 - 18)  SpO2: 90% (23 Oct 2019 05:39) (90% - 97%)    PHYSICAL EXAM:  Constitutional: Middle aged female with multiple medical issues, in nad, paraplegic   Respiratory: CTAB  Cardiovascular: S1 and S2, RRR, no M/G/R  Gastrointestinal: BS+, soft, NT/ND    LABS:                        7.4    x     )-----------( x        ( 23 Oct 2019 07:20 )             24.0     10-23    144  |  108  |  10  ----------------------------<  91  4.0   |  29  |  0.42<L>    Ca    8.2<L>      23 Oct 2019 07:20  Mg     2.1     10-23            RADIOLOGY & ADDITIONAL STUDIES:
Patient is a 68y old  Female who presents with a chief complaint of gi bleed (24 Oct 2019 07:20)    HPI:  67 yo F with a PMH recurrent GI bleeds, aortic dissection s/p multiple repairs, spinal hematoma resulting in paraplegia (on baclofen pump), CVA, recurrent UTIs, HTN, PAD, and HSV endophthalmitis/keratitis s/p left corneal transplant who presents with hypotension. Her  checked her BP at home yesterday and it was low, which was why he brought her to the ED. She had been discharged earlier that morning from [facility] after having been hospitalized for GI bleeding/melena. She had an endoscopy x2 and capsule endoscopy that showed no source of bleeding. She also received 1U PRBCs.  She denies having symptoms after discharge, and does not know how low her BP was. She denies fevers, chills, chest pain, cough, or SOB. She has had a BM every day, and is not sure if it is liquid or loose and is not sure if she is still having blood in her stool. She denies dysuria or hematuria. Since coming into the ED, she has endorsed an achy, intermittent left flank pain radiating frontward into her abdomen. She describes the pain severity as "severe," but is unable to further quantify or qualify it.    In the ED, she was given Acetaminophen 650 mg PO x1, Zosyn IV x1, and 2U PRBCs. (17 Oct 2019 06:12)    10/25/2019-  Pt ate breakfast and feels as though she is going to have a bowel movement.   No complaints.   Wants to go home.     PAST MEDICAL & SURGICAL HISTORY:  Melena: 10/7/2019  Gastrointestinal hemorrhage: 9/28/2019, 9/4/2019, 8/29/2019  Dieulafoy lesion of stomach or duodenum: 10/1/2019  Subdural hematoma, nontraumatic: spontaneous  Dorsalgia of lumbar region: on pain medication /baclofen po and pump  Self-catheterizes urinary bladder  Anemia: chronic  Uveitis  Osteoporosis  PAD (peripheral artery disease)  Hematoma: spinal treated September 2018  Paraplegia: due to spinal hematoma, on wheelchair goes to physical therapy 2 x weekly  Aortic dissection, thoracic: Type A Repaired 2009  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 2018  UTI (urinary tract infection): recurrent  TIA (transient ischemic attack)  HTN (Hypertension)  History of corneal transplant: left corneal transplant on 5/21/2018  Disorder of spine: unthetethering 2 x  Presence of IVC filter: 2014 ?  S/P aortic dissection repair: Type A Dissection repair /2009   descending aortic aneurysm repair 9/2016  H/O Spinal surgery: laminectomies 2014    MEDICATIONS  (STANDING):  artificial  tears Solution 1 Drop(s) Both EYES every 4 hours  ascorbic acid 500 milliGRAM(s) Oral daily  atorvastatin 40 milliGRAM(s) Oral at bedtime  baclofen 20 milliGRAM(s) Oral two times a day  docusate sodium 100 milliGRAM(s) Oral three times a day  DULoxetine 30 milliGRAM(s) Oral two times a day  gabapentin 800 milliGRAM(s) Oral three times a day  labetalol 200 milliGRAM(s) Oral two times a day  lidocaine   Patch 1 Patch Transdermal daily  multivitamin 1 Tablet(s) Oral daily  ofloxacin 0.3% Solution 1 Drop(s) Left EYE four times a day  pantoprazole  Injectable 40 milliGRAM(s) IV Push two times a day  polyethylene glycol 3350 17 Gram(s) Oral daily  prednisoLONE acetate 1% Suspension 1 Drop(s) Left EYE four times a day  valACYclovir 1000 milliGRAM(s) Oral three times a day    MEDICATIONS  (PRN):  acetaminophen   Tablet .. 650 milliGRAM(s) Oral every 6 hours PRN Mild Pain (1 - 3)  oxyCODONE    IR 10 milliGRAM(s) Oral three times a day PRN Moderate Pain (4 - 6)  sodium chloride 2% Ophthalmic Solution 1 Drop(s) Left EYE four times a day PRN Eye pain    Allegies  No Known Allergies    FAMILY HISTORY:  No pertinent family history in first degree relatives    REVIEW OF SYSTEMS:  CONSTITUTIONAL: No weakness, fevers or chills  RESPIRATORY: No cough, wheezing, hemoptysis; No shortness of breath  CARDIOVASCULAR: No chest pain or palpitations  GASTROINTESTINAL: No abdominal or epigastric pain. No nausea, vomiting, or hematemesis; No diarrhea or constipation. No melena or hematochezia.    Vital Signs Last 24 Hrs  T(C): 36.4 (25 Oct 2019 09:13), Max: 37.5 (25 Oct 2019 05:39)  T(F): 97.6 (25 Oct 2019 09:13), Max: 99.5 (25 Oct 2019 05:39)  HR: 68 (25 Oct 2019 09:13) (68 - 82)  BP: 110/52 (25 Oct 2019 09:13) (102/57 - 135/51)  BP(mean): --  RR: 16 (25 Oct 2019 09:13) (16 - 18)  SpO2: 96% (25 Oct 2019 09:13) (96% - 98%)    PHYSICAL EXAM:  Constitutional: Middle aged female in NAD, functional paraplegic  Respiratory: CTAB  Cardiovascular: S1 and S2, RRR, no M/G/R  Gastrointestinal: BS+, soft, NT/ND    LABS:                        9.0    6.87  )-----------( 343      ( 25 Oct 2019 07:11 )             28.6     10-25    141  |  105  |  25<H>  ----------------------------<  103<H>  3.8   |  31  |  0.45<L>    Ca    8.2<L>      25 Oct 2019 07:11  Mg     2.1     10-25            RADIOLOGY & ADDITIONAL STUDIES:
Patient is a 68y old  Female who presents with a chief complaint of gi bleeding (23 Oct 2019 10:36)      HPI:  pt feeling well today  no complaints    PAST MEDICAL & SURGICAL HISTORY:  Melena: 10/7/2019  Gastrointestinal hemorrhage: 9/28/2019, 9/4/2019, 8/29/2019  Dieulafoy lesion of stomach or duodenum: 10/1/2019  Subdural hematoma, nontraumatic: spontaneous  Dorsalgia of lumbar region: on pain medication /baclofen po and pump  Self-catheterizes urinary bladder  Anemia: chronic  Uveitis  Osteoporosis  PAD (peripheral artery disease)  Hematoma: spinal treated September 2018  Paraplegia: due to spinal hematoma, on wheelchair goes to physical therapy 2 x weekly  Aortic dissection, thoracic: Type A Repaired 2009  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 2018  UTI (urinary tract infection): recurrent  TIA (transient ischemic attack)  HTN (Hypertension)  History of corneal transplant: left corneal transplant on 5/21/2018  Disorder of spine: unthetethering 2 x  Presence of IVC filter: 2014 ?  S/P aortic dissection repair: Type A Dissection repair /2009   descending aortic aneurysm repair 9/2016  H/O Spinal surgery: laminectomies 2014    MEDICATIONS  (STANDING):  artificial  tears Solution 1 Drop(s) Both EYES every 4 hours  ascorbic acid 500 milliGRAM(s) Oral daily  atorvastatin 40 milliGRAM(s) Oral at bedtime  baclofen 20 milliGRAM(s) Oral two times a day  docusate sodium 100 milliGRAM(s) Oral three times a day  DULoxetine 30 milliGRAM(s) Oral two times a day  gabapentin 800 milliGRAM(s) Oral three times a day  labetalol 200 milliGRAM(s) Oral two times a day  lidocaine   Patch 1 Patch Transdermal daily  multivitamin 1 Tablet(s) Oral daily  ofloxacin 0.3% Solution 1 Drop(s) Left EYE four times a day  pantoprazole  Injectable 40 milliGRAM(s) IV Push two times a day  piperacillin/tazobactam IVPB.. 3.375 Gram(s) IV Intermittent every 8 hours  polyethylene glycol 3350 17 Gram(s) Oral daily  prednisoLONE acetate 1% Suspension 1 Drop(s) Left EYE four times a day  valACYclovir 1000 milliGRAM(s) Oral three times a day    MEDICATIONS  (PRN):  acetaminophen   Tablet .. 650 milliGRAM(s) Oral every 6 hours PRN Mild Pain (1 - 3)  oxyCODONE    IR 10 milliGRAM(s) Oral three times a day PRN Moderate and Severe Pain  sodium chloride 2% Ophthalmic Solution 1 Drop(s) Left EYE four times a day PRN Eye pain    Allergies  No Known Allergies    REVIEW OF SYSTEMS:    CONSTITUTIONAL: No weakness, fevers or chills  RESPIRATORY: No cough, wheezing, hemoptysis; No shortness of breath  CARDIOVASCULAR: No chest pain or palpitations  GASTROINTESTINAL: No abdominal or epigastric pain. No nausea, vomiting, or hematemesis; No diarrhea or constipation. No melena or hematochezia.  All other review of systems is negative unless indicated above.    Vital Signs Last 24 Hrs  T(C): 37.2 (24 Oct 2019 05:25), Max: 37.7 (23 Oct 2019 17:00)  T(F): 99 (24 Oct 2019 05:25), Max: 99.9 (23 Oct 2019 17:00)  HR: 83 (24 Oct 2019 05:25) (80 - 90)  BP: 128/55 (24 Oct 2019 05:25) (93/57 - 138/73)  BP(mean): --  RR: 18 (23 Oct 2019 21:24) (17 - 20)  SpO2: 96% (24 Oct 2019 05:25) (96% - 99%)    PHYSICAL EXAM:  Constitutional: NAD  Respiratory: CTAB  Cardiovascular: S1 and S2  Gastrointestinal: BS+, soft, NT/ND    LABS:                        7.4    x     )-----------( x        ( 23 Oct 2019 07:20 )             24.0     10-23    144  |  108  |  10  ----------------------------<  91  4.0   |  29  |  0.42<L>    Ca    8.2<L>      23 Oct 2019 07:20  Mg     2.1     10-23            RADIOLOGY & ADDITIONAL STUDIES:
Patient is a 68y old  Female who presents with a chief complaint of low bp (18 Oct 2019 12:41)      Subective:  Stool is dark brown  No vomiting.  C/O fecal incontinence    PAST MEDICAL & SURGICAL HISTORY:  Melena: 10/7/2019  Gastrointestinal hemorrhage: 9/28/2019, 9/4/2019, 8/29/2019  Dieulafoy lesion of stomach or duodenum: 10/1/2019  Subdural hematoma, nontraumatic: spontaneous  Dorsalgia of lumbar region: on pain medication /baclofen po and pump  Self-catheterizes urinary bladder  Anemia: chronic  Uveitis  Osteoporosis  PAD (peripheral artery disease)  Hematoma: spinal treated September 2018  Paraplegia: due to spinal hematoma, on wheelchair goes to physical therapy 2 x weekly  Aortic dissection, thoracic: Type A Repaired 2009  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 2018  UTI (urinary tract infection): recurrent  TIA (transient ischemic attack)  HTN (Hypertension)  History of corneal transplant: left corneal transplant on 5/21/2018  Disorder of spine: unthetethering 2 x  Presence of IVC filter: 2014 ?  S/P aortic dissection repair: Type A Dissection repair /2009   descending aortic aneurysm repair 9/2016  H/O Spinal surgery: laminectomies 2014      MEDICATIONS  (STANDING):  artificial  tears Solution 1 Drop(s) Both EYES every 4 hours  ascorbic acid 500 milliGRAM(s) Oral daily  atorvastatin 40 milliGRAM(s) Oral at bedtime  baclofen 20 milliGRAM(s) Oral two times a day  docusate sodium 100 milliGRAM(s) Oral three times a day  DULoxetine 30 milliGRAM(s) Oral two times a day  gabapentin 800 milliGRAM(s) Oral three times a day  labetalol 200 milliGRAM(s) Oral two times a day  lidocaine   Patch 1 Patch Transdermal daily  multivitamin 1 Tablet(s) Oral daily  ofloxacin 0.3% Solution 1 Drop(s) Left EYE four times a day  pantoprazole  Injectable 40 milliGRAM(s) IV Push two times a day  piperacillin/tazobactam IVPB.. 3.375 Gram(s) IV Intermittent every 8 hours  polyethylene glycol 3350 17 Gram(s) Oral daily  prednisoLONE acetate 1% Suspension 1 Drop(s) Left EYE four times a day  valACYclovir 1000 milliGRAM(s) Oral three times a day    MEDICATIONS  (PRN):  acetaminophen   Tablet .. 650 milliGRAM(s) Oral every 6 hours PRN Mild Pain (1 - 3)  oxyCODONE    IR 10 milliGRAM(s) Oral three times a day PRN Moderate and Severe Pain  sodium chloride 2% Ophthalmic Solution 1 Drop(s) Left EYE four times a day PRN Eye pain  zolpidem 5 milliGRAM(s) Oral at bedtime PRN Insomnia      REVIEW OF SYSTEMS:    RESPIRATORY: No shortness of breath  CARDIOVASCULAR: No chest pain  All other review of systems is negative unless indicated above.    Vital Signs Last 24 Hrs  T(C): 36.1 (19 Oct 2019 09:25), Max: 36.8 (19 Oct 2019 05:00)  T(F): 97 (19 Oct 2019 09:25), Max: 98.2 (19 Oct 2019 05:00)  HR: 75 (19 Oct 2019 06:00) (70 - 94)  BP: 131/60 (19 Oct 2019 06:00) (93/68 - 132/64)  BP(mean): 78 (19 Oct 2019 06:00) (62 - 81)  RR: 15 (19 Oct 2019 06:00) (10 - 25)  SpO2: 99% (19 Oct 2019 06:00) (90% - 100%)    PHYSICAL EXAM:    Constitutional: NAD, well-developed  Respiratory: CTAB  Cardiovascular: S1 and S2, RRR  Gastrointestinal: BS+, soft, NT/ND  Extremities: No peripheral edema  Psychiatric: Normal mood, normal affect    LABS:                        7.8    7.09  )-----------( 184      ( 19 Oct 2019 06:54 )             23.9     10-18    143  |  110<H>  |  24<H>  ----------------------------<  100<H>  3.5   |  23  |  0.47<L>    Ca    8.2<L>      18 Oct 2019 10:32      PT/INR - ( 18 Oct 2019 10:32 )   PT: 13.3 sec;   INR: 1.19 ratio         PTT - ( 18 Oct 2019 10:32 )  PTT:31.0 sec      RADIOLOGY & ADDITIONAL STUDIES:
Patient is a 68y old  Female who presents with a chief complaint of low bp (19 Oct 2019 10:07)      Subective:  No BMs. No bleeding    PAST MEDICAL & SURGICAL HISTORY:  Melena: 10/7/2019  Gastrointestinal hemorrhage: 9/28/2019, 9/4/2019, 8/29/2019  Dieulafoy lesion of stomach or duodenum: 10/1/2019  Subdural hematoma, nontraumatic: spontaneous  Dorsalgia of lumbar region: on pain medication /baclofen po and pump  Self-catheterizes urinary bladder  Anemia: chronic  Uveitis  Osteoporosis  PAD (peripheral artery disease)  Hematoma: spinal treated September 2018  Paraplegia: due to spinal hematoma, on wheelchair goes to physical therapy 2 x weekly  Aortic dissection, thoracic: Type A Repaired 2009  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 2018  UTI (urinary tract infection): recurrent  TIA (transient ischemic attack)  HTN (Hypertension)  History of corneal transplant: left corneal transplant on 5/21/2018  Disorder of spine: unthetethering 2 x  Presence of IVC filter: 2014 ?  S/P aortic dissection repair: Type A Dissection repair /2009   descending aortic aneurysm repair 9/2016  H/O Spinal surgery: laminectomies 2014      MEDICATIONS  (STANDING):  artificial  tears Solution 1 Drop(s) Both EYES every 4 hours  ascorbic acid 500 milliGRAM(s) Oral daily  atorvastatin 40 milliGRAM(s) Oral at bedtime  baclofen 20 milliGRAM(s) Oral two times a day  docusate sodium 100 milliGRAM(s) Oral three times a day  DULoxetine 30 milliGRAM(s) Oral two times a day  gabapentin 800 milliGRAM(s) Oral three times a day  labetalol 200 milliGRAM(s) Oral two times a day  lidocaine   Patch 1 Patch Transdermal daily  multivitamin 1 Tablet(s) Oral daily  ofloxacin 0.3% Solution 1 Drop(s) Left EYE four times a day  pantoprazole  Injectable 40 milliGRAM(s) IV Push two times a day  piperacillin/tazobactam IVPB.. 3.375 Gram(s) IV Intermittent every 8 hours  polyethylene glycol 3350 17 Gram(s) Oral daily  prednisoLONE acetate 1% Suspension 1 Drop(s) Left EYE four times a day  valACYclovir 1000 milliGRAM(s) Oral three times a day    MEDICATIONS  (PRN):  acetaminophen   Tablet .. 650 milliGRAM(s) Oral every 6 hours PRN Mild Pain (1 - 3)  oxyCODONE    IR 10 milliGRAM(s) Oral three times a day PRN Moderate and Severe Pain  sodium chloride 2% Ophthalmic Solution 1 Drop(s) Left EYE four times a day PRN Eye pain  zolpidem 5 milliGRAM(s) Oral at bedtime PRN Insomnia      REVIEW OF SYSTEMS:    RESPIRATORY: No shortness of breath  CARDIOVASCULAR: No chest pain  All other review of systems is negative unless indicated above.    Vital Signs Last 24 Hrs  T(C): 37.1 (20 Oct 2019 11:06), Max: 37.2 (19 Oct 2019 22:42)  T(F): 98.7 (20 Oct 2019 11:06), Max: 98.9 (19 Oct 2019 22:42)  HR: 87 (20 Oct 2019 11:06) (73 - 97)  BP: 111/52 (20 Oct 2019 11:06) (111/52 - 161/83)  BP(mean): 102 (19 Oct 2019 19:12) (78 - 102)  RR: 18 (20 Oct 2019 11:06) (13 - 18)  SpO2: 96% (20 Oct 2019 11:06) (87% - 98%)    PHYSICAL EXAM:    Constitutional: NAD, well-developed  Respiratory: CTAB  Cardiovascular: S1 and S2, RRR  Gastrointestinal: BS+, soft, NT/ND  Extremities: No peripheral edema  Psychiatric: Normal mood, normal affect    LABS:                        8.0    6.33  )-----------( 230      ( 20 Oct 2019 08:33 )             25.8                 RADIOLOGY & ADDITIONAL STUDIES:
Patient is a 68y old  Female who presents with a chief complaint of low bp (20 Oct 2019 14:02)    HPI:  67 yo F with a PMH recurrent GI bleeds, aortic dissection s/p multiple repairs, spinal hematoma resulting in paraplegia (on baclofen pump), CVA, recurrent UTIs, HTN, PAD, and HSV endophthalmitis/keratitis s/p left corneal transplant who presents with hypotension. Her  checked her BP at home yesterday and it was low, which was why he brought her to the ED. She had been discharged earlier that morning from [facility] after having been hospitalized for GI bleeding/melena. She had an endoscopy x2 and capsule endoscopy that showed no source of bleeding. She also received 1U PRBCs.  She denies having symptoms after discharge, and does not know how low her BP was. She denies fevers, chills, chest pain, cough, or SOB. She has had a BM every day, and is not sure if it is liquid or loose and is not sure if she is still having blood in her stool. She denies dysuria or hematuria. Since coming into the ED, she has endorsed an achy, intermittent left flank pain radiating frontward into her abdomen. She describes the pain severity as "severe," but is unable to further quantify or qualify it.    In the ED, she was given Acetaminophen 650 mg PO x1, Zosyn IV x1, and 2U PRBCs. (17 Oct 2019 06:12)    10/21/2019-  Pt reports lower back pain and constipation.   Wants to go home.   Awaiting breakfast now.     PAST MEDICAL & SURGICAL HISTORY:  Melena: 10/7/2019  Gastrointestinal hemorrhage: 9/28/2019, 9/4/2019, 8/29/2019  Dieulafoy lesion of stomach or duodenum: 10/1/2019  Subdural hematoma, nontraumatic: spontaneous  Dorsalgia of lumbar region: on pain medication /baclofen po and pump  Self-catheterizes urinary bladder  Anemia: chronic  Uveitis  Osteoporosis  PAD (peripheral artery disease)  Hematoma: spinal treated September 2018  Paraplegia: due to spinal hematoma, on wheelchair goes to physical therapy 2 x weekly  Aortic dissection, thoracic: Type A Repaired 2009  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 2018  UTI (urinary tract infection): recurrent  TIA (transient ischemic attack)  HTN (Hypertension)  History of corneal transplant: left corneal transplant on 5/21/2018  Disorder of spine: unthetethering 2 x  Presence of IVC filter: 2014 ?  S/P aortic dissection repair: Type A Dissection repair /2009   descending aortic aneurysm repair 9/2016  H/O Spinal surgery: laminectomies 2014    MEDICATIONS  (STANDING):  artificial  tears Solution 1 Drop(s) Both EYES every 4 hours  ascorbic acid 500 milliGRAM(s) Oral daily  atorvastatin 40 milliGRAM(s) Oral at bedtime  baclofen 20 milliGRAM(s) Oral two times a day  docusate sodium 100 milliGRAM(s) Oral three times a day  DULoxetine 30 milliGRAM(s) Oral two times a day  gabapentin 800 milliGRAM(s) Oral three times a day  labetalol 200 milliGRAM(s) Oral two times a day  lidocaine   Patch 1 Patch Transdermal daily  multivitamin 1 Tablet(s) Oral daily  ofloxacin 0.3% Solution 1 Drop(s) Left EYE four times a day  pantoprazole  Injectable 40 milliGRAM(s) IV Push two times a day  piperacillin/tazobactam IVPB.. 3.375 Gram(s) IV Intermittent every 8 hours  polyethylene glycol 3350 17 Gram(s) Oral daily  prednisoLONE acetate 1% Suspension 1 Drop(s) Left EYE four times a day  valACYclovir 1000 milliGRAM(s) Oral three times a day    MEDICATIONS  (PRN):  acetaminophen   Tablet .. 650 milliGRAM(s) Oral every 6 hours PRN Mild Pain (1 - 3)  oxyCODONE    IR 10 milliGRAM(s) Oral three times a day PRN Moderate and Severe Pain  sodium chloride 2% Ophthalmic Solution 1 Drop(s) Left EYE four times a day PRN Eye pain  zolpidem 5 milliGRAM(s) Oral at bedtime PRN Insomnia    Allergies  No Known Allergies    FAMILY HISTORY:  No pertinent family history in first degree relatives    REVIEW OF SYSTEMS:  CONSTITUTIONAL: No weakness, fevers or chills  RESPIRATORY: No cough, wheezing, hemoptysis; No shortness of breath  CARDIOVASCULAR: No chest pain or palpitations  GASTROINTESTINAL: Per HPI.     Vital Signs Last 24 Hrs  T(C): 36.6 (21 Oct 2019 05:00), Max: 37.1 (20 Oct 2019 11:06)  T(F): 97.8 (21 Oct 2019 05:00), Max: 98.7 (20 Oct 2019 11:06)  HR: 75 (21 Oct 2019 05:00) (75 - 97)  BP: 151/71 (21 Oct 2019 05:00) (111/52 - 151/71)  BP(mean): --  RR: 18 (20 Oct 2019 21:24) (16 - 18)  SpO2: 98% (21 Oct 2019 05:00) (96% - 99%)    PHYSICAL EXAM:  Constitutional: Elderly female with multiple medical issues, paraplegic   Respiratory: CTAB  Cardiovascular: S1 and S2, RRR, no M/G/R  Gastrointestinal: Softly distended, NT, +BS    LABS:                        8.0    6.33  )-----------( 230      ( 20 Oct 2019 08:33 )             25.8                 RADIOLOGY & ADDITIONAL STUDIES:
Patient is a 68y old  Female who presents with a chief complaint of anemia, hypotension (17 Oct 2019 10:36)      SUBJECTIVE:   HPI:  69 yo F with a PMH recurrent GI bleeds, aortic dissection s/p multiple repairs, spinal hematoma resulting in paraplegia (on baclofen pump), CVA, recurrent UTIs, HTN, PAD, and HSV endophthalmitis/keratitis s/p left corneal transplant who presents with hypotension. Her  checked her BP at home yesterday and it was low, which was why he brought her to the ED. She had been discharged earlier that morning from [facility] after having been hospitalized for GI bleeding/melena. She had an endoscopy x2 and capsule endoscopy that showed no source of bleeding. She also received 1U PRBCs.  She denies having symptoms after discharge, and does not know how low her BP was. She denies fevers, chills, chest pain, cough, or SOB. She has had a BM every day, and is not sure if it is liquid or loose and is not sure if she is still having blood in her stool. She denies dysuria or hematuria. Since coming into the ED, she has endorsed an achy, intermittent left flank pain radiating frontward into her abdomen. She describes the pain severity as "severe," but is unable to further quantify or qualify it.    In the ED, she was given Acetaminophen 650 mg PO x1, Zosyn IV x1, and 2U PRBCs. (17 Oct 2019 06:12)        REVIEW OF SYSTEMS:    CONSTITUTIONAL: No weakness, fevers or chills  EYES/ENT: No visual changes;  No vertigo or throat pain   NECK: No pain or stiffness  RESPIRATORY: No cough, wheezing, hemoptysis; No shortness of breath  CARDIOVASCULAR: No chest pain or palpitations  GASTROINTESTINAL: No abdominal or epigastric pain. No nausea, vomiting, or hematemesis; No diarrhea or constipation. No melena or hematochezia.  GENITOURINARY: No dysuria, frequency or hematuria  NEUROLOGICAL: No numbness or weakness  SKIN: No itching, burning, rashes, or lesions   All other review of systems is negative unless indicated above    Height (cm): 170.2 (10-17 @ 10:20)  Weight (kg): 58.2 (10-17 @ 10:20)  BMI (kg/m2): 20.1 (10-17 @ 10:20)  BSA (m2): 1.67 (10-17 @ 10:20)    Vital Signs Last 24 Hrs  T(C): 37.8 (17 Oct 2019 10:20), Max: 37.8 (17 Oct 2019 02:29)  T(F): 100.1 (17 Oct 2019 10:20), Max: 100.1 (17 Oct 2019 02:29)  HR: 141 (17 Oct 2019 10:30) (93 - 141)  BP: 148/79 (17 Oct 2019 10:30) (117/70 - 153/90)  BP(mean): 95 (17 Oct 2019 10:30) (82 - 96)  RR: 29 (17 Oct 2019 10:30) (15 - 29)  SpO2: 96% (17 Oct 2019 10:30) (94% - 100%)    I&O's Summary      CAPILLARY BLOOD GLUCOSE          PHYSICAL EXAM:    Constitutional: NAD, awake and alert weak  HEENT: PERR, EOMI, Normal Hearing, MMM  Neck: Soft and supple, No LAD, No JVD  Respiratory: Breath sounds are clear bilaterally, No wheezing, rales or rhonchi  Cardiovascular: S1 and S2, regular rate and rhythm, no Murmurs, gallops or rubs  Gastrointestinal: Bowel Sounds present, soft, nontender, nondistended, no guarding, no rebound  Extremities: No peripheral edema  Vascular: 2+ peripheral pulses  Neurological: A/O x 3, no focal deficits  Musculoskeletal: functional quadriplegic  Skin: No rashes    MEDICATIONS:  MEDICATIONS  (STANDING):  artificial  tears Solution 1 Drop(s) Both EYES every 4 hours  ascorbic acid 500 milliGRAM(s) Oral daily  atorvastatin 40 milliGRAM(s) Oral at bedtime  baclofen 20 milliGRAM(s) Oral two times a day  docusate sodium 100 milliGRAM(s) Oral three times a day  DULoxetine 30 milliGRAM(s) Oral two times a day  gabapentin 800 milliGRAM(s) Oral three times a day  labetalol 200 milliGRAM(s) Oral two times a day  lidocaine   Patch 1 Patch Transdermal daily  multivitamin 1 Tablet(s) Oral daily  ofloxacin 0.3% Solution 1 Drop(s) Left EYE four times a day  pantoprazole  Injectable 40 milliGRAM(s) IV Push two times a day  piperacillin/tazobactam IVPB.. 3.375 Gram(s) IV Intermittent every 8 hours  polyethylene glycol 3350 17 Gram(s) Oral daily  potassium chloride    Tablet ER 40 milliEquivalent(s) Oral once  prednisoLONE acetate 1% Suspension 1 Drop(s) Left EYE four times a day  valACYclovir 1000 milliGRAM(s) Oral three times a day      LABS: All Labs Reviewed:                        9.0    16.02 )-----------( 222      ( 17 Oct 2019 08:41 )             26.4     10-17    136  |  104  |  37<H>  ----------------------------<  111<H>  3.4<L>   |  22  |  0.40<L>    Ca    7.6<L>      17 Oct 2019 08:41  Phos  3.7     10-17  Mg     1.7     10-17    TPro  4.7<L>  /  Alb  1.9<L>  /  TBili  0.3  /  DBili  x   /  AST  26  /  ALT  21  /  AlkPhos  71  10-16    PT/INR - ( 17 Oct 2019 02:15 )   PT: 12.7 sec;   INR: 1.14 ratio         PTT - ( 16 Oct 2019 19:11 )  PTT:27.8 sec          Blood Culture:     RADIOLOGY/EKG:    < from: CT Angio Chest PE Protocol w/ IV Cont (10.17.19 @ 08:00) >    IMPRESSION:     No pulmonary embolism.    Status post ascending aortic repair with stable residual dissection at   the arch and extending into the right brachiocephalic and common carotid   arteries.    Unchanged penetrating ulcer versus pseudoaneurysm in the mid descending   aorta.    Unchanged bilateral small airways disease.    < end of copied text >  < from: CT Angio Abdomen and Pelvis w/ IV Cont (10.16.19 @ 19:50) >    IMPRESSION:     No active bleeding.    Large fecal retention throughout the colon and rectum with evidence of   stercoral proctitis.    Fluid and debris distention of the stomach. Fluid distention of the lower   esophagus. Aspiration precautions are advised.    < end of copied text >

## 2019-10-25 NOTE — DISCHARGE NOTE PROVIDER - PROVIDER TOKENS
PROVIDER:[TOKEN:[[id number]]],PROVIDER:[TOKEN:[[id number]]],PROVIDER:[TOKEN:[[id number]]]

## 2019-10-25 NOTE — PROGRESS NOTE ADULT - ASSESSMENT
69 y/o female with recurrent gi bleeding s/p egd with Dr. Yoo noting gastritis.  No evidence of active gi bleeding noted overnight   S/p x1 unit of PRBCs yesterday, h/h stable this AM.   Ok from a gi standpoint to d/c home today.   Discussed with pt, Dr. Conway.

## 2019-10-25 NOTE — DISCHARGE NOTE PROVIDER - CARE PROVIDER_API CALL
Basil Branham)  Cardiovascular Disease; Internal Medicine  [address], [address]  [place] [address]  Phone: [phone]  Fax: [phone]  Follow Up Time:     Rodri Yoo)  Gastroenterology; Internal Medicine  [address] [place] [address]  Phone: [phone]  Fax: [phone]  Follow Up Time:     Jorge Lares)  Urology  [address], [address] [place] [address]  Phone: [phone]  Fax: [phone]  Follow Up Time:

## 2019-10-25 NOTE — CONSULT NOTE ADULT - SUBJECTIVE AND OBJECTIVE BOX
UROLOGY CONSULTATION    BRENT GRICEL  [id number]    CC    HPI  Patient is a 68y yo Female who is admitted for Gastrointestinal hemorrhage    Urology consult requested for retained left ureteral stent and ureterolithiasis  Patient underwent emergent cysto L stent placement by my former partner Dr Lares 8/12/19 for a 1cm mid ureteral left stone.     Multiple hospitalizations for recurrent GI bleeding s/p multiple endoscopic interventions.    Patient seen and examined at bedside.     Denies dysuria, flank pain, gross hematuria, fevers, chills, nausea, vomiting or weight loss    ROS  12 point ROS negative except that outlined in HPI    PMHX  Gastrointestinal hemorrhage  No pertinent family history in first degree relatives  Family history of Alzheimer's disease  No pertinent family history in first degree relatives  Melena  Gastrointestinal hemorrhage  Dieulafoy lesion of stomach or duodenum  Subdural hematoma, nontraumatic  Dorsalgia of lumbar region  Self-catheterizes urinary bladder  Anemia  Uveitis  Osteoporosis  PAD (peripheral artery disease)  Hematoma  Paraplegia  Aortic dissection, abdominal  Aortic dissection, thoracic  Blindness of left eye  Chronic constipation  Subdural hematoma  UTI (urinary tract infection)  TIA (transient ischemic attack)  Aortic Dissection, Abdominal  HTN (Hypertension)  History of corneal transplant  Disorder of spine  Presence of IVC filter  S/P aortic dissection repair  H/O Spinal surgery  Aneurysm Repair, Abdominal Aortic        MEDS  acetaminophen   Tablet .. 650 milliGRAM(s) Oral every 6 hours PRN  artificial  tears Solution 1 Drop(s) Both EYES every 4 hours  ascorbic acid 500 milliGRAM(s) Oral daily  atorvastatin 40 milliGRAM(s) Oral at bedtime  baclofen 20 milliGRAM(s) Oral two times a day  docusate sodium 100 milliGRAM(s) Oral three times a day  DULoxetine 30 milliGRAM(s) Oral two times a day  gabapentin 800 milliGRAM(s) Oral three times a day  labetalol 200 milliGRAM(s) Oral two times a day  lidocaine   Patch 1 Patch Transdermal daily  multivitamin 1 Tablet(s) Oral daily  ofloxacin 0.3% Solution 1 Drop(s) Left EYE four times a day  oxyCODONE    IR 10 milliGRAM(s) Oral three times a day PRN  pantoprazole  Injectable 40 milliGRAM(s) IV Push two times a day  polyethylene glycol 3350 17 Gram(s) Oral daily  prednisoLONE acetate 1% Suspension 1 Drop(s) Left EYE four times a day  sodium chloride 2% Ophthalmic Solution 1 Drop(s) Left EYE four times a day PRN  valACYclovir 1000 milliGRAM(s) Oral three times a day      Allergies  No Known Allergies        VITALS  T(C): 37.5 (10-25-19 @ 05:39), Max: 37.5 (10-25-19 @ 05:39)  HR: 74 (10-25-19 @ 05:39)  BP: 119/47 (10-25-19 @ 05:39)  RR: 18 (10-25-19 @ 05:39)  SpO2: 96% (10-25-19 @ 05:39)    10-24-19 @ 07:01  -  10-25-19 @ 07:00  --------------------------------------------------------  IN: 0 mL / OUT: 850 mL / NET: -850 mL        Gen: elderly thin Female in NAD  HEENT: normocephalic, anicteric sclera, moist mucus membranes; hearing intact  Chest: non labored breathing  Abd: soft, NT, ND, no masses  : no suprapubic distension or tenderness  Psych: AAOx3, normal affect & mood  Ext: contracted extremities no peripheral edema    LABS  10-25    141  |  105  |  25<H>  ----------------------------<  103<H>  3.8   |  31  |  0.45<L>    Ca    8.2<L>      25 Oct 2019 07:11  Mg     2.1     10-25      CBC Full  -  ( 25 Oct 2019 07:11 )  WBC Count : 6.87 K/uL  Hemoglobin : 9.0 g/dL  Hematocrit : 28.6 %  Platelet Count - Automated : 343 K/uL  Mean Cell Volume : 92.9 fl  Mean Cell Hemoglobin : 29.2 pg  Mean Cell Hemoglobin Concentration : 31.5 gm/dL  Auto Neutrophil # : x  Auto Lymphocyte # : x  Auto Monocyte # : x  Auto Eosinophil # : x  Auto Basophil # : x  Auto Neutrophil % : x  Auto Lymphocyte % : x  Auto Monocyte % : x  Auto Eosinophil % : x  Auto Basophil % : x      UA:      Culture - Urine (collected 23 Oct 2019 16:30)  Source: .Urine Catheterized  Final Report (25 Oct 2019 06:45):    >=3 organisms. Probable collection contamination.        RADIOLOGY  10/22/19 CT A/p: 1cm left mid ureteral stone, retained ureteral stent, mild hydro  10/20/19 KUB: stone not well visualized  9/28/19 KUB: 1cm stone well visualized mid ureter     ASSESSMENT & PLAN    Retained stent  Ureterolithiasis  - I discussed with the patient the treatment options for her obstructing ureteral calculus:   1. Stent removal with attempt at spontaneous passage. During this time period they may experience pain, infection and hematuria. The patient understands that they must follow up in the office and undergo repeat imaging to ensure the stone has passed. Lack of pain does not mean the stone has passed definitively. Do not recommend this due to large stone size  2. ESWL which could be done if the stone is visible on xray and is done as an outpatient usually.  3. Ureteroscopy with laser lithotripsy    Pros, cons, stone clearance rates and risks of each were reviewed in detail   At the end of our discussion patient has agreed to undergo ESWL      The patient understands that the stent is not permanent and needs to be removed within a few months at the latest. THis is done as an outpatient. I strongly encouraged her to follow up as soon as she leaves the hospital since her treatment has been already delayed by several months    Please provide medical clearance for general anesthesia  Please obtain straight cath urine specimen for culture in preparation for surgical intervention     All questions answered to satisfaction           Thank you for allowing me to participate in the care of this patient  Please call if there are questions or concerns    > 25 minutes spent in face to face time with patient. I explained the various issues and complexities regarding their current urological condition(s) and treatment options. They verbalized understanding and I answered all of their questions to satisfaction.     Flory Dixon MD  [facility]  [phone]    10-25-19 @ 08:36

## 2019-10-25 NOTE — CDI QUERY NOTE - NSCDIOTHERTXTBX_GEN_ALL_CORE_HH
Admitted on 10/17 This patient is documented with ANGELIKA    Creatinine Trend:  Creatinine, Serum: 0.42 mg/dL <L> [0.50 - 1.30] (10-23-19)  Creatinine, Serum: 0.33 mg/dL <L> [0.50 - 1.30] (10-22-19)  Creatinine, Serum: 0.50 mg/dL [0.50 - 1.30] (10-21-19)  Creatinine, Serum: 0.47 mg/dL <L> [0.50 - 1.30] (10-18-19)  Creatinine, Serum: 0.40 mg/dL <L> [0.50 - 1.30] (10-17-19)    [facility] policy based on KDIGO guidelines defines ANGELIKA (applicable to both adult and pediatric patients) as any of the following;  •	Increase Creatinine = 0.3 mg/dl from baseline within 48 hours; or  •	Increase in Creatinine level to = 1.5x baseline, which is known or presumed to have occurred within the prior 7 days; or    According to clinical guidelines, clinical criteria must support documented clinical diagnoses.    Can you please clarify the clinical indicators supporting the diagnosis of ANGELIKA or clarify that ANGELIKA has been ruled out?  a) ANGELIKA ruled-out , does not meet KDIGO criteria  b)) ANGELIKA ruled-in, please provide baseline creatinine  c) other, please clarify

## 2019-10-25 NOTE — DISCHARGE NOTE PROVIDER - HOSPITAL COURSE
69 yo F with a PMH recurrent GI bleeds, aortic dissection s/p multiple repairs, spinal hematoma resulting in paraplegia (on baclofen pump), CVA, recurrent UTIs, HTN, PAD, and HSV endophthalmitis/keratitis s/p left corneal transplant who presents with hypotension. Her  checked her BP at home yesterday and it was low, which was why he brought her to the ED. She had been discharged earlier that morning from [facility] after having been hospitalized for GI bleeding/melena. She had an endoscopy x2 and capsule endoscopy that showed no source of bleeding. She also received 1U PRBCs. Pt came to the ER after she found to have hypotension due to suspected bleed.        Problem/Plan - 1:    ·  Problem: Acute on chronic blood loss anemia.  Plan: - Likely from blood loss due to GI bleed. Patient has a history of GI bleeds, last admission with EGD and capsule endoscopy unable to find source but was thought to have been due to Dieulafoy lesion    -Repeat EGD 10/18: no e/o bleed; source unidentifiable    - CTAP/Chest negative for acute bleed    - D/W Dr Yoo-> s/s strongly suggestive of recurrent bleed d/t Dielafoys    - Pt had a bloody BM and CTA A/P was done and it was negative for bleed    10/24 - Long discussion with patient and her  today - understandably worried she will go home and bleed again, discussed with them re current stable Hb etc, agreed that we would transfuse to goal HB 9 in her case and then dc home. Per  she has in the past been discharged with Hb 7.9 and returned with bloody BMs the same day.    10/25 - Hb 9, pt feels well,  and for discharge home today.         Problem/Plan - 2:    ·  Problem: Proctitis.  Plan: - Patient with large amount of stool with evidence of stercoral proctitis on CT    - Patient with marked leukocytosis, tachycardia, and low grade temperatures    - KUB today for poss fecal impaction - improving    Stop Zosyn        Problem/Plan - 3:    ·  Leukocytosis and tachycardia    - Likely due to stercoral proctitis complicated by blood loss anemia    - S/p 3U PRBCs    - Lactate normal    - Plan as noted above, will add IVFs after PRBCs    - UA shows bacteria but with epithelial cells, likely contaminant.         Problem/Plan - 4:    ·  Problem: Acute kidney injury.  Plan: - Likely prerenal from acute blood loss    - resolved.         Problem/Plan - 5:    ·  Problem: Keratitis, herpetic.  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         Problem/Plan - 6:    Problem: Paraplegia. Plan: - Due to past spinal hematoma complicated by neuropathic pain    - On Baclofen pump. Will also continue Baclofen 20 mg BID    - Will also continue Duloxetine 30 mg BID and Gabapentin 800 mg TID.        Problem/Plan - 7:    ·  Problem: HTN (Hypertension).  Plan: - C/w Labetalol with high hold parameters.         Problem/Plan - 8:    ·  Problem: Prophylactic measure.  Plan: - DVT PPX: IMPROVE score of 3, but due to suspected bleeding, hold off pharmacological PPX. SCDs only    - Diet: NPO for now    - Dispo: pending GI workup        Problem/Plan - 9:    ·  Problem: Malnutrition.         Dispo:Home         f/u Urology as OP - Jorge Lares MD for calculus along the course of the stent    Plan discussed with RN and patient's         Time spent on discharge - 60 mins 67 yo F with a PMH recurrent GI bleeds, aortic dissection s/p multiple repairs, spinal hematoma resulting in paraplegia (on baclofen pump), CVA, recurrent UTIs, HTN, PAD, and HSV endophthalmitis/keratitis s/p left corneal transplant who presents with hypotension. Her  checked her BP at home yesterday and it was low, which was why he brought her to the ED. She had been discharged earlier that morning from [facility] after having been hospitalized for GI bleeding/melena. She had an endoscopy x2 and capsule endoscopy that showed no source of bleeding. She also received 1U PRBCs. Pt came to the ER after she found to have hypotension due to suspected bleed.        Problem/Plan - 1:    ·  Problem: Acute on chronic blood loss anemia.  Plan: - Likely from blood loss due to GI bleed. Patient has a history of GI bleeds, last admission with EGD and capsule endoscopy unable to find source but was thought to have been due to Dieulafoy lesion    -Repeat EGD 10/18: no e/o bleed; source unidentifiable    - CTAP/Chest negative for acute bleed    - D/W Dr Yoo-> s/s strongly suggestive of recurrent bleed d/t Dielafoys    - Pt had a bloody BM and CTA A/P was done and it was negative for bleed    10/24 - Long discussion with patient and her  today - understandably worried she will go home and bleed again, discussed with them re current stable Hb etc, agreed that we would transfuse to goal HB 9 in her case and then dc home. Per  she has in the past been discharged with Hb 7.9 and returned with bloody BMs the same day.    10/25 - Hb 9, pt feels well,  and for discharge home today.         Problem/Plan - 2:    ·  Problem: Proctitis.  Plan: - Patient with large amount of stool with evidence of stercoral proctitis on CT    - Patient with marked leukocytosis, tachycardia, and low grade temperatures    - KUB today for poss fecal impaction - improving    Stop Zosyn        Problem/Plan - 3:    ·  Leukocytosis and tachycardia    - Likely due to stercoral proctitis complicated by blood loss anemia    - S/p 3U PRBCs    - Lactate normal    - Plan as noted above, will add IVFs after PRBCs    - UA shows bacteria but with epithelial cells, likely contaminant.         Problem/Plan - 4:    ·  Problem: Acute kidney injury.  Plan: - Likely prerenal from acute blood loss    - resolved.         Problem/Plan - 5:    ·  Problem: Keratitis, herpetic.  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         Problem/Plan - 6:    Problem: Paraplegia. Plan: - Due to past spinal hematoma complicated by neuropathic pain    - On Baclofen pump. Will also continue Baclofen 20 mg BID    - Will also continue Duloxetine 30 mg BID and Gabapentin 800 mg TID.        Problem/Plan - 7:    ·  Problem: HTN (Hypertension).  Plan: - C/w Labetalol with high hold parameters.         Problem/Plan - 8:    ·  Problem: Prophylactic measure.  Plan: - DVT PPX: IMPROVE score of 3, but due to suspected bleeding, hold off pharmacological PPX. SCDs only    - Diet: NPO for now    - Dispo: pending GI workup        Problem/Plan - 9:    ·  Problem: Malnutrition.         Dispo:Home         f/u Urology as OP - Jorge Lares MD for calculus along the course of the stent    Plan discussed with RN and patient's         Time spent on discharge - 60 mins            **CORRECTION  - PT DOES NOT MEET CRITERIA FOR ANGELIKA

## 2019-10-28 NOTE — ED PROVIDER NOTE - ATTENDING CONTRIBUTION TO CARE
I, Kaitlin Moser MD,  performed the initial face to face bedside interview with this patient regarding history of present illness, review of symptoms and relevant past medical, social and family history.  I completed an independent physical examination.  I was the initial provider who evaluated this patient. I have signed out the follow up of any pending tests (i.e. labs, radiological studies) to the ACP.  I have communicated the patient’s plan of care and disposition with the ACP.  The history, relevant review of systems, past medical and surgical history, medical decision making, and physical examination was documented by the scribe in my presence and I attest to the accuracy of the documentation.

## 2019-10-28 NOTE — ED ADULT NURSE NOTE - OBJECTIVE STATEMENT
pt BIBEMS from home,  bed bound paraplegic s/p spontaneous brain bleed. pt sent by PMD/ for low H&H from output blood work. pt recently d/c from hospital. pt reports chronic 10/10 lower back pain. pt denies all other symptoms at this time.

## 2019-10-28 NOTE — H&P ADULT - HISTORY OF PRESENT ILLNESS
Pt is a 69 yo Female with a PMH/o aortic dissection s/p multiple repairs, spinal hematoma resulting in paraplegia (on baclofen pump), CVA, nephrolithiasis with retained stent (did not f/u to remove as per pt s/p d/c home), recurrent UTIs, HTN, PAD, and HSV endophthalmitis/keratitis s/p left corneal transplant who presents with lethargy, weakness, malaise. 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 Today pt received call about abnormal labs with hgb of 4. Pt denies any bright red bleeding per rectum, vomiting, diarrhea, nausea, abdominal pain, but does endorse dark stool. Of note, Pt has been hospitalized multiple times with endoscopies and capsule endoscopies, and CTA's which have all been without source of bleeding. In past pt episodes complicated by hypotension and BRBPR while inpatient/on admission.

## 2019-10-28 NOTE — H&P ADULT - NSICDXPASTMEDICALHX_GEN_ALL_CORE_FT
PAST MEDICAL HISTORY:  Anemia chronic    Aortic dissection, thoracic Type A Repaired 2009    Blindness of left eye hx corneal transplant 2018  Aug. 2018    Dieulafoy lesion of stomach or duodenum 10/1/2019    Dorsalgia of lumbar region on pain medication /baclofen po and pump    Gastrointestinal hemorrhage 9/28/2019, 9/4/2019, 8/29/2019    Hematoma spinal treated September 2018    HTN (Hypertension)     Melena 10/7/2019    Osteoporosis     PAD (peripheral artery disease)     Paraplegia due to spinal hematoma, on wheelchair goes to physical therapy 2 x weekly    Self-catheterizes urinary bladder     Subdural hematoma, nontraumatic spontaneous    TIA (transient ischemic attack)     UTI (urinary tract infection) recurrent    Uveitis

## 2019-10-28 NOTE — ED ADULT TRIAGE NOTE - CHIEF COMPLAINT QUOTE
Pt. to the ED BIBA from home C/O Abnormal lab (Low H/H) of 4.0- Pt. reports dark stool and recent DC from Hospital- Pt. denies abdominal pain

## 2019-10-28 NOTE — ED PROVIDER NOTE - OBJECTIVE STATEMENT
69 y/o F presents with low hg. Pt has recurrent GI bleeds, is seeing Dr. Yoo. Pt was discharged on friday with hg of 9, today pt was pale and very lethargic, [facility] nursing came to draw blood work and had a hg of 5.5. Reports episode of brown-black stool today, no gross blood. Denies fever/chills, n/v/d, CP, SOB, abd pain or other complaints at this time. -Paulino Peoples PA-C

## 2019-10-28 NOTE — ED ADULT NURSE NOTE - PMH
Anemia  chronic  Aortic dissection, thoracic  Type A Repaired 2009  Blindness of left eye  hx corneal transplant 2018  Aug. 2018  Dieulafoy lesion of stomach or duodenum  10/1/2019  Dorsalgia of lumbar region  on pain medication /baclofen po and pump  Gastrointestinal hemorrhage  9/28/2019, 9/4/2019, 8/29/2019  Hematoma  spinal treated September 2018  HTN (Hypertension)    Melena  10/7/2019  Osteoporosis    PAD (peripheral artery disease)    Paraplegia  due to spinal hematoma, on wheelchair goes to physical therapy 2 x weekly  Self-catheterizes urinary bladder    Subdural hematoma, nontraumatic  spontaneous  TIA (transient ischemic attack)    UTI (urinary tract infection)  recurrent  Uveitis

## 2019-10-28 NOTE — ED PROVIDER NOTE - PROGRESS NOTE DETAILS
case discussed with Dr. Kauffman who will admit pt. -Paulino Peoples PA-C Bhavin RICK for attending Dr. Moser: 67 y/o female with a PMHx of GI bleeds presents to the ED with low hg. Pt has recurrent GI bleeds, is seeing Dr. Yoo. Pt was discharged on Friday with hg of 9, today pt was pale and very lethargic. [facility] came to draw blood work and had a hg of 5.5. Pt is unsure if she dark stools. Now complaining of lower back pain. Denies fever/chills, n/v/d, CP, SOB, abd pain or other complaints at this time. Bhavin RICK for attending Dr. Moser: 67 y/o female with a PMHx of GI bleeds presents to the ED with low hg. Pt has recurrent GI bleeds, is seeing Dr. Yoo. Pt was discharged on Friday with hg of 9, today pt was pale and very lethargic. [facility] came to draw blood work and had a hg of 5.5. Pt is unsure if she dark stools. Now complaining of her chronic lower back pain. Denies fever/chills, n/v/d, CP, SOB, abd pain or other complaints at this time.  abd soft, NT.  RRR.  CTABL.

## 2019-10-28 NOTE — ED PROVIDER NOTE - PHYSICAL EXAMINATION
Constitutional: NAD AAOx3  Eyes: Pale conjunctiva. PERRLA EOMI  Head: NCAT  Mouth: MM dry  Cardiac: s1s2. rrr  Lungs: CTA B/L. No accessory muscle use  Abd: soft, nd. NTTP. no r/g/r  Skin: pale complexion

## 2019-10-28 NOTE — ED ADULT NURSE NOTE - NSIMPLEMENTINTERV_GEN_ALL_ED
Implemented All Fall Risk Interventions:  [place] to call system. Call bell, personal items and telephone within reach. Instruct patient to call for assistance. Room bathroom lighting operational. Non-slip footwear when patient is off stretcher. Physically safe environment: no spills, clutter or unnecessary equipment. Stretcher in lowest position, wheels locked, appropriate side rails in place. Provide visual cue, wrist band, yellow gown, etc. Monitor gait and stability. Monitor for mental status changes and reorient to person, place, and time. Review medications for side effects contributing to fall risk. Reinforce activity limits and safety measures with patient and family.

## 2019-10-28 NOTE — H&P ADULT - ASSESSMENT
Pt is a 69 yo Female with a PMH/o aortic dissection s/p multiple repairs, spinal hematoma resulting in paraplegia (on baclofen pump), CVA, recurrent UTIs, HTN, PAD, and HSV endophthalmitis/keratitis s/p left corneal transplant who presents with lethargy, weakness, malaise. Pt has a h/o recurrent GI bleeds,thought to be due to a Dielafoys lesion, and was discharged a few days ago with a hgb 9. Today pt recieved call about abnormal labs with hgb of 4. Pt admitted for acute on chronic symptomatic anemia concerning for underlying Deiulafoy lesion.     Problem/Plan - 1:  ·  Problem: Acute on chronic, Symptomatic, blood loss anemia concerning for GI bleeding in setting of presumed Deiulafoy lesion as multiple w/u w/o source of bleeding.    -Repeat EGD 10/18: neg for bleeding, consider Colonoscopy?  - CTAP with IV contrast ordered, awaiting CMP for renal function to perform  - Outpatient labs: h/h 5/16.9 plt 348   - GI consult Dr Yoo  - Protonix bid  - NPO  - h/h q 6 hrs   - f/u cbc, mg, phos, coags, cmp in AM  - consent signed in ED  - VCD boots for DVt proph  - fall precautions  - transfuse for goal >8    Problem/Plan - 2:  ·  Problem: Recent stercoral proctitis on CT  - Pt CTA pending, hold abx as no leukocytosis or fever  - Pt with abdominal exam concerning for fecal retention, in light of concern for     Problem/Plan - 3: nephrolithiasis/retained stent:  - Urology consult requested on last admission for retained left ureteral stent and ureterolithiasis as pt had L stent placement by Dr Lares 8/12/19 for a 1cm mid ureteral left stone.  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     Problem/Plan - 6:Paraplegia.   Plan: - Due to past spinal hematoma complicated by neuropathic pain  - fall precautions  - bed rest  - reposition q 3 hrs  - incentive spirometry   - Pure wick for comfort  - On Baclofen pump and continue Baclofen 20 mg BID, Duloxetine 30 mg BID and Gabapentin 800 mg TID.    Problem/Plan - 7:  ·  Problem: HTN (Hypertension).    Plan: - C/w Labetalol with parameters    Problem/Plan - 8:  ·  Problem: Protein calorie Malnutrition.  - start supplement when able   - consider nutrition consult

## 2019-10-28 NOTE — H&P ADULT - NSICDXFAMILYHX_GEN_ALL_CORE_FT
FAMILY HISTORY:  No pertinent family history in first degree relatives, pt does not recall family history of medical problems in mother or father, both

## 2019-10-28 NOTE — ED ADULT NURSE NOTE - ED STAT RN HANDOFF DETAILS
Pt presented to ED for evaluation of low H&H. Labs drawn by previous shift. Pending possible transfusion. VSS, pt is in no acute distress at this time. Hx of paraplegia, turned and repositioned to side with pillows. NSR on tele monitor. Safety/fall precautions. Pt's  was at the bedside but said he is "stepping out".

## 2019-10-28 NOTE — H&P ADULT - NEUROLOGICAL DETAILS
alert and oriented x 3/responds to pain/responds to verbal commands
I, Elizabeth Cheng M.D. have examined the patient and confirmed the essential components of the history, physical examination, diagnosis, and treatment plan. I agree with the patient's care as documented by the resident and amended herein by me. See note above for complete details of service.  60 yo M Hx multiple PMHx including primary Lung CA s/p lobectomy c/ brain mets s/p gamma knife surgery 5/8/2017 (Dr. Boudreaux) who pw ataxia, slurred speech today. Pt states that he was recently restarted on steroids s/p procedure. Pt also reports R chest pain at the site of his surgical procedure. Pt has had  LUE weakness and clumsiness since the gamma knife procedure. Exam reveals LUE weakness and wide based gait. Otherwise no other acute focal findings. Plan - CTH ro worsening met dz vs CVA, vs ICH, mass effect. Chest pain w/up. If no stroke/bleed or significant change on CT, admit to med for rad/onc eval and further mgmt.

## 2019-10-29 PROBLEM — K92.2 GASTROINTESTINAL HEMORRHAGE, UNSPECIFIED: Chronic | Status: ACTIVE | Noted: 2019-01-01

## 2019-10-29 PROBLEM — K92.1 MELENA: Chronic | Status: ACTIVE | Noted: 2019-01-01

## 2019-10-29 PROBLEM — K31.82 DIEULAFOY LESION (HEMORRHAGIC) OF STOMACH AND DUODENUM: Chronic | Status: ACTIVE | Noted: 2019-01-01

## 2019-10-29 PROBLEM — T14.8XXA OTHER INJURY OF UNSPECIFIED BODY REGION, INITIAL ENCOUNTER: Chronic | Status: ACTIVE | Noted: 2018-05-14

## 2019-10-29 NOTE — DIETITIAN INITIAL EVALUATION ADULT. - NAME AND PHONE
Adali Hanks MA, RDN, CDN, [facility]  [phone] (office number)  [phone] (pager number)

## 2019-10-29 NOTE — PROGRESS NOTE ADULT - ASSESSMENT
Pt is a 67 yo Female with a PMH/o aortic dissection s/p multiple repairs, spinal hematoma resulting in paraplegia (on baclofen pump), CVA, recurrent UTIs, HTN, PAD, and HSV endophthalmitis/keratitis s/p left corneal transplant who presents with lethargy, weakness, malaise.    #Anemia due to GI bleeding in setting of presumed Deiulafoy lesion as multiple w/u w/o source of bleeding  -  -Repeat EGD 10/18: neg for bleeding, consider Colonoscopy?  - CTAP with IV contrast ordered, awaiting CMP for renal function to perform  - Outpatient labs: h/h 5/16.9 plt 348   - GI consult Dr Yoo  - Protonix bid  - NPO  - h/h q 6 hrs   - f/u cbc, mg, phos, coags, cmp in AM  - consent signed in ED  - VCD boots for DVt proph  - fall precautions  - transfuse for goal >8    Problem/Plan - 2:  ·  Problem: Recent stercoral proctitis on CT  - Pt CTA pending, hold abx as no leukocytosis or fever  - Pt with abdominal exam concerning for fecal retention, in light of concern for     Problem/Plan - 3: nephrolithiasis/retained stent:  - Urology consult requested on last admission for retained left ureteral stent and ureterolithiasis as pt had L stent placement by Dr Lares 8/12/19 for a 1cm mid ureteral left stone.  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     Problem/Plan - 6:Paraplegia.   Plan: - Due to past spinal hematoma complicated by neuropathic pain  - fall precautions  - bed rest  - reposition q 3 hrs  - incentive spirometry   - Pure wick for comfort  - On Baclofen pump and continue Baclofen 20 mg BID, Duloxetine 30 mg BID and Gabapentin 800 mg TID.    Problem/Plan - 7:  ·  Problem: HTN (Hypertension).    Plan: - C/w Labetalol with parameters    Problem/Plan - 8:  ·  Problem: Protein calorie Malnutrition.  - start supplement when able   - consider nutrition consult Pt is a 69 yo Female with a PMH/o aortic dissection s/p multiple repairs, spinal hematoma resulting in paraplegia (on baclofen pump), CVA, recurrent UTIs, HTN, PAD, and HSV endophthalmitis/keratitis s/p left corneal transplant who presents with lethargy, weakness, malaise and found with an HB 5.2.    #Symptomatic anemia  - Mostly due to GI bleeding in setting of presumed Deiulafoy lesion in the past. Pt with recent GI w/u with EGD , capsule w/o source of bleeding  - Admission Hb 5.2  - s/p 2 PRBC transfusion with an HB 7.8   -  GI consult appreciated. Recommended to continue monitoring H&H.  - Consider EGD if active bleeding or pt become unstable.  - Continue monitoring H&H every 6 hrs  - Monitor for bleeding  - Monitor vital signs   - Continue Protonix 40 mg IV BID     # Stercoral proctitis  - CT abdomen remarkable for large amount of stool in the colon with rectal wall thickening and adjacent perirectal infiltrative changes compatible with rectal impaction due to possible stercoral proctitis.  - Continue in Zozyn    - ID consulted     # UTI   - Past Hx of ESBL Klebsiella infection  - ID consulted   - Continue in contact isolation  - F/U urine cultures     #nephrolithiasis/retained stent  - CT remarkable for mild left hydronephrosis, left collecting system stent and calculus noted within the left distal ureter.    -  L stent placement by Dr Lares 8/12/19 for a 1cm mid ureteral left stone.  - f/u with urology, previous recs d/w patient and family    # Keratitis , herpetic due to HSV s/p corneal transplant  - Cont Prednisolone, and Ofloxacin eye drops, Valtrex 1g TID.     #Paraplegia  - Due to spinal hematoma  - Reposition every 3 hr  - Continue pain management with baclofen    # Hypertension  - Continue with Labetalol Pt is a 69 yo Female with a PMH/o aortic dissection s/p multiple repairs, spinal hematoma resulting in paraplegia (on baclofen pump), CVA, recurrent UTIs, HTN, PAD, and HSV endophthalmitis/keratitis s/p left corneal transplant who presents with lethargy, weakness, malaise and found with an HB 5.2.    #Symptomatic anemia  - Mostly due to GI bleeding in setting of presumed Deiulafoy lesion in the past. Pt with recent GI w/u with EGD , capsule w/o source of bleeding  - Admission Hb 5.2  - s/p 2 PRBC transfusion with an HB 7.8   -  GI consult appreciated. Recommended to continue monitoring H&H.  - Consider EGD if active bleeding or pt become unstable.  - Continue monitoring H&H every 8 hrs  - Monitor for bleeding  - Monitor vital signs   - Continue Protonix 40 mg IV BID     # Stercoral proctitis  - CT abdomen remarkable for large amount of stool in the colon with rectal wall thickening and adjacent perirectal infiltrative changes compatible with rectal impaction due to possible stercoral proctitis.  - Continue in Zozyn    - ID consulted     # UTI   - Past Hx of ESBL Klebsiella infection  - ID consulted   - Continue in contact isolation  - F/U urine cultures     #nephrolithiasis/retained stent  - CT remarkable for mild left hydronephrosis, left collecting system stent and calculus noted within the left distal ureter.    - L stent placement by Dr Lares 8/12/19 for a 1cm mid ureteral left stone.  - f/u with urology, previous recs d/w patient and family    # Keratitis , herpetic due to HSV s/p corneal transplant  - Cont Prednisolone, and Ofloxacin eye drops, Valtrex 1g TID.     #Paraplegia  - Due to spinal hematoma  - Reposition every 3 hr  - Continue pain management with baclofen    # Hypertension  - Continue with Labetalol     Case discussed with Dr Almeida

## 2019-10-29 NOTE — CHART NOTE - NSCHARTNOTEFT_GEN_A_CORE
Upon Nutritional Assessment by the Registered Dietitian your patient was determined to meet criteria / has evidence of the following diagnosis/diagnoses:          [ ]  Mild Protein Calorie Malnutrition        [ ]  Moderate Protein Calorie Malnutrition        [x] Severe Protein Calorie Malnutrition        [ ] Unspecified Protein Calorie Malnutrition        [x] Underweight / BMI <19        [ ] Morbid Obesity / BMI > 40      Findings:  severe malnutrition in chronic illness r/t decreased ability to consume sufficient energy/protein 2/2 GIB AEB intermittent PO intake meeting <75% of ENN, severe muscle/mod fat wasting; mod edema    Findings as based on:  •  Comprehensive nutrition assessment and consultation  •  Calorie counts (nutrient intake analysis)  •  Food acceptance and intake status from observations by staff  •  Follow up  •  Patient education  •  Intervention secondary to interdisciplinary rounds  •   concerns      Treatment:    The following diet has been recommended:  1) when feasible, advance diet as tolerated to soft with milkshake TID   2) consider checking nutrient panel: B12, folate, iron, vitamin D and replete prn   3) daily wt checks   4) monitor PO intake/tolerance    PROVIDER Section:     By signing this assessment you are acknowledging and agree with the diagnosis/diagnoses assigned by the Registered Dietitian    Comments:

## 2019-10-29 NOTE — DIETITIAN INITIAL EVALUATION ADULT. - PERTINENT LABORATORY DATA
10-29 Na138 mmol/L Glu 99 mg/dL K+ 3.9 mmol/L Cr  0.42 mg/dL<L> BUN 22 mg/dL Phos 3.5 mg/dL Alb 2.1 g/dL<L> PAB n/a

## 2019-10-29 NOTE — CONSULT NOTE ADULT - ASSESSMENT
67 y/o female with multiple episodes of recurrent gi bleeding now with dark stools with significant past GI Hx:  Melena: 10/7/2019  Gastrointestinal hemorrhage: 9/28/2019, 9/4/2019, 8/29/2019  Dieulafoy lesion of stomach or duodenum: 10/1/2019    Now readmitted with symptomatic anemia/dark stools, likely reoccurring ugib?   CT scan noted, with possible fecal impaction.   Pt s/p x2 dark large bowel movements overnight.   Trend H/H s/p x2 units of PRBCs.   Discussed with Dr. Ambrocio (previously did gi work up including x2 capsule studies and push enteroscopy as well).  Keep NPO now for possible EGD with Dr. Yoo today?   Discussed with pt, Dr. Yoo, and nursing staff.

## 2019-10-29 NOTE — PATIENT PROFILE ADULT - FALL HARM RISK
other/bones(Osteoporosis,prev fx,steroid use,metastatic bone ca)/coagulation(Bleeding disorder R/T clinical cond/anti-coags)/paraplegia

## 2019-10-29 NOTE — DIETITIAN INITIAL EVALUATION ADULT. - ADD RECOMMEND
1) when feasible, advance diet as tolerated to soft with milkshake TID 2) consider checking nutrient panel: B12, folate, iron, vitamin D and replete prn 3) daily wt checks 4) monitor PO intake/tolerance

## 2019-10-29 NOTE — DIETITIAN INITIAL EVALUATION ADULT. - FACTORS AFF FOOD INTAKE
pt reports when able to eat; eats full meals.  However, pt with intermittent inability to eat 2/2 GIB which lasts up to 1 wk at a time./other (specify)

## 2019-10-29 NOTE — PROGRESS NOTE ADULT - SUBJECTIVE AND OBJECTIVE BOX
Pt has been seen and examined with FP resident, resident supervised agree with a/p       Patient is a 68y old  Female who presents with a chief complaint of symptomatic anemia (29 Oct 2019 08:21)      PHYSICAL EXAM:  Vital Signs Last 24 Hrs  T(C): 37.3 (29 Oct 2019 13:41), Max: 37.3 (29 Oct 2019 09:16)  T(F): 99.2 (29 Oct 2019 13:41), Max: 99.2 (29 Oct 2019 09:16)  HR: 100 (29 Oct 2019 15:00) (86 - 130)  BP: 140/60 (29 Oct 2019 15:00) (61/40 - 141/66)  BP(mean): 77 (29 Oct 2019 15:00) (47 - 89)  RR: 18 (29 Oct 2019 15:00) (11 - 30)  SpO2: 97% (29 Oct 2019 15:00) (93% - 100%)  general- comfortable   -[id number]-aeeb,cta  -cvs-s1s2 normal   -p/a-soft,bs+        A/P    #Acute anemia due to blood loss due to possible upper gi bleed   -s/p transfusion, ct to monitor it, EGD  -PPI     #Supportive care

## 2019-10-29 NOTE — CHART NOTE - NSCHARTNOTEFT_GEN_A_CORE
Called to assess pt for hypotension to 61/31 . Pt diaphoretic and lethargic on exam with constricted pupils. Upon chart review, pt received PO oxycodone prior to arrival to SICU due to severe agitation and pt complaints of severe pain. Pt with similar episode s/p CT scan in ED and IV morphine given. Pt received naloxone 0.4 x 2 doses and is now AAOx4 with /63, . PRBC running and with IVF bolus. IV tylenol ordered for pain. Will continue to follow closely. Pt self caths at home, bladder scan q 8 hrs with RN to perform intermittent cath if >300cc. Purewick in place. NO IV OPIOIDS.     D/W SICU RN  Pt upgraded to stepdown.

## 2019-10-29 NOTE — PROGRESS NOTE ADULT - SUBJECTIVE AND OBJECTIVE BOX
HPI:  Pt is a 69 yo Female with a PMH/o aortic dissection s/p multiple repairs, spinal hematoma resulting in paraplegia (on baclofen pump), CVA, nephrolithiasis with retained stent (did not f/u to remove as per pt s/p d/c home), recurrent UTIs, HTN, PAD, and HSV endophthalmitis/keratitis s/p left corneal transplant who presents with lethargy, weakness, malaise. 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 Today pt received call about abnormal labs with hgb of 4. Pt denies any bright red bleeding per rectum, vomiting, diarrhea, nausea, abdominal pain, but does endorse dark stool. Of note, Pt has been hospitalized multiple times with endoscopies and capsule endoscopies, and CTA's which have all been without source of bleeding. In past pt episodes complicated by hypotension and BRBPR while inpatient/on admission.     SUBJECTIVE:  Pt is evaluated at bedside and found AAOX3, hemodynamically stable and in no acute distress. Pt is s/p 2 PRBC transfusion with a Hb 7.8. 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 Gastroenterology service consulted a recommended to continue trending H&H at this moment and consider EGD if active bleeding. She denies recent  bleeding od dark stool movement She c/o lumbar back pain of an intensity 7/10.Denies abdominal pain, nausea, vomits, fever, chills or any other symptoms.     Review of Symptoms  Gen:+ fatigue.No fevers, chills, sweats, weight loss  Visual: no recent changes in vision, no blurriness, no seeing spots  Cardiovascular: no chest pain, no palpitations, no orthopnea, no leg swelling  Respiratory: no shortness of breath, no exertional dyspnea, no cough, no rhinorrhea, no nasal congestion  GI:+ melena. No difficulty swallowing, no nausea, no vomiting, no abdominal pain, no diarrhea, no constipation  : no dysuria, no increased freq, no hematuria, no malodorous urine  Derm: no wounds, no rashes  Heme: no easy bleeding or bruising  MSK:+ Back pain. No joint pain, no joint swelling or redness, no extremity pain   Neuro: no headache, no numbness, no weakness, no memory loss  Psych: no depression, no anxiety, no SI    MEDICATIONS  (STANDING):  atorvastatin 40 milliGRAM(s) Oral at bedtime  baclofen 20 milliGRAM(s) Oral two times a day  DULoxetine 30 milliGRAM(s) Oral two times a day  gabapentin 800 milliGRAM(s) Oral three times a day  labetalol 200 milliGRAM(s) Oral two times a day  ofloxacin 0.3% Solution 1 Drop(s) Both EYES four times a day  pantoprazole  Injectable 40 milliGRAM(s) IV Push two times a day  prednisoLONE acetate 1% Suspension 1 Drop(s) Both EYES four times a day  valACYclovir 1000 milliGRAM(s) Oral three times a day    MEDICATIONS  (PRN):  acetaminophen   Tablet .. 650 milliGRAM(s) Oral every 6 hours PRN Temp greater or equal to 38C (100.4F), Mild Pain (1 - 3)  artificial  tears Solution 1 Drop(s) Both EYES every 2 hours PRN Dry Eyes  lidocaine   Patch 1 Patch Transdermal daily PRN pain  oxyCODONE    IR 10 milliGRAM(s) Oral three times a day PRN Severe Pain (7 - 10)      Vital Signs Last 24 Hrs  T(C): 37.1 (29 Oct 2019 16:45), Max: 37.3 (29 Oct 2019 09:16)  T(F): 98.7 (29 Oct 2019 16:45), Max: 99.2 (29 Oct 2019 09:16)  HR: 95 (29 Oct 2019 16:00) (86 - 130)  BP: 120/64 (29 Oct 2019 16:00) (61/40 - 141/66)  BP(mean): 80 (29 Oct 2019 16:00) (47 - 89)  RR: 14 (29 Oct 2019 16:00) (11 - 30)  SpO2: 97% (29 Oct 2019 16:00) (93% - 100%)    GEN: NAD, comfortable, resting in bed  HEENT: NC/AT, EOMI, PERRLA, MMM, pale conjunctiva,  normal hearing, no nasal discharge, throat clear, no thrush, normal dentition.   NECK: supple, no JVD, no LAD, no thyromegaly  BACK:  ROM intact, no spinal/paraspinal tenderness  CV: S1S2, RRR, no mumur  RESP: good air movement, CTABL, no rales, rhonchi or wheezing, respirations unlabored  ABD: +BS, soft, ND, NT, no guarding, no rigidity, no HSM  EXT: +2 radial and pedal pulses, no edema, no calve tenderness  SKIN: No visible Rashes or Ulcers  NEURO: paraplegic, decrease motor strength in lower extremities.   PSYCH: normal behavior         LABS:                          7.8    16.92 )-----------( 324      ( 29 Oct 2019 06:21 )             24.2     29 Oct 2019 06:22    138    |  109    |  22     ----------------------------<  99     3.9     |  23     |  0.42     Ca    8.2        29 Oct 2019 06:22  Phos  3.5       29 Oct 2019 06:22  Mg     2.1       29 Oct 2019 06:22    TPro  5.2    /  Alb  2.1    /  TBili  0.7    /  DBili  x      /  AST  18     /  ALT  19     /  AlkPhos  67     29 Oct 2019 06:22    LIVER FUNCTIONS - ( 29 Oct 2019 06:22 )  Alb: 2.1 g/dL / Pro: 5.2 gm/dL / ALK PHOS: 67 U/L / ALT: 19 U/L / AST: 18 U/L / GGT: x           PT/INR - ( 29 Oct 2019 06:22 )   PT: 11.7 sec;   INR: 1.05 ratio         PTT - ( 29 Oct 2019 06:22 )  PTT:25.1 sec  CAPILLARY BLOOD GLUCOSE            Urinalysis Basic - ( 29 Oct 2019 05:45 )    Color: Yellow / Appearance: Clear / S.010 / pH: x  Gluc: x / Ketone: Negative  / Bili: Negative / Urobili: Negative mg/dL   Blood: x / Protein: 30 mg/dL / Nitrite: Positive   Leuk Esterase: Moderate / RBC: 3-5 /HPF / WBC >50   Sq Epi: x / Non Sq Epi: Few / Bacteria: TNTC        RADIOLOGY:     CT Abdomen and Pelvis w/ IV Cont (10.28.19 @ 21:47) >    IMPRESSION:     1. Infrarenal abdominal aortic dissection is again noted and is not   significantly changed.   2. Mild left hydronephrosis. Left renal collecting system stent. 6 x 9   mm, calculus noted within the left distal ureter, without significant   change. Tiny   left renal calculus.   3. Large amount of stool in the colon with rectal wall thickening and   adjacent perirectal infiltrative changes compatible with rectal   impaction, clinically correlate to exclude stercoral proctitis. HPI:  Pt is a 67 yo Female with a PMH/o aortic dissection s/p multiple repairs, spinal hematoma resulting in paraplegia (on baclofen pump), CVA, nephrolithiasis with retained stent (did not f/u to remove as per pt s/p d/c home), recurrent UTIs, HTN, PAD, and HSV endophthalmitis/keratitis s/p left corneal transplant who presents with lethargy, weakness, malaise. 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 Today pt received call about abnormal labs with hgb of 4. Pt denies any bright red bleeding per rectum, vomiting, diarrhea, nausea, abdominal pain, but does endorse dark stool. Of note, Pt has been hospitalized multiple times with endoscopies and capsule endoscopies, and CTA's which have all been without source of bleeding. In past pt episodes complicated by hypotension and BRBPR while inpatient/on admission.     SUBJECTIVE:  Pt is evaluated at bedside and found AAOX3, hemodynamically stable and in no acute distress. Pt is s/p 2 PRBC transfusion with a Hb 7.8. 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 Gastroenterology service consulted a recommended to continue trending H&H at this moment and consider EGD if active bleeding. She denies recent  bleeding or dark stool movement She c/o lumbar back pain of an intensity 7/10.Denies abdominal pain, nausea, vomits, fever, chills or any other symptoms.     Review of Symptoms  Gen:+ fatigue.No fevers, chills, sweats, weight loss  Visual: no recent changes in vision, no blurriness, no seeing spots  Cardiovascular: no chest pain, no palpitations, no orthopnea, no leg swelling  Respiratory: no shortness of breath, no exertional dyspnea, no cough, no rhinorrhea, no nasal congestion  GI:+ melena. No difficulty swallowing, no nausea, no vomiting, no abdominal pain, no diarrhea, no constipation  : no dysuria, no increased freq, no hematuria, no malodorous urine  Derm: no wounds, no rashes  Heme: no easy bleeding or bruising  MSK:+ Back pain. No joint pain, no joint swelling or redness, no extremity pain   Neuro: no headache, no numbness, no weakness, no memory loss  Psych: no depression, no anxiety, no SI    MEDICATIONS  (STANDING):  atorvastatin 40 milliGRAM(s) Oral at bedtime  baclofen 20 milliGRAM(s) Oral two times a day  DULoxetine 30 milliGRAM(s) Oral two times a day  gabapentin 800 milliGRAM(s) Oral three times a day  labetalol 200 milliGRAM(s) Oral two times a day  ofloxacin 0.3% Solution 1 Drop(s) Both EYES four times a day  pantoprazole  Injectable 40 milliGRAM(s) IV Push two times a day  prednisoLONE acetate 1% Suspension 1 Drop(s) Both EYES four times a day  valACYclovir 1000 milliGRAM(s) Oral three times a day    MEDICATIONS  (PRN):  acetaminophen   Tablet .. 650 milliGRAM(s) Oral every 6 hours PRN Temp greater or equal to 38C (100.4F), Mild Pain (1 - 3)  artificial  tears Solution 1 Drop(s) Both EYES every 2 hours PRN Dry Eyes  lidocaine   Patch 1 Patch Transdermal daily PRN pain  oxyCODONE    IR 10 milliGRAM(s) Oral three times a day PRN Severe Pain (7 - 10)      Vital Signs Last 24 Hrs  T(C): 37.1 (29 Oct 2019 16:45), Max: 37.3 (29 Oct 2019 09:16)  T(F): 98.7 (29 Oct 2019 16:45), Max: 99.2 (29 Oct 2019 09:16)  HR: 95 (29 Oct 2019 16:00) (86 - 130)  BP: 120/64 (29 Oct 2019 16:00) (61/40 - 141/66)  BP(mean): 80 (29 Oct 2019 16:00) (47 - 89)  RR: 14 (29 Oct 2019 16:00) (11 - 30)  SpO2: 97% (29 Oct 2019 16:00) (93% - 100%)    GEN: NAD, comfortable, resting in bed  HEENT: NC/AT, EOMI, PERRLA, MMM, pale conjunctiva,  normal hearing, no nasal discharge, throat clear, no thrush, normal dentition.   NECK: supple, no JVD, no LAD, no thyromegaly  BACK:  ROM intact, no spinal/paraspinal tenderness  CV: S1S2, RRR, no mumur  RESP: good air movement, CTABL, no rales, rhonchi or wheezing, respirations unlabored  ABD: +BS, soft, ND, NT, no guarding, no rigidity, no HSM  EXT: +2 radial and pedal pulses, no edema, no calve tenderness  SKIN: No visible Rashes or Ulcers  NEURO: paraplegic, decrease motor strength in lower extremities.   PSYCH: normal behavior         LABS:                          7.8    16.92 )-----------( 324      ( 29 Oct 2019 06:21 )             24.2     29 Oct 2019 06:22    138    |  109    |  22     ----------------------------<  99     3.9     |  23     |  0.42     Ca    8.2        29 Oct 2019 06:22  Phos  3.5       29 Oct 2019 06:22  Mg     2.1       29 Oct 2019 06:22    TPro  5.2    /  Alb  2.1    /  TBili  0.7    /  DBili  x      /  AST  18     /  ALT  19     /  AlkPhos  67     29 Oct 2019 06:22    LIVER FUNCTIONS - ( 29 Oct 2019 06:22 )  Alb: 2.1 g/dL / Pro: 5.2 gm/dL / ALK PHOS: 67 U/L / ALT: 19 U/L / AST: 18 U/L / GGT: x           PT/INR - ( 29 Oct 2019 06:22 )   PT: 11.7 sec;   INR: 1.05 ratio         PTT - ( 29 Oct 2019 06:22 )  PTT:25.1 sec  CAPILLARY BLOOD GLUCOSE            Urinalysis Basic - ( 29 Oct 2019 05:45 )    Color: Yellow / Appearance: Clear / S.010 / pH: x  Gluc: x / Ketone: Negative  / Bili: Negative / Urobili: Negative mg/dL   Blood: x / Protein: 30 mg/dL / Nitrite: Positive   Leuk Esterase: Moderate / RBC: 3-5 /HPF / WBC >50   Sq Epi: x / Non Sq Epi: Few / Bacteria: TNTC        RADIOLOGY:     CT Abdomen and Pelvis w/ IV Cont (10.28.19 @ 21:47) >    IMPRESSION:     1. Infrarenal abdominal aortic dissection is again noted and is not   significantly changed.   2. Mild left hydronephrosis. Left renal collecting system stent. 6 x 9   mm, calculus noted within the left distal ureter, without significant   change. Tiny   left renal calculus.   3. Large amount of stool in the colon with rectal wall thickening and   adjacent perirectal infiltrative changes compatible with rectal   impaction, clinically correlate to exclude stercoral proctitis. HPI:  Pt is a 69 yo Female with a PMH/o aortic dissection s/p multiple repairs, spinal hematoma resulting in paraplegia (on baclofen pump), CVA, nephrolithiasis with retained stent (did not f/u to remove as per pt s/p d/c home), recurrent UTIs, HTN, PAD, and HSV endophthalmitis/keratitis s/p left corneal transplant who presents with lethargy, weakness, malaise. 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 Today pt received call about abnormal labs with hgb of 4. Pt denies any bright red bleeding per rectum, vomiting, diarrhea, nausea, abdominal pain, but does endorse dark stool. Of note, Pt has been hospitalized multiple times with endoscopies and capsule endoscopies, and CTA's which have all been without source of bleeding. In past pt episodes complicated by hypotension and BRBPR while inpatient/on admission.     SUBJECTIVE:  Pt is evaluated at bedside and found AAOX3, hemodynamically stable and in no acute distress. Pt is s/p 2 PRBC transfusion with a Hb 7.8. 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 Gastroenterology service consulted a recommended to continue trending H&H at this moment and consider EGD if active bleeding or unstable. She denies recent  bleeding or dark stool movement. She c/o lumbar back pain of an intensity 7/10.Denies abdominal pain, nausea, vomits, fever, chills or any other symptoms.     Review of Symptoms  Gen:+ fatigue. No fevers, chills, sweats, weight loss  Visual: no recent changes in vision, no blurriness, no seeing spots  Cardiovascular: no chest pain, no palpitations, no orthopnea, no leg swelling  Respiratory: no shortness of breath, no exertional dyspnea, no cough, no rhinorrhea, no nasal congestion  GI:+ melena. No difficulty swallowing, no nausea, no vomiting, no abdominal pain, no diarrhea, no constipation  : no dysuria, no increased freq, no hematuria, no malodorous urine  Derm: no wounds, no rashes  Heme: no easy bleeding or bruising  MSK:+ Back pain. No joint pain, no joint swelling or redness, no extremity pain   Neuro: no headache, no numbness, no weakness, no memory loss  Psych: no depression, no anxiety, no SI    MEDICATIONS  (STANDING):  atorvastatin 40 milliGRAM(s) Oral at bedtime  baclofen 20 milliGRAM(s) Oral two times a day  DULoxetine 30 milliGRAM(s) Oral two times a day  gabapentin 800 milliGRAM(s) Oral three times a day  labetalol 200 milliGRAM(s) Oral two times a day  ofloxacin 0.3% Solution 1 Drop(s) Both EYES four times a day  pantoprazole  Injectable 40 milliGRAM(s) IV Push two times a day  prednisoLONE acetate 1% Suspension 1 Drop(s) Both EYES four times a day  valACYclovir 1000 milliGRAM(s) Oral three times a day    MEDICATIONS  (PRN):  acetaminophen   Tablet .. 650 milliGRAM(s) Oral every 6 hours PRN Temp greater or equal to 38C (100.4F), Mild Pain (1 - 3)  artificial  tears Solution 1 Drop(s) Both EYES every 2 hours PRN Dry Eyes  lidocaine   Patch 1 Patch Transdermal daily PRN pain  oxyCODONE    IR 10 milliGRAM(s) Oral three times a day PRN Severe Pain (7 - 10)      Vital Signs Last 24 Hrs  T(C): 37.1 (29 Oct 2019 16:45), Max: 37.3 (29 Oct 2019 09:16)  T(F): 98.7 (29 Oct 2019 16:45), Max: 99.2 (29 Oct 2019 09:16)  HR: 95 (29 Oct 2019 16:00) (86 - 130)  BP: 120/64 (29 Oct 2019 16:00) (61/40 - 141/66)  BP(mean): 80 (29 Oct 2019 16:00) (47 - 89)  RR: 14 (29 Oct 2019 16:00) (11 - 30)  SpO2: 97% (29 Oct 2019 16:00) (93% - 100%)    GEN: NAD, comfortable, resting in bed  HEENT: NC/AT, EOMI, PERRLA, MMM, pale conjunctiva,  normal hearing, no nasal discharge, throat clear, no thrush, normal dentition.   NECK: supple, no JVD, no LAD, no thyromegaly  BACK:  ROM intact, no spinal/paraspinal tenderness  CV: S1S2, RRR, no mumur  RESP: good air movement, CTABL, no rales, rhonchi or wheezing, respirations unlabored  ABD: +BS, soft, ND, NT, no guarding, no rigidity, no HSM  EXT: +2 radial and pedal pulses, no edema, no calve tenderness  SKIN: No visible Rashes or Ulcers  NEURO: paraplegic, decrease motor strength in lower extremities.   PSYCH: normal behavior         LABS:                          7.8    16.92 )-----------( 324      ( 29 Oct 2019 06:21 )             24.2     29 Oct 2019 06:22    138    |  109    |  22     ----------------------------<  99     3.9     |  23     |  0.42     Ca    8.2        29 Oct 2019 06:22  Phos  3.5       29 Oct 2019 06:22  Mg     2.1       29 Oct 2019 06:22    TPro  5.2    /  Alb  2.1    /  TBili  0.7    /  DBili  x      /  AST  18     /  ALT  19     /  AlkPhos  67     29 Oct 2019 06:22    LIVER FUNCTIONS - ( 29 Oct 2019 06:22 )  Alb: 2.1 g/dL / Pro: 5.2 gm/dL / ALK PHOS: 67 U/L / ALT: 19 U/L / AST: 18 U/L / GGT: x           PT/INR - ( 29 Oct 2019 06:22 )   PT: 11.7 sec;   INR: 1.05 ratio         PTT - ( 29 Oct 2019 06:22 )  PTT:25.1 sec  CAPILLARY BLOOD GLUCOSE            Urinalysis Basic - ( 29 Oct 2019 05:45 )    Color: Yellow / Appearance: Clear / S.010 / pH: x  Gluc: x / Ketone: Negative  / Bili: Negative / Urobili: Negative mg/dL   Blood: x / Protein: 30 mg/dL / Nitrite: Positive   Leuk Esterase: Moderate / RBC: 3-5 /HPF / WBC >50   Sq Epi: x / Non Sq Epi: Few / Bacteria: TNTC        RADIOLOGY:     CT Abdomen and Pelvis w/ IV Cont (10.28.19 @ 21:47) >    IMPRESSION:     1. Infrarenal abdominal aortic dissection is again noted and is not   significantly changed.   2. Mild left hydronephrosis. Left renal collecting system stent. 6 x 9   mm, calculus noted within the left distal ureter, without significant   change. Tiny   left renal calculus.   3. Large amount of stool in the colon with rectal wall thickening and   adjacent perirectal infiltrative changes compatible with rectal   impaction, clinically correlate to exclude stercoral proctitis.

## 2019-10-29 NOTE — DIETITIAN INITIAL EVALUATION ADULT. - PERTINENT MEDS FT
MEDICATIONS  (STANDING):  atorvastatin 40 milliGRAM(s) Oral at bedtime  baclofen 20 milliGRAM(s) Oral two times a day  DULoxetine 30 milliGRAM(s) Oral two times a day  gabapentin 800 milliGRAM(s) Oral three times a day  labetalol 200 milliGRAM(s) Oral two times a day  ofloxacin 0.3% Solution 1 Drop(s) Both EYES four times a day  pantoprazole  Injectable 40 milliGRAM(s) IV Push two times a day  prednisoLONE acetate 1% Suspension 1 Drop(s) Both EYES four times a day  valACYclovir 1000 milliGRAM(s) Oral three times a day    MEDICATIONS  (PRN):  acetaminophen   Tablet .. 650 milliGRAM(s) Oral every 6 hours PRN Temp greater or equal to 38C (100.4F), Mild Pain (1 - 3)  artificial  tears Solution 1 Drop(s) Both EYES every 2 hours PRN Dry Eyes  lidocaine   Patch 1 Patch Transdermal daily PRN pain  oxyCODONE    IR 10 milliGRAM(s) Oral three times a day PRN Severe Pain (7 - 10)

## 2019-10-29 NOTE — DIETITIAN INITIAL EVALUATION ADULT. - MALNUTRITION
severe malnutrition in chronic illness r/t decreased ability to consume sufficient energy/protein 2/2 GIB AEB intermittent PO intake meeting <75% of ENN, severe muscle/mod fat wasting; mod edema severe malnutrition in chronic illness

## 2019-10-29 NOTE — DIETITIAN INITIAL EVALUATION ADULT. - PHYSICAL APPEARANCE
other (specify)/underweight NFPE significant for severe muscle wasting; clavicle, temporal.  moderate muscle wasting; deltoid.  moderate fat wasting: tricep.  moderate Lt/Rt foot/ankle edema  manas score of 10: stage 2 PU on sacrum.

## 2019-10-29 NOTE — CONSULT NOTE ADULT - SUBJECTIVE AND OBJECTIVE BOX
Patient is a 68y old  Female who presents with a chief complaint of symptomatic anemia (28 Oct 2019 22:12)    HPI:  Pt is a 67 yo Female with a PMH/o aortic dissection s/p multiple repairs, spinal hematoma resulting in paraplegia (on baclofen pump), CVA, nephrolithiasis with retained stent (did not f/u to remove as per pt s/p d/c home), recurrent UTIs, HTN, PAD, and HSV endophthalmitis/keratitis s/p left corneal transplant who presents with lethargy, weakness, malaise. 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 Today pt received call about abnormal labs with hgb of 4. Pt denies any bright red bleeding per rectum, vomiting, diarrhea, nausea, abdominal pain, but does endorse dark stool. Of note, Pt has been hospitalized multiple times with endoscopies and capsule endoscopies, and CTA's which have all been without source of bleeding. In past pt episodes complicated by hypotension and BRBPR while inpatient/on admission. (28 Oct 2019 22:12)    10--  Overnight events noted.   Pt with severe lower back pain.   Per RNs-x2 large dark stools overnight.   S/P x2 units of PRBCs.   No n/v.     PAST MEDICAL & SURGICAL HISTORY:  Melena: 10/7/2019  Gastrointestinal hemorrhage: 2019, 2019, 2019  Dieulafoy lesion of stomach or duodenum: 10/1/2019  Subdural hematoma, nontraumatic: spontaneous  Dorsalgia of lumbar region: on pain medication /baclofen po and pump  Self-catheterizes urinary bladder  Anemia: chronic  Uveitis  Osteoporosis  PAD (peripheral artery disease)  Hematoma: spinal treated 2018  Paraplegia: due to spinal hematoma, on wheelchair goes to physical therapy 2 x weekly  Aortic dissection, thoracic: Type A Repaired   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 2018  UTI (urinary tract infection): recurrent  TIA (transient ischemic attack)  HTN (Hypertension)  History of corneal transplant: left corneal transplant on 2018  Disorder of spine: unthetethering 2 x  Presence of IVC filter:  ?  S/P aortic dissection repair: Type A Dissection repair /   descending aortic aneurysm repair 2016  H/O Spinal surgery: laminectomies 2014    MEDICATIONS  (STANDING):  atorvastatin 40 milliGRAM(s) Oral at bedtime  baclofen 20 milliGRAM(s) Oral two times a day  DULoxetine 30 milliGRAM(s) Oral two times a day  gabapentin 800 milliGRAM(s) Oral three times a day  labetalol 200 milliGRAM(s) Oral two times a day  ofloxacin 0.3% Solution 1 Drop(s) Both EYES four times a day  pantoprazole  Injectable 40 milliGRAM(s) IV Push two times a day  prednisoLONE acetate 1% Suspension 1 Drop(s) Both EYES four times a day  valACYclovir 1000 milliGRAM(s) Oral three times a day    MEDICATIONS  (PRN):  acetaminophen   Tablet .. 650 milliGRAM(s) Oral every 6 hours PRN Temp greater or equal to 38C (100.4F), Mild Pain (1 - 3)  artificial  tears Solution 1 Drop(s) Both EYES every 2 hours PRN Dry Eyes  lidocaine   Patch 1 Patch Transdermal daily PRN pain  oxyCODONE    IR 10 milliGRAM(s) Oral three times a day PRN Severe Pain (7 - 10)    Allergies  No Known Allergies    FAMILY HISTORY:  No pertinent family history in first degree relatives: pt does not recall family history of medical problems in mother or father, both     REVIEW OF SYSTEMS:  CONSTITUTIONAL: + weakness.  No fevers or chills  RESPIRATORY: No cough, wheezing, hemoptysis; No shortness of breath  CARDIOVASCULAR: No chest pain or palpitations  GASTROINTESTINAL: Per HPI.     Vital Signs Last 24 Hrs  T(C): 36.8 (29 Oct 2019 05:00), Max: 36.8 (28 Oct 2019 18:47)  T(F): 98.2 (29 Oct 2019 05:00), Max: 98.3 (28 Oct 2019 18:47)  HR: 104 (29 Oct 2019 08:00) (86 - 130)  BP: 132/58 (29 Oct 2019 06:00) (61/40 - 141/66)  BP(mean): 79 (29 Oct 2019 06:00) (47 - 88)  RR: 14 (29 Oct 2019 08:00) (11 - 30)  SpO2: 97% (29 Oct 2019 08:00) (93% - 100%)    PHYSICAL EXAM:  Constitutional: Middle aged female in mild distress 2/2 lower back pain, paraplegic   Respiratory: CTAB  Cardiovascular: S1 and S2, RRR, no M/G/R  Gastrointestinal: BS+, soft, NT/ND, pain pump.     LABS:                        7.8    16.92 )-----------( 324      ( 29 Oct 2019 06:21 )             24.2     10-29    138  |  109<H>  |  22  ----------------------------<  99  3.9   |  23  |  0.42<L>    Ca    8.2<L>      29 Oct 2019 06:22  Phos  3.5     10-29  Mg     2.1     10-29    TPro  5.2<L>  /  Alb  2.1<L>  /  TBili  0.7  /  DBili  x   /  AST  18  /  ALT  19  /  AlkPhos  67  10-29    PT/INR - ( 29 Oct 2019 06:22 )   PT: 11.7 sec;   INR: 1.05 ratio         PTT - ( 29 Oct 2019 06:22 )  PTT:25.1 sec  LIVER FUNCTIONS - ( 29 Oct 2019 06:22 )  Alb: 2.1 g/dL / Pro: 5.2 gm/dL / ALK PHOS: 67 U/L / ALT: 19 U/L / AST: 18 U/L / GGT: x             RADIOLOGY & ADDITIONAL STUDIES:

## 2019-10-29 NOTE — DIETITIAN INITIAL EVALUATION ADULT. - OTHER INFO
69yo female with PMH of aortic dissection s/p multiple repairs, spinal hematoma, resulting in paraplegia, CVA< nephrolithiasis with retained stent, HTN, HSV endophthalmitis/keratitis s/p left corneal transplant p/w lethargy, malaise.  Pt with recurrent GI bleeds.  Pt readmitted with symptomatic anemia/dark stools.  CT scan showed possible fecal impaction.  BM (+) 10/29.    Wt Hx: patient wt has fluctuated from 117-120# in past 3 months.  120.5# (10/28/19 with edema)  134.4# (10/18/19: with edema)-->likely error reading given usual wt's and current admission wt.  114.1# (10/8/19 with edema)  119# (9/2/19 with edema)  116.8# (8/13/19 with edema)  98# (2/7/18 no edema)

## 2019-10-30 NOTE — CONSULT NOTE ADULT - ASSESSMENT
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 The pt received call about abnormal labs with hgb of 4. Pt denies any bright red bleeding per rectum, vomiting, diarrhea, nausea, abdominal pain, but does endorse dark stool. In ER he ws noted with low grade fever. He received zosyn.     1. Low grade fever. Pyuria. Kidney stone. Left ureteral stent. Probable UTI. Severe anemia.   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 The pt received call about abnormal labs with hgb of 4. Pt denies any bright red bleeding per rectum, vomiting, diarrhea, nausea, abdominal pain, but does endorse dark stool. In ER he ws noted with low grade fever. He received zosyn.     1. Low grade fever. Pyuria. Kidney stone. Left ureteral stent. Probable UTI. h/o prior UTI with ESBL E.coli. Severe anemia.   -obtain BC x 2, urine c/s  -start meropenem 1 gm IV q8h  -reason for abx use and side effects reviewed with patient; monitor BMP   -contact isolation  -old chart reviewed to assess prior cultures  -monitor temps  -f/u CBC  -supportive care  2. 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 The pt received call about abnormal labs with hgb of 4. Pt denies any bright red bleeding per rectum, vomiting, diarrhea, nausea, abdominal pain, but does endorse dark stool. In ER he ws noted with low grade fever. He received zosyn.     1. Low grade fever. Pyuria. Kidney stone. Left ureteral stent. Probable UTI. h/o prior UTI with ESBL E.coli. Severe anemia.   -obtain BC x 2, urine c/s  -start meropenem 1 gm IV q8h  -reason for abx use and side effects reviewed with patient; monitor BMP   -contact isolation  -old chart reviewed to assess prior cultures  -urology evaluation  -monitor temps  -f/u CBC  -supportive care  2. Other issues:   -care per medicine

## 2019-10-30 NOTE — PROVIDER CONTACT NOTE (MEDICATION) - SITUATION
pt SBP this am 80-90 ( after med was given ) ... SBP labile 100 -140's current;y   pt needed blood this am and questioning if need to recheck

## 2019-10-30 NOTE — PROVIDER CONTACT NOTE (OTHER) - SITUATION
Spoke with Halina at office to inform  of consult
Spoke with Ann Marie at office to inform dr that patient is in [facility].  Please fax discharge papers to [phone].
left consult with answering service/arben

## 2019-10-30 NOTE — PROGRESS NOTE ADULT - SUBJECTIVE AND OBJECTIVE BOX
Dr. Parker was on call yesterday and was consulted for a next day consult on this patient. However this patient had the procedure done by Dr. Douglas (urologist), please consult Dr. Dixon (urologist) for this consult. Case discussed with Dr. Parker.

## 2019-10-30 NOTE — CONSULT NOTE ADULT - SUBJECTIVE AND OBJECTIVE BOX
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 The pt received call about abnormal labs with hgb of 4. Pt denies any bright red bleeding per rectum, vomiting, diarrhea, nausea, abdominal pain, but does endorse dark stool. In ER he ws noted with low grade fever. He received zosyn.       PMH: as above  PSH: as above  Meds: per reconciliation sheet, noted below  MEDICATIONS  (STANDING):  atorvastatin 40 milliGRAM(s) Oral at bedtime  baclofen 20 milliGRAM(s) Oral two times a day  DULoxetine 30 milliGRAM(s) Oral two times a day  gabapentin 800 milliGRAM(s) Oral three times a day  labetalol 200 milliGRAM(s) Oral two times a day  ofloxacin 0.3% Solution 1 Drop(s) Both EYES four times a day  pantoprazole  Injectable 40 milliGRAM(s) IV Push two times a day  piperacillin/tazobactam IVPB.. 3.375 Gram(s) IV Intermittent every 8 hours  polyethylene glycol/electrolyte Solution. 4000 milliLiter(s) Oral once  prednisoLONE acetate 1% Suspension 1 Drop(s) Both EYES four times a day  senna 2 Tablet(s) Oral at bedtime  valACYclovir 1000 milliGRAM(s) Oral three times a day    MEDICATIONS  (PRN):  acetaminophen   Tablet .. 650 milliGRAM(s) Oral every 6 hours PRN Temp greater or equal to 38C (100.4F), Mild Pain (1 - 3)  artificial  tears Solution 1 Drop(s) Both EYES every 2 hours PRN Dry Eyes  lidocaine   Patch 1 Patch Transdermal daily PRN pain  magnesium hydroxide Suspension 30 milliLiter(s) Oral daily PRN Constipation  oxyCODONE    IR 10 milliGRAM(s) Oral three times a day PRN Severe Pain (7 - 10)    Allergies    No Known Allergies    Intolerances      Social: no smoking, no alcohol, no illegal drugs; no recent travel, no exposure to TB  FAMILY HISTORY:  No pertinent family history in first degree relatives: pt does not recall family history of medical problems in mother or father, both     no history of premature cardiovascular disease in first degree relatives    ROS: the patient denies fever, no chills, no HA, no seizures, no dizziness, no sore throat, no nasal congestion, no blurry vision, no CP, no palpitations, no SOB, no cough, no abdominal pain, no diarrhea, no N/V, no dysuria, no leg pain, no claudication, no rash, no joint aches, no rectal pain or bleeding, no night sweats  All other systems reviewed and are negative    Vital Signs Last 24 Hrs  T(C): 36.3 (30 Oct 2019 09:39), Max: 37.3 (29 Oct 2019 13:41)  T(F): 97.4 (30 Oct 2019 09:39), Max: 99.2 (29 Oct 2019 13:41)  HR: 70 (30 Oct 2019 12:06) (70 - 106)  BP: 88/52 (30 Oct 2019 09:01) (88/52 - 141/59)  BP(mean): 64 (30 Oct 2019 09:01) (64 - 89)  RR: 12 (30 Oct 2019 12:06) (11 - 19)  SpO2: 95% (30 Oct 2019 12:06) (95% - 100%)  Daily     Daily Weight in k.8 (30 Oct 2019 05:00)    PE:    Constitutional: frail looking  HEENT: NC/AT, EOMI, PERRLA, conjunctivae clear; ears and nose atraumatic; pharynx benign  Neck: supple; thyroid not palpable  Back: no tenderness  Respiratory: respiratory effort normal; clear to auscultation  Cardiovascular: S1S2 regular, no murmurs  Abdomen: soft, not tender, not distended, positive BS; no liver or spleen organomegaly  Genitourinary: no suprapubic tenderness  Lymphatic: no LN palpable  Musculoskeletal: no muscle tenderness, no joint swelling or tenderness  Extremities: no pedal edema  Neurological/ Psychiatric: AxOx3, judgement and insight normal; moving all extremities  Skin: no rashes; no palpable lesions    Labs: all available labs reviewed                        6.9    7.02  )-----------( 225      ( 30 Oct 2019 11:06 )             21.5     10-30    143  |  110<H>  |  26<H>  ----------------------------<  99  4.0   |  27  |  0.47<L>    Ca    8.3<L>      30 Oct 2019 06:50  Phos  2.7     10-30  Mg     2.1     10-30    TPro  5.2<L>  /  Alb  2.1<L>  /  TBili  0.7  /  DBili  x   /  AST  18  /  ALT  19  /  AlkPhos  67  10-     LIVER FUNCTIONS - ( 29 Oct 2019 06:22 )  Alb: 2.1 g/dL / Pro: 5.2 gm/dL / ALK PHOS: 67 U/L / ALT: 19 U/L / AST: 18 U/L / GGT: x           Urinalysis Basic - ( 29 Oct 2019 05:45 )    Color: Yellow / Appearance: Clear / S.010 / pH: x  Gluc: x / Ketone: Negative  / Bili: Negative / Urobili: Negative mg/dL   Blood: x / Protein: 30 mg/dL / Nitrite: Positive   Leuk Esterase: Moderate / RBC: 3-5 /HPF / WBC >50   Sq Epi: x / Non Sq Epi: Few / Bacteria: TNTC      Radiology: all available radiological tests reviewed    < from: CT Abdomen and Pelvis w/ IV Cont (10.28.19 @ 21:47) >  1. Infrarenal abdominal aortic dissection is again noted and is not   significantly changed.   2. Mild left hydronephrosis. Left renal collecting system stent. 6 x 9   mm, calculus noted within the left distal ureter, without significant   change. Tiny   left renal calculus.   3. Large amount of stool in the colon with rectal wall thickening and   adjacent perirectal infiltrative changes compatible with rectal   impaction, clinically correlate to exclude stercoral proctitis.    < end of copied text >      Advanced directives addressed: full resuscitation

## 2019-10-30 NOTE — PROGRESS NOTE ADULT - ASSESSMENT
69 y/o female with multiple episodes of recurrent gi bleeding now with dark stools with significant past GI Hx:  Melena: 10/7/2019  Gastrointestinal hemorrhage: 9/28/2019, 9/4/2019, 8/29/2019  Dieulafoy lesion of stomach or duodenum: 10/1/2019    Now readmitted with symptomatic anemia/dark stools, likely reoccurring ugib, however can't r/o lower gi bleed, last night maroon colored stool was noted.  S/p another unit of PRBCs overnight.  Continue to monitor H/H and transfuse as needed.    Will plan for colonoscopy and egd tomorrow, however pt will be a very difficult prep.   Start clears now and keep NPO after midnight for procedure.   Discussed with pt, Dr. Yoo, nurses, and residents.

## 2019-10-30 NOTE — PROGRESS NOTE ADULT - SUBJECTIVE AND OBJECTIVE BOX
Pt has been seen and examined with FP resident, resident supervised agree with a/p       Patient is a 68y old  Female who presents with a chief complaint of symptomatic anemia (29 Oct 2019 08:21)      PHYSICAL EXAM:  general- comfortable   -rs-aeeb,cta  -cvs-s1s2 normal   -p/a-soft,bs+        A/P    #Acute anemia due to blood loss due to possible upper gi bleed   -s/p transfusion again dropped h/h -transfuse prbc   -PPI   -EGD tomorrow     #Supportive care

## 2019-10-30 NOTE — PROGRESS NOTE ADULT - SUBJECTIVE AND OBJECTIVE BOX
HPI:  Pt is a 69 yo Female with a PMH/o aortic dissection s/p multiple repairs, spinal hematoma resulting in paraplegia (on baclofen pump), CVA, nephrolithiasis with retained stent (did not f/u to remove as per pt s/p d/c home), recurrent UTIs, HTN, PAD, and HSV endophthalmitis/keratitis s/p left corneal transplant who presents with lethargy, weakness, malaise. 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 Today pt received call about abnormal labs with hgb of 4. Pt denies any bright red bleeding per rectum, vomiting, diarrhea, nausea, abdominal pain, but does endorse dark stool. Of note, Pt has been hospitalized multiple times with endoscopies and capsule endoscopies, and CTA's which have all been without source of bleeding. In past pt episodes complicated by hypotension and BRBPR while inpatient/on admission. (28     SUBJECTIVE:  Pt is evaluated at bedside and found AAOX3, hemodynamically stable and in no acute distress. Pt with a HB 6.7 that required  transfusion of 1 PRBC with mild improvement of HB to 7.2 . Pt report  two large dark bowel movent overnight. At 11 am HB 6.8 and a second PRBC was transfuse with improvement of HB to 7.7. Pt will have an endoscopy and colonoscopy tomorrow morning. Denies dizziness SOB, palpitation, abdominal pain, headache or any other symptoms.    Review of Symptoms  Gen: no fevers, chills, sweats, weight loss, fatigue  Visual: no recent changes in vision, no blurriness, no seeing spots  Cardiovascular: no chest pain, no palpitations, no orthopnea, no leg swelling  Respiratory: no shortness of breath, no exertional dyspnea, no cough, no rhinorrhea, no nasal congestion  GI:+ Melena. no difficulty swallowing, no nausea, no vomiting, no abdominal pain, no diarrhea, no constipation  : no dysuria, no increased freq, no hematuria, no malodorous urine  Derm: no wounds, no rashes  Heme: no easy bleeding or bruising  MSK: no joint pain, no joint swelling or redness, no extremity pain   Neuro: + paraplegia. No headache, no numbness, no weakness, no memory loss  Psych: no depression, no anxiety, no SI      MEDICATIONS  (STANDING):  atorvastatin 40 milliGRAM(s) Oral at bedtime  baclofen 20 milliGRAM(s) Oral two times a day  DULoxetine 30 milliGRAM(s) Oral two times a day  gabapentin 800 milliGRAM(s) Oral three times a day  labetalol 200 milliGRAM(s) Oral two times a day  meropenem  IVPB 1000 milliGRAM(s) IV Intermittent every 8 hours  ofloxacin 0.3% Solution 1 Drop(s) Both EYES four times a day  pantoprazole  Injectable 40 milliGRAM(s) IV Push two times a day  prednisoLONE acetate 1% Suspension 1 Drop(s) Both EYES four times a day  senna 2 Tablet(s) Oral at bedtime  valACYclovir 1000 milliGRAM(s) Oral three times a day    MEDICATIONS  (PRN):  acetaminophen   Tablet .. 650 milliGRAM(s) Oral every 6 hours PRN Temp greater or equal to 38C (100.4F), Mild Pain (1 - 3)  artificial  tears Solution 1 Drop(s) Both EYES every 2 hours PRN Dry Eyes  lidocaine   Patch 1 Patch Transdermal daily PRN pain  magnesium hydroxide Suspension 30 milliLiter(s) Oral daily PRN Constipation  oxyCODONE    IR 10 milliGRAM(s) Oral three times a day PRN Severe Pain (7 - 10)      Vital Signs Last 24 Hrs  T(C): 35.9 (30 Oct 2019 17:45), Max: 36.9 (29 Oct 2019 22:10)  T(F): 96.6 (30 Oct 2019 17:45), Max: 98.4 (29 Oct 2019 22:10)  HR: 73 (30 Oct 2019 17:01) (70 - 98)  BP: 116/58 (30 Oct 2019 17:01) (88/52 - 141/59)  BP(mean): 76 (30 Oct 2019 17:01) (64 - 91)  RR: 11 (30 Oct 2019 17:01) (11 - 18)  SpO2: 97% (30 Oct 2019 17:01) (94% - 100%)      GEN: NAD, comfortable, resting in bed  HEENT: NC/AT, EOMI, PERRLA, MMM, pale conjunctiva,  normal hearing, no nasal discharge, throat clear, no thrush, normal dentition.   NECK: supple, no JVD, no LAD, no thyromegaly  BACK:  ROM intact, no spinal/paraspinal tenderness  CV: S1S2, RRR, no mumur  RESP: good air movement, CTABL, no rales, rhonchi or wheezing, respirations unlabored  ABD: +BS, soft, ND, NT, no guarding, no rigidity, no HSM  EXT: +2 radial and pedal pulses, no edema, no calve tenderness  SKIN: No visible Rashes or Ulcers  NEURO: paraplegic, decrease motor strength in lower extremities.   PSYCH: normal behavior       LABS:                          7.7    9.82  )-----------( 267      ( 30 Oct 2019 20:00 )             23.7     30 Oct 2019 06:50    143    |  110    |  26     ----------------------------<  99     4.0     |  27     |  0.47     Ca    8.3        30 Oct 2019 06:50  Phos  2.7       30 Oct 2019 06:50  Mg     2.1       30 Oct 2019 06:50    TPro  5.2    /  Alb  2.1    /  TBili  0.7    /  DBili  x      /  AST  18     /  ALT  19     /  AlkPhos  67     29 Oct 2019 06:22    LIVER FUNCTIONS - ( 29 Oct 2019 06:22 )  Alb: 2.1 g/dL / Pro: 5.2 gm/dL / ALK PHOS: 67 U/L / ALT: 19 U/L / AST: 18 U/L / GGT: x           PT/INR - ( 29 Oct 2019 06:22 )   PT: 11.7 sec;   INR: 1.05 ratio         PTT - ( 29 Oct 2019 06:22 )  PTT:25.1 sec  CAPILLARY BLOOD GLUCOSE            Urinalysis Basic - ( 29 Oct 2019 05:45 )    Color: Yellow / Appearance: Clear / S.010 / pH: x  Gluc: x / Ketone: Negative  / Bili: Negative / Urobili: Negative mg/dL   Blood: x / Protein: 30 mg/dL / Nitrite: Positive   Leuk Esterase: Moderate / RBC: 3-5 /HPF / WBC >50   Sq Epi: x / Non Sq Epi: Few / Bacteria: TNTC

## 2019-10-30 NOTE — PROGRESS NOTE ADULT - SUBJECTIVE AND OBJECTIVE BOX
Patient is a 68y old  Female who presents with a chief complaint of symptomatic anemia (29 Oct 2019 16:52)    HPI:  Pt is a 69 yo Female with a PMH/o aortic dissection s/p multiple repairs, spinal hematoma resulting in paraplegia (on baclofen pump), CVA, nephrolithiasis with retained stent (did not f/u to remove as per pt s/p d/c home), recurrent UTIs, HTN, PAD, and HSV endophthalmitis/keratitis s/p left corneal transplant who presents with lethargy, weakness, malaise. 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 Today pt received call about abnormal labs with hgb of 4. Pt denies any bright red bleeding per rectum, vomiting, diarrhea, nausea, abdominal pain, but does endorse dark stool. Of note, Pt has been hospitalized multiple times with endoscopies and capsule endoscopies, and CTA's which have all been without source of bleeding. In past pt episodes complicated by hypotension and BRBPR while inpatient/on admission. (28 Oct 2019 22:12)    10/30/2019-  Pt still with lower back pain.   Per RN-x1 episode of maroon colored stool.   Tolerating diet.  No n/v.     PAST MEDICAL & SURGICAL HISTORY:  Melena: 10/7/2019  Gastrointestinal hemorrhage: 2019, 2019, 2019  Dieulafoy lesion of stomach or duodenum: 10/1/2019  Subdural hematoma, nontraumatic: spontaneous  Dorsalgia of lumbar region: on pain medication /baclofen po and pump  Self-catheterizes urinary bladder  Anemia: chronic  Uveitis  Osteoporosis  PAD (peripheral artery disease)  Hematoma: spinal treated 2018  Paraplegia: due to spinal hematoma, on wheelchair goes to physical therapy 2 x weekly  Aortic dissection, thoracic: Type A Repaired   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 2018  UTI (urinary tract infection): recurrent  TIA (transient ischemic attack)  HTN (Hypertension)  History of corneal transplant: left corneal transplant on 2018  Disorder of spine: unthetethering 2 x  Presence of IVC filter:  ?  S/P aortic dissection repair: Type A Dissection repair /   descending aortic aneurysm repair 2016  H/O Spinal surgery: laminectomies 2014    MEDICATIONS  (STANDING):  atorvastatin 40 milliGRAM(s) Oral at bedtime  baclofen 20 milliGRAM(s) Oral two times a day  DULoxetine 30 milliGRAM(s) Oral two times a day  gabapentin 800 milliGRAM(s) Oral three times a day  labetalol 200 milliGRAM(s) Oral two times a day  ofloxacin 0.3% Solution 1 Drop(s) Both EYES four times a day  pantoprazole  Injectable 40 milliGRAM(s) IV Push two times a day  piperacillin/tazobactam IVPB.. 3.375 Gram(s) IV Intermittent every 8 hours  prednisoLONE acetate 1% Suspension 1 Drop(s) Both EYES four times a day  senna 2 Tablet(s) Oral at bedtime  valACYclovir 1000 milliGRAM(s) Oral three times a day    MEDICATIONS  (PRN):  acetaminophen   Tablet .. 650 milliGRAM(s) Oral every 6 hours PRN Temp greater or equal to 38C (100.4F), Mild Pain (1 - 3)  artificial  tears Solution 1 Drop(s) Both EYES every 2 hours PRN Dry Eyes  lidocaine   Patch 1 Patch Transdermal daily PRN pain  magnesium hydroxide Suspension 30 milliLiter(s) Oral daily PRN Constipation  oxyCODONE    IR 10 milliGRAM(s) Oral three times a day PRN Severe Pain (7 - 10)    Allergies  No Known Allergies    FAMILY HISTORY:  No pertinent family history in first degree relatives: pt does not recall family history of medical problems in mother or father, both     REVIEW OF SYSTEMS:  CONSTITUTIONAL: No weakness, fevers or chills  RESPIRATORY: No cough, wheezing, hemoptysis; No shortness of breath  CARDIOVASCULAR: No chest pain or palpitations  GASTROINTESTINAL: Per HPI.     Vital Signs Last 24 Hrs  T(C): 36.8 (30 Oct 2019 05:00), Max: 37.3 (29 Oct 2019 09:16)  T(F): 98.3 (30 Oct 2019 05:00), Max: 99.2 (29 Oct 2019 09:16)  HR: 77 (30 Oct 2019 06:00) (77 - 118)  BP: 129/68 (30 Oct 2019 06:00) (111/61 - 140/60)  BP(mean): 86 (30 Oct 2019 06:00) (73 - 89)  RR: 13 (30 Oct 2019 06:00) (11 - 19)  SpO2: 96% (30 Oct 2019 06:00) (96% - 100%)    PHYSICAL EXAM:  Constitutional: Middle aged female with multiple medical issues, paraplegic   Respiratory: CTAB  Cardiovascular: S1 and S2, RRR, no M/G/R  Gastrointestinal: BS+, soft, NT/ND, pain pump.     LABS:                        7.2    8.92  )-----------( 271      ( 30 Oct 2019 02:53 )             21.7     10-30    143  |  110<H>  |  26<H>  ----------------------------<  99  4.0   |  27  |  0.47<L>    Ca    8.3<L>      30 Oct 2019 06:50  Phos  2.7     10-30  Mg     2.1     10-30    TPro  5.2<L>  /  Alb  2.1<L>  /  TBili  0.7  /  DBili  x   /  AST  18  /  ALT  19  /  AlkPhos  67  10-29    PT/INR - ( 29 Oct 2019 06:22 )   PT: 11.7 sec;   INR: 1.05 ratio         PTT - ( 29 Oct 2019 06:22 )  PTT:25.1 sec  LIVER FUNCTIONS - ( 29 Oct 2019 06:22 )  Alb: 2.1 g/dL / Pro: 5.2 gm/dL / ALK PHOS: 67 U/L / ALT: 19 U/L / AST: 18 U/L / GGT: x             RADIOLOGY & ADDITIONAL STUDIES:

## 2019-10-30 NOTE — PROGRESS NOTE ADULT - ASSESSMENT
Pt is a 67 yo Female with a PMH/o aortic dissection s/p multiple repairs, spinal hematoma resulting in paraplegia (on baclofen pump), CVA, recurrent UTIs, HTN, PAD, and HSV endophthalmitis/keratitis s/p left corneal transplant who presents with lethargy, weakness, malaise and found with an HB 5.2.    #Symptomatic anemia  - Mostly due to GI bleeding in setting of presumed Deiulafoy lesion in the past. Pt with recent GI w/u with EGD , capsule w/o source of bleeding  - Admission Hb 5.2  - s/p 4 PRBC transfusion since admission with an Hb 7.7.   - Last PRBC today afternoon due to Hb 6.8 	   - GI consult appreciated  - Schedule for Colonoscopy and endoscopy on 10/31/19  - NPO after midnight   - Continue monitoring H&H every 8 hrs  - Monitor for bleeding  - Monitor vital signs   - Continue Protonix 40 mg IV BID     # Stercoral proctitis  - CT abdomen remarkable for large amount of stool in the colon with rectal wall thickening and adjacent perirectal infiltrative changes compatible with rectal impaction due to possible stercoral proctitis.   - ID consulted appreciated     # UTI   - Past Hx of ESBL Klebsiella infection  - ID consulted appreciated   - Continue in contact isolation  - F/U urine cultures   - Start Meropenem 1 g every 8 hrs     #nephrolithiasis/retained stent  - CT remarkable for mild left hydronephrosis, left collecting system stent and calculus noted within the left distal ureter.    - L stent placement by Dr Lares 8/12/19 for a 1cm mid ureteral left stone.  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     #Paraplegia  - Due to spinal hematoma  - Reposition every 3 hr  - Continue pain management with baclofen    # Hypertension  - Continue with Labetalol     Case discussed with Dr Almeida

## 2019-10-31 NOTE — PROGRESS NOTE ADULT - SUBJECTIVE AND OBJECTIVE BOX
HPI:  Pt is a 69 yo Female with a PMH/o aortic dissection s/p multiple repairs, spinal hematoma resulting in paraplegia (on baclofen pump), CVA, nephrolithiasis with retained stent (did not f/u to remove as per pt s/p d/c home), recurrent UTIs, HTN, PAD, and HSV endophthalmitis/keratitis s/p left corneal transplant who presents with lethargy, weakness, malaise. 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 Today pt received call about abnormal labs with hgb of 4. Pt denies any bright red bleeding per rectum, vomiting, diarrhea, nausea, abdominal pain, but does endorse dark stool. Of note, Pt has been hospitalized multiple times with endoscopies and capsule endoscopies, and CTA's which have all been without source of bleeding. In past pt episodes complicated by hypotension and BRBPR while inpatient/on admission.     SUBJECTIVE:    Pt is evaluated bedside and found AAOx3, hemodynamically stable and in no acute distress. Pt received one PRBC yesterday due to Hb 6.9 achieving an improvement to 8.1. She complaint of pain in the buttock area. Per nurse the buttock area is irritated due to frequent bowel movements. Last bowel movement less darker that previous days. Pt is schedule for endoscopy and colonoscopy  today afternoon. Denies fever, abdominal pain, SOB, chest pain and any other symptoms.       Review of Symptoms  Gen: no fevers, chills, sweats, weight loss, fatigue  Visual: no recent changes in vision, no blurriness, no seeing spots  Cardiovascular: no chest pain, no palpitations, no orthopnea, no leg swelling  Respiratory: no shortness of breath, no exertional dyspnea, no cough, no rhinorrhea, no nasal congestion  GI:+ Melena. no difficulty swallowing, no nausea, no vomiting, no abdominal pain, no diarrhea, no constipation  : no dysuria, no increased freq, no hematuria, no malodorous urine  Derm: no wounds, no rashes  Heme: no easy bleeding or bruising  MSK: + back pain, buttock pain.  No joint pain, no joint swelling or redness, no extremity pain   Neuro: + paraplegia. No headache, no numbness, no weakness, no memory loss  Psych: no depression, no anxiety, no SI      MEDICATIONS  (STANDING):  atorvastatin 40 milliGRAM(s) Oral at bedtime  baclofen 20 milliGRAM(s) Oral two times a day  DULoxetine 30 milliGRAM(s) Oral two times a day  gabapentin 800 milliGRAM(s) Oral three times a day  labetalol 200 milliGRAM(s) Oral two times a day  meropenem  IVPB 1000 milliGRAM(s) IV Intermittent every 8 hours  ofloxacin 0.3% Solution 1 Drop(s) Both EYES four times a day  pantoprazole  Injectable 40 milliGRAM(s) IV Push two times a day  potassium chloride  10 mEq/100 mL IVPB 10 milliEquivalent(s) IV Intermittent every 1 hour  prednisoLONE acetate 1% Suspension 1 Drop(s) Both EYES four times a day  senna 2 Tablet(s) Oral at bedtime  sodium chloride 0.9%. 1000 milliLiter(s) (100 mL/Hr) IV Continuous <Continuous>  valACYclovir 1000 milliGRAM(s) Oral three times a day    MEDICATIONS  (PRN):  acetaminophen   Tablet .. 650 milliGRAM(s) Oral every 6 hours PRN Temp greater or equal to 38C (100.4F), Mild Pain (1 - 3)  artificial  tears Solution 1 Drop(s) Both EYES every 2 hours PRN Dry Eyes  lidocaine   Patch 1 Patch Transdermal daily PRN pain  magnesium hydroxide Suspension 30 milliLiter(s) Oral daily PRN Constipation  oxyCODONE    IR 10 milliGRAM(s) Oral three times a day PRN Severe Pain (7 - 10)      Vital Signs Last 24 Hrs  T(C): 36.4 (31 Oct 2019 09:52), Max: 36.8 (31 Oct 2019 05:00)  T(F): 97.6 (31 Oct 2019 09:52), Max: 98.2 (31 Oct 2019 05:00)  HR: 74 (31 Oct 2019 14:00) (64 - 86)  BP: 149/69 (31 Oct 2019 14:00) (105/54 - 149/69)  BP(mean): 93 (31 Oct 2019 14:00) (69 - 132)  RR: 7 (31 Oct 2019 14:00) (7 - 16)  SpO2: 99% (31 Oct 2019 16:00) (85% - 99%)    GEN: NAD, comfortable, resting in bed  HEENT: NC/AT, EOMI, PERRLA, MMM, pale conjunctiva,  normal hearing, no nasal discharge, throat clear, no thrush, normal dentition.   NECK: supple, no JVD, no LAD, no thyromegaly  BACK:  ROM intact, no spinal/paraspinal tenderness  CV: S1S2, RRR, no mumur  RESP: good air movement, CTABL, no rales, rhonchi or wheezing, respirations unlabored  ABD: +BS, soft, ND, NT, no guarding, no rigidity, no HSM  EXT: +2 radial and pedal pulses, no edema, no calve tenderness  SKIN: No visible Rashes or Ulcers  NEURO: paraplegic, decrease motor strength in lower extremities.   PSYCH: normal behavior       LABS:                          8.2    6.10  )-----------( 255      ( 31 Oct 2019 11:53 )             26.1     31 Oct 2019 06:45    141    |  111    |  10     ----------------------------<  89     3.4     |  25     |  0.31     Ca    8.3        31 Oct 2019 06:45  Phos  2.7       30 Oct 2019 06:50  Mg     2.1       30 Oct 2019 06:50          CAPILLARY BLOOD GLUCOSE                RADIOLOGY: HPI:  Pt is a 67 yo Female with a PMH/o aortic dissection s/p multiple repairs, spinal hematoma resulting in paraplegia (on baclofen pump), CVA, nephrolithiasis with retained stent (did not f/u to remove as per pt s/p d/c home), recurrent UTIs, HTN, PAD, and HSV endophthalmitis/keratitis s/p left corneal transplant who presents with lethargy, weakness, malaise. 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 Today pt received call about abnormal labs with hgb of 4. Pt denies any bright red bleeding per rectum, vomiting, diarrhea, nausea, abdominal pain, but does endorse dark stool. Of note, Pt has been hospitalized multiple times with endoscopies and capsule endoscopies, and CTA's which have all been without source of bleeding. In past pt episodes complicated by hypotension and BRBPR while inpatient/on admission.     SUBJECTIVE:    Pt is evaluated bedside and found AAOx3, hemodynamically stable and in no acute distress. Pt received one PRBC yesterday due to Hb 6.9 achieving an improvement to 8.1. She complaint of pain in the buttock area. Per nurse the buttock area is irritated due to frequent bowel movements. Last bowel movement less darker that previous days. Denies fever, abdominal pain, SOB, chest pain and any other symptoms.       Review of Symptoms  Gen: no fevers, chills, sweats, weight loss, fatigue  Visual: no recent changes in vision, no blurriness, no seeing spots  Cardiovascular: no chest pain, no palpitations, no orthopnea, no leg swelling  Respiratory: no shortness of breath, no exertional dyspnea, no cough, no rhinorrhea, no nasal congestion  GI:+ Melena. no difficulty swallowing, no nausea, no vomiting, no abdominal pain, no diarrhea, no constipation  : no dysuria, no increased freq, no hematuria, no malodorous urine  Derm: no wounds, no rashes  Heme: no easy bleeding or bruising  MSK: + back pain, buttock pain.  No joint pain, no joint swelling or redness, no extremity pain   Neuro: + paraplegia. No headache, no numbness, no weakness, no memory loss  Psych: no depression, no anxiety, no SI      MEDICATIONS  (STANDING):  atorvastatin 40 milliGRAM(s) Oral at bedtime  baclofen 20 milliGRAM(s) Oral two times a day  DULoxetine 30 milliGRAM(s) Oral two times a day  gabapentin 800 milliGRAM(s) Oral three times a day  labetalol 200 milliGRAM(s) Oral two times a day  meropenem  IVPB 1000 milliGRAM(s) IV Intermittent every 8 hours  ofloxacin 0.3% Solution 1 Drop(s) Both EYES four times a day  pantoprazole  Injectable 40 milliGRAM(s) IV Push two times a day  potassium chloride  10 mEq/100 mL IVPB 10 milliEquivalent(s) IV Intermittent every 1 hour  prednisoLONE acetate 1% Suspension 1 Drop(s) Both EYES four times a day  senna 2 Tablet(s) Oral at bedtime  sodium chloride 0.9%. 1000 milliLiter(s) (100 mL/Hr) IV Continuous <Continuous>  valACYclovir 1000 milliGRAM(s) Oral three times a day    MEDICATIONS  (PRN):  acetaminophen   Tablet .. 650 milliGRAM(s) Oral every 6 hours PRN Temp greater or equal to 38C (100.4F), Mild Pain (1 - 3)  artificial  tears Solution 1 Drop(s) Both EYES every 2 hours PRN Dry Eyes  lidocaine   Patch 1 Patch Transdermal daily PRN pain  magnesium hydroxide Suspension 30 milliLiter(s) Oral daily PRN Constipation  oxyCODONE    IR 10 milliGRAM(s) Oral three times a day PRN Severe Pain (7 - 10)      Vital Signs Last 24 Hrs  T(C): 36.4 (31 Oct 2019 09:52), Max: 36.8 (31 Oct 2019 05:00)  T(F): 97.6 (31 Oct 2019 09:52), Max: 98.2 (31 Oct 2019 05:00)  HR: 74 (31 Oct 2019 14:00) (64 - 86)  BP: 149/69 (31 Oct 2019 14:00) (105/54 - 149/69)  BP(mean): 93 (31 Oct 2019 14:00) (69 - 132)  RR: 7 (31 Oct 2019 14:00) (7 - 16)  SpO2: 99% (31 Oct 2019 16:00) (85% - 99%)    GEN: NAD, comfortable, resting in bed  HEENT: NC/AT, EOMI, PERRLA, MMM, pale conjunctiva,  normal hearing, no nasal discharge, throat clear, no thrush, normal dentition.   NECK: supple, no JVD, no LAD, no thyromegaly  BACK:  ROM intact, no spinal/paraspinal tenderness  CV: S1S2, RRR, no mumur  RESP: good air movement, CTABL, no rales, rhonchi or wheezing, respirations unlabored  ABD: +BS, soft, ND, NT, no guarding, no rigidity, no HSM  EXT: +2 radial and pedal pulses, no edema, no calve tenderness  SKIN: No visible Rashes or Ulcers  NEURO: paraplegic, decrease motor strength in lower extremities.   PSYCH: normal behavior       LABS:                          8.2    6.10  )-----------( 255      ( 31 Oct 2019 11:53 )             26.1     31 Oct 2019 06:45    141    |  111    |  10     ----------------------------<  89     3.4     |  25     |  0.31     Ca    8.3        31 Oct 2019 06:45  Phos  2.7       30 Oct 2019 06:50  Mg     2.1       30 Oct 2019 06:50          CAPILLARY BLOOD GLUCOSE                RADIOLOGY:

## 2019-10-31 NOTE — BRIEF OPERATIVE NOTE - OPERATION/FINDINGS
1) Long segment of celiac artery occlusion; celiac branch arteries secondarily supplied by pancreaticoduodenal arcade collaterals  2) Inability to cross celiac occlusion despite exhaustive efforts  3) Inability to study right or left gastric arteries despite exhaustive efforts due to celiac occlusion and collateral tortuosity  4) R CFA closed with 5F Mynx

## 2019-10-31 NOTE — PROGRESS NOTE ADULT - RS GEN PE MLT RESP DETAILS PC
There are no preventive care reminders to display for this patient.    Patient is up to date, no discussion needed.    Over the last 2 weeks, how often have you been bothered by the following problems?          PHQ2 Score:  0  1. Little interest or pleasure in doing things?:  0  2. Feeling down, depressed, or hopeless?:  0                    breath sounds equal/respirations non-labored/good air movement

## 2019-10-31 NOTE — CONSULT NOTE ADULT - ASSESSMENT
Recurrent upper GI bleed, which has never been clearly localized endoscopically despite multiple attempts. Possibly Dieulafoy lesion in fundus. Also to be considered is left side portal hypertension in view of dilated varices at splenic hilum.  Currently hemodynamically stable. Hct holding. NGT aspirate with old dark blood. Vitals stable. Monitor closely, protonix drip, serial H/H. Consider bleeding scan for localization if Hct drops. Surgery if bleeding uncontrollable with non operative means.

## 2019-10-31 NOTE — PROGRESS NOTE ADULT - ASSESSMENT
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 The pt received call about abnormal labs with hgb of 4. Pt denies any bright red bleeding per rectum, vomiting, diarrhea, nausea, abdominal pain, but does endorse dark stool. In ER he ws noted with low grade fever. He received zosyn.     1. Low grade fever improving. Pyuria. Kidney stone. Left ureteral stent. Probable UTI. h/o prior UTI with ESBL E.coli. Severe anemia.   -improving  -BC x 2, urine c/s reviewed  -on meropenem 1 gm IV q8h # 2  -tolerating abx well so far; no side effects noted  -contact isolation  -continue abx coverage  -consider urology evaluation  -monitor temps  -f/u CBC  -supportive care  2. Other issues:   -care per medicine

## 2019-10-31 NOTE — CONSULT NOTE ADULT - SUBJECTIVE AND OBJECTIVE BOX
UROLOGY CONSULTATION    BRENT GRICEL  [id number]    CC    HPI  Patient is a 68y yo Female who is admitted for Anemia      Urology consult requested for retained left ureteral stent and ureterolithiasis  Patient underwent emergent cysto L stent placement by my former partner Dr Lares 8/12/19 for a 1cm mid ureteral left stone.     Multiple hospitalizations for recurrent GI bleeding s/p multiple endoscopic interventions.  Recently seen by author and was supposed to be schedule for outpatient Left ESWL however she bounced back to the hospital with another episode of acute blood loss anemia from GI bleed    Patient seen and examined at bedside.   She is scheduled for Endoscopy later today    Denies dysuria, flank pain, gross hematuria, fevers, chills, nausea, vomiting or weight loss    ROS  12 point ROS negative except that outlined in HPI      PMHX  Anemia  No pertinent family history in first degree relatives  Family history of Alzheimer's disease  No pertinent family history in first degree relatives  Melena  Gastrointestinal hemorrhage  Dieulafoy lesion of stomach or duodenum  Subdural hematoma, nontraumatic  Dorsalgia of lumbar region  Self-catheterizes urinary bladder  Anemia  Uveitis  Osteoporosis  PAD (peripheral artery disease)  Hematoma  Paraplegia  Aortic dissection, abdominal  Aortic dissection, thoracic  Blindness of left eye  Chronic constipation  Subdural hematoma  UTI (urinary tract infection)  TIA (transient ischemic attack)  Aortic Dissection, Abdominal  HTN (Hypertension)  History of corneal transplant  Disorder of spine  Presence of IVC filter  S/P aortic dissection repair  H/O Spinal surgery  Aneurysm Repair, Abdominal Aortic        MEDS  acetaminophen   Tablet .. 650 milliGRAM(s) Oral every 6 hours PRN  artificial  tears Solution 1 Drop(s) Both EYES every 2 hours PRN  atorvastatin 40 milliGRAM(s) Oral at bedtime  baclofen 20 milliGRAM(s) Oral two times a day  DULoxetine 30 milliGRAM(s) Oral two times a day  gabapentin 800 milliGRAM(s) Oral three times a day  labetalol 200 milliGRAM(s) Oral two times a day  lidocaine   Patch 1 Patch Transdermal daily PRN  magnesium hydroxide Suspension 30 milliLiter(s) Oral daily PRN  meropenem  IVPB 1000 milliGRAM(s) IV Intermittent every 8 hours  ofloxacin 0.3% Solution 1 Drop(s) Both EYES four times a day  oxyCODONE    IR 10 milliGRAM(s) Oral three times a day PRN  pantoprazole  Injectable 40 milliGRAM(s) IV Push two times a day  potassium chloride  10 mEq/100 mL IVPB 10 milliEquivalent(s) IV Intermittent every 1 hour  prednisoLONE acetate 1% Suspension 1 Drop(s) Both EYES four times a day  senna 2 Tablet(s) Oral at bedtime  sodium chloride 0.9%. 1000 milliLiter(s) IV Continuous <Continuous>  valACYclovir 1000 milliGRAM(s) Oral three times a day      Allergies  No Known Allergies        VITALS  T(C): 36.4 (10-31-19 @ 09:52), Max: 36.8 (10-31-19 @ 05:00)  HR: 64 (10-31-19 @ 13:00)  BP: 114/57 (10-31-19 @ 13:00)  RR: 11 (10-31-19 @ 13:00)  SpO2: 93% (10-31-19 @ 13:00)    10-30-19 @ 07:01  -  10-31-19 @ 07:00  --------------------------------------------------------  IN: 0 mL / OUT: 1502 mL / NET: -1502 mL    10-31-19 @ 07:01  -  10-31-19 @ 13:54  --------------------------------------------------------  IN: 0 mL / OUT: 400 mL / NET: -400 mL        Gen: *** Female in NAD, ***well nourished  HEENT: normocephalic, anicteric sclera, moist mucus membranes; hearing intact  Chest: non labored breathing  Abd: soft, NT, ND, no masses  : no suprapubic distension or tenderness  Psych: AAOx3, normal affect & mood  Ext: moves all four extremities freely, no peripheral edema    LABS  10-31    141  |  111<H>  |  10  ----------------------------<  89  3.4<L>   |  25  |  0.31<L>    Ca    8.3<L>      31 Oct 2019 06:45  Phos  2.7     10-30  Mg     2.1     10-30      CBC Full  -  ( 31 Oct 2019 11:53 )  WBC Count : 6.10 K/uL  Hemoglobin : 8.2 g/dL  Hematocrit : 26.1 %  Platelet Count - Automated : 255 K/uL  Mean Cell Volume : 91.6 fl  Mean Cell Hemoglobin : 28.8 pg  Mean Cell Hemoglobin Concentration : 31.4 gm/dL  Auto Neutrophil # : x  Auto Lymphocyte # : x  Auto Monocyte # : x  Auto Eosinophil # : x  Auto Basophil # : x  Auto Neutrophil % : x  Auto Lymphocyte % : x  Auto Monocyte % : x  Auto Eosinophil % : x  Auto Basophil % : x      10/23/19 Ucx: >3 organisms        RADIOLOGY  10/22/19 CT A/p: 1cm left mid ureteral stone, retained ureteral stent, mild hydro  10/20/19 KUB: stone not well visualized  9/28/19 KUB: 1cm stone well visualized mid ureter     ASSESSMENT & PLAN    Retained stent  Ureterolithiasis  - I discussed with the patient the treatment options for her obstructing ureteral calculus:   1. Stent removal with attempt at spontaneous passage. During this time period they may experience pain, infection and hematuria. The patient understands that they must follow up in the office and undergo repeat imaging to ensure the stone has passed. Lack of pain does not mean the stone has passed definitively. Do not recommend this due to large stone size  2. ESWL which could be done if the stone is visible on xray and is done as an outpatient usually.  3. Ureteroscopy with laser lithotripsy    Pros, cons, stone clearance rates and risks of each were reviewed in detail   At the end of our discussion patient has agreed to undergo ESWL and left stent change  Will attempt to place patient on OR schedule for next THurs or Fri 11/7 or 11/8      The patient understands that the stent is not permanent and needs to be removed within a few months at the latest. THis is done as an outpatient. I strongly encouraged her to follow up as soon as she leaves the hospital since her treatment has been already delayed by several months    Please provide medical clearance for general anesthesia  Please obtain ANOTHER straight cath urine specimen for culture in preparation for surgical intervention now that she is receiving IV meropenem         Thank you for allowing me to participate in the care of this patient  Please call if there are questions or concerns     I explained the various issues and complexities regarding their current urological condition(s) and treatment options. They verbalized understanding and I answered all of their questions to satisfaction.     Flory Dixon MD  [facility]  [phone]    10-31-19 @ 13:54

## 2019-10-31 NOTE — PROGRESS NOTE ADULT - SUBJECTIVE AND OBJECTIVE BOX
Date of service: 10-31-19 @ 12:42    Lying in bed in NAD  Weak looking  Has dysuria    ROS: no fever or chills; denies dizziness, no HA, no SOB or cough, no abdominal pain, no diarrhea or constipation; no legs pain, no rashes    MEDICATIONS  (STANDING):  atorvastatin 40 milliGRAM(s) Oral at bedtime  baclofen 20 milliGRAM(s) Oral two times a day  DULoxetine 30 milliGRAM(s) Oral two times a day  gabapentin 800 milliGRAM(s) Oral three times a day  labetalol 200 milliGRAM(s) Oral two times a day  meropenem  IVPB 1000 milliGRAM(s) IV Intermittent every 8 hours  ofloxacin 0.3% Solution 1 Drop(s) Both EYES four times a day  pantoprazole  Injectable 40 milliGRAM(s) IV Push two times a day  potassium chloride  10 mEq/100 mL IVPB 10 milliEquivalent(s) IV Intermittent every 1 hour  prednisoLONE acetate 1% Suspension 1 Drop(s) Both EYES four times a day  senna 2 Tablet(s) Oral at bedtime  sodium chloride 0.9%. 1000 milliLiter(s) (100 mL/Hr) IV Continuous <Continuous>  valACYclovir 1000 milliGRAM(s) Oral three times a day      Vital Signs Last 24 Hrs  T(C): 36.4 (31 Oct 2019 09:52), Max: 36.8 (31 Oct 2019 05:00)  T(F): 97.6 (31 Oct 2019 09:52), Max: 98.2 (31 Oct 2019 05:00)  HR: 72 (31 Oct 2019 12:00) (66 - 86)  BP: 125/65 (31 Oct 2019 12:00) (105/54 - 146/68)  BP(mean): 83 (31 Oct 2019 12:00) (69 - 132)  RR: 16 (31 Oct 2019 12:00) (8 - 16)  SpO2: 99% (31 Oct 2019 12:00) (85% - 99%)    Physical Exam:      Constitutional: frail looking  HEENT: NC/AT, EOMI, PERRLA, conjunctivae clear  Neck: supple; thyroid not palpable  Back: no tenderness  Respiratory: respiratory effort normal; clear to auscultation  Cardiovascular: S1S2 regular, no murmurs  Abdomen: soft, not tender, not distended, positive BS  Genitourinary: no suprapubic tenderness  Lymphatic: no LN palpable  Musculoskeletal: no muscle tenderness, no joint swelling or tenderness  Extremities: no pedal edema  Neurological/ Psychiatric: AxOx3, moving all extremities  Skin: no rashes; no palpable lesions    Labs: reviewed                        8.2    6.10  )-----------( 255      ( 31 Oct 2019 11:53 )             26.1     10-31    141  |  111<H>  |  10  ----------------------------<  89  3.4<L>   |  25  |  0.31<L>    Ca    8.3<L>      31 Oct 2019 06:45  Phos  2.7     10-30  Mg     2.1     10-30                        6.9    7.02  )-----------( 225      ( 30 Oct 2019 11:06 )             21.5     10-30    143  |  110<H>  |  26<H>  ----------------------------<  99  4.0   |  27  |  0.47<L>    Ca    8.3<L>      30 Oct 2019 06:50  Phos  2.7     10-30  Mg     2.1     10-30    TPro  5.2<L>  /  Alb  2.1<L>  /  TBili  0.7  /  DBili  x   /  AST  18  /  ALT  19  /  AlkPhos  67  10-29     LIVER FUNCTIONS - ( 29 Oct 2019 06:22 )  Alb: 2.1 g/dL / Pro: 5.2 gm/dL / ALK PHOS: 67 U/L / ALT: 19 U/L / AST: 18 U/L / GGT: x           Urinalysis Basic - ( 29 Oct 2019 05:45 )    Color: Yellow / Appearance: Clear / S.010 / pH: x  Gluc: x / Ketone: Negative  / Bili: Negative / Urobili: Negative mg/dL   Blood: x / Protein: 30 mg/dL / Nitrite: Positive   Leuk Esterase: Moderate / RBC: 3-5 /HPF / WBC >50   Sq Epi: x / Non Sq Epi: Few / Bacteria: TNTC      Culture - Urine (collected 23 Oct 2019 16:30)  Source: .Urine Catheterized  Final Report (25 Oct 2019 06:45):    >=3 organisms. Probable collection contamination.    Radiology: all available radiological tests reviewed    < from: CT Abdomen and Pelvis w/ IV Cont (10.28.19 @ 21:47) >  1. Infrarenal abdominal aortic dissection is again noted and is not   significantly changed.   2. Mild left hydronephrosis. Left renal collecting system stent. 6 x 9   mm, calculus noted within the left distal ureter, without significant   change. Tiny   left renal calculus.   3. Large amount of stool in the colon with rectal wall thickening and   adjacent perirectal infiltrative changes compatible with rectal   impaction, clinically correlate to exclude stercoral proctitis.    < end of copied text >      Advanced directives addressed: full resuscitation

## 2019-10-31 NOTE — PROGRESS NOTE ADULT - SUBJECTIVE AND OBJECTIVE BOX
pt seen in CCU at 20:00 post-IR angiography and attempted intervention.  D/w Dr Leiva - unable to access appropriate vessels as noted.  Pt remains intubated, sedated.  Hemodynamics reasonable s/p 2u PRBC and OFF vasopressors.  Repeat Hgb 8.5  pt had endoscopy earlier in the day but source of bleeding could not be localized due to significant blood in the stomach.  pt was intubated at that time.  Surgery also consulted and came to see patient this evening.  pt known to me from previous admissions to  also with GI bleeding.    per records, pt is 69 yo F admitted 10/28 - h/o recurrent GI bleeds, thought to be due to a Dieulafoy's lesion, and was discharged 10/25 with a hgb 9.  On day of admission pt rec'd call from lab - hgb of 4.       PMH/o aortic dissection s/p multiple repairs, spinal hematoma resulting in paraplegia (on baclofen pump), CVA, nephrolithiasis with retained stent (did not f/u to remove as per pt s/p d/c home), recurrent UTIs, HTN, PAD, and HSV endophthalmitis/keratitis s/p left corneal transplant     PAST MEDICAL & SURGICAL HISTORY:  Melena: 10/7/2019  Gastrointestinal hemorrhage: 9/28/2019, 9/4/2019, 8/29/2019  Dieulafoy lesion of stomach or duodenum: 10/1/2019  Subdural hematoma, nontraumatic: spontaneous  Dorsalgia of lumbar region: on pain medication /baclofen po and pump  Self-catheterizes urinary bladder  Anemia: chronic  Uveitis  Osteoporosis  PAD (peripheral artery disease)  Hematoma: spinal treated September 2018  Paraplegia: due to spinal hematoma, on wheelchair goes to physical therapy 2 x weekly  Aortic dissection, thoracic: Type A Repaired 2009  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 2018  UTI (urinary tract infection): recurrent  TIA (transient ischemic attack)  HTN (Hypertension)  History of corneal transplant: left corneal transplant on 5/21/2018  Disorder of spine: unthetethering 2 x  Presence of IVC filter: 2014 ?  S/P aortic dissection repair: Type A Dissection repair /2009   descending aortic aneurysm repair 9/2016  H/O Spinal surgery: laminectomies 2014      Allergies    No Known Allergies    ICU Vital Signs Last 24 Hrs  T(C): 36.1 (31 Oct 2019 19:20), Max: 36.8 (31 Oct 2019 05:00)  T(F): 97 (31 Oct 2019 19:20), Max: 98.2 (31 Oct 2019 05:00)  HR: 92 (31 Oct 2019 20:00) (64 - 102)  BP: 161/86 (31 Oct 2019 20:00) (105/54 - 167/93)  BP(mean): 90 (31 Oct 2019 15:00) (69 - 132)  RR: 10 (31 Oct 2019 20:00) (7 - 18)  SpO2: 100% (31 Oct 2019 20:00) (93% - 100%)      Mode: AC/ CMV (Assist Control/ Continuous Mandatory Ventilation)  RR (machine): 10  TV (machine): 450  FiO2: 40  PEEP: 5  PIP: 21      I&O's Summary    30 Oct 2019 07:01  -  31 Oct 2019 07:00  --------------------------------------------------------  IN: 338 mL / OUT: 2102 mL / NET: -1764 mL    31 Oct 2019 07:01  -  31 Oct 2019 23:33  --------------------------------------------------------  IN: 2700 mL / OUT: 1000 mL / NET: 1700 mL                              8.5    x     )-----------( x        ( 31 Oct 2019 21:14 )             x          10-31    141  |  111<H>  |  7   ----------------------------<  101<H>  4.1   |  25  |  0.40<L>    Ca    7.8<L>      31 Oct 2019 21:14  Phos  3.5     10-31  Mg     1.9     10-31      MEDICATIONS  (STANDING):  atorvastatin 40 milliGRAM(s) Oral at bedtime  baclofen 20 milliGRAM(s) Oral two times a day  DULoxetine 30 milliGRAM(s) Oral two times a day  gabapentin 800 milliGRAM(s) Oral three times a day  labetalol 200 milliGRAM(s) Oral two times a day  meropenem  IVPB 1000 milliGRAM(s) IV Intermittent every 8 hours  ofloxacin 0.3% Solution 1 Drop(s) Both EYES four times a day  pantoprazole Infusion 8 mG/Hr (10 mL/Hr) IV Continuous <Continuous>  prednisoLONE acetate 1% Suspension 1 Drop(s) Both EYES four times a day  propofol Infusion 5 MICROgram(s)/kG/Min (1.524 mL/Hr) IV Continuous <Continuous>  senna 2 Tablet(s) Oral at bedtime  sodium chloride 0.9%. 1000 milliLiter(s) (100 mL/Hr) IV Continuous <Continuous>  valACYclovir 1000 milliGRAM(s) Oral three times a day    MEDICATIONS  (PRN):  acetaminophen   Tablet .. 650 milliGRAM(s) Oral every 6 hours PRN Temp greater or equal to 38C (100.4F), Mild Pain (1 - 3)  artificial  tears Solution 1 Drop(s) Both EYES every 2 hours PRN Dry Eyes  lidocaine   Patch 1 Patch Transdermal daily PRN pain  magnesium hydroxide Suspension 30 milliLiter(s) Oral daily PRN Constipation  oxyCODONE    IR 10 milliGRAM(s) Oral three times a day PRN Severe Pain (7 - 10)          DVT Prophylaxis:  Chemoprophylaxis contraindicated, venodynes    Visit Information:  Critical care time 45 min.    ** Time is exclusive of billed procedures and/or teaching and/or routine family updates.

## 2019-10-31 NOTE — PROGRESS NOTE ADULT - ASSESSMENT
69 yo F admitted 10/28 - h/o recurrent GI bleeds, thought to be due to a Dieulafoy's lesion, - now with recurrent GI hemorrhage and anemia due to acute blood loss  unable to be localized or treated by GI and IR  seems to have stopped as hemodynamics and Hgb reasonable at present  acute respiratory failure requiring vent support    Plan at this time is for continued care in CCU  HOB 30  Protonix infusion  IVF  seral Hgb levels q4h  transfuse as needed  Surgical team involved in case of emergent need for intervention  possible EGD in am if bleeding has stopped in attempt to identify site/source  keep intubated/sedated for now  Supportive care    d/w IR, GI, Surgery as noted.

## 2019-10-31 NOTE — PROGRESS NOTE ADULT - ASSESSMENT
Pt is a 69 yo Female with a PMH/o aortic dissection s/p multiple repairs, spinal hematoma resulting in paraplegia (on baclofen pump), CVA, recurrent UTIs, HTN, PAD, and HSV endophthalmitis/keratitis s/p left corneal transplant who presents with lethargy, weakness, malaise and found with an HB 5.2.    #Symptomatic anemia  - Mostly due to GI bleeding in setting of presumed Deiulafoy lesion in the past. Pt with recent GI w/u with EGD , capsule w/o source of bleeding  - Admission Hb 5.2  - s/p 4 PRBC transfusion since admission  - Today Hb 8.1 	   - GI consult appreciated  - Schedule for Colonoscopy and endoscopy today afternoon  - Continue monitoring H&H every 8 hrs  - Monitor for bleeding  - Monitor vital signs   - Continue Protonix 40 mg IV BID     # Stercoral proctitis  - CT abdomen remarkable for large amount of stool in the colon with rectal wall thickening and adjacent perirectal infiltrative changes compatible with rectal impaction due to possible stercoral proctitis.   - ID consulted appreciated     # UTI   - Past Hx of ESBL Klebsiella infection  - ID consulted appreciated   - Continue in contact isolation  - F/U urine cultures   - Start Meropenem 1 g every 8 hrs  day #2     #nephrolithiasis/retained stent  - CT remarkable for mild left hydronephrosis, left collecting system stent and calculus noted within the left distal ureter.    - L stent placement by Dr Lares 8/12/19 for a 1cm mid ureteral left stone.  - Urology consult appreciated. Recommended ureteroscopy with laser lithotripsy outpatient.     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     #Paraplegia  - Due to spinal hematoma  - Reposition every 3 hr  - Continue pain management with baclofen, oxycodone and lidocaine patch     # Hypertension  - Continue with Labetalol     Case discussed with Dr Almeida Pt is a 69 yo Female with a PMH/o aortic dissection s/p multiple repairs, spinal hematoma resulting in paraplegia (on baclofen pump), CVA, recurrent UTIs, HTN, PAD, and HSV endophthalmitis/keratitis s/p left corneal transplant who presents with lethargy, weakness, malaise and found with an HB 5.2.    #Symptomatic anemia  - Mostly due to GI bleeding in setting of presumed Deiulafoy lesion in the past. Pt with recent GI w/u with EGD , capsule w/o source of bleeding  - Admission Hb 5.2  - s/p 4 PRBC transfusion since admission  - Today Hb 8.1 	   - GI consult appreciated  - Schedule for Colonoscopy and endoscopy today afternoon  - Continue monitoring H&H every 8 hrs  - Monitor for bleeding  - Monitor vital signs   - Continue Protonix 40 mg IV BID     # Stercoral proctitis  - CT abdomen remarkable for large amount of stool in the colon with rectal wall thickening and adjacent perirectal infiltrative changes compatible with rectal impaction due to possible stercoral proctitis.   - ID consulted appreciated     # UTI   - Past Hx of ESBL Klebsiella infection  - ID consulted appreciated   - Continue in contact isolation  - F/U urine cultures   - Start Meropenem 1 g every 8 hrs  day #2     #nephrolithiasis/retained stent  - CT remarkable for mild left hydronephrosis, left collecting system stent and calculus noted within the left distal ureter.    - L stent placement by Dr Lares 8/12/19 for a 1cm mid ureteral left stone.  - Urology consult appreciated. Recommended ureteroscopy with laser lithotripsy and left stent change outpatient. 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     #Paraplegia  - Due to spinal hematoma  - Reposition every 3 hr  - Continue pain management with baclofen, oxycodone and lidocaine patch     # Hypertension  - Continue with Labetalol     Case discussed with Dr Almeida Pt is a 69 yo Female with a PMH/o aortic dissection s/p multiple repairs, spinal hematoma resulting in paraplegia (on baclofen pump), CVA, recurrent UTIs, HTN, PAD, and HSV endophthalmitis/keratitis s/p left corneal transplant who presents with lethargy, weakness, malaise and found with an HB 5.2.    #Symptomatic anemia  - Mostly due to GI bleeding in setting of presumed Deiulafoy lesion in the past. Pt with recent GI w/u with EGD , capsule w/o source of bleeding  - Admission Hb 5.2  - s/p 4 PRBC transfusion since admission  - Today Hb 8.1 	   - GI consult appreciated  - Endoscopy performed today and showed red blood in the gastric fundus and gastric body  - Urgent angiogram  - f/u surgery consult and recommendation   - Continue NPO   - Continue monitoring H&H every 8 hrs  - Monitor for bleeding  - Monitor vital signs   - Continue Protonix 40 mg IV BID     # Stercoral proctitis  - CT abdomen remarkable for large amount of stool in the colon with rectal wall thickening and adjacent perirectal infiltrative changes compatible with rectal impaction due to possible stercoral proctitis.   - ID consulted appreciated     # UTI   - Past Hx of ESBL Klebsiella infection  - ID consulted appreciated   - Continue in contact isolation  - F/U urine cultures   - Start Meropenem 1 g every 8 hrs  day #2     #nephrolithiasis/retained stent  - CT remarkable for mild left hydronephrosis, left collecting system stent and calculus noted within the left distal ureter.    - L stent placement by Dr Lares 8/12/19 for a 1cm mid ureteral left stone.  - Urology consult appreciated. Recommended ureteroscopy with laser lithotripsy and left stent change outpatient. 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     #Paraplegia  - Due to spinal hematoma  - Reposition every 3 hr  - Continue pain management with baclofen, oxycodone and lidocaine patch     # Hypertension  - Continue with Labetalol     Case discussed with Dr Almeida

## 2019-10-31 NOTE — CONSULT NOTE ADULT - SUBJECTIVE AND OBJECTIVE BOX
8.5    x     )-----------( x        ( 31 Oct 2019 21:14 )             x        10-31    141  |  111<H>  |  7   ----------------------------<  101<H>  4.1   |  25  |  0.40<L>    Ca    7.8<L>      31 Oct 2019 21:14  Phos  3.5     10-31  Mg     1.9     10-31      Patient is a 68y old  Female who presents with a chief complaint of symptomatic anemia (31 Oct 2019 17:28)    HEALTH ISSUES - PROBLEM Dx:  UTI (urinary tract infection): UTI (urinary tract infection)  Paraplegia: Paraplegia  Melena: Melena  Dieulafoy lesion of stomach or duodenum: Dieulafoy lesion of stomach or duodenum  Gastrointestinal hemorrhage: Gastrointestinal hemorrhage  Anemia: Anemia        HPI:  Pt is a 69 yo Female with a PMH/o aortic dissection s/p multiple repairs, spinal hematoma resulting in paraplegia (on baclofen pump), CVA, nephrolithiasis with retained stent (did not f/u to remove as per pt s/p d/c home), recurrent UTIs, HTN, PAD, and HSV endophthalmitis/keratitis s/p left corneal transplant who presents with lethargy, weakness, malaise. 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 Today pt received call about abnormal labs with hgb of 4. Pt denies any bright red bleeding per rectum, vomiting, diarrhea, nausea, abdominal pain, but does endorse dark stool. Of note, Pt has been hospitalized multiple times with endoscopies and capsule endoscopies, and CTA's which have all been without source of bleeding. In past pt episodes complicated by hypotension and BRBPR while inpatient/on admission. (28 Oct 2019 22:12). She underwent an EGD today where she was found to have a stomach full of blood.Exact bleeding site not identified, but appeared to be proximal stomach. IR was unable to enter and occlude left gastric artery to slow down bleeding. I have been asked to evaluate her regarding this.    MEDICATIONS  (STANDING):  atorvastatin 40 milliGRAM(s) Oral at bedtime  baclofen 20 milliGRAM(s) Oral two times a day  DULoxetine 30 milliGRAM(s) Oral two times a day  gabapentin 800 milliGRAM(s) Oral three times a day  labetalol 200 milliGRAM(s) Oral two times a day  meropenem  IVPB 1000 milliGRAM(s) IV Intermittent every 8 hours  ofloxacin 0.3% Solution 1 Drop(s) Both EYES four times a day  pantoprazole Infusion 8 mG/Hr (10 mL/Hr) IV Continuous <Continuous>  potassium chloride  10 mEq/100 mL IVPB 10 milliEquivalent(s) IV Intermittent every 1 hour  prednisoLONE acetate 1% Suspension 1 Drop(s) Both EYES four times a day  propofol Infusion 5 MICROgram(s)/kG/Min (1.524 mL/Hr) IV Continuous <Continuous>  senna 2 Tablet(s) Oral at bedtime  sodium chloride 0.9%. 1000 milliLiter(s) (100 mL/Hr) IV Continuous <Continuous>  valACYclovir 1000 milliGRAM(s) Oral three times a day    Vital Signs Last 24 Hrs  T(C): 36.1 (31 Oct 2019 19:20), Max: 36.8 (31 Oct 2019 05:00)  T(F): 97 (31 Oct 2019 19:20), Max: 98.2 (31 Oct 2019 05:00)  HR: 92 (31 Oct 2019 20:00) (64 - 102)  BP: 161/86 (31 Oct 2019 20:00) (105/54 - 167/93)  BP(mean): 90 (31 Oct 2019 15:00) (69 - 132)  RR: 10 (31 Oct 2019 20:00) (7 - 18)  SpO2: 100% (31 Oct 2019 20:00) (93% - 100%)  PAST MEDICAL & SURGICAL HISTORY:  Melena: 10/7/2019  Gastrointestinal hemorrhage: 9/28/2019, 9/4/2019, 8/29/2019  Dieulafoy lesion of stomach or duodenum: 10/1/2019  Subdural hematoma, nontraumatic: spontaneous  Dorsalgia of lumbar region: on pain medication /baclofen po and pump  Self-catheterizes urinary bladder  Anemia: chronic  Uveitis  Osteoporosis  PAD (peripheral artery disease)  Hematoma: spinal treated September 2018  Paraplegia: due to spinal hematoma, on wheelchair goes to physical therapy 2 x weekly  Aortic dissection, thoracic: Type A Repaired 2009  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 2018  UTI (urinary tract infection): recurrent  TIA (transient ischemic attack)  HTN (Hypertension)  History of corneal transplant: left corneal transplant on 5/21/2018  Disorder of spine: unthetethering 2 x  Presence of IVC filter: 2014 ?  S/P aortic dissection repair: Type A Dissection repair /2009   descending aortic aneurysm repair 9/2016  H/O Spinal surgery: laminectomies 2014      PHYSICAL EXAM:      Constitutional: Intubated, sedated    HEENT: NCAT, sclerae anicteric  Neck:trachea midline, no JVD  Respiratory:clear bilat    Cardiovascular:  RRR  Gastrointestinal: + BS, non distended, soft; palpable subcutaneous pain pump    Extremities:no edema

## 2019-10-31 NOTE — PROGRESS NOTE ADULT - SUBJECTIVE AND OBJECTIVE BOX
Pt has been seen and examined with FP resident, resident supervised agree with a/p       Patient is a 68y old  Female who presents with a chief complaint of symptomatic anemia (29 Oct 2019 08:21)      PHYSICAL EXAM:  general- comfortable   -[id number],cta  -cvs-[id number] normal   -p/a-soft,bs+        A/P    #Acute anemia due to blood loss due to possible upper gi bleed   -EGD and colonoscopy today       #Supportive care

## 2019-11-01 NOTE — PROGRESS NOTE ADULT - SUBJECTIVE AND OBJECTIVE BOX
BRENT [id number]      acetaminophen   Tablet .. 650 milliGRAM(s) Oral every 6 hours PRN  artificial  tears Solution 1 Drop(s) Both EYES every 2 hours PRN  atorvastatin 40 milliGRAM(s) Oral at bedtime  baclofen 20 milliGRAM(s) Oral two times a day  DULoxetine 30 milliGRAM(s) Oral two times a day  gabapentin   Solution 800 milliGRAM(s) Oral every 8 hours  labetalol 200 milliGRAM(s) Oral two times a day  lactated ringers. 1000 milliLiter(s) IV Continuous <Continuous>  lidocaine   Patch 1 Patch Transdermal daily PRN  magnesium hydroxide Suspension 30 milliLiter(s) Oral daily PRN  meropenem  IVPB 1000 milliGRAM(s) IV Intermittent every 8 hours  ofloxacin 0.3% Solution 1 Drop(s) Both EYES four times a day  oxyCODONE    IR 10 milliGRAM(s) Oral three times a day PRN  pantoprazole Infusion 8 mG/Hr IV Continuous <Continuous>  prednisoLONE acetate 1% Suspension 1 Drop(s) Both EYES four times a day  propofol Infusion 5 MICROgram(s)/kG/Min IV Continuous <Continuous>  senna 2 Tablet(s) Oral at bedtime  valACYclovir 1000 milliGRAM(s) Oral three times a day        Physical Exam  T(C): 36.4 (11-01-19 @ 14:52), Max: 37.1 (11-01-19 @ 09:49)  HR: 81 (11-01-19 @ 15:00) (81 - 124)  BP: 157/59 (11-01-19 @ 15:00) (94/52 - 167/93)  RR: 15 (11-01-19 @ 15:00) (10 - 25)  SpO2: 100% (11-01-19 @ 15:00) (99% - 100%)  Wt(kg): --  Constitutional  Extubated, awake, alert. Some abdominal discomfort    Cor-RRR    Abd- + BS soft, non tender, no guarding.      Ext-no edema

## 2019-11-01 NOTE — PROGRESS NOTE ADULT - PROBLEM SELECTOR PROBLEM 3
Dieulafoy lesion of stomach or duodenum

## 2019-11-01 NOTE — PROGRESS NOTE ADULT - ASSESSMENT
69 y/o female with multiple episodes of recurrent gi bleeding now with melena with significant past GI Hx:  Melena: 10/7/2019  Gastrointestinal hemorrhage: 9/28/2019, 9/4/2019, 8/29/2019  Dieulafoy lesion of stomach or duodenum: 10/1/2019    Now readmitted with symptomatic anemia/dark stools, with reoccurring ugib likely due to dieulafoy requiring intubation after attempting EGD yesterday.   Trend H/H, s/p numerous blood transfusions.   Dr. Yoo speaking w/ [facility] for possible transfer as pt may require total gastrectomy?  However, she would be consider high risk and at risk for complications.   Discussed with pt, Dr. Yoo, Dr. Conway, ICU. 67 y/o female with multiple episodes of recurrent gi bleeding now with melena with significant past GI Hx:  Melena: 10/7/2019  Gastrointestinal hemorrhage: 9/28/2019, 9/4/2019, 8/29/2019  Dieulafoy lesion of stomach or duodenum: 10/1/2019    Now readmitted with symptomatic anemia/dark stools, with reoccurring ugib likely due to dieulafoy requiring intubation after attempting EGD yesterday.   Trend H/H, s/p numerous blood transfusions.   Dr. Yoo speaking w/ [place] for possible transfer as pt may require total gastrectomy?  However, she would be consider high risk and at risk for complications.   ICU management.   Discussed with pt, Dr. Yoo, Dr. Conway, ICU. 67 y/o female with multiple episodes of recurrent gi bleeding now with melena with significant past GI Hx:  Melena: 10/7/2019  Gastrointestinal hemorrhage: 9/28/2019, 9/4/2019, 8/29/2019  Dieulafoy lesion of stomach or duodenum: 10/1/2019    Now readmitted with symptomatic anemia/dark stools, with reoccurring ugib likely due to dieulafoy requiring intubation after attempting EGD yesterday.   Trend H/H, s/p numerous blood transfusions.   Dr. Yoo speaking w/ [place] for possible transfer as pt may require gastrectomy?  However, she would be consider high risk and at risk for complications.   ICU management.   Discussed with pt, Dr. Yoo, Dr. Conway, ICU.

## 2019-11-01 NOTE — PROGRESS NOTE ADULT - SUBJECTIVE AND OBJECTIVE BOX
HPI:  Pt is a 67 yo Female with a PMH/o aortic dissection s/p multiple repairs, spinal hematoma resulting in paraplegia (on baclofen pump), CVA, nephrolithiasis with retained stent (did not f/u to remove as per pt s/p d/c home), recurrent UTIs, HTN, PAD, and HSV endophthalmitis/keratitis s/p left corneal transplant who presents with lethargy, weakness, malaise. 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 Today pt received call about abnormal labs with hgb of 4. Pt denies any bright red bleeding per rectum, vomiting, diarrhea, nausea, abdominal pain, but does endorse dark stool. Of note, Pt has been hospitalized multiple times with endoscopies and capsule endoscopies, and CTA's which have all been without source of bleeding. In past pt episodes complicated by hypotension and BRBPR while inpatient/on admission.     SUBJECTIVE:  Pt is evaluated bedside and found alert and hemodynamically stable. Pt had an EGD one day ago and found with red blood in the gastric fundus and body but source of blood could not be found. Pt was intubated and IR consulted for emergent arteriogram. Arteriogram performed and showed inability to cross the celiac origin occlusion or access celiac branch vessels and no embolization performed. Pt received 1 PRBC today and now with HB 8.4. Pt was extubated and is hemodynamically stable at this moment. Denies SOB chest pain, abdominal pain or any other symptoms.     Review of Symptoms  Gen: no fevers, chills, sweats, weight loss, fatigue  Visual: no recent changes in vision, no blurriness, no seeing spots  Cardiovascular: no chest pain, no palpitations, no orthopnea, no leg swelling  Respiratory: no shortness of breath, no exertional dyspnea, no cough, no rhinorrhea, no nasal congestion  GI: + melena. no difficulty swallowing, no nausea, no vomiting, no abdominal pain, no diarrhea, no constipation  : no dysuria, no increased freq, no hematuria, no malodorous urine  Derm: no wounds, no rashes  Heme: no easy bleeding or bruising  MSK: no joint pain, no joint swelling or redness, no extremity pain   Neuro: no headache, no numbness, no weakness, no memory loss  Psych: no depression, no anxiety, no SI      MEDICATIONS  (STANDING):  atorvastatin 40 milliGRAM(s) Oral at bedtime  baclofen 20 milliGRAM(s) Oral two times a day  DULoxetine 30 milliGRAM(s) Oral two times a day  gabapentin   Solution 800 milliGRAM(s) Oral every 8 hours  labetalol 200 milliGRAM(s) Oral two times a day  lactated ringers. 1000 milliLiter(s) (100 mL/Hr) IV Continuous <Continuous>  meropenem  IVPB 1000 milliGRAM(s) IV Intermittent every 8 hours  ofloxacin 0.3% Solution 1 Drop(s) Both EYES four times a day  pantoprazole Infusion 8 mG/Hr (10 mL/Hr) IV Continuous <Continuous>  prednisoLONE acetate 1% Suspension 1 Drop(s) Both EYES four times a day  propofol Infusion 5 MICROgram(s)/kG/Min (1.524 mL/Hr) IV Continuous <Continuous>  senna 2 Tablet(s) Oral at bedtime  valACYclovir 1000 milliGRAM(s) Oral three times a day    MEDICATIONS  (PRN):  acetaminophen   Tablet .. 650 milliGRAM(s) Oral every 6 hours PRN Temp greater or equal to 38C (100.4F), Mild Pain (1 - 3)  artificial  tears Solution 1 Drop(s) Both EYES every 2 hours PRN Dry Eyes  lidocaine   Patch 1 Patch Transdermal daily PRN pain  magnesium hydroxide Suspension 30 milliLiter(s) Oral daily PRN Constipation  oxyCODONE    IR 10 milliGRAM(s) Oral three times a day PRN Severe Pain (7 - 10)      Vital Signs Last 24 Hrs  T(C): 36.4 (01 Nov 2019 14:52), Max: 37.1 (01 Nov 2019 09:49)  T(F): 97.5 (01 Nov 2019 14:52), Max: 98.7 (01 Nov 2019 09:49)  HR: 81 (01 Nov 2019 15:00) (81 - 124)  BP: 157/59 (01 Nov 2019 15:00) (94/52 - 167/93)  BP(mean): 84 (01 Nov 2019 15:00) (61 - 105)  RR: 15 (01 Nov 2019 15:00) (10 - 25)  SpO2: 100% (01 Nov 2019 15:00) (99% - 100%)      GEN: NAD, resting in bed  HEENT: NC/AT, EOMI, PERRLA, MMM, pale conjunctiva,  normal hearing, no nasal discharge, throat clear, no thrush, normal dentition.   NECK: supple, no JVD, no LAD, no thyromegaly  BACK:  ROM intact, no spinal/paraspinal tenderness  CV: S1S2, RRR, no mumur  RESP: good air movement, CTABL, no rales, rhonchi or wheezing, respirations unlabored  ABD: +BS, soft, ND, NT, no guarding, no rigidity, no HSM  EXT: +2 radial and pedal pulses, no edema, no calve tenderness  SKIN: No visible Rashes or Ulcers  NEURO: paraplegic, decrease motor strength in lower extremities.   PSYCH: normal behavior       LABS:                          8.4    x     )-----------( x        ( 01 Nov 2019 12:58 )             x        01 Nov 2019 07:13    148    |  118    |  19     ----------------------------<  98     3.4     |  25     |  0.35     Ca    7.7        01 Nov 2019 07:13  Phos  3.2       01 Nov 2019 07:13  Mg     1.9       01 Nov 2019 07:13          CAPILLARY BLOOD GLUCOSE

## 2019-11-01 NOTE — PROGRESS NOTE ADULT - ASSESSMENT
Pt is a 67 yo Female with a PMH/o aortic dissection s/p multiple repairs, spinal hematoma resulting in paraplegia (on baclofen pump), CVA, recurrent UTIs, HTN, PAD, and HSV endophthalmitis/keratitis s/p left corneal transplant who presents with lethargy, weakness, malaise and found with an HB 5.2.    #Symptomatic anemia  - Mostly due to GI bleeding in setting of presumed Deiulafoy lesion in the past. Pt with recent GI w/u with EGD , capsule w/o source of bleeding  - Admission Hb 5.2  - s/p 7 PRBC transfusion since admission  - Last PRBC today with Hb post transfusion of 8.4  - GI consult appreciated  - Endoscopy performed on 10/31/19 showed red blood in the gastric fundus and gastric body. Pt hemodynamically unstable during procedure required intubation and urgent arteriogram.   -  Angiography with inability to cross the celiac origen occlusion or acces celiac branch vessels for evaluation of gastric perfusion. No embolization performed   - Surgery consulted appreciated. No intervention at this moment.   - Possible transfer to [place] as pt may require gastrectomy?  - Continue monitoring H&H   - Monitor vital signs   - Continue protonix drip at 10 mh/hr   - Continue management under ICU care    # Stercoral proctitis  - CT abdomen remarkable for large amount of stool in the colon with rectal wall thickening and adjacent perirectal infiltrative changes compatible with rectal impaction due to possible stercoral proctitis.   - ID consulted appreciated   - Continue management under ICU care    # UTI   - Past Hx of ESBL Klebsiella infection  - ID consulted appreciated   - Continue in contact isolation  - Urine culture growth more than 3 microorganism due to contamination  - Continue Meropenem 1 g every 8 hrs  day #3  - Continue management under ICU care    #nephrolithiasis/retained stent  - CT remarkable for mild left hydronephrosis, left collecting system stent and calculus noted within the left distal ureter.    - L stent placement by Dr Lares 8/12/19 for a 1cm mid ureteral left stone.  - Urology consult appreciated. Recommended ureteroscopy with laser lithotripsy and left stent change outpatient. 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     #Paraplegia  - Due to spinal hematoma  - Reposition every 3 hr  - Continue pain management with baclofen, oxycodone and lidocaine patch     # Hypertension  - Continue with Labetalol     Case discussed with Dr Almeida

## 2019-11-01 NOTE — PROGRESS NOTE ADULT - PROBLEM SELECTOR PROBLEM 2
Gastrointestinal hemorrhage

## 2019-11-01 NOTE — PROGRESS NOTE ADULT - ASSESSMENT
67 yo F admitted 10/28 - h/o recurrent GI bleeds,   - now with recurrent GI hemorrhage and anemia due to acute blood loss  unable to be localized or treated by GI and IR  seems to have stopped as hemodynamics will trend hgb  acute respiratory failure requiring vent support, when hgb stable will wean  Patient is on meropenem for requirent uti   Protonix infusion  Surgical team involved in case of emergent need for intervention  will d/w GI plan may need transfer to tertiary facility

## 2019-11-01 NOTE — PROGRESS NOTE ADULT - SUBJECTIVE AND OBJECTIVE BOX
Date of service: 19 @ 11:53    Events noted   s/p attempted EGD; required intubation  s/p extubation this AM  Comfortable      ROS: no fever or chills; poorly verbal    MEDICATIONS  (STANDING):  atorvastatin 40 milliGRAM(s) Oral at bedtime  baclofen 20 milliGRAM(s) Oral two times a day  DULoxetine 30 milliGRAM(s) Oral two times a day  gabapentin   Solution 800 milliGRAM(s) Oral every 8 hours  labetalol 200 milliGRAM(s) Oral two times a day  lactated ringers. 1000 milliLiter(s) (100 mL/Hr) IV Continuous <Continuous>  meropenem  IVPB 1000 milliGRAM(s) IV Intermittent every 8 hours  ofloxacin 0.3% Solution 1 Drop(s) Both EYES four times a day  pantoprazole Infusion 8 mG/Hr (10 mL/Hr) IV Continuous <Continuous>  potassium chloride  10 mEq/100 mL IVPB 10 milliEquivalent(s) IV Intermittent every 1 hour  prednisoLONE acetate 1% Suspension 1 Drop(s) Both EYES four times a day  propofol Infusion 5 MICROgram(s)/kG/Min (1.524 mL/Hr) IV Continuous <Continuous>  senna 2 Tablet(s) Oral at bedtime  valACYclovir 1000 milliGRAM(s) Oral three times a day      Vital Signs Last 24 Hrs  T(C): 37.1 (2019 09:49), Max: 37.1 (2019 09:49)  T(F): 98.7 (2019 09:49), Max: 98.7 (2019 09:49)  HR: 98 (2019 07:15) (64 - 124)  BP: 97/50 (2019 07:15) (97/50 - 167/93)  BP(mean): 62 (2019 07:15) (62 - 105)  RR: 15 (2019 07:15) (7 - 25)  SpO2: 100% (2019 07:15) (93% - 100%)    Physical Exam:      Constitutional: frail looking  HEENT: NC/AT, EOMI, PERRLA, conjunctivae clear  Neck: supple; thyroid not palpable  Back: no tenderness  Respiratory: respiratory effort normal; decreased BS at bases  Cardiovascular: S1S2 regular, no murmurs  Abdomen: soft, not tender, not distended, positive BS  Genitourinary: no suprapubic tenderness  Lymphatic: no LN palpable  Musculoskeletal: no muscle tenderness, no joint swelling or tenderness  Extremities: no pedal edema  Neurological/ Psychiatric: AxOx2, moving all extremities  Skin: no rashes; no palpable lesions    Labs: reviewed                        8.4    10.82 )-----------( 276      ( 2019 07:13 )             26.4     11-    148<H>  |  118<H>  |  19  ----------------------------<  98  3.4<L>   |  25  |  0.35<L>    Ca    7.7<L>      2019 07:13  Phos  3.2     11-  Mg     1.9                                     6.9    7.02  )-----------( 225      ( 30 Oct 2019 11:06 )             21.5     10-30    143  |  110<H>  |  26<H>  ----------------------------<  99  4.0   |  27  |  0.47<L>    Ca    8.3<L>      30 Oct 2019 06:50  Phos  2.7     10-30  Mg     2.1     10-30    TPro  5.2<L>  /  Alb  2.1<L>  /  TBili  0.7  /  DBili  x   /  AST  18  /  ALT  19  /  AlkPhos  67  10-29     LIVER FUNCTIONS - ( 29 Oct 2019 06:22 )  Alb: 2.1 g/dL / Pro: 5.2 gm/dL / ALK PHOS: 67 U/L / ALT: 19 U/L / AST: 18 U/L / GGT: x           Urinalysis Basic - ( 29 Oct 2019 05:45 )    Color: Yellow / Appearance: Clear / S.010 / pH: x  Gluc: x / Ketone: Negative  / Bili: Negative / Urobili: Negative mg/dL   Blood: x / Protein: 30 mg/dL / Nitrite: Positive   Leuk Esterase: Moderate / RBC: 3-5 /HPF / WBC >50   Sq Epi: x / Non Sq Epi: Few / Bacteria: TNTC      Culture - Urine (collected 23 Oct 2019 16:30)  Source: .Urine Catheterized  Final Report (25 Oct 2019 06:45):    >=3 organisms. Probable collection contamination.    Radiology: all available radiological tests reviewed    < from: CT Abdomen and Pelvis w/ IV Cont (10.28.19 @ 21:47) >  1. Infrarenal abdominal aortic dissection is again noted and is not   significantly changed.   2. Mild left hydronephrosis. Left renal collecting system stent. 6 x 9   mm, calculus noted within the left distal ureter, without significant   change. Tiny   left renal calculus.   3. Large amount of stool in the colon with rectal wall thickening and   adjacent perirectal infiltrative changes compatible with rectal   impaction, clinically correlate to exclude stercoral proctitis.    < end of copied text >      Advanced directives addressed: full resuscitation

## 2019-11-01 NOTE — PROGRESS NOTE ADULT - SUBJECTIVE AND OBJECTIVE BOX
Patient is a 68y old  Female who presents with a chief complaint of symptomatic anemia (31 Oct 2019 23:33)    HPI:  Pt is a 69 yo Female with a PMH/o aortic dissection s/p multiple repairs, spinal hematoma resulting in paraplegia (on baclofen pump), CVA, nephrolithiasis with retained stent (did not f/u to remove as per pt s/p d/c home), recurrent UTIs, HTN, PAD, and HSV endophthalmitis/keratitis s/p left corneal transplant who presents with lethargy, weakness, malaise. 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 Today pt received call about abnormal labs with hgb of 4. Pt denies any bright red bleeding per rectum, vomiting, diarrhea, nausea, abdominal pain, but does endorse dark stool. Of note, Pt has been hospitalized multiple times with endoscopies and capsule endoscopies, and CTA's which have all been without source of bleeding. In past pt episodes complicated by hypotension and BRBPR while inpatient/on admission. (28 Oct 2019 22:12)    2019-  Pt intubated, unable to verbalize.  Spoke with intensivist and RN at bedside.     PAST MEDICAL & SURGICAL HISTORY:  Melena: 10/7/2019  Gastrointestinal hemorrhage: 2019, 2019, 2019  Dieulafoy lesion of stomach or duodenum: 10/1/2019  Subdural hematoma, nontraumatic: spontaneous  Dorsalgia of lumbar region: on pain medication /baclofen po and pump  Self-catheterizes urinary bladder  Anemia: chronic  Uveitis  Osteoporosis  PAD (peripheral artery disease)  Hematoma: spinal treated 2018  Paraplegia: due to spinal hematoma, on wheelchair goes to physical therapy 2 x weekly  Aortic dissection, thoracic: Type A Repaired   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 2018  UTI (urinary tract infection): recurrent  TIA (transient ischemic attack)  HTN (Hypertension)  History of corneal transplant: left corneal transplant on 2018  Disorder of spine: unthetethering 2 x  Presence of IVC filter:  ?  S/P aortic dissection repair: Type A Dissection repair /   descending aortic aneurysm repair 2016  H/O Spinal surgery: laminectomies     MEDICATIONS  (STANDING):  atorvastatin 40 milliGRAM(s) Oral at bedtime  baclofen 20 milliGRAM(s) Oral two times a day  DULoxetine 30 milliGRAM(s) Oral two times a day  gabapentin   Solution 800 milliGRAM(s) Oral every 8 hours  labetalol 200 milliGRAM(s) Oral two times a day  lactated ringers. 1000 milliLiter(s) (100 mL/Hr) IV Continuous <Continuous>  meropenem  IVPB 1000 milliGRAM(s) IV Intermittent every 8 hours  ofloxacin 0.3% Solution 1 Drop(s) Both EYES four times a day  pantoprazole Infusion 8 mG/Hr (10 mL/Hr) IV Continuous <Continuous>  potassium chloride  10 mEq/100 mL IVPB 10 milliEquivalent(s) IV Intermittent every 1 hour  prednisoLONE acetate 1% Suspension 1 Drop(s) Both EYES four times a day  propofol Infusion 5 MICROgram(s)/kG/Min (1.524 mL/Hr) IV Continuous <Continuous>  senna 2 Tablet(s) Oral at bedtime  valACYclovir 1000 milliGRAM(s) Oral three times a day    MEDICATIONS  (PRN):  acetaminophen   Tablet .. 650 milliGRAM(s) Oral every 6 hours PRN Temp greater or equal to 38C (100.4F), Mild Pain (1 - 3)  artificial  tears Solution 1 Drop(s) Both EYES every 2 hours PRN Dry Eyes  lidocaine   Patch 1 Patch Transdermal daily PRN pain  magnesium hydroxide Suspension 30 milliLiter(s) Oral daily PRN Constipation  oxyCODONE    IR 10 milliGRAM(s) Oral three times a day PRN Severe Pain (7 - 10)    Allergies  No Known Allergies    FAMILY HISTORY:  No pertinent family history in first degree relatives: pt does not recall family history of medical problems in mother or father, both     REVIEW OF SYSTEMS:  Unable to obtain due to above.     Vital Signs Last 24 Hrs  T(C): 37.1 (2019 09:49), Max: 37.1 (2019 09:49)  T(F): 98.7 (2019 09:49), Max: 98.7 (2019 09:49)  HR: 98 (2019 07:15) (64 - 124)  BP: 97/50 (2019 07:15) (97/50 - 167/93)  BP(mean): 62 (2019 07:15) (62 - 105)  RR: 15 (2019 07:15) (7 - 25)  SpO2: 100% (2019 07:15) (93% - 100%)    PHYSICAL EXAM:  Constitutional: Middle aged female w/ multiple medical issues, paraplegic now intubated.   Respiratory: CTAB  Cardiovascular: S1 and S2, RRR, no M/G/R  Gastrointestinal: BS+, soft, NT/ND, NT tube, pain pump.     LABS:                        8.4    10.82 )-----------( 276      ( 2019 07:13 )             26.4     11-    148<H>  |  118<H>  |  19  ----------------------------<  98  3.4<L>   |  25  |  0.35<L>    Ca    7.7<L>      2019 07:13  Phos  3.2     11-  Mg     1.9     11-            RADIOLOGY & ADDITIONAL STUDIES:

## 2019-11-01 NOTE — PROGRESS NOTE ADULT - ASSESSMENT
S/p recurrent upper GI bleed, suspected to be in proximal fundic region, possibly dieulafoy lesion. Also to be considered are varicosities involving short gastric arteries off tortuous splenic vessel. Currently no evidence of active bleed. Continue to follow closely. Surgical options include gastrotomy with control of bleeding, +/- splenectomy if bleeding appears to arise from abnormalities off short gastrics. Other possibility is total gastrectomy. Continue close monitring and supportive care.

## 2019-11-01 NOTE — PROGRESS NOTE ADULT - ASSESSMENT
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 The pt received call about abnormal labs with hgb of 4. Pt denies any bright red bleeding per rectum, vomiting, diarrhea, nausea, abdominal pain, but does endorse dark stool. In ER he ws noted with low grade fever. He received zosyn.     1. Lower GI bleeding. Pyuria. Probable UTI. Kidney stone. Left ureteral stent. h/o prior UTI with ESBL E.coli. Severe anemia s/p PRBC.   -acute respiratory failure s/p EGD  -BC x 2, urine c/s reviewed  -on meropenem 1 gm IV q8h # 3  -tolerating abx well so far; no side effects noted  -contact isolation  -continue abx coverage  -GI evaluation appreciated; may need transfer to [place]  -monitor temps  -f/u CBC  -supportive care  2. Other issues:   -care per medicine

## 2019-11-01 NOTE — PROGRESS NOTE ADULT - SUBJECTIVE AND OBJECTIVE BOX
Events Overnight: had IR angio, unable to cannulate vessels, hemoglobin appear stable    HPI:  , pt is 69 yo F admitted 10/28 - h/o recurrent GI bleeds, thought to be due to a Dieulafoy's lesion, and was discharged 10/25 with a hgb 9.  On day of admission pt rec'd call from lab - hgb of 4.       Patient has history of aortic dissection repain, and hx. of paraplegia from spinal hematoma,    Has had extensive w/u for these multiple bleeding episodes, including multiple endoscopies, capsule studies, and felt to be gastric, but actually source has never been     Patient also with recurrent utis has ureteral stent for obstructing kidney stone    adequately visualized, Each episode is typically massive bleeding for few minutes which than stops.    Patient is now intubated , hgb appear stable on ventilator     MEDICATIONS  (STANDING):  atorvastatin 40 milliGRAM(s) Oral at bedtime  baclofen 20 milliGRAM(s) Oral two times a day  DULoxetine 30 milliGRAM(s) Oral two times a day  gabapentin   Solution 800 milliGRAM(s) Oral every 8 hours  labetalol 200 milliGRAM(s) Oral two times a day  lactated ringers. 1000 milliLiter(s) (100 mL/Hr) IV Continuous <Continuous>  meropenem  IVPB 1000 milliGRAM(s) IV Intermittent every 8 hours  ofloxacin 0.3% Solution 1 Drop(s) Both EYES four times a day  pantoprazole Infusion 8 mG/Hr (10 mL/Hr) IV Continuous <Continuous>  potassium chloride  10 mEq/100 mL IVPB 10 milliEquivalent(s) IV Intermittent every 1 hour  prednisoLONE acetate 1% Suspension 1 Drop(s) Both EYES four times a day  propofol Infusion 5 MICROgram(s)/kG/Min (1.524 mL/Hr) IV Continuous <Continuous>  senna 2 Tablet(s) Oral at bedtime  valACYclovir 1000 milliGRAM(s) Oral three times a day    MEDICATIONS  (PRN):  acetaminophen   Tablet .. 650 milliGRAM(s) Oral every 6 hours PRN Temp greater or equal to 38C (100.4F), Mild Pain (1 - 3)  artificial  tears Solution 1 Drop(s) Both EYES every 2 hours PRN Dry Eyes  lidocaine   Patch 1 Patch Transdermal daily PRN pain  magnesium hydroxide Suspension 30 milliLiter(s) Oral daily PRN Constipation  oxyCODONE    IR 10 milliGRAM(s) Oral three times a day PRN Severe Pain (7 - 10)       General -      weak, pale   Neuro - opens eyes, follows commands with upper extremities   neck - supple   lungs clear   cv rrr   abdomen soft, non tender, some dark blood in ng cannister   ext - lower extremities contracted warm       ICU Vital Signs Last 24 Hrs  T(C): 36.4 (01 Nov 2019 07:15), Max: 36.6 (01 Nov 2019 02:00)  T(F): 97.6 (01 Nov 2019 07:15), Max: 97.8 (01 Nov 2019 02:00)  HR: 98 (01 Nov 2019 07:15) (64 - 124)  BP: 97/50 (01 Nov 2019 07:15) (97/50 - 167/93)  BP(mean): 62 (01 Nov 2019 07:15) (62 - 105)  ABP: --  ABP(mean): --  RR: 15 (01 Nov 2019 07:15) (7 - 25)  SpO2: 100% (01 Nov 2019 07:15) (93% - 100%)      Mode: AC/ CMV (Assist Control/ Continuous Mandatory Ventilation)  RR (machine): 10  TV (machine): 450  FiO2: 40  PEEP: 5  ITime: 1  PIP: 20    I&O's Summary    31 Oct 2019 07:01  -  01 Nov 2019 07:00  --------------------------------------------------------  IN: 2700 mL / OUT: 1450 mL / NET: 1250 mL    01 Nov 2019 07:01  -  01 Nov 2019 09:49  --------------------------------------------------------  IN: 1491.4 mL / OUT: 0 mL / NET: 1491.4 mL                            8.4    10.82 )-----------( 276      ( 01 Nov 2019 07:13 )             26.4     11-01    148<H>  |  118<H>  |  19  ----------------------------<  98  3.4<L>   |  25  |  0.35<L>    Ca    7.7<L>      01 Nov 2019 07:13  Phos  3.2     11-01  Mg     1.9     11-01    ABG - ( 01 Nov 2019 05:45 )  pH, Arterial: 7.36  pH, Blood: x     /  pCO2: 40    /  pO2: 143   / HCO3: 22    / Base Excess: -2.5  /  SaO2: 99          DVT Prophylaxis:   Venodynes                                                              Advanced Directives: Full code

## 2019-11-02 NOTE — H&P ADULT - HISTORY OF PRESENT ILLNESS
Pt is a 67 yo Female with a PMH/o aortic dissection s/p multiple repairs, spinal hematoma resulting in paraplegia (on baclofen pump), CVA, nephrolithiasis with retained stent (did not f/u to remove as per pt s/p d/c home), recurrent UTIs ESBL positive in the past, HTN, PAD, and HSV endophthalmitis/keratitis s/p left corneal transplant who presented to [facility] on 10/28 with lethargy, weakness, malaise. Per chart review, Pt denied any bright red bleeding per rectum, vomiting, diarrhea, nausea, abdominal pain, but did endorse dark stool. Of note, Pt has been hospitalized multiple times with endoscopies and capsule endoscopies, and CTA's which have all been without source of bleeding. 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 On day of admission the patient's hemoglobin was 5.2. The patient was transfused 6 units of PRBCs. The patient went for an endoscopy on 10/31 and was found to have red blood in the gastric fundus and body but the source of the blood could not be found. IR was consulted for an emergent arteriogram. 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 The patient was intubated and placed under ICU care for acute hypoxic respiratory failure and then extubated the next morning once she was hemodynamically stable. The patient's hemoglobin continues to decline. The patient is also being treated for UTI with meropenem given history of ESBL. On day of transfer the patient will have received 4 days worth of meropenem. Patient is being transferred to [facility] now for further management for her GI bleed as well as possible partial gastrectomy.     Of note, patient recently admitted to [place] on 10/17 for hypotension, found to be anemic. Patient had EGD done x2, capsule endoscopy as well as CTA performed, all of which did not identify source of her bleeding. 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 Pt is a 67 yo Female with a PMH/o aortic dissection s/p multiple repairs, spinal hematoma resulting in paraplegia (on baclofen pump), nephrolithiasis with retained stent (did not f/u to remove as per pt s/p d/c home), recurrent UTIs ESBL positive in the past 2/2 to straight catherization, HTN, PAD, and HSV endophthalmitis/keratitis s/p left corneal transplant who presented to [facility] on 10/28 with lethargy, weakness, malaise. Per chart review, Pt denied any bright red bleeding per rectum, vomiting, diarrhea, nausea, abdominal pain, but did endorse dark stool. Of note, Pt has been hospitalized multiple times with endoscopies and capsule endoscopies, and CTA's which have all been without source of bleeding. 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 On day of admission the patient's hemoglobin was 5.2. The patient was transfused 6 units of PRBCs. The patient went for an endoscopy on 10/31 and was found to have red blood in the gastric fundus and body but the source of the blood could not be found. IR was consulted for an emergent arteriogram. 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 The patient was intubated and placed under ICU care for acute hypoxic respiratory failure and then extubated the next morning once she was hemodynamically stable. The patient's hemoglobin continues to decline. The patient is also being treated for UTI with meropenem given history of ESBL. On day of transfer the patient will have received 4 days worth of meropenem. Patient is being transferred to [facility] MICU now for further management for her GI bleed as well as possible partial gastrectomy.     Of note, patient recently admitted to [place] on 10/17 for hypotension, found to be anemic. Patient had EGD done x2, capsule endoscopy as well as CTA performed, all of which did not identify source of her bleeding. 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

## 2019-11-02 NOTE — H&P ADULT - ATTENDING COMMENTS
care provided 11/2/19  I have personally provided 40 minutes of critical care time concurrently with the resident/fellow.  This time excludes time spent on separate procedures and time spent teaching.  I have reviewed the resident/fellow's documentation and I agree with the assessment and plan of care.     Pt is a 67yo woman with hx as above including paraplegia requiring baclofen pump/neurontin/oxycodone complicated by ureteral stones requiring stent and chronic ESBL UTI s/p many courses of abx.  Also with recurrent upper GIB thought due to Dieulafoy leisons.  Pt is now accepted from [facility] in the setting of recurrent UGIB requiring large volume transfusion.  EGD showed only blood, and IR at [facility] was unable to cannulate celiac axis.  Patient is transferred to [facility] MICU for further care, and for eval here by GI/IR/surgery.  Currently pt is awake and alert, with stable hemodynamics.  Await and appreciate above consultations.  Full plan as above and I have edited as appropriate.

## 2019-11-02 NOTE — PROGRESS NOTE ADULT - ASSESSMENT
69 yo F admitted 10/28 - h/o recurrent GI bleeds,   - now with recurrent GI hemorrhage and anemia due to acute blood loss  unable to be localized or treated by GI and IR, always appears to be gastric, but no visible source ever seen  hgb has drifted down this am, still some dark stool, will give additional unit of blood  will continue protonix drip, start clear liquids  now extubated, breathing comfortable  Patient is on meropenem for requirent uti. has stent and kidney stone which will eventually require further intervention  Surgical team involved in case of emergent need for intervention  awaiting transfer to [place], bed availability

## 2019-11-02 NOTE — PROGRESS NOTE ADULT - ASSESSMENT
69yo with recurrent UGI bleeding  bleeding appears to come from proximal body of stomach   EGD either reveals active bleeding, or large clots that preclude visualization of source or when done at time of nonactive bleeding, show largely normal mucosa without evidence of source  Presumed diagnosis is Dieulafoy lesion, perhaps with some connection to a collateral formed with abnormal anatomy post aortic dissection  Planning transfer to [place] - in my opinion, plan should for surgery with possible partial gastrectomy at time of rebleeding  Continue ppi  transfuse as needed  no signs active bleeding at this time

## 2019-11-02 NOTE — DISCHARGE NOTE NURSING/CASE MANAGEMENT/SOCIAL WORK - PATIENT PORTAL LINK FT
You can access the FollowMyHealth Patient Portal offered by [facility] by registering at the following website: [email or web address]. By joining [employer]’s FollowMyHealth portal, you will also be able to view your health information using other applications (apps) compatible with our system.

## 2019-11-02 NOTE — PROGRESS NOTE ADULT - ASSESSMENT
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 The pt received call about abnormal labs with hgb of 4. Pt denies any bright red bleeding per rectum, vomiting, diarrhea, nausea, abdominal pain, but does endorse dark stool. In ER he ws noted with low grade fever. He received zosyn.     1. Lower GI bleeding. Pyuria. Probable UTI. Kidney stone. Left ureteral stent. h/o prior UTI with ESBL E.coli. Severe anemia s/p PRBC.   -acute respiratory failure s/p EGD  -BC x 2, urine c/s reviewed  -on meropenem 1 gm IV q8h # 4  -tolerating abx well so far; no side effects noted  -contact isolation  -continue abx coverage  -GI evaluation appreciated; may need transfer to [place]  -plan for PRBC  -monitor BM's  -monitor temps  -f/u CBC  -supportive care  2. Other issues:   -care per medicine

## 2019-11-02 NOTE — PROGRESS NOTE ADULT - SUBJECTIVE AND OBJECTIVE BOX
Patient is a 68y old  Female who presents with a chief complaint of symptomatic anemia (02 Nov 2019 09:01)      HPI:  pt feeling well  no new complaints  some melena but not small amounts    PAST MEDICAL & SURGICAL HISTORY:  Melena: 10/7/2019  Gastrointestinal hemorrhage: 9/28/2019, 9/4/2019, 8/29/2019  Dieulafoy lesion of stomach or duodenum: 10/1/2019  Subdural hematoma, nontraumatic: spontaneous  Dorsalgia of lumbar region: on pain medication /baclofen po and pump  Self-catheterizes urinary bladder  Anemia: chronic  Uveitis  Osteoporosis  PAD (peripheral artery disease)  Hematoma: spinal treated September 2018  Paraplegia: due to spinal hematoma, on wheelchair goes to physical therapy 2 x weekly  Aortic dissection, thoracic: Type A Repaired 2009  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 2018  UTI (urinary tract infection): recurrent  TIA (transient ischemic attack)  HTN (Hypertension)  History of corneal transplant: left corneal transplant on 5/21/2018  Disorder of spine: unthetethering 2 x  Presence of IVC filter: 2014 ?  S/P aortic dissection repair: Type A Dissection repair /2009   descending aortic aneurysm repair 9/2016  H/O Spinal surgery: laminectomies 2014    MEDICATIONS  (STANDING):  atorvastatin 40 milliGRAM(s) Oral at bedtime  baclofen 20 milliGRAM(s) Oral two times a day  DULoxetine 30 milliGRAM(s) Oral two times a day  gabapentin 800 milliGRAM(s) Oral every 8 hours  labetalol 200 milliGRAM(s) Oral two times a day  meropenem  IVPB 1000 milliGRAM(s) IV Intermittent every 8 hours  ofloxacin 0.3% Solution 1 Drop(s) Both EYES four times a day  pantoprazole Infusion 8 mG/Hr (10 mL/Hr) IV Continuous <Continuous>  potassium chloride    Tablet ER 40 milliEquivalent(s) Oral once  prednisoLONE acetate 1% Suspension 1 Drop(s) Both EYES four times a day  senna 2 Tablet(s) Oral at bedtime  valACYclovir 1000 milliGRAM(s) Oral three times a day    MEDICATIONS  (PRN):  acetaminophen   Tablet .. 650 milliGRAM(s) Oral every 6 hours PRN Temp greater or equal to 38C (100.4F), Mild Pain (1 - 3)  artificial  tears Solution 1 Drop(s) Both EYES every 2 hours PRN Dry Eyes  lidocaine   Patch 1 Patch Transdermal daily PRN pain  magnesium hydroxide Suspension 30 milliLiter(s) Oral daily PRN Constipation  oxyCODONE    IR 10 milliGRAM(s) Oral three times a day PRN Severe Pain (7 - 10)    Allergies  No Known Allergies    REVIEW OF SYSTEMS:    CONSTITUTIONAL: weakness  RESPIRATORY: No cough, wheezing, hemoptysis; No shortness of breath  CARDIOVASCULAR: No chest pain or palpitations  GASTROINTESTINAL: as above  All other review of systems is negative unless indicated above.    Vital Signs Last 24 Hrs  T(C): 36.1 (02 Nov 2019 09:33), Max: 37 (01 Nov 2019 12:00)  T(F): 97 (02 Nov 2019 09:33), Max: 98.6 (01 Nov 2019 12:00)  HR: 66 (02 Nov 2019 09:29) (62 - 93)  BP: 126/56 (02 Nov 2019 09:29) (113/58 - 171/84)  BP(mean): 73 (02 Nov 2019 09:00) (71 - 106)  RR: 18 (02 Nov 2019 09:29) (11 - 19)  SpO2: 100% (02 Nov 2019 06:00) (99% - 100%)    PHYSICAL EXAM:  Constitutional: NAD  Respiratory: CTAB  Cardiovascular: S1 and S2, RRR, no M/G/R  Gastrointestinal: BS+, soft, NT/ND      LABS:                        7.4    6.41  )-----------( 207      ( 02 Nov 2019 06:34 )             23.7     11-02    145  |  114<H>  |  16  ----------------------------<  88  3.2<L>   |  26  |  0.30<L>    Ca    8.2<L>      02 Nov 2019 06:34  Phos  3.2     11-01  Mg     1.7     11-02            RADIOLOGY & ADDITIONAL STUDIES:

## 2019-11-02 NOTE — H&P ADULT - NSHPREVIEWOFSYSTEMS_GEN_ALL_CORE
REVIEW OF SYSTEMS:    CONSTITUTIONAL: No weakness, fevers or chills  EYES/ENT: No visual changes;  No vertigo or throat pain   NECK: No pain or stiffness  RESPIRATORY: No cough, wheezing, hemoptysis; No shortness of breath  CARDIOVASCULAR: No chest pain or palpitations  GASTROINTESTINAL: No abdominal or epigastric pain. No nausea, vomiting, or hematemesis; No diarrhea or constipation. Dark stools. No BRBPR.   GENITOURINARY: No dysuria, frequency or hematuria  NEUROLOGICAL: No numbness or weakness  SKIN: No itching, rashes

## 2019-11-02 NOTE — PROGRESS NOTE ADULT - REASON FOR ADMISSION
symptomatic anemia

## 2019-11-02 NOTE — PATIENT PROFILE ADULT - FALL HARM RISK
coagulation(Bleeding disorder R/T clinical cond/anti-coags)/paraplegia/other/bones(Osteoporosis,prev fx,steroid use,metastatic bone ca)

## 2019-11-02 NOTE — CONSULT NOTE ADULT - ASSESSMENT
ASSESSMENT: 68 Year-Old Lady with history of aortic dissection s/p thoraco-abdominal aortic replacement with aorto-mesenteric bypass in 09/2016 with Dr. 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     PLAN:    - no acute surgical intervention  - GI consultation  - transfuse as needed  - care per MICU    Discussed w/ Dr. West  [facility], [phone]

## 2019-11-02 NOTE — DISCHARGE NOTE PROVIDER - CARE PROVIDER_API CALL
Rodri Yoo)  Gastroenterology; Internal Medicine  [address] [place] [address]  Phone: [phone]  Fax: [phone]  Follow Up Time: 1 week

## 2019-11-02 NOTE — PROGRESS NOTE ADULT - PROVIDER SPECIALTY LIST ADULT
Critical Care
Critical Care
Family Medicine
Gastroenterology
Gastroenterology
Hospitalist
Infectious Disease
Surgery
Urology
Gastroenterology
Critical Care

## 2019-11-02 NOTE — PROGRESS NOTE ADULT - SUBJECTIVE AND OBJECTIVE BOX
Events Overnight:  Patient hemodynamically stable, hgb 7.4    HPI:      Pt is 69 yo F admitted 10/28 - h/o recurrent GI bleeds,  and was discharged 10/25 with a hgb 9.  On day of admission pt rec'd call from lab - hgb of 4.         Patient has history of aortic dissection repain, and hx. of paraplegia from spinal hematoma,      Has had extensive w/u for these multiple bleeding episodes, including multiple endoscopies, capsule studies, and felt to be gastric, but actually source has never been      Patient also with recurrent utis has ureteral stent for obstructing kidney stone      Adequately visualized, Each episode is typically massive bleeding for few minutes which than stops.      Patient went to IR with last bleeding episode and was unable to cannulate vessels.      No episodes of hypotension, overnight., still some dark stools,     ROS - no complaints, no chest pain, no shortness of breathe , breathing comfortable    MEDICATIONS  (STANDING):  atorvastatin 40 milliGRAM(s) Oral at bedtime  baclofen 20 milliGRAM(s) Oral two times a day  DULoxetine 30 milliGRAM(s) Oral two times a day  gabapentin 800 milliGRAM(s) Oral every 8 hours  labetalol 200 milliGRAM(s) Oral two times a day  meropenem  IVPB 1000 milliGRAM(s) IV Intermittent every 8 hours  ofloxacin 0.3% Solution 1 Drop(s) Both EYES four times a day  pantoprazole Infusion 8 mG/Hr (10 mL/Hr) IV Continuous <Continuous>  prednisoLONE acetate 1% Suspension 1 Drop(s) Both EYES four times a day  senna 2 Tablet(s) Oral at bedtime  valACYclovir 1000 milliGRAM(s) Oral three times a day    MEDICATIONS  (PRN):  acetaminophen   Tablet .. 650 milliGRAM(s) Oral every 6 hours PRN Temp greater or equal to 38C (100.4F), Mild Pain (1 - 3)  artificial  tears Solution 1 Drop(s) Both EYES every 2 hours PRN Dry Eyes  lidocaine   Patch 1 Patch Transdermal daily PRN pain  magnesium hydroxide Suspension 30 milliLiter(s) Oral daily PRN Constipation  oxyCODONE    IR 10 milliGRAM(s) Oral three times a day PRN Severe Pain (7 - 10)    General - weak, pale  Neuro - awake and alert, comfortable, paraplegic  HEENT nc/at  neck - supple  lungs clear  abdomen soft, non tender  ext warm, lower extremities contracted      ICU Vital Signs Last 24 Hrs  T(C): 36.5 (02 Nov 2019 05:30), Max: 37.1 (01 Nov 2019 09:49)  T(F): 97.7 (02 Nov 2019 05:30), Max: 98.7 (01 Nov 2019 09:49)  HR: 69 (02 Nov 2019 06:00) (69 - 93)  BP: 141/60 (02 Nov 2019 06:00) (94/52 - 171/84)  BP(mean): 82 (02 Nov 2019 06:00) (61 - 106)  ABP: --  ABP(mean): --  RR: 16 (02 Nov 2019 06:00) (11 - 19)  SpO2: 100% (02 Nov 2019 06:00) (99% - 100%)    I&O's Summary    01 Nov 2019 07:01  -  02 Nov 2019 07:00  --------------------------------------------------------  IN: 3824.4 mL / OUT: 1450 mL / NET: 2374.4 mL                          7.4    6.41  )-----------( 207      ( 02 Nov 2019 06:34 )             23.7     11-02    145  |  114<H>  |  16  ----------------------------<  88  3.2<L>   |  26  |  0.30<L>    Ca    8.2<L>      02 Nov 2019 06:34  Phos  3.2     11-01  Mg     1.7     11-02    ABG - ( 01 Nov 2019 05:45 )  pH, Arterial: 7.36  pH, Blood: x     /  pCO2: 40    /  pO2: 143   / HCO3: 22    / Base Excess: -2.5  /  SaO2: 99          DVT Prophylaxis:   venodynes                                                              Advanced Directives: Full Code

## 2019-11-02 NOTE — DISCHARGE NOTE PROVIDER - HOSPITAL COURSE
Pt is a 67 yo Female with a PMH/o aortic dissection s/p multiple repairs, spinal hematoma resulting in paraplegia (on baclofen pump), CVA, nephrolithiasis with retained stent (did not f/u to remove as per pt s/p d/c home), recurrent UTIs ESBL positive in the past, HTN, PAD, and HSV endophthalmitis/keratitis s/p left corneal transplant who presents with lethargy, weakness, malaise. 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 On day of admission the patient's hemoglobin was 5.2. The patient was transfused 6 units of PRBCs. The patient went for an endoscopy on 10/31 and was found to have red blood in the gastric fundus and body but the source of the blood could not be found. IR was consulted for an emergent arteriogram. 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 The patient was intubated and placed under ICU care for acute hypoxic respiratory failure and then extubated the next morning once she was hemodynamically stable. The patient's hemoglobin continues to decline. The patient is also being treated for UTI with meropenem given history of ESBL. On day of transfer the patient will have received 4 days worth of meropenem. The patient will be transferred to [place] for possible partial gastrectomy.        Vital Signs Last 24 Hrs    T(C): 36.1 (02 Nov 2019 09:33), Max: 37 (01 Nov 2019 12:00)    T(F): 97 (02 Nov 2019 09:33), Max: 98.6 (01 Nov 2019 12:00)    HR: 66 (02 Nov 2019 09:29) (62 - 88)    BP: 126/56 (02 Nov 2019 09:29) (115/56 - 171/84)    BP(mean): 73 (02 Nov 2019 09:00) (71 - 106)    RR: 18 (02 Nov 2019 09:29) (11 - 19)    SpO2: 100% (02 Nov 2019 06:00) (99% - 100%)        PE     General NAD, Pale complexion    cardio s1 s2 rrr no murmur gallop or rubs    resp ctab/l    abdomen soft nt nd    ext warm 2+ peripheral pulses

## 2019-11-02 NOTE — H&P ADULT - NSHPLABSRESULTS_GEN_ALL_CORE
11-02    145  |  114<H>  |  16  ----------------------------<  88  3.2<L>   |  26  |  0.30<L>    Ca    8.2<L>      02 Nov 2019 06:34  Phos  3.2     11-01  Mg     1.7     11-02    CBC Full  -  ( 02 Nov 2019 06:34 )  WBC Count : 6.41 K/uL  RBC Count : 2.70 M/uL  Hemoglobin : 7.4 g/dL  Hematocrit : 23.7 %  Platelet Count - Automated : 207 K/uL  Mean Cell Volume : 87.8 fl  Mean Cell Hemoglobin : 27.4 pg  Mean Cell Hemoglobin Concentration : 31.2 gm/dL  Auto Neutrophil # : x  Auto Lymphocyte # : x  Auto Monocyte # : x  Auto Eosinophil # : x  Auto Basophil # : x  Auto Neutrophil % : x  Auto Lymphocyte % : x  Auto Monocyte % : x  Auto Eosinophil % : x  Auto Basophil % : x    CT A/P 10/28: IMPRESSION:     1. Infrarenal abdominal aortic dissection is again noted and is not   significantly changed.   2. Mild left hydronephrosis. Left renal collecting system stent. 6 x 9   mm, calculus noted within the left distal ureter, without significant   change. Tiny   left renal calculus.   3. Large amount of stool in the colon with rectal wall thickening and   adjacent perirectal infiltrative changes compatible with rectal   impaction, clinically correlate to exclude stercoral proctitis.    EGD 10/31: Red blood in the gastric fundus and in the gastric body.

## 2019-11-02 NOTE — DISCHARGE NOTE PROVIDER - NSDCCPCAREPLAN_GEN_ALL_CORE_FT
PRINCIPAL DISCHARGE DIAGNOSIS  Diagnosis: Gastrointestinal hemorrhage  Assessment and Plan of Treatment: GI hemorrhage due to Dielafoys lesion  The patient will be transferred to [place] for a partial gastrectomy      SECONDARY DISCHARGE DIAGNOSES  Diagnosis: History of herpes simplex keratitis  Assessment and Plan of Treatment: continue prednisolone, and ofloxacin eye drops. Continue valtrex 1gm tid    Diagnosis: UTI (urinary tract infection)  Assessment and Plan of Treatment: UTI (urinary tract infection) w/ hx of esbl  The patient has recieved 4 days worth of meropenem  Would recommend infectious disease at [place] evaluate the patient for duration of meropenem    Diagnosis: Hypertension  Assessment and Plan of Treatment: continue [id number] twice a day with holding parameters    Diagnosis: Dieulafoy lesion of stomach or duodenum  Assessment and Plan of Treatment: Dieulafoy lesion of stomach or duodenum  transfer to [place] for partial gastrectomy    Diagnosis: Paraplegia  Assessment and Plan of Treatment: Paraplegia from a hematoma  continue home medication of baclofen 20mg twice a day along with baclofen pump, duloxetine 30mg twice a day, and gabapentin 800mg three times a day

## 2019-11-02 NOTE — DISCHARGE NOTE PROVIDER - NSDCMRMEDTOKEN_GEN_ALL_CORE_FT
atorvastatin 40 mg oral tablet: 1 tab(s) orally once a day (at bedtime)  baclofen 20 mg oral tablet: 1 tab(s) orally 2 times a day  diclofenac 1% topical gel: Apply 4 grams to affected area 4 times daily as needed  DULoxetine 30 mg oral delayed release capsule: 1 cap(s) orally 2 times a day  gabapentin 400 mg oral capsule: 2 cap(s) orally every 8 hours  labetalol 200 mg oral tablet: 1 tab(s) orally 2 times a day  lidocaine 5% topical film:  topically   meropenem 1000 mg intravenous injection: 1000 milligram(s) intravenous every 8 hours  ofloxacin 0.3% ophthalmic solution: 1 drop(s) to each affected eye 4 times a day  oxyCODONE 10 mg oral tablet: 1 tab(s) orally 3 times a day, As needed, Severe Pain (7 - 10)  pantoprazole 40 mg intravenous injection:  intravenous   prednisoLONE acetate 1% ophthalmic suspension: 1 drop(s) to each affected eye 4 times a day  valACYclovir 1 g oral tablet: 1 tab(s) orally 3 times a day  zolpidem 5 mg oral tablet: 1 tab(s) orally once a day (at bedtime), As Needed MDD:5 mgs

## 2019-11-02 NOTE — PROGRESS NOTE ADULT - SUBJECTIVE AND OBJECTIVE BOX
Date of service: 19 @ 09:02    Lying in bed in NAD  Weak looking  Has dark stools  Hb is dropping    ROS: no fever or chills; denies dizziness, no HA, no SOB or cough, no abdominal pain, no diarrhea or constipation; no dysuria, no legs pain, no rashes    MEDICATIONS  (STANDING):  atorvastatin 40 milliGRAM(s) Oral at bedtime  baclofen 20 milliGRAM(s) Oral two times a day  DULoxetine 30 milliGRAM(s) Oral two times a day  gabapentin 800 milliGRAM(s) Oral every 8 hours  labetalol 200 milliGRAM(s) Oral two times a day  meropenem  IVPB 1000 milliGRAM(s) IV Intermittent every 8 hours  ofloxacin 0.3% Solution 1 Drop(s) Both EYES four times a day  pantoprazole Infusion 8 mG/Hr (10 mL/Hr) IV Continuous <Continuous>  prednisoLONE acetate 1% Suspension 1 Drop(s) Both EYES four times a day  senna 2 Tablet(s) Oral at bedtime  valACYclovir 1000 milliGRAM(s) Oral three times a day      Vital Signs Last 24 Hrs  T(C): 36.5 (2019 05:30), Max: 37.1 (2019 09:49)  T(F): 97.7 (2019 05:30), Max: 98.7 (2019 09:49)  HR: 69 (2019 06:00) (69 - 93)  BP: 141/60 (2019 06:00) (94/52 - 171/84)  BP(mean): 82 (2019 06:00) (61 - 106)  RR: 16 (2019 06:00) (11 - 19)  SpO2: 100% (2019 06:00) (99% - 100%)    Physical Exam:      Constitutional: frail looking  HEENT: NC/AT, EOMI, PERRLA, conjunctivae clear  Neck: supple; thyroid not palpable  Back: no tenderness  Respiratory: respiratory effort normal; decreased BS at bases  Cardiovascular: S1S2 regular, no murmurs  Abdomen: soft, not tender, not distended, positive BS  Genitourinary: no suprapubic tenderness  Lymphatic: no LN palpable  Musculoskeletal: no muscle tenderness, no joint swelling or tenderness  Extremities: no pedal edema  Neurological/ Psychiatric: AxOx2, moving all extremities  Skin: no rashes; no palpable lesions    Labs: reviewed                        7.4    6.41  )-----------( 207      ( 2019 06:34 )             23.7     11-    145  |  114<H>  |  16  ----------------------------<  88  3.2<L>   |  26  |  0.30<L>    Ca    8.2<L>      2019 06:34  Phos  3.2     11-01  Mg     1.7                             6.9    7.02  )-----------( 225      ( 30 Oct 2019 11:06 )             21.5     10-30    143  |  110<H>  |  26<H>  ----------------------------<  99  4.0   |  27  |  0.47<L>    Ca    8.3<L>      30 Oct 2019 06:50  Phos  2.7     10-  Mg     2.1     10-30    TPro  5.2<L>  /  Alb  2.1<L>  /  TBili  0.7  /  DBili  x   /  AST  18  /  ALT  19  /  AlkPhos  67  10-29     LIVER FUNCTIONS - ( 29 Oct 2019 06:22 )  Alb: 2.1 g/dL / Pro: 5.2 gm/dL / ALK PHOS: 67 U/L / ALT: 19 U/L / AST: 18 U/L / GGT: x           Urinalysis Basic - ( 29 Oct 2019 05:45 )    Color: Yellow / Appearance: Clear / S.010 / pH: x  Gluc: x / Ketone: Negative  / Bili: Negative / Urobili: Negative mg/dL   Blood: x / Protein: 30 mg/dL / Nitrite: Positive   Leuk Esterase: Moderate / RBC: 3-5 /HPF / WBC >50   Sq Epi: x / Non Sq Epi: Few / Bacteria: TNTC      Culture - Urine (collected 23 Oct 2019 16:30)  Source: .Urine Catheterized  Final Report (25 Oct 2019 06:45):    >=3 organisms. Probable collection contamination.    Radiology: all available radiological tests reviewed    < from: CT Abdomen and Pelvis w/ IV Cont (10.28.19 @ 21:47) >  1. Infrarenal abdominal aortic dissection is again noted and is not   significantly changed.   2. Mild left hydronephrosis. Left renal collecting system stent. 6 x 9   mm, calculus noted within the left distal ureter, without significant   change. Tiny   left renal calculus.   3. Large amount of stool in the colon with rectal wall thickening and   adjacent perirectal infiltrative changes compatible with rectal   impaction, clinically correlate to exclude stercoral proctitis.    < end of copied text >      Advanced directives addressed: full resuscitation

## 2019-11-02 NOTE — H&P ADULT - ASSESSMENT
67 yo Female with a PMH/o aortic dissection s/p multiple repairs, spinal hematoma resulting in paraplegia (on baclofen pump), nephrolithiasis with retained stent (did not f/u to remove as per pt s/p d/c home), recurrent UTIs ESBL positive in the past likely 2/2 to pt straight cath herself, HTN, PAD, and HSV endophthalmitis/keratitis s/p left corneal transplant and extensive h/o recurrent GI bleeds with no identifiable source of bleeding who presented to [facility] on 10/28 with lethargy, weakness, malaise, found to be anemic to 5.2 and was transfused. Patient is s/p EGD and IR angiogram which have not identified source of bleeding. Patient now transferred to [facility]U for further management of GI bleed as well as possible surgical interventions.    #Neuro  -A&Ox3, at her baseline  -Neuro checks  -Cont to monitor  -Hx of spinal hematoma leading to paraplegia  -c/w baclofen, oxycodone and lidocaine patch  -c/w duloxetine, ambien, neurontin  -Reposition q2 hours  -HSV endophthalmitis/keratitis s/p left corneal transplant - c/w Prednisolone, and Ofloxacin eye drops   -Valtrex 1g TID    #CV  -Hx of aortic dissection s/p multiple repairs with descending aortic aneurysm  -LV ejection fraction 60-65% on TTE Aug 2019  -Has hx of HTN on Labetalol, will hold for now  -Cont to monitor  -Vasc sx consult as if patient continues to bleed, may require surgery and would like vasc input due to extensive vasc hx, as per Dr. Ambrocio  -C/w lipitor 40 qd    #Pulm  -O2 sat WNL  -Cont to monitor    #GI  -H/o of recurrent bleeds requiring transfusions. S/p multiple EGD's, capsule endoscopy, angiogram all without identifiable source of bleeding.   -s/p 7 PRBC transfusion since admission at OSH  -Surgery had been consulted at OSH regarding need for surgical intervention - partial gastrectomy?  -Will c/s Vascular sx regarding hx of aortic dissection and descending aortic aneurysm, Dr. Ambrocio would like their input in case pt requires surg during admission  -GI c/s to follow - Dr. Ambrocio's covering physician  -Trend CBC q8  -Transfuse for Hb >7  -IV PPI BID  -Cont to monitor    #/Renal - Hx of nephrolithiasis and retained stent  -CT remarkable for mild left hydronephrosis, left collecting system stent and calculus noted within the left distal ureter.  -Stent to be removed as outpatient  -Monitor I/O  -Trend renal function  -Cont to monitor    #ID - hx of ESBL Klebsiella UTI in the past  -ID consult in AM, appreciate recs  -UA at OSH with moderate LE, Ucx negative  -Obtain UA/Ucx, blood cx  -C/w meropenem 1g q8 as patient has positive UA with retained stent and previous hx of ESBL  -Cont to monitor    #Heme  -Anemic due to recurrent GI bleeds without identifiable source of bleeding  -Trend CBC q8  -Transfuse for Hb<7  -Cont to monitor    #Endo  -NPO  -FS q6    #PPx  -SCDs  -NPO  -Full code

## 2019-11-02 NOTE — CONSULT NOTE ADULT - SUBJECTIVE AND OBJECTIVE BOX
GENERAL SURGERY CONSULT NOTE  --------------------------------------------------------------------------------------------    HPI:  Pt is a 67 yo Female with a PMH/o aortic dissection s/p multiple repairs, spinal hematoma resulting in paraplegia (on baclofen pump), nephrolithiasis with retained stent (did not f/u to remove as per pt s/p d/c home), recurrent UTIs ESBL positive in the past 2/2 to straight catherization, HTN, PAD, and HSV endophthalmitis/keratitis s/p left corneal transplant who presented to [facility] on 10/28 with lethargy, weakness, malaise. Per chart review, Pt denied any bright red bleeding per rectum, vomiting, diarrhea, nausea, abdominal pain, but did endorse dark stool. Of note, Pt has been hospitalized multiple times with endoscopies and capsule endoscopies, and CTA's which have all been without source of bleeding, however GI has noted a possible Dielafoy lesion. On day of admission the patient's hemoglobin was 5.2. The patient was transfused 6 units of PRBCs. The patient went for an endoscopy on 10/31 and was found to have red blood in the gastric fundus and body but the source of the blood could not be found. IR was consulted for an emergent arteriogram. 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 The patient was intubated and placed under ICU care for acute hypoxic respiratory failure and then extubated the next morning once she was hemodynamically stable. The patient's hemoglobin continued to decline. The patient is also being treated for UTI with meropenem given history of ESBL. On day of transfer the patient will have received 4 days worth of meropenem. Patient is being transferred to [facility] MICU now for further management for her GI bleed.   Of note, patient recently admitted to [place] on 10/17 for hypotension, found to be anemic. Patient had EGD done x2, capsule endoscopy as well as CTA performed, all of which did not identify source of her bleeding.    On presentation to the MICU, surgery was called to evaluate the pt for unknown source of GI bleed. Currently the pt is hemodynamically stable w/ an h/h of 8.6/26.7.     ***      PAST MEDICAL & SURGICAL HISTORY:  Melena: 10/7/2019  Gastrointestinal hemorrhage: 2019, 2019, 2019  Dieulafoy lesion of stomach or duodenum: 10/1/2019  Subdural hematoma, nontraumatic: spontaneous  Dorsalgia of lumbar region: on pain medication /baclofen po and pump  Self-catheterizes urinary bladder  Anemia: chronic  Uveitis  Osteoporosis  PAD (peripheral artery disease)  Hematoma: spinal treated 2018  Paraplegia: due to spinal hematoma, on wheelchair goes to physical therapy 2 x weekly  Aortic dissection, thoracic: Type A Repaired   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 2018  UTI (urinary tract infection): recurrent  TIA (transient ischemic attack)  HTN (Hypertension)  History of corneal transplant: left corneal transplant on 2018  Disorder of spine: unthetethering 2 x  Presence of IVC filter:  ?  S/P aortic dissection repair: Type A Dissection repair /   descending aortic aneurysm repair 2016  H/O Spinal surgery: laminectomies     FAMILY HISTORY:  No pertinent family history in first degree relatives: pt does not recall family history of medical problems in mother or father, both   [] Family history not pertinent as reviewed with the patient and family    SOCIAL HISTORY:  ***    ALLERGIES: No Known Allergies      HOME MEDICATIONS: ***    CURRENT MEDICATIONS  MEDICATIONS (STANDING): atorvastatin 40 milliGRAM(s) Oral at bedtime  baclofen 20 milliGRAM(s) Oral two times a day  DULoxetine 20 milliGRAM(s) Oral two times a day  gabapentin 600 milliGRAM(s) Oral three times a day  meropenem  IVPB 1000 milliGRAM(s) IV Intermittent every 8 hours  pantoprazole  Injectable 40 milliGRAM(s) IV Push two times a day  valACYclovir 1000 milliGRAM(s) Oral three times a day    MEDICATIONS (PRN):oxyCODONE    IR 10 milliGRAM(s) Oral three times a day PRN Severe Pain (7 - 10)  zolpidem 5 milliGRAM(s) Oral at bedtime PRN Insomnia    --------------------------------------------------------------------------------------------    Vitals:   T(C): 36.8 (19 @ 20:00), Max: 36.8 (19 17:20)  HR: 71 (19 21:00) (62 - 88)  BP: 158/71 (19 21:00) (115/56 - 167/78)  RR: 13 (19 21:00) (11 - 18)  SpO2: 96% (19 21:00) (96% - 100%)  CAPILLARY BLOOD GLUCOSE           @ 08:01  -   @ 21:45  --------------------------------------------------------  IN:    IV PiggyBack: 62.5 mL  Total IN: 62.5 mL    OUT:  Total OUT: 0 mL    Total NET: 62.5 mL        Height (cm): 170.2 ( @ 17:20)  Weight (kg): 57.6 ( 17:20)  BMI (kg/m2): 19.9 ( 17:20)  BSA (m2): 1.67 ( 17:20)      PHYSICAL EXAM: ***  General: NAD, Lying in bed comfortably  Neuro: A+Ox3  HEENT: NC/AT, EOMI  Neck: Soft, supple  Cardio: RRR, nml S1/S2  Resp: Good effort, CTA b/l  GI/Abd: Soft, NT/ND,baclofen pump in place, no rebound/guarding, no masses palpated  Vascular: All 4 extremities warm.  Musculoskeletal: All 4 extremities moving spontaneously, no limitations  --------------------------------------------------------------------------------------------    LABS  CBC ( 17:43)                              8.6<L>                         6.73    )----------------(  235        71.9  % Neutrophils, 13.5  % Lymphocytes, ANC: 4.83                                26.7<L>  CBC ( 06:34)                              7.4<L>                         6.41    )----------------(  207        --    % Neutrophils, --    % Lymphocytes, ANC: --                                  23.7<L>    BMP ( 17:43)             137     |  108     |  11    		Ca++ --      Ca 8.0<L>             ---------------------------------( 85    		Mg 1.7                5.1     |  23      |  0.35<L>			Ph 1.5<L>  BMP ( 06:34)             145     |  114<H>  |  16    		Ca++ --      Ca 8.2<L>             ---------------------------------( 88    		Mg 1.7                3.2<L>  |  26      |  0.30<L>			Ph --        LFTs (:43)      TPro 4.6<L> / Alb 2.7<L> / TBili 0.6 / DBili -- / AST 15 / ALT 11 / AlkPhos 62    Coags (:43)  aPTT 23.5<L> / INR 0.97 / PT 11.2          --------------------------------------------------------------------------------------------    MICROBIOLOGY    -> .Urine Catheterized Culture (10-31 @ 14:51)     NG    NG    <10,000 CFU/mL Normal Urogenital Zora    -> .Blood None Culture (10-30 @ 20:00)     NG    NG    No growth to date.    -> .Urine Catheterized Culture (10-30 @ 13:00)     NG    NG    >=3 organisms. Probable collection contamination.      --------------------------------------------------------------------------------------------    IMAGING  < from: CT Angio Abdomen and Pelvis w/ IV Cont (10.22.19 @ 10:59) >  FINDINGS:    LOWER CHEST: Small bilateral pleural effusions. Postoperative changes at   the left lung base. Small hiatal hernia. Low-attenuation the blood pool,   consistent with anemia.    LIVER: Within normal limits.  BILE DUCTS: Normal caliber.  GALLBLADDER: Within normal limits.  SPLEEN: Within normal limits.  PANCREAS: Within normal limits.  ADRENALS: Within normal limits.  KIDNEYS/URETERS: Left renal atrophy. Mild left hydronephrosis. Left   ureteral stent extends from the left renal pelvis into the bladder. A   calculus is present along the course of the stent measuring 1.1 cm.    BLADDER: Within normal limits.  REPRODUCTIVE ORGANS: Hysterectomy.    BOWEL: No bowel obstruction. Appendix is not visualized. Metallic clips   are present along the greater curvature of the stomach. There is no   evidence of active GI bleeding.  PERITONEUM: No ascites.  VESSELS: The main portal vein is at the upper limits normal in size   measuring 1.6 cm. No portal venous thrombosis.    Postoperative changes are presentin the suprarenal aorta. 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 The SMA is patent. Numerous splenic   collaterals are present.     Aortic dissection is present below the level of SMA extending into the   bilateral common and external iliac arteries. Caliber of the aorta is   stable. Findings are grossly unchanged from prior exam.    Infrarenal IVC filter.    RETROPERITONEUM/LYMPH NODES: No lymphadenopathy.    ABDOMINAL WALL: Anasarca.  BONES: Postoperative changes of the lower lumbar spine. Spinal stimulator.    IMPRESSION:     No evidence of active GI bleed.    < end of copied text >    < from: Upper Endoscopy (10.31.19 @ 15:14) >  FINDING Findings:    FINDING      Red blood was found in the gastric fundus and in the gastric body. Blood     FINDING      refluxed into esophagus. BLood and clot cleared but bleeding too active     FINDING      to see site. Scope withdrawn and she was intubated. I then repeated egd     FINDING      with similar findings. No active bleeding seen in antrum or duodenum and     FINDING      bleeding site seemed focused in prosimal stomach, ?proximal body given     FINDING      volume of bleeding.    < end of copied text >    -------------------------------------------------------------------------------------------- GENERAL SURGERY CONSULT NOTE  --------------------------------------------------------------------------------------------    HPI:  Pt is a 67 yo Female with a PMH/o aortic dissection s/p multiple repairs, spinal hematoma resulting in paraplegia (on baclofen pump), nephrolithiasis with retained stent (did not f/u to remove as per pt s/p d/c home), recurrent UTIs ESBL positive in the past 2/2 to straight catherization, HTN, PAD, and HSV endophthalmitis/keratitis s/p left corneal transplant who presented to [facility] on 10/28 with lethargy, weakness, malaise. Per chart review, Pt denied any bright red bleeding per rectum, vomiting, diarrhea, nausea, abdominal pain, but did endorse dark stool. Of note, Pt has been hospitalized multiple times with endoscopies and capsule endoscopies, and CTA's which have all been without source of bleeding, however GI has noted a possible Dielafoy lesion. On day of admission the patient's hemoglobin was 5.2. The patient was transfused 6 units of PRBCs. The patient went for an endoscopy on 10/31 and was found to have red blood in the gastric fundus and body but the source of the blood could not be found. IR was consulted for an emergent arteriogram. 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 The patient was intubated and placed under ICU care for acute hypoxic respiratory failure and then extubated the next morning once she was hemodynamically stable. The patient's hemoglobin continued to decline. The patient is also being treated for UTI with meropenem given history of ESBL. On day of transfer the patient will have received 4 days worth of meropenem. Patient is being transferred to [facility] MICU now for further management for her GI bleed.   Of note, patient recently admitted to [place] on 10/17 for hypotension, found to be anemic. Patient had EGD done x2, capsule endoscopy as well as CTA performed, all of which did not identify source of her bleeding.    On presentation to the MICU, surgery was called to evaluate the pt for unknown source of GI bleed. Currently the pt is hemodynamically stable w/ an h/h of 8.6/26.7.   Pt has been seen by both general and vascular surgery in past for GI bleed and for possible aortoenteric fistula. Per vascular surgery on Oct 19, aortoenteric fistula unlikely and has not been seen on any imaging performed.     ***      PAST MEDICAL & SURGICAL HISTORY:  Melena: 10/7/2019  Gastrointestinal hemorrhage: 2019, 2019, 2019  Dieulafoy lesion of stomach or duodenum: 10/1/2019  Subdural hematoma, nontraumatic: spontaneous  Dorsalgia of lumbar region: on pain medication /baclofen po and pump  Self-catheterizes urinary bladder  Anemia: chronic  Uveitis  Osteoporosis  PAD (peripheral artery disease)  Hematoma: spinal treated 2018  Paraplegia: due to spinal hematoma, on wheelchair goes to physical therapy 2 x weekly  Aortic dissection, thoracic: Type A Repaired   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 2018  UTI (urinary tract infection): recurrent  TIA (transient ischemic attack)  HTN (Hypertension)  History of corneal transplant: left corneal transplant on 2018  Disorder of spine: unthetethering 2 x  Presence of IVC filter:  ?  S/P aortic dissection repair: Type A Dissection repair /   descending aortic aneurysm repair 2016  H/O Spinal surgery: laminectomies     FAMILY HISTORY:  No pertinent family history in first degree relatives: pt does not recall family history of medical problems in mother or father, both   [] Family history not pertinent as reviewed with the patient and family    SOCIAL HISTORY:  ***    ALLERGIES: No Known Allergies      HOME MEDICATIONS: ***    CURRENT MEDICATIONS  MEDICATIONS (STANDING): atorvastatin 40 milliGRAM(s) Oral at bedtime  baclofen 20 milliGRAM(s) Oral two times a day  DULoxetine 20 milliGRAM(s) Oral two times a day  gabapentin 600 milliGRAM(s) Oral three times a day  meropenem  IVPB 1000 milliGRAM(s) IV Intermittent every 8 hours  pantoprazole  Injectable 40 milliGRAM(s) IV Push two times a day  valACYclovir 1000 milliGRAM(s) Oral three times a day    MEDICATIONS (PRN):oxyCODONE    IR 10 milliGRAM(s) Oral three times a day PRN Severe Pain (7 - 10)  zolpidem 5 milliGRAM(s) Oral at bedtime PRN Insomnia    --------------------------------------------------------------------------------------------    Vitals:   T(C): 36.8 (19 @ 20:00), Max: 36.8 (19 @ 17:20)  HR: 71 (19 21:00) (62 - 88)  BP: 158/71 (19 21:00) (115/56 - 167/78)  RR: 13 (19 21:00) (11 - 18)  SpO2: 96% (19 21:00) (96% - 100%)  CAPILLARY BLOOD GLUCOSE           @ 08:01  -   @ 21:45  --------------------------------------------------------  IN:    IV PiggyBack: 62.5 mL  Total IN: 62.5 mL    OUT:  Total OUT: 0 mL    Total NET: 62.5 mL        Height (cm): 170.2 ( 17:20)  Weight (kg): 57.6 ( 17:20)  BMI (kg/m2): 19.9 ( 17:20)  BSA (m2): 1.67 (:20)      PHYSICAL EXAM: ***  General: NAD, Lying in bed comfortably  Neuro: A+Ox3  HEENT: NC/AT, EOMI  Neck: Soft, supple  Cardio: RRR, nml S1/S2  Resp: Good effort, CTA b/l  GI/Abd: Soft, NT/ND,baclofen pump in place, no rebound/guarding, no masses palpated  Vascular: All 4 extremities warm.  Musculoskeletal: All 4 extremities moving spontaneously, no limitations  --------------------------------------------------------------------------------------------    LABS  CBC (:43)                              8.6<L>                         6.73    )----------------(  235        71.9  % Neutrophils, 13.5  % Lymphocytes, ANC: 4.83                                26.7<L>  CBC ( @ 06:34)                              7.4<L>                         6.41    )----------------(  207        --    % Neutrophils, --    % Lymphocytes, ANC: --                                  23.7<L>    BMP ( 17:43)             137     |  108     |  11    		Ca++ --      Ca 8.0<L>             ---------------------------------( 85    		Mg 1.7                5.1     |  23      |  0.35<L>			Ph 1.5<L>  BMP ( 06:34)             145     |  114<H>  |  16    		Ca++ --      Ca 8.2<L>             ---------------------------------( 88    		Mg 1.7                3.2<L>  |  26      |  0.30<L>			Ph --        LFTs ( 17:43)      TPro 4.6<L> / Alb 2.7<L> / TBili 0.6 / DBili -- / AST 15 / ALT 11 / AlkPhos 62    Coags ( 17:43)  aPTT 23.5<L> / INR 0.97 / PT 11.2          --------------------------------------------------------------------------------------------    MICROBIOLOGY    -> .Urine Catheterized Culture (10-31 @ 14:51)     NG    NG    <10,000 CFU/mL Normal Urogenital Zora    -> .Blood None Culture (10-30 @ 20:00)     NG    NG    No growth to date.    -> .Urine Catheterized Culture (10-30 @ 13:00)     NG    NG    >=3 organisms. Probable collection contamination.      --------------------------------------------------------------------------------------------    IMAGING  < from: CT Angio Abdomen and Pelvis w/ IV Cont (10.22.19 @ 10:59) >  FINDINGS:    LOWER CHEST: Small bilateral pleural effusions. Postoperative changes at   the left lung base. Small hiatal hernia. Low-attenuation the blood pool,   consistent with anemia.    LIVER: Within normal limits.  BILE DUCTS: Normal caliber.  GALLBLADDER: Within normal limits.  SPLEEN: Within normal limits.  PANCREAS: Within normal limits.  ADRENALS: Within normal limits.  KIDNEYS/URETERS: Left renal atrophy. Mild left hydronephrosis. Left   ureteral stent extends from the left renal pelvis into the bladder. A   calculus is present along the course of the stent measuring 1.1 cm.    BLADDER: Within normal limits.  REPRODUCTIVE ORGANS: Hysterectomy.    BOWEL: No bowel obstruction. Appendix is not visualized. Metallic clips   are present along the greater curvature of the stomach. There is no   evidence of active GI bleeding.  PERITONEUM: No ascites.  VESSELS: The main portal vein is at the upper limits normal in size   measuring 1.6 cm. No portal venous thrombosis.    Postoperative changes are presentin the suprarenal aorta. 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 The SMA is patent. Numerous splenic   collaterals are present.     Aortic dissection is present below the level of SMA extending into the   bilateral common and external iliac arteries. Caliber of the aorta is   stable. Findings are grossly unchanged from prior exam.    Infrarenal IVC filter.    RETROPERITONEUM/LYMPH NODES: No lymphadenopathy.    ABDOMINAL WALL: Anasarca.  BONES: Postoperative changes of the lower lumbar spine. Spinal stimulator.    IMPRESSION:     No evidence of active GI bleed.    < end of copied text >    < from: Upper Endoscopy (10.31.19 @ 15:14) >  FINDING Findings:    FINDING      Red blood was found in the gastric fundus and in the gastric body. Blood     FINDING      refluxed into esophagus. BLood and clot cleared but bleeding too active     FINDING      to see site. Scope withdrawn and she was intubated. I then repeated egd     FINDING      with similar findings. No active bleeding seen in antrum or duodenum and     FINDING      bleeding site seemed focused in prosimal stomach, ?proximal body given     FINDING      volume of bleeding.    < end of copied text >    --------------------------------------------------------------------------------------------

## 2019-11-02 NOTE — H&P ADULT - NSHPPHYSICALEXAM_GEN_ALL_CORE
Physical Exam:  General - NAD, weak, pale  Neuro - awake and alert, comfortable, paraplegic, follows commands  HEENT - normocephalic, atraumatic  neck - supple. no JVD  CV - Normal S1, S2, no m/r/g  lungs - CTA b/l  abdomen  - soft, non tender, non distended  ext - warm, lower extremities contracted

## 2019-11-03 NOTE — PROGRESS NOTE ADULT - SUBJECTIVE AND OBJECTIVE BOX
Nathan Ferreira, PGY-1  Internal Medicine  Pager: [phone] (NS)/ [phone] ([place]ETTA)    PROGRESS NOTE:     Patient is a 68y old  Female who presents with a chief complaint of GI Bleed (03 Nov 2019 14:59)    SUBJECTIVE / OVERNIGHT EVENTS:    Denies any current abdominal pain, last BM was this AM and states it was soft and brown. No episodes of hematochezia, melena, nausea, or vomiting noted. Also denies any hematuria, dysuria.     REVIEW OF SYSTEMS:  CONSTITUTIONAL: Quadriplegic   EYES/ENT: No visual changes;  No vertigo or throat pain   NECK: No pain or stiffness  RESPIRATORY: No cough, wheezing, hemoptysis; No shortness of breath  CARDIOVASCULAR: No chest pain or palpitations  GASTROINTESTINAL: No abdominal or epigastric pain. No nausea, vomiting, or hematemesis; No diarrhea or constipation. No melena or hematochezia.  GENITOURINARY: No dysuria, frequency or hematuria  NEUROLOGICAL: No numbness or weakness  All other review of systems is negative unless indicated above.    MEDICATIONS  (STANDING):  amLODIPine   Tablet 5 milliGRAM(s) Oral daily  atorvastatin 40 milliGRAM(s) Oral at bedtime  baclofen 20 milliGRAM(s) Oral two times a day  chlorhexidine 4% Liquid 1 Application(s) Topical <User Schedule>  DULoxetine 20 milliGRAM(s) Oral two times a day  gabapentin 600 milliGRAM(s) Oral three times a day  pantoprazole  Injectable 40 milliGRAM(s) IV Push two times a day  prednisoLONE acetate 1% Suspension 1 Drop(s) Both EYES four times a day  sodium chloride 2% Ophthalmic Solution 1 Drop(s) Both EYES daily  valACYclovir 1000 milliGRAM(s) Oral three times a day    MEDICATIONS  (PRN):  oxyCODONE    IR 10 milliGRAM(s) Oral three times a day PRN Severe Pain (7 - 10)  zolpidem 5 milliGRAM(s) Oral at bedtime PRN Insomnia    I&O's Summary  02 Nov 2019 08:01  -  03 Nov 2019 07:00  --------------------------------------------------------  IN: 600 mL / OUT: 2300 mL / NET: -1700 mL    PHYSICAL EXAM:  Vital Signs Last 24 Hrs  T(C): 36.8 (03 Nov 2019 16:37), Max: 36.9 (03 Nov 2019 00:00)  T(F): 98.3 (03 Nov 2019 16:37), Max: 98.5 (03 Nov 2019 00:00)  HR: 76 (03 Nov 2019 16:37) (65 - 83)  BP: 162/79 (03 Nov 2019 16:37) (138/66 - 175/82)  BP(mean): 115 (03 Nov 2019 16:00) (93 - 120)  RR: 18 (03 Nov 2019 16:37) (10 - 19)  SpO2: 97% (03 Nov 2019 16:37) (93% - 100%)    Constitutional: NAD, well-developed  Neck: No LAD, supple  Respiratory: CTA and P  Cardiovascular: S1 and S2, RRR, no M  Gastrointestinal: BS+, soft, NT/ND, neg HSM,  Extremities: No peripheral edema, neg clubing, cyanosis  Vascular: 2+ peripheral pulses  Neurological: A/O x 3, no focal deficits  Psychiatric: Normal mood, normal affect  Skin: No rashes    LABS:             10.5   7.55  )-----------( 280      ( 03 Nov 2019 09:33 )             31.7     11-03    136  |  105  |  7   ----------------------------<  85  4.2   |  25  |  0.35<L>    Ca    8.1<L>      03 Nov 2019 01:47  Phos  2.4     11-03  Mg     1.7     11-03    TPro  4.6<L>  /  Alb  2.7<L>  /  TBili  0.6  /  DBili  x   /  AST  15  /  ALT  11  /  AlkPhos  62  11-02    PT/INR - ( 03 Nov 2019 01:47 )   PT: 11.2 sec;   INR: 0.97 ratio    PTT - ( 03 Nov 2019 01:47 )  PTT:31.9 sec    CT Abdomen and Pelvis w/ IV Cont (10.28.19 @ 21:47)   IMPRESSION:     1. Infrarenal abdominal aortic dissection is again noted and is not   significantly changed.   2. Mild left hydronephrosis. Left renal collecting system stent. 6 x 9   mm, calculus noted within the left distal ureter, without significant   change. Tiny   left renal calculus.   3. Large amount of stool in the colon with rectal wall thickening and   adjacent perirectal infiltrative changes compatible with rectal   impaction, clinically correlate to exclude stercoral proctitis.      Case discussed w/ GI

## 2019-11-03 NOTE — PROGRESS NOTE ADULT - ASSESSMENT
#Neuro  -A&Ox3, at her baseline  -Neuro checks  -Cont to monitor  -Hx of spinal hematoma leading to paraplegia  -c/w baclofen, oxycodone and lidocaine patch  -c/w duloxetine, ambien, neurontin  -Reposition q2 hours  -HSV endophthalmitis/keratitis s/p left corneal transplant - c/w Prednisolone, and Ofloxacin eye drops   -Valtrex 1g TID    #CV  -Hx of aortic dissection s/p multiple repairs with descending aortic aneurysm  -LV ejection fraction 60-65% on TTE Aug 2019  -Has hx of HTN on Labetalol, will hold for now  -Cont to monitor  -Vasc sx consult as if patient continues to bleed, may require surgery and would like vasc input due to extensive vasc hx, as per Dr. Ambrocio  -C/w lipitor 40 qd    #Pulm  -O2 sat WNL  -Cont to monitor    #GI  -H/o of recurrent bleeds requiring transfusions. S/p multiple EGD's, capsule endoscopy, angiogram all without identifiable source of bleeding.   -s/p 7 PRBC transfusion since admission at OSH  -Surgery had been consulted at OSH regarding need for surgical intervention - partial gastrectomy?  -Will c/s Vascular sx regarding hx of aortic dissection and descending aortic aneurysm, Dr. Ambrocio would like their input in case pt requires surg during admission  -GI c/s to follow - Dr. Ambrocio's covering physician  -Trend CBC q8  -Transfuse for Hb >7  -IV PPI BID  -Cont to monitor    #/Renal - Hx of nephrolithiasis and retained stent  -CT remarkable for mild left hydronephrosis, left collecting system stent and calculus noted within the left distal ureter.  -Stent to be removed as outpatient  -Monitor I/O  -Trend renal function  -Cont to monitor    #ID - hx of ESBL Klebsiella UTI in the past  -ID consult in AM, appreciate recs  -UA at OSH with moderate LE, Ucx negative  -Obtain UA/Ucx, blood cx  -C/w meropenem 1g q8 as patient has positive UA with retained stent and previous hx of ESBL  -Cont to monitor    #Heme  -Anemic due to recurrent GI bleeds without identifiable source of bleeding  -Trend CBC q8  -Transfuse for Hb<7  -Cont to monitor    #Endo  -NPO  -FS q6    #PPx  -SCDs  -NPO  -Full code #Neuro  -A&Ox3, at her baseline  -Neuro checks  -Cont to monitor  -Hx of spinal hematoma leading to paraplegia  -c/w baclofen, oxycodone and lidocaine patch  -c/w duloxetine, ambien, neurontin  -Reposition q2 hours    #CV  -H/o aortic dissection s/p multiple repairs with descending aortic aneurysm  -LV ejection fraction 60-65% on TTE Aug 2019  -Has h/o HTN on Labetalol, will hold for now  -Cont to monitor  -Vasc consult recs no acute surgical intevention  - HLD C/w lipitor 40 qd    #Pulm  -O2 sat WNL  -Cont to monitor    #GI  -H/o recurrent bleeds requiring transfusions. S/p multiple EGD's, capsule endoscopy, angiogram all without identifiable source of bleeding.   -s/p 7 PRBC transfusion since admission at OSH  -Surgery had been consulted at OSH regarding need for surgical intervention suggesting partial gastrectomy  -Vascular surgery consult because h/o aortic dissection and descending aortic aneurysm, Dr. Ambrocio would like their input in case pt requires surg during admission, no surgical interventions needed  -GI c/s to follow - Dr. Ambrocio's covering physician  -Trend CBC q8h  -Transfuse for Hb >7  -IV PPI BID  -Cont to monitor  -NPO with clear liquids beef broth as per GI recs    #/Renal - Hx of nephrolithiasis and retained stent  -CT remarkable for mild left hydronephrosis, left collecting system stent and calculus noted within the left distal ureter - urology consult needed for calculus  -Stent to be removed as outpatient  -Monitor I/O  -Trend renal function  -Cont to monitor    #ID - h/o ESBL Klebsiella UTI in the past  -ID consult in AM, appreciate recs  -UA at OSH with moderate LE, Ucx negative  -Obtain UA/Ucx, blood cx  -C/w meropenem 1g q8 as patient has positive UA with retained stent and previous hx of ESBL  -HSV endophthalmitis/keratitis s/p left corneal transplant - c/w Prednisolone, Ofloxacin eye drops, Valtrex 1g TID  -Cont to monitor    #Heme  -Anemic due to recurrent GI bleeds without identifiable source of bleeding  -Trend CBC q8  -Transfuse for Hb<7  -Cont to monitor    #Endo  -FS q6    #PPx  -SCDs  -NPO  -Full code 69y/o F h/o aortic dissection s/p multiple repairs, spinal hematoma resulting in paraplegia (on baclofen pump), nephrolithiasis with retained stent (did not f/u to remove as per pt s/p d/c home), recurrent UTIs ESBL positive in the past likely 2/2 to pt straight cath herself, HTN, PAD, and HSV endophthalmitis/keratitis s/p left corneal transplant and extensive h/o recurrent GI bleeds with no identifiable source of bleeding who presented to [facility] on 10/28 with lethargy, weakness, malaise, found to be anemic to 5.2 and was transfused. Patient is s/p EGD and IR angiogram which have not identified source of bleeding. Patient now transferred to [facility] MICU for further management of GI bleed as well as possible surgical interventions.    #Neuro  -AAOx3, at her baseline  -Neuro checks  -Cont to monitor  -Hx of spinal hematoma leading to paraplegia  -c/w baclofen, oxycodone and lidocaine patch  -c/w duloxetine, ambien, neurontin  -Reposition q2 hours    #CV  -H/o aortic dissection s/p multiple repairs with descending aortic aneurysm  -LV ejection fraction 60-65% on TTE Aug 2019  -Has h/o HTN on Labetalol, will hold for now  -Cont to monitor  -Vasc consult recs no acute surgical intevention  - HLD C/w lipitor 40 qd    #Pulm  -O2 sat WNL  -Cont to monitor    #GI  -H/o recurrent bleeds requiring transfusions. S/p multiple EGD's, capsule endoscopy, angiogram all without identifiable source of bleeding.   -s/p 7 PRBC transfusion since admission at OSH  -Surgery had been consulted at OSH regarding need for surgical intervention suggesting partial gastrectomy  -Vascular surgery consult because h/o aortic dissection and descending aortic aneurysm, Dr. Ambrocio would like their input in case pt requires surg during admission, no surgical interventions needed  -GI c/s to follow - Dr. Ambrocio's covering physician  -Trend CBC q8h  -Transfuse for Hb >7  -IV PPI BID  -Cont to monitor  -NPO with clear liquids beef broth as per GI recs    #/Renal - Hx of nephrolithiasis and retained stent  -CT remarkable for mild left hydronephrosis, left collecting system stent and calculus noted within the left distal ureter - urology consult needed for calculus  -Stent to be removed as outpatient  -Monitor I/O  -Trend renal function  -Cont to monitor    #ID - h/o ESBL Klebsiella UTI in the past  -ID consult in AM, appreciate recs  -UA at OSH with moderate LE, Ucx negative  -Obtain UA/Ucx, blood cx  -C/w meropenem 1g q8 as patient has positive UA with retained stent and previous hx of ESBL  -HSV endophthalmitis/keratitis s/p left corneal transplant - c/w Prednisolone, Ofloxacin eye drops, Valtrex 1g TID  -Cont to monitor    #Heme  -Anemic due to recurrent GI bleeds without identifiable source of bleeding  -Trend CBC q8  -Transfuse for Hb<7  -Cont to monitor    #Endo  -FS q6    #PPx  -SCDs  -NPO  -Full code

## 2019-11-03 NOTE — PROGRESS NOTE ADULT - PROBLEM SELECTOR PLAN 2
- CIC q4-6 hours for neurogenic bladder  - Laser lithotripsy 2/2 poor outpt follow up sometime this week  -  following, recs appreciated

## 2019-11-03 NOTE — PROGRESS NOTE ADULT - PROBLEM SELECTOR PLAN 6
- hx of L Corneal transplant 2/2 endophthalmitis/keratitis  - c/w Prednisolone, Ofloxacin eye drops, Valtrex 1g TID

## 2019-11-03 NOTE — PROGRESS NOTE ADULT - ASSESSMENT
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

## 2019-11-03 NOTE — PROGRESS NOTE ADULT - PROBLEM SELECTOR PLAN 7
- DVT ppx: SCDs in setting of GI bleeding  - Diet: Clear liquid diet  - Dispo: pending clinical improvement

## 2019-11-03 NOTE — PROGRESS NOTE ADULT - PROBLEM SELECTOR PLAN 1
- c/w clear liquid diet  - c/w protonix IV BID  - Serial CBCs  - Transfuse for hgb <7  - GI following, recs appreciated

## 2019-11-03 NOTE — PROGRESS NOTE ADULT - SUBJECTIVE AND OBJECTIVE BOX
Patient is a 68y old  Female who presents with a chief complaint of GI Bleed (02 Nov 2019 21:44)      Interval Events:    REVIEW OF SYSTEMS:  Constitutional: [ ] negative [ ] fevers [ ] chills [ ] weight loss [ ] weight gain  HEENT: [ ] negative [ ] dry eyes [ ] eye irritation [ ] postnasal drip [ ] nasal congestion  CV: [ ] negative  [ ] chest pain [ ] orthopnea [ ] palpitations [ ] murmur  Resp: [ ] negative [ ] cough [ ] shortness of breath [ ] dyspnea [ ] wheezing [ ] sputum [ ] hemoptysis  GI: [ ] negative [ ] nausea [ ] vomiting [ ] diarrhea [ ] constipation [ ] abd pain [ ] dysphagia   : [ ] negative [ ] dysuria [ ] nocturia [ ] hematuria [ ] increased urinary frequency  Musculoskeletal: [ ] negative [ ] back pain [ ] myalgias [ ] arthralgias [ ] fracture  Skin: [ ] negative [ ] rash [ ] itch  Neurological: [ ] negative [ ] headache [ ] dizziness [ ] syncope [ ] weakness [ ] numbness  Psychiatric: [ ] negative [ ] anxiety [ ] depression  Endocrine: [ ] negative [ ] diabetes [ ] thyroid problem  Hematologic/Lymphatic: [ ] negative [ ] anemia [ ] bleeding problem  Allergic/Immunologic: [ ] negative [ ] itchy eyes [ ] nasal discharge [ ] hives [ ] angioedema  [ ] All other systems negative  [ ] Unable to assess ROS because ________    OBJECTIVE:  ICU Vital Signs Last 24 Hrs  T(C): 36.7 (03 Nov 2019 04:00), Max: 36.9 (03 Nov 2019 00:00)  T(F): 98 (03 Nov 2019 04:00), Max: 98.5 (03 Nov 2019 00:00)  HR: 70 (03 Nov 2019 06:00) (62 - 76)  BP: 152/71 (03 Nov 2019 06:00) (120/58 - 174/777)  BP(mean): 102 (03 Nov 2019 06:00) (73 - 114)  ABP: --  ABP(mean): --  RR: 12 (03 Nov 2019 06:00) (10 - 18)  SpO2: 97% (03 Nov 2019 06:00) (93% - 100%)        11-02 @ 08:01  -  11-03 @ 07:00  --------------------------------------------------------  IN: 600 mL / OUT: 2300 mL / NET: -1700 mL      CAPILLARY BLOOD GLUCOSE          PHYSICAL EXAM:  General:   HEENT:   Lymph Nodes:  Neck:   Respiratory:   Cardiovascular:   Abdomen:   Extremities:   Skin:   Neurological:  Psychiatry:    LINES:    HOSPITAL MEDICATIONS:  Standing Meds:  amLODIPine   Tablet 5 milliGRAM(s) Oral daily  atorvastatin 40 milliGRAM(s) Oral at bedtime  baclofen 20 milliGRAM(s) Oral two times a day  chlorhexidine 4% Liquid 1 Application(s) Topical <User Schedule>  DULoxetine 20 milliGRAM(s) Oral two times a day  gabapentin 600 milliGRAM(s) Oral three times a day  meropenem  IVPB 1000 milliGRAM(s) IV Intermittent every 8 hours  pantoprazole  Injectable 40 milliGRAM(s) IV Push two times a day  prednisoLONE acetate 1% Suspension 1 Drop(s) Both EYES four times a day  sodium chloride 2% Ophthalmic Solution 1 Drop(s) Both EYES daily  valACYclovir 1000 milliGRAM(s) Oral three times a day      PRN Meds:  oxyCODONE    IR 10 milliGRAM(s) Oral three times a day PRN  zolpidem 5 milliGRAM(s) Oral at bedtime PRN      LABS:                        9.5    7.25  )-----------( 259      ( 03 Nov 2019 01:47 )             28.9     Hgb Trend: 9.5<--, 8.6<--, 7.4<--, 8.0<--, 8.3<--  11-03    136  |  105  |  7   ----------------------------<  85  4.2   |  25  |  0.35<L>    Ca    8.1<L>      03 Nov 2019 01:47  Phos  2.4     11-03  Mg     1.7     11-03    TPro  4.6<L>  /  Alb  2.7<L>  /  TBili  0.6  /  DBili  x   /  AST  15  /  ALT  11  /  AlkPhos  62  11-02    Creatinine Trend: 0.35<--, 0.35<--, 0.30<--, 0.35<--, 0.28<--, 0.40<--  PT/INR - ( 03 Nov 2019 01:47 )   PT: 11.2 sec;   INR: 0.97 ratio         PTT - ( 03 Nov 2019 01:47 )  PTT:31.9 sec          MICROBIOLOGY:     Culture - Urine (collected 31 Oct 2019 14:51)  Source: .Urine Catheterized  Final Report (01 Nov 2019 19:58):    <10,000 CFU/mL Normal Urogenital Zora      RADIOLOGY:  [ ] Reviewed and interpreted by me    EKG: Patient is a 68y old  Female who presents with a chief complaint of GI Bleed (02 Nov 2019 21:44)      Interval Events:    No acute events overnight. Patient seen and examined at bedside this AM and requesting PT since she has not gotten out of bed. Denies fevers, chills, nausea, vomiting, abdominal pain, lightheadedness, dizziness.    REVIEW OF SYSTEMS:  Constitutional: [x ] negative [ ] fevers [ ] chills [ ] weight loss [ ] weight gain  HEENT: [x ] negative [ ] dry eyes [ ] eye irritation [ ] postnasal drip [ ] nasal congestion  CV: [x ] negative  [ ] chest pain [ ] orthopnea [ ] palpitations [ ] murmur  Resp: [ x] negative [ ] cough [ ] shortness of breath [ ] dyspnea [ ] wheezing [ ] sputum [ ] hemoptysis  GI: [x ] negative [ ] nausea [ ] vomiting [ ] diarrhea [ ] constipation [ ] abd pain [ ] dysphagia   : [x] negative [ ] dysuria [ ] nocturia [ ] hematuria [ ] increased urinary frequency  Musculoskeletal: [x ] negative [ ] back pain [ ] myalgias [ ] arthralgias [ ] fracture  Skin: [x ] negative [ ] rash [ ] itch  Neurological: [x ] negative [ ] headache [ ] dizziness [ ] syncope [ ] weakness [ ] numbness  Psychiatric: [x ] negative [ ] anxiety [ ] depression  Endocrine: [x ] negative [ ] diabetes [ ] thyroid problem  Hematologic/Lymphatic: [x ] negative [ ] anemia [ ] bleeding problem  Allergic/Immunologic: [x ] negative [ ] itchy eyes [ ] nasal discharge [ ] hives [ ] angioedema  [ ] All other systems negative  [ ] Unable to assess ROS because ________    OBJECTIVE:  ICU Vital Signs Last 24 Hrs  T(C): 36.7 (03 Nov 2019 04:00), Max: 36.9 (03 Nov 2019 00:00)  T(F): 98 (03 Nov 2019 04:00), Max: 98.5 (03 Nov 2019 00:00)  HR: 70 (03 Nov 2019 06:00) (62 - 76)  BP: 152/71 (03 Nov 2019 06:00) (120/58 - 174/777)  BP(mean): 102 (03 Nov 2019 06:00) (73 - 114)  ABP: --  ABP(mean): --  RR: 12 (03 Nov 2019 06:00) (10 - 18)  SpO2: 97% (03 Nov 2019 06:00) (93% - 100%)        11-02 @ 08:01  -  11-03 @ 07:00  --------------------------------------------------------  IN: 600 mL / OUT: 2300 mL / NET: -1700 mL      CAPILLARY BLOOD GLUCOSE          PHYSICAL EXAM:  Physical Exam:     General - NAD, weak, pale  Neuro - awake and alert, comfortable, paraplegic, follows commands  HEENT - normocephalic, atraumatic, neck supple. no JVD  CV - Normal S1, S2, no m/r/g  lungs - CTA b/l  Abdomen  - soft, non tender, non distended  Ext - warm, lower extremities contracted      LINES:    HOSPITAL MEDICATIONS:  Standing Meds:  amLODIPine   Tablet 5 milliGRAM(s) Oral daily  atorvastatin 40 milliGRAM(s) Oral at bedtime  baclofen 20 milliGRAM(s) Oral two times a day  chlorhexidine 4% Liquid 1 Application(s) Topical <User Schedule>  DULoxetine 20 milliGRAM(s) Oral two times a day  gabapentin 600 milliGRAM(s) Oral three times a day  meropenem  IVPB 1000 milliGRAM(s) IV Intermittent every 8 hours  pantoprazole  Injectable 40 milliGRAM(s) IV Push two times a day  prednisoLONE acetate 1% Suspension 1 Drop(s) Both EYES four times a day  sodium chloride 2% Ophthalmic Solution 1 Drop(s) Both EYES daily  valACYclovir 1000 milliGRAM(s) Oral three times a day      PRN Meds:  oxyCODONE    IR 10 milliGRAM(s) Oral three times a day PRN  zolpidem 5 milliGRAM(s) Oral at bedtime PRN      LABS:                        9.5    7.25  )-----------( 259      ( 03 Nov 2019 01:47 )             28.9     Hgb Trend: 9.5<--, 8.6<--, 7.4<--, 8.0<--, 8.3<--  11-03    136  |  105  |  7   ----------------------------<  85  4.2   |  25  |  0.35<L>    Ca    8.1<L>      03 Nov 2019 01:47  Phos  2.4     11-03  Mg     1.7     11-03    TPro  4.6<L>  /  Alb  2.7<L>  /  TBili  0.6  /  DBili  x   /  AST  15  /  ALT  11  /  AlkPhos  62  11-02    Creatinine Trend: 0.35<--, 0.35<--, 0.30<--, 0.35<--, 0.28<--, 0.40<--  PT/INR - ( 03 Nov 2019 01:47 )   PT: 11.2 sec;   INR: 0.97 ratio         PTT - ( 03 Nov 2019 01:47 )  PTT:31.9 sec          MICROBIOLOGY:     Culture - Urine (collected 31 Oct 2019 14:51)  Source: .Urine Catheterized  Final Report (01 Nov 2019 19:58):    <10,000 CFU/mL Normal Urogenital Zora      RADIOLOGY:  [ ] Reviewed and interpreted by me    EKG:

## 2019-11-03 NOTE — PROGRESS NOTE ADULT - PROBLEM SELECTOR PLAN 4
- Hx of aortic dissection s/p multiple repairs   - LV EF 60-65% on TTE Aug 2019  - Vasc consulted, no acute surgical intervention  - c/w lipitor 40mg QHS

## 2019-11-03 NOTE — PROGRESS NOTE ADULT - PROBLEM SELECTOR PLAN 5
- Hx of ESBL klebsiella UTI   - Treated w/ meropenem at OSH  - Urine Cx negative  - D/c abx  - Continue to monitor  - CIC as above

## 2019-11-03 NOTE — CONSULT NOTE ADULT - ASSESSMENT
1. Left ureteral stone with hydronephrosis s/p left ureteral stent 8/12/19  2. Neurogenic bladder    Plan:    -had lengthy discussion with patient regarding her left ureteral stone/stent. She understands that the ureteral stone remains present and thus the stent cannot be removed until the stone is treated. She also understands that stents are only temporary and need to either be removed or exchanged q3-6months to prevent encrustation and recurrent infections.   -no acute surgical intervention indicated at this time  -will try to schedule for Left ureteroscopy, laser lithotripsy, stone extraction this week while she remains inpatient due to difficulty of following up as outpatient. She is amenable.   -no further need for IV abx from  standpoint given recent negative urine culture  -CIC q4-6h for neurogenic bladder  -care per ICU/medicine/GI  -will follow      Thank you for allowing me to assist in the care of this patient  Please feel free to call with any questions/concerns    Jorge Lares MD  [phone]

## 2019-11-03 NOTE — PROGRESS NOTE ADULT - PROBLEM SELECTOR PLAN 3
- Neuro checks  - Hx of spinal hematoma   - c/w baclofen, oxycodone, and lidocaine patch  - c/w duloxetine, Ambien, Neurontin  - Repositin q2 hours

## 2019-11-03 NOTE — CONSULT NOTE ADULT - ASSESSMENT
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  has had at least 10 endoscopic procedcures  currently patient is stable  continue BID protonix IV  clear liquid diet  if any recurrent bleeding please call to discuss urgent EGD  serial cbc  appreciate surgical recs.   Dr. Ambrocio to resume care tomorrow.

## 2019-11-04 NOTE — PHYSICAL THERAPY INITIAL EVALUATION ADULT - ACTIVE RANGE OF MOTION EXAMINATION, REHAB EVAL
Pt with decreased hip flexion, knee extension <50% of motion L LE, Pt with decreased hip flexion, knee extension, dorsliflexion <25% of motion R LE,/bilateral upper extremity Active ROM was WFL (within functional limits)

## 2019-11-04 NOTE — DIETITIAN INITIAL EVALUATION ADULT. - ADD RECOMMEND
1) Continue to monitor weight, lab values, and diet tolerance. 2) Encouraged pt to increase po intake of meals as tolerated and discussed importance of adequate nutrition intake. 3) Pt declined need for supplements as she is eating well with foods brought from outside by [facility]. Discussed with Team 4 regarding pt's dislike for current diet consistency/meal choices. Team 4 to confirm with GI regarding diet change to low fiber.

## 2019-11-04 NOTE — PROGRESS NOTE ADULT - ASSESSMENT
1. Left ureteral stone with hydronephrosis s/p left ureteral stent 8/12/19  2. Neurogenic bladder    Plan:    -scheduled for left URS/LL/stone extraction, stent exchange on 11/7 at 3:30pm. If she remains stable, will plan to move forward with surgery while inpatient.    -NPO after midnight on Wednesday, 11/6  -no further need for IV abx from  standpoint given recent negative urine culture  -CIC q4-6h for neurogenic bladder  -care per medicine/GI      Thank you for allowing me to assist in the care of this patient  Please feel free to call with any questions/concerns    Jorge Lares MD  C:  [phone]

## 2019-11-04 NOTE — DIETITIAN INITIAL EVALUATION ADULT. - REASON INDICATOR FOR ASSESSMENT
Nutrition consult received for pressure ulcer stage 2. Source: EMR, patient, HHA at bedside, Team 4, RN, multiple previous RD notes

## 2019-11-04 NOTE — DIETITIAN INITIAL EVALUATION ADULT. - PERTINENT MEDS FT
MEDICATIONS  (STANDING):  amLODIPine   Tablet 5 milliGRAM(s) Oral daily  atorvastatin 40 milliGRAM(s) Oral at bedtime  baclofen 20 milliGRAM(s) Oral two times a day  chlorhexidine 4% Liquid 1 Application(s) Topical <User Schedule>  DULoxetine 20 milliGRAM(s) Oral two times a day  gabapentin 600 milliGRAM(s) Oral three times a day  labetalol 100 milliGRAM(s) Oral every 12 hours  pantoprazole  Injectable 40 milliGRAM(s) IV Push two times a day  prednisoLONE acetate 1% Suspension 1 Drop(s) Both EYES four times a day  sodium chloride 2% Ophthalmic Solution 1 Drop(s) Both EYES daily  valACYclovir 1000 milliGRAM(s) Oral three times a day    MEDICATIONS  (PRN):  oxyCODONE    IR 10 milliGRAM(s) Oral three times a day PRN Severe Pain (7 - 10)  zolpidem 5 milliGRAM(s) Oral at bedtime PRN Insomnia

## 2019-11-04 NOTE — DIETITIAN INITIAL EVALUATION ADULT. - ENERGY NEEDS
Height: 67 inches, Weight: 120 pounds  BMI: 18.8 kg/m2 IBW: 148 pounds (+/-10%), %IBW: 81%  Pertinent Info: Per chart, 67 y/o Female with PMH of aortic dissection s/p multiple repairs, spinal hematoma resulting in paraplegia, nephrolithiasis with retained stent, HTN, recurrent GI bleeds with no identifiable source of bleeding admitted with anemia and unidentified GI bleed possibly 2/2 Dieulafoy lesion, no GI bleed x 3 days per GI note, possible partial gastrectomy.  No edema, no pressure ulcers noted at this time- per RN, likely more IAD than pressure injury.

## 2019-11-04 NOTE — PHYSICAL THERAPY INITIAL EVALUATION ADULT - IMPAIRMENTS FOUND, PT EVAL
neuromotor development and sensory integration/muscle strength/gait, locomotion, and balance gait, locomotion, and balance/ROM/neuromotor development and sensory integration/muscle strength

## 2019-11-04 NOTE — PROGRESS NOTE ADULT - SUBJECTIVE AND OBJECTIVE BOX
INTERVAL HPI/OVERNIGHT EVENTS:    Seen and examined.  Transferred over for ongoing care.  No evidence of Gi bleed for the last 3 days.  H/H has remained stable.  She denies any symptoms prior to the GI bleed (no palpitations, SOB, abdominal pain).  Is 'very hungry." No diarrhea.  No nausea or emesis.    MEDICATIONS  (STANDING):  amLODIPine   Tablet 5 milliGRAM(s) Oral daily  atorvastatin 40 milliGRAM(s) Oral at bedtime  baclofen 20 milliGRAM(s) Oral two times a day  chlorhexidine 4% Liquid 1 Application(s) Topical <User Schedule>  DULoxetine 20 milliGRAM(s) Oral two times a day  gabapentin 600 milliGRAM(s) Oral three times a day  labetalol 100 milliGRAM(s) Oral every 12 hours  pantoprazole  Injectable 40 milliGRAM(s) IV Push two times a day  prednisoLONE acetate 1% Suspension 1 Drop(s) Both EYES four times a day  sodium chloride 2% Ophthalmic Solution 1 Drop(s) Both EYES daily  valACYclovir 1000 milliGRAM(s) Oral three times a day    MEDICATIONS  (PRN):  oxyCODONE    IR 10 milliGRAM(s) Oral three times a day PRN Severe Pain (7 - 10)  zolpidem 5 milliGRAM(s) Oral at bedtime PRN Insomnia      Allergies    No Known Allergies    Intolerances        Review of Systems:  Denies fever or chills.  No abdominal pain.  No palpitation.     Vital Signs Last 24 Hrs  T(C): 36.7 (04 Nov 2019 04:27), Max: 36.8 (03 Nov 2019 12:00)  T(F): 98 (04 Nov 2019 04:27), Max: 98.3 (03 Nov 2019 12:00)  HR: 93 (04 Nov 2019 04:27) (69 - 93)  BP: 151/83 (04 Nov 2019 04:27) (143/66 - 170/85)  BP(mean): 115 (03 Nov 2019 16:00) (95 - 120)  RR: 18 (04 Nov 2019 04:27) (12 - 19)  SpO2: 97% (04 Nov 2019 04:27) (95% - 100%)    PHYSICAL EXAM:  Constitutional: NAD, well-developed  Neck: No LAD, supple  Respiratory: CTAB  Cardiovascular: S1 and S2, RRR,   Gastrointestinal: BS+, soft, NT/ND, neg HSM,      LABS:                        9.7    6.93  )-----------( 232      ( 04 Nov 2019 01:27 )             29.2     11-04    139  |  102  |  5<L>  ----------------------------<  105<H>  3.7   |  27  |  0.46<L>    Ca    8.4      04 Nov 2019 06:55  Phos  3.0     11-04  Mg     1.9     11-04    TPro  4.8<L>  /  Alb  2.7<L>  /  TBili  0.6  /  DBili  x   /  AST  15  /  ALT  11  /  AlkPhos  78  11-04    PT/INR - ( 03 Nov 2019 01:47 )   PT: 11.2 sec;   INR: 0.97 ratio         PTT - ( 03 Nov 2019 01:47 )  PTT:31.9 sec    LIVER FUNCTIONS - ( 04 Nov 2019 06:55 )  Alb: 2.7 g/dL / Pro: 4.8 g/dL / ALK PHOS: 78 U/L / ALT: 11 U/L / AST: 15 U/L / GGT: x             RADIOLOGY & ADDITIONAL TESTS:

## 2019-11-04 NOTE — PHYSICAL THERAPY INITIAL EVALUATION ADULT - DISCHARGE DISPOSITION, PT EVAL
Subacute Rehab-*Pt declining, requesting home*-If pt to return home, home with home PT to strengthen B LE, improve transfers and overall funcitonal mobility. Recommend WC for mobility and supervision/assist for all transfers/functional mobility at this time. Pt has  and HHA from the hours of 9am-1pm/6 days per week who are able to assist with ADLS./rehabilitation facility Subacute Rehab - *Pt declining, requesting home, if pt to return home, recommend home with home PT to strengthen B LE, improve transfers and overall functional mobility. Recommend W/C for mobility and assist for all transfers/functional mobility at this time. Pt has  and HHA from the hours of 9am-1pm/6 days per week who are able to assist./rehabilitation facility

## 2019-11-04 NOTE — PROGRESS NOTE ADULT - SUBJECTIVE AND OBJECTIVE BOX
Nathan Ferreira, PGY-1  Internal Medicine  Pager: [phone] (NS)/ [phone] ([place])    PROGRESS NOTE:     Patient is a 68y old  Female who presents with a chief complaint of GI Bleed (03 Nov 2019 14:59)    SUBJECTIVE / OVERNIGHT EVENTS:    Denies any current abdominal pain, Last BM yesterday and states it was soft and brown. No episodes of hematochezia, melena, nausea, or vomiting noted. Also denies any hematuria, dysuria.     REVIEW OF SYSTEMS:  CONSTITUTIONAL: Quadriplegic   EYES/ENT: No visual changes;  No vertigo or throat pain   NECK: No pain or stiffness  RESPIRATORY: No cough, wheezing, hemoptysis; No shortness of breath  CARDIOVASCULAR: No chest pain or palpitations  GASTROINTESTINAL: No abdominal or epigastric pain. No nausea, vomiting, or hematemesis; No diarrhea or constipation. No melena or hematochezia.  GENITOURINARY: No dysuria, frequency or hematuria  NEUROLOGICAL: No numbness or weakness  All other review of systems is negative unless indicated above.    MEDICATIONS  (STANDING):  amLODIPine   Tablet 5 milliGRAM(s) Oral daily  atorvastatin 40 milliGRAM(s) Oral at bedtime  baclofen 20 milliGRAM(s) Oral two times a day  chlorhexidine 4% Liquid 1 Application(s) Topical <User Schedule>  DULoxetine 20 milliGRAM(s) Oral two times a day  gabapentin 600 milliGRAM(s) Oral three times a day  pantoprazole  Injectable 40 milliGRAM(s) IV Push two times a day  prednisoLONE acetate 1% Suspension 1 Drop(s) Both EYES four times a day  sodium chloride 2% Ophthalmic Solution 1 Drop(s) Both EYES daily  valACYclovir 1000 milliGRAM(s) Oral three times a day    MEDICATIONS  (PRN):  oxyCODONE    IR 10 milliGRAM(s) Oral three times a day PRN Severe Pain (7 - 10)  zolpidem 5 milliGRAM(s) Oral at bedtime PRN Insomnia    I&O's Summary  03 Nov 2019 07:01  -  04 Nov 2019 07:00  --------------------------------------------------------  IN: 650 mL / OUT: 751 mL / NET: -101 mL      PHYSICAL EXAM:  Vital Signs Last 24 Hrs  T(C): 36.7 (04 Nov 2019 04:27), Max: 36.8 (03 Nov 2019 12:00)  T(F): 98 (04 Nov 2019 04:27), Max: 98.3 (03 Nov 2019 12:00)  HR: 93 (04 Nov 2019 04:27) (69 - 93)  BP: 151/83 (04 Nov 2019 04:27) (143/66 - 170/85)  BP(mean): 115 (03 Nov 2019 16:00) (95 - 120)  RR: 18 (04 Nov 2019 04:27) (12 - 19)  SpO2: 97% (04 Nov 2019 04:27) (95% - 100%)    Constitutional: NAD, well-developed  Neck: No LAD, supple  Respiratory: CTABL  Cardiovascular: S1 and S2, RRR, no M/R/G  Gastrointestinal: BS+, soft, NT/ND, neg HSM. Baclofen pump in RLQ  Extremities: No peripheral edema, neg clubbing, cyanosis  Vascular: 2+ peripheral pulses  Neurological: A/O x 3, no focal deficits  Psychiatric: Normal mood, normal affect  Skin: No rashes    LABS:             11-04    139  |  102  |  5<L>  ----------------------------<  105<H>  3.7   |  27  |  0.46<L>    Ca    8.4      04 Nov 2019 06:55  Phos  3.0     11-04  Mg     1.9     11-04    TPro  4.8<L>  /  Alb  2.7<L>  /  TBili  0.6  /  DBili  x   /  AST  15  /  ALT  11  /  AlkPhos  78  11-04    LIVER FUNCTIONS - ( 04 Nov 2019 06:55 )  Alb: 2.7 g/dL / Pro: 4.8 g/dL / ALK PHOS: 78 U/L / ALT: 11 U/L / AST: 15 U/L / GGT: x           PT/INR - ( 03 Nov 2019 01:47 )   PT: 11.2 sec;   INR: 0.97 ratio    PTT - ( 03 Nov 2019 01:47 )  PTT:31.9 sec    CT Abdomen and Pelvis w/ IV Cont (10.28.19 @ 21:47)   IMPRESSION:     1. Infrarenal abdominal aortic dissection is again noted and is not   significantly changed.   2. Mild left hydronephrosis. Left renal collecting system stent. 6 x 9   mm, calculus noted within the left distal ureter, without significant   change. Tiny   left renal calculus.   3. Large amount of stool in the colon with rectal wall thickening and   adjacent perirectal infiltrative changes compatible with rectal   impaction, clinically correlate to exclude stercoral proctitis.      Case discussed w/ GI Nathan Ferreira, PGY-1  Internal Medicine  Pager: [phone] (NS)/ [phone] ([place])    PROGRESS NOTE:     Patient is a 68y old  Female who presents with a chief complaint of GI Bleed (03 Nov 2019 14:59)    SUBJECTIVE / OVERNIGHT EVENTS:    Denies any current abdominal pain, Last BM yesterday and states it was soft and brown. No episodes of hematochezia, melena, nausea, or vomiting noted. Also denies any hematuria, dysuria.     REVIEW OF SYSTEMS:  CONSTITUTIONAL: Quadriplegic   EYES/ENT: No visual changes;  No vertigo or throat pain   NECK: No pain or stiffness  RESPIRATORY: No cough, wheezing, hemoptysis; No shortness of breath  CARDIOVASCULAR: No chest pain or palpitations  GASTROINTESTINAL: No abdominal or epigastric pain. No nausea, vomiting, or hematemesis; No diarrhea or constipation. No melena or hematochezia.  GENITOURINARY: No dysuria, frequency or hematuria  NEUROLOGICAL: No numbness or weakness  All other review of systems is negative unless indicated above.    MEDICATIONS  (STANDING):  amLODIPine   Tablet 5 milliGRAM(s) Oral daily  atorvastatin 40 milliGRAM(s) Oral at bedtime  baclofen 20 milliGRAM(s) Oral two times a day  chlorhexidine 4% Liquid 1 Application(s) Topical <User Schedule>  DULoxetine 20 milliGRAM(s) Oral two times a day  gabapentin 600 milliGRAM(s) Oral three times a day  pantoprazole  Injectable 40 milliGRAM(s) IV Push two times a day  prednisoLONE acetate 1% Suspension 1 Drop(s) Both EYES four times a day  sodium chloride 2% Ophthalmic Solution 1 Drop(s) Both EYES daily  valACYclovir 1000 milliGRAM(s) Oral three times a day    MEDICATIONS  (PRN):  oxyCODONE    IR 10 milliGRAM(s) Oral three times a day PRN Severe Pain (7 - 10)  zolpidem 5 milliGRAM(s) Oral at bedtime PRN Insomnia    I&O's Summary  03 Nov 2019 07:01  -  04 Nov 2019 07:00  --------------------------------------------------------  IN: 650 mL / OUT: 751 mL / NET: -101 mL    PHYSICAL EXAM:  Vital Signs Last 24 Hrs  T(C): 36.7 (04 Nov 2019 04:27), Max: 36.8 (03 Nov 2019 12:00)  T(F): 98 (04 Nov 2019 04:27), Max: 98.3 (03 Nov 2019 12:00)  HR: 93 (04 Nov 2019 04:27) (69 - 93)  BP: 151/83 (04 Nov 2019 04:27) (143/66 - 170/85)  BP(mean): 115 (03 Nov 2019 16:00) (95 - 120)  RR: 18 (04 Nov 2019 04:27) (12 - 19)  SpO2: 97% (04 Nov 2019 04:27) (95% - 100%)    Constitutional: NAD, well-developed  Neck: No LAD, supple  Respiratory: CTABL  Cardiovascular: S1 and S2, systolic ejection murmur II/IV crescendo decrescendo heard best at R sternal border, no M/R/G  Gastrointestinal: BS+, soft, NT/ND, neg HSM. Baclofen pump in RLQ  Extremities: No peripheral edema, neg clubbing, cyanosis  Vascular: 2+ peripheral pulses  Neurological: A/O x 3, no focal deficits  Psychiatric: Normal mood, normal affect  Skin: No rashes    LABS:                      9.4    5.80  )-----------( 247      ( 04 Nov 2019 08:02 )             29.5     11-04    139  |  102  |  5<L>  ----------------------------<  105<H>  3.7   |  27  |  0.46<L>    Ca    8.4      04 Nov 2019 06:55  Phos  3.0     11-04  Mg     1.9     11-04    TPro  4.8<L>  /  Alb  2.7<L>  /  TBili  0.6  /  DBili  x   /  AST  15  /  ALT  11  /  AlkPhos  78  11-04    LIVER FUNCTIONS - ( 04 Nov 2019 06:55 )  Alb: 2.7 g/dL / Pro: 4.8 g/dL / ALK PHOS: 78 U/L / ALT: 11 U/L / AST: 15 U/L / GGT: x           PT/INR - ( 03 Nov 2019 01:47 )   PT: 11.2 sec;   INR: 0.97 ratio    PTT - ( 03 Nov 2019 01:47 )  PTT:31.9 sec    CT Abdomen and Pelvis w/ IV Cont (10.28.19 @ 21:47)   IMPRESSION:     1. Infrarenal abdominal aortic dissection is again noted and is not   significantly changed.   2. Mild left hydronephrosis. Left renal collecting system stent. 6 x 9   mm, calculus noted within the left distal ureter, without significant   change. Tiny   left renal calculus.   3. Large amount of stool in the colon with rectal wall thickening and   adjacent perirectal infiltrative changes compatible with rectal   impaction, clinically correlate to exclude stercoral proctitis.      Case discussed w/ GI Nathan Ferreira, PGY-1  Internal Medicine  Pager: [phone] (NS)/ [phone] ([place])    PROGRESS NOTE:     Patient is a 68y old  Female who presents with a chief complaint of GI Bleed (03 Nov 2019 14:59)    SUBJECTIVE / OVERNIGHT EVENTS:    Denies any current abdominal pain, Last BM yesterday and states it was soft and brown. No episodes of hematochezia, melena, nausea, or vomiting noted. Also denies any hematuria, dysuria.       MEDICATIONS  (STANDING):  amLODIPine   Tablet 5 milliGRAM(s) Oral daily  atorvastatin 40 milliGRAM(s) Oral at bedtime  baclofen 20 milliGRAM(s) Oral two times a day  chlorhexidine 4% Liquid 1 Application(s) Topical <User Schedule>  DULoxetine 20 milliGRAM(s) Oral two times a day  gabapentin 600 milliGRAM(s) Oral three times a day  pantoprazole  Injectable 40 milliGRAM(s) IV Push two times a day  prednisoLONE acetate 1% Suspension 1 Drop(s) Both EYES four times a day  sodium chloride 2% Ophthalmic Solution 1 Drop(s) Both EYES daily  valACYclovir 1000 milliGRAM(s) Oral three times a day    MEDICATIONS  (PRN):  oxyCODONE    IR 10 milliGRAM(s) Oral three times a day PRN Severe Pain (7 - 10)  zolpidem 5 milliGRAM(s) Oral at bedtime PRN Insomnia    I&O's Summary  03 Nov 2019 07:01  -  04 Nov 2019 07:00  --------------------------------------------------------  IN: 650 mL / OUT: 751 mL / NET: -101 mL    PHYSICAL EXAM:  Vital Signs Last 24 Hrs  T(C): 36.7 (04 Nov 2019 04:27), Max: 36.8 (03 Nov 2019 12:00)  T(F): 98 (04 Nov 2019 04:27), Max: 98.3 (03 Nov 2019 12:00)  HR: 93 (04 Nov 2019 04:27) (69 - 93)  BP: 151/83 (04 Nov 2019 04:27) (143/66 - 170/85)  BP(mean): 115 (03 Nov 2019 16:00) (95 - 120)  RR: 18 (04 Nov 2019 04:27) (12 - 19)  SpO2: 97% (04 Nov 2019 04:27) (95% - 100%)    Constitutional: NAD, well-developed  Neck: No LAD, supple  Respiratory: CTABL  Cardiovascular: S1 and S2, systolic ejection murmur II/IV crescendo decrescendo heard best at R sternal border, no M/R/G  Gastrointestinal: BS+, soft, NT/ND, neg HSM. Baclofen pump in RLQ  Extremities: No peripheral edema, neg clubbing, cyanosis  Vascular: 2+ peripheral pulses  Neurological: A/O x 3, no focal deficits  Psychiatric: Normal mood, normal affect  Skin: No rashes    LABS:                      9.4    5.80  )-----------( 247      ( 04 Nov 2019 08:02 )             29.5     11-04    139  |  102  |  5<L>  ----------------------------<  105<H>  3.7   |  27  |  0.46<L>    Ca    8.4      04 Nov 2019 06:55  Phos  3.0     11-04  Mg     1.9     11-04    TPro  4.8<L>  /  Alb  2.7<L>  /  TBili  0.6  /  DBili  x   /  AST  15  /  ALT  11  /  AlkPhos  78  11-04    LIVER FUNCTIONS - ( 04 Nov 2019 06:55 )  Alb: 2.7 g/dL / Pro: 4.8 g/dL / ALK PHOS: 78 U/L / ALT: 11 U/L / AST: 15 U/L / GGT: x           PT/INR - ( 03 Nov 2019 01:47 )   PT: 11.2 sec;   INR: 0.97 ratio    PTT - ( 03 Nov 2019 01:47 )  PTT:31.9 sec    CT Abdomen and Pelvis w/ IV Cont (10.28.19 @ 21:47)   IMPRESSION:     1. Infrarenal abdominal aortic dissection is again noted and is not   significantly changed.   2. Mild left hydronephrosis. Left renal collecting system stent. 6 x 9   mm, calculus noted within the left distal ureter, without significant   change. Tiny   left renal calculus.   3. Large amount of stool in the colon with rectal wall thickening and   adjacent perirectal infiltrative changes compatible with rectal   impaction, clinically correlate to exclude stercoral proctitis.      Case discussed w/ GI

## 2019-11-04 NOTE — PHYSICAL THERAPY INITIAL EVALUATION ADULT - IMPAIRMENTS CONTRIBUTING IMPAIRED BED MOBILITY, REHAB EVAL
decreased strength/impaired postural control decreased strength/impaired postural control/impaired balance/decreased ROM

## 2019-11-04 NOTE — PHYSICAL THERAPY INITIAL EVALUATION ADULT - BALANCE DISTURBANCE, IDENTIFIED IMPAIRMENT CONTRIBUTE, REHAB EVAL
impaired postural control/decreased strength decreased strength/impaired postural control/decreased ROM

## 2019-11-04 NOTE — PROGRESS NOTE ADULT - SUBJECTIVE AND OBJECTIVE BOX
Patient seen and examined at bedside  No acute events overnight  Out of ICU  No hematuria  No f/c/n/v    Endorses dark stools    Medications  amLODIPine   Tablet 5 milliGRAM(s) Oral daily  atorvastatin 40 milliGRAM(s) Oral at bedtime  baclofen 20 milliGRAM(s) Oral two times a day  chlorhexidine 4% Liquid 1 Application(s) Topical <User Schedule>  DULoxetine 20 milliGRAM(s) Oral two times a day  gabapentin 600 milliGRAM(s) Oral three times a day  labetalol 100 milliGRAM(s) Oral every 12 hours  oxyCODONE    IR 10 milliGRAM(s) Oral three times a day PRN  pantoprazole  Injectable 40 milliGRAM(s) IV Push two times a day  prednisoLONE acetate 1% Suspension 1 Drop(s) Both EYES four times a day  sodium chloride 2% Ophthalmic Solution 1 Drop(s) Both EYES daily  valACYclovir 1000 milliGRAM(s) Oral three times a day  zolpidem 5 milliGRAM(s) Oral at bedtime PRN      ROS  General: No fevers, chills, night sweats, fatigue, malaise  Skin: no new skin lesions, hair changes, prutitus  Ophthalmologic: No eye pain,  swelling,   ENMT: No hearing changes,  ear pain,  nasal congestion, sinus pain  Respiratory and Thorax: No cough, sputum, dyspnea, wheezing  Cardiovascular: No chest pain, SOB, PND, dyspnea on exertion, orthopnea  Gastrointestinal:	+?melena  Genitourinary: see above  Neurological: No syncope, loss of consciousness, headache, dizziness  Psychiatric:  No SI/HI/AH/VH.  Hematology/Lymphatics:	No bruising, lymphadenopathy  Endocrine: No temperature intolerance    Vital Signs Last 24 Hrs  T(C): 36.7 (04 Nov 2019 12:27), Max: 36.8 (03 Nov 2019 16:37)  T(F): 98.1 (04 Nov 2019 12:27), Max: 98.3 (03 Nov 2019 16:37)  HR: 88 (04 Nov 2019 12:27) (76 - 93)  BP: 113/70 (04 Nov 2019 12:27) (110/68 - 162/79)  BP(mean): --  RR: 18 (04 Nov 2019 12:27) (18 - 18)  SpO2: 96% (04 Nov 2019 12:27) (95% - 97%)    PHYSICAL EXAM:  Constitutional: NAD, lying in bed comfortably   Eyes: EOMI  Neck: neck supple  Back: no CVA tenderness bilaterally  Respiratory: no labored breathing  Cardiovascular: no peripheral edema, cyanosis, or pallor. Extremities are warm and well perfused.   Gastrointestinal: soft, non-tender, non-distended  Genitourinary: bladder non-palpable  Extremities: contracted lower extremities.   Neurological:  A&O x3  Psychiatric: normal mood and affect        I/O    11-03-19 @ 07:01  -  11-04-19 @ 07:00  --------------------------------------------------------  IN: 0 mL / OUT: 751 mL / NET: -751 mL    11-04-19 @ 07:01  -  11-04-19 @ 16:03  --------------------------------------------------------  IN: 0 mL / OUT: 600 mL / NET: -600 mL        Labs:  CBC Full  -  ( 04 Nov 2019 08:02 )  WBC Count : 5.80 K/uL  Hemoglobin : 9.4 g/dL  Hematocrit : 29.5 %  Platelet Count - Automated : 247 K/uL  Mean Cell Volume : 86.8 fl  Mean Cell Hemoglobin : 27.6 pg  Mean Cell Hemoglobin Concentration : 31.9 gm/dL  Auto Neutrophil # : x  Auto Lymphocyte # : x  Auto Monocyte # : x  Auto Eosinophil # : x  Auto Basophil # : x  Auto Neutrophil % : x  Auto Lymphocyte % : x  Auto Monocyte % : x  Auto Eosinophil % : x  Auto Basophil % : x    11-04    139  |  102  |  5<L>  ----------------------------<  105<H>  3.7   |  27  |  0.46<L>    Ca    8.4      04 Nov 2019 06:55  Phos  3.0     11-04  Mg     1.9     11-04    TPro  4.8<L>  /  Alb  2.7<L>  /  TBili  0.6  /  DBili  x   /  AST  15  /  ALT  11  /  AlkPhos  78  11-04          Radiology:

## 2019-11-04 NOTE — PHYSICAL THERAPY INITIAL EVALUATION ADULT - TRANSFER TRAINING, PT EVAL
Pt will perform transfers from  with sliding board with supervision in 2 weeks. Pt will perform transfers from W/C with sliding board with supervision in 2 weeks.

## 2019-11-04 NOTE — PHYSICAL THERAPY INITIAL EVALUATION ADULT - PLANNED THERAPY INTERVENTIONS, PT EVAL
wheelchair management/propulsion training/transfer training/strengthening/balance training/bed mobility training

## 2019-11-04 NOTE — PHYSICAL THERAPY INITIAL EVALUATION ADULT - ADDITIONAL COMMENTS
Pt lives with  in one story ranch with ramp to enter. PTA pt performed mobility with a wheel chair, using a slide board or self pivot to transfer. Pt required assistance from  and HHA for ADLs and transfers. Pt lives with  in one story ranch with ramp to enter. PTA pt performed mobility with a wheel chair, using a slide board, scooting, or pop-over to transfer independently. Pt required assistance from  and HHA for ADLs/some mobility skills.

## 2019-11-04 NOTE — DIETITIAN INITIAL EVALUATION ADULT. - PERTINENT LABORATORY DATA
Na 139 [135 - 145], K+ 3.7 [3.5 - 5.3], BUN 5 [7 - 23], Cr 0.46 [0.50 - 1.30],  [70 - 99], Phos 3.0 [2.5 - 4.5], Alk Phos 78 [40 - 120], AST 15 [10 - 40], ALT 11 [10 - 45], Mg 1.9 [1.6 - 2.6], Ca 8.4 [8.4 - 10.5], HbA1c --

## 2019-11-04 NOTE — PHYSICAL THERAPY INITIAL EVALUATION ADULT - MANUAL MUSCLE TESTING RESULTS, REHAB EVAL
B UE 3+/5, L LE 3-/5, L LE dorsiflexion 3+/5, R LE 2+/5/grossly assessed due to B UE 3+/5, L LE 1+/5, L LE dorsiflexion 2-/5, R LE 1/5/grossly assessed due to

## 2019-11-04 NOTE — DIETITIAN INITIAL EVALUATION ADULT. - PHYSICAL APPEARANCE
Nutrition Focused Physical Assessment performed with patient's consent: pt noted with moderate muscle wasting of temples, clavicle, scapular, deltoid, interosseous hand muscle; mild fat depletion of orbital region. Suspect pt's muscle depletion may be more related to paraplegia rather than nutritional concerns as pt reports adequate nutrition intakes at home with stable wt

## 2019-11-04 NOTE — PROGRESS NOTE ADULT - PROBLEM SELECTOR PLAN 7
- DVT ppx: SCDs in setting of GI bleeding  - Diet: Soft mechanical diet  - Dispo: pending clinical improvement

## 2019-11-04 NOTE — DISCHARGE NOTE NURSING/CASE MANAGEMENT/SOCIAL WORK - PATIENT PORTAL LINK FT
You can access the FollowMyHealth Patient Portal offered by [facility] by registering at the following website: [email or web address]. By joining [facility]’s FollowMyHealth portal, you will also be able to view your health information using other applications (apps) compatible with our system.

## 2019-11-04 NOTE — PHYSICAL THERAPY INITIAL EVALUATION ADULT - PERTINENT HX OF CURRENT PROBLEM, REHAB EVAL
68 Year-Old Lady with history of aortic dissection s/p thoraco-abdominal aortic replacement with aorto-mesenteric bypass in 09/2016 with Dr. 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

## 2019-11-04 NOTE — PROGRESS NOTE ADULT - ASSESSMENT
In summary, intermittent massive GI bleed, from the UGI, without a clear etiology despite well over 10 endoscopies, numerous CT scan, and IR intervention.  Currently, no event of bleed.    Plan:    Advance to soft mechanical diet.  H/H  q 12 hours  I will review further plans with IR, surgery and vascular.     Donell Ambrocio MD

## 2019-11-04 NOTE — PROGRESS NOTE ADULT - PROBLEM SELECTOR PLAN 1
- Transition from clear liquid diet to soft mechanical diet  - c/w protonix IV BID  - H/H q12 hrs  - Transfuse for hgb <7  - GI following, recs appreciated  - Case will be discussed w/ IR, vasc, and surg

## 2019-11-05 NOTE — PROGRESS NOTE ADULT - ASSESSMENT
1. Left ureteral stone with hydronephrosis s/p left ureteral stent 8/12/19  2. Neurogenic bladder    Plan:    -scheduled for left URS/LL/stone extraction, stent exchange on 11/7 at 3:30pm. Will only plan to proceed with this elective procedure if she remains stable. She understands.   -needs GI/medical clearance to undergo general anesthesia for planned surgery  -NPO after midnight on Wednesday, 11/6  -no further need for IV abx from  standpoint   -CIC q4-6h for neurogenic bladder      Thank you for allowing me to assist in the care of this patient  Please feel free to call with any questions/concerns    Jorge Lares MD  C:  [phone]

## 2019-11-05 NOTE — PROGRESS NOTE ADULT - SUBJECTIVE AND OBJECTIVE BOX
Nathan Ferreira, PGY-1  Internal Medicine  Pager: [phone] (NS)/ [phone] ([place])    PROGRESS NOTE:     Patient is a 68y old  Female who presents with a chief complaint of GI Bleed (03 Nov 2019 14:59)    SUBJECTIVE / OVERNIGHT EVENTS:    Denies any current abdominal pain, Last BM yesterday and states it was soft and brown. No episodes of hematochezia, melena, nausea, or vomiting noted. Also denies any hematuria, dysuria.       MEDICATIONS  (STANDING):  amLODIPine   Tablet 5 milliGRAM(s) Oral daily  atorvastatin 40 milliGRAM(s) Oral at bedtime  baclofen 20 milliGRAM(s) Oral two times a day  chlorhexidine 4% Liquid 1 Application(s) Topical <User Schedule>  DULoxetine 20 milliGRAM(s) Oral two times a day  gabapentin 600 milliGRAM(s) Oral three times a day  labetalol 100 milliGRAM(s) Oral every 12 hours  lactated ringers. 1000 milliLiter(s) (75 mL/Hr) IV Continuous <Continuous>  pantoprazole  Injectable 40 milliGRAM(s) IV Push two times a day  prednisoLONE acetate 1% Suspension 1 Drop(s) Both EYES four times a day  sodium chloride 2% Ophthalmic Solution 1 Drop(s) Both EYES daily  valACYclovir 1000 milliGRAM(s) Oral three times a day    MEDICATIONS  (PRN):  oxyCODONE    IR 10 milliGRAM(s) Oral three times a day PRN Severe Pain (7 - 10)  zolpidem 5 milliGRAM(s) Oral at bedtime PRN Insomnia    I&O's Summary  04 Nov 2019 07:01  -  05 Nov 2019 07:00  --------------------------------------------------------  IN: 1635 mL / OUT: 1300 mL / NET: 335 mL    PHYSICAL EXAM:  Vital Signs Last 24 Hrs  T(C): 36.7 (05 Nov 2019 06:55), Max: 37.2 (04 Nov 2019 20:53)  T(F): 98.1 (05 Nov 2019 06:55), Max: 99 (04 Nov 2019 20:53)  HR: 71 (05 Nov 2019 06:55) (71 - 90)  BP: 135/73 (05 Nov 2019 06:55) (109/63 - 135/73)  BP(mean): --  RR: 18 (05 Nov 2019 06:55) (18 - 18)  SpO2: 96% (05 Nov 2019 06:55) (95% - 96%)    Constitutional: NAD, well-developed  Neck: No LAD, supple  Respiratory: CTABL  Cardiovascular: S1 and S2, systolic ejection murmur II/IV crescendo decrescendo heard best at R sternal border, no M/R/G  Gastrointestinal: BS+, soft, NT/ND, neg HSM. Baclofen pump in RLQ  Extremities: No peripheral edema, neg clubbing, cyanosis  Vascular: 2+ peripheral pulses  Neurological: A/O x 3, no focal deficits  Psychiatric: Normal mood, normal affect  Skin: No rashes    LABS:                               7.2    3.81  )-----------( 229      ( 05 Nov 2019 06:23 )             22.7     11-05    141  |  105  |  13  ----------------------------<  100<H>  3.7   |  28  |  0.37<L>    Ca    7.6<L>      05 Nov 2019 06:23  Phos  2.7     11-05  Mg     2.0     11-05    TPro  4.5<L>  /  Alb  2.5<L>  /  TBili  0.3  /  DBili  x   /  AST  13  /  ALT  11  /  AlkPhos  68  11-05    LIVER FUNCTIONS - ( 05 Nov 2019 06:23 )  Alb: 2.5 g/dL / Pro: 4.5 g/dL / ALK PHOS: 68 U/L / ALT: 11 U/L / AST: 13 U/L / GGT: x           CT Abdomen and Pelvis w/ IV Cont (10.28.19 @ 21:47)   IMPRESSION:     1. Infrarenal abdominal aortic dissection is again noted and is not   significantly changed.   2. Mild left hydronephrosis. Left renal collecting system stent. 6 x 9   mm, calculus noted within the left distal ureter, without significant   change. Tiny   left renal calculus.   3. Large amount of stool in the colon with rectal wall thickening and   adjacent perirectal infiltrative changes compatible with rectal   impaction, clinically correlate to exclude stercoral proctitis.      Case discussed w/ GI Nathan Ferreira, PGY-1  Internal Medicine  Pager: [phone] (NS)/ [phone] ([place])    PROGRESS NOTE:     Patient is a 68y old  Female who presents with a chief complaint of GI Bleed (03 Nov 2019 14:59)    SUBJECTIVE / OVERNIGHT EVENTS:    Pt had two loose black BMs last night. Denies any abdominal pain. No episodes of hematochezia, nausea, or vomiting noted. Also denies any hematuria, dysuria. Was transitioned back to clear liquid diet in setting of continuous GIB.    MEDICATIONS  (STANDING):  amLODIPine   Tablet 5 milliGRAM(s) Oral daily  atorvastatin 40 milliGRAM(s) Oral at bedtime  baclofen 20 milliGRAM(s) Oral two times a day  chlorhexidine 4% Liquid 1 Application(s) Topical <User Schedule>  DULoxetine 20 milliGRAM(s) Oral two times a day  gabapentin 600 milliGRAM(s) Oral three times a day  labetalol 100 milliGRAM(s) Oral every 12 hours  lactated ringers. 1000 milliLiter(s) (75 mL/Hr) IV Continuous <Continuous>  pantoprazole  Injectable 40 milliGRAM(s) IV Push two times a day  prednisoLONE acetate 1% Suspension 1 Drop(s) Both EYES four times a day  sodium chloride 2% Ophthalmic Solution 1 Drop(s) Both EYES daily  valACYclovir 1000 milliGRAM(s) Oral three times a day    MEDICATIONS  (PRN):  oxyCODONE    IR 10 milliGRAM(s) Oral three times a day PRN Severe Pain (7 - 10)  zolpidem 5 milliGRAM(s) Oral at bedtime PRN Insomnia    I&O's Summary  04 Nov 2019 07:01  -  05 Nov 2019 07:00  --------------------------------------------------------  IN: 1635 mL / OUT: 1300 mL / NET: 335 mL    PHYSICAL EXAM:  Vital Signs Last 24 Hrs  T(C): 36.7 (05 Nov 2019 06:55), Max: 37.2 (04 Nov 2019 20:53)  T(F): 98.1 (05 Nov 2019 06:55), Max: 99 (04 Nov 2019 20:53)  HR: 71 (05 Nov 2019 06:55) (71 - 90)  BP: 135/73 (05 Nov 2019 06:55) (109/63 - 135/73)  BP(mean): --  RR: 18 (05 Nov 2019 06:55) (18 - 18)  SpO2: 96% (05 Nov 2019 06:55) (95% - 96%)    Constitutional: NAD, well-developed  Neck: No LAD, supple  Respiratory: CTABL  Cardiovascular: S1 and S2, systolic ejection murmur II/IV crescendo decrescendo heard best at R sternal border, no M/R/G  Gastrointestinal: BS+, soft, NT/ND, neg HSM. Baclofen pump in RLQ  Extremities: No peripheral edema, neg clubbing, cyanosis  Vascular: 2+ peripheral pulses  Neurological: A/O x 3, no focal deficits  Psychiatric: Normal mood, normal affect  Skin: No rashes    LABS:                               7.2    3.81  )-----------( 229      ( 05 Nov 2019 06:23 )             22.7     11-05    141  |  105  |  13  ----------------------------<  100<H>  3.7   |  28  |  0.37<L>    Ca    7.6<L>      05 Nov 2019 06:23  Phos  2.7     11-05  Mg     2.0     11-05    TPro  4.5<L>  /  Alb  2.5<L>  /  TBili  0.3  /  DBili  x   /  AST  13  /  ALT  11  /  AlkPhos  68  11-05    LIVER FUNCTIONS - ( 05 Nov 2019 06:23 )  Alb: 2.5 g/dL / Pro: 4.5 g/dL / ALK PHOS: 68 U/L / ALT: 11 U/L / AST: 13 U/L / GGT: x           CT Abdomen and Pelvis w/ IV Cont (10.28.19 @ 21:47)   IMPRESSION:     1. Infrarenal abdominal aortic dissection is again noted and is not   significantly changed.   2. Mild left hydronephrosis. Left renal collecting system stent. 6 x 9   mm, calculus noted within the left distal ureter, without significant   change. Tiny   left renal calculus.   3. Large amount of stool in the colon with rectal wall thickening and   adjacent perirectal infiltrative changes compatible with rectal   impaction, clinically correlate to exclude stercoral proctitis.      Case discussed w/ GI, urology

## 2019-11-05 NOTE — PROGRESS NOTE ADULT - PROBLEM SELECTOR PLAN 3
- Neuro checks  - Hx of spinal hematoma   - c/w baclofen, oxycodone, and lidocaine patch  - c/w duloxetine, Ambien, Neurontin  - Repositin q2 hours - Neuro checks  - Hx of spinal hematoma   - c/w baclofen, oxycodone, and lidocaine patch  - c/w duloxetine, Ambien, Neurontin  - Reposition q2 hours

## 2019-11-05 NOTE — CHART NOTE - NSCHARTNOTEFT_GEN_A_CORE
Overnight had further melena.   H/H declined from 9.1/28.4 to 7.2/22.7.      Spoke to IR and reviewed the recent angiogram per IR.  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  Unable to access left gastric artery.    Spoke to GI from [facility], and [facility]ilda endoscopy was very concerning for bleeding source from the mid gastric body.    I also spoke to Dr. George Barriga (patient's vascular surgeon) NP and would like them to get involved for care while in the hospital and for recommendation in the event she continues to bleed and if EGD is unhelpful.    Keep NPO for EGD later today.  Medicine team will transfuse  1 unit of pRBC.    Donell Ambrocio MD

## 2019-11-05 NOTE — PROGRESS NOTE ADULT - PROBLEM SELECTOR PLAN 2
- CIC q4-6 hours for neurogenic bladder  - Laser lithotripsy 2/2 poor outpt follow up on 11/07/19 w/ stent replacement  -  following, recs appreciated - CIC q4-6 hours for neurogenic bladder  - Laser lithotripsy and stent replacement on 11/07/19  - NPO on Wednesday 11/6 at midnight  -  following, recs appreciated

## 2019-11-05 NOTE — PROGRESS NOTE ADULT - PROBLEM SELECTOR PLAN 1
- 2/2 active GIB, restarted on clear liquid diet  - c/w protonix IV BID  - H/H q12 hrs  - Transfuse for hgb <7  - GI following, recs appreciated  - Case will be discussed w/ IR, vasc, and surg - 2/2 active GIB, restarted on clear liquid diet  - c/w protonix IV BID  - H/H q12 hrs  - Transfuse for hgb <7  - NPO for EGD later this afternoon  - GI following, recs appreciated  - Case will be discussed w/ IR, vasc, and surg

## 2019-11-05 NOTE — PROGRESS NOTE ADULT - SUBJECTIVE AND OBJECTIVE BOX
Patient seen and examined at bedside  s/p endoscopy today for continued bleeding - no active bleeding found  hgb stable now  No f/c/n/v  No pain    Medications  amLODIPine   Tablet 5 milliGRAM(s) Oral daily  atorvastatin 40 milliGRAM(s) Oral at bedtime  baclofen 20 milliGRAM(s) Oral two times a day  chlorhexidine 4% Liquid 1 Application(s) Topical <User Schedule>  DULoxetine 20 milliGRAM(s) Oral two times a day  gabapentin 600 milliGRAM(s) Oral three times a day  labetalol 100 milliGRAM(s) Oral every 12 hours  lactated ringers. 1000 milliLiter(s) IV Continuous <Continuous>  oxyCODONE    IR 10 milliGRAM(s) Oral three times a day PRN  pantoprazole    Tablet 40 milliGRAM(s) Oral every 12 hours  prednisoLONE acetate 1% Suspension 1 Drop(s) Both EYES four times a day  sodium chloride 2% Ophthalmic Solution 1 Drop(s) Both EYES daily  valACYclovir 1000 milliGRAM(s) Oral three times a day  zolpidem 5 milliGRAM(s) Oral at bedtime PRN      ROS  General: No fevers, chills, night sweats, fatigue, malaise  Skin: no new skin lesions, hair changes, prutitus  Ophthalmologic: No eye pain,  swelling,   ENMT: No hearing changes,  ear pain,  nasal congestion, sinus pain  Respiratory and Thorax: No cough, sputum, dyspnea, wheezing  Cardiovascular: No chest pain, SOB, PND, dyspnea on exertion, orthopnea  Genitourinary: see above  Neurological: No syncope, loss of consciousness, headache, dizziness      Vital Signs Last 24 Hrs  T(C): 36.6 (05 Nov 2019 17:37), Max: 37.2 (04 Nov 2019 20:53)  T(F): 97.8 (05 Nov 2019 17:37), Max: 99 (04 Nov 2019 20:53)  HR: 80 (05 Nov 2019 17:37) (71 - 80)  BP: 142/72 (05 Nov 2019 17:37) (109/63 - 142/72)  BP(mean): --  RR: 18 (05 Nov 2019 17:37) (18 - 18)  SpO2: 98% (05 Nov 2019 17:37) (96% - 99%)    PHYSICAL EXAM:  Constitutional: NAD, lying in bed comfortably   Eyes: EOMI  Neck: neck supple  Back: no CVA tenderness bilaterally  Respiratory: no labored breathing  Cardiovascular: no peripheral edema, cyanosis, or pallor. Extremities are warm and well perfused.   Gastrointestinal: soft, non-tender, non-distended  Genitourinary: bladder non-palpable  Extremities: contracted lower extremities.   Neurological:  A&O x3  Psychiatric: normal mood and affect        I/O    11-04-19 @ 07:01  -  11-05-19 @ 07:00  --------------------------------------------------------  IN: 0 mL / OUT: 1300 mL / NET: -1300 mL    11-05-19 @ 07:01  -  11-05-19 @ 18:27  --------------------------------------------------------  IN: 0 mL / OUT: 1150 mL / NET: -1150 mL        Labs:  CBC Full  -  ( 05 Nov 2019 06:23 )  WBC Count : 3.81 K/uL  Hemoglobin : 7.2 g/dL  Hematocrit : 22.7 %  Platelet Count - Automated : 229 K/uL  Mean Cell Volume : 87.6 fl  Mean Cell Hemoglobin : 27.8 pg  Mean Cell Hemoglobin Concentration : 31.7 gm/dL  Auto Neutrophil # : x  Auto Lymphocyte # : x  Auto Monocyte # : x  Auto Eosinophil # : x  Auto Basophil # : x  Auto Neutrophil % : x  Auto Lymphocyte % : x  Auto Monocyte % : x  Auto Eosinophil % : x  Auto Basophil % : x    11-05    141  |  105  |  13  ----------------------------<  100<H>  3.7   |  28  |  0.37<L>    Ca    7.6<L>      05 Nov 2019 06:23  Phos  2.7     11-05  Mg     2.0     11-05    TPro  4.5<L>  /  Alb  2.5<L>  /  TBili  0.3  /  DBili  x   /  AST  13  /  ALT  11  /  AlkPhos  68  11-05

## 2019-11-05 NOTE — PROGRESS NOTE ADULT - SUBJECTIVE AND OBJECTIVE BOX
Pre-Endoscopy Evaluation      Referring Physician: dr. 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 2018  UTI (urinary tract infection): recurrent  TIA (transient ischemic attack)  HTN (Hypertension)  History of corneal transplant: left corneal transplant on 5/21/2018  Disorder of spine: unthetethering 2 x  Presence of IVC filter: 2014 ?  S/P aortic dissection repair: Type A Dissection repair /2009   descending aortic aneurysm repair 9/2016  H/O Spinal surgery: laminectomies 2014      PMH of Gastroparesis [ ]  Gastric Surgery [ ]  Gastric Outlet Obstruction [ ]    Allergies:    No Known Allergies    Intolerances:    Latex allergy: [ ] yes [x]no    Medications:MEDICATIONS  (STANDING):  amLODIPine   Tablet 5 milliGRAM(s) Oral daily  atorvastatin 40 milliGRAM(s) Oral at bedtime  baclofen 20 milliGRAM(s) Oral two times a day  chlorhexidine 4% Liquid 1 Application(s) Topical <User Schedule>  DULoxetine 20 milliGRAM(s) Oral two times a day  gabapentin 600 milliGRAM(s) Oral three times a day  labetalol 100 milliGRAM(s) Oral every 12 hours  lactated ringers. 1000 milliLiter(s) (75 mL/Hr) IV Continuous <Continuous>  pantoprazole  Injectable 40 milliGRAM(s) IV Push two times a day  prednisoLONE acetate 1% Suspension 1 Drop(s) Both EYES four times a day  sodium chloride 2% Ophthalmic Solution 1 Drop(s) Both EYES daily  valACYclovir 1000 milliGRAM(s) Oral three times a day    MEDICATIONS  (PRN):  oxyCODONE    IR 10 milliGRAM(s) Oral three times a day PRN Severe Pain (7 - 10)  zolpidem 5 milliGRAM(s) Oral at bedtime PRN Insomnia      Smoking: [ ] yes  [x] no    AICD/PPM: [ ] yes   [x] no    Pertinent lab data:                        7.2    3.81  )-----------( 229      ( 05 Nov 2019 06:23 )             22.7     11-05    141  |  105  |  13  ----------------------------<  100<H>  3.7   |  28  |  0.37<L>    Ca    7.6<L>      05 Nov 2019 06:23  Phos  2.7     11-05  Mg     2.0     11-05    TPro  4.5<L>  /  Alb  2.5<L>  /  TBili  0.3  /  DBili  x   /  AST  13  /  ALT  11  /  AlkPhos  68  11-05        Physical Examination:    Daily   Vital Signs Last 24 Hrs  T(C): 37 (05 Nov 2019 14:30), Max: 37.2 (04 Nov 2019 20:53)  T(F): 98.6 (05 Nov 2019 14:30), Max: 99 (04 Nov 2019 20:53)  HR: 72 (05 Nov 2019 14:30) (71 - 90)  BP: 115/58 (05 Nov 2019 14:30) (109/63 - 135/73)  BP(mean): --  RR: 18 (05 Nov 2019 14:30) (18 - 18)  SpO2: 97% (05 Nov 2019 14:30) (96% - 99%)    Drug Dosing Weight  Height (cm): 170.2 (02 Nov 2019 17:20)  Weight (kg): 57.6 (02 Nov 2019 17:20)  BMI (kg/m2): 19.9 (02 Nov 2019 17:20)  BSA (m2): 1.67 (02 Nov 2019 17:20)    Constitutional: NAD     Neck:  No JVD    Respiratory: CTAB/L    Cardiovascular: S1 and S2    Gastrointestinal: BS+, soft, NT/ND    Extremities: No peripheral edema    Neurological: A/O x 3    : No Russo    Skin: No rashes    Comments: s/p 1 unit prbcs    The patient is a suitable candidate for the planned procedure unless box checked [ ]  No, explain:

## 2019-11-05 NOTE — PROGRESS NOTE ADULT - SUBJECTIVE AND OBJECTIVE BOX
Covering for Dr. Ambrocio    See procedure note for full detail.  No blood, active bleeding or source for bleeding seen in the upper GI tract.  One inflamed area with clips previously applied.  Two additional clips placed and epinephrine injected.    Resume diet.  PPI BID.  Care to be resumed with Dr Ambrocio.

## 2019-11-06 NOTE — DISCHARGE NOTE PROVIDER - HOSPITAL COURSE
69yo F h/o aortic dissection s/p multiple repairs, spinal hematoma resulting in paraplegia (on baclofen pump), nephrolithiasis with retained stent (did not f/u to remove as per pt s/p d/c home), recurrent UTIs ESBL positive in the past 2/2 to straight catherization, HTN, PAD, and HSV endophthalmitis/keratitis s/p left corneal transplant who presented to [facility] on 10/28 with lethargy, weakness, malaise. Per chart review, Pt denied any bright red bleeding per rectum, vomiting, diarrhea, nausea, abdominal pain, but did endorse dark stool. Of note, Pt has been hospitalized multiple times with endoscopies and capsule endoscopies, and CTA's which have all been without source of bleeding. 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 On day of admission the patient's hemoglobin was 5.2. The patient was transfused 6 units of PRBCs. The patient went for an endoscopy on 10/31 and was found to have red blood in the gastric fundus and body but the source of the blood could not be found. IR was consulted for an emergent arteriogram. 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 The patient was intubated and placed under ICU care for acute hypoxic respiratory failure and then extubated the next morning once she was hemodynamically stable. The patient's hemoglobin continued to decline. Pt was also being treated for UTI with meropenem given history of ESBL. Pt was then hemodynamically stable for transfer to [facility]. On day of transfer the patient had received 4 days worth of meropenem, and was discontinued upon transfer. Patient was transferred to [facility] MICU for further management of GI bleed as well as possible partial gastrectomy. In the ICU, GI consulted with recommendation to consult vascular surgery, who recs state that there are no surgical interventions for now. She was then transferred to regular floors and had 2 episodes of melena, underwent 1U pRBCs then underwent another EGD on 11/05/19 and had 2 clips placed at site of previous clippings. She remained stable after EGD and was placed on a regular diet. On 11/06/19 she was assessed by GI (Dr. Ambrocio) who spoke w/ vascular surgery (Dr. George Barriga) who has agreed to attempt accessing the celiac and access the L gastric artery. She is now stable for discharge w/ transfer to [facility] in [place] to undergo further care under the vascular team. Plans were discussed w/ the patient and the family who were in agreement and understanding. 67yo F h/o aortic dissection s/p multiple repairs, spinal hematoma resulting in paraplegia (on baclofen pump), nephrolithiasis with retained stent (did not f/u to remove as per pt s/p d/c home), recurrent UTIs ESBL positive in the past 2/2 to straight catherization, HTN, PAD, and HSV endophthalmitis/keratitis s/p left corneal transplant who presented to [facility] on 10/28 with lethargy, weakness, malaise. Per chart review, Pt denied any bright red bleeding per rectum, vomiting, diarrhea, nausea, abdominal pain, but did endorse dark stool. Of note, Pt has been hospitalized multiple times with endoscopies and capsule endoscopies, and CTA's which have all been without source of bleeding. 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 On day of admission the patient's hemoglobin was 5.2. The patient was transfused 6 units of PRBCs. The patient went for an endoscopy on 10/31 and was found to have red blood in the gastric fundus and body but the source of the blood could not be found. IR was consulted for an emergent arteriogram. 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 The patient was intubated and placed under ICU care for acute hypoxic respiratory failure and then extubated the next morning once she was hemodynamically stable. The patient's hemoglobin continued to decline. Pt was also being treated for UTI with meropenem given history of ESBL. Pt was then hemodynamically stable for transfer to [facility]. On day of transfer the patient had received 4 days worth of meropenem, and was discontinued upon transfer. Patient was transferred to [facility] MICU for further management of GI bleed as well as possible partial gastrectomy. In the ICU, GI consulted with recommendation to consult vascular surgery, who recs state that there are no surgical interventions for now. She was then transferred to regular floors and had 2 episodes of melena, underwent 1U pRBCs then underwent another EGD on 11/05/19 and had 2 clips placed at site of previous clippings. She remained stable after EGD and was placed on a regular diet.        During her hospitalization she was followed by urology and was planned to undergo laser lithotripsy and stent exchange on 1107/19 but was unable to undergo procedure secondary to transfer. She was advised to have urology consult at [facility] to assess and attempt stent exchange to prevent further calcification. On 11/06/19 she was assessed by GI (Dr. Ambrocio) who spoke w/ vascular surgery (Dr. George Barriga) who has agreed to attempt accessing the celiac and access the L gastric artery. She is now stable for discharge w/ transfer to [facility] in [place] to undergo further care under the vascular team. Plans were discussed w/ the patient and the family who were in agreement and understanding.

## 2019-11-06 NOTE — PROGRESS NOTE ADULT - PROBLEM SELECTOR PLAN 1
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 Unable to access left gastric artery from IR perspective  - Vascular surgery consulted, recs appreciated  - GI following, recs appreciated

## 2019-11-06 NOTE — DISCHARGE NOTE PROVIDER - PROVIDER TOKENS
PROVIDER:[TOKEN:[[id number]],FOLLOWUP:[2 weeks],ESTABLISHEDPATIENT:[T]],PROVIDER:[TOKEN:[[id number]],FOLLOWUP:[Routine],ESTABLISHEDPATIENT:[T]]

## 2019-11-06 NOTE — PROGRESS NOTE ADULT - SUBJECTIVE AND OBJECTIVE BOX
Nathan Ferreira, PGY-1  Internal Medicine  Pager: [phone] (NS)/ [phone] ([place]ET[place])    PROGRESS NOTE:     Patient is a 68y old  Female who presents with a chief complaint of GI Bleed (03 Nov 2019 14:59)    SUBJECTIVE / OVERNIGHT EVENTS:    Pt underwent EGD yesterday w/ no source of active GIB identified. Site of previous clipping appeared inflammed and thus 2 more clips and an injection of epi was administered. Pt returned to the floors on soft mechanical diet. She denies any abd pain, N/V, constipation, diarrhea after returning to floors.    MEDICATIONS  (STANDING):  amLODIPine   Tablet 5 milliGRAM(s) Oral daily  atorvastatin 40 milliGRAM(s) Oral at bedtime  baclofen 20 milliGRAM(s) Oral two times a day  chlorhexidine 4% Liquid 1 Application(s) Topical <User Schedule>  DULoxetine 20 milliGRAM(s) Oral two times a day  gabapentin 600 milliGRAM(s) Oral three times a day  labetalol 100 milliGRAM(s) Oral every 12 hours  lactated ringers. 1000 milliLiter(s) (75 mL/Hr) IV Continuous <Continuous>  pantoprazole    Tablet 40 milliGRAM(s) Oral every 12 hours  prednisoLONE acetate 1% Suspension 1 Drop(s) Both EYES four times a day  sodium chloride 2% Ophthalmic Solution 1 Drop(s) Both EYES daily  valACYclovir 1000 milliGRAM(s) Oral three times a day    MEDICATIONS  (PRN):  oxyCODONE    IR 10 milliGRAM(s) Oral three times a day PRN Severe Pain (7 - 10)  zolpidem 5 milliGRAM(s) Oral at bedtime PRN Insomnia    I&O's Summary  05 Nov 2019 07:01  -  06 Nov 2019 07:00  --------------------------------------------------------  IN: 240 mL / OUT: 1150 mL / NET: -910 mL    PHYSICAL EXAM:  Vital Signs Last 24 Hrs  T(C): 36.9 (06 Nov 2019 06:00), Max: 37.1 (06 Nov 2019 04:17)  T(F): 98.5 (06 Nov 2019 06:00), Max: 98.7 (06 Nov 2019 04:17)  HR: 81 (06 Nov 2019 06:00) (72 - 82)  BP: 121/70 (06 Nov 2019 06:00) (115/58 - 142/72)  BP(mean): --  RR: 18 (06 Nov 2019 06:00) (18 - 18)  SpO2: 96% (06 Nov 2019 06:00) (94% - 99%)    Constitutional: NAD, well-developed  Neck: No LAD, supple  Respiratory: CTABL  Cardiovascular: S1 and S2, systolic ejection murmur II/IV crescendo decrescendo heard best at R sternal border, no M/R/G  Gastrointestinal: BS+, soft, NT/ND, neg HSM. Baclofen pump in RLQ  Extremities: No peripheral edema, neg clubbing, cyanosis  Vascular: 2+ peripheral pulses  Neurological: A/O x 3, no focal deficits  Psychiatric: Normal mood, normal affect  Skin: No rashes    LABS:                 8.0    5.34  )-----------( 204      ( 06 Nov 2019 06:18 )             25.6     11-06    132<L>  |  99  |  12  ----------------------------<  115<H>  3.5   |  26  |  0.42<L>    Ca    8.1<L>      06 Nov 2019 06:18  Phos  2.9     11-06  Mg     1.9     11-06    TPro  4.4<L>  /  Alb  2.3<L>  /  TBili  0.4  /  DBili  x   /  AST  16  /  ALT  12  /  AlkPhos  69  11-06    LIVER FUNCTIONS - ( 06 Nov 2019 06:18 )  Alb: 2.3 g/dL / Pro: 4.4 g/dL / ALK PHOS: 69 U/L / ALT: 12 U/L / AST: 16 U/L / GGT: x           Upper Endoscopy (11.05.19 @ 15:43)  Impression:  - No source for bleeding seen.                        - Cipls seen from prior endoscopy in an inflamed area. Two additonal clips                       placed. Epinephrine injected.  Recommendation:      - [facility].                       - Resume previous diet.                       - Use Protonix (pantoprazole) 40 mg PO BID.    CT Abdomen and Pelvis w/ IV Cont (10.28.19 @ 21:47)   IMPRESSION:     1. Infrarenal abdominal aortic dissection is again noted and is not   significantly changed.   2. Mild left hydronephrosis. Left renal collecting system stent. 6 x 9   mm, calculus noted within the left distal ureter, without significant   change. Tiny   left renal calculus.   3. Large amount of stool in the colon with rectal wall thickening and   adjacent perirectal infiltrative changes compatible with rectal   impaction, clinically correlate to exclude stercoral proctitis.      Case discussed w/ GI, urology

## 2019-11-06 NOTE — DISCHARGE NOTE PROVIDER - NSDCCPCAREPLAN_GEN_ALL_CORE_FT
PRINCIPAL DISCHARGE DIAGNOSIS  Diagnosis: GIB (gastrointestinal bleeding)  Assessment and Plan of Treatment: You presented to the hospital w/ bleeding per rectum likely in the setting of an upper gastrointestinal source (likely stomach). You underwent an EGD during which 2 clips were placed and an injection of epindephrine was administered. Your case was reviewed w/ the interventional radiology team and vascular surgery and it was deemed that you will undergo access to your vasculature through vascular surgery as to attempt to decrease the frequency or the occurence of the bleeding. You are to be transferred to [facility] for further care and management. PRINCIPAL DISCHARGE DIAGNOSIS  Diagnosis: GIB (gastrointestinal bleeding)  Assessment and Plan of Treatment: You presented to the hospital w/ bleeding per rectum likely in the setting of an upper gastrointestinal source (likely stomach). You underwent an EGD during which 2 clips were placed and an injection of epindephrine was administered. Your case was reviewed w/ the interventional radiology team and vascular surgery and it was deemed that you will undergo access to your vasculature through vascular surgery as to attempt to decrease the frequency or the occurence of the bleeding. You are to be transferred to [facility] for further care and management.

## 2019-11-06 NOTE — PROGRESS NOTE ADULT - PROBLEM SELECTOR PLAN 2
- NPO after midnight for laser lithotripsy and stent replacement on 11/07/19  -  following, recs appreciated

## 2019-11-06 NOTE — PROGRESS NOTE ADULT - PROBLEM SELECTOR PLAN 4
- Neuro checks  - Hx of spinal hematoma   - c/w baclofen, oxycodone, and lidocaine patch  - c/w duloxetine, Ambien, Neurontin  - Reposition q2 hours

## 2019-11-06 NOTE — DISCHARGE NOTE PROVIDER - NSDCMRMEDTOKEN_GEN_ALL_CORE_FT
amLODIPine 5 mg oral tablet: 1 tab(s) orally once a day  atorvastatin 40 mg oral tablet: 1 tab(s) orally once a day (at bedtime)  baclofen 20 mg oral tablet: 1 tab(s) orally 2 times a day  diclofenac 1% topical gel: Apply 4 grams to affected area 4 times daily as needed  DULoxetine 20 mg oral delayed release capsule: 1 cap(s) orally 2 times a day  gabapentin 600 mg oral tablet: 1 tab(s) orally 3 times a day  labetalol 200 mg oral tablet: 1 tab(s) orally 2 times a day  oxyCODONE 10 mg oral tablet: 1 tab(s) orally 3 times a day, As needed, Severe Pain (7 - 10)  prednisoLONE acetate 1% ophthalmic suspension: 1 drop(s) to each affected eye 4 times a day  Protonix 40 mg oral delayed release tablet: 1 tab(s) orally 2 times a day  sodium chloride, hypertonic 5% ophthalmic solution: 1 application to each affected eye once a day  valACYclovir 1 g oral tablet: 1 tab(s) orally 3 times a day  zolpidem 5 mg oral tablet: 1 tab(s) orally once a day (at bedtime), As Needed MDD:5 mgs amLODIPine 5 mg oral tablet: 1 tab(s) orally once a day  atorvastatin 40 mg oral tablet: 1 tab(s) orally once a day (at bedtime)  baclofen 20 mg oral tablet: 1 tab(s) orally 2 times a day  diclofenac 1% topical gel: Apply 4 grams to affected area 4 times daily as needed  DULoxetine 20 mg oral delayed release capsule: 1 cap(s) orally 2 times a day  gabapentin 600 mg oral tablet: 1 tab(s) orally 3 times a day  labetalol 200 mg oral tablet: 1 tab(s) orally 2 times a day  oxyCODONE 10 mg oral tablet: 1 tab(s) orally 3 times a day, As needed, Severe Pain (7 - 10)  prednisoLONE acetate 1% ophthalmic suspension: 1 drop(s) to each affected eye 4 times a day  Protonix 20 mg oral delayed release tablet: 1 tab(s) orally once a day  sodium chloride, hypertonic 5% ophthalmic solution: 1 application to each affected eye once a day  valACYclovir 1 g oral tablet: 1 tab(s) orally 3 times a day  zolpidem 5 mg oral tablet: 1 tab(s) orally once a day (at bedtime), As Needed MDD:5 mgs

## 2019-11-07 NOTE — DIETITIAN INITIAL EVALUATION ADULT. - ADD RECOMMEND
1) Recommend advancing to low-fiber diet when medically feasible. 2) Obtain and monitor wt trends. 3) Monitor lytes and replete prn. 4) RD to f/u for further assessment, NFPE, nutrition education.

## 2019-11-07 NOTE — H&P ADULT - NSHPPHYSICALEXAM_GEN_ALL_CORE
Physical Exam  CONSTITUTIONAL:                                                              WNL  NEURO:                                                                       paraplegia/no motor lower extremities bilateral                      EYES:                                                                                WNL  ENMT:                                                                               WNL  CV:                                                                                   WNL  RESPIRATORY:                                                                 WNL  GI:                                                                                     WNL  : CLARKE + / -                                                                  requires prn straight cath  MUSKULOSKELETAL:                                                       WNL  SKIN / BREAST:                                                                  WNL  EXTREMITIES:                                                                 wnl

## 2019-11-07 NOTE — H&P ADULT - ASSESSMENT
67yo F h/o aortic dissection s/p multiple repairs, spinal hematoma resulting in paraplegia (on baclofen pump), nephrolithiasis with retained stent (did not f/u to remove as per pt s/p d/c home), recurrent UTIs ESBL positive in the past 2/2 to straight catheterizations, HTN, PAD, and HSV endophthalmitis/keratitis s/p left corneal transplant who presented to [facility] on 10/28 with lethargy, weakness, malaise. Per chart review, Pt denied any bright red bleeding per rectum, vomiting, diarrhea, nausea, abdominal pain, but did endorse dark stool. Of note, Pt has been hospitalized multiple times with endoscopies and capsule endoscopies, and CTA's which have all been without source of bleeding. 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 On day of admission the patient's hemoglobin was 5.2. The patient was transfused 6 units of PRBCs. The patient went for an endoscopy on 10/31 and was found to have red blood in the gastric fundus and body but the source of the blood could not be found. IR was consulted for an emergent arteriogram. 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 The patient was intubated and placed under ICU care for acute hypoxic respiratory failure and then extubated the next morning once she was hemodynamically stable. The patient's hemoglobin continued to decline. Pt was also being treated for UTI with meropenem given history of ESBL. Pt was then hemodynamically stable for transfer to [facility]. On day of transfer the patient had received 4 days worth of meropenem, and was discontinued upon transfer. Patient was transferred to [facility] MICU for further management of GI bleed as well as possible partial gastrectomy. In the ICU, GI consulted with recommendation to consult vascular surgery, who recs state that there are no surgical interventions for now. She was then transferred to regular floors and had 2 episodes of melena, underwent 1U pRBCs then underwent another EGD on 11/05/19 and had 2 clips placed at site of previous clippings. She remained stable after EGD and was placed on a regular diet.    During her hospitalization she was followed by urology and was planned to undergo laser lithotripsy and stent exchange on 1107/19 but was unable to undergo procedure secondary to transfer. She was advised to have urology consult at [facility] to assess and attempt stent exchange to prevent further calcification. On 11/06/19 she was assessed by GI at [facility] (Dr. Ambrocio) who spoke with Dr. George Barriga who has agreed to attempt accessing the celiac and access the L gastric artery. and rule out aortoenteric fistula.

## 2019-11-07 NOTE — CONSULT NOTE ADULT - ASSESSMENT
67yo F h/o aortic dissection s/p multiple repairs, spinal hematoma resulting in paraplegia admitted to r/o Aortoenteric fistula    Plan:  Dr Donaldson to assist Dr Rock and Dr Barriga with case

## 2019-11-07 NOTE — DIETITIAN INITIAL EVALUATION ADULT. - OTHER INFO
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     Multiple attempts made to visit pt. Unable to obtain subjective info from pt at this time as pt off unit for cardiac cath. Note based on comprehensive chart review, discussion with RN. Pt seen by RD multiple times in the past, most recently seen 11/4/19 (3 days ago) at OSH. Usual diet recall noted consists of take-out foods or regular meals cooked by . NKFA noted. No chewing/swallowing difficulties noted. Pt noted taking vitamin B12, C, D supplementation PTA. Pt previously reported UBW 120lbs, now noted w/ admit wt of 105lbs (11/6). ?Accuracy of admit wt. Continue to monitor wt trends. Pt is currently NPO pending tests. Unable to assess pain at this time. No N/V/D/C noted. +BM 11/3 noted. Skin: intact noted per RN flowsheet. RD to f/u for further assessment and nutrition education. 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     Multiple attempts made to visit pt, however, pt remains off unit for cardiac cath per RN. Unable to obtain subjective info from pt at this time. Note based on comprehensive chart review and discussion with RN. Pt noted seen by RD multiple times in previous admissions, most recently seen 11/4/19 (3 days ago) at OSH. Usual diet recall noted consists of take-out foods or "regular" meals cooked by . NKFA noted. No chewing/swallowing difficulties noted. Pt noted taking vitamin B12, C, D supplementation PTA. Pt previously reported UBW 120lbs--> 121.2lbs (10/29) --> 120lbs (11/4) --> now noted w/ admit wt of 105lbs (11/6). ?Accuracy of admit wt as it indicates 15lbs wt change x 3 days. Continue to monitor wt trends. Pt is currently NPO pending tests. Pt noted previously on dysphagia 2 mechanical soft-thin liquids diet at OSH w/ good >75% PO intake (?dysphagia diet-last documented formal swallow eval 9/21/18, SLP recommended regular texture w/ thin liquids); Would recommend advancing to low-fiber diet when PO diet resumes. Unable to assess pain at this time. No N/V/D/C noted. +BM 11/3 noted. Skin: intact noted per RN flowsheet. RD to f/u for further assessment, nutrition focused physical exam, and nutrition education. 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     Multiple attempts made to visit pt, however, pt remains off unit for cardiac cath per RN. Unable to obtain subjective info from pt at this time. Note based on comprehensive chart review. Pt noted seen by RD multiple times in previous admissions, most recently seen 11/4/19 (3 days ago) at OSH. Usual diet recall noted consists of take-out foods or "regular" meals cooked by . NKFA noted. No chewing/swallowing difficulties noted. Pt noted taking vitamin B12, C, D supplementation PTA. Pt previously reported UBW 120lbs--> 121.2lbs (10/29) --> 120lbs (11/4) --> now noted w/ admit wt of 105lbs (11/6). ?Accuracy of admit wt as it indicates 15lbs wt change x 3 days. Continue to monitor wt trends. Pt is currently NPO pending tests. Pt noted previously on dysphagia 2 mechanical soft-thin liquids diet at OSH w/ good >75% PO intake (?dysphagia diet-last documented formal swallow eval 9/21/18, SLP recommended regular texture w/ thin liquids); Would recommend advancing to low-fiber diet when PO diet resumes. Unable to assess pain at this time. No N/V/D/C noted. +BM 11/3 noted. Skin: intact noted per RN flowsheet. RD to f/u for further assessment, nutrition focused physical exam, and nutrition education.

## 2019-11-07 NOTE — H&P ADULT - HISTORY OF PRESENT ILLNESS
67yo F h/o aortic dissection s/p multiple repairs, spinal hematoma resulting in paraplegia (on baclofen pump), nephrolithiasis with retained stent (did not f/u to remove as per pt s/p d/c home), recurrent UTIs ESBL positive in the past 2/2 to straight catheterizations, HTN, PAD, and HSV endophthalmitis/keratitis s/p left corneal transplant who presented to [facility] on 10/28 with lethargy, weakness, malaise. Per chart review, Pt denied any bright red bleeding per rectum, vomiting, diarrhea, nausea, abdominal pain, but did endorse dark stool. Of note, Pt has been hospitalized multiple times with endoscopies and capsule endoscopies, and CTA's which have all been without source of bleeding. 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 On day of admission the patient's hemoglobin was 5.2. The patient was transfused 6 units of PRBCs. The patient went for an endoscopy on 10/31 and was found to have red blood in the gastric fundus and body but the source of the blood could not be found. IR was consulted for an emergent arteriogram. 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 The patient was intubated and placed under ICU care for acute hypoxic respiratory failure and then extubated the next morning once she was hemodynamically stable. The patient's hemoglobin continued to decline. Pt was also being treated for UTI with meropenem given history of ESBL. Pt was then hemodynamically stable for transfer to [facility]. On day of transfer the patient had received 4 days worth of meropenem, and was discontinued upon transfer. Patient was transferred to [facility] MICU for further management of GI bleed as well as possible partial gastrectomy. In the ICU, GI consulted with recommendation to consult vascular surgery, who recs state that there are no surgical interventions for now. She was then transferred to regular floors and had 2 episodes of melena, underwent 1U pRBCs then underwent another EGD on 11/05/19 and had 2 clips placed at site of previous clippings. She remained stable after EGD and was placed on a regular diet.    During her hospitalization she was followed by urology and was planned to undergo laser lithotripsy and stent exchange on 1107/19 but was unable to undergo procedure secondary to transfer. She was advised to have urology consult at [facility] to assess and attempt stent exchange to prevent further calcification. On 11/06/19 she was assessed by GI at [facility] (Dr. Ambrocio) who spoke with Dr. George Barriga who has agreed to attempt accessing the celiac and access the L gastric artery. and rule out aortoenteric fistula.

## 2019-11-07 NOTE — ED ADULT TRIAGE NOTE - AS TEMP SITE
oral Area M Indication Text: Tumors in this location are included in Area M (cheek, forehead, scalp, neck, jawline and pretibial skin).  Mohs surgery is indicated for tumors in these anatomic locations.

## 2019-11-07 NOTE — PROGRESS NOTE ADULT - ASSESSMENT
69 y/o Female with h/o aortic dissection s/p multiple repairs, spinal hematoma resulting in paraplegia (on baclofen pump), nephrolithiasis with retained stent (did not f/u to remove as per pt s/p d/c home), recurrent UTIs ESBL positive in the past 2/2 to straight catheterizations, HTN, PAD, and HSV endophthalmitis/keratitis s/p left corneal transplant who presented to [facility] on 10/28/19 with lethargy, weakness, malaise, and endorsing dark stools. Of note, patient has a history of recurrent GIB, and has been hospitalized multiple times with endoscopies, and CTA's which have all been without source of bleeding. On day of admission to OSH, the patient's hemoglobin was 5.2, and s/p 6 uPRBCs. S/p endoscopy on 10/31/19, found to have red blood in the gastric fundus and body but the source of the blood could not be found. 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 The patient was intubated and placed under ICU care for acute hypoxic respiratory failure and then extubated the next morning. Of note, pt was also being treated for UTI with meropenem given history of ESBL.  She was then transferred to regular floor and had 2 episodes of melena, underwent 1UpRBCs then underwent another EGD on 11/05/19 and had 2 clips placed at site of previous clippings.   Of note, during her hospitalization she was followed by urology and was planned to undergo laser lithotripsy and stent exchange on 11/7/19 but was unable to undergo procedure secondary to transfer.   Today, 11/7/19, patient underwent splenic and left gastric arterial embolizations with vascular surgery, Dr. Rock.     A/P:  Neurovascular: No delirium.  - Paraplegia with hx of spinal hematoma: continue neuro checks, c/w baclofen 20mg BID, oxycodone, lidoderm patch for pain.   - Continue duloxetine, gabapentin  - Tylenol PRN for pain.     Cardiovascular: Hemodynamically stable. HR controlled.   - Hx of aortic dissection s/p multiple repairs, EF 60-65% on TTE 8/2019, HTN, HLD.  - C/w labetolol 200mg PO BID, amlodipine 5mg PO qd, and atorvastatin 40mg PO qhs.   - Continue to monitor HR/BP/Tele.     Respiratory: 02 Sat = 98% on RA.  -If on oxygen wean to RA from for O2 Sat > 93%.  -Encourage C+DB and Use of IS 10x / hr while awake.  -CXR in AM    GI:   -NPO after MN.  -PPX.  -PO Diet.    Renal / :  -Monitor renal function.  -Monitor I/O's.    Endocrine:    -A1c.  -TSH.    Hematologic:  -CBC.  -Coagulation Panel.    ID:  -Tempature.  -CBC.  -Observe for SIRS/Sepsis Syndrome.    Prophylaxis:  -DVT prophylaxis with 5000 SubQ Heparin q8h.  -SCD's    Disposition:  -ICU for frequent monitoring. 69 y/o Female with h/o aortic dissection s/p multiple repairs, spinal hematoma resulting in paraplegia (on baclofen pump), nephrolithiasis with retained stent (did not f/u to remove as per pt s/p d/c home), recurrent UTIs ESBL positive in the past 2/2 to straight catheterizations, HTN, PAD, and HSV endophthalmitis/keratitis s/p left corneal transplant who presented to [facility] on 10/28/19 with lethargy, weakness, malaise, and endorsing dark stools. Of note, patient has a history of recurrent GIB, and has been hospitalized multiple times with endoscopies, and CTA's which have all been without source of bleeding. On day of admission to OSH, the patient's hemoglobin was 5.2, and s/p 6 uPRBCs. S/p endoscopy on 10/31/19, found to have red blood in the gastric fundus and body but the source of the blood could not be found. 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 The patient was intubated and placed under ICU care for acute hypoxic respiratory failure and then extubated the next morning. Of note, pt was also being treated for UTI with meropenem given history of ESBL.  She was then transferred to regular floor and had 2 episodes of melena, underwent 1UpRBCs then underwent another EGD on 11/05/19 and had 2 clips placed at site of previous clippings.   Of note, during her hospitalization she was followed by urology and was planned to undergo laser lithotripsy and stent exchange on 11/7/19 but was unable to undergo procedure secondary to transfer.   Today, 11/7/19, patient underwent splenic and left gastric arterial embolizations with vascular surgery, Dr. Rock.     A/P:  Neurovascular: No delirium.  - Paraplegia with hx of spinal hematoma: continue neuro checks, c/w baclofen 20mg BID, oxycodone, lidoderm patch for pain.   - Continue duloxetine, gabapentin  - Tylenol PRN for pain.     Cardiovascular: Hemodynamically stable. HR controlled.   - Hx of aortic dissection s/p multiple repairs, EF 60-65% on TTE 8/2019, HTN, HLD.  - C/w labetolol 200mg PO BID, amlodipine 5mg PO qd, and atorvastatin 40mg PO qhs.   - Continue to monitor HR/BP/Tele.     Respiratory: 02 Sat = 98% on RA.  -If on oxygen wean to RA from for O2 Sat > 93%.  -Encourage C+DB and Use of IS 10x / hr while awake.  -CXR in AM    GI: Multiple GIB, s/p multiple endoscopies, most recent EGD on 11/5/19 - 2 clips placed at site of previous clips   - Vascular surgery following. Patient is POD 0  s/p procedure above.   - Continue to appreciate recs. Continue periop abx.   -PPX with pantoprazole IV BID  - Continue to trend H&H. Transfuse as needed, likely if Hgb<7    Renal / : BUN/Cr: 15/0.50  - Nephrolithiasis. Please consult urology in AM for laser lithotripsy and stent replacement.  - Neurogenic bladder, self cath q4-6hrs.  - UA positive, f/u UCx.   - Start abx.   -Monitor renal function.  -Monitor I/O's.    Endocrine:  no acute issues.   -A1c: 4.9  -TSH: 0.806    Hematologic: H&H stable, repeat labs upon arrival from OR  -CBC pending  - Likely transfuse if hgb<7    ID: afebrile.   - Complete periop abx.  - Start abx for uti  -Observe for SIRS/Sepsis Syndrome.    Prophylaxis:  -DVT prophylaxis w SCD's for now.   - Start SQH likely tomorrow, f/u with vascular recs.     Disposition:  - Home when medically stable. 67 y/o Female with h/o aortic dissection s/p multiple repairs, spinal hematoma resulting in paraplegia (on baclofen pump), nephrolithiasis with retained stent (did not f/u to remove as per pt s/p d/c home), recurrent UTIs ESBL positive in the past 2/2 to straight catheterizations, HTN, PAD, and HSV endophthalmitis/keratitis s/p left corneal transplant who presented to [facility] on 10/28/19 with lethargy, weakness, malaise, and endorsing dark stools. Of note, patient has a history of recurrent GIB, and has been hospitalized multiple times with endoscopies, and CTA's which have all been without source of bleeding. On day of admission to OSH, the patient's hemoglobin was 5.2, and s/p 6 uPRBCs. S/p endoscopy on 10/31/19, found to have red blood in the gastric fundus and body but the source of the blood could not be found. 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 The patient was intubated and placed under ICU care for acute hypoxic respiratory failure and then extubated the next morning. Of note, pt was also being treated for UTI with meropenem given history of ESBL.  She was then transferred to regular floor and had 2 episodes of melena, underwent 1UpRBCs then underwent another EGD on 11/05/19 and had 2 clips placed at site of previous clippings.   Of note, during her hospitalization she was followed by urology and was planned to undergo laser lithotripsy and stent exchange on 11/7/19 but was unable to undergo procedure secondary to transfer.   Today, 11/7/19, patient underwent splenic and left gastric arterial embolizations with vascular surgery, Dr. Rock.     A/P:  Neurovascular: No delirium.  - Paraplegia with hx of spinal hematoma: continue neuro checks, c/w baclofen 20mg BID, oxycodone, lidoderm patch for pain.   - Continue duloxetine, gabapentin  - Tylenol PRN for pain.     Cardiovascular: Hemodynamically stable. HR controlled.   - Hx of aortic dissection s/p multiple repairs, EF 60-65% on TTE 8/2019, HTN, HLD.  - C/w labetolol 200mg PO BID, amlodipine 5mg PO qd, and atorvastatin 40mg PO qhs.   - Continue to monitor HR/BP/Tele.     Respiratory: 02 Sat = 98% on RA.  -If on oxygen wean to RA from for O2 Sat > 93%.  -Encourage C+DB and Use of IS 10x / hr while awake.  -CXR in AM    GI: Multiple GIB, s/p multiple endoscopies, most recent EGD on 11/5/19 - 2 clips placed at site of previous clips   - Vascular surgery following. Patient is POD 0  s/p procedure above.   - Continue to appreciate recs. Continue periop abx.   -PPX with pantoprazole IV BID  - Continue to trend H&H. Transfuse as needed, likely if Hgb<7    Renal / : BUN/Cr: 15/0.50  - Nephrolithiasis. Please consult urology in AM for laser lithotripsy and stent replacement.  - Neurogenic bladder, self cath q4-6hrs.  -  UA positive on admission. patient has history of recurrent UTIs, clinically no leukocytosis, afebrile.      - meropenem was stopped at OSH per ID.  -Monitor renal function.  -Monitor I/O's.    Endocrine:  no acute issues.   -A1c: 4.9  -TSH: 0.806    Hematologic: H&H stable, repeat labs upon arrival from OR  -CBC pending  - Likely transfuse if hgb<7    ID: afebrile.   - Complete periop abx.  - Start abx for uti  -Observe for SIRS/Sepsis Syndrome.    Prophylaxis:  -DVT prophylaxis w SCD's for now.   - Start SQH likely tomorrow, f/u with vascular recs.     Disposition:  - Home when medically stable.

## 2019-11-07 NOTE — CONSULT NOTE ADULT - SUBJECTIVE AND OBJECTIVE BOX
Vascular Attending:        HPI:  67yo F h/o aortic dissection s/p multiple repairs, spinal hematoma resulting in paraplegia (on baclofen pump), nephrolithiasis with retained stent (did not f/u to remove as per pt s/p d/c home), recurrent UTIs ESBL positive in the past 2/2 to straight catheterizations, HTN, PAD, and HSV endophthalmitis/keratitis s/p left corneal transplant who presented to [facility] on 10/28 with lethargy, weakness, malaise. Per chart review, Pt denied any bright red bleeding per rectum, vomiting, diarrhea, nausea, abdominal pain, but did endorse dark stool. Of note, Pt has been hospitalized multiple times with endoscopies and capsule endoscopies, and CTA's which have all been without source of bleeding. 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 On day of admission the patient's hemoglobin was 5.2. The patient was transfused 6 units of PRBCs. The patient went for an endoscopy on 10/31 and was found to have red blood in the gastric fundus and body but the source of the blood could not be found. IR was consulted for an emergent arteriogram. 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 The patient was intubated and placed under ICU care for acute hypoxic respiratory failure and then extubated the next morning once she was hemodynamically stable. The patient's hemoglobin continued to decline. Pt was also being treated for UTI with meropenem given history of ESBL. Pt was then hemodynamically stable for transfer to [facility]. On day of transfer the patient had received 4 days worth of meropenem, and was discontinued upon transfer. Patient was transferred to [facility] MICU for further management of GI bleed as well as possible partial gastrectomy. In the ICU, GI consulted with recommendation to consult vascular surgery, who recs state that there are no surgical interventions for now. She was then transferred to regular floors and had 2 episodes of melena, underwent 1U pRBCs then underwent another EGD on 19 and had 2 clips placed at site of previous clippings. She remained stable after EGD and was placed on a regular diet.    During her hospitalization she was followed by urology and was planned to undergo laser lithotripsy and stent exchange on  but was unable to undergo procedure secondary to transfer. She was advised to have urology consult at [facility] to assess and attempt stent exchange to prevent further calcification. On 19 she was assessed by GI at [facility] (Dr. Ambrocio) who spoke with Dr. George Barriga who has agreed to attempt accessing the celiac and access the L gastric artery. and rule out aortoenteric fistula. 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 2018  UTI (urinary tract infection): recurrent  TIA (transient ischemic attack)  HTN (Hypertension)  History of corneal transplant: left corneal transplant on 2018  Disorder of spine: unthetethering 2 x  Presence of IVC filter:  ?  S/P aortic dissection repair: Type A Dissection repair /   descending aortic aneurysm repair 2016  H/O Spinal surgery: laminectomies       REVIEW OF SYSTEMS  Neg except as in HPI    MEDICATIONS  (STANDING):  amLODIPine   Tablet 5 milliGRAM(s) Oral daily  atorvastatin 40 milliGRAM(s) Oral at bedtime  baclofen 20 milliGRAM(s) Oral two times a day  gabapentin 600 milliGRAM(s) Oral three times a day  labetalol 200 milliGRAM(s) Oral two times a day  pantoprazole  Injectable 40 milliGRAM(s) IV Push two times a day  prednisoLONE acetate 1% Suspension 1 Drop(s) Both EYES four times a day  sodium chloride 2% Ophthalmic Solution 1 Drop(s) Both EYES daily    MEDICATIONS  (PRN):  oxyCODONE    IR 10 milliGRAM(s) Oral three times a day PRN Moderate Pain (4 - 6)  zolpidem 5 milliGRAM(s) Oral at bedtime PRN Insomnia      Allergies  No Known Allergies  Intolerances    FAMILY HISTORY:  No pertinent family history in first degree relatives: pt does not recall family history of medical problems in mother or father, both       Vital Signs Last 24 Hrs  T(C): 36.8 (2019 05:01), Max: 37.2 (2019 22:28)  T(F): 98.2 (2019 05:01), Max: 99 (2019 22:28)  HR: 66 (2019 00:20) (66 - 81)  BP: 146/66 (2019 00:20) (116/55 - 146/66)  BP(mean): 95 (2019 00:20) (87 - 95)  RR: 16 (2019 00:20) (16 - 18)  SpO2: 94% (2019 00:20) (94% - 96%)    PHYSICAL EXAM:  Constitutional: AXOX3  Respiratory: Unlabored  Cardiovascular: S1S2  Gastrointestinal: soft nontender  Extremities: paraplegic  Vascular: 2+ DP    LABS:                        8.4    5.18  )-----------( 200      ( 2019 23:32 )             26.8         136  |  102  |  15  ----------------------------<  106<H>  4.1   |  25  |  0.50    Ca    8.2<L>      2019 23:32  Phos  3.1     -  Mg     1.9         TPro  4.9<L>  /  Alb  2.7<L>  /  TBili  0.4  /  DBili  x   /  AST  14  /  ALT  11  /  AlkPhos  75  11-    PTT - ( 2019 23:32 )  PTT:33.2 sec      RADIOLOGY & ADDITIONAL STUDIES Vascular Attending:  Mendoza      HPI:  67yo F h/o aortic dissection s/p multiple repairs, spinal hematoma resulting in paraplegia (on baclofen pump), nephrolithiasis with retained stent (did not f/u to remove as per pt s/p d/c home), recurrent UTIs ESBL positive in the past 2/2 to straight catheterizations, HTN, PAD, and HSV endophthalmitis/keratitis s/p left corneal transplant who presented to [facility] on 10/28 with lethargy, weakness, malaise. Per chart review, Pt denied any bright red bleeding per rectum, vomiting, diarrhea, nausea, abdominal pain, but did endorse dark stool. Of note, Pt has been hospitalized multiple times with endoscopies and capsule endoscopies, and CTA's which have all been without source of bleeding. 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 On day of admission the patient's hemoglobin was 5.2. The patient was transfused 6 units of PRBCs. The patient went for an endoscopy on 10/31 and was found to have red blood in the gastric fundus and body but the source of the blood could not be found. IR was consulted for an emergent arteriogram. 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 The patient was intubated and placed under ICU care for acute hypoxic respiratory failure and then extubated the next morning once she was hemodynamically stable. The patient's hemoglobin continued to decline. Pt was also being treated for UTI with meropenem given history of ESBL. Pt was then hemodynamically stable for transfer to [facility]. On day of transfer the patient had received 4 days worth of meropenem, and was discontinued upon transfer. Patient was transferred to [facility] MICU for further management of GI bleed as well as possible partial gastrectomy. In the ICU, GI consulted with recommendation to consult vascular surgery, who recs state that there are no surgical interventions for now. She was then transferred to regular floors and had 2 episodes of melena, underwent 1U pRBCs then underwent another EGD on 19 and had 2 clips placed at site of previous clippings. She remained stable after EGD and was placed on a regular diet.    During her hospitalization she was followed by urology and was planned to undergo laser lithotripsy and stent exchange on  but was unable to undergo procedure secondary to transfer. She was advised to have urology consult at [facility] to assess and attempt stent exchange to prevent further calcification. On 19 she was assessed by GI at [facility] (Dr. Ambrocio) who spoke with Dr. George Barriga who has agreed to attempt accessing the celiac and access the L gastric artery. and rule out aortoenteric fistula. 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 2018  UTI (urinary tract infection): recurrent  TIA (transient ischemic attack)  HTN (Hypertension)  History of corneal transplant: left corneal transplant on 2018  Disorder of spine: unthetethering 2 x  Presence of IVC filter:  ?  S/P aortic dissection repair: Type A Dissection repair /   descending aortic aneurysm repair 2016  H/O Spinal surgery: laminectomies       REVIEW OF SYSTEMS  Neg except as in HPI    MEDICATIONS  (STANDING):  amLODIPine   Tablet 5 milliGRAM(s) Oral daily  atorvastatin 40 milliGRAM(s) Oral at bedtime  baclofen 20 milliGRAM(s) Oral two times a day  gabapentin 600 milliGRAM(s) Oral three times a day  labetalol 200 milliGRAM(s) Oral two times a day  pantoprazole  Injectable 40 milliGRAM(s) IV Push two times a day  prednisoLONE acetate 1% Suspension 1 Drop(s) Both EYES four times a day  sodium chloride 2% Ophthalmic Solution 1 Drop(s) Both EYES daily    MEDICATIONS  (PRN):  oxyCODONE    IR 10 milliGRAM(s) Oral three times a day PRN Moderate Pain (4 - 6)  zolpidem 5 milliGRAM(s) Oral at bedtime PRN Insomnia      Allergies  No Known Allergies  Intolerances    FAMILY HISTORY:  No pertinent family history in first degree relatives: pt does not recall family history of medical problems in mother or father, both       Vital Signs Last 24 Hrs  T(C): 36.8 (2019 05:01), Max: 37.2 (2019 22:28)  T(F): 98.2 (2019 05:01), Max: 99 (2019 22:28)  HR: 66 (2019 00:20) (66 - 81)  BP: 146/66 (2019 00:20) (116/55 - 146/66)  BP(mean): 95 (2019 00:20) (87 - 95)  RR: 16 (2019 00:20) (16 - 18)  SpO2: 94% (2019 00:20) (94% - 96%)    PHYSICAL EXAM:  Constitutional: AXOX3  Respiratory: Unlabored  Cardiovascular: S1S2  Gastrointestinal: soft nontender  Extremities: paraplegic  Vascular: 2+ DP    LABS:                        8.4    5.18  )-----------( 200      ( 2019 23:32 )             26.8     -    136  |  102  |  15  ----------------------------<  106<H>  4.1   |  25  |  0.50    Ca    8.2<L>      2019 23:32  Phos  3.1     -  Mg     1.9         TPro  4.9<L>  /  Alb  2.7<L>  /  TBili  0.4  /  DBili  x   /  AST  14  /  ALT  11  /  AlkPhos  75  11-    PTT - ( 2019 23:32 )  PTT:33.2 sec      RADIOLOGY & ADDITIONAL STUDIES

## 2019-11-07 NOTE — DIETITIAN INITIAL EVALUATION ADULT. - PERTINENT LABORATORY DATA
(11/6) glucose 106 (H), Ca 8.2 (L), Total cholesterol/HDL ratio 2.2 (L), CRP 1.59 (H), A1c 4.9% ([facility])

## 2019-11-07 NOTE — CHART NOTE - NSCHARTNOTEFT_GEN_A_CORE
Upon Nutritional Assessment by the Registered Dietitian your patient was determined to meet criteria / has evidence of the following diagnosis/diagnoses:          [ ]  Mild Protein Calorie Malnutrition        [ ]  Moderate Protein Calorie Malnutrition        [ ] Severe Protein Calorie Malnutrition        [ ] Unspecified Protein Calorie Malnutrition        [ X ] Underweight / BMI <19        [ ] Morbid Obesity / BMI > 40      Findings as based on:  •  Comprehensive nutrition assessment and consultation  Ht: 67in Wt(11/6): 105lbs IBW: 135lbs (+/-10%), 78%IBW, BMI: 16.4 kg/m2       Treatment:    The following diet has been recommended:  1) Recommend advancing to low-fiber diet when medically feasible.   2) Obtain and monitor wt trends.   3) Monitor lytes and replete prn.   4) RD to f/u for further assessment, NFPE, nutrition education.    PROVIDER Section:     By signing this assessment you are acknowledging and agree with the diagnosis/diagnoses assigned by the Registered Dietitian    Comments:

## 2019-11-07 NOTE — PROGRESS NOTE ADULT - SUBJECTIVE AND OBJECTIVE BOX
Patient discussed on morning rounds with       Operation / Date: 19 arterial embolization with Dr. Rock    SUBJECTIVE ASSESSMENT:  68y Female seen and examined this AM. Patient not offering any acute complaints. Patient is NPO awaiting procedure.         Vital Signs Last 24 Hrs  T(C): 36.9 (2019 08:57), Max: 37.2 (2019 22:28)  T(F): 98.4 (2019 08:57), Max: 99 (2019 22:28)  HR: 64 (2019 11:32) (64 - 80)  BP: 138/68 (2019 11:32) (131/60 - 168/73)  BP(mean): 85 (2019 11:32) (85 - 118)  RR: 18 (2019 08:57) (16 - 18)  SpO2: 94% (2019 11:32) (94% - 95%)  I&O's Detail    2019 07:  -  2019 07:00  --------------------------------------------------------  IN:    Oral Fluid: 120 mL  Total IN: 120 mL    OUT:  Total OUT: 0 mL    Total NET: 120 mL      2019 07:01  -  2019 17:27  --------------------------------------------------------  IN:  Total IN: 0 mL    OUT:    Intermittent Catheterization - Urethral: 400 mL  Total OUT: 400 mL    Total NET: -400 mL          CHEST TUBE: No.   PETTY DRAIN:  No.  EPICARDIAL WIRES: No.  TIE DOWNS: No.  CLARKE: No.    PHYSICAL EXAM:    General: Paraplegic patient lying in bed, no acute distress     Neurological: Alert and oriented. No tone or bulk of LE.     Cardiovascular: S1S2, RRR, no murmurs appreciated on exam     Respiratory: Clear to ausculation bilaterally, no w/r/r    Gastrointestinal: Abdomen soft, non tender, non distended     Extremities: Warm and well perfused. No edema or calf tenderness     Vascular: Peripheral pulses palpable    LABS:                        8.4    5.18  )-----------( 200      ( 2019 23:32 )             26.8       COUMADIN:  No.   PTT - ( 2019 23:32 )  PTT:33.2 sec        136  |  102  |  15  ----------------------------<  106<H>  4.1   |  25  |  0.50    Ca    8.2<L>      2019 23:32  Phos  3.1       Mg     1.9         TPro  4.9<L>  /  Alb  2.7<L>  /  TBili  0.4  /  DBili  x   /  AST  14  /  ALT  11  /  AlkPhos  75        Urinalysis Basic - ( 2019 05:37 )    Color: Yellow / Appearance: Clear / S.010 / pH: x  Gluc: x / Ketone: NEGATIVE  / Bili: Negative / Urobili: 0.2 E.U./dL   Blood: x / Protein: NEGATIVE mg/dL / Nitrite: POSITIVE   Leuk Esterase: Large / RBC: < 5 /HPF / WBC Many /HPF   Sq Epi: x / Non Sq Epi: 0-5 /HPF / Bacteria: Many /HPF        MEDICATIONS  (STANDING):  amLODIPine   Tablet 5 milliGRAM(s) Oral daily  atorvastatin 40 milliGRAM(s) Oral at bedtime  baclofen 20 milliGRAM(s) Oral two times a day  ceFAZolin   IVPB 1000 milliGRAM(s) IV Intermittent every 8 hours  gabapentin 600 milliGRAM(s) Oral three times a day  labetalol 200 milliGRAM(s) Oral two times a day  pantoprazole  Injectable 40 milliGRAM(s) IV Push two times a day  prednisoLONE acetate 1% Suspension 1 Drop(s) Both EYES four times a day  sodium chloride 0.9% lock flush 3 milliLiter(s) IV Push every 8 hours  sodium chloride 2% Ophthalmic Solution 1 Drop(s) Both EYES daily    MEDICATIONS  (PRN):  ondansetron Injectable 4 milliGRAM(s) IV Push every 4 hours PRN Nausea and/or Vomiting  oxyCODONE    IR 10 milliGRAM(s) Oral three times a day PRN Moderate Pain (4 - 6)  zolpidem 5 milliGRAM(s) Oral at bedtime PRN Insomnia        RADIOLOGY & ADDITIONAL TESTS: Patient discussed on morning rounds with       Operation / Date: 19 arterial embolization with Dr. Rock    SUBJECTIVE ASSESSMENT:  68y Female seen and examined this AM. Patient not offering any acute complaints. Patient is NPO awaiting procedure.         Vital Signs Last 24 Hrs  T(C): 36.9 (2019 08:57), Max: 37.2 (2019 22:28)  T(F): 98.4 (2019 08:57), Max: 99 (2019 22:28)  HR: 64 (2019 11:32) (64 - 80)  BP: 138/68 (2019 11:32) (131/60 - 168/73)  BP(mean): 85 (2019 11:32) (85 - 118)  RR: 18 (2019 08:57) (16 - 18)  SpO2: 94% (2019 11:32) (94% - 95%)  I&O's Detail    2019 07:  -  2019 07:00  --------------------------------------------------------  IN:    Oral Fluid: 120 mL  Total IN: 120 mL    OUT:  Total OUT: 0 mL    Total NET: 120 mL      2019 07:01  -  2019 17:27  --------------------------------------------------------  IN:  Total IN: 0 mL    OUT:    Intermittent Catheterization - Urethral: 400 mL  Total OUT: 400 mL    Total NET: -400 mL          CHEST TUBE: No.   PETTY DRAIN:  No.  EPICARDIAL WIRES: No.  TIE DOWNS: No.  CLARKE: No.    PHYSICAL EXAM:    General: Paraplegic patient lying in bed, no acute distress     Neurological: Alert and oriented. No tone or bulk of LE.     Cardiovascular: S1S2, RRR, no murmurs appreciated on exam     Respiratory: Clear to ausculation bilaterally, no w/r/r    Gastrointestinal: Abdomen soft, non tender, non distended     Extremities: Warm and well perfused. No edema or calf tenderness     Vascular: Peripheral pulses palpable    LABS:                        8.4    5.18  )-----------( 200      ( 2019 23:32 )             26.8       COUMADIN:  No.   PTT - ( 2019 23:32 )  PTT:33.2 sec        136  |  102  |  15  ----------------------------<  106<H>  4.1   |  25  |  0.50    Ca    8.2<L>      2019 23:32  Phos  3.1       Mg     1.9         TPro  4.9<L>  /  Alb  2.7<L>  /  TBili  0.4  /  DBili  x   /  AST  14  /  ALT  11  /  AlkPhos  75        Urinalysis Basic - ( 2019 05:37 )    Color: Yellow / Appearance: Clear / S.010 / pH: x  Gluc: x / Ketone: NEGATIVE  / Bili: Negative / Urobili: 0.2 E.U./dL   Blood: x / Protein: NEGATIVE mg/dL / Nitrite: POSITIVE   Leuk Esterase: Large / RBC: < 5 /HPF / WBC Many /HPF   Sq Epi: x / Non Sq Epi: 0-5 /HPF / Bacteria: Many /HPF        MEDICATIONS  (STANDING):  amLODIPine   Tablet 5 milliGRAM(s) Oral daily  atorvastatin 40 milliGRAM(s) Oral at bedtime  baclofen 20 milliGRAM(s) Oral two times a day  ceFAZolin   IVPB 1000 milliGRAM(s) IV Intermittent every 8 hours  gabapentin 600 milliGRAM(s) Oral three times a day  labetalol 200 milliGRAM(s) Oral two times a day  pantoprazole  Injectable 40 milliGRAM(s) IV Push two times a day  prednisoLONE acetate 1% Suspension 1 Drop(s) Both EYES four times a day  sodium chloride 0.9% lock flush 3 milliLiter(s) IV Push every 8 hours  sodium chloride 2% Ophthalmic Solution 1 Drop(s) Both EYES daily    MEDICATIONS  (PRN):  ondansetron Injectable 4 milliGRAM(s) IV Push every 4 hours PRN Nausea and/or Vomiting  oxyCODONE    IR 10 milliGRAM(s) Oral three times a day PRN Moderate Pain (4 - 6)  zolpidem 5 milliGRAM(s) Oral at bedtime PRN Insomnia        RADIOLOGY & ADDITIONAL TESTS:  < from: Upper Endoscopy (19 @ 15:43) >  Impression:          - No source for bleeding seen.                       - Cipls seen from prior endoscopy in an inflamed area. Two additonal clips                       placed. Epinephrine injected.    < end of copied text >

## 2019-11-07 NOTE — BRIEF OPERATIVE NOTE - OPERATION/FINDINGS
Right common femoral artery access with 5F sheath. Mesenteric angiogram showed large collateral network filling early from celiac artery and SMA into splenic vein, SMV, and portal venous system. The SMA was cannulated, a microcatheter was advanced up the inferior pancreaticoduodenal artery into gastroduodenal artery, then into celiac axis. The splenic artery was then cannulated, and this was coil embolized. The left gastric artery was then cannulated, and this was embolized with microspheres. Completion SMA angiogram showed no filling of splenic vein and more appropriately delayed filling of portal vein.

## 2019-11-07 NOTE — DIETITIAN INITIAL EVALUATION ADULT. - ENERGY NEEDS
Ht: 67in Wt(11/6): 105lbs IBW: 135lbs (+/-10%), 78%IBW, BMI: 16.4 kg/m2   IBW (61.2 kg) used for calculations as pt is currently <80% of IBW.  Nutrient needs based on [facility] standards of care for older adults. Needs adjusted for repletion. Ht: 67in Wt(11/6): 105lbs IBW: 135lbs (+/-10%), 78%IBW, BMI: 16.4 kg/m2   IBW (61.2 kg) used for calculations as pt is currently <80% of IBW.   Nutrient needs based on [facility] standards of care for older adults. Needs adjusted for paraplegia.

## 2019-11-07 NOTE — DIETITIAN INITIAL EVALUATION ADULT. - DIET TYPE
Diet, NPO after Midnight:      NPO Start Date: 06-Nov-2019,   NPO Start Time: 23:59 (11-06-19 @ 22:52)

## 2019-11-08 NOTE — PROGRESS NOTE ADULT - SUBJECTIVE AND OBJECTIVE BOX
Patient discussed on morning rounds and seen at bedside with Dr. Barriga    Operation / Date: 19 arterial embolization with Dr. Rock    SUBJECTIVE ASSESSMENT:  68y Female seen and examined bedside. Patient is not offering any acute complaints. Denies chest pain, SOB, palpitations, hemopytsis, N/V/D, abdominal pain, dizziness, fever or chills, melena or hematemesis.       Vital Signs Last 24 Hrs  T(C): 37.6 (2019 14:06), Max: 37.6 (2019 14:06)  T(F): 99.6 (2019 14:06), Max: 99.6 (2019 14:06)  HR: 82 (2019 14:25) (60 - 94)  BP: 101/51 (2019 14:25) (101/51 - 146/65)  BP(mean): 77 (2019 11:28) (77 - 103)  RR: 18 (2019 14:25) (16 - 18)  SpO2: 94% (2019 14:25) (94% - 95%)  I&O's Detail    2019 07:01  -  2019 07:00  --------------------------------------------------------  IN:    IV PiggyBack: 100 mL    Oral Fluid: 290 mL  Total IN: 390 mL    OUT:    Intermittent Catheterization - Urethral: 1050 mL  Total OUT: 1050 mL    Total NET: -660 mL      2019 07:01  -  2019 15:17  --------------------------------------------------------  IN:  Total IN: 0 mL    OUT:    Intermittent Catheterization - Urethral: 400 mL    Voided: 200 mL  Total OUT: 600 mL    Total NET: -600 mL          CHEST TUBE: No.   PETTY DRAIN:  No.  EPICARDIAL WIRES: No.  TIE DOWNS: No.  CLARKE: No.    PHYSICAL EXAM:    General: Paraplegic patient lying in bed, no acute distress     Neurological: Alert and oriented. No tone or bulk of LE.     Cardiovascular: S1S2, RRR, no murmurs appreciated on exam     Respiratory: Clear to ausculation bilaterally, no w/r/r    Gastrointestinal: Abdomen soft, non tender, non distended     Extremities: Warm and well perfused. No edema or calf tenderness     Vascular: Peripheral pulses palpable    Incisions: groin sites c/d/i, soft.       LABS:                        8.8    6.80  )-----------( 230      ( 2019 07:49 )             28.8       COUMADIN: No.   PTT - ( 2019 23:32 )  PTT:33.2 sec        141  |  105  |  13  ----------------------------<  104<H>  4.4   |  28  |  0.48<L>    Ca    8.7      2019 07:49  Phos  3.0     11-  Mg     1.9         TPro  5.0<L>  /  Alb  3.0<L>  /  TBili  0.3  /  DBili  x   /  AST  12  /  ALT  8<L>  /  AlkPhos  72        Urinalysis Basic - ( 2019 05:37 )    Color: Yellow / Appearance: Clear / S.010 / pH: x  Gluc: x / Ketone: NEGATIVE  / Bili: Negative / Urobili: 0.2 E.U./dL   Blood: x / Protein: NEGATIVE mg/dL / Nitrite: POSITIVE   Leuk Esterase: Large / RBC: < 5 /HPF / WBC Many /HPF   Sq Epi: x / Non Sq Epi: 0-5 /HPF / Bacteria: Many /HPF        MEDICATIONS  (STANDING):  amLODIPine   Tablet 5 milliGRAM(s) Oral daily  atorvastatin 40 milliGRAM(s) Oral at bedtime  baclofen 20 milliGRAM(s) Oral two times a day  DULoxetine 20 milliGRAM(s) Oral two times a day  gabapentin 600 milliGRAM(s) Oral three times a day  labetalol 200 milliGRAM(s) Oral two times a day  pantoprazole  Injectable 40 milliGRAM(s) IV Push two times a day  prednisoLONE acetate 1% Suspension 1 Drop(s) Both EYES four times a day  sodium chloride 0.9% lock flush 3 milliLiter(s) IV Push every 8 hours  sodium chloride 2% Ophthalmic Solution 1 Drop(s) Both EYES daily  valACYclovir 1000 milliGRAM(s) Oral three times a day    MEDICATIONS  (PRN):  lidocaine   Patch 1 Patch Transdermal daily PRN pain  ondansetron Injectable 4 milliGRAM(s) IV Push every 4 hours PRN Nausea and/or Vomiting  oxyCODONE    IR 10 milliGRAM(s) Oral three times a day PRN Moderate Pain (4 - 6)  zolpidem 5 milliGRAM(s) Oral at bedtime PRN Insomnia        RADIOLOGY & ADDITIONAL TESTS:    < from: Xray Chest 1 View- PORTABLE-Routine (19 @ 23:37) >    EXAM:  XR CHEST PORTABLE ROUTINE 1V                          PROCEDURE DATE:  2019          INTERPRETATION:  CHEST PA AND LATERAL    History: Preoperative evaluation    Prior studies: 2019    Findings: Basilar atelectases in the left lower lung zone with elevated   hemidiaphragm. No pleural effusion or pneumothorax.  Cardiomediastinal   silhouettes are within normal limits. Multiple surgical clips projecting   over the mediastinum, likely related to CABG.Multiple fracture   deformities in the left ribs. Status post median sternotomy with intact   sternal wires.    Impression: No pneumonia.    < end of copied text >  < from: Xray Chest 1 View- PORTABLE-Routine (19 @ 23:37) >    EXAM:  XR CHEST PORTABLE ROUTINE 1V                          PROCEDURE DATE:  2019          INTERPRETATION:  CHEST PA AND LATERAL    History: Preoperative evaluation    Prior studies: 2019    Findings: Basilar atelectases in the left lower lung zone with elevated   hemidiaphragm. No pleural effusion or pneumothorax.  Cardiomediastinal   silhouettes are within normal limits. Multiple surgical clips projecting   over the mediastinum, likely related to CABG.Multiple fracture   deformities in the left ribs. Status post median sternotomy with intact   sternal wires.    Impression: No pneumonia.    < end of copied text > Patient discussed on morning rounds and seen at bedside with Dr. Barriga    Operation / Date: 19 arterial embolization with Dr. Rock    SUBJECTIVE ASSESSMENT:  68y Female seen and examined bedside. Patient is not offering any acute complaints. Denies chest pain, SOB, palpitations, hemopytsis, N/V/D, abdominal pain, dizziness, fever or chills, melena or hematemesis.       Vital Signs Last 24 Hrs  T(C): 37.6 (2019 14:06), Max: 37.6 (2019 14:06)  T(F): 99.6 (2019 14:06), Max: 99.6 (2019 14:06)  HR: 82 (2019 14:25) (60 - 94)  BP: 101/51 (2019 14:25) (101/51 - 146/65)  BP(mean): 77 (2019 11:28) (77 - 103)  RR: 18 (2019 14:25) (16 - 18)  SpO2: 94% (2019 14:25) (94% - 95%)  I&O's Detail    2019 07:01  -  2019 07:00  --------------------------------------------------------  IN:    IV PiggyBack: 100 mL    Oral Fluid: 290 mL  Total IN: 390 mL    OUT:    Intermittent Catheterization - Urethral: 1050 mL  Total OUT: 1050 mL    Total NET: -660 mL      2019 07:01  -  2019 15:17  --------------------------------------------------------  IN:  Total IN: 0 mL    OUT:    Intermittent Catheterization - Urethral: 400 mL    Voided: 200 mL  Total OUT: 600 mL    Total NET: -600 mL          CHEST TUBE: No.   PETTY DRAIN:  No.  EPICARDIAL WIRES: No.  TIE DOWNS: No.  CLARKE: No.    PHYSICAL EXAM:    General: Paraplegic patient lying in bed, no acute distress     Neurological: Alert and oriented. No tone or bulk of LE.     Cardiovascular: S1S2, RRR, no murmurs appreciated on exam     Respiratory: Clear to ausculation bilaterally, no w/r/r    Gastrointestinal: +LLQ baclofen pump, otherwise Abdomen soft, non tender, non distended     Extremities: Warm and well perfused. No edema or calf tenderness     Vascular: Peripheral pulses palpable    Incisions: groin sites c/d/i, soft.       LABS:                        8.8    6.80  )-----------( 230      ( 2019 07:49 )             28.8       COUMADIN: No.   PTT - ( 2019 23:32 )  PTT:33.2 sec        141  |  105  |  13  ----------------------------<  104<H>  4.4   |  28  |  0.48<L>    Ca    8.7      2019 07:49  Phos  3.0     11-  Mg     1.9         TPro  5.0<L>  /  Alb  3.0<L>  /  TBili  0.3  /  DBili  x   /  AST  12  /  ALT  8<L>  /  AlkPhos  72  -      Urinalysis Basic - ( 2019 05:37 )    Color: Yellow / Appearance: Clear / S.010 / pH: x  Gluc: x / Ketone: NEGATIVE  / Bili: Negative / Urobili: 0.2 E.U./dL   Blood: x / Protein: NEGATIVE mg/dL / Nitrite: POSITIVE   Leuk Esterase: Large / RBC: < 5 /HPF / WBC Many /HPF   Sq Epi: x / Non Sq Epi: 0-5 /HPF / Bacteria: Many /HPF        MEDICATIONS  (STANDING):  amLODIPine   Tablet 5 milliGRAM(s) Oral daily  atorvastatin 40 milliGRAM(s) Oral at bedtime  baclofen 20 milliGRAM(s) Oral two times a day  DULoxetine 20 milliGRAM(s) Oral two times a day  gabapentin 600 milliGRAM(s) Oral three times a day  labetalol 200 milliGRAM(s) Oral two times a day  pantoprazole  Injectable 40 milliGRAM(s) IV Push two times a day  prednisoLONE acetate 1% Suspension 1 Drop(s) Both EYES four times a day  sodium chloride 0.9% lock flush 3 milliLiter(s) IV Push every 8 hours  sodium chloride 2% Ophthalmic Solution 1 Drop(s) Both EYES daily  valACYclovir 1000 milliGRAM(s) Oral three times a day    MEDICATIONS  (PRN):  lidocaine   Patch 1 Patch Transdermal daily PRN pain  ondansetron Injectable 4 milliGRAM(s) IV Push every 4 hours PRN Nausea and/or Vomiting  oxyCODONE    IR 10 milliGRAM(s) Oral three times a day PRN Moderate Pain (4 - 6)  zolpidem 5 milliGRAM(s) Oral at bedtime PRN Insomnia        RADIOLOGY & ADDITIONAL TESTS:    < from: Xray Chest 1 View- PORTABLE-Routine (19 @ 23:37) >    EXAM:  XR CHEST PORTABLE ROUTINE 1V                          PROCEDURE DATE:  2019          INTERPRETATION:  CHEST PA AND LATERAL    History: Preoperative evaluation    Prior studies: 2019    Findings: Basilar atelectases in the left lower lung zone with elevated   hemidiaphragm. No pleural effusion or pneumothorax.  Cardiomediastinal   silhouettes are within normal limits. Multiple surgical clips projecting   over the mediastinum, likely related to CABG.Multiple fracture   deformities in the left ribs. Status post median sternotomy with intact   sternal wires.    Impression: No pneumonia.    < end of copied text >  < from: Xray Chest 1 View- PORTABLE-Routine (19 @ 23:37) >    EXAM:  XR CHEST PORTABLE ROUTINE 1V                          PROCEDURE DATE:  2019          INTERPRETATION:  CHEST PA AND LATERAL    History: Preoperative evaluation    Prior studies: 2019    Findings: Basilar atelectases in the left lower lung zone with elevated   hemidiaphragm. No pleural effusion or pneumothorax.  Cardiomediastinal   silhouettes are within normal limits. Multiple surgical clips projecting   over the mediastinum, likely related to CABG.Multiple fracture   deformities in the left ribs. Status post median sternotomy with intact   sternal wires.    Impression: No pneumonia.    < end of copied text >

## 2019-11-08 NOTE — PROGRESS NOTE ADULT - ASSESSMENT
67yo F h/o aortic dissection s/p multiple repairs, spinal hematoma resulting in paraplegia admitted to r/o Aortoenteric fistula, s/p mesenteric angio with embolization of splenic and L gastric arteries    - Will continue to follow  - Continue care/dispo per primary team  - Call [phone] for questions

## 2019-11-08 NOTE — PROGRESS NOTE ADULT - SUBJECTIVE AND OBJECTIVE BOX
Vascular Surgery Consult - Progress Note    Subjective:  No acute complaints. Denies pain. Denies N/V. No bloody BMs. States that she feels fine today.    Vital Signs Last 24 Hrs  T(C): 37.1 (2019 05:01), Max: 37.5 (2019 01:01)  T(F): 98.8 (2019 05:01), Max: 99.5 (2019 01:01)  HR: 78 (2019 11:) (60 - 94)  BP: 109/52 (:) (109/52 - 146/65)  BP(mean): 77 (:) (77 - 103)  RR: 18 (:) (16 - 18)  SpO2: 95% (:) (94% - 95%)    I&O's Summary    2019 07:01  -  2019 07:00  --------------------------------------------------------  IN: 390 mL / OUT: 1050 mL / NET: -660 mL    2019 07:01  -  2019 13:08  --------------------------------------------------------  IN: 0 mL / OUT: 100 mL / NET: -100 mL    Physical Exam:  Constitutional: AXOX3  Respiratory: Unlabored  Cardiovascular: S1S2  Gastrointestinal: soft nontender  Extremities: paraplegic; groins soft  Vascular: 2+ DP    LABS:                    8.8    6.80  )-----------( 230      ( 2019 07:49 )             28.8     11-08  141  |  105  |  13  ----------------------------<  104<H>  4.4   |  28  |  0.48<L>    Ca    8.7      2019 07:49  Phos  3.0     11-07  Mg     1.9         TPro  5.0<L>  /  Alb  3.0<L>  /  TBili  0.3  /  DBili  x   /  AST  12  /  ALT  8<L>  /  AlkPhos  72    PTT - ( 2019 23:32 )  PTT:33.2 sec    Urinalysis Basic - ( 2019 05:37 )  Color: Yellow / Appearance: Clear / S.010 / pH: x  Gluc: x / Ketone: NEGATIVE  / Bili: Negative / Urobili: 0.2 E.U./dL   Blood: x / Protein: NEGATIVE mg/dL / Nitrite: POSITIVE   Leuk Esterase: Large / RBC: < 5 /HPF / WBC Many /HPF   Sq Epi: x / Non Sq Epi: 0-5 /HPF / Bacteria: Many /HPF    LIVER FUNCTIONS - ( 2019 07:49 )  Alb: 3.0 g/dL / Pro: 5.0 g/dL / ALK PHOS: 72 U/L / ALT: 8 U/L / AST: 12 U/L / GGT: x

## 2019-11-08 NOTE — PROGRESS NOTE ADULT - ASSESSMENT
69 y/o Female with h/o aortic dissection s/p multiple repairs, spinal hematoma resulting in paraplegia (on baclofen pump), nephrolithiasis with retained stent (did not f/u to remove as per pt s/p d/c home), recurrent UTIs ESBL positive in the past 2/2 to straight catheterizations, HTN, PAD, and HSV endophthalmitis/keratitis s/p left corneal transplant who presented to [facility] on 10/28/19 with lethargy, weakness, malaise, and endorsing dark stools. Of note, patient has a history of recurrent GIB, and has been hospitalized multiple times with endoscopies, and CTA's which have all been without source of bleeding. On day of admission to OSH, the patient's hemoglobin was 5.2, and s/p 6 uPRBCs. S/p endoscopy on 10/31/19, found to have red blood in the gastric fundus and body but the source of the blood could not be found. 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 The patient was intubated and placed under ICU care for acute hypoxic respiratory failure and then extubated the next morning. Of note, pt was also being treated for UTI with meropenem given history of ESBL.  She was then transferred to regular floor and had 2 episodes of melena, underwent 1UpRBCs then underwent another EGD on 11/05/19 and had 2 clips placed at site of previous clippings.   Of note, during her hospitalization she was followed by urology and was planned to undergo laser lithotripsy and stent exchange on 11/7/19 but was unable to undergo procedure secondary to transfer.   On 11/7/19, patient underwent splenic and left gastric arterial embolizations with vascular surgery, Dr. Rcok.     A/P:  Neurovascular: No delirium.  - Paraplegia with hx of spinal hematoma: continue neuro checks, c/w baclofen 20mg BID, oxycodone, lidoderm patch for pain.   - Continue duloxetine, gabapentin  - Tylenol PRN for pain.     Cardiovascular: Hemodynamically stable. HR controlled.   - Hx of aortic dissection s/p multiple repairs, EF 60-65% on TTE 8/2019, HTN, HLD.  - C/w labetolol 200mg PO BID, amlodipine 5mg PO qd, and atorvastatin 40mg PO qhs.   - Continue to monitor HR/BP/Tele.     Respiratory: 02 Sat = 98% on RA.  -If on oxygen wean to RA from for O2 Sat > 93%.  -Encourage C+DB and Use of IS 10x / hr while awake.  -CXR in AM    GI: Multiple GIB, s/p multiple endoscopies, most recent EGD on 11/5/19 - 2 clips placed at site of previous clips   - Vascular surgery following. Patient is POD 1  s/p procedure above.   - Continue to appreciate recs. Continue periop abx.   -PPX with pantoprazole IV BID  - Continue to trend H&H. Stable.    Renal / : BUN/Cr: 13/0.48  - Nephrolithiasis. Needs laser lithotripsy and stent replacement outpatient. Needs follow up with outpt Urology.    - Above discussed with Dr. Barriga.  - Neurogenic bladder, self cath q4-6hrs.  -  UA positive on admission. patient has history of recurrent UTIs, clinically no leukocytosis, afebrile.      - meropenem was stopped at OSH per ID.  -Monitor renal function.  -Monitor I/O's.    Endocrine:  no acute issues.   -A1c: 4.9  -TSH: 0.806    Hematologic: H&H stable  -CBC pending  - Consider transfusion if hgb<7    ID: afebrile.   - Complete periop abx.  -Observe for SIRS/Sepsis Syndrome.    Prophylaxis:  -DVT prophylaxis w SQH and SCDs.    Disposition:  - Home when medically stable.

## 2019-11-09 NOTE — PROGRESS NOTE ADULT - ASSESSMENT
67yo F h/o aortic dissection s/p multiple repairs, spinal hematoma resulting in paraplegia admitted to r/o Aortoenteric fistula, s/p mesenteric angio with embolization of splenic and L gastric arteries    - Will continue to follow  - Continue care/dispo per primary team  - Call [phone] for questions

## 2019-11-09 NOTE — PROGRESS NOTE ADULT - ASSESSMENT
67 y/o Female with h/o aortic dissection s/p multiple repairs, spinal hematoma resulting in paraplegia (on baclofen pump), nephrolithiasis with retained stent (did not f/u to remove as per pt s/p d/c home), recurrent UTIs ESBL positive in the past 2/2 to straight catheterizations, HTN, PAD, and HSV endophthalmitis/keratitis s/p left corneal transplant who presented to [facility] on 10/28/19 with lethargy, weakness, malaise, and endorsing dark stools. Of note, patient has a history of recurrent GIB, and has been hospitalized multiple times with endoscopies, and CTA's which have all been without source of bleeding. On day of admission to OSH, the patient's hemoglobin was 5.2, and s/p 6 uPRBCs. S/p endoscopy on 10/31/19, found to have red blood in the gastric fundus and body but the source of the blood could not be found. 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 The patient was intubated and placed under ICU care for acute hypoxic respiratory failure and then extubated the next morning. Of note, pt was also being treated for UTI with meropenem given history of ESBL.  She was then transferred to regular floor and had 2 episodes of melena, underwent 1UpRBCs then underwent another EGD on 11/05/19 and had 2 clips placed at site of previous clippings.   Of note, during her hospitalization she was followed by urology and was planned to undergo laser lithotripsy and stent exchange on 11/7/19 but was unable to undergo procedure secondary to transfer.   She was transferred to [facility] on 11/7 for further management. On 11/7/19, patient underwent splenic and left gastric arterial embolizations with vascular surgery, Dr. Rock. Today on POD 2 morning labs significant for H&H 7.9/26.2.     A/P:  Neurovascular: No delirium.  - Paraplegia with hx of spinal hematoma: continue neuro checks, c/w baclofen 20mg BID, oxycodone, lidoderm patch for pain.   - Continue duloxetine, gabapentin  - Continue zolpidem for insomnia  - Tylenol PRN for pain.     Cardiovascular: Hemodynamically stable. HR controlled.   - Hx of aortic dissection s/p multiple repairs, EF 60-65% on TTE 8/2019, HTN, HLD.  - C/w labetolol 200mg PO BID, amlodipine 5mg PO qd, and atorvastatin 40mg PO qhs.   - Continue to monitor HR/BP/Tele.     Respiratory: 02 Sat = 98% on RA.  -If on oxygen wean to RA from for O2 Sat > 93%.  -Encourage C+DB and Use of IS 10x / hr while awake.  -CXR in AM    GI: Multiple GIB, s/p multiple endoscopies, most recent EGD on 11/5/19 - 2 clips placed at site of previous clips   - Vascular surgery following. Patient is POD 1  s/p procedure above.   - Continue to appreciate recs. Continue periop abx.   -PPX with pantoprazole IV BID  - Continue to trend H&H. Stable.    Renal / : BUN/Cr: 13/0.48  - Nephrolithiasis. Needs laser lithotripsy and stent replacement outpatient. Needs follow up with outpt Urology.    - Above discussed with Dr. Barriga.  - Neurogenic bladder, self cath q4-6hrs.  -  UA positive on admission. patient has history of recurrent UTIs, clinically no leukocytosis, afebrile.      - meropenem was stopped at OSH per ID.  -Monitor renal function.  -Monitor I/O's.    Endocrine:  no acute issues.   -A1c: 4.9  -TSH: 0.806    Hematologic: H&H stable  -CBC pending  - Consider transfusion if hgb<7    ID: afebrile.   - Complete periop abx.  -Observe for SIRS/Sepsis Syndrome.    Prophylaxis:  -DVT prophylaxis w SQH and SCDs.    Disposition:  - Home when medically stable. 67 y/o Female with h/o aortic dissection s/p multiple repairs, spinal hematoma resulting in paraplegia (on baclofen pump), nephrolithiasis with retained stent (did not f/u to remove as per pt s/p d/c home), recurrent UTIs ESBL positive in the past 2/2 to straight catheterizations, HTN, PAD, and HSV endophthalmitis/keratitis s/p left corneal transplant who presented to [facility] on 10/28/19 with lethargy, weakness, malaise, and endorsing dark stools. Of note, patient has a history of recurrent GIB, and has been hospitalized multiple times with endoscopies, and CTA's which have all been without source of bleeding. On day of admission to OSH, the patient's hemoglobin was 5.2, and s/p 6 uPRBCs. S/p endoscopy on 10/31/19, found to have red blood in the gastric fundus and body but the source of the blood could not be found. 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 The patient was intubated and placed under ICU care for acute hypoxic respiratory failure and then extubated the next morning. Of note, pt was also being treated for UTI with meropenem given history of ESBL.  She was then transferred to regular floor and had 2 episodes of melena, underwent 1UpRBCs then underwent another EGD on 11/05/19 and had 2 clips placed at site of previous clippings.   Of note, during her hospitalization she was followed by urology and was planned to undergo laser lithotripsy and stent exchange on 11/7/19 but was unable to undergo procedure secondary to transfer.   She was transferred to [facility] on 11/7 for further management. On 11/7/19, patient underwent splenic and left gastric arterial embolizations with vascular surgery, Dr. Rock. Today on POD 2 morning labs significant for H&H 7.9/26.2.     A/P:  Neurovascular: No delirium.  - Paraplegia with hx of spinal hematoma: continue neuro checks, c/w baclofen 20mg BID, oxycodone, lidoderm patch for pain.   - Continue duloxetine, gabapentin  - Continue zolpidem for insomnia  - Tylenol PRN for pain.     Cardiovascular: Hemodynamically stable. HR controlled.   - Hx of aortic dissection s/p multiple repairs, EF 60-65% on TTE 8/2019, HTN, HLD.  - C/w labetolol 200mg PO BID, amlodipine 5mg PO qd, and atorvastatin 40mg PO qhs.   - Continue to monitor HR/BP/Tele.     Respiratory: 02 Sat = 95% on RA.  -If on oxygen wean to RA from for O2 Sat > 93%.  -Encourage C+DB and Use of IS 10x / hr while awake.  -CXR in AM    GI: Multiple GIB, s/p multiple endoscopies, most recent EGD on 11/5/19 - 2 clips placed at site of previous clips   - Vascular surgery following. Patient is POD 1  s/p procedure above.   - Continue to appreciate recs. Continue periop abx.   -PPX with pantoprazole IV BID  - Continue to trend H&H. Stable.    Renal / : BUN/Cr: 13/0.48  - Nephrolithiasis. Needs laser lithotripsy and stent replacement outpatient. Needs follow up with outpt Urology.    - Above discussed with Dr. Barriga.  - Neurogenic bladder, self cath q4-6hrs.  -  UA positive on admission. patient has history of recurrent UTIs, clinically no leukocytosis, afebrile.      - meropenem was stopped at OSH per ID.  -Monitor renal function.  -Monitor I/O's.    Endocrine:  no acute issues.   -A1c: 4.9  -TSH: 0.806    Hematologic: H&H stable  -CBC pending  - Consider transfusion if hgb<7    ID: afebrile.   - Complete periop abx.  -Observe for SIRS/Sepsis Syndrome.    Prophylaxis:  -DVT prophylaxis w SQH and SCDs.    Disposition:  - Home when medically stable. 67 y/o Female with h/o aortic dissection s/p multiple repairs, spinal hematoma resulting in paraplegia (on baclofen pump), nephrolithiasis with retained stent (did not f/u to remove as per pt s/p d/c home), recurrent UTIs ESBL positive in the past 2/2 to straight catheterizations, HTN, PAD, and HSV endophthalmitis/keratitis s/p left corneal transplant who presented to [facility] on 10/28/19 with lethargy, weakness, malaise, and endorsing dark stools. Of note, patient has a history of recurrent GIB, and has been hospitalized multiple times with endoscopies, and CTA's which have all been without source of bleeding. On day of admission to OSH, the patient's hemoglobin was 5.2, and s/p 6 uPRBCs. S/p endoscopy on 10/31/19, found to have red blood in the gastric fundus and body but the source of the blood could not be found. 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 The patient was intubated and placed under ICU care for acute hypoxic respiratory failure and then extubated the next morning. Of note, pt was also being treated for UTI with meropenem given history of ESBL.  She was then transferred to regular floor and had 2 episodes of melena, underwent 1UpRBCs then underwent another EGD on 11/05/19 and had 2 clips placed at site of previous clippings.   Of note, during her hospitalization she was followed by urology and was planned to undergo laser lithotripsy and stent exchange on 11/7/19 but was unable to undergo procedure secondary to transfer.   She was transferred to [facility] on 11/7 for further management. On 11/7/19, patient underwent splenic and left gastric arterial embolizations with vascular surgery, Dr. Rock. Today on POD 2 morning labs significant for H&H 7.9/26.2.     A/P:  Neurovascular: No delirium.  - Paraplegia with hx of spinal hematoma: continue neuro checks, c/w baclofen 20mg BID, oxycodone, lidoderm patch for pain.   - Continue duloxetine, gabapentin  - Continue zolpidem for insomnia  - Tylenol PRN for pain.     Cardiovascular: Hemodynamically stable. HR controlled.   - Hx of aortic dissection s/p multiple repairs, EF 60-65% on TTE 8/2019, HTN, HLD.  - C/w labetolol 200mg PO BID, amlodipine 5mg PO qd, and atorvastatin 40mg PO qhs.   - Continue to monitor HR/BP/Tele.     Respiratory: 02 Sat = 95% on RA.  -If on oxygen wean to RA from for O2 Sat > 93%.  -Encourage C+DB and Use of IS 10x / hr while awake.    GI: Multiple GIB, s/p multiple endoscopies, most recent EGD on 11/5/19 - 2 clips placed at site of previous clips   - Vascular surgery following. Patient is POD 2 s/p procedure above. Continue to appreciate recs  - H&H this morning 7.9/26.2, repeat 8.9/29.9  - F/u 6PM CBC  - Stool guaiac ordered, f/u results  - PPX with pantoprazole IV BID    Renal / : BUN/Cr: 12/0.46  - Nephrolithiasis. Needs laser lithotripsy and stent replacement outpatient. Needs follow up with outpt Urology.  - Neurogenic bladder, permafit in place, self cath q4-6hrs.  - UA positive on admission. patient has history of recurrent UTIs, clinically no leukocytosis, afebrile.      - meropenem was stopped at OSH per ID.  -Monitor renal function.  -Monitor I/O's.    Endocrine:  no history of DM, thyroid disease   -A1c: 4.9  -TSH: 0.806    Hematologic: H&H 7.9/26.2, repeat 8.9/29.9  - F/u 6PM CBC  - F/u stool guaiac  - Will maintain active T+S  - Consider transfusion if hgb<7    ID: afebrile.   - Complete periop abx.  -Observe for SIRS/Sepsis Syndrome.    Prophylaxis:  -DVT prophylaxis w SQH and SCDs.    Disposition:  - Home when medically stable. 67 y/o Female with h/o aortic dissection s/p multiple repairs, spinal hematoma resulting in paraplegia (on baclofen pump), nephrolithiasis with retained stent (did not f/u to remove as per pt s/p d/c home), recurrent UTIs ESBL positive in the past 2/2 to straight catheterizations, HTN, PAD, and HSV endophthalmitis/keratitis s/p left corneal transplant who presented to [facility] on 10/28/19 with lethargy, weakness, malaise, and endorsing dark stools. Of note, patient has a history of recurrent GIB, and has been hospitalized multiple times with endoscopies, and CTA's which have all been without source of bleeding. On day of admission to OSH, the patient's hemoglobin was 5.2, and s/p 6 uPRBCs. S/p endoscopy on 10/31/19, found to have red blood in the gastric fundus and body but the source of the blood could not be found. 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 The patient was intubated and placed under ICU care for acute hypoxic respiratory failure and then extubated the next morning. Of note, pt was also being treated for UTI with meropenem given history of ESBL.  She was then transferred to regular floor and had 2 episodes of melena, underwent 1UpRBCs then underwent another EGD on 11/05/19 and had 2 clips placed at site of previous clippings.   Of note, during her hospitalization she was followed by urology and was planned to undergo laser lithotripsy and stent exchange on 11/7/19 but was unable to undergo procedure secondary to transfer.   She was transferred to [facility] on 11/7 for further management. On 11/7/19, patient underwent splenic and left gastric arterial embolizations with vascular surgery, Dr. Rock. Today on POD 2 morning labs significant for H&H 7.9/26.2.     A/P:  Neurovascular: No delirium.  - Paraplegia with hx of spinal hematoma: continue neuro checks, c/w baclofen 20mg BID, oxycodone, lidoderm patch for pain.   - Continue duloxetine, gabapentin  - Continue zolpidem for insomnia  - Tylenol PRN for pain.     Cardiovascular: Hemodynamically stable. HR controlled.   - Hx of aortic dissection s/p multiple repairs, EF 60-65% on TTE 8/2019, HTN, HLD.  - C/w labetolol 200mg PO BID, amlodipine 5mg PO qd, and atorvastatin 40mg PO qhs.   - Continue to monitor HR/BP/Tele.     Respiratory: 02 Sat = 95% on RA.  -If on oxygen wean to RA from for O2 Sat > 93%.  -Encourage C+DB and Use of IS 10x / hr while awake.    GI: Multiple GIB, s/p multiple endoscopies, most recent EGD on 11/5/19 - 2 clips placed at site of previous clips   - Vascular surgery following. Patient is POD 2 s/p procedure above. Continue to appreciate recs  - H&H this morning 7.9/26.2, repeat 8.9/29.9  - F/u 6PM CBC  - Stool guaiac ordered, f/u results  - PPX with pantoprazole IV BID    Renal / : BUN/Cr: 12/0.46  - Nephrolithiasis. Needs laser lithotripsy and stent replacement outpatient. Needs follow up with outpt Urology.  - Neurogenic bladder, permafit in place, self cath q4-6hrs.  - UA positive on admission. patient has history of recurrent UTIs, clinically no leukocytosis, afebrile.      - meropenem was stopped at OSH per ID.  -Monitor renal function.  -Monitor I/O's.    Endocrine:  no history of DM, thyroid disease   -A1c: 4.9  -TSH: 0.806    Hematologic: H&H 7.9/26.2, repeat 8.9/29.9  - F/u 6PM CBC  - F/u stool guaiac  - Will maintain active T+S  - Consider transfusion if hgb<7    ID: afebrile. WBC 6.4  - S/p periop abx.  -Observe for SIRS/Sepsis Syndrome.    Disposition:  - Home when medically stable.

## 2019-11-09 NOTE — PROGRESS NOTE ADULT - SUBJECTIVE AND OBJECTIVE BOX
Patient discussed on morning rounds with Dr. Rizvi    Operation / Date: 11/7/19 arterial embolization with Dr. Rock    SUBJECTIVE ASSESSMENT:  68y Female seen and examined at the bedside. She reports feeling well today. Her drop in blood count was discussed with her in the morning and the need to determine if she is still bleeding was explain. She states she is disappointed she will not be able to leave today.  She denies headache, dizziness, CP, SOB, abd pain, n/v/d      Vital Signs Last 24 Hrs  T(C): 37.1 (09 Nov 2019 05:12), Max: 37.6 (08 Nov 2019 14:06)  T(F): 98.7 (09 Nov 2019 05:12), Max: 99.6 (08 Nov 2019 14:06)  HR: 68 (09 Nov 2019 09:30) (64 - 82)  BP: 96/48 (09 Nov 2019 09:30) (96/48 - 130/62)  BP(mean): 92 (09 Nov 2019 06:30) (79 - 94)  RR: 18 (09 Nov 2019 09:30) (16 - 18)  SpO2: 95% (09 Nov 2019 09:30) (93% - 95%)  I&O's Detail    08 Nov 2019 07:01  -  09 Nov 2019 07:00  --------------------------------------------------------  IN:  Total IN: 0 mL    OUT:    Intermittent Catheterization - Urethral: 1250 mL    Voided: 200 mL  Total OUT: 1450 mL    Total NET: -1450 mL      09 Nov 2019 07:01  -  09 Nov 2019 12:33  --------------------------------------------------------  IN:    Oral Fluid: 240 mL  Total IN: 240 mL    OUT:    Intermittent Catheterization - Urethral: 500 mL  Total OUT: 500 mL    Total NET: -260 mL          CHEST TUBE: no  PETTY DRAIN:  No.  EPICARDIAL WIRES: No.  TIE DOWNS: No.  CLARKE: Permafit    PHYSICAL EXAM:    General: Lying comfortably in bed, NAD    Neurological: A&O x3, face symmetric, UE strength 5/5 b/l, able to move b/l lower extremities, sensation in tact.     Cardiovascular: RRR, normal s1 s2, no M/R/G    Respiratory: Non-labored breathing, chest expansion symmetric, CTA b/l, no W/R/R    Gastrointestinal: +BS x4 quadrant, soft, non-tender to palpation, baclofen pump noted    Extremities: WWP, no pitting edema, no calf tenderness    Vascular: 2+radial pulses b/l, 2+DP pulses b/l    LABS:                        8.9    5.88  )-----------( 212      ( 09 Nov 2019 10:16 )             29.9       COUMADIN:  no        11-09    142  |  107  |  12  ----------------------------<  103<H>  4.1   |  27  |  0.46<L>    Ca    8.3<L>      09 Nov 2019 06:30  Phos  3.0     11-07  Mg     1.8     11-09    TPro  5.0<L>  /  Alb  3.0<L>  /  TBili  0.3  /  DBili  x   /  AST  12  /  ALT  8<L>  /  AlkPhos  72  11-08          MEDICATIONS  (STANDING):  amLODIPine   Tablet 5 milliGRAM(s) Oral daily  atorvastatin 40 milliGRAM(s) Oral at bedtime  baclofen 20 milliGRAM(s) Oral two times a day  DULoxetine 20 milliGRAM(s) Oral two times a day  gabapentin 600 milliGRAM(s) Oral three times a day  heparin  Injectable 5000 Unit(s) SubCutaneous every 12 hours  labetalol 200 milliGRAM(s) Oral two times a day  pantoprazole  Injectable 40 milliGRAM(s) IV Push two times a day  prednisoLONE acetate 1% Suspension 1 Drop(s) Both EYES four times a day  sodium chloride 0.9% lock flush 3 milliLiter(s) IV Push every 8 hours  sodium chloride 2% Ophthalmic Solution 1 Drop(s) Both EYES daily  valACYclovir 1000 milliGRAM(s) Oral three times a day    MEDICATIONS  (PRN):  lidocaine   Patch 1 Patch Transdermal daily PRN pain  ondansetron Injectable 4 milliGRAM(s) IV Push every 4 hours PRN Nausea and/or Vomiting  oxyCODONE    IR 10 milliGRAM(s) Oral three times a day PRN Moderate Pain (4 - 6)  zolpidem 5 milliGRAM(s) Oral at bedtime PRN Insomnia        RADIOLOGY & ADDITIONAL TESTS:  < from: Xray Chest 1 View- PORTABLE-Routine (11.06.19 @ 23:37) >  Findings: Basilar atelectases in the left lower lung zone with elevated   hemidiaphragm. No pleural effusion or pneumothorax.  Cardiomediastinal   silhouettes are within normal limits. Multiple surgical clips projecting   over the mediastinum, likely related to CABG.Multiple fracture   deformities in the left ribs. Status post median sternotomy with intact   sternal wires.    Impression: No pneumonia.    < end of copied text >

## 2019-11-09 NOTE — PROGRESS NOTE ADULT - SUBJECTIVE AND OBJECTIVE BOX
Vascular Surgery Progress Note    Subjective:  No acute complaints. Denies abdominal pain, nausea/vomiting, +F/-BM.    Vital Signs Last 24 Hrs  T(C): 37.1 (09 Nov 2019 05:12), Max: 37.6 (08 Nov 2019 14:06)  T(F): 98.7 (09 Nov 2019 05:12), Max: 99.6 (08 Nov 2019 14:06)  HR: 68 (09 Nov 2019 09:30) (64 - 82)  BP: 96/48 (09 Nov 2019 09:30) (96/48 - 130/62)  BP(mean): 92 (09 Nov 2019 06:30) (79 - 94)  RR: 18 (09 Nov 2019 09:30) (16 - 18)  SpO2: 95% (09 Nov 2019 09:30) (93% - 95%)    I&O's Summary    08 Nov 2019 07:01  -  09 Nov 2019 07:00  --------------------------------------------------------  IN: 0 mL / OUT: 1450 mL / NET: -1450 mL    09 Nov 2019 07:01  -  09 Nov 2019 12:15  --------------------------------------------------------  IN: 240 mL / OUT: 500 mL / NET: -260 mL    Physical Exam:  Constitutional: AXOX3  Respiratory: Unlabored  Cardiovascular: S1S2  Gastrointestinal: soft nontender  Extremities: paraplegic; groins soft  Vascular: 2+ DP    LABS:                    8.9    5.88  )-----------( 212      ( 09 Nov 2019 10:16 )             29.9     11-09  142  |  107  |  12  ----------------------------<  103<H>  4.1   |  27  |  0.46<L>    Ca    8.3<L>      09 Nov 2019 06:30  Phos  3.0     11-07  Mg     1.8     11-09    TPro  5.0<L>  /  Alb  3.0<L>  /  TBili  0.3  /  DBili  x   /  AST  12  /  ALT  8<L>  /  AlkPhos  72  11-08    LIVER FUNCTIONS - ( 08 Nov 2019 07:49 )  Alb: 3.0 g/dL / Pro: 5.0 g/dL / ALK PHOS: 72 U/L / ALT: 8 U/L / AST: 12 U/L / GGT: x           CAPILLARY BLOOD GLUCOSE  POCT Blood Glucose.: 119 mg/dL (09 Nov 2019 11:52)

## 2019-11-10 NOTE — DISCHARGE NOTE NURSING/CASE MANAGEMENT/SOCIAL WORK - PATIENT PORTAL LINK FT
You can access the FollowMyHealth Patient Portal offered by [facility] by registering at the following website: [email or web address]. By joining [employer]’s FollowMyHealth portal, you will also be able to view your health information using other applications (apps) compatible with our system.

## 2019-11-10 NOTE — DISCHARGE NOTE PROVIDER - NSDCCPCAREPLAN_GEN_ALL_CORE_FT
PRINCIPAL DISCHARGE DIAGNOSIS  Diagnosis: Gastrointestinal hemorrhage, unspecified gastrointestinal hemorrhage type  Assessment and Plan of Treatment: Gastrointestinal hemorrhage, unspecified gastrointestinal hemorrhage type

## 2019-11-10 NOTE — DISCHARGE NOTE NURSING/CASE MANAGEMENT/SOCIAL WORK - NSDCPEPT PROEDMA_GEN_ALL_CORE
Yes

## 2019-11-10 NOTE — DISCHARGE NOTE NURSING/CASE MANAGEMENT/SOCIAL WORK - NSDCFUADDAPPT_GEN_ALL_CORE_FT
-Please follow up with Dr. Barriga on 11/25/19 at 2:00PM.  The office is located at [facility], 4th floor. Call us with any questions  #[phone].    - Please follow up with your vascular surgeon Dr. Tai Rock on 11/25/19 at 1:30 PM.     - Please follow up with your urologist . The office will call you with an appointment. If you do not receive an appointment in 1-2 days please call the office and make one. This appointment is to follow up for your kidney stones and stent.     -Walk daily as tolerated and use your incentive spirometer every hour.    -No driving or strenuous activity/exercise until  cleared by your surgeon.    -Gently clean your incisions with anti-bacterial soap and water, pat  dry.  You may leave them open to air.    -Call your doctor if you have shortness of breath, chest pain not  relieved by pain medication, dizziness, fever >101.5, or increased  redness or drainage from incisions, dark black stools, blood in stool, dizziness, lightheadness.

## 2019-11-10 NOTE — DISCHARGE NOTE PROVIDER - CARE PROVIDER_API CALL
George Barriga (MD)  Surgery; Thoracic and Cardiac Surgery  [address] [place] [address]  Phone: [phone]  Fax: [phone]  Follow Up Time:     Tai Rock)  Diagnostic Radiology  [address] [place] [address]  Phone: [phone]  Fax: [phone]  Follow Up Time:

## 2019-11-10 NOTE — DISCHARGE NOTE PROVIDER - NSDCMRMEDTOKEN_GEN_ALL_CORE_FT
amLODIPine 5 mg oral tablet: 1 tab(s) orally once a day  atorvastatin 40 mg oral tablet: 1 tab(s) orally once a day (at bedtime)  baclofen 20 mg oral tablet: 1 tab(s) orally 2 times a day  diclofenac 1% topical gel: Apply 4 grams to affected area 4 times daily as needed  DULoxetine 20 mg oral delayed release capsule: 1 cap(s) orally 2 times a day  gabapentin 600 mg oral tablet: 1 tab(s) orally 3 times a day  labetalol 200 mg oral tablet: 1 tab(s) orally 2 times a day  oxyCODONE 10 mg oral tablet: 1 tab(s) orally 3 times a day, As needed, Severe Pain (7 - 10)  prednisoLONE acetate 1% ophthalmic suspension: 1 drop(s) to each affected eye 4 times a day  Protonix 20 mg oral delayed release tablet: 1 tab(s) orally once a day  sodium chloride, hypertonic 5% ophthalmic solution: 1 application to each affected eye once a day  valACYclovir 1 g oral tablet: 1 tab(s) orally 3 times a day  zolpidem 5 mg oral tablet: 1 tab(s) orally once a day (at bedtime), As Needed MDD:5 mgs amLODIPine 5 mg oral tablet: 1 tab(s) orally once a day  atorvastatin 40 mg oral tablet: 1 tab(s) orally once a day (at bedtime)  baclofen 20 mg oral tablet: 1 tab(s) orally 2 times a day   Bactrim 400 mg-80 mg oral tablet: 1 tab(s) orally every 12 hours   diclofenac 1% topical gel: Apply topically to affected area 4 times a day, As Needed -for mild pain MDD:please supply 1 month supply   DULoxetine 20 mg oral delayed release capsule: 1 cap(s) orally 2 times a day  gabapentin 600 mg oral tablet: 1 tab(s) orally 3 times a day  labetalol 200 mg oral tablet: 1 tab(s) orally 2 times a day  oxyCODONE 10 mg oral tablet: 1 tab(s) orally 3 times a day, As needed, Moderate Pain (4 - 6) MDD:1 tab every 8 hours for severe pain  prednisoLONE acetate 1% ophthalmic suspension: 1 drop(s) to each affected eye 4 times a day MDD:1 month supply  Protonix 20 mg oral delayed release tablet: 1 tab(s) orally once a day  sodium chloride, hypertonic 5% ophthalmic solution: 1 application to each affected eye once a day MDD:please give 1 month supply  valACYclovir 1 g oral tablet: 1 tab(s) orally 3 times a day  zolpidem 5 mg oral tablet: 1 tab(s) orally once a day (at bedtime), As Needed MDD:5 mgs amLODIPine 5 mg oral tablet: 1 tab(s) orally once a day  atorvastatin 40 mg oral tablet: 1 tab(s) orally once a day (at bedtime)  baclofen 20 mg oral tablet: 1 tab(s) orally 2 times a day   Bactrim 400 mg-80 mg oral tablet: 1 tab(s) orally every 12 hours   diclofenac 1% topical gel: Apply topically to affected area 4 times a day, As Needed -for mild pain MDD:please supply 1 month supply   DULoxetine 20 mg oral delayed release capsule: 1 cap(s) orally 2 times a day  gabapentin 600 mg oral tablet: 1 tab(s) orally 3 times a day  labetalol 200 mg oral tablet: 1 tab(s) orally 2 times a day  oxyCODONE 10 mg oral tablet: 1 tab(s) orally 3 times a day, As needed, Moderate Pain (4 - 6) MDD:1 tab every 8 hours for severe pain  prednisoLONE acetate 1% ophthalmic suspension: 1 drop(s) to each affected eye 4 times a day MDD:1 month supply  Protonix 20 mg oral delayed release tablet: 1 tab(s) orally once a day  sodium chloride, hypertonic 5% ophthalmic solution: 1 application to each affected eye once a day MDD:please give 1 month supply  valACYclovir 1 g oral tablet: 1 tab(s) orally 3 times a day  zolpidem 5 mg oral tablet: 1 tab(s) orally once a day (at bedtime), As needed, Insomnia

## 2019-11-10 NOTE — DISCHARGE NOTE PROVIDER - CARE PROVIDERS DIRECT ADDRESSES
[email or web address]

## 2019-11-10 NOTE — PROGRESS NOTE ADULT - SUBJECTIVE AND OBJECTIVE BOX
Patient discussed on morning rounds with Dr. Albarran, Dr. Barriga, Dr. Nguyen     Operation / Date: 11/7/19 arterial embolization with Dr. Rock    SUBJECTIVE ASSESSMENT:  68y Female seen and examined at the bedside. She is tearful and states that she want to go home today. She denies CP, SOB, abd pain n/v/d, calf tenderness. She reports having some mild abd discomfort, with no associated symptoms further evaluated by Dr. Nguyen, no further work up needed. She is eager to go home today.         Vital Signs Last 24 Hrs  T(C): 36.8 (10 Nov 2019 05:01), Max: 37.1 (09 Nov 2019 21:54)  T(F): 98.3 (10 Nov 2019 05:01), Max: 98.7 (09 Nov 2019 21:54)  HR: 74 (10 Nov 2019 08:37) (64 - 74)  BP: 111/56 (10 Nov 2019 08:37) (106/56 - 143/67)  BP(mean): 88 (10 Nov 2019 08:37) (80 - 99)  RR: 18 (10 Nov 2019 08:37) (16 - 18)  SpO2: 95% (10 Nov 2019 08:37) (93% - 96%)  I&O's Detail    09 Nov 2019 07:01  -  10 Nov 2019 07:00  --------------------------------------------------------  IN:    Oral Fluid: 780 mL  Total IN: 780 mL    OUT:    Intermittent Catheterization - Urethral: 800 mL    Voided: 1360 mL  Total OUT: 2160 mL    Total NET: -1380 mL      10 Nov 2019 07:01  -  10 Nov 2019 12:13  --------------------------------------------------------  IN:  Total IN: 0 mL    OUT:    Intermittent Catheterization - Urethral: 250 mL  Total OUT: 250 mL    Total NET: -250 mL          CHEST TUBE: no  PETTY DRAIN:  No.  EPICARDIAL WIRES: No.  TIE DOWNS: No.  CLARKE: No.    PHYSICAL EXAM:    General: Lying comfortably in bed, NAD    Neurological: A&O x3,  paraplegia, weakness in b/ lower extremities, sensation in tact through out, bilatler UE strength 5/5 throughout    Cardiovascular: RRR, normal s1 s2, no M/R/G    Respiratory: Non-labored breathing, chest expansion symmetric, CTA b/l, no W/R/R    Gastrointestinal: +BS x4 quadrant, soft, non-tender to palpation, no rebound tenderness, non-distended    Extremities: WWP, no pitting edema, no calf tenderness or erythema    Vascular: 2+radial pulses b/l, 2+DP pulses b/l      LABS:                        8.5    6.44  )-----------( 234      ( 10 Nov 2019 05:23 )             27.6       COUMADIN:  no        11-10    142  |  107  |  10  ----------------------------<  98  4.2   |  27  |  0.46<L>    Ca    8.6      10 Nov 2019 05:23  Mg     1.8     11-10            MEDICATIONS  (STANDING):  amLODIPine   Tablet 5 milliGRAM(s) Oral daily  atorvastatin 40 milliGRAM(s) Oral at bedtime  baclofen 20 milliGRAM(s) Oral two times a day  DULoxetine 20 milliGRAM(s) Oral two times a day  gabapentin 600 milliGRAM(s) Oral three times a day  heparin  Injectable 5000 Unit(s) SubCutaneous every 12 hours  labetalol 200 milliGRAM(s) Oral two times a day  pantoprazole  Injectable 40 milliGRAM(s) IV Push two times a day  prednisoLONE acetate 1% Suspension 1 Drop(s) Both EYES four times a day  sodium chloride 0.9% lock flush 3 milliLiter(s) IV Push every 8 hours  sodium chloride 2% Ophthalmic Solution 1 Drop(s) Both EYES daily  valACYclovir 1000 milliGRAM(s) Oral three times a day    MEDICATIONS  (PRN):  lidocaine   Patch 1 Patch Transdermal daily PRN pain  ondansetron Injectable 4 milliGRAM(s) IV Push every 4 hours PRN Nausea and/or Vomiting  oxyCODONE    IR 10 milliGRAM(s) Oral three times a day PRN Moderate Pain (4 - 6)  zolpidem 5 milliGRAM(s) Oral at bedtime PRN Insomnia        RADIOLOGY & ADDITIONAL TESTS:    CXR: no infiltrates, no effusion, no pnuemothorax

## 2019-11-10 NOTE — DISCHARGE NOTE PROVIDER - HOSPITAL COURSE
69 y/o Female with h/o aortic dissection s/p multiple repairs, spinal hematoma resulting in paraplegia (on baclofen pump), nephrolithiasis with retained stent (did not f/u to remove as per pt s/p d/c home), recurrent UTIs ESBL positive in the past 2/2 to straight catheterizations, HTN, PAD, and HSV endophthalmitis/keratitis s/p left corneal transplant who presented to [facility] on 10/28/19 with lethargy, weakness, malaise, and endorsing dark stools. Of note, patient has a history of recurrent GIB, and has been hospitalized multiple times with endoscopies, and CTA's which have all been without source of bleeding. On day of admission to OSH, the patient's hemoglobin was 5.2, and s/p 6 uPRBCs. S/p endoscopy on 10/31/19, found to have red blood in the gastric fundus and body but the source of the blood could not be found. 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 The patient was intubated and placed under ICU care for acute hypoxic respiratory failure and then extubated the next morning. Of note, pt was also being treated for UTI with meropenem given history of ESBL.  She was then transferred to regular floor and had 2 episodes of melena, underwent 1UpRBCs then underwent another EGD on 11/05/19 and had 2 clips placed at site of previous clippings.     Of note, during her hospitalization she was followed by urology and was planned to undergo laser lithotripsy and stent exchange on 11/7/19 but was unable to undergo procedure secondary to transfer.     She was transferred to [facility] on 11/7 for further management. On 11/7/19, patient underwent splenic and left gastric arterial embolizations with vascular surgery, Dr. Rock. On POD2 morning labs significant for H&H 7.9/26.2. Repeat labs showed stable hemoglobin at 8.7. Guaiac positive. Pt remained hemodynamically stable and H&H continued to remain stable.             35 minutes was spent with the patient reviewing the discharge material including medications, follow up appointments, recovery, concerning symptoms, and how to contact their health care providers if they have questions 67 y/o Female with h/o aortic dissection s/p multiple repairs, spinal hematoma resulting in paraplegia (on baclofen pump), nephrolithiasis with retained stent (did not f/u to remove as per pt s/p d/c home), recurrent UTIs ESBL positive in the past 2/2 to straight catheterizations, HTN, PAD, and HSV endophthalmitis/keratitis s/p left corneal transplant who presented to [facility] on 10/28/19 with lethargy, weakness, malaise, and endorsing dark stools. Of note, patient has a history of recurrent GIB, and has been hospitalized multiple times with endoscopies, and CTA's which have all been without source of bleeding. On day of admission to OSH, the patient's hemoglobin was 5.2, and s/p 6 uPRBCs. S/p endoscopy on 10/31/19, found to have red blood in the gastric fundus and body but the source of the blood could not be found. 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 The patient was intubated and placed under ICU care for acute hypoxic respiratory failure and then extubated the next morning. Of note, pt was also being treated for UTI with meropenem given history of ESBL.  She was then transferred to regular floor and had 2 episodes of melena, underwent 1UpRBCs then underwent another EGD on 11/05/19 and had 2 clips placed at site of previous clippings.         Of note, during her hospitalization she was followed by urology and was planned to undergo laser lithotripsy and stent exchange on 11/7/19 but was unable to undergo procedure secondary to transfer.          She was transferred to [facility] on 11/7 for further management. On 11/7/19, patient underwent splenic and left gastric arterial embolizations with vascular surgery, Dr. Rock. On POD2 morning labs significant for H&H 7.9/26.2. Guaiac positive. She was hemodynamically stable. Repeat labs showed stable hemoglobin at 8.7, H&H remained stable that evening and the following morning. Today on POD 3 she is hemodynamically stable, H&H is stable and she is eager to go home. Arrangements made for transportation and home care. Discussed with Dr. Barriga and Dr. Nguyen, pt medically stable for discharge home today. Per Dr. Nguyen she will be discharged home on a 5 day course of bactrim. As discussed with her , she will follow up with Dr. Dixon as an outpatient for urology follow up.         35 minutes was spent with the patient reviewing the discharge material including medications, follow up appointments, recovery, concerning symptoms, and how to contact their health care providers if they have questions

## 2019-11-10 NOTE — PROGRESS NOTE ADULT - ASSESSMENT
-Please follow up with Dr. Barriga on 11/25/19 at 2:00PM.  The office is located at [facility], [facility], 4th floor. Call us with any questions  #[phone].    - Please follow up with your vascular surgeon Dr. Tai Rock on 11/25/19 at 1:30 PM.     - Please follow up with your urologist . The office will call you with an appointment. If you do not receive an appointment in 1-2 days please call the office and make one. This appointment is to follow up for your kidney stones and stent.     -Walk daily as tolerated and use your incentive spirometer every hour.    -No driving or strenuous activity/exercise until  cleared by your surgeon.    -Gently clean your incisions with anti-bacterial soap and water, pat  dry.  You may leave them open to air.    -Call your doctor if you have shortness of breath, chest pain not  relieved by pain medication, dizziness, fever >101.5, or increased  redness or drainage from incisions, dark black stools, blood in stool, dizziness, lightheadness.

## 2019-11-11 NOTE — ED PROVIDER NOTE - OBJECTIVE STATEMENT
69 y/o female with PMHx of aortic dissection s/p multiple repairs, spinal hematoma resulting in paraplegia (on baclofen pump), nephrolithiasis with retained stent, recurrent UTIs ESBL positive in the past, HTN, PAD, and blindness left eye presents to the ED presents to the ED BIBEMS from home for AMS. EMS reports pt had non palabale BP when they picked up the pt, on ED arrival pt's BP 84/51 and is tachycardic. No fever. On 11/7/19, patient underwent splenic and left gastric arterial embolizations with vascular surgery, Dr. Rock at [facility]. Was d/c yesterday from [facility] on a 5 day course of bactrim by Dr. Nguyen. ANASTASIA. PCP: Dr. Basil Branham. 67 y/o female with PMHx of aortic dissection s/p multiple repairs, spinal hematoma resulting in paraplegia (on baclofen pump), nephrolithiasis with retained stent, recurrent UTIs ESBL positive in the past, HTN, PAD, and blindness left eye presents to the ED BIBEMS from home for AMS. EMS reports pt had non palabale BP when they picked up the pt, on ED arrival pt's BP 84/51 and is tachycardic. No fever. On 11/7/19, patient underwent splenic and left gastric arterial embolizations with vascular surgery, Dr. Rock at [facility]. Was d/c yesterday from [facility] on a 5 day course of bactrim by Dr. Nguyen. ANASTASIA. PCP: Dr. Basil Branham. 67 y/o female with PMHx of aortic dissection s/p multiple repairs, spinal hematoma resulting in paraplegia (on baclofen pump), nephrolithiasis with retained stent, recurrent UTIs ESBL positive in the past, HTN, PAD, and blindness left eye presents to the ED BIBEMS from home for AMS. EMS reports pt had non palpable BP when they picked up the pt, on ED arrival pt's BP 84/51 and is tachycardic. No fever. On 11/7/19, patient underwent splenic and left gastric arterial embolizations with vascular surgery, Dr. Rock at [facility]. Was d/c yesterday from [facility] on a 5 day course of bactrim by Dr. Nguyen. ANASTASIA. PCP: Dr. Basil Branham.

## 2019-11-11 NOTE — ED ADULT NURSE NOTE - OBJECTIVE STATEMENT
Pt BIBEMS for hypotension. Pt with recent discharge yesterday from [facility] after having an embolization. As per patients , embolization was comleted on Thursday Pt BIBEMS for hypotension. Pt with recent discharge yesterday from [facility] after having an embolization. As per patients , embolization was completed on Thursday. As per , patient was doing well Friday, Saturday, and Sunday.  states that MD was ok with patient going home and patient was discharged.  states that patient had 1 episode of low blood pressure at home yesterday for which he spoke with the MD and he said to wait an hour and see if pressure came up, which as per  it did. This am blood pressure was 130's/70's as per , as well as when the visiting nurse came to visit today. It wasn't until after the nurse left that the patient called for her , he went into the room asking what was wrong and when he saw her appearance (pale, diaphoretic) that he took her blood pressure and called for an ambulance after calling MD. Pt with no visible active bleeding.

## 2019-11-11 NOTE — ED PROVIDER NOTE - NS_ ATTENDINGSCRIBEDETAILS _ED_A_ED_FT
I, Jerad Read MD,  performed the initial face to face bedside interview with this patient regarding history of present illness, review of symptoms and relevant past medical, social and family history.  I completed an independent physical examination.    The history, relevant review of systems, past medical and surgical history, medical decision making, and physical examination was documented by the scribe in my presence and I attest to the accuracy of the documentation.

## 2019-11-11 NOTE — ED PROVIDER NOTE - CLINICAL SUMMARY MEDICAL DECISION MAKING FREE TEXT BOX
Labs, imaging, EKG, emergent blood transfusion 2/2 hypotension, tachycardia, and pallor. Likely admission for GI bleed.

## 2019-11-11 NOTE — ED ADULT NURSE NOTE - NSIMPLEMENTINTERV_GEN_ALL_ED
Implemented All Fall with Harm Risk Interventions:  [place] to call system. Call bell, personal items and telephone within reach. Instruct patient to call for assistance. Room bathroom lighting operational. Non-slip footwear when patient is off stretcher. Physically safe environment: no spills, clutter or unnecessary equipment. Stretcher in lowest position, wheels locked, appropriate side rails in place. Provide visual cue, wrist band, yellow gown, etc. Monitor gait and stability. Monitor for mental status changes and reorient to person, place, and time. Review medications for side effects contributing to fall risk. Reinforce activity limits and safety measures with patient and family. Provide visual clues: red socks.

## 2019-11-11 NOTE — ED PROVIDER NOTE - PROGRESS NOTE DETAILS
Elizabeth Goldberger PGY-3: pt now more awake and alert w/ improved color. Systolic 105 w MAPs in 70s. Currently receiving 2nd u PRBC, rpt cbc to be drawn spoke with pt  in ED who states he would like patient transferred to [facility] as she was seen and last had intervention there. Pt is currently stabilized and no longer hypotensive post blood transfusion. Will call transfer center for continuity of care. Bhavin LR for ED attending, Dr. Read: Spoke with CTICU Dr. Agarwal who states he knows the pt well and will call back after speaking with vascular surgeons at [facility]. Bhavin LR for ED attending, Dr. Read: Spoke with [facility] CTICU Dr. Victorino Agarwal who states he knows the pt well and will call back after speaking with vascular surgeons at [facility]. Bhavin LR for ED attending, Dr. Read: Spoke with Dr. Victorino Agarwal who accepts pt for transfer to [facility].

## 2019-11-11 NOTE — ED PROVIDER NOTE - ATTENDING CONTRIBUTION TO CARE
I, Jerad Read MD,  performed the initial face to face bedside interview with this patient regarding history of present illness, review of symptoms and relevant past medical, social and family history.  I completed an independent physical examination.  I was the initial provider who evaluated this patient. I have signed out the follow up of any pending tests (i.e. labs, radiological studies) to the resident.  I have communicated the patient’s plan of care and disposition with the resident.  The history, relevant review of systems, past medical and surgical history, medical decision making, and physical examination was documented by the scribe in my presence and I attest to the accuracy of the documentation.

## 2019-11-11 NOTE — ED ADULT TRIAGE NOTE - CHIEF COMPLAINT QUOTE
Patient brought in by EMS for GI bleed. patient is hypotensive and tachycardic. hx of recent GI bleed.

## 2019-11-12 NOTE — CONSULT NOTE ADULT - ASSESSMENT
69yo F h/o aortic dissection s/p multiple repairs, spinal hematoma resulting in paraplegia, and embolization of splenic and L gastric arteries for GI bleeding, now admitted for GI bleed.    - Currently undergoing urgent endoscopy, will follow-up results  - Remaining recommendations to follow

## 2019-11-12 NOTE — PROGRESS NOTE ADULT - SUBJECTIVE AND OBJECTIVE BOX
BRENT MONSALVE   MRN#: [id number]     The patient is a 68y Female who was seen, evaluated, & examined with the CTICU staff with a multidisciplinary care plan formulated & implemented.  All available clinical, laboratory, radiographic, pharmacologic, and electrocardiographic data were reviewed & analyzed.      The patient was in the CTICU in critical condition secondary to:     persistent cardiopulmonary dysfunction  acute blood loss anemia    For support and evaluation & prevention of further decompensation secondary to persistent cardiopulmonary dysfunction, respiratory status required:     supplemental oxygen with full ventilatory support / mechanical ventilation  continuous pulse oximetry monitoring  following ABGs with A-line monitoring  IV Propofol infusion    Invasive hemodynamic monitoring with     an A-line was required for continuous MAP/BP monitoring     to ensure adequate cardiovascular support and to evaluate for & help prevent decompensation while receiving     intermittent volume expansion  blood transfusions    secondary to     hemodynamically significant anemia    In addition:  S/p GI bleed with unclear source; prior aortic surgeries, ? if aorto-enteric fistula is present  EGD today with old, dark blood in stomach, intubated for airway protection - also with gastric mass noted on endoscopy  CTA also did not reveal any gastric mass or bleeding source and did show aortic disease/dissections/aneurysm  Likely next step would be tagged red cell scan  Will remain intubated/sedated overnight  H/H and hemodynamics stable, on Phenylephrine earlier today but now off for several hours - will trend CBC    The patient required critical care management and I personally provided 80 minutes of non-continuous care to the patient, excluding separate procedures, in addition to  discussing the patient and plan at length with the CTICU staff and helping coordinate care.

## 2019-11-12 NOTE — ED ADULT NURSE REASSESSMENT NOTE - NS ED NURSE REASSESS COMMENT FT1
dr. post aware of HR and BP, patient distressed because "I cannot have water and I want to drink" and is been crying and tearful. mouth care done for comfort. dr. petty aware. ok to transfer as per dr. onvak. transfer to be completed.
unsuccessful attempt at obtaining urine via straight cath. Dr. Read notified and additional liiter of fluid ordered and started. [place] in place and hooked to suction. Comfort and safety maintained.
report received from previous rn. color pale, skin warm and dry, respirations even and unlabored. vitals stable. awaiting bed to be available at [facility]. patient safety maintained. will continue to monitor.

## 2019-11-12 NOTE — H&P ADULT - ASSESSMENT
69 y/o Female with h/o aortic dissection s/p multiple repairs, spinal hematoma resulting in paraplegia (on baclofen pump), nephrolithiasis with retained stent (did not f/u to remove as per pt s/p d/c home), recurrent UTIs ESBL positive in the past 2/2 to straight catheterizations, HTN, PAD, and HSV endophthalmitis/keratitis s/p left corneal transplant who underwent  splenic and left gastric arterial embolizations with vascular surgery, Dr. Rock last week now with altered mental status , anemia, tachycardia, shock.

## 2019-11-12 NOTE — CONSULT NOTE ADULT - ASSESSMENT
67yo F h/o aortic dissection s/p multiple repairs, spinal hematoma resulting in paraplegia, and embolization of splenic and L gastric arteries for GI bleeding, now admitted for GI bleed.  Intubated for airway protection   Currently on phenylephrine   EGD with GI showing large amount of old blood in stomach but no active bleeding, presence of submucosal mass non bleeding.     Plan:   - Continue management per CTS, GI and vascular   - Will get CTA today   - Surgery team 2c will follow   - Patient seen with chief resident and Dr Woods

## 2019-11-12 NOTE — PROGRESS NOTE ADULT - SUBJECTIVE AND OBJECTIVE BOX
INTERVAL HPI/OVERNIGHT EVENTS:    r/o aortoenteric fistula    67yo Female with Hx aortic dissection - multiple repairs, GIB - Known Dieulafoy lesion of stomach/duodenum - recent splenic and left gastric arterial embolizations (11/7), spinal hematoma - paraplegia. baclofen pump in place RLQ Abd, nephrolithiasis w/retained stent (did not followup for removal and instructed) - recurrent UTIs - known Hx ESBL organisms - required straight cath, HTN, PAD, and HSV endophthalmitis/keratitis, left corneal transplant presented 10/28 to [facility] w/lethargy, weakness, malaise, and melena    Hx recurrent GIB. multiple endoscopic procedures/CTa - no clear source identified    Hg 5.2 - given 6U pRBCs  Endoscopy 10/31: red blood in the gastric fundus   IR - emergent arteriogram, but unable to cross the celiac origin occlusion or access the celiac branch vessels and no embolization was performed.   Intubated for procedure (acute hypoxic respiratory failure) Extubated the next morning.    given Meropenem for UTI given Hx ESBL  ongoing melena - another 11/5 - 2 clips placed at site of previous clippings.     planned laser lithotripsy & stent exchange 11/7 deferred secondary to transfer to [facility]    11/7 - splenic and left gastric arterial embolizations - vascular surgery (Dr. Rock)  d/c home 11/10    returned to [facility] ER via EMS with altered mental status/hypotension (80/40), tachycardic 140.     Hg 7, LA 8 - given 2 U pRBC and transferred to [facility] overnight      BP reportedly stable - staff reporting black bowel movement this am (6a)  patient BP labile - requiring albumin x 2/Donnie IVP - urgent line placed and Donnie 	started  stat labs sent    Patient remains awake/interactive - oriented x 2-3  reportedly given Haldol overnight for agitation/crying    PMHx includes but is not limited to:   GIB - Known Dieulafoy lesion of stomach or duodenum  Subdural hematoma, nontraumatic: spontaneous  Dorsalgia of lumbar region: on pain medication /baclofen po and pump  Self-catheterizes urinary bladder; recurrent UTI  Anemia: chronic  Uveitis  Osteoporosis  PAD (peripheral artery disease)  Hematoma: spinal treated September 2018  Paraplegia: due to spinal hematoma, on wheelchair goes to physical therapy 2 x weekly  Aortic dissection, thoracic: Type A Repaired 2009  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 2018  TIA (transient ischemic attack)  HTN (Hypertension)  History of corneal transplant: left corneal transplant on 5/21/2018  Disorder of spine: unthetethering 2 x  Presence of IVC filter: 2014 ?  S/P aortic dissection repair: Type A Dissection repair /2009   descending aortic aneurysm repair 9/2016  H/O Spinal surgery: laminectomies 2014    ICU Vital Signs Last 24 Hrs  T(C): 38.3 (12 Nov 2019 05:51), Max: 38.3 (12 Nov 2019 05:51)  T(F): 100.9 (12 Nov 2019 05:51), Max: 100.9 (12 Nov 2019 05:51)  HR: 112 (12 Nov 2019 07:00) (98 - 147) sinus  BP: 148/61 (12 Nov 2019 06:00) (69/43 - 159/89)  BP(mean): 85 (12 Nov 2019 06:00) (77 - 89)  ABP: 106/54 (12 Nov 2019 07:00) (106/54 - 118/64)  ABP(mean): 76 (12 Nov 2019 07:00) (76 - 90)  SpO2: 96% (12 Nov 2019 07:00) (91% - 100%) RA    Qtts: Donnie 0.5 started    I&O's Summary    11 Nov 2019 07:01  -  12 Nov 2019 07:00  --------------------------------------------------------  IN: 1200 mL / OUT: 400 mL / NET: 800 mL    Physical Exam    Heart - regular (-)rub/gallop  Lungs - CTA anterior/posterior (-)r/r/w  Abd - (+)BS - soft NT, slightly distended - unchanged since presentation per staff. baclofen pump RLQ - nontender - no erythema  Ext - warm to touch; no cyanosis/clubbing  Chest - prior sternotomy incision - well approximated/healed  Neuro - alert/oriented x 2. tearful at times. non-focal   Skin - no rash     LABS:                        9.7    20.85 )-----------( 90       ( 12 Nov 2019 03:12 )             30.2     11-12    140  |  107  |  43<H>  ----------------------------<  111<H>  4.4   |  19<L>  |  0.44<L>    Ca    7.8<L>      12 Nov 2019 03:12  Phos  2.7     11-12  Mg     1.7     11-12    TPro  5.4<L>  /  Alb  2.9<L>  /  TBili  0.6  /  DBili  x   /  AST  94<H>  /  ALT  15  /  AlkPhos  88  11-12    PT/INR - ( 12 Nov 2019 03:12 )   PT: 13.0 sec;   INR: 1.15     PTT - ( 12 Nov 2019 03:12 )  PTT:33.7 sec  UA 11/12 - (+)Pyuria (WBC >50 ), but contaminated - Sq Epi: Moderate / Bacteria: TNTC    RADIOLOGY & ADDITIONAL STUDIES: reviewed    Patient with recurrent symptomatic anemia and melena - no source identified to date despite multiple interventions and endoscopic assessments presenting with hypovolvemic shock - given 2 U pRBC prior to transfer with improvement - again unstable now requiring volume resuscitation and pressor support     1. CV  developed instability this am - urgent line placement  volume resuscitation - stat labs and Donnie to bridge and maintain MAP 65  LA elevated at presentation to outside hospital - improved trend - now 1.2  sinus    2.     I have spent/provided stated minutes of critical care time to this patient: 90 min INTERVAL HPI/OVERNIGHT EVENTS:    recurrent symptomatic anemia/melena    69yo Female with Hx aortic dissection - multiple repairs, GIB - Known Dieulafoy lesion of stomach/duodenum - recent splenic and left gastric arterial embolizations (11/7), spinal hematoma - paraplegia. baclofen pump in place RLQ Abd, nephrolithiasis w/retained stent (did not followup for removal and instructed) - recurrent UTIs - known Hx ESBL organisms - required straight cath, HTN, PAD, and HSV endophthalmitis/keratitis, left corneal transplant presented 10/28 to [facility] w/lethargy, weakness, malaise, and melena    Hx recurrent GIB. multiple endoscopic procedures/CTa - no clear source identified    Hg 5.2 - given 6U pRBCs    CT a 10/22: No evidence of active GI bleed.    CT A/P w/IV contrast 10/28: Infrarenal abdominal aortic dissection - not significantly changed.   Mild left hydronephrosis. Left renal collecting system stent. 6 x 9 mm, calculus noted within the left distal ureter, without significant change. Tiny left renal calculus. Large amount of stool in the colon with rectal wall thickening and adjacent perirectal infiltrative changes compatible with rectal impaction, clinically correlate to exclude stercoral proctitis.    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   Intubated for procedure (acute hypoxic respiratory failure) Extubated the next morning.    given Meropenem for UTI given Hx ESBL  ongoing melena - another 11/5 - 2 clips placed at site of previous clippings.     planned laser lithotripsy & stent exchange 11/7 deferred secondary to transfer to [facility]    11/7 - splenic and left gastric arterial embolizations - vascular surgery (Dr. Rock)  d/c home 11/10    returned to [facility] ER via EMS with altered mental status/hypotension (80/40), tachycardic 140.     Hg 7, LA 8 - given 2 U pRBC and transferred to [facility] overnight    BP reportedly stable - staff reporting black bowel movement this am (6a)  patient BP labile - requiring albumin x 2/Donnie IVP - urgent line placed and Donnie 	started  stat labs sent    Patient remains awake/interactive - oriented x 2-3  reportedly given Haldol overnight for agitation/crying    PMHx includes but is not limited to:   GIB - Known Dieulafoy lesion of stomach or duodenum  Subdural hematoma, nontraumatic: spontaneous  Dorsalgia of lumbar region: on pain medication /baclofen po and pump  Self-catheterizes urinary bladder; recurrent UTI  Anemia: chronic  Uveitis  Osteoporosis  PAD (peripheral artery disease)  Hematoma: spinal treated September 2018  Paraplegia: due to spinal hematoma, on wheelchair goes to physical therapy 2 x weekly  Aortic dissection, thoracic: Type A Repaired 2009  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 2018  TIA (transient ischemic attack)  HTN (Hypertension)  History of corneal transplant: left corneal transplant on 5/21/2018  Disorder of spine: unthetethering 2 x  Presence of IVC filter: 2014 ?  S/P aortic dissection repair: Type A Dissection repair /2009   descending aortic aneurysm repair 9/2016  H/O Spinal surgery: laminectomies 2014    ICU Vital Signs Last 24 Hrs  T(C): 38.3 (12 Nov 2019 05:51), Max: 38.3 (12 Nov 2019 05:51)  T(F): 100.9 (12 Nov 2019 05:51), Max: 100.9 (12 Nov 2019 05:51)  HR: 112 (12 Nov 2019 07:00) (98 - 147) sinus  BP: 148/61 (12 Nov 2019 06:00) (69/43 - 159/89)  BP(mean): 85 (12 Nov 2019 06:00) (77 - 89)  ABP: 106/54 (12 Nov 2019 07:00) (106/54 - 118/64)  ABP(mean): 76 (12 Nov 2019 07:00) (76 - 90)  SpO2: 96% (12 Nov 2019 07:00) (91% - 100%) RA    Qtts: Donnie 0.5 started    I&O's Summary    11 Nov 2019 07:01  -  12 Nov 2019 07:00  --------------------------------------------------------  IN: 1200 mL / OUT: 400 mL / NET: 800 mL    Physical Exam    Heart - regular (-)rub/gallop  Lungs - CTA anterior/posterior (-)r/r/w  Abd - (+)BS - soft NT, slightly distended - unchanged since presentation per staff. baclofen pump RLQ - nontender - no erythema  Ext - warm to touch; no cyanosis/clubbing  Chest - prior sternotomy incision - well approximated/healed  Neuro - alert/oriented x 2. tearful at times. non-focal   Skin - no rash     LABS:                        9.7    20.85 )-----------( 90       ( 12 Nov 2019 03:12 )             30.2     11-12    140  |  107  |  43<H>  ----------------------------<  111<H>  4.4   |  19<L>  |  0.44<L>    Ca    7.8<L>      12 Nov 2019 03:12  Phos  2.7     11-12  Mg     1.7     11-12    TPro  5.4<L>  /  Alb  2.9<L>  /  TBili  0.6  /  DBili  x   /  AST  94<H>  /  ALT  15  /  AlkPhos  88  11-12    PT/INR - ( 12 Nov 2019 03:12 )   PT: 13.0 sec;   INR: 1.15     PTT - ( 12 Nov 2019 03:12 )  PTT:33.7 sec  UA 11/12 - (+)Pyuria (WBC >50 ), but contaminated - Sq Epi: Moderate / Bacteria: TNTC    RADIOLOGY & ADDITIONAL STUDIES: reviewed    CTa Chest 10/17: No pulmonary embolism. s/p ascending aortic repair with stable residual dissection at the arch and extending into the right brachiocephalic and common carotid arteries. Unchanged penetrating ulcer versus pseudoaneurysm in the mid descending aorta. Unchanged bilateral small airways disease.    Patient with recurrent symptomatic anemia and melena - no source identified to date despite multiple interventions and endoscopic assessments presenting with hypovolemic shock - given 2 U pRBC prior to transfer with improvement - again unstable now requiring volume resuscitation and pressor support     1. CV  developed instability this am - urgent line placement  volume resuscitation - stat labs and Donnie to bridge and maintain MAP 65  LA elevated at presentation to outside hospital - improved trend - now 1.2  sinus    2. GI  complete NPO  despite multiple endoscopic procedures and recent IR embolization persistent melena and symptomatic anemia  stat repeat labs at time of instability this am - now Hg/Hct 6.1/18.8 (prior 9.7/30)  transfuse 2 U pRBC now - ordered and d/w staff   GI called and at bedside; vascular aware - may need to consider surgical consult   may need assessment of small bowel  - ?capsule study/RBC tagged study/repeat angio  Protonix    SCD/GI prophylaxis  d/w patient/staff/GI/Vascular and CTS    I have spent/provided stated minutes of critical care time to this patient: 90 min INTERVAL HPI/OVERNIGHT EVENTS:    recurrent symptomatic anemia/melena    69yo Female with Hx aortic dissection - multiple repairs, GIB - Known Dieulafoy lesion of stomach/duodenum - recent splenic and left gastric arterial embolizations (11/7), spinal hematoma - paraplegia. baclofen pump in place RLQ Abd, nephrolithiasis w/retained stent (did not followup for removal and instructed) - recurrent UTIs - known Hx ESBL organisms - required straight cath, HTN, PAD, and HSV endophthalmitis/keratitis, left corneal transplant presented 10/28 to [facility] w/lethargy, weakness, malaise, and melena    Hx recurrent GIB. multiple endoscopic procedures/CTa - no clear source identified    Hg 5.2 - given 6U pRBCs    CT a 10/22: No evidence of active GI bleed.    CT A/P w/IV contrast 10/28: Infrarenal abdominal aortic dissection - not significantly changed.   Mild left hydronephrosis. Left renal collecting system stent. 6 x 9 mm, calculus noted within the left distal ureter, without significant change. Tiny left renal calculus. Large amount of stool in the colon with rectal wall thickening and adjacent perirectal infiltrative changes compatible with rectal impaction, clinically correlate to exclude stercoral proctitis.    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   Intubated for procedure (acute hypoxic respiratory failure) Extubated the next morning.    given Meropenem for UTI given Hx ESBL  ongoing melena - another 11/5 - 2 clips placed at site of previous clippings.     planned laser lithotripsy & stent exchange 11/7 deferred secondary to transfer to [facility]    11/7 - splenic and left gastric arterial embolizations - vascular surgery (Dr. Rock)  d/c home 11/10    returned to [facility] ER via EMS with altered mental status/hypotension (80/40), tachycardic 140.     Hg 7, LA 8 - given 2 U pRBC and transferred to [facility] overnight    BP reportedly stable - staff reporting black bowel movement this am (6a)  patient BP labile - requiring albumin x 2/Donnie IVP - urgent line placed and Donnie 	started  stat labs sent    Patient remains awake/interactive - oriented x 2-3  reportedly given Haldol overnight for agitation/crying    PMHx includes but is not limited to:   GIB - Known Dieulafoy lesion of stomach or duodenum  Subdural hematoma, nontraumatic: spontaneous  Dorsalgia of lumbar region: on pain medication /baclofen po and pump  Self-catheterizes urinary bladder; recurrent UTI  Anemia: chronic  Uveitis  Osteoporosis  PAD (peripheral artery disease)  Hematoma: spinal treated September 2018  Paraplegia: due to spinal hematoma, on wheelchair goes to physical therapy 2 x weekly  Aortic dissection, thoracic: Type A Repaired 2009  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 2018  TIA (transient ischemic attack)  HTN (Hypertension)  History of corneal transplant: left corneal transplant on 5/21/2018  Disorder of spine: unthetethering 2 x  Presence of IVC filter: 2014 ?  S/P aortic dissection repair: Type A Dissection repair /2009   descending aortic aneurysm repair 9/2016  H/O Spinal surgery: laminectomies 2014    ICU Vital Signs Last 24 Hrs  T(C): 38.3 (12 Nov 2019 05:51), Max: 38.3 (12 Nov 2019 05:51)  T(F): 100.9 (12 Nov 2019 05:51), Max: 100.9 (12 Nov 2019 05:51)  HR: 112 (12 Nov 2019 07:00) (98 - 147) sinus  BP: 148/61 (12 Nov 2019 06:00) (69/43 - 159/89)  BP(mean): 85 (12 Nov 2019 06:00) (77 - 89)  ABP: 106/54 (12 Nov 2019 07:00) (106/54 - 118/64)  ABP(mean): 76 (12 Nov 2019 07:00) (76 - 90)  SpO2: 96% (12 Nov 2019 07:00) (91% - 100%) RA    Qtts: Donnie 0.5 started    I&O's Summary    11 Nov 2019 07:01  -  12 Nov 2019 07:00  --------------------------------------------------------  IN: 1200 mL / OUT: 400 mL / NET: 800 mL    Physical Exam    Heart - regular (-)rub/gallop  Lungs - CTA anterior/posterior (-)r/r/w  Abd - (+)BS - soft NT, slightly distended - unchanged since presentation per staff. baclofen pump RLQ - nontender - no erythema  Ext - warm to touch; no cyanosis/clubbing  Chest - prior sternotomy incision - well approximated/healed  Neuro - alert/oriented x 2. tearful at times. non-focal   Skin - no rash     LABS:                        9.7    20.85 )-----------( 90       ( 12 Nov 2019 03:12 )             30.2     11-12    140  |  107  |  43<H>  ----------------------------<  111<H>  4.4   |  19<L>  |  0.44<L>    Ca    7.8<L>      12 Nov 2019 03:12  Phos  2.7     11-12  Mg     1.7     11-12    TPro  5.4<L>  /  Alb  2.9<L>  /  TBili  0.6  /  DBili  x   /  AST  94<H>  /  ALT  15  /  AlkPhos  88  11-12    PT/INR - ( 12 Nov 2019 03:12 )   PT: 13.0 sec;   INR: 1.15     PTT - ( 12 Nov 2019 03:12 )  PTT:33.7 sec  UA 11/12 - (+)Pyuria (WBC >50 ), but contaminated - Sq Epi: Moderate / Bacteria: TNTC    RADIOLOGY & ADDITIONAL STUDIES: reviewed    CTa Chest 10/17: No pulmonary embolism. s/p ascending aortic repair with stable residual dissection at the arch and extending into the right brachiocephalic and common carotid arteries. Unchanged penetrating ulcer versus pseudoaneurysm in the mid descending aorta. Unchanged bilateral small airways disease.    Patient with recurrent symptomatic anemia and melena - no source identified to date despite multiple interventions and endoscopic assessments presenting with hypovolemic shock - given 2 U pRBC prior to transfer with improvement - again unstable now requiring volume resuscitation and pressor support     1. CV  developed instability this am - urgent line placement  volume resuscitation - stat labs and Donnie to bridge and maintain MAP 65  LA elevated at presentation to outside hospital - improved trend - now 1.2  sinus    2. GI  complete NPO  despite multiple endoscopic procedures and recent IR embolization persistent melena and symptomatic anemia  stat repeat labs at time of instability this am - now Hg/Hct 6.1/18.8 (prior 9.7/30)  transfuse 2 U pRBC now - ordered and d/w staff   GI called and at bedside; vascular aware - may need to consider surgical consult   may need assessment of small bowel  - ?capsule study/RBC tagged study/repeat angio  Protonix    ID -   Low grade temp noted  Hx urinary stent/nephrolithiasis - noted plan to remove/intervene prior, but patient reportedly transferred prior to that intervention  Hx ESBL Klebsiella UTI (recurrent)  cultures sent - on Meropenem presumptively for now  UA contaminated - to repeat   Abx as is for now    SCD/GI prophylaxis  d/w patient/staff/GI/Vascular and CTS    I have spent/provided stated minutes of critical care time to this patient: 90 min

## 2019-11-12 NOTE — CONSULT NOTE ADULT - ASSESSMENT
68F with h/o aortic dissection s/p multiple repairs, spinal hematoma resulting in paraplegia (on baclofen pump), recurrent ESBL UTI, HTN, PAD, and HSV endophthalmitis/keratitis s/p left corneal transplant, multiple GIB w/ multiple endoscopic evaluation and required splenic and left gastric arterial embolization 11/7/19 at [facility] now return to [place] for AMS transferred to [facility] after found to have hypotension and anemia.    #Melena  Patient with acute drop in hgb following melenic stool with hemodynamic instability.  Bedside EGD was notable for old blood in the stomach with mod gastritis and a large gastric body mass and prior hemoclip.  No source of bleeding was identified and no evidence of active bleeding.    -F/u CTA  -Continue PPI BID IV  -Trend CBC  -Transfuse per primary team  -Maintain active T&S and large bore IV    Recommendation d/w primary team  Case d/w svc attending

## 2019-11-12 NOTE — CONSULT NOTE ADULT - SUBJECTIVE AND OBJECTIVE BOX
Attending:      HPI:  69 y/o Female with h/o aortic dissection s/p multiple repairs, spinal hematoma resulting in paraplegia (on baclofen pump), nephrolithiasis with retained stent (did not f/u to remove as per pt s/p d/c home), recurrent UTIs ESBL positive in the past 2/2 to straight catheterizations, HTN, PAD, and HSV endophthalmitis/keratitis s/p left corneal transplant, now presenting with possible GI bleed, AMS, and hemorrhagic shock. Patient was admitted recently after presenting to an OSH with lethargy, weakness, malaise, and dark stools. Multiple endoscopies had been negative. She had required multiple transfusions (6U) at the OSH for a Hgb 5.2. IR attempted angiogram but celiac axis could not be accessed. After another 2 episdoes of melena, she underwent another EGD on 19 and had 2 clips placed where prior clips were. She was then transferred to [facility]. Here, Dr. Rock (IR) performed a splenic and left gastric artery embolization possibly supplying AVMs of the stomach. patient had no more melena, Hgb was stable, and shew as discharged home on 11/10/19.    Patient returns today as a transfer from OSH after her  found her with AMS. EMS found her with BP 80/40, . In the ED Hgb 7 and patient remained hypotensive. She was transfused 2U pRBC and transferred here to [facility]. On admission the patient was hemodynamically stable, Hgb 9.7. This morning, primary team reports a large episode of melena. Hgb 6.1, and pressures began to drop and she was started on pressors. GI was consulted for urgent endoscopy. At time of exam the patient reports baseline pain in LEs, no nausea/vomiting, no CP/SOB.    PAST MEDICAL & SURGICAL HISTORY:  Melena: 10/7/2019  Gastrointestinal hemorrhage: 2019, 2019, 2019  Dieulafoy lesion of stomach or duodenum: 10/1/2019  Subdural hematoma, nontraumatic: spontaneous  Dorsalgia of lumbar region: on pain medication /baclofen po and pump  Self-catheterizes urinary bladder  Anemia: chronic  Uveitis  Osteoporosis  PAD (peripheral artery disease)  Hematoma: spinal treated 2018  Paraplegia: due to spinal hematoma, on wheelchair goes to physical therapy 2 x weekly  Aortic dissection, thoracic: Type A Repaired   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 2018  UTI (urinary tract infection): recurrent  TIA (transient ischemic attack)  HTN (Hypertension)  History of corneal transplant: left corneal transplant on 2018  Disorder of spine: unthetethering 2 x  Presence of IVC filter:  ?  S/P aortic dissection repair: Type A Dissection repair /   descending aortic aneurysm repair 2016  H/O Spinal surgery: laminectomies       MEDICATIONS  (STANDING):  amLODIPine   Tablet 5 milliGRAM(s) Oral daily  atorvastatin 40 milliGRAM(s) Oral at bedtime  baclofen 20 milliGRAM(s) Oral two times a day  DULoxetine 20 milliGRAM(s) Oral daily  gabapentin 600 milliGRAM(s) Oral three times a day  meropenem  IVPB 1000 milliGRAM(s) IV Intermittent every 8 hours  pantoprazole  Injectable 40 milliGRAM(s) IV Push two times a day  prednisoLONE acetate 1% Suspension 1 Drop(s) Both EYES four times a day  sodium chloride 0.9% lock flush 3 milliLiter(s) IV Push every 8 hours  sodium chloride 2% Ophthalmic Solution 1 Drop(s) Both EYES daily  valACYclovir 1000 milliGRAM(s) Oral three times a day    MEDICATIONS  (PRN):  oxyCODONE    IR 10 milliGRAM(s) Oral three times a day PRN Moderate Pain (4 - 6)    Allergies  No Known Allergies    SOCIAL HISTORY:    FAMILY HISTORY:  No pertinent family history in first degree relatives: pt does not recall family history of medical problems in mother or father, both     Vital Signs Last 24 Hrs  T(C): 38.3 (2019 05:51), Max: 38.3 (2019 05:51)  T(F): 100.9 (2019 05:51), Max: 100.9 (2019 05:51)  HR: 88 (2019 09:00) (86 - 147)  BP: 96/42 (2019 09:00) (69/43 - 159/89)  BP(mean): 69 (2019 09:00) (64 - 89)  RR: 17 (2019 09:00) (14 - 29)  SpO2: 97% (2019 09:00) (91% - 100%)    I&O's Summary  2019 07:  -  2019 07:00  --------------------------------------------------------  IN: 1200 mL / OUT: 400 mL / NET: 800 mL    2019 07:  -  2019 10:23  --------------------------------------------------------  IN: 627 mL / OUT: 0 mL / NET: 627 mL    Physical Exam:  Constitutional: AXOX3  Respiratory: Unlabored  Cardiovascular: S1S2  Gastrointestinal: soft, mildly distended, non-tender  Extremities: paraplegic; 2+ DP pulses, WWP    LABS:                      6.1    16.53 )-----------( 79       ( 2019 08:10 )             18.8     12  137  |  109<H>  |  46<H>  ----------------------------<  116<H>  3.6   |  19<L>  |  0.43<L>    Ca    7.5<L>      2019 08:10  Phos  2.7     -12  Mg     1.6     12    TPro  4.0<L>  /  Alb  2.6<L>  /  TBili  0.3  /  DBili  x   /  AST  47<H>  /  ALT  11  /  AlkPhos  60  11-12    PT/INR - ( 2019 03:12 )   PT: 13.0 sec;   INR: 1.15     PTT - ( 2019 03:12 )  PTT:33.7 sec    Urinalysis Basic - ( 2019 00:50 )  Color: Yellow / Appearance: Slightly Turbid / S.010 / pH: x  Gluc: x / Ketone: Negative  / Bili: Negative / Urobili: Negative mg/dL   Blood: x / Protein: 100 mg/dL / Nitrite: Positive   Leuk Esterase: Moderate / RBC: 11-25 /HPF / WBC >50   Sq Epi: x / Non Sq Epi: Moderate / Bacteria: TNTC    LIVER FUNCTIONS - ( 2019 08:10 )  Alb: 2.6 g/dL / Pro: 4.0 g/dL / ALK PHOS: 60 U/L / ALT: 11 U/L / AST: 47 U/L / GGT: x           Assessment: 68y Female    Recommendations:  -   - discussed with Chief on call  - call [phone] with questions

## 2019-11-12 NOTE — CONSULT NOTE ADULT - SUBJECTIVE AND OBJECTIVE BOX
HPI:  68F with h/o aortic dissection s/p multiple repairs, spinal hematoma resulting in paraplegia (on baclofen pump), recurrent ESBL UTI, HTN, PAD, and HSV endophthalmitis/keratitis s/p left corneal transplant, multiple GIB w/ multiple endoscopic evaluation and required splenic and left gastric arterial embolization 19 at [facility] now return to [place] ER for AMS transferred to [facility] after found to have hypotension and anemia.  She was transfused 2u of pRBC and transferred to [facility].  On the morning of 19 6AM, she passed melenic stool with subsequent hypotension requiring pressor support.  GI was consulted for role of endoscopy and concern for recurrent UGIB.      On evaluation, patient was AMS and unable to provide hx of presentation.  AAOx2 but able to follow command.    Allergies    No Known Allergies    Intolerances      Home Medications:  zolpidem 5 mg oral tablet: 1 tab(s) orally once a day (at bedtime), As needed, Insomnia (2019 20:31)    MEDICATIONS:  MEDICATIONS  (STANDING):  amLODIPine   Tablet 5 milliGRAM(s) Oral daily  atorvastatin 40 milliGRAM(s) Oral at bedtime  baclofen 20 milliGRAM(s) Oral two times a day  chlorhexidine 0.12% Liquid 15 milliLiter(s) Oral Mucosa every 12 hours  DULoxetine 20 milliGRAM(s) Oral daily  gabapentin 600 milliGRAM(s) Oral three times a day  meropenem  IVPB 1000 milliGRAM(s) IV Intermittent every 8 hours  pantoprazole  Injectable 40 milliGRAM(s) IV Push two times a day  prednisoLONE acetate 1% Suspension 1 Drop(s) Both EYES four times a day  propofol Infusion 5 MICROgram(s)/kG/Min (1.44 mL/Hr) IV Continuous <Continuous>  sodium chloride 0.9% lock flush 3 milliLiter(s) IV Push every 8 hours  sodium chloride 2% Ophthalmic Solution 1 Drop(s) Both EYES daily  valACYclovir 1000 milliGRAM(s) Oral three times a day    MEDICATIONS  (PRN):  fentaNYL    Injectable 25 MICROGram(s) IV Push every 3 hours PRN Severe Pain (7 - 10)    PAST MEDICAL & SURGICAL HISTORY:  Melena: 10/7/2019  Gastrointestinal hemorrhage: 2019, 2019, 2019  Dieulafoy lesion of stomach or duodenum: 10/1/2019  Subdural hematoma, nontraumatic: spontaneous  Dorsalgia of lumbar region: on pain medication /baclofen po and pump  Self-catheterizes urinary bladder  Anemia: chronic  Uveitis  Osteoporosis  PAD (peripheral artery disease)  Hematoma: spinal treated 2018  Paraplegia: due to spinal hematoma, on wheelchair goes to physical therapy 2 x weekly  Aortic dissection, thoracic: Type A Repaired   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 2018  UTI (urinary tract infection): recurrent  TIA (transient ischemic attack)  HTN (Hypertension)  History of corneal transplant: left corneal transplant on 2018  Disorder of spine: unthetethering 2 x  Presence of IVC filter:  ?  S/P aortic dissection repair: Type A Dissection repair /   descending aortic aneurysm repair 2016  H/O Spinal surgery: laminectomies     FAMILY HISTORY:  No pertinent family history in first degree relatives: pt does not recall family history of medical problems in mother or father, both     SOCIAL HISTORY:  Tobacco: denies  Alcohol:denies  Illicit Drugs:denies    REVIEW OF SYSTEMS:  All other 10 review of systems is negative except as indicated in HPI    Vital Signs Last 24 Hrs  T(C): 36 (2019 18:59), Max: 38.3 (2019 05:51)  T(F): 96.8 (2019 18:59), Max: 100.9 (2019 05:51)  HR: 84 (2019 21:00) (80 - 147)  BP: 121/55 (2019 14:00) (93/42 - 159/89)  BP(mean): 82 (2019 14:00) (64 - 96)  RR: 18 (2019 21:00) (14 - 27)  SpO2: 100% (2019 21:00) (91% - 100%)     @ 07:  -   @ 07:00  --------------------------------------------------------  IN: 1200 mL / OUT: 400 mL / NET: 800 mL     @ 07: @ 21:40  --------------------------------------------------------  IN: 997 mL / OUT: 1600 mL / NET: -603 mL      Mode: AC/ CMV (Assist Control/ Continuous Mandatory Ventilation)  RR (machine): 14  TV (machine): 450  FiO2: 40  PEEP: 5  ITime: 1  MAP: 8  PIP: 15    PHYSICAL EXAM:    General: Elderly female comfortable in bed, in nad  Eyes: moist conjunctivae, sclerae anicteric  HENT: Moist mucous membranes, tongue midline  Neck: Trachea midline, supple  Lungs: Normal respiratory effort and no intercostal retractions  Cardiovascular: tachycardic, S1S2  Abdomen: Soft, non-tender non-distended; Normal bowel sounds; No rebound or guarding  Rectal exam: melenic stool in rectal vault  Extremities: Normal range of motion, No clubbing, cyanosis or edema  Neurological: Alert and oriented x2, nonfocal exam  Skin: Warm and dry.     LABS:                        8.2    15.26 )-----------( 81       ( 2019 21:18 )             24.8     11-12    141  |  112<H>  |  38<H>  ----------------------------<  122<H>  3.7   |  18<L>  |  0.40<L>    Ca    8.0<L>      2019 13:32  Phos  2.6     -12  Mg     1.7     -12    TPro  4.7<L>  /  Alb  2.7<L>  /  TBili  0.8  /  DBili  x   /  AST  48<H>  /  ALT  9<L>  /  AlkPhos  65  11-12        PT/INR - ( 2019 21:18 )   PT: 15.0 sec;   INR: 1.32          PTT - ( 2019 21:18 )  PTT:33.6 sec

## 2019-11-12 NOTE — H&P ADULT - HISTORY OF PRESENT ILLNESS
69 y/o Female with h/o aortic dissection s/p multiple repairs, spinal hematoma resulting in paraplegia (on baclofen pump), nephrolithiasis with retained stent (did not f/u to remove as per pt s/p d/c home), recurrent UTIs ESBL positive in the past 2/2 to straight catheterizations, HTN, PAD, and HSV endophthalmitis/keratitis s/p left corneal transplant who presented to [facility] on 10/28/19 with lethargy, weakness, malaise, and endorsing dark stools. Of note, patient has a history of recurrent GIB, and has been hospitalized multiple times with endoscopies, and CTA's which have all been without source of bleeding. On day of admission to OSH, the patient's hemoglobin was 5.2, and s/p 6 uPRBCs. S/p endoscopy on 10/31/19, found to have red blood in the gastric fundus and body but the source of the blood could not be found. 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 The patient was intubated and placed under ICU care for acute hypoxic respiratory failure and then extubated the next morning. Of note, pt was also being treated for UTI with meropenem given history of ESBL.  She was then transferred to regular floor and had 2 episodes of melena, underwent 1UpRBCs then underwent another EGD on 11/05/19 and had 2 clips placed at site of previous clippings.     Of note, during her hospitalization she was followed by urology and was planned to undergo laser lithotripsy and stent exchange on 11/7/19 but was unable to undergo procedure secondary to transfer.      She was transferred to [facility] on 11/7 for further management. On 11/7/19, patient underwent splenic and left gastric arterial embolizations with vascular surgery, Dr. Rock. On POD2 morning labs significant for H&H 7.9/26.2. Discharged home 11/10/19    Returned to [facility] ED via EMS after  finding her with altered mental status. EMS finds her with BP 80/40 , tachycardic 140. In ED hemoglobin 7, lactic acid 8 BP 80's systolic. Transfused 2 PRBC hemoglobin 8. No evidence of gross UGI bleed. Transferred to [facility] under Dr. Barriga for further evaluation and management.

## 2019-11-12 NOTE — H&P ADULT - NSHPPHYSICALEXAM_GEN_ALL_CORE
Physical Exam  CONSTITUTIONAL:                                                              paraplegic, very anxious  NEURO:                                                                       absent motor both lower extremities                     EYES:                                                                                WNL  ENMT:                                                                               WNL  CV:                                                                                   WNL  RESPIRATORY:                                                                 WNL  GI:                                                                                     WNL  : CLARKE + / -                                                                  WNL  MUSKULOSKELETAL:                                                       WNL  SKIN / BREAST:                                                                  deep tissue injury sacrum  EXTREMITIES:                                                                  WNL

## 2019-11-12 NOTE — CONSULT NOTE ADULT - SUBJECTIVE AND OBJECTIVE BOX
HPI:  69 y/o Female with h/o aortic dissection s/p multiple repairs, spinal hematoma resulting in paraplegia (on baclofen pump), nephrolithiasis with retained stent (did not f/u to remove as per pt s/p d/c home), recurrent UTIs ESBL positive in the past 2/2 to straight catheterizations, HTN, PAD, and HSV endophthalmitis/keratitis s/p left corneal transplant who presented to [facility] on 10/28/19 with lethargy, weakness, malaise, and endorsing dark stools. Of note, patient has a history of recurrent GIB, and has been hospitalized multiple times with endoscopies, and CTA's which have all been without source of bleeding. On day of admission to OSH, the patient's hemoglobin was 5.2, and s/p 6 uPRBCs. S/p endoscopy on 10/31/19, found to have red blood in the gastric fundus and body but the source of the blood could not be found. 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 The patient was intubated and placed under ICU care for acute hypoxic respiratory failure and then extubated the next morning. Of note, pt was also being treated for UTI with meropenem given history of ESBL.  She was then transferred to regular floor and had 2 episodes of melena, underwent 1UpRBCs then underwent another EGD on 19 and had 2 clips placed at site of previous clippings.     Of note, during her hospitalization she was followed by urology and was planned to undergo laser lithotripsy and stent exchange on 19 but was unable to undergo procedure secondary to transfer.      She was transferred to [facility] on  for further management. On 19, patient underwent splenic and left gastric arterial embolizations with vascular surgery, Dr. Rock. On POD2 morning labs significant for H&H 7.9/26.2. Discharged home 11/10/19    Returned to [facility] ED via EMS after  finding her with altered mental status. EMS finds her with BP 80/40 , tachycardic 140. In ED hemoglobin 7, lactic acid 8 BP 80's systolic. Transfused 2 PRBC hemoglobin 8. No evidence of gross UGI bleed. Transferred to [facility] under Dr. Barriga for further evaluation and management. (2019 03:33)      SURGICAL ADDENDUM:  This is a 69 y/o Female with h/o aortic dissection s/p multiple repairs, spinal hematoma resulting in paraplegia (on baclofen pump), nephrolithiasis with retained stent (did not f/u to remove as per pt s/p d/c home), recurrent UTIs ESBL positive in the past 2/2 to straight catheterizations, HTN, PAD, and HSV endophthalmitis/keratitis s/p left corneal transplant who presented to [facility] on 10/28/19 with lethargy, weakness, malaise, and endorsing dark stools. Patient most recently underwent EGD with 2 clips placed and Dr. Rock (IR) performed a splenic and left gastric artery embolization possibly supplying AVMs of the stomach. patient had no more melena, Hgb was stable, and shew as discharged home on 11/10/19. She presents back on 19 again transferred from an OSH for altered mental status and found to have Hb 7 and hypotension. She underwent EGD and found to have large amount of blood in the stomach, but no active bleeding, 2 previous slip with no bleeding, 1 submucosal mass no actively bleeding. Surgery consulted given large amount of blood in the stomach.   Patient was intubated when seen.     PAST MEDICAL & SURGICAL HISTORY:  Melena: 10/7/2019  Gastrointestinal hemorrhage: 2019, 2019, 2019  Dieulafoy lesion of stomach or duodenum: 10/1/2019  Subdural hematoma, nontraumatic: spontaneous  Dorsalgia of lumbar region: on pain medication /baclofen po and pump  Self-catheterizes urinary bladder  Anemia: chronic  Uveitis  Osteoporosis  PAD (peripheral artery disease)  Hematoma: spinal treated 2018  Paraplegia: due to spinal hematoma, on wheelchair goes to physical therapy 2 x weekly  Aortic dissection, thoracic: Type A Repaired   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 2018  UTI (urinary tract infection): recurrent  TIA (transient ischemic attack)  HTN (Hypertension)  History of corneal transplant: left corneal transplant on 2018  Disorder of spine: unthetethering 2 x  Presence of IVC filter:  ?  S/P aortic dissection repair: Type A Dissection repair /   descending aortic aneurysm repair 2016  H/O Spinal surgery: laminectomies 2014      MEDICATIONS  (STANDING):  amLODIPine   Tablet 5 milliGRAM(s) Oral daily  atorvastatin 40 milliGRAM(s) Oral at bedtime  baclofen 20 milliGRAM(s) Oral two times a day  chlorhexidine 0.12% Liquid 15 milliLiter(s) Oral Mucosa every 12 hours  DULoxetine 20 milliGRAM(s) Oral daily  gabapentin 600 milliGRAM(s) Oral three times a day  magnesium sulfate  IVPB 2 Gram(s) IV Intermittent once  meropenem  IVPB 1000 milliGRAM(s) IV Intermittent every 8 hours  pantoprazole  Injectable 40 milliGRAM(s) IV Push two times a day  potassium chloride  20 mEq/100 mL IVPB 20 milliEquivalent(s) IV Intermittent once  prednisoLONE acetate 1% Suspension 1 Drop(s) Both EYES four times a day  sodium chloride 0.9% lock flush 3 milliLiter(s) IV Push every 8 hours  sodium chloride 2% Ophthalmic Solution 1 Drop(s) Both EYES daily  valACYclovir 1000 milliGRAM(s) Oral three times a day    MEDICATIONS  (PRN):  oxyCODONE    IR 10 milliGRAM(s) Oral three times a day PRN Moderate Pain (4 - 6)      Allergies    No Known Allergies    Intolerances        SOCIAL HISTORY: unable to obtain    FAMILY HISTORY:  No pertinent family history in first degree relatives: pt does not recall family history of medical problems in mother or father, both       Vital Signs Last 24 Hrs  T(C): 38.3 (2019 05:51), Max: 38.3 (2019 05:51)  T(F): 100.9 (2019 05:51), Max: 100.9 (2019 05:51)  HR: 88 (2019 15:15) (82 - 147)  BP: 121/55 (2019 14:00) (69/43 - 159/89)  BP(mean): 82 (2019 14:00) (64 - 96)  RR: 16 (2019 15:00) (14 - 29)  SpO2: 100% (2019 15:15) (91% - 100%)    PHYSICAL EXAM  Neuro: sedated   HEENT: normocephalic, no scleral ictera   Pulm: mechanically ventilated, coarse lung sounds    CV: regular rate and rhythm, ejection murmur best heard at aortic area, no carotid bruit   GI: abdomen is soft non tender to palpation, no hepatomegaly   MSK: no swelling, no deformity  Skin: no rash, no wound       LABS:                        8.2    16.63 )-----------( 68       ( 2019 13:32 )             24.7     -    141  |  112<H>  |  38<H>  ----------------------------<  122<H>  3.7   |  18<L>  |  0.40<L>    Ca    8.0<L>      2019 13:32  Phos  2.6       Mg     1.7         TPro  4.7<L>  /  Alb  2.7<L>  /  TBili  0.8  /  DBili  x   /  AST  48<H>  /  ALT  9<L>  /  AlkPhos  65      PT/INR - ( 2019 13:32 )   PT: 15.8 sec;   INR: 1.38          PTT - ( 2019 13:32 )  PTT:33.5 sec  Urinalysis Basic - ( 2019 00:50 )    Color: Yellow / Appearance: Slightly Turbid / S.010 / pH: x  Gluc: x / Ketone: Negative  / Bili: Negative / Urobili: Negative mg/dL   Blood: x / Protein: 100 mg/dL / Nitrite: Positive   Leuk Esterase: Moderate / RBC: 11-25 /HPF / WBC >50   Sq Epi: x / Non Sq Epi: Moderate / Bacteria: TNTC        RADIOLOGY & ADDITIONAL STUDIES:

## 2019-11-12 NOTE — CHART NOTE - NSCHARTNOTEFT_GEN_A_CORE
Infectious Diseases Anti-infective Approval Note    Medication:  meropenem  Dose:  1 gram  Route:  IV  Frequency:  q8hrs  Duration:  3 days    Dose may be adjusted as needed for alterations in renal function.    *THIS IS NOT AN INFECTIOUS DISEASES CONSULTATION*

## 2019-11-13 NOTE — DIETITIAN INITIAL EVALUATION ADULT. - MALNUTRITION
Protein calorie malnutrition suspected. Please see malnutrition chart note for indicators and recommendations.

## 2019-11-13 NOTE — DIETITIAN INITIAL EVALUATION ADULT. - ENERGY NEEDS
Height: 5'7" Weight: 105lbs, IBW 135lbs+/-10%, %IBW 77%, BMI 16.4  ABW used for calculations as pt is underweight and vented

## 2019-11-13 NOTE — DIETITIAN INITIAL EVALUATION ADULT. - ADD RECOMMEND
1. Consider alternative route for nutrition support if pt cannot be extubated w/in next 24-48 hours. 2. SLP eval vs bedside dysphagia screen. 3. For PO diet, recommend low fiber.

## 2019-11-13 NOTE — DIETITIAN INITIAL EVALUATION ADULT. - OTHER INFO
69yo F h/o aortic dissection s/p multiple repairs, spinal hematoma resulting in paraplegia, and embolization of splenic and L gastric arteries for GI bleeding, now admitted for GI bleed. Pt was admitted last week where she required splenic and L gastric arterial embolization (11/7). After d/c pt presented at [place] ER for AMS and was transferred to [facility] after being found w/ hypotension and anemia. Pt w/ acute drop in hgb and HD instability following melenic stool. S/p bedside EGD yesterday showing large amounts of blood in the stomach but no active bleeding. Pt also w/ notable large amounts of stool and rectal distension on CT. Pt was intubated for airway protection. Pt seen in room, intubated on CMV. Sedated with propofol infusing @ 17.38ml/hr (providing 456kcal from lipids/day). Off pressor support at this time-MAP 72. Noted, pt at last admission was 105lbs- at prior admissions she was 121lbs (10/29) and 120lbs (11/4). Height from last admission was 5'7". If admitted weight is accurate, this would indicate a 16lb unintentional wt loss (13% wt change) x2 weeks. Pt noted to have moderate muscle wasting in clavicular region. Moderate protein calorie malnutrition suspected. No family at bedside to obtain diet/weight history. Per team, pt is complete NPO w/ possibility of using NGT pending further discussion w/ GI. No plan for TPN at this time. Skin: manas score 11; GI: dark brown stool, moderate in size 11/13. RD to follow.

## 2019-11-13 NOTE — CHART NOTE - NSCHARTNOTEFT_GEN_A_CORE
Upon Nutritional Assessment by the Registered Dietitian your patient was determined to meet criteria / has evidence of the following diagnosis/diagnoses:          [ ]  Mild Protein Calorie Malnutrition        [x ]  Moderate Protein Calorie Malnutrition        [ ] Severe Protein Calorie Malnutrition        [ ] Unspecified Protein Calorie Malnutrition        [x ] Underweight / BMI <19        [ ] Morbid Obesity / BMI > 40      Findings as based on:  •  Comprehensive nutrition assessment and consultation  •  Calorie counts (nutrient intake analysis)  •  Food acceptance and intake status from observations by staff  •  Follow up  •  Patient education  •  Intervention secondary to interdisciplinary rounds  •   concerns    BMI 16.4  77% of ideal body weight  Noted, pt at last admission was 105lbs- at prior admissions she was 121lbs (10/29) and 120lbs (11/4).   Height from last admission was 5'7". If admitted weight is accurate, this would indicate a 16lb unintentional wt loss (13% wt change) x2 weeks.   Pt noted to have moderate muscle wasting in clavicular region. Moderate protein calorie malnutrition suspected.   No family at bedside to obtain diet/weight history.    Treatment:    The following diet has been recommended:  1. With continued intubation, recommend EN initiation with route per MD as medically feasible.   2. If pt able to be safely extubated, perform dysphagia screen vs formal S&S prior to PO.  3. If unable to utilize the gut w/ continued intubation, consider alternative route for nutrition support.  4. For PO diet, recommend low fiber, DASH.   5. Appreciate updated weights.     PROVIDER Section:     By signing this assessment you are acknowledging and agree with the diagnosis/diagnoses assigned by the Registered Dietitian    Comments:

## 2019-11-13 NOTE — PROGRESS NOTE ADULT - SUBJECTIVE AND OBJECTIVE BOX
Pt intubated and sedated.     24 hr events:    SUBJECTIVE:    MEDICATIONS  (STANDING):  artificial  tears Solution 1 Drop(s) Both EYES two times a day  chlorhexidine 0.12% Liquid 15 milliLiter(s) Oral Mucosa every 12 hours  meropenem  IVPB 1000 milliGRAM(s) IV Intermittent every 8 hours  pantoprazole  Injectable 40 milliGRAM(s) IV Push two times a day  prednisoLONE acetate 1% Suspension 1 Drop(s) Both EYES four times a day  propofol Infusion 5 MICROgram(s)/kG/Min (1.44 mL/Hr) IV Continuous <Continuous>  sodium chloride 0.9% lock flush 3 milliLiter(s) IV Push every 8 hours  sodium chloride 2% Ophthalmic Solution 1 Drop(s) Both EYES daily  valACYclovir 1000 milliGRAM(s) Oral three times a day    MEDICATIONS  (PRN):      Russo:	  [ ] None	[ ] Daily Russo Order Placed	   Indication:	  [ ] Strict I and O's    [ ] Obstruction     [ ] Incontinence + Stage 3 or 4 Decubitus  Central Line:  [ ] None	   [ ]  Medication / TPN Administration     Drips:     ICU Vital Signs Last 24 Hrs  T(C): 37.2 (2019 10:00), Max: 37.3 (2019 01:01)  T(F): 98.9 (2019 10:00), Max: 99.2 (2019 01:01)  HR: 92 (2019 11:00) (80 - 108)  BP: 121/55 (2019 14:00) (121/55 - 136/67)  BP(mean): 82 (2019 14:00) (82 - 96)  ABP: 118/48 (2019 11:00) (110/48 - 160/68)  ABP(mean): 72 (2019 11:00) (72 - 104)  RR: 14 (2019 11:00) (12 - 21)  SpO2: 100% (2019 11:00) (77% - 100%)      Physical Exam:  Neuro: sedated   HEENT: normocephalic, no scleral ictera   Pulm: mechanically ventilated, coarse lung sounds    CV: regular rate and rhythm, ejection murmur best heard at aortic area, no carotid bruit   GI: abdomen is soft non tender to palpation, no hepatomegaly   MSK: no swelling, no deformity  Skin: no rash, no wound       Lines/tubes/drains:    Vent settings:  Mode: AC/ CMV (Assist Control/ Continuous Mandatory Ventilation), RR (machine): 14, TV (machine): 450, FiO2: 40, PEEP: 5, ITime: 1, MAP: 8, PIP: 15    I&O's Detail    2019 07:01  -  2019 07:00  --------------------------------------------------------  IN:    IV PiggyBack: 350 mL    Packed Red Blood Cells: 577 mL    propofol Infusion: 340 mL    Solution: 250 mL  Total IN: 1517 mL    OUT:    Indwelling Catheter - Urethral: 2645 mL  Total OUT: 2645 mL    Total NET: -1128 mL      2019 07:01  -  2019 11:46  --------------------------------------------------------  IN:    propofol Infusion: 69.7 mL  Total IN: 69.7 mL    OUT:    Indwelling Catheter - Urethral: 260 mL  Total OUT: 260 mL    Total NET: -190.3 mL          LABS:                        8.0    14.39 )-----------( 90       ( 2019 03:44 )             24.3     -13    142  |  115<H>  |  23  ----------------------------<  104<H>  4.2   |  19<L>  |  0.41<L>    Ca    8.0<L>      2019 03:44  Phos  3.4     -  Mg     2.2     -    TPro  4.6<L>  /  Alb  2.7<L>  /  TBili  0.4  /  DBili  x   /  AST  43<H>  /  ALT  10  /  AlkPhos  75  11-13    PT/INR - ( 2019 03:44 )   PT: 14.4 sec;   INR: 1.27          PTT - ( 2019 03:44 )  PTT:32.8 sec  Urinalysis Basic - ( 2019 16:02 )    Color: Yellow / Appearance: Clear / S.010 / pH: x  Gluc: x / Ketone: NEGATIVE  / Bili: Negative / Urobili: 0.2 E.U./dL   Blood: x / Protein: NEGATIVE mg/dL / Nitrite: NEGATIVE   Leuk Esterase: Large / RBC: < 5 /HPF / WBC Many /HPF   Sq Epi: x / Non Sq Epi: 0-5 /HPF / Bacteria: Present /HPF      CAPILLARY BLOOD GLUCOSE        LIVER FUNCTIONS - ( 2019 03:44 )  Alb: 2.7 g/dL / Pro: 4.6 g/dL / ALK PHOS: 75 U/L / ALT: 10 U/L / AST: 43 U/L / GGT: x             Cultures:Culture Results:   >=3 organisms. Probable collection contamination. ( @ 00:50)  Culture Results:   No growth to date. ( @ 17:27)      RADIOLOGY & ADDITIONAL STUDIES:  < from: CT Angio Abdomen and Pelvis w/ IV Cont (19 @ 19:00) >  IMPRESSION:  1. Metallic densities in the gastric body recommend correlating to recent   procedure. No obvious  active GI bleeding or aortoenteric fistula.  2. Additional metallic density in the left upper quadrant uncertain these   are vascular coils.  3. 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  4. 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 Aneurysmal dilatation of the renal abdominal aorta up to 3.3 cm   and aneurysmal dilatation of  the infrarenal abdominal aorta up to 3.9 cm. No evidence of aneurysmal   leak or rupture.  5. Dissection of the abdominal aorta extending into the bilateral common   iliac and proximal external  iliac arteries as described above.  6. Other findings as described above.    < end of copied text >

## 2019-11-13 NOTE — PROGRESS NOTE ADULT - SUBJECTIVE AND OBJECTIVE BOX
Pt seen and examined at bedside.  Patient was intubated but shakes head to pain/discomfort.  No further melenic stool overnight.      Allergies    No Known Allergies    Intolerances      MEDICATIONS:  MEDICATIONS  (STANDING):  amLODIPine   Tablet 5 milliGRAM(s) Oral daily  artificial  tears Solution 1 Drop(s) Both EYES two times a day  meropenem  IVPB 1000 milliGRAM(s) IV Intermittent every 8 hours  pantoprazole  Injectable 40 milliGRAM(s) IV Push two times a day  prednisoLONE acetate 1% Suspension 1 Drop(s) Both EYES four times a day  propofol Infusion 5 MICROgram(s)/kG/Min (1.44 mL/Hr) IV Continuous <Continuous>  sodium chloride 0.9% lock flush 3 milliLiter(s) IV Push every 8 hours  sodium chloride 2% Ophthalmic Solution 1 Drop(s) Both EYES daily  valACYclovir 1000 milliGRAM(s) Oral three times a day    MEDICATIONS  (PRN):    Vital Signs Last 24 Hrs  T(C): 37.3 (2019 18:09), Max: 37.3 (2019 01:01)  T(F): 99.2 (2019 18:09), Max: 99.2 (2019 01:01)  HR: 96 (2019 18:00) (80 - 108)  BP: --  BP(mean): --  RR: 18 (2019 18:00) (12 - 27)  SpO2: 100% (2019 18:00) (77% - 100%)     @ 07:  -   @ 07:00  --------------------------------------------------------  IN: 1517 mL / OUT: 2645 mL / NET: -1128 mL     @ 07:  -   @ 18:32  --------------------------------------------------------  IN: 497.5 mL / OUT: 785 mL / NET: -287.5 mL      PHYSICAL EXAM:    General: Elderly female, intubated  HEENT: MMM, conjunctiva and sclera clear  Gastrointestinal: Abdomen: Soft, non-tender non-distended; Normal bowel sounds; No rebound or guarding  Skin: Warm and dry.     LABS:                        7.5    11.93 )-----------( 93       ( 2019 14:31 )             22.5     11-13    143  |  116<H>  |  17  ----------------------------<  97  3.8   |  20<L>  |  0.44<L>    Ca    8.1<L>      2019 14:31  Phos  3.4       Mg     2.2         TPro  4.5<L>  /  Alb  2.5<L>  /  TBili  0.4  /  DBili  x   /  AST  32  /  ALT  9<L>  /  AlkPhos  73  13    PT/INR - ( 2019 03:44 )   PT: 14.4 sec;   INR: 1.27          PTT - ( 2019 03:44 )  PTT:32.8 sec      Urinalysis Basic - ( 2019 16:02 )    Color: Yellow / Appearance: Clear / S.010 / pH: x  Gluc: x / Ketone: NEGATIVE  / Bili: Negative / Urobili: 0.2 E.U./dL   Blood: x / Protein: NEGATIVE mg/dL / Nitrite: NEGATIVE   Leuk Esterase: Large / RBC: < 5 /HPF / WBC Many /HPF   Sq Epi: x / Non Sq Epi: 0-5 /HPF / Bacteria: Present /HPF    Culture - Urine (collected 2019 10:24)  Source: .Urine Catheterized  Preliminary Report (2019 13:22):    >100,000 CFU/ml Enterobacter aerogenes    Susceptibility to follow.    Culture - Urine (collected 2019 00:50)  Source: .Urine None  Final Report (2019 10:35):    >=3 organisms. Probable collection contamination.    Culture - Blood (collected 2019 17:27)  Source: .Blood None  Preliminary Report (2019 01:01):    No growth to date.    CTA reviewed

## 2019-11-13 NOTE — PROGRESS NOTE ADULT - SUBJECTIVE AND OBJECTIVE BOX
INTERVAL HPI/OVERNIGHT EVENTS:    recurrent symptomatic anemia/melena  r/o aortoenteric fistula    67yo Female with Hx aortic dissection - multiple repairs, GIB - Known Dieulafoy lesion of stomach/duodenum - recent splenic and left gastric arterial embolizations (11/7), spinal hematoma - paraplegia. baclofen pump in place RLQ Abd, nephrolithiasis w/retained stent (did not followup for removal and instructed) - recurrent UTIs - known Hx ESBL organisms - required straight cath, HTN, PAD, and HSV endophthalmitis/keratitis, left corneal transplant presented 10/28 to [facility] w/lethargy, weakness, malaise, and melena    Hx recurrent GIB. multiple endoscopic procedures/CTa - no clear source identified    Hg 5.2 - given 6U pRBCs    CT a 10/22: No evidence of active GI bleed.    CT A/P w/IV contrast 10/28: Infrarenal abdominal aortic dissection - not significantly changed.   Mild left hydronephrosis. Left renal collecting system stent. 6 x 9 mm, calculus noted within the left distal ureter, without significant change. Tiny left renal calculus. Large amount of stool in the colon with rectal wall thickening and adjacent perirectal infiltrative changes compatible with rectal impaction, clinically correlate to exclude stercoral proctitis.    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   Intubated for procedure (acute hypoxic respiratory failure) Extubated the next morning.    given Meropenem for UTI given Hx ESBL  ongoing melena - another 11/5 - 2 clips placed at site of previous clippings.     planned laser lithotripsy & stent exchange 11/7 deferred secondary to transfer to [facility]    11/7 - splenic and left gastric arterial embolizations - vascular surgery (Dr. Rock)  d/c home 11/10    returned to [facility] ER via EMS with altered mental status/hypotension (80/40), tachycardic 140.     Hg 7, LA 8 - given 2 U pRBC and transferred to [facility] overnight    BP reportedly stable - (+)melanotic stool 6a 11/12 - later developed hypotension/shock requiring albumin x 2/Donnie IVP - urgent line placed and Donnie started - stat labs sent revealing Hct drop 30 to 18    2 U pRBC given and hemodynamics stabilized - no longer requiring Donnie   urgent GI assessment - general surgery consultation called    endoscopy under conscious sedation - significant amount of dark/black blood noted  procedure stopped - patient electively intubated for airway protection (no hypoxemia/change in resp status)    EGD: old blood in the stomach with mod gastritis and a large gastric body mass and prior hemoclip.  No source of bleeding was identified and no evidence of active bleeding.      CTa Abd/Pelvis performed 11/12: Previous surgery of the ascending and descending thoracic aorta with a dissection in the ascending aorta beginning at the great vessels. The dissection extends into the brachiocephalic artery and extends into the right common carotid artery. Both the true and false lumen opacify. True lumen mod-severely narrowed in the right common carotid artery. Diffuse atherosclerotic plaque of the aorta with a small focal penetrating ulcer in the descending thoracic aorta without a large hematoma in the wall.   Metallic densities in the gastric body recommend correlating to recent   procedure. No obvious active GI bleeding or aortoenteric fistula. Additional metallic density in the left upper quadrant uncertain these are vascular coils. Heterogeneous enhancement of the spleen very suspicious for almost complete infarction of the spleen. Atherosclerotic plaque of the aorta with aneurysmal dilatation of the suprarenal abdominal aorta to 3.8 cm. Aneurysmal dilatation of the renal abdominal aorta up to 3.3 cm and aneurysmal dilatation of the infrarenal abdominal aorta up to 3.9 cm. No evidence of aneurysmal leak or rupture. Dissection of the abdominal aorta extending into the bilateral common iliac and proximal external. Moderate gas and stool in the colon  with a few colonic air fluid levels proximally.     Rectum distended with stool up to 7.4 cm with minimal perirectal fat stranding and sterile colitis not excluded. The small   bowel is nondistended    serial counts stable through day and overnight -   d/w staff - one BM approximately 4a - brown stool - no melena or hematochezia reported     remains on vent - minimal vent requirements (Fi02 40%)  no acute events reported    PMHx includes but is not limited to:   GIB - Known Dieulafoy lesion of stomach or duodenum  Subdural hematoma, nontraumatic: spontaneous  Dorsalgia of lumbar region: on pain medication /baclofen po and pump  Self-catheterizes urinary bladder; recurrent UTI  Anemia: chronic  Uveitis  Osteoporosis  PAD (peripheral artery disease)  Hematoma: spinal treated September 2018  Paraplegia: due to spinal hematoma, on wheelchair goes to physical therapy 2 x weekly  Aortic dissection, thoracic: Type A Repaired 2009  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 2018  TIA (transient ischemic attack)  HTN (Hypertension)  History of corneal transplant: left corneal transplant on 5/21/2018  Disorder of spine: untethering 2 x  Presence of IVC filter: 2014 ?  S/P aortic dissection repair: Type A Dissection repair /2009   descending aortic aneurysm repair 9/2016  H/O Spinal surgery: laminectomies 2014    ICU Vital Signs Last 24 Hrs  T(C): 37.2 (13 Nov 2019 05:01), Max: 37.3 (13 Nov 2019 01:01)  T(F): 98.9 (13 Nov 2019 05:01), Max: 99.2 (13 Nov 2019 01:01)  HR: 108 (13 Nov 2019 08:00) (80 - 108) sinus tach  BP: 121/55 (12 Nov 2019 14:00) (96/42 - 136/67)  BP(mean): 82 (12 Nov 2019 14:00) (69 - 96)  ABP: 114/50 (13 Nov 2019 08:00) (94/54 - 160/68)  ABP(mean): 74 (13 Nov 2019 08:00) (70 - 104)  SpO2: 100% (13 Nov 2019 08:00) (77% - 100%) 40% Fi02    Qtts: Propofol     I&O's Summary    12 Nov 2019 07:01  -  13 Nov 2019 07:00  --------------------------------------------------------  IN: 1517 mL / OUT: 2645 mL / NET: -1128 mL    13 Nov 2019 07:01  -  13 Nov 2019 08:14  --------------------------------------------------------  IN: 17 mL / OUT: 125 mL / NET: -108 mL    Vent settings: AC 14/450/40/5    Physical Exam    Heart - tachy - regular, no rub/gallop  Lungs - BS appreciated bilaterally - no r/r/w  Abd - (+)BS - soft NTND (-)r/r/g - baclofen pump RLQ  Ext - warm to touch; no cyanosis/clubbing/edema  Neuro - alert/oriented ; non-focal   Skin - no rash    LABS:                        8.0    14.39 )-----------( 90       ( 13 Nov 2019 03:44 )             24.3     11-13    142  |  115<H>  |  23  ----------------------------<  104<H>  4.2   |  19<L>  |  0.41<L>    Ca    8.0<L>      13 Nov 2019 03:44  Phos  3.4     11-13  Mg     2.2     11-13    TPro  4.6<L>  /  Alb  2.7<L>  /  TBili  0.4  /  DBili  x   /  AST  43<H>  /  ALT  10  /  AlkPhos  75  11-13    PT/INR - ( 13 Nov 2019 03:44 )   PT: 14.4 sec;   INR: 1.27     PTT - ( 13 Nov 2019 03:44 )  PTT:32.8 sec  UA 11/12: WBC Many    ABG - 7.42/31/184/99    RADIOLOGY & ADDITIONAL STUDIES: reviewed    I have spent/provided stated minutes of critical care time to this patient: 90 min INTERVAL HPI/OVERNIGHT EVENTS:    recurrent symptomatic anemia/melena  r/o aortoenteric fistula    67yo Female with Hx aortic dissection - multiple repairs, GIB - Known Dieulafoy lesion of stomach/duodenum - recent splenic and left gastric arterial embolizations (11/7), spinal hematoma - paraplegia. baclofen pump in place RLQ Abd, nephrolithiasis w/retained stent (did not followup for removal and instructed) - recurrent UTIs - known Hx ESBL organisms - required straight cath, HTN, PAD, and HSV endophthalmitis/keratitis, left corneal transplant presented 10/28 to [facility] w/lethargy, weakness, malaise, and melena    Hx recurrent GIB. multiple endoscopic procedures/CTa - no clear source identified    Hg 5.2 - given 6U pRBCs    CT a 10/22: No evidence of active GI bleed.    CT A/P w/IV contrast 10/28: Infrarenal abdominal aortic dissection - not significantly changed.   Mild left hydronephrosis. Left renal collecting system stent. 6 x 9 mm, calculus noted within the left distal ureter, without significant change. Tiny left renal calculus. Large amount of stool in the colon with rectal wall thickening and adjacent perirectal infiltrative changes compatible with rectal impaction, clinically correlate to exclude stercoral proctitis.    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   Intubated for procedure (acute hypoxic respiratory failure) Extubated the next morning.    given Meropenem for UTI given Hx ESBL  ongoing melena - another 11/5 - 2 clips placed at site of previous clippings.     planned laser lithotripsy & stent exchange 11/7 deferred secondary to transfer to [facility]    11/7 - splenic and left gastric arterial embolizations - vascular surgery (Dr. Rock)  d/c home 11/10    returned to [facility] ER via EMS with altered mental status/hypotension (80/40), tachycardic 140.     Hg 7, LA 8 - given 2 U pRBC and transferred to [facility] overnight    BP reportedly stable - (+)melanotic stool 6a 11/12 - later developed hypotension/shock requiring albumin x 2/Donnie IVP - urgent line placed and Donnie started - stat labs sent revealing Hct drop 30 to 18    2 U pRBC given and hemodynamics stabilized - no longer requiring Donnie   urgent GI assessment - general surgery consultation called    endoscopy under conscious sedation - significant amount of dark/black blood noted  procedure stopped - patient electively intubated for airway protection (no hypoxemia/change in resp status)    EGD: old blood in the stomach with mod gastritis and a large gastric body mass and prior hemoclip.  No source of bleeding was identified and no evidence of active bleeding.      CTa Abd/Pelvis performed 11/12: Previous surgery of the ascending and descending thoracic aorta with a dissection in the ascending aorta beginning at the great vessels. The dissection extends into the brachiocephalic artery and extends into the right common carotid artery. Both the true and false lumen opacify. True lumen mod-severely narrowed in the right common carotid artery. Diffuse atherosclerotic plaque of the aorta with a small focal penetrating ulcer in the descending thoracic aorta without a large hematoma in the wall.   Metallic densities in the gastric body recommend correlating to recent   procedure. No obvious active GI bleeding or aortoenteric fistula. Additional metallic density in the left upper quadrant uncertain these are vascular coils. Heterogeneous enhancement of the spleen very suspicious for almost complete infarction of the spleen. Atherosclerotic plaque of the aorta with aneurysmal dilatation of the suprarenal abdominal aorta to 3.8 cm. Aneurysmal dilatation of the renal abdominal aorta up to 3.3 cm and aneurysmal dilatation of the infrarenal abdominal aorta up to 3.9 cm. No evidence of aneurysmal leak or rupture. Dissection of the abdominal aorta extending into the bilateral common iliac and proximal external. Moderate gas and stool in the colon  with a few colonic air fluid levels proximally.     Rectum distended with stool up to 7.4 cm with minimal perirectal fat stranding and sterile colitis not excluded. The small   bowel is nondistended    serial counts stable through day and overnight -   d/w staff - one BM approximately 4a - brown stool - no melena or hematochezia reported     remains on vent - minimal vent requirements (Fi02 40%)  no acute events reported    PMHx includes but is not limited to:   GIB - Known Dieulafoy lesion of stomach or duodenum  Subdural hematoma, nontraumatic: spontaneous  Dorsalgia of lumbar region: on pain medication /baclofen po and pump  Self-catheterizes urinary bladder; recurrent UTI  Anemia: chronic  Uveitis  Osteoporosis  PAD (peripheral artery disease)  Hematoma: spinal treated September 2018  Paraplegia: due to spinal hematoma, on wheelchair goes to physical therapy 2 x weekly  Aortic dissection, thoracic: Type A Repaired 2009  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 2018  TIA (transient ischemic attack)  HTN (Hypertension)  History of corneal transplant: left corneal transplant on 5/21/2018  Disorder of spine: untethering 2 x  Presence of IVC filter: 2014 ?  S/P aortic dissection repair: Type A Dissection repair /2009   descending aortic aneurysm repair 9/2016  H/O Spinal surgery: laminectomies 2014    ICU Vital Signs Last 24 Hrs  T(C): 37.2 (13 Nov 2019 05:01), Max: 37.3 (13 Nov 2019 01:01)  T(F): 98.9 (13 Nov 2019 05:01), Max: 99.2 (13 Nov 2019 01:01)  HR: 108 (13 Nov 2019 08:00) (80 - 108) sinus tach  BP: 121/55 (12 Nov 2019 14:00) (96/42 - 136/67)  BP(mean): 82 (12 Nov 2019 14:00) (69 - 96)  ABP: 114/50 (13 Nov 2019 08:00) (94/54 - 160/68)  ABP(mean): 74 (13 Nov 2019 08:00) (70 - 104)  SpO2: 100% (13 Nov 2019 08:00) (77% - 100%) 40% Fi02    Qtts: Propofol     I&O's Summary    12 Nov 2019 07:01  -  13 Nov 2019 07:00  --------------------------------------------------------  IN: 1517 mL / OUT: 2645 mL / NET: -1128 mL    13 Nov 2019 07:01  -  13 Nov 2019 08:14  --------------------------------------------------------  IN: 17 mL / OUT: 125 mL / NET: -108 mL    Vent settings: AC 14/450/40/5    Physical Exam    Heart - tachy - regular, no rub/gallop  Lungs - BS appreciated bilaterally - no r/r/w  Abd - (+)BS - soft NTND (-)r/r/g - baclofen pump RLQ  Ext - warm to touch; no cyanosis/clubbing/edema  Neuro - alert/oriented ; non-focal   Skin - no rash    LABS:                        8.0    14.39 )-----------( 90       ( 13 Nov 2019 03:44 )             24.3     11-13    142  |  115<H>  |  23  ----------------------------<  104<H>  4.2   |  19<L>  |  0.41<L>    Ca    8.0<L>      13 Nov 2019 03:44  Phos  3.4     11-13  Mg     2.2     11-13    TPro  4.6<L>  /  Alb  2.7<L>  /  TBili  0.4  /  DBili  x   /  AST  43<H>  /  ALT  10  /  AlkPhos  75  11-13    PT/INR - ( 13 Nov 2019 03:44 )   PT: 14.4 sec;   INR: 1.27     PTT - ( 13 Nov 2019 03:44 )  PTT:32.8 sec  UA 11/12: WBC Many    ABG - 7.42/31/184/99    RADIOLOGY & ADDITIONAL STUDIES: reviewed    CTa Chest 10/17: No pulmonary embolism. s/p ascending aortic repair with stable residual dissection at the arch and extending into the right brachiocephalic and common carotid arteries. Unchanged penetrating ulcer versus pseudoaneurysm in the mid descending aorta. Unchanged bilateral small airways disease.    Patient with recurrent symptomatic anemia and melena - no source identified to date despite multiple interventions and endoscopic assessments presenting with hypovolemic shock - given 2 U pRBC prior to transfer with improvement - (+)melena and instability - since stabilized with additional 2 U pRBC 11/12     1. CV  hemodynamically stable  sinus rhythm; sinus tach    2. GI  complete NPO  no source yet identified - bleeding develops rapidly with decompensation and clinical deterioration and seemingly stops as rapidly  multiple endoscopic procedures and recent IR embolization with persistent melena and symptomatic anemia  no clear source yet identified - ? SB  to d/w GI next steps and to address large amount of stool and rectal distention noted on CT  brown BM this am   serial labs ongoing  plan RBC scan - ordered and to consider capsule study or double balloon enteroscopy  call placed to GI attending - awaiting response  Vascular (Carrocio) and Gen Surg (Peggy) following  avoid narcotics   Protonix    ID -   Hx ESBL Klebsiella UTI - currently on Meropenem  UA with pyuria  Hx urinary stent/nephrolithiasis - noted plan to remove/intervene prior, but patient reportedly transferred prior to that intervention  cultures sent   WBC 14; afebrile  Blood Cx 11/11 (-)  Prior Cx:  UCx 10/30 - contaminated  Blood Cx 10/30 (-) x 2  UCx 10/31 - <10K normal urogenital chintan    SCD/GI prophylaxis    d/w patient/staff/GI/Vascular and CTS; d/w Gen surgery 11/12    I have spent/provided stated minutes of critical care time to this patient: 90 min

## 2019-11-13 NOTE — PROGRESS NOTE ADULT - ASSESSMENT
69yo F h/o aortic dissection s/p multiple repairs, spinal hematoma resulting in paraplegia, and embolization of splenic and L gastric arteries for GI bleeding, now admitted for GI bleed.  Intubated for airway protection    EGD with GI showing large amount of old blood in stomach but no active bleeding, presence of submucosal mass non bleeding.   CTA - no active bleeding    Plan:   - Continue management per CTS, GI and vascular   - Trend CBC  - Surgery team 2c will follow

## 2019-11-13 NOTE — PROGRESS NOTE ADULT - SUBJECTIVE AND OBJECTIVE BOX
24 hr events    Subjective:    Pain:   Nausea: [ ] YES [ ] NO            Vomiting: [ ] YES [ ] NO  Abd Pain: [ ] YES [ ] NO  Diarrhea: [ ] YES [ ] NO         Constipation: [ ] YES [ ] NO     Chest Pain: [ ] YES [ ] NO    SOB:  [ ] YES [ ] NO  Flatus: [ ] YES [ ] NO  BM: [ ] YES [ ] NO  Voiding: [ ] YES [ ] NO  Weakness: [ ] YES [ ] NO  Numbness: [ ] YES [ ] NO  Extremity coldness: [ ] YES [ ] NO  Claudication: [ ] YES [ ] NO  Extremity Swelling: [ ] YES [ ] NO  Ulcers: [ ] YES [ ] NO        Vital Signs Last 24 Hrs  T(C): 37 (2019 14:00), Max: 37.3 (2019 01:01)  T(F): 98.6 (2019 14:00), Max: 99.2 (2019 01:01)  HR: 97 (2019 16:05) (80 - 108)  BP: --  BP(mean): --  RR: 25 (2019 16:00) (12 - 27)  SpO2: 100% (2019 16:05) (77% - 100%)    I&O's Summary    2019 07:01  -  2019 07:00  --------------------------------------------------------  IN: 1517 mL / OUT: 2645 mL / NET: -1128 mL    2019 07:01  -  2019 16:47  --------------------------------------------------------  IN: 147.5 mL / OUT: 585 mL / NET: -437.5 mL        Physical Exam:  Constitutional: AXOX3  Respiratory: Unlabored  Cardiovascular: S1S2  Gastrointestinal: soft, mildly distended, non-tender  Extremities: paraplegic; 2+ DP pulses, WWP    Lines/drains/tubes:    LABS:                        7.5    11.93 )-----------( 93       ( 2019 14:31 )             22.5         143  |  116<H>  |  17  ----------------------------<  97  3.8   |  20<L>  |  0.44<L>    Ca    8.1<L>      2019 14:31  Phos  3.4       Mg     2.2         TPro  4.5<L>  /  Alb  2.5<L>  /  TBili  0.4  /  DBili  x   /  AST  32  /  ALT  9<L>  /  AlkPhos  73      PT/INR - ( 2019 03:44 )   PT: 14.4 sec;   INR: 1.27          PTT - ( 2019 03:44 )  PTT:32.8 sec  Urinalysis Basic - ( 2019 16:02 )    Color: Yellow / Appearance: Clear / S.010 / pH: x  Gluc: x / Ketone: NEGATIVE  / Bili: Negative / Urobili: 0.2 E.U./dL   Blood: x / Protein: NEGATIVE mg/dL / Nitrite: NEGATIVE   Leuk Esterase: Large / RBC: < 5 /HPF / WBC Many /HPF   Sq Epi: x / Non Sq Epi: 0-5 /HPF / Bacteria: Present /HPF      LIVER FUNCTIONS - ( 2019 14:31 )  Alb: 2.5 g/dL / Pro: 4.5 g/dL / ALK PHOS: 73 U/L / ALT: 9 U/L / AST: 32 U/L / GGT: x           CAPILLARY BLOOD GLUCOSE          RADIOLOGY & ADDITIONAL TESTS:  EXAM:  CT ANGIO CHEST (W)AW IC                          EXAM:  CT ANGIO ABD PELV (W)AW IC                          *** ADDENDUM 2019  ***    Graft reconstruction of the ascending and descending thoracic aorta with   persistent dissection flap within the aortic arch and extending into the   RIGHT common carotid artery.      *** END OF ADDENDUM 2019  ***      PROCEDURE DATE:  2019          INTERPRETATION:  CLINICAL INDICATION: 68-year-old for detection of   aortoenteric fistula.History of gastrointestinal bleeding and history of   recent coil embolization. History of aortic dissection and graft repair.        FINDINGS: CT angiography of the chest, abdomen and pelvis was performed   before and after the administration of intravenous contrast. Multiplanar   and maximum intensity projection 3D reconstructions were performed in the   sagittal and coronal planes. Comparison is made to prior studies dated   10/20/2019, 10/22/2019, 10/17/2019, 10/16/2019 and multiple additional   studies dating back to 2010.    Evaluation of the chest demonstrates stable heterogeneity and nodularity   of the thyroid gland with enlargement of the right lobe with a dominant   nodule measuring 14 mm. There is top normal heart size. Persistent small   left pleural effusion. Negative for thoracic inlet, axillary, mediastinal   or hilar lymphadenopathy. There is coronary arterial calcification.   Negative for mitral annular or aortic valvular calcification. Negative   for intraluminal thrombus within the left atrial appendage. Negative for   enlargement of the main pulmonary artery. No corey central or segmental   pulmonary embolus within the limitations of suboptimal opacification of   the pulmonary artery and its branches. Endotracheal tube in appropriate   position. Evaluation of the lung parenchyma demonstrates no interval   change in multiple regions of tree-in-bud branching clustered   micronodular opacity, most confluent within the subpleural right upper   lobe and left upper lobe, consistent with bronchiolitis. There is left   basilar atelectasis. No pulmonary consolidation or mass. Post surgical   changes along the left lower lobe.     Evaluation of the abdomen demonstrates that the liver, bilateral adrenal   glands and right kidney are normal in appearance. There is mild central   biliary ductal dilatation with distention of the gallbladder with fluid.   There is cortical atrophy of the left kidney with a nephroureteral stent   in stable position; associated avidenhancement of the mucosa of the   proximal left ureter and collecting system, consistent with ureteritis.   Small bilateral renal cysts. There is heterogeneous venous and arterial   hypoperfusion of the spleen, consistent with infarct, not expected given   recent coil embolization. Evaluation of the gastrointestinal tract   demonstrates fecal loading of stool within the rectum. There is no bowel   thickening or bowel obstruction. Evaluation of the pelvis demonstrates   the uterus and adnexal regions are unremarkable. Russo catheter balloon   within the bladder which contains a small volume of fluid. There is   severe anasarca. Presacral edema. No significant free fluid. Small fat   and fluid-containing left inguinal hernia. No adenopathy. Evaluation of   the osseous structures demonstrates posterior thoracic decompression and   no frankly destructive osseous lesion.     Evaluation of the vasculature demonstrates graft repair of the ascending   thoracic aorta with persistent aneurysmal dilatation of the aortic root   spanning 5.0 cm, previously spanning 5.0 cm in 10/2019 and measuring 4.2   cm in 2015. There is persistent dissection flap within the aortic arch   extending into the innominate artery and right common carotid artery,   unchanged since 2015. There is graft repair of the ascending thoracic   aorta with stable small saccular pseudoaneurysm/penetrating ulcer of the   left lateral wall of the descending thoracic aorta spanning 8 mm,   unchanged since 10/2019. There ispersistent dissection flap within the   abdominal aorta extending into the bilateral common iliac arteries and   left external iliac artery, unchanged since 2015. 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 Multiple arterial collaterals within the susie hepatis and   in the perisplenic region with reconstitution of the distal splenic and   hepatic arteries, increased since 2016. Superior mesenteric artery and   bilateral renal arteries arise from the false lumen and are adequately   opacified. Multiple embolization coils in the region of the splenic   artery and gastric artery. Extensive arterial collaterals in the   gastrohepatic ligament and perisplenic region. IVC filter in appropriate   position. The opacified aspects of the hepatic portal, splenic, superior   mesenteric vein, bilateral renal veins, IVC and bilateral iliac veins   demonstrates no corey intraluminal thrombus. There is stable fusiform   aneurysmal dilatation of the infrarenal abdominal aorta spanning 3.2 cm.           IMPRESSION:    No CT evidence of aortoenteric fistula within the limitations of   suboptimal opacification of the arterial vasculature.    Graft reconstruction of the ascending and descending thoracic aorta with   persistent dissection flap within the aortic arch and extending into the   left common carotid artery with stable small penetrating ulcer versus   pseudoaneurysm of the descending thoracic aorta. Progressive aneurysmal   dilatation of aortic root.     Persistent dissection flap within the abdominal aorta extending into the   bilateral common iliac and left external iliac arteries. 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    Stable mild multifocal bronchiolitis.      ***Please see the addendum at the top of this report.It may contain   additional important information or changes.**** 24 hr events    Subjective: Patient seen and examined bedside. Was extubated immediately prior to exam.    Pain:   Nausea: [ ] YES [ ] NO            Vomiting: [ ] YES [ ] NO  Abd Pain: [ ] YES [ ] NO  Diarrhea: [ ] YES [ ] NO         Constipation: [ ] YES [ ] NO     Chest Pain: [ ] YES [ ] NO    SOB:  [ ] YES [ ] NO  Flatus: [ ] YES [ ] NO  BM: [ ] YES [ ] NO  Voiding: [ ] YES [ ] NO  Weakness: [ ] YES [ ] NO  Numbness: [ ] YES [ ] NO  Extremity coldness: [ ] YES [ ] NO  Claudication: [ ] YES [ ] NO  Extremity Swelling: [ ] YES [ ] NO  Ulcers: [ ] YES [ ] NO        Vital Signs Last 24 Hrs  T(C): 37 (2019 14:00), Max: 37.3 (2019 01:01)  T(F): 98.6 (2019 14:00), Max: 99.2 (2019 01:01)  HR: 97 (2019 16:05) (80 - 108)  BP: --  BP(mean): --  RR: 25 (2019 16:00) (12 - 27)  SpO2: 100% (2019 16:05) (77% - 100%)    I&O's Summary    2019 07:  -  2019 07:00  --------------------------------------------------------  IN: 1517 mL / OUT: 2645 mL / NET: -1128 mL    2019 07:  -  2019 16:47  --------------------------------------------------------  IN: 147.5 mL / OUT: 585 mL / NET: -437.5 mL        Physical Exam:  Constitutional: AXOX3  Respiratory: Unlabored  Cardiovascular: S1S2  Gastrointestinal: soft, mildly distended, non-tender  Extremities: paraplegic; 2+ DP pulses, WWP    Lines/drains/tubes:    LABS:                        7.5    11. )-----------( 93       ( 2019 14:31 )             22.5         143  |  116<H>  |  17  ----------------------------<  97  3.8   |  20<L>  |  0.44<L>    Ca    8.1<L>      2019 14:31  Phos  3.4       Mg     2.2         TPro  4.5<L>  /  Alb  2.5<L>  /  TBili  0.4  /  DBili  x   /  AST  32  /  ALT  9<L>  /  AlkPhos  73      PT/INR - ( 2019 03:44 )   PT: 14.4 sec;   INR: 1.27          PTT - ( 2019 03:44 )  PTT:32.8 sec  Urinalysis Basic - ( 2019 16:02 )    Color: Yellow / Appearance: Clear / S.010 / pH: x  Gluc: x / Ketone: NEGATIVE  / Bili: Negative / Urobili: 0.2 E.U./dL   Blood: x / Protein: NEGATIVE mg/dL / Nitrite: NEGATIVE   Leuk Esterase: Large / RBC: < 5 /HPF / WBC Many /HPF   Sq Epi: x / Non Sq Epi: 0-5 /HPF / Bacteria: Present /HPF      LIVER FUNCTIONS - ( 2019 14:31 )  Alb: 2.5 g/dL / Pro: 4.5 g/dL / ALK PHOS: 73 U/L / ALT: 9 U/L / AST: 32 U/L / GGT: x           CAPILLARY BLOOD GLUCOSE          RADIOLOGY & ADDITIONAL TESTS:  EXAM:  CT ANGIO CHEST (W)AW IC                          EXAM:  CT ANGIO ABD PELV (W)AW IC                          *** ADDENDUM 2019  ***    Graft reconstruction of the ascending and descending thoracic aorta with   persistent dissection flap within the aortic arch and extending into the   RIGHT common carotid artery.      *** END OF ADDENDUM 2019  ***      PROCEDURE DATE:  2019          INTERPRETATION:  CLINICAL INDICATION: 68-year-old for detection of   aortoenteric fistula.History of gastrointestinal bleeding and history of   recent coil embolization. History of aortic dissection and graft repair.        FINDINGS: CT angiography of the chest, abdomen and pelvis was performed   before and after the administration of intravenous contrast. Multiplanar   and maximum intensity projection 3D reconstructions were performed in the   sagittal and coronal planes. Comparison is made to prior studies dated   10/20/2019, 10/22/2019, 10/17/2019, 10/16/2019 and multiple additional   studies dating back to 2010.    Evaluation of the chest demonstrates stable heterogeneity and nodularity   of the thyroid gland with enlargement of the right lobe with a dominant   nodule measuring 14 mm. There is top normal heart size. Persistent small   left pleural effusion. Negative for thoracic inlet, axillary, mediastinal   or hilar lymphadenopathy. There is coronary arterial calcification.   Negative for mitral annular or aortic valvular calcification. Negative   for intraluminal thrombus within the left atrial appendage. Negative for   enlargement of the main pulmonary artery. No corey central or segmental   pulmonary embolus within the limitations of suboptimal opacification of   the pulmonary artery and its branches. Endotracheal tube in appropriate   position. Evaluation of the lung parenchyma demonstrates no interval   change in multiple regions of tree-in-bud branching clustered   micronodular opacity, most confluent within the subpleural right upper   lobe and left upper lobe, consistent with bronchiolitis. There is left   basilar atelectasis. No pulmonary consolidation or mass. Post surgical   changes along the left lower lobe.     Evaluation of the abdomen demonstrates that the liver, bilateral adrenal   glands and right kidney are normal in appearance. There is mild central   biliary ductal dilatation with distention of the gallbladder with fluid.   There is cortical atrophy of the left kidney with a nephroureteral stent   in stable position; associated avidenhancement of the mucosa of the   proximal left ureter and collecting system, consistent with ureteritis.   Small bilateral renal cysts. There is heterogeneous venous and arterial   hypoperfusion of the spleen, consistent with infarct, not expected given   recent coil embolization. Evaluation of the gastrointestinal tract   demonstrates fecal loading of stool within the rectum. There is no bowel   thickening or bowel obstruction. Evaluation of the pelvis demonstrates   the uterus and adnexal regions are unremarkable. Russo catheter balloon   within the bladder which contains a small volume of fluid. There is   severe anasarca. Presacral edema. No significant free fluid. Small fat   and fluid-containing left inguinal hernia. No adenopathy. Evaluation of   the osseous structures demonstrates posterior thoracic decompression and   no frankly destructive osseous lesion.     Evaluation of the vasculature demonstrates graft repair of the ascending   thoracic aorta with persistent aneurysmal dilatation of the aortic root   spanning 5.0 cm, previously spanning 5.0 cm in 10/2019 and measuring 4.2   cm in 2015. There is persistent dissection flap within the aortic arch   extending into the innominate artery and right common carotid artery,   unchanged since 2015. There is graft repair of the ascending thoracic   aorta with stable small saccular pseudoaneurysm/penetrating ulcer of the   left lateral wall of the descending thoracic aorta spanning 8 mm,   unchanged since 10/2019. There ispersistent dissection flap within the   abdominal aorta extending into the bilateral common iliac arteries and   left external iliac artery, unchanged since 2015. 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 Multiple arterial collaterals within the susie hepatis and   in the perisplenic region with reconstitution of the distal splenic and   hepatic arteries, increased since 2016. Superior mesenteric artery and   bilateral renal arteries arise from the false lumen and are adequately   opacified. Multiple embolization coils in the region of the splenic   artery and gastric artery. Extensive arterial collaterals in the   gastrohepatic ligament and perisplenic region. IVC filter in appropriate   position. The opacified aspects of the hepatic portal, splenic, superior   mesenteric vein, bilateral renal veins, IVC and bilateral iliac veins   demonstrates no corey intraluminal thrombus. There is stable fusiform   aneurysmal dilatation of the infrarenal abdominal aorta spanning 3.2 cm.           IMPRESSION:    No CT evidence of aortoenteric fistula within the limitations of   suboptimal opacification of the arterial vasculature.    Graft reconstruction of the ascending and descending thoracic aorta with   persistent dissection flap within the aortic arch and extending into the   left common carotid artery with stable small penetrating ulcer versus   pseudoaneurysm of the descending thoracic aorta. Progressive aneurysmal   dilatation of aortic root.     Persistent dissection flap within the abdominal aorta extending into the   bilateral common iliac and left external iliac arteries. 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    Stable mild multifocal bronchiolitis.      ***Please see the addendum at the top of this report.It may contain   additional important information or changes.****

## 2019-11-13 NOTE — PROGRESS NOTE ADULT - ASSESSMENT
69yo F h/o aortic dissection s/p multiple repairs, spinal hematoma resulting in paraplegia, and embolization of splenic and L gastric arteries for GI bleeding, now admitted for GI bleed.    - If bleeding continues, patient may need   - No vascular surgical intervention indicated at this time  - Vascular will follow, please call [phone] with any questions 69yo F h/o aortic dissection s/p multiple repairs, spinal hematoma resulting in paraplegia, and embolization of splenic and L gastric arteries for GI bleeding, now admitted for GI bleed.    - No vascular surgical intervention indicated at this time  - Vascular will follow, please call [phone] with any questions

## 2019-11-14 NOTE — CONSULT NOTE ADULT - SUBJECTIVE AND OBJECTIVE BOX
BRENT MONSALVE  Patient is a 68y old  Female who presents with a chief complaint of GI Bleed (12 Nov 2019 23:46).     68F with h/o aortic dissection - multiple repairs (20 years ago), GIB - Known Dieulafoy lesion of stomach/duodenum - recent splenic and left gastric arterial embolizations (11/7 at [facility]), spinal hematoma - paraplegia. baclofen pump in place RLQ Abd, nephrolithiasis w/retained stent (did not followup for removal and instructed), recurrent UTIs - known Hx ESBL organisms - required straight cath, HTN, PAD, and HSV endophthalmitis/keratitis, left corneal transplant admitted for recurrent GI bleed. Pt presented 10/28 to [facility] ED with hypovolumic/septic shock (AMS, Hg 7, LA 8 - given 2 U pRBC), and transferred to [facility]. At [facility], patient received 3u pRBC,  5% albumin 500 and started meropenem for suspected UTI. CTA chest/abdomen perfomed, aortic-entero fistula was ruled out. MICU consulted for potential transfer.     Major events:  10/7 - 10/11: Hospitalized at [place]. EGD, CTA, video capsule, CTA negative. Suspicious for Dieulafoy lesion of stomach/duodenum  10/17 - 10/25: Hospitalized. Repeated endoscopies/imaging - no source.   10/28 - 11/2: Similar presentation. Transferred to [place] for possible gastrectomy.   11/2 - 11/6: No gastrectomy, instead transferred to [facility]. 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 Planned laser lithotripsy & stent exchange 11/7 deferred secondary to transfer to [facility].   11/7 - 11/10: At [place], Patient underwent splenic and left gastric arterial embolizations with vascular surgery, Dr. Rock (11/7). Ongoing melena - another 11/5 - 2 clips placed at site of previous clippings.     CT 10/22: No evidence of active GI bleed.  CT A/P w/IV contrast 10/28: Infrarenal abdominal aortic dissection - not significantly changed.   Mild left hydronephrosis. Left renal collecting system stent. 6 x 9 mm, calculus noted within the left distal ureter, without significant change. Tiny left renal calculus. Large amount of stool in the colon with rectal wall thickening and adjacent perirectal infiltrative changes compatible with rectal impaction, clinically correlate to exclude stercoral proctitis.      given Meropenem for UTI given Hx ESBL      PAST MEDICAL/SURGICAL HISTORY  PAST MEDICAL & SURGICAL HISTORY:  Melena: 10/7/2019  Gastrointestinal hemorrhage: 9/28/2019, 9/4/2019, 8/29/2019  Dieulafoy lesion of stomach or duodenum: 10/1/2019  Subdural hematoma, nontraumatic: spontaneous  Dorsalgia of lumbar region: on pain medication /baclofen po and pump  Self-catheterizes urinary bladder  Anemia: chronic  Uveitis  Osteoporosis  PAD (peripheral artery disease)  Hematoma: spinal treated September 2018  Paraplegia: due to spinal hematoma, on wheelchair goes to physical therapy 2 x weekly  Aortic dissection, thoracic: Type A Repaired 2009  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 2018  UTI (urinary tract infection): recurrent  TIA (transient ischemic attack)  HTN (Hypertension)  History of corneal transplant: left corneal transplant on 5/21/2018  Disorder of spine: unthetethering 2 x  Presence of IVC filter: 2014 ?  S/P aortic dissection repair: Type A Dissection repair /2009   descending aortic aneurysm repair 9/2016  H/O Spinal surgery: laminectomies 2014          T(C): 36.7 (11-14-19 @ 05:01), Max: 37.4 (11-13-19 @ 22:23)  HR: 86 (11-14-19 @ 08:00) (80 - 100)  BP: --  RR: 19 (11-14-19 @ 08:00) (13 - 27)  SpO2: 98% (11-14-19 @ 08:00) (97% - 100%)  Wt(kg): --Vital Signs Last 24 Hrs  T(C): 36.7 (14 Nov 2019 05:01), Max: 37.4 (13 Nov 2019 22:23)  T(F): 98.1 (14 Nov 2019 05:01), Max: 99.3 (13 Nov 2019 22:23)  HR: 86 (14 Nov 2019 08:00) (80 - 100)  BP: --  BP(mean): --  RR: 19 (14 Nov 2019 08:00) (13 - 27)  SpO2: 98% (14 Nov 2019 08:00) (97% - 100%)    PHYSICAL EXAM:  GENERAL: NAD, well-groomed, well-developed  HEAD:  Atraumatic, Normocephalic  EYES: EOMI, PERRLA, conjunctiva and sclera clear  ENMT: No tonsillar erythema, exudates, or enlargement; Moist mucous membranes, Good dentition, No lesions  NECK: Supple, No JVD, Normal thyroid  NERVOUS SYSTEM:  Alert & Oriented X3, Good concentration; Motor Strength 5/5 B/L upper and lower extremities; DTRs 2+ intact and symmetric  CHEST/LUNG: Clear to percussion bilaterally; No rales, rhonchi, wheezing, or rubs  HEART: Regular rate and rhythm; No murmurs, rubs, or gallops  ABDOMEN: Soft, Nontender, Nondistended; Bowel sounds present  EXTREMITIES:  2+ Peripheral Pulses, No clubbing, cyanosis, or edema  LYMPH: No lymphadenopathy noted  SKIN: No rashes or lesions    Consultant(s) Notes Reviewed:  [x ] YES  [ ] NO  Care Discussed with Consultants/Other Providers [ x] YES  [ ] NO    LABS: reviewed        RADIOLOGY & ADDITIONAL TESTS:    Imaging Personally Reviewed:  [x] YES  [ ] NO BRENT MONSALVE  Patient is a 68y old  Female who presents with a chief complaint of GI Bleed (12 Nov 2019 23:46).     68F with h/o aortic dissection - multiple repairs (20 years ago), GIB - Known Dieulafoy lesion of stomach/duodenum - recent splenic and left gastric arterial embolizations (11/7 at [facility]), spinal hematoma - paraplegia. baclofen pump in place RLQ Abd, nephrolithiasis w/retained stent (did not followup for removal and instructed), recurrent UTIs - known Hx ESBL organisms - required straight cath, HTN, PAD, and HSV endophthalmitis/keratitis, left corneal transplant admitted for recurrent GI bleed. Pt presented 10/28 to [facility] ED with hypovolumic/septic shock 2/2 UTI (AMS, Hg 7, LA 8 - given 2 U pRBC), and transferred to [facility]. At [facility], patient received 3u pRBC,  5% albumin 500 and started meropenem for suspected UTI. CTA chest/abdomen perfomed, aortic-entero fistula was ruled out. MICU consulted for potential transfer.     ROS: Melena present. No fever, chills, SOB, CP, palpitations, n/v or diarrhea.     Major events:  10/7 - 10/11: Hospitalized at [place]. EGD, CTA, video capsule, CTA negative. Suspicious for Dieulafoy lesion of stomach/duodenum  10/17 - 10/25: Hospitalized. Repeated endoscopies/imaging - no source.   10/28 - 11/2: Hospitalized, similar presentation. Transferred to [place] for possible gastrectomy.   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 Planned laser lithotripsy & stent exchange 11/7 deferred secondary to transfer to [facility].   11/7 - 11/10: At [place], Patient underwent splenic and left gastric arterial embolizations with vascular surgery, Dr. Rock (11/7). Ongoing melena - another 11/5 - 2 clips placed at site of previous clippings.     PAST MEDICAL/SURGICAL HISTORY  PAST MEDICAL & SURGICAL HISTORY:  Melena: 10/7/2019  Gastrointestinal hemorrhage: 9/28/2019, 9/4/2019, 8/29/2019  Dieulafoy lesion of stomach or duodenum: 10/1/2019  Subdural hematoma, nontraumatic: spontaneous  Dorsalgia of lumbar region: on pain medication /baclofen po and pump  Self-catheterizes urinary bladder  Anemia: chronic  Uveitis  Osteoporosis  PAD (peripheral artery disease)  Hematoma: spinal treated September 2018  Paraplegia: due to spinal hematoma, on wheelchair goes to physical therapy 2 x weekly  Aortic dissection, thoracic: Type A Repaired 2009  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 2018  UTI (urinary tract infection): recurrent  TIA (transient ischemic attack)  HTN (Hypertension)  History of corneal transplant: left corneal transplant on 5/21/2018  Disorder of spine: unthetethering 2 x  Presence of IVC filter: 2014 ?  S/P aortic dissection repair: Type A Dissection repair /2009   descending aortic aneurysm repair 9/2016  H/O Spinal surgery: laminectomies 2014          T(C): 36.7 (11-14-19 @ 05:01), Max: 37.4 (11-13-19 @ 22:23)  HR: 86 (11-14-19 @ 08:00) (80 - 100)  BP: --  RR: 19 (11-14-19 @ 08:00) (13 - 27)  SpO2: 98% (11-14-19 @ 08:00) (97% - 100%)  Wt(kg): --Vital Signs Last 24 Hrs  T(C): 36.7 (14 Nov 2019 05:01), Max: 37.4 (13 Nov 2019 22:23)  T(F): 98.1 (14 Nov 2019 05:01), Max: 99.3 (13 Nov 2019 22:23)  HR: 86 (14 Nov 2019 08:00) (80 - 100)  BP: --  BP(mean): --  RR: 19 (14 Nov 2019 08:00) (13 - 27)  SpO2: 98% (14 Nov 2019 08:00) (97% - 100%)    PHYSICAL EXAM:  GENERAL: NAD, well-groomed, well-developed  HEAD:  Atraumatic, Normocephalic  EYES: EOMI, PERRLA, conjunctiva and sclera clear  ENMT: No tonsillar erythema, exudates, or enlargement; Moist mucous membranes, Good dentition, No lesions  NECK: Supple, No JVD, Normal thyroid  NERVOUS SYSTEM:  Alert & Oriented X3, Good concentration; Motor Strength 5/5 B/L upper and lower extremities; DTRs 2+ intact and symmetric  CHEST/LUNG: Clear to percussion bilaterally; No rales, rhonchi, wheezing, or rubs  HEART: Regular rate and rhythm; No murmurs, rubs, or gallops  ABDOMEN: Soft, Nontender, Nondistended; Bowel sounds present  EXTREMITIES:  2+ Peripheral Pulses, No clubbing, cyanosis, or edema  LYMPH: No lymphadenopathy noted  SKIN: No rashes or lesions    Consultant(s) Notes Reviewed:  [x ] YES  [ ] NO  Care Discussed with Consultants/Other Providers [ x] YES  [ ] NO    LABS: reviewed        RADIOLOGY & ADDITIONAL TESTS:    Imaging Personally Reviewed:  [x] YES  [ ] NO BRENT MONSALVE  Patient is a 68y old  Female who presents with a chief complaint of GI Bleed (12 Nov 2019 23:46).     68F with h/o aortic dissection - multiple repairs (20 years ago), GIB - Known Dieulafoy lesion of stomach/duodenum - recent splenic and left gastric arterial embolizations (11/7 at [facility]), spinal hematoma - paraplegia. baclofen pump in place RLQ Abd, nephrolithiasis w/retained stent (did not followup for removal and instructed), recurrent UTIs - known Hx ESBL organisms - required straight cath, HTN, PAD, and HSV endophthalmitis/keratitis, left corneal transplant admitted for recurrent GI bleed. Pt presented 10/28 to  ED with hypovolumic/septic shock 2/2 UTI (AMS, Hg 7, LA 8 - given 2 U pRBC), and transferred to [facility]. At [facility], patient received 3u pRBC,  5% albumin 500 and started meropenem for suspected UTI. CTA chest/abdomen perfomed, aortic-entero fistula was ruled out. MICU consulted for potential transfer.     ROS: Melena present. No fever, chills, SOB, CP, palpitations, n/v or diarrhea.     Major events:  10/7 - 10/11: Hospitalized at [place]. EGD, CTA, video capsule, CTA negative. Suspicious for Dieulafoy lesion of stomach/duodenum  10/17 - 10/25: Hospitalized. Repeated endoscopies/imaging - no source.   10/28 - 11/2: Hospitalized, similar presentation. Transferred to [place] for possible gastrectomy.   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 Planned laser lithotripsy & stent exchange 11/7 deferred secondary to transfer to [facility].   11/7 - 11/10: At [place], Patient underwent splenic and left gastric arterial embolizations with vascular surgery, Dr. Rock (11/7). Ongoing melena - another 11/5 - 2 clips placed at site of previous clippings.     PAST MEDICAL/SURGICAL HISTORY  PAST MEDICAL & SURGICAL HISTORY:  Melena: 10/7/2019  Gastrointestinal hemorrhage: 9/28/2019, 9/4/2019, 8/29/2019  Dieulafoy lesion of stomach or duodenum: 10/1/2019  Subdural hematoma, nontraumatic: spontaneous  Dorsalgia of lumbar region: on pain medication /baclofen po and pump  Self-catheterizes urinary bladder  Anemia: chronic  Uveitis  Osteoporosis  PAD (peripheral artery disease)  Hematoma: spinal treated September 2018  Paraplegia: due to spinal hematoma, on wheelchair goes to physical therapy 2 x weekly  Aortic dissection, thoracic: Type A Repaired 2009  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 2018  UTI (urinary tract infection): recurrent  TIA (transient ischemic attack)  HTN (Hypertension)  History of corneal transplant: left corneal transplant on 5/21/2018  Disorder of spine: unthetethering 2 x  Presence of IVC filter: 2014 ?  S/P aortic dissection repair: Type A Dissection repair /2009   descending aortic aneurysm repair 9/2016  H/O Spinal surgery: laminectomies 2014          T(C): 36.7 (11-14-19 @ 05:01), Max: 37.4 (11-13-19 @ 22:23)  HR: 86 (11-14-19 @ 08:00) (80 - 100)  BP: --  RR: 19 (11-14-19 @ 08:00) (13 - 27)  SpO2: 98% (11-14-19 @ 08:00) (97% - 100%)  Wt(kg): --Vital Signs Last 24 Hrs  T(C): 36.7 (14 Nov 2019 05:01), Max: 37.4 (13 Nov 2019 22:23)  T(F): 98.1 (14 Nov 2019 05:01), Max: 99.3 (13 Nov 2019 22:23)  HR: 86 (14 Nov 2019 08:00) (80 - 100)  BP: --  BP(mean): --  RR: 19 (14 Nov 2019 08:00) (13 - 27)  SpO2: 98% (14 Nov 2019 08:00) (97% - 100%)    PHYSICAL EXAM:  GENERAL: NAD, well-groomed, well-developed  HEENT:  Atraumatic, Normocephalic, EOMI, PERRLA, conjunctiva and sclera clear, MMM  NERVOUS SYSTEM:  Alert & Oriented X3, CN ii-xii intact. Motor strength 3/5 in RLE, 4/5 in LLE, RUE and LUE. Good concentration; Sensation intact in all four extremities.   CHEST/LUNG: CTAB; No rales, rhonchi, wheezing, or rubs  HEART: Regular rate and rhythm; No murmurs, rubs, or gallops  ABDOMEN: Soft, Nontender, Nondistended; Bowel sounds present, baclofen pump intact.   EXTREMITIES:  2+ Peripheral Pulses, No clubbing, cyanosis, or edema  LYMPH: No lymphadenopathy noted  SKIN: No rashes or lesions    Consultant(s) Notes Reviewed:  [x ] YES  [ ] NO  Care Discussed with Consultants/Other Providers [ x] YES  [ ] NO    LABS: reviewed        RADIOLOGY & ADDITIONAL TESTS:    Imaging Personally Reviewed:  [x] YES  [ ] NO

## 2019-11-14 NOTE — PROGRESS NOTE ADULT - ASSESSMENT
69yo F h/o aortic dissection s/p multiple repairs, spinal hematoma resulting in paraplegia, and embolization of splenic and L gastric arteries for GI bleeding, now admitted for GI bleed.  Intubated for airway protection    EGD with GI showing large amount of old blood in stomach but no active bleeding, presence of submucosal mass non bleeding.   CTA - no active bleeding  Pending EUS as per GI    Plan:   - No acute surgical intervention  - Continue management per CTS, GI and vascular   - Trend CBC  - Maintain 2 large bore IV  - Active type and screen  - Surgery team 2c will follow   - Discussed with attending

## 2019-11-14 NOTE — PROGRESS NOTE ADULT - SUBJECTIVE AND OBJECTIVE BOX
Late entry, pt seen and examined at bedside in AM.  Patient was comfortable in bed speaking in short sentences.  Denies fever, chill, chest pain, shortness of breath, abdominal or epigastric pain, nausea, vomiting.  No further melena per nursing in am.       Allergies    No Known Allergies    Intolerances    MEDICATIONS:  MEDICATIONS  (STANDING):  amLODIPine   Tablet 5 milliGRAM(s) Oral daily  artificial  tears Solution 1 Drop(s) Both EYES two times a day  atorvastatin 40 milliGRAM(s) Oral at bedtime  baclofen 20 milliGRAM(s) Oral every 12 hours  DULoxetine 20 milliGRAM(s) Oral every 12 hours  gabapentin 600 milliGRAM(s) Oral every 8 hours  labetalol 100 milliGRAM(s) Oral every 12 hours  meropenem  IVPB 1000 milliGRAM(s) IV Intermittent every 8 hours  pantoprazole  Injectable 40 milliGRAM(s) IV Push two times a day  prednisoLONE acetate 1% Suspension 1 Drop(s) Both EYES four times a day  sodium chloride 0.9% lock flush 3 milliLiter(s) IV Push every 8 hours  sodium chloride 2% Ophthalmic Solution 1 Drop(s) Both EYES daily  valACYclovir 1000 milliGRAM(s) Oral three times a day    MEDICATIONS  (PRN):  acetaminophen   Tablet .. 650 milliGRAM(s) Oral every 6 hours PRN Mild Pain (1 - 3)  oxycodone    5 mG/acetaminophen 325 mG 1 Tablet(s) Oral every 6 hours PRN Moderate Pain (4 - 6)  oxycodone    5 mG/acetaminophen 325 mG 2 Tablet(s) Oral every 6 hours PRN Severe Pain (7 - 10)    Vital Signs Last 24 Hrs  T(C): 36.5 (14 Nov 2019 18:56), Max: 37.4 (13 Nov 2019 22:23)  T(F): 97.7 (14 Nov 2019 18:56), Max: 99.3 (13 Nov 2019 22:23)  HR: 88 (14 Nov 2019 20:00) (74 - 98)  BP: 119/56 (14 Nov 2019 20:00) (119/56 - 153/69)  BP(mean): 91 (14 Nov 2019 20:00) (88 - 112)  RR: 18 (14 Nov 2019 20:00) (11 - 30)  SpO2: 96% (14 Nov 2019 20:00) (73% - 100%)    11-13 @ 07:01  -  11-14 @ 07:00  --------------------------------------------------------  IN: 2267.5 mL / OUT: 2755 mL / NET: -487.5 mL    11-14 @ 07:01 - 11-14 @ 20:45  --------------------------------------------------------  IN: 370 mL / OUT: 2535 mL / NET: -2165 mL      PHYSICAL EXAM:    General: Elderly female, comfortable in bed, in nad  HEENT: MMM, conjunctiva and sclera clear  Gastrointestinal: Abdomen: Soft, non-tender non-distended; Normal bowel sounds; No rebound or guarding, RLQ device  Skin: Warm and dry.   Neuro: AAOx3, follows command    LABS:                        8.9    13.40 )-----------( 119      ( 14 Nov 2019 19:16 )             27.5     11-14    140  |  110<H>  |  8   ----------------------------<  92  4.7   |  24  |  0.33<L>    Ca    8.1<L>      14 Nov 2019 10:41  Phos  2.4     11-14  Mg     2.0     11-14    TPro  4.6<L>  /  Alb  3.0<L>  /  TBili  1.1  /  DBili  x   /  AST  22  /  ALT  8<L>  /  AlkPhos  71  11-14    PT/INR - ( 14 Nov 2019 10:41 )   PT: 12.3 sec;   INR: 1.09          PTT - ( 14 Nov 2019 03:53 )  PTT:31.7 sec    Culture - Urine (collected 12 Nov 2019 10:24)  Source: .Urine Catheterized  Final Report (14 Nov 2019 10:46):    >100,000 CFU/ml Enterobacter aerogenes  Organism: Enterobacter aerogenes (14 Nov 2019 10:46)  Organism: Enterobacter aerogenes (14 Nov 2019 10:46)    Culture - Urine (collected 12 Nov 2019 00:50)  Source: .Urine None  Final Report (13 Nov 2019 10:35):    >=3 organisms. Probable collection contamination.

## 2019-11-14 NOTE — PROGRESS NOTE ADULT - SUBJECTIVE AND OBJECTIVE BOX
BRENT MONSALVE   MRN#: [id number]     The patient is a 68y Female who was seen, evaluated, & examined with the CTICU staff with a multidisciplinary care plan formulated & implemented.  All available clinical, laboratory, radiographic, pharmacologic, and electrocardiographic data were reviewed & analyzed.      The patient was in the CTICU in critical condition secondary to:     persistent cardiopulmonary dysfunction  acute blood loss anemia    For support and evaluation & prevention of further decompensation secondary to persistent cardiopulmonary dysfunction, respiratory status required:     supplemental oxygen with nasal cannula  continuous pulse oximetry monitoring  following ABGs with A-line monitoring    Invasive hemodynamic monitoring with     an A-line was required for continuous MAP/BP monitoring     to ensure adequate cardiovascular support and to evaluate for & help prevent decompensation while receiving     intermittent volume expansion  blood transfusions    secondary to     hemodynamically significant anemia    In addition:  S/p GI bleed with unclear source s/p splenic and left gastric artery embolization on last admission; prior aortic surgeries, ? if aorto-enteric fistula is present but this is very unlikely  Recent EGD with old, dark blood in stomach, and submucosal gastric mass but no bleeding source - intubated for procedure, now extubated with stable respiratory status  CTA also did not reveal any gastric mass or bleeding source and did show chronic aortic disease/dissections  GI will perform endoscopic ultrasound today - still requiring multiple, frequent blood transfusions - 2 units yesterday and 1 unit overnight - checking CBC q4-6 hours  Hemodynamics have been stable, has not required pressors in over 48 hours despite the unstable hemoglobin  Meropenem for UTI given prior ESBL history - culture is positive for enterobacter - will await sensitivities  Continue NPO  Haldol prn delirium    The patient required critical care management and I personally provided 75 minutes of non-continuous care to the patient, excluding separate procedures, in addition to  discussing the patient and plan at length with the CTICU staff and helping coordinate care.

## 2019-11-14 NOTE — PROGRESS NOTE ADULT - ASSESSMENT
68F with h/o aortic dissection s/p multiple repairs, spinal hematoma resulting in paraplegia (on baclofen pump), recurrent ESBL UTI, HTN, PAD, and HSV endophthalmitis/keratitis s/p left corneal transplant, multiple GIB w/ multiple endoscopic evaluation and required splenic and left gastric arterial embolization 11/7/19 at [facility] now return to [facility] for AMS transferred to [facility] after found to have hypotension and anemia.    #Melena  Patient with acute drop in hgb following melenic stool with hemodynamic instability.  Bedside EGD was notable for old blood in the stomach with mod gastritis and a large gastric body mass and prior hemoclip.  No source of bleeding was identified and no evidence of active bleeding.  Review of CTA without obvious finding to suggest UGIB.  Further discussion with Lloyd Vazquez, intensivist and vascular surgery at length.  Now pending consideration of EUS evaluation of gastric lesion.  Last transfusion 11/13/19 and hgb otherwise stable in 8s.    -NPO at MN for assessment of timing of EUS in AM  -Continue PPI BID IV  -Trend CBC   -Transfuse per primary team  -Maintain active T&S and large bore IV    Please call on-call GI fellow if patient develops overt GI bleeding or change in hemodynamics    Case d/w svc attending

## 2019-11-14 NOTE — CONSULT NOTE ADULT - ASSESSMENT
68F with h/o aortic dissection - multiple repairs (20 years ago), GIB - Known Dieulafoy lesion of stomach/duodenum - recent splenic and left gastric arterial embolizations (11/7 at [facility]), spinal hematoma - paraplegia. baclofen pump in place RLQ Abd, nephrolithiasis w/retained stent (did not followup for removal and instructed), recurrent UTIs - known Hx ESBL organisms - required straight cath, HTN, PAD, and HSV endophthalmitis/keratitis, left corneal transplant admitted for recurrent GI bleed.     HEMODYNAMICS  -likely normal CO, normal SVR, perfusing well, euvolumic  -off pressors/sedation    NEURO  #Functional paraplegia 2/2 complications of aortic dissection  -c/w symptomatic management    CARDIAC  #HTN   -hold BP meds if concerns of ongoing melena    PULM  #Acute respiratory failure: resolved  -patient was emergently intubated for urgent endoscopy upon admission, extubated on 11/13    GI  #UGIB  -Etiology suspicious for dieulafoy's lesion vs gastric mass.   -Bedside EGD was notable for old blood in the stomach with mod gastritis and a large gastric body mass and prior hemoclip. No source of bleeding was identified and no evidence of active bleeding. Review of CTA without obvious finding to suggest UGIB. Now pending consideration of EUS evaluation of gastric lesion.    -Tentative plan for EUS  -Continue PPI BID IV  -Trend CBC  -Transfuse if active bleeding or hemoglobin<7  -Maintain active T&S and large bore IV    ID  #UTI  -UC growing enterobacter aerogenes  -meropenem 11/12 - present  -f/u urine culture    HEME/ONC  #Normocytic Anemia  -likely from UGIB  -plan same as abvoe    RENAL/UROLOGY  #Nephrolithiasis  -nephrolithiasis w/retained stent (did not followup for removal and instructed),  -Planned laser lithotripsy & stent exchange on 11/7 deferred secondary to transfer to [facility]  -f/u urology evaluation    DISPO: pending rounds 68F with h/o aortic dissection - multiple repairs (20 years ago), GIB - Known Dieulafoy lesion of stomach/duodenum - recent splenic and left gastric arterial embolizations (11/7 at [facility]), spinal hematoma - paraplegia. baclofen pump in place RLQ Abd, nephrolithiasis w/retained stent (did not followup for removal and instructed), recurrent UTIs - known Hx ESBL organisms - required straight cath, HTN, PAD, and HSV endophthalmitis/keratitis, left corneal transplant admitted for recurrent GI bleed.     HEMODYNAMICS  -likely normal CO, normal SVR, perfusing well, euvolumic  -off pressors/sedation    NEURO  #Functional paraplegia 2/2 complications of aortic dissection  -c/w symptomatic management    CARDIAC  #HTN   -hold BP meds if concerns of ongoing melena    PULM  #Acute respiratory failure: resolved  -patient was emergently intubated for urgent endoscopy upon admission, extubated on 11/13    GI  #UGIB  -Etiology unclear  -Bedside EGD was notable for old blood in the stomach with mod gastritis and a large gastric body mass and prior hemoclip. No source of bleeding was identified and no evidence of active bleeding. Review of CTA without obvious finding to suggest UGIB. Now pending consideration of EUS evaluation of gastric lesion.    -past hx of dieulafoy's lesion  -Tentative plan for EUS  -Continue PPI BID IV  -Trend CBC  -Transfuse if active bleeding or hemoglobin<7  -Maintain active T&S and large bore IV    ID  #UTI  -UC growing enterobacter aerogenes  -meropenem 11/12 - present  -f/u urine culture    HEME/ONC  #Normocytic Anemia  -likely from UGIB  -plan same as abvoe    RENAL/UROLOGY  #Nephrolithiasis  -nephrolithiasis w/retained stent (did not followup for removal and instructed),  -Planned laser lithotripsy & stent exchange on 11/7 deferred secondary to transfer to [facility]  -f/u urology evaluation    DISPO: pending rounds 68F with h/o aortic dissection - multiple repairs (20 years ago), GIB - Known Dieulafoy lesion of stomach/duodenum - recent splenic and left gastric arterial embolizations (11/7 at [facility]), spinal hematoma - paraplegia. baclofen pump in place RLQ Abd, nephrolithiasis w/retained stent (did not followup for removal and instructed), recurrent UTIs - known Hx ESBL organisms - required straight cath, HTN, PAD, and HSV endophthalmitis/keratitis, left corneal transplant admitted for recurrent GI bleed.     HEMODYNAMICS  -likely normal CO, normal SVR, perfusing well, euvolumic  -off pressors/sedation    NEURO  #Functional paraplegia 2/2 complications of aortic dissection  -c/w symptomatic management (has baclofen pump)    CARDIAC  #HTN   -hold BP meds given concerns of ongoing melena    PULM  #Acute respiratory failure: resolved  -patient was emergently intubated for urgent endoscopy upon admission, extubated on 11/13    GI  #UGIB  -Etiology unclear  -Bedside EGD was notable for old blood in the stomach with mod gastritis and a large gastric body mass and prior hemoclip. No source of bleeding was identified and no evidence of active bleeding. Review of CTA without obvious finding to suggest UGIB. Now pending EUS evaluation of gastric lesion.    -past hx of dieulafoy's lesion  -Continue PPI BID IV  -Trend CBC  -Transfuse if active bleeding or hemoglobin<7  -Maintain active T&S and large bore IV    ID  #UTI  -UC growing enterobacter aerogenes  -meropenem 11/12 - present  -f/u urine culture to deescalate  -f/u blood culture    HEME/ONC  #Normocytic Anemia  -likely from UGIB  -plan same as abvoe    RENAL/UROLOGY  #Nephrolithiasis  -nephrolithiasis w/retained stent (did not followup for removal)  -Planned laser lithotripsy & stent exchange on 11/7 but postponed given HD instability  -f/u urology evaluation given septic shock    DISPO: MICU 68F with h/o aortic dissection - multiple repairs (20 years ago), GIB - Known Dieulafoy lesion of stomach/duodenum - recent splenic and left gastric arterial embolizations (11/7 at [facility]), spinal hematoma - paraplegia. baclofen pump in place RLQ Abd, nephrolithiasis w/retained stent (did not followup for removal and instructed), recurrent UTIs - known Hx ESBL organisms - required straight cath, HTN, PAD, and HSV endophthalmitis/keratitis, left corneal transplant admitted for recurrent GI bleed.     HEMODYNAMICS  -likely normal CO, normal SVR, perfusing well, euvolumic  -off pressors/sedation    NEURO  #Functional paraplegia 2/2 complications of aortic dissection  -c/w symptomatic management (has baclofen pump)    CARDIAC  #HTN   -hold BP meds given concerns of ongoing melena    PULM  #Acute respiratory failure: resolved  -patient was emergently intubated for urgent endoscopy upon admission, extubated on 11/13    GI  #UGIB  -Etiology unclear  -Bedside EGD was notable for old blood in the stomach with mod gastritis and a large gastric body mass and prior hemoclip. No source of bleeding was identified and no evidence of active bleeding. Review of CTA without obvious finding to suggest UGIB. Now pending EUS evaluation of gastric lesion.    -past hx of dieulafoy's lesion, but no active lesions  -Continue PPI BID IV  -Trend CBC  -Transfuse if active bleeding or hemoglobin<7  -Maintain active T&S and large bore IV    ID  #UTI  -UC growing enterobacter aerogenes  -meropenem 11/12 - present  -f/u urine culture to deescalate  -f/u blood culture    HEME/ONC  #Normocytic Anemia  -likely from UGIB  -plan same as abvoe    RENAL/UROLOGY  #Nephrolithiasis  -nephrolithiasis w/retained stent (did not followup for removal)  -Planned laser lithotripsy & stent exchange on 11/7 but postponed given HD instability  -f/u urology evaluation given septic shock    DISPO: MICU 68F with h/o aortic dissection - multiple repairs (20 years ago), GIB - Known Dieulafoy lesion of stomach/duodenum - recent splenic and left gastric arterial embolizations (11/7 at [facility]), spinal hematoma - paraplegia. baclofen pump in place RLQ Abd, nephrolithiasis w/retained stent (did not followup for removal and instructed), recurrent UTIs - known Hx ESBL organisms - required straight cath, HTN, PAD, and HSV endophthalmitis/keratitis, left corneal transplant admitted for recurrent GI bleed.     HEMODYNAMICS  -likely normal CO, normal SVR, perfusing well, euvolumic  -off pressors/sedation    NEURO  #Functional paraplegia 2/2 complications of aortic dissection  -c/w symptomatic management (has baclofen pump)    CARDIAC  #HTN   -hold BP meds given concerns of ongoing melena    PULM  #Acute respiratory failure: resolved  -patient was emergently intubated for urgent endoscopy upon admission, extubated on 11/13    GI  #UGIB  -Etiology unclear  -Bedside EGD was notable for old blood in the stomach with mod gastritis and a large gastric body mass and prior hemoclip. No source of bleeding was identified and no evidence of active bleeding. Review of CTA without obvious finding to suggest UGIB. Now pending EUS evaluation of gastric lesion.    -past hx of dieulafoy's lesion, but no active lesions  -Continue PPI BID IV  -Trend CBC  -Transfuse if active bleeding or hemoglobin<7  -Maintain active T&S and large bore IV    ID  #UTI  -UC growing enterobacter aerogenes  -meropenem 11/12 - present  -f/u urine culture to deescalate  -f/u blood culture    HEME/ONC  #Normocytic Anemia  -likely from UGIB  -plan same as abvoe    RENAL/UROLOGY  #Nephrolithiasis/recurrent UTI  -nephrolithiasis w/retained stent (did not followup for removal)  -Planned laser lithotripsy & stent exchange on 11/7 but postponed given HD instability  -f/u urology evaluation for stent exchange    DISPO: MICU

## 2019-11-14 NOTE — PROGRESS NOTE ADULT - SUBJECTIVE AND OBJECTIVE BOX
24 hr events    Subjective: Patient seen and examined bedside. NAOE. Extubated. Hemodynamically stable. Denies f/c/n/v and diarrhoea. Denies abdominal pain. No further episodes of melena or GI bleed      Vital Signs Last 24 Hrs  T(C): 36.7 (2019 05:01), Max: 37.4 (2019 22:23)  T(F): 98.1 (2019 05:01), Max: 99.3 (2019 22:23)  HR: 86 (2019 08:00) (80 - 100)  BP: --  BP(mean): --  RR: 19 (2019 08:00) (13 - 27)  SpO2: 98% (:) (97% - 100%)    I&O's Summary    2019 07:01  -  2019 07:00  --------------------------------------------------------  IN: 2267.5 mL / OUT: 2755 mL / NET: -487.5 mL    2019 07:01  -  2019 08:51  --------------------------------------------------------  IN: 50 mL / OUT: 110 mL / NET: -60 mL        Physical Exam:  Constitutional: AXOX3  Respiratory: Unlabored  Cardiovascular: S1S2  Gastrointestinal: soft, mildly distended, non-tender  Extremities: paraplegic; 2+ DP pulses, WWP    Lines/drains/tubes:    LABS:                        8.4    13.92 )-----------( 104      ( 2019 03:53 )             25.9     11-14    143  |  112<H>  |  10  ----------------------------<  93  3.7   |  23  |  0.35<L>    Ca    8.0<L>      2019 03:53  Phos  2.5     11-14  Mg     2.0         TPro  4.6<L>  /  Alb  3.0<L>  /  TBili  1.1  /  DBili  x   /  AST  22  /  ALT  8<L>  /  AlkPhos  71      PT/INR - ( 2019 03:53 )   PT: 13.1 sec;   INR: 1.15          PTT - ( 2019 03:53 )  PTT:31.7 sec  Urinalysis Basic - ( 2019 16:02 )    Color: Yellow / Appearance: Clear / S.010 / pH: x  Gluc: x / Ketone: NEGATIVE  / Bili: Negative / Urobili: 0.2 E.U./dL   Blood: x / Protein: NEGATIVE mg/dL / Nitrite: NEGATIVE   Leuk Esterase: Large / RBC: < 5 /HPF / WBC Many /HPF   Sq Epi: x / Non Sq Epi: 0-5 /HPF / Bacteria: Present /HPF      LIVER FUNCTIONS - ( 2019 03:53 )  Alb: 3.0 g/dL / Pro: 4.6 g/dL / ALK PHOS: 71 U/L / ALT: 8 U/L / AST: 22 U/L / GGT: x           CAPILLARY BLOOD GLUCOSE          RADIOLOGY & ADDITIONAL TESTS:

## 2019-11-14 NOTE — PROGRESS NOTE ADULT - ASSESSMENT
68F with h/o aortic dissection - multiple repairs (20 years ago), GIB - Known Dieulafoy lesion of stomach/duodenum - recent splenic and left gastric arterial embolizations (11/7 at [facility]), spinal hematoma - paraplegia. baclofen pump in place RLQ Abd, nephrolithiasis w/retained ureteral stent (did not followup for removal), recurrent UTIs - known Hx ESBL organisms - required straight cath, HTN, PAD, and HSV endophthalmitis/keratitis, left corneal transplant admitted for recurrent GI bleed and UTI.     HEMODYNAMICS  -likely normal CO, normal SVR, perfusing well, euvolemic  -off pressors/sedation    NEURO  #Functional paraplegia 2/2 complications of aortic dissection  -c/w symptomatic management  -on baclofen 20 mg q12h, duloxetine 20 mg q12h, gabapentin 600 mg q8h    CARDIAC  #HTN   - c/w amlodipine 5 mg q24h  - pt on home labetalol 300 mg BID, have restarted pt on labetalol 100 mg q12h  - goal SBP <140    PULM  #Acute respiratory failure: resolved  -patient was emergently intubated for urgent endoscopy upon admission, extubated on 11/13    GI  #UGIB  - 11/14  Bedside EGD was notable for old blood in the stomach with mod gastritis and a large gastric body mass and prior hemoclip. No source of bleeding was identified and no evidence of active bleeding. Review of CTA without obvious finding to suggest UGIB. Pt is now pending EUS evaluation of gastric lesion.    - past hx of dieulafoy's lesion, but no active lesions  - no current episodes of melena.   - Continue PPI BID IV  -Trend CBC q6h  - Transfuse if active bleeding or hemoglobin<7  - Maintain active T&S and large bore IV    ID  #UTI  - pt straight caths herself. pt w/ hx of esbl   -UCx growing enterobacter aerogenes   -meropenem 11/12 - present  -sensitive to cefepime, however will hold off on deescalating until urology evaluates pt's ureteral stent.  -f/u blood culture  -call epidemiology in AM regarding contact precautions    HEME  #Normocytic Anemia  -likely from UGIB  -plan same as above    RENAL/UROLOGY  #Nephrolithiasis/recurrent UTI  -nephrolithiasis w/retained ureteral stent (did not followup for removal)  -Planned laser lithotripsy & stent exchange on 11/7 but postponed given HD instability  -f/u urology evaluation for stent exchange 68F with h/o aortic dissection - multiple repairs (20 years ago), GIB - Known Dieulafoy lesion of stomach/duodenum - recent splenic and left gastric arterial embolizations (11/7 at [facility]), spinal hematoma - paraplegia. baclofen pump in place RLQ Abd, nephrolithiasis w/retained ureteral stent (did not followup for removal), recurrent UTIs - known Hx ESBL organisms - required straight cath, HTN, PAD, and HSV endophthalmitis/keratitis, left corneal transplant admitted for recurrent GI bleed and UTI.     HEMODYNAMICS  -likely normal CO, normal SVR, perfusing well, euvolemic  -off pressors/sedation    NEURO  #Functional paraplegia 2/2 complications of aortic dissection  - c/w symptomatic management  - on baclofen 20 mg q12h, duloxetine 20 mg q12h, gabapentin 600 mg q8h  - on oxycodone prn for pain    CARDIAC  #HTN   - c/w amlodipine 5 mg q24h  - pt on home labetalol 300 mg BID, have restarted pt on labetalol 100 mg q12h  - goal SBP <140    PULM  #Acute respiratory failure: resolved  -patient was emergently intubated for urgent endoscopy upon admission, extubated on 11/13    GI  #UGIB  - 11/14  Bedside EGD was notable for old blood in the stomach with mod gastritis and a large gastric body mass and prior hemoclip. No source of bleeding was identified and no evidence of active bleeding. Review of CTA without obvious finding to suggest UGIB. Pt is now pending EUS evaluation of gastric lesion.    - past hx of dieulafoy's lesion, but no active lesions  - no current episodes of melena.   - Continue PPI BID IV  -Trend CBC q6h  - Transfuse if active bleeding or hemoglobin<7  - Maintain active T&S and large bore IV    ID  #UTI  - pt straight caths herself. pt w/ hx of esbl   -UCx growing enterobacter aerogenes   -meropenem 11/12 - present  -sensitive to cefepime, however will hold off on deescalating until urology evaluates pt's ureteral stent.  -f/u blood culture  -call epidemiology in AM regarding contact precautions    HEME  #Normocytic Anemia  -likely from UGIB  -plan same as above    RENAL/UROLOGY  #Nephrolithiasis/recurrent UTI  -nephrolithiasis w/retained ureteral stent (did not followup for removal)  -Planned laser lithotripsy & stent exchange on 11/7 but postponed given HD instability  -f/u urology evaluation for stent exchange 68F with h/o aortic dissection - multiple repairs (20 years ago), GIB - Known Dieulafoy lesion of stomach/duodenum - recent splenic and left gastric arterial embolizations (11/7 at [facility]), spinal hematoma - paraplegia. baclofen pump in place RLQ Abd, nephrolithiasis w/retained ureteral stent (did not followup for removal), recurrent UTIs - known Hx ESBL organisms - required straight cath, HTN, PAD, and HSV endophthalmitis/keratitis, left corneal transplant admitted for recurrent GI bleed and UTI.     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    PULM  #Acute respiratory failure: resolved  -patient was emergently intubated for urgent endoscopy upon admission, successfully extubated on 11/13    GI  #UGIB  - 11/12 Bedside EGD was notable for old blood in the stomach with mod gastritis and a large gastric body mass and prior hemoclip. No source of bleeding was identified and no evidence of active bleeding. Review of CTA without obvious finding to suggest UGIB. Pt is now pending EUS evaluation of gastric mass.    - past hx of dieulafoy's lesion, but no active lesions  - no current episodes of melena.   - Continue pantoprazole 40 mg BID IV  - Trend CBC q6h  - Transfuse if active bleeding or hemoglobin<7  - Maintain active T&S and large bore IV    ID  #UTI  - pt straight caths herself. pt w/ hx of esbl organisms  - UCx growing enterobacter aerogenes   -c/w meropenem 11/12 - present  -sensitive to cefepime, however will hold off on deescalating until urology evaluates pt's ureteral stent.  -f/u blood culture  -call epidemiology in AM regarding contact precautions    HEME  #Normocytic Anemia  -likely from UGIB  -plan same as above    RENAL/UROLOGY  #Nephrolithiasis/recurrent UTI  -nephrolithiasis w/retained ureteral stent (did not followup for removal)  -Planned laser lithotripsy & stent exchange on 11/7 but postponed given HD instability  -f/u urology evaluation for stent exchange 68F with h/o aortic dissection - multiple repairs (20 years ago), GIB - Known Dieulafoy lesion of stomach/duodenum - recent splenic and left gastric arterial embolizations (11/7 at [facility]), spinal hematoma - paraplegia. baclofen pump in place RLQ Abd, nephrolithiasis w/retained ureteral stent (did not followup for removal), recurrent UTIs - known Hx ESBL organisms - required straight cath, HTN, PAD, and HSV endophthalmitis/keratitis, left corneal transplant admitted for recurrent GI bleed and UTI.     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 - f/u vascular recs.  - on Labetalol 100 mg q12h, amlodipine 5 mg q24, goal SBP <140    PULM  #Acute respiratory failure: resolved  -patient was emergently intubated for urgent endoscopy upon admission, successfully extubated on 11/13    GI  #UGIB  - 11/12 Bedside EGD was notable for old blood in the stomach with mod gastritis and a large gastric body mass and prior hemoclip. No source of bleeding was identified and no evidence of active bleeding. Review of CTA without obvious finding to suggest UGIB. Pt is now pending EUS evaluation of gastric mass.    - past hx of dieulafoy's lesion, but no active lesions  - no current episodes of melena.   - Continue pantoprazole 40 mg BID IV  - Trend CBC q6h  - Transfuse if active bleeding or hemoglobin<7  - Maintain active T&S and large bore IV    ID  #UTI  - pt straight caths herself. pt w/ hx of esbl organisms  - UCx growing enterobacter aerogenes   -c/w meropenem 11/12 - present  -sensitive to cefepime, however will hold off on deescalating until urology evaluates pt's ureteral stent.  -f/u blood culture  -call epidemiology in AM regarding contact precautions    HEME  #Normocytic Anemia  -likely from UGIB  -plan same as above    RENAL/UROLOGY  #Nephrolithiasis/recurrent UTI  -nephrolithiasis w/retained ureteral stent (did not followup for removal)  -Planned laser lithotripsy & stent exchange on 11/7 but postponed given HD instability  -f/u urology evaluation for stent exchange    F: none  E: Replete electrolytes for K < 4.0 and Mg< 2.0   N: clear liquids, NPO at midnight  DVT: SCDs  GI: Pantoprozole   Lines: R arterial line placed, Right IJ  Russo placed

## 2019-11-14 NOTE — PROGRESS NOTE ADULT - SUBJECTIVE AND OBJECTIVE BOX
Hospital Course:  Pt is 68F with h/o aortic dissection - multiple repairs (20 years ago), GIB - possible Dieulafoy lesion of stomach/duodenum - recent splenic and left gastric arterial embolizations (11/7 at [facility]), spinal hematoma - paraplegia - baclofen pump in place RLQ Abd, nephrolithiasis w/ retained ureteral stent (due for stent exchange w/ urology), recurrent UTIs - known Hx ESBL organisms - required straight cath, HTN, PAD, and HSV endophthalmitis/keratitis, left corneal transplant initially presenting w/ AMS, hypotension, now admitted to [facility] for recurrent GI bleed and UTI. Pt found to have hypovolemic/septic shock 2/2 to upper GI bleed and UTI. Pt has since received 3u pRBC and was started meropenem for suspected ESBL UTI. CTA chest/abdomen was performed, aortic-entero fistula was ruled out. 11/13 Bedside endoscope done by GI showed old blood in the stomach with moderate gastritis and a large gastric body mass and prior hemoclip.  No source of bleeding was identified and no evidence of active bleeding. Plan for EUS by GI. Pt transferred from CT surgery to MICU for further management.     SUBJECTIVE / INTERVAL HPI: Patient seen and examined at bedside.     VITAL SIGNS:  Vital Signs Last 24 Hrs  T(C): 36.8 (14 Nov 2019 10:00), Max: 37.4 (13 Nov 2019 22:23)  T(F): 98.3 (14 Nov 2019 10:00), Max: 99.3 (13 Nov 2019 22:23)  HR: 78 (14 Nov 2019 16:00) (76 - 100)  BP: --  BP(mean): --  RR: 17 (14 Nov 2019 16:00) (11 - 30)  SpO2: 94% (14 Nov 2019 16:00) (93% - 100%)    PHYSICAL EXAM:  ********  General: WDWN  HEENT: NC/AT; PERRL, anicteric sclera; MMM  Neck: supple  Cardiovascular: +S1/S2; RRR  Respiratory: CTA B/L; no W/R/R  Gastrointestinal: soft, NT/ND; +BSx4  Extremities: WWP; no edema, clubbing or cyanosis  Vascular: 2+ radial, DP/PT pulses B/L  Neurological: AAOx3; no focal deficits    MEDICATIONS:  MEDICATIONS  (STANDING):  amLODIPine   Tablet 5 milliGRAM(s) Oral daily  artificial  tears Solution 1 Drop(s) Both EYES two times a day  atorvastatin 40 milliGRAM(s) Oral at bedtime  baclofen 20 milliGRAM(s) Oral every 12 hours  DULoxetine 20 milliGRAM(s) Oral every 12 hours  gabapentin 600 milliGRAM(s) Oral every 8 hours  meropenem  IVPB 1000 milliGRAM(s) IV Intermittent every 8 hours  pantoprazole  Injectable 40 milliGRAM(s) IV Push two times a day  prednisoLONE acetate 1% Suspension 1 Drop(s) Both EYES four times a day  sodium chloride 0.9% lock flush 3 milliLiter(s) IV Push every 8 hours  sodium chloride 2% Ophthalmic Solution 1 Drop(s) Both EYES daily  valACYclovir 1000 milliGRAM(s) Oral three times a day    MEDICATIONS  (PRN):  acetaminophen   Tablet .. 650 milliGRAM(s) Oral every 6 hours PRN Mild Pain (1 - 3)  oxycodone    5 mG/acetaminophen 325 mG 1 Tablet(s) Oral every 6 hours PRN Moderate Pain (4 - 6)  oxycodone    5 mG/acetaminophen 325 mG 2 Tablet(s) Oral every 6 hours PRN Severe Pain (7 - 10)  zolpidem 5 milliGRAM(s) Oral at bedtime PRN Insomnia      ALLERGIES:  Allergies    No Known Allergies    Intolerances        LABS:                        8.8    15.05 )-----------( 111      ( 14 Nov 2019 10:41 )             27.1     11-14    140  |  110<H>  |  8   ----------------------------<  92  4.7   |  24  |  0.33<L>    Ca    8.1<L>      14 Nov 2019 10:41  Phos  2.4     11-14  Mg     2.0     11-14    TPro  4.6<L>  /  Alb  3.0<L>  /  TBili  1.1  /  DBili  x   /  AST  22  /  ALT  8<L>  /  AlkPhos  71  11-14    PT/INR - ( 14 Nov 2019 10:41 )   PT: 12.3 sec;   INR: 1.09          PTT - ( 14 Nov 2019 03:53 )  PTT:31.7 sec    CAPILLARY BLOOD GLUCOSE          RADIOLOGY & ADDITIONAL TESTS: Reviewed. Hospital Course:  Pt is 68F with h/o aortic dissection - multiple repairs (20 years ago), GIB - possible Dieulafoy lesion of stomach/duodenum - recent splenic and left gastric arterial embolizations (11/7 at [facility]), spinal hematoma - paraplegia - baclofen pump in place RLQ Abd, nephrolithiasis w/ retained ureteral stent (due for stent exchange w/ urology), recurrent UTIs - known Hx ESBL organisms - requires self straight cath, HTN, PAD, and HSV endophthalmitis/keratitis, left corneal transplant initially presenting w/ AMS, hypotension, now admitted to [facility] for recurrent GI bleed and UTI. Pt found to have hypovolemic/septic shock 2/2 to upper GI bleed and UTI. Pt has since received 3u pRBC and was started meropenem for suspected ESBL UTI. CTA chest/abdomen was performed, aortic-entero fistula was ruled out. 11/13 Bedside endoscope done by GI showed old blood in the stomach with moderate gastritis and a large gastric body mass and prior hemoclip.  No source of bleeding was identified and no evidence of active bleeding. Plan for EUS by GI. Pt transferred from CT surgery to MICU for further management.     SUBJECTIVE / INTERVAL HPI: Patient seen and examined at bedside.     VITAL SIGNS:  Vital Signs Last 24 Hrs  T(C): 36.8 (14 Nov 2019 10:00), Max: 37.4 (13 Nov 2019 22:23)  T(F): 98.3 (14 Nov 2019 10:00), Max: 99.3 (13 Nov 2019 22:23)  HR: 78 (14 Nov 2019 16:00) (76 - 100)  BP: --  BP(mean): --  RR: 17 (14 Nov 2019 16:00) (11 - 30)  SpO2: 94% (14 Nov 2019 16:00) (93% - 100%)    PHYSICAL EXAM:  ********  General: WDWN  HEENT: NC/AT; PERRL, anicteric sclera; MMM  Neck: supple  Cardiovascular: +S1/S2; RRR  Respiratory: CTA B/L; no W/R/R  Gastrointestinal: soft, NT/ND; +BSx4  Extremities: WWP; no edema, clubbing or cyanosis  Vascular: 2+ radial, DP/PT pulses B/L  Neurological: AAOx3; no focal deficits    MEDICATIONS:  MEDICATIONS  (STANDING):  amLODIPine   Tablet 5 milliGRAM(s) Oral daily  artificial  tears Solution 1 Drop(s) Both EYES two times a day  atorvastatin 40 milliGRAM(s) Oral at bedtime  baclofen 20 milliGRAM(s) Oral every 12 hours  DULoxetine 20 milliGRAM(s) Oral every 12 hours  gabapentin 600 milliGRAM(s) Oral every 8 hours  meropenem  IVPB 1000 milliGRAM(s) IV Intermittent every 8 hours  pantoprazole  Injectable 40 milliGRAM(s) IV Push two times a day  prednisoLONE acetate 1% Suspension 1 Drop(s) Both EYES four times a day  sodium chloride 0.9% lock flush 3 milliLiter(s) IV Push every 8 hours  sodium chloride 2% Ophthalmic Solution 1 Drop(s) Both EYES daily  valACYclovir 1000 milliGRAM(s) Oral three times a day    MEDICATIONS  (PRN):  acetaminophen   Tablet .. 650 milliGRAM(s) Oral every 6 hours PRN Mild Pain (1 - 3)  oxycodone    5 mG/acetaminophen 325 mG 1 Tablet(s) Oral every 6 hours PRN Moderate Pain (4 - 6)  oxycodone    5 mG/acetaminophen 325 mG 2 Tablet(s) Oral every 6 hours PRN Severe Pain (7 - 10)  zolpidem 5 milliGRAM(s) Oral at bedtime PRN Insomnia      ALLERGIES:  Allergies    No Known Allergies    Intolerances        LABS:                        8.8    15.05 )-----------( 111      ( 14 Nov 2019 10:41 )             27.1     11-14    140  |  110<H>  |  8   ----------------------------<  92  4.7   |  24  |  0.33<L>    Ca    8.1<L>      14 Nov 2019 10:41  Phos  2.4     11-14  Mg     2.0     11-14    TPro  4.6<L>  /  Alb  3.0<L>  /  TBili  1.1  /  DBili  x   /  AST  22  /  ALT  8<L>  /  AlkPhos  71  11-14    PT/INR - ( 14 Nov 2019 10:41 )   PT: 12.3 sec;   INR: 1.09          PTT - ( 14 Nov 2019 03:53 )  PTT:31.7 sec    CAPILLARY BLOOD GLUCOSE          RADIOLOGY & ADDITIONAL TESTS: Reviewed. Hospital Course:  Pt is 68F with h/o aortic dissection - multiple repairs (20 years ago), GIB - possible Dieulafoy lesion of stomach/duodenum - recent splenic and left gastric arterial embolizations (11/7 at [facility]), spinal hematoma - paraplegia - baclofen pump in place RLQ Abd, nephrolithiasis w/ retained ureteral stent (due for stent exchange w/ urology), recurrent UTIs - known Hx ESBL organisms - requires self straight cath, HTN, PAD, and HSV endophthalmitis/keratitis, left corneal transplant initially presenting w/ AMS, hypotension, now admitted to [facility] for recurrent GI bleed and UTI. Pt found to have hypovolemic/septic shock 2/2 to upper GI bleed and UTI. Pt has since received 3u pRBC and was started meropenem for suspected ESBL UTI. CTA chest/abdomen was performed, aortic-entero fistula was ruled out. 11/13 Bedside endoscope done by GI showed old blood in the stomach with moderate gastritis and a large gastric body mass and prior hemoclip.  No source of bleeding was identified and no evidence of active bleeding. Plan for EUS by GI. Pt transferred from CT surgery to MICU for further management.     SUBJECTIVE / INTERVAL HPI: Patient seen and examined at bedside. Pt complaining of 10/10 back pain and states she is in uncomfortable position. Denies fever, chills, chest pain, SOB, abd pain, N/V/D.     VITAL SIGNS:  Vital Signs Last 24 Hrs  T(C): 36.8 (14 Nov 2019 10:00), Max: 37.4 (13 Nov 2019 22:23)  T(F): 98.3 (14 Nov 2019 10:00), Max: 99.3 (13 Nov 2019 22:23)  HR: 78 (14 Nov 2019 16:00) (76 - 100)  BP: --  BP(mean): --  RR: 17 (14 Nov 2019 16:00) (11 - 30)  SpO2: 94% (14 Nov 2019 16:00) (93% - 100%)    PHYSICAL EXAM:  General: paraplegic female resting in bed, appears uncomfortable   HEENT: NC/AT; PERRL, anicteric sclera; MMM  Neck: supple  Cardiovascular: +S1/S2; RRR, no m/g/r  Respiratory: CTA B/L; no W/R/R  Gastrointestinal: soft, NT/ND; +BSx4  Extremities: WWP; no edema, clubbing or cyanosis  Vascular: 2+ radial, DP/PT pulses B/L  Neurological: AAOx3; pt with 5/5 strength b/l UE and 4/5 LLE, 3/5 strength RLE. Sensations intact in b/l UE and LE.     MEDICATIONS:  MEDICATIONS  (STANDING):  amLODIPine   Tablet 5 milliGRAM(s) Oral daily  artificial  tears Solution 1 Drop(s) Both EYES two times a day  atorvastatin 40 milliGRAM(s) Oral at bedtime  baclofen 20 milliGRAM(s) Oral every 12 hours  DULoxetine 20 milliGRAM(s) Oral every 12 hours  gabapentin 600 milliGRAM(s) Oral every 8 hours  meropenem  IVPB 1000 milliGRAM(s) IV Intermittent every 8 hours  pantoprazole  Injectable 40 milliGRAM(s) IV Push two times a day  prednisoLONE acetate 1% Suspension 1 Drop(s) Both EYES four times a day  sodium chloride 0.9% lock flush 3 milliLiter(s) IV Push every 8 hours  sodium chloride 2% Ophthalmic Solution 1 Drop(s) Both EYES daily  valACYclovir 1000 milliGRAM(s) Oral three times a day    MEDICATIONS  (PRN):  acetaminophen   Tablet .. 650 milliGRAM(s) Oral every 6 hours PRN Mild Pain (1 - 3)  oxycodone    5 mG/acetaminophen 325 mG 1 Tablet(s) Oral every 6 hours PRN Moderate Pain (4 - 6)  oxycodone    5 mG/acetaminophen 325 mG 2 Tablet(s) Oral every 6 hours PRN Severe Pain (7 - 10)  zolpidem 5 milliGRAM(s) Oral at bedtime PRN Insomnia      ALLERGIES:  Allergies    No Known Allergies    Intolerances        LABS:                        8.8    15.05 )-----------( 111      ( 14 Nov 2019 10:41 )             27.1     11-14    140  |  110<H>  |  8   ----------------------------<  92  4.7   |  24  |  0.33<L>    Ca    8.1<L>      14 Nov 2019 10:41  Phos  2.4     11-14  Mg     2.0     11-14    TPro  4.6<L>  /  Alb  3.0<L>  /  TBili  1.1  /  DBili  x   /  AST  22  /  ALT  8<L>  /  AlkPhos  71  11-14    PT/INR - ( 14 Nov 2019 10:41 )   PT: 12.3 sec;   INR: 1.09          PTT - ( 14 Nov 2019 03:53 )  PTT:31.7 sec    CAPILLARY BLOOD GLUCOSE          RADIOLOGY & ADDITIONAL TESTS: Reviewed. Hospital Course:  Pt is 68F with h/o aortic dissection - multiple repairs (20 years ago), GIB - possible Dieulafoy lesion of stomach/duodenum - recent splenic and left gastric arterial embolizations (11/7 at [facility]), spinal hematoma - paraplegia - baclofen pump in place RLQ Abd, nephrolithiasis w/ retained ureteral stent (due for stent exchange w/ urology), recurrent UTIs - known Hx ESBL organisms - requires self straight cath, HTN, PAD, and HSV endophthalmitis/keratitis, left corneal transplant initially presenting w/ AMS, hypotension, now admitted to [facility] for recurrent GI bleed and UTI. Pt found to have hypovolemic/septic shock 2/2 to upper GI bleed and UTI. Pt has since received 3u pRBC and was started meropenem for suspected ESBL UTI. CTA chest/abdomen was performed, aortic-entero fistula was ruled out. 11/13 Bedside endoscope done by GI showed old blood in the stomach with moderate gastritis and a large gastric body mass and prior hemoclip.  No source of bleeding was identified and no evidence of active bleeding. Plan for EUS by GI. Pt transferred from CT surgery to MICU for further management.     SUBJECTIVE / INTERVAL HPI: Patient seen and examined at bedside. Pt complaining of 10/10 back pain and states she is in uncomfortable position. Denies fever, chills, chest pain, SOB, abd pain, N/V/D.     VITAL SIGNS:  Vital Signs Last 24 Hrs  T(C): 36.8 (14 Nov 2019 10:00), Max: 37.4 (13 Nov 2019 22:23)  T(F): 98.3 (14 Nov 2019 10:00), Max: 99.3 (13 Nov 2019 22:23)  HR: 78 (14 Nov 2019 16:00) (76 - 100)  BP: --  BP(mean): --  RR: 17 (14 Nov 2019 16:00) (11 - 30)  SpO2: 94% (14 Nov 2019 16:00) (93% - 100%)    PHYSICAL EXAM:  General: paraplegic female resting in bed, appears uncomfortable   HEENT: NC/AT; PERRL, anicteric sclera; MMM  Neck: supple  Cardiovascular: +S1/S2; RRR, no m/g/r  Respiratory: CTA B/L; no W/R/R  Gastrointestinal: soft, NT/ND; +BSx4  Extremities: WWP; no edema, clubbing or cyanosis  Vascular: 2+ radial, DP/PT pulses B/L  Skin: healing sacral decubitus ulcer  Neurological: AAOx3; pt with 5/5 strength b/l UE and 4/5 LLE, 3/5 strength RLE. Sensations intact in b/l UE and LE.     MEDICATIONS:  MEDICATIONS  (STANDING):  amLODIPine   Tablet 5 milliGRAM(s) Oral daily  artificial  tears Solution 1 Drop(s) Both EYES two times a day  atorvastatin 40 milliGRAM(s) Oral at bedtime  baclofen 20 milliGRAM(s) Oral every 12 hours  DULoxetine 20 milliGRAM(s) Oral every 12 hours  gabapentin 600 milliGRAM(s) Oral every 8 hours  meropenem  IVPB 1000 milliGRAM(s) IV Intermittent every 8 hours  pantoprazole  Injectable 40 milliGRAM(s) IV Push two times a day  prednisoLONE acetate 1% Suspension 1 Drop(s) Both EYES four times a day  sodium chloride 0.9% lock flush 3 milliLiter(s) IV Push every 8 hours  sodium chloride 2% Ophthalmic Solution 1 Drop(s) Both EYES daily  valACYclovir 1000 milliGRAM(s) Oral three times a day    MEDICATIONS  (PRN):  acetaminophen   Tablet .. 650 milliGRAM(s) Oral every 6 hours PRN Mild Pain (1 - 3)  oxycodone    5 mG/acetaminophen 325 mG 1 Tablet(s) Oral every 6 hours PRN Moderate Pain (4 - 6)  oxycodone    5 mG/acetaminophen 325 mG 2 Tablet(s) Oral every 6 hours PRN Severe Pain (7 - 10)  zolpidem 5 milliGRAM(s) Oral at bedtime PRN Insomnia      ALLERGIES:  Allergies    No Known Allergies    Intolerances        LABS:                        8.8    15.05 )-----------( 111      ( 14 Nov 2019 10:41 )             27.1     11-14    140  |  110<H>  |  8   ----------------------------<  92  4.7   |  24  |  0.33<L>    Ca    8.1<L>      14 Nov 2019 10:41  Phos  2.4     11-14  Mg     2.0     11-14    TPro  4.6<L>  /  Alb  3.0<L>  /  TBili  1.1  /  DBili  x   /  AST  22  /  ALT  8<L>  /  AlkPhos  71  11-14    PT/INR - ( 14 Nov 2019 10:41 )   PT: 12.3 sec;   INR: 1.09          PTT - ( 14 Nov 2019 03:53 )  PTT:31.7 sec    CAPILLARY BLOOD GLUCOSE          RADIOLOGY & ADDITIONAL TESTS: Reviewed.

## 2019-11-14 NOTE — PROGRESS NOTE ADULT - ASSESSMENT
67yo F h/o aortic dissection s/p multiple repairs, spinal hematoma resulting in paraplegia, and embolization of splenic and L gastric arteries for GI bleeding, now admitted for GI bleed.  - F/u EUS  - No vascular surgical intervention indicated at this time  - Vascular will follow, please call [phone] with any questions

## 2019-11-14 NOTE — PROGRESS NOTE ADULT - SUBJECTIVE AND OBJECTIVE BOX
Pt seen and examined at bedside, no acute event overnight, extubated, haemodynamically stable. Denies fever, chills, nausea, vomiting or diarrhoea. Denies Abd pain. No further episodes of melena or GI bleed      MEDICATIONS  (STANDING):  amLODIPine   Tablet 5 milliGRAM(s) Oral daily  artificial  tears Solution 1 Drop(s) Both EYES two times a day  atorvastatin 40 milliGRAM(s) Oral at bedtime  baclofen 20 milliGRAM(s) Oral every 12 hours  DULoxetine 20 milliGRAM(s) Oral every 12 hours  gabapentin 600 milliGRAM(s) Oral every 8 hours  meropenem  IVPB 1000 milliGRAM(s) IV Intermittent every 8 hours  pantoprazole  Injectable 40 milliGRAM(s) IV Push two times a day  prednisoLONE acetate 1% Suspension 1 Drop(s) Both EYES four times a day  sodium chloride 0.9% lock flush 3 milliLiter(s) IV Push every 8 hours  sodium chloride 2% Ophthalmic Solution 1 Drop(s) Both EYES daily  valACYclovir 1000 milliGRAM(s) Oral three times a day    MEDICATIONS  (PRN):  acetaminophen   Tablet .. 650 milliGRAM(s) Oral every 6 hours PRN Mild Pain (1 - 3)  oxycodone    5 mG/acetaminophen 325 mG 1 Tablet(s) Oral every 6 hours PRN Moderate Pain (4 - 6)  oxycodone    5 mG/acetaminophen 325 mG 2 Tablet(s) Oral every 6 hours PRN Severe Pain (7 - 10)  zolpidem 5 milliGRAM(s) Oral at bedtime PRN Insomnia      Russo:	  [ ] None	[ ] Daily Russo Order Placed	   Indication:	  [ ] Strict I and O's    [ ] Obstruction     [ ] Incontinence + Stage 3 or 4 Decubitus  Central Line:  [ ] None	   [ ]  Medication / TPN Administration     Drips:     ICU Vital Signs Last 24 Hrs  T(C): 36.7 (2019 05:01), Max: 37.4 (2019 22:23)  T(F): 98.1 (2019 05:01), Max: 99.3 (2019 22:23)  HR: 94 (2019 07:00) (80 - 108)  BP: --  BP(mean): --  ABP: 146/62 (2019 07:00) (108/46 - 166/62)  ABP(mean): 100 (2019 07:00) (70 - 106)  RR: 23 (2019 07:00) (13 - 27)  SpO2: 99% (2019 07:00) (97% - 100%)      Physical Exam:  General: NAD  HEENT: NC/AT, EOMI, PERRLA, normal hearing, no oral lesions, neck supple w/o LAD  Pulmonary: Nonlabored breathing, no respiratory distress, CTA-B  Cardiovascular: NSR, no murmurs  Abdominal: soft, NT/ND, +BS, no organomegaly  Extremities: WWP, 5/5 strength x 4, no clubbing/cyanosis/edema  Neuro: A/O x3, CNs II-XII grossly intact, normal motor/sensation, no focal deficits          I&O's Summary    I&O's Detail    2019 07:01  -  2019 07:00  --------------------------------------------------------  IN:    Albumin 5%  - 250 mL: 500 mL    IV PiggyBack: 300 mL    Oral Fluid: 1020 mL    Packed Red Blood Cells: 300 mL    propofol Infusion: 147.5 mL  Total IN: 2267.5 mL    OUT:    Indwelling Catheter - Urethral: 2635 mL  Total OUT: 2635 mL    Total NET: -367.5 mL              LABS:                        8.4    13.92 )-----------( 104      ( 2019 03:53 )             25.9     11-14    143  |  112<H>  |  10  ----------------------------<  93  3.7   |  23  |  0.35<L>    Ca    8.0<L>      2019 03:53  Phos  2.5     11-14  Mg     2.0     -14    TPro  4.6<L>  /  Alb  3.0<L>  /  TBili  1.1  /  DBili  x   /  AST  22  /  ALT  8<L>  /  AlkPhos  71  11-14    PT/INR - ( 2019 03:53 )   PT: 13.1 sec;   INR: 1.15          PTT - ( 2019 03:53 )  PTT:31.7 sec  Urinalysis Basic - ( 2019 16:02 )    Color: Yellow / Appearance: Clear / S.010 / pH: x  Gluc: x / Ketone: NEGATIVE  / Bili: Negative / Urobili: 0.2 E.U./dL   Blood: x / Protein: NEGATIVE mg/dL / Nitrite: NEGATIVE   Leuk Esterase: Large / RBC: < 5 /HPF / WBC Many /HPF   Sq Epi: x / Non Sq Epi: 0-5 /HPF / Bacteria: Present /HPF      CAPILLARY BLOOD GLUCOSE        LIVER FUNCTIONS - ( 2019 03:53 )  Alb: 3.0 g/dL / Pro: 4.6 g/dL / ALK PHOS: 71 U/L / ALT: 8 U/L / AST: 22 U/L / GGT: x             Cultures:Culture Results:   >100,000 CFU/ml Enterobacter aerogenes  Susceptibility to follow. ( @ 10:24)      RADIOLOGY & ADDITIONAL STUDIES:

## 2019-11-15 NOTE — PROGRESS NOTE ADULT - PROBLEM SELECTOR PLAN 1
- 11/12 Bedside EGD was notable for old blood in the stomach with mod gastritis and a large gastric body mass and prior hemoclip. No source of bleeding was identified and no evidence of active bleeding. Review of CTA without obvious finding to suggest UGIB. Pt is now pending EUS evaluation of lesion.    - past hx of dieulafoy's lesion, but no active lesions  - 1 episode of melana overnight. Hb stable at 8.8  - Continue pantoprazole 40 mg BID IV  - Trend CBC q6h  - Transfuse if active bleeding or hemoglobin<7  - Maintain active T&S and large bore IV  - Patient will require IVC filter prior to surgery as she is not a candidate for anticoagulation.   -LE dopplers ordered

## 2019-11-15 NOTE — PROGRESS NOTE ADULT - SUBJECTIVE AND OBJECTIVE BOX
Hospital Course:   Pt is 68F with h/o aortic dissection - multiple repairs (20 years ago), GIB - possible Dieulafoy lesion of stomach/duodenum - recent splenic and left gastric arterial embolizations (11/7 at [facility]), spinal hematoma - paraplegia - baclofen pump in place RLQ Abd, nephrolithiasis w/ retained ureteral stent (due for stent exchange w/ urology), recurrent UTIs - known Hx ESBL organisms - requires self straight cath, HTN, PAD, and HSV endophthalmitis/keratitis, left corneal transplant initially presenting w/ AMS, hypotension, now admitted to [facility] for recurrent GI bleed and UTI. Pt found to have hypovolemic/septic shock 2/2 to upper GI bleed and UTI. Pt has since received 3u pRBC and was started meropenem for suspected ESBL UTI. CTA chest/abdomen was performed, aortic-entero fistula was ruled out. 11/13 Bedside endoscope done by GI showed old blood in the stomach with moderate gastritis and a large gastric body mass and prior hemoclip.  No source of bleeding was identified and no evidence of active bleeding. Plan for EUS by GI. Urology also consulted for pt for left ureteral stent. Pt was transferred from CT surgery to MICU for further management. Pt now stable for step down to 7La.     Major events:  10/7 - 10/11: Hospitalized at [place]. EGD, CTA, video capsule, CTA negative. Suspicious for Dieulafoy lesion of stomach/duodenum  10/17 - 10/25: Hospitalized. Repeated endoscopies/imaging - no source.   10/28 - 11/2: Hospitalized, similar presentation. Transferred to [place] for possible gastrectomy.   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 Planned laser lithotripsy & stent exchange 11/7 deferred secondary to transfer to [facility].   11/7 - 11/10: At [place], Patient underwent splenic and left gastric arterial embolizations with vascular surgery, Dr. Rock (11/7). Ongoing melena - another 11/5 - 2 clips placed at site of previous clippings.       OVERNIGHT EVENTS: Pt had one episode on melena. Pt had difficulty sleeping, was given melatonin.     SUBJECTIVE / INTERVAL HPI: Patient seen and examined at bedside. Pt complains of hunger pains, and is upset that her procedure was delayed. She denies fever, chills, chest pain, SOB, abd pain, N/V/D, light headedness, dizziness or any urinary symptoms.    VITAL SIGNS:  Vital Signs Last 24 Hrs  T(C): 36.2 (15 Nov 2019 09:18), Max: 37.2 (14 Nov 2019 20:00)  T(F): 97.1 (15 Nov 2019 09:18), Max: 99 (15 Nov 2019 01:00)  HR: 70 (15 Nov 2019 11:00) (66 - 88)  BP: 136/65 (15 Nov 2019 11:00) (108/56 - 153/69)  BP(mean): 88 (15 Nov 2019 11:00) (71 - 112)  RR: 10 (15 Nov 2019 11:00) (9 - 25)  SpO2: 96% (15 Nov 2019 11:00) (73% - 97%)    PHYSICAL EXAM:    General: paraplegic female resting in bed, NAD  HEENT: NC/AT; Left eye with fixed pupil and keratosis, anicteric sclera; MMM  Neck: supple  Cardiovascular: +S1/S2; RRR, no m/g/r  Respiratory: CTA B/L; no W/R/R  Gastrointestinal: soft, NT/ND; +BSx4  Extremities: WWP; no edema, clubbing or cyanosis  Vascular: 2+ radial, DP/PT pulses B/L  Neurological: AAOx3; pt with 5/5 strength b/l UE and 4/5 LLE, 3/5 strength RLE. Sensations intact in b/l UE and LE.       MEDICATIONS:  MEDICATIONS  (STANDING):  amLODIPine   Tablet 5 milliGRAM(s) Oral daily  artificial  tears Solution 1 Drop(s) Both EYES two times a day  atorvastatin 40 milliGRAM(s) Oral at bedtime  baclofen 20 milliGRAM(s) Oral every 12 hours  chlorhexidine 2% Cloths 1 Application(s) Topical <User Schedule>  DULoxetine 20 milliGRAM(s) Oral every 12 hours  gabapentin 600 milliGRAM(s) Oral every 8 hours  labetalol 100 milliGRAM(s) Oral every 12 hours  meropenem  IVPB 1000 milliGRAM(s) IV Intermittent every 8 hours  pantoprazole  Injectable 40 milliGRAM(s) IV Push two times a day  prednisoLONE acetate 1% Suspension 1 Drop(s) Both EYES four times a day  sodium chloride 0.9% lock flush 3 milliLiter(s) IV Push every 8 hours  sodium chloride 2% Ophthalmic Solution 1 Drop(s) Both EYES daily  valACYclovir 1000 milliGRAM(s) Oral three times a day    MEDICATIONS  (PRN):  acetaminophen   Tablet .. 650 milliGRAM(s) Oral every 6 hours PRN Mild Pain (1 - 3)  melatonin 3 milliGRAM(s) Oral at bedtime PRN Insomnia  oxycodone    5 mG/acetaminophen 325 mG 1 Tablet(s) Oral every 6 hours PRN Moderate Pain (4 - 6)  oxycodone    5 mG/acetaminophen 325 mG 2 Tablet(s) Oral every 6 hours PRN Severe Pain (7 - 10)      ALLERGIES:  Allergies    No Known Allergies    Intolerances        LABS:                        8.8    12.77 )-----------( 123      ( 15 Nov 2019 05:20 )             28.3     11-15    140  |  108  |  6<L>  ----------------------------<  82  4.2   |  24  |  0.35<L>    Ca    8.0<L>      15 Nov 2019 05:20  Phos  2.0     11-15  Mg     1.9     11-15    TPro  4.6<L>  /  Alb  3.0<L>  /  TBili  1.1  /  DBili  x   /  AST  22  /  ALT  8<L>  /  AlkPhos  71  11-14    PT/INR - ( 15 Nov 2019 05:20 )   PT: 12.6 sec;   INR: 1.11          PTT - ( 15 Nov 2019 05:20 )  PTT:34.0 sec    CAPILLARY BLOOD GLUCOSE          RADIOLOGY & ADDITIONAL TESTS: Reviewed.

## 2019-11-15 NOTE — PROGRESS NOTE ADULT - ASSESSMENT
69yo F h/o aortic dissection s/p multiple repairs, spinal hematoma resulting in paraplegia, and embolization of splenic and L gastric arteries for GI bleeding, now admitted for GI bleed.    - F/u EUS  - No vascular surgical intervention indicated at this time  - If she needs any further OR by Gen surg we may consider IVC filter placement preoperatively  - If further OR is not scheduled, repeat dupplex ultrasund of legs b/l and discuss options for IVC again (pt is paraplegic - bedbound with high dvt/pe risk)  - Conntinue SCDs  - Vascular will follow, please call [phone] with any questions 69yo F h/o aortic dissection s/p multiple repairs, spinal hematoma resulting in paraplegia, and embolization of splenic and L gastric arteries for GI bleeding, now admitted for GI bleed.    - F/u EUS - pending for Monday  - No vascular surgical intervention indicated at this time  - Hemoglobin stable, no hematochezia or melena for the past 2 days. No operative intervention per General Surgery at this time.  - Repeat ultrasound duplex of b/l LE for consideration of IVC filter placement  - Plan discussed with chief resident.

## 2019-11-15 NOTE — PROGRESS NOTE ADULT - PROBLEM SELECTOR PLAN 9
F: none  E: Replete electrolytes for K < 4.0 and Mg< 2.0   N: NPO for procedure, resume diet when cleared by GI  DVT: SCDs  GI: Pantoprozole   Lines: R arterial line, Right IJ  Russo placed

## 2019-11-15 NOTE — PROGRESS NOTE ADULT - ASSESSMENT
68F with h/o aortic dissection s/p multiple repairs, spinal hematoma resulting in paraplegia (on baclofen pump), recurrent ESBL UTI, HTN, PAD, and HSV endophthalmitis/keratitis s/p left corneal transplant, multiple GIB w/ multiple endoscopic evaluation and required splenic and left gastric arterial embolization 11/7/19 at [facility] now return to [place] [facility] for AMS transferred to [facility] after found to have hypotension and anemia.    #Melena  Patient with acute drop in hgb following melenic stool with hemodynamic instability.  Bedside EGD was notable for old blood in the stomach with mod gastritis and a large gastric body mass and prior hemoclip.  No source of bleeding was identified and no evidence of active bleeding.  Review of CTA without obvious finding to suggest UGIB.  Now pending consideration of EUS evaluation of gastric lesion.  Last transfusion 11/13/19 and hgb otherwise stable in 8s.    -Continue PPI BID IV  -Trend CBC   -Transfuse per primary team  -Maintain active T&S and large bore IV  -Plan for EUS on Monday, NPO on Sunday MN    Please call on-call GI fellow if patient develops overt GI bleeding or change in hemodynamics    Case d/w svc attending

## 2019-11-15 NOTE — PROGRESS NOTE ADULT - SUBJECTIVE AND OBJECTIVE BOX
Patient was seen and examined at bedside. No acute event overnight. No complaints.    Vital Signs Last 24 Hrs  T(C): 37 (15 Nov 2019 17:39), Max: 37.2 (14 Nov 2019 20:00)  T(F): 98.6 (15 Nov 2019 17:39), Max: 99 (15 Nov 2019 01:00)  HR: 76 (15 Nov 2019 13:32) (66 - 88)  BP: 162/69 (15 Nov 2019 13:32) (108/56 - 162/69)  BP(mean): 99 (15 Nov 2019 13:32) (71 - 112)  RR: 16 (15 Nov 2019 13:32) (9 - 25)  SpO2: 96% (15 Nov 2019 13:32) (73% - 97%)    Physical Exam:  General: NAD  Pulmonary: Nonlabored breathing, no respiratory distress  Cardiovascular: NSR  Abdominal: soft, NT/ND  Extremities: WWP, normal strength, no clubbing/cyanosis/edema  Neuro: A/O x3    Lines/drains/tubes:    I&O's Summary    14 Nov 2019 07:01  -  15 Nov 2019 07:00  --------------------------------------------------------  IN: 370 mL / OUT: 3885 mL / NET: -3515 mL    15 Nov 2019 07:01  -  15 Nov 2019 17:52  --------------------------------------------------------  IN: 0 mL / OUT: 1705 mL / NET: -1705 mL        LABS:                        9.9    12.77 )-----------( 152      ( 15 Nov 2019 14:57 )             30.6     11-15    140  |  108  |  6<L>  ----------------------------<  82  4.2   |  24  |  0.35<L>    Ca    8.0<L>      15 Nov 2019 05:20  Phos  2.0     11-15  Mg     1.9     11-15    TPro  4.6<L>  /  Alb  3.0<L>  /  TBili  1.1  /  DBili  x   /  AST  22  /  ALT  8<L>  /  AlkPhos  71  11-14    PT/INR - ( 15 Nov 2019 05:20 )   PT: 12.6 sec;   INR: 1.11          PTT - ( 15 Nov 2019 05:20 )  PTT:34.0 sec    CAPILLARY BLOOD GLUCOSE        LIVER FUNCTIONS - ( 14 Nov 2019 03:53 )  Alb: 3.0 g/dL / Pro: 4.6 g/dL / ALK PHOS: 71 U/L / ALT: 8 U/L / AST: 22 U/L / GGT: x             RADIOLOGY & ADDITIONAL STUDIES:

## 2019-11-15 NOTE — CONSULT NOTE ADULT - SUBJECTIVE AND OBJECTIVE BOX
68F with h/o aortic dissection - multiple repairs (20 years ago), GIB - Known Dieulafoy lesion of stomach/duodenum - recent splenic and left gastric arterial embolizations (11/7 at [facility]), spinal hematoma - paraplegia. baclofen pump in place RLQ Abd, nephrolithiasis w/retained stent (did not followup for removal and instructed), recurrent UTIs - known Hx ESBL organisms - required straight cath, HTN, PAD, and HSV endophthalmitis/keratitis, left corneal transplant admitted for recurrent GI bleed. Pt presented 10/28 to [facility] ED with hypovolumic/septic shock 2/2 UTI (AMS, Hg 7, LA 8 - given 2 U pRBC), and transferred to [facility]. At [facility], patient received 3u pRBC,  5% albumin 500 and started meropenem for suspected UTI. CTA chest/abdomen perfomed, aortic-entero fistula was ruled out. MICU consulted for potential transfer.     Major events:  10/7 - 10/11: Hospitalized at [place]. EGD, CTA, video capsule, CTA negative. Suspicious for Dieulafoy lesion of stomach/duodenum  10/17 - 10/25: Hospitalized. Repeated endoscopies/imaging - no source.   10/28 - 11/2: Hospitalized, similar presentation. Transferred to [place] for possible gastrectomy.   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 Planned laser lithotripsy & stent exchange 11/7 deferred secondary to transfer to [facility].   11/7 - 11/10: At [place], Patient underwent splenic and left gastric arterial embolizations with vascular surgery, Dr. Rock (11/7). Ongoing melena - another 11/5 - 2 clips placed at site of previous clippings.     addendum: Patient seen and examined bedside. Spoke with  on patient's telephone. Patient had ureteral stent placed for febrile ureteral calculus early August 2019. Patient had plans for definitive stone management in past 3 months but more acute events precluded her from going forth with plan. Denies f/c/urinary complaints. 100.9F on 11/13 and AVSS thereafter. No CVAT. [place] with clear yellow urine. CT within past week reveals no hydronephrosis with hitchcock and stent in place. On meropenem for enterobacter in ucx 11/12. Cr 0.35. Urology closely following. Will allow for GI workup. d/w chief and Dr. Bernardo        Vital Signs Last 24 Hrs  T(C): 36.2 (15 Nov 2019 09:18), Max: 37.2 (14 Nov 2019 20:00)  T(F): 97.1 (15 Nov 2019 09:18), Max: 99 (15 Nov 2019 01:00)  HR: 70 (15 Nov 2019 10:00) (66 - 96)  BP: 120/58 (15 Nov 2019 10:00) (108/56 - 153/69)  BP(mean): 80 (15 Nov 2019 10:00) (71 - 112)  RR: 11 (15 Nov 2019 10:00) (9 - 25)  SpO2: 95% (15 Nov 2019 10:00) (73% - 100%)                          8.8    12.77 )-----------( 123      ( 15 Nov 2019 05:20 )             28.3   15 Nov 2019 05:20    140    |  108    |  6      ----------------------------<  82     4.2     |  24     |  0.35     Ca    8.0        15 Nov 2019 05:20  Phos  2.0       15 Nov 2019 05:20  Mg     1.9       15 Nov 2019 05:20    TPro  4.6    /  Alb  3.0    /  TBili  1.1    /  DBili  x      /  AST  22     /  ALT  8      /  AlkPhos  71     14 Nov 2019 03:53  PT/INR - ( 15 Nov 2019 05:20 )   PT: 12.6 sec;   INR: 1.11          PTT - ( 15 Nov 2019 05:20 )  PTT:34.0 sec

## 2019-11-15 NOTE — PROGRESS NOTE ADULT - ASSESSMENT
68F with h/o aortic dissection - multiple repairs (20 years ago), GIB - Known Dieulafoy lesion of stomach/duodenum - recent splenic and left gastric arterial embolizations (11/7 at [facility]), spinal hematoma - paraplegia. baclofen pump in place RLQ Abd, nephrolithiasis w/retained ureteral stent (did not followup for removal), recurrent UTIs - known Hx ESBL organisms - required straight cath, HTN, PAD, and HSV endophthalmitis/keratitis, left corneal transplant admitted for recurrent GI bleed and UTI.

## 2019-11-15 NOTE — PROGRESS NOTE ADULT - SUBJECTIVE AND OBJECTIVE BOX
Hospital Course:   Pt is 68F with h/o aortic dissection - multiple repairs (20 years ago), GIB - possible Dieulafoy lesion of stomach/duodenum - recent splenic and left gastric arterial embolizations (11/7 at [facility]), spinal hematoma - paraplegia - baclofen pump in place RLQ Abd, nephrolithiasis w/ retained ureteral stent (due for stent exchange w/ urology), recurrent UTIs - known Hx ESBL organisms - requires self straight cath, HTN, PAD, and HSV endophthalmitis/keratitis, left corneal transplant initially presenting w/ AMS, hypotension, now admitted to [facility] for recurrent GI bleed and UTI. Pt found to have hypovolemic/septic shock 2/2 to upper GI bleed and UTI. Pt has since received 3u pRBC and was started meropenem for suspected ESBL UTI. CTA chest/abdomen was performed, aortic-entero fistula was ruled out. 11/13 Bedside endoscope done by GI showed old blood in the stomach with moderate gastritis and a large gastric body mass and prior hemoclip.  No source of bleeding was identified and no evidence of active bleeding. Plan for EUS by GI. Urology also consulted for pt for left ureteral stent. Pt was transferred from CT surgery to MICU for further management. Pt now stable for step down to 7La.     Major events:  10/7 - 10/11: Hospitalized at [place]. EGD, CTA, video capsule, CTA negative. Suspicious for Dieulafoy lesion of stomach/duodenum  10/17 - 10/25: Hospitalized. Repeated endoscopies/imaging - no source.   10/28 - 11/2: Hospitalized, similar presentation. Transferred to [place] for possible gastrectomy.   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 Planned laser lithotripsy & stent exchange 11/7 deferred secondary to transfer to [facility].   11/7 - 11/10: At [place], Patient underwent splenic and left gastric arterial embolizations with vascular surgery, Dr. Rock (11/7). Ongoing melena - another 11/5 - 2 clips placed at site of previous clippings.       OVERNIGHT EVENTS: Pt had one episode on melena. Pt had difficulty sleeping, was given melatonin.     SUBJECTIVE / INTERVAL HPI: Patient seen and examined at bedside. Pt states that she has chronic pain in b/l LE. Denies fever, chills, chest pain, SOB, abd pain, N/V/D, light headedness, dizziness or any urinary symptoms.    VITAL SIGNS:  Vital Signs Last 24 Hrs  T(C): 36.2 (15 Nov 2019 09:18), Max: 37.2 (14 Nov 2019 20:00)  T(F): 97.1 (15 Nov 2019 09:18), Max: 99 (15 Nov 2019 01:00)  HR: 70 (15 Nov 2019 11:00) (66 - 88)  BP: 136/65 (15 Nov 2019 11:00) (108/56 - 153/69)  BP(mean): 88 (15 Nov 2019 11:00) (71 - 112)  RR: 10 (15 Nov 2019 11:00) (9 - 25)  SpO2: 96% (15 Nov 2019 11:00) (73% - 97%)    PHYSICAL EXAM:    General: paraplegic female resting in bed, NAD  HEENT: NC/AT; PERRL, anicteric sclera; MMM  Neck: supple  Cardiovascular: +S1/S2; RRR, no m/g/r  Respiratory: CTA B/L; no W/R/R  Gastrointestinal: soft, NT/ND; +BSx4  Extremities: WWP; no edema, clubbing or cyanosis  Vascular: 2+ radial, DP/PT pulses B/L  Neurological: AAOx3; pt with 5/5 strength b/l UE and 4/5 LLE, 3/5 strength RLE. Sensations intact in b/l UE and LE.       MEDICATIONS:  MEDICATIONS  (STANDING):  amLODIPine   Tablet 5 milliGRAM(s) Oral daily  artificial  tears Solution 1 Drop(s) Both EYES two times a day  atorvastatin 40 milliGRAM(s) Oral at bedtime  baclofen 20 milliGRAM(s) Oral every 12 hours  chlorhexidine 2% Cloths 1 Application(s) Topical <User Schedule>  DULoxetine 20 milliGRAM(s) Oral every 12 hours  gabapentin 600 milliGRAM(s) Oral every 8 hours  labetalol 100 milliGRAM(s) Oral every 12 hours  meropenem  IVPB 1000 milliGRAM(s) IV Intermittent every 8 hours  pantoprazole  Injectable 40 milliGRAM(s) IV Push two times a day  prednisoLONE acetate 1% Suspension 1 Drop(s) Both EYES four times a day  sodium chloride 0.9% lock flush 3 milliLiter(s) IV Push every 8 hours  sodium chloride 2% Ophthalmic Solution 1 Drop(s) Both EYES daily  valACYclovir 1000 milliGRAM(s) Oral three times a day    MEDICATIONS  (PRN):  acetaminophen   Tablet .. 650 milliGRAM(s) Oral every 6 hours PRN Mild Pain (1 - 3)  melatonin 3 milliGRAM(s) Oral at bedtime PRN Insomnia  oxycodone    5 mG/acetaminophen 325 mG 1 Tablet(s) Oral every 6 hours PRN Moderate Pain (4 - 6)  oxycodone    5 mG/acetaminophen 325 mG 2 Tablet(s) Oral every 6 hours PRN Severe Pain (7 - 10)      ALLERGIES:  Allergies    No Known Allergies    Intolerances        LABS:                        8.8    12.77 )-----------( 123      ( 15 Nov 2019 05:20 )             28.3     11-15    140  |  108  |  6<L>  ----------------------------<  82  4.2   |  24  |  0.35<L>    Ca    8.0<L>      15 Nov 2019 05:20  Phos  2.0     11-15  Mg     1.9     11-15    TPro  4.6<L>  /  Alb  3.0<L>  /  TBili  1.1  /  DBili  x   /  AST  22  /  ALT  8<L>  /  AlkPhos  71  11-14    PT/INR - ( 15 Nov 2019 05:20 )   PT: 12.6 sec;   INR: 1.11          PTT - ( 15 Nov 2019 05:20 )  PTT:34.0 sec    CAPILLARY BLOOD GLUCOSE          RADIOLOGY & ADDITIONAL TESTS: Reviewed.

## 2019-11-15 NOTE — PROGRESS NOTE ADULT - ASSESSMENT
68F with h/o aortic dissection - multiple repairs (20 years ago), GIB - Known Dieulafoy lesion of stomach/duodenum - recent splenic and left gastric arterial embolizations (11/7 at [facility]), spinal hematoma - paraplegia. baclofen pump in place RLQ Abd, nephrolithiasis w/retained ureteral stent (did not followup for removal), recurrent UTIs - known Hx ESBL organisms - required straight cath, HTN, PAD, and HSV endophthalmitis/keratitis, left corneal transplant admitted for recurrent GI bleed and UTI.     NEURO  #Functional paraplegia 2/2 complications of aortic dissection  - c/w symptomatic management  - on baclofen 20 mg q12h, duloxetine 20 mg q12h, gabapentin 600 mg q8h  - on oxycodone prn for chronic pain  - pt has baclofen pump, f/u with Deborah from pharmacy for recommendations for management. Risk of withdrawal.     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 No surgical intervention at this time.    - on Labetalol 100 mg q12h, amlodipine 5 mg q24, goal SBP <140    #HLD  - on atorvastatin 40 mg QHS    PULM  #Acute respiratory failure: resolved  -patient was emergently intubated for urgent endoscopy upon admission, successfully extubated on 11/13    GI  #UGIB  - 11/12 Bedside EGD was notable for old blood in the stomach with mod gastritis and a large gastric body mass and prior hemoclip. No source of bleeding was identified and no evidence of active bleeding. Review of CTA without obvious finding to suggest UGIB. Pt is now pending EUS evaluation of lesion.    - past hx of dieulafoy's lesion, but no active lesions  - 1 episode of melana overnight. Hb stable at 8.8  - Continue pantoprazole 40 mg BID IV  - Trend CBC q6h  - Transfuse if active bleeding or hemoglobin<7  - Maintain active T&S and large bore IV    ID  #UTI  - pt straight caths herself. pt w/ hx of esbl organisms  - UCx growing enterobacter aerogenes   - c/w meropenem 11/12 - present  - f/u blood culture  - pt off contact precautions     HEME  #Normocytic Anemia  -likely from UGIB  -plan same as above    RENAL/UROLOGY  #Nephrolithiasis/recurrent UTI  -nephrolithiasis w/retained left ureteral stent (did not followup for removal)  -Planned laser lithotripsy & stent exchange on 11/7 but postponed given HD instability  -urology aware and following    Ophthalmology   #  HSV endophthalmitis/keratitis, left corneal transplant  - on valtrex 1 g TID  - on prednisolone eye drops, 2% sodium chloride eye drops    F: none  E: Replete electrolytes for K < 4.0 and Mg< 2.0   N: clear liquids, NPO at midnight  DVT: SCDs  GI: Pantoprozole   Lines: R arterial line placed, Right IJ  Russo placed 68F with h/o aortic dissection - multiple repairs (20 years ago), GIB - Known Dieulafoy lesion of stomach/duodenum - recent splenic and left gastric arterial embolizations (11/7 at [facility]), spinal hematoma - paraplegia. baclofen pump in place RLQ Abd, nephrolithiasis w/retained ureteral stent (did not followup for removal), recurrent UTIs - known Hx ESBL organisms - required straight cath, HTN, PAD, and HSV endophthalmitis/keratitis, left corneal transplant admitted for recurrent GI bleed and UTI.     NEURO  #Functional paraplegia 2/2 complications of aortic dissection  - c/w symptomatic management  - on baclofen 20 mg q12h, duloxetine 20 mg q12h, gabapentin 600 mg q8h  - on oxycodone prn for chronic pain  - pt has baclofen pump, f/u with Deborah from pharmacy for recommendations for management. Risk of withdrawal.     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 No surgical intervention at this time.    - on Labetalol 100 mg q12h, amlodipine 5 mg q24, goal SBP <140    #HLD  - on atorvastatin 40 mg QHS    Vascular  - pt paraplegic, uses wheelchair  - per vascular, pt may need IVC filter if has surgical procedure. Pt not on any anticoagulation given pt's hx of recurrent GI bleeds.   - F/u b/l LE dopplers    PULM  #Acute respiratory failure: resolved  -patient was emergently intubated for urgent endoscopy upon admission, successfully extubated on 11/13    GI  #UGIB  - 11/12 Bedside EGD was notable for old blood in the stomach with mod gastritis and a large gastric body mass and prior hemoclip. No source of bleeding was identified and no evidence of active bleeding. Review of CTA without obvious finding to suggest UGIB. Pt is now pending EUS evaluation of lesion.    - past hx of dieulafoy's lesion, but no active lesions  - 1 episode of melana overnight. Hb stable at 8.8  - Continue pantoprazole 40 mg BID IV  - Trend CBC q6h  - Transfuse if active bleeding or hemoglobin<7  - Maintain active T&S and large bore IV    ID  #UTI  - pt straight caths herself. pt w/ hx of esbl organisms  - UCx growing enterobacter aerogenes   - c/w meropenem 11/12 - present  - f/u blood culture  - pt off contact precautions     HEME  #Normocytic Anemia  -likely from UGIB  -plan same as above    RENAL/UROLOGY  #Nephrolithiasis/recurrent UTI  -nephrolithiasis w/retained left ureteral stent (did not followup for removal)  -Planned laser lithotripsy & stent exchange on 11/7 but postponed given HD instability  -urology aware and following, can possibly have stent exchange as an outpatient.     Ophthalmology   #  HSV endophthalmitis/keratitis, left corneal transplant  - on valtrex 1 g TID  - on prednisolone eye drops, 2% sodium chloride eye drops    F: none  E: Replete electrolytes for K < 4.0 and Mg< 2.0   N: clear liquids, NPO at midnight  DVT: SCDs  GI: Pantoprozole   Lines: R arterial line placed, Right IJ  Russo placed 68F with h/o aortic dissection - multiple repairs (20 years ago), GIB - Known Dieulafoy lesion of stomach/duodenum - recent splenic and left gastric arterial embolizations (11/7 at [facility]), spinal hematoma - paraplegia. baclofen pump in place RLQ Abd, nephrolithiasis w/retained ureteral stent (did not followup for removal), recurrent UTIs - known Hx ESBL organisms - required straight cath, HTN, PAD, and HSV endophthalmitis/keratitis, left corneal transplant admitted for recurrent GI bleed and UTI. Pt stable for stepdown to [facility].    NEURO  #Functional paraplegia 2/2 complications of aortic dissection  - c/w symptomatic management  - on baclofen 20 mg q12h, duloxetine 20 mg q12h, gabapentin 600 mg q8h  - on oxycodone prn for chronic pain  - pt has baclofen pump, f/u with Deborah from pharmacy for recommendations for management. Risk of withdrawal.     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 No surgical intervention at this time.    - on Labetalol 100 mg q12h, amlodipine 5 mg q24, goal SBP <140    #HLD  - on atorvastatin 40 mg QHS    Vascular  - pt paraplegic, uses wheelchair  - per vascular, pt may need IVC filter if has surgical procedure. Pt not on any anticoagulation given pt's hx of recurrent GI bleeds.   - F/u b/l LE dopplers    PULM  #Acute respiratory failure: resolved  -patient was emergently intubated for urgent endoscopy upon admission, successfully extubated on 11/13    GI  #UGIB  - 11/12 Bedside EGD was notable for old blood in the stomach with mod gastritis and a large gastric body mass and prior hemoclip. No source of bleeding was identified and no evidence of active bleeding. Review of CTA without obvious finding to suggest UGIB. Pt is now pending EUS evaluation of lesion.    - past hx of dieulafoy's lesion, but no active lesions  - 1 episode of melana overnight. Hb stable at 8.8  - Continue pantoprazole 40 mg BID IV  - Trend CBC q6h  - Transfuse if active bleeding or hemoglobin<7  - Maintain active T&S and large bore IV    ID  #UTI  - pt straight caths herself. pt w/ hx of esbl organisms  - UCx growing enterobacter aerogenes   - c/w meropenem 11/12 - present  - f/u blood culture  - pt off contact precautions     HEME  #Normocytic Anemia  -likely from UGIB  -plan same as above    RENAL/UROLOGY  #Nephrolithiasis/recurrent UTI  -nephrolithiasis w/retained left ureteral stent (did not followup for removal)  -Planned laser lithotripsy & stent exchange on 11/7 but postponed given HD instability  -urology aware and following, can possibly have stent exchange as an outpatient.     Ophthalmology   #  HSV endophthalmitis/keratitis, left corneal transplant  - on valtrex 1 g TID  - on prednisolone eye drops, 2% sodium chloride eye drops    F: none  E: Replete electrolytes for K < 4.0 and Mg< 2.0   N: clear liquids, NPO at midnight  DVT: SCDs  GI: Pantoprozole   Lines: R arterial line placed, Right IJ  Russo placed

## 2019-11-15 NOTE — PROGRESS NOTE ADULT - PROBLEM SELECTOR PLAN 2
- pt straight caths herself. pt w/ hx of esbl organisms  - UCx growing enterobacter aerogenes   - c/w meropenem 11/12 - present  - f/u blood culture  - pt off contact precautions    #Nephrolithiasis/recurrent UTI  -nephrolithiasis w/retained left ureteral stent (did not followup for removal)  -Planned laser lithotripsy & stent exchange on 11/7 but postponed given HD instability  -urology aware and following

## 2019-11-15 NOTE — PROGRESS NOTE ADULT - SUBJECTIVE AND OBJECTIVE BOX
Pt seen and examined at bedside.  Patient reports no acute event overnight and no new complaint.  Denies f/c, cp, sob.  Not aware of BM.    Allergies    No Known Allergies    Intolerances      MEDICATIONS:  MEDICATIONS  (STANDING):  amLODIPine   Tablet 5 milliGRAM(s) Oral daily  artificial  tears Solution 1 Drop(s) Both EYES two times a day  atorvastatin 40 milliGRAM(s) Oral at bedtime  baclofen 20 milliGRAM(s) Oral every 12 hours  Baclofen 480 MICROgram(s)/Day,Morphine 2.4 mG/Day 480 MICROGram(s) IntraThecal Continuous Pump  chlorhexidine 2% Cloths 1 Application(s) Topical daily  DULoxetine 20 milliGRAM(s) Oral every 12 hours  gabapentin 600 milliGRAM(s) Oral every 8 hours  labetalol 100 milliGRAM(s) Oral every 12 hours  meropenem  IVPB 1000 milliGRAM(s) IV Intermittent every 8 hours  pantoprazole  Injectable 40 milliGRAM(s) IV Push two times a day  prednisoLONE acetate 1% Suspension 1 Drop(s) Both EYES four times a day  sodium chloride 0.9% lock flush 3 milliLiter(s) IV Push every 8 hours  sodium chloride 2% Ophthalmic Solution 1 Drop(s) Both EYES daily  valACYclovir 1000 milliGRAM(s) Oral three times a day    MEDICATIONS  (PRN):  acetaminophen   Tablet .. 650 milliGRAM(s) Oral every 6 hours PRN Mild Pain (1 - 3)  melatonin 3 milliGRAM(s) Oral at bedtime PRN Insomnia  oxycodone    5 mG/acetaminophen 325 mG 1 Tablet(s) Oral every 6 hours PRN Moderate Pain (4 - 6)  oxycodone    5 mG/acetaminophen 325 mG 2 Tablet(s) Oral every 6 hours PRN Severe Pain (7 - 10)    Vital Signs Last 24 Hrs  T(C): 37 (15 Nov 2019 17:39), Max: 37.2 (14 Nov 2019 20:00)  T(F): 98.6 (15 Nov 2019 17:39), Max: 99 (15 Nov 2019 01:00)  HR: 83 (15 Nov 2019 18:33) (66 - 88)  BP: 143/67 (15 Nov 2019 18:33) (108/56 - 162/69)  BP(mean): 97 (15 Nov 2019 18:33) (71 - 112)  RR: 14 (15 Nov 2019 18:33) (9 - 25)  SpO2: 94% (15 Nov 2019 18:33) (73% - 97%)    11-14 @ 07:01  -  11-15 @ 07:00  --------------------------------------------------------  IN: 370 mL / OUT: 3885 mL / NET: -3515 mL    11-15 @ 07:01  -  11-15 @ 18:55  --------------------------------------------------------  IN: 0 mL / OUT: 1705 mL / NET: -1705 mL      PHYSICAL EXAM:    General: Thin elderly female, pallor, in nad  HEENT: MMM, conjunctiva and sclera clear  Gastrointestinal: Soft non-tender non-distended; Normal bowel sounds; No rebound or guarding  Skin: Warm and dry  Neuro: AAOx3    LABS:                        9.9    12.77 )-----------( 152      ( 15 Nov 2019 14:57 )             30.6     11-15    140  |  108  |  6<L>  ----------------------------<  82  4.2   |  24  |  0.35<L>    Ca    8.0<L>      15 Nov 2019 05:20  Phos  2.0     11-15  Mg     1.9     11-15    TPro  4.6<L>  /  Alb  3.0<L>  /  TBili  1.1  /  DBili  x   /  AST  22  /  ALT  8<L>  /  AlkPhos  71  11-14    PT/INR - ( 15 Nov 2019 05:20 )   PT: 12.6 sec;   INR: 1.11          PTT - ( 15 Nov 2019 05:20 )  PTT:34.0 sec    Culture - Blood (collected 14 Nov 2019 16:41)  Source: .Blood Blood-Peripheral  Preliminary Report (15 Nov 2019 17:01):    No growth at 1 day.    Culture - Blood (collected 14 Nov 2019 16:41)  Source: .Blood Blood-Peripheral  Preliminary Report (15 Nov 2019 17:01):    No growth at 1 day.

## 2019-11-15 NOTE — PROGRESS NOTE ADULT - ASSESSMENT
67yo F h/o aortic dissection s/p multiple repairs, spinal hematoma resulting in paraplegia, and embolization of splenic and L gastric arteries for GI bleeding, now admitted for GI bleed.  Intubated for airway protection    EGD with GI showing large amount of old blood in stomach but no active bleeding, presence of submucosal mass non bleeding.   CTA - no active bleeding  Pending EUS as per GI    Plan:   - No acute surgical intervention  - Continue management per CTS, GI and vascular   - Trend CBC  - Maintain 2 large bore IV  - Active type and screen  - Surgery team 2c will follow   - Discussed with attending

## 2019-11-15 NOTE — PROGRESS NOTE ADULT - SUBJECTIVE AND OBJECTIVE BOX
Pt seen and examined at bedside, no acute event overnight, haemodynamically stable. Denies fever, chills, nausea, vomiting or diarrhoea. Denies Abd pain. One episode of black tarry stool.     MEDICATIONS  (STANDING):  amLODIPine   Tablet 5 milliGRAM(s) Oral daily  artificial  tears Solution 1 Drop(s) Both EYES two times a day  atorvastatin 40 milliGRAM(s) Oral at bedtime  baclofen 20 milliGRAM(s) Oral every 12 hours  chlorhexidine 2% Cloths 1 Application(s) Topical <User Schedule>  DULoxetine 20 milliGRAM(s) Oral every 12 hours  gabapentin 600 milliGRAM(s) Oral every 8 hours  labetalol 100 milliGRAM(s) Oral every 12 hours  meropenem  IVPB 1000 milliGRAM(s) IV Intermittent every 8 hours  pantoprazole  Injectable 40 milliGRAM(s) IV Push two times a day  prednisoLONE acetate 1% Suspension 1 Drop(s) Both EYES four times a day  sodium chloride 0.9% lock flush 3 milliLiter(s) IV Push every 8 hours  sodium chloride 2% Ophthalmic Solution 1 Drop(s) Both EYES daily  valACYclovir 1000 milliGRAM(s) Oral three times a day    MEDICATIONS  (PRN):  acetaminophen   Tablet .. 650 milliGRAM(s) Oral every 6 hours PRN Mild Pain (1 - 3)  melatonin 3 milliGRAM(s) Oral at bedtime PRN Insomnia  oxycodone    5 mG/acetaminophen 325 mG 1 Tablet(s) Oral every 6 hours PRN Moderate Pain (4 - 6)  oxycodone    5 mG/acetaminophen 325 mG 2 Tablet(s) Oral every 6 hours PRN Severe Pain (7 - 10)      Russo:	  [ ] None	[ ] Daily Russo Order Placed	   Indication:	  [ ] Strict I and O's    [ ] Obstruction     [ ] Incontinence + Stage 3 or 4 Decubitus  Central Line:  [ ] None	   [ ]  Medication / TPN Administration     Drips:     ICU Vital Signs Last 24 Hrs  T(C): 37.2 (15 Nov 2019 01:00), Max: 37.2 (14 Nov 2019 20:00)  T(F): 99 (15 Nov 2019 01:00), Max: 99 (15 Nov 2019 01:00)  HR: 66 (15 Nov 2019 07:00) (66 - 96)  BP: 113/77 (15 Nov 2019 07:00) (108/56 - 153/69)  BP(mean): 95 (15 Nov 2019 07:00) (71 - 112)  ABP: 118/50 (15 Nov 2019 07:00) (116/50 - 174/72)  ABP(mean): 78 (15 Nov 2019 07:00) (76 - 258)  RR: 11 (15 Nov 2019 07:00) (10 - 30)  SpO2: 93% (15 Nov 2019 07:00) (73% - 100%)      Physical Exam:  General: NAD  HEENT: NC/AT, EOMI, PERRLA, normal hearing, no oral lesions, neck supple w/o LAD  Pulmonary: Nonlabored breathing, no respiratory distress, CTA-B  Cardiovascular: NSR, no murmurs  Abdominal: soft, NT/ND, +BS, no organomegaly  Extremities: WWP, 5/5 strength x 4, no clubbing/cyanosis/edema  Neuro: A/O x3, CNs II-XII grossly intact, normal motor/sensation, no focal deficits        I&O's Detail    14 Nov 2019 07:01  -  15 Nov 2019 07:00  --------------------------------------------------------  IN:    IV PiggyBack: 50 mL    Oral Fluid: 320 mL  Total IN: 370 mL    OUT:    Indwelling Catheter - Urethral: 3885 mL  Total OUT: 3885 mL    Total NET: -3515 mL                            8.8    12.77 )-----------( 123      ( 15 Nov 2019 05:20 )             28.3   11-15    140  |  108  |  6<L>  ----------------------------<  82  4.2   |  24  |  0.35<L>    Ca    8.0<L>      15 Nov 2019 05:20  Phos  2.0     11-15  Mg     1.9     11-15    TPro  4.6<L>  /  Alb  3.0<L>  /  TBili  1.1  /  DBili  x   /  AST  22  /  ALT  8<L>  /  AlkPhos  71  11-14

## 2019-11-16 NOTE — PROGRESS NOTE ADULT - ASSESSMENT
68F with h/o aortic dissection s/p multiple repairs, spinal hematoma resulting in paraplegia (on baclofen pump), recurrent ESBL UTI, HTN, PAD, and HSV endophthalmitis/keratitis s/p left corneal transplant, multiple GIB w/ multiple endoscopic evaluation and required splenic and left gastric arterial embolization 11/7/19 at [facility] now return to [place] [facility] for AMS transferred to [facility] after found to have hypotension and anemia.    #Melena  Initially pt presented with acute drop in hgb following melenic stool with hemodynamic instability.  Bedside EGD was notable for old blood in the stomach with mod gastritis and a large gastric body mass and prior hemoclip.  No source of bleeding was identified and no evidence of active bleeding.  Review of CTA without obvious finding to suggest UGIB.  Now pending consideration of EUS evaluation of gastric lesion.  Last transfusion 11/13/19 and hgb otherwise stable. no further signs of bleeding  -Continue PPI BID IV  -Trend CBC   -Transfuse per primary team  -Maintain active T&S and large bore IV  -Plan for EUS on Monday, NPO on Sunday MN      Case d/w svc attending

## 2019-11-16 NOTE — PROGRESS NOTE ADULT - ASSESSMENT
68F with h/o aortic dissection - multiple repairs (20 years ago), GIB - Known Dieulafoy lesion of stomach/duodenum - recent splenic and left gastric arterial embolizations (11/7 at [facility]), spinal hematoma - paraplegia. baclofen pump in place RLQ Abd, nephrolithiasis w/retained ureteral stent (did not followup for removal), recurrent UTIs - known Hx ESBL organisms - required straight cath, HTN, PAD, and HSV endophthalmitis/keratitis, left corneal transplant admitted for recurrent GI bleed and UTI.

## 2019-11-16 NOTE — PROGRESS NOTE ADULT - SUBJECTIVE AND OBJECTIVE BOX
Pt seen and examined at bedside. Family at [facility]  Denies abd pain, nausea, vomiting, melena, hematochezia    PERTINENT REVIEW OF SYSTEMS:  CONSTITUTIONAL: No weakness, fevers or chills  HEENT: No visual changes; No vertigo or throat pain   GASTROINTESTINAL: No abdominal or epigastric pain. No nausea, vomiting, or hematemesis; No diarrhea or constipation. No melena or hematochezia.  NEUROLOGICAL: No numbness or weakness  SKIN: No itching, burning, rashes, or lesions     Allergies    No Known Allergies    Intolerances      MEDICATIONS:  MEDICATIONS  (STANDING):  amLODIPine   Tablet 5 milliGRAM(s) Oral daily  artificial  tears Solution 1 Drop(s) Both EYES two times a day  atorvastatin 40 milliGRAM(s) Oral at bedtime  baclofen 20 milliGRAM(s) Oral every 12 hours  Baclofen 480 MICROgram(s)/Day,Morphine 2.4 mG/Day 480 MICROGram(s) IntraThecal Continuous Pump  chlorhexidine 2% Cloths 1 Application(s) Topical daily  DULoxetine 20 milliGRAM(s) Oral every 12 hours  gabapentin 600 milliGRAM(s) Oral every 8 hours  labetalol 100 milliGRAM(s) Oral every 12 hours  meropenem  IVPB 1000 milliGRAM(s) IV Intermittent every 8 hours  pantoprazole  Injectable 40 milliGRAM(s) IV Push two times a day  prednisoLONE acetate 1% Suspension 1 Drop(s) Both EYES four times a day  sodium chloride 0.9% lock flush 3 milliLiter(s) IV Push every 8 hours  sodium chloride 2% Ophthalmic Solution 1 Drop(s) Both EYES daily  valACYclovir 1000 milliGRAM(s) Oral three times a day    MEDICATIONS  (PRN):  acetaminophen   Tablet .. 650 milliGRAM(s) Oral every 6 hours PRN Mild Pain (1 - 3)  melatonin 3 milliGRAM(s) Oral at bedtime PRN Insomnia  oxycodone    5 mG/acetaminophen 325 mG 1 Tablet(s) Oral every 6 hours PRN Moderate Pain (4 - 6)  oxycodone    5 mG/acetaminophen 325 mG 2 Tablet(s) Oral every 6 hours PRN Severe Pain (7 - 10)    Vital Signs Last 24 Hrs  T(C): 36.4 (16 Nov 2019 17:20), Max: 37 (16 Nov 2019 14:00)  T(F): 97.5 (16 Nov 2019 17:20), Max: 98.6 (16 Nov 2019 14:00)  HR: 82 (16 Nov 2019 17:32) (68 - 84)  BP: 114/56 (16 Nov 2019 17:32) (102/64 - 144/67)  BP(mean): 79 (16 Nov 2019 17:32) (78 - 96)  RR: 19 (16 Nov 2019 17:32) (11 - 22)  SpO2: 96% (16 Nov 2019 17:32) (93% - 96%)    11-15 @ 07:01  -  11-16 @ 07:00  --------------------------------------------------------  IN: 0 mL / OUT: 3055 mL / NET: -3055 mL    11-16 @ 07:01  -  11-16 @ 18:56  --------------------------------------------------------  IN: 0 mL / OUT: 420 mL / NET: -420 mL      PHYSICAL EXAM:    General: Well developed; well nourished; in no acute distress  HEENT: MMM, conjunctiva and sclera clear  Gastrointestinal: Soft non-tender non-distended; Normal bowel sounds; No hepatosplenomegaly. No rebound or guarding  Skin: Warm and dry. No obvious rash    LABS:                        9.3    11.06 )-----------( 171      ( 16 Nov 2019 05:40 )             29.4     11-16    141  |  105  |  7   ----------------------------<  102<H>  3.8   |  29  |  0.45<L>    Ca    8.4      16 Nov 2019 05:40  Phos  2.2     11-16  Mg     2.0     11-16      PT/INR - ( 15 Nov 2019 05:20 )   PT: 12.6 sec;   INR: 1.11          PTT - ( 15 Nov 2019 05:20 )  PTT:34.0 sec                  Culture - Blood (collected 14 Nov 2019 16:41)  Source: .Blood Blood-Peripheral  Preliminary Report (16 Nov 2019 17:00):    No growth at 2 days.    Culture - Blood (collected 14 Nov 2019 16:41)  Source: .Blood Blood-Peripheral  Preliminary Report (16 Nov 2019 17:00):    No growth at 2 days.      RADIOLOGY & ADDITIONAL STUDIES:

## 2019-11-16 NOTE — PROGRESS NOTE ADULT - PROBLEM SELECTOR PLAN 2
- pt straight caths herself. pt w/ hx of esbl organisms  - UCx growing enterobacter aerogenes   - c/w meropenem 11/12 - present  - f/u blood culture  - pt off contact precautions    #Nephrolithiasis/recurrent UTI  -nephrolithiasis w/retained left ureteral stent (did not followup for removal)  -Planned laser lithotripsy & stent exchange on 11/7 but postponed given HD instability  -urology aware and following - pt straight caths herself. pt w/ hx of esbl organisms  - UCx growing enterobacter aerogenes   - f/u blood culture  - pt off contact precautions  - abx changed from meropenem to cefepime (stop date 11/19/19)     #Nephrolithiasis/recurrent UTI  -nephrolithiasis w/retained left ureteral stent (did not followup for removal)  -Planned laser lithotripsy & stent exchange on 11/7 but postponed given HD instability  -urology aware and following

## 2019-11-16 NOTE — PROGRESS NOTE ADULT - ASSESSMENT
69yo F h/o aortic dissection s/p multiple repairs, spinal hematoma resulting in paraplegia, and embolization of splenic and L gastric arteries for GI bleeding, now admitted for GI bleed.  Intubated for airway protection    EGD with GI showing large amount of old blood in stomach but no active bleeding, presence of submucosal mass non bleeding.   CTA - no active bleeding    Hgb stable, HDS, clinically improving    Plan:   - No acute surgical intervention at this time  - Continue management per CTS, GI and vascular   - Trend CBC  - Maintain 2 large bore IV  - Active type and screen  - Surgery team 2c will follow

## 2019-11-16 NOTE — PROGRESS NOTE ADULT - ASSESSMENT
69yo F h/o aortic dissection s/p multiple repairs, spinal hematoma resulting in paraplegia, and embolization of splenic and L gastric arteries for GI bleeding, now admitted for GI bleed.    - F/u EUS - pending for Monday  - No vascular surgical intervention indicated at this time  - Pt fairly stable with nno new symptoms (systemic or GI specific)  - Repeat ultrasound duplex of b/l LE on Monday  - Call vascular service for any questions

## 2019-11-16 NOTE — PROGRESS NOTE ADULT - SUBJECTIVE AND OBJECTIVE BOX
INTERVAL HPI/OVERNIGHT EVENTS:    SUBJECTIVE: Patient seen and examined at bedside.    VITAL SIGNS:  ICU Vital Signs Last 24 Hrs  T(C): 36.3 (16 Nov 2019 06:00), Max: 37 (15 Nov 2019 17:39)  T(F): 97.3 (16 Nov 2019 06:00), Max: 98.6 (15 Nov 2019 17:39)  HR: 78 (16 Nov 2019 05:10) (66 - 83)  BP: 144/67 (16 Nov 2019 05:10) (113/77 - 162/69)  BP(mean): 96 (16 Nov 2019 05:10) (80 - 102)  ABP: 152/54 (15 Nov 2019 11:00) (118/50 - 152/54)  ABP(mean): 94 (15 Nov 2019 11:00) (78 - 94)  RR: 15 (16 Nov 2019 05:10) (9 - 16)  SpO2: 93% (16 Nov 2019 05:10) (93% - 96%)        11-14 @ 07:01  -  11-15 @ 07:00  --------------------------------------------------------  IN: 370 mL / OUT: 3885 mL / NET: -3515 mL    11-15 @ 07:01  -  11-16 @ 06:15  --------------------------------------------------------  IN: 0 mL / OUT: 1705 mL / NET: -1705 mL      CAPILLARY BLOOD GLUCOSE          PHYSICAL EXAM:    Constitutional: NAD  HEENT: NC/AT; PERRL, anicteric sclera; MMM  Neck: supple, no JVD  Cardiovascular: +S1/S2, RRR  Respiratory: CTA B/L, no W/R/R  Gastrointestinal: abdomen soft, NT/ND; no rebound or guarding; +BSx4  Genitourinary: no suprapubic tenderness or fullness  Extremities: WWP; no LE edema; no clubbing or cyanosis  Vascular: 2+ radial, DP/PT and femoral pulses B/L  Dermatologic: normal color and turgor; no visible rashes  Neurological:     MEDICATIONS:  MEDICATIONS  (STANDING):  amLODIPine   Tablet 5 milliGRAM(s) Oral daily  artificial  tears Solution 1 Drop(s) Both EYES two times a day  atorvastatin 40 milliGRAM(s) Oral at bedtime  baclofen 20 milliGRAM(s) Oral every 12 hours  Baclofen 480 MICROgram(s)/Day,Morphine 2.4 mG/Day 480 MICROGram(s) IntraThecal Continuous Pump  chlorhexidine 2% Cloths 1 Application(s) Topical daily  DULoxetine 20 milliGRAM(s) Oral every 12 hours  gabapentin 600 milliGRAM(s) Oral every 8 hours  labetalol 100 milliGRAM(s) Oral every 12 hours  meropenem  IVPB 1000 milliGRAM(s) IV Intermittent every 8 hours  pantoprazole  Injectable 40 milliGRAM(s) IV Push two times a day  prednisoLONE acetate 1% Suspension 1 Drop(s) Both EYES four times a day  sodium chloride 0.9% lock flush 3 milliLiter(s) IV Push every 8 hours  sodium chloride 2% Ophthalmic Solution 1 Drop(s) Both EYES daily  valACYclovir 1000 milliGRAM(s) Oral three times a day    MEDICATIONS  (PRN):  acetaminophen   Tablet .. 650 milliGRAM(s) Oral every 6 hours PRN Mild Pain (1 - 3)  melatonin 3 milliGRAM(s) Oral at bedtime PRN Insomnia  oxycodone    5 mG/acetaminophen 325 mG 1 Tablet(s) Oral every 6 hours PRN Moderate Pain (4 - 6)  oxycodone    5 mG/acetaminophen 325 mG 2 Tablet(s) Oral every 6 hours PRN Severe Pain (7 - 10)      ALLERGIES:  Allergies    No Known Allergies    Intolerances        LABS:                        9.3    11.06 )-----------( 171      ( 16 Nov 2019 05:40 )             29.4     11-15    140  |  108  |  6<L>  ----------------------------<  82  4.2   |  24  |  0.35<L>    Ca    8.0<L>      15 Nov 2019 05:20  Phos  2.0     11-15  Mg     1.9     11-15      PT/INR - ( 15 Nov 2019 05:20 )   PT: 12.6 sec;   INR: 1.11          PTT - ( 15 Nov 2019 05:20 )  PTT:34.0 sec      RADIOLOGY & ADDITIONAL TESTS: Reviewed.    ASSESSMENT:    PLAN:    PULMONARY    NEURO    CARDIOVASCULAR    RENAL    ID    GI/FEN    DVT PPx -     LINES -     CODE -     DISPO - continue intensive level of care in CCM/MICU service INTERVAL HPI/OVERNIGHT EVENTS:    SUBJECTIVE: Patient seen and examined at bedside.    VITAL SIGNS:  ICU Vital Signs Last 24 Hrs  T(C): 36.3 (16 Nov 2019 06:00), Max: 37 (15 Nov 2019 17:39)  T(F): 97.3 (16 Nov 2019 06:00), Max: 98.6 (15 Nov 2019 17:39)  HR: 78 (16 Nov 2019 05:10) (66 - 83)  BP: 144/67 (16 Nov 2019 05:10) (113/77 - 162/69)  BP(mean): 96 (16 Nov 2019 05:10) (80 - 102)  ABP: 152/54 (15 Nov 2019 11:00) (118/50 - 152/54)  ABP(mean): 94 (15 Nov 2019 11:00) (78 - 94)  RR: 15 (16 Nov 2019 05:10) (9 - 16)  SpO2: 93% (16 Nov 2019 05:10) (93% - 96%)        11-14 @ 07:01  -  11-15 @ 07:00  --------------------------------------------------------  IN: 370 mL / OUT: 3885 mL / NET: -3515 mL    11-15 @ 07:01  -  11-16 @ 06:15  --------------------------------------------------------  IN: 0 mL / OUT: 1705 mL / NET: -1705 mL      CAPILLARY BLOOD GLUCOSE          PHYSICAL EXAM:    Constitutional: NAD, paraplegic   HEENT: NC/AT; L eye fixed pupil, keratosis, MMM  Neck: supple  Cardiovascular: +S1/S2, RRR  Respiratory: CTA B/L, no W/R/R  Gastrointestinal: abdomen soft, NT/ND; no rebound or guarding; +BSx4, baclofen pump in place on R side  Extremities: WWP; no LE edema; no clubbing or cyanosis  Vascular: 2+ radial, DP/PT and pulses B/L  Dermatologic: normal color and turgor; no visible rashes  Neurological: AAOx3; pt with 5/5 strength b/l UE and 4/5 LLE, 3/5 strength RLE. Sensations intact in b/l UE and LE    MEDICATIONS:  MEDICATIONS  (STANDING):  amLODIPine   Tablet 5 milliGRAM(s) Oral daily  artificial  tears Solution 1 Drop(s) Both EYES two times a day  atorvastatin 40 milliGRAM(s) Oral at bedtime  baclofen 20 milliGRAM(s) Oral every 12 hours  Baclofen 480 MICROgram(s)/Day,Morphine 2.4 mG/Day 480 MICROGram(s) IntraThecal Continuous Pump  chlorhexidine 2% Cloths 1 Application(s) Topical daily  DULoxetine 20 milliGRAM(s) Oral every 12 hours  gabapentin 600 milliGRAM(s) Oral every 8 hours  labetalol 100 milliGRAM(s) Oral every 12 hours  meropenem  IVPB 1000 milliGRAM(s) IV Intermittent every 8 hours  pantoprazole  Injectable 40 milliGRAM(s) IV Push two times a day  prednisoLONE acetate 1% Suspension 1 Drop(s) Both EYES four times a day  sodium chloride 0.9% lock flush 3 milliLiter(s) IV Push every 8 hours  sodium chloride 2% Ophthalmic Solution 1 Drop(s) Both EYES daily  valACYclovir 1000 milliGRAM(s) Oral three times a day    MEDICATIONS  (PRN):  acetaminophen   Tablet .. 650 milliGRAM(s) Oral every 6 hours PRN Mild Pain (1 - 3)  melatonin 3 milliGRAM(s) Oral at bedtime PRN Insomnia  oxycodone    5 mG/acetaminophen 325 mG 1 Tablet(s) Oral every 6 hours PRN Moderate Pain (4 - 6)  oxycodone    5 mG/acetaminophen 325 mG 2 Tablet(s) Oral every 6 hours PRN Severe Pain (7 - 10)      ALLERGIES:  Allergies    No Known Allergies    Intolerances        LABS:                        9.3    11.06 )-----------( 171      ( 16 Nov 2019 05:40 )             29.4     11-15    140  |  108  |  6<L>  ----------------------------<  82  4.2   |  24  |  0.35<L>    Ca    8.0<L>      15 Nov 2019 05:20  Phos  2.0     11-15  Mg     1.9     11-15      PT/INR - ( 15 Nov 2019 05:20 )   PT: 12.6 sec;   INR: 1.11          PTT - ( 15 Nov 2019 05:20 )  PTT:34.0 sec      RADIOLOGY & ADDITIONAL TESTS: Reviewed. INTERVAL HPI/OVERNIGHT EVENTS: Hemoglobin has been stable. Her BP was 144 systolic prior to meds, and<140 after BP meds, so no intervention was necessary    SUBJECTIVE: Patient seen and examined at bedside. No other complaints besides back pain.     VITAL SIGNS:  ICU Vital Signs Last 24 Hrs  T(C): 36.3 (16 Nov 2019 06:00), Max: 37 (15 Nov 2019 17:39)  T(F): 97.3 (16 Nov 2019 06:00), Max: 98.6 (15 Nov 2019 17:39)  HR: 78 (16 Nov 2019 05:10) (66 - 83)  BP: 144/67 (16 Nov 2019 05:10) (113/77 - 162/69)  BP(mean): 96 (16 Nov 2019 05:10) (80 - 102)  ABP: 152/54 (15 Nov 2019 11:00) (118/50 - 152/54)  ABP(mean): 94 (15 Nov 2019 11:00) (78 - 94)  RR: 15 (16 Nov 2019 05:10) (9 - 16)  SpO2: 93% (16 Nov 2019 05:10) (93% - 96%)        11-14 @ 07:01  -  11-15 @ 07:00  --------------------------------------------------------  IN: 370 mL / OUT: 3885 mL / NET: -3515 mL    11-15 @ 07:01  -  11-16 @ 06:15  --------------------------------------------------------  IN: 0 mL / OUT: 1705 mL / NET: -1705 mL      CAPILLARY BLOOD GLUCOSE          PHYSICAL EXAM:    Constitutional: NAD, paraplegic   HEENT: NC/AT; L eye fixed pupil, keratosis, MMM  Neck: supple  Cardiovascular: +S1/S2, RRR  Respiratory: CTA B/L, no W/R/R  Gastrointestinal: abdomen soft, NT/ND; no rebound or guarding; +BSx4, baclofen pump in place on R side  Genitourinary: hitchcock in place draining urine  Extremities: WWP; no LE edema; no clubbing or cyanosis  Vascular: 2+ radial, DP/PT and pulses B/L  Dermatologic: normal color and turgor; no visible rashes  Neurological: AAOx3; pt with 5/5 strength b/l UE and 4/5 LLE, 3/5 strength RLE. Sensations intact in b/l UE and LE    MEDICATIONS:  MEDICATIONS  (STANDING):  amLODIPine   Tablet 5 milliGRAM(s) Oral daily  artificial  tears Solution 1 Drop(s) Both EYES two times a day  atorvastatin 40 milliGRAM(s) Oral at bedtime  baclofen 20 milliGRAM(s) Oral every 12 hours  Baclofen 480 MICROgram(s)/Day,Morphine 2.4 mG/Day 480 MICROGram(s) IntraThecal Continuous Pump  chlorhexidine 2% Cloths 1 Application(s) Topical daily  DULoxetine 20 milliGRAM(s) Oral every 12 hours  gabapentin 600 milliGRAM(s) Oral every 8 hours  labetalol 100 milliGRAM(s) Oral every 12 hours  meropenem  IVPB 1000 milliGRAM(s) IV Intermittent every 8 hours  pantoprazole  Injectable 40 milliGRAM(s) IV Push two times a day  prednisoLONE acetate 1% Suspension 1 Drop(s) Both EYES four times a day  sodium chloride 0.9% lock flush 3 milliLiter(s) IV Push every 8 hours  sodium chloride 2% Ophthalmic Solution 1 Drop(s) Both EYES daily  valACYclovir 1000 milliGRAM(s) Oral three times a day    MEDICATIONS  (PRN):  acetaminophen   Tablet .. 650 milliGRAM(s) Oral every 6 hours PRN Mild Pain (1 - 3)  melatonin 3 milliGRAM(s) Oral at bedtime PRN Insomnia  oxycodone    5 mG/acetaminophen 325 mG 1 Tablet(s) Oral every 6 hours PRN Moderate Pain (4 - 6)  oxycodone    5 mG/acetaminophen 325 mG 2 Tablet(s) Oral every 6 hours PRN Severe Pain (7 - 10)      ALLERGIES:  Allergies    No Known Allergies    Intolerances        LABS:                        9.3    11.06 )-----------( 171      ( 16 Nov 2019 05:40 )             29.4     11-15    140  |  108  |  6<L>  ----------------------------<  82  4.2   |  24  |  0.35<L>    Ca    8.0<L>      15 Nov 2019 05:20  Phos  2.0     11-15  Mg     1.9     11-15      PT/INR - ( 15 Nov 2019 05:20 )   PT: 12.6 sec;   INR: 1.11          PTT - ( 15 Nov 2019 05:20 )  PTT:34.0 sec      RADIOLOGY & ADDITIONAL TESTS: Reviewed.

## 2019-11-16 NOTE — PROGRESS NOTE ADULT - SUBJECTIVE AND OBJECTIVE BOX
SUBJECTIVE: Patient seen and examined at bedside this afternoon. Patient resting comfortably although reports some mild back pain. No dizziness, lightheadedness. Reports no melena or hematochezia.        Vital Signs Last 24 Hrs  T(C): 36.4 (16 Nov 2019 17:20), Max: 37 (16 Nov 2019 14:00)  T(F): 97.5 (16 Nov 2019 17:20), Max: 98.6 (16 Nov 2019 14:00)  HR: 82 (16 Nov 2019 17:32) (68 - 84)  BP: 114/56 (16 Nov 2019 17:32) (102/64 - 144/67)  BP(mean): 79 (16 Nov 2019 17:32) (78 - 96)  RR: 19 (16 Nov 2019 17:32) (11 - 22)  SpO2: 96% (16 Nov 2019 17:32) (93% - 96%)    Physical Exam:  General: NAD  Pulmonary: Nonlabored breathing, no respiratory distress  Abdominal: soft, nondistended, nontender with no rebound or guarding  Extremities: WWP, normal strength, no clubbing/cyanosis/edema  Neuro: A/O x3    Lines/drains/tubes:    I&O's Summary    15 Nov 2019 07:01  -  16 Nov 2019 07:00  --------------------------------------------------------  IN: 0 mL / OUT: 3055 mL / NET: -3055 mL    16 Nov 2019 07:01  -  16 Nov 2019 18:57  --------------------------------------------------------  IN: 0 mL / OUT: 420 mL / NET: -420 mL        LABS:                        9.3    11.06 )-----------( 171      ( 16 Nov 2019 05:40 )             29.4     11-16    141  |  105  |  7   ----------------------------<  102<H>  3.8   |  29  |  0.45<L>    Ca    8.4      16 Nov 2019 05:40  Phos  2.2     11-16  Mg     2.0     11-16      PT/INR - ( 15 Nov 2019 05:20 )   PT: 12.6 sec;   INR: 1.11          PTT - ( 15 Nov 2019 05:20 )  PTT:34.0 sec    CAPILLARY BLOOD GLUCOSE            RADIOLOGY & ADDITIONAL STUDIES:

## 2019-11-16 NOTE — PROGRESS NOTE ADULT - SUBJECTIVE AND OBJECTIVE BOX
Patient was seen and examined at bedside. No acute event overnight. No complaints. Pt mobilized to chair this morning. ROS negative.     Vital Signs Last 24 Hrs  T(C): 36.3 (16 Nov 2019 06:00), Max: 37 (15 Nov 2019 17:39)  T(F): 97.3 (16 Nov 2019 06:00), Max: 98.6 (15 Nov 2019 17:39)  HR: 76 (16 Nov 2019 12:20) (68 - 84)  BP: 102/64 (16 Nov 2019 12:20) (102/64 - 149/69)  BP(mean): 78 (16 Nov 2019 12:20) (78 - 99)  RR: 11 (16 Nov 2019 12:20) (11 - 16)  SpO2: 95% (16 Nov 2019 12:20) (93% - 96%)    Physical Exam:  General: NAD  Pulmonary: Nonlabored breathing, no respiratory distress  Cardiovascular: NSR  Abdominal: soft, NT/ND, no organomegaly  Extremities: WWP, normal strength, no clubbing/cyanosis/edema, scds on  Neuro: A/O x3  Pulses: palpable distal pulses    Lines/drains/tubes:    I&O's Summary    15 Nov 2019 07:01  -  16 Nov 2019 07:00  --------------------------------------------------------  IN: 0 mL / OUT: 3055 mL / NET: -3055 mL        LABS:                        9.3    11.06 )-----------( 171      ( 16 Nov 2019 05:40 )             29.4     11-16    141  |  105  |  7   ----------------------------<  102<H>  3.8   |  29  |  0.45<L>    Ca    8.4      16 Nov 2019 05:40  Phos  2.2     11-16  Mg     2.0     11-16      PT/INR - ( 15 Nov 2019 05:20 )   PT: 12.6 sec;   INR: 1.11          PTT - ( 15 Nov 2019 05:20 )  PTT:34.0 sec    CAPILLARY BLOOD GLUCOSE            RADIOLOGY & ADDITIONAL STUDIES:

## 2019-11-16 NOTE — PROGRESS NOTE ADULT - PROBLEM SELECTOR PLAN 1
- 11/12 Bedside EGD was notable for old blood in the stomach with mod gastritis and a large gastric body mass and prior hemoclip. No source of bleeding was identified and no evidence of active bleeding. Review of CTA without obvious finding to suggest UGIB. Pt is now pending EUS evaluation of lesion.    - past hx of dieulafoy's lesion, but no active lesions  - 1 episode of melana overnight. Hb stable at 8.8  - Continue pantoprazole 40 mg BID IV  - Trend CBC q6h  - Transfuse if active bleeding or hemoglobin<7  - Maintain active T&S and large bore IV  - Patient will require IVC filter prior to surgery as she is not a candidate for anticoagulation.   -LE dopplers: NO DVT  -EUS possibly Monday - 11/12 Bedside EGD was notable for old blood in the stomach with mod gastritis and a large gastric body mass and prior hemoclip. No source of bleeding was identified and no evidence of active bleeding. Review of CTA without obvious finding to suggest UGIB. Pt is now pending EUS evaluation of lesion.    - past hx of dieulafoy's lesion, but no active lesions  - Continue pantoprazole 40 mg BID IV  - Trend CBC q6h  - Transfuse if active bleeding or hemoglobin<7  - Maintain active T&S and large bore IV  - Patient will require IVC filter prior to surgery as she is not a candidate for anticoagulation.   -LE dopplers: NO DVT  -EUS possibly Monday - 11/12 Bedside EGD was notable for old blood in the stomach with mod gastritis and a large gastric body mass and prior hemoclip. No source of bleeding was identified and no evidence of active bleeding. Review of CTA without obvious finding to suggest UGIB. Pt is now pending EUS evaluation of lesion.    - past hx of dieulafoy's lesion, but no active lesions  - Continue pantoprazole 40 mg BID IV  - Trend CBC q12h  - Transfuse if active bleeding or hemoglobin<7  - Maintain active T&S and large bore IV  - Patient will require IVC filter prior to surgery as she is not a candidate for anticoagulation.   -LE dopplers: NO DVT  -EUS possibly Monday

## 2019-11-17 NOTE — PROGRESS NOTE ADULT - ASSESSMENT
68F with h/o aortic dissection - multiple repairs (20 years ago), GIB - Known Dieulafoy lesion of stomach/duodenum - recent splenic and left gastric arterial embolizations (11/7 at [facility]), spinal hematoma - paraplegia. baclofen pump in place RLQ Abd, nephrolithiasis w/retained ureteral stent (did not followup for removal), recurrent UTIs - known Hx ESBL organisms - required straight cath, HTN, PAD, and HSV endophthalmitis/keratitis, left corneal transplant admitted for recurrent GI bleed and UTI.

## 2019-11-17 NOTE — PROGRESS NOTE ADULT - SUBJECTIVE AND OBJECTIVE BOX
Patient was seen and examined at bedside. No acute event overnight. No complaints.       cefTRIAXone   IVPB 1000  valACYclovir 1000  cefTRIAXone   IVPB 1000  labetalol 100  valACYclovir 1000      Allergies    No Known Allergies    Intolerances        Vital Signs Last 24 Hrs  T(C): 36.6 (17 Nov 2019 13:43), Max: 36.7 (16 Nov 2019 21:30)  T(F): 97.9 (17 Nov 2019 13:43), Max: 98.1 (16 Nov 2019 21:30)  HR: 76 (17 Nov 2019 11:44) (58 - 82)  BP: 130/60 (17 Nov 2019 11:44) (98/52 - 166/73)  BP(mean): 86 (17 Nov 2019 11:44) (72 - 86)  RR: 26 (17 Nov 2019 11:44) (10 - 26)  SpO2: 96% (17 Nov 2019 11:44) (94% - 96%)  I&O's Summary    16 Nov 2019 07:01  -  17 Nov 2019 07:00  --------------------------------------------------------  IN: 0 mL / OUT: 1620 mL / NET: -1620 mL    17 Nov 2019 07:01  -  17 Nov 2019 15:52  --------------------------------------------------------  IN: 0 mL / OUT: 600 mL / NET: -600 mL      Physical Exam:  General: NAD  Pulmonary: Nonlabored breathing, no respiratory distress  Cardiovascular: NSR  Abdominal: soft, NT/ND, no organomegaly  Extremities: WWP, normal strength, no clubbing/cyanosis/edema  Neuro: A/O x3      LABS:                        9.7    9.67  )-----------( 258      ( 17 Nov 2019 07:23 )             30.6     11-17    139  |  104  |  9   ----------------------------<  105<H>  3.9   |  29  |  0.41<L>    Ca    8.3<L>      17 Nov 2019 07:23  Phos  2.2     11-16  Mg     2.0     11-17

## 2019-11-17 NOTE — PROGRESS NOTE ADULT - ASSESSMENT
67yo F h/o aortic dissection s/p multiple repairs, spinal hematoma resulting in paraplegia, and embolization of splenic and L gastric arteries for GI bleeding, now admitted for GI bleed.  Intubated for airway protection EGD with GI showing large amount of old blood in stomach but no active bleeding, presence of submucosal mass non bleeding. CTA - no active bleeding. Hgb stable, HDS, clinically improving    Plan:   - No acute surgical intervention at this time  - Continue management per CTS, GI and vascular   - Trend CBC  - Maintain 2 large bore IV  - Active type and screen

## 2019-11-17 NOTE — PROGRESS NOTE ADULT - ASSESSMENT
69yo F h/o aortic dissection s/p multiple repairs, spinal hematoma resulting in paraplegia, and embolization of splenic and L gastric arteries for GI bleeding, now admitted for GI bleed.    - F/u EUS - pending for Monday  - No vascular surgical intervention indicated at this time  - Pt fairly stable with no new symptoms (systemic or GI specific)  - Repeat ultrasound duplex of b/l LE on Monday  - Call vascular service for any questions [phone]

## 2019-11-17 NOTE — PROGRESS NOTE ADULT - ASSESSMENT
68F with h/o aortic dissection s/p multiple repairs, spinal hematoma resulting in paraplegia (on baclofen pump), recurrent ESBL UTI, HTN, PAD, and HSV endophthalmitis/keratitis s/p left corneal transplant, multiple GIB w/ multiple endoscopic evaluation and required splenic and left gastric arterial embolization 11/7/19 at [facility] now return to [facility] for AMS transferred to [facility] after found to have hypotension and anemia.    #Melena  Initially pt presented with acute drop in hgb following melenic stool with hemodynamic instability.  Bedside EGD was notable for old blood in the stomach with mod gastritis and a large gastric body mass and prior hemoclip.  No source of bleeding was identified and no evidence of active bleeding.  Review of CTA without obvious finding to suggest UGIB.  Now pending consideration of EUS evaluation of gastric lesion.  Last transfusion 11/13/19 and hgb otherwise stable. no further signs of bleeding  -Continue PPI BID IV  -Trend CBC   -Transfuse per primary team  -Maintain active T&S and large bore IV  -Plan for EUS on Monday, NPO on Sunday MN      Case d/w svc attending

## 2019-11-17 NOTE — PROGRESS NOTE ADULT - SUBJECTIVE AND OBJECTIVE BOX
Incomplete    INTERVAL HPI/OVERNIGHT EVENTS: Diet liberalized, patient is tolerating feeds.     SUBJECTIVE: Patient seen and examined at bedside. No complaints this morning. Denies melena, hematochezia, back pain, headache, chest pain, shortness of breath, dysuria, abdominal pain.     ICU Vital Signs Last 24 Hrs  T(C): 36.6 (17 Nov 2019 13:43), Max: 37 (16 Nov 2019 14:00)  T(F): 97.9 (17 Nov 2019 13:43), Max: 98.6 (16 Nov 2019 14:00)  HR: 76 (17 Nov 2019 11:44) (58 - 82)  BP: 130/60 (17 Nov 2019 11:44) (98/52 - 166/73)  BP(mean): 86 (17 Nov 2019 11:44) (72 - 86)  ABP: --  ABP(mean): --  RR: 26 (17 Nov 2019 11:44) (10 - 26)  SpO2: 96% (17 Nov 2019 11:44) (94% - 96%)    I&O's Summary    16 Nov 2019 07:01  -  17 Nov 2019 07:00  --------------------------------------------------------  IN: 0 mL / OUT: 1620 mL / NET: -1620 mL    17 Nov 2019 07:01  -  17 Nov 2019 13:58  --------------------------------------------------------  IN: 0 mL / OUT: 600 mL / NET: -600 mL          PHYSICAL EXAM:    Constitutional: NAD, paraplegic   HEENT: NC/AT; L eye fixed pupil, keratosis, MMM  Neck: supple  Cardiovascular: +S1/S2, RRR  Respiratory: CTA B/L, no W/R/R  Gastrointestinal: abdomen soft, NT/ND; no rebound or guarding; +BSx4, baclofen pump in place on R side  Genitourinary: hitchcock in place draining urine  Extremities: WWP; no LE edema; no clubbing or cyanosis  Vascular: 2+ radial, DP and pulses B/L  Dermatologic: somewhat pale, normal and turgor; no visible rashes  Neurological: AAOx3; pt with 5/5 strength b/l UE and 4/5 LUE, 3/5 strength RLE. Sensations intact in b/l UE and LE    MEDICATIONS:  MEDICATIONS  (STANDING):  amLODIPine   Tablet 5 milliGRAM(s) Oral daily  artificial  tears Solution 1 Drop(s) Both EYES two times a day  atorvastatin 40 milliGRAM(s) Oral at bedtime  baclofen 20 milliGRAM(s) Oral every 12 hours  Baclofen 480 MICROgram(s)/Day,Morphine 2.4 mG/Day 480 MICROGram(s) IntraThecal Continuous Pump  chlorhexidine 2% Cloths 1 Application(s) Topical daily  DULoxetine 20 milliGRAM(s) Oral every 12 hours  gabapentin 600 milliGRAM(s) Oral every 8 hours  labetalol 100 milliGRAM(s) Oral every 12 hours  meropenem  IVPB 1000 milliGRAM(s) IV Intermittent every 8 hours  pantoprazole  Injectable 40 milliGRAM(s) IV Push two times a day  prednisoLONE acetate 1% Suspension 1 Drop(s) Both EYES four times a day  sodium chloride 0.9% lock flush 3 milliLiter(s) IV Push every 8 hours  sodium chloride 2% Ophthalmic Solution 1 Drop(s) Both EYES daily  valACYclovir 1000 milliGRAM(s) Oral three times a day    MEDICATIONS  (PRN):  acetaminophen   Tablet .. 650 milliGRAM(s) Oral every 6 hours PRN Mild Pain (1 - 3)  melatonin 3 milliGRAM(s) Oral at bedtime PRN Insomnia  oxycodone    5 mG/acetaminophen 325 mG 1 Tablet(s) Oral every 6 hours PRN Moderate Pain (4 - 6)  oxycodone    5 mG/acetaminophen 325 mG 2 Tablet(s) Oral every 6 hours PRN Severe Pain (7 - 10)      ALLERGIES:  Allergies    No Known Allergies    Intolerances        LABS:                        9.3    11.06 )-----------( 171      ( 16 Nov 2019 05:40 )             29.4     11-15    140  |  108  |  6<L>  ----------------------------<  82  4.2   |  24  |  0.35<L>    Ca    8.0<L>      15 Nov 2019 05:20  Phos  2.0     11-15  Mg     1.9     11-15      PT/INR - ( 15 Nov 2019 05:20 )   PT: 12.6 sec;   INR: 1.11          PTT - ( 15 Nov 2019 05:20 )  PTT:34.0 sec      RADIOLOGY & ADDITIONAL TESTS: Reviewed. Incomplete    INTERVAL HPI/OVERNIGHT EVENTS: Diet liberalized, patient is tolerating feeds.     SUBJECTIVE: Patient seen and examined at bedside. No complaints this morning. Denies melena, hematochezia, back pain, headache, chest pain, shortness of breath, dysuria, abdominal pain.     ICU Vital Signs Last 24 Hrs  T(C): 36.6 (17 Nov 2019 13:43), Max: 37 (16 Nov 2019 14:00)  T(F): 97.9 (17 Nov 2019 13:43), Max: 98.6 (16 Nov 2019 14:00)  HR: 76 (17 Nov 2019 11:44) (58 - 82)  BP: 130/60 (17 Nov 2019 11:44) (98/52 - 166/73)  BP(mean): 86 (17 Nov 2019 11:44) (72 - 86)  ABP: --  ABP(mean): --  RR: 26 (17 Nov 2019 11:44) (10 - 26)  SpO2: 96% (17 Nov 2019 11:44) (94% - 96%)    I&O's Summary    16 Nov 2019 07:01  -  17 Nov 2019 07:00  --------------------------------------------------------  IN: 0 mL / OUT: 1620 mL / NET: -1620 mL    17 Nov 2019 07:01  -  17 Nov 2019 13:58  --------------------------------------------------------  IN: 0 mL / OUT: 600 mL / NET: -600 mL      PHYSICAL EXAM:    Constitutional: NAD, paraplegic   HEENT: NC/AT; L eye fixed pupil, keratosis, MMM  Neck: supple  Cardiovascular: +S1/S2, RRR  Respiratory: CTA B/L, no W/R/R  Gastrointestinal: abdomen soft, NT/ND; no rebound or guarding; +BSx4, baclofen pump in place on R side  Genitourinary: hitchcock in place draining urine  Extremities: WWP; no LE edema; no clubbing or cyanosis  Vascular: 2+ radial, DP and pulses B/L  Dermatologic: somewhat pale, normal and turgor; no visible rashes  Neurological: AAOx3; pt with 5/5 strength b/l UE and 3/5 LLE, 2/5 strength RLE. Sensations intact in b/l UE and LE    MEDICATIONS  (STANDING):  artificial  tears Solution 1 Drop(s) Both EYES two times a day  atorvastatin 40 milliGRAM(s) Oral at bedtime  baclofen 20 milliGRAM(s) Oral every 12 hours  Baclofen 480 MICROgram(s)/Day,Morphine 2.4 mG/Day 480 MICROGram(s) IntraThecal Continuous Pump  cefTRIAXone   IVPB 1000 milliGRAM(s) IV Intermittent every 24 hours  chlorhexidine 2% Cloths 1 Application(s) Topical daily  DULoxetine 20 milliGRAM(s) Oral every 12 hours  gabapentin 600 milliGRAM(s) Oral every 8 hours  labetalol 100 milliGRAM(s) Oral every 12 hours  pantoprazole  Injectable 40 milliGRAM(s) IV Push two times a day  prednisoLONE acetate 1% Suspension 1 Drop(s) Both EYES four times a day  sodium chloride 0.9% lock flush 3 milliLiter(s) IV Push every 8 hours  sodium chloride 2% Ophthalmic Solution 1 Drop(s) Both EYES daily  valACYclovir 1000 milliGRAM(s) Oral three times a day    MEDICATIONS  (PRN):  acetaminophen   Tablet .. 650 milliGRAM(s) Oral every 6 hours PRN Mild Pain (1 - 3)  melatonin 3 milliGRAM(s) Oral at bedtime PRN Insomnia  oxycodone    5 mG/acetaminophen 325 mG 1 Tablet(s) Oral every 6 hours PRN Moderate Pain (4 - 6)  oxycodone    5 mG/acetaminophen 325 mG 2 Tablet(s) Oral every 6 hours PRN Severe Pain (7 - 10)      ALLERGIES:  Allergies    No Known Allergies    Intolerances    .  LABS:                         9.7    9.67  )-----------( 258      ( 17 Nov 2019 07:23 )             30.6     11-17    139  |  104  |  9   ----------------------------<  105<H>  3.9   |  29  |  0.41<L>    Ca    8.3<L>      17 Nov 2019 07:23  Phos  2.2     11-16  Mg     2.0     11-17                    RADIOLOGY, EKG & ADDITIONAL TESTS: Reviewed.

## 2019-11-17 NOTE — PROGRESS NOTE ADULT - PROBLEM SELECTOR PLAN 9
F: none  E: Replete electrolytes for K < 4.0 and Mg< 2.0   N: NPO for procedure, resume diet when cleared by GI  DVT: SCDs  GI: Pantoprozole   Lines: R arterial line, Right IJ  Russo placed F: none  E: Replete electrolytes for K < 4.0 and Mg< 2.0   N: NPO for procedure, resume diet when cleared by GI  DVT: SCDs  GI: Pantoprozole   Lines: Right IJ  Russo placed

## 2019-11-17 NOTE — PROGRESS NOTE ADULT - PROBLEM SELECTOR PLAN 1
- 11/12 Bedside EGD was notable for old blood in the stomach with mod gastritis and a large gastric body mass and prior hemoclip. No source of bleeding was identified and no evidence of active bleeding. Review of CTA without obvious finding to suggest UGIB. Pt is now pending EUS evaluation of lesion.    - past hx of dieulafoy's lesion, but no active lesions  - Continue pantoprazole 40 mg BID IV  - Trend CBC q12h  - Transfuse if active bleeding or hemoglobin<7  - Maintain active T&S and large bore IV  - Patient will require IVC filter prior to surgery as she is not a candidate for anticoagulation.   -LE dopplers: NO DVT  -EUS possibly Monday - 11/12 Bedside EGD was notable for old blood in the stomach with mod gastritis and a large gastric body mass and prior hemoclip. No source of bleeding was identified and no evidence of active bleeding. Review of CTA without obvious finding to suggest UGIB. Pt is now pending EUS evaluation of lesion.    - past hx of dieulafoy's lesion, but no active lesions  - Continue pantoprazole 40 mg BID IV  - Trend CBC q12h  - Transfuse if active bleeding or hemoglobin<7  - Maintain active T&S and large bore IV  - Patient will require IVC filter prior to surgery as she is not a candidate for anticoagulation.   -LE dopplers: NO DVT  -EUS 11/18

## 2019-11-17 NOTE — PROGRESS NOTE ADULT - PROBLEM SELECTOR PLAN 2
- pt straight caths herself. pt w/ hx of esbl organisms  - UCx growing enterobacter aerogenes   - f/u blood culture  - pt off contact precautions  - abx changed from meropenem to cefepime (stop date 11/19/19)     #Nephrolithiasis/recurrent UTI  -nephrolithiasis w/retained left ureteral stent (did not followup for removal)  -Planned laser lithotripsy & stent exchange on 11/7 but postponed given HD instability  -urology aware and following - pt straight caths herself. pt w/ hx of esbl organisms  - UCx growing enterobacter aerogenes   - f/u blood culture 11/14: no growth to date  - pt off contact precautions  - abx changed from meropenem to cefepime (stop date 11/19/19)   - Cefepime changed to ceftriaxone stop date 11/19    #Nephrolithiasis/recurrent UTI  -nephrolithiasis w/retained left ureteral stent (did not followup for removal)  -Planned laser lithotripsy & stent exchange on 11/7 but postponed given HD instability  -urology aware and following

## 2019-11-17 NOTE — PROGRESS NOTE ADULT - SUBJECTIVE AND OBJECTIVE BOX
Pt seen and examined at bedside.  No complaints    PERTINENT REVIEW OF SYSTEMS:  CONSTITUTIONAL: No weakness, fevers or chills  HEENT: No visual changes; No vertigo or throat pain   GASTROINTESTINAL: No abdominal or epigastric pain. No nausea, vomiting, or hematemesis; No diarrhea or constipation. No melena or hematochezia.  NEUROLOGICAL: No numbness or weakness  SKIN: No itching, burning, rashes, or lesions     Allergies    No Known Allergies    Intolerances      MEDICATIONS:  MEDICATIONS  (STANDING):  artificial  tears Solution 1 Drop(s) Both EYES two times a day  atorvastatin 40 milliGRAM(s) Oral at bedtime  baclofen 20 milliGRAM(s) Oral every 12 hours  Baclofen 480 MICROgram(s)/Day,Morphine 2.4 mG/Day 480 MICROGram(s) IntraThecal Continuous Pump  cefTRIAXone   IVPB 1000 milliGRAM(s) IV Intermittent every 24 hours  chlorhexidine 2% Cloths 1 Application(s) Topical daily  DULoxetine 20 milliGRAM(s) Oral every 12 hours  gabapentin 600 milliGRAM(s) Oral every 8 hours  labetalol 100 milliGRAM(s) Oral every 12 hours  pantoprazole  Injectable 40 milliGRAM(s) IV Push two times a day  potassium chloride   Powder 20 milliEquivalent(s) Oral once  prednisoLONE acetate 1% Suspension 1 Drop(s) Both EYES four times a day  sodium chloride 0.9% lock flush 3 milliLiter(s) IV Push every 8 hours  sodium chloride 2% Ophthalmic Solution 1 Drop(s) Both EYES daily  valACYclovir 1000 milliGRAM(s) Oral three times a day    MEDICATIONS  (PRN):  acetaminophen   Tablet .. 650 milliGRAM(s) Oral every 6 hours PRN Mild Pain (1 - 3)  melatonin 3 milliGRAM(s) Oral at bedtime PRN Insomnia  oxycodone    5 mG/acetaminophen 325 mG 1 Tablet(s) Oral every 6 hours PRN Moderate Pain (4 - 6)  oxycodone    5 mG/acetaminophen 325 mG 2 Tablet(s) Oral every 6 hours PRN Severe Pain (7 - 10)    Vital Signs Last 24 Hrs  T(C): 36.7 (17 Nov 2019 10:19), Max: 37 (16 Nov 2019 14:00)  T(F): 98 (17 Nov 2019 10:19), Max: 98.6 (16 Nov 2019 14:00)  HR: 76 (17 Nov 2019 08:33) (58 - 82)  BP: 98/52 (17 Nov 2019 08:33) (98/52 - 166/73)  BP(mean): 72 (17 Nov 2019 08:33) (72 - 86)  RR: 17 (17 Nov 2019 08:33) (10 - 22)  SpO2: 94% (17 Nov 2019 08:33) (94% - 96%)    11-16 @ 07:01  -  11-17 @ 07:00  --------------------------------------------------------  IN: 0 mL / OUT: 1620 mL / NET: -1620 mL      PHYSICAL EXAM:    General: Well developed; well nourished; in no acute distress  HEENT: MMM, conjunctiva and sclera clear  Gastrointestinal: Soft non-tender non-distended; Normal bowel sounds; No hepatosplenomegaly. No rebound or guarding  Skin: Warm and dry. No obvious rash    LABS:                        9.7    9.67  )-----------( 258      ( 17 Nov 2019 07:23 )             30.6     11-17    139  |  104  |  9   ----------------------------<  105<H>  3.9   |  29  |  0.41<L>    Ca    8.3<L>      17 Nov 2019 07:23  Phos  2.2     11-16  Mg     2.0     11-17                        Culture - Blood (collected 14 Nov 2019 16:41)  Source: .Blood Blood-Peripheral  Preliminary Report (16 Nov 2019 17:00):    No growth at 2 days.    Culture - Blood (collected 14 Nov 2019 16:41)  Source: .Blood Blood-Peripheral  Preliminary Report (16 Nov 2019 17:00):    No growth at 2 days.      RADIOLOGY & ADDITIONAL STUDIES:

## 2019-11-17 NOTE — PROGRESS NOTE ADULT - SUBJECTIVE AND OBJECTIVE BOX
SUBJECTIVE: Examined at the bedside, resting comfortably. This morning, she feels well; her pain is well-controlled. No nausea or vomiting. Not passing flatus or having BM. No acute complaints.      cefTRIAXone   IVPB 1000 milliGRAM(s) IV Intermittent every 24 hours  labetalol 100 milliGRAM(s) Oral every 12 hours  valACYclovir 1000 milliGRAM(s) Oral three times a day      Vital Signs Last 24 Hrs  T(C): 36.7 (17 Nov 2019 16:42), Max: 36.7 (16 Nov 2019 21:30)  T(F): 98 (17 Nov 2019 16:42), Max: 98.1 (16 Nov 2019 21:30)  HR: 84 (17 Nov 2019 17:18) (58 - 84)  BP: 139/63 (17 Nov 2019 17:18) (98/52 - 166/73)  BP(mean): 90 (17 Nov 2019 17:18) (72 - 90)  RR: 23 (17 Nov 2019 17:18) (10 - 26)  SpO2: 97% (17 Nov 2019 17:18) (94% - 97%)  I&O's Detail    16 Nov 2019 07:01  -  17 Nov 2019 07:00  --------------------------------------------------------  IN:  Total IN: 0 mL    OUT:    Indwelling Catheter - Urethral: 1620 mL  Total OUT: 1620 mL    Total NET: -1620 mL      17 Nov 2019 07:01  -  17 Nov 2019 17:43  --------------------------------------------------------  IN:  Total IN: 0 mL    OUT:    Indwelling Catheter - Urethral: 600 mL  Total OUT: 600 mL    Total NET: -600 mL          General: NAD, resting comfortably in bed  C/V: NSR  Pulm: Nonlabored breathing, no respiratory distress  Abd: soft, non-tender, mildly distended,  baclofen pump in RLQ.  Extrem: WWP, no edema,        LABS:                        9.7    9.67  )-----------( 258      ( 17 Nov 2019 07:23 )             30.6     11-17    139  |  104  |  9   ----------------------------<  105<H>  3.9   |  29  |  0.41<L>    Ca    8.3<L>      17 Nov 2019 07:23  Phos  2.2     11-16  Mg     2.0     11-17            RADIOLOGY & ADDITIONAL STUDIES:

## 2019-11-18 NOTE — PROGRESS NOTE ADULT - PROBLEM SELECTOR PROBLEM 4
Aortic dissection, thoracic

## 2019-11-18 NOTE — PROGRESS NOTE ADULT - ASSESSMENT
68F pt w/PMHx aortic dissection s/p multiple repairs, spinal hematoma resulting in paraplegia, and embolization of splenic and L gastric arteries for GI bleeding, now admitted for recurrent GI bleed. Initially improved after stabilization. Extubated, now reintubated after episode of rebleeding. S/p 4U PRBC today total 6U for admission. No localization of bleeding source although within stomach. No target for repeat embolization. Hemodynamically stable after resuscitation    -Please place Arterial line, continue ICU monitoring  -Russo for strict I/O  -Repeat CBC  -Added on for the AM for p/b total gastrectomy, if patient becomes hemodynamically unstable will take patient tonight to OR. Will follow closely.   -D/W Dr Woods

## 2019-11-18 NOTE — PROGRESS NOTE ADULT - PROBLEM SELECTOR PLAN 6
- c/w amlodipine 5 mg q24h  - pt on home labetalol 300 mg BID, have restarted pt on labetalol 100 mg q12h  - goal SBP <140
- c/w amlodipine 5 mg q24h  - pt on home labetalol 300 mg BID, have restarted pt on labetalol 100 mg q12h  - goal SBP <140  - Started Amlodipine 10 mg for HTN

## 2019-11-18 NOTE — CHART NOTE - NSCHARTNOTEFT_GEN_A_CORE
IR consulted for persistent upper GI/gastric bleed in this 68F with recent prior splenic artery coil embolization and left gastric artery particle embolization. After review of pt history and discussion with Vascular surgery attending and ICU fellow, there is no further role for IR in this case. If the source of bleeding is the stomach, the left gastric artery has been recently embolized and the right gastric artery will be virtually impossible to catheterize through the collateral from the SMA that was accessed for LGA embolization.

## 2019-11-18 NOTE — PROGRESS NOTE ADULT - SUBJECTIVE AND OBJECTIVE BOX
Incomplete    INTERVAL HPI/OVERNIGHT EVENTS: NPO o/n for EUS, started amlodipine 10 due to SBP in 150s.     SUBJECTIVE: Patient seen and examined at bedside. Complains of dry mouth this morning. Denies melena, hematochezia, back pain, headache, chest pain, shortness of breath, dysuria, abdominal pain.     ICU Vital Signs Last 24 Hrs  T(C): 36.7 (18 Nov 2019 13:27), Max: 37.2 (18 Nov 2019 02:00)  T(F): 98 (18 Nov 2019 13:27), Max: 99 (18 Nov 2019 02:00)  HR: 78 (18 Nov 2019 12:08) (72 - 84)  BP: 106/59 (18 Nov 2019 12:08) (106/59 - 169/81)  BP(mean): 85 (18 Nov 2019 08:17) (85 - 116)  ABP: --  ABP(mean): --  RR: 12 (18 Nov 2019 12:08) (12 - 23)  SpO2: 98% (18 Nov 2019 12:08) (95% - 98%)    I&O's Detail  17 Nov 2019 07:01  -  18 Nov 2019 07:00  --------------------------------------------------------  IN:    Oral Fluid: 200 mL  Total IN: 200 mL  OUT:    Indwelling Catheter - Urethral: 2400 mL  Total OUT: 2400 mL  Total NET: -2200 mL  18 Nov 2019 07:01  -  18 Nov 2019 15:26  --------------------------------------------------------  IN:    Albumin 5%  - 250 mL: 100 mL  Total IN: 100 mL    OUT:    Indwelling Catheter - Urethral: 350 mL  Total OUT: 350 mL    Total NET: -250 mL    PHYSICAL EXAM:    Constitutional: NAD, paraplegic   HEENT: NC/AT; L eye fixed pupil, keratosis, MMM  Neck: supple  Cardiovascular: +S1/S2, RRR  Respiratory: CTA B/L, no W/R/R  Gastrointestinal: abdomen soft, NT/ND; no rebound or guarding; +BSx4, baclofen pump in place on R side  Genitourinary: hitchcock in place draining urine  Extremities: WWP; no LE edema; no clubbing or cyanosis  Vascular: 2+ radial, DP and pulses B/L  Dermatologic: somewhat pale, normal and turgor; no visible rashes  Neurological: AAOx3; pt with 5/5 strength b/l UE and 3/5 LLE, 2/5 strength RLE. Sensations intact in b/l UE and LE    MEDICATIONS  (STANDING):  amLODIPine   Tablet 10 milliGRAM(s) Oral daily  artificial  tears Solution 1 Drop(s) Both EYES two times a day  atorvastatin 40 milliGRAM(s) Oral at bedtime  baclofen 20 milliGRAM(s) Oral every 12 hours  Baclofen 480 MICROgram(s)/Day,Morphine 2.4 mG/Day 480 MICROGram(s) IntraThecal Continuous Pump  cefTRIAXone   IVPB 1000 milliGRAM(s) IV Intermittent every 24 hours  chlorhexidine 2% Cloths 1 Application(s) Topical daily  DULoxetine 20 milliGRAM(s) Oral every 12 hours  gabapentin 600 milliGRAM(s) Oral every 8 hours  labetalol 100 milliGRAM(s) Oral every 12 hours  pantoprazole  Injectable 40 milliGRAM(s) IV Push two times a day  polyethylene glycol 3350 17 Gram(s) Oral every 12 hours  prednisoLONE acetate 1% Suspension 1 Drop(s) Both EYES four times a day  sodium chloride 0.9% lock flush 3 milliLiter(s) IV Push every 8 hours  sodium chloride 2% Ophthalmic Solution 1 Drop(s) Both EYES daily  valACYclovir 1000 milliGRAM(s) Oral three times a day    MEDICATIONS  (PRN):  acetaminophen   Tablet .. 650 milliGRAM(s) Oral every 6 hours PRN Mild Pain (1 - 3)  melatonin 3 milliGRAM(s) Oral at bedtime PRN Insomnia  oxycodone    5 mG/acetaminophen 325 mG 1 Tablet(s) Oral every 6 hours PRN Moderate Pain (4 - 6)  oxycodone    5 mG/acetaminophen 325 mG 2 Tablet(s) Oral every 6 hours PRN Severe Pain (7 - 10)      ALLERGIES:  Allergies    No Known Allergies    Intolerances    .  LABS:                         9.7    9.67  )-----------( 258      ( 17 Nov 2019 07:23 )             30.6     11-17    139  |  104  |  9   ----------------------------<  105<H>  3.9   |  29  |  0.41<L>    Ca    8.3<L>      17 Nov 2019 07:23  Phos  2.2     11-16  Mg     2.0     11-17      RADIOLOGY, EKG & ADDITIONAL TESTS: Reviewed. Transfer from [facility] to MICU:  Pt is 68F with h/o aortic dissection - multiple repairs (20 years ago), GIB - possible Dieulafoy lesion of stomach/duodenum - recent splenic and left gastric arterial embolizations (11/7 at [facility]), spinal hematoma - paraplegia - baclofen pump in place RLQ Abd, nephrolithiasis w/ retained ureteral stent (due for stent exchange w/ urology), recurrent UTIs - known Hx ESBL organisms - requires self straight cath, HTN, PAD, and HSV endophthalmitis/keratitis, left corneal transplant initially presenting w/ AMS, hypotension, now admitted to [facility] for recurrent GI bleed and UTI. Pt found to have hypovolemic/septic shock 2/2 to upper GI bleed and UTI. Pt has since received 3u pRBC and was started meropenem for suspected ESBL UTI. CTA chest/abdomen was performed, aortic-entero fistula was ruled out. 11/13 Bedside endoscope done by GI showed old blood in the stomach with moderate gastritis and a large gastric body mass and prior hemoclip.  No source of bleeding was identified and no evidence of active bleeding. Plan for EUS by GI. Urology also consulted for pt for left ureteral stent. Pt was transferred from CT surgery to MICU for further management. Pt without melanotic bleeding and stable hemodynamics, stepped down to [facility]. She was pending a EUS which was deferred Friday, Saturday, Sunday, and Monday 11/15-11/18. On 11/18 she became tachycardic to the 140s, SBP of 90s/50s, was noted to have a melanotic stool, the GI fellow was called, and she was noted to have increasing somnolence while remaining AAOx3. 2 U PRBC were ordered. Levophed gtt was ordered. She was consented for EGD by the GI fellow. D/c'ed antihypertensives.     INTERVAL HPI/OVERNIGHT EVENTS: NPO o/n for EUS, started amlodipine 10 due to SBP in 150s.     SUBJECTIVE: Patient seen and examined at bedside. Complains of dry mouth this morning. Denies melena, hematochezia, back pain, headache, chest pain, shortness of breath, dysuria, abdominal pain.     ICU Vital Signs Last 24 Hrs  T(C): 36.7 (18 Nov 2019 13:27), Max: 37.2 (18 Nov 2019 02:00)  T(F): 98 (18 Nov 2019 13:27), Max: 99 (18 Nov 2019 02:00)  HR: 78 (18 Nov 2019 12:08) (72 - 84)  BP: 106/59 (18 Nov 2019 12:08) (106/59 - 169/81)  BP(mean): 85 (18 Nov 2019 08:17) (85 - 116)  ABP: --  ABP(mean): --  RR: 12 (18 Nov 2019 12:08) (12 - 23)  SpO2: 98% (18 Nov 2019 12:08) (95% - 98%)    I&O's Detail  17 Nov 2019 07:01  -  18 Nov 2019 07:00  --------------------------------------------------------  IN:    Oral Fluid: 200 mL  Total IN: 200 mL  OUT:    Indwelling Catheter - Urethral: 2400 mL  Total OUT: 2400 mL  Total NET: -2200 mL  18 Nov 2019 07:01  -  18 Nov 2019 15:26  --------------------------------------------------------  IN:    Albumin 5%  - 250 mL: 100 mL  Total IN: 100 mL    OUT:    Indwelling Catheter - Urethral: 350 mL  Total OUT: 350 mL    Total NET: -250 mL    PHYSICAL EXAM:    Constitutional: NAD, paraplegic   HEENT: NC/AT; L eye fixed pupil, keratosis, MMM  Neck: supple  Cardiovascular: +S1/S2, RRR  Respiratory: CTA B/L, no W/R/R  Gastrointestinal: abdomen soft, NT/ND; no rebound or guarding; +BSx4, baclofen pump in place on R side  Genitourinary: hitchcock in place draining urine  Extremities: WWP; no LE edema; no clubbing or cyanosis  Vascular: 2+ radial, DP and pulses B/L  Dermatologic: somewhat pale, normal and turgor; no visible rashes  Neurological: AAOx3; pt with 5/5 strength b/l UE and 3/5 LLE, 2/5 strength RLE. Sensations intact in b/l UE and LE    MEDICATIONS  (STANDING):  amLODIPine   Tablet 10 milliGRAM(s) Oral daily  artificial  tears Solution 1 Drop(s) Both EYES two times a day  atorvastatin 40 milliGRAM(s) Oral at bedtime  baclofen 20 milliGRAM(s) Oral every 12 hours  Baclofen 480 MICROgram(s)/Day,Morphine 2.4 mG/Day 480 MICROGram(s) IntraThecal Continuous Pump  cefTRIAXone   IVPB 1000 milliGRAM(s) IV Intermittent every 24 hours  chlorhexidine 2% Cloths 1 Application(s) Topical daily  DULoxetine 20 milliGRAM(s) Oral every 12 hours  gabapentin 600 milliGRAM(s) Oral every 8 hours  labetalol 100 milliGRAM(s) Oral every 12 hours  pantoprazole  Injectable 40 milliGRAM(s) IV Push two times a day  polyethylene glycol 3350 17 Gram(s) Oral every 12 hours  prednisoLONE acetate 1% Suspension 1 Drop(s) Both EYES four times a day  sodium chloride 0.9% lock flush 3 milliLiter(s) IV Push every 8 hours  sodium chloride 2% Ophthalmic Solution 1 Drop(s) Both EYES daily  valACYclovir 1000 milliGRAM(s) Oral three times a day    MEDICATIONS  (PRN):  acetaminophen   Tablet .. 650 milliGRAM(s) Oral every 6 hours PRN Mild Pain (1 - 3)  melatonin 3 milliGRAM(s) Oral at bedtime PRN Insomnia  oxycodone    5 mG/acetaminophen 325 mG 1 Tablet(s) Oral every 6 hours PRN Moderate Pain (4 - 6)  oxycodone    5 mG/acetaminophen 325 mG 2 Tablet(s) Oral every 6 hours PRN Severe Pain (7 - 10)      ALLERGIES:  Allergies    No Known Allergies    Intolerances    .  LABS:                         9.7    9.67  )-----------( 258      ( 17 Nov 2019 07:23 )             30.6     11-17    139  |  104  |  9   ----------------------------<  105<H>  3.9   |  29  |  0.41<L>    Ca    8.3<L>      17 Nov 2019 07:23  Phos  2.2     11-16  Mg     2.0     11-17      RADIOLOGY, EKG & ADDITIONAL TESTS: Reviewed. Transfer from [facility] to MICU:  Pt is 68F with h/o aortic dissection - multiple repairs (20 years ago), GIB - possible Dieulafoy lesion of stomach/duodenum - recent splenic and left gastric arterial embolizations (11/7 at [facility]), spinal hematoma - paraplegia - baclofen pump in place RLQ Abd, nephrolithiasis w/ retained ureteral stent (due for stent exchange w/ urology), recurrent UTIs - known Hx ESBL organisms - requires self straight cath, HTN, PAD, and HSV endophthalmitis/keratitis, left corneal transplant initially presenting w/ AMS, hypotension, now admitted to [facility] for recurrent GI bleed and UTI. Pt found to have hypovolemic/septic shock 2/2 to upper GI bleed and UTI. Pt has since received 3u pRBC and was started meropenem for suspected ESBL UTI. CTA chest/abdomen was performed, aortic-entero fistula was ruled out. 11/13 Bedside endoscope done by GI showed old blood in the stomach with moderate gastritis and a large gastric body mass and prior hemoclip.  No source of bleeding was identified and no evidence of active bleeding. Plan for EUS by GI. Urology also consulted for pt for left ureteral stent. Pt was transferred from CT surgery to MICU for further management. Pt without melanotic bleeding and stable hemodynamics, stepped down to [facility]. She was pending a EUS which was deferred Friday, Saturday, Sunday, and Monday 11/15-11/18. On 11/18 she became tachycardic to the 140s, SBP of 80s-90s/50s, was noted to have a melanotic stool, the GI fellow was called, and she was noted to have increasing somnolence while remaining AAOx3 with worsening pallor. CBC, BCx x2, T&S were drawn. CBC w/Hgb 8.3, down from 9.3 in the AM. 2 U PRBC were ordered. Levophed gtt was ordered. Her  was she was consented for EGD w/push enteroscopy by the GI fellow & for a blood transfusion by the [facility] resident. D/c'ed antihypertensives. Ready for transfer to the MICU.     INTERVAL HPI/OVERNIGHT EVENTS: NPO o/n for EUS, started amlodipine 10 due to SBP in 150s.     SUBJECTIVE: Patient seen and examined at bedside. Complains of dry mouth this morning. Denies melena, hematochezia, back pain, headache, chest pain, shortness of breath, dysuria, abdominal pain.     ICU Vital Signs Last 24 Hrs  T(C): 38.2 (18 Nov 2019 17:00), Max: 38.2 (18 Nov 2019 17:00)  T(F): 100.7 (18 Nov 2019 17:00), Max: 100.7 (18 Nov 2019 17:00)  HR: 136 (18 Nov 2019 17:00) (72 - 136)  BP: 106/75 (18 Nov 2019 17:00) (106/59 - 169/81)  BP(mean): 86 (18 Nov 2019 17:00) (82 - 116)  ABP: --  ABP(mean): --  RR: 20 (18 Nov 2019 17:00) (12 - 20)  SpO2: 95% (18 Nov 2019 17:00) (95% - 98%)    I&O's Detail  17 Nov 2019 07:01  -  18 Nov 2019 07:00  --------------------------------------------------------  IN:    Oral Fluid: 200 mL  Total IN: 200 mL  OUT:    Indwelling Catheter - Urethral: 2400 mL  Total OUT: 2400 mL  Total NET: -2200 mL  18 Nov 2019 07:01  -  18 Nov 2019 15:26  --------------------------------------------------------  IN:    Albumin 5%  - 250 mL: 100 mL  Total IN: 100 mL    OUT:    Indwelling Catheter - Urethral: 350 mL  Total OUT: 350 mL    Total NET: -250 mL    PHYSICAL EXAM:    Constitutional: NAD, paraplegic   HEENT: NC/AT; L eye fixed pupil, keratosis, MMM  Neck: supple  Cardiovascular: +S1/S2, RRR  Respiratory: CTA B/L, no W/R/R  Gastrointestinal: abdomen soft, NT/ND; no rebound or guarding; +BSx4, baclofen pump in place on R side  Genitourinary: hitchcock in place draining urine  Extremities: WWP; no LE edema; no clubbing or cyanosis  Vascular: 2+ radial, DP and pulses B/L  Dermatologic: somewhat pale, normal and turgor; no visible rashes  Neurological: AAOx3; pt with 5/5 strength b/l UE and 3/5 LLE, 2/5 strength RLE. Sensations intact in b/l UE and LE    MEDICATIONS  (STANDING):  amLODIPine   Tablet 10 milliGRAM(s) Oral daily  artificial  tears Solution 1 Drop(s) Both EYES two times a day  atorvastatin 40 milliGRAM(s) Oral at bedtime  baclofen 20 milliGRAM(s) Oral every 12 hours  Baclofen 480 MICROgram(s)/Day,Morphine 2.4 mG/Day 480 MICROGram(s) IntraThecal Continuous Pump  cefTRIAXone   IVPB 1000 milliGRAM(s) IV Intermittent every 24 hours  chlorhexidine 2% Cloths 1 Application(s) Topical daily  DULoxetine 20 milliGRAM(s) Oral every 12 hours  gabapentin 600 milliGRAM(s) Oral every 8 hours  labetalol 100 milliGRAM(s) Oral every 12 hours  pantoprazole  Injectable 40 milliGRAM(s) IV Push two times a day  polyethylene glycol 3350 17 Gram(s) Oral every 12 hours  prednisoLONE acetate 1% Suspension 1 Drop(s) Both EYES four times a day  sodium chloride 0.9% lock flush 3 milliLiter(s) IV Push every 8 hours  sodium chloride 2% Ophthalmic Solution 1 Drop(s) Both EYES daily  valACYclovir 1000 milliGRAM(s) Oral three times a day    MEDICATIONS  (PRN):  acetaminophen   Tablet .. 650 milliGRAM(s) Oral every 6 hours PRN Mild Pain (1 - 3)  melatonin 3 milliGRAM(s) Oral at bedtime PRN Insomnia  oxycodone    5 mG/acetaminophen 325 mG 1 Tablet(s) Oral every 6 hours PRN Moderate Pain (4 - 6)  oxycodone    5 mG/acetaminophen 325 mG 2 Tablet(s) Oral every 6 hours PRN Severe Pain (7 - 10)      ALLERGIES:  Allergies    No Known Allergies    Intolerances    .  LABS:                         9.7    9.67  )-----------( 258      ( 17 Nov 2019 07:23 )             30.6     11-17    139  |  104  |  9   ----------------------------<  105<H>  3.9   |  29  |  0.41<L>    Ca    8.3<L>      17 Nov 2019 07:23  Phos  2.2     11-16  Mg     2.0     11-17      RADIOLOGY, EKG & ADDITIONAL TESTS: Reviewed.

## 2019-11-18 NOTE — PROGRESS NOTE ADULT - SUBJECTIVE AND OBJECTIVE BOX
Vascular Surgery Consult - Progress Note    Subjective/Interval Events:  Patient states her mouth is extremely dry. Denies pain in her abdomen. Denies bloody bowel movement. Denies CP/SOB. Denies N/V. Hgb stable.    Vital Signs Last 24 Hrs  T(C): 36.7 (18 Nov 2019 09:34), Max: 37.2 (18 Nov 2019 02:00)  T(F): 98 (18 Nov 2019 09:34), Max: 99 (18 Nov 2019 02:00)  HR: 78 (18 Nov 2019 12:08) (72 - 84)  BP: 106/59 (18 Nov 2019 12:08) (106/59 - 169/81)  BP(mean): 85 (18 Nov 2019 08:17) (85 - 116)  RR: 12 (18 Nov 2019 12:08) (12 - 23)  SpO2: 98% (18 Nov 2019 12:08) (95% - 98%)    I&O's Summary  17 Nov 2019 07:01  -  18 Nov 2019 07:00  --------------------------------------------------------  IN: 200 mL / OUT: 2400 mL / NET: -2200 mL    18 Nov 2019 07:01  -  18 Nov 2019 13:17  --------------------------------------------------------  IN: 100 mL / OUT: 0 mL / NET: 100 mL    Physical Exam:  General: NAD  Pulmonary: Nonlabored breathing, no respiratory distress  Cardiovascular: NSR  Abdominal: soft, NT/ND, no organomegaly  Extremities: WWP, normal strength, no clubbing/cyanosis/edema  Neuro: A/O x3    LABS:                     9.3    14.34 )-----------( 331      ( 18 Nov 2019 06:48 )             29.9     11-18  138  |  105  |  21  ----------------------------<  108<H>  4.8   |  26  |  0.33<L>    Ca    8.5      18 Nov 2019 06:48  Phos  2.0     11-18  Mg     1.9     11-18    TPro  5.0<L>  /  Alb  2.9<L>  /  TBili  0.3  /  DBili  x   /  AST  11  /  ALT  6<L>  /  AlkPhos  82  11-18    LIVER FUNCTIONS - ( 18 Nov 2019 06:48 )  Alb: 2.9 g/dL / Pro: 5.0 g/dL / ALK PHOS: 82 U/L / ALT: 6 U/L / AST: 11 U/L / GGT: x

## 2019-11-18 NOTE — PROGRESS NOTE ADULT - PROBLEM SELECTOR PLAN 8
- on atorvastatin 40 mg QHS

## 2019-11-18 NOTE — PROGRESS NOTE ADULT - PROBLEM SELECTOR PLAN 5
2/2 HSV endophthalmitis/keratitis, left corneal transplant  - on valtrex 1 g TID  - on prednisolone eye drops, 2% sodium chloride eye drops

## 2019-11-18 NOTE — PROGRESS NOTE ADULT - PROBLEM SELECTOR PROBLEM 2
Urinary tract infection associated with catheterization of urinary tract, unspecified indwelling urinary catheter type, subsequent encounter

## 2019-11-18 NOTE — PROGRESS NOTE ADULT - ASSESSMENT
67yo F h/o aortic dissection s/p multiple repairs, spinal hematoma resulting in paraplegia, and embolization of splenic and L gastric arteries for GI bleeding, now admitted for GI bleed.    - Pending EUS today - will f/u  - No vascular surgical intervention indicated at this time  - Call vascular service for any questions [phone]

## 2019-11-18 NOTE — PROGRESS NOTE ADULT - ASSESSMENT
68F with h/o aortic dissection - multiple repairs (20 years ago), GIB - Known Dieulafoy lesion of stomach/duodenum - recent splenic and left gastric arterial embolizations (11/7 at [facility]), spinal hematoma - paraplegia. baclofen pump in place RLQ Abd, nephrolithiasis w/retained ureteral stent (did not followup for removal), recurrent UTIs - known Hx ESBL organisms - required straight cath, HTN, PAD, and HSV endophthalmitis/keratitis, left corneal transplant admitted for recurrent GI bleed and UTI. Patient transferred back to MICU in setting of active upper GIB.     Neurology  Sedated on Propofol     Cardiovascular  Found to be tachycardic to the 140s and hypotensive with BP 80s-90s/50 after having melanotic bowel movement. Levophed ordered. Hypertensive medication discontinued.   -Start Levophed if remains hypotensive in setting of active GIB and MAPs <65   -Holding hypertensive medications for now. Will resume when hemodynamically stable     Respiratory   Intubated for airway protection for bedside EGD with GI     GI 68F with h/o aortic dissection - multiple repairs (20 years ago), GIB - Known Dieulafoy lesion of stomach/duodenum - recent splenic and left gastric arterial embolizations (11/7 at [facility]), spinal hematoma - paraplegia. baclofen pump in place RLQ Abd, nephrolithiasis w/retained ureteral stent (did not followup for removal), recurrent UTIs - known Hx ESBL organisms - required straight cath, HTN, PAD, and HSV endophthalmitis/keratitis, left corneal transplant admitted for recurrent GI bleed and UTI. Patient transferred back to MICU in setting of active upper GIB.     Neurology  Sedated on Propofol     Cardiovascular  Found to be tachycardic to the 140s and hypotensive with BP 80s-90s/50 after having melanotic bowel movement. Levophed ordered. Hypertensive medication discontinued.   -Start Levophed if remains hypotensive in setting of active GIB and MAPs <65   -Holding hypertensive medications for now. Will resume when hemodynamically stable     Respiratory   Intubated for airway protection for bedside EGD with GI     GI  Prior history of dieulafoy's lesion.   Patient noted to have melanotic stool with tachycardia and hypotension. She was noted to be somnolent with pallor. Stat CBC with hgb 8.3 down from 9.3 in the AM. 2u pRBCs ordered. GI fellow called and will perform bedside EGD. Prior EGD (11/12) was notable for old blood in the stomach with mod gastritis and a large gastric body mass and prior hemoclip. No source of bleeding was identified and no evidence of active bleeding. Review of CTA without obvious finding to suggest UGIB. Pt is now pending EUS evaluation of lesion. 68F with h/o aortic dissection - multiple repairs (20 years ago), GIB - Known Dieulafoy lesion of stomach/duodenum - recent splenic and left gastric arterial embolizations (11/7 at [facility]), spinal hematoma - paraplegia. baclofen pump in place RLQ Abd, nephrolithiasis w/retained ureteral stent (did not followup for removal), recurrent UTIs - known Hx ESBL organisms - required straight cath, HTN, PAD, and HSV endophthalmitis/keratitis, left corneal transplant admitted for recurrent GI bleed and UTI. Patient transferred back to MICU in setting of active upper GIB.     Neurology  Sedated on Propofol     #Functional Paraplegia 2/2 to complications of aortic dissection   -c/w symptomatic management  -on baclofen 20 mg q12h, duloxetine 20 mg q12h, gabapentin 600 mg q8h  -on oxycodone prn for chronic pain  -pt has baclofen pump  -Baclofen withdrawal would present as: Hyperpyrexia, AMS, rebound spascitity, muscle rigidity, rhabdo, multiple organ failure. In the setting of these symptoms would suspect pump failure.       Cardiovascular  Found to be tachycardic to the 140s and hypotensive with BP 80s-90s/50 after having melanotic bowel movement. Levophed ordered. Hypertensive medication discontinued.   -Start Levophed if remains hypotensive in setting of active GIB and MAPs <65   -Holding hypertensive medications for now. Will resume when hemodynamically stable     #Thoracic aortic dissection   Vascular following and states that dissection extending into left common carotid artery is stable. No surgical intervention at this time.   - Will hold Labetalol 100 mg q12h, amlodipine 5 mg q24 in setting of active GI bleed with hemodynamic instability. Resume when appropriate.     #Hypertension  - Holding all hypertensive meds at this time in setting of active UGIB with hypotension (amlodipine 10mg q24h, labetalol 100 mg q12h)    #HLD  Continue Atorvastatin     Hematology   #Normocytic Anemia   Likely 2/2 to active UGIB bleed. Noted to have a drop in hgb from 9.3 to 8.3 after a melanotic BM. Ordered for 2u pRBCs.   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 Patient now noted to have melanotic stool with tachycardia and hypotension. She was noted to be somnolent with pallor. Stat CBC with hgb 8.3 down from 9.3 in the AM. 2u pRBCs ordered. GI fellow called and will perform bedside EGD. Prior EGD (11/12) was notable for old blood in the stomach with mod gastritis and a large gastric body mass and prior hemoclip. No source of bleeding was identified. Patient had been pending EUS for evaluation of dieulafoy lesion.   -F/u results of bedside EGD and GI recs   -Continue Protonix 40mg IB BID   -Ordered for 2u pRBCs, f/u CBC s/p transfusion   -Trend CBC q6h  -Transfuse of CBC <7  - Maintain active T&S and large bore IV    Renal   #UTI 2/2 to enterobacter aerogenes (Ucx). Currently on Ceftriaxone.   -Bcxs from 11/14 with NGTD, f/u   -Continue Ceftriaxone (end date 11/19)     #Nephrolithiasis/Recurrent UTI   Nephrolithiasis w/retained left ureteral stent (did not followup for removal). Pt planned for laser lithotripsy & stent exchange on 11/7 but postponed given HD instability, urology aware and following.   -Urology following, appreciate recs 68F with h/o aortic dissection - multiple repairs (20 years ago), GIB - Known Dieulafoy lesion of stomach/duodenum - recent splenic and left gastric arterial embolizations (11/7 at [facility]), spinal hematoma - paraplegia. baclofen pump in place RLQ Abd, nephrolithiasis w/retained ureteral stent (did not followup for removal), recurrent UTIs - known Hx ESBL organisms - required straight cath, HTN, PAD, and HSV endophthalmitis/keratitis, left corneal transplant admitted for recurrent GI bleed and UTI. Patient transferred back to MICU in setting of active upper GIB.       Neurology  Sedated on Propofol     #Functional Paraplegia 2/2 to complications of aortic dissection   -c/w symptomatic management  -on baclofen 20 mg q12h, duloxetine 20 mg q12h, gabapentin 600 mg q8h  -on oxycodone prn for chronic pain  -pt has baclofen pump  -Baclofen withdrawal would present as: Hyperpyrexia, AMS, rebound spascitity, muscle rigidity, rhabdo, multiple organ failure. In the setting of these symptoms would suspect pump failure.       Cardiovascular  Found to be tachycardic to the 140s and hypotensive with BP 80s-90s/50 after having melanotic bowel movement. Levophed ordered. Hypertensive medication discontinued.   -Start Levophed if remains hypotensive in setting of active GIB and MAPs <65   -Holding hypertensive medications for now. Will resume when hemodynamically stable     #Thoracic aortic dissection   Vascular following and states that dissection extending into left common carotid artery is stable. No surgical intervention at this time.   - Will hold Labetalol 100 mg q12h, amlodipine 5 mg q24 in setting of active GI bleed with hemodynamic instability. Resume when appropriate.     #Hypertension  - Holding all hypertensive meds at this time in setting of active UGIB with hypotension (amlodipine 10mg q24h, labetalol 100 mg q12h)    #HLD  Continue Atorvastatin       Hematology   #Normocytic Anemia   Likely 2/2 to active UGIB bleed. Noted to have a drop in hgb from 9.3 to 8.3 after a melanotic BM. Ordered for 2u pRBCs.   -Transfuse 2u pRBCs, f/u CBC s/p transfusion   -CBC q6hr   -Transfuse for hgb <7      Respiratory   Intubated for airway protection for bedside EGD with GI       GI  Prior history of dieulafoy's lesion. Patient now noted to have melanotic stool with tachycardia and hypotension. She was noted to be somnolent with pallor. Stat CBC with hgb 8.3 down from 9.3 in the AM. 2u pRBCs ordered. GI fellow called and will perform bedside EGD. Prior EGD (11/12) was notable for old blood in the stomach with mod gastritis and a large gastric body mass and prior hemoclip. No source of bleeding was identified. Patient had been pending EUS for evaluation of dieulafoy lesion.   -F/u results of bedside EGD and GI recs   -Continue Protonix 40mg IB BID   -Ordered for 2u pRBCs, f/u CBC s/p transfusion   -Trend CBC q6h  -Transfuse of CBC <7  - Maintain active T&S and large bore IV      Renal   #UTI 2/2 to enterobacter aerogenes (Ucx). Currently on Ceftriaxone.   -Bcxs from 11/14 with NGTD, f/u   -Continue Ceftriaxone (end date 11/19)     #Nephrolithiasis/Recurrent UTI   Nephrolithiasis w/retained left ureteral stent (did not followup for removal). Pt planned for laser lithotripsy & stent exchange on 11/7 but postponed given HD instability, urology aware and following.   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 baclofen pump in place RLQ Abd, nephrolithiasis w/retained ureteral stent (did not followup for removal), recurrent UTIs - known Hx ESBL organisms - required straight cath, HTN, PAD, and HSV endophthalmitis/keratitis, left corneal transplant admitted for recurrent GI bleed and UTI. Patient transferred back to MICU in setting of active upper GIB.       Neurology  Sedated on Propofol     #Functional Paraplegia 2/2 to complications of aortic dissection   -c/w symptomatic management  -on baclofen 20 mg q12h, duloxetine 20 mg q12h, gabapentin 600 mg q8h  -on oxycodone prn for chronic pain  -pt has baclofen pump  -Baclofen withdrawal would present as: Hyperpyrexia, AMS, rebound spascitity, muscle rigidity, rhabdo, multiple organ failure. In the setting of these symptoms would suspect pump failure.       Cardiovascular  Found to be tachycardic to the 140s and hypotensive with BP 80s-90s/50 after having melanotic bowel movement. Levophed ordered. Hypertensive medication discontinued.   -Start Levophed if remains hypotensive in setting of active GIB and MAPs <65   -Holding hypertensive medications for now. Will resume when hemodynamically stable     #Thoracic aortic dissection   Vascular following and states that dissection extending into left common carotid artery is stable. No surgical intervention at this time.   - Will hold Labetalol 100 mg q12h, amlodipine 5 mg q24 in setting of active GI bleed with hemodynamic instability. Resume when appropriate.     #Hypertension  - Holding all hypertensive meds at this time in setting of active UGIB with hypotension (amlodipine 10mg q24h, labetalol 100 mg q12h)    #HLD  Continue Atorvastatin       Hematology   #Normocytic Anemia   Likely 2/2 to active UGIB bleed. Noted to have a drop in hgb from 9.3 to 8.3 after a melanotic BM. Ordered for 2u pRBCs.   -Transfuse 2u pRBCs, f/u CBC s/p transfusion   -CBC q4hr   -Transfuse for hgb <7      Respiratory   Intubated for airway protection for bedside EGD with GI       GI  Prior history of dieulafoy's lesion. Patient now noted to have melanotic stool with tachycardia and hypotension. She was noted to be somnolent with pallor. Stat CBC with hgb 8.3 down from 9.3 in the AM. 2u pRBCs ordered. GI fellow called and will perform bedside EGD. Prior EGD (11/12) was notable for old blood in the stomach with mod gastritis and a large gastric body mass and prior hemoclip. No source of bleeding was identified. Patient had been pending EUS for evaluation of dieulafoy lesion.   -F/u results of bedside EGD and GI recs   -Continue Protonix 40mg IB BID   -Ordered for 2u pRBCs, f/u CBC s/p transfusion   -Trend CBC q4h  -Transfuse of CBC <7  - Maintain active T&S and large bore IV      Renal   #UTI 2/2 to enterobacter aerogenes (Ucx). Currently on Ceftriaxone.   -Bcxs from 11/14 with NGTD, f/u   -Continue Ceftriaxone (end date 11/19)     #Nephrolithiasis/Recurrent UTI   Nephrolithiasis w/retained left ureteral stent (did not followup for removal). Pt planned for laser lithotripsy & stent exchange on 11/7 but postponed given HD instability, urology aware and following.   -Urology following, appreciate recs     ID  UTI as above treating with Ceftriaxone (end date 11/19). Patient noted to spike fever of 101 in afternoon during same time that she became tachycardic, hypotensive and had melanotic BM. Bcxs drawn. Noted to have persistent Leukocytosis. Septic shock likely in setting of infection vs active GI bleed.   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

## 2019-11-18 NOTE — PROCEDURE NOTE - ADDITIONAL PROCEDURE DETAILS
Intubation for airway protection. Pt having significant upper GI bleed. Pre-oxygenated, RSI with cric pressure. Propofol 80mg, Succinylcholine 60mg. DLx1 with MAC 3. Grade 3 view. Very anterior. 7.5 ETT atraumatic to 23cm. +EtCO2, b/l BS.

## 2019-11-18 NOTE — CHART NOTE - NSCHARTNOTEFT_GEN_A_CORE
Admitting Diagnosis:   Patient is a 68y old  Female who presents with a chief complaint of GI bleed (15 Nov 2019 17:52)      PAST MEDICAL & SURGICAL HISTORY:  Melena: 10/7/2019  Gastrointestinal hemorrhage: 9/28/2019, 9/4/2019, 8/29/2019  Dieulafoy lesion of stomach or duodenum: 10/1/2019  Subdural hematoma, nontraumatic: spontaneous  Dorsalgia of lumbar region: on pain medication /baclofen po and pump  Self-catheterizes urinary bladder  Anemia: chronic  Uveitis  Osteoporosis  PAD (peripheral artery disease)  Hematoma: spinal treated September 2018  Paraplegia: due to spinal hematoma, on wheelchair goes to physical therapy 2 x weekly  Aortic dissection, thoracic: Type A Repaired 2009  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 2018  UTI (urinary tract infection): recurrent  TIA (transient ischemic attack)  HTN (Hypertension)  History of corneal transplant: left corneal transplant on 5/21/2018  Disorder of spine: unthetethering 2 x  Presence of IVC filter: 2014 ?  S/P aortic dissection repair: Type A Dissection repair /2009   descending aortic aneurysm repair 9/2016  H/O Spinal surgery: laminectomies 2014      Current Nutrition Order:   Diet, NPO after Midnight:      NPO Start Date: 18-Nov-2019,   NPO Start Time: 23:59 (11-18-19 @ 15:20)      PO Intake: Good (%) [   ]  Fair (50-75%) [   ] Poor (<25%) [   ]- N/A; Pt is currently NPO     GI Issues: Denies N/V/D/C. +BM 11/16.     Pain: No pain reported at this time.     Skin Integrity: Edema 1+ b/l ankles; Stage II pressure injury on coccyx noted.     Labs:   11-18    138  |  105  |  21  ----------------------------<  108<H>  4.8   |  26  |  0.33<L>    Ca    8.5      18 Nov 2019 06:48  Phos  2.0     11-18  Mg     1.9     11-18    TPro  5.0<L>  /  Alb  2.9<L>  /  TBili  0.3  /  DBili  x   /  AST  11  /  ALT  6<L>  /  AlkPhos  82  11-18    CAPILLARY BLOOD GLUCOSE          Medications:  MEDICATIONS  (STANDING):  amLODIPine   Tablet 10 milliGRAM(s) Oral daily  artificial  tears Solution 1 Drop(s) Both EYES two times a day  atorvastatin 40 milliGRAM(s) Oral at bedtime  baclofen 20 milliGRAM(s) Oral every 12 hours  Baclofen 480 MICROgram(s)/Day,Morphine 2.4 mG/Day 480 MICROGram(s) IntraThecal Continuous Pump  cefTRIAXone   IVPB 1000 milliGRAM(s) IV Intermittent every 24 hours  chlorhexidine 2% Cloths 1 Application(s) Topical daily  DULoxetine 20 milliGRAM(s) Oral every 12 hours  gabapentin 600 milliGRAM(s) Oral every 8 hours  labetalol 100 milliGRAM(s) Oral every 12 hours  pantoprazole  Injectable 40 milliGRAM(s) IV Push two times a day  polyethylene glycol 3350 17 Gram(s) Oral every 12 hours  prednisoLONE acetate 1% Suspension 1 Drop(s) Both EYES four times a day  sodium chloride 0.9% lock flush 3 milliLiter(s) IV Push every 8 hours  sodium chloride 2% Ophthalmic Solution 1 Drop(s) Both EYES daily  valACYclovir 1000 milliGRAM(s) Oral three times a day    MEDICATIONS  (PRN):  acetaminophen   Tablet .. 650 milliGRAM(s) Oral every 6 hours PRN Mild Pain (1 - 3)  melatonin 3 milliGRAM(s) Oral at bedtime PRN Insomnia  oxycodone    5 mG/acetaminophen 325 mG 1 Tablet(s) Oral every 6 hours PRN Moderate Pain (4 - 6)  oxycodone    5 mG/acetaminophen 325 mG 2 Tablet(s) Oral every 6 hours PRN Severe Pain (7 - 10)      Weight:  (11/12) 106lbs (48 kg)   (11/15) 134.2lbs (60.9kg) bed     Weight Change: Wt fluctuations likely 2/2 fluid shifts vs. scale error. Continue to monitor.     Nutrition Focused Physical Exam: Completed [ X-11/13/19  ]  Not Pertinent [   ]  From 11/13/19: If admitted weight is accurate, this would indicate a 16lb unintentional wt loss (13% wt change) x2 weeks.   Pt noted to have moderate muscle wasting in clavicular region. Moderate protein calorie malnutrition suspected.     Estimated energy needs:   Height: 5'7" Weight: 105lbs, IBW 135lbs+/-10%, %IBW 77%, BMI 16.4   ABW used for calculations as pt is <80% IBW. Needs adjusted for suspected malnutrition, +pressure injury, underweight.   (25-30 kcal/kg): 1185-4562 kcal/day  (1.4-1.6 g/kg): 66-76 g protein/day  (30-35 ml/kg): 0281-8994 ml/day    Subjective:   Pt is a 68 y.o F h/o aortic dissection s/p multiple repairs, spinal hematoma resulting in paraplegia, and embolization of splenic and L gastric arteries for GI bleeding, now admitted for GI bleed. Pt was admitted last week where she required splenic and L gastric arterial embolization (11/7). After d/c pt presented at [place] [facility] for AMS and was transferred to [facility] after being found w/ hypotension and anemia. Pt w/ acute drop in hgb and HD instability following melenic stool. S/p bedside EGD 11/12 showing large amounts of blood in the stomach but no active bleeding. Pt also w/ notable large amounts of stool and rectal distension on CT. Pt was initially intubated for airway protection, now extubated. Plan for EUS eval of lesion on 11/18 by GI. Urology also consulted for pt for left ureteral stent. Pt was transferred from CT surgery to MICU for further management. Pt now stepped down to [facility]. Per surgery, no acute surgical intervention at this time noted. Pt noted previously NPO x 3 days, PO diet x 3 days, and currently NPO pending EUS. Per previous RD note, no plan for TPN at this time noted.  Pt seen resting in bed, c/o thirst. Explained current NPO status and possible diet progression. RD to f/u per protocol.     Previous Nutrition Diagnosis:  Moderate protein-calorie malnutrition RT unclear etiology AEB 13% wt loss x 2 weeks, NFPE findings include moderate muscle wasting.     Active [ X  ]  Resolved [   ]    Goal: Diet resume within 24-48 hrs; Prevent further s/sx of malnutrition.     Recommendations:  1) When medically feasible, recommend resume PO diet to low-fiber diet. Assess need for ONS.   2) Monitor lytes and replete prn.   3) Obtain and monitor wt trends.   4) If pt should remain on prolonged NPO status and require alternate route for nutrition support, please reconsult nutrition.       Education:   Explained current NPO status and possible diet progression.    Risk Level: High [ X ] Moderate [   ] Low [   ] Admitting Diagnosis:   Patient is a 68y old  Female who presents with a chief complaint of GI bleed (15 Nov 2019 17:52)      PAST MEDICAL & SURGICAL HISTORY:  Melena: 10/7/2019  Gastrointestinal hemorrhage: 9/28/2019, 9/4/2019, 8/29/2019  Dieulafoy lesion of stomach or duodenum: 10/1/2019  Subdural hematoma, nontraumatic: spontaneous  Dorsalgia of lumbar region: on pain medication /baclofen po and pump  Self-catheterizes urinary bladder  Anemia: chronic  Uveitis  Osteoporosis  PAD (peripheral artery disease)  Hematoma: spinal treated September 2018  Paraplegia: due to spinal hematoma, on wheelchair goes to physical therapy 2 x weekly  Aortic dissection, thoracic: Type A Repaired 2009  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 2018  UTI (urinary tract infection): recurrent  TIA (transient ischemic attack)  HTN (Hypertension)  History of corneal transplant: left corneal transplant on 5/21/2018  Disorder of spine: unthetethering 2 x  Presence of IVC filter: 2014 ?  S/P aortic dissection repair: Type A Dissection repair /2009   descending aortic aneurysm repair 9/2016  H/O Spinal surgery: laminectomies 2014      Current Nutrition Order:   Diet, NPO after Midnight:      NPO Start Date: 18-Nov-2019,   NPO Start Time: 23:59 (11-18-19 @ 15:20)      PO Intake: Good (%) [   ]  Fair (50-75%) [   ] Poor (<25%) [   ]- N/A; Pt is currently NPO     GI Issues: Denies N/V/D/C. +BM 11/16.     Pain: No pain reported at this time.     Skin Integrity: Edema 1+ b/l ankles; Stage II pressure injury on coccyx noted.     Labs:   11-18    138  |  105  |  21  ----------------------------<  108<H>  4.8   |  26  |  0.33<L>    Ca    8.5      18 Nov 2019 06:48  Phos  2.0     11-18  Mg     1.9     11-18    TPro  5.0<L>  /  Alb  2.9<L>  /  TBili  0.3  /  DBili  x   /  AST  11  /  ALT  6<L>  /  AlkPhos  82  11-18    CAPILLARY BLOOD GLUCOSE          Medications:  MEDICATIONS  (STANDING):  amLODIPine   Tablet 10 milliGRAM(s) Oral daily  artificial  tears Solution 1 Drop(s) Both EYES two times a day  atorvastatin 40 milliGRAM(s) Oral at bedtime  baclofen 20 milliGRAM(s) Oral every 12 hours  Baclofen 480 MICROgram(s)/Day,Morphine 2.4 mG/Day 480 MICROGram(s) IntraThecal Continuous Pump  cefTRIAXone   IVPB 1000 milliGRAM(s) IV Intermittent every 24 hours  chlorhexidine 2% Cloths 1 Application(s) Topical daily  DULoxetine 20 milliGRAM(s) Oral every 12 hours  gabapentin 600 milliGRAM(s) Oral every 8 hours  labetalol 100 milliGRAM(s) Oral every 12 hours  pantoprazole  Injectable 40 milliGRAM(s) IV Push two times a day  polyethylene glycol 3350 17 Gram(s) Oral every 12 hours  prednisoLONE acetate 1% Suspension 1 Drop(s) Both EYES four times a day  sodium chloride 0.9% lock flush 3 milliLiter(s) IV Push every 8 hours  sodium chloride 2% Ophthalmic Solution 1 Drop(s) Both EYES daily  valACYclovir 1000 milliGRAM(s) Oral three times a day    MEDICATIONS  (PRN):  acetaminophen   Tablet .. 650 milliGRAM(s) Oral every 6 hours PRN Mild Pain (1 - 3)  melatonin 3 milliGRAM(s) Oral at bedtime PRN Insomnia  oxycodone    5 mG/acetaminophen 325 mG 1 Tablet(s) Oral every 6 hours PRN Moderate Pain (4 - 6)  oxycodone    5 mG/acetaminophen 325 mG 2 Tablet(s) Oral every 6 hours PRN Severe Pain (7 - 10)      Weight:  (11/12) 106lbs (48 kg)   (11/15) 134.2lbs (60.9kg) bed     Weight Change: Wt fluctuations likely 2/2 fluid shifts vs. scale error. Continue to monitor.     Nutrition Focused Physical Exam: Completed [ X-11/13/19  ]  Not Pertinent [   ]  From 11/13/19: If admitted weight is accurate, this would indicate a 16lb unintentional wt loss (13% wt change) x2 weeks.   Pt noted to have moderate muscle wasting in clavicular region. Moderate protein calorie malnutrition suspected.     Estimated energy needs:   Height: 5'7" Weight: 105lbs, IBW 135lbs+/-10%, %IBW 77%, BMI 16.4   ABW used for calculations as pt is <80% IBW. Needs adjusted for suspected malnutrition, +pressure injury, underweight.   (25-30 kcal/kg): 7674-2412 kcal/day  (1.4-1.6 g/kg): 66-76 g protein/day  (30-35 ml/kg): 7530-2520 ml/day    Subjective:   Pt is a 68 y.o F h/o aortic dissection s/p multiple repairs, spinal hematoma resulting in paraplegia, and embolization of splenic and L gastric arteries for GI bleeding, now admitted for GI bleed. Pt was admitted last week where she required splenic and L gastric arterial embolization (11/7). After d/c pt presented at [place] [facility] for AMS and was transferred to [facility] after being found w/ hypotension and anemia. Pt w/ acute drop in hgb and HD instability following melenic stool. S/p bedside EGD 11/12 showing large amounts of blood in the stomach but no active bleeding. Pt also w/ notable large amounts of stool and rectal distension on CT. Pt was initially intubated for airway protection, now extubated. Plan for EUS eval of lesion on 11/18 by GI. Urology also consulted for pt for left ureteral stent. Pt was transferred from CT surgery to MICU for further management. Pt now stepped down to [facility]. Per surgery, no acute surgical intervention at this time noted. Pt noted previously NPO x 3 days, PO diet x 3 days, and currently NPO pending EUS. Per previous RD note, no plan for TPN at this time noted. Pt seen resting in bed, appears agitated and c/o thirst. Explained current NPO status and possible diet progression. RD to f/u per protocol.     Previous Nutrition Diagnosis:  Moderate protein-calorie malnutrition RT unclear etiology AEB 13% wt loss x 2 weeks, NFPE findings include moderate muscle wasting.     Active [ X  ]  Resolved [   ]    Goal: Diet resume within 24-48 hrs; Prevent further s/sx of malnutrition.     Recommendations:  1) When medically feasible, recommend resume PO diet to low-fiber diet. Assess need for ONS.   2) Monitor lytes and replete prn.   3) Obtain and monitor wt trends.   4) If pt should remain on prolonged NPO status and require alternate route for nutrition support, please reconsult nutrition.       Education:   Explained current NPO status and possible diet progression.    Risk Level: High [ X ] Moderate [   ] Low [   ]

## 2019-11-18 NOTE — PROGRESS NOTE ADULT - SUBJECTIVE AND OBJECTIVE BOX
Seen and examined at bedside. Stepped up to MICU after melena accompanied with hypotension. This was followed by multiple bouts of hematemesis. Patient is now status post 4U PRBCs, 2L crystalloid resuscitation. With HR in 90s and SBPs in 110s. S/p Emergent upper endoscopy without localization of bleed. Intubated for airway protection.     Vital Signs Last 24 Hrs  T(C): 37.1 (18 Nov 2019 19:00), Max: 38.2 (18 Nov 2019 17:00)  T(F): 98.7 (18 Nov 2019 19:00), Max: 100.7 (18 Nov 2019 17:00)  HR: 96 (18 Nov 2019 22:00) (74 - 142)  BP: 112/59 (18 Nov 2019 22:00) (64/35 - 169/81)  BP(mean): 83 (18 Nov 2019 22:00) (45 - 116)  RR: 15 (18 Nov 2019 22:00) (12 - 20)  SpO2: 100% (18 Nov 2019 22:00) (95% - 100%)  CAPILLARY BLOOD GLUCOSE          11-17 @ 07:01  -  11-18 @ 07:00  --------------------------------------------------------  IN: 200 mL / OUT: 2400 mL / NET: -2200 mL    11-18 @ 07:01  -  11-18 @ 22:41  --------------------------------------------------------  IN: 1200 mL / OUT: 670 mL / NET: 530 mL        acetaminophen   Tablet .. 650 milliGRAM(s) Oral every 6 hours PRN  artificial  tears Solution 1 Drop(s) Both EYES two times a day  atorvastatin 40 milliGRAM(s) Oral at bedtime  baclofen 20 milliGRAM(s) Oral every 12 hours  Baclofen 480 MICROgram(s)/Day,Morphine 2.4 mG/Day 480 MICROGram(s) IntraThecal Continuous Pump  cefTRIAXone   IVPB 1000 milliGRAM(s) IV Intermittent every 24 hours  chlorhexidine 2% Cloths 1 Application(s) Topical daily  DULoxetine 20 milliGRAM(s) Oral every 12 hours  gabapentin 600 milliGRAM(s) Oral every 8 hours  melatonin 3 milliGRAM(s) Oral at bedtime PRN  norepinephrine Infusion 0.05 MICROgram(s)/kG/Min IV Continuous <Continuous>  oxycodone    5 mG/acetaminophen 325 mG 1 Tablet(s) Oral every 6 hours PRN  oxycodone    5 mG/acetaminophen 325 mG 2 Tablet(s) Oral every 6 hours PRN  pantoprazole  Injectable 40 milliGRAM(s) IV Push two times a day  polyethylene glycol 3350 17 Gram(s) Oral every 12 hours  prednisoLONE acetate 1% Suspension 1 Drop(s) Both EYES four times a day  propofol Infusion 5 MICROgram(s)/kG/Min IV Continuous <Continuous>  sodium chloride 0.9% lock flush 3 milliLiter(s) IV Push every 8 hours  sodium chloride 2% Ophthalmic Solution 1 Drop(s) Both EYES daily  valACYclovir 1000 milliGRAM(s) Oral three times a day    LABS:      CBC Full  -  ( 18 Nov 2019 17:08 )  WBC Count : 13.16 K/uL  RBC Count : 2.97 M/uL  Hemoglobin : 8.3 g/dL  Hematocrit : 26.3 %  Platelet Count - Automated : 348 K/uL  Mean Cell Volume : 88.6 fl  Mean Cell Hemoglobin : 27.9 pg  Mean Cell Hemoglobin Concentration : 31.6 gm/dL  Auto Neutrophil # : x  Auto Lymphocyte # : x  Auto Monocyte # : x  Auto Eosinophil # : x  Auto Basophil # : x  Auto Neutrophil % : x  Auto Lymphocyte % : x  Auto Monocyte % : x  Auto Eosinophil % : x  Auto Basophil % : x    11-18    138  |  105  |  21  ----------------------------<  108<H>  4.8   |  26  |  0.33<L>    Ca    8.5      18 Nov 2019 06:48  Phos  2.0     11-18  Mg     1.9     11-18    TPro  5.0<L>  /  Alb  2.9<L>  /  TBili  0.3  /  DBili  x   /  AST  11  /  ALT  6<L>  /  AlkPhos  82  11-18    PT/INR - ( 18 Nov 2019 17:08 )   PT: 12.9 sec;   INR: 1.14          PTT - ( 18 Nov 2019 17:08 )  PTT:35.3 sec

## 2019-11-18 NOTE — PROGRESS NOTE ADULT - PROBLEM SELECTOR PLAN 4
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 No surgical intervention at this time.    - on Labetalol 100 mg q12h, amlodipine 5 mg q24, goal SBP <140

## 2019-11-18 NOTE — PROGRESS NOTE ADULT - PROBLEM SELECTOR PLAN 2
- pt straight caths herself. pt w/ hx of esbl organisms  - UCx growing enterobacter aerogenes   - f/u blood culture 11/14: no growth to date  - pt off contact precautions  - abx changed from meropenem to cefepime (stop date 11/19/19)   - Cefepime changed to ceftriaxone stop date 11/19    #Nephrolithiasis/recurrent UTI  -nephrolithiasis w/retained left ureteral stent (did not followup for removal)  -Planned laser lithotripsy & stent exchange on 11/7 but postponed given HD instability  -urology aware and following

## 2019-11-18 NOTE — PROGRESS NOTE ADULT - ASSESSMENT
68F with h/o aortic dissection s/p multiple repairs, spinal hematoma resulting in paraplegia (on baclofen pump), recurrent ESBL UTI, HTN, PAD, and HSV endophthalmitis/keratitis s/p left corneal transplant, multiple GIB w/ multiple endoscopic evaluation and required splenic and left gastric arterial embolization 11/7/19 at [facility] now return to [place] [facility] for AMS transferred to [facility] after found to have hypotension and anemia.    #Melena  Initially pt presented with acute drop in hgb following melenic stool with hemodynamic instability.  Bedside EGD was notable for old blood in the stomach with mod gastritis and a large gastric body mass and prior hemoclips.  No source of bleeding was identified and no evidence of active bleeding.  Review of CTA without obvious finding to suggest UGIB.  Now pending consideration of EUS evaluation of gastric lesion.  Last transfusion 11/13/19 and hgb otherwise stable. no further signs of bleeding  -Continue PPI BID IV  -Trend CBC   -Transfuse per primary team  -Maintain active T&S and large bore IV  -Tentative plan for EUS tomorrow, keep NPO at MN    Case d/w svc attending and Dr. Ward

## 2019-11-18 NOTE — PROGRESS NOTE ADULT - PROBLEM SELECTOR PLAN 9
F: none  E: Replete electrolytes for K < 4.0 and Mg< 2.0   N: NPO at midnight  DVT: SCDs  GI: Pantoprozole   Lines: Right IJ  Russo placed

## 2019-11-18 NOTE — PROGRESS NOTE ADULT - PROBLEM SELECTOR PLAN 1
- 11/12 Bedside EGD was notable for old blood in the stomach with mod gastritis and a large gastric body mass and prior hemoclip. No source of bleeding was identified and no evidence of active bleeding. Review of CTA without obvious finding to suggest UGIB. Pt is now pending EUS evaluation of lesion.    - past hx of dieulafoy's lesion, but no active lesions  - Continue pantoprazole 40 mg BID IV  - Trend CBC q12h  - Transfuse if active bleeding or hemoglobin<7  - Maintain active T&S and large bore IV  - Patient will require IVC filter prior to surgery as she is not a candidate for anticoagulation.   -LE dopplers: NO DVT  -EUS 11/18 - 11/12 Bedside EGD was notable for old blood in the stomach with mod gastritis and a large gastric body mass and prior hemoclip. No source of bleeding was identified and no evidence of active bleeding. Review of CTA without obvious finding to suggest UGIB. Pt is now pending EUS evaluation of lesion.    - past hx of dieulafoy's lesion, but no active lesions  - Continue pantoprazole 40 mg BID IV  - Trend CBC q12h  - Transfuse if active bleeding or hemoglobin<7  - Maintain active T&S and large bore IV  - Patient will require IVC filter prior to surgery as she is not a candidate for anticoagulation.   -LE dopplers: NO DVT  -EUS 11/18 deferred  -Bleeding again 11/18, 2 U PRBC ordered, antihypertensives dc'ed, urgent EGD planned. Levophed gtt started

## 2019-11-18 NOTE — PROGRESS NOTE ADULT - ASSESSMENT
68F with h/o aortic dissection - multiple repairs (20 years ago), GIB - Known Dieulafoy lesion of stomach/duodenum - recent splenic and left gastric arterial embolizations (11/7 at [facility]), spinal hematoma - paraplegia. baclofen pump in place RLQ Abd, nephrolithiasis w/retained ureteral stent (did not followup for removal), recurrent UTIs - known Hx ESBL organisms - required straight cath, HTN, PAD, and HSV endophthalmitis/keratitis, left corneal transplant admitted for recurrent GI bleed and UTI.

## 2019-11-18 NOTE — CHART NOTE - NSCHARTNOTEFT_GEN_A_CORE
Patient was brought in to MICU for recurrent GI bleed ( UGIB with obscure source).   GI consulted and did bed side EGD after intubation, significant amount of clotted blood found in stomach with no active bleed. Gi recommended IR and surgery consult as source control was not possible with GI intervention.   Case discussed at length with GI , IR , vascular and general surgery. Since patient has had recent prior splenic artery coil embolization and left gastric artery particle embolization, there is no further role for IR in this case. If the source of bleeding is the stomach, the left gastric artery has been recently embolized and the right gastric artery will be virtually impossible to catheterize through the collateral from the SMA that was accessed for LGA embolization.   Finally it was decided to watch the patient overnight , if she shows signs of bleeding , will get a CTA in hope to find culprit vessel.   Call GI if she bleeds again.   Call surgery if she bleeds or becomes unstable again.  Plan for repeat EGD in AM per GI.   Plan for gastrectomy per Surgery in AM or overnight if she becomes unstable.  I have spoken about all this to her  Alexis Portillo.   Surgery will contact him as well about possible gastrectomy.   Discussed with Dr Reed.

## 2019-11-18 NOTE — PROGRESS NOTE ADULT - PROBLEM SELECTOR PLAN 7
likely from UGIB  -Plan as above
likely from UGIB  -Plan as above  - EUS rescheduled to 11/19.

## 2019-11-18 NOTE — PROGRESS NOTE ADULT - SUBJECTIVE AND OBJECTIVE BOX
OVERNIGHT EVENTS:    SUBJECTIVE / INTERVAL HPI: Patient seen and examined at bedside.     VITAL SIGNS:  Vital Signs Last 24 Hrs  T(C): 38.2 (18 Nov 2019 17:00), Max: 38.2 (18 Nov 2019 17:00)  T(F): 100.7 (18 Nov 2019 17:00), Max: 100.7 (18 Nov 2019 17:00)  HR: 114 (18 Nov 2019 17:39) (72 - 136)  BP: 90/50 (18 Nov 2019 18:17) (64/35 - 169/81)  BP(mean): 60 (18 Nov 2019 18:07) (45 - 116)  RR: 15 (18 Nov 2019 17:39) (12 - 20)  SpO2: 100% (18 Nov 2019 17:39) (95% - 100%)    PHYSICAL EXAM:    General: WDWN  HEENT: NC/AT; PERRL, anicteric sclera; MMM  Neck: supple  Cardiovascular: +S1/S2; RRR  Respiratory: CTA B/L; no W/R/R  Gastrointestinal: soft, NT/ND; +BSx4  Extremities: WWP; no edema, clubbing or cyanosis  Vascular: 2+ radial, DP/PT pulses B/L  Neurological: AAOx3; no focal deficits    MEDICATIONS:  MEDICATIONS  (STANDING):  artificial  tears Solution 1 Drop(s) Both EYES two times a day  atorvastatin 40 milliGRAM(s) Oral at bedtime  baclofen 20 milliGRAM(s) Oral every 12 hours  Baclofen 480 MICROgram(s)/Day,Morphine 2.4 mG/Day 480 MICROGram(s) IntraThecal Continuous Pump  cefTRIAXone   IVPB 1000 milliGRAM(s) IV Intermittent every 24 hours  chlorhexidine 2% Cloths 1 Application(s) Topical daily  DULoxetine 20 milliGRAM(s) Oral every 12 hours  gabapentin 600 milliGRAM(s) Oral every 8 hours  metoclopramide 10 milliGRAM(s) Oral once  norepinephrine Infusion 0.05 MICROgram(s)/kG/Min (2.25 mL/Hr) IV Continuous <Continuous>  pantoprazole  Injectable 40 milliGRAM(s) IV Push two times a day  polyethylene glycol 3350 17 Gram(s) Oral every 12 hours  prednisoLONE acetate 1% Suspension 1 Drop(s) Both EYES four times a day  propofol Infusion 5 MICROgram(s)/kG/Min (1.44 mL/Hr) IV Continuous <Continuous>  sodium chloride 0.9% lock flush 3 milliLiter(s) IV Push every 8 hours  sodium chloride 2% Ophthalmic Solution 1 Drop(s) Both EYES daily  valACYclovir 1000 milliGRAM(s) Oral three times a day    MEDICATIONS  (PRN):  acetaminophen   Tablet .. 650 milliGRAM(s) Oral every 6 hours PRN Mild Pain (1 - 3)  melatonin 3 milliGRAM(s) Oral at bedtime PRN Insomnia  oxycodone    5 mG/acetaminophen 325 mG 1 Tablet(s) Oral every 6 hours PRN Moderate Pain (4 - 6)  oxycodone    5 mG/acetaminophen 325 mG 2 Tablet(s) Oral every 6 hours PRN Severe Pain (7 - 10)      ALLERGIES:  Allergies    No Known Allergies    Intolerances        LABS:                        8.3    13.16 )-----------( 348      ( 18 Nov 2019 17:08 )             26.3     11-18    138  |  105  |  21  ----------------------------<  108<H>  4.8   |  26  |  0.33<L>    Ca    8.5      18 Nov 2019 06:48  Phos  2.0     11-18  Mg     1.9     11-18    TPro  5.0<L>  /  Alb  2.9<L>  /  TBili  0.3  /  DBili  x   /  AST  11  /  ALT  6<L>  /  AlkPhos  82  11-18    PT/INR - ( 18 Nov 2019 17:08 )   PT: 12.9 sec;   INR: 1.14          PTT - ( 18 Nov 2019 17:08 )  PTT:35.3 sec    CAPILLARY BLOOD GLUCOSE          RADIOLOGY & ADDITIONAL TESTS: Reviewed. HOSPITAL COURSE:  Pt is 68F with h/o aortic dissection - multiple repairs (20 years ago), GIB - possible Dieulafoy lesion of stomach/duodenum - recent splenic and left gastric arterial embolizations (11/7 at [facility]), spinal hematoma - paraplegia - baclofen pump in place RLQ Abd, nephrolithiasis w/ retained ureteral stent (due for stent exchange w/ urology), recurrent UTIs - known Hx ESBL organisms - requires self straight cath, HTN, PAD, and HSV endophthalmitis/keratitis, left corneal transplant initially presenting w/ AMS, hypotension, now admitted to [facility] for recurrent GI bleed and UTI. Pt found to have hypovolemic/septic shock 2/2 to upper GI bleed and UTI. Pt has since received 3u pRBC and was started meropenem for suspected ESBL UTI. CTA chest/abdomen was performed, aortic-entero fistula was ruled out. 11/13 Bedside endoscope done by GI showed old blood in the stomach with moderate gastritis and a large gastric body mass and prior hemoclip.  No source of bleeding was identified and no evidence of active bleeding. Plan for EUS by GI. Urology also consulted for pt for left ureteral stent. Pt was transferred from CT surgery to MICU for further management. Pt without melanotic bleeding and stable hemodynamics, stepped down to [facility]. She was pending a EUS which was deferred Friday, Saturday, Sunday, and Monday 11/15-11/18. On 11/18 she became tachycardic to the 140s, SBP of 80s-90s/50s, was noted to have a melanotic stool, the GI fellow was called, and she was noted to have increasing somnolence while remaining AAOx3 with worsening pallor. CBC, BCx x2, T&S were drawn. CBC w/Hgb 8.3, down from 9.3 in the AM. 2 U PRBC were ordered. Levophed gtt was ordered. Her  was called and she was consented for EGD w/ push enteroscopy by the GI fellow & for a blood transfusion by the 7 [facility] resident. D/c'ed antihypertensives. Ready for transfer to the MICU.       SUBJECTIVE / INTERVAL HPI: Patient seen and examined at bedside. Patient resting in bed, intubated and sedated on propofol. Unable to obtain full ROS.     VITAL SIGNS:  Vital Signs Last 24 Hrs  T(C): 38.2 (18 Nov 2019 17:00), Max: 38.2 (18 Nov 2019 17:00)  T(F): 100.7 (18 Nov 2019 17:00), Max: 100.7 (18 Nov 2019 17:00)  HR: 114 (18 Nov 2019 17:39) (72 - 136)  BP: 90/50 (18 Nov 2019 18:17) (64/35 - 169/81)  BP(mean): 60 (18 Nov 2019 18:07) (45 - 116)  RR: 15 (18 Nov 2019 17:39) (12 - 20)  SpO2: 100% (18 Nov 2019 17:39) (95% - 100%)    PHYSICAL EXAM:    General: WDWN, resting in bed, sedated   HEENT: NC/AT; R pupil reactive to light, L eye with fixed pupil nonreactive to light, anicteric sclera; MMM  Neck: supple  Cardiovascular: +S1/S2; tachycardic, regular rhythm   Respiratory: intubated, CTA B/L; no W/R/R  Gastrointestinal: soft, NT/ND; +BSx4, baclofen pump in place on R side   Extremities: WWP; no edema, clubbing or cyanosis  Vascular: 2+ radial, DP/PT pulses B/L  Neurological: sedated on propofol. unresponsive to painful/verbal stimuli     MEDICATIONS:  MEDICATIONS  (STANDING):  artificial  tears Solution 1 Drop(s) Both EYES two times a day  atorvastatin 40 milliGRAM(s) Oral at bedtime  baclofen 20 milliGRAM(s) Oral every 12 hours  Baclofen 480 MICROgram(s)/Day,Morphine 2.4 mG/Day 480 MICROGram(s) IntraThecal Continuous Pump  cefTRIAXone   IVPB 1000 milliGRAM(s) IV Intermittent every 24 hours  chlorhexidine 2% Cloths 1 Application(s) Topical daily  DULoxetine 20 milliGRAM(s) Oral every 12 hours  gabapentin 600 milliGRAM(s) Oral every 8 hours  metoclopramide 10 milliGRAM(s) Oral once  norepinephrine Infusion 0.05 MICROgram(s)/kG/Min (2.25 mL/Hr) IV Continuous <Continuous>  pantoprazole  Injectable 40 milliGRAM(s) IV Push two times a day  polyethylene glycol 3350 17 Gram(s) Oral every 12 hours  prednisoLONE acetate 1% Suspension 1 Drop(s) Both EYES four times a day  propofol Infusion 5 MICROgram(s)/kG/Min (1.44 mL/Hr) IV Continuous <Continuous>  sodium chloride 0.9% lock flush 3 milliLiter(s) IV Push every 8 hours  sodium chloride 2% Ophthalmic Solution 1 Drop(s) Both EYES daily  valACYclovir 1000 milliGRAM(s) Oral three times a day    MEDICATIONS  (PRN):  acetaminophen   Tablet .. 650 milliGRAM(s) Oral every 6 hours PRN Mild Pain (1 - 3)  melatonin 3 milliGRAM(s) Oral at bedtime PRN Insomnia  oxycodone    5 mG/acetaminophen 325 mG 1 Tablet(s) Oral every 6 hours PRN Moderate Pain (4 - 6)  oxycodone    5 mG/acetaminophen 325 mG 2 Tablet(s) Oral every 6 hours PRN Severe Pain (7 - 10)      ALLERGIES:  Allergies    No Known Allergies    Intolerances        LABS:                        8.3    13.16 )-----------( 348      ( 18 Nov 2019 17:08 )             26.3     11-18    138  |  105  |  21  ----------------------------<  108<H>  4.8   |  26  |  0.33<L>    Ca    8.5      18 Nov 2019 06:48  Phos  2.0     11-18  Mg     1.9     11-18    TPro  5.0<L>  /  Alb  2.9<L>  /  TBili  0.3  /  DBili  x   /  AST  11  /  ALT  6<L>  /  AlkPhos  82  11-18    PT/INR - ( 18 Nov 2019 17:08 )   PT: 12.9 sec;   INR: 1.14          PTT - ( 18 Nov 2019 17:08 )  PTT:35.3 sec    CAPILLARY BLOOD GLUCOSE          RADIOLOGY & ADDITIONAL TESTS: Reviewed.

## 2019-11-18 NOTE — PROGRESS NOTE ADULT - SUBJECTIVE AND OBJECTIVE BOX
Pt seen and examined at bedside.  Patient feels well and denies further bleeding.  Denies having a BM yesterday, fever, chill, chest pain, shortness of breath, abdominal or epigastric pain, nausea, vomiting.       Allergies    No Known Allergies    Intolerances    MEDICATIONS:  MEDICATIONS  (STANDING):  amLODIPine   Tablet 10 milliGRAM(s) Oral daily  artificial  tears Solution 1 Drop(s) Both EYES two times a day  atorvastatin 40 milliGRAM(s) Oral at bedtime  baclofen 20 milliGRAM(s) Oral every 12 hours  Baclofen 480 MICROgram(s)/Day,Morphine 2.4 mG/Day 480 MICROGram(s) IntraThecal Continuous Pump  cefTRIAXone   IVPB 1000 milliGRAM(s) IV Intermittent every 24 hours  chlorhexidine 2% Cloths 1 Application(s) Topical daily  DULoxetine 20 milliGRAM(s) Oral every 12 hours  gabapentin 600 milliGRAM(s) Oral every 8 hours  labetalol 100 milliGRAM(s) Oral every 12 hours  pantoprazole  Injectable 40 milliGRAM(s) IV Push two times a day  polyethylene glycol 3350 17 Gram(s) Oral every 12 hours  prednisoLONE acetate 1% Suspension 1 Drop(s) Both EYES four times a day  sodium chloride 0.9% lock flush 3 milliLiter(s) IV Push every 8 hours  sodium chloride 2% Ophthalmic Solution 1 Drop(s) Both EYES daily  valACYclovir 1000 milliGRAM(s) Oral three times a day    MEDICATIONS  (PRN):  acetaminophen   Tablet .. 650 milliGRAM(s) Oral every 6 hours PRN Mild Pain (1 - 3)  melatonin 3 milliGRAM(s) Oral at bedtime PRN Insomnia  oxycodone    5 mG/acetaminophen 325 mG 1 Tablet(s) Oral every 6 hours PRN Moderate Pain (4 - 6)  oxycodone    5 mG/acetaminophen 325 mG 2 Tablet(s) Oral every 6 hours PRN Severe Pain (7 - 10)    Vital Signs Last 24 Hrs  T(C): 36.7 (18 Nov 2019 13:27), Max: 37.2 (18 Nov 2019 02:00)  T(F): 98 (18 Nov 2019 13:27), Max: 99 (18 Nov 2019 02:00)  HR: 78 (18 Nov 2019 12:08) (72 - 84)  BP: 106/59 (18 Nov 2019 12:08) (106/59 - 169/81)  BP(mean): 85 (18 Nov 2019 08:17) (85 - 116)  RR: 12 (18 Nov 2019 12:08) (12 - 23)  SpO2: 98% (18 Nov 2019 12:08) (95% - 98%)    11-17 @ 07:01  -  11-18 @ 07:00  --------------------------------------------------------  IN: 200 mL / OUT: 2400 mL / NET: -2200 mL    11-18 @ 07:01  -  11-18 @ 14:44  --------------------------------------------------------  IN: 100 mL / OUT: 350 mL / NET: -250 mL      PHYSICAL EXAM:    General: Elderly female comfortable in bed, in nad  HEENT: MMM, conjunctiva and sclera clear  Gastrointestinal: Soft non-tender non-distended; Normal bowel sounds; No rebound or guarding  Skin: Warm and dry. No obvious rash  Neuro: AAOx3, follows command    LABS:                        9.3    14.34 )-----------( 331      ( 18 Nov 2019 06:48 )             29.9     11-18    138  |  105  |  21  ----------------------------<  108<H>  4.8   |  26  |  0.33<L>    Ca    8.5      18 Nov 2019 06:48  Phos  2.0     11-18  Mg     1.9     11-18    TPro  5.0<L>  /  Alb  2.9<L>  /  TBili  0.3  /  DBili  x   /  AST  11  /  ALT  6<L>  /  AlkPhos  82  11-18

## 2019-11-18 NOTE — PROGRESS NOTE ADULT - PROBLEM SELECTOR PLAN 3
Functional paraplegia 2/2 complications of aortic dissection  - c/w symptomatic management  - on baclofen 20 mg q12h, duloxetine 20 mg q12h, gabapentin 600 mg q8h  - on oxycodone prn for chronic pain  - pt has baclofen pump, f/u with Deborah from pharmacy for recommendations for management. Risk of withdrawal.
Functional paraplegia 2/2 complications of aortic dissection  - c/w symptomatic management  - on baclofen 20 mg q12h, duloxetine 20 mg q12h, gabapentin 600 mg q8h  - on oxycodone prn for chronic pain  - pt has baclofen pump  - Baclofen withdrawal would present as: Hyperpyrexia, AMS, rebound spascitity, muscle rigidity, rhabdo, multiple organ failure. In the setting of these symptoms would suspect pump failure.

## 2019-11-18 NOTE — PROGRESS NOTE ADULT - PROBLEM SELECTOR PLAN 10
1) PCP Contacted on Admission: (Y/N) --> Name & Phone #:   2) Date of Contact with PCP: TBD  3) PCP Contacted at Discharge: TBD  4) Summary of Handoff Given to PCP: TBD   5) Post-Discharge Appointment Date: TBD

## 2019-11-19 NOTE — PROGRESS NOTE ADULT - SUBJECTIVE AND OBJECTIVE BOX
SUBJECTIVE: Patient seen and examined bedside by chief resident. In the interval patient was stepped up to MICU, has received 4 U PRBC, 2L of crystalloid, intubated for airway protection, off of Levophed, HD stable in BPs of 110s on a-line this AM. Repeat EGD overnight without localized source of bleed. Added on for p/b total gastrectomy. Continuing to monitor for instability    cefTRIAXone   IVPB 1000 milliGRAM(s) IV Intermittent every 24 hours  norepinephrine Infusion 0.05 MICROgram(s)/kG/Min IV Continuous <Continuous>  valACYclovir 1000 milliGRAM(s) Oral three times a day      Vital Signs Last 24 Hrs  T(C): 36.4 (19 Nov 2019 06:03), Max: 38.2 (18 Nov 2019 17:00)  T(F): 97.6 (19 Nov 2019 06:03), Max: 100.7 (18 Nov 2019 17:00)  HR: 108 (19 Nov 2019 08:00) (78 - 142)  BP: 125/68 (19 Nov 2019 08:00) (64/35 - 135/69)  BP(mean): 90 (19 Nov 2019 08:00) (45 - 95)  RR: 14 (19 Nov 2019 08:00) (12 - 20)  SpO2: 99% (19 Nov 2019 08:00) (95% - 100%)  I&O's Detail    18 Nov 2019 07:01  -  19 Nov 2019 07:00  --------------------------------------------------------  IN:    Albumin 5%  - 250 mL: 100 mL    norepinephrine Infusion: 75 mL    Packed Red Blood Cells: 900 mL    propofol Infusion: 182.6 mL    Sodium Chloride 0.9% IV Bolus: 500 mL    Solution: 80 mL  Total IN: 1837.6 mL    OUT:    Indwelling Catheter - Urethral: 985 mL  Total OUT: 985 mL    Total NET: 852.6 mL      19 Nov 2019 07:01  -  19 Nov 2019 08:30  --------------------------------------------------------  IN:    propofol Infusion: 14.9 mL  Total IN: 14.9 mL    OUT:  Total OUT: 0 mL    Total NET: 14.9 mL          General: NAD, resting comfortably in bed  C/V: Tachycardic  Pulm: Intubated, Nonlabored breathing, no respiratory distress  Abd: soft, NT/ND. No evidence of bleeding.  Rectal: trace melena  Extrem: WWP, no edema, SCDs in place        LABS:                        10.7   16.21 )-----------( 232      ( 19 Nov 2019 05:07 )             32.5     11-19    140  |  112<H>  |  29<H>  ----------------------------<  109<H>  3.9   |  20<L>  |  0.43<L>    Ca    7.5<L>      19 Nov 2019 05:07  Phos  3.6     11-19  Mg     1.7     11-19    TPro  5.0<L>  /  Alb  2.9<L>  /  TBili  0.3  /  DBili  x   /  AST  11  /  ALT  6<L>  /  AlkPhos  82  11-18    PT/INR - ( 19 Nov 2019 05:07 )   PT: 13.3 sec;   INR: 1.17          PTT - ( 19 Nov 2019 05:07 )  PTT:31.8 sec      RADIOLOGY & ADDITIONAL STUDIES:

## 2019-11-19 NOTE — PRE-OP CHECKLIST - SELECT TESTS ORDERED
CMP/Hepatic Function/CXR/EKG/POCT Blood Glucose/PT/PTT/Type and Screen/INR/Type and Cross/CBC/Urinalysis

## 2019-11-19 NOTE — PROGRESS NOTE ADULT - SUBJECTIVE AND OBJECTIVE BOX
PRE-OP NOTE    Pre-op Diagnosis: RECURRENT GI BLEED  GI BLEED    Events since yesterday: Patient stepped up to MICU and intubated for airway control. Off Levophed. Has received 4U pRBCs since yesterday. Repeat EGD revealed no source of bleed. Added on today for total gastrectomy.     Surgeon: Peggy  Procedure: Total Gastrectomy                          10.7   16.21 )-----------( 232      ( 19 Nov 2019 05:07 )             32.5     11-19    140  |  112<H>  |  29<H>  ----------------------------<  109<H>  3.9   |  20<L>  |  0.43<L>    Ca    7.5<L>      19 Nov 2019 05:07  Phos  3.6     11-19  Mg     1.7     11-19    TPro  5.0<L>  /  Alb  2.9<L>  /  TBili  0.3  /  DBili  x   /  AST  11  /  ALT  6<L>  /  AlkPhos  82  11-18    PT/INR - ( 19 Nov 2019 05:07 )   PT: 13.3 sec;   INR: 1.17          PTT - ( 19 Nov 2019 05:07 )  PTT:31.8 sec    CAPILLARY BLOOD GLUCOSE        LIVER FUNCTIONS - ( 18 Nov 2019 06:48 )  Alb: 2.9 g/dL / Pro: 5.0 g/dL / ALK PHOS: 82 U/L / ALT: 6 U/L / AST: 11 U/L / GGT: x             Type & Screen:ABO Interpretation: A (11-18 @ 17:03)    CXR:   Stable positioning of support devices. Stable   cardiomediastinal silhouette status post median sternotomy. Surgical   clips. No acute focal opacity. Stable bony structures. Left hemithorax   skinfolds.      EKG:   Sinus tachycardia  Left axis deviation  Low voltage QRS  Nonspecific T wave abnormality      A/P: 68y Female pre-op for above procedure  -NPO  -2U PRBC on hold  - IVF   - consent: in the chart

## 2019-11-19 NOTE — PROGRESS NOTE ADULT - ASSESSMENT
68F pt w/PMHx aortic dissection s/p multiple repairs, spinal hematoma resulting in paraplegia, and embolization of splenic and L gastric arteries for GI bleeding, now admitted for recurrent GI bleed. Initially improved after stabilization. Extubated, now reintubated after episode of rebleeding. S/p 4U PRBC today total 6U for admission. No localization of bleeding source although within stomach. No target for repeat embolization. Hemodynamically stable after resuscitation    -Continue ICU monitoring  -Added on for this AM for p/b total gastrectomy, if patient becomes hemodynamically unstable will take patient sooner to OR. Will follow closely.   -D/W Dr Woods

## 2019-11-19 NOTE — PROGRESS NOTE ADULT - ASSESSMENT
69yo F h/o aortic dissection s/p multiple repairs, spinal hematoma resulting in paraplegia, and embolization of splenic and L gastric arteries for GI bleeding, now with recurrent GI bleed.    - Given continued bleeding requiring multiple transfusions, with unclear source after multiple EGDs and CTAs, will be taken by surgery for gastrectomy today  - No vascular surgical intervention indicated at this time  - Will continue to follow  - Call vascular service for any questions [phone]

## 2019-11-19 NOTE — PROGRESS NOTE ADULT - SUBJECTIVE AND OBJECTIVE BOX
Vascular Surgery Consult - Progress Note    Subjective/Interval Events:  Patient intubated/sedated. Episode of melena yesterday afternoon a/w a 1U Hgb drop and tachycardia. Patient transferred to MICU, underwent emergent EGD which showed old clotted blood, no active bleeding. Required 4U pRBC overnight.    Vital Signs Last 24 Hrs  T(C): 37.9 (19 Nov 2019 09:44), Max: 38.2 (18 Nov 2019 17:00)  T(F): 100.2 (19 Nov 2019 09:44), Max: 100.7 (18 Nov 2019 17:00)  HR: 114 (19 Nov 2019 11:00) (78 - 142)  BP: 110/59 (19 Nov 2019 11:00) (64/35 - 135/69)  BP(mean): 78 (19 Nov 2019 11:00) (45 - 95)  RR: 14 (19 Nov 2019 11:00) (12 - 20)  SpO2: 100% (19 Nov 2019 11:00) (95% - 100%)    I&O's Summary  18 Nov 2019 07:01  -  19 Nov 2019 07:00  --------------------------------------------------------  IN: 1837.6 mL / OUT: 985 mL / NET: 852.6 mL    19 Nov 2019 07:01  -  19 Nov 2019 11:54  --------------------------------------------------------  IN: 60.1 mL / OUT: 60 mL / NET: 0.1 mL    Physical Exam:  General: NAD  Pulmonary: Intubated, on vent  Cardiovascular: NSR  Abdominal: soft, non-distended  Extremities: WWP,  no clubbing/cyanosis/edema  Neuro: sedated    LABS:                     10.2   12.60 )-----------( 297      ( 19 Nov 2019 11:37 )             30.2     11-19  140  |  112<H>  |  29<H>  ----------------------------<  109<H>  3.9   |  20<L>  |  0.43<L>    Ca    7.5<L>      19 Nov 2019 05:07  Phos  3.6     11-19  Mg     1.7     11-19    TPro  5.0<L>  /  Alb  2.9<L>  /  TBili  0.3  /  DBili  x   /  AST  11  /  ALT  6<L>  /  AlkPhos  82  11-18    PT/INR - ( 19 Nov 2019 05:07 )   PT: 13.3 sec;   INR: 1.17     PTT - ( 19 Nov 2019 05:07 )  PTT:31.8 sec    LIVER FUNCTIONS - ( 18 Nov 2019 06:48 )  Alb: 2.9 g/dL / Pro: 5.0 g/dL / ALK PHOS: 82 U/L / ALT: 6 U/L / AST: 11 U/L / GGT: x           CAPILLARY BLOOD GLUCOSE  POCT Blood Glucose.: 91 mg/dL (19 Nov 2019 11:04)

## 2019-11-19 NOTE — PROGRESS NOTE ADULT - ASSESSMENT
68F with h/o aortic dissection - multiple repairs (20 years ago), GIB - Known Dieulafoy lesion of stomach/duodenum - recent splenic and left gastric arterial embolizations (11/7 at [facility]), spinal hematoma - paraplegia. baclofen pump in place RLQ Abd, nephrolithiasis w/retained ureteral stent (did not followup for removal), recurrent UTIs - known Hx ESBL organisms - required straight cath, HTN, PAD, and HSV endophthalmitis/keratitis, left corneal transplant admitted for recurrent GI bleed and UTI. Patient transferred back to MICU in setting of active upper GIB. Plan for gastrectomy with surgery.      Neurology  Sedated on Propofol, fentanyl    #Functional Paraplegia 2/2 to complications of aortic dissection   -c/w symptomatic management  -on baclofen 20 mg q12h, duloxetine 20 mg q12h, gabapentin 600 mg q8h  -on oxycodone prn for chronic pain  -pt has baclofen pump  -Baclofen withdrawal would present as: Hyperpyrexia, AMS, rebound spascitity, muscle rigidity, rhabdo, multiple organ failure. In the setting of these symptoms would suspect pump failure.       Cardiovascular  Found to be tachycardic to the 140s and hypotensive with BP 80s-90s/50 after having melanotic bowel movement. Hypertensive medication discontinued.   -Start Levophed if remains hypotensive in setting of active GIB and MAPs <65   -Holding hypertensive medications for now. Will resume when hemodynamically stable     #Thoracic aortic dissection   Vascular following and states that dissection extending into left common carotid artery is stable. No surgical intervention at this time.   - Will hold Labetalol 100 mg q12h, amlodipine 5 mg q24 in setting of active GI bleed with hemodynamic instability. Resume when appropriate.     #Hypertension  - Holding all hypertensive meds at this time in setting of active UGIB with hypotension (amlodipine 10mg q24h, labetalol 100 mg q12h)    #HLD  Continue Atorvastatin       Hematology   #Normocytic Anemia   Likely 2/2 to active UGIB bleed. Noted to have a drop in hgb from 9.3 to 8.3 after a melanotic BM. Ordered for 2u pRBCs.   -Transfuse 2u pRBCs, f/u CBC s/p transfusion   -CBC q4hr   -Transfuse for hgb <7      Respiratory   Intubated for airway protection for bedside EGD with GI       GI  Prior history of dieulafoy's lesion. Patient now noted to have melanotic stool with tachycardia and hypotension. She was noted to be somnolent with pallor. Stat CBC with hgb 8.3 down from 9.3 in the AM. 2u pRBCs ordered. GI fellow called and will perform bedside EGD. Prior EGD (11/12) was notable for old blood in the stomach with mod gastritis and a large gastric body mass and prior hemoclip. No source of bleeding was identified. Patient had been pending EUS for evaluation of dieulafoy lesion.   -Bedside EGD, GI was unable to localize lesion.   -Continue Protonix 40mg IB BID   - Hb stable at 10.2 s/p 4 units prbcs from last night  -Trend CBC q4h  -Transfuse of CBC <7  - Maintain active T&S and large bore IV      Renal   #UTI 2/2 to enterobacter aerogenes (Ucx). Currently on Ceftriaxone.   -Bcxs from 11/14 with NGTD, f/u   -Completed course of ceftriaxone     #Nephrolithiasis/Recurrent UTI   Nephrolithiasis w/retained left ureteral stent (did not followup for removal). Pt planned for laser lithotripsy & stent exchange on 11/7 but postponed given HD instability, urology aware and following.   -Urology following, appreciate recs     ID  UTI as above treating with Ceftriaxone (end date 11/19). Patient noted to spike fever of 101 in afternoon during same time that she became tachycardic, hypotensive and had melanotic BM. Bcxs drawn. Noted to have persistent Leukocytosis. Septic shock likely in setting of infection vs active GI bleed.   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

## 2019-11-19 NOTE — PROCEDURE NOTE - NSPOSTCAREGUIDE_GEN_A_CORE
Keep the cast/splint/dressing clean and dry/Care for catheter as per unit/ICU protocols
Care for catheter as per unit/ICU protocols

## 2019-11-19 NOTE — PROGRESS NOTE ADULT - SUBJECTIVE AND OBJECTIVE BOX
OVERNIGHT EVENTS:    SUBJECTIVE / INTERVAL HPI: Patient seen and examined at bedside.     VITAL SIGNS:  Vital Signs Last 24 Hrs  T(C): 36.4 (19 Nov 2019 06:03), Max: 38.2 (18 Nov 2019 17:00)  T(F): 97.6 (19 Nov 2019 06:03), Max: 100.7 (18 Nov 2019 17:00)  HR: 108 (19 Nov 2019 07:00) (74 - 142)  BP: 125/62 (19 Nov 2019 07:00) (64/35 - 135/69)  BP(mean): 87 (19 Nov 2019 07:00) (45 - 95)  RR: 14 (19 Nov 2019 07:00) (12 - 20)  SpO2: 100% (19 Nov 2019 07:00) (95% - 100%)    PHYSICAL EXAM:    General: WDWN  HEENT: NC/AT; PERRL, anicteric sclera; MMM  Neck: supple  Cardiovascular: +S1/S2; RRR  Respiratory: CTA B/L; no W/R/R  Gastrointestinal: soft, NT/ND; +BSx4  Extremities: WWP; no edema, clubbing or cyanosis  Vascular: 2+ radial, DP/PT pulses B/L  Neurological: AAOx3; no focal deficits    MEDICATIONS:  MEDICATIONS  (STANDING):  artificial  tears Solution 1 Drop(s) Both EYES two times a day  atorvastatin 40 milliGRAM(s) Oral at bedtime  baclofen 20 milliGRAM(s) Oral every 12 hours  Baclofen 480 MICROgram(s)/Day,Morphine 2.4 mG/Day 480 MICROGram(s) IntraThecal Continuous Pump  cefTRIAXone   IVPB 1000 milliGRAM(s) IV Intermittent every 24 hours  chlorhexidine 0.12% Liquid 15 milliLiter(s) Oral Mucosa every 12 hours  chlorhexidine 2% Cloths 1 Application(s) Topical daily  DULoxetine 20 milliGRAM(s) Oral every 12 hours  gabapentin 600 milliGRAM(s) Oral every 8 hours  norepinephrine Infusion 0.05 MICROgram(s)/kG/Min (5.156 mL/Hr) IV Continuous <Continuous>  pantoprazole  Injectable 40 milliGRAM(s) IV Push two times a day  polyethylene glycol 3350 17 Gram(s) Oral every 12 hours  prednisoLONE acetate 1% Suspension 1 Drop(s) Both EYES four times a day  propofol Infusion 5 MICROgram(s)/kG/Min (1.44 mL/Hr) IV Continuous <Continuous>  sodium chloride 0.9% lock flush 3 milliLiter(s) IV Push every 8 hours  sodium chloride 2% Ophthalmic Solution 1 Drop(s) Both EYES daily  valACYclovir 1000 milliGRAM(s) Oral three times a day    MEDICATIONS  (PRN):  acetaminophen   Tablet .. 650 milliGRAM(s) Oral every 6 hours PRN Mild Pain (1 - 3)  melatonin 3 milliGRAM(s) Oral at bedtime PRN Insomnia  oxycodone    5 mG/acetaminophen 325 mG 1 Tablet(s) Oral every 6 hours PRN Moderate Pain (4 - 6)  oxycodone    5 mG/acetaminophen 325 mG 2 Tablet(s) Oral every 6 hours PRN Severe Pain (7 - 10)      ALLERGIES:  Allergies    No Known Allergies    Intolerances        LABS:                        10.7   16.21 )-----------( 232      ( 19 Nov 2019 05:07 )             32.5     11-19    140  |  112<H>  |  29<H>  ----------------------------<  109<H>  3.9   |  20<L>  |  0.43<L>    Ca    7.5<L>      19 Nov 2019 05:07  Phos  3.6     11-19  Mg     1.7     11-19    TPro  5.0<L>  /  Alb  2.9<L>  /  TBili  0.3  /  DBili  x   /  AST  11  /  ALT  6<L>  /  AlkPhos  82  11-18    PT/INR - ( 19 Nov 2019 05:07 )   PT: 13.3 sec;   INR: 1.17          PTT - ( 19 Nov 2019 05:07 )  PTT:31.8 sec    CAPILLARY BLOOD GLUCOSE          RADIOLOGY & ADDITIONAL TESTS: Reviewed. OVERNIGHT EVENTS: Surgery recommended OR for gastrectomy today. Pt s/p 4 units prbcs with Hb 11.7 --> 10.3. Left A line was placed. Urine output decreased. No melena. Pt given 500 cc bolus w/ uop of 20-30/h after. Pt's vitals are stable.    SUBJECTIVE / INTERVAL HPI: Patient seen and examined at bedside. Pt intubated and sedated. Pt resting in bed and opens eyes to voice and sternal rub. Moves upper extremtities.    VITAL SIGNS:  Vital Signs Last 24 Hrs  T(C): 36.4 (19 Nov 2019 06:03), Max: 38.2 (18 Nov 2019 17:00)  T(F): 97.6 (19 Nov 2019 06:03), Max: 100.7 (18 Nov 2019 17:00)  HR: 108 (19 Nov 2019 07:00) (74 - 142)  BP: 125/62 (19 Nov 2019 07:00) (64/35 - 135/69)  BP(mean): 87 (19 Nov 2019 07:00) (45 - 95)  RR: 14 (19 Nov 2019 07:00) (12 - 20)  SpO2: 100% (19 Nov 2019 07:00) (95% - 100%)    PHYSICAL EXAM:    General: Pt intubated and sedated. Opens eyes to voice, moves upper extremities.   HEENT: NC/AT; PERRL, anicteric sclera; dry mucous membranes  Neck: supple  Cardiovascular: +S1/S2; RRR, no m/g/r  Respiratory: CTA B/L; no W/R/R  Gastrointestinal: soft, NT/ND; +BSx4  Extremities: WWP; no edema, clubbing or cyanosis  Vascular: 2+ radial, DP/PT pulses B/L  Neurological: Pt paraplegic. Intubated and sedated     MEDICATIONS:  MEDICATIONS  (STANDING):  artificial  tears Solution 1 Drop(s) Both EYES two times a day  atorvastatin 40 milliGRAM(s) Oral at bedtime  baclofen 20 milliGRAM(s) Oral every 12 hours  Baclofen 480 MICROgram(s)/Day,Morphine 2.4 mG/Day 480 MICROGram(s) IntraThecal Continuous Pump  cefTRIAXone   IVPB 1000 milliGRAM(s) IV Intermittent every 24 hours  chlorhexidine 0.12% Liquid 15 milliLiter(s) Oral Mucosa every 12 hours  chlorhexidine 2% Cloths 1 Application(s) Topical daily  DULoxetine 20 milliGRAM(s) Oral every 12 hours  gabapentin 600 milliGRAM(s) Oral every 8 hours  norepinephrine Infusion 0.05 MICROgram(s)/kG/Min (5.156 mL/Hr) IV Continuous <Continuous>  pantoprazole  Injectable 40 milliGRAM(s) IV Push two times a day  polyethylene glycol 3350 17 Gram(s) Oral every 12 hours  prednisoLONE acetate 1% Suspension 1 Drop(s) Both EYES four times a day  propofol Infusion 5 MICROgram(s)/kG/Min (1.44 mL/Hr) IV Continuous <Continuous>  sodium chloride 0.9% lock flush 3 milliLiter(s) IV Push every 8 hours  sodium chloride 2% Ophthalmic Solution 1 Drop(s) Both EYES daily  valACYclovir 1000 milliGRAM(s) Oral three times a day    MEDICATIONS  (PRN):  acetaminophen   Tablet .. 650 milliGRAM(s) Oral every 6 hours PRN Mild Pain (1 - 3)  melatonin 3 milliGRAM(s) Oral at bedtime PRN Insomnia  oxycodone    5 mG/acetaminophen 325 mG 1 Tablet(s) Oral every 6 hours PRN Moderate Pain (4 - 6)  oxycodone    5 mG/acetaminophen 325 mG 2 Tablet(s) Oral every 6 hours PRN Severe Pain (7 - 10)      ALLERGIES:  Allergies    No Known Allergies    Intolerances        LABS:                        10.7   16.21 )-----------( 232      ( 19 Nov 2019 05:07 )             32.5     11-19    140  |  112<H>  |  29<H>  ----------------------------<  109<H>  3.9   |  20<L>  |  0.43<L>    Ca    7.5<L>      19 Nov 2019 05:07  Phos  3.6     11-19  Mg     1.7     11-19    TPro  5.0<L>  /  Alb  2.9<L>  /  TBili  0.3  /  DBili  x   /  AST  11  /  ALT  6<L>  /  AlkPhos  82  11-18    PT/INR - ( 19 Nov 2019 05:07 )   PT: 13.3 sec;   INR: 1.17          PTT - ( 19 Nov 2019 05:07 )  PTT:31.8 sec    CAPILLARY BLOOD GLUCOSE          RADIOLOGY & ADDITIONAL TESTS: Reviewed.

## 2019-11-19 NOTE — PROGRESS NOTE ADULT - ASSESSMENT
68F with h/o aortic dissection s/p multiple repairs, spinal hematoma resulting in paraplegia (on baclofen pump), recurrent ESBL UTI, HTN, PAD, and HSV endophthalmitis/keratitis s/p left corneal transplant, multiple GIB w/ multiple endoscopic evaluation and required splenic and left gastric arterial embolization 11/7/19 at [facility] now return to [place] [facility] for AMS transferred to [facility] after found to have hypotension and anemia.    #Melena  Initially pt presented with acute drop in hgb following melenic stool with hemodynamic instability.  Bedside EGD 11/12 was notable for old blood in the stomach with mod gastritis and a large gastric body mass and prior hemoclips.  No source of bleeding was identified and no evidence of active bleeding.  Review of CTA without obvious finding to suggest UGIB.  Patient with another episode of melena with change in hemodynamics and drop in hgb 11/18/19. Repeat with push enteroscopy was notable for significant blood in the stomach but limited visualization due to large blood clots.  Towards the end of examination, pooling of blood decreased but source was not identified.   -Continue PPI BID IV  -Trend CBC   -Transfuse per primary team  -Maintain active T&S and large bore IV  -Surgery plan for total gastrectomy today     Case d/w svc attending

## 2019-11-20 NOTE — CONSULT NOTE ADULT - ASSESSMENT
68F with h/o aortic dissection - multiple repairs (20 years ago), GIB - Known Dieulafoy lesion of stomach/duodenum - recent splenic and left gastric arterial embolizations (11/7 at [facility]), spinal hematoma - paraplegia. baclofen pump in place RLQ Abd, nephrolithiasis w/retained ureteral stent (did not followup for removal), recurrent UTIs - known Hx ESBL organisms - required straight cath, HTN, PAD, and HSV endophthalmitis/keratitis, left corneal transplant admitted for recurrent GI bleed and UTI. POD0 s/p laparoscopic total gastrectomy with scar-en-y reconstruction and cholecystectomy.     Plan  Neuro: Fentanyl for pain. Propofol to RASS -1  CV: Not on pressors  Resp: Remain on VC-AC  GI: NPO, IVF  : Russo, monitor I&O  Haem: Post-op labs  DVT: No chemoprophylaxis today. SCD 68F with h/o aortic dissection - multiple repairs (20 years ago), GIB - Known Dieulafoy lesion of stomach/duodenum - recent splenic and left gastric arterial embolizations (11/7 at [facility]), spinal hematoma - paraplegia. baclofen pump in place RLQ Abd, nephrolithiasis w/retained ureteral stent (did not followup for removal), recurrent UTIs - known Hx ESBL organisms - required straight cath, HTN, PAD, and HSV endophthalmitis/keratitis, left corneal transplant admitted for recurrent GI bleed and UTI. POD0 s/p laparoscopic total gastrectomy with scar-en-y reconstruction and cholecystectomy.     Plan  Neuro: Fentanyl for pain. Propofol to RASS -1. Baclofen pump. On valtrex for HSV endophthalmitis  CV: Not on pressors. Levo if hypotensive, Hold all antihypertensives (Amlodipine 10mg q24hr, labetalol 100mg q12hr)  Resp: Remain on VC-AC, daily CXR  GI: NPO, IVF, PPI  : Russo, monitor I&O. On ceftriaxone for UTI 2/2 enterobacter aerogenes.   Haem: Post-op labs. Repeat transfuse Hb <7.  DVT: No chemoprophylaxis today. SCD  Lines: Left axillary A line, Right PIV

## 2019-11-20 NOTE — PROGRESS NOTE ADULT - ASSESSMENT
68F with h/o aortic dissection s/p multiple repairs, spinal hematoma resulting in paraplegia (on baclofen pump), recurrent ESBL UTI, HTN, PAD, and HSV endophthalmitis/keratitis s/p left corneal transplant, multiple GIB w/ multiple endoscopic evaluation and required splenic and left gastric arterial embolization 11/7/19 at [facility] now return to [place] [facility] for AMS transferred to [facility] after found to have hypotension and anemia s/p EGD and push enteroscopy without identifiable source now s/p lap subtotal gastrectomy and cholecystectomy.    #Melena  Initially, pt presented with acute drop in hgb following melenic stool with hemodynamic instability.  Bedside EGD 11/12 was notable for old blood in the stomach with mod gastritis and a large gastric body mass and prior hemoclips.  No source of bleeding was identified and no evidence of active bleeding.  Review of CTA without obvious finding to suggest UGIB.  Patient with another episode of melena with change in hemodynamics and drop in hgb 11/18/19. Repeat with push enteroscopy was notable for significant blood in the stomach but limited visualization due to large blood clots.  Towards the end of examination, pooling of blood decreased but source was not identified.   S/P lap subtotal gastrectomy and cholecystectomy.  -Continue PPI BID for 8 weeks  -Trend CBC   -Transfuse per primary team  -Maintain active T&S and large bore IV    Please notify on call GI fellow if patient develops bleeding or change in hemodynamics    Case d/w svc attending

## 2019-11-20 NOTE — PROGRESS NOTE ADULT - ASSESSMENT
68F with h/o aortic dissection - multiple repairs (20 years ago), GIB - Known Dieulafoy lesion of stomach/duodenum - recent splenic and left gastric arterial embolizations (11/7 at [facility]), spinal hematoma - paraplegia. baclofen pump in place RLQ Abd, nephrolithiasis w/retained ureteral stent (did not followup for removal), recurrent UTIs - known Hx ESBL organisms - required straight cath, HTN, PAD, and HSV endophthalmitis/keratitis, left corneal transplant admitted for recurrent GI bleed and UTI. Patient transferred back to MICU in setting of active upper GIB. Plan for gastrectomy with surgery.    GI  Prior history of dieulafoy's lesion. Patient with melanotic stool with tachycardia and hypotension. She was noted to be somnolent with pallor. Hgb 8.3 --> 10.7 s/p 4u pRBCs. GI fellow called and will perform bedside EGD showing large volume of blood in stomach with clots, unable to localize bleed. Prior EGD (11/12) was notable for old blood in the stomach with mod gastritis and a large gastric body mass and prior hemoclip. No source of bleeding was identified. Patient had been pending EUS for evaluation of dieulafoy lesion.   - Bedside EGD, GI was unable to localize lesion.   - Continue Protonix 40mg IB BID   - Hb steadily downtrending to 8.7 s/p 4 prbcs   - Trend CBC q4h  - Transfuse for CBC <7  - Maintain active T&S and large bore IV  - Plan for total gastrectomy this AM     Renal   #UTI 2/2 to enterobacter aerogenes (Ucx). Currently on Ceftriaxone.   -Bcxs from 11/14 with NGTD, f/u   -Completed course of ceftriaxone     #Nephrolithiasis/Recurrent UTI   Nephrolithiasis w/retained left ureteral stent (did not followup for removal). Pt planned for laser lithotripsy & stent exchange on 11/7 but postponed given HD instability, urology aware and following.   -Urology following, appreciate recs     ID  UTI as above treating with Ceftriaxone (end date 11/19). Patient noted to spike fever of 101 in afternoon during same time that she became tachycardic, hypotensive and had melanotic BM. Bcxs drawn. Noted to have persistent Leukocytosis. Septic shock likely in setting of infection vs active GI bleed.   -completed ceftriaxone 11/19  -F/u Bcxs     Neurology  Sedated on Propofol, fentanyl. Arouses to voice. Moves upper extremities.    #Functional Paraplegia 2/2 to complications of aortic dissection   -c/w symptomatic management  -on baclofen 20 mg q12h, duloxetine 20 mg q12h, gabapentin 600 mg q8h  -on oxycodone prn for chronic pain  -pt has baclofen pump  -Baclofen withdrawal would present as: Hyperpyrexia, AMS, rebound spascitity, muscle rigidity, rhabdo, multiple organ failure. In the setting of these symptoms would suspect pump failure.       Cardiovascular  Found to be tachycardic to the 140s and hypotensive with BP 80s-90s/50 after having melanotic bowel movement. Hypertensive medication discontinued.   -Start Levophed if remains hypotensive in setting of active GIB and MAPs <65   -Holding hypertensive medications for now. Can resume when hemodynamically stable     #Thoracic aortic dissection   Vascular following and states that dissection extending into left common carotid artery is stable. No surgical intervention at this time.   - Holding Labetalol 100 mg q12h, amlodipine 5 mg q24 in setting of active GI bleed with hemodynamic instability. Resume when appropriate.     #Hypertension  - Held all hypertensive meds at this time in setting of active UGIB with hypotension (amlodipine 10mg q24h, labetalol 100 mg q12h)    #HLD  Continue Atorvastatin       Hematology   #Normocytic Anemia   Likely 2/2 to active UGIB bleed. Noted to have a drop in hgb from 9.3 to 8.3 after a melanotic BM.   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 baclofen pump in place RLQ Abd, nephrolithiasis w/retained ureteral stent (did not followup for removal), recurrent UTIs - known Hx ESBL organisms - required straight cath, HTN, PAD, and HSV endophthalmitis/keratitis, left corneal transplant admitted for recurrent GI bleed and UTI. Patient transferred back to MICU in setting of active upper GIB. Plan for gastrectomy with surgery.    GI  Prior history of dieulafoy's lesion. Patient with melanotic stool with tachycardia and hypotension. She was noted to be somnolent with pallor. (11/18) Hgb 8.3 --> 10.7 s/p 4u pRBCs. Bedside EGD showed large volume of blood in stomach with clots, unable to localize bleed. Prior EGD (11/12) was notable for old blood in the stomach with mod gastritis and a large gastric body mass and prior hemoclip. No source of bleeding was identified. Patient had been pending EUS for evaluation of dieulafoy lesion.   - Bedside EGD, GI was unable to localize lesion.   - Continue Protonix 40mg IB BID   - Hb steadily downtrended to 8.7 s/p 4 prbcs   - Trend CBC q4h  - Transfuse for CBC <7  - Maintain active T&S and large bore IV  - Plan for total gastrectomy this AM     Renal   #UTI 2/2 to enterobacter aerogenes (Ucx).    -Bcxs from 11/14 with NGTD,   -Completed course of ceftriaxone     #Nephrolithiasis/Recurrent UTI   Nephrolithiasis w/retained left ureteral stent (did not followup for removal). Pt planned for laser lithotripsy & stent exchange on 11/7 but postponed given HD instability, urology aware and following.   -Urology following, appreciate recs     ID  UTI as above.    -completed ceftriaxone 11/19  -F/u Bcxs     Neurology  Sedated on Propofol, fentanyl. Arouses to voice. Moves upper extremities.    #Functional Paraplegia 2/2 to complications of aortic dissection   -c/w symptomatic management  -on baclofen 20 mg q12h, duloxetine 20 mg q12h, gabapentin 600 mg q8h  -on oxycodone prn for chronic pain  -pt has baclofen pump  -Baclofen withdrawal would present as: Hyperpyrexia, AMS, rebound spascitity, muscle rigidity, rhabdo, multiple organ failure. In the setting of these symptoms would suspect pump failure.       Cardiovascular  Found to be Tachycardic to the 140s and hypotensive with BP 80s-90s/50 after having melanotic bowel movement. Hypertensive medication discontinued.   - resolved  - Held hypertensive medications. Can resume when hemodynamically stable     #Thoracic aortic dissection   Vascular following and states that dissection extending into left common carotid artery is stable. No surgical intervention at this time.   - Held Labetalol 100 mg q12h, amlodipine 5 mg q24 in setting of active GI bleed with hemodynamic instability. Resume when appropriate.     #Hypertension  - Held all hypertensive meds at this time in setting of active UGIB with hypotension (amlodipine 10mg q24h, labetalol 100 mg q12h)    #HLD  Continue Atorvastatin       Hematology   #Normocytic Anemia   Likely 2/2 to active UGIB bleed.    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), spinal hematoma - paraplegia. baclofen pump in place RLQ Abd, nephrolithiasis w/retained ureteral stent (did not followup for removal), recurrent UTIs - known Hx ESBL organisms - required straight cath, HTN, PAD, and HSV endophthalmitis/keratitis, left corneal transplant admitted for recurrent GI bleed and UTI. Patient transferred back to MICU in setting of active upper GIB. Plan for gastrectomy with surgery.    GI  Prior history of dieulafoy's lesion. Patient with melanotic stool with tachycardia and hypotension. She was noted to be somnolent with pallor. (11/18) Hgb 8.3 --> 10.7 s/p 4u pRBCs. Bedside EGD showed large volume of blood in stomach with clots, unable to localize bleed. Prior EGD (11/12) was notable for old blood in the stomach with mod gastritis and a large gastric body mass and prior hemoclip. No source of bleeding was identified. Patient had been pending EUS for evaluation of dieulafoy lesion.   - Bedside EGD, GI was unable to localize lesion.   - Continue Protonix 40mg IB BID   - Hb steadily downtrended to 8.7 s/p 4 prbcs   - Trend CBC q4h  - Transfuse for CBC <7  - Maintain active T&S and large bore IV  - Plan for total gastrectomy this AM     Renal   #UTI 2/2 to enterobacter aerogenes (Ucx).    -Bcxs from 11/14 with NGTD,   -Completed course of ceftriaxone     #Nephrolithiasis/Recurrent UTI   Nephrolithiasis w/retained left ureteral stent (did not followup for removal). Pt planned for laser lithotripsy & stent exchange on 11/7 but postponed given HD instability, urology aware and following.   -Urology following, appreciate recs     ID  UTI as above.    -completed ceftriaxone 11/19  -F/u Bcxs     Neurology  Currently sedated on Propofol, fentanyl. Arouses to voice. Moves upper extremities.    #Altered mental status 2/2 metabolic encephalopathy  - resolved    #Functional Paraplegia 2/2 to complications of aortic dissection   -c/w symptomatic management  -on baclofen 20 mg q12h, duloxetine 20 mg q12h, gabapentin 600 mg q8h  -on oxycodone prn for chronic pain  -pt has baclofen pump  -Baclofen withdrawal would present as: Hyperpyrexia, AMS, rebound spascitity, muscle rigidity, rhabdo, multiple organ failure. In the setting of these symptoms would suspect pump failure.       Cardiovascular  Found to be Tachycardic to the 140s and hypotensive with BP 80s-90s/50 after having melanotic bowel movement. Hypertensive medication discontinued.   - resolved  - Held hypertensive medications. Can resume when hemodynamically stable     #Thoracic aortic dissection   Vascular following and states that dissection extending into left common carotid artery is stable. No surgical intervention at this time.   - Held Labetalol 100 mg q12h, amlodipine 5 mg q24 in setting of active GI bleed with hemodynamic instability. Resume when appropriate.     #Hypertension  - Held all hypertensive meds at this time in setting of active UGIB with hypotension (amlodipine 10mg q24h, labetalol 100 mg q12h)    #HLD  Continue Atorvastatin       Hematology   #Normocytic Anemia   Likely 2/2 to active UGIB bleed.    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

## 2019-11-20 NOTE — PROGRESS NOTE ADULT - SUBJECTIVE AND OBJECTIVE BOX
OVERNIGHT EVENTS:    SUBJECTIVE / INTERVAL HPI: Patient seen and examined at bedside.     VITAL SIGNS:  Vital Signs Last 24 Hrs  T(C): 36.2 (20 Nov 2019 06:00), Max: 37.9 (19 Nov 2019 09:17)  T(F): 97.2 (20 Nov 2019 06:00), Max: 100.2 (19 Nov 2019 09:17)  HR: 74 (20 Nov 2019 06:00) (72 - 120)  BP: 112/54 (19 Nov 2019 20:00) (94/48 - 125/68)  BP(mean): 78 (19 Nov 2019 20:00) (67 - 90)  RR: 13 (20 Nov 2019 06:00) (13 - 26)  SpO2: 100% (20 Nov 2019 06:00) (98% - 100%)    PHYSICAL EXAM:    General: WDWN  HEENT: NC/AT; PERRL, anicteric sclera; MMM  Neck: supple  Cardiovascular: +S1/S2; RRR  Respiratory: CTA B/L; no W/R/R  Gastrointestinal: soft, NT/ND; +BSx4  Extremities: WWP; no edema, clubbing or cyanosis  Vascular: 2+ radial, DP/PT pulses B/L  Neurological: AAOx3; no focal deficits    MEDICATIONS:  MEDICATIONS  (STANDING):  artificial  tears Solution 1 Drop(s) Both EYES two times a day  atorvastatin 40 milliGRAM(s) Oral at bedtime  baclofen 20 milliGRAM(s) Oral every 12 hours  Baclofen 480 MICROgram(s)/Day,Morphine 2.4 mG/Day 480 MICROGram(s) IntraThecal Continuous Pump  chlorhexidine 0.12% Liquid 15 milliLiter(s) Oral Mucosa every 12 hours  chlorhexidine 2% Cloths 1 Application(s) Topical daily  DULoxetine 20 milliGRAM(s) Oral every 12 hours  fentaNYL   Infusion. 0.5 MICROgram(s)/kG/Hr (2.75 mL/Hr) IV Continuous <Continuous>  gabapentin 600 milliGRAM(s) Oral every 8 hours  pantoprazole  Injectable 40 milliGRAM(s) IV Push two times a day  prednisoLONE acetate 1% Suspension 1 Drop(s) Both EYES four times a day  propofol Infusion 5 MICROgram(s)/kG/Min (1.65 mL/Hr) IV Continuous <Continuous>  sodium chloride 0.9% lock flush 3 milliLiter(s) IV Push every 8 hours  sodium chloride 2% Ophthalmic Solution 1 Drop(s) Both EYES daily  valACYclovir 1000 milliGRAM(s) Oral three times a day    MEDICATIONS  (PRN):      ALLERGIES:  Allergies    No Known Allergies    Intolerances        LABS:                        8.7    9.00  )-----------( 331      ( 20 Nov 2019 04:09 )             26.2     11-20    140  |  112<H>  |  31<H>  ----------------------------<  81  4.2   |  21<L>  |  0.43<L>    Ca    8.0<L>      20 Nov 2019 04:09  Phos  2.6     11-20  Mg     1.9     11-20    TPro  5.0<L>  /  Alb  2.9<L>  /  TBili  0.3  /  DBili  x   /  AST  11  /  ALT  6<L>  /  AlkPhos  82  11-18    PT/INR - ( 20 Nov 2019 04:09 )   PT: 12.1 sec;   INR: 1.07          PTT - ( 20 Nov 2019 04:09 )  PTT:31.3 sec    CAPILLARY BLOOD GLUCOSE      POCT Blood Glucose.: 91 mg/dL (19 Nov 2019 11:04)      RADIOLOGY & ADDITIONAL TESTS: Reviewed. HOSPITAL COURSE:  Pt is 68F with h/o aortic dissection - multiple repairs (20 years ago), GIB - possible Dieulafoy lesion of stomach/duodenum - recent splenic and left gastric arterial embolizations (11/7 at [facility]), spinal hematoma - paraplegia - baclofen pump in place RLQ Abd, nephrolithiasis w/ retained ureteral stent (due for stent exchange w/ urology), recurrent UTIs - known Hx ESBL organisms - requires self straight cath, HTN, PAD, and HSV endophthalmitis/keratitis, left corneal transplant initially presenting w/ AMS, hypotension, now admitted to [facility] for recurrent GI bleed and UTI. Pt found to have hypovolemic/septic shock 2/2 to upper GI bleed and UTI. Pt has since received 3u pRBC and was started meropenem for suspected ESBL UTI. CTA chest/abdomen was performed, aortic-entero fistula was ruled out. 11/13 Bedside endoscope done by GI showed old blood in the stomach with moderate gastritis and a large gastric body mass and prior hemoclip.  No source of bleeding was identified and no evidence of active bleeding. Plan for EUS by GI. Urology also consulted for pt for left ureteral stent. Pt was transferred from CT surgery to MICU for further management. Pt without melanotic bleeding and stable hemodynamics, stepped down to  lac. She was pending a EUS which was deferred Friday, Saturday, Sunday, and Monday 11/15-11/18. On 11/18 she became tachycardic to the 140s, SBP of 80s-90s/50s, was noted to have a melanotic stool, the GI fellow was called, and she was noted to have increasing somnolence while remaining AAOx3 with worsening pallor. CBC, BCx x2, T&S were drawn. CBC w/Hgb 8.3, down from 9.3 in the AM. Her  was called and she was consented for EGD w/ push enteroscopy by the GI fellow & for a blood transfusion by the 7 [facility] resident. D/c'ed antihypertensives. Pt transferred to MICU.  EGD at bed sideshowed large volume of blood with blood clots in the stomach with no localized source of bleeding.  Pt s/p 4 units of pRBCs w/ Hb remaining stable. Since pt had recent embolization, there is limited role for another embolization per IR. Pt planned for total gastrectomy. Gastrectomy performed 11/20. Pt transferred to SICU for post-op management.    OVERNIGHT EVENTS:    SUBJECTIVE / INTERVAL HPI: Patient seen and examined at bedside.     VITAL SIGNS:  Vital Signs Last 24 Hrs  T(C): 36.2 (20 Nov 2019 06:00), Max: 37.9 (19 Nov 2019 09:17)  T(F): 97.2 (20 Nov 2019 06:00), Max: 100.2 (19 Nov 2019 09:17)  HR: 74 (20 Nov 2019 06:00) (72 - 120)  BP: 112/54 (19 Nov 2019 20:00) (94/48 - 125/68)  BP(mean): 78 (19 Nov 2019 20:00) (67 - 90)  RR: 13 (20 Nov 2019 06:00) (13 - 26)  SpO2: 100% (20 Nov 2019 06:00) (98% - 100%)    PHYSICAL EXAM:    General: WDWN  HEENT: NC/AT; PERRL, anicteric sclera; MMM  Neck: supple  Cardiovascular: +S1/S2; RRR  Respiratory: CTA B/L; no W/R/R  Gastrointestinal: soft, NT/ND; +BSx4  Extremities: WWP; no edema, clubbing or cyanosis  Vascular: 2+ radial, DP/PT pulses B/L  Neurological: AAOx3; no focal deficits    MEDICATIONS:  MEDICATIONS  (STANDING):  artificial  tears Solution 1 Drop(s) Both EYES two times a day  atorvastatin 40 milliGRAM(s) Oral at bedtime  baclofen 20 milliGRAM(s) Oral every 12 hours  Baclofen 480 MICROgram(s)/Day,Morphine 2.4 mG/Day 480 MICROGram(s) IntraThecal Continuous Pump  chlorhexidine 0.12% Liquid 15 milliLiter(s) Oral Mucosa every 12 hours  chlorhexidine 2% Cloths 1 Application(s) Topical daily  DULoxetine 20 milliGRAM(s) Oral every 12 hours  fentaNYL   Infusion. 0.5 MICROgram(s)/kG/Hr (2.75 mL/Hr) IV Continuous <Continuous>  gabapentin 600 milliGRAM(s) Oral every 8 hours  pantoprazole  Injectable 40 milliGRAM(s) IV Push two times a day  prednisoLONE acetate 1% Suspension 1 Drop(s) Both EYES four times a day  propofol Infusion 5 MICROgram(s)/kG/Min (1.65 mL/Hr) IV Continuous <Continuous>  sodium chloride 0.9% lock flush 3 milliLiter(s) IV Push every 8 hours  sodium chloride 2% Ophthalmic Solution 1 Drop(s) Both EYES daily  valACYclovir 1000 milliGRAM(s) Oral three times a day    MEDICATIONS  (PRN):      ALLERGIES:  Allergies    No Known Allergies    Intolerances        LABS:                        8.7    9.00  )-----------( 331      ( 20 Nov 2019 04:09 )             26.2     11-20    140  |  112<H>  |  31<H>  ----------------------------<  81  4.2   |  21<L>  |  0.43<L>    Ca    8.0<L>      20 Nov 2019 04:09  Phos  2.6     11-20  Mg     1.9     11-20    TPro  5.0<L>  /  Alb  2.9<L>  /  TBili  0.3  /  DBili  x   /  AST  11  /  ALT  6<L>  /  AlkPhos  82  11-18    PT/INR - ( 20 Nov 2019 04:09 )   PT: 12.1 sec;   INR: 1.07          PTT - ( 20 Nov 2019 04:09 )  PTT:31.3 sec    CAPILLARY BLOOD GLUCOSE      POCT Blood Glucose.: 91 mg/dL (19 Nov 2019 11:04)      RADIOLOGY & ADDITIONAL TESTS: Reviewed. HOSPITAL COURSE:  Pt is 68F with h/o aortic dissection - multiple repairs (20 years ago), GIB - possible Dieulafoy lesion of stomach/duodenum - recent splenic and left gastric arterial embolizations (11/7 at [facility]), spinal hematoma - paraplegia - baclofen pump in place RLQ Abd, nephrolithiasis w/ retained ureteral stent (due for stent exchange w/ urology), recurrent UTIs - known Hx ESBL organisms - requires self straight cath, HTN, PAD, and HSV endophthalmitis/keratitis, left corneal transplant initially presenting w/ AMS, hypotension, now admitted to [facility] for recurrent GI bleed and UTI. Pt found to have hypovolemic/septic shock 2/2 to upper GI bleed and UTI. Pt has since received 3u pRBC and was started meropenem for suspected ESBL UTI. CTA chest/abdomen was performed, aortic-entero fistula was ruled out. 11/13 Bedside endoscope done by GI showed old blood in the stomach with moderate gastritis and a large gastric body mass and prior hemoclip.  No source of bleeding was identified and no evidence of active bleeding. Plan for EUS by GI. Urology also consulted for pt for left ureteral stent. Pt was transferred from CT surgery to MICU for further management. Pt without melanotic bleeding and stable hemodynamics, stepped down to  lac. She was pending a EUS which was deferred Friday, Saturday, Sunday, and Monday 11/15-11/18. On 11/18 she became tachycardic to the 140s, SBP of 80s-90s/50s, was noted to have a melanotic stool, the GI fellow was called, and she was noted to have increasing somnolence while remaining AAOx3 with worsening pallor. CBC, BCx x2, T&S were drawn. CBC w/Hgb 8.3, down from 9.3 in the AM. Her  was called and she was consented for EGD w/ push enteroscopy by the GI fellow & for a blood transfusion by the 7 [facility] resident. D/c'ed antihypertensives. Pt transferred to MICU.  EGD at bed sideshowed large volume of blood with blood clots in the stomach with no localized source of bleeding.  Pt s/p 4 units of pRBCs w/ Hb remaining stable. Since pt had recent embolization, there is limited role for another embolization per IR. Pt planned for total gastrectomy. Gastrectomy performed 11/20. Pt transferred to SICU for post-op management.    OVERNIGHT EVENTS: Midnight Hb down to 9.0 from 9.5. SBPs remained 90-100s. Not tachycardiac. Pt with uop with 30-40  cc/h    SUBJECTIVE / INTERVAL HPI: Patient seen and examined at bedside. Pt intubated and sedated. Pt arouses to voice. Pt endorses back pain.     VITAL SIGNS:  Vital Signs Last 24 Hrs  T(C): 36.2 (20 Nov 2019 06:00), Max: 37.9 (19 Nov 2019 09:17)  T(F): 97.2 (20 Nov 2019 06:00), Max: 100.2 (19 Nov 2019 09:17)  HR: 74 (20 Nov 2019 06:00) (72 - 120)  BP: 112/54 (19 Nov 2019 20:00) (94/48 - 125/68)  BP(mean): 78 (19 Nov 2019 20:00) (67 - 90)  RR: 13 (20 Nov 2019 06:00) (13 - 26)  SpO2: 100% (20 Nov 2019 06:00) (98% - 100%)    PHYSICAL EXAM:    General: intubated, sedated. lying in bed. Arouses to voice. Able to move upper extremtities  HEENT: NC/AT; PERRL, anicteric sclera; dry mmm  Neck: supple  Cardiovascular: +S1/S2; RRR no m/g/r  Respiratory: CTA B/L; no W/R/R  Gastrointestinal: soft, NT/ND; +BSx4. Baclofen pump palpated in RLQ  Extremities: WWP; no edema, clubbing or cyanosis  Vascular: 2+ radial, DP/PT pulses B/L  Neurological: intubated and sedated; baseline paraplegic    MEDICATIONS:  MEDICATIONS  (STANDING):  artificial  tears Solution 1 Drop(s) Both EYES two times a day  atorvastatin 40 milliGRAM(s) Oral at bedtime  baclofen 20 milliGRAM(s) Oral every 12 hours  Baclofen 480 MICROgram(s)/Day,Morphine 2.4 mG/Day 480 MICROGram(s) IntraThecal Continuous Pump  chlorhexidine 0.12% Liquid 15 milliLiter(s) Oral Mucosa every 12 hours  chlorhexidine 2% Cloths 1 Application(s) Topical daily  DULoxetine 20 milliGRAM(s) Oral every 12 hours  fentaNYL   Infusion. 0.5 MICROgram(s)/kG/Hr (2.75 mL/Hr) IV Continuous <Continuous>  gabapentin 600 milliGRAM(s) Oral every 8 hours  pantoprazole  Injectable 40 milliGRAM(s) IV Push two times a day  prednisoLONE acetate 1% Suspension 1 Drop(s) Both EYES four times a day  propofol Infusion 5 MICROgram(s)/kG/Min (1.65 mL/Hr) IV Continuous <Continuous>  sodium chloride 0.9% lock flush 3 milliLiter(s) IV Push every 8 hours  sodium chloride 2% Ophthalmic Solution 1 Drop(s) Both EYES daily  valACYclovir 1000 milliGRAM(s) Oral three times a day    MEDICATIONS  (PRN):      ALLERGIES:  Allergies    No Known Allergies    Intolerances        LABS:                        8.7    9.00  )-----------( 331      ( 20 Nov 2019 04:09 )             26.2     11-20    140  |  112<H>  |  31<H>  ----------------------------<  81  4.2   |  21<L>  |  0.43<L>    Ca    8.0<L>      20 Nov 2019 04:09  Phos  2.6     11-20  Mg     1.9     11-20    TPro  5.0<L>  /  Alb  2.9<L>  /  TBili  0.3  /  DBili  x   /  AST  11  /  ALT  6<L>  /  AlkPhos  82  11-18    PT/INR - ( 20 Nov 2019 04:09 )   PT: 12.1 sec;   INR: 1.07          PTT - ( 20 Nov 2019 04:09 )  PTT:31.3 sec    CAPILLARY BLOOD GLUCOSE      POCT Blood Glucose.: 91 mg/dL (19 Nov 2019 11:04)      RADIOLOGY & ADDITIONAL TESTS: Reviewed.

## 2019-11-20 NOTE — PROGRESS NOTE ADULT - SUBJECTIVE AND OBJECTIVE BOX
Pt seen and examined at bedside.  Denies fever, chill, chest pain, shortness of breath, abdominal or epigastric pain, nausea, vomiting, hematemesis, diarrhea, constipation, melena, or hematochezia.     Allergies    No Known Allergies    Intolerances      MEDICATIONS:  MEDICATIONS  (STANDING):  acyclovir IVPB 250 milliGRAM(s) IV Intermittent every 12 hours  artificial  tears Solution 1 Drop(s) Both EYES two times a day  Baclofen 480 MICROgram(s)/Day,Morphine 2.4 mG/Day 480 MICROGram(s) IntraThecal Continuous Pump  chlorhexidine 0.12% Liquid 15 milliLiter(s) Oral Mucosa every 12 hours  chlorhexidine 2% Cloths 1 Application(s) Topical daily  fentaNYL   Infusion. 0.5 MICROgram(s)/kG/Hr (2.75 mL/Hr) IV Continuous <Continuous>  lactated ringers. 1000 milliLiter(s) (75 mL/Hr) IV Continuous <Continuous>  LORazepam   Injectable 1 milliGRAM(s) IV Push every 6 hours  pantoprazole  Injectable 40 milliGRAM(s) IV Push two times a day  prednisoLONE acetate 1% Suspension 1 Drop(s) Both EYES four times a day  propofol Infusion 5 MICROgram(s)/kG/Min (1.65 mL/Hr) IV Continuous <Continuous>  sodium chloride 0.9% lock flush 3 milliLiter(s) IV Push every 8 hours  sodium chloride 2% Ophthalmic Solution 1 Drop(s) Both EYES daily    MEDICATIONS  (PRN):    Vital Signs Last 24 Hrs  T(C): 36.6 (20 Nov 2019 13:00), Max: 37.4 (19 Nov 2019 17:40)  T(F): 97.9 (20 Nov 2019 13:00), Max: 99.3 (19 Nov 2019 17:40)  HR: 92 (20 Nov 2019 16:00) (72 - 96)  BP: 134/73 (20 Nov 2019 12:35) (94/48 - 134/73)  BP(mean): 101 (20 Nov 2019 12:35) (67 - 101)  RR: 14 (20 Nov 2019 16:00) (13 - 18)  SpO2: 100% (20 Nov 2019 16:00) (98% - 100%)    11-19 @ 07:01  -  11-20 @ 07:00  --------------------------------------------------------  IN: 729.4 mL / OUT: 780 mL / NET: -50.6 mL    11-20 @ 07:01  -  11-20 @ 16:28  --------------------------------------------------------  IN: 429.7 mL / OUT: 218 mL / NET: 211.7 mL      PHYSICAL EXAM:    General: Elderly female laying in bed, intubated  HEENT: MMM, conjunctiva and sclera clear  Gastrointestinal: Soft non-tender distended; Normal bowel sounds; No rebound or guarding  Skin: Warm, +pallor    LABS:                        10.0   14.78 )-----------( 360      ( 20 Nov 2019 13:44 )             31.4     11-20    143  |  115<H>  |  26<H>  ----------------------------<  82  3.8   |  19<L>  |  0.41<L>    Ca    8.3<L>      20 Nov 2019 13:44  Phos  3.0     11-20  Mg     1.8     11-20    TPro  4.0<L>  /  Alb  2.0<L>  /  TBili  0.4  /  DBili  x   /  AST  37  /  ALT  13  /  AlkPhos  72  11-20    PT/INR - ( 20 Nov 2019 13:44 )   PT: 11.9 sec;   INR: 1.05          PTT - ( 20 Nov 2019 13:44 )  PTT:32.0 sec      Culture - Blood (collected 18 Nov 2019 18:55)  Source: .Blood Blood  Preliminary Report (19 Nov 2019 19:01):    No growth at 1 day. Pt seen and examined at bedside in AM.  Patient was intubated and sedated.  No further stooling this morning per nursing.      Allergies    No Known Allergies    Intolerances      MEDICATIONS:  MEDICATIONS  (STANDING):  acyclovir IVPB 250 milliGRAM(s) IV Intermittent every 12 hours  artificial  tears Solution 1 Drop(s) Both EYES two times a day  Baclofen 480 MICROgram(s)/Day,Morphine 2.4 mG/Day 480 MICROGram(s) IntraThecal Continuous Pump  chlorhexidine 0.12% Liquid 15 milliLiter(s) Oral Mucosa every 12 hours  chlorhexidine 2% Cloths 1 Application(s) Topical daily  fentaNYL   Infusion. 0.5 MICROgram(s)/kG/Hr (2.75 mL/Hr) IV Continuous <Continuous>  lactated ringers. 1000 milliLiter(s) (75 mL/Hr) IV Continuous <Continuous>  LORazepam   Injectable 1 milliGRAM(s) IV Push every 6 hours  pantoprazole  Injectable 40 milliGRAM(s) IV Push two times a day  prednisoLONE acetate 1% Suspension 1 Drop(s) Both EYES four times a day  propofol Infusion 5 MICROgram(s)/kG/Min (1.65 mL/Hr) IV Continuous <Continuous>  sodium chloride 0.9% lock flush 3 milliLiter(s) IV Push every 8 hours  sodium chloride 2% Ophthalmic Solution 1 Drop(s) Both EYES daily    MEDICATIONS  (PRN):    Vital Signs Last 24 Hrs  T(C): 36.6 (20 Nov 2019 13:00), Max: 37.4 (19 Nov 2019 17:40)  T(F): 97.9 (20 Nov 2019 13:00), Max: 99.3 (19 Nov 2019 17:40)  HR: 92 (20 Nov 2019 16:00) (72 - 96)  BP: 134/73 (20 Nov 2019 12:35) (94/48 - 134/73)  BP(mean): 101 (20 Nov 2019 12:35) (67 - 101)  RR: 14 (20 Nov 2019 16:00) (13 - 18)  SpO2: 100% (20 Nov 2019 16:00) (98% - 100%)    11-19 @ 07:01  -  11-20 @ 07:00  --------------------------------------------------------  IN: 729.4 mL / OUT: 780 mL / NET: -50.6 mL    11-20 @ 07:01  -  11-20 @ 16:28  --------------------------------------------------------  IN: 429.7 mL / OUT: 218 mL / NET: 211.7 mL      PHYSICAL EXAM:    General: Elderly female laying in bed, intubated  HEENT: MMM, conjunctiva and sclera clear  Gastrointestinal: Soft non-tender distended; Normal bowel sounds; No rebound or guarding  Skin: Warm, +pallor    LABS:                        10.0   14.78 )-----------( 360      ( 20 Nov 2019 13:44 )             31.4     11-20    143  |  115<H>  |  26<H>  ----------------------------<  82  3.8   |  19<L>  |  0.41<L>    Ca    8.3<L>      20 Nov 2019 13:44  Phos  3.0     11-20  Mg     1.8     11-20    TPro  4.0<L>  /  Alb  2.0<L>  /  TBili  0.4  /  DBili  x   /  AST  37  /  ALT  13  /  AlkPhos  72  11-20    PT/INR - ( 20 Nov 2019 13:44 )   PT: 11.9 sec;   INR: 1.05          PTT - ( 20 Nov 2019 13:44 )  PTT:32.0 sec      Culture - Blood (collected 18 Nov 2019 18:55)  Source: .Blood Blood  Preliminary Report (19 Nov 2019 19:01):    No growth at 1 day.

## 2019-11-20 NOTE — CONSULT NOTE ADULT - SUBJECTIVE AND OBJECTIVE BOX
Pt is 68F with h/o aortic dissection - multiple repairs (20 years ago), GIB - possible Dieulafoy lesion of stomach/duodenum - recent splenic and left gastric arterial embolizations ( at [facility]), spinal hematoma - paraplegia - baclofen pump in place RLQ Abd, nephrolithiasis w/ retained ureteral stent (due for stent exchange w/ urology), recurrent UTIs - known Hx ESBL organisms - requires self straight cath, HTN, PAD, and HSV endophthalmitis/keratitis, left corneal transplant initially presenting w/ AMS, hypotension, now admitted to [facility] for recurrent GI bleed and UTI. Pt found to have hypovolemic/septic shock 2/2 to upper GI bleed and UTI. Pt has since received 3u pRBC and was started meropenem for suspected ESBL UTI. CTA chest/abdomen was performed, aortic-entero fistula was ruled out.  Bedside endoscope done by GI showed old blood in the stomach with moderate gastritis and a large gastric body mass and prior hemoclip.  No source of bleeding was identified and no evidence of active bleeding. Plan for EUS by GI. Urology also consulted for pt for left ureteral stent. Pt was transferred from CT surgery to MICU for further management. Pt without melanotic bleeding and stable hemodynamics, stepped down to [facility]. She was pending a EUS which was deferred Friday, Saturday, , and Monday 11/15-. On  she became tachycardic to the 140s, SBP of 80s-90s/50s, was noted to have a melanotic stool, the GI fellow was called, and she was noted to have increasing somnolence while remaining AAOx3 with worsening pallor. CBC, BCx x2, T&S were drawn. CBC w/Hgb 8.3, down from 9.3 in the AM. 2 U PRBC were ordered. Levophed gtt was ordered. Her  was called and she was consented for EGD w/ push enteroscopy by the GI fellow & for a blood transfusion by the 7 [facility] resident. Bedside EGD showed significant amount of clotted blood found in stomach with no active bleed. Gi recommended IR and surgery consult as source control was not possible with GI intervention. Case discussed at length with GI , IR , vascular and general surgery. Since patient has had recent prior splenic artery coil embolization and left gastric artery particle embolization, there is no further role for IR in this case. If the source of bleeding is the stomach, the left gastric artery has been recently embolized and the right gastric artery will be virtually impossible to catheterize through the collateral from the SMA that was accessed for LGA embolization. Pt now POD0 s/p laparoscopy total gastrectomy with Deandra-en-Y reconstruction and cholecystectomy, . Admitted to SICU for post-op management.       PAST MEDICAL & SURGICAL HISTORY:  Melena: 10/7/2019  Gastrointestinal hemorrhage: 2019, 2019, 2019  Dieulafoy lesion of stomach or duodenum: 10/1/2019  Subdural hematoma, nontraumatic: spontaneous  Dorsalgia of lumbar region: on pain medication /baclofen po and pump  Self-catheterizes urinary bladder  Anemia: chronic  Uveitis  Osteoporosis  PAD (peripheral artery disease)  Hematoma: spinal treated 2018  Paraplegia: due to spinal hematoma, on wheelchair goes to physical therapy 2 x weekly  Aortic dissection, thoracic: Type A Repaired   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 2018  UTI (urinary tract infection): recurrent  TIA (transient ischemic attack)  HTN (Hypertension)  History of corneal transplant: left corneal transplant on 2018  Disorder of spine: unthetethering 2 x  Presence of IVC filter:  ?  S/P aortic dissection repair: Type A Dissection repair /   descending aortic aneurysm repair 2016  H/O Spinal surgery: laminectomies       MEDICATIONS  (STANDING):  artificial  tears Solution 1 Drop(s) Both EYES two times a day  atorvastatin 40 milliGRAM(s) Oral at bedtime  baclofen 20 milliGRAM(s) Oral every 12 hours  Baclofen 480 MICROgram(s)/Day,Morphine 2.4 mG/Day 480 MICROGram(s) IntraThecal Continuous Pump  chlorhexidine 0.12% Liquid 15 milliLiter(s) Oral Mucosa every 12 hours  chlorhexidine 2% Cloths 1 Application(s) Topical daily  DULoxetine 20 milliGRAM(s) Oral every 12 hours  fentaNYL   Infusion. 0.5 MICROgram(s)/kG/Hr (2.75 mL/Hr) IV Continuous <Continuous>  gabapentin 600 milliGRAM(s) Oral every 8 hours  pantoprazole  Injectable 40 milliGRAM(s) IV Push two times a day  prednisoLONE acetate 1% Suspension 1 Drop(s) Both EYES four times a day  propofol Infusion 5 MICROgram(s)/kG/Min (1.65 mL/Hr) IV Continuous <Continuous>  sodium chloride 0.9% lock flush 3 milliLiter(s) IV Push every 8 hours  sodium chloride 2% Ophthalmic Solution 1 Drop(s) Both EYES daily  valACYclovir 1000 milliGRAM(s) Oral three times a day    MEDICATIONS  (PRN):      Allergies    No Known Allergies    Intolerances        FAMILY HISTORY:  No pertinent family history in first degree relatives: pt does not recall family history of medical problems in mother or father, both       ROS: otherwise negative.    Vital Signs Last 24 Hrs  T(C): 36.2 (2019 06:00), Max: 37.4 (2019 17:40)  T(F): 97.2 (2019 06:00), Max: 99.3 (2019 17:40)  HR: 78 (2019 07:00) (72 - 104)  BP: 112/54 (2019 20:00) (94/48 - 112/54)  BP(mean): 78 (2019 20:00) (67 - 78)  RR: 14 (2019 07:00) (13 - 26)  SpO2: 100% (2019 12:36) (98% - 100%)    I&O's Summary    2019 07  -  2019 07:00  --------------------------------------------------------  IN: 729.4 mL / OUT: 780 mL / NET: -50.6 mL    2019 07:  -  2019 12:42  --------------------------------------------------------  IN: 28.7 mL / OUT: 20 mL / NET: 8.7 mL        Physical Exam:  General: Intubated and on sedation. RASS -2  HEENT: MMM  Pulmonary: nonlabored breathing,   Cardiovascular: RRR  Abdominal: soft, nontender, nondistended, MEENA with serosanguinous output. Incisions x4 c/d/i  Extremities: WWP, no edema, no calf tenderness  Neuro: Sedated to RASS -2    LABS:                        8.7    9.00  )-----------( 331      ( 2019 04:09 )             26.2     11-20    140  |  112<H>  |  31<H>  ----------------------------<  81  4.2   |  21<L>  |  0.43<L>    Ca    8.0<L>      2019 04:09  Phos  2.6       Mg     1.9     20      PT/INR - ( 2019 04:09 )   PT: 12.1 sec;   INR: 1.07          PTT - ( 2019 04:09 )  PTT:31.3 sec    CAPILLARY BLOOD GLUCOSE      POCT Blood Glucose.: 88 mg/dL (2019 11:36)  POCT Blood Glucose.: 99 mg/dL (2019 10:58)  POCT Blood Glucose.: 74 mg/dL (2019 09:16)        Cultures:  Culture Results:   No growth at 1 day. ( @ 18:55)      RADIOLOGY & ADDITIONAL STUDIES:

## 2019-11-20 NOTE — CONSULT NOTE ADULT - ATTENDING COMMENTS
Pt hemodynamically stable. Will assess for sedation holiday and CPAP trial.
Ask Vascular to comment on dissection into right carotid. Deescalate abx.

## 2019-11-20 NOTE — BRIEF OPERATIVE NOTE - OPERATION/FINDINGS
Dilated and tortuous blood vessels throughout abdomen. Subtotal gastrectomy performed. Possible area of ulceration noted near the anterior pylorus. ICG fluorescence angiography used to confirm good perfusion of remaining gastric pouch. Deandra-en-Y reconstruction performed with stapled GJ anastomosis and suture closure of the common enterotomy. Stapled JJ anastomosis created. Mesenteric defects closed. Gallbladder noted to be extremely dilated and edematous, so opted to perform cholecystectomy to spare patient a future operation. Cystic duct identified, clipped and divided. Methylene blue leak test of GJ anastomosis confirmed no leak. There were no episodes of significant bleeding during the operation.

## 2019-11-20 NOTE — PROVIDER CONTACT NOTE (OTHER) - BACKGROUND
Patient just arrived back from OR. She has a history of paraplegia with a intrathecal baclofen pump and has been NPO for the last 2 days and missed her oral baclofen and gabapentin.

## 2019-11-20 NOTE — BRIEF OPERATIVE NOTE - NSICDXBRIEFPROCEDURE_GEN_ALL_CORE_FT
PROCEDURES:  Cholecystectomy, laparoscopic 20-Nov-2019 13:04:06  Elpidio Shepherd  Laparoscopic subtotal gastrectomy 20-Nov-2019 13:03:29  Elpidio Shepherd

## 2019-11-21 NOTE — PROGRESS NOTE ADULT - ATTENDING COMMENTS
Patient seen and examined with house-staff during bedside rounds.  Resident note read, including vitals, physical findings, laboratory data, and radiological reports.   Revisions included below.  Direct personal management at bed side and extensive interpretation of the data.  Plan was outlined and discussed in details with the housestaff.  Decision making of high complexity  Action taken for acute disease activity to reflect the level of care provided:  - medication reconciliation  - review laboratory data  for procedure today  hemodynamically stable  on antibiotics
Patient seen and examined with house-staff during bedside rounds.  Resident note read, including vitals, physical findings, laboratory data, and radiological reports.   Revisions included below.  Direct personal management at bed side and extensive interpretation of the data.  Plan was outlined and discussed in details with the housestaff.  Decision making of high complexity  Action taken for acute disease activity to reflect the level of care provided:  - medication reconciliation  - review laboratory data  ARF secondary to hypovolemia shock secondary to upper GI bleed possible vascular anomaly, acute blood loss secondary to GI bleed, UTI HTN HLP aortic dissection  SCD   no wean as she is going to OR  no change in setting  compliance is good  minimal secretions  resolved shock after fluid and blood resuscitation and off pressors  for OR for possible gastrectomy  Hb stable after transfusion and on PPI  on antibiotic and blood culture is pending  continue sedation and analgesics
Patient seen and examined with house-staff during bedside rounds  Resident note read, including vitals, physical findings, laboratory data, and radiological reports.   Revisions included below.  Case discussed with House staff  Direct personal management at bedside  and extensive interpretation of data. Decision making of high complexity.
Patient seen and examined with house-staff during bedside rounds.  Resident note read, including vitals, physical findings, laboratory data, and radiological reports.   Revisions included below.  Direct personal management at bed side and extensive interpretation of the data.  Plan was outlined and discussed in details with the housestaff.  Decision making of high complexity  Action taken for acute disease activity to reflect the level of care provided:  - medication reconciliation  - review laboratory data  seen after transfer from ICU
Patient seen and examined with house-staff during bedside rounds.  Resident note read, including vitals, physical findings, laboratory data, and radiological reports.   Revisions included below.  Direct personal management at bed side and extensive interpretation of the data.  Plan was outlined and discussed in details with the housestaff.  Decision making of high complexity  Action taken for acute disease activity to reflect the level of care provided:  - medication reconciliation  - review laboratory data  Hb is stable and awaiting for US Bx  follow with urology  change antibiotic  renal function stable  sepsis resolving
Patient seen and examined with house-staff during bedside rounds  Resident note read, including vitals, physical findings, laboratory data, and radiological reports.   Revisions included below.  Case discussed with House staff  Direct personal management at bedside  and extensive interpretation of data. Decision making of high complexity.

## 2019-11-21 NOTE — PROGRESS NOTE ADULT - SUBJECTIVE AND OBJECTIVE BOX
Pt remains intubated and sedated   MEDICATIONS  (STANDING):  acyclovir IVPB 250 milliGRAM(s) IV Intermittent every 12 hours  artificial  tears Solution 1 Drop(s) Both EYES two times a day  Baclofen 480 MICROgram(s)/Day,Morphine 2.4 mG/Day 480 MICROGram(s) IntraThecal Continuous Pump  chlorhexidine 0.12% Liquid 15 milliLiter(s) Oral Mucosa every 12 hours  chlorhexidine 2% Cloths 1 Application(s) Topical daily  dextrose 50% Injectable 12.5 Gram(s) IV Push once  dextrose 50% Injectable 25 Gram(s) IV Push once  fentaNYL   Infusion. 0.5 MICROgram(s)/kG/Hr (2.75 mL/Hr) IV Continuous <Continuous>  insulin lispro (HumaLOG) corrective regimen sliding scale   SubCutaneous every 6 hours  lactated ringers. 1000 milliLiter(s) (75 mL/Hr) IV Continuous <Continuous>  LORazepam   Injectable 1 milliGRAM(s) IV Push every 6 hours  pantoprazole  Injectable 40 milliGRAM(s) IV Push two times a day  prednisoLONE acetate 1% Suspension 1 Drop(s) Both EYES four times a day  propofol Infusion 5 MICROgram(s)/kG/Min (1.65 mL/Hr) IV Continuous <Continuous>  sodium chloride 0.9% lock flush 3 milliLiter(s) IV Push every 8 hours  sodium chloride 2% Ophthalmic Solution 1 Drop(s) Both EYES daily    MEDICATIONS  (PRN):      Russo:	  [ ] None	[ ] Daily Russo Order Placed	   Indication:	  [ ] Strict I and O's    [ ] Obstruction     [ ] Incontinence + Stage 3 or 4 Decubitus  Central Line:  [ ] None	   [ ]  Medication / TPN Administration     Drips:     ICU Vital Signs Last 24 Hrs  T(C): 37 (21 Nov 2019 06:02), Max: 37 (21 Nov 2019 06:02)  T(F): 98.6 (21 Nov 2019 06:02), Max: 98.6 (21 Nov 2019 06:02)  HR: 76 (21 Nov 2019 10:00) (74 - 96)  BP: 103/45 (21 Nov 2019 08:30) (103/45 - 134/73)  BP(mean): 71 (21 Nov 2019 08:30) (71 - 101)  ABP: 108/44 (21 Nov 2019 10:00) (90/40 - 146/98)  ABP(mean): 70 (21 Nov 2019 10:00) (62 - 118)  RR: 14 (21 Nov 2019 10:00) (13 - 16)  SpO2: 100% (21 Nov 2019 10:00) (99% - 100%)      Physical Exam:  General: Intubated and sedated. 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, sustained  Pulses: palpable distal pulses    Lines/tubes/drains:    Vent settings:  Mode: AC/ CMV (Assist Control/ Continuous Mandatory Ventilation), RR (machine): 14, TV (machine): 400, FiO2: 40, PEEP: 5, ITime: 1, MAP: 8, PIP: 16    I&O's Detail    20 Nov 2019 07:01  -  21 Nov 2019 07:00  --------------------------------------------------------  IN:    fentaNYL Infusion.: 298.8 mL    lactated ringers.: 1350 mL    propofol Infusion: 47.9 mL    propofol Infusion: 256.1 mL    Solution: 350 mL  Total IN: 2302.8 mL    OUT:    Bulb: 105 mL    Indwelling Catheter - Urethral: 758 mL  Total OUT: 863 mL    Total NET: 1439.8 mL      21 Nov 2019 07:01  -  21 Nov 2019 11:27  --------------------------------------------------------  IN:    fentaNYL Infusion.: 30 mL    lactated ringers.: 150 mL    propofol Infusion: 29.8 mL  Total IN: 209.8 mL    OUT:    Indwelling Catheter - Urethral: 75 mL  Total OUT: 75 mL    Total NET: 134.8 mL      	      LABS:                        8.8    10.33 )-----------( 383      ( 21 Nov 2019 04:22 )             27.5     11-21    142  |  115<H>  |  22  ----------------------------<  82  3.6   |  20<L>  |  0.40<L>    Ca    7.5<L>      21 Nov 2019 04:22  Phos  3.0     11-21  Mg     2.3     11-21    TPro  3.5<L>  /  Alb  2.0<L>  /  TBili  0.3  /  DBili  x   /  AST  49<H>  /  ALT  16  /  AlkPhos  80  11-21    PT/INR - ( 20 Nov 2019 13:44 )   PT: 11.9 sec;   INR: 1.05          PTT - ( 20 Nov 2019 13:44 )  PTT:32.0 sec    CAPILLARY BLOOD GLUCOSE      POCT Blood Glucose.: 89 mg/dL (21 Nov 2019 05:48)  POCT Blood Glucose.: 88 mg/dL (20 Nov 2019 23:23)  POCT Blood Glucose.: 80 mg/dL (20 Nov 2019 17:10)  POCT Blood Glucose.: 88 mg/dL (20 Nov 2019 11:36)    LIVER FUNCTIONS - ( 21 Nov 2019 04:22 )  Alb: 2.0 g/dL / Pro: 3.5 g/dL / ALK PHOS: 80 U/L / ALT: 16 U/L / AST: 49 U/L / GGT: x             Cultures:    RADIOLOGY & ADDITIONAL STUDIES:

## 2019-11-21 NOTE — PROGRESS NOTE ADULT - ASSESSMENT
67yo F h/o aortic dissection s/p multiple repairs, spinal hematoma resulting in paraplegia, and embolization of splenic and L gastric arteries for GI bleeding, now with recurrent GI bleed, s/p subtotal gastrectomy    - Continue CBC checks  - No vascular surgical intervention indicated at this time  - Remaining care per ICU  - Will continue to follow  - Call [phone] with questions

## 2019-11-21 NOTE — PROGRESS NOTE ADULT - SUBJECTIVE AND OBJECTIVE BOX
Vascular Surgery Consult - Progress Note    Subjective/Interval Events:  Patient remains intubated/sedated    Vital Signs Last 24 Hrs  T(C): 37 (21 Nov 2019 06:02), Max: 37 (21 Nov 2019 06:02)  T(F): 98.6 (21 Nov 2019 06:02), Max: 98.6 (21 Nov 2019 06:02)  HR: 82 (21 Nov 2019 13:00) (72 - 92)  BP: 103/45 (21 Nov 2019 08:30) (103/45 - 103/45)  BP(mean): 71 (21 Nov 2019 08:30) (71 - 71)  RR: 13 (21 Nov 2019 13:00) (13 - 16)  SpO2: 100% (21 Nov 2019 13:00) (99% - 100%)    I&O's Summary  20 Nov 2019 07:01  -  21 Nov 2019 07:00  --------------------------------------------------------  IN: 2302.8 mL / OUT: 863 mL / NET: 1439.8 mL    21 Nov 2019 07:01  -  21 Nov 2019 13:30  --------------------------------------------------------  IN: 629.4 mL / OUT: 300 mL / NET: 329.4 mL    Physical Exam:  General: NAD  Pulmonary: intubated, on vent  Abd: Soft, non-distended; incisions c/d/i; R MEENA in place  Extremities: WWP  Neuro: Sedated    LABS:                   8.8    10.33 )-----------( 383      ( 21 Nov 2019 04:22 )             27.5     11-21  142  |  115<H>  |  22  ----------------------------<  82  3.6   |  20<L>  |  0.40<L>    Ca    7.5<L>      21 Nov 2019 04:22  Phos  3.0     11-21  Mg     2.3     11-21    TPro  3.5<L>  /  Alb  2.0<L>  /  TBili  0.3  /  DBili  x   /  AST  49<H>  /  ALT  16  /  AlkPhos  80  11-21    PT/INR - ( 20 Nov 2019 13:44 )   PT: 11.9 sec;   INR: 1.05     PTT - ( 20 Nov 2019 13:44 )  PTT:32.0 sec    LIVER FUNCTIONS - ( 21 Nov 2019 04:22 )  Alb: 2.0 g/dL / Pro: 3.5 g/dL / ALK PHOS: 80 U/L / ALT: 16 U/L / AST: 49 U/L / GGT: x           CAPILLARY BLOOD GLUCOSE  POCT Blood Glucose.: 89 mg/dL (21 Nov 2019 05:48)  POCT Blood Glucose.: 88 mg/dL (20 Nov 2019 23:23)  POCT Blood Glucose.: 80 mg/dL (20 Nov 2019 17:10)

## 2019-11-21 NOTE — PROGRESS NOTE ADULT - ASSESSMENT
68F aortic dissection, UGIB, paraplegia with baclofen pump, retained ureteral stent, recurrent UTIs, HTN, PAD, a/w recurrent GI bleed and UTI, s/p laparoscopic total gastrectomy with scar-en-y reconstruction and cholecystectomy (11/20).    Neuro: Fentanyl for pain. Propofol, Baclofen pump to be interrogated. On valtrex for HSV endophthalmitis, Ativan 1mg q6hr  CV: Hold all antihypertensives (Amlodipine, labetalol)  Resp: VC-AC, daily CXR  GI: NPO, IVF, PPI  : Russo, monitor I&O. On ceftriaxone for UTI 2/2 enterobacter aerogenes.   Haem: Repeat transfuse Hb <7.  DVT: No chemoprophylaxis today. SCD  Lines: Left axillary A line (11/17 - ), Right PIV

## 2019-11-21 NOTE — CHART NOTE - NSCHARTNOTEFT_GEN_A_CORE
Admitting Diagnosis:   Patient is a 68y old  Female who presents with a chief complaint of GI bleed (18 Nov 2019 22:40)      PAST MEDICAL & SURGICAL HISTORY:  Melena: 10/7/2019  Gastrointestinal hemorrhage: 9/28/2019, 9/4/2019, 8/29/2019  Dieulafoy lesion of stomach or duodenum: 10/1/2019  Subdural hematoma, nontraumatic: spontaneous  Dorsalgia of lumbar region: on pain medication /baclofen po and pump  Self-catheterizes urinary bladder  Anemia: chronic  Uveitis  Osteoporosis  PAD (peripheral artery disease)  Hematoma: spinal treated September 2018  Paraplegia: due to spinal hematoma, on wheelchair goes to physical therapy 2 x weekly  Aortic dissection, thoracic: Type A Repaired 2009  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 2018  UTI (urinary tract infection): recurrent  TIA (transient ischemic attack)  HTN (Hypertension)  History of corneal transplant: left corneal transplant on 5/21/2018  Disorder of spine: unthetethering 2 x  Presence of IVC filter: 2014 ?  S/P aortic dissection repair: Type A Dissection repair /2009   descending aortic aneurysm repair 9/2016  H/O Spinal surgery: laminectomies 2014      Current Nutrition Order:  NPO     PO Intake: Good (%) [   ]  Fair (50-75%) [   ] Poor (<25%) [   ]- N/A; Pt is currently NPO     GI Issues:   S/p total gastrectomy w/ RY reconstruction  Last BM 11/18  Hypoactive bowel sounds     Pain:  Unable to assess  Pain management per maura     Skin Integrity:   Pt is edenamous appearing  Edema 1+ b/l ankles  Stage II pressure injury on coccyx noted.     Labs:   11-21    142  |  115<H>  |  22  ----------------------------<  82  3.6   |  20<L>  |  0.40<L>    Ca    7.5<L>      21 Nov 2019 04:22  Phos  3.0     11-21  Mg     2.3     11-21    TPro  3.5<L>  /  Alb  2.0<L>  /  TBili  0.3  /  DBili  x   /  AST  49<H>  /  ALT  16  /  AlkPhos  80  11-21    CAPILLARY BLOOD GLUCOSE      POCT Blood Glucose.: 73 mg/dL (21 Nov 2019 13:42)  POCT Blood Glucose.: 89 mg/dL (21 Nov 2019 05:48)  POCT Blood Glucose.: 88 mg/dL (20 Nov 2019 23:23)  POCT Blood Glucose.: 80 mg/dL (20 Nov 2019 17:10)      Medications:  MEDICATIONS  (STANDING):  acyclovir IVPB 250 milliGRAM(s) IV Intermittent every 12 hours  artificial  tears Solution 1 Drop(s) Both EYES two times a day  Baclofen 480 MICROgram(s)/Day,Morphine 2.4 mG/Day 480 MICROGram(s) IntraThecal Continuous Pump  chlorhexidine 0.12% Liquid 15 milliLiter(s) Oral Mucosa every 12 hours  chlorhexidine 2% Cloths 1 Application(s) Topical daily  dextrose 50% Injectable 12.5 Gram(s) IV Push once  dextrose 50% Injectable 25 Gram(s) IV Push once  fentaNYL   Infusion. 0.5 MICROgram(s)/kG/Hr (2.75 mL/Hr) IV Continuous <Continuous>  insulin lispro (HumaLOG) corrective regimen sliding scale   SubCutaneous every 6 hours  lactated ringers. 1000 milliLiter(s) (75 mL/Hr) IV Continuous <Continuous>  pantoprazole  Injectable 40 milliGRAM(s) IV Push two times a day  prednisoLONE acetate 1% Suspension 1 Drop(s) Both EYES four times a day  propofol Infusion 5 MICROgram(s)/kG/Min (1.65 mL/Hr) IV Continuous <Continuous>  sodium chloride 0.9% lock flush 3 milliLiter(s) IV Push every 8 hours  sodium chloride 2% Ophthalmic Solution 1 Drop(s) Both EYES daily    MEDICATIONS  (PRN):      Weight:  (11/12) 106lbs (48 kg)   (11/15) 134.2lbs (60.9kg) bed   (11/21) 141.4lbs (64.3kg) bed    Weight Change:   Nutrition Focused Physical Exam: Completed [ X-11/13/19  ]  Not Pertinent [   ]  From 11/13/19: If admitted weight is accurate, this would indicate a 16lb unintentional wt loss (13% wt change) x2 weeks.   Pt noted to have moderate muscle wasting in clavicular region. Moderate protein calorie malnutrition suspected.     Estimated energy needs:   Height: 5'7" Weight: 105lbs, IBW 135lbs+/-10%, %IBW 77%, BMI 16.4   ABW used for calculations as pt is <80% IBW. Needs adjusted for suspected malnutrition, +pressure injury, underweight, post-op healing  (25-30 kcal/kg): 9692-0605 kcal/day  (1.4-1.6 g/kg): 66-76 g protein/day  (30-35 ml/kg): 9435-7391 ml/day    Subjective:   Pt is a 68 y.o F h/o aortic dissection s/p multiple repairs, spinal hematoma resulting in paraplegia, and embolization of splenic and L gastric arteries for GI bleeding, now admitted for GI bleed. Pt was admitted last week where she required splenic and L gastric arterial embolization (11/7). After d/c pt presented at [place] [facility] for AMS and was transferred to [facility] after being found w/ hypotension and anemia. Pt w/ acute drop in hgb and HD instability following melenic stool. S/p bedside EGD 11/12 showing large amounts of blood in the stomach but no active bleeding. Pt also w/ notable large amounts of stool and rectal distension on CT. Pt was initially intubated for airway protection, now extubated. Pt was transferred from CT surgery to MICU for further management.  Was planned for EUS which was deferred 11/15-11/18. On 11/18, pt became tachy, hypotensive, and noted to have melanotic stool. Hgb dropping again, was given 2U pRBC and placed on levo gtt. Bedside EGD showing significant amounts of clotted blood in stomach w/ no active bleed. Consensus between GI, IR, vascular and gen surgery was for total gastrectomy. Pt now POD1 s/p total gastrectomy w/ Deandra en Y reconstruction and cholecystectomy. Admitted to SICU team for post-op management. Pt seen in room, intubated on VC/AC mode. Sedated on fentanyl w/ propofol infusing @ 14.9ml/hr (providing 393kcal from lipids/day). MAP 64, not requiring pressors at this time. Currently NPO- has been primarily NPO since admission except when on a mechanical soft diet from 11/15-18. This indicates at least 7 days of NPO status. Pt is suspected to have moderate protein calorie malnutrition 2/2 13% wt change x2 weeks and visible muscle wasting in temporal and clavicular region. Pt is also at high risk for further unintentional wt loss s/p total gastrectomy. If PO/EN cannot be initiated please consider alternative route for nutrition support. See recs below. RD to follow.     Previous Nutrition Diagnosis:  Moderate protein-calorie malnutrition RT unclear etiology AEB 13% wt loss x 2 weeks, NFPE findings include moderate muscle wasting.     Active [ X  ]  Resolved [   ]    Goal: Nutrition initiation, meeting >% EER via most appropriate route    Recommendations:  1) With continued intubation, recommend EN initiation with route per MD as medically feasible. If pt able to be safely extubated, perform dysphagia screen vs formal S&S prior to PO. RD to follow per policy.   2) If EN deemed the most appropriate route, recommend Vital 1.5 @23ml/hr x24hrs +2 prostat/day via jejunum (provides incl. prostat +propofol: 552ml TV, 1421kcal, 67g pro, 421ml FW, meets 55% of RDIs)  3) Monitor for s/s intolerance. Maintain aspiration precautions at all times.   4) If unable to initiate EN, TPN is indicated at this time d/t prolonged NPO status and poor nutritional status.   5) Obtain and monitor wt trends.   *d/w team.     Education:   N/A 2/2 intubated/sedated    Risk Level: High [ X ] Moderate [   ] Low [   ].

## 2019-11-22 NOTE — PROGRESS NOTE ADULT - ASSESSMENT
68F aortic dissection, UGIB, paraplegia with baclofen pump, retained ureteral stent, recurrent UTIs, HTN, PAD, a/w recurrent GI bleed and UTI, s/p laparoscopic total gastrectomy with scar-en-y reconstruction and cholecystectomy (11/20). Extubated, slightly altered today.     -can have clears   -continue to hold heparin for now  -rest of care per SICU team   -discussed with Dr. Woods

## 2019-11-22 NOTE — PROGRESS NOTE ADULT - SUBJECTIVE AND OBJECTIVE BOX
POD: 2  Procedure: lap subtotal gastrectomy, cholecystectomy     SUBJECTIVE: Patient seen and examined with Dr. Woods. Patient extubated this morning and has been slightly altered since according to SICU team, s/p haldol x2. Patient altered when examined by us, asking repeatedly to be untied and unable to appropriately answer questions. Says she is in pain.       Vital Signs Last 24 Hrs  T(C): 36.6 (22 Nov 2019 14:01), Max: 37.6 (21 Nov 2019 21:00)  T(F): 97.8 (22 Nov 2019 14:01), Max: 99.6 (21 Nov 2019 21:00)  HR: 86 (22 Nov 2019 14:00) (70 - 126)  BP: 111/43 (21 Nov 2019 20:26) (111/43 - 111/43)  BP(mean): 64 (21 Nov 2019 20:26) (64 - 64)  RR: 24 (22 Nov 2019 14:00) (8 - 26)  SpO2: 92% (22 Nov 2019 14:00) (77% - 100%)    Physical Exam:  General: NAD  Pulmonary: Nonlabored breathing, no respiratory distress  Abdominal: soft, minimally distended, nontender with no rebound or guarding. incisions CDI, MEENA ss output  Extremities: WWP, normal strength, no clubbing/cyanosis/edema  Neuro: A/O x3    Lines/drains/tubes:    I&O's Summary    21 Nov 2019 07:01  -  22 Nov 2019 07:00  --------------------------------------------------------  IN: 3212.5 mL / OUT: 1505 mL / NET: 1707.5 mL    22 Nov 2019 07:01  -  22 Nov 2019 16:02  --------------------------------------------------------  IN: 852.7 mL / OUT: 425 mL / NET: 427.7 mL        LABS:                        8.3    10.90 )-----------( 417      ( 22 Nov 2019 06:04 )             26.2     11-22    142  |  114<H>  |  12  ----------------------------<  91  3.9   |  22  |  0.33<L>    Ca    7.6<L>      22 Nov 2019 06:04  Phos  3.2     11-22  Mg     1.8     11-22    TPro  3.5<L>  /  Alb  2.0<L>  /  TBili  0.3  /  DBili  x   /  AST  49<H>  /  ALT  16  /  AlkPhos  80  11-21        CAPILLARY BLOOD GLUCOSE      POCT Blood Glucose.: 105 mg/dL (22 Nov 2019 12:24)  POCT Blood Glucose.: 70 mg/dL (22 Nov 2019 12:21)  POCT Blood Glucose.: 91 mg/dL (22 Nov 2019 06:05)  POCT Blood Glucose.: 88 mg/dL (21 Nov 2019 23:56)  POCT Blood Glucose.: 87 mg/dL (21 Nov 2019 18:58)    LIVER FUNCTIONS - ( 21 Nov 2019 04:22 )  Alb: 2.0 g/dL / Pro: 3.5 g/dL / ALK PHOS: 80 U/L / ALT: 16 U/L / AST: 49 U/L / GGT: x             RADIOLOGY & ADDITIONAL STUDIES:

## 2019-11-22 NOTE — PROGRESS NOTE ADULT - ASSESSMENT
68F aortic dissection, UGIB, paraplegia with baclofen pump, retained ureteral stent, recurrent UTIs, HTN, PAD, a/w recurrent GI bleed and UTI, s/p laparoscopic total gastrectomy with scar-en-y reconstruction and cholecystectomy (11/20).    Neuro: Fentanyl for pain. Propofol, Baclofen pump to be interrogated. On valtrex for HSV endophthalmitis, Ativan 1mg q6hr  CV: Hold all antihypertensives (Amlodipine, labetalol)  Resp: VC-AC, daily CXR  GI: NPO, IVF, PPI  : Russo, monitor I&O. On ceftriaxone for UTI 2/2 enterobacter aerogenes.   Haem: Repeat transfuse Hb <7.  DVT: No chemoprophylaxis today. SCD  Lines: Left axillary A line (11/17 - ), Right PIV, RIJ 68F aortic dissection, UGIB, paraplegia with baclofen pump, retained ureteral stent, recurrent UTIs, HTN, PAD, a/w recurrent GI bleed and UTI, s/p laparoscopic total gastrectomy with scar-en-y reconstruction and cholecystectomy (11/20).    Neuro: Precedex On valtrex for HSV endophthalmitis,   CV: Hold all antihypertensives (Amlodipine, labetalol)  Resp: Extubated on Facemask  GI: NPO, IVF, TPN  : Russo, monitor I&O. On ceftriaxone for UTI 2/2 enterobacter aerogenes.   Haem: Repeat transfuse Hb <7.  DVT: No chemoprophylaxis today. SCD  Lines: Left axillary A line (11/17 - ), Right PIV, RIJ

## 2019-11-22 NOTE — PROGRESS NOTE ADULT - SUBJECTIVE AND OBJECTIVE BOX
Vascular Surgery Consult    Subjective:  Patient attempting CPAP trial at time of exam.    Vital Signs Last 24 Hrs  T(C): 37.4 (22 Nov 2019 05:00), Max: 37.6 (21 Nov 2019 21:00)  T(F): 99.4 (22 Nov 2019 05:00), Max: 99.6 (21 Nov 2019 21:00)  HR: 86 (22 Nov 2019 09:00) (70 - 104)  BP: 111/43 (21 Nov 2019 20:26) (107/49 - 111/43)  BP(mean): 64 (21 Nov 2019 20:26) (64 - 77)  RR: 13 (22 Nov 2019 09:00) (13 - 14)  SpO2: 98% (22 Nov 2019 09:00) (98% - 100%)    I&O's Summary  21 Nov 2019 07:01  -  22 Nov 2019 07:00  --------------------------------------------------------  IN: 3212.5 mL / OUT: 1505 mL / NET: 1707.5 mL    22 Nov 2019 07:01  -  22 Nov 2019 10:51  --------------------------------------------------------  IN: 464.7 mL / OUT: 200 mL / NET: 264.7 mL    Physical Exam:  General: NAD  Pulmonary: intubated, attempting CPAP trial  Abd: Soft, non-distended; incisions c/d/i; R MEENA in place  Extremities: WWP    LABS:                   8.3    10.90 )-----------( 417      ( 22 Nov 2019 06:04 )             26.2     11-22  142  |  114<H>  |  12  ----------------------------<  91  3.9   |  22  |  0.33<L>    Ca    7.6<L>      22 Nov 2019 06:04  Phos  3.2     11-22  Mg     1.8     11-22    TPro  3.5<L>  /  Alb  2.0<L>  /  TBili  0.3  /  DBili  x   /  AST  49<H>  /  ALT  16  /  AlkPhos  80  11-21    PT/INR - ( 20 Nov 2019 13:44 )   PT: 11.9 sec;   INR: 1.05     PTT - ( 20 Nov 2019 13:44 )  PTT:32.0 sec    LIVER FUNCTIONS - ( 21 Nov 2019 04:22 )  Alb: 2.0 g/dL / Pro: 3.5 g/dL / ALK PHOS: 80 U/L / ALT: 16 U/L / AST: 49 U/L / GGT: x           CAPILLARY BLOOD GLUCOSE  POCT Blood Glucose.: 91 mg/dL (22 Nov 2019 06:05)  POCT Blood Glucose.: 88 mg/dL (21 Nov 2019 23:56)  POCT Blood Glucose.: 87 mg/dL (21 Nov 2019 18:58)  POCT Blood Glucose.: 73 mg/dL (21 Nov 2019 13:42)

## 2019-11-22 NOTE — PROGRESS NOTE ADULT - SUBJECTIVE AND OBJECTIVE BOX
Pt remains intubated and sedated     MEDICATIONS  (STANDING):  acyclovir IVPB 250 milliGRAM(s) IV Intermittent every 12 hours  artificial  tears Solution 1 Drop(s) Both EYES two times a day  Baclofen 480 MICROgram(s)/Day,Morphine 2.4 mG/Day,Baclofen 480 MICROGram(s),Morphine 2.4 milliGRAM(s) 480 MICROGram(s) IntraThecal Continuous Pump  chlorhexidine 0.12% Liquid 15 milliLiter(s) Oral Mucosa every 12 hours  chlorhexidine 2% Cloths 1 Application(s) Topical daily  dextrose 5% + sodium chloride 0.45%. 1000 milliLiter(s) (75 mL/Hr) IV Continuous <Continuous>  dextrose 50% Injectable 12.5 Gram(s) IV Push once  dextrose 50% Injectable 25 Gram(s) IV Push once  fentaNYL   Infusion. 0.5 MICROgram(s)/kG/Hr (2.75 mL/Hr) IV Continuous <Continuous>  insulin lispro (HumaLOG) corrective regimen sliding scale   SubCutaneous every 6 hours  pantoprazole  Injectable 40 milliGRAM(s) IV Push two times a day  prednisoLONE acetate 1% Suspension 1 Drop(s) Both EYES four times a day  propofol Infusion 5 MICROgram(s)/kG/Min (1.65 mL/Hr) IV Continuous <Continuous>  sodium chloride 0.9% lock flush 3 milliLiter(s) IV Push every 8 hours  sodium chloride 2% Ophthalmic Solution 1 Drop(s) Both EYES daily    MEDICATIONS  (PRN):    MEDICATIONS  (PRN):      Russo:	  [ ] None	[ ] Daily Russo Order Placed	   Indication:	  [ ] Strict I and O's    [ ] Obstruction     [ ] Incontinence + Stage 3 or 4 Decubitus  Central Line:  [ ] None	   [ ]  Medication / TPN Administration     Drips:     ICU Vital Signs Last 24 Hrs  T(C): 37.4 (22 Nov 2019 05:00), Max: 37.6 (21 Nov 2019 21:00)  T(F): 99.4 (22 Nov 2019 05:00), Max: 99.6 (21 Nov 2019 21:00)  HR: 78 (22 Nov 2019 07:00) (70 - 90)  BP: 111/43 (21 Nov 2019 20:26) (103/45 - 111/43)  BP(mean): 64 (21 Nov 2019 20:26) (64 - 77)  ABP: 98/44 (22 Nov 2019 07:00) (80/36 - 134/80)  ABP(mean): 66 (22 Nov 2019 07:00) (56 - 96)  RR: 13 (22 Nov 2019 07:00) (13 - 15)  SpO2: 98% (22 Nov 2019 07:00) (98% - 100%)        Physical Exam:  General: Intubated and sedated. 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, sustained  Pulses: palpable distal pulses    Lines/tubes/drains:    Vent settings:  Mode: AC/ CMV (Assist Control/ Continuous Mandatory Ventilation), RR (machine): 14, TV (machine): 400, FiO2: 40, PEEP: 5, ITime: 1, MAP: 8, PIP: 16    I&O's Detail    21 Nov 2019 07:01  -  22 Nov 2019 07:00  --------------------------------------------------------  IN:    dextrose 5% + sodium chloride 0.45%.: 1050 mL    fentaNYL Infusion.: 315 mL    fentaNYL Infusion.: 45 mL    Lactated Ringers IV Bolus: 500 mL    lactated ringers.: 825 mL    propofol Infusion: 372.5 mL    Solution: 105 mL  Total IN: 3212.5 mL    OUT:    Bulb: 100 mL    Indwelling Catheter - Urethral: 1405 mL  Total OUT: 1505 mL    Total NET: 1707.5 mL            	                          8.3    10.90 )-----------( 417      ( 22 Nov 2019 06:04 )             26.2              11-22    142  |  114<H>  |  12  ----------------------------<  91  3.9   |  22  |  0.33<L>    Ca    7.6<L>      22 Nov 2019 06:04  Phos  3.2     11-22  Mg     1.8     11-22    TPro  3.5<L>  /  Alb  2.0<L>  /  TBili  0.3  /  DBili  x   /  AST  49<H>  /  ALT  16  /  AlkPhos  80  11-21

## 2019-11-23 NOTE — PROGRESS NOTE ADULT - SUBJECTIVE AND OBJECTIVE BOX
Patient was seen and examined at bedside. No acute event overnight. No complaints. Stepped down to 8la today      Vital Signs Last 24 Hrs  T(C): 37.5 (23 Nov 2019 18:15), Max: 37.5 (23 Nov 2019 18:15)  T(F): 99.5 (23 Nov 2019 18:15), Max: 99.5 (23 Nov 2019 18:15)  HR: 88 (23 Nov 2019 19:09) (58 - 92)  BP: 181/81 (23 Nov 2019 19:09) (164/75 - 181/81)  BP(mean): 108 (23 Nov 2019 14:32) (108 - 108)  RR: 16 (23 Nov 2019 14:32) (11 - 18)  SpO2: 96% (23 Nov 2019 14:32) (78% - 98%)    Physical Exam:  General: NAD  Pulmonary: Nonlabored breathing, no respiratory distress  Cardiovascular: NSR  Abdominal: soft, NT/ND, lap incisions healing well, drain on L side with serous output  Extremities: WWP, normal strength, no clubbing/cyanosis/edema, SCDs on  Neuro: A/O x3, no focal deficits, normal sensation      Lines/drains/tubes:    I&O's Summary    22 Nov 2019 07:01  -  23 Nov 2019 07:00  --------------------------------------------------------  IN: 2235.3 mL / OUT: 2400 mL / NET: -164.7 mL    23 Nov 2019 07:01  -  23 Nov 2019 21:13  --------------------------------------------------------  IN: 395.5 mL / OUT: 1590 mL / NET: -1194.5 mL        LABS:                        9.1    14.46 )-----------( 451      ( 23 Nov 2019 05:08 )             27.9     11-23    140  |  108  |  7   ----------------------------<  145<H>  3.7   |  26  |  0.25<L>    Ca    8.1<L>      23 Nov 2019 05:08  Phos  3.2     11-23  Mg     1.9     11-23          CAPILLARY BLOOD GLUCOSE      POCT Blood Glucose.: 91 mg/dL (23 Nov 2019 17:52)  POCT Blood Glucose.: 137 mg/dL (23 Nov 2019 12:24)  POCT Blood Glucose.: 137 mg/dL (23 Nov 2019 05:24)  POCT Blood Glucose.: 143 mg/dL (22 Nov 2019 22:50)        RADIOLOGY & ADDITIONAL STUDIES:

## 2019-11-23 NOTE — PROGRESS NOTE ADULT - SUBJECTIVE AND OBJECTIVE BOX
INTERVAL HPI: Patient seen and examined at bedside by chief resident. No acute events overnight.     acyclovir   Oral Tab/Cap 1000  acyclovir   Oral Tab/Cap 1000  labetalol Injectable 10 PRN        Vital Signs Last 24 Hrs  T(C): 36.7 (23 Nov 2019 06:21), Max: 37.3 (22 Nov 2019 11:23)  T(F): 98.1 (23 Nov 2019 06:21), Max: 99.1 (22 Nov 2019 11:23)  HR: 66 (23 Nov 2019 09:00) (58 - 126)  BP: --  BP(mean): --  RR: 15 (23 Nov 2019 09:00) (11 - 26)  SpO2: 78% (23 Nov 2019 09:00) (77% - 100%)  I&O's Summary    22 Nov 2019 07:01  -  23 Nov 2019 07:00  --------------------------------------------------------  IN: 2235.3 mL / OUT: 2400 mL / NET: -164.7 mL    23 Nov 2019 07:01  -  23 Nov 2019 10:28  --------------------------------------------------------  IN: 165.5 mL / OUT: 450 mL / NET: -284.5 mL        Physical Exam:  General: NAD  Pulmonary: Nonlabored breathing, no respiratory distress  Cardiovascular: RRR  Abd: Soft NT ND +incisions c/d/i christiane SS.   Extremities: WWP, no edema        LABS:                        9.1    14.46 )-----------( 451      ( 23 Nov 2019 05:08 )             27.9     11-23    140  |  108  |  7   ----------------------------<  145<H>  3.7   |  26  |  0.25<L>    Ca    8.1<L>      23 Nov 2019 05:08  Phos  3.2     11-23  Mg     1.9     11-23            Assessment and Plan:

## 2019-11-23 NOTE — PROGRESS NOTE ADULT - ASSESSMENT
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 Admitted for recurrent Upper GI bleed and UTI, s/p failed embolization x1 and multpiple endoscopy attempts. Now s/p laparoscopic total gastrectomy with scar-en-y reconstruction and cholecystectomy (11/20).    Neuro: Morphine, Baclofen pump with morphine, restart Baclofen po today, restart gabapentin 600 tid. Delirium haldol prn will wean off precedex this am   HEENT: On valtrex for HSV endophthalmitis, switch to PO dose today 1g tid  CV: HTN this am will restart norvasc if continues to be HTNsive after PO baclofen and neurotin. AAA: BP goal <140 labetalol prn   Resp: NC  GI: CLD TPN hyperchloremic - avoid Cl  : Russo, monitor I&O. Retained Ureteral stent with recurrent UTI On ceftriaxone for UTI 2/2 enterobacter aerogenes.   PPX: SQH on hold as per Surgical team SCD  ID: Valtrex for HSV endophthalmitis( 11/20-)  Lines: Left axillary A line (11/17 - ), Right PIV, RIJ ( 11/22-)   PT: Ordered 11/23 oob to chair  Dispo: SDU in afternoon

## 2019-11-23 NOTE — PROGRESS NOTE ADULT - ASSESSMENT
67yo F h/o aortic dissection s/p multiple repairs, spinal hematoma resulting in paraplegia, and embolization of splenic and L gastric arteries for GI bleeding, now with recurrent GI bleed, s/p subtotal gastrectomy    - Continue CBC checks  - No vascular surgical intervention indicated at this time  - Remaining care per primary team  - Will continue to follow  - Call [phone] with questions

## 2019-11-23 NOTE — PROGRESS NOTE ADULT - SUBJECTIVE AND OBJECTIVE BOX
Interval Events:  pain overnight resolved with breakthrough pain medication   Patient seen and examined at bedside.      Allergies    No Known Allergies    Intolerances        Vital Signs Last 24 Hrs  T(C): 36.7 (23 Nov 2019 06:21), Max: 37.3 (22 Nov 2019 11:23)  T(F): 98.1 (23 Nov 2019 06:21), Max: 99.1 (22 Nov 2019 11:23)  HR: 66 (23 Nov 2019 09:00) (58 - 126)  BP: --  BP(mean): --  RR: 15 (23 Nov 2019 09:00) (8 - 26)  SpO2: 78% (23 Nov 2019 09:00) (77% - 100%)    11-22 @ 07:01  -  11-23 @ 07:00  --------------------------------------------------------  IN: 2235.3 mL / OUT: 2400 mL / NET: -164.7 mL    11-23 @ 07:01  -  11-23 @ 09:57  --------------------------------------------------------  IN: 165.5 mL / OUT: 450 mL / NET: -284.5 mL      11-22 @ 07:01  -  11-23 @ 07:00  --------------------------------------------------------  IN: 2235.3 mL / OUT: 2400 mL / NET: -164.7 mL    11-23 @ 07:01  -  11-23 @ 09:57  --------------------------------------------------------  IN: 165.5 mL / OUT: 450 mL / NET: -284.5 mL        Physical Exam:     Gen: NAD well nourished  Neuro: Awake alert No deficits   CV:RRR Reg s1s2 noM  Pulm: CTA b/l No w/r/r  Abd: Soft NT ND +incisions c/d/i christiane SS.   Ext: No C/C/E   Vasc: + DP b/l   Skin: no rashes noted  MSK: No joint swelling  Psych: Pt anxious at times but easily calmed.       LABS:      CBC Full  -  ( 23 Nov 2019 05:08 )  WBC Count : 14.46 K/uL  RBC Count : 3.14 M/uL  Hemoglobin : 9.1 g/dL  Hematocrit : 27.9 %  Platelet Count - Automated : 451 K/uL  Mean Cell Volume : 88.9 fl  Mean Cell Hemoglobin : 29.0 pg  Mean Cell Hemoglobin Concentration : 32.6 gm/dL  Auto Neutrophil # : x  Auto Lymphocyte # : x  Auto Monocyte # : x  Auto Eosinophil # : x  Auto Basophil # : x  Auto Neutrophil % : x  Auto Lymphocyte % : x  Auto Monocyte % : x  Auto Eosinophil % : x  Auto Basophil % : x    11-23    140  |  108  |  7   ----------------------------<  145<H>  3.7   |  26  |  0.25<L>    Ca    8.1<L>      23 Nov 2019 05:08  Phos  3.2     11-23  Mg     1.9     11-23                      RADIOLOGY & ADDITIONAL STUDIES (The following images were personally reviewed):          A/p: 68F with UGIB, 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 Admitted for recurrent Upper GI bleed and UTI, s/p failed embolization x1 and multpiple endoscopy attempts. Now s/p laparoscopic total gastrectomy with scar-en-y reconstruction and cholecystectomy (11/20).    Neuro: Morphine, Baclofen pump with morphine, restart Baclofen po today, restart gabapentin 600 tid. Delirium haldol prn will wean off precedex this am   HEENT: On valtrex for HSV endophthalmitis, switch to PO dose today 1g tid  CV: HTN this am will restart norvasc if continues to be HTNsive after PO baclofen and neurotin. AAA: BP goal <140 labetalol prn   Resp: NC  GI: CLD TPN hyperchloremic - avoid Cl  : Russo, monitor I&O. Retained Ureteral stent with recurrent UTI On ceftriaxone for UTI 2/2 enterobacter aerogenes.   PPX: SQH on hold as per Surgical team SCD  ID: Valtrex for HSV endophthalmitis( 11/20-)  Lines: Left axillary A line (11/17 - ), Right PIV, RIJ ( 11/22-)   PT: Ordered 11/23 oob to chair  Dispo: SDU in afternoon Interval Events:  pain overnight resolved with breakthrough pain medication   Patient seen and examined at bedside.      Allergies    No Known Allergies    Intolerances        Vital Signs Last 24 Hrs  T(C): 36.7 (23 Nov 2019 06:21), Max: 37.3 (22 Nov 2019 11:23)  T(F): 98.1 (23 Nov 2019 06:21), Max: 99.1 (22 Nov 2019 11:23)  HR: 66 (23 Nov 2019 09:00) (58 - 126)  BP: --  BP(mean): --  RR: 15 (23 Nov 2019 09:00) (8 - 26)  SpO2: 78% (23 Nov 2019 09:00) (77% - 100%)    11-22 @ 07:01  -  11-23 @ 07:00  --------------------------------------------------------  IN: 2235.3 mL / OUT: 2400 mL / NET: -164.7 mL    11-23 @ 07:01  -  11-23 @ 09:57  --------------------------------------------------------  IN: 165.5 mL / OUT: 450 mL / NET: -284.5 mL      11-22 @ 07:01  -  11-23 @ 07:00  --------------------------------------------------------  IN: 2235.3 mL / OUT: 2400 mL / NET: -164.7 mL    11-23 @ 07:01  -  11-23 @ 09:57  --------------------------------------------------------  IN: 165.5 mL / OUT: 450 mL / NET: -284.5 mL        Physical Exam:     Gen: NAD well nourished  Neuro: Awake alert No deficits   CV:RRR Reg s1s2 noM  Pulm: CTA b/l No w/r/r  Abd: Soft NT ND +incisions c/d/i christiane SS.   Ext: No C/C/E   Vasc: + DP b/l   Skin: no rashes noted  MSK: No joint swelling  Psych: Pt anxious at times but easily calmed.       LABS:      CBC Full  -  ( 23 Nov 2019 05:08 )  WBC Count : 14.46 K/uL  RBC Count : 3.14 M/uL  Hemoglobin : 9.1 g/dL  Hematocrit : 27.9 %  Platelet Count - Automated : 451 K/uL  Mean Cell Volume : 88.9 fl  Mean Cell Hemoglobin : 29.0 pg  Mean Cell Hemoglobin Concentration : 32.6 gm/dL  Auto Neutrophil # : x  Auto Lymphocyte # : x  Auto Monocyte # : x  Auto Eosinophil # : x  Auto Basophil # : x  Auto Neutrophil % : x  Auto Lymphocyte % : x  Auto Monocyte % : x  Auto Eosinophil % : x  Auto Basophil % : x    11-23    140  |  108  |  7   ----------------------------<  145<H>  3.7   |  26  |  0.25<L>    Ca    8.1<L>      23 Nov 2019 05:08  Phos  3.2     11-23  Mg     1.9     11-23                      RADIOLOGY & ADDITIONAL STUDIES (The following images were personally reviewed):          A/p: 68F with UGIB, 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 Admitted for recurrent Upper GI bleed and UTI, s/p failed embolization x1 and multpiple endoscopy attempts. Now s/p laparoscopic total gastrectomy with scar-en-y reconstruction and cholecystectomy (11/20).    Neuro: Morphine, Baclofen pump with morphine, restart Baclofen po today, restart gabapentin 600 tid. Delirium haldol prn will wean off precedex this am   HEENT: On valtrex for HSV endophthalmitis, switch to PO dose today 1g tid  CV: HTN this am will restart norvasc if continues to be HTNsive after PO baclofen and neurotin. AAA: BP goal <140 labetalol prn   Resp: NC  GI: CLD TPN hyperchloremic - avoid Cl  : Russo removed - straight cath q6, monitor I&O. Retained Ureteral stent with recurrent UTI On ceftriaxone for UTI 2/2 enterobacter aerogenes.   PPX: SQH on hold as per Surgical team SCD  ID: Valtrex for HSV endophthalmitis( 11/20-)  Lines: Left axillary A line (11/17 - ), Right PIV, RIJ ( 11/22-)   PT: Ordered 11/23 oob to chair  Dispo: SDU in afternoon

## 2019-11-24 NOTE — PHYSICAL THERAPY INITIAL EVALUATION ADULT - PLANNED THERAPY INTERVENTIONS, PT EVAL
balance training/bed mobility training/transfer training/stretching/gait training/neuromuscular re-education/strengthening/ROM/postural re-education

## 2019-11-24 NOTE — PHYSICAL THERAPY INITIAL EVALUATION ADULT - PASSIVE RANGE OF MOTION EXAMINATION, REHAB EVAL
only has 20 degrees hip abduction bilaterally secondary to weakness/deficits as listed below/bilateral upper extremity Passive ROM was WFL (within functional limits)

## 2019-11-24 NOTE — PHYSICAL THERAPY INITIAL EVALUATION ADULT - PERTINENT HX OF CURRENT PROBLEM, REHAB EVAL
69 y/o Female with h/o aortic dissection s/p multiple repairs, spinal hematoma resulting in paraplegia (on baclofen pump), nephrolithiasis with retained stent (did not f/u to remove as per pt s/p d/c home), recurrent UTIs ESBL positive in the past 2/2 to straight catheterizations, HTN, PAD, and HSV endophthalmitis/keratitis s/p left corneal transplant who presented to [facility] on 10/28/19 with lethargy, weakness, malaise, and endorsing dark stools.

## 2019-11-24 NOTE — PHYSICAL THERAPY INITIAL EVALUATION ADULT - LEVEL OF INDEPENDENCE: SIT/STAND, REHAB EVAL
unable to perform/patient has not stood in years and is dependent on wheelchair for mobility - able to perform scoot transfer from bed to bedside chair and back with min/mod assist of 2

## 2019-11-24 NOTE — PHYSICAL THERAPY INITIAL EVALUATION ADULT - IMPAIRMENTS FOUND, PT EVAL
tone/posture/gait, locomotion, and balance/gross motor/aerobic capacity/endurance/muscle strength/ROM

## 2019-11-24 NOTE — PROGRESS NOTE ADULT - ASSESSMENT
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 Admitted for recurrent Upper GI bleed and UTI, s/p failed embolization x1 and multpiple endoscopy attempts. Now s/p laparoscopic total gastrectomy with scar-en-y reconstruction and cholecystectomy (11/20).    Neuro: Morphine, Baclofen pump with morphine, Baclofen 20 po bid, gabapentin 600 tid. Delirium haldol prn Cymbalta 20 bid   HEENT: On valtrex for HSV endophthalmitis, switch to PO dose today 1g tid Prednisone eye drops qd   CV: HTN -  restart norvasc if continues to be HTNsive after PO baclofen and neurotin. AAA: BP goal <140 labetalol prn   Resp: NC  GI: NPO TPN hyperchloremic - avoid Cl  : Russo, monitor I&O. Retained Ureteral stent with recurrent UTI On ceftriaxone for UTI 2/2 enterobacter aerogenes.   PPX: SQH on hold as per Surgical team SCD  ID: Valtrex for HSV endophthalmitis( 11/20-)  Lines:  Right PIV, RIJ ( 11/12-)

## 2019-11-24 NOTE — PHYSICAL THERAPY INITIAL EVALUATION ADULT - ADDITIONAL COMMENTS
Patient lives with her  in an elevator apartment with a handicapped accessible ramp to enter. Prior to admission, patient states she has not walked or stood in years. Has a home health attendant from 9AM-4PM 6x/week.

## 2019-11-24 NOTE — PHYSICAL THERAPY INITIAL EVALUATION ADULT - CRITERIA FOR SKILLED THERAPEUTIC INTERVENTIONS
anticipated discharge recommendation/rehab potential/functional limitations in following categories/impairments found/risk reduction/prevention/therapy frequency

## 2019-11-24 NOTE — PHYSICAL THERAPY INITIAL EVALUATION ADULT - MANUAL MUSCLE TESTING RESULTS, REHAB EVAL
L knee extension strength 2-/5; R knee extension strength 1+/5; L hip flexion 1+/5; R hip flexion 1+/5; Bilateral  strength 4-/5; bilateral elbow flexion 4+/5; bilateral elbow extension 4-/5; bilateral shoulder flexion 4-/5

## 2019-11-24 NOTE — PROGRESS NOTE ADULT - SUBJECTIVE AND OBJECTIVE BOX
OVERNIGHT EVENTS:SBP up to 180, gave 10mg labetalol, SBP dec to 160. Around 10 pm, pt agitated - SBP inc to 180. Ordered another 10mg labetalol - fell back to 160  11/23: TPN Reordered dex increased, Started  CLD Changed meds to PO restarted cymbalta, lipitor, baclofen and gabapentin. Russo removed. Stepped down to 8Lach.      11/7: IR embolization of splenic and left gastric arteries  11/20: total gastrectomy, Deandra en Y reconstruction, cholecystectomy, 10 Fr drain over GJ    SUBJECTIVE:    acyclovir   Oral Tab/Cap 1000 milliGRAM(s) Oral every 8 hours  labetalol Injectable 10 milliGRAM(s) IV Push every 6 hours PRN      Vital Signs Last 24 Hrs  T(C): 36.8 (24 Nov 2019 09:11), Max: 37.5 (23 Nov 2019 18:15)  T(F): 98.2 (24 Nov 2019 09:11), Max: 99.5 (23 Nov 2019 18:15)  HR: 82 (24 Nov 2019 12:17) (74 - 94)  BP: 160/75 (24 Nov 2019 12:17) (159/74 - 181/81)  BP(mean): 108 (24 Nov 2019 12:17) (106 - 119)  RR: 16 (24 Nov 2019 08:24) (16 - 18)  SpO2: 95% (24 Nov 2019 04:05) (93% - 96%)  I&O's Detail    23 Nov 2019 07:01  -  24 Nov 2019 07:00  --------------------------------------------------------  IN:    dexmedetomidine Infusion: 15.5 mL    fat emulsion (Plant Based) 20% Infusion: 416 mL    Oral Fluid: 530 mL    TPN (Total Parenteral Nutrition): 950 mL  Total IN: 1911.5 mL    OUT:    Bulb: 350 mL    Indwelling Catheter - Urethral: 485 mL    Intermittent Catheterization - Urethral: 600 mL    Voided: 1850 mL  Total OUT: 3285 mL    Total NET: -1373.5 mL      24 Nov 2019 07:01  -  24 Nov 2019 14:00  --------------------------------------------------------  IN:    fat emulsion (Plant Based) 20% Infusion: 62.4 mL    TPN (Total Parenteral Nutrition): 250 mL  Total IN: 312.4 mL    OUT:    Bulb: 50 mL    Voided: 600 mL  Total OUT: 650 mL    Total NET: -337.6 mL          General: NAD, resting comfortably in bed  C/V: NSR  Pulm: Nonlabored breathing, no respiratory distress  Abd: Soft, non-distended, TTP around incision site, incision clean dry and intact  Extrem: WWP, no edema, SCDs in place  MEENA drain putting out minimal serosanguineous fluid     LABS:                        11.0   20.03 )-----------( 578      ( 24 Nov 2019 06:19 )             34.4     11-24    139  |  100  |  8   ----------------------------<  134<H>  3.4<L>   |  28  |  0.23<L>    Ca    8.5      24 Nov 2019 06:19  Phos  2.7     11-24  Mg     1.9     11-24            RADIOLOGY & ADDITIONAL STUDIES:

## 2019-11-24 NOTE — PHYSICAL THERAPY INITIAL EVALUATION ADULT - DIAGNOSIS, PT EVAL
Practice Pattern 4I: Impaired Joint Mobility, Motor Function, Muscle Performance, and Range of Motion Associated with Bony or Soft Tissue Surgery

## 2019-11-24 NOTE — PHYSICAL THERAPY INITIAL EVALUATION ADULT - DISCHARGE DISPOSITION, PT EVAL
and 24/7 assistance via home health attendant and  who is able bodied and available to assist according to patient/home w/ home PT/home w/ assist

## 2019-11-25 NOTE — PROGRESS NOTE ADULT - ASSESSMENT
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 Admitted for recurrent Upper GI bleed and UTI, s/p failed embolization x1 and multpiple endoscopy attempts. Now s/p laparoscopic total gastrectomy with scar-en-y reconstruction and cholecystectomy (11/20).    Neuro: Morphine, Baclofen pump with morphine, Baclofen 20 po bid, gabapentin 600 tid. Delirium haldol prn Cymbalta 20 bid   HEENT: On valtrex for HSV endophthalmitis, Prednisone eye drops qd   CV: HTN -  restart norvasc if continues to be HTNsive after PO baclofen and neurotin. AAA: BP goal <140 labetalol prn   Resp: NC  GI: CLD, UGI series today   : Russo, monitor I&O. Retained Ureteral stent with recurrent UTI On ceftriaxone for UTI 2/2 enterobacter aerogenes.   PPX: SQH on hold as per Surgical team SCD  ID: Valtrex for HSV endophthalmitis( 11/20-)  Lines:  Right PIV, RIJ ( 11/12-)

## 2019-11-25 NOTE — PROGRESS NOTE ADULT - SUBJECTIVE AND OBJECTIVE BOX
POD: 5  Procedure: total gastrectomy, Deandra en Y reconstruction, cholecystectomy    SUBJECTIVE: Patient seen and examined by chief resident on morning rounds. Patient resting comfortably in bed. Bladder scan early this am showed residual of 450cc so she was straight cathed with 450cc return. BP intermittently elevated over the weekend requiring pushes of IV labetalol.       Vital Signs Last 24 Hrs  T(C): 36.5 (25 Nov 2019 04:57), Max: 37.1 (24 Nov 2019 17:54)  T(F): 97.7 (25 Nov 2019 04:57), Max: 98.7 (24 Nov 2019 17:54)  HR: 72 (25 Nov 2019 03:45) (70 - 82)  BP: 146/66 (25 Nov 2019 03:45) (142/63 - 160/75)  BP(mean): 95 (25 Nov 2019 03:45) (91 - 108)  RR: 18 (25 Nov 2019 03:45) (16 - 18)  SpO2: 96% (25 Nov 2019 03:45) (95% - 96%)    Physical Exam:  General: NAD  Pulmonary: Nonlabored breathing, no respiratory distress  Abdominal: soft, nondistended, mildly tender near incision site with no rebound or guarding, incisions CDI, MEENA with SS output  Extremities: WWP, normal strength, no clubbing/cyanosis/edema  Neuro: A/O x3    Lines/drains/tubes:    I&O's Summary    24 Nov 2019 07:01  -  25 Nov 2019 07:00  --------------------------------------------------------  IN: 1592.4 mL / OUT: 2155 mL / NET: -562.6 mL        LABS:                        10.2   14.59 )-----------( 589      ( 25 Nov 2019 06:01 )             33.6     11-25    139  |  101  |  7   ----------------------------<  101<H>  3.5   |  30  |  0.35<L>    Ca    8.4      25 Nov 2019 06:01  Phos  2.4     11-25  Mg     2.0     11-25          CAPILLARY BLOOD GLUCOSE      POCT Blood Glucose.: 79 mg/dL (25 Nov 2019 06:39)  POCT Blood Glucose.: 81 mg/dL (24 Nov 2019 23:43)  POCT Blood Glucose.: 86 mg/dL (24 Nov 2019 18:38)  POCT Blood Glucose.: 136 mg/dL (24 Nov 2019 11:51)        RADIOLOGY & ADDITIONAL STUDIES: POD: 5  Procedure: subtotal gastrectomy, Deandra en Y reconstruction, cholecystectomy    SUBJECTIVE: Patient seen and examined by chief resident on morning rounds. Patient resting comfortably in bed. Bladder scan early this am showed residual of 450cc so she was straight cathed with 450cc return. BP intermittently elevated over the weekend requiring pushes of IV labetalol.       Vital Signs Last 24 Hrs  T(C): 36.5 (25 Nov 2019 04:57), Max: 37.1 (24 Nov 2019 17:54)  T(F): 97.7 (25 Nov 2019 04:57), Max: 98.7 (24 Nov 2019 17:54)  HR: 72 (25 Nov 2019 03:45) (70 - 82)  BP: 146/66 (25 Nov 2019 03:45) (142/63 - 160/75)  BP(mean): 95 (25 Nov 2019 03:45) (91 - 108)  RR: 18 (25 Nov 2019 03:45) (16 - 18)  SpO2: 96% (25 Nov 2019 03:45) (95% - 96%)    Physical Exam:  General: NAD  Pulmonary: Nonlabored breathing, no respiratory distress  Abdominal: soft, nondistended, mildly tender near incision site with no rebound or guarding, incisions CDI, MEENA with SS output  Extremities: WWP, normal strength, no clubbing/cyanosis/edema  Neuro: A/O x3    Lines/drains/tubes:    I&O's Summary    24 Nov 2019 07:01  -  25 Nov 2019 07:00  --------------------------------------------------------  IN: 1592.4 mL / OUT: 2155 mL / NET: -562.6 mL        LABS:                        10.2   14.59 )-----------( 589      ( 25 Nov 2019 06:01 )             33.6     11-25    139  |  101  |  7   ----------------------------<  101<H>  3.5   |  30  |  0.35<L>    Ca    8.4      25 Nov 2019 06:01  Phos  2.4     11-25  Mg     2.0     11-25          CAPILLARY BLOOD GLUCOSE      POCT Blood Glucose.: 79 mg/dL (25 Nov 2019 06:39)  POCT Blood Glucose.: 81 mg/dL (24 Nov 2019 23:43)  POCT Blood Glucose.: 86 mg/dL (24 Nov 2019 18:38)  POCT Blood Glucose.: 136 mg/dL (24 Nov 2019 11:51)        RADIOLOGY & ADDITIONAL STUDIES: POD: 5  Procedure: subtotal gastrectomy, Deandra en Y reconstruction, cholecystectomy    SUBJECTIVE: Patient seen and examined by chief resident on morning rounds. Patient resting comfortably in bed. Bladder scan early this am showed residual of 450cc so she was straight cathed with 450cc return. BP intermittently elevated over the weekend requiring pushes of IV labetalol.       Vital Signs Last 24 Hrs  T(C): 36.5 (25 Nov 2019 04:57), Max: 37.1 (24 Nov 2019 17:54)  T(F): 97.7 (25 Nov 2019 04:57), Max: 98.7 (24 Nov 2019 17:54)  HR: 72 (25 Nov 2019 03:45) (70 - 82)  BP: 146/66 (25 Nov 2019 03:45) (142/63 - 160/75)  BP(mean): 95 (25 Nov 2019 03:45) (91 - 108)  RR: 18 (25 Nov 2019 03:45) (16 - 18)  SpO2: 96% (25 Nov 2019 03:45) (95% - 96%)    Physical Exam:  General: NAD  Pulmonary: Nonlabored breathing, no respiratory distress  Abdominal: soft, nondistended, mildly tender near incision site with no rebound or guarding, incisions CDI, MEENA with SS output  Extremities: no clubbing/cyanosis/edema  Neuro: A/O x3    Lines/drains/tubes:    I&O's Summary    24 Nov 2019 07:01  -  25 Nov 2019 07:00  --------------------------------------------------------  IN: 1592.4 mL / OUT: 2155 mL / NET: -562.6 mL        LABS:                        10.2   14.59 )-----------( 589      ( 25 Nov 2019 06:01 )             33.6     11-25    139  |  101  |  7   ----------------------------<  101<H>  3.5   |  30  |  0.35<L>    Ca    8.4      25 Nov 2019 06:01  Phos  2.4     11-25  Mg     2.0     11-25          CAPILLARY BLOOD GLUCOSE      POCT Blood Glucose.: 79 mg/dL (25 Nov 2019 06:39)  POCT Blood Glucose.: 81 mg/dL (24 Nov 2019 23:43)  POCT Blood Glucose.: 86 mg/dL (24 Nov 2019 18:38)  POCT Blood Glucose.: 136 mg/dL (24 Nov 2019 11:51)        RADIOLOGY & ADDITIONAL STUDIES:

## 2019-11-25 NOTE — PROGRESS NOTE ADULT - SUBJECTIVE AND OBJECTIVE BOX
24 hr events    Subjective: Patient seen and examined bedside. Doing well. Tolerating solids.      Vital Signs Last 24 Hrs  T(C): 37.2 (25 Nov 2019 13:00), Max: 37.2 (25 Nov 2019 13:00)  T(F): 98.9 (25 Nov 2019 13:00), Max: 98.9 (25 Nov 2019 13:00)  HR: 70 (25 Nov 2019 15:10) (70 - 80)  BP: 146/65 (25 Nov 2019 15:10) (139/64 - 172/68)  BP(mean): 94 (25 Nov 2019 15:10) (91 - 107)  RR: 18 (25 Nov 2019 15:10) (14 - 18)  SpO2: 100% (25 Nov 2019 15:10) (95% - 100%)    I&O's Summary    24 Nov 2019 07:01  -  25 Nov 2019 07:00  --------------------------------------------------------  IN: 1592.4 mL / OUT: 2155 mL / NET: -562.6 mL    25 Nov 2019 07:01  -  25 Nov 2019 16:27  --------------------------------------------------------  IN: 963.2 mL / OUT: 40 mL / NET: 923.2 mL        Physical Exam:  General: NAD  Pulmonary: Nonlabored breathing, no respiratory distress  Cardiovascular: NSR  Abdominal: soft, NT/ND, lap incisions healing well  Extremities: WWP, normal strength, no clubbing/cyanosis/edema, SCDs on  Neuro: A/O x3, no focal deficits, normal sensation    Lines/drains/tubes:    LABS:                        10.2   14.59 )-----------( 589      ( 25 Nov 2019 06:01 )             33.6     11-25    139  |  101  |  7   ----------------------------<  101<H>  3.5   |  30  |  0.35<L>    Ca    8.4      25 Nov 2019 06:01  Phos  2.4     11-25  Mg     2.0     11-25            CAPILLARY BLOOD GLUCOSE      POCT Blood Glucose.: 83 mg/dL (25 Nov 2019 14:42)  POCT Blood Glucose.: 79 mg/dL (25 Nov 2019 06:39)  POCT Blood Glucose.: 81 mg/dL (24 Nov 2019 23:43)  POCT Blood Glucose.: 86 mg/dL (24 Nov 2019 18:38)      RADIOLOGY & ADDITIONAL TESTS:

## 2019-11-26 NOTE — CHART NOTE - NSCHARTNOTEFT_GEN_A_CORE
Admitting Diagnosis:   Patient is a 68y old  Female who presents with a chief complaint of Recurrent UGIB (23 Nov 2019 10:28)      PAST MEDICAL & SURGICAL HISTORY:  Melena: 10/7/2019  Gastrointestinal hemorrhage: 9/28/2019, 9/4/2019, 8/29/2019  Dieulafoy lesion of stomach or duodenum: 10/1/2019  Subdural hematoma, nontraumatic: spontaneous  Dorsalgia of lumbar region: on pain medication /baclofen po and pump  Self-catheterizes urinary bladder  Anemia: chronic  Uveitis  Osteoporosis  PAD (peripheral artery disease)  Hematoma: spinal treated September 2018  Paraplegia: due to spinal hematoma, on wheelchair goes to physical therapy 2 x weekly  Aortic dissection, thoracic: Type A Repaired 2009  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 2018  UTI (urinary tract infection): recurrent  TIA (transient ischemic attack)  HTN (Hypertension)  History of corneal transplant: left corneal transplant on 5/21/2018  Disorder of spine: unthetethering 2 x  Presence of IVC filter: 2014 ?  S/P aortic dissection repair: Type A Dissection repair /2009   descending aortic aneurysm repair 9/2016  H/O Spinal surgery: laminectomies 2014      Current Nutrition Order:  BARICLLIQ (adv. 11/24)    PO Intake: Good (%) [   ]  Fair (50-75%) [   ] Poor (<25%) [   ]  Tolerating clears. Understanding of gradual advancement process.     GI Issues:   S/p total gastrectomy w/ RY reconstruction  Last BM 11/25  Rounded/distended abdomen per flowhseet.     Pain:  Denying pain    Skin Integrity:   Randal score 14  Edema 1+ b/l ankles  Stage II pressure injury on coccyx (healed)      Labs:   11-26    144  |  108  |  5<L>  ----------------------------<  89  3.6   |  27  |  0.32<L>    Ca    8.2<L>      26 Nov 2019 06:36  Phos  3.3     11-26  Mg     1.7     11-26      CAPILLARY BLOOD GLUCOSE      POCT Blood Glucose.: 79 mg/dL (26 Nov 2019 11:26)  POCT Blood Glucose.: 101 mg/dL (26 Nov 2019 07:29)  POCT Blood Glucose.: 56 mg/dL (26 Nov 2019 06:45)  POCT Blood Glucose.: 79 mg/dL (25 Nov 2019 23:59)  POCT Blood Glucose.: 103 mg/dL (25 Nov 2019 17:29)      Medications:  MEDICATIONS  (STANDING):  acyclovir   Oral Tab/Cap 1000 milliGRAM(s) Oral every 8 hours  artificial  tears Solution 1 Drop(s) Both EYES two times a day  atorvastatin 40 milliGRAM(s) Oral at bedtime  baclofen 20 milliGRAM(s) Oral two times a day  Baclofen 480 MICROgram(s)/Day,Morphine 2.4 mG/Day,Baclofen 480 MICROGram(s),Morphine 2.4 milliGRAM(s) 480 MICROGram(s) IntraThecal Continuous Pump  chlorhexidine 0.12% Liquid 15 milliLiter(s) Oral Mucosa every 12 hours  chlorhexidine 2% Cloths 1 Application(s) Topical daily  dextrose 50% Injectable 12.5 Gram(s) IV Push once  dextrose 50% Injectable 25 Gram(s) IV Push once  DULoxetine 20 milliGRAM(s) Oral two times a day  gabapentin   Solution 600 milliGRAM(s) Oral three times a day  insulin lispro (HumaLOG) corrective regimen sliding scale   SubCutaneous every 6 hours  labetalol 200 milliGRAM(s) Oral daily  lactated ringers. 1000 milliLiter(s) (90 mL/Hr) IV Continuous <Continuous>  pantoprazole  Injectable 40 milliGRAM(s) IV Push two times a day  prednisoLONE acetate 1% Suspension 1 Drop(s) Both EYES four times a day  sodium chloride 0.9% lock flush 3 milliLiter(s) IV Push every 8 hours  sodium chloride 2% Ophthalmic Solution 1 Drop(s) Both EYES daily    MEDICATIONS  (PRN):  morphine  - Injectable 2 milliGRAM(s) IV Push every 4 hours PRN Moderate Pain (4 - 6)  morphine  - Injectable 4 milliGRAM(s) IV Push every 4 hours PRN Severe Pain (7 - 10)      Weight:  (11/12) 106lbs (48 kg)   (11/15) 134.2lbs (60.9kg) bed   (11/21) 141.4lbs (64.3kg) bed  *unable to obtain updated weight 2/2 bedscale error    Weight Change:   Nutrition Focused Physical Exam: Completed [ X-11/13/19  ]  Not Pertinent [   ]  From 11/13/19: If admitted weight is accurate, this would indicate a 16lb unintentional wt loss (13% wt change) x2 weeks.   Pt noted to have moderate muscle wasting in clavicular region. Moderate protein calorie malnutrition suspected.     Estimated energy needs:   Height: 5'7" Weight: 105lbs, IBW 135lbs+/-10%, %IBW 77%, BMI 16.4   ABW used for calculations as pt is <80% IBW. Needs adjusted for suspected malnutrition, +pressure injury, underweight, post-op healing  (25-30 kcal/kg): 3950-9718 kcal/day  (1.4-1.6 g/kg): 66-76 g protein/day  (30-35 ml/kg): 4025-3302 ml/day    Subjective:   Pt is a 68 y.o F h/o aortic dissection s/p multiple repairs, spinal hematoma resulting in paraplegia, and embolization of splenic and L gastric arteries for GI bleeding, now admitted for GI bleed. Pt was admitted last week where she required splenic and L gastric arterial embolization (11/7). After d/c pt presented at [facility] for AMS and was transferred to [facility] after being found w/ hypotension and anemia. Pt w/ acute drop in hgb and HD instability following melenic stool. S/p bedside EGD 11/12 showing large amounts of blood in the stomach but no active bleeding. Pt also w/ notable large amounts of stool and rectal distension on CT. Pt was initially intubated for airway protection, now extubated. Pt was transferred from CT surgery to MICU for further management.  Was planned for EUS which was deferred 11/15-11/18. On 11/18, pt became tachy, hypotensive, and noted to have melanotic stool. Hgb dropping again, was given 2U pRBC and placed on levo gtt. Bedside EGD showing significant amounts of clotted blood in stomach w/ no active bleed. Consensus between GI, IR, vascular and gen surgery was for total gastrectomy. Pt now POD6 s/p total gastrectomy w/ Deandra en Y reconstruction and cholecystectomy. Admitted to SICU team for post-op management. Was extubated 11/22 and weaned off sedatives 11/23. TPN was initiated 11/23 for nutrition support d/t overall insufficient intake this admission.  Pt had been primarily NPO since admission except when on a mechanical soft diet from 11/15-18. This indicates at least 13 days of NPO status. Pt is suspected to have moderate protein calorie malnutrition 2/2 13% wt change x2 weeks and visible muscle wasting in temporal and clavicular region. Noted, pt is also at high risk for further unintentional wt loss s/p total gastrectomy. Pt stepped down to 8LA on 11/23. UGI completed 11/25- negative for leak. Pt was advanced to [facility]. Pt seen in room, resting in bed. Tolerating clear liquids however had experienced some loose stool earlier today. Denies N/V, or abdominal discomfort.  Discussed purpose of slow diet advancement- RD to f/u for more in-depth edu on nutrition s/p total gastrectomy to avoid further wt loss/dumping. Recommend advancing to Phase 1 Full Liquids once medically feasible. RD to follow.     Previous Nutrition Diagnosis:  Moderate protein-calorie malnutrition RT unclear etiology AEB 13% wt loss x 2 weeks, NFPE findings include moderate muscle wasting.     Active [ X  ]  Resolved [   ]    Goal: Pt to consistently meet % of estimated needs PO     Recommendations:  1) Recommend advancing to Phase 1 Full Liquids once medically feasible. This will provide the patient w/ Prostats and Ensure High Protein TID (480kcal, 48g pro).   2) Monitor for s/s intolerance. Maintain aspiration precautions at all times.   3) Monitor POCT BG Q6H   4) Obtain and monitor wt trends.   5) Recommend chewable MVI, B12, OsCal. Iron supplementation per MD.   *d/w team.     Education:   purpose of gradual advancement. will f/u tomorrow w/ more indepth edu on nutrition therapy s/p total gastrectomy     Risk Level: High [ X ] Moderate [   ] Low [   ].

## 2019-11-26 NOTE — PROGRESS NOTE ADULT - ASSESSMENT
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 Admitted for recurrent Upper GI bleed and UTI, s/p failed embolization x1 and multpiple endoscopy attempts. Now s/p laparoscopic total gastrectomy with scar-en-y reconstruction and cholecystectomy (11/20). Clinically improving with advancement of diet. UGI series from yesterday with no leak.     Neuro: Morphine, Baclofen pump with morphine, Baclofen 20 po bid, gabapentin 600 tid. Delirium haldol prn Cymbalta 20 bid   HEENT: On valtrex for HSV endophthalmitis, Prednisone eye drops qd   CV: HTN - AAA: BP goal <140 labetalol prn   Resp: NC  GI: CLD  : Russo, monitor I&O. Retained Ureteral stent with recurrent UTI On ceftriaxone for UTI 2/2 enterobacter aerogenes.   PPX: SQH on hold, SCDs  ID: Valtrex for HSV endophthalmitis( 11/20-)  Lines:  RIJ ( 11/12-) 68F with UGIB, 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 Admitted for recurrent Upper GI bleed and UTI, s/p failed embolization x1 and multiple endoscopy attempts. Now s/p laparoscopic total gastrectomy with scar-en-y reconstruction and cholecystectomy (11/20). Clinically improving with advancement of diet. UGI series from yesterday with no leak.     Neuro: Morphine, Baclofen pump with morphine, Baclofen 20 po bid, gabapentin 600 tid. Delirium haldol prn Cymbalta 20 bid   HEENT: On valtrex for HSV endophthalmitis, Prednisone eye drops qd   CV: HTN - AAA: BP goal <140 labetalol prn   Resp: NC  GI: CLD  : Russo, monitor I&O. Retained Ureteral stent with recurrent UTI On ceftriaxone for UTI 2/2 enterobacter aerogenes.   PPX: SQH on hold, SCDs  ID: Valtrex for HSV endophthalmitis( 11/20-)  Lines:  RIJ ( 11/12-)

## 2019-11-26 NOTE — PROGRESS NOTE ADULT - SUBJECTIVE AND OBJECTIVE BOX
POD: 6  Procedure: subtotal gastrectomy, Deandra en Y reconstruction, cholecystectomy    SUBJECTIVE: Patient seen and examined by chief resident on morning rounds. Resting comfortably in bed with no complaints. Had 2 loose bowel movements last night. Tolerating clears. Continues to require straight cath every 6 hours. No CP, SOB, dizziness, lightheadedness.       Vital Signs Last 24 Hrs  T(C): 36.7 (26 Nov 2019 05:42), Max: 37.2 (25 Nov 2019 13:00)  T(F): 98.1 (26 Nov 2019 05:42), Max: 98.9 (25 Nov 2019 13:00)  HR: 74 (26 Nov 2019 03:30) (66 - 90)  BP: 159/72 (26 Nov 2019 03:30) (132/68 - 172/68)  BP(mean): 104 (26 Nov 2019 03:30) (79 - 108)  RR: 18 (26 Nov 2019 03:30) (14 - 18)  SpO2: 95% (26 Nov 2019 03:30) (94% - 100%)    Physical Exam:  General: NAD  Pulmonary: Nonlabored breathing, no respiratory distress  Abdominal: soft, nondistended, mildly tender near incision site with no rebound or guarding, incisions CDI, MEENA with SS output dressing changed on rounds  Extremities: no clubbing/cyanosis/edema  Neuro: A/O x3    Lines/drains/tubes:    I&O's Summary    25 Nov 2019 07:01  -  26 Nov 2019 07:00  --------------------------------------------------------  IN: 2989.8 mL / OUT: 935 mL / NET: 2054.8 mL        LABS:                        10.2   14.59 )-----------( 589      ( 25 Nov 2019 06:01 )             33.6     11-25    139  |  101  |  7   ----------------------------<  101<H>  3.5   |  30  |  0.35<L>    Ca    8.4      25 Nov 2019 06:01  Phos  2.4     11-25  Mg     2.0     11-25          CAPILLARY BLOOD GLUCOSE      POCT Blood Glucose.: 56 mg/dL (26 Nov 2019 06:45)  POCT Blood Glucose.: 79 mg/dL (25 Nov 2019 23:59)  POCT Blood Glucose.: 103 mg/dL (25 Nov 2019 17:29)  POCT Blood Glucose.: 83 mg/dL (25 Nov 2019 14:42)        RADIOLOGY & ADDITIONAL STUDIES:

## 2019-11-26 NOTE — PROVIDER CONTACT NOTE (OTHER) - BACKGROUND
Pt is day 19 s/p IR embolization of splenic and L-gastric arteries, is day 6 s/p total gastrectomy + scar en Y reconstruction w/ cholecystectomy

## 2019-11-27 NOTE — PROGRESS NOTE ADULT - SUBJECTIVE AND OBJECTIVE BOX
No acute events overnight. From urologic perspective, can be discharged. Patient should followup with outpatient urologist Dr. Lares within 1-2 weeks jemma for stent and definitive stone management. Care per primary team. d/w attending.     Vital Signs Last 24 Hrs  T(C): 36.3 (27 Nov 2019 09:00), Max: 36.9 (26 Nov 2019 17:18)  T(F): 97.4 (27 Nov 2019 09:00), Max: 98.4 (26 Nov 2019 17:18)  HR: 54 (27 Nov 2019 07:58) (54 - 74)  BP: 116/56 (27 Nov 2019 07:58) (116/56 - 173/77)  BP(mean): 81 (27 Nov 2019 07:58) (81 - 111)  RR: 15 (27 Nov 2019 07:58) (15 - 16)  SpO2: 99% (27 Nov 2019 07:58) (97% - 99%)                          10.9   14.88 )-----------( 569      ( 26 Nov 2019 07:38 )             35.4   26 Nov 2019 06:36    144    |  108    |  5      ----------------------------<  89     3.6     |  27     |  0.32     Ca    8.2        26 Nov 2019 06:36  Phos  3.3       26 Nov 2019 06:36  Mg     1.7       26 Nov 2019 06:36

## 2019-11-27 NOTE — PROVIDER CONTACT NOTE (OTHER) - ASSESSMENT
FS 53, pt asymptomatic, 1/2 amp D50 given as ordered. Repeat FS 58, another 1/2 amp given. Repeat 
MEENA output only 80 ml since arrived back from OR. Large amount of serosanguinous drainage noted around MEENA site.
Patient is on propofol at 50mcg/kg/min and fentanyl at 2.7 mcg/kg/min. She is currently a RASS of -2 and following commads when aroused. She is having jerking movements in her feet when they dorsiflexed that seem like clonus.
Pt asymptomatic, c/o consistent discomfort in abdomen r/t surgery, expresses concern r/t loose stools

## 2019-11-27 NOTE — PROVIDER CONTACT NOTE (OTHER) - RECOMMENDATIONS
D50 as ordered
Assess drain to ensure it is functioning.
To see if baclofen pump is working and give alternatives to PO baclofen and gabapentin since she missed multiple doses.
recommended PA view urine in person

## 2019-11-27 NOTE — PROGRESS NOTE ADULT - SUBJECTIVE AND OBJECTIVE BOX
OVERNIGHT EVENTS: : duplex of LE bilaterally shows no DVTs bilaterally, MEENA and TLC removed, 2 PIV lines (20 gauge) inserted in right arm, hydralazine 10mg for sbp 180s,   11/26: one episode of loose stool, urology consult tomorrow     STATUS POST:    11/7: IR embolization of splenic and left gastric arteries  11/20: total gastrectomy, Deandra en Y reconstruction, cholecystectomy, 10 Fr drain over GJ, , IVF 1800, 1U pRBC,     SUBJECTIVE :Patient seen and examined by chief resident on morning rounds. Resting comfortably in bed with no complaints. Continues to require straight cath every 6 hours. Patient denies nausea ,emesis , CP, SOB, dizziness and lightheadedness.    acyclovir   Oral Tab/Cap 1000 milliGRAM(s) Oral every 8 hours  amLODIPine   Tablet 5 milliGRAM(s) Oral daily  labetalol 200 milliGRAM(s) Oral daily      Vital Signs Last 24 Hrs  T(C): 36.3 (27 Nov 2019 09:00), Max: 36.9 (26 Nov 2019 17:18)  T(F): 97.4 (27 Nov 2019 09:00), Max: 98.4 (26 Nov 2019 17:18)  HR: 54 (27 Nov 2019 07:58) (54 - 74)  BP: 116/56 (27 Nov 2019 07:58) (116/56 - 173/77)  BP(mean): 81 (27 Nov 2019 07:58) (81 - 111)  RR: 15 (27 Nov 2019 07:58) (15 - 16)  SpO2: 99% (27 Nov 2019 07:58) (97% - 99%)  I&O's Detail    26 Nov 2019 07:01  -  27 Nov 2019 07:00  --------------------------------------------------------  IN:    lactated ringers.: 2070 mL    Solution: 100 mL  Total IN: 2170 mL    OUT:    Bulb: 95 mL    Intermittent Catheterization - Urethral: 450 mL    Voided: 2500 mL  Total OUT: 3045 mL    Total NET: -875 mL          General: NAD, resting comfortably in bed  C/V: NSR  Pulm: Nonlabored breathing, no respiratory distress  Abd: Soft, non-distended, TTP around incision site, incision clean dry and intact  Extrem: WWP, no edema, SCDs in place      LABS:                        10.9   14.88 )-----------( 569      ( 26 Nov 2019 07:38 )             35.4     11-26    144  |  108  |  5<L>  ----------------------------<  89  3.6   |  27  |  0.32<L>    Ca    8.2<L>      26 Nov 2019 06:36  Phos  3.3     11-26  Mg     1.7     11-26            RADIOLOGY & ADDITIONAL STUDIES:

## 2019-11-27 NOTE — PROVIDER CONTACT NOTE (OTHER) - ACTION/TREATMENT ORDERED:
Pt treated with D50.
Continue propofol since it will help with baclofen withdrawal and will re-evaluate for need for benzodiazepine's.
PA stated would attend in person
PA will come down to assess drain for patency.

## 2019-11-27 NOTE — PROGRESS NOTE ADULT - ASSESSMENT
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 Admitted for recurrent Upper GI bleed and UTI, s/p failed embolization x1 and multiple endoscopy attempts. Now s/p laparoscopic total gastrectomy with scar-en-y reconstruction and cholecystectomy (11/20). Clinically improving with advancement of diet. UGI series from yesterday with no leak.     Neuro: Morphine, Baclofen pump with morphine, Baclofen 20 po bid, gabapentin 600 tid. Delirium haldol prn Cymbalta 20 bid   HEENT: On valtrex for HSV endophthalmitis, Prednisone eye drops qd   CV: HTN - AAA: BP goal <140 labetalol prn   Resp: NC  GI: CLD  : Russo, monitor I&O. Retained Ureteral stent with recurrent UTI On ceftriaxone for UTI 2/2 enterobacter aerogenes.   PPX: SQH on hold, SCDs  ID: Valtrex for HSV endophthalmitis( 11/20-)  Lines:  RIJ ( 11/12-)   f/u urology consult

## 2019-11-27 NOTE — CHART NOTE - NSCHARTNOTEFT_GEN_A_CORE
Admitting Diagnosis:   Patient is a 68y old  Female who presents with a chief complaint of Recurrent UGIB (23 Nov 2019 10:28)      PAST MEDICAL & SURGICAL HISTORY:  Melena: 10/7/2019  Gastrointestinal hemorrhage: 9/28/2019, 9/4/2019, 8/29/2019  Dieulafoy lesion of stomach or duodenum: 10/1/2019  Subdural hematoma, nontraumatic: spontaneous  Dorsalgia of lumbar region: on pain medication /baclofen po and pump  Self-catheterizes urinary bladder  Anemia: chronic  Uveitis  Osteoporosis  PAD (peripheral artery disease)  Hematoma: spinal treated September 2018  Paraplegia: due to spinal hematoma, on wheelchair goes to physical therapy 2 x weekly  Aortic dissection, thoracic: Type A Repaired 2009  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 2018  UTI (urinary tract infection): recurrent  TIA (transient ischemic attack)  HTN (Hypertension)  History of corneal transplant: left corneal transplant on 5/21/2018  Disorder of spine: unthetethering 2 x  Presence of IVC filter: 2014 ?  S/P aortic dissection repair: Type A Dissection repair /2009   descending aortic aneurysm repair 9/2016  H/O Spinal surgery: laminectomies 2014      Current Nutrition Order:  Phase 1 Bariatric Full Liquids (11/27)  BARICLLIQ (adv. 11/24)    PO Intake: Good (%) [   ]  Fair (50-75%) [   ] Poor (<25%) [   ]  Consumed only clears this am. Please ensure pt is receiving Ensure HP w/ meals.     GI Issues:   S/p total gastrectomy w/ RY reconstruction  Last BM 11/25  Rounded/distended abdomen per flowhseet.     Pain:  Denying pain    Skin Integrity:   Randal score 14  Edema 1+ b/l ankles  Stage II pressure injury on coccyx (healed)      Labs:   11-27    143  |  109<H>  |  3<L>  ----------------------------<  112<H>  3.9   |  26  |  0.33<L>    Ca    8.4      27 Nov 2019 10:51  Phos  3.0     11-27  Mg     1.9     11-27      CAPILLARY BLOOD GLUCOSE      POCT Blood Glucose.: 106 mg/dL (27 Nov 2019 11:56)  POCT Blood Glucose.: 82 mg/dL (27 Nov 2019 07:19)  POCT Blood Glucose.: 62 mg/dL (27 Nov 2019 06:27)  POCT Blood Glucose.: 101 mg/dL (27 Nov 2019 02:12)  POCT Blood Glucose.: 58 mg/dL (27 Nov 2019 01:07)  POCT Blood Glucose.: 53 mg/dL (27 Nov 2019 00:13)  POCT Blood Glucose.: 87 mg/dL (26 Nov 2019 16:43)      Medications:  MEDICATIONS  (STANDING):  acyclovir   Oral Tab/Cap 1000 milliGRAM(s) Oral every 8 hours  amLODIPine   Tablet 5 milliGRAM(s) Oral daily  artificial  tears Solution 1 Drop(s) Both EYES two times a day  atorvastatin 40 milliGRAM(s) Oral at bedtime  baclofen 20 milliGRAM(s) Oral two times a day  Baclofen 480 MICROgram(s)/Day,Morphine 2.4 mG/Day,Baclofen 480 MICROGram(s),Morphine 2.4 milliGRAM(s) 480 MICROGram(s) IntraThecal Continuous Pump  chlorhexidine 0.12% Liquid 15 milliLiter(s) Oral Mucosa every 12 hours  chlorhexidine 2% Cloths 1 Application(s) Topical daily  dextrose 5% + sodium chloride 0.45% 1000 milliLiter(s) (90 mL/Hr) IV Continuous <Continuous>  dextrose 50% Injectable 12.5 Gram(s) IV Push once  dextrose 50% Injectable 25 Gram(s) IV Push once  DULoxetine 20 milliGRAM(s) Oral two times a day  gabapentin   Solution 600 milliGRAM(s) Oral three times a day  insulin lispro (HumaLOG) corrective regimen sliding scale   SubCutaneous every 6 hours  labetalol 200 milliGRAM(s) Oral daily  pantoprazole  Injectable 40 milliGRAM(s) IV Push two times a day  prednisoLONE acetate 1% Suspension 1 Drop(s) Both EYES four times a day  sodium chloride 0.9% lock flush 3 milliLiter(s) IV Push every 8 hours  sodium chloride 2% Ophthalmic Solution 1 Drop(s) Both EYES daily    MEDICATIONS  (PRN):  morphine  - Injectable 2 milliGRAM(s) IV Push every 4 hours PRN Moderate Pain (4 - 6)  morphine  - Injectable 4 milliGRAM(s) IV Push every 4 hours PRN Severe Pain (7 - 10)      Weight:  (11/12) 106lbs (48 kg)   (11/15) 134.2lbs (60.9kg) bed   (11/21) 141.4lbs (64.3kg) bed  *unable to obtain updated weight 2/2 bedscale error    Weight Change:   Nutrition Focused Physical Exam: Completed [ X-11/13/19  ]  Not Pertinent [   ]  From 11/13/19: If admitted weight is accurate, this would indicate a 16lb unintentional wt loss (13% wt change) x2 weeks.   Pt noted to have moderate muscle wasting in clavicular region. Moderate protein calorie malnutrition suspected.     Estimated energy needs:   Height: 5'7" Weight: 105lbs, IBW 135lbs+/-10%, %IBW 77%, BMI 16.4   ABW used for calculations as pt is <80% IBW. Needs adjusted for suspected malnutrition, +pressure injury, underweight, post-op healing  (25-30 kcal/kg): 2091-9728 kcal/day  (1.4-1.6 g/kg): 66-76 g protein/day  (30-35 ml/kg): 8564-3789 ml/day    Subjective:   Pt is a 68 y.o F h/o aortic dissection s/p multiple repairs, spinal hematoma resulting in paraplegia, and embolization of splenic and L gastric arteries for GI bleeding, now admitted for GI bleed. Pt was admitted last week where she required splenic and L gastric arterial embolization (11/7). After d/c pt presented at [facility] for AMS and was transferred to [facility] after being found w/ hypotension and anemia. Pt w/ acute drop in hgb and HD instability following melenic stool. S/p bedside EGD 11/12 showing large amounts of blood in the stomach but no active bleeding. Pt also w/ notable large amounts of stool and rectal distension on CT. Pt was initially intubated for airway protection, now extubated. Pt was transferred from CT surgery to MICU for further management.  Was planned for EUS which was deferred 11/15-11/18. On 11/18, pt became tachy, hypotensive, and noted to have melanotic stool. Hgb dropping again, was given 2U pRBC and placed on levo gtt. Bedside EGD showing significant amounts of clotted blood in stomach w/ no active bleed. Consensus between GI, IR, vascular and gen surgery was for total gastrectomy. Pt now POD6 s/p total gastrectomy w/ Deandra en Y reconstruction and cholecystectomy. Admitted to SICU team for post-op management. Was extubated 11/22 and weaned off sedatives 11/23. TPN was initiated 11/23 for nutrition support d/t overall insufficient intake this admission.  Pt had been primarily NPO since admission except when on a mechanical soft diet from 11/15-18. This indicates at least 13 days of NPO status. Pt is suspected to have moderate protein calorie malnutrition 2/2 13% wt change x2 weeks and visible muscle wasting in temporal and clavicular region. Noted, pt is also at high risk for further unintentional wt loss s/p total gastrectomy. Pt stepped down to 8LA on 11/23. UGI completed 11/25- negative for leak. Pt was advanced to BARICLLIQ 11/24 and then to Phase 1 Bariatric Full Liquids this am 11/27. Pt seen in room, resting in bed. Consumed tea and broth this am w/ good tolerance. Denies N/V, or abdominal discomfort.  Discussed purpose of slow diet advancement- RD provided more indepth edu on nutrition s/p total gastrectomy to avoid further wt loss/dumping. Pt reports difficulty weighing herself as she cannot stand on a scale- discussed other strategies for visualizing weight changes. Recommend advancing to Phase 2 Pureed/soft once medically feasible. D/w MD planned diet for d/c- team unclear at this point. RD to follow.     Previous Nutrition Diagnosis:  Moderate protein-calorie malnutrition RT unclear etiology AEB 13% wt loss x 2 weeks, NFPE findings include moderate muscle wasting.     Active [ X  ]  Resolved [   ]    Goal: Pt to consistently meet % of estimated needs PO     Recommendations:  1) Recommend advancing to Phase 2 pureed/soft once medically feasible. This will provide the patient w/ Greek yogurt, Prostats and Ensure High Protein TID (480kcal, 48g pro).   2) Monitor POCT BG Q6H   3) Obtain and monitor wt trends.   4) Recommend chewable MVI, B12, OsCal. Iron supplementation per MD.   *d/w team.     Education:   purpose of gradual advancement. Provided edu on nutrition therapy s/p total gastrectomy. RD to f/u w/ 1 week full liquid meal plan.     Risk Level: High [ X ] Moderate [   ] Low [   ].

## 2019-11-27 NOTE — PHARMACY COMMUNICATION NOTE - COMMENTS
Refill of Morphine/Baclofen 20 mL Syringe was transferred to Pump on 11/27/19 (by MD Katheryn Agee)    Compounded by: [facility]  Order ID: #[id number]  Rx Number: #[id number]  Drugs/concentrations: PF Morphine 10 mg/mL; PF Baclofen 2,000 mcg/mL  Final volume: 20 mL of PF NS  Alarm Date: 2/10/20 as per [employer] representative (Anastasia Moss)

## 2019-11-28 NOTE — PROGRESS NOTE ADULT - ASSESSMENT
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 Admitted for recurrent Upper GI bleed and UTI, s/p failed embolization x1 and multiple endoscopy attempts. Now s/p laparoscopic total gastrectomy with scar-en-y reconstruction and cholecystectomy (11/20). Clinically improving with advancement of diet. UGI series with no leak.     Neuro: Morphine, Baclofen pump with morphine, Baclofen 20 po bid, gabapentin 600 tid. Delirium haldol prn Cymbalta 20 bid   HEENT: On valtrex for HSV endophthalmitis, Prednisone eye drops qd   CV: HTN - AAA: BP goal <140 labetalol prn   Resp: NC  GI: FLD  : Primafit, monitor I&O. Retained Ureteral stent with recurrent UTI On ceftriaxone for UTI 2/2 enterobacter aerogenes.   PPX: SQH on hold, SCDs  ID: Valtrex for HSV endophthalmitis( 11/20-)  Lines:  RIJ ( 11/12-)

## 2019-11-28 NOTE — PROGRESS NOTE ADULT - SUBJECTIVE AND OBJECTIVE BOX
POD: 8   Procedure: subtotal gastrectomy, cholecystectomy    SUBJECTIVE: Patient seen and examined by chief resident on morning rounds. Resting comfortably in bed with no complaints. Pain well controlled, tolerating full liquid diet, voiding through primafit, having soft bowel movements.       Vital Signs Last 24 Hrs  T(C): 36.3 (28 Nov 2019 04:57), Max: 36.8 (27 Nov 2019 18:00)  T(F): 97.3 (28 Nov 2019 04:57), Max: 98.3 (27 Nov 2019 18:00)  HR: 58 (28 Nov 2019 05:17) (58 - 76)  BP: 138/65 (28 Nov 2019 05:17) (117/56 - 170/79)  BP(mean): 94 (28 Nov 2019 05:17) (81 - 117)  RR: 18 (28 Nov 2019 05:17) (16 - 18)  SpO2: 96% (28 Nov 2019 00:11) (91% - 100%)    Physical Exam:  General: NAD  Pulmonary: Nonlabored breathing, no respiratory distress  Abdominal: soft, nondistended, mildly tender near incision site with no rebound or guarding, incisions CDI  Extremities: WWP, normal strength, no clubbing/cyanosis/edema  Neuro: A/O x3    Lines/drains/tubes:    I&O's Summary    27 Nov 2019 07:01  -  28 Nov 2019 07:00  --------------------------------------------------------  IN: 2430 mL / OUT: 2000 mL / NET: 430 mL        LABS:                        10.1   10.60 )-----------( 599      ( 28 Nov 2019 06:16 )             33.8     11-28    144  |  111<H>  |  2<L>  ----------------------------<  120<H>  4.2   |  26  |  0.40<L>    Ca    8.4      28 Nov 2019 06:16  Phos  2.8     11-28  Mg     1.8     11-28          CAPILLARY BLOOD GLUCOSE      POCT Blood Glucose.: 91 mg/dL (28 Nov 2019 06:12)  POCT Blood Glucose.: 85 mg/dL (28 Nov 2019 00:12)  POCT Blood Glucose.: 76 mg/dL (27 Nov 2019 16:11)  POCT Blood Glucose.: 106 mg/dL (27 Nov 2019 11:56)        RADIOLOGY & ADDITIONAL STUDIES:

## 2019-11-29 NOTE — PROGRESS NOTE ADULT - SUBJECTIVE AND OBJECTIVE BOX
SUBJECTIVE: Patient seen and examined bedside by chief resident.    acyclovir   Oral Tab/Cap 1000 milliGRAM(s) Oral every 8 hours  amLODIPine   Tablet 5 milliGRAM(s) Oral daily  labetalol 200 milliGRAM(s) Oral daily    MEDICATIONS  (PRN):  morphine  - Injectable 2 milliGRAM(s) IV Push every 4 hours PRN Moderate Pain (4 - 6)  morphine  - Injectable 4 milliGRAM(s) IV Push every 4 hours PRN Severe Pain (7 - 10)      I&O's Detail    2019 07:01  -  2019 07:00  --------------------------------------------------------  IN:    Oral Fluid: 500 mL  Total IN: 500 mL    OUT:    Voided: 650 mL  Total OUT: 650 mL    Total NET: -150 mL          Vital Signs Last 24 Hrs  T(C): 36.4 (2019 05:47), Max: 36.8 (2019 13:49)  T(F): 97.6 (2019 05:47), Max: 98.3 (2019 13:49)  HR: 81 (2019 05:47) (64 - 94)  BP: 147/67 (2019 05:47) (102/69 - 166/78)  BP(mean): 105 (2019 11:58) (94 - 105)  RR: 18 (2019 05:47) (16 - 20)  SpO2: 99% (2019 05:47) (96% - 99%)    Physical Exam:  General: NAD  Pulmonary: Nonlabored breathing, no respiratory distress  Abdominal: soft, nondistended, mildly tender near incision site with no rebound or guarding, incisions CDI  Extremities: WWP, normal strength, no clubbing/cyanosis/edema  Neuro: A/O x3    LABS:                        9.7    17.47 )-----------( 538      ( 2019 00:37 )             32.4     -    144  |  111<H>  |  2<L>  ----------------------------<  120<H>  4.2   |  26  |  0.40<L>    Ca    8.4      2019 06:16  Phos  2.8     -  Mg     1.8             Urinalysis Basic - ( 2019 13:34 )    Color: Yellow / Appearance: Clear / S.010 / pH: x  Gluc: x / Ketone: NEGATIVE  / Bili: Negative / Urobili: 0.2 E.U./dL   Blood: x / Protein: NEGATIVE mg/dL / Nitrite: NEGATIVE   Leuk Esterase: Large / RBC: < 5 /HPF / WBC Many /HPF   Sq Epi: x / Non Sq Epi: 0-5 /HPF / Bacteria: Present /HPF        RADIOLOGY & ADDITIONAL STUDIES: SUBJECTIVE: Patient seen and examined bedside by chief resident.    O/N: Awaiting urine culture. SBP 160s, gave labetalol 10mg IVP. Repeat /69. BP improved to 138/88. 1 episode of coughing bright red blood, only c/o nausea. Stat CBC stable (h/h: 9.7/32.4).    acyclovir   Oral Tab/Cap 1000 milliGRAM(s) Oral every 8 hours  amLODIPine   Tablet 5 milliGRAM(s) Oral daily  labetalol 200 milliGRAM(s) Oral daily    MEDICATIONS  (PRN):  morphine  - Injectable 2 milliGRAM(s) IV Push every 4 hours PRN Moderate Pain (4 - 6)  morphine  - Injectable 4 milliGRAM(s) IV Push every 4 hours PRN Severe Pain (7 - 10)      I&O's Detail    2019 07:01  -  2019 07:00  --------------------------------------------------------  IN:    Oral Fluid: 500 mL  Total IN: 500 mL    OUT:    Voided: 650 mL  Total OUT: 650 mL    Total NET: -150 mL          Vital Signs Last 24 Hrs  T(C): 36.4 (2019 05:47), Max: 36.8 (2019 13:49)  T(F): 97.6 (2019 05:47), Max: 98.3 (2019 13:49)  HR: 81 (2019 05:47) (64 - 94)  BP: 147/67 (2019 05:47) (102/69 - 166/78)  BP(mean): 105 (2019 11:58) (94 - 105)  RR: 18 (2019 05:47) (16 - 20)  SpO2: 99% (2019 05:47) (96% - 99%)    Physical Exam:  General: NAD  Pulmonary: Nonlabored breathing, no respiratory distress  Abdominal: soft, nondistended, mildly tender near incision site with no rebound or guarding, incisions CDI  Extremities: WWP, normal strength, no clubbing/cyanosis/edema  Neuro: A/O x3    LABS:                        9.7    17.47 )-----------( 538      ( 2019 00:37 )             32.4         144  |  111<H>  |  2<L>  ----------------------------<  120<H>  4.2   |  26  |  0.40<L>    Ca    8.4      2019 06:16  Phos  2.8       Mg     1.8             Urinalysis Basic - ( 2019 13:34 )    Color: Yellow / Appearance: Clear / S.010 / pH: x  Gluc: x / Ketone: NEGATIVE  / Bili: Negative / Urobili: 0.2 E.U./dL   Blood: x / Protein: NEGATIVE mg/dL / Nitrite: NEGATIVE   Leuk Esterase: Large / RBC: < 5 /HPF / WBC Many /HPF   Sq Epi: x / Non Sq Epi: 0-5 /HPF / Bacteria: Present /HPF        RADIOLOGY & ADDITIONAL STUDIES: SUBJECTIVE: Patient seen and examined bedside by chief resident. No more bleeding since last PM. Pain controlled, tolerating diet. +BM.    O/N: Awaiting urine culture. SBP 160s, gave labetalol 10mg IVP. Repeat /69. BP improved to 138/88. 1 episode of coughing bright red blood, only c/o nausea. Stat CBC stable (h/h: 9.7/32.4).    acyclovir   Oral Tab/Cap 1000 milliGRAM(s) Oral every 8 hours  amLODIPine   Tablet 5 milliGRAM(s) Oral daily  labetalol 200 milliGRAM(s) Oral daily    MEDICATIONS  (PRN):  morphine  - Injectable 2 milliGRAM(s) IV Push every 4 hours PRN Moderate Pain (4 - 6)  morphine  - Injectable 4 milliGRAM(s) IV Push every 4 hours PRN Severe Pain (7 - 10)      I&O's Detail    2019 07:01  -  2019 07:00  --------------------------------------------------------  IN:    Oral Fluid: 500 mL  Total IN: 500 mL    OUT:    Voided: 650 mL  Total OUT: 650 mL    Total NET: -150 mL          Vital Signs Last 24 Hrs  T(C): 36.4 (2019 05:47), Max: 36.8 (2019 13:49)  T(F): 97.6 (2019 05:47), Max: 98.3 (2019 13:49)  HR: 81 (2019 05:47) (64 - 94)  BP: 147/67 (2019 05:47) (102/69 - 166/78)  BP(mean): 105 (2019 11:58) (94 - 105)  RR: 18 (2019 05:47) (16 - 20)  SpO2: 99% (2019 05:47) (96% - 99%)    Physical Exam:  General: NAD  Pulmonary: Nonlabored breathing, no respiratory distress  Abdominal: soft, nondistended, mildly TTP near incision site with no rebound or guarding, incisions CDI  Extremities: WWP, normal strength, no clubbing/cyanosis/edema  Neuro: A/O x3    LABS:                        9.7    17.47 )-----------( 538      ( 2019 00:37 )             32.4         144  |  111<H>  |  2<L>  ----------------------------<  120<H>  4.2   |  26  |  0.40<L>    Ca    8.4      2019 06:16  Phos  2.8       Mg     1.8             Urinalysis Basic - ( 2019 13:34 )    Color: Yellow / Appearance: Clear / S.010 / pH: x  Gluc: x / Ketone: NEGATIVE  / Bili: Negative / Urobili: 0.2 E.U./dL   Blood: x / Protein: NEGATIVE mg/dL / Nitrite: NEGATIVE   Leuk Esterase: Large / RBC: < 5 /HPF / WBC Many /HPF   Sq Epi: x / Non Sq Epi: 0-5 /HPF / Bacteria: Present /HPF        RADIOLOGY & ADDITIONAL STUDIES:

## 2019-11-29 NOTE — PROGRESS NOTE ADULT - ASSESSMENT
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 Admitted for recurrent Upper GI bleed and UTI, s/p failed embolization x1 and multiple endoscopy attempts. Now s/p laparoscopic total gastrectomy with scar-en-y reconstruction and cholecystectomy (11/20). Clinically improving with advancement of diet. UGI series with no leak.     Neuro: Morphine, Baclofen pump with morphine, Baclofen 20 po bid, gabapentin 600 tid. Delirium haldol prn Cymbalta 20 bid   HEENT: On valtrex for HSV endophthalmitis, Prednisone eye drops qd   CV: HTN - AAA: BP goal <140 labetalol prn   Resp: NC  GI: FLD  : Primafit, monitor I&O. Retained Ureteral stent with recurrent UTI On ceftriaxone for UTI 2/2 enterobacter aerogenes.   PPX: SQH on hold, SCDs  ID: Valtrex for HSV endophthalmitis( 11/20-)  Lines:  RIJ ( 11/12-) 68F with UGIB, 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 Admitted for recurrent Upper GI bleed and UTI, s/p failed embolization x1 and multiple endoscopy attempts. Now s/p laparoscopic total gastrectomy with scar-en-y reconstruction and cholecystectomy (11/20). Clinically improving with advancement of diet. UGI series with no leak.     Neuro: Morphine, Baclofen pump with morphine, Baclofen 20 po bid, gabapentin 600 tid. Delirium haldol prn Cymbalta 20 bid   HEENT: On valtrex for HSV endophthalmitis, Prednisone eye drops qd   CV: HTN - AAA: BP goal <140 labetalol prn   Resp: NC  GI: FLD  : Primafit, monitor I&O. Retained Ureteral stent with recurrent UTI On ceftriaxone for UTI 2/2 enterobacter aerogenes. Send UCx  Heme: monitor H/H and bleeding per mouth  PPX: SQH on hold, SCDs  ID: Valtrex for HSV endophthalmitis( 11/20-)  Lines:  RIJ ( 11/12-)

## 2019-11-30 NOTE — PROGRESS NOTE ADULT - ASSESSMENT
68 F with PMhx of upper GI bleed s/p IR embolization of left gastric artery and splenic artery, with persistent upper GI bleeding requiring subtotal gastrectomy on 11/20, postoperatively no additional bleeding episodes, on 11/30 she passed blood clots per rectum, drop in Hb of 1 unit. Being sent for stat CT angiogram and ordered for 2 units of Prbc. In the SICU for hemodynamic monitoring and EGD 68 F with PMhx of upper GI bleed s/p IR embolization of left gastric artery and splenic artery, with persistent upper GI bleeding requiring subtotal gastrectomy on 11/20, postoperatively no additional bleeding episodes, on 11/30 she passed blood clots per rectum, drop in Hb of 1 unit. Being sent for stat CT angiogram and ordered for 2 units of Prbc. In the SICU for hemodynamic monitoring and EGD.    PLAN:     NEURO: Baclofen, morphine, lidocaine patch, gabapentin, duloxetine  PULM:  Room air  CARDIO:  Monitor hemodynamics, 2 U prbc ordered  GI:  NPO, plan for EGD and Cscope, f/u CT Angio abdomen  RENAL:  Straight cath, follow urine culture  VTE Prophylaxis: Holding in light of active GI bleed   ID: acyclovir for HSV, afebrile, no leukocytosis 68 F with PMhx of upper GI bleed s/p IR embolization of left gastric artery and splenic artery, with persistent upper GI bleeding requiring subtotal gastrectomy on 11/20, postoperatively no additional bleeding episodes, on 11/30 she passed blood clots per rectum, drop in Hb of 1 unit. Being sent for stat CT angiogram and ordered for 2 units of Prbc. In the SICU for hemodynamic monitoring and EGD/colonoscopy for acute blood loss anemia.    PLAN:     NEURO: Baclofen, morphine, lidocaine patch, gabapentin, duloxetine  PULM:  Room air  CARDIO:  Monitor hemodynamics, 2 U prbc ordered  GI:  NPO, plan for EGD and Cscope, f/u CT Angio abdomen  RENAL:  Straight cath, follow urine culture  VTE Prophylaxis: Holding in light of active GI bleed   ID: acyclovir for HSV, afebrile, no leukocytosis

## 2019-11-30 NOTE — PROGRESS NOTE ADULT - SUBJECTIVE AND OBJECTIVE BOX
POD: 10  Procedure: subtotal gastrectomy, cholecystectomy     SUBJECTIVE: Patient seen and examined by chief resident on morning rounds. Resting comfortably in bed. No further episodes of coughing blood, no melena, no dysuria or urinary frequency. Tolerating diet, voiding through primafit. No n/v, CP, SOB, dizziness, lightheadedness.       Vital Signs Last 24 Hrs  T(C): 36.7 (2019 05:00), Max: 36.7 (2019 22:00)  T(F): 98 (2019 05:00), Max: 98 (2019 22:00)  HR: 75 (:00) (64 - 79)  BP: 118/60 (:00) (102/57 - 145/71)  BP(mean): --  RR: 17 (2019 05:00) (17 - 19)  SpO2: 96% (:00) (95% - 98%)    Physical Exam:  General: NAD  Pulmonary: Nonlabored breathing, no respiratory distress  Abdominal: soft, nondistended, nontender with no rebound or guarding, incisions CDI   Extremities: WWP, no clubbing/cyanosis/edema  Neuro: A/O x3    Lines/drains/tubes:    I&O's Summary    2019 07:01  -  2019 07:00  --------------------------------------------------------  IN: 1090 mL / OUT: 0 mL / NET: 1090 mL        LABS:                        7.7    9.14  )-----------( 476      ( 2019 18:36 )             25.1         142  |  108  |  8   ----------------------------<  91  4.3   |  25  |  0.42<L>    Ca    8.3<L>      2019 11:46  Phos  3.0       Mg     1.8             Urinalysis Basic - ( 2019 13:34 )    Color: Yellow / Appearance: Clear / S.010 / pH: x  Gluc: x / Ketone: NEGATIVE  / Bili: Negative / Urobili: 0.2 E.U./dL   Blood: x / Protein: NEGATIVE mg/dL / Nitrite: NEGATIVE   Leuk Esterase: Large / RBC: < 5 /HPF / WBC Many /HPF   Sq Epi: x / Non Sq Epi: 0-5 /HPF / Bacteria: Present /HPF      CAPILLARY BLOOD GLUCOSE      POCT Blood Glucose.: 95 mg/dL (2019 17:00)        RADIOLOGY & ADDITIONAL STUDIES:

## 2019-11-30 NOTE — DISCHARGE NOTE PROVIDER - CARE PROVIDER_API CALL
Liu Woods)  Surgery  [address] [place] [address]  Phone: [phone]  Fax: [phone]  Follow Up Time:

## 2019-11-30 NOTE — PROVIDER CONTACT NOTE (CRITICAL VALUE NOTIFICATION) - ASSESSMENT
pt A&Ox4. pt denies chest pain, SOB, nausea and vomiting. pt had one bloody BM today MD aware. pt surgical incision c/d/i.

## 2019-11-30 NOTE — PROGRESS NOTE ADULT - ASSESSMENT
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 Admitted for recurrent Upper GI bleed and UTI, s/p failed embolization x1 and multiple endoscopy attempts. Now s/p laparoscopic total gastrectomy with scar-en-y reconstruction and cholecystectomy (11/20). Clinically improving with advancement of diet.     Neuro: Morphine, Baclofen pump with morphine, Baclofen 20 po bid, gabapentin 600 tid. Delirium haldol prn Cymbalta 20 bid   HEENT: On valtrex for HSV endophthalmitis, Prednisone eye drops qd   CV: HTN - AAA: BP goal <140 labetalol prn   Resp: NC  GI: FLD  : Primafit, monitor I&O. Retained Ureteral stent with recurrent UTI On ceftriaxone for UTI 2/2 enterobacter aerogenes.   PPX: SQH on hold, SCDs  ID: Valtrex for HSV endophthalmitis( 11/20-)  Lines:  RIJ ( 11/12-) 68F with UGIB, 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 Admitted for recurrent Upper GI bleed and UTI, s/p failed embolization x1 and multiple endoscopy attempts. Now s/p laparoscopic total gastrectomy with scar-en-y reconstruction and cholecystectomy (11/20). Clinically improving with advancement of diet.     Neuro: Morphine, Baclofen pump with morphine, Baclofen 20 po bid, gabapentin 600 tid. Delirium haldol prn Cymbalta 20 bid   HEENT: On valtrex for HSV endophthalmitis, Prednisone eye drops qd   CV: HTN - AAA: BP goal <140 labetalol prn   Resp: NC  GI: FLD  : Primafit, monitor I&O. Retained Ureteral stent with recurrent UTI   PPX: SQH on hold, SCDs  ID: Valtrex for HSV endophthalmitis( 11/20-)

## 2019-11-30 NOTE — CHART NOTE - NSCHARTNOTEFT_GEN_A_CORE
At bedside after being notified that patient had passed blood clots per rectum late this morning. CBC collected and showed a drop to 6.7 from 7.7 last night. Patient's blood pressure 94/46 with HR at 70. Denies dizziness, lightheadedness, shortness of breath, or pain. Abdominal exam unchanged: soft, nondistended, nontender to palpation, no peritoneal signs.      -2 units pRBCs ordered  -Made NPO   -Will be stepped up to telemetry and taken for CT angio  -GI consulted for possible scope.

## 2019-11-30 NOTE — PROGRESS NOTE ADULT - ASSESSMENT
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    1. Normocytic anemia - Exacerbated by GI bleeding. Patient with acute drop in Hgb and blood pressure correlating to new onset maroon stool and one episode of vomiting with scant blood yesterday. Given history and vomiting with blood, some concern for anastomotic bleeding, however, patient with multiple endoscopies and never a colonoscopy with new onset maroon stool as opposed to prior melena.  - Given hypotension would proceed to STAT CTA to search for etiology  - After transfusion and stabilization, would recommend endoscopic and colonoscopic evaluation  - Patient refusing to drink bowel preparation, please perform two tap water enemas prior to procedure    Case pending discussion with attending physician  Addendum to follow with any changes to above plan

## 2019-11-30 NOTE — DISCHARGE NOTE PROVIDER - HOSPITAL COURSE
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 Admitted for recurrent Upper GI bleed and UTI, s/p failed embolization x1 and multiple endoscopy attempts. Now s/p laparoscopic total gastrectomy with scar-en-y reconstruction and cholecystectomy (11/20). Post-operatively, patient was transferred to the SICU intubated. She was extubated on POD 2 and stepped down on POD 3. Her hemoglobin remained stable and an UGI series on POD 5 demonstrated no leak. Her diet was advanced as tolerated and her pain was controlled on her home baclofen pump. On day of d/c, she is tolerating her diet, voiding spontaneously through a primafit, and having nonbloody bowel movements. 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 Admitted for recurrent Upper GI bleed and UTI, s/p failed embolization x1 and multiple endoscopy attempts. Now s/p laparoscopic total gastrectomy with scar-en-y reconstruction and cholecystectomy (11/20). Post-operatively, patient was transferred to the SICU intubated. She was extubated on POD 2 and stepped down on POD 3. Her hemoglobin remained stable and an UGI series on POD 5 demonstrated no leak. Her diet was advanced as tolerated and her pain was controlled on her home baclofen pump. On day of d/c, she is tolerating her diet, voiding spontaneously through a primafit, and having nonbloody bowel movements.             *****incomplete*****

## 2019-11-30 NOTE — DISCHARGE NOTE PROVIDER - NSDCFUADDINST_GEN_ALL_CORE_FT
Follow up with Dr. Woods in 1 week. Call the office at  to schedule your appointment. You may shower; soap and water over incision sites. Do not scrub. Pat dry when done. No tub bathing or swimming until cleared. Keep incision sites out of the sun as scars will darken. No heavy lifting (>10lbs) or strenuous exercise. Diet: Full Fluids. 60 grams protein daily.  64 fluid ounces water daily. Drink small sips throughout the day. You should be urinating at least 3-4x per day. Call the office if you experience increasing abdominal pain, nausea, vomiting, or temperature >100.4F.  NO ASPIRIN OR NSAIDs until approved by Dr. Woods.

## 2019-11-30 NOTE — PROGRESS NOTE ADULT - SUBJECTIVE AND OBJECTIVE BOX
GASTROENTEROLOGY PROGRESS NOTE  GI recalled for episode of hematochezia this morning associated with hypotension and a drop in Hgb. Patient seen and examined at bedside. Patient unable to recount details of the last 24 hours on her own, but  and surgery team at bedside. Patient with one episode of vomiting yesterday which contained blood, in addition to passage of maroon stool or clots today. Patient denies fevers, chills, abdominal pain or distention, shortness of breath, palpitations, any further nausea or vomiting.    PERTINENT REVIEW OF SYSTEMS:  CONSTITUTIONAL: No weakness, fevers or chills  HEENT: No visual changes; No vertigo or throat pain   GASTROINTESTINAL: As above  NEUROLOGICAL: No numbness or weakness  SKIN: No itching, burning, rashes, or lesions     Allergies    No Known Allergies    Intolerances      MEDICATIONS:  MEDICATIONS  (STANDING):  acyclovir   Oral Tab/Cap 1000 milliGRAM(s) Oral every 8 hours  artificial  tears Solution 1 Drop(s) Both EYES two times a day  atorvastatin 40 milliGRAM(s) Oral at bedtime  baclofen 20 milliGRAM(s) Oral two times a day  Baclofen 480 MICROgram(s)/Day,Morphine 2.4 mG/Day,Baclofen 480 MICROGram(s),Morphine 2.4 milliGRAM(s),Baclofen 480 MICROGram(s),Morphine 2.4 milliGRAM(s) 480 MICROGram(s) IntraThecal Continuous Pump  chlorhexidine 0.12% Liquid 15 milliLiter(s) Oral Mucosa every 12 hours  dextrose 50% Injectable 12.5 Gram(s) IV Push once  dextrose 50% Injectable 25 Gram(s) IV Push once  DULoxetine 20 milliGRAM(s) Oral two times a day  gabapentin 600 milliGRAM(s) Oral three times a day  insulin lispro (HumaLOG) corrective regimen sliding scale   SubCutaneous every 6 hours  pantoprazole  Injectable 40 milliGRAM(s) IV Push two times a day  prednisoLONE acetate 1% Suspension 1 Drop(s) Both EYES four times a day  sodium chloride 0.9% lock flush 3 milliLiter(s) IV Push every 8 hours  sodium chloride 2% Ophthalmic Solution 1 Drop(s) Both EYES daily    MEDICATIONS  (PRN):  lidocaine   Patch 2 Patch Transdermal every 24 hours PRN back pain  morphine  - Injectable 2 milliGRAM(s) IV Push every 4 hours PRN Moderate Pain (4 - 6)  morphine  - Injectable 4 milliGRAM(s) IV Push every 4 hours PRN Severe Pain (7 - 10)    Vital Signs Last 24 Hrs  T(C): 36.7 (30 Nov 2019 13:03), Max: 36.7 (29 Nov 2019 22:00)  T(F): 98 (30 Nov 2019 13:03), Max: 98 (29 Nov 2019 22:00)  HR: 64 (30 Nov 2019 13:03) (64 - 79)  BP: 110/54 (30 Nov 2019 13:03) (92/50 - 145/71)  BP(mean): --  RR: 17 (30 Nov 2019 13:03) (17 - 18)  SpO2: 98% (30 Nov 2019 13:03) (95% - 98%)    11-29 @ 07:01 - 11-30 @ 07:00  --------------------------------------------------------  IN: 1090 mL / OUT: 0 mL / NET: 1090 mL    11-30 @ 07:01 - 11-30 @ 14:04  --------------------------------------------------------  IN: 400 mL / OUT: 100 mL / NET: 300 mL      PHYSICAL EXAM:    General: Elderly, frail appearing  HEENT: MMM, conjunctiva and sclera clear  Gastrointestinal: Soft non-tender non-distended; Normal bowel sounds; No hepatosplenomegaly. No rebound or guarding  Rectal: Maroon stool  Skin: Warm and dry. No obvious rash    LABS:                        6.7    7.93  )-----------( 420      ( 30 Nov 2019 10:54 )             21.9     11-30    141  |  109<H>  |  15  ----------------------------<  91  4.4   |  23  |  0.44<L>    Ca    7.9<L>      30 Nov 2019 10:54  Phos  2.8     11-30  Mg     1.8     11-30      Culture - Urine (collected 29 Nov 2019 20:43)  Source: .Urine None  Preliminary Report (30 Nov 2019 12:17):    >100,000 CFU/ml Gram Negative Rods    Identification and susceptibility to follow.      RADIOLOGY & ADDITIONAL STUDIES:  Reviewed

## 2019-11-30 NOTE — PROGRESS NOTE ADULT - SUBJECTIVE AND OBJECTIVE BOX
Pt is 68F with h/o aortic dissection - multiple repairs (20 years ago), GIB - possible Dieulafoy lesion of stomach/duodenum - recent splenic and left gastric arterial embolizations ( at [facility]), spinal hematoma - paraplegia - baclofen pump in place RLQ Abd, nephrolithiasis w/ retained ureteral stent (due for stent exchange w/ urology), recurrent UTIs - known Hx ESBL organisms - requires self straight cath, HTN, PAD, and HSV endophthalmitis/keratitis, left corneal transplant. initially presenting w/ AMS, hypotension, now admitted to [facility] for recurrent upper GI bleed and UTI. Pt found to have hypovolemic/septic shock 2/2 to upper GI bleed and UTI. Since patient has had recent prior splenic artery coil embolization and left gastric artery particle embolization, lack of additional arteries to embolize, she underwent laparoscopic  subtotal gastrectomy with Deandra-en-Y reconstruction and cholecystectomy. While on the surgical floor she passed blood clots per rectum. Hb 6.7 from 7.7 last night. Patient's blood pressure 94/46 with HR at 70. Decision was made for a stat CT angiogram, transfuse prbc's and prepare the patient for an upper endoscopy. Transferred to the SICU for hemodynamic monitoring.    General: No acute distress  Neurology: Awake  Eyes: Scleras clear  Respiratory: Normal work of breathing  CV: RRR  Abdominal: Soft, NT  Extremities: No edema, + peripheral pulses  Psych: Oriented      LABS/RADIOLOGY RESULTS:                          6.7    7.93  )-----------( 420      ( 2019 10:54 )             21.9   11-    141  |  109<H>  |  15  ----------------------------<  91  4.4   |  23  |  0.44<L>    Ca    7.9<L>      2019 10:54  Phos  2.8       Mg     1.8     -    Blood Cultures    Blood Culture--    @ 20:43    Results  >100,000 CFU/ml Gram Negative Rods  Identification and susceptibility to follow.    Organism--    Organism ID--    Urine Culture    @ 20:43    --       Results  >100,000 CFU/ml Gram Negative Rods  Identification and susceptibility to follow.    Organism--    Organism ID--  Urinalysis Basic - ( 2019 13:34 )    Color: Yellow / Appearance: Clear / S.010 / pH:   Gluc:  / Ketone: NEGATIVE  / Bili: Negative / Urobili: 0.2 E.U./dL   Blood:  / Protein: NEGATIVE mg/dL / Nitrite: NEGATIVE   Leuk Esterase: Large / RBC: < 5 /HPF / WBC Many /HPF   Sq Epi:  / Non Sq Epi: 0-5 /HPF / Bacteria: Present /HPF          ICU Vital Signs Last 24 Hrs  T(C): 36.7 (2019 13:03), Max: 36.7 (2019 22:00)  T(F): 98 (2019 13:03), Max: 98 (2019 22:00)  HR: 64 (2019 13:03) (64 - 79)  BP: 110/54 (2019 13:03) (92/50 - 145/71)  BP(mean): --  ABP: --  ABP(mean): --  RR: 17 (2019 13:03) (17 - 18)  SpO2: 98% (2019 13:03) (95% - 98%) Pt is 68F with h/o aortic dissection - multiple repairs (20 years ago), GIB - possible Dieulafoy lesion of stomach/duodenum - recent splenic and left gastric arterial embolizations ( at [facility]), spinal hematoma - paraplegia - baclofen pump in place RLQ Abd, nephrolithiasis w/ retained ureteral stent (due for stent exchange w/ urology), recurrent UTIs - known Hx ESBL organisms - requires self straight cath, HTN, PAD, and HSV endophthalmitis/keratitis, left corneal transplant. initially presenting w/ AMS, hypotension, now admitted to [facility] for recurrent upper GI bleed and UTI. Pt found to have hypovolemic/septic shock 2/2 to upper GI bleed and UTI. Since patient has had recent prior splenic artery coil embolization and left gastric artery particle embolization, lack of additional arteries to embolize, she underwent laparoscopic  subtotal gastrectomy with Deandra-en-Y reconstruction and cholecystectomy. While on the surgical floor she passed blood clots per rectum. Hb 6.7 from 7.7 last night. Patient's blood pressure 94/46 with HR at 70. Decision was made for a stat CT angiogram, transfuse prbc's and prepare the patient for an upper endoscopy. Transferred to the SICU for hemodynamic monitoring.    General: No acute distress  Neurology: Awake  Eyes: Scleras clear  Respiratory: Normal work of breathing, lungs clear to auscultation  CV: RRR  Abdominal: Soft, distended, pump on anterior abdominal wall, laparoscopy incisions CDI  Extremities: No edema, + peripheral pulses  Psych: Oriented      LABS/RADIOLOGY RESULTS:                          6.7    7.93  )-----------( 420      ( 2019 10:54 )             21.9       141  |  109<H>  |  15  ----------------------------<  91  4.4   |  23  |  0.44<L>    Ca    7.9<L>      2019 10:54  Phos  2.8       Mg     1.8         Blood Cultures    Blood Culture--    @ 20:43    Results  >100,000 CFU/ml Gram Negative Rods  Identification and susceptibility to follow.    Organism--    Organism ID--    Urine Culture    @ 20:43    --       Results  >100,000 CFU/ml Gram Negative Rods  Identification and susceptibility to follow.    Organism--    Organism ID--  Urinalysis Basic - ( 2019 13:34 )    Color: Yellow / Appearance: Clear / S.010 / pH:   Gluc:  / Ketone: NEGATIVE  / Bili: Negative / Urobili: 0.2 E.U./dL   Blood:  / Protein: NEGATIVE mg/dL / Nitrite: NEGATIVE   Leuk Esterase: Large / RBC: < 5 /HPF / WBC Many /HPF   Sq Epi:  / Non Sq Epi: 0-5 /HPF / Bacteria: Present /HPF          ICU Vital Signs Last 24 Hrs  T(C): 36.7 (2019 13:03), Max: 36.7 (2019 22:00)  T(F): 98 (2019 13:03), Max: 98 (2019 22:00)  HR: 64 (2019 13:03) (64 - 79)  BP: 110/54 (2019 13:03) (92/50 - 145/71)  BP(mean): --  ABP: --  ABP(mean): --  RR: 17 (2019 13:03) (17 - 18)  SpO2: 98% (2019 13:03) (95% - 98%) Pt is 68F with h/o aortic dissection - multiple repairs (20 years ago), GIB - possible Dieulafoy lesion of stomach/duodenum - recent splenic and left gastric arterial embolizations ( at [facility]), spinal hematoma - paraplegia - baclofen pump in place RLQ Abd, nephrolithiasis w/ retained ureteral stent (due for stent exchange w/ urology), recurrent UTIs - known Hx ESBL organisms - requires self straight cath, HTN, PAD, and HSV endophthalmitis/keratitis, left corneal transplant. initially presenting w/ AMS, hypotension, now admitted to [facility] for recurrent upper GI bleed and UTI. Pt found to have hypovolemic/septic shock 2/2 to upper GI bleed and UTI. Since patient has had recent prior splenic artery coil embolization and left gastric artery particle embolization, lack of additional arteries to embolize, she underwent laparoscopic  subtotal gastrectomy with Deandra-en-Y reconstruction and cholecystectomy. While on the surgical floor she passed blood clots per rectum. Hb 6.7 from 7.7 last night. Patient's blood pressure 94/46 with HR at 70. Decision was made for a stat CT angiogram, transfuse prbc's and prepare the patient for an upper endoscopy. Transferred to the SICU for hemodynamic monitoring.    General: No acute distress  Neurology: Awake amd moves all extremities  HEENT: Scleras clear MMM chronic left ptosis  Respiratory: Normal work of breathing, lungs clear to auscultation  CV: RRR  Abdominal: Soft, distended, pump on anterior abdominal wall, laparoscopy incisions CDI  Extremities: No edema,   Vasc: + peripheral pulses  Psych: Oriented and calm  Skin; no rash      LABS/RADIOLOGY RESULTS:                          6.7    7.93  )-----------( 420      ( 2019 10:54 )             21.9       141  |  109<H>  |  15  ----------------------------<  91  4.4   |  23  |  0.44<L>    Ca    7.9<L>      2019 10:54  Phos  2.8       Mg     1.8         Blood Cultures    Blood Culture--    @ 20:43    Results  >100,000 CFU/ml Gram Negative Rods  Identification and susceptibility to follow.    Organism--    Organism ID--    Urine Culture    @ 20:43    --       Results  >100,000 CFU/ml Gram Negative Rods  Identification and susceptibility to follow.    Organism--    Organism ID--  Urinalysis Basic - ( 2019 13:34 )    Color: Yellow / Appearance: Clear / S.010 / pH:   Gluc:  / Ketone: NEGATIVE  / Bili: Negative / Urobili: 0.2 E.U./dL   Blood:  / Protein: NEGATIVE mg/dL / Nitrite: NEGATIVE   Leuk Esterase: Large / RBC: < 5 /HPF / WBC Many /HPF   Sq Epi:  / Non Sq Epi: 0-5 /HPF / Bacteria: Present /HPF          ICU Vital Signs Last 24 Hrs  T(C): 36.7 (2019 13:03), Max: 36.7 (2019 22:00)  T(F): 98 (2019 13:03), Max: 98 (2019 22:00)  HR: 64 (2019 13:03) (64 - 79)  BP: 110/54 (2019 13:03) (92/50 - 145/71)  BP(mean): --  ABP: --  ABP(mean): --  RR: 17 (2019 13:03) (17 - 18)  SpO2: 98% (2019 13:03) (95% - 98%)

## 2019-11-30 NOTE — DISCHARGE NOTE PROVIDER - NSDCMRMEDTOKEN_GEN_ALL_CORE_FT
amLODIPine 5 mg oral tablet: 1 tab(s) orally once a day  atorvastatin 40 mg oral tablet: 1 tab(s) orally once a day (at bedtime)  baclofen 20 mg oral tablet: 1 tab(s) orally 2 times a day   Bactrim 400 mg-80 mg oral tablet: 1 tab(s) orally every 12 hours   diclofenac 1% topical gel: Apply topically to affected area 4 times a day, As Needed -for mild pain MDD:please supply 1 month supply   DULoxetine 20 mg oral delayed release capsule: 1 cap(s) orally 2 times a day  gabapentin 600 mg oral tablet: 1 tab(s) orally 3 times a day  labetalol 200 mg oral tablet: 1 tab(s) orally 2 times a day  Morphine/Baclofen Syringe: Morphine 10 mg/mL, Baclofen 2000 mcg/mL, Dispense as 20 mL syringe: Upon delivery, transfer syringe contents to patient&#x27;s pump.  Administer medication via pump INTRATHECALLY.    Please deliver by 11/27/19.  oxyCODONE 10 mg oral tablet: 1 tab(s) orally 3 times a day, As needed, Moderate Pain (4 - 6) MDD:1 tab every 8 hours for severe pain  prednisoLONE acetate 1% ophthalmic suspension: 1 drop(s) to each affected eye 4 times a day MDD:1 month supply  Protonix 20 mg oral delayed release tablet: 1 tab(s) orally once a day  sodium chloride, hypertonic 5% ophthalmic solution: 1 application to each affected eye once a day MDD:please give 1 month supply  valACYclovir 1 g oral tablet: 1 tab(s) orally 3 times a day  zolpidem 5 mg oral tablet: 1 tab(s) orally once a day (at bedtime), As needed, Insomnia

## 2019-12-01 NOTE — PROGRESS NOTE ADULT - SUBJECTIVE AND OBJECTIVE BOX
GASTROENTEROLOGY PROGRESS NOTE  Patient seen and examined at bedside. Patient feeling well this morning without any further bleeding or episodes overnight.    PERTINENT REVIEW OF SYSTEMS:  CONSTITUTIONAL: No weakness, fevers or chills  HEENT: No visual changes; No vertigo or throat pain   GASTROINTESTINAL: No abdominal or epigastric pain. No nausea, vomiting, or hematemesis; No diarrhea or constipation. No melena or hematochezia.  NEUROLOGICAL: No numbness or weakness  SKIN: No itching, burning, rashes, or lesions     Allergies    No Known Allergies    Intolerances      MEDICATIONS:  MEDICATIONS  (STANDING):  acyclovir   Oral Tab/Cap 1000 milliGRAM(s) Oral every 8 hours  artificial  tears Solution 1 Drop(s) Both EYES two times a day  atorvastatin 40 milliGRAM(s) Oral at bedtime  baclofen 20 milliGRAM(s) Oral two times a day  Baclofen 480 MICROgram(s)/Day,Morphine 2.4 mG/Day,Baclofen 480 MICROGram(s),Morphine 2.4 milliGRAM(s),Baclofen 480 MICROGram(s),Morphine 2.4 milliGRAM(s) 480 MICROGram(s) IntraThecal Continuous Pump  dextrose 5% + sodium chloride 0.9%. 1000 milliLiter(s) (80 mL/Hr) IV Continuous <Continuous>  dextrose 50% Injectable 12.5 Gram(s) IV Push once  dextrose 50% Injectable 25 Gram(s) IV Push once  DULoxetine 20 milliGRAM(s) Oral two times a day  gabapentin 600 milliGRAM(s) Oral three times a day  insulin lispro (HumaLOG) corrective regimen sliding scale   SubCutaneous every 6 hours  magnesium citrate Oral Solution 1 Bottle Oral once  pantoprazole  Injectable 40 milliGRAM(s) IV Push two times a day  polyethylene glycol/electrolyte Solution. 4000 milliLiter(s) Oral once  prednisoLONE acetate 1% Suspension 1 Drop(s) Both EYES four times a day  sodium chloride 0.9% lock flush 3 milliLiter(s) IV Push every 8 hours  sodium chloride 2% Ophthalmic Solution 1 Drop(s) Both EYES daily    MEDICATIONS  (PRN):  lidocaine   Patch 2 Patch Transdermal every 24 hours PRN back pain  morphine  - Injectable 2 milliGRAM(s) IV Push every 4 hours PRN Moderate Pain (4 - 6)  morphine  - Injectable 4 milliGRAM(s) IV Push every 4 hours PRN Severe Pain (7 - 10)    Vital Signs Last 24 Hrs  T(C): 36.4 (01 Dec 2019 09:41), Max: 36.7 (30 Nov 2019 13:03)  T(F): 97.5 (01 Dec 2019 09:41), Max: 98 (30 Nov 2019 13:03)  HR: 82 (01 Dec 2019 10:00) (54 - 103)  BP: 141/63 (01 Dec 2019 10:00) (101/50 - 162/60)  BP(mean): 86 (01 Dec 2019 10:00) (65 - 118)  RR: 12 (01 Dec 2019 10:00) (9 - 34)  SpO2: 97% (01 Dec 2019 10:00) (96% - 100%)    11-30 @ 07:01  -  12-01 @ 07:00  --------------------------------------------------------  IN: 1900 mL / OUT: 1400 mL / NET: 500 mL    12-01 @ 07:01  -  12-01 @ 10:29  --------------------------------------------------------  IN: 320 mL / OUT: 0 mL / NET: 320 mL      PHYSICAL EXAM:    General: Elderly, NAD  HEENT: MMM, conjunctiva and sclera clear  Gastrointestinal: Soft non-tender non-distended; Normal bowel sounds; No hepatosplenomegaly. No rebound or guarding  Skin: Warm and dry. No obvious rash    LABS:                        12.9   15.08 )-----------( 476      ( 01 Dec 2019 09:17 )             42.4     12-01    138  |  105  |  9   ----------------------------<  80  See Note   |  22  |  0.37<L>    Ca    8.6      01 Dec 2019 09:17  Phos  3.3     12-01  Mg     1.9     12-01    TPro  6.0  /  Alb  3.2<L>  /  TBili  0.4  /  DBili  x   /  AST  See Note  /  ALT  See Note  /  AlkPhos  161<H>  12-01                      Culture - Urine (collected 29 Nov 2019 20:43)  Source: .Urine None  Preliminary Report (01 Dec 2019 08:51):    >100,000 CFU/ml Morganella morganii    >100,000 CFU/ml Citrobacter freundii complex    Additional Susceptibility to follow.  Organism: Morganella morganii  Morganella morganii  Citrobacter freundii complex (01 Dec 2019 08:50)  Organism: Citrobacter freundii complex (01 Dec 2019 08:50)  Organism: Morganella morganii (01 Dec 2019 08:46)  Organism: Morganella morganii (01 Dec 2019 08:45)      RADIOLOGY & ADDITIONAL STUDIES:  Reviewed

## 2019-12-01 NOTE — PROGRESS NOTE ADULT - ASSESSMENT
68 F with PMhx of upper GI bleed s/p IR embolization of left gastric artery and splenic artery, with persistent upper GI bleeding requiring subtotal gastrectomy on 11/20, postoperatively no additional bleeding episodes, on 11/30 she passed blood clots per rectum, drop in Hb of 1 unit. Being sent for stat CT angiogram and ordered for 2 units of Prbc. Pending colonoscopy tomorrow with GI    PLAN:     NEURO: Baclofen, morphine, lidocaine patch, gabapentin, duloxetine  PULM:  Room air  CARDIO:  Monitor hemodynamics, 2 U prbc transfused.   GI:  NPO, plan for colonoscopy tomorrow. Magnesium citrate today, golytely tonight  RENAL:  Straight cath, follow urine culture  VTE Prophylaxis: Holding in light of active GI bleed   ID: acyclovir for HSV, afebrile, no leukocytosis

## 2019-12-01 NOTE — PROGRESS NOTE ADULT - ASSESSMENT
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    1. Normocytic anemia - Exacerbated by GI bleeding. Patient with acute drop in Hgb and blood pressure correlating to new onset maroon stool and one episode of vomiting with scant blood yesterday. Given history and vomiting with blood, some concern for anastomotic bleeding, however, patient with multiple endoscopies and never a colonoscopy with new onset maroon stool as opposed to prior melena. EGD and Colonoscopy on 11/30 at bedside in SICU with pigmented spot on suture line, colon with melenic appearing stool until the ascending colon with abrupt transition to brown stool, concern for bleeding lesion somewhere in colon.  - Maintain active T&S, large bore IV access  - Transfusion threshold per primary team  - Clear liquid diet today  - Please order split-dose prep with 2L GoLytely given at 17:00 today and an additional 2L at 05:00 tomorrow  - NPO at MN except medication for colonoscopy in AM    Case pending discussion with attending physician  Addendum to follow with any changes to above plan

## 2019-12-01 NOTE — PROGRESS NOTE ADULT - SUBJECTIVE AND OBJECTIVE BOX
Pt seen and examined at bedside, event overnight noted, haemodynamically stable. Denies fever, chills, nausea, vomiting or diarrhoea.     MEDICATIONS  (STANDING):  acyclovir   Oral Tab/Cap 1000 milliGRAM(s) Oral every 8 hours  artificial  tears Solution 1 Drop(s) Both EYES two times a day  atorvastatin 40 milliGRAM(s) Oral at bedtime  baclofen 20 milliGRAM(s) Oral two times a day  Baclofen 480 MICROgram(s)/Day,Morphine 2.4 mG/Day,Baclofen 480 MICROGram(s),Morphine 2.4 milliGRAM(s),Baclofen 480 MICROGram(s),Morphine 2.4 milliGRAM(s) 480 MICROGram(s) IntraThecal Continuous Pump  dextrose 5% + sodium chloride 0.9%. 1000 milliLiter(s) (80 mL/Hr) IV Continuous <Continuous>  dextrose 50% Injectable 12.5 Gram(s) IV Push once  dextrose 50% Injectable 25 Gram(s) IV Push once  DULoxetine 20 milliGRAM(s) Oral two times a day  gabapentin 600 milliGRAM(s) Oral three times a day  insulin lispro (HumaLOG) corrective regimen sliding scale   SubCutaneous every 6 hours  magnesium citrate Oral Solution 1 Bottle Oral once  pantoprazole  Injectable 40 milliGRAM(s) IV Push two times a day  polyethylene glycol/electrolyte Solution. 4000 milliLiter(s) Oral once  prednisoLONE acetate 1% Suspension 1 Drop(s) Both EYES four times a day  sodium chloride 0.9% lock flush 3 milliLiter(s) IV Push every 8 hours  sodium chloride 2% Ophthalmic Solution 1 Drop(s) Both EYES daily    MEDICATIONS  (PRN):  lidocaine   Patch 2 Patch Transdermal every 24 hours PRN back pain  morphine  - Injectable 2 milliGRAM(s) IV Push every 4 hours PRN Moderate Pain (4 - 6)  morphine  - Injectable 4 milliGRAM(s) IV Push every 4 hours PRN Severe Pain (7 - 10)    ICU Vital Signs Last 24 Hrs  T(C): 36.4 (01 Dec 2019 05:17), Max: 36.7 (30 Nov 2019 13:03)  T(F): 97.6 (01 Dec 2019 05:17), Max: 98 (30 Nov 2019 13:03)  HR: 72 (01 Dec 2019 08:00) (54 - 103)  BP: 124/60 (01 Dec 2019 08:00) (94/46 - 162/60)  BP(mean): 78 (01 Dec 2019 08:00) (65 - 118)  ABP: --  ABP(mean): --  RR: 10 (01 Dec 2019 08:00) (9 - 34)  SpO2: 96% (01 Dec 2019 08:00) (96% - 100%)      General: No acute distress  Neurology: Awake amd moves all extremities  HEENT: Scleras clear MMM chronic left ptosis  Respiratory: Normal work of breathing, lungs clear to auscultation  CV: RRR  Abdominal: Soft, distended, pump on anterior abdominal wall, laparoscopy incisions CDI  Extremities: No edema,   Vasc: + peripheral pulses  Psych: Oriented and calm  Skin; no rash                          10.1   8.09  )-----------( 440      ( 01 Dec 2019 03:13 )             33.2   11-30    141  |  109<H>  |  15  ----------------------------<  91  4.4   |  23  |  0.44<L>    Ca    7.9<L>      30 Nov 2019 10:54  Phos  2.8     11-30  Mg     1.8     11-30

## 2019-12-02 NOTE — PROCEDURE NOTE - NSINFORMCONSENT_GEN_A_CORE
This was an emergent procedure.
This was an emergent procedure.
GIB with acute hypotension and inadequate PIV access/This was an emergent procedure.
This was an emergent procedure.
This was an emergent procedure./Attending at bedside, corey hemorrhagic shock with active bleeding

## 2019-12-02 NOTE — CHART NOTE - NSCHARTNOTEFT_GEN_A_CORE
Admitting Diagnosis:   Patient is a 68y old  Female who presents with a chief complaint of Recurrent UGIB (23 Nov 2019 10:28)    PAST MEDICAL & SURGICAL HISTORY:  Melena: 10/7/2019  Gastrointestinal hemorrhage: 9/28/2019, 9/4/2019, 8/29/2019  Dieulafoy lesion of stomach or duodenum: 10/1/2019  Subdural hematoma, nontraumatic: spontaneous  Dorsalgia of lumbar region: on pain medication /baclofen po and pump  Self-catheterizes urinary bladder  Anemia: chronic  Uveitis  Osteoporosis  PAD (peripheral artery disease)  Hematoma: spinal treated September 2018  Paraplegia: due to spinal hematoma, on wheelchair goes to physical therapy 2 x weekly  Aortic dissection, thoracic: Type A Repaired 2009  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 2018  UTI (urinary tract infection): recurrent  TIA (transient ischemic attack)  HTN (Hypertension)  History of corneal transplant: left corneal transplant on 5/21/2018  Disorder of spine: unthetethering 2 x  Presence of IVC filter: 2014 ?  S/P aortic dissection repair: Type A Dissection repair /2009   descending aortic aneurysm repair 9/2016  H/O Spinal surgery: laminectomies 2014    Current Nutrition Order:   Diet, NPO after Midnight:      NPO Start Date: 01-Dec-2019,   NPO Start Time: 23:59 (12-01-19 @ 08:44)    PO Intake: Good (%) [   ]  Fair (50-75%) [   ] Poor (<25%) [   ]    GI Issues: abdomen distended, +rectal tube (no documentation)    Pain:    Skin Integrity: Randal score 16, erythema perineum, stage II healed wound on coccyx    Labs:   12-02    141  |  105  |  5<L>  ----------------------------<  97  2.9<LL>   |  29  |  0.38<L>    Ca    8.0<L>      02 Dec 2019 04:52  Phos  2.9     12-02  Mg     2.0     12-02    TPro  6.0  /  Alb  3.2<L>  /  TBili  0.4  /  DBili  x   /  AST  See Note  /  ALT  See Note  /  AlkPhos  161<H>  12-01    CAPILLARY BLOOD GLUCOSE    POCT Blood Glucose.: 84 mg/dL (02 Dec 2019 05:59)  POCT Blood Glucose.: 102 mg/dL (01 Dec 2019 22:56)  POCT Blood Glucose.: 82 mg/dL (01 Dec 2019 16:00)  POCT Blood Glucose.: 86 mg/dL (01 Dec 2019 11:42)    Medications:  MEDICATIONS  (STANDING):  acyclovir   Oral Tab/Cap 1000 milliGRAM(s) Oral every 8 hours  artificial  tears Solution 1 Drop(s) Both EYES two times a day  atorvastatin 40 milliGRAM(s) Oral at bedtime  baclofen 20 milliGRAM(s) Oral two times a day  Baclofen 480 MICROgram(s)/Day,Morphine 2.4 mG/Day,Baclofen 480 MICROGram(s),Morphine 2.4 milliGRAM(s),Baclofen 480 MICROGram(s),Morphine 2.4 milliGRAM(s) 480 MICROGram(s) IntraThecal Continuous Pump  chlorhexidine 4% Liquid 1 Application(s) Topical <User Schedule>  dextrose 5% + lactated ringers 1000 milliLiter(s) (80 mL/Hr) IV Continuous <Continuous>  dextrose 50% Injectable 12.5 Gram(s) IV Push once  dextrose 50% Injectable 25 Gram(s) IV Push once  DULoxetine 20 milliGRAM(s) Oral two times a day  gabapentin 600 milliGRAM(s) Oral three times a day  insulin lispro (HumaLOG) corrective regimen sliding scale   SubCutaneous every 6 hours  pantoprazole  Injectable 40 milliGRAM(s) IV Push two times a day  potassium chloride  10 mEq/100 mL IVPB 10 milliEquivalent(s) IV Intermittent every 1 hour  potassium chloride  10 mEq/100 mL IVPB 10 milliEquivalent(s) IV Intermittent every 1 hour  potassium phosphate IVPB 30 milliMole(s) IV Intermittent once  prednisoLONE acetate 1% Suspension 1 Drop(s) Both EYES four times a day  sodium chloride 0.9% lock flush 3 milliLiter(s) IV Push every 8 hours  sodium chloride 2% Ophthalmic Solution 1 Drop(s) Both EYES daily    MEDICATIONS  (PRN):  lidocaine   Patch 2 Patch Transdermal every 24 hours PRN back pain  morphine  - Injectable 2 milliGRAM(s) IV Push every 4 hours PRN Moderate Pain (4 - 6)  morphine  - Injectable 4 milliGRAM(s) IV Push every 4 hours PRN Severe Pain (7 - 10)    Weight:  (11/12) 106lbs (48 kg)   (11/15) 134.2lbs (60.9kg) bed   (11/21) 141.4lbs (64.3kg) bed  *unable to obtain updated weight 2/2 bedscale error    Weight Change:   Nutrition Focused Physical Exam: Completed [ X-11/13/19  ]  Not Pertinent [   ]  From 11/13/19: If admitted weight is accurate, this would indicate a 16lb unintentional wt loss (13% wt change) x2 weeks.   Pt noted to have moderate muscle wasting in clavicular region. Moderate protein calorie malnutrition suspected.     Estimated energy needs:   Height: 5'7" Weight: 105lbs, IBW 135lbs+/-10%, %IBW 77%, BMI 16.4   ABW used for calculations as pt is <80% IBW. Needs adjusted for suspected malnutrition, +pressure injury, underweight, post-op healing  (25-30 kcal/kg): 0908-1781 kcal/day  (1.4-1.6 g/kg): 66-76 g protein/day  (30-35 ml/kg): 8431-2656 ml/day    Subjective:   Pt is a 68 y.o F h/o aortic dissection s/p multiple repairs, spinal hematoma resulting in paraplegia, and embolization of splenic and L gastric arteries for GI bleeding, now admitted for GI bleed. Pt was admitted last week where she required splenic and L gastric arterial embolization (11/7). After d/c pt presented at [place] [facility] for AMS and was transferred to [facility] after being found w/ hypotension and anemia. Pt w/ acute drop in hgb and HD instability following melenic stool. S/p bedside EGD 11/12 showing large amounts of blood in the stomach but no active bleeding. Pt also w/ notable large amounts of stool and rectal distension on CT. Pt was initially intubated for airway protection, now extubated. Pt was transferred from CT surgery to MICU for further management.  Was planned for EUS which was deferred 11/15-11/18. On 11/18, pt became tachy, hypotensive, and noted to have melanotic stool. Hgb dropping again, was given 2U pRBC and placed on levo gtt. Bedside EGD showing significant amounts of clotted blood in stomach w/ no active bleed. Consensus between GI, IR, vascular and gen surgery was for total gastrectomy. Pt now POD6 s/p total gastrectomy w/ Deandra en Y reconstruction and cholecystectomy. Admitted to SICU team for post-op management. Was extubated 11/22 and weaned off sedatives 11/23. TPN was initiated 11/23 for nutrition support d/t overall insufficient intake this admission.  Pt had been primarily NPO since admission except when on a mechanical soft diet from 11/15-18. This indicates at least 13 days of NPO status. Pt is suspected to have moderate protein calorie malnutrition 2/2 13% wt change x2 weeks and visible muscle wasting in temporal and clavicular region. Noted, pt is also at high risk for further unintentional wt loss s/p total gastrectomy. Pt stepped down to 8LA on 11/23. UGI completed 11/25- negative for leak. Pt was advanced to BARICLLIQ 11/24 and then to Phase 1 Bariatric Full Liquids this am 11/27. Pt seen in room, resting in bed. Consumed tea and broth this am w/ good tolerance. Denies N/V, or abdominal discomfort.  Discussed purpose of slow diet advancement- RD provided more indepth edu on nutrition s/p total gastrectomy to avoid further wt loss/dumping. Pt reports difficulty weighing herself as she cannot stand on a scale- discussed other strategies for visualizing weight changes. Recommend advancing to Phase 2 Pureed/soft once medically feasible. D/w MD planned diet for d/c- team unclear at this point. RD to follow.     Previous Nutrition Diagnosis:  Moderate protein-calorie malnutrition RT unclear etiology AEB 13% wt loss x 2 weeks, NFPE findings include moderate muscle wasting.     Active [ X  ]  Resolved [   ]    Goal: Pt to consistently meet % of estimated needs PO     Recommendations:  1) Recommend advancing to Phase 2 pureed/soft once medically feasible. This will provide the patient w/ Greek yogurt, Prostats and Ensure High Protein TID (480kcal, 48g pro).   2) Monitor POCT BG Q6H   3) Obtain and monitor wt trends.   4) Recommend chewable MVI, B12, OsCal. Iron supplementation per MD.   *d/w team.     Education:   purpose of gradual advancement. Provided edu on nutrition therapy s/p total gastrectomy. RD to f/u w/ 1 week full liquid meal plan.     Risk Level: High [ X ] Moderate [   ] Low [   ] Admitting Diagnosis:   Patient is a 68y old  Female who presents with a chief complaint of Recurrent UGIB (23 Nov 2019 10:28)    PAST MEDICAL & SURGICAL HISTORY:  Melena: 10/7/2019  Gastrointestinal hemorrhage: 9/28/2019, 9/4/2019, 8/29/2019  Dieulafoy lesion of stomach or duodenum: 10/1/2019  Subdural hematoma, nontraumatic: spontaneous  Dorsalgia of lumbar region: on pain medication /baclofen po and pump  Self-catheterizes urinary bladder  Anemia: chronic  Uveitis  Osteoporosis  PAD (peripheral artery disease)  Hematoma: spinal treated September 2018  Paraplegia: due to spinal hematoma, on wheelchair goes to physical therapy 2 x weekly  Aortic dissection, thoracic: Type A Repaired 2009  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 2018  UTI (urinary tract infection): recurrent  TIA (transient ischemic attack)  HTN (Hypertension)  History of corneal transplant: left corneal transplant on 5/21/2018  Disorder of spine: unthetethering 2 x  Presence of IVC filter: 2014 ?  S/P aortic dissection repair: Type A Dissection repair /2009   descending aortic aneurysm repair 9/2016  H/O Spinal surgery: laminectomies 2014    Current Nutrition Order:   Diet, NPO after Midnight:      NPO Start Date: 01-Dec-2019,   NPO Start Time: 23:59 (12-01-19 @ 08:44)    PO Intake: Good (%) [   ]  Fair (50-75%) [   ] Poor (<25%) [   ]    GI Issues: abdomen distended, +rectal tube (no documentation)    Pain: dull, constant abdominal pain    Skin Integrity: Randal score 16, erythema perineum, stage II healed wound on coccyx    Labs:   12-02    141  |  105  |  5<L>  ----------------------------<  97  2.9<LL>   |  29  |  0.38<L>    Ca    8.0<L>      02 Dec 2019 04:52  Phos  2.9     12-02  Mg     2.0     12-02    TPro  6.0  /  Alb  3.2<L>  /  TBili  0.4  /  DBili  x   /  AST  See Note  /  ALT  See Note  /  AlkPhos  161<H>  12-01    CAPILLARY BLOOD GLUCOSE    POCT Blood Glucose.: 84 mg/dL (02 Dec 2019 05:59)  POCT Blood Glucose.: 102 mg/dL (01 Dec 2019 22:56)  POCT Blood Glucose.: 82 mg/dL (01 Dec 2019 16:00)  POCT Blood Glucose.: 86 mg/dL (01 Dec 2019 11:42)    Medications:  MEDICATIONS  (STANDING):  acyclovir   Oral Tab/Cap 1000 milliGRAM(s) Oral every 8 hours  artificial  tears Solution 1 Drop(s) Both EYES two times a day  atorvastatin 40 milliGRAM(s) Oral at bedtime  baclofen 20 milliGRAM(s) Oral two times a day  Baclofen 480 MICROgram(s)/Day,Morphine 2.4 mG/Day,Baclofen 480 MICROGram(s),Morphine 2.4 milliGRAM(s),Baclofen 480 MICROGram(s),Morphine 2.4 milliGRAM(s) 480 MICROGram(s) IntraThecal Continuous Pump  chlorhexidine 4% Liquid 1 Application(s) Topical <User Schedule>  dextrose 5% + lactated ringers 1000 milliLiter(s) (80 mL/Hr) IV Continuous <Continuous>  dextrose 50% Injectable 12.5 Gram(s) IV Push once  dextrose 50% Injectable 25 Gram(s) IV Push once  DULoxetine 20 milliGRAM(s) Oral two times a day  gabapentin 600 milliGRAM(s) Oral three times a day  insulin lispro (HumaLOG) corrective regimen sliding scale   SubCutaneous every 6 hours  pantoprazole  Injectable 40 milliGRAM(s) IV Push two times a day  potassium chloride  10 mEq/100 mL IVPB 10 milliEquivalent(s) IV Intermittent every 1 hour  potassium chloride  10 mEq/100 mL IVPB 10 milliEquivalent(s) IV Intermittent every 1 hour  potassium phosphate IVPB 30 milliMole(s) IV Intermittent once  prednisoLONE acetate 1% Suspension 1 Drop(s) Both EYES four times a day  sodium chloride 0.9% lock flush 3 milliLiter(s) IV Push every 8 hours  sodium chloride 2% Ophthalmic Solution 1 Drop(s) Both EYES daily    MEDICATIONS  (PRN):  lidocaine   Patch 2 Patch Transdermal every 24 hours PRN back pain  morphine  - Injectable 2 milliGRAM(s) IV Push every 4 hours PRN Moderate Pain (4 - 6)  morphine  - Injectable 4 milliGRAM(s) IV Push every 4 hours PRN Severe Pain (7 - 10)    Weight:  (11/12) 106lbs (48 kg)   (11/15) 134.2lbs (60.9kg) bed   (11/21) 141.4lbs (64.3kg) bed  *unable to obtain updated weight 2/2 bedscale error    Weight Change:   Nutrition Focused Physical Exam: Completed [ X-11/13/19  ]  Not Pertinent [   ]  From 11/13/19: If admitted weight is accurate, this would indicate a 16lb unintentional wt loss (13% wt change) x2 weeks.   Pt noted to have moderate muscle wasting in clavicular region. Moderate protein calorie malnutrition suspected.     Estimated energy needs:   Height: 5'7" Weight: 105lbs, IBW 135lbs+/-10%, %IBW 77%, BMI 16.4   ABW used for calculations as pt is <80% IBW. Needs adjusted for suspected malnutrition, +pressure injury, underweight, post-op healing  (25-30 kcal/kg): 8619-2638 kcal/day  (1.4-1.6 g/kg): 66-76 g protein/day  (30-35 ml/kg): 1301-1836 ml/day    Subjective:   Pt is a 68 y.o F h/o aortic dissection s/p multiple repairs, spinal hematoma resulting in paraplegia, and embolization of splenic and L gastric arteries for GI bleeding, now admitted for GI bleed. Pt was admitted last week where she required splenic and L gastric arterial embolization (11/7). After d/c pt presented at [place] [facility] for AMS and was transferred to [facility] after being found w/ hypotension and anemia. Pt w/ acute drop in hgb and HD instability following melenic stool. S/p bedside EGD 11/12 showing large amounts of blood in the stomach but no active bleeding. Pt also w/ notable large amounts of stool and rectal distension on CT. Pt was initially intubated for airway protection, now extubated. Pt was transferred from CT surgery to MICU for further management.  Was planned for EUS which was deferred 11/15-11/18. On 11/18, pt became tachy, hypotensive, and noted to have melanotic stool. Hgb dropping again, was given 2U pRBC and placed on levo gtt. Bedside EGD showing significant amounts of clotted blood in stomach w/ no active bleed. Consensus between GI, IR, vascular and gen surgery was for total gastrectomy. Pt now POD6 s/p total gastrectomy w/ Deandra en Y reconstruction and cholecystectomy. Admitted to SICU team for post-op management. Was extubated 11/22 and weaned off sedatives 11/23. TPN was initiated 11/23 for nutrition support d/t overall insufficient intake this admission.  Pt had been primarily NPO since admission except when on a mechanical soft diet from 11/15-18. This indicates at least 13 days of NPO status. Pt is suspected to have moderate protein calorie malnutrition 2/2 13% wt change x2 weeks and visible muscle wasting in temporal and clavicular region. Noted, pt is also at high risk for further unintentional wt loss s/p total gastrectomy. Pt stepped down to 8LA on 11/23. UGI completed 11/25- negative for leak. Pt was advanced to BARICLLIQ 11/24 and then to Phase 1 Bariatric Full Liquids this am 11/27. Stepped up to 8east. Pt had one episode of vomiting with small amount of blood, followed by maroon stools with hypotension 11/30. NPO for colonoscoy today with GI.     Previous Nutrition Diagnosis:  Moderate protein-calorie malnutrition RT unclear etiology AEB 13% wt loss x 2 weeks, NFPE findings include moderate muscle wasting.   Active [ X  ]  Resolved [   ]    Goal: Pt to consistently meet % of estimated needs PO     Recommendations:  1) Recommend diet advancement: BARICLLIQ -> bariatric phase 1 full liquids -> bariatric phase 2 pureed/soft once medically feasible.  2) Monitor POCT BG Q6H   3) Obtain and monitor wt trends.   4) Recommend chewable MVI, B12, OsCal. Iron supplementation per MD.      Education:   Previously educated on purpose of gradual advancement, edu on nutrition therapy s/p total gastrectomy    Risk Level: High [ X ]  Moderate [   ]  Low [   ] Admitting Diagnosis:   Patient is a 68y old  Female who presents with a chief complaint of Recurrent UGIB (23 Nov 2019 10:28)    PAST MEDICAL & SURGICAL HISTORY:  Melena: 10/7/2019  Gastrointestinal hemorrhage: 9/28/2019, 9/4/2019, 8/29/2019  Dieulafoy lesion of stomach or duodenum: 10/1/2019  Subdural hematoma, nontraumatic: spontaneous  Dorsalgia of lumbar region: on pain medication /baclofen po and pump  Self-catheterizes urinary bladder  Anemia: chronic  Uveitis  Osteoporosis  PAD (peripheral artery disease)  Hematoma: spinal treated September 2018  Paraplegia: due to spinal hematoma, on wheelchair goes to physical therapy 2 x weekly  Aortic dissection, thoracic: Type A Repaired 2009  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 2018  UTI (urinary tract infection): recurrent  TIA (transient ischemic attack)  HTN (Hypertension)  History of corneal transplant: left corneal transplant on 5/21/2018  Disorder of spine: unthetethering 2 x  Presence of IVC filter: 2014 ?  S/P aortic dissection repair: Type A Dissection repair /2009   descending aortic aneurysm repair 9/2016  H/O Spinal surgery: laminectomies 2014    Current Nutrition Order:  Diet, NPO after Midnight:   NPO Start Date: 01-Dec-2019,   NPO Start Time: 23:59 (12-01-19 @ 08:44)    PO Intake: Good (%) [   ]  Fair (50-75%) [   ] Poor (<25%) [   ]-N/A as pt NPO    GI Issues: abdomen distended, large liquid BM 12/2 +rectal tube (500mL x24h), pt feels like vomiting after taking colonoscopy prep, no N/V at baseline per pt, last BM     Pain: dull, constant abdominal pain, pain also reports pain near IV access    Skin Integrity: Randal score 15, erythema perineum, stage II healed wound on coccyx    Labs:   12-02    141  |  105  |  5<L>  ----------------------------<  97  2.9<LL>   |  29  |  0.38<L>    Ca    8.0<L>      02 Dec 2019 04:52  Phos  2.9     12-02  Mg     2.0     12-02    TPro  6.0  /  Alb  3.2<L>  /  TBili  0.4  /  DBili  x   /  AST  See Note  /  ALT  See Note  /  AlkPhos  161<H>  12-01    CAPILLARY BLOOD GLUCOSE    POCT Blood Glucose.: 84 mg/dL (02 Dec 2019 05:59)  POCT Blood Glucose.: 102 mg/dL (01 Dec 2019 22:56)  POCT Blood Glucose.: 82 mg/dL (01 Dec 2019 16:00)  POCT Blood Glucose.: 86 mg/dL (01 Dec 2019 11:42)    Medications:  MEDICATIONS  (STANDING):  acyclovir   Oral Tab/Cap 1000 milliGRAM(s) Oral every 8 hours  artificial  tears Solution 1 Drop(s) Both EYES two times a day  atorvastatin 40 milliGRAM(s) Oral at bedtime  baclofen 20 milliGRAM(s) Oral two times a day  Baclofen 480 MICROgram(s)/Day,Morphine 2.4 mG/Day,Baclofen 480 MICROGram(s),Morphine 2.4 milliGRAM(s),Baclofen 480 MICROGram(s),Morphine 2.4 milliGRAM(s) 480 MICROGram(s) IntraThecal Continuous Pump  chlorhexidine 4% Liquid 1 Application(s) Topical <User Schedule>  dextrose 5% + lactated ringers 1000 milliLiter(s) (80 mL/Hr) IV Continuous <Continuous>  dextrose 50% Injectable 12.5 Gram(s) IV Push once  dextrose 50% Injectable 25 Gram(s) IV Push once  DULoxetine 20 milliGRAM(s) Oral two times a day  gabapentin 600 milliGRAM(s) Oral three times a day  insulin lispro (HumaLOG) corrective regimen sliding scale   SubCutaneous every 6 hours  pantoprazole  Injectable 40 milliGRAM(s) IV Push two times a day  potassium chloride  10 mEq/100 mL IVPB 10 milliEquivalent(s) IV Intermittent every 1 hour  potassium chloride  10 mEq/100 mL IVPB 10 milliEquivalent(s) IV Intermittent every 1 hour  potassium phosphate IVPB 30 milliMole(s) IV Intermittent once  prednisoLONE acetate 1% Suspension 1 Drop(s) Both EYES four times a day  sodium chloride 0.9% lock flush 3 milliLiter(s) IV Push every 8 hours  sodium chloride 2% Ophthalmic Solution 1 Drop(s) Both EYES daily    MEDICATIONS  (PRN):  lidocaine   Patch 2 Patch Transdermal every 24 hours PRN back pain  morphine  - Injectable 2 milliGRAM(s) IV Push every 4 hours PRN Moderate Pain (4 - 6)  morphine  - Injectable 4 milliGRAM(s) IV Push every 4 hours PRN Severe Pain (7 - 10)    Weight:  (11/12) 106lbs (48 kg)   (11/15) 134.2lbs (60.9kg) bed   (11/21) 141.4lbs (64.3kg) bed    Weight Change:   Nutrition Focused Physical Exam: Completed [ X-11/13/19  ]  Not Pertinent [   ]  From 11/13/19: If admitted weight is accurate, this would indicate a 16lb unintentional wt loss (13% wt change) x2 weeks.   Pt noted to have moderate muscle wasting in clavicular region. Moderate protein calorie malnutrition suspected.     Estimated energy needs:   Height: 5'7" Weight: 105lbs, IBW 135lbs+/-10%, %IBW 77%, BMI 16.4   ABW used for calculations as pt is <80% IBW. Needs adjusted for suspected malnutrition, +pressure injury, underweight, post-op healing  (25-30 kcal/kg): 2556-5756 kcal/day  (1.4-1.6 g/kg): 66-76 g protein/day  (30-35 ml/kg): 9590-3644 ml/day    Subjective:   Pt is a 68 y.o F h/o aortic dissection s/p multiple repairs, spinal hematoma resulting in paraplegia, and embolization of splenic and L gastric arteries for GI bleeding, now admitted for GI bleed. Pt was admitted last week where she required splenic and L gastric arterial embolization (11/7). After d/c pt presented at [place] [facility] for AMS and was transferred to [facility] after being found w/ hypotension and anemia. Pt w/ acute drop in hgb and HD instability following melenic stool. S/p bedside EGD 11/12 showing large amounts of blood in the stomach but no active bleeding. Pt also w/ notable large amounts of stool and rectal distension on CT. Pt was initially intubated for airway protection, successfully extubated. Pt was transferred from CT surgery to MICU for further management. Was planned for EUS which was deferred 11/15-11/18. On 11/18, pt became tachy, hypotensive, and noted to have melanotic stool. Hgb dropping again, was given 2U pRBC and placed on levo gtt. Bedside EGD showing significant amounts of clotted blood in stomach w/ no active bleed. Consensus between GI, IR, vascular and gen surgery was for total gastrectomy. Pt now POD6 s/p total gastrectomy w/ Deandra en Y reconstruction and cholecystectomy. Admitted to SICU team for post-op management. Was extubated 11/22 and weaned off sedatives 11/23. TPN was initiated 11/22 for nutrition support d/t overall insufficient intake this admission, TPN discontinued on 11/23. Pt had been primarily NPO since admission except when on a mechanical soft diet from 11/15-18. This indicates at least 13 days of NPO status. Pt is suspected to have moderate protein calorie malnutrition 2/2 13% wt change x2 weeks and visible muscle wasting in temporal and clavicular region. Noted, pt is also at high risk for further unintentional wt loss s/p total gastrectomy. Pt stepped down to 8LA on 11/23. UGI completed 11/25- negative for leak. Pt was advanced to BARICLLIQ 11/24 and then to Phase 1 Bariatric Full Liquids 11/27. Now stepped up to 8 east. Pt had one episode of vomiting with small amount of blood, followed by maroon stools with hypotension 11/30. NPO for colonoscopy today with GI. Please see below for full nutritional recommendations- d/w team. RD to monitor and f/u per protocol.    Previous Nutrition Diagnosis:  Moderate protein-calorie malnutrition RT unclear etiology AEB 13% wt loss x 2 weeks, NFPE findings include moderate muscle wasting.   Active [ X  ]  Resolved [   ]    Goal: Pt to consistently meet % of estimated needs PO     Recommendations:  1) Recommend diet advancement: BARICLLIQ -> bariatric phase 1 full liquids -> bariatric phase 2 pureed/soft once medically feasible.  2) Monitor POCT BG Q6H   3) Obtain and monitor wt trends.   4) Recommend chewable MVI, B12, OsCal. Iron supplementation per MD.    5) If unable to advance diet and pt unable to meet >/=75% of EER via PO intake, consider resuming TPN.  6) Consider r/o Cdiff and/or add Metamucil to help bulk up stool    Education:   Previously educated on purpose of gradual advancement, edu on nutrition therapy s/p total gastrectomy, RD to f/u with additional diet education as medically appropriate- pt receptive to plan     Risk Level: High [ X ]  Moderate [   ]  Low [   ]

## 2019-12-02 NOTE — PROCEDURE NOTE - NSSITEPREP_SKIN_A_CORE
chlorhexidine/Adherence to aseptic technique: hand hygiene prior to donning barriers (gown, gloves), don cap and mask, sterile drape over patient
chlorhexidine
Adherence to aseptic technique: hand hygiene prior to donning barriers (gown, gloves), don cap and mask, sterile drape over patient/chlorhexidine

## 2019-12-02 NOTE — PROCEDURE NOTE - NSPROCDETAILS_GEN_ALL_CORE
patient pre-oxygenated, tube inserted, placement confirmed/connected to ventilator
patient pre-oxygenated, tube inserted, placement confirmed
sterile dressing applied/sterile technique, catheter placed/ultrasound guidance/guidewire recovered/lumen(s) aspirated and flushed
ultrasound guidance/positive blood return obtained via catheter/location identified, draped/prepped, sterile technique used, needle inserted/introduced/sutured in place/all materials/supplies accounted for at end of procedure

## 2019-12-02 NOTE — PROVIDER CONTACT NOTE (CHANGE IN STATUS NOTIFICATION) - SITUATION
pt experiencing active GI bleed status post colonoscopy. Tachycardic with HR up to 140, hypotensive with SBP 90 - bright red blood per rectum

## 2019-12-02 NOTE — PROCEDURE NOTE - NSPOSTPRCRAD_GEN_A_CORE
central line located in the superior vena cava/central line located in the/no pneumothorax/post-procedure radiography performed/depth of insertion
central line located in the superior vena cava/post-procedure radiography performed

## 2019-12-02 NOTE — PROGRESS NOTE ADULT - ASSESSMENT
68 F with PMhx of upper GI bleed s/p IR embolization of left gastric artery and splenic artery, with persistent upper GI bleeding requiring subtotal gastrectomy on 11/20, postoperatively no additional bleeding episodes, on 11/30 she passed blood clots per rectum, drop in Hb of 1 unit. Being sent for stat CT angiogram + EGC with no bleeding found and ordered for 2 units of Prbc.   Colonoscopy today     PLAN:     NEURO: Baclofen, morphine, lidocaine patch, gabapentin, duloxetine  PULM:  Room air  CARDIO:  Monitor hemodynamics, 2 U prbc 11/30 with Hb 11.0 from 6.7.   GI:  NPO, GoLytely. Colonoscopy today  RENAL:  Primafit, UCx +for citrobacter / morganella from probable colonization, asymptomatic.   VTE Prophylaxis: Holding in light of active GI bleed   ID: acyclovir for HSV, afebrile, no leukocytosis

## 2019-12-02 NOTE — PROVIDER CONTACT NOTE (CHANGE IN STATUS NOTIFICATION) - SITUATION
Pt transported back from CTA vomiting bright red blood & having active GI bleed. Pt transported back from CTA - on arrival back to unit vomiting bright red blood & having active GI bleed.

## 2019-12-02 NOTE — CONSULT NOTE ADULT - SUBJECTIVE AND OBJECTIVE BOX
[facility] Consult Note    [facility]:  Transition of care performed with sharing of clinical summary: Transition of care performed with sharing of clinical summary  Nutrition, metabolism, and development symptoms: Nutrition, metabolism, and development symptoms  Hyperlipidemia: Hyperlipidemia  Anemia due to blood loss: Anemia due to blood loss  Hypertension: Hypertension  Blindness of left eye: Blindness of left eye  Upper GI bleed: Upper GI bleed  Aortic dissection, thoracic: Aortic dissection, thoracic  Paraplegia: Paraplegia  Self-catheterizes urinary bladder: Self-catheterizes urinary bladder  Urinary tract infection associated with catheterization of urinary tract, unspecified indwelling urinary catheter type, subsequent encounter: Urinary tract infection associated with catheterization of urinary tract, unspecified indwelling urinary catheter type, subsequent encounter  Gastrointestinal hemorrhage associated with angiodysplasia of stomach and duodenum: Gastrointestinal hemorrhage associated with angiodysplasia of stomach and duodenum  Anemia, unspecified type: Anemia, unspecified type             Diagnosis: upper gi bleed     Indications for Vascular Access (Check all that apply)  [  ]  Antibiotic Therapy       Antibiotic Prescribed:                                                                             Expected Duration of Therapy:               [  ]  IV Hydration  [  ]  Total Parenteral Nutrition  [  ]  Chemotherapy  [ X ]  Difficult Venous Access  [  ]  CVP monitoring  [  ]  Medications with high potential for tissue necrosis on extravasation  [  ]  Other    Screening (Check all that apply)  Previous Radiation to chest  [  ] Yes      [X  ]  No  Breast Cancer                          [  ] Left     [  ]  Right    [X  ]  No  Lymph Node Dissection         [  ] Left     [  ]  Right    [ X ]  No  Pacemaker or ICD                   [  ] Left     [  ]  Right    [ X ]  No  Upper Extremity DVT             [  ] Left     [  ]  Right    [  X]  No  Chronic Kidney Disease         [  ]  Yes     [ X ]  No  Hemodialysis                           [  ]  Yes     X  ]  No  AV Fistula/ Graft                     [  ]  Left    [  ]  Right    [X  ]  No  Temp>101F in past 24 H       [  ]  Yes     [ X ]  No  H/O PICC/Midline                   [ X ]  Yes     [  ]  No    Lab data:                        10.1   9.08  )-----------( 419      ( 02 Dec 2019 04:52 )             32.5     12-02    141  |  105  |  3<L>  ----------------------------<  93  4.6   |  28  |  0.36<L>    Ca    8.0<L>      02 Dec 2019 13:26  Phos  2.9     12-02  Mg     2.0     12-02    TPro  6.0  /  Alb  3.2<L>  /  TBili  0.4  /  DBili  x   /  AST  See Note  /  ALT  See Note  /  AlkPhos  161<H>  12-01    PT/INR - ( 02 Dec 2019 04:52 )   PT: 13.2 sec;   INR: 1.16          PTT - ( 02 Dec 2019 04:52 )  PTT:38.5 sec          I have reviewed the chart, interviewed and examined the patient and determined that this patient:  [  X] Is a candidate for a PICC line  [  ] Is a candidate for a Midline  [  ] Is not a candidate for vascular access device (reason)    Lumens:    [ X ] Single  [  ] Double

## 2019-12-02 NOTE — CONSULT NOTE ADULT - SUBJECTIVE AND OBJECTIVE BOX
Vascular Attending: Dr. Donaldson    HPI:  69 y/o Female with h/o aortic dissection s/p multiple repairs, spinal hematoma resulting in paraplegia (on baclofen pump), nephrolithiasis with retained stent (did not f/u to remove as per pt s/p d/c home), recurrent UTIs ESBL positive in the past 2/2 to straight catheterizations, HTN, PAD, and HSV endophthalmitis/keratitis s/p left corneal transplant who presented to [facility] on 10/28/19 with lethargy, weakness, malaise, and endorsing dark stools. Of note, patient has a history of recurrent GIB, and has been hospitalized multiple times with endoscopies, and CTA's which have all been without source of bleeding. On day of admission to OSH, the patient's hemoglobin was 5.2, and s/p 6 uPRBCs. S/p endoscopy on 10/31/19, found to have red blood in the gastric fundus and body but the source of the blood could not be found. 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 The patient was intubated and placed under ICU care for acute hypoxic respiratory failure and then extubated the next morning. Of note, pt was also being treated for UTI with meropenem given history of ESBL.  She was then transferred to regular floor and had 2 episodes of melena, underwent 1UpRBCs then underwent another EGD on 19 and had 2 clips placed at site of previous clippings.     Of note, during her hospitalization she was followed by urology and was planned to undergo laser lithotripsy and stent exchange on 19 but was unable to undergo procedure secondary to transfer.      She was transferred to [facility] on  for further management. On 19, patient underwent splenic and left gastric arterial embolizations with vascular surgery, Dr. Rock. On POD2 morning labs significant for H&H 7.9/26.2. Discharged home 11/10/19    Returned to [facility] ED via EMS after  finding her with altered mental status. EMS finds her with BP 80/40 , tachycardic 140. In ED hemoglobin 7, lactic acid 8 BP 80's systolic. Transfused 2 PRBC hemoglobin 8. No evidence of gross UGI bleed. Transferred to [facility] under Dr. Barriga for further evaluation and management. (2019 03:33)    Vascular Surgery:  68 F with PMhx of upper GI bleed s/p IR embolization of left gastric artery and splenic artery, with persistent upper GI bleeding requiring subtotal gastrectomy on 19, postoperatively no additional bleeding episodes, on 19 she passed blood clots per rectum, drop in Hb of 1 unit. Being sent for stat CT angiogram + EGC with no bleeding found and ordered for 2 units of Prbc. Pt underwent colonoscopy today and there were no bleeding source identified. Pt started having BRBPR about 2hrs ago and hematemesis for which she was transfused 3u PRBC and vascular surgery consulted for evaluation for possible mesentery angiogram.    PAST MEDICAL & SURGICAL HISTORY:  Melena: 10/7/2019  Gastrointestinal hemorrhage: 2019, 2019, 2019  Dieulafoy lesion of stomach or duodenum: 10/1/2019  Subdural hematoma, nontraumatic: spontaneous  Dorsalgia of lumbar region: on pain medication /baclofen po and pump  Self-catheterizes urinary bladder  Anemia: chronic  Uveitis  Osteoporosis  PAD (peripheral artery disease)  Hematoma: spinal treated 2018  Paraplegia: due to spinal hematoma, on wheelchair goes to physical therapy 2 x weekly  Aortic dissection, thoracic: Type A Repaired   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 2018  UTI (urinary tract infection): recurrent  TIA (transient ischemic attack)  HTN (Hypertension)  History of corneal transplant: left corneal transplant on 2018  Disorder of spine: unthetethering 2 x  Presence of IVC filter:  ?  S/P aortic dissection repair: Type A Dissection repair /   descending aortic aneurysm repair 2016  H/O Spinal surgery: laminectomies       MEDICATIONS  (STANDING):  acyclovir   Oral Tab/Cap 1000 milliGRAM(s) Oral every 8 hours  artificial  tears Solution 1 Drop(s) Both EYES two times a day  atorvastatin 40 milliGRAM(s) Oral at bedtime  baclofen 20 milliGRAM(s) Oral two times a day  Baclofen 480 MICROgram(s)/Day,Morphine 2.4 mG/Day,Baclofen 480 MICROGram(s),Morphine 2.4 milliGRAM(s),Baclofen 480 MICROGram(s),Morphine 2.4 milliGRAM(s) 480 MICROGram(s) IntraThecal Continuous Pump  chlorhexidine 4% Liquid 1 Application(s) Topical <User Schedule>  dextrose 5% + sodium chloride 0.9%. 1000 milliLiter(s) (80 mL/Hr) IV Continuous <Continuous>  dextrose 50% Injectable 12.5 Gram(s) IV Push once  dextrose 50% Injectable 25 Gram(s) IV Push once  DULoxetine 20 milliGRAM(s) Oral two times a day  fentaNYL   Infusion 0.5 MICROgram(s)/kG/Hr (2.75 mL/Hr) IV Continuous <Continuous>  gabapentin 600 milliGRAM(s) Oral three times a day  insulin lispro (HumaLOG) corrective regimen sliding scale   SubCutaneous every 6 hours  norepinephrine Infusion 0.05 MICROgram(s)/kG/Min (2.578 mL/Hr) IV Continuous <Continuous>  pantoprazole  Injectable 40 milliGRAM(s) IV Push two times a day  prednisoLONE acetate 1% Suspension 1 Drop(s) Both EYES four times a day  sodium chloride 0.9% lock flush 3 milliLiter(s) IV Push every 8 hours  sodium chloride 2% Ophthalmic Solution 1 Drop(s) Both EYES daily  vasopressin Infusion 0.04 Unit(s)/Min (2.4 mL/Hr) IV Continuous <Continuous>    MEDICATIONS  (PRN):  fentaNYL    Injectable 50 MICROGram(s) IV Push once PRN For colonoscopy  lidocaine   Patch 2 Patch Transdermal every 24 hours PRN back pain  midazolam Injectable 2 milliGRAM(s) IV Push every 10 minutes PRN for colonoscopy  morphine  - Injectable 2 milliGRAM(s) IV Push every 4 hours PRN Moderate Pain (4 - 6)  morphine  - Injectable 4 milliGRAM(s) IV Push every 4 hours PRN Severe Pain (7 - 10)    Allergies    No Known Allergies    Intolerances    SOCIAL HISTORY:    FAMILY HISTORY:  No pertinent family history in first degree relatives: pt does not recall family history of medical problems in mother or father, both     Vital Signs Last 24 Hrs  T(C): 36.6 (02 Dec 2019 17:50), Max: 37.1 (02 Dec 2019 05:00)  T(F): 97.8 (02 Dec 2019 17:50), Max: 98.7 (02 Dec 2019 05:00)  HR: 110 (02 Dec 2019 19:40) (68 - 142)  BP: 131/87 (02 Dec 2019 19:40) (91/53 - 197/91)  BP(mean): 98 (02 Dec 2019 19:30) (67 - 129)  RR: 12 (02 Dec 2019 19:40) (8 - 24)  SpO2: 98% (02 Dec 2019 19:40) (86% - 100%)    PHYSICAL EXAM: Limited exam as Pt was taken for CTA  Constitutional: NAD  Respiratory: Unlabored breathing  Cardiovascular: Tachycardia, regular rhythm      LABS:                        9.0    7.30  )-----------( 439      ( 02 Dec 2019 18:44 )             28.6     12    141  |  106  |  3<L>  ----------------------------<  128<H>  4.8   |  26  |  0.42<L>    Ca    8.1<L>      02 Dec 2019 18:44  Phos  2.9     12  Mg     2.0         TPro  6.0  /  Alb  3.2<L>  /  TBili  0.4  /  DBili  x   /  AST  See Note  /  ALT  See Note  /  AlkPhos  161<H>  12-01    PT/INR - ( 02 Dec 2019 04:52 )   PT: 13.2 sec;   INR: 1.16     PTT - ( 02 Dec 2019 04:52 )  PTT:38.5 sec      RADIOLOGY & ADDITIONAL STUDIES    Assessment/Plan;  69 y/o Female with h/o aortic dissection s/p multiple repairs, spinal hematoma resulting in paraplegia (on baclofen pump), nephrolithiasis with retained stent (did not f/u to remove as per pt s/p d/c home), recurrent UTIs ESBL positive in the past 2/2 to straight catheterizations, HTN, PAD, and HSV endophthalmitis/keratitis s/p left corneal transplant upper GI bleed s/p IR embolization of left gastric artery and splenic artery, with persistent upper GI bleeding requiring subtotal gastrectomy on 19, postoperatively no additional bleeding episodes, on 19 now with active GI bleeding requiring multiple units of blood transfusion     Plan;  CTA performed, no active bleeding identified; F/u official reading   Consult GI for endoscopy, as bleeding is likely upper GI source   No vascular surgery intervention at this time. Vascular available if mesentery angiogram is indicated.  Plan discussed with vascular surgery chief on call

## 2019-12-02 NOTE — PROVIDER CONTACT NOTE (CHANGE IN STATUS NOTIFICATION) - ACTION/TREATMENT ORDERED:
3 unit PRBC, 1 unit plasma, 1 unit platelets, emergently intubated, levophed & fentanyl gtt started code fusion - 3 unit PRBC, 1 unit plasma, 1 unit platelets  emergently intubated, levophed & fentanyl gtt started, central line placed code fusion - 3 unit PRBC, 1 unit plasma, 1 unit platelets  emergently intubated, levophed, propofol & fentanyl gtt started for intubation, central line placed

## 2019-12-02 NOTE — PROCEDURE NOTE - NSICDXPROCEDURE_GEN_ALL_CORE_FT
PROCEDURES:  Insertion, arterial line, percutaneous 19-Nov-2019 01:40:52  Shaina Wolf
PROCEDURES:  Central venous catheter placement with ultrasound guidance 02-Dec-2019 21:48:09  Ortega Bradford

## 2019-12-02 NOTE — PROCEDURE NOTE - SUPERVISORY STATEMENT
The patient was hypotensive, has a GIB, the previous radial arterial line was dislodged and attempt at placing another arterial line on the left failed.  A left axillary arterial was -placed under ultrasound guidance.  This was done with one puncture and there was no complication.
There was no complication, it was done under ultrasound guidance.  Line placement was confirmed with ultrasound and CXR.

## 2019-12-02 NOTE — PROCEDURE NOTE - NSTRACHPOSTINTU_RESP_A_CORE
Appropriate capnography/Breath sounds equal/Breath sounds bilateral/Positive end tidal Co2 noted
Appropriate capnography/Breath sounds bilateral/Breath sounds equal/Chest X-Ray/Chest excursion noted/Positive end tidal Co2 noted

## 2019-12-02 NOTE — PROGRESS NOTE ADULT - SUBJECTIVE AND OBJECTIVE BOX
SUBJECTIVE:  Patient awake and alert, no new complaints  Drinking the prep for colonoscopy later today   No acute events overnight     MEDICATIONS  (STANDING):  acyclovir   Oral Tab/Cap 1000 milliGRAM(s) Oral every 8 hours  artificial  tears Solution 1 Drop(s) Both EYES two times a day  atorvastatin 40 milliGRAM(s) Oral at bedtime  baclofen 20 milliGRAM(s) Oral two times a day  Baclofen 480 MICROgram(s)/Day,Morphine 2.4 mG/Day,Baclofen 480 MICROGram(s),Morphine 2.4 milliGRAM(s),Baclofen 480 MICROGram(s),Morphine 2.4 milliGRAM(s) 480 MICROGram(s) IntraThecal Continuous Pump  dextrose 5% + sodium chloride 0.9%. 1000 milliLiter(s) (80 mL/Hr) IV Continuous <Continuous>  dextrose 50% Injectable 12.5 Gram(s) IV Push once  dextrose 50% Injectable 25 Gram(s) IV Push once  DULoxetine 20 milliGRAM(s) Oral two times a day  gabapentin 600 milliGRAM(s) Oral three times a day  insulin lispro (HumaLOG) corrective regimen sliding scale   SubCutaneous every 6 hours  magnesium citrate Oral Solution 1 Bottle Oral once  pantoprazole  Injectable 40 milliGRAM(s) IV Push two times a day  polyethylene glycol/electrolyte Solution. 4000 milliLiter(s) Oral once  prednisoLONE acetate 1% Suspension 1 Drop(s) Both EYES four times a day  sodium chloride 0.9% lock flush 3 milliLiter(s) IV Push every 8 hours  sodium chloride 2% Ophthalmic Solution 1 Drop(s) Both EYES daily    MEDICATIONS  (PRN):  lidocaine   Patch 2 Patch Transdermal every 24 hours PRN back pain  morphine  - Injectable 2 milliGRAM(s) IV Push every 4 hours PRN Moderate Pain (4 - 6)  morphine  - Injectable 4 milliGRAM(s) IV Push every 4 hours PRN Severe Pain (7 - 10)    ICU Vital Signs Last 24 Hrs  T(C): 36.4 (01 Dec 2019 05:17), Max: 36.7 (30 Nov 2019 13:03)  T(F): 97.6 (01 Dec 2019 05:17), Max: 98 (30 Nov 2019 13:03)  HR: 72 (01 Dec 2019 08:00) (54 - 103)  BP: 124/60 (01 Dec 2019 08:00) (94/46 - 162/60)  BP(mean): 78 (01 Dec 2019 08:00) (65 - 118)  ABP: --  ABP(mean): --  RR: 10 (01 Dec 2019 08:00) (9 - 34)  SpO2: 96% (01 Dec 2019 08:00) (96% - 100%)      General: No acute distress  Neurology: Alert and oriented, moving all extremities  HEENT: Scleras clear, chronic left ptosis  Respiratory: Normal work of breathing, lungs clear to auscultation  CV: RRR, ejection murmur best heard at aortic area, no carotid bruit   Abdominal: Soft, distended, pump on anterior abdominal wall, laparoscopy incisions CDI  Extremities: No edema, no deformity   Psych: Oriented and calm, cooperative   Skin; no rash, no wound                          10.1   8.09  )-----------( 440      ( 01 Dec 2019 03:13 )             33.2   11-30    141  |  109<H>  |  15  ----------------------------<  91  4.4   |  23  |  0.44<L>    Ca    7.9<L>      30 Nov 2019 10:54  Phos  2.8     11-30  Mg     1.8     11-30

## 2019-12-02 NOTE — PROGRESS NOTE ADULT - ASSESSMENT
68F with history of spinal hematoma resulting in paraplegia with hyperspasticity requiring baclofen pump, HTN, and PAD, admitted  admitted for AMS and recurrent UGIB, found to have UTI, s/p failed embolization and multiple endoscopies, now s/p laparoscopic total gastrectomy with scar-en-y reconstruction and cholecystectomy on 11/20 with one episode of vomiting with small amount of blood, followed by maroon stools with hypotension 11/30.    Neuro: baclofen, gabapentin  CV: HD monitoring.   Pulm: no increased requirements  GI/FEN: CLD. continue GoLytely prep. Plan for colonoscopy today with GI   : adequate UOP  ID: acyclovir for HSV  Endo: ISS  Heme: hgb 10.1 from 10.6 in the evening yesterday, stable for almost 12 hours.  PPx: PPI for GI ppx. chemical DVT ppx held in light of bleeding  Wound: c/d/i

## 2019-12-02 NOTE — PROVIDER CONTACT NOTE (CRITICAL VALUE NOTIFICATION) - ACTION/TREATMENT ORDERED:
Potassium 2.9 PA Lisandro aware
MD to talk with chief about possible blood transfusion. will continue to monitor.

## 2019-12-02 NOTE — PROGRESS NOTE ADULT - SUBJECTIVE AND OBJECTIVE BOX
DAILY PROGRESS NOTE:    INTERVAL HPI/OVERNIGHT EVENTS/ SUBJECTIVE: No acute events overnight. Patient attempted to drink the bowel prep. No nausea or vomiting overnight. Has rectal rube in place. Admits to dull, constant abdominal pain.    POST OPERATIVE DAY #: 12      MEDICATIONS  (STANDING):  acyclovir   Oral Tab/Cap 1000 milliGRAM(s) Oral every 8 hours  artificial  tears Solution 1 Drop(s) Both EYES two times a day  atorvastatin 40 milliGRAM(s) Oral at bedtime  baclofen 20 milliGRAM(s) Oral two times a day  Baclofen 480 MICROgram(s)/Day,Morphine 2.4 mG/Day,Baclofen 480 MICROGram(s),Morphine 2.4 milliGRAM(s),Baclofen 480 MICROGram(s),Morphine 2.4 milliGRAM(s) 480 MICROGram(s) IntraThecal Continuous Pump  chlorhexidine 4% Liquid 1 Application(s) Topical <User Schedule>  dextrose 5% + sodium chloride 0.9%. 1000 milliLiter(s) (80 mL/Hr) IV Continuous <Continuous>  dextrose 50% Injectable 12.5 Gram(s) IV Push once  dextrose 50% Injectable 25 Gram(s) IV Push once  DULoxetine 20 milliGRAM(s) Oral two times a day  gabapentin 600 milliGRAM(s) Oral three times a day  insulin lispro (HumaLOG) corrective regimen sliding scale   SubCutaneous every 6 hours  pantoprazole  Injectable 40 milliGRAM(s) IV Push two times a day  potassium chloride  10 mEq/100 mL IVPB 10 milliEquivalent(s) IV Intermittent every 1 hour  potassium chloride  10 mEq/100 mL IVPB 10 milliEquivalent(s) IV Intermittent every 1 hour  prednisoLONE acetate 1% Suspension 1 Drop(s) Both EYES four times a day  sodium chloride 0.9% lock flush 3 milliLiter(s) IV Push every 8 hours  sodium chloride 2% Ophthalmic Solution 1 Drop(s) Both EYES daily    MEDICATIONS  (PRN):  lidocaine   Patch 2 Patch Transdermal every 24 hours PRN back pain  morphine  - Injectable 2 milliGRAM(s) IV Push every 4 hours PRN Moderate Pain (4 - 6)  morphine  - Injectable 4 milliGRAM(s) IV Push every 4 hours PRN Severe Pain (7 - 10)      Vital Signs Last 24 Hrs  T(C): 37.1 (02 Dec 2019 05:24), Max: 37.1 (01 Dec 2019 18:06)  T(F): 98.7 (02 Dec 2019 05:24), Max: 98.8 (01 Dec 2019 18:06)  HR: 82 (02 Dec 2019 06:00) (70 - 92)  BP: 165/75 (02 Dec 2019 06:00) (124/60 - 187/84)  BP(mean): 108 (02 Dec 2019 06:00) (78 - 129)  RR: 15 (02 Dec 2019 06:00) (8 - 24)  SpO2: 96% (02 Dec 2019 06:00) (95% - 99%)    I&O's Detail    01 Dec 2019 07:01  -  02 Dec 2019 07:00  --------------------------------------------------------  IN:    dextrose 5% + sodium chloride 0.9%.: 1920 mL    Oral Fluid: 2720 mL  Total IN: 4640 mL    OUT:    Voided: 2950 mL  Total OUT: 2950 mL    Total NET: 1690 mL      Physical Exam:      Gen: NAD, resting in bed  Neuro: alert, oriented to time but refuses to answer remaining orienting questions  Resp: No incr WOB  CV: NSR  Abd: Soft, NT, ND  : primafit in place  Rectal: rectal tube in place with loose brown stool  Extr: WWP, no edema, DP intact b/l    LABS:                        10.1   9.08  )-----------( 419      ( 02 Dec 2019 04:52 )             32.5     12-02    141  |  105  |  5<L>  ----------------------------<  97  2.9<LL>   |  29  |  0.38<L>    Ca    8.0<L>      02 Dec 2019 04:52  Phos  2.9     12-02  Mg     2.0     12-02    TPro  6.0  /  Alb  3.2<L>  /  TBili  0.4  /  DBili  x   /  AST  See Note  /  ALT  See Note  /  AlkPhos  161<H>  12-01    PT/INR - ( 02 Dec 2019 04:52 )   PT: 13.2 sec;   INR: 1.16          PTT - ( 02 Dec 2019 04:52 )  PTT:38.5 sec

## 2019-12-02 NOTE — PROVIDER CONTACT NOTE (CHANGE IN STATUS NOTIFICATION) - ACTION/TREATMENT ORDERED:
3 unit PRBC given, 2 liter LR bolus given, emergently sent for CTA. 3 unit PRBC given, 2 liter LR bolus given, 12 lead EKG, labs sent (CBC, BMP, troponins), emergently sent for CTA.

## 2019-12-02 NOTE — CONSULT NOTE ADULT - PROVIDER SPECIALTY LIST ADULT
Intervent Radiology
Urology
Vascular Surgery
Gastroenterology
SICU
Vascular Surgery
Critical Care
Surgery

## 2019-12-02 NOTE — PROCEDURE NOTE - NSCVLACTUALSITE_VASC_A_CORE
Quality 402: Tobacco Use And Help With Quitting Among Adolescents: Patient screened for tobacco and is a smoker AND received Cessation Counseling
Quality 394b: Td/Tdap Immunizations For Adolescents: Patient had one tetanus, diphtheria toxoids and acellular pertussis vaccine (Tdap) on or between the patient's 10th and 13th birthdays.
Quality 131: Pain Assessment And Follow-Up: Pain assessment using a standardized tool is documented as negative, no follow-up plan required
Quality 130: Documentation Of Current Medications In The Medical Record: Current Medications Documented
Detail Level: Detailed
right/internal jugular

## 2019-12-03 NOTE — CHART NOTE - NSCHARTNOTEFT_GEN_A_CORE
Patient seen and examined at bedside with critical care team, vascular surgery team, GI team and pulm team at bedside as the patient has experienced an uncontrollable bleeding episodes. The patient initially tachycardic with blood per rectum taken down for an emergent CTA which was non revealing. Required emergent intubation due to loss of airway/massive hematemasis. Patient already had underwent 3 rounds of masssive transfusion protocol with an eventual localization of a fast bleeder from her mid esophagus which was controlled by endoclips and seemed hemostatic. The patient continued to experience bleeding now from her ET tube with complete white out of her right lung causing hypoxemia and likely further attributing to her coagulopathic state. Currently patient is not a candidate for any vascular intervention, eg embolization of bronchial arteries due to her prior thoracic endovascular graft. Currently her ET tube was purposfully main stemmed in right main bronchus to attempt to tamponade bleed from left lung. Goals of care discussion held with family members which continue to pursue further aggressive care until their arrival at bedside for which will mend for further discussions. Currently the general consensus among all services portends for poor outcomes for the patient regardless of intervention or not. Currently will continue aggressive volume/blood replacement as per MTP protocol.

## 2019-12-03 NOTE — PROVIDER CONTACT NOTE (CHANGE IN STATUS NOTIFICATION) - BACKGROUND
11/20 - total gastrectomy & cholecystectomy for GI bleed
Pt admitted for recurrent GI bleed
Pt is DNR  with active oral and rectal bleed.
11/20- total gastrectomy & cholecystectomy  12/2 - colonoscopy, no abnormal findings  12/2 - CTA for active GI bleed, 3 unit PRBC & 2 liters LR to stabilize prior to CTA.

## 2019-12-03 NOTE — PROGRESS NOTE ADULT - SUBJECTIVE AND OBJECTIVE BOX
Ms. Portillo is a 67 y/o female with AAA and repair, UGIB with multiple GI interventions.  She is s/p gastrectomy with Rou-en-Y.  She had melena and colonoscopy today showed no bleeding vessel.    At 6pm she had SVT with rates to 160/min.   She also had BRBPR Q10 mins x2 to an estimated total of 500mls.    She had CT angio and on return route to the SICU she vomited copious amounts of bright red blood.  Anesthesiology was called to intubate her, she was a difficult intubation, there was copious amounts of bright red blood filling the oral cavity, and the vocal cords could not be visualized.  Anesthesiologist back up was called and finally she was intubated.    She was given rapidly PRBC, FFP and plts together with IVF.  PIV was difficult to obtain and a RIJ CVC was placed under ultrasound guidance.    Also, the radial arteries were very small and could not be cannulated so a right side axillary arterial line was placed under ultrasound guidance.  She was started on levophed for hemorrhagic shock.  Later there was still copious blood from the ETT, CXR showed right lung opacification, her right main stem bronchus was intubated and she was placed on the left lateral decubitus position.  A/P:  -hemorrhagic shock  -active GI bleeding  -acute respiratory failure, now intubated and on ventilator support  -acute hemorrhagic anemia  >propofol to RASS -2, fentanyl gtt for pain control  >Ventilator 350/14/7/ titrate the FiO2 to keep the SO2 above 94%  >CXR for ETT and CVC  >ABG  >keep CVC 8 to 10  >titrate levophed to keep MAP 65 to 70mmhg  >ECG, trops  >PPI gtt  >DDAVP  >unasyn for signification aspiration and aspiration PNA risk  >paraplegia, place hitchcock catheter  CC time 55 mins

## 2019-12-03 NOTE — PROVIDER CONTACT NOTE (CHANGE IN STATUS NOTIFICATION) - ASSESSMENT
HYpotensive (58/46mmHG), O2 Sat 97%, RR;16, and HR:92
Pt had large melena stool. , BP 90/50, 95% SpO2 on RA, 100.7 F rectal, RR 22. Patient appears pale.
tachycardic up to 140, BP 91/53, bright red blood per rectum. alert & oriented, EBL 1500 cc.
vomiting bright red blood & bright red blood per rectum. Hypotensive as low as 63/48, tachycardic HR up to 160. Alert on arrival to unit.

## 2019-12-03 NOTE — DISCHARGE NOTE FOR THE EXPIRED PATIENT - HOSPITAL COURSE
69 y/o female with PMHx of aortic dissection s/p multiple repairs, spinal hematoma resulting in paraplegia (on baclofen pump), nephrolithiasis with retained stent, recurrent UTIs ESBL positive in the past, HTN, PAD, and blindness left eye presents to the ED BIBEMS from home for AMS. EMS reports pt had non palpable BP when they picked up the pt, on ED arrival pt's BP 84/51 and is tachycardic. Patient had a recent history of a splenic and left gastric arterial embolizations with Dr. Rock (11/7/19). She experienced multiple bleeding episodes requiring transfusion. On 11/12 EGD showed old blood without clear source of bleeding. Pt was treated with antibiotics for UTI. 69 y/o female with PMHx of aortic dissection s/p multiple repairs, spinal hematoma resulting in paraplegia (on baclofen pump), nephrolithiasis with retained stent, recurrent UTIs ESBL positive in the past, HTN, PAD, and blindness left eye presents to the ED BIBEMS from home for AMS. EMS reports pt had non palpable BP when they picked up the pt, on ED arrival pt's BP 84/51 and is tachycardic. Patient had a recent history of a splenic and left gastric arterial embolizations with Dr. Rock (11/7/19). She experienced multiple bleeding episodes requiring transfusion. On 11/12 EGD showed old blood without clear source of bleeding and Angio was also negative for a source. Pt was treated with antibiotics for UTI. She became hemodynamically unstable again on 11/18 again requiring transfusion and vasopressors. Again bedside endoscopy could not localize bleeding. It was assumed that the location was gastric. The patient was taken to the OR for gastrectomy with scar en y reconstruction on 11/20. Surgery went well and initially patient did not have any additional bleeding until 11/30 when she began bleeding again. Endoscopy and Angiograms were again negative. Patient was prepped then had a colonoscopy on 12/2. Acutely in the evening on 12/2 patient went into hemorrhagic shock with brisk BRBPR. Pt was sent for a CT angiogram which did not localize bleeding and upon return began to have copious hemoptysis requiring intubation. EGD then showed pulsatile area with ulcer appearance in the esophagus and 2 clips were placed. During endoscopy, pt began to have blood spill from her ET tube and xray revealed hemorrhage into her left lung. Her ET tube was advanced into the right mainstem and turned on her left side to isolate the damage to the left. She required significant vasopressors despite continuing to receive significant volumes of transfusion. Her family decided to make her DNR and to not escalate care given her poor prognosis and wishes for a functional outcome from her hospitalization. Ultimately, she became hypotensive even on significant vasopressor support and became bradycardic then converted to asystole and was pronounced dead at 4:04am.

## 2019-12-03 NOTE — CHART NOTE - NSCHARTNOTEFT_GEN_A_CORE
Extensive conversation had with patient's  over the phone regarding patient's poor prognosis including that intervention, at this point, would likely hasten death. The family elected to make the patient DNR but continue other measures that would support recovery at this time.

## 2019-12-04 PROCEDURE — P9035: CPT

## 2019-12-04 PROCEDURE — 74241: CPT

## 2019-12-04 PROCEDURE — 84478 ASSAY OF TRIGLYCERIDES: CPT

## 2019-12-04 PROCEDURE — 97530 THERAPEUTIC ACTIVITIES: CPT

## 2019-12-04 PROCEDURE — 86901 BLOOD TYPING SEROLOGIC RH(D): CPT

## 2019-12-04 PROCEDURE — 84100 ASSAY OF PHOSPHORUS: CPT

## 2019-12-04 PROCEDURE — 80061 LIPID PANEL: CPT

## 2019-12-04 PROCEDURE — 74018 RADEX ABDOMEN 1 VIEW: CPT

## 2019-12-04 PROCEDURE — 36415 COLL VENOUS BLD VENIPUNCTURE: CPT

## 2019-12-04 PROCEDURE — 85652 RBC SED RATE AUTOMATED: CPT

## 2019-12-04 PROCEDURE — 93005 ELECTROCARDIOGRAM TRACING: CPT

## 2019-12-04 PROCEDURE — P9045: CPT

## 2019-12-04 PROCEDURE — 86923 COMPATIBILITY TEST ELECTRIC: CPT

## 2019-12-04 PROCEDURE — 71045 X-RAY EXAM CHEST 1 VIEW: CPT

## 2019-12-04 PROCEDURE — 97116 GAIT TRAINING THERAPY: CPT

## 2019-12-04 PROCEDURE — 85027 COMPLETE CBC AUTOMATED: CPT

## 2019-12-04 PROCEDURE — 81001 URINALYSIS AUTO W/SCOPE: CPT

## 2019-12-04 PROCEDURE — 85730 THROMBOPLASTIN TIME PARTIAL: CPT

## 2019-12-04 PROCEDURE — 83735 ASSAY OF MAGNESIUM: CPT

## 2019-12-04 PROCEDURE — 88304 TISSUE EXAM BY PATHOLOGIST: CPT

## 2019-12-04 PROCEDURE — 71046 X-RAY EXAM CHEST 2 VIEWS: CPT

## 2019-12-04 PROCEDURE — 88341 IMHCHEM/IMCYTCHM EA ADD ANTB: CPT

## 2019-12-04 PROCEDURE — 94002 VENT MGMT INPAT INIT DAY: CPT

## 2019-12-04 PROCEDURE — 83615 LACTATE (LD) (LDH) ENZYME: CPT

## 2019-12-04 PROCEDURE — P9016: CPT

## 2019-12-04 PROCEDURE — 86850 RBC ANTIBODY SCREEN: CPT

## 2019-12-04 PROCEDURE — C1889: CPT

## 2019-12-04 PROCEDURE — 86900 BLOOD TYPING SEROLOGIC ABO: CPT

## 2019-12-04 PROCEDURE — 88307 TISSUE EXAM BY PATHOLOGIST: CPT

## 2019-12-04 PROCEDURE — 80048 BASIC METABOLIC PNL TOTAL CA: CPT

## 2019-12-04 PROCEDURE — 82803 BLOOD GASES ANY COMBINATION: CPT

## 2019-12-04 PROCEDURE — 82330 ASSAY OF CALCIUM: CPT

## 2019-12-04 PROCEDURE — 83605 ASSAY OF LACTIC ACID: CPT

## 2019-12-04 PROCEDURE — 74174 CTA ABD&PLVS W/CONTRAST: CPT

## 2019-12-04 PROCEDURE — 87086 URINE CULTURE/COLONY COUNT: CPT

## 2019-12-04 PROCEDURE — 85045 AUTOMATED RETICULOCYTE COUNT: CPT

## 2019-12-04 PROCEDURE — 93970 EXTREMITY STUDY: CPT

## 2019-12-04 PROCEDURE — 82550 ASSAY OF CK (CPK): CPT

## 2019-12-04 PROCEDURE — 87040 BLOOD CULTURE FOR BACTERIA: CPT

## 2019-12-04 PROCEDURE — 94003 VENT MGMT INPAT SUBQ DAY: CPT

## 2019-12-04 PROCEDURE — 84484 ASSAY OF TROPONIN QUANT: CPT

## 2019-12-04 PROCEDURE — 87186 SC STD MICRODIL/AGAR DIL: CPT

## 2019-12-04 PROCEDURE — 82962 GLUCOSE BLOOD TEST: CPT

## 2019-12-04 PROCEDURE — 82553 CREATINE MB FRACTION: CPT

## 2019-12-04 PROCEDURE — 85384 FIBRINOGEN ACTIVITY: CPT

## 2019-12-04 PROCEDURE — 84295 ASSAY OF SERUM SODIUM: CPT

## 2019-12-04 PROCEDURE — 85610 PROTHROMBIN TIME: CPT

## 2019-12-04 PROCEDURE — 86140 C-REACTIVE PROTEIN: CPT

## 2019-12-04 PROCEDURE — 80053 COMPREHEN METABOLIC PANEL: CPT

## 2019-12-04 PROCEDURE — 97164 PT RE-EVAL EST PLAN CARE: CPT

## 2019-12-04 PROCEDURE — 36430 TRANSFUSION BLD/BLD COMPNT: CPT

## 2019-12-04 PROCEDURE — 85018 HEMOGLOBIN: CPT

## 2019-12-04 PROCEDURE — 84132 ASSAY OF SERUM POTASSIUM: CPT

## 2019-12-04 PROCEDURE — 71275 CT ANGIOGRAPHY CHEST: CPT

## 2019-12-04 PROCEDURE — 85025 COMPLETE CBC W/AUTO DIFF WBC: CPT

## 2019-12-04 PROCEDURE — P9017: CPT

## 2019-12-04 PROCEDURE — 97110 THERAPEUTIC EXERCISES: CPT

## 2019-12-04 PROCEDURE — P9012: CPT

## 2019-12-04 PROCEDURE — 83010 ASSAY OF HAPTOGLOBIN QUANT: CPT

## 2019-12-13 DIAGNOSIS — K92.2 GASTROINTESTINAL HEMORRHAGE, UNSPECIFIED: ICD-10-CM

## 2019-12-13 DIAGNOSIS — L89.159 PRESSURE ULCER OF SACRAL REGION, UNSPECIFIED STAGE: ICD-10-CM

## 2019-12-13 DIAGNOSIS — R04.2 HEMOPTYSIS: ICD-10-CM

## 2019-12-13 DIAGNOSIS — Z96.0 PRESENCE OF UROGENITAL IMPLANTS: ICD-10-CM

## 2019-12-13 DIAGNOSIS — I73.9 PERIPHERAL VASCULAR DISEASE, UNSPECIFIED: ICD-10-CM

## 2019-12-13 DIAGNOSIS — J96.01 ACUTE RESPIRATORY FAILURE WITH HYPOXIA: ICD-10-CM

## 2019-12-13 DIAGNOSIS — T83.511A INFECTION AND INFLAMMATORY REACTION DUE TO INDWELLING URETHRAL CATHETER, INITIAL ENCOUNTER: ICD-10-CM

## 2019-12-13 DIAGNOSIS — I47.1 SUPRAVENTRICULAR TACHYCARDIA: ICD-10-CM

## 2019-12-13 DIAGNOSIS — M19.90 UNSPECIFIED OSTEOARTHRITIS, UNSPECIFIED SITE: ICD-10-CM

## 2019-12-13 DIAGNOSIS — K29.70 GASTRITIS, UNSPECIFIED, WITHOUT BLEEDING: ICD-10-CM

## 2019-12-13 DIAGNOSIS — Z87.442 PERSONAL HISTORY OF URINARY CALCULI: ICD-10-CM

## 2019-12-13 DIAGNOSIS — M81.0 AGE-RELATED OSTEOPOROSIS WITHOUT CURRENT PATHOLOGICAL FRACTURE: ICD-10-CM

## 2019-12-13 DIAGNOSIS — H54.62 UNQUALIFIED VISUAL LOSS, LEFT EYE, NORMAL VISION RIGHT EYE: ICD-10-CM

## 2019-12-13 DIAGNOSIS — R00.0 TACHYCARDIA, UNSPECIFIED: ICD-10-CM

## 2019-12-13 DIAGNOSIS — F44.4 CONVERSION DISORDER WITH MOTOR SYMPTOM OR DEFICIT: ICD-10-CM

## 2019-12-13 DIAGNOSIS — R57.1 HYPOVOLEMIC SHOCK: ICD-10-CM

## 2019-12-13 DIAGNOSIS — N39.0 URINARY TRACT INFECTION, SITE NOT SPECIFIED: ICD-10-CM

## 2019-12-13 DIAGNOSIS — G93.41 METABOLIC ENCEPHALOPATHY: ICD-10-CM

## 2019-12-13 DIAGNOSIS — D62 ACUTE POSTHEMORRHAGIC ANEMIA: ICD-10-CM

## 2019-12-13 DIAGNOSIS — E44.0 MODERATE PROTEIN-CALORIE MALNUTRITION: ICD-10-CM

## 2019-12-13 DIAGNOSIS — Z66 DO NOT RESUSCITATE: ICD-10-CM
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
## 2019-12-13 DIAGNOSIS — R57.8 OTHER SHOCK: ICD-10-CM

## 2019-12-13 DIAGNOSIS — Z94.7 CORNEAL TRANSPLANT STATUS: ICD-10-CM

## 2019-12-13 DIAGNOSIS — R00.1 BRADYCARDIA, UNSPECIFIED: ICD-10-CM

## 2019-12-13 DIAGNOSIS — Z87.440 PERSONAL HISTORY OF URINARY (TRACT) INFECTIONS: ICD-10-CM

## 2019-12-13 DIAGNOSIS — Z86.73 PERSONAL HISTORY OF TRANSIENT ISCHEMIC ATTACK (TIA), AND CEREBRAL INFARCTION WITHOUT RESIDUAL DEFICITS: ICD-10-CM

## 2019-12-18 PROCEDURE — 82150 ASSAY OF AMYLASE: CPT

## 2019-12-18 PROCEDURE — C1769: CPT

## 2019-12-18 PROCEDURE — 84100 ASSAY OF PHOSPHORUS: CPT

## 2019-12-18 PROCEDURE — C1887: CPT

## 2019-12-18 PROCEDURE — 81001 URINALYSIS AUTO W/SCOPE: CPT

## 2019-12-18 PROCEDURE — 85652 RBC SED RATE AUTOMATED: CPT

## 2019-12-18 PROCEDURE — C1894: CPT

## 2019-12-18 PROCEDURE — 85027 COMPLETE CBC AUTOMATED: CPT

## 2019-12-18 PROCEDURE — 86850 RBC ANTIBODY SCREEN: CPT

## 2019-12-18 PROCEDURE — 84443 ASSAY THYROID STIM HORMONE: CPT

## 2019-12-18 PROCEDURE — 83880 ASSAY OF NATRIURETIC PEPTIDE: CPT

## 2019-12-18 PROCEDURE — 84484 ASSAY OF TROPONIN QUANT: CPT

## 2019-12-18 PROCEDURE — 36415 COLL VENOUS BLD VENIPUNCTURE: CPT

## 2019-12-18 PROCEDURE — 80048 BASIC METABOLIC PNL TOTAL CA: CPT

## 2019-12-18 PROCEDURE — 83690 ASSAY OF LIPASE: CPT

## 2019-12-18 PROCEDURE — 84436 ASSAY OF TOTAL THYROXINE: CPT

## 2019-12-18 PROCEDURE — 71045 X-RAY EXAM CHEST 1 VIEW: CPT

## 2019-12-18 PROCEDURE — 86900 BLOOD TYPING SEROLOGIC ABO: CPT

## 2019-12-18 PROCEDURE — 83036 HEMOGLOBIN GLYCOSYLATED A1C: CPT

## 2019-12-18 PROCEDURE — 82962 GLUCOSE BLOOD TEST: CPT

## 2019-12-18 PROCEDURE — 86901 BLOOD TYPING SEROLOGIC RH(D): CPT

## 2019-12-18 PROCEDURE — 85025 COMPLETE CBC W/AUTO DIFF WBC: CPT

## 2019-12-18 PROCEDURE — 86140 C-REACTIVE PROTEIN: CPT

## 2019-12-18 PROCEDURE — 80053 COMPREHEN METABOLIC PANEL: CPT

## 2019-12-18 PROCEDURE — C1889: CPT

## 2019-12-18 PROCEDURE — 80061 LIPID PANEL: CPT

## 2019-12-18 PROCEDURE — 83735 ASSAY OF MAGNESIUM: CPT

## 2019-12-18 PROCEDURE — 85730 THROMBOPLASTIN TIME PARTIAL: CPT

## 2020-01-14 NOTE — PHYSICAL THERAPY INITIAL EVALUATION ADULT - ORIENTATION, REHAB EVAL
----- Message from Dimitri Hung DO sent at 1/14/2020  8:15 AM CST -----  Please remind patient he needs to get a colonoscopy because of the blood in his stool sample.    Q   oriented to person, place, time and situation

## 2020-01-22 NOTE — ED PROVIDER NOTE - PROGRESS NOTE ADDITIONAL1
Health Maintenance Due   Topic Date Due   â¢ DTaP/Tdap/Td Vaccine (5 - Tdap) 07/03/1995   â¢ Influenza Vaccine (1) 09/01/2019       Patient is due for topics as listed above but is not proceeding with Immunization(s) Dtap/Tdap/Td and Influenza at this time.  Patient refused today; here for acute visit Additional Progress Note...

## 2020-03-10 NOTE — H&P ADULT - NSICDXPASTMEDICALHX_GEN_ALL_CORE_FT
[General Appearance - Well Developed] : well developed [General Appearance - Well Nourished] : well nourished [Normal Appearance] : normal appearance PAST MEDICAL HISTORY:  Anemia chronic    Aortic dissection, thoracic Type A Repaired 2009    Blindness of left eye hx corneal transplant 2018  Aug. 2018    Dieulafoy lesion of stomach or duodenum 10/1/2019    Dorsalgia of lumbar region on pain medication /baclofen po and pump    Gastrointestinal hemorrhage 9/28/2019, 9/4/2019, 8/29/2019    Hematoma spinal treated September 2018    HTN (Hypertension)     Melena 10/7/2019    Osteoporosis     PAD (peripheral artery disease)     Paraplegia due to spinal hematoma, on wheelchair goes to physical therapy 2 x weekly    Self-catheterizes urinary bladder     Subdural hematoma, nontraumatic spontaneous    TIA (transient ischemic attack)     UTI (urinary tract infection) recurrent    Uveitis [Well Groomed] : well groomed [General Appearance - In No Acute Distress] : no acute distress [Edema] : no peripheral edema [Respiration, Rhythm And Depth] : normal respiratory rhythm and effort [Exaggerated Use Of Accessory Muscles For Inspiration] : no accessory muscle use [Abdomen Soft] : soft [Abdomen Tenderness] : non-tender [Costovertebral Angle Tenderness] : no ~M costovertebral angle tenderness [Urinary Bladder Findings] : the bladder was normal on palpation [Normal Station and Gait] : the gait and station were normal for the patient's age [] : no rash [No Focal Deficits] : no focal deficits [Oriented To Time, Place, And Person] : oriented to person, place, and time [Affect] : the affect was normal [Mood] : the mood was normal [Not Anxious] : not anxious

## 2020-03-26 NOTE — PROGRESS NOTE ADULT - REASON FOR ADMISSION
71f with intermittent dizziness, seen by PCP and now UC (ecg, labs normal) concerned about TIA     Jose Luis Gallagher, DO  03/25/20 1542  
PT at bedside.  
Writer agrees with Mike keyes.  
Hypotension

## 2020-06-30 NOTE — H&P PST ADULT - ADMIT DATE
Progress Note - Electrophysiology-Cardiology (EP)   Cleopatra Brewer 81 y o  male MRN: [id number]  Unit/Bed#: CW2 215-02 Encounter: [id number]      Assessment/Plan:   Primary diagnosis:   1  CHB    * patient presented to [facility] on 6- for DC PPM due to outpatient high degree AVB being in CHB on admission  * s/p DC MDT PPM by Dr Katia Ellis on 6-   * today his site is soft without any ecchymosis or hematoma    * device was interrogated and found to be in appropriate function    * CXRs reviewed without any PTX with proper lead placement    * we discussed proper post site care to which the patient understood, he was also given written instructions    * pt will f/u in our device clinic in 2 weeks time for device check, this appointment was [facility] in [facility]   * currently atrially pacing     2  MARION on CKD    * likely from bradycardia    * appreciate Nephrology input    * Cr greatly improved today to 1 89 from 2 79   * will continue BMP and I/O monitoring     3  Acute on chronic systolic CHF exacerbation, NYHA II, AHA class C    * known HFrEF from outpatient cardiologist notes but no echo on file    * CXR showing vascular congestion today, signs of rales and hypoxia    * lasix held on admit due to MARION    * will give lasix 40mg IV  X 1 this AM and re assess on 7-1-2020, do not want to deplete too quickly    * continue daily weights and I/O monitoring     4  Leukocytosis    * present on admit at 16    * s/p 2grams ancef pre PPM    * WBC now close to normal limits    * UA was positive but patient without any urinary symptoms, will follow up on urine culture    * will continue to observe     Secondary diagnosis:   1  CKD  2  HTN  3  ICMP  4  HLD    EKG:   No post procedure ECG     Imaging: I have personally reviewed pertinent reports  No results found for this or any previous visit  Subjective/Objective   Subjective: Today patient seen by EP post PPM     Denies any pain at pacemaker site   Does have ABEL with limited ambulation  Needed 4L NC today due to hypoxa       Vitals: /73   Pulse 85   Temp 97 5 °F (36 4 °C)   Resp 18   Ht 5' 5" (1 651 m)   Wt 65 8 kg (145 lb)   SpO2 90%   BMI 24 13 kg/m²   Vitals:    06/29/20 1150   Weight: 65 8 kg (145 lb)     Orthostatic Blood Pressures      Most Recent Value   Blood Pressure  103/73 filed at 06/30/2020 1148   Patient Position - Orthostatic VS  Lying filed at 06/29/2020 1459            Intake/Output Summary (Last 24 hours) at 6/30/2020 1306  Last data filed at 6/30/2020 1148  Gross per 24 hour   Intake 1260 ml   Output 581 ml   Net 679 ml       Invasive Devices     Peripheral Intravenous Line            Peripheral IV 06/29/20 Left Forearm 1 day                            Scheduled Meds:  Current Facility-Administered Medications:  acetaminophen 650 mg Oral Q4H PRN Julius Friend, ARABELLA   aspirin 81 mg Oral Daily Julius Friend, ARABELLA   atorvastatin 20 mg Oral Daily With [employer] FriendARABELLA   cefazolin 2,000 mg Intravenous Once Julius Friend, ARABELLA   docusate sodium 100 mg Oral BID PRN Laurie Kim Friend, ARABELLA   insulin glargine 8 Units Subcutaneous HS Julius Friend, ARABELLA   melatonin 3 mg Oral HS PRN Laurie Carreon, ARABELLA   pantoprazole 40 mg Oral Daily Julius Friend, ARABELLA   repaglinide 1 mg Oral BID AC Julius Friend, ARABELLA   tamsulosin 0 4 mg Oral Daily With [employer] Friend, ARABELLA     Continuous Infusions:   PRN Meds:   acetaminophen    docusate sodium    melatonin    Physical Exam:   GEN: NAD, alert and oriented, well appearing  SKIN: dry without significant lesions or rashes  HEENT: NCAT, PERRL, EOMs intact  NECK: No JVD or carotid bruits appreciated  CARDIOVASCULAR: RRR, normal S1, S2 without murmurs, rubs, or gallops appreciated  LUNGS: rales at bases b/l   ABDOMEN: Soft, nontender, nondistended  EXTREMITIES/VASCULAR: perfused without clubbing, cyanosis, or edema b/l  PSYCH: Normal mood and affect  NEURO: CN ll-Xll grossly intact                Lab Results: I have personally 30-May-2018 reviewed pertinent lab results      Results from last 7 days   Lab Units 06/30/20  0551 06/29/20  1210   WBC Thousand/uL 10 48* 16 18*   HEMOGLOBIN g/dL 11 0* 11 3*   HEMATOCRIT % 36 0* 36 9   PLATELETS Thousands/uL 170 218     Results from last 7 days   Lab Units 06/30/20  0551 06/29/20  1210   POTASSIUM mmol/L 4 0 4 8   CHLORIDE mmol/L 107 107   CO2 mmol/L 24 20*   BUN mg/dL 41* 49*   CREATININE mg/dL 1 86* 2 79*   CALCIUM mg/dL 8 0* 8 8

## 2020-07-28 NOTE — ED PROVIDER NOTE - RESPIRATORY, MLM
Addended by: ADILIA RO on: 7/28/2020 02:49 PM     Modules accepted: Orders     Breath sounds clear and equal bilaterally.

## 2020-07-30 NOTE — DIETITIAN INITIAL EVALUATION ADULT. - +GENDER
Patient was unable to answer.  Voicemail left with call back number to call us back at earliest convenience.       Statement Selected

## 2020-09-05 NOTE — BRIEF OPERATIVE NOTE - PRE-OP
ICU Transfer Note    Floor Nurse Name: Angelica Chaney    Room #: 4310    Family Member updated: Nisha Box and Mary Box    The patient was transported by transport team via wheelchair. Vital signs WNL at time of transfer.Patient belongings transferred with patient see transfer navigator for details. All consults and family made aware of new room number. Bedside medications sent to new room number.            <<-----Click on this checkbox to enter Pre-Op Dx

## 2021-01-01 NOTE — PHYSICAL THERAPY INITIAL EVALUATION ADULT - GENERAL OBSERVATIONS, REHAB EVAL
Received semi-Gustafson's position in bed with +tele, +pulse ox, on room air, +SCDs, +bed alarm, +RIJ, +Primafit, in no apparent distress. Patient appears to be resting comfortably in bed Yes...

## 2021-01-14 NOTE — PROGRESS NOTE ADULT - PROBLEM SELECTOR PLAN 2
- CIC q4-6 hours for neurogenic bladder  - Laser lithotripsy 2/2 poor outpt follow up sometime this week  -  following, recs appreciated Staging Info: By selecting yes to the question above you will include information on AJCC 8 tumor staging in your Mohs note. Information on tumor staging will be automatically added for SCCs on the head and neck. AJCC 8 includes tumor size, tumor depth, perineural involvement and bone invasion.

## 2021-02-05 NOTE — DISCHARGE NOTE PROVIDER - NSDCDCMDCOMP_GEN_ALL_CORE
Immunization chart prep completed.  Immunization records verified.  Kylee Joyce due for Influenza   This document is complete and the patient is ready for discharge.

## 2021-02-05 NOTE — PATIENT PROFILE ADULT. - NSSUBSTANCEUSE_GEN_ALL_CORE_SD
never used Information: Selecting Yes will display possible errors in your note based on the variables you have selected. This validation is only offered as a suggestion for you. PLEASE NOTE THAT THE VALIDATION TEXT WILL BE REMOVED WHEN YOU FINALIZE YOUR NOTE. IF YOU WANT TO FAX A PRELIMINARY NOTE YOU WILL NEED TO TOGGLE THIS TO 'NO' IF YOU DO NOT WANT IT IN YOUR FAXED NOTE.

## 2021-02-20 NOTE — CHART NOTE - NSCHARTNOTEFT_GEN_A_CORE
istop Reference #: [id number]    Rx Written	Rx Dispensed	Drug	Quantity	Days Supply	Prescriber Name  08/23/2019	08/23/2019	morphine sulfate powder *	0gm	30	Arriaga, Tram PA  06/03/2019	06/03/2019	morphine sulfate powder *	0gm	30	Arriaga, Tram PA  04/11/2019	04/11/2019	morphine sulfate powder *	0gm	30	Arriaga, Tram PA    Rx Written	Rx Dispensed	Drug	Quantity	Days Supply	Prescriber Name  07/29/2019	08/03/2019	oxycodone hcl 10 mg tablet	60	30	Arriaga, Tram PA  07/15/2019	07/15/2019	zolpidem tartrate 5 mg tablet	30	30	Arriaga, Tram PA  07/01/2019	07/02/2019	oxycodone hcl 10 mg tablet	60	30	Arriaga, Tram PA  06/13/2019	06/14/2019	zolpidem tartrate 5 mg tablet	30	30	Arriaga, Tram PA  05/21/2019	05/24/2019	oxycodone hcl 10 mg tablet	60	30	Arriaga, Tram PA  05/07/2019	05/09/2019	zolpidem tartrate 5 mg tablet	30	30	Arriaga, Tram PA  04/25/2019	05/04/2019	oxycodone hcl 10 mg tablet	60	15	Arriaga, Tram PA  04/09/2019	04/10/2019	zolpidem tartrate 5 mg tablet	30	30	Victorino Nair DO  04/10/2019	04/10/2019	oxycodone hcl 10 mg tablet	60	15	Arriaga, Tram PA  02/26/2019	02/26/2019	zolpidem tartrate 5 mg tablet	30	30	Belinda Mckenzie)  02/15/2019	02/17/2019	lyrica 100 mg capsule	60	30	Victorino Arias MD  02/15/2019	02/17/2019	oxycontin er 10 mg tablet	14	7	Victornio Arias MD  02/13/2019	02/14/2019	oxycodone hcl 10 mg tablet	180	30	Belinda Mckenzie)  01/18/2019	01/23/2019	zolpidem tartrate 5 mg tablet	30	30	Belinda Mckenzie)  12/24/2018	12/24/2018	zolpidem tartrate 5 mg tablet	30	30	Belinda Mckenzie)  12/11/2018	12/20/2018	oxycodone hcl 5 mg tablet	120	30	Belinda Mckenzie)  12/12/2018	12/13/2018	oxycodone hcl 10 mg tablet	120	30	Malena Jalloh (NP)  12/04/2018	12/04/2018	methadone hcl 5 mg tablet	60	30	Belinda Mckenzie)  11/14/2018	11/19/2018	zolpidem tartrate 5 mg tablet	30	30	Belinda Mckenzie)  11/06/2018	11/12/2018	diazepam 5 mg tablet	120	30	Belinda Mckenzie)  11/06/2018	11/12/2018	oxycodone hcl 5 mg tablet	120	20	Belinda Mckenzie)  10/17/2018	10/18/2018	zolpidem tartrate 5 mg tablet	30	30	Belinda Mckenzie)  10/17/2018	10/17/2018	oxycodone hcl 5 mg tablet	100	30	Victorino Nair DO  10/04/2018	10/08/2018	methadone hcl 5 mg tablet	60	30	Belinda Mckenzie)  09/26/2018	10/02/2018	nucynta er 100 mg tablet	60	30	Belinda Mckenzie)    Rx Written	Rx Dispensed	Drug	Quantity	Days Supply	Prescriber Name  12/12/2018	12/14/2018	levorphanol 2 mg tablet	90	30	Malena Jalloh (ANEUDY) Implemented All Universal Safety Interventions:  [place] to call system. Call bell, personal items and telephone within reach. Instruct patient to call for assistance. Room bathroom lighting operational. Non-slip footwear when patient is off stretcher. Physically safe environment: no spills, clutter or unnecessary equipment. Stretcher in lowest position, wheels locked, appropriate side rails in place.

## 2021-03-02 NOTE — ED PROVIDER NOTE - SHIFT CHANGE DETAILS
Attending MD Rosario: 66F with AMS, found to have UTI.  Seen here by neuro, cleared for outaptient follow up.  Pending CXR.  Discharge with Cefpodoxime and outpatient neuro follow up for age indeterminate ?infarct.
0

## 2021-03-22 NOTE — H&P ADULT - NSHPPOADEEPVENOUSTHROMB_GEN_A_CORE
OB CHECK  3/22/2021     Chief Complaint   Patient presents with   • Routine Prenatal Visit        HPI:  Jhon Hsu is a 20 year old,  female at 10w3d by LMP and ultrasound (10w0d).  PIPER of 10/15/2021. She presents today for routine OB Check.     Father of baby is Miguel, 20 years old and is the father of her daughter.   Lives with Miguel and her daughter Juan.   Patient is currently not employed.     She denies bleeding or cramping.     She is interested in genetic screening.     Physical Exam:  Vitals:    21 1045   BP: 114/68   Pulse: 91     Assessment/Plan:  Jhon Hsu is a 20 year old,  female at 10w3d with an PIPER of 10/15/2021 who is here for routine OB visit.     -Patient left prior to getting her prenatal labs drawn today, draw at next visit  -Order placed for genetic counseling, carrier and aneuploidy screening. Patient reminded to schedule   -OB warnings reviewed  -RTC in 4 weeks     Francia Salomon CNM    
no

## 2021-03-30 NOTE — ED ADULT NURSE NOTE - CAS EDP DISCH DISPOSITION ADMI
Anesthesia Volume In Cc: 0.1 Billing Type: Client Bill Was A Bandage Applied: Yes X Size Of Lesion In Cm (Optional): 0 Size Of Lesion In Cm (Required): 0.5 SSD Hemostasis: Drysol Add Variable For Additional Medical Justification: No Anesthesia Type: 2% lidocaine with epinephrine Wound Care: Petrolatum Detail Level: Detailed Notification Instructions: Patient will be notified of pathology results. However, patient instructed to call the office if not contacted within 2 weeks. Consent was obtained from the patient. The risks and benefits to therapy were discussed in detail. Specifically, the risks of infection, scarring, bleeding, prolonged wound healing, incomplete removal, allergy to anesthesia, nerve injury and recurrence were addressed. Prior to the procedure, the treatment site was clearly identified and confirmed by the patient. All components of Universal Protocol/PAUSE Rule completed. Medical Necessity Information: It is in your best interest to select a reason for this procedure from the list below. All of these items fulfill various CMS LCD requirements except the new and changing color options. Medical Necessity Clause: This procedure was medically necessary because the lesion that was treated was: Post-Care Instructions: I reviewed with the patient in detail post-care instructions. Patient is to keep the biopsy site dry overnight, and then apply bacitracin twice daily until healed. Patient may apply hydrogen peroxide soaks to remove any crusting. Path Notes (To The Dermatopathologist): Please check margins. Biopsy Method: Dermablade

## 2021-04-21 NOTE — ED ADULT NURSE NOTE - NSFALLRSKASSISTTYPE_ED_ALL_ED
PATIENT WAS CALLED AND INFORMED MRI WAS APPROVED AND INSTRUCTIONS WERE GIVEN ON HOW TO SCHEDULE.
Standing/Walking/Toileting

## 2021-04-21 NOTE — CHART NOTE - NSCHARTNOTESELECT_GEN_ALL_CORE
Event Note
malnutrition note
Detail Level: Detailed
General Sunscreen Counseling: I recommended a broad spectrum sunscreen with a SPF of 30 or higher.  I explained that SPF 30 sunscreens block approximately 97 percent of the sun's harmful rays.  Sunscreens should be applied at least 15 minutes prior to expected sun exposure and then every 2 hours after that as long as sun exposure continues. If swimming or exercising sunscreen should be reapplied every 45 minutes to an hour after getting wet or sweating.  One ounce, or the equivalent of a shot glass full of sunscreen, is adequate to protect the skin not covered by a bathing suit. I also recommended a lip balm with a sunscreen as well. Sun protective clothing can be used in lieu of sunscreen but must be worn the entire time you are exposed to the sun's rays.

## 2021-05-05 NOTE — OCCUPATIONAL THERAPY INITIAL EVALUATION ADULT - BATHING, PREVIOUS LEVEL OF FUNCTION, OT EVAL
What Type Of Note Output Would You Prefer (Optional)?: Standard Output Hpi Title: Evaluation of Skin Lesions needed assist

## 2021-05-13 NOTE — ED PROVIDER NOTE - NS_EDPROVIDERDISPOUSERTYPE_ED_A_ED
Scribe Attestation (For Scribes USE Only)... Attending Attestation (For Attendings USE Only).../Scribe Attestation (For Scribes USE Only)... Melolabial Interpolation Flap Text: A decision was made to reconstruct the defect utilizing an interpolation axial flap and a staged reconstruction.  A telfa template was made of the defect.  This telfa template was then used to outline the melolabial interpolation flap.  The donor area for the pedicle flap was then injected with anesthesia.  The flap was excised through the skin and subcutaneous tissue down to the layer of the underlying musculature.  The pedicle flap was carefully excised within this deep plane to maintain its blood supply.  The edges of the donor site were undermined.   The donor site was closed in a primary fashion.  The pedicle was then rotated into position and sutured.  Once the tube was sutured into place, adequate blood supply was confirmed with blanching and refill.  The pedicle was then wrapped with xeroform gauze and dressed appropriately with a telfa and gauze bandage to ensure continued blood supply and protect the attached pedicle.

## 2021-06-24 NOTE — ED ADULT NURSE NOTE - CCCP TRG CHIEF CMPLNT
Patient received colon recall from Dr. Díaz.  He wants to wait to schedule and will call us when he is ready.  
rectal bleeding

## 2021-07-16 NOTE — DIETITIAN INITIAL EVALUATION ADULT. - PERTINENT MEDS FT
MEDICATIONS  (STANDING):  artificial  tears Solution 1 Drop(s) Both EYES every 4 hours  ascorbic acid 500 milliGRAM(s) Oral daily  atorvastatin 40 milliGRAM(s) Oral at bedtime  baclofen 20 milliGRAM(s) Oral two times a day  docusate sodium 100 milliGRAM(s) Oral three times a day  DULoxetine 30 milliGRAM(s) Oral two times a day  gabapentin 800 milliGRAM(s) Oral three times a day  labetalol 200 milliGRAM(s) Oral two times a day  lidocaine   Patch 1 Patch Transdermal daily  multivitamin 1 Tablet(s) Oral daily  ofloxacin 0.3% Solution 1 Drop(s) Left EYE four times a day  pantoprazole  Injectable 40 milliGRAM(s) IV Push two times a day  piperacillin/tazobactam IVPB.. 3.375 Gram(s) IV Intermittent every 8 hours  polyethylene glycol 3350 17 Gram(s) Oral daily  prednisoLONE acetate 1% Suspension 1 Drop(s) Left EYE four times a day  valACYclovir 1000 milliGRAM(s) Oral three times a day    MEDICATIONS  (PRN):  acetaminophen   Tablet .. 650 milliGRAM(s) Oral every 6 hours PRN Mild Pain (1 - 3)  oxyCODONE    IR 10 milliGRAM(s) Oral three times a day PRN Moderate and Severe Pain  sodium chloride 2% Ophthalmic Solution 1 Drop(s) Left EYE four times a day PRN Eye pain  zolpidem 5 milliGRAM(s) Oral at bedtime PRN Insomnia Opioid Counseling: I discussed with the patient the potential side effects of opioids including but not limited to addiction, altered mental status, and depression. I stressed avoiding alcohol, benzodiazepines, muscle relaxants and sleep aids unless specifically okayed by a physician. The patient verbalized understanding of the proper use and possible adverse effects of opioids. All of the patient's questions and concerns were addressed. They were instructed to flush the remaining pills down the toilet if they did not need them for pain.

## 2021-11-02 NOTE — ASU DISCHARGE PLAN (ADULT/PEDIATRIC). - BRING EYE KIT TO OFFICE EACH TIME YOU COME FOR A VISIT
[facility]  *Pt sent message that her pharmacy told her it needed a PA.    Mary    Statement Selected

## 2021-11-05 NOTE — PROGRESS NOTE ADULT - PROBLEM/PLAN-1
Quality 431: Preventive Care And Screening: Unhealthy Alcohol Use - Screening: Patient screened for unhealthy alcohol use using a single question and scores less than 2 times per year
Quality 226: Preventive Care And Screening: Tobacco Use: Screening And Cessation Intervention: Patient screened for tobacco use and is an ex/non-smoker
DISPLAY PLAN FREE TEXT
Quality 130: Documentation Of Current Medications In The Medical Record: Current Medications Documented
DISPLAY PLAN FREE TEXT
Detail Level: Detailed
DISPLAY PLAN FREE TEXT

## 2022-01-01 NOTE — PROGRESS NOTE ADULT - PROBLEM SELECTOR PROBLEM 7
Prophylactic measure I will START or STAY ON the medications listed below when I get home from the hospital:  None

## 2022-01-03 NOTE — PROGRESS NOTE ADULT - PROBLEM SELECTOR PLAN 7
Reason for Disposition  • Caller has already spoken with another triager and has no further questions    Protocols used: NO CONTACT OR DUPLICATE CONTACT CALL-P-AH     - DVT ppx: SCDs in setting of GI bleeding  - Diet: Soft mechanical diet  - Dispo: pending clinical improvement

## 2022-01-14 NOTE — DISCHARGE NOTE PROVIDER - CARE PROVIDER_API CALL
Flory Dixon)  Urology  [address] [place] [address]  Phone: [phone]  Fax: [phone]  Established Patient  Follow Up Time: 2 weeks    Doenll Ambrocio)  Gastroenterology  [address] [place] [address], [address]  [place] [address]  Phone: [phone]  Fax: [phone]  Established Patient  Follow Up Time: Routine [Feeling Poorly] : feeling poorly [Negative] : Constitutional

## 2022-01-31 NOTE — PROGRESS NOTE ADULT - PROBLEM SELECTOR PROBLEM 9
Physical Therapy     Referred by: Regan Szymanski DO; Medical Diagnosis (from order):    Diagnosis Information      Diagnosis    724.4 (ICD-9-CM) - M54.16 (ICD-10-CM) - Lumbar radiculopathy                Daily Treatment Note    Visit:  2   Diagnosis Precautions: Past Medical History:  05/01/2018: Acquired pes planovalgus  No date: Amblyopia  05/01/2018: Arthritis of midfoot  03/24/2015: Basal cell carcinoma of neck      Comment:  Excised 3/24/2015 - very close margins   No date: BPH (benign prostatic hypertrophy)  07/01/2019: Chronic diastolic CHF (congestive heart failure) (CMS/[facility])  No date: Chronic sinusitis  No date: Colon polyps  09/24/2021: Coronary artery disease involving native coronary artery   of native heart without angina pectoris  No date: Depression  02/19/2015: Diaphragmatic hernia without mention of obstruction or   gangrene  02/19/2015: Diverticulosis of colon (without mention of hemorrhage)  11/21/2011: Essential hypertension  No date: Gastroesophageal reflux disease  No date: Hepatitis cholestatic  07/2003: Hodgkin's lymphoma (CMS/[facility])      Comment:  Dr. Hernandez  02/04/2013: Insomnia, unspecified      Comment:  psychophysiologic, chronic use of hypnotic medication.   9/24/2021: Lumbar radiculopathy  04/06/2017: Myopia of both eyes with astigmatism and presbyopia  01/28/2016: Non-rheumatic mitral regurgitation      Comment:  1/19/2016 ECHO: Moderate mitral valve regurgitation   02/04/2013: Obstructive sleep apnea (adult) (pediatric)      Comment:  Wears cpap  No date: Osteoarthritis      Comment:  right knee- scheduled for replacement soon - shoulder,                neck  07/01/2019: Paroxysmal atrial fibrillation (CMS/[facility])  No date: Peripheral edema      Comment:  bilateral   No date: PMR (polymyalgia rheumatica) (CMS/[facility])      Comment:  [facility]ed [facility] symptoms c/w  2/13/2017  No date: Pneumonia  No date: PONV (postoperative nausea and vomiting)  10/08/2014: Port catheter in 
place  10/02/2017: PVD (posterior vitreous detachment), both eyes  05/27/2021: S/P CABG x 1  05/27/2021: S/P left atrial appendage ligation  05/27/2021: S/P modified Maze operation for atrial fibrillation  2003: Status post chemotherapy  No date: Urinary incontinence    SUBJECTIVE                                                                                                               Still with low back pain, but feels the exercises are helpful in decreasing his stiffness  Functional Change: None to report  Pain / Symptoms:  Pain rating (out of 10): Current: 2   Location: Low back    OBJECTIVE                                                                                                                     Observation:   Cervical: forward head  Shoulder: rounded  Spine: increased thoracic kyphosis  Comments / Details: Comes to PT with SPC with decreased knee flexion and extension during gait right > left        Vitals:  141/82 right arm, HR 94  TREATMENT                                                                                                                  Therapeutic Exercise:  Full review of HEP   LTR with cues for technique 10 reps  Then over theraball, LTR 10 reps  Over theraball, bridging 15reps  Over theraball, heel digs with bilateral knee flexion 15reps  SKC 10sec hold x 3  DKC 10sec hold x 3  Supine Hip abduction AROM 10reps ea  Manual hamstring stretch 30sec hold x 3 ea  Gait 3 laps total with cues for increased knee flexion and increased glut and quad activation with heel strike/step      Skilled input: verbal instruction/cues, tactile instruction/cues and posture correction    Home Exercise Program/Education Materials: Date: 01/20/2022  Prepared by: Jake Jo     Exercises  ·           Supine Lower Trunk Rotation - 1 x daily - 7 x weekly - 3 sets - 10 reps  ·           Supine Single Knee to Chest Stretch - 1 x daily - 7 x weekly - 3 sets - 10 reps  ·           Supine Hip Abduction - 
1 x daily - 7 x weekly - 3 sets - 10 reps          ASSESSMENT                                                                                                             Tolerated activities well with little to no pain complaint this date during all activities. Presents with significant restrictions bilateral knee flexibility post TKA bilateral which result in abnormal gait pattern.  Further skilled therapy services are indicated to regain painfree function    Patient Education:   Results of above outlined education: Verbalizes understanding and Needs reinforcement      PLAN                                                                                                                           Suggestions for next session as indicated: Progress per plan of care  Progressive flexibility and strengthening program          Therapy procedure time and total treatment time can be found documented on the Time Entry flowsheet  
Prophylactic measure
Malnutrition

## 2022-02-24 NOTE — PATIENT PROFILE ADULT - NSPROREFERSVCHOMECARDIO_GEN_A_NUR
no Double Island Pedicle Flap Text: The defect edges were debeveled with a #15 scalpel blade.  Given the location of the defect, shape of the defect and the proximity to free margins a double island pedicle advancement flap was deemed most appropriate.  Using a sterile surgical marker, an appropriate advancement flap was drawn incorporating the defect, outlining the appropriate donor tissue and placing the expected incisions within the relaxed skin tension lines where possible.    The area thus outlined was incised deep to adipose tissue with a #15 scalpel blade.  The skin margins were undermined to an appropriate distance in all directions around the primary defect and laterally outward around the island pedicle utilizing iris scissors.  There was minimal undermining beneath the pedicle flap.

## 2022-02-28 NOTE — PATIENT PROFILE ADULT - JOB HELP
Detail Level: Simple Detail Level: Detailed Quality 137: Melanoma: Continuity Of Care - Recall System: Patient information entered into a recall system that includes: target date for the next exam specified AND a process to follow up with patients regarding missed or unscheduled appointments When Should The Patient Follow-Up For Their Next Full-Body Skin Exam?: 6 Months no

## 2022-03-14 NOTE — ED ADULT NURSE NOTE - NS ED NURSE DISCH DISPOSITION
PIV removed. AVS reviewed with bedside nurse. Pt changed into own clothes. Wife at bedside. Wheelchair transported requested.    Admitted

## 2022-03-16 NOTE — PATIENT PROFILE ADULT - CENTRAL VENOUS CATHETER/PICC LINE
Patient: Adan Tan    Procedure: Procedure(s):  VIDEO-ASSISTED THORACOSCOPIC SURGERY (VATS) EVACUATION OF HEMOTHORAX       Diagnosis: Hemothorax on left [J94.2]  Diagnosis Additional Information: No value filed.    Anesthesia Type:   General     Note:    Oropharynx: oropharynx clear of all foreign objects  Level of Consciousness: awake and drowsy  Oxygen Supplementation: face mask  Level of Supplemental Oxygen (L/min / FiO2): 8  Independent Airway: airway patency satisfactory and stable  Dentition: dentition unchanged  Vital Signs Stable: post-procedure vital signs reviewed and stable  Report to RN Given: handoff report given  Patient transferred to: PACU    Handoff Report: Identifed the Patient, Identified the Reponsible Provider, Reviewed the pertinent medical history, Discussed the surgical course, Reviewed Intra-OP anesthesia mangement and issues during anesthesia, Set expectations for post-procedure period and Allowed opportunity for questions and acknowledgement of understanding      Vitals:  Vitals Value Taken Time   /84 03/16/22 1515   Temp 98.1 1515   Pulse 100 03/16/22 1515   Resp 34 03/16/22 1515   SpO2 94 % 03/16/22 1515   Vitals shown include unvalidated device data.    Electronically Signed By: SANTO Morel CRNA  March 16, 2022  3:17 PM  
no

## 2022-05-06 NOTE — PHYSICAL THERAPY INITIAL EVALUATION ADULT - ASSISTIVE DEVICE, REHAB EVAL
bed rails Airway patent, TM normal bilaterally, normal appearing mouth, nose, throat, neck supple with full range of motion, no cervical adenopathy.

## 2022-05-20 NOTE — PHYSICAL THERAPY INITIAL EVALUATION ADULT - IMPAIRMENTS FOUND, PT EVAL
This is a historical note converted from [employer]. Formatting and pictures may have been removed.  Please reference Eduardo for original formatting and attached multimedia. Procedure Name  Bilateral L4-5 and L5-S1 Facet Injections  ?   Pre-Procedure Diagnoses:  1. Chronic pain syndrome  2. Low back pain  3. Lumbar facet arthropathy  ?   Post-Procedure Diagnoses:  1. Chronic pain syndrome  2. Low back pain  3. Lumbar facet arthropathy  ?   Anesthesia:  Local and MAC  ?  Estimated Blood Loss:  None  ?  Complications:  None  ?  Informed Consent:  The procedure, risks, benefits, and alternatives were discussed with the patient.? There were no contraindications to the procedure.? The patient expressed understanding and agreed to proceed.? Fully informed written consent was obtained.?  ?  Description of the Procedure:  The patient was taken to the operating room.? IV access was obtained prior to the start of the procedure.? The patient was positioned prone on the fluoroscopy table.? Continuous hemodynamic monitoring was initiated and continued throughout the duration of the procedure.? The skin overlying the lumbosacral spine was prepped with Chloraprep and draped into a sterile field.? Fluoroscopy was used to identify the location of the left L4-5 facet joint.?Skin anesthesia was achieved using?0.5 mL of 1% lidocaine over the injection site.? A 22-gauge 3.5-inch Quinke spinal needle was slowly inserted and advanced under intermittent fluoroscopic guidance until it entered the joint capsule.? Negative aspiration for blood or CSF was confirmed.? Then a combination of?10 mg of Kenalog and?0.5 mL of 0.25% bupivacaine was easily injected.? There was no pain on injection.? The needle was removed intact and bleeding was nil.? The same procedure was repeated in identical fashion on the left side at L5-S1 and on the?right side at L4-5 and L5-S1. Sterile bandages were applied.? The patient was taken to the recovery room for further  observation in stable condition.? The patient was then discharged home without any complications.   muscle strength/aerobic capacity/endurance/decreased midline orientation/gait, locomotion, and balance/posture/tone

## 2022-06-13 NOTE — OCCUPATIONAL THERAPY INITIAL EVALUATION ADULT - PERTINENT HX OF CURRENT PROBLEM, REHAB EVAL
68 yo F with a PMH of HTN, HLD, undiagnosed rheumatological disorder in 2007, Type A aortic thoracic dissection 5/2009 s/p repair, s/p descending aortic aneurysm repair 09/2016, spontaneous subdural spinal cord hemorrhage s/p drainage 08/2014 with 2 dethethering of the spinal cord procedures c/p paraplegia now wheelchair bound with multiple UTIs in the setting of self catheterization sent in by her cardiologist for tachycardia.
no chest pain and no edema.

## 2022-07-01 NOTE — PATIENT PROFILE ADULT - NSPROPOAPRESSUREINJURY_GEN_A_NUR
How Severe Is Your Skin Lesion?: moderate Has Your Skin Lesion Been Treated?: not been treated Is This A New Presentation, Or A Follow-Up?: Skin Lesion yes

## 2022-09-28 NOTE — CHART NOTE - NSCHARTNOTEFT_GEN_A_CORE
MICU Transfer Note    Transfer from: MICU    Transfer to: ( X ) Medicine    (  ) Telemetry     (   ) RCU        (    ) Palliative         (   ) Stroke Unit          (   ) __________________    Accepting Physician:  Signout given to:     MICU COURSE:          ASSESSMENT & PLAN:     69 yo Female with a PMH/o aortic dissection s/p multiple repairs, spinal hematoma resulting in paraplegia (on baclofen pump), nephrolithiasis with retained stent (did not f/u to remove as per pt s/p d/c home), recurrent UTIs ESBL positive in the past likely 2/2 to pt straight cath herself, HTN, PAD, and HSV endophthalmitis/keratitis s/p left corneal transplant and extensive h/o recurrent GI bleeds with no identifiable source of bleeding who presented to [facility] on 10/28 with lethargy, weakness, malaise, found to be anemic to 5.2 and was transfused. Patient is s/p EGD and IR angiogram which have not identified source of bleeding. Patient now transferred to [facility] MICU for further management of GI bleed as well as possible surgical interventions.    #Neuro  -A&Ox3, at her baseline  -Neuro checks  -Cont to monitor  -Hx of spinal hematoma leading to paraplegia  -c/w baclofen, oxycodone and lidocaine patch  -c/w duloxetine, ambien, neurontin  -Reposition q2 hours  -HSV endophthalmitis/keratitis s/p left corneal transplant - c/w Prednisolone, and Ofloxacin eye drops   -Valtrex 1g TID    #CV  -Hx of aortic dissection s/p multiple repairs with descending aortic aneurysm  -LV ejection fraction 60-65% on TTE Aug 2019  -Has hx of HTN on Labetalol, will hold for now  -Cont to monitor  -Vasc sx consult as if patient continues to bleed, may require surgery and would like vasc input due to extensive vasc hx, as per Dr. Ambrocio  -C/w lipitor 40 qd    #Pulm  -O2 sat WNL  -Cont to monitor    #GI  -H/o of recurrent bleeds requiring transfusions. S/p multiple EGD's, capsule endoscopy, angiogram all without identifiable source of bleeding.   -s/p 7 PRBC transfusion since admission at OSH  -Surgery had been consulted at OSH regarding need for surgical intervention - partial gastrectomy?  -Will c/s Vascular sx regarding hx of aortic dissection and descending aortic aneurysm, Dr. Ambrocio would like their input in case pt requires surg during admission  -GI c/s to follow - Dr. Ambrocio's covering physician  -Trend CBC q8  -Transfuse for Hb >7  -IV PPI BID  -Cont to monitor    #/Renal - Hx of nephrolithiasis and retained stent  -CT remarkable for mild left hydronephrosis, left collecting system stent and calculus noted within the left distal ureter.  -Stent to be removed as outpatient  -Monitor I/O  -Trend renal function  -Cont to monitor    #ID - hx of ESBL Klebsiella UTI in the past  -ID consult in AM, appreciate recs  -UA at OSH with moderate LE, Ucx negative  -Obtain UA/Ucx, blood cx  -C/w meropenem 1g q8 as patient has positive UA with retained stent and previous hx of ESBL  -Cont to monitor    #Heme  -Anemic due to recurrent GI bleeds without identifiable source of bleeding  -Trend CBC q8  -Transfuse for Hb<7  -Cont to monitor    #Endo  -NPO  -FS q6    #PPx  -SCDs  -NPO  -Full code            FOR FOLLOW UP: MICU Transfer Note    Transfer from: MICU    Transfer to: ( X ) Medicine    (  ) Telemetry     (   ) RCU        (    ) Palliative         (   ) Stroke Unit          (   ) __________________    Accepting Physician:  Signout given to:     69 yo Female with a PMH/o aortic dissection s/p multiple repairs, spinal hematoma resulting in paraplegia (on baclofen pump), nephrolithiasis with retained stent (did not f/u to remove as per pt s/p d/c home), recurrent UTIs ESBL positive in the past 2/2 to straight catherization, HTN, PAD, and HSV endophthalmitis/keratitis s/p left corneal transplant who presented to [facility] on 10/28 with lethargy, weakness, malaise. Per chart review, Pt denied any bright red bleeding per rectum, vomiting, diarrhea, nausea, abdominal pain, but did endorse dark stool. Of note, Pt has been hospitalized multiple times with endoscopies and capsule endoscopies, and CTA's which have all been without source of bleeding. 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 On day of admission the patient's hemoglobin was 5.2. The patient was transfused 6 units of PRBCs. The patient went for an endoscopy on 10/31 and was found to have red blood in the gastric fundus and body but the source of the blood could not be found. IR was consulted for an emergent arteriogram. 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 The patient was intubated and placed under ICU care for acute hypoxic respiratory failure and then extubated the next morning once she was hemodynamically stable. The patient's hemoglobin continues to decline. The patient is also being treated for UTI with meropenem given history of ESBL. On day of transfer the patient will have received 4 days worth of meropenem. Patient is being transferred to [facility] MICU now for further management for her GI bleed as well as possible partial gastrectomy.     Of note, patient recently admitted to [place] on 10/17 for hypotension, found to be anemic. Patient had EGD done x2, capsule endoscopy as well as CTA performed, all of which did not identify source of her bleeding. 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     MICU COURSE:  Briefly in ICU, GI consulted with recommendation to consult vascular surgery,     ASSESSMENT & PLAN:     69 yo Female with a PMH/o aortic dissection s/p multiple repairs, spinal hematoma resulting in paraplegia (on baclofen pump), nephrolithiasis with retained stent (did not f/u to remove as per pt s/p d/c home), recurrent UTIs ESBL positive in the past likely 2/2 to pt straight cath herself, HTN, PAD, and HSV endophthalmitis/keratitis s/p left corneal transplant and extensive h/o recurrent GI bleeds with no identifiable source of bleeding who presented to [facility] on 10/28 with lethargy, weakness, malaise, found to be anemic to 5.2 and was transfused. Patient is s/p EGD and IR angiogram which have not identified source of bleeding. Patient now transferred to [facility] MICU for further management of GI bleed as well as possible surgical interventions.    #Neuro  -A&Ox3, at her baseline  -Neuro checks  -Cont to monitor  -Hx of spinal hematoma leading to paraplegia  -c/w baclofen, oxycodone and lidocaine patch  -c/w duloxetine, ambien, neurontin  -Reposition q2 hours  -HSV endophthalmitis/keratitis s/p left corneal transplant - c/w Prednisolone, and Ofloxacin eye drops   -Valtrex 1g TID    #CV  -Hx of aortic dissection s/p multiple repairs with descending aortic aneurysm  -LV ejection fraction 60-65% on TTE Aug 2019  -Has hx of HTN on Labetalol, will hold for now  -Cont to monitor  -Vasc sx consult as if patient continues to bleed, may require surgery and would like vasc input due to extensive vasc hx, as per Dr. Ambrocio  -C/w lipitor 40 qd    #Pulm  -O2 sat WNL  -Cont to monitor    #GI  -H/o of recurrent bleeds requiring transfusions. S/p multiple EGD's, capsule endoscopy, angiogram all without identifiable source of bleeding.   -s/p 7 PRBC transfusion since admission at OSH  -Surgery had been consulted at OSH regarding need for surgical intervention - partial gastrectomy?  -Will c/s Vascular sx regarding hx of aortic dissection and descending aortic aneurysm, Dr. Ambrocio would like their input in case pt requires surg during admission  -GI c/s to follow - Dr. Ambrocio's covering physician  -Trend CBC q8  -Transfuse for Hb <7  -IV PPI BID  -Cont to monitor    #/Renal - Hx of nephrolithiasis and retained stent  -CT remarkable for mild left hydronephrosis, left collecting system stent and calculus noted within the left distal ureter.  -Stent to be removed as outpatient  -Monitor I/O  -Trend renal function  -Cont to monitor    #ID - hx of ESBL Klebsiella UTI in the past  -ID consult in AM, appreciate recs  -UA at OSH with moderate LE, Ucx negative  -Obtain UA/Ucx, blood cx  -C/w meropenem 1g q8 as patient has positive UA with retained stent and previous hx of ESBL  -Cont to monitor    #Heme  -Anemic due to recurrent GI bleeds without identifiable source of bleeding  -Trend CBC q8  -Transfuse for Hb<7  -Cont to monitor    #Endo  -NPO  -FS q6    #PPx  -SCDs  -NPO  -Full code    FOR FOLLOW UP:    [ ] monitor with serial cbc q8, transfuse for hgb < 7   [ ] f/u GI Dr. Ambrocio  in the AM  and touch base with vascular surgery   [ ] monitor I/O, monitor BMP  [ ] hold dvt ppx in setting of acute bleeding MICU Transfer Note    Transfer from: MICU    Transfer to: ( X ) Medicine    (  ) Telemetry     (   ) RCU        (    ) Palliative         (   ) Stroke Unit          (   ) __________________    Accepting Physician: Mery Felipe   Signout given to:     67 yo Female with a PMH/o aortic dissection s/p multiple repairs, spinal hematoma resulting in paraplegia (on baclofen pump), nephrolithiasis with retained stent (did not f/u to remove as per pt s/p d/c home), recurrent UTIs ESBL positive in the past 2/2 to straight catherization, HTN, PAD, and HSV endophthalmitis/keratitis s/p left corneal transplant who presented to [facility] on 10/28 with lethargy, weakness, malaise. Per chart review, Pt denied any bright red bleeding per rectum, vomiting, diarrhea, nausea, abdominal pain, but did endorse dark stool. Of note, Pt has been hospitalized multiple times with endoscopies and capsule endoscopies, and CTA's which have all been without source of bleeding. 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 On day of admission the patient's hemoglobin was 5.2. The patient was transfused 6 units of PRBCs. The patient went for an endoscopy on 10/31 and was found to have red blood in the gastric fundus and body but the source of the blood could not be found. IR was consulted for an emergent arteriogram. 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 The patient was intubated and placed under ICU care for acute hypoxic respiratory failure and then extubated the next morning once she was hemodynamically stable. The patient's hemoglobin continues to decline. The patient is also being treated for UTI with meropenem given history of ESBL. On day of transfer the patient will have received 4 days worth of meropenem. Patient is being transferred to [facility] MICU now for further management for her GI bleed as well as possible partial gastrectomy.     Of note, patient recently admitted to [place] on 10/17 for hypotension, found to be anemic. Patient had EGD done x2, capsule endoscopy as well as CTA performed, all of which did not identify source of her bleeding. 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     MICU COURSE:  Briefly in ICU, GI consulted with recommendation to consult vascular surgery,     ASSESSMENT & PLAN:     67 yo Female with a PMH/o aortic dissection s/p multiple repairs, spinal hematoma resulting in paraplegia (on baclofen pump), nephrolithiasis with retained stent (did not f/u to remove as per pt s/p d/c home), recurrent UTIs ESBL positive in the past likely 2/2 to pt straight cath herself, HTN, PAD, and HSV endophthalmitis/keratitis s/p left corneal transplant and extensive h/o recurrent GI bleeds with no identifiable source of bleeding who presented to [facility] on 10/28 with lethargy, weakness, malaise, found to be anemic to 5.2 and was transfused. Patient is s/p EGD and IR angiogram which have not identified source of bleeding. Patient now transferred to [facility] MICU for further management of GI bleed as well as possible surgical interventions.    #Neuro  -A&Ox3, at her baseline  -Neuro checks  -Cont to monitor  -Hx of spinal hematoma leading to paraplegia  -c/w baclofen, oxycodone and lidocaine patch  -c/w duloxetine, ambien, neurontin  -Reposition q2 hours  -HSV endophthalmitis/keratitis s/p left corneal transplant - c/w Prednisolone, and Ofloxacin eye drops   -Valtrex 1g TID    #CV  -Hx of aortic dissection s/p multiple repairs with descending aortic aneurysm  -LV ejection fraction 60-65% on TTE Aug 2019  -Has hx of HTN on Labetalol, will hold for now  -Cont to monitor  -Vasc sx consult as if patient continues to bleed, may require surgery and would like vasc input due to extensive vasc hx, as per Dr. Ambrocio  -C/w lipitor 40 qd    #Pulm  -O2 sat WNL  -Cont to monitor    #GI  -H/o of recurrent bleeds requiring transfusions. S/p multiple EGD's, capsule endoscopy, angiogram all without identifiable source of bleeding.   -s/p 7 PRBC transfusion since admission at OSH  -Surgery had been consulted at OSH regarding need for surgical intervention - partial gastrectomy?  -Will c/s Vascular sx regarding hx of aortic dissection and descending aortic aneurysm, Dr. Ambrocio would like their input in case pt requires surg during admission  -GI c/s to follow - Dr. Ambrocio's covering physician  -Trend CBC q8  -Transfuse for Hb <7  -IV PPI BID  -Cont to monitor    #/Renal - Hx of nephrolithiasis and retained stent  -CT remarkable for mild left hydronephrosis, left collecting system stent and calculus noted within the left distal ureter.  -Stent to be removed as outpatient  -Monitor I/O  -Trend renal function  -Cont to monitor    #ID - hx of ESBL Klebsiella UTI in the past  -ID consult in AM, appreciate recs  -UA at OSH with moderate LE, Ucx negative  -Obtain UA/Ucx, blood cx  -C/w meropenem 1g q8 as patient has positive UA with retained stent and previous hx of ESBL  -Cont to monitor    #Heme  -Anemic due to recurrent GI bleeds without identifiable source of bleeding  -Trend CBC q8  -Transfuse for Hb<7  -Cont to monitor    #Endo  -NPO  -FS q6    #PPx  -SCDs  -NPO  -Full code    FOR FOLLOW UP:    [ ] monitor with serial cbc q8, transfuse for hgb < 7   [ ] f/u GI Dr. Ambrocio  in the AM  and touch base with vascular surgery   [ ] monitor I/O, monitor BMP  [ ] hold dvt ppx in setting of acute bleeding Previously Declined (within the last year) MICU Transfer Note    Transfer from: MICU    Transfer to: ( X ) Medicine    (  ) Telemetry     (   ) RCU        (    ) Palliative         (   ) Stroke Unit          (   ) __________________    Accepting Physician: Mery Felipe   Signout given to:     67y/o F h/o aortic dissection s/p multiple repairs, spinal hematoma resulting in paraplegia (on baclofen pump), nephrolithiasis with retained stent (did not f/u to remove as per pt s/p d/c home), recurrent UTIs ESBL positive in the past 2/2 to straight catherization, HTN, PAD, and HSV endophthalmitis/keratitis s/p left corneal transplant who presented to [facility] on 10/28 with lethargy, weakness, malaise. Per chart review, Pt denied any bright red bleeding per rectum, vomiting, diarrhea, nausea, abdominal pain, but did endorse dark stool. Of note, Pt has been hospitalized multiple times with endoscopies and capsule endoscopies, and CTA's which have all been without source of bleeding. 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 On day of admission the patient's hemoglobin was 5.2. The patient was transfused 6 units of PRBCs. The patient went for an endoscopy on 10/31 and was found to have red blood in the gastric fundus and body but the source of the blood could not be found. IR was consulted for an emergent arteriogram. 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 The patient was intubated and placed under ICU care for acute hypoxic respiratory failure and then extubated the next morning once she was hemodynamically stable. The patient's hemoglobin continued to decline. Pt was also being treated for UTI with meropenem given history of ESBL. On day of transfer the patient will have received 4 days worth of meropenem. Patient was transferred to [facility] MICU for further management of GI bleed as well as possible partial gastrectomy. Of note, patient recently admitted to [place] on 10/17 for hypotension, found to be anemic. Patient had EGD done x2, capsule endoscopy as well as CTA performed, all of which did not identify source of her bleeding. 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     MICU COURSE:  Briefly in ICU, GI consulted with recommendation to consult vascular surgery, who recs state that there are no surgical interventions for now    ASSESSMENT & PLAN:     67y/o F h/o aortic dissection s/p multiple repairs, spinal hematoma resulting in paraplegia (on baclofen pump), nephrolithiasis with retained stent (did not f/u to remove as per pt s/p d/c home), recurrent UTIs ESBL positive in the past likely 2/2 to pt straight cath herself, HTN, PAD, and HSV endophthalmitis/keratitis s/p left corneal transplant and extensive h/o recurrent GI bleeds with no identifiable source of bleeding who presented to [facility] on 10/28 with lethargy, weakness, malaise, found to be anemic to 5.2 and was transfused. Patient is s/p EGD and IR angiogram which have not identified source of bleeding. Patient now transferred to [facility] MICU for further management of GI bleed as well as possible surgical interventions.    #Neuro  -A&Ox3, at her baseline  -Neuro checks  -Cont to monitor  -Hx of spinal hematoma leading to paraplegia  -c/w baclofen, oxycodone and lidocaine patch  -c/w duloxetine, ambien, neurontin  -Reposition q2 hours    #CV  -H/o aortic dissection s/p multiple repairs with descending aortic aneurysm  -LV ejection fraction 60-65% on TTE Aug 2019  -Has h/o HTN on Labetalol, will hold for now  -Cont to monitor  -Vasc consult recs no acute surgical intevention  - HLD C/w lipitor 40 qd    #Pulm  -O2 sat WNL  -Cont to monitor    #GI  -H/o recurrent bleeds requiring transfusions. S/p multiple EGD's, capsule endoscopy, angiogram all without identifiable source of bleeding.   -s/p 7 PRBC transfusion since admission at OSH  -Surgery had been consulted at OSH regarding need for surgical intervention suggesting partial gastrectomy  -Vascular surgery consult because h/o aortic dissection and descending aortic aneurysm, Dr. Ambrocio would like their input in case pt requires surg during admission, no surgical interventions needed  -GI c/s to follow - Dr. Ambrocio's covering physician  -Trend CBC q8h  -Transfuse for Hb >7  -IV PPI BID  -Cont to monitor  -NPO with clear liquids beef broth as per GI recs    #/Renal - Hx of nephrolithiasis and retained stent  -CT remarkable for mild left hydronephrosis, left collecting system stent and calculus noted within the left distal ureter - urology consult needed for calculus  -Stent to be removed as outpatient  -Monitor I/O  -Trend renal function  -Cont to monitor    #ID - h/o ESBL Klebsiella UTI in the past  -ID consult in AM, appreciate recs  -UA at OSH with moderate LE, Ucx negative  -Obtain UA/Ucx, blood cx  -C/w meropenem 1g q8 as patient has positive UA with retained stent and previous hx of ESBL  -HSV endophthalmitis/keratitis s/p left corneal transplant - c/w Prednisolone, Ofloxacin eye drops, Valtrex 1g TID  -Cont to monitor    #Heme  -Anemic due to recurrent GI bleeds without identifiable source of bleeding  -Trend CBC q8  -Transfuse for Hb<7  -Cont to monitor    #Endo  -FS q6    #PPx  -SCDs  -NPO  -Full code    FOR FOLLOW UP:    [ ] monitor with serial cbc q8h, transfuse for hgb < 7   [ ] f/u GI Dr. Ambrocio in the AM and touch base with vascular surgery   [ ] pt NPO, only clear liquids and broth as per GI recs  [ ] urology consult for kidney stone and retained stent (did not f/u to remove)  [ ] monitor I/O, monitor BMP  [ ] hold dvt ppx in setting of acute bleeding

## 2022-11-08 NOTE — PHYSICAL THERAPY INITIAL EVALUATION ADULT - FUNCTIONAL LIMITATIONS, PT EVAL
I am happy to report that the CT of your brain and face did not show any evidence of bleeding or fractures. We also did x-rays of your hips and bilateral shins. There is no fractures. We did not do a CAT scan of your chest or abdomen. Please use 500 mg of Tylenol every 4-6 hours as needed for pain control. If you have significant pain, shortness of breath, vomiting, please return to the emergency room for additional imaging. Otherwise, call your primary care doctor in the morning and make an appointment to be seen later in the week for checkup.
home management/self-care/community/leisure

## 2022-11-13 NOTE — DISCHARGE NOTE PROVIDER - NSDCFUADDAPPT_GEN_ALL_CORE_FT
-Please follow up with Dr. Barriga on 11/25/19 at 2:00PM.  The office is located at [facility], [facility], 4th floor. Call us with any questions  #[phone].    - Please follow up with your vascular surgeon Dr. Tai Rock on 11/25/19 at 1:30 PM.     - Please follow up with your urologist Dr.______ The office will call you with an appointment. If you do not receive an appointment in 1-2 days please call the office and make one. This appointment is to follow up for your kidney stones and stent.     -Walk daily as tolerated and use your incentive spirometer every hour.    -No driving or strenuous activity/exercise until  cleared by your surgeon.    -Gently clean your incisions with anti-bacterial soap and water, pat  dry.  You may leave them open to air.    -Call your doctor if you have shortness of breath, chest pain not  relieved by pain medication, dizziness, fever >101.5, or increased  redness or drainage from incisions, dark black stools, blood in stool, dizziness, lightheadness. Implemented All Universal Safety Interventions:  [place] to call system. Call bell, personal items and telephone within reach. Instruct patient to call for assistance. Room bathroom lighting operational. Non-slip footwear when patient is off stretcher. Physically safe environment: no spills, clutter or unnecessary equipment. Stretcher in lowest position, wheels locked, appropriate side rails in place. -Please follow up with Dr. Barriga on 11/25/19 at 2:00PM.  The office is located at [facility], 4th floor. Call us with any questions  #[phone].    - Please follow up with your vascular surgeon Dr. Tai Rock on 11/25/19 at 1:30 PM.     - Please follow up with your urologist . The office will call you with an appointment. If you do not receive an appointment in 1-2 days please call the office and make one. This appointment is to follow up for your kidney stones and stent.     -Walk daily as tolerated and use your incentive spirometer every hour.    -No driving or strenuous activity/exercise until  cleared by your surgeon.    -Gently clean your incisions with anti-bacterial soap and water, pat  dry.  You may leave them open to air.    -Call your doctor if you have shortness of breath, chest pain not  relieved by pain medication, dizziness, fever >101.5, or increased  redness or drainage from incisions, dark black stools, blood in stool, dizziness, lightheadness.

## 2022-12-07 NOTE — ED ADULT NURSE NOTE - HOW OFTEN DO YOU HAVE A DRINK CONTAINING ALCOHOL?
9.4    2.58  )-----------( 390      ( 06 Dec 2022 09:25 )             27.4   12-06    140  |  104  |  14  ----------------------------<  91  3.7   |  23  |  0.24<L>    Ca    9.3      06 Dec 2022 09:25  Phos  5.0     12-06  Mg     1.90     12-06    TPro  x   /  Alb  x   /  TBili  x   /  DBili  x   /  AST  x   /  ALT  63<H>  /  AlkPhos  x   12-07
Never

## 2022-12-20 NOTE — ED ADULT NURSE NOTE - CCCP TRG CHIEF CMPLNT
see chief complaint quote A-T Advancement Flap Text: The defect edges were debeveled with a #15 scalpel blade.  Given the location of the defect, shape of the defect and the proximity to free margins an A-T advancement flap was deemed most appropriate.  Using a sterile surgical marker, an appropriate advancement flap was drawn incorporating the defect and placing the expected incisions within the relaxed skin tension lines where possible.    The area thus outlined was incised deep to adipose tissue with a #15 scalpel blade.  The skin margins were undermined to an appropriate distance in all directions utilizing iris scissors.

## 2023-03-03 NOTE — CONSULT NOTE ADULT - CONSULT REASON
recurrent GI bleed Methotrexate Pregnancy And Lactation Text: This medication is Pregnancy Category X and is known to cause fetal harm. This medication is excreted in breast milk.

## 2023-03-28 NOTE — ED PROVIDER NOTE - TIMING
Pt's wife called to ask if it was okay to give pt the super b complex vitamins. She is not sure if that is too much to give him. Please call and advise. sudden onset

## 2023-04-05 NOTE — PROVIDER CONTACT NOTE (OTHER) - DATE AND TIME:
08-Feb-2018 02:10 Burow's Graft Text: The defect edges were debeveled with a #15 scalpel blade. Given the location of the defect, shape of the defect, the proximity to free margins and the presence of a standing cone deformity a Burow's skin graft was deemed most appropriate. The standing cone was removed and this tissue was then trimmed to the shape of the primary defect. The adipose tissue was also removed until only dermis and epidermis were left.  The skin margins of the secondary defect were undermined to an appropriate distance in all directions utilizing iris scissors.  The secondary defect was closed with interrupted buried subcutaneous sutures.  The skin edges were then re-apposed with running  sutures.  The skin graft was then placed in the primary defect and oriented appropriately.

## 2023-06-06 NOTE — DIETITIAN INITIAL EVALUATION ADULT. - DIET TYPE
Interval History: pt in bed upon exam with no signs of acute distress noted. Pt taken to cath lab today with stent to LAD replaced. Brillinta initiated and Lopressor resumed. Cath site intact. Cardiology following.     Review of Systems   Constitutional:  Negative for chills, diaphoresis, fatigue and fever.   HENT:  Negative for drooling, ear pain, rhinorrhea and sore throat.    Eyes: Negative.    Respiratory:  Negative for cough, shortness of breath and wheezing.    Cardiovascular:  Negative for palpitations and leg swelling.   Gastrointestinal:  Negative for abdominal pain, constipation, diarrhea, nausea and vomiting.   Endocrine: Negative.    Genitourinary:  Negative for dysuria, hematuria and urgency.   Musculoskeletal: Negative.    Skin:  Negative for color change and wound.   Allergic/Immunologic: Negative.    Neurological:  Negative for dizziness, syncope and speech difficulty.   Hematological: Negative.    Psychiatric/Behavioral: Negative.     Objective:     Vital Signs (Most Recent):  Temp: 98.1 °F (36.7 °C) (06/06/23 1535)  Pulse: 68 (06/06/23 1535)  Resp: 18 (06/06/23 1535)  BP: (!) 195/76 (06/06/23 1535)  SpO2: 99 % (06/06/23 1535) Vital Signs (24h Range):  Temp:  [97.6 °F (36.4 °C)-98.5 °F (36.9 °C)] 98.1 °F (36.7 °C)  Pulse:  [54-83] 68  Resp:  [10-18] 18  SpO2:  [98 %-100 %] 99 %  BP: (103-195)/(34-76) 195/76     Weight: 75.8 kg (167 lb)  Body mass index is 22.65 kg/m².    Intake/Output Summary (Last 24 hours) at 6/6/2023 1627  Last data filed at 6/6/2023 1216  Gross per 24 hour   Intake 1288.96 ml   Output 500 ml   Net 788.96 ml         Physical Exam  Vitals and nursing note reviewed.   Constitutional:       General: He is not in acute distress.     Appearance: He is well-developed.   HENT:      Head: Normocephalic and atraumatic.      Mouth/Throat:      Mouth: Mucous membranes are moist.   Eyes:      Extraocular Movements: Extraocular movements intact.      Conjunctiva/sclera: Conjunctivae normal.  RD to add Magic Cup x2 daily/regular   Cardiovascular:      Rate and Rhythm: Normal rate and regular rhythm.      Heart sounds: Normal heart sounds.   Pulmonary:      Effort: Pulmonary effort is normal. No respiratory distress.      Breath sounds: Normal breath sounds.   Abdominal:      General: Bowel sounds are normal. There is no distension.      Palpations: Abdomen is soft.      Tenderness: There is no abdominal tenderness.   Musculoskeletal:         General: Normal range of motion.      Cervical back: Normal range of motion and neck supple. No edema.   Skin:     General: Skin is warm and dry.      Capillary Refill: Capillary refill takes less than 2 seconds.   Neurological:      General: No focal deficit present.      Mental Status: He is alert and oriented to person, place, and time. Mental status is at baseline.           Significant Labs: All pertinent labs within the past 24 hours have been reviewed.  CBC:   Recent Labs   Lab 06/05/23 0544 06/05/23  0751 06/06/23 0612   WBC 2.32* 4.99 3.46*   HGB 23.5* 11.9* 11.1*   HCT 63.5* 32.8* 31.9*   PLT 43* 148* 137*     CMP:   Recent Labs   Lab 06/05/23 0544 06/05/23  1628 06/06/23 0612   * 134* 135*   K 3.1* 3.5 3.7   CL 94* 95 97   CO2 27 31* 28   GLU 83 88 80   BUN 18 17 17   CREATININE 1.2 1.1 1.0   CALCIUM 8.9 8.9 8.9   PROT 5.7*  --  5.3*   ALBUMIN 3.0*  --  2.8*   BILITOT 1.1*  --  0.9   ALKPHOS 67  --  90   AST 39  --  30   ALT 42  --  36   ANIONGAP 12 8 10     Magnesium:   Recent Labs   Lab 06/05/23 0544 06/06/23 0612   MG 2.1 1.9     Troponin: No results for input(s): TROPONINI, TROPONINIHS in the last 48 hours.    Significant Imaging: I have reviewed all pertinent imaging results/findings within the past 24 hours.   DASH/TLC

## 2023-09-21 NOTE — ED ADULT NURSE NOTE - IS THE PATIENT ABLE TO BE SCREENED?
Tuesday, Thursday and Saturday       Please dispense 30 day quantity when ordering supplies     Follow up with Dr. [facility] In 1 weeks in the wound care center  Call [phone] for any questions or concerns
Yes

## 2023-09-27 NOTE — PROGRESS NOTE ADULT - PROBLEM SELECTOR PLAN 6
Patient had recent left sided nephrolithiasis with urethral stent placement  KUB showing rounded density over L utereter as seen on CT previously  - urology follow up opt Ambulatory

## 2023-10-03 NOTE — ASU PATIENT PROFILE, ADULT - ABILITY TO HEAR (WITH HEARING AID OR HEARING APPLIANCE IF NORMALLY USED):
Initiate Treatment: Eucrisa 2 % topical ointment\\n\\nhydrocortisone 2.5 % topical ointment Apply to rash on face BID PRN. Max 2 weeks Adequate: hears normal conversation without difficulty Continue Regimen: triamcinolone acetonide 0.1 % topical ointment Apply to eczema on arms BID PRN Samples Given: Eucrisa Detail Level: Zone

## 2023-10-16 NOTE — PROGRESS NOTE ADULT - ASSESSMENT
68F with h/o aortic dissection s/p multiple repairs, spinal hematoma resulting in paraplegia (on baclofen pump), recurrent ESBL UTI, HTN, PAD, and HSV endophthalmitis/keratitis s/p left corneal transplant, multiple GIB w/ multiple endoscopic evaluation and required splenic and left gastric arterial embolization 11/7/19 at [facility] now return to [facility] for AMS transferred to [facility] after found to have hypotension and anemia.    #Melena  Patient with acute drop in hgb following melenic stool with hemodynamic instability.  Bedside EGD was notable for old blood in the stomach with mod gastritis and a large gastric body mass and prior hemoclip.  No source of bleeding was identified and no evidence of active bleeding.  No further bleeding overnight and hgb with slight downtrend.  Review of CTA without obvious finding to suggest UGIB.  Further discussion with Lloyd Vazquez, intensivist and vascular surgery at length.  Now pending consideration of EUS evaluation of gastric lesion.    -Keep NPO  -Tentative plan for EUS  -Continue PPI BID IV  -Trend CBC  -Transfuse per primary team  -Maintain active T&S and large bore IV    Please call on-call GI fellow if patient develops overt GI bleeding or change in hemodynamics    Recommendation d/w primary team  Case d/w svc attending Dupixent Pregnancy And Lactation Text: This medication likely crosses the placenta but the risk for the fetus is uncertain. This medication is excreted in breast milk.

## 2023-11-06 NOTE — ED ADULT NURSE NOTE - SUICIDE SCREENING QUESTION 3
Low will meet with Deidra  ([employer] worker) and work on choosing a primary doctor.     Deidra is going to call [employer] to find out how to order medical rides to and from your apopoinments so that you can use your wheelchair.       For Deidra or your [employer] worker to help you follow up on ankle brace appoitntments so that we can work on walking .   Orthotics  Please call the location that is most convenient to you to schedule.    [facility]: [phone]  Orthotists: Ochoa LOPEZ [facility] [place]  [address] , [address]  [place] [address]    [facility]: [phone]  Orthotist: Tyrone LOPEZ [facility] [place]  [address] [place] [address]     [facility]: [phone]  Orthotist: Selvin [facility]OPEZ [facility]  [address], [address]  [place] [address]    [facility]: [phone]  Orthotists: Jag LOPEZ [facility] [place]  [address], [address]  [place] [address]    [facility]: [phone]  Orthotist: Polo LOPEZ [facility]  [address], [address]  [place] [address]      [employer]  Orthotist: Johanna Chapin  Office: [phone], and select option #2 for Orthotics - call to schedule at any site  Fax: [phone]    Monday: [facility]  Wednesday: [place]  Tuesday, Thursday, Friday: Lyly ALVA [facility]  [address], [address], [place] [address])        Shoe recommendations:  Go to [employer] - they aren't afraid of AFOs! - [email or web address]  Adjustable width shoes: [email or web address]  Sturdy slippers Teva Ember Moc: [email or web address]     No

## 2023-11-13 NOTE — CONSULT NOTE ADULT - PROBLEM SELECTOR PROBLEM 3
Gastrointestinal hemorrhage, unspecified gastrointestinal hemorrhage type
warm and dry/color normal/normal/no rashes/no ulcers

## 2023-11-17 NOTE — ED PROVIDER NOTE - NS_ATTENDINGSCRIBE_ED_ALL_ED
Strong peripheral pulses
I personally performed the service described in the documentation recorded by the scribe in my presence, and it accurately and completely records my words and actions.

## 2024-01-13 NOTE — OCCUPATIONAL THERAPY INITIAL EVALUATION ADULT - RANGE OF MOTION EXAMINATION, UPPER EXTREMITY
For information on Fall & Injury Prevention, visit: [email or web address] OR  [email or web address] OR  https://www.cdc.gov/steadi/patient.html For information on Fall & Injury Prevention, visit: [email or web address]://[email or web address]/[email or web address]-[email or web address] OR  [email or web address]://www.[email or web address]/[email or web address]-[email or web address]-avoid-injury OR  https://www.cdc.gov/steadi/patient.html L wrist/digit extension to neutral only,/bilateral UE Active ROM was WFL  (within functional limits)

## 2024-03-20 NOTE — PATIENT PROFILE ADULT - NSPROGENPREVTRANSF_GEN_A_NUR
Physical Therapy Visit    Visit Type: Daily Treatment Note  Visit: 13  Referring Provider: Margarito Butts MD  Medical Diagnosis (from order): S46.912D - Strain of left shoulder, subsequent encounter  S46.812D - Strain of left trapezius muscle, subsequent encounter  S29.011D - Muscle strain of chest wall, subsequent encounter     SUBJECTIVE                                                                                                               Still feeling some pain in the left upper trap during cervical rotation. Push up is still difficult. Pain is typically around 2/10.     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 Thumb still feels weak. Performing cervical retraction multiple times throughout the day.      is in chemo on Wednesday, not really sleeping on Wednesdays as a result. Stress may be impacting symptoms.       OBJECTIVE                                                                                                                    Observation   - increased radicular symptoms with mobilization with movement with pressure at cervical spine  - decreased radicular symptoms with mobilization with movement with pressure at left upper trapezius   - radicular symptoms abolished with manual traction                    Treatment     Therapeutic Exercise  Performed in order to improve cervical and shoulder mobility for increased independence with functional mobility and ADLs.  - tricep push up on the wall x10  - chest press x10 with 3lbs  - mobilization with movement into left rotation with pressure into left upper trap    Manual Therapy   Performed to relieve muscle tension and reduce pain  - soft tissue mobilitzation/myofacial release to carlos levator scap, upper trapezius, sternocleidomastoid, and scalenes   - manual traction    Skilled input: verbal  instruction/cues and as detailed above    Writer verbally educated and received verbal consent for hand placement, positioning of patient, and techniques to be performed today from patient   Home Exercise Program  Access Code: [email or web address]  URL: [email or web address]  Date: 03/04/2024  Prepared by: Anayeli Ross    Program Notes  /    Exercises  - Cervical Retraction at Wall  - 1 x daily - 7 x weekly - 10 reps  - Seated Assisted Cervical Rotation with Towel  - 1 x daily - 7 x weekly - 10 reps  - Supine Shoulder Horizontal Abduction with Resistance  - 1 x daily - 7 x weekly - 10 reps  - Push-Up on Counter  - 1 x daily - 7 x weekly - 10 reps  - Standing Elbow Extension with Anchored Resistance  - 1 x daily - 7 x weekly - 10 reps      ASSESSMENT                                                                                                            Shabnam is doing well with her transition back to work. 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 She continues to report pain and tightness in the cervical spine and upper trap. She also experiences an increase in radicular symptoms during mobilization with movement with pressure to the left transverse processes of the mid cervical spine. However, she experiences decreased symptoms when pressure is applied to the upper trap. Patient was educated on self-soft tissue release to the upper trap to help manage symptoms when at work. She struggles with shoulder stability in closed chain, so push up was modified. Continued skilled physical therapy is medically necessary to facilitate progress towards stated goals.     PLAN                                                                                                                           Suggestions for next session as indicated:    Periscapular strength  Closed chain stability  Manual PRN    Goals  Long Term Goals: to be met by end of plan of care  1. Patient will be able to turn to check her blind spot without complaint of pain, difficulty, or need for compensation.  Status: progressing/ongoing  2. Patient will be able to perform upper body dressing without complaint of pain, difficulty, or need for compensation.  Status: progressing/ongoing  3. Patient will be able to lift 50lbs with minimal pain/difficulty and good mechanics to facilitate a return to work Status: progressing/ongoing  4. Patient will improve QuickDASH score from 52.27 at eval to no greater than 20, indicating improved upper quarter functional mobility.   Status: met       Therapy procedure time and total treatment time can be found documented on the Time Entry flowsheet     no

## 2024-04-17 NOTE — ED ADULT TRIAGE NOTE - CHIEF COMPLAINT QUOTE
Received call from Dr. Phillip's office in relation to EKG. Patient appears to be in atrial flutter. Currently taking Warfarin and Metoprolol. HR with visit today was 74. Per chart review it does appear that A flutter is linked to warfarin dosing from 5/31/24. Will attach below.         Will forward to Dr. Harman/Sabina James NP to review and advise if any further recommendations are warranted.    pt presents to ED due to GI bleeding pt was DC from [facility] at 530pm sent by MD for Ct Angio pts  called MD Majano due to low Bp at home

## 2024-04-22 NOTE — PROVIDER CONTACT NOTE (OTHER) - SITUATION
disruption, so further dilatations were not performed.  The stomach was decompressed.  Procedure terminated.  It was well tolerated.    Estimated blood loss is 0.    IMPRESSION:  Recurrent stricture.  Conservative approach.  Dilation p.resther EWING MD      D:  04/22/2024 07:15:51     T:  04/22/2024 08:49:04     NASEEM/TATYANA  Job #:  [id number]     Doc#:  [id number]      spoke with Nancy from Dr. He's office consult information was provided.

## 2024-05-07 NOTE — ED PROVIDER NOTE - DR. NAME
Spoke to mother Beth    Patient communication     Notified patient to stop at [facility] - 9th Floor [address] [facility], Please park in [place] and use [place] elevators 60 minutes prior to your appointment time to remove cast/splint on 5.8.24 with Dr. Lerma for x-rays.     Made them aware that this is not a scheduled xray appointment and they might be running behind as they are considered a walk-in xray.    Verbalized the Following:  *Please arrive at your informed time above, if you are more than 15 Minutes late to your appointment with Dr. Lerma we will have to reschedule your appointment. This will allow you to be seen in a timely manor and be conscious to other patients being seen that same day*              Mary

## 2024-09-19 NOTE — DIETITIAN INITIAL EVALUATION ADULT. - ETIOLOGY
Render Risk Assessment In Note?: no Detail Level: Detailed Additional Notes: A few lesions treated with cryotherapy today as a courtesy. decreased ability to consume sufficient energy/protein 2/2 GIB

## 2024-09-24 NOTE — H&P PST ADULT - HISTORY OF PRESENT ILLNESS
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 Came in for [facility]St today with nursing aid for Stage ! spinal cord stimulator trial and stage 2 spinal stimulator on 1/16/2019. Patient has hx of worsening back pains which not relieved by pain medications. Patient was referred to Dr Vasquez , evaluated  and now scheduled this procedure for tee tment.

## 2024-10-04 NOTE — ED ADULT TRIAGE NOTE - AS TEMP SITE
Patient ready for DC, paperwork reviewed with patient, all questions answered. Patient destination Home, wife waiting in the lobby, patient refused transport to lobby   oral

## 2024-10-07 NOTE — H&P ADULT - NSHPLABSRESULTS_GEN_ALL_CORE
CBC Full  -  ( 12 Aug 2019 00:36 )  WBC Count : 20.22 K/uL  RBC Count : 1.99 M/uL  Hemoglobin : 4.3 g/dL  Hematocrit : 15.2 %  Platelet Count - Automated : 550 K/uL  Mean Cell Volume : 76.4 fl  Mean Cell Hemoglobin : 21.6 pg  Mean Cell Hemoglobin Concentration : 28.3 gm/dL  Auto Neutrophil # : 17.19 K/uL  Auto Lymphocyte # : 1.82 K/uL  Auto Monocyte # : 0.81 K/uL  Auto Eosinophil # : 0.40 K/uL  Auto Basophil # : 0.00 K/uL  Auto Neutrophil % : 85.0 %  Auto Lymphocyte % : 9.0 %  Auto Monocyte % : 4.0 %  Auto Eosinophil % : 2.0 %  Auto Basophil % : 0.0 %      08-12    142  |  106  |  18  ----------------------------<  247<H>  5.7<H>   |  22  |  1.18    Ca    9.1      12 Aug 2019 00:36  Mg     2.7     08-12    TPro  6.4  /  Alb  2.6<L>  /  TBili  0.4  /  DBili  x   /  AST  8<L>  /  ALT  9<L>  /  AlkPhos  121<H>  08-12 07-Oct-2024 23:19

## 2024-10-07 NOTE — DISCHARGE NOTE PROVIDER - NSCORESITESY/N_GEN_A_CORE_RD
----- Message from Marjorie Godoy sent at 10/7/2024  6:26 AM CDT -----  Please call patient and let her know that her chlamydia test is still positive.  I have sent in another Rx for her to .  Her partner also needs to be treated and she should avoid any sexual activity until retested.   Yes

## 2024-10-12 NOTE — PHYSICAL THERAPY INITIAL EVALUATION ADULT - LEVEL OF INDEPENDENCE: SUPINE/SIT, REHAB EVAL
Pt is a 58 year old male presenting for generalized weakness associated with inability to ambulate and reported his "head felt funny". Of note, patient with recent admission to [facility] for labetalol OD c/b MRSA skin infection to scalp requiring surgery I&D; patient discharged to [facility] with recent d/c home 09/27. In ED, patient s/p mechanical fall in restroom.
moderate assist (50% patients effort)

## 2024-10-17 NOTE — CHART NOTE - NSCHARTNOTEFT_GEN_A_CORE
"INFECTIOUS DISEASES PROGRESS NOTE    Consulted / following patient for:  Respiratory failure/pneumonia  Recent polymicrobial complicated postoperative lumbar wound infection, MRSA and Proteus  Acute kidney injury    Subjective   Interval History:   Says she thinks she is breathing \"okay,\" and denies chest pain or sputum production    Objective   PHYSICAL EXAMINATION  Vital signs:  Visit Vitals  /60   Pulse 87   Temp 36.8 °C (98.2 °F) (Temporal)   Resp 22   No vasopressor agents in use  Oxygen saturation 94% on high-flow nasal oxygen, 80%  General: Sitting upright in bed, appears weak and dyspneic, responsive, does not appear toxic  Lungs: Diminished on the left  Heart:  S1, S2 normal  Abdomen:  Soft, obese, no guarding.   Extremities:  No cords, phlebitis, cellulitis.  Edematous    Relevant Results  WBC: 15,900 --> ---> ---> 9900   Creatinine (baseline 0.66): 1.46 ---> 1.43 ---> 1.33 ---> 1.19 ---> 1.17 ---> 1.23 ---> 1.38    Microbiology:  Blood (10/12): Negative X2  Sputum: Stain with moderate GNB and moderate PMN, culture Pseudomonas aeruginosa, susceptible to Zosyn and cefepime  Urine: Moderate colony count Candida lusitaniae    Imaging:  CXR images (10/17) personally reviewed: Since yesterday's films she has developed complete opacification of the left hemithorax, either due to mucous plugging and atelectasis or increasing pleural effusion      Assessment:  Sepsis -likely due to pneumonia, present on admission. Patient already on IV vancomycin and IV ceftriaxone at time of this admission for lumbar spinal infection.  Thus far no evidence for vascular catheter infection or recurrent bacteremia.  Liberated from the ventilator.  Pseudomonas in the sputum  Lumbar spine surgical site infection s/p I&D 9/28. Cultures + MRSA, Proteus. On vanco/ceftriaxone through 11/12. Followed by Dr. Brant Juarez.  1 slightly suspicious area of separation of the wound edges in the superior portion of the incision   " Spoke with Dr. Conway, Kiersten Jalloh, and Abbie.  Will obtain left lower extremity sonogram to r/o dvt as pt now with left lower extremity pain.  Ok to start lovenox BID, and continue to monitor closely.  Will hold off on IVC filter at this time until further evaluation of left lower extremity and will reevaluate.   Plan/Recommendations:  Continue IV vancomycin - check Cr daily. Pharmacy to dose and monitor  Continue cefepime through 10/22, then revert to ceftriaxone as planned for lumbar infection, until 11/12  Ultrasound left chest, thoracocentesis if effusion  Midline catheter will need to be replaced with a new midline catheter or PICC prior to her discharge    Discussed with ICU team    Andrew Malagon MD  [facility]  Office:  [phone]

## 2024-11-13 NOTE — ED PROVIDER NOTE - EKG #1 DATE/TIME
Infliximab Pregnancy And Lactation Text: This medication is Pregnancy Category B and is considered safe during pregnancy. It is unknown if this medication is excreted in breast milk. Skyrizi Counseling: I discussed with the patient the risks of risankizumab-rzaa including but not limited to immunosuppression, and serious infections.  The patient understands that monitoring is required including a PPD at baseline and must alert us or the primary physician if symptoms of infection or other concerning signs are noted. 20-Dec-2017 17:03 Klisyri Counseling:  I discussed with the patient the risks of Klisyri including but not limited to erythema, scaling, itching, weeping, crusting, and pain. Olanzapine Pregnancy And Lactation Text: This medication is pregnancy category C.   There are no adequate and well controlled trials with olanzapine in pregnant females.  Olanzapine should be used during pregnancy only if the potential benefit justifies the potential risk to the fetus.   In a study in lactating healthy women, olanzapine was excreted in breast milk.  It is recommended that women taking olanzapine should not breast feed. Quinolones Pregnancy And Lactation Text: This medication is Pregnancy Category C and it isn't know if it is safe during pregnancy. It is also excreted in breast milk. Oxybutynin Pregnancy And Lactation Text: This medication is Pregnancy Category B and is considered safe during pregnancy. It is unknown if it is excreted in breast milk. Elidel Pregnancy And Lactation Text: This medication is Pregnancy Category C. It is unknown if this medication is excreted in breast milk. High Dose Vitamin A Counseling: Side effects reviewed, pt to contact office should one occur. Topical Metronidazole Counseling: Metronidazole is a topical antibiotic medication. You may experience burning, stinging, redness, or allergic reactions.  Please call our office if you develop any problems from using this medication. Cephalexin Pregnancy And Lactation Text: This medication is Pregnancy Category B and considered safe during pregnancy.  It is also excreted in breast milk but can be used safely for shorter doses. Methotrexate Counseling:  Patient counseled regarding adverse effects of methotrexate including but not limited to nausea, vomiting, abnormalities in liver function tests. Patients may develop mouth sores, rash, diarrhea, and abnormalities in blood counts. The patient understands that monitoring is required including LFT's and blood counts.  There is a rare possibility of scarring of the liver and lung problems that can occur when taking methotrexate. Persistent nausea, loss of appetite, pale stools, dark urine, cough, and shortness of breath should be reported immediately. Patient advised to discontinue methotrexate treatment at least three months before attempting to become pregnant.  I discussed the need for folate supplements while taking methotrexate.  These supplements can decrease side effects during methotrexate treatment. The patient verbalized understanding of the proper use and possible adverse effects of methotrexate.  All of the patient's questions and concerns were addressed. Propranolol Pregnancy And Lactation Text: This medication is Pregnancy Category C and it isn't known if it is safe during pregnancy. It is excreted in breast milk. Isotretinoin Pregnancy And Lactation Text: This medication is Pregnancy Category X and is considered extremely dangerous during pregnancy. It is unknown if it is excreted in breast milk. Terbinafine Pregnancy And Lactation Text: This medication is Pregnancy Category B and is considered safe during pregnancy. It is also excreted in breast milk and breast feeding isn't recommended. Libtayo Pregnancy And Lactation Text: This medication is contraindicated in pregnancy and when breast feeding. Tetracycline Counseling: Patient counseled regarding possible photosensitivity and increased risk for sunburn.  Patient instructed to avoid sunlight, if possible.  When exposed to sunlight, patients should wear protective clothing, sunglasses, and sunscreen.  The patient was instructed to call the office immediately if the following severe adverse effects occur:  hearing changes, easy bruising/bleeding, severe headache, or vision changes.  The patient verbalized understanding of the proper use and possible adverse effects of tetracycline.  All of the patient's questions and concerns were addressed. Patient understands to avoid pregnancy while on therapy due to potential birth defects. Dapsone Counseling: I discussed with the patient the risks of dapsone including but not limited to hemolytic anemia, agranulocytosis, rashes, methemoglobinemia, kidney failure, peripheral neuropathy, headaches, GI upset, and liver toxicity.  Patients who start dapsone require monitoring including baseline LFTs and weekly CBCs for the first month, then every month thereafter.  The patient verbalized understanding of the proper use and possible adverse effects of dapsone.  All of the patient's questions and concerns were addressed. Propranolol Counseling:  I discussed with the patient the risks of propranolol including but not limited to low heart rate, low blood pressure, low blood sugar, restlessness and increased cold sensitivity. They should call the office if they experience any of these side effects. High Dose Vitamin A Pregnancy And Lactation Text: High dose vitamin A therapy is contraindicated during pregnancy and breast feeding. Arava Counseling:  Patient counseled regarding adverse effects of Arava including but not limited to nausea, vomiting, abnormalities in liver function tests. Patients may develop mouth sores, rash, diarrhea, and abnormalities in blood counts. The patient understands that monitoring is required including LFTs and blood counts.  There is a rare possibility of scarring of the liver and lung problems that can occur when taking methotrexate. Persistent nausea, loss of appetite, pale stools, dark urine, cough, and shortness of breath should be reported immediately. Patient advised to discontinue Arava treatment and consult with a physician prior to attempting conception. The patient will have to undergo a treatment to eliminate Arava from the body prior to conception. Erivedge Counseling- I discussed with the patient the risks of Erivedge including but not limited to nausea, vomiting, diarrhea, constipation, weight loss, changes in the sense of taste, decreased appetite, muscle spasms, and hair loss.  The patient verbalized understanding of the proper use and possible adverse effects of Erivedge.  All of the patient's questions and concerns were addressed. Eucrisa Pregnancy And Lactation Text: This medication has not been assigned a Pregnancy Risk Category but animal studies failed to show danger with the topical medication. It is unknown if the medication is excreted in breast milk. Rinvoq Pregnancy And Lactation Text: Based on animal studies, Rinvoq may cause embryo-fetal harm when administered to pregnant women.  The medication should not be used in pregnancy.  Breastfeeding is not recommended during treatment and for 6 days after the last dose. Eucrisa Counseling: Patient may experience a mild burning sensation during topical application. Eucrisa is not approved in children less than 2 years of age. Humira Counseling:  I discussed with the patient the risks of adalimumab including but not limited to myelosuppression, immunosuppression, autoimmune hepatitis, demyelinating diseases, lymphoma, and serious infections.  The patient understands that monitoring is required including a PPD at baseline and must alert us or the primary physician if symptoms of infection or other concerning signs are noted. Prednisone Pregnancy And Lactation Text: This medication is Pregnancy Category C and it isn't know if it is safe during pregnancy. This medication is excreted in breast milk. Olumiant Counseling: I discussed with the patient the risks of Olumiant therapy including but not limited to upper respiratory tract infections, shingles, cold sores, and nausea. Live vaccines should be avoided.  This medication has been linked to serious infections; higher rate of mortality; malignancy and lymphoproliferative disorders; major adverse cardiovascular events; thrombosis; gastrointestinal perforations; neutropenia; lymphopenia; anemia; liver enzyme elevations; and lipid elevations. Erythromycin Counseling:  I discussed with the patient the risks of erythromycin including but not limited to GI upset, allergic reaction, drug rash, diarrhea, increase in liver enzymes, and yeast infections. Olumiant Pregnancy And Lactation Text: Based on animal studies, Olumiant may cause embryo-fetal harm when administered to pregnant women.  The medication should not be used in pregnancy.  Breastfeeding is not recommended during treatment. Metronidazole Counseling:  I discussed with the patient the risks of metronidazole including but not limited to seizures, nausea/vomiting, a metallic taste in the mouth, nausea/vomiting and severe allergy. Nsaids Pregnancy And Lactation Text: These medications are considered safe up to 30 weeks gestation. It is excreted in breast milk. Vtama Pregnancy And Lactation Text: It is unknown if this medication can cause problems during pregnancy and breastfeeding. Doxepin Pregnancy And Lactation Text: This medication is Pregnancy Category C and it isn't known if it is safe during pregnancy. It is also excreted in breast milk and breast feeding isn't recommended. Taltz Pregnancy And Lactation Text: The risk during pregnancy and breastfeeding is uncertain with this medication. Siliq Counseling:  I discussed with the patient the risks of Siliq including but not limited to new or worsening depression, suicidal thoughts and behavior, immunosuppression, malignancy, posterior leukoencephalopathy syndrome, and serious infections.  The patient understands that monitoring is required including a PPD at baseline and must alert us or the primary physician if symptoms of infection or other concerning signs are noted. There is also a special program designed to monitor depression which is required with Siliq. Ilumya Counseling: I discussed with the patient the risks of tildrakizumab including but not limited to immunosuppression, malignancy, posterior leukoencephalopathy syndrome, and serious infections.  The patient understands that monitoring is required including a PPD at baseline and must alert us or the primary physician if symptoms of infection or other concerning signs are noted. Aklief Pregnancy And Lactation Text: It is unknown if this medication is safe to use during pregnancy.  It is unknown if this medication is excreted in breast milk.  Breastfeeding women should use the topical cream on the smallest area of the skin for the shortest time needed while breastfeeding.  Do not apply to nipple and areola. Hydroxychloroquine Pregnancy And Lactation Text: This medication has been shown to cause fetal harm but it isn't assigned a Pregnancy Risk Category. There are small amounts excreted in breast milk. Azelaic Acid Counseling: Patient counseled that medicine may cause skin irritation and to avoid applying near the eyes.  In the event of skin irritation, the patient was advised to reduce the amount of the drug applied or use it less frequently.   The patient verbalized understanding of the proper use and possible adverse effects of azelaic acid.  All of the patient's questions and concerns were addressed. Tremfya Counseling: I discussed with the patient the risks of guselkumab including but not limited to immunosuppression, serious infections, worsening of inflammatory bowel disease and drug reactions.  The patient understands that monitoring is required including a PPD at baseline and must alert us or the primary physician if symptoms of infection or other concerning signs are noted. Gabapentin Pregnancy And Lactation Text: This medication is Pregnancy Category C and isn't considered safe during pregnancy. It is excreted in breast milk. Clofazimine Counseling:  I discussed with the patient the risks of clofazimine including but not limited to skin and eye pigmentation, liver damage, nausea/vomiting, gastrointestinal bleeding and allergy. SSKI Counseling:  I discussed with the patient the risks of SSKI including but not limited to thyroid abnormalities, metallic taste, GI upset, fever, headache, acne, arthralgias, paraesthesias, lymphadenopathy, easy bleeding, arrhythmias, and allergic reaction. Adbry Counseling: I discussed with the patient the risks of tralokinumab including but not limited to eye infection and irritation, cold sores, injection site reactions, worsening of asthma, allergic reactions and increased risk of parasitic infection.  Live vaccines should be avoided while taking tralokinumab. The patient understands that monitoring is required and they must alert us or the primary physician if symptoms of infection or other concerning signs are noted. Topical Retinoid counseling:  Patient advised to apply a pea-sized amount only at bedtime and wait 30 minutes after washing their face before applying.  If too drying, patient may add a non-comedogenic moisturizer. The patient verbalized understanding of the proper use and possible adverse effects of retinoids.  All of the patient's questions and concerns were addressed. Oxybutynin Counseling:  I discussed with the patient the risks of oxybutynin including but not limited to skin rash, drowsiness, dry mouth, difficulty urinating, and blurred vision. Oral Minoxidil Pregnancy And Lactation Text: This medication should only be used when clearly needed if you are pregnant, attempting to become pregnant or breast feeding. Dupixent Counseling: I discussed with the patient the risks of dupilumab including but not limited to eye infection and irritation, cold sores, injection site reactions, worsening of asthma, allergic reactions and increased risk of parasitic infection.  Live vaccines should be avoided while taking dupilumab. Dupilumab will also interact with certain medications such as warfarin and cyclosporine. The patient understands that monitoring is required and they must alert us or the primary physician if symptoms of infection or other concerning signs are noted. Solaraze Pregnancy And Lactation Text: This medication is Pregnancy Category B and is considered safe. There is some data to suggest avoiding during the third trimester. It is unknown if this medication is excreted in breast milk. Topical Metronidazole Pregnancy And Lactation Text: This medication is Pregnancy Category B and considered safe during pregnancy.  It is also considered safe to use while breastfeeding. Finasteride Male Counseling: Finasteride Counseling:  I discussed with the patient the risks of use of finasteride including but not limited to decreased libido, decreased ejaculate volume, gynecomastia, and depression. Women should not handle medication.  All of the patient's questions and concerns were addressed. Solaraze Counseling:  I discussed with the patient the risks of Solaraze including but not limited to erythema, scaling, itching, weeping, crusting, and pain. Niacinamide Pregnancy And Lactation Text: These medications are considered safe during pregnancy. Topical Sulfur Applications Counseling: Topical Sulfur Counseling: Patient counseled that this medication may cause skin irritation or allergic reactions.  In the event of skin irritation, the patient was advised to reduce the amount of the drug applied or use it less frequently.   The patient verbalized understanding of the proper use and possible adverse effects of topical sulfur application.  All of the patient's questions and concerns were addressed. Dutasteride Pregnancy And Lactation Text: This medication is absolutely contraindicated in women, especially during pregnancy and breast feeding. Feminization of male fetuses is possible if taking while pregnant. Albendazole Counseling:  I discussed with the patient the risks of albendazole including but not limited to cytopenia, kidney damage, nausea/vomiting and severe allergy.  The patient understands that this medication is being used in an off-label manner. Dutasteride Male Counseling: Dustasteride Counseling:  I discussed with the patient the risks of use of dutasteride including but not limited to decreased libido, decreased ejaculate volume, and gynecomastia. Women who can become pregnant should not handle medication.  All of the patient's questions and concerns were addressed. Use Enhanced Medication Counseling?: No Cyclosporine Counseling:  I discussed with the patient the risks of cyclosporine including but not limited to hypertension, gingival hyperplasia,myelosuppression, immunosuppression, liver damage, kidney damage, neurotoxicity, lymphoma, and serious infections. The patient understands that monitoring is required including baseline blood pressure, CBC, CMP, lipid panel and uric acid, and then 1-2 times monthly CMP and blood pressure. Protopic Counseling: Patient may experience a mild burning sensation during topical application. Protopic is not approved in children less than 2 years of age. There have been case reports of hematologic and skin malignancies in patients using topical calcineurin inhibitors although causality is questionable. Finasteride Pregnancy And Lactation Text: This medication is absolutely contraindicated during pregnancy. It is unknown if it is excreted in breast milk. Rituxan Counseling:  I discussed with the patient the risks of Rituxan infusions. Side effects can include infusion reactions, severe drug rashes including mucocutaneous reactions, reactivation of latent hepatitis and other infections and rarely progressive multifocal leukoencephalopathy.  All of the patient's questions and concerns were addressed. Griseofulvin Pregnancy And Lactation Text: This medication is Pregnancy Category X and is known to cause serious birth defects. It is unknown if this medication is excreted in breast milk but breast feeding should be avoided. Opioid Pregnancy And Lactation Text: These medications can lead to premature delivery and should be avoided during pregnancy. These medications are also present in breast milk in small amounts. Cellcept Counseling:  I discussed with the patient the risks of mycophenolate mofetil including but not limited to infection/immunosuppression, GI upset, hypokalemia, hypercholesterolemia, bone marrow suppression, lymphoproliferative disorders, malignancy, GI ulceration/bleed/perforation, colitis, interstitial lung disease, kidney failure, progressive multifocal leukoencephalopathy, and birth defects.  The patient understands that monitoring is required including a baseline creatinine and regular CBC testing. In addition, patient must alert us immediately if symptoms of infection or other concerning signs are noted. Cimetidine Counseling:  I discussed with the patient the risks of Cimetidine including but not limited to gynecomastia, headache, diarrhea, nausea, drowsiness, arrhythmias, pancreatitis, skin rashes, psychosis, bone marrow suppression and kidney toxicity. Doxycycline Pregnancy And Lactation Text: This medication is Pregnancy Category D and not consider safe during pregnancy. It is also excreted in breast milk but is considered safe for shorter treatment courses. Doxycycline Counseling:  Patient counseled regarding possible photosensitivity and increased risk for sunburn.  Patient instructed to avoid sunlight, if possible.  When exposed to sunlight, patients should wear protective clothing, sunglasses, and sunscreen.  The patient was instructed to call the office immediately if the following severe adverse effects occur:  hearing changes, easy bruising/bleeding, severe headache, or vision changes.  The patient verbalized understanding of the proper use and possible adverse effects of doxycycline.  All of the patient's questions and concerns were addressed. Rhofade Counseling: Rhofade is a topical medication which can decrease superficial blood flow where applied. Side effects are uncommon and include stinging, redness and allergic reactions. Winlevi Pregnancy And Lactation Text: This medication is considered safe during pregnancy and breastfeeding. Drysol Pregnancy And Lactation Text: This medication is considered safe during pregnancy and breast feeding. Acitretin Pregnancy And Lactation Text: This medication is Pregnancy Category X and should not be given to women who are pregnant or may become pregnant in the future. This medication is excreted in breast milk. Topical Steroids Counseling: I discussed with the patient that prolonged use of topical steroids can result in the increased appearance of superficial blood vessels (telangiectasias), lightening (hypopigmentation) and thinning of the skin (atrophy).  Patient understands to avoid using high potency steroids in skin folds, the groin or the face.  The patient verbalized understanding of the proper use and possible adverse effects of topical steroids.  All of the patient's questions and concerns were addressed. Birth Control Pills Counseling: Birth Control Pill Counseling: I discussed with the patient the potential side effects of OCPs including but not limited to increased risk of stroke, heart attack, thrombophlebitis, deep venous thrombosis, hepatic adenomas, breast changes, GI upset, headaches, and depression.  The patient verbalized understanding of the proper use and possible adverse effects of OCPs. All of the patient's questions and concerns were addressed. Benzoyl Peroxide Pregnancy And Lactation Text: This medication is Pregnancy Category C. It is unknown if benzoyl peroxide is excreted in breast milk. Rinvoq Counseling: I discussed with the patient the risks of Rinvoq therapy including but not limited to upper respiratory tract infections, shingles, cold sores, bronchitis, nausea, cough, fever, acne, and headache. Live vaccines should be avoided.  This medication has been linked to serious infections; higher rate of mortality; malignancy and lymphoproliferative disorders; major adverse cardiovascular events; thrombosis; thrombocytopenia, anemia, and neutropenia; lipid elevations; liver enzyme elevations; and gastrointestinal perforations. Libtayo Counseling- I discussed with the patient the risks of Libtayo including but not limited to nausea, vomiting, diarrhea, and bone or muscle pain.  The patient verbalized understanding of the proper use and possible adverse effects of Libtayo.  All of the patient's questions and concerns were addressed. Thalidomide Pregnancy And Lactation Text: This medication is Pregnancy Category X and is absolutely contraindicated during pregnancy. It is unknown if it is excreted in breast milk. Cibinqo Counseling: I discussed with the patient the risks of Cibinqo therapy including but not limited to common cold, nausea, headache, cold sores, increased blood CPK levels, dizziness, UTIs, fatigue, acne, and vomitting. Live vaccines should be avoided.  This medication has been linked to serious infections; higher rate of mortality; malignancy and lymphoproliferative disorders; major adverse cardiovascular events; thrombosis; thrombocytopenia and lymphopenia; lipid elevations; and retinal detachment. Ivermectin Counseling:  Patient instructed to take medication on an empty stomach with a full glass of water.  Patient informed of potential adverse effects including but not limited to nausea, diarrhea, dizziness, itching, and swelling of the extremities or lymph nodes.  The patient verbalized understanding of the proper use and possible adverse effects of ivermectin.  All of the patient's questions and concerns were addressed. Valtrex Pregnancy And Lactation Text: this medication is Pregnancy Category B and is considered safe during pregnancy. This medication is not directly found in breast milk but it's metabolite acyclovir is present. Dupixent Pregnancy And Lactation Text: This medication likely crosses the placenta but the risk for the fetus is uncertain. This medication is excreted in breast milk. Xolair Pregnancy And Lactation Text: This medication is Pregnancy Category B and is considered safe during pregnancy. This medication is excreted in breast milk. Acitretin Counseling:  I discussed with the patient the risks of acitretin including but not limited to hair loss, dry lips/skin/eyes, liver damage, hyperlipidemia, depression/suicidal ideation, photosensitivity.  Serious rare side effects can include but are not limited to pancreatitis, pseudotumor cerebri, bony changes, clot formation/stroke/heart attack.  Patient understands that alcohol is contraindicated since it can result in liver toxicity and significantly prolong the elimination of the drug by many years. Valtrex Counseling: I discussed with the patient the risks of valacyclovir including but not limited to kidney damage, nausea, vomiting and severe allergy.  The patient understands that if the infection seems to be worsening or is not improving, they are to call. Calcipotriene Pregnancy And Lactation Text: The use of this medication during pregnancy or lactation is not recommended as there is insufficient data. Clindamycin Counseling: I counseled the patient regarding use of clindamycin as an antibiotic for prophylactic and/or therapeutic purposes. Clindamycin is active against numerous classes of bacteria, including skin bacteria. Side effects may include nausea, diarrhea, gastrointestinal upset, rash, hives, yeast infections, and in rare cases, colitis. Sarecycline Pregnancy And Lactation Text: This medication is Pregnancy Category D and not consider safe during pregnancy. It is also excreted in breast milk. Taltz Counseling: I discussed with the patient the risks of ixekizumab including but not limited to immunosuppression, serious infections, worsening of inflammatory bowel disease and drug reactions.  The patient understands that monitoring is required including a PPD at baseline and must alert us or the primary physician if symptoms of infection or other concerning signs are noted. VTAMA Counseling: I discussed with the patient that VTAMA is not for use in the eyes, mouth or mouth. They should call the office if they develop any signs of allergic reactions to VTAMA. The patient verbalized understanding of the proper use and possible adverse effects of VTAMA.  All of the patient's questions and concerns were addressed. 5-Fu Pregnancy And Lactation Text: This medication is Pregnancy Category X and contraindicated in pregnancy and in women who may become pregnant. It is unknown if this medication is excreted in breast milk. Bexarotene Pregnancy And Lactation Text: This medication is Pregnancy Category X and should not be given to women who are pregnant or may become pregnant. This medication should not be used if you are breast feeding. Wartpeel Counseling:  I discussed with the patient the risks of Wartpeel including but not limited to erythema, scaling, itching, weeping, crusting, and pain. Nsaids Counseling: NSAID Counseling: I discussed with the patient that NSAIDs should be taken with food. Prolonged use of NSAIDs can result in the development of stomach ulcers.  Patient advised to stop taking NSAIDs if abdominal pain occurs.  The patient verbalized understanding of the proper use and possible adverse effects of NSAIDs.  All of the patient's questions and concerns were addressed. Protopic Pregnancy And Lactation Text: This medication is Pregnancy Category C. It is unknown if this medication is excreted in breast milk when applied topically. Cibinqo Pregnancy And Lactation Text: It is unknown if this medication will adversely affect pregnancy or breast feeding.  You should not take this medication if you are currently pregnant or planning a pregnancy or while breastfeeding. Imiquimod Counseling:  I discussed with the patient the risks of imiquimod including but not limited to erythema, scaling, itching, weeping, crusting, and pain.  Patient understands that the inflammatory response to imiquimod is variable from person to person and was educated regarded proper titration schedule.  If flu-like symptoms develop, patient knows to discontinue the medication and contact us. Tazorac Pregnancy And Lactation Text: This medication is not safe during pregnancy. It is unknown if this medication is excreted in breast milk. Rhofade Pregnancy And Lactation Text: This medication has not been assigned a Pregnancy Risk Category. It is unknown if the medication is excreted in breast milk. Isotretinoin Counseling: Patient should get monthly blood tests, not donate blood, not drive at night if vision affected, not share medication, and not undergo elective surgery for 6 months after tx completed. Side effects reviewed, pt to contact office should one occur. Thalidomide Counseling: I discussed with the patient the risks of thalidomide including but not limited to birth defects, anxiety, weakness, chest pain, dizziness, cough and severe allergy. Sotyktu Counseling:  I discussed the most common side effects of Sotyktu including: common cold, sore throat, sinus infections, cold sores, canker sores, folliculitis, and acne.? I also discussed more serious side effects of Sotyktu including but not limited to: serious allergic reactions; increased risk for infections such as TB; cancers such as lymphomas; rhabdomyolysis and elevated CPK; and elevated triglycerides and liver enzymes.? Xolair Counseling:  Patient informed of potential adverse effects including but not limited to fever, muscle aches, rash and allergic reactions.  The patient verbalized understanding of the proper use and possible adverse effects of Xolair.  All of the patient's questions and concerns were addressed. Prednisone Counseling:  I discussed with the patient the risks of prolonged use of prednisone including but not limited to weight gain, insomnia, osteoporosis, mood changes, diabetes, susceptibility to infection, glaucoma and high blood pressure.  In cases where prednisone use is prolonged, patients should be monitored with blood pressure checks, serum glucose levels and an eye exam.  Additionally, the patient may need to be placed on GI prophylaxis, PCP prophylaxis, and calcium and vitamin D supplementation and/or a bisphosphonate.  The patient verbalized understanding of the proper use and the possible adverse effects of prednisone.  All of the patient's questions and concerns were addressed. Minoxidil Counseling: Minoxidil is a topical medication which can increase blood flow where it is applied. It is uncertain how this medication increases hair growth. Side effects are uncommon and include stinging and allergic reactions. Soolantra Counseling: I discussed with the patients the risks of topial Soolantra. This is a medicine which decreases the number of mites and inflammation in the skin. You experience burning, stinging, eye irritation or allergic reactions.  Please call our office if you develop any problems from using this medication. Otezla Pregnancy And Lactation Text: This medication is Pregnancy Category C and it isn't known if it is safe during pregnancy. It is unknown if it is excreted in breast milk. Gabapentin Counseling: I discussed with the patient the risks of gabapentin including but not limited to dizziness, somnolence, fatigue and ataxia. Drysol Counseling:  I discussed with the patient the risks of drysol/aluminum chloride including but not limited to skin rash, itching, irritation, burning. Sarecycline Counseling: Patient advised regarding possible photosensitivity and discoloration of the teeth, skin, lips, tongue and gums.  Patient instructed to avoid sunlight, if possible.  When exposed to sunlight, patients should wear protective clothing, sunglasses, and sunscreen.  The patient was instructed to call the office immediately if the following severe adverse effects occur:  hearing changes, easy bruising/bleeding, severe headache, or vision changes.  The patient verbalized understanding of the proper use and possible adverse effects of sarecycline.  All of the patient's questions and concerns were addressed. Hydroxyzine Counseling: Patient advised that the medication is sedating and not to drive a car after taking this medication.  Patient informed of potential adverse effects including but not limited to dry mouth, urinary retention, and blurry vision.  The patient verbalized understanding of the proper use and possible adverse effects of hydroxyzine.  All of the patient's questions and concerns were addressed. Fluconazole Counseling:  Patient counseled regarding adverse effects of fluconazole including but not limited to headache, diarrhea, nausea, upset stomach, liver function test abnormalities, taste disturbance, and stomach pain.  There is a rare possibility of liver failure that can occur when taking fluconazole.  The patient understands that monitoring of LFTs and kidney function test may be required, especially at baseline. The patient verbalized understanding of the proper use and possible adverse effects of fluconazole.  All of the patient's questions and concerns were addressed. Terbinafine Counseling: Patient counseling regarding adverse effects of terbinafine including but not limited to headache, diarrhea, rash, upset stomach, liver function test abnormalities, itching, taste/smell disturbance, nausea, abdominal pain, and flatulence.  There is a rare possibility of liver failure that can occur when taking terbinafine.  The patient understands that a baseline LFT and kidney function test may be required. The patient verbalized understanding of the proper use and possible adverse effects of terbinafine.  All of the patient's questions and concerns were addressed. Carac Counseling:  I discussed with the patient the risks of Carac including but not limited to erythema, scaling, itching, weeping, crusting, and pain. Itraconazole Counseling:  I discussed with the patient the risks of itraconazole including but not limited to liver damage, nausea/vomiting, neuropathy, and severe allergy.  The patient understands that this medication is best absorbed when taken with acidic beverages such as non-diet cola or ginger ale.  The patient understands that monitoring is required including baseline LFTs and repeat LFTs at intervals.  The patient understands that they are to contact us or the primary physician if concerning signs are noted. Cimzia Pregnancy And Lactation Text: This medication crosses the placenta but can be considered safe in certain situations. Cimzia may be excreted in breast milk. Rifampin Pregnancy And Lactation Text: This medication is Pregnancy Category C and it isn't know if it is safe during pregnancy. It is also excreted in breast milk and should not be used if you are breast feeding. Cantharidin Counseling:  I discussed with the patient the risks of Cantharidin including but not limited to pain, redness, burning, itching, and blistering. Simponi Counseling:  I discussed with the patient the risks of golimumab including but not limited to myelosuppression, immunosuppression, autoimmune hepatitis, demyelinating diseases, lymphoma, and serious infections.  The patient understands that monitoring is required including a PPD at baseline and must alert us or the primary physician if symptoms of infection or other concerning signs are noted. Doxepin Counseling:  Patient advised that the medication is sedating and not to drive a car after taking this medication. Patient informed of potential adverse effects including but not limited to dry mouth, urinary retention, and blurry vision.  The patient verbalized understanding of the proper use and possible adverse effects of doxepin.  All of the patient's questions and concerns were addressed. Soolantra Pregnancy And Lactation Text: This medication is Pregnancy Category C. This medication is considered safe during breast feeding. Infliximab Counseling:  I discussed with the patient the risks of infliximab including but not limited to myelosuppression, immunosuppression, autoimmune hepatitis, demyelinating diseases, lymphoma, and serious infections.  The patient understands that monitoring is required including a PPD at baseline and must alert us or the primary physician if symptoms of infection or other concerning signs are noted. Adbry Pregnancy And Lactation Text: It is unknown if this medication will adversely affect pregnancy or breast feeding. Klisyri Pregnancy And Lactation Text: It is unknown if this medication can harm a developing fetus or if it is excreted in breast milk. Colchicine Counseling:  Patient counseled regarding adverse effects including but not limited to stomach upset (nausea, vomiting, stomach pain, or diarrhea).  Patient instructed to limit alcohol consumption while taking this medication.  Colchicine may reduce blood counts especially with prolonged use.  The patient understands that monitoring of kidney function and blood counts may be required, especially at baseline. The patient verbalized understanding of the proper use and possible adverse effects of colchicine.  All of the patient's questions and concerns were addressed. Opzelura Counseling:  I discussed with the patient the risks of Opzelura including but not limited to nasopharngitis, bronchitis, ear infection, eosinophila, hives, diarrhea, folliculitis, tonsillitis, and rhinorrhea.  Taken orally, this medication has been linked to serious infections; higher rate of mortality; malignancy and lymphoproliferative disorders; major adverse cardiovascular events; thrombosis; thrombocytopenia, anemia, and neutropenia; and lipid elevations. Enbrel Counseling:  I discussed with the patient the risks of etanercept including but not limited to myelosuppression, immunosuppression, autoimmune hepatitis, demyelinating diseases, lymphoma, and infections.  The patient understands that monitoring is required including a PPD at baseline and must alert us or the primary physician if symptoms of infection or other concerning signs are noted. Zyclara Counseling:  I discussed with the patient the risks of imiquimod including but not limited to erythema, scaling, itching, weeping, crusting, and pain.  Patient understands that the inflammatory response to imiquimod is variable from person to person and was educated regarded proper titration schedule.  If flu-like symptoms develop, patient knows to discontinue the medication and contact us. Zoryve Counseling:  I discussed with the patient that Zoryve is not for use in the eyes, mouth or vagina. The most commonly reported side effects include diarrhea, headache, insomnia, application site pain, upper respiratory tract infections, and urinary tract infections.  All of the patient's questions and concerns were addressed. Benzoyl Peroxide Counseling: Patient counseled that medicine may cause skin irritation and bleach clothing.  In the event of skin irritation, the patient was advised to reduce the amount of the drug applied or use it less frequently.   The patient verbalized understanding of the proper use and possible adverse effects of benzoyl peroxide.  All of the patient's questions and concerns were addressed. 5-Fu Counseling: 5-Fluorouracil Counseling:  I discussed with the patient the risks of 5-fluorouracil including but not limited to erythema, scaling, itching, weeping, crusting, and pain. Olanzapine Counseling- I discussed with the patient the common side effects of olanzapine including but are not limited to: lack of energy, dry mouth, increased appetite, sleepiness, tremor, constipation, dizziness, changes in behavior, or restlessness.  Explained that teenagers are more likely to experience headaches, abdominal pain, pain in the arms or legs, tiredness, and sleepiness.  Serious side effects include but are not limited: increased risk of death in elderly patients who are confused, have memory loss, or dementia-related psychosis; hyperglycemia; increased cholesterol and triglycerides; and weight gain. Metronidazole Pregnancy And Lactation Text: This medication is Pregnancy Category B and considered safe during pregnancy.  It is also excreted in breast milk. Aklief counseling:  Patient advised to apply a pea-sized amount only at bedtime and wait 30 minutes after washing their face before applying.  If too drying, patient may add a non-comedogenic moisturizer.  The most commonly reported side effects including irritation, redness, scaling, dryness, stinging, burning, itching, and increased risk of sunburn.  The patient verbalized understanding of the proper use and possible adverse effects of retinoids.  All of the patient's questions and concerns were addressed. Xelpreciousz Pregnancy And Lactation Text: This medication is Pregnancy Category D and is not considered safe during pregnancy.  The risk during breast feeding is also uncertain. Dapsone Pregnancy And Lactation Text: This medication is Pregnancy Category C and is not considered safe during pregnancy or breast feeding. Methotrexate Pregnancy And Lactation Text: This medication is Pregnancy Category X and is known to cause fetal harm. This medication is excreted in breast milk. Ketoconazole Counseling:   Patient counseled regarding improving absorption with orange juice.  Adverse effects include but are not limited to breast enlargement, headache, diarrhea, nausea, upset stomach, liver function test abnormalities, taste disturbance, and stomach pain.  There is a rare possibility of liver failure that can occur when taking ketoconazole. The patient understands that monitoring of LFTs may be required, especially at baseline. The patient verbalized understanding of the proper use and possible adverse effects of ketoconazole.  All of the patient's questions and concerns were addressed. Calcipotriene Counseling:  I discussed with the patient the risks of calcipotriene including but not limited to erythema, scaling, itching, and irritation. Otezla Counseling: The side effects of Otezla were discussed with the patient, including but not limited to worsening or new depression, weight loss, diarrhea, nausea, upper respiratory tract infection, and headache. Patient instructed to call the office should any adverse effect occur.  The patient verbalized understanding of the proper use and possible adverse effects of Otezla.  All the patient's questions and concerns were addressed. Cosentyx Counseling:  I discussed with the patient the risks of Cosentyx including but not limited to worsening of Crohn's disease, immunosuppression, allergic reactions and infections.  The patient understands that monitoring is required including a PPD at baseline and must alert us or the primary physician if symptoms of infection or other concerning signs are noted. Qbrexza Counseling:  I discussed with the patient the risks of Qbrexza including but not limited to headache, mydriasis, blurred vision, dry eyes, nasal dryness, dry mouth, dry throat, dry skin, urinary hesitation, and constipation.  Local skin reactions including erythema, burning, stinging, and itching can also occur. Azithromycin Counseling:  I discussed with the patient the risks of azithromycin including but not limited to GI upset, allergic reaction, drug rash, diarrhea, and yeast infections. Tazorac Counseling:  Patient advised that medication is irritating and drying.  Patient may need to apply sparingly and wash off after an hour before eventually leaving it on overnight.  The patient verbalized understanding of the proper use and possible adverse effects of tazorac.  All of the patient's questions and concerns were addressed. Rifampin Counseling: I discussed with the patient the risks of rifampin including but not limited to liver damage, kidney damage, red-orange body fluids, nausea/vomiting and severe allergy. Oral Minoxidil Counseling- I discussed with the patient the risks of oral minoxidil including but not limited to shortness of breath, swelling of the feet or ankles, dizziness, lightheadedness, unwanted hair growth and allergic reaction.  The patient verbalized understanding of the proper use and possible adverse effects of oral minoxidil.  All of the patient's questions and concerns were addressed. Picato Counseling:  I discussed with the patient the risks of Picato including but not limited to erythema, scaling, itching, weeping, crusting, and pain. Tranexamic Acid Pregnancy And Lactation Text: It is unknown if this medication is safe during pregnancy or breast feeding. Glycopyrrolate Pregnancy And Lactation Text: This medication is Pregnancy Category B and is considered safe during pregnancy. It is unknown if it is excreted breast milk. Topical Clindamycin Counseling: Patient counseled that this medication may cause skin irritation or allergic reactions.  In the event of skin irritation, the patient was advised to reduce the amount of the drug applied or use it less frequently.   The patient verbalized understanding of the proper use and possible adverse effects of clindamycin.  All of the patient's questions and concerns were addressed. Elidel Counseling: Patient may experience a mild burning sensation during topical application. Elidel is not approved in children less than 2 years of age. There have been case reports of hematologic and skin malignancies in patients using topical calcineurin inhibitors although causality is questionable. Topical Steroids Applications Pregnancy And Lactation Text: Most topical steroids are considered safe to use during pregnancy and lactation.  Any topical steroid applied to the breast or nipple should be washed off before breastfeeding. Detail Level: Simple Cellcept Pregnancy And Lactation Text: This medication is Pregnancy Category D and isn't considered safe during pregnancy. It is unknown if this medication is excreted in breast milk. Stelara Counseling:  I discussed with the patient the risks of ustekinumab including but not limited to immunosuppression, malignancy, posterior leukoencephalopathy syndrome, and serious infections.  The patient understands that monitoring is required including a PPD at baseline and must alert us or the primary physician if symptoms of infection or other concerning signs are noted. Low Dose Naltrexone Counseling- I discussed with the patient the potential risks and side effects of low dose naltrexone including but not limited to: more vivid dreams, headaches, nausea, vomiting, abdominal pain, fatigue, dizziness, and anxiety. Hydroxyzine Pregnancy And Lactation Text: This medication is not safe during pregnancy and should not be taken. It is also excreted in breast milk and breast feeding isn't recommended. Topical Sulfur Applications Pregnancy And Lactation Text: This medication is Pregnancy Category C and has an unknown safety profile during pregnancy. It is unknown if this topical medication is excreted in breast milk. Opzelura Pregnancy And Lactation Text: There is insufficient data to evaluate drug-associated risk for major birth defects, miscarriage, or other adverse maternal or fetal outcomes.  There is a pregnancy registry that monitors pregnancy outcomes in pregnant persons exposed to the medication during pregnancy.  It is unknown if this medication is excreted in breast milk.  Do not breastfeed during treatment and for about 4 weeks after the last dose. Spironolactone Counseling: Patient advised regarding risks of diarrhea, abdominal pain, hyperkalemia, birth defects (for female patients), liver toxicity and renal toxicity. The patient may need blood work to monitor liver and kidney function and potassium levels while on therapy. The patient verbalized understanding of the proper use and possible adverse effects of spironolactone.  All of the patient's questions and concerns were addressed. Azithromycin Pregnancy And Lactation Text: This medication is considered safe during pregnancy and is also secreted in breast milk. Niacinamide Counseling: I recommended taking niacin or niacinamide, also know as vitamin B3, twice daily. Recent evidence suggests that taking vitamin B3 (500 mg twice daily) can reduce the risk of actinic keratoses and non-melanoma skin cancers. Side effects of vitamin B3 include flushing and headache. Bactrim Counseling:  I discussed with the patient the risks of sulfa antibiotics including but not limited to GI upset, allergic reaction, drug rash, diarrhea, dizziness, photosensitivity, and yeast infections.  Rarely, more serious reactions can occur including but not limited to aplastic anemia, agranulocytosis, methemoglobinemia, blood dyscrasias, liver or kidney failure, lung infiltrates or desquamative/blistering drug rashes. Cyclophosphamide Counseling:  I discussed with the patient the risks of cyclophosphamide including but not limited to hair loss, hormonal abnormalities, decreased fertility, abdominal pain, diarrhea, nausea and vomiting, bone marrow suppression and infection. The patient understands that monitoring is required while taking this medication. Cantharidin Pregnancy And Lactation Text: This medication has not been proven safe during pregnancy. It is unknown if this medication is excreted in breast milk. Ketoconazole Pregnancy And Lactation Text: This medication is Pregnancy Category C and it isn't know if it is safe during pregnancy. It is also excreted in breast milk and breast feeding isn't recommended. Cimzia Counseling:  I discussed with the patient the risks of Cimzia including but not limited to immunosuppression, allergic reactions and infections.  The patient understands that monitoring is required including a PPD at baseline and must alert us or the primary physician if symptoms of infection or other concerning signs are noted. Winlevi Counseling:  I discussed with the patient the risks of topical clascoterone including but not limited to erythema, scaling, itching, and stinging. Patient voiced their understanding. Azathioprine Counseling:  I discussed with the patient the risks of azathioprine including but not limited to myelosuppression, immunosuppression, hepatotoxicity, lymphoma, and infections.  The patient understands that monitoring is required including baseline LFTs, Creatinine, possible TPMP genotyping and weekly CBCs for the first month and then every 2 weeks thereafter.  The patient verbalized understanding of the proper use and possible adverse effects of azathioprine.  All of the patient's questions and concerns were addressed. Low Dose Naltrexone Pregnancy And Lactation Text: Naltrexone is pregnancy category C.  There have been no adequate and well-controlled studies in pregnant women.  It should be used in pregnancy only if the potential benefit justifies the potential risk to the fetus.   Limited data indicates that naltrexone is minimally excreted into breastmilk. Birth Control Pills Pregnancy And Lactation Text: This medication should be avoided if pregnant and for the first 30 days post-partum. Opioid Counseling: I discussed with the patient the potential side effects of opioids including but not limited to addiction, altered mental status, and depression. I stressed avoiding alcohol, benzodiazepines, muscle relaxants and sleep aids unless specifically okayed by a physician. The patient verbalized understanding of the proper use and possible adverse effects of opioids. All of the patient's questions and concerns were addressed. They were instructed to flush the remaining pills down the toilet if they did not need them for pain. Rituxan Pregnancy And Lactation Text: This medication is Pregnancy Category C and it isn't know if it is safe during pregnancy. It is unknown if this medication is excreted in breast milk but similar antibodies are known to be excreted. Glycopyrrolate Counseling:  I discussed with the patient the risks of glycopyrrolate including but not limited to skin rash, drowsiness, dry mouth, difficulty urinating, and blurred vision. Erythromycin Pregnancy And Lactation Text: This medication is Pregnancy Category B and is considered safe during pregnancy. It is also excreted in breast milk. Bactrim Pregnancy And Lactation Text: This medication is Pregnancy Category D and is known to cause fetal risk.  It is also excreted in breast milk. Mirvaso Counseling: Mirvaso is a topical medication which can decrease superficial blood flow where applied. Side effects are uncommon and include stinging, redness and allergic reactions. Albendazole Pregnancy And Lactation Text: This medication is Pregnancy Category C and it isn't known if it is safe during pregnancy. It is also excreted in breast milk. Tranexamic Acid Counseling:  Patient advised of the small risk of bleeding problems with tranexamic acid. They were also instructed to call if they developed any nausea, vomiting or diarrhea. All of the patient's questions and concerns were addressed. Bexarotene Counseling:  I discussed with the patient the risks of bexarotene including but not limited to hair loss, dry lips/skin/eyes, liver abnormalities, hyperlipidemia, pancreatitis, depression/suicidal ideation, photosensitivity, drug rash/allergic reactions, hypothyroidism, anemia, leukopenia, infection, cataracts, and teratogenicity.  Patient understands that they will need regular blood tests to check lipid profile, liver function tests, white blood cell count, thyroid function tests and pregnancy test if applicable. Griseofulvin Counseling:  I discussed with the patient the risks of griseofulvin including but not limited to photosensitivity, cytopenia, liver damage, nausea/vomiting and severe allergy.  The patient understands that this medication is best absorbed when taken with a fatty meal (e.g., ice cream or french fries). Qbrexza Pregnancy And Lactation Text: There is no available data on Qbrexza use in pregnant women.  There is no available data on Qbrexza use in lactation. Quinolones Counseling:  I discussed with the patient the risks of fluoroquinolones including but not limited to GI upset, allergic reaction, drug rash, diarrhea, dizziness, photosensitivity, yeast infections, liver function test abnormalities, tendonitis/tendon rupture. Hydroxychloroquine Counseling:  I discussed with the patient that a baseline ophthalmologic exam is needed at the start of therapy and every year thereafter while on therapy. A CBC may also be warranted for monitoring.  The side effects of this medication were discussed with the patient, including but not limited to agranulocytosis, aplastic anemia, seizures, rashes, retinopathy, and liver toxicity. Patient instructed to call the office should any adverse effect occur.  The patient verbalized understanding of the proper use and possible adverse effects of Plaquenil.  All the patient's questions and concerns were addressed. Xeljanz Counseling: I discussed with the patient the risks of Xeljanz therapy including increased risk of infection, liver issues, headache, diarrhea, or cold symptoms. Live vaccines should be avoided. They were instructed to call if they have any problems. Spironolactone Pregnancy And Lactation Text: This medication can cause feminization of the male fetus and should be avoided during pregnancy. The active metabolite is also found in breast milk. Odomzo Counseling- I discussed with the patient the risks of Odomzo including but not limited to nausea, vomiting, diarrhea, constipation, weight loss, changes in the sense of taste, decreased appetite, muscle spasms, and hair loss.  The patient verbalized understanding of the proper use and possible adverse effects of Odomzo.  All of the patient's questions and concerns were addressed. Hydroquinone Counseling:  Patient advised that medication may result in skin irritation, lightening (hypopigmentation), dryness, and burning.  In the event of skin irritation, the patient was advised to reduce the amount of the drug applied or use it less frequently.  Rarely, spots that are treated with hydroquinone can become darker (pseudoochronosis).  Should this occur, patient instructed to stop medication and call the office. The patient verbalized understanding of the proper use and possible adverse effects of hydroquinone.  All of the patient's questions and concerns were addressed. Clindamycin Pregnancy And Lactation Text: This medication can be used in pregnancy if certain situations. Clindamycin is also present in breast milk. Minocycline Counseling: Patient advised regarding possible photosensitivity and discoloration of the teeth, skin, lips, tongue and gums.  Patient instructed to avoid sunlight, if possible.  When exposed to sunlight, patients should wear protective clothing, sunglasses, and sunscreen.  The patient was instructed to call the office immediately if the following severe adverse effects occur:  hearing changes, easy bruising/bleeding, severe headache, or vision changes.  The patient verbalized understanding of the proper use and possible adverse effects of minocycline.  All of the patient's questions and concerns were addressed. Topical Ketoconazole Counseling: Patient counseled that this medication may cause skin irritation or allergic reactions.  In the event of skin irritation, the patient was advised to reduce the amount of the drug applied or use it less frequently.   The patient verbalized understanding of the proper use and possible adverse effects of ketoconazole.  All of the patient's questions and concerns were addressed. Sotyktu Pregnancy And Lactation Text: There is insufficient data to evaluate whether or not Sotyktu is safe to use during pregnancy.? ?It is not known if Sotyktu passes into breast milk and whether or not it is safe to use when breastfeeding.?? Cyclophosphamide Pregnancy And Lactation Text: This medication is Pregnancy Category D and it isn't considered safe during pregnancy. This medication is excreted in breast milk. Sski Pregnancy And Lactation Text: This medication is Pregnancy Category D and isn't considered safe during pregnancy. It is excreted in breast milk. Cephalexin Counseling: I counseled the patient regarding use of cephalexin as an antibiotic for prophylactic and/or therapeutic purposes. Cephalexin (commonly prescribed under brand name Keflex) is a cephalosporin antibiotic which is active against numerous classes of bacteria, including most skin bacteria. Side effects may include nausea, diarrhea, gastrointestinal upset, rash, hives, yeast infections, and in rare cases, hepatitis, kidney disease, seizures, fever, confusion, neurologic symptoms, and others. Patients with severe allergies to penicillin medications are cautioned that there is about a 10% incidence of cross-reactivity with cephalosporins. When possible, patients with penicillin allergies should use alternatives to cephalosporins for antibiotic therapy.

## 2025-06-04 NOTE — ED PROVIDER NOTE - ATTENDING CONTRIBUTION TO CARE
04-Jun-2025 13:50 I performed the initial face to face bedside interview with this patient regarding history of present illness, review of symptoms and past medical, social and family history.  I completed an independent physical examination.  I was the initial provider who evaluated this patient.  The history, review of symptoms and examination was documented by the PA in my presence and I attest to the accuracy of the documentation.  I have signed out the follow up of any pending tests (i.e. labs, radiological studies) to the PA.  I have discussed the patient’s plan of care and disposition with the PA.